# Patient Record
Sex: MALE | Race: BLACK OR AFRICAN AMERICAN | NOT HISPANIC OR LATINO | Employment: OTHER | URBAN - METROPOLITAN AREA
[De-identification: names, ages, dates, MRNs, and addresses within clinical notes are randomized per-mention and may not be internally consistent; named-entity substitution may affect disease eponyms.]

---

## 2017-01-23 ENCOUNTER — LAB VISIT (OUTPATIENT)
Dept: LAB | Facility: HOSPITAL | Age: 67
End: 2017-01-23
Attending: FAMILY MEDICINE
Payer: MEDICARE

## 2017-01-23 DIAGNOSIS — Z00.00 WELL ADULT EXAM: ICD-10-CM

## 2017-01-23 DIAGNOSIS — R73.03 BORDERLINE DIABETES: ICD-10-CM

## 2017-01-23 LAB
BASOPHILS # BLD AUTO: 0.02 K/UL
BASOPHILS NFR BLD: 0.3 %
COMPLEXED PSA SERPL-MCNC: 0.54 NG/ML
DIFFERENTIAL METHOD: ABNORMAL
EOSINOPHIL # BLD AUTO: 0.2 K/UL
EOSINOPHIL NFR BLD: 2.6 %
ERYTHROCYTE [DISTWIDTH] IN BLOOD BY AUTOMATED COUNT: 14.6 %
HCT VFR BLD AUTO: 40.1 %
HGB BLD-MCNC: 13.2 G/DL
LYMPHOCYTES # BLD AUTO: 1.7 K/UL
LYMPHOCYTES NFR BLD: 27.1 %
MCH RBC QN AUTO: 28.3 PG
MCHC RBC AUTO-ENTMCNC: 32.9 %
MCV RBC AUTO: 86 FL
MONOCYTES # BLD AUTO: 0.7 K/UL
MONOCYTES NFR BLD: 11.5 %
NEUTROPHILS # BLD AUTO: 3.5 K/UL
NEUTROPHILS NFR BLD: 58.2 %
PLATELET # BLD AUTO: 242 K/UL
PMV BLD AUTO: 10.4 FL
RBC # BLD AUTO: 4.67 M/UL
WBC # BLD AUTO: 6.08 K/UL

## 2017-01-23 PROCEDURE — 80053 COMPREHEN METABOLIC PANEL: CPT

## 2017-01-23 PROCEDURE — 83036 HEMOGLOBIN GLYCOSYLATED A1C: CPT

## 2017-01-23 PROCEDURE — 36415 COLL VENOUS BLD VENIPUNCTURE: CPT | Mod: PO

## 2017-01-23 PROCEDURE — 85025 COMPLETE CBC W/AUTO DIFF WBC: CPT

## 2017-01-23 PROCEDURE — 80061 LIPID PANEL: CPT

## 2017-01-23 PROCEDURE — 84153 ASSAY OF PSA TOTAL: CPT

## 2017-01-24 LAB
ALBUMIN SERPL BCP-MCNC: 3.8 G/DL
ALP SERPL-CCNC: 77 U/L
ALT SERPL W/O P-5'-P-CCNC: 35 U/L
ANION GAP SERPL CALC-SCNC: 9 MMOL/L
AST SERPL-CCNC: 31 U/L
BILIRUB SERPL-MCNC: 0.7 MG/DL
BUN SERPL-MCNC: 13 MG/DL
CALCIUM SERPL-MCNC: 9.3 MG/DL
CHLORIDE SERPL-SCNC: 101 MMOL/L
CHOLEST/HDLC SERPL: 2.2 {RATIO}
CO2 SERPL-SCNC: 26 MMOL/L
CREAT SERPL-MCNC: 1.4 MG/DL
EST. GFR  (AFRICAN AMERICAN): >60 ML/MIN/1.73 M^2
EST. GFR  (NON AFRICAN AMERICAN): 52.4 ML/MIN/1.73 M^2
ESTIMATED AVG GLUCOSE: 137 MG/DL
GLUCOSE SERPL-MCNC: 118 MG/DL
HBA1C MFR BLD HPLC: 6.4 %
HDL/CHOLESTEROL RATIO: 45.1 %
HDLC SERPL-MCNC: 113 MG/DL
HDLC SERPL-MCNC: 51 MG/DL
LDLC SERPL CALC-MCNC: 46.2 MG/DL
NONHDLC SERPL-MCNC: 62 MG/DL
POTASSIUM SERPL-SCNC: 4.7 MMOL/L
PROT SERPL-MCNC: 8.2 G/DL
SODIUM SERPL-SCNC: 136 MMOL/L
TRIGL SERPL-MCNC: 79 MG/DL

## 2017-01-27 ENCOUNTER — OFFICE VISIT (OUTPATIENT)
Dept: FAMILY MEDICINE | Facility: CLINIC | Age: 67
End: 2017-01-27
Payer: MEDICARE

## 2017-01-27 VITALS
DIASTOLIC BLOOD PRESSURE: 66 MMHG | TEMPERATURE: 98 F | HEART RATE: 86 BPM | WEIGHT: 315 LBS | RESPIRATION RATE: 18 BRPM | OXYGEN SATURATION: 97 % | HEIGHT: 70 IN | SYSTOLIC BLOOD PRESSURE: 110 MMHG | BODY MASS INDEX: 45.1 KG/M2

## 2017-01-27 DIAGNOSIS — G47.33 OBSTRUCTIVE SLEEP APNEA ON CPAP: ICD-10-CM

## 2017-01-27 DIAGNOSIS — E66.01 MORBID OBESITY WITH BMI OF 50.0-59.9, ADULT: ICD-10-CM

## 2017-01-27 DIAGNOSIS — R73.03 BORDERLINE DIABETES: ICD-10-CM

## 2017-01-27 DIAGNOSIS — Z00.00 WELL ADULT EXAM: Primary | ICD-10-CM

## 2017-01-27 DIAGNOSIS — I10 ESSENTIAL HYPERTENSION: ICD-10-CM

## 2017-01-27 PROCEDURE — 3078F DIAST BP <80 MM HG: CPT | Mod: S$GLB,,, | Performed by: FAMILY MEDICINE

## 2017-01-27 PROCEDURE — 99397 PER PM REEVAL EST PAT 65+ YR: CPT | Mod: S$GLB,,, | Performed by: FAMILY MEDICINE

## 2017-01-27 PROCEDURE — 99999 PR PBB SHADOW E&M-EST. PATIENT-LVL III: CPT | Mod: PBBFAC,,, | Performed by: FAMILY MEDICINE

## 2017-01-27 PROCEDURE — 3074F SYST BP LT 130 MM HG: CPT | Mod: S$GLB,,, | Performed by: FAMILY MEDICINE

## 2017-01-27 RX ORDER — DILTIAZEM HYDROCHLORIDE 300 MG/1
300 CAPSULE, COATED, EXTENDED RELEASE ORAL DAILY
Qty: 90 CAPSULE | Refills: 0 | Status: SHIPPED | OUTPATIENT
Start: 2017-01-27 | End: 2017-07-31 | Stop reason: SDUPTHER

## 2017-01-27 NOTE — PROGRESS NOTES
No chief complaint on file.      jB Pate Jr. is a 67 y.o. male who presents per the Chief Complaint.  Pt is known to me and was last seen by me on 12/9/2016.  All known chronic medical issues have been documented.       Hypertension   This is a chronic problem. The current episode started more than 1 year ago. The problem has been gradually worsening since onset. The problem is uncontrolled. Pertinent negatives include no anxiety, blurred vision, chest pain, headaches, malaise/fatigue, neck pain, orthopnea, palpitations, peripheral edema, PND, shortness of breath or sweats. Agents associated with hypertension include amphetamines. Risk factors for coronary artery disease include obesity, male gender, dyslipidemia and sedentary lifestyle. Past treatments include ACE inhibitors, calcium channel blockers and diuretics. The current treatment provides moderate improvement. Compliance problems include exercise.  There is no history of angina, kidney disease (improved), CAD/MI, CVA, heart failure, left ventricular hypertrophy, PVD, renovascular disease, retinopathy or a thyroid problem. Identifiable causes of hypertension include sleep apnea. There is no history of chronic renal disease (improved), coarctation of the aorta, hyperaldosteronism, hypercortisolism, hyperparathyroidism, a hypertension causing med or pheochromocytoma.   Diabetes   He presents for his follow-up diabetic visit. He has type 2 diabetes mellitus. His disease course has been stable. There are no hypoglycemic associated symptoms. Pertinent negatives for hypoglycemia include no confusion, dizziness (resolving), headaches, nervousness/anxiousness, pallor, seizures, sleepiness, speech difficulty (Improved), sweats or tremors. There are no diabetic associated symptoms. Pertinent negatives for diabetes include no blurred vision, no chest pain, no fatigue, no foot paresthesias and no weakness. There are no hypoglycemic complications. Symptoms are  stable. There are no diabetic complications. Pertinent negatives for diabetic complications include no CVA, PVD or retinopathy. Risk factors for coronary artery disease include diabetes mellitus, male sex, obesity, hypertension and dyslipidemia. When asked about current treatments, none were reported. His weight is stable. He is following a generally healthy diet. When asked about meal planning, he reported none. He has not had a previous visit with a dietitian. He rarely participates in exercise. There is no change in his home blood glucose trend. An ACE inhibitor/angiotensin II receptor blocker is being taken. He sees a podiatrist.Eye exam is current.        ROS  Review of Systems   Constitutional: Negative for activity change, appetite change, chills, fatigue, fever and malaise/fatigue.   HENT: Negative for congestion, ear pain, postnasal drip, rhinorrhea, sinus pressure, sore throat and trouble swallowing.    Eyes: Negative for blurred vision, pain and visual disturbance.   Respiratory: Negative for cough, chest tightness and shortness of breath.    Cardiovascular: Negative for chest pain, palpitations, orthopnea and PND.   Gastrointestinal: Negative for abdominal pain, constipation, diarrhea, nausea and vomiting.   Genitourinary: Negative for difficulty urinating, dysuria, flank pain, frequency, penile pain, penile swelling, scrotal swelling, testicular pain and urgency.   Musculoskeletal: Negative.  Negative for arthralgias, back pain, joint swelling, myalgias, neck pain and neck stiffness.   Skin: Negative.  Negative for pallor.   Allergic/Immunologic: Negative for environmental allergies, food allergies and immunocompromised state.   Neurological: Negative for dizziness (resolving), tremors, seizures, speech difficulty (Improved), weakness, light-headedness and headaches.   Psychiatric/Behavioral: Negative.  Negative for confusion. The patient is not nervous/anxious.        Physical Exam  Vitals:    01/27/17  "1055   BP: 110/66   Pulse: 86   Resp: 18   Temp: 98.1 °F (36.7 °C)    Body mass index is 49.26 kg/(m^2).  Weight: (!) 153.5 kg (338 lb 6.5 oz)   Height: 5' 9.5" (176.5 cm)     Physical Exam   Constitutional: He is oriented to person, place, and time. He appears well-developed and well-nourished. He is active and cooperative.  Non-toxic appearance. He does not have a sickly appearance. He does not appear ill. No distress.   HENT:   Head: Normocephalic and atraumatic.   Right Ear: Hearing, tympanic membrane, external ear and ear canal normal. No tenderness. No foreign bodies. No mastoid tenderness. Tympanic membrane is not injected, not scarred, not perforated, not erythematous, not retracted and not bulging. No hemotympanum. No decreased hearing is noted.   Left Ear: Hearing, tympanic membrane, external ear and ear canal normal. No tenderness. No foreign bodies. No mastoid tenderness. Tympanic membrane is not injected, not scarred, not perforated, not erythematous, not retracted and not bulging. No hemotympanum. No decreased hearing is noted.   Nose: Nose normal. No sinus tenderness, nasal deformity or septal deviation. No epistaxis.  No foreign bodies.   Mouth/Throat: Uvula is midline, oropharynx is clear and moist and mucous membranes are normal. He does not have dentures. Normal dentition. No uvula swelling or dental caries. No oropharyngeal exudate, posterior oropharyngeal edema, posterior oropharyngeal erythema or tonsillar abscesses.   Eyes: Conjunctivae, EOM and lids are normal. Pupils are equal, round, and reactive to light. Right eye exhibits no chemosis, no discharge, no exudate and no hordeolum. No foreign body present in the right eye. Left eye exhibits no chemosis, no discharge, no exudate and no hordeolum. No foreign body present in the left eye. No scleral icterus.   Neck: Normal range of motion and full passive range of motion without pain. Neck supple. No thyroid mass and no thyromegaly present. "   Cardiovascular: Normal rate, regular rhythm, S1 normal, S2 normal and normal heart sounds.  Exam reveals no gallop and no friction rub.    No murmur heard.  Pulmonary/Chest: Effort normal and breath sounds normal. He has no decreased breath sounds. He has no wheezes. He has no rhonchi. He has no rales.   Abdominal: Soft. Normal appearance and bowel sounds are normal. He exhibits no distension, no ascites and no mass. There is no hepatosplenomegaly. There is no tenderness. There is no rigidity, no rebound and no guarding. No hernia.   Musculoskeletal: Normal range of motion.   Lymphadenopathy:        Head (right side): No submental, no submandibular, no preauricular and no posterior auricular adenopathy present.        Head (left side): No submental, no submandibular, no preauricular and no posterior auricular adenopathy present.     He has no cervical adenopathy.   Neurological: He is alert and oriented to person, place, and time. He has normal strength. He displays no atrophy and no tremor. No cranial nerve deficit or sensory deficit. He exhibits normal muscle tone. He displays no seizure activity.   Skin: Skin is warm, dry and intact. No rash noted. He is not diaphoretic. No pallor.   Psychiatric: He has a normal mood and affect. His speech is normal and behavior is normal. Judgment and thought content normal. Cognition and memory are normal. He is attentive.   Vitals reviewed.      Assessment & Plan    1. Well adult exam  Discussed age appropriate screenings at this visit and encouraged a healthy diet with low saturated fats, and increased physical activity.  Screening test reviewed and discussed with patient.       2. Essential hypertension  Patient was counseled and encouraged to maintain a low sodium diet, as well as increasing physical activity.  Recommend random BP checks at home on a regular basis.  Will continue medication at this time, and follow up as recommended, or sooner if blood pressure is not  well controlled.  Discussed recent low BP and will consider medication decrease if persistent.  He is retired and has much less stress at this time and lower dose may be effective.  - diltiaZEM (CARTIA XT) 300 MG 24 hr capsule; Take 1 capsule (300 mg total) by mouth once daily.  Dispense: 90 capsule; Refill: 0    3. Borderline diabetes  Not a diabetic; discussed continued diet modifications and increasing physical activity.    4. Morbid obesity with BMI of 50.0-59.9, adult  We discussed weight and safe, effective ways of losing pounds, including low carbohydrate, low fat diet and increasing physical activity to improve cardiovascular performance and increase metabolism.  Patient was encouraged to set realistic attainable goals for weight loss, and we will follow up periodically.     5. Obstructive sleep apnea on CPAP: setting = 8  Stable; no therapeutic changes at this time.  Continue CPAP usage daily.      Follow up documented    ACTIVE MEDICAL ISSUES:  Documented in Problem List    PAST MEDICAL HISTORY  Documented    PAST SURGICAL HISTORY:  Documented    SOCIAL HISTORY:  Documented    FAMILY HISTORY:  Documented    ALLERGIES AND MEDICATIONS: updated and reviewed.  Documented    Health Maintenance       Date Due Completion Date    Zoster Vaccine 1/21/2012 ---    Colonoscopy 2/21/2015 2/21/2005 (Done)    Override on 2/21/2005: Done    Foot Exam 7/20/2016 7/20/2015 (Done)    Override on 7/20/2015: Done (today in OV)    Pneumococcal (65+) (2 of 2 - PPSV23) 2/19/2017 2/19/2016    Eye Exam 4/8/2017 4/8/2016 (Done)    Override on 4/8/2016: Done (future)    Override on 8/22/2014: Done    Hemoglobin A1c 7/23/2017 1/23/2017    Override on 4/8/2016: Done (future)    Override on 1/4/2016: Done (future)    Override on 8/22/2014: Done    Lipid Panel 1/23/2018 1/23/2017    Override on 8/22/2014: Done    TETANUS VACCINE 2/19/2026 2/19/2016

## 2017-03-07 ENCOUNTER — OFFICE VISIT (OUTPATIENT)
Dept: FAMILY MEDICINE | Facility: CLINIC | Age: 67
End: 2017-03-07
Payer: MEDICARE

## 2017-03-07 VITALS
TEMPERATURE: 99 F | HEIGHT: 69 IN | BODY MASS INDEX: 46.65 KG/M2 | WEIGHT: 315 LBS | SYSTOLIC BLOOD PRESSURE: 117 MMHG | OXYGEN SATURATION: 97 % | HEART RATE: 69 BPM | DIASTOLIC BLOOD PRESSURE: 70 MMHG | RESPIRATION RATE: 12 BRPM

## 2017-03-07 DIAGNOSIS — R06.09 DOE (DYSPNEA ON EXERTION): ICD-10-CM

## 2017-03-07 DIAGNOSIS — E66.01 MORBID OBESITY WITH BMI OF 45.0-49.9, ADULT: ICD-10-CM

## 2017-03-07 DIAGNOSIS — N18.2 TYPE 2 DIABETES MELLITUS WITH STAGE 2 CHRONIC KIDNEY DISEASE, WITHOUT LONG-TERM CURRENT USE OF INSULIN: ICD-10-CM

## 2017-03-07 DIAGNOSIS — R05.3 CHRONIC COUGH: ICD-10-CM

## 2017-03-07 DIAGNOSIS — E11.22 TYPE 2 DIABETES MELLITUS WITH STAGE 2 CHRONIC KIDNEY DISEASE, WITHOUT LONG-TERM CURRENT USE OF INSULIN: ICD-10-CM

## 2017-03-07 DIAGNOSIS — Z13.5 SCREENING FOR GLAUCOMA: ICD-10-CM

## 2017-03-07 DIAGNOSIS — Z23 NEED FOR VACCINATION AGAINST STREPTOCOCCUS PNEUMONIAE: ICD-10-CM

## 2017-03-07 DIAGNOSIS — Z23 NEED FOR PNEUMOCOCCAL VACCINATION: ICD-10-CM

## 2017-03-07 DIAGNOSIS — E11.9 DIET-CONTROLLED DIABETES MELLITUS: ICD-10-CM

## 2017-03-07 DIAGNOSIS — I10 ESSENTIAL HYPERTENSION: ICD-10-CM

## 2017-03-07 DIAGNOSIS — Z12.11 SCREENING FOR COLON CANCER: ICD-10-CM

## 2017-03-07 DIAGNOSIS — Z12.11 COLON CANCER SCREENING: ICD-10-CM

## 2017-03-07 DIAGNOSIS — Z00.00 ENCOUNTER FOR PREVENTIVE HEALTH EXAMINATION: Primary | ICD-10-CM

## 2017-03-07 PROCEDURE — 90732 PPSV23 VACC 2 YRS+ SUBQ/IM: CPT | Mod: S$GLB,,, | Performed by: PHYSICIAN ASSISTANT

## 2017-03-07 PROCEDURE — 3078F DIAST BP <80 MM HG: CPT | Mod: S$GLB,,, | Performed by: PHYSICIAN ASSISTANT

## 2017-03-07 PROCEDURE — 99499 UNLISTED E&M SERVICE: CPT | Mod: S$GLB,,, | Performed by: PHYSICIAN ASSISTANT

## 2017-03-07 PROCEDURE — 99999 PR PBB SHADOW E&M-EST. PATIENT-LVL V: CPT | Mod: PBBFAC,,, | Performed by: PHYSICIAN ASSISTANT

## 2017-03-07 PROCEDURE — 3074F SYST BP LT 130 MM HG: CPT | Mod: S$GLB,,, | Performed by: PHYSICIAN ASSISTANT

## 2017-03-07 PROCEDURE — G0439 PPPS, SUBSEQ VISIT: HCPCS | Mod: S$GLB,,, | Performed by: PHYSICIAN ASSISTANT

## 2017-03-07 PROCEDURE — G0009 ADMIN PNEUMOCOCCAL VACCINE: HCPCS | Mod: S$GLB,,, | Performed by: PHYSICIAN ASSISTANT

## 2017-03-07 RX ORDER — DICLOFENAC SODIUM 10 MG/G
2 GEL TOPICAL DAILY
COMMUNITY
End: 2017-05-29 | Stop reason: SDUPTHER

## 2017-03-07 NOTE — PATIENT INSTRUCTIONS
Counseling and Referral of Other Preventative  (Italic type indicates deductible and co-insurance are waived)    Patient Name: Bj Pate  Today's Date: 3/7/2017      SERVICE LIMITATIONS RECOMMENDATION    Vaccines    · Pneumococcal (once after 65)    · Influenza (annually)    · Hepatitis B (if medium/high risk)    · Prevnar 13      Hepatitis B medium/high risk factors:       - End-stage renal disease       - Hemophiliacs who received Factor VII or         IX concentrates       - Clients of institutions for the mentally             retarded       - Persons who live in the same house as          a HepB carrier       - Homosexual men       - Illicit injectable drug abusers     Pneumococcal: Scheduled - see appointments     Influenza: Done, repeat in one year     Hepatitis B: N/A NA     Prevnar 13: Done, repeat at next scheduled date    Prostate cancer screening (annually to age 75)     Prostate specific antigen (PSA) Shared decision making with Provider. Sometimes a co-pay may be required if the patient decides to have this test. The USPSTF no longer recommends prostate cancer screening routinely in medicine: every 1 year    Colorectal cancer screening (to age 75)    · Fecal occult blood test (annual)  · Flexible sigmoidoscopy (5y)  · Screening colonoscopy (10y)  · Barium enema   Recommended to patient, declined    Diabetes self-management training (no USPSTF recommendations)  Requires referral by treating physician for patient with diabetes or renal disease. 10 hours of initial DSMT sessions of no less than 30 minutes each in a continuous 12-month period. 2 hours of follow-up DSMT in subsequent years.  N/A NA    Glaucoma screening (no USPSTF recommendation)  Diabetes mellitus, family history   , age 50 or over    American, age 65 or over  Scheduled, see appointments    Medical nutrition therapy for diabetes or renal disease (no recommended schedule)  Requires referral by treating physician  for patient with diabetes or renal disease or kidney transplant within the past 3 years.  Can be provided in same year as diabetes self-management training (DSMT), and CMS recommends medical nutrition therapy take place after DSMT. Up to 3 hours for initial year and 2 hours in subsequent years.  N/A NA    Cardiovascular screening blood tests (every 5 years)  · Fasting lipid panel  Order as a panel if possible  Done this year, repeat every year    Diabetes screening tests (at least every 3 years, Medicare covers annually or at 6-month intervals for prediabetic patients)  · Fasting blood sugar (FBS) or glucose tolerance test (GTT)  Patient must be diagnosed with one of the following:       - Hypertension       - Dyslipidemia       - Obesity (BMI 30kg/m2)       - Previous elevated impaired FBS or GTT       ... or any two of the following:       - Overweight (BMI 25 but <30)       - Family history of diabetes       - Age 65 or older       - History of gestational diabetes or birth of baby weighing more than 9 pounds  Done this year, repeat every year    Abdominal aortic aneurysm screening (once)  · Sonogram   Limited to patients who meet one of the following criteria:       - Men who are 65-75 years old and have smoked more than 100 cigarette in their lifetime       - Anyone with a family history of abdominal aortic aneurysm       - Anyone recommended for screening by the USPSTF  N/A NA    HIV screening (annually for increased risk patients)  · HIV-1 and HIV-2 by EIA, or JOCELYN, rapid antibody test or oral mucosa transudate  Patients must be at increased risk for HIV infection per USPSTF guidelines or pregnant. Tests covered annually for patient at increased risk or as requested by the patient. Pregnant patients may receive up to 3 tests during pregnancy.  Risks discussed, screening is not recommended    Smoking cessation counseling (up to 8 sessions per year)  Patients must be asymptomatic of tobacco-related conditions  to receive as a preventative service.  not smoker    Subsequent annual wellness visit  At least 12 months since last AWV  Return in one year     The following information is provided to all patients.  This information is to help you find resources for any of the problems found today that may be affecting your health:                Living healthy guide: www.[facility].[place].[facility]      Understanding Diabetes: www.diabetes.org      Eating healthy: www.cdc.gov/healthyweight      CDC home safety checklist: www.cdc.gov/steadi/patient.html      Agency on Aging: www.goea.[email or web address]      Alcoholics anonymous (AA): www.aa.org      Physical Activity: www.tom.nih.gov/lv7uwap      Tobacco use: www.quitwithusla.org

## 2017-03-07 NOTE — MR AVS SNAPSHOT
[facility]  [address]  [place] [address]  Phone: [phone]  Fax: [phone]                  Bj Ptae Jr.   3/7/2017 1:00 PM   Office Visit    Description:  Male : 1950   Provider:  Jaida Juarez PA-C   Department:  [facility]           Reason for Visit     Health Risk Assessment           Diagnoses this Visit        Comments    Essential hypertension    -  Primary     Colon cancer screening         Need for pneumococcal vaccination         Encounter for preventive health examination         Screening for glaucoma         Need for vaccination against Streptococcus pneumoniae         Screening for colon cancer         Diet-controlled diabetes mellitus         LAUREANO (dyspnea on exertion)         Chronic cough                To Do List           Goals (5 Years of Data)              17    HEMOGLOBIN A1C < 7.0   6.4  6.4  6.5    Related Problems    Type 2 DM with CKD stage 3 and hypertension      [facility]     [facility] -  Assistance  Registered nurses in the [facility] Call Center provide clinical advisement, health education, appointment booking, and other advisory services.  Call for this free service at [phone].             Medications           Message regarding Medications     Verify the changes and/or additions to your medication regime listed below are the same as discussed with your clinician today.  If any of these changes or additions are incorrect, please notify your healthcare provider.             Verify that the below list of medications is an accurate representation of the medications you are currently taking.  If none reported, the list may be blank. If incorrect, please contact your healthcare provider. Carry this list with you in case of emergency.           Current Medications     albuterol 90 mcg/actuation inhaler Inhale 2 puffs into the lungs every 6 (six) hours as needed  "for Wheezing.    aspirin 325 MG tablet Take 1 tablet (325 mg total) by mouth once daily.    atorvastatin (LIPITOR) 40 MG tablet Take 1 tablet (40 mg total) by mouth once daily.    COLCRYS 0.6 mg tablet TAKE 2 TABLET BY MOUTH AT THE FIRST SIGN OF GOUT FLARE THEN ONE TABLET ONE HOUR LATER    diclofenac sodium 1 % Gel Apply 2 g topically once daily.    diltiaZEM (CARTIA XT) 300 MG 24 hr capsule Take 1 capsule (300 mg total) by mouth once daily.    levocetirizine (XYZAL) 5 MG tablet Take 1 tablet (5 mg total) by mouth every evening.    lisinopril-hydrochlorothiazide (PRINZIDE,ZESTORETIC) 20-25 mg Tab Take 1 tablet by mouth once daily.    clobetasol 0.05% (TEMOVATE) 0.05 % Oint Apply topically 2 (two) times daily.           Clinical Reference Information           Your Vitals Were     BP Pulse Temp Resp Height Weight    117/70 (BP Location: Left arm, Patient Position: Sitting, BP Method: Manual) 69 98.8 °F (37.1 °C) (Oral) 12 5' 9" (1.753 m) 151.7 kg (334 lb 7 oz)    SpO2 BMI             97% 49.39 kg/m2         Blood Pressure          Most Recent Value    BP  117/70      Allergies as of 3/7/2017     Tomato (Solanum Lycopersicum)    Naproxen    Shrimp      Immunizations Administered on Date of Encounter - 3/7/2017     Name Date Dose VIS Date Route    Pneumococcal Polysaccharide - 23 Valent  Incomplete 0.5 mL 4/24/2015 Intramuscular      Orders Placed During Today's Visit      Normal Orders This Visit    Ambulatory referral to Optometry     Pneumococcal polysaccharide vaccine 23-valent greater than or equal to 3yo subcutaneous/IM     POCT occult blood stool     Future Labs/Procedures Expected by Expires    Complete PFT w/o bronchodilator  As directed 3/7/2018      Instructions      Counseling and Referral of Other Preventative  (Italic type indicates deductible and co-insurance are waived)    Patient Name: Bj Pate  Today's Date: 3/7/2017      SERVICE LIMITATIONS RECOMMENDATION    Vaccines    · Pneumococcal (once " after 65)    · Influenza (annually)    · Hepatitis B (if medium/high risk)    · Prevnar 13      Hepatitis B medium/high risk factors:       - End-stage renal disease       - Hemophiliacs who received Factor VII or         IX concentrates       - Clients of institutions for the mentally             retarded       - Persons who live in the same house as          a HepB carrier       - Homosexual men       - Illicit injectable drug abusers     Pneumococcal: Scheduled - see appointments     Influenza: Done, repeat in one year     Hepatitis B: N/A NA     Prevnar 13: Done, repeat at next scheduled date    Prostate cancer screening (annually to age 75)     Prostate specific antigen (PSA) Shared decision making with Provider. Sometimes a co-pay may be required if the patient decides to have this test. The USPSTF no longer recommends prostate cancer screening routinely in medicine: every 1 year    Colorectal cancer screening (to age 75)    · Fecal occult blood test (annual)  · Flexible sigmoidoscopy (5y)  · Screening colonoscopy (10y)  · Barium enema   Recommended to patient, declined    Diabetes self-management training (no USPSTF recommendations)  Requires referral by treating physician for patient with diabetes or renal disease. 10 hours of initial DSMT sessions of no less than 30 minutes each in a continuous 12-month period. 2 hours of follow-up DSMT in subsequent years.  N/A NA    Glaucoma screening (no USPSTF recommendation)  Diabetes mellitus, family history   , age 50 or over    American, age 65 or over  Scheduled, see appointments    Medical nutrition therapy for diabetes or renal disease (no recommended schedule)  Requires referral by treating physician for patient with diabetes or renal disease or kidney transplant within the past 3 years.  Can be provided in same year as diabetes self-management training (DSMT), and CMS recommends medical nutrition therapy take place after DSMT. Up to 3  hours for initial year and 2 hours in subsequent years.  N/A NA    Cardiovascular screening blood tests (every 5 years)  · Fasting lipid panel  Order as a panel if possible  Done this year, repeat every year    Diabetes screening tests (at least every 3 years, Medicare covers annually or at 6-month intervals for prediabetic patients)  · Fasting blood sugar (FBS) or glucose tolerance test (GTT)  Patient must be diagnosed with one of the following:       - Hypertension       - Dyslipidemia       - Obesity (BMI 30kg/m2)       - Previous elevated impaired FBS or GTT       ... or any two of the following:       - Overweight (BMI 25 but <30)       - Family history of diabetes       - Age 65 or older       - History of gestational diabetes or birth of baby weighing more than 9 pounds  Done this year, repeat every year    Abdominal aortic aneurysm screening (once)  · Sonogram   Limited to patients who meet one of the following criteria:       - Men who are 65-75 years old and have smoked more than 100 cigarette in their lifetime       - Anyone with a family history of abdominal aortic aneurysm       - Anyone recommended for screening by the USPSTF  N/A NA    HIV screening (annually for increased risk patients)  · HIV-1 and HIV-2 by EIA, or JOCELYN, rapid antibody test or oral mucosa transudate  Patients must be at increased risk for HIV infection per USPSTF guidelines or pregnant. Tests covered annually for patient at increased risk or as requested by the patient. Pregnant patients may receive up to 3 tests during pregnancy.  Risks discussed, screening is not recommended    Smoking cessation counseling (up to 8 sessions per year)  Patients must be asymptomatic of tobacco-related conditions to receive as a preventative service.  not smoker    Subsequent annual wellness visit  At least 12 months since last AWV  Return in one year     The following information is provided to all patients.  This information is to help you find  resources for any of the problems found today that may be affecting your health:                Living healthy guide: www.[facility].[place].[facility]      Understanding Diabetes: www.diabetes.org      Eating healthy: www.cdc.gov/healthyweight      CDC home safety checklist: www.cdc.gov/steadi/patient.html      Agency on Aging: www.goea.[place].[facility]      Alcoholics anonymous (AA): www.aa.org      Physical Activity: www.tom.nih.gov/co9hzey      Tobacco use: www.quitwithusla.org          Language Assistance Services     ATTENTION: Language assistance services are available, free of charge. Please call [phone].      ATENCIÓN: Si habla español, tiene a fletcher disposición servicios gratuitos de asistencia lingüística. Llame al [phone].     CHÚ Ý: N?u b?n nói Ti?ng Vi?t, có các d?ch v? h? tr? ngôn ng? mi?n phí dành cho b?n. G?i s? [phone].         [facility] complies with applicable Federal civil rights laws and does not discriminate on the basis of race, color, national origin, age, disability, or sex.

## 2017-03-08 NOTE — PROGRESS NOTES
"Bj Pate presented for a  Medicare AWV and comprehensive Health Risk Assessment today. The following components were reviewed and updated:    · Medical history  · Family History  · Social history  · Allergies and Current Medications  · Health Risk Assessment  · Health Maintenance  · Care Team     ** See Completed Assessments for Annual Wellness Visit within the encounter summary.**       The following assessments were completed:  · Living Situation  · CAGE  · Depression Screening  · Timed Get Up and Go  · Whisper Test  · Cognitive Function Screening  · Nutrition Screening  · ADL Screening  · PAQ Screening    Vitals:    03/07/17 1258   BP: 117/70   BP Location: Left arm   Patient Position: Sitting   BP Method: Manual   Pulse: 69   Resp: 12   Temp: 98.8 °F (37.1 °C)   TempSrc: Oral   SpO2: 97%   Weight: (!) 151.7 kg (334 lb 7 oz)   Height: 5' 9" (1.753 m)     Body mass index is 49.39 kg/(m^2).  Physical Exam   Constitutional: He is oriented to person, place, and time. No distress.   obese   Cardiovascular: Normal rate and regular rhythm.    Pulses:       Dorsalis pedis pulses are 2+ on the right side, and 2+ on the left side.   Pulmonary/Chest: Effort normal and breath sounds normal. No respiratory distress. He has no wheezes.   Feet:   Right Foot:   Protective Sensation: 10 sites tested. 8 sites sensed.   Skin Integrity: Positive for dry skin.   Left Foot:   Protective Sensation: 10 sites tested. 8 sites sensed.   Skin Integrity: Positive for dry skin.   Neurological: He is alert and oriented to person, place, and time.   Skin: He is not diaphoretic.   Vitals reviewed.        Diagnoses and health risks identified today and associated recommendations/orders:    1. Encounter for preventive health examination  Provided Bj with a 5-10 year written screening schedule and personal prevention plan. Recommendations were developed using the USPSTF age appropriate recommendations. Education, counseling, and referrals " were provided as needed. After Visit Summary printed and given to patient which includes a list of additional screenings\tests needed.    2. Morbid obesity with BMI of 45.0-49.9, adult  Discussed diet and exercise. 4 lb decrease in 1 month    3. Diet-controlled diabetes mellitus  Continue low carb low sugar diet    4. Type 2 diabetes mellitus with stage 2 chronic kidney disease, without long-term current use of insulin  Stable, continue diabetic diet    5. Colon cancer screening  FOBT stool cards    7. Screening for glaucoma  - Ambulatory referral to Optometry    8. Need for vaccination against Streptococcus pneumoniae  - Pneumococcal polysaccharide vaccine 23-valent greater than or equal to 3yo subcutaneous/IM    9. Screening for colon cancer  - POCT occult blood stool    10. Essential hypertension  Stable on current meds    11. LAUREANO (dyspnea on exertion)  - Complete PFT w/o bronchodilator; Future    12. Chronic cough  - Complete PFT w/o bronchodilator; Future      No Follow-up on file.    Jaida Juarez PA-C

## 2017-03-09 ENCOUNTER — HOSPITAL ENCOUNTER (OUTPATIENT)
Dept: RESPIRATORY THERAPY | Facility: HOSPITAL | Age: 67
Discharge: HOME | End: 2017-03-09
Attending: FAMILY MEDICINE
Payer: MEDICARE

## 2017-03-09 DIAGNOSIS — R06.09 DOE (DYSPNEA ON EXERTION): ICD-10-CM

## 2017-03-09 DIAGNOSIS — R05.3 CHRONIC COUGH: ICD-10-CM

## 2017-03-09 PROCEDURE — 94010 BREATHING CAPACITY TEST: CPT

## 2017-03-09 PROCEDURE — 94727 GAS DIL/WSHOT DETER LNG VOL: CPT

## 2017-03-09 PROCEDURE — 94729 DIFFUSING CAPACITY: CPT

## 2017-03-10 ENCOUNTER — TELEPHONE (OUTPATIENT)
Dept: FAMILY MEDICINE | Facility: CLINIC | Age: 67
End: 2017-03-10

## 2017-03-14 ENCOUNTER — OFFICE VISIT (OUTPATIENT)
Dept: OPTOMETRY | Facility: CLINIC | Age: 67
End: 2017-03-14
Payer: MEDICARE

## 2017-03-14 DIAGNOSIS — H25.13 NUCLEAR SCLEROSIS, BILATERAL: ICD-10-CM

## 2017-03-14 DIAGNOSIS — E11.9 TYPE 2 DIABETES MELLITUS WITHOUT OPHTHALMIC MANIFESTATIONS: Primary | ICD-10-CM

## 2017-03-14 DIAGNOSIS — H52.7 REFRACTIVE ERROR: ICD-10-CM

## 2017-03-14 DIAGNOSIS — Z13.5 SCREENING FOR GLAUCOMA: ICD-10-CM

## 2017-03-14 DIAGNOSIS — H35.413 LATTICE DEGENERATION, BILATERAL: ICD-10-CM

## 2017-03-14 PROCEDURE — 99999 PR PBB SHADOW E&M-EST. PATIENT-LVL II: CPT | Mod: PBBFAC,,, | Performed by: OPTOMETRIST

## 2017-03-14 PROCEDURE — 92014 COMPRE OPH EXAM EST PT 1/>: CPT | Mod: S$GLB,,, | Performed by: OPTOMETRIST

## 2017-03-14 PROCEDURE — 99499 UNLISTED E&M SERVICE: CPT | Mod: S$GLB,,, | Performed by: OPTOMETRIST

## 2017-03-14 NOTE — LETTER
March 14, 2017      Daija Sheppard MD  [address]  [place] [address]           [facility]  [address] [facility] [address]  [place] [address]  Phone: [phone]  Fax: [phone]          Patient: Bj Pate Jr.   MR Number: [id number]   YOB: 1950   Date of Visit: 3/14/2017       Dear Dr. Daija Sheppard:    Thank you for referring Bj Pate to me for evaluation. Attached you will find relevant portions of my assessment and plan of care.    If you have questions, please do not hesitate to call me. I look forward to following Bj Pate along with you.    Sincerely,    Geno Hook, OD    Enclosure  CC:  No Recipients    If you would like to receive this communication electronically, please contact [email or web address] or [phone] to request more information on [facility] Link access.    For providers and/or their staff who would like to refer a patient to [facility], please contact us through our one-stop-shop provider referral line, [facility] , at [phone].    If you feel you have received this communication in error or would no longer like to receive these types of communications, please e-mail [email or web address]

## 2017-03-14 NOTE — PROGRESS NOTES
HPI     Diabetic Eye Exam    Additional comments: diet controlled            Comments   Dls: 6/7/13 Dr. Riojas     Pt here for diabetic eye exam.   Pt states no changes in vision. Pt wears bifocal glasses. Pt states no   tearing no itching no burning no pain no ha's no floaters.     Eye meds:   otc gtts ou prn     Hemoglobin A1C       Date                     Value               Ref Range             Status                01/23/2017               6.4 (H)             4.5 - 6.2 %           Final                  01/30/2016               6.4 (H)             4.5 - 6.2 %           Final                 01/04/2016               6.5 (H)             4.5 - 6.2 %           Final            ----------  LBS - does not check, prior diabetic, now diet controlled, no meds    Family OHx  (--) glaucoma  (--) AMD         Last edited by Geno Hook, OD on 3/14/2017 10:47 AM. (History)          Assessment /Plan     For exam results, see Encounter Report.    Type 2 diabetes mellitus without ophthalmic manifestations  - No diabetic retinopathy. Educated patient on importance of good blood sugar control, compliance with meds, and follow up care with PCP. Return in 1 year for dilated eye exam, sooner PRN.    Nuclear sclerosis, bilateral  - Educated pt on presence of cataracts and effects on vision. No surgery at this time. Recheck in one year.    Lattice degeneration, bilateral  - Stable. No holes/tears/detachments. Monitor.     Screening for glaucoma  - No changes related to glaucoma. Monitor yearly.    Refractive error  - Patient declined refraction today, continue with current spectacles.     RTC 1 year(s) or sooner PRN

## 2017-03-14 NOTE — MR AVS SNAPSHOT
[facility]  [address]  [place] [address]  Phone: [phone]  Fax: [phone]                  Bj Pate Jr.   3/14/2017 10:30 AM   Office Visit    Description:  Male : 1950   Provider:  Geno Hook OD   Department:  [facility]           Reason for Visit     Concerns About Ocular Health     Diabetic Eye Exam           Diagnoses this Visit        Comments    Type 2 diabetes mellitus without ophthalmic manifestations    -  Primary     Nuclear sclerosis, bilateral         Lattice degeneration, bilateral         Screening for glaucoma         Refractive error                To Do List           Goals (5 Years of Data)              17    HEMOGLOBIN A1C < 7.0   6.4  6.4  6.5    Related Problems    Type 2 DM with CKD stage 3 and hypertension      Follow-Up and Disposition     Return in about 1 year (around 3/14/2018) for Diabetic Eye Exam.      [facility]     [facility] On Call Nurse Care Line - 24/ Assistance  Registered nurses in the [facility] On Call Center provide clinical advisement, health education, appointment booking, and other advisory services.  Call for this free service at [phone].             Medications           Message regarding Medications     Verify the changes and/or additions to your medication regime listed below are the same as discussed with your clinician today.  If any of these changes or additions are incorrect, please notify your healthcare provider.             Verify that the below list of medications is an accurate representation of the medications you are currently taking.  If none reported, the list may be blank. If incorrect, please contact your healthcare provider. Carry this list with you in case of emergency.           Current Medications     atorvastatin (LIPITOR) 40 MG tablet Take 1 tablet (40 mg total) by mouth once daily.    COLCRYS 0.6 mg tablet TAKE 2 TABLET BY MOUTH AT THE FIRST SIGN OF GOUT FLARE  THEN ONE TABLET ONE HOUR LATER    diclofenac sodium 1 % Gel Apply 2 g topically once daily.    diltiaZEM (CARTIA XT) 300 MG 24 hr capsule Take 1 capsule (300 mg total) by mouth once daily.    levocetirizine (XYZAL) 5 MG tablet Take 1 tablet (5 mg total) by mouth every evening.    lisinopril-hydrochlorothiazide (PRINZIDE,ZESTORETIC) 20-25 mg Tab Take 1 tablet by mouth once daily.    albuterol 90 mcg/actuation inhaler Inhale 2 puffs into the lungs every 6 (six) hours as needed for Wheezing.    aspirin 325 MG tablet Take 1 tablet (325 mg total) by mouth once daily.    clobetasol 0.05% (TEMOVATE) 0.05 % Oint Apply topically 2 (two) times daily.           Clinical Reference Information           Allergies as of 3/14/2017     Tomato (Solanum Lycopersicum)    Naproxen    Shrimp      Immunizations Administered on Date of Encounter - 3/14/2017     None      Language Assistance Services     ATTENTION: Language assistance services are available, free of charge. Please call [phone].      ATENCIÓN: Si darwinla jeremy, tiene a fletcher disposición servicios gratuitos de asistencia lingüística. Llame al [phone].     CHÚ Ý: N?u b?n nói Ti?ng Vi?t, có các d?ch v? h? tr? ngôn ng? mi?n phí dành cho b?n. G?i s? [phone].         [facility] complies with applicable Federal civil rights laws and does not discriminate on the basis of race, color, national origin, age, disability, or sex.

## 2017-03-16 ENCOUNTER — LAB VISIT (OUTPATIENT)
Dept: LAB | Facility: HOSPITAL | Age: 67
End: 2017-03-16
Attending: FAMILY MEDICINE
Payer: MEDICARE

## 2017-03-16 DIAGNOSIS — R53.83 FATIGUE, UNSPECIFIED TYPE: ICD-10-CM

## 2017-03-16 DIAGNOSIS — Z12.11 COLON CANCER SCREENING: Primary | ICD-10-CM

## 2017-03-16 PROCEDURE — 82272 OCCULT BLD FECES 1-3 TESTS: CPT

## 2017-03-17 LAB
OB PNL STL: NEGATIVE

## 2017-03-20 ENCOUNTER — TELEPHONE (OUTPATIENT)
Dept: FAMILY MEDICINE | Facility: CLINIC | Age: 67
End: 2017-03-20

## 2017-03-20 NOTE — TELEPHONE ENCOUNTER
----- Message from Emely Diaz sent at 3/20/2017 11:03 AM CDT -----  Contact: self  Pt called to discuss lung test results. Please advise. [phone]

## 2017-03-29 ENCOUNTER — TELEPHONE (OUTPATIENT)
Dept: FAMILY MEDICINE | Facility: CLINIC | Age: 67
End: 2017-03-29

## 2017-03-29 DIAGNOSIS — J44.9 COPD, MODERATE: ICD-10-CM

## 2017-03-29 RX ORDER — FLUTICASONE PROPIONATE AND SALMETEROL 250; 50 UG/1; UG/1
1 POWDER RESPIRATORY (INHALATION) 2 TIMES DAILY
Qty: 60 EACH | Refills: 2 | Status: SHIPPED | OUTPATIENT
Start: 2017-03-29 | End: 2017-08-30 | Stop reason: SDUPTHER

## 2017-03-29 NOTE — TELEPHONE ENCOUNTER
----- Message from Clementina Vance sent at 3/29/2017  1:03 PM CDT -----  Contact: Self  Pt requesting lung test results.  Please call [phone]

## 2017-03-29 NOTE — TELEPHONE ENCOUNTER
Patient informed of PFT results.  Patient would like to know what is next step in treatment.   Patient informed, per Dr. Lombard, that oral medication to help with symptoms will be sent to pharmacy.  Patient verbalized understanding.

## 2017-03-30 ENCOUNTER — TELEPHONE (OUTPATIENT)
Dept: FAMILY MEDICINE | Facility: CLINIC | Age: 67
End: 2017-03-30

## 2017-03-30 NOTE — TELEPHONE ENCOUNTER
----- Message from Светлана Galaviz sent at 3/30/2017 11:58 AM CDT -----  Contact: [phone]  Pt is returning the phone call Please call pt at your earliest convenience.  Thanks !

## 2017-05-29 RX ORDER — DICLOFENAC SODIUM 10 MG/G
2 GEL TOPICAL DAILY
Qty: 100 G | Refills: 2 | Status: SHIPPED | OUTPATIENT
Start: 2017-05-29 | End: 2017-11-22 | Stop reason: SDUPTHER

## 2017-06-07 DIAGNOSIS — E78.5 DYSLIPIDEMIA: ICD-10-CM

## 2017-06-07 DIAGNOSIS — N18.30 BENIGN HYPERTENSION WITH CKD (CHRONIC KIDNEY DISEASE) STAGE III: ICD-10-CM

## 2017-06-07 DIAGNOSIS — I12.9 BENIGN HYPERTENSION WITH CKD (CHRONIC KIDNEY DISEASE) STAGE III: ICD-10-CM

## 2017-06-08 RX ORDER — LISINOPRIL AND HYDROCHLOROTHIAZIDE 20; 25 MG/1; MG/1
TABLET ORAL
Qty: 30 TABLET | Refills: 6 | Status: SHIPPED | OUTPATIENT
Start: 2017-06-08 | End: 2018-01-30 | Stop reason: SDUPTHER

## 2017-06-08 RX ORDER — ATORVASTATIN CALCIUM 40 MG/1
TABLET, FILM COATED ORAL
Qty: 30 TABLET | Refills: 6 | Status: SHIPPED | OUTPATIENT
Start: 2017-06-08 | End: 2018-02-07 | Stop reason: SDUPTHER

## 2017-07-16 RX ORDER — INDOMETHACIN 50 MG/1
CAPSULE ORAL
Qty: 30 CAPSULE | Refills: 0 | OUTPATIENT
Start: 2017-07-16

## 2017-07-17 RX ORDER — COLCHICINE 0.6 MG/1
TABLET, FILM COATED ORAL
Qty: 30 TABLET | Refills: 0 | Status: SHIPPED | OUTPATIENT
Start: 2017-07-17 | End: 2017-08-24 | Stop reason: SDUPTHER

## 2017-07-20 RX ORDER — INDOMETHACIN 50 MG/1
CAPSULE ORAL
Qty: 30 CAPSULE | Refills: 0 | Status: SHIPPED | OUTPATIENT
Start: 2017-07-20 | End: 2017-12-26 | Stop reason: SDUPTHER

## 2017-07-28 DIAGNOSIS — J32.0 CHRONIC MAXILLARY SINUSITIS: ICD-10-CM

## 2017-07-28 RX ORDER — LEVOCETIRIZINE DIHYDROCHLORIDE 5 MG/1
TABLET, FILM COATED ORAL
Qty: 30 TABLET | Refills: 0 | Status: SHIPPED | OUTPATIENT
Start: 2017-07-28 | End: 2017-09-14 | Stop reason: SDUPTHER

## 2017-07-31 ENCOUNTER — OFFICE VISIT (OUTPATIENT)
Dept: FAMILY MEDICINE | Facility: CLINIC | Age: 67
End: 2017-07-31
Payer: MEDICARE

## 2017-07-31 ENCOUNTER — LAB VISIT (OUTPATIENT)
Dept: LAB | Facility: HOSPITAL | Age: 67
End: 2017-07-31
Attending: FAMILY MEDICINE
Payer: MEDICARE

## 2017-07-31 VITALS
RESPIRATION RATE: 17 BRPM | BODY MASS INDEX: 46.65 KG/M2 | TEMPERATURE: 99 F | HEART RATE: 80 BPM | HEIGHT: 69 IN | DIASTOLIC BLOOD PRESSURE: 68 MMHG | OXYGEN SATURATION: 96 % | SYSTOLIC BLOOD PRESSURE: 118 MMHG | WEIGHT: 315 LBS

## 2017-07-31 DIAGNOSIS — Z86.79 HISTORY OF CEREBRAL PARENCHYMAL HEMORRHAGE: ICD-10-CM

## 2017-07-31 DIAGNOSIS — E11.9 DIET-CONTROLLED DIABETES MELLITUS: ICD-10-CM

## 2017-07-31 DIAGNOSIS — E11.9 DIET-CONTROLLED DIABETES MELLITUS: Primary | ICD-10-CM

## 2017-07-31 DIAGNOSIS — I10 ESSENTIAL HYPERTENSION: ICD-10-CM

## 2017-07-31 LAB
ANION GAP SERPL CALC-SCNC: 9 MMOL/L
BUN SERPL-MCNC: 20 MG/DL
CALCIUM SERPL-MCNC: 9.1 MG/DL
CHLORIDE SERPL-SCNC: 102 MMOL/L
CHOLEST/HDLC SERPL: 2.5 {RATIO}
CO2 SERPL-SCNC: 26 MMOL/L
CREAT SERPL-MCNC: 1.7 MG/DL
EST. GFR  (AFRICAN AMERICAN): 47.9 ML/MIN/1.73 M^2
EST. GFR  (NON AFRICAN AMERICAN): 41.4 ML/MIN/1.73 M^2
GLUCOSE SERPL-MCNC: 99 MG/DL
HDL/CHOLESTEROL RATIO: 39.5 %
HDLC SERPL-MCNC: 119 MG/DL
HDLC SERPL-MCNC: 47 MG/DL
LDLC SERPL CALC-MCNC: 53.4 MG/DL
NONHDLC SERPL-MCNC: 72 MG/DL
POTASSIUM SERPL-SCNC: 4.2 MMOL/L
SODIUM SERPL-SCNC: 137 MMOL/L
TRIGL SERPL-MCNC: 93 MG/DL

## 2017-07-31 PROCEDURE — 80061 LIPID PANEL: CPT

## 2017-07-31 PROCEDURE — 36415 COLL VENOUS BLD VENIPUNCTURE: CPT | Mod: PO

## 2017-07-31 PROCEDURE — 99499 UNLISTED E&M SERVICE: CPT | Mod: S$GLB,,, | Performed by: FAMILY MEDICINE

## 2017-07-31 PROCEDURE — 80048 BASIC METABOLIC PNL TOTAL CA: CPT

## 2017-07-31 PROCEDURE — 3044F HG A1C LEVEL LT 7.0%: CPT | Mod: S$GLB,,, | Performed by: FAMILY MEDICINE

## 2017-07-31 PROCEDURE — 99214 OFFICE O/P EST MOD 30 MIN: CPT | Mod: S$GLB,,, | Performed by: FAMILY MEDICINE

## 2017-07-31 PROCEDURE — 4010F ACE/ARB THERAPY RXD/TAKEN: CPT | Mod: S$GLB,,, | Performed by: FAMILY MEDICINE

## 2017-07-31 PROCEDURE — 83036 HEMOGLOBIN GLYCOSYLATED A1C: CPT

## 2017-07-31 PROCEDURE — 99999 PR PBB SHADOW E&M-EST. PATIENT-LVL III: CPT | Mod: PBBFAC,,, | Performed by: FAMILY MEDICINE

## 2017-07-31 RX ORDER — DILTIAZEM HYDROCHLORIDE 240 MG/1
240 CAPSULE, COATED, EXTENDED RELEASE ORAL DAILY
Qty: 30 CAPSULE | Refills: 2 | Status: SHIPPED | OUTPATIENT
Start: 2017-07-31 | End: 2017-11-07 | Stop reason: SDUPTHER

## 2017-07-31 NOTE — PROGRESS NOTES
Chief Complaint   Patient presents with    Hypertension       Bj Pate Jr. is a 67 y.o. male who presents per the Chief Complaint.  Pt is known to me and was last seen by me on 1/27/2017.  All known chronic medical issues have been documented.       Hypertension   This is a chronic problem. The current episode started more than 1 year ago. The problem has been gradually worsening since onset. The problem is uncontrolled. Pertinent negatives include no anxiety, blurred vision, chest pain, headaches, malaise/fatigue, neck pain, orthopnea, palpitations, peripheral edema, PND, shortness of breath or sweats. Agents associated with hypertension include amphetamines. Risk factors for coronary artery disease include obesity, male gender, dyslipidemia and sedentary lifestyle. Past treatments include ACE inhibitors, calcium channel blockers and diuretics. The current treatment provides moderate improvement. Compliance problems include exercise.  There is no history of angina, kidney disease (improved), CAD/MI, CVA, heart failure, left ventricular hypertrophy, PVD, renovascular disease, retinopathy or a thyroid problem. Identifiable causes of hypertension include sleep apnea. There is no history of chronic renal disease (improved), coarctation of the aorta, hyperaldosteronism, hypercortisolism, hyperparathyroidism, a hypertension causing med or pheochromocytoma.   Diabetes   He presents for his follow-up diabetic visit. He has type 2 diabetes mellitus. His disease course has been stable. There are no hypoglycemic associated symptoms. Pertinent negatives for hypoglycemia include no confusion, dizziness (resolving), headaches, nervousness/anxiousness, pallor, seizures, sleepiness, speech difficulty, sweats or tremors. There are no diabetic associated symptoms. Pertinent negatives for diabetes include no blurred vision, no chest pain, no fatigue, no foot paresthesias and no weakness. There are no hypoglycemic complications.  Symptoms are stable. There are no diabetic complications. Pertinent negatives for diabetic complications include no CVA, PVD or retinopathy. Risk factors for coronary artery disease include diabetes mellitus, male sex, obesity, hypertension and dyslipidemia. When asked about current treatments, none were reported. His weight is stable. He is following a generally healthy diet. When asked about meal planning, he reported none. He has not had a previous visit with a dietitian. He rarely participates in exercise. There is no change in his home blood glucose trend. An ACE inhibitor/angiotensin II receptor blocker is being taken. He sees a podiatrist.Eye exam is current.        ROS  Review of Systems   Constitutional: Negative.  Negative for activity change, appetite change, chills, diaphoresis, fatigue, fever, malaise/fatigue and unexpected weight change.   HENT: Negative.  Negative for congestion, ear pain, hearing loss, nosebleeds, postnasal drip, rhinorrhea, sinus pressure, sneezing, sore throat and trouble swallowing.    Eyes: Negative for blurred vision, pain and visual disturbance.   Respiratory: Negative for cough, choking and shortness of breath.    Cardiovascular: Negative for chest pain, palpitations, orthopnea, leg swelling and PND.   Gastrointestinal: Negative for abdominal pain, constipation, diarrhea, nausea and vomiting.   Genitourinary: Negative for difficulty urinating, dysuria, frequency and urgency.   Musculoskeletal: Negative.  Negative for arthralgias, back pain, gait problem, joint swelling, myalgias and neck pain.   Skin: Negative.  Negative for pallor.   Allergic/Immunologic: Negative for environmental allergies and food allergies.   Neurological: Positive for numbness (tingling legs and arms). Negative for dizziness (resolving), tremors, seizures, syncope, speech difficulty, weakness, light-headedness and headaches.   Psychiatric/Behavioral: Negative.  Negative for confusion, decreased  "concentration, dysphoric mood and sleep disturbance. The patient is not nervous/anxious.        Physical Exam  Vitals:    07/31/17 1054   BP: 118/68   Pulse: 80   Resp: 17   Temp: 98.5 °F (36.9 °C)    Body mass index is 48.09 kg/m².  Weight: (!) 147.7 kg (325 lb 9.9 oz)   Height: 5' 9" (175.3 cm)     Physical Exam   Constitutional: He is oriented to person, place, and time. He appears well-developed and well-nourished. He is active and cooperative.  Non-toxic appearance. He does not have a sickly appearance. He does not appear ill. No distress.   HENT:   Head: Normocephalic and atraumatic.   Right Ear: Hearing and external ear normal. No decreased hearing is noted.   Left Ear: Hearing and external ear normal. No decreased hearing is noted.   Nose: Nose normal. No rhinorrhea or nasal deformity.   Mouth/Throat: Uvula is midline and oropharynx is clear and moist. He does not have dentures. Normal dentition.   Eyes: Conjunctivae, EOM and lids are normal. Pupils are equal, round, and reactive to light. Right eye exhibits no chemosis, no discharge and no exudate. No foreign body present in the right eye. Left eye exhibits no chemosis, no discharge and no exudate. No foreign body present in the left eye. No scleral icterus.   Neck: Normal range of motion and full passive range of motion without pain. Neck supple.   Cardiovascular: Normal rate, regular rhythm, S1 normal, S2 normal and normal heart sounds.  Exam reveals no gallop and no friction rub.    No murmur heard.  Pulmonary/Chest: Effort normal and breath sounds normal. No accessory muscle usage. No respiratory distress. He has no decreased breath sounds. He has no wheezes. He has no rhonchi. He has no rales.   Abdominal: Soft. Normal appearance. He exhibits no distension. There is no hepatosplenomegaly. There is no tenderness. There is no rigidity, no rebound and no guarding.   Musculoskeletal: Normal range of motion.   Neurological: He is alert and oriented to " person, place, and time. He has normal strength. No cranial nerve deficit or sensory deficit. He exhibits normal muscle tone. He displays no seizure activity. Coordination and gait normal.   Skin: Skin is warm, dry and intact. No rash noted. He is not diaphoretic.   Psychiatric: He has a normal mood and affect. His speech is normal and behavior is normal. Judgment and thought content normal. Cognition and memory are normal. He is attentive.       Assessment & Plan    1. Diet-controlled diabetes mellitus  Patient is encouraged to continue a diet low in carbohydrates and simple sugars.  Advised to begin or continue a weight loss program which focuses on good food choices and increased physical activity and encouraged to adhere to diet and lifestyle regimen and check glucose as recommended daily and report any changes in usual trends.  Screening blood test is due in 6 weeks.  Foot exam was not done at this visit.   - Basic metabolic panel; Future  - Hemoglobin A1c; Future    2. Essential hypertension  Patient was counseled and encouraged to maintain a low sodium diet, as well as increasing physical activity.  Recommend random BP checks at home on a regular basis.  Repeat BP at end of visit was not necessary. Will continue medication at this time, and follow up in 6 weeks, or sooner if blood pressure is not well controlled.  Will attempt to lower medication dosage due to weight loss and improved BP; symptoms may be associated with medication use and hypotension.  - Lipid panel; Future  - diltiaZEM (CARDIZEM CD) 240 MG 24 hr capsule; Take 1 capsule (240 mg total) by mouth once daily.  Dispense: 30 capsule; Refill: 2    3. History of cerebral parenchymal hemorrhage  Stable; no therapeutic changes at this time.       Follow up documented    ACTIVE MEDICAL ISSUES:  Documented in Problem List    PAST MEDICAL HISTORY  Documented    PAST SURGICAL HISTORY:  Documented    SOCIAL HISTORY:  Documented    FAMILY  HISTORY:  Documented    ALLERGIES AND MEDICATIONS: updated and reviewed.  Documented    Health Maintenance       Date Due Completion Date    Zoster Vaccine 01/21/2012 ---    Hemoglobin A1c 07/23/2017 1/23/2017    Override on 4/8/2016: Done (future)    Override on 1/4/2016: Done (future)    Override on 8/22/2014: Done    Influenza Vaccine 08/01/2017 12/3/2016    Override on 9/23/2015: Done (today in OV)    Lipid Panel 01/23/2018 1/23/2017    Override on 8/22/2014: Done    Foot Exam 03/07/2018 3/7/2017    Override on 7/20/2015: Done (today in OV)    Eye Exam 03/14/2018 3/14/2017 (Done)    Override on 3/14/2017: Done    Override on 4/8/2016: Done (future)    Override on 8/22/2014: Done    Fecal Occult Blood Test (FOBT)/[employer] 03/16/2018 3/16/2017    TETANUS VACCINE 02/19/2026 2/19/2016

## 2017-08-01 LAB
ESTIMATED AVG GLUCOSE: 120 MG/DL
HBA1C MFR BLD HPLC: 5.8 %

## 2017-08-14 ENCOUNTER — TELEPHONE (OUTPATIENT)
Dept: FAMILY MEDICINE | Facility: CLINIC | Age: 67
End: 2017-08-14

## 2017-08-14 NOTE — TELEPHONE ENCOUNTER
----- Message from Dunia Carrasco sent at 8/14/2017 12:15 PM CDT -----  Contact: self  Patient called requesting lab results. Please call [phone].    Thanks!

## 2017-08-15 NOTE — TELEPHONE ENCOUNTER
Diabetes improved  Kidney function worsening, avoid NSAIDs, be sure keeping hydrated   Cholesterol controlled

## 2017-08-16 NOTE — TELEPHONE ENCOUNTER
Patient informed of lab results.  Patient verbalized understanding, but is concerned about his use of aspirin 325 mg everyday.  Advised patient that concern will be addressed by Dr. Lombard and he will receive a call with Dr. Lombard's response.  Patient verbalized understanding.     Patient requested copy of results.  Copy mailed.

## 2017-08-23 ENCOUNTER — TELEPHONE (OUTPATIENT)
Dept: FAMILY MEDICINE | Facility: CLINIC | Age: 67
End: 2017-08-23

## 2017-08-23 NOTE — TELEPHONE ENCOUNTER
----- Message from Anabell Dutton sent at 8/22/2017  1:17 PM CDT -----  Contact: self  Patient need a Rx for a blood pressure monitor. Patient can be reached at [phone] or [phone].    thanks

## 2017-08-23 NOTE — TELEPHONE ENCOUNTER
----- Message from Anabell Dutton sent at 8/22/2017  1:17 PM CDT -----  Contact: self  Patient need a Rx for a blood pressure monitor. Patient can be reached at [phone] or [phone].    thanks

## 2017-08-24 RX ORDER — COLCHICINE 0.6 MG/1
TABLET, FILM COATED ORAL
Qty: 30 TABLET | Refills: 0 | Status: SHIPPED | OUTPATIENT
Start: 2017-08-24 | End: 2017-12-09 | Stop reason: SDUPTHER

## 2017-08-24 NOTE — TELEPHONE ENCOUNTER
Advised patient that he ill be notified of approval or denial of request when Dr. Lombard addresses message.

## 2017-08-24 NOTE — TELEPHONE ENCOUNTER
----- Message from Светлана Galaviz sent at 8/24/2017  2:04 PM CDT -----  Contact: [phone]  Pt is following up on his blood pressure test kit-large Kit Thanks !

## 2017-08-28 RX ORDER — ACETAMINOPHEN 500 MG
1 TABLET ORAL 2 TIMES DAILY
Qty: 1 EACH | Refills: 0 | Status: ON HOLD | OUTPATIENT
Start: 2017-08-28 | End: 2023-12-28 | Stop reason: HOSPADM

## 2017-08-30 DIAGNOSIS — J44.9 COPD, MODERATE: ICD-10-CM

## 2017-08-30 RX ORDER — FLUTICASONE PROPIONATE AND SALMETEROL 50; 250 UG/1; UG/1
POWDER RESPIRATORY (INHALATION)
Qty: 60 EACH | Refills: 2 | Status: SHIPPED | OUTPATIENT
Start: 2017-08-30 | End: 2018-01-30 | Stop reason: SDUPTHER

## 2017-09-01 ENCOUNTER — CLINICAL SUPPORT (OUTPATIENT)
Dept: FAMILY MEDICINE | Facility: CLINIC | Age: 67
End: 2017-09-01
Payer: MEDICARE

## 2017-09-01 NOTE — PROGRESS NOTES
Patient came in for blood pressure check.  Blood pressure 118/78 left arm, sitting, manual.  Patient stated he is taking medications as prescribed, last dose this morning.  Denies any chest pains, headaches, blurred vision, dizziness, SOB.

## 2017-09-14 DIAGNOSIS — J32.0 CHRONIC MAXILLARY SINUSITIS: ICD-10-CM

## 2017-09-15 RX ORDER — LEVOCETIRIZINE DIHYDROCHLORIDE 5 MG/1
TABLET, FILM COATED ORAL
Qty: 30 TABLET | Refills: 0 | Status: SHIPPED | OUTPATIENT
Start: 2017-09-15 | End: 2017-09-28 | Stop reason: SDUPTHER

## 2017-09-28 DIAGNOSIS — J32.0 CHRONIC MAXILLARY SINUSITIS: ICD-10-CM

## 2017-09-28 RX ORDER — LEVOCETIRIZINE DIHYDROCHLORIDE 5 MG/1
TABLET, FILM COATED ORAL
Qty: 30 TABLET | Refills: 0 | Status: SHIPPED | OUTPATIENT
Start: 2017-09-28 | End: 2017-11-22 | Stop reason: SDUPTHER

## 2017-10-06 ENCOUNTER — TELEPHONE (OUTPATIENT)
Dept: FAMILY MEDICINE | Facility: CLINIC | Age: 67
End: 2017-10-06

## 2017-10-06 NOTE — TELEPHONE ENCOUNTER
----- Message from Juan Canseco sent at 10/6/2017 11:39 AM CDT -----  Contact: [phone]  Calling to ask protocol to find out blood type

## 2017-11-07 DIAGNOSIS — I10 ESSENTIAL HYPERTENSION: ICD-10-CM

## 2017-11-07 RX ORDER — DILTIAZEM HYDROCHLORIDE 240 MG/1
CAPSULE, EXTENDED RELEASE ORAL
Qty: 30 CAPSULE | Refills: 2 | Status: SHIPPED | OUTPATIENT
Start: 2017-11-07 | End: 2018-01-30 | Stop reason: SDUPTHER

## 2017-11-22 DIAGNOSIS — J32.0 CHRONIC MAXILLARY SINUSITIS: ICD-10-CM

## 2017-11-22 RX ORDER — LEVOCETIRIZINE DIHYDROCHLORIDE 5 MG/1
TABLET, FILM COATED ORAL
Qty: 30 TABLET | Refills: 11 | Status: SHIPPED | OUTPATIENT
Start: 2017-11-22 | End: 2020-02-18 | Stop reason: CLARIF

## 2017-11-22 RX ORDER — DICLOFENAC SODIUM 10 MG/G
GEL TOPICAL
Qty: 100 G | Refills: 2 | Status: SHIPPED | OUTPATIENT
Start: 2017-11-22 | End: 2018-01-30 | Stop reason: CLARIF

## 2017-12-11 RX ORDER — COLCHICINE 0.6 MG/1
TABLET, FILM COATED ORAL
Qty: 30 TABLET | Refills: 0 | Status: SHIPPED | OUTPATIENT
Start: 2017-12-11 | End: 2018-01-30 | Stop reason: SDUPTHER

## 2017-12-11 RX ORDER — INDOMETHACIN 50 MG/1
CAPSULE ORAL
Qty: 30 CAPSULE | Refills: 0 | OUTPATIENT
Start: 2017-12-11

## 2017-12-19 RX ORDER — INDOMETHACIN 50 MG/1
CAPSULE ORAL
Qty: 30 CAPSULE | Refills: 0 | OUTPATIENT
Start: 2017-12-19

## 2017-12-26 NOTE — TELEPHONE ENCOUNTER
----- Message from Anabell Dutton sent at 12/26/2017  2:52 PM CST -----  Contact: self  Patient is requesting a refill on indomethacin (INDOCIN. Patient can be reached at [phone] or  [phone].      Thanks,

## 2017-12-28 ENCOUNTER — TELEPHONE (OUTPATIENT)
Dept: FAMILY MEDICINE | Facility: CLINIC | Age: 67
End: 2017-12-28

## 2017-12-28 RX ORDER — INDOMETHACIN 50 MG/1
50 CAPSULE ORAL 3 TIMES DAILY PRN
Qty: 30 CAPSULE | Refills: 5 | Status: SHIPPED | OUTPATIENT
Start: 2017-12-28 | End: 2018-01-30 | Stop reason: SINTOL

## 2017-12-28 NOTE — TELEPHONE ENCOUNTER
----- Message from Juan Canseco sent at 12/28/2017  4:24 PM CST -----  Contact: PT /[phone]  Calling TO speak with nurse regarding confirmation that Respiratory therapist sent script over.

## 2018-01-30 ENCOUNTER — OFFICE VISIT (OUTPATIENT)
Dept: FAMILY MEDICINE | Facility: CLINIC | Age: 68
End: 2018-01-30
Payer: MEDICARE

## 2018-01-30 ENCOUNTER — LAB VISIT (OUTPATIENT)
Dept: LAB | Facility: HOSPITAL | Age: 68
End: 2018-01-30
Payer: MEDICARE

## 2018-01-30 VITALS
RESPIRATION RATE: 17 BRPM | OXYGEN SATURATION: 97 % | HEART RATE: 78 BPM | HEIGHT: 69 IN | SYSTOLIC BLOOD PRESSURE: 120 MMHG | WEIGHT: 315 LBS | BODY MASS INDEX: 46.65 KG/M2 | DIASTOLIC BLOOD PRESSURE: 76 MMHG | TEMPERATURE: 98 F

## 2018-01-30 DIAGNOSIS — M1A.09X1 IDIOPATHIC CHRONIC GOUT OF MULTIPLE SITES WITH TOPHUS: ICD-10-CM

## 2018-01-30 DIAGNOSIS — E66.01 MORBID OBESITY WITH BMI OF 45.0-49.9, ADULT: ICD-10-CM

## 2018-01-30 DIAGNOSIS — N18.30 TYPE 2 DIABETES MELLITUS WITH STAGE 3 CHRONIC KIDNEY DISEASE, WITHOUT LONG-TERM CURRENT USE OF INSULIN: ICD-10-CM

## 2018-01-30 DIAGNOSIS — M15.9 PRIMARY OSTEOARTHRITIS INVOLVING MULTIPLE JOINTS: ICD-10-CM

## 2018-01-30 DIAGNOSIS — E11.22 TYPE 2 DIABETES MELLITUS WITH STAGE 3 CHRONIC KIDNEY DISEASE, WITHOUT LONG-TERM CURRENT USE OF INSULIN: ICD-10-CM

## 2018-01-30 DIAGNOSIS — I10 ESSENTIAL HYPERTENSION: ICD-10-CM

## 2018-01-30 DIAGNOSIS — E11.42 DIABETIC POLYNEUROPATHY ASSOCIATED WITH TYPE 2 DIABETES MELLITUS: ICD-10-CM

## 2018-01-30 DIAGNOSIS — N18.30 TYPE 2 DIABETES MELLITUS WITH STAGE 3 CHRONIC KIDNEY DISEASE, WITHOUT LONG-TERM CURRENT USE OF INSULIN: Primary | ICD-10-CM

## 2018-01-30 DIAGNOSIS — J44.9 COPD, MODERATE: ICD-10-CM

## 2018-01-30 DIAGNOSIS — E11.22 TYPE 2 DIABETES MELLITUS WITH STAGE 3 CHRONIC KIDNEY DISEASE, WITHOUT LONG-TERM CURRENT USE OF INSULIN: Primary | ICD-10-CM

## 2018-01-30 DIAGNOSIS — E11.9 DIET-CONTROLLED DIABETES MELLITUS: ICD-10-CM

## 2018-01-30 DIAGNOSIS — G47.33 OBSTRUCTIVE SLEEP APNEA ON CPAP: ICD-10-CM

## 2018-01-30 LAB
CREAT UR-MCNC: 194 MG/DL
ESTIMATED AVG GLUCOSE: 117 MG/DL
HBA1C MFR BLD HPLC: 5.7 %
MICROALBUMIN UR DL<=1MG/L-MCNC: 13 UG/ML
MICROALBUMIN/CREATININE RATIO: 6.7 UG/MG

## 2018-01-30 PROCEDURE — 99999 PR PBB SHADOW E&M-EST. PATIENT-LVL III: CPT | Mod: PBBFAC,,, | Performed by: FAMILY MEDICINE

## 2018-01-30 PROCEDURE — 99214 OFFICE O/P EST MOD 30 MIN: CPT | Mod: S$GLB,,, | Performed by: FAMILY MEDICINE

## 2018-01-30 PROCEDURE — 82043 UR ALBUMIN QUANTITATIVE: CPT

## 2018-01-30 PROCEDURE — 99499 UNLISTED E&M SERVICE: CPT | Mod: S$GLB,,, | Performed by: FAMILY MEDICINE

## 2018-01-30 PROCEDURE — 83036 HEMOGLOBIN GLYCOSYLATED A1C: CPT

## 2018-01-30 PROCEDURE — 36415 COLL VENOUS BLD VENIPUNCTURE: CPT | Mod: PO

## 2018-01-30 RX ORDER — GABAPENTIN 600 MG/1
600 TABLET ORAL 3 TIMES DAILY
Qty: 90 TABLET | Refills: 2 | Status: SHIPPED | OUTPATIENT
Start: 2018-01-30 | End: 2018-06-11 | Stop reason: SDUPTHER

## 2018-01-30 RX ORDER — ACETAMINOPHEN AND CODEINE PHOSPHATE 300; 30 MG/1; MG/1
TABLET ORAL
COMMUNITY
Start: 2018-01-13 | End: 2019-11-04

## 2018-01-30 RX ORDER — FLUTICASONE PROPIONATE AND SALMETEROL 250; 50 UG/1; UG/1
POWDER RESPIRATORY (INHALATION)
Qty: 60 EACH | Refills: 2 | Status: SHIPPED | OUTPATIENT
Start: 2018-01-30 | End: 2019-03-22 | Stop reason: SDUPTHER

## 2018-01-30 RX ORDER — COLCHICINE 0.6 MG/1
TABLET, FILM COATED ORAL
Qty: 30 TABLET | Refills: 2 | Status: SHIPPED | OUTPATIENT
Start: 2018-01-30 | End: 2018-08-08 | Stop reason: SDUPTHER

## 2018-01-30 RX ORDER — LISINOPRIL AND HYDROCHLOROTHIAZIDE 20; 25 MG/1; MG/1
1 TABLET ORAL DAILY
Qty: 90 TABLET | Refills: 1 | Status: SHIPPED | OUTPATIENT
Start: 2018-01-30 | End: 2018-06-24 | Stop reason: SDUPTHER

## 2018-01-30 RX ORDER — DILTIAZEM HYDROCHLORIDE 240 MG/1
240 CAPSULE, COATED, EXTENDED RELEASE ORAL DAILY
Qty: 90 CAPSULE | Refills: 1 | Status: SHIPPED | OUTPATIENT
Start: 2018-01-30 | End: 2018-08-08 | Stop reason: SDUPTHER

## 2018-01-30 RX ORDER — DICLOFENAC SODIUM 10 MG/G
2 GEL TOPICAL DAILY
Qty: 100 G | Refills: 5 | Status: SHIPPED | OUTPATIENT
Start: 2018-01-30 | End: 2019-04-28 | Stop reason: SDUPTHER

## 2018-01-30 NOTE — PROGRESS NOTES
Chief Complaint   Patient presents with    Diabetes     follow up     Routine Foot Care       Bj Pate Jr. is a 68 y.o. male who presents per the Chief Complaint.  Pt is known to me and was last seen by me on 7/31/2017.  All known chronic medical issues have been documented.       Diabetes   He presents for his follow-up diabetic visit. He has type 2 diabetes mellitus. His disease course has been stable. There are no hypoglycemic associated symptoms. Pertinent negatives for hypoglycemia include no confusion, dizziness (resolving), headaches, mood changes, nervousness/anxiousness, pallor, seizures, sleepiness, speech difficulty, sweats or tremors. There are no diabetic associated symptoms. Pertinent negatives for diabetes include no blurred vision, no chest pain, no fatigue, no foot paresthesias, no foot ulcerations, no polydipsia, no polyphagia, no polyuria, no visual change, no weakness and no weight loss. There are no hypoglycemic complications. Symptoms are stable. There are no diabetic complications. Pertinent negatives for diabetic complications include no CVA, PVD or retinopathy. Risk factors for coronary artery disease include diabetes mellitus, male sex, obesity, hypertension and dyslipidemia. When asked about current treatments, none were reported. His weight is stable. He is following a generally healthy diet. When asked about meal planning, he reported none. He has not had a previous visit with a dietitian. He rarely participates in exercise. There is no change in his home blood glucose trend. An ACE inhibitor/angiotensin II receptor blocker is being taken. He sees a podiatrist.Eye exam is current.   Hypertension   This is a chronic problem. The current episode started more than 1 year ago. The problem has been gradually worsening since onset. The problem is uncontrolled. Pertinent negatives include no anxiety, blurred vision, chest pain, headaches, malaise/fatigue, neck pain, orthopnea,  palpitations, peripheral edema, PND, shortness of breath or sweats. Agents associated with hypertension include amphetamines. Risk factors for coronary artery disease include obesity, male gender, dyslipidemia and sedentary lifestyle. Past treatments include ACE inhibitors, calcium channel blockers and diuretics. The current treatment provides moderate improvement. Compliance problems include exercise.  There is no history of angina, kidney disease (improved), CAD/MI, CVA, heart failure, left ventricular hypertrophy, PVD, renovascular disease or retinopathy. Identifiable causes of hypertension include sleep apnea. There is no history of chronic renal disease (improved), coarctation of the aorta, hyperaldosteronism, hypercortisolism, hyperparathyroidism, a hypertension causing med, pheochromocytoma or a thyroid problem.        ROS  Review of Systems   Constitutional: Negative.  Negative for activity change, appetite change, chills, diaphoresis, fatigue, fever, malaise/fatigue, unexpected weight change and weight loss.   HENT: Negative.  Negative for congestion, ear pain, hearing loss, nosebleeds, postnasal drip, rhinorrhea, sinus pressure, sneezing, sore throat and trouble swallowing.    Eyes: Negative for blurred vision, pain and visual disturbance.   Respiratory: Negative for cough, choking and shortness of breath.    Cardiovascular: Negative for chest pain, palpitations, orthopnea, leg swelling and PND.   Gastrointestinal: Negative for abdominal pain, constipation, diarrhea, nausea and vomiting.   Endocrine: Negative for polydipsia, polyphagia and polyuria.   Genitourinary: Negative for difficulty urinating, dysuria, frequency and urgency.   Musculoskeletal: Positive for arthralgias. Negative for back pain, gait problem, joint swelling, myalgias and neck pain.   Skin: Negative.  Negative for pallor.   Allergic/Immunologic: Negative for environmental allergies and food allergies.   Neurological: Positive for  "numbness (tingling legs and arms). Negative for dizziness (resolving), tremors, seizures, syncope, speech difficulty, weakness, light-headedness and headaches.   Psychiatric/Behavioral: Negative.  Negative for confusion, decreased concentration, dysphoric mood and sleep disturbance. The patient is not nervous/anxious.        Physical Exam  Vitals:    01/30/18 1029   BP: 120/76   Pulse: 78   Resp: 17   Temp: 98.3 °F (36.8 °C)    Body mass index is 48.83 kg/m².  Weight: (!) 150 kg (330 lb 11 oz)   Height: 5' 9" (175.3 cm)     Physical Exam   Constitutional: He is oriented to person, place, and time. He appears well-developed and well-nourished. He is active and cooperative.  Non-toxic appearance. He does not have a sickly appearance. He does not appear ill. No distress.   HENT:   Head: Normocephalic and atraumatic.   Right Ear: Hearing and external ear normal. No decreased hearing is noted.   Left Ear: Hearing and external ear normal. No decreased hearing is noted.   Nose: Nose normal. No rhinorrhea or nasal deformity.   Mouth/Throat: Uvula is midline and oropharynx is clear and moist. He does not have dentures. Normal dentition.   Eyes: Conjunctivae, EOM and lids are normal. Pupils are equal, round, and reactive to light. Right eye exhibits no chemosis, no discharge and no exudate. No foreign body present in the right eye. Left eye exhibits no chemosis, no discharge and no exudate. No foreign body present in the left eye. No scleral icterus.   Neck: Normal range of motion and full passive range of motion without pain. Neck supple.   Cardiovascular: Normal rate, regular rhythm, S1 normal, S2 normal and normal heart sounds.  Exam reveals no gallop and no friction rub.    No murmur heard.  Pulmonary/Chest: Effort normal and breath sounds normal. No accessory muscle usage. No respiratory distress. He has no decreased breath sounds. He has no wheezes. He has no rhonchi. He has no rales.   Abdominal: Soft. Normal " appearance. He exhibits no distension. There is no hepatosplenomegaly. There is no tenderness. There is no rigidity, no rebound and no guarding.   Musculoskeletal:        Right hand: He exhibits decreased range of motion and swelling. He exhibits no tenderness, no bony tenderness, normal two-point discrimination, normal capillary refill, no deformity and no laceration. Normal sensation noted. Decreased sensation is not present in the ulnar distribution, is not present in the medial distribution and is not present in the radial distribution. Normal strength noted. He exhibits no finger abduction, no thumb/finger opposition and no wrist extension trouble.        Left hand: He exhibits decreased range of motion and swelling. He exhibits no tenderness, no bony tenderness, normal two-point discrimination, normal capillary refill, no deformity and no laceration. Normal sensation noted. Decreased sensation is not present in the ulnar distribution, is not present in the medial redistribution and is not present in the radial distribution. Normal strength noted. He exhibits no finger abduction, no thumb/finger opposition and no wrist extension trouble.        Hands:  Neurological: He is alert and oriented to person, place, and time. He has normal strength. No cranial nerve deficit or sensory deficit. He exhibits normal muscle tone. He displays no seizure activity. Coordination and gait normal.   Skin: Skin is warm, dry and intact. No rash noted. He is not diaphoretic.   Psychiatric: He has a normal mood and affect. His speech is normal and behavior is normal. Judgment and thought content normal. Cognition and memory are normal. He is attentive.       Assessment & Plan    1. Type 2 diabetes mellitus with stage 3 chronic kidney disease, without long-term current use of insulin  Patient is encouraged to follow a diet low in carbohydrates and simple sugars.  Advised to focus on good food choices and increased physical activity  and encouraged to adhere to medication regimen and check glucose as recommended daily and contact office if glucose levels are increasing over time.  Screening blood test is due at this time.  Foot exam was done at this visit.  Complete diabetic foot exam was performed at this visit.  Feet were exposed and checked for abnormality and 10 g monofilament testing was performed.  Visual inspection and pedal pulses normal and protective sensation intact.  Patient was advised to use caution with bare feet and to avoid traveling outside without feet being protected.   - Microalbumin/creatinine urine ratio  - Hemoglobin A1c; Future    2. Essential hypertension  Patient was counseled and encouraged to maintain a low sodium diet, as well as increasing physical activity.  Recommend random BP checks at home on a regular basis.  Repeat BP at end of visit was not necessary. Will continue medication at this time, and follow up in 3-6 months, or sooner if blood pressure begins to increase.     - diltiaZEM (CARTIA XT) 240 MG 24 hr capsule; Take 1 capsule (240 mg total) by mouth once daily.  Dispense: 90 capsule; Refill: 1  - lisinopril-hydrochlorothiazide (PRINZIDE,ZESTORETIC) 20-25 mg Tab; Take 1 tablet by mouth once daily.  Dispense: 90 tablet; Refill: 1    3. Primary osteoarthritis involving multiple joints  The current medical regimen is effective at this time and no changes to present plan or medications will be made at this visit.     - diclofenac sodium 1 % Gel; Apply 2 g topically once daily.  Dispense: 100 g; Refill: 5    4. Diabetic polyneuropathy associated with type 2 diabetes mellitus  Will start neuropathic therapy at this visit.  - gabapentin (NEURONTIN) 600 MG tablet; Take 1 tablet (600 mg total) by mouth 3 (three) times daily.  Dispense: 90 tablet; Refill: 2    5. COPD, moderate  Stable; no therapeutic changes at this time.  Patient reports no significant improvement with inhaler; will hold to monitor symptoms and  continue if condition worsens.  - fluticasone-salmeterol 250-50 mcg/dose (ADVAIR DISKUS) 250-50 mcg/dose diskus inhaler; INHALE ONE PUFF INTO LUNGS TWICE DAILY  Dispense: 60 each; Refill: 2    6. Obstructive sleep apnea on CPAP: setting = 8  The current medical regimen is effective at this time and no changes to present plan or medications will be made at this visit.   Supplies ordered.  - CPAP/BIPAP SUPPLIES    7. Idiopathic chronic gout of multiple sites with tophus  Will stop indomethacin due to CKD; continue colcrys as needed only.  - COLCRYS 0.6 mg tablet; TAKE 2 TABLETS BY MOUTH AT THE FIRST SIGN OF GOUT FLARE THEN 1 TABLET 1 HOUR LATER  Dispense: 30 tablet; Refill: 2    8. Diet-controlled diabetes mellitus  Screening test will be ordered and once results available patient will be notified of results and managed accordingly.      9. Morbid obesity with BMI of 45.0-49.9, adult  We discussed weight and safe, effective ways of losing pounds, including low carbohydrate, low fat diet and increasing physical activity to improve cardiovascular performance and increase metabolism.  Patient was encouraged to set realistic attainable goals for weight loss, and we will follow up periodically.       Follow up documented    ACTIVE MEDICAL ISSUES:  Documented in Problem List    PAST MEDICAL HISTORY  Documented    PAST SURGICAL HISTORY:  Documented    SOCIAL HISTORY:  Documented    FAMILY HISTORY:  Documented    ALLERGIES AND MEDICATIONS: updated and reviewed.  Documented    Health Maintenance       Date Due Completion Date    Zoster Vaccine 01/21/2012 ---    Urine Microalbumin 07/20/2016 7/20/2015    Override on 8/22/2014: Done    Hemoglobin A1c 01/31/2018 7/31/2017    Override on 4/8/2016: Done (future)    Override on 1/4/2016: Done (future)    Override on 8/22/2014: Done    Foot Exam 03/07/2018 3/7/2017    Override on 7/20/2015: Done (today in OV)    Eye Exam 03/14/2018 3/14/2017 (Done)    Override on 3/14/2017: Done     Override on 4/8/2016: Done (future)    Override on 8/22/2014: Done    Fecal Occult Blood Test (FOBT)/[facility] 03/16/2018 3/16/2017    Lipid Panel 07/31/2018 7/31/2017    Override on 8/22/2014: Done    TETANUS VACCINE 02/19/2026 2/19/2016

## 2018-02-07 DIAGNOSIS — E78.5 DYSLIPIDEMIA: ICD-10-CM

## 2018-02-07 RX ORDER — ATORVASTATIN CALCIUM 40 MG/1
TABLET, FILM COATED ORAL
Qty: 90 TABLET | Refills: 1 | Status: SHIPPED | OUTPATIENT
Start: 2018-02-07 | End: 2018-07-23 | Stop reason: SDUPTHER

## 2018-03-09 ENCOUNTER — TELEPHONE (OUTPATIENT)
Dept: FAMILY MEDICINE | Facility: CLINIC | Age: 68
End: 2018-03-09

## 2018-06-11 DIAGNOSIS — E11.42 DIABETIC POLYNEUROPATHY ASSOCIATED WITH TYPE 2 DIABETES MELLITUS: ICD-10-CM

## 2018-06-12 RX ORDER — GABAPENTIN 600 MG/1
TABLET ORAL
Qty: 90 TABLET | Refills: 2 | Status: SHIPPED | OUTPATIENT
Start: 2018-06-12 | End: 2018-10-01 | Stop reason: SDUPTHER

## 2018-06-24 DIAGNOSIS — I10 ESSENTIAL HYPERTENSION: ICD-10-CM

## 2018-06-25 RX ORDER — LISINOPRIL AND HYDROCHLOROTHIAZIDE 20; 25 MG/1; MG/1
TABLET ORAL
Qty: 90 TABLET | Refills: 1 | Status: SHIPPED | OUTPATIENT
Start: 2018-06-25 | End: 2019-01-23 | Stop reason: SDUPTHER

## 2018-07-23 DIAGNOSIS — N18.30 TYPE 2 DIABETES MELLITUS WITH STAGE 3 CHRONIC KIDNEY DISEASE, WITHOUT LONG-TERM CURRENT USE OF INSULIN: Primary | ICD-10-CM

## 2018-07-23 DIAGNOSIS — E78.5 DYSLIPIDEMIA: ICD-10-CM

## 2018-07-23 DIAGNOSIS — I10 ESSENTIAL HYPERTENSION: ICD-10-CM

## 2018-07-23 DIAGNOSIS — E11.22 TYPE 2 DIABETES MELLITUS WITH STAGE 3 CHRONIC KIDNEY DISEASE, WITHOUT LONG-TERM CURRENT USE OF INSULIN: Primary | ICD-10-CM

## 2018-07-23 DIAGNOSIS — Z12.5 SCREENING PSA (PROSTATE SPECIFIC ANTIGEN): ICD-10-CM

## 2018-07-23 RX ORDER — ATORVASTATIN CALCIUM 40 MG/1
TABLET, FILM COATED ORAL
Qty: 90 TABLET | Refills: 0 | Status: SHIPPED | OUTPATIENT
Start: 2018-07-23 | End: 2018-11-02 | Stop reason: SDUPTHER

## 2018-07-25 ENCOUNTER — LAB VISIT (OUTPATIENT)
Dept: LAB | Facility: HOSPITAL | Age: 68
End: 2018-07-25
Attending: FAMILY MEDICINE
Payer: MEDICARE

## 2018-07-25 DIAGNOSIS — N18.30 TYPE 2 DIABETES MELLITUS WITH STAGE 3 CHRONIC KIDNEY DISEASE, WITHOUT LONG-TERM CURRENT USE OF INSULIN: ICD-10-CM

## 2018-07-25 DIAGNOSIS — E78.5 DYSLIPIDEMIA: ICD-10-CM

## 2018-07-25 DIAGNOSIS — Z12.5 SCREENING PSA (PROSTATE SPECIFIC ANTIGEN): ICD-10-CM

## 2018-07-25 DIAGNOSIS — E11.22 TYPE 2 DIABETES MELLITUS WITH STAGE 3 CHRONIC KIDNEY DISEASE, WITHOUT LONG-TERM CURRENT USE OF INSULIN: ICD-10-CM

## 2018-07-25 DIAGNOSIS — I10 ESSENTIAL HYPERTENSION: ICD-10-CM

## 2018-07-25 LAB
ALBUMIN SERPL BCP-MCNC: 3.4 G/DL
ALP SERPL-CCNC: 99 U/L
ALT SERPL W/O P-5'-P-CCNC: 16 U/L
ANION GAP SERPL CALC-SCNC: 9 MMOL/L
AST SERPL-CCNC: 24 U/L
BILIRUB SERPL-MCNC: 0.7 MG/DL
BUN SERPL-MCNC: 22 MG/DL
CALCIUM SERPL-MCNC: 9.1 MG/DL
CHLORIDE SERPL-SCNC: 102 MMOL/L
CHOLEST SERPL-MCNC: 92 MG/DL
CHOLEST/HDLC SERPL: 2.4 {RATIO}
CO2 SERPL-SCNC: 24 MMOL/L
COMPLEXED PSA SERPL-MCNC: 0.53 NG/ML
CREAT SERPL-MCNC: 1.9 MG/DL
EST. GFR  (AFRICAN AMERICAN): 41.5 ML/MIN/1.73 M^2
EST. GFR  (NON AFRICAN AMERICAN): 35.9 ML/MIN/1.73 M^2
ESTIMATED AVG GLUCOSE: 114 MG/DL
GLUCOSE SERPL-MCNC: 87 MG/DL
HBA1C MFR BLD HPLC: 5.6 %
HDLC SERPL-MCNC: 38 MG/DL
HDLC SERPL: 41.3 %
LDLC SERPL CALC-MCNC: 44.6 MG/DL
NONHDLC SERPL-MCNC: 54 MG/DL
POTASSIUM SERPL-SCNC: 4.5 MMOL/L
PROT SERPL-MCNC: 7.4 G/DL
SODIUM SERPL-SCNC: 135 MMOL/L
TRIGL SERPL-MCNC: 47 MG/DL

## 2018-07-25 PROCEDURE — 84153 ASSAY OF PSA TOTAL: CPT

## 2018-07-25 PROCEDURE — 80061 LIPID PANEL: CPT

## 2018-07-25 PROCEDURE — 80053 COMPREHEN METABOLIC PANEL: CPT

## 2018-07-25 PROCEDURE — 36415 COLL VENOUS BLD VENIPUNCTURE: CPT | Mod: PO

## 2018-07-25 PROCEDURE — 83036 HEMOGLOBIN GLYCOSYLATED A1C: CPT

## 2018-08-08 ENCOUNTER — OFFICE VISIT (OUTPATIENT)
Dept: FAMILY MEDICINE | Facility: CLINIC | Age: 68
End: 2018-08-08
Payer: MEDICARE

## 2018-08-08 ENCOUNTER — TELEPHONE (OUTPATIENT)
Dept: FAMILY MEDICINE | Facility: CLINIC | Age: 68
End: 2018-08-08

## 2018-08-08 VITALS
WEIGHT: 315 LBS | RESPIRATION RATE: 17 BRPM | DIASTOLIC BLOOD PRESSURE: 60 MMHG | HEIGHT: 69 IN | OXYGEN SATURATION: 97 % | BODY MASS INDEX: 46.65 KG/M2 | SYSTOLIC BLOOD PRESSURE: 102 MMHG | TEMPERATURE: 98 F | HEART RATE: 72 BPM

## 2018-08-08 DIAGNOSIS — E11.22 TYPE 2 DIABETES MELLITUS WITH STAGE 3 CHRONIC KIDNEY DISEASE, WITHOUT LONG-TERM CURRENT USE OF INSULIN: ICD-10-CM

## 2018-08-08 DIAGNOSIS — N18.30 TYPE 2 DIABETES MELLITUS WITH STAGE 3 CHRONIC KIDNEY DISEASE, WITHOUT LONG-TERM CURRENT USE OF INSULIN: ICD-10-CM

## 2018-08-08 DIAGNOSIS — G47.33 OBSTRUCTIVE SLEEP APNEA ON CPAP: ICD-10-CM

## 2018-08-08 DIAGNOSIS — I10 ESSENTIAL HYPERTENSION: Primary | ICD-10-CM

## 2018-08-08 DIAGNOSIS — M1A.09X1 IDIOPATHIC CHRONIC GOUT OF MULTIPLE SITES WITH TOPHUS: ICD-10-CM

## 2018-08-08 DIAGNOSIS — Z12.11 SCREENING FOR COLON CANCER: ICD-10-CM

## 2018-08-08 PROCEDURE — 99214 OFFICE O/P EST MOD 30 MIN: CPT | Mod: S$GLB,,, | Performed by: FAMILY MEDICINE

## 2018-08-08 PROCEDURE — 99999 PR PBB SHADOW E&M-EST. PATIENT-LVL III: CPT | Mod: PBBFAC,,, | Performed by: FAMILY MEDICINE

## 2018-08-08 PROCEDURE — 3078F DIAST BP <80 MM HG: CPT | Mod: CPTII,S$GLB,, | Performed by: FAMILY MEDICINE

## 2018-08-08 PROCEDURE — 3074F SYST BP LT 130 MM HG: CPT | Mod: CPTII,S$GLB,, | Performed by: FAMILY MEDICINE

## 2018-08-08 PROCEDURE — 3044F HG A1C LEVEL LT 7.0%: CPT | Mod: CPTII,S$GLB,, | Performed by: FAMILY MEDICINE

## 2018-08-08 RX ORDER — COLCHICINE 0.6 MG/1
TABLET, FILM COATED ORAL
Qty: 30 TABLET | Refills: 2 | Status: SHIPPED | OUTPATIENT
Start: 2018-08-08 | End: 2019-11-15 | Stop reason: SDUPTHER

## 2018-08-08 RX ORDER — DILTIAZEM HYDROCHLORIDE 240 MG/1
240 CAPSULE, COATED, EXTENDED RELEASE ORAL DAILY
Qty: 90 CAPSULE | Refills: 1 | Status: SHIPPED | OUTPATIENT
Start: 2018-08-08 | End: 2019-01-23 | Stop reason: SDUPTHER

## 2018-08-08 NOTE — PROGRESS NOTES
Chief Complaint   Patient presents with    Diabetes     6 months follow up     Abdominal Pain     lower abdominal     Back Pain       Bj Pate Jr. is a 68 y.o. male who presents per the Chief Complaint.  Pt is known to me and was last seen by me on 1/30/2018.  All known chronic medical issues have been documented.       Diabetes   He presents for his follow-up diabetic visit. He has type 2 diabetes mellitus. His disease course has been stable. There are no hypoglycemic associated symptoms. Pertinent negatives for hypoglycemia include no confusion, dizziness (resolving), headaches, mood changes, nervousness/anxiousness, pallor, seizures, sleepiness, speech difficulty, sweats or tremors. There are no diabetic associated symptoms. Pertinent negatives for diabetes include no blurred vision, no chest pain, no fatigue, no foot paresthesias, no foot ulcerations, no polydipsia, no polyphagia, no polyuria, no visual change, no weakness and no weight loss. There are no hypoglycemic complications. Symptoms are stable. There are no diabetic complications. Pertinent negatives for diabetic complications include no CVA, PVD or retinopathy. Risk factors for coronary artery disease include diabetes mellitus, male sex, obesity, hypertension and dyslipidemia. When asked about current treatments, none were reported. His weight is stable. He is following a generally healthy diet. When asked about meal planning, he reported none. He has not had a previous visit with a dietitian. He rarely participates in exercise. There is no change in his home blood glucose trend. An ACE inhibitor/angiotensin II receptor blocker is being taken. He sees a podiatrist.Eye exam is current.   Abdominal Pain   This is a new problem. The current episode started more than 1 month ago. The problem occurs daily. The problem has been unchanged. The pain is located in the left flank, right flank, RLQ and RUQ. The pain is at a severity of 4/10. The quality  of the pain is aching and cramping. Associated symptoms include arthralgias. Pertinent negatives include no constipation, diarrhea, dysuria, fever, frequency, headaches, myalgias, nausea, vomiting or weight loss. The pain is aggravated by movement.   Back Pain   Associated symptoms include abdominal pain and numbness (tingling legs and arms). Pertinent negatives include no chest pain, dysuria, fever, headaches, weakness or weight loss.   Hypertension   This is a chronic problem. The current episode started more than 1 year ago. The problem has been gradually worsening since onset. The problem is uncontrolled. Pertinent negatives include no anxiety, blurred vision, chest pain, headaches, malaise/fatigue, neck pain, orthopnea, palpitations, peripheral edema, PND, shortness of breath or sweats. Agents associated with hypertension include amphetamines. Risk factors for coronary artery disease include obesity, male gender, dyslipidemia and sedentary lifestyle. Past treatments include ACE inhibitors, calcium channel blockers and diuretics. The current treatment provides moderate improvement. Compliance problems include exercise.  There is no history of angina, kidney disease (improved), CAD/MI, CVA, heart failure, left ventricular hypertrophy, PVD or retinopathy. Identifiable causes of hypertension include sleep apnea. There is no history of chronic renal disease (improved), coarctation of the aorta, hyperaldosteronism, hypercortisolism, hyperparathyroidism, a hypertension causing med, pheochromocytoma, renovascular disease or a thyroid problem.        ROS  Review of Systems   Constitutional: Negative.  Negative for activity change, appetite change, chills, diaphoresis, fatigue, fever, malaise/fatigue, unexpected weight change and weight loss.   HENT: Negative.  Negative for congestion, ear pain, hearing loss, nosebleeds, postnasal drip, rhinorrhea, sinus pressure, sneezing, sore throat and trouble swallowing.    Eyes:  "Negative for blurred vision, pain and visual disturbance.   Respiratory: Negative for cough, choking and shortness of breath.    Cardiovascular: Negative for chest pain, palpitations, orthopnea, leg swelling and PND.   Gastrointestinal: Positive for abdominal pain. Negative for constipation, diarrhea, nausea and vomiting.   Endocrine: Negative for polydipsia, polyphagia and polyuria.   Genitourinary: Negative for difficulty urinating, dysuria, frequency and urgency.   Musculoskeletal: Positive for arthralgias and back pain. Negative for gait problem, joint swelling, myalgias and neck pain.   Skin: Negative.  Negative for pallor.   Allergic/Immunologic: Negative for environmental allergies and food allergies.   Neurological: Positive for numbness (tingling legs and arms). Negative for dizziness (resolving), tremors, seizures, syncope, speech difficulty, weakness, light-headedness and headaches.   Psychiatric/Behavioral: Negative.  Negative for confusion, decreased concentration, dysphoric mood and sleep disturbance. The patient is not nervous/anxious.        Physical Exam  Vitals:    08/08/18 1053   BP: 102/60   Pulse: 72   Resp: 17   Temp: 98.2 °F (36.8 °C)    Body mass index is 46.95 kg/m².  Weight: (!) 144.2 kg (317 lb 14.5 oz)   Height: 5' 9" (175.3 cm)     Physical Exam   Constitutional: He is oriented to person, place, and time. He appears well-developed and well-nourished. He is active and cooperative.  Non-toxic appearance. He does not have a sickly appearance. He does not appear ill. No distress.   HENT:   Head: Normocephalic and atraumatic.   Right Ear: Hearing and external ear normal. No decreased hearing is noted.   Left Ear: Hearing and external ear normal. No decreased hearing is noted.   Nose: Nose normal. No rhinorrhea or nasal deformity.   Mouth/Throat: Uvula is midline and oropharynx is clear and moist. He does not have dentures. Normal dentition.   Eyes: Conjunctivae, EOM and lids are normal. " Pupils are equal, round, and reactive to light. Right eye exhibits no chemosis, no discharge and no exudate. No foreign body present in the right eye. Left eye exhibits no chemosis, no discharge and no exudate. No foreign body present in the left eye. No scleral icterus.   Neck: Normal range of motion and full passive range of motion without pain. Neck supple.   Cardiovascular: Normal rate, regular rhythm, S1 normal, S2 normal and normal heart sounds. Exam reveals no gallop and no friction rub.   No murmur heard.  Pulmonary/Chest: Effort normal and breath sounds normal. No accessory muscle usage. No respiratory distress. He has no decreased breath sounds. He has no wheezes. He has no rhonchi. He has no rales.   Abdominal: Soft. Normal appearance. He exhibits no distension. There is no hepatosplenomegaly. There is no tenderness. There is no rigidity, no rebound and no guarding.   Musculoskeletal:        Right hand: He exhibits decreased range of motion and swelling. He exhibits no tenderness, no bony tenderness, normal two-point discrimination, normal capillary refill, no deformity and no laceration. Normal sensation noted. Decreased sensation is not present in the ulnar distribution, is not present in the medial distribution and is not present in the radial distribution. Normal strength noted. He exhibits no finger abduction, no thumb/finger opposition and no wrist extension trouble.        Left hand: He exhibits decreased range of motion and swelling. He exhibits no tenderness, no bony tenderness, normal two-point discrimination, normal capillary refill, no deformity and no laceration. Normal sensation noted. Decreased sensation is not present in the ulnar distribution, is not present in the medial redistribution and is not present in the radial distribution. Normal strength noted. He exhibits no finger abduction, no thumb/finger opposition and no wrist extension trouble.        Hands:  Neurological: He is alert  and oriented to person, place, and time. He has normal strength. No cranial nerve deficit or sensory deficit. He exhibits normal muscle tone. He displays no seizure activity. Coordination and gait normal.   Skin: Skin is warm, dry and intact. No rash noted. He is not diaphoretic.   Psychiatric: He has a normal mood and affect. His speech is normal and behavior is normal. Judgment and thought content normal. Cognition and memory are normal. He is attentive.       Assessment & Plan    1. Essential hypertension  Patient was counseled and encouraged to maintain a low sodium diet, as well as increasing physical activity.  Recommend random BP checks at home on a regular basis.  Repeat BP at end of visit was not necessary. Will continue medication at this time, and follow up in 3-6 months, or sooner if blood pressure begins to increase.     - diltiaZEM (CARTIA XT) 240 MG 24 hr capsule; Take 1 capsule (240 mg total) by mouth once daily.  Dispense: 90 capsule; Refill: 1    2. Obstructive sleep apnea on CPAP: setting = 8  Stable; no therapeutic changes at this time.   - CPAP/BIPAP SUPPLIES    3. Type 2 diabetes mellitus with stage 3 chronic kidney disease, without long-term current use of insulin  Diet controlled; patient does not take medication.    4. Idiopathic chronic gout of multiple sites with tophus  The current medical regimen is effective at this time and no changes to present plan or medications will be made at this visit.     - COLCRYS 0.6 mg tablet; TAKE 2 TABLETS BY MOUTH AT THE FIRST SIGN OF GOUT FLARE THEN 1 TABLET 1 HOUR LATER  Dispense: 30 tablet; Refill: 2    5. Screening for colon cancer  [facility]Kit was given to patient on 8/8/2018 11:03 AM   - Fecal Immunochemical Test (iFOBT); Future      Follow up documented    ACTIVE MEDICAL ISSUES:  Documented in Problem List    PAST MEDICAL HISTORY  Documented    PAST SURGICAL HISTORY:  Documented    SOCIAL HISTORY:  Documented    FAMILY HISTORY:  Documented    ALLERGIES  AND MEDICATIONS: updated and reviewed.  Documented    Health Maintenance       Date Due Completion Date    Zoster Vaccine 01/21/2012 ---    Fecal Occult Blood Test (FOBT)/[facility]Kit 03/16/2018 3/16/2017    Eye Exam 07/31/2018 7/31/2017    Override on 3/14/2017: Done    Override on 4/8/2016: Done (future)    Override on 8/22/2014: Done    Influenza Vaccine 08/01/2018 10/23/2017    Override on 9/23/2015: Done (today in OV)    Hemoglobin A1c 01/25/2019 7/25/2018    Override on 4/8/2016: Done (future)    Override on 1/4/2016: Done (future)    Override on 8/22/2014: Done    Foot Exam 01/30/2019 1/30/2018    Override on 7/20/2015: Done (today in OV)    Lipid Panel 07/25/2019 7/25/2018    Override on 8/22/2014: Done    High Dose Statin 08/08/2019 8/8/2018    TETANUS VACCINE 02/19/2026 2/19/2016

## 2018-08-08 NOTE — TELEPHONE ENCOUNTER
----- Message from Kevan Ortega sent at 8/8/2018  3:37 PM CDT -----  Contact: Self/[phone]  Patient called in stating at his appointment today he forgot to request cpap equipment. He would like someone to contact him regarding this matter.    Thank you

## 2018-08-08 NOTE — TELEPHONE ENCOUNTER
Spoke with patient requesting new orders for CPAP supplies , was told patient request will sent to PCP , patient verbalized understand .

## 2018-08-23 ENCOUNTER — CLINICAL SUPPORT (OUTPATIENT)
Dept: FAMILY MEDICINE | Facility: CLINIC | Age: 68
End: 2018-08-23
Payer: MEDICARE

## 2018-08-23 VITALS — SYSTOLIC BLOOD PRESSURE: 130 MMHG | DIASTOLIC BLOOD PRESSURE: 62 MMHG

## 2018-08-23 DIAGNOSIS — I10 ESSENTIAL HYPERTENSION: Primary | ICD-10-CM

## 2018-08-23 NOTE — PROGRESS NOTES
Bj Pate Jr. 68 y.o. male is here today for Blood Pressure check.   History of HTN yes.    Review of patient's allergies indicates:   Allergen Reactions    Tomato (solanum lycopersicum) Hives    Naproxen Hives    Shrimp Other (See Comments)     Creatinine   Date Value Ref Range Status   07/25/2018 1.9 (H) 0.5 - 1.4 mg/dL Final     Sodium   Date Value Ref Range Status   07/25/2018 135 (L) 136 - 145 mmol/L Final     Potassium   Date Value Ref Range Status   07/25/2018 4.5 3.5 - 5.1 mmol/L Final   ]  Patient verifies taking blood pressure medications on a regular basis at the same time of the day.     Current Outpatient Medications:     acetaminophen-codeine 300-30mg (TYLENOL #3) 300-30 mg Tab, , Disp: , Rfl:     albuterol 90 mcg/actuation inhaler, Inhale 2 puffs into the lungs every 6 (six) hours as needed for Wheezing., Disp: 1 Inhaler, Rfl: 11    aspirin 325 MG tablet, Take 1 tablet (325 mg total) by mouth once daily. (Patient taking differently: Take 81 mg by mouth once daily. ), Disp: , Rfl: 0    atorvastatin (LIPITOR) 40 MG tablet, TAKE ONE TABLET BY MOUTH ONCE DAILY, Disp: 90 tablet, Rfl: 0    blood pressure test kit-large Kit, 1 Device by Misc.(Non-Drug; Combo Route) route 2 (two) times daily., Disp: 1 each, Rfl: 0    clobetasol 0.05% (TEMOVATE) 0.05 % Oint, Apply topically 2 (two) times daily., Disp: 45 g, Rfl: 5    COLCRYS 0.6 mg tablet, TAKE 2 TABLETS BY MOUTH AT THE FIRST SIGN OF GOUT FLARE THEN 1 TABLET 1 HOUR LATER, Disp: 30 tablet, Rfl: 2    diclofenac sodium 1 % Gel, Apply 2 g topically once daily., Disp: 100 g, Rfl: 5    diltiaZEM (CARTIA XT) 240 MG 24 hr capsule, Take 1 capsule (240 mg total) by mouth once daily., Disp: 90 capsule, Rfl: 1    fluticasone-salmeterol 250-50 mcg/dose (ADVAIR DISKUS) 250-50 mcg/dose diskus inhaler, INHALE ONE PUFF INTO LUNGS TWICE DAILY, Disp: 60 each, Rfl: 2    FLUZONE HIGH-DOSE 2017-18, PF, 180 mcg/0.5 mL vaccine, ADM 0.5ML IM UTD, Disp: , Rfl: 0     gabapentin (NEURONTIN) 600 MG tablet, TAKE ONE TABLET BY MOUTH THREE TIMES DAILY, Disp: 90 tablet, Rfl: 2    levocetirizine (XYZAL) 5 MG tablet, TAKE ONE TABLET BY MOUTH ONCE DAILY IN THE EVENING, Disp: 30 tablet, Rfl: 11    lisinopril-hydrochlorothiazide (PRINZIDE,ZESTORETIC) 20-25 mg Tab, TAKE ONE TABLET BY MOUTH ONCE DAILY, Disp: 90 tablet, Rfl: 1  Does patient have record of home blood pressure readings no. Readings have been averaging not done.   Last dose of blood pressure medication was taken at this morning.  Patient is asymptomatic.   Complains of no complaints.    Vitals:    08/23/18 1537   BP: 130/62   BP Location: Right arm   Patient Position: Sitting   BP Method: Large (Manual)         Dr. Lombard informed of nurse visit. Pt states has not taken lisinopril-hctz in past week due to advice from Dr Lombard per LOV

## 2018-08-27 ENCOUNTER — TELEPHONE (OUTPATIENT)
Dept: FAMILY MEDICINE | Facility: CLINIC | Age: 68
End: 2018-08-27

## 2018-08-27 ENCOUNTER — LAB VISIT (OUTPATIENT)
Dept: LAB | Facility: HOSPITAL | Age: 68
End: 2018-08-27
Attending: FAMILY MEDICINE
Payer: MEDICARE

## 2018-08-27 DIAGNOSIS — Z12.11 SCREENING FOR COLON CANCER: ICD-10-CM

## 2018-08-27 LAB — HEMOCCULT STL QL IA: NEGATIVE

## 2018-08-27 PROCEDURE — 82274 ASSAY TEST FOR BLOOD FECAL: CPT

## 2018-08-27 NOTE — TELEPHONE ENCOUNTER
Spoke to patient. States that he just completed the Fit Kit at the end of last week. He mailed it in on Thursday or Friday not sure of which day.

## 2018-08-27 NOTE — TELEPHONE ENCOUNTER
----- Message from Harleen Mc sent at 8/27/2018  1:53 PM CDT -----  Contact: self  Pt returned Donna's call regarding a letter he received about his fit kit. Please contact him at [phone].    Thanks

## 2018-08-29 ENCOUNTER — TELEPHONE (OUTPATIENT)
Dept: FAMILY MEDICINE | Facility: CLINIC | Age: 68
End: 2018-08-29

## 2018-08-29 NOTE — TELEPHONE ENCOUNTER
----- Message from Azikiwe K. Lombard, MD sent at 8/28/2018 10:35 PM CDT -----  Please notify patient of normal  results by phone, and schedule follow up  as needed..

## 2018-08-30 DIAGNOSIS — M1A.09X1 IDIOPATHIC CHRONIC GOUT OF MULTIPLE SITES WITH TOPHUS: ICD-10-CM

## 2018-08-30 RX ORDER — COLCHICINE 0.6 MG/1
TABLET, FILM COATED ORAL
Qty: 30 TABLET | Refills: 2 | Status: SHIPPED | OUTPATIENT
Start: 2018-08-30 | End: 2018-12-17 | Stop reason: SDUPTHER

## 2018-09-21 DIAGNOSIS — N18.9 CHRONIC KIDNEY DISEASE, UNSPECIFIED CKD STAGE: ICD-10-CM

## 2018-10-01 DIAGNOSIS — E11.42 DIABETIC POLYNEUROPATHY ASSOCIATED WITH TYPE 2 DIABETES MELLITUS: ICD-10-CM

## 2018-10-01 RX ORDER — GABAPENTIN 600 MG/1
TABLET ORAL
Qty: 90 TABLET | Refills: 2 | Status: SHIPPED | OUTPATIENT
Start: 2018-10-01 | End: 2019-01-08 | Stop reason: SDUPTHER

## 2018-10-15 ENCOUNTER — OFFICE VISIT (OUTPATIENT)
Dept: FAMILY MEDICINE | Facility: CLINIC | Age: 68
End: 2018-10-15
Payer: MEDICARE

## 2018-10-15 VITALS
HEIGHT: 69 IN | WEIGHT: 315 LBS | HEART RATE: 96 BPM | RESPIRATION RATE: 20 BRPM | TEMPERATURE: 98 F | DIASTOLIC BLOOD PRESSURE: 66 MMHG | SYSTOLIC BLOOD PRESSURE: 130 MMHG | BODY MASS INDEX: 46.65 KG/M2 | OXYGEN SATURATION: 97 %

## 2018-10-15 DIAGNOSIS — J02.8 ACUTE BACTERIAL PHARYNGITIS: ICD-10-CM

## 2018-10-15 DIAGNOSIS — B96.89 ACUTE BACTERIAL PHARYNGITIS: ICD-10-CM

## 2018-10-15 DIAGNOSIS — N18.30 TYPE 2 DIABETES MELLITUS WITH STAGE 3 CHRONIC KIDNEY DISEASE, WITHOUT LONG-TERM CURRENT USE OF INSULIN: Primary | ICD-10-CM

## 2018-10-15 DIAGNOSIS — E11.22 TYPE 2 DIABETES MELLITUS WITH STAGE 3 CHRONIC KIDNEY DISEASE, WITHOUT LONG-TERM CURRENT USE OF INSULIN: Primary | ICD-10-CM

## 2018-10-15 PROCEDURE — 3044F HG A1C LEVEL LT 7.0%: CPT | Mod: CPTII,,, | Performed by: FAMILY MEDICINE

## 2018-10-15 PROCEDURE — 1101F PT FALLS ASSESS-DOCD LE1/YR: CPT | Mod: CPTII,,, | Performed by: FAMILY MEDICINE

## 2018-10-15 PROCEDURE — 3075F SYST BP GE 130 - 139MM HG: CPT | Mod: CPTII,,, | Performed by: FAMILY MEDICINE

## 2018-10-15 PROCEDURE — 99214 OFFICE O/P EST MOD 30 MIN: CPT | Mod: S$PBB,,, | Performed by: FAMILY MEDICINE

## 2018-10-15 PROCEDURE — 99999 PR PBB SHADOW E&M-EST. PATIENT-LVL III: CPT | Mod: PBBFAC,,, | Performed by: FAMILY MEDICINE

## 2018-10-15 PROCEDURE — 96372 THER/PROPH/DIAG INJ SC/IM: CPT | Mod: PBBFAC,PO

## 2018-10-15 PROCEDURE — 3078F DIAST BP <80 MM HG: CPT | Mod: CPTII,,, | Performed by: FAMILY MEDICINE

## 2018-10-15 PROCEDURE — 99213 OFFICE O/P EST LOW 20 MIN: CPT | Mod: PBBFAC,PO | Performed by: FAMILY MEDICINE

## 2018-10-15 RX ORDER — PNEUMOCOCCAL 13-VALENT CONJUGATE VACCINE 2.2; 2.2; 2.2; 2.2; 2.2; 4.4; 2.2; 2.2; 2.2; 2.2; 2.2; 2.2; 2.2 UG/.5ML; UG/.5ML; UG/.5ML; UG/.5ML; UG/.5ML; UG/.5ML; UG/.5ML; UG/.5ML; UG/.5ML; UG/.5ML; UG/.5ML; UG/.5ML; UG/.5ML
INJECTION, SUSPENSION INTRAMUSCULAR
Refills: 0 | COMMUNITY
Start: 2018-10-10 | End: 2018-12-17

## 2018-10-15 RX ORDER — METHYLPREDNISOLONE SOD SUCC 125 MG
125 VIAL (EA) INJECTION
Status: COMPLETED | OUTPATIENT
Start: 2018-10-15 | End: 2018-10-15

## 2018-10-15 RX ORDER — AZITHROMYCIN 250 MG/1
TABLET, FILM COATED ORAL
Qty: 6 TABLET | Refills: 0 | Status: SHIPPED | OUTPATIENT
Start: 2018-10-15 | End: 2018-12-17

## 2018-10-15 RX ORDER — FLUTICASONE PROPIONATE 50 MCG
1 SPRAY, SUSPENSION (ML) NASAL DAILY
Qty: 16 G | Refills: 12 | Status: SHIPPED | OUTPATIENT
Start: 2018-10-15 | End: 2018-11-14

## 2018-10-15 RX ADMIN — METHYLPREDNISOLONE SODIUM SUCCINATE 125 MG: 125 INJECTION, POWDER, FOR SOLUTION INTRAMUSCULAR; INTRAVENOUS at 04:10

## 2018-10-15 NOTE — PROGRESS NOTES
Patient given solumedrol 125 mg injection left ventrogluteal, tolerated well, no complaints, no reaction noted

## 2018-10-15 NOTE — PROGRESS NOTES
Chief Complaint   Patient presents with    Cough     past 4 days        HPI  Bj Pate Jr. is a 68 y.o. male with multiple medical diagnoses as listed in the medical history and problem list that presents for URI.    URI - +PND, +cough prod, no sore throat, +subj fever, +facial pressure, +prog worsening. +fatigue.     Pt is known to me and was last seen by me on Visit date not found.    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        PAST SURGICAL HISTORY:  Past Surgical History:   Procedure Laterality Date    NO PAST SURGERIES         SOCIAL HISTORY:  Social History     Socioeconomic History    Marital status:      Spouse name: Not on file    Number of children: 3    Years of education: Not on file    Highest education level: Not on file   Social Needs    Financial resource strain: Not on file    Food insecurity - worry: Not on file    Food insecurity - inability: Not on file    Transportation needs - medical: Not on file    Transportation needs - non-medical: Not on file   Occupational History    Occupation:    Tobacco Use    Smoking status: Never Smoker    Smokeless tobacco: Never Used   Substance and Sexual Activity    Alcohol use: Yes     Alcohol/week: 0.0 oz     Comment: occacionally    Drug use: No    Sexual activity: Not on file   Other Topics Concern    Not on file   Social History  Narrative    Not on file       FAMILY HISTORY:  Family History   Problem Relation Age of Onset    No Known Problems Mother     No Known Problems Father     Hypertension Unknown     Diabetes Unknown     Diabetes Paternal Grandfather     Heart disease Paternal Grandfather     No Known Problems Sister     No Known Problems Brother     No Known Problems Maternal Aunt     No Known Problems Maternal Uncle     No Known Problems Paternal Aunt     No Known Problems Paternal Uncle     No Known Problems Maternal Grandmother     No Known Problems Maternal Grandfather     No Known Problems Paternal Grandmother     Amblyopia Neg Hx     Blindness Neg Hx     Cancer Neg Hx     Cataracts Neg Hx     Glaucoma Neg Hx     Macular degeneration Neg Hx     Retinal detachment Neg Hx     Strabismus Neg Hx     Stroke Neg Hx     Thyroid disease Neg Hx        ALLERGIES AND MEDICATIONS: updated and reviewed.  Review of patient's allergies indicates:   Allergen Reactions    Tomato (solanum lycopersicum) Hives    Naproxen Hives    Shrimp Other (See Comments)     Current Outpatient Medications   Medication Sig Dispense Refill    acetaminophen-codeine 300-30mg (TYLENOL #3) 300-30 mg Tab as needed.       aspirin 325 MG tablet Take 1 tablet (325 mg total) by mouth once daily. (Patient taking differently: Take 81 mg by mouth once daily. )  0    atorvastatin (LIPITOR) 40 MG tablet TAKE ONE TABLET BY MOUTH ONCE DAILY 90 tablet 0    COLCRYS 0.6 mg tablet TAKE 2 TABLETS BY MOUTH AT THE FIRST SIGN OF GOUT FLARE THEN 1 TABLET 1 HOUR LATER 30 tablet 2    COLCRYS 0.6 mg tablet TAKE 2 TABLETS BY MOUTH AT THE FIRST SIGN OF GOUT FLARE THEN 1 TABLET 1 HOUR LATER 30 tablet 2    diclofenac sodium 1 % Gel Apply 2 g topically once daily. 100 g 5    diltiaZEM (CARTIA XT) 240 MG 24 hr capsule Take 1 capsule (240 mg total) by mouth once daily. 90 capsule 1    FLUAD [id number], 65 YR UP,,PF, 45 mcg (15 mcg x 3)/0.5 mL Syrg ADM 0.5ML IM  UTD  0    fluticasone-salmeterol 250-50 mcg/dose (ADVAIR DISKUS) 250-50 mcg/dose diskus inhaler INHALE ONE PUFF INTO LUNGS TWICE DAILY 60 each 2    FLUZONE HIGH-DOSE 2017-18, PF, 180 mcg/0.5 mL vaccine ADM 0.5ML IM UTD  0    gabapentin (NEURONTIN) 600 MG tablet TAKE 1 TABLET BY MOUTH THREE TIMES DAILY 90 tablet 2    levocetirizine (XYZAL) 5 MG tablet TAKE ONE TABLET BY MOUTH ONCE DAILY IN THE EVENING 30 tablet 11    lisinopril-hydrochlorothiazide (PRINZIDE,ZESTORETIC) 20-25 mg Tab TAKE ONE TABLET BY MOUTH ONCE DAILY 90 tablet 1    PREVNAR 13, PF, 0.5 mL Syrg ADM 0.5ML IM UTD  0    albuterol 90 mcg/actuation inhaler Inhale 2 puffs into the lungs every 6 (six) hours as needed for Wheezing. 1 Inhaler 11    azithromycin (ZITHROMAX Z-MED) 250 MG tablet 2 tabs today, then 1 tab per day for 4 days. 6 tablet 0    blood pressure test kit-large Kit 1 Device by Misc.(Non-Drug; Combo Route) route 2 (two) times daily. 1 each 0    clobetasol 0.05% (TEMOVATE) 0.05 % Oint Apply topically 2 (two) times daily. 45 g 5    fluticasone (FLONASE) 50 mcg/actuation nasal spray 1 spray (50 mcg total) by Each Nare route once daily. 16 g 12     No current facility-administered medications for this visit.        ROS  Review of Systems   Constitutional: Negative for activity change, appetite change, fatigue and fever.   HENT: Positive for postnasal drip, sinus pressure and sneezing. Negative for congestion, rhinorrhea and sore throat.    Eyes: Positive for itching. Negative for visual disturbance.   Respiratory: Negative for cough, chest tightness, shortness of breath and wheezing.    Cardiovascular: Negative for chest pain.   Gastrointestinal: Negative for abdominal pain, diarrhea, nausea and vomiting.   Endocrine: Negative for polydipsia.   Genitourinary: Negative for dysuria, frequency and urgency.   Musculoskeletal: Negative for back pain, myalgias and neck stiffness.   Skin: Negative for rash.   Neurological: Negative for  "dizziness, syncope and numbness.   Psychiatric/Behavioral: Negative for agitation and dysphoric mood.       Physical Exam  Vitals:    10/15/18 1559   BP: 130/66   Pulse: 96   Resp: 20   Temp: 98.2 °F (36.8 °C)    Body mass index is 46.82 kg/m².  Weight: (!) 143.8 kg (317 lb 0.3 oz)   Height: 5' 9" (175.3 cm)     Physical Exam   Constitutional: He is oriented to person, place, and time. He appears well-developed and well-nourished.   HENT:   Head: Normocephalic.   Mouth/Throat: No oropharyngeal exudate.   Erythematous pharynx  Erythematous, edematous nares  Mild dull TMs bilaterally   Eyes: EOM are normal. Pupils are equal, round, and reactive to light. No scleral icterus.   Neck: Normal range of motion. Neck supple.   Cardiovascular: Normal rate, regular rhythm and normal heart sounds.   Pulmonary/Chest: Effort normal and breath sounds normal. No respiratory distress.   Abdominal: Soft. Bowel sounds are normal. There is no tenderness.   Lymphadenopathy:     He has cervical adenopathy.   Neurological: He is alert and oriented to person, place, and time.   Skin: Skin is warm and dry. No rash noted.   Psychiatric: He has a normal mood and affect. His behavior is normal. Judgment and thought content normal.   Nursing note and vitals reviewed.      Health Maintenance       Date Due Completion Date    Zoster Vaccine 01/21/2012 ---    Eye Exam 07/31/2018 7/31/2017    Override on 3/14/2017: Done    Override on 4/8/2016: Done (future)    Override on 8/22/2014: Done    Influenza Vaccine 08/01/2018 10/23/2017    Override on 9/23/2015: Done (today in OV)    Hemoglobin A1c 01/25/2019 7/25/2018    Override on 4/8/2016: Done (future)    Override on 1/4/2016: Done (future)    Override on 8/22/2014: Done    Foot Exam 01/30/2019 1/30/2018    Override on 7/20/2015: Done (today in OV)    Lipid Panel 07/25/2019 7/25/2018    Override on 8/22/2014: Done    Low Dose Statin 08/08/2019 8/8/2018    Fecal Occult Blood Test (FOBT)/FitKit " 08/27/2019 8/27/2018    TETANUS VACCINE 02/19/2026 2/19/2016          Assessment & Plan    Type 2 diabetes mellitus with stage 3 chronic kidney disease, without long-term current use of insulin  -     Ambulatory referral to Ophthalmology  -     Diabetic Eye Screening Photo; Future    Acute bacterial pharyngitis  -     methylPREDNISolone sodium succinate injection 125 mg  -     azithromycin (ZITHROMAX Z-MED) 250 MG tablet; 2 tabs today, then 1 tab per day for 4 days.  Dispense: 6 tablet; Refill: 0  -     fluticasone (FLONASE) 50 mcg/actuation nasal spray; 1 spray (50 mcg total) by Each Nare route once daily.  Dispense: 16 g; Refill: 12  - Will treat at this time        Follow-up in about 4 weeks (around 11/12/2018), or if symptoms worsen or fail to improve.

## 2018-11-02 DIAGNOSIS — E78.5 DYSLIPIDEMIA: ICD-10-CM

## 2018-11-02 RX ORDER — ATORVASTATIN CALCIUM 40 MG/1
TABLET, FILM COATED ORAL
Qty: 90 TABLET | Refills: 0 | Status: SHIPPED | OUTPATIENT
Start: 2018-11-02 | End: 2019-02-16 | Stop reason: SDUPTHER

## 2018-11-26 ENCOUNTER — TELEPHONE (OUTPATIENT)
Dept: FAMILY MEDICINE | Facility: CLINIC | Age: 68
End: 2018-11-26

## 2018-11-26 DIAGNOSIS — J44.9 COPD, MODERATE: ICD-10-CM

## 2018-11-26 RX ORDER — FLUTICASONE PROPIONATE AND SALMETEROL 50; 250 UG/1; UG/1
POWDER RESPIRATORY (INHALATION)
Qty: 60 EACH | Refills: 2 | Status: SHIPPED | OUTPATIENT
Start: 2018-11-26 | End: 2019-03-23 | Stop reason: SDUPTHER

## 2018-11-26 NOTE — TELEPHONE ENCOUNTER
Patient called because listed medication is too expensive with the insurance and would rather have the liquid generic form.

## 2018-11-26 NOTE — TELEPHONE ENCOUNTER
----- Message from Margarita Walker sent at 11/26/2018  4:39 PM CST -----  Contact: Self  Patient called because listed medication is too expensive with the insurance and would rather have the liquid generic form. Please call to advise at [phone] or [phone]          ADVAIR DISKUS 250-50 mcg/dose diskus inhaler          [facility] - [place], [place] - [address]

## 2018-11-30 ENCOUNTER — NURSE TRIAGE (OUTPATIENT)
Dept: ADMINISTRATIVE | Facility: CLINIC | Age: 68
End: 2018-11-30

## 2018-11-30 NOTE — TELEPHONE ENCOUNTER
"    Reason for Disposition   Numbness or tingling in one or both hands is a chronic symptom (recurrent or ongoing problem lasting > 4 weeks)    Protocols used:  NEUROLOGIC [facility]    Bj has had recurrent numbness in fingers "for a very long time", and it "comes and goes".  Today he called to request appt with Azikiwe K. Lombard, MD, but he declined appt today or Monday, Tuesday with anyone else, only wants to see Dr Lombard and works night shift so wants to see him on Wednesday.  Explained he should and could be seen sooner, but he does decline. Appt made Wednesday, 12/05.  Encouraged him to call back for any CP, shoulder or arm pain, SOB, weakness, or other symptoms.  He states this is not new, he has no weakness, does not affect the use or coordination of his upper body / extremities, he would just like to be seen again.  He states Dr Lombard is familiar with this concern.  Care advice given, and he will call back for any worsening.  Message to Dr Lombard.  Please contact caller directly with any additional care advice.    "

## 2018-12-05 ENCOUNTER — OFFICE VISIT (OUTPATIENT)
Dept: FAMILY MEDICINE | Facility: CLINIC | Age: 68
End: 2018-12-05
Payer: MEDICARE

## 2018-12-05 VITALS
RESPIRATION RATE: 17 BRPM | HEIGHT: 69 IN | OXYGEN SATURATION: 98 % | BODY MASS INDEX: 46.65 KG/M2 | TEMPERATURE: 98 F | HEART RATE: 70 BPM | WEIGHT: 315 LBS | SYSTOLIC BLOOD PRESSURE: 124 MMHG | DIASTOLIC BLOOD PRESSURE: 80 MMHG

## 2018-12-05 DIAGNOSIS — G56.91 UPPER EXTREMITY NEUROPATHY, RIGHT: ICD-10-CM

## 2018-12-05 DIAGNOSIS — G56.92 UPPER EXTREMITY NEUROPATHY, LEFT: Primary | ICD-10-CM

## 2018-12-05 PROCEDURE — 99213 OFFICE O/P EST LOW 20 MIN: CPT | Mod: S$GLB,,, | Performed by: FAMILY MEDICINE

## 2018-12-05 PROCEDURE — 3074F SYST BP LT 130 MM HG: CPT | Mod: CPTII,S$GLB,, | Performed by: FAMILY MEDICINE

## 2018-12-05 PROCEDURE — 99999 PR PBB SHADOW E&M-EST. PATIENT-LVL III: CPT | Mod: PBBFAC,,, | Performed by: FAMILY MEDICINE

## 2018-12-05 PROCEDURE — 3079F DIAST BP 80-89 MM HG: CPT | Mod: CPTII,S$GLB,, | Performed by: FAMILY MEDICINE

## 2018-12-05 PROCEDURE — 1101F PT FALLS ASSESS-DOCD LE1/YR: CPT | Mod: CPTII,S$GLB,, | Performed by: FAMILY MEDICINE

## 2018-12-05 RX ORDER — AMOXICILLIN 500 MG/1
TABLET, FILM COATED ORAL
Refills: 0 | COMMUNITY
Start: 2018-12-03 | End: 2018-12-17

## 2018-12-05 NOTE — PROGRESS NOTES
"Chief Complaint   Patient presents with    Numbness     both hands        Bj Pate Jr. is a 68 y.o. male who presents per the Chief Complaint.  Pt is known to me and was last seen by me on 8/8/2018.  All known chronic medical issues have been documented.       HPI     ROS  Review of Systems   Constitutional: Negative.  Negative for activity change, appetite change, chills, diaphoresis, fatigue, fever and unexpected weight change.   HENT: Negative.  Negative for congestion, ear pain, hearing loss, nosebleeds, postnasal drip, rhinorrhea, sinus pressure, sneezing, sore throat and trouble swallowing.    Eyes: Negative for pain and visual disturbance.   Respiratory: Negative for cough, choking and shortness of breath.    Cardiovascular: Negative for chest pain and leg swelling.   Gastrointestinal: Negative for abdominal pain, constipation, diarrhea, nausea and vomiting.   Genitourinary: Negative for difficulty urinating, dysuria, frequency and urgency.   Musculoskeletal: Positive for myalgias (left arm pain in certain positions), neck pain and neck stiffness. Negative for arthralgias, back pain, gait problem and joint swelling.   Skin: Negative.    Allergic/Immunologic: Negative for environmental allergies and food allergies.   Neurological: Positive for numbness (tips of fingers on both hands). Negative for dizziness, seizures, syncope, weakness, light-headedness and headaches.   Psychiatric/Behavioral: Negative.  Negative for confusion, decreased concentration, dysphoric mood and sleep disturbance. The patient is not nervous/anxious.        Physical Exam  Vitals:    12/05/18 1609   BP: 124/80   Pulse: 70   Resp: 17   Temp: 97.8 °F (36.6 °C)    Body mass index is 47.4 kg/m².  Weight: (!) 145.6 kg (320 lb 15.8 oz)   Height: 5' 9" (175.3 cm)     Physical Exam   Constitutional: He is oriented to person, place, and time. He appears well-developed and well-nourished. He is active and cooperative.  Non-toxic appearance. " He does not have a sickly appearance. He does not appear ill. No distress.   HENT:   Head: Normocephalic and atraumatic.   Right Ear: Hearing and external ear normal. No decreased hearing is noted.   Left Ear: Hearing and external ear normal. No decreased hearing is noted.   Nose: Nose normal. No rhinorrhea or nasal deformity.   Mouth/Throat: Uvula is midline and oropharynx is clear and moist. He does not have dentures. Normal dentition.   Eyes: Conjunctivae, EOM and lids are normal. Pupils are equal, round, and reactive to light. Right eye exhibits no chemosis, no discharge and no exudate. No foreign body present in the right eye. Left eye exhibits no chemosis, no discharge and no exudate. No foreign body present in the left eye. No scleral icterus.   Neck: Normal range of motion and full passive range of motion without pain. Neck supple.   Cardiovascular: Normal rate, regular rhythm, S1 normal, S2 normal and normal heart sounds. Exam reveals no gallop and no friction rub.   No murmur heard.  Pulmonary/Chest: Effort normal and breath sounds normal. No accessory muscle usage. No respiratory distress. He has no decreased breath sounds. He has no wheezes. He has no rhonchi. He has no rales.   Abdominal: Soft. Normal appearance. He exhibits no distension. There is no hepatosplenomegaly. There is no tenderness. There is no rigidity, no rebound and no guarding.   Musculoskeletal: Normal range of motion.        Cervical back: He exhibits tenderness and pain. He exhibits normal range of motion, no bony tenderness, no swelling, no edema, no deformity, no laceration, no spasm and normal pulse.        Right hand: He exhibits normal range of motion, no tenderness, no bony tenderness, normal two-point discrimination, normal capillary refill, no deformity, no laceration and no swelling. Decreased sensation noted. Normal strength noted.        Left hand: He exhibits normal range of motion, no tenderness, no bony tenderness,  normal two-point discrimination, normal capillary refill, no deformity, no laceration and no swelling. Decreased sensation noted. Normal strength noted.        Hands:  Fingertips with decreased sensation; capillary refill appears normal.   Neurological: He is alert and oriented to person, place, and time. He has normal strength. No cranial nerve deficit or sensory deficit. He exhibits normal muscle tone. He displays no seizure activity. Coordination and gait normal.   Skin: Skin is warm, dry and intact. No rash noted. He is not diaphoretic.   Psychiatric: He has a normal mood and affect. His speech is normal and behavior is normal. Judgment and thought content normal. Cognition and memory are normal. He is attentive.       Assessment & Plan    1. Upper extremity neuropathy, left  May be associated with previous neck injury several years ago and onset of arthritis.  Referral to appropriate specialist for evaluation and management placed in [facility].   - Ambulatory referral to Neurology    2. Upper extremity neuropathy, right  May be associated with previous neck injury several years ago and onset of arthritis.  Referral to appropriate specialist for evaluation and management placed in [facility].   - Ambulatory referral to Neurology      Follow up documented    ACTIVE MEDICAL ISSUES:  Documented in Problem List    PAST MEDICAL HISTORY  Documented    PAST SURGICAL HISTORY:  Documented    SOCIAL HISTORY:  Documented    FAMILY HISTORY:  Documented    ALLERGIES AND MEDICATIONS: updated and reviewed.  Documented    Health Maintenance       Date Due Completion Date    Zoster Vaccine 01/21/2012 ---    Eye Exam 07/31/2018 7/31/2017    Override on 3/14/2017: Done    Override on 4/8/2016: Done (future)    Override on 8/22/2014: Done    Influenza Vaccine 08/01/2018 10/23/2017    Override on 9/23/2015: Done (today in OV)    Foot Exam 01/30/2019 1/30/2018    Override on 7/20/2015: Done (today in OV)    Hemoglobin A1c 01/25/2019 7/25/2018     Override on 4/8/2016: Done (future)    Override on 1/4/2016: Done (future)    Override on 8/22/2014: Done    Lipid Panel 07/25/2019 7/25/2018    Override on 8/22/2014: Done    Fecal Occult Blood Test (FOBT)/FitKit 08/27/2019 8/27/2018    Low Dose Statin 11/02/2019 11/2/2018    TETANUS VACCINE 02/19/2026 2/19/2016

## 2018-12-17 ENCOUNTER — OFFICE VISIT (OUTPATIENT)
Dept: NEUROLOGY | Facility: CLINIC | Age: 68
End: 2018-12-17
Payer: MEDICARE

## 2018-12-17 VITALS
HEART RATE: 86 BPM | DIASTOLIC BLOOD PRESSURE: 88 MMHG | SYSTOLIC BLOOD PRESSURE: 156 MMHG | WEIGHT: 315 LBS | BODY MASS INDEX: 46.65 KG/M2 | HEIGHT: 69 IN

## 2018-12-17 DIAGNOSIS — G56.03 BILATERAL CARPAL TUNNEL SYNDROME: Primary | ICD-10-CM

## 2018-12-17 PROCEDURE — 3079F DIAST BP 80-89 MM HG: CPT | Mod: CPTII,S$GLB,, | Performed by: NEUROLOGICAL SURGERY

## 2018-12-17 PROCEDURE — 3077F SYST BP >= 140 MM HG: CPT | Mod: CPTII,S$GLB,, | Performed by: NEUROLOGICAL SURGERY

## 2018-12-17 PROCEDURE — 99215 OFFICE O/P EST HI 40 MIN: CPT | Mod: S$GLB,,, | Performed by: NEUROLOGICAL SURGERY

## 2018-12-17 PROCEDURE — 99999 PR PBB SHADOW E&M-EST. PATIENT-LVL III: CPT | Mod: PBBFAC,,, | Performed by: NEUROLOGICAL SURGERY

## 2018-12-17 PROCEDURE — 1101F PT FALLS ASSESS-DOCD LE1/YR: CPT | Mod: CPTII,S$GLB,, | Performed by: NEUROLOGICAL SURGERY

## 2018-12-17 NOTE — PROGRESS NOTES
Chief Complaint   Patient presents with    Peripheral Neuropathy         Bj Pate Jr. is a 68 y.o. right handed male referred by Dr. Lombard for evaluation of bilateral numbness, tingling, weakness. This is evaluated as a personal injury. The onset of symptoms was gradual, starting about several years ago The pain is described as numbing, shooting and tingling.   Symptoms occur continuously and last several minutes.  He has had night time symptoms.  Restricted activities include: repetitive use pattern, limited  activities. He has not had Physical Therapy for these symptoms.  The pain is relieved by rest. His work is retired and he has not missed work. He is a retired  and worked for years on the road. He had began to notice symptoms only at night, but has gradually began to notice that the symptoms are coming more frequently during the day and when he has been at rest as well. holding his arm in any one position has also triggered his symptoms.     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        PAST SURGICAL HISTORY:  Past Surgical History:   Procedure Laterality Date    NO PAST SURGERIES         SOCIAL HISTORY:  Social History     Socioeconomic History    Marital status:      Spouse name: Not on file    Number of children: 3    Years of education: Not on file    Highest education level: Not on  file   Social Needs    Financial resource strain: Not on file    Food insecurity - worry: Not on file    Food insecurity - inability: Not on file    Transportation needs - medical: Not on file    Transportation needs - non-medical: Not on file   Occupational History    Occupation:    Tobacco Use    Smoking status: Never Smoker    Smokeless tobacco: Never Used   Substance and Sexual Activity    Alcohol use: Yes     Alcohol/week: 0.0 oz     Comment: occacionally    Drug use: No    Sexual activity: Not on file   Other Topics Concern    Not on file   Social History Narrative    Not on file       FAMILY HISTORY:  Family History   Problem Relation Age of Onset    No Known Problems Mother     No Known Problems Father     Hypertension Unknown     Diabetes Unknown     Diabetes Paternal Grandfather     Heart disease Paternal Grandfather     No Known Problems Sister     No Known Problems Brother     No Known Problems Maternal Aunt     No Known Problems Maternal Uncle     No Known Problems Paternal Aunt     No Known Problems Paternal Uncle     No Known Problems Maternal Grandmother     No Known Problems Maternal Grandfather     No Known Problems Paternal Grandmother     Amblyopia Neg Hx     Blindness Neg Hx     Cancer Neg Hx     Cataracts Neg Hx     Glaucoma Neg Hx     Macular degeneration Neg Hx     Retinal detachment Neg Hx     Strabismus Neg Hx     Stroke Neg Hx     Thyroid disease Neg Hx        ALLERGIES AND MEDICATIONS: updated and reviewed.  Review of patient's allergies indicates:   Allergen Reactions    Tomato (solanum lycopersicum) Hives    Naproxen Hives    Shrimp Other (See Comments)     Current Outpatient Medications   Medication Sig Dispense Refill    acetaminophen-codeine 300-30mg (TYLENOL #3) 300-30 mg Tab as needed.       ADVAIR DISKUS 250-50 mcg/dose diskus inhaler INHALE ONE PUFF BY MOUTH TWICE DAILY 60 each 2    albuterol 90 mcg/actuation inhaler Inhale  2 puffs into the lungs every 6 (six) hours as needed for Wheezing. 1 Inhaler 11    aspirin 325 MG tablet Take 1 tablet (325 mg total) by mouth once daily. (Patient taking differently: Take 81 mg by mouth once daily. )  0    atorvastatin (LIPITOR) 40 MG tablet TAKE 1 TABLET BY MOUTH ONCE DAILY 90 tablet 0    blood pressure test kit-large Kit 1 Device by Misc.(Non-Drug; Combo Route) route 2 (two) times daily. 1 each 0    clobetasol 0.05% (TEMOVATE) 0.05 % Oint Apply topically 2 (two) times daily. 45 g 5    COLCRYS 0.6 mg tablet TAKE 2 TABLETS BY MOUTH AT THE FIRST SIGN OF GOUT FLARE THEN 1 TABLET 1 HOUR LATER 30 tablet 2    diclofenac sodium 1 % Gel Apply 2 g topically once daily. 100 g 5    diltiaZEM (CARTIA XT) 240 MG 24 hr capsule Take 1 capsule (240 mg total) by mouth once daily. 90 capsule 1    FLUAD 8826-0112, 65 YR UP,,PF, 45 mcg (15 mcg x 3)/0.5 mL Syrg ADM 0.5ML IM UTD  0    fluticasone-salmeterol 250-50 mcg/dose (ADVAIR DISKUS) 250-50 mcg/dose diskus inhaler INHALE ONE PUFF INTO LUNGS TWICE DAILY 60 each 2    FLUZONE HIGH-DOSE 2017-18, PF, 180 mcg/0.5 mL vaccine ADM 0.5ML IM UTD  0    gabapentin (NEURONTIN) 600 MG tablet TAKE 1 TABLET BY MOUTH THREE TIMES DAILY 90 tablet 2    levocetirizine (XYZAL) 5 MG tablet TAKE ONE TABLET BY MOUTH ONCE DAILY IN THE EVENING 30 tablet 11    lisinopril-hydrochlorothiazide (PRINZIDE,ZESTORETIC) 20-25 mg Tab TAKE ONE TABLET BY MOUTH ONCE DAILY 90 tablet 1     No current facility-administered medications for this visit.        Review of Systems   Constitutional: Negative for activity change, fatigue and unexpected weight change.   HENT: Negative for trouble swallowing and voice change.    Eyes: Negative for photophobia, pain and visual disturbance.   Respiratory: Negative for apnea and shortness of breath.    Cardiovascular: Negative for chest pain and palpitations.   Gastrointestinal: Negative for constipation, nausea and vomiting.   Genitourinary: Negative for  difficulty urinating.   Musculoskeletal: Negative for arthralgias, back pain, gait problem, myalgias and neck pain.   Skin: Negative for color change and rash.   Neurological: Positive for weakness and numbness. Negative for dizziness, seizures, syncope, speech difficulty, light-headedness and headaches.   Psychiatric/Behavioral: Negative for agitation, behavioral problems and confusion.       Neurologic Exam     Mental Status   Oriented to person, place, and time.   Registration: recalls 3 of 3 objects.   Attention: normal. Concentration: normal.   Speech: speech is normal   Level of consciousness: alert  Knowledge: good.     Cranial Nerves     CN II   Visual fields full to confrontation.   Right visual field deficit: none  Left visual field deficit: none     CN III, IV, VI   Pupils are equal, round, and reactive to light.  Extraocular motions are normal.   Right pupil: Size: 3 mm. Shape: regular. Accommodation: intact.   Left pupil: Size: 3 mm. Shape: regular. Accommodation: intact.   CN III: no CN III palsy  CN VI: no CN VI palsy  Nystagmus: none   Diplopia: none  Ophthalmoparesis: none  Upgaze: normal  Downgaze: normal  Conjugate gaze: present    CN V   Facial sensation intact.   Right facial sensation deficit: none  Left facial sensation deficit: none    CN VII   Facial expression full, symmetric.   Right facial weakness: none  Left facial weakness: none    CN VIII   CN VIII normal.     CN IX, X   CN IX normal.   CN X normal.   Palate: symmetric    CN XI   CN XI normal.   Right sternocleidomastoid strength: normal  Left sternocleidomastoid strength: normal  Right trapezius strength: normal  Left trapezius strength: normal    CN XII   CN XII normal.   Tongue deviation: none    Motor Exam   Muscle bulk: normal  Overall muscle tone: normal  Right arm tone: normal  Left arm tone: normal  Right leg tone: normal  Left leg tone: normal    Strength   Strength 5/5 except as noted.   Right interossei: 4/5  Left  interossei: 4/5    Sensory Exam   Right arm light touch: decreased from fingers  Left arm light touch: decreased from fingers  Right leg light touch: normal  Left leg light touch: normal  Right arm vibration: decreased from fingers  Left arm vibration: decreased from fingers  Right leg vibration: normal  Left leg vibration: normal  Right arm proprioception: normal  Left arm proprioception: normal  Right leg proprioception: normal  Left leg proprioception: normal  Right arm pinprick: decreased from fingers  Left arm pinprick: decreased from fingers  Right leg pinprick: normal  Left leg pinprick: normal  Sensory deficit distribution on right: median  Sensory deficit distribution on left: median    Gait, Coordination, and Reflexes     Gait  Gait: normal    Coordination   Romberg: negative  Finger to nose coordination: normal  Heel to shin coordination: normal  Tandem walking coordination: normal    Tremor   Resting tremor: absent    Reflexes   Right brachioradialis: 2+  Left brachioradialis: 2+  Right biceps: 2+  Left biceps: 2+  Right triceps: 2+  Left triceps: 2+  Right patellar: 2+  Left patellar: 2+  Right achilles: 2+  Left achilles: 2+  Right plantar: normal  Left plantar: normal      Physical Exam   Constitutional: He is oriented to person, place, and time. He appears well-developed and well-nourished.   HENT:   Head: Normocephalic and atraumatic.   Eyes: EOM are normal. Pupils are equal, round, and reactive to light.   Cardiovascular: Intact distal pulses.   Pulmonary/Chest: Effort normal. No respiratory distress.   Musculoskeletal: Normal range of motion.   Neurological: He is alert and oriented to person, place, and time. He has a normal Finger-Nose-Finger Test, a normal Heel to Shin Test, a normal Romberg Test and a normal Tandem Gait Test. Gait normal.   Reflex Scores:       Tricep reflexes are 2+ on the right side and 2+ on the left side.       Bicep reflexes are 2+ on the right side and 2+ on the left  "side.       Brachioradialis reflexes are 2+ on the right side and 2+ on the left side.       Patellar reflexes are 2+ on the right side and 2+ on the left side.       Achilles reflexes are 2+ on the right side and 2+ on the left side.  Skin: Skin is warm and dry.   Psychiatric: He has a normal mood and affect. His speech is normal and behavior is normal.       Vitals:    12/17/18 1354   BP: (!) 156/88   BP Location: Right arm   Patient Position: Sitting   BP Method: Large (Automatic)   Pulse: 86   Weight: (!) 146.3 kg (322 lb 8.5 oz)   Height: 5' 9" (1.753 m)       Assessment & Plan:    Problem List Items Addressed This Visit     None      Visit Diagnoses     Bilateral carpal tunnel syndrome    -  Primary    Relevant Orders    EMG W/ ULTRASOUND AND NERVE CONDUCTION TEST 2 Extremities          Follow-up: Follow-up for EMG/NCS.   More than 50% of this 45 minute encounter was spent in counseling and coordinating care of CTS    "

## 2018-12-17 NOTE — LETTER
December 17, 2018      Azikiwe K. Lombard, MD  [address] [place] [address]           [facility] [address] [facility] [address]  [facility] [address]  Phone: [phone]  Fax: [phone]          Patient: Bj Pate Jr.   MR Number: [id number]   YOB: 1950   Date of Visit: 12/17/2018       Dear Dr. Azikiwe K. Lombard:    Thank you for referring Bj Pate to me for evaluation. Attached you will find relevant portions of my assessment and plan of care.    If you have questions, please do not hesitate to call me. I look forward to following Bj Pate along with you.    Sincerely,    Navin Alejo MD    Enclosure  CC:  No Recipients    If you would like to receive this communication electronically, please contact [email or web address] or [phone] to request more information on [employer] Link access.    For providers and/or their staff who would like to refer a patient to [facility], please contact us through our one-stop-shop provider referral line, [facility] [place], at [phone].    If you feel you have received this communication in error or would no longer like to receive these types of communications, please e-mail [email or web address]

## 2019-01-08 DIAGNOSIS — E11.42 DIABETIC POLYNEUROPATHY ASSOCIATED WITH TYPE 2 DIABETES MELLITUS: ICD-10-CM

## 2019-01-09 RX ORDER — GABAPENTIN 600 MG/1
TABLET ORAL
Qty: 90 TABLET | Refills: 2 | Status: SHIPPED | OUTPATIENT
Start: 2019-01-09 | End: 2019-03-18 | Stop reason: SDUPTHER

## 2019-01-10 DIAGNOSIS — E11.9 DIABETES MELLITUS WITHOUT COMPLICATION: Primary | ICD-10-CM

## 2019-01-11 ENCOUNTER — PROCEDURE VISIT (OUTPATIENT)
Dept: NEUROLOGY | Facility: CLINIC | Age: 69
End: 2019-01-11
Payer: MEDICARE

## 2019-01-11 DIAGNOSIS — G56.03 BILATERAL CARPAL TUNNEL SYNDROME: ICD-10-CM

## 2019-01-11 DIAGNOSIS — M54.12 CERVICAL RADICULOPATHY: Primary | ICD-10-CM

## 2019-01-11 PROCEDURE — 99214 PR OFFICE/OUTPT VISIT, EST, LEVL IV, 30-39 MIN: ICD-10-PCS | Mod: 25,S$GLB,, | Performed by: NEUROLOGICAL SURGERY

## 2019-01-11 PROCEDURE — 99214 OFFICE O/P EST MOD 30 MIN: CPT | Mod: 25,S$GLB,, | Performed by: NEUROLOGICAL SURGERY

## 2019-01-11 PROCEDURE — 95886 MUSC TEST DONE W/N TEST COMP: CPT | Mod: S$GLB,,, | Performed by: NEUROLOGICAL SURGERY

## 2019-01-11 PROCEDURE — 95911 PR NERVE CONDUCTION STUDY; 9-10 STUDIES: ICD-10-PCS | Mod: S$GLB,,, | Performed by: NEUROLOGICAL SURGERY

## 2019-01-11 PROCEDURE — 95886 PR EMG COMPLETE, W/ NERVE CONDUCTION STUDIES, 5+ MUSCLES: ICD-10-PCS | Mod: S$GLB,,, | Performed by: NEUROLOGICAL SURGERY

## 2019-01-11 PROCEDURE — 95911 NRV CNDJ TEST 9-10 STUDIES: CPT | Mod: S$GLB,,, | Performed by: NEUROLOGICAL SURGERY

## 2019-01-14 ENCOUNTER — TELEPHONE (OUTPATIENT)
Dept: NEUROLOGY | Facility: CLINIC | Age: 69
End: 2019-01-14

## 2019-01-14 NOTE — TELEPHONE ENCOUNTER
Needs something to take before the MRI which will be on 1-17-19 I told him Dr. Alejo will send it to his pharm the day before.        ----- Message from Margarita Walker sent at 1/14/2019 11:12 AM CST -----  Contact: Self  Patient called with questions about the information he received on Friday. Please call to advise at [phone] or [phone]

## 2019-01-16 ENCOUNTER — TELEPHONE (OUTPATIENT)
Dept: NEUROLOGY | Facility: CLINIC | Age: 69
End: 2019-01-16

## 2019-01-16 NOTE — TELEPHONE ENCOUNTER
----- Message from Bita Pulido sent at 1/16/2019  3:11 PM CST -----  Contact: Self   Patient states he is still waiting on the doctor to call his valium in before his test tomorrow. Please call at [phone].          [facility] [address] [place] [address] [place] AT [facility] OF [facility]      Patient Notified, prescription called in

## 2019-01-17 ENCOUNTER — HOSPITAL ENCOUNTER (OUTPATIENT)
Dept: RADIOLOGY | Facility: HOSPITAL | Age: 69
Discharge: HOME | End: 2019-01-17
Attending: NEUROLOGICAL SURGERY
Payer: MEDICARE

## 2019-01-17 DIAGNOSIS — G56.03 BILATERAL CARPAL TUNNEL SYNDROME: ICD-10-CM

## 2019-01-17 DIAGNOSIS — M54.12 CERVICAL RADICULOPATHY: ICD-10-CM

## 2019-01-17 PROCEDURE — 72141 MRI NECK SPINE W/O DYE: CPT | Mod: TC

## 2019-01-17 PROCEDURE — 72141 MRI NECK SPINE W/O DYE: CPT | Mod: 26,,, | Performed by: RADIOLOGY

## 2019-01-17 PROCEDURE — 72141 MRI CERVICAL SPINE WITHOUT CONTRAST: ICD-10-PCS | Mod: 26,,, | Performed by: RADIOLOGY

## 2019-01-23 ENCOUNTER — OFFICE VISIT (OUTPATIENT)
Dept: FAMILY MEDICINE | Facility: CLINIC | Age: 69
End: 2019-01-23
Payer: MEDICARE

## 2019-01-23 VITALS
DIASTOLIC BLOOD PRESSURE: 80 MMHG | RESPIRATION RATE: 17 BRPM | BODY MASS INDEX: 46.65 KG/M2 | WEIGHT: 315 LBS | OXYGEN SATURATION: 97 % | HEART RATE: 84 BPM | TEMPERATURE: 98 F | HEIGHT: 69 IN | SYSTOLIC BLOOD PRESSURE: 120 MMHG

## 2019-01-23 DIAGNOSIS — I10 ESSENTIAL HYPERTENSION: Primary | ICD-10-CM

## 2019-01-23 DIAGNOSIS — E66.01 MORBID OBESITY WITH BMI OF 45.0-49.9, ADULT: ICD-10-CM

## 2019-01-23 DIAGNOSIS — E11.9 DIET-CONTROLLED DIABETES MELLITUS: ICD-10-CM

## 2019-01-23 DIAGNOSIS — J00 ACUTE NASOPHARYNGITIS (COMMON COLD): ICD-10-CM

## 2019-01-23 PROCEDURE — 99499 RISK ADDL DX/OHS AUDIT: ICD-10-PCS | Mod: S$GLB,,, | Performed by: FAMILY MEDICINE

## 2019-01-23 PROCEDURE — 3079F DIAST BP 80-89 MM HG: CPT | Mod: CPTII,S$GLB,, | Performed by: FAMILY MEDICINE

## 2019-01-23 PROCEDURE — 99214 OFFICE O/P EST MOD 30 MIN: CPT | Mod: S$GLB,,, | Performed by: FAMILY MEDICINE

## 2019-01-23 PROCEDURE — 3044F HG A1C LEVEL LT 7.0%: CPT | Mod: CPTII,S$GLB,, | Performed by: FAMILY MEDICINE

## 2019-01-23 PROCEDURE — 3074F PR MOST RECENT SYSTOLIC BLOOD PRESSURE < 130 MM HG: ICD-10-PCS | Mod: CPTII,S$GLB,, | Performed by: FAMILY MEDICINE

## 2019-01-23 PROCEDURE — 99999 PR PBB SHADOW E&M-EST. PATIENT-LVL III: ICD-10-PCS | Mod: PBBFAC,,, | Performed by: FAMILY MEDICINE

## 2019-01-23 PROCEDURE — 3044F PR MOST RECENT HEMOGLOBIN A1C LEVEL <7.0%: ICD-10-PCS | Mod: CPTII,S$GLB,, | Performed by: FAMILY MEDICINE

## 2019-01-23 PROCEDURE — 1101F PT FALLS ASSESS-DOCD LE1/YR: CPT | Mod: CPTII,S$GLB,, | Performed by: FAMILY MEDICINE

## 2019-01-23 PROCEDURE — 99999 PR PBB SHADOW E&M-EST. PATIENT-LVL III: CPT | Mod: PBBFAC,,, | Performed by: FAMILY MEDICINE

## 2019-01-23 PROCEDURE — 3079F PR MOST RECENT DIASTOLIC BLOOD PRESSURE 80-89 MM HG: ICD-10-PCS | Mod: CPTII,S$GLB,, | Performed by: FAMILY MEDICINE

## 2019-01-23 PROCEDURE — 99214 PR OFFICE/OUTPT VISIT, EST, LEVL IV, 30-39 MIN: ICD-10-PCS | Mod: S$GLB,,, | Performed by: FAMILY MEDICINE

## 2019-01-23 PROCEDURE — 1101F PR PT FALLS ASSESS DOC 0-1 FALLS W/OUT INJ PAST YR: ICD-10-PCS | Mod: CPTII,S$GLB,, | Performed by: FAMILY MEDICINE

## 2019-01-23 PROCEDURE — 99499 UNLISTED E&M SERVICE: CPT | Mod: S$GLB,,, | Performed by: FAMILY MEDICINE

## 2019-01-23 PROCEDURE — 3074F SYST BP LT 130 MM HG: CPT | Mod: CPTII,S$GLB,, | Performed by: FAMILY MEDICINE

## 2019-01-23 RX ORDER — FLUTICASONE PROPIONATE 50 MCG
1 SPRAY, SUSPENSION (ML) NASAL
COMMUNITY
Start: 2019-01-07 | End: 2020-02-18 | Stop reason: CLARIF

## 2019-01-23 RX ORDER — DIAZEPAM 10 MG/1
10 TABLET ORAL DAILY
Refills: 0 | COMMUNITY
Start: 2019-01-16 | End: 2019-11-04

## 2019-01-23 RX ORDER — DILTIAZEM HYDROCHLORIDE 240 MG/1
240 CAPSULE, COATED, EXTENDED RELEASE ORAL DAILY
Qty: 90 CAPSULE | Refills: 1 | Status: SHIPPED | OUTPATIENT
Start: 2019-01-23 | End: 2019-06-12 | Stop reason: SDUPTHER

## 2019-01-23 RX ORDER — METHYLPREDNISOLONE 4 MG/1
TABLET ORAL
Qty: 1 PACKAGE | Refills: 0 | Status: SHIPPED | OUTPATIENT
Start: 2019-01-23 | End: 2019-04-26

## 2019-01-23 RX ORDER — LISINOPRIL AND HYDROCHLOROTHIAZIDE 20; 25 MG/1; MG/1
1 TABLET ORAL DAILY
Qty: 90 TABLET | Refills: 1 | Status: SHIPPED | OUTPATIENT
Start: 2019-01-23 | End: 2019-08-19 | Stop reason: SDUPTHER

## 2019-01-23 NOTE — PROGRESS NOTES
Chief Complaint   Patient presents with    DEBBIE Millskacie Pate Jr. is a 69 y.o. male who presents per the Chief Complaint.  Pt is known to me and was last seen by me on 12/5/2018.  All known chronic medical issues have been documented.       Diabetes   He presents for his follow-up diabetic visit. He has type 2 diabetes mellitus. His disease course has been stable. There are no hypoglycemic associated symptoms. Pertinent negatives for hypoglycemia include no confusion, dizziness (resolving), headaches, mood changes, nervousness/anxiousness, pallor, seizures, sleepiness, speech difficulty, sweats or tremors. There are no diabetic associated symptoms. Pertinent negatives for diabetes include no blurred vision, no chest pain, no fatigue, no foot paresthesias, no foot ulcerations, no polydipsia, no polyphagia, no polyuria, no visual change, no weakness and no weight loss. There are no hypoglycemic complications. Symptoms are stable. There are no diabetic complications. Pertinent negatives for diabetic complications include no CVA, PVD or retinopathy. Risk factors for coronary artery disease include diabetes mellitus, male sex, obesity, hypertension and dyslipidemia. When asked about current treatments, none were reported. His weight is stable. He is following a generally healthy diet. When asked about meal planning, he reported none. He has not had a previous visit with a dietitian. He rarely participates in exercise. There is no change in his home blood glucose trend. An ACE inhibitor/angiotensin II receptor blocker is being taken. He sees a podiatrist.Eye exam is current.   Hypertension   This is a chronic problem. The current episode started more than 1 year ago. The problem has been gradually worsening since onset. The problem is uncontrolled. Pertinent negatives include no anxiety, blurred vision, chest pain, headaches, malaise/fatigue, neck pain, orthopnea, palpitations, peripheral edema, PND, shortness of  breath or sweats. Agents associated with hypertension include amphetamines. Risk factors for coronary artery disease include obesity, male gender, dyslipidemia and sedentary lifestyle. Past treatments include ACE inhibitors, calcium channel blockers and diuretics. The current treatment provides moderate improvement. Compliance problems include exercise.  There is no history of angina, kidney disease (improved), CAD/MI, CVA, heart failure, left ventricular hypertrophy, PVD or retinopathy. Identifiable causes of hypertension include sleep apnea. There is no history of chronic renal disease (improved), coarctation of the aorta, hyperaldosteronism, hypercortisolism, hyperparathyroidism, a hypertension causing med, pheochromocytoma, renovascular disease or a thyroid problem.        ROS  Review of Systems   Constitutional: Negative.  Negative for activity change, appetite change, chills, diaphoresis, fatigue, fever, malaise/fatigue, unexpected weight change and weight loss.   HENT: Negative.  Negative for congestion, ear pain, hearing loss, nosebleeds, postnasal drip, rhinorrhea, sinus pressure, sneezing, sore throat and trouble swallowing.    Eyes: Negative for blurred vision, pain and visual disturbance.   Respiratory: Negative for cough, choking and shortness of breath.    Cardiovascular: Negative for chest pain, palpitations, orthopnea, leg swelling and PND.   Gastrointestinal: Negative for abdominal pain, constipation, diarrhea, nausea and vomiting.   Endocrine: Negative for polydipsia, polyphagia and polyuria.   Genitourinary: Negative for difficulty urinating, dysuria, frequency and urgency.   Musculoskeletal: Positive for arthralgias and back pain. Negative for gait problem, joint swelling, myalgias and neck pain.   Skin: Negative.  Negative for pallor.   Allergic/Immunologic: Negative for environmental allergies and food allergies.   Neurological: Positive for numbness (tingling legs and arms). Negative for  "dizziness (resolving), tremors, seizures, syncope, speech difficulty, weakness, light-headedness and headaches.   Psychiatric/Behavioral: Negative.  Negative for confusion, decreased concentration, dysphoric mood and sleep disturbance. The patient is not nervous/anxious.        Physical Exam  Vitals:    01/23/19 1445   BP: 120/80   Pulse: 84   Resp: 17   Temp: 98.4 °F (36.9 °C)    Body mass index is 48.35 kg/m².  Weight: (!) 148.5 kg (327 lb 6.1 oz)   Height: 5' 9" (175.3 cm)     Physical Exam   Constitutional: He is oriented to person, place, and time. He appears well-developed and well-nourished. He is active and cooperative.  Non-toxic appearance. He does not have a sickly appearance. He does not appear ill. No distress.   HENT:   Head: Normocephalic and atraumatic.   Right Ear: Hearing and external ear normal. No decreased hearing is noted.   Left Ear: Hearing and external ear normal. No decreased hearing is noted.   Nose: Nose normal. No rhinorrhea or nasal deformity.   Mouth/Throat: Uvula is midline and oropharynx is clear and moist. He does not have dentures. Normal dentition.   Eyes: Conjunctivae, EOM and lids are normal. Pupils are equal, round, and reactive to light. Right eye exhibits no chemosis, no discharge and no exudate. No foreign body present in the right eye. Left eye exhibits no chemosis, no discharge and no exudate. No foreign body present in the left eye. No scleral icterus.   Neck: Normal range of motion and full passive range of motion without pain. Neck supple.   Cardiovascular: Normal rate, regular rhythm, S1 normal, S2 normal and normal heart sounds. Exam reveals no gallop and no friction rub.   No murmur heard.  Pulmonary/Chest: Effort normal and breath sounds normal. No accessory muscle usage. No respiratory distress. He has no decreased breath sounds. He has no wheezes. He has no rhonchi. He has no rales.   Abdominal: Soft. Normal appearance. He exhibits no distension. There is no " hepatosplenomegaly. There is no tenderness. There is no rigidity, no rebound and no guarding.   Musculoskeletal: Normal range of motion.        Cervical back: He exhibits tenderness and pain. He exhibits normal range of motion, no bony tenderness, no swelling, no edema, no deformity, no laceration, no spasm and normal pulse.        Right hand: He exhibits normal range of motion, no tenderness, no bony tenderness, normal two-point discrimination, normal capillary refill, no deformity, no laceration and no swelling. Decreased sensation noted. Normal strength noted.        Left hand: He exhibits normal range of motion, no tenderness, no bony tenderness, normal two-point discrimination, normal capillary refill, no deformity, no laceration and no swelling. Decreased sensation noted. Normal strength noted.        Hands:  Fingertips with decreased sensation; capillary refill appears normal.   Neurological: He is alert and oriented to person, place, and time. He has normal strength. No cranial nerve deficit or sensory deficit. He exhibits normal muscle tone. He displays no seizure activity. Coordination and gait normal.   Skin: Skin is warm, dry and intact. No rash noted. He is not diaphoretic.   Psychiatric: He has a normal mood and affect. His speech is normal and behavior is normal. Judgment and thought content normal. Cognition and memory are normal. He is attentive.       Assessment & Plan    1. Essential hypertension  Patient was counseled and encouraged to maintain a low sodium diet, as well as increasing physical activity.  Recommend random BP checks at home on a regular basis.  Repeat BP at end of visit was not necessary. Will continue medication at this time, and follow up in 3-6 months, or sooner if blood pressure begins to increase.     - diltiaZEM (CARTIA XT) 240 MG 24 hr capsule; Take 1 capsule (240 mg total) by mouth once daily.  Dispense: 90 capsule; Refill: 1  - lisinopril-hydrochlorothiazide  (PRINZIDE,ZESTORETIC) 20-25 mg Tab; Take 1 tablet by mouth once daily.  Dispense: 90 tablet; Refill: 1    2. Acute nasopharyngitis (common cold)  Patient was advised to remain hydrated throughout illness and to use an antihistamine like loratadine or cetirizine daily.  I advised that a multi-symptom medication like Nyquil or Tylenol Cold and Flu or a sinus decongestant like Sudafed may raise blood pressure and should be used for no more than three days to alleviate symptoms.  If symptoms worsen, patient should follow up for further evaluation.  Short course of oral steroid given for symptom relief.   - methylPREDNISolone (MEDROL DOSEPACK) 4 mg tablet; use as directed  Dispense: 1 Package; Refill: 0    3. Diet-controlled diabetes mellitus  Patient is encouraged to follow a diet low in carbohydrates and simple sugars.  Discussed simple vs. complex carbohydrates as well as eating times of certain meals. Advised to focus on good food choices and increased physical activity and encouraged to adhere to lifestyle adjustments, and to check glucose level as recommended.  Contact office if glucose levels are not improving over time.  .       4. Morbid obesity with BMI of 45.0-49.9, adult  We discussed weight and safe, effective ways of losing pounds, including low carbohydrate, low fat diet and increasing physical activity to improve cardiovascular performance and increase metabolism.  Patient was encouraged to set realistic attainable goals for weight loss, and we will follow up periodically.       Follow up documented    ACTIVE MEDICAL ISSUES:  Documented in Problem List    PAST MEDICAL HISTORY  Documented    PAST SURGICAL HISTORY:  Documented    SOCIAL HISTORY:  Documented    FAMILY HISTORY:  Documented    ALLERGIES AND MEDICATIONS: updated and reviewed.  Documented    Health Maintenance       Date Due Completion Date    Zoster Vaccine 01/21/2012 ---    Hemoglobin A1c 01/25/2019 7/25/2018    Override on 4/8/2016: Done  (future)    Override on 1/4/2016: Done (future)    Override on 8/22/2014: Done    Foot Exam 01/30/2019 1/30/2018    Override on 7/20/2015: Done (today in [facility])    Lipid Panel 07/25/2019 7/25/2018    Override on 8/22/2014: Done    Fecal Occult Blood Test (FOBT)/[employer] 08/27/2019 8/27/2018    Eye Exam 12/13/2019 12/13/2018 (Done)    Override on 12/13/2018: Done (Dr Jessy Matthew)    Override on 3/14/2017: Done    Override on 4/8/2016: Done (future)    Override on 8/22/2014: Done    Low Dose Statin 01/23/2020 1/23/2019    TETANUS VACCINE 02/19/2026 2/19/2016

## 2019-01-24 ENCOUNTER — OFFICE VISIT (OUTPATIENT)
Dept: NEUROLOGY | Facility: CLINIC | Age: 69
End: 2019-01-24
Payer: MEDICARE

## 2019-01-24 VITALS
DIASTOLIC BLOOD PRESSURE: 56 MMHG | WEIGHT: 315 LBS | BODY MASS INDEX: 46.65 KG/M2 | HEIGHT: 69 IN | SYSTOLIC BLOOD PRESSURE: 117 MMHG | HEART RATE: 47 BPM

## 2019-01-24 DIAGNOSIS — M54.12 CERVICAL RADICULOPATHY: Primary | ICD-10-CM

## 2019-01-24 PROCEDURE — 1101F PT FALLS ASSESS-DOCD LE1/YR: CPT | Mod: CPTII,S$GLB,, | Performed by: NEUROLOGICAL SURGERY

## 2019-01-24 PROCEDURE — 1101F PR PT FALLS ASSESS DOC 0-1 FALLS W/OUT INJ PAST YR: ICD-10-PCS | Mod: CPTII,S$GLB,, | Performed by: NEUROLOGICAL SURGERY

## 2019-01-24 PROCEDURE — 3078F PR MOST RECENT DIASTOLIC BLOOD PRESSURE < 80 MM HG: ICD-10-PCS | Mod: CPTII,S$GLB,, | Performed by: NEUROLOGICAL SURGERY

## 2019-01-24 PROCEDURE — 3074F SYST BP LT 130 MM HG: CPT | Mod: CPTII,S$GLB,, | Performed by: NEUROLOGICAL SURGERY

## 2019-01-24 PROCEDURE — 3078F DIAST BP <80 MM HG: CPT | Mod: CPTII,S$GLB,, | Performed by: NEUROLOGICAL SURGERY

## 2019-01-24 PROCEDURE — 99214 PR OFFICE/OUTPT VISIT, EST, LEVL IV, 30-39 MIN: ICD-10-PCS | Mod: S$GLB,,, | Performed by: NEUROLOGICAL SURGERY

## 2019-01-24 PROCEDURE — 99214 OFFICE O/P EST MOD 30 MIN: CPT | Mod: S$GLB,,, | Performed by: NEUROLOGICAL SURGERY

## 2019-01-24 PROCEDURE — 99999 PR PBB SHADOW E&M-EST. PATIENT-LVL II: ICD-10-PCS | Mod: PBBFAC,,, | Performed by: NEUROLOGICAL SURGERY

## 2019-01-24 PROCEDURE — 99999 PR PBB SHADOW E&M-EST. PATIENT-LVL II: CPT | Mod: PBBFAC,,, | Performed by: NEUROLOGICAL SURGERY

## 2019-01-24 PROCEDURE — 3074F PR MOST RECENT SYSTOLIC BLOOD PRESSURE < 130 MM HG: ICD-10-PCS | Mod: CPTII,S$GLB,, | Performed by: NEUROLOGICAL SURGERY

## 2019-01-25 ENCOUNTER — LAB VISIT (OUTPATIENT)
Dept: LAB | Facility: HOSPITAL | Age: 69
End: 2019-01-25
Attending: FAMILY MEDICINE
Payer: MEDICARE

## 2019-01-25 DIAGNOSIS — E11.9 DIABETES MELLITUS WITHOUT COMPLICATION: ICD-10-CM

## 2019-01-25 LAB
ESTIMATED AVG GLUCOSE: 114 MG/DL
HBA1C MFR BLD HPLC: 5.6 %

## 2019-01-25 PROCEDURE — 83036 HEMOGLOBIN GLYCOSYLATED A1C: CPT

## 2019-01-25 PROCEDURE — 36415 COLL VENOUS BLD VENIPUNCTURE: CPT | Mod: PO

## 2019-01-25 NOTE — PROGRESS NOTES
Chief Complaint   Patient presents with    Follow-up     MRI results          Bj Pate Jr. is a 69 y.o. right handed male referred by Dr. Lombard for evaluation of bilateral numbness, tingling, weakness. This is evaluated as a personal injury. The onset of symptoms was gradual, starting about several years ago The pain is described as numbing, shooting and tingling.   Symptoms occur continuously and last several minutes.  He has had night time symptoms.  Restricted activities include: repetitive use pattern, limited  activities. He has not had Physical Therapy for these symptoms.  The pain is relieved by rest. His work is retired and he has not missed work. He is a retired  and worked for years on the road. He had began to notice symptoms only at night, but has gradually began to notice that the symptoms are coming more frequently during the day and when he has been at rest as well. holding his arm in any one position has also triggered his symptoms.    Interval history  01/24/2019  He presents to clinic for follow-up after having MRI to evaluate his symptoms numbness in the arms and hands.  The patient has been having ongoing symptoms seem to be more bothersome every week.  The patient has not had any change in strength in his arms, but has had weakness that seems to be stagnant over the last several.  He has been taking medications as directed but has not noticed much if any improvement in his symptoms.  Patient says that he does not want to continue to get worse but admits that he would be reluctant to discuss any surgical options at this point..     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     Benign hypertension with CKD (chronic kidney disease) stage III      Cataract     Chronic idiopathic gout of multiple sites     Chronic kidney disease,  stage 3     Erectile dysfunction     Hemorrhoids without complication     Morbid obesity     Obstructive sleep apnea on CPAP     Stroke 2016, 2006    Thalamic infarct, acute (right) 1/2016    Type 2 DM with CKD stage 3 and hypertension     On pravastatin for cardiovascular protection.        PAST SURGICAL HISTORY:  Past Surgical History:   Procedure Laterality Date    NO PAST SURGERIES         SOCIAL HISTORY:  Social History     Socioeconomic History    Marital status:      Spouse name: Not on file    Number of children: 3    Years of education: Not on file    Highest education level: Not on file   Social Needs    Financial resource strain: Not on file    Food insecurity - worry: Not on file    Food insecurity - inability: Not on file    Transportation needs - medical: Not on file    Transportation needs - non-medical: Not on file   Occupational History    Occupation:    Tobacco Use    Smoking status: Never Smoker    Smokeless tobacco: Never Used   Substance and Sexual Activity    Alcohol use: Yes     Alcohol/week: 0.0 oz     Comment: occacionally    Drug use: No    Sexual activity: Not on file   Other Topics Concern    Not on file   Social History Narrative    Not on file       FAMILY HISTORY:  Family History   Problem Relation Age of Onset    No Known Problems Mother     No Known Problems Father     Hypertension Unknown     Diabetes Unknown     Diabetes Paternal Grandfather     Heart disease Paternal Grandfather     No Known Problems Sister     No Known Problems Brother     No Known Problems Maternal Aunt     No Known Problems Maternal Uncle     No Known Problems Paternal Aunt     No Known Problems Paternal Uncle     No Known Problems Maternal Grandmother     No Known Problems Maternal Grandfather     No Known Problems Paternal Grandmother     Amblyopia Neg Hx     Blindness Neg Hx     Cancer Neg Hx     Cataracts Neg Hx     Glaucoma Neg Hx      Macular degeneration Neg Hx     Retinal detachment Neg Hx     Strabismus Neg Hx     Stroke Neg Hx     Thyroid disease Neg Hx        ALLERGIES AND MEDICATIONS: updated and reviewed.  Review of patient's allergies indicates:   Allergen Reactions    Tomato (solanum lycopersicum) Hives    Naproxen Hives    Shrimp Other (See Comments)     Current Outpatient Medications   Medication Sig Dispense Refill    acetaminophen-codeine 300-30mg (TYLENOL #3) 300-30 mg Tab as needed.       ADVAIR DISKUS 250-50 mcg/dose diskus inhaler INHALE ONE PUFF BY MOUTH TWICE DAILY 60 each 2    albuterol 90 mcg/actuation inhaler Inhale 2 puffs into the lungs every 6 (six) hours as needed for Wheezing. 1 Inhaler 11    aspirin 325 MG tablet Take 1 tablet (325 mg total) by mouth once daily. (Patient taking differently: Take 81 mg by mouth once daily. )  0    atorvastatin (LIPITOR) 40 MG tablet TAKE 1 TABLET BY MOUTH ONCE DAILY 90 tablet 0    blood pressure test kit-large Kit 1 Device by Misc.(Non-Drug; Combo Route) route 2 (two) times daily. 1 each 0    clobetasol 0.05% (TEMOVATE) 0.05 % Oint Apply topically 2 (two) times daily. 45 g 5    COLCRYS 0.6 mg tablet TAKE 2 TABLETS BY MOUTH AT THE FIRST SIGN OF GOUT FLARE THEN 1 TABLET 1 HOUR LATER 30 tablet 2    diazePAM (VALIUM) 10 MG Tab Take 10 mg by mouth once daily.   0    diclofenac sodium 1 % Gel Apply 2 g topically once daily. 100 g 5    diltiaZEM (CARTIA XT) 240 MG 24 hr capsule Take 1 capsule (240 mg total) by mouth once daily. 90 capsule 1    FLUAD 9634-0366, 65 YR UP,,PF, 45 mcg (15 mcg x 3)/0.5 mL Syrg ADM 0.5ML IM UTD  0    fluticasone (FLONASE) 50 mcg/actuation nasal spray       fluticasone-salmeterol 250-50 mcg/dose (ADVAIR DISKUS) 250-50 mcg/dose diskus inhaler INHALE ONE PUFF INTO LUNGS TWICE DAILY 60 each 2    FLUZONE HIGH-DOSE 2017-18, PF, 180 mcg/0.5 mL vaccine ADM 0.5ML IM UTD  0    gabapentin (NEURONTIN) 600 MG tablet TAKE 1 TABLET BY MOUTH THREE TIMES DAILY  90 tablet 2    levocetirizine (XYZAL) 5 MG tablet TAKE ONE TABLET BY MOUTH ONCE DAILY IN THE EVENING 30 tablet 11    lisinopril-hydrochlorothiazide (PRINZIDE,ZESTORETIC) 20-25 mg Tab Take 1 tablet by mouth once daily. 90 tablet 1    methylPREDNISolone (MEDROL DOSEPACK) 4 mg tablet use as directed 1 Package 0     No current facility-administered medications for this visit.        Review of Systems   Constitutional: Negative for activity change, fatigue and unexpected weight change.   HENT: Negative for trouble swallowing and voice change.    Eyes: Negative for photophobia, pain and visual disturbance.   Respiratory: Negative for apnea and shortness of breath.    Cardiovascular: Negative for chest pain and palpitations.   Gastrointestinal: Negative for constipation, nausea and vomiting.   Genitourinary: Negative for difficulty urinating.   Musculoskeletal: Negative for arthralgias, back pain, gait problem, myalgias and neck pain.   Skin: Negative for color change and rash.   Neurological: Positive for weakness and numbness. Negative for dizziness, seizures, syncope, speech difficulty, light-headedness and headaches.   Psychiatric/Behavioral: Negative for agitation, behavioral problems and confusion.       Neurologic Exam     Mental Status   Oriented to person, place, and time.   Registration: recalls 3 of 3 objects.   Attention: normal. Concentration: normal.   Speech: speech is normal   Level of consciousness: alert  Knowledge: good.     Cranial Nerves     CN II   Visual fields full to confrontation.   Right visual field deficit: none  Left visual field deficit: none     CN III, IV, VI   Pupils are equal, round, and reactive to light.  Extraocular motions are normal.   Right pupil: Size: 3 mm. Shape: regular. Accommodation: intact.   Left pupil: Size: 3 mm. Shape: regular. Accommodation: intact.   CN III: no CN III palsy  CN VI: no CN VI palsy  Nystagmus: none   Diplopia: none  Ophthalmoparesis: none  Upgaze:  normal  Downgaze: normal  Conjugate gaze: present    CN V   Facial sensation intact.   Right facial sensation deficit: none  Left facial sensation deficit: none    CN VII   Facial expression full, symmetric.   Right facial weakness: none  Left facial weakness: none    CN VIII   CN VIII normal.     CN IX, X   CN IX normal.   CN X normal.   Palate: symmetric    CN XI   CN XI normal.   Right sternocleidomastoid strength: normal  Left sternocleidomastoid strength: normal  Right trapezius strength: normal  Left trapezius strength: normal    CN XII   CN XII normal.   Tongue deviation: none    Motor Exam   Muscle bulk: normal  Overall muscle tone: normal  Right arm tone: normal  Left arm tone: normal  Right leg tone: normal  Left leg tone: normal    Strength   Strength 5/5 except as noted.   Right interossei: 4/5  Left interossei: 4/5    Sensory Exam   Right arm light touch: decreased from fingers  Left arm light touch: decreased from fingers  Right leg light touch: normal  Left leg light touch: normal  Right arm vibration: decreased from fingers  Left arm vibration: decreased from fingers  Right leg vibration: normal  Left leg vibration: normal  Right arm proprioception: normal  Left arm proprioception: normal  Right leg proprioception: normal  Left leg proprioception: normal  Right arm pinprick: decreased from fingers  Left arm pinprick: decreased from fingers  Right leg pinprick: normal  Left leg pinprick: normal  Sensory deficit distribution on right: median  Sensory deficit distribution on left: median    Gait, Coordination, and Reflexes     Gait  Gait: normal    Coordination   Romberg: negative  Finger to nose coordination: normal  Heel to shin coordination: normal  Tandem walking coordination: normal    Tremor   Resting tremor: absent    Reflexes   Right brachioradialis: 2+  Left brachioradialis: 2+  Right biceps: 2+  Left biceps: 2+  Right triceps: 2+  Left triceps: 2+  Right patellar: 2+  Left patellar: 2+  Right  "achilles: 2+  Left achilles: 2+  Right plantar: normal  Left plantar: normal      Physical Exam   Constitutional: He is oriented to person, place, and time. He appears well-developed and well-nourished.   HENT:   Head: Normocephalic and atraumatic.   Eyes: EOM are normal. Pupils are equal, round, and reactive to light.   Cardiovascular: Intact distal pulses.   Pulmonary/Chest: Effort normal. No respiratory distress.   Musculoskeletal: Normal range of motion.   Neurological: He is alert and oriented to person, place, and time. He has a normal Finger-Nose-Finger Test, a normal Heel to Shin Test, a normal Romberg Test and a normal Tandem Gait Test. Gait normal.   Reflex Scores:       Tricep reflexes are 2+ on the right side and 2+ on the left side.       Bicep reflexes are 2+ on the right side and 2+ on the left side.       Brachioradialis reflexes are 2+ on the right side and 2+ on the left side.       Patellar reflexes are 2+ on the right side and 2+ on the left side.       Achilles reflexes are 2+ on the right side and 2+ on the left side.  Skin: Skin is warm and dry.   Psychiatric: He has a normal mood and affect. His speech is normal and behavior is normal.       Vitals:    01/24/19 1315   BP: (!) 117/56   BP Location: Right arm   Patient Position: Sitting   BP Method: Large (Automatic)   Pulse: (!) 47   Weight: (!) 148.5 kg (327 lb 6.1 oz)   Height: 5' 9" (1.753 m)       Assessment & Plan:    Problem List Items Addressed This Visit     None      Visit Diagnoses     Cervical radiculopathy    -  Primary    Relevant Orders    Ambulatory Referral to Physical/Occupational Therapy          Follow-up: Follow-up in about 3 months (around 4/24/2019).   More than 50% of this 25 minute encounter was spent in counseling and coordinating care of cervical radiculopathy    "

## 2019-01-28 ENCOUNTER — TELEPHONE (OUTPATIENT)
Dept: FAMILY MEDICINE | Facility: CLINIC | Age: 69
End: 2019-01-28

## 2019-01-28 NOTE — TELEPHONE ENCOUNTER
Notified patient of information below. Verbalized understanding   Patient request results to be mailed as well. Printed and mailed out

## 2019-01-28 NOTE — TELEPHONE ENCOUNTER
----- Message from Azikiwe K. Lombard, MD sent at 1/28/2019  2:34 PM CST -----  Please notify patient of normal HbA1c results by phone, and schedule follow up  within 6 months.

## 2019-02-01 DIAGNOSIS — G47.33 OBSTRUCTIVE SLEEP APNEA ON CPAP: Primary | ICD-10-CM

## 2019-02-04 ENCOUNTER — TELEPHONE (OUTPATIENT)
Dept: FAMILY MEDICINE | Facility: CLINIC | Age: 69
End: 2019-02-04

## 2019-02-04 NOTE — TELEPHONE ENCOUNTER
Spoke with patient was told that order was changed per Provider since 02/01/19 , patient verbalized understand .

## 2019-02-05 DIAGNOSIS — N18.30 TYPE 2 DIABETES MELLITUS WITH STAGE 3 CHRONIC KIDNEY DISEASE, WITHOUT LONG-TERM CURRENT USE OF INSULIN: Primary | ICD-10-CM

## 2019-02-05 DIAGNOSIS — E11.22 TYPE 2 DIABETES MELLITUS WITH STAGE 3 CHRONIC KIDNEY DISEASE, WITHOUT LONG-TERM CURRENT USE OF INSULIN: Primary | ICD-10-CM

## 2019-02-05 RX ORDER — INSULIN PUMP SYRINGE, 3 ML
EACH MISCELLANEOUS
Qty: 1 EACH | Refills: 0 | Status: ON HOLD | OUTPATIENT
Start: 2019-02-05 | End: 2023-12-28 | Stop reason: HOSPADM

## 2019-02-05 RX ORDER — LANCETS
EACH MISCELLANEOUS
Qty: 200 EACH | Refills: 3 | Status: SHIPPED | OUTPATIENT
Start: 2019-02-05 | End: 2020-10-02 | Stop reason: SDUPTHER

## 2019-02-06 VITALS — HEIGHT: 69 IN | TEMPERATURE: 94 F | BODY MASS INDEX: 46.65 KG/M2 | WEIGHT: 315 LBS

## 2019-02-07 NOTE — PROCEDURES
Chief Complaint   Patient presents with    Other Misc     EMG         Bj Pate Jr. is a 69 y.o. right handed male referred by Dr. Lombard for evaluation of bilateral numbness, tingling, weakness. This is evaluated as a personal injury. The onset of symptoms was gradual, starting about several years ago The pain is described as numbing, shooting and tingling.   Symptoms occur continuously and last several minutes.  He has had night time symptoms.  Restricted activities include: repetitive use pattern, limited  activities. He has not had Physical Therapy for these symptoms.  The pain is relieved by rest. His work is retired and he has not missed work. He is a retired  and worked for years on the road. He had began to notice symptoms only at night, but has gradually began to notice that the symptoms are coming more frequently during the day and when he has been at rest as well. holding his arm in any one position has also triggered his symptoms.    Interval History  01/11/2019  He presents to clinic today to have EMG/nerve conduction studies to evaluate the numbness, tingling, and weakness in his hands.  The patient continues to have numbing shooting, pins and needles like sensation that have been traveling from the neck down into the fingers on both sides.  He says that he continues to be limited in what he can do before the pain begins to increase to a point where it is unbearable.  He feels like his  strength has also been limited in his manual dexterity has decreased as well.     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     Benign hypertension with CKD (chronic kidney disease) stage III      Cataract     Chronic idiopathic gout of multiple sites     Chronic kidney disease, stage 3     Erectile dysfunction     Hemorrhoids without complication      Morbid obesity     Obstructive sleep apnea on CPAP     Stroke 2016, 2006    Thalamic infarct, acute (right) 1/2016    Type 2 DM with CKD stage 3 and hypertension     On pravastatin for cardiovascular protection.        PAST SURGICAL HISTORY:  Past Surgical History:   Procedure Laterality Date    NO PAST SURGERIES         SOCIAL HISTORY:  Social History     Socioeconomic History    Marital status:      Spouse name: Not on file    Number of children: 3    Years of education: Not on file    Highest education level: Not on file   Social Needs    Financial resource strain: Not on file    Food insecurity - worry: Not on file    Food insecurity - inability: Not on file    Transportation needs - medical: Not on file    Transportation needs - non-medical: Not on file   Occupational History    Occupation:    Tobacco Use    Smoking status: Never Smoker    Smokeless tobacco: Never Used   Substance and Sexual Activity    Alcohol use: Yes     Alcohol/week: 0.0 oz     Comment: occacionally    Drug use: No    Sexual activity: Not on file   Other Topics Concern    Not on file   Social History Narrative    Not on file       FAMILY HISTORY:  Family History   Problem Relation Age of Onset    No Known Problems Mother     No Known Problems Father     Hypertension Unknown     Diabetes Unknown     Diabetes Paternal Grandfather     Heart disease Paternal Grandfather     No Known Problems Sister     No Known Problems Brother     No Known Problems Maternal Aunt     No Known Problems Maternal Uncle     No Known Problems Paternal Aunt     No Known Problems Paternal Uncle     No Known Problems Maternal Grandmother     No Known Problems Maternal Grandfather     No Known Problems Paternal Grandmother     Amblyopia Neg Hx     Blindness Neg Hx     Cancer Neg Hx     Cataracts Neg Hx     Glaucoma Neg Hx     Macular degeneration Neg Hx     Retinal detachment Neg Hx     Strabismus Neg  Hx     Stroke Neg Hx     Thyroid disease Neg Hx        ALLERGIES AND MEDICATIONS: updated and reviewed.  Review of patient's allergies indicates:   Allergen Reactions    Tomato (solanum lycopersicum) Hives    Naproxen Hives    Shrimp Other (See Comments)     Current Outpatient Medications   Medication Sig Dispense Refill    acetaminophen-codeine 300-30mg (TYLENOL #3) 300-30 mg Tab as needed.       ADVAIR DISKUS 250-50 mcg/dose diskus inhaler INHALE ONE PUFF BY MOUTH TWICE DAILY 60 each 2    albuterol 90 mcg/actuation inhaler Inhale 2 puffs into the lungs every 6 (six) hours as needed for Wheezing. 1 Inhaler 11    aspirin 325 MG tablet Take 1 tablet (325 mg total) by mouth once daily. (Patient taking differently: Take 81 mg by mouth once daily. )  0    atorvastatin (LIPITOR) 40 MG tablet TAKE 1 TABLET BY MOUTH ONCE DAILY 90 tablet 0    blood pressure test kit-large Kit 1 Device by Misc.(Non-Drug; Combo Route) route 2 (two) times daily. 1 each 0    blood sugar diagnostic Strp To check BG 2 times daily, to use with insurance preferred meter 200 strip 3    blood-glucose meter kit To check BG 2 times daily, to use with insurance preferred meter 1 each 0    clobetasol 0.05% (TEMOVATE) 0.05 % Oint Apply topically 2 (two) times daily. 45 g 5    COLCRYS 0.6 mg tablet TAKE 2 TABLETS BY MOUTH AT THE FIRST SIGN OF GOUT FLARE THEN 1 TABLET 1 HOUR LATER 30 tablet 2    diazePAM (VALIUM) 10 MG Tab Take 10 mg by mouth once daily.   0    diclofenac sodium 1 % Gel Apply 2 g topically once daily. 100 g 5    diltiaZEM (CARTIA XT) 240 MG 24 hr capsule Take 1 capsule (240 mg total) by mouth once daily. 90 capsule 1    FLUAD [id number], 65 YR UP,,PF, 45 mcg (15 mcg x 3)/0.5 mL Syrg ADM 0.5ML IM UTD  0    fluticasone (FLONASE) 50 mcg/actuation nasal spray       fluticasone-salmeterol 250-50 mcg/dose (ADVAIR DISKUS) 250-50 mcg/dose diskus inhaler INHALE ONE PUFF INTO LUNGS TWICE DAILY 60 each 2    FLUZONE HIGH-DOSE  2017-18, PF, 180 mcg/0.5 mL vaccine ADM 0.5ML IM UTD  0    gabapentin (NEURONTIN) 600 MG tablet TAKE 1 TABLET BY MOUTH THREE TIMES DAILY 90 tablet 2    lancets Misc To check BG 2 times daily, to use with insurance preferred meter 200 each 3    levocetirizine (XYZAL) 5 MG tablet TAKE ONE TABLET BY MOUTH ONCE DAILY IN THE EVENING 30 tablet 11    lisinopril-hydrochlorothiazide (PRINZIDE,ZESTORETIC) 20-25 mg Tab Take 1 tablet by mouth once daily. 90 tablet 1    methylPREDNISolone (MEDROL DOSEPACK) 4 mg tablet use as directed 1 Package 0     No current facility-administered medications for this visit.        Review of Systems   Constitutional: Negative for activity change, fatigue and unexpected weight change.   HENT: Negative for trouble swallowing and voice change.    Eyes: Negative for photophobia, pain and visual disturbance.   Respiratory: Negative for apnea and shortness of breath.    Cardiovascular: Negative for chest pain and palpitations.   Gastrointestinal: Negative for constipation, nausea and vomiting.   Genitourinary: Negative for difficulty urinating.   Musculoskeletal: Negative for arthralgias, back pain, gait problem, myalgias and neck pain.   Skin: Negative for color change and rash.   Neurological: Positive for weakness and numbness. Negative for dizziness, seizures, syncope, speech difficulty, light-headedness and headaches.   Psychiatric/Behavioral: Negative for agitation, behavioral problems and confusion.       Neurologic Exam     Mental Status   Oriented to person, place, and time.   Registration: recalls 3 of 3 objects.   Attention: normal. Concentration: normal.   Speech: speech is normal   Level of consciousness: alert  Knowledge: good.     Cranial Nerves     CN II   Visual fields full to confrontation.   Right visual field deficit: none  Left visual field deficit: none     CN III, IV, VI   Pupils are equal, round, and reactive to light.  Extraocular motions are normal.   Right pupil: Size:  3 mm. Shape: regular. Accommodation: intact.   Left pupil: Size: 3 mm. Shape: regular. Accommodation: intact.   CN III: no CN III palsy  CN VI: no CN VI palsy  Nystagmus: none   Diplopia: none  Ophthalmoparesis: none  Upgaze: normal  Downgaze: normal  Conjugate gaze: present    CN V   Facial sensation intact.   Right facial sensation deficit: none  Left facial sensation deficit: none    CN VII   Facial expression full, symmetric.   Right facial weakness: none  Left facial weakness: none    CN VIII   CN VIII normal.     CN IX, X   CN IX normal.   CN X normal.   Palate: symmetric    CN XI   CN XI normal.   Right sternocleidomastoid strength: normal  Left sternocleidomastoid strength: normal  Right trapezius strength: normal  Left trapezius strength: normal    CN XII   CN XII normal.   Tongue deviation: none    Motor Exam   Muscle bulk: normal  Overall muscle tone: normal  Right arm tone: normal  Left arm tone: normal  Right leg tone: normal  Left leg tone: normal    Strength   Strength 5/5 except as noted.   Right interossei: 4/5  Left interossei: 4/5    Sensory Exam   Right arm light touch: decreased from fingers  Left arm light touch: decreased from fingers  Right leg light touch: normal  Left leg light touch: normal  Right arm vibration: decreased from fingers  Left arm vibration: decreased from fingers  Right leg vibration: normal  Left leg vibration: normal  Right arm proprioception: normal  Left arm proprioception: normal  Right leg proprioception: normal  Left leg proprioception: normal  Right arm pinprick: decreased from fingers  Left arm pinprick: decreased from fingers  Right leg pinprick: normal  Left leg pinprick: normal  Sensory deficit distribution on right: median  Sensory deficit distribution on left: median    Gait, Coordination, and Reflexes     Gait  Gait: normal    Coordination   Romberg: negative  Finger to nose coordination: normal  Heel to shin coordination: normal  Tandem walking coordination:  "normal    Tremor   Resting tremor: absent    Reflexes   Right brachioradialis: 2+  Left brachioradialis: 2+  Right biceps: 2+  Left biceps: 2+  Right triceps: 2+  Left triceps: 2+  Right patellar: 2+  Left patellar: 2+  Right achilles: 2+  Left achilles: 2+  Right plantar: normal  Left plantar: normal      Physical Exam   Constitutional: He is oriented to person, place, and time. He appears well-developed and well-nourished.   HENT:   Head: Normocephalic and atraumatic.   Eyes: EOM are normal. Pupils are equal, round, and reactive to light.   Cardiovascular: Intact distal pulses.   Pulmonary/Chest: Effort normal. No respiratory distress.   Musculoskeletal: Normal range of motion.   Neurological: He is alert and oriented to person, place, and time. He has a normal Finger-Nose-Finger Test, a normal Heel to Shin Test, a normal Romberg Test and a normal Tandem Gait Test. Gait normal.   Reflex Scores:       Tricep reflexes are 2+ on the right side and 2+ on the left side.       Bicep reflexes are 2+ on the right side and 2+ on the left side.       Brachioradialis reflexes are 2+ on the right side and 2+ on the left side.       Patellar reflexes are 2+ on the right side and 2+ on the left side.       Achilles reflexes are 2+ on the right side and 2+ on the left side.  Skin: Skin is warm and dry.   Psychiatric: He has a normal mood and affect. His speech is normal and behavior is normal.       Vitals:    01/11/19 1419   Temp: (!) 94.1 °F (34.5 °C)   Weight: (!) 146.3 kg (322 lb 8.5 oz)   Height: 5' 9" (1.753 m)       Assessment & Plan:    Problem List Items Addressed This Visit     None      Visit Diagnoses     Cervical radiculopathy    -  Primary    Relevant Orders    MRI Cervical Spine Without Contrast (Completed)    Bilateral carpal tunnel syndrome        Relevant Orders    MRI Cervical Spine Without Contrast (Completed)          Follow-up: Follow-up in about 2 months (around 3/11/2019).     "

## 2019-02-08 ENCOUNTER — CLINICAL SUPPORT (OUTPATIENT)
Dept: REHABILITATION | Facility: HOSPITAL | Age: 69
End: 2019-02-08
Attending: NEUROLOGICAL SURGERY
Payer: MEDICARE

## 2019-02-08 DIAGNOSIS — Z74.09 DECREASED FUNCTIONAL MOBILITY AND ENDURANCE: ICD-10-CM

## 2019-02-08 DIAGNOSIS — R26.89 DECREASED SPINAL MOBILITY: ICD-10-CM

## 2019-02-08 PROCEDURE — 97161 PT EVAL LOW COMPLEX 20 MIN: CPT | Mod: PO
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
## 2019-02-14 NOTE — PROGRESS NOTES
"  Physical Therapy Daily Treatment Note     Name: Bj Pate Jr.  Clinic Number: [id number]    Therapy Diagnosis:   Encounter Diagnoses   Name Primary?    Decreased spinal mobility     Decreased functional mobility and endurance      Physician: Navin Alejo MD    Visit Date: 2/15/2019    Physician Orders: PT Eval and Treat  Evaluation Date: 2/8/19  Authorization Period Expiration: 12/31/19  Plan of Care Certification Period: 5/8/19  Visit #/Visits authorized: 1/12    Time In: 10:00 am  Time Out: 10: 55 am  Total Billable Time: 55 minutes (TE-4)    Precautions: Standard    Subjective     Pt reports: he is not experiencing any neck pain currently, complaining of having tingling in all of his fingers  He was compliant with home exercise program.  Response to previous treatment: no adverse effects  Functional change: none    Pain: 0/10  Location: Cervical spine    Objective     Bj received therapeutic exercises to develop strength, endurance and ROM for 50 minutes including:    Seated upper trap stretch: 3x 20"  Seated levator scap stretch: 3x20"   Seated Scapular retractions : 2 x 10 5" hold  Supine Chin tucks: 2x10 5" hold  Supine Cervical rotations: x 10 B  Thoracic rotations (open books): 15 B  DNF: 2x10  Rows c/ OTB: 2x10  Shoulder extension c/ OTB: 2x10  Bruggers c/ YTB: 2x10  Corner chest stretch : 3 x 30"     Home Exercises Provided and Patient Education Provided     Education provided:   - Continue previous HEP and addition of HEP provided today    Written Home Exercises Provided: yes.  Exercises were reviewed and Bj was able to demonstrate them prior to the end of the session.  Bj demonstrated good  understanding of the education provided.     See EMR under Patient Instructions for exercises provided 2/15/2019.    Assessment     Pt able to progress with exercises today and perform without increased pain or radicular symptoms. Attempted to perform median and ulnar nerve glides, although pt " had difficulty understanding how to perform and was unable to perform correctly despite cueing.   Bj is progressing well towards his goals.   Pt prognosis is Good.     Pt will continue to benefit from skilled outpatient physical therapy to address the deficits listed in the problem list box on initial evaluation, provide pt/family education and to maximize pt's level of independence in the home and community environment.   Pt's spiritual, cultural and educational needs considered and pt agreeable to plan of care and goals.    Anticipated barriers to physical therapy: none    Goals: Short Term GOALS:  Pt agrees with goals set.  1. Patient demonstrates independence with HEP.   2. Patient demonstrates independence with Postural Awareness.   3. Patient demonstrates independence with body mechanics.    Plan     Continue POC. Progress as tolerated.    Radha Hurst, PTA

## 2019-02-14 NOTE — PROGRESS NOTES
Patient, Bj Pate Jr. (MRN #[id number]), presented with a recent Estimated Glumerular Filtration Rate (EGFR) between 30 and 45 consistent with the definition of chronic kidney disease stage 3 - moderate (ICD10 - N18.3).    eGFR if    Date Value Ref Range Status   07/25/2018 41.5 (A) >60 mL/min/1.73 m^2 Final       The patient's chronic kidney disease stage 3 was monitored, evaluated, addressed and/or treated. This addendum to the medical record is made on 02/14/2019.

## 2019-02-15 ENCOUNTER — CLINICAL SUPPORT (OUTPATIENT)
Dept: REHABILITATION | Facility: HOSPITAL | Age: 69
End: 2019-02-15
Attending: NEUROLOGICAL SURGERY
Payer: MEDICARE

## 2019-02-15 DIAGNOSIS — R26.89 DECREASED SPINAL MOBILITY: ICD-10-CM

## 2019-02-15 DIAGNOSIS — Z74.09 DECREASED FUNCTIONAL MOBILITY AND ENDURANCE: ICD-10-CM

## 2019-02-15 PROCEDURE — 97110 THERAPEUTIC EXERCISES: CPT | Mod: PO

## 2019-02-16 DIAGNOSIS — E78.5 DYSLIPIDEMIA: ICD-10-CM

## 2019-02-18 RX ORDER — ATORVASTATIN CALCIUM 40 MG/1
TABLET, FILM COATED ORAL
Qty: 90 TABLET | Refills: 0 | Status: SHIPPED | OUTPATIENT
Start: 2019-02-18 | End: 2019-03-18 | Stop reason: SDUPTHER
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
## 2019-02-20 ENCOUNTER — CLINICAL SUPPORT (OUTPATIENT)
Dept: REHABILITATION | Facility: HOSPITAL | Age: 69
End: 2019-02-20
Attending: NEUROLOGICAL SURGERY
Payer: MEDICARE

## 2019-02-20 DIAGNOSIS — R26.89 DECREASED SPINAL MOBILITY: ICD-10-CM

## 2019-02-20 DIAGNOSIS — Z74.09 DECREASED FUNCTIONAL MOBILITY AND ENDURANCE: ICD-10-CM

## 2019-02-20 PROCEDURE — 97110 THERAPEUTIC EXERCISES: CPT | Mod: PO

## 2019-02-20 NOTE — PROGRESS NOTES
"  Physical Therapy Daily Treatment Note     Name: Bj Pate Jr.  Clinic Number: [id number]    Therapy Diagnosis:   Encounter Diagnoses   Name Primary?    Decreased spinal mobility     Decreased functional mobility and endurance      Physician: Navin Alejo MD    Visit Date: 2/20/2019    Physician Orders: PT Eval and Treat  Evaluation Date: 2/8/19  Authorization Period Expiration: 12/31/19  Plan of Care Certification Period: 5/8/19  Visit #/Visits authorized: 2/12    Time In: 9:03 am  Time Out: 9:52 AM  Total Billable Time: 30 minutes (TE-2)    Precautions: Standard    Subjective     Pt reports: he is still experiencing neck pain and continues to report tingling into the fingers. Patient reports pain is worse L>R.   He was compliant with home exercise program.  Response to previous treatment: no adverse effects  Functional change: none    Pain: 5-6/10  Location: Cervical spine    Objective     Bj received therapeutic exercises to develop strength, endurance and ROM for 45 minutes including:    Seated upper trap stretch: 3x 20"  Seated levator scap stretch: 3x20"   Seated Scapular retractions : 2 x 10 5" hold  Supine Chin tucks: 2x10 5" hold  Supine Cervical rotations: x 10 B  Thoracic rotations (open books): 2 x 10 B  DNF: 2x10 - NP  Rows c/ OTB: 3x10  Shoulder extension c/ OTB: 3x10  Bruggers no resistance: 2x10  Corner chest stretch : 3 x 30"   Supine pec stretch across half foam x 2 minutes   Supine hor abd over half foam 2 x 10     Home Exercises Provided and Patient Education Provided     Education provided:   - Continue previous HEP     Written Home Exercises Provided: yes. - no change   Exercises were reviewed and Bj was able to demonstrate them prior to the end of the session.  Bj demonstrated good  understanding of the education provided.     See EMR under Patient Instructions for exercises provided 2/15/2019.    Assessment     Pt tolerated treatment well today. Patient tolerated " additional exercises noted above in bold well today with no onset of pain. Patient requires cueing for UT and levator Scap stretches for proper form and to decrease shoulder hike. Patient requires cueing throughout most exercises for proper form.    Bj is progressing well towards his goals.   Pt prognosis is Good.     Pt will continue to benefit from skilled outpatient physical therapy to address the deficits listed in the problem list box on initial evaluation, provide pt/family education and to maximize pt's level of independence in the home and community environment.   Pt's spiritual, cultural and educational needs considered and pt agreeable to plan of care and goals.    Anticipated barriers to physical therapy: none    Goals: Short Term GOALS:  Pt agrees with goals set.  1. Patient demonstrates independence with HEP.   2. Patient demonstrates independence with Postural Awareness.   3. Patient demonstrates independence with body mechanics.    Plan     Continue POC. Progress as tolerated.    Flory Schreiber, PTA

## 2019-02-27 ENCOUNTER — CLINICAL SUPPORT (OUTPATIENT)
Dept: REHABILITATION | Facility: HOSPITAL | Age: 69
End: 2019-02-27
Attending: NEUROLOGICAL SURGERY
Payer: MEDICARE

## 2019-02-27 DIAGNOSIS — R26.89 DECREASED SPINAL MOBILITY: ICD-10-CM

## 2019-02-27 DIAGNOSIS — Z74.09 DECREASED FUNCTIONAL MOBILITY AND ENDURANCE: ICD-10-CM

## 2019-02-27 PROCEDURE — 97110 THERAPEUTIC EXERCISES: CPT | Mod: PO

## 2019-02-27 NOTE — PROGRESS NOTES
"  Physical Therapy Daily Treatment Note     Name: Bj Pate Jr.  Clinic Number: [id number]    Therapy Diagnosis:   Encounter Diagnoses   Name Primary?    Decreased spinal mobility     Decreased functional mobility and endurance      Physician: Navin Alejo MD    Visit Date: 2/27/2019    Physician Orders: PT Eval and Treat  Evaluation Date: 2/8/19  Authorization Period Expiration: 12/31/19  Plan of Care Certification Period: 5/8/19  Visit #/Visits authorized: 3/12    Time In: 10:00 am  Time Out: 11:00 am  Total Billable Time: 30 Minutes (TE-2)    Precautions: Standard    Subjective     Pt reports: he feels about the same and continues to complain of tingling in his fingers. He reports that he has been sleeping on his side instead of his back and realized he has less tingling in the morning.   He was compliant with home exercise program.  Response to previous treatment: no adverse effects  Functional change: none    Pain: 4/10  Location: Cervical spine    Objective     Bj received therapeutic exercises to develop strength, endurance and ROM for 60 minutes including:    Supine pec stretch across half foam x 2 minutes   Supine hor abd over half foam 2 x 10 YTB  Supine dowel flexion on half foam: 3x10  Supine serratus punches over half foam c/ dowel: 3x10  SL ER (no weight): 2x10 (cues)  ER walkouts YTB: 2x10 B  Seated upper trap stretch: 3x 20"  Seated levator scap stretch: 3x20"   Seated Scapular retractions : 2 x 10 5" hold  Supine Chin tucks: 2x10 5" hold  Supine Cervical rotations: x 10 B  Thoracic rotations (open books): 2 x 10 B  DNF: 2x10   Rows c/ OTB: 3x10  Shoulder extension c/ OTB: 3x10  Bruggers no resistance: 2x10- NP  Corner chest stretch : 3 x 30"   Thoracic extensions: x20    Home Exercises Provided and Patient Education Provided     Education provided:   - Continue previous HEP     Written Home Exercises Provided: yes. - no change   Exercises were reviewed and Bj was able to demonstrate " them prior to the end of the session.  Bj demonstrated good  understanding of the education provided.     See EMR under Patient Instructions for exercises provided 2/15/2019.    Assessment      Pt able to complete session with no increased pain or symptom provocation. Pt struggles to perform ER correctly and with limited ROM and requires cueing for improved form. Pt was able to complete isometric ER walkouts well and will discontinue active ER due to being unable to perform effectively.   Pt has difficulty c/ performing most exercises with correct form although seems to be improving compared to his last session.   Bj is progressing well towards his goals.   Pt prognosis is Good.     Pt will continue to benefit from skilled outpatient physical therapy to address the deficits listed in the problem list box on initial evaluation, provide pt/family education and to maximize pt's level of independence in the home and community environment.   Pt's spiritual, cultural and educational needs considered and pt agreeable to plan of care and goals.    Anticipated barriers to physical therapy: none    Goals: Short Term GOALS:  Pt agrees with goals set.  1. Patient demonstrates independence with HEP.   2. Patient demonstrates independence with Postural Awareness.   3. Patient demonstrates independence with body mechanics.    Plan     Continue POC. Progress as tolerated.    Radha Hurst, PTA

## 2019-03-01 ENCOUNTER — CLINICAL SUPPORT (OUTPATIENT)
Dept: REHABILITATION | Facility: HOSPITAL | Age: 69
End: 2019-03-01
Attending: NEUROLOGICAL SURGERY
Payer: MEDICARE

## 2019-03-01 DIAGNOSIS — Z74.09 DECREASED FUNCTIONAL MOBILITY AND ENDURANCE: ICD-10-CM

## 2019-03-01 DIAGNOSIS — R26.89 DECREASED SPINAL MOBILITY: ICD-10-CM

## 2019-03-01 PROCEDURE — 97110 THERAPEUTIC EXERCISES: CPT | Mod: PO

## 2019-03-01 NOTE — PROGRESS NOTES
"  Physical Therapy Daily Treatment Note     Name: Bj Pate Jr.  Clinic Number: [id number]    Therapy Diagnosis:   Encounter Diagnoses   Name Primary?    Decreased spinal mobility     Decreased functional mobility and endurance      Physician: Navin Alejo MD    Visit Date: 3/1/2019    Physician Orders: PT Eval and Treat  Evaluation Date: 2/8/19  Authorization Period Expiration: 12/31/19  Plan of Care Certification Period: 5/8/19  Visit #/Visits authorized: 4/12    Time In: 10:00 am  Time Out: 11:00 am  Total Billable Time: 60 minutes (TE-4)    Precautions: Standard    Subjective     Pt reports: he is still experiencing tingling in his fingers. Pt stated he felt a lot better after his last session and feels like the stretches/exercises he does laying over the foam roll give him the most relief. Pt stated he notices he slouches and wants to continue working on exercises for his posture.  He was compliant with home exercise program.  Response to previous treatment: no adverse effects  Functional change: none    Pain: 6/10  Location: Pt stating "its my whole body that aches and my lower back specifically"    Objective     Bj received therapeutic exercises to develop strength, endurance and ROM for 60 minutes including:    Supine pec stretch across half foam x 3 minutes   Supine hor abd over half foam 2 x 10 YTB  Supine dowel flexion on half foam: 3x10 2#  Supine serratus punches over half foam c/ dowel: 3x10 2#  Supine snow angels over half foam roll: x30  Supine Chin tucks: 2x10 5" hold  Supine Cervical rotations: x 10 B  SL ER (no weight): 2x10 (cues)- NP  ER walkouts YTB: 2x10 B  Seated upper trap stretch: 3x 20"  Seated levator scap stretch: 3x20"   Seated Scapular retractions : 2 x 10 5" hold  Thoracic rotations (open books): 2 x 10 B  DNF: 2x10   Rows c/ GTB: 3x10  Shoulder extension c/ GTB: 3x10  Bruggers no resistance: 2x10- NP  Corner chest stretch : 3 x 30"   Seated thoracic extensions: x20  ABC: " "x2 rounds   Scap retractions c/ bolster behind back against wall: x20 5" hold  Alt arm flexion against bolster on wall: 2x10    Home Exercises Provided and Patient Education Provided     Education provided:   - Continue previous HEP     Written Home Exercises Provided: yes. - no change   Exercises were reviewed and Bj was able to demonstrate them prior to the end of the session.  Bj demonstrated good  understanding of the education provided.     See EMR under Patient Instructions for exercises provided 2/15/2019.    Assessment     Pt able to complete session with no increased pain.  Pt continues to require cueing for correct form although improved carryover noted. Pt able to tolerate additional strengthening exercises today with no adverse effects.   Bj is progressing well towards his goals.   Pt prognosis is Good.     Pt will continue to benefit from skilled outpatient physical therapy to address the deficits listed in the problem list box on initial evaluation, provide pt/family education and to maximize pt's level of independence in the home and community environment.   Pt's spiritual, cultural and educational needs considered and pt agreeable to plan of care and goals.    Anticipated barriers to physical therapy: none    Goals: Short Term GOALS:  Pt agrees with goals set.  1. Patient demonstrates independence with HEP.   2. Patient demonstrates independence with Postural Awareness.   3. Patient demonstrates independence with body mechanics.    Plan     Continue POC. Progress as tolerated.    Radha Hurst, PTA   "

## 2019-03-04 ENCOUNTER — CLINICAL SUPPORT (OUTPATIENT)
Dept: REHABILITATION | Facility: HOSPITAL | Age: 69
End: 2019-03-04
Attending: NEUROLOGICAL SURGERY
Payer: MEDICARE

## 2019-03-04 DIAGNOSIS — Z74.09 DECREASED FUNCTIONAL MOBILITY AND ENDURANCE: ICD-10-CM

## 2019-03-04 DIAGNOSIS — R26.89 DECREASED SPINAL MOBILITY: ICD-10-CM

## 2019-03-04 PROCEDURE — 97110 THERAPEUTIC EXERCISES: CPT | Mod: PO

## 2019-03-04 NOTE — PROGRESS NOTES
"  Physical Therapy Daily Treatment Note     Name: Bj Pate Jr.  Clinic Number: [id number]    Therapy Diagnosis:   Encounter Diagnoses   Name Primary?    Decreased spinal mobility     Decreased functional mobility and endurance      Physician: Navin Alejo MD    Visit Date: 3/4/2019    Physician Orders: PT Eval and Treat  Evaluation Date: 2/8/19  Authorization Period Expiration: 12/31/19  Plan of Care Certification Period: 5/8/19  Visit #/Visits authorized: 5/12    Time In: 10:55 am  Time Out: 11:50 am  Total Billable Time: 55 minutes (TE-4)    Precautions: Standard    Subjective     Pt reports: no new complaints today, stating he still experiences tingling and has lower back pain  He was compliant with home exercise program.  Response to previous treatment: no adverse effects  Functional change: none    Pain: 4 /10  Location: lower back    Objective     Bj received therapeutic exercises to develop strength, endurance and ROM for 55 minutes including:    UBE: 6' (3' each way)   Supine pec stretch across half foam x 3 minutes   Supine hor abd over half foam 2 x 10 YTB  Supine dowel flexion on half foam: 3x10 3#  Supine serratus punches over half foam c/ dowel: 3x10 3#  Supine snow angels over half foam roll: x30  Supine Chin tucks: 2x10 5" hold  Supine Cervical rotations: x 10 B  Seated Scapular retractions : 2 x 10 5" hold  Thoracic rotations (open books): 2 x 10 B  DNF: 2x10   Rows c/ GTB: 3x10  Shoulder extension c/ GTB: 3x10  Corner chest stretch : 3 x 30"   Seated thoracic extensions: x20  ABC: x2 rounds   Scap retractions c/ bolster behind back against wall: x20 5" hold  Alt arm flexion against bolster on wall: 2x10    Not performed Today:  Seated upper trap stretch: 3x 20"  Seated levator scap stretch: 3x20"   SL ER (no weight): 2x10 (cues)  ER walkouts YTB: 2x10 B  Bruggers no resistance: 2x10- NP    Home Exercises Provided and Patient Education Provided     Education provided:   - Continue " "previous HEP     Written Home Exercises Provided: yes. - no change   Exercises were reviewed and Bj was able to demonstrate them prior to the end of the session.  Bj demonstrated good  understanding of the education provided.     See EMR under Patient Instructions for exercises provided 2/15/2019.    Assessment     Pt able to complete session with no adverse effects or increased symptoms. Pt continues to require cueing for proper form and to perform exercises in a slow and controlled manner. Pt with subjective report of "feeling better" following session.   Bj is progressing well towards his goals.   Pt prognosis is Good.     Pt will continue to benefit from skilled outpatient physical therapy to address the deficits listed in the problem list box on initial evaluation, provide pt/family education and to maximize pt's level of independence in the home and community environment.   Pt's spiritual, cultural and educational needs considered and pt agreeable to plan of care and goals.    Anticipated barriers to physical therapy: none    Goals: Short Term GOALS:  Pt agrees with goals set.  1. Patient demonstrates independence with HEP.   2. Patient demonstrates independence with Postural Awareness.   3. Patient demonstrates independence with body mechanics.    Plan     Continue POC. Progress as tolerated.    Radha Hurst, PTA   "

## 2019-03-07 ENCOUNTER — CLINICAL SUPPORT (OUTPATIENT)
Dept: REHABILITATION | Facility: HOSPITAL | Age: 69
End: 2019-03-07
Attending: NEUROLOGICAL SURGERY
Payer: MEDICARE

## 2019-03-07 DIAGNOSIS — Z74.09 DECREASED FUNCTIONAL MOBILITY AND ENDURANCE: ICD-10-CM

## 2019-03-07 DIAGNOSIS — R26.89 DECREASED SPINAL MOBILITY: ICD-10-CM

## 2019-03-07 PROCEDURE — 97110 THERAPEUTIC EXERCISES: CPT | Mod: PO

## 2019-03-07 PROCEDURE — G8978 MOBILITY CURRENT STATUS: HCPCS | Mod: CJ,PO

## 2019-03-07 PROCEDURE — G8979 MOBILITY GOAL STATUS: HCPCS | Mod: CI,PO

## 2019-03-07 NOTE — PROGRESS NOTES
"  Physical Therapy Daily Treatment Note     Name: Bj Pate Jr.  Clinic Number: [id number]    Therapy Diagnosis:   Encounter Diagnoses   Name Primary?    Decreased spinal mobility     Decreased functional mobility and endurance      Physician: Navin Alejo MD    Visit Date: 3/7/2019    Physician Orders: PT Eval and Treat  Evaluation Date: 2/8/19  Authorization Period Expiration: 12/31/19  Plan of Care Certification Period: 5/8/19  Visit #/Visits authorized: 6/12    Time In: 11:00 am  Time Out: 12:00 pm  Total Billable Time: 30     Precautions: Standard    Subjective      Pt reports: that he does feel his strength and mobility are improved but his pain has not really changed. He understands that may not decreased much given his medical hx.   Response to previous treatment: no adverse effects  Functional change: none    Pain: 4 /10  Location: lower back    Objective   Posture Alignment: increased thoracic kyphosis, slouched posture, rounded shoulders and forward head     CERVICAL SPINE AROM:   Flexion: 65 was previously 70 with reports of a "chocking sensation"   Extension: 70 was previously 66 with crepitus    Left Sidebend: 45 was previously 35   Right Sidebend: 35 was previously 32    Left Rotation: 68 was 55   Right Rotation: 63 was 54      SEGMENTAL MOBILITY: unable to assess due to body habitus      UPPER EXTREMITY STRENGTH:    Left Right   Shoulder Flexion 4+/5 4+/5   Shoulder Abduction 4+/5 4+/5      Shoulder Internal Rotation 4+/5 4+/5   Shoulder External Rotation 4+/5 4+/5   --#1 26 kg (top) was 20 30 kg (top) was 24         DTR's:    Left Right   Biceps Tendon 2+ 2+   Brachioradialis 2+ 2+   Triceps Tendon 2+ 2+      Dermatomes: Sensation: Light Touch: Intact  Myotomes:  WNL  Palpation: tender to palpation of cervical paraspinals and upper traps B     Bj received therapeutic exercises to develop strength, endurance and ROM for 45 minutes including:    UBE: 6' (3' each way)   Supine pec " "stretch across half foam x 3 minutes   Supine hor abd over half foam 2 x 10 YTB  Supine dowel flexion on half foam: 3x10 3#  Supine serratus punches over half foam c/ dowel: 3x10 3#  Supine snow angels over half foam roll: x30  Supine Chin tucks: 2x10 5" hold  Supine Cervical rotations: x 10 B  Seated Scapular retractions : 2 x 10 5" hold  Thoracic rotations (open books): 2 x 10 B  DNF: 2x10     NOT PERFORMED TODAY  Rows c/ GTB: 3x10  Shoulder extension c/ GTB: 3x10  Corner chest stretch : 3 x 30"   Seated thoracic extensions: x20  ABC: x2 rounds   Scap retractions c/ bolster behind back against wall: x20 5" hold  Alt arm flexion against bolster on wall: 2x10    Home Exercises Provided and Patient Education Provided     Education provided:   - Continue previous HEP     Written Home Exercises Provided: yes. - no change   Exercises were reviewed and Bj was able to demonstrate them prior to the end of the session.  Bj demonstrated good  understanding of the education provided.     See EMR under Patient Instructions for exercises provided 2/15/2019.    Assessment     Pt demos significant improvements in mobility and ROM, as well as some improved mobility in strength. The pt also with slightly improved postural awareness. Will continue to advance core and scapular stabilization exercises to reduce stress on his cervical and upper thoracic spine.   Bj is progressing well towards his goals.   Pt prognosis is Good.     Pt will continue to benefit from skilled outpatient physical therapy to address the deficits listed in the problem list box on initial evaluation, provide pt/family education and to maximize pt's level of independence in the home and community environment.   Pt's spiritual, cultural and educational needs considered and pt agreeable to plan of care and goals.    Anticipated barriers to physical therapy: none    Goals: Short Term GOALS:  Pt agrees with goals set---ALL MET   1. Patient demonstrates " independence with HEP.   2. Patient demonstrates independence with Postural Awareness.   3. Patient demonstrates independence with body mechanics.    Long Term GOALS: 8-12 weeks. Pt agrees with goals set.  1. Patient demonstrates increased cervical and GH joint mobility and ROM to improve tolerance to functional activities pain free--PROGRESSING WELL.   2. Patient demonstrates increased strength BUE's to 4/5 or greater to improve tolerance to functional activities pain free--MET, NEW GOAL 5/5.   3. Patient demonstrates improved overall function per FOTO Neck Survey to 35% Limitation or less--PROGRESSING WELL.     Plan     Continue POC. Progress as tolerated 1 X WEEKLY X 4 WEEKS (3/7/19-4/7/19)    Eliz Burr, PT

## 2019-03-14 ENCOUNTER — CLINICAL SUPPORT (OUTPATIENT)
Dept: REHABILITATION | Facility: HOSPITAL | Age: 69
End: 2019-03-14
Attending: NEUROLOGICAL SURGERY
Payer: MEDICARE

## 2019-03-14 DIAGNOSIS — Z74.09 DECREASED FUNCTIONAL MOBILITY AND ENDURANCE: ICD-10-CM

## 2019-03-14 DIAGNOSIS — R26.89 DECREASED SPINAL MOBILITY: ICD-10-CM

## 2019-03-14 PROCEDURE — 97110 THERAPEUTIC EXERCISES: CPT | Mod: PO

## 2019-03-14 NOTE — PROGRESS NOTES
"  Physical Therapy Daily Treatment Note     Name: Bj Pate Jr.  Clinic Number: [id number]    Therapy Diagnosis:   Encounter Diagnoses   Name Primary?    Decreased spinal mobility     Decreased functional mobility and endurance      Physician: Navin Alejo MD    Visit Date: 3/14/2019    Physician Orders: PT Eval and Treat  Evaluation Date: 2/8/19  Authorization Period Expiration: 12/31/19  Plan of Care Certification Period: 5/8/19  Visit #/Visits authorized: 7/12    Time In: 1:00 pm  Time Out: 2:00 pm  Total Billable Time: 30    Precautions: Standard    Subjective     Pt reports: that he continues to feel ok. He denies any pain after last session.   Response to previous treatment: no adverse effects  Functional change: none    Pain: 4 /10  Location: lower back    Objective       Bj received therapeutic exercises to develop strength, endurance and ROM for 55 minutes including:    UBE: 6' (3' each way)    Supine dowel flexion: 3x10 3#  Supine serratus punches over half foam c/ dowel: 3x10 3#  Supine snow angels over half foam roll: x30  Supine Cervical rotations: x 10 B  Seated breuggers : 2 x 10 5" hold OTB  Thoracic rotations (open books): 3 x 10 B  DNF: 2x10   Standing tband row and ext OTB 2 X 10 each   NOT PERFORMED TODAY  Rows c/ GTB: 3x10  Shoulder extension c/ GTB: 3x10  Corner chest stretch : 3 x 30"   Seated thoracic extensions: x20  ABC: x2 rounds   Scap retractions c/ bolster behind back against wall: x20 5" hold  Alt arm flexion against bolster on wall: 2x10    Home Exercises Provided and Patient Education Provided     Education provided:   - Continue previous HEP     Written Home Exercises Provided: yes. - no change   Exercises were reviewed and Bj was able to demonstrate them prior to the end of the session.  Bj demonstrated good  understanding of the education provided.     See EMR under Patient Instructions for exercises provided 2/15/2019.    Assessment     Pt isaiah improved " postural awareness and endurance today as evidenced by the ability to maintain correct posture without verbal or tactile cuing. The pt also with decreased need for rest breaks between sets. The pt does still demo difficulty and decreased strength with IR/ER activities but is able to activate in the correct muscle groups. Ill continue to advance as tolerated.    Bj is progressing well towards his goals.  Pt prognosis is Good.     Pt will continue to benefit from skilled outpatient physical therapy to address the deficits listed in the problem list box on initial evaluation, provide pt/family education and to maximize pt's level of independence in the home and community environment.   Pt's spiritual, cultural and educational needs considered and pt agreeable to plan of care and goals.    Anticipated barriers to physical therapy: none    Goals: Short Term GOALS:  Pt agrees with goals set.  1. Patient demonstrates independence with HEP.   2. Patient demonstrates independence with Postural Awareness.   3. Patient demonstrates independence with body mechanics.    Plan     Continue POC. Progress as tolerated.    Eliz Burr, PT

## 2019-03-18 DIAGNOSIS — E78.5 DYSLIPIDEMIA: ICD-10-CM

## 2019-03-18 DIAGNOSIS — E11.42 DIABETIC POLYNEUROPATHY ASSOCIATED WITH TYPE 2 DIABETES MELLITUS: ICD-10-CM

## 2019-03-18 RX ORDER — ATORVASTATIN CALCIUM 40 MG/1
TABLET, FILM COATED ORAL
Qty: 90 TABLET | Refills: 0 | Status: SHIPPED | OUTPATIENT
Start: 2019-03-18 | End: 2019-08-19 | Stop reason: SDUPTHER

## 2019-03-18 RX ORDER — GABAPENTIN 600 MG/1
TABLET ORAL
Qty: 90 TABLET | Refills: 2 | Status: SHIPPED | OUTPATIENT
Start: 2019-03-18 | End: 2019-03-20 | Stop reason: SDUPTHER

## 2019-03-20 ENCOUNTER — TELEPHONE (OUTPATIENT)
Dept: FAMILY MEDICINE | Facility: CLINIC | Age: 69
End: 2019-03-20

## 2019-03-20 DIAGNOSIS — E11.42 DIABETIC POLYNEUROPATHY ASSOCIATED WITH TYPE 2 DIABETES MELLITUS: ICD-10-CM

## 2019-03-20 RX ORDER — GABAPENTIN 600 MG/1
600 TABLET ORAL 3 TIMES DAILY
Qty: 90 TABLET | Refills: 2 | Status: SHIPPED | OUTPATIENT
Start: 2019-03-20 | End: 2019-03-21 | Stop reason: SDUPTHER

## 2019-03-20 NOTE — TELEPHONE ENCOUNTER
----- Message from Margarita Mustafa sent at 3/20/2019 10:33 AM CDT -----  Contact: pT    Name of Who is Calling:JEAN TRINIDAD JR. [[id number]]    What is the request in detail:  Patient states he will need Prior Authorization  For gabapentin (NEURONTIN) 600 MG tablet patient states because the dosage was doubled he needs a  PA for Insurance company Please contact to further discuss and advise    Can the clinic reply by [facility]: No    What Number to Call Back if not in [facility]: [phone]

## 2019-03-20 NOTE — TELEPHONE ENCOUNTER
Spoke to patient. States that the neurologist, increased the dosage of his gabapentin and now the pharmacy states he will need a prior authorization. Advised patient to contact ' office and informed them of the PA. States he did and waiting for a call back.

## 2019-03-20 NOTE — TELEPHONE ENCOUNTER
Sent to Dr. Alejo.      ----- Message from Linda Campbell sent at 3/20/2019  9:32 AM CDT -----  Contact: Bj [phone]  Type: RX Refill Request    Who Called: Bj    Refill or New Rx:refill    RX Name and Strength:gabapentin (NEURONTIN) 600 MG tablet    Is this a 30 day or 90 day RX:30 day     Preferred Pharmacy with phone number: [id number] - [place], [place] - [address]    Best Call Back Number:[phone]

## 2019-03-21 ENCOUNTER — CLINICAL SUPPORT (OUTPATIENT)
Dept: REHABILITATION | Facility: HOSPITAL | Age: 69
End: 2019-03-21
Attending: NEUROLOGICAL SURGERY
Payer: MEDICARE

## 2019-03-21 DIAGNOSIS — Z74.09 DECREASED FUNCTIONAL MOBILITY AND ENDURANCE: ICD-10-CM

## 2019-03-21 DIAGNOSIS — R26.89 DECREASED SPINAL MOBILITY: ICD-10-CM

## 2019-03-21 DIAGNOSIS — E11.42 DIABETIC POLYNEUROPATHY ASSOCIATED WITH TYPE 2 DIABETES MELLITUS: ICD-10-CM

## 2019-03-21 PROCEDURE — 97110 THERAPEUTIC EXERCISES: CPT | Mod: PO

## 2019-03-21 NOTE — TELEPHONE ENCOUNTER
Patient states that you told him to take 2 tablets 3 times a day so he needs a new RX sent in.        ----- Message from Linda Campbell sent at 3/21/2019  9:49 AM CDT -----  Contact: Bj [phone]  Type: Patient Call Back    Who called:Bj     What is the request in detail: The patient is calling to notify the staff that his pharmacy will not refill his Rx for gabapentin because it is too early. He would like someone to call him back in regards to this matter.    Best call back number:[phone]

## 2019-03-21 NOTE — PROGRESS NOTES
"  Physical Therapy Daily Treatment Note     Name: jB Pate Jr.  Clinic Number: [id number]    Therapy Diagnosis:   Encounter Diagnoses   Name Primary?    Decreased spinal mobility     Decreased functional mobility and endurance      Physician: Navin Alejo MD    Visit Date: 3/21/2019    Physician Orders: PT Eval and Treat  Evaluation Date: 2/8/19  Authorization Period Expiration: 12/31/19  Plan of Care Certification Period: 5/8/19  Visit #/Visits authorized: 8/12    Time In: 2:00 pm  Time Out: 3:00 pm  Total Billable Time: 30    Precautions: Standard    Subjective     Pt reports: that he feels he is improving some, just slowly.  Response to previous treatment: no adverse effects  Functional change: none    Pain: 4 /10  Location: lower back    Objective       Bj received therapeutic exercises to develop strength, endurance and ROM for 55 minutes including:    UBE: 6' (3' each way)    Supine dowel flexion: 3x10 3#  Supine serratus punches over half foam c/ dowel: 3x10 3#  Supine snow angels over half foam roll: x30  Supine Cervical rotations: x 10 B  Seated breuggers : 2 x 10 5" hold OTB  Thoracic rotations (open books): 3 x 10 B OTB  DNF: 2x10   Standing tband row and ext OTB 2 X 10 each     Home Exercises Provided and Patient Education Provided     Education provided:   - Continue previous HEP     Written Home Exercises Provided: yes. - no change   Exercises were reviewed and Bj was able to demonstrate them prior to the end of the session.  Bj demonstrated good  understanding of the education provided.     See EMR under Patient Instructions for exercises provided 2/15/2019.    Assessment     Pt tolerated all treatment well and continues to demo improving postural endurance. The pt still requires intermittent tactile cuing for initiation of movement but is able to appropriately perform the exercise after that. The pt also with overall improvement in postural awareness in regards to seated " posture. Will continue to advance as tolerated.  Bj is progressing well towards his goals  Pt prognosis is Good.     Pt will continue to benefit from skilled outpatient physical therapy to address the deficits listed in the problem list box on initial evaluation, provide pt/family education and to maximize pt's level of independence in the home and community environment.   Pt's spiritual, cultural and educational needs considered and pt agreeable to plan of care and goals.    Anticipated barriers to physical therapy: none    Goals: Short Term GOALS:  Pt agrees with goals set.  1. Patient demonstrates independence with HEP.   2. Patient demonstrates independence with Postural Awareness.   3. Patient demonstrates independence with body mechanics.    Plan     Continue POC. Progress as tolerated.    Eliz Burr, PT

## 2019-03-22 DIAGNOSIS — J44.9 COPD, MODERATE: ICD-10-CM

## 2019-03-22 RX ORDER — GABAPENTIN 600 MG/1
600 TABLET ORAL 3 TIMES DAILY
Qty: 90 TABLET | Refills: 2 | Status: SHIPPED | OUTPATIENT
Start: 2019-03-22 | End: 2019-03-27 | Stop reason: SDUPTHER

## 2019-03-22 NOTE — TELEPHONE ENCOUNTER
----- Message from Lucy Danielle sent at 3/22/2019 12:05 PM CDT -----  Contact: pt  Can the clinic reply in [facility]:       Please refill the medication(s) listed below. The patient can be reached at this phone number [phone]) once it is called into the pharmacy.      Medication #1ADVAIR DISKUS 250-50 mcg/dose diskus inhaler       Medication #2      Preferred Pharmacy:[facility] on [address]

## 2019-03-23 DIAGNOSIS — J44.9 COPD, MODERATE: ICD-10-CM

## 2019-03-25 RX ORDER — FLUTICASONE PROPIONATE AND SALMETEROL 250; 50 UG/1; UG/1
POWDER RESPIRATORY (INHALATION)
Qty: 60 EACH | Refills: 0 | Status: SHIPPED | OUTPATIENT
Start: 2019-03-25 | End: 2019-07-31 | Stop reason: SDUPTHER

## 2019-03-26 RX ORDER — FLUTICASONE PROPIONATE AND SALMETEROL 250; 50 UG/1; UG/1
POWDER RESPIRATORY (INHALATION)
Qty: 60 EACH | Refills: 2 | Status: SHIPPED | OUTPATIENT
Start: 2019-03-26 | End: 2019-10-14

## 2019-03-27 ENCOUNTER — OFFICE VISIT (OUTPATIENT)
Dept: NEUROLOGY | Facility: CLINIC | Age: 69
End: 2019-03-27
Payer: MEDICARE

## 2019-03-27 VITALS
BODY MASS INDEX: 46.65 KG/M2 | DIASTOLIC BLOOD PRESSURE: 66 MMHG | HEIGHT: 69 IN | SYSTOLIC BLOOD PRESSURE: 144 MMHG | WEIGHT: 315 LBS | HEART RATE: 89 BPM

## 2019-03-27 DIAGNOSIS — Z86.73 HISTORY OF CVA (CEREBROVASCULAR ACCIDENT): Primary | ICD-10-CM

## 2019-03-27 DIAGNOSIS — Z86.79 HISTORY OF CEREBRAL PARENCHYMAL HEMORRHAGE: ICD-10-CM

## 2019-03-27 DIAGNOSIS — E11.42 DIABETIC POLYNEUROPATHY ASSOCIATED WITH TYPE 2 DIABETES MELLITUS: ICD-10-CM

## 2019-03-27 DIAGNOSIS — M54.12 CERVICAL RADICULOPATHY: ICD-10-CM

## 2019-03-27 DIAGNOSIS — E66.01 MORBID OBESITY WITH BMI OF 45.0-49.9, ADULT: ICD-10-CM

## 2019-03-27 PROCEDURE — 99214 PR OFFICE/OUTPT VISIT, EST, LEVL IV, 30-39 MIN: ICD-10-PCS | Mod: S$GLB,,, | Performed by: NEUROLOGICAL SURGERY

## 2019-03-27 PROCEDURE — 99214 OFFICE O/P EST MOD 30 MIN: CPT | Mod: S$GLB,,, | Performed by: NEUROLOGICAL SURGERY

## 2019-03-27 PROCEDURE — 99999 PR PBB SHADOW E&M-EST. PATIENT-LVL IV: ICD-10-PCS | Mod: PBBFAC,,, | Performed by: NEUROLOGICAL SURGERY

## 2019-03-27 PROCEDURE — 3077F PR MOST RECENT SYSTOLIC BLOOD PRESSURE >= 140 MM HG: ICD-10-PCS | Mod: CPTII,S$GLB,, | Performed by: NEUROLOGICAL SURGERY

## 2019-03-27 PROCEDURE — 99999 PR PBB SHADOW E&M-EST. PATIENT-LVL IV: CPT | Mod: PBBFAC,,, | Performed by: NEUROLOGICAL SURGERY

## 2019-03-27 PROCEDURE — 3078F PR MOST RECENT DIASTOLIC BLOOD PRESSURE < 80 MM HG: ICD-10-PCS | Mod: CPTII,S$GLB,, | Performed by: NEUROLOGICAL SURGERY

## 2019-03-27 PROCEDURE — 1101F PR PT FALLS ASSESS DOC 0-1 FALLS W/OUT INJ PAST YR: ICD-10-PCS | Mod: CPTII,S$GLB,, | Performed by: NEUROLOGICAL SURGERY

## 2019-03-27 PROCEDURE — 3077F SYST BP >= 140 MM HG: CPT | Mod: CPTII,S$GLB,, | Performed by: NEUROLOGICAL SURGERY

## 2019-03-27 PROCEDURE — 99499 RISK ADDL DX/OHS AUDIT: ICD-10-PCS | Mod: S$GLB,,, | Performed by: NEUROLOGICAL SURGERY

## 2019-03-27 PROCEDURE — 1101F PT FALLS ASSESS-DOCD LE1/YR: CPT | Mod: CPTII,S$GLB,, | Performed by: NEUROLOGICAL SURGERY

## 2019-03-27 PROCEDURE — 3044F PR MOST RECENT HEMOGLOBIN A1C LEVEL <7.0%: ICD-10-PCS | Mod: CPTII,S$GLB,, | Performed by: NEUROLOGICAL SURGERY

## 2019-03-27 PROCEDURE — 99499 UNLISTED E&M SERVICE: CPT | Mod: S$GLB,,, | Performed by: NEUROLOGICAL SURGERY

## 2019-03-27 PROCEDURE — 3078F DIAST BP <80 MM HG: CPT | Mod: CPTII,S$GLB,, | Performed by: NEUROLOGICAL SURGERY

## 2019-03-27 PROCEDURE — 3044F HG A1C LEVEL LT 7.0%: CPT | Mod: CPTII,S$GLB,, | Performed by: NEUROLOGICAL SURGERY

## 2019-03-27 RX ORDER — GABAPENTIN 600 MG/1
600 TABLET ORAL 3 TIMES DAILY
Qty: 90 TABLET | Refills: 11 | Status: SHIPPED | OUTPATIENT
Start: 2019-03-27 | End: 2020-05-26

## 2019-03-30 NOTE — PROGRESS NOTES
Chief Complaint   Patient presents with    Follow-up         Bj Pate Jr. is a 69 y.o. right handed male referred by Dr. Lombard for evaluation of bilateral numbness, tingling, weakness. This is evaluated as a personal injury. The onset of symptoms was gradual, starting about several years ago The pain is described as numbing, shooting and tingling.   Symptoms occur continuously and last several minutes.  He has had night time symptoms.  Restricted activities include: repetitive use pattern, limited  activities. He has not had Physical Therapy for these symptoms.  The pain is relieved by rest. His work is retired and he has not missed work. He is a retired  and worked for years on the road. He had began to notice symptoms only at night, but has gradually began to notice that the symptoms are coming more frequently during the day and when he has been at rest as well. holding his arm in any one position has also triggered his symptoms.    Interval history  01/24/2019  He presents to clinic for follow-up after having MRI to evaluate his symptoms numbness in the arms and hands.  The patient has been having ongoing symptoms seem to be more bothersome every week.  The patient has not had any change in strength in his arms, but has had weakness that seems to be stagnant over the last several.  He has been taking medications as directed but has not noticed much if any improvement in his symptoms.  Patient says that he does not want to continue to get worse but admits that he would be reluctant to discuss any surgical options at this point.    03/27/2019  He continues to feel like the pain has been getting worse in the neck and down arm.  This has caused increasing difficulty with the ability to use the hands and arms and he feels like his weakness has been progressing as well.  The patient says that his medications and seemed to provide some benefit, but has not been enough to make comfortable.  He  would like to discuss other options, but again reiterates that he does not feel like he would be a good surgical candidate and therefore would not like to have any neurosurgical opinion at this time.  He is of interested in discussed interventional procedures which could help to relieve or alleviate his pain.    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        PAST SURGICAL HISTORY:  Past Surgical History:   Procedure Laterality Date    NO PAST SURGERIES         SOCIAL HISTORY:  Social History     Socioeconomic History    Marital status:      Spouse name: Not on file    Number of children: 3    Years of education: Not on file    Highest education level: Not on file   Occupational History    Occupation:    Social Needs    Financial resource strain: Not on file    Food insecurity:     Worry: Not on file     Inability: Not on file    Transportation needs:     Medical: Not on file     Non-medical: Not on file   Tobacco Use    Smoking status: Never Smoker    Smokeless tobacco: Never Used   Substance and Sexual Activity    Alcohol use: Yes     Alcohol/week: 0.0 oz     Comment: occacionally    Drug use: No    Sexual activity: Not on file   Lifestyle    Physical activity:     Days per week: Not on file     Minutes per session: Not on file    Stress: Not on file   Relationships    Social connections:      Talks on phone: Not on file     Gets together: Not on file     Attends Cheondoism service: Not on file     Active member of club or organization: Not on file     Attends meetings of clubs or organizations: Not on file     Relationship status: Not on file    Intimate partner violence:     Fear of current or ex partner: Not on file     Emotionally abused: Not on file     Physically abused: Not on file     Forced sexual activity: Not on file   Other Topics Concern    Not on file   Social History Narrative    Not on file       FAMILY HISTORY:  Family History   Problem Relation Age of Onset    No Known Problems Mother     No Known Problems Father     Hypertension Unknown     Diabetes Unknown     Diabetes Paternal Grandfather     Heart disease Paternal Grandfather     No Known Problems Sister     No Known Problems Brother     No Known Problems Maternal Aunt     No Known Problems Maternal Uncle     No Known Problems Paternal Aunt     No Known Problems Paternal Uncle     No Known Problems Maternal Grandmother     No Known Problems Maternal Grandfather     No Known Problems Paternal Grandmother     Amblyopia Neg Hx     Blindness Neg Hx     Cancer Neg Hx     Cataracts Neg Hx     Glaucoma Neg Hx     Macular degeneration Neg Hx     Retinal detachment Neg Hx     Strabismus Neg Hx     Stroke Neg Hx     Thyroid disease Neg Hx        ALLERGIES AND MEDICATIONS: updated and reviewed.  Review of patient's allergies indicates:   Allergen Reactions    Tomato (solanum lycopersicum) Hives    Naproxen Hives    Shrimp Other (See Comments)     Current Outpatient Medications   Medication Sig Dispense Refill    acetaminophen-codeine 300-30mg (TYLENOL #3) 300-30 mg Tab as needed.       albuterol 90 mcg/actuation inhaler Inhale 2 puffs into the lungs every 6 (six) hours as needed for Wheezing. 1 Inhaler 11    aspirin 325 MG tablet Take 1 tablet (325 mg total) by mouth once daily. (Patient taking differently: Take  81 mg by mouth once daily. )  0    atorvastatin (LIPITOR) 40 MG tablet TAKE 1 TABLET BY MOUTH ONCE DAILY 90 tablet 0    blood pressure test kit-large Kit 1 Device by Misc.(Non-Drug; Combo Route) route 2 (two) times daily. 1 each 0    blood sugar diagnostic Strp To check BG 2 times daily, to use with insurance preferred meter 200 strip 3    blood-glucose meter kit To check BG 2 times daily, to use with insurance preferred meter 1 each 0    clobetasol 0.05% (TEMOVATE) 0.05 % Oint Apply topically 2 (two) times daily. 45 g 5    COLCRYS 0.6 mg tablet TAKE 2 TABLETS BY MOUTH AT THE FIRST SIGN OF GOUT FLARE THEN 1 TABLET 1 HOUR LATER 30 tablet 2    diazePAM (VALIUM) 10 MG Tab Take 10 mg by mouth once daily.   0    diclofenac sodium 1 % Gel Apply 2 g topically once daily. 100 g 5    diltiaZEM (CARTIA XT) 240 MG 24 hr capsule Take 1 capsule (240 mg total) by mouth once daily. 90 capsule 1    FLUAD 7972-8761, 65 YR UP,,PF, 45 mcg (15 mcg x 3)/0.5 mL Syrg ADM 0.5ML IM UTD  0    fluticasone (FLONASE) 50 mcg/actuation nasal spray       fluticasone-salmeterol diskus inhaler 250-50 mcg INHALE ONE PUFF INTO LUNGS TWICE DAILY 60 each 2    fluticasone-salmeterol diskus inhaler 250-50 mcg INHALE 1 PUFF INTO LUNGS TWICE DAILY 60 each 0    FLUZONE HIGH-DOSE 2017-18, PF, 180 mcg/0.5 mL vaccine ADM 0.5ML IM UTD  0    gabapentin (NEURONTIN) 600 MG tablet Take 1 tablet (600 mg total) by mouth 3 (three) times daily. 90 tablet 11    lancets Misc To check BG 2 times daily, to use with insurance preferred meter 200 each 3    levocetirizine (XYZAL) 5 MG tablet TAKE ONE TABLET BY MOUTH ONCE DAILY IN THE EVENING 30 tablet 11    lisinopril-hydrochlorothiazide (PRINZIDE,ZESTORETIC) 20-25 mg Tab Take 1 tablet by mouth once daily. 90 tablet 1    methylPREDNISolone (MEDROL DOSEPACK) 4 mg tablet use as directed 1 Package 0     No current facility-administered medications for this visit.        Review of Systems   Constitutional:  Negative for activity change, fatigue and unexpected weight change.   HENT: Negative for trouble swallowing and voice change.    Eyes: Negative for photophobia, pain and visual disturbance.   Respiratory: Negative for apnea and shortness of breath.    Cardiovascular: Negative for chest pain and palpitations.   Gastrointestinal: Negative for constipation, nausea and vomiting.   Genitourinary: Negative for difficulty urinating.   Musculoskeletal: Negative for arthralgias, back pain, gait problem, myalgias and neck pain.   Skin: Negative for color change and rash.   Neurological: Positive for weakness and numbness. Negative for dizziness, seizures, syncope, speech difficulty, light-headedness and headaches.   Psychiatric/Behavioral: Negative for agitation, behavioral problems and confusion.       Neurologic Exam     Mental Status   Oriented to person, place, and time.   Registration: recalls 3 of 3 objects.   Attention: normal. Concentration: normal.   Speech: speech is normal   Level of consciousness: alert  Knowledge: good.     Cranial Nerves     CN II   Visual fields full to confrontation.   Right visual field deficit: none  Left visual field deficit: none     CN III, IV, VI   Pupils are equal, round, and reactive to light.  Extraocular motions are normal.   Right pupil: Size: 3 mm. Shape: regular. Accommodation: intact.   Left pupil: Size: 3 mm. Shape: regular. Accommodation: intact.   CN III: no CN III palsy  CN VI: no CN VI palsy  Nystagmus: none   Diplopia: none  Ophthalmoparesis: none  Upgaze: normal  Downgaze: normal  Conjugate gaze: present    CN V   Facial sensation intact.   Right facial sensation deficit: none  Left facial sensation deficit: none    CN VII   Facial expression full, symmetric.   Right facial weakness: none  Left facial weakness: none    CN VIII   CN VIII normal.     CN IX, X   CN IX normal.   CN X normal.   Palate: symmetric    CN XI   CN XI normal.   Right sternocleidomastoid strength:  normal  Left sternocleidomastoid strength: normal  Right trapezius strength: normal  Left trapezius strength: normal    CN XII   CN XII normal.   Tongue deviation: none    Motor Exam   Muscle bulk: normal  Overall muscle tone: normal  Right arm tone: normal  Left arm tone: normal  Right leg tone: normal  Left leg tone: normal    Strength   Strength 5/5 except as noted.   Right interossei: 4/5  Left interossei: 4/5    Sensory Exam   Right arm light touch: decreased from fingers  Left arm light touch: decreased from fingers  Right leg light touch: normal  Left leg light touch: normal  Right arm vibration: decreased from fingers  Left arm vibration: decreased from fingers  Right leg vibration: normal  Left leg vibration: normal  Right arm proprioception: normal  Left arm proprioception: normal  Right leg proprioception: normal  Left leg proprioception: normal  Right arm pinprick: decreased from fingers  Left arm pinprick: decreased from fingers  Right leg pinprick: normal  Left leg pinprick: normal  Sensory deficit distribution on right: median  Sensory deficit distribution on left: median    Gait, Coordination, and Reflexes     Gait  Gait: normal    Coordination   Romberg: negative  Finger to nose coordination: normal  Heel to shin coordination: normal  Tandem walking coordination: normal    Tremor   Resting tremor: absent    Reflexes   Right brachioradialis: 2+  Left brachioradialis: 2+  Right biceps: 2+  Left biceps: 2+  Right triceps: 2+  Left triceps: 2+  Right patellar: 2+  Left patellar: 2+  Right achilles: 2+  Left achilles: 2+  Right plantar: normal  Left plantar: normal      Physical Exam   Constitutional: He is oriented to person, place, and time. He appears well-developed and well-nourished.   HENT:   Head: Normocephalic and atraumatic.   Eyes: Pupils are equal, round, and reactive to light. EOM are normal.   Cardiovascular: Intact distal pulses.   Pulmonary/Chest: Effort normal. No respiratory distress.  "  Musculoskeletal: Normal range of motion.   Neurological: He is alert and oriented to person, place, and time. He has a normal Finger-Nose-Finger Test, a normal Heel to Shin Test, a normal Romberg Test and a normal Tandem Gait Test. Gait normal.   Reflex Scores:       Tricep reflexes are 2+ on the right side and 2+ on the left side.       Bicep reflexes are 2+ on the right side and 2+ on the left side.       Brachioradialis reflexes are 2+ on the right side and 2+ on the left side.       Patellar reflexes are 2+ on the right side and 2+ on the left side.       Achilles reflexes are 2+ on the right side and 2+ on the left side.  Skin: Skin is warm and dry.   Psychiatric: He has a normal mood and affect. His speech is normal and behavior is normal.       Vitals:    03/27/19 1400   BP: (!) 144/66   BP Location: Right arm   Patient Position: Sitting   BP Method: Large (Automatic)   Pulse: 89   Weight: (!) 151.8 kg (334 lb 10.5 oz)   Height: 5' 9" (1.753 m)       Assessment & Plan:    Problem List Items Addressed This Visit     Morbid obesity with BMI of 45.0-49.9, adult    History of CVA (cerebrovascular accident) - Primary    History of cerebral parenchymal hemorrhage    Diabetic polyneuropathy associated with type 2 diabetes mellitus    Relevant Medications    gabapentin (NEURONTIN) 600 MG tablet      Other Visit Diagnoses     Cervical radiculopathy        Relevant Orders    Ambulatory referral to Pain Clinic          Follow-up: Follow up in about 3 months (around 6/27/2019).   More than 50% of this 25 minute encounter was spent in counseling and coordinating care of cervical radiculopathy    "

## 2019-04-03 ENCOUNTER — TELEPHONE (OUTPATIENT)
Dept: FAMILY MEDICINE | Facility: CLINIC | Age: 69
End: 2019-04-03

## 2019-04-03 NOTE — TELEPHONE ENCOUNTER
----- Message from Janett Kaiser sent at 4/3/2019 11:30 AM CDT -----  Contact: trip  Name of Who is Calling: trip      What is the request in detail:Patient is requesting a call back he states  his insurance will no longer cover his  fluticasone medication he wants to see if something else cam be called in       Can the clinic reply by [facility]: no      What Number to Call Back if not in [facility]: [phone]

## 2019-04-11 ENCOUNTER — OFFICE VISIT (OUTPATIENT)
Dept: PAIN MEDICINE | Facility: CLINIC | Age: 69
End: 2019-04-11
Payer: MEDICARE

## 2019-04-11 VITALS
HEART RATE: 73 BPM | SYSTOLIC BLOOD PRESSURE: 141 MMHG | OXYGEN SATURATION: 99 % | BODY MASS INDEX: 46.65 KG/M2 | WEIGHT: 315 LBS | RESPIRATION RATE: 18 BRPM | HEIGHT: 69 IN | DIASTOLIC BLOOD PRESSURE: 68 MMHG

## 2019-04-11 DIAGNOSIS — M50.30 DDD (DEGENERATIVE DISC DISEASE), CERVICAL: Primary | ICD-10-CM

## 2019-04-11 DIAGNOSIS — M47.22 OSTEOARTHRITIS OF SPINE WITH RADICULOPATHY, CERVICAL REGION: ICD-10-CM

## 2019-04-11 DIAGNOSIS — M51.36 DDD (DEGENERATIVE DISC DISEASE), LUMBAR: ICD-10-CM

## 2019-04-11 PROCEDURE — 1101F PR PT FALLS ASSESS DOC 0-1 FALLS W/OUT INJ PAST YR: ICD-10-PCS | Mod: CPTII,S$GLB,, | Performed by: PAIN MEDICINE

## 2019-04-11 PROCEDURE — 3077F PR MOST RECENT SYSTOLIC BLOOD PRESSURE >= 140 MM HG: ICD-10-PCS | Mod: CPTII,S$GLB,, | Performed by: PAIN MEDICINE

## 2019-04-11 PROCEDURE — 1101F PT FALLS ASSESS-DOCD LE1/YR: CPT | Mod: CPTII,S$GLB,, | Performed by: PAIN MEDICINE

## 2019-04-11 PROCEDURE — 99204 PR OFFICE/OUTPT VISIT, NEW, LEVL IV, 45-59 MIN: ICD-10-PCS | Mod: S$GLB,,, | Performed by: PAIN MEDICINE

## 2019-04-11 PROCEDURE — 3078F DIAST BP <80 MM HG: CPT | Mod: CPTII,S$GLB,, | Performed by: PAIN MEDICINE

## 2019-04-11 PROCEDURE — 99204 OFFICE O/P NEW MOD 45 MIN: CPT | Mod: S$GLB,,, | Performed by: PAIN MEDICINE

## 2019-04-11 PROCEDURE — 99999 PR PBB SHADOW E&M-EST. PATIENT-LVL III: CPT | Mod: PBBFAC,,, | Performed by: PAIN MEDICINE

## 2019-04-11 PROCEDURE — 3078F PR MOST RECENT DIASTOLIC BLOOD PRESSURE < 80 MM HG: ICD-10-PCS | Mod: CPTII,S$GLB,, | Performed by: PAIN MEDICINE

## 2019-04-11 PROCEDURE — 99999 PR PBB SHADOW E&M-EST. PATIENT-LVL III: ICD-10-PCS | Mod: PBBFAC,,, | Performed by: PAIN MEDICINE

## 2019-04-11 PROCEDURE — 3077F SYST BP >= 140 MM HG: CPT | Mod: CPTII,S$GLB,, | Performed by: PAIN MEDICINE
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
## 2019-04-11 NOTE — LETTER
April 11, 2019      Navin Alejo MD  [address] [facility] [address]  [address]  [facility] [address]           [facility]  [address] [facility] [address]  Phone: [phone]  Fax: [phone]          Patient: Bj Pate Jr.   MR Number: [id number]   YOB: 1950   Date of Visit: 4/11/2019       Dear Dr. Navin Alejo:    Thank you for referring Bj Pate to me for evaluation. Attached you will find relevant portions of my assessment and plan of care.    If you have questions, please do not hesitate to call me. I look forward to following Bj Pate along with you.    Sincerely,    Dariel Doan Jr., MD    Enclosure  CC:  No Recipients    If you would like to receive this communication electronically, please contact [email or web address] or [phone] to request more information on [facility] access.    For providers and/or their staff who would like to refer a patient to [facility], please contact us through our one-stop-shop provider referral line, [facility], at [phone].    If you feel you have received this communication in error or would no longer like to receive these types of communications, please e-mail [email or web address]
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
## 2019-04-12 NOTE — PROGRESS NOTES
Mr. Pate will be scheduled for C7-T1 HARVEY on 05/02/2019.  Reviewed the pre-procedure instructions listed below with him- copy also provided.  Instructed patient to notify clinic if he begins feeling ill, runs a fever, is prescribed antibiotics, and/or has any outpatient procedures within the two weeks leading up to this procedure.  Instructed patient to report to [facility], 2nd Floor Endoscopy Registration Desk.  All questions answered- patient verbalized understanding. Request to hold ASA x 7 days will be sent to Dr. Lombard.    Day of Procedure  - Ensure you have obtained an arrival time from the Pain Management Staff  o Procedure Area will call 1-3 days in advance with your arrival time.  Please check any voicemails received.  o If you arrive past your scheduled procedure time, you may be asked to reschedule your procedure.  - Ensure you have a  with you to remain present throughout your procedure for your safety.  o If you arrive without a responsible adult to stay with you and drive you home, you may be asked to reschedule your procedure.  - Take all of your prescribed medications (exceptions noted below) with a small amount of water  - This is NOT a fasting procedure, you may have a light meal before coming.  - Wear comfortable clothing (loose fitting pants).  - You may wear glasses, dentures, contact lenses, and/or hearing aids. Please remove all jewelry and metal hairpins.  - Notify the nurse during the intake process if you are allergic to any medications, if you are diabetic, or if you are not feeling well  - Contact the Pain Management Clinic with the following:  o A fever greater than 100° (degrees)   o Feel ill, have any type of infection, or are taking antibiotics now or within the two (2) weeks leading up to this procedure  o Have any outpatient procedures within the two (2) weeks leading up to this procedure (colonoscopy, major dental work, etc.)    IF you are taking blood  thinners: Only upon receiving clearance and notification from the Pain Management Department  7 Days Prior to Your Procedure:  - Stop taking Plavix/Clopridogrel, Effient/Prasugel, ASA  5 Days Prior to Your Procedure:  - Stop taking Coumadin/Warfarin.  An INR may be drawn prior to your procedure.  If labs are required, you will need to arrive earlier than your scheduled arrival time.  - Stop taking Pradaxa/Dabigatra,  Brilinta/ Ticagrelor  3 Days Prior to Your Procedure:  - Stop taking Xarelto/Rivaroxaban, Eliquis/Apixaban, Aggrenox/Dipyridamole, Reopro/Abciximab

## 2019-04-24 ENCOUNTER — TELEPHONE (OUTPATIENT)
Dept: PAIN MEDICINE | Facility: CLINIC | Age: 69
End: 2019-04-24

## 2019-04-24 DIAGNOSIS — M1A.09X1 IDIOPATHIC CHRONIC GOUT OF MULTIPLE SITES WITH TOPHUS: ICD-10-CM

## 2019-04-24 NOTE — TELEPHONE ENCOUNTER
----- Message from Jaylene Walker LPN sent at 4/12/2019  9:13 AM CDT -----  Regarding: FW: Clearance to Hold ASA      ----- Message -----  From: Azikiwe K. Lombard, MD  Sent: 4/12/2019   9:03 AM  To: Jaylene Walker LPN  Subject: RE: Clearance to Hold ASA                        Advise that patient is cleared to stop ASA for 7 days.  -Azikiwe K. Lombard, MD   ----- Message -----  From: Jaylene Walker LPN  Sent: 4/12/2019   8:42 AM  To: Azikiwe K. Lombard, MD  Subject: FW: Clearance to Hold ASA                           ----- Message -----  From: Angela Sheppard RN  Sent: 4/12/2019   8:39 AM  To: Lombard Azikiwe K Staff  Subject: Clearance to Hold ASA                            Dr. Lombard,    Mr. Pate is scheduled to have a C7-T1 HARVEY on 05/02/2019.  Dr. Doan is requesting that the patient stop taking ASA seven days prior to this procedure.  Please indicate whether or not he is able to stop taking the medication listed above.  Feel free to contact the clinic with any questions or concerns you may have.      Thank you in advance for your time and expertise,    JUANCHO Mascorro, RN

## 2019-04-25 ENCOUNTER — TELEPHONE (OUTPATIENT)
Dept: PAIN MEDICINE | Facility: CLINIC | Age: 69
End: 2019-04-25

## 2019-04-25 NOTE — TELEPHONE ENCOUNTER
Message left reminding patient that he is to begin holding his ASA starting today, and will resume following Pain Management procedure next week.  Asked that he call clinic to confirm message- phone number included.

## 2019-04-26 ENCOUNTER — TELEPHONE (OUTPATIENT)
Dept: FAMILY MEDICINE | Facility: CLINIC | Age: 69
End: 2019-04-26

## 2019-04-26 DIAGNOSIS — M10.9 GOUT, UNSPECIFIED CAUSE, UNSPECIFIED CHRONICITY, UNSPECIFIED SITE: Primary | ICD-10-CM

## 2019-04-26 RX ORDER — COLCHICINE 0.6 MG/1
TABLET, FILM COATED ORAL
Qty: 30 TABLET | Refills: 2 | Status: ON HOLD | OUTPATIENT
Start: 2019-04-26 | End: 2020-03-02 | Stop reason: SDUPTHER

## 2019-04-26 RX ORDER — PREDNISONE 20 MG/1
40 TABLET ORAL DAILY
Qty: 10 TABLET | Refills: 0 | Status: SHIPPED | OUTPATIENT
Start: 2019-04-26 | End: 2019-05-01

## 2019-04-26 NOTE — TELEPHONE ENCOUNTER
----- Message from Maria Luisa Anna sent at 4/26/2019  9:43 AM CDT -----  Contact: Self/  [phone]  Type: RX Refill Request    Who Called:   Patient    Refill or New Rx:  Refill    RX Name and Strength:  COLCRYS 0.6 mg tablet    How is the patient currently taking it? (ex. 1XDay): 2X day    Is this a 30 day or 90 day RX:  30 day    Preferred Pharmacy with phone number:  [id number] - [place], [place] - [address]    Local or Mail Order:  :   Local      Best Call Back Number:  [phone]

## 2019-04-26 NOTE — TELEPHONE ENCOUNTER
----- Message from Margarita Walker sent at 4/26/2019  1:27 PM CDT -----  Contact: [facility]  Type:  Pharmacy Calling to Clarify an RX    Name of Caller: [facility]    Pharmacy Name: [facility]    Prescription Name: COLCRYS 0.6 mg tablet 30 tablet   CARTIA  mg 24 hr capsule 30 capsule     What do they need to clarify? [facility] stated there is a drug interaction    Best Call Back Number: [phone]    Additional Information:

## 2019-04-26 NOTE — TELEPHONE ENCOUNTER
Cancel colcrys at pharmacy due to reaction  Call pt and verify if having active gout symptoms. Will need alternate therapy if so

## 2019-04-26 NOTE — TELEPHONE ENCOUNTER
Patient states he is having symptoms at this time. He doesn't understand why they are talking about a drug interaction when he has been taking both of them for a very long time. Would like to know what else can be sent or will be sent?Please advise.

## 2019-04-28 DIAGNOSIS — M15.9 PRIMARY OSTEOARTHRITIS INVOLVING MULTIPLE JOINTS: ICD-10-CM

## 2019-04-29 ENCOUNTER — TELEPHONE (OUTPATIENT)
Dept: FAMILY MEDICINE | Facility: CLINIC | Age: 69
End: 2019-04-29

## 2019-04-29 NOTE — TELEPHONE ENCOUNTER
Spoke with patient discuss about new medication was sent to pharmacy for him to replace Colcrys also was told as noted per EV and another encounter , patient verbalized understand .

## 2019-04-29 NOTE — TELEPHONE ENCOUNTER
----- Message from Larisa Garcia sent at 4/29/2019  2:30 PM CDT -----  Contact: self  ..Type: Patient Call Back    Who called:pt     What is the request in detail: pt requesting a call back from Carolyn regarding medications she called him about today. He would not disclose additional details.     Can the clinic reply by [facility]? No     Would the patient rather a call back or a response via [facility]? Call back     Best call back number: [phone]

## 2019-04-29 NOTE — TELEPHONE ENCOUNTER
----- Message from Larisa Garcia sent at 4/29/2019  2:30 PM CDT -----  Contact: self  ..Type: Patient Call Back    Who called:pt     What is the request in detail: pt requesting a call back from Carolyn regarding medications she called him about today. He would not disclose additional details.     Can the clinic reply by [facility]? No     Would the patient rather a call back or a response via [facility]? Call back     Best call back number: [phone]

## 2019-04-29 NOTE — TELEPHONE ENCOUNTER
Attempt to contact patient. No answer. Left message notifying of medication sent to the pharmacy and per CARIDAD Jimenez to monitor glucose closely due to steroids increasing sugar levels.

## 2019-05-02 ENCOUNTER — HOSPITAL ENCOUNTER (OUTPATIENT)
Facility: HOSPITAL | Age: 69
Discharge: HOME | End: 2019-05-02
Attending: PAIN MEDICINE | Admitting: PAIN MEDICINE
Payer: MEDICARE

## 2019-05-02 VITALS
TEMPERATURE: 98 F | SYSTOLIC BLOOD PRESSURE: 152 MMHG | OXYGEN SATURATION: 97 % | HEART RATE: 76 BPM | RESPIRATION RATE: 18 BRPM | DIASTOLIC BLOOD PRESSURE: 72 MMHG

## 2019-05-02 DIAGNOSIS — M54.12 CERVICAL RADICULOPATHY: Primary | ICD-10-CM

## 2019-05-02 PROCEDURE — 62320 NJX INTERLAMINAR CRV/THRC: CPT | Performed by: PAIN MEDICINE

## 2019-05-02 PROCEDURE — 25000003 PHARM REV CODE 250

## 2019-05-02 PROCEDURE — 25000003 PHARM REV CODE 250: Performed by: PAIN MEDICINE

## 2019-05-02 PROCEDURE — 62321 PR INJ CERV/THORAC, W/GUIDANCE: ICD-10-PCS | Mod: ,,, | Performed by: PAIN MEDICINE

## 2019-05-02 PROCEDURE — 25500020 PHARM REV CODE 255: Performed by: PAIN MEDICINE

## 2019-05-02 PROCEDURE — 62321 NJX INTERLAMINAR CRV/THRC: CPT | Performed by: PAIN MEDICINE

## 2019-05-02 PROCEDURE — 62321 NJX INTERLAMINAR CRV/THRC: CPT | Mod: ,,, | Performed by: PAIN MEDICINE

## 2019-05-02 PROCEDURE — 63600175 PHARM REV CODE 636 W HCPCS: Performed by: PAIN MEDICINE

## 2019-05-02 RX ORDER — LIDOCAINE HYDROCHLORIDE 20 MG/ML
INJECTION, SOLUTION INFILTRATION; PERINEURAL
Status: DISCONTINUED
Start: 2019-05-02 | End: 2019-05-02 | Stop reason: HOSPADM

## 2019-05-02 RX ORDER — LIDOCAINE HYDROCHLORIDE 10 MG/ML
1 INJECTION, SOLUTION EPIDURAL; INFILTRATION; INTRACAUDAL; PERINEURAL ONCE
Status: COMPLETED | OUTPATIENT
Start: 2019-05-02 | End: 2019-05-02

## 2019-05-02 RX ORDER — ALPRAZOLAM 0.5 MG/1
1 TABLET, ORALLY DISINTEGRATING ORAL ONCE AS NEEDED
Status: COMPLETED | OUTPATIENT
Start: 2019-05-02 | End: 2019-05-02

## 2019-05-02 RX ORDER — ALPRAZOLAM 0.5 MG/1
TABLET, ORALLY DISINTEGRATING ORAL
Status: COMPLETED
Start: 2019-05-02 | End: 2019-05-02

## 2019-05-02 RX ORDER — DEXAMETHASONE SODIUM PHOSPHATE 10 MG/ML
INJECTION INTRAMUSCULAR; INTRAVENOUS
Status: DISCONTINUED
Start: 2019-05-02 | End: 2019-05-02 | Stop reason: HOSPADM

## 2019-05-02 RX ORDER — LIDOCAINE HYDROCHLORIDE 20 MG/ML
10 INJECTION, SOLUTION INFILTRATION; PERINEURAL ONCE
Status: COMPLETED | OUTPATIENT
Start: 2019-05-02 | End: 2019-05-02

## 2019-05-02 RX ORDER — DICLOFENAC SODIUM 10 MG/G
GEL TOPICAL
Qty: 300 G | Refills: 0 | Status: SHIPPED | OUTPATIENT
Start: 2019-05-02 | End: 2019-08-05 | Stop reason: SDUPTHER

## 2019-05-02 RX ORDER — LIDOCAINE HYDROCHLORIDE 10 MG/ML
INJECTION, SOLUTION EPIDURAL; INFILTRATION; INTRACAUDAL; PERINEURAL
Status: DISCONTINUED
Start: 2019-05-02 | End: 2019-05-02 | Stop reason: HOSPADM

## 2019-05-02 RX ORDER — DEXAMETHASONE SODIUM PHOSPHATE 10 MG/ML
10 INJECTION INTRAMUSCULAR; INTRAVENOUS ONCE
Status: COMPLETED | OUTPATIENT
Start: 2019-05-02 | End: 2019-05-02

## 2019-05-02 RX ADMIN — ALPRAZOLAM 1 MG: 0.5 TABLET, ORALLY DISINTEGRATING ORAL at 12:05

## 2019-05-02 NOTE — OP NOTE
Cervical Interlaminar Epidural Steroid Injection under fluoroscopic guidance.  Time-out taken to identify patient and procedure side prior to starting the procedure.   I attest that I have reviewed the patient's home medications prior to the procedure and no contraindication have been identified. I re-evaluated the patient after the patient was positioned for the procedure in the procedure room immediately before the procedural time-out. The vital signs are current and represent the current state of the patient which has not significantly changed since the preprocedure assessment.                                                              Date of Service: 05/02/2019    PCP: Azikiwe K Lombard, MD    Referring Physician:    Procedure: C7-T1 cervical interlaminar epidural steroid injection under fluoroscopy.    Reasons for procedure: DDD (degenerative disc disease), cervical [M50.30]  Osteoarthritis of spine with radiculopathy, cervical region [M47.22]    Physician: Dariel Doan MD  ASSISTANTS: None    Medications injected:  Preservative-free dexamethasone 10mg and Xylocaine 1% MPF 1ml.  This was followed by a slow injection of 4 mL sterile, preservative-free normal saline.    Local anesthetic used: Xylocaine 2% 9ml with Sodium Bicarbonate 1ml.     Sedation Medications: None    Complications:  none.    Estimated blood loss: none.    Technique:  With the patient laying in a prone position with the neck in a flexed forward position, the area was prepped and draped in the usual sterile fashion using ChloraPrep and a fenestrated drape.  The area was determined under fluoroscopic guidance.  Local anesthetic was given using a 27-gauge needle by raising a wheal and going down to the osseous elements of the cervical spine.  A 3.5 inch 20-gauge Touhy needle was introduced under fluoroscopic guidance to meet the lamina of T1.  The needle was then hinged under the lamina then advanced using loss of resistance technique.   Once the tip of the needle was in the desired position, the contrast dye Omnipaque was injected to determine placement and no vascular runoff.  Digital subtraction was employed to confirm that there is no vascular runoff.  The steroid was then injected slowly followed by a slow injection of the 4 mL of the sterile preservative-free normal saline.  The patient tolerated the procedure well.    Pain before the procedure: 7/10    Pain after the procedure: 4/10    The patient was monitored after the procedure and was given post-procedure and discharge instructions to follow at home. The patient was discharged in a stable condition

## 2019-05-02 NOTE — OR NURSING
Tolerated procedure with difficulties. bandaid to upper back area noted dry and intact. Patient remains awake and alert. Discharge instruction given. Understanding verbalized. Ambulatory for discharge with S.O

## 2019-05-02 NOTE — DISCHARGE SUMMARY
Discharge Diagnosis:DDD (degenerative disc disease), cervical [M50.30]  Osteoarthritis of spine with radiculopathy, cervical region [M47.22]  Condition on Discharge: Stable.  Diet on Discharge: Same as before.  Activity: as per instruction sheet.  Discharge to: Home with a responsible adult.  Follow up: as per Discharge instructions

## 2019-05-03 DIAGNOSIS — E11.9 TYPE 2 DIABETES MELLITUS WITHOUT COMPLICATION: ICD-10-CM

## 2019-05-13 ENCOUNTER — TELEPHONE (OUTPATIENT)
Dept: PAIN MEDICINE | Facility: CLINIC | Age: 69
End: 2019-05-13

## 2019-05-13 NOTE — TELEPHONE ENCOUNTER
Reminded patient of Pain Management appointment scheduled for tomorrow at 8:45 am with Dr. Doan- verbal confirmation received.  Location information also provided.

## 2019-05-14 ENCOUNTER — OFFICE VISIT (OUTPATIENT)
Dept: PAIN MEDICINE | Facility: CLINIC | Age: 69
End: 2019-05-14
Payer: MEDICARE

## 2019-05-14 VITALS
DIASTOLIC BLOOD PRESSURE: 84 MMHG | BODY MASS INDEX: 46.65 KG/M2 | RESPIRATION RATE: 18 BRPM | WEIGHT: 315 LBS | HEART RATE: 86 BPM | SYSTOLIC BLOOD PRESSURE: 151 MMHG | OXYGEN SATURATION: 96 % | HEIGHT: 69 IN

## 2019-05-14 DIAGNOSIS — M50.30 DDD (DEGENERATIVE DISC DISEASE), CERVICAL: Primary | ICD-10-CM

## 2019-05-14 DIAGNOSIS — M51.36 DDD (DEGENERATIVE DISC DISEASE), LUMBAR: ICD-10-CM

## 2019-05-14 DIAGNOSIS — M47.812 OSTEOARTHRITIS OF CERVICAL SPINE, UNSPECIFIED SPINAL OSTEOARTHRITIS COMPLICATION STATUS: ICD-10-CM

## 2019-05-14 DIAGNOSIS — M47.816 LUMBAR SPONDYLOSIS: ICD-10-CM

## 2019-05-14 PROCEDURE — 99999 PR PBB SHADOW E&M-EST. PATIENT-LVL III: CPT | Mod: PBBFAC,,, | Performed by: PAIN MEDICINE

## 2019-05-14 PROCEDURE — 1101F PT FALLS ASSESS-DOCD LE1/YR: CPT | Mod: CPTII,S$GLB,, | Performed by: PAIN MEDICINE

## 2019-05-14 PROCEDURE — 3077F PR MOST RECENT SYSTOLIC BLOOD PRESSURE >= 140 MM HG: ICD-10-PCS | Mod: CPTII,S$GLB,, | Performed by: PAIN MEDICINE

## 2019-05-14 PROCEDURE — 99999 PR PBB SHADOW E&M-EST. PATIENT-LVL III: ICD-10-PCS | Mod: PBBFAC,,, | Performed by: PAIN MEDICINE

## 2019-05-14 PROCEDURE — 3079F PR MOST RECENT DIASTOLIC BLOOD PRESSURE 80-89 MM HG: ICD-10-PCS | Mod: CPTII,S$GLB,, | Performed by: PAIN MEDICINE

## 2019-05-14 PROCEDURE — 3079F DIAST BP 80-89 MM HG: CPT | Mod: CPTII,S$GLB,, | Performed by: PAIN MEDICINE

## 2019-05-14 PROCEDURE — 99214 PR OFFICE/OUTPT VISIT, EST, LEVL IV, 30-39 MIN: ICD-10-PCS | Mod: S$GLB,,, | Performed by: PAIN MEDICINE

## 2019-05-14 PROCEDURE — 1101F PR PT FALLS ASSESS DOC 0-1 FALLS W/OUT INJ PAST YR: ICD-10-PCS | Mod: CPTII,S$GLB,, | Performed by: PAIN MEDICINE

## 2019-05-14 PROCEDURE — 99214 OFFICE O/P EST MOD 30 MIN: CPT | Mod: S$GLB,,, | Performed by: PAIN MEDICINE

## 2019-05-14 PROCEDURE — 3077F SYST BP >= 140 MM HG: CPT | Mod: CPTII,S$GLB,, | Performed by: PAIN MEDICINE

## 2019-05-14 NOTE — H&P (VIEW-ONLY)
Subjective:     Patient ID: Bj Pate Jr. is a 69 y.o. male    Chief Complaint: Follow-up (S/P C7-T1 HARVEY 05.02.19)      Referred by: No ref. provider found      HPI:    Interval History (5/14/19):  He returns today for follow up.  He reports that C7-T1 interlaminar epidural steroid injection has been helpful for the neck and upper extremity pain. He reports about 50% relief of his symptoms.  He denies any changes in the quality location the pain. It today he wishes to discuss his low back pain further.  He has had chronic low back pain for many years.  Pain is located across the lower lumbar region and does not radiate.  He denies any associated numbness, tingling, weakness, bowel or bladder dysfunction.  The pain is constant and worsened with activity.      Initial Encounter (4/11/19):  Bj Pate Jr. is a 69 y.o. male who presents today with chronic neck and low back pain. Will address his neck pain during today's visit.  He states that he has had neck pain for years.  This pain radiates in the bilateral upper extremities and causes numbness in his hands.  This will sometimes wake him from sleep.  The pain is equal on both sides.  He denies any focal weakness.  He denies any bowel bladder dysfunction.  The pain is constant and worsened with activity.  EMG with evidence of chronic denervation in the right C5 through C7 dermatomes.  This pain is described in detail below.    Physical Therapy:  Yes.  Somewhat helpful    Non-pharmacologic Treatment:  Nothing helps         · TENS?  No    Pain Medications:         · Currently taking:  Gabapentin 600 mg t.i.d. p.r.n. (this causes diarrhea), Tylenol, Aleve    · Has tried in the past:      · Has not tried: Opioids, Muscle relaxants, TCAs, SNRIs, topical creams    Blood thinners:  Aspirin 81 mg a day    Interventional Therapies:    5/2/19 - C7-T1 interlaminar epidural steroid injection - 50% relief    Relevant Surgeries:  None    Affecting sleep?  Yes    Affecting  daily activities? yes    Depressive symptoms? no          · SI/HI? No    Work status: Retired    Pain Scores:    Best:       6/10  Worst:     8/10  Usually:   7/10  Today:    6/10    Review of Systems   Constitutional: Negative for activity change, appetite change, chills, fatigue, fever and unexpected weight change.   HENT: Negative for hearing loss.    Eyes: Negative for visual disturbance.   Respiratory: Negative for chest tightness and shortness of breath.    Cardiovascular: Negative for chest pain.   Gastrointestinal: Negative for abdominal pain, constipation, diarrhea, nausea and vomiting.   Genitourinary: Negative for difficulty urinating.   Musculoskeletal: Positive for back pain, gait problem, myalgias, neck pain and neck stiffness.   Skin: Negative for rash.   Neurological: Positive for numbness. Negative for dizziness, weakness, light-headedness and headaches.   Psychiatric/Behavioral: Positive for sleep disturbance. Negative for hallucinations and suicidal ideas. The patient is not nervous/anxious.        Past Medical History:   Diagnosis Date    Basal ganglia hemorrhage     Left basal ganglia hemorrhage with resultant right-sided hemiparesis which has resolved.    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        Past Surgical History:   Procedure Laterality Date    Injection, Steroid, Epidural Cervical N/A 5/2/2019    Performed by Dariel Doan Jr., [facility] at [facility]    NO PAST SURGERIES         Social History     Socioeconomic History    Marital status:      Spouse name: Not on file     Number of children: 3    Years of education: Not on file    Highest education level: Not on file   Occupational History    Occupation:    Social Needs    Financial resource strain: Not on file    Food insecurity:     Worry: Not on file     Inability: Not on file    Transportation needs:     Medical: Not on file     Non-medical: Not on file   Tobacco Use    Smoking status: Never Smoker    Smokeless tobacco: Never Used   Substance and Sexual Activity    Alcohol use: Yes     Alcohol/week: 0.0 oz     Comment: occacionally    Drug use: No    Sexual activity: Not on file   Lifestyle    Physical activity:     Days per week: Not on file     Minutes per session: Not on file    Stress: Not on file   Relationships    Social connections:     Talks on phone: Not on file     Gets together: Not on file     Attends Mu-ism service: Not on file     Active member of club or organization: Not on file     Attends meetings of clubs or organizations: Not on file     Relationship status: Not on file   Other Topics Concern    Not on file   Social History Narrative    Not on file       Review of patient's allergies indicates:   Allergen Reactions    Tomato (solanum lycopersicum) Hives    Naproxen Hives    Shrimp Other (See Comments)       Current Outpatient Medications on File Prior to Visit   Medication Sig Dispense Refill    acetaminophen-codeine 300-30mg (TYLENOL #3) 300-30 mg Tab as needed.       albuterol 90 mcg/actuation inhaler Inhale 2 puffs into the lungs every 6 (six) hours as needed for Wheezing. 1 Inhaler 11    aspirin 325 MG tablet Take 1 tablet (325 mg total) by mouth once daily. (Patient taking differently: Take 81 mg by mouth once daily. )  0    atorvastatin (LIPITOR) 40 MG tablet TAKE 1 TABLET BY MOUTH ONCE DAILY 90 tablet 0    blood pressure test kit-large Kit 1 Device by Misc.(Non-Drug; Combo Route) route 2 (two) times daily. 1 each 0    blood sugar diagnostic Strp To check BG 2  "times daily, to use with insurance preferred meter 200 strip 3    blood-glucose meter kit To check BG 2 times daily, to use with insurance preferred meter 1 each 0    clobetasol 0.05% (TEMOVATE) 0.05 % Oint Apply topically 2 (two) times daily. 45 g 5    COLCRYS 0.6 mg tablet TAKE 2 TABLETS BY MOUTH AT THE FIRST SIGN OF GOUT FLARE THEN 1 TABLET 1 HOUR LATER 30 tablet 2    COLCRYS 0.6 mg tablet TAKE 2 TABLETS BY MOUTH BY MOUTH AT  THE  FIRST  SIGN  OF  GOUT  THEN  1  TAB  1  HOUR  LATER 30 tablet 2    diazePAM (VALIUM) 10 MG Tab Take 10 mg by mouth once daily.   0    diclofenac sodium (VOLTAREN) 1 % Gel APPLY  2 GRAMS  TOPICALLY TO AFFECTED AREA ONCE DAILY 300 g 0    diltiaZEM (CARTIA XT) 240 MG 24 hr capsule Take 1 capsule (240 mg total) by mouth once daily. 90 capsule 1    FLUAD 4247-5203, 65 YR UP,,PF, 45 mcg (15 mcg x 3)/0.5 mL Syrg ADM 0.5ML IM UTD  0    fluticasone (FLONASE) 50 mcg/actuation nasal spray       fluticasone-salmeterol diskus inhaler 250-50 mcg INHALE ONE PUFF INTO LUNGS TWICE DAILY 60 each 2    fluticasone-salmeterol diskus inhaler 250-50 mcg INHALE 1 PUFF INTO LUNGS TWICE DAILY 60 each 0    FLUZONE HIGH-DOSE 2017-18, PF, 180 mcg/0.5 mL vaccine ADM 0.5ML IM UTD  0    gabapentin (NEURONTIN) 600 MG tablet Take 1 tablet (600 mg total) by mouth 3 (three) times daily. 90 tablet 11    lancets Misc To check BG 2 times daily, to use with insurance preferred meter 200 each 3    levocetirizine (XYZAL) 5 MG tablet TAKE ONE TABLET BY MOUTH ONCE DAILY IN THE EVENING 30 tablet 11    lisinopril-hydrochlorothiazide (PRINZIDE,ZESTORETIC) 20-25 mg Tab Take 1 tablet by mouth once daily. 90 tablet 1    [DISCONTINUED] nystatin (MYCOSTATIN) cream APPLY   TOPICALLY TO AFFECTED AREA TWICE DAILY 60 g 0     No current facility-administered medications on file prior to visit.        Objective:      BP (!) 151/84   Pulse 86   Resp 18   Ht 5' 9" (1.753 m)   Wt (!) 149.1 kg (328 lb 9.6 oz)   SpO2 96%   " BMI 48.53 kg/m²     Exam:  GEN:  Well developed, well nourished.  No acute distress.  Normal pain behavior.  HEENT:  No trauma.  Mucous membranes moist.  Nares patent bilaterally.  PSYCH: Normal affect. Thought content appropriate.  CHEST:  Breathing symmetric.  No audible wheezing.  ABD: Soft, non-distended.  SKIN:  Warm, pink, dry.  No rash on exposed areas.    EXT:  No cyanosis, clubbing, or edema.  No color change or changes in nail or hair growth.  NEURO/MUSCULOSKELETAL:  Fully alert, oriented, and appropriate. Speech normal pérez. No cranial nerve deficits.   Gait:  Normal.  No trendelenburg sign bilaterally.   Motor Strength: 5/5 motor strength throughout lower extremities.   Sensory:  No sensory deficit in the lower extremities.   Reflexes:  2 + and symmetric throughout.  Downgoing Babinski's bilaterally.  No clonus or spasticity.  L-Spine:  Limited ROM with pain on extension more than flexion.  Positive facet loading bilaterally.  Negative SLR bilaterally.    SI Joint/Hip:  Negative HOSEA bilaterally.  Negative FADIR bilaterally.  No TTP over lumbar paraspinals, bilateral SI joints, hips, piriformis muscles, or GTB.                Imaging:  Narrative     EXAMINATION:  MRI CERVICAL SPINE WITHOUT CONTRAST    CLINICAL HISTORY:  Cervical radiculopathy; Carpal tunnel syndrome, bilateral upper limbs    TECHNIQUE:  Multiplanar, multisequence MR images of the cervical spine were performed without the administration of contrast.  Exam significantly limited secondary to patient motion, body habitus, and technical inability to place coil properly.    COMPARISON:  None.    FINDINGS:  Alignment: Mild straightening of normal cervical lordosis.  There is minimal suspected retrolisthesis of C3 on C4 and C4 on C5.    Vertebrae: Normal marrow signal. No fracture.    Discs: Intervertebral disc height loss and degenerative signal change.    Cord: Normal.    Skull base and craniocervical junction: Normal.    Degenerative  findings:    C2-C3: Posterior disc osteophyte complex and moderate left facet arthropathy resulting in moderate left neural foraminal narrowing.  No spinal canal stenosis.    C3-C4: Posterior disc osteophyte complex and facet arthropathy resulting in mild spinal canal stenosis, moderate left neural foraminal narrowing, and mild right neural foraminal narrowing.    C4-C5: Posterior disc osteophyte complex and facet arthropathy resulting in moderate spinal canal stenosis, severe left neural foraminal narrowing, and mild right neural foraminal narrowing.    C5-C6: Posterior disc osteophyte complex and facet arthropathy resulting in moderate spinal canal stenosis and moderate bilateral neural foraminal narrowing.    C6-C7: Posterior disc osteophyte complex and facet arthropathy resulting in mild-to-moderate moderate spinal canal stenosis and severe bilateral neural foraminal narrowing.    C7-T1: Posterior disc osteophyte complex and facet arthropathy resulting in mild spinal canal stenosis, severe right neural foraminal narrowing, and moderate left neural foraminal narrowing.    Paraspinal muscles & soft tissues: Unremarkable.   Impression       Exam significantly limited secondary to patient motion, body habitus, and technical inability to place coil.    Multilevel degenerative change of the cervical spine with multiple areas of mild to moderate spinal canal stenosis and moderate-severe neural foraminal narrowing as detailed above.    Electronically signed by resident: sIidro Mackenzie  Date: 01/17/2019  Time: 09:45    Electronically signed by: Raymond Plasencia MD  Date: 01/17/2019  Time: 11:39         Assessment:       Encounter Diagnoses   Name Primary?    DDD (degenerative disc disease), cervical Yes    DDD (degenerative disc disease), lumbar     Osteoarthritis of cervical spine, unspecified spinal osteoarthritis complication status     Lumbar spondylosis          Plan:       Bj was seen today for  follow-up.    Diagnoses and all orders for this visit:    DDD (degenerative disc disease), cervical  -     X-Ray Lumbar Complete With Flex And Ext; Future    DDD (degenerative disc disease), lumbar  -     X-Ray Lumbar Complete With Flex And Ext; Future    Osteoarthritis of cervical spine, unspecified spinal osteoarthritis complication status  -     X-Ray Lumbar Complete With Flex And Ext; Future    Lumbar spondylosis  -     X-Ray Lumbar Complete With Flex And Ext; Future  -     Case request GI: Lumbar Medial Branch Blocks        Bj Pate Jr. is a 69 y.o. male with chronic bilateral neck and upper extremity pain/numbness.  Possibly due to bilateral radiculopathy.  Multilevel foraminal stenosis noted on cervical MRI.  50% relief following cervical epidural steroid injection. Also with chronic bilateral low back pain.  Low back pain appears to be axial and most likely related to facet joints.    1.  Lumbar x-rays today to evaluate for lumbar spondylosis.  2.  Schedule for bilateral L3, L4 and L5 medial branch blocks x2.  If diagnostic can proceed with RFA.  3.  Return to clinic 2 weeks after procedure discuss results.  May consider repeat cervical epidural steroid injection in the future if needed.

## 2019-05-14 NOTE — PROGRESS NOTES
Mr. Pate will be scheduled for Bilateral L3, L4, L5 MBB on 05/29/2019.  Reviewed the pre-procedure instructions listed below with him- copy also provided.  Instructed patient to notify clinic if he begins feeling ill, runs a fever, is prescribed antibiotics, and/or has any outpatient procedures within the two weeks leading up to this procedure.  Instructed patient to report to [facility], 2nd Floor Endoscopy Registration Desk.  All questions answered- patient verbalized understanding.  Patient will need to hold ASA x 7 days.    Day of Procedure  - Ensure you have obtained an arrival time from the Pain Management Staff  o Procedure Area will call 1-3 days in advance with your arrival time.  Please check any voicemails received.  o If you arrive past your scheduled procedure time, you may be asked to reschedule your procedure.  - Ensure you have a  with you to remain present throughout your procedure for your safety.  o If you arrive without a responsible adult to stay with you and drive you home, you may be asked to reschedule your procedure.  - Take all of your prescribed medications (exceptions noted below) with a small amount of water  - This is NOT a fasting procedure, you may have a light meal before coming.  - Wear comfortable clothing (loose fitting pants).  - You may wear glasses, dentures, contact lenses, and/or hearing aids. Please remove all jewelry and metal hairpins.  - Notify the nurse during the intake process if you are allergic to any medications, if you are diabetic, or if you are not feeling well  - Contact the Pain Management Clinic with the following:  o A fever greater than 100° (degrees)   o Feel ill, have any type of infection, or are taking antibiotics now or within the two (2) weeks leading up to this procedure  o Have any outpatient procedures within the two (2) weeks leading up to this procedure (colonoscopy, major dental work, etc.)    IF you are taking blood  thinners: Only upon receiving clearance and notification from the Pain Management Department  7 Days Prior to Your Procedure:  - Stop taking Plavix/Clopridogrel, Effient/Prasugel, ASA  5 Days Prior to Your Procedure:  - Stop taking Coumadin/Warfarin.  An INR may be drawn prior to your procedure.  If labs are required, you will need to arrive earlier than your scheduled arrival time.  - Stop taking Pradaxa/Dabigatra,  Brilinta/ Ticagrelor  3 Days Prior to Your Procedure:  - Stop taking Xarelto/Rivaroxaban, Eliquis/Apixaban, Aggrenox/Dipyridamole, Reopro/Abciximab

## 2019-05-14 NOTE — PROGRESS NOTES
Subjective:     Patient ID: Bj Pate Jr. is a 69 y.o. male    Chief Complaint: Follow-up (S/P C7-T1 HARVEY 05.02.19)      Referred by: No ref. provider found      HPI:    Interval History (5/14/19):  He returns today for follow up.  He reports that C7-T1 interlaminar epidural steroid injection has been helpful for the neck and upper extremity pain. He reports about 50% relief of his symptoms.  He denies any changes in the quality location the pain. It today he wishes to discuss his low back pain further.  He has had chronic low back pain for many years.  Pain is located across the lower lumbar region and does not radiate.  He denies any associated numbness, tingling, weakness, bowel or bladder dysfunction.  The pain is constant and worsened with activity.      Initial Encounter (4/11/19):  Bj Pate Jr. is a 69 y.o. male who presents today with chronic neck and low back pain. Will address his neck pain during today's visit.  He states that he has had neck pain for years.  This pain radiates in the bilateral upper extremities and causes numbness in his hands.  This will sometimes wake him from sleep.  The pain is equal on both sides.  He denies any focal weakness.  He denies any bowel bladder dysfunction.  The pain is constant and worsened with activity.  EMG with evidence of chronic denervation in the right C5 through C7 dermatomes.  This pain is described in detail below.    Physical Therapy:  Yes.  Somewhat helpful    Non-pharmacologic Treatment:  Nothing helps         · TENS?  No    Pain Medications:         · Currently taking:  Gabapentin 600 mg t.i.d. p.r.n. (this causes diarrhea), Tylenol, Aleve    · Has tried in the past:      · Has not tried: Opioids, Muscle relaxants, TCAs, SNRIs, topical creams    Blood thinners:  Aspirin 81 mg a day    Interventional Therapies:    5/2/19 - C7-T1 interlaminar epidural steroid injection - 50% relief    Relevant Surgeries:  None    Affecting sleep?  Yes    Affecting  daily activities? yes    Depressive symptoms? no          · SI/HI? No    Work status: Retired    Pain Scores:    Best:       6/10  Worst:     8/10  Usually:   7/10  Today:    6/10    Review of Systems   Constitutional: Negative for activity change, appetite change, chills, fatigue, fever and unexpected weight change.   HENT: Negative for hearing loss.    Eyes: Negative for visual disturbance.   Respiratory: Negative for chest tightness and shortness of breath.    Cardiovascular: Negative for chest pain.   Gastrointestinal: Negative for abdominal pain, constipation, diarrhea, nausea and vomiting.   Genitourinary: Negative for difficulty urinating.   Musculoskeletal: Positive for back pain, gait problem, myalgias, neck pain and neck stiffness.   Skin: Negative for rash.   Neurological: Positive for numbness. Negative for dizziness, weakness, light-headedness and headaches.   Psychiatric/Behavioral: Positive for sleep disturbance. Negative for hallucinations and suicidal ideas. The patient is not nervous/anxious.        Past Medical History:   Diagnosis Date    Basal ganglia hemorrhage     Left basal ganglia hemorrhage with resultant right-sided hemiparesis which has resolved.    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        Past Surgical History:   Procedure Laterality Date    Injection, Steroid, Epidural Cervical N/A 5/2/2019    Performed by Dariel Doan Jr[facility] at [facility] ENDO    NO PAST SURGERIES         Social History     Socioeconomic History    Marital status:      Spouse name: Not on file     Number of children: 3    Years of education: Not on file    Highest education level: Not on file   Occupational History    Occupation:    Social Needs    Financial resource strain: Not on file    Food insecurity:     Worry: Not on file     Inability: Not on file    Transportation needs:     Medical: Not on file     Non-medical: Not on file   Tobacco Use    Smoking status: Never Smoker    Smokeless tobacco: Never Used   Substance and Sexual Activity    Alcohol use: Yes     Alcohol/week: 0.0 oz     Comment: occacionally    Drug use: No    Sexual activity: Not on file   Lifestyle    Physical activity:     Days per week: Not on file     Minutes per session: Not on file    Stress: Not on file   Relationships    Social connections:     Talks on phone: Not on file     Gets together: Not on file     Attends Confucianist service: Not on file     Active member of club or organization: Not on file     Attends meetings of clubs or organizations: Not on file     Relationship status: Not on file   Other Topics Concern    Not on file   Social History Narrative    Not on file       Review of patient's allergies indicates:   Allergen Reactions    Tomato (solanum lycopersicum) Hives    Naproxen Hives    Shrimp Other (See Comments)       Current Outpatient Medications on File Prior to Visit   Medication Sig Dispense Refill    acetaminophen-codeine 300-30mg (TYLENOL #3) 300-30 mg Tab as needed.       albuterol 90 mcg/actuation inhaler Inhale 2 puffs into the lungs every 6 (six) hours as needed for Wheezing. 1 Inhaler 11    aspirin 325 MG tablet Take 1 tablet (325 mg total) by mouth once daily. (Patient taking differently: Take 81 mg by mouth once daily. )  0    atorvastatin (LIPITOR) 40 MG tablet TAKE 1 TABLET BY MOUTH ONCE DAILY 90 tablet 0    blood pressure test kit-large Kit 1 Device by Misc.(Non-Drug; Combo Route) route 2 (two) times daily. 1 each 0    blood sugar diagnostic Strp To check BG 2  "times daily, to use with insurance preferred meter 200 strip 3    blood-glucose meter kit To check BG 2 times daily, to use with insurance preferred meter 1 each 0    clobetasol 0.05% (TEMOVATE) 0.05 % Oint Apply topically 2 (two) times daily. 45 g 5    COLCRYS 0.6 mg tablet TAKE 2 TABLETS BY MOUTH AT THE FIRST SIGN OF GOUT FLARE THEN 1 TABLET 1 HOUR LATER 30 tablet 2    COLCRYS 0.6 mg tablet TAKE 2 TABLETS BY MOUTH BY MOUTH AT  THE  FIRST  SIGN  OF  GOUT  THEN  1  TAB  1  HOUR  LATER 30 tablet 2    diazePAM (VALIUM) 10 MG Tab Take 10 mg by mouth once daily.   0    diclofenac sodium (VOLTAREN) 1 % Gel APPLY  2 GRAMS  TOPICALLY TO AFFECTED AREA ONCE DAILY 300 g 0    diltiaZEM (CARTIA XT) 240 MG 24 hr capsule Take 1 capsule (240 mg total) by mouth once daily. 90 capsule 1    FLUAD 4997-0614, 65 YR UP,,PF, 45 mcg (15 mcg x 3)/0.5 mL Syrg ADM 0.5ML IM UTD  0    fluticasone (FLONASE) 50 mcg/actuation nasal spray       fluticasone-salmeterol diskus inhaler 250-50 mcg INHALE ONE PUFF INTO LUNGS TWICE DAILY 60 each 2    fluticasone-salmeterol diskus inhaler 250-50 mcg INHALE 1 PUFF INTO LUNGS TWICE DAILY 60 each 0    FLUZONE HIGH-DOSE 2017-18, PF, 180 mcg/0.5 mL vaccine ADM 0.5ML IM UTD  0    gabapentin (NEURONTIN) 600 MG tablet Take 1 tablet (600 mg total) by mouth 3 (three) times daily. 90 tablet 11    lancets Misc To check BG 2 times daily, to use with insurance preferred meter 200 each 3    levocetirizine (XYZAL) 5 MG tablet TAKE ONE TABLET BY MOUTH ONCE DAILY IN THE EVENING 30 tablet 11    lisinopril-hydrochlorothiazide (PRINZIDE,ZESTORETIC) 20-25 mg Tab Take 1 tablet by mouth once daily. 90 tablet 1    [DISCONTINUED] nystatin (MYCOSTATIN) cream APPLY   TOPICALLY TO AFFECTED AREA TWICE DAILY 60 g 0     No current facility-administered medications on file prior to visit.        Objective:      BP (!) 151/84   Pulse 86   Resp 18   Ht 5' 9" (1.753 m)   Wt (!) 149.1 kg (328 lb 9.6 oz)   SpO2 96%   " BMI 48.53 kg/m²     Exam:  GEN:  Well developed, well nourished.  No acute distress.  Normal pain behavior.  HEENT:  No trauma.  Mucous membranes moist.  Nares patent bilaterally.  PSYCH: Normal affect. Thought content appropriate.  CHEST:  Breathing symmetric.  No audible wheezing.  ABD: Soft, non-distended.  SKIN:  Warm, pink, dry.  No rash on exposed areas.    EXT:  No cyanosis, clubbing, or edema.  No color change or changes in nail or hair growth.  NEURO/MUSCULOSKELETAL:  Fully alert, oriented, and appropriate. Speech normal pérez. No cranial nerve deficits.   Gait:  Normal.  No trendelenburg sign bilaterally.   Motor Strength: 5/5 motor strength throughout lower extremities.   Sensory:  No sensory deficit in the lower extremities.   Reflexes:  2 + and symmetric throughout.  Downgoing Babinski's bilaterally.  No clonus or spasticity.  L-Spine:  Limited ROM with pain on extension more than flexion.  Positive facet loading bilaterally.  Negative SLR bilaterally.    SI Joint/Hip:  Negative HOSEA bilaterally.  Negative FADIR bilaterally.  No TTP over lumbar paraspinals, bilateral SI joints, hips, piriformis muscles, or GTB.                Imaging:  Narrative     EXAMINATION:  MRI CERVICAL SPINE WITHOUT CONTRAST    CLINICAL HISTORY:  Cervical radiculopathy; Carpal tunnel syndrome, bilateral upper limbs    TECHNIQUE:  Multiplanar, multisequence MR images of the cervical spine were performed without the administration of contrast.  Exam significantly limited secondary to patient motion, body habitus, and technical inability to place coil properly.    COMPARISON:  None.    FINDINGS:  Alignment: Mild straightening of normal cervical lordosis.  There is minimal suspected retrolisthesis of C3 on C4 and C4 on C5.    Vertebrae: Normal marrow signal. No fracture.    Discs: Intervertebral disc height loss and degenerative signal change.    Cord: Normal.    Skull base and craniocervical junction: Normal.    Degenerative  findings:    C2-C3: Posterior disc osteophyte complex and moderate left facet arthropathy resulting in moderate left neural foraminal narrowing.  No spinal canal stenosis.    C3-C4: Posterior disc osteophyte complex and facet arthropathy resulting in mild spinal canal stenosis, moderate left neural foraminal narrowing, and mild right neural foraminal narrowing.    C4-C5: Posterior disc osteophyte complex and facet arthropathy resulting in moderate spinal canal stenosis, severe left neural foraminal narrowing, and mild right neural foraminal narrowing.    C5-C6: Posterior disc osteophyte complex and facet arthropathy resulting in moderate spinal canal stenosis and moderate bilateral neural foraminal narrowing.    C6-C7: Posterior disc osteophyte complex and facet arthropathy resulting in mild-to-moderate moderate spinal canal stenosis and severe bilateral neural foraminal narrowing.    C7-T1: Posterior disc osteophyte complex and facet arthropathy resulting in mild spinal canal stenosis, severe right neural foraminal narrowing, and moderate left neural foraminal narrowing.    Paraspinal muscles & soft tissues: Unremarkable.   Impression       Exam significantly limited secondary to patient motion, body habitus, and technical inability to place coil.    Multilevel degenerative change of the cervical spine with multiple areas of mild to moderate spinal canal stenosis and moderate-severe neural foraminal narrowing as detailed above.    Electronically signed by resident: Isidro Mackenzie  Date: 01/17/2019  Time: 09:45    Electronically signed by: Raymond Plasencia MD  Date: 01/17/2019  Time: 11:39         Assessment:       Encounter Diagnoses   Name Primary?    DDD (degenerative disc disease), cervical Yes    DDD (degenerative disc disease), lumbar     Osteoarthritis of cervical spine, unspecified spinal osteoarthritis complication status     Lumbar spondylosis          Plan:       Bj was seen today for  follow-up.    Diagnoses and all orders for this visit:    DDD (degenerative disc disease), cervical  -     X-Ray Lumbar Complete With Flex And Ext; Future    DDD (degenerative disc disease), lumbar  -     X-Ray Lumbar Complete With Flex And Ext; Future    Osteoarthritis of cervical spine, unspecified spinal osteoarthritis complication status  -     X-Ray Lumbar Complete With Flex And Ext; Future    Lumbar spondylosis  -     X-Ray Lumbar Complete With Flex And Ext; Future  -     Case request GI: Lumbar Medial Branch Blocks        Bj Pate Jr. is a 69 y.o. male with chronic bilateral neck and upper extremity pain/numbness.  Possibly due to bilateral radiculopathy.  Multilevel foraminal stenosis noted on cervical MRI.  50% relief following cervical epidural steroid injection. Also with chronic bilateral low back pain.  Low back pain appears to be axial and most likely related to facet joints.    1.  Lumbar x-rays today to evaluate for lumbar spondylosis.  2.  Schedule for bilateral L3, L4 and L5 medial branch blocks x2.  If diagnostic can proceed with RFA.  3.  Return to clinic 2 weeks after procedure discuss results.  May consider repeat cervical epidural steroid injection in the future if needed.

## 2019-05-21 ENCOUNTER — TELEPHONE (OUTPATIENT)
Dept: PAIN MEDICINE | Facility: CLINIC | Age: 69
End: 2019-05-21

## 2019-05-21 NOTE — TELEPHONE ENCOUNTER
Patient confirmed message regarding last dose of ASA today, will resume following procedure next Wednesday.

## 2019-05-21 NOTE — TELEPHONE ENCOUNTER
Message left reminding Mr. Pate that he  is to hold ASA for 7 days prior to Pain Management procedure scheduled on 05/29/2019.  Confirmed that last dose will be today.  Asked that patient call clinic to confirm message.

## 2019-05-29 ENCOUNTER — HOSPITAL ENCOUNTER (OUTPATIENT)
Facility: HOSPITAL | Age: 69
Discharge: HOME | End: 2019-05-29
Attending: PAIN MEDICINE | Admitting: PAIN MEDICINE
Payer: MEDICARE

## 2019-05-29 ENCOUNTER — HOSPITAL ENCOUNTER (OUTPATIENT)
Dept: RADIOLOGY | Facility: HOSPITAL | Age: 69
Discharge: HOME | End: 2019-05-29
Attending: PAIN MEDICINE | Admitting: PAIN MEDICINE
Payer: MEDICARE

## 2019-05-29 VITALS
DIASTOLIC BLOOD PRESSURE: 75 MMHG | TEMPERATURE: 98 F | OXYGEN SATURATION: 98 % | BODY MASS INDEX: 46.65 KG/M2 | RESPIRATION RATE: 18 BRPM | WEIGHT: 315 LBS | HEIGHT: 69 IN | SYSTOLIC BLOOD PRESSURE: 154 MMHG | HEART RATE: 77 BPM

## 2019-05-29 DIAGNOSIS — M47.816 LUMBAR SPONDYLOSIS: ICD-10-CM

## 2019-05-29 DIAGNOSIS — M47.816 LUMBAR SPONDYLOSIS: Primary | ICD-10-CM

## 2019-05-29 PROCEDURE — 64495 INJ PARAVERT F JNT L/S 3 LEV: CPT | Mod: 50 | Performed by: PAIN MEDICINE

## 2019-05-29 PROCEDURE — 64493 INJ PARAVERT F JNT L/S 1 LEV: CPT | Mod: 50,,, | Performed by: PAIN MEDICINE

## 2019-05-29 PROCEDURE — 64494 PR INJ DX/THER AGNT PARAVERT FACET JOINT,IMG GUIDE,LUMBAR/SAC, 2ND LEVEL: ICD-10-PCS | Mod: 50,,, | Performed by: PAIN MEDICINE

## 2019-05-29 PROCEDURE — 64494 INJ PARAVERT F JNT L/S 2 LEV: CPT | Mod: 50 | Performed by: PAIN MEDICINE

## 2019-05-29 PROCEDURE — 64493 INJ PARAVERT F JNT L/S 1 LEV: CPT | Mod: 50 | Performed by: PAIN MEDICINE

## 2019-05-29 PROCEDURE — 64494 INJ PARAVERT F JNT L/S 2 LEV: CPT | Mod: 50,,, | Performed by: PAIN MEDICINE

## 2019-05-29 PROCEDURE — 64493 PR INJ DX/THER AGNT PARAVERT FACET JOINT,IMG GUIDE,LUMBAR/SAC,1ST LVL: ICD-10-PCS | Mod: 50,,, | Performed by: PAIN MEDICINE

## 2019-05-29 PROCEDURE — 25000003 PHARM REV CODE 250: Performed by: PAIN MEDICINE

## 2019-05-29 RX ORDER — BUPIVACAINE HYDROCHLORIDE 2.5 MG/ML
10 INJECTION, SOLUTION EPIDURAL; INFILTRATION; INTRACAUDAL ONCE
Status: COMPLETED | OUTPATIENT
Start: 2019-05-29 | End: 2019-05-29

## 2019-05-29 RX ORDER — LIDOCAINE HYDROCHLORIDE 20 MG/ML
INJECTION, SOLUTION INFILTRATION; PERINEURAL
Status: DISCONTINUED
Start: 2019-05-29 | End: 2019-05-29 | Stop reason: HOSPADM

## 2019-05-29 RX ORDER — LIDOCAINE HYDROCHLORIDE 20 MG/ML
10 INJECTION, SOLUTION INFILTRATION; PERINEURAL ONCE
Status: COMPLETED | OUTPATIENT
Start: 2019-05-29 | End: 2019-05-29

## 2019-05-29 RX ORDER — BUPIVACAINE HYDROCHLORIDE 2.5 MG/ML
INJECTION, SOLUTION EPIDURAL; INFILTRATION; INTRACAUDAL
Status: DISCONTINUED
Start: 2019-05-29 | End: 2019-05-29 | Stop reason: HOSPADM

## 2019-05-29 NOTE — DISCHARGE INSTRUCTIONS
Home Care Instructions Pain Management:    1. DIET:   You may resume your normal diet today.   2. BATHING:   You may shower with luke warm water.  3. DRESSING:   You may remove your bandage today.   4. ACTIVITY LEVEL:   You may resume your normal activities 24 hrs after your procedure.  5. MEDICATIONS:   You may resume your normal medications today.   6. SPECIAL INSTRUCTIONS:   No heat to the injection site for 24 hrs including, bath or shower, heating pad, moist heat, or hot tubs.    Use ice pack to injection site for any pain or discomfort.  Apply ice packs for 20 minute intervals as needed.         PLEASE CALL YOUR DOCTOR IF:  1. Redness or swelling around the injection site.  2. Fever of 101 degrees  3. Drainage (pus) from the injection site.  4. For any continuous bleeding (some dried blood over the incision is normal.)    FOR EMERGENCIES:   If any unusual problems or difficulties occur during clinic hours, call [phone] or [phone].

## 2019-05-29 NOTE — OP NOTE
lUMBAR Medial Branch Block Under Fluoroscopy  Time-out taken to identify patient and procedure side prior to starting the procedure.   I attest that I have reviewed the patient's home medications prior to the procedure and no contraindication have been identified. I  re-evaluated the patient after the patient was positioned for the procedure in the procedure room immediately before the procedural time-out. The vital signs are current and represent the current state of the patient which has not significantly changed since the preprocedure assessment.            Date of Service: 05/29/2019    PCP: Azikiwe K Lombard, MD    Referring Physician:                                                           PROCEDURE:  Bilateral  L3, L4 and L5 medial branch block    REASON FOR PROCEDURE: Lumbar spondylosis [M47.816]    PHYSICIAN: Dariel Doan MD  ASSISTANTS: None    MEDICATIONS INJECTED: Bupivicaine 0.25% 1.5ml at each level      LOCAL ANESTHETIC USED: Xylocaine 2% 9ml with Sodium Bicarbonate 1ml.  3ml each site.    SEDATION MEDICATIONS: None    ESTIMATED BLOOD LOSS:  None.    COMPLICATIONS:  None.    TECHNIQUE: Laying in a prone position, the patient was prepped and draped in the usual sterile fashion using ChloraPrep and fenestrated drape.  The level was determined under fluoroscopic guidance.  Local anesthetic was given by going down to the hub of the 27-gauge 1.25in needle and raising a wheel.  A 22-gauge 3.5inch needle was introduced to the anatomic local of the medial branch at the lateral mass utilizing live fluoroscopy. Medication was then injected slowly. The patient tolerated the procedure well.       The patient was monitored after the procedure.  Patient was given post procedure and discharge instructions to follow at home.  We will see the patient back in two weeks or the patient may call to inform of status. The patient was discharged in a stable condition

## 2019-05-30 ENCOUNTER — TELEPHONE (OUTPATIENT)
Dept: PAIN MEDICINE | Facility: CLINIC | Age: 69
End: 2019-05-30

## 2019-05-30 NOTE — TELEPHONE ENCOUNTER
Attempted to contact patient to discuss MBB performed yesterday.  VM left asking patient to contact clinic- phone number included.

## 2019-06-12 DIAGNOSIS — I10 ESSENTIAL HYPERTENSION: ICD-10-CM

## 2019-06-14 ENCOUNTER — TELEPHONE (OUTPATIENT)
Dept: PAIN MEDICINE | Facility: CLINIC | Age: 69
End: 2019-06-14

## 2019-06-14 RX ORDER — DILTIAZEM HYDROCHLORIDE 240 MG/1
CAPSULE, EXTENDED RELEASE ORAL
Qty: 90 CAPSULE | Refills: 0 | Status: SHIPPED | OUTPATIENT
Start: 2019-06-14 | End: 2019-08-19 | Stop reason: SDUPTHER

## 2019-06-17 ENCOUNTER — OFFICE VISIT (OUTPATIENT)
Dept: PAIN MEDICINE | Facility: CLINIC | Age: 69
End: 2019-06-17
Payer: MEDICARE

## 2019-06-17 DIAGNOSIS — M47.812 OSTEOARTHRITIS OF CERVICAL SPINE, UNSPECIFIED SPINAL OSTEOARTHRITIS COMPLICATION STATUS: ICD-10-CM

## 2019-06-17 DIAGNOSIS — M47.816 LUMBAR SPONDYLOSIS: Primary | ICD-10-CM

## 2019-06-17 DIAGNOSIS — M50.30 DDD (DEGENERATIVE DISC DISEASE), CERVICAL: ICD-10-CM

## 2019-06-17 DIAGNOSIS — M51.36 DDD (DEGENERATIVE DISC DISEASE), LUMBAR: ICD-10-CM

## 2019-06-17 PROCEDURE — 99999 PR PBB SHADOW E&M-EST. PATIENT-LVL II: ICD-10-PCS | Mod: PBBFAC,,, | Performed by: PAIN MEDICINE

## 2019-06-17 PROCEDURE — 99213 OFFICE O/P EST LOW 20 MIN: CPT | Mod: S$GLB,,, | Performed by: PAIN MEDICINE

## 2019-06-17 PROCEDURE — 99999 PR PBB SHADOW E&M-EST. PATIENT-LVL II: CPT | Mod: PBBFAC,,, | Performed by: PAIN MEDICINE

## 2019-06-17 PROCEDURE — 1101F PT FALLS ASSESS-DOCD LE1/YR: CPT | Mod: CPTII,S$GLB,, | Performed by: PAIN MEDICINE

## 2019-06-17 PROCEDURE — 1101F PR PT FALLS ASSESS DOC 0-1 FALLS W/OUT INJ PAST YR: ICD-10-PCS | Mod: CPTII,S$GLB,, | Performed by: PAIN MEDICINE

## 2019-06-17 PROCEDURE — 99213 PR OFFICE/OUTPT VISIT, EST, LEVL III, 20-29 MIN: ICD-10-PCS | Mod: S$GLB,,, | Performed by: PAIN MEDICINE

## 2019-06-17 NOTE — PROGRESS NOTES
Mr. Pate reports miscommunication when discussing MBB efficacy over the phone.  He states he received ~80% reduction in pain with increased mobility which was more evident once the block wore off. His pain returned 10/10, limiting the amount of exercise he can perform at home. He would like to repeat the MBB per insurance policy.     Mr. Pate will be scheduled for bilateral L3, L4, L5 MBB on 07/03/2019.  Reviewed the pre-procedure instructions listed below with him- copy also provided.  Instructed patient to notify clinic if hebegins feeling ill, runs a fever, is prescribed antibiotics, and/or has any outpatient procedures within the two weeks leading up to this procedure.  Instructed patient to report to [facility], 2nd Floor Endoscopy Registration Desk.  All questions answered- patient verbalized understanding.  Clearance to hold ASA previously obtained.     Day of Procedure  - Ensure you have obtained an arrival time from the Pain Management Staff  o Procedure Area will call 1-3 days in advance with your arrival time.  Please check any voicemails received.  o If you arrive past your scheduled procedure time, you may be asked to reschedule your procedure.  - Ensure you have a  with you to remain present throughout your procedure for your safety.  o If you arrive without a responsible adult to stay with you and drive you home, you may be asked to reschedule your procedure.  - Take all of your prescribed medications (exceptions noted below) with a small amount of water  - This is NOT a fasting procedure, you may have a light meal before coming.  - Wear comfortable clothing (loose fitting pants).  - You may wear glasses, dentures, contact lenses, and/or hearing aids. Please remove all jewelry and metal hairpins.  - Notify the nurse during the intake process if you are allergic to any medications, if you are diabetic, or if you are not feeling well  - Contact the Pain Management Clinic with  the following:  o A fever greater than 100° (degrees)   o Feel ill, have any type of infection, or are taking antibiotics now or within the two (2) weeks leading up to this procedure  o Have any outpatient procedures within the two (2) weeks leading up to this procedure (colonoscopy, major dental work, etc.)    IF you are taking blood thinners: Only upon receiving clearance and notification from the Pain Management Department  7 Days Prior to Your Procedure:  - Stop taking Plavix/Clopridogrel, Effient/Prasugel, ASA  5 Days Prior to Your Procedure:  - Stop taking Coumadin/Warfarin.  An INR may be drawn prior to your procedure.  If labs are required, you will need to arrive earlier than your scheduled arrival time.  - Stop taking Pradaxa/Dabigatra,  Brilinta/ Ticagrelor  3 Days Prior to Your Procedure:  - Stop taking Xarelto/Rivaroxaban, Eliquis/Apixaban, Aggrenox/Dipyridamole, Reopro/Abciximab

## 2019-06-17 NOTE — PROGRESS NOTES
Subjective:     Patient ID: Bj Pate Jr. is a 69 y.o. male    Chief Complaint: Low-back Pain (Bilateral)      Referred by: No ref. provider found      HPI:    Interval History (6/17/19):  He returns today for follow up.  He reports that bilateral L3, L4 and L5 medial branch blocks have been helpful for the low back pain. He states that when he was initially asked about the effects of the medial branch blocks he misunderstood the question.  He reports 80% relief of his low back pain on the day of the medial branch blocks.  He denies any changes in the quality location was pain.  He denies any new or worsening symptoms.      Interval History (5/14/19):  He returns today for follow up.  He reports that C7-T1 interlaminar epidural steroid injection has been helpful for the neck and upper extremity pain. He reports about 50% relief of his symptoms.  He denies any changes in the quality location the pain. It today he wishes to discuss his low back pain further.  He has had chronic low back pain for many years.  Pain is located across the lower lumbar region and does not radiate.  He denies any associated numbness, tingling, weakness, bowel or bladder dysfunction.  The pain is constant and worsened with activity.      Initial Encounter (4/11/19):  Bj Pate Jr. is a 69 y.o. male who presents today with chronic neck and low back pain. Will address his neck pain during today's visit.  He states that he has had neck pain for years.  This pain radiates in the bilateral upper extremities and causes numbness in his hands.  This will sometimes wake him from sleep.  The pain is equal on both sides.  He denies any focal weakness.  He denies any bowel bladder dysfunction.  The pain is constant and worsened with activity.  EMG with evidence of chronic denervation in the right C5 through C7 dermatomes.  This pain is described in detail below.    Physical Therapy:  Yes.  Somewhat helpful    Non-pharmacologic Treatment:   Nothing helps         · TENS?  No    Pain Medications:         · Currently taking:  Gabapentin 600 mg t.i.d. p.r.n. (this causes diarrhea), Tylenol, Aleve    · Has tried in the past:      · Has not tried: Opioids, Muscle relaxants, TCAs, SNRIs, topical creams    Blood thinners:  Aspirin 81 mg a day    Interventional Therapies:    5/2/19 - C7-T1 interlaminar epidural steroid injection - 50% relief  5/29/19 - bilateral L3, L4 and L5 medial branch blocks - 80% relief    Relevant Surgeries:  None    Affecting sleep?  Yes    Affecting daily activities? yes    Depressive symptoms? no          · SI/HI? No    Work status: Retired    Pain Scores:    Best:       6/10  Worst:     8/10  Usually:   7/10  Today:    6/10    Review of Systems   Constitutional: Negative for activity change, appetite change, chills, fatigue, fever and unexpected weight change.   HENT: Negative for hearing loss.    Eyes: Negative for visual disturbance.   Respiratory: Negative for chest tightness and shortness of breath.    Cardiovascular: Negative for chest pain.   Gastrointestinal: Negative for abdominal pain, constipation, diarrhea, nausea and vomiting.   Genitourinary: Negative for difficulty urinating.   Musculoskeletal: Positive for back pain, gait problem, myalgias, neck pain and neck stiffness.   Skin: Negative for rash.   Neurological: Positive for numbness. Negative for dizziness, weakness, light-headedness and headaches.   Psychiatric/Behavioral: Positive for sleep disturbance. Negative for hallucinations and suicidal ideas. The patient is not nervous/anxious.        Past Medical History:   Diagnosis Date    Basal ganglia hemorrhage     Left basal ganglia hemorrhage with resultant right-sided hemiparesis which has resolved.     Benign hypertension with CKD (chronic kidney disease) stage III      Cataract     Chronic idiopathic gout of multiple sites     Chronic kidney disease, stage 3     Erectile dysfunction     Hemorrhoids without  complication     Morbid obesity     Obstructive sleep apnea on CPAP     Stroke 2016, 2006    Thalamic infarct, acute (right) 1/2016    Type 2 DM with CKD stage 3 and hypertension     On pravastatin for cardiovascular protection.        Past Surgical History:   Procedure Laterality Date    Injection, Steroid, Epidural Cervical N/A 5/2/2019    Performed by [facility]iel [facility]an Jr[facility] at [facility]    Lumbar Medial Branch Blocks Bilateral 5/29/2019    Performed by [facility]an Jr[facility] at [facility]    NO PAST SURGERIES         Social History     Socioeconomic History    Marital status:      Spouse name: Not on file    Number of children: 3    Years of education: Not on file    Highest education level: Not on file   Occupational History    Occupation:    Social Needs    Financial resource strain: Not on file    Food insecurity:     Worry: Not on file     Inability: Not on file    Transportation needs:     Medical: Not on file     Non-medical: Not on file   Tobacco Use    Smoking status: Never Smoker    Smokeless tobacco: Never Used   Substance and Sexual Activity    Alcohol use: Yes     Alcohol/week: 0.0 oz     Comment: occacionally    Drug use: No    Sexual activity: Not on file   Lifestyle    Physical activity:     Days per week: Not on file     Minutes per session: Not on file    Stress: Not on file   Relationships    Social connections:     Talks on phone: Not on file     Gets together: Not on file     Attends Mandaeism service: Not on file     Active member of club or organization: Not on file     Attends meetings of clubs or organizations: Not on file     Relationship status: Not on file   Other Topics Concern    Not on file   Social History Narrative    Not on file       Review of patient's allergies indicates:   Allergen Reactions    Tomato (solanum lycopersicum) Hives    Naproxen Hives    Shrimp Other (See Comments)       Current Outpatient Medications on  File Prior to Visit   Medication Sig Dispense Refill    atorvastatin (LIPITOR) 40 MG tablet TAKE 1 TABLET BY MOUTH ONCE DAILY 90 tablet 0    blood pressure test kit-large Kit 1 Device by Misc.(Non-Drug; Combo Route) route 2 (two) times daily. 1 each 0    blood sugar diagnostic Strp To check BG 2 times daily, to use with insurance preferred meter 200 strip 3    blood-glucose meter kit To check BG 2 times daily, to use with insurance preferred meter 1 each 0    CARTIA  mg 24 hr capsule TAKE 1 CAPSULE BY MOUTH ONCE DAILY 90 capsule 0    diclofenac sodium (VOLTAREN) 1 % Gel APPLY  2 GRAMS  TOPICALLY TO AFFECTED AREA ONCE DAILY 300 g 0    fluticasone (FLONASE) 50 mcg/actuation nasal spray       fluticasone-salmeterol diskus inhaler 250-50 mcg INHALE ONE PUFF INTO LUNGS TWICE DAILY 60 each 2    gabapentin (NEURONTIN) 600 MG tablet Take 1 tablet (600 mg total) by mouth 3 (three) times daily. 90 tablet 11    lancets Misc To check BG 2 times daily, to use with insurance preferred meter 200 each 3    lisinopril-hydrochlorothiazide (PRINZIDE,ZESTORETIC) 20-25 mg Tab Take 1 tablet by mouth once daily. 90 tablet 1    acetaminophen-codeine 300-30mg (TYLENOL #3) 300-30 mg Tab as needed.       albuterol 90 mcg/actuation inhaler Inhale 2 puffs into the lungs every 6 (six) hours as needed for Wheezing. 1 Inhaler 11    aspirin 325 MG tablet Take 1 tablet (325 mg total) by mouth once daily. (Patient taking differently: Take 81 mg by mouth once daily. )  0    clobetasol 0.05% (TEMOVATE) 0.05 % Oint Apply topically 2 (two) times daily. 45 g 5    COLCRYS 0.6 mg tablet TAKE 2 TABLETS BY MOUTH AT THE FIRST SIGN OF GOUT FLARE THEN 1 TABLET 1 HOUR LATER 30 tablet 2    COLCRYS 0.6 mg tablet TAKE 2 TABLETS BY MOUTH BY MOUTH AT  THE  FIRST  SIGN  OF  GOUT  THEN  1  TAB  1  HOUR  LATER 30 tablet 2    diazePAM (VALIUM) 10 MG Tab Take 10 mg by mouth once daily.   0    FLUAD [id number], 65 YR UP,,PF, 45 mcg (15 mcg x 3)/0.5  mL Syrg ADM 0.5ML IM UTD  0    fluticasone-salmeterol diskus inhaler 250-50 mcg INHALE 1 PUFF INTO LUNGS TWICE DAILY 60 each 0    FLUZONE HIGH-DOSE 2017-18, PF, 180 mcg/0.5 mL vaccine ADM 0.5ML IM UTD  0    levocetirizine (XYZAL) 5 MG tablet TAKE ONE TABLET BY MOUTH ONCE DAILY IN THE EVENING 30 tablet 11    [DISCONTINUED] nystatin (MYCOSTATIN) cream APPLY   TOPICALLY TO AFFECTED AREA TWICE DAILY 60 g 0     No current facility-administered medications on file prior to visit.        Objective:      There were no vitals taken for this visit.    Exam:  GEN:  Well developed, well nourished.  No acute distress.   HEENT:  No trauma.  Mucous membranes moist.  Nares patent bilaterally.  PSYCH: Normal affect. Thought content appropriate.  CHEST:  Breathing symmetric.  No audible wheezing.  ABD: Soft, non-distended.  SKIN:  Warm, pink, dry.  No rash on exposed areas.    EXT:  No cyanosis, clubbing, or edema.  No color change or changes in nail or hair growth.  NEURO/MUSCULOSKELETAL:  Fully alert, oriented, and appropriate. Speech normal pérez. No cranial nerve deficits.   Gait:  Normal.  No focal motor deficits.               Imaging:  Narrative     EXAMINATION:  MRI CERVICAL SPINE WITHOUT CONTRAST    CLINICAL HISTORY:  Cervical radiculopathy; Carpal tunnel syndrome, bilateral upper limbs    TECHNIQUE:  Multiplanar, multisequence MR images of the cervical spine were performed without the administration of contrast.  Exam significantly limited secondary to patient motion, body habitus, and technical inability to place coil properly.    COMPARISON:  None.    FINDINGS:  Alignment: Mild straightening of normal cervical lordosis.  There is minimal suspected retrolisthesis of C3 on C4 and C4 on C5.    Vertebrae: Normal marrow signal. No fracture.    Discs: Intervertebral disc height loss and degenerative signal change.    Cord: Normal.    Skull base and craniocervical junction: Normal.    Degenerative findings:    C2-C3:  Posterior disc osteophyte complex and moderate left facet arthropathy resulting in moderate left neural foraminal narrowing.  No spinal canal stenosis.    C3-C4: Posterior disc osteophyte complex and facet arthropathy resulting in mild spinal canal stenosis, moderate left neural foraminal narrowing, and mild right neural foraminal narrowing.    C4-C5: Posterior disc osteophyte complex and facet arthropathy resulting in moderate spinal canal stenosis, severe left neural foraminal narrowing, and mild right neural foraminal narrowing.    C5-C6: Posterior disc osteophyte complex and facet arthropathy resulting in moderate spinal canal stenosis and moderate bilateral neural foraminal narrowing.    C6-C7: Posterior disc osteophyte complex and facet arthropathy resulting in mild-to-moderate moderate spinal canal stenosis and severe bilateral neural foraminal narrowing.    C7-T1: Posterior disc osteophyte complex and facet arthropathy resulting in mild spinal canal stenosis, severe right neural foraminal narrowing, and moderate left neural foraminal narrowing.    Paraspinal muscles & soft tissues: Unremarkable.   Impression       Exam significantly limited secondary to patient motion, body habitus, and technical inability to place coil.    Multilevel degenerative change of the cervical spine with multiple areas of mild to moderate spinal canal stenosis and moderate-severe neural foraminal narrowing as detailed above.    Electronically signed by resident: Isidro Mackenzie  Date: 01/17/2019  Time: 09:45    Electronically signed by: Raymond Plasencia MD  Date: 01/17/2019  Time: 11:39         Assessment:       Encounter Diagnoses   Name Primary?    Lumbar spondylosis Yes    DDD (degenerative disc disease), lumbar     DDD (degenerative disc disease), cervical     Osteoarthritis of cervical spine, unspecified spinal osteoarthritis complication status          Plan:       Bj was seen today for low-back pain.    Diagnoses and all  orders for this visit:    Lumbar spondylosis  -     Case request GI: Lumbar Medial Branch Blocks    DDD (degenerative disc disease), lumbar    DDD (degenerative disc disease), cervical    Osteoarthritis of cervical spine, unspecified spinal osteoarthritis complication status        Bj Pate Jr. is a 69 y.o. male with chronic bilateral low back pain.  Low back pain appears to be axial and most likely related to facet joints.  Greater than 80% relief noted bilateral L3, L4 and L5 medial branch blocks.    1.  Schedule for repeat bilateral L3, L4 and L5 medial branch blocks.  If diagnostic can proceed with radiofrequency ablation.  2.  Return to clinic 2 weeks after procedure to discuss results.

## 2019-06-17 NOTE — H&P (VIEW-ONLY)
Subjective:     Patient ID: Bj Pate Jr. is a 69 y.o. male    Chief Complaint: Low-back Pain (Bilateral)      Referred by: No ref. provider found      HPI:    Interval History (6/17/19):  He returns today for follow up.  He reports that bilateral L3, L4 and L5 medial branch blocks have been helpful for the low back pain. He states that when he was initially asked about the effects of the medial branch blocks he misunderstood the question.  He reports 80% relief of his low back pain on the day of the medial branch blocks.  He denies any changes in the quality location was pain.  He denies any new or worsening symptoms.      Interval History (5/14/19):  He returns today for follow up.  He reports that C7-T1 interlaminar epidural steroid injection has been helpful for the neck and upper extremity pain. He reports about 50% relief of his symptoms.  He denies any changes in the quality location the pain. It today he wishes to discuss his low back pain further.  He has had chronic low back pain for many years.  Pain is located across the lower lumbar region and does not radiate.  He denies any associated numbness, tingling, weakness, bowel or bladder dysfunction.  The pain is constant and worsened with activity.      Initial Encounter (4/11/19):  Bj Pate Jr. is a 69 y.o. male who presents today with chronic neck and low back pain. Will address his neck pain during today's visit.  He states that he has had neck pain for years.  This pain radiates in the bilateral upper extremities and causes numbness in his hands.  This will sometimes wake him from sleep.  The pain is equal on both sides.  He denies any focal weakness.  He denies any bowel bladder dysfunction.  The pain is constant and worsened with activity.  EMG with evidence of chronic denervation in the right C5 through C7 dermatomes.  This pain is described in detail below.    Physical Therapy:  Yes.  Somewhat helpful    Non-pharmacologic Treatment:   Nothing helps         · TENS?  No    Pain Medications:         · Currently taking:  Gabapentin 600 mg t.i.d. p.r.n. (this causes diarrhea), Tylenol, Aleve    · Has tried in the past:      · Has not tried: Opioids, Muscle relaxants, TCAs, SNRIs, topical creams    Blood thinners:  Aspirin 81 mg a day    Interventional Therapies:    5/2/19 - C7-T1 interlaminar epidural steroid injection - 50% relief  5/29/19 - bilateral L3, L4 and L5 medial branch blocks - 80% relief    Relevant Surgeries:  None    Affecting sleep?  Yes    Affecting daily activities? yes    Depressive symptoms? no          · SI/HI? No    Work status: Retired    Pain Scores:    Best:       6/10  Worst:     8/10  Usually:   7/10  Today:    6/10    Review of Systems   Constitutional: Negative for activity change, appetite change, chills, fatigue, fever and unexpected weight change.   HENT: Negative for hearing loss.    Eyes: Negative for visual disturbance.   Respiratory: Negative for chest tightness and shortness of breath.    Cardiovascular: Negative for chest pain.   Gastrointestinal: Negative for abdominal pain, constipation, diarrhea, nausea and vomiting.   Genitourinary: Negative for difficulty urinating.   Musculoskeletal: Positive for back pain, gait problem, myalgias, neck pain and neck stiffness.   Skin: Negative for rash.   Neurological: Positive for numbness. Negative for dizziness, weakness, light-headedness and headaches.   Psychiatric/Behavioral: Positive for sleep disturbance. Negative for hallucinations and suicidal ideas. The patient is not nervous/anxious.        Past Medical History:   Diagnosis Date    Basal ganglia hemorrhage     Left basal ganglia hemorrhage with resultant right-sided hemiparesis which has resolved.     Benign hypertension with CKD (chronic kidney disease) stage III      Cataract     Chronic idiopathic gout of multiple sites     Chronic kidney disease, stage 3     Erectile dysfunction     Hemorrhoids without  complication     Morbid obesity     Obstructive sleep apnea on CPAP     Stroke 2016, 2006    Thalamic infarct, acute (right) 1/2016    Type 2 DM with CKD stage 3 and hypertension     On pravastatin for cardiovascular protection.        Past Surgical History:   Procedure Laterality Date    Injection, Steroid, Epidural Cervical N/A 5/2/2019    Performed by [facility]iel Doan Jr[facility] at [facility]    Lumbar Medial Branch Blocks Bilateral 5/29/2019    Performed by [facility]iel [facility]an Jr., [facility] at [facility]    NO PAST SURGERIES         Social History     Socioeconomic History    Marital status:      Spouse name: Not on file    Number of children: 3    Years of education: Not on file    Highest education level: Not on file   Occupational History    Occupation:    Social Needs    Financial resource strain: Not on file    Food insecurity:     Worry: Not on file     Inability: Not on file    Transportation needs:     Medical: Not on file     Non-medical: Not on file   Tobacco Use    Smoking status: Never Smoker    Smokeless tobacco: Never Used   Substance and Sexual Activity    Alcohol use: Yes     Alcohol/week: 0.0 oz     Comment: occacionally    Drug use: No    Sexual activity: Not on file   Lifestyle    Physical activity:     Days per week: Not on file     Minutes per session: Not on file    Stress: Not on file   Relationships    Social connections:     Talks on phone: Not on file     Gets together: Not on file     Attends Caodaism service: Not on file     Active member of club or organization: Not on file     Attends meetings of clubs or organizations: Not on file     Relationship status: Not on file   Other Topics Concern    Not on file   Social History Narrative    Not on file       Review of patient's allergies indicates:   Allergen Reactions    Tomato (solanum lycopersicum) Hives    Naproxen Hives    Shrimp Other (See Comments)       Current Outpatient Medications on  File Prior to Visit   Medication Sig Dispense Refill    atorvastatin (LIPITOR) 40 MG tablet TAKE 1 TABLET BY MOUTH ONCE DAILY 90 tablet 0    blood pressure test kit-large Kit 1 Device by Misc.(Non-Drug; Combo Route) route 2 (two) times daily. 1 each 0    blood sugar diagnostic Strp To check BG 2 times daily, to use with insurance preferred meter 200 strip 3    blood-glucose meter kit To check BG 2 times daily, to use with insurance preferred meter 1 each 0    CARTIA  mg 24 hr capsule TAKE 1 CAPSULE BY MOUTH ONCE DAILY 90 capsule 0    diclofenac sodium (VOLTAREN) 1 % Gel APPLY  2 GRAMS  TOPICALLY TO AFFECTED AREA ONCE DAILY 300 g 0    fluticasone (FLONASE) 50 mcg/actuation nasal spray       fluticasone-salmeterol diskus inhaler 250-50 mcg INHALE ONE PUFF INTO LUNGS TWICE DAILY 60 each 2    gabapentin (NEURONTIN) 600 MG tablet Take 1 tablet (600 mg total) by mouth 3 (three) times daily. 90 tablet 11    lancets Misc To check BG 2 times daily, to use with insurance preferred meter 200 each 3    lisinopril-hydrochlorothiazide (PRINZIDE,ZESTORETIC) 20-25 mg Tab Take 1 tablet by mouth once daily. 90 tablet 1    acetaminophen-codeine 300-30mg (TYLENOL #3) 300-30 mg Tab as needed.       albuterol 90 mcg/actuation inhaler Inhale 2 puffs into the lungs every 6 (six) hours as needed for Wheezing. 1 Inhaler 11    aspirin 325 MG tablet Take 1 tablet (325 mg total) by mouth once daily. (Patient taking differently: Take 81 mg by mouth once daily. )  0    clobetasol 0.05% (TEMOVATE) 0.05 % Oint Apply topically 2 (two) times daily. 45 g 5    COLCRYS 0.6 mg tablet TAKE 2 TABLETS BY MOUTH AT THE FIRST SIGN OF GOUT FLARE THEN 1 TABLET 1 HOUR LATER 30 tablet 2    COLCRYS 0.6 mg tablet TAKE 2 TABLETS BY MOUTH BY MOUTH AT  THE  FIRST  SIGN  OF  GOUT  THEN  1  TAB  1  HOUR  LATER 30 tablet 2    diazePAM (VALIUM) 10 MG Tab Take 10 mg by mouth once daily.   0    FLUAD [id number], 65 YR UP,,PF, 45 mcg (15 mcg x 3)/0.5  mL Syrg ADM 0.5ML IM UTD  0    fluticasone-salmeterol diskus inhaler 250-50 mcg INHALE 1 PUFF INTO LUNGS TWICE DAILY 60 each 0    FLUZONE HIGH-DOSE 2017-18, PF, 180 mcg/0.5 mL vaccine ADM 0.5ML IM UTD  0    levocetirizine (XYZAL) 5 MG tablet TAKE ONE TABLET BY MOUTH ONCE DAILY IN THE EVENING 30 tablet 11    [DISCONTINUED] nystatin (MYCOSTATIN) cream APPLY   TOPICALLY TO AFFECTED AREA TWICE DAILY 60 g 0     No current facility-administered medications on file prior to visit.        Objective:      There were no vitals taken for this visit.    Exam:  GEN:  Well developed, well nourished.  No acute distress.   HEENT:  No trauma.  Mucous membranes moist.  Nares patent bilaterally.  PSYCH: Normal affect. Thought content appropriate.  CHEST:  Breathing symmetric.  No audible wheezing.  ABD: Soft, non-distended.  SKIN:  Warm, pink, dry.  No rash on exposed areas.    EXT:  No cyanosis, clubbing, or edema.  No color change or changes in nail or hair growth.  NEURO/MUSCULOSKELETAL:  Fully alert, oriented, and appropriate. Speech normal pérez. No cranial nerve deficits.   Gait:  Normal.  No focal motor deficits.               Imaging:  Narrative     EXAMINATION:  MRI CERVICAL SPINE WITHOUT CONTRAST    CLINICAL HISTORY:  Cervical radiculopathy; Carpal tunnel syndrome, bilateral upper limbs    TECHNIQUE:  Multiplanar, multisequence MR images of the cervical spine were performed without the administration of contrast.  Exam significantly limited secondary to patient motion, body habitus, and technical inability to place coil properly.    COMPARISON:  None.    FINDINGS:  Alignment: Mild straightening of normal cervical lordosis.  There is minimal suspected retrolisthesis of C3 on C4 and C4 on C5.    Vertebrae: Normal marrow signal. No fracture.    Discs: Intervertebral disc height loss and degenerative signal change.    Cord: Normal.    Skull base and craniocervical junction: Normal.    Degenerative findings:    C2-C3:  Posterior disc osteophyte complex and moderate left facet arthropathy resulting in moderate left neural foraminal narrowing.  No spinal canal stenosis.    C3-C4: Posterior disc osteophyte complex and facet arthropathy resulting in mild spinal canal stenosis, moderate left neural foraminal narrowing, and mild right neural foraminal narrowing.    C4-C5: Posterior disc osteophyte complex and facet arthropathy resulting in moderate spinal canal stenosis, severe left neural foraminal narrowing, and mild right neural foraminal narrowing.    C5-C6: Posterior disc osteophyte complex and facet arthropathy resulting in moderate spinal canal stenosis and moderate bilateral neural foraminal narrowing.    C6-C7: Posterior disc osteophyte complex and facet arthropathy resulting in mild-to-moderate moderate spinal canal stenosis and severe bilateral neural foraminal narrowing.    C7-T1: Posterior disc osteophyte complex and facet arthropathy resulting in mild spinal canal stenosis, severe right neural foraminal narrowing, and moderate left neural foraminal narrowing.    Paraspinal muscles & soft tissues: Unremarkable.   Impression       Exam significantly limited secondary to patient motion, body habitus, and technical inability to place coil.    Multilevel degenerative change of the cervical spine with multiple areas of mild to moderate spinal canal stenosis and moderate-severe neural foraminal narrowing as detailed above.    Electronically signed by resident: Isidro Mackenzie  Date: 01/17/2019  Time: 09:45    Electronically signed by: Raymond Plasencia MD  Date: 01/17/2019  Time: 11:39         Assessment:       Encounter Diagnoses   Name Primary?    Lumbar spondylosis Yes    DDD (degenerative disc disease), lumbar     DDD (degenerative disc disease), cervical     Osteoarthritis of cervical spine, unspecified spinal osteoarthritis complication status          Plan:       Bj was seen today for low-back pain.    Diagnoses and all  orders for this visit:    Lumbar spondylosis  -     Case request GI: Lumbar Medial Branch Blocks    DDD (degenerative disc disease), lumbar    DDD (degenerative disc disease), cervical    Osteoarthritis of cervical spine, unspecified spinal osteoarthritis complication status        Bj Pate Jr. is a 69 y.o. male with chronic bilateral low back pain.  Low back pain appears to be axial and most likely related to facet joints.  Greater than 80% relief noted bilateral L3, L4 and L5 medial branch blocks.    1.  Schedule for repeat bilateral L3, L4 and L5 medial branch blocks.  If diagnostic can proceed with radiofrequency ablation.  2.  Return to clinic 2 weeks after procedure to discuss results.

## 2019-06-18 ENCOUNTER — TELEPHONE (OUTPATIENT)
Dept: FAMILY MEDICINE | Facility: CLINIC | Age: 69
End: 2019-06-18

## 2019-06-18 NOTE — TELEPHONE ENCOUNTER
----- Message from Letirivera Andrade sent at 6/18/2019 12:15 PM CDT -----  Contact: [facility] -  Type: Patient Call Back    Who called: Joyce    What is the request in detail: Calling to check the status of a fax that was sent to the office.     Can the clinic reply by [facility]? Call   Would the patient rather a call back or a response via [facility]?  Call     Best call back number:[phone]

## 2019-06-18 NOTE — TELEPHONE ENCOUNTER
Returned phone call was told that order still pending is on provider desk , verbalized understand .

## 2019-06-25 ENCOUNTER — TELEPHONE (OUTPATIENT)
Dept: PAIN MEDICINE | Facility: CLINIC | Age: 69
End: 2019-06-25

## 2019-06-25 NOTE — TELEPHONE ENCOUNTER
Message left informing patient that today will be the last dose of ASA until after the pain management procedure scheduled next week.

## 2019-06-28 ENCOUNTER — TELEPHONE (OUTPATIENT)
Dept: PAIN MEDICINE | Facility: CLINIC | Age: 69
End: 2019-06-28

## 2019-06-28 NOTE — TELEPHONE ENCOUNTER
Reviewed pre-procedure instructions and provided arrival time of 0930.  All questions answered.  Patient verbalized understanding.

## 2019-07-03 ENCOUNTER — HOSPITAL ENCOUNTER (OUTPATIENT)
Facility: HOSPITAL | Age: 69
Discharge: HOME | End: 2019-07-03
Attending: PAIN MEDICINE | Admitting: PAIN MEDICINE
Payer: MEDICARE

## 2019-07-03 ENCOUNTER — HOSPITAL ENCOUNTER (OUTPATIENT)
Dept: RADIOLOGY | Facility: HOSPITAL | Age: 69
Discharge: HOME | End: 2019-07-03
Attending: PAIN MEDICINE | Admitting: PAIN MEDICINE
Payer: MEDICARE

## 2019-07-03 VITALS
DIASTOLIC BLOOD PRESSURE: 70 MMHG | HEART RATE: 74 BPM | TEMPERATURE: 98 F | RESPIRATION RATE: 18 BRPM | OXYGEN SATURATION: 98 % | SYSTOLIC BLOOD PRESSURE: 140 MMHG

## 2019-07-03 DIAGNOSIS — M47.816 LUMBAR SPONDYLOSIS: ICD-10-CM

## 2019-07-03 DIAGNOSIS — M47.816 LUMBAR SPONDYLOSIS: Primary | ICD-10-CM

## 2019-07-03 PROCEDURE — 64494 PR INJ DX/THER AGNT PARAVERT FACET JOINT,IMG GUIDE,LUMBAR/SAC, 2ND LEVEL: ICD-10-PCS | Mod: 50,,, | Performed by: PAIN MEDICINE

## 2019-07-03 PROCEDURE — 64493 INJ PARAVERT F JNT L/S 1 LEV: CPT

## 2019-07-03 PROCEDURE — 25000003 PHARM REV CODE 250: Performed by: PAIN MEDICINE

## 2019-07-03 PROCEDURE — 64493 INJ PARAVERT F JNT L/S 1 LEV: CPT | Mod: 50,,, | Performed by: PAIN MEDICINE

## 2019-07-03 PROCEDURE — 64493 PR INJ DX/THER AGNT PARAVERT FACET JOINT,IMG GUIDE,LUMBAR/SAC,1ST LVL: ICD-10-PCS | Mod: 50,,, | Performed by: PAIN MEDICINE

## 2019-07-03 PROCEDURE — 64494 INJ PARAVERT F JNT L/S 2 LEV: CPT

## 2019-07-03 PROCEDURE — 64494 INJ PARAVERT F JNT L/S 2 LEV: CPT | Mod: 50,,, | Performed by: PAIN MEDICINE

## 2019-07-03 RX ORDER — BUPIVACAINE HYDROCHLORIDE 2.5 MG/ML
INJECTION, SOLUTION EPIDURAL; INFILTRATION; INTRACAUDAL
Status: DISCONTINUED
Start: 2019-07-03 | End: 2019-07-03 | Stop reason: HOSPADM

## 2019-07-03 RX ORDER — LIDOCAINE HYDROCHLORIDE 20 MG/ML
INJECTION, SOLUTION INFILTRATION; PERINEURAL
Status: DISCONTINUED
Start: 2019-07-03 | End: 2019-07-03 | Stop reason: HOSPADM

## 2019-07-03 RX ORDER — LIDOCAINE HYDROCHLORIDE 20 MG/ML
10 INJECTION, SOLUTION INFILTRATION; PERINEURAL ONCE
Status: COMPLETED | OUTPATIENT
Start: 2019-07-03 | End: 2019-07-03

## 2019-07-03 RX ORDER — BUPIVACAINE HYDROCHLORIDE 2.5 MG/ML
10 INJECTION, SOLUTION EPIDURAL; INFILTRATION; INTRACAUDAL ONCE
Status: COMPLETED | OUTPATIENT
Start: 2019-07-03 | End: 2019-07-03

## 2019-07-03 NOTE — DISCHARGE INSTRUCTIONS

## 2019-07-03 NOTE — OP NOTE
LUMBAR Medial Branch Block Under Fluoroscopy  Time-out taken to identify patient and procedure side prior to starting the procedure.   I attest that I have reviewed the patient's home medications prior to the procedure and no contraindication have been identified. I  re-evaluated the patient after the patient was positioned for the procedure in the procedure room immediately before the procedural time-out. The vital signs are current and represent the current state of the patient which has not significantly changed since the preprocedure assessment.            Date of Service: 07/03/2019    PCP: Azikiwe K Lombard, MD    Referring Physician:                                                           PROCEDURE:  Bilateral  L3, L4 and L5 medial branch block    REASON FOR PROCEDURE: Lumbar spondylosis [M47.816]    PHYSICIAN: Dariel Doan MD  ASSISTANTS: None    MEDICATIONS INJECTED: Bupivicaine 0.25% 1.5ml at each level      LOCAL ANESTHETIC USED: Xylocaine 2% 9ml with Sodium Bicarbonate 1ml.  3ml each site.    SEDATION MEDICATIONS: None    ESTIMATED BLOOD LOSS:  None.    COMPLICATIONS:  None.    TECHNIQUE: Laying in a prone position, the patient was prepped and draped in the usual sterile fashion using ChloraPrep and fenestrated drape.  The level was determined under fluoroscopic guidance.  Local anesthetic was given by going down to the hub of the 27-gauge 1.25in needle and raising a wheel.  A 22-gauge 5 inch needle was introduced to the anatomic local of the medial branch at the lateral mass utilizing live fluoroscopy. Medication was then injected slowly at each level as stated above. The patient tolerated the procedure well.       The patient was monitored after the procedure.  Patient was given post procedure and discharge instructions to follow at home.  We will see the patient back in two weeks or the patient may call to inform of status. The patient was discharged in a stable condition

## 2019-07-05 ENCOUNTER — PATIENT OUTREACH (OUTPATIENT)
Dept: ADMINISTRATIVE | Facility: OTHER | Age: 69
End: 2019-07-05

## 2019-07-05 ENCOUNTER — TELEPHONE (OUTPATIENT)
Dept: PAIN MEDICINE | Facility: CLINIC | Age: 69
End: 2019-07-05

## 2019-07-05 NOTE — TELEPHONE ENCOUNTER
Mr. Pate reports 60-70% reduction in lower back pain following MBB performed 7/3/19.  The pain has since returned to 7/10.  However, he states the upper back pain is worse at this time in comparison.  Dr. Doan updated.  Patient scheduled for a clinic appt to discuss.

## 2019-07-08 ENCOUNTER — OFFICE VISIT (OUTPATIENT)
Dept: PAIN MEDICINE | Facility: CLINIC | Age: 69
End: 2019-07-08
Payer: MEDICARE

## 2019-07-08 VITALS
BODY MASS INDEX: 46.65 KG/M2 | DIASTOLIC BLOOD PRESSURE: 70 MMHG | HEIGHT: 69 IN | SYSTOLIC BLOOD PRESSURE: 151 MMHG | HEART RATE: 70 BPM | RESPIRATION RATE: 18 BRPM | WEIGHT: 315 LBS | OXYGEN SATURATION: 98 %

## 2019-07-08 DIAGNOSIS — M50.30 DDD (DEGENERATIVE DISC DISEASE), CERVICAL: Primary | ICD-10-CM

## 2019-07-08 DIAGNOSIS — M47.22 OSTEOARTHRITIS OF SPINE WITH RADICULOPATHY, CERVICAL REGION: ICD-10-CM

## 2019-07-08 DIAGNOSIS — M54.12 CERVICAL RADICULOPATHY: ICD-10-CM

## 2019-07-08 PROCEDURE — 1101F PT FALLS ASSESS-DOCD LE1/YR: CPT | Mod: CPTII,S$GLB,, | Performed by: PAIN MEDICINE

## 2019-07-08 PROCEDURE — 3078F DIAST BP <80 MM HG: CPT | Mod: CPTII,S$GLB,, | Performed by: PAIN MEDICINE

## 2019-07-08 PROCEDURE — 3078F PR MOST RECENT DIASTOLIC BLOOD PRESSURE < 80 MM HG: ICD-10-PCS | Mod: CPTII,S$GLB,, | Performed by: PAIN MEDICINE

## 2019-07-08 PROCEDURE — 3077F PR MOST RECENT SYSTOLIC BLOOD PRESSURE >= 140 MM HG: ICD-10-PCS | Mod: CPTII,S$GLB,, | Performed by: PAIN MEDICINE

## 2019-07-08 PROCEDURE — 99999 PR PBB SHADOW E&M-EST. PATIENT-LVL III: ICD-10-PCS | Mod: PBBFAC,,, | Performed by: PAIN MEDICINE

## 2019-07-08 PROCEDURE — 3077F SYST BP >= 140 MM HG: CPT | Mod: CPTII,S$GLB,, | Performed by: PAIN MEDICINE

## 2019-07-08 PROCEDURE — 99213 PR OFFICE/OUTPT VISIT, EST, LEVL III, 20-29 MIN: ICD-10-PCS | Mod: S$GLB,,, | Performed by: PAIN MEDICINE

## 2019-07-08 PROCEDURE — 99213 OFFICE O/P EST LOW 20 MIN: CPT | Mod: S$GLB,,, | Performed by: PAIN MEDICINE

## 2019-07-08 PROCEDURE — 1101F PR PT FALLS ASSESS DOC 0-1 FALLS W/OUT INJ PAST YR: ICD-10-PCS | Mod: CPTII,S$GLB,, | Performed by: PAIN MEDICINE

## 2019-07-08 PROCEDURE — 99999 PR PBB SHADOW E&M-EST. PATIENT-LVL III: CPT | Mod: PBBFAC,,, | Performed by: PAIN MEDICINE

## 2019-07-08 NOTE — PROGRESS NOTES
Subjective:     Patient ID: Bj Pate Jr. is a 69 y.o. male    Chief Complaint: Follow-up (Discuss upper back pain/discomfort)        Referred by: No ref. provider found      HPI:    Interval History (7/8/19):  He returns today for follow up.  He reports that his neck and upper extremity pain/numbness has returned.  He denies any changes in the quality location of this problem.  He denies any new or worsening symptoms.  He states that previous cervical epidural steroid injection provided roughly 50% relief but for no more than 2 weeks.  He states that he is not interested in surgery at this time.      Interval History (6/17/19):  He returns today for follow up.  He reports that bilateral L3, L4 and L5 medial branch blocks have been helpful for the low back pain. He states that when he was initially asked about the effects of the medial branch blocks he misunderstood the question.  He reports 80% relief of his low back pain on the day of the medial branch blocks.  He denies any changes in the quality location was pain.  He denies any new or worsening symptoms.      Interval History (5/14/19):  He returns today for follow up.  He reports that C7-T1 interlaminar epidural steroid injection has been helpful for the neck and upper extremity pain. He reports about 50% relief of his symptoms.  He denies any changes in the quality location the pain. It today he wishes to discuss his low back pain further.  He has had chronic low back pain for many years.  Pain is located across the lower lumbar region and does not radiate.  He denies any associated numbness, tingling, weakness, bowel or bladder dysfunction.  The pain is constant and worsened with activity.      Initial Encounter (4/11/19):  Bj Pate Jr. is a 69 y.o. male who presents today with chronic neck and low back pain. Will address his neck pain during today's visit.  He states that he has had neck pain for years.  This pain radiates in the bilateral upper  extremities and causes numbness in his hands.  This will sometimes wake him from sleep.  The pain is equal on both sides.  He denies any focal weakness.  He denies any bowel bladder dysfunction.  The pain is constant and worsened with activity.  EMG with evidence of chronic denervation in the right C5 through C7 dermatomes.  This pain is described in detail below.    Physical Therapy:  Yes.  Somewhat helpful    Non-pharmacologic Treatment:  Nothing helps         · TENS?  No    Pain Medications:         · Currently taking:  Gabapentin 600 mg t.i.d. p.r.n. (this causes diarrhea), Tylenol, Aleve    · Has tried in the past:      · Has not tried: Opioids, Muscle relaxants, TCAs, SNRIs, topical creams    Blood thinners:  Aspirin 81 mg a day    Interventional Therapies:    5/2/19 - C7-T1 interlaminar epidural steroid injection - 50% relief for 2 weeks  5/29/19 - bilateral L3, L4 and L5 medial branch blocks - 80% relief  7/3/19 - bilateral L3, L4 and L5 medial branch blocks - 80% relief    Relevant Surgeries:  None    Affecting sleep?  Yes    Affecting daily activities? yes    Depressive symptoms? no          · SI/HI? No    Work status: Retired    Pain Scores:    Best:       6/10  Worst:     8/10  Usually:   7/10  Today:    6/10    Review of Systems   Constitutional: Negative for activity change, appetite change, chills, fatigue, fever and unexpected weight change.   HENT: Negative for hearing loss.    Eyes: Negative for visual disturbance.   Respiratory: Negative for chest tightness and shortness of breath.    Cardiovascular: Negative for chest pain.   Gastrointestinal: Negative for abdominal pain, constipation, diarrhea, nausea and vomiting.   Genitourinary: Negative for difficulty urinating.   Musculoskeletal: Positive for back pain, gait problem, myalgias, neck pain and neck stiffness.   Skin: Negative for rash.   Neurological: Positive for numbness. Negative for dizziness, weakness, light-headedness and headaches.    Psychiatric/Behavioral: Positive for sleep disturbance. Negative for hallucinations and suicidal ideas. The patient is not nervous/anxious.        Past Medical History:   Diagnosis Date    Basal ganglia hemorrhage     Left basal ganglia hemorrhage with resultant right-sided hemiparesis which has resolved.    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        Past Surgical History:   Procedure Laterality Date    Injection, Steroid, Epidural Cervical N/A 5/2/2019    Performed by Dariel Doan Jr., [facility] at [facility]    Lumbar Medial Branch Blocks Bilateral 7/3/2019    Performed by [facility] at [facility]    Lumbar Medial Branch Blocks Bilateral 5/29/2019    Performed by [facility]an Jr[facility] at [facility]    NO PAST SURGERIES         Social History     Socioeconomic History    Marital status:      Spouse name: Not on file    Number of children: 3    Years of education: Not on file    Highest education level: Not on file   Occupational History    Occupation:    Social Needs    Financial resource strain: Not on file    Food insecurity:     Worry: Not on file     Inability: Not on file    Transportation needs:     Medical: Not on file     Non-medical: Not on file   Tobacco Use    Smoking status: Never Smoker    Smokeless tobacco: Never Used   Substance and Sexual Activity    Alcohol use: Yes     Alcohol/week: 0.0 oz     Comment: occacionally    Drug use: No    Sexual activity: Not on file   Lifestyle    Physical activity:     Days per week: Not on file      Minutes per session: Not on file    Stress: Not on file   Relationships    Social connections:     Talks on phone: Not on file     Gets together: Not on file     Attends Orthodox service: Not on file     Active member of club or organization: Not on file     Attends meetings of clubs or organizations: Not on file     Relationship status: Not on file   Other Topics Concern    Not on file   Social History Narrative    Not on file       Review of patient's allergies indicates:   Allergen Reactions    Tomato (solanum lycopersicum) Hives    Naproxen Hives    Shrimp Other (See Comments)       Current Outpatient Medications on File Prior to Visit   Medication Sig Dispense Refill    acetaminophen-codeine 300-30mg (TYLENOL #3) 300-30 mg Tab as needed.       albuterol 90 mcg/actuation inhaler Inhale 2 puffs into the lungs every 6 (six) hours as needed for Wheezing. 1 Inhaler 11    aspirin 325 MG tablet Take 1 tablet (325 mg total) by mouth once daily. (Patient taking differently: Take 81 mg by mouth once daily. )  0    atorvastatin (LIPITOR) 40 MG tablet TAKE 1 TABLET BY MOUTH ONCE DAILY 90 tablet 0    blood pressure test kit-large Kit 1 Device by Misc.(Non-Drug; Combo Route) route 2 (two) times daily. 1 each 0    blood sugar diagnostic Strp To check BG 2 times daily, to use with insurance preferred meter 200 strip 3    blood-glucose meter kit To check BG 2 times daily, to use with insurance preferred meter 1 each 0    CARTIA  mg 24 hr capsule TAKE 1 CAPSULE BY MOUTH ONCE DAILY 90 capsule 0    clobetasol 0.05% (TEMOVATE) 0.05 % Oint Apply topically 2 (two) times daily. 45 g 5    COLCRYS 0.6 mg tablet TAKE 2 TABLETS BY MOUTH AT THE FIRST SIGN OF GOUT FLARE THEN 1 TABLET 1 HOUR LATER 30 tablet 2    COLCRYS 0.6 mg tablet TAKE 2 TABLETS BY MOUTH BY MOUTH AT  THE  FIRST  SIGN  OF  GOUT  THEN  1  TAB  1  HOUR  LATER 30 tablet 2    diazePAM (VALIUM) 10 MG Tab Take 10 mg by mouth once daily.   0     "diclofenac sodium (VOLTAREN) 1 % Gel APPLY  2 GRAMS  TOPICALLY TO AFFECTED AREA ONCE DAILY 300 g 0    FLUAD [id number], 65 YR UP,,PF, 45 mcg (15 mcg x 3)/0.5 mL Syrg ADM 0.5ML IM UTD  0    fluticasone (FLONASE) 50 mcg/actuation nasal spray       fluticasone-salmeterol diskus inhaler 250-50 mcg INHALE ONE PUFF INTO LUNGS TWICE DAILY 60 each 2    fluticasone-salmeterol diskus inhaler 250-50 mcg INHALE 1 PUFF INTO LUNGS TWICE DAILY 60 each 0    FLUZONE HIGH-DOSE 2017-18, PF, 180 mcg/0.5 mL vaccine ADM 0.5ML IM UTD  0    gabapentin (NEURONTIN) 600 MG tablet Take 1 tablet (600 mg total) by mouth 3 (three) times daily. 90 tablet 11    lancets Misc To check BG 2 times daily, to use with insurance preferred meter 200 each 3    levocetirizine (XYZAL) 5 MG tablet TAKE ONE TABLET BY MOUTH ONCE DAILY IN THE EVENING 30 tablet 11    lisinopril-hydrochlorothiazide (PRINZIDE,ZESTORETIC) 20-25 mg Tab Take 1 tablet by mouth once daily. 90 tablet 1    [DISCONTINUED] nystatin (MYCOSTATIN) cream APPLY   TOPICALLY TO AFFECTED AREA TWICE DAILY 60 g 0     No current facility-administered medications on file prior to visit.        Objective:      BP (!) 151/70   Pulse 70   Resp 18   Ht 5' 9" (1.753 m)   Wt (!) 149.1 kg (328 lb 12.8 oz)   SpO2 98%   BMI 48.56 kg/m²     Exam:  GEN:  Well developed, well nourished.  No acute distress.   HEENT:  No trauma.  Mucous membranes moist.  Nares patent bilaterally.  PSYCH: Normal affect. Thought content appropriate.  CHEST:  Breathing symmetric.  No audible wheezing.  ABD: Soft, non-distended.  SKIN:  Warm, pink, dry.  No rash on exposed areas.    EXT:  No cyanosis, clubbing, or edema.  No color change or changes in nail or hair growth.  NEURO/MUSCULOSKELETAL:  Fully alert, oriented, and appropriate. Speech normal pérez. No cranial nerve deficits.   Gait:  Normal.  No focal motor deficits.               Imaging:  Narrative     EXAMINATION:  MRI CERVICAL SPINE WITHOUT " CONTRAST    CLINICAL HISTORY:  Cervical radiculopathy; Carpal tunnel syndrome, bilateral upper limbs    TECHNIQUE:  Multiplanar, multisequence MR images of the cervical spine were performed without the administration of contrast.  Exam significantly limited secondary to patient motion, body habitus, and technical inability to place coil properly.    COMPARISON:  None.    FINDINGS:  Alignment: Mild straightening of normal cervical lordosis.  There is minimal suspected retrolisthesis of C3 on C4 and C4 on C5.    Vertebrae: Normal marrow signal. No fracture.    Discs: Intervertebral disc height loss and degenerative signal change.    Cord: Normal.    Skull base and craniocervical junction: Normal.    Degenerative findings:    C2-C3: Posterior disc osteophyte complex and moderate left facet arthropathy resulting in moderate left neural foraminal narrowing.  No spinal canal stenosis.    C3-C4: Posterior disc osteophyte complex and facet arthropathy resulting in mild spinal canal stenosis, moderate left neural foraminal narrowing, and mild right neural foraminal narrowing.    C4-C5: Posterior disc osteophyte complex and facet arthropathy resulting in moderate spinal canal stenosis, severe left neural foraminal narrowing, and mild right neural foraminal narrowing.    C5-C6: Posterior disc osteophyte complex and facet arthropathy resulting in moderate spinal canal stenosis and moderate bilateral neural foraminal narrowing.    C6-C7: Posterior disc osteophyte complex and facet arthropathy resulting in mild-to-moderate moderate spinal canal stenosis and severe bilateral neural foraminal narrowing.    C7-T1: Posterior disc osteophyte complex and facet arthropathy resulting in mild spinal canal stenosis, severe right neural foraminal narrowing, and moderate left neural foraminal narrowing.    Paraspinal muscles & soft tissues: Unremarkable.   Impression       Exam significantly limited secondary to patient motion, body habitus,  and technical inability to place coil.    Multilevel degenerative change of the cervical spine with multiple areas of mild to moderate spinal canal stenosis and moderate-severe neural foraminal narrowing as detailed above.    Electronically signed by resident: Isidro Mackenzie  Date: 01/17/2019  Time: 09:45    Electronically signed by: Raymond Plasencia MD  Date: 01/17/2019  Time: 11:39         Assessment:       Encounter Diagnoses   Name Primary?    DDD (degenerative disc disease), cervical Yes    Osteoarthritis of spine with radiculopathy, cervical region     Cervical radiculopathy          Plan:       Bj was seen today for follow-up.    Diagnoses and all orders for this visit:    DDD (degenerative disc disease), cervical    Osteoarthritis of spine with radiculopathy, cervical region    Cervical radiculopathy        Bj Pate Jr. is a 69 y.o. male with chronic bilateral neck and upper extremity pain/numbness.  Likely related to multilevel cervical neuroforaminal stenosis.    1.  Schedule for repeat C7-T1 interlaminar epidural steroid injection.  2.  Return to clinic 2 weeks after procedure to discuss results.  If limited relief from cervical epidural steroid injection may consider neurosurgery referral.  We can also move forward with lumbar radiofrequency ablation as planned when patient is ready.

## 2019-07-08 NOTE — H&P (VIEW-ONLY)
Subjective:     Patient ID: Bj Pate Jr. is a 69 y.o. male    Chief Complaint: Follow-up (Discuss upper back pain/discomfort)        Referred by: No ref. provider found      HPI:    Interval History (7/8/19):  He returns today for follow up.  He reports that his neck and upper extremity pain/numbness has returned.  He denies any changes in the quality location of this problem.  He denies any new or worsening symptoms.  He states that previous cervical epidural steroid injection provided roughly 50% relief but for no more than 2 weeks.  He states that he is not interested in surgery at this time.      Interval History (6/17/19):  He returns today for follow up.  He reports that bilateral L3, L4 and L5 medial branch blocks have been helpful for the low back pain. He states that when he was initially asked about the effects of the medial branch blocks he misunderstood the question.  He reports 80% relief of his low back pain on the day of the medial branch blocks.  He denies any changes in the quality location was pain.  He denies any new or worsening symptoms.      Interval History (5/14/19):  He returns today for follow up.  He reports that C7-T1 interlaminar epidural steroid injection has been helpful for the neck and upper extremity pain. He reports about 50% relief of his symptoms.  He denies any changes in the quality location the pain. It today he wishes to discuss his low back pain further.  He has had chronic low back pain for many years.  Pain is located across the lower lumbar region and does not radiate.  He denies any associated numbness, tingling, weakness, bowel or bladder dysfunction.  The pain is constant and worsened with activity.      Initial Encounter (4/11/19):  Bj Pate Jr. is a 69 y.o. male who presents today with chronic neck and low back pain. Will address his neck pain during today's visit.  He states that he has had neck pain for years.  This pain radiates in the bilateral upper  extremities and causes numbness in his hands.  This will sometimes wake him from sleep.  The pain is equal on both sides.  He denies any focal weakness.  He denies any bowel bladder dysfunction.  The pain is constant and worsened with activity.  EMG with evidence of chronic denervation in the right C5 through C7 dermatomes.  This pain is described in detail below.    Physical Therapy:  Yes.  Somewhat helpful    Non-pharmacologic Treatment:  Nothing helps         · TENS?  No    Pain Medications:         · Currently taking:  Gabapentin 600 mg t.i.d. p.r.n. (this causes diarrhea), Tylenol, Aleve    · Has tried in the past:      · Has not tried: Opioids, Muscle relaxants, TCAs, SNRIs, topical creams    Blood thinners:  Aspirin 81 mg a day    Interventional Therapies:    5/2/19 - C7-T1 interlaminar epidural steroid injection - 50% relief for 2 weeks  5/29/19 - bilateral L3, L4 and L5 medial branch blocks - 80% relief  7/3/19 - bilateral L3, L4 and L5 medial branch blocks - 80% relief    Relevant Surgeries:  None    Affecting sleep?  Yes    Affecting daily activities? yes    Depressive symptoms? no          · SI/HI? No    Work status: Retired    Pain Scores:    Best:       6/10  Worst:     8/10  Usually:   7/10  Today:    6/10    Review of Systems   Constitutional: Negative for activity change, appetite change, chills, fatigue, fever and unexpected weight change.   HENT: Negative for hearing loss.    Eyes: Negative for visual disturbance.   Respiratory: Negative for chest tightness and shortness of breath.    Cardiovascular: Negative for chest pain.   Gastrointestinal: Negative for abdominal pain, constipation, diarrhea, nausea and vomiting.   Genitourinary: Negative for difficulty urinating.   Musculoskeletal: Positive for back pain, gait problem, myalgias, neck pain and neck stiffness.   Skin: Negative for rash.   Neurological: Positive for numbness. Negative for dizziness, weakness, light-headedness and headaches.    Psychiatric/Behavioral: Positive for sleep disturbance. Negative for hallucinations and suicidal ideas. The patient is not nervous/anxious.        Past Medical History:   Diagnosis Date    Basal ganglia hemorrhage     Left basal ganglia hemorrhage with resultant right-sided hemiparesis which has resolved.    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        Past Surgical History:   Procedure Laterality Date    Injection, Steroid, Epidural Cervical N/A 5/2/2019    Performed by [facility]iel [facility]an [facility] at [facility]    Lumbar Medial Branch Blocks Bilateral 7/3/2019    Performed by [facility] at [facility]    Lumbar Medial Branch Blocks Bilateral 5/29/2019    Performed by [facility] at [facility]    NO PAST SURGERIES         Social History     Socioeconomic History    Marital status:      Spouse name: Not on file    Number of children: 3    Years of education: Not on file    Highest education level: Not on file   Occupational History    Occupation:    Social Needs    Financial resource strain: Not on file    Food insecurity:     Worry: Not on file     Inability: Not on file    Transportation needs:     Medical: Not on file     Non-medical: Not on file   Tobacco Use    Smoking status: Never Smoker    Smokeless tobacco: Never Used   Substance and Sexual Activity    Alcohol use: Yes     Alcohol/week: 0.0 oz     Comment: occacionally    Drug use: No    Sexual activity: Not on file   Lifestyle    Physical activity:     Days per week: Not on file      Minutes per session: Not on file    Stress: Not on file   Relationships    Social connections:     Talks on phone: Not on file     Gets together: Not on file     Attends Taoist service: Not on file     Active member of club or organization: Not on file     Attends meetings of clubs or organizations: Not on file     Relationship status: Not on file   Other Topics Concern    Not on file   Social History Narrative    Not on file       Review of patient's allergies indicates:   Allergen Reactions    Tomato (solanum lycopersicum) Hives    Naproxen Hives    Shrimp Other (See Comments)       Current Outpatient Medications on File Prior to Visit   Medication Sig Dispense Refill    acetaminophen-codeine 300-30mg (TYLENOL #3) 300-30 mg Tab as needed.       albuterol 90 mcg/actuation inhaler Inhale 2 puffs into the lungs every 6 (six) hours as needed for Wheezing. 1 Inhaler 11    aspirin 325 MG tablet Take 1 tablet (325 mg total) by mouth once daily. (Patient taking differently: Take 81 mg by mouth once daily. )  0    atorvastatin (LIPITOR) 40 MG tablet TAKE 1 TABLET BY MOUTH ONCE DAILY 90 tablet 0    blood pressure test kit-large Kit 1 Device by Misc.(Non-Drug; Combo Route) route 2 (two) times daily. 1 each 0    blood sugar diagnostic Strp To check BG 2 times daily, to use with insurance preferred meter 200 strip 3    blood-glucose meter kit To check BG 2 times daily, to use with insurance preferred meter 1 each 0    CARTIA  mg 24 hr capsule TAKE 1 CAPSULE BY MOUTH ONCE DAILY 90 capsule 0    clobetasol 0.05% (TEMOVATE) 0.05 % Oint Apply topically 2 (two) times daily. 45 g 5    COLCRYS 0.6 mg tablet TAKE 2 TABLETS BY MOUTH AT THE FIRST SIGN OF GOUT FLARE THEN 1 TABLET 1 HOUR LATER 30 tablet 2    COLCRYS 0.6 mg tablet TAKE 2 TABLETS BY MOUTH BY MOUTH AT  THE  FIRST  SIGN  OF  GOUT  THEN  1  TAB  1  HOUR  LATER 30 tablet 2    diazePAM (VALIUM) 10 MG Tab Take 10 mg by mouth once daily.   0     "diclofenac sodium (VOLTAREN) 1 % Gel APPLY  2 GRAMS  TOPICALLY TO AFFECTED AREA ONCE DAILY 300 g 0    FLUAD [id number], 65 YR UP,,PF, 45 mcg (15 mcg x 3)/0.5 mL Syrg ADM 0.5ML IM UTD  0    fluticasone (FLONASE) 50 mcg/actuation nasal spray       fluticasone-salmeterol diskus inhaler 250-50 mcg INHALE ONE PUFF INTO LUNGS TWICE DAILY 60 each 2    fluticasone-salmeterol diskus inhaler 250-50 mcg INHALE 1 PUFF INTO LUNGS TWICE DAILY 60 each 0    FLUZONE HIGH-DOSE 2017-18, PF, 180 mcg/0.5 mL vaccine ADM 0.5ML IM UTD  0    gabapentin (NEURONTIN) 600 MG tablet Take 1 tablet (600 mg total) by mouth 3 (three) times daily. 90 tablet 11    lancets Misc To check BG 2 times daily, to use with insurance preferred meter 200 each 3    levocetirizine (XYZAL) 5 MG tablet TAKE ONE TABLET BY MOUTH ONCE DAILY IN THE EVENING 30 tablet 11    lisinopril-hydrochlorothiazide (PRINZIDE,ZESTORETIC) 20-25 mg Tab Take 1 tablet by mouth once daily. 90 tablet 1    [DISCONTINUED] nystatin (MYCOSTATIN) cream APPLY   TOPICALLY TO AFFECTED AREA TWICE DAILY 60 g 0     No current facility-administered medications on file prior to visit.        Objective:      BP (!) 151/70   Pulse 70   Resp 18   Ht 5' 9" (1.753 m)   Wt (!) 149.1 kg (328 lb 12.8 oz)   SpO2 98%   BMI 48.56 kg/m²     Exam:  GEN:  Well developed, well nourished.  No acute distress.   HEENT:  No trauma.  Mucous membranes moist.  Nares patent bilaterally.  PSYCH: Normal affect. Thought content appropriate.  CHEST:  Breathing symmetric.  No audible wheezing.  ABD: Soft, non-distended.  SKIN:  Warm, pink, dry.  No rash on exposed areas.    EXT:  No cyanosis, clubbing, or edema.  No color change or changes in nail or hair growth.  NEURO/MUSCULOSKELETAL:  Fully alert, oriented, and appropriate. Speech normal pérez. No cranial nerve deficits.   Gait:  Normal.  No focal motor deficits.               Imaging:  Narrative     EXAMINATION:  MRI CERVICAL SPINE WITHOUT " CONTRAST    CLINICAL HISTORY:  Cervical radiculopathy; Carpal tunnel syndrome, bilateral upper limbs    TECHNIQUE:  Multiplanar, multisequence MR images of the cervical spine were performed without the administration of contrast.  Exam significantly limited secondary to patient motion, body habitus, and technical inability to place coil properly.    COMPARISON:  None.    FINDINGS:  Alignment: Mild straightening of normal cervical lordosis.  There is minimal suspected retrolisthesis of C3 on C4 and C4 on C5.    Vertebrae: Normal marrow signal. No fracture.    Discs: Intervertebral disc height loss and degenerative signal change.    Cord: Normal.    Skull base and craniocervical junction: Normal.    Degenerative findings:    C2-C3: Posterior disc osteophyte complex and moderate left facet arthropathy resulting in moderate left neural foraminal narrowing.  No spinal canal stenosis.    C3-C4: Posterior disc osteophyte complex and facet arthropathy resulting in mild spinal canal stenosis, moderate left neural foraminal narrowing, and mild right neural foraminal narrowing.    C4-C5: Posterior disc osteophyte complex and facet arthropathy resulting in moderate spinal canal stenosis, severe left neural foraminal narrowing, and mild right neural foraminal narrowing.    C5-C6: Posterior disc osteophyte complex and facet arthropathy resulting in moderate spinal canal stenosis and moderate bilateral neural foraminal narrowing.    C6-C7: Posterior disc osteophyte complex and facet arthropathy resulting in mild-to-moderate moderate spinal canal stenosis and severe bilateral neural foraminal narrowing.    C7-T1: Posterior disc osteophyte complex and facet arthropathy resulting in mild spinal canal stenosis, severe right neural foraminal narrowing, and moderate left neural foraminal narrowing.    Paraspinal muscles & soft tissues: Unremarkable.   Impression       Exam significantly limited secondary to patient motion, body habitus,  and technical inability to place coil.    Multilevel degenerative change of the cervical spine with multiple areas of mild to moderate spinal canal stenosis and moderate-severe neural foraminal narrowing as detailed above.    Electronically signed by resident: Isidro Mackenzie  Date: 01/17/2019  Time: 09:45    Electronically signed by: Raymond Plasencia MD  Date: 01/17/2019  Time: 11:39         Assessment:       Encounter Diagnoses   Name Primary?    DDD (degenerative disc disease), cervical Yes    Osteoarthritis of spine with radiculopathy, cervical region     Cervical radiculopathy          Plan:       Bj was seen today for follow-up.    Diagnoses and all orders for this visit:    DDD (degenerative disc disease), cervical    Osteoarthritis of spine with radiculopathy, cervical region    Cervical radiculopathy        Bj Pate Jr. is a 69 y.o. male with chronic bilateral neck and upper extremity pain/numbness.  Likely related to multilevel cervical neuroforaminal stenosis.    1.  Schedule for repeat C7-T1 interlaminar epidural steroid injection.  2.  Return to clinic 2 weeks after procedure to discuss results.  If limited relief from cervical epidural steroid injection may consider neurosurgery referral.  We can also move forward with lumbar radiofrequency ablation as planned when patient is ready.

## 2019-07-15 ENCOUNTER — TELEPHONE (OUTPATIENT)
Dept: PAIN MEDICINE | Facility: CLINIC | Age: 69
End: 2019-07-15

## 2019-07-15 DIAGNOSIS — M54.12 CERVICAL RADICULOPATHY: ICD-10-CM

## 2019-07-15 DIAGNOSIS — M50.30 DDD (DEGENERATIVE DISC DISEASE), CERVICAL: Primary | ICD-10-CM

## 2019-07-15 NOTE — TELEPHONE ENCOUNTER
Message left asking patient to contact clinic to discuss scheduling HARVEY- phone number included.

## 2019-07-15 NOTE — TELEPHONE ENCOUNTER
Patient would like to schedule C7-T1 rosalba on 8/7/19.  Informed him that the last dose of ASA will be on 7/30/19.  Patient verbalized understanding.

## 2019-07-26 DIAGNOSIS — E11.9 TYPE 2 DIABETES MELLITUS WITHOUT COMPLICATION: ICD-10-CM

## 2019-07-30 ENCOUNTER — TELEPHONE (OUTPATIENT)
Dept: PAIN MEDICINE | Facility: CLINIC | Age: 69
End: 2019-07-30

## 2019-07-30 NOTE — TELEPHONE ENCOUNTER
Informed  Noeansley that today would be the last dose of ASA until after the procedure scheduled on 8/7/19- verbalized understanding.

## 2019-07-31 DIAGNOSIS — J44.9 COPD, MODERATE: ICD-10-CM

## 2019-07-31 RX ORDER — FLUTICASONE PROPIONATE AND SALMETEROL 250; 50 UG/1; UG/1
POWDER RESPIRATORY (INHALATION)
Qty: 60 EACH | Refills: 0 | Status: SHIPPED | OUTPATIENT
Start: 2019-07-31 | End: 2019-10-14 | Stop reason: SDUPTHER

## 2019-08-02 ENCOUNTER — TELEPHONE (OUTPATIENT)
Dept: PAIN MEDICINE | Facility: CLINIC | Age: 69
End: 2019-08-02

## 2019-08-02 NOTE — TELEPHONE ENCOUNTER
Message left with review of pre-procedure instructions, as well as arrival time of 1115.  Asked that patient call the clinic to confirm- phone number included.

## 2019-08-02 NOTE — TELEPHONE ENCOUNTER
Patient called to confirm arrival time of 1115 for 8/7/19.  Verbalized understanding of instructions.

## 2019-08-05 DIAGNOSIS — E11.9 DIABETES MELLITUS WITHOUT COMPLICATION: Primary | ICD-10-CM

## 2019-08-05 DIAGNOSIS — M15.9 PRIMARY OSTEOARTHRITIS INVOLVING MULTIPLE JOINTS: ICD-10-CM

## 2019-08-05 NOTE — TELEPHONE ENCOUNTER
----- Message from Larisa poloancelmobandar sent at 8/5/2019  3:52 PM CDT -----  Type: RX Refill Request    Who Called: pt     Refill or New Rx: refill     RX Name and Strength: diclofenac sodium (VOLTAREN) 1 % Gel    Preferred Pharmacy with phone number: ..  [id number] - [place], [place] - [address] [place] [address]  Phone: [phone] Fax: [phone]    Local or Mail Order: local     Ordering Provider: Dr. Lombard    Would the patient rather a call back or a response [facility]? Call back     Best Call Back Number: [phone]    Additional Information:

## 2019-08-07 ENCOUNTER — HOSPITAL ENCOUNTER (OUTPATIENT)
Facility: HOSPITAL | Age: 69
Discharge: HOME | End: 2019-08-07
Attending: PAIN MEDICINE | Admitting: PAIN MEDICINE
Payer: MEDICARE

## 2019-08-07 ENCOUNTER — HOSPITAL ENCOUNTER (OUTPATIENT)
Dept: RADIOLOGY | Facility: HOSPITAL | Age: 69
Discharge: HOME | End: 2019-08-07
Attending: PAIN MEDICINE | Admitting: PAIN MEDICINE
Payer: MEDICARE

## 2019-08-07 VITALS
DIASTOLIC BLOOD PRESSURE: 72 MMHG | RESPIRATION RATE: 18 BRPM | SYSTOLIC BLOOD PRESSURE: 131 MMHG | WEIGHT: 315 LBS | TEMPERATURE: 98 F | OXYGEN SATURATION: 97 % | BODY MASS INDEX: 46.65 KG/M2 | HEART RATE: 77 BPM | HEIGHT: 69 IN

## 2019-08-07 DIAGNOSIS — M54.12 CERVICAL RADICULOPATHY: Primary | ICD-10-CM

## 2019-08-07 DIAGNOSIS — M50.30 DDD (DEGENERATIVE DISC DISEASE), CERVICAL: ICD-10-CM

## 2019-08-07 LAB — POCT GLUCOSE: 109 MG/DL (ref 70–110)

## 2019-08-07 PROCEDURE — 62320 NJX INTERLAMINAR CRV/THRC: CPT | Performed by: PAIN MEDICINE

## 2019-08-07 PROCEDURE — 25500020 PHARM REV CODE 255: Performed by: PAIN MEDICINE

## 2019-08-07 PROCEDURE — 62321 NJX INTERLAMINAR CRV/THRC: CPT | Performed by: PAIN MEDICINE

## 2019-08-07 PROCEDURE — 25000003 PHARM REV CODE 250

## 2019-08-07 PROCEDURE — 62321 PR INJ CERV/THORAC, W/GUIDANCE: ICD-10-PCS | Mod: ,,, | Performed by: PAIN MEDICINE

## 2019-08-07 PROCEDURE — 63600175 PHARM REV CODE 636 W HCPCS: Performed by: PAIN MEDICINE

## 2019-08-07 PROCEDURE — 25000003 PHARM REV CODE 250: Performed by: PAIN MEDICINE

## 2019-08-07 PROCEDURE — 62321 NJX INTERLAMINAR CRV/THRC: CPT | Mod: ,,, | Performed by: PAIN MEDICINE

## 2019-08-07 RX ORDER — LIDOCAINE HYDROCHLORIDE 20 MG/ML
10 INJECTION, SOLUTION INFILTRATION; PERINEURAL ONCE
Status: COMPLETED | OUTPATIENT
Start: 2019-08-07 | End: 2019-08-07

## 2019-08-07 RX ORDER — DEXAMETHASONE SODIUM PHOSPHATE 4 MG/ML
INJECTION, SOLUTION INTRA-ARTICULAR; INTRALESIONAL; INTRAMUSCULAR; INTRAVENOUS; SOFT TISSUE
Status: DISCONTINUED
Start: 2019-08-07 | End: 2019-08-07 | Stop reason: HOSPADM

## 2019-08-07 RX ORDER — ALPRAZOLAM 0.5 MG/1
1 TABLET, ORALLY DISINTEGRATING ORAL ONCE AS NEEDED
Status: COMPLETED | OUTPATIENT
Start: 2019-08-07 | End: 2019-08-07

## 2019-08-07 RX ORDER — LIDOCAINE HYDROCHLORIDE 10 MG/ML
1 INJECTION, SOLUTION EPIDURAL; INFILTRATION; INTRACAUDAL; PERINEURAL ONCE
Status: COMPLETED | OUTPATIENT
Start: 2019-08-07 | End: 2019-08-07

## 2019-08-07 RX ORDER — LIDOCAINE HYDROCHLORIDE 20 MG/ML
INJECTION, SOLUTION INFILTRATION; PERINEURAL
Status: DISCONTINUED
Start: 2019-08-07 | End: 2019-08-07 | Stop reason: HOSPADM

## 2019-08-07 RX ORDER — DEXAMETHASONE SODIUM PHOSPHATE 10 MG/ML
INJECTION INTRAMUSCULAR; INTRAVENOUS
Status: DISCONTINUED
Start: 2019-08-07 | End: 2019-08-07 | Stop reason: HOSPADM

## 2019-08-07 RX ORDER — DEXAMETHASONE SODIUM PHOSPHATE 10 MG/ML
10 INJECTION INTRAMUSCULAR; INTRAVENOUS ONCE
Status: COMPLETED | OUTPATIENT
Start: 2019-08-07 | End: 2019-08-07

## 2019-08-07 RX ORDER — ALPRAZOLAM 0.5 MG/1
TABLET, ORALLY DISINTEGRATING ORAL
Status: COMPLETED
Start: 2019-08-07 | End: 2019-08-07

## 2019-08-07 RX ORDER — LIDOCAINE HYDROCHLORIDE 10 MG/ML
INJECTION, SOLUTION EPIDURAL; INFILTRATION; INTRACAUDAL; PERINEURAL
Status: DISCONTINUED
Start: 2019-08-07 | End: 2019-08-07 | Stop reason: HOSPADM

## 2019-08-07 RX ADMIN — ALPRAZOLAM 1 MG: 0.5 TABLET, ORALLY DISINTEGRATING ORAL at 11:08

## 2019-08-07 NOTE — OP NOTE
Cervical Interlaminar Epidural Steroid Injection under fluoroscopic guidance.  Time-out taken to identify patient and procedure side prior to starting the procedure.   I attest that I have reviewed the patient's home medications prior to the procedure and no contraindication have been identified. I re-evaluated the patient after the patient was positioned for the procedure in the procedure room immediately before the procedural time-out. The vital signs are current and represent the current state of the patient which has not significantly changed since the preprocedure assessment.                                                              Date of Service: 08/07/2019    PCP: Azikiwe K Lombard, MD    Referring Physician:    Procedure: C7-T1 cervical interlaminar epidural steroid injection under fluoroscopy.    Reasons for procedure: DDD (degenerative disc disease), cervical [M50.30]  Cervical radiculopathy [M54.12]    Physician: Dariel Doan MD  ASSISTANTS: None    Medications injected:  Preservative-free dexamethasone 10mg and Xylocaine 1% MPF 1ml.  This was followed by a slow injection of 4 mL sterile, preservative-free normal saline.    Local anesthetic used: Xylocaine 2% 2ml.     Sedation Medications: None    Complications:  none.    Estimated blood loss: none.    Technique:  With the patient laying in a prone position with the neck in a flexed forward position, the area was prepped and draped in the usual sterile fashion using ChloraPrep and a fenestrated drape.  The area was determined under fluoroscopic guidance.  Local anesthetic was given using a 27-gauge needle by raising a wheal and going down to the osseous elements of the cervical spine.  A 3.5 inch 20-gauge Touhy needle was introduced under fluoroscopic guidance to meet the lamina of T1.  The needle was then hinged under the lamina then advanced using loss of resistance technique.  Once the tip of the needle was in the desired position, the  contrast dye Omnipaque was injected to determine placement and no vascular runoff.  Digital subtraction was employed to confirm that there is no vascular runoff.  The steroid was then injected slowly followed by a slow injection of the 4 mL of the sterile preservative-free normal saline.  The patient tolerated the procedure well.    Pain before the procedure: 6/10    Pain after the procedure: 6/10    The patient was monitored after the procedure and was given post-procedure and discharge instructions to follow at home. The patient was discharged in a stable condition

## 2019-08-07 NOTE — DISCHARGE INSTRUCTIONS

## 2019-08-08 ENCOUNTER — TELEPHONE (OUTPATIENT)
Dept: PAIN MEDICINE | Facility: CLINIC | Age: 69
End: 2019-08-08

## 2019-08-08 NOTE — TELEPHONE ENCOUNTER
Message left asking patient to contact clinic to schedule a f/u after procedure performed yesterday.

## 2019-08-13 RX ORDER — DICLOFENAC SODIUM 10 MG/G
GEL TOPICAL
Qty: 300 G | Refills: 0 | Status: SHIPPED | OUTPATIENT
Start: 2019-08-13 | End: 2020-05-07 | Stop reason: SDUPTHER

## 2019-08-19 ENCOUNTER — LAB VISIT (OUTPATIENT)
Dept: LAB | Facility: HOSPITAL | Age: 69
End: 2019-08-19
Attending: FAMILY MEDICINE
Payer: MEDICARE

## 2019-08-19 ENCOUNTER — OFFICE VISIT (OUTPATIENT)
Dept: FAMILY MEDICINE | Facility: CLINIC | Age: 69
End: 2019-08-19
Payer: MEDICARE

## 2019-08-19 VITALS
OXYGEN SATURATION: 95 % | SYSTOLIC BLOOD PRESSURE: 130 MMHG | BODY MASS INDEX: 46.65 KG/M2 | HEIGHT: 69 IN | WEIGHT: 315 LBS | HEART RATE: 98 BPM | TEMPERATURE: 98 F | DIASTOLIC BLOOD PRESSURE: 72 MMHG | RESPIRATION RATE: 18 BRPM

## 2019-08-19 DIAGNOSIS — E66.01 MORBID OBESITY WITH BMI OF 45.0-49.9, ADULT: ICD-10-CM

## 2019-08-19 DIAGNOSIS — J44.9 COPD, MODERATE: ICD-10-CM

## 2019-08-19 DIAGNOSIS — M47.816 LUMBAR SPONDYLOSIS: ICD-10-CM

## 2019-08-19 DIAGNOSIS — E11.9 TYPE 2 DIABETES MELLITUS WITHOUT COMPLICATION: ICD-10-CM

## 2019-08-19 DIAGNOSIS — E11.9 DIET-CONTROLLED DIABETES MELLITUS: ICD-10-CM

## 2019-08-19 DIAGNOSIS — Z12.5 SPECIAL SCREENING FOR MALIGNANT NEOPLASM OF PROSTATE: ICD-10-CM

## 2019-08-19 DIAGNOSIS — I10 ESSENTIAL HYPERTENSION: ICD-10-CM

## 2019-08-19 DIAGNOSIS — I10 ESSENTIAL HYPERTENSION: Primary | ICD-10-CM

## 2019-08-19 DIAGNOSIS — Z12.11 COLON CANCER SCREENING: ICD-10-CM

## 2019-08-19 DIAGNOSIS — E78.5 DYSLIPIDEMIA: ICD-10-CM

## 2019-08-19 LAB
ALBUMIN SERPL BCP-MCNC: 3.6 G/DL (ref 3.5–5.2)
ALP SERPL-CCNC: 67 U/L (ref 55–135)
ALT SERPL W/O P-5'-P-CCNC: 21 U/L (ref 10–44)
ANION GAP SERPL CALC-SCNC: 11 MMOL/L (ref 8–16)
AST SERPL-CCNC: 28 U/L (ref 10–40)
BILIRUB SERPL-MCNC: 0.5 MG/DL (ref 0.1–1)
BUN SERPL-MCNC: 19 MG/DL (ref 8–23)
CALCIUM SERPL-MCNC: 9.7 MG/DL (ref 8.7–10.5)
CHLORIDE SERPL-SCNC: 104 MMOL/L (ref 95–110)
CHOLEST SERPL-MCNC: 124 MG/DL (ref 120–199)
CHOLEST/HDLC SERPL: 2.3 {RATIO} (ref 2–5)
CO2 SERPL-SCNC: 26 MMOL/L (ref 23–29)
CREAT SERPL-MCNC: 1.5 MG/DL (ref 0.5–1.4)
EST. GFR  (AFRICAN AMERICAN): 54.9 ML/MIN/1.73 M^2
EST. GFR  (NON AFRICAN AMERICAN): 47.5 ML/MIN/1.73 M^2
ESTIMATED AVG GLUCOSE: 114 MG/DL (ref 68–131)
GLUCOSE SERPL-MCNC: 119 MG/DL (ref 70–110)
HBA1C MFR BLD HPLC: 5.6 % (ref 4–5.6)
HDLC SERPL-MCNC: 55 MG/DL (ref 40–75)
HDLC SERPL: 44.4 % (ref 20–50)
LDLC SERPL CALC-MCNC: 42.2 MG/DL (ref 63–159)
NONHDLC SERPL-MCNC: 69 MG/DL
POTASSIUM SERPL-SCNC: 4.5 MMOL/L (ref 3.5–5.1)
PROT SERPL-MCNC: 7.1 G/DL (ref 6–8.4)
SODIUM SERPL-SCNC: 141 MMOL/L (ref 136–145)
TRIGL SERPL-MCNC: 134 MG/DL (ref 30–150)

## 2019-08-19 PROCEDURE — 99214 PR OFFICE/OUTPT VISIT, EST, LEVL IV, 30-39 MIN: ICD-10-PCS | Mod: S$GLB,,, | Performed by: FAMILY MEDICINE

## 2019-08-19 PROCEDURE — 3044F PR MOST RECENT HEMOGLOBIN A1C LEVEL <7.0%: ICD-10-PCS | Mod: CPTII,S$GLB,, | Performed by: FAMILY MEDICINE

## 2019-08-19 PROCEDURE — 84153 ASSAY OF PSA TOTAL: CPT

## 2019-08-19 PROCEDURE — 36415 COLL VENOUS BLD VENIPUNCTURE: CPT | Mod: PO

## 2019-08-19 PROCEDURE — 1101F PT FALLS ASSESS-DOCD LE1/YR: CPT | Mod: CPTII,S$GLB,, | Performed by: FAMILY MEDICINE

## 2019-08-19 PROCEDURE — 99999 PR PBB SHADOW E&M-EST. PATIENT-LVL III: ICD-10-PCS | Mod: PBBFAC,,, | Performed by: FAMILY MEDICINE

## 2019-08-19 PROCEDURE — 3078F PR MOST RECENT DIASTOLIC BLOOD PRESSURE < 80 MM HG: ICD-10-PCS | Mod: CPTII,S$GLB,, | Performed by: FAMILY MEDICINE

## 2019-08-19 PROCEDURE — 99499 UNLISTED E&M SERVICE: CPT | Mod: S$GLB,,, | Performed by: FAMILY MEDICINE

## 2019-08-19 PROCEDURE — 99999 PR PBB SHADOW E&M-EST. PATIENT-LVL III: CPT | Mod: PBBFAC,,, | Performed by: FAMILY MEDICINE

## 2019-08-19 PROCEDURE — 1101F PR PT FALLS ASSESS DOC 0-1 FALLS W/OUT INJ PAST YR: ICD-10-PCS | Mod: CPTII,S$GLB,, | Performed by: FAMILY MEDICINE

## 2019-08-19 PROCEDURE — 99499 RISK ADDL DX/OHS AUDIT: ICD-10-PCS | Mod: S$GLB,,, | Performed by: FAMILY MEDICINE

## 2019-08-19 PROCEDURE — 80061 LIPID PANEL: CPT

## 2019-08-19 PROCEDURE — 83036 HEMOGLOBIN GLYCOSYLATED A1C: CPT

## 2019-08-19 PROCEDURE — 99214 OFFICE O/P EST MOD 30 MIN: CPT | Mod: S$GLB,,, | Performed by: FAMILY MEDICINE

## 2019-08-19 PROCEDURE — 3075F SYST BP GE 130 - 139MM HG: CPT | Mod: CPTII,S$GLB,, | Performed by: FAMILY MEDICINE

## 2019-08-19 PROCEDURE — 3044F HG A1C LEVEL LT 7.0%: CPT | Mod: CPTII,S$GLB,, | Performed by: FAMILY MEDICINE

## 2019-08-19 PROCEDURE — 3078F DIAST BP <80 MM HG: CPT | Mod: CPTII,S$GLB,, | Performed by: FAMILY MEDICINE

## 2019-08-19 PROCEDURE — 80053 COMPREHEN METABOLIC PANEL: CPT

## 2019-08-19 PROCEDURE — 3075F PR MOST RECENT SYSTOLIC BLOOD PRESS GE 130-139MM HG: ICD-10-PCS | Mod: CPTII,S$GLB,, | Performed by: FAMILY MEDICINE

## 2019-08-19 RX ORDER — DILTIAZEM HYDROCHLORIDE 240 MG/1
240 CAPSULE, COATED, EXTENDED RELEASE ORAL DAILY
Qty: 90 CAPSULE | Refills: 1 | Status: SHIPPED | OUTPATIENT
Start: 2019-08-19 | End: 2019-11-15 | Stop reason: SDUPTHER

## 2019-08-19 RX ORDER — LISINOPRIL AND HYDROCHLOROTHIAZIDE 20; 25 MG/1; MG/1
1 TABLET ORAL DAILY
Qty: 90 TABLET | Refills: 1 | Status: ON HOLD | OUTPATIENT
Start: 2019-08-19 | End: 2020-03-02 | Stop reason: HOSPADM

## 2019-08-19 RX ORDER — ALBUTEROL SULFATE 90 UG/1
2 AEROSOL, METERED RESPIRATORY (INHALATION) EVERY 6 HOURS PRN
Qty: 1 INHALER | Refills: 5 | Status: SHIPPED | OUTPATIENT
Start: 2019-08-19 | End: 2019-11-15

## 2019-08-19 RX ORDER — ATORVASTATIN CALCIUM 40 MG/1
40 TABLET, FILM COATED ORAL DAILY
Qty: 90 TABLET | Refills: 1 | Status: ON HOLD | OUTPATIENT
Start: 2019-08-19 | End: 2020-03-02 | Stop reason: SDUPTHER

## 2019-08-19 NOTE — PROGRESS NOTES
"Chief Complaint   Patient presents with    Hypertension       Bj Pate Jr. is a 69 y.o. male who presents per the Chief Complaint.  Pt is known to me and was last seen by me on 1/23/2019.  All known chronic medical issues have been documented.       HPI     ROS  Review of Systems   Constitutional: Negative.  Negative for activity change, appetite change, chills, diaphoresis, fatigue, fever and unexpected weight change.   HENT: Negative.  Negative for congestion, ear pain, hearing loss, nosebleeds, postnasal drip, rhinorrhea, sinus pressure, sneezing, sore throat and trouble swallowing.    Eyes: Negative for pain and visual disturbance.   Respiratory: Negative for cough, choking and shortness of breath.    Cardiovascular: Negative for chest pain, palpitations and leg swelling.   Gastrointestinal: Negative for abdominal pain, constipation, diarrhea, nausea and vomiting.   Endocrine: Negative for polydipsia, polyphagia and polyuria.   Genitourinary: Negative for difficulty urinating, dysuria, frequency and urgency.   Musculoskeletal: Positive for arthralgias and back pain. Negative for gait problem, joint swelling, myalgias and neck pain.   Skin: Negative.  Negative for pallor.   Allergic/Immunologic: Negative for environmental allergies and food allergies.   Neurological: Positive for numbness (tingling legs and arms). Negative for dizziness (resolving), tremors, seizures, syncope, speech difficulty, weakness, light-headedness and headaches.   Psychiatric/Behavioral: Negative.  Negative for confusion, decreased concentration, dysphoric mood and sleep disturbance. The patient is not nervous/anxious.        Physical Exam  Vitals:    08/19/19 0852   BP: 130/72   Pulse: 98   Resp: 18   Temp: 98.3 °F (36.8 °C)    Body mass index is 49.49 kg/m².  Weight: (!) 152 kg (335 lb 1.6 oz)   Height: 5' 9" (175.3 cm)     Physical Exam   Constitutional: He is oriented to person, place, and time. He appears well-developed and " well-nourished. He is active and cooperative.  Non-toxic appearance. He does not have a sickly appearance. He does not appear ill. No distress.   HENT:   Head: Normocephalic and atraumatic.   Right Ear: Hearing and external ear normal. No decreased hearing is noted.   Left Ear: Hearing and external ear normal. No decreased hearing is noted.   Nose: Nose normal. No rhinorrhea or nasal deformity.   Mouth/Throat: Uvula is midline and oropharynx is clear and moist. He does not have dentures. Normal dentition.   Eyes: Pupils are equal, round, and reactive to light. Conjunctivae, EOM and lids are normal. Right eye exhibits no chemosis, no discharge and no exudate. No foreign body present in the right eye. Left eye exhibits no chemosis, no discharge and no exudate. No foreign body present in the left eye. No scleral icterus.   Neck: Normal range of motion and full passive range of motion without pain. Neck supple.   Cardiovascular: Normal rate, regular rhythm, S1 normal, S2 normal and normal heart sounds. Exam reveals no gallop and no friction rub.   No murmur heard.  Pulmonary/Chest: Effort normal and breath sounds normal. No accessory muscle usage. No respiratory distress. He has no decreased breath sounds. He has no wheezes. He has no rhonchi. He has no rales.   Abdominal: Soft. Normal appearance. He exhibits no distension. There is no hepatosplenomegaly. There is no tenderness. There is no rigidity, no rebound and no guarding.   Musculoskeletal: Normal range of motion.        Cervical back: He exhibits tenderness and pain. He exhibits normal range of motion, no bony tenderness, no swelling, no edema, no deformity, no laceration, no spasm and normal pulse.        Right hand: He exhibits normal range of motion, no tenderness, no bony tenderness, normal two-point discrimination, normal capillary refill, no deformity, no laceration and no swelling. Decreased sensation noted. Normal strength noted.        Left hand: He  exhibits normal range of motion, no tenderness, no bony tenderness, normal two-point discrimination, normal capillary refill, no deformity, no laceration and no swelling. Decreased sensation noted. Normal strength noted.        Hands:  Fingertips with decreased sensation; capillary refill appears normal.   Neurological: He is alert and oriented to person, place, and time. He has normal strength. No cranial nerve deficit or sensory deficit. He exhibits normal muscle tone. He displays no seizure activity. Coordination and gait normal.   Skin: Skin is warm, dry and intact. No rash noted. He is not diaphoretic.   Psychiatric: He has a normal mood and affect. His speech is normal and behavior is normal. Judgment and thought content normal. Cognition and memory are normal. He is attentive.       Assessment & Plan    Discussion of plan of care including treatment options regarding health and wellness were reviewed and discussed with patient.  Any changes to medication or treatment plan, as well as any screening blood test, imaging, or referrals to specialist, are documented.  Follow up as indicated.     1. Essential hypertension  Patient was counseled and encouraged to maintain a low sodium diet, as well as increasing physical activity.  Recommend random BP checks at home on a regular basis.  Repeat BP at end of visit was not necessary. Will continue medication at this time, and follow up in 3-6 months, or sooner if blood pressure begins to increase.     - lisinopril-hydrochlorothiazide (PRINZIDE,ZESTORETIC) 20-25 mg Tab; Take 1 tablet by mouth once daily.  Dispense: 90 tablet; Refill: 1  - diltiaZEM (CARTIA XT) 240 MG 24 hr capsule; Take 1 capsule (240 mg total) by mouth once daily.  Dispense: 90 capsule; Refill: 1  - Comprehensive metabolic panel; Future    2. Lumbar spondylosis  Managed by Pain Specialist.    3. COPD, moderate  The current medical regimen is effective at this time and no changes to present plan or  medications will be made at this visit.  Medication prescribed for use only as symptoms require.   - albuterol (PROVENTIL/VENTOLIN HFA) 90 mcg/actuation inhaler; Inhale 2 puffs into the lungs every 6 (six) hours as needed for Wheezing.  Dispense: 1 Inhaler; Refill: 5    4. Diet-controlled diabetes mellitus  Screening test will be ordered and once results available patient will be notified of results and managed accordingly.    - Comprehensive metabolic panel; Future    5. Dyslipidemia  The current medical regimen is effective at this time and no changes to present plan or medications will be made at this visit.     - atorvastatin (LIPITOR) 40 MG tablet; Take 1 tablet (40 mg total) by mouth once daily.  Dispense: 90 tablet; Refill: 1    6. Morbid obesity with BMI of 45.0-49.9, adult  We discussed weight and safe, effective ways of losing pounds, including low carbohydrate, low fat diet and increasing physical activity to improve cardiovascular performance and increase metabolism.  Patient was encouraged to set realistic attainable goals for weight loss, and we will follow up periodically.     7. Special screening for malignant neoplasm of prostate  Screening test will be ordered and once results available patient will be notified of results and managed accordingly.    - PSA, Screening; Future    8. Colon cancer screening  FitKit was given to patient on 8/19/2019 9:21 AM   - Fecal Immunochemical Test (iFOBT); Future      Follow up in about 6 months (around 2/19/2020), or if symptoms worsen or fail to improve.        ACTIVE MEDICAL ISSUES:  Documented in Problem List    PAST MEDICAL HISTORY  Documented    PAST SURGICAL HISTORY:  Documented    SOCIAL HISTORY:  Documented    FAMILY HISTORY:  Documented    ALLERGIES AND MEDICATIONS: updated and reviewed.  Documented    Health Maintenance       Date Due Completion Date    Shingles Vaccine (1 of 2) 01/21/2002 ---    Foot Exam 01/30/2019 1/30/2018    Override on 7/20/2015:  Done (today in OV)    Hemoglobin A1c 04/25/2019 1/25/2019    Override on 4/8/2016: Done (future)    Override on 1/4/2016: Done (future)    Override on 8/22/2014: Done    Lipid Panel 07/25/2019 7/25/2018    Override on 8/22/2014: Done    Influenza Vaccine (1) 08/01/2019 10/10/2018    Fecal Occult Blood Test (FOBT)/FitKit 08/27/2019 8/27/2018    Eye Exam 12/13/2019 12/13/2018    Override on 12/13/2018: Done (Dr Jessy Matthew)    Override on 3/14/2017: Done    Override on 4/8/2016: Done (future)    Override on 8/22/2014: Done    High Dose Statin 08/07/2020 8/7/2019    TETANUS VACCINE 02/19/2026 2/19/2016

## 2019-08-20 ENCOUNTER — LAB VISIT (OUTPATIENT)
Dept: LAB | Facility: HOSPITAL | Age: 69
End: 2019-08-20
Attending: FAMILY MEDICINE
Payer: MEDICARE

## 2019-08-20 DIAGNOSIS — Z12.11 COLON CANCER SCREENING: ICD-10-CM

## 2019-08-20 LAB — COMPLEXED PSA SERPL-MCNC: 0.63 NG/ML (ref 0–4)

## 2019-08-20 PROCEDURE — 82274 ASSAY TEST FOR BLOOD FECAL: CPT

## 2019-08-27 LAB — HEMOCCULT STL QL IA: NEGATIVE

## 2019-09-03 ENCOUNTER — TELEPHONE (OUTPATIENT)
Dept: FAMILY MEDICINE | Facility: CLINIC | Age: 69
End: 2019-09-03

## 2019-09-03 ENCOUNTER — OFFICE VISIT (OUTPATIENT)
Dept: PAIN MEDICINE | Facility: CLINIC | Age: 69
End: 2019-09-03
Payer: MEDICARE

## 2019-09-03 VITALS
HEART RATE: 90 BPM | HEIGHT: 69 IN | SYSTOLIC BLOOD PRESSURE: 138 MMHG | BODY MASS INDEX: 46.65 KG/M2 | OXYGEN SATURATION: 98 % | DIASTOLIC BLOOD PRESSURE: 62 MMHG | WEIGHT: 315 LBS

## 2019-09-03 DIAGNOSIS — M50.30 DDD (DEGENERATIVE DISC DISEASE), CERVICAL: ICD-10-CM

## 2019-09-03 DIAGNOSIS — M54.12 CERVICAL RADICULOPATHY: Primary | ICD-10-CM

## 2019-09-03 DIAGNOSIS — M47.22 OSTEOARTHRITIS OF SPINE WITH RADICULOPATHY, CERVICAL REGION: ICD-10-CM

## 2019-09-03 PROCEDURE — 99213 OFFICE O/P EST LOW 20 MIN: CPT | Mod: S$GLB,,, | Performed by: PAIN MEDICINE

## 2019-09-03 PROCEDURE — 99999 PR PBB SHADOW E&M-EST. PATIENT-LVL III: CPT | Mod: PBBFAC,,, | Performed by: PAIN MEDICINE

## 2019-09-03 PROCEDURE — 3078F DIAST BP <80 MM HG: CPT | Mod: CPTII,S$GLB,, | Performed by: PAIN MEDICINE

## 2019-09-03 PROCEDURE — 3075F SYST BP GE 130 - 139MM HG: CPT | Mod: CPTII,S$GLB,, | Performed by: PAIN MEDICINE

## 2019-09-03 PROCEDURE — 1101F PT FALLS ASSESS-DOCD LE1/YR: CPT | Mod: CPTII,S$GLB,, | Performed by: PAIN MEDICINE

## 2019-09-03 PROCEDURE — 99213 PR OFFICE/OUTPT VISIT, EST, LEVL III, 20-29 MIN: ICD-10-PCS | Mod: S$GLB,,, | Performed by: PAIN MEDICINE

## 2019-09-03 PROCEDURE — 3075F PR MOST RECENT SYSTOLIC BLOOD PRESS GE 130-139MM HG: ICD-10-PCS | Mod: CPTII,S$GLB,, | Performed by: PAIN MEDICINE

## 2019-09-03 PROCEDURE — 99999 PR PBB SHADOW E&M-EST. PATIENT-LVL III: ICD-10-PCS | Mod: PBBFAC,,, | Performed by: PAIN MEDICINE

## 2019-09-03 PROCEDURE — 1101F PR PT FALLS ASSESS DOC 0-1 FALLS W/OUT INJ PAST YR: ICD-10-PCS | Mod: CPTII,S$GLB,, | Performed by: PAIN MEDICINE

## 2019-09-03 PROCEDURE — 3078F PR MOST RECENT DIASTOLIC BLOOD PRESSURE < 80 MM HG: ICD-10-PCS | Mod: CPTII,S$GLB,, | Performed by: PAIN MEDICINE

## 2019-09-03 NOTE — PROGRESS NOTES
Subjective:     Patient ID: Bj Pate Jr. is a 69 y.o. male    Chief Complaint: Follow-up        Referred by: No ref. provider found      HPI:    Interval History (9/3/19):  He returns today for follow up.  He reports that C7-T1 interlaminar epidural steroid injection has not been helpful for the neck and upper extremity pain/numbness.  He reports roughly 40% relief for few days.  After this time his pain fully returned.  He denies any changes in the quality location was pain.  He denies any new or worsening symptoms.      Interval History (7/8/19):  He returns today for follow up.  He reports that his neck and upper extremity pain/numbness has returned.  He denies any changes in the quality location of this problem.  He denies any new or worsening symptoms.  He states that previous cervical epidural steroid injection provided roughly 50% relief but for no more than 2 weeks.  He states that he is not interested in surgery at this time.      Interval History (6/17/19):  He returns today for follow up.  He reports that bilateral L3, L4 and L5 medial branch blocks have been helpful for the low back pain. He states that when he was initially asked about the effects of the medial branch blocks he misunderstood the question.  He reports 80% relief of his low back pain on the day of the medial branch blocks.  He denies any changes in the quality location was pain.  He denies any new or worsening symptoms.      Interval History (5/14/19):  He returns today for follow up.  He reports that C7-T1 interlaminar epidural steroid injection has been helpful for the neck and upper extremity pain. He reports about 50% relief of his symptoms.  He denies any changes in the quality location the pain. It today he wishes to discuss his low back pain further.  He has had chronic low back pain for many years.  Pain is located across the lower lumbar region and does not radiate.  He denies any associated numbness, tingling, weakness,  bowel or bladder dysfunction.  The pain is constant and worsened with activity.      Initial Encounter (4/11/19):  Bj Pate Jr. is a 69 y.o. male who presents today with chronic neck and low back pain. Will address his neck pain during today's visit.  He states that he has had neck pain for years.  This pain radiates in the bilateral upper extremities and causes numbness in his hands.  This will sometimes wake him from sleep.  The pain is equal on both sides.  He denies any focal weakness.  He denies any bowel bladder dysfunction.  The pain is constant and worsened with activity.  EMG with evidence of chronic denervation in the right C5 through C7 dermatomes.  This pain is described in detail below.    Physical Therapy:  Yes.  Somewhat helpful    Non-pharmacologic Treatment:  Nothing helps         · TENS?  No    Pain Medications:         · Currently taking:  Gabapentin 600 mg t.i.d. p.r.n. (this causes diarrhea), Tylenol, Aleve    · Has tried in the past:      · Has not tried: Opioids, Muscle relaxants, TCAs, SNRIs, topical creams    Blood thinners:  Aspirin 81 mg a day    Interventional Therapies:    5/2/19 - C7-T1 interlaminar epidural steroid injection - 50% relief for 2 weeks  5/29/19 - bilateral L3, L4 and L5 medial branch blocks - 80% relief  7/3/19 - bilateral L3, L4 and L5 medial branch blocks - 80% relief  8/7/19 - C7-T1 interlaminar epidural steroid injection    Relevant Surgeries:  None    Affecting sleep?  Yes    Affecting daily activities? yes    Depressive symptoms? no          · SI/HI? No    Work status: Retired    Pain Scores:    Best:       6/10  Worst:     8/10  Usually:   7/10  Today:    6/10    Review of Systems   Constitutional: Negative for activity change, appetite change, chills, fatigue, fever and unexpected weight change.   HENT: Negative for hearing loss.    Eyes: Negative for visual disturbance.   Respiratory: Negative for chest tightness and shortness of breath.    Cardiovascular:  Negative for chest pain.   Gastrointestinal: Negative for abdominal pain, constipation, diarrhea, nausea and vomiting.   Genitourinary: Negative for difficulty urinating.   Musculoskeletal: Positive for back pain, gait problem, myalgias, neck pain and neck stiffness.   Skin: Negative for rash.   Neurological: Positive for numbness. Negative for dizziness, weakness, light-headedness and headaches.   Psychiatric/Behavioral: Positive for sleep disturbance. Negative for hallucinations and suicidal ideas. The patient is not nervous/anxious.        Past Medical History:   Diagnosis Date    Basal ganglia hemorrhage     Left basal ganglia hemorrhage with resultant right-sided hemiparesis which has resolved.    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        Past Surgical History:   Procedure Laterality Date    Injection, Steroid, Epidural Cervical N/A 8/7/2019    Performed by [facility] at [facility]    Injection, Steroid, Epidural Cervical N/A 5/2/2019    Per[facility] by [facility] at [facility]    Lumbar Medial Branch Blocks Bilateral 7/3/2019    Per[facility]    Lumbar Medial Branch Blocks Bilateral 5/29/2019    Per[facility]    NO PAST SURGERIES         Social History     Socioeconomic History    Marital status:      Spouse name: Not on file    Number of children: 3    Years of education: Not on file    Highest education level: Not on file   Occupational History    Occupation:     Social Needs    Financial resource strain: Not on file    Food insecurity:     Worry: Not on file     Inability: Not on file    Transportation needs:     Medical: Not on file     Non-medical: Not on file   Tobacco Use    Smoking status: Never Smoker    Smokeless tobacco: Never Used   Substance and Sexual Activity    Alcohol use: Yes     Alcohol/week: 0.0 oz     Comment: occacionally    Drug use: No    Sexual activity: Not on file   Lifestyle    Physical activity:     Days per week: Not on file     Minutes per session: Not on file    Stress: Not on file   Relationships    Social connections:     Talks on phone: Not on file     Gets together: Not on file     Attends Yazidism service: Not on file     Active member of club or organization: Not on file     Attends meetings of clubs or organizations: Not on file     Relationship status: Not on file   Other Topics Concern    Not on file   Social History Narrative    Not on file       Review of patient's allergies indicates:   Allergen Reactions    Tomato (solanum lycopersicum) Hives    Naproxen Hives    Shrimp Other (See Comments)       Current Outpatient Medications on File Prior to Visit   Medication Sig Dispense Refill    acetaminophen-codeine 300-30mg (TYLENOL #3) 300-30 mg Tab as needed.       albuterol (PROVENTIL/VENTOLIN HFA) 90 mcg/actuation inhaler Inhale 2 puffs into the lungs every 6 (six) hours as needed for Wheezing. 1 Inhaler 5    atorvastatin (LIPITOR) 40 MG tablet Take 1 tablet (40 mg total) by mouth once daily. 90 tablet 1    blood pressure test kit-large Kit 1 Device by Misc.(Non-Drug; Combo Route) route 2 (two) times daily. 1 each 0    blood sugar diagnostic Strp To check BG 2 times daily, to use with insurance preferred meter 200 strip 3    blood-glucose meter kit To check BG 2 times daily, to use with insurance preferred meter 1 each 0    COLCRYS 0.6 mg tablet TAKE 2 TABLETS BY MOUTH AT THE FIRST SIGN OF GOUT FLARE THEN 1  "TABLET 1 HOUR LATER 30 tablet 2    COLCRYS 0.6 mg tablet TAKE 2 TABLETS BY MOUTH BY MOUTH AT  THE  FIRST  SIGN  OF  GOUT  THEN  1  TAB  1  HOUR  LATER 30 tablet 2    diazePAM (VALIUM) 10 MG Tab Take 10 mg by mouth once daily.   0    diclofenac sodium (VOLTAREN) 1 % Gel APPLY  2 GRAMS  TOPICALLY TO AFFECTED AREA ONCE DAILY 300 g 0    diltiaZEM (CARTIA XT) 240 MG 24 hr capsule Take 1 capsule (240 mg total) by mouth once daily. 90 capsule 1    FLUAD 7125-2886, 65 YR UP,,PF, 45 mcg (15 mcg x 3)/0.5 mL Syrg ADM 0.5ML IM UTD  0    fluticasone (FLONASE) 50 mcg/actuation nasal spray       fluticasone-salmeterol diskus inhaler 250-50 mcg INHALE ONE PUFF INTO LUNGS TWICE DAILY 60 each 2    fluticasone-salmeterol diskus inhaler 250-50 mcg INHALE 1 DOSE BY MOUTH TWICE DAILY 60 each 0    FLUZONE HIGH-DOSE 2017-18, PF, 180 mcg/0.5 mL vaccine ADM 0.5ML IM UTD  0    gabapentin (NEURONTIN) 600 MG tablet Take 1 tablet (600 mg total) by mouth 3 (three) times daily. 90 tablet 11    lancets Misc To check BG 2 times daily, to use with insurance preferred meter 200 each 3    levocetirizine (XYZAL) 5 MG tablet TAKE ONE TABLET BY MOUTH ONCE DAILY IN THE EVENING 30 tablet 11    lisinopril-hydrochlorothiazide (PRINZIDE,ZESTORETIC) 20-25 mg Tab Take 1 tablet by mouth once daily. 90 tablet 1    aspirin 325 MG tablet Take 1 tablet (325 mg total) by mouth once daily. (Patient taking differently: Take 81 mg by mouth once daily. )  0    clobetasol 0.05% (TEMOVATE) 0.05 % Oint Apply topically 2 (two) times daily. 45 g 5    [DISCONTINUED] nystatin (MYCOSTATIN) cream APPLY   TOPICALLY TO AFFECTED AREA TWICE DAILY 60 g 0     No current facility-administered medications on file prior to visit.        Objective:      /62   Pulse 90   Ht 5' 9" (1.753 m)   Wt (!) 151.9 kg (334 lb 14.4 oz)   SpO2 98%   BMI 49.46 kg/m²     Exam:  GEN:  Well developed, well nourished.  No acute distress.   HEENT:  No trauma.  Mucous membranes moist. "  Nares patent bilaterally.  PSYCH: Normal affect. Thought content appropriate.  CHEST:  Breathing symmetric.  No audible wheezing.  ABD: Soft, non-distended.  SKIN:  Warm, pink, dry.  No rash on exposed areas.    EXT:  No cyanosis, clubbing, or edema.  No color change or changes in nail or hair growth.  NEURO/MUSCULOSKELETAL:  Fully alert, oriented, and appropriate. Speech normal pérez. No cranial nerve deficits.   Gait:  Normal.  No focal motor deficits.               Imaging:  Narrative     EXAMINATION:  MRI CERVICAL SPINE WITHOUT CONTRAST    CLINICAL HISTORY:  Cervical radiculopathy; Carpal tunnel syndrome, bilateral upper limbs    TECHNIQUE:  Multiplanar, multisequence MR images of the cervical spine were performed without the administration of contrast.  Exam significantly limited secondary to patient motion, body habitus, and technical inability to place coil properly.    COMPARISON:  None.    FINDINGS:  Alignment: Mild straightening of normal cervical lordosis.  There is minimal suspected retrolisthesis of C3 on C4 and C4 on C5.    Vertebrae: Normal marrow signal. No fracture.    Discs: Intervertebral disc height loss and degenerative signal change.    Cord: Normal.    Skull base and craniocervical junction: Normal.    Degenerative findings:    C2-C3: Posterior disc osteophyte complex and moderate left facet arthropathy resulting in moderate left neural foraminal narrowing.  No spinal canal stenosis.    C3-C4: Posterior disc osteophyte complex and facet arthropathy resulting in mild spinal canal stenosis, moderate left neural foraminal narrowing, and mild right neural foraminal narrowing.    C4-C5: Posterior disc osteophyte complex and facet arthropathy resulting in moderate spinal canal stenosis, severe left neural foraminal narrowing, and mild right neural foraminal narrowing.    C5-C6: Posterior disc osteophyte complex and facet arthropathy resulting in moderate spinal canal stenosis and moderate  bilateral neural foraminal narrowing.    C6-C7: Posterior disc osteophyte complex and facet arthropathy resulting in mild-to-moderate moderate spinal canal stenosis and severe bilateral neural foraminal narrowing.    C7-T1: Posterior disc osteophyte complex and facet arthropathy resulting in mild spinal canal stenosis, severe right neural foraminal narrowing, and moderate left neural foraminal narrowing.    Paraspinal muscles & soft tissues: Unremarkable.   Impression       Exam significantly limited secondary to patient motion, body habitus, and technical inability to place coil.    Multilevel degenerative change of the cervical spine with multiple areas of mild to moderate spinal canal stenosis and moderate-severe neural foraminal narrowing as detailed above.    Electronically signed by resident: Isidro Mackenzie  Date: 01/17/2019  Time: 09:45    Electronically signed by: Raymond Plasencia MD  Date: 01/17/2019  Time: 11:39         Assessment:       Encounter Diagnoses   Name Primary?    Cervical radiculopathy Yes    DDD (degenerative disc disease), cervical     Osteoarthritis of spine with radiculopathy, cervical region          Plan:       Bj was seen today for follow-up.    Diagnoses and all orders for this visit:    Cervical radiculopathy  -     Ambulatory Referral to Neurosurgery    DDD (degenerative disc disease), cervical  -     Ambulatory Referral to Neurosurgery    Osteoarthritis of spine with radiculopathy, cervical region  -     Ambulatory Referral to Neurosurgery        Bj Pate Jr. is a 69 y.o. male with chronic bilateral neck and upper extremity pain/numbness.  Likely related to multilevel cervical neuroforaminal stenosis.  Limited relief from cervical epidural steroid injections.    1.  No further interventional procedures at this time.  Patient getting limited relief from repeat cervical epidural steroid injections.  2.  Referred to Neurosurgery to discuss potential surgical options.  3.  May  consider lumbar medial branch radiofrequency ablation in the future if needed.  4.  Return to clinic as needed.

## 2019-09-23 ENCOUNTER — TELEPHONE (OUTPATIENT)
Dept: FAMILY MEDICINE | Facility: CLINIC | Age: 69
End: 2019-09-23

## 2019-09-23 NOTE — TELEPHONE ENCOUNTER
----- Message from Lucy Danielle sent at 9/23/2019 11:56 AM CDT -----  Contact: pt  Type: RX Refill Request    Who Called: pt    Refill or New Rx:lisinopril-hydrochlorothiazide (PRINZIDE,ZESTORETIC) 20-25 mg Tab     RX Name and Strength:    How is the patient currently taking it? (ex. 1XDay):    Is this a 30 day or 90 day RX:    Preferred Pharmacy with phone number:  [facility] [address] [place], [place] - [facility] [address] [place] [address]  Phone: [phone] Fax: [phone]        Local or Mail Order:  Ordering Provider:    Would the patient rather a call back or a response via [facility]? callback    Best Call Back Number:[phone]    Additional Information:

## 2019-09-23 NOTE — TELEPHONE ENCOUNTER
Returned call to patient, brionna noting the requested medication was approved and sent to the pharmacy 8/19/19 as a 90 day with 1 refill

## 2019-10-14 DIAGNOSIS — J44.9 COPD, MODERATE: ICD-10-CM

## 2019-10-14 RX ORDER — FLUTICASONE PROPIONATE AND SALMETEROL 50; 250 UG/1; UG/1
POWDER RESPIRATORY (INHALATION)
Qty: 60 EACH | Refills: 0 | Status: SHIPPED | OUTPATIENT
Start: 2019-10-14 | End: 2019-11-22 | Stop reason: SDUPTHER

## 2019-10-31 ENCOUNTER — PATIENT OUTREACH (OUTPATIENT)
Dept: ADMINISTRATIVE | Facility: OTHER | Age: 69
End: 2019-10-31

## 2019-11-04 ENCOUNTER — OFFICE VISIT (OUTPATIENT)
Dept: NEUROSURGERY | Facility: CLINIC | Age: 69
End: 2019-11-04
Payer: MEDICARE

## 2019-11-04 VITALS
BODY MASS INDEX: 49.26 KG/M2 | DIASTOLIC BLOOD PRESSURE: 81 MMHG | SYSTOLIC BLOOD PRESSURE: 145 MMHG | HEART RATE: 80 BPM | TEMPERATURE: 98 F | WEIGHT: 315 LBS

## 2019-11-04 DIAGNOSIS — M54.12 CERVICAL RADICULOPATHY: Primary | ICD-10-CM

## 2019-11-04 DIAGNOSIS — G56.23 ULNAR NEUROPATHY OF BOTH UPPER EXTREMITIES: ICD-10-CM

## 2019-11-04 DIAGNOSIS — G56.03 CARPAL TUNNEL SYNDROME ON BOTH SIDES: ICD-10-CM

## 2019-11-04 DIAGNOSIS — M50.30 DDD (DEGENERATIVE DISC DISEASE), CERVICAL: ICD-10-CM

## 2019-11-04 PROCEDURE — 1159F PR MEDICATION LIST DOCUMENTED IN MEDICAL RECORD: ICD-10-PCS | Mod: S$GLB,,, | Performed by: NEUROLOGICAL SURGERY

## 2019-11-04 PROCEDURE — 3079F DIAST BP 80-89 MM HG: CPT | Mod: CPTII,S$GLB,, | Performed by: NEUROLOGICAL SURGERY

## 2019-11-04 PROCEDURE — 1159F MED LIST DOCD IN RCRD: CPT | Mod: S$GLB,,, | Performed by: NEUROLOGICAL SURGERY

## 2019-11-04 PROCEDURE — 3077F SYST BP >= 140 MM HG: CPT | Mod: CPTII,S$GLB,, | Performed by: NEUROLOGICAL SURGERY

## 2019-11-04 PROCEDURE — 1125F AMNT PAIN NOTED PAIN PRSNT: CPT | Mod: S$GLB,,, | Performed by: NEUROLOGICAL SURGERY

## 2019-11-04 PROCEDURE — 99999 PR PBB SHADOW E&M-EST. PATIENT-LVL III: ICD-10-PCS | Mod: PBBFAC,,, | Performed by: NEUROLOGICAL SURGERY

## 2019-11-04 PROCEDURE — 99204 OFFICE O/P NEW MOD 45 MIN: CPT | Mod: S$GLB,,, | Performed by: NEUROLOGICAL SURGERY

## 2019-11-04 PROCEDURE — 1101F PR PT FALLS ASSESS DOC 0-1 FALLS W/OUT INJ PAST YR: ICD-10-PCS | Mod: CPTII,S$GLB,, | Performed by: NEUROLOGICAL SURGERY

## 2019-11-04 PROCEDURE — 1125F PR PAIN SEVERITY QUANTIFIED, PAIN PRESENT: ICD-10-PCS | Mod: S$GLB,,, | Performed by: NEUROLOGICAL SURGERY

## 2019-11-04 PROCEDURE — 3077F PR MOST RECENT SYSTOLIC BLOOD PRESSURE >= 140 MM HG: ICD-10-PCS | Mod: CPTII,S$GLB,, | Performed by: NEUROLOGICAL SURGERY

## 2019-11-04 PROCEDURE — 1101F PT FALLS ASSESS-DOCD LE1/YR: CPT | Mod: CPTII,S$GLB,, | Performed by: NEUROLOGICAL SURGERY

## 2019-11-04 PROCEDURE — 99999 PR PBB SHADOW E&M-EST. PATIENT-LVL III: CPT | Mod: PBBFAC,,, | Performed by: NEUROLOGICAL SURGERY

## 2019-11-04 PROCEDURE — 99204 PR OFFICE/OUTPT VISIT, NEW, LEVL IV, 45-59 MIN: ICD-10-PCS | Mod: S$GLB,,, | Performed by: NEUROLOGICAL SURGERY

## 2019-11-04 PROCEDURE — 3079F PR MOST RECENT DIASTOLIC BLOOD PRESSURE 80-89 MM HG: ICD-10-PCS | Mod: CPTII,S$GLB,, | Performed by: NEUROLOGICAL SURGERY

## 2019-11-04 RX ORDER — ASPIRIN 81 MG/1
81 TABLET ORAL DAILY
COMMUNITY

## 2019-11-04 NOTE — LETTER
November 29, 2019      Dariel Doan Jr., MD  [address]  [address]  [facility] [address]           [facility] - Neurosurgery 7th Fl  [address]  [place] [address]  Phone: [phone]          Patient: Bj Pate Jr.   MR Number: [id number]   YOB: 1950   Date of Visit: 11/4/2019       Dear Dr. Dariel Doan Jr.:    Thank you for referring Bj Pate to me for evaluation. Attached you will find relevant portions of my assessment and plan of care.    If you have questions, please do not hesitate to call me. I look forward to following Bj Pate along with you.    Sincerely,    Kajal Carpio MD    Enclosure  CC:  No Recipients    If you would like to receive this communication electronically, please contact [email or web address] or [phone] to request more information on [employer] access.    For providers and/or their staff who would like to refer a patient to [facility], please contact us through our one-stop-shop provider referral line, [facility], at [phone].    If you feel you have received this communication in error or would no longer like to receive these types of communications, please e-mail [email or web address]

## 2019-11-04 NOTE — PROGRESS NOTES
History & Physical    SUBJECTIVE:     Chief Complaint:  Bilateral arm numbness and tingling.    History of Present Illness:  Mr. Pate is a 69-year-old male who was referred to me by .  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  He complains of a longstanding history of neck pain.  However, his neck pain is not his main complaint.  He reports a long-standing history of bilateral arm numbness and tingling which extends down into his hand.  The numbness and tingling is throughout the majority of his hands and fingers but maybe a little bit worse in the ulnar aspect of his hands.  It starts in his upper arm and radiates down.  He denies any numbness that starts in his neck and radiates down.  He really does not have any pain that starts in his neck and radiates down into his arms.  Any type of repetitive activity makes the numbness worse.  He also notices that the numbness is worse at night.  The patient reports sleeping with his elbows bent at night.  If his arms are down by his sides.  The numbness and tingling abates.  He denies any weakness.  He denies any difficulty walking.  He denies any bowel or bladder incontinence.    Review of patient's allergies indicates:   Allergen Reactions    Tomato (solanum lycopersicum) Hives    Naproxen Hives    Shrimp Other (See Comments)       Current Outpatient Medications   Medication Sig Dispense Refill    ADVAIR DISKUS 250-50 mcg/dose diskus inhaler INHALE 1 DOSE BY MOUTH TWICE DAILY 60 each 0    albuterol (PROVENTIL/VENTOLIN HFA) 90 mcg/actuation inhaler Inhale 2 puffs into the lungs every 6 (six) hours as needed for Wheezing. 1 Inhaler 5    aspirin (ECOTRIN) 81 MG EC tablet Take 81 mg by mouth once daily.      atorvastatin (LIPITOR) 40 MG tablet Take 1 tablet (40  mg total) by mouth once daily. 90 tablet 1    blood pressure test kit-large Kit 1 Device by Misc.(Non-Drug; Combo Route) route 2 (two) times daily. 1 each 0    blood sugar diagnostic Strp To check BG 2 times daily, to use with insurance preferred meter 200 strip 3    blood-glucose meter kit To check BG 2 times daily, to use with insurance preferred meter 1 each 0    COLCRYS 0.6 mg tablet TAKE 2 TABLETS BY MOUTH AT THE FIRST SIGN OF GOUT FLARE THEN 1 TABLET 1 HOUR LATER 30 tablet 2    diclofenac sodium (VOLTAREN) 1 % Gel APPLY  2 GRAMS  TOPICALLY TO AFFECTED AREA ONCE DAILY 300 g 0    diltiaZEM (CARTIA XT) 240 MG 24 hr capsule Take 1 capsule (240 mg total) by mouth once daily. 90 capsule 1    fluticasone (FLONASE) 50 mcg/actuation nasal spray       gabapentin (NEURONTIN) 600 MG tablet Take 1 tablet (600 mg total) by mouth 3 (three) times daily. 90 tablet 11    lancets Misc To check BG 2 times daily, to use with insurance preferred meter 200 each 3    lisinopril-hydrochlorothiazide (PRINZIDE,ZESTORETIC) 20-25 mg Tab Take 1 tablet by mouth once daily. 90 tablet 1    clobetasol 0.05% (TEMOVATE) 0.05 % Oint Apply topically 2 (two) times daily. 45 g 5    COLCRYS 0.6 mg tablet TAKE 2 TABLETS BY MOUTH BY MOUTH AT  THE  FIRST  SIGN  OF  GOUT  THEN  1  TAB  1  HOUR  LATER 30 tablet 2    FLUAD 3127-3165, 65 YR UP,,PF, 45 mcg (15 mcg x 3)/0.5 mL Syrg ADM 0.5ML IM UTD  0    FLUZONE HIGH-DOSE 2017-18, PF, 180 mcg/0.5 mL vaccine ADM 0.5ML IM UTD  0    levocetirizine (XYZAL) 5 MG tablet TAKE ONE TABLET BY MOUTH ONCE DAILY IN THE EVENING 30 tablet 11     No current facility-administered medications for this visit.        Past Medical History:   Diagnosis Date    Basal ganglia hemorrhage     Left basal ganglia hemorrhage with resultant right-sided hemiparesis which has resolved.     Benign hypertension with CKD (chronic kidney disease) stage III      Cataract     Chronic idiopathic gout of multiple sites      Chronic kidney disease, stage 3     Erectile dysfunction     Gout     Hemorrhoids without complication     Morbid obesity     Obstructive sleep apnea on CPAP     Stroke 2016, 2006    Thalamic infarct, acute (right) 1/2016    Type 2 DM with CKD stage 3 and hypertension     On pravastatin for cardiovascular protection.      Past Surgical History:   Procedure Laterality Date    EPIDURAL STEROID INJECTION N/A 5/2/2019    Procedure: Injection, Steroid, Epidural Cervical;  Surgeon: Dariel Doan Jr., MD;  Location: [facility] ENDO;  Service: Pain Management;  Laterality: N/A;  Cervical Epidural Steroid Injection     30447    Arrive @ 1130; ASA; Check BG    EPIDURAL STEROID INJECTION Bilateral 5/29/2019    Procedure: Lumbar Medial Branch Blocks;  Surgeon: Dariel Doan Jr., MD;  Location: [facility] ENDO;  Service: Pain Management;  Laterality: Bilateral;  Bilateral Lumbar Medial Branch Blocks L3, L4, L5    31046  [id number]    Attive @ 1030 (11 arrival request); NO Sedation; ASA; Check BG    EPIDURAL STEROID INJECTION Bilateral 7/3/2019    Procedure: Lumbar Medial Branch Blocks;  Surgeon: Dariel Doan Jr., MD;  Location: [facility] ENDO;  Service: Pain Management;  Laterality: Bilateral;  Bilateral Lumbar Medial Branch Blocks L3, L4, L5    07273  [id number]    Arrive @ 0930; NO Sedation; ASA; Check BG    EPIDURAL STEROID INJECTION N/A 8/7/2019    Procedure: Injection, Steroid, Epidural Cervical;  Surgeon: Dariel Doan Jr., MD;  Location: [facility] ENDO;  Service: Pain Management;  Laterality: N/A;  Cervical Epidural Steroid Injection C7-T1    83322    Arrive @ 1115; Last ASA 7/30; Check BG    NO PAST SURGERIES       Family History     Problem Relation (Age of Onset)    Diabetes Paternal Grandfather    Heart disease Paternal Grandfather    Hypertension     No Known Problems Mother, Father, Sister, Brother, Maternal Aunt, Maternal Uncle, Paternal Aunt, Paternal Uncle, Maternal Grandmother, Maternal Grandfather,  Paternal Grandmother        Social History     Socioeconomic History    Marital status:      Spouse name: Not on file    Number of children: 3    Years of education: Not on file    Highest education level: Not on file   Occupational History    Occupation:    Social Needs    Financial resource strain: Not on file    Food insecurity:     Worry: Not on file     Inability: Not on file    Transportation needs:     Medical: Not on file     Non-medical: Not on file   Tobacco Use    Smoking status: Never Smoker    Smokeless tobacco: Never Used   Substance and Sexual Activity    Alcohol use: Yes     Alcohol/week: 0.0 standard drinks     Comment: occacionally    Drug use: No    Sexual activity: Not on file   Lifestyle    Physical activity:     Days per week: Not on file     Minutes per session: Not on file    Stress: Not on file   Relationships    Social connections:     Talks on phone: Not on file     Gets together: Not on file     Attends [employer] service: Not on file     Active member of club or organization: Not on file     Attends meetings of clubs or organizations: Not on file     Relationship status: Not on file   Other Topics Concern    Not on file   Social History Narrative    Not on file       Review of Systems:  Review of Systems   Constitutional: Negative for activity change, diaphoresis, fatigue, fever and unexpected weight change.   HENT: Negative for hearing loss, nosebleeds, tinnitus and trouble swallowing.    Eyes: Negative for visual disturbance.   Respiratory: Positive for shortness of breath. Negative for cough and wheezing.    Cardiovascular: Negative for chest pain and palpitations.   Gastrointestinal: Negative for abdominal distention, abdominal pain, blood in stool, constipation, diarrhea, nausea and vomiting.   Endocrine: Negative for cold intolerance and heat intolerance.   Genitourinary: Negative for difficulty urinating, enuresis, frequency and urgency.    Musculoskeletal: Positive for back pain and neck pain. Negative for gait problem, joint swelling, myalgias and neck stiffness.   Skin: Negative for color change, rash and wound.   Allergic/Immunologic: Negative for environmental allergies and food allergies.   Neurological: Positive for speech difficulty and numbness. Negative for dizziness, seizures, facial asymmetry, weakness, light-headedness and headaches.   Hematological: Does not bruise/bleed easily.   Psychiatric/Behavioral: Negative for agitation, behavioral problems, dysphoric mood and hallucinations. The patient is not nervous/anxious.        OBJECTIVE:     Vital Signs  Temp: 98.1 °F (36.7 °C)  Pulse: 80  BP: (!) 145/81  Pain Score:   7  Weight: (!) 151.3 kg (333 lb 9.6 oz)  Body mass index is 49.26 kg/m².      Physical Exam:  Physical Exam:  Vitals reviewed.    Constitutional: He appears well-developed and well-nourished. No distress.     Eyes: Pupils are equal, round, and reactive to light. Conjunctivae and EOM are normal.     Cardiovascular: Normal rate, regular rhythm, normal pulses and no edema.     Abdominal: Soft. Bowel sounds are normal.     Skin: Skin displays no rash on trunk and no rash on extremities. Skin displays no lesions on trunk and no lesions on extremities.     Psych/Behavior: He is alert. He is oriented to person, place, and time. He has a normal mood and affect.     Musculoskeletal: Gait is normal.        Neck: Range of motion is full. There is no tenderness. Muscle strength is 5/5. Tone is normal.        Back: Range of motion is full. There is no tenderness. Muscle strength is 5/5. Tone is normal.        Right Upper Extremities: Range of motion is full. There is no tenderness. Muscle strength is 5/5. Tone is normal.        Left Upper Extremities: Range of motion is full. There is no tenderness. Muscle strength is 5/5. Tone is normal.       Right Lower Extremities: Range of motion is full. There is no tenderness. Muscle strength is  5/5. Tone is normal.        Left Lower Extremities: Range of motion is full. There is no tenderness. Muscle strength is 5/5. Tone is normal.     Neurological:        Coordination: He has a normal Romberg Test, normal finger to nose coordination and normal tandem walking coordination.        Sensory: There is no sensory deficit in the trunk. There is no sensory deficit in the extremities.        DTRs: DTRs are DTRS NORMAL AND SYMMETRICnormal and symmetric. He displays no Babinski's sign on the right side. He displays no Babinski's sign on the left side.        Cranial nerves: Cranial nerve(s) II, III, IV, V, VI, VII, VIII, IX, X, XI and XII are intact.     No Phalen's.  Positive Tinel's at elbow.  Negative Tinel's at wrist.      Diagnostic Results:  He has an MRI of the cervical spine available for review which I personally reviewed.  There is multilevel cervical spondylosis.  There is no significant cervical stenosis.  There is bilateral neural foraminal narrowing which is mild to moderate at best at C3-4, C4-5, C5-6, and C6-7.    ASSESSMENT/PLAN:     Mr. Pate is a 69-year-old male with bilateral arm and hand numbness and tingling as described above.  He does have a positive Tinel sign at the elbows bilaterally.  Clinically, his complaints sound much more like bilateral ulnar neuropathy that a cervical radiculopathy.  He does have significant cervical spondylosis which could account for some of his neck pain.  However, his neck pain is not his major complaint today.  I would like to get an EMG of his upper extremities to further diagnose radiculopathy versus peripheral neuropathy.  The patient thinks that he had this done relatively recently.  If so, he will for to my office.  If not, we will get this scheduled for him.  I will see him back once I have the EMG results and we will make a further treatment plan at that time.  He knows he can call with any further questions or concerns in the meantime.        Note  dictated with voice recognition software, please excuse any grammatical errors.

## 2019-11-15 ENCOUNTER — OFFICE VISIT (OUTPATIENT)
Dept: FAMILY MEDICINE | Facility: CLINIC | Age: 69
End: 2019-11-15
Payer: MEDICARE

## 2019-11-15 VITALS
WEIGHT: 315 LBS | HEART RATE: 78 BPM | DIASTOLIC BLOOD PRESSURE: 80 MMHG | SYSTOLIC BLOOD PRESSURE: 122 MMHG | HEIGHT: 69 IN | OXYGEN SATURATION: 99 % | RESPIRATION RATE: 16 BRPM | TEMPERATURE: 98 F | BODY MASS INDEX: 46.65 KG/M2

## 2019-11-15 DIAGNOSIS — J00 ACUTE NASOPHARYNGITIS (COMMON COLD): ICD-10-CM

## 2019-11-15 DIAGNOSIS — E66.01 MORBID OBESITY WITH BMI OF 45.0-49.9, ADULT: ICD-10-CM

## 2019-11-15 DIAGNOSIS — I10 ESSENTIAL HYPERTENSION: Primary | ICD-10-CM

## 2019-11-15 PROCEDURE — 3079F PR MOST RECENT DIASTOLIC BLOOD PRESSURE 80-89 MM HG: ICD-10-PCS | Mod: CPTII,S$GLB,, | Performed by: FAMILY MEDICINE

## 2019-11-15 PROCEDURE — 3079F DIAST BP 80-89 MM HG: CPT | Mod: CPTII,S$GLB,, | Performed by: FAMILY MEDICINE

## 2019-11-15 PROCEDURE — 1101F PT FALLS ASSESS-DOCD LE1/YR: CPT | Mod: CPTII,S$GLB,, | Performed by: FAMILY MEDICINE

## 2019-11-15 PROCEDURE — 99214 PR OFFICE/OUTPT VISIT, EST, LEVL IV, 30-39 MIN: ICD-10-PCS | Mod: S$GLB,,, | Performed by: FAMILY MEDICINE

## 2019-11-15 PROCEDURE — 1101F PR PT FALLS ASSESS DOC 0-1 FALLS W/OUT INJ PAST YR: ICD-10-PCS | Mod: CPTII,S$GLB,, | Performed by: FAMILY MEDICINE

## 2019-11-15 PROCEDURE — 99499 RISK ADDL DX/OHS AUDIT: ICD-10-PCS | Mod: S$GLB,,, | Performed by: FAMILY MEDICINE

## 2019-11-15 PROCEDURE — 3074F SYST BP LT 130 MM HG: CPT | Mod: CPTII,S$GLB,, | Performed by: FAMILY MEDICINE

## 2019-11-15 PROCEDURE — 99999 PR PBB SHADOW E&M-EST. PATIENT-LVL III: ICD-10-PCS | Mod: PBBFAC,,, | Performed by: FAMILY MEDICINE

## 2019-11-15 PROCEDURE — 1125F AMNT PAIN NOTED PAIN PRSNT: CPT | Mod: S$GLB,,, | Performed by: FAMILY MEDICINE

## 2019-11-15 PROCEDURE — 1125F PR PAIN SEVERITY QUANTIFIED, PAIN PRESENT: ICD-10-PCS | Mod: S$GLB,,, | Performed by: FAMILY MEDICINE

## 2019-11-15 PROCEDURE — 3074F PR MOST RECENT SYSTOLIC BLOOD PRESSURE < 130 MM HG: ICD-10-PCS | Mod: CPTII,S$GLB,, | Performed by: FAMILY MEDICINE

## 2019-11-15 PROCEDURE — 99214 OFFICE O/P EST MOD 30 MIN: CPT | Mod: S$GLB,,, | Performed by: FAMILY MEDICINE

## 2019-11-15 PROCEDURE — 99999 PR PBB SHADOW E&M-EST. PATIENT-LVL III: CPT | Mod: PBBFAC,,, | Performed by: FAMILY MEDICINE

## 2019-11-15 PROCEDURE — 1159F PR MEDICATION LIST DOCUMENTED IN MEDICAL RECORD: ICD-10-PCS | Mod: S$GLB,,, | Performed by: FAMILY MEDICINE

## 2019-11-15 PROCEDURE — 99499 UNLISTED E&M SERVICE: CPT | Mod: S$GLB,,, | Performed by: FAMILY MEDICINE

## 2019-11-15 PROCEDURE — 1159F MED LIST DOCD IN RCRD: CPT | Mod: S$GLB,,, | Performed by: FAMILY MEDICINE

## 2019-11-15 RX ORDER — DILTIAZEM HYDROCHLORIDE 240 MG/1
240 CAPSULE, COATED, EXTENDED RELEASE ORAL DAILY
Qty: 90 CAPSULE | Refills: 1 | Status: SHIPPED | OUTPATIENT
Start: 2019-11-15 | End: 2020-05-26 | Stop reason: SDUPTHER

## 2019-11-15 RX ORDER — METHYLPREDNISOLONE 4 MG/1
TABLET ORAL
Qty: 1 PACKAGE | Refills: 0 | Status: SHIPPED | OUTPATIENT
Start: 2019-11-15 | End: 2020-01-13

## 2019-11-15 NOTE — PROGRESS NOTES
Health Maintenance Due   Topic     Foot Exam  Foot prep to be examine today if PCP have time       Hemoglobin A1c  Not due until 2/2020    Eye Exam  Pt will call Dr Hook by end of the year to schedule     Zoster: hx chickenpox ; inform pt can get vaccine at pharmacy.

## 2019-11-15 NOTE — PROGRESS NOTES
Chief Complaint   Patient presents with    Sinus Problem     sneezing, cough, congestion over 2 weeks     Diabetic Foot Exam       Bj Pate Jr. is a 69 y.o. male who presents per the Chief Complaint.  Pt is known to me and was last seen by me on 8/19/2019.  All known chronic medical issues have been documented.       Hypertension   This is a chronic problem. The current episode started more than 1 year ago. The problem has been gradually worsening since onset. The problem is uncontrolled. Pertinent negatives include no anxiety, blurred vision, chest pain, headaches, malaise/fatigue, neck pain, orthopnea, palpitations, peripheral edema, PND, shortness of breath or sweats. Agents associated with hypertension include amphetamines. Risk factors for coronary artery disease include obesity, male gender, dyslipidemia and sedentary lifestyle. Past treatments include ACE inhibitors, calcium channel blockers and diuretics. The current treatment provides moderate improvement. Compliance problems include exercise.  There is no history of angina, kidney disease (improved), CAD/MI, CVA, heart failure, left ventricular hypertrophy, PVD or retinopathy. Identifiable causes of hypertension include sleep apnea. There is no history of chronic renal disease (improved), coarctation of the aorta, hyperaldosteronism, hypercortisolism, hyperparathyroidism, a hypertension causing med, pheochromocytoma, renovascular disease or a thyroid problem.        ROS  Review of Systems   Constitutional: Negative for chills, fatigue, fever, malaise/fatigue and weight loss.   HENT: Positive for congestion, postnasal drip, rhinorrhea and sneezing. Negative for dental problem, drooling, ear discharge, ear pain, facial swelling, hearing loss, mouth sores, nosebleeds, sinus pressure, sinus pain, sore throat and trouble swallowing.    Eyes: Negative for blurred vision, pain, discharge and redness.   Respiratory: Positive for cough. Negative for choking,  "chest tightness, shortness of breath, wheezing and stridor.    Cardiovascular: Negative for chest pain, palpitations, orthopnea and PND.   Gastrointestinal: Negative for abdominal pain, constipation, diarrhea, nausea and vomiting.   Endocrine: Negative for polydipsia, polyphagia and polyuria.   Genitourinary: Negative for difficulty urinating, flank pain and urgency.   Musculoskeletal: Negative.  Negative for arthralgias, myalgias, neck pain and neck stiffness.   Skin: Negative.  Negative for pallor.   Allergic/Immunologic: Positive for environmental allergies. Negative for food allergies and immunocompromised state.   Neurological: Negative.  Negative for dizziness, tremors, seizures, speech difficulty, weakness, light-headedness and headaches.   Psychiatric/Behavioral: Positive for sleep disturbance. Negative for confusion. The patient is not nervous/anxious.        Physical Exam  Vitals:    11/15/19 1358   BP: 122/80   Pulse: 78   Resp: 16   Temp: 98.4 °F (36.9 °C)    Body mass index is 48.8 kg/m².  Weight: (!) 149.9 kg (330 lb 7.5 oz)   Height: 5' 9" (175.3 cm)     Physical Exam   Constitutional: He appears well-developed and well-nourished. He is cooperative.  Non-toxic appearance. He does not have a sickly appearance. He does not appear ill. No distress.   HENT:   Head: Normocephalic and atraumatic.   Right Ear: Hearing, external ear and ear canal normal. No tenderness. No foreign bodies. No mastoid tenderness. Tympanic membrane is bulging. Tympanic membrane is not injected, not scarred, not perforated, not erythematous and not retracted. No decreased hearing is noted.   Left Ear: Hearing, external ear and ear canal normal. No tenderness. No foreign bodies. No mastoid tenderness. Tympanic membrane is bulging. Tympanic membrane is not injected, not scarred, not perforated, not erythematous and not retracted. No decreased hearing is noted.   Nose: Mucosal edema and rhinorrhea present. No nose lacerations, sinus " tenderness, nasal deformity, septal deviation or nasal septal hematoma. No epistaxis. Right sinus exhibits no maxillary sinus tenderness and no frontal sinus tenderness. Left sinus exhibits no maxillary sinus tenderness and no frontal sinus tenderness.   Mouth/Throat: Uvula is midline and mucous membranes are normal. He does not have dentures. No oral lesions. No dental abscesses or dental caries. Posterior oropharyngeal erythema present. No oropharyngeal exudate, posterior oropharyngeal edema or tonsillar abscesses.   Eyes: Pupils are equal, round, and reactive to light. Conjunctivae and EOM are normal. Right eye exhibits no chemosis, no discharge and no exudate. No foreign body present in the right eye. Left eye exhibits no chemosis, no discharge and no exudate. No foreign body present in the left eye. No scleral icterus.   Neck: Normal range of motion and full passive range of motion without pain. No neck rigidity.   Cardiovascular: Normal rate, regular rhythm, S1 normal, S2 normal and normal heart sounds. Exam reveals no gallop and no friction rub.   No murmur heard.  Pulmonary/Chest: Effort normal and breath sounds normal. He has no decreased breath sounds. He has no wheezes. He has no rhonchi. He has no rales.   Lymphadenopathy:        Head (right side): No submental, no submandibular, no tonsillar, no preauricular and no posterior auricular adenopathy present.        Head (left side): No submental, no submandibular, no tonsillar, no preauricular and no posterior auricular adenopathy present.   Neurological: He is alert.       Assessment & Plan    Discussion of plan of care including treatment options regarding health and wellness were reviewed and discussed with patient.  Any changes to medication or treatment plan, as well as any screening blood test, imaging, or referrals to specialist, are documented.  Follow up as indicated.     1. Essential hypertension  Patient was counseled and encouraged to maintain a  low sodium diet, as well as increasing physical activity.  Recommend random BP checks at home on a regular basis.  Repeat BP at end of visit was not necessary. Will continue medication at this time, and follow up in 3-6 months, or sooner if blood pressure begins to increase.     - diltiaZEM (CARTIA XT) 240 MG 24 hr capsule; Take 1 capsule (240 mg total) by mouth once daily.  Dispense: 90 capsule; Refill: 1    2. Acute nasopharyngitis (common cold)  Patient was advised to remain hydrated throughout illness and to use an antihistamine like loratadine or cetirizine daily.  I advised that a multi-symptom medication like Nyquil or Tylenol Cold and Flu or a sinus decongestant like Sudafed may raise blood pressure and should be used for no more than three days to alleviate symptoms.  If symptoms worsen, patient should follow up for further evaluation.  Short course of oral steroid given for symptom relief.   - methylPREDNISolone (MEDROL DOSEPACK) 4 mg tablet; use as directed  Dispense: 1 Package; Refill: 0    3. Morbid obesity with BMI of 45.0-49.9, adult  We discussed weight and safe, effective ways of losing pounds, including low carbohydrate, low fat diet and increasing physical activity to improve cardiovascular performance and increase metabolism.  Patient was encouraged to set realistic attainable goals for weight loss, and we will follow up periodically.        Follow up in about 6 months (around 5/15/2020), or if symptoms worsen or fail to improve.        ACTIVE MEDICAL ISSUES:  Documented in Problem List    PAST MEDICAL HISTORY  Documented    PAST SURGICAL HISTORY:  Documented    SOCIAL HISTORY:  Documented    FAMILY HISTORY:  Documented    ALLERGIES AND MEDICATIONS: updated and reviewed.  Documented    Health Maintenance       Date Due Completion Date    Shingles Vaccine (1 of 2) 01/21/2002 ---    Foot Exam 01/30/2019 1/30/2018    Hemoglobin A1c 11/19/2019 8/19/2019    Override on 4/8/2016: Done (future)     Override on 1/4/2016: Done (future)    Override on 8/22/2014: Done    Eye Exam 12/13/2019 12/13/2018    Override on 8/22/2014: Done    Lipid Panel 08/19/2020 8/19/2019    Override on 8/22/2014: Done    Fecal Occult Blood Test (FOBT)/[employer] 08/20/2020 8/20/2019    High Dose Statin 11/15/2020 11/15/2019    TETANUS VACCINE 02/19/2026 2/19/2016

## 2019-11-22 DIAGNOSIS — J44.9 COPD, MODERATE: ICD-10-CM

## 2019-11-22 RX ORDER — FLUTICASONE PROPIONATE AND SALMETEROL 50; 250 UG/1; UG/1
POWDER RESPIRATORY (INHALATION)
Qty: 60 EACH | Refills: 0 | Status: SHIPPED | OUTPATIENT
Start: 2019-11-22 | End: 2019-11-26 | Stop reason: SDUPTHER

## 2019-11-26 DIAGNOSIS — J44.9 COPD, MODERATE: ICD-10-CM

## 2019-11-26 RX ORDER — FLUTICASONE PROPIONATE AND SALMETEROL 50; 250 UG/1; UG/1
POWDER RESPIRATORY (INHALATION)
Qty: 60 EACH | Refills: 0 | Status: SHIPPED | OUTPATIENT
Start: 2019-11-26 | End: 2020-01-27

## 2019-11-27 LAB
LEFT EYE DM RETINOPATHY: NEGATIVE
RIGHT EYE DM RETINOPATHY: NEGATIVE

## 2019-12-04 ENCOUNTER — PATIENT OUTREACH (OUTPATIENT)
Dept: ADMINISTRATIVE | Facility: HOSPITAL | Age: 69
End: 2019-12-04

## 2019-12-04 RX ORDER — ALBUTEROL SULFATE 90 UG/1
AEROSOL, METERED RESPIRATORY (INHALATION)
COMMUNITY
Start: 2019-11-25 | End: 2020-01-15

## 2019-12-05 DIAGNOSIS — E11.9 TYPE 2 DIABETES MELLITUS WITHOUT COMPLICATION: ICD-10-CM

## 2019-12-20 ENCOUNTER — PROCEDURE VISIT (OUTPATIENT)
Dept: NEUROLOGY | Facility: CLINIC | Age: 69
End: 2019-12-20
Payer: MEDICARE

## 2019-12-20 DIAGNOSIS — G56.23 ULNAR NEUROPATHY OF BOTH UPPER EXTREMITIES: ICD-10-CM

## 2019-12-20 DIAGNOSIS — G56.03 CARPAL TUNNEL SYNDROME ON BOTH SIDES: ICD-10-CM

## 2019-12-20 DIAGNOSIS — M50.30 DDD (DEGENERATIVE DISC DISEASE), CERVICAL: ICD-10-CM

## 2019-12-20 DIAGNOSIS — M54.12 CERVICAL RADICULOPATHY: ICD-10-CM

## 2019-12-20 PROCEDURE — 95886 PR EMG COMPLETE, W/ NERVE CONDUCTION STUDIES, 5+ MUSCLES: ICD-10-PCS | Mod: S$GLB,,, | Performed by: PSYCHIATRY & NEUROLOGY

## 2019-12-20 PROCEDURE — 95886 MUSC TEST DONE W/N TEST COMP: CPT | Mod: S$GLB,,, | Performed by: PSYCHIATRY & NEUROLOGY

## 2019-12-20 PROCEDURE — 95913 PR NERVE CONDUCTION STUDY; 13 OR MORE STUDIES: ICD-10-PCS | Mod: S$GLB,,, | Performed by: PSYCHIATRY & NEUROLOGY

## 2019-12-20 PROCEDURE — 95913 NRV CNDJ TEST 13/> STUDIES: CPT | Mod: S$GLB,,, | Performed by: PSYCHIATRY & NEUROLOGY

## 2020-01-02 ENCOUNTER — TELEPHONE (OUTPATIENT)
Dept: NEUROSURGERY | Facility: CLINIC | Age: 70
End: 2020-01-02

## 2020-01-02 NOTE — TELEPHONE ENCOUNTER
----- Message from Page Barros sent at 1/2/2020  2:58 PM CST -----  Contact: Bj Mosley called to f/u on a missed call. [phone]

## 2020-01-07 ENCOUNTER — TELEPHONE (OUTPATIENT)
Dept: NEUROSURGERY | Facility: CLINIC | Age: 70
End: 2020-01-07

## 2020-01-07 NOTE — TELEPHONE ENCOUNTER
----- Message from Magdalena Molina sent at 1/7/2020 10:15 AM CST -----  Contact: Bj Mosley is calling to speak with someone in the office about his appt. Pt contact number is [phone] or [phone]

## 2020-01-12 ENCOUNTER — PATIENT OUTREACH (OUTPATIENT)
Dept: ADMINISTRATIVE | Facility: OTHER | Age: 70
End: 2020-01-12

## 2020-01-13 ENCOUNTER — OFFICE VISIT (OUTPATIENT)
Dept: NEUROSURGERY | Facility: CLINIC | Age: 70
End: 2020-01-13
Payer: MEDICARE

## 2020-01-13 VITALS
DIASTOLIC BLOOD PRESSURE: 71 MMHG | WEIGHT: 315 LBS | TEMPERATURE: 98 F | BODY MASS INDEX: 49.15 KG/M2 | HEART RATE: 83 BPM | SYSTOLIC BLOOD PRESSURE: 147 MMHG

## 2020-01-13 DIAGNOSIS — G56.23 ULNAR NEUROPATHY OF BOTH UPPER EXTREMITIES: ICD-10-CM

## 2020-01-13 DIAGNOSIS — G56.03 CARPAL TUNNEL SYNDROME ON BOTH SIDES: Primary | ICD-10-CM

## 2020-01-13 PROCEDURE — 99999 PR PBB SHADOW E&M-EST. PATIENT-LVL III: ICD-10-PCS | Mod: PBBFAC,,, | Performed by: NEUROLOGICAL SURGERY

## 2020-01-13 PROCEDURE — 3077F PR MOST RECENT SYSTOLIC BLOOD PRESSURE >= 140 MM HG: ICD-10-PCS | Mod: CPTII,S$GLB,, | Performed by: NEUROLOGICAL SURGERY

## 2020-01-13 PROCEDURE — 3078F PR MOST RECENT DIASTOLIC BLOOD PRESSURE < 80 MM HG: ICD-10-PCS | Mod: CPTII,S$GLB,, | Performed by: NEUROLOGICAL SURGERY

## 2020-01-13 PROCEDURE — 3078F DIAST BP <80 MM HG: CPT | Mod: CPTII,S$GLB,, | Performed by: NEUROLOGICAL SURGERY

## 2020-01-13 PROCEDURE — 1159F MED LIST DOCD IN RCRD: CPT | Mod: S$GLB,,, | Performed by: NEUROLOGICAL SURGERY

## 2020-01-13 PROCEDURE — 99213 PR OFFICE/OUTPT VISIT, EST, LEVL III, 20-29 MIN: ICD-10-PCS | Mod: S$GLB,,, | Performed by: NEUROLOGICAL SURGERY

## 2020-01-13 PROCEDURE — 1125F PR PAIN SEVERITY QUANTIFIED, PAIN PRESENT: ICD-10-PCS | Mod: S$GLB,,, | Performed by: NEUROLOGICAL SURGERY

## 2020-01-13 PROCEDURE — 1101F PT FALLS ASSESS-DOCD LE1/YR: CPT | Mod: CPTII,S$GLB,, | Performed by: NEUROLOGICAL SURGERY

## 2020-01-13 PROCEDURE — 1125F AMNT PAIN NOTED PAIN PRSNT: CPT | Mod: S$GLB,,, | Performed by: NEUROLOGICAL SURGERY

## 2020-01-13 PROCEDURE — 3077F SYST BP >= 140 MM HG: CPT | Mod: CPTII,S$GLB,, | Performed by: NEUROLOGICAL SURGERY

## 2020-01-13 PROCEDURE — 1159F PR MEDICATION LIST DOCUMENTED IN MEDICAL RECORD: ICD-10-PCS | Mod: S$GLB,,, | Performed by: NEUROLOGICAL SURGERY

## 2020-01-13 PROCEDURE — 1101F PR PT FALLS ASSESS DOC 0-1 FALLS W/OUT INJ PAST YR: ICD-10-PCS | Mod: CPTII,S$GLB,, | Performed by: NEUROLOGICAL SURGERY

## 2020-01-13 PROCEDURE — 99213 OFFICE O/P EST LOW 20 MIN: CPT | Mod: S$GLB,,, | Performed by: NEUROLOGICAL SURGERY

## 2020-01-13 PROCEDURE — 99999 PR PBB SHADOW E&M-EST. PATIENT-LVL III: CPT | Mod: PBBFAC,,, | Performed by: NEUROLOGICAL SURGERY

## 2020-01-13 NOTE — PROGRESS NOTES
Established Pateint    SUBJECTIVE:     History of Present Illness:  Mr. Pate is a 69-year-old male who is seeing me today in follow-up.  His last neurosurgery clinic appointment was on November 4, 2019.  He d did complain of a longstanding history of neck pain at that time.  However, his neck pain was not his main complaint.  More poorly, he reported a longstanding history of bilateral arm numbness and tingling which extends down into his hands.  The numbness and tingling is throughout the majority of his hands and fingers but maybe a little bit worse in the ulnar aspects of his hands.  It starts in his upper arm and radiates past his elbow and into his hands.  He denies any radicular pain.  Any type of repetitive activity makes the pain and numbness worse in his hands.  His numbness is also worse at night and when his elbows are flexed.  He had an MRI of the cervical spine available for review at the time of his last clinic appointment which showed multilevel cervical spondylosis with multilevel neural foraminal narrowing.  I did not feel that his MRI corresponded with his complaints of paresthesias and pain.  Therefore, I had sent the patient for an EMG of the upper extremities.  He is here today to see me in follow-up.  He continues to complain of mainly pain and paresthesias in his upper arms radiating down into his hands bilaterally.  He denies any current neck pain.  He does complain of clumsiness and weakness of his hands due to the pain and paresthesias.  He has been wearing wrist splint at night for the past couple of months.  This does not seem to have helped with the paresthesias in his hands.    Review of patient's allergies indicates:   Allergen Reactions    Tomato (solanum lycopersicum) Hives    Naproxen Hives    Shrimp Other (See Comments)       Current Outpatient Medications   Medication Sig Dispense Refill    ADVAIR DISKUS 250-50 mcg/dose diskus inhaler INHALE 1 DOSE BY MOUTH TWICE DAILY 60  each 0    albuterol (PROVENTIL/VENTOLIN HFA) 90 mcg/actuation inhaler       aspirin (ECOTRIN) 81 MG EC tablet Take 81 mg by mouth once daily.      atorvastatin (LIPITOR) 40 MG tablet Take 1 tablet (40 mg total) by mouth once daily. 90 tablet 1    blood pressure test kit-large Kit 1 Device by Misc.(Non-Drug; Combo Route) route 2 (two) times daily. 1 each 0    blood sugar diagnostic Strp To check BG 2 times daily, to use with insurance preferred meter 200 strip 3    blood-glucose meter kit To check BG 2 times daily, to use with insurance preferred meter 1 each 0    COLCRYS 0.6 mg tablet TAKE 2 TABLETS BY MOUTH BY MOUTH AT  THE  FIRST  SIGN  OF  GOUT  THEN  1  TAB  1  HOUR  LATER 30 tablet 2    diclofenac sodium (VOLTAREN) 1 % Gel APPLY  2 GRAMS  TOPICALLY TO AFFECTED AREA ONCE DAILY 300 g 0    diltiaZEM (CARTIA XT) 240 MG 24 hr capsule Take 1 capsule (240 mg total) by mouth once daily. 90 capsule 1    gabapentin (NEURONTIN) 600 MG tablet Take 1 tablet (600 mg total) by mouth 3 (three) times daily. 90 tablet 11    lancets Misc To check BG 2 times daily, to use with insurance preferred meter 200 each 3    lisinopril-hydrochlorothiazide (PRINZIDE,ZESTORETIC) 20-25 mg Tab Take 1 tablet by mouth once daily. 90 tablet 1    clobetasol 0.05% (TEMOVATE) 0.05 % Oint Apply topically 2 (two) times daily. 45 g 5    FLUAD [id number], 65 YR UP,,PF, 45 mcg (15 mcg x 3)/0.5 mL Syrg ADM 0.5ML IM UTD  0    fluticasone (FLONASE) 50 mcg/actuation nasal spray 1 spray as needed.       FLUZONE HIGH-DOSE 2017-18, PF, 180 mcg/0.5 mL vaccine ADM 0.5ML IM UTD  0    levocetirizine (XYZAL) 5 MG tablet TAKE ONE TABLET BY MOUTH ONCE DAILY IN THE EVENING (Patient not taking: Reported on 1/13/2020) 30 tablet 11     No current facility-administered medications for this visit.        Past Medical History:   Diagnosis Date    Basal ganglia hemorrhage     Left basal ganglia hemorrhage with resultant right-sided hemiparesis which has  resolved.     Benign hypertension with CKD (chronic kidney disease) stage III      Cataract     Chronic idiopathic gout of multiple sites     Chronic kidney disease, stage 3     Erectile dysfunction     Gout     Hemorrhoids without complication     Hyperlipidemia     Morbid obesity     Obstructive sleep apnea on CPAP     Stroke 2016, 2006    Thalamic infarct, acute (right) 1/2016    Type 2 DM with CKD stage 3 and hypertension     On pravastatin for cardiovascular protection.      Past Surgical History:   Procedure Laterality Date    EPIDURAL STEROID INJECTION N/A 5/2/2019    Procedure: Injection, Steroid, Epidural Cervical;  Surgeon: Dariel Doan Jr., MD;  Location: [facility] ENDO;  Service: Pain Management;  Laterality: N/A;  Cervical Epidural Steroid Injection     [id number]    Arrive @ 1130; ASA; Check BG    EPIDURAL STEROID INJECTION Bilateral 5/29/2019    Procedure: Lumbar Medial Branch Blocks;  Surgeon: Dariel Doan Jr., MD;  Location: [facility] ENDO;  Service: Pain Management;  Laterality: Bilateral;  Bilateral Lumbar Medial Branch Blocks L3, L4, L5    27180  [id number]    Attive @ 1030 (11 arrival request); NO Sedation; ASA; Check BG    EPIDURAL STEROID INJECTION Bilateral 7/3/2019    Procedure: Lumbar Medial Branch Blocks;  Surgeon: Dariel Doan Jr., MD;  Location: [facility] ENDO;  Service: Pain Management;  Laterality: Bilateral;  Bilateral Lumbar Medial Branch Blocks L3, L4, L5    [id number]  [id number]    Arrive @ 0930; NO Sedation; ASA; Check BG    EPIDURAL STEROID INJECTION N/A 8/7/2019    Procedure: Injection, Steroid, Epidural Cervical;  Surgeon: Dariel Doan Jr., MD;  Location: [facility] ENDO;  Service: Pain Management;  Laterality: N/A;  Cervical Epidural Steroid Injection C7-T1    37542    Arrive @ 1115; Last ASA 7/30; Check BG    NO PAST SURGERIES       Family History     Problem Relation (Age of Onset)    Diabetes Paternal Grandfather    Heart disease Paternal Grandfather    Hypertension      No Known Problems Mother, Father, Sister, Brother, Maternal Aunt, Maternal Uncle, Paternal Aunt, Paternal Uncle, Maternal Grandmother, Maternal Grandfather, Paternal Grandmother        Social History     Socioeconomic History    Marital status:      Spouse name: Not on file    Number of children: 3    Years of education: Not on file    Highest education level: Not on file   Occupational History    Occupation:    Social Needs    Financial resource strain: Not on file    Food insecurity:     Worry: Not on file     Inability: Not on file    Transportation needs:     Medical: Not on file     Non-medical: Not on file   Tobacco Use    Smoking status: Never Smoker    Smokeless tobacco: Never Used   Substance and Sexual Activity    Alcohol use: Yes     Alcohol/week: 0.0 standard drinks     Comment: occacionally    Drug use: No    Sexual activity: Not on file   Lifestyle    Physical activity:     Days per week: Not on file     Minutes per session: Not on file    Stress: Not on file   Relationships    Social connections:     Talks on phone: Not on file     Gets together: Not on file     Attends [employer] service: Not on file     Active member of club or organization: Not on file     Attends meetings of clubs or organizations: Not on file     Relationship status: Not on file   Other Topics Concern    Not on file   Social History Narrative    Not on file       Review of Systems:  Review of Systems    OBJECTIVE:     Vital Signs  Temp: 98.4 °F (36.9 °C)  Pulse: 83  BP: (!) 147/71  Pain Score:   6  Weight: (!) 151 kg (332 lb 12.8 oz)  Body mass index is 49.15 kg/m².    Physical Exam:  Physical Exam:  Vitals reviewed.    Constitutional: He appears well-developed and well-nourished. No distress.     Eyes: Pupils are equal, round, and reactive to light. Conjunctivae and EOM are normal.     Cardiovascular: Normal rate, regular rhythm, normal pulses and no edema.     Abdominal: Soft. Bowel  sounds are normal.     Skin: Skin displays no rash on trunk and no rash on extremities. Skin displays no lesions on trunk and no lesions on extremities.     Psych/Behavior: He is alert. He is oriented to person, place, and time. He has a normal mood and affect.     Musculoskeletal: Gait is normal.        Neck: Range of motion is full. There is no tenderness. Muscle strength is 5/5. Tone is normal.        Back: Range of motion is full. There is no tenderness. Muscle strength is 5/5. Tone is normal.        Right Upper Extremities: Range of motion is full. There is no tenderness. Muscle strength is 5/5. Tone is normal.        Left Upper Extremities: Range of motion is full. There is no tenderness. Muscle strength is 5/5. Tone is normal.       Right Lower Extremities: Range of motion is full. There is no tenderness. Muscle strength is 5/5. Tone is normal.        Left Lower Extremities: Range of motion is full. There is no tenderness. Muscle strength is 5/5. Tone is normal.     Neurological:        Coordination: He has a normal Romberg Test, normal finger to nose coordination and normal tandem walking coordination.        Sensory: There is no sensory deficit in the trunk. There is no sensory deficit in the extremities.        DTRs: DTRs are DTRS NORMAL AND SYMMETRICnormal and symmetric. He displays no Babinski's sign on the right side. He displays no Babinski's sign on the left side.        Cranial nerves: Cranial nerve(s) II, III, IV, V, VI, VII, VIII, IX, X, XI and XII are intact.     He has some wasting of the thenar eminences bilaterally.  He has no Phalen's.  He has a positive Tinel's at the elbows and negative Tinel's at the wrists.    Diagnostic Results:  He has an EMG of the upper extremities which I personally reviewed.  This shows severe bilateral carpal tunnel syndrome with active and chronic denervation in bilateral APB muscles.  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    ASSESSMENT/PLAN:     Mr. Pate is a 69-year-old male with pain and paresthesias of the bilateral upper extremities as described above.  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  He has failed wrist splinting.  Perhaps he would benefit from physical therapy and or steroid injections at the carpal tunnel.  However, given his subjective weakness as well as muscle wasting in his hands, I would like him to see our hand surgeon, Dr. Mccurdy, for surgical evaluation.  There is no need for any further neurosurgical follow-up at this time.  I will see him on an as-needed basis.  He knows he can call with any further questions or concerns.        Note dictated with voice recognition software, please excuse any grammatical errors.

## 2020-01-14 ENCOUNTER — TELEPHONE (OUTPATIENT)
Dept: ORTHOPEDICS | Facility: CLINIC | Age: 70
End: 2020-01-14

## 2020-01-14 DIAGNOSIS — M25.521 BILATERAL ELBOW JOINT PAIN: ICD-10-CM

## 2020-01-14 DIAGNOSIS — M25.522 BILATERAL ELBOW JOINT PAIN: ICD-10-CM

## 2020-01-14 DIAGNOSIS — M25.531 BILATERAL WRIST PAIN: Primary | ICD-10-CM

## 2020-01-14 DIAGNOSIS — M25.532 BILATERAL WRIST PAIN: Primary | ICD-10-CM

## 2020-01-14 NOTE — TELEPHONE ENCOUNTER
----- Message from Leonor Cavanaugh MA sent at 1/14/2020  9:58 AM CST -----  Sorry this is the patient from Dr. Carpio.    Thanks

## 2020-01-15 RX ORDER — ALBUTEROL SULFATE 90 UG/1
2 AEROSOL, METERED RESPIRATORY (INHALATION) EVERY 6 HOURS PRN
Qty: 1 EACH | Refills: 11 | Status: SHIPPED | OUTPATIENT
Start: 2020-01-15 | End: 2020-04-21 | Stop reason: SDUPTHER

## 2020-01-15 NOTE — TELEPHONE ENCOUNTER
----- Message from Chari Plaza sent at 1/15/2020  1:18 PM CST -----  Contact: Patient ph [phone]  Type: Patient Call Back    Who called: Patient    What is the request in detail: Would like to speak to nurse in regards to medication, albuterol (PROVENTIL/VENTOLIN HFA) 90 mcg/actuation inhaler, that is no longer covered by his insurance. Please call.    Would the patient rather a call back or a response via [facility]? Call back    Best call back number: [phone]

## 2020-01-26 DIAGNOSIS — J44.9 COPD, MODERATE: ICD-10-CM

## 2020-01-27 ENCOUNTER — TELEPHONE (OUTPATIENT)
Dept: FAMILY MEDICINE | Facility: CLINIC | Age: 70
End: 2020-01-27

## 2020-01-27 DIAGNOSIS — J44.9 COPD, MODERATE: ICD-10-CM

## 2020-01-27 RX ORDER — FLUTICASONE PROPIONATE AND SALMETEROL 50; 250 UG/1; UG/1
POWDER RESPIRATORY (INHALATION)
Qty: 60 EACH | Refills: 3 | Status: SHIPPED | OUTPATIENT
Start: 2020-01-27 | End: 2020-05-04 | Stop reason: SDUPTHER

## 2020-01-27 RX ORDER — FLUTICASONE PROPIONATE AND SALMETEROL 50; 250 UG/1; UG/1
POWDER RESPIRATORY (INHALATION)
Qty: 60 EACH | Refills: 3 | Status: SHIPPED | OUTPATIENT
Start: 2020-01-27 | End: 2020-01-27 | Stop reason: SDUPTHER

## 2020-01-27 NOTE — TELEPHONE ENCOUNTER
Spoke topt, he states received letter from [facility] regarding advair disk; I called pharmacy and was told it is covered at $45 copay for 1 month; pharmacy is out of medication; [facility] [address] since he is almost out

## 2020-01-27 NOTE — TELEPHONE ENCOUNTER
----- Message from Elizabeth Knapp sent at 1/27/2020  3:40 PM CST -----  Contact: [facility]  Type:  Pharmacy Calling to Clarify an RX    Name of Caller: Anupama    Pharmacy Name: [facility]    Prescription Name:  ADVAIR DISKUS 250-50 mcg/dose diskus inhaler    What do they need to clarify?  Please add directions    Can you be contacted via [facility]? no    Best Call Back Number:  [phone]

## 2020-01-27 NOTE — TELEPHONE ENCOUNTER
----- Message from Bita Pulido sent at 1/27/2020  1:40 PM CST -----  Contact: Self   Type: Patient Call Back    Who called: Self     What is the request in detail:patient would like to know the status of his PA for his inhaler  (PROVENTIL/VENTOLIN HFA) 90 mcg    Can the clinic reply by [facility]? No     Would the patient rather a call back or a response via [facility]?  Call     Best call back number:[phone]

## 2020-02-03 ENCOUNTER — PATIENT OUTREACH (OUTPATIENT)
Dept: ADMINISTRATIVE | Facility: OTHER | Age: 70
End: 2020-02-03

## 2020-02-03 ENCOUNTER — TELEPHONE (OUTPATIENT)
Dept: ORTHOPEDICS | Facility: CLINIC | Age: 70
End: 2020-02-03

## 2020-02-04 ENCOUNTER — HOSPITAL ENCOUNTER (OUTPATIENT)
Dept: RADIOLOGY | Facility: OTHER | Age: 70
Discharge: HOME | End: 2020-02-04
Attending: ORTHOPAEDIC SURGERY
Payer: MEDICARE

## 2020-02-04 ENCOUNTER — OFFICE VISIT (OUTPATIENT)
Dept: ORTHOPEDICS | Facility: CLINIC | Age: 70
End: 2020-02-04
Payer: MEDICARE

## 2020-02-04 VITALS
SYSTOLIC BLOOD PRESSURE: 151 MMHG | DIASTOLIC BLOOD PRESSURE: 81 MMHG | HEART RATE: 96 BPM | WEIGHT: 315 LBS | BODY MASS INDEX: 46.65 KG/M2 | HEIGHT: 69 IN

## 2020-02-04 DIAGNOSIS — M25.521 BILATERAL ELBOW JOINT PAIN: ICD-10-CM

## 2020-02-04 DIAGNOSIS — M77.12 LEFT LATERAL EPICONDYLITIS: ICD-10-CM

## 2020-02-04 DIAGNOSIS — G56.21 ULNAR NEUROPATHY OF RIGHT UPPER EXTREMITY: ICD-10-CM

## 2020-02-04 DIAGNOSIS — M25.522 BILATERAL ELBOW JOINT PAIN: ICD-10-CM

## 2020-02-04 DIAGNOSIS — M25.531 BILATERAL WRIST PAIN: ICD-10-CM

## 2020-02-04 DIAGNOSIS — G56.03 CARPAL TUNNEL SYNDROME ON BOTH SIDES: Primary | ICD-10-CM

## 2020-02-04 DIAGNOSIS — M25.532 BILATERAL WRIST PAIN: ICD-10-CM

## 2020-02-04 PROCEDURE — 73110 X-RAY EXAM OF WRIST: CPT | Mod: 26,50,, | Performed by: RADIOLOGY

## 2020-02-04 PROCEDURE — 3079F PR MOST RECENT DIASTOLIC BLOOD PRESSURE 80-89 MM HG: ICD-10-PCS | Mod: CPTII,S$GLB,, | Performed by: ORTHOPAEDIC SURGERY

## 2020-02-04 PROCEDURE — 73110 X-RAY EXAM OF WRIST: CPT | Mod: 50,TC,FY

## 2020-02-04 PROCEDURE — 99999 PR PBB SHADOW E&M-EST. PATIENT-LVL IV: ICD-10-PCS | Mod: PBBFAC,,, | Performed by: ORTHOPAEDIC SURGERY

## 2020-02-04 PROCEDURE — 1101F PR PT FALLS ASSESS DOC 0-1 FALLS W/OUT INJ PAST YR: ICD-10-PCS | Mod: CPTII,S$GLB,, | Performed by: ORTHOPAEDIC SURGERY

## 2020-02-04 PROCEDURE — 1101F PT FALLS ASSESS-DOCD LE1/YR: CPT | Mod: CPTII,S$GLB,, | Performed by: ORTHOPAEDIC SURGERY

## 2020-02-04 PROCEDURE — 1159F MED LIST DOCD IN RCRD: CPT | Mod: S$GLB,,, | Performed by: ORTHOPAEDIC SURGERY

## 2020-02-04 PROCEDURE — 3077F PR MOST RECENT SYSTOLIC BLOOD PRESSURE >= 140 MM HG: ICD-10-PCS | Mod: CPTII,S$GLB,, | Performed by: ORTHOPAEDIC SURGERY

## 2020-02-04 PROCEDURE — 1125F PR PAIN SEVERITY QUANTIFIED, PAIN PRESENT: ICD-10-PCS | Mod: S$GLB,,, | Performed by: ORTHOPAEDIC SURGERY

## 2020-02-04 PROCEDURE — 73080 XR ELBOW COMPLETE 3 VIEW BILATERAL: ICD-10-PCS | Mod: 26,50,, | Performed by: RADIOLOGY

## 2020-02-04 PROCEDURE — 3077F SYST BP >= 140 MM HG: CPT | Mod: CPTII,S$GLB,, | Performed by: ORTHOPAEDIC SURGERY

## 2020-02-04 PROCEDURE — 1159F PR MEDICATION LIST DOCUMENTED IN MEDICAL RECORD: ICD-10-PCS | Mod: S$GLB,,, | Performed by: ORTHOPAEDIC SURGERY

## 2020-02-04 PROCEDURE — 3079F DIAST BP 80-89 MM HG: CPT | Mod: CPTII,S$GLB,, | Performed by: ORTHOPAEDIC SURGERY

## 2020-02-04 PROCEDURE — 73110 XR WRIST COMPLETE 3 VIEWS BILATERAL: ICD-10-PCS | Mod: 26,50,, | Performed by: RADIOLOGY

## 2020-02-04 PROCEDURE — 73080 X-RAY EXAM OF ELBOW: CPT | Mod: 26,50,, | Performed by: RADIOLOGY

## 2020-02-04 PROCEDURE — 99999 PR PBB SHADOW E&M-EST. PATIENT-LVL IV: CPT | Mod: PBBFAC,,, | Performed by: ORTHOPAEDIC SURGERY

## 2020-02-04 PROCEDURE — 99214 PR OFFICE/OUTPT VISIT, EST, LEVL IV, 30-39 MIN: ICD-10-PCS | Mod: S$GLB,,, | Performed by: ORTHOPAEDIC SURGERY

## 2020-02-04 PROCEDURE — 1125F AMNT PAIN NOTED PAIN PRSNT: CPT | Mod: S$GLB,,, | Performed by: ORTHOPAEDIC SURGERY

## 2020-02-04 PROCEDURE — 99214 OFFICE O/P EST MOD 30 MIN: CPT | Mod: S$GLB,,, | Performed by: ORTHOPAEDIC SURGERY

## 2020-02-04 PROCEDURE — 73080 X-RAY EXAM OF ELBOW: CPT | Mod: 50,TC,FY

## 2020-02-04 NOTE — LETTER
February 12, 2020      Kajal Carpio MD  [address]  [facility] [address]           [facility] [address], [address]  [facility] [address]  Phone: [phone]          Patient: Bj Pate Jr.   MR Number: [id number]   YOB: 1950   Date of Visit: 2/4/2020       Dear Dr. Kajal Carpio:    Thank you for referring Bj Pate to me for evaluation. Attached you will find relevant portions of my assessment and plan of care.    If you have questions, please do not hesitate to call me. I look forward to following Bj Pate along with you.    Sincerely,    Maria Luisa Mccurdy MD    Enclosure  CC:  No Recipients    If you would like to receive this communication electronically, please contact [email or web address] or [phone] to request more information on [employer] Link access.    For providers and/or their staff who would like to refer a patient to [facility], please contact us through our one-stop-shop provider referral line, [facility], at [phone].    If you feel you have received this communication in error or would no longer like to receive these types of communications, please e-mail [email or web address]

## 2020-02-04 NOTE — PROGRESS NOTES
I have personally taken the history and examined this patient. I agree with the resident's note as stated above. Plan for left CTR, strap for left elbow lateral epicondylitis.  I have explained the risks, benefits, and alternatives of the procedure to the patient in great detail. The patient voices understanding and all questions have been answered. The patient agrees with to proceed as planned. Consents were performed in clinic.  Plan for right CTR and ulnar nerve decompression in the future.

## 2020-02-05 DIAGNOSIS — G56.00 CARPAL TUNNEL SYNDROME, UNSPECIFIED LATERALITY: Primary | ICD-10-CM

## 2020-02-05 NOTE — H&P (VIEW-ONLY)
DATE: 2/4/2020  PATIENT: Bj Pate Jr.    CHIEF COMPLAINT: bilateral hand numbness, L>R    HISTORY:  Bj Pate Jr. is a 70 y.o. male here for initial evaluation of bilateral hand numbness, left > right. The pain has been present for many years. There is no history of trauma. The patient describes the pain as achy.  The pain is worse with activities and at time and improved by rest. There is associated numbness and tingling.  Prior treatments have included none.      PAST MEDICAL/SURGICAL HISTORY:  Past Medical History:   Diagnosis Date    Basal ganglia hemorrhage     Left basal ganglia hemorrhage with resultant right-sided hemiparesis which has resolved.     Benign hypertension with CKD (chronic kidney disease) stage III      Cataract     Chronic idiopathic gout of multiple sites     Chronic kidney disease, stage 3     Erectile dysfunction     Gout     Hemorrhoids without complication     Hyperlipidemia     Morbid obesity     Obstructive sleep apnea on CPAP     Stroke 2016, 2006    Thalamic infarct, acute (right) 1/2016    Type 2 DM with CKD stage 3 and hypertension     On pravastatin for cardiovascular protection.      Past Surgical History:   Procedure Laterality Date    EPIDURAL STEROID INJECTION N/A 5/2/2019    Procedure: Injection, Steroid, Epidural Cervical;  Surgeon: Dariel Doan Jr., MD;  Location: [facility] ENDO;  Service: Pain Management;  Laterality: N/A;  Cervical Epidural Steroid Injection     [id number]    Arrive @ 1130; ASA; Check BG    EPIDURAL STEROID INJECTION Bilateral 5/29/2019    Procedure: Lumbar Medial Branch Blocks;  Surgeon: Dariel Doan Jr., MD;  Location: [facility] ENDO;  Service: Pain Management;  Laterality: Bilateral;  Bilateral Lumbar Medial Branch Blocks L3, L4, L5    67777  04589    Attive @ 1030 (11 arrival request); NO Sedation; ASA; Check BG    EPIDURAL STEROID INJECTION Bilateral 7/3/2019    Procedure: Lumbar Medial Branch Blocks;  Surgeon: Dariel RODRÍGUEZ  Olimpia Gutierrez MD;  Location: [facility] ENDO;  Service: Pain Management;  Laterality: Bilateral;  Bilateral Lumbar Medial Branch Blocks L3, L4, L5    18829  79922    Arrive @ 0930; NO Sedation; ASA; Check BG    EPIDURAL STEROID INJECTION N/A 8/7/2019    Procedure: Injection, Steroid, Epidural Cervical;  Surgeon: Dariel Doan Jr., MD;  Location: [facility] ENDO;  Service: Pain Management;  Laterality: N/A;  Cervical Epidural Steroid Injection C7-T1    75706    Arrive @ 1115; Last ASA 7/30; Check BG    NO PAST SURGERIES         Current Medications:   Current Outpatient Medications:     ADVAIR DISKUS 250-50 mcg/dose diskus inhaler, Controller, Disp: 60 each, Rfl: 3    albuterol (PROAIR HFA) 90 mcg/actuation inhaler, Inhale 2 puffs into the lungs every 6 (six) hours as needed for Wheezing. Rescue, Disp: 1 each, Rfl: 11    aspirin (ECOTRIN) 81 MG EC tablet, Take 81 mg by mouth once daily., Disp: , Rfl:     atorvastatin (LIPITOR) 40 MG tablet, Take 1 tablet (40 mg total) by mouth once daily., Disp: 90 tablet, Rfl: 1    blood pressure test kit-large Kit, 1 Device by Misc.(Non-Drug; Combo Route) route 2 (two) times daily., Disp: 1 each, Rfl: 0    blood sugar diagnostic Strp, To check BG 2 times daily, to use with insurance preferred meter, Disp: 200 strip, Rfl: 3    blood-glucose meter kit, To check BG 2 times daily, to use with insurance preferred meter, Disp: 1 each, Rfl: 0    COLCRYS 0.6 mg tablet, TAKE 2 TABLETS BY MOUTH BY MOUTH AT  THE  FIRST  SIGN  OF  GOUT  THEN  1  TAB  1  HOUR  LATER, Disp: 30 tablet, Rfl: 2    diclofenac sodium (VOLTAREN) 1 % Gel, APPLY  2 GRAMS  TOPICALLY TO AFFECTED AREA ONCE DAILY, Disp: 300 g, Rfl: 0    diltiaZEM (CARTIA XT) 240 MG 24 hr capsule, Take 1 capsule (240 mg total) by mouth once daily., Disp: 90 capsule, Rfl: 1    FLUAD [id number], 65 YR UP,,PF, 45 mcg (15 mcg x 3)/0.5 mL Syrg, ADM 0.5ML IM UTD, Disp: , Rfl: 0    fluticasone (FLONASE) 50 mcg/actuation nasal spray, 1 spray  as needed. , Disp: , Rfl:     FLUZONE HIGH-DOSE 2017-18, PF, 180 mcg/0.5 mL vaccine, ADM 0.5ML IM UTD, Disp: , Rfl: 0    gabapentin (NEURONTIN) 600 MG tablet, Take 1 tablet (600 mg total) by mouth 3 (three) times daily., Disp: 90 tablet, Rfl: 11    lancets Misc, To check BG 2 times daily, to use with insurance preferred meter, Disp: 200 each, Rfl: 3    levocetirizine (XYZAL) 5 MG tablet, TAKE ONE TABLET BY MOUTH ONCE DAILY IN THE EVENING, Disp: 30 tablet, Rfl: 11    lisinopril-hydrochlorothiazide (PRINZIDE,ZESTORETIC) 20-25 mg Tab, Take 1 tablet by mouth once daily., Disp: 90 tablet, Rfl: 1    clobetasol 0.05% (TEMOVATE) 0.05 % Oint, Apply topically 2 (two) times daily., Disp: 45 g, Rfl: 5    Social History:   Social History     Socioeconomic History    Marital status:      Spouse name: Not on file    Number of children: 3    Years of education: Not on file    Highest education level: Not on file   Occupational History    Occupation:    Social Needs    Financial resource strain: Not on file    Food insecurity:     Worry: Not on file     Inability: Not on file    Transportation needs:     Medical: Not on file     Non-medical: Not on file   Tobacco Use    Smoking status: Never Smoker    Smokeless tobacco: Never Used   Substance and Sexual Activity    Alcohol use: Yes     Alcohol/week: 0.0 standard drinks     Comment: occacionally    Drug use: No    Sexual activity: Not on file   Lifestyle    Physical activity:     Days per week: Not on file     Minutes per session: Not on file    Stress: Not on file   Relationships    Social connections:     Talks on phone: Not on file     Gets together: Not on file     Attends [employer] service: Not on file     Active member of club or organization: Not on file     Attends meetings of clubs or organizations: Not on file     Relationship status: Not on file   Other Topics Concern    Not on file   Social History Narrative    Not on file  "      REVIEW OF SYSTEMS:  Constitution: Negative. Negative for chills, fever and night sweats.   Cardiovascular: Negative for chest pain and syncope.   Respiratory: Negative for cough and shortness of breath.   Gastrointestinal: See HPI. Negative for nausea/vomiting. Negative for abdominal pain.  Genitourinary: See HPI. Negative for discoloration or dysuria.  Skin: Negative for dry skin, itching and rash.   Hematologic/Lymphatic: Negative for bleeding problem. Does not bruise/bleed easily.   Musculoskeletal: Negative for falls and muscle weakness.   Neurological: See HPI. No seizures.   Endocrine: Negative for polydipsia, polyphagia and polyuria.   Allergic/Immunologic: Negative for hives and persistent infections.    PHYSICAL EXAMINATION:    BP (!) 151/81   Pulse 96   Ht 5' 9" (1.753 m)   Wt (!) 150.6 kg (332 lb)   BMI 49.03 kg/m²     General: The patient is a 70 y.o. male in no apparent distress, the patient is orientatied to person, place and time.   Psych: Normal mood and affect  HEENT:  NCAT  Lungs:  Respirations are equal and unlabored.  CV:  2+ bilateral upper and lower extremity pulses.  Skin:  Intact throughout.    MSK:  Positive Tinel and Phalen at bilateral wrists  Positive Tinel on right  Tenderness of left lateral epicondylitis, pain with resisted wrist dorsiflexion  AIN, PIN, M, R, U motor intact    IMAGING:     Radiographs of the bilateral wrists and elbows were ordered and personally reviewed with the patient today.  They show bilateral elbow arthritis.    EMG 12/20/2019  This is an abnormal study. There is electrophysiologic evidence of:   1. Severe bilateral carpal tunnel syndrome with active and chronic denervation noted in bilateral APB muscles;   2. A right ulnar neuropathy at the elbow (cubital tunnel syndrome) with secondary active and chronic denervation in ulnar-innervated muscles in the right forearm and hand;   3. A left primarily axonal ulnar sensory neuropathy proximal to the take " off of the left dorsal ulnar cutaneous nerve. There were no abnormalities in the left ulnar motor evaluation that would suggest a cubital tunnel syndrome.       ASSESSMENT/PLAN:    Bj was seen today for pain and pain.    Diagnoses and all orders for this visit:    Carpal tunnel syndrome on both sides  -     Ambulatory referral/consult to Orthopedics    Ulnar neuropathy of right upper extremity  -     Ambulatory referral/consult to Orthopedics    Left lateral epicondylitis      - Discussed treatment options, currently left hand numbness is most symptomatic. Discussed treatment options including nonoperative and operative management.   - Patient would like to proceed with left carpal tunnel release. I have explained the risks, benefits, and alternatives of the procedure in detail.  The patient voices understanding and all questions have been answered.  The patient agrees to proceed as planned. Surgical consents signed in clinic.   - Counterforce brace for left lateral epicondylitis

## 2020-02-05 NOTE — PROGRESS NOTES
DATE: 2/4/2020  PATIENT: Bj Pate Jr.    CHIEF COMPLAINT: bilateral hand numbness, L>R    HISTORY:  Bj Pate Jr. is a 70 y.o. male here for initial evaluation of bilateral hand numbness, left > right. The pain has been present for many years. There is no history of trauma. The patient describes the pain as achy.  The pain is worse with activities and at time and improved by rest. There is associated numbness and tingling.  Prior treatments have included none.      PAST MEDICAL/SURGICAL HISTORY:  Past Medical History:   Diagnosis Date    Basal ganglia hemorrhage     Left basal ganglia hemorrhage with resultant right-sided hemiparesis which has resolved.     Benign hypertension with CKD (chronic kidney disease) stage III      Cataract     Chronic idiopathic gout of multiple sites     Chronic kidney disease, stage 3     Erectile dysfunction     Gout     Hemorrhoids without complication     Hyperlipidemia     Morbid obesity     Obstructive sleep apnea on CPAP     Stroke 2016, 2006    Thalamic infarct, acute (right) 1/2016    Type 2 DM with CKD stage 3 and hypertension     On pravastatin for cardiovascular protection.      Past Surgical History:   Procedure Laterality Date    EPIDURAL STEROID INJECTION N/A 5/2/2019    Procedure: Injection, Steroid, Epidural Cervical;  Surgeon: Dariel Doan Jr., MD;  Location: [facility] ENDO;  Service: Pain Management;  Laterality: N/A;  Cervical Epidural Steroid Injection     09986    Arrive @ 1130; ASA; Check BG    EPIDURAL STEROID INJECTION Bilateral 5/29/2019    Procedure: Lumbar Medial Branch Blocks;  Surgeon: Dariel Doan Jr., MD;  Location: [facility] ENDO;  Service: Pain Management;  Laterality: Bilateral;  Bilateral Lumbar Medial Branch Blocks L3, L4, L5    54571  [id number]    Attive @ 1030 (11 arrival request); NO Sedation; ASA; Check BG    EPIDURAL STEROID INJECTION Bilateral 7/3/2019    Procedure: Lumbar Medial Branch Blocks;  Surgeon: Dariel RODRÍGUEZ  Olimpia Gutierrez MD;  Location: [facility] ENDO;  Service: Pain Management;  Laterality: Bilateral;  Bilateral Lumbar Medial Branch Blocks L3, L4, L5    58988  [id number]    Arrive @ 0930; NO Sedation; ASA; Check BG    EPIDURAL STEROID INJECTION N/A 8/7/2019    Procedure: Injection, Steroid, Epidural Cervical;  Surgeon: Dariel Doan Jr., MD;  Location: [facility] ENDO;  Service: Pain Management;  Laterality: N/A;  Cervical Epidural Steroid Injection C7-T1    44989    Arrive @ 1115; Last ASA 7/30; Check BG    NO PAST SURGERIES         Current Medications:   Current Outpatient Medications:     ADVAIR DISKUS 250-50 mcg/dose diskus inhaler, Controller, Disp: 60 each, Rfl: 3    albuterol (PROAIR HFA) 90 mcg/actuation inhaler, Inhale 2 puffs into the lungs every 6 (six) hours as needed for Wheezing. Rescue, Disp: 1 each, Rfl: 11    aspirin (ECOTRIN) 81 MG EC tablet, Take 81 mg by mouth once daily., Disp: , Rfl:     atorvastatin (LIPITOR) 40 MG tablet, Take 1 tablet (40 mg total) by mouth once daily., Disp: 90 tablet, Rfl: 1    blood pressure test kit-large Kit, 1 Device by Misc.(Non-Drug; Combo Route) route 2 (two) times daily., Disp: 1 each, Rfl: 0    blood sugar diagnostic Strp, To check BG 2 times daily, to use with insurance preferred meter, Disp: 200 strip, Rfl: 3    blood-glucose meter kit, To check BG 2 times daily, to use with insurance preferred meter, Disp: 1 each, Rfl: 0    COLCRYS 0.6 mg tablet, TAKE 2 TABLETS BY MOUTH BY MOUTH AT  THE  FIRST  SIGN  OF  GOUT  THEN  1  TAB  1  HOUR  LATER, Disp: 30 tablet, Rfl: 2    diclofenac sodium (VOLTAREN) 1 % Gel, APPLY  2 GRAMS  TOPICALLY TO AFFECTED AREA ONCE DAILY, Disp: 300 g, Rfl: 0    diltiaZEM (CARTIA XT) 240 MG 24 hr capsule, Take 1 capsule (240 mg total) by mouth once daily., Disp: 90 capsule, Rfl: 1    FLUAD [id number], 65 YR UP,,PF, 45 mcg (15 mcg x 3)/0.5 mL Syrg, ADM 0.5ML IM UTD, Disp: , Rfl: 0    fluticasone (FLONASE) 50 mcg/actuation nasal spray, 1 spray  as needed. , Disp: , Rfl:     FLUZONE HIGH-DOSE 2017-18, PF, 180 mcg/0.5 mL vaccine, ADM 0.5ML IM UTD, Disp: , Rfl: 0    gabapentin (NEURONTIN) 600 MG tablet, Take 1 tablet (600 mg total) by mouth 3 (three) times daily., Disp: 90 tablet, Rfl: 11    lancets Misc, To check BG 2 times daily, to use with insurance preferred meter, Disp: 200 each, Rfl: 3    levocetirizine (XYZAL) 5 MG tablet, TAKE ONE TABLET BY MOUTH ONCE DAILY IN THE EVENING, Disp: 30 tablet, Rfl: 11    lisinopril-hydrochlorothiazide (PRINZIDE,ZESTORETIC) 20-25 mg Tab, Take 1 tablet by mouth once daily., Disp: 90 tablet, Rfl: 1    clobetasol 0.05% (TEMOVATE) 0.05 % Oint, Apply topically 2 (two) times daily., Disp: 45 g, Rfl: 5    Social History:   Social History     Socioeconomic History    Marital status:      Spouse name: Not on file    Number of children: 3    Years of education: Not on file    Highest education level: Not on file   Occupational History    Occupation:    Social Needs    Financial resource strain: Not on file    Food insecurity:     Worry: Not on file     Inability: Not on file    Transportation needs:     Medical: Not on file     Non-medical: Not on file   Tobacco Use    Smoking status: Never Smoker    Smokeless tobacco: Never Used   Substance and Sexual Activity    Alcohol use: Yes     Alcohol/week: 0.0 standard drinks     Comment: occacionally    Drug use: No    Sexual activity: Not on file   Lifestyle    Physical activity:     Days per week: Not on file     Minutes per session: Not on file    Stress: Not on file   Relationships    Social connections:     Talks on phone: Not on file     Gets together: Not on file     Attends Muslim service: Not on file     Active member of club or organization: Not on file     Attends meetings of clubs or organizations: Not on file     Relationship status: Not on file   Other Topics Concern    Not on file   Social History Narrative    Not on file  "      REVIEW OF SYSTEMS:  Constitution: Negative. Negative for chills, fever and night sweats.   Cardiovascular: Negative for chest pain and syncope.   Respiratory: Negative for cough and shortness of breath.   Gastrointestinal: See HPI. Negative for nausea/vomiting. Negative for abdominal pain.  Genitourinary: See HPI. Negative for discoloration or dysuria.  Skin: Negative for dry skin, itching and rash.   Hematologic/Lymphatic: Negative for bleeding problem. Does not bruise/bleed easily.   Musculoskeletal: Negative for falls and muscle weakness.   Neurological: See HPI. No seizures.   Endocrine: Negative for polydipsia, polyphagia and polyuria.   Allergic/Immunologic: Negative for hives and persistent infections.    PHYSICAL EXAMINATION:    BP (!) 151/81   Pulse 96   Ht 5' 9" (1.753 m)   Wt (!) 150.6 kg (332 lb)   BMI 49.03 kg/m²     General: The patient is a 70 y.o. male in no apparent distress, the patient is orientatied to person, place and time.   Psych: Normal mood and affect  HEENT:  NCAT  Lungs:  Respirations are equal and unlabored.  CV:  2+ bilateral upper and lower extremity pulses.  Skin:  Intact throughout.    MSK:  Positive Tinel and Phalen at bilateral wrists  Positive Tinel on right  Tenderness of left lateral epicondylitis, pain with resisted wrist dorsiflexion  AIN, PIN, M, R, U motor intact    IMAGING:     Radiographs of the bilateral wrists and elbows were ordered and personally reviewed with the patient today.  They show bilateral elbow arthritis.    EMG 12/20/2019  This is an abnormal study. There is electrophysiologic evidence of:   1. Severe bilateral carpal tunnel syndrome with active and chronic denervation noted in bilateral APB muscles;   2. A right ulnar neuropathy at the elbow (cubital tunnel syndrome) with secondary active and chronic denervation in ulnar-innervated muscles in the right forearm and hand;   3. A left primarily axonal ulnar sensory neuropathy proximal to the take " off of the left dorsal ulnar cutaneous nerve. There were no abnormalities in the left ulnar motor evaluation that would suggest a cubital tunnel syndrome.       ASSESSMENT/PLAN:    Bj was seen today for pain and pain.    Diagnoses and all orders for this visit:    Carpal tunnel syndrome on both sides  -     Ambulatory referral/consult to Orthopedics    Ulnar neuropathy of right upper extremity  -     Ambulatory referral/consult to Orthopedics    Left lateral epicondylitis      - Discussed treatment options, currently left hand numbness is most symptomatic. Discussed treatment options including nonoperative and operative management.   - Patient would like to proceed with left carpal tunnel release. I have explained the risks, benefits, and alternatives of the procedure in detail.  The patient voices understanding and all questions have been answered.  The patient agrees to proceed as planned. Surgical consents signed in clinic.   - Counterforce brace for left lateral epicondylitis

## 2020-02-05 NOTE — H&P
DATE: 2/4/2020  PATIENT: Bj Pate Jr.    CHIEF COMPLAINT: bilateral hand numbness, L>R    HISTORY:  Bj Pate Jr. is a 70 y.o. male here for initial evaluation of bilateral hand numbness, left > right. The pain has been present for many years. There is no history of trauma. The patient describes the pain as achy.  The pain is worse with activities and at time and improved by rest. There is associated numbness and tingling.  Prior treatments have included none.      PAST MEDICAL/SURGICAL HISTORY:  Past Medical History:   Diagnosis Date    Basal ganglia hemorrhage     Left basal ganglia hemorrhage with resultant right-sided hemiparesis which has resolved.     Benign hypertension with CKD (chronic kidney disease) stage III      Cataract     Chronic idiopathic gout of multiple sites     Chronic kidney disease, stage 3     Erectile dysfunction     Gout     Hemorrhoids without complication     Hyperlipidemia     Morbid obesity     Obstructive sleep apnea on CPAP     Stroke 2016, 2006    Thalamic infarct, acute (right) 1/2016    Type 2 DM with CKD stage 3 and hypertension     On pravastatin for cardiovascular protection.      Past Surgical History:   Procedure Laterality Date    EPIDURAL STEROID INJECTION N/A 5/2/2019    Procedure: Injection, Steroid, Epidural Cervical;  Surgeon: Dariel Doan Jr., MD;  Location: [facility] ENDO;  Service: Pain Management;  Laterality: N/A;  Cervical Epidural Steroid Injection     17006    Arrive @ 1130; ASA; Check BG    EPIDURAL STEROID INJECTION Bilateral 5/29/2019    Procedure: Lumbar Medial Branch Blocks;  Surgeon: Dariel Doan Jr., MD;  Location: [facility] ENDO;  Service: Pain Management;  Laterality: Bilateral;  Bilateral Lumbar Medial Branch Blocks L3, L4, L5    55842  44969    Attive @ 1030 (11 arrival request); NO Sedation; ASA; Check BG    EPIDURAL STEROID INJECTION Bilateral 7/3/2019    Procedure: Lumbar Medial Branch Blocks;  Surgeon: Dariel RODRÍGUEZ  Olimpia Gutierrez MD;  Location: [facility] ENDO;  Service: Pain Management;  Laterality: Bilateral;  Bilateral Lumbar Medial Branch Blocks L3, L4, L5    76285  25667    Arrive @ 0930; NO Sedation; ASA; Check BG    EPIDURAL STEROID INJECTION N/A 8/7/2019    Procedure: Injection, Steroid, Epidural Cervical;  Surgeon: Dariel Doan Jr., MD;  Location: [facility] ENDO;  Service: Pain Management;  Laterality: N/A;  Cervical Epidural Steroid Injection C7-T1    [id number]    Arrive @ 1115; Last ASA 7/30; Check BG    NO PAST SURGERIES         Current Medications:   Current Outpatient Medications:     ADVAIR DISKUS 250-50 mcg/dose diskus inhaler, Controller, Disp: 60 each, Rfl: 3    albuterol (PROAIR HFA) 90 mcg/actuation inhaler, Inhale 2 puffs into the lungs every 6 (six) hours as needed for Wheezing. Rescue, Disp: 1 each, Rfl: 11    aspirin (ECOTRIN) 81 MG EC tablet, Take 81 mg by mouth once daily., Disp: , Rfl:     atorvastatin (LIPITOR) 40 MG tablet, Take 1 tablet (40 mg total) by mouth once daily., Disp: 90 tablet, Rfl: 1    blood pressure test kit-large Kit, 1 Device by Misc.(Non-Drug; Combo Route) route 2 (two) times daily., Disp: 1 each, Rfl: 0    blood sugar diagnostic Strp, To check BG 2 times daily, to use with insurance preferred meter, Disp: 200 strip, Rfl: 3    blood-glucose meter kit, To check BG 2 times daily, to use with insurance preferred meter, Disp: 1 each, Rfl: 0    COLCRYS 0.6 mg tablet, TAKE 2 TABLETS BY MOUTH BY MOUTH AT  THE  FIRST  SIGN  OF  GOUT  THEN  1  TAB  1  HOUR  LATER, Disp: 30 tablet, Rfl: 2    diclofenac sodium (VOLTAREN) 1 % Gel, APPLY  2 GRAMS  TOPICALLY TO AFFECTED AREA ONCE DAILY, Disp: 300 g, Rfl: 0    diltiaZEM (CARTIA XT) 240 MG 24 hr capsule, Take 1 capsule (240 mg total) by mouth once daily., Disp: 90 capsule, Rfl: 1    FLUAD [id number], 65 YR UP,,PF, 45 mcg (15 mcg x 3)/0.5 mL Syrg, ADM 0.5ML IM UTD, Disp: , Rfl: 0    fluticasone (FLONASE) 50 mcg/actuation nasal spray, 1 spray  as needed. , Disp: , Rfl:     FLUZONE HIGH-DOSE 2017-18, PF, 180 mcg/0.5 mL vaccine, ADM 0.5ML IM UTD, Disp: , Rfl: 0    gabapentin (NEURONTIN) 600 MG tablet, Take 1 tablet (600 mg total) by mouth 3 (three) times daily., Disp: 90 tablet, Rfl: 11    lancets Misc, To check BG 2 times daily, to use with insurance preferred meter, Disp: 200 each, Rfl: 3    levocetirizine (XYZAL) 5 MG tablet, TAKE ONE TABLET BY MOUTH ONCE DAILY IN THE EVENING, Disp: 30 tablet, Rfl: 11    lisinopril-hydrochlorothiazide (PRINZIDE,ZESTORETIC) 20-25 mg Tab, Take 1 tablet by mouth once daily., Disp: 90 tablet, Rfl: 1    clobetasol 0.05% (TEMOVATE) 0.05 % Oint, Apply topically 2 (two) times daily., Disp: 45 g, Rfl: 5    Social History:   Social History     Socioeconomic History    Marital status:      Spouse name: Not on file    Number of children: 3    Years of education: Not on file    Highest education level: Not on file   Occupational History    Occupation:    Social Needs    Financial resource strain: Not on file    Food insecurity:     Worry: Not on file     Inability: Not on file    Transportation needs:     Medical: Not on file     Non-medical: Not on file   Tobacco Use    Smoking status: Never Smoker    Smokeless tobacco: Never Used   Substance and Sexual Activity    Alcohol use: Yes     Alcohol/week: 0.0 standard drinks     Comment: occacionally    Drug use: No    Sexual activity: Not on file   Lifestyle    Physical activity:     Days per week: Not on file     Minutes per session: Not on file    Stress: Not on file   Relationships    Social connections:     Talks on phone: Not on file     Gets together: Not on file     Attends Voodoo service: Not on file     Active member of club or organization: Not on file     Attends meetings of clubs or organizations: Not on file     Relationship status: Not on file   Other Topics Concern    Not on file   Social History Narrative    Not on file  "      REVIEW OF SYSTEMS:  Constitution: Negative. Negative for chills, fever and night sweats.   Cardiovascular: Negative for chest pain and syncope.   Respiratory: Negative for cough and shortness of breath.   Gastrointestinal: See HPI. Negative for nausea/vomiting. Negative for abdominal pain.  Genitourinary: See HPI. Negative for discoloration or dysuria.  Skin: Negative for dry skin, itching and rash.   Hematologic/Lymphatic: Negative for bleeding problem. Does not bruise/bleed easily.   Musculoskeletal: Negative for falls and muscle weakness.   Neurological: See HPI. No seizures.   Endocrine: Negative for polydipsia, polyphagia and polyuria.   Allergic/Immunologic: Negative for hives and persistent infections.    PHYSICAL EXAMINATION:    BP (!) 151/81   Pulse 96   Ht 5' 9" (1.753 m)   Wt (!) 150.6 kg (332 lb)   BMI 49.03 kg/m²     General: The patient is a 70 y.o. male in no apparent distress, the patient is orientatied to person, place and time.   Psych: Normal mood and affect  HEENT:  NCAT  Lungs:  Respirations are equal and unlabored.  CV:  2+ bilateral upper and lower extremity pulses.  Skin:  Intact throughout.    MSK:  Positive Tinel and Phalen at bilateral wrists  Positive Tinel on right  Tenderness of left lateral epicondylitis, pain with resisted wrist dorsiflexion  AIN, PIN, M, R, U motor intact    IMAGING:     Radiographs of the bilateral wrists and elbows were ordered and personally reviewed with the patient today.  They show bilateral elbow arthritis.    EMG 12/20/2019  This is an abnormal study. There is electrophysiologic evidence of:   1. Severe bilateral carpal tunnel syndrome with active and chronic denervation noted in bilateral APB muscles;   2. A right ulnar neuropathy at the elbow (cubital tunnel syndrome) with secondary active and chronic denervation in ulnar-innervated muscles in the right forearm and hand;   3. A left primarily axonal ulnar sensory neuropathy proximal to the take " off of the left dorsal ulnar cutaneous nerve. There were no abnormalities in the left ulnar motor evaluation that would suggest a cubital tunnel syndrome.       ASSESSMENT/PLAN:    Bj was seen today for pain and pain.    Diagnoses and all orders for this visit:    Carpal tunnel syndrome on both sides  -     Ambulatory referral/consult to Orthopedics    Ulnar neuropathy of right upper extremity  -     Ambulatory referral/consult to Orthopedics    Left lateral epicondylitis      - Discussed treatment options, currently left hand numbness is most symptomatic. Discussed treatment options including nonoperative and operative management.   - Patient would like to proceed with left carpal tunnel release. I have explained the risks, benefits, and alternatives of the procedure in detail.  The patient voices understanding and all questions have been answered.  The patient agrees to proceed as planned. Surgical consents signed in clinic.   - Counterforce brace for left lateral epicondylitis

## 2020-02-06 ENCOUNTER — TELEPHONE (OUTPATIENT)
Dept: FAMILY MEDICINE | Facility: CLINIC | Age: 70
End: 2020-02-06

## 2020-02-06 ENCOUNTER — PATIENT OUTREACH (OUTPATIENT)
Dept: ADMINISTRATIVE | Facility: HOSPITAL | Age: 70
End: 2020-02-06

## 2020-02-06 NOTE — TELEPHONE ENCOUNTER
----- Message from Chari Plaza sent at 2/6/2020 11:47 AM CST -----  Contact: Patient [phone]  Type:  Patient Returning Call    Who Called:  Patient    Who Left Message for Patient: Not sure    Does the patient know what this is regarding?: no    Would the patient rather a call back or a response via [facility]? Call back    Best Call Back Number: [phone]

## 2020-02-14 DIAGNOSIS — G56.02 LEFT CARPAL TUNNEL SYNDROME: ICD-10-CM

## 2020-02-14 DIAGNOSIS — G56.02 LEFT CARPAL TUNNEL SYNDROME: Primary | ICD-10-CM

## 2020-02-14 RX ORDER — ACETAMINOPHEN AND CODEINE PHOSPHATE 300; 30 MG/1; MG/1
1 TABLET ORAL EVERY 6 HOURS PRN
Qty: 25 TABLET | Refills: 0 | Status: ON HOLD | OUTPATIENT
Start: 2020-02-14 | End: 2020-03-02 | Stop reason: HOSPADM

## 2020-02-14 RX ORDER — ACETAMINOPHEN AND CODEINE PHOSPHATE 300; 30 MG/1; MG/1
1 TABLET ORAL EVERY 6 HOURS PRN
Qty: 25 TABLET | Refills: 0 | Status: SHIPPED | OUTPATIENT
Start: 2020-02-14 | End: 2020-02-14

## 2020-02-18 ENCOUNTER — HOSPITAL ENCOUNTER (OUTPATIENT)
Dept: PREADMISSION TESTING | Facility: OTHER | Age: 70
Discharge: HOME | End: 2020-02-18
Attending: ORTHOPAEDIC SURGERY
Payer: MEDICARE

## 2020-02-18 ENCOUNTER — ANESTHESIA EVENT (OUTPATIENT)
Dept: SURGERY | Facility: OTHER | Age: 70
End: 2020-02-18
Payer: MEDICARE

## 2020-02-18 ENCOUNTER — TELEPHONE (OUTPATIENT)
Dept: ORTHOPEDICS | Facility: CLINIC | Age: 70
End: 2020-02-18

## 2020-02-18 VITALS
SYSTOLIC BLOOD PRESSURE: 137 MMHG | DIASTOLIC BLOOD PRESSURE: 71 MMHG | HEIGHT: 69 IN | TEMPERATURE: 98 F | WEIGHT: 315 LBS | BODY MASS INDEX: 46.65 KG/M2 | OXYGEN SATURATION: 96 % | HEART RATE: 81 BPM

## 2020-02-18 RX ORDER — ALBUTEROL SULFATE 0.83 MG/ML
2.5 SOLUTION RESPIRATORY (INHALATION)
Status: CANCELLED | OUTPATIENT
Start: 2020-02-18 | End: 2020-02-18

## 2020-02-18 RX ORDER — SODIUM CHLORIDE, SODIUM LACTATE, POTASSIUM CHLORIDE, CALCIUM CHLORIDE 600; 310; 30; 20 MG/100ML; MG/100ML; MG/100ML; MG/100ML
INJECTION, SOLUTION INTRAVENOUS CONTINUOUS
Status: CANCELLED | OUTPATIENT
Start: 2020-02-18

## 2020-02-18 RX ORDER — DEXTROMETHORPHAN HYDROBROMIDE, GUAIFENESIN 5; 100 MG/5ML; MG/5ML
650 LIQUID ORAL EVERY 8 HOURS
Status: ON HOLD | COMMUNITY
End: 2020-02-19 | Stop reason: HOSPADM

## 2020-02-18 RX ORDER — ASCORBIC ACID 500 MG
500 TABLET ORAL DAILY
Status: ON HOLD | COMMUNITY
End: 2023-12-18 | Stop reason: CLARIF

## 2020-02-18 RX ORDER — FAMOTIDINE 20 MG/1
20 TABLET, FILM COATED ORAL
Status: CANCELLED | OUTPATIENT
Start: 2020-02-18 | End: 2020-02-18

## 2020-02-18 RX ORDER — ACETAMINOPHEN 500 MG
1000 TABLET ORAL
Status: CANCELLED | OUTPATIENT
Start: 2020-02-18 | End: 2020-02-18

## 2020-02-18 RX ORDER — CHOLECALCIFEROL (VITAMIN D3) 25 MCG
1000 TABLET ORAL DAILY
COMMUNITY
End: 2023-12-11

## 2020-02-18 NOTE — DISCHARGE INSTRUCTIONS
Information to Prepare you for your Surgery    PRE-ADMIT TESTING -  [phone]    [address]  [place] [facility]          Your surgery has been scheduled at [facility]. We are pleased to have the opportunity to serve you. For Further Information please call [phone].    On the day of surgery please report to the Information Desk on the 1st floor.    · CONTACT YOUR PHYSICIAN'S OFFICE THE DAY PRIOR TO YOUR SURGERY TO OBTAIN YOUR ARRIVAL TIME.     · The evening before surgery do not eat anything after 9 p.m. ( this includes hard candy, chewing gum and mints).  You may only have GATORADE, POWERADE AND WATER  from 9 p.m. until you leave your home.   DO NOT DRINK ANY LIQUIDS ON THE WAY TO THE HOSPITAL.      SPECIAL MEDICATION INSTRUCTIONS: TAKE medications checked off by the Anesthesiologist on your Medication List.    Angiogram Patients: Take medications as instructed by your physician, including aspirin.     Surgery Patients:    If you take ASPIRIN - Your PHYSICIAN/SURGEON will need to inform you IF/OR when you need to stop taking aspirin prior to your surgery.     Do Not take any medications containing IBUPROFEN.  Do Not Wear any make-up or dark nail polish   (especially eye make-up) to surgery. If you come to surgery with makeup on you will be required to remove the makeup or nail polish.    Do not shave your surgical area at least 5 days prior to your surgery. The surgical prep will be performed at the hospital according to Infection Control regulations.    Leave all valuables at home.   Do Not wear any jewelry or watches, including any metal in body piercings. Jewelry must be removed prior to coming to the hospital.  There is a possibility that rings that are unable to be removed may be cut off if they are on the surgical extremity.    Contact Lens must be removed before surgery. Either do not wear the contact lens or bring a case and solution for  storage.  Please bring a container for eyeglasses or dentures as required.  Bring any paperwork your physician has provided, such as consent forms,  history and physicals, doctor's orders, etc.   Bring comfortable clothes that are loose fitting to wear upon discharge. Take into consideration the type of surgery being performed.  Maintain your diet as advised per your physician the day prior to surgery.      Adequate rest the night before surgery is advised.   Park in the Parking lot behind the hospital or in the [facility] across the street from the parking lot. Parking is complimentary.  If you will be discharged the same day as your procedure, please arrange for a responsible adult to drive you home or to accompany you if traveling by taxi.   YOU WILL NOT BE PERMITTED TO DRIVE OR TO LEAVE THE HOSPITAL ALONE AFTER SURGERY.   It is strongly recommended that you arrange for someone to remain with you for the first 24 hrs following your surgery.    The Surgeon will speak to your family/visitor after your surgery regarding the outcome of your surgery and post op care.  The Surgeon may speak to you after your surgery, but there is a possibility you may not remember the details.  Please check with your family members regarding the conversation with the Surgeon.    We strongly recommend whoever is bringing you home be present for discharge instructions.  This will ensure a thorough understanding for your post op home care.    EACH PATIENT IS ALLOWED TWO FAMILY MEMBERS OR VISITORS IN THE ROOM AND IN THE WAITING ROOMS WHILE YOU ARE IN SURGERY. ALL CHILDREN MUST ALWAYS BE ACCOMPANIED BY AN ADULT.    Thank you for your cooperation.  The Staff of [facility].                Bathing Instructions with Hibiclens     Shower the evening before and morning of your procedure with Hibiclens:   Wash your face with water and your regular face wash/soap   Apply Hibiclens directly on your skin or on a  wet washcloth and wash gently. When showering: Move away from the shower stream when applying Hibiclens to avoid rinsing off too soon.   Rinse thoroughly with warm water   Do not dilute Hibiclens         Dry off as usual, do not use any deodorant, powder, body lotions, perfume, after shave or cologne.
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
## 2020-02-18 NOTE — TELEPHONE ENCOUNTER
Informed patient to be at the surgery center 2/19/20 for 6:30 a.m. Also reminded patient of post op appointment on 3/5/20.Patient verbalized understanding.

## 2020-02-19 ENCOUNTER — HOSPITAL ENCOUNTER (OUTPATIENT)
Facility: OTHER | Age: 70
Discharge: HOME | End: 2020-02-19
Attending: ORTHOPAEDIC SURGERY | Admitting: ORTHOPAEDIC SURGERY
Payer: MEDICARE

## 2020-02-19 ENCOUNTER — ANESTHESIA (OUTPATIENT)
Dept: SURGERY | Facility: OTHER | Age: 70
End: 2020-02-19
Payer: MEDICARE

## 2020-02-19 VITALS
WEIGHT: 315 LBS | SYSTOLIC BLOOD PRESSURE: 150 MMHG | OXYGEN SATURATION: 99 % | HEIGHT: 69 IN | HEART RATE: 72 BPM | BODY MASS INDEX: 46.65 KG/M2 | RESPIRATION RATE: 18 BRPM | DIASTOLIC BLOOD PRESSURE: 80 MMHG | TEMPERATURE: 98 F

## 2020-02-19 DIAGNOSIS — G56.02 LEFT CARPAL TUNNEL SYNDROME: Primary | ICD-10-CM

## 2020-02-19 LAB — POCT GLUCOSE: 100 MG/DL (ref 70–110)

## 2020-02-19 PROCEDURE — 25000003 PHARM REV CODE 250: Performed by: ANESTHESIOLOGY

## 2020-02-19 PROCEDURE — 36000707: Performed by: ORTHOPAEDIC SURGERY

## 2020-02-19 PROCEDURE — 64721 PR REVISE MEDIAN N/CARPAL TUNNEL SURG: ICD-10-PCS | Mod: LT,,, | Performed by: ORTHOPAEDIC SURGERY

## 2020-02-19 PROCEDURE — 25000242 PHARM REV CODE 250 ALT 637 W/ HCPCS: Performed by: ANESTHESIOLOGY

## 2020-02-19 PROCEDURE — 37000008 HC ANESTHESIA 1ST 15 MINUTES: Performed by: ORTHOPAEDIC SURGERY

## 2020-02-19 PROCEDURE — 25000003 PHARM REV CODE 250: Performed by: ORTHOPAEDIC SURGERY

## 2020-02-19 PROCEDURE — 63600175 PHARM REV CODE 636 W HCPCS: Performed by: ANESTHESIOLOGY

## 2020-02-19 PROCEDURE — 64721 CARPAL TUNNEL SURGERY: CPT | Mod: LT,,, | Performed by: ORTHOPAEDIC SURGERY

## 2020-02-19 PROCEDURE — 71000015 HC POSTOP RECOV 1ST HR: Performed by: ORTHOPAEDIC SURGERY

## 2020-02-19 PROCEDURE — 94640 AIRWAY INHALATION TREATMENT: CPT

## 2020-02-19 PROCEDURE — 63600175 PHARM REV CODE 636 W HCPCS: Performed by: STUDENT IN AN ORGANIZED HEALTH CARE EDUCATION/TRAINING PROGRAM

## 2020-02-19 PROCEDURE — 37000009 HC ANESTHESIA EA ADD 15 MINS: Performed by: ORTHOPAEDIC SURGERY

## 2020-02-19 PROCEDURE — 36000706: Performed by: ORTHOPAEDIC SURGERY

## 2020-02-19 PROCEDURE — 63600175 PHARM REV CODE 636 W HCPCS: Performed by: NURSE ANESTHETIST, CERTIFIED REGISTERED

## 2020-02-19 PROCEDURE — 82962 GLUCOSE BLOOD TEST: CPT | Performed by: ORTHOPAEDIC SURGERY

## 2020-02-19 RX ORDER — HYDROMORPHONE HYDROCHLORIDE 2 MG/ML
0.2 INJECTION, SOLUTION INTRAMUSCULAR; INTRAVENOUS; SUBCUTANEOUS EVERY 5 MIN PRN
Status: DISCONTINUED | OUTPATIENT
Start: 2020-02-19 | End: 2020-02-19 | Stop reason: HOSPADM

## 2020-02-19 RX ORDER — LIDOCAINE HYDROCHLORIDE 10 MG/ML
INJECTION INFILTRATION; PERINEURAL
Status: DISCONTINUED | OUTPATIENT
Start: 2020-02-19 | End: 2020-02-19 | Stop reason: HOSPADM

## 2020-02-19 RX ORDER — MIDAZOLAM HYDROCHLORIDE 1 MG/ML
INJECTION INTRAMUSCULAR; INTRAVENOUS
Status: DISCONTINUED | OUTPATIENT
Start: 2020-02-19 | End: 2020-02-19

## 2020-02-19 RX ORDER — LIDOCAINE HYDROCHLORIDE 20 MG/ML
INJECTION INTRAVENOUS
Status: DISCONTINUED | OUTPATIENT
Start: 2020-02-19 | End: 2020-02-19

## 2020-02-19 RX ORDER — SODIUM CHLORIDE 0.9 % (FLUSH) 0.9 %
3 SYRINGE (ML) INJECTION
Status: DISCONTINUED | OUTPATIENT
Start: 2020-02-19 | End: 2020-02-19 | Stop reason: HOSPADM

## 2020-02-19 RX ORDER — BACITRACIN ZINC 500 UNIT/G
OINTMENT (GRAM) TOPICAL
Status: DISCONTINUED | OUTPATIENT
Start: 2020-02-19 | End: 2020-02-19 | Stop reason: HOSPADM

## 2020-02-19 RX ORDER — ONDANSETRON 2 MG/ML
INJECTION INTRAMUSCULAR; INTRAVENOUS
Status: DISCONTINUED | OUTPATIENT
Start: 2020-02-19 | End: 2020-02-19

## 2020-02-19 RX ORDER — ACETAMINOPHEN 500 MG
1000 TABLET ORAL
Status: COMPLETED | OUTPATIENT
Start: 2020-02-19 | End: 2020-02-19

## 2020-02-19 RX ORDER — ALBUTEROL SULFATE 0.83 MG/ML
2.5 SOLUTION RESPIRATORY (INHALATION)
Status: COMPLETED | OUTPATIENT
Start: 2020-02-19 | End: 2020-02-19

## 2020-02-19 RX ORDER — BUPIVACAINE HYDROCHLORIDE 2.5 MG/ML
INJECTION, SOLUTION EPIDURAL; INFILTRATION; INTRACAUDAL
Status: DISCONTINUED | OUTPATIENT
Start: 2020-02-19 | End: 2020-02-19 | Stop reason: HOSPADM

## 2020-02-19 RX ORDER — CEFAZOLIN SODIUM 1 G/3ML
2 INJECTION, POWDER, FOR SOLUTION INTRAMUSCULAR; INTRAVENOUS
Status: COMPLETED | OUTPATIENT
Start: 2020-02-19 | End: 2020-02-19

## 2020-02-19 RX ORDER — PROPOFOL 10 MG/ML
VIAL (ML) INTRAVENOUS
Status: DISCONTINUED | OUTPATIENT
Start: 2020-02-19 | End: 2020-02-19

## 2020-02-19 RX ORDER — SODIUM CHLORIDE, SODIUM LACTATE, POTASSIUM CHLORIDE, CALCIUM CHLORIDE 600; 310; 30; 20 MG/100ML; MG/100ML; MG/100ML; MG/100ML
INJECTION, SOLUTION INTRAVENOUS CONTINUOUS
Status: DISCONTINUED | OUTPATIENT
Start: 2020-02-19 | End: 2020-02-19 | Stop reason: HOSPADM

## 2020-02-19 RX ORDER — SODIUM CHLORIDE 9 MG/ML
INJECTION, SOLUTION INTRAVENOUS CONTINUOUS
Status: DISCONTINUED | OUTPATIENT
Start: 2020-02-19 | End: 2021-11-12

## 2020-02-19 RX ORDER — OXYCODONE HYDROCHLORIDE 5 MG/1
5 TABLET ORAL
Status: DISCONTINUED | OUTPATIENT
Start: 2020-02-19 | End: 2020-02-19 | Stop reason: HOSPADM

## 2020-02-19 RX ORDER — MUPIROCIN 20 MG/G
OINTMENT TOPICAL
Status: DISCONTINUED | OUTPATIENT
Start: 2020-02-19 | End: 2020-02-19

## 2020-02-19 RX ORDER — FAMOTIDINE 20 MG/1
20 TABLET, FILM COATED ORAL
Status: COMPLETED | OUTPATIENT
Start: 2020-02-19 | End: 2020-02-19

## 2020-02-19 RX ADMIN — ONDANSETRON 4 MG: 2 INJECTION INTRAMUSCULAR; INTRAVENOUS at 09:02

## 2020-02-19 RX ADMIN — FAMOTIDINE 20 MG: 20 TABLET ORAL at 06:02

## 2020-02-19 RX ADMIN — ACETAMINOPHEN 1000 MG: 500 TABLET, FILM COATED ORAL at 06:02

## 2020-02-19 RX ADMIN — LIDOCAINE HYDROCHLORIDE 75 MG: 20 INJECTION, SOLUTION INTRAVENOUS at 09:02

## 2020-02-19 RX ADMIN — MIDAZOLAM HYDROCHLORIDE 2 MG: 1 INJECTION, SOLUTION INTRAMUSCULAR; INTRAVENOUS at 09:02

## 2020-02-19 RX ADMIN — ALBUTEROL SULFATE 2.5 MG: 2.5 SOLUTION RESPIRATORY (INHALATION) at 07:02

## 2020-02-19 RX ADMIN — PROPOFOL 20 MG: 10 INJECTION, EMULSION INTRAVENOUS at 09:02

## 2020-02-19 RX ADMIN — SODIUM CHLORIDE, SODIUM LACTATE, POTASSIUM CHLORIDE, AND CALCIUM CHLORIDE: 600; 310; 30; 20 INJECTION, SOLUTION INTRAVENOUS at 08:02

## 2020-02-19 RX ADMIN — CEFAZOLIN 2 G: 330 INJECTION, POWDER, FOR SOLUTION INTRAMUSCULAR; INTRAVENOUS at 09:02

## 2020-02-19 NOTE — PLAN OF CARE
Bj Pate Jr. has met all discharge criteria from Phase II. Vital Signs are stable, ambulating  without difficulty. Discharge instructions given, patient verbalized understanding. Discharged from facility via wheelchair in stable condition.

## 2020-02-19 NOTE — INTERVAL H&P NOTE
The patient has been examined and the H&P has been reviewed:    I concur with the findings and no changes have occurred since H&P was written.    Anesthesia/Surgery risks, benefits and alternative options discussed and understood by patient/family.          Active Hospital Problems    Diagnosis  POA    *Left carpal tunnel syndrome [G56.02]  Yes      Resolved Hospital Problems   No resolved problems to display.

## 2020-02-19 NOTE — OP NOTE
[facility]  Brief Operative Note    Surgery Date: 2/19/2020     Surgeon(s) and Role:     * Maria Luisa Mccurdy MD - Primary    Assisting Surgeon: Mariano Boyd    Pre-op Diagnosis:  Carpal tunnel syndrome, left    Post-op Diagnosis:  Post-Op Diagnosis Codes:     * Carpal tunnel syndrome, unspecified laterality [G56.00]    Procedure(s) (LRB):  RELEASE, CARPAL TUNNEL LEFT (Left)    Anesthesia: Local MAC    Indications for Procedure: Bj Pate Jr.  is a 70 y.o. male with a longstanding history of symptoms consistant with carpal tunnel syndrome and elected to have this released. Risks and complications were discussed including, but not limited to risks of anesthetic complications, infections, wound healing complications and in particular, the most considerable risk of continued numbness. Informed consent was obtained.    Procedure in Detail:  The patient was taken to the Operating Room where IV sedation was administered by the Anesthesia Department. Time out was performed confirming patient, site, and surgery. Alcohol was used to clean skin. The skin and subcutaneous tissues were infiltrated with 1% lidocaine without epinephrine over the proposed incision site. Tourniquet was applied to forearm. The left upper extremity was then sterilely prepped in the normal fashion.     Under tourniquet control, a 4 cm longitudinal incision was made in the palm of the operative hand extending from the distal wrist crease into the palm of the hand along the ulnar border of the fourth ray.  Subcutaneous tissue was sharply dissected down to the palmar aponeurosis, which was incised. The transverse carpal ligament was identified and sharply incised. The transverse carpal ligament was found to be thick. A small flap of the transverse carpal ligament was excised to prevent scar formation and stenosis of the ligament. The antebrachial fascia was also released.  Contents of carpal canal were unremarkable and no  further stenotic areas on the nerve were noted after digital palpation.    The wound was irrigated. Skin was then closed with interrupted horizontal mattress sutures 4-0 nylon. Incision site was infiltrated with 0.25% bupivacaine. Sterile dressing was applied with bacitracin, adaptic, 4x4, kerlex, and ACE bandage. Tourniquet was deflated. Patient was returned to the Postanesthesia Care Unit in stable condition.      Estimated Blood Loss: * No values recorded between 2/19/2020  9:13 AM and 2/19/2020  9:30 AM *         Specimens:   Specimen (12h ago, onward)    None            Discharge Note    OUTCOME: Patient tolerated treatment/procedure well without complication and is now ready for discharge.    DISPOSITION: Home or Self Care    FINAL DIAGNOSIS:  Left carpal tunnel syndrome    FOLLOWUP: In clinic    DISCHARGE INSTRUCTIONS:    Discharge Procedure Orders   Call MD for:  temperature >100.4     Call MD for:  persistent nausea and vomiting or diarrhea     Call MD for:  redness, tenderness, or signs of infection (pain, swelling, redness, odor or green/yellow discharge around incision site)     Call MD for:  difficulty breathing or increased cough     Call MD for:  severe persistent headache     Call MD for:  worsening rash     Call MD for:  persistent dizziness, light-headedness, or visual disturbances     Call MD for:  increased confusion or weakness     Leave dressing on - Keep it clean, dry, and intact until clinic visit     Weight bearing restrictions (specify):   Order Comments: No lifting greater than 5 pounds.        Clinical Reference Documents Added to Patient Instructions       Document    CARPAL TUNNEL RELEASE, DISCHARGE INSTRUCTIONS FOR (ENGLISH)

## 2020-02-19 NOTE — BRIEF OP NOTE
[facility]  Brief Operative Note    Surgery Date: 2/19/2020     Surgeon(s) and Role:     * Maria Luisa Mccurdy MD - Primary    Assisting Surgeon: Mariano Boyd MD    Pre-op Diagnosis:  Carpal tunnel syndrome, unspecified laterality [G56.00]    Post-op Diagnosis:  Post-Op Diagnosis Codes:     * Carpal tunnel syndrome, unspecified laterality [G56.00]    Procedure(s) (LRB):  RELEASE, CARPAL TUNNEL LEFT (Left)    Anesthesia: Local MAC    Description of the findings of the procedure(s): left carpal tunnel release    Estimated Blood Loss: * No values recorded between 2/19/2020 12:00 AM and 2/19/2020  8:54 AM *         Specimens:   Specimen (12h ago, onward)    None            Discharge Note    OUTCOME: Patient tolerated treatment/procedure well without complication and is now ready for discharge.    DISPOSITION: Home or Self Care    FINAL DIAGNOSIS:  Left carpal tunnel syndrome    FOLLOWUP: In clinic    DISCHARGE INSTRUCTIONS:  No discharge procedures on file.

## 2020-02-19 NOTE — DISCHARGE INSTRUCTIONS
Discharge Instructions for Carpal Tunnel Release  You had a carpal tunnel release procedure to help relieve the symptoms of carpal tunnel syndrome. In carpal tunnel syndrome, a nerve in the wrist is compressed and irritated. This causes numbness and pain in the fingers and hand. Carpal tunnel release relieves the compression of the nerve. Here are instructions that will help you care for your arm and wrist when you are at home.  Home care  · Avoid gripping objects tightly or lifting with your affected arm.  · Wear your bandage, splint, or cast as directed by your doctor.  · Always keep the dressing, splint, or cast dry and clean.  · When showering, cover your hand and  wrist with plastic and use tape or rubberbands to keep the dressing, splint, or cast dry. Shower as necessary.    · Use an ice pack or bag of frozen peas -- or something similar -- wrapped in a thin towel on your wrist to reduce swelling for the first 48 hours. Leave the ice pack on for 20 minutes; then take it off for 20 minutes. Repeat as needed.  · Keep your arm elevated above your heart for 24 to 48 hours after surgery.  · Do the exercises you learned in the hospital, or as instructed by your doctor.  · Take pain medicine as directed.  · Dont drive until your doctor says its OK. Never drive while you are taking opioid pain medicine.  · Ask your doctor when you can return to work. If your job requires heavy lifting, you may not be able to begin working again for several weeks.  Follow-up care  Make a follow-up appointment as directed by your doctor.     When to seek medical care  Call 911 right away if you have any of the following:  · Chest pain  · Shortness of breath  Otherwise, call your doctor immediately if you have any of the following:  · A splint, cast, or dressing that has gotten wet  · Increased bleeding or drainage from the incision (cut)  · Opening of the incision  · Fever above 100.4°F (38.9°C) taken by mouth, or shaking  chills  · Any new numbness in the fingers or thumb  · Blue hand or fingers  · Increased pain with or without activity  · Increased redness, tenderness, or swelling of the incision   Date Last Reviewed: 11/15/2015  © 3558-3256 [employer] [address] [place] [address]. All rights reserved. This information is not intended as a substitute for professional medical care. Always follow your healthcare professional's instructions.      PLEASE FOLLOW ANY ADDITIONAL INSTRUCTIONS GIVEN TO YOU BY DR HAQUE!

## 2020-02-19 NOTE — ANESTHESIA POSTPROCEDURE EVALUATION
Anesthesia Post Evaluation    Patient: Bj Pate Jr.    Procedure(s) Performed: Procedure(s) (LRB):  RELEASE, CARPAL TUNNEL LEFT (Left)    Final Anesthesia Type: MAC    Patient location during evaluation: [facility]  Patient participation: Yes- Able to Participate  Level of consciousness: awake and alert  Post-procedure vital signs: reviewed and stable  Pain management: adequate  Airway patency: patent    PONV status at discharge: No PONV  Anesthetic complications: no      Cardiovascular status: blood pressure returned to baseline  Respiratory status: unassisted  Hydration status: euvolemic  Follow-up not needed.          Vitals Value Taken Time   /72 2/19/2020  7:08 AM   Temp 36.9 °C (98.4 °F) 2/19/2020  7:08 AM   Pulse 70 2/19/2020  7:08 AM   Resp 18 2/19/2020  7:08 AM   SpO2 99 % 2/19/2020  7:08 AM         No case tracking events are documented in the log.      Pain/Matthew Score: Pain Rating Prior to Med Admin: 5 (2/19/2020  6:57 AM)

## 2020-02-22 ENCOUNTER — NURSE TRIAGE (OUTPATIENT)
Dept: ADMINISTRATIVE | Facility: CLINIC | Age: 70
End: 2020-02-22

## 2020-02-22 ENCOUNTER — HOSPITAL ENCOUNTER (EMERGENCY)
Facility: HOSPITAL | Age: 70
Discharge: HOME | End: 2020-02-22
Attending: EMERGENCY MEDICINE
Payer: MEDICARE

## 2020-02-22 VITALS
HEIGHT: 69 IN | SYSTOLIC BLOOD PRESSURE: 152 MMHG | OXYGEN SATURATION: 97 % | RESPIRATION RATE: 16 BRPM | DIASTOLIC BLOOD PRESSURE: 84 MMHG | BODY MASS INDEX: 33.77 KG/M2 | HEART RATE: 86 BPM | TEMPERATURE: 98 F | WEIGHT: 228 LBS

## 2020-02-22 DIAGNOSIS — M25.532 LEFT WRIST PAIN: ICD-10-CM

## 2020-02-22 DIAGNOSIS — G89.18 POST-OP PAIN: Primary | ICD-10-CM

## 2020-02-22 LAB
ALBUMIN SERPL-MCNC: 3.8 G/DL (ref 3.3–5.5)
ALP SERPL-CCNC: 71 U/L (ref 42–141)
BILIRUB SERPL-MCNC: 1 MG/DL (ref 0.2–1.6)
BUN SERPL-MCNC: 21 MG/DL (ref 7–22)
CALCIUM SERPL-MCNC: 9.9 MG/DL (ref 8–10.3)
CHLORIDE SERPL-SCNC: 104 MMOL/L (ref 98–108)
CREAT SERPL-MCNC: 1.5 MG/DL (ref 0.6–1.2)
GLUCOSE SERPL-MCNC: 113 MG/DL (ref 73–118)
POC ALT (SGPT): 25 U/L (ref 10–47)
POC AST (SGOT): 37 U/L (ref 11–38)
POC TCO2: 28 MMOL/L (ref 18–33)
POTASSIUM BLD-SCNC: 4.5 MMOL/L (ref 3.6–5.1)
PROTEIN, POC: 8 G/DL (ref 6.4–8.1)
SODIUM BLD-SCNC: 144 MMOL/L (ref 128–145)

## 2020-02-22 PROCEDURE — 85025 COMPLETE CBC W/AUTO DIFF WBC: CPT | Mod: ER

## 2020-02-22 PROCEDURE — 25000003 PHARM REV CODE 250: Mod: ER | Performed by: PHYSICIAN ASSISTANT

## 2020-02-22 PROCEDURE — 99283 EMERGENCY DEPT VISIT LOW MDM: CPT | Mod: 25,ER

## 2020-02-22 PROCEDURE — 80053 COMPREHEN METABOLIC PANEL: CPT | Mod: ER

## 2020-02-22 RX ORDER — HYDROCODONE BITARTRATE AND ACETAMINOPHEN 5; 325 MG/1; MG/1
1 TABLET ORAL
Status: COMPLETED | OUTPATIENT
Start: 2020-02-22 | End: 2020-02-22

## 2020-02-22 RX ORDER — HYDROCODONE BITARTRATE AND ACETAMINOPHEN 5; 325 MG/1; MG/1
1 TABLET ORAL EVERY 6 HOURS PRN
Qty: 12 TABLET | Refills: 0 | OUTPATIENT
Start: 2020-02-22 | End: 2020-03-02 | Stop reason: HOSPADM

## 2020-02-22 RX ADMIN — HYDROCODONE BITARTRATE AND ACETAMINOPHEN 1 TABLET: 5; 325 TABLET ORAL at 03:02

## 2020-02-22 NOTE — TELEPHONE ENCOUNTER
Pt calling, states carpal tunnel sx recently and now has 10/10 pain at site unrelieved by Tylenol #3. Per triage protocol and nursing judgment, advised that he be seen within 4 hours at UC/ED. He verbalized understanding.    Reason for Disposition   [1] SEVERE post-op pain (e.g., excruciating, pain scale 8-10) AND [2] not controlled with pain medications    Additional Information   Negative: Sounds like a life-threatening emergency to the triager   Negative: [1] Widespread rash AND [2] bright red, sunburn-like   Negative: [1] Severe headache AND [2] after spinal (epidural) anesthesia   Negative: [1] Vomiting AND [2] persists > 4 hours   Negative: [1] Vomiting AND [2] abdomen looks much more swollen than usual   Negative: [1] Drinking very little AND [2] dehydration suspected (e.g., no urine > 12 hours, very dry mouth, very lightheaded)   Negative: Patient sounds very sick or weak to the triager   Negative: Sounds like a serious complication to the triager   Negative: Fever > 100.5 F (38.1 C)    Protocols used: POST-OP SYMPTOMS AND [id number]

## 2020-02-22 NOTE — ED PROVIDER NOTES
Encounter Date: 2/22/2020    SCRIBE #1 NOTE: I, Katiana Manning, am scribing for, and in the presence of,  CARIDAD Lopez. I have scribed the following portions of the note - Other sections scribed: HPI, ROS, PE.       History     Chief Complaint   Patient presents with    Post-op Problem     LEFT WRIST CARPAL TUNNEL SURGERY 2/19/20; NOW W INCREASED SWELLING AND PAIN X 1 DAY; DENIES NUMBNESS OR TINGLING IN AFFECTED HAND; ACE BANDAGE IN PLACE     Bj Pate Jr. is a 70 y.o. male who presents to the ED complaining of increased pain and swelling to the left wrist since yesterday. Patient had carpal tunnel surgery 3 days ago. Patient has been taking tylenol #3.  Denies fever, numbness, or tingling. Patient has an ace bandage on.    The history is provided by the patient. No  was used.     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     Benign hypertension with CKD (chronic kidney disease) stage III      Cataract     Chronic idiopathic gout of multiple sites     Chronic kidney disease, stage 3     COPD (chronic obstructive pulmonary disease)     Erectile dysfunction     Gout     Hemorrhoids without complication     Hyperlipidemia     Morbid obesity     Obstructive sleep apnea on CPAP     Stroke 2016, 2006    Thalamic infarct, acute (right) 1/2016    Type 2 DM with CKD stage 3 and hypertension     On pravastatin for cardiovascular protection.      Past Surgical History:   Procedure Laterality Date    CARPAL TUNNEL RELEASE Left 2/19/2020    Procedure: RELEASE, CARPAL TUNNEL LEFT;  Surgeon: Maria Luisa Mccurdy MD;  Location: [facility];  Service: Orthopedics;  Laterality: Left;     EPIDURAL STEROID INJECTION N/A 5/2/2019    Procedure: Injection, Steroid, Epidural Cervical;  Surgeon: Dariel Doan Jr., MD;  Location: [facility] ENDO;  Service: Pain Management;  Laterality: N/A;  Cervical Epidural Steroid Injection     [id number]    Arrive @ 1130; ASA; Check BG    EPIDURAL STEROID INJECTION Bilateral 5/29/2019    Procedure: Lumbar Medial Branch Blocks;  Surgeon: Dariel Doan Jr., MD;  Location: [facility] ENDO;  Service: Pain Management;  Laterality: Bilateral;  Bilateral Lumbar Medial Branch Blocks L3, L4, L5    [id number]  [id number]    Attive @ 1030 (11 arrival request); NO Sedation; ASA; Check BG    EPIDURAL STEROID INJECTION Bilateral 7/3/2019    Procedure: Lumbar Medial Branch Blocks;  Surgeon: Dariel Doan Jr., MD;  Location: [facility] ENDO;  Service: Pain Management;  Laterality: Bilateral;  Bilateral Lumbar Medial Branch Blocks L3, L4, L5    [id number]  [id number]    Arrive @ 0930; NO Sedation; ASA; Check BG    EPIDURAL STEROID INJECTION N/A 8/7/2019    Procedure: Injection, Steroid, Epidural Cervical;  Surgeon: Draiel Doan Jr., MD;  Location: [facility] ENDO;  Service: Pain Management;  Laterality: N/A;  Cervical Epidural Steroid Injection C7-T1    03137    Arrive @ 1115; Last ASA 7/30; Check BG    NO PAST SURGERIES       Family History   Problem Relation Age of Onset    No Known Problems Mother     No Known Problems Father     Hypertension Unknown     Diabetes Unknown     Diabetes Paternal Grandfather     Heart disease Paternal Grandfather     No Known Problems Sister     No Known Problems Brother     No Known Problems Maternal Aunt     No Known Problems Maternal Uncle     No Known Problems Paternal Aunt     No Known Problems Paternal Uncle     No Known Problems Maternal Grandmother     No Known Problems Maternal Grandfather     No Known Problems Paternal Grandmother     Amblyopia Neg Hx     Blindness Neg Hx     Cancer Neg Hx     Cataracts Neg Hx     Glaucoma Neg Hx     Macular  degeneration Neg Hx     Retinal detachment Neg Hx     Strabismus Neg Hx     Stroke Neg Hx     Thyroid disease Neg Hx      Social History     Tobacco Use    Smoking status: Never Smoker    Smokeless tobacco: Never Used   Substance Use Topics    Alcohol use: Yes     Alcohol/week: 0.0 standard drinks     Comment: occacionally    Drug use: No     Review of Systems   Constitutional: Negative for fever.   Musculoskeletal: Positive for arthralgias (left wrist with swelling).   Neurological: Negative for numbness.       Physical Exam     Initial Vitals [02/22/20 1511]   BP Pulse Resp Temp SpO2   (!) 168/87 85 18 98.3 °F (36.8 °C) 98 %      MAP       --         Physical Exam    Nursing note and vitals reviewed.  Constitutional: He appears well-developed and well-nourished. He is not diaphoretic. No distress.   HENT:   Head: Normocephalic and atraumatic.   Nose: Nose normal.   Eyes: Conjunctivae and EOM are normal. Pupils are equal, round, and reactive to light. Right eye exhibits no discharge. Left eye exhibits no discharge.   Neck: Normal range of motion. No tracheal deviation present. No JVD present.   Cardiovascular: Normal rate, regular rhythm and normal heart sounds. Exam reveals no friction rub.    No murmur heard.  Pulmonary/Chest: Breath sounds normal. No stridor. No respiratory distress. He has no wheezes. He has no rhonchi. He has no rales. He exhibits no tenderness.   Musculoskeletal:   Well held surgical incision with sutures in place to the dorsal surface of the left wrist.  There is very mild swelling.  No erythema, tenderness, or purulent drainage. No wound dehiscence.  The full ROM of wrist and digits radial pulses 2+ and equal.  Capillary refill less than 2 sec.  Sensation intact and equal.   Neurological: He is alert and oriented to person, place, and time.   Skin: Skin is warm and dry. No rash and no abscess noted. No erythema. No pallor.             ED Course   Procedures  Labs Reviewed   POCT CMP  - Abnormal; Notable for the following components:       Result Value    POC Creatinine 1.5 (*)     All other components within normal limits   POCT CBC          Imaging Results          X-Ray Wrist Complete Left (Final result)  Result time 02/22/20 15:57:43    Final result by Amish Moss MD (02/22/20 15:57:43)                 Impression:      1. No convincing acute displaced fracture or dislocation of the wrist noting nonspecific edema about the dorsal aspect of the hand and wrist, the hand edema is partially imaged.      Electronically signed by: Amish Moss MD  Date:    02/22/2020  Time:    15:57             Narrative:    EXAMINATION:  XR WRIST COMPLETE 3 VIEWS LEFT    CLINICAL HISTORY:  Pain in left wrist    TECHNIQUE:  PA, lateral, and oblique views of the left wrist were performed.    COMPARISON:  02/04/2020    FINDINGS:  Three views left wrist.    There is osteopenia.  Degenerative changes are noted of the wrist.  There is calcification in the region of the TFCC.  There is edema about the dorsal aspect of the hand and wrist.                                 Medical Decision Making:   History:   Old Medical Records: I decided to obtain old medical records.  Independently Interpreted Test(s):   I have ordered and independently interpreted X-rays - see prior notes.  Clinical Tests:   Lab Tests: Ordered and Reviewed  Radiological Study: Ordered and Reviewed  ED Management:  Despite more than 3 hrs of waiting in the ED for his surgeon to return page, I am unable to get in touch with his surgeon or colleague.  Instead, I did review case with orthopedist, Dr. Worthington, who advises patient is suitable for outpatient follow-up with his surgeon and no emergent indication for further management today.  Specifically no evidence of infectious process today.  No vascular compromise.  Strict return precautions discussed with patient who is agreeable to the plan.            Scribe Attestation:   Scribe #1: I performed  the above scribed service and the documentation accurately describes the services I performed. I attest to the accuracy of the note.    Scribe attestation: I, Camilo Roach, personally performed the services described in this documentation. All medical record entries made by the scribe were at my direction and in my presence.  I have reviewed the chart and agree that the record reflects my personal performance and is accurate and complete                       Clinical Impression:     1. Post-op pain    2. Left wrist pain                                Camilo Roach PA-C  02/22/20 [id number]

## 2020-02-23 NOTE — TELEPHONE ENCOUNTER
Patient calling to say he went to the ER    Reason for Disposition   [1] Follow-up call to recent contact AND [2] information only call, no triage required    Additional Information   Negative: [1] Caller is not with the adult (patient) AND [2] reporting urgent symptoms   Negative: Lab result questions   Negative: Medication questions   Negative: Caller can't be reached by phone   Negative: Caller has already spoken to PCP or another triager   Negative: RN needs further essential information from caller in order to complete triage   Negative: Requesting regular office appointment   Negative: [1] Caller requesting NON-URGENT health information AND [2] PCP's office is the best resource   Negative: [1] Caller is not with the adult (patient) AND [2] probable NON-URGENT symptoms   Negative: Question about upcoming scheduled test, no triage required and triager able to answer question   Negative: General information question, no triage required and triager able to answer question   Negative: Health Information question, no triage required and triager able to answer question    Protocols used: INFORMATION ONLY CALL-A-

## 2020-02-24 ENCOUNTER — TELEPHONE (OUTPATIENT)
Dept: ORTHOPEDICS | Facility: CLINIC | Age: 70
End: 2020-02-24

## 2020-02-24 NOTE — TELEPHONE ENCOUNTER
Left message for pt to return my call. CARIDAD Garcia - was notified of ER visits and wrist pain, and would like to see pt this afternoon @ 3:15 PM. Left message on voicemail regarding today's appt, and also that Shelley would like for him for follow up with his PCP

## 2020-02-24 NOTE — TELEPHONE ENCOUNTER
Attempted to contact pt. Left voicemail. Asked pt to return call to clinic at [phone] to confirm whether or not he is able to be evaluated today @ 9767 to f/u for his ED visits s/p [facility] 02/19/20.

## 2020-02-26 ENCOUNTER — TELEPHONE (OUTPATIENT)
Dept: FAMILY MEDICINE | Facility: CLINIC | Age: 70
End: 2020-02-26

## 2020-02-26 ENCOUNTER — HOSPITAL ENCOUNTER (INPATIENT)
Facility: HOSPITAL | Age: 70
LOS: 5 days | Discharge: NURSING, REHAB OR LONG-TERM CARE | DRG: 554 | End: 2020-03-02
Attending: EMERGENCY MEDICINE | Admitting: EMERGENCY MEDICINE
Payer: MEDICARE

## 2020-02-26 DIAGNOSIS — M13.0 POLYARTHROPATHY OR POLYARTHRITIS OF MULTIPLE SITES: Primary | ICD-10-CM

## 2020-02-26 DIAGNOSIS — R33.9 URINARY RETENTION: ICD-10-CM

## 2020-02-26 DIAGNOSIS — M1A.09X1 IDIOPATHIC CHRONIC GOUT OF MULTIPLE SITES WITH TOPHUS: ICD-10-CM

## 2020-02-26 DIAGNOSIS — Z74.09 DECREASED FUNCTIONAL MOBILITY AND ENDURANCE: ICD-10-CM

## 2020-02-26 DIAGNOSIS — R60.9 EDEMA: ICD-10-CM

## 2020-02-26 DIAGNOSIS — M10.09 ACUTE IDIOPATHIC GOUT OF MULTIPLE SITES: ICD-10-CM

## 2020-02-26 DIAGNOSIS — R74.01 TRANSAMINITIS: ICD-10-CM

## 2020-02-26 DIAGNOSIS — N18.9 ACUTE KIDNEY INJURY SUPERIMPOSED ON CHRONIC KIDNEY DISEASE: ICD-10-CM

## 2020-02-26 DIAGNOSIS — M10.9 POLYARTICULAR GOUT: ICD-10-CM

## 2020-02-26 DIAGNOSIS — E78.5 DYSLIPIDEMIA: ICD-10-CM

## 2020-02-26 DIAGNOSIS — N17.9 ACUTE KIDNEY INJURY SUPERIMPOSED ON CHRONIC KIDNEY DISEASE: ICD-10-CM

## 2020-02-26 DIAGNOSIS — G56.02 LEFT CARPAL TUNNEL SYNDROME: ICD-10-CM

## 2020-02-26 DIAGNOSIS — R53.1 GENERALIZED WEAKNESS: ICD-10-CM

## 2020-02-26 DIAGNOSIS — G56.00 CARPAL TUNNEL SYNDROME, UNSPECIFIED LATERALITY: Primary | ICD-10-CM

## 2020-02-26 DIAGNOSIS — R78.81 BACTEREMIA: ICD-10-CM

## 2020-02-26 LAB
ALBUMIN SERPL BCP-MCNC: 2.6 G/DL (ref 3.5–5.2)
ALP SERPL-CCNC: 120 U/L (ref 55–135)
ALT SERPL W/O P-5'-P-CCNC: 165 U/L (ref 10–44)
ANION GAP SERPL CALC-SCNC: 13 MMOL/L (ref 8–16)
AST SERPL-CCNC: 234 U/L (ref 10–40)
BACTERIA #/AREA URNS AUTO: ABNORMAL /HPF
BASOPHILS # BLD AUTO: 0.02 K/UL (ref 0–0.2)
BASOPHILS NFR BLD: 0.1 % (ref 0–1.9)
BILIRUB SERPL-MCNC: 2.3 MG/DL (ref 0.1–1)
BILIRUB UR QL STRIP: NEGATIVE
BUN SERPL-MCNC: 66 MG/DL (ref 8–23)
CALCIUM SERPL-MCNC: 9.6 MG/DL (ref 8.7–10.5)
CHLORIDE SERPL-SCNC: 101 MMOL/L (ref 95–110)
CLARITY UR REFRACT.AUTO: CLEAR
CO2 SERPL-SCNC: 22 MMOL/L (ref 23–29)
COLOR UR AUTO: ABNORMAL
CREAT SERPL-MCNC: 2.2 MG/DL (ref 0.5–1.4)
DIFFERENTIAL METHOD: ABNORMAL
EOSINOPHIL # BLD AUTO: 0 K/UL (ref 0–0.5)
EOSINOPHIL NFR BLD: 0 % (ref 0–8)
ERYTHROCYTE [DISTWIDTH] IN BLOOD BY AUTOMATED COUNT: 14.6 % (ref 11.5–14.5)
EST. GFR  (AFRICAN AMERICAN): 34.3 ML/MIN/1.73 M^2
EST. GFR  (NON AFRICAN AMERICAN): 29.7 ML/MIN/1.73 M^2
GLUCOSE SERPL-MCNC: 140 MG/DL (ref 70–110)
GLUCOSE UR QL STRIP: NEGATIVE
HCT VFR BLD AUTO: 37 % (ref 40–54)
HGB BLD-MCNC: 11.5 G/DL (ref 14–18)
HGB UR QL STRIP: ABNORMAL
HYALINE CASTS UR QL AUTO: 2 /LPF
IMM GRANULOCYTES # BLD AUTO: 0.22 K/UL (ref 0–0.04)
IMM GRANULOCYTES NFR BLD AUTO: 1.2 % (ref 0–0.5)
KETONES UR QL STRIP: NEGATIVE
LEUKOCYTE ESTERASE UR QL STRIP: NEGATIVE
LYMPHOCYTES # BLD AUTO: 0.6 K/UL (ref 1–4.8)
LYMPHOCYTES NFR BLD: 3.2 % (ref 18–48)
MAGNESIUM SERPL-MCNC: 2.3 MG/DL (ref 1.6–2.6)
MCH RBC QN AUTO: 28 PG (ref 27–31)
MCHC RBC AUTO-ENTMCNC: 31.1 G/DL (ref 32–36)
MCV RBC AUTO: 90 FL (ref 82–98)
MICROSCOPIC COMMENT: ABNORMAL
MONOCYTES # BLD AUTO: 2.7 K/UL (ref 0.3–1)
MONOCYTES NFR BLD: 15.4 % (ref 4–15)
NEUTROPHILS # BLD AUTO: 14.2 K/UL (ref 1.8–7.7)
NEUTROPHILS NFR BLD: 80.1 % (ref 38–73)
NITRITE UR QL STRIP: NEGATIVE
NRBC BLD-RTO: 0 /100 WBC
PH UR STRIP: 5 [PH] (ref 5–8)
PLATELET # BLD AUTO: 256 K/UL (ref 150–350)
PLATELET BLD QL SMEAR: ABNORMAL
PMV BLD AUTO: 10.5 FL (ref 9.2–12.9)
POTASSIUM SERPL-SCNC: 4.4 MMOL/L (ref 3.5–5.1)
PROT SERPL-MCNC: 8.1 G/DL (ref 6–8.4)
PROT UR QL STRIP: NEGATIVE
RBC # BLD AUTO: 4.11 M/UL (ref 4.6–6.2)
RBC #/AREA URNS AUTO: 1 /HPF (ref 0–4)
SODIUM SERPL-SCNC: 136 MMOL/L (ref 136–145)
SP GR UR STRIP: 1.01 (ref 1–1.03)
SQUAMOUS #/AREA URNS AUTO: 0 /HPF
URN SPEC COLLECT METH UR: ABNORMAL
WBC # BLD AUTO: 17.69 K/UL (ref 3.9–12.7)
WBC #/AREA URNS AUTO: 1 /HPF (ref 0–5)
WBC TOXIC VACUOLES BLD QL SMEAR: PRESENT

## 2020-02-26 PROCEDURE — 96360 HYDRATION IV INFUSION INIT: CPT

## 2020-02-26 PROCEDURE — G0378 HOSPITAL OBSERVATION PER HR: HCPCS

## 2020-02-26 PROCEDURE — 85652 RBC SED RATE AUTOMATED: CPT

## 2020-02-26 PROCEDURE — 63600175 PHARM REV CODE 636 W HCPCS: Performed by: EMERGENCY MEDICINE

## 2020-02-26 PROCEDURE — 83735 ASSAY OF MAGNESIUM: CPT

## 2020-02-26 PROCEDURE — 82550 ASSAY OF CK (CPK): CPT

## 2020-02-26 PROCEDURE — 83615 LACTATE (LD) (LDH) ENZYME: CPT

## 2020-02-26 PROCEDURE — 86140 C-REACTIVE PROTEIN: CPT

## 2020-02-26 PROCEDURE — 80053 COMPREHEN METABOLIC PANEL: CPT

## 2020-02-26 PROCEDURE — 12000002 HC ACUTE/MED SURGE SEMI-PRIVATE ROOM

## 2020-02-26 PROCEDURE — 80329 ANALGESICS NON-OPIOID 1 OR 2: CPT

## 2020-02-26 PROCEDURE — 81001 URINALYSIS AUTO W/SCOPE: CPT

## 2020-02-26 PROCEDURE — 51702 INSERT TEMP BLADDER CATH: CPT

## 2020-02-26 PROCEDURE — 87040 BLOOD CULTURE FOR BACTERIA: CPT

## 2020-02-26 PROCEDURE — 96361 HYDRATE IV INFUSION ADD-ON: CPT

## 2020-02-26 PROCEDURE — 99285 EMERGENCY DEPT VISIT HI MDM: CPT | Mod: 25

## 2020-02-26 PROCEDURE — 99285 EMERGENCY DEPT VISIT HI MDM: CPT | Mod: ,,, | Performed by: EMERGENCY MEDICINE

## 2020-02-26 PROCEDURE — 85025 COMPLETE CBC W/AUTO DIFF WBC: CPT

## 2020-02-26 PROCEDURE — 99285 PR EMERGENCY DEPT VISIT,LEVEL V: ICD-10-PCS | Mod: ,,, | Performed by: EMERGENCY MEDICINE

## 2020-02-26 RX ADMIN — SODIUM CHLORIDE 500 ML: 0.9 INJECTION, SOLUTION INTRAVENOUS at 09:02

## 2020-02-26 RX ADMIN — SODIUM CHLORIDE 1000 ML: 0.9 INJECTION, SOLUTION INTRAVENOUS at 11:02

## 2020-02-26 NOTE — TELEPHONE ENCOUNTER
----- Message from Larisa Garcia sent at 2/26/2020  9:32 AM CST -----  Type: Patient Call Back    Who called: daughter / Ashlie     What is the request in detail: Rep states pt has surgery on Wednesday for Carpal Tunnel. She is asking for therapy to help pt with the movement, pain, and also to have someone to monitor him b/c he lives alone. .     Can the clinic reply by [facility]? No     Would the patient rather a call back or a response via [facility]? Call back     Best call back number: [phone]    Additional Information:

## 2020-02-26 NOTE — TELEPHONE ENCOUNTER
----- Message from Leti Andrade sent at 2/26/2020  1:53 PM CST -----  Contact: Daughter-   Type: Patient Call Back    Who called: Stefanie Ha    What is the request in detail: Asking for HH order or rehab for dad. He isnt able to take care of himself.     Can the clinic reply by [facility]?  Call   Would the patient rather a call back or a response via [facility]? Call   Best call back number: [phone]      Additional Information:

## 2020-02-27 ENCOUNTER — CLINICAL SUPPORT (OUTPATIENT)
Dept: CARDIOLOGY | Facility: CLINIC | Age: 70
DRG: 554 | End: 2020-02-27
Attending: INTERNAL MEDICINE
Payer: MEDICARE

## 2020-02-27 PROBLEM — R33.9 URINARY RETENTION: Status: ACTIVE | Noted: 2020-02-27

## 2020-02-27 PROBLEM — N18.9 ACUTE KIDNEY INJURY SUPERIMPOSED ON CHRONIC KIDNEY DISEASE: Status: ACTIVE | Noted: 2020-02-27

## 2020-02-27 PROBLEM — N17.9 ACUTE KIDNEY INJURY SUPERIMPOSED ON CHRONIC KIDNEY DISEASE: Status: ACTIVE | Noted: 2020-02-27

## 2020-02-27 PROBLEM — M62.82 NON-TRAUMATIC RHABDOMYOLYSIS: Status: ACTIVE | Noted: 2020-02-27

## 2020-02-27 PROBLEM — M13.0 POLYARTHROPATHY OR POLYARTHRITIS OF MULTIPLE SITES: Status: ACTIVE | Noted: 2020-02-27

## 2020-02-27 PROBLEM — R74.01 TRANSAMINITIS: Status: ACTIVE | Noted: 2020-02-27

## 2020-02-27 LAB
ALBUMIN SERPL BCP-MCNC: 2.2 G/DL (ref 3.5–5.2)
ALP SERPL-CCNC: 114 U/L (ref 55–135)
ALT SERPL W/O P-5'-P-CCNC: 133 U/L (ref 10–44)
ANION GAP SERPL CALC-SCNC: 10 MMOL/L (ref 8–16)
APAP SERPL-MCNC: <3 UG/ML (ref 10–20)
APPEARANCE FLD: NORMAL
AST SERPL-CCNC: 178 U/L (ref 10–40)
BASOPHILS # BLD AUTO: 0.02 K/UL (ref 0–0.2)
BASOPHILS NFR BLD: 0.1 % (ref 0–1.9)
BILIRUB SERPL-MCNC: 2 MG/DL (ref 0.1–1)
BODY FLD TYPE: ABNORMAL
BODY FLD TYPE: NORMAL
BUN SERPL-MCNC: 68 MG/DL (ref 8–23)
CALCIUM SERPL-MCNC: 8.9 MG/DL (ref 8.7–10.5)
CHLORIDE SERPL-SCNC: 103 MMOL/L (ref 95–110)
CK SERPL-CCNC: 2343 U/L (ref 20–200)
CK SERPL-CCNC: 2681 U/L (ref 20–200)
CO2 SERPL-SCNC: 24 MMOL/L (ref 23–29)
COLOR FLD: YELLOW
CREAT SERPL-MCNC: 2 MG/DL (ref 0.5–1.4)
CRP SERPL-MCNC: 416.83 MG/L (ref 0–3.19)
CRP SERPL-MCNC: 434.9 MG/L (ref 0–8.2)
CRYSTALS FLD MICRO: POSITIVE
DIFFERENTIAL METHOD: ABNORMAL
EOSINOPHIL # BLD AUTO: 0 K/UL (ref 0–0.5)
EOSINOPHIL NFR BLD: 0 % (ref 0–8)
ERYTHROCYTE [DISTWIDTH] IN BLOOD BY AUTOMATED COUNT: 14.8 % (ref 11.5–14.5)
ERYTHROCYTE [SEDIMENTATION RATE] IN BLOOD BY WESTERGREN METHOD: >120 MM/HR (ref 0–23)
EST. GFR  (AFRICAN AMERICAN): 38.5 ML/MIN/1.73 M^2
EST. GFR  (NON AFRICAN AMERICAN): 33.3 ML/MIN/1.73 M^2
GLUCOSE SERPL-MCNC: 110 MG/DL (ref 70–110)
HAV IGM SERPL QL IA: NEGATIVE
HBV CORE IGM SERPL QL IA: NEGATIVE
HBV SURFACE AG SERPL QL IA: NEGATIVE
HCT VFR BLD AUTO: 32.7 % (ref 40–54)
HCV AB SERPL QL IA: NEGATIVE
HGB BLD-MCNC: 10.1 G/DL (ref 14–18)
IMM GRANULOCYTES # BLD AUTO: 0.12 K/UL (ref 0–0.04)
IMM GRANULOCYTES NFR BLD AUTO: 0.7 % (ref 0–0.5)
LDH SERPL L TO P-CCNC: 525 U/L (ref 110–260)
LYMPHOCYTES # BLD AUTO: 0.9 K/UL (ref 1–4.8)
LYMPHOCYTES NFR BLD: 4.9 % (ref 18–48)
LYMPHOCYTES NFR FLD MANUAL: 1 %
MAGNESIUM SERPL-MCNC: 2.2 MG/DL (ref 1.6–2.6)
MCH RBC QN AUTO: 28.1 PG (ref 27–31)
MCHC RBC AUTO-ENTMCNC: 30.9 G/DL (ref 32–36)
MCV RBC AUTO: 91 FL (ref 82–98)
MONOCYTES # BLD AUTO: 2.7 K/UL (ref 0.3–1)
MONOCYTES NFR BLD: 14.6 % (ref 4–15)
NEUTROPHILS # BLD AUTO: 14.5 K/UL (ref 1.8–7.7)
NEUTROPHILS NFR BLD: 79.7 % (ref 38–73)
NEUTROPHILS NFR FLD MANUAL: 99 %
NRBC BLD-RTO: 0 /100 WBC
OSMOLALITY UR: 478 MOSM/KG (ref 50–1200)
PHOSPHATE SERPL-MCNC: 3.5 MG/DL (ref 2.7–4.5)
PLATELET # BLD AUTO: 255 K/UL (ref 150–350)
PMV BLD AUTO: 10.7 FL (ref 9.2–12.9)
POTASSIUM SERPL-SCNC: 4.2 MMOL/L (ref 3.5–5.1)
PROT SERPL-MCNC: 7.1 G/DL (ref 6–8.4)
RBC # BLD AUTO: 3.59 M/UL (ref 4.6–6.2)
SODIUM SERPL-SCNC: 137 MMOL/L (ref 136–145)
SODIUM UR-SCNC: 25 MMOL/L (ref 20–250)
T4 FREE SERPL-MCNC: 1.01 NG/DL (ref 0.71–1.51)
TSH SERPL DL<=0.005 MIU/L-ACNC: 0.45 UIU/ML (ref 0.4–4)
URATE SERPL-MCNC: 11.4 MG/DL (ref 3.4–7)
WBC # BLD AUTO: 18.22 K/UL (ref 3.9–12.7)
WBC # FLD: NORMAL /CU MM

## 2020-02-27 PROCEDURE — 93971 EXTREMITY STUDY: CPT | Mod: 26,LT,, | Performed by: INTERNAL MEDICINE

## 2020-02-27 PROCEDURE — 82550 ASSAY OF CK (CPK): CPT

## 2020-02-27 PROCEDURE — 93971 CV US DOPPLER VENOUS ARM LEFT (CUPID ONLY): ICD-10-PCS | Mod: 26,LT,, | Performed by: INTERNAL MEDICINE

## 2020-02-27 PROCEDURE — 27000190 HC CPAP FULL FACE MASK W/VALVE

## 2020-02-27 PROCEDURE — 99900035 HC TECH TIME PER 15 MIN (STAT)

## 2020-02-27 PROCEDURE — 84300 ASSAY OF URINE SODIUM: CPT

## 2020-02-27 PROCEDURE — 84443 ASSAY THYROID STIM HORMONE: CPT

## 2020-02-27 PROCEDURE — 87070 CULTURE OTHR SPECIMN AEROBIC: CPT

## 2020-02-27 PROCEDURE — 96361 HYDRATE IV INFUSION ADD-ON: CPT

## 2020-02-27 PROCEDURE — 83735 ASSAY OF MAGNESIUM: CPT

## 2020-02-27 PROCEDURE — 84100 ASSAY OF PHOSPHORUS: CPT

## 2020-02-27 PROCEDURE — 63600175 PHARM REV CODE 636 W HCPCS: Performed by: FAMILY MEDICINE

## 2020-02-27 PROCEDURE — 99223 PR INITIAL HOSPITAL CARE,LEVL III: ICD-10-PCS | Mod: ,,, | Performed by: INTERNAL MEDICINE

## 2020-02-27 PROCEDURE — 83935 ASSAY OF URINE OSMOLALITY: CPT

## 2020-02-27 PROCEDURE — 87798 DETECT AGENT NOS DNA AMP: CPT

## 2020-02-27 PROCEDURE — 94660 CPAP INITIATION&MGMT: CPT

## 2020-02-27 PROCEDURE — 99223 1ST HOSP IP/OBS HIGH 75: CPT | Mod: ,,, | Performed by: FAMILY MEDICINE

## 2020-02-27 PROCEDURE — 93971 EXTREMITY STUDY: CPT | Mod: LT

## 2020-02-27 PROCEDURE — 89060 EXAM SYNOVIAL FLUID CRYSTALS: CPT

## 2020-02-27 PROCEDURE — 25000003 PHARM REV CODE 250: Performed by: FAMILY MEDICINE

## 2020-02-27 PROCEDURE — 86141 C-REACTIVE PROTEIN HS: CPT

## 2020-02-27 PROCEDURE — 20610 ARTHROCENTESIS: ICD-10-PCS | Mod: RT,,, | Performed by: STUDENT IN AN ORGANIZED HEALTH CARE EDUCATION/TRAINING PROGRAM

## 2020-02-27 PROCEDURE — 80053 COMPREHEN METABOLIC PANEL: CPT

## 2020-02-27 PROCEDURE — 20610 DRAIN/INJ JOINT/BURSA W/O US: CPT | Mod: RT,,, | Performed by: STUDENT IN AN ORGANIZED HEALTH CARE EDUCATION/TRAINING PROGRAM

## 2020-02-27 PROCEDURE — 99223 PR INITIAL HOSPITAL CARE,LEVL III: ICD-10-PCS | Mod: ,,, | Performed by: FAMILY MEDICINE

## 2020-02-27 PROCEDURE — 94761 N-INVAS EAR/PLS OXIMETRY MLT: CPT

## 2020-02-27 PROCEDURE — 25000242 PHARM REV CODE 250 ALT 637 W/ HCPCS: Performed by: FAMILY MEDICINE

## 2020-02-27 PROCEDURE — 89051 BODY FLUID CELL COUNT: CPT

## 2020-02-27 PROCEDURE — 11000001 HC ACUTE MED/SURG PRIVATE ROOM

## 2020-02-27 PROCEDURE — 87075 CULTR BACTERIA EXCEPT BLOOD: CPT

## 2020-02-27 PROCEDURE — 63600175 PHARM REV CODE 636 W HCPCS: Performed by: INTERNAL MEDICINE

## 2020-02-27 PROCEDURE — 84439 ASSAY OF FREE THYROXINE: CPT

## 2020-02-27 PROCEDURE — 85025 COMPLETE CBC W/AUTO DIFF WBC: CPT

## 2020-02-27 PROCEDURE — 99223 1ST HOSP IP/OBS HIGH 75: CPT | Mod: ,,, | Performed by: INTERNAL MEDICINE

## 2020-02-27 PROCEDURE — 80074 ACUTE HEPATITIS PANEL: CPT

## 2020-02-27 PROCEDURE — 84550 ASSAY OF BLOOD/URIC ACID: CPT

## 2020-02-27 RX ORDER — ACETAMINOPHEN 325 MG/1
650 TABLET ORAL EVERY 4 HOURS PRN
Status: DISCONTINUED | OUTPATIENT
Start: 2020-02-27 | End: 2020-03-02 | Stop reason: HOSPADM

## 2020-02-27 RX ORDER — GABAPENTIN 300 MG/1
600 CAPSULE ORAL 3 TIMES DAILY
Status: DISCONTINUED | OUTPATIENT
Start: 2020-02-27 | End: 2020-03-02 | Stop reason: HOSPADM

## 2020-02-27 RX ORDER — IBUPROFEN 200 MG
24 TABLET ORAL
Status: DISCONTINUED | OUTPATIENT
Start: 2020-02-27 | End: 2020-03-02 | Stop reason: HOSPADM

## 2020-02-27 RX ORDER — AMOXICILLIN 250 MG
1 CAPSULE ORAL 2 TIMES DAILY
Status: DISCONTINUED | OUTPATIENT
Start: 2020-02-27 | End: 2020-03-02

## 2020-02-27 RX ORDER — ASPIRIN 81 MG/1
81 TABLET ORAL DAILY
Status: CANCELLED | OUTPATIENT
Start: 2020-02-27

## 2020-02-27 RX ORDER — GLUCAGON 1 MG
1 KIT INJECTION
Status: DISCONTINUED | OUTPATIENT
Start: 2020-02-27 | End: 2020-03-02 | Stop reason: HOSPADM

## 2020-02-27 RX ORDER — IPRATROPIUM BROMIDE AND ALBUTEROL SULFATE 2.5; .5 MG/3ML; MG/3ML
3 SOLUTION RESPIRATORY (INHALATION) EVERY 4 HOURS PRN
Status: DISCONTINUED | OUTPATIENT
Start: 2020-02-27 | End: 2020-03-02 | Stop reason: HOSPADM

## 2020-02-27 RX ORDER — INSULIN ASPART 100 [IU]/ML
0-5 INJECTION, SOLUTION INTRAVENOUS; SUBCUTANEOUS
Status: DISCONTINUED | OUTPATIENT
Start: 2020-02-27 | End: 2020-03-02 | Stop reason: HOSPADM

## 2020-02-27 RX ORDER — ONDANSETRON 2 MG/ML
4 INJECTION INTRAMUSCULAR; INTRAVENOUS EVERY 8 HOURS PRN
Status: DISCONTINUED | OUTPATIENT
Start: 2020-02-27 | End: 2020-03-02 | Stop reason: HOSPADM

## 2020-02-27 RX ORDER — HEPARIN SODIUM 5000 [USP'U]/ML
5000 INJECTION, SOLUTION INTRAVENOUS; SUBCUTANEOUS EVERY 8 HOURS
Status: DISCONTINUED | OUTPATIENT
Start: 2020-02-27 | End: 2020-03-02 | Stop reason: HOSPADM

## 2020-02-27 RX ORDER — IBUPROFEN 200 MG
16 TABLET ORAL
Status: DISCONTINUED | OUTPATIENT
Start: 2020-02-27 | End: 2020-03-02 | Stop reason: HOSPADM

## 2020-02-27 RX ORDER — ATORVASTATIN CALCIUM 20 MG/1
40 TABLET, FILM COATED ORAL DAILY
Status: DISCONTINUED | OUTPATIENT
Start: 2020-02-27 | End: 2020-02-27

## 2020-02-27 RX ORDER — OXYCODONE HYDROCHLORIDE 5 MG/1
5 TABLET ORAL EVERY 6 HOURS PRN
Status: DISCONTINUED | OUTPATIENT
Start: 2020-02-27 | End: 2020-03-02 | Stop reason: HOSPADM

## 2020-02-27 RX ORDER — DILTIAZEM HYDROCHLORIDE 240 MG/1
240 CAPSULE, COATED, EXTENDED RELEASE ORAL DAILY
Status: DISCONTINUED | OUTPATIENT
Start: 2020-02-27 | End: 2020-03-02 | Stop reason: HOSPADM

## 2020-02-27 RX ORDER — OXYCODONE HYDROCHLORIDE 10 MG/1
10 TABLET ORAL EVERY 6 HOURS PRN
Status: DISCONTINUED | OUTPATIENT
Start: 2020-02-27 | End: 2020-03-02 | Stop reason: HOSPADM

## 2020-02-27 RX ORDER — SODIUM CHLORIDE 0.9 % (FLUSH) 0.9 %
10 SYRINGE (ML) INJECTION
Status: DISCONTINUED | OUTPATIENT
Start: 2020-02-27 | End: 2020-03-02 | Stop reason: HOSPADM

## 2020-02-27 RX ORDER — PREDNISONE 20 MG/1
40 TABLET ORAL ONCE
Status: DISCONTINUED | OUTPATIENT
Start: 2020-02-27 | End: 2020-02-27

## 2020-02-27 RX ORDER — SODIUM CHLORIDE 9 MG/ML
INJECTION, SOLUTION INTRAVENOUS CONTINUOUS
Status: DISCONTINUED | OUTPATIENT
Start: 2020-02-27 | End: 2020-02-29

## 2020-02-27 RX ORDER — FLUTICASONE FUROATE AND VILANTEROL 100; 25 UG/1; UG/1
1 POWDER RESPIRATORY (INHALATION) DAILY
Status: DISCONTINUED | OUTPATIENT
Start: 2020-02-27 | End: 2020-03-02 | Stop reason: HOSPADM

## 2020-02-27 RX ADMIN — FLUTICASONE FUROATE AND VILANTEROL TRIFENATATE 1 PUFF: 100; 25 POWDER RESPIRATORY (INHALATION) at 09:02

## 2020-02-27 RX ADMIN — SODIUM CHLORIDE: 0.9 INJECTION, SOLUTION INTRAVENOUS at 02:02

## 2020-02-27 RX ADMIN — GABAPENTIN 600 MG: 300 CAPSULE ORAL at 08:02

## 2020-02-27 RX ADMIN — GABAPENTIN 600 MG: 300 CAPSULE ORAL at 03:02

## 2020-02-27 RX ADMIN — HEPARIN SODIUM 5000 UNITS: 5000 INJECTION, SOLUTION INTRAVENOUS; SUBCUTANEOUS at 02:02

## 2020-02-27 RX ADMIN — ATORVASTATIN CALCIUM 40 MG: 20 TABLET, FILM COATED ORAL at 09:02

## 2020-02-27 RX ADMIN — OXYCODONE HYDROCHLORIDE 10 MG: 10 TABLET ORAL at 08:02

## 2020-02-27 RX ADMIN — HEPARIN SODIUM 5000 UNITS: 5000 INJECTION, SOLUTION INTRAVENOUS; SUBCUTANEOUS at 10:02

## 2020-02-27 RX ADMIN — GABAPENTIN 600 MG: 300 CAPSULE ORAL at 09:02

## 2020-02-27 RX ADMIN — SENNOSIDES AND DOCUSATE SODIUM 1 TABLET: 8.6; 5 TABLET ORAL at 09:02

## 2020-02-27 RX ADMIN — ACETAMINOPHEN 650 MG: 325 TABLET ORAL at 01:02

## 2020-02-27 RX ADMIN — OXYCODONE HYDROCHLORIDE 10 MG: 10 TABLET ORAL at 12:02

## 2020-02-27 RX ADMIN — DILTIAZEM HYDROCHLORIDE 240 MG: 240 CAPSULE, COATED, EXTENDED RELEASE ORAL at 09:02

## 2020-02-27 RX ADMIN — SENNOSIDES AND DOCUSATE SODIUM 1 TABLET: 8.6; 5 TABLET ORAL at 08:02

## 2020-02-27 RX ADMIN — METHYLPREDNISOLONE SODIUM SUCCINATE 80 MG: 40 INJECTION, POWDER, FOR SOLUTION INTRAMUSCULAR; INTRAVENOUS at 08:02

## 2020-02-27 NOTE — ASSESSMENT & PLAN NOTE
Patient presenting with progressively worsening upper and lower extremity weakness and polyarthopathy of unclear etiology. History of chronic gout affecting multiple sites as well as reported history of cervical radiculopathy and DDD lumbar. No neck pain or sensory deficits.  - CRP grossly elevated 434  -   - CPK 2343  - Patients history and lab findings concerning for underlying autoimmune/rheumatologic process.  - Rheumatology consulted  ;  Appreciate recs  - Uric acid pending  - G and C urine PCR pending  - RPR pending  - CT head and cervical pending

## 2020-02-27 NOTE — HPI
This is a 69yo male with a past medical history of CVA without residual deficit, COPD, HILARIO on CPAP, gout, DDD of Lumbar, Cervical Radiculopathy, and HTN who presents to the ED with chief complaint of worsening weakness and joint pain effecting upper and lower extremities bilaterally. One week ago on 02/19/20 patient underwent left hand carpal release procedure. He reports that since that time he has had gradually progressive worsening of symptoms of upper and lower extremity weakness and joint pain, fatigue, and decreased appetite. Three days ago the patient developed new symptoms of urinary incontinence and so he presented to the ED. Workup was largely unremarkable but he was prescribed Bactrim for slightly abnormal UA in setting of urinary symptoms for presumed UTI. No culture performed. He has not taken this antibiotic. His symptoms continued to worsen and he was seen in the ED last night 12/25/20 and again had unremarkable workup. Today patient reports that his symptoms have significantly worsened and he states he can no longer lift his legs off the bed or his arms off his chest. Sensation intact. Significant pain affecting joints of the hand, elbows, knees, and ankles. Straight leg test negative bilateral. Wiggles toes and plantar/dorsiflexes feet with 5/5 strength. No saddle anesthesia. Denies neck pain and full range of motion of neck. He is afebrile and hemodynamically stable. WBC 17. Creatinine 2.2 (baseline 1.5). CRP grossly elevated 434. CPK 2343. . CT abdomen unremarkable. He has been admitted to the care of medicine for further evaluation and management. He denies fevers, chills, chest pain, abdominal pain, shortness of breath, cough, nausea, vomiting, or confusion.

## 2020-02-27 NOTE — PROVIDER PROGRESS NOTES - EMERGENCY DEPT.
Encounter Date: 2/26/2020    ED Physician Progress Notes         I received sign-out on this patient.   70-year-old male with recent carpal tunnel surgery presenting to ER with complaint of global fatigue.  Labs remarkable for elevated AST and ALT.  At time of sign-out, CT of the abdomen is pending for admission.      CT without acute abnormality.  Patient was admitted to Medicine for further evaluation.

## 2020-02-27 NOTE — ASSESSMENT & PLAN NOTE
POD7 sp left carpal tunnel release  - incisions clean dry and intact without evidence of acute infection

## 2020-02-27 NOTE — PROCEDURES
"Bj Pate Jr. is a 70 y.o. male patient.    Temp: (!) 100.8 °F (38.2 °C) (02/27/20 1638)  Pulse: 110 (02/27/20 1638)  Resp: 18 (02/27/20 1638)  BP: (!) 130/57 (02/27/20 1638)  SpO2: (!) 92 % (02/27/20 1638)  Weight: (!) 148 kg (326 lb 4.5 oz) (02/27/20 0159)  Height: 5' 9" (175.3 cm) (02/27/20 0159)       Arthrocentesis  Date/Time: 2/27/2020 4:56 PM  Performed by: Jasiel Long MD  Authorized by: Jasiel Long MD   Site marked: the operative site was marked  Time out: Immediately prior to procedure a "time out" was called to verify the correct patient, procedure, equipment, support staff and site/side marked as required.  Indications: joint swelling, pain and diagnostic evaluation   Body area: knee  Joint: right knee  Local anesthesia used: yes    Anesthesia:  Local anesthesia used: yes  Local Anesthetic: lidocaine spray  Needle size: 22 G  Approach: lateral  Aspirate amount: 10 mL  Aspirate: cloudy and yellow  Comments: Patient tolerated procedure well. On our visualization intracellular urate crystals seen. Fluid sent off for formal analysis of cell count, crystal and cultures                   Jasiel Long  2/27/2020  "

## 2020-02-27 NOTE — SUBJECTIVE & OBJECTIVE
Past Medical History:   Diagnosis Date    Anticoagulant long-term use     Basal ganglia hemorrhage     Left basal ganglia hemorrhage with resultant right-sided hemiparesis which has resolved.     Benign hypertension with CKD (chronic kidney disease) stage III      Cataract     Chronic idiopathic gout of multiple sites     Chronic kidney disease, stage 3     COPD (chronic obstructive pulmonary disease)     Erectile dysfunction     Gout     Hemorrhoids without complication     Hyperlipidemia     Morbid obesity     Obstructive sleep apnea on CPAP     Stroke 2016, 2006    Thalamic infarct, acute (right) 1/2016    Type 2 DM with CKD stage 3 and hypertension     On pravastatin for cardiovascular protection.        Past Surgical History:   Procedure Laterality Date    CARPAL TUNNEL RELEASE Left 2/19/2020    Procedure: RELEASE, CARPAL TUNNEL LEFT;  Surgeon: Maria Luisa Mccurdy MD;  Location: [facility];  Service: Orthopedics;  Laterality: Left;    EPIDURAL STEROID INJECTION N/A 5/2/2019    Procedure: Injection, Steroid, Epidural Cervical;  Surgeon: Dariel Doan Jr., MD;  Location: [facility] ENDO;  Service: Pain Management;  Laterality: N/A;  Cervical Epidural Steroid Injection     [id number]    Arrive @ 1130; ASA; Check BG    EPIDURAL STEROID INJECTION Bilateral 5/29/2019    Procedure: Lumbar Medial Branch Blocks;  Surgeon: Dariel Doan Jr., MD;  Location: [facility] ENDO;  Service: Pain Management;  Laterality: Bilateral;  Bilateral Lumbar Medial Branch Blocks L3, L4, L5    [id number]  [id number]    Attive @ 1030 (11 arrival request); NO Sedation; ASA; Check BG    EPIDURAL STEROID INJECTION Bilateral 7/3/2019    Procedure: Lumbar Medial Branch Blocks;  Surgeon: Dariel Doan Jr., MD;  Location: [facility] ENDO;  Service: Pain Management;  Laterality: Bilateral;  Bilateral Lumbar Medial Branch Blocks L3, L4, L5    [id number]  [id number]    Arrive @ 0930; NO Sedation; ASA; Check BG    EPIDURAL STEROID INJECTION N/A 8/7/2019     Procedure: Injection, Steroid, Epidural Cervical;  Surgeon: Dariel Doan Jr., MD;  Location: [facility];  Service: Pain Management;  Laterality: N/A;  Cervical Epidural Steroid Injection C7-T1    10555    Arrive @ 1115; Last ASA 7/30; Check BG    NO PAST SURGERIES         Review of patient's allergies indicates:   Allergen Reactions    Tomato (solanum lycopersicum) Hives    Naproxen Hives    Shrimp Other (See Comments)       Current Facility-Administered Medications on File Prior to Encounter   Medication    0.9%  NaCl infusion     Current Outpatient Medications on File Prior to Encounter   Medication Sig    acetaminophen-codeine 300-30mg (TYLENOL #3) 300-30 mg Tab Take 1 tablet by mouth every 6 (six) hours as needed.    ascorbic acid, vitamin C, (VITAMIN C) 500 MG tablet Take 500 mg by mouth once daily.    aspirin (ECOTRIN) 81 MG EC tablet Take 81 mg by mouth once daily.    atorvastatin (LIPITOR) 40 MG tablet Take 1 tablet (40 mg total) by mouth once daily.    diltiaZEM (CARTIA XT) 240 MG 24 hr capsule Take 1 capsule (240 mg total) by mouth once daily.    gabapentin (NEURONTIN) 600 MG tablet Take 1 tablet (600 mg total) by mouth 3 (three) times daily.    lisinopril-hydrochlorothiazide (PRINZIDE,ZESTORETIC) 20-25 mg Tab Take 1 tablet by mouth once daily.    nitrofurantoin, macrocrystal-monohydrate, (MACROBID) 100 MG capsule Take 1 capsule (100 mg total) by mouth 2 (two) times daily. for 7 days    vitamin D (VITAMIN D3) 1000 units Tab Take 1,000 Units by mouth once daily.    ADVAIR DISKUS 250-50 mcg/dose diskus inhaler Controller    albuterol (PROAIR HFA) 90 mcg/actuation inhaler Inhale 2 puffs into the lungs every 6 (six) hours as needed for Wheezing. Rescue    blood pressure test kit-large Kit 1 Device by Misc.(Non-Drug; Combo Route) route 2 (two) times daily.    blood sugar diagnostic Strp To check BG 2 times daily, to use with insurance preferred meter    blood-glucose meter kit To check  BG 2 times daily, to use with insurance preferred meter    COLCRYS 0.6 mg tablet TAKE 2 TABLETS BY MOUTH BY MOUTH AT  THE  FIRST  SIGN  OF  GOUT  THEN  1  TAB  1  HOUR  LATER    diclofenac sodium (VOLTAREN) 1 % Gel APPLY  2 GRAMS  TOPICALLY TO AFFECTED AREA ONCE DAILY    FLUAD 0417-9584, 65 YR UP,,PF, 45 mcg (15 mcg x 3)/0.5 mL Syrg ADM 0.5ML IM UTD    FLUZONE HIGH-DOSE 2017-18, PF, 180 mcg/0.5 mL vaccine ADM 0.5ML IM UTD    lancets Misc To check BG 2 times daily, to use with insurance preferred meter    sulfamethoxazole-trimethoprim 800-160mg (BACTRIM DS) 800-160 mg Tab Take 1 tablet by mouth 2 (two) times daily. for 3 days    [DISCONTINUED] nystatin (MYCOSTATIN) cream APPLY   TOPICALLY TO AFFECTED AREA TWICE DAILY     Family History     Problem Relation (Age of Onset)    Diabetes Paternal Grandfather    Heart disease Paternal Grandfather    Hypertension     No Known Problems Mother, Father, Sister, Brother, Maternal Aunt, Maternal Uncle, Paternal Aunt, Paternal Uncle, Maternal Grandmother, Maternal Grandfather, Paternal Grandmother        Tobacco Use    Smoking status: Never Smoker    Smokeless tobacco: Never Used   Substance and Sexual Activity    Alcohol use: Yes     Alcohol/week: 0.0 standard drinks     Comment: occacionally    Drug use: No    Sexual activity: Not Currently     Review of Systems   Constitutional: Positive for activity change, appetite change and fatigue. Negative for chills, diaphoresis and fever.   HENT: Negative for congestion, rhinorrhea, sore throat and trouble swallowing.    Eyes: Negative for photophobia and visual disturbance.   Respiratory: Negative for cough and shortness of breath.    Cardiovascular: Negative for chest pain, palpitations and leg swelling.   Gastrointestinal: Negative for abdominal distention, abdominal pain, constipation, diarrhea, nausea and vomiting.   Genitourinary: Negative for discharge, dysuria and hematuria.        Urinary incontinence and  difficulty urinating   Musculoskeletal: Positive for arthralgias, back pain and gait problem. Negative for myalgias, neck pain and neck stiffness.   Skin: Positive for wound (surgical incision Lt hand). Negative for rash.   Neurological: Positive for weakness (upper lower extremities). Negative for syncope, speech difficulty, numbness and headaches.   Psychiatric/Behavioral: Negative for confusion.     Objective:     Vital Signs (Most Recent):  Temp: 98.7 °F (37.1 °C) (02/26/20 2338)  Pulse: 101 (02/26/20 2338)  BP: (!) 105/55 (02/26/20 2338)  SpO2: (!) 92 % (02/26/20 2338) Vital Signs (24h Range):  Temp:  [98.7 °F (37.1 °C)-98.8 °F (37.1 °C)] 98.7 °F (37.1 °C)  Pulse:  [100-107] 101  SpO2:  [92 %-99 %] 92 %  BP: (105-129)/(55-61) 105/55        There is no height or weight on file to calculate BMI.    Physical Exam   Constitutional: He is oriented to person, place, and time. No distress.   HENT:   Head: Normocephalic and atraumatic.   Eyes: Pupils are equal, round, and reactive to light. EOM are normal. No scleral icterus.   Neck: Normal range of motion. Neck supple. No JVD present.   Cardiovascular: Normal rate and regular rhythm.   No murmur heard.  Pulmonary/Chest: Effort normal. No respiratory distress. He has no wheezes. He has no rales.   Abdominal: Soft. He exhibits no distension. There is no tenderness. There is no guarding.   Musculoskeletal: He exhibits edema (trace).   Proximal muscle weakness of lower extremities bilateral. Preserved muscle strength at level of foot. Proximal muscle weakness of upper extremities bilaterally with decreased right  strength. Pain at multiple joints of upper and lower extremities.   Neurological: He is alert and oriented to person, place, and time. No sensory deficit. He exhibits normal muscle tone.   Straight leg test negative bilaterally   Skin: Skin is warm and dry. Capillary refill takes less than 2 seconds. He is not diaphoretic.   Psychiatric: He has a normal mood  and affect.         CRANIAL NERVES     CN III, IV, VI   Pupils are equal, round, and reactive to light.  Extraocular motions are normal.        Significant Labs:   CBC:   Recent Labs   Lab 02/26/20  1857   WBC 17.69*   HGB 11.5*   HCT 37.0*        CMP:   Recent Labs   Lab 02/26/20 1857      K 4.4      CO2 22*   *   BUN 66*   CREATININE 2.2*   CALCIUM 9.6   PROT 8.1   ALBUMIN 2.6*   BILITOT 2.3*   ALKPHOS 120   *   *   ANIONGAP 13   EGFRNONAA 29.7*     Urine Studies:   Recent Labs   Lab 02/26/20  1858   COLORU [place]   APPEARANCEUA Clear   PHUR 5.0   SPECGRAV 1.015   PROTEINUA Negative   GLUCUA Negative   KETONESU Negative   BILIRUBINUA Negative   OCCULTUA 2+*   NITRITE Negative   LEUKOCYTESUR Negative   RBCUA 1   WBCUA 1   BACTERIA Few*   SQUAMEPITHEL 0   HYALINECASTS 2*     All pertinent labs within the past 24 hours have been reviewed.    Significant Imaging: I have reviewed all pertinent imaging results/findings within the past 24 hours.

## 2020-02-27 NOTE — ED TRIAGE NOTES
Pt with Hx of gout and carpal tunnel seen at [facility] last night and was discharged for UTI with Macrobid for treatment returned to ED via EMS with CC of generalized weakness and body pain 10/10. Pt reports taken tylenol at  3 pm without improvement.  Daughter at bedside.     Patient identifiers verified and correct for Genekacie Liuansley Gutierrez.    LOC: The patient is awake, alert and oriented x 4. Pt is speaking appropriately, no slurred speech.  APPEARANCE: Patient resting comfortably and in no acute distress. Pt is clean and well groomed. No JVD visible. Pt reports pain level of 10/10.  SKIN: Skin is warm dry and intact, and color is consistent with ethnicity. No tenting observed and capillary refill <3 seconds. No clubbing noted to nail beds. No breakdown or brusing visible and mucus membranes moist and acyanotic.  MUSCULOSKELETAL: Generalized weakness   RESPIRATORY: Airway is open and patent. Respirations-unlabored, regular rate, equal bilaterally on inspiration and expiration. No accessory muscle use noted. Lungs clear to auscultation in all fields bilaterally anterior and posterior.   CARDIAC: Patient has regular heart rate and rhythm.  No peripheral edema noted, and patient has no c/o chest pain.  ABDOMEN: Soft and non-tender to palpation with no distention noted. Normoactive bowel sounds X4 quadrants. Pt has no complaints of abnormal bowel movements. Pt reports normal appetite.   NEUROLOGIC: Eyes open spontaneously and facial expression symmetrical. Pt behavior appropriate to situation, and pt follows commands.  Pt reports sensation present in all extremities when touched with a finger.  PERRLA  : Has complaints of frequency, burning, urgency. Denies blood in the urine.

## 2020-02-27 NOTE — ED NOTES
"Pt unable to void in urinal provided by RN. Offered a straight cath, Pt refused and stated, "They never do it right, I aint doing it"   "

## 2020-02-27 NOTE — PT/OT/SLP PROGRESS
Physical Therapy      Patient Name:  Bj Pate Jr.   MRN:  [id number]    Patient not seen today secondary to Unavailable (Comment)(nurse reports pt out for procedure). 2nd attempt. Will follow-up 2/28/2020    Kaleb Levin, PT

## 2020-02-27 NOTE — CONSULTS
[facility]  Rheumatology  Consult Note    Patient Name: Bj Pate Jr.  MRN: [id number]  Admission Date: 2/26/2020  Hospital Length of Stay: 0 days  Code Status: Full Code   Attending Provider: Guilherme Anderson MD  Primary Care Physician: Azikiwe K Lombard, MD  Principal Problem:Polyarthropathy or polyarthritis of multiple sites    Consults  Subjective:     HPI: Mr. Pate is a 71 yo male with past medical history of morbid obesity, COPD, Gout, HTN, CVA with no residual deficits, CKD(baseline Scr 1.5), and HILARIO on CPAP who presents to [facility] on 2/27 with [facility] complaint of generalized weakness which has been progressively worsening over the past week. The patient says he had carpal tunnel release surgery on 2/19 when it seems all of his symptoms started. His weakness began with difficulty getting out of a chair and moving around and now he cannot move at all. He also had pain and swelling in multiple joints--particularly the wrist, small joints of bilateral hands, elbows, knees, and ankles. He has significant pain, redness, and swelling in his left wrist where he had surgery. He has a history of gout--he says he gets flares about once every month usually monoarticular in his big toe. He has never had arthrocentesis performed and says his gout was a clinical diagnosis. Other symptoms has been having are decreased urine output characterized by feeling he has to urinate but cant with some occasional incontinence, fatigue, and decreased PO intake.  He denies any sick contacts. He denies any diarrhea, nausea, vomiting, fevers, chills, shortness of breath, loss of sensation anywhere, dysuria, headaches, confusion, myalgias.     Of note he presented to the ED on 2/24 for urinary incontinence and although UA not consistent with UTI was sent home with course of bactrim. He says he did not take this medication. He represented on 2/25 again but this time for diffuse arthralgias and generalized weakness but was  sent home again after initial workup was negative.    On arrival here, he was febrile with Tm 100.7, tachycardic in low 100s, SBPs on softer side as low as 90s, satting normally on room air. His labs were significant for elevated WBC 17K, H/H 10.1/32.7, CMP with low albumin 2.2, AST/ and 133, Tbili 2.3, elevated inflammatory markers wit ESR >120 and high-sensitivity, , CK 2681, uric acid 11.4, normal TSH, UA with 2+ blood but 1 RBC. They obtained imaging of his head and sine which showed no acute abnormality. They also obtained abdominal ultrasound which showed hepatosteatosis.     Past Medical History:   Diagnosis Date    Anticoagulant long-term use     Basal ganglia hemorrhage     Left basal ganglia hemorrhage with resultant right-sided hemiparesis which has resolved.     Benign hypertension with CKD (chronic kidney disease) stage III      Cataract     Chronic idiopathic gout of multiple sites     Chronic kidney disease, stage 3     COPD (chronic obstructive pulmonary disease)     Erectile dysfunction     Gout     Hemorrhoids without complication     Hyperlipidemia     Morbid obesity     Obstructive sleep apnea on CPAP     Stroke 2016, 2006    Thalamic infarct, acute (right) 1/2016    Type 2 DM with CKD stage 3 and hypertension     On pravastatin for cardiovascular protection.        Past Surgical History:   Procedure Laterality Date    CARPAL TUNNEL RELEASE Left 2/19/2020    Procedure: RELEASE, CARPAL TUNNEL LEFT;  Surgeon: Maria Luisa Mccurdy MD;  Location[facility];  Service: Orthopedics;  Laterality: Left;    EPIDURAL STEROID INJECTION N/A 5/2/2019    Procedure: Injection, Steroid, Epidural Cervical;  Surgeon: Dariel Doan Jr., MD;  Location: [facility];  Service: Pain Management;  Laterality: N/A;  Cervical Epidural Steroid Injection     62015    Arrive @ 1130; ASA; Check BG    EPIDURAL STEROID INJECTION Bilateral 5/29/2019    Procedure: Lumbar Medial Branch Blocks;   Surgeon: Dariel Doan Jr., MD;  Location: [facility];  Service: Pain Management;  Laterality: Bilateral;  Bilateral Lumbar Medial Branch Blocks L3, L4, L5    72145  [id number]    Attive @ 1030 (11 arrival request); NO Sedation; ASA; Check BG    EPIDURAL STEROID INJECTION Bilateral 7/3/2019    Procedure: Lumbar Medial Branch Blocks;  Surgeon: Dariel Doan Jr., MD;  Location: [facility] END[facility];  Service: Pain Management;  Laterality: Bilateral;  Bilateral Lumbar Medial Branch Blocks L3, L4, L5    72010  [id number]    Arrive @ 0930; NO Sedation; ASA; Check BG    EPIDURAL STEROID INJECTION N/A 8/7/2019    Procedure: Injection, Steroid, Epidural Cervical;  Surgeon: Dariel Doan Jr., MD;  Location: [facility];  Service: Pain Management;  Laterality: N/A;  Cervical Epidural Steroid Injection C7-T1    30930    Arrive @ 1115; Last ASA 7/30; Check BG    NO PAST SURGERIES         Immunization History   Administered Date(s) Administered    Influenza 10/22/2012, 02/28/2014    Influenza - High Dose - PF (65 years and older) 10/23/2017    Influenza - Quadrivalent 09/23/2015    Influenza - Quadrivalent - PF (6 months and older) 12/03/2016    Influenza - Trivalent - Adjuvanted - PF 10/10/2018, 09/12/2019    Influenza Split 10/22/2012, 02/28/2014    Pneumococcal Conjugate - 13 Valent 02/19/2016, 10/10/2018    Pneumococcal Polysaccharide - 23 Valent 03/07/2017, 09/12/2019    Tdap 02/19/2016       Review of patient's allergies indicates:   Allergen Reactions    Tomato (solanum lycopersicum) Hives    Naproxen Hives    Shrimp Other (See Comments)     Current Facility-Administered Medications   Medication Frequency    0.9%  NaCl infusion Continuous    acetaminophen tablet 650 mg Q4H PRN    albuterol-ipratropium 2.5 mg-0.5 mg/3 mL nebulizer solution 3 mL Q4H PRN    atorvastatin tablet 40 mg Daily    dextrose 10% (D10W) Bolus PRN    dextrose 10% (D10W) Bolus PRN    diltiaZEM 24 hr capsule 240 mg Daily    fluticasone  furoate-vilanterol 100-25 mcg/dose diskus inhaler 1 puff Daily    gabapentin capsule 600 mg TID    glucagon (human recombinant) injection 1 mg PRN    glucose chewable tablet 16 g PRN    glucose chewable tablet 24 g PRN    heparin (porcine) injection 5,000 Units Q8H    insulin aspart U-100 pen 0-5 Units QID (AC + HS) PRN    ondansetron injection 4 mg Q8H PRN    oxyCODONE immediate release tablet 10 mg Q6H PRN    oxyCODONE immediate release tablet 5 mg Q6H PRN    senna-docusate 8.6-50 mg per tablet 1 tablet BID    sodium chloride 0.9% flush 10 mL PRN     Facility-Administered Medications Ordered in Other Encounters   Medication Frequency    0.9%  NaCl infusion Continuous     Family History     Problem Relation (Age of Onset)    Diabetes Paternal Grandfather    Heart disease Paternal Grandfather    Hypertension     No Known Problems Mother, Father, Sister, Brother, Maternal Aunt, Maternal Uncle, Paternal Aunt, Paternal Uncle, Maternal Grandmother, Maternal Grandfather, Paternal Grandmother        Tobacco Use    Smoking status: Never Smoker    Smokeless tobacco: Never Used   Substance and Sexual Activity    Alcohol use: Yes     Alcohol/week: 0.0 standard drinks     Comment: occacionally    Drug use: No    Sexual activity: Not Currently     Review of Systems   Constitutional: Positive for activity change, appetite change and fatigue. Negative for chills and fever.   HENT: Negative for rhinorrhea, sinus pain, sore throat and trouble swallowing.    Eyes: Negative for photophobia and visual disturbance.   Respiratory: Negative for cough, shortness of breath and wheezing.    Cardiovascular: Negative for chest pain, palpitations and leg swelling.   Gastrointestinal: Positive for constipation. Negative for abdominal pain, diarrhea, nausea and vomiting.   Genitourinary: Positive for decreased urine volume and difficulty urinating. Negative for dysuria, frequency, hematuria and urgency.   Musculoskeletal:  Positive for arthralgias, gait problem and joint swelling.   Skin: Negative for pallor and rash.   Neurological: Positive for weakness. Negative for light-headedness, numbness and headaches.   Psychiatric/Behavioral: Negative for agitation and confusion.     Objective:     Vital Signs (Most Recent):  Temp: 98.4 °F (36.9 °C) (02/27/20 0748)  Pulse: 99 (02/27/20 0919)  Resp: 18 (02/27/20 0919)  BP: 104/60 (02/27/20 0748)  SpO2: 96 % (02/27/20 0748)  O2 Device (Oxygen Therapy): room air (02/27/20 0434) Vital Signs (24h Range):  Temp:  [98.4 °F (36.9 °C)-100.7 °F (38.2 °C)] 98.4 °F (36.9 °C)  Pulse:  [] 99  Resp:  [18-28] 18  SpO2:  [92 %-99 %] 96 %  BP: ()/(55-68) 104/60     Weight: (!) 148 kg (326 lb 4.5 oz) (02/27/20 0159)  Body mass index is 48.18 kg/m².  Body surface area is 2.68 meters squared.      Intake/Output Summary (Last 24 hours) at 2/27/2020 1300  Last data filed at 2/27/2020 0400  Gross per 24 hour   Intake 1500 ml   Output 1500 ml   Net 0 ml       Physical Exam   Constitutional: He is oriented to person, place, and time and well-developed, well-nourished, and in no distress.   HENT:   Head: Normocephalic and atraumatic.   Eyes: Conjunctivae and EOM are normal. Pupils are equal, round, and reactive to light. No scleral icterus.   Neck: Normal range of motion. Neck supple.   Cardiovascular: Normal rate, regular rhythm, normal heart sounds and intact distal pulses.  Exam reveals no gallop and no friction rub.    No murmur heard.  Pulmonary/Chest: Effort normal and breath sounds normal. No respiratory distress. He has no wheezes. He has no rales.   Abdominal: Soft. Bowel sounds are normal. He exhibits no distension. There is no tenderness.   Lymphadenopathy:     He has no cervical adenopathy.   Neurological: He is alert and oriented to person, place, and time. He has normal reflexes.   He has bilateral 1/5 strength in bilateral upper and lower --exam possibly limited by pain  Can wiggle toes    Skin: Skin is warm and dry.     Musculoskeletal: Normal range of motion. He exhibits no edema.   Polyarticular synovitis characterized by warmth, swelling, and tenderness involving the joints bilateral hands, knees, ankles, and feet           Significant Labs:  CBC:   Recent Labs   Lab 02/26/20 1857 02/27/20 0441   WBC 17.69* 18.22*   HGB 11.5* 10.1*   HCT 37.0* 32.7*    255     CMP:   Recent Labs   Lab 02/27/20 0441      CALCIUM 8.9   ALBUMIN 2.2*   PROT 7.1      K 4.2   CO2 24      BUN 68*   CREATININE 2.0*   ALKPHOS 114   *   *   BILITOT 2.0*     CPK:   Recent Labs   Lab 02/27/20 0441   CPK 2681*     CRP:   Recent Labs   Lab 02/26/20 1857   .9*     ESR:   Recent Labs   Lab 02/26/20 1857   SEDRATE >120*     Uric Acid:   Recent Labs   Lab 02/27/20 0441   URICACID 11.4*     Urinalysis:   Recent Labs   Lab 02/26/20 1858   COLORU [place]   SPECGRAV 1.015   PHUR 5.0   PROTEINUA Negative   BACTERIA Few*   NITRITE Negative   LEUKOCYTESUR Negative   HYALINECASTS 2*       Significant Imaging:  Imaging results within the past 24 hours have been reviewed.     Right knee synovial fluid:              Assessment/Plan:     * Polyarthropathy or polyarthritis of multiple sites  Mr. rodriguez is a 71 yo male with history of gout(not on allopurinol) which usually flares 1x/month in one joint, often his great toe, who presents to [facility] 2/26 with chief complaint of multiple arthralgias, weakness involving upper and lower extremities, fatigue, and decreased PO intake. His ESR and high-sensitivity CRP are significantly elevated at >120 and 416 respectively. He also has elevated uric acid. He has multiple joints--primarily his hands, feet, knees, and ankles which are warm, swollen, and exquisitely tender. He is s/p arthrocentesis of his right knee which revealed monosodium urate crystals(pictured above) consistent with polyarticular gout. He has been developing fevers since admission  which can occur in the setting of gout. It is possible his symptoms of weakness could be related to pain. It is unclear why his CK levels are elevated but low suspicion for inflammatory myopathy given the acuity of his presentation.     Recommendations:  -Recommend IV solumedrol 80 mg daily x 3 days. Pending response will determine prednisone dose thereafter.   -Once sCr improves can start colchicine daily   -Follow up synovial fluid studies including cell count and cultures   -Once this acute attack resolves, patient would ideally be started on allopurinol   -Avoid NSAIDs in the setting of CKD  -Patient on thiazide diuretic at home. Would consider stopping this to decrease frequency of gout flares in the future.   -Agree with IV fluids for KISHA and elevated CK            Thank you for your consult. I will follow-up with patient. Please contact us if you have any additional questions.    Kamran Rodriguez MD  Rheumatology  [facility]

## 2020-02-27 NOTE — PT/OT/SLP PROGRESS
Occupational Therapy      Patient Name:  Bj Pate Jr.   MRN:  [id number]    OT orders received and acknowledged. Patient not seen today secondary to pt MALIK for stress test in AM. OT unable to return in PM for 2nd attempt.  Will follow-up 2/28.    Leticia Gatica, OT  2/27/2020

## 2020-02-27 NOTE — HPI
Mr. Pate is a 71 yo male with past medical history of morbid obesity, COPD, Gout, HTN, CVA with no residual deficits, CKD(baseline Scr 1.5), and HILARIO on CPAP who presents to [facility] on 2/27 with [facility] complaint of generalized weakness which has been progressively worsening over the past week. The patient says he had carpal tunnel release surgery on 2/19 when it seems all of his symptoms started. His weakness began with difficulty getting out of a chair and moving around and now he cannot move at all. He also had pain and swelling in multiple joints--particularly the wrist, small joints of bilateral hands, elbows, knees, and ankles. He has significant pain, redness, and swelling in his left wrist where he had surgery. He has a history of gout--he says he gets flares about once every month usually monoarticular in his big toe. He has never had arthrocentesis performed and says his gout was a clinical diagnosis. Other symptoms has been having are decreased urine output characterized by feeling he has to urinate but cant with some occasional incontinence, fatigue, and decreased PO intake.  He denies any sick contacts. He denies any diarrhea, nausea, vomiting, fevers, chills, shortness of breath, loss of sensation anywhere, dysuria, headaches, confusion, myalgias.     Of note he presented to the ED on 2/24 for urinary incontinence and although UA not consistent with UTI was sent home with course of bactrim. He says he did not take this medication. He represented on 2/25 again but this time for diffuse arthralgias and generalized weakness but was sent home again after initial workup was negative.    On arrival here, he was febrile with Tm 100.7, tachycardic in low 100s, SBPs on softer side as low as 90s, satting normally on room air. His labs were significant for elevated WBC 17K, H/H 10.1/32.7, CMP with low albumin 2.2, AST/ and 133, Tbili 2.3, elevated inflammatory markers wit ESR >120 and high-sensitivity, ,  CK 2681, uric acid 11.4, normal TSH, UA with 2+ blood but 1 RBC. They obtained imaging of his head and sine which showed no acute abnormality. They also obtained abdominal ultrasound which showed hepatosteatosis.

## 2020-02-27 NOTE — ASSESSMENT & PLAN NOTE
- Creatinine 2.2  (baseline 1.5)  - Likely multifactorial partially due to dehydration in setting of recent procedure and poor po intake as well as systemic inflammatory process and now patient with urinary retention and incontinence.  - CT abdomen unremarkable  - bolused 1.5L in ED  - start NS 150ml/h continuous  - monitor strict I/Os  - Insert Rivera  - repeat labs in am

## 2020-02-27 NOTE — PT/OT/SLP PROGRESS
Physical Therapy      Patient Name:  Bj Pate Jr.   MRN:  [id number]    Patient not seen today secondary to Out of room for US per nurse. Will follow-up 02/27/2020 as able.    Kaleb Levin PT

## 2020-02-27 NOTE — SUBJECTIVE & OBJECTIVE
Past Medical History:   Diagnosis Date    Anticoagulant long-term use     Basal ganglia hemorrhage     Left basal ganglia hemorrhage with resultant right-sided hemiparesis which has resolved.     Benign hypertension with CKD (chronic kidney disease) stage III      Cataract     Chronic idiopathic gout of multiple sites     Chronic kidney disease, stage 3     COPD (chronic obstructive pulmonary disease)     Erectile dysfunction     Gout     Hemorrhoids without complication     Hyperlipidemia     Morbid obesity     Obstructive sleep apnea on CPAP     Stroke 2016, 2006    Thalamic infarct, acute (right) 1/2016    Type 2 DM with CKD stage 3 and hypertension     On pravastatin for cardiovascular protection.        Past Surgical History:   Procedure Laterality Date    CARPAL TUNNEL RELEASE Left 2/19/2020    Procedure: RELEASE, CARPAL TUNNEL LEFT;  Surgeon: Maria Luisa Mccurdy MD;  Location: [facility] OR;  Service: Orthopedics;  Laterality: Left;    EPIDURAL STEROID INJECTION N/A 5/2/2019    Procedure: Injection, Steroid, Epidural Cervical;  Surgeon: Dariel Doan Jr., MD;  Location: [facility] ENDO;  Service: Pain Management;  Laterality: N/A;  Cervical Epidural Steroid Injection     [id number]    Arrive @ 1130; ASA; Check BG    EPIDURAL STEROID INJECTION Bilateral 5/29/2019    Procedure: Lumbar Medial Branch Blocks;  Surgeon: Dariel Doan Jr., MD;  Location: [facility] ENDO;  Service: Pain Management;  Laterality: Bilateral;  Bilateral Lumbar Medial Branch Blocks L3, L4, L5    93095  34700    Attive @ 1030 (11 arrival request); NO Sedation; ASA; Check BG    EPIDURAL STEROID INJECTION Bilateral 7/3/2019    Procedure: Lumbar Medial Branch Blocks;  Surgeon: Dariel Doan Jr., MD;  Location: [facility] ENDO;  Service: Pain Management;  Laterality: Bilateral;  Bilateral Lumbar Medial Branch Blocks L3, L4, L5    58601  32426    Arrive @ 0930; NO Sedation; ASA; Check BG    EPIDURAL STEROID INJECTION N/A 8/7/2019     Procedure: Injection, Steroid, Epidural Cervical;  Surgeon: Dariel Doan Jr., MD;  Location: [facility];  Service: Pain Management;  Laterality: N/A;  Cervical Epidural Steroid Injection C7-T1    19579    Arrive @ 1115; Last ASA 7/30; Check BG    NO PAST SURGERIES         Immunization History   Administered Date(s) Administered    Influenza 10/22/2012, 02/28/2014    Influenza - High Dose - PF (65 years and older) 10/23/2017    Influenza - Quadrivalent 09/23/2015    Influenza - Quadrivalent - PF (6 months and older) 12/03/2016    Influenza - Trivalent - Adjuvanted - PF 10/10/2018, 09/12/2019    Influenza Split 10/22/2012, 02/28/2014    Pneumococcal Conjugate - 13 Valent 02/19/2016, 10/10/2018    Pneumococcal Polysaccharide - 23 Valent 03/07/2017, 09/12/2019    Tdap 02/19/2016       Review of patient's allergies indicates:   Allergen Reactions    Tomato (solanum lycopersicum) Hives    Naproxen Hives    Shrimp Other (See Comments)     Current Facility-Administered Medications   Medication Frequency    0.9%  NaCl infusion Continuous    acetaminophen tablet 650 mg Q4H PRN    albuterol-ipratropium 2.5 mg-0.5 mg/3 mL nebulizer solution 3 mL Q4H PRN    atorvastatin tablet 40 mg Daily    dextrose 10% (D10W) Bolus PRN    dextrose 10% (D10W) Bolus PRN    diltiaZEM 24 hr capsule 240 mg Daily    fluticasone furoate-vilanterol 100-25 mcg/dose diskus inhaler 1 puff Daily    gabapentin capsule 600 mg TID    glucagon (human recombinant) injection 1 mg PRN    glucose chewable tablet 16 g PRN    glucose chewable tablet 24 g PRN    heparin (porcine) injection 5,000 Units Q8H    insulin aspart U-100 pen 0-5 Units QID (AC + HS) PRN    ondansetron injection 4 mg Q8H PRN    oxyCODONE immediate release tablet 10 mg Q6H PRN    oxyCODONE immediate release tablet 5 mg Q6H PRN    senna-docusate 8.6-50 mg per tablet 1 tablet BID    sodium chloride 0.9% flush 10 mL PRN     Facility-Administered Medications  Ordered in Other Encounters   Medication Frequency    0.9%  NaCl infusion Continuous     Family History     Problem Relation (Age of Onset)    Diabetes Paternal Grandfather    Heart disease Paternal Grandfather    Hypertension     No Known Problems Mother, Father, Sister, Brother, Maternal Aunt, Maternal Uncle, Paternal Aunt, Paternal Uncle, Maternal Grandmother, Maternal Grandfather, Paternal Grandmother        Tobacco Use    Smoking status: Never Smoker    Smokeless tobacco: Never Used   Substance and Sexual Activity    Alcohol use: Yes     Alcohol/week: 0.0 standard drinks     Comment: occacionally    Drug use: No    Sexual activity: Not Currently     Review of Systems   Constitutional: Positive for activity change, appetite change and fatigue. Negative for chills and fever.   HENT: Negative for rhinorrhea, sinus pain, sore throat and trouble swallowing.    Eyes: Negative for photophobia and visual disturbance.   Respiratory: Negative for cough, shortness of breath and wheezing.    Cardiovascular: Negative for chest pain, palpitations and leg swelling.   Gastrointestinal: Positive for constipation. Negative for abdominal pain, diarrhea, nausea and vomiting.   Genitourinary: Positive for decreased urine volume and difficulty urinating. Negative for dysuria, frequency, hematuria and urgency.   Musculoskeletal: Positive for arthralgias, gait problem and joint swelling.   Skin: Negative for pallor and rash.   Neurological: Positive for weakness. Negative for light-headedness, numbness and headaches.   Psychiatric/Behavioral: Negative for agitation and confusion.     Objective:     Vital Signs (Most Recent):  Temp: 98.4 °F (36.9 °C) (02/27/20 0748)  Pulse: 99 (02/27/20 0919)  Resp: 18 (02/27/20 0919)  BP: 104/60 (02/27/20 0748)  SpO2: 96 % (02/27/20 0748)  O2 Device (Oxygen Therapy): room air (02/27/20 0434) Vital Signs (24h Range):  Temp:  [98.4 °F (36.9 °C)-100.7 °F (38.2 °C)] 98.4 °F (36.9 °C)  Pulse:   [] 99  Resp:  [18-28] 18  SpO2:  [92 %-99 %] 96 %  BP: ()/(55-68) 104/60     Weight: (!) 148 kg (326 lb 4.5 oz) (02/27/20 0159)  Body mass index is 48.18 kg/m².  Body surface area is 2.68 meters squared.      Intake/Output Summary (Last 24 hours) at 2/27/2020 1300  Last data filed at 2/27/2020 0400  Gross per 24 hour   Intake 1500 ml   Output 1500 ml   Net 0 ml       Physical Exam   Constitutional: He is oriented to person, place, and time and well-developed, well-nourished, and in no distress.   HENT:   Head: Normocephalic and atraumatic.   Eyes: Conjunctivae and EOM are normal. Pupils are equal, round, and reactive to light. No scleral icterus.   Neck: Normal range of motion. Neck supple.   Cardiovascular: Normal rate, regular rhythm, normal heart sounds and intact distal pulses.  Exam reveals no gallop and no friction rub.    No murmur heard.  Pulmonary/Chest: Effort normal and breath sounds normal. No respiratory distress. He has no wheezes. He has no rales.   Abdominal: Soft. Bowel sounds are normal. He exhibits no distension. There is no tenderness.   Lymphadenopathy:     He has no cervical adenopathy.   Neurological: He is alert and oriented to person, place, and time. He has normal reflexes.   He has bilateral 1/5 strength in bilateral upper and lower --exam possibly limited by pain  Can wiggle toes   Skin: Skin is warm and dry.     Musculoskeletal: Normal range of motion. He exhibits no edema.   Polyarticular synovitis characterized by warmth, swelling, and tenderness involving the joints bilateral hands, knees, ankles, and feet           Significant Labs:  CBC:   Recent Labs   Lab 02/26/20  1857 02/27/20  0441   WBC 17.69* 18.22*   HGB 11.5* 10.1*   HCT 37.0* 32.7*    255     CMP:   Recent Labs   Lab 02/27/20  0441      CALCIUM 8.9   ALBUMIN 2.2*   PROT 7.1      K 4.2   CO2 24      BUN 68*   CREATININE 2.0*   ALKPHOS 114   *   *   BILITOT 2.0*     CPK:    Recent Labs   Lab 02/27/20  0441   CPK 2681*     CRP:   Recent Labs   Lab 02/26/20  1857   .9*     ESR:   Recent Labs   Lab 02/26/20  1857   SEDRATE >120*     Uric Acid:   Recent Labs   Lab 02/27/20  0441   URICACID 11.4*     Urinalysis:   Recent Labs   Lab 02/26/20  1858   COLORU [place]   SPECGRAV 1.015   PHUR 5.0   PROTEINUA Negative   BACTERIA Few*   NITRITE Negative   LEUKOCYTESUR Negative   HYALINECASTS 2*       Significant Imaging:  Imaging results within the past 24 hours have been reviewed.     Right knee synovial fluid:

## 2020-02-27 NOTE — PLAN OF CARE
02/27/20 1454   Discharge Assessment   Assessment Type Discharge Planning Assessment   Confirmed/corrected address and phone number on facesheet? Yes   Assessment information obtained from? Patient   Expected Length of Stay (days) 3   Communicated expected length of stay with patient/caregiver yes   Prior to hospitilization cognitive status: Alert/Oriented   Prior to hospitalization functional status: Independent   Current cognitive status: Alert/Oriented   Lives With alone   Is patient able to care for self after discharge? Unable to determine at this time (comments)   Patient's perception of discharge disposition rehab facility   Patient currently being followed by outpatient case management? No   Patient currently receives any other outside agency services? No   Equipment Currently Used at Home none   Do you have any problems affording any of your prescribed medications? No   Is the patient taking medications as prescribed? yes   Does the patient have transportation home? Yes   Transportation Anticipated family or friend will provide   Does the patient receive services at the [facility]? No   Discharge Plan A Rehab   Discharge Plan B Home Health   Patient/Family in Agreement with Plan yes   Does the patient have transportation to healthcare appointments? Yes     SW met with pt at bedside to conduct discharge planning assessment. Pt verified information on FS. Pt lives alone in a one story home. Pt denied use of illicit drugs. Pt denied use of illicit drugs or alcohol. Family reports they believe pt would benefit from inpatient rehab upon dc from hospital.    Azikiwe K Lombard, MD    [facility] [address]  [address] [place] [address]  Phone: [phone] Fax: [phone]    [id number] [address] AT [address]  [place] [facility] [place] [address]  Phone: [phone] Fax: [phone]    [facility]  [address] - [place], [place] - [address] [facility] [address] [facility] [address]  [address]  [place] [address]  Phone: [phone] Fax: [phone]    [facility]  [address] [facility] [address]  Phone: [phone] Fax: [phone]    [facility]  [address] [facility] [address]  [facility] [address]  Phone: [phone] Fax: [phone]    Claribel Mehta LMSW [facility]  [address]

## 2020-02-27 NOTE — PLAN OF CARE
POC reviewed with pt, pt verbalized understanding. Pt A/O x 4. GCS of 15. Pt reports no n/v/d, HA, or SOB. Pt states pain only when extremities are moved. Med team made aware of asymptomatic lowered BP, nursing staff advised to make no changes to care, and to monitor the pts BP being that his is tolerating well. Pt has fluids going continually at 150mL/hr. No fall or acute event occurred on this shift. Pt belongings are within reach, bed rails x 2, bed in lowest position, HOB elevated. Being that the pt has severe pain when limbs/digits are moved, I placed the call light in their hand and pt feels/made aware of how to alert staff if need be. NADN.

## 2020-02-27 NOTE — H&P
[facility] Medicine  History & Physical    Patient Name: Bj Pate Jr.  MRN: [id number]  Admission Date: 2/26/2020  Attending Physician: Boston Lovett DO   Primary Care Provider: Azikiwe K Lombard, MD    [facility] Medicine Team: [facility] MED  Justin Jaime MD     Patient information was obtained from patient, past medical records and ER records.     Subjective:     Principal Problem:Polyarthropathy or polyarthritis of multiple sites    Chief Complaint:   Chief Complaint   Patient presents with    Fatigue     seen at several hospitals, x4 days, can not move extremities, left knee pain,         HPI: This is a 71yo male with a past medical history of CVA without residual deficit, COPD, HILARIO on CPAP, gout, DDD of Lumbar, Cervical Radiculopathy, and HTN who presents to the ED with chief complaint of worsening weakness and joint pain effecting upper and lower extremities bilaterally. One week ago on 02/19/20 patient underwent left hand carpal release procedure. He reports that since that time he has had gradually progressive worsening of symptoms of upper and lower extremity weakness and joint pain, fatigue, and decreased appetite. Three days ago the patient developed new symptoms of urinary incontinence and so he presented to the ED. Workup was largely unremarkable but he was prescribed Bactrim for slightly abnormal UA in setting of urinary symptoms for presumed UTI. No culture performed. He has not taken this antibiotic. His symptoms continued to worsen and he was seen in the ED last night 12/25/20 and again had unremarkable workup. Today patient reports that his symptoms have significantly worsened and he states he can no longer lift his legs off the bed or his arms off his chest. Sensation intact. Significant pain affecting joints of the hand, elbows, knees, and ankles. Straight leg test negative bilateral. Wiggles toes and plantar/dorsiflexes feet with 5/5 strength. No saddle  anesthesia. Denies neck pain and full range of motion of neck. He is afebrile and hemodynamically stable. WBC 17. Creatinine 2.2 (baseline 1.5). CRP grossly elevated 434. CPK 2343. . CT abdomen unremarkable. He has been admitted to the care of medicine for further evaluation and management. He denies fevers, chills, chest pain, abdominal pain, shortness of breath, cough, nausea, vomiting, or confusion.    Past Medical History:   Diagnosis Date    Anticoagulant long-term use     Basal ganglia hemorrhage     Left basal ganglia hemorrhage with resultant right-sided hemiparesis which has resolved.     Benign hypertension with CKD (chronic kidney disease) stage III      Cataract     Chronic idiopathic gout of multiple sites     Chronic kidney disease, stage 3     COPD (chronic obstructive pulmonary disease)     Erectile dysfunction     Gout     Hemorrhoids without complication     Hyperlipidemia     Morbid obesity     Obstructive sleep apnea on CPAP     Stroke 2016, 2006    Thalamic infarct, acute (right) 1/2016    Type 2 DM with CKD stage 3 and hypertension     On pravastatin for cardiovascular protection.        Past Surgical History:   Procedure Laterality Date    CARPAL TUNNEL RELEASE Left 2/19/2020    Procedure: RELEASE, CARPAL TUNNEL LEFT;  Surgeon: Maria Luisa Mccurdy MD;  Location: [facility] OR;  Service: Orthopedics;  Laterality: Left;    EPIDURAL STEROID INJECTION N/A 5/2/2019    Procedure: Injection, Steroid, Epidural Cervical;  Surgeon: Dariel Doan Jr., MD;  Location: [facility] ENDO;  Service: Pain Management;  Laterality: N/A;  Cervical Epidural Steroid Injection     [id number]    Arrive @ 1130; ASA; Check BG    EPIDURAL STEROID INJECTION Bilateral 5/29/2019    Procedure: Lumbar Medial Branch Blocks;  Surgeon: Dariel Doan Jr., MD;  Location: [facility] ENDO;  Service: Pain Management;  Laterality: Bilateral;  Bilateral Lumbar Medial Branch Blocks L3, L4, L5    [id number]  [id number]    Attive @  1030 (11 arrival request); NO Sedation; ASA; Check BG    EPIDURAL STEROID INJECTION Bilateral 7/3/2019    Procedure: Lumbar Medial Branch Blocks;  Surgeon: Dariel Doan Jr., MD;  Location: [facility] ENDO;  Service: Pain Management;  Laterality: Bilateral;  Bilateral Lumbar Medial Branch Blocks L3, L4, L5    [id number]  [id number]    Arrive @ 0930; NO Sedation; ASA; Check BG    EPIDURAL STEROID INJECTION N/A 8/7/2019    Procedure: Injection, Steroid, Epidural Cervical;  Surgeon: Dariel Doan Jr., MD;  Location: [facility] ENDO;  Service: Pain Management;  Laterality: N/A;  Cervical Epidural Steroid Injection C7-T1    [id number]    Arrive @ 1115; Last ASA 7/30; Check BG    NO PAST SURGERIES         Review of patient's allergies indicates:   Allergen Reactions    Tomato (solanum lycopersicum) Hives    Naproxen Hives    Shrimp Other (See Comments)       Current Facility-Administered Medications on File Prior to Encounter   Medication    0.9%  NaCl infusion     Current Outpatient Medications on File Prior to Encounter   Medication Sig    acetaminophen-codeine 300-30mg (TYLENOL #3) 300-30 mg Tab Take 1 tablet by mouth every 6 (six) hours as needed.    ascorbic acid, vitamin C, (VITAMIN C) 500 MG tablet Take 500 mg by mouth once daily.    aspirin (ECOTRIN) 81 MG EC tablet Take 81 mg by mouth once daily.    atorvastatin (LIPITOR) 40 MG tablet Take 1 tablet (40 mg total) by mouth once daily.    diltiaZEM (CARTIA XT) 240 MG 24 hr capsule Take 1 capsule (240 mg total) by mouth once daily.    gabapentin (NEURONTIN) 600 MG tablet Take 1 tablet (600 mg total) by mouth 3 (three) times daily.    lisinopril-hydrochlorothiazide (PRINZIDE,ZESTORETIC) 20-25 mg Tab Take 1 tablet by mouth once daily.    nitrofurantoin, macrocrystal-monohydrate, (MACROBID) 100 MG capsule Take 1 capsule (100 mg total) by mouth 2 (two) times daily. for 7 days    vitamin D (VITAMIN D3) 1000 units Tab Take 1,000 Units by mouth once daily.    ADVAIR  DISKUS 250-50 mcg/dose diskus inhaler Controller    albuterol (PROAIR HFA) 90 mcg/actuation inhaler Inhale 2 puffs into the lungs every 6 (six) hours as needed for Wheezing. Rescue    blood pressure test kit-large Kit 1 Device by Misc.(Non-Drug; Combo Route) route 2 (two) times daily.    blood sugar diagnostic Strp To check BG 2 times daily, to use with insurance preferred meter    blood-glucose meter kit To check BG 2 times daily, to use with insurance preferred meter    COLCRYS 0.6 mg tablet TAKE 2 TABLETS BY MOUTH BY MOUTH AT  THE  FIRST  SIGN  OF  GOUT  THEN  1  TAB  1  HOUR  LATER    diclofenac sodium (VOLTAREN) 1 % Gel APPLY  2 GRAMS  TOPICALLY TO AFFECTED AREA ONCE DAILY    FLUAD 1555-7732, 65 YR UP,,PF, 45 mcg (15 mcg x 3)/0.5 mL Syrg ADM 0.5ML IM UTD    FLUZONE HIGH-DOSE 2017-18, PF, 180 mcg/0.5 mL vaccine ADM 0.5ML IM UTD    lancets Misc To check BG 2 times daily, to use with insurance preferred meter    sulfamethoxazole-trimethoprim 800-160mg (BACTRIM DS) 800-160 mg Tab Take 1 tablet by mouth 2 (two) times daily. for 3 days    [DISCONTINUED] nystatin (MYCOSTATIN) cream APPLY   TOPICALLY TO AFFECTED AREA TWICE DAILY     Family History     Problem Relation (Age of Onset)    Diabetes Paternal Grandfather    Heart disease Paternal Grandfather    Hypertension     No Known Problems Mother, Father, Sister, Brother, Maternal Aunt, Maternal Uncle, Paternal Aunt, Paternal Uncle, Maternal Grandmother, Maternal Grandfather, Paternal Grandmother        Tobacco Use    Smoking status: Never Smoker    Smokeless tobacco: Never Used   Substance and Sexual Activity    Alcohol use: Yes     Alcohol/week: 0.0 standard drinks     Comment: occacionally    Drug use: No    Sexual activity: Not Currently     Review of Systems   Constitutional: Positive for activity change, appetite change and fatigue. Negative for chills, diaphoresis and fever.   HENT: Negative for congestion, rhinorrhea, sore throat and trouble  swallowing.    Eyes: Negative for photophobia and visual disturbance.   Respiratory: Negative for cough and shortness of breath.    Cardiovascular: Negative for chest pain, palpitations and leg swelling.   Gastrointestinal: Negative for abdominal distention, abdominal pain, constipation, diarrhea, nausea and vomiting.   Genitourinary: Negative for discharge, dysuria and hematuria.        Urinary incontinence and difficulty urinating   Musculoskeletal: Positive for arthralgias, back pain and gait problem. Negative for myalgias, neck pain and neck stiffness.   Skin: Positive for wound (surgical incision Lt hand). Negative for rash.   Neurological: Positive for weakness (upper lower extremities). Negative for syncope, speech difficulty, numbness and headaches.   Psychiatric/Behavioral: Negative for confusion.     Objective:     Vital Signs (Most Recent):  Temp: 98.7 °F (37.1 °C) (02/26/20 2338)  Pulse: 101 (02/26/20 2338)  BP: (!) 105/55 (02/26/20 2338)  SpO2: (!) 92 % (02/26/20 2338) Vital Signs (24h Range):  Temp:  [98.7 °F (37.1 °C)-98.8 °F (37.1 °C)] 98.7 °F (37.1 °C)  Pulse:  [100-107] 101  SpO2:  [92 %-99 %] 92 %  BP: (105-129)/(55-61) 105/55        There is no height or weight on file to calculate BMI.    Physical Exam   Constitutional: He is oriented to person, place, and time. No distress.   HENT:   Head: Normocephalic and atraumatic.   Eyes: Pupils are equal, round, and reactive to light. EOM are normal. No scleral icterus.   Neck: Normal range of motion. Neck supple. No JVD present.   Cardiovascular: Normal rate and regular rhythm.   No murmur heard.  Pulmonary/Chest: Effort normal. No respiratory distress. He has no wheezes. He has no rales.   Abdominal: Soft. He exhibits no distension. There is no tenderness. There is no guarding.   Musculoskeletal: He exhibits edema (trace).   Proximal muscle weakness of lower extremities bilateral. Preserved muscle strength at level of foot. Proximal muscle weakness of  upper extremities bilaterally with decreased right  strength. Pain at multiple joints of upper and lower extremities.   Neurological: He is alert and oriented to person, place, and time. No sensory deficit. He exhibits normal muscle tone.   Straight leg test negative bilaterally   Skin: Skin is warm and dry. Capillary refill takes less than 2 seconds. He is not diaphoretic.   Psychiatric: He has a normal mood and affect.         CRANIAL NERVES     CN III, IV, VI   Pupils are equal, round, and reactive to light.  Extraocular motions are normal.        Significant Labs:   CBC:   Recent Labs   Lab 02/26/20 1857   WBC 17.69*   HGB 11.5*   HCT 37.0*        CMP:   Recent Labs   Lab 02/26/20 1857      K 4.4      CO2 22*   *   BUN 66*   CREATININE 2.2*   CALCIUM 9.6   PROT 8.1   ALBUMIN 2.6*   BILITOT 2.3*   ALKPHOS 120   *   *   ANIONGAP 13   EGFRNONAA 29.7*     Urine Studies:   Recent Labs   Lab 02/26/20 1858   COLORU [place]   APPEARANCEUA Clear   PHUR 5.0   SPECGRAV 1.015   PROTEINUA Negative   GLUCUA Negative   KETONESU Negative   BILIRUBINUA Negative   OCCULTUA 2+*   NITRITE Negative   LEUKOCYTESUR Negative   RBCUA 1   WBCUA 1   BACTERIA Few*   SQUAMEPITHEL 0   HYALINECASTS 2*     All pertinent labs within the past 24 hours have been reviewed.    Significant Imaging: I have reviewed all pertinent imaging results/findings within the past 24 hours.    Assessment/Plan:     * Polyarthropathy or polyarthritis of multiple sites  Patient presenting with progressively worsening upper and lower extremity weakness and polyarthopathy of unclear etiology. History of chronic gout affecting multiple sites as well as reported history of cervical radiculopathy and DDD lumbar. No neck pain or sensory deficits.  - CRP grossly elevated 434  -   - CPK 2343  - Patients history and lab findings concerning for underlying autoimmune/rheumatologic process.  - Rheumatology consulted  ;   Appreciate recs  - Uric acid pending  - G and C urine PCR pending  - RPR pending  - CT head and cervical pending    Acute kidney injury superimposed on chronic kidney disease  - Creatinine 2.2  (baseline 1.5)  - Likely multifactorial partially due to dehydration in setting of recent procedure and poor po intake as well as systemic inflammatory process and now patient with urinary retention and incontinence.  - CT abdomen unremarkable  - bolused 1.5L in ED  - start NS 150ml/h continuous  - monitor strict I/Os  - Insert Bryant  - repeat labs in am      Non-traumatic rhabdomyolysis   - CPK 2343  - aggressive IVFs  - repeat lab in am  - further management as above      Transaminitis  - , ,   ;  TB 2.3  - CT abdomen unremarkable  - acute hepatitis panel pending  - further management as above      Urinary retention  - New urinary incontinence. Patient was unable to void in the ED and on in and out cath had >500cc output.  - Insert bryant cath  - preserved sensation of lower extremities and straight leg negative bilateral without saddle anesthesia. Unlikely cauda equina  - further management as above      Generalized weakness  - see above      Left carpal tunnel syndrome  POD7 sp left carpal tunnel release  - incisions clean dry and intact without evidence of acute infection      COPD, moderate  - Duonebs prn      Obstructive sleep apnea on CPAP: setting = 8  - CPAP HS        VTE Risk Mitigation (From admission, onward)         Ordered     heparin (porcine) injection 5,000 Units  Every 8 hours      02/27/20 0100     IP VTE HIGH RISK PATIENT  Once      02/27/20 0100                   Justin Jaime MD  Department of Hospital Medicine   [facility]

## 2020-02-27 NOTE — ASSESSMENT & PLAN NOTE
Mr. rodriguez is a 71 yo male with history of gout(not on allopurinol) which usually flares 1x/month in one joint, often his great toe, who presents to [facility] 2/26 with chief complaint of multiple arthralgias, weakness involving upper and lower extremities, fatigue, and decreased PO intake. His ESR and high-sensitivity CRP are significantly elevated at >120 and 416 respectively. He also has elevated uric acid. He has multiple joints--primarily his hands, feet, knees, and ankles which are warm, swollen, and exquisitely tender. He is s/p arthrocentesis of his right knee which revealed monosodium urate crystals(pictured above) consistent with polyarticular gout. He has been developing fevers since admission which can occur in the setting of gout. It is possible his symptoms of weakness could be related to pain. It is unclear why his CK levels are elevated but low suspicion for inflammatory myopathy given the acuity of his presentation.     Recommendations:  -Recommend IV solumedrol 80 mg daily x 3 days. Pending response will determine prednisone dose thereafter.   -Once sCr improves can start colchicine daily   -Follow up synovial fluid studies including cell count and cultures   -Once this acute attack resolves, patient would ideally be started on allopurinol   -Avoid NSAIDs in the setting of CKD  -Patient on thiazide diuretic at home. Would consider stopping this to decrease frequency of gout flares in the future.   -Agree with IV fluids for KISHA and elevated CK

## 2020-02-27 NOTE — ED PROVIDER NOTES
Encounter Date: 2/26/2020       History     Chief Complaint   Patient presents with    Fatigue     seen at several hospitals, x4 days, can not move extremities, left knee pain,      70-year-old male with a history of morbid obesity, CVA, COPD, gout, and hypertension who recently had carpal tunnel release on the left presents with global fatigue E, difficulty walking, trouble moving his extremities. Diagnosed with the UTI started on antibiotics that he did not start to yesterday.  Seen at [facility] yesterday but discharged.  Today he complains of decreased urine output.  Patient denies nausea, vomiting, diarrhea, fever, cough, shortness of breath, chest pain, abdominal pain, or dysuria.  Patient's daughter says his functional capacity is much worse in the last 24 hr.  He cannot walker feed himself.  A ten point review of systems was completed and is negative except as documented above.  Patient denies any other acute medical complaint.  The patients available PMH, PSH, Social History, medications, allergies, and triage vital signs were reviewed just prior to their medical evaluation.        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     Benign hypertension with CKD (chronic kidney disease) stage III      Cataract     Chronic idiopathic gout of multiple sites     Chronic kidney disease, stage 3     COPD (chronic obstructive pulmonary disease)     Erectile dysfunction     Gout     Hemorrhoids without complication     Hyperlipidemia     Morbid obesity     Obstructive sleep apnea on CPAP     Stroke 2016, 2006    Thalamic infarct, acute (right) 1/2016    Type 2 DM with CKD stage 3 and hypertension     On  pravastatin for cardiovascular protection.      Past Surgical History:   Procedure Laterality Date    CARPAL TUNNEL RELEASE Left 2/19/2020    Procedure: RELEASE, CARPAL TUNNEL LEFT;  Surgeon: Maria Luisa Mccurdy MD;  Location: [facility] OR;  Service: Orthopedics;  Laterality: Left;    EPIDURAL STEROID INJECTION N/A 5/2/2019    Procedure: Injection, Steroid, Epidural Cervical;  Surgeon: Dariel Doan Jr., MD;  Location: [facility] ENDO;  Service: Pain Management;  Laterality: N/A;  Cervical Epidural Steroid Injection     27670    Arrive @ 1130; ASA; Check BG    EPIDURAL STEROID INJECTION Bilateral 5/29/2019    Procedure: Lumbar Medial Branch Blocks;  Surgeon: Dariel Doan Jr., MD;  Location: [facility] ENDO;  Service: Pain Management;  Laterality: Bilateral;  Bilateral Lumbar Medial Branch Blocks L3, L4, L5    40536  21020    Attive @ 1030 (11 arrival request); NO Sedation; ASA; Check BG    EPIDURAL STEROID INJECTION Bilateral 7/3/2019    Procedure: Lumbar Medial Branch Blocks;  Surgeon: Dariel Doan Jr., MD;  Location: [facility] ENDO;  Service: Pain Management;  Laterality: Bilateral;  Bilateral Lumbar Medial Branch Blocks L3, L4, L5    67085  00303    Arrive @ 0930; NO Sedation; ASA; Check BG    EPIDURAL STEROID INJECTION N/A 8/7/2019    Procedure: Injection, Steroid, Epidural Cervical;  Surgeon: Dariel Doan Jr., MD;  Location: [facility];  Service: Pain Management;  Laterality: N/A;  Cervical Epidural Steroid Injection C7-T1    94755    Arrive @ 1115; Last ASA 7/30; Check BG    NO PAST SURGERIES       Family History   Problem Relation Age of Onset    No Known Problems Mother     No Known Problems Father     Hypertension Unknown     Diabetes Unknown     Diabetes Paternal Grandfather     Heart disease Paternal Grandfather     No Known Problems Sister     No Known Problems Brother     No Known Problems Maternal Aunt     No Known Problems Maternal Uncle     No Known Problems Paternal Aunt      No Known Problems Paternal Uncle     No Known Problems Maternal Grandmother     No Known Problems Maternal Grandfather     No Known Problems Paternal Grandmother     Amblyopia Neg Hx     Blindness Neg Hx     Cancer Neg Hx     Cataracts Neg Hx     Glaucoma Neg Hx     Macular degeneration Neg Hx     Retinal detachment Neg Hx     Strabismus Neg Hx     Stroke Neg Hx     Thyroid disease Neg Hx      Social History     Tobacco Use    Smoking status: Never Smoker    Smokeless tobacco: Never Used   Substance Use Topics    Alcohol use: Yes     Alcohol/week: 0.0 standard drinks     Comment: occacionally    Drug use: No     Review of Systems   Constitutional: Positive for fatigue. Negative for fever.   HENT: Negative for sore throat.    Eyes: Negative for visual disturbance.   Respiratory: Negative for cough and shortness of breath.    Cardiovascular: Negative for chest pain.   Gastrointestinal: Negative for abdominal pain, diarrhea, nausea and vomiting.   Genitourinary: Positive for decreased urine volume. Negative for dysuria.   Musculoskeletal: Positive for arthralgias. Negative for neck pain.   Skin: Positive for wound. Negative for rash.   Allergic/Immunologic: Negative for immunocompromised state.   Neurological: Negative for syncope.   Psychiatric/Behavioral: Negative for confusion.       Physical Exam     Initial Vitals   BP Pulse Resp Temp SpO2   02/26/20 1726 02/26/20 1726 02/27/20 0150 02/26/20 1726 02/26/20 1726   110/61 107 (!) 28 98.8 °F (37.1 °C) 99 %      MAP       --                Physical Exam    Nursing note and vitals reviewed.  Constitutional: He appears well-developed and well-nourished. He is not diaphoretic. No distress.   HENT:   Head: Normocephalic and atraumatic.   Nose: Nose normal.   Eyes: Conjunctivae are normal. Right eye exhibits no discharge. Left eye exhibits no discharge.   Neck: Normal range of motion. Neck supple.   Cardiovascular: Normal rate, regular rhythm and normal  heart sounds. Exam reveals no gallop and no friction rub.    No murmur heard.  Pulmonary/Chest: Breath sounds normal. No respiratory distress. He has no wheezes. He has no rhonchi. He has no rales.   Abdominal: Soft. He exhibits no distension. There is no tenderness. There is no rebound and no guarding.   Musculoskeletal: Normal range of motion. He exhibits tenderness. He exhibits no edema.   Left knee pain with rom, BLE normal inspection, left wrist in dressing   Neurological: He is alert and oriented to person, place, and time. GCS score is 15. GCS eye subscore is 4. GCS verbal subscore is 5. GCS motor subscore is 6.   Global 4/5 strength   Skin: Skin is warm and dry. No rash noted. No erythema.   Psychiatric: He has a normal mood and affect. His behavior is normal. Judgment and thought content normal.         ED Course   Procedures  Labs Reviewed   CBC W/ AUTO DIFFERENTIAL - Abnormal; Notable for the following components:       Result Value    WBC 17.69 (*)     RBC 4.11 (*)     Hemoglobin 11.5 (*)     Hematocrit 37.0 (*)     Mean Corpuscular Hemoglobin Conc 31.1 (*)     RDW 14.6 (*)     Immature Granulocytes 1.2 (*)     Gran # (ANC) 14.2 (*)     Immature Grans (Abs) 0.22 (*)     Lymph # 0.6 (*)     Mono # 2.7 (*)     Gran% 80.1 (*)     Lymph% 3.2 (*)     Mono% 15.4 (*)     All other components within normal limits   COMPREHENSIVE METABOLIC PANEL - Abnormal; Notable for the following components:    CO2 22 (*)     Glucose 140 (*)     BUN, Bld 66 (*)     Creatinine 2.2 (*)     Albumin 2.6 (*)     Total Bilirubin 2.3 (*)      (*)      (*)     eGFR if  34.3 (*)     eGFR if non  29.7 (*)     All other components within normal limits   URINALYSIS, REFLEX TO URINE CULTURE - Abnormal; Notable for the following components:    Occult Blood UA 2+ (*)     All other components within normal limits    Narrative:     Preferred Collection Type->Urine, Clean Catch   URINALYSIS  MICROSCOPIC - Abnormal; Notable for the following components:    Bacteria Few (*)     Hyaline Casts, UA 2 (*)     All other components within normal limits    Narrative:     Preferred Collection Type->Urine, Clean Catch   ACETAMINOPHEN LEVEL - Abnormal; Notable for the following components:    Acetaminophen (Tylenol), Serum <3.0 (*)     All other components within normal limits   LACTATE DEHYDROGENASE - Abnormal; Notable for the following components:     (*)     All other components within normal limits    Narrative:     add on tests ldh and sedimentation rate per dr johana fields order#   [id number], [id number]  02/26/2020  23:52   add on tests crp and cpk per dr johana fields order# [id number],   [id number]  02/26/2020  23:55    CK - Abnormal; Notable for the following components:    CPK 2343 (*)     All other components within normal limits    Narrative:     add on tests ldh and sedimentation rate per dr johana fields order#   [id number], [id number]  02/26/2020  23:52   add on tests crp and cpk per dr johana fields order# [id number],   [id number]  02/26/2020  23:55    C-REACTIVE PROTEIN - Abnormal; Notable for the following components:    .9 (*)     All other components within normal limits    Narrative:     add on tests ldh and sedimentation rate per dr johana fields order#   [id number], [id number]  02/26/2020  23:52   add on tests crp and cpk per dr johana fields order# [id number],   [id number]  02/26/2020  23:55    MAGNESIUM   CK   SEDIMENTATION RATE   C-REACTIVE PROTEIN   LACTATE DEHYDROGENASE          Imaging Results          CT Cervical Spine Without Contrast (Final result)  Result time 02/27/20 04:50:19    Final result by Tc Grimes MD (02/27/20 04:50:19)                 Impression:      No evidence of acute intracranial pathology.    Generalized cerebral volume loss and mild microvascular ischemic disease.    No fracture or traumatic malalignment in the cervical spine.    Multilevel  cervical spondylosis.    Electronically signed by resident: Tu Cooper MD  Date:    02/27/2020  Time:    04:36    Electronically signed by: Tc Grimes MD  Date:    02/27/2020  Time:    04:50             Narrative:    EXAMINATION:  CT CERVICAL SPINE WITHOUT CONTRAST; CT HEAD WITHOUT CONTRAST    CLINICAL HISTORY:  extremity weakness;    TECHNIQUE:  Low dose axial CT images obtained throughout the head and cervical spine without intravenous contrast.  Axial, sagittal and coronal reconstructions were performed.    COMPARISON:  CTA head neck 02/01/2016.    FINDINGS:  Head CT:    Mild generalized cerebral volume loss with compensatory enlargement of the ventricles and sulci.  The ventricular system is midline.  No evidence of hydrocephalus.    Patchy and confluent regions of supratentorial periventricular hypoattenuation, nonspecific though favored to reflect sequelae of chronic microvascular ischemic disease.  No recent or remote major vascular territory infarct, intra parenchymal hemorrhage, edema, or mass effect.  No extra-axial blood or fluid collections.    No acute calvarial fracture. The extra-calvarial soft tissues are grossly unremarkable.  The visualized paranasal sinuses and mastoid air cells are essentially clear.    Cervical spine CT:    Mild reversal of the normal cervical lordosis.  Nonunion posterior arch of C1 similar to prior CTA exam.  No acute displaced fracture.  Small calcifications are noted about the dens. Vertebral body heights are maintained.  There is multi level intervertebral disc height loss.  There is multilevel posterior disc osteophyte complex, uncovertebral spurring, and osseous hypertrophy of the facets.  No severe bony spinal canal stenosis or high-grade neural foraminal narrowing.  Limited evaluation of the intraspinal contents demonstrates no evidence of hematoma.  Paraspinal soft tissue structures and lung apices reveal no significant abnormality.                                CT Head Without Contrast (Final result)  Result time 02/27/20 04:50:19    Final result by Tc Grimes MD (02/27/20 04:50:19)                 Impression:      No evidence of acute intracranial pathology.    Generalized cerebral volume loss and mild microvascular ischemic disease.    No fracture or traumatic malalignment in the cervical spine.    Multilevel cervical spondylosis.    Electronically signed by resident: Tu Cooper MD  Date:    02/27/2020  Time:    04:36    Electronically signed by: Tc Grimes MD  Date:    02/27/2020  Time:    04:50             Narrative:    EXAMINATION:  CT CERVICAL SPINE WITHOUT CONTRAST; CT HEAD WITHOUT CONTRAST    CLINICAL HISTORY:  extremity weakness;    TECHNIQUE:  Low dose axial CT images obtained throughout the head and cervical spine without intravenous contrast.  Axial, sagittal and coronal reconstructions were performed.    COMPARISON:  CTA head neck 02/01/2016.    FINDINGS:  Head CT:    Mild generalized cerebral volume loss with compensatory enlargement of the ventricles and sulci.  The ventricular system is midline.  No evidence of hydrocephalus.    Patchy and confluent regions of supratentorial periventricular hypoattenuation, nonspecific though favored to reflect sequelae of chronic microvascular ischemic disease.  No recent or remote major vascular territory infarct, intra parenchymal hemorrhage, edema, or mass effect.  No extra-axial blood or fluid collections.    No acute calvarial fracture. The extra-calvarial soft tissues are grossly unremarkable.  The visualized paranasal sinuses and mastoid air cells are essentially clear.    Cervical spine CT:    Mild reversal of the normal cervical lordosis.  Nonunion posterior arch of C1 similar to prior CTA exam.  No acute displaced fracture.  Small calcifications are noted about the dens. Vertebral body heights are maintained.  There is multi level intervertebral disc height loss.  There is multilevel  posterior disc osteophyte complex, uncovertebral spurring, and osseous hypertrophy of the facets.  No severe bony spinal canal stenosis or high-grade neural foraminal narrowing.  Limited evaluation of the intraspinal contents demonstrates no evidence of hematoma.  Paraspinal soft tissue structures and lung apices reveal no significant abnormality.                               CT Abdomen Pelvis  Without Contrast (Final result)  Result time 02/26/20 23:02:23    Final result by Hima Agustin MD (02/26/20 23:02:23)                 Impression:      1. No acute abnormality.  2. Severe chronic degenerative changes of the lumbar spine.  3. Mild bibasilar atelectasis.      Electronically signed by: Hima Agustin  Date:    02/26/2020  Time:    23:02             Narrative:    EXAMINATION:  CT ABDOMEN PELVIS WITHOUT CONTRAST    CLINICAL HISTORY:  Abd pain, fever, abscess suspected;    TECHNIQUE:  Low dose axial images, sagittal and coronal reformations were obtained from the lung bases to the pubic symphysis.  Oral contrast was not administered.  The lack of IV and bowel contrast may diminish the sensitivity.    COMPARISON:  None    FINDINGS:  Large body habitus.    There is slight artifact throughout the field of view which may be related to positioning and or body habitus.    Heart: Normal in size. No pericardial effusion.    Lung Bases: Mild bibasilar atelectasis.    Liver: Normal in size and attenuation, with no focal hepatic lesions.    Gallbladder: No calcified gallstones.    Bile Ducts: No evidence of dilated ducts.    Pancreas: No mass or peripancreatic fat stranding.    Spleen: Unremarkable.    Adrenals: Unremarkable.    Kidneys/ Ureters: Unremarkable.    Bladder: No evidence of wall thickening.    Reproductive organs: Unremarkable.    GI Tract/Mesentery: No evidence of bowel obstruction or inflammation.    Peritoneal Space: No ascites. No free air.    Retroperitoneum: No significant adenopathy.    Abdominal wall:  Unremarkable.    Vasculature: No significant atherosclerosis or aneurysm.    Bones: No acute fracture.  Severe disc space narrowing throughout the lumbar spine.  Sclerotic endplate changes at L2-3 and L3-4.  Severe central canal narrowing at L2-3, L3-4 and L4-5.    The appendix is within normal limits.    Suboptimal visualization.                                 Medical Decision Making:   History:   I obtained history from: someone other than patient.       <> Summary of History: Daughter assists HPI  Old Medical Records: I decided to obtain old medical records.  Old Records Summarized: records from another hospital.       <> Summary of Records:  ED note  Clinical Tests:   Lab Tests: Ordered and Reviewed  ED Management:  70-year-old male presents with global fatigue and decreased functional status.  Vitals with slight tachycardia.  Physical exam as above.  Labs with elevated WBC which is nonspecific.  UA unremarkable.  Elevated bili and LFT's.  Ordered CT abd/pelvis.  Etiology of weakness unclear.  Turned over to Dr. Bonilla pending CT.  Expect admission to  following CT.  Did bedside teaching.  All questions answered.  Family acknowledges understanding.  Other:   I have discussed this case with another health care provider.       <> Summary of the Discussion: above                                 Clinical Impression:   Final diagnoses:  [R53.1] Generalized weakness      Disposition:   Disposition: Other  Condition: Stable     ED Disposition Condition    Observation            Level of Complexity:  High, level 5.               Heriberto Peraza MD  02/27/20 1096

## 2020-02-27 NOTE — ASSESSMENT & PLAN NOTE
- New urinary incontinence. Patient was unable to void in the ED and on in and out cath had >500cc output.  - Insert bryant cath  - preserved sensation of lower extremities and straight leg negative bilateral without saddle anesthesia. Unlikely cauda equina  - further management as above

## 2020-02-28 DIAGNOSIS — E11.9 TYPE 2 DIABETES MELLITUS WITHOUT COMPLICATION: ICD-10-CM

## 2020-02-28 PROBLEM — M10.9 POLYARTICULAR GOUT: Status: ACTIVE | Noted: 2020-02-28

## 2020-02-28 PROBLEM — M1A.0320: Status: ACTIVE | Noted: 2020-02-28

## 2020-02-28 LAB
ADENOVIRUS: NOT DETECTED
ALBUMIN SERPL BCP-MCNC: 1.8 G/DL (ref 3.5–5.2)
ALP SERPL-CCNC: 119 U/L (ref 55–135)
ALT SERPL W/O P-5'-P-CCNC: 141 U/L (ref 10–44)
ANION GAP SERPL CALC-SCNC: 11 MMOL/L (ref 8–16)
APPEARANCE FLD: NORMAL
AST SERPL-CCNC: 216 U/L (ref 10–40)
BASOPHILS # BLD AUTO: 0.01 K/UL (ref 0–0.2)
BASOPHILS NFR BLD: 0.1 % (ref 0–1.9)
BILIRUB SERPL-MCNC: 2.1 MG/DL (ref 0.1–1)
BODY FLD TYPE: ABNORMAL
BODY FLD TYPE: NORMAL
BODY FLUID COMMENTS: NORMAL
BORDETELLA PARAPERTUSSIS (IS1001): NOT DETECTED
BORDETELLA PERTUSSIS (PTXP): NOT DETECTED
BUN SERPL-MCNC: 58 MG/DL (ref 8–23)
CALCIUM SERPL-MCNC: 9 MG/DL (ref 8.7–10.5)
CHLAMYDIA PNEUMONIAE: NOT DETECTED
CHLORIDE SERPL-SCNC: 104 MMOL/L (ref 95–110)
CK SERPL-CCNC: 1500 U/L (ref 20–200)
CO2 SERPL-SCNC: 21 MMOL/L (ref 23–29)
COLOR FLD: NORMAL
CORONAVIRUS 229E, COMMON COLD VIRUS: NOT DETECTED
CORONAVIRUS HKU1, COMMON COLD VIRUS: NOT DETECTED
CORONAVIRUS NL63, COMMON COLD VIRUS: NOT DETECTED
CORONAVIRUS OC43, COMMON COLD VIRUS: NOT DETECTED
CREAT SERPL-MCNC: 1.8 MG/DL (ref 0.5–1.4)
CRYSTALS FLD MICRO: POSITIVE
DIFFERENTIAL METHOD: ABNORMAL
EOSINOPHIL # BLD AUTO: 0 K/UL (ref 0–0.5)
EOSINOPHIL NFR BLD: 0 % (ref 0–8)
ERYTHROCYTE [DISTWIDTH] IN BLOOD BY AUTOMATED COUNT: 14.8 % (ref 11.5–14.5)
EST. GFR  (AFRICAN AMERICAN): 43.7 ML/MIN/1.73 M^2
EST. GFR  (NON AFRICAN AMERICAN): 37.8 ML/MIN/1.73 M^2
FLUBV RNA NPH QL NAA+NON-PROBE: NOT DETECTED
GLUCOSE SERPL-MCNC: 215 MG/DL (ref 70–110)
GRAM STN SPEC: NORMAL
GRAM STN SPEC: NORMAL
HCT VFR BLD AUTO: 32.9 % (ref 40–54)
HGB BLD-MCNC: 10 G/DL (ref 14–18)
HPIV1 RNA NPH QL NAA+NON-PROBE: NOT DETECTED
HPIV2 RNA NPH QL NAA+NON-PROBE: NOT DETECTED
HPIV3 RNA NPH QL NAA+NON-PROBE: NOT DETECTED
HPIV4 RNA NPH QL NAA+NON-PROBE: NOT DETECTED
HUMAN METAPNEUMOVIRUS: NOT DETECTED
IMM GRANULOCYTES # BLD AUTO: 0.18 K/UL (ref 0–0.04)
IMM GRANULOCYTES NFR BLD AUTO: 1.1 % (ref 0–0.5)
INFLUENZA A (SUBTYPES H1,H1-2009,H3): NOT DETECTED
LYMPHOCYTES # BLD AUTO: 0.4 K/UL (ref 1–4.8)
LYMPHOCYTES NFR BLD: 2.6 % (ref 18–48)
LYMPHOCYTES NFR FLD MANUAL: 8 %
MAGNESIUM SERPL-MCNC: 2.2 MG/DL (ref 1.6–2.6)
MCH RBC QN AUTO: 27.7 PG (ref 27–31)
MCHC RBC AUTO-ENTMCNC: 30.4 G/DL (ref 32–36)
MCV RBC AUTO: 91 FL (ref 82–98)
MONOCYTES # BLD AUTO: 0.6 K/UL (ref 0.3–1)
MONOCYTES NFR BLD: 3.6 % (ref 4–15)
MONOS+MACROS NFR FLD MANUAL: 5 %
MYCOPLASMA PNEUMONIAE: NOT DETECTED
NEUTROPHILS # BLD AUTO: 15.3 K/UL (ref 1.8–7.7)
NEUTROPHILS NFR BLD: 92.6 % (ref 38–73)
NEUTROPHILS NFR FLD MANUAL: 87 %
NRBC BLD-RTO: 0 /100 WBC
PATH INTERP FLD-IMP: NORMAL
PHOSPHATE SERPL-MCNC: 4 MG/DL (ref 2.7–4.5)
PLATELET # BLD AUTO: 274 K/UL (ref 150–350)
PMV BLD AUTO: 10.6 FL (ref 9.2–12.9)
POCT GLUCOSE: 203 MG/DL (ref 70–110)
POCT GLUCOSE: 219 MG/DL (ref 70–110)
POCT GLUCOSE: 223 MG/DL (ref 70–110)
POCT GLUCOSE: 238 MG/DL (ref 70–110)
POTASSIUM SERPL-SCNC: 4.5 MMOL/L (ref 3.5–5.1)
PROT SERPL-MCNC: 6.9 G/DL (ref 6–8.4)
RBC # BLD AUTO: 3.61 M/UL (ref 4.6–6.2)
RESPIRATORY INFECTION PANEL SOURCE: NORMAL
RSV RNA NPH QL NAA+NON-PROBE: NOT DETECTED
RV+EV RNA NPH QL NAA+NON-PROBE: NOT DETECTED
SODIUM SERPL-SCNC: 136 MMOL/L (ref 136–145)
WBC # BLD AUTO: 16.46 K/UL (ref 3.9–12.7)
WBC # FLD: NORMAL /CU MM

## 2020-02-28 PROCEDURE — 63600175 PHARM REV CODE 636 W HCPCS: Performed by: INTERNAL MEDICINE

## 2020-02-28 PROCEDURE — 25000003 PHARM REV CODE 250: Performed by: FAMILY MEDICINE

## 2020-02-28 PROCEDURE — 80053 COMPREHEN METABOLIC PANEL: CPT

## 2020-02-28 PROCEDURE — 94660 CPAP INITIATION&MGMT: CPT

## 2020-02-28 PROCEDURE — 87040 BLOOD CULTURE FOR BACTERIA: CPT | Mod: 59

## 2020-02-28 PROCEDURE — 99231 PR SUBSEQUENT HOSPITAL CARE,LEVL I: ICD-10-PCS | Mod: ,,, | Performed by: INTERNAL MEDICINE

## 2020-02-28 PROCEDURE — 11000001 HC ACUTE MED/SURG PRIVATE ROOM

## 2020-02-28 PROCEDURE — 99900035 HC TECH TIME PER 15 MIN (STAT)

## 2020-02-28 PROCEDURE — 89060 EXAM SYNOVIAL FLUID CRYSTALS: CPT

## 2020-02-28 PROCEDURE — 97530 THERAPEUTIC ACTIVITIES: CPT

## 2020-02-28 PROCEDURE — 89051 BODY FLUID CELL COUNT: CPT

## 2020-02-28 PROCEDURE — 87206 SMEAR FLUORESCENT/ACID STAI: CPT

## 2020-02-28 PROCEDURE — 87205 SMEAR GRAM STAIN: CPT

## 2020-02-28 PROCEDURE — 87070 CULTURE OTHR SPECIMN AEROBIC: CPT

## 2020-02-28 PROCEDURE — 84100 ASSAY OF PHOSPHORUS: CPT

## 2020-02-28 PROCEDURE — 87116 MYCOBACTERIA CULTURE: CPT

## 2020-02-28 PROCEDURE — 94761 N-INVAS EAR/PLS OXIMETRY MLT: CPT

## 2020-02-28 PROCEDURE — 99231 SBSQ HOSP IP/OBS SF/LOW 25: CPT | Mod: ,,, | Performed by: INTERNAL MEDICINE

## 2020-02-28 PROCEDURE — 82550 ASSAY OF CK (CPK): CPT

## 2020-02-28 PROCEDURE — 36415 COLL VENOUS BLD VENIPUNCTURE: CPT

## 2020-02-28 PROCEDURE — 99233 PR SUBSEQUENT HOSPITAL CARE,LEVL III: ICD-10-PCS | Mod: ,,, | Performed by: INTERNAL MEDICINE

## 2020-02-28 PROCEDURE — 87075 CULTR BACTERIA EXCEPT BLOOD: CPT

## 2020-02-28 PROCEDURE — 97167 OT EVAL HIGH COMPLEX 60 MIN: CPT

## 2020-02-28 PROCEDURE — 83735 ASSAY OF MAGNESIUM: CPT

## 2020-02-28 PROCEDURE — 63600175 PHARM REV CODE 636 W HCPCS: Performed by: FAMILY MEDICINE

## 2020-02-28 PROCEDURE — 99233 SBSQ HOSP IP/OBS HIGH 50: CPT | Mod: ,,, | Performed by: INTERNAL MEDICINE

## 2020-02-28 PROCEDURE — 97161 PT EVAL LOW COMPLEX 20 MIN: CPT

## 2020-02-28 PROCEDURE — 85025 COMPLETE CBC W/AUTO DIFF WBC: CPT

## 2020-02-28 PROCEDURE — 87102 FUNGUS ISOLATION CULTURE: CPT

## 2020-02-28 RX ORDER — VANCOMYCIN 2 GRAM/500 ML IN 0.9 % SODIUM CHLORIDE INTRAVENOUS
2000
Status: DISCONTINUED | OUTPATIENT
Start: 2020-02-29 | End: 2020-02-28

## 2020-02-28 RX ORDER — HYDRALAZINE HYDROCHLORIDE 25 MG/1
25 TABLET, FILM COATED ORAL EVERY 8 HOURS PRN
Status: DISCONTINUED | OUTPATIENT
Start: 2020-02-28 | End: 2020-03-02 | Stop reason: HOSPADM

## 2020-02-28 RX ORDER — PREDNISONE 10 MG/1
40 TABLET ORAL DAILY
Status: DISCONTINUED | OUTPATIENT
Start: 2020-02-29 | End: 2020-03-02 | Stop reason: HOSPADM

## 2020-02-28 RX ADMIN — ACETAMINOPHEN 650 MG: 325 TABLET ORAL at 05:02

## 2020-02-28 RX ADMIN — INSULIN ASPART 2 UNITS: 100 INJECTION, SOLUTION INTRAVENOUS; SUBCUTANEOUS at 09:02

## 2020-02-28 RX ADMIN — VANCOMYCIN HYDROCHLORIDE 2250 MG: 1.25 INJECTION, POWDER, LYOPHILIZED, FOR SOLUTION INTRAVENOUS at 04:02

## 2020-02-28 RX ADMIN — SENNOSIDES AND DOCUSATE SODIUM 1 TABLET: 8.6; 5 TABLET ORAL at 08:02

## 2020-02-28 RX ADMIN — METHYLPREDNISOLONE SODIUM SUCCINATE 80 MG: 40 INJECTION, POWDER, FOR SOLUTION INTRAMUSCULAR; INTRAVENOUS at 09:02

## 2020-02-28 RX ADMIN — HEPARIN SODIUM 5000 UNITS: 5000 INJECTION, SOLUTION INTRAVENOUS; SUBCUTANEOUS at 02:02

## 2020-02-28 RX ADMIN — HEPARIN SODIUM 5000 UNITS: 5000 INJECTION, SOLUTION INTRAVENOUS; SUBCUTANEOUS at 09:02

## 2020-02-28 RX ADMIN — SODIUM CHLORIDE: 0.9 INJECTION, SOLUTION INTRAVENOUS at 11:02

## 2020-02-28 RX ADMIN — GABAPENTIN 600 MG: 300 CAPSULE ORAL at 02:02

## 2020-02-28 RX ADMIN — DILTIAZEM HYDROCHLORIDE 240 MG: 240 CAPSULE, COATED, EXTENDED RELEASE ORAL at 09:02

## 2020-02-28 RX ADMIN — OXYCODONE HYDROCHLORIDE 10 MG: 10 TABLET ORAL at 08:02

## 2020-02-28 RX ADMIN — SENNOSIDES AND DOCUSATE SODIUM 1 TABLET: 8.6; 5 TABLET ORAL at 09:02

## 2020-02-28 RX ADMIN — OXYCODONE HYDROCHLORIDE 10 MG: 10 TABLET ORAL at 02:02

## 2020-02-28 RX ADMIN — INSULIN ASPART 1 UNITS: 100 INJECTION, SOLUTION INTRAVENOUS; SUBCUTANEOUS at 10:02

## 2020-02-28 RX ADMIN — INSULIN ASPART 2 UNITS: 100 INJECTION, SOLUTION INTRAVENOUS; SUBCUTANEOUS at 12:02

## 2020-02-28 RX ADMIN — GABAPENTIN 600 MG: 300 CAPSULE ORAL at 09:02

## 2020-02-28 RX ADMIN — OXYCODONE HYDROCHLORIDE 10 MG: 10 TABLET ORAL at 06:02

## 2020-02-28 RX ADMIN — HEPARIN SODIUM 5000 UNITS: 5000 INJECTION, SOLUTION INTRAVENOUS; SUBCUTANEOUS at 10:02

## 2020-02-28 RX ADMIN — GABAPENTIN 600 MG: 300 CAPSULE ORAL at 08:02

## 2020-02-28 RX ADMIN — SODIUM CHLORIDE: 0.9 INJECTION, SOLUTION INTRAVENOUS at 06:02

## 2020-02-28 RX ADMIN — INSULIN ASPART 2 UNITS: 100 INJECTION, SOLUTION INTRAVENOUS; SUBCUTANEOUS at 07:02

## 2020-02-28 NOTE — PT/OT/SLP EVAL
Physical Therapy Evaluation    Patient Name:  Bj Pate Jr.   MRN:  [id number]    Recommendations:     Discharge Recommendations:  rehabilitation facility   Discharge Equipment Recommendations: walker, rolling, bath bench(continue to re-assess)   Barriers to discharge: Decreased caregiver support and increased need for skilled assistance at current level of function    Assessment:     Bj Pate Jr. is a 70 y.o. male admitted with a medical diagnosis of Polyarthropathy or polyarthritis of multiple sites.  He presents with the following impairments/functional limitations:  weakness, impaired functional mobilty, pain, gait instability, impaired endurance, decreased lower extremity function, impaired self care skills, decreased upper extremity function, decreased safety awareness, orthopedic precautions, impaired cardiopulmonary response to activity, impaired balance . Pt pleasant and willing to participate in therapy evaluation. Pt required total A of 2 persons for bed mobility, STS transfers, and scooting to the L at the EOB. Pt required min A/SBA for sitting balance. Pt was max A for scooting closer to EOB. Pt recently had carpal tunnel release on L hand 2/19/20. Orthopedic precautions unclear, but pt reports he can't push more than 4lb.    Rehab Prognosis: Good; patient would benefit from acute skilled PT services to address these deficits and reach maximum level of function.    Recent Surgery: * No surgery found *      Plan:     During this hospitalization, patient to be seen 3 x/week to address the identified rehab impairments via gait training, therapeutic activities, therapeutic exercises, neuromuscular re-education and progress toward the following goals:    · Plan of Care Expires:  03/29/20    Subjective     Chief Complaint: Pain in extremities with movement  Patient/Family Comments/goals: return to PLOF  Pain/Comfort:  · Pain Rating 1: other (see comments)(did not rate)  · Location - Side 1:  Bilateral  · Location - Orientation 1: generalized  · Location 1: leg(with movement)  · Pain Addressed 1: Reposition, Distraction, Cessation of Activity  · Pain Rating Post-Intervention 1: 0/10(at rest)    Patients cultural, spiritual, Anglican conflicts given the current situation:      Living Environment:  Pt lives alone in a H with TH PABLO. Pt has a tub/shower combo with grab bars.  Prior to admission, patients level of function was independent with ambulation and ADL's.  Equipment used at home: none.  DME owned (not currently used): none.  Upon discharge, patient will have assistance from children who live nearby.    Objective:     Communicated with nurse prior to session.  Patient found HOB elevated with bed alarm, peripheral IV, telemetry  upon PT entry to room.    Pt's stitches from carpal tunnel release intact.    General Precautions: Standard, fall   Orthopedic Precautions:N/A   Braces: N/A     Exams:  · Cognitive Exam:  Patient is oriented to Person, Place, Time and Situation  · Gross Motor Coordination:  delayed initiation  · Sensation:    · -       Intact  light/touch BLE  · RLE ROM: WFL  · RLE Strength: WFL except limited by pain  · LLE ROM: WFL  · LLE Strength: WFL except limited by pain    Functional Mobility:  · Bed Mobility:     · Scooting: maximal assistance to scoot forward toward EOB, total assistance of 2 persons to scoot to the L at EOB  · Supine to Sit: total assistance and of 2 persons  · Sit to Supine: total assistance and of 2 persons  · Transfers:     · Sit to Stand:  total assistance and of 2 persons with no AD; 3 trials, 2 trials only partial as part of scooting to the L at EOB.  · Gait: Unable 2* pain  · Balance: Pt initially required min A for static sitting, transitioned to SBA for static and dynamic; static stand: total A of 2 persons      Therapeutic Activities and Exercises:  Pt stood for 1 minute with total A of 2 persons with maximum verbal and tactile cues to stand erect  Pt  sat at EOB with SBA for 10' with variations of UE support. Minimal cues to maintain erect posture.    Pt educated on PT role/POC, benefits of OOB activity. Pt verbalized understanding      AM-PAC 6 CLICK MOBILITY  Total Score:10     Patient left HOB elevated with all lines intact, call button in reach, nurse notified and 2 daughters present.    GOALS:   Multidisciplinary Problems     Physical Therapy Goals        Problem: Physical Therapy Goal    Goal Priority Disciplines Outcome Goal Variances Interventions   Physical Therapy Goal     PT, PT/OT Ongoing, Progressing     Description:  Goals to be met by: 3/13/2020     Patient will increase functional independence with mobility by performin. Supine to sit with Moderate Assistance  2. Sit to supine with Moderate Assistance  3. Sit to stand transfer with Moderate Assistance  4. Bed to chair transfer with Moderate Assistance using LRAD  5. Gait  x 25 feet with Moderate Assistance using LRAD.   6. Lower extremity exercise program x15 reps per handout, with assistance as needed                      History:     Past Medical History:   Diagnosis Date    Anticoagulant long-term use     Basal ganglia hemorrhage     Left basal ganglia hemorrhage with resultant right-sided hemiparesis which has resolved.     Benign hypertension with CKD (chronic kidney disease) stage III      Cataract     Chronic idiopathic gout of multiple sites     Chronic kidney disease, stage 3     COPD (chronic obstructive pulmonary disease)     Erectile dysfunction     Gout     Hemorrhoids without complication     Hyperlipidemia     Morbid obesity     Obstructive sleep apnea on CPAP     Stroke 2006    Thalamic infarct, acute (right) 2016    Type 2 DM with CKD stage 3 and hypertension     On pravastatin for cardiovascular protection.        Past Surgical History:   Procedure Laterality Date    CARPAL TUNNEL RELEASE Left 2020    Procedure: RELEASE, CARPAL TUNNEL LEFT;   Surgeon: Maria Luisa Mccurdy MD;  Location: [facility] OR;  Service: Orthopedics;  Laterality: Left;    EPIDURAL STEROID INJECTION N/A 5/2/2019    Procedure: Injection, Steroid, Epidural Cervical;  Surgeon: Dariel Doan Jr., MD;  Location: [facility] ENDO;  Service: Pain Management;  Laterality: N/A;  Cervical Epidural Steroid Injection     09963    Arrive @ 1130; ASA; Check BG    EPIDURAL STEROID INJECTION Bilateral 5/29/2019    Procedure: Lumbar Medial Branch Blocks;  Surgeon: Dariel Doan Jr., MD;  Location: [facility] ENDO;  Service: Pain Management;  Laterality: Bilateral;  Bilateral Lumbar Medial Branch Blocks L3, L4, L5    [id number]  [id number]    Attive @ 1030 (11 arrival request); NO Sedation; ASA; Check BG    EPIDURAL STEROID INJECTION Bilateral 7/3/2019    Procedure: Lumbar Medial Branch Blocks;  Surgeon: Dariel Doan Jr., MD;  Location: [facility] ENDO;  Service: Pain Management;  Laterality: Bilateral;  Bilateral Lumbar Medial Branch Blocks L3, L4, L5    [id number]  [id number]    Arrive @ 0930; NO Sedation; ASA; Check BG    EPIDURAL STEROID INJECTION N/A 8/7/2019    Procedure: Injection, Steroid, Epidural Cervical;  Surgeon: Dariel Doan Jr., MD;  Location: [facility] ENDO;  Service: Pain Management;  Laterality: N/A;  Cervical Epidural Steroid Injection C7-T1    12256    Arrive @ 1115; Last ASA 7/30; Check BG    NO PAST SURGERIES         Time Tracking:     PT Received On: 02/28/20  PT Start Time: 0855     PT Stop Time: 0925  PT Total Time (min): 30 min     Billable Minutes: Evaluation 10 and Therapeutic Activity 20      LEONCIO Hagan  02/28/2020

## 2020-02-28 NOTE — PLAN OF CARE
OT eval and treat. Plan of care established.    Problem: Occupational Therapy Goal  Goal: Occupational Therapy Goal  Description  Goals to be met by: 3/6/2020     Patient will increase functional independence with ADLs by performing:    UE Dressing with Contact Guard Assistance.  LE Dressing with Contact Guard Assistance.  Grooming while EOB with Supervision.  Toileting from bedside commode with Minimal Assistance for hygiene and clothing management.   Pt will perform B bed mobility tasks with MIN A.   Pt will perform functional mobility task of household and community distance with MIN A using AD as needed.  Pt will increase BUE ROM to functional limits to increase functional independence     Outcome: Ongoing, Progressing   .

## 2020-02-28 NOTE — PLAN OF CARE
I agree with my colleague's HPI and A/P. I have personally seen and examined patient, and reviewed their charts including vitals, labs, imaging and notes.    This is a 69yo male with a past medical history of CVA without residual deficit, COPD, HILARIO on CPAP, Gout, DDD of Lumbar, Cervical Radiculopathy, HTN and recent Left CTS surgery presented with rapid onset polyarthritis of his UE and LE with associated fever, fatigue and decreased po intake. Presented to multiple ED's in the week with negative work up, thus sent home. He attributes significant weakness secondary to pain. Pain rendered him immobile for almost 2 day unsupervised at home (he was unable to get out of his chair). Physical exam with asymmetric swelling on his UE and LE. Labs noting leukocytosis 17, Cr 2.2 (baseline 1.5), , CPK 2343, . CT A/P unremarkable. Admitted to hospital medicine. Rheumatology consulted, suspecting polyarticular gout. S/p bedside arthrocentesis by Rhem positive for urate crystals confirmed diagnosis. Fever likely from acute gout flare. Elevated CPK suspected from prolonged immobility. AUS and acute hep negative. Suspecting LFT elevation from rhabdo. Rheum rec IV steroids, and adding colchicine when renal function improved. Left UE with no DVT. Continue fluids and IV steroids for now. Supportive care.        Guilherme Anderson MD

## 2020-02-28 NOTE — ASSESSMENT & PLAN NOTE
Mr. rodriguez is a 69 yo male with history of gout(not on allopurinol) which usually flares 1x/month in one joint, often his great toe, who [place] [facility] [place] with chief complaint of multiple arthralgias, weakness involving upper and lower extremities, fatigue, and decreased PO intake. His ESR and high-sensitivity CRP are significantly elevated at >120 and 416 respectively. He also has elevated uric acid. He has multiple joints--primarily his hands, feet, knees, and ankles which are warm, swollen, and exquisitely tender. He is s/p arthrocentesis of his right knee which revealed monosodium urate crystals(pictured above) consistent with polyarticular gout. He has been developing fevers since admission which can occur in the setting of gout. It is possible his symptoms of weakness could be related to pain. It is unclear why his CK levels are elevated but low suspicion for inflammatory myopathy given the acuity of his presentation.   -Synovial fluid of right knee with 73830 WBCs, 99% segs. Cultures without growth thus far  -s/p 2 doses of IV solumedrol 80 mg patient has significant improvement in his pain and ability to move his extremities  -He appears to now have 2/4 blood culture bottles with gram positive cocci in clusters resembling staph  Recommendations:  -Recommend stopping IV solumedrol given bacteremia and switching to prednisone 40 mg daily starting 2/29  -Once kidney function returns to baseline, can start colchicine 0.6 mg daily  -Follow up synovial fluid cultures  -Concern for left wrist as source of bacteremia given recent surgery. Would recommend ortho consult to evaluate this.   -Once this acute attack resolves, patient would ideally be started on allopurinol   -Avoid NSAIDs in the setting of CKD  -Patient on thiazide diuretic at home. Would consider stopping this to decrease frequency of gout flares in the future.   -Agree with IV fluids for KISHA and elevated CK

## 2020-02-28 NOTE — PROGRESS NOTES
[facility]  Rheumatology  Progress Note    Patient Name: Bj Pate Jr.  MRN: [id number]  Admission Date: 2/26/2020  Hospital Length of Stay: 1 days  Code Status: Full Code   Attending Provider: Guilherme Anderson MD  Primary Care Physician: Azikiwe K Lombard, MD  Principal Problem: Polyarthropathy or polyarthritis of multiple sites    Subjective:     HPI: Mr. Pate is a 69 yo male with past medical history of morbid obesity, COPD, Gout, HTN, CVA with no residual deficits, CKD(baseline Scr 1.5), and HILARIO on CPAP who presents to [facility] on 2/27 with [facility] complaint of generalized weakness which has been progressively worsening over the past week. The patient says he had carpal tunnel release surgery on 2/19 when it seems all of his symptoms started. His weakness began with difficulty getting out of a chair and moving around and now he cannot move at all. He also had pain and swelling in multiple joints--particularly the wrist, small joints of bilateral hands, elbows, knees, and ankles. He has significant pain, redness, and swelling in his left wrist where he had surgery. He has a history of gout--he says he gets flares about once every month usually monoarticular in his big toe. He has never had arthrocentesis performed and says his gout was a clinical diagnosis. Other symptoms has been having are decreased urine output characterized by feeling he has to urinate but cant with some occasional incontinence, fatigue, and decreased PO intake.  He denies any sick contacts. He denies any diarrhea, nausea, vomiting, fevers, chills, shortness of breath, loss of sensation anywhere, dysuria, headaches, confusion, myalgias.     Of note he presented to the ED on 2/24 for urinary incontinence and although UA not consistent with UTI was sent home with course of bactrim. He says he did not take this medication. He represented on 2/25 again but this time for diffuse arthralgias and generalized weakness but was sent home  again after initial workup was negative.    On arrival here, he was febrile with Tm 100.7, tachycardic in low 100s, SBPs on softer side as low as 90s, satting normally on room air. His labs were significant for elevated WBC 17K, H/H 10.1/32.7, CMP with low albumin 2.2, AST/ and 133, Tbili 2.3, elevated inflammatory markers wit ESR >120 and high-sensitivity, , CK 2681, uric acid 11.4, normal TSH, UA with 2+ blood but 1 RBC. They obtained imaging of his head and sine which showed no acute abnormality. They also obtained abdominal ultrasound which showed hepatosteatosis.     Interval History:   No acute events overnight. Patient significantly improved today and is able to move his extremities some without as much pain in his joints. He was able to sit on the side of the best with some assistance by PT/OT.  He has been afebrile since 4pm yesterday. 2/4 bottles of blood cultures staining for gram positive cocci in clusters resembling staph.     Current Facility-Administered Medications   Medication Frequency    0.9%  NaCl infusion Continuous    acetaminophen tablet 650 mg Q4H PRN    albuterol-ipratropium 2.5 mg-0.5 mg/3 mL nebulizer solution 3 mL Q4H PRN    dextrose 10% (D10W) Bolus PRN    dextrose 10% (D10W) Bolus PRN    diltiaZEM 24 hr capsule 240 mg Daily    fluticasone furoate-vilanterol 100-25 mcg/dose diskus inhaler 1 puff Daily    gabapentin capsule 600 mg TID    glucagon (human recombinant) injection 1 mg PRN    glucose chewable tablet 16 g PRN    glucose chewable tablet 24 g PRN    heparin (porcine) injection 5,000 Units Q8H    insulin aspart U-100 pen 0-5 Units QID (AC + HS) PRN    ondansetron injection 4 mg Q8H PRN    oxyCODONE immediate release tablet 10 mg Q6H PRN    oxyCODONE immediate release tablet 5 mg Q6H PRN    senna-docusate 8.6-50 mg per tablet 1 tablet BID    sodium chloride 0.9% flush 10 mL PRN    vancomycin - pharmacy to dose pharmacy to manage frequency      Facility-Administered Medications Ordered in Other Encounters   Medication Frequency    0.9%  NaCl infusion Continuous     Objective:     Vital Signs (Most Recent):  Temp: 98.9 °F (37.2 °C) (02/28/20 0751)  Pulse: 91 (02/28/20 0751)  Resp: 18 (02/28/20 0751)  BP: 132/69 (02/28/20 0751)  SpO2: 97 % (02/28/20 0751)  O2 Device (Oxygen Therapy): room air (02/28/20 0252) Vital Signs (24h Range):  Temp:  [98.9 °F (37.2 °C)-101.4 °F (38.6 °C)] 98.9 °F (37.2 °C)  Pulse:  [] 91  Resp:  [17-19] 18  SpO2:  [92 %-100 %] 97 %  BP: (129-138)/(57-80) 132/69     Weight: (!) 148 kg (326 lb 4.5 oz) (02/27/20 0159)  Body mass index is 48.18 kg/m².  Body surface area is 2.68 meters squared.      Intake/Output Summary (Last 24 hours) at 2/28/2020 1112  Last data filed at 2/28/2020 0500  Gross per 24 hour   Intake 1382 ml   Output 1400 ml   Net -18 ml       Physical Exam   Constitutional: He is oriented to person, place, and time and well-developed, well-nourished, and in no distress.   He appears more awake, comfortable and less acutely ill today compared to yesterday   HENT:   Head: Normocephalic and atraumatic.   Eyes: Conjunctivae and EOM are normal. Pupils are equal, round, and reactive to light. No scleral icterus.   Neck: Normal range of motion. Neck supple.   Cardiovascular: Normal rate, regular rhythm, normal heart sounds and intact distal pulses.  Exam reveals no gallop and no friction rub.    No murmur heard.  Pulmonary/Chest: Effort normal and breath sounds normal. No respiratory distress. He has no wheezes. He has no rales.   Abdominal: Soft. Bowel sounds are normal. He exhibits no distension. There is no tenderness.   Lymphadenopathy:     He has no cervical adenopathy.   Neurological: He is alert and oriented to person, place, and time.   Improvement in strength in his extremities 2/2 improvement in pain   Synovitis present in joints of elbows, hands, knees, and feet   Skin: Skin is warm and dry.      Musculoskeletal: Normal range of motion. He exhibits no edema.         Significant Labs:  Blood Culture: No results for input(s): LABBLOO in the last 24 hours.  CBC:   Recent Labs   Lab 02/28/20 0627   WBC 16.46*   HGB 10.0*   HCT 32.9*        CMP:   Recent Labs   Lab 02/28/20 0627   *   CALCIUM 9.0   ALBUMIN 1.8*   PROT 6.9      K 4.5   CO2 21*      BUN 58*   CREATININE 1.8*   ALKPHOS 119   *   *   BILITOT 2.1*     CPK:   Recent Labs   Lab 02/28/20 0627   CPK 1500*     ESR: No results for input(s): SEDRATE in the last 24 hours.    Significant Imaging:  US left upper extremity with no evidence of DVT    Assessment/Plan:     * Polyarthropathy or polyarthritis of multiple sites  Mr. rodriguez is a 71 yo male with history of gout(not on allopurinol) which usually flares 1x/month in one joint, often his great toe, who [place] [facility] [place] with chief complaint of multiple arthralgias, weakness involving upper and lower extremities, fatigue, and decreased PO intake. His ESR and high-sensitivity CRP are significantly elevated at >120 and 416 respectively. He also has elevated uric acid. He has multiple joints--primarily his hands, feet, knees, and ankles which are warm, swollen, and exquisitely tender. He is s/p arthrocentesis of his right knee which revealed monosodium urate crystals(pictured above) consistent with polyarticular gout. He has been developing fevers since admission which can occur in the setting of gout. It is possible his symptoms of weakness could be related to pain. It is unclear why his CK levels are elevated but low suspicion for inflammatory myopathy given the acuity of his presentation.   -Synovial fluid of right knee with 32111 WBCs, 99% segs. Cultures without growth thus far  -s/p 2 doses of IV solumedrol 80 mg patient has significant improvement in his pain and ability to move his extremities  -He appears to now have 2/4 blood culture bottles with gram  positive cocci in clusters resembling staph  Recommendations:  -Recommend stopping IV solumedrol given bacteremia and switching to prednisone 40 mg daily starting 2/29  -Once kidney function returns to baseline, can start colchicine 0.6 mg daily  -Follow up synovial fluid cultures  -Concern for left wrist as source of bacteremia given recent surgery. Would recommend ortho consult to evaluate this.   -Once this acute attack resolves, patient would ideally be started on allopurinol   -Avoid NSAIDs in the setting of CKD  -Patient on thiazide diuretic at home. Would consider stopping this to decrease frequency of gout flares in the future.   -Agree with IV fluids for KISHA and elevated CK           Thank you for involving us in the care of Mr. Pate. Please call with questions or concerns.     Kamran Rodriguez MD  Rheumatology  [facility]

## 2020-02-28 NOTE — PLAN OF CARE
Plan of Care dicussed with the patient and family at the beginning of shift.Pt informed to utilize the call light. Q2hr turns implemented as well as accurate intact and output documented. Pt and family accepting of treatment plan. Pt VSS. Pt fluids infusing at 150ml without difficulty. Pt bryant draining dark yellow madison urine; no signs of obstruction. Dependent loops prevented by utilization of of bryant clip. Pt heel free floating; bilateral upper extremities elevated. Pt tolerated well. Will continue to monitor.

## 2020-02-28 NOTE — HPI
CVA, Gout, DDD of Lumbar, Cervical Radiculopathy, and L Carpal tunnel surgery (2/2019 by Dr. Mccurdy) recent Left CTS surgery, presented to [facility] [place] on 2/27 with  generalized weakness which has been progressively worsening over the past week. Labs noting leukocytosis 17, Cr 2.2 (baseline 1.5), , CPK 2343, . Patient admitted for possible sepsis, BCxs NGTD currently. Rheum was consulted and performed bedside arthrocentesis of patient's knee had that resulted with positive urate crystals.

## 2020-02-28 NOTE — PROGRESS NOTES
Pharmacokinetic Initial Assessment: IV Vancomycin    Assessment/Plan:    Initiate intravenous vancomycin with loading dose of 2250 mg once followed by a maintenance dose of vancomycin 2000 mg IV every 24 hours.  Desired empiric serum trough concentration is 15 to 20 mcg/mL.  Draw vancomycin trough level 30 min prior to third dose on 3/1/20 at approximately 0400.  Pharmacy will continue to follow and monitor vancomycin.      Please contact pharmacy at extension [phone] with any questions regarding this assessment.     Thank you for the consult,   Frnany Hi     Patient brief summary:  Bj Pate Jr. is a 70 y.o. male initiated on antimicrobial therapy with IV Vancomycin for treatment of suspected bacteremia.    Drug Allergies:   Review of patient's allergies indicates:   Allergen Reactions    Tomato (solanum lycopersicum) Hives    Naproxen Hives    Shrimp Other (See Comments)       Actual Body Weight:   148 kg    Renal Function:   Estimated Creatinine Clearance: 50.8 mL/min (A) (based on SCr of 2 mg/dL (H)).,     CBC (last 72 hours):  Recent Labs   Lab Result Units 02/26/20 1857 02/27/20  0441   WBC K/uL 17.69* 18.22*   Hemoglobin g/dL 11.5* 10.1*   Hematocrit % 37.0* 32.7*   Platelets K/uL 256 255   Gran% % 80.1* 79.7*   Lymph% % 3.2* 4.9*   Mono% % 15.4* 14.6   Eosinophil% % 0.0 0.0   Basophil% % 0.1 0.1   Differential Method  Automated Automated       Metabolic Panel (last 72 hours):  Recent Labs   Lab Result Units 02/26/20 1857 02/26/20 1858 02/27/20 0223 02/27/20  0441   Sodium mmol/L 136  --   --  137   Sodium Urine Random mmol/L  --   --  25  --    Potassium mmol/L 4.4  --   --  4.2   Chloride mmol/L 101  --   --  103   CO2 mmol/L 22*  --   --  24   Glucose mg/dL 140*  --   --  110   Glucose, UA   --  Negative  --   --    BUN, Bld mg/dL 66*  --   --  68*   Creatinine mg/dL 2.2*  --   --  2.0*   Albumin g/dL 2.6*  --   --  2.2*   Total Bilirubin mg/dL 2.3*  --   --  2.0*   Alkaline Phosphatase U/L  120  --   --  114   AST U/L 234*  --   --  178*   ALT U/L 165*  --   --  133*   Magnesium mg/dL 2.3  --   --  2.2   Phosphorus mg/dL  --   --   --  3.5       Drug levels (last 3 results):  No results for input(s): VANCOMYCINRA, VANCOMYCINPE, VANCOMYCINTR in the last 72 hours.    Microbiologic Results:  Microbiology Results (last 7 days)     Procedure Component Value Units Date/Time    Blood culture [[id number]]     Order Status:  Sent Specimen:  Blood     Blood culture [[id number]]     Order Status:  Sent Specimen:  Blood     Blood culture #1 **CANNOT BE ORDERED STAT** [[id number]] Collected:  02/26/20 2346    Order Status:  Completed Specimen:  Blood from Peripheral, Hand, Right Updated:  02/28/20 0321     Blood Culture, Routine Gram stain aer bottle: Gram positive cocci in clusters resembling Staph       Gram stain luis eduardo bottle: Gram positive cocci in clusters resembling Staph       Results called to and read back by: Millie Reyes RN 02/28/2020        03:20    Respiratory Infection Panel, Nasopharyngeal [[id number]] Collected:  02/27/20 1904    Order Status:  Completed Specimen:  Nasopharyngeal Swab Updated:  02/28/20 0001     Respiratory Infection Panel Source NP Swab     Adenovirus Not Detected     Coronavirus 229E, Common Cold Virus Not Detected     Coronavirus HKU1, Common Cold Virus Not Detected     Coronavirus NL63, Common Cold Virus Not Detected     Coronavirus OC43, Common Cold Virus Not Detected     Comment: The Coronavirus strains detected in this test cause the common cold.  These strains are not the COVID-19 (novel Coronavirus)strain   associated with the respiratory disease outbreak.          Human Metapneumovirus Not Detected     Human Rhinovirus/Enterovirus Not Detected     Influenza A (subtypes H1, H1-2009,H3) Not Detected     Influenza B Not Detected     Parainfluenza Virus 1 Not Detected     Parainfluenza Virus 2 Not Detected     Parainfluenza Virus 3 Not Detected     Parainfluenza Virus 4 Not  Detected     Respiratory Syncytial Virus Not Detected     Bordetella Parapertussis (MD9087) Not Detected     Bordetella pertussis (ptxP) Not Detected     Chlamydia pneumoniae Not Detected     Mycoplasma pneumoniae Not Detected     Comment: Respiratory Infection Panel testing performed by Multiplex PCR.       Narrative:       For all other respiratory sources order [id number] Respiratory  Viral Panel by PCR (RVPCR)    Aerobic culture [[id number]] Collected:  02/27/20 1629    Order Status:  Sent Specimen:  Body Fluid from Knee, Right Updated:  02/27/20 1748    Culture, Anaerobe [[id number]] Collected:  02/27/20 1629    Order Status:  Sent Specimen:  Body Fluid from Knee, Right Updated:  02/27/20 1748    Respiratory Infection Panel, Nasopharyngeal [[id number]] Collected:  02/27/20 1713    Order Status:  Canceled Specimen:  Nasopharyngeal Swab     Influenza A & B by Molecular [[id number]] Collected:  02/27/20 1631    Order Status:  Sent Specimen:  Nasopharyngeal Swab Updated:  02/27/20 1631    Blood culture #2 **CANNOT BE ORDERED STAT** [[id number]] Collected:  02/26/20 2357    Order Status:  Completed Specimen:  Blood from Peripheral, Forearm, Left Updated:  02/27/20 0715     Blood Culture, Routine No Growth to date    C. trachomatis/N. gonorrhoeae by AMP DNA [facility]; Urine [[id number]]     Order Status:  No result Specimen:  Genital

## 2020-02-28 NOTE — PT/OT/SLP EVAL
"Occupational Therapy   Evaluation    Name: Bj Pate Jr.  MRN: [id number]  Admitting Diagnosis:  Polyarthropathy or polyarthritis of multiple sites      Recommendations:     Discharge Recommendations: rehabilitation facility  Discharge Equipment Recommendations:  (TBD (progress pending))  Barriers to discharge:  Decreased caregiver support(Increased assistance needed)    Assessment:     Bj Pate Jr. is a 70 y.o. male with a medical diagnosis of Polyarthropathy or polyarthritis of multiple sites.  He presents with performance deficits including weakness, impaired endurance, impaired self care skills, impaired functional mobilty, gait instability, impaired balance, decreased coordination, decreased upper extremity function, decreased lower extremity function, decreased safety awareness, pain, decreased ROM, impaired coordination, impaired fine motor, edema, impaired cardiopulmonary response to activity. Pt presenting to hospital after week long period of going to urgent care and other [facility] within the city to seek answers and relief of pain. Pt presenting with decreased functional mobility due to increased pain with movements and edema at this time, requiring MAX to Total A to perform functional mobility. Pt would continue to benefit from acute skilled OT services to increase functional independence. OT to recommend Rehab upon D/C to improve PLOF.     Rehab Prognosis: Good; patient would benefit from acute skilled OT services to address these deficits and reach maximum level of function.       Plan:     Patient to be seen 3 x/week to address the above listed problems via self-care/home management, therapeutic activities, therapeutic exercises  · Plan of Care Expires: 03/28/20  · Plan of Care Reviewed with: patient, family    Subjective   "I haven't been able to move like myself since Tuesday."    Chief Complaint: Pain  Patient/Family Comments/goals: Return to PLOF    Occupational Profile:  Living " Environment: Lives alone in [address] [facility] with threshold step to enter. Reports tub/shower combo and grab bars, no other DME/AD present  Previous level of function: Independent in ADLs/IADLs; was driving  Equipment Used at Home:  none  Assistance upon Discharge: Some assist from family as needed    Pain/Comfort:  · Pain Rating 1: 0/10  · Location - Side 1: Bilateral  · Location - Orientation 1: generalized  · Location 1: leg  · Pain Addressed 1: Reposition, Distraction, Cessation of Activity  · Pain Rating Post-Intervention 1: (Numerical value not provided)    Patients cultural, spiritual, Hoahaoism conflicts given the current situation: no    Objective:     Communicated with: RN prior to session.  Patient found HOB elevated with bed alarm, peripheral IV, telemetry upon OT entry to room. Pt agreeable to participate in OT evaluation at this time.    General Precautions: Standard, fall   Orthopedic Precautions:N/A   Braces: N/A     Occupational Performance:    Bed Mobility:    · Patient completed Rolling/Turning to Left with  maximal assistance  · Patient completed Scooting to EOB with maximal assistance; required verbal cueing for weight-shift into opposite hips  · Patient completed Supine to Sit with total assistance and 2 persons  · Patient completed Sit to Supine with total assistance and 2 persons    Functional Mobility/Transfers:  · Patient completed Sit <> Stand Transfer from EOB with total assistance and of 2 persons with no assistive device and hand-held assist   · Functional Mobility: Pt performed functional mobility of lateral scoot to HOB with Total A x 2 persons, required tactile cueing of hip shifts to progress to HOB  · Pt unable to perform weight-shift into BLE at this time to perform steps, and unable to maintain full stance with clearing hips off bed for lateral scoots    Activities of Daily Living:  · Feeding:  minimum assistance not assessed this date, however, pt requires set-up and assistance to bring  food to mouth  · Grooming: minimum assistance not assessed this date, however, to perform oral care seated with set-up and assistance to bring BUE to face  · Upper Body Dressing: moderate assistance to don second gown as robe, assistance requried to thread arms through correct rooms.  · Lower Body Dressing: total assistance to don socks  · Toileting: total assistance bryant cather  in place    Cognitive/Visual Perceptual:  Cognitive/Psychosocial Skills:     -       Oriented to: Person, Place, Time and Situation   -       Follows Commands/attention:Follows one-step commands  Visual/Perceptual:      -Intact reports wearing prescribed glasses    Physical Exam:  Balance:    -       EOB sitting fair+; poor standing balance  Edema:  Severe BUE and BLE  Dominant hand:    -       R hand dominant  Sensation:  -pt sensitive to light touch in hands, reports pain  Upper Extremity Range of Motion:     -       Right Upper Extremity: Pt limited due to pain: observed ~30 shoulder flex and ~40 shoulder ABD; PROM WFL  -       Left Upper Extremity: Pt limited due to pain: observed ~30 shoulder flex and ~40 shoulder ABD; PROM WFL  Upper Extremity Strength:    -       Right Upper Extremity: 4-/5  -       Left Upper Extremity: 4-/5   Strength:    -       Right Upper Extremity: Unable to formally assess due to pt with increased pain and edema in RUE; unable to perform full fist/release  -       Left Upper Extremity: Unable to formally assess due to pt with increased pain and edema in LUE; unable to perform full fist/release  Fine Motor Coordination:    -       Intact  Left hand thumb/finger opposition skills and Right hand thumb/finger opposition skills    AMPAC 6 Click ADL:  AMPAC Total Score: 12    Treatment & Education:  OT eval and treat, role reviewed and plan of care established. Pt tolerated session fair, however with increased movement reports pain worsens. Pt performed EOB sitting for ~15 mins with MIN A due to posterior lean,  progressed to SBA after verbal and tactile cueing of midline seated posture. Pt performed stance with full posture for ~1 minute, requiring MAX verbal and tactile cues to maintain full erect posture; pt with increased forward flex, B knee flex, and increased fatigue with continued stance activity.     Pt with carpal tunnel release surgery to LUE on 2/19, stitches still present. Educated on NWB into hand at this time until communicated from original surgeron.  Education:    Patient left HOB elevated with all lines intact, call button in reach and family present    GOALS:   Multidisciplinary Problems     Occupational Therapy Goals        Problem: Occupational Therapy Goal    Goal Priority Disciplines Outcome Interventions   Occupational Therapy Goal     OT, PT/OT Ongoing, Progressing    Description:  Goals to be met by: 3/6/2020     Patient will increase functional independence with ADLs by performing:    UE Dressing with Contact Guard Assistance.  LE Dressing with Contact Guard Assistance.  Grooming while EOB with Supervision.  Toileting from bedside commode with Minimal Assistance for hygiene and clothing management.   Pt will perform B bed mobility tasks with MIN A.   Pt will perform functional mobility task of household and community distance with MIN A using AD as needed.  Pt will increase BUE ROM to functional limits to increase functional independence                      History:     Past Medical History:   Diagnosis Date    Anticoagulant long-term use     Basal ganglia hemorrhage     Left basal ganglia hemorrhage with resultant right-sided hemiparesis which has resolved.     Benign hypertension with CKD (chronic kidney disease) stage III      Cataract     Chronic idiopathic gout of multiple sites     Chronic kidney disease, stage 3     COPD (chronic obstructive pulmonary disease)     Erectile dysfunction     Gout     Hemorrhoids without complication     Hyperlipidemia     Morbid obesity      Obstructive sleep apnea on CPAP     Stroke 2016, 2006    Thalamic infarct, acute (right) 1/2016    Type 2 DM with CKD stage 3 and hypertension     On pravastatin for cardiovascular protection.        Past Surgical History:   Procedure Laterality Date    CARPAL TUNNEL RELEASE Left 2/19/2020    Procedure: RELEASE, CARPAL TUNNEL LEFT;  Surgeon: Maria Luisa Mccurdy MD;  Location: [facility] OR;  Service: Orthopedics;  Laterality: Left;    EPIDURAL STEROID INJECTION N/A 5/2/2019    Procedure: Injection, Steroid, Epidural Cervical;  Surgeon: Dariel Doan Jr., MD;  Location: [facility] ENDO;  Service: Pain Management;  Laterality: N/A;  Cervical Epidural Steroid Injection     [id number]    Arrive @ 1130; ASA; Check BG    EPIDURAL STEROID INJECTION Bilateral 5/29/2019    Procedure: Lumbar Medial Branch Blocks;  Surgeon: Dariel Doan Jr., MD;  Location: [facility] ENDO;  Service: Pain Management;  Laterality: Bilateral;  Bilateral Lumbar Medial Branch Blocks L3, L4, L5    [id number]  [id number]    Attive @ 1030 (11 arrival request); NO Sedation; ASA; Check BG    EPIDURAL STEROID INJECTION Bilateral 7/3/2019    Procedure: Lumbar Medial Branch Blocks;  Surgeon: Dariel Doan Jr., MD;  Location: [facility] ENDO;  Service: Pain Management;  Laterality: Bilateral;  Bilateral Lumbar Medial Branch Blocks L3, L4, L5    [id number]  [id number]    Arrive @ 0930; NO Sedation; ASA; Check BG    EPIDURAL STEROID INJECTION N/A 8/7/2019    Procedure: Injection, Steroid, Epidural Cervical;  Surgeon: Dariel Doan Jr., MD;  Location: [facility] ENDO;  Service: Pain Management;  Laterality: N/A;  Cervical Epidural Steroid Injection C7-T1    [id number]    Arrive @ 1115; Last ASA 7/30; Check BG    NO PAST SURGERIES         Time Tracking:     OT Date of Treatment: 02/28/20  OT Start Time: 0846  OT Stop Time: 0925  OT Total Time (min): 39 min    Billable Minutes:Evaluation 15 minutes  Therapeutic Activity 24 minutes    BLANCA Crisostomo  2/28/2020

## 2020-02-28 NOTE — PLAN OF CARE
Pt evaluated and appropriate goals established. Franny Dalton, LEONCIO   Problem: Physical Therapy Goal  Goal: Physical Therapy Goal  Description  Goals to be met by: 3/13/2020     Patient will increase functional independence with mobility by performin. Supine to sit with Moderate Assistance  2. Sit to supine with Moderate Assistance  3. Sit to stand transfer with Moderate Assistance  4. Bed to chair transfer with Moderate Assistance using LRAD  5. Gait  x 25 feet with Moderate Assistance using LRAD.   6. Lower extremity exercise program x15 reps per handout, with assistance as needed     Outcome: Ongoing, Progressing

## 2020-02-28 NOTE — SUBJECTIVE & OBJECTIVE
Interval History:   No acute events overnight. Patient significantly improved today and is able to move his extremities some without as much pain in his joints. He was able to sit on the side of the best with some assistance by PT/OT.  He has been afebrile since 4pm yesterday. 2/4 bottles of blood cultures staining for gram positive cocci in clusters resembling staph.     Current Facility-Administered Medications   Medication Frequency    0.9%  NaCl infusion Continuous    acetaminophen tablet 650 mg Q4H PRN    albuterol-ipratropium 2.5 mg-0.5 mg/3 mL nebulizer solution 3 mL Q4H PRN    dextrose 10% (D10W) Bolus PRN    dextrose 10% (D10W) Bolus PRN    diltiaZEM 24 hr capsule 240 mg Daily    fluticasone furoate-vilanterol 100-25 mcg/dose diskus inhaler 1 puff Daily    gabapentin capsule 600 mg TID    glucagon (human recombinant) injection 1 mg PRN    glucose chewable tablet 16 g PRN    glucose chewable tablet 24 g PRN    heparin (porcine) injection 5,000 Units Q8H    insulin aspart U-100 pen 0-5 Units QID (AC + HS) PRN    ondansetron injection 4 mg Q8H PRN    oxyCODONE immediate release tablet 10 mg Q6H PRN    oxyCODONE immediate release tablet 5 mg Q6H PRN    senna-docusate 8.6-50 mg per tablet 1 tablet BID    sodium chloride 0.9% flush 10 mL PRN    vancomycin - pharmacy to dose pharmacy to manage frequency     Facility-Administered Medications Ordered in Other Encounters   Medication Frequency    0.9%  NaCl infusion Continuous     Objective:     Vital Signs (Most Recent):  Temp: 98.9 °F (37.2 °C) (02/28/20 0751)  Pulse: 91 (02/28/20 0751)  Resp: 18 (02/28/20 0751)  BP: 132/69 (02/28/20 0751)  SpO2: 97 % (02/28/20 0751)  O2 Device (Oxygen Therapy): room air (02/28/20 0252) Vital Signs (24h Range):  Temp:  [98.9 °F (37.2 °C)-101.4 °F (38.6 °C)] 98.9 °F (37.2 °C)  Pulse:  [] 91  Resp:  [17-19] 18  SpO2:  [92 %-100 %] 97 %  BP: (129-138)/(57-80) 132/69     Weight: (!) 148 kg (326 lb 4.5 oz)  (02/27/20 0159)  Body mass index is 48.18 kg/m².  Body surface area is 2.68 meters squared.      Intake/Output Summary (Last 24 hours) at 2/28/2020 1112  Last data filed at 2/28/2020 0500  Gross per 24 hour   Intake 1382 ml   Output 1400 ml   Net -18 ml       Physical Exam   Constitutional: He is oriented to person, place, and time and well-developed, well-nourished, and in no distress.   He appears more awake, comfortable and less acutely ill today compared to yesterday   HENT:   Head: Normocephalic and atraumatic.   Eyes: Conjunctivae and EOM are normal. Pupils are equal, round, and reactive to light. No scleral icterus.   Neck: Normal range of motion. Neck supple.   Cardiovascular: Normal rate, regular rhythm, normal heart sounds and intact distal pulses.  Exam reveals no gallop and no friction rub.    No murmur heard.  Pulmonary/Chest: Effort normal and breath sounds normal. No respiratory distress. He has no wheezes. He has no rales.   Abdominal: Soft. Bowel sounds are normal. He exhibits no distension. There is no tenderness.   Lymphadenopathy:     He has no cervical adenopathy.   Neurological: He is alert and oriented to person, place, and time.   Improvement in strength in his extremities 2/2 improvement in pain   Synovitis present in joints of elbows, hands, knees, and feet   Skin: Skin is warm and dry.     Musculoskeletal: Normal range of motion. He exhibits no edema.         Significant Labs:  Blood Culture: No results for input(s): LABBLOO in the last 24 hours.  CBC:   Recent Labs   Lab 02/28/20 0627   WBC 16.46*   HGB 10.0*   HCT 32.9*        CMP:   Recent Labs   Lab 02/28/20 0627   *   CALCIUM 9.0   ALBUMIN 1.8*   PROT 6.9      K 4.5   CO2 21*      BUN 58*   CREATININE 1.8*   ALKPHOS 119   *   *   BILITOT 2.1*     CPK:   Recent Labs   Lab 02/28/20 0627   CPK 1500*     ESR: No results for input(s): SEDRATE in the last 24 hours.    Significant Imaging:  US left  upper extremity with no evidence of DVT

## 2020-02-28 NOTE — PROGRESS NOTES
Pharmacokinetic Initial Assessment: IV Vancomycin    Assessment/Plan:  · Vancomycin initiated with loading dose of 2250mg x1  · Changing from maintenance regimen to pulse dosing due to KISHA  · Scr 2.2 > 2.0 >1.8 (baseline ~1.5)  · Obtain random level with AM labs 2/29  · May consider re-dosing if level < 20 mcg/mL    Pharmacy will continue to follow and monitor vancomycin.      Please contact pharmacy at extension [phone] with any questions regarding this assessment.     Thank you for the consult,   Mindy Laurent       Patient brief summary:  Bj Pate Jr. is a 70 y.o. male initiated on antimicrobial therapy with IV Vancomycin for treatment of suspected bacteremia    Drug Allergies:   Review of patient's allergies indicates:   Allergen Reactions    Tomato (solanum lycopersicum) Hives    Naproxen Hives    Shrimp Other (See Comments)       Actual Body Weight:   148 kg    Renal Function:   Estimated Creatinine Clearance: 50.8 mL/min (A) (based on SCr of 2 mg/dL (H)).,       CBC (last 72 hours):  Recent Labs   Lab Result Units 02/26/20 1857 02/27/20 0441   WBC K/uL 17.69* 18.22*   Hemoglobin g/dL 11.5* 10.1*   Hematocrit % 37.0* 32.7*   Platelets K/uL 256 255   Gran% % 80.1* 79.7*   Lymph% % 3.2* 4.9*   Mono% % 15.4* 14.6   Eosinophil% % 0.0 0.0   Basophil% % 0.1 0.1   Differential Method  Automated Automated       Metabolic Panel (last 72 hours):  Recent Labs   Lab Result Units 02/26/20 1857 02/26/20 1858 02/27/20 0223 02/27/20  0441   Sodium mmol/L 136  --   --  137   Sodium Urine Random mmol/L  --   --  25  --    Potassium mmol/L 4.4  --   --  4.2   Chloride mmol/L 101  --   --  103   CO2 mmol/L 22*  --   --  24   Glucose mg/dL 140*  --   --  110   Glucose, UA   --  Negative  --   --    BUN, Bld mg/dL 66*  --   --  68*   Creatinine mg/dL 2.2*  --   --  2.0*   Albumin g/dL 2.6*  --   --  2.2*   Total Bilirubin mg/dL 2.3*  --   --  2.0*   Alkaline Phosphatase U/L 120  --   --  114   AST U/L 234*  --   --  178*    ALT U/L 165*  --   --  133*   Magnesium mg/dL 2.3  --   --  2.2   Phosphorus mg/dL  --   --   --  3.5       Drug levels (last 3 results):  No results for input(s): VANCOMYCINRA, VANCOMYCINPE, VANCOMYCINTR in the last 72 hours.    Microbiologic Results:  Microbiology Results (last 7 days)     Procedure Component Value Units Date/Time    Culture, Anaerobe [[id number]] Collected:  02/27/20 1629    Order Status:  Completed Specimen:  Body Fluid from Knee, Right Updated:  02/28/20 0720     Anaerobic Culture Culture in progress    Aerobic culture [[id number]] Collected:  02/27/20 1629    Order Status:  Completed Specimen:  Body Fluid from Knee, Right Updated:  02/28/20 0719     Aerobic Bacterial Culture No growth    Blood culture [[id number]] Collected:  02/28/20 0627    Order Status:  Sent Specimen:  Blood Updated:  02/28/20 0654    Blood culture [[id number]] Collected:  02/28/20 0627    Order Status:  Sent Specimen:  Blood Updated:  02/28/20 0654    Blood culture #2 **CANNOT BE ORDERED STAT** [[id number]] Collected:  02/26/20 2357    Order Status:  Completed Specimen:  Blood from Peripheral, Forearm, Left Updated:  02/28/20 0613     Blood Culture, Routine No Growth to date      No Growth to date    Blood culture #1 **CANNOT BE ORDERED STAT** [[id number]] Collected:  02/26/20 2346    Order Status:  Completed Specimen:  Blood from Peripheral, Hand, Right Updated:  02/28/20 0321     Blood Culture, Routine Gram stain aer bottle: Gram positive cocci in clusters resembling Staph       Gram stain luis eduardo bottle: Gram positive cocci in clusters resembling Staph       Results called to and read back by: Millie Reyes RN 02/28/2020        03:20    Respiratory Infection Panel, Nasopharyngeal [[id number]] Collected:  02/27/20 1904    Order Status:  Completed Specimen:  Nasopharyngeal Swab Updated:  02/28/20 0001     Respiratory Infection Panel Source NP Swab     Adenovirus Not Detected     Coronavirus 229E, Common Cold Virus Not  Detected     Coronavirus HKU1, Common Cold Virus Not Detected     Coronavirus NL63, Common Cold Virus Not Detected     Coronavirus OC43, Common Cold Virus Not Detected     Comment: The Coronavirus strains detected in this test cause the common cold.  These strains are not the COVID-19 (novel Coronavirus)strain   associated with the respiratory disease outbreak.          Human Metapneumovirus Not Detected     Human Rhinovirus/Enterovirus Not Detected     Influenza A (subtypes H1, H1-2009,H3) Not Detected     Influenza B Not Detected     Parainfluenza Virus 1 Not Detected     Parainfluenza Virus 2 Not Detected     Parainfluenza Virus 3 Not Detected     Parainfluenza Virus 4 Not Detected     Respiratory Syncytial Virus Not Detected     Bordetella Parapertussis (CK0051) Not Detected     Bordetella pertussis (ptxP) Not Detected     Chlamydia pneumoniae Not Detected     Mycoplasma pneumoniae Not Detected     Comment: Respiratory Infection Panel testing performed by Multiplex PCR.       Narrative:       For all other respiratory sources order [id number] Respiratory  Viral Panel by PCR (RVPCR)    Respiratory Infection Panel, Nasopharyngeal [[id number]] Collected:  02/27/20 1713    Order Status:  Canceled Specimen:  Nasopharyngeal Swab     Influenza A & B by Molecular [[id number]] Collected:  02/27/20 1631    Order Status:  Sent Specimen:  Nasopharyngeal Swab Updated:  02/27/20 1631    C. trachomatis/N. gonorrhoeae by AMP DNA [facility]; Urine [[id number]]     Order Status:  No result Specimen:  Genital

## 2020-02-29 LAB
ALBUMIN SERPL BCP-MCNC: 1.8 G/DL (ref 3.5–5.2)
ALP SERPL-CCNC: 167 U/L (ref 55–135)
ALT SERPL W/O P-5'-P-CCNC: 329 U/L (ref 10–44)
ANION GAP SERPL CALC-SCNC: 9 MMOL/L (ref 8–16)
AST SERPL-CCNC: 623 U/L (ref 10–40)
BASOPHILS # BLD AUTO: 0.03 K/UL (ref 0–0.2)
BASOPHILS NFR BLD: 0.1 % (ref 0–1.9)
BILIRUB SERPL-MCNC: 1.1 MG/DL (ref 0.1–1)
BNP SERPL-MCNC: 15 PG/ML (ref 0–99)
BUN SERPL-MCNC: 59 MG/DL (ref 8–23)
CALCIUM SERPL-MCNC: 9.1 MG/DL (ref 8.7–10.5)
CHLORIDE SERPL-SCNC: 105 MMOL/L (ref 95–110)
CK SERPL-CCNC: 607 U/L (ref 20–200)
CO2 SERPL-SCNC: 23 MMOL/L (ref 23–29)
CREAT SERPL-MCNC: 1.6 MG/DL (ref 0.5–1.4)
DIFFERENTIAL METHOD: ABNORMAL
EOSINOPHIL # BLD AUTO: 0 K/UL (ref 0–0.5)
EOSINOPHIL NFR BLD: 0 % (ref 0–8)
ERYTHROCYTE [DISTWIDTH] IN BLOOD BY AUTOMATED COUNT: 14.8 % (ref 11.5–14.5)
EST. GFR  (AFRICAN AMERICAN): 50.4 ML/MIN/1.73 M^2
EST. GFR  (NON AFRICAN AMERICAN): 43.6 ML/MIN/1.73 M^2
GLUCOSE SERPL-MCNC: 215 MG/DL (ref 70–110)
HCT VFR BLD AUTO: 33.4 % (ref 40–54)
HGB BLD-MCNC: 10.3 G/DL (ref 14–18)
IMM GRANULOCYTES # BLD AUTO: 0.96 K/UL (ref 0–0.04)
IMM GRANULOCYTES NFR BLD AUTO: 4.3 % (ref 0–0.5)
LYMPHOCYTES # BLD AUTO: 0.7 K/UL (ref 1–4.8)
LYMPHOCYTES NFR BLD: 3.2 % (ref 18–48)
MAGNESIUM SERPL-MCNC: 2.2 MG/DL (ref 1.6–2.6)
MCH RBC QN AUTO: 27.7 PG (ref 27–31)
MCHC RBC AUTO-ENTMCNC: 30.8 G/DL (ref 32–36)
MCV RBC AUTO: 90 FL (ref 82–98)
MONOCYTES # BLD AUTO: 1.6 K/UL (ref 0.3–1)
MONOCYTES NFR BLD: 7.4 % (ref 4–15)
NEUTROPHILS # BLD AUTO: 19 K/UL (ref 1.8–7.7)
NEUTROPHILS NFR BLD: 85 % (ref 38–73)
NRBC BLD-RTO: 0 /100 WBC
PATH INTERP FLD-IMP: NORMAL
PHOSPHATE SERPL-MCNC: 3.1 MG/DL (ref 2.7–4.5)
PLATELET # BLD AUTO: 323 K/UL (ref 150–350)
PMV BLD AUTO: 10.6 FL (ref 9.2–12.9)
POCT GLUCOSE: 192 MG/DL (ref 70–110)
POCT GLUCOSE: 205 MG/DL (ref 70–110)
POCT GLUCOSE: 232 MG/DL (ref 70–110)
POCT GLUCOSE: 237 MG/DL (ref 70–110)
POTASSIUM SERPL-SCNC: 4.7 MMOL/L (ref 3.5–5.1)
PROT SERPL-MCNC: 7 G/DL (ref 6–8.4)
RBC # BLD AUTO: 3.72 M/UL (ref 4.6–6.2)
SODIUM SERPL-SCNC: 137 MMOL/L (ref 136–145)
VANCOMYCIN SERPL-MCNC: 9.6 UG/ML
WBC # BLD AUTO: 22.29 K/UL (ref 3.9–12.7)

## 2020-02-29 PROCEDURE — 80202 ASSAY OF VANCOMYCIN: CPT

## 2020-02-29 PROCEDURE — 82550 ASSAY OF CK (CPK): CPT

## 2020-02-29 PROCEDURE — 94761 N-INVAS EAR/PLS OXIMETRY MLT: CPT

## 2020-02-29 PROCEDURE — 99231 SBSQ HOSP IP/OBS SF/LOW 25: CPT | Mod: ,,, | Performed by: INTERNAL MEDICINE

## 2020-02-29 PROCEDURE — 11000001 HC ACUTE MED/SURG PRIVATE ROOM

## 2020-02-29 PROCEDURE — 99231 PR SUBSEQUENT HOSPITAL CARE,LEVL I: ICD-10-PCS | Mod: ,,, | Performed by: INTERNAL MEDICINE

## 2020-02-29 PROCEDURE — 85025 COMPLETE CBC W/AUTO DIFF WBC: CPT

## 2020-02-29 PROCEDURE — 80053 COMPREHEN METABOLIC PANEL: CPT

## 2020-02-29 PROCEDURE — C9399 UNCLASSIFIED DRUGS OR BIOLOG: HCPCS | Performed by: INTERNAL MEDICINE

## 2020-02-29 PROCEDURE — 83880 ASSAY OF NATRIURETIC PEPTIDE: CPT

## 2020-02-29 PROCEDURE — 63600175 PHARM REV CODE 636 W HCPCS: Performed by: INTERNAL MEDICINE

## 2020-02-29 PROCEDURE — 84100 ASSAY OF PHOSPHORUS: CPT

## 2020-02-29 PROCEDURE — 36415 COLL VENOUS BLD VENIPUNCTURE: CPT

## 2020-02-29 PROCEDURE — 25000003 PHARM REV CODE 250: Performed by: FAMILY MEDICINE

## 2020-02-29 PROCEDURE — 83735 ASSAY OF MAGNESIUM: CPT

## 2020-02-29 PROCEDURE — 63600175 PHARM REV CODE 636 W HCPCS: Performed by: FAMILY MEDICINE

## 2020-02-29 PROCEDURE — 94660 CPAP INITIATION&MGMT: CPT

## 2020-02-29 PROCEDURE — 25000003 PHARM REV CODE 250: Performed by: INTERNAL MEDICINE

## 2020-02-29 PROCEDURE — 99900035 HC TECH TIME PER 15 MIN (STAT)

## 2020-02-29 RX ORDER — COLCHICINE 0.6 MG/1
0.6 TABLET, FILM COATED ORAL DAILY
Status: DISCONTINUED | OUTPATIENT
Start: 2020-02-29 | End: 2020-03-02 | Stop reason: HOSPADM

## 2020-02-29 RX ORDER — SODIUM CHLORIDE 9 MG/ML
INJECTION, SOLUTION INTRAVENOUS CONTINUOUS
Status: DISCONTINUED | OUTPATIENT
Start: 2020-02-29 | End: 2020-02-29

## 2020-02-29 RX ORDER — VANCOMYCIN 2 GRAM/500 ML IN 0.9 % SODIUM CHLORIDE INTRAVENOUS
2000
Status: DISCONTINUED | OUTPATIENT
Start: 2020-02-29 | End: 2020-02-29

## 2020-02-29 RX ADMIN — GABAPENTIN 600 MG: 300 CAPSULE ORAL at 08:02

## 2020-02-29 RX ADMIN — SODIUM CHLORIDE: 0.9 INJECTION, SOLUTION INTRAVENOUS at 08:02

## 2020-02-29 RX ADMIN — INSULIN DETEMIR 5 UNITS: 100 INJECTION, SOLUTION SUBCUTANEOUS at 09:02

## 2020-02-29 RX ADMIN — GABAPENTIN 600 MG: 300 CAPSULE ORAL at 03:02

## 2020-02-29 RX ADMIN — DILTIAZEM HYDROCHLORIDE 240 MG: 240 CAPSULE, COATED, EXTENDED RELEASE ORAL at 08:02

## 2020-02-29 RX ADMIN — FLUTICASONE FUROATE AND VILANTEROL TRIFENATATE 1 PUFF: 100; 25 POWDER RESPIRATORY (INHALATION) at 08:02

## 2020-02-29 RX ADMIN — HEPARIN SODIUM 5000 UNITS: 5000 INJECTION, SOLUTION INTRAVENOUS; SUBCUTANEOUS at 05:02

## 2020-02-29 RX ADMIN — VANCOMYCIN HYDROCHLORIDE 2000 MG: 100 INJECTION, POWDER, LYOPHILIZED, FOR SOLUTION INTRAVENOUS at 09:02

## 2020-02-29 RX ADMIN — INSULIN ASPART 2 UNITS: 100 INJECTION, SOLUTION INTRAVENOUS; SUBCUTANEOUS at 05:02

## 2020-02-29 RX ADMIN — INSULIN ASPART 2 UNITS: 100 INJECTION, SOLUTION INTRAVENOUS; SUBCUTANEOUS at 08:02

## 2020-02-29 RX ADMIN — HEPARIN SODIUM 5000 UNITS: 5000 INJECTION, SOLUTION INTRAVENOUS; SUBCUTANEOUS at 09:02

## 2020-02-29 RX ADMIN — INSULIN ASPART 2 UNITS: 100 INJECTION, SOLUTION INTRAVENOUS; SUBCUTANEOUS at 12:02

## 2020-02-29 RX ADMIN — SENNOSIDES AND DOCUSATE SODIUM 1 TABLET: 8.6; 5 TABLET ORAL at 09:02

## 2020-02-29 RX ADMIN — COLCHICINE 0.6 MG: 0.6 TABLET, FILM COATED ORAL at 03:02

## 2020-02-29 RX ADMIN — HEPARIN SODIUM 5000 UNITS: 5000 INJECTION, SOLUTION INTRAVENOUS; SUBCUTANEOUS at 03:02

## 2020-02-29 RX ADMIN — INSULIN DETEMIR 5 UNITS: 100 INJECTION, SOLUTION SUBCUTANEOUS at 08:02

## 2020-02-29 RX ADMIN — PREDNISONE 40 MG: 10 TABLET ORAL at 08:02

## 2020-02-29 RX ADMIN — SENNOSIDES AND DOCUSATE SODIUM 1 TABLET: 8.6; 5 TABLET ORAL at 08:02

## 2020-02-29 RX ADMIN — GABAPENTIN 600 MG: 300 CAPSULE ORAL at 09:02

## 2020-02-29 NOTE — ASSESSMENT & PLAN NOTE
Bj Pate Jr. is a 70 y.o. male status post left wrist carpal tunnel release by Dr. shine alex on February 19th.  Now with new onset left wrist pain since surgery. History of chronic gout in multiple joints however he a he has not had this in the left wrist as of yet.    - left wrist aspirated with .5 cc of is gouty tophaceous fluid on aspiration.  Labs positive for intracellular and extracellular gouty crystals consistent with gout.  Intra-articular corticosteroid was then injected into the wrist. To follow-up with rheumatology for chronic gout treatment as he is not adequately treated with allopurinol.  Will follow cultures.    Procedure Note:  After consent was obtained, using sterile technique the left wrist joint was entered and .5 cc of gouty colored fluid was withdrawn and sent for cell count, /cbc, culture and crystals. Sterile dressing was applied. The procedure was well tolerated.

## 2020-02-29 NOTE — NURSING
Pt transferred to inpatient unit room 646. Report called to BAILEE Thompson. Care assumed per Nurse. Pt Left in hospital bed per hospital transport and CNA; fluids infusing without difficulty. Pt belongings place in patient belongings bag; cellphone, , sprite, and cake. Pt tolerated well.   Repiratory at the bedside informed of Pt new inpatient assignment. RT notified to apply CPAP on 6 floor unit. Pt stable and AAOx 4. Pt NAD; respirations 18, even, and unlabored. Rivera inplace no sign of obstruction  100 ml of yellow urine in drainage bag.

## 2020-02-29 NOTE — PLAN OF CARE
POC reviewed with pt. AAO x4.  Pt remained free from falls. Rivera catheter D/C'ed. Questions and concerns addressed. Pt progressing towards goals. Will continue to monitor. See flow sheets for full assessment and VS

## 2020-02-29 NOTE — PROGRESS NOTES
Pharmacokinetic Assessment Follow Up: IV Vancomycin    Vancomycin Assessment Plan:  - Vanc random this AM 9.6 mcg/mL (~24hr level)   - Scr trending back toward baseline of 1.5 (it's 1.6 today)  - Given pt w/ bacteremia, and level <10 mcg/mL will schedule Vanc 2000 mg Q24H starting today  - Vanc level prior to 3rd dose of regimen on 3/2 @ 0930     Drug levels (last 3 results):  Recent Labs   Lab Result Units 02/29/20  0507   Vancomycin, Random ug/mL 9.6       Pharmacy will continue to follow and monitor vancomycin.    Please contact pharmacy at extension [phone] for questions regarding this assessment.    Thank you for the consult,   Jumana Whitlock       Patient brief summary:  Bj Pate Jr. is a 70 y.o. male initiated on antimicrobial therapy with IV Vancomycin for treatment of bacteremia    Drug Allergies:   Review of patient's allergies indicates:   Allergen Reactions    Tomato (solanum lycopersicum) Hives    Naproxen Hives    Shrimp Other (See Comments)       Actual Body Weight:   153.3 kg    Renal Function:   Estimated Creatinine Clearance: 64.8 mL/min (A) (based on SCr of 1.6 mg/dL (H)).,     CBC (last 72 hours):  Recent Labs   Lab Result Units 02/26/20 1857 02/27/20 0441 02/28/20 0627 02/29/20  0507   WBC K/uL 17.69* 18.22* 16.46* 22.29*   Hemoglobin g/dL 11.5* 10.1* 10.0* 10.3*   Hematocrit % 37.0* 32.7* 32.9* 33.4*   Platelets K/uL 256 255 274 323   Gran% % 80.1* 79.7* 92.6* 85.0*   Lymph% % 3.2* 4.9* 2.6* 3.2*   Mono% % 15.4* 14.6 3.6* 7.4   Eosinophil% % 0.0 0.0 0.0 0.0   Basophil% % 0.1 0.1 0.1 0.1   Differential Method  Automated Automated Automated Automated       Metabolic Panel (last 72 hours):  Recent Labs   Lab Result Units 02/26/20 1857 02/26/20  1858 02/27/20  0223 02/27/20 0441 02/28/20 0627 02/29/20  0507   Sodium mmol/L 136  --   --  137 136 137   Sodium Urine Random mmol/L  --   --  25  --   --   --    Potassium mmol/L 4.4  --   --  4.2 4.5 4.7   Chloride mmol/L 101  --   --  103  104 105   CO2 mmol/L 22*  --   --  24 21* 23   Glucose mg/dL 140*  --   --  110 215* 215*   Glucose, UA   --  Negative  --   --   --   --    BUN, Bld mg/dL 66*  --   --  68* 58* 59*   Creatinine mg/dL 2.2*  --   --  2.0* 1.8* 1.6*   Albumin g/dL 2.6*  --   --  2.2* 1.8* 1.8*   Total Bilirubin mg/dL 2.3*  --   --  2.0* 2.1* 1.1*   Alkaline Phosphatase U/L 120  --   --  114 119 167*   AST U/L 234*  --   --  178* 216* 623*   ALT U/L 165*  --   --  133* 141* 329*   Magnesium mg/dL 2.3  --   --  2.2 2.2 2.2   Phosphorus mg/dL  --   --   --  3.5 4.0 3.1       Vancomycin Administrations:  vancomycin given in the last 96 hours                   vancomycin (VANCOCIN) 2,250 mg in dextrose 5 % 500 mL IVPB (mg) 2,250 mg New Bag 02/28/20 0431                Microbiologic Results:  Microbiology Results (last 7 days)     Procedure Component Value Units Date/Time    Blood culture [[id number]] Collected:  02/28/20 0627    Order Status:  Completed Specimen:  Blood Updated:  02/29/20 0812     Blood Culture, Routine No Growth to date      No Growth to date    Blood culture [[id number]] Collected:  02/28/20 0627    Order Status:  Completed Specimen:  Blood Updated:  02/29/20 0812     Blood Culture, Routine No Growth to date      No Growth to date    Aerobic culture [[id number]] Collected:  02/28/20 1639    Order Status:  Completed Specimen:  Joint Fluid from Wrist, Left Updated:  02/29/20 0748     Aerobic Bacterial Culture No growth    Blood culture #2 **CANNOT BE ORDERED STAT** [[id number]] Collected:  02/26/20 1487    Order Status:  Completed Specimen:  Blood from Peripheral, Forearm, Left Updated:  02/29/20 0613     Blood Culture, Routine No Growth to date      No Growth to date      No Growth to date    Gram stain [[id number]] Collected:  02/28/20 1639    Order Status:  Completed Specimen:  Joint Fluid from Wrist, Left Updated:  02/28/20 8936     Gram Stain Result No WBC's      No organisms seen    AFB Culture & Smear [[id number]]  Collected:  02/28/20 1639    Order Status:  Sent Specimen:  Joint Fluid from Wrist, Left Updated:  02/28/20 1701    Fungus culture [[id number]] Collected:  02/28/20 1639    Order Status:  Sent Specimen:  Joint Fluid from Wrist, Left Updated:  02/28/20 1701    Culture, Anaerobic [[id number]] Collected:  02/28/20 1639    Order Status:  Sent Specimen:  Joint Fluid from Wrist, Left Updated:  02/28/20 1701    Culture, Anaerobe [[id number]] Collected:  02/27/20 1629    Order Status:  Completed Specimen:  Body Fluid from Knee, Right Updated:  02/28/20 0720     Anaerobic Culture Culture in progress    Aerobic culture [[id number]] Collected:  02/27/20 1629    Order Status:  Completed Specimen:  Body Fluid from Knee, Right Updated:  02/28/20 0719     Aerobic Bacterial Culture No growth    Blood culture #1 **CANNOT BE ORDERED STAT** [[id number]] Collected:  02/26/20 2346    Order Status:  Completed Specimen:  Blood from Peripheral, Hand, Right Updated:  02/28/20 0321     Blood Culture, Routine Gram stain aer bottle: Gram positive cocci in clusters resembling Staph       Gram stain luis eduardo bottle: Gram positive cocci in clusters resembling Staph       Results called to and read back by: Millie Reyes RN 02/28/2020        03:20    Respiratory Infection Panel, Nasopharyngeal [[id number]] Collected:  02/27/20 1904    Order Status:  Completed Specimen:  Nasopharyngeal Swab Updated:  02/28/20 0001     Respiratory Infection Panel Source NP Swab     Adenovirus Not Detected     Coronavirus 229E, Common Cold Virus Not Detected     Coronavirus HKU1, Common Cold Virus Not Detected     Coronavirus NL63, Common Cold Virus Not Detected     Coronavirus OC43, Common Cold Virus Not Detected     Comment: The Coronavirus strains detected in this test cause the common cold.  These strains are not the COVID-19 (novel Coronavirus)strain   associated with the respiratory disease outbreak.          Human Metapneumovirus Not Detected     Human  Rhinovirus/Enterovirus Not Detected     Influenza A (subtypes H1, H1-2009,H3) Not Detected     Influenza B Not Detected     Parainfluenza Virus 1 Not Detected     Parainfluenza Virus 2 Not Detected     Parainfluenza Virus 3 Not Detected     Parainfluenza Virus 4 Not Detected     Respiratory Syncytial Virus Not Detected     Bordetella Parapertussis (DO2973) Not Detected     Bordetella pertussis (ptxP) Not Detected     Chlamydia pneumoniae Not Detected     Mycoplasma pneumoniae Not Detected     Comment: Respiratory Infection Panel testing performed by Multiplex PCR.       Narrative:       For all other respiratory sources order [id number] Respiratory  Viral Panel by PCR (RVPCR)    Respiratory Infection Panel, Nasopharyngeal [[id number]] Collected:  02/27/20 1713    Order Status:  Canceled Specimen:  Nasopharyngeal Swab     Influenza A & B by Molecular [[id number]] Collected:  02/27/20 1631    Order Status:  Sent Specimen:  Nasopharyngeal Swab Updated:  02/27/20 1631    C. trachomatis/N. gonorrhoeae by AMP DNA [facility]; Urine [[id number]]     Order Status:  No result Specimen:  Genital

## 2020-02-29 NOTE — PLAN OF CARE
Welcomed pt to unit and room. Oriented to both. No complaints voiced , no falls or injuries. Painful to touch pt. Pt not able to bend arms to bring drink to mouth - will need to be feed. Urine output isnt greater than input - iv fluids at 150 . Will continue to monitor. Pt doesn't want to be turned because it hurts

## 2020-02-29 NOTE — PROGRESS NOTES
Therapy with vancomycin complete and/or consult discontinued by provider.  Pharmacy will sign off, please re-consult as needed.    Shivani Jha, PharmD  [phone]

## 2020-02-29 NOTE — CARE UPDATE
Care update:  Ortho saw patient yesterday. Aspirated white chalky material from left wrist. S/p steroid injection into left wrist as well.   Patient is AF, HD stable. Cr improving   On physical exam he is improving in his joint mobility  PLAN: continue prednisone 40mg daily at this time, since renal function has improved would start colchicine 0.6mg daily.   Pt is AA and will need HLA  checked prior to starting allopurinol due to high risk of allopurinol hypersensitivity. This can be checked as an outpatient.

## 2020-02-29 NOTE — PLAN OF CARE
Pt with no falls or injuries this shift. Pt standing at side of bed with pt. Pt with no skin breakdown. Pt repositioned frequently. Pt reports 6/10 pain post pain med. Pt afebrile, cultures from L wrist sent for cultures by orthopedics.

## 2020-02-29 NOTE — CONSULTS
Inpatient consult to Physical Medicine Rehab  Consult performed by: BRANNON Gould  Consult ordered by: Guilherme Anderson MD  Reason for consult: rehab evaluation      Consult received.  Reviewed patient history and current admission.  Rehab team following.      SUREKHA Gould, BRANNON-C  Physical Medicine & Rehabilitation   02/28/2020

## 2020-02-29 NOTE — NURSING
Called inpatient unit 6th floor to givr report; spoken to BAILEE Thompson. Will call back for report once available.

## 2020-02-29 NOTE — PROGRESS NOTES
[facility] Hospital Medicine                                                                     Progress Note     Team: [facility]P MED G Guilherme Anderson MD   Admit Date: 2/26/2020   Hospital Day: 2  GLORIA: 3/2/2020   Code status: Full Code   Principal Problem: Polyarticular gout     SUMMARY:     71yo male with a past medical history of CVA without residual deficit, COPD, HILARIO on CPAP, Gout, DDD of Lumbar, Cervical Radiculopathy, HTN and recent Left CTS surgery presented with rapid onset polyarthritis of his UE and LE with associated fever, fatigue and decreased po intake. Presented to multiple ED's in the week with negative work up, thus sent home. He attributes significant weakness secondary to pain. Pain rendered him immobile for almost 2 day unsupervised at home (he was unable to get out of his chair). Physical exam with asymmetric swelling on his UE and LE. Labs noting leukocytosis 17, Cr 2.2 (baseline 1.5), , CPK 2343, . CT A/P unremarkable. Admitted to hospital medicine. Rheumatology consulted, suspecting polyarticular gout. S/p bedside arthrocentesis by Rhem positive for urate crystals confirmed diagnosis. Fever likely from acute gout flare. Elevated CPK suspected from prolonged immobility. AUS and acute hep negative. Suspecting LFT elevation from rhabdo. Rheum rec IV steroids (IV solumedrol 80 mg x 3 days), and adding colchicine when renal function improved. Left UE with no DVT. Blood cultures 2/4 from admission day returned positive for staph. Patient was subsequently started on vancomycin and repeat bcx ordered, NGTD. Rheumatology rec switched IV solumedrol to po 40 steroids considering his possible bacteremia. Ortho consulted for bacteremia with recent wrist surgery. Continue fluids and IV vancomycin for now. Supportive care. Ortho aspirated his  left wrist s/p. Labs positive for intracellular and extracellular gouty crystals consistent with gout. Unable to before cell count as specimen was solid. Intra-articular corticosteroid was then injected into the wrist. Ortho rec follow up Dr. Arora OP. BCx returned as contaminant. D/c vancomycin. Started colchicine as per rheumatology as his renal function is now back to baseline.      SUBJECTIVE:     Pt was seen and examined at bedside. No acute events overnight. Feels better overall. No cp, dyspnea. Joint pain improving. ROM much improved. BCx contaminant. D/c vancomycin. Continues to have fatigue. PT OT rec rehab.       ROS (Positive in Bold, otherwise negative)  Pain Scale: 0 /10   Constitutional: fever (resolved), generalized weakness, chills, night sweats  CV: chest pain, edema, palpitations  Resp: SOB, cough, sputum production  GI: changes in appetite, NVDC, pain, melena, hematochezia, GERD, hematemesis  : Dysuria, hematuria, urinary urgency, frequency  MSK: arthralgia/myalgia, joint swelling  SKIN: rashes, pruritis, petechiae   Neuro/Psych: FND, anxiety, depression      OBJECTIVE:     Vitals:  Temp:  [97.5 °F (36.4 °C)-98.9 °F (37.2 °C)]   Pulse:  [77-90]   Resp:  [16-20]   BP: (129-170)/(76-81)   SpO2:  [93 %-98 %]      I & O (Last 24H):     Intake/Output Summary (Last 24 hours) at 2/29/2020 1400  Last data filed at 2/29/2020 1200  Gross per 24 hour   Intake 150 ml   Output 3725 ml   Net -3575 ml         GEN: AA male  in no acute distress. Nontoxic. Resting in bed. Cooperative. Appears comfortable.  HEENT: NCAT. PERRL. EOMI. Conjunctivae/corneas clear, sclera Anicteric.  CVS: RRR. Normal s1 s2 no murmur, click, rub or gallop  LUNG: CTAB. Normal respiratory effort. Anterior exam with no wheezes, rhonchi, or crackles.  ABD: Normoactive BS, soft, NT, ND, no masses or organomegaly.  EXT: Joint swelling noted on multiple UE joints. Left arm swollen compared to right. Moves extremities better today  SKIN:  color, texture, turgor normal. No rashes or lesions  NEURO: Alert, oriented x 4, grossly normal      All recent labs and imaging has been reviewed.     Recent Results (from the past 24 hour(s))   WBC & Diff,Body Fluid Joint Fluid, Left Wrist    Collection Time: 02/28/20  4:39 PM   Result Value Ref Range    Body Fluid Type Joint Fluid, Left Wrist     Fluid Appearance Turbid     Fluid Color Other     WBC, Body Fluid SEE COMMENT /cu mm    Segs, Fluid 87 %    Lymphs, Fluid 8 %    Monocytes/Macrophages, Fluid 5 %    Body Fluid Comments SEE COMMENT    AFB Culture & Smear    Collection Time: 02/28/20  4:39 PM   Result Value Ref Range    AFB CULTURE STAIN No acid fast bacilli seen.    Gram stain    Collection Time: 02/28/20  4:39 PM   Result Value Ref Range    Gram Stain Result No WBC's     Gram Stain Result No organisms seen    Aerobic culture    Collection Time: 02/28/20  4:39 PM   Result Value Ref Range    Aerobic Bacterial Culture No growth    Body fluid crystal Joint Fluid, Left Wrist    Collection Time: 02/28/20  4:39 PM   Result Value Ref Range    Body Fluid Source, Crystal Exam Joint Fluid, Left Wrist     Body Fluid Crystal Positive (A) Negative   Pathologist Review of Laboratory Test    Collection Time: 02/28/20  4:39 PM   Result Value Ref Range    Pathologist Review, Body Fluid REVIEWED    POCT glucose    Collection Time: 02/28/20  7:06 PM   Result Value Ref Range    POCT Glucose 238 (H) 70 - 110 mg/dL   POCT glucose    Collection Time: 02/28/20 10:03 PM   Result Value Ref Range    POCT Glucose 223 (H) 70 - 110 mg/dL   Comprehensive Metabolic Panel (CMP)    Collection Time: 02/29/20  5:07 AM   Result Value Ref Range    Sodium 137 136 - 145 mmol/L    Potassium 4.7 3.5 - 5.1 mmol/L    Chloride 105 95 - 110 mmol/L    CO2 23 23 - 29 mmol/L    Glucose 215 (H) 70 - 110 mg/dL    BUN, Bld 59 (H) 8 - 23 mg/dL    Creatinine 1.6 (H) 0.5 - 1.4 mg/dL    Calcium 9.1 8.7 - 10.5 mg/dL    Total Protein 7.0 6.0 - 8.4 g/dL     Albumin 1.8 (L) 3.5 - 5.2 g/dL    Total Bilirubin 1.1 (H) 0.1 - 1.0 mg/dL    Alkaline Phosphatase 167 (H) 55 - 135 U/L     (H) 10 - 40 U/L     (H) 10 - 44 U/L    Anion Gap 9 8 - 16 mmol/L    eGFR if African American 50.4 (A) >60 mL/min/1.73 m^2    eGFR if non  43.6 (A) >60 mL/min/1.73 m^2   Magnesium    Collection Time: 02/29/20  5:07 AM   Result Value Ref Range    Magnesium 2.2 1.6 - 2.6 mg/dL   Phosphorus    Collection Time: 02/29/20  5:07 AM   Result Value Ref Range    Phosphorus 3.1 2.7 - 4.5 mg/dL   CBC with Automated Differential    Collection Time: 02/29/20  5:07 AM   Result Value Ref Range    WBC 22.29 (H) 3.90 - 12.70 K/uL    RBC 3.72 (L) 4.60 - 6.20 M/uL    Hemoglobin 10.3 (L) 14.0 - 18.0 g/dL    Hematocrit 33.4 (L) 40.0 - 54.0 %    Mean Corpuscular Volume 90 82 - 98 fL    Mean Corpuscular Hemoglobin 27.7 27.0 - 31.0 pg    Mean Corpuscular Hemoglobin Conc 30.8 (L) 32.0 - 36.0 g/dL    RDW 14.8 (H) 11.5 - 14.5 %    Platelets 323 150 - 350 K/uL    MPV 10.6 9.2 - 12.9 fL    Immature Granulocytes 4.3 (H) 0.0 - 0.5 %    Gran # (ANC) 19.0 (H) 1.8 - 7.7 K/uL    Immature Grans (Abs) 0.96 (H) 0.00 - 0.04 K/uL    Lymph # 0.7 (L) 1.0 - 4.8 K/uL    Mono # 1.6 (H) 0.3 - 1.0 K/uL    Eos # 0.0 0.0 - 0.5 K/uL    Baso # 0.03 0.00 - 0.20 K/uL    nRBC 0 0 /100 WBC    Gran% 85.0 (H) 38.0 - 73.0 %    Lymph% 3.2 (L) 18.0 - 48.0 %    Mono% 7.4 4.0 - 15.0 %    Eosinophil% 0.0 0.0 - 8.0 %    Basophil% 0.1 0.0 - 1.9 %    Differential Method Automated    CK    Collection Time: 02/29/20  5:07 AM   Result Value Ref Range     (H) 20 - 200 U/L   Vancomycin, random    Collection Time: 02/29/20  5:07 AM   Result Value Ref Range    Vancomycin, Random 9.6 Not established ug/mL   POCT glucose    Collection Time: 02/29/20  7:23 AM   Result Value Ref Range    POCT Glucose 237 (H) 70 - 110 mg/dL   POCT glucose    Collection Time: 02/29/20 12:46 PM   Result Value Ref Range    POCT Glucose 232 (H) 70 - 110  mg/dL       Recent Labs   Lab 02/28/20  0820 02/28/20  1228 02/28/20  1906 02/28/20  2203 02/29/20  0723 02/29/20  1246   POCTGLUCOSE 219* 203* 238* 223* 237* 232*       Hemoglobin A1C   Date Value Ref Range Status   08/19/2019 5.6 4.0 - 5.6 % Final     Comment:     ADA Screening Guidelines:  5.7-6.4%  Consistent with prediabetes  >or=6.5%  Consistent with diabetes  High levels of fetal hemoglobin interfere with the HbA1C  assay. Heterozygous hemoglobin variants (HbS, HgC, etc)do  not significantly interfere with this assay.   However, presence of multiple variants may affect accuracy.     01/25/2019 5.6 4.0 - 5.6 % Final     Comment:     ADA Screening Guidelines:  5.7-6.4%  Consistent with prediabetes  >or=6.5%  Consistent with diabetes  High levels of fetal hemoglobin interfere with the HbA1C  assay. Heterozygous hemoglobin variants (HbS, HgC, etc)do  not significantly interfere with this assay.   However, presence of multiple variants may affect accuracy.     07/25/2018 5.6 4.0 - 5.6 % Final     Comment:     ADA Screening Guidelines:  5.7-6.4%  Consistent with prediabetes  >or=6.5%  Consistent with diabetes  High levels of fetal hemoglobin interfere with the HbA1C  assay. Heterozygous hemoglobin variants (HbS, HgC, etc)do  not significantly interfere with this assay.   However, presence of multiple variants may affect accuracy.          Active Hospital Problems    Diagnosis  POA    *Polyarticular gout [M10.00]  Yes    Acute idiopathic gout of multiple sites [M10.09]  Yes    Chronic gout of left wrist [M1A.0320]  Yes    Polyarthropathy or polyarthritis of multiple sites [M13.0]  Yes    Acute kidney injury superimposed on chronic kidney disease [N17.9, N18.9]  Yes    Non-traumatic rhabdomyolysis [M62.82]  Yes    Transaminitis [R74.0]  Yes    Urinary retention [R33.9]  Yes    Generalized weakness [R53.1]  Yes    Left carpal tunnel syndrome [G56.02]  Yes    COPD, moderate [J44.9]  Yes    Obstructive sleep  apnea on CPAP: setting = 8 [G47.33, Z99.89]  Not Applicable      Resolved Hospital Problems   No resolved problems to display.          ASSESSMENT AND PLAN:     Polyarticular Gout  - Presented with rapid onset polyarthritis of his UE and LE with associated fever, fatigue and decreased po intake.   - Physical exam with asymmetric swelling on his UE and LE.   - Labs noting leukocytosis 17, Cr 2.2 (baseline 1.5), , CPK 2343, .   - CT A/P unremarkable.   - Rheumatology consulted, s/p bedside arthrocentesis by Rhe positive for urate crystals confirmed diagnosis.   - Fever likely from acute gout flare at this time  - Left UE with no DVT.  - Rheum rec IV steroids (IV solumedrol 80 mg x 3 days) started now changed to PO steroids  - Significant improvement since starting steroids  - Start colchicine now since renal function near baseline   - Avoiding NSAIDS use to KISHA  - Continue fluids (rate decreased) and PO steroids for now  - Agree with d/c-ing thiazide as this can increase uric acid in serum and dehydrate patient  - Follow up synovial fluid cultures  - Once on colchicine and gout flare resolves, will need to be on allopurinol or other newer agents for serum uric acid reduction, this can be done outpatient   - Supportive care    GPC bacteremia - contaminant   - Blood cultures 2/4 from admission day returned positive for staph, pending final results  - Patient was subsequently started on vancomycin and repeat bcx ordered, NGTD  - Ortho consulted for bacteremia with recent wrist surgery s/p wrist aspiration with gout  - Bcx returned as contaminant, dc abx     Acute kidney injury superimposed on chronic kidney disease - resolved  - Creatinine 2.2  (baseline 1.5)   - Suspecting secondary to dehydration and mild rhabdo   - CT abdomen unremarkable  - Improved with fluids, now decreased fluid rate  - monitor strict I/Os  - Rivera placed due to retention, suspecting from pain, plan to remove today with voiding  trials  - daily labs     Non-traumatic rhabdomyolysis  - CPK 2343  - from prolonged immobility, he was stuck in his chair for over 15 hrs due to pain  - CPK improving with fluids  - Continue IVFs, rate decreased   - Daily CPK     Transaminitis  - , ,  ;  TB 2.3  - CT abdomen unremarkable  - acute hepatitis panel neg  - AUS with hepatosteatosis   - Initially suspected from rhabdo  - Now LTFs worsening, no abdominal pain, 2/2 to ?solumedrol which is now discontinued  - cont supportive care  - repeat labs in the AM     Urinary retention  - New urinary incontinence. Patient was unable to void in the ED and on in and out cath had >500cc output. S/p bryant cath  - suspecting secondary to pain, remove bryant today     Generalized weakness  - see above      Left carpal tunnel syndrome  - POD7 sp left carpal tunnel release  - incisions clean dry and intact without evidence of acute infection  - ortho consulted, [place] with initially though bacteremia and recent procedure  - s/p wrist aspiration noting gout\  - treat gout as above     COPD, moderate  - Duonebs prn     Obstructive sleep apnea on CPAP  - setting = 8  - CPAP HS      Prophylaxis- Hep TID  Code Status- Full Code   Discharge plan and follow up - d/c to rehab once medically stable as per PT OT    Guilherme Anderson MD  Hospital Medicine Staff  Pager [phone]

## 2020-02-29 NOTE — CONSULTS
[facility]  Orthopedics  Consult Note    Patient Name: Bj Pate Jr.  MRN: [id number]  Admission Date: 2/26/2020  Hospital Length of Stay: 1 days  Attending Provider: Guilherme Anderson MD  Primary Care Provider: Azikiwe K Lombard, MD    Patient information was obtained from patient and ER records.     Inpatient consult to Orthopedics  Consult performed by: Dariel Scott MD  Consult ordered by: Guilherme Anderson MD        Subjective:     Principal Problem:Polyarthropathy or polyarthritis of multiple sites    Chief Complaint:   Chief Complaint   Patient presents with    Fatigue     seen at several hospitals, x4 days, can not move extremities, left knee pain,         HPI:     CVA, Gout, DDD of Lumbar, Cervical Radiculopathy, and L Carpal tunnel surgery (2/2019 by Dr. Mccurdy) recent Left CTS surgery, [place] [facility] [place] with  generalized weakness which has been progressively worsening over the past week. Labs noting leukocytosis 17, Cr 2.2 (baseline 1.5), , CPK 2343, . Patient admitted for possible sepsis, BCxs NGTD currently. Rheum was consulted and performed bedside arthrocentesis of patient's knee had that resulted with positive urate crystals.    No new subjective & objective note has been filed under this hospital service since the last note was generated.        Physical Exam:  Gen:  No acute distress  CV:  Peripherally well-perfused.  Pulses 2+ bilaterally.  Lungs:  Normal respiratory effort.  Abdomen:  Soft, non-tender, non-distended  Head/Neck:  Normocephalic.  Atraumatic. No TTP, AROM and PROM intact without pain  Neuro:  CN intact without deficit, SILT throughout B/L Upper & Lower Extremities  Pelvis: No TTP, Stable to direct anterior pressure over ASIS.    Left UPPER EXTREMITY:     INSPECTION  - Left wrist with CT incision well healed. Diffuse swelling dorsally  PALPATION  - TTP about wrist  RANGE OF MOTION  - AROM and PROM intact at the wrist joint with minimal  pain  NEUROVASCULAR  - AIN/PIN/Radial/Median/Ulnar Nerves assessed in isolation without deficit  - SILT throughout  - Radial & Ulnar arteries palpated 2+  - Capillary Refill <3s      Assessment/Plan:     Chronic gout of left wrist  Bj Pate Jr. is a 70 y.o. male status post left wrist carpal tunnel release by Dr. shine alex on February 19th.  Now with new onset left wrist pain since surgery. History of chronic gout in multiple joints however he a he has not had this in the left wrist as of yet.    - left wrist aspirated with .5 cc of is gouty tophaceous fluid on aspiration.  Labs positive for intracellular and extracellular gouty crystals consistent with gout. Unable to before cell count as specimen was solid. Intra-articular corticosteroid was then injected into the wrist. To follow-up with rheumatology for chronic gout treatment as he is not adequately treated with allopurinol.  Will follow cultures.  To follow up with Dr. Arora at regularly scheduled visit for carpal tunnel release.    Procedure Note:  After consent was obtained, using sterile technique the left wrist joint was entered and .5 cc of gouty colored fluid was withdrawn and sent for cell count, /cbc, culture and crystals. Sterile dressing was applied. The procedure was well tolerated.               Dariel Scott MD  Orthopedics  [facility]

## 2020-02-29 NOTE — PROGRESS NOTES
[facility] Hospital Medicine                                                                     Progress Note     Team: [facility] Guilherme Anderson MD   Admit Date: 2/26/2020   Hospital Day: 1  GLORIA: 3/1/2020   Code status: Full Code   Principal Problem: Polyarticular gout     SUMMARY:     71yo male with a past medical history of CVA without residual deficit, COPD, HILARIO on CPAP, Gout, DDD of Lumbar, Cervical Radiculopathy, HTN and recent Left CTS surgery presented with rapid onset polyarthritis of his UE and LE with associated fever, fatigue and decreased po intake. Presented to multiple ED's in the week with negative work up, thus sent home. He attributes significant weakness secondary to pain. Pain rendered him immobile for almost 2 day unsupervised at home (he was unable to get out of his chair). Physical exam with asymmetric swelling on his UE and LE. Labs noting leukocytosis 17, Cr 2.2 (baseline 1.5), , CPK 2343, . CT A/P unremarkable. Admitted to hospital medicine. Rheumatology consulted, suspecting polyarticular gout. S/p bedside arthrocentesis by Rhem positive for urate crystals confirmed diagnosis. Fever likely from acute gout flare. Elevated CPK suspected from prolonged immobility. AUS and acute hep negative. Suspecting LFT elevation from rhabdo. Rheum rec IV steroids (IV solumedrol 80 mg x 3 days), and adding colchicine when renal function improved. Left UE with no DVT. Blood cultures 2/4 from admission day returned positive for staph. Patient was subsequently started on vancomycin and repeat bcx ordered. Rheumatology rec switched IV solumedrol to po 40 steroids considering his bacteremia. Ortho consulted for bacteremia with recent wrist surgery. Continue fluids and IV vancomycin for now. Supportive care.      SUBJECTIVE:     Pt was seen and  examined at bedside. Patient spiked a fever yesterday afternoon, he was not on IV abx. Blood cultures overnight 2/4 positive for staph, considering it's one site possible contaminant? Pending repeat blood cultures, but have started on IV vancomycin [place] with his recent surgery. Ortho consulted. This Am he feels much better, significant improvement in pain and symptoms. Worked well with PT OT. Continues to have fatigue. PT OT rec rehab.     ROS (Positive in Bold, otherwise negative)  Pain Scale: 0 /10   Constitutional: fever, generalized weakness, chills, night sweats  CV: chest pain, edema, palpitations  Resp: SOB, cough, sputum production  GI: changes in appetite, NVDC, pain, melena, hematochezia, GERD, hematemesis  : Dysuria, hematuria, urinary urgency, frequency  MSK: arthralgia/myalgia, joint swelling  SKIN: rashes, pruritis, petechiae   Neuro/Psych: FND, anxiety, depression      OBJECTIVE:     Vitals:  Temp:  [97.9 °F (36.6 °C)-99.3 °F (37.4 °C)]   Pulse:  []   Resp:  [17-19]   BP: (129-138)/(59-81)   SpO2:  [92 %-100 %]      I & O (Last 24H):     Intake/Output Summary (Last 24 hours) at 2/28/2020 1924  Last data filed at 2/28/2020 0500  Gross per 24 hour   Intake 1382 ml   Output 1400 ml   Net -18 ml         GEN: AA male  in no acute distress. Nontoxic. Resting in bed. Cooperative. Appears more comfortable today.  HEENT: NCAT. PERRL. EOMI. Conjunctivae/corneas clear, sclera Anicteric.  CVS: RRR. Normal s1 s2 no murmur, click, rub or gallop  LUNG: CTAB. Normal respiratory effort. No wheezes, rhonchi, or crackles.  ABD: Normoactive BS, soft, NT, ND, no masses or organomegaly.  EXT: Joint swelling noted on multiple UE joints. Left arm swollen compared to right. Moves extremities better today  SKIN: color, texture, turgor normal. No rashes or lesions  NEURO: Alert, oriented x 4, grossly normal      All recent labs and imaging has been reviewed.     Recent Results (from the past 24 hour(s))   Comprehensive  Metabolic Panel (CMP)    Collection Time: 02/28/20  6:27 AM   Result Value Ref Range    Sodium 136 136 - 145 mmol/L    Potassium 4.5 3.5 - 5.1 mmol/L    Chloride 104 95 - 110 mmol/L    CO2 21 (L) 23 - 29 mmol/L    Glucose 215 (H) 70 - 110 mg/dL    BUN, Bld 58 (H) 8 - 23 mg/dL    Creatinine 1.8 (H) 0.5 - 1.4 mg/dL    Calcium 9.0 8.7 - 10.5 mg/dL    Total Protein 6.9 6.0 - 8.4 g/dL    Albumin 1.8 (L) 3.5 - 5.2 g/dL    Total Bilirubin 2.1 (H) 0.1 - 1.0 mg/dL    Alkaline Phosphatase 119 55 - 135 U/L     (H) 10 - 40 U/L     (H) 10 - 44 U/L    Anion Gap 11 8 - 16 mmol/L    eGFR if African American 43.7 (A) >60 mL/min/1.73 m^2    eGFR if non  37.8 (A) >60 mL/min/1.73 m^2   Magnesium    Collection Time: 02/28/20  6:27 AM   Result Value Ref Range    Magnesium 2.2 1.6 - 2.6 mg/dL   Phosphorus    Collection Time: 02/28/20  6:27 AM   Result Value Ref Range    Phosphorus 4.0 2.7 - 4.5 mg/dL   CBC with Automated Differential    Collection Time: 02/28/20  6:27 AM   Result Value Ref Range    WBC 16.46 (H) 3.90 - 12.70 K/uL    RBC 3.61 (L) 4.60 - 6.20 M/uL    Hemoglobin 10.0 (L) 14.0 - 18.0 g/dL    Hematocrit 32.9 (L) 40.0 - 54.0 %    Mean Corpuscular Volume 91 82 - 98 fL    Mean Corpuscular Hemoglobin 27.7 27.0 - 31.0 pg    Mean Corpuscular Hemoglobin Conc 30.4 (L) 32.0 - 36.0 g/dL    RDW 14.8 (H) 11.5 - 14.5 %    Platelets 274 150 - 350 K/uL    MPV 10.6 9.2 - 12.9 fL    Immature Granulocytes 1.1 (H) 0.0 - 0.5 %    Gran # (ANC) 15.3 (H) 1.8 - 7.7 K/uL    Immature Grans (Abs) 0.18 (H) 0.00 - 0.04 K/uL    Lymph # 0.4 (L) 1.0 - 4.8 K/uL    Mono # 0.6 0.3 - 1.0 K/uL    Eos # 0.0 0.0 - 0.5 K/uL    Baso # 0.01 0.00 - 0.20 K/uL    nRBC 0 0 /100 WBC    Gran% 92.6 (H) 38.0 - 73.0 %    Lymph% 2.6 (L) 18.0 - 48.0 %    Mono% 3.6 (L) 4.0 - 15.0 %    Eosinophil% 0.0 0.0 - 8.0 %    Basophil% 0.1 0.0 - 1.9 %    Differential Method Automated    CK    Collection Time: 02/28/20  6:27 AM   Result Value Ref Range    CPK  1500 (H) 20 - 200 U/L   Blood culture    Collection Time: 02/28/20  6:27 AM   Result Value Ref Range    Blood Culture, Routine No Growth to date    Blood culture    Collection Time: 02/28/20  6:27 AM   Result Value Ref Range    Blood Culture, Routine No Growth to date    POCT glucose    Collection Time: 02/28/20  8:20 AM   Result Value Ref Range    POCT Glucose 219 (H) 70 - 110 mg/dL   POCT glucose    Collection Time: 02/28/20 12:28 PM   Result Value Ref Range    POCT Glucose 203 (H) 70 - 110 mg/dL   Gram stain    Collection Time: 02/28/20  4:39 PM   Result Value Ref Range    Gram Stain Result No WBC's     Gram Stain Result No organisms seen    Body fluid crystal Joint Fluid, Left Wrist    Collection Time: 02/28/20  4:39 PM   Result Value Ref Range    Body Fluid Source, Crystal Exam Joint Fluid, Left Wrist     Body Fluid Crystal Positive (A) Negative   POCT glucose    Collection Time: 02/28/20  7:06 PM   Result Value Ref Range    POCT Glucose 238 (H) 70 - 110 mg/dL       Recent Labs   Lab 02/28/20  0820 02/28/20  1228 02/28/20  1906   POCTGLUCOSE 219* 203* 238*       Hemoglobin A1C   Date Value Ref Range Status   08/19/2019 5.6 4.0 - 5.6 % Final     Comment:     ADA Screening Guidelines:  5.7-6.4%  Consistent with prediabetes  >or=6.5%  Consistent with diabetes  High levels of fetal hemoglobin interfere with the HbA1C  assay. Heterozygous hemoglobin variants (HbS, HgC, etc)do  not significantly interfere with this assay.   However, presence of multiple variants may affect accuracy.     01/25/2019 5.6 4.0 - 5.6 % Final     Comment:     ADA Screening Guidelines:  5.7-6.4%  Consistent with prediabetes  >or=6.5%  Consistent with diabetes  High levels of fetal hemoglobin interfere with the HbA1C  assay. Heterozygous hemoglobin variants (HbS, HgC, etc)do  not significantly interfere with this assay.   However, presence of multiple variants may affect accuracy.     07/25/2018 5.6 4.0 - 5.6 % Final     Comment:     ADA  Screening Guidelines:  5.7-6.4%  Consistent with prediabetes  >or=6.5%  Consistent with diabetes  High levels of fetal hemoglobin interfere with the HbA1C  assay. Heterozygous hemoglobin variants (HbS, HgC, etc)do  not significantly interfere with this assay.   However, presence of multiple variants may affect accuracy.          Active Hospital Problems    Diagnosis  POA    *Polyarticular gout [M10.00]  Yes    Chronic gout of left wrist [M1A.0320]  Yes    Polyarthropathy or polyarthritis of multiple sites [M13.0]  Yes    Acute kidney injury superimposed on chronic kidney disease [N17.9, N18.9]  Yes    Non-traumatic rhabdomyolysis [M62.82]  Yes    Transaminitis [R74.0]  Yes    Urinary retention [R33.9]  Yes    Generalized weakness [R53.1]  Yes    Left carpal tunnel syndrome [G56.02]  Yes    COPD, moderate [J44.9]  Yes    Obstructive sleep apnea on CPAP: setting = 8 [G47.33, Z99.89]  Not Applicable      Resolved Hospital Problems   No resolved problems to display.          ASSESSMENT AND PLAN:     Polyarticular Gout  - Presented with rapid onset polyarthritis of his UE and LE with associated fever, fatigue and decreased po intake.   - Physical exam with asymmetric swelling on his UE and LE.   - Labs noting leukocytosis 17, Cr 2.2 (baseline 1.5), , CPK 2343, .   - CT A/P unremarkable.   - Rheumatology consulted, s/p bedside arthrocentesis by Rhem positive for urate crystals confirmed diagnosis.   - Fever likely from acute gout flare at this time (bacteremia noted, awaiting final)  - Left UE with no DVT.  - Rheum rec IV steroids (IV solumedrol 80 mg x 3 days) started now changed to PO steroids due to bacteremia noted  - Significant improvement since starting IV steroids  - Plan to add colchicine when renal function improve  - Avoiding NSAIDS use to KISHA  - Continue fluids and PO steroids for now  - Agree with d/c-ing thiazide as this can increase uric acid in serum and dehydrate patient  - Follow  up synovial fluid cultures and blood cultures  - Once on colchicine and gout flare resolves, will need to be on allopurinol or other newer agents for serum uric acid reduction  - Supportive care    GPC bacteremia  - Blood cultures 2/4 from admission day returned positive for staph, pending final results  - Patient was subsequently started on vancomycin and repeat bcx ordered. R  - heumatology rec switched IV solumedrol to po 40 steroids considering his bacteremia.   - Ortho consulted for bacteremia with recent wrist surgery.   - Continue fluids and IV vancomycin for now.   - Follow up synovial fluid cultures and blood cultures     Acute kidney injury superimposed on chronic kidney disease  - Creatinine 2.2  (baseline 1.5)  - Suspecting secondary to dehydration and mild rhabdo   - CT abdomen unremarkable  - bolused 1.5L in ED  - started on NS 150ml/h continuous, continue  - monitor strict I/Os  - Bryant placed due to retention, suspecting from pain, plan to remove tomorrow with voiding trials   - improving with fluids  - daily labs     Non-traumatic rhabdomyolysis  - CPK 2343  - from prolonged immobility, he was stuck in his chair for over 15 hrs due to pain  - CPK improving with fluids  - aggressive IVFs  - Daily CPK     Transaminitis  - , ,  ;  TB 2.3  - CT abdomen unremarkable  - acute hepatitis panel neg  - AUS with hepatosteatosis   - suspected from rhabdo  - cont supportive care     Urinary retention  - New urinary incontinence. Patient was unable to void in the ED and on in and out cath had >500cc output. S/p bryant cath  - suspecting secondary to pain, remove bryant tomorrow      Generalized weakness  - see above      Left carpal tunnel syndrome  - POD7 sp left carpal tunnel release  - incisions clean dry and intact without evidence of acute infection  - ortho consulted, [place] with bacteremia and recent procedure     COPD, moderate  - Duonebs prn     Obstructive sleep apnea on CPAP  - setting  = 8  - CPAP HS      Prophylaxis- Hep TID  Code Status- Full Code   Discharge plan and follow up - d/c to rehab once medically stable as per PT OT    Guilherme Anderson MD  Hospital Medicine Staff  Pager [phone]

## 2020-03-01 LAB
ALBUMIN SERPL BCP-MCNC: 1.7 G/DL (ref 3.5–5.2)
ALP SERPL-CCNC: 119 U/L (ref 55–135)
ALT SERPL W/O P-5'-P-CCNC: 568 U/L (ref 10–44)
ANION GAP SERPL CALC-SCNC: 9 MMOL/L (ref 8–16)
ANISOCYTOSIS BLD QL SMEAR: SLIGHT
AST SERPL-CCNC: 728 U/L (ref 10–40)
BACTERIA BLD CULT: ABNORMAL
BASOPHILS NFR BLD: 0 % (ref 0–1.9)
BILIRUB SERPL-MCNC: 0.7 MG/DL (ref 0.1–1)
BUN SERPL-MCNC: 62 MG/DL (ref 8–23)
CALCIUM SERPL-MCNC: 9.2 MG/DL (ref 8.7–10.5)
CHLORIDE SERPL-SCNC: 106 MMOL/L (ref 95–110)
CK SERPL-CCNC: 312 U/L (ref 20–200)
CO2 SERPL-SCNC: 22 MMOL/L (ref 23–29)
CREAT SERPL-MCNC: 1.4 MG/DL (ref 0.5–1.4)
DIFFERENTIAL METHOD: ABNORMAL
EOSINOPHIL NFR BLD: 0 % (ref 0–8)
ERYTHROCYTE [DISTWIDTH] IN BLOOD BY AUTOMATED COUNT: 14.9 % (ref 11.5–14.5)
EST. GFR  (AFRICAN AMERICAN): 59.3 ML/MIN/1.73 M^2
EST. GFR  (NON AFRICAN AMERICAN): 51.3 ML/MIN/1.73 M^2
GLUCOSE SERPL-MCNC: 177 MG/DL (ref 70–110)
HCT VFR BLD AUTO: 32.9 % (ref 40–54)
HGB BLD-MCNC: 10.3 G/DL (ref 14–18)
IMM GRANULOCYTES # BLD AUTO: ABNORMAL K/UL (ref 0–0.04)
IMM GRANULOCYTES NFR BLD AUTO: ABNORMAL % (ref 0–0.5)
INR PPP: 1.1 (ref 0.8–1.2)
LYMPHOCYTES NFR BLD: 5 % (ref 18–48)
MAGNESIUM SERPL-MCNC: 2.1 MG/DL (ref 1.6–2.6)
MCH RBC QN AUTO: 28.1 PG (ref 27–31)
MCHC RBC AUTO-ENTMCNC: 31.3 G/DL (ref 32–36)
MCV RBC AUTO: 90 FL (ref 82–98)
MONOCYTES NFR BLD: 11 % (ref 4–15)
MYELOCYTES NFR BLD MANUAL: 1 %
NEUTROPHILS NFR BLD: 81 % (ref 38–73)
NEUTS BAND NFR BLD MANUAL: 2 %
NRBC BLD-RTO: 0 /100 WBC
OVALOCYTES BLD QL SMEAR: ABNORMAL
PHOSPHATE SERPL-MCNC: 3 MG/DL (ref 2.7–4.5)
PLATELET # BLD AUTO: 342 K/UL (ref 150–350)
PLATELET BLD QL SMEAR: ABNORMAL
PMV BLD AUTO: 10.7 FL (ref 9.2–12.9)
POCT GLUCOSE: 144 MG/DL (ref 70–110)
POCT GLUCOSE: 180 MG/DL (ref 70–110)
POCT GLUCOSE: 197 MG/DL (ref 70–110)
POCT GLUCOSE: 216 MG/DL (ref 70–110)
POIKILOCYTOSIS BLD QL SMEAR: SLIGHT
POTASSIUM SERPL-SCNC: 4.8 MMOL/L (ref 3.5–5.1)
PROT SERPL-MCNC: 6.5 G/DL (ref 6–8.4)
PROTHROMBIN TIME: 11 SEC (ref 9–12.5)
RBC # BLD AUTO: 3.66 M/UL (ref 4.6–6.2)
SODIUM SERPL-SCNC: 137 MMOL/L (ref 136–145)
WBC # BLD AUTO: 17.77 K/UL (ref 3.9–12.7)

## 2020-03-01 PROCEDURE — 86256 FLUORESCENT ANTIBODY TITER: CPT

## 2020-03-01 PROCEDURE — 63600175 PHARM REV CODE 636 W HCPCS: Performed by: FAMILY MEDICINE

## 2020-03-01 PROCEDURE — 85027 COMPLETE CBC AUTOMATED: CPT

## 2020-03-01 PROCEDURE — 36415 COLL VENOUS BLD VENIPUNCTURE: CPT

## 2020-03-01 PROCEDURE — 85610 PROTHROMBIN TIME: CPT

## 2020-03-01 PROCEDURE — 85007 BL SMEAR W/DIFF WBC COUNT: CPT

## 2020-03-01 PROCEDURE — 82550 ASSAY OF CK (CPK): CPT

## 2020-03-01 PROCEDURE — 99232 SBSQ HOSP IP/OBS MODERATE 35: CPT | Mod: ,,, | Performed by: INTERNAL MEDICINE

## 2020-03-01 PROCEDURE — 99900035 HC TECH TIME PER 15 MIN (STAT)

## 2020-03-01 PROCEDURE — 94761 N-INVAS EAR/PLS OXIMETRY MLT: CPT

## 2020-03-01 PROCEDURE — C9399 UNCLASSIFIED DRUGS OR BIOLOG: HCPCS | Performed by: INTERNAL MEDICINE

## 2020-03-01 PROCEDURE — 25000003 PHARM REV CODE 250: Performed by: FAMILY MEDICINE

## 2020-03-01 PROCEDURE — 80053 COMPREHEN METABOLIC PANEL: CPT

## 2020-03-01 PROCEDURE — 84100 ASSAY OF PHOSPHORUS: CPT

## 2020-03-01 PROCEDURE — 63600175 PHARM REV CODE 636 W HCPCS: Performed by: INTERNAL MEDICINE

## 2020-03-01 PROCEDURE — 94660 CPAP INITIATION&MGMT: CPT

## 2020-03-01 PROCEDURE — 25000003 PHARM REV CODE 250: Performed by: INTERNAL MEDICINE

## 2020-03-01 PROCEDURE — 83735 ASSAY OF MAGNESIUM: CPT

## 2020-03-01 PROCEDURE — 99232 PR SUBSEQUENT HOSPITAL CARE,LEVL II: ICD-10-PCS | Mod: ,,, | Performed by: INTERNAL MEDICINE

## 2020-03-01 PROCEDURE — 86038 ANTINUCLEAR ANTIBODIES: CPT

## 2020-03-01 PROCEDURE — 11000001 HC ACUTE MED/SURG PRIVATE ROOM

## 2020-03-01 RX ORDER — LISINOPRIL 20 MG/1
20 TABLET ORAL DAILY
Status: DISCONTINUED | OUTPATIENT
Start: 2020-03-01 | End: 2020-03-01

## 2020-03-01 RX ORDER — LISINOPRIL 20 MG/1
40 TABLET ORAL DAILY
Status: DISCONTINUED | OUTPATIENT
Start: 2020-03-02 | End: 2020-03-02 | Stop reason: HOSPADM

## 2020-03-01 RX ORDER — TAMSULOSIN HYDROCHLORIDE 0.4 MG/1
0.4 CAPSULE ORAL DAILY
Status: DISCONTINUED | OUTPATIENT
Start: 2020-03-01 | End: 2020-03-02 | Stop reason: HOSPADM

## 2020-03-01 RX ORDER — CHOLECALCIFEROL (VITAMIN D3) 25 MCG
1000 TABLET ORAL DAILY
Status: DISCONTINUED | OUTPATIENT
Start: 2020-03-02 | End: 2020-03-02 | Stop reason: HOSPADM

## 2020-03-01 RX ADMIN — TAMSULOSIN HYDROCHLORIDE 0.4 MG: 0.4 CAPSULE ORAL at 02:03

## 2020-03-01 RX ADMIN — INSULIN DETEMIR 8 UNITS: 100 INJECTION, SOLUTION SUBCUTANEOUS at 08:03

## 2020-03-01 RX ADMIN — GABAPENTIN 600 MG: 300 CAPSULE ORAL at 09:03

## 2020-03-01 RX ADMIN — HEPARIN SODIUM 5000 UNITS: 5000 INJECTION, SOLUTION INTRAVENOUS; SUBCUTANEOUS at 02:03

## 2020-03-01 RX ADMIN — PREDNISONE 40 MG: 10 TABLET ORAL at 09:03

## 2020-03-01 RX ADMIN — HEPARIN SODIUM 5000 UNITS: 5000 INJECTION, SOLUTION INTRAVENOUS; SUBCUTANEOUS at 09:03

## 2020-03-01 RX ADMIN — FLUTICASONE FUROATE AND VILANTEROL TRIFENATATE 1 PUFF: 100; 25 POWDER RESPIRATORY (INHALATION) at 09:03

## 2020-03-01 RX ADMIN — GABAPENTIN 600 MG: 300 CAPSULE ORAL at 04:03

## 2020-03-01 RX ADMIN — INSULIN DETEMIR 8 UNITS: 100 INJECTION, SOLUTION SUBCUTANEOUS at 09:03

## 2020-03-01 RX ADMIN — GABAPENTIN 600 MG: 300 CAPSULE ORAL at 08:03

## 2020-03-01 RX ADMIN — INSULIN ASPART 2 UNITS: 100 INJECTION, SOLUTION INTRAVENOUS; SUBCUTANEOUS at 12:03

## 2020-03-01 RX ADMIN — HEPARIN SODIUM 5000 UNITS: 5000 INJECTION, SOLUTION INTRAVENOUS; SUBCUTANEOUS at 05:03

## 2020-03-01 RX ADMIN — COLCHICINE 0.6 MG: 0.6 TABLET, FILM COATED ORAL at 09:03

## 2020-03-01 RX ADMIN — LISINOPRIL 20 MG: 20 TABLET ORAL at 02:03

## 2020-03-01 RX ADMIN — DILTIAZEM HYDROCHLORIDE 240 MG: 240 CAPSULE, COATED, EXTENDED RELEASE ORAL at 09:03

## 2020-03-01 NOTE — PROGRESS NOTES
[facility] Hospital Medicine                                                                     Progress Note     Team: [facility] HOSP MED G Guilherme Anderson MD   Admit Date: 2/26/2020   Hospital Day: 3  GLORIA: 3/2/2020   Code status: Full Code   Principal Problem: Polyarticular gout     SUMMARY:     71yo male with a past medical history of CVA without residual deficit, COPD, HILARIO on CPAP, Gout, DDD of Lumbar, Cervical Radiculopathy, HTN and recent Left CTS surgery presented with rapid onset polyarthritis of his UE and LE with associated fever, fatigue and decreased po intake. Presented to multiple ED's in the week with negative work up, thus sent home. He attributes significant weakness secondary to pain. Pain rendered him immobile for almost 2 day unsupervised at home (he was unable to get out of his chair). Physical exam with asymmetric swelling on his UE and LE. Labs noting leukocytosis 17, Cr 2.2 (baseline 1.5), , CPK 2343, . CT A/P unremarkable. Admitted to hospital medicine. Rheumatology consulted, suspecting polyarticular gout. S/p bedside arthrocentesis by Rhem positive for urate crystals confirmed diagnosis. Fever likely from acute gout flare. Elevated CPK suspected from prolonged immobility. AUS and acute hep negative. Suspecting LFT elevation from rhabdo. Rheum rec IV steroids (IV solumedrol 80 mg x 3 days), and adding colchicine when renal function improved. Left UE with no DVT. Blood cultures 2/4 from admission day returned positive for staph. Patient was subsequently started on vancomycin and repeat bcx ordered, NGTD. Rheumatology rec switched IV solumedrol to po 40 steroids considering his possible bacteremia. Ortho consulted for bacteremia with recent wrist surgery. Continue fluids and IV vancomycin for now. Supportive care. Ortho aspirated his  left wrist s/p. Labs positive for intracellular and extracellular gouty crystals consistent with gout. Unable to before cell count as specimen was solid. Intra-articular corticosteroid was then injected into the wrist. Ortho rec follow up Dr. Arora OP. BCx returned as contaminant. D/c vancomycin. Started colchicine as per rheumatology as his renal function is now back to baseline. Rivera removed, but failed voiding trail. Discussed with urology and they rec keeping for now as he failed voiding trials, outpatient fu at urology clinic for voiding trials. LFTs worsening discussed with hepatology, and they rec repeat US with doppler, JAGDISH, Anti-smooth Ab, INR. Hepatology consulted placed.        SUBJECTIVE:     Pt was seen and examined at bedside. Rivera removed yesterday. Unfortunately replaced overnight. No notes in chart regarding this... Discussed with patient and stated he was retaining but unknown amount. RN unaware of amount as this was not mentioned in sign out. Discussed with urology and they rec keeping for now as he failed voiding trials, outpatient fu at urology clinic for voiding trials. This AM patient comfortable and non toxic. ROM continues to improve. No PT OT seen patient yesterday or today yet. Cr is now back to baseline, fluids discontinued. LTFs worsening, dicussed with hepatology, and they rec repeat US with doppler, JAGDISH, Anti-smooth Ab, INR. Hepatology consulted placed.       ROS (Positive in Bold, otherwise negative)  Pain Scale: 0 /10   Constitutional: fever (resolved), generalized weakness, chills, night sweats  CV: chest pain, edema, palpitations  Resp: SOB, cough, sputum production  GI: changes in appetite, NVDC, pain, melena, hematochezia, GERD, hematemesis  : Dysuria, hematuria, urinary urgency, frequency  MSK: arthralgia/myalgia, joint swelling  SKIN: rashes, pruritis, petechiae   Neuro/Psych: FND, anxiety, depression      OBJECTIVE:     Vitals:  Temp:  [96.5 °F (35.8 °C)-98.4 °F (36.9 °C)]    Pulse:  [62-68]   Resp:  [18-21]   BP: (142-166)/(70-77)   SpO2:  [92 %-97 %]      I & O (Last 24H):     Intake/Output Summary (Last 24 hours) at 3/1/2020 1511  Last data filed at 3/1/2020 1141  Gross per 24 hour   Intake 480 ml   Output 3151 ml   Net -2671 ml         GEN: AA male  in no acute distress. Nontoxic. Resting in bed. Cooperative. Appears comfortable.  HEENT: NCAT. PERRL. EOMI. Conjunctivae/corneas clear, sclera Anicteric.  CVS: RRR. Normal s1 s2 no murmur, click, rub or gallop  LUNG: CTAB. Normal respiratory effort. Anterior exam with no wheezes, rhonchi, or crackles.  ABD: Normoactive BS, soft, NT, ND, no masses or organomegaly.  EXT: Joint swelling noted on multiple UE joints. Left arm swollen compared to right. Moves extremities better today  SKIN: color, texture, turgor normal. No rashes or lesions  NEURO: Alert, oriented x 4, grossly normal      All recent labs and imaging has been reviewed.     Recent Results (from the past 24 hour(s))   POCT glucose    Collection Time: 02/29/20  5:14 PM   Result Value Ref Range    POCT Glucose 205 (H) 70 - 110 mg/dL   POCT glucose    Collection Time: 02/29/20  9:49 PM   Result Value Ref Range    POCT Glucose 192 (H) 70 - 110 mg/dL   Comprehensive Metabolic Panel (CMP)    Collection Time: 03/01/20  4:16 AM   Result Value Ref Range    Sodium 137 136 - 145 mmol/L    Potassium 4.8 3.5 - 5.1 mmol/L    Chloride 106 95 - 110 mmol/L    CO2 22 (L) 23 - 29 mmol/L    Glucose 177 (H) 70 - 110 mg/dL    BUN, Bld 62 (H) 8 - 23 mg/dL    Creatinine 1.4 0.5 - 1.4 mg/dL    Calcium 9.2 8.7 - 10.5 mg/dL    Total Protein 6.5 6.0 - 8.4 g/dL    Albumin 1.7 (L) 3.5 - 5.2 g/dL    Total Bilirubin 0.7 0.1 - 1.0 mg/dL    Alkaline Phosphatase 119 55 - 135 U/L     (H) 10 - 40 U/L     (H) 10 - 44 U/L    Anion Gap 9 8 - 16 mmol/L    eGFR if African American 59.3 (A) >60 mL/min/1.73 m^2    eGFR if non  51.3 (A) >60 mL/min/1.73 m^2   Magnesium    Collection Time:  03/01/20  4:16 AM   Result Value Ref Range    Magnesium 2.1 1.6 - 2.6 mg/dL   Phosphorus    Collection Time: 03/01/20  4:16 AM   Result Value Ref Range    Phosphorus 3.0 2.7 - 4.5 mg/dL   CBC with Automated Differential    Collection Time: 03/01/20  4:16 AM   Result Value Ref Range    WBC 17.77 (H) 3.90 - 12.70 K/uL    RBC 3.66 (L) 4.60 - 6.20 M/uL    Hemoglobin 10.3 (L) 14.0 - 18.0 g/dL    Hematocrit 32.9 (L) 40.0 - 54.0 %    Mean Corpuscular Volume 90 82 - 98 fL    Mean Corpuscular Hemoglobin 28.1 27.0 - 31.0 pg    Mean Corpuscular Hemoglobin Conc 31.3 (L) 32.0 - 36.0 g/dL    RDW 14.9 (H) 11.5 - 14.5 %    Platelets 342 150 - 350 K/uL    MPV 10.7 9.2 - 12.9 fL    Immature Granulocytes CANCELED 0.0 - 0.5 %    Immature Grans (Abs) CANCELED 0.00 - 0.04 K/uL    nRBC 0 0 /100 WBC    Gran% 81.0 (H) 38.0 - 73.0 %    Lymph% 5.0 (L) 18.0 - 48.0 %    Mono% 11.0 4.0 - 15.0 %    Eosinophil% 0.0 0.0 - 8.0 %    Basophil% 0.0 0.0 - 1.9 %    Bands 2.0 %    Myelocytes 1.0 %    Platelet Estimate Appears normal     Aniso Slight     Poik Slight     Ovalocytes Occasional     Differential Method Manual    POCT glucose    Collection Time: 03/01/20  8:24 AM   Result Value Ref Range    POCT Glucose 180 (H) 70 - 110 mg/dL   POCT glucose    Collection Time: 03/01/20 12:37 PM   Result Value Ref Range    POCT Glucose 216 (H) 70 - 110 mg/dL       Recent Labs   Lab 02/29/20  0723 02/29/20  1246 02/29/20  1714 02/29/20  2149 03/01/20  0824 03/01/20  1237   POCTGLUCOSE 237* 232* 205* 192* 180* 216*       Hemoglobin A1C   Date Value Ref Range Status   08/19/2019 5.6 4.0 - 5.6 % Final     Comment:     ADA Screening Guidelines:  5.7-6.4%  Consistent with prediabetes  >or=6.5%  Consistent with diabetes  High levels of fetal hemoglobin interfere with the HbA1C  assay. Heterozygous hemoglobin variants (HbS, HgC, etc)do  not significantly interfere with this assay.   However, presence of multiple variants may affect accuracy.     01/25/2019 5.6 4.0 - 5.6  % Final     Comment:     ADA Screening Guidelines:  5.7-6.4%  Consistent with prediabetes  >or=6.5%  Consistent with diabetes  High levels of fetal hemoglobin interfere with the HbA1C  assay. Heterozygous hemoglobin variants (HbS, HgC, etc)do  not significantly interfere with this assay.   However, presence of multiple variants may affect accuracy.     07/25/2018 5.6 4.0 - 5.6 % Final     Comment:     ADA Screening Guidelines:  5.7-6.4%  Consistent with prediabetes  >or=6.5%  Consistent with diabetes  High levels of fetal hemoglobin interfere with the HbA1C  assay. Heterozygous hemoglobin variants (HbS, HgC, etc)do  not significantly interfere with this assay.   However, presence of multiple variants may affect accuracy.          Active Hospital Problems    Diagnosis  POA    *Polyarticular gout [M10.00]  Yes    Acute idiopathic gout of multiple sites [M10.09]  Yes    Chronic gout of left wrist [M1A.0320]  Yes    Polyarthropathy or polyarthritis of multiple sites [M13.0]  Yes    Acute kidney injury superimposed on chronic kidney disease [N17.9, N18.9]  Yes    Non-traumatic rhabdomyolysis [M62.82]  Yes    Transaminitis [R74.0]  Yes    Urinary retention [R33.9]  Yes    Generalized weakness [R53.1]  Yes    Left carpal tunnel syndrome [G56.02]  Yes    COPD, moderate [J44.9]  Yes    Obstructive sleep apnea on CPAP: setting = 8 [G47.33, Z99.89]  Not Applicable      Resolved Hospital Problems   No resolved problems to display.          ASSESSMENT AND PLAN:     Polyarticular Gout  Acute febrile episode   - Presented with rapid onset polyarthritis of his UE and LE with associated fever, fatigue and decreased po intake.   - Physical exam with asymmetric swelling on his UE and LE.   - Labs noting leukocytosis 17, Cr 2.2 (baseline 1.5), , CPK 2343, .   - CT A/P unremarkable.   - Rheumatology consulted, s/p bedside arthrocentesis positive for urate crystals confirmed diagnosis.   - Fever likely from acute  gout flare  - Left UE with no DVT.  - Rheum rec IV steroids (IV solumedrol 80 mg x 3 days) started now changed to PO steroids  - Significant improvement since starting steroids  - Start colchicine now since renal function near baseline   - Avoiding NSAIDS use to KISHA  - Initially on fluids now discontinued, encourage increase po intake   - Agree with d/c-ing thiazide as this can increase uric acid in serum and dehydrate patient  - Follow up synovial fluid cultures  - Once on colchicine and gout flare resolves, will need to be on allopurinol or other newer agents for serum uric acid reduction, this can be done outpatient   - Supportive care    GPC bacteremia - contaminant   - Blood cultures 2/4 from admission day returned positive for staph, pending final results  - Patient was subsequently started on vancomycin and repeat bcx ordered, NGTD  - Ortho consulted for bacteremia with recent wrist surgery s/p wrist aspiration with gout  - Bcx returned as contaminant, dc abx     Acute kidney injury superimposed on chronic kidney disease - resolved  - Creatinine 2.2  (baseline 1.5)   - Suspecting secondary to dehydration and mild rhabdo   - CT abdomen unremarkable  - Resolved with fluids, now fluids discontinued, encourage oral fluid intake  - monitor strict I/Os  - Rivera placed due to retention as below  - daily labs    Transaminitis - worsening   - , ,  ;  TB 2.3  - CT abdomen unremarkable  - acute hepatitis panel neg  - AUS with hepatosteatosis   - Initially suspected from rhabdo  - Initially improved now LTFs worsening, no RUQ abdominal pain, 2/2 to ?solumedrol which is now discontinued  - Injury pattern hepatocellular injury more than cholestatic  - viral panel negative  - Discussed with hepatology, and they rec repeat US with doppler, JAGDISH, Anti-smooth Ab, INR. Hepatology consulted placed.   - cont supportive care  - repeat labs in the AM    Urinary Retention  - Patient retaining urine on admit s/p  bryant placement  - start tamsulosin   - Bryant removed 2/29, but failed voiding trail and was replaced by overnight RN. No notes in chart or amount retained charted  - Discussed with urology and they rec keeping for now as he failed voiding trials, outpatient fu at urology clinic for voiding trials.   - Dc patient on bryant, and follow up with urology outpatient     Non-traumatic rhabdomyolysis - improving   - CPK 2343  - from prolonged immobility, he apparently was stuck in his chair for over 15 hrs due to pain  - CPK improving with fluids  - D/c IVFs     Generalized weakness  - see above      Left carpal tunnel syndrome  - recent left carpal tunnel release  - incisions clean dry and intact without evidence of acute infection  - ortho consulted, [place] with initially though bacteremia and recent procedure  - s/p wrist aspiration noting gout  - treat gout as above     COPD, moderate  - Duonebs prn     Obstructive sleep apnea on CPAP  - setting = 8  - CPAP HS        Prophylaxis- Hep TID  Code Status- Full Code   Discharge plan and follow up - d/c to rehab once medically stable as per PT OT    Guilherme Anderson MD  Hospital Medicine Staff  Pager [phone]

## 2020-03-02 VITALS
HEART RATE: 63 BPM | OXYGEN SATURATION: 96 % | DIASTOLIC BLOOD PRESSURE: 80 MMHG | WEIGHT: 315 LBS | TEMPERATURE: 97 F | SYSTOLIC BLOOD PRESSURE: 149 MMHG | RESPIRATION RATE: 18 BRPM | BODY MASS INDEX: 46.65 KG/M2 | HEIGHT: 69 IN

## 2020-03-02 LAB
ALBUMIN SERPL BCP-MCNC: 1.8 G/DL (ref 3.5–5.2)
ALP SERPL-CCNC: 103 U/L (ref 55–135)
ALT SERPL W/O P-5'-P-CCNC: 443 U/L (ref 10–44)
ANION GAP SERPL CALC-SCNC: 9 MMOL/L (ref 8–16)
ANISOCYTOSIS BLD QL SMEAR: SLIGHT
AST SERPL-CCNC: 258 U/L (ref 10–40)
BACTERIA SPEC AEROBE CULT: NO GROWTH
BACTERIA SPEC AEROBE CULT: NO GROWTH
BASOPHILS # BLD AUTO: ABNORMAL K/UL (ref 0–0.2)
BASOPHILS NFR BLD: 0 % (ref 0–1.9)
BILIRUB SERPL-MCNC: 0.6 MG/DL (ref 0.1–1)
BUN SERPL-MCNC: 56 MG/DL (ref 8–23)
CALCIUM SERPL-MCNC: 9 MG/DL (ref 8.7–10.5)
CHLORIDE SERPL-SCNC: 104 MMOL/L (ref 95–110)
CO2 SERPL-SCNC: 22 MMOL/L (ref 23–29)
CREAT SERPL-MCNC: 1.2 MG/DL (ref 0.5–1.4)
DIFFERENTIAL METHOD: ABNORMAL
EOSINOPHIL # BLD AUTO: ABNORMAL K/UL (ref 0–0.5)
EOSINOPHIL NFR BLD: 0 % (ref 0–8)
ERYTHROCYTE [DISTWIDTH] IN BLOOD BY AUTOMATED COUNT: 14.6 % (ref 11.5–14.5)
EST. GFR  (AFRICAN AMERICAN): >60 ML/MIN/1.73 M^2
EST. GFR  (NON AFRICAN AMERICAN): >60 ML/MIN/1.73 M^2
GLUCOSE SERPL-MCNC: 178 MG/DL (ref 70–110)
HCT VFR BLD AUTO: 31.4 % (ref 40–54)
HGB BLD-MCNC: 9.9 G/DL (ref 14–18)
HYPOCHROMIA BLD QL SMEAR: ABNORMAL
IMM GRANULOCYTES # BLD AUTO: ABNORMAL K/UL (ref 0–0.04)
IMM GRANULOCYTES NFR BLD AUTO: ABNORMAL % (ref 0–0.5)
LYMPHOCYTES # BLD AUTO: ABNORMAL K/UL (ref 1–4.8)
LYMPHOCYTES NFR BLD: 11 % (ref 18–48)
MAGNESIUM SERPL-MCNC: 1.9 MG/DL (ref 1.6–2.6)
MCH RBC QN AUTO: 27.9 PG (ref 27–31)
MCHC RBC AUTO-ENTMCNC: 31.5 G/DL (ref 32–36)
MCV RBC AUTO: 89 FL (ref 82–98)
MONOCYTES # BLD AUTO: ABNORMAL K/UL (ref 0.3–1)
MONOCYTES NFR BLD: 7 % (ref 4–15)
MYELOCYTES NFR BLD MANUAL: 1 %
NEUTROPHILS NFR BLD: 81 % (ref 38–73)
NRBC BLD-RTO: 0 /100 WBC
OVALOCYTES BLD QL SMEAR: ABNORMAL
PHOSPHATE SERPL-MCNC: 2.9 MG/DL (ref 2.7–4.5)
PLATELET # BLD AUTO: 341 K/UL (ref 150–350)
PLATELET BLD QL SMEAR: ABNORMAL
PMV BLD AUTO: 11.2 FL (ref 9.2–12.9)
POCT GLUCOSE: 162 MG/DL (ref 70–110)
POIKILOCYTOSIS BLD QL SMEAR: SLIGHT
POLYCHROMASIA BLD QL SMEAR: ABNORMAL
POTASSIUM SERPL-SCNC: 4.8 MMOL/L (ref 3.5–5.1)
PROT SERPL-MCNC: 6.4 G/DL (ref 6–8.4)
RBC # BLD AUTO: 3.55 M/UL (ref 4.6–6.2)
SODIUM SERPL-SCNC: 135 MMOL/L (ref 136–145)
TARGETS BLD QL SMEAR: ABNORMAL
WBC # BLD AUTO: 14.01 K/UL (ref 3.9–12.7)

## 2020-03-02 PROCEDURE — 99239 HOSP IP/OBS DSCHRG MGMT >30: CPT | Mod: ,,, | Performed by: INTERNAL MEDICINE

## 2020-03-02 PROCEDURE — 80053 COMPREHEN METABOLIC PANEL: CPT

## 2020-03-02 PROCEDURE — 99233 SBSQ HOSP IP/OBS HIGH 50: CPT | Mod: GC,,, | Performed by: INTERNAL MEDICINE

## 2020-03-02 PROCEDURE — 97110 THERAPEUTIC EXERCISES: CPT

## 2020-03-02 PROCEDURE — 36415 COLL VENOUS BLD VENIPUNCTURE: CPT

## 2020-03-02 PROCEDURE — 25000003 PHARM REV CODE 250: Performed by: FAMILY MEDICINE

## 2020-03-02 PROCEDURE — 99233 PR SUBSEQUENT HOSPITAL CARE,LEVL III: ICD-10-PCS | Mod: GC,,, | Performed by: INTERNAL MEDICINE

## 2020-03-02 PROCEDURE — 97112 NEUROMUSCULAR REEDUCATION: CPT

## 2020-03-02 PROCEDURE — 99222 1ST HOSP IP/OBS MODERATE 55: CPT | Mod: ,,, | Performed by: NURSE PRACTITIONER

## 2020-03-02 PROCEDURE — 94761 N-INVAS EAR/PLS OXIMETRY MLT: CPT

## 2020-03-02 PROCEDURE — 85007 BL SMEAR W/DIFF WBC COUNT: CPT

## 2020-03-02 PROCEDURE — 99222 PR INITIAL HOSPITAL CARE,LEVL II: ICD-10-PCS | Mod: ,,, | Performed by: NURSE PRACTITIONER

## 2020-03-02 PROCEDURE — 97530 THERAPEUTIC ACTIVITIES: CPT

## 2020-03-02 PROCEDURE — 25000003 PHARM REV CODE 250: Performed by: INTERNAL MEDICINE

## 2020-03-02 PROCEDURE — 83735 ASSAY OF MAGNESIUM: CPT

## 2020-03-02 PROCEDURE — 63600175 PHARM REV CODE 636 W HCPCS: Performed by: FAMILY MEDICINE

## 2020-03-02 PROCEDURE — 85027 COMPLETE CBC AUTOMATED: CPT

## 2020-03-02 PROCEDURE — 99900035 HC TECH TIME PER 15 MIN (STAT)

## 2020-03-02 PROCEDURE — 99239 PR HOSPITAL DISCHARGE DAY,>30 MIN: ICD-10-PCS | Mod: ,,, | Performed by: INTERNAL MEDICINE

## 2020-03-02 PROCEDURE — 84100 ASSAY OF PHOSPHORUS: CPT

## 2020-03-02 PROCEDURE — 63600175 PHARM REV CODE 636 W HCPCS: Performed by: INTERNAL MEDICINE

## 2020-03-02 RX ORDER — COLCHICINE 0.6 MG/1
0.6 TABLET, FILM COATED ORAL DAILY
Qty: 30 TABLET | Refills: 11 | Status: SHIPPED | OUTPATIENT
Start: 2020-03-02 | End: 2020-03-02 | Stop reason: SDUPTHER

## 2020-03-02 RX ORDER — TAMSULOSIN HYDROCHLORIDE 0.4 MG/1
0.4 CAPSULE ORAL DAILY
Qty: 30 CAPSULE | Refills: 11 | Status: SHIPPED | OUTPATIENT
Start: 2020-03-03 | End: 2020-04-21 | Stop reason: SDUPTHER

## 2020-03-02 RX ORDER — OXYCODONE HYDROCHLORIDE 10 MG/1
10 TABLET ORAL EVERY 6 HOURS PRN
Refills: 0
Start: 2020-03-02 | End: 2020-07-30

## 2020-03-02 RX ORDER — LISINOPRIL 40 MG/1
40 TABLET ORAL DAILY
Qty: 90 TABLET | Refills: 3 | Status: ON HOLD | OUTPATIENT
Start: 2020-03-03 | End: 2020-03-10 | Stop reason: SDUPTHER

## 2020-03-02 RX ORDER — PREDNISONE 10 MG/1
TABLET ORAL
Qty: 100 TABLET | Refills: 0 | Status: ON HOLD | OUTPATIENT
Start: 2020-03-02 | End: 2020-03-10 | Stop reason: SDUPTHER

## 2020-03-02 RX ORDER — COLCHICINE 0.6 MG/1
0.6 TABLET, FILM COATED ORAL DAILY
Qty: 30 TABLET | Refills: 11
Start: 2020-03-02 | End: 2021-05-19 | Stop reason: SDUPTHER

## 2020-03-02 RX ORDER — ATORVASTATIN CALCIUM 40 MG/1
40 TABLET, FILM COATED ORAL DAILY
Qty: 90 TABLET | Refills: 1 | Status: SHIPPED | OUTPATIENT
Start: 2020-03-07 | End: 2020-04-21 | Stop reason: SDUPTHER

## 2020-03-02 RX ADMIN — HEPARIN SODIUM 5000 UNITS: 5000 INJECTION, SOLUTION INTRAVENOUS; SUBCUTANEOUS at 05:03

## 2020-03-02 RX ADMIN — FLUTICASONE FUROATE AND VILANTEROL TRIFENATATE 1 PUFF: 100; 25 POWDER RESPIRATORY (INHALATION) at 10:03

## 2020-03-02 RX ADMIN — VITAMIN D, TAB 1000IU (100/BT) 1000 UNITS: 25 TAB at 10:03

## 2020-03-02 RX ADMIN — GABAPENTIN 600 MG: 300 CAPSULE ORAL at 10:03

## 2020-03-02 RX ADMIN — HEPARIN SODIUM 5000 UNITS: 5000 INJECTION, SOLUTION INTRAVENOUS; SUBCUTANEOUS at 02:03

## 2020-03-02 RX ADMIN — COLCHICINE 0.6 MG: 0.6 TABLET, FILM COATED ORAL at 10:03

## 2020-03-02 RX ADMIN — GABAPENTIN 600 MG: 300 CAPSULE ORAL at 02:03

## 2020-03-02 RX ADMIN — TAMSULOSIN HYDROCHLORIDE 0.4 MG: 0.4 CAPSULE ORAL at 10:03

## 2020-03-02 RX ADMIN — PREDNISONE 40 MG: 10 TABLET ORAL at 10:03

## 2020-03-02 RX ADMIN — LISINOPRIL 40 MG: 20 TABLET ORAL at 10:03

## 2020-03-02 RX ADMIN — DILTIAZEM HYDROCHLORIDE 240 MG: 240 CAPSULE, COATED, EXTENDED RELEASE ORAL at 10:03

## 2020-03-02 NOTE — CONSULTS
[facility]  Physical Medicine & Rehab  Consult Note    Patient Name: Bj Pate Jr.  MRN: [id number]  Admission Date: 2/26/2020  Hospital Length of Stay: 4 days  Attending Physician: Guilherme Anderson MD     Inpatient consult to Physical Medicine & Rehabilitation  Consult performed by: Chrissy Constantino NP  Consult requested by:  Guilherme Anderson MD    Reason for Consult:  assess rehabilitation needs  Consults  Subjective:     Principal Problem: Polyarticular gout    HPI: Bj Pate is a 71-year-old male with PMHx of CVA without residual deficit, COPD, HILARIO on CPAP, gout, DDD of Lumbar, cervical Radiculopathy, left hand carpal release. Patient [place] [facility] [place] with worsening weakness and joint pain to all extremities.  Rheum was consulted for polyarticular gout.  S/p bedside arthrocentesis by Rhem positive for urate crystals confirmed diagnosis. IV steroids changed to PO steroids.  Hospital course further complicated by retention, tranaminitis, non-traumatic rhabdomyolysis, & GPC bacteremia contaminant .     Functional History: Patient lives alone in a single story home with no steps to enter.  Prior to admission, (I) with ADLs and mobility. DME: none.     Hospital Course: 02/28/2020: Bed mobility MaxA -TA x 2 ppl.  Sit to stand TA x 2 ppl. No gait 2/2 pain.  UBD ModA and LBD TA. Feeding/Grooming Barbara.    Past Medical History:   Diagnosis Date    Anticoagulant long-term use     Basal ganglia hemorrhage     Left basal ganglia hemorrhage with resultant right-sided hemiparesis which has resolved.    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      Past Surgical History:   Procedure Laterality Date    CARPAL TUNNEL RELEASE Left 2/19/2020    Procedure: RELEASE, CARPAL TUNNEL LEFT;  Surgeon: Maria Luisa Mccurdy MD;  Location: [facility] [place], operated by [facility] OR;  Service: Orthopedics;  Laterality: Left;    EPIDURAL STEROID INJECTION N/A 5/2/2019    Procedure: Injection, Steroid, Epidural Cervical;  Surgeon: Dariel Doan Jr., MD;  Location: [facility] ENDO;  Service: Pain Management;  Laterality: N/A;  Cervical Epidural Steroid Injection     44225    Arrive @ 1130; ASA; Check BG    EPIDURAL STEROID INJECTION Bilateral 5/29/2019    Procedure: Lumbar Medial Branch Blocks;  Surgeon: Dariel Doan Jr., MD;  Location: [facility] ENDO;  Service: Pain Management;  Laterality: Bilateral;  Bilateral Lumbar Medial Branch Blocks L3, L4, L5    [id number]  [id number]    Attive @ 1030 (11 arrival request); NO Sedation; ASA; Check BG    EPIDURAL STEROID INJECTION Bilateral 7/3/2019    Procedure: Lumbar Medial Branch Blocks;  Surgeon: Dariel Doan Jr., MD;  Location: [facility] ENDO;  Service: Pain Management;  Laterality: Bilateral;  Bilateral Lumbar Medial Branch Blocks L3, L4, L5    [id number]  45076    Arrive @ 0930; NO Sedation; ASA; Check BG    EPIDURAL STEROID INJECTION N/A 8/7/2019    Procedure: Injection, Steroid, Epidural Cervical;  Surgeon: Dariel Doan Jr., MD;  Location: [facility] ENDO;  Service: Pain Management;  Laterality: N/A;  Cervical Epidural Steroid Injection C7-T1    44883    Arrive @ 1115; Last ASA 7/30; Check BG    NO PAST SURGERIES       Review of patient's allergies indicates:   Allergen Reactions    Tomato (solanum lycopersicum) Hives    Naproxen Hives    Shrimp Other (See Comments)       Scheduled Medications:    colchicine  0.6 mg Oral Daily    diltiaZEM  240 mg Oral Daily    fluticasone furoate-vilanterol  1 puff Inhalation Daily     gabapentin  600 mg Oral TID    heparin (porcine)  5,000 Units Subcutaneous Q8H    insulin detemir U-100  10 Units Subcutaneous BID    lisinopril  40 mg Oral Daily    predniSONE  40 mg Oral Daily    senna-docusate 8.6-50 mg  1 tablet Oral BID    tamsulosin  0.4 mg Oral Daily    vitamin D  1,000 Units Oral Daily       PRN Medications: acetaminophen, albuterol-ipratropium, Dextrose 10% Bolus, Dextrose 10% Bolus, glucagon (human recombinant), glucose, glucose, hydrALAZINE, insulin aspart U-100, ondansetron, oxyCODONE, oxyCODONE, sodium chloride 0.9%    Family History     Problem Relation (Age of Onset)    Diabetes Paternal Grandfather    Heart disease Paternal Grandfather    Hypertension     No Known Problems Mother, Father, Sister, Brother, Maternal Aunt, Maternal Uncle, Paternal Aunt, Paternal Uncle, Maternal Grandmother, Maternal Grandfather, Paternal Grandmother        Tobacco Use    Smoking status: Never Smoker    Smokeless tobacco: Never Used   Substance and Sexual Activity    Alcohol use: Yes     Alcohol/week: 0.0 standard drinks     Comment: occacionally    Drug use: No    Sexual activity: Not Currently     Review of Systems   Constitutional: Positive for activity change. Negative for fatigue and fever.   HENT: Negative for trouble swallowing and voice change.    Eyes: Negative for photophobia and visual disturbance.   Respiratory: Negative for cough and shortness of breath.    Cardiovascular: Negative for chest pain and palpitations.   Gastrointestinal: Negative for nausea and vomiting.   Genitourinary: Negative for difficulty urinating and flank pain.   Musculoskeletal: Positive for arthralgias, gait problem, joint swelling and myalgias.   Skin: Negative for color change and rash.   Neurological: Positive for weakness. Negative for facial asymmetry, speech difficulty and numbness.   Psychiatric/Behavioral: Negative for agitation and confusion.     Objective:     Vital Signs (Most Recent):  Temp:  96.8 °F (36 °C) (03/02/20 0857)  Pulse: 62 (03/02/20 0857)  Resp: 16 (03/02/20 0857)  BP: 133/76 (03/02/20 0857)  SpO2: 97 % (03/02/20 0857)    Vital Signs (24h Range):  Temp:  [96.7 °F (35.9 °C)-97.9 °F (36.6 °C)] 96.8 °F (36 °C)  Pulse:  [53-64] 62  Resp:  [16-20] 16  SpO2:  [91 %-98 %] 97 %  BP: (133-162)/(66-77) 133/76     Body mass index is 49.81 kg/m².    Physical Exam   Constitutional: He is oriented to person, place, and time. He appears well-developed.  Obese.  HENT:   Head: Normocephalic and atraumatic.   Eyes: Right eye exhibits no discharge. Left eye exhibits no discharge.   Neck: Neck supple.   Cardiovascular: Intact distal pulses.   Pulmonary/Chest: Effort normal. No respiratory distress.   Abdominal: Soft. He exhibits no distension.   Musculoskeletal: He exhibits no edema or deformity.   Decreased ROM to BUE/BLE  Generalized weakness   Decreased ROM R wrist >L wrist 2/2 surgery   Neurological: He is alert and oriented to person, place, and time.   Follows commands    Skin: Skin is warm and dry.   Psychiatric: He has a normal mood and affect. His behavior is normal.     Diagnostic Results:   Labs: Reviewed  ECG: Reviewed  X-Ray: Reviewed  US: Reviewed  CT: Reviewed    Assessment/Plan:     * Polyarticular gout  -Rheumatology consulted  - s/p bedside arthrocentesis positive for urate crystals  -on PO steroids and has improved   -started on colchicine    Urinary retention  -bryant d/c'd and f/u w/ uro outpatient    Transaminitis  - repeat US with doppler pending results   - Hepatology consulted placed    Left carpal tunnel syndrome  -recent surgery 02/2020    Decreased functional mobility and endurance  See hospital course for functional status.    Recommendations  -  Encourage mobility, OOB in chair, and early ambulation as appropriate  -  PT/OT evaluate and treat  -  Pain management  -  DVT prophylaxis if appropriate   -  Monitor for and prevent skin breakdown and pressure ulcers  · Early mobility,  repositioning/weight shifting every 20-30 minutes when sitting, turn patient every 2 hours, proper mattress/overlay and chair cushioning, pressure relief/heel protector boots    Participating with therapy. Will follow progress and discuss with PM&R staff for post acute care recommendation.        Thank you for your consult.     Chrissy Constantino NP  Department of Physical Medicine & Rehab  [facility]

## 2020-03-02 NOTE — HOSPITAL COURSE
02/28/2020: Bed mobility MaxA -TA x 2 ppl.  Sit to stand TA x 2 ppl. No gait 2/2 pain.  UBD ModA and LBD TA. Feeding/Grooming Barbara.

## 2020-03-02 NOTE — PT/OT/SLP PROGRESS
Occupational Therapy   Treatment    Name: Bj Pate Jr.  MRN: [id number]  Admitting Diagnosis:  Polyarticular gout       Recommendations:     Discharge Recommendations: rehabilitation facility  Discharge Equipment Recommendations:  (TBD)  Barriers to discharge:  Decreased caregiver support(increased assistance needed)    Assessment:     Bj Pate Jr. is a 70 y.o. male with a medical diagnosis of Polyarticular gout.  Performance deficits affecting function are weakness, impaired endurance, impaired self care skills, impaired sensation, impaired functional mobilty, gait instability, impaired balance, decreased coordination, decreased upper extremity function, decreased lower extremity function, pain, decreased ROM, edema, impaired fine motor, impaired coordination. Patient is extremely limited with ADL tasks due to impaired wrist and finger flexion in B hands. Patient would benefit from continued skilled acute OT 3x/wk to improve functional mobility, increase independence with ADLs, and address established goals. Recommending inpatient rehabilitation once medically appropriate for discharge to increase maximal independence, reduce burden of care, and ensure safety.     Rehab Prognosis:  Fair; patient would benefit from acute skilled OT services to address these deficits and reach maximum level of function.       Plan:     Patient to be seen 3 x/week to address the above listed problems via self-care/home management, therapeutic activities, therapeutic exercises  · Plan of Care Expires: 03/28/20  · Plan of Care Reviewed with: patient    Subjective     Pain/Comfort:  · Pain Rating 1: (Patient did not rate)  · Location - Side 1: Bilateral  · Location - Orientation 1: generalized  · Location 1: (joints at wrists and digits; shoulder; knee)  · Pain Addressed 1: Reposition, Distraction, Pre-medicate for activity  · Pain Rating Post-Intervention 1: (patient did not rate)    Objective:     Communicated with: FERNANDO prior to  session.  Patient found HOB elevated with bed alarm, peripheral IV, telemetry upon OT entry to room.    General Precautions: Standard, fall   Orthopedic Precautions:(unable to lift no more than 5 pounds due to recent carpal tunnel release)   Braces: N/A     Occupational Performance:     Bed Mobility:    · Patient completed Rolling/Turning to Left with  maximal assistance  · Patient completed Scooting/Bridging with maximal assistance to EOB sitting EOB; max A to HOB sitting EOB  · Patient completed Supine to Sit with maximal assistance and 2 persons  · Patient completed Sit to Supine with maximal assistance and 2 persons    Activities of Daily Living:  · Grooming: total assistance    · Lower Body Dressing: total assistance    · Toileting: total assistance        AMPAC 6 Click ADL: 7    Treatment & Education:  Role of OT and POC  Safety  Functional mobility training  Importance of EOB activity    R and L UE AAROM (chest press, bicep curls, and front rows) focusing to improve strength and endurance to increase independence with ADLs. 2 x 10     Patient left HOB elevated with call button in reach, nurse notified and all needs met. Education:      GOALS:   Multidisciplinary Problems     Occupational Therapy Goals        Problem: Occupational Therapy Goal    Goal Priority Disciplines Outcome Interventions   Occupational Therapy Goal     OT, PT/OT Ongoing, Progressing    Description:  Goals to be met by: 3/6/2020     Patient will increase functional independence with ADLs by performing:    UE Dressing with Contact Guard Assistance.  LE Dressing with Contact Guard Assistance.  Grooming while EOB with Supervision.  Toileting from bedside commode with Minimal Assistance for hygiene and clothing management.   Pt will perform B bed mobility tasks with MIN A.   Pt will perform functional mobility task of household and community distance with MIN A using AD as needed.  Pt will increase BUE ROM to functional limits to increase  functional independence                      Time Tracking:     OT Date of Treatment: 03/02/20  OT Start Time: 0916  OT Stop Time: 0946  OT Total Time (min): 30 min    Billable Minutes:Therapeutic Activity 15  Therapeutic Exercise 15    DENICE Hunt  3/2/2020

## 2020-03-02 NOTE — PLAN OF CARE
Problem: Occupational Therapy Goal  Goal: Occupational Therapy Goal  Description  Goals to be met by: 3/6/2020     Patient will increase functional independence with ADLs by performing:    UE Dressing with Contact Guard Assistance.  LE Dressing with Contact Guard Assistance.  Grooming while EOB with Supervision.  Toileting from bedside commode with Minimal Assistance for hygiene and clothing management.   Pt will perform B bed mobility tasks with MIN A.   Pt will perform functional mobility task of household and community distance with MIN A using AD as needed.  Pt will increase BUE ROM to functional limits to increase functional independence     Outcome: Ongoing, Progressing   Patient's goals are appropriate.  DENICE Hunt  3/2/2020

## 2020-03-02 NOTE — PLAN OF CARE
03/02/20 0912   Post-Acute Status   Post-Acute Authorization Placement   Post-Acute Placement Status Referrals Sent     Melissa liaison Soheila Sunshine informed Sw that rehab has submitted to Pt's insurance for approval, Sw to follow.

## 2020-03-02 NOTE — ASSESSMENT & PLAN NOTE
Mr. rodriguez is a 69 yo male with history of gout(not on allopurinol) which usually flares 1x/month in one joint, often his great toe, who presents to [facility] 2/26 with chief complaint of multiple arthralgias, weakness involving upper and lower extremities, fatigue, and decreased PO intake. His ESR and high-sensitivity CRP are significantly elevated at >120 and 416 respectively. He also has elevated uric acid. He has multiple joints--primarily his hands, feet, knees, and ankles which are warm, swollen, and exquisitely tender. He is s/p arthrocentesis of his right knee which revealed monosodium urate crystals(pictured above) consistent with polyarticular gout. He has been developing fevers since admission which can occur in the setting of gout. It is possible his symptoms of weakness could be related to pain. It is unclear why his CK levels are elevated but low suspicion for inflammatory myopathy given the acuity of his presentation. His CPKs have downtrend with fluids     -Synovial fluid of right knee with 98879 WBCs, 99% segs. Cultures without growth thus far  -s/p left wrist aspiration by ortho: chalk white material + urate crystals s/p steroid injection into joint   -s/p 2 doses of IV solumedrol 80 mg patient has significant improvement in his pain and ability to move his extremities    Recommendations:  -Continue prednisone 40mg daily (started 2/29), would continue for another week, then decrease by 10mg per week   -Continue colchicine 0.6 mg daily till follow up   -Follow up synovial fluid cultures  -Once this acute attack resolves, patient would ideally be started on allopurinol as outpatient, HLA  pending   -Avoid NSAIDs in the setting of CKD  -Patient on thiazide diuretic at home. Would consider stopping this to decrease frequency of gout flares in the future.

## 2020-03-02 NOTE — HPI
Bj Pate is a 71-year-old male with PMHx of CVA without residual deficit, COPD, HILARIO on CPAP, gout, DDD of Lumbar, cervical Radiculopathy, left hand carpal release. Patient presented to [facility] on 2/26 with worsening weakness and joint pain to all extremities.  Rheum was consulted for polyarticular gout. S/p bedside arthrocentesis by Rhem positive for urate crystals confirmed diagnosis. IV steroids changed to PO steroids.  Hospital course further complicated by retention, tranaminitis, non-traumatic rhabdomyolysis, & GPC bacteremia contaminant.     Functional History: Patient lives alone in a single story home with no steps to enter.  Prior to admission, (I) with ADLs and mobility. DME: none.

## 2020-03-02 NOTE — SUBJECTIVE & OBJECTIVE
Past Medical History:   Diagnosis Date    Anticoagulant long-term use     Basal ganglia hemorrhage     Left basal ganglia hemorrhage with resultant right-sided hemiparesis which has resolved.     Benign hypertension with CKD (chronic kidney disease) stage III      Cataract     Chronic idiopathic gout of multiple sites     Chronic kidney disease, stage 3     COPD (chronic obstructive pulmonary disease)     Erectile dysfunction     Gout     Hemorrhoids without complication     Hyperlipidemia     Morbid obesity     Obstructive sleep apnea on CPAP     Stroke 2016, 2006    Thalamic infarct, acute (right) 1/2016    Type 2 DM with CKD stage 3 and hypertension     On pravastatin for cardiovascular protection.      Past Surgical History:   Procedure Laterality Date    CARPAL TUNNEL RELEASE Left 2/19/2020    Procedure: RELEASE, CARPAL TUNNEL LEFT;  Surgeon: Maria Luisa Mccurdy MD;  Location: [facility] OR;  Service: Orthopedics;  Laterality: Left;    EPIDURAL STEROID INJECTION N/A 5/2/2019    Procedure: Injection, Steroid, Epidural Cervical;  Surgeon: Dariel Doan Jr., MD;  Location: [facility] ENDO;  Service: Pain Management;  Laterality: N/A;  Cervical Epidural Steroid Injection     [id number]    Arrive @ 1130; ASA; Check BG    EPIDURAL STEROID INJECTION Bilateral 5/29/2019    Procedure: Lumbar Medial Branch Blocks;  Surgeon: Dariel Doan Jr., MD;  Location: [facility] ENDO;  Service: Pain Management;  Laterality: Bilateral;  Bilateral Lumbar Medial Branch Blocks L3, L4, L5    [id number]  [id number]    Attive @ 1030 (11 arrival request); NO Sedation; ASA; Check BG    EPIDURAL STEROID INJECTION Bilateral 7/3/2019    Procedure: Lumbar Medial Branch Blocks;  Surgeon: Dariel Doan Jr., MD;  Location: [facility] ENDO;  Service: Pain Management;  Laterality: Bilateral;  Bilateral Lumbar Medial Branch Blocks L3, L4, L5    [id number]  [id number]    Arrive @ 0930; NO Sedation; ASA; Check BG    EPIDURAL STEROID INJECTION N/A 8/7/2019     Procedure: Injection, Steroid, Epidural Cervical;  Surgeon: Dariel Doan Jr., MD;  Location: [facility];  Service: Pain Management;  Laterality: N/A;  Cervical Epidural Steroid Injection C7-T1    [id number]    Arrive @ 1115; Last ASA 7/30; Check BG    NO PAST SURGERIES       Review of patient's allergies indicates:   Allergen Reactions    Tomato (solanum lycopersicum) Hives    Naproxen Hives    Shrimp Other (See Comments)       Scheduled Medications:    colchicine  0.6 mg Oral Daily    diltiaZEM  240 mg Oral Daily    fluticasone furoate-vilanterol  1 puff Inhalation Daily    gabapentin  600 mg Oral TID    heparin (porcine)  5,000 Units Subcutaneous Q8H    insulin detemir U-100  10 Units Subcutaneous BID    lisinopril  40 mg Oral Daily    predniSONE  40 mg Oral Daily    senna-docusate 8.6-50 mg  1 tablet Oral BID    tamsulosin  0.4 mg Oral Daily    vitamin D  1,000 Units Oral Daily       PRN Medications: acetaminophen, albuterol-ipratropium, Dextrose 10% Bolus, Dextrose 10% Bolus, glucagon (human recombinant), glucose, glucose, hydrALAZINE, insulin aspart U-100, ondansetron, oxyCODONE, oxyCODONE, sodium chloride 0.9%    Family History     Problem Relation (Age of Onset)    Diabetes Paternal Grandfather    Heart disease Paternal Grandfather    Hypertension     No Known Problems Mother, Father, Sister, Brother, Maternal Aunt, Maternal Uncle, Paternal Aunt, Paternal Uncle, Maternal Grandmother, Maternal Grandfather, Paternal Grandmother        Tobacco Use    Smoking status: Never Smoker    Smokeless tobacco: Never Used   Substance and Sexual Activity    Alcohol use: Yes     Alcohol/week: 0.0 standard drinks     Comment: occacionally    Drug use: No    Sexual activity: Not Currently     Review of Systems   Constitutional: Positive for activity change. Negative for fatigue and fever.   HENT: Negative for trouble swallowing and voice change.    Eyes: Negative for photophobia and visual disturbance.    Respiratory: Negative for cough and shortness of breath.    Cardiovascular: Negative for chest pain and palpitations.   Gastrointestinal: Negative for nausea and vomiting.   Genitourinary: Negative for difficulty urinating and flank pain.   Musculoskeletal: Positive for arthralgias, gait problem, joint swelling and myalgias.   Skin: Negative for color change and rash.   Neurological: Positive for weakness. Negative for facial asymmetry, speech difficulty and numbness.   Psychiatric/Behavioral: Negative for agitation and confusion.     Objective:     Vital Signs (Most Recent):  Temp: 96.8 °F (36 °C) (03/02/20 0857)  Pulse: 62 (03/02/20 0857)  Resp: 16 (03/02/20 0857)  BP: 133/76 (03/02/20 0857)  SpO2: 97 % (03/02/20 0857)    Vital Signs (24h Range):  Temp:  [96.7 °F (35.9 °C)-97.9 °F (36.6 °C)] 96.8 °F (36 °C)  Pulse:  [53-64] 62  Resp:  [16-20] 16  SpO2:  [91 %-98 %] 97 %  BP: (133-162)/(66-77) 133/76     Body mass index is 49.81 kg/m².    Physical Exam   Constitutional: He is oriented to person, place, and time. He appears well-developed and well-nourished.   HENT:   Head: Normocephalic and atraumatic.   Eyes: Right eye exhibits no discharge. Left eye exhibits no discharge.   Neck: Neck supple.   Cardiovascular: Intact distal pulses.   Pulmonary/Chest: Effort normal. No respiratory distress.   Abdominal: Soft. He exhibits no distension.   Musculoskeletal: He exhibits no edema or deformity.   Decreased ROM to BUE/BLE  Generalized weakness   Decreased ROM R wrist >L wrist 2/2 surgery   Neurological: He is alert and oriented to person, place, and time.   Follows commands    Skin: Skin is warm and dry.   Psychiatric: He has a normal mood and affect. His behavior is normal.     NEUROLOGICAL EXAMINATION:     MENTAL STATUS   Oriented to person, place, and time.       Diagnostic Results:   Labs: Reviewed  ECG: Reviewed  X-Ray: Reviewed  US: Reviewed  CT: Reviewed

## 2020-03-02 NOTE — PROGRESS NOTES
Wound consult received on patient's skin breakdown to sacrum per nursing. Moisture associated dermatitis/intertrigo noted to gluteal cleft. Barrier cream utilized by nursing, recommend continuing barrier cream BID/prn per nursing. Nursing to continue care. Re-consult wound care if needed.     03/02/20 1043        Wound 03/02/20 1043 Moisture associated dermatitis Gluteal cleft   Date First Assessed/Time First Assessed: 03/02/20 1043   Primary Wound Type: Moisture associated dermatitis  Location: Gluteal cleft   Wound WDL ex   Drainage Amount None   Drainage Characteristics/Odor No odor   Appearance Moist;Pink   Tissue loss description Partial thickness   Periwound Area Intact   Wound Edges Open   Wound Length (cm) 5 cm   Wound Width (cm) 0.1 cm   Wound Depth (cm) 0.1 cm   Wound Volume (cm^3) 0.05 cm^3   Wound Surface Area (cm^2) 0.5 cm^2   Care Skin Barrier

## 2020-03-02 NOTE — PT/OT/SLP PROGRESS
Physical Therapy Treatment    Patient Name:  Bj Pate Jr.   MRN:  [id number]    Recommendations:     Discharge Recommendations:  rehabilitation facility   Discharge Equipment Recommendations: other (see comments)(TBD)   Barriers to discharge: Inaccessible home and Decreased caregiver support    Assessment:     Bj Pate Jr. is a 70 y.o. male admitted with a medical diagnosis of Polyarticular gout.  He presents with the following impairments/functional limitations:  weakness, impaired functional mobilty, impaired balance, decreased upper extremity function, impaired endurance, impaired self care skills, gait instability, decreased lower extremity function, decreased coordination, pain, impaired fine motor, decreased ROM. Patient tolerated session well. Session limited this date 2* patient having large BM while EOB during session and needing to be helped back to bed for cleaning. Patient would continue to benefit from skilled PT services while in the hospital. At this time, upon d/c he is an appropriate candidate for inpatient rehabilitation in order to progress towards an improved level of functional mobility independence.     Rehab Prognosis: Good; patient would benefit from acute skilled PT services to address these deficits and reach maximum level of function.    Recent Surgery: * No surgery found *      Plan:     During this hospitalization, patient to be seen 3 x/week to address the identified rehab impairments via gait training, therapeutic activities, therapeutic exercises, neuromuscular re-education and progress toward the following goals:    · Plan of Care Expires:  03/29/20    Subjective     Chief Complaint: joint pain   Patient/Family Comments/goals: to get stronger   Pain/Comfort:  · Pain Rating 1: (did not rate)  · Location - Side 1: Bilateral  · Location - Orientation 1: generalized  · Location 1: (joints angeles. wrist, shoulder, knee )  · Pain Addressed 1: Reposition, Pre-medicate for activity,  Distraction  · Pain Rating Post-Intervention 1: 0/10      Objective:     Communicated with RN prior to session.  Patient found supine with bed alarm, peripheral IV, telemetry upon PT entry to room.     General Precautions: Standard, fall   Orthopedic Precautions:N/A   Braces: N/A     Functional Mobility:  · Bed Mobility:     · Rolling Left:  maximal assistance  · Scooting: maximal assistance  · ~4 scoots like EOB closer to HOB with proper technique   · Supine to Sit: maximal assistance and of 2 persons  · Sit to Supine: maximal assistance and of 2 persons  · Balance: sitting EOB - Sup; slouched posture, without BUE support 2* pain     · Seated EOB x12 BLE therex performed: ankle pumps, LAQ, hip flexion, hip add/abd    AM-PAC 6 CLICK MOBILITY  Turning over in bed (including adjusting bedclothes, sheets and blankets)?: 2  Sitting down on and standing up from a chair with arms (e.g., wheelchair, bedside commode, etc.): 2  Moving from lying on back to sitting on the side of the bed?: 2  Moving to and from a bed to a chair (including a wheelchair)?: 1  Need to walk in hospital room?: 1  Climbing 3-5 steps with a railing?: 1  Basic Mobility Total Score: 9       Therapeutic Activities and Exercises:  -Patient educated on the continued role and goal of PT  -Questions and concerns answered within the the PT scope of practice.   -White board updated in patient room to reflect level of assistance needed with nursing.     Patient left supine with all lines intact, call button in reach and RN and PCT notified..    GOALS:   Multidisciplinary Problems     Physical Therapy Goals        Problem: Physical Therapy Goal    Goal Priority Disciplines Outcome Goal Variances Interventions   Physical Therapy Goal     PT, PT/OT Ongoing, Progressing     Description:  Goals to be met by: 3/13/2020     Patient will increase functional independence with mobility by performin. Supine to sit with Moderate Assistance  2. Sit to supine with  Moderate Assistance  3. Sit to stand transfer with Moderate Assistance  4. Bed to chair transfer with Moderate Assistance using LRAD  5. Gait  x 25 feet with Moderate Assistance using LRAD.   6. Lower extremity exercise program x15 reps per handout, with assistance as needed                      Time Tracking:     PT Received On: 03/02/20  PT Start Time: 0916     PT Stop Time: 0947  PT Total Time (min): 31 min     Billable Minutes: Therapeutic Exercise 10 and Neuromuscular Re-education 21    Treatment Type: Treatment  PT/PTA: PT     PTA Visit Number: 0     Ligia Gardner, PT  03/02/2020

## 2020-03-02 NOTE — ASSESSMENT & PLAN NOTE
-Rheumatology consulted  - s/p bedside arthrocentesis positive for urate crystals  -on PO steroids and has improved   -started on colchicine

## 2020-03-02 NOTE — PROGRESS NOTES
[facility]  Rheumatology  Progress Note    Patient Name: Bj Pate Jr.  MRN: [id number]  Admission Date: 2/26/2020  Hospital Length of Stay: 4 days  Code Status: Full Code   Attending Provider: Guilherme Anderson MD  Primary Care Physician: Azikiwe K Lombard, MD  Principal Problem: Polyarticular gout    Subjective:     HPI: Mr. Pate is a 69 yo male with past medical history of morbid obesity, COPD, Gout, HTN, CVA with no residual deficits, CKD(baseline Scr 1.5), and HILARIO on CPAP who presents to [facility] on 2/27 with formerly [facility] complaint of generalized weakness which has been progressively worsening over the past week. The patient says he had carpal tunnel release surgery on 2/19 when it seems all of his symptoms started. His weakness began with difficulty getting out of a chair and moving around and now he cannot move at all. He also had pain and swelling in multiple joints--particularly the wrist, small joints of bilateral hands, elbows, knees, and ankles. He has significant pain, redness, and swelling in his left wrist where he had surgery. He has a history of gout--he says he gets flares about once every month usually monoarticular in his big toe. He has never had arthrocentesis performed and says his gout was a clinical diagnosis. Other symptoms has been having are decreased urine output characterized by feeling he has to urinate but cant with some occasional incontinence, fatigue, and decreased PO intake.  He denies any sick contacts. He denies any diarrhea, nausea, vomiting, fevers, chills, shortness of breath, loss of sensation anywhere, dysuria, headaches, confusion, myalgias.     Of note he presented to the ED on 2/24 for urinary incontinence and although UA not consistent with UTI was sent home with course of bactrim. He says he did not take this medication. He represented on 2/25 again but this time for diffuse arthralgias and generalized weakness but was sent home again after initial workup was  negative.    On arrival here, he was febrile with Tm 100.7, tachycardic in low 100s, SBPs on softer side as low as 90s, satting normally on room air. His labs were significant for elevated WBC 17K, H/H 10.1/32.7, CMP with low albumin 2.2, AST/ and 133, Tbili 2.3, elevated inflammatory markers wit ESR >120 and high-sensitivity, , CK 2681, uric acid 11.4, normal TSH, UA with 2+ blood but 1 RBC. They obtained imaging of his head and sine which showed no acute abnormality. They also obtained abdominal ultrasound which showed hepatosteatosis.     Interval History: no acute overnight events. Patient seems to be improving     Current Facility-Administered Medications   Medication Frequency    acetaminophen tablet 650 mg Q4H PRN    albuterol-ipratropium 2.5 mg-0.5 mg/3 mL nebulizer solution 3 mL Q4H PRN    colchicine capsule/tablet 0.6 mg Daily    dextrose 10% (D10W) Bolus PRN    dextrose 10% (D10W) Bolus PRN    diltiaZEM 24 hr capsule 240 mg Daily    fluticasone furoate-vilanterol 100-25 mcg/dose diskus inhaler 1 puff Daily    gabapentin capsule 600 mg TID    glucagon (human recombinant) injection 1 mg PRN    glucose chewable tablet 16 g PRN    glucose chewable tablet 24 g PRN    heparin (porcine) injection 5,000 Units Q8H    hydrALAZINE tablet 25 mg Q8H PRN    insulin aspart U-100 pen 0-5 Units QID (AC + HS) PRN    insulin detemir U-100 pen 10 Units BID    lisinopril tablet 40 mg Daily    ondansetron injection 4 mg Q8H PRN    oxyCODONE immediate release tablet 10 mg Q6H PRN    oxyCODONE immediate release tablet 5 mg Q6H PRN    predniSONE tablet 40 mg Daily    senna-docusate 8.6-50 mg per tablet 1 tablet BID    sodium chloride 0.9% flush 10 mL PRN    tamsulosin 24 hr capsule 0.4 mg Daily    vitamin D 1000 units tablet 1,000 Units Daily     Facility-Administered Medications Ordered in Other Encounters   Medication Frequency    0.9%  NaCl infusion Continuous     Objective:     Vital  Signs (Most Recent):  Temp: 96.8 °F (36 °C) (03/02/20 0857)  Pulse: 68 (03/02/20 1042)  Resp: 18 (03/02/20 1042)  BP: 133/76 (03/02/20 0857)  SpO2: 97 % (03/02/20 0857)  O2 Device (Oxygen Therapy): CPAP (03/02/20 0227) Vital Signs (24h Range):  Temp:  [96.7 °F (35.9 °C)-97.9 °F (36.6 °C)] 96.8 °F (36 °C)  Pulse:  [53-68] 68  Resp:  [16-20] 18  SpO2:  [91 %-98 %] 97 %  BP: (133-150)/(66-76) 133/76     Weight: (!) 153 kg (337 lb 4.9 oz) (03/02/20 0400)  Body mass index is 49.81 kg/m².  Body surface area is 2.73 meters squared.      Intake/Output Summary (Last 24 hours) at 3/2/2020 1133  Last data filed at 3/2/2020 0545  Gross per 24 hour   Intake 520 ml   Output 3075 ml   Net -2555 ml       Physical Exam   Constitutional: He is oriented to person, place, and time and well-developed, well-nourished, and in no distress.   He appears more awake, comfortable and less acutely ill today compared to yesterday   HENT:   Head: Normocephalic and atraumatic.   Eyes: Conjunctivae and EOM are normal. Pupils are equal, round, and reactive to light. No scleral icterus.   Neck: Normal range of motion. Neck supple.   Cardiovascular: Normal rate, regular rhythm, normal heart sounds and intact distal pulses.  Exam reveals no gallop and no friction rub.    No murmur heard.  Pulmonary/Chest: Effort normal and breath sounds normal. No respiratory distress. He has no wheezes. He has no rales.   Abdominal: Soft. Bowel sounds are normal. He exhibits no distension. There is no tenderness.   Lymphadenopathy:     He has no cervical adenopathy.   Neurological: He is alert and oriented to person, place, and time.   Skin: Skin is warm and dry.     Musculoskeletal: Normal range of motion. He exhibits no edema.   Still has synovitis in his b/l wrists, hands (MCPs/PIP), knees, ankles.- but much improved.   He has greater range of motion          Significant Labs:  BMP:   Recent Labs   Lab 03/02/20  0416   *   *   K 4.8      CO2 22*    BUN 56*   CREATININE 1.2   CALCIUM 9.0   MG 1.9     CBC:   Recent Labs   Lab 03/02/20  0416   WBC 14.01*   HGB 9.9*   HCT 31.4*          Significant Imaging:  Imaging results within the past 24 hours have been reviewed.    Assessment/Plan:     Polyarthropathy or polyarthritis of multiple sites  Mr. rodriguez is a 71 yo male with history of gout(not on allopurinol) which usually flares 1x/month in one joint, often his great toe, who presents to [facility] 2/26 with chief complaint of multiple arthralgias, weakness involving upper and lower extremities, fatigue, and decreased PO intake. His ESR and high-sensitivity CRP are significantly elevated at >120 and 416 respectively. He also has elevated uric acid. He has multiple joints--primarily his hands, feet, knees, and ankles which are warm, swollen, and exquisitely tender. He is s/p arthrocentesis of his right knee which revealed monosodium urate crystals(pictured above) consistent with polyarticular gout. He has been developing fevers since admission which can occur in the setting of gout. It is possible his symptoms of weakness could be related to pain. It is unclear why his CK levels are elevated but low suspicion for inflammatory myopathy given the acuity of his presentation. His CPKs have downtrend with fluids     -Synovial fluid of right knee with 11467 WBCs, 99% segs. Cultures without growth thus far  -s/p left wrist aspiration by ortho: chalk white material + urate crystals s/p steroid injection into joint   -s/p 2 doses of IV solumedrol 80 mg patient has significant improvement in his pain and ability to move his extremities    Recommendations:  -Continue prednisone 40mg daily (started 2/29), would continue for another week, then decrease by 10mg per week   -Continue colchicine 0.6 mg daily till follow up   -Follow up synovial fluid cultures  -Once this acute attack resolves, patient would ideally be started on allopurinol as outpatient, HLA  pending    -Avoid NSAIDs in the setting of CKD  -Patient on thiazide diuretic at home. Would consider stopping this to decrease frequency of gout flares in the future.           Thank you for this interesting consult. We will sign off at this time. Please page with further questions.     Jasiel Long MD  Rheumatology  [facility]

## 2020-03-02 NOTE — PLAN OF CARE
[facility]    FACILITY TRANSFER ORDERS      Patient Name: Bj Pate Jr.  YOB: 1950    PCP: Azikiwe K Lombard, MD   PCP Address: [address] / [place] [facility]  PCP Phone Number: [phone]  PCP Fax: [phone]    Encounter Date: 03/02/2020    Admit to: Rehab    Vital Signs:  Routine    Diagnoses:   Active Hospital Problems    Diagnosis  POA    *Polyarticular gout [M10.00]  Yes    Acute idiopathic gout of multiple sites [M10.09]  Yes    Chronic gout of left wrist [M1A.0320]  Yes    Polyarthropathy or polyarthritis of multiple sites [M13.0]  Yes    Acute kidney injury superimposed on chronic kidney disease [N17.9, N18.9]  Yes    Non-traumatic rhabdomyolysis [M62.82]  Yes    Transaminitis [R74.0]  Yes    Urinary retention [R33.9]  Yes    Generalized weakness [R53.1]  Yes    Left carpal tunnel syndrome [G56.02]  Yes    Decreased functional mobility and endurance [Z74.09]  Yes    COPD, moderate [J44.9]  Yes    Obstructive sleep apnea on CPAP: setting = 8 [G47.33, Z99.89]  Not Applicable      Resolved Hospital Problems   No resolved problems to display.       Allergies:  Review of patient's allergies indicates:   Allergen Reactions    Tomato (solanum lycopersicum) Hives    Naproxen Hives    Shrimp Other (See Comments)       Diet: diabetic diet: 2000 calorie    Activities: Activity as tolerated    Nursing: Assist feeding patient     Labs: CMP Once in one week    CONSULTS:    Physical Therapy to evaluate and treat.  and Occupational Therapy to evaluate and treat.    MISCELLANEOUS CARE:  Rivera Care: Empty Rivera bag every shift. Change Rivera every month.   Patient has newly diagnosed urinary retention, plan to follow up with urology for voiding trials in 1 week    WOUND CARE ORDERS  sacrum skin breakdown - Moisture associated dermatitis/intertrigo noted to gluteal cleft. Barrier cream utilized by nursing, recommend continuing barrier cream BID/prn per nursing.  Nursing to continue care.     Medications: Review discharge medications with patient and family and provide education.      Current Discharge Medication List      START taking these medications    Details   insulin detemir U-100 (LEVEMIR FLEXTOUCH) 100 unit/mL (3 mL) SubQ InPn pen Inject 10 Units into the skin 2 (two) times daily.  Refills: 0      lisinopril (PRINIVIL,ZESTRIL) 40 MG tablet Take 1 tablet (40 mg total) by mouth once daily.  Qty: 90 tablet, Refills: 3      oxyCODONE (ROXICODONE) 10 mg Tab immediate release tablet Take 1 tablet (10 mg total) by mouth every 6 (six) hours as needed.  Refills: 0    Comments: Quantity prescribed more than 7 day supply?      predniSONE (DELTASONE) 10 MG tablet Take 40 mg daily for 1 week  Qty: 100 tablet, Refills: 0    Comments: Take 40 mg for 7 days then 30 mg for 7 days then 20 mg for 7 days then 10 mg for 7 days then stop      tamsulosin (FLOMAX) 0.4 mg Cap Take 1 capsule (0.4 mg total) by mouth once daily.  Qty: 30 capsule, Refills: 11         CONTINUE these medications which have CHANGED    Details   atorvastatin (LIPITOR) 40 MG tablet Take 1 tablet (40 mg total) by mouth once daily. Hold for 5 days then resume  Qty: 90 tablet, Refills: 1    Associated Diagnoses: Dyslipidemia      COLCRYS 0.6 mg tablet Take 1 tablet (0.6 mg total) by mouth once daily. Daily until you see rheumatology  Qty: 30 tablet, Refills: 11    Associated Diagnoses: Idiopathic chronic gout of multiple sites with tophus         CONTINUE these medications which have NOT CHANGED    Details   ascorbic acid, vitamin C, (VITAMIN C) 500 MG tablet Take 500 mg by mouth once daily.      aspirin (ECOTRIN) 81 MG EC tablet Take 81 mg by mouth once daily.      diltiaZEM (CARTIA XT) 240 MG 24 hr capsule Take 1 capsule (240 mg total) by mouth once daily.  Qty: 90 capsule, Refills: 1    Associated Diagnoses: Essential hypertension      gabapentin (NEURONTIN) 600 MG tablet Take 1 tablet (600 mg total) by mouth 3  (three) times daily.  Qty: 90 tablet, Refills: 11    Comments: Please consider 90 day supplies to promote better adherence  Associated Diagnoses: Diabetic polyneuropathy associated with type 2 diabetes mellitus      vitamin D (VITAMIN D3) 1000 units Tab Take 1,000 Units by mouth once daily.      ADVAIR DISKUS 250-50 mcg/dose diskus inhaler Controller  Qty: 60 each, Refills: 3    Associated Diagnoses: COPD, moderate      albuterol (PROAIR HFA) 90 mcg/actuation inhaler Inhale 2 puffs into the lungs every 6 (six) hours as needed for Wheezing. Rescue  Qty: 1 each, Refills: 11      blood pressure test kit-large Kit 1 Device by Misc.(Non-Drug; Combo Route) route 2 (two) times daily.  Qty: 1 each, Refills: 0      blood sugar diagnostic Strp To check BG 2 times daily, to use with insurance preferred meter  Qty: 200 strip, Refills: 3    Associated Diagnoses: Type 2 diabetes mellitus with stage 3 chronic kidney disease, without long-term current use of insulin      blood-glucose meter kit To check BG 2 times daily, to use with insurance preferred meter  Qty: 1 each, Refills: 0    Associated Diagnoses: Type 2 diabetes mellitus with stage 3 chronic kidney disease, without long-term current use of insulin      diclofenac sodium (VOLTAREN) 1 % Gel APPLY  2 GRAMS  TOPICALLY TO AFFECTED AREA ONCE DAILY  Qty: 300 g, Refills: 0    Comments: Please consider 90 day supplies to promote better adherence  Associated Diagnoses: Primary osteoarthritis involving multiple joints      lancets Misc To check BG 2 times daily, to use with insurance preferred meter  Qty: 200 each, Refills: 3    Associated Diagnoses: Type 2 diabetes mellitus with stage 3 chronic kidney disease, without long-term current use of insulin         STOP taking these medications       acetaminophen-codeine 300-30mg (TYLENOL #3) 300-30 mg Tab Comments:   Reason for Stopping:         lisinopril-hydrochlorothiazide (PRINZIDE,ZESTORETIC) 20-25 mg Tab Comments:   Reason for  Stopping:         nitrofurantoin, macrocrystal-monohydrate, (MACROBID) 100 MG capsule Comments:   Reason for Stopping:         FLUAD 4617-2210, 65 YR UP,,PF, 45 mcg (15 mcg x 3)/0.5 mL Syrg Comments:   Reason for Stopping:         FLUZONE HIGH-DOSE 2017-18, PF, 180 mcg/0.5 mL vaccine Comments:   Reason for Stopping:         HYDROcodone-acetaminophen (NORCO) 5-325 mg per tablet Comments:   Reason for Stopping:         sulfamethoxazole-trimethoprim 800-160mg (BACTRIM DS) 800-160 mg Tab Comments:   Reason for Stopping:                    _________________________________  Guilherme Anderson MD  03/02/2020

## 2020-03-02 NOTE — ASSESSMENT & PLAN NOTE
See hospital course for functional status.    Recommendations  -  Encourage mobility, OOB in chair, and early ambulation as appropriate  -  PT/OT evaluate and treat  -  Pain management  -  DVT prophylaxis if appropriate   -  Monitor for and prevent skin breakdown and pressure ulcers  · Early mobility, repositioning/weight shifting every 20-30 minutes when sitting, turn patient every 2 hours, proper mattress/overlay and chair cushioning, pressure relief/heel protector boots

## 2020-03-02 NOTE — PROGRESS NOTES
[facility] Hospital Medicine                                                                     Progress Note     Team: [facility] G Guilherme Anderson MD   Admit Date: 2/26/2020   Hospital Day: 4  GLORIA: 3/3/2020   Code status: Full Code   Principal Problem: Polyarticular gout     SUMMARY:     69yo male with a past medical history of CVA without residual deficit, COPD, HILARIO on CPAP, Gout, DDD of Lumbar, Cervical Radiculopathy, HTN and recent Left CTS surgery presented with rapid onset polyarthritis of his UE and LE with associated fever, fatigue and decreased po intake. Presented to multiple ED's in the week with negative work up, thus sent home. He attributes significant weakness secondary to pain. Pain rendered him immobile for almost 2 day unsupervised at home (he was unable to get out of his chair). Physical exam with asymmetric swelling on his UE and LE. Labs noting leukocytosis 17, Cr 2.2 (baseline 1.5), , CPK 2343, . CT A/P unremarkable. Admitted to hospital medicine. Rheumatology consulted, suspecting polyarticular gout. S/p bedside arthrocentesis by Rhem positive for urate crystals confirmed diagnosis. Fever likely from acute gout flare. Elevated CPK suspected from prolonged immobility. AUS and acute hep negative. Suspecting LFT elevation from rhabdo. Rheum rec IV steroids (IV solumedrol 80 mg x 3 days), and adding colchicine when renal function improved. Left UE with no DVT. Blood cultures 2/4 from admission day returned positive for staph. Patient was subsequently started on vancomycin and repeat bcx ordered, NGTD. Rheumatology rec switched IV solumedrol to po 40 steroids considering his possible bacteremia. Ortho consulted for bacteremia with recent wrist surgery. Continue fluids and IV vancomycin for now. Supportive care. Ortho aspirated his  left wrist s/p. Labs positive for intracellular and extracellular gouty crystals consistent with gout. Unable to before cell count as specimen was solid. Intra-articular corticosteroid was then injected into the wrist. Ortho rec follow up Dr. Arora OP. BCx returned as contaminant. D/c vancomycin. Started colchicine as per rheumatology as his renal function is now back to baseline. Rivera removed, but failed voiding trail. Discussed with urology and they rec keeping for now as he failed voiding trials, outpatient fu at urology clinic for voiding trials. LFTs worsening discussed with hepatology, and they rec repeat US with doppler, JAGDISH, Anti-smooth Ab, INR. His LFTs are now improving. Repeat Liver ultrasound with doppler neg for acute pathology. At this time he is medically stable for discharge to Rehab.       SUBJECTIVE:     Pt was seen and examined at bedside. No acute events overnight. HDS and afebrile. Comfortable. Lose stools endorsed today, stool stimulants discontinued, also colchicine can cause this. Re-assured patient. No other new complaints. Rivera remains in place draining clear urine. ROM improving. LTFs now improving. Pending rehab.     ROS (Positive in Bold, otherwise negative)  Pain Scale: 0 /10   Constitutional: fever (resolved), generalized weakness, chills, night sweats  CV: chest pain, edema, palpitations  Resp: SOB, cough, sputum production  GI: changes in appetite, NVDC, pain, melena, hematochezia, GERD, hematemesis  : Dysuria, hematuria, urinary urgency, frequency  MSK: arthralgia/myalgia, joint swelling  SKIN: rashes, pruritis, petechiae   Neuro/Psych: FND, anxiety, depression      OBJECTIVE:     Vitals:  Temp:  [96.5 °F (35.8 °C)-97.9 °F (36.6 °C)]   Pulse:  [53-68]   Resp:  [16-20]   BP: (133-150)/(66-80)   SpO2:  [91 %-98 %]      I & O (Last 24H):     Intake/Output Summary (Last 24 hours) at 3/2/2020 132  Last data filed at 3/2/2020 0501  Gross per 24 hour   Intake 280 ml   Output 2175 ml    Net -1895 ml       GEN: AA male  in no acute distress. Nontoxic. Resting in bed. Cooperative. Appears comfortable.  HEENT: NCAT. PERRL. EOMI. Conjunctivae/corneas clear, sclera Anicteric.  CVS: RRR. Normal s1 s2 no murmur, click, rub or gallop  LUNG: CTAB. Normal respiratory effort. Anterior exam with no wheezes, rhonchi, or crackles.  ABD: Normoactive BS, soft, NT, ND, no masses or organomegaly.  EXT: Joint swelling noted on multiple UE joints improving. Left arm swollen compared to right improving. ROM continues to improve gradually.  SKIN: color, texture, turgor normal. No rashes or lesions  NEURO: Alert, oriented x 4, grossly normal      All recent labs and imaging has been reviewed.     Recent Results (from the past 24 hour(s))   Protime-INR    Collection Time: 03/01/20  3:57 PM   Result Value Ref Range    Prothrombin Time 11.0 9.0 - 12.5 sec    INR 1.1 0.8 - 1.2   POCT glucose    Collection Time: 03/01/20  5:27 PM   Result Value Ref Range    POCT Glucose 144 (H) 70 - 110 mg/dL   POCT glucose    Collection Time: 03/01/20  8:58 PM   Result Value Ref Range    POCT Glucose 197 (H) 70 - 110 mg/dL   Comprehensive Metabolic Panel (CMP)    Collection Time: 03/02/20  4:16 AM   Result Value Ref Range    Sodium 135 (L) 136 - 145 mmol/L    Potassium 4.8 3.5 - 5.1 mmol/L    Chloride 104 95 - 110 mmol/L    CO2 22 (L) 23 - 29 mmol/L    Glucose 178 (H) 70 - 110 mg/dL    BUN, Bld 56 (H) 8 - 23 mg/dL    Creatinine 1.2 0.5 - 1.4 mg/dL    Calcium 9.0 8.7 - 10.5 mg/dL    Total Protein 6.4 6.0 - 8.4 g/dL    Albumin 1.8 (L) 3.5 - 5.2 g/dL    Total Bilirubin 0.6 0.1 - 1.0 mg/dL    Alkaline Phosphatase 103 55 - 135 U/L     (H) 10 - 40 U/L     (H) 10 - 44 U/L    Anion Gap 9 8 - 16 mmol/L    eGFR if African American >60.0 >60 mL/min/1.73 m^2    eGFR if non African American >60.0 >60 mL/min/1.73 m^2   Magnesium    Collection Time: 03/02/20  4:16 AM   Result Value Ref Range    Magnesium 1.9 1.6 - 2.6 mg/dL   Phosphorus     Collection Time: 03/02/20  4:16 AM   Result Value Ref Range    Phosphorus 2.9 2.7 - 4.5 mg/dL   CBC with Automated Differential    Collection Time: 03/02/20  4:16 AM   Result Value Ref Range    WBC 14.01 (H) 3.90 - 12.70 K/uL    RBC 3.55 (L) 4.60 - 6.20 M/uL    Hemoglobin 9.9 (L) 14.0 - 18.0 g/dL    Hematocrit 31.4 (L) 40.0 - 54.0 %    Mean Corpuscular Volume 89 82 - 98 fL    Mean Corpuscular Hemoglobin 27.9 27.0 - 31.0 pg    Mean Corpuscular Hemoglobin Conc 31.5 (L) 32.0 - 36.0 g/dL    RDW 14.6 (H) 11.5 - 14.5 %    Platelets 341 150 - 350 K/uL    MPV 11.2 9.2 - 12.9 fL    Immature Granulocytes CANCELED 0.0 - 0.5 %    Immature Grans (Abs) CANCELED 0.00 - 0.04 K/uL    Lymph # CANCELED 1.0 - 4.8 K/uL    Mono # CANCELED 0.3 - 1.0 K/uL    Eos # CANCELED 0.0 - 0.5 K/uL    Baso # CANCELED 0.00 - 0.20 K/uL    nRBC 0 0 /100 WBC    Gran% 81.0 (H) 38.0 - 73.0 %    Lymph% 11.0 (L) 18.0 - 48.0 %    Mono% 7.0 4.0 - 15.0 %    Eosinophil% 0.0 0.0 - 8.0 %    Basophil% 0.0 0.0 - 1.9 %    Myelocytes 1.0 %    Platelet Estimate Appears normal     Aniso Slight     Poik Slight     Poly Occasional     Hypo Occasional     Ovalocytes Occasional     Target Cells Occasional     Differential Method Manual    POCT glucose    Collection Time: 03/02/20 12:07 PM   Result Value Ref Range    POCT Glucose 162 (H) 70 - 110 mg/dL       Recent Labs   Lab 02/29/20  2149 03/01/20  0824 03/01/20  1237 03/01/20  1727 03/01/20  2058 03/02/20  1207   POCTGLUCOSE 192* 180* 216* 144* 197* 162*       Hemoglobin A1C   Date Value Ref Range Status   08/19/2019 5.6 4.0 - 5.6 % Final     Comment:     ADA Screening Guidelines:  5.7-6.4%  Consistent with prediabetes  >or=6.5%  Consistent with diabetes  High levels of fetal hemoglobin interfere with the HbA1C  assay. Heterozygous hemoglobin variants (HbS, HgC, etc)do  not significantly interfere with this assay.   However, presence of multiple variants may affect accuracy.     01/25/2019 5.6 4.0 - 5.6 % Final      Comment:     ADA Screening Guidelines:  5.7-6.4%  Consistent with prediabetes  >or=6.5%  Consistent with diabetes  High levels of fetal hemoglobin interfere with the HbA1C  assay. Heterozygous hemoglobin variants (HbS, HgC, etc)do  not significantly interfere with this assay.   However, presence of multiple variants may affect accuracy.     07/25/2018 5.6 4.0 - 5.6 % Final     Comment:     ADA Screening Guidelines:  5.7-6.4%  Consistent with prediabetes  >or=6.5%  Consistent with diabetes  High levels of fetal hemoglobin interfere with the HbA1C  assay. Heterozygous hemoglobin variants (HbS, HgC, etc)do  not significantly interfere with this assay.   However, presence of multiple variants may affect accuracy.          Active Hospital Problems    Diagnosis  POA    *Polyarticular gout [M10.00]  Yes    Acute idiopathic gout of multiple sites [M10.09]  Yes    Chronic gout of left wrist [M1A.0320]  Yes    Polyarthropathy or polyarthritis of multiple sites [M13.0]  Yes    Acute kidney injury superimposed on chronic kidney disease [N17.9, N18.9]  Yes    Non-traumatic rhabdomyolysis [M62.82]  Yes    Transaminitis [R74.0]  Yes    Urinary retention [R33.9]  Yes    Generalized weakness [R53.1]  Yes    Left carpal tunnel syndrome [G56.02]  Yes    Decreased functional mobility and endurance [Z74.09]  Yes    COPD, moderate [J44.9]  Yes    Obstructive sleep apnea on CPAP: setting = 8 [G47.33, Z99.89]  Not Applicable      Resolved Hospital Problems   No resolved problems to display.          ASSESSMENT AND PLAN:     Polyarticular Gout  Acute febrile episode   - Presented with rapid onset polyarthritis of his UE and LE with associated fever, fatigue and decreased po intake.   - Physical exam with asymmetric swelling on his UE and LE.   - Labs noting leukocytosis 17, Cr 2.2 (baseline 1.5), , CPK 2343, .   - CT A/P unremarkable.   - Rheumatology consulted, s/p bedside arthrocentesis positive for urate  crystals confirmed diagnosis.   - Fever likely from acute gout flare  - Left UE with no DVT.  - Rheum rec IV steroids (IV solumedrol 80 mg x 3 days) started now changed to PO steroids  - Significant improvement since starting steroids  - Start colchicine now since renal function near baseline   - Avoiding NSAIDS use to KISHA  - Initially on fluids now discontinued, encourage increase po intake   - Agree with d/c-ing thiazide as this can increase uric acid in serum and dehydrate patient  - Follow up synovial fluid cultures  - Once on colchicine and gout flare resolves, will need to be on allopurinol or other newer agents for serum uric acid reduction, this can be done outpatient   - Supportive care    GPC bacteremia - contaminant   - Blood cultures 2/4 from admission day returned positive for staph, pending final results  - Patient was subsequently started on vancomycin and repeat bcx ordered, NGTD  - Ortho consulted for bacteremia with recent wrist surgery s/p wrist aspiration with gout  - Bcx returned as contaminant, dc abx     Acute kidney injury superimposed on chronic kidney disease - resolved  - Creatinine 2.2  (baseline 1.5)   - Suspecting secondary to dehydration and mild rhabdo   - CT abdomen unremarkable  - Resolved with fluids, now fluids discontinued, encourage oral fluid intake  - monitor strict I/Os  - Rivera placed due to retention as below  - daily labs    Transaminitis - improving   - , ,  ;  TB 2.3  - CT abdomen unremarkable  - acute hepatitis panel neg  - AUS with hepatosteatosis   - Initially suspected from rhabdo  - Initially improved now LTFs worsening, no RUQ abdominal pain, 2/2 to ?solumedrol which is now discontinued  - Injury pattern hepatocellular injury more than cholestatic  - viral panel negative  - Discussed with hepatology, and they rec repeat US with doppler, JAGDISH, Anti-smooth Ab, INR.  - Now his LFTs are improving and repeat liver US with doppler nonacute  - cont  supportive care  - repeat labs in the AM    Urinary Retention  - Patient retaining urine on admit s/p bryant placement  - start tamsulosin   - Bryant removed 2/29, but failed voiding trail and was replaced by overnight RN. No notes in chart or amount retained charted  - Discussed with urology and they rec keeping for now as he failed voiding trials, outpatient fu at urology clinic for voiding trials.   - Dc patient on bryant, and follow up with urology outpatient     Non-traumatic rhabdomyolysis - improving   - CPK 2343  - from prolonged immobility, he apparently was stuck in his chair for over 15 hrs due to pain  - CPK improving with fluids  - D/c IVFs     Generalized weakness  - see above      Left carpal tunnel syndrome  - recent left carpal tunnel release  - incisions clean dry and intact without evidence of acute infection  - ortho consulted, [place] with initially though bacteremia and recent procedure  - s/p wrist aspiration noting gout  - treat gout as above     COPD, moderate  - Duonebs prn     Obstructive sleep apnea on CPAP  - setting = 8  - CPAP HS        Prophylaxis- Hep TID  Code Status- Full Code   Discharge plan and follow up - d/c to rehab once medically stable as per PT OT    Guilherme Anderson MD  Hospital Medicine Staff  Pager [phone]

## 2020-03-02 NOTE — SUBJECTIVE & OBJECTIVE
Interval History: no acute overnight events. Patient seems to be improving     Current Facility-Administered Medications   Medication Frequency    acetaminophen tablet 650 mg Q4H PRN    albuterol-ipratropium 2.5 mg-0.5 mg/3 mL nebulizer solution 3 mL Q4H PRN    colchicine capsule/tablet 0.6 mg Daily    dextrose 10% (D10W) Bolus PRN    dextrose 10% (D10W) Bolus PRN    diltiaZEM 24 hr capsule 240 mg Daily    fluticasone furoate-vilanterol 100-25 mcg/dose diskus inhaler 1 puff Daily    gabapentin capsule 600 mg TID    glucagon (human recombinant) injection 1 mg PRN    glucose chewable tablet 16 g PRN    glucose chewable tablet 24 g PRN    heparin (porcine) injection 5,000 Units Q8H    hydrALAZINE tablet 25 mg Q8H PRN    insulin aspart U-100 pen 0-5 Units QID (AC + HS) PRN    insulin detemir U-100 pen 10 Units BID    lisinopril tablet 40 mg Daily    ondansetron injection 4 mg Q8H PRN    oxyCODONE immediate release tablet 10 mg Q6H PRN    oxyCODONE immediate release tablet 5 mg Q6H PRN    predniSONE tablet 40 mg Daily    senna-docusate 8.6-50 mg per tablet 1 tablet BID    sodium chloride 0.9% flush 10 mL PRN    tamsulosin 24 hr capsule 0.4 mg Daily    vitamin D 1000 units tablet 1,000 Units Daily     Facility-Administered Medications Ordered in Other Encounters   Medication Frequency    0.9%  NaCl infusion Continuous     Objective:     Vital Signs (Most Recent):  Temp: 96.8 °F (36 °C) (03/02/20 0857)  Pulse: 68 (03/02/20 1042)  Resp: 18 (03/02/20 1042)  BP: 133/76 (03/02/20 0857)  SpO2: 97 % (03/02/20 0857)  O2 Device (Oxygen Therapy): CPAP (03/02/20 0227) Vital Signs (24h Range):  Temp:  [96.7 °F (35.9 °C)-97.9 °F (36.6 °C)] 96.8 °F (36 °C)  Pulse:  [53-68] 68  Resp:  [16-20] 18  SpO2:  [91 %-98 %] 97 %  BP: (133-150)/(66-76) 133/76     Weight: (!) 153 kg (337 lb 4.9 oz) (03/02/20 0400)  Body mass index is 49.81 kg/m².  Body surface area is 2.73 meters squared.      Intake/Output Summary (Last  24 hours) at 3/2/2020 1133  Last data filed at 3/2/2020 0545  Gross per 24 hour   Intake 520 ml   Output 3075 ml   Net -2555 ml       Physical Exam   Constitutional: He is oriented to person, place, and time and well-developed, well-nourished, and in no distress.   He appears more awake, comfortable and less acutely ill today compared to yesterday   HENT:   Head: Normocephalic and atraumatic.   Eyes: Conjunctivae and EOM are normal. Pupils are equal, round, and reactive to light. No scleral icterus.   Neck: Normal range of motion. Neck supple.   Cardiovascular: Normal rate, regular rhythm, normal heart sounds and intact distal pulses.  Exam reveals no gallop and no friction rub.    No murmur heard.  Pulmonary/Chest: Effort normal and breath sounds normal. No respiratory distress. He has no wheezes. He has no rales.   Abdominal: Soft. Bowel sounds are normal. He exhibits no distension. There is no tenderness.   Lymphadenopathy:     He has no cervical adenopathy.   Neurological: He is alert and oriented to person, place, and time.   Skin: Skin is warm and dry.     Musculoskeletal: Normal range of motion. He exhibits no edema.   Still has synovitis in his b/l wrists, hands (MCPs/PIP), knees, ankles.- but much improved.   He has greater range of motion          Significant Labs:  BMP:   Recent Labs   Lab 03/02/20 0416   *   *   K 4.8      CO2 22*   BUN 56*   CREATININE 1.2   CALCIUM 9.0   MG 1.9     CBC:   Recent Labs   Lab 03/02/20 0416   WBC 14.01*   HGB 9.9*   HCT 31.4*          Significant Imaging:  Imaging results within the past 24 hours have been reviewed.

## 2020-03-02 NOTE — PLAN OF CARE
Went over plan of care - all questions answered. No complaints , falls or injuries. Pt has been npo since midnight. Will continue to monitor

## 2020-03-02 NOTE — PLAN OF CARE
Problem: Physical Therapy Goal  Goal: Physical Therapy Goal  Description  Goals to be met by: 3/13/2020     Patient will increase functional independence with mobility by performin. Supine to sit with Moderate Assistance  2. Sit to supine with Moderate Assistance  3. Sit to stand transfer with Moderate Assistance  4. Bed to chair transfer with Moderate Assistance using LRAD  5. Gait  x 25 feet with Moderate Assistance using LRAD.   6. Lower extremity exercise program x15 reps per handout, with assistance as needed     Outcome: Ongoing, Progressing   Patient tolerated treatment well. Established POC and goals reviewed and remain appropriate. Plan is to continue to improve patient's functional mobility capabilities.      Ligia Gardner, PT, DPT  3/2/2020

## 2020-03-02 NOTE — PLAN OF CARE
03/02/20 1604   Final Note   Assessment Type Final Discharge Note   Anticipated Discharge Disposition Rehab   What phone number can be called within the next 1-3 days to see how you are doing after discharge?   ([facility] [phone])   Hospital Follow Up  Appt(s) scheduled? No   Discharge plans and expectations educations in teach back method with documentation complete? Yes   Right Care Referral Info   Post Acute Recommendation IRF     Patient transportation arranged for 4:30pm nurse provide with number to call report [phone]  Teri Reynolds RN, BSN     Ext [phone]

## 2020-03-03 ENCOUNTER — TELEPHONE (OUTPATIENT)
Dept: ORTHOPEDICS | Facility: CLINIC | Age: 70
End: 2020-03-03

## 2020-03-03 ENCOUNTER — DOCUMENTATION ONLY (OUTPATIENT)
Dept: ORTHOPEDICS | Facility: CLINIC | Age: 70
End: 2020-03-03

## 2020-03-03 LAB
ANA SER QL IF: NORMAL
BACTERIA BLD CULT: NORMAL
SMOOTH MUSCLE AB TITR SER IF: ABNORMAL {TITER}

## 2020-03-03 NOTE — TELEPHONE ENCOUNTER
----- Message from Felecia Engel sent at 3/3/2020  4:00 PM CST -----  Contact: Alisa with [facility]  Name of Who is Calling: Alisa with [facility]      What is the request in detail: Alisa is calling to speak to staff in regards to pt is in inpatient rehab. He had carpule tunnel surgery on 3/19, and she would like to know if they can remove sutures ad pt cannot travel to appointment  .... Please call to further assist . ALECIA      Can the clinic reply by [facility]: N      [facility]: [phone]

## 2020-03-03 NOTE — PROGRESS NOTES
Lm on vm informing Alisa w/[facility] that Mr Herlinda need to come in for his appt per Radha (PA).  Alisa was advised to call office back with any questions or concerns regarding this matter.

## 2020-03-04 ENCOUNTER — DOCUMENTATION ONLY (OUTPATIENT)
Dept: REHABILITATION | Facility: HOSPITAL | Age: 70
End: 2020-03-04

## 2020-03-04 LAB
BACTERIA BLD CULT: NORMAL
BACTERIA BLD CULT: NORMAL

## 2020-03-04 NOTE — PROGRESS NOTES
Bj cancelled his Occupational Therapy evaluation today, stating he goes someplace else for therapy.

## 2020-03-04 NOTE — DISCHARGE SUMMARY
[facility]  Discharge Summary  Hospital Medicine    Patient Name: Bj Pate Jr.  YOB: 1950    Admit Date: 2/26/2020    Discharge Date and Time: 3/2/2020  5:17 PM    Discharge Attending Physician: Guilherme Anderson MD     Team: [facility]    Reason for Admission:   Chief Complaint   Patient presents with    Fatigue     seen at several hospitals, x4 days, can not move extremities, left knee pain,        Active Hospital Problems    Diagnosis  POA    *Polyarticular gout [M10.00]  Yes    Acute idiopathic gout of multiple sites [M10.09]  Yes    Chronic gout of left wrist [M1A.0320]  Yes    Polyarthropathy or polyarthritis of multiple sites [M13.0]  Yes    Acute kidney injury superimposed on chronic kidney disease [N17.9, N18.9]  Yes    Non-traumatic rhabdomyolysis [M62.82]  Yes    Transaminitis [R74.0]  Yes    Urinary retention [R33.9]  Yes    Generalized weakness [R53.1]  Yes    Left carpal tunnel syndrome [G56.02]  Yes    Decreased functional mobility and endurance [Z74.09]  Yes    COPD, moderate [J44.9]  Yes    Obstructive sleep apnea on CPAP: setting = 8 [G47.33, Z99.89]  Not Applicable      Resolved Hospital Problems   No resolved problems to display.       HPI:   69yo male with a past medical history of CVA without residual deficit, COPD, HILARIO on CPAP, Gout, DDD of Lumbar, Cervical Radiculopathy, HTN and recent Left CTS surgery presented with rapid onset polyarthritis of his UE and LE with associated fever, fatigue and decreased po intake. Presented to multiple ED's in the week with negative work up, thus sent home. He attributes significant weakness secondary to pain. Pain rendered him immobile for almost 2 day unsupervised at home (he was unable to get out of his chair). Physical exam with asymmetric swelling on his UE and LE. Labs noting leukocytosis 17, Cr 2.2 (baseline 1.5), , CPK 2343, . CT A/P unremarkable.      Hospital Course:   Admitted to hospital  medicine. Rheumatology consulted, suspecting polyarticular gout. S/p bedside arthrocentesis by Rhem positive for urate crystals confirmed diagnosis. Fever likely from acute gout flare. Elevated CPK suspected from prolonged immobility. AUS and acute hep negative. Suspecting LFT elevation from rhabdo. Rheum rec IV steroids (IV solumedrol 80 mg x 3 days), and adding colchicine when renal function improved. Left UE with no DVT. Blood cultures 2/4 from admission day returned positive for staph. Patient was subsequently started on vancomycin and repeat bcx ordered, NGTD. Rheumatology rec switched IV solumedrol to po 40 steroids considering his possible bacteremia. Ortho consulted for bacteremia with recent wrist surgery. Continue fluids and IV vancomycin for now. Supportive care. Ortho aspirated his left wrist s/p. Labs positive for intracellular and extracellular gouty crystals consistent with gout. Unable to before cell count as specimen was solid. Intra-articular corticosteroid was then injected into the wrist. Ortho rec follow up Dr. Arora OP. BCx returned as contaminant. D/c vancomycin. Started colchicine as per rheumatology as his renal function is now back to baseline. Rivera removed, but failed voiding trail. Discussed with urology and they rec keeping for now as he failed voiding trials, outpatient fu at urology clinic for voiding trials. LFTs worsening discussed with hepatology, and they rec repeat US with doppler, JAGDISH, Anti-smooth Ab, INR. His LFTs are now improving. Repeat Liver ultrasound with doppler neg for acute pathology. At this time he is medically stable for discharge to Rehab. Dc to rehab on prednisone taper and daily colchicine as rheum rec. Note his anti smooth muscle ab came back positive but was negligible. Confirmed with hepatology.      Principal Problem: Polyarticular gout    Other Problems Addressed:  Polyarticular Gout  Acute febrile episode   GPC bacteremia - contaminant   Acute kidney injury  "superimposed on chronic kidney disease - resolved  Transaminitis - improving   Urinary Retention  Non-traumatic rhabdomyolysis - improving   Generalized weakness  Left carpal tunnel syndrome  COPD, moderate  Obstructive sleep apnea on CPAP    Procedures Performed: * No surgery found *    Special Care, Treatment, and Services Provided: none    Consults: Rheum, orthopedics    Significant Diagnostic Studies:  No results found for: EF  Hemoglobin A1C   Date Value Ref Range Status   08/19/2019 5.6 4.0 - 5.6 % Final     Comment:     ADA Screening Guidelines:  5.7-6.4%  Consistent with prediabetes  >or=6.5%  Consistent with diabetes  High levels of fetal hemoglobin interfere with the HbA1C  assay. Heterozygous hemoglobin variants (HbS, HgC, etc)do  not significantly interfere with this assay.   However, presence of multiple variants may affect accuracy.     01/25/2019 5.6 4.0 - 5.6 % Final     Comment:     ADA Screening Guidelines:  5.7-6.4%  Consistent with prediabetes  >or=6.5%  Consistent with diabetes  High levels of fetal hemoglobin interfere with the HbA1C  assay. Heterozygous hemoglobin variants (HbS, HgC, etc)do  not significantly interfere with this assay.   However, presence of multiple variants may affect accuracy.         All diagnostics work up reviewd    Final Diagnoses: Same as principal problem.    Discharged Condition: stable  Face to face services were provided on 3/3/2020   Time Spent:  I spent > 30 minutes on the discharge, which included reviewing hospital course with patient/family, reviewing discharge medications, and arranging follow-up care.  Physical Exam on 3/3/2020:  BP (!) 149/80 (BP Location: Right arm, Patient Position: Lying)   Pulse 63   Temp 96.5 °F (35.8 °C) (Oral)   Resp 18   Ht 5' 9" (1.753 m)   Wt (!) 153 kg (337 lb 4.9 oz)   SpO2 96%   BMI 49.81 kg/m²     GEN: AA male  in no acute distress. Nontoxic. Resting in bed. Cooperative. Appears comfortable.  HEENT: NCAT. PERRL. EOMI. " Conjunctivae/corneas clear, sclera Anicteric.  CVS: RRR. Normal s1 s2 no murmur, click, rub or gallop  LUNG: CTAB. Normal respiratory effort. Anterior exam with no wheezes, rhonchi, or crackles.  ABD: Normoactive BS, soft, NT, ND, no masses or organomegaly.  EXT: Joint swelling noted on multiple UE joints improving. Left arm swollen compared to right improving. ROM continues to improve gradually.  SKIN: color, texture, turgor normal. No rashes or lesions  NEURO: Alert, oriented x 4, grossly normal       Disposition: Home or Self Care    Follow Up Instructions:   Follow-up Information     Azikiwe K Lombard, MD. Schedule an appointment as soon as possible for a visit in 2 weeks.    Specialty:  Family Medicine  Why:  Post hospital follow up  Contact information:  [address]  [place] [address]  [phone]             Go to Rheumatology.    Why:  Rheumatology will call and set up an apt with you, until then continue our colchicine                Future Appointments   Date Time Provider Department Center   3/5/2020 10:45 AM CARIDAD Che [facility] HAND [facility]   3/23/2020 10:15 AM LAB, [facility] [place]   3/30/2020  1:20 PM Azikiwe K. Lombard, MD [facility] MED [place]   4/17/2020  8:00 AM Jasiel Long MD [facility] RHEUM [facility]       Medications:    Discharge Medication List as of 3/2/2020  5:18 PM      START taking these medications    Details   insulin detemir U-100 (LEVEMIR FLEXTOUCH) 100 unit/mL (3 mL) SubQ InPn pen Inject 10 Units into the skin 2 (two) times daily., Starting Mon 3/2/2020, Until Tue 3/2/2021, No Print      lisinopril (PRINIVIL,ZESTRIL) 40 MG tablet Take 1 tablet (40 mg total) by mouth once daily., Starting Tue 3/3/2020, Until Wed 3/3/2021, Normal      oxyCODONE (ROXICODONE) 10 mg Tab immediate release tablet Take 1 tablet (10 mg total) by mouth every 6 (six) hours as needed., Starting Mon 3/2/2020, No Print      predniSONE (DELTASONE) 10 MG tablet Take 4 tablets (40 mg) by mouth daily  for 1 week, Normal      tamsulosin (FLOMAX) 0.4 mg Cap Take 1 capsule (0.4 mg total) by mouth once daily., Starting Tue 3/3/2020, Until Wed 3/3/2021, Normal         CONTINUE these medications which have CHANGED    Details   atorvastatin (LIPITOR) 40 MG tablet Take 1 tablet (40 mg total) by mouth once daily. Hold for 5 days then resume, Starting Sat 3/7/2020, Normal      COLCRYS 0.6 mg tablet Take 1 tablet (0.6 mg total) by mouth once daily. Daily until you see rheumatology, Starting Mon 3/2/2020, No Print         CONTINUE these medications which have NOT CHANGED    Details   ascorbic acid, vitamin C, (VITAMIN C) 500 MG tablet Take 500 mg by mouth once daily., Historical Med      aspirin (ECOTRIN) 81 MG EC tablet Take 81 mg by mouth once daily., Historical Med      diltiaZEM (CARTIA XT) 240 MG 24 hr capsule Take 1 capsule (240 mg total) by mouth once daily., Starting Fri 11/15/2019, Normal      gabapentin (NEURONTIN) 600 MG tablet Take 1 tablet (600 mg total) by mouth 3 (three) times daily., Starting Wed 3/27/2019, Normal      vitamin D (VITAMIN D3) 1000 units Tab Take 1,000 Units by mouth once daily., Historical Med      ADVAIR DISKUS 250-50 mcg/dose diskus inhaler Controller, Normal      albuterol (PROAIR HFA) 90 mcg/actuation inhaler Inhale 2 puffs into the lungs every 6 (six) hours as needed for Wheezing. Rescue, Starting Wed 1/15/2020, Normal      blood pressure test kit-large Kit 1 Device by Misc.(Non-Drug; Combo Route) route 2 (two) times daily., Starting Mon 8/28/2017, Normal      blood sugar diagnostic Strp To check BG 2 times daily, to use with insurance preferred meter, Print      blood-glucose meter kit To check BG 2 times daily, to use with insurance preferred meter, Print      diclofenac sodium (VOLTAREN) 1 % Gel APPLY  2 GRAMS  TOPICALLY TO AFFECTED AREA ONCE DAILY, Normal      lancets Misc To check BG 2 times daily, to use with insurance preferred meter, Print         STOP taking these medications        acetaminophen-codeine 300-30mg (TYLENOL #3) 300-30 mg Tab Comments:   Reason for Stopping:         lisinopril-hydrochlorothiazide (PRINZIDE,ZESTORETIC) 20-25 mg Tab Comments:   Reason for Stopping:         nitrofurantoin, macrocrystal-monohydrate, (MACROBID) 100 MG capsule Comments:   Reason for Stopping:         FLUAD 7721-3670, 65 YR UP,,PF, 45 mcg (15 mcg x 3)/0.5 mL Syrg Comments:   Reason for Stopping:         FLUZONE HIGH-DOSE 2017-18, PF, 180 mcg/0.5 mL vaccine Comments:   Reason for Stopping:         HYDROcodone-acetaminophen (NORCO) 5-325 mg per tablet Comments:   Reason for Stopping:         sulfamethoxazole-trimethoprim 800-160mg (BACTRIM DS) 800-160 mg Tab Comments:   Reason for Stopping:               Discharge Instructions:   Discussed in length with patient. If his presenting symptoms were to worsen, or if febrile, he should return to the ED. Patient voiced understanding. He needs to fu with PCP and Rheum with appropriate labs.        Discharge Procedure Orders   Ambulatory referral/consult to Urology   Standing Status: Future   Referral Priority: Urgent Referral Type: Consultation   Referral Reason: Specialty Services Required   Requested Specialty: Urology   Number of Visits Requested: 1         Guilherme Anderson MD  Department of Hospital Medicine

## 2020-03-05 ENCOUNTER — TELEPHONE (OUTPATIENT)
Dept: FAMILY MEDICINE | Facility: CLINIC | Age: 70
End: 2020-03-05

## 2020-03-05 ENCOUNTER — HOSPITAL ENCOUNTER (INPATIENT)
Facility: HOSPITAL | Age: 70
LOS: 5 days | Discharge: NURSING, REHAB OR LONG-TERM CARE | DRG: 699 | End: 2020-03-10
Attending: EMERGENCY MEDICINE | Admitting: INTERNAL MEDICINE
Payer: MEDICARE

## 2020-03-05 DIAGNOSIS — E66.01 MORBID OBESITY WITH BMI OF 45.0-49.9, ADULT: ICD-10-CM

## 2020-03-05 DIAGNOSIS — E87.5 HYPERKALEMIA: ICD-10-CM

## 2020-03-05 DIAGNOSIS — J44.9 COPD, MODERATE: ICD-10-CM

## 2020-03-05 DIAGNOSIS — Z74.09 DECREASED FUNCTIONAL MOBILITY AND ENDURANCE: ICD-10-CM

## 2020-03-05 DIAGNOSIS — R31.9 URINARY TRACT INFECTION WITH HEMATURIA, SITE UNSPECIFIED: Primary | ICD-10-CM

## 2020-03-05 DIAGNOSIS — R33.9 URINARY RETENTION: ICD-10-CM

## 2020-03-05 DIAGNOSIS — Z86.73 HISTORY OF CVA (CEREBROVASCULAR ACCIDENT): ICD-10-CM

## 2020-03-05 DIAGNOSIS — D72.829 LEUKOCYTOSIS, UNSPECIFIED TYPE: ICD-10-CM

## 2020-03-05 DIAGNOSIS — M10.09 ACUTE IDIOPATHIC GOUT OF MULTIPLE SITES: ICD-10-CM

## 2020-03-05 DIAGNOSIS — D72.829 ELEVATED WBC COUNT: ICD-10-CM

## 2020-03-05 DIAGNOSIS — E11.65 TYPE 2 DIABETES MELLITUS WITH HYPERGLYCEMIA, WITH LONG-TERM CURRENT USE OF INSULIN: ICD-10-CM

## 2020-03-05 DIAGNOSIS — N39.0 URINARY TRACT INFECTION WITH HEMATURIA, SITE UNSPECIFIED: Primary | ICD-10-CM

## 2020-03-05 DIAGNOSIS — A49.8 PSEUDOMONAS INFECTION: ICD-10-CM

## 2020-03-05 DIAGNOSIS — G47.33 OBSTRUCTIVE SLEEP APNEA ON CPAP: ICD-10-CM

## 2020-03-05 DIAGNOSIS — I10 ESSENTIAL HYPERTENSION: ICD-10-CM

## 2020-03-05 DIAGNOSIS — E87.1 HYPONATREMIA: ICD-10-CM

## 2020-03-05 DIAGNOSIS — Z79.4 TYPE 2 DIABETES MELLITUS WITH HYPERGLYCEMIA, WITH LONG-TERM CURRENT USE OF INSULIN: ICD-10-CM

## 2020-03-05 DIAGNOSIS — L89.300 PRESSURE INJURY OF BUTTOCK, UNSTAGEABLE, UNSPECIFIED LATERALITY: ICD-10-CM

## 2020-03-05 DIAGNOSIS — R60.9 EDEMA: ICD-10-CM

## 2020-03-05 LAB
ERYTHROCYTE [SEDIMENTATION RATE] IN BLOOD BY WESTERGREN METHOD: >120 MM/HR (ref 0–23)
LACTATE SERPL-SCNC: 1.5 MMOL/L (ref 0.5–2.2)

## 2020-03-05 PROCEDURE — 93005 ELECTROCARDIOGRAM TRACING: CPT

## 2020-03-05 PROCEDURE — 85060 PATHOLOGIST REVIEW: ICD-10-PCS | Mod: ,,, | Performed by: PATHOLOGY

## 2020-03-05 PROCEDURE — 93010 EKG 12-LEAD: ICD-10-PCS | Mod: ,,, | Performed by: INTERNAL MEDICINE

## 2020-03-05 PROCEDURE — 99232 PR SUBSEQUENT HOSPITAL CARE,LEVL II: ICD-10-PCS | Mod: ,,, | Performed by: PHYSICAL MEDICINE & REHABILITATION

## 2020-03-05 PROCEDURE — 99285 EMERGENCY DEPT VISIT HI MDM: CPT | Mod: 25

## 2020-03-05 PROCEDURE — 99284 EMERGENCY DEPT VISIT MOD MDM: CPT | Mod: ,,, | Performed by: EMERGENCY MEDICINE

## 2020-03-05 PROCEDURE — 83605 ASSAY OF LACTIC ACID: CPT

## 2020-03-05 PROCEDURE — 12000002 HC ACUTE/MED SURGE SEMI-PRIVATE ROOM

## 2020-03-05 PROCEDURE — 99232 SBSQ HOSP IP/OBS MODERATE 35: CPT | Mod: ,,, | Performed by: PHYSICAL MEDICINE & REHABILITATION

## 2020-03-05 PROCEDURE — 99284 PR EMERGENCY DEPT VISIT,LEVEL IV: ICD-10-PCS | Mod: ,,, | Performed by: EMERGENCY MEDICINE

## 2020-03-05 PROCEDURE — 80053 COMPREHEN METABOLIC PANEL: CPT

## 2020-03-05 PROCEDURE — 85652 RBC SED RATE AUTOMATED: CPT

## 2020-03-05 PROCEDURE — 85060 BLOOD SMEAR INTERPRETATION: CPT | Mod: ,,, | Performed by: PATHOLOGY

## 2020-03-05 PROCEDURE — 85007 BL SMEAR W/DIFF WBC COUNT: CPT

## 2020-03-05 PROCEDURE — 85027 COMPLETE CBC AUTOMATED: CPT

## 2020-03-05 PROCEDURE — 93010 ELECTROCARDIOGRAM REPORT: CPT | Mod: ,,, | Performed by: INTERNAL MEDICINE

## 2020-03-05 PROCEDURE — 82550 ASSAY OF CK (CPK): CPT

## 2020-03-05 PROCEDURE — 86140 C-REACTIVE PROTEIN: CPT

## 2020-03-05 PROCEDURE — 87040 BLOOD CULTURE FOR BACTERIA: CPT

## 2020-03-06 PROBLEM — L89.300 PRESSURE INJURY OF BUTTOCK, UNSTAGEABLE: Status: ACTIVE | Noted: 2020-03-06

## 2020-03-06 PROBLEM — D72.829 LEUKOCYTOSIS: Status: ACTIVE | Noted: 2020-03-06

## 2020-03-06 PROBLEM — Z79.4 TYPE 2 DIABETES MELLITUS WITH HYPERGLYCEMIA, WITH LONG-TERM CURRENT USE OF INSULIN: Status: ACTIVE | Noted: 2020-03-06

## 2020-03-06 PROBLEM — N39.0 URINARY TRACT INFECTION WITH HEMATURIA: Status: ACTIVE | Noted: 2020-03-06

## 2020-03-06 PROBLEM — E87.5 HYPERKALEMIA: Status: ACTIVE | Noted: 2020-03-06

## 2020-03-06 PROBLEM — E11.65 TYPE 2 DIABETES MELLITUS WITH HYPERGLYCEMIA, WITH LONG-TERM CURRENT USE OF INSULIN: Status: ACTIVE | Noted: 2020-03-06

## 2020-03-06 PROBLEM — R31.9 URINARY TRACT INFECTION WITH HEMATURIA: Status: ACTIVE | Noted: 2020-03-06

## 2020-03-06 PROBLEM — E87.1 HYPONATREMIA: Status: ACTIVE | Noted: 2020-03-06

## 2020-03-06 PROBLEM — M62.82 NON-TRAUMATIC RHABDOMYOLYSIS: Status: RESOLVED | Noted: 2020-02-27 | Resolved: 2020-03-06

## 2020-03-06 LAB
ALBUMIN SERPL BCP-MCNC: 2 G/DL (ref 3.5–5.2)
ALP SERPL-CCNC: 124 U/L (ref 55–135)
ALT SERPL W/O P-5'-P-CCNC: 167 U/L (ref 10–44)
AMORPH CRY UR QL COMP ASSIST: ABNORMAL
ANION GAP SERPL CALC-SCNC: 8 MMOL/L (ref 8–16)
ANION GAP SERPL CALC-SCNC: 9 MMOL/L (ref 8–16)
ANISOCYTOSIS BLD QL SMEAR: SLIGHT
ANISOCYTOSIS BLD QL SMEAR: SLIGHT
AST SERPL-CCNC: 41 U/L (ref 10–40)
BACTERIA #/AREA URNS AUTO: ABNORMAL /HPF
BACTERIA SPEC ANAEROBE CULT: NORMAL
BACTERIA SPEC ANAEROBE CULT: NORMAL
BASOPHILS # BLD AUTO: ABNORMAL K/UL (ref 0–0.2)
BASOPHILS NFR BLD: 0 % (ref 0–1.9)
BILIRUB SERPL-MCNC: 0.8 MG/DL (ref 0.1–1)
BILIRUB UR QL STRIP: NEGATIVE
BUN SERPL-MCNC: 48 MG/DL (ref 8–23)
BUN SERPL-MCNC: 62 MG/DL (ref 8–23)
CALCIUM SERPL-MCNC: 9.2 MG/DL (ref 8.7–10.5)
CALCIUM SERPL-MCNC: 9.2 MG/DL (ref 8.7–10.5)
CHLORIDE SERPL-SCNC: 100 MMOL/L (ref 95–110)
CHLORIDE SERPL-SCNC: 101 MMOL/L (ref 95–110)
CK SERPL-CCNC: 64 U/L (ref 20–200)
CLARITY UR REFRACT.AUTO: ABNORMAL
CO2 SERPL-SCNC: 21 MMOL/L (ref 23–29)
CO2 SERPL-SCNC: 22 MMOL/L (ref 23–29)
COLOR UR AUTO: YELLOW
CREAT SERPL-MCNC: 1.2 MG/DL (ref 0.5–1.4)
CREAT SERPL-MCNC: 1.6 MG/DL (ref 0.5–1.4)
CRP SERPL-MCNC: 293 MG/L (ref 0–8.2)
DIFFERENTIAL METHOD: ABNORMAL
DOHLE BOD BLD QL SMEAR: PRESENT
DOHLE BOD BLD QL SMEAR: PRESENT
EOSINOPHIL # BLD AUTO: ABNORMAL K/UL (ref 0–0.5)
EOSINOPHIL NFR BLD: 0 % (ref 0–8)
ERYTHROCYTE [DISTWIDTH] IN BLOOD BY AUTOMATED COUNT: 14.8 % (ref 11.5–14.5)
ERYTHROCYTE [DISTWIDTH] IN BLOOD BY AUTOMATED COUNT: 15 % (ref 11.5–14.5)
ERYTHROCYTE [DISTWIDTH] IN BLOOD BY AUTOMATED COUNT: 15.1 % (ref 11.5–14.5)
EST. GFR  (AFRICAN AMERICAN): 50.4 ML/MIN/1.73 M^2
EST. GFR  (AFRICAN AMERICAN): >60 ML/MIN/1.73 M^2
EST. GFR  (NON AFRICAN AMERICAN): 43.6 ML/MIN/1.73 M^2
EST. GFR  (NON AFRICAN AMERICAN): >60 ML/MIN/1.73 M^2
GLUCOSE SERPL-MCNC: 166 MG/DL (ref 70–110)
GLUCOSE SERPL-MCNC: 199 MG/DL (ref 70–110)
GLUCOSE UR QL STRIP: NEGATIVE
HCT VFR BLD AUTO: 32.4 % (ref 40–54)
HCT VFR BLD AUTO: 32.4 % (ref 40–54)
HCT VFR BLD AUTO: 33.5 % (ref 40–54)
HGB BLD-MCNC: 10.3 G/DL (ref 14–18)
HGB BLD-MCNC: 10.5 G/DL (ref 14–18)
HGB BLD-MCNC: 10.6 G/DL (ref 14–18)
HGB UR QL STRIP: ABNORMAL
HYALINE CASTS UR QL AUTO: 0 /LPF
HYPOCHROMIA BLD QL SMEAR: ABNORMAL
HYPOCHROMIA BLD QL SMEAR: ABNORMAL
IMM GRANULOCYTES # BLD AUTO: ABNORMAL K/UL (ref 0–0.04)
IMM GRANULOCYTES NFR BLD AUTO: ABNORMAL % (ref 0–0.5)
INFLUENZA A, MOLECULAR: NEGATIVE
INFLUENZA B, MOLECULAR: NEGATIVE
KETONES UR QL STRIP: NEGATIVE
LEUKOCYTE ESTERASE UR QL STRIP: ABNORMAL
LYMPHOCYTES # BLD AUTO: ABNORMAL K/UL (ref 1–4.8)
LYMPHOCYTES NFR BLD: 1 % (ref 18–48)
LYMPHOCYTES NFR BLD: 2 % (ref 18–48)
LYMPHOCYTES NFR BLD: 5 % (ref 18–48)
MAGNESIUM SERPL-MCNC: 1.9 MG/DL (ref 1.6–2.6)
MCH RBC QN AUTO: 27.8 PG (ref 27–31)
MCH RBC QN AUTO: 27.9 PG (ref 27–31)
MCH RBC QN AUTO: 28.5 PG (ref 27–31)
MCHC RBC AUTO-ENTMCNC: 31.6 G/DL (ref 32–36)
MCHC RBC AUTO-ENTMCNC: 31.8 G/DL (ref 32–36)
MCHC RBC AUTO-ENTMCNC: 32.4 G/DL (ref 32–36)
MCV RBC AUTO: 88 FL (ref 82–98)
METAMYELOCYTES NFR BLD MANUAL: 1 %
MICROSCOPIC COMMENT: ABNORMAL
MONOCYTES # BLD AUTO: ABNORMAL K/UL (ref 0.3–1)
MONOCYTES NFR BLD: 2 % (ref 4–15)
MONOCYTES NFR BLD: 3 % (ref 4–15)
MONOCYTES NFR BLD: 4.5 % (ref 4–15)
MYELOCYTES NFR BLD MANUAL: 1 %
MYELOCYTES NFR BLD MANUAL: 2 %
NEUTROPHILS NFR BLD: 90 % (ref 38–73)
NEUTROPHILS NFR BLD: 90 % (ref 38–73)
NEUTROPHILS NFR BLD: 90.5 % (ref 38–73)
NEUTS BAND NFR BLD MANUAL: 1 %
NEUTS BAND NFR BLD MANUAL: 3 %
NEUTS BAND NFR BLD MANUAL: 4 %
NITRITE UR QL STRIP: NEGATIVE
NRBC BLD-RTO: 0 /100 WBC
OSMOLALITY SERPL: 307 MOSM/KG (ref 280–300)
PATH REV BLD -IMP: NORMAL
PATH REV BLD -IMP: NORMAL
PH UR STRIP: 5 [PH] (ref 5–8)
PHOSPHATE SERPL-MCNC: 3.2 MG/DL (ref 2.7–4.5)
PLATELET # BLD AUTO: 357 K/UL (ref 150–350)
PLATELET # BLD AUTO: 364 K/UL (ref 150–350)
PLATELET # BLD AUTO: 408 K/UL (ref 150–350)
PLATELET BLD QL SMEAR: ABNORMAL
PMV BLD AUTO: 10.7 FL (ref 9.2–12.9)
PMV BLD AUTO: 10.9 FL (ref 9.2–12.9)
PMV BLD AUTO: 11.3 FL (ref 9.2–12.9)
POCT GLUCOSE: 110 MG/DL (ref 70–110)
POCT GLUCOSE: 179 MG/DL (ref 70–110)
POCT GLUCOSE: 221 MG/DL (ref 70–110)
POIKILOCYTOSIS BLD QL SMEAR: ABNORMAL
POLYCHROMASIA BLD QL SMEAR: ABNORMAL
POTASSIUM SERPL-SCNC: 5.2 MMOL/L (ref 3.5–5.1)
POTASSIUM SERPL-SCNC: 5.5 MMOL/L (ref 3.5–5.1)
PROT SERPL-MCNC: 7 G/DL (ref 6–8.4)
PROT UR QL STRIP: ABNORMAL
RBC # BLD AUTO: 3.68 M/UL (ref 4.6–6.2)
RBC # BLD AUTO: 3.7 M/UL (ref 4.6–6.2)
RBC # BLD AUTO: 3.8 M/UL (ref 4.6–6.2)
RBC #/AREA URNS AUTO: 4 /HPF (ref 0–4)
SODIUM SERPL-SCNC: 130 MMOL/L (ref 136–145)
SODIUM SERPL-SCNC: 131 MMOL/L (ref 136–145)
SP GR UR STRIP: 1.01 (ref 1–1.03)
SPECIMEN SOURCE: NORMAL
SQUAMOUS #/AREA URNS AUTO: 2 /HPF
TOXIC GRANULES BLD QL SMEAR: PRESENT
TOXIC GRANULES BLD QL SMEAR: PRESENT
URN SPEC COLLECT METH UR: ABNORMAL
WBC # BLD AUTO: 49.64 K/UL (ref 3.9–12.7)
WBC # BLD AUTO: 51.71 K/UL (ref 3.9–12.7)
WBC # BLD AUTO: 65.75 K/UL (ref 3.9–12.7)
WBC #/AREA URNS AUTO: 11 /HPF (ref 0–5)
WBC TOXIC VACUOLES BLD QL SMEAR: PRESENT

## 2020-03-06 PROCEDURE — 87086 URINE CULTURE/COLONY COUNT: CPT

## 2020-03-06 PROCEDURE — 63600175 PHARM REV CODE 636 W HCPCS: Performed by: PHYSICIAN ASSISTANT

## 2020-03-06 PROCEDURE — 81001 URINALYSIS AUTO W/SCOPE: CPT

## 2020-03-06 PROCEDURE — 99223 1ST HOSP IP/OBS HIGH 75: CPT | Mod: AI,GC,, | Performed by: HOSPITALIST

## 2020-03-06 PROCEDURE — 36415 COLL VENOUS BLD VENIPUNCTURE: CPT

## 2020-03-06 PROCEDURE — 87502 INFLUENZA DNA AMP PROBE: CPT

## 2020-03-06 PROCEDURE — 87186 SC STD MICRODIL/AGAR DIL: CPT

## 2020-03-06 PROCEDURE — 84100 ASSAY OF PHOSPHORUS: CPT

## 2020-03-06 PROCEDURE — 85027 COMPLETE CBC AUTOMATED: CPT | Mod: 91

## 2020-03-06 PROCEDURE — 83930 ASSAY OF BLOOD OSMOLALITY: CPT

## 2020-03-06 PROCEDURE — 63600175 PHARM REV CODE 636 W HCPCS: Performed by: INTERNAL MEDICINE

## 2020-03-06 PROCEDURE — 97161 PT EVAL LOW COMPLEX 20 MIN: CPT

## 2020-03-06 PROCEDURE — 85060 PATHOLOGIST REVIEW: ICD-10-PCS | Mod: ,,, | Performed by: PATHOLOGY

## 2020-03-06 PROCEDURE — 99900035 HC TECH TIME PER 15 MIN (STAT)

## 2020-03-06 PROCEDURE — 83735 ASSAY OF MAGNESIUM: CPT

## 2020-03-06 PROCEDURE — 99223 1ST HOSP IP/OBS HIGH 75: CPT | Mod: ,,, | Performed by: PHYSICIAN ASSISTANT

## 2020-03-06 PROCEDURE — 87088 URINE BACTERIA CULTURE: CPT

## 2020-03-06 PROCEDURE — 85007 BL SMEAR W/DIFF WBC COUNT: CPT | Mod: 91

## 2020-03-06 PROCEDURE — 97165 OT EVAL LOW COMPLEX 30 MIN: CPT

## 2020-03-06 PROCEDURE — 99223 PR INITIAL HOSPITAL CARE,LEVL III: ICD-10-PCS | Mod: ,,, | Performed by: PHYSICIAN ASSISTANT

## 2020-03-06 PROCEDURE — 85060 BLOOD SMEAR INTERPRETATION: CPT | Mod: ,,, | Performed by: PATHOLOGY

## 2020-03-06 PROCEDURE — 25000003 PHARM REV CODE 250: Performed by: PHYSICIAN ASSISTANT

## 2020-03-06 PROCEDURE — C9399 UNCLASSIFIED DRUGS OR BIOLOG: HCPCS | Performed by: PHYSICIAN ASSISTANT

## 2020-03-06 PROCEDURE — 94761 N-INVAS EAR/PLS OXIMETRY MLT: CPT

## 2020-03-06 PROCEDURE — 80048 BASIC METABOLIC PNL TOTAL CA: CPT

## 2020-03-06 PROCEDURE — 97116 GAIT TRAINING THERAPY: CPT

## 2020-03-06 PROCEDURE — 63600175 PHARM REV CODE 636 W HCPCS: Performed by: EMERGENCY MEDICINE

## 2020-03-06 PROCEDURE — 97535 SELF CARE MNGMENT TRAINING: CPT

## 2020-03-06 PROCEDURE — 94660 CPAP INITIATION&MGMT: CPT

## 2020-03-06 PROCEDURE — 11000001 HC ACUTE MED/SURG PRIVATE ROOM

## 2020-03-06 PROCEDURE — 87077 CULTURE AEROBIC IDENTIFY: CPT

## 2020-03-06 PROCEDURE — 99223 PR INITIAL HOSPITAL CARE,LEVL III: ICD-10-PCS | Mod: AI,GC,, | Performed by: HOSPITALIST

## 2020-03-06 PROCEDURE — 27000190 HC CPAP FULL FACE MASK W/VALVE

## 2020-03-06 RX ORDER — COLCHICINE 0.6 MG/1
0.6 TABLET ORAL DAILY
Status: DISCONTINUED | OUTPATIENT
Start: 2020-03-06 | End: 2020-03-06

## 2020-03-06 RX ORDER — POLYETHYLENE GLYCOL 3350 17 G/17G
17 POWDER, FOR SOLUTION ORAL DAILY
Status: DISCONTINUED | OUTPATIENT
Start: 2020-03-06 | End: 2020-03-09

## 2020-03-06 RX ORDER — SODIUM CHLORIDE 0.9 % (FLUSH) 0.9 %
5 SYRINGE (ML) INJECTION
Status: DISCONTINUED | OUTPATIENT
Start: 2020-03-06 | End: 2020-03-10 | Stop reason: HOSPADM

## 2020-03-06 RX ORDER — GABAPENTIN 300 MG/1
600 CAPSULE ORAL 3 TIMES DAILY PRN
Status: DISCONTINUED | OUTPATIENT
Start: 2020-03-06 | End: 2020-03-10 | Stop reason: HOSPADM

## 2020-03-06 RX ORDER — ONDANSETRON 8 MG/1
8 TABLET, ORALLY DISINTEGRATING ORAL EVERY 8 HOURS PRN
Status: DISCONTINUED | OUTPATIENT
Start: 2020-03-06 | End: 2020-03-10 | Stop reason: HOSPADM

## 2020-03-06 RX ORDER — IBUPROFEN 200 MG
24 TABLET ORAL
Status: DISCONTINUED | OUTPATIENT
Start: 2020-03-06 | End: 2020-03-10 | Stop reason: HOSPADM

## 2020-03-06 RX ORDER — PREDNISONE 10 MG/1
20 TABLET ORAL DAILY
Status: DISCONTINUED | OUTPATIENT
Start: 2020-03-07 | End: 2020-03-09

## 2020-03-06 RX ORDER — ASPIRIN 81 MG/1
81 TABLET ORAL DAILY
Status: DISCONTINUED | OUTPATIENT
Start: 2020-03-06 | End: 2020-03-10 | Stop reason: HOSPADM

## 2020-03-06 RX ORDER — CEFEPIME HYDROCHLORIDE 1 G/1
1 INJECTION, POWDER, FOR SOLUTION INTRAMUSCULAR; INTRAVENOUS
Status: DISCONTINUED | OUTPATIENT
Start: 2020-03-06 | End: 2020-03-08

## 2020-03-06 RX ORDER — CEFEPIME HYDROCHLORIDE 1 G/1
1 INJECTION, POWDER, FOR SOLUTION INTRAMUSCULAR; INTRAVENOUS
Status: COMPLETED | OUTPATIENT
Start: 2020-03-06 | End: 2020-03-06

## 2020-03-06 RX ORDER — TALC
6 POWDER (GRAM) TOPICAL NIGHTLY PRN
Status: DISCONTINUED | OUTPATIENT
Start: 2020-03-06 | End: 2020-03-10 | Stop reason: HOSPADM

## 2020-03-06 RX ORDER — GLUCAGON 1 MG
1 KIT INJECTION
Status: DISCONTINUED | OUTPATIENT
Start: 2020-03-06 | End: 2020-03-10 | Stop reason: HOSPADM

## 2020-03-06 RX ORDER — ATORVASTATIN CALCIUM 20 MG/1
40 TABLET, FILM COATED ORAL DAILY
Status: DISCONTINUED | OUTPATIENT
Start: 2020-03-06 | End: 2020-03-10 | Stop reason: HOSPADM

## 2020-03-06 RX ORDER — IBUPROFEN 200 MG
16 TABLET ORAL
Status: DISCONTINUED | OUTPATIENT
Start: 2020-03-06 | End: 2020-03-10 | Stop reason: HOSPADM

## 2020-03-06 RX ORDER — OXYCODONE HYDROCHLORIDE 5 MG/1
5 TABLET ORAL EVERY 6 HOURS PRN
Status: DISCONTINUED | OUTPATIENT
Start: 2020-03-06 | End: 2020-03-10 | Stop reason: HOSPADM

## 2020-03-06 RX ORDER — BISACODYL 10 MG
10 SUPPOSITORY, RECTAL RECTAL DAILY PRN
Status: DISCONTINUED | OUTPATIENT
Start: 2020-03-06 | End: 2020-03-10 | Stop reason: HOSPADM

## 2020-03-06 RX ORDER — CHOLECALCIFEROL (VITAMIN D3) 25 MCG
1000 TABLET ORAL DAILY
Status: DISCONTINUED | OUTPATIENT
Start: 2020-03-06 | End: 2020-03-10 | Stop reason: HOSPADM

## 2020-03-06 RX ORDER — ENOXAPARIN SODIUM 100 MG/ML
40 INJECTION SUBCUTANEOUS EVERY 12 HOURS
Status: DISCONTINUED | OUTPATIENT
Start: 2020-03-06 | End: 2020-03-10 | Stop reason: HOSPADM

## 2020-03-06 RX ORDER — ASCORBIC ACID 500 MG
500 TABLET ORAL DAILY
Status: DISCONTINUED | OUTPATIENT
Start: 2020-03-06 | End: 2020-03-10 | Stop reason: HOSPADM

## 2020-03-06 RX ORDER — IPRATROPIUM BROMIDE AND ALBUTEROL SULFATE 2.5; .5 MG/3ML; MG/3ML
3 SOLUTION RESPIRATORY (INHALATION) EVERY 4 HOURS PRN
Status: DISCONTINUED | OUTPATIENT
Start: 2020-03-06 | End: 2020-03-10 | Stop reason: HOSPADM

## 2020-03-06 RX ORDER — OXYCODONE HYDROCHLORIDE 10 MG/1
10 TABLET ORAL EVERY 6 HOURS PRN
Status: DISCONTINUED | OUTPATIENT
Start: 2020-03-06 | End: 2020-03-10 | Stop reason: HOSPADM

## 2020-03-06 RX ORDER — DILTIAZEM HYDROCHLORIDE 120 MG/1
240 CAPSULE, COATED, EXTENDED RELEASE ORAL DAILY
Status: DISCONTINUED | OUTPATIENT
Start: 2020-03-06 | End: 2020-03-10 | Stop reason: HOSPADM

## 2020-03-06 RX ORDER — PREDNISONE 20 MG/1
40 TABLET ORAL DAILY
Status: COMPLETED | OUTPATIENT
Start: 2020-03-06 | End: 2020-03-06

## 2020-03-06 RX ORDER — TAMSULOSIN HYDROCHLORIDE 0.4 MG/1
0.4 CAPSULE ORAL DAILY
Status: DISCONTINUED | OUTPATIENT
Start: 2020-03-06 | End: 2020-03-10 | Stop reason: HOSPADM

## 2020-03-06 RX ORDER — INSULIN ASPART 100 [IU]/ML
0-5 INJECTION, SOLUTION INTRAVENOUS; SUBCUTANEOUS
Status: DISCONTINUED | OUTPATIENT
Start: 2020-03-06 | End: 2020-03-10 | Stop reason: HOSPADM

## 2020-03-06 RX ORDER — ACETAMINOPHEN 325 MG/1
650 TABLET ORAL EVERY 4 HOURS PRN
Status: DISCONTINUED | OUTPATIENT
Start: 2020-03-06 | End: 2020-03-10 | Stop reason: HOSPADM

## 2020-03-06 RX ORDER — HYDRALAZINE HYDROCHLORIDE 25 MG/1
25 TABLET, FILM COATED ORAL EVERY 8 HOURS PRN
Status: DISCONTINUED | OUTPATIENT
Start: 2020-03-06 | End: 2020-03-10 | Stop reason: HOSPADM

## 2020-03-06 RX ORDER — SODIUM CHLORIDE 0.9 % (FLUSH) 0.9 %
10 SYRINGE (ML) INJECTION
Status: DISCONTINUED | OUTPATIENT
Start: 2020-03-06 | End: 2020-03-10 | Stop reason: HOSPADM

## 2020-03-06 RX ORDER — HEPARIN SODIUM 5000 [USP'U]/ML
5000 INJECTION, SOLUTION INTRAVENOUS; SUBCUTANEOUS EVERY 8 HOURS
Status: DISCONTINUED | OUTPATIENT
Start: 2020-03-06 | End: 2020-03-06

## 2020-03-06 RX ORDER — PREDNISONE 20 MG/1
20 TABLET ORAL DAILY
Status: DISCONTINUED | OUTPATIENT
Start: 2020-03-14 | End: 2020-03-06

## 2020-03-06 RX ADMIN — ATORVASTATIN CALCIUM 40 MG: 20 TABLET, FILM COATED ORAL at 08:03

## 2020-03-06 RX ADMIN — CEFEPIME 1 G: 1 INJECTION, POWDER, FOR SOLUTION INTRAMUSCULAR; INTRAVENOUS at 08:03

## 2020-03-06 RX ADMIN — SODIUM CHLORIDE 1500 ML: 0.9 INJECTION, SOLUTION INTRAVENOUS at 03:03

## 2020-03-06 RX ADMIN — OXYCODONE HYDROCHLORIDE 10 MG: 10 TABLET ORAL at 12:03

## 2020-03-06 RX ADMIN — CEFEPIME 1 G: 1 INJECTION, POWDER, FOR SOLUTION INTRAMUSCULAR; INTRAVENOUS at 05:03

## 2020-03-06 RX ADMIN — PREDNISONE 40 MG: 20 TABLET ORAL at 08:03

## 2020-03-06 RX ADMIN — CEFEPIME 1 G: 1 INJECTION, POWDER, FOR SOLUTION INTRAMUSCULAR; INTRAVENOUS at 01:03

## 2020-03-06 RX ADMIN — ENOXAPARIN SODIUM 40 MG: 100 INJECTION SUBCUTANEOUS at 09:03

## 2020-03-06 RX ADMIN — Medication 500 MG: at 08:03

## 2020-03-06 RX ADMIN — VITAMIN D, TAB 1000IU (100/BT) 1000 UNITS: 25 TAB at 08:03

## 2020-03-06 RX ADMIN — SODIUM CHLORIDE 1000 ML: 0.9 INJECTION, SOLUTION INTRAVENOUS at 12:03

## 2020-03-06 RX ADMIN — TAMSULOSIN HYDROCHLORIDE 0.4 MG: 0.4 CAPSULE ORAL at 08:03

## 2020-03-06 RX ADMIN — ASPIRIN 81 MG: 81 TABLET, COATED ORAL at 08:03

## 2020-03-06 RX ADMIN — INSULIN ASPART 2 UNITS: 100 INJECTION, SOLUTION INTRAVENOUS; SUBCUTANEOUS at 12:03

## 2020-03-06 RX ADMIN — INSULIN DETEMIR 10 UNITS: 100 INJECTION, SOLUTION SUBCUTANEOUS at 09:03

## 2020-03-06 RX ADMIN — DILTIAZEM HYDROCHLORIDE 240 MG: 120 CAPSULE, COATED, EXTENDED RELEASE ORAL at 08:03

## 2020-03-06 RX ADMIN — INSULIN DETEMIR 10 UNITS: 100 INJECTION, SOLUTION SUBCUTANEOUS at 08:03

## 2020-03-06 RX ADMIN — VANCOMYCIN HYDROCHLORIDE 1500 MG: 1.5 INJECTION, POWDER, LYOPHILIZED, FOR SOLUTION INTRAVENOUS at 08:03

## 2020-03-06 NOTE — ASSESSMENT & PLAN NOTE
- baseline SCr 1.2, SCr on admit 1.6  - IVFs ON  - hold lisinopril and colchicine   - I/Os  - treat UTI  - check renal US given UTI

## 2020-03-06 NOTE — PLAN OF CARE
CM met with patient in room for Dishcarge Planning Assessment.  Patient was able to answer questions.  Per patient he  lives alone in a house with two step(s) to enter.   Per patient he was independent with ADLS and ambulation.  Patient will have assistance from children upon discharge.   All questions addressed.  CM will follow for needs.       03/06/20 1103   Discharge Assessment   Assessment Type Discharge Planning Assessment   Confirmed/corrected address and phone number on facesheet? Yes   Assessment information obtained from? Patient   Expected Length of Stay (days) 4   Communicated expected length of stay with patient/caregiver yes   Prior to hospitilization cognitive status: Alert/Oriented   Prior to hospitalization functional status: Independent   Current cognitive status: Alert/Oriented   Current Functional Status: Needs Assistance   Lives With alone   Able to Return to Prior Arrangements yes   Is patient able to care for self after discharge? Yes   Patient's perception of discharge disposition home or selfcare   Readmission Within the Last 30 Days current reason for admission unrelated to previous admission   If yes, most recent facility name: [facility]   Patient currently being followed by outpatient case management? No   Patient currently receives any other outside agency services? No   Equipment Currently Used at Home none   Do you have any problems affording any of your prescribed medications? No   Is the patient taking medications as prescribed? yes   Does the patient have transportation home? Yes   Transportation Anticipated family or friend will provide   Does the patient receive services at the Coumadin Clinic? No   Discharge Plan A Home   Discharge Plan B Home Health   DME Needed Upon Discharge    (TBD)   Patient/Family in Agreement with Plan yes     Azikiwe K Lombard, MD       [facility] [address] [place] [address] [address]  [address] [place] [address]  Phone: [phone] Fax:  [phone]    [id number] - [place] - [address] [facility] [address] [facility]  [address]  [place] [address]  Phone: [phone] Fax: [phone]    [facility] - [place] - [address]  [address]  [place] [address]  Phone: [phone] Fax: [phone]    [facility]  [address]  [place] [address]  Phone: [phone] Fax: [phone]    [facility]  [address]  [facility] [address]  Phone: [phone] Fax: [phone]    Payor: [email or web address] MANAGED MEDICARE / Plan: [employer] CHOICES 65 / Product Type: Medicare Advantage /

## 2020-03-06 NOTE — ED NOTES
Pt arrives from LTAC for evaluation of elevated WBC. Pt denies acute pain, fever, chills, body aches, chest pain. Pt reports new bryant placement on Sunday, pt initially had bryant catheter placed 2/2 to urinary retention. Pt reports recent carpal tunnel release surgery to left wrist, sutures in place, no discharge, redness, or swelling noticed. Pt reports chronic swelling to bilateral hands and reports increased swelling since surgery. Pt chronically bed bound.

## 2020-03-06 NOTE — SUBJECTIVE & OBJECTIVE
Past Medical History:   Diagnosis Date    Anticoagulant long-term use     Basal ganglia hemorrhage     Left basal ganglia hemorrhage with resultant right-sided hemiparesis which has resolved.     Benign hypertension with CKD (chronic kidney disease) stage III      Cataract     Chronic idiopathic gout of multiple sites     Chronic kidney disease, stage 3     COPD (chronic obstructive pulmonary disease)     Erectile dysfunction     Gout     Hemorrhoids without complication     Hyperlipidemia     Morbid obesity     Obstructive sleep apnea on CPAP     Stroke 2016, 2006    Thalamic infarct, acute (right) 1/2016    Type 2 DM with CKD stage 3 and hypertension     On pravastatin for cardiovascular protection.        Past Surgical History:   Procedure Laterality Date    CARPAL TUNNEL RELEASE Left 2/19/2020    Procedure: RELEASE, CARPAL TUNNEL LEFT;  Surgeon: Maria Luisa Mccurdy MD;  Location: [facility] OR;  Service: Orthopedics;  Laterality: Left;    EPIDURAL STEROID INJECTION N/A 5/2/2019    Procedure: Injection, Steroid, Epidural Cervical;  Surgeon: Dariel Doan Jr., MD;  Location: [facility] ENDO;  Service: Pain Management;  Laterality: N/A;  Cervical Epidural Steroid Injection     [id number]    Arrive @ 1130; ASA; Check BG    EPIDURAL STEROID INJECTION Bilateral 5/29/2019    Procedure: Lumbar Medial Branch Blocks;  Surgeon: Dariel Doan Jr., MD;  Location: [facility] ENDO;  Service: Pain Management;  Laterality: Bilateral;  Bilateral Lumbar Medial Branch Blocks L3, L4, L5    99499  07197    Attive @ 1030 (11 arrival request); NO Sedation; ASA; Check BG    EPIDURAL STEROID INJECTION Bilateral 7/3/2019    Procedure: Lumbar Medial Branch Blocks;  Surgeon: Dariel Doan Jr., MD;  Location: [facility] ENDO;  Service: Pain Management;  Laterality: Bilateral;  Bilateral Lumbar Medial Branch Blocks L3, L4, L5    10983  [id number]    Arrive @ 0930; NO Sedation; ASA; Check BG    EPIDURAL STEROID INJECTION N/A 8/7/2019     Procedure: Injection, Steroid, Epidural Cervical;  Surgeon: Dariel Doan Jr., MD;  Location: [facility];  Service: Pain Management;  Laterality: N/A;  Cervical Epidural Steroid Injection C7-T1    98574    Arrive @ 1115; Last ASA 7/30; Check BG    NO PAST SURGERIES         Review of patient's allergies indicates:   Allergen Reactions    Tomato (solanum lycopersicum) Hives    Naproxen Hives    Shrimp Other (See Comments)       Current Facility-Administered Medications on File Prior to Encounter   Medication    0.9%  NaCl infusion     Current Outpatient Medications on File Prior to Encounter   Medication Sig    [START ON 3/7/2020] atorvastatin (LIPITOR) 40 MG tablet Take 1 tablet (40 mg total) by mouth once daily. Hold for 5 days then resume    diltiaZEM (CARTIA XT) 240 MG 24 hr capsule Take 1 capsule (240 mg total) by mouth once daily.    gabapentin (NEURONTIN) 600 MG tablet Take 1 tablet (600 mg total) by mouth 3 (three) times daily.    insulin detemir U-100 (LEVEMIR FLEXTOUCH) 100 unit/mL (3 mL) SubQ InPn pen Inject 10 Units into the skin 2 (two) times daily.    lisinopril (PRINIVIL,ZESTRIL) 40 MG tablet Take 1 tablet (40 mg total) by mouth once daily.    oxyCODONE (ROXICODONE) 10 mg Tab immediate release tablet Take 1 tablet (10 mg total) by mouth every 6 (six) hours as needed.    predniSONE (DELTASONE) 10 MG tablet Take 4 tablets (40 mg) by mouth daily for 1 week    tamsulosin (FLOMAX) 0.4 mg Cap Take 1 capsule (0.4 mg total) by mouth once daily.    ADVAIR DISKUS 250-50 mcg/dose diskus inhaler Controller    albuterol (PROAIR HFA) 90 mcg/actuation inhaler Inhale 2 puffs into the lungs every 6 (six) hours as needed for Wheezing. Rescue    ascorbic acid, vitamin C, (VITAMIN C) 500 MG tablet Take 500 mg by mouth once daily.    aspirin (ECOTRIN) 81 MG EC tablet Take 81 mg by mouth once daily.    blood pressure test kit-large Kit 1 Device by Misc.(Non-Drug; Combo Route) route 2 (two) times daily.     blood sugar diagnostic Strp To check BG 2 times daily, to use with insurance preferred meter    blood-glucose meter kit To check BG 2 times daily, to use with insurance preferred meter    COLCRYS 0.6 mg tablet Take 1 tablet (0.6 mg total) by mouth once daily. Daily until you see rheumatology    diclofenac sodium (VOLTAREN) 1 % Gel APPLY  2 GRAMS  TOPICALLY TO AFFECTED AREA ONCE DAILY    lancets Misc To check BG 2 times daily, to use with insurance preferred meter    vitamin D (VITAMIN D3) 1000 units Tab Take 1,000 Units by mouth once daily.    [DISCONTINUED] nystatin (MYCOSTATIN) cream APPLY   TOPICALLY TO AFFECTED AREA TWICE DAILY     Family History     Problem Relation (Age of Onset)    Diabetes Paternal Grandfather    Heart disease Paternal Grandfather    Hypertension     No Known Problems Mother, Father, Sister, Brother, Maternal Aunt, Maternal Uncle, Paternal Aunt, Paternal Uncle, Maternal Grandmother, Maternal Grandfather, Paternal Grandmother        Tobacco Use    Smoking status: Never Smoker    Smokeless tobacco: Never Used   Substance and Sexual Activity    Alcohol use: Yes     Alcohol/week: 0.0 standard drinks     Comment: occacionally    Drug use: No    Sexual activity: Not Currently     Review of Systems   Constitutional: Negative for appetite change, chills, fatigue and fever.   HENT: Positive for voice change. Negative for congestion, postnasal drip, rhinorrhea, sinus pain, sore throat and trouble swallowing.    Respiratory: Negative for cough and wheezing.    Cardiovascular: Positive for leg swelling (improving). Negative for chest pain and palpitations.   Gastrointestinal: Negative for abdominal distention, constipation, diarrhea, nausea and vomiting.   Genitourinary: Positive for difficulty urinating. Negative for dysuria, flank pain and hematuria.   Musculoskeletal: Positive for arthralgias, back pain, gait problem and myalgias.   Skin: Positive for wound. Negative for rash.    Allergic/Immunologic: Negative for immunocompromised state.   Neurological: Positive for weakness. Negative for dizziness and headaches.   Hematological: Does not bruise/bleed easily.   Psychiatric/Behavioral: Negative for confusion and hallucinations.     Objective:     Vital Signs (Most Recent):  Temp: 98.5 °F (36.9 °C) (03/05/20 2201)  Pulse: 81 (03/06/20 0030)  Resp: 20 (03/05/20 2201)  BP: (!) 139/59 (03/06/20 0030)  SpO2: 97 % (03/06/20 0030) Vital Signs (24h Range):  Temp:  [98.5 °F (36.9 °C)] 98.5 °F (36.9 °C)  Pulse:  [81-88] 81  Resp:  [20] 20  SpO2:  [97 %-99 %] 97 %  BP: (125-139)/(59-86) 139/59        There is no height or weight on file to calculate BMI.    Physical Exam   Constitutional: He is oriented to person, place, and time. He appears well-developed and well-nourished. No distress.   obese   HENT:   Head: Normocephalic and atraumatic.   Eyes: Pupils are equal, round, and reactive to light. EOM are normal. No scleral icterus.   Neck: Normal range of motion. Neck supple. No JVD present.   Cardiovascular: Normal rate and regular rhythm.   No murmur heard.  Pulmonary/Chest: Effort normal. No respiratory distress. He has no wheezes. He has no rales.   Abdominal: Soft. He exhibits no distension. There is no tenderness. There is no guarding.   Musculoskeletal: He exhibits edema (trace).   Generalized weakness   Neurological: He is alert and oriented to person, place, and time. No sensory deficit. He exhibits normal muscle tone.   Skin: Skin is warm and dry. Capillary refill takes less than 2 seconds. He is not diaphoretic.   Wound to gluteal cleft, difficult to visualized 2/2 habitus  Well healed incision to L wrist   Psychiatric: He has a normal mood and affect. His behavior is normal. Thought content normal.   Nursing note and vitals reviewed.        CRANIAL NERVES     CN III, IV, VI   Pupils are equal, round, and reactive to light.  Extraocular motions are normal.        Significant Labs:   CBC:    Recent Labs   Lab 03/05/20  2245   WBC 65.75*   HGB 10.5*   HCT 32.4*   *     CMP:   Recent Labs   Lab 03/05/20  2245   *   K 5.2*      CO2 22*   *   BUN 62*   CREATININE 1.6*   CALCIUM 9.2   PROT 7.0   ALBUMIN 2.0*   BILITOT 0.8   ALKPHOS 124   AST 41*   *   ANIONGAP 9   EGFRNONAA 43.6*     Lactic Acid:   Recent Labs   Lab 03/05/20  2245   LACTATE 1.5     Lipase: No results for input(s): LIPASE in the last 48 hours.  TSH:   Recent Labs   Lab 02/27/20  0441   TSH 0.445     Urine Culture: No results for input(s): LABURIN in the last 48 hours.  Urine Studies:   Recent Labs   Lab 03/06/20  0007   COLORU Yellow   APPEARANCEUA Cloudy*   PHUR 5.0   SPECGRAV 1.015   PROTEINUA 1+*   GLUCUA Negative   KETONESU Negative   BILIRUBINUA Negative   OCCULTUA 2+*   NITRITE Negative   LEUKOCYTESUR 2+*   RBCUA 4   WBCUA 11*   BACTERIA Few*   SQUAMEPITHEL 2   HYALINECASTS 0       Significant Imaging: I have reviewed all pertinent imaging results/findings within the past 24 hours.

## 2020-03-06 NOTE — H&P
[facility] Medicine  History & Physical    Patient Name: Bj Pate Jr.  MRN: [id number]  Admission Date: 3/5/2020  Attending Physician: Guilherme Anderson MD   Primary Care Provider: Azikiwe K Lombard, MD    [facility] Medicine Team: [facility] Humberto Morejon PA-C     Patient information was obtained from patient, past medical records and ER records.     Subjective:     Principal Problem:Leukocytosis    Chief Complaint:   Chief Complaint   Patient presents with    Abnormal Lab     Tx from [facility] for elevated WBC of 61 today. Recent sx of left carpal tunnel sx. Bed bound pmhx of COPD 91% on RA, stroke no neuro deficits.         HPI: Bj Pate Jr. is a 70M with CVA w/o residual deficit, COPD, HILARIO on CPAP, Gout, DDD of Lumbar, Cervical Radiculopathy, HTN, urinary retention with chronic bryant, and recent admission for polyarticular gout. He was discharged on steroids and colchicine to rehab. He had positive blood cultures from that admission that were contaminate, he was not discharged on antibiotics. LFTs transiently worsened during admission, suspected from rhabdo. Note his anti smooth muscle ab came back positive but was negligible.     He presents today from rehab for worsening leukocytosis. On discharge 03/02, patient had WBC 14, today 65. He was discharged on prednisone 40 mg daily, but received 80 mg daily while admitted. His highest WBC count during that admission was 22. He denies fever at rehab and only complains of laryngitis symptoms and wound on his buttock. He denies URI symptoms, cough, dysuria, flank pain, abdominal pain, NVD, cancer history, night sweats, weight loss. He continues to progress slowly at rehab.    ED: AFVSS, WBC 65, LA 1.5,  from 434, LFTs mildly elevated but improving, SCr 1.6 from 1.2, UA +, bryant exchanged, CXR clear, CPK 64    Past Medical History:   Diagnosis Date    Anticoagulant long-term use     Basal ganglia hemorrhage     Left  basal ganglia hemorrhage with resultant right-sided hemiparesis which has resolved.     Benign hypertension with CKD (chronic kidney disease) stage III      Cataract     Chronic idiopathic gout of multiple sites     Chronic kidney disease, stage 3     COPD (chronic obstructive pulmonary disease)     Erectile dysfunction     Gout     Hemorrhoids without complication     Hyperlipidemia     Morbid obesity     Obstructive sleep apnea on CPAP     Stroke 2016, 2006    Thalamic infarct, acute (right) 1/2016    Type 2 DM with CKD stage 3 and hypertension     On pravastatin for cardiovascular protection.        Past Surgical History:   Procedure Laterality Date    CARPAL TUNNEL RELEASE Left 2/19/2020    Procedure: RELEASE, CARPAL TUNNEL LEFT;  Surgeon: Maria Luisa Mccurdy MD;  Location: [facility] OR;  Service: Orthopedics;  Laterality: Left;    EPIDURAL STEROID INJECTION N/A 5/2/2019    Procedure: Injection, Steroid, Epidural Cervical;  Surgeon: Dariel Doan Jr., MD;  Location: [facility] ENDO;  Service: Pain Management;  Laterality: N/A;  Cervical Epidural Steroid Injection     03152    Arrive @ 1130; ASA; Check BG    EPIDURAL STEROID INJECTION Bilateral 5/29/2019    Procedure: Lumbar Medial Branch Blocks;  Surgeon: Dariel Doan Jr., MD;  Location: [facility] ENDO;  Service: Pain Management;  Laterality: Bilateral;  Bilateral Lumbar Medial Branch Blocks L3, L4, L5    [id number]  [id number]    Attive @ 1030 (11 arrival request); NO Sedation; ASA; Check BG    EPIDURAL STEROID INJECTION Bilateral 7/3/2019    Procedure: Lumbar Medial Branch Blocks;  Surgeon: Dariel Doan Jr., MD;  Location: [facility] ENDO;  Service: Pain Management;  Laterality: Bilateral;  Bilateral Lumbar Medial Branch Blocks L3, L4, L5    52811  [id number]    Arrive @ 0930; NO Sedation; ASA; Check BG    EPIDURAL STEROID INJECTION N/A 8/7/2019    Procedure: Injection, Steroid, Epidural Cervical;  Surgeon: Dariel Doan Jr., MD;  Location: [facility]  ENDO;  Service: Pain Management;  Laterality: N/A;  Cervical Epidural Steroid Injection C7-T1    29168    Arrive @ 1115; Last ASA 7/30; Check BG    NO PAST SURGERIES         Review of patient's allergies indicates:   Allergen Reactions    Tomato (solanum lycopersicum) Hives    Naproxen Hives    Shrimp Other (See Comments)       Current Facility-Administered Medications on File Prior to Encounter   Medication    0.9%  NaCl infusion     Current Outpatient Medications on File Prior to Encounter   Medication Sig    [START ON 3/7/2020] atorvastatin (LIPITOR) 40 MG tablet Take 1 tablet (40 mg total) by mouth once daily. Hold for 5 days then resume    diltiaZEM (CARTIA XT) 240 MG 24 hr capsule Take 1 capsule (240 mg total) by mouth once daily.    gabapentin (NEURONTIN) 600 MG tablet Take 1 tablet (600 mg total) by mouth 3 (three) times daily.    insulin detemir U-100 (LEVEMIR FLEXTOUCH) 100 unit/mL (3 mL) SubQ InPn pen Inject 10 Units into the skin 2 (two) times daily.    lisinopril (PRINIVIL,ZESTRIL) 40 MG tablet Take 1 tablet (40 mg total) by mouth once daily.    oxyCODONE (ROXICODONE) 10 mg Tab immediate release tablet Take 1 tablet (10 mg total) by mouth every 6 (six) hours as needed.    predniSONE (DELTASONE) 10 MG tablet Take 4 tablets (40 mg) by mouth daily for 1 week    tamsulosin (FLOMAX) 0.4 mg Cap Take 1 capsule (0.4 mg total) by mouth once daily.    ADVAIR DISKUS 250-50 mcg/dose diskus inhaler Controller    albuterol (PROAIR HFA) 90 mcg/actuation inhaler Inhale 2 puffs into the lungs every 6 (six) hours as needed for Wheezing. Rescue    ascorbic acid, vitamin C, (VITAMIN C) 500 MG tablet Take 500 mg by mouth once daily.    aspirin (ECOTRIN) 81 MG EC tablet Take 81 mg by mouth once daily.    blood pressure test kit-large Kit 1 Device by Misc.(Non-Drug; Combo Route) route 2 (two) times daily.    blood sugar diagnostic Strp To check BG 2 times daily, to use with insurance preferred meter     blood-glucose meter kit To check BG 2 times daily, to use with insurance preferred meter    COLCRYS 0.6 mg tablet Take 1 tablet (0.6 mg total) by mouth once daily. Daily until you see rheumatology    diclofenac sodium (VOLTAREN) 1 % Gel APPLY  2 GRAMS  TOPICALLY TO AFFECTED AREA ONCE DAILY    lancets Misc To check BG 2 times daily, to use with insurance preferred meter    vitamin D (VITAMIN D3) 1000 units Tab Take 1,000 Units by mouth once daily.    [DISCONTINUED] nystatin (MYCOSTATIN) cream APPLY   TOPICALLY TO AFFECTED AREA TWICE DAILY     Family History     Problem Relation (Age of Onset)    Diabetes Paternal Grandfather    Heart disease Paternal Grandfather    Hypertension     No Known Problems Mother, Father, Sister, Brother, Maternal Aunt, Maternal Uncle, Paternal Aunt, Paternal Uncle, Maternal Grandmother, Maternal Grandfather, Paternal Grandmother        Tobacco Use    Smoking status: Never Smoker    Smokeless tobacco: Never Used   Substance and Sexual Activity    Alcohol use: Yes     Alcohol/week: 0.0 standard drinks     Comment: occacionally    Drug use: No    Sexual activity: Not Currently     Review of Systems   Constitutional: Negative for appetite change, chills, fatigue and fever.   HENT: Positive for voice change. Negative for congestion, postnasal drip, rhinorrhea, sinus pain, sore throat and trouble swallowing.    Respiratory: Negative for cough and wheezing.    Cardiovascular: Positive for leg swelling (improving). Negative for chest pain and palpitations.   Gastrointestinal: Negative for abdominal distention, constipation, diarrhea, nausea and vomiting.   Genitourinary: Positive for difficulty urinating. Negative for dysuria, flank pain and hematuria.   Musculoskeletal: Positive for arthralgias, back pain, gait problem and myalgias.   Skin: Positive for wound. Negative for rash.   Allergic/Immunologic: Negative for immunocompromised state.   Neurological: Positive for weakness. Negative  for dizziness and headaches.   Hematological: Does not bruise/bleed easily.   Psychiatric/Behavioral: Negative for confusion and hallucinations.     Objective:     Vital Signs (Most Recent):  Temp: 98.5 °F (36.9 °C) (03/05/20 2201)  Pulse: 81 (03/06/20 0030)  Resp: 20 (03/05/20 2201)  BP: (!) 139/59 (03/06/20 0030)  SpO2: 97 % (03/06/20 0030) Vital Signs (24h Range):  Temp:  [98.5 °F (36.9 °C)] 98.5 °F (36.9 °C)  Pulse:  [81-88] 81  Resp:  [20] 20  SpO2:  [97 %-99 %] 97 %  BP: (125-139)/(59-86) 139/59        There is no height or weight on file to calculate BMI.    Physical Exam   Constitutional: He is oriented to person, place, and time. He appears well-developed and well-nourished. No distress.   obese   HENT:   Head: Normocephalic and atraumatic.   Eyes: Pupils are equal, round, and reactive to light. EOM are normal. No scleral icterus.   Neck: Normal range of motion. Neck supple. No JVD present.   Cardiovascular: Normal rate and regular rhythm.   No murmur heard.  Pulmonary/Chest: Effort normal. No respiratory distress. He has no wheezes. He has no rales.   Abdominal: Soft. He exhibits no distension. There is no tenderness. There is no guarding.   Musculoskeletal: He exhibits edema (trace).   Generalized weakness   Neurological: He is alert and oriented to person, place, and time. No sensory deficit. He exhibits normal muscle tone.   Skin: Skin is warm and dry. Capillary refill takes less than 2 seconds. He is not diaphoretic.   Wound to gluteal cleft, difficult to visualized 2/2 habitus  Well healed incision to L wrist   Psychiatric: He has a normal mood and affect. His behavior is normal. Thought content normal.   Nursing note and vitals reviewed.        CRANIAL NERVES     CN III, IV, VI   Pupils are equal, round, and reactive to light.  Extraocular motions are normal.        Significant Labs:   CBC:   Recent Labs   Lab 03/05/20  2245   WBC 65.75*   HGB 10.5*   HCT 32.4*   *     CMP:   Recent Labs   Lab  03/05/20  2245   *   K 5.2*      CO2 22*   *   BUN 62*   CREATININE 1.6*   CALCIUM 9.2   PROT 7.0   ALBUMIN 2.0*   BILITOT 0.8   ALKPHOS 124   AST 41*   *   ANIONGAP 9   EGFRNONAA 43.6*     Lactic Acid:   Recent Labs   Lab 03/05/20  2245   LACTATE 1.5     Lipase: No results for input(s): LIPASE in the last 48 hours.  TSH:   Recent Labs   Lab 02/27/20  0441   TSH 0.445     Urine Culture: No results for input(s): LABURIN in the last 48 hours.  Urine Studies:   Recent Labs   Lab 03/06/20  0007   COLORU Yellow   APPEARANCEUA Cloudy*   PHUR 5.0   SPECGRAV 1.015   PROTEINUA 1+*   GLUCUA Negative   KETONESU Negative   BILIRUBINUA Negative   OCCULTUA 2+*   NITRITE Negative   LEUKOCYTESUR 2+*   RBCUA 4   WBCUA 11*   BACTERIA Few*   SQUAMEPITHEL 2   HYALINECASTS 0       Significant Imaging: I have reviewed all pertinent imaging results/findings within the past 24 hours.    Assessment/Plan:     * Leukocytosis  - higher than anticipated elevation in WBC despite tapering steroids for gout. During prior admission, patient's highest WBC (while on higher doses of steroids) was 22.  - Patient now with UTI and developing/new gluteal pressure ulcer  - treat UTI as below  - add on vancomycin for gluteal cleft wound  - continue steroids  - +/- heme/onc consult w/o improvement  - check flu given laryngitis  - follow BCx  - CRP improving 434>293    Urinary tract infection with hematuria  - start cefepime given prolonged recent hospitalization and extreme elevation in WBC (start date 03/06)  - follow UCX  - 1/4 SIRS for leukocytosis, LA 1.5    Pressure injury of buttock, unstageable  - wound care and turn q2H  - starting vancomycin but difficult to assess if wound infected on admission 2/2 habitus    Acute kidney injury superimposed on chronic kidney disease  - baseline SCr 1.2, SCr on admit 1.6  - IVFs ON  - hold lisinopril and colchicine   - I/Os  - treat UTI  - check renal US given UTI    Hyponatremia  -  difficult to assess volume status  - IVFs and trend  - check serum osmolarity    Hyperkalemia  - IVFs    Type 2 diabetes mellitus with hyperglycemia, with long-term current use of insulin  - continue detemir 10 U BID as per last hospitalizations  - diabetic diet  - low dose ssi  Lab Results   Component Value Date    HGBA1C 5.6 08/19/2019         Acute idiopathic gout of multiple sites  - continue steroid taper and colchicine    Urinary retention  - continue bryant  - bryant exchanged in ED  - continue flomax    Transaminitis  - improving    Decreased functional mobility and endurance  - continue PT/OT    COPD, moderate  - continue breo and nebs PRN    Essential hypertension  - hold lisinopril for mild KISHA  - continue diltiazem  - start PRN hydralazine    History of CVA (cerebrovascular accident)  - continue statin, ASA      Morbid obesity with BMI of 45.0-49.9, adult  - obesity complicates all aspects of disease management from diagnostic modalities to treatment. Weight loss encouraged and health benefits explained to patient.      Obstructive sleep apnea on CPAP: setting = 8  - CPAP QHS    VTE Risk Mitigation (From admission, onward)         Ordered     enoxaparin injection 40 mg  Every 12 hours      03/06/20 0256     IP VTE HIGH RISK PATIENT  Once      03/06/20 0224                   Humberto Morejon PA-C  Department of Hospital Medicine   [facility]

## 2020-03-06 NOTE — PT/OT/SLP EVAL
Physical Therapy Evaluation    Patient Name:  Bj Pate Jr.   MRN:  [id number]    Recommendations:     Discharge Recommendations:  rehabilitation facility   Discharge Equipment Recommendations: walker, rolling(bariatric RW; continue to assess DME needs)   Barriers to discharge: Inaccessible home and Decreased caregiver support    Assessment:     Bj Pate Jr. is a 70 y.o. male admitted with a medical diagnosis of Leukocytosis.  He presents with the following impairments/functional limitations:  weakness, gait instability, decreased lower extremity function, impaired balance, decreased upper extremity function, impaired endurance, impaired self care skills, impaired functional mobilty, pain, edema . Patient participated well. Stands from EOB in elevated position w/ RW and CGA. Bed mobility w/ min assistance. Takes a few steps along EOB w/ RW and min assist. Patient will benefit from IP Rehab to maximize safety and functional mobility and progress to safe discharge home..    Rehab Prognosis: Good; patient would benefit from acute skilled PT services to address these deficits and reach maximum level of function.    Recent Surgery: * No surgery found *      Plan:     During this hospitalization, patient to be seen 3 x/week to address the identified rehab impairments via gait training, therapeutic activities, therapeutic exercises and progress toward the following goals:    · Plan of Care Expires:  04/05/20    Subjective     Chief Complaint: pain buttocks/sacrum  Patient/Family Comments/goals: return home to [facility]  Pain/Comfort:  · Pain Rating 1: 8/10  · Location 1: sacral spine  · Pain Addressed 1: Reposition, Distraction, Nurse notified  · Pain Rating Post-Intervention 1: 6/10    Patients cultural, spiritual, Mu-ism conflicts given the current situation:      Living Environment:  Patient lives alone in [facility] w/ 1 PABLO.   Prior to admission, patients level of function was independent.  Equipment used at home:  none.  DME owned (not currently used): none.  Upon discharge, patient will have assistance from uncertain.    Objective:     Communicated with nurse prior to session.  Patient found HOB elevated with bryant catheter, peripheral IV  upon PT entry to room.    General Precautions: Standard, fall   Orthopedic Precautions:N/A   Braces: Hip abduction brace     Exams:  · Cognitive Exam:  Patient is oriented to Person, Place, Time and Situation  · Gross Motor Coordination:  grossly WFL   · Postural Exam:  Patient presented with the following abnormalities:    · -       No postural abnormalities identified  · -       obese  · Skin Integrity/Edema:      · -       edema BLE, BUE  · RLE ROM: WFL  · RLE Strength: WFL  · LLE ROM: WFL  · LLE Strength: WFL   · Note: pt s/p carpal tunnel sx left UE approx 2 weeks prior and sutures in place    Functional Mobility:  · Bed Mobility:     · Supine to Sit: contact guard assistance  · Sit to Supine: stand by assistance  · Transfers:     · Sit to Stand:  contact guard assistance and bed in elevated postion with rolling walker  · Gait: 4 side steps to the left along EOB w/ RW and min assist to manage RW  · Balance: sits EOB w/ mod(I). stands w/ RW and CGA      Therapeutic Activities and Exercises:   Patient educated on PT POC, gait and trf technique, call for assistance for mobility  Patient assisted w/ andres cleanse and sheet/pads changed    AM-PAC 6 CLICK MOBILITY  Total Score:14     Patient left HOB elevated with all lines intact, call button in reach and nurse notified.    GOALS:   Multidisciplinary Problems     Physical Therapy Goals        Problem: Physical Therapy Goal    Goal Priority Disciplines Outcome Goal Variances Interventions   Physical Therapy Goal     PT, PT/OT Ongoing, Progressing     Description:  Goals to be met by: 20     Patient will increase functional independence with mobility by performin. Supine to sit with Stand-by Assistance  2. Sit to supine with  Stand-by Assistance  3. Sit to stand transfer with Stand-by Assistance with RW or appropriate AD  4. Bed to chair transfer with Contact Guard Assistance using Rolling Walker or appropriate AD  5. Gait  x 50 feet with Contact Guard Assistance using Rolling Walker or appropriate AD  6. Lower extremity exercise program x15 reps per handout, with assistance as needed                      History:     Past Medical History:   Diagnosis Date    Anticoagulant long-term use     Basal ganglia hemorrhage     Left basal ganglia hemorrhage with resultant right-sided hemiparesis which has resolved.     Benign hypertension with CKD (chronic kidney disease) stage III      Cataract     Chronic idiopathic gout of multiple sites     Chronic kidney disease, stage 3     COPD (chronic obstructive pulmonary disease)     Erectile dysfunction     Gout     Hemorrhoids without complication     Hyperlipidemia     Morbid obesity     Obstructive sleep apnea on CPAP     Stroke 2016, 2006    Thalamic infarct, acute (right) 1/2016    Type 2 DM with CKD stage 3 and hypertension     On pravastatin for cardiovascular protection.        Past Surgical History:   Procedure Laterality Date    CARPAL TUNNEL RELEASE Left 2/19/2020    Procedure: RELEASE, CARPAL TUNNEL LEFT;  Surgeon: Maria Luisa Mccurdy MD;  Location: [facility] [place] OR;  Service: Orthopedics;  Laterality: Left;    EPIDURAL STEROID INJECTION N/A 5/2/2019    Procedure: Injection, Steroid, Epidural Cervical;  Surgeon: Dariel Doan Jr., MD;  Location: [facility] ENDO;  Service: Pain Management;  Laterality: N/A;  Cervical Epidural Steroid Injection     [id number]    Arrive @ 1130; ASA; Check BG    EPIDURAL STEROID INJECTION Bilateral 5/29/2019    Procedure: Lumbar Medial Branch Blocks;  Surgeon: Dariel Doan Jr., MD;  Location: [facility] ENDO;  Service: Pain Management;  Laterality: Bilateral;  Bilateral Lumbar Medial Branch Blocks L3, L4, L5    82810  60462    Attive @ 1030 (11 arrival  request); NO Sedation; ASA; Check BG    EPIDURAL STEROID INJECTION Bilateral 7/3/2019    Procedure: Lumbar Medial Branch Blocks;  Surgeon: Dariel Doan Jr., MD;  Location: [facility] ENDO;  Service: Pain Management;  Laterality: Bilateral;  Bilateral Lumbar Medial Branch Blocks L3, L4, L5    89614  34275    Arrive @ 0930; NO Sedation; ASA; Check BG    EPIDURAL STEROID INJECTION N/A 8/7/2019    Procedure: Injection, Steroid, Epidural Cervical;  Surgeon: Dariel Doan Jr., MD;  Location: [facility] ENDO;  Service: Pain Management;  Laterality: N/A;  Cervical Epidural Steroid Injection C7-T1    38617    Arrive @ 1115; Last ASA 7/30; Check BG    NO PAST SURGERIES         Time Tracking:     PT Received On: 03/06/20  PT Start Time: 1027     PT Stop Time: 1056  PT Total Time (min): 29 min     Billable Minutes: Evaluation 10 and Gait Training 19      Grace Campbell, PT  03/06/2020

## 2020-03-06 NOTE — ASSESSMENT & PLAN NOTE
- IVFs   8/30/2019    Chief Complaint   Patient presents with   • Follow-up       HPI:    Sly is a 15 month old male who was last seen in nephrology clinic on 1/11/19 secondary to congenital hydronephrosis.  Today he is accompanied by his parents.  Today had a CHAYO.  No issues since last visit--> no urinary infections, no hematuria, no dysuria.  Still with harder bowel movements: since whole milk.      Review of Systems   Constitutional: Negative.  Negative for fever.   HENT: Negative.    Eyes: Negative.    Respiratory: Negative for cough.    Cardiovascular: Negative.    Gastrointestinal: Negative.  Negative for blood in stool, constipation, diarrhea and vomiting.   Genitourinary: Negative for decreased urine volume and hematuria.   Musculoskeletal: Negative.    Skin: Negative for rash.       Current Medications:   No current outpatient medications on file.     No current facility-administered medications for this visit.          ALLERGIES:  No Known Allergies      Relevant Past Medical History:    Past Medical History:   Diagnosis Date   • Hydrocele in infant        Past Surgical History:   Procedure Laterality Date   • Circumcision baby          Family History   Problem Relation Age of Onset   • Seizure Disorder Mother         Neurocysticercosis   • Patient is unaware of any medical problems Father    • Cancer Maternal Grandfather    • Diabetes Maternal Grandfather    • Hypertension Paternal Grandmother        Social History     Patient does not qualify to have social determinant information on file (likely too young).   Social History Narrative    Lives with parents and brother    Father:  ; [employer]    Mother: student/--> Awaiting bar results    [place], [place]       Examination:   Blood pressure 110/67, height 28.98\" (73.6 cm), weight 10.8 kg (23 lb 13 oz).  Weight    08/30/19 0927   Weight: 10.8 kg (23 lb 13 oz)       Physical Exam   HENT:   Nose: Nose normal. No nasal discharge.    Mouth/Throat: Mucous membranes are moist. Oropharynx is clear.   Eyes: Pupils are equal, round, and reactive to light. Conjunctivae are normal.   Neck: Neck supple.   Cardiovascular: Normal rate, regular rhythm, S1 normal and S2 normal.   Pulmonary/Chest: Effort normal and breath sounds normal. No nasal flaring. No respiratory distress. He exhibits no retraction.   Abdominal: Soft. Bowel sounds are normal. He exhibits no distension. There is no tenderness. Musculoskeletal: Normal range of motion.         General: No edema.      Right shoulder: He exhibits no swelling.     Neurological: He is alert.   Skin: Skin is warm. Capillary refill takes less than 3 seconds. No rash noted.   Nursing note and vitals reviewed.      1/11/18: CHAYO: Right 6.32cm, Left 6.58cm, left mild pelviectasis  8/30/2019: Renal ultrasound: Right 6.74 cm, left 6.87 cm; mild left superior pole pelviectasis.  Right kidney with small extra renal pelvis.  Pelviectasis seen on right thought to be within normal limits.  Personally reviewed the images of the renal ultrasound and discussed with family    Assessment/Plan:  Nahum is a 15 month old male with history of prenatal hydronephrosis, and who now has mild bilateral pelviectasis and no history of UTI.  Have discussed evaluations for VUR, UPJ and UVJ obstructions.  Previously discussed the general role, function and anatomy of the renal system with the family.  Holding off on VCUG for now.    Problem List Items Addressed This Visit        Urinary    Congenital hydronephrosis - Primary     -Holding off on VCUG for now  -Please notify nephrology if develops UTI  - No serum testing today  - Borderline blood pressures in clinic today, however was upset just prior to obtaining.  If has persistently elevated blood pressures as an outpatient with PCP, please refer to nephrology sooner  - Follow-up in nephrology clinic in approximately 6-9 months time with a repeat renal ultrasound         Relevant Orders     US KIDNEY BILATERAL        Return in about 6 months (around 2/29/2020).        Renate Castro MD

## 2020-03-06 NOTE — PROGRESS NOTES
Pharmacokinetic Initial Assessment: IV Vancomycin    Assessment/Plan:    -Initiate pulse dosing of vancomycin in the setting of KISHA. Giving x 1 1500 mg (10 mg/kg - see below OSH weight from 3/5) for SSTI.  -Trough goal 10-15 mcg/mL.   -ke=0.057 hr-1  -T 1/2 = 12.2 hrs   -Obtain daily random vancomycin levels and re-dose for levels < 15 mcg/mL.     Please contact pharmacy at extension [phone] with any questions regarding this assessment.     Thank you for the consult,   Tc Li       Patient brief summary:  Bj Pate Jr. is a 70 y.o. male initiated on antimicrobial therapy with IV Vancomycin for treatment of suspected skin & soft tissue infection    Drug Allergies:   Review of patient's allergies indicates:   Allergen Reactions    Tomato (solanum lycopersicum) Hives    Naproxen Hives    Shrimp Other (See Comments)       Actual Body Weight:   148.9 kg (from OSH) - AdjBW = 102 kg     Renal Function:   C-G CrCl using Adj BW - 63.8 mL/min    Dialysis Method (if applicable):  N/A    CBC (last 72 hours):  Recent Labs   Lab Result Units 03/05/20  2245   WBC K/uL 65.75*   Hemoglobin g/dL 10.5*   Hematocrit % 32.4*   Platelets K/uL 364*   Gran% % 90.5*   Lymph% % 1.0*   Mono% % 4.5   Eosinophil% % 0.0   Basophil% % 0.0   Differential Method  Manual       Metabolic Panel (last 72 hours):  Recent Labs   Lab Result Units 03/05/20  2245 03/06/20  0007   Sodium mmol/L 131*  --    Potassium mmol/L 5.2*  --    Chloride mmol/L 100  --    CO2 mmol/L 22*  --    Glucose mg/dL 166*  --    Glucose, UA   --  Negative   BUN, Bld mg/dL 62*  --    Creatinine mg/dL 1.6*  --    Albumin g/dL 2.0*  --    Total Bilirubin mg/dL 0.8  --    Alkaline Phosphatase U/L 124  --    AST U/L 41*  --    ALT U/L 167*  --        Drug levels (last 3 results):  No results for input(s): VANCOMYCINRA, VANCOMYCINPE, VANCOMYCINTR in the last 72 hours.    Microbiologic Results:  Microbiology Results (last 7 days)     Procedure Component Value Units  Date/Time    Influenza A & B by Molecular [[id number]] Collected:  03/06/20 0445    Order Status:  Completed Specimen:  Nasopharyngeal Swab Updated:  03/06/20 0518     Influenza A, Molecular Negative     Influenza B, Molecular Negative     Flu A & B Source Nasal swab    Urine culture [[id number]] Collected:  03/06/20 0007    Order Status:  No result Specimen:  Urine Updated:  03/06/20 0046    Blood culture x two cultures. Draw prior to antibiotics. [[id number]] Collected:  03/05/20 2315    Order Status:  Sent Specimen:  Blood from Peripheral, Forearm, Left Updated:  03/05/20 2321    Blood culture x two cultures. Draw prior to antibiotics. [[id number]] Collected:  03/05/20 2245    Order Status:  Sent Specimen:  Blood from Peripheral, Forearm, Left Updated:  03/05/20 2321

## 2020-03-06 NOTE — HPI
Bj Pate Jr. is a 70M with CVA w/o residual deficit, COPD, HILARIO on CPAP, Gout, DDD of Lumbar, Cervical Radiculopathy, HTN, urinary retention with chronic bryant, and recent admission for polyarticular gout. He was discharged on steroids and colchicine to rehab. He had positive blood cultures from that admission that were contaminate, he was not discharged on antibiotics. LFTs transiently worsened during admission, suspected from rhabdo. Note his anti smooth muscle ab came back positive but was negligible.     He presents today from rehab for worsening leukocytosis. On discharge 03/02, patient had WBC 14, today 65. He was discharged on prednisone 40 mg daily, but received 80 mg daily while admitted. His highest WBC count during that admission was 22. He denies fever at rehab and only complains of laryngitis symptoms and wound on his buttock. He denies URI symptoms, cough, dysuria, flank pain, abdominal pain, NVD, cancer history, night sweats, weight loss. He continues to progress slowly at rehab.    ED: AFVSS, WBC 65, LA 1.5,  from 434, LFTs mildly elevated but improving, SCr 1.6 from 1.2, UA +, bryant exchanged, CXR clear, CPK 64

## 2020-03-06 NOTE — ASSESSMENT & PLAN NOTE
- start cefepime given prolonged recent hospitalization and extreme elevation in WBC (start date 03/06)  - follow UCX  - 1/4 SIRS for leukocytosis, LA 1.5

## 2020-03-06 NOTE — PLAN OF CARE
Plan of care reviewed with pt, who verbalized understanding. Rivera in place w/ AUO. Pt rolling independently in the bed. Pt sat on the edge of the bed w/ RN. Waffle mattress applied to bed. Sacral wound dressed w/ triad cream, mepilex placed. BG monitored, insulin per MAR, pt needs assistance w/ eating meals due to limitations w/ hand movement. Pain meds given for back pain. Call bell in reach, bed locked in lowest position. Frequent rounds made for pt safety. AAOx4, VSS, will continue to monitor.

## 2020-03-06 NOTE — ASSESSMENT & PLAN NOTE
- higher than anticipated elevation in WBC despite tapering steroids for gout. During prior admission, patient's highest WBC (while on higher doses of steroids) was 22.   - Patient now with UTI and developing/new gluteal pressure ulcer  - treat UTI as below  - add on vancomycin for gluteal cleft wound  - continue steroids  - +/- heme/onc consult w/o improvement  - check flu given laryngitis  - follow BCx  - CRP improving 434>293

## 2020-03-06 NOTE — PLAN OF CARE
Problem: Physical Therapy Goal  Goal: Physical Therapy Goal  Description  Goals to be met by: 20     Patient will increase functional independence with mobility by performin. Supine to sit with Stand-by Assistance  2. Sit to supine with Stand-by Assistance  3. Sit to stand transfer with Stand-by Assistance with RW or appropriate AD  4. Bed to chair transfer with Contact Guard Assistance using Rolling Walker or appropriate AD  5. Gait  x 50 feet with Contact Guard Assistance using Rolling Walker or appropriate AD  6. Lower extremity exercise program x15 reps per handout, with assistance as needed     PT evaluation completed. Grace Campbell, PT 3/6/2020

## 2020-03-06 NOTE — ASSESSMENT & PLAN NOTE
- wound care and turn q2H  - starting vancomycin but difficult to assess if wound infected on admission 2/2 habitus

## 2020-03-06 NOTE — ASSESSMENT & PLAN NOTE
- continue detemir 10 U BID as per last hospitalizations  - diabetic diet  - low dose ssi  Lab Results   Component Value Date    HGBA1C 5.6 08/19/2019

## 2020-03-06 NOTE — PLAN OF CARE
Plan of care reviewed with pt. Verbalized understanding.Pt transferred from ER to unit. PT transferred to ultrasound.  Pt AAOx4. VS stable on CPAP. Pt does not complain of pain. Pt voided 800ml per bryant. Administered 1.5L bolus. Pt tolerating cardiac and diabetic diet.Flu test completed.  No complaints of nausea. Frequent made for pt safety. Fall risk and allergy bands on pt.  Bed low and locked, call light in reach. WCTM

## 2020-03-06 NOTE — PT/OT/SLP EVAL
"Occupational Therapy   Evaluation (Co-Eval with PT)    Name: Bj Pate Jr.  MRN: [id number]  Admitting Diagnosis:  Leukocytosis      Recommendations:     Discharge Recommendations: rehabilitation facility  Discharge Equipment Recommendations:  (TBD)  Barriers to discharge:  Other (Comment)(Requires increased skilled assistance)    Assessment:     Bj Pate Jr. is a 70 y.o. male with a medical diagnosis of Leukocytosis.  He presents with a decline in occupational participation and functional mobility. Patient is pleasant throughout and tolerates evaluation well. Performance deficits affecting function: weakness, impaired endurance, impaired self care skills, impaired functional mobilty, gait instability, impaired balance, decreased upper extremity function, pain, decreased ROM, impaired fine motor, edema, impaired cardiopulmonary response to activity.      Rehab Prognosis: Good; patient would benefit from acute skilled OT services to address these deficits and reach maximum level of function.       Plan:     Patient to be seen 3 x/week to address the above listed problems via self-care/home management, therapeutic activities, therapeutic exercises  · Plan of Care Expires: 04/06/20  · Plan of Care Reviewed with: patient    Subjective     Chief Complaint: "It hurts on my tailbone."  Patient/Family Comments/goals: To feel better and return to PLOF    Occupational Profile:  Living Environment: Patient lives alone in a [facility] with 2 PABLO. His bathroom has a tub/shower combination.  Previous level of function: Independent PTA  Roles and Routines: Father. Patient drives and is a retired . He enjoys watching TV and relaxing.  Equipment Used at Home:  none  Assistance upon Discharge: Patient's son lives next door and is available to assist when not at work    Pain/Comfort:  · Pain Rating 1: 8/10  · Location 1: sacral spine  · Pain Addressed 1: Reposition, Distraction  · Pain Rating Post-Intervention 1: " 6/10    Patients cultural, spiritual, Mandaeism conflicts given the current situation: no    Objective:     Communicated with: RN prior to session.  Patient found HOB elevated with peripheral IV, bryant catheter upon OT entry to room.    General Precautions: Standard, fall   Orthopedic Precautions:N/A   Braces: N/A     Occupational Performance:    Bed Mobility:    · Patient completed Scooting anteriorly to EOB for foot placement on floor with contact guard assistance  · Patient completed Supine to Sit to L side EOB via log roll with contact guard assistance utilizing the bed rail  · Patient completed Sit to Supine via log roll with contact guard assistance utilizing the bed rail     Functional Mobility/Transfers:  · Patient completed Sit <> Stand Transfer with minimum assistance  with  rolling walker   · Functional Mobility: Patient took ~3 steps laterally to his right towards the HOB with min assist and the RW.    Activities of Daily Living:  · Upper Body Dressing: minimum assistance Patient donned hospital gown while supine in bed with min assist for line management and tying.  · Toileting: total assistance Patient completed toileting while supine in bed requiring total assist for andres-care while standing.    Cognitive/Visual Perceptual:  Cognitive/Psychosocial Skills:     -       Oriented to: Person, Place, Time and Situation   -       Follows Commands/attention:Follows multistep  commands    Physical Exam:  Upper Extremity Range of Motion:     -       Right Upper Extremity: WFL  -       Left Upper Extremity: WFL  Upper Extremity Strength:    -       Right Upper Extremity: WFL  -       Left Upper Extremity: DNT 2' carpal tunnel surgery ~2 weeks ago  Fine Motor Coordination:    -       Impaired  Left hand thumb/finger opposition skills 2' edema and Right hand thumb/finger opposition skills 2' edema    AMPAC 6 Click ADL:  AMPAC Total Score: 12    Treatment & Education:  Role of OT/evaluation  Call button for  assistance  Education:    Patient left HOB elevated with all lines intact, call button in reach and RN notified    GOALS:   Multidisciplinary Problems     Occupational Therapy Goals        Problem: Occupational Therapy Goal    Goal Priority Disciplines Outcome Interventions   Occupational Therapy Goal     OT, PT/OT Ongoing, Progressing    Description:  Goals to be met by: 3/20/20     Patient will increase functional independence with ADLs by performing:    LE Dressing with Moderate Assistance and AE PRN.  Grooming while standing with Minimum Assistance.  Toileting from toilet with Maximum Assistance for hygiene and clothing management.   Toilet transfer to toilet with Contact Guard Assistance.                      History:     Past Medical History:   Diagnosis Date    Anticoagulant long-term use     Basal ganglia hemorrhage     Left basal ganglia hemorrhage with resultant right-sided hemiparesis which has resolved.     Benign hypertension with CKD (chronic kidney disease) stage III      Cataract     Chronic idiopathic gout of multiple sites     Chronic kidney disease, stage 3     COPD (chronic obstructive pulmonary disease)     Erectile dysfunction     Gout     Hemorrhoids without complication     Hyperlipidemia     Morbid obesity     Obstructive sleep apnea on CPAP     Stroke 2016, 2006    Thalamic infarct, acute (right) 1/2016    Type 2 DM with CKD stage 3 and hypertension     On pravastatin for cardiovascular protection.        Past Surgical History:   Procedure Laterality Date    CARPAL TUNNEL RELEASE Left 2/19/2020    Procedure: RELEASE, CARPAL TUNNEL LEFT;  Surgeon: Maria Luisa Mccurdy MD;  Location: [facility] OR;  Service: Orthopedics;  Laterality: Left;    EPIDURAL STEROID INJECTION N/A 5/2/2019    Procedure: Injection, Steroid, Epidural Cervical;  Surgeon: Dariel Doan Jr., MD;  Location: [facility] ENDO;  Service: Pain Management;  Laterality: N/A;  Cervical Epidural Steroid Injection      20563    Arrive @ 1130; ASA; Check BG    EPIDURAL STEROID INJECTION Bilateral 5/29/2019    Procedure: Lumbar Medial Branch Blocks;  Surgeon: Dariel Doan Jr., MD;  Location: [facility] ENDO;  Service: Pain Management;  Laterality: Bilateral;  Bilateral Lumbar Medial Branch Blocks L3, L4, L5    [id number]  [id number]    Attive @ 1030 (11 arrival request); NO Sedation; ASA; Check BG    EPIDURAL STEROID INJECTION Bilateral 7/3/2019    Procedure: Lumbar Medial Branch Blocks;  Surgeon: Dariel Doan Jr., MD;  Location: [facility] ENDO;  Service: Pain Management;  Laterality: Bilateral;  Bilateral Lumbar Medial Branch Blocks L3, L4, L5    37488  [id number]    Arrive @ 0930; NO Sedation; ASA; Check BG    EPIDURAL STEROID INJECTION N/A 8/7/2019    Procedure: Injection, Steroid, Epidural Cervical;  Surgeon: Dariel Doan Jr., MD;  Location: [facility] ENDO;  Service: Pain Management;  Laterality: N/A;  Cervical Epidural Steroid Injection C7-T1    13535    Arrive @ 1115; Last ASA 7/30; Check BG    NO PAST SURGERIES         Time Tracking:     OT Date of Treatment: 03/06/20  OT Start Time: 1029  OT Stop Time: 1057  OT Total Time (min): 28 min    Billable Minutes:Evaluation 15 minutes  Self Care/Home Management 8 minutes    Aaliyah Currie OT  3/6/2020

## 2020-03-06 NOTE — ED PROVIDER NOTES
Encounter Date: 3/5/2020       History     Chief Complaint   Patient presents with    Abnormal Lab     Tx from [facility] for elevated WBC of 61 today. Recent sx of left carpal tunnel sx. Bed bound pmhx of COPD 91% on RA, stroke no neuro deficits.      HPI     71yo male with a past medical history of CVA without residual deficit, COPD, HILARIO on CPAP, Gout, DDD of Lumbar, Cervical Radiculopathy, HTN and recent Left CTS surgery presented on last admission with rapid onset polyarthritis of his UE and LE with associated fever, fatigue and decreased po intake, patient was admitted found to have gouty arthritis.  Patient also had a blood cultures positive for staph and was treated with vancomycin.  He was discharged to rehab center.  Sent from rehab center for increasing white blood cell count is now 60.  Patient has no focal complaints at this time, he denies new pain, , chest pain, shortness of breath, headache, abdominal pain, new extremity pain. Patient has not been ambulatory since his discharge secondary to deconditioning?    Patient does have a chronic indwelling Rivera catheter for urinary retention which he states has been somewhat bothering him.      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     Benign hypertension with CKD (chronic kidney disease) stage III      Cataract     Chronic idiopathic gout of multiple sites     Chronic kidney disease, stage 3     COPD (chronic obstructive pulmonary disease)     Erectile dysfunction     Gout     Hemorrhoids without complication     Hyperlipidemia     Morbid obesity     Obstructive sleep apnea on CPAP     Stroke 2016, 2006    Thalamic infarct, acute  (right) 1/2016    Type 2 DM with CKD stage 3 and hypertension     On pravastatin for cardiovascular protection.      Past Surgical History:   Procedure Laterality Date    CARPAL TUNNEL RELEASE Left 2/19/2020    Procedure: RELEASE, CARPAL TUNNEL LEFT;  Surgeon: Maria Luisa Mccurdy MD;  Location: [facility];  Service: Orthopedics;  Laterality: Left;    EPIDURAL STEROID INJECTION N/A 5/2/2019    Procedure: Injection, Steroid, Epidural Cervical;  Surgeon: Dariel Doan Jr., MD;  Location: [facility] END[facility];  Service: Pain Management;  Laterality: N/A;  Cervical Epidural Steroid Injection     61900    Arrive @ 1130; ASA; Check BG    EPIDURAL STEROID INJECTION Bilateral 5/29/2019    Procedure: Lumbar Medial Branch Blocks;  Surgeon: Dariel Doan Jr., MD;  Location: [facility] END[facility];  Service: Pain Management;  Laterality: Bilateral;  Bilateral Lumbar Medial Branch Blocks L3, L4, L5    44542  [id number]    Attive @ 1030 (11 arrival request); NO Sedation; ASA; Check BG    EPIDURAL STEROID INJECTION Bilateral 7/3/2019    Procedure: Lumbar Medial Branch Blocks;  Surgeon: Dariel Doan Jr., MD;  Location: [facility] END[facility];  Service: Pain Management;  Laterality: Bilateral;  Bilateral Lumbar Medial Branch Blocks L3, L4, L5    07425  31136    Arrive @ 0930; NO Sedation; ASA; Check BG    EPIDURAL STEROID INJECTION N/A 8/7/2019    Procedure: Injection, Steroid, Epidural Cervical;  Surgeon: Dariel Doan Jr., MD;  Location: [facility] END[facility];  Service: Pain Management;  Laterality: N/A;  Cervical Epidural Steroid Injection C7-T1    30779    Arrive @ 1115; Last ASA 7/30; Check BG    NO PAST SURGERIES       Family History   Problem Relation Age of Onset    No Known Problems Mother     No Known Problems Father     Hypertension Unknown     Diabetes Unknown     Diabetes Paternal Grandfather     Heart disease Paternal Grandfather     No Known Problems Sister     No Known Problems Brother     No Known Problems Maternal Aunt     No  Known Problems Maternal Uncle     No Known Problems Paternal Aunt     No Known Problems Paternal Uncle     No Known Problems Maternal Grandmother     No Known Problems Maternal Grandfather     No Known Problems Paternal Grandmother     Amblyopia Neg Hx     Blindness Neg Hx     Cancer Neg Hx     Cataracts Neg Hx     Glaucoma Neg Hx     Macular degeneration Neg Hx     Retinal detachment Neg Hx     Strabismus Neg Hx     Stroke Neg Hx     Thyroid disease Neg Hx      Social History     Tobacco Use    Smoking status: Never Smoker    Smokeless tobacco: Never Used   Substance Use Topics    Alcohol use: Yes     Alcohol/week: 0.0 standard drinks     Comment: occacionally    Drug use: No     Review of Systems   Constitutional:  No Fever, No Chills,   Eyes: No Vision Changes  ENT/Mouth: No sore throat, No rhinorrhea  Cardiovascular:  No Chest Pain, No Palpitations  Respiratory:  No Cough, No SOB  Gastrointestinal:  No Nausea, No Vomiting, No Diarrhea, No abdo pain.  Genitourinary:  Indwelling Rivera catheter   Musculoskeletal:  No Arthralgias, No Back Pain, No Neck Pain, No recent trauma.  Skin:  No skin Lesions  Neuro:  No Weakness, , No Dizziness, No Headache        Physical Exam     Initial Vitals [03/05/20 2201]   BP Pulse Resp Temp SpO2   128/86 88 20 98.5 °F (36.9 °C) 99 %      MAP       --         Physical Exam   Physical Exam:  CONSTITUTIONAL: Well developed, well nourished, in no acute distress, morbidly obese  HENT: Normocephalic, atraumatic    EYES: Sclerae anicteric   NECK: Supple, no thyroid enlargement  CARDIOVASCULAR: Regular rate and rhythm without any murmurs, gallops, rubs.  RESPIRATORY: Speaking in full sentences. Breathing comfortably. Auscultation of the lungs revealed normal breath sounds b/l, no wheezing, no rales, no rhonchi.  ABDOMEN: Soft and nontender, no masses, no rebound or guarding   NEUROLOGIC: Alert, interacting normally. No facial droop.   MSK:  Bilateral swelling hands with  no erythema or focal tenderness.  There is a healing CT as incision on his left palm are with no surrounding erythema or tenderness. Incision and sutures are clean dry intact. Moving all four extremities.  Skin: Warm and dry. No visible rash on exposed areas of skin.    Psych: Mood and affect normal.       ED Course   Procedures  Labs Reviewed - No data to display  EKG Readings: (Independently Interpreted)   Normal sinus rhythm at rate of 85 with all ischemic changes slight tremulous artifact       Imaging Results    None          Medical Decision Making:   History:   Old Medical Records: I decided to obtain old medical records.    70-year-old male with past medical history as noted coming in for elevated WBC count.  His exam is nonfocal at this time.  One take septic workup with full set of labs, lactate, blood cultures, urine studies, chest x-ray to look for any new signs of infection.  Given his level of WBC question possible leukemia?  Dispo per ED workup.     Update:   WBC now 65. Other labs are at baseline (crea 1.6 from baseline of 1.2-1.4)  UA very questionable infection, will give abx, although infection is unlikely to cause such high WBCs.  Also got steroids, but again should not cause WBC in the 60s.   Pt HD stable.  Will admit to medicine for Hematology consults, smear, follow up cultures.     MDM Complexity Points:   Problem Points:  1.New problem, with additional ED work-up planned - elevated WBC to the 60s, possible UTI.     Data Points:  Review or order clinical lab tests, Review or order radiology test, Review or order medicine test (PFTs, EKG, cardiac echo or catheterization), Independent review of image, tracing, or specimen, Decision to obtain old records (in the EHR), Review and summarization of old records and Discuss test with performing physician/consulting physician    Risk:  High Risk                                     Clinical Impression:       ICD-10-CM ICD-9-CM   1. Urinary tract  infection with hematuria, site unspecified N39.0 599.0    R31.9 599.70   2. Elevated WBC count D72.829 288.60                                Duong Josue MD  03/06/20 0903

## 2020-03-07 LAB
ANION GAP SERPL CALC-SCNC: 7 MMOL/L (ref 8–16)
ANISOCYTOSIS BLD QL SMEAR: SLIGHT
BACTERIA UR CULT: ABNORMAL
BASOPHILS # BLD AUTO: ABNORMAL K/UL (ref 0–0.2)
BASOPHILS NFR BLD: 0 % (ref 0–1.9)
BUN SERPL-MCNC: 41 MG/DL (ref 8–23)
CALCIUM SERPL-MCNC: 8.9 MG/DL (ref 8.7–10.5)
CHLORIDE SERPL-SCNC: 102 MMOL/L (ref 95–110)
CO2 SERPL-SCNC: 23 MMOL/L (ref 23–29)
CREAT SERPL-MCNC: 1.1 MG/DL (ref 0.5–1.4)
DIFFERENTIAL METHOD: ABNORMAL
EOSINOPHIL # BLD AUTO: ABNORMAL K/UL (ref 0–0.5)
EOSINOPHIL NFR BLD: 0 % (ref 0–8)
ERYTHROCYTE [DISTWIDTH] IN BLOOD BY AUTOMATED COUNT: 14.9 % (ref 11.5–14.5)
EST. GFR  (AFRICAN AMERICAN): >60 ML/MIN/1.73 M^2
EST. GFR  (NON AFRICAN AMERICAN): >60 ML/MIN/1.73 M^2
ESTIMATED AVG GLUCOSE: 131 MG/DL (ref 68–131)
GLUCOSE SERPL-MCNC: 114 MG/DL (ref 70–110)
HBA1C MFR BLD HPLC: 6.2 % (ref 4–5.6)
HCT VFR BLD AUTO: 30.8 % (ref 40–54)
HGB BLD-MCNC: 10 G/DL (ref 14–18)
HYPOCHROMIA BLD QL SMEAR: ABNORMAL
IMM GRANULOCYTES # BLD AUTO: ABNORMAL K/UL (ref 0–0.04)
IMM GRANULOCYTES NFR BLD AUTO: ABNORMAL % (ref 0–0.5)
LYMPHOCYTES # BLD AUTO: ABNORMAL K/UL (ref 1–4.8)
LYMPHOCYTES NFR BLD: 4 % (ref 18–48)
MAGNESIUM SERPL-MCNC: 1.7 MG/DL (ref 1.6–2.6)
MCH RBC QN AUTO: 28.1 PG (ref 27–31)
MCHC RBC AUTO-ENTMCNC: 32.5 G/DL (ref 32–36)
MCV RBC AUTO: 87 FL (ref 82–98)
MONOCYTES # BLD AUTO: ABNORMAL K/UL (ref 0.3–1)
MONOCYTES NFR BLD: 4 % (ref 4–15)
MYELOCYTES NFR BLD MANUAL: 1 %
NEUTROPHILS NFR BLD: 88 % (ref 38–73)
NEUTS BAND NFR BLD MANUAL: 3 %
NRBC BLD-RTO: 0 /100 WBC
PHOSPHATE SERPL-MCNC: 2.8 MG/DL (ref 2.7–4.5)
PLATELET # BLD AUTO: 390 K/UL (ref 150–350)
PLATELET BLD QL SMEAR: ABNORMAL
PMV BLD AUTO: 10.9 FL (ref 9.2–12.9)
POCT GLUCOSE: 101 MG/DL (ref 70–110)
POCT GLUCOSE: 164 MG/DL (ref 70–110)
POCT GLUCOSE: 176 MG/DL (ref 70–110)
POCT GLUCOSE: 182 MG/DL (ref 70–110)
POCT GLUCOSE: 196 MG/DL (ref 70–110)
POTASSIUM SERPL-SCNC: 5.2 MMOL/L (ref 3.5–5.1)
RBC # BLD AUTO: 3.56 M/UL (ref 4.6–6.2)
SODIUM SERPL-SCNC: 132 MMOL/L (ref 136–145)
VANCOMYCIN SERPL-MCNC: 6.9 UG/ML
WBC # BLD AUTO: 43.33 K/UL (ref 3.9–12.7)

## 2020-03-07 PROCEDURE — C9399 UNCLASSIFIED DRUGS OR BIOLOG: HCPCS | Performed by: PHYSICIAN ASSISTANT

## 2020-03-07 PROCEDURE — 83735 ASSAY OF MAGNESIUM: CPT

## 2020-03-07 PROCEDURE — 83036 HEMOGLOBIN GLYCOSYLATED A1C: CPT

## 2020-03-07 PROCEDURE — 25000003 PHARM REV CODE 250: Performed by: INTERNAL MEDICINE

## 2020-03-07 PROCEDURE — 99231 SBSQ HOSP IP/OBS SF/LOW 25: CPT | Mod: ,,, | Performed by: INTERNAL MEDICINE

## 2020-03-07 PROCEDURE — 63600175 PHARM REV CODE 636 W HCPCS: Performed by: PHYSICIAN ASSISTANT

## 2020-03-07 PROCEDURE — 99231 PR SUBSEQUENT HOSPITAL CARE,LEVL I: ICD-10-PCS | Mod: ,,, | Performed by: INTERNAL MEDICINE

## 2020-03-07 PROCEDURE — 25000003 PHARM REV CODE 250: Performed by: PHYSICIAN ASSISTANT

## 2020-03-07 PROCEDURE — 99900035 HC TECH TIME PER 15 MIN (STAT)

## 2020-03-07 PROCEDURE — 84100 ASSAY OF PHOSPHORUS: CPT

## 2020-03-07 PROCEDURE — 94660 CPAP INITIATION&MGMT: CPT

## 2020-03-07 PROCEDURE — 80202 ASSAY OF VANCOMYCIN: CPT

## 2020-03-07 PROCEDURE — 85027 COMPLETE CBC AUTOMATED: CPT

## 2020-03-07 PROCEDURE — 80048 BASIC METABOLIC PNL TOTAL CA: CPT

## 2020-03-07 PROCEDURE — 85007 BL SMEAR W/DIFF WBC COUNT: CPT

## 2020-03-07 PROCEDURE — 11000001 HC ACUTE MED/SURG PRIVATE ROOM

## 2020-03-07 PROCEDURE — 27000221 HC OXYGEN, UP TO 24 HOURS

## 2020-03-07 PROCEDURE — 94761 N-INVAS EAR/PLS OXIMETRY MLT: CPT

## 2020-03-07 RX ORDER — LISINOPRIL 20 MG/1
40 TABLET ORAL DAILY
Status: DISCONTINUED | OUTPATIENT
Start: 2020-03-07 | End: 2020-03-07

## 2020-03-07 RX ORDER — OXYBUTYNIN CHLORIDE 5 MG/1
5 TABLET ORAL 3 TIMES DAILY
Status: DISCONTINUED | OUTPATIENT
Start: 2020-03-07 | End: 2020-03-10 | Stop reason: HOSPADM

## 2020-03-07 RX ADMIN — VITAMIN D, TAB 1000IU (100/BT) 1000 UNITS: 25 TAB at 09:03

## 2020-03-07 RX ADMIN — OXYBUTYNIN CHLORIDE 5 MG: 5 TABLET ORAL at 11:03

## 2020-03-07 RX ADMIN — PREDNISONE 20 MG: 10 TABLET ORAL at 09:03

## 2020-03-07 RX ADMIN — TAMSULOSIN HYDROCHLORIDE 0.4 MG: 0.4 CAPSULE ORAL at 09:03

## 2020-03-07 RX ADMIN — CEFEPIME 1 G: 1 INJECTION, POWDER, FOR SOLUTION INTRAMUSCULAR; INTRAVENOUS at 05:03

## 2020-03-07 RX ADMIN — INSULIN DETEMIR 10 UNITS: 100 INJECTION, SOLUTION SUBCUTANEOUS at 09:03

## 2020-03-07 RX ADMIN — ATORVASTATIN CALCIUM 40 MG: 20 TABLET, FILM COATED ORAL at 09:03

## 2020-03-07 RX ADMIN — DILTIAZEM HYDROCHLORIDE 240 MG: 120 CAPSULE, COATED, EXTENDED RELEASE ORAL at 09:03

## 2020-03-07 RX ADMIN — OXYBUTYNIN CHLORIDE 5 MG: 5 TABLET ORAL at 09:03

## 2020-03-07 RX ADMIN — OXYCODONE HYDROCHLORIDE 10 MG: 10 TABLET ORAL at 09:03

## 2020-03-07 RX ADMIN — CEFEPIME 1 G: 1 INJECTION, POWDER, FOR SOLUTION INTRAMUSCULAR; INTRAVENOUS at 11:03

## 2020-03-07 RX ADMIN — OXYCODONE HYDROCHLORIDE 10 MG: 10 TABLET ORAL at 11:03

## 2020-03-07 RX ADMIN — ACETAMINOPHEN 650 MG: 325 TABLET ORAL at 05:03

## 2020-03-07 RX ADMIN — Medication 6 MG: at 09:03

## 2020-03-07 RX ADMIN — Medication 500 MG: at 09:03

## 2020-03-07 RX ADMIN — ENOXAPARIN SODIUM 40 MG: 100 INJECTION SUBCUTANEOUS at 09:03

## 2020-03-07 RX ADMIN — OXYBUTYNIN CHLORIDE 5 MG: 5 TABLET ORAL at 05:03

## 2020-03-07 RX ADMIN — ASPIRIN 81 MG: 81 TABLET, COATED ORAL at 09:03

## 2020-03-07 RX ADMIN — CEFEPIME 1 G: 1 INJECTION, POWDER, FOR SOLUTION INTRAMUSCULAR; INTRAVENOUS at 02:03

## 2020-03-07 NOTE — PLAN OF CARE
Problem: Wound  Goal: Optimal Wound Healing  Outcome: Ongoing, Progressing     Problem: Infection  Goal: Infection Symptom Resolution  Outcome: Ongoing, Progressing     Problem: Adult Inpatient Plan of Care  Goal: Plan of Care Review  Outcome: Ongoing, Progressing  Goal: Patient-Specific Goal (Individualization)  Outcome: Ongoing, Progressing  Goal: Absence of Hospital-Acquired Illness or Injury  Outcome: Ongoing, Progressing  Goal: Optimal Comfort and Wellbeing  Outcome: Ongoing, Progressing  Goal: Readiness for Transition of Care  Outcome: Ongoing, Progressing  Goal: Rounds/Family Conference  Outcome: Ongoing, Progressing   Patient is AAOx4. Requires one person assist with ADLl's. Consumed 75% of meals. Rivera catheter discontinued per MD orders. Patient is scheduled for US of right upper extremity d/t edema. Pain is managed with PRN Oxycodone per MD orders. Safety measures maintained. Call light within reach.

## 2020-03-07 NOTE — PROGRESS NOTES
[facility] Hospital Medicine                                                                     Progress Note     Team: [facility] [place] [place] HOSP MED G Guilherme Anderson MD   Admit Date: 3/5/2020   Hospital Day: 1  GLORIA: 3/9/2020   Code status: Full Code   Principal Problem: Leukocytosis     SUMMARY:     Bj Pate Jr. is a 70M with CVA w/o residual deficit, COPD, HILARIO on CPAP, Gout, DDD of Lumbar, Cervical Radiculopathy, HTN, and recent admission for polyarticular gout (discharged on prednisone and colchicine) and urinary retention s/p recent bryant admitted for worsening leukocytosis of ~65. Admitted to hospital medicine for suspected UTI vs wound infection. Bryant exchanged in the ED.  Started on bs-abx. Urine with GNR >100K. Wound care seen patient, and wound does not look infected. Remains HDS and afebrile. Leucocytosis resolving. Remains on BS-abx. Plan to remove bryant and attempt voiding trials.       SUBJECTIVE:     Pt was seen and examined at bedside. Pt had no acute events overnight, and no new complaints this morning. Pt remained hemodynamically stable and afebrile. No new complaints other than arthralgias, myalgia, and some bryant site pain. Pt has been tolerating PO intake well without any nausea, vomiting, diarrhea, constipation, blood in stools. Pt denies any fevers, chills, malaise, headaches, chest pain, SOB, cough. Non ambulatory. Some purulence noted around bryant site this AM. Plan to remove bryant today with voiding trials.     ROS (Positive in Bold, otherwise negative)  Pain Scale: 3 /10   Constitutional: fever, chills, night sweats, weakness  CV: chest pain, edema, palpitations  Resp: SOB, cough, sputum production  GI: changes in appetite, NVDC, pain, melena, hematochezia, GERD, hematemesis  : Dysuria, hematuria, urinary urgency, frequency  MSK:  arthralgia/myalgia, joint swelling  SKIN: rashes, pruritis, petechiae   Neuro/Psych, FNDm anxiety, depression      OBJECTIVE:     Vitals:  Temp:  [97.5 °F (36.4 °C)-98.9 °F (37.2 °C)]   Pulse:  [73-86]   Resp:  [13-20]   BP: (120-166)/(70-85)   SpO2:  [93 %-98 %]      I & O (Last 24H):     Intake/Output Summary (Last 24 hours) at 3/7/2020 1403  Last data filed at 3/7/2020 1100  Gross per 24 hour   Intake 240 ml   Output 2000 ml   Net -1760 ml       GEN: AA male in no acute distress. Nontoxic. Resting in bed. Cooperative. Appears comfortable.  HEENT: NCAT. PERRL. EOMI. Conjunctivae/corneas clear, sclera Anicteric.  CVS: RRR. Normal s1 s2 no murmur, click, rub or gallop  LUNG: CTAB. Normal respiratory effort. Anterior exam with no wheezes, rhonchi, or crackles.  ABD: Normoactive BS, soft, NT, ND, no masses or organomegaly.  EXT: Joint swelling noted on multiple UE joints all improving. R arm swollen compared to L, improving. ROM gradually improving   : Rivera present with some purulence at insertion site noted (discussed with RN)  SKIN: color, texture, turgor normal. No rashes or lesions  NEURO: Alert, oriented x 4, grossly normal      All recent labs and imaging has been reviewed.     Recent Results (from the past 24 hour(s))   POCT glucose    Collection Time: 03/06/20  5:14 PM   Result Value Ref Range    POCT Glucose 179 (H) 70 - 110 mg/dL   Basic metabolic panel    Collection Time: 03/06/20  5:48 PM   Result Value Ref Range    Sodium 130 (L) 136 - 145 mmol/L    Potassium 5.5 (H) 3.5 - 5.1 mmol/L    Chloride 101 95 - 110 mmol/L    CO2 21 (L) 23 - 29 mmol/L    Glucose 199 (H) 70 - 110 mg/dL    BUN, Bld 48 (H) 8 - 23 mg/dL    Creatinine 1.2 0.5 - 1.4 mg/dL    Calcium 9.2 8.7 - 10.5 mg/dL    Anion Gap 8 8 - 16 mmol/L    eGFR if African American >60.0 >60 mL/min/1.73 m^2    eGFR if non African American >60.0 >60 mL/min/1.73 m^2   Magnesium    Collection Time: 03/06/20  5:48 PM   Result Value Ref Range    Magnesium 1.9  1.6 - 2.6 mg/dL   Phosphorus    Collection Time: 03/06/20  5:48 PM   Result Value Ref Range    Phosphorus 3.2 2.7 - 4.5 mg/dL   CBC auto differential    Collection Time: 03/06/20  5:48 PM   Result Value Ref Range    WBC 51.71 (HH) 3.90 - 12.70 K/uL    RBC 3.80 (L) 4.60 - 6.20 M/uL    Hemoglobin 10.6 (L) 14.0 - 18.0 g/dL    Hematocrit 33.5 (L) 40.0 - 54.0 %    Mean Corpuscular Volume 88 82 - 98 fL    Mean Corpuscular Hemoglobin 27.9 27.0 - 31.0 pg    Mean Corpuscular Hemoglobin Conc 31.6 (L) 32.0 - 36.0 g/dL    RDW 15.1 (H) 11.5 - 14.5 %    Platelets 357 (H) 150 - 350 K/uL    MPV 10.9 9.2 - 12.9 fL    Immature Granulocytes CANCELED 0.0 - 0.5 %    Immature Grans (Abs) CANCELED 0.00 - 0.04 K/uL    nRBC 0 0 /100 WBC    Gran% 90.0 (H) 38.0 - 73.0 %    Lymph% 5.0 (L) 18.0 - 48.0 %    Mono% 3.0 (L) 4.0 - 15.0 %    Eosinophil% 0.0 0.0 - 8.0 %    Basophil% 0.0 0.0 - 1.9 %    Bands 1.0 %    Myelocytes 1.0 %    Platelet Estimate Increased (A)     Aniso Slight     Hypo Occasional     Dohle Bodies Present     Toxic Granulation Present     Differential Method Manual    Pathologist Interpretation Differential    Collection Time: 03/06/20  6:38 PM   Result Value Ref Range    Pathologist Review Review required    CBC auto differential    Collection Time: 03/06/20  6:38 PM   Result Value Ref Range    WBC 49.64 (H) 3.90 - 12.70 K/uL    RBC 3.70 (L) 4.60 - 6.20 M/uL    Hemoglobin 10.3 (L) 14.0 - 18.0 g/dL    Hematocrit 32.4 (L) 40.0 - 54.0 %    Mean Corpuscular Volume 88 82 - 98 fL    Mean Corpuscular Hemoglobin 27.8 27.0 - 31.0 pg    Mean Corpuscular Hemoglobin Conc 31.8 (L) 32.0 - 36.0 g/dL    RDW 15.0 (H) 11.5 - 14.5 %    Platelets 408 (H) 150 - 350 K/uL    MPV 11.3 9.2 - 12.9 fL    Immature Granulocytes CANCELED 0.0 - 0.5 %    Immature Grans (Abs) CANCELED 0.00 - 0.04 K/uL    nRBC 0 0 /100 WBC    Gran% 90.0 (H) 38.0 - 73.0 %    Lymph% 2.0 (L) 18.0 - 48.0 %    Mono% 2.0 (L) 4.0 - 15.0 %    Eosinophil% 0.0 0.0 - 8.0 %    Basophil%  0.0 0.0 - 1.9 %    Bands 3.0 %    Metamyelocytes 1.0 %    Myelocytes 2.0 %    Platelet Estimate Increased (A)     Aniso Slight     Poik CANCELED     Poly Occasional     Hypo Moderate     Dohle Bodies Present     Toxic Granulation Present     Vacuolated Granulocytes Present     Differential Method Manual    POCT glucose    Collection Time: 03/06/20  9:40 PM   Result Value Ref Range    POCT Glucose 182 (H) 70 - 110 mg/dL   Basic Metabolic Panel (BMP)    Collection Time: 03/07/20  4:31 AM   Result Value Ref Range    Sodium 132 (L) 136 - 145 mmol/L    Potassium 5.2 (H) 3.5 - 5.1 mmol/L    Chloride 102 95 - 110 mmol/L    CO2 23 23 - 29 mmol/L    Glucose 114 (H) 70 - 110 mg/dL    BUN, Bld 41 (H) 8 - 23 mg/dL    Creatinine 1.1 0.5 - 1.4 mg/dL    Calcium 8.9 8.7 - 10.5 mg/dL    Anion Gap 7 (L) 8 - 16 mmol/L    eGFR if African American >60.0 >60 mL/min/1.73 m^2    eGFR if non African American >60.0 >60 mL/min/1.73 m^2   Magnesium    Collection Time: 03/07/20  4:31 AM   Result Value Ref Range    Magnesium 1.7 1.6 - 2.6 mg/dL   Phosphorus    Collection Time: 03/07/20  4:31 AM   Result Value Ref Range    Phosphorus 2.8 2.7 - 4.5 mg/dL   CBC with Automated Differential    Collection Time: 03/07/20  4:31 AM   Result Value Ref Range    WBC 43.33 (H) 3.90 - 12.70 K/uL    RBC 3.56 (L) 4.60 - 6.20 M/uL    Hemoglobin 10.0 (L) 14.0 - 18.0 g/dL    Hematocrit 30.8 (L) 40.0 - 54.0 %    Mean Corpuscular Volume 87 82 - 98 fL    Mean Corpuscular Hemoglobin 28.1 27.0 - 31.0 pg    Mean Corpuscular Hemoglobin Conc 32.5 32.0 - 36.0 g/dL    RDW 14.9 (H) 11.5 - 14.5 %    Platelets 390 (H) 150 - 350 K/uL    MPV 10.9 9.2 - 12.9 fL    Immature Granulocytes CANCELED 0.0 - 0.5 %    Immature Grans (Abs) CANCELED 0.00 - 0.04 K/uL    Lymph # CANCELED 1.0 - 4.8 K/uL    Mono # CANCELED 0.3 - 1.0 K/uL    Eos # CANCELED 0.0 - 0.5 K/uL    Baso # CANCELED 0.00 - 0.20 K/uL    nRBC 0 0 /100 WBC    Gran% 88.0 (H) 38.0 - 73.0 %    Lymph% 4.0 (L) 18.0 - 48.0 %     Mono% 4.0 4.0 - 15.0 %    Eosinophil% 0.0 0.0 - 8.0 %    Basophil% 0.0 0.0 - 1.9 %    Bands 3.0 %    Myelocytes 1.0 %    Platelet Estimate Increased (A)     Aniso Slight     Hypo Occasional     Differential Method Manual    Hemoglobin A1c    Collection Time: 03/07/20  4:31 AM   Result Value Ref Range    Hemoglobin A1C 6.2 (H) 4.0 - 5.6 %    Estimated Avg Glucose 131 68 - 131 mg/dL   Vancomycin, random    Collection Time: 03/07/20  4:31 AM   Result Value Ref Range    Vancomycin, Random 6.9 Not established ug/mL   POCT glucose    Collection Time: 03/07/20  8:53 AM   Result Value Ref Range    POCT Glucose 101 70 - 110 mg/dL   POCT glucose    Collection Time: 03/07/20 11:33 AM   Result Value Ref Range    POCT Glucose 164 (H) 70 - 110 mg/dL       Recent Labs   Lab 03/06/20  0800 03/06/20  1213 03/06/20  1714 03/06/20  2140 03/07/20  0853 03/07/20  1133   POCTGLUCOSE 110 221* 179* 182* 101 164*       Hemoglobin A1C   Date Value Ref Range Status   03/07/2020 6.2 (H) 4.0 - 5.6 % Final     Comment:     ADA Screening Guidelines:  5.7-6.4%  Consistent with prediabetes  >or=6.5%  Consistent with diabetes  High levels of fetal hemoglobin interfere with the HbA1C  assay. Heterozygous hemoglobin variants (HbS, HgC, etc)do  not significantly interfere with this assay.   However, presence of multiple variants may affect accuracy.     08/19/2019 5.6 4.0 - 5.6 % Final     Comment:     ADA Screening Guidelines:  5.7-6.4%  Consistent with prediabetes  >or=6.5%  Consistent with diabetes  High levels of fetal hemoglobin interfere with the HbA1C  assay. Heterozygous hemoglobin variants (HbS, HgC, etc)do  not significantly interfere with this assay.   However, presence of multiple variants may affect accuracy.     01/25/2019 5.6 4.0 - 5.6 % Final     Comment:     ADA Screening Guidelines:  5.7-6.4%  Consistent with prediabetes  >or=6.5%  Consistent with diabetes  High levels of fetal hemoglobin interfere with the HbA1C  assay. Heterozygous  hemoglobin variants (HbS, HgC, etc)do  not significantly interfere with this assay.   However, presence of multiple variants may affect accuracy.          Active Hospital Problems    Diagnosis  POA    *Leukocytosis [D72.829]  Yes    Urinary tract infection with hematuria [N39.0, R31.9]  Yes    Type 2 diabetes mellitus with hyperglycemia, with long-term current use of insulin [E11.65, Z79.4]  Not Applicable    Hyperkalemia [E87.5]  Yes    Hyponatremia [E87.1]  Yes    Pressure injury of buttock, unstageable [L89.300]  Yes    Acute idiopathic gout of multiple sites [M10.09]  Yes    Acute kidney injury superimposed on chronic kidney disease [N17.9, N18.9]  Yes    Transaminitis [R74.0]  Yes    Urinary retention [R33.9]  Yes    Decreased functional mobility and endurance [Z74.09]  Yes    COPD, moderate [J44.9]  Yes    Essential hypertension [I10]  Yes    History of CVA (cerebrovascular accident) [Z86.73]  Not Applicable    Morbid obesity with BMI of 45.0-49.9, adult [E66.01, Z68.42]  Not Applicable    Obstructive sleep apnea on CPAP: setting = 8 [G47.33, Z99.89]  Not Applicable      Resolved Hospital Problems   No resolved problems to display.          ASSESSMENT AND PLAN:     UTI  Significant leukocytosis  - Patient with recent admission for polyarticular gout (discharged on prednisone and colchicine) and urinary retention s/p recent bryant admitted for worsening leukocytosis of ~65.   - Admitted to hospital medicine for suspected UTI vs wound infection.   - Bryant exchanged in the ED.    - Started on bs-abx - vanc and zosyn  - Urine with GNR >100K, pending final, d/c vancomycin  - Wound care seen patient, and wound does not look infected  - Remains HDS and afebrile.   - Leucocytosis resolving.   - Remains on BS-abx.   - Plan to remove bryant and attempt voiding trials.       Polyarticular Gout  - Continue steroid taper and colchicine as per rheum per last admit  - Supportive care     Urinary Retention  -  Patient retaining urine on last admit s/p bryant placement  - start tamsulosin   - Bryant removed 2/29, but failed voiding trail and was replaced by overnight RN. No notes in chart or amount retained charted  - Discussed with urology and they rec keeping for now as he failed voiding trials, outpatient fu at urology clinic for voiding trials.   - D/c-ed patient to rehab with bryant, however likely due to poor hygiene, now UTI  - Bryant was exchanged this admission in the ED  - Plan to remove bryant today with voiding trials    Moisture associated dermatitis in the gluteal region  - present on admission  - wound care seen patient   - wound care recs -Nursing to cleanse sacrum with cleansing wipes and apply Triad ointment BID and prn cleaning to sacrum. Triad ointment provides a hydrophilic environment to promote healing of exposed tissue and prevents breakdown of intact skin.  -wound care recs- Nursing to continue pressure prevention interventions as directed. Please assist pt with turning and repositioning every 2 hours with the use of a wedge/pillows, float bilateral heels with pillows or heel lift boots to alleviate pressure off bed surface, and apply border foams as needed to protect bony prominences.    Generalized weakness  - see above   - PT OT, rehab on discharge     Left carpal tunnel syndrome  - recent left carpal tunnel release  - incisions clean dry and intact without evidence of acute infection  - ortho seen patient on last admission s/p wrist aspiration noting gout  - treat gout as above     COPD, moderate  - Duonebs prn     Obstructive sleep apnea on CPAP  - setting = 8  - CPAP HS       Prophylaxis- Lovenox   Code Status- Full Code   Discharge plan and follow up - back to rehab once stable    Guilherme Anderson MD  Hospital Medicine Staff  Pager [phone]

## 2020-03-07 NOTE — PROGRESS NOTES
Pharmacokinetic Assessment Follow Up: IV Vancomycin    Vancomycin serum concentration assessment(s):    · The random level was drawn correctly and can be used to guide therapy at this time. The measurement is below the desired definitive target range of 10 to 15 mcg/mL.  · Patient's renal function has significantly improved    Vancomycin Regimen Plan:    · Discontinue pulse dose and initiate scheduled dosing due to improved creatinine clearance   · Change regimen to Vancomycin 1500 mg IV every 12 hours with next serum trough concentration measured prior to the fourth dose on 3/8    Drug levels (last 3 results):  Recent Labs   Lab Result Units 03/07/20  0431   Vancomycin, Random ug/mL 6.9       Pharmacy will continue to follow and monitor vancomycin.    Please contact pharmacy at extension [phone] for questions regarding this assessment.    Thank you for the consult,   Jarod Dumont       Patient brief summary:  Bj Pate Jr. is a 70 y.o. male initiated on antimicrobial therapy with IV Vancomycin for treatment of skin & soft tissue infection      Drug Allergies:   Review of patient's allergies indicates:   Allergen Reactions    Tomato (solanum lycopersicum) Hives    Naproxen Hives    Shrimp Other (See Comments)       Actual Body Weight:   144.9 kg    Renal Function:   Estimated Creatinine Clearance: 91.3 mL/min (based on SCr of 1.1 mg/dL).,     Dialysis Method (if applicable):  N/A    CBC (last 72 hours):  Recent Labs   Lab Result Units 03/05/20  2245 03/06/20  1748 03/06/20  1838 03/07/20  0431   WBC K/uL 65.75* 51.71* 49.64* 43.33*   Hemoglobin g/dL 10.5* 10.6* 10.3* 10.0*   Hemoglobin A1C %  --   --   --  6.2*   Hematocrit % 32.4* 33.5* 32.4* 30.8*   Platelets K/uL 364* 357* 408* 390*   Gran% % 90.5* 90.0* 90.0* 88.0*   Lymph% % 1.0* 5.0* 2.0* 4.0*   Mono% % 4.5 3.0* 2.0* 4.0   Eosinophil% % 0.0 0.0 0.0 0.0   Basophil% % 0.0 0.0 0.0 0.0   Differential Method  Manual Manual Manual Manual       Metabolic Panel  (last 72 hours):  Recent Labs   Lab Result Units 03/05/20  2245 03/06/20  0007 03/06/20  1748 03/07/20  0431   Sodium mmol/L 131*  --  130* 132*   Potassium mmol/L 5.2*  --  5.5* 5.2*   Chloride mmol/L 100  --  101 102   CO2 mmol/L 22*  --  21* 23   Glucose mg/dL 166*  --  199* 114*   Glucose, UA   --  Negative  --   --    BUN, Bld mg/dL 62*  --  48* 41*   Creatinine mg/dL 1.6*  --  1.2 1.1   Albumin g/dL 2.0*  --   --   --    Total Bilirubin mg/dL 0.8  --   --   --    Alkaline Phosphatase U/L 124  --   --   --    AST U/L 41*  --   --   --    ALT U/L 167*  --   --   --    Magnesium mg/dL  --   --  1.9 1.7   Phosphorus mg/dL  --   --  3.2 2.8       Vancomycin Administrations:  vancomycin given in the last 96 hours                   vancomycin 1.5 g in dextrose 5 % 250 mL IVPB (ready to mix) (mg) 1,500 mg New Bag 03/06/20 0826                Microbiologic Results:  Microbiology Results (last 7 days)     Procedure Component Value Units Date/Time    Blood culture x two cultures. Draw prior to antibiotics. [[id number]] Collected:  03/05/20 2245    Order Status:  Completed Specimen:  Blood from Peripheral, Forearm, Left Updated:  03/07/20 0613     Blood Culture, Routine No Growth to date      No Growth to date    Narrative:       Aerobic and anaerobic    Blood culture x two cultures. Draw prior to antibiotics. [[id number]] Collected:  03/05/20 2315    Order Status:  Completed Specimen:  Blood from Peripheral, Forearm, Left Updated:  03/07/20 0613     Blood Culture, Routine No Growth to date      No Growth to date    Narrative:       Aerobic and anaerobic    Urine culture [[id number]]  (Abnormal) Collected:  03/06/20 0007    Order Status:  Completed Specimen:  Urine Updated:  03/06/20 2048     Urine Culture, Routine GRAM NEGATIVE RICARDO  >100,000 cfu/ml  Identification and susceptibility pending      Narrative:       Preferred Collection Type->Urine, Clean Catch    Influenza A & B by Molecular [[id number]] Collected:  03/06/20  0445    Order Status:  Completed Specimen:  Nasopharyngeal Swab Updated:  03/06/20 0518     Influenza A, Molecular Negative     Influenza B, Molecular Negative     Flu A & B Source Nasal swab

## 2020-03-08 LAB
ANION GAP SERPL CALC-SCNC: 8 MMOL/L (ref 8–16)
ANISOCYTOSIS BLD QL SMEAR: SLIGHT
BASOPHILS # BLD AUTO: ABNORMAL K/UL (ref 0–0.2)
BASOPHILS NFR BLD: 0 % (ref 0–1.9)
BUN SERPL-MCNC: 32 MG/DL (ref 8–23)
CALCIUM SERPL-MCNC: 9 MG/DL (ref 8.7–10.5)
CHLORIDE SERPL-SCNC: 99 MMOL/L (ref 95–110)
CO2 SERPL-SCNC: 25 MMOL/L (ref 23–29)
CREAT SERPL-MCNC: 1.1 MG/DL (ref 0.5–1.4)
DIFFERENTIAL METHOD: ABNORMAL
EOSINOPHIL # BLD AUTO: ABNORMAL K/UL (ref 0–0.5)
EOSINOPHIL NFR BLD: 0 % (ref 0–8)
ERYTHROCYTE [DISTWIDTH] IN BLOOD BY AUTOMATED COUNT: 14.9 % (ref 11.5–14.5)
EST. GFR  (AFRICAN AMERICAN): >60 ML/MIN/1.73 M^2
EST. GFR  (NON AFRICAN AMERICAN): >60 ML/MIN/1.73 M^2
GLUCOSE SERPL-MCNC: 114 MG/DL (ref 70–110)
HCT VFR BLD AUTO: 33.1 % (ref 40–54)
HGB BLD-MCNC: 10.5 G/DL (ref 14–18)
IMM GRANULOCYTES # BLD AUTO: ABNORMAL K/UL (ref 0–0.04)
IMM GRANULOCYTES NFR BLD AUTO: ABNORMAL % (ref 0–0.5)
LYMPHOCYTES # BLD AUTO: ABNORMAL K/UL (ref 1–4.8)
LYMPHOCYTES NFR BLD: 3 % (ref 18–48)
MAGNESIUM SERPL-MCNC: 1.7 MG/DL (ref 1.6–2.6)
MCH RBC QN AUTO: 27.5 PG (ref 27–31)
MCHC RBC AUTO-ENTMCNC: 31.7 G/DL (ref 32–36)
MCV RBC AUTO: 87 FL (ref 82–98)
MONOCYTES # BLD AUTO: ABNORMAL K/UL (ref 0.3–1)
MONOCYTES NFR BLD: 4 % (ref 4–15)
MYELOCYTES NFR BLD MANUAL: 3 %
NEUTROPHILS NFR BLD: 90 % (ref 38–73)
NRBC BLD-RTO: 0 /100 WBC
OVALOCYTES BLD QL SMEAR: ABNORMAL
PHOSPHATE SERPL-MCNC: 3.2 MG/DL (ref 2.7–4.5)
PLATELET # BLD AUTO: 438 K/UL (ref 150–350)
PLATELET BLD QL SMEAR: ABNORMAL
PMV BLD AUTO: 10.7 FL (ref 9.2–12.9)
POCT GLUCOSE: 169 MG/DL (ref 70–110)
POCT GLUCOSE: 188 MG/DL (ref 70–110)
POCT GLUCOSE: 203 MG/DL (ref 70–110)
POCT GLUCOSE: 84 MG/DL (ref 70–110)
POIKILOCYTOSIS BLD QL SMEAR: SLIGHT
POTASSIUM SERPL-SCNC: 5.1 MMOL/L (ref 3.5–5.1)
RBC # BLD AUTO: 3.82 M/UL (ref 4.6–6.2)
SODIUM SERPL-SCNC: 132 MMOL/L (ref 136–145)
WBC # BLD AUTO: 32.29 K/UL (ref 3.9–12.7)

## 2020-03-08 PROCEDURE — C9399 UNCLASSIFIED DRUGS OR BIOLOG: HCPCS | Performed by: PHYSICIAN ASSISTANT

## 2020-03-08 PROCEDURE — 11000001 HC ACUTE MED/SURG PRIVATE ROOM

## 2020-03-08 PROCEDURE — 25000003 PHARM REV CODE 250: Performed by: INTERNAL MEDICINE

## 2020-03-08 PROCEDURE — 80048 BASIC METABOLIC PNL TOTAL CA: CPT

## 2020-03-08 PROCEDURE — 85027 COMPLETE CBC AUTOMATED: CPT

## 2020-03-08 PROCEDURE — 99900035 HC TECH TIME PER 15 MIN (STAT)

## 2020-03-08 PROCEDURE — 83735 ASSAY OF MAGNESIUM: CPT

## 2020-03-08 PROCEDURE — 63600175 PHARM REV CODE 636 W HCPCS: Performed by: PHYSICIAN ASSISTANT

## 2020-03-08 PROCEDURE — 85007 BL SMEAR W/DIFF WBC COUNT: CPT

## 2020-03-08 PROCEDURE — 94761 N-INVAS EAR/PLS OXIMETRY MLT: CPT

## 2020-03-08 PROCEDURE — 99231 PR SUBSEQUENT HOSPITAL CARE,LEVL I: ICD-10-PCS | Mod: ,,, | Performed by: INTERNAL MEDICINE

## 2020-03-08 PROCEDURE — 27000221 HC OXYGEN, UP TO 24 HOURS

## 2020-03-08 PROCEDURE — 99231 SBSQ HOSP IP/OBS SF/LOW 25: CPT | Mod: ,,, | Performed by: INTERNAL MEDICINE

## 2020-03-08 PROCEDURE — 25500020 PHARM REV CODE 255: Performed by: INTERNAL MEDICINE

## 2020-03-08 PROCEDURE — 25000003 PHARM REV CODE 250: Performed by: PHYSICIAN ASSISTANT

## 2020-03-08 PROCEDURE — 94660 CPAP INITIATION&MGMT: CPT

## 2020-03-08 PROCEDURE — 36415 COLL VENOUS BLD VENIPUNCTURE: CPT

## 2020-03-08 PROCEDURE — 84100 ASSAY OF PHOSPHORUS: CPT

## 2020-03-08 RX ADMIN — ACETAMINOPHEN 650 MG: 325 TABLET ORAL at 03:03

## 2020-03-08 RX ADMIN — ENOXAPARIN SODIUM 40 MG: 100 INJECTION SUBCUTANEOUS at 10:03

## 2020-03-08 RX ADMIN — DILTIAZEM HYDROCHLORIDE 240 MG: 120 CAPSULE, COATED, EXTENDED RELEASE ORAL at 10:03

## 2020-03-08 RX ADMIN — VITAMIN D, TAB 1000IU (100/BT) 1000 UNITS: 25 TAB at 10:03

## 2020-03-08 RX ADMIN — OXYBUTYNIN CHLORIDE 5 MG: 5 TABLET ORAL at 03:03

## 2020-03-08 RX ADMIN — POLYETHYLENE GLYCOL 3350 17 G: 17 POWDER, FOR SOLUTION ORAL at 10:03

## 2020-03-08 RX ADMIN — INSULIN DETEMIR 10 UNITS: 100 INJECTION, SOLUTION SUBCUTANEOUS at 10:03

## 2020-03-08 RX ADMIN — PREDNISONE 20 MG: 10 TABLET ORAL at 10:03

## 2020-03-08 RX ADMIN — ASPIRIN 81 MG: 81 TABLET, COATED ORAL at 10:03

## 2020-03-08 RX ADMIN — Medication 500 MG: at 10:03

## 2020-03-08 RX ADMIN — OXYBUTYNIN CHLORIDE 5 MG: 5 TABLET ORAL at 10:03

## 2020-03-08 RX ADMIN — CEFEPIME 1 G: 1 INJECTION, POWDER, FOR SOLUTION INTRAMUSCULAR; INTRAVENOUS at 03:03

## 2020-03-08 RX ADMIN — OXYCODONE HYDROCHLORIDE 10 MG: 10 TABLET ORAL at 09:03

## 2020-03-08 RX ADMIN — TAMSULOSIN HYDROCHLORIDE 0.4 MG: 0.4 CAPSULE ORAL at 10:03

## 2020-03-08 RX ADMIN — OXYBUTYNIN CHLORIDE 5 MG: 5 TABLET ORAL at 09:03

## 2020-03-08 RX ADMIN — CIPROFLOXACIN HYDROCHLORIDE 750 MG: 250 TABLET, FILM COATED ORAL at 10:03

## 2020-03-08 RX ADMIN — CIPROFLOXACIN HYDROCHLORIDE 750 MG: 250 TABLET, FILM COATED ORAL at 09:03

## 2020-03-08 RX ADMIN — INSULIN DETEMIR 10 UNITS: 100 INJECTION, SOLUTION SUBCUTANEOUS at 09:03

## 2020-03-08 RX ADMIN — IOHEXOL 100 ML: 350 INJECTION, SOLUTION INTRAVENOUS at 05:03

## 2020-03-08 RX ADMIN — ATORVASTATIN CALCIUM 40 MG: 20 TABLET, FILM COATED ORAL at 10:03

## 2020-03-08 NOTE — PROGRESS NOTES
[facility] Hospital Medicine                                                                     Progress Note     Team: [facility] G Guilherme Anderson MD   Admit Date: 3/5/2020   Hospital Day: 2  GLORIA: 3/9/2020   Code status: Full Code   Principal Problem: Leukocytosis     SUMMARY:     Bj Pate Jr. is a 70M with CVA w/o residual deficit, COPD, HILARIO on CPAP, Gout, DDD of Lumbar, Cervical Radiculopathy, HTN, and recent admission for polyarticular gout (discharged on prednisone and colchicine) and urinary retention s/p recent bryant admitted for worsening leukocytosis of ~65. Admitted to hospital medicine for suspected UTI vs wound infection. Bryant exchanged in the ED.  Started on bs-abx. Urine with GNR >100K. Wound care seen patient, and wound does not look infected. Remains HDS and afebrile. Leucocytosis resolving. Remains on BS-abx. Bryant removed, and now voiding. Urine grew pseudomonas, changed cefepime to cipro BID for total 10 days. Discussed with ID and they rec CT A/P to ro pyelonephritis concerning his presenting wbc count.       SUBJECTIVE:     Pt was seen and examined at bedside. Pt had no acute events overnight, and no new complaints this morning. Pt remained hemodynamically stable and afebrile. No new complaints other than arthralgias, myalgia. Bryant removed yesterday wo difficulty. Tolerated well. Now voiding in diaper. Eating well.     ROS (Positive in Bold, otherwise negative)  Pain Scale: 3 /10   Constitutional: fever, chills, night sweats, weakness  CV: chest pain, edema, palpitations  Resp: SOB, cough, sputum production  GI: changes in appetite, NVDC, pain, melena, hematochezia, GERD, hematemesis  : Dysuria, hematuria, urinary urgency, frequency  MSK: arthralgia/myalgia, joint swelling  SKIN: rashes, pruritis, petechiae   Neuro/Psych, FNDm  anxiety, depression      OBJECTIVE:     Vitals:  Temp:  [96.6 °F (35.9 °C)-97.9 °F (36.6 °C)]   Pulse:  [58-79]   Resp:  [11-19]   BP: (109-134)/(59-73)   SpO2:  [95 %-100 %]      I & O (Last 24H):     Intake/Output Summary (Last 24 hours) at 3/8/2020 1153  Last data filed at 3/8/2020 1000  Gross per 24 hour   Intake 440 ml   Output --   Net 440 ml       GEN: AA male in no acute distress. Nontoxic. Resting in bed. Cooperative. Appears comfortable.  HEENT: NCAT. PERRL. EOMI. Conjunctivae/corneas clear, sclera Anicteric.  CVS: RRR. Normal s1 s2 no murmur, click, rub or gallop  LUNG: CTAB. Normal respiratory effort. Anterior exam with no wheezes, rhonchi, or crackles.  ABD: Normoactive BS, soft, NT, ND, no masses or organomegaly.  EXT: Joint swelling noted on multiple UE joints all improving. R arm swollen compared to L, improving. ROM gradually improving   SKIN: color, texture, turgor normal. No rashes or lesions  NEURO: Alert, oriented x 4, grossly normal      All recent labs and imaging has been reviewed.     Recent Results (from the past 24 hour(s))   POCT glucose    Collection Time: 03/07/20  5:13 PM   Result Value Ref Range    POCT Glucose 196 (H) 70 - 110 mg/dL   POCT glucose    Collection Time: 03/07/20  9:57 PM   Result Value Ref Range    POCT Glucose 176 (H) 70 - 110 mg/dL   Basic Metabolic Panel (BMP)    Collection Time: 03/08/20  4:53 AM   Result Value Ref Range    Sodium 132 (L) 136 - 145 mmol/L    Potassium 5.1 3.5 - 5.1 mmol/L    Chloride 99 95 - 110 mmol/L    CO2 25 23 - 29 mmol/L    Glucose 114 (H) 70 - 110 mg/dL    BUN, Bld 32 (H) 8 - 23 mg/dL    Creatinine 1.1 0.5 - 1.4 mg/dL    Calcium 9.0 8.7 - 10.5 mg/dL    Anion Gap 8 8 - 16 mmol/L    eGFR if African American >60.0 >60 mL/min/1.73 m^2    eGFR if non African American >60.0 >60 mL/min/1.73 m^2   Magnesium    Collection Time: 03/08/20  4:53 AM   Result Value Ref Range    Magnesium 1.7 1.6 - 2.6 mg/dL   Phosphorus    Collection Time: 03/08/20  4:53  AM   Result Value Ref Range    Phosphorus 3.2 2.7 - 4.5 mg/dL   CBC with Automated Differential    Collection Time: 03/08/20  4:53 AM   Result Value Ref Range    WBC 32.29 (H) 3.90 - 12.70 K/uL    RBC 3.82 (L) 4.60 - 6.20 M/uL    Hemoglobin 10.5 (L) 14.0 - 18.0 g/dL    Hematocrit 33.1 (L) 40.0 - 54.0 %    Mean Corpuscular Volume 87 82 - 98 fL    Mean Corpuscular Hemoglobin 27.5 27.0 - 31.0 pg    Mean Corpuscular Hemoglobin Conc 31.7 (L) 32.0 - 36.0 g/dL    RDW 14.9 (H) 11.5 - 14.5 %    Platelets 438 (H) 150 - 350 K/uL    MPV 10.7 9.2 - 12.9 fL    Immature Granulocytes CANCELED 0.0 - 0.5 %    Immature Grans (Abs) CANCELED 0.00 - 0.04 K/uL    Lymph # CANCELED 1.0 - 4.8 K/uL    Mono # CANCELED 0.3 - 1.0 K/uL    Eos # CANCELED 0.0 - 0.5 K/uL    Baso # CANCELED 0.00 - 0.20 K/uL    nRBC 0 0 /100 WBC    Gran% 90.0 (H) 38.0 - 73.0 %    Lymph% 3.0 (L) 18.0 - 48.0 %    Mono% 4.0 4.0 - 15.0 %    Eosinophil% 0.0 0.0 - 8.0 %    Basophil% 0.0 0.0 - 1.9 %    Myelocytes 3.0 %    Platelet Estimate Increased (A)     Aniso Slight     Poik Slight     Ovalocytes Occasional     Differential Method Manual    POCT glucose    Collection Time: 03/08/20  7:15 AM   Result Value Ref Range    POCT Glucose 84 70 - 110 mg/dL       Recent Labs   Lab 03/06/20  2140 03/07/20  0853 03/07/20  1133 03/07/20  1713 03/07/20  2157 03/08/20  0715   POCTGLUCOSE 182* 101 164* 196* 176* 84       Hemoglobin A1C   Date Value Ref Range Status   03/07/2020 6.2 (H) 4.0 - 5.6 % Final     Comment:     ADA Screening Guidelines:  5.7-6.4%  Consistent with prediabetes  >or=6.5%  Consistent with diabetes  High levels of fetal hemoglobin interfere with the HbA1C  assay. Heterozygous hemoglobin variants (HbS, HgC, etc)do  not significantly interfere with this assay.   However, presence of multiple variants may affect accuracy.     08/19/2019 5.6 4.0 - 5.6 % Final     Comment:     ADA Screening Guidelines:  5.7-6.4%  Consistent with prediabetes  >or=6.5%  Consistent with  diabetes  High levels of fetal hemoglobin interfere with the HbA1C  assay. Heterozygous hemoglobin variants (HbS, HgC, etc)do  not significantly interfere with this assay.   However, presence of multiple variants may affect accuracy.     01/25/2019 5.6 4.0 - 5.6 % Final     Comment:     ADA Screening Guidelines:  5.7-6.4%  Consistent with prediabetes  >or=6.5%  Consistent with diabetes  High levels of fetal hemoglobin interfere with the HbA1C  assay. Heterozygous hemoglobin variants (HbS, HgC, etc)do  not significantly interfere with this assay.   However, presence of multiple variants may affect accuracy.          Active Hospital Problems    Diagnosis  POA    *Leukocytosis [D72.829]  Yes    Urinary tract infection with hematuria [N39.0, R31.9]  Yes    Type 2 diabetes mellitus with hyperglycemia, with long-term current use of insulin [E11.65, Z79.4]  Not Applicable    Hyperkalemia [E87.5]  Yes    Hyponatremia [E87.1]  Yes    Pressure injury of buttock, unstageable [L89.300]  Yes    Acute idiopathic gout of multiple sites [M10.09]  Yes    Acute kidney injury superimposed on chronic kidney disease [N17.9, N18.9]  Yes    Transaminitis [R74.0]  Yes    Urinary retention [R33.9]  Yes    Decreased functional mobility and endurance [Z74.09]  Yes    COPD, moderate [J44.9]  Yes    Essential hypertension [I10]  Yes    History of CVA (cerebrovascular accident) [Z86.73]  Not Applicable    Morbid obesity with BMI of 45.0-49.9, adult [E66.01, Z68.42]  Not Applicable    Obstructive sleep apnea on CPAP: setting = 8 [G47.33, Z99.89]  Not Applicable      Resolved Hospital Problems   No resolved problems to display.          ASSESSMENT AND PLAN:     Presumed pyelonephritis  Pseudomonas UTI  Significant leukocytosis  - Patient with recent admission for polyarticular gout (discharged on prednisone and colchicine) and urinary retention s/p recent bryant admitted for worsening leukocytosis of ~65.   - Admitted to hospital  medicine for suspected UTI vs wound infection.   - Bryant exchanged in the ED. And urine clean catch as per ED notes.  - Started on bs-abx - vanc and cefepime  - Wound care seen patient and personally seen wound, and wound does not look infected  - Urine with GNR >100K, pending final, d/c vancomycin, now growing pseudomonas  - Abx switched to oral cipro for total 10 days  - ID rec CT AP to ro pyelonephritis   - Bryant now removed  - Remains HDS and afebrile.   - Leucocytosis resolving.      Polyarticular Gout  - Continue steroid taper and colchicine as per rheum per last admit  - Supportive care     Urinary Retention  - Patient retaining urine on last admit s/p bryant placement  - start tamsulosin   - Bryant removed 2/29, but failed voiding trail and was replaced by overnight RN. No notes in chart or amount retained charted  - Discussed with urology and they rec keeping for now as he failed voiding trials, outpatient fu at urology clinic for voiding trials.   - D/c-ed patient to rehab with bryant, however likely due to poor hygiene, now UTI  - Bryant was exchanged this admission in the ED  - Bryant removed, and patient voiding fine    Moisture associated dermatitis in the gluteal region  - present on admission  - wound care seen patient   - wound care recs -Nursing to cleanse sacrum with cleansing wipes and apply Triad ointment BID and prn cleaning to sacrum. Triad ointment provides a hydrophilic environment to promote healing of exposed tissue and prevents breakdown of intact skin.  -wound care recs- Nursing to continue pressure prevention interventions as directed. Please assist pt with turning and repositioning every 2 hours with the use of a wedge/pillows, float bilateral heels with pillows or heel lift boots to alleviate pressure off bed surface, and apply border foams as needed to protect bony prominences.    Generalized weakness  - see above   - PT OT, rehab on discharge     Left carpal tunnel syndrome  - recent  left carpal tunnel release  - incisions clean dry and intact without evidence of acute infection  - ortho seen patient on last admission s/p wrist aspiration noting gout  - treat gout as above     COPD, moderate  - Duonebs prn     Obstructive sleep apnea on CPAP  - setting = 8  - CPAP HS       Prophylaxis- Lovenox   Code Status- Full Code   Discharge plan and follow up - back to rehab once stable    Guilherme Anderson MD  Hospital Medicine Staff  Pager [phone]

## 2020-03-08 NOTE — PLAN OF CARE
Problem: Wound  Goal: Optimal Wound Healing  Outcome: Ongoing, Progressing     Problem: Fall Injury Risk  Goal: Absence of Fall and Fall-Related Injury  Outcome: Ongoing, Progressing     Problem: Infection  Goal: Infection Symptom Resolution  Outcome: Ongoing, Progressing     Problem: Adult Inpatient Plan of Care  Goal: Plan of Care Review  Outcome: Ongoing, Progressing  Goal: Patient-Specific Goal (Individualization)  Outcome: Ongoing, Progressing  Goal: Absence of Hospital-Acquired Illness or Injury  Outcome: Ongoing, Progressing  Goal: Optimal Comfort and Wellbeing  Outcome: Ongoing, Progressing  Goal: Readiness for Transition of Care  Outcome: Ongoing, Progressing  Goal: Rounds/Family Conference  Outcome: Ongoing, Progressing     Problem: Diabetes Comorbidity  Goal: Blood Glucose Level Within Desired Range  Outcome: Ongoing, Progressing     Problem: Skin Injury Risk Increased  Goal: Skin Health and Integrity  Outcome: Ongoing, Progressing     AAO x4. VS stable. PRN tylenol administered, relief noted. No falls or injuries. Bed low, wheels locked. Call light within reach. Will continue to monitor pt.

## 2020-03-09 ENCOUNTER — CLINICAL SUPPORT (OUTPATIENT)
Dept: CARDIOLOGY | Facility: CLINIC | Age: 70
DRG: 699 | End: 2020-03-09
Attending: INTERNAL MEDICINE
Payer: MEDICARE

## 2020-03-09 LAB
ANION GAP SERPL CALC-SCNC: 10 MMOL/L (ref 8–16)
ANION GAP SERPL CALC-SCNC: 8 MMOL/L (ref 8–16)
ANISOCYTOSIS BLD QL SMEAR: SLIGHT
BASO STIPL BLD QL SMEAR: ABNORMAL
BASOPHILS NFR BLD: 0 % (ref 0–1.9)
BUN SERPL-MCNC: 27 MG/DL (ref 8–23)
BUN SERPL-MCNC: 27 MG/DL (ref 8–23)
CALCIUM SERPL-MCNC: 8.9 MG/DL (ref 8.7–10.5)
CALCIUM SERPL-MCNC: 9.1 MG/DL (ref 8.7–10.5)
CHLORIDE SERPL-SCNC: 97 MMOL/L (ref 95–110)
CHLORIDE SERPL-SCNC: 99 MMOL/L (ref 95–110)
CO2 SERPL-SCNC: 20 MMOL/L (ref 23–29)
CO2 SERPL-SCNC: 25 MMOL/L (ref 23–29)
CREAT SERPL-MCNC: 1 MG/DL (ref 0.5–1.4)
CREAT SERPL-MCNC: 1.1 MG/DL (ref 0.5–1.4)
DIFFERENTIAL METHOD: ABNORMAL
EOSINOPHIL NFR BLD: 1 % (ref 0–8)
ERYTHROCYTE [DISTWIDTH] IN BLOOD BY AUTOMATED COUNT: 14.8 % (ref 11.5–14.5)
EST. GFR  (AFRICAN AMERICAN): >60 ML/MIN/1.73 M^2
EST. GFR  (AFRICAN AMERICAN): >60 ML/MIN/1.73 M^2
EST. GFR  (NON AFRICAN AMERICAN): >60 ML/MIN/1.73 M^2
EST. GFR  (NON AFRICAN AMERICAN): >60 ML/MIN/1.73 M^2
GIANT PLATELETS BLD QL SMEAR: PRESENT
GLUCOSE SERPL-MCNC: 108 MG/DL (ref 70–110)
GLUCOSE SERPL-MCNC: 113 MG/DL (ref 70–110)
HCT VFR BLD AUTO: 34.7 % (ref 40–54)
HGB BLD-MCNC: 11.1 G/DL (ref 14–18)
IMM GRANULOCYTES # BLD AUTO: ABNORMAL K/UL (ref 0–0.04)
IMM GRANULOCYTES NFR BLD AUTO: ABNORMAL % (ref 0–0.5)
LYMPHOCYTES NFR BLD: 10 % (ref 18–48)
MAGNESIUM SERPL-MCNC: 1.5 MG/DL (ref 1.6–2.6)
MCH RBC QN AUTO: 28 PG (ref 27–31)
MCHC RBC AUTO-ENTMCNC: 32 G/DL (ref 32–36)
MCV RBC AUTO: 88 FL (ref 82–98)
METAMYELOCYTES NFR BLD MANUAL: 1 %
MONOCYTES NFR BLD: 8 % (ref 4–15)
MYELOCYTES NFR BLD MANUAL: 1 %
NEUTROPHILS NFR BLD: 76 % (ref 38–73)
NEUTS BAND NFR BLD MANUAL: 3 %
NRBC BLD-RTO: 0 /100 WBC
PATH REV BLD -IMP: NORMAL
PHOSPHATE SERPL-MCNC: 3.4 MG/DL (ref 2.7–4.5)
PLATELET # BLD AUTO: 447 K/UL (ref 150–350)
PLATELET BLD QL SMEAR: ABNORMAL
PMV BLD AUTO: 10.4 FL (ref 9.2–12.9)
POCT GLUCOSE: 108 MG/DL (ref 70–110)
POCT GLUCOSE: 161 MG/DL (ref 70–110)
POCT GLUCOSE: 207 MG/DL (ref 70–110)
POLYCHROMASIA BLD QL SMEAR: ABNORMAL
POTASSIUM SERPL-SCNC: 5 MMOL/L (ref 3.5–5.1)
POTASSIUM SERPL-SCNC: 5.5 MMOL/L (ref 3.5–5.1)
RBC # BLD AUTO: 3.96 M/UL (ref 4.6–6.2)
SODIUM SERPL-SCNC: 129 MMOL/L (ref 136–145)
SODIUM SERPL-SCNC: 130 MMOL/L (ref 136–145)
WBC # BLD AUTO: 26.4 K/UL (ref 3.9–12.7)

## 2020-03-09 PROCEDURE — 99222 1ST HOSP IP/OBS MODERATE 55: CPT | Mod: ,,, | Performed by: NURSE PRACTITIONER

## 2020-03-09 PROCEDURE — 97110 THERAPEUTIC EXERCISES: CPT | Mod: CQ

## 2020-03-09 PROCEDURE — 85027 COMPLETE CBC AUTOMATED: CPT

## 2020-03-09 PROCEDURE — 80048 BASIC METABOLIC PNL TOTAL CA: CPT

## 2020-03-09 PROCEDURE — 25000003 PHARM REV CODE 250: Performed by: PHYSICIAN ASSISTANT

## 2020-03-09 PROCEDURE — 36415 COLL VENOUS BLD VENIPUNCTURE: CPT

## 2020-03-09 PROCEDURE — 99231 SBSQ HOSP IP/OBS SF/LOW 25: CPT | Mod: ,,, | Performed by: INTERNAL MEDICINE

## 2020-03-09 PROCEDURE — 99900035 HC TECH TIME PER 15 MIN (STAT)

## 2020-03-09 PROCEDURE — 11000001 HC ACUTE MED/SURG PRIVATE ROOM

## 2020-03-09 PROCEDURE — 83735 ASSAY OF MAGNESIUM: CPT

## 2020-03-09 PROCEDURE — 93971 EXTREMITY STUDY: CPT | Mod: RT

## 2020-03-09 PROCEDURE — 63600175 PHARM REV CODE 636 W HCPCS: Performed by: INTERNAL MEDICINE

## 2020-03-09 PROCEDURE — 97530 THERAPEUTIC ACTIVITIES: CPT | Mod: CQ

## 2020-03-09 PROCEDURE — 97535 SELF CARE MNGMENT TRAINING: CPT

## 2020-03-09 PROCEDURE — 93971 CV US DOPPLER VENOUS ARM RIGHT (CUPID ONLY): ICD-10-PCS | Mod: 26,RT,, | Performed by: INTERNAL MEDICINE

## 2020-03-09 PROCEDURE — 99231 PR SUBSEQUENT HOSPITAL CARE,LEVL I: ICD-10-PCS | Mod: ,,, | Performed by: INTERNAL MEDICINE

## 2020-03-09 PROCEDURE — 94660 CPAP INITIATION&MGMT: CPT

## 2020-03-09 PROCEDURE — 80048 BASIC METABOLIC PNL TOTAL CA: CPT | Mod: 91

## 2020-03-09 PROCEDURE — 84100 ASSAY OF PHOSPHORUS: CPT

## 2020-03-09 PROCEDURE — 85007 BL SMEAR W/DIFF WBC COUNT: CPT

## 2020-03-09 PROCEDURE — 25000003 PHARM REV CODE 250: Performed by: INTERNAL MEDICINE

## 2020-03-09 PROCEDURE — 63600175 PHARM REV CODE 636 W HCPCS: Performed by: PHYSICIAN ASSISTANT

## 2020-03-09 PROCEDURE — 99222 PR INITIAL HOSPITAL CARE,LEVL II: ICD-10-PCS | Mod: ,,, | Performed by: NURSE PRACTITIONER

## 2020-03-09 PROCEDURE — 94761 N-INVAS EAR/PLS OXIMETRY MLT: CPT

## 2020-03-09 PROCEDURE — 93971 EXTREMITY STUDY: CPT | Mod: 26,RT,, | Performed by: INTERNAL MEDICINE

## 2020-03-09 RX ORDER — PREDNISONE 10 MG/1
10 TABLET ORAL DAILY
Status: DISCONTINUED | OUTPATIENT
Start: 2020-03-10 | End: 2020-03-10 | Stop reason: HOSPADM

## 2020-03-09 RX ORDER — POLYETHYLENE GLYCOL 3350 17 G/17G
17 POWDER, FOR SOLUTION ORAL DAILY PRN
Status: DISCONTINUED | OUTPATIENT
Start: 2020-03-09 | End: 2020-03-10 | Stop reason: HOSPADM

## 2020-03-09 RX ORDER — LISINOPRIL 10 MG/1
10 TABLET ORAL DAILY
Status: DISCONTINUED | OUTPATIENT
Start: 2020-03-10 | End: 2020-03-10

## 2020-03-09 RX ADMIN — INSULIN DETEMIR 10 UNITS: 100 INJECTION, SOLUTION SUBCUTANEOUS at 10:03

## 2020-03-09 RX ADMIN — ENOXAPARIN SODIUM 40 MG: 100 INJECTION SUBCUTANEOUS at 10:03

## 2020-03-09 RX ADMIN — CIPROFLOXACIN HYDROCHLORIDE 750 MG: 250 TABLET, FILM COATED ORAL at 09:03

## 2020-03-09 RX ADMIN — MAGNESIUM SULFATE HEPTAHYDRATE 3 G: 500 INJECTION, SOLUTION INTRAMUSCULAR; INTRAVENOUS at 10:03

## 2020-03-09 RX ADMIN — INSULIN ASPART 1 UNITS: 100 INJECTION, SOLUTION INTRAVENOUS; SUBCUTANEOUS at 09:03

## 2020-03-09 RX ADMIN — ATORVASTATIN CALCIUM 40 MG: 20 TABLET, FILM COATED ORAL at 10:03

## 2020-03-09 RX ADMIN — OXYBUTYNIN CHLORIDE 5 MG: 5 TABLET ORAL at 10:03

## 2020-03-09 RX ADMIN — PREDNISONE 20 MG: 10 TABLET ORAL at 10:03

## 2020-03-09 RX ADMIN — OXYBUTYNIN CHLORIDE 5 MG: 5 TABLET ORAL at 04:03

## 2020-03-09 RX ADMIN — ENOXAPARIN SODIUM 40 MG: 100 INJECTION SUBCUTANEOUS at 09:03

## 2020-03-09 RX ADMIN — TAMSULOSIN HYDROCHLORIDE 0.4 MG: 0.4 CAPSULE ORAL at 10:03

## 2020-03-09 RX ADMIN — ASPIRIN 81 MG: 81 TABLET, COATED ORAL at 10:03

## 2020-03-09 RX ADMIN — CIPROFLOXACIN HYDROCHLORIDE 750 MG: 250 TABLET, FILM COATED ORAL at 10:03

## 2020-03-09 RX ADMIN — OXYBUTYNIN CHLORIDE 5 MG: 5 TABLET ORAL at 09:03

## 2020-03-09 RX ADMIN — VITAMIN D, TAB 1000IU (100/BT) 1000 UNITS: 25 TAB at 10:03

## 2020-03-09 RX ADMIN — Medication 500 MG: at 10:03

## 2020-03-09 RX ADMIN — DILTIAZEM HYDROCHLORIDE 240 MG: 120 CAPSULE, COATED, EXTENDED RELEASE ORAL at 10:03

## 2020-03-09 RX ADMIN — INSULIN DETEMIR 10 UNITS: 100 INJECTION, SOLUTION SUBCUTANEOUS at 09:03

## 2020-03-09 NOTE — PT/OT/SLP PROGRESS
Occupational Therapy   Treatment    Name: Bj Pate Jr.  MRN: [id number]  Admitting Diagnosis:  Leukocytosis       Recommendations:     Discharge Recommendations: rehabilitation facility  Discharge Equipment Recommendations:  walker, rolling  Barriers to discharge:  Other (Comment)(increased need for assit)    Assessment:     Bj Pate Jr. is a 70 y.o. male with a medical diagnosis of Leukocytosis.  He presents with decline in ADLs and functional mobility. Pt is pleasant and eager to participate today. He reported 7/10 pain with current position in bed which was completely alleviated by getting up to chair.  Pt continues to make progress and would benefit from continued OT services in the acute care setting.  Performance deficits affecting function are weakness, decreased upper extremity function, decreased lower extremity function, impaired coordination, impaired self care skills, impaired functional mobilty, impaired cardiopulmonary response to activity.     Rehab Prognosis:  Good; patient would benefit from acute skilled OT services to address these deficits and reach maximum level of function.       Plan:     Patient to be seen 4 x/week to address the above listed problems via self-care/home management, therapeutic activities, therapeutic exercises, neuromuscular re-education  · Plan of Care Expires: 03/09/20  · Plan of Care Reviewed with: patient    Subjective     Pain/Comfort:  Pain Rating 1: 7/10  Location - Side 1: Right  Location - Orientation 1: lower  Location 1: rib(s)  Pain Addressed 1: Reposition  Pain Rating Post-Intervention 1: 0/10    Objective:     Communicated with: RN prior to session.  Patient found supine with peripheral IV, bryant catheter upon OT entry to room.    General Precautions: Standard, fall   Orthopedic Precautions:N/A   Braces: N/A     Occupational Performance:     Bed Mobility:    · Patient completed Supine to Sit with minimum assistance    Functional  Mobility/Transfers:  · Patient completed Sit <> Stand Transfer with minimum assistance  with  no assistive device   · Patient completed Bed <> Chair Transfer using Stand Pivot technique with moderate assistance with hand-held assist  · Functional Mobility: Pt transferred to chair with stand pivot transfer and mod A. No AD.     Activities of Daily Living:  · Feeding:  moderate assistance (R hand gout, L hand carpal tunnel surgery and gout, pt also has limited ROM in L shoulder during) Pt able to drink from cup with straw using both hands and very awkwardly. Requires therapist to provide CGA to cup to minimize spills.   · Grooming: minimum assistance for cleaning dentures while sitting in chair  · Bathing: min A for wiping with bath wipes, except back and andres area  · Upper Body Dressing: maximal assistance (shoulder impairments, impaired ROM would benefit from dressing stick/training)  · Lower Body Dressing: dependence for socks due to pain and ROM limitations, pt attempted but was unable.   · Toileting: pt could use a BSC, would be mod A for stand pivot transfers. He has been bed level toileting. Pt has been having trouble holding it due to stool softeners.     [facility] 6 Click ADL: 13    Treatment & Education:  · Pt educated on role of OT in acute care setting.   · Assisted with ADLs and functional mobility with assist levels noted above  · Pt AROM very limited for B UE. He is unable to use hands to assist for scooting, sit to stand or even for stand pivot transfers. Pt educated on compensatory dressing techniques and availability of AE to assist given existing limitations.     Patient left up in chair with all lines intact and call button in reachEducation:      GOALS:   Multidisciplinary Problems     Occupational Therapy Goals        Problem: Occupational Therapy Goal    Goal Priority Disciplines Outcome Interventions   Occupational Therapy Goal     OT, PT/OT Ongoing, Progressing    Description:  Goals to be met by:  3/20/20     Patient will increase functional independence with ADLs by performing:    LE Dressing with Moderate Assistance and AE PRN.  Grooming while standing with Minimum Assistance.  Toileting from toilet with Maximum Assistance for hygiene and clothing management.   Toilet transfer to toilet with Contact Guard Assistance.                      Time Tracking:     OT Date of Treatment: 03/09/20  OT Start Time: 1405  OT Stop Time: 1428  OT Total Time (min): 23 min    Billable Minutes:Self Care/Home Management 23    DENICE Martin  3/9/2020

## 2020-03-09 NOTE — HOSPITAL COURSE
3/6/20: Evaluated by PT & OT. Bed mobility CGA- SBA. Ambulated 4 side steps Tamica w/ RW. UBD Tamica. Toileting totalA.   3/9/20: Participated w/ PT. Sit to stand maxA- HHA. Ambulated 2 side steps HHA- Tamica.

## 2020-03-09 NOTE — PLAN OF CARE
03/09/20 1233   Post-Acute Status   Post-Acute Authorization Placement   Post-Acute Placement Status Pending Payor Review  (rehab)     MARIANA informed by Miguelito with [facility] that [facility] is requesting updated therapy notes in order to authorize rehab.  MARIANA relayed this to MARIZOL Cervantes and ROSY Hernandez.    UPDATE 2:39 PM  MARIANA informed by Miguelito with [employer] that request to auth has been submitted to [facility].  MARIANA confirmed with Nisha with [facility] that auth request was received.    Claribel Monsalve LMSW  [facility]  [phone]

## 2020-03-09 NOTE — PT/OT/SLP PROGRESS
Physical Therapy Treatment    Patient Name:  Bj Pate Jr.   MRN:  [id number]    Recommendations:     Discharge Recommendations:  rehabilitation facility   Discharge Equipment Recommendations: walker, rolling   Barriers to discharge: Inaccessible home and Decreased caregiver support    Assessment:     Bj Pate Jr. is a 70 y.o. male admitted with a medical diagnosis of Leukocytosis.  He presents with the following impairments/functional limitations:  weakness, impaired endurance, impaired self care skills, impaired functional mobilty, gait instability, impaired balance, decreased upper extremity function, decreased safety awareness, decreased coordination, edema, impaired cardiopulmonary response to activity, decreased ROM . Patient showed good participation. Patient needed cues to use proper mechanics to transfer from sit to stand and showed limited ability to take steps safely due to surgery on the L hand and decreased strength on the R hand. Patient showed anterior lean in standing, which limited his ability to walk safely to far away from chair.    Rehab Prognosis: Fair; patient would benefit from acute skilled PT services to address these deficits and reach maximum level of function.    Recent Surgery: * No surgery found *      Plan:     During this hospitalization, patient to be seen 3 x/week to address the identified rehab impairments via gait training, therapeutic activities, therapeutic exercises, neuromuscular re-education and progress toward the following goals:    · Plan of Care Expires:  04/05/20    Subjective     Chief Complaint: weakness  Patient/Family Comments/goals: to get stronger and to heal well.  Pain/Comfort:  · Pain Rating 1: 5/10  · Location - Side 1: Left  · Location - Orientation 1: generalized  · Location 1: hand  · Pain Addressed 1: Reposition, Distraction  · Pain Rating Post-Intervention 1: 5/10      Objective:     Communicated with nsg prior to session.  Patient found up in chair  with   upon PT entry to room.     General Precautions: Standard, fall   Orthopedic Precautions:N/A   Braces: N/A     Functional Mobility:  · Transfers:     · Sit to Stand:  maximal assistance with hand-held assist  · Gait: 2 side steps from R<>L x 2 trials with HHA with min assistance.  · Balance: poor in standing      AM-PAC 6 CLICK MOBILITY  Turning over in bed (including adjusting bedclothes, sheets and blankets)?: 3  Sitting down on and standing up from a chair with arms (e.g., wheelchair, bedside commode, etc.): 2  Moving from lying on back to sitting on the side of the bed?: 3  Moving to and from a bed to a chair (including a wheelchair)?: 2  Need to walk in hospital room?: 2  Climbing 3-5 steps with a railing?: 1  Basic Mobility Total Score: 13       Therapeutic Activities and Exercises:   Assisted cleaning soiled gown and blue pads. Static and weight shifting balance activity in standing with HHA 3x30 secs with min to mod assistance due to anterior lean.  There ex in sitting: LAQ, HIP FLEX AND HEEL/TOE RAISES 2X10 REPS B LE.    Patient left up in chair with all lines intact and call button in reach..    GOALS:   Multidisciplinary Problems     Physical Therapy Goals        Problem: Physical Therapy Goal    Goal Priority Disciplines Outcome Goal Variances Interventions   Physical Therapy Goal     PT, PT/OT Ongoing, Progressing     Description:  Goals to be met by: 20     Patient will increase functional independence with mobility by performin. Supine to sit with Stand-by Assistance  2. Sit to supine with Stand-by Assistance  3. Sit to stand transfer with Stand-by Assistance with RW or appropriate AD  4. Bed to chair transfer with Contact Guard Assistance using Rolling Walker or appropriate AD  5. Gait  x 50 feet with Contact Guard Assistance using Rolling Walker or appropriate AD  6. Lower extremity exercise program x15 reps per handout, with assistance as needed                      Time  Tracking:     PT Received On: 03/09/20  PT Start Time: 1649     PT Stop Time: 1713  PT Total Time (min): 24 min     Billable Minutes: Therapeutic Activity 16 and Therapeutic Exercise 8    Treatment Type: Treatment  PT/PTA: PTA     PTA Visit Number: 1     Billy Boland PTA  03/09/2020

## 2020-03-09 NOTE — PLAN OF CARE
Pt AAOX4. Vitals wnl and charted. Cpap worn during the evening, pain control with medication. Pt is incontinent/2person assist. Pt needs assistance with meals and taking meds. Labs were completed in the AM. Safety measures in place. Call bell with in reach. No falls.

## 2020-03-09 NOTE — HPI
Bj Pate is a 71-year-old male with PMHx of CVA without residual deficit, COPD, HILARIO on CPAP, gout, DDD of Lumbar, cervical Radiculopathy, left hand carpal release, and recent admission on 2/26 for polyarticular gout. Discharged and readmitted 3/5/20 for leukocytosis and possible UTI. Started on Cefepime. Urine cx positive for pseudomonas changed to Cipro x 10 days. Continues with leukocytosis. ID recommended CT A/P and negative for pyelonephritis.      Functional History: Patient lives alone in a single story home with no steps to enter.  Prior to admission, (I) with ADLs and mobility. DME: none.

## 2020-03-09 NOTE — PROGRESS NOTES
[facility] Hospital Medicine                                                                     Progress Note     Team: [facility] Guilherme Anderson MD   Admit Date: 3/5/2020   Hospital Day: 3  GLORIA: 3/9/2020   Code status: Full Code   Principal Problem: Leukocytosis     SUMMARY:     Bj Pate Jr. is a 70M with CVA w/o residual deficit, COPD, HILARIO on CPAP, Gout, DDD of Lumbar, Cervical Radiculopathy, HTN, and recent admission for polyarticular gout (discharged on prednisone and colchicine) and urinary retention s/p recent bryant admitted for worsening leukocytosis of ~65. Admitted to hospital medicine for suspected UTI vs wound infection. Bryant exchanged in the ED.  Started on bs-abx. Urine with GNR >100K. Wound care seen patient, and wound does not look infected. Remains HDS and afebrile. Leucocytosis resolving. Remains on BS-abx. Bryant removed, and now voiding. Urine grew pseudomonas, changed cefepime to cipro BID for total 10 days. Discussed with ID and they rec CT A/P to ro pyelonephritis concerning his presenting wbc count.  CT A/P without overt pyelonephritis. At this time he is on oral abx, and medically stable for d/c back to rehab.     SUBJECTIVE:     Pt was seen and examined at bedside. Pt had no acute events overnight, and no new complaints this morning. Pt remained hemodynamically stable and afebrile. No new complaints other than arthralgias, myalgia. Eating well. Highly advised progressive mobility.      ROS (Positive in Bold, otherwise negative)  Pain Scale: 0 /10   Constitutional: fever, chills, night sweats, weakness  CV: chest pain, edema, palpitations  Resp: SOB, cough, sputum production  GI: changes in appetite, NVDC, pain, melena, hematochezia, GERD, hematemesis  : Dysuria, hematuria, urinary urgency, frequency  MSK: arthralgia/myalgia,  joint swelling  SKIN: rashes, pruritis, petechiae   Neuro/Psych, FNDm anxiety, depression      OBJECTIVE:     Vitals:  Temp:  [97.6 °F (36.4 °C)-98.8 °F (37.1 °C)]   Pulse:  [73-89]   Resp:  [14-20]   BP: (109-141)/(54-77)   SpO2:  [95 %-98 %]      I & O (Last 24H):   No intake or output data in the 24 hours ending 03/09/20 1251    GEN: AA male in no acute distress. Nontoxic. Resting in bed. Cooperative. Appears comfortable.  HEENT: NCAT. PERRL. EOMI. Conjunctivae/corneas clear, sclera Anicteric.  CVS: RRR. Normal s1 s2 no murmur, click, rub or gallop  LUNG: CTAB. Normal respiratory effort. Anterior exam with no wheezes, rhonchi, or crackles.  ABD: Normoactive BS, soft, NT, ND, no masses or organomegaly.  EXT: Joint swelling noted on multiple UE joints all improving. R arm swollen compared to L, improving. ROM gradually improving   SKIN: color, texture, turgor normal. No rashes or lesions  NEURO: Alert, oriented x 4, grossly normal      All recent labs and imaging has been reviewed.     Recent Results (from the past 24 hour(s))   POCT glucose    Collection Time: 03/08/20  3:49 PM   Result Value Ref Range    POCT Glucose 188 (H) 70 - 110 mg/dL   POCT glucose    Collection Time: 03/08/20  8:34 PM   Result Value Ref Range    POCT Glucose 203 (H) 70 - 110 mg/dL   Basic Metabolic Panel (BMP)    Collection Time: 03/09/20  5:13 AM   Result Value Ref Range    Sodium 129 (L) 136 - 145 mmol/L    Potassium 5.5 (H) 3.5 - 5.1 mmol/L    Chloride 99 95 - 110 mmol/L    CO2 20 (L) 23 - 29 mmol/L    Glucose 108 70 - 110 mg/dL    BUN, Bld 27 (H) 8 - 23 mg/dL    Creatinine 1.0 0.5 - 1.4 mg/dL    Calcium 9.1 8.7 - 10.5 mg/dL    Anion Gap 10 8 - 16 mmol/L    eGFR if African American >60.0 >60 mL/min/1.73 m^2    eGFR if non African American >60.0 >60 mL/min/1.73 m^2   Magnesium    Collection Time: 03/09/20  5:13 AM   Result Value Ref Range    Magnesium 1.5 (L) 1.6 - 2.6 mg/dL   Phosphorus    Collection Time: 03/09/20  5:13 AM   Result  Value Ref Range    Phosphorus 3.4 2.7 - 4.5 mg/dL   CBC with Automated Differential    Collection Time: 03/09/20  5:13 AM   Result Value Ref Range    WBC 26.40 (H) 3.90 - 12.70 K/uL    RBC 3.96 (L) 4.60 - 6.20 M/uL    Hemoglobin 11.1 (L) 14.0 - 18.0 g/dL    Hematocrit 34.7 (L) 40.0 - 54.0 %    Mean Corpuscular Volume 88 82 - 98 fL    Mean Corpuscular Hemoglobin 28.0 27.0 - 31.0 pg    Mean Corpuscular Hemoglobin Conc 32.0 32.0 - 36.0 g/dL    RDW 14.8 (H) 11.5 - 14.5 %    Platelets 447 (H) 150 - 350 K/uL    MPV 10.4 9.2 - 12.9 fL    Immature Granulocytes CANCELED 0.0 - 0.5 %    Immature Grans (Abs) CANCELED 0.00 - 0.04 K/uL    nRBC 0 0 /100 WBC    Gran% 76.0 (H) 38.0 - 73.0 %    Lymph% 10.0 (L) 18.0 - 48.0 %    Mono% 8.0 4.0 - 15.0 %    Eosinophil% 1.0 0.0 - 8.0 %    Basophil% 0.0 0.0 - 1.9 %    Bands 3.0 %    Metamyelocytes 1.0 %    Myelocytes 1.0 %    Platelet Estimate Increased (A)     Aniso Slight     Poly Occasional     Basophilic Stippling Occasional     Large/Giant Platelets Present     Differential Method Manual    POCT glucose    Collection Time: 03/09/20  8:41 AM   Result Value Ref Range    POCT Glucose 108 70 - 110 mg/dL   Basic metabolic panel    Collection Time: 03/09/20 10:10 AM   Result Value Ref Range    Sodium 130 (L) 136 - 145 mmol/L    Potassium 5.0 3.5 - 5.1 mmol/L    Chloride 97 95 - 110 mmol/L    CO2 25 23 - 29 mmol/L    Glucose 113 (H) 70 - 110 mg/dL    BUN, Bld 27 (H) 8 - 23 mg/dL    Creatinine 1.1 0.5 - 1.4 mg/dL    Calcium 8.9 8.7 - 10.5 mg/dL    Anion Gap 8 8 - 16 mmol/L    eGFR if African American >60.0 >60 mL/min/1.73 m^2    eGFR if non African American >60.0 >60 mL/min/1.73 m^2   POCT glucose    Collection Time: 03/09/20 12:48 PM   Result Value Ref Range    POCT Glucose 161 (H) 70 - 110 mg/dL       Recent Labs   Lab 03/08/20  0715 03/08/20  1235 03/08/20  1549 03/08/20  2034 03/09/20  0841 03/09/20  1248   POCTGLUCOSE 84 169* 188* 203* 108 161*       Hemoglobin A1C   Date Value Ref Range  Status   03/07/2020 6.2 (H) 4.0 - 5.6 % Final     Comment:     ADA Screening Guidelines:  5.7-6.4%  Consistent with prediabetes  >or=6.5%  Consistent with diabetes  High levels of fetal hemoglobin interfere with the HbA1C  assay. Heterozygous hemoglobin variants (HbS, HgC, etc)do  not significantly interfere with this assay.   However, presence of multiple variants may affect accuracy.     08/19/2019 5.6 4.0 - 5.6 % Final     Comment:     ADA Screening Guidelines:  5.7-6.4%  Consistent with prediabetes  >or=6.5%  Consistent with diabetes  High levels of fetal hemoglobin interfere with the HbA1C  assay. Heterozygous hemoglobin variants (HbS, HgC, etc)do  not significantly interfere with this assay.   However, presence of multiple variants may affect accuracy.     01/25/2019 5.6 4.0 - 5.6 % Final     Comment:     ADA Screening Guidelines:  5.7-6.4%  Consistent with prediabetes  >or=6.5%  Consistent with diabetes  High levels of fetal hemoglobin interfere with the HbA1C  assay. Heterozygous hemoglobin variants (HbS, HgC, etc)do  not significantly interfere with this assay.   However, presence of multiple variants may affect accuracy.          Active Hospital Problems    Diagnosis  POA    *Leukocytosis [D72.829]  Yes    Urinary tract infection with hematuria [N39.0, R31.9]  Yes    Type 2 diabetes mellitus with hyperglycemia, with long-term current use of insulin [E11.65, Z79.4]  Not Applicable    Hyperkalemia [E87.5]  Yes    Hyponatremia [E87.1]  Yes    Pressure injury of buttock, unstageable [L89.300]  Yes    Acute idiopathic gout of multiple sites [M10.09]  Yes    Acute kidney injury superimposed on chronic kidney disease [N17.9, N18.9]  Yes    Transaminitis [R74.0]  Yes    Urinary retention [R33.9]  Yes    Decreased functional mobility and endurance [Z74.09]  Yes    COPD, moderate [J44.9]  Yes    Essential hypertension [I10]  Yes    History of CVA (cerebrovascular accident) [Z86.73]  Not Applicable     Morbid obesity with BMI of 45.0-49.9, adult [E66.01, Z68.42]  Not Applicable    Obstructive sleep apnea on CPAP: setting = 8 [G47.33, Z99.89]  Not Applicable      Resolved Hospital Problems   No resolved problems to display.          ASSESSMENT AND PLAN:     Pseudomonas UTI  Significant leukocytosis  - Patient with recent admission for polyarticular gout (discharged on prednisone and colchicine) and urinary retention s/p recent byrant admitted for worsening leukocytosis of ~65.   - Admitted to hospital medicine for suspected UTI vs wound infection.   - Bryant exchanged in the ED. And urine clean catch as per ED notes.  - Started on bs-abx - vanc and cefepime  - Wound care seen patient and personally seen wound, and wound does not look infected  - Urine with GNR >100K, pending final, d/c vancomycin, now growing pseudomonas  - Abx switched to oral cipro for total 10 days  - ID rec CT AP to ro pyelonephritis, no overt pyelo on scan  - Bryant now removed and urinating well   - Remains HDS and afebrile.   - Leucocytosis resolving  - Stable for dc to rehab at this time     Polyarticular Gout  - Continue steroid taper and colchicine as per rheum per last admit  - Supportive care     Urinary Retention  - Patient retaining urine on last admit s/p bryant placement and started tamsulosin   - Bryant removed 2/29, but failed voiding trail and was replaced by overnight RN. No notes in chart or amount retained charted  - Discussed with urology and they rec keeping as he failed multiple voiding trials, and rec outpatient fu at urology clinic for voiding trials.   - D/c-ed patient to rehab with bryant, now with UTI  - Bryant was exchanged this admission in the ED  - Bryant removed, and patient voiding fine  - Continue tamsulosin  - Bladder scan PRN and straight in/outs, avoid bryant placement!    Moisture associated dermatitis in the gluteal region  - present on admission  - wound care seen patient   - wound care recs -Nursing to  cleanse sacrum with cleansing wipes and apply Triad ointment BID and prn cleaning to sacrum. Triad ointment provides a hydrophilic environment to promote healing of exposed tissue and prevents breakdown of intact skin.  -wound care recs- Nursing to continue pressure prevention interventions as directed. Please assist pt with turning and repositioning every 2 hours with the use of a wedge/pillows, float bilateral heels with pillows or heel lift boots to alleviate pressure off bed surface, and apply border foams as needed to protect bony prominences.    Hypomagnesemia  - Replete and recheck     Generalized weakness  - see above   - PT OT, rehab on discharge     Left carpal tunnel syndrome  - recent left carpal tunnel release  - incisions clean dry and intact without evidence of acute infection  - ortho seen patient on last admission s/p wrist aspiration noting gout  - treat gout as above     COPD, moderate  - Duonebs prn     Obstructive sleep apnea on CPAP  - setting = 8  - CPAP HS       Prophylaxis- Lovenox   Code Status- Full Code   Discharge plan and follow up - back to rehab, he is medically ready for discharge    Guilherme Anderson MD  Hospital Medicine Staff  Pager [phone]

## 2020-03-09 NOTE — PLAN OF CARE
03/09/20 1528   Discharge Reassessment   Assessment Type Discharge Planning Reassessment   Provided patient/caregiver education on the expected discharge date and the discharge plan Yes   Do you have any problems affording any of your prescribed medications? No   Discharge Plan A Rehab   Discharge Plan B Home Health;Home with family

## 2020-03-09 NOTE — PLAN OF CARE
03/09/20 1059   Post-Acute Status   Post-Acute Authorization Placement   Post-Acute Placement Status Referrals Sent     SW met with pt to discuss discharge planning.  Pt voiced agreement with plan for rehab and preference for [facility].  SW sent referral to Melissa.    Claribel Monsalve LMSW  [facility]  [id number]

## 2020-03-09 NOTE — PLAN OF CARE
03/09/20 1136   Post-Acute Status   Post-Acute Authorization Placement   Post-Acute Placement Status Pending Post-Acute Clinical Review  (rehab)     Under review with Rian Monsalve LMSW  [facility]  [id number]

## 2020-03-09 NOTE — PLAN OF CARE
Problem: Physical Therapy Goal  Goal: Physical Therapy Goal  Description  Goals to be met by: 20     Patient will increase functional independence with mobility by performin. Supine to sit with Stand-by Assistance  2. Sit to supine with Stand-by Assistance  3. Sit to stand transfer with Stand-by Assistance with RW or appropriate AD  4. Bed to chair transfer with Contact Guard Assistance using Rolling Walker or appropriate AD  5. Gait  x 50 feet with Contact Guard Assistance using Rolling Walker or appropriate AD  6. Lower extremity exercise program x15 reps per handout, with assistance as needed     Outcome: Ongoing, Progressing.  Patient's ability to ambulate continues to be very limited due to limited use of B hands and decreased balance.

## 2020-03-09 NOTE — CONSULTS
[facility]  Physical Medicine & Rehab  Consult Note    Patient Name: Bj Pate Jr.  MRN: [id number]  Admission Date: 3/5/2020  Hospital Length of Stay: 3 days  Attending Physician:     Inpatient consult to Physical Medicine & Rehabilitation  Consult performed by: Alisa Boone NP  Consult requested by:  Guilherme Anderson MD    Collaborating Physician: Oswaldo Stokes MD  Reason for Consult:  Assess rehabilitation needs     Consults  Subjective:     Principal Problem: Leukocytosis    HPI: Bj Pate is a 71-year-old male with PMHx of CVA without residual deficit, COPD, HILARIO on CPAP, gout, DDD of Lumbar, cervical Radiculopathy, left hand carpal release, and recent admission on 2/26 for polyarticular gout. Discharged and readmitted 3/5/20 for leukocytosis and possible UTI. Started on Cefepime. Urine cx positive for pseudomonas changed to Cipro x 10 days. Continues with leukocytosis. ID recommended CT A/P and negative for pyelonephritis.      Functional History: Patient lives alone in a single story home with no steps to enter.  Prior to admission, (I) with ADLs and mobility. DME: none.     Hospital Course: 3/6/20: Evaluated by PT & OT. Bed mobility CGA- SBA. Ambulated 4 side steps [place] w/ RW. UBD [place]. Toileting totalA.     Past Medical History:   Diagnosis Date    Anticoagulant long-term use     Basal ganglia hemorrhage     Left basal ganglia hemorrhage with resultant right-sided hemiparesis which has resolved.     Benign hypertension with CKD (chronic kidney disease) stage III      Cataract     Chronic idiopathic gout of multiple sites     Chronic kidney disease, stage 3     COPD (chronic obstructive pulmonary disease)     Erectile dysfunction     Gout     Hemorrhoids without complication     Hyperlipidemia     Morbid obesity     Obstructive sleep apnea on CPAP     Stroke 2016, 2006    Thalamic infarct, acute (right) 1/2016    Type 2 DM with CKD stage 3 and hypertension      On pravastatin for cardiovascular protection.      Past Surgical History:   Procedure Laterality Date    CARPAL TUNNEL RELEASE Left 2/19/2020    Procedure: RELEASE, CARPAL TUNNEL LEFT;  Surgeon: Maria Luisa Mccurdy MD;  Location: [facility] OR;  Service: Orthopedics;  Laterality: Left;    EPIDURAL STEROID INJECTION N/A 5/2/2019    Procedure: Injection, Steroid, Epidural Cervical;  Surgeon: Dariel Doan Jr., MD;  Location: [facility] ENDO;  Service: Pain Management;  Laterality: N/A;  Cervical Epidural Steroid Injection     [id number]    Arrive @ 1130; ASA; Check BG    EPIDURAL STEROID INJECTION Bilateral 5/29/2019    Procedure: Lumbar Medial Branch Blocks;  Surgeon: Dariel Doan Jr., MD;  Location: [facility];  Service: Pain Management;  Laterality: Bilateral;  Bilateral Lumbar Medial Branch Blocks L3, L4, L5    [id number]  [id number]    Attive @ 1030 (11 arrival request); NO Sedation; ASA; Check BG    EPIDURAL STEROID INJECTION Bilateral 7/3/2019    Procedure: Lumbar Medial Branch Blocks;  Surgeon: Dariel Doan Jr., MD;  Location: [facility] ENDO;  Service: Pain Management;  Laterality: Bilateral;  Bilateral Lumbar Medial Branch Blocks L3, L4, L5    [id number]  [id number]    Arrive @ 0930; NO Sedation; ASA; Check BG    EPIDURAL STEROID INJECTION N/A 8/7/2019    Procedure: Injection, Steroid, Epidural Cervical;  Surgeon: Dariel Doan Jr., MD;  Location: [facility];  Service: Pain Management;  Laterality: N/A;  Cervical Epidural Steroid Injection C7-T1    [id number]    Arrive @ 1115; Last ASA 7/30; Check BG    NO PAST SURGERIES       Review of patient's allergies indicates:   Allergen Reactions    Tomato (solanum lycopersicum) Hives    Naproxen Hives    Shrimp Other (See Comments)       Scheduled Medications:    ascorbic acid (vitamin C)  500 mg Oral Daily    aspirin  81 mg Oral Daily    atorvastatin  40 mg Oral Daily    ciprofloxacin HCl  750 mg Oral Q12H    diltiaZEM  240 mg Oral Daily    enoxaparin  40 mg Subcutaneous  Q12H    insulin detemir U-100  10 Units Subcutaneous BID    magnesium sulfate IVPB  3 g Intravenous Once    oxybutynin  5 mg Oral TID    predniSONE  20 mg Oral Daily    tamsulosin  0.4 mg Oral Daily    vitamin D  1,000 Units Oral Daily       PRN Medications: acetaminophen, albuterol-ipratropium, bisacodyL, Dextrose 10% Bolus, Dextrose 10% Bolus, gabapentin, glucagon (human recombinant), glucose, glucose, hydrALAZINE, insulin aspart U-100, melatonin, ondansetron, oxyCODONE, oxyCODONE, polyethylene glycol, promethazine (PHENERGAN) IVPB, sodium chloride 0.9%, sodium chloride 0.9%    Family History     Problem Relation (Age of Onset)    Diabetes Paternal Grandfather    Heart disease Paternal Grandfather    Hypertension     No Known Problems Mother, Father, Sister, Brother, Maternal Aunt, Maternal Uncle, Paternal Aunt, Paternal Uncle, Maternal Grandmother, Maternal Grandfather, Paternal Grandmother        Tobacco Use    Smoking status: Never Smoker    Smokeless tobacco: Never Used   Substance and Sexual Activity    Alcohol use: Yes     Alcohol/week: 0.0 standard drinks     Comment: occacionally    Drug use: No    Sexual activity: Not Currently     Review of Systems   Constitutional: Positive for activity change. Negative for fatigue and fever.   HENT: Negative for trouble swallowing and voice change.    Respiratory: Negative for cough and shortness of breath.    Cardiovascular: Negative for chest pain and leg swelling.   Gastrointestinal: Negative for abdominal distention and abdominal pain.   Genitourinary: Negative for difficulty urinating and flank pain.   Musculoskeletal: Positive for gait problem. Negative for back pain.   Skin: Negative for color change and rash.   Neurological: Positive for weakness. Negative for speech difficulty and numbness.   Psychiatric/Behavioral: Negative for agitation, behavioral problems and confusion.     Objective:     Vital Signs (Most Recent):  Temp: 98.2 °F (36.8 °C)  (03/09/20 0833)  Pulse: 73 (03/09/20 0833)  Resp: 14 (03/09/20 0833)  BP: (!) 141/77 (03/09/20 0833)  SpO2: 98 % (03/09/20 0833)    Vital Signs (24h Range):  Temp:  [97.6 °F (36.4 °C)-98.8 °F (37.1 °C)] 98.2 °F (36.8 °C)  Pulse:  [73-84] 73  Resp:  [14-20] 14  SpO2:  [95 %-98 %] 98 %  BP: (109-141)/(54-77) 141/77     Body mass index is 47.17 kg/m².    Physical Exam   Constitutional: He is oriented to person, place, and time. He appears well-developed and well-nourished.   HENT:   Head: Normocephalic and atraumatic.   Eyes: Pupils are equal, round, and reactive to light. EOM are normal. Right eye exhibits no discharge. Left eye exhibits no discharge.   Neck: Neck supple.   Pulmonary/Chest: Effort normal. No respiratory distress.   Musculoskeletal: He exhibits no edema or deformity.   - generalized weakness   Neurological: He is alert and oriented to person, place, and time. No sensory deficit. He exhibits normal muscle tone.   Skin: Skin is warm and dry.   Psychiatric: He has a normal mood and affect. His behavior is normal.   Nursing note and vitals reviewed.    Diagnostic Results:   Labs: Reviewed   EKG: Reviewed   CT: Reviewed    Assessment/Plan:     * Leukocytosis  - ID recommended CT A/P and negative for pyelonephritis.        Urinary tract infection with hematuria  - Urine cx positive for pseudomonas changed to Cipro x 10 days.     Decreased functional mobility and endurance  - Related to prolonged/acute hospital course.     Recommendations  -  Encourage mobility, OOB in chair at least 3 hours per day, and early ambulation as appropriate  -  PT/OT evaluate and treat  -  Pain management  -  Monitor for and prevent skin breakdown and pressure ulcers  · Early mobility, repositioning/weight shifting every 20-30 minutes when sitting, turn patient every 2 hours, proper mattress/overlay and chair cushioning, pressure relief/heel protector boots  -  DVT prophylaxis    -  Reviewed discharge options (IP rehab, SNF,   therapy, and OP therapy)    Recommend Inpatient Rehab pending medical stability.       Thank you for your consult.     Alisa Boone NP  Department of Physical Medicine & Rehab  [facility]

## 2020-03-09 NOTE — CONSULTS
Inpatient consult to Physical Medicine Rehab  Consult performed by: Alisa Boone NP  Consult ordered by: Guilherme Anderson MD  Reason for consult: Assess rehab needs      Reviewed patient history and current admission.  Rehab team following.  Full consult to follow.    SUREKHA Flores, FNP-C  Physical Medicine & Rehabilitation   03/09/2020  Spectralink: [id number]

## 2020-03-09 NOTE — SUBJECTIVE & OBJECTIVE
Past Medical History:   Diagnosis Date    Anticoagulant long-term use     Basal ganglia hemorrhage     Left basal ganglia hemorrhage with resultant right-sided hemiparesis which has resolved.     Benign hypertension with CKD (chronic kidney disease) stage III      Cataract     Chronic idiopathic gout of multiple sites     Chronic kidney disease, stage 3     COPD (chronic obstructive pulmonary disease)     Erectile dysfunction     Gout     Hemorrhoids without complication     Hyperlipidemia     Morbid obesity     Obstructive sleep apnea on CPAP     Stroke 2016, 2006    Thalamic infarct, acute (right) 1/2016    Type 2 DM with CKD stage 3 and hypertension     On pravastatin for cardiovascular protection.      Past Surgical History:   Procedure Laterality Date    CARPAL TUNNEL RELEASE Left 2/19/2020    Procedure: RELEASE, CARPAL TUNNEL LEFT;  Surgeon: Maria Luisa Mccurdy MD;  Location: [facility] [place] OR;  Service: Orthopedics;  Laterality: Left;    EPIDURAL STEROID INJECTION N/A 5/2/2019    Procedure: Injection, Steroid, Epidural Cervical;  Surgeon: Dariel Doan Jr., MD;  Location: [facility] ENDO;  Service: Pain Management;  Laterality: N/A;  Cervical Epidural Steroid Injection     [id number]    Arrive @ 1130; ASA; Check BG    EPIDURAL STEROID INJECTION Bilateral 5/29/2019    Procedure: Lumbar Medial Branch Blocks;  Surgeon: Dariel Doan Jr., MD;  Location: [facility] ENDO;  Service: Pain Management;  Laterality: Bilateral;  Bilateral Lumbar Medial Branch Blocks L3, L4, L5    [id number]  36920    Attive @ 1030 (11 arrival request); NO Sedation; ASA; Check BG    EPIDURAL STEROID INJECTION Bilateral 7/3/2019    Procedure: Lumbar Medial Branch Blocks;  Surgeon: Dariel Doan Jr., MD;  Location: [facility] ENDO;  Service: Pain Management;  Laterality: Bilateral;  Bilateral Lumbar Medial Branch Blocks L3, L4, L5    [id number]  35170    Arrive @ 0930; NO Sedation; ASA; Check BG    EPIDURAL STEROID INJECTION N/A 8/7/2019     Procedure: Injection, Steroid, Epidural Cervical;  Surgeon: Dariel Doan Jr., MD;  Location: [facility];  Service: Pain Management;  Laterality: N/A;  Cervical Epidural Steroid Injection C7-T1    76030    Arrive @ 1115; Last ASA 7/30; Check BG    NO PAST SURGERIES       Review of patient's allergies indicates:   Allergen Reactions    Tomato (solanum lycopersicum) Hives    Naproxen Hives    Shrimp Other (See Comments)       Scheduled Medications:    ascorbic acid (vitamin C)  500 mg Oral Daily    aspirin  81 mg Oral Daily    atorvastatin  40 mg Oral Daily    ciprofloxacin HCl  750 mg Oral Q12H    diltiaZEM  240 mg Oral Daily    enoxaparin  40 mg Subcutaneous Q12H    insulin detemir U-100  10 Units Subcutaneous BID    magnesium sulfate IVPB  3 g Intravenous Once    oxybutynin  5 mg Oral TID    predniSONE  20 mg Oral Daily    tamsulosin  0.4 mg Oral Daily    vitamin D  1,000 Units Oral Daily       PRN Medications: acetaminophen, albuterol-ipratropium, bisacodyL, Dextrose 10% Bolus, Dextrose 10% Bolus, gabapentin, glucagon (human recombinant), glucose, glucose, hydrALAZINE, insulin aspart U-100, melatonin, ondansetron, oxyCODONE, oxyCODONE, polyethylene glycol, promethazine (PHENERGAN) IVPB, sodium chloride 0.9%, sodium chloride 0.9%    Family History     Problem Relation (Age of Onset)    Diabetes Paternal Grandfather    Heart disease Paternal Grandfather    Hypertension     No Known Problems Mother, Father, Sister, Brother, Maternal Aunt, Maternal Uncle, Paternal Aunt, Paternal Uncle, Maternal Grandmother, Maternal Grandfather, Paternal Grandmother        Tobacco Use    Smoking status: Never Smoker    Smokeless tobacco: Never Used   Substance and Sexual Activity    Alcohol use: Yes     Alcohol/week: 0.0 standard drinks     Comment: occacionally    Drug use: No    Sexual activity: Not Currently     Review of Systems   Constitutional: Positive for activity change. Negative for fatigue and  fever.   HENT: Negative for trouble swallowing and voice change.    Respiratory: Negative for cough and shortness of breath.    Cardiovascular: Negative for chest pain and leg swelling.   Gastrointestinal: Negative for abdominal distention and abdominal pain.   Genitourinary: Negative for difficulty urinating and flank pain.   Musculoskeletal: Positive for gait problem. Negative for back pain.   Skin: Negative for color change and rash.   Neurological: Positive for weakness. Negative for speech difficulty and numbness.   Psychiatric/Behavioral: Negative for agitation, behavioral problems and confusion.     Objective:     Vital Signs (Most Recent):  Temp: 98.2 °F (36.8 °C) (03/09/20 0833)  Pulse: 73 (03/09/20 0833)  Resp: 14 (03/09/20 0833)  BP: (!) 141/77 (03/09/20 0833)  SpO2: 98 % (03/09/20 0833)    Vital Signs (24h Range):  Temp:  [97.6 °F (36.4 °C)-98.8 °F (37.1 °C)] 98.2 °F (36.8 °C)  Pulse:  [73-84] 73  Resp:  [14-20] 14  SpO2:  [95 %-98 %] 98 %  BP: (109-141)/(54-77) 141/77     Body mass index is 47.17 kg/m².    Physical Exam   Constitutional: He is oriented to person, place, and time. He appears well-developed and well-nourished.   HENT:   Head: Normocephalic and atraumatic.   Eyes: Pupils are equal, round, and reactive to light. EOM are normal. Right eye exhibits no discharge. Left eye exhibits no discharge.   Neck: Neck supple.   Pulmonary/Chest: Effort normal. No respiratory distress.   Musculoskeletal: He exhibits no edema or deformity.   - generalized weakness   Neurological: He is alert and oriented to person, place, and time. No sensory deficit. He exhibits normal muscle tone.   Skin: Skin is warm and dry.   Psychiatric: He has a normal mood and affect. His behavior is normal.   Nursing note and vitals reviewed.    NEUROLOGICAL EXAMINATION:     MENTAL STATUS   Oriented to person, place, and time.     CRANIAL NERVES     CN III, IV, VI   Pupils are equal, round, and reactive to light.  Extraocular  motions are normal.       Diagnostic Results:   Labs: Reviewed   EKG: Reviewed   CT: Reviewed

## 2020-03-10 VITALS
HEIGHT: 69 IN | HEART RATE: 89 BPM | TEMPERATURE: 98 F | BODY MASS INDEX: 46.65 KG/M2 | RESPIRATION RATE: 18 BRPM | SYSTOLIC BLOOD PRESSURE: 113 MMHG | OXYGEN SATURATION: 96 % | WEIGHT: 315 LBS | DIASTOLIC BLOOD PRESSURE: 63 MMHG

## 2020-03-10 LAB
ANION GAP SERPL CALC-SCNC: 8 MMOL/L (ref 8–16)
ANISOCYTOSIS BLD QL SMEAR: SLIGHT
BASOPHILS # BLD AUTO: ABNORMAL K/UL (ref 0–0.2)
BASOPHILS NFR BLD: 0 % (ref 0–1.9)
BUN SERPL-MCNC: 34 MG/DL (ref 8–23)
CALCIUM SERPL-MCNC: 9.2 MG/DL (ref 8.7–10.5)
CHLORIDE SERPL-SCNC: 97 MMOL/L (ref 95–110)
CO2 SERPL-SCNC: 24 MMOL/L (ref 23–29)
CREAT SERPL-MCNC: 1.3 MG/DL (ref 0.5–1.4)
DIFFERENTIAL METHOD: ABNORMAL
EOSINOPHIL # BLD AUTO: ABNORMAL K/UL (ref 0–0.5)
EOSINOPHIL NFR BLD: 0 % (ref 0–8)
ERYTHROCYTE [DISTWIDTH] IN BLOOD BY AUTOMATED COUNT: 14.8 % (ref 11.5–14.5)
EST. GFR  (AFRICAN AMERICAN): >60 ML/MIN/1.73 M^2
EST. GFR  (NON AFRICAN AMERICAN): 56.1 ML/MIN/1.73 M^2
GLUCOSE SERPL-MCNC: 101 MG/DL (ref 70–110)
HCT VFR BLD AUTO: 37.7 % (ref 40–54)
HGB BLD-MCNC: 11.9 G/DL (ref 14–18)
HYPOCHROMIA BLD QL SMEAR: ABNORMAL
IMM GRANULOCYTES # BLD AUTO: ABNORMAL K/UL (ref 0–0.04)
IMM GRANULOCYTES NFR BLD AUTO: ABNORMAL % (ref 0–0.5)
LYMPHOCYTES # BLD AUTO: ABNORMAL K/UL (ref 1–4.8)
LYMPHOCYTES NFR BLD: 4 % (ref 18–48)
MAGNESIUM SERPL-MCNC: 2 MG/DL (ref 1.6–2.6)
MCH RBC QN AUTO: 27.7 PG (ref 27–31)
MCHC RBC AUTO-ENTMCNC: 31.6 G/DL (ref 32–36)
MCV RBC AUTO: 88 FL (ref 82–98)
METAMYELOCYTES NFR BLD MANUAL: 8 %
MONOCYTES # BLD AUTO: ABNORMAL K/UL (ref 0.3–1)
MONOCYTES NFR BLD: 12 % (ref 4–15)
MYELOCYTES NFR BLD MANUAL: 2 %
NEUTROPHILS NFR BLD: 71 % (ref 38–73)
NEUTS BAND NFR BLD MANUAL: 3 %
NRBC BLD-RTO: 0 /100 WBC
PHOSPHATE SERPL-MCNC: 3.2 MG/DL (ref 2.7–4.5)
PLATELET # BLD AUTO: 410 K/UL (ref 150–350)
PLATELET BLD QL SMEAR: ABNORMAL
PMV BLD AUTO: 10.7 FL (ref 9.2–12.9)
POCT GLUCOSE: 120 MG/DL (ref 70–110)
POCT GLUCOSE: 169 MG/DL (ref 70–110)
POIKILOCYTOSIS BLD QL SMEAR: SLIGHT
POTASSIUM SERPL-SCNC: 5.2 MMOL/L (ref 3.5–5.1)
RBC # BLD AUTO: 4.3 M/UL (ref 4.6–6.2)
SODIUM SERPL-SCNC: 129 MMOL/L (ref 136–145)
TARGETS BLD QL SMEAR: ABNORMAL
WBC # BLD AUTO: 27.5 K/UL (ref 3.9–12.7)

## 2020-03-10 PROCEDURE — 85007 BL SMEAR W/DIFF WBC COUNT: CPT

## 2020-03-10 PROCEDURE — 99232 PR SUBSEQUENT HOSPITAL CARE,LEVL II: ICD-10-PCS | Mod: ,,, | Performed by: NURSE PRACTITIONER

## 2020-03-10 PROCEDURE — 99232 SBSQ HOSP IP/OBS MODERATE 35: CPT | Mod: ,,, | Performed by: NURSE PRACTITIONER

## 2020-03-10 PROCEDURE — 85027 COMPLETE CBC AUTOMATED: CPT

## 2020-03-10 PROCEDURE — 94660 CPAP INITIATION&MGMT: CPT

## 2020-03-10 PROCEDURE — 25000003 PHARM REV CODE 250: Performed by: PHYSICIAN ASSISTANT

## 2020-03-10 PROCEDURE — 97535 SELF CARE MNGMENT TRAINING: CPT

## 2020-03-10 PROCEDURE — 80048 BASIC METABOLIC PNL TOTAL CA: CPT

## 2020-03-10 PROCEDURE — 63600175 PHARM REV CODE 636 W HCPCS: Performed by: PHYSICIAN ASSISTANT

## 2020-03-10 PROCEDURE — 94761 N-INVAS EAR/PLS OXIMETRY MLT: CPT

## 2020-03-10 PROCEDURE — 63600175 PHARM REV CODE 636 W HCPCS: Performed by: INTERNAL MEDICINE

## 2020-03-10 PROCEDURE — 99221 PR INITIAL HOSPITAL CARE,LEVL I: ICD-10-PCS | Mod: ,,, | Performed by: PHYSICAL MEDICINE & REHABILITATION

## 2020-03-10 PROCEDURE — 36415 COLL VENOUS BLD VENIPUNCTURE: CPT

## 2020-03-10 PROCEDURE — 25000003 PHARM REV CODE 250: Performed by: INTERNAL MEDICINE

## 2020-03-10 PROCEDURE — 84100 ASSAY OF PHOSPHORUS: CPT

## 2020-03-10 PROCEDURE — 99239 HOSP IP/OBS DSCHRG MGMT >30: CPT | Mod: ,,, | Performed by: INTERNAL MEDICINE

## 2020-03-10 PROCEDURE — 83735 ASSAY OF MAGNESIUM: CPT

## 2020-03-10 PROCEDURE — 99221 1ST HOSP IP/OBS SF/LOW 40: CPT | Mod: ,,, | Performed by: PHYSICAL MEDICINE & REHABILITATION

## 2020-03-10 PROCEDURE — 99239 PR HOSPITAL DISCHARGE DAY,>30 MIN: ICD-10-PCS | Mod: ,,, | Performed by: INTERNAL MEDICINE

## 2020-03-10 PROCEDURE — 99900035 HC TECH TIME PER 15 MIN (STAT)

## 2020-03-10 RX ORDER — POLYETHYLENE GLYCOL 3350 17 G/17G
17 POWDER, FOR SOLUTION ORAL DAILY PRN
Refills: 0
Start: 2020-03-10 | End: 2020-07-30

## 2020-03-10 RX ORDER — CIPROFLOXACIN 750 MG/1
750 TABLET, FILM COATED ORAL EVERY 12 HOURS
Qty: 12 TABLET | Refills: 0
Start: 2020-03-10 | End: 2020-03-16

## 2020-03-10 RX ORDER — PREDNISONE 10 MG/1
TABLET ORAL
Refills: 0
Start: 2020-03-10 | End: 2020-03-16

## 2020-03-10 RX ORDER — LISINOPRIL 10 MG/1
40 TABLET ORAL DAILY
Qty: 360 TABLET | Refills: 3
Start: 2020-03-10 | End: 2021-06-11

## 2020-03-10 RX ADMIN — INSULIN DETEMIR 10 UNITS: 100 INJECTION, SOLUTION SUBCUTANEOUS at 09:03

## 2020-03-10 RX ADMIN — ASPIRIN 81 MG: 81 TABLET, COATED ORAL at 09:03

## 2020-03-10 RX ADMIN — OXYBUTYNIN CHLORIDE 5 MG: 5 TABLET ORAL at 09:03

## 2020-03-10 RX ADMIN — Medication 500 MG: at 09:03

## 2020-03-10 RX ADMIN — DILTIAZEM HYDROCHLORIDE 240 MG: 120 CAPSULE, COATED, EXTENDED RELEASE ORAL at 09:03

## 2020-03-10 RX ADMIN — PREDNISONE 10 MG: 10 TABLET ORAL at 09:03

## 2020-03-10 RX ADMIN — VITAMIN D, TAB 1000IU (100/BT) 1000 UNITS: 25 TAB at 09:03

## 2020-03-10 RX ADMIN — TAMSULOSIN HYDROCHLORIDE 0.4 MG: 0.4 CAPSULE ORAL at 09:03

## 2020-03-10 RX ADMIN — CIPROFLOXACIN HYDROCHLORIDE 750 MG: 250 TABLET, FILM COATED ORAL at 09:03

## 2020-03-10 RX ADMIN — ATORVASTATIN CALCIUM 40 MG: 20 TABLET, FILM COATED ORAL at 09:03

## 2020-03-10 RX ADMIN — ENOXAPARIN SODIUM 40 MG: 100 INJECTION SUBCUTANEOUS at 09:03

## 2020-03-10 NOTE — PLAN OF CARE
[facility]    FACILITY TRANSFER ORDERS      Patient Name: Bj Pate Jr.  YOB: 1950    PCP: Azikiwe K Lombard, MD   PCP Address: [address] / [place] [facility]  PCP Phone Number: [phone]  PCP Fax: [phone]    Encounter Date: 03/10/2020    Admit to Rehab    Vital Signs:  Routine    Diagnoses:   Active Hospital Problems    Diagnosis  POA    *Leukocytosis [D72.829]  Yes    Urinary tract infection with hematuria [N39.0, R31.9]  Yes    Type 2 diabetes mellitus with hyperglycemia, with long-term current use of insulin [E11.65, Z79.4]  Not Applicable    Hyperkalemia [E87.5]  Yes    Hyponatremia [E87.1]  Yes    Pressure injury of buttock, unstageable [L89.300]  Yes    Acute idiopathic gout of multiple sites [M10.09]  Yes    Acute kidney injury superimposed on chronic kidney disease [N17.9, N18.9]  Yes    Transaminitis [R74.0]  Yes    Urinary retention [R33.9]  Yes    Decreased functional mobility and endurance [Z74.09]  Yes    COPD, moderate [J44.9]  Yes    Essential hypertension [I10]  Yes    History of CVA (cerebrovascular accident) [Z86.73]  Not Applicable    Morbid obesity with BMI of 45.0-49.9, adult [E66.01, Z68.42]  Not Applicable    Obstructive sleep apnea on CPAP: setting = 8 [G47.33, Z99.89]  Not Applicable      Resolved Hospital Problems   No resolved problems to display.       Allergies:  Review of patient's allergies indicates:   Allergen Reactions    Tomato (solanum lycopersicum) Hives    Naproxen Hives    Shrimp Other (See Comments)       Diet: Diabetic Diet 2000 calories    Activities: Activity as tolerated    Nursing:   Assist feeding patient, encourage diet and ambulation    Labs: CBC, CMP, Mg and Phos in 4 days    CONSULTS:    Physical Therapy to evaluate and treat.  and Occupational Therapy to evaluate and treat.    MISCELLANEOUS CARE:  Routine Skin for Bedridden Patients: Apply moisture barrier cream to all skin folds and wet areas in  perineal area daily and after baths and all bowel movements. and Diabetes Care:   SN to perform and educate Diabetic management with blood glucose monitoring:, Fingerstick blood sugar AC and HS and Report CBG < 60 or > 350 to physician.    WOUND CARE ORDERS  Moisture associated dermatitis in the gluteal region  - wound care recs -Nursing to cleanse sacrum with cleansing wipes and apply Triad ointment BID and prn cleaning to sacrum. Triad ointment provides a hydrophilic environment to promote healing of exposed tissue and prevents breakdown of intact skin.  - wound care recs- Nursing to continue pressure prevention interventions as directed. Please assist pt with turning and repositioning every 2 hours with the use of a wedge/pillows, float bilateral heels with pillows or heel lift boots to alleviate pressure off bed surface, and apply border foams as needed to protect bony prominences.      Medications: Review discharge medications with patient and family and provide education.      Current Discharge Medication List      START taking these medications    Details   ciprofloxacin HCl (CIPRO) 750 MG tablet Take 1 tablet (750 mg total) by mouth every 12 (twelve) hours. for 6 days  Qty: 12 tablet, Refills: 0      polyethylene glycol (GLYCOLAX) 17 gram PwPk Take 17 g by mouth daily as needed.  Refills: 0         CONTINUE these medications which have CHANGED    Details   lisinopriL 10 MG tablet Take 4 tablets (40 mg total) by mouth once daily.  Qty: 360 tablet, Refills: 3      predniSONE (DELTASONE) 10 MG tablet 10 mg for 7 days then stop  Refills: 0    Comments: 1         CONTINUE these medications which have NOT CHANGED    Details   atorvastatin (LIPITOR) 40 MG tablet Take 1 tablet (40 mg total) by mouth once daily. Hold for 5 days then resume  Qty: 90 tablet, Refills: 1    Associated Diagnoses: Dyslipidemia      diltiaZEM (CARTIA XT) 240 MG 24 hr capsule Take 1 capsule (240 mg total) by mouth once daily.  Qty: 90  capsule, Refills: 1    Associated Diagnoses: Essential hypertension      gabapentin (NEURONTIN) 600 MG tablet Take 1 tablet (600 mg total) by mouth 3 (three) times daily.  Qty: 90 tablet, Refills: 11    Comments: Please consider 90 day supplies to promote better adherence  Associated Diagnoses: Diabetic polyneuropathy associated with type 2 diabetes mellitus      insulin detemir U-100 (LEVEMIR FLEXTOUCH) 100 unit/mL (3 mL) SubQ InPn pen Inject 10 Units into the skin 2 (two) times daily.  Refills: 0      oxyCODONE (ROXICODONE) 10 mg Tab immediate release tablet Take 1 tablet (10 mg total) by mouth every 6 (six) hours as needed.  Refills: 0    Comments: Quantity prescribed more than 7 day supply?      tamsulosin (FLOMAX) 0.4 mg Cap Take 1 capsule (0.4 mg total) by mouth once daily.  Qty: 30 capsule, Refills: 11      ADVAIR DISKUS 250-50 mcg/dose diskus inhaler Controller  Qty: 60 each, Refills: 3    Associated Diagnoses: COPD, moderate      albuterol (PROAIR HFA) 90 mcg/actuation inhaler Inhale 2 puffs into the lungs every 6 (six) hours as needed for Wheezing. Rescue  Qty: 1 each, Refills: 11      ascorbic acid, vitamin C, (VITAMIN C) 500 MG tablet Take 500 mg by mouth once daily.      aspirin (ECOTRIN) 81 MG EC tablet Take 81 mg by mouth once daily.      blood pressure test kit-large Kit 1 Device by Misc.(Non-Drug; Combo Route) route 2 (two) times daily.  Qty: 1 each, Refills: 0      blood sugar diagnostic Strp To check BG 2 times daily, to use with insurance preferred meter  Qty: 200 strip, Refills: 3    Associated Diagnoses: Type 2 diabetes mellitus with stage 3 chronic kidney disease, without long-term current use of insulin      blood-glucose meter kit To check BG 2 times daily, to use with insurance preferred meter  Qty: 1 each, Refills: 0    Associated Diagnoses: Type 2 diabetes mellitus with stage 3 chronic kidney disease, without long-term current use of insulin      COLCRYS 0.6 mg tablet Take 1 tablet (0.6  mg total) by mouth once daily. Daily until you see rheumatology  Qty: 30 tablet, Refills: 11    Associated Diagnoses: Idiopathic chronic gout of multiple sites with tophus      diclofenac sodium (VOLTAREN) 1 % Gel APPLY  2 GRAMS  TOPICALLY TO AFFECTED AREA ONCE DAILY  Qty: 300 g, Refills: 0    Comments: Please consider 90 day supplies to promote better adherence  Associated Diagnoses: Primary osteoarthritis involving multiple joints      lancets Misc To check BG 2 times daily, to use with insurance preferred meter  Qty: 200 each, Refills: 3    Associated Diagnoses: Type 2 diabetes mellitus with stage 3 chronic kidney disease, without long-term current use of insulin      vitamin D (VITAMIN D3) 1000 units Tab Take 1,000 Units by mouth once daily.                      _________________________________  Guilherme Anderson MD  03/10/2020

## 2020-03-10 NOTE — PLAN OF CARE
03/10/20 1158   Post-Acute Status   Post-Acute Authorization Placement   Post-Acute Placement Status Awaiting Orders for Rehab     SW informed by Miguelito with [facility] that auth was received.  SW relayed this to Dr Anderson and requested Facility Transfer Orders.    Claribel Monsalve, FREDDIE  [facility]  [phone]

## 2020-03-10 NOTE — PT/OT/SLP PROGRESS
Occupational Therapy   Treatment    Name: Bj Pate Jr.  MRN: [id number]  Admitting Diagnosis:  Leukocytosis       Recommendations:     Discharge Recommendations: rehabilitation facility  Discharge Equipment Recommendations:  walker, rolling  Barriers to discharge:  None, Inaccessible home environment, Decreased caregiver support    Assessment:     Bj Pate Jr. is a 70 y.o. male with a medical diagnosis of Leukocytosis.  He presents with decline in ADLs and functional mobility.  Pt is very motivated to improve functional status.  He requires max A for bathing, mod A for grooming while seated and max A for stand pivot transfers.  He has significantly impaired UE function which is a large limitation to ADLs and ability to use AD. Performance deficits affecting function are weakness, gait instability, decreased upper extremity function, decreased ROM, impaired cardiopulmonary response to activity, impaired endurance, impaired balance, decreased lower extremity function, decreased safety awareness, impaired self care skills, impaired functional mobilty, impaired skin, orthopedic precautions.     Rehab Prognosis:  Good; patient would benefit from acute skilled OT services to address these deficits and reach maximum level of function.       Plan:     Patient to be seen 4 x/week to address the above listed problems via self-care/home management, therapeutic activities, therapeutic exercises, neuromuscular re-education, sensory integration  · Plan of Care Expires: 03/09/20  · Plan of Care Reviewed with: patient    Subjective     Pain/Comfort:  · Pain Rating Post-Intervention 1: 6/10(pressure sore on bottom)    Objective:     Communicated with: RN prior to session.  Patient found sitting EOB with peripheral IV upon OT entry to room.    General Precautions: Standard, fall   Orthopedic Precautions:N/A   Braces: N/A     Occupational Performance:     Bed Mobility:    · Not tested, already EOB     Functional  Mobility/Transfers:  · Patient completed Sit <> Stand Transfer with maximal assistance  with  no AD. Therapist assisting with use of gait belt.   · Patient completed Bed <> Chair Transfer using Stand Pivot technique with maximal assistance with no assistive device. Therapist assisting with use of gait belt.  · Functional Mobility: transfers are only possible with gait belt.    Activities of Daily Living:  · Feeding:  moderate assistance    · Grooming: moderate assistance for oral care seated in bedside chair. Pt needs assist for task such as drinking water from cup, holding basin to spit in, opening toothpaste.  · Bathing: maximal assistance for washing UB and LB. Pt has significantly impaired UE ROM and funciton. He would benefit from use of long handled sponge in next level of care. This is the only way I could see an improvement in function given ROM limtations  · Upper Body Dressing: maximal assistance for doffing old gown and donning new gown  · Lower Body Dressing: total assistance for socks, pt is able to reach ankle but not able to reach further on either side  · Toileting: total assistance  OT stood patient and PCT was able to clean his bottom and apply wound cream. Otherwise pt would have had to lay down and roll to be assisted by one person only.       [facility] 6 Click ADL: 12    Treatment & Education:  · Pt educated on role of OT in acute care setting.   · Assisted with ADLs and functional mobility with assist levels noted above  · Completed full ADL today. Multiple sit to stands for bathing and transfers.   · Ordered waffle cushion for chair to minimize pressure on his pressure sore. 6/10 pain with sitting.    Patient left up in chair with call button in reachEducation:      GOALS:   Multidisciplinary Problems     Occupational Therapy Goals        Problem: Occupational Therapy Goal    Goal Priority Disciplines Outcome Interventions   Occupational Therapy Goal     OT, PT/OT Ongoing, Progressing     Description:  Goals to be met by: 3/20/20     Patient will increase functional independence with ADLs by performing:    LE Dressing with Moderate Assistance and AE PRN.  Grooming while standing with Minimum Assistance. Downgrade  Grooming while seated in BSC with set up assist only.   Toileting from toilet with Maximum Assistance for hygiene and clothing management.   Toilet transfer to toilet with Contact Guard Assistance.  UB Bathing with min A                        Time Tracking:     OT Date of Treatment: 03/10/20  OT Start Time: 1117  OT Stop Time: 1201  OT Total Time (min): 44 min    Billable Minutes:Self Care/Home Management 44    DENICE Martin  3/10/2020

## 2020-03-10 NOTE — PHYSICIAN QUERY
PT Name: Bj Pate Jr.  MR #: [id number]    Physician Query Form - Cause and Effect Relationship Clarification      CDS/: Chrissy Cadet RN                 Contact information:[email or web address]    This form is a permanent document in the medical record.     Query Date: March 10, 2020    By submitting this query, we are merely seeking further clarification of documentation. Please utilize your independent clinical judgment when addressing the question(s) below.    The Medical record contains the following:  Supporting Clinical Findings   Location in record   Pseudomonas UTI  Significant leukocytosis  - Patient with recent admission for polyarticular gout (discharged on prednisone and colchicine) and urinary retention s/p recent bryant admitted for worsening leukocytosis of ~65.   - Admitted to hospital medicine for suspected UTI vs wound infection.   - Bryant exchanged in the ED. And urine clean catch as per ED notes.  - Started on bs-abx - vanc and cefepime  - Wound care seen patient and personally seen wound, and wound does not look infected  - Urine with GNR >100K, pending final, d/c vancomycin, now growing pseudomonas  - Abx switched to oral cipro for total 10 days  - ID rec CT AP to ro pyelonephritis, no overt pyelo on scan  - Bryant now removed and urinating well   - Remains HDS and afebrile.   - Leucocytosis resolving  - Stable for dc to rehab at this time                      Urinary Retention  - Patient retaining urine on last admit s/p bryant placement and started tamsulosin   - Bryant removed 2/29, but failed voiding trail and was replaced by overnight RN. No notes in chart or amount retained charted  - Discussed with urology and they rec keeping as he failed multiple voiding trials, and rec outpatient fu at urology clinic for voiding trials.   - D/c-ed patient to rehab with bryant, now with UTI  - Bryant was exchanged this admission in the ED  - Bryant removed, and patient voiding fine  - Continue  tamsulosin  - Bladder scan PRN and straight in/outs, avoid bryant placement!                                                                                          Hosp Med PN 3/9         Provider, please clarify if there is any correlation between __pseudomonas UTI__ and __foley__.           Are the conditions:      [ x ] Due to or associated with each other   [  ] Unrelated to each other   [  ] Other (Please Specify): _________________________   [  ] Clinically Undetermined

## 2020-03-10 NOTE — PROGRESS NOTES
[facility]  Physical Medicine & Rehab  Progress Note    Patient Name: Bj Pate Jr.  MRN: [id number]  Admission Date: 3/5/2020  Length of Stay: 4 days  Attending Physician: Guilhreme Anderson MD    Subjective:     Principal Problem:Leukocytosis    Hospital Course:   3/6/20: Evaluated by PT & OT. Bed mobility CGA- SBA. Ambulated 4 side steps Tamica w/ RW. UBD Tamica. Toileting totalA.   3/9/20: Participated w/ PT. Sit to stand maxA- HHA. Ambulated 2 side steps HHA- Tamica.       Interval History 3/10/2020:  Patient is seen for follow-up rehab evaluation and recommendations: Participating with therapy. Urinary retention continued.     HPI, Past Medical, Family, and Social History remains the same as documented in the initial encounter.    Scheduled Medications:    ascorbic acid (vitamin C)  500 mg Oral Daily    aspirin  81 mg Oral Daily    atorvastatin  40 mg Oral Daily    ciprofloxacin HCl  750 mg Oral Q12H    diltiaZEM  240 mg Oral Daily    enoxaparin  40 mg Subcutaneous Q12H    insulin detemir U-100  10 Units Subcutaneous BID    oxybutynin  5 mg Oral TID    predniSONE  10 mg Oral Daily    tamsulosin  0.4 mg Oral Daily    vitamin D  1,000 Units Oral Daily       Diagnostic Results:   Labs: Reviewed    PRN Medications: acetaminophen, albuterol-ipratropium, bisacodyL, Dextrose 10% Bolus, Dextrose 10% Bolus, gabapentin, glucagon (human recombinant), glucose, glucose, hydrALAZINE, insulin aspart U-100, melatonin, ondansetron, oxyCODONE, oxyCODONE, polyethylene glycol, promethazine (PHENERGAN) IVPB, sodium chloride 0.9%, sodium chloride 0.9%    Review of Systems   Constitutional: Positive for activity change. Negative for fatigue and fever.   HENT: Negative for trouble swallowing and voice change.    Respiratory: Negative for cough and shortness of breath.    Cardiovascular: Negative for chest pain and leg swelling.   Gastrointestinal: Negative for abdominal distention and abdominal pain.    Genitourinary: Negative for difficulty urinating and flank pain.   Musculoskeletal: Positive for gait problem. Negative for back pain.   Skin: Negative for color change and rash.   Neurological: Positive for weakness. Negative for speech difficulty and numbness.   Psychiatric/Behavioral: Negative for agitation, behavioral problems and confusion.     Objective:     Vital Signs (Most Recent):  Temp: 98.1 °F (36.7 °C) (03/10/20 0935)  Pulse: 105 (03/10/20 0935)  Resp: 16 (03/10/20 0935)  BP: 119/74 (03/10/20 0935)  SpO2: (!) 93 % (03/10/20 0935)    Vital Signs (24h Range):  Temp:  [96.8 °F (36 °C)-98.4 °F (36.9 °C)] 98.1 °F (36.7 °C)  Pulse:  [] 105  Resp:  [14-18] 16  SpO2:  [92 %-97 %] 93 %  BP: (103-129)/(54-77) 119/74     Physical Exam   Constitutional: He is oriented to person, place, and time. He appears well-developed and well-nourished.   HENT:   Head: Normocephalic and atraumatic.   Eyes: Pupils are equal, round, and reactive to light. EOM are normal. Right eye exhibits no discharge. Left eye exhibits no discharge.   Neck: Neck supple.   Pulmonary/Chest: Effort normal. No respiratory distress.   Musculoskeletal: He exhibits no edema or deformity.   - generalized weakness   Neurological: He is alert and oriented to person, place, and time. No sensory deficit. He exhibits normal muscle tone.   Skin: Skin is warm and dry.   Psychiatric: He has a normal mood and affect. His behavior is normal. Thought content normal.   Nursing note and vitals reviewed.    Assessment/Plan:      * Leukocytosis  - ID recommended CT A/P and negative for pyelonephritis.        Urinary tract infection with hematuria  - Urine cx positive for pseudomonas changed to Cipro x 10 days.     Urinary retention  - continue tamsulosin  - avoid catheter.   - Bladder scan PRN and straight in/outs      Decreased functional mobility and endurance  - Related to prolonged/acute hospital course.     Recommendations  -  Encourage mobility, OOB in  chair at least 3 hours per day, and early ambulation as appropriate  -  PT/OT evaluate and treat  -  Pain management  -  Monitor for and prevent skin breakdown and pressure ulcers  · Early mobility, repositioning/weight shifting every 20-30 minutes when sitting, turn patient every 2 hours, proper mattress/overlay and chair cushioning, pressure relief/heel protector boots  -  DVT prophylaxis    -  Reviewed discharge options (IP rehab, SNF, HH therapy, and OP therapy)    Recommend Inpatient Rehab.      Alisa Boone NP  Department of Physical Medicine & Rehab   [facility]

## 2020-03-10 NOTE — PLAN OF CARE
03/10/20 1438   Post-Acute Status   Post-Acute Authorization Placement   Post-Acute Placement Status Set-up Complete  ([facility])     Transportation scheduled via wheelchair van for 4:00pm to transfer to [facility].  BAILEE Burgess to call report to [phone].  BAILEE Burgess was notified of the above information.    Claribel Monsalve LMSW  [facility]  [phone]

## 2020-03-10 NOTE — PLAN OF CARE
Goals remain appropriate, continue with OT POC.  Pt very motivated and cooperative with therapy.    Problem: Occupational Therapy Goal  Goal: Occupational Therapy Goal  Description  Goals to be met by: 3/20/20     Patient will increase functional independence with ADLs by performing:    LE Dressing with Moderate Assistance and AE PRN.  Grooming while standing with Minimum Assistance. Downgrade  Grooming while seated in BSC with set up assist only.   Toileting from toilet with Maximum Assistance for hygiene and clothing management.   Toilet transfer to toilet with Contact Guard Assistance.  UB Bathing with min A       Outcome: Ongoing, Progressing     DENICE Berry  3/10/2020  Rehab Services

## 2020-03-10 NOTE — SUBJECTIVE & OBJECTIVE
Interval History 3/10/2020:  Patient is seen for follow-up rehab evaluation and recommendations: Participating with therapy. Urinary retention continued.     HPI, Past Medical, Family, and Social History remains the same as documented in the initial encounter.    Scheduled Medications:    ascorbic acid (vitamin C)  500 mg Oral Daily    aspirin  81 mg Oral Daily    atorvastatin  40 mg Oral Daily    ciprofloxacin HCl  750 mg Oral Q12H    diltiaZEM  240 mg Oral Daily    enoxaparin  40 mg Subcutaneous Q12H    insulin detemir U-100  10 Units Subcutaneous BID    oxybutynin  5 mg Oral TID    predniSONE  10 mg Oral Daily    tamsulosin  0.4 mg Oral Daily    vitamin D  1,000 Units Oral Daily       Diagnostic Results: Labs: Reviewed    PRN Medications: acetaminophen, albuterol-ipratropium, bisacodyL, Dextrose 10% Bolus, Dextrose 10% Bolus, gabapentin, glucagon (human recombinant), glucose, glucose, hydrALAZINE, insulin aspart U-100, melatonin, ondansetron, oxyCODONE, oxyCODONE, polyethylene glycol, promethazine (PHENERGAN) IVPB, sodium chloride 0.9%, sodium chloride 0.9%    Review of Systems   Constitutional: Positive for activity change. Negative for fatigue and fever.   HENT: Negative for trouble swallowing and voice change.    Respiratory: Negative for cough and shortness of breath.    Cardiovascular: Negative for chest pain and leg swelling.   Gastrointestinal: Negative for abdominal distention and abdominal pain.   Genitourinary: Negative for difficulty urinating and flank pain.   Musculoskeletal: Positive for gait problem. Negative for back pain.   Skin: Negative for color change and rash.   Neurological: Positive for weakness. Negative for speech difficulty and numbness.   Psychiatric/Behavioral: Negative for agitation, behavioral problems and confusion.     Objective:     Vital Signs (Most Recent):  Temp: 98.1 °F (36.7 °C) (03/10/20 0935)  Pulse: 105 (03/10/20 0935)  Resp: 16 (03/10/20 0935)  BP: 119/74  (03/10/20 0935)  SpO2: (!) 93 % (03/10/20 0935)    Vital Signs (24h Range):  Temp:  [96.8 °F (36 °C)-98.4 °F (36.9 °C)] 98.1 °F (36.7 °C)  Pulse:  [] 105  Resp:  [14-18] 16  SpO2:  [92 %-97 %] 93 %  BP: (103-129)/(54-77) 119/74     Physical Exam   Constitutional: He is oriented to person, place, and time. He appears well-developed and well-nourished.   HENT:   Head: Normocephalic and atraumatic.   Eyes: Pupils are equal, round, and reactive to light. EOM are normal. Right eye exhibits no discharge. Left eye exhibits no discharge.   Neck: Neck supple.   Pulmonary/Chest: Effort normal. No respiratory distress.   Musculoskeletal: He exhibits no edema or deformity.   - generalized weakness   Neurological: He is alert and oriented to person, place, and time. No sensory deficit. He exhibits normal muscle tone.   Skin: Skin is warm and dry.   Psychiatric: He has a normal mood and affect. His behavior is normal. Thought content normal.   Nursing note and vitals reviewed.    NEUROLOGICAL EXAMINATION:     MENTAL STATUS   Oriented to person, place, and time.     CRANIAL NERVES     CN III, IV, VI   Pupils are equal, round, and reactive to light.  Extraocular motions are normal.

## 2020-03-10 NOTE — PROGRESS NOTES
[facility] Hospital Medicine                                                                     Progress Note     Team: [facility] HOSP MED G Guilherme Anderson MD   Admit Date: 3/5/2020   Hospital Day: 4  GLORIA: 3/13/2020   Code status: Full Code   Principal Problem: Leukocytosis     SUMMARY:     Bj Pate Jr. is a 70M with CVA w/o residual deficit, COPD, HILARIO on CPAP, Gout, DDD of Lumbar, Cervical Radiculopathy, HTN, and recent admission for polyarticular gout (discharged on prednisone and colchicine) and urinary retention s/p recent bryant admitted for worsening leukocytosis of ~65. Admitted to hospital medicine for suspected UTI vs wound infection. Bryant exchanged in the ED.  Started on bs-abx. Urine with GNR >100K. Wound care seen patient, and wound does not look infected. Remains HDS and afebrile. Leucocytosis resolving. Remains on BS-abx. Bryant removed, and now voiding. Urine grew pseudomonas, changed cefepime to cipro BID for total 10 days. Discussed with ID and they rec CT A/P to ro pyelonephritis concerning his presenting wbc count.  CT A/P without overt pyelonephritis. At this time he is on oral abx, and medically stable for d/c back to rehab.     SUBJECTIVE:     Pt was seen and examined at bedside. Pt had no acute events overnight, and no new complaints this morning. Pt remained hemodynamically stable and afebrile. Up and sitting up this AM. NO issues with urinary retention. Arthralgia improving. Diet good. Highly advised progressive mobility as able.      ROS (Positive in Bold, otherwise negative)  Pain Scale: 0 /10   Constitutional: fever, chills, night sweats, weakness  CV: chest pain, edema, palpitations  Resp: SOB, cough, sputum production  GI: changes in appetite, NVDC, pain, melena, hematochezia, GERD, hematemesis  : Dysuria, hematuria, urinary urgency,  frequency  MSK: arthralgia/myalgia, joint swelling  SKIN: rashes, pruritis, petechiae   Neuro/Psych, FNDm anxiety, depression      OBJECTIVE:     Vitals:  Temp:  [96.8 °F (36 °C)-98.4 °F (36.9 °C)]   Pulse:  []   Resp:  [14-18]   BP: (103-129)/(54-77)   SpO2:  [92 %-97 %]      I & O (Last 24H):     Intake/Output Summary (Last 24 hours) at 3/10/2020 1110  Last data filed at 3/10/2020 0600  Gross per 24 hour   Intake 360 ml   Output --   Net 360 ml       GEN: AA male in no acute distress. Nontoxic. Resting in bed. Cooperative. Appears comfortable.  HEENT: NCAT. PERRL. EOMI. Conjunctivae/corneas clear, sclera Anicteric.  CVS: RRR. Normal s1 s2 no murmur, click, rub or gallop  LUNG: CTAB. Normal respiratory effort. Anterior exam with no wheezes, rhonchi, or crackles.  ABD: Normoactive BS, soft, NT, ND, no masses or organomegaly.  EXT: Joint swelling noted on multiple UE joints all improving. ROM gradually improving   SKIN: color, texture, turgor normal. No rashes or lesions  NEURO: Alert, oriented x 4, grossly normal      All recent labs and imaging has been reviewed.     Recent Results (from the past 24 hour(s))   POCT glucose    Collection Time: 03/09/20 12:48 PM   Result Value Ref Range    POCT Glucose 161 (H) 70 - 110 mg/dL   POCT glucose    Collection Time: 03/09/20  5:05 PM   Result Value Ref Range    POCT Glucose 207 (H) 70 - 110 mg/dL   Basic Metabolic Panel (BMP)    Collection Time: 03/10/20  4:00 AM   Result Value Ref Range    Sodium 129 (L) 136 - 145 mmol/L    Potassium 5.2 (H) 3.5 - 5.1 mmol/L    Chloride 97 95 - 110 mmol/L    CO2 24 23 - 29 mmol/L    Glucose 101 70 - 110 mg/dL    BUN, Bld 34 (H) 8 - 23 mg/dL    Creatinine 1.3 0.5 - 1.4 mg/dL    Calcium 9.2 8.7 - 10.5 mg/dL    Anion Gap 8 8 - 16 mmol/L    eGFR if African American >60.0 >60 mL/min/1.73 m^2    eGFR if non  56.1 (A) >60 mL/min/1.73 m^2   Magnesium    Collection Time: 03/10/20  4:00 AM   Result Value Ref Range    Magnesium  2.0 1.6 - 2.6 mg/dL   Phosphorus    Collection Time: 03/10/20  4:00 AM   Result Value Ref Range    Phosphorus 3.2 2.7 - 4.5 mg/dL   CBC with Automated Differential    Collection Time: 03/10/20  4:00 AM   Result Value Ref Range    WBC 27.50 (H) 3.90 - 12.70 K/uL    RBC 4.30 (L) 4.60 - 6.20 M/uL    Hemoglobin 11.9 (L) 14.0 - 18.0 g/dL    Hematocrit 37.7 (L) 40.0 - 54.0 %    Mean Corpuscular Volume 88 82 - 98 fL    Mean Corpuscular Hemoglobin 27.7 27.0 - 31.0 pg    Mean Corpuscular Hemoglobin Conc 31.6 (L) 32.0 - 36.0 g/dL    RDW 14.8 (H) 11.5 - 14.5 %    Platelets 410 (H) 150 - 350 K/uL    MPV 10.7 9.2 - 12.9 fL    Immature Granulocytes CANCELED 0.0 - 0.5 %    Immature Grans (Abs) CANCELED 0.00 - 0.04 K/uL    Lymph # CANCELED 1.0 - 4.8 K/uL    Mono # CANCELED 0.3 - 1.0 K/uL    Eos # CANCELED 0.0 - 0.5 K/uL    Baso # CANCELED 0.00 - 0.20 K/uL    nRBC 0 0 /100 WBC    Gran% 71.0 38.0 - 73.0 %    Lymph% 4.0 (L) 18.0 - 48.0 %    Mono% 12.0 4.0 - 15.0 %    Eosinophil% 0.0 0.0 - 8.0 %    Basophil% 0.0 0.0 - 1.9 %    Bands 3.0 %    Metamyelocytes 8.0 %    Myelocytes 2.0 %    Platelet Estimate Appears normal     Aniso Slight     Poik Slight     Hypo Occasional     Target Cells Occasional     Differential Method Manual    POCT glucose    Collection Time: 03/10/20  9:31 AM   Result Value Ref Range    POCT Glucose 120 (H) 70 - 110 mg/dL       Recent Labs   Lab 03/08/20  1549 03/08/20  2034 03/09/20  0841 03/09/20  1248 03/09/20  1705 03/10/20  0931   POCTGLUCOSE 188* 203* 108 161* 207* 120*       Hemoglobin A1C   Date Value Ref Range Status   03/07/2020 6.2 (H) 4.0 - 5.6 % Final     Comment:     ADA Screening Guidelines:  5.7-6.4%  Consistent with prediabetes  >or=6.5%  Consistent with diabetes  High levels of fetal hemoglobin interfere with the HbA1C  assay. Heterozygous hemoglobin variants (HbS, HgC, etc)do  not significantly interfere with this assay.   However, presence of multiple variants may affect accuracy.     08/19/2019  5.6 4.0 - 5.6 % Final     Comment:     ADA Screening Guidelines:  5.7-6.4%  Consistent with prediabetes  >or=6.5%  Consistent with diabetes  High levels of fetal hemoglobin interfere with the HbA1C  assay. Heterozygous hemoglobin variants (HbS, HgC, etc)do  not significantly interfere with this assay.   However, presence of multiple variants may affect accuracy.     01/25/2019 5.6 4.0 - 5.6 % Final     Comment:     ADA Screening Guidelines:  5.7-6.4%  Consistent with prediabetes  >or=6.5%  Consistent with diabetes  High levels of fetal hemoglobin interfere with the HbA1C  assay. Heterozygous hemoglobin variants (HbS, HgC, etc)do  not significantly interfere with this assay.   However, presence of multiple variants may affect accuracy.          Active Hospital Problems    Diagnosis  POA    *Leukocytosis [D72.829]  Yes    Urinary tract infection with hematuria [N39.0, R31.9]  Yes    Type 2 diabetes mellitus with hyperglycemia, with long-term current use of insulin [E11.65, Z79.4]  Not Applicable    Hyperkalemia [E87.5]  Yes    Hyponatremia [E87.1]  Yes    Pressure injury of buttock, unstageable [L89.300]  Yes    Acute idiopathic gout of multiple sites [M10.09]  Yes    Acute kidney injury superimposed on chronic kidney disease [N17.9, N18.9]  Yes    Transaminitis [R74.0]  Yes    Urinary retention [R33.9]  Yes    Decreased functional mobility and endurance [Z74.09]  Yes    COPD, moderate [J44.9]  Yes    Essential hypertension [I10]  Yes    History of CVA (cerebrovascular accident) [Z86.73]  Not Applicable    Morbid obesity with BMI of 45.0-49.9, adult [E66.01, Z68.42]  Not Applicable    Obstructive sleep apnea on CPAP: setting = 8 [G47.33, Z99.89]  Not Applicable      Resolved Hospital Problems   No resolved problems to display.          ASSESSMENT AND PLAN:     Pseudomonas UTI  Significant leukocytosis  - Patient with recent admission for polyarticular gout (discharged on prednisone and colchicine) and  urinary retention s/p recent bryant admitted for worsening leukocytosis of ~65.   - Admitted to hospital medicine for suspected UTI vs wound infection.   - Bryant exchanged in the ED. And urine clean catch as per ED notes.  - Started on bs-abx - vanc and cefepime  - Wound care seen patient and personally seen wound, and wound does not look infected  - Urine with GNR >100K, pending final, d/c vancomycin, now growing pseudomonas  - Abx switched to oral cipro for total 10 days (3/15 end date), discussed with ID and pharmacy   - ID rec CT AP to ro pyelonephritis, no overt pyelo on scan  - Bryant now removed and urinating well   - Remains HDS and afebrile.   - Leucocytosis significantly improved  - Stable for dc to rehab at this time     Polyarticular Gout  - Continue steroid taper and colchicine as per rheum per last admit  - Supportive care     Urinary Retention - resolved   - Patient retaining urine on last admit s/p bryant placement and started tamsulosin   - Bryant removed 2/29, but failed voiding trail and was replaced by overnight RN. No notes in chart or amount retained charted  - Discussed with urology and they rec keeping as he failed multiple voiding trials, and rec outpatient fu at urology clinic for voiding trials.   - D/c-ed patient to rehab with bryant, now with UTI  - Bryant was exchanged this admission in the ED  - Bryant removed, and patient voiding fine  - Continue tamsulosin  - Bladder scan PRN and straight in/outs, avoid bryant placement!    Moisture associated dermatitis in the gluteal region  - present on admission  - wound care seen patient   - wound care recs -Nursing to cleanse sacrum with cleansing wipes and apply Triad ointment BID and prn cleaning to sacrum. Triad ointment provides a hydrophilic environment to promote healing of exposed tissue and prevents breakdown of intact skin.  -wound care recs- Nursing to continue pressure prevention interventions as directed. Please assist pt with turning and  repositioning every 2 hours with the use of a wedge/pillows, float bilateral heels with pillows or heel lift boots to alleviate pressure off bed surface, and apply border foams as needed to protect bony prominences.    Hypomagnesemia - resolved  - Replete and recheck     Generalized weakness  - see above   - PT OT, rehab on discharge     Left carpal tunnel syndrome  - recent left carpal tunnel release  - incisions clean dry and intact without evidence of acute infection  - ortho seen patient on last admission s/p wrist aspiration noting gout  - treat gout as above     COPD, moderate  - Duonebs prn     Obstructive sleep apnea on CPAP  - setting = 8  - CPAP HS       Prophylaxis- Lovenox   Code Status- Full Code   Discharge plan and follow up - back to rehab, he is medically ready for discharge    Guilherme Anderson MD  Hospital Medicine Staff  Pager [phone]

## 2020-03-11 ENCOUNTER — TELEPHONE (OUTPATIENT)
Dept: ORTHOPEDICS | Facility: CLINIC | Age: 70
End: 2020-03-11

## 2020-03-11 LAB
BACTERIA BLD CULT: NORMAL
BACTERIA BLD CULT: NORMAL

## 2020-03-11 NOTE — TELEPHONE ENCOUNTER
Spoke to Clayton from [facility] there is a on call MD and wound care MD in the facility and the patient is still not stabilized enough to be mobile.Per Radha if it is a MD.and they think the sutures are good to remove he can go ahead and take them out.----- Message from Marjorie Cameron sent at 3/11/2020 12:46 PM CDT -----  Contact: clayton /[email or web address]  [facility] /[phone]   Name of Who is Calling:     What is the request in detail:   suture Removal post carpal tunnel Please contact to further discuss and advise      Can the clinic reply by [facility]: no     What Number to Call Back if not in [facility]:  clayton /[facility]  [facility] /[phone]

## 2020-03-11 NOTE — PLAN OF CARE
Future Appointments   Date Time Provider Department Center   3/23/2020 10:15 AM LAB, [facility] [place]   3/30/2020  1:20 PM Azikiwe K. Lombard, MD [facility] MED [place]   4/17/2020  8:00 AM Jasiel Long MD [facility]        03/11/20 1506   Final Note   Assessment Type Final Discharge Note   Anticipated Discharge Disposition Rehab  ([facility])   What phone number can be called within the next 1-3 days to see how you are doing after discharge? [phone]   Hospital Follow Up  Appt(s) scheduled? Yes   Discharge plans and expectations educations in teach back method with documentation complete? Yes   Right Care Referral Info   Post Acute Recommendation SNF / Sub-Acute Rehab  ([facility])

## 2020-03-11 NOTE — DISCHARGE SUMMARY
[facility]  Discharge Summary  Hospital Medicine    Patient Name: Bj Pate Jr.  YOB: 1950    Admit Date: 3/5/2020    Discharge Date and Time: 3/10/2020  3:35 PM    Discharge Attending Physician: Guilherme Anderson MD     Team: [facility] MED G    Reason for Admission:   Chief Complaint   Patient presents with    Abnormal Lab     Tx from [facility] for elevated WBC of 61 today. Recent sx of left carpal tunnel sx. Bed bound pmhx of COPD 91% on RA, stroke no neuro deficits.        Active Hospital Problems    Diagnosis  POA    *Leukocytosis [D72.829]  Yes    Urinary tract infection with hematuria [N39.0, R31.9]  Yes    Type 2 diabetes mellitus with hyperglycemia, with long-term current use of insulin [E11.65, Z79.4]  Not Applicable    Hyperkalemia [E87.5]  Yes    Hyponatremia [E87.1]  Yes    Pressure injury of buttock, unstageable [L89.300]  Yes    Acute idiopathic gout of multiple sites [M10.09]  Yes    Acute kidney injury superimposed on chronic kidney disease [N17.9, N18.9]  Yes    Transaminitis [R74.0]  Yes    Urinary retention [R33.9]  Yes    Decreased functional mobility and endurance [Z74.09]  Yes    COPD, moderate [J44.9]  Yes    Essential hypertension [I10]  Yes    History of CVA (cerebrovascular accident) [Z86.73]  Not Applicable    Morbid obesity with BMI of 45.0-49.9, adult [E66.01, Z68.42]  Not Applicable    Obstructive sleep apnea on CPAP: setting = 8 [G47.33, Z99.89]  Not Applicable      Resolved Hospital Problems   No resolved problems to display.       HPI:   Bj Pate Jr. is a 70M with CVA w/o residual deficit, COPD, HILARIO on CPAP, Gout, DDD of Lumbar, Cervical Radiculopathy, HTN, urinary retention with chronic bryant, and recent admission for polyarticular gout. He was discharged on steroids and colchicine to rehab. He had positive blood cultures from that admission that were contaminate, he was not discharged on antibiotics. LFTs transiently worsened  during admission, suspected from rhabdo. Note his anti smooth muscle ab came back positive but was negligible.      He presents today from rehab for worsening leukocytosis. On discharge 03/02, patient had WBC 14, today 65. He was discharged on prednisone 40 mg daily, but received 80 mg daily while admitted. His highest WBC count during that admission was 22. He denies fever at rehab and only complains of laryngitis symptoms and wound on his buttock. He denies URI symptoms, cough, dysuria, flank pain, abdominal pain, NVD, cancer history, night sweats, weight loss. He continues to progress slowly at rehab.     ED: AFVSS, WBC 65, LA 1.5,  from 434, LFTs mildly elevated but improving, SCr 1.6 from 1.2, UA +, bryant exchanged, CXR clear, CPK 64    Hospital Course:   Bj Pate Jr. is a 70M with CVA w/o residual deficit, COPD, HILARIO on CPAP, Gout, DDD of Lumbar, Cervical Radiculopathy, HTN, and recent admission for polyarticular gout (discharged on prednisone and colchicine) and urinary retention s/p recent bryant admitted for worsening leukocytosis of ~65. Admitted to hospital medicine for suspected UTI vs wound infection. Bryant exchanged in the ED.  Started on bs-abx. Urine with GNR >100K. Wound care seen patient, and wound does not look infected. Remains HDS and afebrile. Leucocytosis resolving. Remains on BS-abx. Bryant removed, and now voiding. Urine grew pseudomonas, changed cefepime to cipro BID for total 10 days. Discussed with ID and they rec CT A/P to ro pyelonephritis concerning his presenting wbc count.  CT A/P without overt pyelonephritis. At this time he is on oral abx, and medically stable for d/c back to rehab. Leukocytosis significantly improved. He will need total 10 days of abx for UTI. He is at high risk for C diff considering his recent infections and abx usage. Also poor mobility, rehab to progressively help movement and reposition to avoid pressure ulcers and immobility. Discharge to rehab.  "Check CMP and CBC in few days.      Principal Problem: Leukocytosis    Other Problems Addressed:  Pseudomonas UTI  Significant leukocytosis  Polyarticular Gout  Urinary Retention - resolved   Moisture associated dermatitis in the gluteal region  Hypomagnesemia - resolved  Generalized weakness  Left carpal tunnel syndrome  COPD, moderate  Obstructive sleep apnea on CPAP    Procedures Performed: * No surgery found *    Special Care, Treatment, and Services Provided: none    Consults: none    Significant Diagnostic Studies:  No results found for: EF  Hemoglobin A1C   Date Value Ref Range Status   03/07/2020 6.2 (H) 4.0 - 5.6 % Final     Comment:     ADA Screening Guidelines:  5.7-6.4%  Consistent with prediabetes  >or=6.5%  Consistent with diabetes  High levels of fetal hemoglobin interfere with the HbA1C  assay. Heterozygous hemoglobin variants (HbS, HgC, etc)do  not significantly interfere with this assay.   However, presence of multiple variants may affect accuracy.     08/19/2019 5.6 4.0 - 5.6 % Final     Comment:     ADA Screening Guidelines:  5.7-6.4%  Consistent with prediabetes  >or=6.5%  Consistent with diabetes  High levels of fetal hemoglobin interfere with the HbA1C  assay. Heterozygous hemoglobin variants (HbS, HgC, etc)do  not significantly interfere with this assay.   However, presence of multiple variants may affect accuracy.       All diagnostic work up reviewed      Final Diagnoses: Same as principal problem.    Discharged Condition: stable  Face to face services were provided on 3/11/2020   Time Spent:  I spent > 30 minutes on the discharge, which included reviewing hospital course with patient/family, reviewing discharge medications, and arranging follow-up care.  Physical Exam on 3/11/2020:  /63 (BP Location: Right arm, Patient Position: Sitting)   Pulse 89   Temp 98.3 °F (36.8 °C) (Oral)   Resp 18   Ht 5' 9" (1.753 m)   Wt (!) 144.9 kg (319 lb 7.1 oz)   SpO2 96%   BMI 47.17 kg/m² "     GEN: AA male in no acute distress. Nontoxic. Resting in bed. Cooperative. Appears comfortable.  HEENT: NCAT. PERRL. EOMI. Conjunctivae/corneas clear, sclera Anicteric.  CVS: RRR. Normal s1 s2 no murmur, click, rub or gallop  LUNG: CTAB. Normal respiratory effort. Anterior exam with no wheezes, rhonchi, or crackles.  ABD: Normoactive BS, soft, NT, ND, no masses or organomegaly.  EXT: Joint swelling noted on multiple UE joints all improving. ROM gradually improving   SKIN: color, texture, turgor normal. No rashes or lesions  NEURO: Alert, oriented x 4, grossly normal    Disposition: Rehab Facility    Follow Up Instructions:   Follow-up Information     Azikiwe K Lombard, MD. Schedule an appointment as soon as possible for a visit in 1 week.    Specialty:  Family Medicine  Why:  Post hospital fu  Contact information:  [address]  [place] [address]  [phone]                 Future Appointments   Date Time Provider Department Center   3/23/2020 10:15 AM LAB, [facility] LAB [place]   3/30/2020  1:20 PM Azikiwe K. Lombard, MD [facility] MED [place]   4/17/2020  8:00 AM Jasiel Long MD [facility] R[facility] [place]       Medications:    Discharge Medication List as of 3/10/2020  2:49 PM      START taking these medications    Details   ciprofloxacin HCl (CIPRO) 750 MG tablet Take 1 tablet (750 mg total) by mouth every 12 (twelve) hours. for 6 days, Starting Tue 3/10/2020, Until Mon 3/16/2020, No Print      polyethylene glycol (GLYCOLAX) 17 gram PwPk Take 17 g by mouth daily as needed., Starting Tue 3/10/2020, No Print         CONTINUE these medications which have CHANGED    Details   lisinopriL 10 MG tablet Take 4 tablets (40 mg total) by mouth once daily., Starting Tue 3/10/2020, Until Wed 3/10/2021, No Print      predniSONE (DELTASONE) 10 MG tablet 10 mg for 7 days then stop, No Print         CONTINUE these medications which have NOT CHANGED    Details   atorvastatin (LIPITOR) 40 MG tablet Take 1 tablet (40 mg total) by  mouth once daily. Hold for 5 days then resume, Starting Sat 3/7/2020, Normal      diltiaZEM (CARTIA XT) 240 MG 24 hr capsule Take 1 capsule (240 mg total) by mouth once daily., Starting Fri 11/15/2019, Normal      gabapentin (NEURONTIN) 600 MG tablet Take 1 tablet (600 mg total) by mouth 3 (three) times daily., Starting Wed 3/27/2019, Normal      insulin detemir U-100 (LEVEMIR FLEXTOUCH) 100 unit/mL (3 mL) SubQ InPn pen Inject 10 Units into the skin 2 (two) times daily., Starting Mon 3/2/2020, Until Tue 3/2/2021, No Print      oxyCODONE (ROXICODONE) 10 mg Tab immediate release tablet Take 1 tablet (10 mg total) by mouth every 6 (six) hours as needed., Starting Mon 3/2/2020, No Print      tamsulosin (FLOMAX) 0.4 mg Cap Take 1 capsule (0.4 mg total) by mouth once daily., Starting Tue 3/3/2020, Until Wed 3/3/2021, Normal      ADVAIR DISKUS 250-50 mcg/dose diskus inhaler Controller, Normal      albuterol (PROAIR HFA) 90 mcg/actuation inhaler Inhale 2 puffs into the lungs every 6 (six) hours as needed for Wheezing. Rescue, Starting Wed 1/15/2020, Normal      ascorbic acid, vitamin C, (VITAMIN C) 500 MG tablet Take 500 mg by mouth once daily., Historical Med      aspirin (ECOTRIN) 81 MG EC tablet Take 81 mg by mouth once daily., Historical Med      blood pressure test kit-large Kit 1 Device by Misc.(Non-Drug; Combo Route) route 2 (two) times daily., Starting Mon 8/28/2017, Normal      blood sugar diagnostic Strp To check BG 2 times daily, to use with insurance preferred meter, Print      blood-glucose meter kit To check BG 2 times daily, to use with insurance preferred meter, Print      COLCRYS 0.6 mg tablet Take 1 tablet (0.6 mg total) by mouth once daily. Daily until you see rheumatology, Starting Mon 3/2/2020, No Print      diclofenac sodium (VOLTAREN) 1 % Gel APPLY  2 GRAMS  TOPICALLY TO AFFECTED AREA ONCE DAILY, Normal      lancets Misc To check BG 2 times daily, to use with insurance preferred meter, Print       vitamin D (VITAMIN D3) 1000 units Tab Take 1,000 Units by mouth once daily., Historical Med             Discharge Instructions:  Discussed in length with patient. If his presenting symptoms were to worsen, or if febrile, he should return to the ED. Patient voiced understanding. He needs to fu with PCP, rheum and ortho with appropriate labs.      Guilherme Anderson MD  Department of Hospital Medicine

## 2020-03-12 LAB — POCT GLUCOSE: 203 MG/DL (ref 70–110)

## 2020-03-26 LAB — FUNGUS SPEC CULT: NORMAL

## 2020-03-27 ENCOUNTER — TELEPHONE (OUTPATIENT)
Dept: FAMILY MEDICINE | Facility: CLINIC | Age: 70
End: 2020-03-27

## 2020-03-27 NOTE — TELEPHONE ENCOUNTER
Patient recently was seen at the hospital. Need follow up visit. Reschedule pt appt from was schedule for 3/20/2020 to next available. Requesting earlier appointment. Offered virtual visit - noted PCP is not taking any ED follow up in office. Patient will setup [facility] and will call back to set up an appt. Notified pt PCP is unable to refill same medication at hospital until seen to be advise. Verbalized understanding. Pt will call back to schedule.

## 2020-03-27 NOTE — TELEPHONE ENCOUNTER
----- Message from Kadie Nichole sent at 3/27/2020  3:03 PM CDT -----  Contact: Self  [phone]  Type:  Patient Returning Call    Who Called: Self     Who Left Message for Patient: Maris PRICE    Does the patient know what this is regarding?: yes     Would the patient rather a call back or a response via [facility]? Call back     Best Call Back Number: [phone]

## 2020-03-27 NOTE — TELEPHONE ENCOUNTER
----- Message from Kadie Nichole sent at 3/27/2020  4:48 PM CDT -----  Contact: Self [phone]  Type:  Patient Returning Call    Who Called: Self     Who Left Message for Patient: Maris ARNOLD     Does the patient know what this is regarding?: yes     Would the patient rather a call back or a response via [facility]? Call back     Best Call Back Number: [phone]    Additional Information: pt. Would like to speak with Ms. Solomon

## 2020-03-30 ENCOUNTER — TELEPHONE (OUTPATIENT)
Dept: ADMINISTRATIVE | Facility: HOSPITAL | Age: 70
End: 2020-03-30

## 2020-03-30 PROCEDURE — G0180 PR HOME HEALTH MD CERTIFICATION: ICD-10-PCS | Mod: ,,, | Performed by: FAMILY MEDICINE

## 2020-03-30 PROCEDURE — G0180 MD CERTIFICATION HHA PATIENT: HCPCS | Mod: ,,, | Performed by: FAMILY MEDICINE

## 2020-03-30 NOTE — TELEPHONE ENCOUNTER
----- Message from Bita Pulido sent at 3/30/2020 11:07 AM CDT -----  Contact: Self   Type: Patient Call Back    Who called: Self     What is the request in detail:patient is trying to do a virtual visit but having trouble. Please call     Can the clinic reply by [facility]? No     Would the patient rather a call back or a response via [facility]? Call     Best call back number:[phone]

## 2020-03-30 NOTE — TELEPHONE ENCOUNTER
Patient states he was unable to operate virtual visit this morning but needs to speak to Dr Lombard when he has time. Nurse asked to patient about scheduling another virtual visit, patient declined.

## 2020-03-31 ENCOUNTER — TELEPHONE (OUTPATIENT)
Dept: FAMILY MEDICINE | Facility: CLINIC | Age: 70
End: 2020-03-31

## 2020-03-31 DIAGNOSIS — Z86.73 HISTORY OF CVA (CEREBROVASCULAR ACCIDENT): ICD-10-CM

## 2020-03-31 DIAGNOSIS — Z86.79 HISTORY OF CEREBRAL PARENCHYMAL HEMORRHAGE: Primary | ICD-10-CM

## 2020-03-31 NOTE — TELEPHONE ENCOUNTER
Spoke with Enriqueta,  with [facility]. States that pt is requesting home health. Pt confirmed, states that he needs someone to come in and help him with ADL's.

## 2020-03-31 NOTE — TELEPHONE ENCOUNTER
Spoke with pt in regards to message. Pt states that he was given pain meds but nothing for his gout on discharge from hospital. Pt states that he is now having a flare up and is wondering if it is ok to take Colcrys more than once a day when he is experiencing a flare up.

## 2020-03-31 NOTE — TELEPHONE ENCOUNTER
----- Message from Sindi Arndt sent at 3/31/2020  1:01 PM CDT -----  Contact: Patient   Type: Patient Call Back    Who called: Patient     What is the request in detail: Pt is requesting a call back in regards to his prescription he received from the ER.     Can the clinic reply by [facility]?    Would the patient rather a call back or a response via [facility]? Call back     Best call back number: [phone]

## 2020-03-31 NOTE — TELEPHONE ENCOUNTER
Advise patient he can take 2 tablets at onset of pain, then 1 tablet in an hour if pain persist.  Do not take more than 3 pills in a day.

## 2020-04-01 NOTE — PT/OT/SLP DISCHARGE
Occupational Therapy Discharge Summary    Bj Pate Jr.  MRN: [id number]   Principal Problem: Leukocytosis      Patient Discharged from acute Occupational Therapy on 4/1/2020.  Please refer to prior OT note for functional status.    Assessment:      Patient appropriate for care in another setting.    Objective:     GOALS:   Multidisciplinary Problems     Occupational Therapy Goals        Problem: Occupational Therapy Goal    Goal Priority Disciplines Outcome Interventions   Occupational Therapy Goal     OT, PT/OT Ongoing, Progressing    Description:  Goals to be met by: 3/20/20     Patient will increase functional independence with ADLs by performing:    LE Dressing with Moderate Assistance and AE PRN.  Grooming while standing with Minimum Assistance. Downgrade  Grooming while seated in BSC with set up assist only.   Toileting from toilet with Maximum Assistance for hygiene and clothing management.   Toilet transfer to toilet with Contact Guard Assistance.  UB Bathing with min A                        Reasons for Discontinuation of Therapy Services  Transfer to alternate level of care.      Plan:     Patient Discharged to: Inpatient Rehab    DENICE Martin  4/1/2020

## 2020-04-02 ENCOUNTER — TELEPHONE (OUTPATIENT)
Dept: FAMILY MEDICINE | Facility: CLINIC | Age: 70
End: 2020-04-02

## 2020-04-07 ENCOUNTER — TELEPHONE (OUTPATIENT)
Dept: FAMILY MEDICINE | Facility: CLINIC | Age: 70
End: 2020-04-07

## 2020-04-07 NOTE — TELEPHONE ENCOUNTER
----- Message from Janett Franco sent at 4/6/2020  2:02 PM CDT -----  Contact: Nichole  Name of Who is Calling:Nichole [facility]        What is the request in detail:  nichole from [facility] she states the pharmacy has some question concerning the prednisone 20 given 3 times per day she needs to know if this was written correctly       Can the clinic reply by [facility]: no      What Number to Call Back if not in [facility]:[phone]

## 2020-04-08 NOTE — TELEPHONE ENCOUNTER
There is no record of this in Epic, may have prescribed by another physician.  I have no further information.

## 2020-04-14 ENCOUNTER — TELEPHONE (OUTPATIENT)
Dept: FAMILY MEDICINE | Facility: CLINIC | Age: 70
End: 2020-04-14

## 2020-04-14 DIAGNOSIS — G47.33 OBSTRUCTIVE SLEEP APNEA ON CPAP: Primary | ICD-10-CM

## 2020-04-14 NOTE — TELEPHONE ENCOUNTER
----- Message from Shanna Zaragoza sent at 4/13/2020  1:00 PM CDT -----  Contact: Ac / [employer]   Name of Who is Calling: [facility]     What is the request in detail:  Ac with [employer] [facility] called asking to please fax over orders for CPAP supplies and clinical notes for the above patient        Call Back :  [facility] / [employer] / # [phone] / fax# [phone]

## 2020-04-15 ENCOUNTER — EXTERNAL HOME HEALTH (OUTPATIENT)
Dept: HOME HEALTH SERVICES | Facility: HOSPITAL | Age: 70
End: 2020-04-15
Payer: MEDICARE

## 2020-04-15 ENCOUNTER — PATIENT MESSAGE (OUTPATIENT)
Dept: RHEUMATOLOGY | Facility: CLINIC | Age: 70
End: 2020-04-15

## 2020-04-16 ENCOUNTER — PATIENT MESSAGE (OUTPATIENT)
Dept: RHEUMATOLOGY | Facility: CLINIC | Age: 70
End: 2020-04-16

## 2020-04-16 ENCOUNTER — TELEPHONE (OUTPATIENT)
Dept: RHEUMATOLOGY | Facility: CLINIC | Age: 70
End: 2020-04-16

## 2020-04-16 NOTE — PROGRESS NOTES
Subjective:       Patient ID: Bj Pate Jr. is a 70 y.o. male.    Chief Complaint: No chief complaint on file.    HPI   Initial history; from hospital HPI on 3/2020  Mr. Pate is a 71 yo male with past medical history of morbid obesity, COPD, Gout, HTN, CVA with no residual deficits, CKD(baseline Scr 1.5), and HILARIO on CPAP who presents to [facility] on 2/27 with cief complaint of generalized weakness which has been progressively worsening over the past week. The patient says he had carpal tunnel release surgery on 2/19 when it seems all of his symptoms started. His weakness began with difficulty getting out of a chair and moving around and now he cannot move at all. He also had pain and swelling in multiple joints--particularly the wrist, small joints of bilateral hands, elbows, knees, and ankles. He has significant pain, redness, and swelling in his left wrist where he had surgery. He has a history of gout--he says he gets flares about once every month usually monoarticular in his big toe. He has never had arthrocentesis performed and says his gout was a clinical diagnosis. Other symptoms has been having are decreased urine output characterized by feeling he has to urinate but cant with some occasional incontinence, fatigue, and decreased PO intake.  He denies any sick contacts. He denies any diarrhea, nausea, vomiting, fevers, chills, shortness of breath, loss of sensation anywhere, dysuria, headaches, confusion, myalgias.      Of note he presented to the ED on 2/24 for urinary incontinence and although UA not consistent with UTI was sent home with course of bactrim. He says he did not take this medication. He represented on 2/25 again but this time for diffuse arthralgias and generalized weakness but was sent home again after initial workup was negative.     On arrival here, he was febrile with Tm 100.7, tachycardic in low 100s, SBPs on softer side as low as 90s, satting normally on room air. His labs were  significant for elevated WBC 17K, H/H 10.1/32.7, CMP with low albumin 2.2, AST/ and 133, Tbili 2.3, elevated inflammatory markers wit ESR >120 and high-sensitivity, , CK 2681, uric acid 11.4, normal TSH, UA with 2+ blood but 1 RBC. They obtained imaging of his head and sine which showed no acute abnormality. They also obtained abdominal ultrasound which showed hepatosteatosis.     His ESR and high-sensitivity CRP are significantly elevated at >120 and 416 respectively. He also has elevated uric acid. He has multiple joints--primarily his hands, feet, knees, and ankles which are warm, swollen, and exquisitely tender. He is s/p arthrocentesis of his right knee which revealed monosodium urate crystals(pictured above) consistent with polyarticular gout. He has been developing fevers since admission which can occur in the setting of gout. It is possible his symptoms of weakness could be related to pain. It is unclear why his CK levels are elevated but low suspicion for inflammatory myopathy given the acuity of his presentation. His CPKs have downtrend with fluids   Synovial fluid of right knee with 26963 WBCs, 99% segs. Cultures without growth. Left wrist aspiration by ortho: chalk white material + urate crystals s/p steroid injection into joint. He was given 2 doses of IV solumedrol 80 mg patient has significant improvement in his pain and ability to move his extremities. He was transitioned to prednisone 40mg daily with plan to continue for one week then decrease by 10mg per week. He was started on colchicine 0.6mg daily     Interval history:  Was discharged to Rehab facility. Re-hospitalized 3/5 to 3/10 for UTI.  He was discharged from rehab facility about 3 weeks ago. He did not receive prednisone in the rehab facility. In the rehab he is not sure if he got the colchicine. After discharge he has been taking 0.6mg daily colchicine   He still has left foot and ankle swelling. No pain, no erythema. He  walks with walker, he has been getting PT at home twice a week.   He denies any fever, chills, cough, shortness of breath, abd pain, no diarrhea.   He has been staying at home, washing hands well.     Labs   3/10: wct 27, hgb 11.9, plt 410, low Na 129, K 5.2, Cr 1.3 GFR of >60  Last uric acid Feb 27 2020 of 11.4  Repeat CPK 3/5 64    [facility]       Review of Systems   Constitutional: Negative for fever and unexpected weight change.   HENT: Negative for trouble swallowing.    Eyes: Negative for redness.   Respiratory: Negative for cough and shortness of breath.    Cardiovascular: Negative for chest pain.   Gastrointestinal: Negative for constipation and diarrhea.   Genitourinary: Negative for genital sores.   Skin: Negative for rash.   Neurological: Negative for headaches.   Hematological: Does not bruise/bleed easily.         Objective:   There were no vitals taken for this visit.     Physical Exam   Unable to due since telephone visit   No data to display     Assessment:       1. Gout, unspecified cause, unspecified chronicity, unspecified site    2. Obesity, unspecified classification, unspecified obesity type, unspecified whether serious comorbidity present    3. Hypertension, unspecified type    4. CKD (chronic kidney disease) stage 2, GFR 60-89 ml/min        Mr. Pate is a 71 yo male with past medical history of morbid obesity, COPD, HTN, CVA with no residual deficits, CKD(baseline Scr 1.5), and HILARIO on CPAP, DJD of lumbar and cervical spine, urinary retention with bryant here for follow up of gout.   Feb 27 admitted for sever polyarticular gout flare. Synovial fluid of right knee with 11751 WBCs, 99% segs. Cultures without growth, positive urate crystal. Left wrist aspiration by ortho: chalk white material + urate crystals. Uric acid of in feb 2020 of 11.4    Plan:       #Polyarticular gout  -goal uric acid would be <6 for him  -will need xrays in the near future once COVID pandemic settles down  in June  -labs ASAP and monthly to trend  -continue colchicine 0.6mg daily for now   -will get HLA  and once we have this will start allopurinol 100mg daily       Patient seen and discussed with Dr. Mayfield    RTC in June    Jasiel Long MD  Rheumatology PGY 5        Problem List Items Addressed This Visit     None      Visit Diagnoses     Gout, unspecified cause, unspecified chronicity, unspecified site    -  Primary    Obesity, unspecified classification, unspecified obesity type, unspecified whether serious comorbidity present        Hypertension, unspecified type        CKD (chronic kidney disease) stage 2, GFR 60-89 ml/min                    The patient location is: home  The chief complaint leading to consultation is: gout  Visit type: audio only  Total time spent with patient: 25min   Each patient to whom he or she provides medical services by telemedicine is:  (1) informed of the relationship between the physician and patient and the respective role of any other health care provider with respect to management of the patient; and (2) notified that he or she may decline to receive medical services by telemedicine and may withdraw from such care at any time.    Notes: see above

## 2020-04-17 ENCOUNTER — OFFICE VISIT (OUTPATIENT)
Dept: RHEUMATOLOGY | Facility: CLINIC | Age: 70
End: 2020-04-17
Payer: MEDICARE

## 2020-04-17 DIAGNOSIS — N18.2 CKD (CHRONIC KIDNEY DISEASE) STAGE 2, GFR 60-89 ML/MIN: ICD-10-CM

## 2020-04-17 DIAGNOSIS — M10.9 GOUT, UNSPECIFIED CAUSE, UNSPECIFIED CHRONICITY, UNSPECIFIED SITE: Primary | ICD-10-CM

## 2020-04-17 DIAGNOSIS — I10 HYPERTENSION, UNSPECIFIED TYPE: ICD-10-CM

## 2020-04-17 DIAGNOSIS — E66.9 OBESITY, UNSPECIFIED CLASSIFICATION, UNSPECIFIED OBESITY TYPE, UNSPECIFIED WHETHER SERIOUS COMORBIDITY PRESENT: ICD-10-CM

## 2020-04-17 PROCEDURE — 99443 PR PHYSICIAN TELEPHONE EVALUATION 21-30 MIN: CPT | Mod: 95,,, | Performed by: STUDENT IN AN ORGANIZED HEALTH CARE EDUCATION/TRAINING PROGRAM

## 2020-04-17 PROCEDURE — 99443 PR PHYSICIAN TELEPHONE EVALUATION 21-30 MIN: ICD-10-PCS | Mod: 95,,, | Performed by: STUDENT IN AN ORGANIZED HEALTH CARE EDUCATION/TRAINING PROGRAM

## 2020-04-17 RX ORDER — COLCHICINE 0.6 MG/1
0.6 TABLET ORAL DAILY
Qty: 90 TABLET | Refills: 11 | Status: SHIPPED | OUTPATIENT
Start: 2020-04-17 | End: 2021-04-17

## 2020-04-17 NOTE — PROGRESS NOTES
70 year old male with many comorbidities with gout    Knee/wrist involvement while in hospital    Solumedrol given for 2 days  Prednisone 40 mg x 7 days  Taper  Colchicine 0.6 mg daily     Labs today    CBC,CMP,Uric acid every 4 weeks     [id number]    Xrays later     rtc in June         Answers for HPI/ROS submitted by the patient on 4/16/2020   fever: No  eye redness: No  headaches: No  shortness of breath: No  chest pain: No  trouble swallowing: No  diarrhea: No  constipation: No  unexpected weight change: No  genital sore: No  During the last 3 days, have you had a skin rash?: No  Bruises or bleeds easily: No  cough: No

## 2020-04-21 ENCOUNTER — TELEPHONE (OUTPATIENT)
Dept: RHEUMATOLOGY | Facility: CLINIC | Age: 70
End: 2020-04-21

## 2020-04-21 DIAGNOSIS — E78.5 DYSLIPIDEMIA: ICD-10-CM

## 2020-04-21 RX ORDER — ALBUTEROL SULFATE 90 UG/1
2 AEROSOL, METERED RESPIRATORY (INHALATION) EVERY 6 HOURS PRN
Qty: 18 G | Refills: 5 | Status: SHIPPED | OUTPATIENT
Start: 2020-04-21 | End: 2021-09-02

## 2020-04-21 RX ORDER — TAMSULOSIN HYDROCHLORIDE 0.4 MG/1
0.4 CAPSULE ORAL DAILY
Qty: 30 CAPSULE | Refills: 11 | Status: SHIPPED | OUTPATIENT
Start: 2020-04-21 | End: 2021-04-06

## 2020-04-21 RX ORDER — TAMSULOSIN HYDROCHLORIDE 0.4 MG/1
0.4 CAPSULE ORAL DAILY
Qty: 30 CAPSULE | Refills: 11 | OUTPATIENT
Start: 2020-04-21 | End: 2021-04-21

## 2020-04-21 RX ORDER — ATORVASTATIN CALCIUM 40 MG/1
40 TABLET, FILM COATED ORAL DAILY
Qty: 90 TABLET | Refills: 1 | Status: SHIPPED | OUTPATIENT
Start: 2020-04-21 | End: 2020-07-30 | Stop reason: SDUPTHER

## 2020-04-21 RX ORDER — ATORVASTATIN CALCIUM 40 MG/1
40 TABLET, FILM COATED ORAL DAILY
Qty: 90 TABLET | Refills: 1 | OUTPATIENT
Start: 2020-04-21

## 2020-04-21 NOTE — TELEPHONE ENCOUNTER
----- Message from Kadie Nichole sent at 4/21/2020  3:45 PM CDT -----  Contact: Self [phone]  Type: RX Refill Request    Who Called: Self     Have you contacted your pharmacy: yes     Refill or New Rx: refill     RX Name and Strength: albuterol (PROAIR HFA) 90 mcg/actuation inhaler & ADVAIR DISKUS 250-50 mcg/dose diskus inhaler    Is this a 30 day or 90 day RX: 90 day     Preferred Pharmacy with phone number: [facility] #[id number] [address] AT [address] [phone] (Phone)  [phone] (Fax)    Local or Mail Order: local     Would the patient rather a call back or a response via [facility]? Call back     Best Call Back Number: [phone]

## 2020-04-21 NOTE — TELEPHONE ENCOUNTER
----- Message from Kadie Nichole sent at 4/21/2020  3:51 PM CDT -----  Contact: Self [phone]  Type: RX Refill Request    Who Called: Self     Have you contacted your pharmacy: yes     Refill or New Rx: refill     RX Name and Strength: atorvastatin (LIPITOR) 40 MG tablet & tamsulosin (FLOMAX) 0.4 mg Cap 30 capsule     Is this a 30 day or 90 day RX: 90 day     Preferred Pharmacy with phone number: [facility] #[id number] [place] [address] AT [address] [phone] (Phone)  [phone] (Fax)    Local or Mail Order: local     Would the patient rather a call back or a response via [facility]? Call back     Best Call Back Number: [phone]

## 2020-04-29 ENCOUNTER — TELEPHONE (OUTPATIENT)
Dept: FAMILY MEDICINE | Facility: CLINIC | Age: 70
End: 2020-04-29

## 2020-04-29 NOTE — TELEPHONE ENCOUNTER
----- Message from Zoila Chow MA sent at 4/28/2020  4:50 PM CDT -----  Contact: Patient   Pt is requesting referral for occuptational physical therapy  ----- Message -----  From: Sindi Arndt  Sent: 4/28/2020   3:34 PM CDT  To: Lombard Azikiwe K Staff    Type: Patient Call Back    Who called: Patient     What is the request in detail: Pt is requesting orders for occupational orders physical therapy     Can the clinic reply by [facility]?    Would the patient rather a call back or a response via [facility]? Call back     Best call back number: [phone]

## 2020-04-30 ENCOUNTER — TELEPHONE (OUTPATIENT)
Dept: FAMILY MEDICINE | Facility: CLINIC | Age: 70
End: 2020-04-30

## 2020-04-30 NOTE — TELEPHONE ENCOUNTER
Spoke with patient asking if he can go back on Lisinopril-HCTZ 20-25 mg he was taking the pill went he was on Reha. He's only taking Diltiazem 240 , medication is not on patient profile , please advise.

## 2020-04-30 NOTE — TELEPHONE ENCOUNTER
----- Message from Teri Marquez sent at 4/30/2020 12:31 PM CDT -----  Contact: JEAN TRINIDAD JR. [[id number]]  Name of Who is Calling : JEAN TRINIDAD JR. [[id number]]    Patient is requesting a call from staff in regards to needing some clarity on medication   .....Please contact to further discuss and advise.    Can the clinic reply by [facility] : No    What Number to Call Back :  [phone]

## 2020-04-30 NOTE — TELEPHONE ENCOUNTER
Per [facility] pt with [facility], all OT and PT visits that were approved have been used; pt will need new orders as to why he needs more therapy

## 2020-05-01 LAB
ACID FAST MOD KINY STN SPEC: NORMAL
MYCOBACTERIUM SPEC QL CULT: NORMAL

## 2020-05-04 ENCOUNTER — TELEPHONE (OUTPATIENT)
Dept: FAMILY MEDICINE | Facility: CLINIC | Age: 70
End: 2020-05-04

## 2020-05-04 DIAGNOSIS — M10.9 GOUTY ARTHRITIS: Primary | ICD-10-CM

## 2020-05-04 DIAGNOSIS — J44.9 COPD, MODERATE: ICD-10-CM

## 2020-05-04 NOTE — TELEPHONE ENCOUNTER
----- Message from Margarita Walker sent at 5/4/2020  4:46 PM CDT -----  Contact: Self  Type: Patient Call Back    Who called:Bj    What is the request in detail:Patient called to request orders for ot/pt. Please call to advise    Can the clinic reply by [facility]?    Would the patient rather a call back or a response via [facility]? Call    Best call back number:[phone]

## 2020-05-04 NOTE — TELEPHONE ENCOUNTER
Spoke with patient requesting now Out Patient PT AND OT , he finish last week in patient PT and OT , was told patient request will sent to provider . Patient verbalized understand .

## 2020-05-04 NOTE — TELEPHONE ENCOUNTER
----- Message from Larisa Garcia sent at 5/4/2020  3:35 PM CDT -----  Type: RX Refill Request    Who Called: pt    Have you contacted your pharmacy: no    Refill or New Rx: refill     RX Name and Strength: ADVAIR DISKUS 250-50 mcg/dose diskus inhaler    How is the patient currently taking it? (ex. 1XDay):    Is this a 30 day or 90 day RX:    Preferred Pharm[facility] with phone number: ..  [id number] [place] [place] [facility] AT [address] [place] [address] [address]  Phone: [phone] Fax: [phone]    Local or Mail Order: local    Ordering Provider:    Would the patient rather a call back or a response via [facility]? Call back     Best Call Back Number: [phone]    Additional Information:

## 2020-05-05 RX ORDER — FLUTICASONE PROPIONATE AND SALMETEROL 50; 250 UG/1; UG/1
1 POWDER RESPIRATORY (INHALATION) 2 TIMES DAILY
Qty: 60 EACH | Refills: 3 | Status: SHIPPED | OUTPATIENT
Start: 2020-05-05 | End: 2020-07-30 | Stop reason: SDUPTHER

## 2020-05-06 ENCOUNTER — TELEPHONE (OUTPATIENT)
Dept: FAMILY MEDICINE | Facility: CLINIC | Age: 70
End: 2020-05-06

## 2020-05-06 NOTE — TELEPHONE ENCOUNTER
----- Message from Linda Campbell sent at 5/6/2020  1:04 PM CDT -----  Contact: Genekacie [phone]  Type: Patient Call Back    Who called:Bj    What is the request in detail: The patient called to notify the staff that his medication: fluticasone propion/salmeterol 250-50 mcg/dose. He stated that the pharmacy told him they did not receive it. He would like it resent if possible.    Can the clinic reply by [facility]?no    Would the patient rather a call back or a response via [facility]? Call back     Best call back number: [phone]

## 2020-05-06 NOTE — TELEPHONE ENCOUNTER
"----- Message from Trinity Womack sent at 5/6/2020  9:58 AM CDT -----  Contact: Nichole "home health nurse"  [phone]  Type: Patient Call Back    Who called: Nichole "home health nurse"    What is the request in detail: calling in regards to patient requesting orders for outpatient physical and occupational therapy    Can the clinic reply by [facility]? Call back    Would the patient rather a call back or a response via [facility]? Call back    Best call back number: [phone]    "

## 2020-05-22 ENCOUNTER — PATIENT MESSAGE (OUTPATIENT)
Dept: RHEUMATOLOGY | Facility: CLINIC | Age: 70
End: 2020-05-22

## 2020-05-26 ENCOUNTER — OFFICE VISIT (OUTPATIENT)
Dept: FAMILY MEDICINE | Facility: CLINIC | Age: 70
End: 2020-05-26
Payer: MEDICARE

## 2020-05-26 VITALS
HEART RATE: 95 BPM | DIASTOLIC BLOOD PRESSURE: 70 MMHG | BODY MASS INDEX: 43.76 KG/M2 | SYSTOLIC BLOOD PRESSURE: 126 MMHG | WEIGHT: 295.44 LBS | OXYGEN SATURATION: 98 % | RESPIRATION RATE: 20 BRPM | TEMPERATURE: 99 F | HEIGHT: 69 IN

## 2020-05-26 DIAGNOSIS — R53.81 PHYSICAL DEBILITY: ICD-10-CM

## 2020-05-26 DIAGNOSIS — M1A.09X0 IDIOPATHIC CHRONIC GOUT OF MULTIPLE SITES WITHOUT TOPHUS: ICD-10-CM

## 2020-05-26 DIAGNOSIS — I10 ESSENTIAL HYPERTENSION: Primary | ICD-10-CM

## 2020-05-26 PROCEDURE — 3074F SYST BP LT 130 MM HG: CPT | Mod: CPTII,S$GLB,, | Performed by: FAMILY MEDICINE

## 2020-05-26 PROCEDURE — 1101F PT FALLS ASSESS-DOCD LE1/YR: CPT | Mod: CPTII,S$GLB,, | Performed by: FAMILY MEDICINE

## 2020-05-26 PROCEDURE — 99999 PR PBB SHADOW E&M-EST. PATIENT-LVL IV: ICD-10-PCS | Mod: PBBFAC,,, | Performed by: FAMILY MEDICINE

## 2020-05-26 PROCEDURE — 99499 RISK ADDL DX/OHS AUDIT: ICD-10-PCS | Mod: S$GLB,,, | Performed by: FAMILY MEDICINE

## 2020-05-26 PROCEDURE — 1159F PR MEDICATION LIST DOCUMENTED IN MEDICAL RECORD: ICD-10-PCS | Mod: S$GLB,,, | Performed by: FAMILY MEDICINE

## 2020-05-26 PROCEDURE — 99999 PR PBB SHADOW E&M-EST. PATIENT-LVL IV: CPT | Mod: PBBFAC,,, | Performed by: FAMILY MEDICINE

## 2020-05-26 PROCEDURE — 3078F PR MOST RECENT DIASTOLIC BLOOD PRESSURE < 80 MM HG: ICD-10-PCS | Mod: CPTII,S$GLB,, | Performed by: FAMILY MEDICINE

## 2020-05-26 PROCEDURE — 1125F AMNT PAIN NOTED PAIN PRSNT: CPT | Mod: S$GLB,,, | Performed by: FAMILY MEDICINE

## 2020-05-26 PROCEDURE — 99214 PR OFFICE/OUTPT VISIT, EST, LEVL IV, 30-39 MIN: ICD-10-PCS | Mod: S$GLB,,, | Performed by: FAMILY MEDICINE

## 2020-05-26 PROCEDURE — 1159F MED LIST DOCD IN RCRD: CPT | Mod: S$GLB,,, | Performed by: FAMILY MEDICINE

## 2020-05-26 PROCEDURE — 1125F PR PAIN SEVERITY QUANTIFIED, PAIN PRESENT: ICD-10-PCS | Mod: S$GLB,,, | Performed by: FAMILY MEDICINE

## 2020-05-26 PROCEDURE — 1101F PR PT FALLS ASSESS DOC 0-1 FALLS W/OUT INJ PAST YR: ICD-10-PCS | Mod: CPTII,S$GLB,, | Performed by: FAMILY MEDICINE

## 2020-05-26 PROCEDURE — 3074F PR MOST RECENT SYSTOLIC BLOOD PRESSURE < 130 MM HG: ICD-10-PCS | Mod: CPTII,S$GLB,, | Performed by: FAMILY MEDICINE

## 2020-05-26 PROCEDURE — 3078F DIAST BP <80 MM HG: CPT | Mod: CPTII,S$GLB,, | Performed by: FAMILY MEDICINE

## 2020-05-26 PROCEDURE — 99214 OFFICE O/P EST MOD 30 MIN: CPT | Mod: S$GLB,,, | Performed by: FAMILY MEDICINE

## 2020-05-26 PROCEDURE — 99499 UNLISTED E&M SERVICE: CPT | Mod: S$GLB,,, | Performed by: FAMILY MEDICINE

## 2020-05-26 RX ORDER — DILTIAZEM HYDROCHLORIDE 240 MG/1
240 CAPSULE, COATED, EXTENDED RELEASE ORAL DAILY
Qty: 90 CAPSULE | Refills: 1 | Status: SHIPPED | OUTPATIENT
Start: 2020-05-26 | End: 2020-11-19 | Stop reason: SDUPTHER

## 2020-05-26 RX ORDER — HYDROCHLOROTHIAZIDE 25 MG/1
25 TABLET ORAL DAILY
Qty: 90 TABLET | Refills: 1 | Status: SHIPPED | OUTPATIENT
Start: 2020-05-26 | End: 2020-11-19 | Stop reason: SDUPTHER

## 2020-05-26 NOTE — PROGRESS NOTES
Chief Complaint   Patient presents with    Hypertension     follow up     Consult     getting rehab    Leg Swelling     both leg     Medication Management       Bj Pate Jr. is a 70 y.o. male who presents per the Chief Complaint.  Pt is known to me and was last seen by me on 11/15/2019.  All known chronic medical issues have been documented.    Hypertension   This is a chronic problem. The current episode started more than 1 year ago. The problem has been gradually worsening since onset. The problem is uncontrolled. Pertinent negatives include no anxiety, blurred vision, chest pain, headaches, malaise/fatigue, neck pain, orthopnea, palpitations, peripheral edema, PND, shortness of breath or sweats. Agents associated with hypertension include amphetamines. Risk factors for coronary artery disease include obesity, male gender, dyslipidemia and sedentary lifestyle. Past treatments include ACE inhibitors, calcium channel blockers and diuretics. The current treatment provides moderate improvement. Compliance problems include exercise.  There is no history of angina, kidney disease (improved), CAD/MI, CVA, heart failure, left ventricular hypertrophy, PVD or retinopathy. Identifiable causes of hypertension include sleep apnea. There is no history of chronic renal disease (improved), coarctation of the aorta, hyperaldosteronism, hypercortisolism, hyperparathyroidism, a hypertension causing med, pheochromocytoma, renovascular disease or a thyroid problem.       ROS  Review of Systems   Constitutional: Positive for activity change and unexpected weight change. Negative for appetite change, chills, diaphoresis, fatigue, fever and malaise/fatigue.   HENT: Negative.  Negative for congestion, ear pain, hearing loss, nosebleeds, postnasal drip, rhinorrhea, sinus pressure, sneezing, sore throat and trouble swallowing.    Eyes: Negative for blurred vision, pain, discharge and visual disturbance.   Respiratory: Positive for  "wheezing. Negative for cough, choking, chest tightness and shortness of breath.    Cardiovascular: Negative for chest pain, palpitations, orthopnea, leg swelling and PND.   Gastrointestinal: Negative for abdominal pain, blood in stool, constipation, diarrhea, nausea and vomiting.   Endocrine: Negative for polydipsia and polyuria.   Genitourinary: Negative for difficulty urinating, dysuria, frequency, hematuria and urgency.   Musculoskeletal: Positive for arthralgias (multiple joint swelling and pain; improved), gait problem and joint swelling (resolved). Negative for back pain, myalgias and neck pain.   Skin: Negative.    Allergic/Immunologic: Negative for environmental allergies and food allergies.   Neurological: Positive for weakness. Negative for dizziness, seizures, syncope, light-headedness and headaches.   Psychiatric/Behavioral: Negative.  Negative for confusion, decreased concentration, dysphoric mood and sleep disturbance. The patient is not nervous/anxious.        Physical Exam  Vitals:    05/26/20 1502   BP: 126/70   Pulse: 95   Resp: 20   Temp: 98.9 °F (37.2 °C)    Body mass index is 43.63 kg/m².  Weight: 134 kg (295 lb 6.7 oz)   Height: 5' 9" (175.3 cm)     Physical Exam   Constitutional: He is oriented to person, place, and time. He appears well-developed and well-nourished. He is active and cooperative.  Non-toxic appearance. He does not have a sickly appearance. He does not appear ill. No distress.   HENT:   Head: Normocephalic and atraumatic.   Right Ear: Hearing and external ear normal. No decreased hearing is noted.   Left Ear: Hearing and external ear normal. No decreased hearing is noted.   Nose: Nose normal. No rhinorrhea or nasal deformity.   Mouth/Throat: Uvula is midline and oropharynx is clear and moist. He does not have dentures. Normal dentition.   Eyes: Pupils are equal, round, and reactive to light. Conjunctivae, EOM and lids are normal. Right eye exhibits no chemosis, no discharge and " no exudate. No foreign body present in the right eye. Left eye exhibits no chemosis, no discharge and no exudate. No foreign body present in the left eye. No scleral icterus.   Neck: Normal range of motion and full passive range of motion without pain. Neck supple.   Cardiovascular: Normal rate, regular rhythm, S1 normal, S2 normal and normal heart sounds. Exam reveals no gallop and no friction rub.   No murmur heard.  Pulmonary/Chest: Effort normal and breath sounds normal. No accessory muscle usage. No respiratory distress. He has no decreased breath sounds. He has no wheezes. He has no rhonchi. He has no rales.   Abdominal: Soft. Normal appearance. He exhibits no distension. There is no hepatosplenomegaly. There is no tenderness. There is no rigidity, no rebound and no guarding.   Musculoskeletal: Normal range of motion.        Right ankle: He exhibits swelling. He exhibits normal range of motion, no ecchymosis, no deformity, no laceration and normal pulse. No tenderness. No lateral malleolus, no medial malleolus, no AITFL, no CF ligament, no posterior TFL, no head of 5th metatarsal and no proximal fibula tenderness found.        Left ankle: He exhibits swelling. He exhibits normal range of motion, no ecchymosis, no deformity, no laceration and normal pulse. No tenderness. No lateral malleolus, no medial malleolus, no AITFL, no CF ligament, no posterior TFL, no head of 5th metatarsal and no proximal fibula tenderness found.        Cervical back: He exhibits tenderness and pain. He exhibits normal range of motion, no bony tenderness, no swelling, no edema, no deformity, no laceration, no spasm and normal pulse.        Right hand: He exhibits normal range of motion, no tenderness, no bony tenderness, normal two-point discrimination, normal capillary refill, no deformity, no laceration and no swelling. Decreased sensation noted. Normal strength noted.        Left hand: He exhibits normal range of motion, no  tenderness, no bony tenderness, normal two-point discrimination, normal capillary refill, no deformity, no laceration and no swelling. Decreased sensation noted. Normal strength noted.        Hands:  Fingertips with decreased sensation; capillary refill appears normal.   Neurological: He is alert and oriented to person, place, and time. He has normal strength. No cranial nerve deficit or sensory deficit. He exhibits normal muscle tone. He displays no seizure activity. Coordination and gait normal.   Skin: Skin is warm, dry and intact. No rash noted. He is not diaphoretic.   Psychiatric: He has a normal mood and affect. His speech is normal and behavior is normal. Judgment and thought content normal. Cognition and memory are normal. He is attentive.       Assessment & Plan    Discussion of plan of care including treatment options regarding health and wellness were reviewed and discussed with patient.  Any changes to medication or treatment plan, as well as any screening blood test, imaging, or referrals to specialist, are documented.  Follow up as indicated.     1. Essential hypertension  Patient was counseled and encouraged to maintain a low sodium diet, as well as increasing physical activity.  Recommend random BP checks at home on a regular basis.  Repeat BP at end of visit was not necessary. Will continue medication at this time, and follow up in 3-6 months, or sooner if blood pressure begins to increase.  Will restart HCTZ; discussed risk associated with gout.  - diltiaZEM (CARTIA XT) 240 MG 24 hr capsule; Take 1 capsule (240 mg total) by mouth once daily.  Dispense: 90 capsule; Refill: 1  - hydroCHLOROthiazide (HYDRODIURIL) 25 MG tablet; Take 1 tablet (25 mg total) by mouth once daily.  Dispense: 90 tablet; Refill: 1    2. Idiopathic chronic gout of multiple sites without tophus  Referral to appropriate specialist for evaluation and management placed in Epic.  Will treat with oral steroids if severe symptoms  return.  Patient with long usage of HCTZ with no complications.  - Ambulatory referral/consult to Physical/Occupational Therapy; Future    3. Physical debility  Referral to appropriate specialist for evaluation and management placed in Epic.   - Ambulatory referral/consult to Physical/Occupational Therapy; Future       Follow up in about 2 months (around 7/26/2020).      ACTIVE MEDICAL ISSUES:  Documented in Problem List    PAST MEDICAL HISTORY  Documented  .  PAST SURGICAL HISTORY:  Documented    SOCIAL HISTORY:  Documented    FAMILY HISTORY:  Documented    ALLERGIES AND MEDICATIONS: updated and reviewed.  Documented    Health Maintenance       Date Due Completion Date    Shingles Vaccine (1 of 2) 01/21/2002 ---    Foot Exam 01/30/2019 1/30/2018    Hemoglobin A1c 06/07/2020 3/7/2020    Override on 4/8/2016: Done (future)    Override on 1/4/2016: Done (future)    Override on 8/22/2014: Done    Lipid Panel 08/19/2020 8/19/2019    Override on 8/22/2014: Done    Fecal Occult Blood Test (FOBT)/[employer] 08/20/2020 8/20/2019    Eye Exam 11/27/2020 11/27/2019    Override on 8/22/2014: Done    TETANUS VACCINE 02/19/2026 2/19/2016

## 2020-05-29 PROCEDURE — G0179 MD RECERTIFICATION HHA PT: HCPCS | Mod: ,,, | Performed by: FAMILY MEDICINE

## 2020-05-29 PROCEDURE — G0179 PR HOME HEALTH MD RECERTIFICATION: ICD-10-PCS | Mod: ,,, | Performed by: FAMILY MEDICINE

## 2020-06-09 ENCOUNTER — CLINICAL SUPPORT (OUTPATIENT)
Dept: REHABILITATION | Facility: HOSPITAL | Age: 70
End: 2020-06-09
Attending: FAMILY MEDICINE
Payer: MEDICARE

## 2020-06-09 DIAGNOSIS — R53.1 DECREASED STRENGTH, ENDURANCE, AND MOBILITY: ICD-10-CM

## 2020-06-09 DIAGNOSIS — M1A.09X0 IDIOPATHIC CHRONIC GOUT OF MULTIPLE SITES WITHOUT TOPHUS: ICD-10-CM

## 2020-06-09 DIAGNOSIS — R26.9 GAIT ABNORMALITY: ICD-10-CM

## 2020-06-09 DIAGNOSIS — R53.81 PHYSICAL DEBILITY: ICD-10-CM

## 2020-06-09 DIAGNOSIS — R26.89 BALANCE PROBLEM: ICD-10-CM

## 2020-06-09 DIAGNOSIS — R68.89 DECREASED STRENGTH, ENDURANCE, AND MOBILITY: ICD-10-CM

## 2020-06-09 DIAGNOSIS — Z74.09 DECREASED STRENGTH, ENDURANCE, AND MOBILITY: ICD-10-CM

## 2020-06-09 PROCEDURE — 97161 PT EVAL LOW COMPLEX 20 MIN: CPT | Mod: PN

## 2020-06-11 PROBLEM — R68.89 DECREASED STRENGTH, ENDURANCE, AND MOBILITY: Status: ACTIVE | Noted: 2020-06-11

## 2020-06-11 PROBLEM — R26.9 GAIT ABNORMALITY: Status: ACTIVE | Noted: 2020-06-11

## 2020-06-11 PROBLEM — R53.1 DECREASED STRENGTH, ENDURANCE, AND MOBILITY: Status: ACTIVE | Noted: 2020-06-11

## 2020-06-11 PROBLEM — Z74.09 DECREASED STRENGTH, ENDURANCE, AND MOBILITY: Status: ACTIVE | Noted: 2020-06-11

## 2020-06-11 PROBLEM — R26.89 BALANCE PROBLEM: Status: ACTIVE | Noted: 2020-06-11

## 2020-06-11 NOTE — PLAN OF CARE
[facility]  Physical Therapy Neurological Rehabilitation Initial Evaluation    Name: Bj Pate Jr.  Clinic Number: [id number]    Therapy Diagnosis:   Encounter Diagnoses   Name Primary?    Idiopathic chronic gout of multiple sites without tophus     Physical debility     Decreased strength, endurance, and mobility     Gait abnormality     Balance problem      Physician: Lombard, Azikiwe K., MD    Physician Orders: PT Eval and Treat     Medical Diagnosis from Referral:   M1A.09X0 (ICD-10-CM) - Idiopathic chronic gout of multiple sites without tophus   R53.81 (ICD-10-CM) - Physical debility     Evaluation Date: 6/9/2020  Authorization Period Expiration: 6/10/2020  Plan of Care Expiration: 9/9/2020  Visit # / Visits authorized: 1/ 1    Time In: 2:45 pm   Time Out: 3:30 pm  Total Billable Time: 45 minutes    Precautions: Standard and Diabetes    Subjective   Date of onset: ~4 months ago  History of current condition - Bj reports: states that he was in rehab at [facility]. States that he had episode of gout in all joints where he stayed in hospital 2 days. States that he was on steroids, made his blood pressure go down. States he was in rehab for about 2 months total. States that he was discharged. Pt states that he was sitting in w/c most of the day, states that he lost a lot of muscle mass. States that he initially couldn't walk at all. States he had HH PT for month, FWW when he left rehab.Pt states he has difficulty walking in community. Pt reports he has had 2 strokes, 2000 and 2015.      Medical History:   Past Medical History:   Diagnosis Date    Anticoagulant long-term use     Basal ganglia hemorrhage     Left basal ganglia hemorrhage with resultant right-sided hemiparesis which has resolved.    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        Surgical History:   Bj Pate Jr.  has a past surgical history that includes No past surgeries; Epidural steroid injection (N/A, 5/2/2019); Epidural steroid injection (Bilateral, 5/29/2019); Epidural steroid injection (Bilateral, 7/3/2019); Epidural steroid injection (N/A, 8/7/2019); and Carpal tunnel release (Left, 2/19/2020).    Medications:   Bj has a current medication list which includes the following prescription(s): advair diskus, albuterol, ascorbic acid (vitamin c), aspirin, atorvastatin, blood pressure test kit-large, blood sugar diagnostic, blood-glucose meter, colchicine, colcrys, diclofenac sodium, diltiazem, hydrochlorothiazide, insulin detemir u-100, lancets, lisinopril, oxycodone, polyethylene glycol, tamsulosin, vitamin d, and nystatin, and the following Facility-Administered Medications: sodium chloride 0.9%.    Allergies:   Review of patient's allergies indicates:   Allergen Reactions    Tomato (solanum lycopersicum) Hives    Naproxen Hives    Shrimp Other (See Comments)        Imaging, none relevant in chart    Prior Therapy: rehab,   Social History: 1 step and threshold, lives alone  Falls: 1, reaching for telephone, too far   DME: Shower chair and grab bars   Family Present at time of Eval: none   Occupation: retired  Prior Level of Function: walking without assistive device   Current Level of Function: difficulty reaching, standing, walking, limited endurance    Pain:  Current 0/10, worst 0/10, best 0/10   Reports of chronic back pain but states, "had it a while and no longer notice it daily    Pts goals: "I would like to regain my " "strength, use of my limbs"     Objective     - Follows commands: yes   - Speech: no deficits     Mental status: alert, oriented to person, place, and time  Appearance: Well groomed  Behavior:  calm, cooperative and adequate rapport can be established  Attention Span and Concentration:  Normal    Posture Alignment : forward head posture, increased thoracic kyphosis, forward lean onto RW    Visual/Auditory: denies changes     ROM:   UPPER EXTREMITY--AROM/PROM  (R) UE: Flexion - 90 degrees    Abduction - 90 degrees    IR - L5   ER - C1    (L) UE: Flexion - 90 degrees    Abduction - 90 degrees    IR - L5   ER - C1         RANGE OF MOTION--LOWER EXTREMITIES  (R) LE Hip: normal   Knee: normal   Ankle: normal    (L) LE: Hip: normal   Knee: normal   Ankle: normal    Strength: manual muscle test grades below   Upper Extremity Strength   RUE LUE   Shoulder Flexion: 3/5 3/5   Shoulder Abduction: 3/5 3/5   Shoulder External  Rotation:   3/5 3/5   Shoulder Internal  Rotation:   4/5 4/5   Elbow Flexion:      4/5 4/5     Lower Extremity Strength   RLE LLE   Hip Flexion: 4+/5 4+/5   Hip Extension:  NT NT   Hip Abduction: 4/5 4/5   Hip Adduction: 4+/5 4+/5   Knee Extension: 5/5 5/5   Knee Flexion: 4/5 4/5   Ankle Dorsiflexion: 5/5 5/5        Evaluation   Single Limb Stance R LE < 1 sec  (<10 sec = HIGH FALL RISK)   Single Limb Stance L LE < 1 sec  (<10 sec = HIGH FALL RISK)   5 times sit-stand 14 seconds (17 in)  >12 sec= fall risk for general elderly  >16 sec= fall risk for Parkinson's disease  >10 sec= balance/vestibular dysfunction (<61 y/o)  >14.2 sec= balance/vestibular dysfunction (>61 y/o)  >12 sec= fall risk for CVA     Postural control:  MCTSIB:  1. Eyes Open/feet together/Firm: 30 seconds  2. Eyes Closed/feet together/Firm: 30 seconds  3. Eyes Open/feet together/Foam: 30 seconds  4. Eyes Closed/feet together/Foam: 15 seconds    Gait Assessment:   - AD used: RW, 2 wheel  - Assistance: mod I  - Distance: 300 ft    GAIT " DEVIATIONS:   Decreased step length bilaterally  Forward trunk lean throughout gait  Reliance on UE support  Decreased gait speed  Decreased knee flexion throughout swing phase bilaterally    * Above impairments indicate decreased gait safety and increased fall risk.    Impairments contributing to deviations: impaired motor control, decreased endurance, decreased ROM, impaired strength of LEs    Endurance Deficit: yes      Evaluation   Timed Up and Go  19.8 sec (21 + 17 + 20)  < 20 sec safe for independent transfers,     < 30 sec assist required for transfers   Self-Selected Gait Speed 0.6 m/sec (6m/10s)   Fast Gait Speed 0.6 m/sec (6/10s)         TREATMENT   Treatment Time In: 00  Treatment Time Out: 00  Total Treatment time separate from Evaluation: 00 minutes    Home Exercises and Patient Education Provided    Education provided:   - role of PT, plan of care, goals, scheduling limitations, cancellation policies    Written Home Exercises Provided: HEP to be provided next session.    Assessment   Bj is a 70 y.o. male referred to outpatient Physical Therapy with a medical diagnosis of idiopathic chronic gout and physical debility. Pt presents with signs and symptoms consistent with decreased strength and tolerance to activities. Pt presenting with decreased strength and ROM of B shoulders. Pt also with decreased strength of B LE. Pt with significant impairments in functional mobility as demonstrated by decreased self-selected and fast gait speeds. Pt also with increased TUG score indicating increased risk for falls. Pt with limitations in balance, particularly when reliant on vestibular system. Pt would benefit from skilled PT services in order to address listed deficits, normalize gait, improve activity tolerance, maximize functional mobility, decrease fall risk, and improve safety. Pt is motivated to participate in therapy and is in agreement with POC.       Pt prognosis is Good.   Pt will benefit from skilled  outpatient Physical Therapy to address the deficits stated above and in the chart below, provide pt/family education, and to maximize pt's level of independence.     Plan of care discussed with patient: Yes  Pt's spiritual, cultural and educational needs considered and patient is agreeable to the plan of care and goals as stated below:     Anticipated Barriers for therapy: none    Medical Necessity is demonstrated by the following  History  Co-morbidities and personal factors that may impact the plan of care Co-morbidities:   COPD/asthma, diabetes, gout, HTN and HILARIO    Personal Factors:   no deficits     low   Examination  Body Structures and Functions, activity limitations and participation restrictions that may impact the plan of care Body Regions:   back  lower extremities  upper extremities    Body Systems:    ROM  strength  balance  gait  transfers  motor control    Participation Restrictions:   Ability to walk safely in house for prolonged distances    Activity limitations:   Learning and applying knowledge  no deficits    General Tasks and Commands  no deficits    Communication  no deficits    Mobility  lifting and carrying objects  walking    Self care  no deficits    Domestic Life  shopping  cooking  doing house work (cleaning house, washing dishes, laundry)    Interactions/Relationships  no deficits    Life Areas  no deficits    Community and Social Life  no deficits         low   Clinical Presentation stable and uncomplicated low   Decision Making/ Complexity Score: low     Goals:  Short Term Goals: 4 weeks   1. Pt to improve UE strength by > / = 1/2 MMT score in order to improve tolerance to daily activities.  2. Pt to improve B LE strength by > 1/2 MMT score in order to improve functional indepdence.   3. Pt to demonstrate improvement in gait speed to > / = m/s in order to demonstrate improved community ambulation.  4. Pt to improve condition 4 of MSTCIB to > / = 20 seconds in order to improve  safety.  5. Pt to ambulate x 100 ft without assistive device and with min A in order to demonstrate improved functional independence.    Long Term Goals: 8 weeks   1. Pt to improve UE strength by > / = 1 MMT score in order to improve tolerance to daily activities.  2. Pt to improve B LE strength by > 1 MMT score in order to improve functional indepdence.   3. Pt to demonstrate improvement in gait speed to > / = m/s in order to demonstrate improved community ambulation.  4. Pt to improve condition 4 of MSTCIB to 30 seconds in order to improve safety.  5. Pt to ambulate x 100 ft without assistive device independently in order to demonstrate improved functional independence.    Plan   Plan of care Certification: 6/9/2020 to 9/9/2020.    Outpatient Physical Therapy 2 times weekly for 8 weeks to include the following interventions: Gait Training, Manual Therapy, Moist Heat/ Ice, Neuromuscular Re-ed, Patient Education, Therapeutic Activites and Therapeutic Exercise.     Ligia Ibarra, PT, DPT  6/9/2020

## 2020-06-16 ENCOUNTER — CLINICAL SUPPORT (OUTPATIENT)
Dept: REHABILITATION | Facility: HOSPITAL | Age: 70
End: 2020-06-16
Attending: FAMILY MEDICINE
Payer: MEDICARE

## 2020-06-16 DIAGNOSIS — R68.89 DECREASED STRENGTH, ENDURANCE, AND MOBILITY: ICD-10-CM

## 2020-06-16 DIAGNOSIS — R26.89 BALANCE PROBLEM: ICD-10-CM

## 2020-06-16 DIAGNOSIS — Z74.09 DECREASED STRENGTH, ENDURANCE, AND MOBILITY: ICD-10-CM

## 2020-06-16 DIAGNOSIS — R53.1 DECREASED STRENGTH, ENDURANCE, AND MOBILITY: ICD-10-CM

## 2020-06-16 DIAGNOSIS — R26.9 GAIT ABNORMALITY: ICD-10-CM

## 2020-06-16 PROCEDURE — 97110 THERAPEUTIC EXERCISES: CPT | Mod: PN,CQ

## 2020-06-16 PROCEDURE — 97112 NEUROMUSCULAR REEDUCATION: CPT | Mod: PN,CQ

## 2020-06-23 ENCOUNTER — CLINICAL SUPPORT (OUTPATIENT)
Dept: REHABILITATION | Facility: HOSPITAL | Age: 70
End: 2020-06-23
Attending: FAMILY MEDICINE
Payer: MEDICARE

## 2020-06-23 DIAGNOSIS — R53.1 DECREASED STRENGTH, ENDURANCE, AND MOBILITY: ICD-10-CM

## 2020-06-23 DIAGNOSIS — Z74.09 DECREASED STRENGTH, ENDURANCE, AND MOBILITY: ICD-10-CM

## 2020-06-23 DIAGNOSIS — R26.89 BALANCE PROBLEM: ICD-10-CM

## 2020-06-23 DIAGNOSIS — R26.9 GAIT ABNORMALITY: ICD-10-CM

## 2020-06-23 DIAGNOSIS — R68.89 DECREASED STRENGTH, ENDURANCE, AND MOBILITY: ICD-10-CM

## 2020-06-23 PROCEDURE — 97110 THERAPEUTIC EXERCISES: CPT | Mod: PN,CQ

## 2020-06-23 PROCEDURE — 97112 NEUROMUSCULAR REEDUCATION: CPT | Mod: PN,CQ

## 2020-06-23 NOTE — PROGRESS NOTES
"  Physical Therapy Daily Treatment Note     Name: Bj Pate Jr.  Clinic Number: [id number]    Therapy Diagnosis:   Encounter Diagnoses   Name Primary?    Decreased strength, endurance, and mobility     Gait abnormality     Balance problem      Physician: Lombard, Azikiwe K., MD    Visit Date: 6/23/2020    Physician Orders: PT Eval and Treat      Medical Diagnosis from Referral:   M1A.09X0 (ICD-10-CM) - Idiopathic chronic gout of multiple sites without tophus   R53.81 (ICD-10-CM) - Physical debility      Evaluation Date: 6/9/2020  Authorization Period Expiration: 6/10/2020  Plan of Care Expiration: 9/9/2020  Visit # / Visits authorized: 2/ 12     Time In: 2:34 pm   Time Out: 3:14 pm  Total Billable Time: 40 minutes     Precautions: Standard and Diabetes      Subjective     Pt reports: just wanting to continue to get better.  Notes global weakness that needs significant improvement and wants to focus on BUE today.  He was compliant with home exercise program.  Response to previous treatment: ongoing  Functional change: ongoing    Pain: 4/10  Location: bilateral generalized      Pts goals: "I would like to regain my strength, use of my limbs"   Objective     Bj received therapeutic exercises to develop strength for 25 minutes including:    NuStep x 5' Level 2  Pulley Flex x 3'  Pulley PoS x 3'  Standing Rows GTB x 30  Standing Extension GTB x 30  No-Money (Seated) RTB x 30  Standing Palloff Press x 30  Sit<>Stand x 30 36"  Standing Marches  W/ Pull Downs x 30  Heel Raises x 30    Bj participated in neuromuscular re-education activities to improve: Balance for 13 minutes. The following activities were included:  Static Standing-Feet Together 2x1'  Semi-Tandem Standing 2x1'  Semi-Tandem Weight Shifting 2 x 30    Home Exercises Provided and Patient Education Provided     Education provided:   Cont to perform HEP as provided.     Written Home Exercises Provided: Patient instructed to cont prior HEP.  Exercises " were reviewed and Bj was able to demonstrate them prior to the end of the session.  Bj demonstrated fair  understanding of the education provided.     See EMR under Patient Instructions for exercises provided 06/09/2020.    Assessment     Pt presents to therapy with FWW with noted anterior lean and dependence on walker.  Incorporated balance work for improved stability in tall standing with carryover to gait and ambulation.  Challenged strength in BLE for improved endurance with community activities.  Developed standing tolerance while promoting BUE posterior GHJ strength for daily activities.  Pt tolerated well without increase in symptoms.      Bj is progressing well towards his goals.   Pt prognosis is Fair.     Pt will continue to benefit from skilled outpatient physical therapy to address the deficits listed in the problem list box on initial evaluation, provide pt/family education and to maximize pt's level of independence in the home and community environment.     Pt's spiritual, cultural and educational needs considered and pt agreeable to plan of care and goals.    Anticipated barriers to physical therapy: none    Goals:   Short Term Goals: 4 weeks   1. Pt to improve UE strength by > / = 1/2 MMT score in order to improve tolerance to daily activities.  2. Pt to improve B LE strength by > 1/2 MMT score in order to improve functional indepdence.   3. Pt to demonstrate improvement in gait speed to > / = m/s in order to demonstrate improved community ambulation.  4. Pt to improve condition 4 of MSTCIB to > / = 20 seconds in order to improve safety.  5. Pt to ambulate x 100 ft without assistive device and with min A in order to demonstrate improved functional independence.     Long Term Goals: 8 weeks   1. Pt to improve UE strength by > / = 1 MMT score in order to improve tolerance to daily activities.  2. Pt to improve B LE strength by > 1 MMT score in order to improve functional indepdence.   3. Pt  to demonstrate improvement in gait speed to > / = m/s in order to demonstrate improved community ambulation.  4. Pt to improve condition 4 of MSTCIB to 30 seconds in order to improve safety.  5. Pt to ambulate x 100 ft without assistive device independently in order to demonstrate improved functional independence.    Plan     Plan of care Certification: 6/9/2020 to 9/9/2020.     Outpatient Physical Therapy 2 times weekly for 8 weeks to include the following interventions: Gait Training, Manual Therapy, Moist Heat/ Ice, Neuromuscular Re-ed, Patient Education, Therapeutic Activites and Therapeutic Exercise.     Cont skilled PT session towards PT and patient's goals.    Mak Mohamud, PTA   06/23/2020

## 2020-06-25 ENCOUNTER — CLINICAL SUPPORT (OUTPATIENT)
Dept: REHABILITATION | Facility: HOSPITAL | Age: 70
End: 2020-06-25
Attending: FAMILY MEDICINE
Payer: MEDICARE

## 2020-06-25 DIAGNOSIS — R53.1 DECREASED STRENGTH, ENDURANCE, AND MOBILITY: ICD-10-CM

## 2020-06-25 DIAGNOSIS — R68.89 DECREASED STRENGTH, ENDURANCE, AND MOBILITY: ICD-10-CM

## 2020-06-25 DIAGNOSIS — Z74.09 DECREASED STRENGTH, ENDURANCE, AND MOBILITY: ICD-10-CM

## 2020-06-25 DIAGNOSIS — R26.9 GAIT ABNORMALITY: ICD-10-CM

## 2020-06-25 DIAGNOSIS — R26.89 BALANCE PROBLEM: ICD-10-CM

## 2020-06-25 PROCEDURE — 97110 THERAPEUTIC EXERCISES: CPT | Mod: PN

## 2020-06-25 NOTE — PROGRESS NOTES
"    Physical Therapy Daily Treatment Note     Name: Bj Pate Jr.  Clinic Number: [id number]    Therapy Diagnosis:   Encounter Diagnoses   Name Primary?    Decreased strength, endurance, and mobility     Gait abnormality     Balance problem      Physician: Lombard, Azikiwe K., MD    Visit Date: 6/25/2020    Physician Orders: PT Eval and Treat      Medical Diagnosis from Referral:   M1A.09X0 (ICD-10-CM) - Idiopathic chronic gout of multiple sites without tophus   R53.81 (ICD-10-CM) - Physical debility      Evaluation Date: 6/9/2020  Authorization Period Expiration: 6/10/2020  Plan of Care Expiration: 9/9/2020  Visit # / Visits authorized: 3/ 12    PN due: 7/9/2020     Time In: 4:15 pm   Time Out: 5:00 pm  Total Billable Time: 45 minutes     Precautions: Standard and Diabetes      Subjective     Pt reports: just wanting to continue to get better. He cont with pain 4/10.   He was compliant with home exercise program.  Response to previous treatment: ongoing  Functional change: ongoing    Pain: 4/10  Location: bilateral generalized      Pts goals: "I would like to regain my strength, use of my limbs"   Objective     Bj received therapeutic exercises to develop strength for 25 minutes including:    NuStep x 5' Level 2  Pulley Flex x 3'  Pulley PoS x 3'  No-Money (Seated) RTB x 30  Standing Palloff Press x 30  Sit<>Stand x 30 36"  Standing Marches  W/ Pull Downs x 30  Matrix rows 20# 3x10  Matrix triceps 30#   Matrix leg extension 10#  Matrix hamstring curls 30#   Matrix hip abd  Heel Raises x 30    NP:  Standing Rows GTB x 30  Standing Extension GTB x 30    Bj participated in neuromuscular re-education activities to improve: Balance for 13 minutes. The following activities were included:  Static Standing-Feet Together 2x1'  Semi-Tandem Standing 2x1'  Semi-Tandem Weight Shifting 2 x 30    Home Exercises Provided and Patient Education Provided     Education provided:   Cont to perform HEP as provided. "     Written Home Exercises Provided: Patient instructed to cont prior HEP.  Exercises were reviewed and Bj was able to demonstrate them prior to the end of the session.  Bj demonstrated fair  understanding of the education provided.     See EMR under Patient Instructions for exercises provided 06/09/2020.    Assessment     Pt presents to therapy with FWW with noted anterior lean and dependence on walker.  Incorporated balance work for improved stability in tall standing with carryover to gait and ambulation.  Progression of strengthening exercises on peripheral matrix machines today. He was able to tolerated well with increase of muscle challenge. He demonstrated increase of muscle fasciculation during exercises. He required several rest breaks during therapy.  Developed standing tolerance while promoting BUE posterior GHJ strength for daily activities.  Pt tolerated well without increase in symptoms.      Bj is progressing well towards his goals.   Pt prognosis is Fair.     Pt will continue to benefit from skilled outpatient physical therapy to address the deficits listed in the problem list box on initial evaluation, provide pt/family education and to maximize pt's level of independence in the home and community environment.     Pt's spiritual, cultural and educational needs considered and pt agreeable to plan of care and goals.    Anticipated barriers to physical therapy: none    Goals:   Short Term Goals: 4 weeks   1. Pt to improve UE strength by > / = 1/2 MMT score in order to improve tolerance to daily activities.  2. Pt to improve B LE strength by > 1/2 MMT score in order to improve functional indepdence.   3. Pt to demonstrate improvement in gait speed to > / = m/s in order to demonstrate improved community ambulation.  4. Pt to improve condition 4 of MSTCIB to > / = 20 seconds in order to improve safety.  5. Pt to ambulate x 100 ft without assistive device and with min A in order to demonstrate  improved functional independence.     Long Term Goals: 8 weeks   1. Pt to improve UE strength by > / = 1 MMT score in order to improve tolerance to daily activities.  2. Pt to improve B LE strength by > 1 MMT score in order to improve functional indepdence.   3. Pt to demonstrate improvement in gait speed to > / = m/s in order to demonstrate improved community ambulation.  4. Pt to improve condition 4 of MSTCIB to 30 seconds in order to improve safety.  5. Pt to ambulate x 100 ft without assistive device independently in order to demonstrate improved functional independence.    Plan     Plan of care Certification: 6/9/2020 to 9/9/2020.     Outpatient Physical Therapy 2 times weekly for 8 weeks to include the following interventions: Gait Training, Manual Therapy, Moist Heat/ Ice, Neuromuscular Re-ed, Patient Education, Therapeutic Activites and Therapeutic Exercise.     Cont skilled PT session towards PT and patient's goals.    Josue Rodriguez, PT   06/25/2020

## 2020-06-30 ENCOUNTER — CLINICAL SUPPORT (OUTPATIENT)
Dept: REHABILITATION | Facility: HOSPITAL | Age: 70
End: 2020-06-30
Attending: FAMILY MEDICINE
Payer: MEDICARE

## 2020-06-30 DIAGNOSIS — R26.89 BALANCE PROBLEM: ICD-10-CM

## 2020-06-30 DIAGNOSIS — Z74.09 DECREASED STRENGTH, ENDURANCE, AND MOBILITY: ICD-10-CM

## 2020-06-30 DIAGNOSIS — R53.1 DECREASED STRENGTH, ENDURANCE, AND MOBILITY: ICD-10-CM

## 2020-06-30 DIAGNOSIS — R68.89 DECREASED STRENGTH, ENDURANCE, AND MOBILITY: ICD-10-CM

## 2020-06-30 DIAGNOSIS — R26.9 GAIT ABNORMALITY: ICD-10-CM

## 2020-06-30 PROCEDURE — 97110 THERAPEUTIC EXERCISES: CPT | Mod: PN,CQ

## 2020-06-30 NOTE — PROGRESS NOTES
"    Physical Therapy Daily Treatment Note     Name: Bj Pate Jr.  Clinic Number: [id number]    Therapy Diagnosis:   Encounter Diagnoses   Name Primary?    Decreased strength, endurance, and mobility     Gait abnormality     Balance problem      Physician: Lombard, Azikiwe K., MD    Visit Date: 6/30/2020    Physician Orders: PT Eval and Treat      Medical Diagnosis from Referral:   M1A.09X0 (ICD-10-CM) - Idiopathic chronic gout of multiple sites without tophus   R53.81 (ICD-10-CM) - Physical debility      Evaluation Date: 6/9/2020  Authorization Period Expiration: 6/10/2020  Plan of Care Expiration: 9/9/2020  Visit # / Visits authorized: 4/ 12    PN due: 7/9/2020     Time In: 1433 pm   Time Out: 1514 pm  Total Billable Time: 40 minutes     Precautions: Standard and Diabetes      Subjective     Pt reports: just wanting to continue to get better after having a good session last visit. He cont with pain 4/10.   He was compliant with home exercise program.  Response to previous treatment: ongoing  Functional change: ongoing    Pain: 4/10  Location: bilateral generalized      Pts goals: "I would like to regain my strength, use of my limbs"   Objective     Bj received therapeutic exercises to develop strength for 40 minutes including:    NuStep x 5' Level 2  Pulley Flex x 3'  Pulley PoS x 3'  Standing Rows GTB x 30  Standing Extension GTB x 30  No-Money (Seated) RTB x 30  Standing Palloff Press x 30  Sit<>Stand x 30 36"  Standing Marches  W/ Pull Downs x 30  Matrix rows 20# 3x10  Matrix triceps 40# x30  Matrix biceps 40# x30  Matrix leg extension 40# x 30  Matrix hamstring curls 40# x 30  Matrix hip abd 40# x 30  Matrix hip add 40# x 30  Matrix chest press 40#x30    Heel Raises x 30      NP:      Bj participated in neuromuscular re-education activities to improve: Balance for 00 minutes. The following activities were included:  Static Standing-Feet Together 2x1'  Semi-Tandem Standing 2x1'  Semi-Tandem Weight " Shifting 2 x 30    Home Exercises Provided and Patient Education Provided     Education provided:   Cont to perform HEP as provided.     Written Home Exercises Provided: Patient instructed to cont prior HEP.  Exercises were reviewed and Bj was able to demonstrate them prior to the end of the session.  Bj demonstrated fair  understanding of the education provided.     See EMR under Patient Instructions for exercises provided 06/09/2020.    Assessment     Pt presents to therapy with FWW with noted anterior lean and dependence on walker.  Progression of strengthening exercises on peripheral matrix machines today. He was able to tolerated well with increase of muscle challenge. He demonstrated increase of muscle fasciculation during exercises. He required several rest breaks during therapy.  Developed standing tolerance while promoting BUE posterior GHJ strength for daily activities.  Pt tolerated well without increase in symptoms.      Bj is progressing well towards his goals.   Pt prognosis is Fair.     Pt will continue to benefit from skilled outpatient physical therapy to address the deficits listed in the problem list box on initial evaluation, provide pt/family education and to maximize pt's level of independence in the home and community environment.     Pt's spiritual, cultural and educational needs considered and pt agreeable to plan of care and goals.    Anticipated barriers to physical therapy: none    Goals:   Short Term Goals: 4 weeks   1. Pt to improve UE strength by > / = 1/2 MMT score in order to improve tolerance to daily activities.  2. Pt to improve B LE strength by > 1/2 MMT score in order to improve functional indepdence.   3. Pt to demonstrate improvement in gait speed to > / = m/s in order to demonstrate improved community ambulation.  4. Pt to improve condition 4 of MSTCIB to > / = 20 seconds in order to improve safety.  5. Pt to ambulate x 100 ft without assistive device and with min  A in order to demonstrate improved functional independence.     Long Term Goals: 8 weeks   1. Pt to improve UE strength by > / = 1 MMT score in order to improve tolerance to daily activities.  2. Pt to improve B LE strength by > 1 MMT score in order to improve functional indepdence.   3. Pt to demonstrate improvement in gait speed to > / = m/s in order to demonstrate improved community ambulation.  4. Pt to improve condition 4 of MSTCIB to 30 seconds in order to improve safety.  5. Pt to ambulate x 100 ft without assistive device independently in order to demonstrate improved functional independence.    Plan     Plan of care Certification: 6/9/2020 to 9/9/2020.     Outpatient Physical Therapy 2 times weekly for 8 weeks to include the following interventions: Gait Training, Manual Therapy, Moist Heat/ Ice, Neuromuscular Re-ed, Patient Education, Therapeutic Activites and Therapeutic Exercise.     Cont skilled PT session towards PT and patient's goals.    Mak Mohamud, PTA   06/30/2020

## 2020-07-02 ENCOUNTER — CLINICAL SUPPORT (OUTPATIENT)
Dept: REHABILITATION | Facility: HOSPITAL | Age: 70
End: 2020-07-02
Attending: FAMILY MEDICINE
Payer: MEDICARE

## 2020-07-02 DIAGNOSIS — R68.89 DECREASED STRENGTH, ENDURANCE, AND MOBILITY: ICD-10-CM

## 2020-07-02 DIAGNOSIS — R26.9 GAIT ABNORMALITY: ICD-10-CM

## 2020-07-02 DIAGNOSIS — R53.1 DECREASED STRENGTH, ENDURANCE, AND MOBILITY: ICD-10-CM

## 2020-07-02 DIAGNOSIS — R26.89 BALANCE PROBLEM: ICD-10-CM

## 2020-07-02 DIAGNOSIS — Z74.09 DECREASED STRENGTH, ENDURANCE, AND MOBILITY: ICD-10-CM

## 2020-07-02 PROCEDURE — 97110 THERAPEUTIC EXERCISES: CPT | Mod: PN,CQ

## 2020-07-02 PROCEDURE — 97112 NEUROMUSCULAR REEDUCATION: CPT | Mod: PN,CQ

## 2020-07-02 NOTE — PROGRESS NOTES
"    Physical Therapy Daily Treatment Note     Name: Bj Pate Jr.  Clinic Number: [id number]    Therapy Diagnosis:   No diagnosis found.  Physician: Lombard, Azikiwe K., MD    Visit Date: 7/2/2020    Physician Orders: PT Eval and Treat      Medical Diagnosis from Referral:   M1A.09X0 (ICD-10-CM) - Idiopathic chronic gout of multiple sites without tophus   R53.81 (ICD-10-CM) - Physical debility      Evaluation Date: 6/9/2020  Authorization Period Expiration: 6/10/2020  Plan of Care Expiration: 9/9/2020  Visit # / Visits authorized: 4/ 12    PN due: 7/9/2020     Time In: 1433 pm   Time Out: 1514 pm  Total Billable Time: 45 minutes     Precautions: Standard and Diabetes      Subjective     Pt reports: just wanting to continue to get better after having a good session last visit. He cont with pain 4/10.   He was compliant with home exercise program.  Response to previous treatment: ongoing  Functional change: ongoing    Pain: 4/10  Location: bilateral generalized      Pts goals: "I would like to regain my strength, use of my limbs"   Objective     Bj received therapeutic exercises to develop strength for 30 minutes including:    NuStep x 5' Level 2  +UBE 3'F/3'B  Pulley Flex x 3'  Pulley PoS x 3'  Standing Rows GTB x 30  Standing Extension GTB x 30  No-Money (Seated) RTB x 30  Standing Palloff Press x 30  Sit<>Stand x 30 36"  Standing Marches  W/ Pull Downs x 30  Matrix rows 20# 3x10  Matrix triceps 40# x30  Matrix biceps 40# x30  Matrix leg extension 40# x 30  Matrix hamstring curls 40# x 30  Matrix hip abd 40# x 30  Matrix hip add 40# x 30  Matrix chest press 40#x30    Heel Raises x 30  +Free Motion Rows 10# x 30  +Free Motion GHJ Extensions 10# x 30  +Free Motion Trunk Twists 10# x 30  +Free Motion Chest Press 3# x30  +Free Motion Chest Press with Marches 3#x30      NP:      Bj participated in neuromuscular re-education activities to improve: Balance for 15 minutes. The following activities were " included:  Static Standing-Feet Together 2x1'  Semi-Tandem Standing 2x1'  Semi-Tandem Weight Shifting 2 x 30    +Static Standing on AirEx Feet Together x3'  +Static Standing on AirEx Feet Apart x 3'  +Static Standing on BOSU Feet Together x 3'  +Static Standing on BOSU Feet Apart x 3'      Home Exercises Provided and Patient Education Provided     Education provided:   Cont to perform HEP as provided.     Written Home Exercises Provided: Patient instructed to cont prior HEP.  Exercises were reviewed and Bj was able to demonstrate them prior to the end of the session.  Bj demonstrated fair  understanding of the education provided.     See EMR under Patient Instructions for exercises provided 06/09/2020.    Assessment     Pt presents to therapy with FWW with noted anterior lean and dependence on walker.  Progression of strengthening exercises on peripheral Free Motion machines today to focus on standing endurance. He was able to tolerated well with increase of muscle challenge. He demonstrated increase of muscle fasciculation during exercises. He required several rest breaks during therapy.  Developed standing tolerance while promoting BUE posterior GHJ strength for daily activities.  Challenged proprioception with static standing on BOSU today with feet together and feet apart.  Pt tolerated well without increase in symptoms.      Bj is progressing well towards his goals.   Pt prognosis is Fair.     Pt will continue to benefit from skilled outpatient physical therapy to address the deficits listed in the problem list box on initial evaluation, provide pt/family education and to maximize pt's level of independence in the home and community environment.     Pt's spiritual, cultural and educational needs considered and pt agreeable to plan of care and goals.    Anticipated barriers to physical therapy: none    Goals:   Short Term Goals: 4 weeks   1. Pt to improve UE strength by > / = 1/2 MMT score in order  to improve tolerance to daily activities.  2. Pt to improve B LE strength by > 1/2 MMT score in order to improve functional indepdence.   3. Pt to demonstrate improvement in gait speed to > / = m/s in order to demonstrate improved community ambulation.  4. Pt to improve condition 4 of MSTCIB to > / = 20 seconds in order to improve safety.  5. Pt to ambulate x 100 ft without assistive device and with min A in order to demonstrate improved functional independence.     Long Term Goals: 8 weeks   1. Pt to improve UE strength by > / = 1 MMT score in order to improve tolerance to daily activities.  2. Pt to improve B LE strength by > 1 MMT score in order to improve functional indepdence.   3. Pt to demonstrate improvement in gait speed to > / = m/s in order to demonstrate improved community ambulation.  4. Pt to improve condition 4 of MSTCIB to 30 seconds in order to improve safety.  5. Pt to ambulate x 100 ft without assistive device independently in order to demonstrate improved functional independence.    Plan     Plan of care Certification: 6/9/2020 to 9/9/2020.     Outpatient Physical Therapy 2 times weekly for 8 weeks to include the following interventions: Gait Training, Manual Therapy, Moist Heat/ Ice, Neuromuscular Re-ed, Patient Education, Therapeutic Activites and Therapeutic Exercise.     Cont skilled PT session towards PT and patient's goals.    Mak Mohamud, MARIZOL   07/02/2020

## 2020-07-06 ENCOUNTER — TELEPHONE (OUTPATIENT)
Dept: FAMILY MEDICINE | Facility: CLINIC | Age: 70
End: 2020-07-06

## 2020-07-06 ENCOUNTER — EXTERNAL HOME HEALTH (OUTPATIENT)
Dept: HOME HEALTH SERVICES | Facility: HOSPITAL | Age: 70
End: 2020-07-06
Payer: MEDICARE

## 2020-07-06 NOTE — TELEPHONE ENCOUNTER
----- Message from Linda Campbell sent at 7/6/2020  8:21 AM CDT -----  Regarding: request for occupational therapy  Type: Patient Call Back    Who called:Bj     What is the request in detail: The patient is requesting a new order for occupational therapy     Can the clinic reply by [facility]?no    Would the patient rather a call back or a response via [facility]? Call back     Best call back number:[phone]

## 2020-07-07 ENCOUNTER — TELEPHONE (OUTPATIENT)
Dept: FAMILY MEDICINE | Facility: CLINIC | Age: 70
End: 2020-07-07

## 2020-07-07 DIAGNOSIS — R53.81 PHYSICAL DEBILITY: Primary | ICD-10-CM

## 2020-07-07 DIAGNOSIS — Z86.73 HISTORY OF CVA (CEREBROVASCULAR ACCIDENT): ICD-10-CM

## 2020-07-07 NOTE — TELEPHONE ENCOUNTER
Pt states that referral that he received in may for occupational therapy has . Pt requesting another referral.

## 2020-07-17 ENCOUNTER — HOSPITAL ENCOUNTER (OUTPATIENT)
Dept: RADIOLOGY | Facility: HOSPITAL | Age: 70
Discharge: HOME | End: 2020-07-17
Attending: STUDENT IN AN ORGANIZED HEALTH CARE EDUCATION/TRAINING PROGRAM
Payer: MEDICARE

## 2020-07-17 DIAGNOSIS — N18.2 CKD (CHRONIC KIDNEY DISEASE) STAGE 2, GFR 60-89 ML/MIN: ICD-10-CM

## 2020-07-17 DIAGNOSIS — E66.9 OBESITY, UNSPECIFIED CLASSIFICATION, UNSPECIFIED OBESITY TYPE, UNSPECIFIED WHETHER SERIOUS COMORBIDITY PRESENT: ICD-10-CM

## 2020-07-17 DIAGNOSIS — I10 HYPERTENSION, UNSPECIFIED TYPE: ICD-10-CM

## 2020-07-17 DIAGNOSIS — M10.9 GOUT, UNSPECIFIED CAUSE, UNSPECIFIED CHRONICITY, UNSPECIFIED SITE: ICD-10-CM

## 2020-07-20 ENCOUNTER — HOSPITAL ENCOUNTER (OUTPATIENT)
Dept: RADIOLOGY | Facility: HOSPITAL | Age: 70
Discharge: HOME | End: 2020-07-20
Attending: STUDENT IN AN ORGANIZED HEALTH CARE EDUCATION/TRAINING PROGRAM
Payer: MEDICARE

## 2020-07-20 PROCEDURE — 77077 XR ARTHRITIS SURVEY: ICD-10-PCS | Mod: 26,,, | Performed by: RADIOLOGY

## 2020-07-20 PROCEDURE — 77077 JOINT SURVEY SINGLE VIEW: CPT | Mod: TC,PO

## 2020-07-20 PROCEDURE — 77077 JOINT SURVEY SINGLE VIEW: CPT | Mod: 26,,, | Performed by: RADIOLOGY

## 2020-07-22 ENCOUNTER — TELEPHONE (OUTPATIENT)
Dept: FAMILY MEDICINE | Facility: CLINIC | Age: 70
End: 2020-07-22

## 2020-07-22 ENCOUNTER — CLINICAL SUPPORT (OUTPATIENT)
Dept: REHABILITATION | Facility: HOSPITAL | Age: 70
End: 2020-07-22
Attending: FAMILY MEDICINE
Payer: MEDICARE

## 2020-07-22 DIAGNOSIS — Z86.79 HISTORY OF CEREBRAL PARENCHYMAL HEMORRHAGE: Primary | ICD-10-CM

## 2020-07-22 DIAGNOSIS — R53.1 DECREASED STRENGTH, ENDURANCE, AND MOBILITY: ICD-10-CM

## 2020-07-22 DIAGNOSIS — R26.9 GAIT ABNORMALITY: ICD-10-CM

## 2020-07-22 DIAGNOSIS — Z74.09 DECREASED STRENGTH, ENDURANCE, AND MOBILITY: ICD-10-CM

## 2020-07-22 DIAGNOSIS — R26.89 BALANCE PROBLEM: ICD-10-CM

## 2020-07-22 DIAGNOSIS — R68.89 DECREASED STRENGTH, ENDURANCE, AND MOBILITY: ICD-10-CM

## 2020-07-22 PROCEDURE — 97110 THERAPEUTIC EXERCISES: CPT | Mod: PN

## 2020-07-22 NOTE — TELEPHONE ENCOUNTER
----- Message from Felecia Engel sent at 7/21/2020  2:11 PM CDT -----  Name of Who is Calling: JEAN TRINIDAD JR. [[id number]]      What is the request in detail: Pt is calling to speak to staff in regards to the status of a 4 wheel walker being ordered .... Please call to further assist .       Can the clinic reply by [facility]: N       What Number to [facility] Back [facility]: [phone]

## 2020-07-22 NOTE — PROGRESS NOTES
"    Physical Therapy Daily Treatment Note     Name: Bj Pate Jr.  Clinic Number: [id number]    Therapy Diagnosis:   Encounter Diagnoses   Name Primary?    Decreased strength, endurance, and mobility     Gait abnormality     Balance problem      Physician: Lombard, Azikiwe K., MD    Visit Date: 7/22/2020    Physician Orders: PT Eval and Treat      Medical Diagnosis from Referral:   M1A.09X0 (ICD-10-CM) - Idiopathic chronic gout of multiple sites without tophus   R53.81 (ICD-10-CM) - Physical debility      Evaluation Date: 6/9/2020  Authorization Period Expiration: 6/10/2020  Plan of Care Expiration: 9/9/2020  Visit # / Visits authorized: 6/ 12    PN due: 8/22/2020     Time In: 6:00 pm   Time Out: 6:45 pm  Total Billable Time: 45 minutes     Precautions: Standard and Diabetes      Subjective     Pt reports: that he has not been in therapy for about one week and half due to scheduling. Pt states he has overall pain about 5/10. Pt states he wants to get his arms and shoulders strong.  Pt c/o that he cannot raise his arm high.   He was compliant with home exercise program.  Response to previous treatment: ongoing  Functional change: ongoing    Pain: 5/10  Location: bilateral generalized      Pts goals: "I would like to regain my strength, use of my limbs"   Objective   CMS Impairment/Limitation/Restriction for FOTO NOC-musculo-skeletal disorder Survey  Status Limitation G-Code CMS Severity Modifier  Intake 41% 59%  Predicted 55% 45% Goal Status+ CK - At least 40 percent but less than 60 percent  7/22/2020 32% 68% Current Status CL - At least 60 percent but less than 80 percent  D/C Status CL **only report if this is discharge survey    Upper Extremity Strength    RUE LUE   Shoulder Flexion: 3/5 3/5   Shoulder Abduction: 3/5 3/5   Shoulder External  Rotation:    4/5 3/5   Shoulder Internal  Rotation:    4/5 4/5   Elbow Flexion:       4/5 4/5      Lower Extremity Strength    RLE LLE   Hip Flexion: 4+/5 4+/5   Hip " "Extension:  NT NT   Hip Abduction: 4/5 4/5   Hip Adduction: 4+/5 4+/5   Knee Extension: 5/5 5/5   Knee Flexion: 4+/5 4+/5   Ankle Dorsiflexion: 5/5 5/5      Observation: unsteadily gait.     Bj received therapeutic exercises to develop strength for 30 minutes including:    NuStep x 5' Level 2  +UBE 3'F/3'B  Pulleys flexion 3 min   Freemotion shoulder extension 7# 3x10  SBAx1  Fremotion scapular retraction 7# 3x10  SBAx1  Standing wall walk 2x10  SBAx1   Matrix rows 40# 3x10  Matrix leg extension 40# 2x 10 NP due to time  Matrix hamstring curls 40# 2x 10  NP due to time  Matrix hip abd 40# 2x 10  NP due to time  Matrix hip add 40# 2x 10  NP due to time  Matrix chest press 40# 2x10  NP due to time      NP:  Pulley Flex x 3'  Pulley PoS x 3'  Standing Rows GTB x 30  Standing Extension GTB x 30  No-Money (Seated) RTB x 30  Standing Palloff Press x 30  Sit<>Stand x 30 36"  Standing Marches  W/ Pull Downs x 30    Bj participated in neuromuscular re-education activities to improve: Balance for 15 minutes. The following activities were included:  Static Standing-Feet Together 2x1'  Semi-Tandem Standing 2x1'  Semi-Tandem Weight Shifting 2 x 30    +Static Standing on AirEx Feet Together x3'  +Static Standing on AirEx Feet Apart x 3'  +Static Standing on BOSU Feet Together x 3'  +Static Standing on BOSU Feet Apart x 3'      Home Exercises Provided and Patient Education Provided     Education provided:   Cont to perform HEP as provided.     Written Home Exercises Provided: Patient instructed to cont prior HEP.  Exercises were reviewed and Bj was able to demonstrate them prior to the end of the session.  Bj demonstrated fair  understanding of the education provided.     See EMR under Patient Instructions for exercises provided 06/09/2020.    Assessment     Pt presents to therapy with FWW with noted anterior lean and dependence on walker. Pt has not been in therapy for one week and half since there were no appts " available in therapy. Pt returned to therapy stating he has shoulders weakness and LE weakness. Pt is motivated to improve shoulder range of motion and strength, and B LE muscle strength.  Pt was re-assessed today. Pt demonstrated decrease B shoulder muscle strength in flexion, Abd, IR, and ER. Pt is unable to elevate arm above 90 deg due to rotator cuff weakness. He demonstrated an overall 4/5 during MMT to B LE. FOTO survey demonstrated decrease of score since initial eval. Pt cont ambulating with FWW. Pt cont demonstrating poor standing balance. Pt has not met any STGs.Overall, pt has been demonstrating improvement. Plan to work on B shoulder AROM and muscles strength. Also, working on B LE muscles strength and balance to improve quality og life and decrease fall risk.      Bj is progressing well towards his goals.   Pt prognosis is Fair.     Pt will continue to benefit from skilled outpatient physical therapy to address the deficits listed in the problem list box on initial evaluation, provide pt/family education and to maximize pt's level of independence in the home and community environment.     Pt's spiritual, cultural and educational needs considered and pt agreeable to plan of care and goals.    Anticipated barriers to physical therapy: none    Goals:   Short Term Goals: 4 weeks   1. Pt to improve UE strength by > / = 1/2 MMT score in order to improve tolerance to daily activities. Goal In progress  7-  2. Pt to improve B LE strength by > 1/2 MMT score in order to improve functional indepdence. Goal in progress  7-  3. Pt to demonstrate improvement in gait speed to > / = m/s in order to demonstrate improved community ambulation Goal in progress 7-.  4. Pt to improve condition 4 of MSTCIB to > / = 20 seconds in order to improve safety.goal in progress 7-  5. Pt to ambulate x 100 ft without assistive device and with min A in order to demonstrate improved functional  independence. Goal in progress  7-     Long Term Goals: 8 weeks   1. Pt to improve UE strength by > / = 1 MMT score in order to improve tolerance to daily activities.  2. Pt to improve B LE strength by > 1 MMT score in order to improve functional indepdence.   3. Pt to demonstrate improvement in gait speed to > / = m/s in order to demonstrate improved community ambulation.  4. Pt to improve condition 4 of MSTCIB to 30 seconds in order to improve safety.  5. Pt to ambulate x 100 ft without assistive device independently in order to demonstrate improved functional independence.    Plan     Plan of care Certification: 6/9/2020 to 9/9/2020.     Outpatient Physical Therapy 2 times weekly for 8 weeks to include the following interventions: Gait Training, Manual Therapy, Moist Heat/ Ice, Neuromuscular Re-ed, Patient Education, Therapeutic Activites and Therapeutic Exercise.     Cont skilled PT session towards PT and patient's goals.    Josue Rodriguez, PT   07/22/2020

## 2020-07-24 ENCOUNTER — CLINICAL SUPPORT (OUTPATIENT)
Dept: REHABILITATION | Facility: HOSPITAL | Age: 70
End: 2020-07-24
Attending: FAMILY MEDICINE
Payer: MEDICARE

## 2020-07-24 DIAGNOSIS — R29.898 DECREASED GRIP STRENGTH: ICD-10-CM

## 2020-07-24 DIAGNOSIS — R26.9 GAIT ABNORMALITY: ICD-10-CM

## 2020-07-24 DIAGNOSIS — Z78.9 ALTERATION IN INSTRUMENTAL ACTIVITIES OF DAILY LIVING (IADL): ICD-10-CM

## 2020-07-24 DIAGNOSIS — M25.612 DECREASED ROM OF LEFT SHOULDER: ICD-10-CM

## 2020-07-24 DIAGNOSIS — R26.89 BALANCE PROBLEM: ICD-10-CM

## 2020-07-24 DIAGNOSIS — R53.1 DECREASED STRENGTH, ENDURANCE, AND MOBILITY: ICD-10-CM

## 2020-07-24 DIAGNOSIS — M25.611 DECREASED ROM OF RIGHT SHOULDER: ICD-10-CM

## 2020-07-24 DIAGNOSIS — R68.89 DECREASED STRENGTH, ENDURANCE, AND MOBILITY: ICD-10-CM

## 2020-07-24 DIAGNOSIS — Z86.73 HISTORY OF CVA (CEREBROVASCULAR ACCIDENT): ICD-10-CM

## 2020-07-24 DIAGNOSIS — Z74.09 DECREASED STRENGTH, ENDURANCE, AND MOBILITY: ICD-10-CM

## 2020-07-24 PROCEDURE — 97165 OT EVAL LOW COMPLEX 30 MIN: CPT | Mod: PN

## 2020-07-24 PROCEDURE — 97110 THERAPEUTIC EXERCISES: CPT | Mod: PN,CQ

## 2020-07-24 NOTE — PROGRESS NOTES
"    Physical Therapy Daily Treatment Note     Name: Bj Pate Jr.  Clinic Number: [id number]    Therapy Diagnosis:   Encounter Diagnoses   Name Primary?    Decreased strength, endurance, and mobility     Gait abnormality     Balance problem      Physician: Lombard, Azikiwe K., MD    Visit Date: 7/24/2020    Physician Orders: PT Eval and Treat      Medical Diagnosis from Referral:   M1A.09X0 (ICD-10-CM) - Idiopathic chronic gout of multiple sites without tophus   R53.81 (ICD-10-CM) - Physical debility      Evaluation Date: 6/9/2020  Authorization Period Expiration: 6/10/2020  Plan of Care Expiration: 9/9/2020  Visit # / Visits authorized: 8/ 12    PN due: 8/22/2020     Time In: 1215 pm   Time Out: 0110 pm  Total Billable Time: 40 minutes     Precautions: Standard and Diabetes      Subjective     Pt reports: that he has not been in therapy for about one week and half due to scheduling. Pt states he has overall pain about 5/10. Pt states he wants to get his arms and shoulders strong.  Pt c/o that he cannot raise his arm high.   He was compliant with home exercise program.  Response to previous treatment: ongoing  Functional change: ongoing    Pain: 5/10  Location: bilateral generalized      Pts goals: "I would like to regain my strength, use of my limbs"   Objective     Bj received therapeutic exercises to develop strength for 30 minutes including:    NuStep x 5' Level 2  +UBE 3'F/3'B  Pulleys flexion 3 min   +Freemotion AAROM 3# PoS x 3'  Freemotion shoulder extension 7# 3x10  SBAx1  Fremotion scapular retraction 7# 3x10  SBAx1  Sit<>Stand x 30 18" + Ball Lifts  Standing Marches  W/ Pull Downs x 30  Standing wall walk 2x10  SBAx1   Matrix rows 40# 3x10  Matrix leg extension 40# 2x 10 NP due to time  Matrix hamstring curls 40# 2x 10  NP due to time  Matrix hip abd 40# 2x 10  NP due to time  Matrix hip add 40# 2x 10  NP due to time  Matrix chest press 40# 2x10  NP due to time      NP:  Pulley Flex x 3'  Pulley " PoS x 3'  Standing Rows GTB x 30  Standing Extension GTB x 30  No-Money (Seated) RTB x 30  Standing Palloff Press x 30      Bj participated in neuromuscular re-education activities to improve: Balance for 00 minutes. The following activities were included:  Static Standing-Feet Together 2x1'  Semi-Tandem Standing 2x1'  Semi-Tandem Weight Shifting 2 x 30    +Static Standing on AirEx Feet Together x3'  +Static Standing on AirEx Feet Apart x 3'  +Static Standing on BOSU Feet Together x 3'  +Static Standing on BOSU Feet Apart x 3'      Home Exercises Provided and Patient Education Provided     Education provided:   Cont to perform HEP as provided.     Written Home Exercises Provided: Patient instructed to cont prior HEP.  Exercises were reviewed and Bj was able to demonstrate them prior to the end of the session.  Bj demonstrated fair  understanding of the education provided.     See EMR under Patient Instructions for exercises provided 06/09/2020.    Assessment     Pt presents to therapy with FWW with noted anterior lean and dependence on walker. Incorporated standing tolerance while utilizing BUE mobility.  Pt demonstrated significant fatigue however longer endurance to standing.  Challenged posterior GHJ for improved postural endurance and elevation with daily tasks.  Pt tolerated well without increase in symptoms.       Bj is progressing well towards his goals.   Pt prognosis is Fair.     Pt will continue to benefit from skilled outpatient physical therapy to address the deficits listed in the problem list box on initial evaluation, provide pt/family education and to maximize pt's level of independence in the home and community environment.     Pt's spiritual, cultural and educational needs considered and pt agreeable to plan of care and goals.    Anticipated barriers to physical therapy: none    Goals:   Short Term Goals: 4 weeks   1. Pt to improve UE strength by > / = 1/2 MMT score in order to  improve tolerance to daily activities. Goal In progress  7-  2. Pt to improve B LE strength by > 1/2 MMT score in order to improve functional indepdence. Goal in progress  7-  3. Pt to demonstrate improvement in gait speed to > / = m/s in order to demonstrate improved community ambulation Goal in progress 7-.  4. Pt to improve condition 4 of MSTCIB to > / = 20 seconds in order to improve safety.goal in progress 7-  5. Pt to ambulate x 100 ft without assistive device and with min A in order to demonstrate improved functional independence. Goal in progress  7-     Long Term Goals: 8 weeks   1. Pt to improve UE strength by > / = 1 MMT score in order to improve tolerance to daily activities.  2. Pt to improve B LE strength by > 1 MMT score in order to improve functional indepdence.   3. Pt to demonstrate improvement in gait speed to > / = m/s in order to demonstrate improved community ambulation.  4. Pt to improve condition 4 of MSTCIB to 30 seconds in order to improve safety.  5. Pt to ambulate x 100 ft without assistive device independently in order to demonstrate improved functional independence.    Plan     Plan of care Certification: 6/9/2020 to 9/9/2020.     Outpatient Physical Therapy 2 times weekly for 8 weeks to include the following interventions: Gait Training, Manual Therapy, Moist Heat/ Ice, Neuromuscular Re-ed, Patient Education, Therapeutic Activites and Therapeutic Exercise.     Cont skilled PT session towards PT and patient's goals.    Mak Mohamud, PTA   07/24/2020

## 2020-07-27 PROBLEM — Z78.9 ALTERATION IN INSTRUMENTAL ACTIVITIES OF DAILY LIVING (IADL): Status: ACTIVE | Noted: 2020-07-27

## 2020-07-27 PROBLEM — R29.898 DECREASED GRIP STRENGTH: Status: ACTIVE | Noted: 2020-07-27

## 2020-07-27 PROBLEM — M25.611 DECREASED ROM OF RIGHT SHOULDER: Status: ACTIVE | Noted: 2020-07-27

## 2020-07-27 PROBLEM — M25.612 DECREASED ROM OF LEFT SHOULDER: Status: ACTIVE | Noted: 2020-07-27

## 2020-07-27 NOTE — PLAN OF CARE
[facility]  Initial Neurological Evaluation     Date: 7/24/2020  Patient: Bj Pate Jr.  Chart Number: [id number]    Therapy Diagnosis:   Encounter Diagnoses   Name Primary?    History of CVA (cerebrovascular accident)     Decreased ROM of left shoulder     Decreased ROM of right shoulder     Alteration in instrumental activities of daily living (IADL)     Decreased  strength      Physician: Lombard, Azikiwe K., MD    Physician Orders: OT Eval and treat  Medical Diagnosis: CVA  Evaluation Date: 7/24/2020  Plan of Care Expiration Period: 10/2/20  Insurance Authorization period Expiration: TBD  Date of Return to MD: TBRHONA  Visit # / Visits Authorized: 1 / 1  FOTO: 5th visit    Time In:1:45pm  Time Out: 2:30pm  Total Billable (one on one) Time: 45 minutes    Precautions: Standard and Fall    Subjective     History of Current Condition:  Stoke hx. Most recent stroke was ~5 years ago. Was hospitalized with gouty arthritis and was transferred to inpatient facility to address. Feels his hands are doing ok just weak. Current c/o of R hand weakness secondary to gouty arthritis.     Involved Side: Feels weakness is equal bilaterally  Dominant Side: Right  Date of Onset: Feb 25th  Surgical Procedure: Carpal tunnel sx prior to hospitalization   Imaging: See image report  Previous Therapy: acute and inpatient    Patient's Goals for Therapy: improve strength and hand function    Pain:  Pain Related Behaviors Observed: no   Functional Pain Scale Rating 0-10:   5/10 on average  4/10 at best  7/10 at worst  Location: all over, back, and joints  Description: Aching  Aggravating Factors: cold weather  Easing Factors: heating pad    Occupation:    Working presently: retired  Duties: long driving    Functional Limitations/Social History:    Prior Level of Function: I  Current Level of Function:see below    Home/Living environment : lives alone  Home Access: [facility], 2  steps to enter   DME: standard walker, bedside commode and shower chair     Leisure: relaxing    Driving: yes    Past Medical History/Physical Systems Review:     Past Medical History:  Bj Pate Jr.  has a past medical history of Anticoagulant long-term use, Basal ganglia hemorrhage, Benign hypertension with CKD (chronic kidney disease) stage III, Cataract, Chronic idiopathic gout of multiple sites, Chronic kidney disease, stage 3, COPD (chronic obstructive pulmonary disease), Erectile dysfunction, Gout, Hemorrhoids without complication, Hyperlipidemia, Morbid obesity, Obstructive sleep apnea on CPAP, Stroke, Thalamic infarct, acute (right), and 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    Past Surgical History:  Bj Pate Jr.  has a past surgical history that includes No past surgeries; Epidural steroid injection (N/A, 5/2/2019); Epidural steroid injection (Bilateral, 5/29/2019); Epidural steroid injection (Bilateral, 7/3/2019); Epidural steroid injection (N/A, 8/7/2019); and Carpal tunnel release (Left, 2/19/2020).    Current Medications:  Bj has a current medication list which includes the following prescription(s): advair diskus, albuterol, ascorbic acid (vitamin c), aspirin, atorvastatin, blood pressure test kit-large, blood sugar diagnostic, blood-glucose meter, colchicine, colcrys, diclofenac sodium, diltiazem, hydrochlorothiazide, insulin detemir u-100, lancets, lisinopril, oxycodone, polyethylene glycol, tamsulosin, vitamin d, and nystatin, and the following Facility-Administered Medications: sodium chloride 0.9%.    Allergies:  Review of patient's allergies indicates:   Allergen Reactions    Tomato (solanum lycopersicum) Hives    Naproxen Hives    Shrimp Other (See Comments)        Other: see chart    Objective     Cognitive Exam:  Oriented: Person, Place, Time and Situation  Behaviors: normal, cooperative  Follows Commands/attention: Follows multistep  commands  Communication:  clear/fluent  Memory: No Deficits noted as determined by 3 word recall after 1 minute and 3 minutes  Safety awareness/insight to disability: aware of diagnosis, treatment, and prognosis  Coping skills/emotional control: Appropriate to situation    Visual/Perceptual:    Acuity: corrected by glasses  Convergence: NT  Nystagmus: NT   R/L discrimination: intact  Visual field: intact  Motor Planning Praxis: intact  Comments: n/a    Physical Exam:  Postural examination/scapula alignment: Rounded shoulder and Head forward  Joint integrity: Firm end feeling  Skin integrity: warm/dry  Edema: none noted   Palpation: NT      Joint Evaluation  AROM  7/24/2020 PROM   7/24/2020 AROM  7/24/2020 PROM   7/24/2020    Left Left Right Right   Shoulder flex 0-180 85 175 110 175   Shoulder Abd 0-180 70 160 130 WNL   Shoulder ER 0-90 20 75 50 WNL   Shoulder IR 0-90 Small of back WNL Small of back WNL   Shoulder Extension 0-80 60 70 50 80   Shoulder Horizontal adduction 0-90 WFL WNL WFL WNL   Elbow flex/ext 0-150 5/WNL 5/WNL 10/WNL 5/WNL   Wrist flex 0-80 45 60 40 50   Wrist ext 0-70 50 65 60 75   Supination 0-80 WFL WNL 50 WNL   Pronation 0-80 WNL WNL WNL wNL     Fist: normal      Strength 7/24/2020 7/24/2020   **within available ROM** Left Right   Shoulder flex 3/5 3/5   Shoulder abd 3/5 3/5   Shoulder ER 4/5 4/5   Shoulder IR 5/5 5/5   Shoulder Extension 5/5 5/5   Shoulder Horizontal adduction 4/5 4/5   Elbow flex 5/5 5/5   Elbow ext 5/5 5/5   Wrist flex 5/5 5/5   Wrist ext 5/5 5/5   Supination 5/5 5/5   Pronation 5/5 5/5      Strength: (YVETTE Dynamometer in lbs.) Average 3 trials, Position II:     7/24/2020 7/24/2020    Left Right   Rung II 40.7# 30.5#       Gross motor coordination:   - GURPREET (Rapid Alternating Movements): WFL  - Finger to Nose (5 times): WFL  - Finger Flicks (coordination moving from digit flexion to digit extension): WFL    Tone:  Modified Anselmo Scale:   0 - No increase in muscle tone    Comments: Noted  shoulder weakness    Sensation:  Bj  reports normal sensation      Functional Status      Functional Mobility:  Bed mobility: I  Roll to left: I  Roll to right: I  Supine to sit: I  Sit to supine: I  Transfers to bed: I  Transfers to toilet: I  Car transfers: I  Wheelchair mobility: n/a    ADL's:  Feeding: Mod I  Grooming: Mod I  Hygiene: Mod I  UB Dressing: Mod I  LB Dressing: Mod I  Toileting: Mod I  Bathing: Mod I    IADL's:  Homecare: Mod I  Cooking: Mod I  Laundry: I  Yard work: n/a  Use of telephone: I  Money management: I  Medication management: I  Handwriting:I  Technology Use:I    Comments:      CMS Impairment/Limitation/Restriction for FOTO Neuro Survey    Therapist reviewed FOTO scores for Bj Pate Jr. on 7/24/2020.   FOTO documents entered into [employer] - see Media section.    Limitation Score: 60%  Category: Mobility           Treatment     Home Exercises and Patient Education Provided    Education provided:   -role of OT, goals for OT, scheduling/cancellations, insurance limitations with patient.  -Additional Education provided: n/a      Assessment     Bj Pate Jr. is a 70 y.o. male referred to outpatient occupational therapy and presents with a medical diagnosis of hx of cva and gouty arthritis, resulting in pain, decreased flexibility, decreased range of motion, decreased muscle strength and impaired function and demonstrates limitations as described in the chart below. Following medical record review it is determined that patient will benefit from occupational therapy services in order to maximize pain free and/or functional use of bilateral UEs.    Patient prognosis is Good due to  motivation  Patient will benefit from skilled outpatient Occupational Therapy to address the deficits stated above and in the chart below, provide patient/family education, and to maximize patient's level of independence.     Plan of care discussed with patient: Yes  Patient's spiritual, cultural and  educational needs considered and patient is agreeable to the plan of care and goals as stated below:     Anticipated Barriers for therapy: none    Medical Necessity is demonstrated by the following  Profile and History Assessment of Occupational Performance Level of Clinical Decision Making Complexity Score   Occupational Profile:   Bj Pate Jr. is a 70 y.o. male who lives alone and is currently retired. Bj Pate Jr. has difficulty with  bathing and dressing  housework/household chores  affecting his/her daily functional abilities. His/her main goal for therapy is improve strength and ROM.     Comorbidities:   see chart    Medical and Therapy History Review:   Brief               Performance Deficits    Physical:  Joint Mobility  Muscle Power/Strength   Strength  Pain    Cognitive:  No Deficits    Psychosocial:    No Deficits     Clinical Decision Making:  low    Assessment Process:  Problem-Focused Assessments    Modification/Need for Assistance:  Not Necessary    Intervention Selection:  Several Treatment Options       low  Based on PMHX, co morbidities , data from assessments and functional level of assistance required with task and clinical presentation directly impacting function.       The following goals were discussed with the patient and patient is in agreement with them as to be addressed in the treatment plan.     Goals:  Short Term Goals: 5 weeks   - Pt. Will be educated in HEP  - Pt. Will have improved b/l shoulder AROM in FF, Abd, and ext if at least 10 degrees for easier dressing and bathing  - Pt. Will report no greater than 4/10 pain over 4 consecutive tx sessions  - Pt. Will have improved b/l  strength of at least 5# for increased functional hand use  - Pt. Will complete fine motor and pinch testing and goals will be established if appropriate    Long Term Goals: 10 weeks   - Pt. Will be independent with HEP  - Pt. Will have improve L AROM ER of at least 10 degrees  - Pt. Will  have improved b/l shoulder AROM in FF, Abd, and ext if at least 15 degrees for easier dressing and bathing  - Pt. Will report improved hand function for home care tasks      Plan   Certification Period/Plan of care expiration: 7/24/2020 to 10/2/20.    Outpatient Occupational Therapy 2 times weekly for 10 weeks to include the following interventions: Manual Therapy, Moist Heat/ Ice, Paraffin, Patient Education, Self Care, Therapeutic Activites and Therapeutic Exercise.    DENICE Theodore      I certify the need for these services furnished under this plan of treatment and while under my care.  ____________________________________ Physician/Referring Practitioner   Date of Signature

## 2020-07-28 ENCOUNTER — CLINICAL SUPPORT (OUTPATIENT)
Dept: REHABILITATION | Facility: HOSPITAL | Age: 70
End: 2020-07-28
Attending: FAMILY MEDICINE
Payer: MEDICARE

## 2020-07-28 DIAGNOSIS — R26.89 BALANCE PROBLEM: ICD-10-CM

## 2020-07-28 DIAGNOSIS — Z74.09 DECREASED STRENGTH, ENDURANCE, AND MOBILITY: ICD-10-CM

## 2020-07-28 DIAGNOSIS — R68.89 DECREASED STRENGTH, ENDURANCE, AND MOBILITY: ICD-10-CM

## 2020-07-28 DIAGNOSIS — R53.1 DECREASED STRENGTH, ENDURANCE, AND MOBILITY: ICD-10-CM

## 2020-07-28 DIAGNOSIS — R26.9 GAIT ABNORMALITY: ICD-10-CM

## 2020-07-28 PROCEDURE — 97110 THERAPEUTIC EXERCISES: CPT | Mod: PN

## 2020-07-28 NOTE — PROGRESS NOTES
"    Physical Therapy Daily Treatment Note     Name: Bj Pate Jr.  Clinic Number: [id number]    Therapy Diagnosis:   Encounter Diagnoses   Name Primary?    Decreased strength, endurance, and mobility     Gait abnormality     Balance problem      Physician: Lombard, Azikiwe K., MD    Visit Date: 7/28/2020    Physician Orders: PT Eval and Treat      Medical Diagnosis from Referral:   M1A.09X0 (ICD-10-CM) - Idiopathic chronic gout of multiple sites without tophus   R53.81 (ICD-10-CM) - Physical debility      Evaluation Date: 6/9/2020  Authorization Period Expiration: 6/10/2020  Plan of Care Expiration: 9/9/2020  Visit # / Visits authorized: 8/ 12    PN due: 8/22/2020     Time In: 1215 pm   Time Out: 0110 pm  Total Billable Time: 40 minutes     Precautions: Standard and Diabetes      Subjective     Pt reports: that he felt muscle soreness after last PT session. Pt reports he is feeling a little stronger in the lower extremity. He states he will start OT to work on his UE.   He was compliant with home exercise program.  Response to previous treatment: ongoing  Functional change: ongoing    Pain: 5/10  Location: bilateral generalized      Pts goals: "I would like to regain my strength, use of my limbs"   Objective     Bj received therapeutic exercises to develop strength for 45 minutes including:    NuStep x 5' Level 2 hills mode   +UBE 3'F/3'B  Pulleys flexion 3 min   Standing wall walk 2x10  SBAx1   +Freemotion AAROM 3# PoS x 3'  Freemotion shoulder extension 7# 3x10  SBAx1  Fremotion scapular retraction 7# 3x10  SBAx1  Sit<>Stand x 30 18" + Ball Lifts  Standing Marches  W/ Pull Downs x 30  Matrix rows 40# 3x10  Matrix leg extension 40# 2x15  Matrix hamstring curls 40# 2x15  Matrix hip abd 40# 2x 15  Matrix hip add 40# 2x 15  Matrix leg press 40# 2x15      NP:  Pulley Flex x 3'  Pulley PoS x 3'  Standing Rows GTB x 30  Standing Extension GTB x 30  No-Money (Seated) RTB x 30  Standing Palloff Press x " 30      Bj participated in neuromuscular re-education activities to improve: Balance for 00 minutes. The following activities were included:  Static Standing-Feet Together 2x1'  Semi-Tandem Standing 2x1'  Semi-Tandem Weight Shifting 2 x 30    +Static Standing on AirEx Feet Together x3'  +Static Standing on AirEx Feet Apart x 3'  +Static Standing on BOSU Feet Together x 3'  +Static Standing on BOSU Feet Apart x 3'      Home Exercises Provided and Patient Education Provided     Education provided:   Cont to perform HEP as provided.     Written Home Exercises Provided: Patient instructed to cont prior HEP.  Exercises were reviewed and Bj was able to demonstrate them prior to the end of the session.  Bj demonstrated fair  understanding of the education provided.     See EMR under Patient Instructions for exercises provided 06/09/2020.    Assessment     Pt presents to therapy with FWW with noted anterior lean and dependence on walker. Pt tolerated UE exercises well. L shoulder AROM and muscle strength was performed today with mod v/c's to avoid trunk lean. He demonstrated anterior shoulder pain at the end of range of motion. Lower extremity exercises was performed with matrix machine. He was independent to get in and out of the machines. He tolerated well 2x15 reps with increase muscle fatigue. Several rest breaks was needed in therapy. Pt demonstrated visible B LE muscle weakness and decrease endurance. He started OT to work on UE. Plan to work on LE during physical therapy.     Bj is progressing well towards his goals.   Pt prognosis is Fair.     Pt will continue to benefit from skilled outpatient physical therapy to address the deficits listed in the problem list box on initial evaluation, provide pt/family education and to maximize pt's level of independence in the home and community environment.     Pt's spiritual, cultural and educational needs considered and pt agreeable to plan of care and  goals.    Anticipated barriers to physical therapy: none    Goals:   Short Term Goals: 4 weeks   1. Pt to improve UE strength by > / = 1/2 MMT score in order to improve tolerance to daily activities. Goal In progress  7-  2. Pt to improve B LE strength by > 1/2 MMT score in order to improve functional indepdence. Goal in progress  7-  3. Pt to demonstrate improvement in gait speed to > / = m/s in order to demonstrate improved community ambulation Goal in progress 7-.  4. Pt to improve condition 4 of MSTCIB to > / = 20 seconds in order to improve safety.goal in progress 7-  5. Pt to ambulate x 100 ft without assistive device and with min A in order to demonstrate improved functional independence. Goal in progress  7-     Long Term Goals: 8 weeks   1. Pt to improve UE strength by > / = 1 MMT score in order to improve tolerance to daily activities.  2. Pt to improve B LE strength by > 1 MMT score in order to improve functional indepdence.   3. Pt to demonstrate improvement in gait speed to > / = m/s in order to demonstrate improved community ambulation.  4. Pt to improve condition 4 of MSTCIB to 30 seconds in order to improve safety.  5. Pt to ambulate x 100 ft without assistive device independently in order to demonstrate improved functional independence.    Plan     Plan of care Certification: 6/9/2020 to 9/9/2020.     Outpatient Physical Therapy 2 times weekly for 8 weeks to include the following interventions: Gait Training, Manual Therapy, Moist Heat/ Ice, Neuromuscular Re-ed, Patient Education, Therapeutic Activites and Therapeutic Exercise.     Cont skilled PT session towards PT and patient's goals.    Josue Rodriguez, PT   07/28/2020

## 2020-07-30 ENCOUNTER — OFFICE VISIT (OUTPATIENT)
Dept: FAMILY MEDICINE | Facility: CLINIC | Age: 70
End: 2020-07-30
Payer: MEDICARE

## 2020-07-30 ENCOUNTER — CLINICAL SUPPORT (OUTPATIENT)
Dept: REHABILITATION | Facility: HOSPITAL | Age: 70
End: 2020-07-30
Attending: FAMILY MEDICINE
Payer: MEDICARE

## 2020-07-30 VITALS
WEIGHT: 299.38 LBS | RESPIRATION RATE: 16 BRPM | OXYGEN SATURATION: 97 % | HEIGHT: 69 IN | DIASTOLIC BLOOD PRESSURE: 70 MMHG | BODY MASS INDEX: 44.34 KG/M2 | SYSTOLIC BLOOD PRESSURE: 108 MMHG | HEART RATE: 91 BPM | TEMPERATURE: 99 F

## 2020-07-30 DIAGNOSIS — G56.23 ULNAR NEUROPATHY OF BOTH UPPER EXTREMITIES: ICD-10-CM

## 2020-07-30 DIAGNOSIS — R53.1 DECREASED STRENGTH, ENDURANCE, AND MOBILITY: ICD-10-CM

## 2020-07-30 DIAGNOSIS — E78.5 DYSLIPIDEMIA: ICD-10-CM

## 2020-07-30 DIAGNOSIS — M47.22 OSTEOARTHRITIS OF SPINE WITH RADICULOPATHY, CERVICAL REGION: ICD-10-CM

## 2020-07-30 DIAGNOSIS — R26.89 BALANCE PROBLEM: ICD-10-CM

## 2020-07-30 DIAGNOSIS — R60.0 PERIPHERAL EDEMA: ICD-10-CM

## 2020-07-30 DIAGNOSIS — R26.9 GAIT ABNORMALITY: ICD-10-CM

## 2020-07-30 DIAGNOSIS — J44.9 COPD, MODERATE: ICD-10-CM

## 2020-07-30 DIAGNOSIS — M1A.09X0 IDIOPATHIC CHRONIC GOUT OF MULTIPLE SITES WITHOUT TOPHUS: ICD-10-CM

## 2020-07-30 DIAGNOSIS — R68.89 DECREASED STRENGTH, ENDURANCE, AND MOBILITY: ICD-10-CM

## 2020-07-30 DIAGNOSIS — Z74.09 DECREASED STRENGTH, ENDURANCE, AND MOBILITY: ICD-10-CM

## 2020-07-30 DIAGNOSIS — I10 ESSENTIAL HYPERTENSION: Primary | ICD-10-CM

## 2020-07-30 PROBLEM — Z79.4 TYPE 2 DIABETES MELLITUS WITH HYPERGLYCEMIA, WITH LONG-TERM CURRENT USE OF INSULIN: Status: RESOLVED | Noted: 2020-03-06 | Resolved: 2020-07-30

## 2020-07-30 PROBLEM — E11.42 DIABETIC POLYNEUROPATHY ASSOCIATED WITH TYPE 2 DIABETES MELLITUS: Status: RESOLVED | Noted: 2018-01-30 | Resolved: 2020-07-30

## 2020-07-30 PROBLEM — E11.9 DIET-CONTROLLED DIABETES MELLITUS: Status: RESOLVED | Noted: 2017-03-07 | Resolved: 2020-07-30

## 2020-07-30 PROBLEM — E11.65 TYPE 2 DIABETES MELLITUS WITH HYPERGLYCEMIA, WITH LONG-TERM CURRENT USE OF INSULIN: Status: RESOLVED | Noted: 2020-03-06 | Resolved: 2020-07-30

## 2020-07-30 PROCEDURE — 3288F PR FALLS RISK ASSESSMENT DOCUMENTED: ICD-10-PCS | Mod: CPTII,S$GLB,, | Performed by: FAMILY MEDICINE

## 2020-07-30 PROCEDURE — 99214 OFFICE O/P EST MOD 30 MIN: CPT | Mod: S$GLB,,, | Performed by: FAMILY MEDICINE

## 2020-07-30 PROCEDURE — 99999 PR PBB SHADOW E&M-EST. PATIENT-LVL IV: CPT | Mod: PBBFAC,,, | Performed by: FAMILY MEDICINE

## 2020-07-30 PROCEDURE — 3008F PR BODY MASS INDEX (BMI) DOCUMENTED: ICD-10-PCS | Mod: CPTII,S$GLB,, | Performed by: FAMILY MEDICINE

## 2020-07-30 PROCEDURE — 1159F PR MEDICATION LIST DOCUMENTED IN MEDICAL RECORD: ICD-10-PCS | Mod: S$GLB,,, | Performed by: FAMILY MEDICINE

## 2020-07-30 PROCEDURE — 1159F MED LIST DOCD IN RCRD: CPT | Mod: S$GLB,,, | Performed by: FAMILY MEDICINE

## 2020-07-30 PROCEDURE — 3074F SYST BP LT 130 MM HG: CPT | Mod: CPTII,S$GLB,, | Performed by: FAMILY MEDICINE

## 2020-07-30 PROCEDURE — 3078F DIAST BP <80 MM HG: CPT | Mod: CPTII,S$GLB,, | Performed by: FAMILY MEDICINE

## 2020-07-30 PROCEDURE — 1125F AMNT PAIN NOTED PAIN PRSNT: CPT | Mod: S$GLB,,, | Performed by: FAMILY MEDICINE

## 2020-07-30 PROCEDURE — 3078F PR MOST RECENT DIASTOLIC BLOOD PRESSURE < 80 MM HG: ICD-10-PCS | Mod: CPTII,S$GLB,, | Performed by: FAMILY MEDICINE

## 2020-07-30 PROCEDURE — 1100F PTFALLS ASSESS-DOCD GE2>/YR: CPT | Mod: CPTII,S$GLB,, | Performed by: FAMILY MEDICINE

## 2020-07-30 PROCEDURE — 99214 PR OFFICE/OUTPT VISIT, EST, LEVL IV, 30-39 MIN: ICD-10-PCS | Mod: S$GLB,,, | Performed by: FAMILY MEDICINE

## 2020-07-30 PROCEDURE — 99999 PR PBB SHADOW E&M-EST. PATIENT-LVL IV: ICD-10-PCS | Mod: PBBFAC,,, | Performed by: FAMILY MEDICINE

## 2020-07-30 PROCEDURE — 99499 UNLISTED E&M SERVICE: CPT | Mod: S$GLB,,, | Performed by: FAMILY MEDICINE

## 2020-07-30 PROCEDURE — 1100F PR PT FALLS ASSESS DOC 2+ FALLS/FALL W/INJURY/YR: ICD-10-PCS | Mod: CPTII,S$GLB,, | Performed by: FAMILY MEDICINE

## 2020-07-30 PROCEDURE — 97110 THERAPEUTIC EXERCISES: CPT | Mod: PN,CQ

## 2020-07-30 PROCEDURE — 3074F PR MOST RECENT SYSTOLIC BLOOD PRESSURE < 130 MM HG: ICD-10-PCS | Mod: CPTII,S$GLB,, | Performed by: FAMILY MEDICINE

## 2020-07-30 PROCEDURE — 3288F FALL RISK ASSESSMENT DOCD: CPT | Mod: CPTII,S$GLB,, | Performed by: FAMILY MEDICINE

## 2020-07-30 PROCEDURE — 99499 RISK ADDL DX/OHS AUDIT: ICD-10-PCS | Mod: S$GLB,,, | Performed by: FAMILY MEDICINE

## 2020-07-30 PROCEDURE — 1125F PR PAIN SEVERITY QUANTIFIED, PAIN PRESENT: ICD-10-PCS | Mod: S$GLB,,, | Performed by: FAMILY MEDICINE

## 2020-07-30 PROCEDURE — 3008F BODY MASS INDEX DOCD: CPT | Mod: CPTII,S$GLB,, | Performed by: FAMILY MEDICINE

## 2020-07-30 RX ORDER — FUROSEMIDE 40 MG/1
40 TABLET ORAL 2 TIMES DAILY
Qty: 60 TABLET | Refills: 0 | Status: SHIPPED | OUTPATIENT
Start: 2020-07-30 | End: 2020-08-04

## 2020-07-30 RX ORDER — GABAPENTIN 600 MG/1
600 TABLET ORAL 3 TIMES DAILY
Qty: 90 TABLET | Refills: 2 | Status: SHIPPED | OUTPATIENT
Start: 2020-07-30 | End: 2020-10-26

## 2020-07-30 RX ORDER — FLUTICASONE PROPIONATE AND SALMETEROL 50; 250 UG/1; UG/1
1 POWDER RESPIRATORY (INHALATION) 2 TIMES DAILY
Qty: 60 EACH | Refills: 3 | Status: SHIPPED | OUTPATIENT
Start: 2020-07-30 | End: 2020-12-22 | Stop reason: SDUPTHER

## 2020-07-30 RX ORDER — ATORVASTATIN CALCIUM 40 MG/1
40 TABLET, FILM COATED ORAL DAILY
Qty: 90 TABLET | Refills: 1 | Status: SHIPPED | OUTPATIENT
Start: 2020-07-30 | End: 2020-11-19 | Stop reason: SDUPTHER

## 2020-07-30 NOTE — PROGRESS NOTES
Chief Complaint   Patient presents with    Hypertension     2 mo follow up     Leg Swelling       Bj Pate Jr. is a 70 y.o. male who presents per the Chief Complaint.  Pt is known to me and was last seen by me on 5/26/2020.  All known chronic medical issues have been documented.    Hypertension  This is a chronic problem. The current episode started more than 1 year ago. The problem has been gradually worsening since onset. The problem is uncontrolled. Pertinent negatives include no anxiety, blurred vision, chest pain, headaches, malaise/fatigue, neck pain, orthopnea, palpitations, peripheral edema, PND, shortness of breath or sweats. Agents associated with hypertension include amphetamines. Risk factors for coronary artery disease include obesity, male gender, dyslipidemia and sedentary lifestyle. Past treatments include ACE inhibitors, calcium channel blockers and diuretics. The current treatment provides moderate improvement. Compliance problems include exercise.  There is no history of angina, kidney disease (improved), CAD/MI, CVA, heart failure, left ventricular hypertrophy, PVD or retinopathy. Identifiable causes of hypertension include sleep apnea. There is no history of chronic renal disease (improved), coarctation of the aorta, hyperaldosteronism, hypercortisolism, hyperparathyroidism, a hypertension causing med, pheochromocytoma, renovascular disease or a thyroid problem.       ROS  Review of Systems   Constitutional: Positive for activity change and unexpected weight change. Negative for appetite change, chills, diaphoresis, fatigue, fever and malaise/fatigue.   HENT: Negative.  Negative for congestion, ear pain, hearing loss, nosebleeds, postnasal drip, rhinorrhea, sinus pressure, sneezing, sore throat and trouble swallowing.    Eyes: Negative for blurred vision, pain, discharge and visual disturbance.   Respiratory: Positive for wheezing. Negative for cough, choking, chest tightness and  "shortness of breath.    Cardiovascular: Positive for leg swelling. Negative for chest pain, palpitations, orthopnea and PND.   Gastrointestinal: Negative for abdominal pain, blood in stool, constipation, diarrhea, nausea and vomiting.   Endocrine: Negative for polydipsia and polyuria.   Genitourinary: Negative for difficulty urinating, dysuria, frequency, hematuria and urgency.   Musculoskeletal: Positive for arthralgias (multiple joint swelling and pain; improved), gait problem and joint swelling (resolved). Negative for back pain, myalgias and neck pain.   Skin: Negative.    Allergic/Immunologic: Negative for environmental allergies and food allergies.   Neurological: Positive for weakness. Negative for dizziness, seizures, syncope, light-headedness and headaches.   Psychiatric/Behavioral: Negative.  Negative for confusion, decreased concentration, dysphoric mood and sleep disturbance. The patient is not nervous/anxious.        Physical Exam  Vitals:    07/30/20 1403   BP: 108/70   Pulse: 91   Resp: 16   Temp: 98.8 °F (37.1 °C)    Body mass index is 44.21 kg/m².  Weight: 135.8 kg (299 lb 6.2 oz)   Height: 5' 9" (175.3 cm)     Physical Exam  Constitutional:       General: He is not in acute distress.     Appearance: Normal appearance. He is well-developed. He is not ill-appearing, toxic-appearing or diaphoretic.   HENT:      Head: Normocephalic and atraumatic.      Right Ear: Hearing and external ear normal. No decreased hearing noted.      Left Ear: Hearing and external ear normal. No decreased hearing noted.      Nose: Nose normal. No nasal deformity or rhinorrhea.      Mouth/Throat:      Dentition: Normal dentition. Does not have dentures.      Pharynx: Uvula midline.   Eyes:      General: Lids are normal. No scleral icterus.        Right eye: No foreign body or discharge.         Left eye: No foreign body or discharge.      Conjunctiva/sclera: Conjunctivae normal.      Right eye: No chemosis or exudate.     " Left eye: No chemosis or exudate.     Pupils: Pupils are equal, round, and reactive to light.   Neck:      Musculoskeletal: Full passive range of motion without pain, normal range of motion and neck supple.   Cardiovascular:      Rate and Rhythm: Normal rate and regular rhythm.      Heart sounds: Normal heart sounds, S1 normal and S2 normal. No murmur. No friction rub. No gallop.    Pulmonary:      Effort: Pulmonary effort is normal. No accessory muscle usage or respiratory distress.      Breath sounds: Normal breath sounds. No decreased breath sounds, wheezing, rhonchi or rales.   Abdominal:      General: There is no distension.      Palpations: Abdomen is soft. Abdomen is not rigid.      Tenderness: There is no abdominal tenderness. There is no guarding or rebound.   Musculoskeletal: Normal range of motion.      Right ankle: He exhibits swelling. He exhibits normal range of motion, no ecchymosis, no deformity, no laceration and normal pulse. No tenderness. No lateral malleolus, no medial malleolus, no AITFL, no CF ligament, no posterior TFL, no head of 5th metatarsal and no proximal fibula tenderness found.      Left ankle: He exhibits swelling. He exhibits normal range of motion, no ecchymosis, no deformity, no laceration and normal pulse. No tenderness. No lateral malleolus, no medial malleolus, no AITFL, no CF ligament, no posterior TFL, no head of 5th metatarsal and no proximal fibula tenderness found.      Cervical back: He exhibits tenderness and pain. He exhibits normal range of motion, no bony tenderness, no swelling, no edema, no deformity, no laceration, no spasm and normal pulse.      Right hand: He exhibits normal range of motion, no tenderness, no bony tenderness, normal two-point discrimination, normal capillary refill, no deformity, no laceration and no swelling. Decreased sensation noted. Normal strength noted.      Left hand: He exhibits normal range of motion, no tenderness, no bony tenderness,  normal two-point discrimination, normal capillary refill, no deformity, no laceration and no swelling. Decreased sensation noted. Normal strength noted.        Hands:       Comments: Fingertips with decreased sensation; capillary refill appears normal.   Skin:     General: Skin is warm and dry.      Findings: No rash.   Neurological:      Mental Status: He is alert and oriented to person, place, and time.      Cranial Nerves: No cranial nerve deficit.      Sensory: No sensory deficit.      Motor: No abnormal muscle tone or seizure activity.      Coordination: Coordination normal.      Gait: Gait normal.   Psychiatric:         Attention and Perception: He is attentive.         Speech: Speech normal.         Behavior: Behavior normal. Behavior is cooperative.         Thought Content: Thought content normal.         Judgment: Judgment normal.         Assessment & Plan    Discussion of plan of care including treatment options regarding health and wellness were reviewed and discussed with patient.  Any changes to medication or treatment plan, as well as any screening blood test, imaging, or referrals to specialist, are documented.  Follow up as indicated.     1. Essential hypertension  Patient was counseled and encouraged to maintain a low sodium diet, as well as increasing physical activity.  Recommend random BP checks at home on a regular basis.  Repeat BP at end of visit was not necessary. Will continue medication at this time, and follow up in 3-6 months, or sooner if blood pressure begins to increase.       2. Ulnar neuropathy of both upper extremities  Will restart gabapentin; likely related to cervical arthritis.  - gabapentin (NEURONTIN) 600 MG tablet; Take 1 tablet (600 mg total) by mouth 3 (three) times daily.  Dispense: 90 tablet; Refill: 2    3. Osteoarthritis of spine with radiculopathy, cervical region  Stable; no therapeutic changes at this time.  Restart gabapentin.  - gabapentin (NEURONTIN) 600 MG  tablet; Take 1 tablet (600 mg total) by mouth 3 (three) times daily.  Dispense: 90 tablet; Refill: 2    4. Idiopathic chronic gout of multiple sites without tophus  Stable; no therapeutic changes at this time. The current medical regimen will be continued at this time as discussed.     5. COPD, moderate  The current medical regimen will be continued at this time as discussed.   - ADVAIR DISKUS 250-50 mcg/dose diskus inhaler; Inhale 1 puff into the lungs 2 (two) times daily. Controller  Dispense: 60 each; Refill: 3    6. Dyslipidemia  We discussed ways to manage cholesterol levels, including increasing whole grain foods and decreasing fried and fatty foods.  I also recommended OTC Omega 3 and Omega 6 supplements to improve overall cholesterol levels.  Will continue current therapy at this visit and will monitor lipids appropriately.   - atorvastatin (LIPITOR) 40 MG tablet; Take 1 tablet (40 mg total) by mouth once daily.  Dispense: 90 tablet; Refill: 1    7. Peripheral edema  The current medical regimen will be continued at this time as discussed.   - furosemide (LASIX) 40 MG tablet; Take 1 tablet (40 mg total) by mouth 2 (two) times daily.  Dispense: 60 tablet; Refill: 0       Follow up in about 6 months (around 1/30/2021) for Chronic Disease Management.      ACTIVE MEDICAL ISSUES:  Documented in Problem List    PAST MEDICAL HISTORY  Documented  .  PAST SURGICAL HISTORY:  Documented    SOCIAL HISTORY:  Documented    FAMILY HISTORY:  Documented    ALLERGIES AND MEDICATIONS: updated and reviewed.  Documented    Health Maintenance       Date Due Completion Date    Shingles Vaccine (1 of 2) 01/21/2002 ---    Foot Exam 01/30/2019 1/30/2018    Hemoglobin A1c 06/07/2020 3/7/2020    Override on 4/8/2016: Done (future)    Override on 1/4/2016: Done (future)    Override on 8/22/2014: Done    Lipid Panel 08/19/2020 8/19/2019    Override on 8/22/2014: Done    Colorectal Cancer Screening 08/20/2020 8/20/2019    Override on  2/21/2005: Done    Influenza Vaccine (1) 09/01/2020 9/12/2019    Eye Exam 11/27/2020 11/27/2019    Override on 8/22/2014: Done    TETANUS VACCINE 02/19/2026 2/19/2016

## 2020-07-30 NOTE — PROGRESS NOTES
"    Physical Therapy Daily Treatment Note     Name: Bj Pate Jr.  Clinic Number: [id number]    Therapy Diagnosis:   Encounter Diagnoses   Name Primary?    Decreased strength, endurance, and mobility     Gait abnormality     Balance problem      Physician: Lombard, Azikiwe K., MD    Visit Date: 7/30/2020    Physician Orders: PT Eval and Treat      Medical Diagnosis from Referral:   M1A.09X0 (ICD-10-CM) - Idiopathic chronic gout of multiple sites without tophus   R53.81 (ICD-10-CM) - Physical debility      Evaluation Date: 6/9/2020  Authorization Period Expiration: 6/10/2020  Plan of Care Expiration: 9/9/2020  Visit # / Visits authorized: 9/ 12    PN due: 8/22/2020     Time In: 0346 pm   Time Out: 0430 pm  Total Billable Time: 40 minutes     Precautions: Standard and Diabetes      Subjective     Pt reports: that he felt muscle soreness after last PT session. Pt reports he is feeling a little stronger in the lower extremity as well as ability to elevate BUE more since last session.    He was compliant with home exercise program.  Response to previous treatment: ongoing  Functional change: ongoing    Pain: 5/10  Location: bilateral generalized      Pts goals: "I would like to regain my strength, use of my limbs"   Objective     Bj received therapeutic exercises to develop strength for 45 minutes including:    NuStep x 5' Level 2 hills mode   +UBE 3'F/3'B  Pulleys flexion 3 min   Standing wall walk 2x10  SBAx1   +Freemotion AAROM 3# PoS x 3'  Freemotion shoulder extension 7# 3x10  SBAx1  Fremotion scapular retraction 7# 3x10  SBAx1  Sit<>Stand x 30 18" + Ball Lifts  Standing Marches  W/ Pull Downs x 30  Matrix rows 40# 3x10  Matrix leg extension 40# 2x15  Matrix hamstring curls 40# 2x15  Matrix hip abd 40# 2x 15  Matrix hip add 40# 2x 15  Matrix leg press 40# 2x15      NP:  Pulley Flex x 3'  Pulley PoS x 3'  Standing Rows GTB x 30  Standing Extension GTB x 30  No-Money (Seated) RTB x 30  Standing Palloff Press " x 30      Bj participated in neuromuscular re-education activities to improve: Balance for 00 minutes. The following activities were included:  Static Standing-Feet Together 2x1'  Semi-Tandem Standing 2x1'  Semi-Tandem Weight Shifting 2 x 30    +Static Standing on AirEx Feet Together x3'  +Static Standing on AirEx Feet Apart x 3'  +Static Standing on BOSU Feet Together x 3'  +Static Standing on BOSU Feet Apart x 3'      Home Exercises Provided and Patient Education Provided     Education provided:   Cont to perform HEP as provided.     Written Home Exercises Provided: Patient instructed to cont prior HEP.  Exercises were reviewed and Bj was able to demonstrate them prior to the end of the session.  Bj demonstrated fair  understanding of the education provided.     See EMR under Patient Instructions for exercises provided 06/09/2020.    Assessment     Pt presents to therapy with FWW with noted anterior lean and dependence on walker. Pt tolerated UE exercises well. Lower extremity exercises was performed with matrix machine. He was independent to get in and out of the machines. He tolerated well 2x20 reps with increase muscle fatigue. Several rest breaks was needed in therapy. Pt demonstrated visible B LE muscle weakness and decrease endurance. He has started OT to work on UE. Plan to work on LE during physical therapy.     Bj is progressing well towards his goals.   Pt prognosis is Fair.     Pt will continue to benefit from skilled outpatient physical therapy to address the deficits listed in the problem list box on initial evaluation, provide pt/family education and to maximize pt's level of independence in the home and community environment.     Pt's spiritual, cultural and educational needs considered and pt agreeable to plan of care and goals.    Anticipated barriers to physical therapy: none    Goals:   Short Term Goals: 4 weeks   1. Pt to improve UE strength by > / = 1/2 MMT score in order to  improve tolerance to daily activities. Goal In progress  7-  2. Pt to improve B LE strength by > 1/2 MMT score in order to improve functional indepdence. Goal in progress  7-  3. Pt to demonstrate improvement in gait speed to > / = m/s in order to demonstrate improved community ambulation Goal in progress 7-.  4. Pt to improve condition 4 of MSTCIB to > / = 20 seconds in order to improve safety.goal in progress 7-  5. Pt to ambulate x 100 ft without assistive device and with min A in order to demonstrate improved functional independence. Goal in progress  7-     Long Term Goals: 8 weeks   1. Pt to improve UE strength by > / = 1 MMT score in order to improve tolerance to daily activities.  2. Pt to improve B LE strength by > 1 MMT score in order to improve functional indepdence.   3. Pt to demonstrate improvement in gait speed to > / = m/s in order to demonstrate improved community ambulation.  4. Pt to improve condition 4 of MSTCIB to 30 seconds in order to improve safety.  5. Pt to ambulate x 100 ft without assistive device independently in order to demonstrate improved functional independence.    Plan     Plan of care Certification: 6/9/2020 to 9/9/2020.     Outpatient Physical Therapy 2 times weekly for 8 weeks to include the following interventions: Gait Training, Manual Therapy, Moist Heat/ Ice, Neuromuscular Re-ed, Patient Education, Therapeutic Activites and Therapeutic Exercise.     Cont skilled PT session towards PT and patient's goals.    Mak Mohamud, PTA   07/30/2020

## 2020-08-02 NOTE — PROGRESS NOTES
"  Occupational Therapy Treatment Note     Date: 8/5/2020  Name: Bj Pate Jr.  Clinic Number: [id number]    Therapy Diagnosis:   Encounter Diagnoses   Name Primary?    Decreased ROM of left shoulder Yes    Decreased ROM of right shoulder     Alteration in instrumental activities of daily living (IADL)     Decreased  strength      Physician: Lombard, Azikiwe K., MD    Physician Orders: OT Eval and treat  Medical Diagnosis: CVA  Evaluation Date: 7/24/2020  Plan of Care Expiration Period: 10/2/20  Insurance Authorization period Expiration: 8/14/20  Date of Return to MD: YOUNG  Visit # / Visits Authorized: 1 / 6  FOTO: 5th visit     Time In:1:45pm  Time Out: 2:30pm  Total Billable (one on one) Time: 45 minutes     Precautions: Standard and Fall    Subjective     Pt reports: "I can feel my hands getting tired"    Response to previous treatment:first tx  Functional change: n/a    Pain: 0/10  Location: n/a    Objective       Bj received therapeutic exercises for 45 minutes including:      Sensation   8/5/20 8/5/20    Right Left   [place]     Normal 1.65-2.83 X X   Diminished Light Touch 3.22-3.61 X Median, Ulnar, Radial   Diminished Protective 3.84-4.31 X    Loss of Protective 4.56-6.65 Median,Radial, Ulnar nerve    Untestable >6.65     2 Point Discrimination Positive <5mm     Static     Dynamic           Pinch Strength (Measured in psi)     7/31/20 7/31/20    Left Right   Key Pinch 15 psi 14 psi   3pt Pinch 12 psi 8 psi   2pt Pinch 11 psi 7 psi     Fine Motor Coordination: 9 Hole Peg Test  Left 7/31/20 Right 7/31/20   1:01.84  Several drops 1:02.56  2 drops     Initial  HEP 1x20 B hands green putty   - 2pt. Pinch   - 3pt. Pinch   - Key pinch   - Gross grasp   - MP flex  - digit ext. Yellow band 1x20 B hands       Home Exercises and Education Provided     Education provided:   -  HEP  - Progress towards goals     Written Home Exercises Provided: yes.  Exercises were reviewed and Bj was able " to demonstrate them prior to the end of the session.  Bj demonstrated good  understanding of the HEP provided.   .   See EMR under Patient Instructions for exercises provided 8/5/20.        Assessment     Pt would continue to benefit from skilled OT. Bj had good tolerance to tx this date.  HEP reviewed with good understating. Despite previous reports of normal sensation today's testing identified loss of protective touch in L hand and diminished light touch in L hand. Fine motor tested with LTG established. He continues with b/l weakness affecting his ability to complete ADL/IADLs.      Bj is progressing well towards his goals and there are no updates to goals at this time. Pt prognosis is Good.     Pt will continue to benefit from skilled outpatient occupational therapy to address the deficits listed in the problem list on initial evaluation provide pt/family education and to maximize pt's level of independence in the home and community environment.     Anticipated barriers to occupational therapy: None    Pt's spiritual, cultural and educational needs considered and pt agreeable to plan of care and goals.    Goals:  Short Term Goals: 5 weeks   - Pt. Will be educated in HEP   - Pt. Will have improved b/l shoulder AROM in FF, Abd, and ext if at least 10 degrees for easier dressing and bathing  - Pt. Will report no greater than 4/10 pain over 4 consecutive tx sessions  - Pt. Will have improved b/l  strength of at least 5# for increased functional hand use  - Pt. Will complete fine motor and pinch testing and goals will be established if appropriate     Long Term Goals: 10 weeks   - Pt. Will be independent with HEP  - Pt. Will have improve L AROM ER of at least 10 degrees  - Pt. Will have improved b/l shoulder AROM in FF, Abd, and ext if at least 15 degrees for easier dressing and bathing  - Pt. Will report improved hand function for home care tasks  - Pt. Will have improved 9 HPT of no greater  than 50 sec for improved ability to complete buttoning         Plan   Certification Period/Plan of care expiration: 7/24/2020 to 10/2/20.     Outpatient Occupational Therapy 2 times weekly for 10 weeks to include the following interventions: Manual Therapy, Moist Heat/ Ice, Paraffin, Patient Education, Self Care, Therapeutic Activites and Therapeutic Exercise.       Updates/Grading for next session: as tolerated       DENICE Theodore

## 2020-08-03 ENCOUNTER — CLINICAL SUPPORT (OUTPATIENT)
Dept: REHABILITATION | Facility: HOSPITAL | Age: 70
End: 2020-08-03
Attending: FAMILY MEDICINE
Payer: MEDICARE

## 2020-08-03 DIAGNOSIS — R26.89 BALANCE PROBLEM: ICD-10-CM

## 2020-08-03 DIAGNOSIS — R26.9 GAIT ABNORMALITY: ICD-10-CM

## 2020-08-03 DIAGNOSIS — R53.1 DECREASED STRENGTH, ENDURANCE, AND MOBILITY: ICD-10-CM

## 2020-08-03 DIAGNOSIS — R68.89 DECREASED STRENGTH, ENDURANCE, AND MOBILITY: ICD-10-CM

## 2020-08-03 DIAGNOSIS — Z74.09 DECREASED STRENGTH, ENDURANCE, AND MOBILITY: ICD-10-CM

## 2020-08-03 PROCEDURE — 97110 THERAPEUTIC EXERCISES: CPT | Mod: PN

## 2020-08-03 NOTE — PROGRESS NOTES
"    Physical Therapy Daily Treatment Note     Name: Bj Pate Jr.  Clinic Number: [id number]    Therapy Diagnosis:   Encounter Diagnoses   Name Primary?    Decreased strength, endurance, and mobility     Gait abnormality     Balance problem      Physician: Lombard, Azikiwe K., MD    Visit Date: 8/3/2020    Physician Orders: PT Eval and Treat      Medical Diagnosis from Referral:   M1A.09X0 (ICD-10-CM) - Idiopathic chronic gout of multiple sites without tophus   R53.81 (ICD-10-CM) - Physical debility      Evaluation Date: 6/9/2020  Authorization Period Expiration: 6/10/2020  Plan of Care Expiration: 9/9/2020  Visit # / Visits authorized: 10/ 12    PN due: 8/22/2020     Time In: 3:45 pm   Time Out: 4:45pm  Total Billable Time: 60 minutes     Precautions: Standard and Diabetes      Subjective     Pt reports: some quad mm soreness this afternoon after performing mini squats at home this weekend. Pt reports being ready to work this afternoon.     He was compliant with home exercise program.  Response to previous treatment: ongoing  Functional change: ongoing    Pain: 5/10  Location: bilateral shoulder     Pts goals: "I would like to regain my strength, use of my limbs"   Objective     Bj received therapeutic exercises to develop strength for 45 minutes including:    NuStep x 5' Level 2 hills mode   +Standing hip 4 ways No T-band 2x10   + Mini squat 2x10    Sit<>Stand x 30 18" + Ball Lifts NP  Standing Marches  2x10  +side steps inside parallel bar 3 laps  STep up and down 4 in box with B UE support   Matrix rows 40# 3x10  Matrix leg extension 40# 2x15  Matrix hamstring curls 45# 2x10  Matrix hip abd 45# 2x 10  Matrix hip add 45# 2x 10  Matrix leg press 45# 2x10  + Standing heel raises 1x10       NP:  Pulley Flex x 3'  Pulley PoS x 3'  Standing Rows GTB x 30  Standing Extension GTB x 30  No-Money (Seated) RTB x 30  Standing Palloff Press x 30      Bj participated in neuromuscular re-education activities to " improve: Balance for 00 minutes. The following activities were included:  Static Standing-Feet Together 2x1' NP  Semi-Tandem Standing 2x1' NP  Semi-Tandem Weight Shifting 2 x 30 NP  +Static Standing on AirEx Feet Together x3' NP  +Static Standing on AirEx Feet Apart x 3' NP  +Static Standing on BOSU Feet Together x 3' NP  +Static Standing on BOSU Feet Apart x 3' NP      Home Exercises Provided and Patient Education Provided     Education provided:   Cont to perform HEP as provided.     Written Home Exercises Provided: Patient instructed to cont prior HEP.  Exercises were reviewed and Bj was able to demonstrate them prior to the end of the session.  Bj demonstrated fair  understanding of the education provided.     See EMR under Patient Instructions for exercises provided 06/09/2020.    Assessment       Pt presents to therapy with FWW with noted anterior lean and dependence on walker, and when ambulating without AD notable L lateral trunk lean is present. Pt tolerated progression of CKC LE machine exercises well, but reported muscles feeling tired after PT today.  Pt required heavy v/c to perform standing hip extension, and heavy verbal, visual, and tactile cues to perform mini squats with proper form. Pt required many rest breaks during treatment and quality of motion declined as pt became tired. Pt with very limited bilateral PF strength in WB, pt would benefit from NWB PF strengthening. Pt's muscle endurance still decreased and pt struggles with isolated hip extension and abduction with proper form.  Pt is motivated to participate in therapy, but limited muscular endurance continues to be a barrier to improvement. Pt would benefit from continued skilled PT services.       Bj is progressing well towards his goals.   Pt prognosis is Fair.     Pt will continue to benefit from skilled outpatient physical therapy to address the deficits listed in the problem list box on initial evaluation, provide pt/family  education and to maximize pt's level of independence in the home and community environment.     Pt's spiritual, cultural and educational needs considered and pt agreeable to plan of care and goals.    Anticipated barriers to physical therapy: none    Goals:   Short Term Goals: 4 weeks   1. Pt to improve UE strength by > / = 1/2 MMT score in order to improve tolerance to daily activities. Goal In progress  7-  2. Pt to improve B LE strength by > 1/2 MMT score in order to improve functional indepdence. Goal in progress  7-  3. Pt to demonstrate improvement in gait speed to > / = m/s in order to demonstrate improved community ambulation Goal in progress 7-.  4. Pt to improve condition 4 of MSTCIB to > / = 20 seconds in order to improve safety.goal in progress 7-  5. Pt to ambulate x 100 ft without assistive device and with min A in order to demonstrate improved functional independence. Goal in progress  7-     Long Term Goals: 8 weeks   1. Pt to improve UE strength by > / = 1 MMT score in order to improve tolerance to daily activities.  2. Pt to improve B LE strength by > 1 MMT score in order to improve functional indepdence.   3. Pt to demonstrate improvement in gait speed to > / = m/s in order to demonstrate improved community ambulation.  4. Pt to improve condition 4 of MSTCIB to 30 seconds in order to improve safety.  5. Pt to ambulate x 100 ft without assistive device independently in order to demonstrate improved functional independence.    Plan      Plan of care Certification: 6/9/2020 to 9/9/2020.     Outpatient Physical Therapy 2 times weekly for 8 weeks to include the following interventions: Gait Training, Manual Therapy, Moist Heat/ Ice, Neuromuscular Re-ed, Patient Education, Therapeutic Activites and Therapeutic Exercise.     Cont skilled PT session towards PT and patient's goals.    Josue Rodriguez, PT & Cass Kaur, SPT   08/03/2020    I certify that I was present  in the room directing the student in service delivery and guiding them using my skilled judgment. As the co-signing therapist I have reviewed the students documentation and am responsible for the treatment, assessment, and plan.

## 2020-08-05 ENCOUNTER — CLINICAL SUPPORT (OUTPATIENT)
Dept: REHABILITATION | Facility: HOSPITAL | Age: 70
End: 2020-08-05
Attending: FAMILY MEDICINE
Payer: MEDICARE

## 2020-08-05 DIAGNOSIS — M25.612 DECREASED ROM OF LEFT SHOULDER: Primary | ICD-10-CM

## 2020-08-05 DIAGNOSIS — Z74.09 DECREASED STRENGTH, ENDURANCE, AND MOBILITY: Primary | ICD-10-CM

## 2020-08-05 DIAGNOSIS — R53.1 DECREASED STRENGTH, ENDURANCE, AND MOBILITY: Primary | ICD-10-CM

## 2020-08-05 DIAGNOSIS — R68.89 DECREASED STRENGTH, ENDURANCE, AND MOBILITY: Primary | ICD-10-CM

## 2020-08-05 DIAGNOSIS — R26.9 GAIT ABNORMALITY: ICD-10-CM

## 2020-08-05 DIAGNOSIS — Z78.9 ALTERATION IN INSTRUMENTAL ACTIVITIES OF DAILY LIVING (IADL): ICD-10-CM

## 2020-08-05 DIAGNOSIS — M25.611 DECREASED ROM OF RIGHT SHOULDER: ICD-10-CM

## 2020-08-05 DIAGNOSIS — R29.898 DECREASED GRIP STRENGTH: ICD-10-CM

## 2020-08-05 DIAGNOSIS — R26.89 BALANCE PROBLEM: ICD-10-CM

## 2020-08-05 PROCEDURE — 97110 THERAPEUTIC EXERCISES: CPT | Mod: PN

## 2020-08-05 NOTE — PROGRESS NOTES
"    Physical Therapy Daily Treatment Note     Name: Bj Pate Jr.  Clinic Number: [id number]    Therapy Diagnosis:   Encounter Diagnoses   Name Primary?    Decreased strength, endurance, and mobility Yes    Gait abnormality     Balance problem      Physician: Lombard, Azikiwe K., MD    Visit Date: 8/5/2020    Physician Orders: PT Eval and Treat      Medical Diagnosis from Referral:   M1A.09X0 (ICD-10-CM) - Idiopathic chronic gout of multiple sites without tophus   R53.81 (ICD-10-CM) - Physical debility      Evaluation Date: 6/9/2020  Authorization Period Expiration: 6/10/2020  Plan of Care Expiration: 9/9/2020  Visit # / Visits authorized: 11/ 12                            PN due: 8/22/2020     Time In: 3:00 pm   Time Out: 3:45 pm  Total Billable Time: 45 minutes     Precautions: Standard and Diabetes      Subjective     Pt reports: he is doing well today. Challenged last session with matrix machines    He was compliant with home exercise program.  Response to previous treatment: ongoing  Functional change: ongoing    Pain: 5/10  Location: bilateral shoulder     Pts goals: "I would like to regain my strength, use of my limbs"   Objective     Bj received therapeutic exercises to develop strength for 45 minutes including:    NuStep x 5' Level 2 hills mode   Standing hip 4 ways No T-band 2x10    Mini squat 2x10    Sit<>Stand x 30 18" + Ball Lifts NP  Standing Marches  2x10  side steps inside parallel bar 3 laps  STep up and down 4 in box with B UE support   Matrix rows 40# 3x10  Matrix leg extension 40# 2x15  Matrix hamstring curls 45# 2x10  Matrix hip abd 45# 2x 10  Matrix hip add 45# 2x 10  Matrix leg press 45# 2x10  Standing heel raises 1x10   +Seated PF against GTB 2x10      NP:  Pulley Flex x 3'  Pulley PoS x 3'  Standing Rows GTB x 30  Standing Extension GTB x 30  No-Money (Seated) RTB x 30  Standing Palloff Press x 30      Bj participated in neuromuscular re-education activities to improve: Balance " for 00 minutes. The following activities were included:  Static Standing-Feet Together 2x1' NP  Semi-Tandem Standing 2x1' NP  Semi-Tandem Weight Shifting 2 x 30 NP  +Static Standing on AirEx Feet Together x3' NP  +Static Standing on AirEx Feet Apart x 3' NP  +Static Standing on BOSU Feet Together x 3' NP  +Static Standing on BOSU Feet Apart x 3' NP      Home Exercises Provided and Patient Education Provided     Education provided:   Cont to perform HEP as provided.     Written Home Exercises Provided: Patient instructed to cont prior HEP.  Exercises were reviewed and Bj was able to demonstrate them prior to the end of the session.  Bj demonstrated fair  understanding of the education provided.     See EMR under Patient Instructions for exercises provided 06/09/2020.    Assessment     Pt presents to therapy with FWW with noted anterior lean and dependence on walker. Pt with more difficulty this session completing hip abduction and extension with correct form on R LE. Pt completed all matrix exercises with appropriate level of muscular fatigue. Pt with decreased endurance, difficulty isolating hip extension and abduction with proper form.  Pt is motivated to participate in therapy, but limited muscular endurance continues to be a barrier to improvement. Pt would benefit from continued skilled PT services.       Bj is progressing well towards his goals.   Pt prognosis is Fair.     Pt will continue to benefit from skilled outpatient physical therapy to address the deficits listed in the problem list box on initial evaluation, provide pt/family education and to maximize pt's level of independence in the home and community environment.     Pt's spiritual, cultural and educational needs considered and pt agreeable to plan of care and goals.    Anticipated barriers to physical therapy: none    Goals:   Short Term Goals: 4 weeks   1. Pt to improve UE strength by > / = 1/2 MMT score in order to improve tolerance  to daily activities. Goal In progress  7-  2. Pt to improve B LE strength by > 1/2 MMT score in order to improve functional indepdence. Goal in progress  7-  3. Pt to demonstrate improvement in gait speed to > / = m/s in order to demonstrate improved community ambulation Goal in progress 7-.  4. Pt to improve condition 4 of MSTCIB to > / = 20 seconds in order to improve safety.goal in progress 7-  5. Pt to ambulate x 100 ft without assistive device and with min A in order to demonstrate improved functional independence. Goal in progress  7-     Long Term Goals: 8 weeks   1. Pt to improve UE strength by > / = 1 MMT score in order to improve tolerance to daily activities.  2. Pt to improve B LE strength by > 1 MMT score in order to improve functional indepdence.   3. Pt to demonstrate improvement in gait speed to > / = m/s in order to demonstrate improved community ambulation.  4. Pt to improve condition 4 of MSTCIB to 30 seconds in order to improve safety.  5. Pt to ambulate x 100 ft without assistive device independently in order to demonstrate improved functional independence.    Plan      Plan of care Certification: 6/9/2020 to 9/9/2020.     Outpatient Physical Therapy 2 times weekly for 8 weeks to include the following interventions: Gait Training, Manual Therapy, Moist Heat/ Ice, Neuromuscular Re-ed, Patient Education, Therapeutic Activites and Therapeutic Exercise.     Cont skilled PT session towards PT and patient's goals.    Ligia Ibarra, PT    08/05/2020

## 2020-08-07 ENCOUNTER — CLINICAL SUPPORT (OUTPATIENT)
Dept: REHABILITATION | Facility: HOSPITAL | Age: 70
End: 2020-08-07
Attending: FAMILY MEDICINE
Payer: MEDICARE

## 2020-08-07 DIAGNOSIS — M25.611 DECREASED ROM OF RIGHT SHOULDER: ICD-10-CM

## 2020-08-07 DIAGNOSIS — R29.898 DECREASED GRIP STRENGTH: ICD-10-CM

## 2020-08-07 DIAGNOSIS — Z78.9 ALTERATION IN INSTRUMENTAL ACTIVITIES OF DAILY LIVING (IADL): ICD-10-CM

## 2020-08-07 DIAGNOSIS — M25.612 DECREASED ROM OF LEFT SHOULDER: Primary | ICD-10-CM

## 2020-08-07 PROCEDURE — 97110 THERAPEUTIC EXERCISES: CPT | Mod: PN

## 2020-08-07 NOTE — PROGRESS NOTES
"  Occupational Therapy Treatment Note     Date: 8/7/2020  Name: Bj Pate Jr.  Clinic Number: [id number]    Therapy Diagnosis:   Encounter Diagnoses   Name Primary?    Decreased ROM of left shoulder Yes    Decreased ROM of right shoulder     Alteration in instrumental activities of daily living (IADL)     Decreased  strength      Physician: Lombard, Azikiwe K., MD    Physician Orders: OT Eval and treat  Medical Diagnosis: CVA  Evaluation Date: 7/24/2020  Plan of Care Expiration Period: 10/2/20  Insurance Authorization period Expiration: 8/14/20  Date of Return to MD: TBD  Visit # / Visits Authorized: 2 / 6  FOTO: 5th visit     Time In:2:30pm  Time Out: 3:15pm  Total Billable (one on one) Time: 45 minutes     Precautions: Standard and Fall    Subjective     Pt reports: "I can feel my shoulders waking up"  He was compliant with HEP  Response to previous treatment: positive   Functional change: n/a    Pain: 0/10  Location: n/a    Objective       Bj received therapeutic exercises for 45 minutes including:  - UBE x5 min forward x 5min backward res 2.5 for increased UE endurance and strength  - Supine FF 5# 2x25  - Supine chest press with serratus punch up 2x25 5#  - Matrix Seated Rows 2x25 40#  - Sidelying ER 2x25 B UEs no weight      Home Exercises and Education Provided     Education provided:   -  HEP  - Progress towards goals     Written Home Exercises Provided: yes.  Exercises were reviewed and Bj was able to demonstrate them prior to the end of the session.  Bj demonstrated good  understanding of the HEP provided.   .   See EMR under Patient Instructions for exercises provided 8/5/20.        Assessment     Bj had good tolerance to tx this date. B/L pec are tight causing rounded shoulder and limiting ROM. ERs very weak as well. Strengthening exercises were a good challenge for him. He continues with b/l weakness affecting his ability to complete ADL/IADLs.      Bj is progressing well " towards his goals and there are no updates to goals at this time. Pt prognosis is Good.     Pt will continue to benefit from skilled outpatient occupational therapy to address the deficits listed in the problem list on initial evaluation provide pt/family education and to maximize pt's level of independence in the home and community environment.     Anticipated barriers to occupational therapy: None    Pt's spiritual, cultural and educational needs considered and pt agreeable to plan of care and goals.    Goals:  Short Term Goals: 5 weeks   - Pt. Will be educated in HEP   - Pt. Will have improved b/l shoulder AROM in FF, Abd, and ext if at least 10 degrees for easier dressing and bathing  - Pt. Will report no greater than 4/10 pain over 4 consecutive tx sessions  - Pt. Will have improved b/l  strength of at least 5# for increased functional hand use  - Pt. Will complete fine motor and pinch testing and goals will be established if appropriate     Long Term Goals: 10 weeks   - Pt. Will be independent with HEP  - Pt. Will have improve L AROM ER of at least 10 degrees  - Pt. Will have improved b/l shoulder AROM in FF, Abd, and ext if at least 15 degrees for easier dressing and bathing  - Pt. Will report improved hand function for home care tasks  - Pt. Will have improved 9 HPT of no greater than 50 sec for improved ability to complete buttoning         Plan   Certification Period/Plan of care expiration: 7/24/2020 to 10/2/20.     Outpatient Occupational Therapy 2 times weekly for 10 weeks to include the following interventions: Manual Therapy, Moist Heat/ Ice, Paraffin, Patient Education, Self Care, Therapeutic Activites and Therapeutic Exercise.       Updates/Grading for next session: as tolerated       DENICE Theodore

## 2020-08-12 ENCOUNTER — CLINICAL SUPPORT (OUTPATIENT)
Dept: REHABILITATION | Facility: HOSPITAL | Age: 70
End: 2020-08-12
Attending: FAMILY MEDICINE
Payer: MEDICARE

## 2020-08-12 DIAGNOSIS — R26.9 GAIT ABNORMALITY: ICD-10-CM

## 2020-08-12 DIAGNOSIS — R53.1 DECREASED STRENGTH, ENDURANCE, AND MOBILITY: Primary | ICD-10-CM

## 2020-08-12 DIAGNOSIS — M25.611 DECREASED ROM OF RIGHT SHOULDER: ICD-10-CM

## 2020-08-12 DIAGNOSIS — R68.89 DECREASED STRENGTH, ENDURANCE, AND MOBILITY: Primary | ICD-10-CM

## 2020-08-12 DIAGNOSIS — Z74.09 DECREASED STRENGTH, ENDURANCE, AND MOBILITY: Primary | ICD-10-CM

## 2020-08-12 DIAGNOSIS — M25.612 DECREASED ROM OF LEFT SHOULDER: Primary | ICD-10-CM

## 2020-08-12 DIAGNOSIS — R26.89 BALANCE PROBLEM: ICD-10-CM

## 2020-08-12 DIAGNOSIS — Z78.9 ALTERATION IN INSTRUMENTAL ACTIVITIES OF DAILY LIVING (IADL): ICD-10-CM

## 2020-08-12 DIAGNOSIS — R29.898 DECREASED GRIP STRENGTH: ICD-10-CM

## 2020-08-12 PROCEDURE — 97110 THERAPEUTIC EXERCISES: CPT | Mod: PN

## 2020-08-12 PROCEDURE — 97112 NEUROMUSCULAR REEDUCATION: CPT | Mod: PN

## 2020-08-12 NOTE — PROGRESS NOTES
"  Occupational Therapy Treatment Note     Date: 8/12/2020  Name: Bj Pate Jr.  Clinic Number: [id number]    Therapy Diagnosis:   Encounter Diagnoses   Name Primary?    Decreased ROM of left shoulder Yes    Decreased ROM of right shoulder     Alteration in instrumental activities of daily living (IADL)     Decreased  strength      Physician: Lombard, Azikiwe K., MD    Physician Orders: OT Eval and treat  Medical Diagnosis: CVA  Evaluation Date: 7/24/2020  Plan of Care Expiration Period: 10/2/20  Insurance Authorization period Expiration: 8/14/20  Date of Return to MD: TBD  Visit # / Visits Authorized: 3 / 6  FOTO: 5th visit     Time In: 5:30pm  Time Out: 6:15pm  Total Billable (one on one) Time: 45 minutes     Precautions: Standard and Fall    Subjective     Pt reports: "I can feel my shoulders work"  He was compliant with HEP  Response to previous treatment: positive   Functional change: n/a    Pain: 0/10  Location: n/a    Objective       LEs have significant pitting edema 3+ R 4+ L     Bj received therapeutic exercises for 45 minutes including:  - UBE x5 min forward x 5min backward res 4.0 for increased UE endurance and strength  - Supine FF 5# 2x25  - Supine chest press with serratus punch up 2x25 5#  - side lying abd 2x25 2#  b/l  - Matrix Seated Rows 2x25 45#  - Sidelying ER 2x25 B UEs no weight      Home Exercises and Education Provided     Education provided:   -  HEP  - Progress towards goals     Written Home Exercises Provided: yes.  Exercises were reviewed and Bj was able to demonstrate them prior to the end of the session.  Bj demonstrated good  understanding of the HEP provided.   .   See EMR under Patient Instructions for exercises provided 8/5/20.        Assessment       Bj had good tolerance to tx this date. Isolated movements in gravity eliminated plane allowed for improved muscle control and reduced compensatory movement. Of note, LE edema is pretty significant but is " being managed by MD at this time with medication. Should it continue with no improvement, I will send a message to the MD. Shoulder fatigue noted which required rest breaks.  He continues with b/l weakness affecting his ability to complete ADL/IADLs.      Bj is progressing well towards his goals and there are no updates to goals at this time. Pt prognosis is Good.     Pt will continue to benefit from skilled outpatient occupational therapy to address the deficits listed in the problem list on initial evaluation provide pt/family education and to maximize pt's level of independence in the home and community environment.     Anticipated barriers to occupational therapy: None    Pt's spiritual, cultural and educational needs considered and pt agreeable to plan of care and goals.    Goals:  Short Term Goals: 5 weeks   - Pt. Will be educated in HEP   - Pt. Will have improved b/l shoulder AROM in FF, Abd, and ext if at least 10 degrees for easier dressing and bathing  - Pt. Will report no greater than 4/10 pain over 4 consecutive tx sessions  - Pt. Will have improved b/l  strength of at least 5# for increased functional hand use  - Pt. Will complete fine motor and pinch testing and goals will be established if appropriate     Long Term Goals: 10 weeks   - Pt. Will be independent with HEP  - Pt. Will have improve L AROM ER of at least 10 degrees  - Pt. Will have improved b/l shoulder AROM in FF, Abd, and ext if at least 15 degrees for easier dressing and bathing  - Pt. Will report improved hand function for home care tasks  - Pt. Will have improved 9 HPT of no greater than 50 sec for improved ability to complete buttoning         Plan   Certification Period/Plan of care expiration: 7/24/2020 to 10/2/20.     Outpatient Occupational Therapy 2 times weekly for 10 weeks to include the following interventions: Manual Therapy, Moist Heat/ Ice, Paraffin, Patient Education, Self Care, Therapeutic Activites and Therapeutic  Exercise.       Updates/Grading for next session: as tolerated       DENICE Theodore

## 2020-08-12 NOTE — PROGRESS NOTES
"    Physical Therapy Daily Treatment Note     Name: Bj Pate Jr.  Clinic Number: [id number]    Therapy Diagnosis:   Encounter Diagnoses   Name Primary?    Decreased strength, endurance, and mobility Yes    Gait abnormality     Balance problem      Physician: Lombard, Azikiwe K., MD    Visit Date: 8/12/2020    Physician Orders: PT Eval and Treat      Medical Diagnosis from Referral:   M1A.09X0 (ICD-10-CM) - Idiopathic chronic gout of multiple sites without tophus   R53.81 (ICD-10-CM) - Physical debility      Evaluation Date: 6/9/2020  Authorization Period Expiration: 6/10/2020  Plan of Care Expiration: 9/9/2020  Visit # / Visits authorized: 1/3 (11 previous referral)PN due: 8/22/2020     Time In: 3:00 pm   Time Out: 3:45 pm  Total Billable Time: 45 minutes     Precautions: Standard and Diabetes      Subjective     Pt reports: Feels he still needs to work on the strength in his legs. States he would like to eventually walk without RW, relies on it for confidence.     He was compliant with home exercise program.  Response to previous treatment: ongoing  Functional change: ongoing    Pain: 5/10  Location: bilateral shoulder     Pts goals: "I would like to regain my strength, use of my limbs"   Objective     Bj received therapeutic exercises to develop strength for 30 minutes including:    NuStep x 5' Level 5 hills mode   Standing hip 4 ways No T-band 2x10    Mini squat 2x10    Sit<>Stand x 30 18" + Ball Lifts NP  Standing Marches  2x10  side steps inside parallel bar 3 laps  Step ups 4 in step 2x10  +Lateral step ups 4 in step 2x10      NP:  Pulley Flex x 3'  Pulley PoS x 3'  Standing Rows GTB x 30  Standing Extension GTB x 30  No-Money (Seated) RTB x 30  Standing Palloff Press x 30  Matrix leg extension 40# 2x15  Matrix hamstring curls 45# 2x10  Matrix hip abd 45# 2x 10  Matrix hip add 45# 2x 10  Matrix leg press 45# 2x10  Standing heel raises 1x10   Matrix rows 40# 3x10  Seated PF against GTB " 2x10      Bj participated in neuromuscular re-education activities to improve: Balance for 15 minutes. The following activities were included:  Static Standing-Feet Together 2x1' NP  Semi-Tandem Standing 2x1' NP  Semi-Tandem Weight Shifting 2 x 30 NP  Static Standing on AirEx Feet Together 2x30  Static Standing on AirEx Feet Apart  2x30  +NBOS trunk rotation passing ball to PT x 10  +Stepping over purple noodle x 10 ea LE  Static Standing on BOSU Feet Together x 3' NP  Static Standing on BOSU Feet Apart x 3' NP      Home Exercises Provided and Patient Education Provided     Education provided:   Cont to perform HEP as provided.     Written Home Exercises Provided: Patient instructed to cont prior HEP.  Exercises were reviewed and Bj was able to demonstrate them prior to the end of the session.  Bj demonstrated fair  understanding of the education provided.     See EMR under Patient Instructions for exercises provided 06/09/2020.    Assessment     Pt presents to therapy with FWW with noted anterior lean and dependence on walker. Addition of standing step ups/ balance activities in bars this session in order to challenge pt LE strength without dependence on UE. Pt with more difficulty completing step ups ant/lateral on R LE. Pt would continue to benefit from skilled PT services with the goal of improving gait, mobility, and endurance to walking with LRAD.     Bj is progressing well towards his goals.   Pt prognosis is Fair.     Pt will continue to benefit from skilled outpatient physical therapy to address the deficits listed in the problem list box on initial evaluation, provide pt/family education and to maximize pt's level of independence in the home and community environment.     Pt's spiritual, cultural and educational needs considered and pt agreeable to plan of care and goals.    Anticipated barriers to physical therapy: none    Goals:   Short Term Goals: 4 weeks   1. Pt to improve UE strength  by > / = 1/2 MMT score in order to improve tolerance to daily activities. Goal In progress  7-  2. Pt to improve B LE strength by > 1/2 MMT score in order to improve functional indepdence. Goal in progress  7-  3. Pt to demonstrate improvement in gait speed to > / = m/s in order to demonstrate improved community ambulation Goal in progress 7-.  4. Pt to improve condition 4 of MSTCIB to > / = 20 seconds in order to improve safety.goal in progress 7-  5. Pt to ambulate x 100 ft without assistive device and with min A in order to demonstrate improved functional independence. Goal in progress  7-     Long Term Goals: 8 weeks   1. Pt to improve UE strength by > / = 1 MMT score in order to improve tolerance to daily activities.  2. Pt to improve B LE strength by > 1 MMT score in order to improve functional indepdence.   3. Pt to demonstrate improvement in gait speed to > / = m/s in order to demonstrate improved community ambulation.  4. Pt to improve condition 4 of MSTCIB to 30 seconds in order to improve safety.  5. Pt to ambulate x 100 ft without assistive device independently in order to demonstrate improved functional independence.    Plan      Plan of care Certification: 6/9/2020 to 9/9/2020.     Outpatient Physical Therapy 2 times weekly for 8 weeks to include the following interventions: Gait Training, Manual Therapy, Moist Heat/ Ice, Neuromuscular Re-ed, Patient Education, Therapeutic Activites and Therapeutic Exercise.     Cont skilled PT session towards PT and patient's goals.    Ligia Ibarra, PT    08/12/2020

## 2020-08-14 ENCOUNTER — CLINICAL SUPPORT (OUTPATIENT)
Dept: REHABILITATION | Facility: HOSPITAL | Age: 70
End: 2020-08-14
Attending: FAMILY MEDICINE
Payer: MEDICARE

## 2020-08-14 DIAGNOSIS — R26.89 BALANCE PROBLEM: ICD-10-CM

## 2020-08-14 DIAGNOSIS — R68.89 DECREASED STRENGTH, ENDURANCE, AND MOBILITY: Primary | ICD-10-CM

## 2020-08-14 DIAGNOSIS — R53.1 DECREASED STRENGTH, ENDURANCE, AND MOBILITY: Primary | ICD-10-CM

## 2020-08-14 DIAGNOSIS — Z74.09 DECREASED STRENGTH, ENDURANCE, AND MOBILITY: Primary | ICD-10-CM

## 2020-08-14 DIAGNOSIS — M25.611 DECREASED ROM OF RIGHT SHOULDER: ICD-10-CM

## 2020-08-14 DIAGNOSIS — R29.898 DECREASED GRIP STRENGTH: ICD-10-CM

## 2020-08-14 DIAGNOSIS — R26.9 GAIT ABNORMALITY: ICD-10-CM

## 2020-08-14 DIAGNOSIS — Z78.9 ALTERATION IN INSTRUMENTAL ACTIVITIES OF DAILY LIVING (IADL): ICD-10-CM

## 2020-08-14 DIAGNOSIS — M25.612 DECREASED ROM OF LEFT SHOULDER: Primary | ICD-10-CM

## 2020-08-14 PROCEDURE — 97112 NEUROMUSCULAR REEDUCATION: CPT | Mod: PN

## 2020-08-14 PROCEDURE — 97110 THERAPEUTIC EXERCISES: CPT | Mod: PN

## 2020-08-14 PROCEDURE — 97530 THERAPEUTIC ACTIVITIES: CPT | Mod: PN

## 2020-08-14 NOTE — PROGRESS NOTES
"  Physical Therapy Daily Treatment Note     Name: Bj Pate Jr.  Clinic Number: [id number]    Therapy Diagnosis:   Encounter Diagnoses   Name Primary?    Decreased strength, endurance, and mobility Yes    Gait abnormality     Balance problem      Physician: Lombard, Azikiwe K., MD    Visit Date: 8/14/2020    Physician Orders: PT Eval and Treat      Medical Diagnosis from Referral:   M1A.09X0 (ICD-10-CM) - Idiopathic chronic gout of multiple sites without tophus   R53.81 (ICD-10-CM) - Physical debility      Evaluation Date: 6/9/2020  Authorization Period Expiration: 6/10/2020  Plan of Care Expiration: 9/9/2020  Visit # / Visits authorized: 1/3 (11 previous referral)PN due: 8/22/2020     Time In: 3:00 pm   Time Out: 3:45 pm  Total Billable Time: 45 minutes     Precautions: Standard and Diabetes      Subjective     Pt reports: Feeling well today, no changes since last session.     He was compliant with home exercise program.  Response to previous treatment: ongoing  Functional change: ongoing    Pain: 5/10  Location: bilateral shoulder     Pts goals: "I would like to regain my strength, use of my limbs"   Objective     Bj received therapeutic exercises to develop strength for 30 minutes including:    NuStep x 5' Level 5 hills mode   Standing hip 4 ways No T-band 2x10    Mini squat 2x10    Sit<>Stand x 30 18" + Ball Lifts NP  Standing Marches  2x10  side steps inside parallel bar YTB 3 laps  Step ups 4 in step 2x10  Lateral step ups 4 in step 2x10      NP:  Pulley Flex x 3'  Pulley PoS x 3'  Standing Rows GTB x 30  Standing Extension GTB x 30  No-Money (Seated) RTB x 30  Standing Palloff Press x 30  Matrix leg extension 40# 2x15  Matrix hamstring curls 45# 2x10  Matrix hip abd 45# 2x 10  Matrix hip add 45# 2x 10  Matrix leg press 45# 2x10  Standing heel raises 1x10   Matrix rows 40# 3x10  Seated PF against GTB 2x10      Bj participated in neuromuscular re-education activities to improve: Balance for 15 " minutes. The following activities were included:  Static Standing-Feet Together 2x1' NP  Semi-Tandem Standing 2x1' NP  Semi-Tandem Weight Shifting 2 x 30 NP  Static Standing on AirEx Feet Together 2x30  Static Standing on AirEx Feet Apart  2x30  NBOS trunk rotation passing ball to PT x 10  NP - Stepping over purple noodle x 10 ea LE  Static Standing on BOSU Feet Together x 3' NP  Static Standing on BOSU Feet Apart x 3' NP      Home Exercises Provided and Patient Education Provided     Education provided:   Cont to perform HEP as provided.     Written Home Exercises Provided: Patient instructed to cont prior HEP.  Exercises were reviewed and Bj was able to demonstrate them prior to the end of the session.  Bj demonstrated fair  understanding of the education provided.     See EMR under Patient Instructions for exercises provided 06/09/2020.    Assessment     Pt presents to therapy with FWW with noted anterior lean and dependence on walker. Pt continues with limited endurance during session, requiring multiple rest breaks throughout. VC required this session to reduce compensation during lateral walking. Pt remains appropriate for therapy at this time.     Bj is progressing well towards his goals.   Pt prognosis is Fair.     Pt will continue to benefit from skilled outpatient physical therapy to address the deficits listed in the problem list box on initial evaluation, provide pt/family education and to maximize pt's level of independence in the home and community environment.     Pt's spiritual, cultural and educational needs considered and pt agreeable to plan of care and goals.    Anticipated barriers to physical therapy: none    Goals:   Short Term Goals: 4 weeks   1. Pt to improve UE strength by > / = 1/2 MMT score in order to improve tolerance to daily activities. Goal In progress  7-  2. Pt to improve B LE strength by > 1/2 MMT score in order to improve functional indepdence. Goal in progress   7-  3. Pt to demonstrate improvement in gait speed to > / = m/s in order to demonstrate improved community ambulation Goal in progress 7-.  4. Pt to improve condition 4 of MSTCIB to > / = 20 seconds in order to improve safety.goal in progress 7-  5. Pt to ambulate x 100 ft without assistive device and with min A in order to demonstrate improved functional independence. Goal in progress  7-     Long Term Goals: 8 weeks   1. Pt to improve UE strength by > / = 1 MMT score in order to improve tolerance to daily activities.  2. Pt to improve B LE strength by > 1 MMT score in order to improve functional indepdence.   3. Pt to demonstrate improvement in gait speed to > / = m/s in order to demonstrate improved community ambulation.  4. Pt to improve condition 4 of MSTCIB to 30 seconds in order to improve safety.  5. Pt to ambulate x 100 ft without assistive device independently in order to demonstrate improved functional independence.    Plan      Plan of care Certification: 6/9/2020 to 9/9/2020.     Outpatient Physical Therapy 2 times weekly for 8 weeks to include the following interventions: Gait Training, Manual Therapy, Moist Heat/ Ice, Neuromuscular Re-ed, Patient Education, Therapeutic Activites and Therapeutic Exercise.     Cont skilled PT session towards PT and patient's goals.    Ligia Ibarra, PT    08/17/2020

## 2020-08-14 NOTE — PROGRESS NOTES
"  Occupational Therapy Treatment Note     Date: 8/14/2020  Name: Bj Pate Jr.  Clinic Number: [id number]    Therapy Diagnosis:   Encounter Diagnoses   Name Primary?    Decreased ROM of left shoulder Yes    Decreased ROM of right shoulder     Alteration in instrumental activities of daily living (IADL)     Decreased  strength      Physician: Lombard, Azikiwe K., MD    Physician Orders: OT Eval and treat  Medical Diagnosis: CVA  Evaluation Date: 7/24/2020  Plan of Care Expiration Period: 10/2/20  Insurance Authorization period Expiration: 8/14/20  Date of Return to MD: TBD  Visit # / Visits Authorized: 4 / 6  FOTO: 5th visit     Time In: 1:00pm  Time Out: 1:45pm  Total Billable (one on one) Time: 45 minutes     Precautions: Standard and Fall    Subjective     Pt reports: "I can feel myself working"  He was compliant with HEP  Response to previous treatment: positive   Functional change: n/a    Pain: 0/10  Location: n/a    Objective       Bj received therapeutic exercises for 30 minutes including: increased time needed due to fatigue   - UBE x5 min forward x 5min backward res 4.5 for increased UE endurance and strength  - ergo gripper spring on 3rd notch 2x30 B/l  - digit ext red band 2x30 b/l    Bj participated in therapeutic activities for 15 minutes:  - Fine motor manipulation with wooden peg board and coins, focus on tip pinch and palm to finger translation    - peg board x2 trials each hand   - coin  and place x1 trial each hand      Home Exercises and Education Provided     Education provided:   -  HEP  - Progress towards goals     Written Home Exercises Provided: yes.  Exercises were reviewed and Bj was able to demonstrate them prior to the end of the session.  Bj demonstrated good  understanding of the HEP provided.   .   See EMR under Patient Instructions for exercises provided 8/5/20.        Assessment     Bj had good tolerance to fine motor and hand strengthening. " Significant increase in time needed due to hand fatigue and multiple drops. Edema in LEs still noted.  He continues with b/l weakness affecting his ability to complete ADL/IADLs.      Bj is progressing well towards his goals and there are no updates to goals at this time. Pt prognosis is Good.     Pt will continue to benefit from skilled outpatient occupational therapy to address the deficits listed in the problem list on initial evaluation provide pt/family education and to maximize pt's level of independence in the home and community environment.     Anticipated barriers to occupational therapy: None    Pt's spiritual, cultural and educational needs considered and pt agreeable to plan of care and goals.    Goals:  Short Term Goals: 5 weeks   - Pt. Will be educated in HEP   - Pt. Will have improved b/l shoulder AROM in FF, Abd, and ext if at least 10 degrees for easier dressing and bathing  - Pt. Will report no greater than 4/10 pain over 4 consecutive tx sessions  - Pt. Will have improved b/l  strength of at least 5# for increased functional hand use  - Pt. Will complete fine motor and pinch testing and goals will be established if appropriate MET     Long Term Goals: 10 weeks   - Pt. Will be independent with HEP  - Pt. Will have improve L AROM ER of at least 10 degrees  - Pt. Will have improved b/l shoulder AROM in FF, Abd, and ext if at least 15 degrees for easier dressing and bathing  - Pt. Will report improved hand function for home care tasks  - Pt. Will have improved 9 HPT of no greater than 50 sec for improved ability to complete buttoning         Plan   Certification Period/Plan of care expiration: 7/24/2020 to 10/2/20.     Outpatient Occupational Therapy 2 times weekly for 10 weeks to include the following interventions: Manual Therapy, Moist Heat/ Ice, Paraffin, Patient Education, Self Care, Therapeutic Activites and Therapeutic Exercise.       Updates/Grading for next session: as tolerated        DENICE Theodore

## 2020-08-18 ENCOUNTER — CLINICAL SUPPORT (OUTPATIENT)
Dept: REHABILITATION | Facility: HOSPITAL | Age: 70
End: 2020-08-18
Attending: FAMILY MEDICINE
Payer: MEDICARE

## 2020-08-18 DIAGNOSIS — R53.1 DECREASED STRENGTH, ENDURANCE, AND MOBILITY: Primary | ICD-10-CM

## 2020-08-18 DIAGNOSIS — R26.89 BALANCE PROBLEM: ICD-10-CM

## 2020-08-18 DIAGNOSIS — R68.89 DECREASED STRENGTH, ENDURANCE, AND MOBILITY: Primary | ICD-10-CM

## 2020-08-18 DIAGNOSIS — Z74.09 DECREASED STRENGTH, ENDURANCE, AND MOBILITY: Primary | ICD-10-CM

## 2020-08-18 DIAGNOSIS — R26.9 GAIT ABNORMALITY: ICD-10-CM

## 2020-08-18 PROCEDURE — 97110 THERAPEUTIC EXERCISES: CPT | Mod: PN

## 2020-08-18 NOTE — PROGRESS NOTES
"  Physical Therapy Daily Treatment Note     Name: Bj Pate Jr.  Clinic Number: [id number]    Therapy Diagnosis:   Encounter Diagnoses   Name Primary?    Decreased strength, endurance, and mobility Yes    Gait abnormality     Balance problem      Physician: Lombard, Azikiwe K., MD    Visit Date: 8/18/2020    Physician Orders: PT Eval and Treat      Medical Diagnosis from Referral:   M1A.09X0 (ICD-10-CM) - Idiopathic chronic gout of multiple sites without tophus   R53.81 (ICD-10-CM) - Physical debility      Evaluation Date: 6/9/2020  Authorization Period Expiration: 6/10/2020  Plan of Care Expiration: 9/9/2020  Visit # / Visits authorized: 2/3 (11 previous referral)      PN due: 8/22/2020     Time In: 2:20 pm   Time Out: 3:05 pm  Total Billable Time: 45 minutes     Precautions: Standard and Diabetes    Subjective     Pt reports: Was sore this weekend following PT/OT on Friday.     He was compliant with home exercise program.  Response to previous treatment: ongoing  Functional change: ongoing    Pain: 5/10  Location: bilateral shoulder     Pts goals: "I would like to regain my strength, use of my limbs"   Objective     Bj received therapeutic exercises to develop strength for 45 minutes including:    NuStep x 5' Level 6.5 hills mode   Standing hip 4 ways No T-band 2x10    Mini squat 2x10    Sit<>Stand x 30 18" + Ball Lifts NP  Standing Marches  2x10  side steps inside parallel bar YTB 3 laps  Step ups 4 in step 2x10  Lateral step ups 4 in step 2x10  +Toe taps to 8 in c/ one UE support 2x10    Matrix hip abd 45# 2x 10  Matrix hip add 45# 2x 10  Matrix leg press 50# 2x10    NP:  Pulley Flex x 3'  Pulley PoS x 3'  Standing Rows GTB x 30  Standing Extension GTB x 30  No-Money (Seated) RTB x 30  Standing Palloff Press x 30  Matrix leg extension 40# 2x15  Matrix hamstring curls 45# 2x10    Standing heel raises 1x10   Matrix rows 40# 3x10  Seated PF against GTB 2x10      Bj participated in neuromuscular " re-education activities to improve: Balance for 00 minutes. The following activities were included:    Static Standing-Feet Together 2x1' NP  Semi-Tandem Standing 2x1' NP  Semi-Tandem Weight Shifting 2 x 30 NP  Static Standing on AirEx Feet Together 2x30  Static Standing on AirEx Feet Apart  2x30  NBOS trunk rotation passing ball to PT x 10  NP - Stepping over purple noodle x 10 ea LE  Static Standing on BOSU Feet Together x 3' NP  Static Standing on BOSU Feet Apart x 3' NP      Home Exercises Provided and Patient Education Provided     Education provided:   Cont to perform HEP as provided.     Written Home Exercises Provided: Patient instructed to cont prior HEP.  Exercises were reviewed and Bj was able to demonstrate them prior to the end of the session.  Bj demonstrated fair  understanding of the education provided.     See EMR under Patient Instructions for exercises provided 06/09/2020.    Assessment     Pt tolerated session well overall with no adverse response. Addition of toe taps this session to challenge SLS and B hip girdle musculature. Pt performed with one UE support, limited control of R hip flexor eccentric descent. Pt increased resistance on leg press this session. Pt remains appropriate for therapy at this time.     Bj is progressing well towards his goals.   Pt prognosis is Fair.     Pt will continue to benefit from skilled outpatient physical therapy to address the deficits listed in the problem list box on initial evaluation, provide pt/family education and to maximize pt's level of independence in the home and community environment.     Pt's spiritual, cultural and educational needs considered and pt agreeable to plan of care and goals.    Anticipated barriers to physical therapy: none    Goals:   Short Term Goals: 4 weeks   1. Pt to improve UE strength by > / = 1/2 MMT score in order to improve tolerance to daily activities. Goal In progress  7-  2. Pt to improve B LE  strength by > 1/2 MMT score in order to improve functional indepdence. Goal in progress  7-  3. Pt to demonstrate improvement in gait speed to > / = m/s in order to demonstrate improved community ambulation Goal in progress 7-.  4. Pt to improve condition 4 of MSTCIB to > / = 20 seconds in order to improve safety.goal in progress 7-  5. Pt to ambulate x 100 ft without assistive device and with min A in order to demonstrate improved functional independence. Goal in progress  7-     Long Term Goals: 8 weeks   1. Pt to improve UE strength by > / = 1 MMT score in order to improve tolerance to daily activities.  2. Pt to improve B LE strength by > 1 MMT score in order to improve functional indepdence.   3. Pt to demonstrate improvement in gait speed to > / = m/s in order to demonstrate improved community ambulation.  4. Pt to improve condition 4 of MSTCIB to 30 seconds in order to improve safety.  5. Pt to ambulate x 100 ft without assistive device independently in order to demonstrate improved functional independence.    Plan      Plan of care Certification: 6/9/2020 to 9/9/2020.     Outpatient Physical Therapy 2 times weekly for 8 weeks to include the following interventions: Gait Training, Manual Therapy, Moist Heat/ Ice, Neuromuscular Re-ed, Patient Education, Therapeutic Activites and Therapeutic Exercise.     Cont skilled PT session towards PT and patient's goals.    Ligia Ibarra, PT    08/18/2020

## 2020-08-21 ENCOUNTER — CLINICAL SUPPORT (OUTPATIENT)
Dept: REHABILITATION | Facility: HOSPITAL | Age: 70
End: 2020-08-21
Attending: FAMILY MEDICINE
Payer: MEDICARE

## 2020-08-21 DIAGNOSIS — R68.89 DECREASED STRENGTH, ENDURANCE, AND MOBILITY: Primary | ICD-10-CM

## 2020-08-21 DIAGNOSIS — M25.612 DECREASED ROM OF LEFT SHOULDER: Primary | ICD-10-CM

## 2020-08-21 DIAGNOSIS — Z78.9 ALTERATION IN INSTRUMENTAL ACTIVITIES OF DAILY LIVING (IADL): ICD-10-CM

## 2020-08-21 DIAGNOSIS — M25.611 DECREASED ROM OF RIGHT SHOULDER: ICD-10-CM

## 2020-08-21 DIAGNOSIS — Z74.09 DECREASED STRENGTH, ENDURANCE, AND MOBILITY: Primary | ICD-10-CM

## 2020-08-21 DIAGNOSIS — R26.9 GAIT ABNORMALITY: ICD-10-CM

## 2020-08-21 DIAGNOSIS — R53.1 DECREASED STRENGTH, ENDURANCE, AND MOBILITY: Primary | ICD-10-CM

## 2020-08-21 DIAGNOSIS — R26.89 BALANCE PROBLEM: ICD-10-CM

## 2020-08-21 DIAGNOSIS — R29.898 DECREASED GRIP STRENGTH: ICD-10-CM

## 2020-08-21 DIAGNOSIS — E11.9 TYPE 2 DIABETES MELLITUS WITHOUT COMPLICATION: ICD-10-CM

## 2020-08-21 PROCEDURE — 97112 NEUROMUSCULAR REEDUCATION: CPT | Mod: PN

## 2020-08-21 PROCEDURE — 97116 GAIT TRAINING THERAPY: CPT | Mod: PN

## 2020-08-21 PROCEDURE — 97110 THERAPEUTIC EXERCISES: CPT | Mod: PN

## 2020-08-21 NOTE — PROGRESS NOTES
"  Physical Therapy Daily Treatment Note     Name: Bj Pate Jr.  Clinic Number: [id number]    Therapy Diagnosis:   Encounter Diagnoses   Name Primary?    Decreased strength, endurance, and mobility Yes    Gait abnormality     Balance problem      Physician: Lombard, Azikiwe K., MD    Visit Date: 8/21/2020    Physician Orders: PT Eval and Treat      Medical Diagnosis from Referral:   M1A.09X0 (ICD-10-CM) - Idiopathic chronic gout of multiple sites without tophus   R53.81 (ICD-10-CM) - Physical debility      Evaluation Date: 6/9/2020  Authorization Period Expiration: 6/10/2020  Plan of Care Expiration: 9/9/2020  Visit # / Visits authorized: 3/3 (11 previous referral)               PN due: 8/22/2020     Time In: 3:50 pm   Time Out: 4:40 pm  Total Billable Time: 50 minutes     Precautions: Standard and Diabetes    Subjective     Pt reports: Feeling good today. States he does not use walker at home, no falls.     He was compliant with home exercise program.  Response to previous treatment: ongoing  Functional change: ongoing    Pain: 5/10  Location: bilateral shoulder     Pts goals: "I would like to regain my strength, use of my limbs"   Objective     Bj received therapeutic exercises to develop strength for 25 minutes including:    NuStep x 5' Level 5 hills mode   Standing hip 4 ways No T-band 2x10   Mini squat 2x10    Standing Marches  2x10  Matrix leg press 50# 2x10    NP:  Pulley Flex x 3'  Pulley PoS x 3'  Standing Rows GTB x 30  Standing Extension GTB x 30  No-Money (Seated) RTB x 30  Standing Palloff Press x 30  Matrix leg extension 40# 2x15  Matrix hamstring curls 45# 2x10  Standing heel raises 1x10   Matrix rows 40# 3x10  Seated PF against GTB 2x10  Toe taps to 8 in c/ one UE support 2x10  Matrix hip abd 45# 2x 10  Matrix hip add 45# 2x 10   Step ups 4 in step 2x10  Lateral step ups 4 in step 2x10  Sit<>Stand x 30 18" + Ball Lifts   side steps inside parallel bar YTB 3 laps    Bj participated in " neuromuscular re-education activities to improve: Balance for 10 minutes. The following activities were included:    Static Standing-Feet Together 2x1'   Semi-Tandem Standing 2x1' NP  Semi-Tandem Weight Shifting 2 x 30 NP  Static Standing on AirEx Feet Together 2x30  Static Standing on AirEx Feet Apart  2x30  +Step ups to foam 2x10 B LE  NBOS trunk rotation passing ball to PT x 10  NP - Stepping over purple noodle x 10 ea LE  Static Standing on BOSU Feet Together x 3' NP  Static Standing on BOSU Feet Apart x 3' NP    Bj participated in gait training to improve functional mobility and safety for 15  minutes, including:  +Gait training in // bars with single point cane x 4 laps  +Gait training in // bars with small base quad cane x 4 laps  +50 ft with SBQC and S- vc for sequencing and placement of cane   +90 ft with SBQC and S    Home Exercises Provided and Patient Education Provided     Education provided:   Cont to perform HEP as provided.     Written Home Exercises Provided: Patient instructed to cont prior HEP.  Exercises were reviewed and Bj was able to demonstrate them prior to the end of the session.  Bj demonstrated fair  understanding of the education provided.     See EMR under Patient Instructions for exercises provided 06/09/2020.    Assessment     Pt tolerated session well overall with no adverse response. Trialed single point and small base quad cane this session. Pt appeared to ambulate more confidently and safely with small base quad cane. Pt able to safely return demonstration with S from therapist and vc for correct sequencing, placement of cane. Plan to continue training for endurance using quad cane and trial outdoor use as appropriate. Pt continues with decreased endurance, decreased LE strength, gait abnormalities, and poor tolerance to activities. Pt remains appropriate for therapy at this time.      Bj is progressing well towards his goals.   Pt prognosis is Good.     Pt will  continue to benefit from skilled outpatient physical therapy to address the deficits listed in the problem list box on initial evaluation, provide pt/family education and to maximize pt's level of independence in the home and community environment.     Pt's spiritual, cultural and educational needs considered and pt agreeable to plan of care and goals.    Anticipated barriers to physical therapy: none    Goals:   Short Term Goals: 4 weeks   1. Pt to improve UE strength by > / = 1/2 MMT score in order to improve tolerance to daily activities. Goal In progress  7-  2. Pt to improve B LE strength by > 1/2 MMT score in order to improve functional indepdence. Goal in progress  7-  3. Pt to demonstrate improvement in gait speed to > / = m/s in order to demonstrate improved community ambulation Goal in progress 7-.  4. Pt to improve condition 4 of MSTCIB to > / = 20 seconds in order to improve safety.goal in progress 7-  5. Pt to ambulate x 100 ft without assistive device and with min A in order to demonstrate improved functional independence. Goal in progress  7-     Long Term Goals: 8 weeks   1. Pt to improve UE strength by > / = 1 MMT score in order to improve tolerance to daily activities.  2. Pt to improve B LE strength by > 1 MMT score in order to improve functional indepdence.   3. Pt to demonstrate improvement in gait speed to > / = m/s in order to demonstrate improved community ambulation.  4. Pt to improve condition 4 of MSTCIB to 30 seconds in order to improve safety.  5. Pt to ambulate x 100 ft without assistive device independently in order to demonstrate improved functional independence.    Plan      Plan of care Certification: 6/9/2020 to 9/9/2020.     Outpatient Physical Therapy 2 times weekly for 8 weeks to include the following interventions: Gait Training, Manual Therapy, Moist Heat/ Ice, Neuromuscular Re-ed, Patient Education, Therapeutic Activites and Therapeutic  Exercise.     Cont skilled PT session towards PT and patient's goals.    Ligia Ibarra, PT    08/21/2020

## 2020-08-21 NOTE — PROGRESS NOTES
"  Occupational Therapy Treatment Note     Date: 8/21/2020  Name: Bj Pate Jr.  Clinic Number: [id number]    Therapy Diagnosis:   Encounter Diagnoses   Name Primary?    Decreased ROM of left shoulder Yes    Decreased ROM of right shoulder     Alteration in instrumental activities of daily living (IADL)     Decreased  strength      Physician: Lombard, Azikiwe K., MD    Physician Orders: OT Eval and treat  Medical Diagnosis: CVA  Evaluation Date: 7/24/2020  Plan of Care Expiration Period: 10/2/20  Insurance Authorization period Expiration: 8/14/20  Date of Return to MD: TBD  Visit # / Visits Authorized: 5 / 6  FOTO: 5th visit     Time In: 1:45pm  Time Out: 2:30pm  Total Billable (one on one) Time: 45 minutes     Precautions: Standard and Fall    Subjective     Pt reports: "I have been working at home"   He was compliant with HEP  Response to previous treatment: positive   Functional change: n/a    Pain: 0/10  Location: n/a    Objective       Bj received therapeutic exercises for 45 minutes including: increased time needed due to fatigue   Cognitive Exam:  Oriented: Person, Place, Time and Situation  Behaviors: normal, cooperative  Follows Commands/attention: Follows multistep  commands  Communication: clear/fluent  Memory: No Deficits noted as determined by 3 word recall after 1 minute and 3 minutes  Safety awareness/insight to disability: aware of diagnosis, treatment, and prognosis  Coping skills/emotional control: Appropriate to situation     Visual/Perceptual:     Acuity: corrected by glasses  Convergence: NT  Nystagmus: NT   R/L discrimination: intact  Visual field: intact  Motor Planning Praxis: intact    Comments: n/a     Physical Exam:  Postural examination/scapula alignment: Rounded shoulder and Head forward  Joint integrity: Firm end feeling  Skin integrity: warm/dry  Edema: none noted   Palpation: NT        Joint Evaluation  AROM  7/24/2020 PROM   7/24/2020 AROM  7/24/2020 PROM   7/24/2020 " AROM  8/21/20 PROM  8/21 AROM  8/21 PROM  8/21     Left Left Right Right Left Left Right Right   Shoulder flex 0-180 85 175 110 175 115 175 135 180   Shoulder Abd 0-180 70 160 130 WNL 80  WNL   Shoulder ER 0-90 20 75 50 WNL 50 75 50 WNL   Shoulder IR 0-90 Small of back WNL Small of back WNL Small of back WNL Small of back WNL   Shoulder Extension 0-80 60 70 50 80 60 WNL 55 WNL   Shoulder Horizontal adduction 0-90 WFL WNL WFL WNL WFL WNL WFL WNL   Elbow flex/ext 0-150 5/WNL 5/WNL 10/WNL 5/WNL 5/WNL 5/WNL 10/WNL 5/WNL   Wrist flex 0-80 45 60 40 50 45 WNL 40 WNL   Wrist ext 0-70 50 65 60 75 45 WNL 60 WNL   Supination 0-80 WFL WNL 50 WNL WFL WNL WFL WNL   Pronation 0-80 WNL WNL WNL wNL WNL WNL WNL WNL      Fist: normal        Strength 7/24/2020 7/24/2020 8/21/20 8/21/20   **within available ROM** Left Right Left Right   Shoulder flex 3/5 3/5 3+ 3+/5   Shoulder abd 3/5 3/5 3+ 3+/5   Shoulder ER 4/5 4/5 4/5 4/5   Shoulder IR 5/5 5/5 5/5 5/5   Shoulder Extension 5/5 5/5 5/5 5/5   Shoulder Horizontal adduction 4/5 4/5 5/5 5/5   Elbow flex 5/5 5/5 5/5 5/5   Elbow ext 5/5 5/5 5/5 5/5   Wrist flex 5/5 5/5 5/5 5/5   Wrist ext 5/5 5/5 5/5 5/5   Supination 5/5 5/5 5/5 5/5   Pronation 5/5 5/5 5/5 5/5       Strength: (YVETTE Dynamometer in lbs.) Average 3 trials, Position II:       7/24/2020 7/24/2020 8/21/20 8/21/20     Left Right Left Right   Rung II 40.7# 30.5# 41.6 44.1       Pinch Strength (Measured in psi)       7/31/20 7/31/20       Left Right Left Right   Key Pinch 15 psi 14 psi 16 14   3pt Pinch 12 psi 8 psi 11 12   2pt Pinch 11 psi 7 psi 11 9      Fine Motor Coordination: 9 Hole Peg Test  Left 7/31/20 Right 7/31/20 Left  8/21/20 Right  8/21/20   1:01.84  Several drops 1:02.56  2 drops 47.56 43.46        Gross motor coordination:   · GURPREET (Rapid Alternating Movements): WFL  · Finger to Nose (5 times): WFL  · Finger Flicks (coordination moving from digit flexion to digit extension): WFL     Tone:  Modified Anselmo  Scale:   0 - No increase in muscle tone     Comments: Noted shoulder weakness     Sensation:  Bj  reports normal sensation        Functional Status      Functional Mobility:  Bed mobility: I  Roll to left: I  Roll to right: I  Supine to sit: I  Sit to supine: I  Transfers to bed: I  Transfers to toilet: I  Car transfers: I  Wheelchair mobility: n/a     ADL's:  Feeding: Mod I  Grooming: Mod I  Hygiene: Mod I  UB Dressing: Mod I  LB Dressing: Mod I  Toileting: Mod I  Bathing: Mod I     IADL's:  Homecare: Mod I  Cooking: Mod I  Laundry: I  Yard work: n/a  Use of telephone: I  Money management: I  Medication management: I  Handwriting:I  Technology Use:I      - UBE x5 min forward x 5min backward res 4.5 for increased UE endurance and strength      Home Exercises and Education Provided     Education provided:   -  HEP  - Progress towards goals     Written Home Exercises Provided: yes.  Exercises were reviewed and Bj was able to demonstrate them prior to the end of the session.  Bj demonstrated good  understanding of the HEP provided.   .   See EMR under Patient Instructions for exercises provided 8/5/20.        Assessment     Bj had good gains in AROM of B UEs. His strength has also improved as well. His size/weight may be a limiting factor to his UE movement. Significant pitting edema still noted in B LEs. L  remains unchanged but R  with significant improvements. He continues with b/l weakness affecting his ability to complete ADL/IADLs.      Bj is progressing well towards his goals and there are no updates to goals at this time. Pt prognosis is Good.     Pt will continue to benefit from skilled outpatient occupational therapy to address the deficits listed in the problem list on initial evaluation provide pt/family education and to maximize pt's level of independence in the home and community environment.     Anticipated barriers to occupational therapy: None    Pt's spiritual, cultural  and educational needs considered and pt agreeable to plan of care and goals.    Goals:  Short Term Goals: 5 weeks   - Pt. Will be educated in HEP  MET  - Pt. Will have improved b/l shoulder AROM in FF, Abd, and ext if at least 10 degrees for easier dressing and bathing MET  - Pt. Will report no greater than 4/10 pain over 4 consecutive tx sessions  - Pt. Will have improved b/l  strength of at least 5# for increased functional hand use  - Pt. Will complete fine motor and pinch testing and goals will be established if appropriate MET     Long Term Goals: 10 weeks   - Pt. Will be independent with HEP  - Pt. Will have improve L AROM ER of at least 10 degrees MET  - Pt. Will have improved b/l shoulder AROM in FF, Abd, and ext if at least 15 degrees for easier dressing and bathing  - Pt. Will report improved hand function for home care tasks  - Pt. Will have improved 9 HPT of no greater than 50 sec for improved ability to complete buttoning MET        Plan   Certification Period/Plan of care expiration: 7/24/2020 to 10/2/20.     Outpatient Occupational Therapy 2 times weekly for 10 weeks to include the following interventions: Manual Therapy, Moist Heat/ Ice, Paraffin, Patient Education, Self Care, Therapeutic Activites and Therapeutic Exercise.       Updates/Grading for next session: as tolerated       DENICE Theodore

## 2020-08-24 NOTE — PROGRESS NOTES
"  Occupational Therapy Treatment Note     Date: 8/25/2020  Name: Bj Pate Jr.  Clinic Number: [id number]    Therapy Diagnosis:   Encounter Diagnoses   Name Primary?    Decreased ROM of left shoulder Yes    Decreased ROM of right shoulder     Alteration in instrumental activities of daily living (IADL)     Decreased  strength      Physician: Lombard, Azikiwe K., MD    Physician Orders: OT Eval and treat  Medical Diagnosis: CVA  Evaluation Date: 7/24/2020  Plan of Care Expiration Period: 10/2/20  Insurance Authorization period Expiration: 8/14/20  Date of Return to MD: TBD  Visit # / Visits Authorized: 5 / 6  FOTO: 5th visit     Time In: 4:20pm  Time Out: 5:05 pm  Total Billable (one on one) Time: 45 minutes     Precautions: Standard and Fall    Subjective     Pt reports: "I am doing a little better."   He was compliant with HEP  Response to previous treatment: positive   Functional change: I can raise my arm a little higher    Pain: 5/10  Location: n/a    Objective       Bj received therapeutic exercises for 45 minutes including: increased time needed due to fatigue     - UBE x 6 min with verbal cues for directional changes every minute  -Non resistive pulley stretch for scaption x 3 min                                                     Flexion x 3 min  -green thera tubing rows for scapular retraction x 2 sets 10 reps verbal cues to   -red theraband ER x 2 sets of 10 reps  -2# dowel seated flexion x 1 set 9 reps  1 set 8 reps  -2# chest press 2 sets 5 reps  -cup stacks right and left x 12 reps                                            With 1# wrist weight x 12 reps    Home Exercises and Education Provided     Education provided:   - encouraged him to perform HEP  - Progress towards goals   -importance of scapula for shoulder motion    Written Home Exercises Provided: continue with previously instructed HEP  Exercises were reviewed and Bj was able to demonstrate them prior to the end of the " session.  Bj demonstrated good  understanding of the HEP provided.   .   See EMR under Patient Instructions for exercises provided 8/5/20.        Assessment     Bj is fully engaged in performing during his therapy session.  Difficulty with light resisted shoulder exercises noted.     Bj is progressing well towards his goals and there are no updates to goals at this time. Pt prognosis is Good.     Pt will continue to benefit from skilled outpatient occupational therapy to address the deficits listed in the problem list on initial evaluation provide pt/family education and to maximize pt's level of independence in the home and community environment.     Anticipated barriers to occupational therapy: None    Pt's spiritual, cultural and educational needs considered and pt agreeable to plan of care and goals.    Goals:  Short Term Goals: 5 weeks   - Pt. Will be educated in HEP  MET  - Pt. Will have improved b/l shoulder AROM in FF, Abd, and ext if at least 10 degrees for easier dressing and bathing MET  - Pt. Will report no greater than 4/10 pain over 4 consecutive tx sessions  - Pt. Will have improved b/l  strength of at least 5# for increased functional hand use  - Pt. Will complete fine motor and pinch testing and goals will be established if appropriate MET     Long Term Goals: 10 weeks   - Pt. Will be independent with HEP  - Pt. Will have improve L AROM ER of at least 10 degrees MET  - Pt. Will have improved b/l shoulder AROM in FF, Abd, and ext if at least 15 degrees for easier dressing and bathing  - Pt. Will report improved hand function for home care tasks  - Pt. Will have improved 9 HPT of no greater than 50 sec for improved ability to complete buttoning MET        Plan   Certification Period/Plan of care expiration: 7/24/2020 to 10/2/20.     Outpatient Occupational Therapy 2 times weekly for 10 weeks to include the following interventions: Manual Therapy, Moist Heat/ Ice, Paraffin, Patient  Education, Self Care, Therapeutic Activites and Therapeutic Exercise.       Updates/Grading for next session: as tolerated       DENICE Kinney

## 2020-08-25 ENCOUNTER — CLINICAL SUPPORT (OUTPATIENT)
Dept: REHABILITATION | Facility: HOSPITAL | Age: 70
End: 2020-08-25
Attending: FAMILY MEDICINE
Payer: MEDICARE

## 2020-08-25 DIAGNOSIS — M25.611 DECREASED ROM OF RIGHT SHOULDER: ICD-10-CM

## 2020-08-25 DIAGNOSIS — R26.9 GAIT ABNORMALITY: ICD-10-CM

## 2020-08-25 DIAGNOSIS — R26.89 BALANCE PROBLEM: ICD-10-CM

## 2020-08-25 DIAGNOSIS — M25.612 DECREASED ROM OF LEFT SHOULDER: Primary | ICD-10-CM

## 2020-08-25 DIAGNOSIS — R68.89 DECREASED STRENGTH, ENDURANCE, AND MOBILITY: Primary | ICD-10-CM

## 2020-08-25 DIAGNOSIS — Z78.9 ALTERATION IN INSTRUMENTAL ACTIVITIES OF DAILY LIVING (IADL): ICD-10-CM

## 2020-08-25 DIAGNOSIS — R29.898 DECREASED GRIP STRENGTH: ICD-10-CM

## 2020-08-25 DIAGNOSIS — R53.1 DECREASED STRENGTH, ENDURANCE, AND MOBILITY: Primary | ICD-10-CM

## 2020-08-25 DIAGNOSIS — Z74.09 DECREASED STRENGTH, ENDURANCE, AND MOBILITY: Primary | ICD-10-CM

## 2020-08-25 PROCEDURE — 97110 THERAPEUTIC EXERCISES: CPT | Mod: PN

## 2020-08-25 PROCEDURE — 97112 NEUROMUSCULAR REEDUCATION: CPT | Mod: PN

## 2020-08-25 NOTE — PROGRESS NOTES
"  Physical Therapy Daily Treatment Note     Name: Bj Pate Jr.  Clinic Number: [id number]    Therapy Diagnosis:   Encounter Diagnoses   Name Primary?    Decreased strength, endurance, and mobility Yes    Gait abnormality     Balance problem      Physician: Lombard, Azikiwe K., MD    Visit Date: 8/25/2020    Physician Orders: PT Eval and Treat      Medical Diagnosis from Referral:   M1A.09X0 (ICD-10-CM) - Idiopathic chronic gout of multiple sites without tophus   R53.81 (ICD-10-CM) - Physical debility      Evaluation Date: 6/9/2020  Authorization Period Expiration: 6/10/2020  Plan of Care Expiration: 9/9/2020  Visit # / Visits authorized: 6/11                 PN due: 8/22/2020     Time In: 2:30 pm   Time Out: 3:15 pm  Total Billable Time: 45 minutes     Precautions: Standard and Diabetes    Subjective     Pt reports: States he notices improvement in his functional mobility since beginning therapy. States that he has not had any recent falls. Reports feeling good after using cane last session in clinic.     He was compliant with home exercise program.  Response to previous treatment: ongoing  Functional change: ongoing    Pain: 5/10  Location: bilateral shoulder     Pts goals: "I would like to regain my strength, use of my limbs"   Objective     93      130     Lower Extremity Strength    RLE LLE   Hip Flexion: 5/5 5/5   Hip Extension:  NT NT   Hip Abduction: 4/5 4/5   Hip Adduction: 4+/5 4+/5   Knee Extension: 5/5 5/5   Knee Flexion: 4+/5 4+/5   Ankle Dorsiflexion: 5/5 5/5      Observation: unsteadily gait.          Evaluation   Single Limb Stance R LE < 1 sec  (<10 sec = HIGH FALL RISK)   Single Limb Stance L LE < 1 sec  (<10 sec = HIGH FALL RISK)   5 times sit-stand 14 seconds (17 in)  >12 sec= fall risk for general elderly  >16 sec= fall risk for Parkinson's disease  >10 sec= balance/vestibular dysfunction (<61 y/o)  >14.2 sec= balance/vestibular dysfunction (>61 y/o)  >12 sec= fall risk for CVA " "     Postural control:  MCTSIB:  1. Eyes Open/feet together/Firm: 30 seconds  2. Eyes Closed/feet together/Firm: 30 seconds  3. Eyes Open/feet together/Foam: 30 seconds  4. Eyes Closed/feet together/Foam: 30 seconds     Gait Assessment:   - AD used: RW, 2 wheel  - Assistance: mod I  - Distance: 300 ft     GAIT DEVIATIONS:   Decreased step length bilaterally  Forward trunk lean throughout gait  Reliance on UE support  Decreased gait speed  Decreased knee flexion throughout swing phase bilaterally     * Above impairments indicate decreased gait safety and increased fall risk.     Impairments contributing to deviations: impaired motor control, decreased endurance, decreased ROM, impaired strength of LEs     Endurance Deficit: yes        Evaluation   Timed Up and Go  23.6 sec (24 + 23 + 24)  < 20 sec safe for independent transfers,     < 30 sec assist required for transfers   Self-Selected Gait Speed 0.42 m/sec (6m/14s)   Fast Gait Speed 0.42 m/sec (6/14s)       Bj received therapeutic exercises to develop strength for 25 minutes including:    NuStep x 5' Level 5 hills mode   Standing hip 4 ways No T-band 2x10   Mini squat 2x10    Standing Marches  2x10  Matrix leg press 50# 2x10    NP:  Pulley Flex x 3'  Pulley PoS x 3'  Standing Rows GTB x 30  Standing Extension GTB x 30  No-Money (Seated) RTB x 30  Standing Palloff Press x 30  Matrix leg extension 40# 2x15  Matrix hamstring curls 45# 2x10  Standing heel raises 1x10   Matrix rows 40# 3x10  Seated PF against GTB 2x10  Toe taps to 8 in c/ one UE support 2x10  Matrix hip abd 45# 2x 10  Matrix hip add 45# 2x 10   Step ups 4 in step 2x10  Lateral step ups 4 in step 2x10  Sit<>Stand x 30 18" + Ball Lifts   side steps inside parallel bar YTB 3 laps    Bj participated in neuromuscular re-education activities to improve: Balance for 00 minutes. The following activities were included:    Static Standing-Feet Together 2x1'   Semi-Tandem Standing 2x1' NP  Semi-Tandem " Weight Shifting 2 x 30 NP  Static Standing on AirEx Feet Together 2x30  Static Standing on AirEx Feet Apart  2x30  Step ups to foam 2x10 B LE  NBOS trunk rotation passing ball to PT x 10  NP - Stepping over purple noodle x 10 ea LE  Static Standing on BOSU Feet Together x 3' NP  Static Standing on BOSU Feet Apart x 3' NP    Bj participated in gait training to improve functional mobility and safety for 20  minutes, including:  Gait training in // bars with single point cane x 4 laps  Gait training in // bars with small base quad cane x 4 laps  50 ft with SBQC and S- vc for sequencing and placement of cane   90 ft with SBQC and S  +Lap around gym x 2 with SBQC and S   +Ambulating outside on concrete x 100 feet c/ SBQC and S  +Ascending/descending ramp with SBQC and S  +Curb c/ SBQC and S      Home Exercises Provided and Patient Education Provided     Education provided:   Cont to perform HEP as provided.     Written Home Exercises Provided: Patient instructed to cont prior HEP.  Exercises were reviewed and Bj was able to demonstrate them prior to the end of the session.  Bj demonstrated fair  understanding of the education provided.     See EMR under Patient Instructions for exercises provided 06/09/2020.    Assessment     Pt tolerated session well overall with no adverse response. Progress note performed this session. Pt with some decline noted in self selected and fast gait speed and TUG score this session. This is secondary to testing done today with pt utilizing SBQC rather than FWW. Pt with improvement noted in balance as noted by MCTSIB score, passing condition 4 today. Pt overall demonstrating significant improvement in endurance and quality of gait by ability to safely ambulate 2 lasp around gym with SBQC. Pt also able to demo safe use of SBQC with outdoor navigation. Pt remains appropriate at this time to continue LE strengthening and endurance training, focusing on safe use of SBQC at home and in  community. Pt met 1 LTG and 1 STG at this time, remaining goals continue to be appropriate.     Bj is progressing well towards his goals.   Pt prognosis is Good.     Pt will continue to benefit from skilled outpatient physical therapy to address the deficits listed in the problem list box on initial evaluation, provide pt/family education and to maximize pt's level of independence in the home and community environment.     Pt's spiritual, cultural and educational needs considered and pt agreeable to plan of care and goals.    Anticipated barriers to physical therapy: none    Goals:   Short Term Goals: 4 weeks   1. Pt to improve UE strength by > / = 1/2 MMT score in order to improve tolerance to daily activities. Goal In progress  7-  2. Pt to improve B LE strength by > 1/2 MMT score in order to improve functional indepdence. Goal in progress  7-  3. Pt to demonstrate improvement in gait speed to > / = m/s in order to demonstrate improved community ambulation Goal in progress 7-.  4. Pt to improve condition 4 of MSTCIB to > / = 20 seconds in order to improve safety.goal in progress MET (8/25/2020)  5. Pt to ambulate x 100 ft without assistive device and with min A in order to demonstrate improved functional independence. Goal in progress  7-     Long Term Goals: 8 weeks   1. Pt to improve UE strength by > / = 1 MMT score in order to improve tolerance to daily activities.  2. Pt to improve B LE strength by > 1 MMT score in order to improve functional indepdence.   3. Pt to demonstrate improvement in gait speed to > / = m/s in order to demonstrate improved community ambulation.  4. Pt to improve condition 4 of MSTCIB to 30 seconds in order to improve safety. - MET (8/51/2020)  5. Pt to ambulate x 100 ft without assistive device independently in order to demonstrate improved functional independence.    Plan      Plan of care Certification: 6/9/2020 to 9/9/2020.     Outpatient Physical  Therapy 2 times weekly for 8 weeks to include the following interventions: Gait Training, Manual Therapy, Moist Heat/ Ice, Neuromuscular Re-ed, Patient Education, Therapeutic Activites and Therapeutic Exercise.     Cont skilled PT session towards PT and patient's goals.    Ligia Ibarra, PT

## 2020-08-28 ENCOUNTER — CLINICAL SUPPORT (OUTPATIENT)
Dept: REHABILITATION | Facility: HOSPITAL | Age: 70
End: 2020-08-28
Attending: FAMILY MEDICINE
Payer: MEDICARE

## 2020-08-28 ENCOUNTER — PATIENT OUTREACH (OUTPATIENT)
Dept: ADMINISTRATIVE | Facility: HOSPITAL | Age: 70
End: 2020-08-28

## 2020-08-28 DIAGNOSIS — R26.9 GAIT ABNORMALITY: ICD-10-CM

## 2020-08-28 DIAGNOSIS — Z74.09 DECREASED STRENGTH, ENDURANCE, AND MOBILITY: Primary | ICD-10-CM

## 2020-08-28 DIAGNOSIS — M25.612 DECREASED ROM OF LEFT SHOULDER: Primary | ICD-10-CM

## 2020-08-28 DIAGNOSIS — R68.89 DECREASED STRENGTH, ENDURANCE, AND MOBILITY: Primary | ICD-10-CM

## 2020-08-28 DIAGNOSIS — R29.898 DECREASED GRIP STRENGTH: ICD-10-CM

## 2020-08-28 DIAGNOSIS — R53.1 DECREASED STRENGTH, ENDURANCE, AND MOBILITY: Primary | ICD-10-CM

## 2020-08-28 DIAGNOSIS — Z78.9 ALTERATION IN INSTRUMENTAL ACTIVITIES OF DAILY LIVING (IADL): ICD-10-CM

## 2020-08-28 DIAGNOSIS — M25.611 DECREASED ROM OF RIGHT SHOULDER: ICD-10-CM

## 2020-08-28 DIAGNOSIS — R26.89 BALANCE PROBLEM: ICD-10-CM

## 2020-08-28 PROCEDURE — 97110 THERAPEUTIC EXERCISES: CPT | Mod: PN

## 2020-08-28 PROCEDURE — 97112 NEUROMUSCULAR REEDUCATION: CPT | Mod: PN

## 2020-08-28 NOTE — PROGRESS NOTES
"  Physical Therapy Daily Treatment Note     Name: Bj Pate Jr.  Clinic Number: [id number]    Therapy Diagnosis:   Encounter Diagnoses   Name Primary?    Decreased strength, endurance, and mobility Yes    Gait abnormality     Balance problem      Physician: Lombard, Azikiwe K., MD    Visit Date: 8/28/2020    Physician Orders: PT Eval and Treat      Medical Diagnosis from Referral:   M1A.09X0 (ICD-10-CM) - Idiopathic chronic gout of multiple sites without tophus   R53.81 (ICD-10-CM) - Physical debility      Evaluation Date: 6/9/2020  Authorization Period Expiration: 6/10/2020  Plan of Care Expiration: 9/9/2020  Visit # / Visits authorized: 7/11          PN due:9/25/2020     Time In: 3:15 pm   Time Out: 4:00 pm  Total Billable Time: 45 minutes     Precautions: Standard and Diabetes    Subjective     Pt reports: Feeling good today. States he does not use walker at home, no falls.     He was compliant with home exercise program.  Response to previous treatment: ongoing  Functional change: ongoing    Pain: 5/10  Location: bilateral shoulder     Pts goals: "I would like to regain my strength, use of my limbs"   Objective       Bj received therapeutic exercises to develop strength for 30 minutes including:    NuStep x 5' Level 5 hills mode     Standing Marches  2x10  Matrix leg press 50# 2x10  Matrix hip abd 45# 2x 10  Matrix hip add 45# 2x 10   Matrix leg extension 40# 2x15  Matrix hamstring curls 45# 2x10    NP:  Pulley Flex x 3'  Pulley PoS x 3'  Standing Rows GTB x 30  Standing Extension GTB x 30  No-Money (Seated) RTB x 30  Standing Palloff Press x 30  Standing hip 4 ways No T-band 2x10   Mini squat 2x10    Standing heel raises 1x10   Matrix rows 40# 3x10  Seated PF against GTB 2x10  Toe taps to 8 in c/ one UE support 2x10    Step ups 4 in step 2x10  Lateral step ups 4 in step 2x10  Sit<>Stand x 30 18" + Ball Lifts   side steps inside parallel bar YTB 3 laps    Bj participated in neuromuscular " re-education activities to improve: Balance for 10 minutes. The following activities were included:    Static Standing-Feet Together 2x1'   Semi-Tandem Standing 2x1' NP  Semi-Tandem Weight Shifting 2 x 30 NP  Static Standing on AirEx Feet Together 2x30  Static Standing on AirEx Feet Apart  2x30  Step ups to foam 2x10 B LE  NBOS trunk rotation passing ball to PT x 10  NP - Stepping over purple noodle x 10 ea LE  Static Standing on BOSU Feet Together x 3' NP  Static Standing on BOSU Feet Apart x 3' NP    Bj participated in gait training to improve functional mobility and safety for 20  minutes, including:  +Gait training in // bars with single point cane x 4 laps  +Gait training in // bars with small base quad cane x 4 laps  +50 ft with SBQC and S- vc for sequencing and placement of cane   +90 ft with SBQC and S  Laps around gym x 2 with SBQC and S  - two rest breaks  Ambulating outside on concrete x 100 feet c/ SBQC and S  Ascending/descending ramp with SBQC and S  Curb c/ SBQC and S        Home Exercises Provided and Patient Education Provided     Education provided:   Cont to perform HEP as provided.     Written Home Exercises Provided: Patient instructed to cont prior HEP.  Exercises were reviewed and Bj was able to demonstrate them prior to the end of the session.  Bj demonstrated fair  understanding of the education provided.     See EMR under Patient Instructions for exercises provided 06/09/2020.    Assessment     Pt tolerated session well overall with no adverse response. Pt demonstrated good endurance this session with laps around gym using SBQC. Pt requiring 2 rest breaks between laps. Pt progressing well with therapy at this time, continue there-ex for LE strength and endurance as appropriate.     Bj is progressing well towards his goals.   Pt prognosis is Good.     Pt will continue to benefit from skilled outpatient physical therapy to address the deficits listed in the problem list box on  initial evaluation, provide pt/family education and to maximize pt's level of independence in the home and community environment.     Pt's spiritual, cultural and educational needs considered and pt agreeable to plan of care and goals.    Anticipated barriers to physical therapy: none    Goals:   Short Term Goals: 4 weeks   1. Pt to improve UE strength by > / = 1/2 MMT score in order to improve tolerance to daily activities. Goal In progress  7-  2. Pt to improve B LE strength by > 1/2 MMT score in order to improve functional indepdence. Goal in progress  7-  3. Pt to demonstrate improvement in gait speed to > / = m/s in order to demonstrate improved community ambulation Goal in progress 7-.  4. Pt to improve condition 4 of MSTCIB to > / = 20 seconds in order to improve safety.goal in progress 7-  5. Pt to ambulate x 100 ft without assistive device and with min A in order to demonstrate improved functional independence. Goal in progress  7-     Long Term Goals: 8 weeks   1. Pt to improve UE strength by > / = 1 MMT score in order to improve tolerance to daily activities.  2. Pt to improve B LE strength by > 1 MMT score in order to improve functional indepdence.   3. Pt to demonstrate improvement in gait speed to > / = m/s in order to demonstrate improved community ambulation.  4. Pt to improve condition 4 of MSTCIB to 30 seconds in order to improve safety.  5. Pt to ambulate x 100 ft without assistive device independently in order to demonstrate improved functional independence.    Plan      Plan of care Certification: 6/9/2020 to 9/9/2020.     Outpatient Physical Therapy 2 times weekly for 8 weeks to include the following interventions: Gait Training, Manual Therapy, Moist Heat/ Ice, Neuromuscular Re-ed, Patient Education, Therapeutic Activites and Therapeutic Exercise.     Cont skilled PT session towards PT and patient's goals.    Ligia Ibarra, PT    08/31/2020 Dictation  #1  MRN:[id number]  CSN:[id number]

## 2020-08-28 NOTE — PROGRESS NOTES
"  Occupational Therapy Treatment Note     Date: 8/28/2020  Name: Bj Pate Jr.  Clinic Number: [id number]    Therapy Diagnosis:   Encounter Diagnoses   Name Primary?    Decreased ROM of left shoulder Yes    Decreased ROM of right shoulder     Alteration in instrumental activities of daily living (IADL)     Decreased  strength      Physician: Lombard, Azikiwe K., MD    Physician Orders: OT Eval and treat  Medical Diagnosis: CVA  Evaluation Date: 7/24/2020  Plan of Care Expiration Period: 10/2/20  Insurance Authorization period Expiration: 8/14/20  Date of Return to MD: TBD  Visit # / Visits Authorized: 6 / 6  FOTO: 5th visit     Time In: 2:30pm  Time Out: 3:15pm  Total Billable (one on one) Time: 45 minutes     Precautions: Standard and Fall    Subjective     Pt reports: "Doing ok. Ready to work"   He was compliant with HEP  Response to previous treatment: positive   Functional change: I can raise my arm a little higher    Pain: 5/10   Location: n/a    Objective       Bj received therapeutic exercises for 45 minutes including: increased time needed due to fatigue     - UBE x 8 min with verbal cues for directional changes every minute  - Non resistive pulley stretch for scaption x 3 min                                                     Flexion x 3 min  -Matrix machine 55# rows 2x25 cues for good technique  -red theraband ER x 2 sets of 25 reps  - 2# dowel seated flexion x 1 set 9 reps  1 set 8 reps  -25# 4x10 chest press matrix machine   - (NP) cup stacks right and left x 12 reps                                            With 1# wrist weight x 12 reps    Home Exercises and Education Provided     Education provided:   - encouraged him to perform HEP  - Progress towards goals   -importance of scapula for shoulder motion    Written Home Exercises Provided: continue with previously instructed HEP  Exercises were reviewed and Bj was able to demonstrate them prior to the end of the session.  Bj " demonstrated good  understanding of the HEP provided.   .   See EMR under Patient Instructions for exercises provided 8/5/20.        Assessment     Bj had good tolerance to tx this date ER and chest press most difficult secondary to muscle weakness. He is active with his HEP and completes his exercises regularly.     Bj is fully engaged in performing during his therapy session.  Difficulty with light resisted shoulder exercises noted.     Bj is progressing well towards his goals and there are no updates to goals at this time. Pt prognosis is Good.     Pt will continue to benefit from skilled outpatient occupational therapy to address the deficits listed in the problem list on initial evaluation provide pt/family education and to maximize pt's level of independence in the home and community environment.     Anticipated barriers to occupational therapy: None    Pt's spiritual, cultural and educational needs considered and pt agreeable to plan of care and goals.    Goals:  Short Term Goals: 5 weeks   - Pt. Will be educated in HEP  MET  - Pt. Will have improved b/l shoulder AROM in FF, Abd, and ext if at least 10 degrees for easier dressing and bathing MET  - Pt. Will report no greater than 4/10 pain over 4 consecutive tx sessions  - Pt. Will have improved b/l  strength of at least 5# for increased functional hand use  - Pt. Will complete fine motor and pinch testing and goals will be established if appropriate MET     Long Term Goals: 10 weeks   - Pt. Will be independent with HEP  - Pt. Will have improve L AROM ER of at least 10 degrees MET  - Pt. Will have improved b/l shoulder AROM in FF, Abd, and ext if at least 15 degrees for easier dressing and bathing  - Pt. Will report improved hand function for home care tasks  - Pt. Will have improved 9 HPT of no greater than 50 sec for improved ability to complete buttoning MET        Plan   Certification Period/Plan of care expiration: 7/24/2020 to  10/2/20.     Outpatient Occupational Therapy 2 times weekly for 10 weeks to include the following interventions: Manual Therapy, Moist Heat/ Ice, Paraffin, Patient Education, Self Care, Therapeutic Activites and Therapeutic Exercise.       Updates/Grading for next session: as tolerated       DENICE Theodore LOTR

## 2020-08-31 ENCOUNTER — CLINICAL SUPPORT (OUTPATIENT)
Dept: REHABILITATION | Facility: HOSPITAL | Age: 70
End: 2020-08-31
Attending: FAMILY MEDICINE
Payer: MEDICARE

## 2020-08-31 DIAGNOSIS — R29.898 DECREASED GRIP STRENGTH: ICD-10-CM

## 2020-08-31 DIAGNOSIS — M25.611 DECREASED ROM OF RIGHT SHOULDER: ICD-10-CM

## 2020-08-31 DIAGNOSIS — Z78.9 ALTERATION IN INSTRUMENTAL ACTIVITIES OF DAILY LIVING (IADL): ICD-10-CM

## 2020-08-31 DIAGNOSIS — M25.612 DECREASED ROM OF LEFT SHOULDER: Primary | ICD-10-CM

## 2020-08-31 PROCEDURE — 97110 THERAPEUTIC EXERCISES: CPT | Mod: PN

## 2020-08-31 NOTE — PROGRESS NOTES
"  Occupational Therapy Treatment Note     Date: 8/31/2020  Name: Bj Pate Jr.  Clinic Number: [id number]    Therapy Diagnosis:   Encounter Diagnoses   Name Primary?    Decreased ROM of left shoulder Yes    Decreased ROM of right shoulder     Alteration in instrumental activities of daily living (IADL)     Decreased  strength      Physician: Lombard, Azikiwe K., MD    Physician Orders: OT Eval and treat  Medical Diagnosis: CVA  Evaluation Date: 7/24/2020  Plan of Care Expiration Period: 10/2/20  Insurance Authorization period Expiration: 9/15/20  Date of Return to MD: TBD  Visit # / Visits Authorized: 7/18  FOTO: 5th visit     Time In: 5:30pm  Time Out: 6:15pm  Total Billable (one on one) Time: 45 minutes     Precautions: Standard and Fall    Subjective     Pt reports: "I can feel it in my shoulders"  He was compliant with HEP  Response to previous treatment: positive   Functional change: Improved shoulder movement     Pain: 5/10   Location: generalized     Objective       Bj received therapeutic exercises for 45 minutes including: increased time needed due to fatigue     - UBE Res 1.5 x 10 min with verbal cues for directional changes every minute  - Non resistive pulley stretch for scaption x 3 min                                                     Flexion x 3 min  -Matrix machine 55# rows 2x25 cues for good technique  -red theraband ER x 2 sets of 25 reps  - 2# dowel seated flexion 2x10  -10# 4x10 chest press matrix machine   - cup stacks right and left x 12 reps                                            With 1# wrist weight x 12 reps    Home Exercises and Education Provided     Education provided:   - encouraged him to perform HEP  - Progress towards goals   -importance of scapula for shoulder motion    Written Home Exercises Provided: continue with previously instructed HEP  Exercises were reviewed and Bj was able to demonstrate them prior to the end of the session.  Bj demonstrated good  " understanding of the HEP provided.   .   See EMR under Patient Instructions for exercises provided 8/5/20.        Assessment       Bj had good tolerance to tx this date. Shoulder HEP to be added next visit with increased focus on ER. HE continues with bilateral shoulder and hand weakness affecting his ADL/IADLs speed.     Bj is progressing well towards his goals and there are no updates to goals at this time. Pt prognosis is Good.     Pt will continue to benefit from skilled outpatient occupational therapy to address the deficits listed in the problem list on initial evaluation provide pt/family education and to maximize pt's level of independence in the home and community environment.     Anticipated barriers to occupational therapy: None    Pt's spiritual, cultural and educational needs considered and pt agreeable to plan of care and goals.    Goals:  Short Term Goals: 5 weeks   - Pt. Will be educated in HEP  MET  - Pt. Will have improved b/l shoulder AROM in FF, Abd, and ext if at least 10 degrees for easier dressing and bathing MET  - Pt. Will report no greater than 4/10 pain over 4 consecutive tx sessions  - Pt. Will have improved b/l  strength of at least 5# for increased functional hand use  - Pt. Will complete fine motor and pinch testing and goals will be established if appropriate MET     Long Term Goals: 10 weeks   - Pt. Will be independent with HEP  - Pt. Will have improve L AROM ER of at least 10 degrees MET  - Pt. Will have improved b/l shoulder AROM in FF, Abd, and ext if at least 15 degrees for easier dressing and bathing  - Pt. Will report improved hand function for home care tasks  - Pt. Will have improved 9 HPT of no greater than 50 sec for improved ability to complete buttoning MET        Plan   Certification Period/Plan of care expiration: 7/24/2020 to 10/2/20.     Outpatient Occupational Therapy 2 times weekly for 10 weeks to include the following interventions: Manual Therapy,  Moist Heat/ Ice, Paraffin, Patient Education, Self Care, Therapeutic Activites and Therapeutic Exercise.       Updates/Grading for next session: as tolerated       DENICE Theodore LOTR

## 2020-09-02 ENCOUNTER — CLINICAL SUPPORT (OUTPATIENT)
Dept: REHABILITATION | Facility: HOSPITAL | Age: 70
End: 2020-09-02
Attending: FAMILY MEDICINE
Payer: MEDICARE

## 2020-09-02 DIAGNOSIS — R26.9 GAIT ABNORMALITY: ICD-10-CM

## 2020-09-02 DIAGNOSIS — R26.89 BALANCE PROBLEM: ICD-10-CM

## 2020-09-02 DIAGNOSIS — Z74.09 DECREASED STRENGTH, ENDURANCE, AND MOBILITY: ICD-10-CM

## 2020-09-02 DIAGNOSIS — R53.1 DECREASED STRENGTH, ENDURANCE, AND MOBILITY: ICD-10-CM

## 2020-09-02 DIAGNOSIS — R68.89 DECREASED STRENGTH, ENDURANCE, AND MOBILITY: ICD-10-CM

## 2020-09-02 PROCEDURE — 97116 GAIT TRAINING THERAPY: CPT | Mod: PN,CQ

## 2020-09-02 PROCEDURE — 97110 THERAPEUTIC EXERCISES: CPT | Mod: PN,CQ

## 2020-09-02 NOTE — PROGRESS NOTES
"  Physical Therapy Daily Treatment Note     Name: Bj Pate Jr.  Clinic Number: [id number]    Therapy Diagnosis:   Encounter Diagnoses   Name Primary?    Decreased strength, endurance, and mobility     Gait abnormality     Balance problem      Physician: Lombard, Azikiwe K., MD    Visit Date: 9/2/2020    Physician Orders: PT Eval and Treat      Medical Diagnosis from Referral:   M1A.09X0 (ICD-10-CM) - Idiopathic chronic gout of multiple sites without tophus   R53.81 (ICD-10-CM) - Physical debility      Evaluation Date: 6/9/2020  Authorization Period Expiration: 6/10/2020  Plan of Care Expiration: 9/9/2020  Visit # / Visits authorized: 1/5 ( 8 total)        PN due:9/25/2020     Time In: 14:16   Time Out: 1505  Total Time: 49  Total Billable Time: 49 minutes     Precautions: Standard and Diabetes    Subjective     Pt reports: Feeling good today, still has not experienced any falls at home.    He was compliant with home exercise program.  Response to previous treatment: ongoing  Functional change: ongoing    Pain: 5/10  Location: bilateral shoulder     Pts goals: "I would like to regain my strength, use of my limbs"   Objective       Bj received therapeutic exercises to develop strength for 30 minutes including:    NuStep x 5' Level 5 hills mode     Standing Marches  2x10  Matrix leg press 55# 2x10  Matrix hip abd 45# 2x 10  Matrix hip add 45# 2x 10   Matrix leg extension 40# 2x15  Matrix hamstring curls 45# 2x10    NP:  Pulley Flex x 3'  Pulley PoS x 3'  Standing Rows GTB x 30  Standing Extension GTB x 30  No-Money (Seated) RTB x 30  Standing Palloff Press x 30  Standing hip 4 ways No T-band 2x10   Mini squat 2x10    Standing heel raises 1x10   Matrix rows 40# 3x10  Seated PF against GTB 2x10  Toe taps to 8 in c/ one UE support 2x10    Step ups 4 in step 2x10  Lateral step ups 4 in step 2x10  Sit<>Stand x 30 18" + Ball Lifts   side steps inside parallel bar YTB 3 laps    Bj participated in neuromuscular " re-education activities to improve: Balance for 9 minutes. The following activities were included:    Static Standing on AirEx Feet Together EC 2x30  Static Standing on AirEx Feet Apart EC  2x30  Step ups to foam 2x10 B LE    NBOS trunk rotation passing ball to PT x 10 (NP)    NP:  Static Standing-Feet Together 2x1' - NP  NP - Stepping over purple noodle x 10 ea LE  Static Standing on BOSU Feet Together x 3' NP  Static Standing on BOSU Feet Apart x 3' NP  Semi-Tandem Standing 2x1' NP  Semi-Tandem Weight Shifting 2 x 30 NP    Bj participated in gait training to improve functional mobility and safety for 10  minutes, including:    +90 ft x2  with SPC, SBA (seated rest break in between)      NP (no time):   +Gait training in // bars with single point cane x 4 laps NP  +Gait training in // bars with small base quad cane x 4 lapsNP  +50 ft with SBQC and S- vc for sequencing and placement of cane NP  Laps around gym x 2 with SBQC and S  - two rest breaks NP  Ambulating outside on concrete x 100 feet c/ SBQC and S NP  Ascending/descending ramp with SBQC and S NP  Curb c/ SBQC and S  NP      Home Exercises Provided and Patient Education Provided     Education provided:   Cont to perform HEP as provided.     Written Home Exercises Provided: Patient instructed to cont prior HEP.  Exercises were reviewed and Bj was able to demonstrate them prior to the end of the session.  Bj demonstrated fair  understanding of the education provided.     See EMR under Patient Instructions for exercises provided 06/09/2020.    Assessment     Pt tolerated session well overall with no adverse response. Pt demonstrated good endurance this session with ambulation using SPC.  Require close SBA, cues for upright posture and sequencing with gait training.  Pt requiring 1 seated rest break between laps.  Able to perform WBOS and NBOS on blue foam with eyes closed without LOB. Pt progressing well with therapy at this time, continue there-ex  for LE strength and endurance as appropriate.     Bj is progressing well towards his goals.   Pt prognosis is Good.     Pt will continue to benefit from skilled outpatient physical therapy to address the deficits listed in the problem list box on initial evaluation, provide pt/family education and to maximize pt's level of independence in the home and community environment.     Pt's spiritual, cultural and educational needs considered and pt agreeable to plan of care and goals.    Anticipated barriers to physical therapy: none    Goals:   Short Term Goals: 4 weeks   1. Pt to improve UE strength by > / = 1/2 MMT score in order to improve tolerance to daily activities. Goal In progress  7-  2. Pt to improve B LE strength by > 1/2 MMT score in order to improve functional indepdence. Goal in progress  7-  3. Pt to demonstrate improvement in gait speed to > / = m/s in order to demonstrate improved community ambulation Goal in progress 7-.  4. Pt to improve condition 4 of MSTCIB to > / = 20 seconds in order to improve safety.goal in progress 7-  5. Pt to ambulate x 100 ft without assistive device and with min A in order to demonstrate improved functional independence. Goal in progress  7-     Long Term Goals: 8 weeks   1. Pt to improve UE strength by > / = 1 MMT score in order to improve tolerance to daily activities.  2. Pt to improve B LE strength by > 1 MMT score in order to improve functional indepdence.   3. Pt to demonstrate improvement in gait speed to > / = m/s in order to demonstrate improved community ambulation.  4. Pt to improve condition 4 of MSTCIB to 30 seconds in order to improve safety.  5. Pt to ambulate x 100 ft without assistive device independently in order to demonstrate improved functional independence.    Plan      Plan of care Certification: 6/9/2020 to 9/9/2020.     Outpatient Physical Therapy 2 times weekly for 8 weeks to include the following  interventions: Gait Training, Manual Therapy, Moist Heat/ Ice, Neuromuscular Re-ed, Patient Education, Therapeutic Activites and Therapeutic Exercise.     Cont skilled PT session towards PT and patient's goals.    Mimi Staples PTA    09/02/2020 Dictation #1  MRN:[id number]  CSN:[id number]

## 2020-09-08 ENCOUNTER — CLINICAL SUPPORT (OUTPATIENT)
Dept: REHABILITATION | Facility: HOSPITAL | Age: 70
End: 2020-09-08
Attending: FAMILY MEDICINE
Payer: MEDICARE

## 2020-09-08 DIAGNOSIS — Z74.09 DECREASED STRENGTH, ENDURANCE, AND MOBILITY: ICD-10-CM

## 2020-09-08 DIAGNOSIS — R26.9 GAIT ABNORMALITY: ICD-10-CM

## 2020-09-08 DIAGNOSIS — R68.89 DECREASED STRENGTH, ENDURANCE, AND MOBILITY: ICD-10-CM

## 2020-09-08 DIAGNOSIS — R53.1 DECREASED STRENGTH, ENDURANCE, AND MOBILITY: ICD-10-CM

## 2020-09-08 DIAGNOSIS — R26.89 BALANCE PROBLEM: ICD-10-CM

## 2020-09-08 PROCEDURE — 97110 THERAPEUTIC EXERCISES: CPT | Mod: PN

## 2020-09-08 PROCEDURE — 97112 NEUROMUSCULAR REEDUCATION: CPT | Mod: PN

## 2020-09-08 NOTE — PROGRESS NOTES
"  Physical Therapy Daily Treatment Note     Name: Bj Pate Jr.  Clinic Number: [id number]    Therapy Diagnosis:   Encounter Diagnoses   Name Primary?    Decreased strength, endurance, and mobility     Gait abnormality     Balance problem      Physician: Lombard, Azikiwe K., MD    Visit Date: 9/8/2020    Physician Orders: PT Eval and Treat      Medical Diagnosis from Referral:   M1A.09X0 (ICD-10-CM) - Idiopathic chronic gout of multiple sites without tophus   R53.81 (ICD-10-CM) - Physical debility      Evaluation Date: 6/9/2020  Authorization Period Expiration: 6/10/2020  Plan of Care Expiration: 9/9/2020  Visit # / Visits authorized: 2/5 ( 8 total)        PN due:9/25/2020     Time In: 10:51   Time Out: 11:40  Total Time: 49  Total Billable Time: 49 minutes     Precautions: Standard and Diabetes    Subjective     Pt reports: Feeling like his strength is improving, but still feels limited.    He was compliant with home exercise program.  Response to previous treatment: ongoing  Functional change: ongoing    Pain: 5/10  Location: bilateral shoulder     Pts goals: "I would like to regain my strength, use of my limbs"   Objective       Bj received therapeutic exercises to develop strength for 30 minutes including:    NuStep x 5' Level 5 hills mode     Standing Marches  2x10  Matrix leg press 60# 2x10  Matrix hip abd 50# 2x 10  Matrix hip add 45# 2x 10   Matrix leg extension 50# 2x15  Matrix hamstring curls 55# 2x10    NP:  Pulley Flex x 3'  Pulley PoS x 3'  Standing Rows GTB x 30  Standing Extension GTB x 30  No-Money (Seated) RTB x 30  Standing Palloff Press x 30  Standing hip 4 ways No T-band 2x10   Mini squat 2x10    Standing heel raises 1x10   Matrix rows 40# 3x10  Seated PF against GTB 2x10  Toe taps to 8 in c/ one UE support 2x10    Step ups 4 in step 2x10  Lateral step ups 4 in step 2x10  Sit<>Stand x 30 18" + Ball Lifts   side steps inside parallel bar YTB 3 laps    Bj participated in neuromuscular " re-education activities to improve: Balance for 9 minutes. The following activities were included:    Static Standing on AirEx Feet Together EC 2x30  Static Standing on AirEx Feet Apart EC  2x30  Step ups to foam 2x10 B LE  Standing marching on AirEx 2x1'    NBOS trunk rotation passing ball to PT x 10 (NP)    NP:  Static Standing-Feet Together 2x1' - NP  NP - Stepping over purple noodle x 10 ea LE  Static Standing on BOSU Feet Together x 3' NP  Static Standing on BOSU Feet Apart x 3' NP  Semi-Tandem Standing 2x1' NP  Semi-Tandem Weight Shifting 2 x 30 NP    Bj participated in gait training to improve functional mobility and safety for 10 minutes, including:    +90 ft x2  with SPC, SBA (seated rest break in between)      NP (no time):   +Gait training in // bars with single point cane x 4 laps NP  +Gait training in // bars with small base quad cane x 4 lapsNP  +50 ft with SBQC and S- vc for sequencing and placement of cane NP  Laps around gym x 2 with SBQC and S  - two rest breaks NP  Ambulating outside on concrete x 100 feet c/ SBQC and S NP  Ascending/descending ramp with SBQC and S NP  Curb c/ SBQC and S  NP      Home Exercises Provided and Patient Education Provided     Education provided:   Cont to perform HEP as provided.     Written Home Exercises Provided: Patient instructed to cont prior HEP.  Exercises were reviewed and Bj was able to demonstrate them prior to the end of the session.  Bj demonstrated fair  understanding of the education provided.     See EMR under Patient Instructions for exercises provided 06/09/2020.    Assessment     Pt tolerated session well overall with no complains. Progressed all strengthening with increased weights and progressed balance exercises to included standing marching on compliant surface without adverse effects. Pt demonstrated improved capacity to ambulate with SPC, SBA maintained throughout with no loss of balance. Continue to progress as tolerated.    Bj  is progressing well towards his goals.   Pt prognosis is Good.     Pt will continue to benefit from skilled outpatient physical therapy to address the deficits listed in the problem list box on initial evaluation, provide pt/family education and to maximize pt's level of independence in the home and community environment.     Pt's spiritual, cultural and educational needs considered and pt agreeable to plan of care and goals.    Anticipated barriers to physical therapy: none    Goals:   Short Term Goals: 4 weeks   1. Pt to improve UE strength by > / = 1/2 MMT score in order to improve tolerance to daily activities. Goal In progress  7-  2. Pt to improve B LE strength by > 1/2 MMT score in order to improve functional indepdence. Goal in progress  7-  3. Pt to demonstrate improvement in gait speed to > / = m/s in order to demonstrate improved community ambulation Goal in progress 7-.  4. Pt to improve condition 4 of MSTCIB to > / = 20 seconds in order to improve safety.goal in progress 7-  5. Pt to ambulate x 100 ft without assistive device and with min A in order to demonstrate improved functional independence. Goal in progress  7-     Long Term Goals: 8 weeks   1. Pt to improve UE strength by > / = 1 MMT score in order to improve tolerance to daily activities.  2. Pt to improve B LE strength by > 1 MMT score in order to improve functional indepdence.   3. Pt to demonstrate improvement in gait speed to > / = m/s in order to demonstrate improved community ambulation.  4. Pt to improve condition 4 of MSTCIB to 30 seconds in order to improve safety.  5. Pt to ambulate x 100 ft without assistive device independently in order to demonstrate improved functional independence.    Plan      Plan of care Certification: 6/9/2020 to 9/9/2020.     Outpatient Physical Therapy 2 times weekly for 8 weeks to include the following interventions: Gait Training, Manual Therapy, Moist Heat/ Ice,  Neuromuscular Re-ed, Patient Education, Therapeutic Activites and Therapeutic Exercise.     Cont skilled PT session towards PT and patient's goals.    Donny Sandoval, PT    09/08/2020 Dictation #1  MRN:[id number]  CSN:[id number]

## 2020-09-10 ENCOUNTER — CLINICAL SUPPORT (OUTPATIENT)
Dept: REHABILITATION | Facility: HOSPITAL | Age: 70
End: 2020-09-10
Attending: FAMILY MEDICINE
Payer: MEDICARE

## 2020-09-10 DIAGNOSIS — Z78.9 ALTERATION IN INSTRUMENTAL ACTIVITIES OF DAILY LIVING (IADL): ICD-10-CM

## 2020-09-10 DIAGNOSIS — M25.612 DECREASED ROM OF LEFT SHOULDER: Primary | ICD-10-CM

## 2020-09-10 DIAGNOSIS — M25.611 DECREASED ROM OF RIGHT SHOULDER: ICD-10-CM

## 2020-09-10 DIAGNOSIS — R29.898 DECREASED GRIP STRENGTH: ICD-10-CM

## 2020-09-10 PROCEDURE — 97110 THERAPEUTIC EXERCISES: CPT | Mod: PN

## 2020-09-10 NOTE — PROGRESS NOTES
"  Occupational Therapy Treatment Note     Date: 9/10/2020  Name: Bj Pate Jr.  Clinic Number: [id number]    Therapy Diagnosis:   Encounter Diagnoses   Name Primary?    Decreased ROM of left shoulder Yes    Decreased ROM of right shoulder     Alteration in instrumental activities of daily living (IADL)     Decreased  strength      Physician: Lombard, Azikiwe K., MD    Physician Orders: OT Eval and treat  Medical Diagnosis: CVA  Evaluation Date: 7/24/2020  Plan of Care Expiration Period: 10/2/20  Insurance Authorization period Expiration: 9/15/20  Date of Return to MD: TBD  Visit # / Visits Authorized: 8/18  FOTO: 5th visit     Time In: 12:45pm  Time Out: 1:30pm  Total Billable (one on one) Time: 45 minutes     Precautions: Standard and Fall    Subjective     Pt reports: "I can feel my shoulders working today"  He was compliant with HEP  Response to previous treatment: positive   Functional change: Improved shoulder movement     Pain: 5/10   Location: generalized     Objective       Bj received therapeutic exercises for 45 minutes including: increased time needed due to fatigue     - UBE Res 2.5 x 10 min with verbal cues for directional changes every minute  - Non resistive pulley stretch for scaption x 3 min                                                     Flexion x 3 min  -Matrix machine 55# rows 2x30 cues for good technique  - yellow theraband ER x 2 sets of 30 reps  - 2# dowel seated flexion 2x20  -10# 4x15 chest press matrix machine   - (NP) cup stacks right and left x 12 reps With 1# wrist weight x 12 reps    Home Exercises and Education Provided     Education provided:   - encouraged him to perform HEP  - Progress towards goals   -importance of scapula for shoulder motion    Written Home Exercises Provided: continue with previously instructed HEP  Exercises were reviewed and Bj was able to demonstrate them prior to the end of the session.  Bj demonstrated good  understanding of the HEP " provided.   .   See EMR under Patient Instructions for exercises provided 8/5/20.        Assessment     Bj had good tolerance to tx this date. Shoulder HEP withheld secondary to shoulder discomfort. Increased shoulder pain and fatigue noted this date. He continues with bilateral shoulder and hand weakness affecting his ADL/IADLs speed.     Bj is progressing well towards his goals and there are no updates to goals at this time. Pt prognosis is Good.     Pt will continue to benefit from skilled outpatient occupational therapy to address the deficits listed in the problem list on initial evaluation provide pt/family education and to maximize pt's level of independence in the home and community environment.     Anticipated barriers to occupational therapy: None    Pt's spiritual, cultural and educational needs considered and pt agreeable to plan of care and goals.    Goals:  Short Term Goals: 5 weeks   - Pt. Will be educated in HEP  MET  - Pt. Will have improved b/l shoulder AROM in FF, Abd, and ext if at least 10 degrees for easier dressing and bathing MET  - Pt. Will report no greater than 4/10 pain over 4 consecutive tx sessions  - Pt. Will have improved b/l  strength of at least 5# for increased functional hand use  - Pt. Will complete fine motor and pinch testing and goals will be established if appropriate MET     Long Term Goals: 10 weeks   - Pt. Will be independent with HEP  - Pt. Will have improve L AROM ER of at least 10 degrees MET  - Pt. Will have improved b/l shoulder AROM in FF, Abd, and ext if at least 15 degrees for easier dressing and bathing  - Pt. Will report improved hand function for home care tasks  - Pt. Will have improved 9 HPT of no greater than 50 sec for improved ability to complete buttoning MET        Plan   Certification Period/Plan of care expiration: 7/24/2020 to 10/2/20.     Outpatient Occupational Therapy 2 times weekly for 10 weeks to include the following interventions:  Manual Therapy, Moist Heat/ Ice, Paraffin, Patient Education, Self Care, Therapeutic Activites and Therapeutic Exercise.       Updates/Grading for next session: as tolerated       DENICE Theodore LOTR

## 2020-09-14 ENCOUNTER — CLINICAL SUPPORT (OUTPATIENT)
Dept: REHABILITATION | Facility: HOSPITAL | Age: 70
End: 2020-09-14
Attending: FAMILY MEDICINE
Payer: MEDICARE

## 2020-09-14 DIAGNOSIS — Z78.9 ALTERATION IN INSTRUMENTAL ACTIVITIES OF DAILY LIVING (IADL): ICD-10-CM

## 2020-09-14 DIAGNOSIS — R26.9 GAIT ABNORMALITY: ICD-10-CM

## 2020-09-14 DIAGNOSIS — Z74.09 DECREASED STRENGTH, ENDURANCE, AND MOBILITY: Primary | ICD-10-CM

## 2020-09-14 DIAGNOSIS — R53.1 DECREASED STRENGTH, ENDURANCE, AND MOBILITY: Primary | ICD-10-CM

## 2020-09-14 DIAGNOSIS — M25.612 DECREASED ROM OF LEFT SHOULDER: Primary | ICD-10-CM

## 2020-09-14 DIAGNOSIS — M25.611 DECREASED ROM OF RIGHT SHOULDER: ICD-10-CM

## 2020-09-14 DIAGNOSIS — R68.89 DECREASED STRENGTH, ENDURANCE, AND MOBILITY: Primary | ICD-10-CM

## 2020-09-14 DIAGNOSIS — R26.89 BALANCE PROBLEM: ICD-10-CM

## 2020-09-14 DIAGNOSIS — R29.898 DECREASED GRIP STRENGTH: ICD-10-CM

## 2020-09-14 PROCEDURE — 97110 THERAPEUTIC EXERCISES: CPT | Mod: PN,CQ

## 2020-09-14 PROCEDURE — 97110 THERAPEUTIC EXERCISES: CPT | Mod: PN

## 2020-09-14 NOTE — PROGRESS NOTES
"  Occupational Therapy Treatment Note     Date: 9/14/2020  Name: Bj Pate Jr.  Clinic Number: [id number]    Therapy Diagnosis:   Encounter Diagnoses   Name Primary?    Decreased ROM of left shoulder Yes    Decreased ROM of right shoulder     Alteration in instrumental activities of daily living (IADL)     Decreased  strength      Physician: Lombard, Azikiwe K., MD    Physician Orders: OT Eval and treat  Medical Diagnosis: CVA  Evaluation Date: 7/24/2020  Plan of Care Expiration Period: 10/2/20  Insurance Authorization period Expiration: 9/15/20  Date of Return to MD: TBD  Visit # / Visits Authorized: 9/18  FOTO: 5th visit     Time In: 3:15pm  Time Out: 4:00pm  Total Billable (one on one) Time: 45 minutes     Precautions: Standard and Fall    Subjective     Pt reports: "I had a good weekend"  He was compliant with HEP  Response to previous treatment: positive   Functional change: Improved shoulder movement     Pain: 5/10   Location: generalized     Objective       Bj received therapeutic exercises for 45 minutes including: increased time needed due to fatigue     - UBE Res 3.0 x 10 min with verbal cues for directional changes at 5 min  - Shoulder HEP review 2x20 B UEs   - Green band rows   - Red band Long arm extension   - Yellow band IR/ER   - Side lying Abd   - Red band Add    Home Exercises and Education Provided     Education provided:   - Shoulder HEP  - encouraged him to perform HEP  - Progress towards goals   -importance of scapula for shoulder motion    Written Home Exercises Provided: continue with previously instructed HEP  Exercises were reviewed and Bj was able to demonstrate them prior to the end of the session.  Bj demonstrated good  understanding of the HEP provided.   .   See EMR under Patient Instructions for exercises provided 8/5/20.        Assessment     Bj had good understanding of shoulder HEP. Modifications made to accommodate for bilateral shoulder weakness in " Abduction. Weakness noted still in ER. He does not remember any injuries to the RTC complex. He continues with bilateral shoulder and hand weakness affecting his ADL/IADLs speed.     Bj is progressing well towards his goals and there are no updates to goals at this time. Pt prognosis is Good.     Pt will continue to benefit from skilled outpatient occupational therapy to address the deficits listed in the problem list on initial evaluation provide pt/family education and to maximize pt's level of independence in the home and community environment.     Anticipated barriers to occupational therapy: None    Pt's spiritual, cultural and educational needs considered and pt agreeable to plan of care and goals.    Goals:  Short Term Goals: 5 weeks   - Pt. Will be educated in HEP  MET  - Pt. Will have improved b/l shoulder AROM in FF, Abd, and ext if at least 10 degrees for easier dressing and bathing MET  - Pt. Will report no greater than 4/10 pain over 4 consecutive tx sessions  - Pt. Will have improved b/l  strength of at least 5# for increased functional hand use  - Pt. Will complete fine motor and pinch testing and goals will be established if appropriate MET     Long Term Goals: 10 weeks   - Pt. Will be independent with HEP  - Pt. Will have improve L AROM ER of at least 10 degrees MET  - Pt. Will have improved b/l shoulder AROM in FF, Abd, and ext if at least 15 degrees for easier dressing and bathing  - Pt. Will report improved hand function for home care tasks  - Pt. Will have improved 9 HPT of no greater than 50 sec for improved ability to complete buttoning MET        Plan   Certification Period/Plan of care expiration: 7/24/2020 to 10/2/20.     Outpatient Occupational Therapy 2 times weekly for 10 weeks to include the following interventions: Manual Therapy, Moist Heat/ Ice, Paraffin, Patient Education, Self Care, Therapeutic Activites and Therapeutic Exercise.       Updates/Grading for next session: as  tolerated       DENICE Theodore LOTR

## 2020-09-14 NOTE — PROGRESS NOTES
"  Physical Therapy Daily Treatment Note     Name: Bj Pate Jr.  Clinic Number: [id number]    Therapy Diagnosis:   Encounter Diagnoses   Name Primary?    Decreased strength, endurance, and mobility Yes    Gait abnormality     Balance problem      Physician: Lombard, Azikiwe K., MD    Visit Date: 9/14/2020    Physician Orders: PT Eval and Treat      Medical Diagnosis from Referral:   M1A.09X0 (ICD-10-CM) - Idiopathic chronic gout of multiple sites without tophus   R53.81 (ICD-10-CM) - Physical debility      Evaluation Date: 6/9/2020  Authorization Period Expiration: 6/10/2020  Plan of Care Expiration: 9/9/2020  Visit # / Visits authorized: 3/5 ( 9 total)        PN due:9/25/2020     Time In: 1600  Time Out: 1645  Total Time: 49  Total Billable Time: 49 minutes     Precautions: Standard and Diabetes    Subjective     Pt reports: Feeling like his strength is improving, but still feels limited.    He was compliant with home exercise program.  Response to previous treatment: ongoing  Functional change: ongoing    Pain: 5/10  Location: bilateral shoulder     Pts goals: "I would like to regain my strength, use of my limbs"   Objective       Bj received therapeutic exercises to develop strength for 30 minutes including:    NuStep x 10' Level 3 hills mode     Standing Marches  2x10 c/ 2#  Standing marches 2x10 c/ 2#  Matrix leg press 60# 2x10  Matrix hip abd 50# 2x 10  Matrix hip add 45# 2x 10   Matrix leg extension 50# 2x15  Standing heel raises 1x10   Matrix hamstring curls 55# 2x10      NP:  Pulley Flex x 3'  Pulley PoS x 3'  Standing Rows GTB x 30  Standing Extension GTB x 30  No-Money (Seated) RTB x 30  Standing Palloff Press x 30  Standing hip 4 ways No T-band 2x10   Mini squat 2x10      Matrix rows 40# 3x10  Seated PF against GTB 2x10  Toe taps to 8 in c/ one UE support 2x10  Step ups 4 in step 2x10  Lateral step ups 4 in step 2x10  Sit<>Stand x 30 18" + Ball Lifts   side steps inside parallel bar YTB 3 " laps    Bj participated in neuromuscular re-education activities to improve: Balance for 9 minutes. The following activities were included:    Static Standing on AirEx Feet Together EC 2x30  Static Standing on AirEx Feet Apart EC  2x30  Static standing on airex feet apart c/ head turns 2x30 secs each.  Step ups to foam 2x10 B LE  Standing marching on AirEx 2x1'    NBOS trunk rotation passing ball to PT x 10 (NP)    NP:  Static Standing-Feet Together 2x1' - NP  NP - Stepping over purple noodle x 10 ea LE  Static Standing on BOSU Feet Together x 3' NP  Static Standing on BOSU Feet Apart x 3' NP  Semi-Tandem Standing 2x1' NP  Semi-Tandem Weight Shifting 2 x 30 NP    Bj participated in gait training to improve functional mobility and safety for 10 minutes, including:    +90 ft x2  with SPC, SBA (seated rest break in between)      NP (no time):   +Gait training in // bars with single point cane x 4 laps NP  +Gait training in // bars with small base quad cane x 4 lapsNP  +50 ft with SBQC and S- vc for sequencing and placement of cane NP  Laps around gym x 2 with SBQC and S  - two rest breaks NP  Ambulating outside on concrete x 100 feet c/ SBQC and S NP  Ascending/descending ramp with SBQC and S NP  Curb c/ SBQC and S  NP      Home Exercises Provided and Patient Education Provided     Education provided:   Cont to perform HEP as provided.     Written Home Exercises Provided: Patient instructed to cont prior HEP.  Exercises were reviewed and Bj was able to demonstrate them prior to the end of the session.  Bj demonstrated fair  understanding of the education provided.     See EMR under Patient Instructions for exercises provided 06/09/2020.    Assessment     Pt tolerated session well overall with no complains. Pt was able to progress with standing exercises today with addition of weights and new balance activities. Pt shows improvement with muscular and CV endurance evident with decreased rest breaks,  improved time on Nustep.    Bj is progressing well towards his goals.   Pt prognosis is Good.     Pt will continue to benefit from skilled outpatient physical therapy to address the deficits listed in the problem list box on initial evaluation, provide pt/family education and to maximize pt's level of independence in the home and community environment.     Pt's spiritual, cultural and educational needs considered and pt agreeable to plan of care and goals.    Anticipated barriers to physical therapy: none    Goals:   Short Term Goals: 4 weeks   1. Pt to improve UE strength by > / = 1/2 MMT score in order to improve tolerance to daily activities. Goal In progress  7-  2. Pt to improve B LE strength by > 1/2 MMT score in order to improve functional indepdence. Goal in progress  7-  3. Pt to demonstrate improvement in gait speed to > / = m/s in order to demonstrate improved community ambulation Goal in progress 7-.  4. Pt to improve condition 4 of MSTCIB to > / = 20 seconds in order to improve safety.goal in progress 7-  5. Pt to ambulate x 100 ft without assistive device and with min A in order to demonstrate improved functional independence. Goal in progress  7-     Long Term Goals: 8 weeks   1. Pt to improve UE strength by > / = 1 MMT score in order to improve tolerance to daily activities.  2. Pt to improve B LE strength by > 1 MMT score in order to improve functional indepdence.   3. Pt to demonstrate improvement in gait speed to > / = m/s in order to demonstrate improved community ambulation.  4. Pt to improve condition 4 of MSTCIB to 30 seconds in order to improve safety.  5. Pt to ambulate x 100 ft without assistive device independently in order to demonstrate improved functional independence.    Plan      Plan of care Certification: 6/9/2020 to 9/9/2020.     Outpatient Physical Therapy 2 times weekly for 8 weeks to include the following interventions: Gait Training,  Manual Therapy, Moist Heat/ Ice, Neuromuscular Re-ed, Patient Education, Therapeutic Activites and Therapeutic Exercise.     Cont skilled PT session towards PT and patient's goals.    Lorenzo Capps, PTA    09/14/2020

## 2020-09-16 ENCOUNTER — CLINICAL SUPPORT (OUTPATIENT)
Dept: REHABILITATION | Facility: HOSPITAL | Age: 70
End: 2020-09-16
Attending: FAMILY MEDICINE
Payer: MEDICARE

## 2020-09-16 DIAGNOSIS — M25.611 DECREASED ROM OF RIGHT SHOULDER: ICD-10-CM

## 2020-09-16 DIAGNOSIS — M25.612 DECREASED ROM OF LEFT SHOULDER: Primary | ICD-10-CM

## 2020-09-16 DIAGNOSIS — R29.898 DECREASED GRIP STRENGTH: ICD-10-CM

## 2020-09-16 DIAGNOSIS — R26.89 BALANCE PROBLEM: ICD-10-CM

## 2020-09-16 DIAGNOSIS — R26.9 GAIT ABNORMALITY: ICD-10-CM

## 2020-09-16 DIAGNOSIS — Z78.9 ALTERATION IN INSTRUMENTAL ACTIVITIES OF DAILY LIVING (IADL): ICD-10-CM

## 2020-09-16 DIAGNOSIS — R68.89 DECREASED STRENGTH, ENDURANCE, AND MOBILITY: Primary | ICD-10-CM

## 2020-09-16 DIAGNOSIS — Z74.09 DECREASED STRENGTH, ENDURANCE, AND MOBILITY: Primary | ICD-10-CM

## 2020-09-16 DIAGNOSIS — R53.1 DECREASED STRENGTH, ENDURANCE, AND MOBILITY: Primary | ICD-10-CM

## 2020-09-16 PROCEDURE — 97110 THERAPEUTIC EXERCISES: CPT | Mod: PN

## 2020-09-16 PROCEDURE — 97112 NEUROMUSCULAR REEDUCATION: CPT | Mod: PN

## 2020-09-16 NOTE — PROGRESS NOTES
"  Physical Therapy Daily Treatment Note     Name: Bj Pate Jr.  Clinic Number: [id number]    Therapy Diagnosis:   Encounter Diagnoses   Name Primary?    Decreased strength, endurance, and mobility Yes    Gait abnormality     Balance problem      Physician: Lombard, Azikiwe K., MD    Visit Date: 9/16/2020    Physician Orders: PT Eval and Treat      Medical Diagnosis from Referral:   M1A.09X0 (ICD-10-CM) - Idiopathic chronic gout of multiple sites without tophus   R53.81 (ICD-10-CM) - Physical debility      Evaluation Date: 6/9/2020  Authorization Period Expiration: 6/10/2020  Plan of Care Expiration: 9/9/2020  Visit # / Visits authorized: 4/5 ( 9 total)        PN due:9/25/2020     Time In: 215 pm  Time Out: 300 pm  Total Time: 45  Total Billable Time: 45 minutes     Precautions: Standard and Diabetes    Subjective     Pt reports:Doing well today, no new complaints or reports.     He was compliant with home exercise program.  Response to previous treatment: ongoing  Functional change: ongoing    Pain: 5/10  Location: bilateral shoulder     Pts goals: "I would like to regain my strength, use of my limbs"   Objective     Bj received therapeutic exercises to develop strength for 30 minutes including:    NuStep x 6' Level 3 hills mode     Standing Marches  2x10 c/ 2#  Standing marches 2x10 c/ 2#  Matrix leg press 60# 2x15  Matrix hip abd 50# 2x 10  Matrix hip add 45# 2x 10   Matrix leg extension 50# 2x15  Standing heel raises 1x10   Matrix hamstring curls 55# 2x10  Mini squat 2x10        NP:  Pulley Flex x 3'  Pulley PoS x 3'  Standing Rows GTB x 30  Standing Extension GTB x 30  No-Money (Seated) RTB x 30  Standing Palloff Press x 30  Standing hip 4 ways No T-band 2x10   Matrix rows 40# 3x10  Seated PF against GTB 2x10  Toe taps to 8 in c/ one UE support 2x10  Step ups 4 in step 2x10  Lateral step ups 4 in step 2x10  Sit<>Stand x 30 18" + Ball Lifts   side steps inside parallel bar YTB 3 laps    Bj " participated in neuromuscular re-education activities to improve: Balance for 15 minutes. The following activities were included:    Static Standing on AirEx Feet Together EC 2x30  Static Standing on AirEx Feet Apart EC  2x30  Static standing on airex feet apart c/ head turns 2x30 secs each.  Step ups to foam 2x10 B LE  Standing marching on AirEx 2x1'    NBOS trunk rotation passing ball to PT x 10 (NP)    NP:  Static Standing-Feet Together 2x1' - NP  NP - Stepping over purple noodle x 10 ea LE  Static Standing on BOSU Feet Together x 3' NP  Static Standing on BOSU Feet Apart x 3' NP  Semi-Tandem Standing 2x1' NP  Semi-Tandem Weight Shifting 2 x 30 NP    Bj participated in gait training to improve functional mobility and safety for 00 minutes, includin ft x2  with SPC, SBA (seated rest break in between)      NP (no time):   +Gait training in // bars with single point cane x 4 laps NP  +Gait training in // bars with small base quad cane x 4 lapsNP  +50 ft with SBQC and S- vc for sequencing and placement of cane NP  Laps around gym x 2 with SBQC and S  - two rest breaks NP  Ambulating outside on concrete x 100 feet c/ SBQC and S NP  Ascending/descending ramp with SBQC and S NP  Curb c/ SBQC and S  NP      Home Exercises Provided and Patient Education Provided     Education provided:   Cont to perform HEP as provided.     Written Home Exercises Provided: Patient instructed to cont prior HEP.  Exercises were reviewed and Bj was able to demonstrate them prior to the end of the session.  Bj demonstrated fair  understanding of the education provided.     See EMR under Patient Instructions for exercises provided 2020.    Assessment     Pt tolerated session well overall with no complaints. Pt continues to be adequately challenged with there-ex, requiring 2 seated breaks throughout session. Pt with most difficulty completing standing squats this session, significant anterior tibial shift and forward  lean noted. Improved performance with chair behind pt to cue for increased glut activation. Continue progression of there-ex and endurance training as tolerated.     Bj is progressing well towards his goals.   Pt prognosis is Good.     Pt will continue to benefit from skilled outpatient physical therapy to address the deficits listed in the problem list box on initial evaluation, provide pt/family education and to maximize pt's level of independence in the home and community environment.     Pt's spiritual, cultural and educational needs considered and pt agreeable to plan of care and goals.    Anticipated barriers to physical therapy: none    Goals:   Short Term Goals: 4 weeks   1. Pt to improve UE strength by > / = 1/2 MMT score in order to improve tolerance to daily activities. Goal In progress  7-  2. Pt to improve B LE strength by > 1/2 MMT score in order to improve functional indepdence. Goal in progress  7-  3. Pt to demonstrate improvement in gait speed to > / = m/s in order to demonstrate improved community ambulation Goal in progress 7-.  4. Pt to improve condition 4 of MSTCIB to > / = 20 seconds in order to improve safety.goal in progress 7-  5. Pt to ambulate x 100 ft without assistive device and with min A in order to demonstrate improved functional independence. Goal in progress  7-     Long Term Goals: 8 weeks   1. Pt to improve UE strength by > / = 1 MMT score in order to improve tolerance to daily activities.  2. Pt to improve B LE strength by > 1 MMT score in order to improve functional indepdence.   3. Pt to demonstrate improvement in gait speed to > / = m/s in order to demonstrate improved community ambulation.  4. Pt to improve condition 4 of MSTCIB to 30 seconds in order to improve safety.  5. Pt to ambulate x 100 ft without assistive device independently in order to demonstrate improved functional independence.    Plan      Plan of care Certification:  6/9/2020 to 9/9/2020.     Outpatient Physical Therapy 2 times weekly for 8 weeks to include the following interventions: Gait Training, Manual Therapy, Moist Heat/ Ice, Neuromuscular Re-ed, Patient Education, Therapeutic Activites and Therapeutic Exercise.     Cont skilled PT session towards PT and patient's goals.    Ligia Ibarra, PT    09/16/2020

## 2020-09-16 NOTE — PROGRESS NOTES
"  Occupational Therapy Treatment Note     Date: 9/16/2020  Name: Bj Pate Jr.  Clinic Number: [id number]    Therapy Diagnosis:   Encounter Diagnoses   Name Primary?    Decreased ROM of left shoulder Yes    Decreased ROM of right shoulder     Alteration in instrumental activities of daily living (IADL)     Decreased  strength      Physician: Lombard, Azikiwe K., MD    Physician Orders: OT Eval and treat  Medical Diagnosis: CVA  Evaluation Date: 7/24/2020  Plan of Care Expiration Period: 10/2/20  Insurance Authorization period Expiration: 9/15/20  Date of Return to MD: TBD  Visit # / Visits Authorized: 10/18  FOTO: 5th visit     Time In: 3:15pm  Time Out: 4:00pm  Total Billable (one on one) Time: 45 minutes     Precautions: Standard and Fall    Subjective     Pt reports: "Can we work on my  today?"  He was compliant with HEP  Response to previous treatment: positive   Functional change: Improved shoulder movement     Pain: 5/10   Location: generalized     Objective       Bj received therapeutic exercises for 45 minutes including: increased time needed due to fatigue   - UBE Res 3.0 x 10 min with verbal cues for directional changes at 5 min  - ergo gripper spring on 2nd notch 2x30 B hands  - digit ext red band 2x30 B hands  - 3pt pinch green clothes pin pom pom  to cup at elevated surface, 2# cuff weights on wrists   - Non resistive pulley stretch for scaption x 3 min and Flexion x 3 min      Home Exercises and Education Provided     Education provided:   - Shoulder HEP  - encouraged him to perform HEP  - Progress towards goals   -importance of scapula for shoulder motion    Written Home Exercises Provided: continue with previously instructed HEP  Exercises were reviewed and Bj was able to demonstrate them prior to the end of the session.  Bj demonstrated good  understanding of the HEP provided.   .   See EMR under Patient Instructions for exercises provided 8/5/20.        Assessment "     Bj had fair tolerance to tx this date. Faster shoulder fatigue noted with exercises. L>R. Pain level without change at this time. Fatigue could be secondary to earlier PT appointment prior to OT. He continues with bilateral shoulder and hand weakness affecting his ADL/IADLs speed.     Bj is progressing well towards his goals and there are no updates to goals at this time. Pt prognosis is Good.     Pt will continue to benefit from skilled outpatient occupational therapy to address the deficits listed in the problem list on initial evaluation provide pt/family education and to maximize pt's level of independence in the home and community environment.     Anticipated barriers to occupational therapy: None    Pt's spiritual, cultural and educational needs considered and pt agreeable to plan of care and goals.    Goals:  Short Term Goals: 5 weeks   - Pt. Will be educated in HEP  MET  - Pt. Will have improved b/l shoulder AROM in FF, Abd, and ext if at least 10 degrees for easier dressing and bathing MET  - Pt. Will report no greater than 4/10 pain over 4 consecutive tx sessions  - Pt. Will have improved b/l  strength of at least 5# for increased functional hand use  - Pt. Will complete fine motor and pinch testing and goals will be established if appropriate MET     Long Term Goals: 10 weeks   - Pt. Will be independent with HEP  - Pt. Will have improve L AROM ER of at least 10 degrees MET  - Pt. Will have improved b/l shoulder AROM in FF, Abd, and ext if at least 15 degrees for easier dressing and bathing  - Pt. Will report improved hand function for home care tasks  - Pt. Will have improved 9 HPT of no greater than 50 sec for improved ability to complete buttoning MET        Plan   Certification Period/Plan of care expiration: 7/24/2020 to 10/2/20.     Outpatient Occupational Therapy 2 times weekly for 10 weeks to include the following interventions: Manual Therapy, Moist Heat/ Ice, Paraffin, Patient  Education, Self Care, Therapeutic Activites and Therapeutic Exercise.       Updates/Grading for next session: as tolerated       DENICE Theodore LOTR

## 2020-09-17 DIAGNOSIS — N18.30 TYPE 2 DIABETES MELLITUS WITH STAGE 3 CHRONIC KIDNEY DISEASE, WITHOUT LONG-TERM CURRENT USE OF INSULIN: ICD-10-CM

## 2020-09-17 DIAGNOSIS — E11.22 TYPE 2 DIABETES MELLITUS WITH STAGE 3 CHRONIC KIDNEY DISEASE, WITHOUT LONG-TERM CURRENT USE OF INSULIN: ICD-10-CM

## 2020-09-17 NOTE — TELEPHONE ENCOUNTER
----- Message from Margarita Walker sent at 9/17/2020  3:00 PM CDT -----  Regarding: Refill  Contact: Bj  Type: RX Refill Request    Who Called: Edkacie    Refill or New Rx:Refill    RX Name and Strength:blood sugar diagnostic Strp 200 strip     Preferred Pharmacy with phone number:[facility] #[id number] [place] [address] AT [address] [phone] (Phone)  [phone] (Fax)    Local or Mail Order:Local    Ordering Provider:Lombard    Would the patient rather a call back or a response via [facility]? Call    Best Call Back Number:[phone]

## 2020-09-21 ENCOUNTER — CLINICAL SUPPORT (OUTPATIENT)
Dept: REHABILITATION | Facility: HOSPITAL | Age: 70
End: 2020-09-21
Attending: FAMILY MEDICINE
Payer: MEDICARE

## 2020-09-21 DIAGNOSIS — M25.611 DECREASED ROM OF RIGHT SHOULDER: ICD-10-CM

## 2020-09-21 DIAGNOSIS — R53.1 DECREASED STRENGTH, ENDURANCE, AND MOBILITY: Primary | ICD-10-CM

## 2020-09-21 DIAGNOSIS — R26.9 GAIT ABNORMALITY: ICD-10-CM

## 2020-09-21 DIAGNOSIS — R29.898 DECREASED GRIP STRENGTH: ICD-10-CM

## 2020-09-21 DIAGNOSIS — R68.89 DECREASED STRENGTH, ENDURANCE, AND MOBILITY: Primary | ICD-10-CM

## 2020-09-21 DIAGNOSIS — Z78.9 ALTERATION IN INSTRUMENTAL ACTIVITIES OF DAILY LIVING (IADL): ICD-10-CM

## 2020-09-21 DIAGNOSIS — R26.89 BALANCE PROBLEM: ICD-10-CM

## 2020-09-21 DIAGNOSIS — M25.612 DECREASED ROM OF LEFT SHOULDER: Primary | ICD-10-CM

## 2020-09-21 DIAGNOSIS — Z74.09 DECREASED STRENGTH, ENDURANCE, AND MOBILITY: Primary | ICD-10-CM

## 2020-09-21 PROCEDURE — 97110 THERAPEUTIC EXERCISES: CPT | Mod: PN

## 2020-09-21 PROCEDURE — 97112 NEUROMUSCULAR REEDUCATION: CPT | Mod: PN

## 2020-09-21 PROCEDURE — 97116 GAIT TRAINING THERAPY: CPT | Mod: PN

## 2020-09-21 NOTE — PROGRESS NOTES
"  Physical Therapy Daily Treatment Note     Name: Bj Pate Jr.  Clinic Number: [id number]    Therapy Diagnosis:   Encounter Diagnoses   Name Primary?    Decreased strength, endurance, and mobility Yes    Gait abnormality     Balance problem      Physician: Lombard, Azikiwe K., MD    Visit Date: 9/21/2020    Physician Orders: PT Eval and Treat      Medical Diagnosis from Referral:   M1A.09X0 (ICD-10-CM) - Idiopathic chronic gout of multiple sites without tophus   R53.81 (ICD-10-CM) - Physical debility      Evaluation Date: 6/9/2020  Authorization Period Expiration: 6/10/2020  Plan of Care Expiration: 9/9/2020  Visit # / Visits authorized: 5/5 ( 9 total)        PN due:9/25/2020     Time In: 130 pm  Time Out: 215 pm  Total Time: 45  Total Billable Time: 45 minutes     Precautions: Standard and Diabetes    Subjective     Pt reports: He is feeling well today.     He was compliant with home exercise program.  Response to previous treatment: ongoing  Functional change: ongoing    Pain: 5/10  Location: bilateral shoulder     Pts goals: "I would like to regain my strength, use of my limbs"   Objective     Bj received therapeutic exercises to develop strength for 20 minutes including:    NuStep x 7' Level 6 hills mode     Standing Marches  2x10 c/ 2#  Standing hip abduction 2x10 c/ 2#   Standing hip extension 2x10 c/ 2#  Matrix leg press 60# 2x15  Matrix hip abd 50# 2x 10  Matrix hip add 45# 2x 10   Matrix leg extension 50# 2x15  Standing heel raises 1x10   Matrix hamstring curls 55# 2x10  Mini squat 2x10      NP:  Matrix rows 40# 3x10  Seated PF against GTB 2x10  Toe taps to 8 in c/ one UE support 2x10  Step ups 4 in step 2x10  Lateral step ups 4 in step 2x10  Sit<>Stand x 30 18" + Ball Lifts   side steps inside parallel bar YTB 3 laps    Bj participated in neuromuscular re-education activities to improve: Balance for 15 minutes. The following activities were included:    Static Standing on AirEx Feet Together " EC 2x30  Static Standing on AirEx Feet Apart EC  2x30  Static standing on airex feet apart c/ head turns 2x30 secs each.  Step ups to foam 2x10 B LE  Standing marching on AirEx 2x1'    NBOS trunk rotation passing ball to PT x 10 (NP)    NP:  Static Standing-Feet Together 2x1' - NP  NP - Stepping over purple noodle x 10 ea LE  Static Standing on BOSU Feet Together x 3' NP  Static Standing on BOSU Feet Apart x 3' NP  Semi-Tandem Standing 2x1' NP  Semi-Tandem Weight Shifting 2 x 30 NP    Bj participated in gait training to improve functional mobility and safety for 10 minutes, including:    Lap around gym x 1 with SBQC and S   90 ft x2  with SPC, SBA (seated rest break in between)      NP (no time):   +Gait training in // bars with single point cane x 4 laps NP  +Gait training in // bars with small base quad cane x 4 lapsNP  +50 ft with SBQC and S- vc for sequencing and placement of cane NP    Ambulating outside on concrete x 100 feet c/ SBQC and S NP  Ascending/descending ramp with SBQC and S NP  Curb c/ SBQC and S  NP      Home Exercises Provided and Patient Education Provided     Education provided:   Cont to perform HEP as provided.     Written Home Exercises Provided: Patient instructed to cont prior HEP.  Exercises were reviewed and Bj was able to demonstrate them prior to the end of the session.  Bj demonstrated fair  understanding of the education provided.     See EMR under Patient Instructions for exercises provided 06/09/2020.    Assessment     Pt tolerated session well overall with no complaints. Pt only requiring one seated rest break throughout session today, noted improvement in endurance to activities. Pt with improved performance noted to mini squats, continues to require mod vc for gut activation. Pt remains appropriate for therapy at this time, plan to complete progress note next session.     Bj is progressing well towards his goals.   Pt prognosis is Good.     Pt will continue to  benefit from skilled outpatient physical therapy to address the deficits listed in the problem list box on initial evaluation, provide pt/family education and to maximize pt's level of independence in the home and community environment.     Pt's spiritual, cultural and educational needs considered and pt agreeable to plan of care and goals.    Anticipated barriers to physical therapy: none    Goals:   Short Term Goals: 4 weeks   1. Pt to improve UE strength by > / = 1/2 MMT score in order to improve tolerance to daily activities. Goal In progress  7-  2. Pt to improve B LE strength by > 1/2 MMT score in order to improve functional indepdence. Goal in progress  7-  3. Pt to demonstrate improvement in gait speed to > / = m/s in order to demonstrate improved community ambulation Goal in progress 7-.  4. Pt to improve condition 4 of MSTCIB to > / = 20 seconds in order to improve safety.goal in progress 7-  5. Pt to ambulate x 100 ft without assistive device and with min A in order to demonstrate improved functional independence. Goal in progress  7-     Long Term Goals: 8 weeks   1. Pt to improve UE strength by > / = 1 MMT score in order to improve tolerance to daily activities.  2. Pt to improve B LE strength by > 1 MMT score in order to improve functional indepdence.   3. Pt to demonstrate improvement in gait speed to > / = m/s in order to demonstrate improved community ambulation.  4. Pt to improve condition 4 of MSTCIB to 30 seconds in order to improve safety.  5. Pt to ambulate x 100 ft without assistive device independently in order to demonstrate improved functional independence.    Plan      Plan of care Certification: 6/9/2020 to 9/9/2020.     Outpatient Physical Therapy 2 times weekly for 8 weeks to include the following interventions: Gait Training, Manual Therapy, Moist Heat/ Ice, Neuromuscular Re-ed, Patient Education, Therapeutic Activites and Therapeutic Exercise.      Cont skilled PT session towards PT and patient's goals.    Ligia Ibarra, PT    09/21/2020

## 2020-09-21 NOTE — PROGRESS NOTES
"  Occupational Therapy Treatment/Progress Note     Date: 9/21/2020  Name: Bj Pate Jr.  Clinic Number: [id number]    Therapy Diagnosis:   No diagnosis found.  Physician: Lombard, Azikiwe K., MD    Physician Orders: OT Eval and treat  Medical Diagnosis: CVA  Evaluation Date: 7/24/2020  Plan of Care Expiration Period: 10/2/20  Insurance Authorization period Expiration: 9/15/20  Date of Return to MD: TBD  Visit # / Visits Authorized: 11/18  FOTO: 5th visit     Time In: 2:30pm  Time Out: 3:15pm  Total Billable (one on one) Time: 45 minutes     Precautions: Standard and Fall    Subjective     Pt reports: "I can feel the difference. Especially in my shoulders"  He was compliant with HEP  Response to previous treatment: positive   Functional change: Improved shoulder movement     Pain: 5/10   Location: generalized     Objective       Bj received therapeutic exercises for 45 minutes including: increased time needed due to fatigue   Cognitive Exam:  Oriented: Person, Place, Time and Situation  Behaviors: normal, cooperative  Follows Commands/attention: Follows multistep  commands  Communication: clear/fluent  Memory: No Deficits noted as determined by 3 word recall after 1 minute and 3 minutes  Safety awareness/insight to disability: aware of diagnosis, treatment, and prognosis  Coping skills/emotional control: Appropriate to situation     Visual/Perceptual:     Acuity: corrected by glasses  Convergence: NT  Nystagmus: NT   R/L discrimination: intact  Visual field: intact  Motor Planning Praxis: intact     Comments: n/a     Physical Exam:  Postural examination/scapula alignment: Rounded shoulder and Head forward  Joint integrity: Firm end feeling  Skin integrity: warm/dry  Edema: none noted   Palpation: NT        Joint Evaluation  AROM  7/24/2020 PROM   7/24/2020 AROM  7/24/2020 PROM   7/24/2020 AROM  8/21/20 PROM  8/21 AROM  8/21 PROM  8/21 AROM  9/21 PROM  9/21 AROM  9/21 PROM  9/21     Left Left Right Right Left " Left Right Right Left Left Right Right   Shoulder flex 0-180 85 175 110 175 115 175 135 180 125  WNL   Shoulder Abd 0-180 70 160 130 WNL 80    WNL   Shoulder ER 0-90 20 75 50 WNL 50 75 50 WNL 50 75 50 wnl   Shoulder IR 0-90 Small of back WNL Small of back WNL Small of back WNL Small of back WNL WFL WNL WFL WNL   Shoulder Extension 0-80 60 70 50 80 60 WNL 55 WNL 60 WNL 60 WNL   Shoulder Horizontal adduction 0-90 WFL WNL WFL WNL WFL WNL WFL WNL WFL WNL WFL WNL   Elbow flex/ext 0-150 5/WNL 5/WNL 10/WNL 5/WNL 5/WNL 5/WNL 10/WNL 5/WNL 5/WNL  5/WNL    Wrist flex 0-80 45 60 40 50 45 WNL 40 WNL 50 WNL 40 WNL   Wrist ext 0-70 50 65 60 75 45 WNL 60 WNL 65 WNL 70 WNL   Supination 0-80 WFL WNL 50 WNL WFL WNL WFL WNL WFL WNL WFL WNL   Pronation 0-80 WNL WNL WNL wNL WNL WNL WNL WNL WFL WNL WFL WNL      Fist: normal        Strength 7/24/2020 7/24/2020 8/21/20 8/21/20 9/21 9/21   **within available ROM** Left Right Left Right Left Right   Shoulder flex 3/5 3/5 3+ 3+/5 3+ 3+   Shoulder abd 3/5 3/5 3+ 3+/5 3+ 3+   Shoulder ER 4/5 4/5 4/5 4/5 4 4   Shoulder IR 5/5 5/5 5/5 5/5 5 5   Shoulder Extension 5/5 5/5 5/5 5/5 5 5   Shoulder Horizontal adduction 4/5 4/5 5/5 5/5 5 5   Elbow flex 5/5 5/5 5/5 5/5 5 5   Elbow ext 5/5 5/5 5/5 5/5 5 5   Wrist flex 5/5 5/5 5/5 5/5 5 5   Wrist ext 5/5 5/5 5/5 5/5 5 5   Supination 5/5 5/5 5/5 5/5 5 5   Pronation 5/5 5/5 5/5 5/5 5 5       Strength: (YVETTE Dynamometer in lbs.) Average 3 trials, Position II:       7/24/2020 7/24/2020 8/21/20 8/21/20 9/21 9/21     Left Right Left Right Left Right   Rung II 40.7# 30.5# 41.6 44.1 39.8 37.8       Pinch Strength (Measured in psi)       7/31/20 7/31/20 8/21 8/21 9/21 9/21     Left Right Left Right Left Right   Key Pinch 15 psi 14 psi 16 14 13 13   3pt Pinch 12 psi 8 psi 11 12 10 10   2pt Pinch 11 psi 7 psi 11 9 10 10      Fine Motor Coordination: 9 Hole Peg Test  Left 7/31/20 Right 7/31/20 Left  8/21/20 Right  8/21/20 Left  9/21  Right  9/21   1:01.84  Several drops 1:02.56  2 drops 47.56 43.46 53.80 54.06         Gross motor coordination:   · GURPREET (Rapid Alternating Movements): WFL  · Finger to Nose (5 times): WFL  · Finger Flicks (coordination moving from digit flexion to digit extension): WFL     Tone:  Modified Anselmo Scale:   0 - No increase in muscle tone     Comments: Noted shoulder weakness     Sensation:  Bj  reports normal sensation        Functional Status      Functional Mobility:  Bed mobility: I  Roll to left: I  Roll to right: I  Supine to sit: I  Sit to supine: I  Transfers to bed: I  Transfers to toilet: I  Car transfers: I  Wheelchair mobility: n/a     ADL's:  Feeding: Mod I  Grooming: Mod I  Hygiene: Mod I  UB Dressing: Mod I  LB Dressing: Mod I  Toileting: Mod I  Bathing: Mod I     IADL's:  Homecare: Mod I  Cooking: Mod I  Laundry: I  Yard work: n/a  Use of telephone: I  Money management: I  Medication management: I  Handwriting:I  Technology Use:I           - (NP) UBE Res 3.0 x 10 min with verbal cues for directional changes at 5 min  - ergo gripper spring on 2nd notch 2x30 B hands  - digit ext red band 2x30 B hands  - (NP) 3pt pinch green clothes pin pom pom  to cup at elevated surface, 2# cuff weights on wrists   - Non resistive pulley stretch for scaption x 3 min and Flexion x 3 min      Home Exercises and Education Provided     Education provided:   - Shoulder HEP  - encouraged him to perform HEP  - Progress towards goals   -importance of scapula for shoulder motion    Written Home Exercises Provided: continue with previously instructed HEP  Exercises were reviewed and Bj was able to demonstrate them prior to the end of the session.  Bj demonstrated good  understanding of the HEP provided.   .   See EMR under Patient Instructions for exercises provided 8/5/20.        Assessment     Ed had improved shoulder AROM and PROM. Shoulder strength remains unchanged from last reassessment. , pinch, and  fine motor a bit below last objective measures but could be due to fatigue and arthritis discomfort this date. Overall her is progressing well and remains appropriate for OT.     Bj is progressing well towards his goals and there are no updates to goals at this time. Pt prognosis is Good.     Pt will continue to benefit from skilled outpatient occupational therapy to address the deficits listed in the problem list on initial evaluation provide pt/family education and to maximize pt's level of independence in the home and community environment.     Anticipated barriers to occupational therapy: None    Pt's spiritual, cultural and educational needs considered and pt agreeable to plan of care and goals.    Goals:  Short Term Goals: 5 weeks   - Pt. Will be educated in HEP  MET  - Pt. Will have improved b/l shoulder AROM in FF, Abd, and ext if at least 10 degrees for easier dressing and bathing MET  - Pt. Will report no greater than 4/10 pain over 4 consecutive tx sessions  - Pt. Will have improved b/l  strength of at least 5# for increased functional hand use  - Pt. Will complete fine motor and pinch testing and goals will be established if appropriate MET     Long Term Goals: 10 weeks   - Pt. Will be independent with HEP  - Pt. Will have improve L AROM ER of at least 10 degrees MET  - Pt. Will have improved b/l shoulder AROM in FF, Abd, and ext if at least 15 degrees for easier dressing and bathing  - Pt. Will report improved hand function for home care tasks  - Pt. Will have improved 9 HPT of no greater than 50 sec for improved ability to complete buttoning MET        Plan   Certification Period/Plan of care expiration: 7/24/2020 to 10/2/20.     Outpatient Occupational Therapy 2 times weekly for 10 weeks to include the following interventions: Manual Therapy, Moist Heat/ Ice, Paraffin, Patient Education, Self Care, Therapeutic Activites and Therapeutic Exercise.       Updates/Grading for next session: as  tolerated       DENICE Theodore LOTR

## 2020-09-23 ENCOUNTER — CLINICAL SUPPORT (OUTPATIENT)
Dept: REHABILITATION | Facility: HOSPITAL | Age: 70
End: 2020-09-23
Attending: FAMILY MEDICINE
Payer: MEDICARE

## 2020-09-23 DIAGNOSIS — Z74.09 DECREASED STRENGTH, ENDURANCE, AND MOBILITY: Primary | ICD-10-CM

## 2020-09-23 DIAGNOSIS — Z78.9 ALTERATION IN INSTRUMENTAL ACTIVITIES OF DAILY LIVING (IADL): ICD-10-CM

## 2020-09-23 DIAGNOSIS — R68.89 DECREASED STRENGTH, ENDURANCE, AND MOBILITY: Primary | ICD-10-CM

## 2020-09-23 DIAGNOSIS — R26.89 BALANCE PROBLEM: ICD-10-CM

## 2020-09-23 DIAGNOSIS — Z86.73 HISTORY OF CVA (CEREBROVASCULAR ACCIDENT): ICD-10-CM

## 2020-09-23 DIAGNOSIS — R53.1 DECREASED STRENGTH, ENDURANCE, AND MOBILITY: Primary | ICD-10-CM

## 2020-09-23 DIAGNOSIS — M25.612 DECREASED ROM OF LEFT SHOULDER: Primary | ICD-10-CM

## 2020-09-23 DIAGNOSIS — M25.611 DECREASED ROM OF RIGHT SHOULDER: ICD-10-CM

## 2020-09-23 DIAGNOSIS — R29.898 DECREASED GRIP STRENGTH: ICD-10-CM

## 2020-09-23 DIAGNOSIS — R26.9 GAIT ABNORMALITY: ICD-10-CM

## 2020-09-23 PROCEDURE — 97110 THERAPEUTIC EXERCISES: CPT | Mod: PN

## 2020-09-23 PROCEDURE — 97116 GAIT TRAINING THERAPY: CPT | Mod: PN

## 2020-09-23 NOTE — PROGRESS NOTES
"  Occupational Therapy Treatment Note     Date: 9/23/2020  Name: Bj Pate Jr.  Clinic Number: [id number]    Therapy Diagnosis:   Encounter Diagnoses   Name Primary?    Decreased ROM of left shoulder Yes    Decreased ROM of right shoulder     Alteration in instrumental activities of daily living (IADL)     Decreased  strength      Physician: Lombard, Azikiwe K., MD    Physician Orders: OT Eval and treat  Medical Diagnosis: CVA  Evaluation Date: 7/24/2020  Plan of Care Expiration Period: 10/2/20  Insurance Authorization period Expiration: 9/15/20  Date of Return to MD: TBD  Visit # / Visits Authorized: 12/18  FOTO: 5th visit     Time In: 2:30pm  Time Out: 3:15pm  Total Billable (one on one) Time: 45 minutes     Precautions: Standard and Fall    Subjective     Pt reports: "Doing well. I can feel myself moving better"  He was compliant with HEP  Response to previous treatment: positive   Functional change: Improved shoulder movement     Pain: 5/10   Location: generalized     Objective       Bj received therapeutic exercises for 45 minutes including: increased time needed due to fatigue   -  UBE Res 3.0 x 10 min with verbal cues for directional changes at 5 min  - ergo gripper spring on 2nd notch 2x30 B hands  - digit ext red band 2x30 B hands  - 3pt pinch green clothes pin pom pom  to cup at elevated surface, 2# cuff weights on wrists   - Non resistive pulley stretch for scaption x 3 min and Flexion x 3 min  - seated chest press 25# 5x10  - seated rows 40# 5x10  - (NP) seated FF 3# dowel 5x10  - (NP) sidelying ER  4x10 2# DB B UEs  - (NP) Prone UE ext 1# DB 4x10 B       Home Exercises and Education Provided     Education provided:   - Shoulder HEP  - encouraged him to perform HEP  - Progress towards goals   -importance of scapula for shoulder motion    Written Home Exercises Provided: continue with previously instructed HEP  Exercises were reviewed and Bj was able to demonstrate them prior to " the end of the session.  Bj demonstrated good  understanding of the HEP provided.   .   See EMR under Patient Instructions for exercises provided 8/5/20.        Assessment     Ed continues with good tolerance to tx. UE endurance, strength, and ROM continue to improve. L UE fatigued quicker this date but he was able to complete all exercises with small rest breaks.      Bj is progressing well towards his goals and there are no updates to goals at this time. Pt prognosis is Good.     Pt will continue to benefit from skilled outpatient occupational therapy to address the deficits listed in the problem list on initial evaluation provide pt/family education and to maximize pt's level of independence in the home and community environment.     Anticipated barriers to occupational therapy: None    Pt's spiritual, cultural and educational needs considered and pt agreeable to plan of care and goals.    Goals:  Short Term Goals: 5 weeks   - Pt. Will be educated in HEP  MET  - Pt. Will have improved b/l shoulder AROM in FF, Abd, and ext if at least 10 degrees for easier dressing and bathing MET  - Pt. Will report no greater than 4/10 pain over 4 consecutive tx sessions  - Pt. Will have improved b/l  strength of at least 5# for increased functional hand use  - Pt. Will complete fine motor and pinch testing and goals will be established if appropriate MET     Long Term Goals: 10 weeks   - Pt. Will be independent with HEP  - Pt. Will have improve L AROM ER of at least 10 degrees MET  - Pt. Will have improved b/l shoulder AROM in FF, Abd, and ext if at least 15 degrees for easier dressing and bathing  - Pt. Will report improved hand function for home care tasks  - Pt. Will have improved 9 HPT of no greater than 50 sec for improved ability to complete buttoning MET        Plan   Certification Period/Plan of care expiration: 7/24/2020 to 10/2/20.     Outpatient Occupational Therapy 2 times weekly for 10 weeks to include  the following interventions: Manual Therapy, Moist Heat/ Ice, Paraffin, Patient Education, Self Care, Therapeutic Activites and Therapeutic Exercise.       Updates/Grading for next session: as tolerated       DENICE Theodore LOTR

## 2020-09-23 NOTE — PROGRESS NOTES
"  Physical Therapy Daily Treatment Note     Name: Bj Pate Jr.  Clinic Number: [id number]    Therapy Diagnosis:   Encounter Diagnoses   Name Primary?   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     He was compliant with home exercise program.  Response to previous treatment: ongoing  Functional change: ongoing    Pain: 5/10  Location: bilateral shoulder     Pts goals: "I would like to regain my strength, use of my limbs"   Objective     Lower Extremity Strength    RLE LLE   Hip Flexion: 5/5 5/5   Hip Extension:  NT NT   Hip Abduction: 4/5 4/5   Hip Adduction: 4+/5 4+/5   Knee Extension: 5/5 5/5   Knee Flexion: 4+/5 4+/5   Ankle Dorsiflexion: 5/5 5/5      Observation: unsteadily gait.           Evaluation   Single Limb Stance R LE < 1 sec  (<10 sec = HIGH FALL RISK)   Single Limb Stance L LE < 1 sec  (<10 sec = HIGH FALL RISK)   5 times sit-stand 11 seconds (17 in)  >12 sec= fall risk for general elderly  >16 sec= fall risk for Parkinson's disease  >10 sec= balance/vestibular dysfunction (<61 y/o)  >14.2 sec= balance/vestibular dysfunction (>61 y/o)  >12 sec= fall risk for CVA     Gait Assessment:   - AD used: RW, 2 wheel  - Assistance: mod I  - Distance: 300 ft     GAIT DEVIATIONS:   Decreased step length bilaterally  Forward trunk lean " "throughout gait  Reliance on UE support  Decreased gait speed  Decreased knee flexion throughout swing phase bilaterally     * Above impairments indicate decreased gait safety and increased fall risk.     Impairments contributing to deviations: impaired motor control, decreased endurance, decreased ROM, impaired strength of LEs     Endurance Deficit: yes        Evaluation   Timed Up and Go  20.3 sec (21 + 20 + 20)  < 20 sec safe for independent transfers,     < 30 sec assist required for transfers   Self-Selected Gait Speed 0.54 m/sec (6m/11s)   Fast Gait Speed 0.6 m/sec (6/10s)         Bj received therapeutic exercises to develop strength for 35 minutes including:    NuStep x 7' Level 6 hills mode     Standing Marches  2x10 c/ 2#  Standing hip abduction 2x10 c/ 2#   Standing hip extension 2x10 c/ 2#  Matrix leg press 60# 2x15  Matrix hip abd 50# 2x 10  Matrix hip add 45# 2x 10   Matrix leg extension 50# 2x15  Standing heel raises 1x10   Matrix hamstring curls 55# 2x10  Mini squat 2x10      NP:  Matrix rows 40# 3x10  Seated PF against GTB 2x10  Toe taps to 8 in c/ one UE support 2x10  Step ups 4 in step 2x10  Lateral step ups 4 in step 2x10  Sit<>Stand x 30 18" + Ball Lifts   side steps inside parallel bar YTB 3 laps    Bj participated in neuromuscular re-education activities to improve: Balance for 00 minutes. The following activities were included:    Static Standing on AirEx Feet Together EC 2x30  Static Standing on AirEx Feet Apart EC  2x30  Static standing on airex feet apart c/ head turns 2x30 secs each.  Step ups to foam 2x10 B LE  Standing marching on AirEx 2x1'    NBOS trunk rotation passing ball to PT x 10 (NP)    NP:  Static Standing-Feet Together 2x1' - NP  NP - Stepping over purple noodle x 10 ea LE  Static Standing on BOSU Feet Together x 3' NP  Static Standing on BOSU Feet Apart x 3' NP  Semi-Tandem Standing 2x1' NP  Semi-Tandem Weight Shifting 2 x 30 NP    Bj participated in gait training " to improve functional mobility and safety for 10 minutes, including:    Lap around gym x 1 with SBQC and S   90 ft x2  with SPC, SBA (seated rest break in between)      NP (no time):   +Gait training in // bars with single point cane x 4 laps NP  +Gait training in // bars with small base quad cane x 4 lapsNP  +50 ft with SBQC and S- vc for sequencing and placement of cane NP    Ambulating outside on concrete x 100 feet c/ SBQC and S NP  Ascending/descending ramp with SBQC and S NP  Curb c/ SBQC and S  NP      Home Exercises Provided and Patient Education Provided     Education provided:   Cont to perform HEP as provided.     Written Home Exercises Provided: Patient instructed to cont prior HEP.  Exercises were reviewed and Bj was able to demonstrate them prior to the end of the session.  Bj demonstrated fair  understanding of the education provided.     See EMR under Patient Instructions for exercises provided 06/09/2020.    Assessment     Pt tolerated session well overall with no complaints. Progress note performed this session. Pt with good improvements noted in functional strength as demonstrated by decreased time to complete 5 time sit to stand. Pt also with significant improvements noted in self selected and fast gait speeds with SBQC. Pt also demonstrating decreased time to complete TUG with SBQC. Pt continues with decreased endurance, functional mobility, and fall risk as demonstrated by gait speed and TUG scores. Pt would continue to benefit from skilled PT services in order to address listed deficits.     Bj is progressing well towards his goals.   Pt prognosis is Good.     Pt will continue to benefit from skilled outpatient physical therapy to address the deficits listed in the problem list box on initial evaluation, provide pt/family education and to maximize pt's level of independence in the home and community environment.     Pt's spiritual, cultural and educational needs considered and pt  agreeable to plan of care and goals.    Anticipated barriers to physical therapy: none    Goals:   Short Term Goals: 4 weeks   1. Pt to improve UE strength by > / = 1/2 MMT score in order to improve tolerance to daily activities. Goal In progress  7-  2. Pt to improve B LE strength by > 1/2 MMT score in order to improve functional indepdence. Goal in progress  7-  3. Pt to demonstrate improvement in gait speed to > / = m/s in order to demonstrate improved community ambulation Goal in progress 7-.  4. Pt to improve condition 4 of MSTCIB to > / = 20 seconds in order to improve safety. MET 8/25/2020  5. Pt to ambulate x 100 ft with LRAD* and with min A in order to demonstrate improved functional independence. MET 8/25/2020 - goal modified     Long Term Goals: 8 weeks   1. Pt to improve UE strength by > / = 1 MMT score in order to improve tolerance to daily activities.  2. Pt to improve B LE strength by > 1 MMT score in order to improve functional indepdence.   3. Pt to demonstrate improvement in gait speed to > /  = m/s in order to demonstrate improved community ambulation.  4. Pt to improve condition 4 of MSTCIB to 30 seconds in order to improve safety. MET 8/25/2020  5. Pt to ambulate x 100 ft with LRAD* independently in order to demonstrate improved functional independence. MET 9/23/2020 - goal modified    Plan      Plan of care Certification: 6/9/2020 to 9/9/2020.     Outpatient Physical Therapy 2 times weekly for 8 weeks to include the following interventions: Gait Training, Manual Therapy, Moist Heat/ Ice, Neuromuscular Re-ed, Patient Education, Therapeutic Activites and Therapeutic Exercise.     Cont skilled PT session towards PT and patient's goals.    Ligia Ibarra, PT    09/23/2020

## 2020-09-30 ENCOUNTER — CLINICAL SUPPORT (OUTPATIENT)
Dept: REHABILITATION | Facility: HOSPITAL | Age: 70
End: 2020-09-30
Attending: FAMILY MEDICINE
Payer: MEDICARE

## 2020-09-30 DIAGNOSIS — R53.1 DECREASED STRENGTH, ENDURANCE, AND MOBILITY: Primary | ICD-10-CM

## 2020-09-30 DIAGNOSIS — R68.89 DECREASED STRENGTH, ENDURANCE, AND MOBILITY: Primary | ICD-10-CM

## 2020-09-30 DIAGNOSIS — R26.9 GAIT ABNORMALITY: ICD-10-CM

## 2020-09-30 DIAGNOSIS — Z74.09 DECREASED STRENGTH, ENDURANCE, AND MOBILITY: Primary | ICD-10-CM

## 2020-09-30 DIAGNOSIS — Z78.9 ALTERATION IN INSTRUMENTAL ACTIVITIES OF DAILY LIVING (IADL): ICD-10-CM

## 2020-09-30 DIAGNOSIS — R29.898 DECREASED GRIP STRENGTH: ICD-10-CM

## 2020-09-30 DIAGNOSIS — R26.89 BALANCE PROBLEM: ICD-10-CM

## 2020-09-30 DIAGNOSIS — M25.611 DECREASED ROM OF RIGHT SHOULDER: ICD-10-CM

## 2020-09-30 DIAGNOSIS — M25.612 DECREASED ROM OF LEFT SHOULDER: Primary | ICD-10-CM

## 2020-09-30 PROCEDURE — 97110 THERAPEUTIC EXERCISES: CPT | Mod: PN

## 2020-09-30 PROCEDURE — 97530 THERAPEUTIC ACTIVITIES: CPT | Mod: PN

## 2020-09-30 NOTE — PROGRESS NOTES
"  Physical Therapy Daily Treatment Note     Name: Bj Pate Jr.  Clinic Number: [id number]    Therapy Diagnosis:   Encounter Diagnoses   Name Primary?    Decreased strength, endurance, and mobility Yes    Gait abnormality     Balance problem      Physician: Lombard, Azikiwe K., MD    Visit Date: 9/30/2020    Physician Orders: PT Eval and Treat      Medical Diagnosis from Referral:   M1A.09X0 (ICD-10-CM) - Idiopathic chronic gout of multiple sites without tophus   R53.81 (ICD-10-CM) - Physical debility      Evaluation Date: 6/9/2020  Authorization Period Expiration: 6/10/2020  Plan of Care Expiration: 9/9/2020  Visit # / Visits authorized: 7/20        PN due: 10/23/2020     Time In: 130 pm  Time Out: 215 pm  Total Time: 45  Total Billable Time: 45 minutes     Precautions: Standard and Diabetes    Subjective     Pt reports: Missed appointment Monday secondary to flare up of gout. Feeling better today.    He was compliant with home exercise program.  Response to previous treatment: ongoing  Functional change: ongoing    Pain: 5/10  Location: bilateral shoulder     Pts goals: "I would like to regain my strength, use of my limbs"   Objective      Bj received therapeutic exercises to develop strength for 40 minutes including:    NuStep x 7' Level 6 hills mode     Parallel Bars:  Standing Marches  2x10 c/ 3#  Standing hip abduction 2x10 c/ 3#   Standing hip extension 2x10 c/ 3#  Mini squat 2x10    Step ups 6 in step 2# on B LE 2x10  Lateral step ups 6 in step 2# on B LE 2x10    Matrix Machines:  Matrix leg press 65# 2x15  Matrix hip abd 50# 2x 10  Matrix hip add 45# 2x 10       NP:  Matrix rows 40# 3x10  Seated PF against GTB 2x10  Toe taps to 8 in c/ one UE support 2x10  Sit<>Stand x 30 18" + Ball Lifts   side steps inside parallel bar YTB 3 laps  Matrix leg extension 50# 2x15  Standing heel raises 1x10   Matrix hamstring curls 55# 2x10      Bj participated in neuromuscular re-education activities to improve: " Balance for 00 minutes. The following activities were included:    Static Standing on AirEx Feet Together EC 2x30  Static Standing on AirEx Feet Apart EC  2x30  Static standing on airex feet apart c/ head turns 2x30 secs each.  Step ups to foam 2x10 B LE  Standing marching on AirEx 2x1'    NBOS trunk rotation passing ball to PT x 10 (NP)    Static Standing-Feet Together 2x1' - NP  NP - Stepping over purple noodle x 10 ea LE  Static Standing on BOSU Feet Together x 3' NP  Static Standing on BOSU Feet Apart x 3' NP  Semi-Tandem Standing 2x1' NP  Semi-Tandem Weight Shifting 2 x 30 NP    Bj participated in gait training to improve functional mobility and safety for 5 minutes, including:    Lap around gym x 1 with SBQC and S   90 ft x2  with SPC, SBA (seated rest break in between)      NP (no time):   +Gait training in // bars with single point cane x 4 laps NP  +Gait training in // bars with small base quad cane x 4 lapsNP  +50 ft with SBQC and S- vc for sequencing and placement of cane NP    Ambulating outside on concrete x 100 feet c/ SBQC and S NP  Ascending/descending ramp with SBQC and S NP  Curb c/ SBQC and S  NP      Home Exercises Provided and Patient Education Provided     Education provided:   Cont to perform HEP as provided.     Written Home Exercises Provided: Patient instructed to cont prior HEP.  Exercises were reviewed and Bj was able to demonstrate them prior to the end of the session.  Bj demonstrated fair  understanding of the education provided.     See EMR under Patient Instructions for exercises provided 06/09/2020.    Assessment     Pt tolerated session well overall with no complaints. Increased weight tolerated on standing hip series and step ups this session with appropriate level of muscular fatigue noted. Pt with improved gait quality this session noted with SBQC, less trunk lean and reliance on R UE demonstrated. Pt would continue to benefit from skilled PT services in order to  address decreased endurance, limited functional mobility, and increased fall risk.     Bj is progressing well towards his goals.   Pt prognosis is Good.     Pt will continue to benefit from skilled outpatient physical therapy to address the deficits listed in the problem list box on initial evaluation, provide pt/family education and to maximize pt's level of independence in the home and community environment.     Pt's spiritual, cultural and educational needs considered and pt agreeable to plan of care and goals.    Anticipated barriers to physical therapy: none    Goals:   Short Term Goals: 4 weeks   1. Pt to improve UE strength by > / = 1/2 MMT score in order to improve tolerance to daily activities. Goal In progress  7-  2. Pt to improve B LE strength by > 1/2 MMT score in order to improve functional indepdence. Goal in progress  7-  3. Pt to demonstrate improvement in gait speed to > / = m/s in order to demonstrate improved community ambulation Goal in progress 7-.  4. Pt to improve condition 4 of MSTCIB to > / = 20 seconds in order to improve safety. MET 8/25/2020  5. Pt to ambulate x 100 ft with LRAD* and with min A in order to demonstrate improved functional independence. MET 8/25/2020 - goal modified     Long Term Goals: 8 weeks   1. Pt to improve UE strength by > / = 1 MMT score in order to improve tolerance to daily activities.  2. Pt to improve B LE strength by > 1 MMT score in order to improve functional indepdence.   3. Pt to demonstrate improvement in gait speed to > /  = m/s in order to demonstrate improved community ambulation.  4. Pt to improve condition 4 of MSTCIB to 30 seconds in order to improve safety. MET 8/25/2020  5. Pt to ambulate x 100 ft with LRAD* independently in order to demonstrate improved functional independence. MET 9/23/2020 - goal modified    Plan      Plan of care Certification: 6/9/2020 to 9/9/2020.     Outpatient Physical Therapy 2 times weekly for 8  weeks to include the following interventions: Gait Training, Manual Therapy, Moist Heat/ Ice, Neuromuscular Re-ed, Patient Education, Therapeutic Activites and Therapeutic Exercise.     Cont skilled PT session towards PT and patient's goals.    Ligia Ibarra, PT    09/30/2020

## 2020-09-30 NOTE — PROGRESS NOTES
"  Occupational Therapy Treatment Note     Date: 9/30/2020  Name: Bj Pate Jr.  Clinic Number: [id number]    Therapy Diagnosis:   Encounter Diagnoses   Name Primary?    Decreased ROM of left shoulder Yes    Decreased ROM of right shoulder     Alteration in instrumental activities of daily living (IADL)     Decreased  strength      Physician: Lombard, Azikiwe K., MD    Physician Orders: OT Eval and treat  Medical Diagnosis: CVA  Evaluation Date: 7/24/2020  Plan of Care Expiration Period: 10/2/20  Insurance Authorization period Expiration: 9/15/20  Date of Return to MD: TBD  Visit # / Visits Authorized: 13/18  FOTO: 5th visit     Time In: 2:30pm  Time Out: 3:15pm  Total Billable (one on one) Time: 45 minutes     Precautions: Standard and Fall    Subjective     Pt reports: "My hands are less swollen after the medicine"   He was compliant with HEP  Response to previous treatment: positive   Functional change: Improved shoulder movement     Pain: 5/10   Location: generalized     Objective       Bj received therapeutic exercises for 35 minutes including: increased time needed due to fatigue   -  UBE Res 4.0 x8 min with verbal cues for directional changes at 4 min  - shoulders rolls x10 each direction  - (NP) ergo gripper spring on 2nd notch 2x30 B hands  - (NP) digit ext red band 2x30 B hands  - (NP) 3pt pinch green clothes pin pom pom  to cup at elevated surface, 2# cuff weights on wrists   - Non resistive pulley stretch for scaption x 3 min and Flexion x 3 min  - (NP) seated chest press 25# 5x10  - seated rows 40# 4x10  - seated FF 3# dowel 4x10  - sidelying ER  4x10 2# DB B UEs  - Seated long arm ext 3# free motion 4x10      Bj participated in Therapeutic activities for 10 minutes  - fine motor manipulation B hands   - small wooden pegs and place into peg board, 3 pegs at a time x3 trials B hands   - jingle ball juggle x2 min each hand      Home Exercises and Education Provided     Education " provided:   - Shoulder HEP  - encouraged him to perform HEP  - Progress towards goals   -importance of scapula for shoulder motion    Written Home Exercises Provided: continue with previously instructed HEP  Exercises were reviewed and Bj was able to demonstrate them prior to the end of the session.  Bj demonstrated good  understanding of the HEP provided.   .   See EMR under Patient Instructions for exercises provided 8/5/20.        Assessment     Ed continues with good tolerance to tx. First visit back since gout flair up. Improved AROM grossly in FF B shoulders noted this date.  Verbal cues need to take rest breaks secondary to decreased form during exercises. Mobility continues to improve slowly.      Bj is progressing well towards his goals and there are no updates to goals at this time. Pt prognosis is Good.     Pt will continue to benefit from skilled outpatient occupational therapy to address the deficits listed in the problem list on initial evaluation provide pt/family education and to maximize pt's level of independence in the home and community environment.     Anticipated barriers to occupational therapy: None    Pt's spiritual, cultural and educational needs considered and pt agreeable to plan of care and goals.    Goals:  Short Term Goals: 5 weeks   - Pt. Will be educated in HEP  MET  - Pt. Will have improved b/l shoulder AROM in FF, Abd, and ext if at least 10 degrees for easier dressing and bathing MET  - Pt. Will report no greater than 4/10 pain over 4 consecutive tx sessions  - Pt. Will have improved b/l  strength of at least 5# for increased functional hand use  - Pt. Will complete fine motor and pinch testing and goals will be established if appropriate MET     Long Term Goals: 10 weeks   - Pt. Will be independent with HEP MET  - Pt. Will have improve L AROM ER of at least 10 degrees MET  - Pt. Will have improved b/l shoulder AROM in FF, Abd, and ext if at least 15 degrees for  easier dressing and bathing  - Pt. Will report improved hand function for home care tasks  - Pt. Will have improved 9 HPT of no greater than 50 sec for improved ability to complete buttoning MET        Plan   Certification Period/Plan of care expiration: 7/24/2020 to 10/2/20.     Outpatient Occupational Therapy 2 times weekly for 10 weeks to include the following interventions: Manual Therapy, Moist Heat/ Ice, Paraffin, Patient Education, Self Care, Therapeutic Activites and Therapeutic Exercise.       Updates/Grading for next session: as tolerated       DENICE Theodore

## 2020-10-02 DIAGNOSIS — E11.22 TYPE 2 DIABETES MELLITUS WITH STAGE 3 CHRONIC KIDNEY DISEASE, WITHOUT LONG-TERM CURRENT USE OF INSULIN: ICD-10-CM

## 2020-10-02 DIAGNOSIS — N18.30 TYPE 2 DIABETES MELLITUS WITH STAGE 3 CHRONIC KIDNEY DISEASE, WITHOUT LONG-TERM CURRENT USE OF INSULIN: ICD-10-CM

## 2020-10-02 RX ORDER — LANCETS
EACH MISCELLANEOUS
Qty: 200 EACH | Refills: 3 | Status: SHIPPED | OUTPATIENT
Start: 2020-10-02

## 2020-10-05 ENCOUNTER — PATIENT MESSAGE (OUTPATIENT)
Dept: ADMINISTRATIVE | Facility: HOSPITAL | Age: 70
End: 2020-10-05

## 2020-10-07 ENCOUNTER — CLINICAL SUPPORT (OUTPATIENT)
Dept: REHABILITATION | Facility: HOSPITAL | Age: 70
End: 2020-10-07
Attending: FAMILY MEDICINE
Payer: MEDICARE

## 2020-10-07 DIAGNOSIS — M25.612 DECREASED ROM OF LEFT SHOULDER: Primary | ICD-10-CM

## 2020-10-07 DIAGNOSIS — Z78.9 ALTERATION IN INSTRUMENTAL ACTIVITIES OF DAILY LIVING (IADL): ICD-10-CM

## 2020-10-07 DIAGNOSIS — M25.611 DECREASED ROM OF RIGHT SHOULDER: ICD-10-CM

## 2020-10-07 DIAGNOSIS — R29.898 DECREASED GRIP STRENGTH: ICD-10-CM

## 2020-10-07 PROCEDURE — 97530 THERAPEUTIC ACTIVITIES: CPT | Mod: PN

## 2020-10-07 PROCEDURE — 97110 THERAPEUTIC EXERCISES: CPT | Mod: PN

## 2020-10-07 NOTE — PROGRESS NOTES
"  Occupational Therapy Treatment Note and updated POC     Date: 10/7/2020  Name: Bj Pate Jr.  Clinic Number: [id number]    Therapy Diagnosis:   Encounter Diagnoses   Name Primary?    Decreased ROM of left shoulder Yes    Decreased ROM of right shoulder     Alteration in instrumental activities of daily living (IADL)     Decreased  strength      Physician: Lombard, Azikiwe K., MD    Physician Orders: OT Eval and treat  Medical Diagnosis: CVA  Evaluation Date: 7/24/2020  Plan of Care Expiration Period: 10/2/20 to 12/22/20  Insurance Authorization period Expiration: 9/15/20  Date of Return to MD: TBD  Visit # / Visits Authorized: 14/18  FOTO: 5th visit     Time In: 3:15 pm  Time Out: 4:00 pm  Total Billable (one on one) Time: 45 minutes     Precautions: Standard and Fall    Subjective     Pt reports: "I have a hard time button up my pants, that's why I've been wearing pull up pants"  He was compliant with HEP  Response to previous treatment: positive   Functional change: Improved shoulder movement     Pain: 5/10   Location: generalized     Objective       Bj received therapeutic exercises for 35 minutes including: increased time needed due to fatigue   -  Standing UBE Res 4.0 x8 min with verbal cues for directional changes at 4 min  - shoulders rolls x10 each direction  - ergo gripper spring on 2nd notch 2x30 B hands  - digit ext red band 2x30 B hands  - 3pt pinch blue clothes pin pom pom  to cup at elevated surface, 2# cuff weights on wrists   - Non resistive pulley stretch for scaption x 3 min and Flexion x 3 min  - (NP) seated chest press 25# 5x10  - seated rows 40# 4x10  - (NP) seated FF 3# dowel 4x10  - (NP) sidelying ER  4x10 2# DB B UEs  - (NP) Seated long arm ext 3# free motion 4x10      Bj participated in Therapeutic activities for 10 minutes  - button board x 2 trials       Home Exercises and Education Provided     Education provided:   - Shoulder HEP  - encouraged him to perform " HEP  - Progress towards goals   -importance of scapula for shoulder motion    Written Home Exercises Provided: continue with previously instructed HEP  Exercises were reviewed and Bj was able to demonstrate them prior to the end of the session.  Bj demonstrated good  understanding of the HEP provided.   .   See EMR under Patient Instructions for exercises provided 8/5/20.        Assessment     Ed continues with good tolerance to tx. Graded UBE activity tolerated well but he did require one rest break secondary to fatigue. Pt needed increase of time to button the buttons, but better speed with unbuttoning the buttons. Mobility continues to improve slowly. Plan of care extended for 10 more weeks      Bj is progressing well towards his goals and there are no updates to goals at this time. Pt prognosis is Good.     Pt will continue to benefit from skilled outpatient occupational therapy to address the deficits listed in the problem list on initial evaluation provide pt/family education and to maximize pt's level of independence in the home and community environment.     Anticipated barriers to occupational therapy: None    Pt's spiritual, cultural and educational needs considered and pt agreeable to plan of care and goals.    Goals:  Short Term Goals: 5 weeks   - Pt. Will be educated in HEP  MET  - Pt. Will have improved b/l shoulder AROM in FF, Abd, and ext if at least 10 degrees for easier dressing and bathing MET  - Pt. Will report no greater than 4/10 pain over 4 consecutive tx sessions  - Pt. Will have improved b/l  strength of at least 5# for increased functional hand use  - Pt. Will complete fine motor and pinch testing and goals will be established if appropriate MET     Long Term Goals: 10 weeks   - Pt. Will be independent with HEP MET  - Pt. Will have improve L AROM ER of at least 10 degrees MET  - Pt. Will have improved b/l shoulder AROM in FF, Abd, and ext if at least 15 degrees for easier  dressing and bathing  - Pt. Will report improved hand function for home care tasks  - Pt. Will have improved 9 HPT of no greater than 50 sec for improved ability to complete buttoning MET        Plan   Certification Period/Plan of care expiration: 7/24/2020 to 10/2/20 to 12/22/20     Outpatient Occupational Therapy 2 times weekly for 10 weeks to include the following interventions: Manual Therapy, Moist Heat/ Ice, Paraffin, Patient Education, Self Care, Therapeutic Activites and Therapeutic Exercise.       Updates/Grading for next session: as tolerated       DENICE Theodore

## 2020-10-08 NOTE — PROGRESS NOTES
"  Occupational Therapy Treatment Note and updated POC     Date: 10/9/2020  Name: Bj Pate Jr.  Clinic Number: [id number]    Therapy Diagnosis:   Encounter Diagnoses   Name Primary?    Decreased ROM of left shoulder Yes    Decreased ROM of right shoulder     Alteration in instrumental activities of daily living (IADL)     Decreased  strength      Physician: Lombard, Azikiwe K., MD    Physician Orders: OT Eval and treat  Medical Diagnosis: CVA  Evaluation Date: 7/24/2020  Plan of Care Expiration Period: 10/2/20 to 12/22/20  Insurance Authorization period Expiration: 9/15/20  Date of Return to MD: TBD  Visit # / Visits Authorized: 15/18  FOTO: 5th visit     Time In: 3:15pm  Time Out: 4:00pm  Total Billable (one on one) Time: 45 minutes     Precautions: Standard and Fall    Subjective     Pt reports: "I feel myself getting stronger. I am grateful"   He was compliant with HEP  Response to previous treatment: positive   Functional change: Improved shoulder movement     Pain: 6/10   Location: generalized     Objective     Bj received therapeutic exercises for 30 minutes including: increased time needed due to fatigue   -  Standing UBE Res 4.0 x8 min with verbal cues for directional changes at 4 min  -(NP) shoulders rolls x10 each direction  - ergo gripper spring on 2nd notch 2x30 B hands  - digit ext red band 2x30 B hands  - (NP) 3pt pinch blue clothes pin pom pom  to cup at elevated surface, 2# cuff weights on wrists   - Non resistive pulley stretch for scaption x 3 min and Flexion x 3 min  - (NP) seated chest press 25# 5x10  - seated rows 55# 4x10  - (NP) seated FF 3# dowel 4x10  - (NP) sidelying ER  4x10 2# DB B UEs  - (NP) Seated long arm ext 3# free motion 4x10      Bj participated in Therapeutic activities for 15 minutes  - (NP) Button board x 2 trials   - Putty and Pegs at elevated surface 1x20 B hands each    Home Exercises and Education Provided     Education provided:   - Shoulder HEP  - " encouraged him to perform HEP  - Progress towards goals   -importance of scapula for shoulder motion    Written Home Exercises Provided: continue with previously instructed HEP  Exercises were reviewed and Bj was able to demonstrate them prior to the end of the session.  Bj demonstrated good  understanding of the HEP provided.   .   See EMR under Patient Instructions for exercises provided 8/5/20.        Assessment     Ed had good tolerance with tx this date. UBE tolerated with one rest break secondary to fatigue. Upgraded seated row was completed without significant difficulty. Pt needed verbal cues for maximal shoulder flexion and increased time for peg and putty activity. Arthritis and gout still limiting in several joints but he continues to progress with therapy. Goals remain appropriate at this time.        Bj is progressing well towards his goals and there are no updates to goals at this time. Pt prognosis is Good.     Pt will continue to benefit from skilled outpatient occupational therapy to address the deficits listed in the problem list on initial evaluation provide pt/family education and to maximize pt's level of independence in the home and community environment.     Anticipated barriers to occupational therapy: None    Pt's spiritual, cultural and educational needs considered and pt agreeable to plan of care and goals.    Goals:  Short Term Goals: 5 weeks   - Pt. Will be educated in HEP  MET  - Pt. Will have improved b/l shoulder AROM in FF, Abd, and ext if at least 10 degrees for easier dressing and bathing MET  - Pt. Will report no greater than 4/10 pain over 4 consecutive tx sessions  - Pt. Will have improved b/l  strength of at least 5# for increased functional hand use  - Pt. Will complete fine motor and pinch testing and goals will be established if appropriate MET     Long Term Goals: 10 weeks   - Pt. Will be independent with HEP MET  - Pt. Will have improve L AROM ER of at  least 10 degrees MET  - Pt. Will have improved b/l shoulder AROM in FF, Abd, and ext if at least 15 degrees for easier dressing and bathing  - Pt. Will report improved hand function for home care tasks  - Pt. Will have improved 9 HPT of no greater than 50 sec for improved ability to complete buttoning MET        Plan   Certification Period/Plan of care expiration: 7/24/2020 to 10/2/20 to 12/22/20     Outpatient Occupational Therapy 2 times weekly for 10 weeks to include the following interventions: Manual Therapy, Moist Heat/ Ice, Paraffin, Patient Education, Self Care, Therapeutic Activites and Therapeutic Exercise.       Updates/Grading for next session: as tolerated       DENICE Theodore

## 2020-10-09 ENCOUNTER — CLINICAL SUPPORT (OUTPATIENT)
Dept: REHABILITATION | Facility: HOSPITAL | Age: 70
End: 2020-10-09
Attending: FAMILY MEDICINE
Payer: MEDICARE

## 2020-10-09 DIAGNOSIS — Z78.9 ALTERATION IN INSTRUMENTAL ACTIVITIES OF DAILY LIVING (IADL): ICD-10-CM

## 2020-10-09 DIAGNOSIS — M25.612 DECREASED ROM OF LEFT SHOULDER: Primary | ICD-10-CM

## 2020-10-09 DIAGNOSIS — R29.898 DECREASED GRIP STRENGTH: ICD-10-CM

## 2020-10-09 DIAGNOSIS — M25.611 DECREASED ROM OF RIGHT SHOULDER: ICD-10-CM

## 2020-10-09 PROCEDURE — 97110 THERAPEUTIC EXERCISES: CPT | Mod: PN

## 2020-10-09 PROCEDURE — 97530 THERAPEUTIC ACTIVITIES: CPT | Mod: PN

## 2020-10-14 ENCOUNTER — CLINICAL SUPPORT (OUTPATIENT)
Dept: REHABILITATION | Facility: HOSPITAL | Age: 70
End: 2020-10-14
Attending: FAMILY MEDICINE
Payer: MEDICARE

## 2020-10-14 DIAGNOSIS — R29.898 DECREASED GRIP STRENGTH: ICD-10-CM

## 2020-10-14 DIAGNOSIS — M25.612 DECREASED ROM OF LEFT SHOULDER: Primary | ICD-10-CM

## 2020-10-14 DIAGNOSIS — M25.611 DECREASED ROM OF RIGHT SHOULDER: ICD-10-CM

## 2020-10-14 DIAGNOSIS — Z78.9 ALTERATION IN INSTRUMENTAL ACTIVITIES OF DAILY LIVING (IADL): ICD-10-CM

## 2020-10-14 PROCEDURE — 97110 THERAPEUTIC EXERCISES: CPT | Mod: PN

## 2020-10-14 PROCEDURE — 97530 THERAPEUTIC ACTIVITIES: CPT | Mod: PN

## 2020-10-14 NOTE — PROGRESS NOTES
"  Occupational Therapy Treatment Note     Date: 10/14/2020  Name: Bj Pate Jr.  Clinic Number: [id number]    Therapy Diagnosis:   Encounter Diagnoses   Name Primary?    Decreased ROM of left shoulder Yes    Decreased ROM of right shoulder     Alteration in instrumental activities of daily living (IADL)     Decreased  strength      Physician: Lombard, Azikiwe K., MD    Physician Orders: OT Eval and treat  Medical Diagnosis: CVA  Evaluation Date: 7/24/2020  Plan of Care Expiration Period: 10/2/20 to 12/22/20  Insurance Authorization period Expiration: 9/15/20  Date of Return to MD: TBD  Visit # / Visits Authorized: 16/18  FOTO: 5th visit     Time In: 3:15pm  Time Out: 4:00pm  Total Billable (one on one) Time: 45 minutes     Precautions: Standard and Fall    Subjective     Pt reports: "You're working me today I feel it in my shoulders and arms"  He was compliant with HEP  Response to previous treatment: positive   Functional change: Improved shoulder movement     Pain: 6/10   Location: generalized     Objective     Bj received therapeutic exercises for 25 minutes including: increased time needed due to fatigue   - Standing UBE Res 4.0 x8 min with verbal cues for directional changes at 4 min  - Volleyball Balloon for b/l shoulder dynamic AROM 4x20 reps   - Ergo gripper spring on 2nd notch 2x30 B hands  - Digit ext red band 2x30 B hands  -(NP) shoulders rolls x10 each direction  - (NP) 3pt pinch blue clothes pin pom pom  to cup at elevated surface, 2# cuff weights on wrists   - (NP) Non resistive pulley stretch for scaption x 3 min and Flexion x 3 min  - (NP) seated chest press 25# 5x10  - (NP) seated rows 55# 4x10  - (NP) seated FF 3# dowel 4x10  - (NP) sidelying ER  4x10 2# DB B UEs  - (NP) Seated long arm ext 3# free motion 4x10      Bj participated in Therapeutic activities for 20 minutes  - Vertical [facility] Board- B hands holding 5 bolts at a time  - Button board x 2 trials   - (NP)Putty and " Pegs at elevated surface 1x20 B hands each      Home Exercises and Education Provided     Education provided:   - Shoulder HEP  - encouraged him to perform HEP  - Progress towards goals   -importance of scapula for shoulder motion    Written Home Exercises Provided: continue with previously instructed HEP  Exercises were reviewed and Bj was able to demonstrate them prior to the end of the session.  Bj demonstrated good  understanding of the HEP provided.   .   See EMR under Patient Instructions for exercises provided 8/5/20.        Assessment     Ed had good tolerance with tx this date. UBE tolerated with one rest break at 5 minutes secondary to fatigue. Pt had good bilateral coordination, hand-eye coordination and AROM with balloon volleyball.  Balloon volleyball required a few rest breaks and verbal cues needed to extend elbows. Dynamic ROM of bilateral shoulders still limited but grossly improving.  Noted faster time fastening the buttons. Tactile and verbal cues to stop lateral learn during bolts activity.       Bj is progressing well towards his goals and there are no updates to goals at this time. Pt prognosis is Good.     Pt will continue to benefit from skilled outpatient occupational therapy to address the deficits listed in the problem list on initial evaluation provide pt/family education and to maximize pt's level of independence in the home and community environment.     Anticipated barriers to occupational therapy: None    Pt's spiritual, cultural and educational needs considered and pt agreeable to plan of care and goals.    Goals:  Short Term Goals: 5 weeks   - Pt. Will be educated in HEP  MET  - Pt. Will have improved b/l shoulder AROM in FF, Abd, and ext if at least 10 degrees for easier dressing and bathing MET  - Pt. Will report no greater than 4/10 pain over 4 consecutive tx sessions  - Pt. Will have improved b/l  strength of at least 5# for increased functional hand use  - Pt.  Will complete fine motor and pinch testing and goals will be established if appropriate MET     Long Term Goals: 10 weeks   - Pt. Will be independent with HEP MET  - Pt. Will have improve L AROM ER of at least 10 degrees MET  - Pt. Will have improved b/l shoulder AROM in FF, Abd, and ext if at least 15 degrees for easier dressing and bathing  - Pt. Will report improved hand function for home care tasks  - Pt. Will have improved 9 HPT of no greater than 50 sec for improved ability to complete buttoning MET        Plan   Certification Period/Plan of care expiration: 7/24/2020 to 10/2/20 to 12/22/20     Outpatient Occupational Therapy 2 times weekly for 10 weeks to include the following interventions: Manual Therapy, Moist Heat/ Ice, Paraffin, Patient Education, Self Care, Therapeutic Activites and Therapeutic Exercise.       Updates/Grading for next session: as tolerated       DENICE Theodore

## 2020-10-15 NOTE — PROGRESS NOTES
"  Occupational Therapy Treatment Note     Date: 10/16/2020  Name: Bj Pate Jr.  Clinic Number: [id number]    Therapy Diagnosis:   Encounter Diagnoses   Name Primary?    Decreased ROM of left shoulder Yes    Decreased ROM of right shoulder     Alteration in instrumental activities of daily living (IADL)     Decreased  strength      Physician: Lombard, Azikiwe K., MD    Physician Orders: OT Eval and treat  Medical Diagnosis: CVA  Evaluation Date: 7/24/2020  Plan of Care Expiration Period: 10/2/20 to 12/22/20  Insurance Authorization period Expiration: 9/15/20  Date of Return to MD: TBD  Visit # / Visits Authorized: 17 /18  FOTO: 5th visit     Time In: 4:00 pm  Time Out: 4:45 pm  Total Billable (one on one) Time: 45 minutes     Precautions: Standard and Fall    Subjective     Pt reports: " Nothing has been going on" "last time I couldn't do 5#"   He was compliant with HEP  Response to previous treatment: positive   Functional change: Improved shoulder movement     Pain: 6/10   Location: generalized     Objective     Bj received therapeutic exercises for 45 minutes including: increased time needed due to fatigue   - Standing UBE Res 4.0 x8 min with verbal cues for directional changes at 4 min  - shoulders rolls x10 each direction  - Non resistive pulley stretch for scaption x 3 min and Flexion x 3 min  - seated chest press 25# 5x10  - seated rows 55# 4x10  - supine FF 5# dowel 4x10  - sidelying ER  4x10 1# DB B UEs  - Seated long arm ext 10# free motion 4x10      Bj participated in Therapeutic activities for 0 minutes  - (NP)Vertical [place] Board- B hands holding 5 bolts at a time  - (NP) Button board x 2 trials   - (NP)Putty and Pegs at elevated surface 1x20 B hands each      Home Exercises and Education Provided     Education provided:   - Shoulder HEP  - encouraged him to perform HEP  - Progress towards goals   -importance of scapula for shoulder motion    Written Home Exercises Provided: continue " with previously instructed HEP  Exercises were reviewed and Bj was able to demonstrate them prior to the end of the session.  Bj demonstrated good  understanding of the HEP provided.   .   See EMR under Patient Instructions for exercises provided 8/5/20.        Assessment     Pt had good tolerance with tx this date. Upgraded exercise was tolerated.  Bilateral shoulders grossly improving but still have limitations.Tactile cues for triceps and external rotation exercises to prevent compensatory strategies at shoulder. LUE had less external rotation than RUE. Less rest breaks needed with rows and chest press over previous visits.     Bj is progressing well towards his goals and there are no updates to goals at this time. Pt prognosis is Good.     Pt will continue to benefit from skilled outpatient occupational therapy to address the deficits listed in the problem list on initial evaluation provide pt/family education and to maximize pt's level of independence in the home and community environment.     Anticipated barriers to occupational therapy: None    Pt's spiritual, cultural and educational needs considered and pt agreeable to plan of care and goals.    Goals:  Short Term Goals: 5 weeks   - Pt. Will be educated in HEP  MET  - Pt. Will have improved b/l shoulder AROM in FF, Abd, and ext if at least 10 degrees for easier dressing and bathing MET  - Pt. Will report no greater than 4/10 pain over 4 consecutive tx sessions  - Pt. Will have improved b/l  strength of at least 5# for increased functional hand use  - Pt. Will complete fine motor and pinch testing and goals will be established if appropriate MET     Long Term Goals: 10 weeks   - Pt. Will be independent with HEP MET  - Pt. Will have improve L AROM ER of at least 10 degrees MET  - Pt. Will have improved b/l shoulder AROM in FF, Abd, and ext if at least 15 degrees for easier dressing and bathing  - Pt. Will report improved hand function for home  care tasks  - Pt. Will have improved 9 HPT of no greater than 50 sec for improved ability to complete buttoning MET        Plan   Certification Period/Plan of care expiration: 7/24/2020 to 10/2/20 to 12/22/20     Outpatient Occupational Therapy 2 times weekly for 10 weeks to include the following interventions: Manual Therapy, Moist Heat/ Ice, Paraffin, Patient Education, Self Care, Therapeutic Activites and Therapeutic Exercise.       Updates/Grading for next session: as tolerated       DENICE Theodore

## 2020-10-16 ENCOUNTER — CLINICAL SUPPORT (OUTPATIENT)
Dept: REHABILITATION | Facility: HOSPITAL | Age: 70
End: 2020-10-16
Attending: FAMILY MEDICINE
Payer: MEDICARE

## 2020-10-16 DIAGNOSIS — Z78.9 ALTERATION IN INSTRUMENTAL ACTIVITIES OF DAILY LIVING (IADL): ICD-10-CM

## 2020-10-16 DIAGNOSIS — R29.898 DECREASED GRIP STRENGTH: ICD-10-CM

## 2020-10-16 DIAGNOSIS — R53.1 DECREASED STRENGTH, ENDURANCE, AND MOBILITY: Primary | ICD-10-CM

## 2020-10-16 DIAGNOSIS — M25.612 DECREASED ROM OF LEFT SHOULDER: Primary | ICD-10-CM

## 2020-10-16 DIAGNOSIS — R68.89 DECREASED STRENGTH, ENDURANCE, AND MOBILITY: Primary | ICD-10-CM

## 2020-10-16 DIAGNOSIS — R26.89 BALANCE PROBLEM: ICD-10-CM

## 2020-10-16 DIAGNOSIS — R26.9 GAIT ABNORMALITY: ICD-10-CM

## 2020-10-16 DIAGNOSIS — M25.611 DECREASED ROM OF RIGHT SHOULDER: ICD-10-CM

## 2020-10-16 DIAGNOSIS — Z74.09 DECREASED STRENGTH, ENDURANCE, AND MOBILITY: Primary | ICD-10-CM

## 2020-10-16 PROCEDURE — 97110 THERAPEUTIC EXERCISES: CPT | Mod: PN,CQ

## 2020-10-16 PROCEDURE — 97112 NEUROMUSCULAR REEDUCATION: CPT | Mod: PN,CQ

## 2020-10-16 PROCEDURE — 97110 THERAPEUTIC EXERCISES: CPT | Mod: PN

## 2020-10-16 NOTE — PROGRESS NOTES
"  Physical Therapy Daily Treatment Note     Name: Bj Pate Jr.  Clinic Number: [id number]    Therapy Diagnosis:   Encounter Diagnoses   Name Primary?   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    He was compliant with home exercise program.  Response to previous treatment: ongoing  Functional change: ongoing    Pain: 6/10  Location: Low back and shoulders     Objective      Bj received therapeutic exercises to develop strength for 15 minutes including:    NuStep Level 6 hills mode    7 min    Parallel Bars:  Standing Marches  2#   10x  Standing hip abduction   2#   10x  Standing hip extension   2#   10x   Mini squat      2x10    Step ups 4 in step 0# on B LE   10x  Lateral step ups 6 in step 0# on B LE  2x10    Matrix Machines:  Matrix leg press 65#     2x15  Matrix hip abd 50#     2x10  Matrix hip add 45#     2x10       NP:  Matrix rows 40# 3x10  Seated PF against GTB 2x10  Toe taps to 8 in c/ one UE support 2x10  Sit<>Stand x 30 18" + Ball Lifts   side steps inside parallel bar YTB 3 laps  Matrix leg extension 50# 2x15  Standing heel raises 1x10   Matrix hamstring curls 55# 2x10      Bj participated in neuromuscular re-education activities to improve: Balance for 30 minutes. The following activities were included:    Static Standing on " AirEx Feet Together EO  2x30  Static Standing on AirEx Feet Apart EC    2x30  Static standing on airex feet apart c/ head turns 2x30 secs each.  Step ups to foam  B LE    2x10  Standing marching w/ bar in // bars  1 min    NBOS trunk rotation passing ball to PT x 10 (NP)    Static Standing-Feet Together 2x1' - NP  NP - Stepping over purple noodle x 10 ea LE  Static Standing on BOSU Feet Together x 3' NP  Static Standing on BOSU Feet Apart x 3' NP  Semi-Tandem Standing 2x1' NP  Semi-Tandem Weight Shifting 2 x 30 NP    Bj participated in gait training to improve functional mobility and safety for 5 minutes, including:    Lap around gym x 1 with SBQC    90 ft x2  with SPC, SBA (seated rest break in between)      NP (no time):   +Gait training in // bars with single point cane x 4 laps NP  +Gait training in // bars with small base quad cane x 4 lapsNP  +50 ft with SBQC and S- vc for sequencing and placement of cane NP    Ambulating outside on concrete x 100 feet c/ SBQC and S NP  Ascending/descending ramp with SBQC and S NP  Curb c/ SBQC and S  NP      Home Exercises Provided and Patient Education Provided     Education provided:   Cont to perform HEP as provided.     Written Home Exercises Provided: Patient instructed to cont prior HEP.  Exercises were reviewed and Bj was able to demonstrate them prior to the end of the session.  Bj demonstrated fair  understanding of the education provided.     See EMR under Patient Instructions for exercises provided 06/09/2020.    Assessment     Pt tolerated today's session well today without any complaints of pain, however pt was notably fatigued throughout. Frequent rest breaks were required between task. Standing marching with bar was added to aid in facilitation of reciprocal arm swing while walking, especially with LLE. While amb with SBQC, patient continues to display trunk flexion, decreased arm swing, and decreased pérez. Pt would continue to benefit from  skilled PT services in order to address decreased endurance, limited functional mobility, and increased fall risk.     Bj is progressing well towards his goals.   Pt prognosis is Good.     Pt will continue to benefit from skilled outpatient physical therapy to address the deficits listed in the problem list box on initial evaluation, provide pt/family education and to maximize pt's level of independence in the home and community environment.     Pt's spiritual, cultural and educational needs considered and pt agreeable to plan of care and goals.    Anticipated barriers to physical therapy: none    Goals:   Short Term Goals: 4 weeks   1. Pt to improve UE strength by > / = 1/2 MMT score in order to improve tolerance to daily activities. Goal In progress  7-  2. Pt to improve B LE strength by > 1/2 MMT score in order to improve functional indepdence. Goal in progress  7-  3. Pt to demonstrate improvement in gait speed to > / = m/s in order to demonstrate improved community ambulation Goal in progress 7-.  4. Pt to improve condition 4 of MSTCIB to > / = 20 seconds in order to improve safety. MET 8/25/2020  5. Pt to ambulate x 100 ft with LRAD* and with min A in order to demonstrate improved functional independence. MET 8/25/2020 - goal modified     Long Term Goals: 8 weeks   1. Pt to improve UE strength by > / = 1 MMT score in order to improve tolerance to daily activities.  2. Pt to improve B LE strength by > 1 MMT score in order to improve functional indepdence.   3. Pt to demonstrate improvement in gait speed to > /  = m/s in order to demonstrate improved community ambulation.  4. Pt to improve condition 4 of MSTCIB to 30 seconds in order to improve safety. MET 8/25/2020  5. Pt to ambulate x 100 ft with LRAD* independently in order to demonstrate improved functional independence. MET 9/23/2020 - goal modified    Plan      Plan of care Certification: 6/9/2020 to 9/9/2020.     Outpatient  Physical Therapy 2 times weekly for 8 weeks to include the following interventions: Gait Training, Manual Therapy, Moist Heat/ Ice, Neuromuscular Re-ed, Patient Education, Therapeutic Activites and Therapeutic Exercise.     Cont skilled PT session towards PT and patient's goals.    Carolyn Engel PTA    Co-signed: Franny Leonard DPT  10/16/2020

## 2020-10-27 ENCOUNTER — CLINICAL SUPPORT (OUTPATIENT)
Dept: REHABILITATION | Facility: HOSPITAL | Age: 70
End: 2020-10-27
Attending: FAMILY MEDICINE
Payer: MEDICARE

## 2020-10-27 DIAGNOSIS — R26.89 BALANCE PROBLEM: ICD-10-CM

## 2020-10-27 DIAGNOSIS — R53.1 DECREASED STRENGTH, ENDURANCE, AND MOBILITY: ICD-10-CM

## 2020-10-27 DIAGNOSIS — R68.89 DECREASED STRENGTH, ENDURANCE, AND MOBILITY: ICD-10-CM

## 2020-10-27 DIAGNOSIS — R26.9 GAIT ABNORMALITY: ICD-10-CM

## 2020-10-27 DIAGNOSIS — Z74.09 DECREASED STRENGTH, ENDURANCE, AND MOBILITY: ICD-10-CM

## 2020-10-27 PROCEDURE — 97110 THERAPEUTIC EXERCISES: CPT | Mod: PN,CQ

## 2020-10-27 NOTE — PROGRESS NOTES
"  Physical Therapy Daily Treatment Note     Name: Bj Pate Jr.  Clinic Number: [id number]    Therapy Diagnosis:   Encounter Diagnoses   Name Primary?   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 He is doing better now. He has pain everyday but therapy is helping him tremendously.     He was compliant with home exercise program.  Response to previous treatment: ongoing  Functional change: ongoing    Pain: 7/10  Location: Low back and shoulders     Objective      Bj received therapeutic exercises to develop strength for 45 minutes including:    NuStep Level 6 hills mode    7 min    Parallel Bars:  Standing Marches  2#   10x  Standing hip abduction   2#   10x  Standing hip extension   2#   10x   Mini squat      2x10    Step ups 4 in step 0# on B LE   10x  Lateral step ups 6 in step 0# on B LE  2x10    Matrix Machines:  Matrix leg press 55#    2x10  Matrix hip abd 50#     2x10  Matrix hip add 45#     2x10   Matrix hamstring curls 55#                          2x10  Matrix leg extension 50#    2x10  Matrix Rows 55#    2x10    NP:  Seated PF against GTB 2x10  Toe taps to 8 in c/ one UE support 2x10  Sit<>Stand x 30 18" + Ball Lifts   side steps inside parallel bar YTB 3 laps    Standing heel raises 1x10         Bj participated in neuromuscular " re-education activities to improve: Balance for 00 minutes. The following activities were included:    Static Standing on AirEx Feet Together EO  2x30  Static Standing on AirEx Feet Apart EC    2x30  Static standing on airex feet apart c/ head turns 2x30 secs each.  Step ups to foam  B LE    2x10  Standing marching w/ bar in // bars  1 min    NBOS trunk rotation passing ball to PT x 10 (NP)    Static Standing-Feet Together 2x1' - NP  NP - Stepping over purple noodle x 10 ea LE  Static Standing on BOSU Feet Together x 3' NP  Static Standing on BOSU Feet Apart x 3' NP  Semi-Tandem Standing 2x1' NP  Semi-Tandem Weight Shifting 2 x 30 NP    Bj participated in gait training to improve functional mobility and safety for 5 minutes, including:    Lap around gym x 1 with SBQC    90 ft x2  with SPC, SBA (seated rest break in between)      NP (no time):   +Gait training in // bars with single point cane x 4 laps NP  +Gait training in // bars with small base quad cane x 4 lapsNP  +50 ft with SBQC and S- vc for sequencing and placement of cane NP    Ambulating outside on concrete x 100 feet c/ SBQC and S NP  Ascending/descending ramp with SBQC and S NP  Curb c/ SBQC and S  NP      Home Exercises Provided and Patient Education Provided     Education provided:   Cont to perform HEP as provided.     Written Home Exercises Provided: Patient instructed to cont prior HEP.  Exercises were reviewed and Bj was able to demonstrate them prior to the end of the session.  Bj demonstrated fair  understanding of the education provided.     See EMR under Patient Instructions for exercises provided 06/09/2020.    Assessment     Pt tolerated today's session well. Had to miss therapy last week 2' food poisoning. Frequent rest breaks were required between task. Today's session placed main emphasis on global strengthening and muscle endurance (LE, UE and core strengthening).  While amb with SBQC, patient continues to display trunk  flexion, decreased arm swing, and decreased pérez. Pt would continue to benefit from skilled PT services in order to address decreased endurance, limited functional mobility, and increased fall risk.     Bj is progressing well towards his goals.   Pt prognosis is Good.     Pt will continue to benefit from skilled outpatient physical therapy to address the deficits listed in the problem list box on initial evaluation, provide pt/family education and to maximize pt's level of independence in the home and community environment.     Pt's spiritual, cultural and educational needs considered and pt agreeable to plan of care and goals.    Anticipated barriers to physical therapy: none    Goals:   Short Term Goals: 4 weeks   1. Pt to improve UE strength by > / = 1/2 MMT score in order to improve tolerance to daily activities. Goal In progress  7-  2. Pt to improve B LE strength by > 1/2 MMT score in order to improve functional indepdence. Goal in progress  7-  3. Pt to demonstrate improvement in gait speed to > / = m/s in order to demonstrate improved community ambulation Goal in progress 7-.  4. Pt to improve condition 4 of MSTCIB to > / = 20 seconds in order to improve safety. MET 8/25/2020  5. Pt to ambulate x 100 ft with LRAD* and with min A in order to demonstrate improved functional independence. MET 8/25/2020 - goal modified     Long Term Goals: 8 weeks   1. Pt to improve UE strength by > / = 1 MMT score in order to improve tolerance to daily activities.  2. Pt to improve B LE strength by > 1 MMT score in order to improve functional indepdence.   3. Pt to demonstrate improvement in gait speed to > /  = m/s in order to demonstrate improved community ambulation.  4. Pt to improve condition 4 of MSTCIB to 30 seconds in order to improve safety. MET 8/25/2020  5. Pt to ambulate x 100 ft with LRAD* independently in order to demonstrate improved functional independence. MET 9/23/2020 - goal  modified    Plan      Plan of care Certification: 6/9/2020 to 9/9/2020.     Outpatient Physical Therapy 2 times weekly for 8 weeks to include the following interventions: Gait Training, Manual Therapy, Moist Heat/ Ice, Neuromuscular Re-ed, Patient Education, Therapeutic Activites and Therapeutic Exercise.     Cont skilled PT session towards PT and patient's goals.    Lexie Woods, PTA      10/27/2020

## 2020-10-28 ENCOUNTER — TELEPHONE (OUTPATIENT)
Dept: FAMILY MEDICINE | Facility: CLINIC | Age: 70
End: 2020-10-28

## 2020-10-28 NOTE — TELEPHONE ENCOUNTER
----- Message from Margarita Hargrove sent at 10/27/2020 12:33 PM CDT -----  Regarding: self [phone]  .Type: Patient Call Back    Who called: self    What is the request in detail: In regards to medication that was prescribed furosemide (LASIX) 40 MG tablet. Pt was told to stop taking medication but legs are starting to swell and would like to know if he should continue taking medication again    Can the clinic reply by [facility]? call back    Would the patient rather a call back or a response via [facility]? Call back    Best call back number: [phone]

## 2020-10-28 NOTE — TELEPHONE ENCOUNTER
----- Message from Chanel Pulido sent at 10/28/2020 11:05 AM CDT -----  Regarding: Return Call  Contact: Patient  Type: Patient Call Back    Who called: Patient    What is the request in detail: He is returning a call.    Can the clinic reply by [facility]? No    Would the patient rather a call back or a response via [facility]? Call    Best call back number: [phone] (home)     Additional Information: n/a    Thanks

## 2020-11-05 DIAGNOSIS — E11.22 TYPE 2 DIABETES MELLITUS WITH STAGE 3 CHRONIC KIDNEY DISEASE, WITHOUT LONG-TERM CURRENT USE OF INSULIN: ICD-10-CM

## 2020-11-05 DIAGNOSIS — N18.30 TYPE 2 DIABETES MELLITUS WITH STAGE 3 CHRONIC KIDNEY DISEASE, WITHOUT LONG-TERM CURRENT USE OF INSULIN: ICD-10-CM

## 2020-11-05 NOTE — TELEPHONE ENCOUNTER
----- Message from Kadie Nichole sent at 11/5/2020 11:46 AM CST -----  Type: RX Refill Request    Who Called: Self     Have you contacted your pharmacy: yes    Refill or New Rx: refill     RX Name and Strength: blood sugar diagnostic Strp    Is this a 30 day or 90 day RX: 90 day     Preferred Pharmacy with phone number: [id number] [address] [phone] (Phone)  [phone] (Fax)    Local or Mail Order: local     Would the patient rather a call back or a response via [facility]? Call back     Best Call Back Number: [phone]

## 2020-11-06 ENCOUNTER — CLINICAL SUPPORT (OUTPATIENT)
Dept: REHABILITATION | Facility: HOSPITAL | Age: 70
End: 2020-11-06
Attending: FAMILY MEDICINE
Payer: MEDICARE

## 2020-11-06 ENCOUNTER — TELEPHONE (OUTPATIENT)
Dept: FAMILY MEDICINE | Facility: CLINIC | Age: 70
End: 2020-11-06

## 2020-11-06 DIAGNOSIS — R53.1 DECREASED STRENGTH, ENDURANCE, AND MOBILITY: Primary | ICD-10-CM

## 2020-11-06 DIAGNOSIS — M25.611 DECREASED ROM OF RIGHT SHOULDER: ICD-10-CM

## 2020-11-06 DIAGNOSIS — Z74.09 DECREASED STRENGTH, ENDURANCE, AND MOBILITY: Primary | ICD-10-CM

## 2020-11-06 DIAGNOSIS — G47.33 OBSTRUCTIVE SLEEP APNEA ON CPAP: Primary | ICD-10-CM

## 2020-11-06 DIAGNOSIS — R68.89 DECREASED STRENGTH, ENDURANCE, AND MOBILITY: Primary | ICD-10-CM

## 2020-11-06 DIAGNOSIS — R26.89 BALANCE PROBLEM: ICD-10-CM

## 2020-11-06 DIAGNOSIS — R29.898 DECREASED GRIP STRENGTH: ICD-10-CM

## 2020-11-06 DIAGNOSIS — R26.9 GAIT ABNORMALITY: ICD-10-CM

## 2020-11-06 DIAGNOSIS — Z78.9 ALTERATION IN INSTRUMENTAL ACTIVITIES OF DAILY LIVING (IADL): ICD-10-CM

## 2020-11-06 DIAGNOSIS — M25.612 DECREASED ROM OF LEFT SHOULDER: Primary | ICD-10-CM

## 2020-11-06 PROCEDURE — 97110 THERAPEUTIC EXERCISES: CPT | Mod: PN

## 2020-11-06 NOTE — PROGRESS NOTES
Physical Therapy Daily Treatment Note     Name: Bj Pate Jr.  Clinic Number: [id number]    Therapy Diagnosis:   Encounter Diagnoses   Name Primary?    Decreased strength, endurance, and mobility Yes    Gait abnormality     Balance problem      Physician: Lombard, Azikiwe K., MD    Visit Date: 11/6/2020    Physician Orders: PT Eval and Treat      Medical Diagnosis from Referral:   M1A.09X0 (ICD-10-CM) - Idiopathic chronic gout of multiple sites without tophus   R53.81 (ICD-10-CM) - Physical debility      Evaluation Date: 6/9/2020  Authorization Period Expiration: 6/10/2020  Plan of Care Expiration: 9/9/2020 (EXTEND TO 12/6/2020)  Visit # / Visits authorized: 10/20        PN due: 12/6/2020    Time In: 1300   Time Out: 1345  Total Time: 45 minutes  Total Billable Time: 45 minutes     Precautions: Standard and Diabetes    Subjective     Pt reports: he is doing well today, no new complaints or reports. Feels like he has improved a lot since beginning therapy.     He was compliant with home exercise program.  Response to previous treatment: ongoing  Functional change: ongoing    Pain: 7/10  Location: Low back and shoulders     Objective      Lower Extremity Strength    RLE LLE   Hip Flexion: 5/5 5/5   Hip Extension:  NT NT   Hip Abduction: 4/5 4/5   Hip Adduction: 4+/5 4+/5   Knee Extension: 5/5 5/5   Knee Flexion: 4+/5 4+/5   Ankle Dorsiflexion: 5/5 5/5      Observation: unsteadily gait.           Evaluation   Single Limb Stance R LE < 1 sec  (<10 sec = HIGH FALL RISK)   Single Limb Stance L LE < 1 sec  (<10 sec = HIGH FALL RISK)   5 times sit-stand 11 seconds (17 in)  >12 sec= fall risk for general elderly  >16 sec= fall risk for Parkinson's disease  >10 sec= balance/vestibular dysfunction (<61 y/o)  >14.2 sec= balance/vestibular dysfunction (>61 y/o)  >12 sec= fall risk for CVA      Gait Assessment:   - AD used: RW, 2 wheel  - Assistance: mod I  - Distance: 300 ft     GAIT DEVIATIONS:   Decreased step length  "bilaterally  Forward trunk lean throughout gait  Reliance on UE support  Decreased gait speed  Decreased knee flexion throughout swing phase bilaterally     * Above impairments indicate decreased gait safety and increased fall risk.     Impairments contributing to deviations: impaired motor control, decreased endurance, decreased ROM, impaired strength of LEs     Endurance Deficit: yes        9/23/20 11/6/20   Timed Up and Go  20.3 sec (21 + 20 + 20)  < 20 sec safe for independent transfers,     < 30 sec assist required for transfers 22.3 (23+22+22)   Self-Selected Gait Speed 0.54 m/sec (6m/11s) 0.46 m/s (6/13 sec)   Fast Gait Speed 0.6 m/sec (6/10s) 0.46 m/s (6/13 sec)        Bj received therapeutic exercises to develop strength for 40 minutes including:    NuStep Level 6 hills mode    7 min    Parallel Bars:  Standing Marches  2#   10x  Standing hip abduction   2#   10x  Standing hip extension   2#   10x   Mini squat      2x10    Step ups 4 in step 0# on B LE   10x  Lateral step ups 6 in step 0# on B LE  2x10    Matrix Machines:  Matrix leg press 55#    2x10  Matrix hip abd +55#     2x10  Matrix hip add +45#     2x10   Matrix hamstring curls 55#                          2x10  Matrix leg extension 50#    2x10  Matrix Rows 55#    2x10    NP:  Seated PF against GTB 2x10  Toe taps to 8 in c/ one UE support 2x10  Sit<>Stand x 30 18" + Ball Lifts   side steps inside parallel bar YTB 3 laps    Standing heel raises 1x10     Bj participated in neuromuscular re-education activities to improve: Balance for 00 minutes. The following activities were included:    Static Standing on AirEx Feet Together EO  2x30  Static Standing on AirEx Feet Apart EC    2x30  Static standing on airex feet apart c/ head turns 2x30 secs each.  Step ups to foam  B LE    2x10  Standing marching w/ bar in // bars  1 min    NBOS trunk rotation passing ball to PT x 10 (NP)    Static Standing-Feet Together 2x1' - NP  NP - Stepping over purple " noodle x 10 ea LE  Static Standing on BOSU Feet Together x 3' NP  Static Standing on BOSU Feet Apart x 3' NP  Semi-Tandem Standing 2x1' NP  Semi-Tandem Weight Shifting 2 x 30 NP    Bj participated in gait training to improve functional mobility and safety for 5 minutes, including:    Lap around gym x 1 with SBQC    90 ft x2  with SPC, SBA (seated rest break in between)      NP (no time):   +Gait training in // bars with single point cane x 4 laps NP  +Gait training in // bars with small base quad cane x 4 lapsNP  +50 ft with SBQC and S- vc for sequencing and placement of cane NP    Ambulating outside on concrete x 100 feet c/ SBQC and S NP  Ascending/descending ramp with SBQC and S NP  Curb c/ SBQC and S  NP      Home Exercises Provided and Patient Education Provided     Education provided:   Cont to perform HEP as provided.     Written Home Exercises Provided: Patient instructed to cont prior HEP.  Exercises were reviewed and Bj was able to demonstrate them prior to the end of the session.  Bj demonstrated fair  understanding of the education provided.     See EMR under Patient Instructions for exercises provided 06/09/2020.    Assessment     Pt tolerated today's session well. Pt progress note performed this session. Pt with slight decline noted today in TUG score and SS, Fast Gait speeds. Pt with no significant changes noted in five time sit to stand score. However, pt with some improvements noted in B LE strength this session. Decline in some scores possibly due to more apparent fatigue this session. Pt with noted improvement in quality of gait this session. Pt with larger step length B and decreased forward trunk lean. Pt ambulating throughout clinic and now in community with SBQC. Pt has made significant improvements in gait, strength, and functional mobility with therapy thus far with slight plateau over last few weeks. Plan to continue therapy for 1 month to assess further progress at this time  and plan for discharge if continuing to plateau. Pt remains appropriate for therapy at this time. Extend POC to 12/6/20 in order to address listed deficits and progress toward independence.  Goals addressed/updated and remain appropriate at this time.    Bj is progressing well towards his goals.   Pt prognosis is Good.     Pt will continue to benefit from skilled outpatient physical therapy to address the deficits listed in the problem list box on initial evaluation, provide pt/family education and to maximize pt's level of independence in the home and community environment.     Pt's spiritual, cultural and educational needs considered and pt agreeable to plan of care and goals.    Anticipated barriers to physical therapy: none    Goals:   Short Term Goals: 4 weeks   1. Pt to improve UE strength by > / = 1/2 MMT score in order to improve tolerance to daily activities. Goal In progress  7-  2. Pt to improve B LE strength by > 1/2 MMT score in order to improve functional indepdence. Goal in progress  7-  3. Pt to demonstrate improvement in gait speed to > / = m/s in order to demonstrate improved community ambulation Goal in progress 7-.  4. Pt to improve condition 4 of MSTCIB to > / = 20 seconds in order to improve safety. MET 8/25/2020  5. Pt to ambulate x 100 ft with LRAD* and with min A in order to demonstrate improved functional independence. MET 8/25/2020 - goal modified     Long Term Goals: 8 weeks   1. Pt to improve UE strength by > / = 1 MMT score in order to improve tolerance to daily activities.  2. Pt to improve B LE strength by > 1 MMT score in order to improve functional indepdence.   3. Pt to demonstrate improvement in self selected gait speed to > /  = 0.6 m/s with LRAD in order to demonstrate improved community ambulation. - updated 11/10/20  4. Pt to improve condition 4 of MSTCIB to 30 seconds in order to improve safety. MET 8/25/2020  5. Pt to ambulate x 100 ft with LRAD*  independently in order to demonstrate improved functional independence. MET 9/23/2020 - goal modified    Plan      Plan of care Certification: 6/9/2020 to 9/9/2020. EXTEND POC to 12/6/20     Outpatient Physical Therapy 2 times weekly for 8 weeks to include the following interventions: Gait Training, Manual Therapy, Moist Heat/ Ice, Neuromuscular Re-ed, Patient Education, Therapeutic Activites and Therapeutic Exercise.     Cont skilled PT session towards PT and patient's goals. Assess for plateau in 1 month.     Ligia Ibarra, PT      11/10/2020

## 2020-11-06 NOTE — TELEPHONE ENCOUNTER
----- Message from Clementina Vance sent at 11/5/2020  8:51 AM CST -----  Type: Patient Call Back       What is the request in detail:  pt calling requesting a new order for a new cpap.      Can the clinic reply by [facility]? No       Would the patient rather a call back or a response via [facility]? Call back       Best call back number: [phone]

## 2020-11-06 NOTE — TELEPHONE ENCOUNTER
Pt states he had his machine for many years and it isn't working anymore; he is requesting orders be sent to [facility]
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
## 2020-11-11 ENCOUNTER — TELEPHONE (OUTPATIENT)
Dept: FAMILY MEDICINE | Facility: CLINIC | Age: 70
End: 2020-11-11

## 2020-11-11 NOTE — TELEPHONE ENCOUNTER
----- Message from Maycol Hicks sent at 11/6/2020  4:24 PM CST -----  Regarding: Cpap  Contact: [phone]  I received an order for a Cpap machine replacement but will need current office notes to document the need for a replacement and that patient is benefiting from therapy. Thanks! Alex

## 2020-11-12 ENCOUNTER — TELEPHONE (OUTPATIENT)
Dept: FAMILY MEDICINE | Facility: CLINIC | Age: 70
End: 2020-11-12

## 2020-11-12 ENCOUNTER — EXTERNAL HOME HEALTH (OUTPATIENT)
Dept: HOME HEALTH SERVICES | Facility: HOSPITAL | Age: 70
End: 2020-11-12
Payer: MEDICARE

## 2020-11-12 NOTE — TELEPHONE ENCOUNTER
----- Message from Chari Plaza sent at 11/11/2020 11:56 AM CST -----  Contact: [facility] [phone]  Type:  Call Back    Who called: Dano Liu [facility]    What is the request in detail: Patient is stating that Dr Lombard ordered a CPap Machine. Calling to get orders for this. Please fax orders to [phone], or call in regards to this.     Would the patient rather a call back or a response via [facility]? Call back    Best call back number:[phone]

## 2020-11-13 DIAGNOSIS — R60.0 PERIPHERAL EDEMA: ICD-10-CM

## 2020-11-13 RX ORDER — FUROSEMIDE 40 MG/1
40 TABLET ORAL 2 TIMES DAILY
Qty: 180 TABLET | Refills: 0 | Status: SHIPPED | OUTPATIENT
Start: 2020-11-13 | End: 2021-08-25

## 2020-11-13 NOTE — TELEPHONE ENCOUNTER
Last Office Visit Info:   The patient's last visit with Azikiwe K Lombard, MD was on 7/30/2020.    The patient's last visit in current department was on 7/30/2020.        Last CBC Results:   Lab Results   Component Value Date    WBC 27.50 (H) 03/10/2020    HGB 11.9 (L) 03/10/2020    HCT 37.7 (L) 03/10/2020     (H) 03/10/2020       Last CMP Results  Lab Results   Component Value Date     (L) 03/10/2020    K 5.2 (H) 03/10/2020    CL 97 03/10/2020    CO2 24 03/10/2020    BUN 34 (H) 03/10/2020    CREATININE 1.3 03/10/2020    CALCIUM 9.2 03/10/2020    ALBUMIN 2.0 (L) 03/05/2020    AST 41 (H) 03/05/2020     (H) 03/05/2020       Last Lipids  Lab Results   Component Value Date    CHOL 124 08/19/2019    TRIG 134 08/19/2019    HDL 55 08/19/2019    LDLCALC 42.2 (L) 08/19/2019       Last A1C  Lab Results   Component Value Date    HGBA1C 6.2 (H) 03/07/2020       Last TSH  Lab Results   Component Value Date    TSH 0.445 02/27/2020         Current Med Refills  Medication List with Changes/Refills   Current Medications    ADVAIR DISKUS 250-50 MCG/DOSE DISKUS INHALER    Inhale 1 puff into the lungs 2 (two) times daily. Controller       Start Date: 7/30/2020 End Date: --    ALBUTEROL (PROAIR HFA) 90 MCG/ACTUATION INHALER    Inhale 2 puffs into the lungs every 6 (six) hours as needed for Wheezing. Rescue       Start Date: 4/21/2020 End Date: --    ASCORBIC ACID, VITAMIN C, (VITAMIN C) 500 MG TABLET    Take 500 mg by mouth once daily.       Start Date: --        End Date: --    ASPIRIN (ECOTRIN) 81 MG EC TABLET    Take 81 mg by mouth once daily.       Start Date: --        End Date: --    ATORVASTATIN (LIPITOR) 40 MG TABLET    Take 1 tablet (40 mg total) by mouth once daily.       Start Date: 7/30/2020 End Date: --    BLOOD PRESSURE TEST KIT-LARGE KIT    1 Device by Misc.(Non-Drug; Combo Route) route 2 (two) times daily.       Start Date: 8/28/2017 End Date: --    BLOOD SUGAR DIAGNOSTIC STRP    To check BG 2 times  daily, to use with insurance preferred meter       Start Date: 11/5/2020 End Date: --    BLOOD-GLUCOSE METER KIT    To check BG 2 times daily, to use with insurance preferred meter       Start Date: 2/5/2019  End Date: 7/30/2020    COLCHICINE (COLCRYS) 0.6 MG TABLET    Take 1 tablet (0.6 mg total) by mouth once daily.       Start Date: 4/17/2020 End Date: 4/17/2021    COLCRYS 0.6 MG TABLET    Take 1 tablet (0.6 mg total) by mouth once daily. Daily until you see rheumatology       Start Date: 3/2/2020  End Date: --    DICLOFENAC SODIUM (VOLTAREN) 1 % GEL    APPLY 2 GRAMS EXTERNALLY TO THE AFFECTED AREA EVERY DAY       Start Date: 6/10/2020 End Date: --    DILTIAZEM (CARTIA XT) 240 MG 24 HR CAPSULE    Take 1 capsule (240 mg total) by mouth once daily.       Start Date: 5/26/2020 End Date: --    FUROSEMIDE (LASIX) 40 MG TABLET    TAKE 1 TABLET(40 MG) BY MOUTH TWICE DAILY       Start Date: 8/4/2020  End Date: --    GABAPENTIN (NEURONTIN) 600 MG TABLET    TAKE 1 TABLET(600 MG) BY MOUTH THREE TIMES DAILY       Start Date: 10/26/2020End Date: --    HYDROCHLOROTHIAZIDE (HYDRODIURIL) 25 MG TABLET    Take 1 tablet (25 mg total) by mouth once daily.       Start Date: 5/26/2020 End Date: --    INSULIN DETEMIR U-100 (LEVEMIR FLEXTOUCH) 100 UNIT/ML (3 ML) SUBQ INPN PEN    Inject 10 Units into the skin 2 (two) times daily.       Start Date: 3/2/2020  End Date: 3/2/2021    LANCETS MISC    To check BG 2 times daily, to use with insurance preferred meter       Start Date: 10/2/2020 End Date: --    LISINOPRIL 10 MG TABLET    Take 4 tablets (40 mg total) by mouth once daily.       Start Date: 3/10/2020 End Date: 3/10/2021    TAMSULOSIN (FLOMAX) 0.4 MG CAP    Take 1 capsule (0.4 mg total) by mouth once daily.       Start Date: 4/21/2020 End Date: 4/21/2021    VITAMIN D (VITAMIN D3) 1000 UNITS TAB    Take 1,000 Units by mouth once daily.       Start Date: --        End Date: --       Order(s) placed per written order guidelines:      Please advise.

## 2020-11-18 ENCOUNTER — CLINICAL SUPPORT (OUTPATIENT)
Dept: REHABILITATION | Facility: HOSPITAL | Age: 70
End: 2020-11-18
Attending: FAMILY MEDICINE
Payer: MEDICARE

## 2020-11-18 DIAGNOSIS — M25.612 DECREASED ROM OF LEFT SHOULDER: Primary | ICD-10-CM

## 2020-11-18 DIAGNOSIS — R26.89 BALANCE PROBLEM: ICD-10-CM

## 2020-11-18 DIAGNOSIS — R68.89 DECREASED STRENGTH, ENDURANCE, AND MOBILITY: ICD-10-CM

## 2020-11-18 DIAGNOSIS — R29.898 DECREASED GRIP STRENGTH: ICD-10-CM

## 2020-11-18 DIAGNOSIS — M25.611 DECREASED ROM OF RIGHT SHOULDER: ICD-10-CM

## 2020-11-18 DIAGNOSIS — R26.9 GAIT ABNORMALITY: ICD-10-CM

## 2020-11-18 DIAGNOSIS — Z74.09 DECREASED STRENGTH, ENDURANCE, AND MOBILITY: ICD-10-CM

## 2020-11-18 DIAGNOSIS — R53.1 DECREASED STRENGTH, ENDURANCE, AND MOBILITY: ICD-10-CM

## 2020-11-18 DIAGNOSIS — Z78.9 ALTERATION IN INSTRUMENTAL ACTIVITIES OF DAILY LIVING (IADL): ICD-10-CM

## 2020-11-18 PROCEDURE — 97110 THERAPEUTIC EXERCISES: CPT | Mod: PN,CQ

## 2020-11-18 PROCEDURE — 97110 THERAPEUTIC EXERCISES: CPT | Mod: PN

## 2020-11-18 PROCEDURE — 97112 NEUROMUSCULAR REEDUCATION: CPT | Mod: PN,CQ

## 2020-11-18 NOTE — PROGRESS NOTES
"  Physical Therapy Daily Treatment Note     Name: Bj Pate Jr.  Clinic Number: [id number]    Therapy Diagnosis:   Encounter Diagnoses   Name Primary?    Decreased strength, endurance, and mobility     Gait abnormality     Balance problem      Physician: Lombard, Azikiwe K., MD    Visit Date: 11/18/2020    Physician Orders: PT Eval and Treat      Medical Diagnosis from Referral:   M1A.09X0 (ICD-10-CM) - Idiopathic chronic gout of multiple sites without tophus   R53.81 (ICD-10-CM) - Physical debility      Evaluation Date: 6/9/2020  Authorization Period Expiration: 6/10/2020  Plan of Care Expiration: 9/9/2020 (EXTEND TO 12/6/2020)  Visit # / Visits authorized: 11/20        PN due: 12/6/2020    Time In: 1300   Time Out: 1340  Total Time: 40 minutes  Total Billable Time: 40 minutes     Precautions: Standard and Diabetes    Subjective     Pt reports: overall doing well with no new concerns/complaints.     He was compliant with home exercise program.  Response to previous treatment: ongoing  Functional change: ongoing    Pain: 6/10  Location: Low back and shoulders     Objective             Bj received therapeutic exercises to develop strength for 32 minutes including:    NuStep Level 6 hills mode    7 min    Parallel Bars:  Standing Marches  2#   2x10  Standing hip abduction   2#   10x  Standing hip extension   2#   10x   Mini squat      2x10    Step ups 4 in step 0# on B LE   10x  Lateral step ups 6 in step 0# on B LE  2x10    Matrix Machines:  Matrix leg press 55#    2x10  Matrix hip abd +55#     2x10  Matrix hip add +50#     2x10   Matrix hamstring curls 55#                          2x10  Matrix leg extension 50#    2x10  Matrix Rows 55#    2x10    NP:  Seated PF against GTB 2x10  Toe taps to 8 in c/ one UE support 2x10  Sit<>Stand x 30 18" + Ball Lifts   side steps inside parallel bar YTB 3 laps    Standing heel raises 1x10     Bj participated in neuromuscular re-education activities to improve: Balance " "for 08 minutes. The following activities were included:    Static Standing on AirEx Feet Together EO  2x30"  Static Standing on AirEx Feet Apart EC    2x30"  Static standing on airex feet apart c/ head turns 2x30 secs each.  Step ups to foam  B LE    2x30"  Standing marching w/ bar in // bars  1 min    NBOS trunk rotation passing ball to PT x 10 (NP)    Static Standing-Feet Together 2x1' - NP  NP - Stepping over purple noodle x 10 ea LE  Static Standing on BOSU Feet Together x 3' NP  Static Standing on BOSU Feet Apart x 3' NP  Semi-Tandem Standing 2x1' NP  Semi-Tandem Weight Shifting 2 x 30 NP    Bj participated in gait training to improve functional mobility and safety for 0 minutes, including:    Lap around gym x 1 with SBQC    90 ft x2  with SPC, SBA (seated rest break in between)      NP (no time):   +Gait training in // bars with single point cane x 4 laps NP  +Gait training in // bars with small base quad cane x 4 lapsNP  +50 ft with SBQC and S- vc for sequencing and placement of cane NP    Ambulating outside on concrete x 100 feet c/ SBQC and S NP  Ascending/descending ramp with SBQC and S NP  Curb c/ SBQC and S  NP      Home Exercises Provided and Patient Education Provided     Education provided:   Cont to perform HEP as provided.     Written Home Exercises Provided: Patient instructed to cont prior HEP.  Exercises were reviewed and Bj was able to demonstrate them prior to the end of the session.  Bj demonstrated fair  understanding of the education provided.     See EMR under Patient Instructions for exercises provided 06/09/2020.    Assessment   Bj performed well in today's session. Reintroduced neuro re-ed activity to promote balance, stability and proprioception. He had difficulty with stabilization and foot clearance for step ups to airex and required one HHA for stability. He is progressing well with LE strengthening and was able to progress in weight using Matrix machine resistance " training. Brief rest breaks required throughout session 2' decreased endurance and for energy conservation.  He will continue to benefit from progressive LE strengthening, endurance training and balance/stabilization activity to improve his functional mobility and reduce fall risk.     Bj is progressing well towards his goals.   Pt prognosis is Good.     Pt will continue to benefit from skilled outpatient physical therapy to address the deficits listed in the problem list box on initial evaluation, provide pt/family education and to maximize pt's level of independence in the home and community environment.     Pt's spiritual, cultural and educational needs considered and pt agreeable to plan of care and goals.    Anticipated barriers to physical therapy: none    Goals:   Short Term Goals: 4 weeks   1. Pt to improve UE strength by > / = 1/2 MMT score in order to improve tolerance to daily activities. Goal In progress  7-  2. Pt to improve B LE strength by > 1/2 MMT score in order to improve functional indepdence. Goal in progress  7-  3. Pt to demonstrate improvement in gait speed to > / = m/s in order to demonstrate improved community ambulation Goal in progress 7-.  4. Pt to improve condition 4 of MSTCIB to > / = 20 seconds in order to improve safety. MET 8/25/2020  5. Pt to ambulate x 100 ft with LRAD* and with min A in order to demonstrate improved functional independence. MET 8/25/2020 - goal modified     Long Term Goals: 8 weeks   1. Pt to improve UE strength by > / = 1 MMT score in order to improve tolerance to daily activities.  2. Pt to improve B LE strength by > 1 MMT score in order to improve functional indepdence.   3. Pt to demonstrate improvement in self selected gait speed to > /  = 0.6 m/s with LRAD in order to demonstrate improved community ambulation. - updated 11/10/20  4. Pt to improve condition 4 of MSTCIB to 30 seconds in order to improve safety. MET 8/25/2020  5. Pt to  ambulate x 100 ft with LRAD* independently in order to demonstrate improved functional independence. MET 9/23/2020 - goal modified    Plan      Plan of care Certification: 6/9/2020 to 9/9/2020. EXTEND POC to 12/6/20     Outpatient Physical Therapy 2 times weekly for 8 weeks to include the following interventions: Gait Training, Manual Therapy, Moist Heat/ Ice, Neuromuscular Re-ed, Patient Education, Therapeutic Activites and Therapeutic Exercise.     Cont skilled PT session towards PT and patient's goals. Assess for plateau in 1 month.     Lexie Woods, PTA      11/18/2020

## 2020-11-18 NOTE — PROGRESS NOTES
"  Occupational Therapy Treatment Note      Date: 11/18/2020  Name: Bj Pate Jr.  Clinic Number: [id number]    Therapy Diagnosis:   Encounter Diagnoses   Name Primary?    Decreased ROM of left shoulder Yes    Decreased ROM of right shoulder     Alteration in instrumental activities of daily living (IADL)     Decreased  strength      Physician: Lombard, Azikiwe K., MD    Physician Orders: OT Eval and treat  Medical Diagnosis: CVA  Evaluation Date: 7/24/2020  Plan of Care Expiration Period: 10/2/20 to 12/22/20  Insurance Authorization period Expiration: 9/15/20  Date of Return to MD: TBD  Visit # / Visits Authorized: 19 /30  FOTO: 5th visit     Time In: 1:42 pm  Time Out: 2:30 pm  Total Billable (one on one) Time: 48 minutes     Precautions: Standard and Fall    Subjective     Pt reports: " Nothing has changed. Everyday of my life my arthritis is acting up" "I did the putty last night"  He was compliant with HEP  Response to previous treatment: positive   Functional change: Improved shoulder movement     Pain: 6/10   Location: generalized     Objective     Bj received therapeutic exercises for 48 minutes including: increased time needed due to fatigue    - Non resistive pulley stretch for scaption x 3 min and Flexion x 3 min  - seated chest press 30# 4x10  - seated rows 60# 4x10  - Standing long arm ext 10# free motion 3x10   - Bimanual Ball toss 4# weighted ball 4x10 reps  - Initiated b/l Balloon Volleyball with 1# cuff weights in scaption plane (ceased activity due to limited AROM)  - UBE AAROM Shoulder Abduction x 10 reps  - Ergo gripper 2nd notch 2x20 b hands  - Digits Extension red rubberband 2x20  - shoulders rolls x10 each direction  Home Exercises and Education Provided     Education provided:   - Shoulder HEP  - encouraged him to perform HEP  - Progress towards goals   -importance of scapula for shoulder motion    Written Home Exercises Provided: continue with previously instructed HEP  Exercises " were reviewed and Bj was able to demonstrate them prior to the end of the session.  Bj demonstrated good  understanding of the HEP provided.   .   See EMR under Patient Instructions for exercises provided 8/5/20.        Assessment     Upgraded UBE was tolerated. Pt c/o shoulder soreness during chest press exercise. Despite soreness exercise was completed with increased time. Good form and control with shoulder extension. Bimanual ball toss shoulder FF ~110 degrees. No significant improvements in shoulder abduction AROM but pt still shows potential to improve over time. Due to cold weather he feels recent arthritis flare up may be the reason of his limited improvement. Pt remains appropriate for skilled OT to address strengthening and shoulder AROM.    Bj is progressing slowly towards his goals and there are no updates to goals at this time. Pt prognosis is Good.     Pt will continue to benefit from skilled outpatient occupational therapy to address the deficits listed in the problem list on initial evaluation provide pt/family education and to maximize pt's level of independence in the home and community environment.     Anticipated barriers to occupational therapy: None    Pt's spiritual, cultural and educational needs considered and pt agreeable to plan of care and goals.    Goals:  Short Term Goals: 5 weeks   - Pt. Will be educated in HEP  MET  - Pt. Will have improved b/l shoulder AROM in FF, Abd, and ext if at least 10 degrees for easier dressing and bathing MET  - Pt. Will report no greater than 4/10 pain over 4 consecutive tx sessions  - Pt. Will have improved b/l  strength of at least 5# for increased functional hand use  - Pt. Will complete fine motor and pinch testing and goals will be established if appropriate MET     Long Term Goals: 10 weeks   - Pt. Will be independent with HEP MET  - Pt. Will have improve L AROM ER of at least 10 degrees MET  - Pt. Will have improved b/l shoulder AROM  in FF, Abd, and ext if at least 15 degrees for easier dressing and bathing  - Pt. Will report improved hand function for home care tasks  - Pt. Will have improved 9 HPT of no greater than 50 sec for improved ability to complete buttoning MET        Plan   Certification Period/Plan of care expiration: 7/24/2020 to 10/2/20 to 12/22/20     Outpatient Occupational Therapy 2 times weekly for 10 weeks to include the following interventions: Manual Therapy, Moist Heat/ Ice, Paraffin, Patient Education, Self Care, Therapeutic Activites and Therapeutic Exercise.       Updates/Grading for next session: as tolerated       TEO Talley     I certify that I was present in the room directing the student in service delivery and guiding them using my skilled judgment. As the co-signing therapist I have reviewed the students documentation and am responsible for the treatment, assessment, and plan.     Robert GALDAMEZ

## 2020-11-19 ENCOUNTER — OFFICE VISIT (OUTPATIENT)
Dept: FAMILY MEDICINE | Facility: CLINIC | Age: 70
End: 2020-11-19
Payer: MEDICARE

## 2020-11-19 VITALS
SYSTOLIC BLOOD PRESSURE: 116 MMHG | BODY MASS INDEX: 45.52 KG/M2 | HEIGHT: 69 IN | WEIGHT: 307.31 LBS | RESPIRATION RATE: 20 BRPM | DIASTOLIC BLOOD PRESSURE: 80 MMHG | TEMPERATURE: 98 F | HEART RATE: 75 BPM | OXYGEN SATURATION: 98 %

## 2020-11-19 DIAGNOSIS — M47.22 OSTEOARTHRITIS OF SPINE WITH RADICULOPATHY, CERVICAL REGION: ICD-10-CM

## 2020-11-19 DIAGNOSIS — E78.5 DYSLIPIDEMIA: ICD-10-CM

## 2020-11-19 DIAGNOSIS — G56.23 ULNAR NEUROPATHY OF BOTH UPPER EXTREMITIES: ICD-10-CM

## 2020-11-19 DIAGNOSIS — I10 ESSENTIAL HYPERTENSION: ICD-10-CM

## 2020-11-19 DIAGNOSIS — E11.22 TYPE 2 DIABETES MELLITUS WITH STAGE 3A CHRONIC KIDNEY DISEASE, WITHOUT LONG-TERM CURRENT USE OF INSULIN: ICD-10-CM

## 2020-11-19 DIAGNOSIS — G47.33 OBSTRUCTIVE SLEEP APNEA ON CPAP: Primary | ICD-10-CM

## 2020-11-19 DIAGNOSIS — N18.31 TYPE 2 DIABETES MELLITUS WITH STAGE 3A CHRONIC KIDNEY DISEASE, WITHOUT LONG-TERM CURRENT USE OF INSULIN: ICD-10-CM

## 2020-11-19 PROCEDURE — 1125F PR PAIN SEVERITY QUANTIFIED, PAIN PRESENT: ICD-10-PCS | Mod: S$GLB,,, | Performed by: FAMILY MEDICINE

## 2020-11-19 PROCEDURE — 99214 OFFICE O/P EST MOD 30 MIN: CPT | Mod: S$GLB,,, | Performed by: FAMILY MEDICINE

## 2020-11-19 PROCEDURE — 3008F PR BODY MASS INDEX (BMI) DOCUMENTED: ICD-10-PCS | Mod: CPTII,S$GLB,, | Performed by: FAMILY MEDICINE

## 2020-11-19 PROCEDURE — 99499 RISK ADDL DX/OHS AUDIT: ICD-10-PCS | Mod: S$GLB,,, | Performed by: FAMILY MEDICINE

## 2020-11-19 PROCEDURE — 3288F PR FALLS RISK ASSESSMENT DOCUMENTED: ICD-10-PCS | Mod: CPTII,S$GLB,, | Performed by: FAMILY MEDICINE

## 2020-11-19 PROCEDURE — 3008F BODY MASS INDEX DOCD: CPT | Mod: CPTII,S$GLB,, | Performed by: FAMILY MEDICINE

## 2020-11-19 PROCEDURE — 99499 UNLISTED E&M SERVICE: CPT | Mod: S$GLB,,, | Performed by: FAMILY MEDICINE

## 2020-11-19 PROCEDURE — 1125F AMNT PAIN NOTED PAIN PRSNT: CPT | Mod: S$GLB,,, | Performed by: FAMILY MEDICINE

## 2020-11-19 PROCEDURE — 3044F PR MOST RECENT HEMOGLOBIN A1C LEVEL <7.0%: ICD-10-PCS | Mod: CPTII,S$GLB,, | Performed by: FAMILY MEDICINE

## 2020-11-19 PROCEDURE — 3074F SYST BP LT 130 MM HG: CPT | Mod: CPTII,S$GLB,, | Performed by: FAMILY MEDICINE

## 2020-11-19 PROCEDURE — 3079F PR MOST RECENT DIASTOLIC BLOOD PRESSURE 80-89 MM HG: ICD-10-PCS | Mod: CPTII,S$GLB,, | Performed by: FAMILY MEDICINE

## 2020-11-19 PROCEDURE — 99999 PR PBB SHADOW E&M-EST. PATIENT-LVL IV: CPT | Mod: PBBFAC,,, | Performed by: FAMILY MEDICINE

## 2020-11-19 PROCEDURE — 99214 PR OFFICE/OUTPT VISIT, EST, LEVL IV, 30-39 MIN: ICD-10-PCS | Mod: S$GLB,,, | Performed by: FAMILY MEDICINE

## 2020-11-19 PROCEDURE — 99999 PR PBB SHADOW E&M-EST. PATIENT-LVL IV: ICD-10-PCS | Mod: PBBFAC,,, | Performed by: FAMILY MEDICINE

## 2020-11-19 PROCEDURE — 3074F PR MOST RECENT SYSTOLIC BLOOD PRESSURE < 130 MM HG: ICD-10-PCS | Mod: CPTII,S$GLB,, | Performed by: FAMILY MEDICINE

## 2020-11-19 PROCEDURE — 1101F PT FALLS ASSESS-DOCD LE1/YR: CPT | Mod: CPTII,S$GLB,, | Performed by: FAMILY MEDICINE

## 2020-11-19 PROCEDURE — 3044F HG A1C LEVEL LT 7.0%: CPT | Mod: CPTII,S$GLB,, | Performed by: FAMILY MEDICINE

## 2020-11-19 PROCEDURE — 1159F MED LIST DOCD IN RCRD: CPT | Mod: S$GLB,,, | Performed by: FAMILY MEDICINE

## 2020-11-19 PROCEDURE — 1159F PR MEDICATION LIST DOCUMENTED IN MEDICAL RECORD: ICD-10-PCS | Mod: S$GLB,,, | Performed by: FAMILY MEDICINE

## 2020-11-19 PROCEDURE — 1101F PR PT FALLS ASSESS DOC 0-1 FALLS W/OUT INJ PAST YR: ICD-10-PCS | Mod: CPTII,S$GLB,, | Performed by: FAMILY MEDICINE

## 2020-11-19 PROCEDURE — 3288F FALL RISK ASSESSMENT DOCD: CPT | Mod: CPTII,S$GLB,, | Performed by: FAMILY MEDICINE

## 2020-11-19 PROCEDURE — 3079F DIAST BP 80-89 MM HG: CPT | Mod: CPTII,S$GLB,, | Performed by: FAMILY MEDICINE

## 2020-11-19 RX ORDER — DILTIAZEM HYDROCHLORIDE 240 MG/1
240 CAPSULE, COATED, EXTENDED RELEASE ORAL DAILY
Qty: 90 CAPSULE | Refills: 1 | Status: SHIPPED | OUTPATIENT
Start: 2020-11-19 | End: 2021-04-28 | Stop reason: SDUPTHER

## 2020-11-19 RX ORDER — HYDROCHLOROTHIAZIDE 25 MG/1
25 TABLET ORAL DAILY
Qty: 90 TABLET | Refills: 1 | Status: SHIPPED | OUTPATIENT
Start: 2020-11-19 | End: 2021-05-28 | Stop reason: SDUPTHER

## 2020-11-19 RX ORDER — GABAPENTIN 600 MG/1
TABLET ORAL
Qty: 90 TABLET | Refills: 5 | Status: SHIPPED | OUTPATIENT
Start: 2020-11-19 | End: 2021-06-14

## 2020-11-19 RX ORDER — ATORVASTATIN CALCIUM 40 MG/1
40 TABLET, FILM COATED ORAL DAILY
Qty: 90 TABLET | Refills: 1 | Status: SHIPPED | OUTPATIENT
Start: 2020-11-19 | End: 2021-07-06

## 2020-11-19 NOTE — PROGRESS NOTES
Chief Complaint   Patient presents with    CPAP       Bj Pate Jr. is a 70 y.o. male who presents per the Chief Complaint.  Pt is known to me and was last seen by me on 7/30/2020.  All known chronic medical issues have been documented.    Hypertension  This is a chronic problem. The current episode started more than 1 year ago. The problem has been gradually worsening since onset. The problem is controlled. Pertinent negatives include no anxiety, blurred vision, chest pain, headaches, malaise/fatigue, neck pain, orthopnea, palpitations, peripheral edema, PND, shortness of breath or sweats. Agents associated with hypertension include amphetamines. Risk factors for coronary artery disease include obesity, male gender, dyslipidemia and sedentary lifestyle. Past treatments include ACE inhibitors, calcium channel blockers and diuretics. The current treatment provides moderate improvement. Compliance problems include exercise.  There is no history of angina, kidney disease (improved), CAD/MI, CVA, heart failure, left ventricular hypertrophy, PVD or retinopathy. Identifiable causes of hypertension include sleep apnea. There is no history of chronic renal disease (improved), coarctation of the aorta, hyperaldosteronism, hypercortisolism, hyperparathyroidism, a hypertension causing med, pheochromocytoma, renovascular disease or a thyroid problem.       ROS  Review of Systems   Constitutional: Positive for activity change and unexpected weight change. Negative for appetite change, chills, diaphoresis, fatigue, fever and malaise/fatigue.   HENT: Negative.  Negative for congestion, ear pain, hearing loss, nosebleeds, postnasal drip, rhinorrhea, sinus pressure, sneezing, sore throat and trouble swallowing.    Eyes: Negative for blurred vision, pain, discharge and visual disturbance.   Respiratory: Positive for apnea and wheezing. Negative for cough, choking, chest tightness and shortness of breath.    Cardiovascular:  "Positive for leg swelling. Negative for chest pain, palpitations, orthopnea and PND.   Gastrointestinal: Negative for abdominal pain, blood in stool, constipation, diarrhea, nausea and vomiting.   Endocrine: Negative for polydipsia and polyuria.   Genitourinary: Negative for difficulty urinating, dysuria, frequency, hematuria and urgency.   Musculoskeletal: Positive for arthralgias (multiple joint swelling and pain; improved), gait problem and joint swelling (resolved). Negative for back pain, myalgias and neck pain.   Skin: Negative.    Allergic/Immunologic: Negative for environmental allergies and food allergies.   Neurological: Positive for weakness. Negative for dizziness, seizures, syncope, light-headedness and headaches.   Psychiatric/Behavioral: Positive for sleep disturbance (interupted sleep since machine stopped working). Negative for confusion, decreased concentration and dysphoric mood. The patient is not nervous/anxious.        Physical Exam  Vitals:    11/19/20 1524   BP: 116/80   Pulse: 75   Resp: 20   Temp: 98 °F (36.7 °C)    Body mass index is 45.38 kg/m².  Weight: (!) 139.4 kg (307 lb 5.1 oz)   Height: 5' 9" (175.3 cm)     Physical Exam  Constitutional:       General: He is not in acute distress.     Appearance: Normal appearance. He is well-developed. He is not ill-appearing, toxic-appearing or diaphoretic.   HENT:      Head: Normocephalic and atraumatic.      Right Ear: Hearing and external ear normal. No decreased hearing noted.      Left Ear: Hearing and external ear normal. No decreased hearing noted.      Nose: Nose normal. No nasal deformity or rhinorrhea.      Mouth/Throat:      Dentition: Normal dentition. Does not have dentures.      Pharynx: Uvula midline.   Eyes:      General: Lids are normal. No scleral icterus.        Right eye: No foreign body or discharge.         Left eye: No foreign body or discharge.      Conjunctiva/sclera: Conjunctivae normal.      Right eye: No chemosis or " exudate.     Left eye: No chemosis or exudate.     Pupils: Pupils are equal, round, and reactive to light.   Neck:      Musculoskeletal: Full passive range of motion without pain, normal range of motion and neck supple.   Cardiovascular:      Rate and Rhythm: Normal rate and regular rhythm.      Heart sounds: Normal heart sounds, S1 normal and S2 normal. No murmur. No friction rub. No gallop.    Pulmonary:      Effort: Pulmonary effort is normal. No accessory muscle usage or respiratory distress.      Breath sounds: Normal breath sounds. No decreased breath sounds, wheezing, rhonchi or rales.   Abdominal:      General: There is no distension.      Palpations: Abdomen is soft. Abdomen is not rigid.      Tenderness: There is no abdominal tenderness. There is no guarding or rebound.   Musculoskeletal: Normal range of motion.      Right ankle: He exhibits swelling. He exhibits normal range of motion, no ecchymosis, no deformity, no laceration and normal pulse. No tenderness. No lateral malleolus, no medial malleolus, no AITFL, no CF ligament, no posterior TFL, no head of 5th metatarsal and no proximal fibula tenderness found.      Left ankle: He exhibits swelling. He exhibits normal range of motion, no ecchymosis, no deformity, no laceration and normal pulse. No tenderness. No lateral malleolus, no medial malleolus, no AITFL, no CF ligament, no posterior TFL, no head of 5th metatarsal and no proximal fibula tenderness found.      Cervical back: He exhibits tenderness and pain. He exhibits normal range of motion, no bony tenderness, no swelling, no edema, no deformity, no laceration, no spasm and normal pulse.      Right hand: He exhibits normal range of motion, no tenderness, no bony tenderness, normal two-point discrimination, normal capillary refill, no deformity, no laceration and no swelling. Decreased sensation noted. Normal strength noted.      Left hand: He exhibits normal range of motion, no tenderness, no bony  tenderness, normal two-point discrimination, normal capillary refill, no deformity, no laceration and no swelling. Decreased sensation noted. Normal strength noted.        Hands:       Right foot: Normal range of motion. Bunion present. No deformity, Charcot foot, foot drop or prominent metatarsal heads.      Left foot: Normal range of motion. No deformity, bunion, Charcot foot, foot drop or prominent metatarsal heads.        Feet:       Comments: Fingertips with decreased sensation; capillary refill appears normal.   Feet:      Right foot:      Protective Sensation: 7 sites tested. 6 sites sensed.      Skin integrity: Skin integrity normal.      Toenail Condition: Right toenails are abnormally thick. Fungal disease present.     Left foot:      Protective Sensation: 7 sites tested. 6 sites sensed.      Skin integrity: Skin integrity normal.      Toenail Condition: Left toenails are abnormally thick. Fungal disease present.  Skin:     General: Skin is warm and dry.      Findings: No rash.   Neurological:      Mental Status: He is alert and oriented to person, place, and time.      Cranial Nerves: No cranial nerve deficit.      Sensory: No sensory deficit.      Motor: No abnormal muscle tone or seizure activity.      Coordination: Coordination normal.      Gait: Gait normal.   Psychiatric:         Attention and Perception: He is attentive.         Speech: Speech normal.         Behavior: Behavior normal. Behavior is cooperative.         Thought Content: Thought content normal.         Judgment: Judgment normal.         Assessment & Plan    Discussion of plan of care including treatment options regarding health and wellness were reviewed and discussed with patient.  Any changes to medication or treatment plan, as well as any screening blood test, imaging, or referrals to specialist, are documented.  Follow up as indicated.     1. Obstructive sleep apnea on CPAP: setting = 8  Current machine stopped working 11/4 during  middle of night; patient reported waking at that moment.  Machine has not started working since.  He has not been able to sleep well through the night due to his sleep apnea.  He has been using a CPAP machine for over 12 years and has benefited significantly from it.  His sleep and breathing have been affected since machine malfunction and is great need of a replacement to prevent significant complication or possible death.     2. Essential hypertension  Patient was counseled and encouraged to maintain a low sodium diet, as well as increasing physical activity.  Recommend random BP checks at home on a regular basis.  Repeat BP at end of visit was not necessary. Will continue medication at this time, and follow up in 3-6 months, or sooner if blood pressure begins to increase.     - hydroCHLOROthiazide (HYDRODIURIL) 25 MG tablet; Take 1 tablet (25 mg total) by mouth once daily.  Dispense: 90 tablet; Refill: 1  - diltiaZEM (CARTIA XT) 240 MG 24 hr capsule; Take 1 capsule (240 mg total) by mouth once daily.  Dispense: 90 capsule; Refill: 1    3. Type 2 diabetes mellitus with stage 3a chronic kidney disease, without long-term current use of insulin  Patient is encouraged to follow a diet low in carbohydrates and simple sugars.  Discussed simple vs. complex carbohydrates as well as eating times of certain meals. Advised to focus on good food choices and increased physical activity and encouraged to adhere to medication regimen and/or lifestyle adjustments, and to check glucose level as recommended.  Contact office if glucose levels are not improving over time.  Will monitor HbA1c appropriately.  Sensory exam of the foot is normal, tested with the monofilament. Good pulses, no lesions or ulcers, good peripheral pulses.     4. Dyslipidemia  We discussed ways to manage cholesterol levels, including increasing whole grain foods and decreasing fried and fatty foods.  I also recommended OTC Omega 3 and Omega 6 supplements to  improve overall cholesterol levels.  Will continue current therapy at this visit and will monitor lipids appropriately.   - atorvastatin (LIPITOR) 40 MG tablet; Take 1 tablet (40 mg total) by mouth once daily.  Dispense: 90 tablet; Refill: 1    5. Osteoarthritis of spine with radiculopathy, cervical region  Medication prescribed for use only as symptoms require.   - gabapentin (NEURONTIN) 600 MG tablet; TAKE 1 TABLET(600 MG) BY MOUTH THREE TIMES DAILY  Dispense: 90 tablet; Refill: 5    6. Ulnar neuropathy of both upper extremities  Stable; no therapeutic changes at this time.  Medication prescribed for use only as symptoms require.   - gabapentin (NEURONTIN) 600 MG tablet; TAKE 1 TABLET(600 MG) BY MOUTH THREE TIMES DAILY  Dispense: 90 tablet; Refill: 5      Follow up in about 3 months (around 2/19/2021) for Chronic Disease Management.      ACTIVE MEDICAL ISSUES:  Documented in Problem List    PAST MEDICAL HISTORY  Documented  .  PAST SURGICAL HISTORY:  Documented    SOCIAL HISTORY:  Documented    FAMILY HISTORY:  Documented    ALLERGIES AND MEDICATIONS: updated and reviewed.  Documented    Health Maintenance       Date Due Completion Date    Shingles Vaccine (1 of 2) 01/21/2002 ---    Foot Exam 01/30/2019 1/30/2018    Hemoglobin A1c 06/07/2020 3/7/2020    Override on 4/8/2016: Done (future)    Override on 1/4/2016: Done (future)    Override on 8/22/2014: Done    Lipid Panel 08/19/2020 8/19/2019    Override on 8/22/2014: Done    Colorectal Cancer Screening 08/20/2020 8/20/2019    Override on 2/21/2005: Done    Eye Exam 11/27/2020 11/27/2019    Override on 8/22/2014: Done    TETANUS VACCINE 02/19/2026 2/19/2016

## 2020-11-19 NOTE — PROGRESS NOTES
Health Maintenance Due   Topic     Shingles Vaccine (1 of 2) hx chickenpox ; inform pt can get vaccine at pharmacy.    Foot Exam  Foot prep to be examine today if PCP have time       Hemoglobin A1c  Consult with pcp     Lipid Panel  Consult with PCP    Colorectal Cancer Screening  Pt have fitkit at home when mail out when ready     Eye Exam  Consult with PCP

## 2020-11-20 ENCOUNTER — CLINICAL SUPPORT (OUTPATIENT)
Dept: REHABILITATION | Facility: HOSPITAL | Age: 70
End: 2020-11-20
Attending: FAMILY MEDICINE
Payer: MEDICARE

## 2020-11-20 DIAGNOSIS — Z78.9 ALTERATION IN INSTRUMENTAL ACTIVITIES OF DAILY LIVING (IADL): ICD-10-CM

## 2020-11-20 DIAGNOSIS — R26.9 GAIT ABNORMALITY: ICD-10-CM

## 2020-11-20 DIAGNOSIS — Z74.09 DECREASED STRENGTH, ENDURANCE, AND MOBILITY: Primary | ICD-10-CM

## 2020-11-20 DIAGNOSIS — M25.611 DECREASED ROM OF RIGHT SHOULDER: ICD-10-CM

## 2020-11-20 DIAGNOSIS — R53.1 DECREASED STRENGTH, ENDURANCE, AND MOBILITY: Primary | ICD-10-CM

## 2020-11-20 DIAGNOSIS — R26.89 BALANCE PROBLEM: ICD-10-CM

## 2020-11-20 DIAGNOSIS — M25.612 DECREASED ROM OF LEFT SHOULDER: Primary | ICD-10-CM

## 2020-11-20 DIAGNOSIS — R68.89 DECREASED STRENGTH, ENDURANCE, AND MOBILITY: Primary | ICD-10-CM

## 2020-11-20 DIAGNOSIS — R29.898 DECREASED GRIP STRENGTH: ICD-10-CM

## 2020-11-20 PROCEDURE — 97110 THERAPEUTIC EXERCISES: CPT | Mod: PN

## 2020-11-20 NOTE — PROGRESS NOTES
"  Occupational Therapy Treatment Note      Date: 11/20/2020  Name: Bj Pate Jr.  Clinic Number: [id number]    Therapy Diagnosis:   Encounter Diagnoses   Name Primary?    Decreased ROM of left shoulder Yes    Decreased ROM of right shoulder     Alteration in instrumental activities of daily living (IADL)     Decreased  strength      Physician: Lombard, Azikiwe K., MD    Physician Orders: OT Eval and treat  Medical Diagnosis: CVA  Evaluation Date: 7/24/2020  Plan of Care Expiration Period: 10/2/20 to 12/22/20  Insurance Authorization period Expiration: 9/15/20  Date of Return to MD: TBD  Visit # / Visits Authorized: 20 /30  FOTO: 5th visit     Time In: 12:55 pm  Time Out: 1:40 pm  Total Billable (one on one) Time: 45 minutes     Precautions: Standard and Fall    Subjective     Pt reports: " I've been so busy. Something always come up. Either I'm at my doctor's appointment or run errands for someone else"  He was compliant with HEP  Response to previous treatment: positive   Functional change: Improved shoulder movement     Pain: 5/10   Location: generalized     Objective     Bj received therapeutic exercises for 45 minutes including: increased time needed due to fatigue  - Seated UBE Scit[employer] Pro II Res 5.0 x8 min with verbal cues for directional changes every min  - Non resistive pulley stretch for scaption x 3 min and Flexion x 3 min  - Initiated Wall Towel Slides.  - seated chest press 30# 4x10  - seated rows 60# 4x10  - Supine b/l shoulder ER stretch with 5# DB x 2 mins each side  - sidelying ER  3x10 2# DB B UEs    Home Exercises and Education Provided     Education provided:   - Shoulder HEP  - encouraged him to perform HEP  - Progress towards goals   -importance of scapula for shoulder motion    Written Home Exercises Provided: continue with previously instructed HEP  Exercises were reviewed and Bj was able to demonstrate them prior to the end of the session.  Bj demonstrated good  " understanding of the HEP provided.   .   See EMR under Patient Instructions for exercises provided 8/5/20.        Assessment     Pt had good tolerance to tx this date. Pain decreased since previous visit. Upgraded UBE was tolerated with increased rest breaks. Cessation of wall slides due to limited ER. [place] assist needed to prevent elbow flexion during shoulder ER stretch. No significant improvements in shoulder abduction AROM but pt report doing well functionally at home. Pt's motivation continues and potential to improve remains. Pt remains appropriate for skilled OT to address strengthening and shoulder AROM limitations.    Bj is progressing slowly towards his goals and there are no updates to goals at this time. Pt prognosis is Good.     Pt will continue to benefit from skilled outpatient occupational therapy to address the deficits listed in the problem list on initial evaluation provide pt/family education and to maximize pt's level of independence in the home and community environment.     Anticipated barriers to occupational therapy: None    Pt's spiritual, cultural and educational needs considered and pt agreeable to plan of care and goals.    Goals:  Short Term Goals: 5 weeks   - Pt. Will be educated in HEP  MET  - Pt. Will have improved b/l shoulder AROM in FF, Abd, and ext if at least 10 degrees for easier dressing and bathing MET  - Pt. Will report no greater than 4/10 pain over 4 consecutive tx sessions  - Pt. Will have improved b/l  strength of at least 5# for increased functional hand use  - Pt. Will complete fine motor and pinch testing and goals will be established if appropriate MET     Long Term Goals: 10 weeks   - Pt. Will be independent with HEP MET  - Pt. Will have improve L AROM ER of at least 10 degrees MET  - Pt. Will have improved b/l shoulder AROM in FF, Abd, and ext if at least 15 degrees for easier dressing and bathing  - Pt. Will report improved hand function for home care  tasks  - Pt. Will have improved 9 HPT of no greater than 50 sec for improved ability to complete buttoning MET        Plan   Certification Period/Plan of care expiration: 7/24/2020 to 10/2/20 to 12/22/20     Outpatient Occupational Therapy 2 times weekly for 10 weeks to include the following interventions: Manual Therapy, Moist Heat/ Ice, Paraffin, Patient Education, Self Care, Therapeutic Activites and Therapeutic Exercise.       Updates/Grading for next session: as tolerated       TEO Talley     I certify that I was present in the room directing the student in service delivery and guiding them using my skilled judgment. As the co-signing therapist I have reviewed the students documentation and am responsible for the treatment, assessment, and plan.     Robert GALDAMEZ

## 2020-11-20 NOTE — PROGRESS NOTES
"  Physical Therapy Daily Treatment Note     Name: Bj Pate Jr.  Clinic Number: [id number]    Therapy Diagnosis:   Encounter Diagnoses   Name Primary?    Decreased strength, endurance, and mobility Yes    Gait abnormality     Balance problem      Physician: Lombard, Azikiwe K., MD    Visit Date: 11/20/2020    Physician Orders: PT Eval and Treat      Medical Diagnosis from Referral:   M1A.09X0 (ICD-10-CM) - Idiopathic chronic gout of multiple sites without tophus   R53.81 (ICD-10-CM) - Physical debility      Evaluation Date: 6/9/2020  Authorization Period Expiration: 6/10/2020  Plan of Care Expiration: 9/9/2020 (EXTEND TO 12/6/2020)  Visit # / Visits authorized: 12/20        PN due: 12/6/2020    Time In: 1350  Time Out: 1430  Total Time: 40 minutes  Total Billable Time: 40 minutes     Precautions: Standard and Diabetes    Subjective     Pt reports: overall doing well with no new concerns/complaints.     He was compliant with home exercise program.  Response to previous treatment: ongoing  Functional change: ongoing    Pain: 6/10  Location: Low back and shoulders     Objective      Bj received therapeutic exercises to develop strength for 35 minutes including:    NuStep Level 6 hills mode    7 min    Parallel Bars:  Standing Marches  2#   2x10  Standing hip abduction   2#   10x  Standing hip extension   2#   10x   Mini squat      2x10    Step ups 4 in step 0# on B LE   10x  Lateral step ups 6 in step 0# on B LE  2x10    Matrix Machines:  Matrix leg press 55#    2x10  Matrix hip abd +55#     2x10  Matrix hip add +50#     2x10   Matrix hamstring curls 55#                          2x10  Matrix leg extension 50#    2x10  Matrix Rows 55#    2x10    NP:  Seated PF against GTB 2x10  Toe taps to 8 in c/ one UE support 2x10  Sit<>Stand x 30 18" + Ball Lifts   side steps inside parallel bar YTB 3 laps    Standing heel raises 1x10     Bj participated in neuromuscular re-education activities to improve: Balance for 00 " "minutes. The following activities were included:    Static Standing on AirEx Feet Together EO  2x30"  Static Standing on AirEx Feet Apart EC    2x30"  Static standing on airex feet apart c/ head turns 2x30 secs each.  Step ups to foam  B LE    2x30"  Standing marching w/ bar in // bars  1 min    NBOS trunk rotation passing ball to PT x 10 (NP)    Static Standing-Feet Together 2x1' - NP  NP - Stepping over purple noodle x 10 ea LE  Static Standing on BOSU Feet Together x 3' NP  Static Standing on BOSU Feet Apart x 3' NP  Semi-Tandem Standing 2x1' NP  Semi-Tandem Weight Shifting 2 x 30 NP    Bj participated in gait training to improve functional mobility and safety for 5 minutes, including:    Lap around gym x 1 with SBQC    90 ft x2  with SPC, SBA (seated rest break in between)      NP (no time):   +Gait training in // bars with single point cane x 4 laps NP  +Gait training in // bars with small base quad cane x 4 lapsNP  +50 ft with SBQC and S- vc for sequencing and placement of cane NP    Ambulating outside on concrete x 100 feet c/ SBQC and S NP  Ascending/descending ramp with SBQC and S NP  Curb c/ SBQC and S  NP      Home Exercises Provided and Patient Education Provided     Education provided:   Cont to perform HEP as provided.     Written Home Exercises Provided: Patient instructed to cont prior HEP.  Exercises were reviewed and Bj was able to demonstrate them prior to the end of the session.  Bj demonstrated fair  understanding of the education provided.     See EMR under Patient Instructions for exercises provided 06/09/2020.    Assessment   Bj performed well in today's session.Pt with appropriate challenge throughout LE there-ex this session. Pt continues with gait abnormalities including decreased step length bilaterally, forward trunk lean, and L hip drop during stance. He will continue to benefit from progressive LE strengthening, endurance training and balance/stabilization activities to " improve his functional mobility and reduce fall risk.     Bj is progressing well towards his goals.   Pt prognosis is Good.     Pt will continue to benefit from skilled outpatient physical therapy to address the deficits listed in the problem list box on initial evaluation, provide pt/family education and to maximize pt's level of independence in the home and community environment.     Pt's spiritual, cultural and educational needs considered and pt agreeable to plan of care and goals.    Anticipated barriers to physical therapy: none    Goals:   Short Term Goals: 4 weeks   1. Pt to improve UE strength by > / = 1/2 MMT score in order to improve tolerance to daily activities. Goal In progress  7-  2. Pt to improve B LE strength by > 1/2 MMT score in order to improve functional indepdence. Goal in progress  7-  3. Pt to demonstrate improvement in gait speed to > / = m/s in order to demonstrate improved community ambulation Goal in progress 7-.  4. Pt to improve condition 4 of MSTCIB to > / = 20 seconds in order to improve safety. MET 8/25/2020  5. Pt to ambulate x 100 ft with LRAD* and with min A in order to demonstrate improved functional independence. MET 8/25/2020 - goal modified     Long Term Goals: 8 weeks   1. Pt to improve UE strength by > / = 1 MMT score in order to improve tolerance to daily activities.  2. Pt to improve B LE strength by > 1 MMT score in order to improve functional indepdence.   3. Pt to demonstrate improvement in self selected gait speed to > /  = 0.6 m/s with LRAD in order to demonstrate improved community ambulation. - updated 11/10/20  4. Pt to improve condition 4 of MSTCIB to 30 seconds in order to improve safety. MET 8/25/2020  5. Pt to ambulate x 100 ft with LRAD* independently in order to demonstrate improved functional independence. MET 9/23/2020 - goal modified    Plan      Plan of care Certification: 6/9/2020 to 9/9/2020. EXTEND POC to  12/6/20     Outpatient Physical Therapy 2 times weekly for 8 weeks to include the following interventions: Gait Training, Manual Therapy, Moist Heat/ Ice, Neuromuscular Re-ed, Patient Education, Therapeutic Activites and Therapeutic Exercise.     Cont skilled PT session towards PT and patient's goals. Assess for plateau in 1 month.     Ligia Ibarra, PT      11/23/2020

## 2020-12-07 ENCOUNTER — CLINICAL SUPPORT (OUTPATIENT)
Dept: REHABILITATION | Facility: HOSPITAL | Age: 70
End: 2020-12-07
Attending: FAMILY MEDICINE
Payer: MEDICARE

## 2020-12-07 DIAGNOSIS — R26.89 BALANCE PROBLEM: ICD-10-CM

## 2020-12-07 DIAGNOSIS — Z74.09 DECREASED STRENGTH, ENDURANCE, AND MOBILITY: Primary | ICD-10-CM

## 2020-12-07 DIAGNOSIS — R68.89 DECREASED STRENGTH, ENDURANCE, AND MOBILITY: Primary | ICD-10-CM

## 2020-12-07 DIAGNOSIS — R26.9 GAIT ABNORMALITY: ICD-10-CM

## 2020-12-07 DIAGNOSIS — R53.1 DECREASED STRENGTH, ENDURANCE, AND MOBILITY: Primary | ICD-10-CM

## 2020-12-07 PROCEDURE — 97112 NEUROMUSCULAR REEDUCATION: CPT | Mod: PN

## 2020-12-07 PROCEDURE — 97110 THERAPEUTIC EXERCISES: CPT | Mod: PN

## 2020-12-07 NOTE — PROGRESS NOTES
"  Physical Therapy Daily Treatment Note     Name: Bj Pate Jr.  Clinic Number: [id number]    Therapy Diagnosis:   Encounter Diagnoses   Name Primary?    Decreased strength, endurance, and mobility Yes    Gait abnormality     Balance problem      Physician: Lombard, Azikiwe K., MD    Visit Date: 12/7/2020    Physician Orders: PT Eval and Treat      Medical Diagnosis from Referral:   M1A.09X0 (ICD-10-CM) - Idiopathic chronic gout of multiple sites without tophus   R53.81 (ICD-10-CM) - Physical debility      Evaluation Date: 6/9/2020  Authorization Period Expiration: 6/10/2020  Plan of Care Expiration: 9/9/2020 (EXTEND TO 12/6/2020)  Visit # / Visits authorized: 13/20        PN due: 12/6/2020    Time In: 1420 pm   Time Out: 1500 pm  Total Time: 40 minutes  Total Billable Time: 40 minutes     Precautions: Standard and Diabetes    Subjective     Pt reports: overall doing well with no new concerns/complaints.     He was compliant with home exercise program.  Response to previous treatment: ongoing  Functional change: ongoing    Pain: 6/10  Location: Low back and shoulders     Objective      Bj received therapeutic exercises to develop strength for 25 minutes including:    NuStep Level 6 hills mode    7 min    Parallel Bars:  Standing Marches  2#   2x10  Standing hip abduction   2#   10x  Standing hip extension   2#   10x   Mini squat      2x10    Step ups 4 in step 0# on B LE   10x  Lateral step ups 6 in step 0# on B LE  2x10    Matrix Machines:  Matrix leg press 55#                2x10  Matrix hip abd +55#     2x10  Matrix hip add +50#     2x10   Matrix hamstring curls 55#                          2x10  Matrix leg extension 50#    2x10  Matrix Rows 55#    2x10    NP:  Seated PF against GTB 2x10  Toe taps to 8 in c/ one UE support 2x10  Sit<>Stand x 30 18" + Ball Lifts   side steps inside parallel bar YTB 3 laps    Standing heel raises 1x10     Bj participated in neuromuscular re-education activities to " "improve: Balance for 10 minutes. The following activities were included:    Static Standing on AirEx Feet Together EO  2x30"  Static Standing on AirEx Feet Apart EC    2x30"  Static standing on airex feet apart c/ head turns 2x30 secs each.  Step ups from foam to 6 in  B LE   2x30"  +Sit to stand from 18 in plyo throwing and catching PB 2x10  Standing marching w/ bar in // bars  1 min    NBOS trunk rotation passing ball to PT x 10 (NP)    Static Standing-Feet Together 2x1' - NP  NP - Stepping over purple noodle x 10 ea LE  Static Standing on BOSU Feet Together x 3' NP  Static Standing on BOSU Feet Apart x 3' NP  Semi-Tandem Standing 2x1' NP  Semi-Tandem Weight Shifting 2 x 30 NP    Bj participated in gait training to improve functional mobility and safety for 5 minutes, including:    Lap around gym x 1 with SBQC    90 ft x2  with SPC, SBA (seated rest break in between)      NP (no time):   +Gait training in // bars with single point cane x 4 laps NP  +Gait training in // bars with small base quad cane x 4 lapsNP  +50 ft with SBQC and S- vc for sequencing and placement of cane NP    Ambulating outside on concrete x 100 feet c/ SBQC and S NP  Ascending/descending ramp with SBQC and S NP  Curb c/ SBQC and S  NP      Home Exercises Provided and Patient Education Provided     Education provided:   Cont to perform HEP as provided.     Written Home Exercises Provided: Patient instructed to cont prior HEP.  Exercises were reviewed and Bj was able to demonstrate them prior to the end of the session.  Bj demonstrated fair  understanding of the education provided.     See EMR under Patient Instructions for exercises provided 06/09/2020.    Assessment   Bj performed well in today's session. Pt with appropriate challenge throughout LE there-ex this session. Addition of step ups from foam and sit to stands with throw/catch to challenge pt stability in dynamic standing. Pt with improve endurance to gait since " initial evaluation, able to ambulate around in within clinic using SBQC. Plan to formally re-assess progress next session.     Bj is progressing well towards his goals.   Pt prognosis is Good.     Pt will continue to benefit from skilled outpatient physical therapy to address the deficits listed in the problem list box on initial evaluation, provide pt/family education and to maximize pt's level of independence in the home and community environment.     Pt's spiritual, cultural and educational needs considered and pt agreeable to plan of care and goals.    Anticipated barriers to physical therapy: none    Goals:   Short Term Goals: 4 weeks   1. Pt to improve UE strength by > / = 1/2 MMT score in order to improve tolerance to daily activities. Goal In progress  7-  2. Pt to improve B LE strength by > 1/2 MMT score in order to improve functional indepdence. Goal in progress  7-  3. Pt to demonstrate improvement in gait speed to > / = m/s in order to demonstrate improved community ambulation Goal in progress 7-.  4. Pt to improve condition 4 of MSTCIB to > / = 20 seconds in order to improve safety. MET 8/25/2020  5. Pt to ambulate x 100 ft with LRAD* and with min A in order to demonstrate improved functional independence. MET 8/25/2020 - goal modified     Long Term Goals: 8 weeks   1. Pt to improve UE strength by > / = 1 MMT score in order to improve tolerance to daily activities.  2. Pt to improve B LE strength by > 1 MMT score in order to improve functional indepdence.   3. Pt to demonstrate improvement in self selected gait speed to > /  = 0.6 m/s with LRAD in order to demonstrate improved community ambulation. - updated 11/10/20  4. Pt to improve condition 4 of MSTCIB to 30 seconds in order to improve safety. MET 8/25/2020  5. Pt to ambulate x 100 ft with LRAD* independently in order to demonstrate improved functional independence. MET 9/23/2020 - goal modified    Plan      Plan of care  Certification: 6/9/2020 to 9/9/2020. EXTEND POC to 12/6/20     Outpatient Physical Therapy 2 times weekly for 8 weeks to include the following interventions: Gait Training, Manual Therapy, Moist Heat/ Ice, Neuromuscular Re-ed, Patient Education, Therapeutic Activites and Therapeutic Exercise.     Cont skilled PT session towards PT and patient's goals. Assess for plateau in 1 month.     Ligia Ibarra, PT      12/09/2020

## 2020-12-09 ENCOUNTER — CLINICAL SUPPORT (OUTPATIENT)
Dept: REHABILITATION | Facility: HOSPITAL | Age: 70
End: 2020-12-09
Attending: FAMILY MEDICINE
Payer: MEDICARE

## 2020-12-09 DIAGNOSIS — R53.1 DECREASED STRENGTH, ENDURANCE, AND MOBILITY: ICD-10-CM

## 2020-12-09 DIAGNOSIS — R26.89 BALANCE PROBLEM: ICD-10-CM

## 2020-12-09 DIAGNOSIS — R26.9 GAIT ABNORMALITY: ICD-10-CM

## 2020-12-09 DIAGNOSIS — R68.89 DECREASED STRENGTH, ENDURANCE, AND MOBILITY: ICD-10-CM

## 2020-12-09 DIAGNOSIS — Z74.09 DECREASED STRENGTH, ENDURANCE, AND MOBILITY: ICD-10-CM

## 2020-12-09 PROCEDURE — 97116 GAIT TRAINING THERAPY: CPT | Mod: PN

## 2020-12-11 NOTE — PROGRESS NOTES
"  Physical Therapy Daily Treatment Note     Name: Bj Pate Jr.  Clinic Number: [id number]    Therapy Diagnosis:   Encounter Diagnoses   Name Primary?    Decreased strength, endurance, and mobility     Gait abnormality     Balance problem      Physician: Lombard, Azikiwe K., MD    Visit Date: 12/9/2020    Physician Orders: PT Eval and Treat      Medical Diagnosis from Referral:   M1A.09X0 (ICD-10-CM) - Idiopathic chronic gout of multiple sites without tophus   R53.81 (ICD-10-CM) - Physical debility      Evaluation Date: 6/9/2020  Authorization Period Expiration: 6/10/2020  Plan of Care Expiration: 9/9/2020 (EXTEND TO 12/6/2020)  Visit # / Visits authorized: 2/8        PN due: 12/6/2020    Time In: 1330 pm   Time Out: 1415 pm  Total Time: 45  minutes  Total Billable Time: 15 minutes     Precautions: Standard and Diabetes    Subjective     Pt reports: overall doing well with no new concerns/complaints.     He was compliant with home exercise program.  Response to previous treatment: ongoing  Functional change: ongoing    Pain: 6/10  Location: Low back and shoulders     Objective      Bj received therapeutic exercises to develop strength for 30 minutes including:    NuStep Level 6 hills mode    7 min    Parallel Bars:  Standing Marches  2#   2x10  Standing hip abduction   2#   10x  Standing hip extension   2#   10x   Mini squat      2x10    Step ups 4 in step 0# on B LE   10x  Lateral step ups 6 in step 0# on B LE  2x10  Sit<>Stand x 30 18" + Ball Lifts     Matrix Machines:  Matrix leg press 55#                2x10  Matrix hip abd +55#     2x10  Matrix hip add +50#     2x10   Matrix hamstring curls 55#                              2x10  Matrix leg extension 50#    2x10  Matrix Rows 55#    2x10    NP:  Seated PF against GTB 2x10  Toe taps to 8 in c/ one UE support 2x10  side steps inside parallel bar YTB 3 laps  Standing heel raises 1x10     Bj participated in neuromuscular re-education activities to " "improve: Balance for 00 minutes. The following activities were included:    Static Standing on AirEx Feet Together EO  2x30"  Static Standing on AirEx Feet Apart EC    2x30"  Static standing on airex feet apart c/ head turns 2x30 secs each.  Step ups from foam to 6 in  B LE   2x30"  Sit to stand from 18 in plyo throwing and catching PB 2x10  Standing marching w/ bar in // bars  1 min    NBOS trunk rotation passing ball to PT x 10 (NP)    Static Standing-Feet Together 2x1' - NP  NP - Stepping over purple noodle x 10 ea LE  Static Standing on BOSU Feet Together x 3' NP  Static Standing on BOSU Feet Apart x 3' NP  Semi-Tandem Standing 2x1' NP  Semi-Tandem Weight Shifting 2 x 30 NP    Bj participated in gait training to improve functional mobility and safety for 15 minutes, including:    Lap around gym x 2 with SBQC    90 ft x2  with SPC, SBA (seated rest break in between)      NP (no time):   +Gait training in // bars with single point cane x 4 laps NP  +Gait training in // bars with small base quad cane x 4 lapsNP  +50 ft with SBQC and S- vc for sequencing and placement of cane NP    Ambulating outside on concrete x 100 feet c/ SBQC and S NP  Ascending/descending ramp with SBQC and S NP  Curb c/ SBQC and S  NP      Home Exercises Provided and Patient Education Provided     Education provided:   Cont to perform HEP as provided.     Written Home Exercises Provided: Patient instructed to cont prior HEP.  Exercises were reviewed and Bj was able to demonstrate them prior to the end of the session.  Bj demonstrated fair  understanding of the education provided.     See EMR under Patient Instructions for exercises provided 06/09/2020.    Assessment   Bj performed well in today's session. Pt complete x 2 laps around the gym this session with some increased fatigue noted. Pt with good challenge noted during LE weight machines. Pt progressing well with therapy at this time, continues to have limited endurance " and abnormalities with gait. However, pt continues to ambulate well with SBQC in home and community. Pt remains appropriate for therapy at this time.      Bj is progressing well towards his goals.   Pt prognosis is Good.     Pt will continue to benefit from skilled outpatient physical therapy to address the deficits listed in the problem list box on initial evaluation, provide pt/family education and to maximize pt's level of independence in the home and community environment.     Pt's spiritual, cultural and educational needs considered and pt agreeable to plan of care and goals.    Anticipated barriers to physical therapy: none    Goals:   Short Term Goals: 4 weeks   1. Pt to improve UE strength by > / = 1/2 MMT score in order to improve tolerance to daily activities. Goal In progress  7-  2. Pt to improve B LE strength by > 1/2 MMT score in order to improve functional indepdence. Goal in progress  7-  3. Pt to demonstrate improvement in gait speed to > / = m/s in order to demonstrate improved community ambulation Goal in progress 7-.  4. Pt to improve condition 4 of MSTCIB to > / = 20 seconds in order to improve safety. MET 8/25/2020  5. Pt to ambulate x 100 ft with LRAD* and with min A in order to demonstrate improved functional independence. MET 8/25/2020 - goal modified     Long Term Goals: 8 weeks   1. Pt to improve UE strength by > / = 1 MMT score in order to improve tolerance to daily activities.  2. Pt to improve B LE strength by > 1 MMT score in order to improve functional indepdence.   3. Pt to demonstrate improvement in self selected gait speed to > /  = 0.6 m/s with LRAD in order to demonstrate improved community ambulation. - updated 11/10/20  4. Pt to improve condition 4 of MSTCIB to 30 seconds in order to improve safety. MET 8/25/2020  5. Pt to ambulate x 100 ft with LRAD* independently in order to demonstrate improved functional independence. MET 9/23/2020 - goal  modified    Plan      Plan of care Certification: 6/9/2020 to 9/9/2020. EXTEND POC to 12/6/20     Outpatient Physical Therapy 2 times weekly for 8 weeks to include the following interventions: Gait Training, Manual Therapy, Moist Heat/ Ice, Neuromuscular Re-ed, Patient Education, Therapeutic Activites and Therapeutic Exercise.     Cont skilled PT session towards PT and patient's goals. Assess for plateau in 1 month.     Ligia Ibarra, PT    12/11/2020

## 2020-12-14 ENCOUNTER — CLINICAL SUPPORT (OUTPATIENT)
Dept: REHABILITATION | Facility: HOSPITAL | Age: 70
End: 2020-12-14
Attending: FAMILY MEDICINE
Payer: MEDICARE

## 2020-12-14 DIAGNOSIS — R26.89 BALANCE PROBLEM: ICD-10-CM

## 2020-12-14 DIAGNOSIS — R68.89 DECREASED STRENGTH, ENDURANCE, AND MOBILITY: ICD-10-CM

## 2020-12-14 DIAGNOSIS — R53.1 DECREASED STRENGTH, ENDURANCE, AND MOBILITY: ICD-10-CM

## 2020-12-14 DIAGNOSIS — Z74.09 DECREASED STRENGTH, ENDURANCE, AND MOBILITY: ICD-10-CM

## 2020-12-14 DIAGNOSIS — R26.9 GAIT ABNORMALITY: ICD-10-CM

## 2020-12-14 PROCEDURE — 97116 GAIT TRAINING THERAPY: CPT | Mod: PN

## 2020-12-14 PROCEDURE — 97110 THERAPEUTIC EXERCISES: CPT | Mod: PN

## 2020-12-14 NOTE — PROGRESS NOTES
Physical Therapy Daily Treatment Note     Name: Bj Pate Jr.  Clinic Number: [id number]    Therapy Diagnosis:   Encounter Diagnoses   Name Primary?    Decreased strength, endurance, and mobility     Gait abnormality     Balance problem      Physician: Lombard, Azikiwe K., MD    Visit Date: 12/14/2020    Physician Orders: PT Eval and Treat      Medical Diagnosis from Referral:   M1A.09X0 (ICD-10-CM) - Idiopathic chronic gout of multiple sites without tophus   R53.81 (ICD-10-CM) - Physical debility      Evaluation Date: 6/9/2020  Authorization Period Expiration: 6/10/2020  Plan of Care Expiration: 12/6/20 (EXTEND TO 2/6/2021)  Visit # / Visits authorized: 3/8        PN due: 1/14/21    Time In: 1415    Time Out: 1500   Total Time: 45  minutes  Total Billable Time: 45 minutes     Precautions: Standard and Diabetes    Subjective     Pt reports: overall doing well with no new concerns/complaints.     He was compliant with home exercise program.  Response to previous treatment: ongoing  Functional change: ongoing    Pain: 6/10  Location: Low back and shoulders     Objective         Lower Extremity Strength    RLE LLE   Hip Flexion: 5/5 5/5   Hip Extension:  NT NT   Hip Abduction: 5/5 5/5   Hip Adduction: 5/5 5/5   Knee Extension: 5/5 5/5   Knee Flexion: 4+/5 5/5   Ankle Dorsiflexion: 5/5 5/5          Evaluation   Single Limb Stance R LE < 1 sec  (<10 sec = HIGH FALL RISK)   Single Limb Stance L LE < 1 sec  (<10 sec = HIGH FALL RISK)   5 times sit-stand 11 seconds (17 in)  >12 sec= fall risk for general elderly  >16 sec= fall risk for Parkinson's disease  >10 sec= balance/vestibular dysfunction (<59 y/o)  >14.2 sec= balance/vestibular dysfunction (>59 y/o)  >12 sec= fall risk for CVA      Gait Assessment:   - AD used: RW, 2 wheel  - Assistance: mod I  - Distance: 300 ft     GAIT DEVIATIONS:   Decreased step length bilaterally  Forward trunk lean throughout gait  Reliance on UE support  Decreased gait speed  Decreased  "knee flexion throughout swing phase bilaterally     * Above impairments indicate decreased gait safety and increased fall risk.     Impairments contributing to deviations: impaired motor control, decreased endurance, decreased ROM, impaired strength of LEs     Endurance Deficit: yes        9/23/20 11/6/20 12/14/20   Timed Up and Go  20.3 sec (21 + 20 + 20)  < 20 sec safe for independent transfers,     < 30 sec assist required for transfers 22.3 (23+22+22) 20.6 (21+20+21)   Self-Selected Gait Speed 0.54 m/sec (6m/11s) 0.46 m/s (6/13 sec) 0.5 m/s (6/12s)   Fast Gait Speed 0.6 m/sec (6/10s) 0.46 m/s (6/13 sec) 0.54 m/s (6/11s)        Bj received therapeutic exercises to develop strength for 37 minutes including:    NuStep Level 6 hills mode    7 min    Parallel Bars:  Standing Marches  2#   2x10  Standing hip abduction   2#   10x  Standing hip extension   2#   10x   Mini squat      2x10    Step ups 4 in step 0# on B LE   10x  Lateral step ups 6 in step 0# on B LE  2x10  Sit<>Stand x 30 18" + Ball Lifts     Matrix Machines:  Matrix leg press 55#                2x10  Matrix hip abd +55#     2x10  Matrix hip add +50#     2x10   Matrix hamstring curls 55#                              2x10  Matrix leg extension 50#    2x10  Matrix Rows 55#    2x10    NP:  Seated PF against GTB 2x10  Toe taps to 8 in c/ one UE support 2x10  side steps inside parallel bar YTB 3 laps  Standing heel raises 1x10     Bj participated in neuromuscular re-education activities to improve: Balance for 00 minutes. The following activities were included:    Static Standing on AirEx Feet Together EO  2x30"  Static Standing on AirEx Feet Apart EC    2x30"  Static standing on airex feet apart c/ head turns 2x30 secs each.  Step ups from foam to 6 in  B LE   2x30"  Sit to stand from 18 in plyo throwing and catching PB 2x10  Standing marching w/ bar in // bars  1 min    NBOS trunk rotation passing ball to PT x 10 (NP)    Static Standing-Feet Together " 2x1' - NP  NP - Stepping over purple noodle x 10 ea LE  Static Standing on BOSU Feet Together x 3' NP  Static Standing on BOSU Feet Apart x 3' NP  Semi-Tandem Standing 2x1' NP  Semi-Tandem Weight Shifting 2 x 30 NP    Bj participated in gait training to improve functional mobility and safety for 8 minutes, including:    Lap around gym x 1 with SBQC    90 ft x2  with SPC, SBA (seated rest break in between)      NP (no time):   +Gait training in // bars with single point cane x 4 laps NP  +Gait training in // bars with small base quad cane x 4 lapsNP  +50 ft with SBQC and S- vc for sequencing and placement of cane NP    Ambulating outside on concrete x 100 feet c/ SBQC and S NP  Ascending/descending ramp with SBQC and S NP  Curb c/ SBQC and S  NP      Home Exercises Provided and Patient Education Provided     Education provided:   Cont to perform HEP as provided.     Written Home Exercises Provided: Patient instructed to cont prior HEP.  Exercises were reviewed and Bj was able to demonstrate them prior to the end of the session.  Bj demonstrated fair  understanding of the education provided.     See EMR under Patient Instructions for exercises provided 06/09/2020.    Assessment   Bj performed well in today's session. Pt progress note performed this session. Pt with improvements noted in self-selected and fast gait speed, TUG score since last re-assessment demonstrating improvements in functional strength and mobility. Pt with significant improvements in LE strength, functional mobility, endurance, and gait since beginning therapy. However, pt beginning to plateau over last 2 re-assessments despite increase in measurements today. Pt continues to benefit from ongoing strengthening, endurance training, CV training to prevent decline and to continue improvements in LE strength and functional mobility. Pt would benefit from continued PT 1x per week for 1-2 months to assist in transition to independent  management of exercise program in a fitness setting moving forward. Pt may also benefit from a medical wellness program to assist with monitoring of strengthening, endurance training. Plan to extend POC x 2 months to address continued deficits and progress toward independent management of fitness program.     Bj is progressing well towards his goals.   Pt prognosis is Good.     Pt will continue to benefit from skilled outpatient physical therapy to address the deficits listed in the problem list box on initial evaluation, provide pt/family education and to maximize pt's level of independence in the home and community environment.     Pt's spiritual, cultural and educational needs considered and pt agreeable to plan of care and goals.    Anticipated barriers to physical therapy: none    Goals:   Short Term Goals: 4 weeks   1. Pt to improve UE strength by > / = 1/2 MMT score in order to improve tolerance to daily activities. Goal In progress  7-  2. Pt to improve B LE strength by > 1/2 MMT score in order to improve functional indepdence. Goal in progress  7-  3. Pt to demonstrate improvement in gait speed to > / = m/s in order to demonstrate improved community ambulation Goal in progress 7-.  4. Pt to improve condition 4 of MSTCIB to > / = 20 seconds in order to improve safety. MET 8/25/2020  5. Pt to ambulate x 100 ft with LRAD* and with min A in order to demonstrate improved functional independence. MET 8/25/2020 - goal modified     Long Term Goals: 8 weeks   1. Pt to improve UE strength by > / = 1 MMT score in order to improve tolerance to daily activities.  2. Pt to improve B LE strength by > 1 MMT score in order to improve functional indepdence.   3. Pt to demonstrate improvement in self selected gait speed to > /  = 0.6 m/s with LRAD in order to demonstrate improved community ambulation. - updated 11/10/20  4. Pt to improve condition 4 of MSTCIB to 30 seconds in order to improve safety.  MET 8/25/2020  5. Pt to ambulate x 100 ft with LRAD* independently in order to demonstrate improved functional independence. MET 9/23/2020 - goal modified    Plan      Plan of care Certification: 6/9/2020 to 9/9/2020. EXTEND POC to 2/6/2021     Outpatient Physical Therapy 2 times weekly for 8 weeks to include the following interventions: Gait Training, Manual Therapy, Moist Heat/ Ice, Neuromuscular Re-ed, Patient Education, Therapeutic Activites and Therapeutic Exercise.     Cont skilled PT session towards PT and patient's goals. Assess for plateau in 1 month.     Ligia Ibarra, PT    12/16/2020

## 2020-12-15 NOTE — PROGRESS NOTES
"  Physical Therapy Daily Treatment Note     Name: Bj Pate Jr.  Clinic Number: [id number]    Therapy Diagnosis:   Encounter Diagnoses   Name Primary?    Decreased strength, endurance, and mobility Yes    Gait abnormality     Balance problem      Physician: Lombard, Azikiwe K., MD    Visit Date: 12/16/2020    Physician Orders: PT Eval and Treat      Medical Diagnosis from Referral:   M1A.09X0 (ICD-10-CM) - Idiopathic chronic gout of multiple sites without tophus   R53.81 (ICD-10-CM) - Physical debility      Evaluation Date: 6/9/2020  Authorization Period Expiration: 6/10/2020  Plan of Care Expiration: 9/9/2020 (EXTEND TO 12/6/2020)  Visit # / Visits authorized: 3/8        PN due: 12/6/2020    Time In: 1415  Time Out: 1500  Total Time: 45  minutes  Total Billable Time: 15 minutes     Precautions: Standard and Diabetes    Subjective     Pt reports: overall doing well with no new concerns/complaints.     He was compliant with home exercise program.  Response to previous treatment: ongoing  Functional change: ongoing    Pain: 6/10  Location: Low back and shoulders     Objective        Bj received therapeutic exercises to develop strength for 30 minutes including:    NuStep Level 6 hills mode    7 min    Parallel Bars:  Standing Marches  2#   2x10  Standing hip abduction   2#   10x  Standing hip extension   2#   10x   Mini squat      2x10    Step ups 4 in step 0# on B LE   10x  Lateral step ups 6 in step 0# on B LE  2x10  Sit<>Stand x 30 18" + Ball Lifts     Matrix Machines:  Matrix leg press 55#                2x10  Matrix hip abd +55#     2x10  Matrix hip add +55#     2x10   Matrix hamstring curls 65#                              2x10  Matrix leg extension 55#    2x10  Matrix Rows 65#    2x10    NP:  Seated PF against GTB 2x10  Toe taps to 8 in c/ one UE support 2x10  side steps inside parallel bar YTB 3 laps  Standing heel raises 1x10     Bj participated in neuromuscular re-education activities to improve: " "Balance for 00 minutes. The following activities were included:    Static Standing on AirEx Feet Together EO  2x30"  Static Standing on AirEx Feet Apart EC    2x30"  Static standing on airex feet apart c/ head turns 2x30 secs each.  Step ups from foam to 6 in  B LE   2x30"  Sit to stand from 18 in plyo throwing and catching PB 2x10  Standing marching w/ bar in // bars  1 min    NBOS trunk rotation passing ball to PT x 10 (NP)    Static Standing-Feet Together 2x1' - NP  NP - Stepping over purple noodle x 10 ea LE  Static Standing on BOSU Feet Together x 3' NP  Static Standing on BOSU Feet Apart x 3' NP  Semi-Tandem Standing 2x1' NP  Semi-Tandem Weight Shifting 2 x 30 NP    Bj participated in gait training to improve functional mobility and safety for 15 minutes, including:    Lap around gym x 2 with SBQC    90 ft x2  with SPC, SBA (seated rest break in between)      NP (no time):   +Gait training in // bars with single point cane x 4 laps NP  +Gait training in // bars with small base quad cane x 4 lapsNP  +50 ft with SBQC and S- vc for sequencing and placement of cane NP    Ambulating outside on concrete x 100 feet c/ SBQC and S NP  Ascending/descending ramp with SBQC and S NP  Curb c/ SBQC and S  NP      Home Exercises Provided and Patient Education Provided     Education provided:   Cont to perform HEP as provided.     Written Home Exercises Provided: Patient instructed to cont prior HEP.  Exercises were reviewed and Bj was able to demonstrate them prior to the end of the session.  Bj demonstrated fair  understanding of the education provided.     See EMR under Patient Instructions for exercises provided 06/09/2020.    Assessment   Bj performed well in today's session. Pt complete x 2 laps around the gym this session with some increased fatigue noted. Pt was able to progress with all matrix exercises with increased repetitions. Pt required standing rest breaks between standing exercises with two " seated breaks throughout entire session.    Bj is progressing well towards his goals.   Pt prognosis is Good.     Pt will continue to benefit from skilled outpatient physical therapy to address the deficits listed in the problem list box on initial evaluation, provide pt/family education and to maximize pt's level of independence in the home and community environment.     Pt's spiritual, cultural and educational needs considered and pt agreeable to plan of care and goals.    Anticipated barriers to physical therapy: none    Goals:   Short Term Goals: 4 weeks   1. Pt to improve UE strength by > / = 1/2 MMT score in order to improve tolerance to daily activities. Goal In progress  7-  2. Pt to improve B LE strength by > 1/2 MMT score in order to improve functional indepdence. Goal in progress  7-  3. Pt to demonstrate improvement in gait speed to > / = m/s in order to demonstrate improved community ambulation Goal in progress 7-.  4. Pt to improve condition 4 of MSTCIB to > / = 20 seconds in order to improve safety. MET 8/25/2020  5. Pt to ambulate x 100 ft with LRAD* and with min A in order to demonstrate improved functional independence. MET 8/25/2020 - goal modified     Long Term Goals: 8 weeks   1. Pt to improve UE strength by > / = 1 MMT score in order to improve tolerance to daily activities.  2. Pt to improve B LE strength by > 1 MMT score in order to improve functional indepdence.   3. Pt to demonstrate improvement in self selected gait speed to > /  = 0.6 m/s with LRAD in order to demonstrate improved community ambulation. - updated 11/10/20  4. Pt to improve condition 4 of MSTCIB to 30 seconds in order to improve safety. MET 8/25/2020  5. Pt to ambulate x 100 ft with LRAD* independently in order to demonstrate improved functional independence. MET 9/23/2020 - goal modified    Plan      Plan of care Certification: 6/9/2020 to 9/9/2020. EXTEND POC to 12/6/20     Outpatient Physical  Therapy 2 times weekly for 8 weeks to include the following interventions: Gait Training, Manual Therapy, Moist Heat/ Ice, Neuromuscular Re-ed, Patient Education, Therapeutic Activites and Therapeutic Exercise.     Cont skilled PT session towards PT and patient's goals. Assess for plateau in 1 month.     Lorenzo Capps, PTA    12/15/2020

## 2020-12-16 ENCOUNTER — CLINICAL SUPPORT (OUTPATIENT)
Dept: REHABILITATION | Facility: HOSPITAL | Age: 70
End: 2020-12-16
Attending: FAMILY MEDICINE
Payer: MEDICARE

## 2020-12-16 DIAGNOSIS — R26.9 GAIT ABNORMALITY: ICD-10-CM

## 2020-12-16 DIAGNOSIS — R53.1 DECREASED STRENGTH, ENDURANCE, AND MOBILITY: Primary | ICD-10-CM

## 2020-12-16 DIAGNOSIS — Z74.09 DECREASED STRENGTH, ENDURANCE, AND MOBILITY: Primary | ICD-10-CM

## 2020-12-16 DIAGNOSIS — R26.89 BALANCE PROBLEM: ICD-10-CM

## 2020-12-16 DIAGNOSIS — R68.89 DECREASED STRENGTH, ENDURANCE, AND MOBILITY: Primary | ICD-10-CM

## 2020-12-16 PROCEDURE — 97110 THERAPEUTIC EXERCISES: CPT | Mod: PN,CQ

## 2020-12-22 DIAGNOSIS — J44.9 COPD, MODERATE: ICD-10-CM

## 2020-12-22 RX ORDER — FLUTICASONE PROPIONATE AND SALMETEROL 50; 250 UG/1; UG/1
1 POWDER RESPIRATORY (INHALATION) 2 TIMES DAILY
Qty: 60 EACH | Refills: 3 | Status: SHIPPED | OUTPATIENT
Start: 2020-12-22 | End: 2021-05-07

## 2020-12-22 NOTE — TELEPHONE ENCOUNTER
----- Message from Maria Luisa Anna sent at 12/22/2020 11:55 AM CST -----  Regarding: Self/  [phone]  Type: RX Refill Request    Who Called:  Patient    Refill or New Rx:  Refill    RX Name and Strength:  ADVAIR DISKUS 250-50 mcg/dose diskus inhaler    Preferred Pharmacy with phone number:  [id number] [address]    Local or Mail Order:  Local    Ordering Provider:  Lombard. A    Would the patient rather a call back or a response via [facility]?   Call back    Best Call Back Number:  [phone]    Additional Information:

## 2020-12-28 ENCOUNTER — CLINICAL SUPPORT (OUTPATIENT)
Dept: REHABILITATION | Facility: HOSPITAL | Age: 70
End: 2020-12-28
Attending: FAMILY MEDICINE
Payer: MEDICARE

## 2020-12-28 DIAGNOSIS — Z78.9 ALTERATION IN INSTRUMENTAL ACTIVITIES OF DAILY LIVING (IADL): ICD-10-CM

## 2020-12-28 DIAGNOSIS — M54.12 CERVICAL RADICULOPATHY: ICD-10-CM

## 2020-12-28 DIAGNOSIS — M25.611 DECREASED ROM OF RIGHT SHOULDER: ICD-10-CM

## 2020-12-28 DIAGNOSIS — R68.89 DECREASED STRENGTH, ENDURANCE, AND MOBILITY: Primary | ICD-10-CM

## 2020-12-28 DIAGNOSIS — R26.89 BALANCE PROBLEM: ICD-10-CM

## 2020-12-28 DIAGNOSIS — Z74.09 DECREASED STRENGTH, ENDURANCE, AND MOBILITY: Primary | ICD-10-CM

## 2020-12-28 DIAGNOSIS — G56.03 CARPAL TUNNEL SYNDROME ON BOTH SIDES: ICD-10-CM

## 2020-12-28 DIAGNOSIS — R29.898 DECREASED GRIP STRENGTH: ICD-10-CM

## 2020-12-28 DIAGNOSIS — M25.612 DECREASED ROM OF LEFT SHOULDER: ICD-10-CM

## 2020-12-28 DIAGNOSIS — R26.9 GAIT ABNORMALITY: ICD-10-CM

## 2020-12-28 DIAGNOSIS — R53.1 DECREASED STRENGTH, ENDURANCE, AND MOBILITY: Primary | ICD-10-CM

## 2020-12-28 PROCEDURE — 97110 THERAPEUTIC EXERCISES: CPT | Mod: PN

## 2020-12-28 PROCEDURE — 97110 THERAPEUTIC EXERCISES: CPT | Mod: PN,CQ

## 2020-12-28 NOTE — PROGRESS NOTES
"  Occupational Therapy Treatment Note      Date: 12/28/2020  Name: Bj Pate Jr.  Clinic Number: [id number]    Therapy Diagnosis:   Encounter Diagnoses   Name Primary?    Decreased ROM of left shoulder     Decreased ROM of right shoulder     Alteration in instrumental activities of daily living (IADL)     Decreased  strength      Physician: Lombard, Azikiwe K., MD    Physician Orders: OT Eval and treat  Medical Diagnosis: CVA  Evaluation Date: 7/24/2020  Plan of Care Expiration Period: 10/2/20 to 12/22/20  Insurance Authorization period Expiration: 9/15/20  Date of Return to MD: TBD  Visit # / Visits Authorized: 20 /30  FOTO: 5th visit     Time In: 1353 pm  Time Out: 1438 pm  Total Billable (one on one) Time: 45 minutes     Precautions: Standard and Fall    Subjective     Pt reports: "I've been so busy. Something always come up. Either I'm at my doctor's appointment or run errands for someone else"  He was compliant with HEP  Response to previous treatment: positive   Functional change: Improved shoulder movement     Pain: 5/10   Location: generalized     Objective     Bj received therapeutic exercises for 45 minutes including:  - Pt provided with green theraputty and instructed to perform the following exercises for B hands:   Molding x 3 min   Grasps 10 x 3 second holds   EDC extension x 5 reps              logs - 2pt and 3pt pinch     Home Exercises and Education Provided     Education provided:   - encouraged him to perform putty HEP  - Progress towards goals     Written Home Exercises Provided: continue with previously instructed HEP  Exercises were reviewed and Bj was able to demonstrate them prior to the end of the session.  Bj demonstrated good  understanding of the HEP provided.   .   See EMR under Patient Instructions for exercises provided 8/5/20.        Assessment     Pt had a fair session mostly occupied with hand exercises and learning patient's priorities for treatment.  Pt's " motivation continues and potential to improve remains. Pt remains appropriate for skilled OT to address strengthening and shoulder AROM limitations.    Bj is progressing slowly towards his goals and there are no updates to goals at this time. Pt prognosis is Good.     Pt will continue to benefit from skilled outpatient occupational therapy to address the deficits listed in the problem list on initial evaluation provide pt/family education and to maximize pt's level of independence in the home and community environment.     Anticipated barriers to occupational therapy: None    Pt's spiritual, cultural and educational needs considered and pt agreeable to plan of care and goals.    Goals:  Short Term Goals: 5 weeks   - Pt. Will be educated in HEP  MET  - Pt. Will have improved b/l shoulder AROM in FF, Abd, and ext if at least 10 degrees for easier dressing and bathing MET  - Pt. Will report no greater than 4/10 pain over 4 consecutive tx sessions  - Pt. Will have improved b/l  strength of at least 5# for increased functional hand use  - Pt. Will complete fine motor and pinch testing and goals will be established if appropriate MET     Long Term Goals: 10 weeks   - Pt. Will be independent with HEP MET  - Pt. Will have improve L AROM ER of at least 10 degrees MET  - Pt. Will have improved b/l shoulder AROM in FF, Abd, and ext if at least 15 degrees for easier dressing and bathing  - Pt. Will report improved hand function for home care tasks  - Pt. Will have improved 9 HPT of no greater than 50 sec for improved ability to complete buttoning MET        Plan   Certification Period/Plan of care expiration: 7/24/2020 to 10/2/20 to 12/22/20     Outpatient Occupational Therapy 2 times weekly for 10 weeks to include the following interventions: Manual Therapy, Moist Heat/ Ice, Paraffin, Patient Education, Self Care, Therapeutic Activites and Therapeutic Exercise.       Updates/Grading for next session: as tolerated        REGINA LEONG, OT

## 2020-12-28 NOTE — PROGRESS NOTES
"  Physical Therapy Daily Treatment Note     Name: Bj Pate Jr.  Clinic Number: [id number]    Therapy Diagnosis:   Encounter Diagnoses   Name Primary?    Decreased strength, endurance, and mobility Yes    Gait abnormality     Balance problem      Physician: Lombard, Azikiwe K., MD    Visit Date: 12/28/2020    Physician Orders: PT Eval and Treat      Medical Diagnosis from Referral:   M1A.09X0 (ICD-10-CM) - Idiopathic chronic gout of multiple sites without tophus   R53.81 (ICD-10-CM) - Physical debility      Evaluation Date: 6/9/2020  Authorization Period Expiration: 6/10/2020  Plan of Care Expiration: 9/9/2020 (EXTEND TO 12/6/2020)  Visit # / Visits authorized: 4/8        PN due: 12/6/2020    Time In: 1445  Time Out: 1530  Total Time: 45  minutes  Total Billable Time: 15 minutes     Precautions: Standard and Diabetes    Subjective     Pt reports: he is doing great     He was compliant with home exercise program.  Response to previous treatment: ongoing  Functional change: ongoing    Pain: 6/10  Location: Low back and shoulders     Objective        Bj received therapeutic exercises to develop strength for 30 minutes including:    NuStep Level 6 hills mode    7 min    Parallel Bars:  Standing Marches  2#   2x10  Standing hip abduction   2#   2x10  Standing hip extension   2#   2x10  Mini squat      2x10    Step ups 4 in step 0# on B LE   10x  Lateral step ups 6 in step 0# on B LE  2x10  Sit<>Stand x 30 18" + Ball Lifts     Matrix Machines:  Matrix leg press 55#                2x10  Matrix hip abd +60#     2x10  Matrix hip add +60#     2x10   Matrix hamstring curls 65#                              2x10  Matrix leg extension 55#    2x10  Matrix Rows 65#    2x10    NP:  Seated PF against GTB 2x10  Toe taps to 8 in c/ one UE support 2x10  side steps inside parallel bar YTB 3 laps  Standing heel raises 1x10     Bj participated in neuromuscular re-education activities to improve: Balance for 00 minutes. The " "following activities were included:    Static Standing on AirEx Feet Together EO  2x30"  Static Standing on AirEx Feet Apart EC    2x30"  Static standing on airex feet apart c/ head turns 2x30 secs each.  Step ups from foam to 6 in  B LE   2x30"  Sit to stand from 18 in plyo throwing and catching PB 2x10  Standing marching w/ bar in // bars  1 min    NBOS trunk rotation passing ball to PT x 10 (NP)    Static Standing-Feet Together 2x1' - NP  NP - Stepping over purple noodle x 10 ea LE  Static Standing on BOSU Feet Together x 3' NP  Static Standing on BOSU Feet Apart x 3' NP  Semi-Tandem Standing 2x1' NP  Semi-Tandem Weight Shifting 2 x 30 NP    Bj participated in gait training to improve functional mobility and safety for 15 minutes, including:    Lap around gym x 2 with SBQC    90 ft x2  with SPC, SBA (seated rest break in between)      NP (no time):   +Gait training in // bars with single point cane x 4 laps NP  +Gait training in // bars with small base quad cane x 4 lapsNP  +50 ft with SBQC and S- vc for sequencing and placement of cane NP    Ambulating outside on concrete x 100 feet c/ SBQC and S NP  Ascending/descending ramp with SBQC and S NP  Curb c/ SBQC and S  NP      Home Exercises Provided and Patient Education Provided     Education provided:   Cont to perform HEP as provided.     Written Home Exercises Provided: Patient instructed to cont prior HEP.  Exercises were reviewed and Bj was able to demonstrate them prior to the end of the session.  Bj demonstrated fair  understanding of the education provided.     See EMR under Patient Instructions for exercises provided 06/09/2020.    Assessment   Bj performed well in today's session. Pt was able to progress with repetitions for standing exercises in // bars. Pt only required one seated rest break with standing exercises. Improved form with sit to stand with ball today, able to perform 30 reps with small rest break after 10.  Pt progressed " with all matrix machine exercises today.     Bj is progressing well towards his goals.   Pt prognosis is Good.     Pt will continue to benefit from skilled outpatient physical therapy to address the deficits listed in the problem list box on initial evaluation, provide pt/family education and to maximize pt's level of independence in the home and community environment.     Pt's spiritual, cultural and educational needs considered and pt agreeable to plan of care and goals.    Anticipated barriers to physical therapy: none    Goals:   Short Term Goals: 4 weeks   1. Pt to improve UE strength by > / = 1/2 MMT score in order to improve tolerance to daily activities. Goal In progress  7-  2. Pt to improve B LE strength by > 1/2 MMT score in order to improve functional indepdence. Goal in progress  7-  3. Pt to demonstrate improvement in gait speed to > / = m/s in order to demonstrate improved community ambulation Goal in progress 7-.  4. Pt to improve condition 4 of MSTCIB to > / = 20 seconds in order to improve safety. MET 8/25/2020  5. Pt to ambulate x 100 ft with LRAD* and with min A in order to demonstrate improved functional independence. MET 8/25/2020 - goal modified     Long Term Goals: 8 weeks   1. Pt to improve UE strength by > / = 1 MMT score in order to improve tolerance to daily activities.  2. Pt to improve B LE strength by > 1 MMT score in order to improve functional indepdence.   3. Pt to demonstrate improvement in self selected gait speed to > /  = 0.6 m/s with LRAD in order to demonstrate improved community ambulation. - updated 11/10/20  4. Pt to improve condition 4 of MSTCIB to 30 seconds in order to improve safety. MET 8/25/2020  5. Pt to ambulate x 100 ft with LRAD* independently in order to demonstrate improved functional independence. MET 9/23/2020 - goal modified    Plan      Plan of care Certification: 6/9/2020 to 9/9/2020. EXTEND POC to 12/6/20     Outpatient Physical  Therapy 2 times weekly for 8 weeks to include the following interventions: Gait Training, Manual Therapy, Moist Heat/ Ice, Neuromuscular Re-ed, Patient Education, Therapeutic Activites and Therapeutic Exercise.     Cont skilled PT session towards PT and patient's goals. Assess for plateau in 1 month.     Lorenzo Capps, PTA    12/28/2020

## 2020-12-30 ENCOUNTER — CLINICAL SUPPORT (OUTPATIENT)
Dept: REHABILITATION | Facility: HOSPITAL | Age: 70
End: 2020-12-30
Attending: FAMILY MEDICINE
Payer: MEDICARE

## 2020-12-30 DIAGNOSIS — R26.9 GAIT ABNORMALITY: ICD-10-CM

## 2020-12-30 DIAGNOSIS — Z74.09 DECREASED STRENGTH, ENDURANCE, AND MOBILITY: ICD-10-CM

## 2020-12-30 DIAGNOSIS — R26.89 BALANCE PROBLEM: ICD-10-CM

## 2020-12-30 DIAGNOSIS — R68.89 DECREASED STRENGTH, ENDURANCE, AND MOBILITY: ICD-10-CM

## 2020-12-30 DIAGNOSIS — R53.1 DECREASED STRENGTH, ENDURANCE, AND MOBILITY: ICD-10-CM

## 2020-12-30 PROCEDURE — 97116 GAIT TRAINING THERAPY: CPT | Mod: PN

## 2020-12-30 PROCEDURE — 97110 THERAPEUTIC EXERCISES: CPT | Mod: PN

## 2020-12-30 NOTE — PROGRESS NOTES
"  Physical Therapy Daily Treatment Note     Name: Bj Pate Jr.  Clinic Number: [id number]    Therapy Diagnosis:   Encounter Diagnoses   Name Primary?   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 States he still feels like he leans forward when walking/standing.     He was compliant with home exercise program.  Response to previous treatment: ongoing  Functional change: ongoing    Pain: 6/10  Location: Low back and shoulders     Objective        Bj received therapeutic exercises to develop strength for 35 minutes including:    NuStep Level 6 hills mode    7 min    Parallel Bars:  Standing Marches  2#               2x10  Standing hip abduction   2#               2x10  Standing hip extension   2#   2x10  Mini squat      2x10    Step ups 4 in step 0# on B LE   10x  Lateral step ups 6 in step 0# on B LE  2x10    Sit<>Stand x 20 18" + Ball Lifts with throw and catch   Standing trunk extension against mat 2x10    Matrix Machines:  Matrix leg press +60#    2x10  +Core Extension (0-45) 40#                         2x15  +Core Rotation 30#                                      15 ea  Matrix hip abd +60#     2x10  Matrix hip add +60#     2x10   Matrix hamstring curls 65#                    " "         2x10  Matrix leg extension 55#    2x10  Matrix Rows 65#    2x10    NP:  Seated PF against GTB 2x10  Toe taps to 8 in c/ one UE support 2x10  side steps inside parallel bar YTB 3 laps  Standing heel raises 1x10     Bj participated in neuromuscular re-education activities to improve: Balance for 00 minutes. The following activities were included:    Static Standing on AirEx Feet Together EO  2x30"  Static Standing on AirEx Feet Apart EC    2x30"  Static standing on airex feet apart c/ head turns 2x30 secs each.  Step ups from foam to 6 in  B LE   2x30"  Sit to stand from 18 in plyo throwing and catching PB 2x10  Standing marching w/ bar in // bars  1 min    NBOS trunk rotation passing ball to PT x 10 (NP)    Static Standing-Feet Together 2x1' - NP  NP - Stepping over purple noodle x 10 ea LE  Static Standing on BOSU Feet Together x 3' NP  Static Standing on BOSU Feet Apart x 3' NP  Semi-Tandem Standing 2x1' NP  Semi-Tandem Weight Shifting 2 x 30 NP    Bj participated in gait training to improve functional mobility and safety for 10 minutes, including:    Lap around gym x 1 with SBQC    90 ft x2  with SPC, SBA (seated rest break in between)      NP (no time):   +Gait training in // bars with single point cane x 4 laps NP  +Gait training in // bars with small base quad cane x 4 lapsNP  +50 ft with SBQC and S- vc for sequencing and placement of cane NP    Ambulating outside on concrete x 100 feet c/ SBQC and S NP  Ascending/descending ramp with SBQC and S NP  Curb c/ SBQC and S  NP      Home Exercises Provided and Patient Education Provided     Education provided:   Cont to perform HEP as provided.     Written Home Exercises Provided: Patient instructed to cont prior HEP.  Exercises were reviewed and Bj was able to demonstrate them prior to the end of the session.  Bj demonstrated fair  understanding of the education provided.     See EMR under Patient Instructions for exercises provided " 06/09/2020.    Assessment   Bj performed well in today's session. Addition of exercises for low back today secondary to pt forward trunk lean increasing with fatigue during gait. Pt tolerated all exercises well without increase in pain. Pt requiring some vc during extension in standing to isolate trunk extension vs posterior weight shift. Pt with appropriate muscular fatigue on trunk extension and rotation core machines. Pt with good progress with therapy overall. Plan to continue to progress standing LE strengthening and dynamic balance activities with addition of core/trunk strengthening.     Bj is progressing well towards his goals.   Pt prognosis is Good.     Pt will continue to benefit from skilled outpatient physical therapy to address the deficits listed in the problem list box on initial evaluation, provide pt/family education and to maximize pt's level of independence in the home and community environment.     Pt's spiritual, cultural and educational needs considered and pt agreeable to plan of care and goals.    Anticipated barriers to physical therapy: none    Goals:   Short Term Goals: 4 weeks   1. Pt to improve UE strength by > / = 1/2 MMT score in order to improve tolerance to daily activities. Goal In progress  7-  2. Pt to improve B LE strength by > 1/2 MMT score in order to improve functional indepdence. Goal in progress  7-  3. Pt to demonstrate improvement in gait speed to > / = m/s in order to demonstrate improved community ambulation Goal in progress 7-.  4. Pt to improve condition 4 of MSTCIB to > / = 20 seconds in order to improve safety. MET 8/25/2020  5. Pt to ambulate x 100 ft with LRAD* and with min A in order to demonstrate improved functional independence. MET 8/25/2020 - goal modified     Long Term Goals: 8 weeks   1. Pt to improve UE strength by > / = 1 MMT score in order to improve tolerance to daily activities.  2. Pt to improve B LE strength by > 1 MMT  score in order to improve functional indepdence.   3. Pt to demonstrate improvement in self selected gait speed to > /  = 0.6 m/s with LRAD in order to demonstrate improved community ambulation. - updated 11/10/20  4. Pt to improve condition 4 of MSTCIB to 30 seconds in order to improve safety. MET 8/25/2020  5. Pt to ambulate x 100 ft with LRAD* independently in order to demonstrate improved functional independence. MET 9/23/2020 - goal modified    Plan      Plan of care Certification: 6/9/2020 to 9/9/2020. EXTEND POC to 2/6/2021     Outpatient Physical Therapy 2 times weekly for 8 weeks to include the following interventions: Gait Training, Manual Therapy, Moist Heat/ Ice, Neuromuscular Re-ed, Patient Education, Therapeutic Activites and Therapeutic Exercise.     Cont skilled PT session towards PT and patient's goals. Assess for plateau in 1 month.     Ligia Ibarra, PT    12/30/2020

## 2021-01-04 ENCOUNTER — PATIENT MESSAGE (OUTPATIENT)
Dept: ADMINISTRATIVE | Facility: HOSPITAL | Age: 71
End: 2021-01-04

## 2021-01-07 ENCOUNTER — CLINICAL SUPPORT (OUTPATIENT)
Dept: REHABILITATION | Facility: HOSPITAL | Age: 71
End: 2021-01-07
Attending: FAMILY MEDICINE
Payer: MEDICARE

## 2021-01-07 DIAGNOSIS — M25.612 DECREASED ROM OF LEFT SHOULDER: Primary | ICD-10-CM

## 2021-01-07 DIAGNOSIS — R29.898 DECREASED GRIP STRENGTH: ICD-10-CM

## 2021-01-07 DIAGNOSIS — M25.611 DECREASED ROM OF RIGHT SHOULDER: ICD-10-CM

## 2021-01-07 DIAGNOSIS — Z78.9 ALTERATION IN INSTRUMENTAL ACTIVITIES OF DAILY LIVING (IADL): ICD-10-CM

## 2021-01-07 PROCEDURE — 97110 THERAPEUTIC EXERCISES: CPT | Mod: PN

## 2021-01-13 ENCOUNTER — CLINICAL SUPPORT (OUTPATIENT)
Dept: REHABILITATION | Facility: HOSPITAL | Age: 71
End: 2021-01-13
Attending: FAMILY MEDICINE
Payer: MEDICARE

## 2021-01-13 DIAGNOSIS — M25.612 DECREASED ROM OF LEFT SHOULDER: Primary | ICD-10-CM

## 2021-01-13 DIAGNOSIS — M25.611 DECREASED ROM OF RIGHT SHOULDER: ICD-10-CM

## 2021-01-13 DIAGNOSIS — Z78.9 ALTERATION IN INSTRUMENTAL ACTIVITIES OF DAILY LIVING (IADL): ICD-10-CM

## 2021-01-13 DIAGNOSIS — R26.9 GAIT ABNORMALITY: ICD-10-CM

## 2021-01-13 DIAGNOSIS — R68.89 DECREASED STRENGTH, ENDURANCE, AND MOBILITY: ICD-10-CM

## 2021-01-13 DIAGNOSIS — R53.1 DECREASED STRENGTH, ENDURANCE, AND MOBILITY: ICD-10-CM

## 2021-01-13 DIAGNOSIS — Z74.09 DECREASED STRENGTH, ENDURANCE, AND MOBILITY: ICD-10-CM

## 2021-01-13 DIAGNOSIS — R26.89 BALANCE PROBLEM: ICD-10-CM

## 2021-01-13 DIAGNOSIS — R29.898 DECREASED GRIP STRENGTH: ICD-10-CM

## 2021-01-13 PROCEDURE — 97110 THERAPEUTIC EXERCISES: CPT | Mod: PN

## 2021-01-20 ENCOUNTER — CLINICAL SUPPORT (OUTPATIENT)
Dept: REHABILITATION | Facility: HOSPITAL | Age: 71
End: 2021-01-20
Attending: FAMILY MEDICINE
Payer: MEDICARE

## 2021-01-20 DIAGNOSIS — Z78.9 ALTERATION IN INSTRUMENTAL ACTIVITIES OF DAILY LIVING (IADL): ICD-10-CM

## 2021-01-20 DIAGNOSIS — R29.898 DECREASED GRIP STRENGTH: ICD-10-CM

## 2021-01-20 DIAGNOSIS — R26.89 BALANCE PROBLEM: ICD-10-CM

## 2021-01-20 DIAGNOSIS — Z74.09 DECREASED STRENGTH, ENDURANCE, AND MOBILITY: ICD-10-CM

## 2021-01-20 DIAGNOSIS — R68.89 DECREASED STRENGTH, ENDURANCE, AND MOBILITY: ICD-10-CM

## 2021-01-20 DIAGNOSIS — M25.611 DECREASED ROM OF RIGHT SHOULDER: ICD-10-CM

## 2021-01-20 DIAGNOSIS — R26.9 GAIT ABNORMALITY: ICD-10-CM

## 2021-01-20 DIAGNOSIS — R53.1 DECREASED STRENGTH, ENDURANCE, AND MOBILITY: ICD-10-CM

## 2021-01-20 DIAGNOSIS — M25.612 DECREASED ROM OF LEFT SHOULDER: Primary | ICD-10-CM

## 2021-01-20 PROCEDURE — 97110 THERAPEUTIC EXERCISES: CPT | Mod: PN

## 2021-01-20 PROCEDURE — 97022 WHIRLPOOL THERAPY: CPT | Mod: PN

## 2021-01-20 PROCEDURE — 97116 GAIT TRAINING THERAPY: CPT | Mod: PN,CQ

## 2021-01-20 PROCEDURE — 97110 THERAPEUTIC EXERCISES: CPT | Mod: PN,CQ

## 2021-01-26 ENCOUNTER — OFFICE VISIT (OUTPATIENT)
Dept: FAMILY MEDICINE | Facility: CLINIC | Age: 71
End: 2021-01-26
Payer: MEDICARE

## 2021-01-26 ENCOUNTER — LAB VISIT (OUTPATIENT)
Dept: LAB | Facility: HOSPITAL | Age: 71
End: 2021-01-26
Attending: FAMILY MEDICINE
Payer: MEDICARE

## 2021-01-26 VITALS
HEART RATE: 86 BPM | BODY MASS INDEX: 45.91 KG/M2 | RESPIRATION RATE: 18 BRPM | TEMPERATURE: 98 F | OXYGEN SATURATION: 97 % | HEIGHT: 69 IN | WEIGHT: 309.94 LBS | DIASTOLIC BLOOD PRESSURE: 84 MMHG | SYSTOLIC BLOOD PRESSURE: 137 MMHG

## 2021-01-26 DIAGNOSIS — I10 ESSENTIAL HYPERTENSION: ICD-10-CM

## 2021-01-26 DIAGNOSIS — E11.9 TYPE 2 DIABETES MELLITUS WITHOUT COMPLICATION: ICD-10-CM

## 2021-01-26 DIAGNOSIS — R73.03 PRE-DIABETES: ICD-10-CM

## 2021-01-26 DIAGNOSIS — G47.33 OSA (OBSTRUCTIVE SLEEP APNEA): Primary | ICD-10-CM

## 2021-01-26 DIAGNOSIS — M51.36 DDD (DEGENERATIVE DISC DISEASE), LUMBAR: ICD-10-CM

## 2021-01-26 DIAGNOSIS — J44.9 COPD, MODERATE: ICD-10-CM

## 2021-01-26 PROBLEM — L89.300 PRESSURE INJURY OF BUTTOCK, UNSTAGEABLE: Status: RESOLVED | Noted: 2020-03-06 | Resolved: 2021-01-26

## 2021-01-26 LAB
ALBUMIN SERPL BCP-MCNC: 3.7 G/DL (ref 3.5–5.2)
ALP SERPL-CCNC: 100 U/L (ref 55–135)
ALT SERPL W/O P-5'-P-CCNC: 34 U/L (ref 10–44)
ANION GAP SERPL CALC-SCNC: 11 MMOL/L (ref 8–16)
AST SERPL-CCNC: 44 U/L (ref 10–40)
BILIRUB SERPL-MCNC: 0.4 MG/DL (ref 0.1–1)
BUN SERPL-MCNC: 14 MG/DL (ref 8–23)
CALCIUM SERPL-MCNC: 9.3 MG/DL (ref 8.7–10.5)
CHLORIDE SERPL-SCNC: 100 MMOL/L (ref 95–110)
CHOLEST SERPL-MCNC: 112 MG/DL (ref 120–199)
CHOLEST/HDLC SERPL: 2.1 {RATIO} (ref 2–5)
CO2 SERPL-SCNC: 27 MMOL/L (ref 23–29)
CREAT SERPL-MCNC: 1.4 MG/DL (ref 0.5–1.4)
EST. GFR  (AFRICAN AMERICAN): 58.8 ML/MIN/1.73 M^2
EST. GFR  (NON AFRICAN AMERICAN): 50.9 ML/MIN/1.73 M^2
GLUCOSE SERPL-MCNC: 88 MG/DL (ref 70–110)
HDLC SERPL-MCNC: 54 MG/DL (ref 40–75)
HDLC SERPL: 48.2 % (ref 20–50)
LDLC SERPL CALC-MCNC: 39.4 MG/DL (ref 63–159)
NONHDLC SERPL-MCNC: 58 MG/DL
POTASSIUM SERPL-SCNC: 3.6 MMOL/L (ref 3.5–5.1)
PROT SERPL-MCNC: 7.9 G/DL (ref 6–8.4)
SODIUM SERPL-SCNC: 138 MMOL/L (ref 136–145)
TRIGL SERPL-MCNC: 93 MG/DL (ref 30–150)

## 2021-01-26 PROCEDURE — 1101F PR PT FALLS ASSESS DOC 0-1 FALLS W/OUT INJ PAST YR: ICD-10-PCS | Mod: CPTII,S$GLB,, | Performed by: PHYSICIAN ASSISTANT

## 2021-01-26 PROCEDURE — 3008F BODY MASS INDEX DOCD: CPT | Mod: CPTII,S$GLB,, | Performed by: PHYSICIAN ASSISTANT

## 2021-01-26 PROCEDURE — 3075F SYST BP GE 130 - 139MM HG: CPT | Mod: CPTII,S$GLB,, | Performed by: PHYSICIAN ASSISTANT

## 2021-01-26 PROCEDURE — 3079F DIAST BP 80-89 MM HG: CPT | Mod: CPTII,S$GLB,, | Performed by: PHYSICIAN ASSISTANT

## 2021-01-26 PROCEDURE — 3288F PR FALLS RISK ASSESSMENT DOCUMENTED: ICD-10-PCS | Mod: CPTII,S$GLB,, | Performed by: PHYSICIAN ASSISTANT

## 2021-01-26 PROCEDURE — 1125F PR PAIN SEVERITY QUANTIFIED, PAIN PRESENT: ICD-10-PCS | Mod: S$GLB,,, | Performed by: PHYSICIAN ASSISTANT

## 2021-01-26 PROCEDURE — 99499 RISK ADDL DX/OHS AUDIT: ICD-10-PCS | Mod: S$GLB,,, | Performed by: PHYSICIAN ASSISTANT

## 2021-01-26 PROCEDURE — 99214 OFFICE O/P EST MOD 30 MIN: CPT | Mod: S$GLB,,, | Performed by: PHYSICIAN ASSISTANT

## 2021-01-26 PROCEDURE — 83036 HEMOGLOBIN GLYCOSYLATED A1C: CPT

## 2021-01-26 PROCEDURE — 80061 LIPID PANEL: CPT

## 2021-01-26 PROCEDURE — 1125F AMNT PAIN NOTED PAIN PRSNT: CPT | Mod: S$GLB,,, | Performed by: PHYSICIAN ASSISTANT

## 2021-01-26 PROCEDURE — 3288F FALL RISK ASSESSMENT DOCD: CPT | Mod: CPTII,S$GLB,, | Performed by: PHYSICIAN ASSISTANT

## 2021-01-26 PROCEDURE — 1101F PT FALLS ASSESS-DOCD LE1/YR: CPT | Mod: CPTII,S$GLB,, | Performed by: PHYSICIAN ASSISTANT

## 2021-01-26 PROCEDURE — 99999 PR PBB SHADOW E&M-EST. PATIENT-LVL V: ICD-10-PCS | Mod: PBBFAC,,, | Performed by: PHYSICIAN ASSISTANT

## 2021-01-26 PROCEDURE — 1159F MED LIST DOCD IN RCRD: CPT | Mod: S$GLB,,, | Performed by: PHYSICIAN ASSISTANT

## 2021-01-26 PROCEDURE — 99999 PR PBB SHADOW E&M-EST. PATIENT-LVL V: CPT | Mod: PBBFAC,,, | Performed by: PHYSICIAN ASSISTANT

## 2021-01-26 PROCEDURE — 99499 UNLISTED E&M SERVICE: CPT | Mod: S$GLB,,, | Performed by: PHYSICIAN ASSISTANT

## 2021-01-26 PROCEDURE — 3008F PR BODY MASS INDEX (BMI) DOCUMENTED: ICD-10-PCS | Mod: CPTII,S$GLB,, | Performed by: PHYSICIAN ASSISTANT

## 2021-01-26 PROCEDURE — 99214 PR OFFICE/OUTPT VISIT, EST, LEVL IV, 30-39 MIN: ICD-10-PCS | Mod: S$GLB,,, | Performed by: PHYSICIAN ASSISTANT

## 2021-01-26 PROCEDURE — 80053 COMPREHEN METABOLIC PANEL: CPT

## 2021-01-26 PROCEDURE — 1159F PR MEDICATION LIST DOCUMENTED IN MEDICAL RECORD: ICD-10-PCS | Mod: S$GLB,,, | Performed by: PHYSICIAN ASSISTANT

## 2021-01-26 PROCEDURE — 3079F PR MOST RECENT DIASTOLIC BLOOD PRESSURE 80-89 MM HG: ICD-10-PCS | Mod: CPTII,S$GLB,, | Performed by: PHYSICIAN ASSISTANT

## 2021-01-26 PROCEDURE — 36415 COLL VENOUS BLD VENIPUNCTURE: CPT | Mod: PO

## 2021-01-26 PROCEDURE — 3075F PR MOST RECENT SYSTOLIC BLOOD PRESS GE 130-139MM HG: ICD-10-PCS | Mod: CPTII,S$GLB,, | Performed by: PHYSICIAN ASSISTANT

## 2021-01-26 RX ORDER — ACETAMINOPHEN AND CODEINE PHOSPHATE 300; 30 MG/1; MG/1
TABLET ORAL
COMMUNITY
Start: 2020-10-24 | End: 2023-01-20 | Stop reason: ALTCHOICE

## 2021-01-26 RX ORDER — CYCLOBENZAPRINE HCL 5 MG
5 TABLET ORAL 3 TIMES DAILY PRN
Qty: 60 TABLET | Refills: 0 | Status: SHIPPED | OUTPATIENT
Start: 2021-01-26 | End: 2021-03-17

## 2021-01-27 ENCOUNTER — CLINICAL SUPPORT (OUTPATIENT)
Dept: REHABILITATION | Facility: HOSPITAL | Age: 71
End: 2021-01-27
Attending: FAMILY MEDICINE
Payer: MEDICARE

## 2021-01-27 DIAGNOSIS — R29.898 DECREASED GRIP STRENGTH: ICD-10-CM

## 2021-01-27 DIAGNOSIS — R26.89 BALANCE PROBLEM: ICD-10-CM

## 2021-01-27 DIAGNOSIS — Z74.09 DECREASED STRENGTH, ENDURANCE, AND MOBILITY: ICD-10-CM

## 2021-01-27 DIAGNOSIS — R53.1 DECREASED STRENGTH, ENDURANCE, AND MOBILITY: ICD-10-CM

## 2021-01-27 DIAGNOSIS — M25.612 DECREASED ROM OF LEFT SHOULDER: Primary | ICD-10-CM

## 2021-01-27 DIAGNOSIS — Z78.9 ALTERATION IN INSTRUMENTAL ACTIVITIES OF DAILY LIVING (IADL): ICD-10-CM

## 2021-01-27 DIAGNOSIS — R68.89 DECREASED STRENGTH, ENDURANCE, AND MOBILITY: ICD-10-CM

## 2021-01-27 DIAGNOSIS — R26.9 GAIT ABNORMALITY: ICD-10-CM

## 2021-01-27 DIAGNOSIS — M25.611 DECREASED ROM OF RIGHT SHOULDER: ICD-10-CM

## 2021-01-27 LAB
ESTIMATED AVG GLUCOSE: 120 MG/DL (ref 68–131)
HBA1C MFR BLD HPLC: 5.8 % (ref 4–5.6)

## 2021-01-27 PROCEDURE — 97116 GAIT TRAINING THERAPY: CPT | Mod: PN,CQ

## 2021-01-27 PROCEDURE — 97110 THERAPEUTIC EXERCISES: CPT | Mod: PN

## 2021-01-27 PROCEDURE — 97110 THERAPEUTIC EXERCISES: CPT | Mod: PN,CQ

## 2021-01-27 PROCEDURE — 97022 WHIRLPOOL THERAPY: CPT | Mod: PN

## 2021-01-29 ENCOUNTER — CLINICAL SUPPORT (OUTPATIENT)
Dept: REHABILITATION | Facility: HOSPITAL | Age: 71
End: 2021-01-29
Attending: FAMILY MEDICINE
Payer: MEDICARE

## 2021-01-29 DIAGNOSIS — Z74.09 DECREASED STRENGTH, ENDURANCE, AND MOBILITY: ICD-10-CM

## 2021-01-29 DIAGNOSIS — R26.89 BALANCE PROBLEM: ICD-10-CM

## 2021-01-29 DIAGNOSIS — R68.89 DECREASED STRENGTH, ENDURANCE, AND MOBILITY: ICD-10-CM

## 2021-01-29 DIAGNOSIS — M25.612 DECREASED ROM OF LEFT SHOULDER: Primary | ICD-10-CM

## 2021-01-29 DIAGNOSIS — R53.1 DECREASED STRENGTH, ENDURANCE, AND MOBILITY: ICD-10-CM

## 2021-01-29 DIAGNOSIS — R26.9 GAIT ABNORMALITY: ICD-10-CM

## 2021-01-29 DIAGNOSIS — M25.611 DECREASED ROM OF RIGHT SHOULDER: ICD-10-CM

## 2021-01-29 DIAGNOSIS — R29.898 DECREASED GRIP STRENGTH: ICD-10-CM

## 2021-01-29 DIAGNOSIS — Z78.9 ALTERATION IN INSTRUMENTAL ACTIVITIES OF DAILY LIVING (IADL): ICD-10-CM

## 2021-01-29 PROCEDURE — 97116 GAIT TRAINING THERAPY: CPT | Mod: PN

## 2021-01-29 PROCEDURE — 97110 THERAPEUTIC EXERCISES: CPT | Mod: PN

## 2021-01-29 PROCEDURE — 97022 WHIRLPOOL THERAPY: CPT | Mod: PN

## 2021-02-05 ENCOUNTER — CLINICAL SUPPORT (OUTPATIENT)
Dept: REHABILITATION | Facility: HOSPITAL | Age: 71
End: 2021-02-05
Attending: FAMILY MEDICINE
Payer: MEDICARE

## 2021-02-05 DIAGNOSIS — Z74.09 DECREASED STRENGTH, ENDURANCE, AND MOBILITY: ICD-10-CM

## 2021-02-05 DIAGNOSIS — R53.1 DECREASED STRENGTH, ENDURANCE, AND MOBILITY: ICD-10-CM

## 2021-02-05 DIAGNOSIS — M25.612 DECREASED ROM OF LEFT SHOULDER: Primary | ICD-10-CM

## 2021-02-05 DIAGNOSIS — R29.898 DECREASED GRIP STRENGTH: ICD-10-CM

## 2021-02-05 DIAGNOSIS — R26.89 BALANCE PROBLEM: ICD-10-CM

## 2021-02-05 DIAGNOSIS — Z78.9 ALTERATION IN INSTRUMENTAL ACTIVITIES OF DAILY LIVING (IADL): ICD-10-CM

## 2021-02-05 DIAGNOSIS — M25.611 DECREASED ROM OF RIGHT SHOULDER: ICD-10-CM

## 2021-02-05 DIAGNOSIS — R68.89 DECREASED STRENGTH, ENDURANCE, AND MOBILITY: ICD-10-CM

## 2021-02-05 DIAGNOSIS — R26.9 GAIT ABNORMALITY: ICD-10-CM

## 2021-02-05 PROCEDURE — 97110 THERAPEUTIC EXERCISES: CPT | Mod: PN

## 2021-02-05 PROCEDURE — 97116 GAIT TRAINING THERAPY: CPT | Mod: PN

## 2021-02-05 PROCEDURE — 97022 WHIRLPOOL THERAPY: CPT | Mod: PN

## 2021-02-09 ENCOUNTER — CLINICAL SUPPORT (OUTPATIENT)
Dept: REHABILITATION | Facility: HOSPITAL | Age: 71
End: 2021-02-09
Attending: FAMILY MEDICINE
Payer: MEDICARE

## 2021-02-09 DIAGNOSIS — Z74.09 DECREASED STRENGTH, ENDURANCE, AND MOBILITY: ICD-10-CM

## 2021-02-09 DIAGNOSIS — R68.89 DECREASED STRENGTH, ENDURANCE, AND MOBILITY: ICD-10-CM

## 2021-02-09 DIAGNOSIS — R26.89 BALANCE PROBLEM: ICD-10-CM

## 2021-02-09 DIAGNOSIS — R26.9 GAIT ABNORMALITY: ICD-10-CM

## 2021-02-09 DIAGNOSIS — R53.1 DECREASED STRENGTH, ENDURANCE, AND MOBILITY: ICD-10-CM

## 2021-02-09 PROCEDURE — 97110 THERAPEUTIC EXERCISES: CPT | Mod: PN

## 2021-02-11 ENCOUNTER — CLINICAL SUPPORT (OUTPATIENT)
Dept: REHABILITATION | Facility: HOSPITAL | Age: 71
End: 2021-02-11
Attending: FAMILY MEDICINE
Payer: MEDICARE

## 2021-02-11 DIAGNOSIS — M25.611 DECREASED ROM OF RIGHT SHOULDER: ICD-10-CM

## 2021-02-11 DIAGNOSIS — R26.89 BALANCE PROBLEM: ICD-10-CM

## 2021-02-11 DIAGNOSIS — Z78.9 ALTERATION IN INSTRUMENTAL ACTIVITIES OF DAILY LIVING (IADL): ICD-10-CM

## 2021-02-11 DIAGNOSIS — R26.9 GAIT ABNORMALITY: ICD-10-CM

## 2021-02-11 DIAGNOSIS — Z74.09 DECREASED STRENGTH, ENDURANCE, AND MOBILITY: ICD-10-CM

## 2021-02-11 DIAGNOSIS — R29.898 DECREASED GRIP STRENGTH: ICD-10-CM

## 2021-02-11 DIAGNOSIS — M25.612 DECREASED ROM OF LEFT SHOULDER: Primary | ICD-10-CM

## 2021-02-11 DIAGNOSIS — R68.89 DECREASED STRENGTH, ENDURANCE, AND MOBILITY: ICD-10-CM

## 2021-02-11 DIAGNOSIS — R53.1 DECREASED STRENGTH, ENDURANCE, AND MOBILITY: ICD-10-CM

## 2021-02-11 PROCEDURE — 97110 THERAPEUTIC EXERCISES: CPT | Mod: PN

## 2021-02-11 PROCEDURE — 97110 THERAPEUTIC EXERCISES: CPT | Mod: PN,CQ

## 2021-02-18 ENCOUNTER — CLINICAL SUPPORT (OUTPATIENT)
Dept: REHABILITATION | Facility: HOSPITAL | Age: 71
End: 2021-02-18
Attending: FAMILY MEDICINE
Payer: MEDICARE

## 2021-02-18 DIAGNOSIS — Z78.9 ALTERATION IN INSTRUMENTAL ACTIVITIES OF DAILY LIVING (IADL): ICD-10-CM

## 2021-02-18 DIAGNOSIS — R29.898 DECREASED GRIP STRENGTH: ICD-10-CM

## 2021-02-18 DIAGNOSIS — R26.89 BALANCE PROBLEM: ICD-10-CM

## 2021-02-18 DIAGNOSIS — R26.9 GAIT ABNORMALITY: ICD-10-CM

## 2021-02-18 DIAGNOSIS — M25.611 DECREASED ROM OF RIGHT SHOULDER: ICD-10-CM

## 2021-02-18 DIAGNOSIS — R68.89 DECREASED STRENGTH, ENDURANCE, AND MOBILITY: ICD-10-CM

## 2021-02-18 DIAGNOSIS — R53.1 DECREASED STRENGTH, ENDURANCE, AND MOBILITY: ICD-10-CM

## 2021-02-18 DIAGNOSIS — M25.612 DECREASED ROM OF LEFT SHOULDER: Primary | ICD-10-CM

## 2021-02-18 DIAGNOSIS — Z74.09 DECREASED STRENGTH, ENDURANCE, AND MOBILITY: ICD-10-CM

## 2021-02-18 PROCEDURE — 97110 THERAPEUTIC EXERCISES: CPT | Mod: PN

## 2021-02-22 ENCOUNTER — CLINICAL SUPPORT (OUTPATIENT)
Dept: REHABILITATION | Facility: HOSPITAL | Age: 71
End: 2021-02-22
Attending: FAMILY MEDICINE
Payer: MEDICARE

## 2021-02-22 DIAGNOSIS — R26.9 GAIT ABNORMALITY: ICD-10-CM

## 2021-02-22 DIAGNOSIS — R68.89 DECREASED STRENGTH, ENDURANCE, AND MOBILITY: ICD-10-CM

## 2021-02-22 DIAGNOSIS — Z78.9 ALTERATION IN INSTRUMENTAL ACTIVITIES OF DAILY LIVING (IADL): ICD-10-CM

## 2021-02-22 DIAGNOSIS — R53.1 DECREASED STRENGTH, ENDURANCE, AND MOBILITY: ICD-10-CM

## 2021-02-22 DIAGNOSIS — R26.89 BALANCE PROBLEM: ICD-10-CM

## 2021-02-22 DIAGNOSIS — M25.612 DECREASED ROM OF LEFT SHOULDER: Primary | ICD-10-CM

## 2021-02-22 DIAGNOSIS — M25.611 DECREASED ROM OF RIGHT SHOULDER: ICD-10-CM

## 2021-02-22 DIAGNOSIS — R29.898 DECREASED GRIP STRENGTH: ICD-10-CM

## 2021-02-22 DIAGNOSIS — Z74.09 DECREASED STRENGTH, ENDURANCE, AND MOBILITY: ICD-10-CM

## 2021-02-22 PROCEDURE — 97110 THERAPEUTIC EXERCISES: CPT | Mod: PN

## 2021-02-23 ENCOUNTER — PATIENT MESSAGE (OUTPATIENT)
Dept: RHEUMATOLOGY | Facility: CLINIC | Age: 71
End: 2021-02-23

## 2021-03-03 ENCOUNTER — CLINICAL SUPPORT (OUTPATIENT)
Dept: REHABILITATION | Facility: HOSPITAL | Age: 71
End: 2021-03-03
Attending: FAMILY MEDICINE
Payer: MEDICARE

## 2021-03-03 DIAGNOSIS — Z74.09 DECREASED STRENGTH, ENDURANCE, AND MOBILITY: ICD-10-CM

## 2021-03-03 DIAGNOSIS — R26.89 BALANCE PROBLEM: ICD-10-CM

## 2021-03-03 DIAGNOSIS — R53.1 DECREASED STRENGTH, ENDURANCE, AND MOBILITY: ICD-10-CM

## 2021-03-03 DIAGNOSIS — R26.9 GAIT ABNORMALITY: ICD-10-CM

## 2021-03-03 DIAGNOSIS — M25.612 DECREASED ROM OF LEFT SHOULDER: Primary | ICD-10-CM

## 2021-03-03 DIAGNOSIS — R29.898 DECREASED GRIP STRENGTH: ICD-10-CM

## 2021-03-03 DIAGNOSIS — R68.89 DECREASED STRENGTH, ENDURANCE, AND MOBILITY: ICD-10-CM

## 2021-03-03 DIAGNOSIS — M25.611 DECREASED ROM OF RIGHT SHOULDER: ICD-10-CM

## 2021-03-03 DIAGNOSIS — Z78.9 ALTERATION IN INSTRUMENTAL ACTIVITIES OF DAILY LIVING (IADL): ICD-10-CM

## 2021-03-03 PROCEDURE — 97110 THERAPEUTIC EXERCISES: CPT | Mod: PN

## 2021-03-17 ENCOUNTER — CLINICAL SUPPORT (OUTPATIENT)
Dept: REHABILITATION | Facility: HOSPITAL | Age: 71
End: 2021-03-17
Attending: FAMILY MEDICINE
Payer: MEDICARE

## 2021-03-17 DIAGNOSIS — M25.612 DECREASED ROM OF LEFT SHOULDER: Primary | ICD-10-CM

## 2021-03-17 DIAGNOSIS — R26.89 BALANCE PROBLEM: ICD-10-CM

## 2021-03-17 DIAGNOSIS — R68.89 DECREASED STRENGTH, ENDURANCE, AND MOBILITY: ICD-10-CM

## 2021-03-17 DIAGNOSIS — Z74.09 DECREASED STRENGTH, ENDURANCE, AND MOBILITY: ICD-10-CM

## 2021-03-17 DIAGNOSIS — R26.9 GAIT ABNORMALITY: ICD-10-CM

## 2021-03-17 DIAGNOSIS — R29.898 DECREASED GRIP STRENGTH: ICD-10-CM

## 2021-03-17 DIAGNOSIS — M25.611 DECREASED ROM OF RIGHT SHOULDER: ICD-10-CM

## 2021-03-17 DIAGNOSIS — Z78.9 ALTERATION IN INSTRUMENTAL ACTIVITIES OF DAILY LIVING (IADL): ICD-10-CM

## 2021-03-17 DIAGNOSIS — R53.1 DECREASED STRENGTH, ENDURANCE, AND MOBILITY: ICD-10-CM

## 2021-03-17 PROCEDURE — 97110 THERAPEUTIC EXERCISES: CPT | Mod: PN

## 2021-04-05 ENCOUNTER — PATIENT MESSAGE (OUTPATIENT)
Dept: ADMINISTRATIVE | Facility: HOSPITAL | Age: 71
End: 2021-04-05

## 2021-04-28 DIAGNOSIS — I10 ESSENTIAL HYPERTENSION: ICD-10-CM

## 2021-04-28 DIAGNOSIS — M51.36 DDD (DEGENERATIVE DISC DISEASE), LUMBAR: ICD-10-CM

## 2021-05-02 RX ORDER — DILTIAZEM HYDROCHLORIDE 240 MG/1
240 CAPSULE, COATED, EXTENDED RELEASE ORAL DAILY
Qty: 90 CAPSULE | Refills: 1 | Status: SHIPPED | OUTPATIENT
Start: 2021-05-02 | End: 2021-07-22

## 2021-05-02 RX ORDER — CYCLOBENZAPRINE HCL 5 MG
TABLET ORAL
Qty: 60 TABLET | Refills: 0 | Status: SHIPPED | OUTPATIENT
Start: 2021-05-02 | End: 2021-06-14 | Stop reason: SDUPTHER

## 2021-05-05 DIAGNOSIS — E11.9 TYPE 2 DIABETES MELLITUS WITHOUT COMPLICATION: ICD-10-CM

## 2021-05-06 DIAGNOSIS — N40.0 BENIGN PROSTATIC HYPERPLASIA WITHOUT LOWER URINARY TRACT SYMPTOMS: Primary | ICD-10-CM

## 2021-05-07 RX ORDER — TAMSULOSIN HYDROCHLORIDE 0.4 MG/1
CAPSULE ORAL
Qty: 90 CAPSULE | Refills: 3 | Status: SHIPPED | OUTPATIENT
Start: 2021-05-07 | End: 2022-02-21

## 2021-05-19 DIAGNOSIS — M1A.09X1 IDIOPATHIC CHRONIC GOUT OF MULTIPLE SITES WITH TOPHUS: ICD-10-CM

## 2021-05-19 RX ORDER — COLCHICINE 0.6 MG/1
0.6 TABLET, FILM COATED ORAL DAILY
Qty: 30 TABLET | Refills: 11 | Status: CANCELLED
Start: 2021-05-19

## 2021-05-19 RX ORDER — COLCHICINE 0.6 MG/1
0.6 CAPSULE ORAL DAILY
Qty: 90 CAPSULE | Refills: 3 | Status: SHIPPED | OUTPATIENT
Start: 2021-05-19 | End: 2022-04-28

## 2021-05-28 DIAGNOSIS — I10 ESSENTIAL HYPERTENSION: ICD-10-CM

## 2021-05-31 RX ORDER — HYDROCHLOROTHIAZIDE 25 MG/1
25 TABLET ORAL DAILY
Qty: 90 TABLET | Refills: 1 | Status: SHIPPED | OUTPATIENT
Start: 2021-05-31 | End: 2021-09-02

## 2021-06-02 ENCOUNTER — PATIENT OUTREACH (OUTPATIENT)
Dept: ADMINISTRATIVE | Facility: OTHER | Age: 71
End: 2021-06-02

## 2021-06-02 DIAGNOSIS — Z12.11 COLON CANCER SCREENING: Primary | ICD-10-CM

## 2021-06-04 ENCOUNTER — OFFICE VISIT (OUTPATIENT)
Dept: PULMONOLOGY | Facility: CLINIC | Age: 71
End: 2021-06-04
Payer: MEDICARE

## 2021-06-04 ENCOUNTER — HOSPITAL ENCOUNTER (OUTPATIENT)
Dept: RADIOLOGY | Facility: HOSPITAL | Age: 71
Discharge: HOME | End: 2021-06-04
Attending: NURSE PRACTITIONER
Payer: MEDICARE

## 2021-06-04 VITALS
OXYGEN SATURATION: 96 % | TEMPERATURE: 96 F | HEART RATE: 108 BPM | DIASTOLIC BLOOD PRESSURE: 92 MMHG | WEIGHT: 314.81 LBS | SYSTOLIC BLOOD PRESSURE: 179 MMHG | BODY MASS INDEX: 46.63 KG/M2 | HEIGHT: 69 IN

## 2021-06-04 DIAGNOSIS — R26.9 GAIT ABNORMALITY: ICD-10-CM

## 2021-06-04 DIAGNOSIS — D64.9 ANEMIA, UNSPECIFIED TYPE: ICD-10-CM

## 2021-06-04 DIAGNOSIS — G47.33 OBSTRUCTIVE SLEEP APNEA ON CPAP: ICD-10-CM

## 2021-06-04 DIAGNOSIS — R06.02 SOBOE (SHORTNESS OF BREATH ON EXERTION): Primary | ICD-10-CM

## 2021-06-04 DIAGNOSIS — R06.02 SOBOE (SHORTNESS OF BREATH ON EXERTION): ICD-10-CM

## 2021-06-04 PROCEDURE — 3008F BODY MASS INDEX DOCD: CPT | Mod: CPTII,S$GLB,, | Performed by: NURSE PRACTITIONER

## 2021-06-04 PROCEDURE — 1101F PT FALLS ASSESS-DOCD LE1/YR: CPT | Mod: CPTII,S$GLB,, | Performed by: NURSE PRACTITIONER

## 2021-06-04 PROCEDURE — 99999 PR PBB SHADOW E&M-EST. PATIENT-LVL V: CPT | Mod: PBBFAC,,, | Performed by: NURSE PRACTITIONER

## 2021-06-04 PROCEDURE — 1101F PR PT FALLS ASSESS DOC 0-1 FALLS W/OUT INJ PAST YR: ICD-10-PCS | Mod: CPTII,S$GLB,, | Performed by: NURSE PRACTITIONER

## 2021-06-04 PROCEDURE — 1125F PR PAIN SEVERITY QUANTIFIED, PAIN PRESENT: ICD-10-PCS | Mod: S$GLB,,, | Performed by: NURSE PRACTITIONER

## 2021-06-04 PROCEDURE — 3288F FALL RISK ASSESSMENT DOCD: CPT | Mod: CPTII,S$GLB,, | Performed by: NURSE PRACTITIONER

## 2021-06-04 PROCEDURE — 99999 PR PBB SHADOW E&M-EST. PATIENT-LVL V: ICD-10-PCS | Mod: PBBFAC,,, | Performed by: NURSE PRACTITIONER

## 2021-06-04 PROCEDURE — 3288F PR FALLS RISK ASSESSMENT DOCUMENTED: ICD-10-PCS | Mod: CPTII,S$GLB,, | Performed by: NURSE PRACTITIONER

## 2021-06-04 PROCEDURE — 1159F PR MEDICATION LIST DOCUMENTED IN MEDICAL RECORD: ICD-10-PCS | Mod: S$GLB,,, | Performed by: NURSE PRACTITIONER

## 2021-06-04 PROCEDURE — 99499 RISK ADDL DX/OHS AUDIT: ICD-10-PCS | Mod: S$GLB,,, | Performed by: NURSE PRACTITIONER

## 2021-06-04 PROCEDURE — 99203 OFFICE O/P NEW LOW 30 MIN: CPT | Mod: S$GLB,,, | Performed by: NURSE PRACTITIONER

## 2021-06-04 PROCEDURE — 3008F PR BODY MASS INDEX (BMI) DOCUMENTED: ICD-10-PCS | Mod: CPTII,S$GLB,, | Performed by: NURSE PRACTITIONER

## 2021-06-04 PROCEDURE — 1159F MED LIST DOCD IN RCRD: CPT | Mod: S$GLB,,, | Performed by: NURSE PRACTITIONER

## 2021-06-04 PROCEDURE — 71046 X-RAY EXAM CHEST 2 VIEWS: CPT | Mod: 26,,, | Performed by: RADIOLOGY

## 2021-06-04 PROCEDURE — 71046 XR CHEST PA AND LATERAL: ICD-10-PCS | Mod: 26,,, | Performed by: RADIOLOGY

## 2021-06-04 PROCEDURE — 99203 PR OFFICE/OUTPT VISIT, NEW, LEVL III, 30-44 MIN: ICD-10-PCS | Mod: S$GLB,,, | Performed by: NURSE PRACTITIONER

## 2021-06-04 PROCEDURE — 1125F AMNT PAIN NOTED PAIN PRSNT: CPT | Mod: S$GLB,,, | Performed by: NURSE PRACTITIONER

## 2021-06-04 PROCEDURE — 99499 UNLISTED E&M SERVICE: CPT | Mod: S$GLB,,, | Performed by: NURSE PRACTITIONER

## 2021-06-04 PROCEDURE — 71046 X-RAY EXAM CHEST 2 VIEWS: CPT | Mod: TC,FY

## 2021-06-08 ENCOUNTER — TELEPHONE (OUTPATIENT)
Dept: FAMILY MEDICINE | Facility: CLINIC | Age: 71
End: 2021-06-08

## 2021-06-10 ENCOUNTER — PATIENT MESSAGE (OUTPATIENT)
Dept: PULMONOLOGY | Facility: CLINIC | Age: 71
End: 2021-06-10

## 2021-06-10 DIAGNOSIS — I27.20 PULMONARY HYPERTENSION: ICD-10-CM

## 2021-06-11 ENCOUNTER — OFFICE VISIT (OUTPATIENT)
Dept: CARDIOLOGY | Facility: CLINIC | Age: 71
End: 2021-06-11
Payer: MEDICARE

## 2021-06-11 VITALS
HEIGHT: 69 IN | RESPIRATION RATE: 15 BRPM | OXYGEN SATURATION: 97 % | SYSTOLIC BLOOD PRESSURE: 136 MMHG | HEART RATE: 96 BPM | BODY MASS INDEX: 46.65 KG/M2 | DIASTOLIC BLOOD PRESSURE: 88 MMHG | WEIGHT: 315 LBS

## 2021-06-11 DIAGNOSIS — D64.9 ANEMIA, UNSPECIFIED TYPE: ICD-10-CM

## 2021-06-11 DIAGNOSIS — I27.20 PULMONARY HYPERTENSION: Primary | ICD-10-CM

## 2021-06-11 DIAGNOSIS — Z78.9 PATIENT UNABLE TO EXERCISE: ICD-10-CM

## 2021-06-11 DIAGNOSIS — N18.31 STAGE 3A CHRONIC KIDNEY DISEASE: ICD-10-CM

## 2021-06-11 DIAGNOSIS — M51.36 DDD (DEGENERATIVE DISC DISEASE), LUMBAR: ICD-10-CM

## 2021-06-11 DIAGNOSIS — E66.01 MORBID OBESITY WITH BMI OF 45.0-49.9, ADULT: ICD-10-CM

## 2021-06-11 DIAGNOSIS — R26.9 GAIT ABNORMALITY: ICD-10-CM

## 2021-06-11 DIAGNOSIS — R06.02 SOBOE (SHORTNESS OF BREATH ON EXERTION): ICD-10-CM

## 2021-06-11 DIAGNOSIS — E78.2 MIXED HYPERLIPIDEMIA: ICD-10-CM

## 2021-06-11 DIAGNOSIS — G56.23 ULNAR NEUROPATHY OF BOTH UPPER EXTREMITIES: ICD-10-CM

## 2021-06-11 DIAGNOSIS — M47.22 OSTEOARTHRITIS OF SPINE WITH RADICULOPATHY, CERVICAL REGION: ICD-10-CM

## 2021-06-11 DIAGNOSIS — R06.02 SOBOE (SHORTNESS OF BREATH ON EXERTION): Primary | ICD-10-CM

## 2021-06-11 DIAGNOSIS — I10 ESSENTIAL HYPERTENSION: ICD-10-CM

## 2021-06-11 PROCEDURE — 1101F PT FALLS ASSESS-DOCD LE1/YR: CPT | Mod: CPTII,S$GLB,, | Performed by: INTERNAL MEDICINE

## 2021-06-11 PROCEDURE — 99499 UNLISTED E&M SERVICE: CPT | Mod: S$GLB,,, | Performed by: INTERNAL MEDICINE

## 2021-06-11 PROCEDURE — 3288F FALL RISK ASSESSMENT DOCD: CPT | Mod: CPTII,S$GLB,, | Performed by: INTERNAL MEDICINE

## 2021-06-11 PROCEDURE — 1159F PR MEDICATION LIST DOCUMENTED IN MEDICAL RECORD: ICD-10-PCS | Mod: S$GLB,,, | Performed by: INTERNAL MEDICINE

## 2021-06-11 PROCEDURE — 3288F PR FALLS RISK ASSESSMENT DOCUMENTED: ICD-10-PCS | Mod: CPTII,S$GLB,, | Performed by: INTERNAL MEDICINE

## 2021-06-11 PROCEDURE — 99499 RISK ADDL DX/OHS AUDIT: ICD-10-PCS | Mod: S$GLB,,, | Performed by: INTERNAL MEDICINE

## 2021-06-11 PROCEDURE — 99999 PR PBB SHADOW E&M-EST. PATIENT-LVL V: CPT | Mod: PBBFAC,,, | Performed by: INTERNAL MEDICINE

## 2021-06-11 PROCEDURE — 99204 OFFICE O/P NEW MOD 45 MIN: CPT | Mod: S$GLB,,, | Performed by: INTERNAL MEDICINE

## 2021-06-11 PROCEDURE — 1126F PR PAIN SEVERITY QUANTIFIED, NO PAIN PRESENT: ICD-10-PCS | Mod: S$GLB,,, | Performed by: INTERNAL MEDICINE

## 2021-06-11 PROCEDURE — 3008F PR BODY MASS INDEX (BMI) DOCUMENTED: ICD-10-PCS | Mod: CPTII,S$GLB,, | Performed by: INTERNAL MEDICINE

## 2021-06-11 PROCEDURE — 93000 ELECTROCARDIOGRAM COMPLETE: CPT | Mod: S$GLB,,, | Performed by: INTERNAL MEDICINE

## 2021-06-11 PROCEDURE — 99204 PR OFFICE/OUTPT VISIT, NEW, LEVL IV, 45-59 MIN: ICD-10-PCS | Mod: S$GLB,,, | Performed by: INTERNAL MEDICINE

## 2021-06-11 PROCEDURE — 1101F PR PT FALLS ASSESS DOC 0-1 FALLS W/OUT INJ PAST YR: ICD-10-PCS | Mod: CPTII,S$GLB,, | Performed by: INTERNAL MEDICINE

## 2021-06-11 PROCEDURE — 99999 PR PBB SHADOW E&M-EST. PATIENT-LVL V: ICD-10-PCS | Mod: PBBFAC,,, | Performed by: INTERNAL MEDICINE

## 2021-06-11 PROCEDURE — 1159F MED LIST DOCD IN RCRD: CPT | Mod: S$GLB,,, | Performed by: INTERNAL MEDICINE

## 2021-06-11 PROCEDURE — 3008F BODY MASS INDEX DOCD: CPT | Mod: CPTII,S$GLB,, | Performed by: INTERNAL MEDICINE

## 2021-06-11 PROCEDURE — 1126F AMNT PAIN NOTED NONE PRSNT: CPT | Mod: S$GLB,,, | Performed by: INTERNAL MEDICINE

## 2021-06-11 PROCEDURE — 93000 EKG 12-LEAD: ICD-10-PCS | Mod: S$GLB,,, | Performed by: INTERNAL MEDICINE

## 2021-06-14 RX ORDER — CYCLOBENZAPRINE HCL 5 MG
TABLET ORAL
Qty: 60 TABLET | Refills: 0 | Status: SHIPPED | OUTPATIENT
Start: 2021-06-14 | End: 2021-07-07

## 2021-06-14 RX ORDER — GABAPENTIN 600 MG/1
TABLET ORAL
Qty: 90 TABLET | Refills: 5 | Status: SHIPPED | OUTPATIENT
Start: 2021-06-14 | End: 2021-12-07 | Stop reason: SDUPTHER

## 2021-06-16 ENCOUNTER — HOSPITAL ENCOUNTER (OUTPATIENT)
Dept: RESPIRATORY THERAPY | Facility: HOSPITAL | Age: 71
Discharge: HOME | End: 2021-06-16
Attending: NURSE PRACTITIONER
Payer: MEDICARE

## 2021-06-16 VITALS — OXYGEN SATURATION: 100 % | RESPIRATION RATE: 18 BRPM | HEART RATE: 76 BPM

## 2021-06-16 DIAGNOSIS — R06.02 SOBOE (SHORTNESS OF BREATH ON EXERTION): ICD-10-CM

## 2021-06-16 LAB
BRPFT: NORMAL
DLCO ADJ PRE: 21.79 ML/(MIN*MMHG)
DLCO SINGLE BREATH LLN: 19.52
DLCO SINGLE BREATH PRE REF: 79.2 %
DLCO SINGLE BREATH REF: 26.45
DLCOC SBVA LLN: 2.64
DLCOC SBVA PRE REF: 133.6 %
DLCOC SBVA REF: 3.83
DLCOC SINGLE BREATH LLN: 19.52
DLCOC SINGLE BREATH PRE REF: 82.4 %
DLCOC SINGLE BREATH REF: 26.45
DLCOVA LLN: 2.64
DLCOVA PRE REF: 128.5 %
DLCOVA PRE: 4.92 ML/(MIN*MMHG*L)
DLCOVA REF: 3.83
DLVAADJ PRE: 5.12 ML/(MIN*MMHG*L)
ERVN2 LLN: -16448.95
ERVN2 PRE REF: 1.9 %
ERVN2 PRE: 0.02 L
ERVN2 REF: 1.05
FEF 25 75 CHG: -7.5 %
FEF 25 75 LLN: 0.76
FEF 25 75 POST REF: 57.6 %
FEF 25 75 PRE REF: 62.3 %
FEF 25 75 REF: 2.1
FET100 CHG: 9.1 %
FEV1 CHG: 11.7 %
FEV1 FVC CHG: -2.6 %
FEV1 FVC LLN: 64
FEV1 FVC POST REF: 94.9 %
FEV1 FVC PRE REF: 97.4 %
FEV1 FVC REF: 77
FEV1 LLN: 1.84
FEV1 POST REF: 68.9 %
FEV1 PRE REF: 61.7 %
FEV1 REF: 2.67
FRCN2 LLN: 2.64
FRCN2 PRE REF: 50.2 %
FRCN2 REF: 3.63
FVC CHG: 14.7 %
FVC LLN: 2.51
FVC POST REF: 72.5 %
FVC PRE REF: 63.2 %
FVC REF: 3.48
IVC PRE: 2.36 L
IVC SINGLE BREATH LLN: 2.51
IVC SINGLE BREATH PRE REF: 68 %
IVC SINGLE BREATH REF: 3.48
PEF CHG: 34.9 %
PEF LLN: 5.21
PEF POST REF: 82.3 %
PEF PRE REF: 61 %
PEF REF: 7.78
POST FEF 25 75: 1.21 L/S
POST FET 100: 10.62 SEC
POST FEV1 FVC: 72.91 %
POST FEV1: 1.84 L
POST FVC: 2.52 L
POST PEF: 6.4 L/S
PRE DLCO: 20.95 ML/(MIN*MMHG)
PRE FEF 25 75: 1.31 L/S
PRE FET 100: 9.74 SEC
PRE FEV1 FVC: 74.89 %
PRE FEV1: 1.65 L
PRE FRC N2: 1.82 L
PRE FVC: 2.2 L
PRE PEF: 4.74 L/S
RVN2 LLN: 1.91
RVN2 PRE REF: 69.8 %
RVN2 PRE: 1.8 L
RVN2 REF: 2.58
RVN2TLCN2 LLN: 31.89
RVN2TLCN2 PRE REF: 110.9 %
RVN2TLCN2 PRE: 45.34 %
RVN2TLCN2 REF: 40.87
TLCN2 LLN: 5.75
TLCN2 PRE REF: 57.5 %
TLCN2 PRE: 3.97 L
TLCN2 REF: 6.9
VA PRE: 4.26 L
VA SINGLE BREATH LLN: 6.75
VA SINGLE BREATH PRE REF: 63.1 %
VA SINGLE BREATH REF: 6.75
VCMAXN2 LLN: 2.51
VCMAXN2 PRE REF: 62.4 %
VCMAXN2 PRE: 2.17 L
VCMAXN2 REF: 3.48

## 2021-06-16 PROCEDURE — 94727 PR PULM FUNCTION TEST BY GAS: ICD-10-PCS | Mod: 26,,, | Performed by: INTERNAL MEDICINE

## 2021-06-16 PROCEDURE — 94729 DIFFUSING CAPACITY: CPT

## 2021-06-16 PROCEDURE — 94727 GAS DIL/WSHOT DETER LNG VOL: CPT

## 2021-06-16 PROCEDURE — 94060 EVALUATION OF WHEEZING: CPT | Mod: 26,,, | Performed by: INTERNAL MEDICINE

## 2021-06-16 PROCEDURE — 94010 BREATHING CAPACITY TEST: CPT

## 2021-06-16 PROCEDURE — 25000242 PHARM REV CODE 250 ALT 637 W/ HCPCS: Performed by: NURSE PRACTITIONER

## 2021-06-16 PROCEDURE — 94060 PR EVAL OF BRONCHOSPASM: ICD-10-PCS | Mod: 26,,, | Performed by: INTERNAL MEDICINE

## 2021-06-16 PROCEDURE — 94729 PR C02/MEMBANE DIFFUSE CAPACITY: ICD-10-PCS | Mod: 26,,, | Performed by: INTERNAL MEDICINE

## 2021-06-16 PROCEDURE — 94729 DIFFUSING CAPACITY: CPT | Mod: 26,,, | Performed by: INTERNAL MEDICINE

## 2021-06-16 PROCEDURE — 94727 GAS DIL/WSHOT DETER LNG VOL: CPT | Mod: 26,,, | Performed by: INTERNAL MEDICINE

## 2021-06-16 RX ORDER — ALBUTEROL SULFATE 2.5 MG/.5ML
2.5 SOLUTION RESPIRATORY (INHALATION) ONCE
Status: COMPLETED | OUTPATIENT
Start: 2021-06-16 | End: 2021-06-16

## 2021-06-16 RX ADMIN — ALBUTEROL SULFATE 2.5 MG: 2.5 SOLUTION RESPIRATORY (INHALATION) at 09:06

## 2021-06-18 ENCOUNTER — HOSPITAL ENCOUNTER (OUTPATIENT)
Dept: CARDIOLOGY | Facility: HOSPITAL | Age: 71
Discharge: HOME | End: 2021-06-18
Attending: NURSE PRACTITIONER
Payer: MEDICARE

## 2021-06-18 ENCOUNTER — HOSPITAL ENCOUNTER (OUTPATIENT)
Dept: RADIOLOGY | Facility: HOSPITAL | Age: 71
Discharge: HOME | End: 2021-06-18
Attending: INTERNAL MEDICINE
Payer: MEDICARE

## 2021-06-18 ENCOUNTER — PATIENT MESSAGE (OUTPATIENT)
Dept: ADMINISTRATIVE | Facility: HOSPITAL | Age: 71
End: 2021-06-18

## 2021-06-18 ENCOUNTER — HOSPITAL ENCOUNTER (OUTPATIENT)
Dept: CARDIOLOGY | Facility: HOSPITAL | Age: 71
Discharge: HOME | End: 2021-06-18
Attending: INTERNAL MEDICINE
Payer: MEDICARE

## 2021-06-18 ENCOUNTER — PATIENT OUTREACH (OUTPATIENT)
Dept: ADMINISTRATIVE | Facility: HOSPITAL | Age: 71
End: 2021-06-18

## 2021-06-18 VITALS — BODY MASS INDEX: 46.65 KG/M2 | WEIGHT: 315 LBS | HEIGHT: 69 IN

## 2021-06-18 DIAGNOSIS — R06.02 SOBOE (SHORTNESS OF BREATH ON EXERTION): ICD-10-CM

## 2021-06-18 DIAGNOSIS — Z78.9 PATIENT UNABLE TO EXERCISE: ICD-10-CM

## 2021-06-18 LAB
AORTIC ROOT ANNULUS: 3.72 CM
AORTIC VALVE CUSP SEPERATION: 2.51 CM
ASCENDING AORTA: 3.34 CM
AV INDEX (PROSTH): 1
AV MEAN GRADIENT: 3 MMHG
AV PEAK GRADIENT: 6 MMHG
AV VALVE AREA: 5.92 CM2
AV VELOCITY RATIO: 0.83
BSA FOR ECHO PROCEDURE: 2.64 M2
CV ECHO LV RWT: 0.5 CM
CV STRESS BASE HR: 77 BPM
DIASTOLIC BLOOD PRESSURE: 85 MMHG
DOP CALC AO PEAK VEL: 1.22 M/S
DOP CALC AO VTI: 23.02 CM
DOP CALC LVOT AREA: 5.9 CM2
DOP CALC LVOT DIAMETER: 2.74 CM
DOP CALC LVOT PEAK VEL: 1.01 M/S
DOP CALC LVOT STROKE VOLUME: 136.32 CM3
DOP CALCLVOT PEAK VEL VTI: 23.13 CM
E WAVE DECELERATION TIME: 226.68 MSEC
E/A RATIO: 0.73
E/E' RATIO: 6.71 M/S
ECHO LV POSTERIOR WALL: 1.09 CM (ref 0.6–1.1)
EJECTION FRACTION: 65 %
FRACTIONAL SHORTENING: 28 % (ref 28–44)
INTERVENTRICULAR SEPTUM: 1.03 CM (ref 0.6–1.1)
IVRT: 114.19 MSEC
LA MAJOR: 5.64 CM
LA MINOR: 5.61 CM
LA WIDTH: 3.93 CM
LEFT ATRIUM SIZE: 4.34 CM
LEFT ATRIUM VOLUME INDEX: 32.5 ML/M2
LEFT ATRIUM VOLUME: 81.55 CM3
LEFT INTERNAL DIMENSION IN SYSTOLE: 3.12 CM (ref 2.1–4)
LEFT VENTRICLE DIASTOLIC VOLUME INDEX: 34.08 ML/M2
LEFT VENTRICLE DIASTOLIC VOLUME: 85.55 ML
LEFT VENTRICLE MASS INDEX: 63 G/M2
LEFT VENTRICLE SYSTOLIC VOLUME INDEX: 15.3 ML/M2
LEFT VENTRICLE SYSTOLIC VOLUME: 38.4 ML
LEFT VENTRICULAR INTERNAL DIMENSION IN DIASTOLE: 4.35 CM (ref 3.5–6)
LEFT VENTRICULAR MASS: 157.45 G
LV LATERAL E/E' RATIO: 4.38 M/S
LV SEPTAL E/E' RATIO: 14.25 M/S
MV PEAK A VEL: 0.78 M/S
MV PEAK E VEL: 0.57 M/S
NUC STRESS DIASTOLIC VOLUME INDEX: 90
NUC STRESS EJECTION FRACTION: 44 %
NUC STRESS SYSTOLIC VOLUME INDEX: 50
OHS CV CPX 85 PERCENT MAX PREDICTED HEART RATE MALE: 128
OHS CV CPX MAX PREDICTED HEART RATE: 151
OHS CV CPX PATIENT IS FEMALE: 0
OHS CV CPX PATIENT IS MALE: 1
OHS CV CPX PEAK DIASTOLIC BLOOD PRESSURE: 63 MMHG
OHS CV CPX PEAK HEAR RATE: 95 BPM
OHS CV CPX PEAK RATE PRESSURE PRODUCT: NORMAL
OHS CV CPX PEAK SYSTOLIC BLOOD PRESSURE: 121 MMHG
OHS CV CPX PERCENT MAX PREDICTED HEART RATE ACHIEVED: 63
OHS CV CPX RATE PRESSURE PRODUCT PRESENTING: NORMAL
PISA TR MAX VEL: 2.57 M/S
PULM VEIN S/D RATIO: 1.75
PV PEAK D VEL: 0.32 M/S
PV PEAK S VEL: 0.56 M/S
PV PEAK VELOCITY: 1.21 CM/S
RA MAJOR: 4.69 CM
RA PRESSURE: 15 MMHG
RA WIDTH: 3.89 CM
RIGHT VENTRICULAR END-DIASTOLIC DIMENSION: 3.65 CM
RV TISSUE DOPPLER FREE WALL SYSTOLIC VELOCITY 1 (APICAL 4 CHAMBER VIEW): 11.14 CM/S
SINUS: 3.77 CM
STJ: 3.07 CM
SYSTOLIC BLOOD PRESSURE: 137 MMHG
TDI LATERAL: 0.13 M/S
TDI SEPTAL: 0.04 M/S
TDI: 0.09 M/S
TR MAX PG: 26 MMHG
TRICUSPID ANNULAR PLANE SYSTOLIC EXCURSION: 1.92 CM
TV REST PULMONARY ARTERY PRESSURE: 41 MMHG

## 2021-06-18 PROCEDURE — 93306 TTE W/DOPPLER COMPLETE: CPT | Mod: 26,,, | Performed by: INTERNAL MEDICINE

## 2021-06-18 PROCEDURE — 93306 TTE W/DOPPLER COMPLETE: CPT

## 2021-06-18 PROCEDURE — 78452 HT MUSCLE IMAGE SPECT MULT: CPT | Mod: 26,,, | Performed by: INTERNAL MEDICINE

## 2021-06-18 PROCEDURE — 93017 CV STRESS TEST TRACING ONLY: CPT

## 2021-06-18 PROCEDURE — 63600175 PHARM REV CODE 636 W HCPCS: Performed by: INTERNAL MEDICINE

## 2021-06-18 PROCEDURE — 78452 STRESS TEST WITH MYOCARDIAL PERFUSION (CUPID ONLY): ICD-10-PCS | Mod: 26,,, | Performed by: INTERNAL MEDICINE

## 2021-06-18 PROCEDURE — 93018 CV STRESS TEST I&R ONLY: CPT | Mod: ,,, | Performed by: INTERNAL MEDICINE

## 2021-06-18 PROCEDURE — 93306 ECHO (CUPID ONLY): ICD-10-PCS | Mod: 26,,, | Performed by: INTERNAL MEDICINE

## 2021-06-18 PROCEDURE — 93016 CV STRESS TEST SUPVJ ONLY: CPT | Mod: ,,, | Performed by: INTERNAL MEDICINE

## 2021-06-18 PROCEDURE — 93016 STRESS TEST WITH MYOCARDIAL PERFUSION (CUPID ONLY): ICD-10-PCS | Mod: ,,, | Performed by: INTERNAL MEDICINE

## 2021-06-18 PROCEDURE — A9502 TC99M TETROFOSMIN: HCPCS

## 2021-06-18 PROCEDURE — 78452 HT MUSCLE IMAGE SPECT MULT: CPT

## 2021-06-18 PROCEDURE — 93018 STRESS TEST WITH MYOCARDIAL PERFUSION (CUPID ONLY): ICD-10-PCS | Mod: ,,, | Performed by: INTERNAL MEDICINE

## 2021-06-18 RX ORDER — REGADENOSON 0.08 MG/ML
0.4 INJECTION, SOLUTION INTRAVENOUS ONCE
Status: COMPLETED | OUTPATIENT
Start: 2021-06-18 | End: 2021-06-18

## 2021-06-18 RX ADMIN — REGADENOSON 0.4 MG: 0.08 INJECTION, SOLUTION INTRAVENOUS at 08:06

## 2021-07-06 ENCOUNTER — PATIENT MESSAGE (OUTPATIENT)
Dept: PULMONOLOGY | Facility: CLINIC | Age: 71
End: 2021-07-06

## 2021-07-06 ENCOUNTER — HOSPITAL ENCOUNTER (OUTPATIENT)
Dept: RADIOLOGY | Facility: HOSPITAL | Age: 71
Discharge: HOME | End: 2021-07-06
Attending: NURSE PRACTITIONER
Payer: MEDICARE

## 2021-07-06 DIAGNOSIS — I27.20 PULMONARY HYPERTENSION: ICD-10-CM

## 2021-07-06 DIAGNOSIS — R06.02 SOBOE (SHORTNESS OF BREATH ON EXERTION): ICD-10-CM

## 2021-07-06 DIAGNOSIS — D64.9 ANEMIA, UNSPECIFIED TYPE: ICD-10-CM

## 2021-07-06 DIAGNOSIS — R26.9 GAIT ABNORMALITY: ICD-10-CM

## 2021-07-06 PROCEDURE — 71046 X-RAY EXAM CHEST 2 VIEWS: CPT | Mod: 26,,, | Performed by: RADIOLOGY

## 2021-07-06 PROCEDURE — 78582 NM LUNG VENTILATION AND PERFUSION IMAGING: ICD-10-PCS | Mod: 26,,, | Performed by: RADIOLOGY

## 2021-07-06 PROCEDURE — 93970 US LOWER EXTREMITY VEINS BILATERAL: ICD-10-PCS | Mod: 26,,, | Performed by: RADIOLOGY

## 2021-07-06 PROCEDURE — 78582 LUNG VENTILAT&PERFUS IMAGING: CPT | Mod: TC

## 2021-07-06 PROCEDURE — 93970 EXTREMITY STUDY: CPT | Mod: TC

## 2021-07-06 PROCEDURE — 71046 X-RAY EXAM CHEST 2 VIEWS: CPT | Mod: TC,FY

## 2021-07-06 PROCEDURE — 71046 XR CHEST PA AND LATERAL: ICD-10-PCS | Mod: 26,,, | Performed by: RADIOLOGY

## 2021-07-06 PROCEDURE — 78582 LUNG VENTILAT&PERFUS IMAGING: CPT | Mod: 26,,, | Performed by: RADIOLOGY

## 2021-07-06 PROCEDURE — 93970 EXTREMITY STUDY: CPT | Mod: 26,,, | Performed by: RADIOLOGY

## 2021-07-07 ENCOUNTER — OFFICE VISIT (OUTPATIENT)
Dept: FAMILY MEDICINE | Facility: CLINIC | Age: 71
End: 2021-07-07
Payer: MEDICARE

## 2021-07-07 VITALS
DIASTOLIC BLOOD PRESSURE: 82 MMHG | WEIGHT: 311.75 LBS | HEART RATE: 88 BPM | TEMPERATURE: 99 F | SYSTOLIC BLOOD PRESSURE: 138 MMHG | OXYGEN SATURATION: 97 % | BODY MASS INDEX: 46.17 KG/M2 | RESPIRATION RATE: 16 BRPM | HEIGHT: 69 IN

## 2021-07-07 DIAGNOSIS — M51.36 DDD (DEGENERATIVE DISC DISEASE), LUMBAR: ICD-10-CM

## 2021-07-07 DIAGNOSIS — I10 ESSENTIAL HYPERTENSION: Primary | ICD-10-CM

## 2021-07-07 PROCEDURE — 99999 PR PBB SHADOW E&M-EST. PATIENT-LVL IV: ICD-10-PCS | Mod: PBBFAC,,, | Performed by: FAMILY MEDICINE

## 2021-07-07 PROCEDURE — 1160F RVW MEDS BY RX/DR IN RCRD: CPT | Mod: CPTII,S$GLB,, | Performed by: FAMILY MEDICINE

## 2021-07-07 PROCEDURE — 1125F AMNT PAIN NOTED PAIN PRSNT: CPT | Mod: CPTII,S$GLB,, | Performed by: FAMILY MEDICINE

## 2021-07-07 PROCEDURE — 3288F FALL RISK ASSESSMENT DOCD: CPT | Mod: CPTII,S$GLB,, | Performed by: FAMILY MEDICINE

## 2021-07-07 PROCEDURE — 1160F PR REVIEW ALL MEDS BY PRESCRIBER/CLIN PHARMACIST DOCUMENTED: ICD-10-PCS | Mod: CPTII,S$GLB,, | Performed by: FAMILY MEDICINE

## 2021-07-07 PROCEDURE — 1159F PR MEDICATION LIST DOCUMENTED IN MEDICAL RECORD: ICD-10-PCS | Mod: CPTII,S$GLB,, | Performed by: FAMILY MEDICINE

## 2021-07-07 PROCEDURE — 99214 OFFICE O/P EST MOD 30 MIN: CPT | Mod: S$GLB,,, | Performed by: FAMILY MEDICINE

## 2021-07-07 PROCEDURE — 99499 RISK ADDL DX/OHS AUDIT: ICD-10-PCS | Mod: S$GLB,,, | Performed by: FAMILY MEDICINE

## 2021-07-07 PROCEDURE — 99999 PR PBB SHADOW E&M-EST. PATIENT-LVL IV: CPT | Mod: PBBFAC,,, | Performed by: FAMILY MEDICINE

## 2021-07-07 PROCEDURE — 3044F HG A1C LEVEL LT 7.0%: CPT | Mod: CPTII,S$GLB,, | Performed by: FAMILY MEDICINE

## 2021-07-07 PROCEDURE — 3044F PR MOST RECENT HEMOGLOBIN A1C LEVEL <7.0%: ICD-10-PCS | Mod: CPTII,S$GLB,, | Performed by: FAMILY MEDICINE

## 2021-07-07 PROCEDURE — 3079F DIAST BP 80-89 MM HG: CPT | Mod: CPTII,S$GLB,, | Performed by: FAMILY MEDICINE

## 2021-07-07 PROCEDURE — 1125F PR PAIN SEVERITY QUANTIFIED, PAIN PRESENT: ICD-10-PCS | Mod: CPTII,S$GLB,, | Performed by: FAMILY MEDICINE

## 2021-07-07 PROCEDURE — 3008F PR BODY MASS INDEX (BMI) DOCUMENTED: ICD-10-PCS | Mod: CPTII,S$GLB,, | Performed by: FAMILY MEDICINE

## 2021-07-07 PROCEDURE — 99214 PR OFFICE/OUTPT VISIT, EST, LEVL IV, 30-39 MIN: ICD-10-PCS | Mod: S$GLB,,, | Performed by: FAMILY MEDICINE

## 2021-07-07 PROCEDURE — 1159F MED LIST DOCD IN RCRD: CPT | Mod: CPTII,S$GLB,, | Performed by: FAMILY MEDICINE

## 2021-07-07 PROCEDURE — 3079F PR MOST RECENT DIASTOLIC BLOOD PRESSURE 80-89 MM HG: ICD-10-PCS | Mod: CPTII,S$GLB,, | Performed by: FAMILY MEDICINE

## 2021-07-07 PROCEDURE — 1101F PT FALLS ASSESS-DOCD LE1/YR: CPT | Mod: CPTII,S$GLB,, | Performed by: FAMILY MEDICINE

## 2021-07-07 PROCEDURE — 3075F PR MOST RECENT SYSTOLIC BLOOD PRESS GE 130-139MM HG: ICD-10-PCS | Mod: CPTII,S$GLB,, | Performed by: FAMILY MEDICINE

## 2021-07-07 PROCEDURE — 99499 UNLISTED E&M SERVICE: CPT | Mod: S$GLB,,, | Performed by: FAMILY MEDICINE

## 2021-07-07 PROCEDURE — 3288F PR FALLS RISK ASSESSMENT DOCUMENTED: ICD-10-PCS | Mod: CPTII,S$GLB,, | Performed by: FAMILY MEDICINE

## 2021-07-07 PROCEDURE — 3075F SYST BP GE 130 - 139MM HG: CPT | Mod: CPTII,S$GLB,, | Performed by: FAMILY MEDICINE

## 2021-07-07 PROCEDURE — 1101F PR PT FALLS ASSESS DOC 0-1 FALLS W/OUT INJ PAST YR: ICD-10-PCS | Mod: CPTII,S$GLB,, | Performed by: FAMILY MEDICINE

## 2021-07-07 PROCEDURE — 3008F BODY MASS INDEX DOCD: CPT | Mod: CPTII,S$GLB,, | Performed by: FAMILY MEDICINE

## 2021-07-07 RX ORDER — CYCLOBENZAPRINE HCL 5 MG
TABLET ORAL
Qty: 60 TABLET | Refills: 11 | Status: CANCELLED | OUTPATIENT
Start: 2021-07-07

## 2021-07-07 RX ORDER — TIZANIDINE 4 MG/1
TABLET ORAL
Qty: 60 TABLET | Refills: 2 | Status: SHIPPED | OUTPATIENT
Start: 2021-07-07 | End: 2022-07-08

## 2021-07-07 RX ORDER — TRAMADOL HYDROCHLORIDE 50 MG/1
50 TABLET ORAL EVERY 12 HOURS PRN
Qty: 60 TABLET | Refills: 1 | Status: SHIPPED | OUTPATIENT
Start: 2021-07-07 | End: 2022-07-08

## 2021-07-08 ENCOUNTER — TELEPHONE (OUTPATIENT)
Dept: PULMONOLOGY | Facility: CLINIC | Age: 71
End: 2021-07-08

## 2021-07-08 ENCOUNTER — PATIENT OUTREACH (OUTPATIENT)
Dept: ADMINISTRATIVE | Facility: OTHER | Age: 71
End: 2021-07-08

## 2021-07-13 ENCOUNTER — OFFICE VISIT (OUTPATIENT)
Dept: CARDIOLOGY | Facility: CLINIC | Age: 71
End: 2021-07-13
Payer: MEDICARE

## 2021-07-13 VITALS
HEART RATE: 92 BPM | RESPIRATION RATE: 15 BRPM | DIASTOLIC BLOOD PRESSURE: 74 MMHG | BODY MASS INDEX: 46.4 KG/M2 | WEIGHT: 313.25 LBS | HEIGHT: 69 IN | SYSTOLIC BLOOD PRESSURE: 130 MMHG | OXYGEN SATURATION: 98 %

## 2021-07-13 DIAGNOSIS — R06.02 SOBOE (SHORTNESS OF BREATH ON EXERTION): Primary | ICD-10-CM

## 2021-07-13 DIAGNOSIS — E66.01 MORBID OBESITY WITH BMI OF 45.0-49.9, ADULT: ICD-10-CM

## 2021-07-13 DIAGNOSIS — R94.39 ABNORMAL NUCLEAR STRESS TEST: ICD-10-CM

## 2021-07-13 DIAGNOSIS — N18.31 STAGE 3A CHRONIC KIDNEY DISEASE: ICD-10-CM

## 2021-07-13 DIAGNOSIS — I27.20 PULMONARY HYPERTENSION: ICD-10-CM

## 2021-07-13 DIAGNOSIS — E78.2 MIXED HYPERLIPIDEMIA: ICD-10-CM

## 2021-07-13 DIAGNOSIS — I77.810 AORTIC ROOT DILATATION: ICD-10-CM

## 2021-07-13 DIAGNOSIS — I10 ESSENTIAL HYPERTENSION: ICD-10-CM

## 2021-07-13 PROCEDURE — 3008F PR BODY MASS INDEX (BMI) DOCUMENTED: ICD-10-PCS | Mod: CPTII,S$GLB,, | Performed by: INTERNAL MEDICINE

## 2021-07-13 PROCEDURE — 3075F PR MOST RECENT SYSTOLIC BLOOD PRESS GE 130-139MM HG: ICD-10-PCS | Mod: CPTII,S$GLB,, | Performed by: INTERNAL MEDICINE

## 2021-07-13 PROCEDURE — 1126F AMNT PAIN NOTED NONE PRSNT: CPT | Mod: S$GLB,,, | Performed by: INTERNAL MEDICINE

## 2021-07-13 PROCEDURE — 3075F SYST BP GE 130 - 139MM HG: CPT | Mod: CPTII,S$GLB,, | Performed by: INTERNAL MEDICINE

## 2021-07-13 PROCEDURE — 3008F BODY MASS INDEX DOCD: CPT | Mod: CPTII,S$GLB,, | Performed by: INTERNAL MEDICINE

## 2021-07-13 PROCEDURE — 99999 PR PBB SHADOW E&M-EST. PATIENT-LVL V: CPT | Mod: PBBFAC,,, | Performed by: INTERNAL MEDICINE

## 2021-07-13 PROCEDURE — 1101F PR PT FALLS ASSESS DOC 0-1 FALLS W/OUT INJ PAST YR: ICD-10-PCS | Mod: CPTII,S$GLB,, | Performed by: INTERNAL MEDICINE

## 2021-07-13 PROCEDURE — 3078F DIAST BP <80 MM HG: CPT | Mod: CPTII,S$GLB,, | Performed by: INTERNAL MEDICINE

## 2021-07-13 PROCEDURE — 99499 RISK ADDL DX/OHS AUDIT: ICD-10-PCS | Mod: S$GLB,,, | Performed by: INTERNAL MEDICINE

## 2021-07-13 PROCEDURE — 1159F PR MEDICATION LIST DOCUMENTED IN MEDICAL RECORD: ICD-10-PCS | Mod: S$GLB,,, | Performed by: INTERNAL MEDICINE

## 2021-07-13 PROCEDURE — 3288F FALL RISK ASSESSMENT DOCD: CPT | Mod: CPTII,S$GLB,, | Performed by: INTERNAL MEDICINE

## 2021-07-13 PROCEDURE — 3078F PR MOST RECENT DIASTOLIC BLOOD PRESSURE < 80 MM HG: ICD-10-PCS | Mod: CPTII,S$GLB,, | Performed by: INTERNAL MEDICINE

## 2021-07-13 PROCEDURE — 99999 PR PBB SHADOW E&M-EST. PATIENT-LVL V: ICD-10-PCS | Mod: PBBFAC,,, | Performed by: INTERNAL MEDICINE

## 2021-07-13 PROCEDURE — 1159F MED LIST DOCD IN RCRD: CPT | Mod: S$GLB,,, | Performed by: INTERNAL MEDICINE

## 2021-07-13 PROCEDURE — 99499 UNLISTED E&M SERVICE: CPT | Mod: S$GLB,,, | Performed by: INTERNAL MEDICINE

## 2021-07-13 PROCEDURE — 1101F PT FALLS ASSESS-DOCD LE1/YR: CPT | Mod: CPTII,S$GLB,, | Performed by: INTERNAL MEDICINE

## 2021-07-13 PROCEDURE — 99215 PR OFFICE/OUTPT VISIT, EST, LEVL V, 40-54 MIN: ICD-10-PCS | Mod: S$GLB,,, | Performed by: INTERNAL MEDICINE

## 2021-07-13 PROCEDURE — 99215 OFFICE O/P EST HI 40 MIN: CPT | Mod: S$GLB,,, | Performed by: INTERNAL MEDICINE

## 2021-07-13 PROCEDURE — 1126F PR PAIN SEVERITY QUANTIFIED, NO PAIN PRESENT: ICD-10-PCS | Mod: S$GLB,,, | Performed by: INTERNAL MEDICINE

## 2021-07-13 PROCEDURE — 3288F PR FALLS RISK ASSESSMENT DOCUMENTED: ICD-10-PCS | Mod: CPTII,S$GLB,, | Performed by: INTERNAL MEDICINE

## 2021-07-13 RX ORDER — SODIUM CHLORIDE 9 MG/ML
INJECTION, SOLUTION INTRAVENOUS CONTINUOUS
Status: CANCELLED | OUTPATIENT
Start: 2021-08-10 | End: 2021-08-10

## 2021-07-13 RX ORDER — CLOPIDOGREL BISULFATE 75 MG/1
75 TABLET ORAL DAILY
Qty: 30 TABLET | Refills: 11 | Status: ON HOLD | OUTPATIENT
Start: 2021-07-13 | End: 2021-09-21 | Stop reason: HOSPADM

## 2021-07-13 RX ORDER — DIPHENHYDRAMINE HCL 25 MG
50 CAPSULE ORAL ONCE
Status: CANCELLED | OUTPATIENT
Start: 2021-08-10

## 2021-07-15 ENCOUNTER — LAB VISIT (OUTPATIENT)
Dept: LAB | Facility: HOSPITAL | Age: 71
End: 2021-07-15
Attending: FAMILY MEDICINE
Payer: MEDICARE

## 2021-07-15 DIAGNOSIS — Z12.11 COLON CANCER SCREENING: ICD-10-CM

## 2021-07-15 PROCEDURE — 82274 ASSAY TEST FOR BLOOD FECAL: CPT | Performed by: FAMILY MEDICINE

## 2021-07-16 ENCOUNTER — TELEPHONE (OUTPATIENT)
Dept: FAMILY MEDICINE | Facility: CLINIC | Age: 71
End: 2021-07-16

## 2021-07-22 LAB — HEMOCCULT STL QL IA: NEGATIVE

## 2021-08-04 ENCOUNTER — PATIENT MESSAGE (OUTPATIENT)
Dept: ADMINISTRATIVE | Facility: HOSPITAL | Age: 71
End: 2021-08-04

## 2021-08-25 ENCOUNTER — OFFICE VISIT (OUTPATIENT)
Dept: CARDIOLOGY | Facility: CLINIC | Age: 71
End: 2021-08-25
Payer: MEDICARE

## 2021-08-25 VITALS
HEART RATE: 90 BPM | DIASTOLIC BLOOD PRESSURE: 72 MMHG | SYSTOLIC BLOOD PRESSURE: 124 MMHG | HEIGHT: 69 IN | RESPIRATION RATE: 15 BRPM | OXYGEN SATURATION: 95 % | BODY MASS INDEX: 46.01 KG/M2 | WEIGHT: 310.63 LBS

## 2021-08-25 DIAGNOSIS — E66.01 MORBID OBESITY WITH BMI OF 45.0-49.9, ADULT: ICD-10-CM

## 2021-08-25 DIAGNOSIS — R06.02 SOBOE (SHORTNESS OF BREATH ON EXERTION): ICD-10-CM

## 2021-08-25 DIAGNOSIS — I77.810 AORTIC ROOT DILATATION: ICD-10-CM

## 2021-08-25 DIAGNOSIS — I10 ESSENTIAL HYPERTENSION: ICD-10-CM

## 2021-08-25 DIAGNOSIS — R94.39 ABNORMAL NUCLEAR STRESS TEST: Primary | ICD-10-CM

## 2021-08-25 DIAGNOSIS — N18.31 STAGE 3A CHRONIC KIDNEY DISEASE: ICD-10-CM

## 2021-08-25 DIAGNOSIS — I27.20 PULMONARY HYPERTENSION: ICD-10-CM

## 2021-08-25 DIAGNOSIS — E78.2 MIXED HYPERLIPIDEMIA: ICD-10-CM

## 2021-08-25 PROCEDURE — 99999 PR PBB SHADOW E&M-EST. PATIENT-LVL V: CPT | Mod: PBBFAC,,, | Performed by: INTERNAL MEDICINE

## 2021-08-25 PROCEDURE — 1160F RVW MEDS BY RX/DR IN RCRD: CPT | Mod: CPTII,S$GLB,, | Performed by: INTERNAL MEDICINE

## 2021-08-25 PROCEDURE — 1101F PR PT FALLS ASSESS DOC 0-1 FALLS W/OUT INJ PAST YR: ICD-10-PCS | Mod: CPTII,S$GLB,, | Performed by: INTERNAL MEDICINE

## 2021-08-25 PROCEDURE — 3074F SYST BP LT 130 MM HG: CPT | Mod: CPTII,S$GLB,, | Performed by: INTERNAL MEDICINE

## 2021-08-25 PROCEDURE — 1101F PT FALLS ASSESS-DOCD LE1/YR: CPT | Mod: CPTII,S$GLB,, | Performed by: INTERNAL MEDICINE

## 2021-08-25 PROCEDURE — 3078F DIAST BP <80 MM HG: CPT | Mod: CPTII,S$GLB,, | Performed by: INTERNAL MEDICINE

## 2021-08-25 PROCEDURE — 3288F PR FALLS RISK ASSESSMENT DOCUMENTED: ICD-10-PCS | Mod: CPTII,S$GLB,, | Performed by: INTERNAL MEDICINE

## 2021-08-25 PROCEDURE — 3078F PR MOST RECENT DIASTOLIC BLOOD PRESSURE < 80 MM HG: ICD-10-PCS | Mod: CPTII,S$GLB,, | Performed by: INTERNAL MEDICINE

## 2021-08-25 PROCEDURE — 99999 PR PBB SHADOW E&M-EST. PATIENT-LVL V: ICD-10-PCS | Mod: PBBFAC,,, | Performed by: INTERNAL MEDICINE

## 2021-08-25 PROCEDURE — 3074F PR MOST RECENT SYSTOLIC BLOOD PRESSURE < 130 MM HG: ICD-10-PCS | Mod: CPTII,S$GLB,, | Performed by: INTERNAL MEDICINE

## 2021-08-25 PROCEDURE — 1160F PR REVIEW ALL MEDS BY PRESCRIBER/CLIN PHARMACIST DOCUMENTED: ICD-10-PCS | Mod: CPTII,S$GLB,, | Performed by: INTERNAL MEDICINE

## 2021-08-25 PROCEDURE — 3044F PR MOST RECENT HEMOGLOBIN A1C LEVEL <7.0%: ICD-10-PCS | Mod: CPTII,S$GLB,, | Performed by: INTERNAL MEDICINE

## 2021-08-25 PROCEDURE — 99215 OFFICE O/P EST HI 40 MIN: CPT | Mod: S$GLB,,, | Performed by: INTERNAL MEDICINE

## 2021-08-25 PROCEDURE — 3008F BODY MASS INDEX DOCD: CPT | Mod: CPTII,S$GLB,, | Performed by: INTERNAL MEDICINE

## 2021-08-25 PROCEDURE — 99215 PR OFFICE/OUTPT VISIT, EST, LEVL V, 40-54 MIN: ICD-10-PCS | Mod: S$GLB,,, | Performed by: INTERNAL MEDICINE

## 2021-08-25 PROCEDURE — 1126F PR PAIN SEVERITY QUANTIFIED, NO PAIN PRESENT: ICD-10-PCS | Mod: CPTII,S$GLB,, | Performed by: INTERNAL MEDICINE

## 2021-08-25 PROCEDURE — 1159F MED LIST DOCD IN RCRD: CPT | Mod: CPTII,S$GLB,, | Performed by: INTERNAL MEDICINE

## 2021-08-25 PROCEDURE — 3044F HG A1C LEVEL LT 7.0%: CPT | Mod: CPTII,S$GLB,, | Performed by: INTERNAL MEDICINE

## 2021-08-25 PROCEDURE — 3288F FALL RISK ASSESSMENT DOCD: CPT | Mod: CPTII,S$GLB,, | Performed by: INTERNAL MEDICINE

## 2021-08-25 PROCEDURE — 1159F PR MEDICATION LIST DOCUMENTED IN MEDICAL RECORD: ICD-10-PCS | Mod: CPTII,S$GLB,, | Performed by: INTERNAL MEDICINE

## 2021-08-25 PROCEDURE — 3008F PR BODY MASS INDEX (BMI) DOCUMENTED: ICD-10-PCS | Mod: CPTII,S$GLB,, | Performed by: INTERNAL MEDICINE

## 2021-08-25 PROCEDURE — 1126F AMNT PAIN NOTED NONE PRSNT: CPT | Mod: CPTII,S$GLB,, | Performed by: INTERNAL MEDICINE

## 2021-08-25 RX ORDER — SODIUM CHLORIDE 9 MG/ML
INJECTION, SOLUTION INTRAVENOUS CONTINUOUS
Status: CANCELLED | OUTPATIENT
Start: 2021-09-02 | End: 2021-09-02

## 2021-08-25 RX ORDER — DIPHENHYDRAMINE HCL 25 MG
50 CAPSULE ORAL ONCE
Status: CANCELLED | OUTPATIENT
Start: 2021-09-02

## 2021-08-30 ENCOUNTER — TELEPHONE (OUTPATIENT)
Dept: PREADMISSION TESTING | Facility: HOSPITAL | Age: 71
End: 2021-08-30

## 2021-09-01 ENCOUNTER — PATIENT MESSAGE (OUTPATIENT)
Dept: CARDIOLOGY | Facility: HOSPITAL | Age: 71
End: 2021-09-01

## 2021-09-15 RX ORDER — DIPHENHYDRAMINE HCL 25 MG
50 CAPSULE ORAL ONCE
Status: CANCELLED | OUTPATIENT
Start: 2021-09-21

## 2021-09-15 RX ORDER — SODIUM CHLORIDE 9 MG/ML
INJECTION, SOLUTION INTRAVENOUS CONTINUOUS
Status: CANCELLED | OUTPATIENT
Start: 2021-09-21 | End: 2021-09-21

## 2021-09-20 ENCOUNTER — HOSPITAL ENCOUNTER (OUTPATIENT)
Dept: PREADMISSION TESTING | Facility: HOSPITAL | Age: 71
Discharge: HOME | End: 2021-09-20
Attending: INTERNAL MEDICINE
Payer: MEDICARE

## 2021-09-20 VITALS
HEART RATE: 74 BPM | HEIGHT: 69 IN | TEMPERATURE: 98 F | SYSTOLIC BLOOD PRESSURE: 143 MMHG | DIASTOLIC BLOOD PRESSURE: 85 MMHG | OXYGEN SATURATION: 98 % | RESPIRATION RATE: 18 BRPM | WEIGHT: 315 LBS | BODY MASS INDEX: 46.65 KG/M2

## 2021-09-20 DIAGNOSIS — Z11.52 ENCOUNTER FOR PREOPERATIVE SCREENING LABORATORY TESTING FOR COVID-19 VIRUS: ICD-10-CM

## 2021-09-20 DIAGNOSIS — Z01.812 ENCOUNTER FOR PREOPERATIVE SCREENING LABORATORY TESTING FOR COVID-19 VIRUS: ICD-10-CM

## 2021-09-20 LAB
ANION GAP SERPL CALC-SCNC: 9 MMOL/L (ref 8–16)
BASOPHILS # BLD AUTO: 0.03 K/UL (ref 0–0.2)
BASOPHILS NFR BLD: 0.5 % (ref 0–1.9)
BUN SERPL-MCNC: 15 MG/DL (ref 8–23)
CALCIUM SERPL-MCNC: 9.4 MG/DL (ref 8.7–10.5)
CHLORIDE SERPL-SCNC: 103 MMOL/L (ref 95–110)
CO2 SERPL-SCNC: 28 MMOL/L (ref 23–29)
CREAT SERPL-MCNC: 1.6 MG/DL (ref 0.5–1.4)
DIFFERENTIAL METHOD: ABNORMAL
EOSINOPHIL # BLD AUTO: 0.2 K/UL (ref 0–0.5)
EOSINOPHIL NFR BLD: 3.5 % (ref 0–8)
ERYTHROCYTE [DISTWIDTH] IN BLOOD BY AUTOMATED COUNT: 15.4 % (ref 11.5–14.5)
EST. GFR  (AFRICAN AMERICAN): 50 ML/MIN/1.73 M^2
EST. GFR  (NON AFRICAN AMERICAN): 43 ML/MIN/1.73 M^2
GLUCOSE SERPL-MCNC: 107 MG/DL (ref 70–110)
HCT VFR BLD AUTO: 38.5 % (ref 40–54)
HGB BLD-MCNC: 12.5 G/DL (ref 14–18)
IMM GRANULOCYTES # BLD AUTO: 0.01 K/UL (ref 0–0.04)
IMM GRANULOCYTES NFR BLD AUTO: 0.2 % (ref 0–0.5)
INR PPP: 1 (ref 0.8–1.2)
LYMPHOCYTES # BLD AUTO: 2.2 K/UL (ref 1–4.8)
LYMPHOCYTES NFR BLD: 34.1 % (ref 18–48)
MCH RBC QN AUTO: 28.5 PG (ref 27–31)
MCHC RBC AUTO-ENTMCNC: 32.5 G/DL (ref 32–36)
MCV RBC AUTO: 88 FL (ref 82–98)
MONOCYTES # BLD AUTO: 1 K/UL (ref 0.3–1)
MONOCYTES NFR BLD: 15.1 % (ref 4–15)
NEUTROPHILS # BLD AUTO: 3.1 K/UL (ref 1.8–7.7)
NEUTROPHILS NFR BLD: 46.6 % (ref 38–73)
NRBC BLD-RTO: 0 /100 WBC
PLATELET # BLD AUTO: 189 K/UL (ref 150–450)
PMV BLD AUTO: 10.4 FL (ref 9.2–12.9)
POTASSIUM SERPL-SCNC: 3.9 MMOL/L (ref 3.5–5.1)
PROTHROMBIN TIME: 10.9 SEC (ref 9–12.5)
RBC # BLD AUTO: 4.38 M/UL (ref 4.6–6.2)
SARS-COV-2 RDRP RESP QL NAA+PROBE: NEGATIVE
SODIUM SERPL-SCNC: 140 MMOL/L (ref 136–145)
WBC # BLD AUTO: 6.57 K/UL (ref 3.9–12.7)

## 2021-09-20 PROCEDURE — U0002 COVID-19 LAB TEST NON-CDC: HCPCS | Performed by: INTERNAL MEDICINE

## 2021-09-20 PROCEDURE — 85025 COMPLETE CBC W/AUTO DIFF WBC: CPT | Performed by: INTERNAL MEDICINE

## 2021-09-20 PROCEDURE — 80048 BASIC METABOLIC PNL TOTAL CA: CPT | Performed by: INTERNAL MEDICINE

## 2021-09-20 PROCEDURE — 85610 PROTHROMBIN TIME: CPT | Performed by: INTERNAL MEDICINE

## 2021-09-21 ENCOUNTER — HOSPITAL ENCOUNTER (OUTPATIENT)
Facility: HOSPITAL | Age: 71
Discharge: HOME | End: 2021-09-21
Attending: INTERNAL MEDICINE | Admitting: INTERNAL MEDICINE
Payer: MEDICARE

## 2021-09-21 VITALS
HEART RATE: 74 BPM | SYSTOLIC BLOOD PRESSURE: 137 MMHG | DIASTOLIC BLOOD PRESSURE: 76 MMHG | OXYGEN SATURATION: 96 % | RESPIRATION RATE: 20 BRPM | TEMPERATURE: 98 F | BODY MASS INDEX: 47.96 KG/M2 | WEIGHT: 315 LBS

## 2021-09-21 DIAGNOSIS — R07.9 CHEST PAIN: ICD-10-CM

## 2021-09-21 DIAGNOSIS — R94.39 ABNORMAL NUCLEAR STRESS TEST: ICD-10-CM

## 2021-09-21 DIAGNOSIS — Z01.812 ENCOUNTER FOR PREOPERATIVE SCREENING LABORATORY TESTING FOR COVID-19 VIRUS: ICD-10-CM

## 2021-09-21 DIAGNOSIS — R06.02 SOBOE (SHORTNESS OF BREATH ON EXERTION): Primary | ICD-10-CM

## 2021-09-21 DIAGNOSIS — Z11.52 ENCOUNTER FOR PREOPERATIVE SCREENING LABORATORY TESTING FOR COVID-19 VIRUS: ICD-10-CM

## 2021-09-21 PROCEDURE — 99152 PR MOD CONSCIOUS SEDATION, SAME PHYS, 5+ YRS, FIRST 15 MIN: ICD-10-PCS | Mod: ,,, | Performed by: INTERNAL MEDICINE

## 2021-09-21 PROCEDURE — C1894 INTRO/SHEATH, NON-LASER: HCPCS | Performed by: INTERNAL MEDICINE

## 2021-09-21 PROCEDURE — C1769 GUIDE WIRE: HCPCS | Performed by: INTERNAL MEDICINE

## 2021-09-21 PROCEDURE — A4216 STERILE WATER/SALINE, 10 ML: HCPCS | Performed by: INTERNAL MEDICINE

## 2021-09-21 PROCEDURE — 93458 L HRT ARTERY/VENTRICLE ANGIO: CPT | Performed by: INTERNAL MEDICINE

## 2021-09-21 PROCEDURE — 93458 PR CATH PLACE/CORON ANGIO, IMG SUPER/INTERP,W LEFT HEART VENTRICULOGRAPHY: ICD-10-PCS | Mod: 26,,, | Performed by: INTERNAL MEDICINE

## 2021-09-21 PROCEDURE — 93458 L HRT ARTERY/VENTRICLE ANGIO: CPT | Mod: 26,,, | Performed by: INTERNAL MEDICINE

## 2021-09-21 PROCEDURE — 99152 MOD SED SAME PHYS/QHP 5/>YRS: CPT | Mod: ,,, | Performed by: INTERNAL MEDICINE

## 2021-09-21 PROCEDURE — 63600175 PHARM REV CODE 636 W HCPCS: Performed by: INTERNAL MEDICINE

## 2021-09-21 PROCEDURE — 25500020 PHARM REV CODE 255: Performed by: INTERNAL MEDICINE

## 2021-09-21 PROCEDURE — 99152 MOD SED SAME PHYS/QHP 5/>YRS: CPT | Performed by: INTERNAL MEDICINE

## 2021-09-21 PROCEDURE — 25000003 PHARM REV CODE 250: Performed by: INTERNAL MEDICINE

## 2021-09-21 PROCEDURE — C1887 CATHETER, GUIDING: HCPCS | Performed by: INTERNAL MEDICINE

## 2021-09-21 RX ORDER — ATROPINE SULFATE 0.1 MG/ML
0.5 INJECTION INTRAVENOUS
Status: DISCONTINUED | OUTPATIENT
Start: 2021-09-21 | End: 2021-09-21 | Stop reason: HOSPADM

## 2021-09-21 RX ORDER — IODIXANOL 320 MG/ML
INJECTION, SOLUTION INTRAVASCULAR
Status: DISCONTINUED | OUTPATIENT
Start: 2021-09-21 | End: 2021-09-21 | Stop reason: HOSPADM

## 2021-09-21 RX ORDER — NITROGLYCERIN 20 MG/100ML
INJECTION INTRAVENOUS
Status: DISCONTINUED | OUTPATIENT
Start: 2021-09-21 | End: 2021-09-21 | Stop reason: HOSPADM

## 2021-09-21 RX ORDER — HYDROCODONE BITARTRATE AND ACETAMINOPHEN 5; 325 MG/1; MG/1
1 TABLET ORAL EVERY 4 HOURS PRN
Status: DISCONTINUED | OUTPATIENT
Start: 2021-09-21 | End: 2021-09-21 | Stop reason: HOSPADM

## 2021-09-21 RX ORDER — MIDAZOLAM HYDROCHLORIDE 1 MG/ML
INJECTION, SOLUTION INTRAMUSCULAR; INTRAVENOUS
Status: DISCONTINUED | OUTPATIENT
Start: 2021-09-21 | End: 2021-09-21 | Stop reason: HOSPADM

## 2021-09-21 RX ORDER — ACETAMINOPHEN 325 MG/1
650 TABLET ORAL EVERY 4 HOURS PRN
Status: DISCONTINUED | OUTPATIENT
Start: 2021-09-21 | End: 2021-09-21 | Stop reason: HOSPADM

## 2021-09-21 RX ORDER — HEPARIN SODIUM 1000 [USP'U]/ML
INJECTION, SOLUTION INTRAVENOUS; SUBCUTANEOUS
Status: DISCONTINUED | OUTPATIENT
Start: 2021-09-21 | End: 2021-09-21 | Stop reason: HOSPADM

## 2021-09-21 RX ORDER — VERAPAMIL HYDROCHLORIDE 2.5 MG/ML
INJECTION, SOLUTION INTRAVENOUS
Status: DISCONTINUED | OUTPATIENT
Start: 2021-09-21 | End: 2021-09-21 | Stop reason: HOSPADM

## 2021-09-21 RX ORDER — LIDOCAINE HYDROCHLORIDE 10 MG/ML
INJECTION, SOLUTION EPIDURAL; INFILTRATION; INTRACAUDAL; PERINEURAL
Status: DISCONTINUED | OUTPATIENT
Start: 2021-09-21 | End: 2021-09-21 | Stop reason: HOSPADM

## 2021-09-21 RX ORDER — FENTANYL CITRATE 50 UG/ML
INJECTION, SOLUTION INTRAMUSCULAR; INTRAVENOUS
Status: DISCONTINUED | OUTPATIENT
Start: 2021-09-21 | End: 2021-09-21 | Stop reason: HOSPADM

## 2021-09-21 RX ORDER — ONDANSETRON 8 MG/1
8 TABLET, ORALLY DISINTEGRATING ORAL EVERY 8 HOURS PRN
Status: DISCONTINUED | OUTPATIENT
Start: 2021-09-21 | End: 2021-09-21 | Stop reason: HOSPADM

## 2021-09-21 RX ORDER — DIPHENHYDRAMINE HCL 25 MG
50 CAPSULE ORAL ONCE
Status: COMPLETED | OUTPATIENT
Start: 2021-09-21 | End: 2021-09-21

## 2021-09-21 RX ORDER — SODIUM CHLORIDE 9 MG/ML
INJECTION, SOLUTION INTRAVENOUS CONTINUOUS
Status: ACTIVE | OUTPATIENT
Start: 2021-09-21 | End: 2021-09-21

## 2021-09-21 RX ORDER — SODIUM CHLORIDE 9 MG/ML
INJECTION, SOLUTION INTRAMUSCULAR; INTRAVENOUS; SUBCUTANEOUS
Status: DISCONTINUED | OUTPATIENT
Start: 2021-09-21 | End: 2021-09-21 | Stop reason: HOSPADM

## 2021-09-21 RX ORDER — MORPHINE SULFATE 10 MG/ML
2 INJECTION INTRAMUSCULAR; INTRAVENOUS; SUBCUTANEOUS EVERY 10 MIN PRN
Status: DISCONTINUED | OUTPATIENT
Start: 2021-09-21 | End: 2021-09-21 | Stop reason: HOSPADM

## 2021-09-21 RX ADMIN — DIPHENHYDRAMINE HYDROCHLORIDE 50 MG: 25 CAPSULE ORAL at 07:09

## 2021-09-21 RX ADMIN — SODIUM CHLORIDE 2000 ML: 0.9 INJECTION, SOLUTION INTRAVENOUS at 11:09

## 2021-09-21 RX ADMIN — SODIUM CHLORIDE: 0.9 INJECTION, SOLUTION INTRAVENOUS at 06:09

## 2021-10-04 DIAGNOSIS — J44.9 COPD, MODERATE: ICD-10-CM

## 2021-10-05 ENCOUNTER — OFFICE VISIT (OUTPATIENT)
Dept: CARDIOLOGY | Facility: CLINIC | Age: 71
End: 2021-10-05
Payer: MEDICARE

## 2021-10-05 VITALS
HEIGHT: 69 IN | DIASTOLIC BLOOD PRESSURE: 64 MMHG | RESPIRATION RATE: 15 BRPM | WEIGHT: 315 LBS | HEART RATE: 87 BPM | SYSTOLIC BLOOD PRESSURE: 130 MMHG | OXYGEN SATURATION: 98 % | BODY MASS INDEX: 46.65 KG/M2

## 2021-10-05 DIAGNOSIS — I77.810 AORTIC ROOT DILATATION: ICD-10-CM

## 2021-10-05 DIAGNOSIS — E78.2 MIXED HYPERLIPIDEMIA: ICD-10-CM

## 2021-10-05 DIAGNOSIS — N18.31 STAGE 3A CHRONIC KIDNEY DISEASE: ICD-10-CM

## 2021-10-05 DIAGNOSIS — R94.39 ABNORMAL NUCLEAR STRESS TEST: ICD-10-CM

## 2021-10-05 DIAGNOSIS — I10 ESSENTIAL HYPERTENSION: ICD-10-CM

## 2021-10-05 DIAGNOSIS — E66.01 MORBID OBESITY WITH BMI OF 45.0-49.9, ADULT: ICD-10-CM

## 2021-10-05 DIAGNOSIS — R06.02 SOBOE (SHORTNESS OF BREATH ON EXERTION): Primary | ICD-10-CM

## 2021-10-05 DIAGNOSIS — I27.20 PULMONARY HYPERTENSION: ICD-10-CM

## 2021-10-05 PROCEDURE — 3044F HG A1C LEVEL LT 7.0%: CPT | Mod: CPTII,S$GLB,, | Performed by: INTERNAL MEDICINE

## 2021-10-05 PROCEDURE — 3008F BODY MASS INDEX DOCD: CPT | Mod: CPTII,S$GLB,, | Performed by: INTERNAL MEDICINE

## 2021-10-05 PROCEDURE — 3075F PR MOST RECENT SYSTOLIC BLOOD PRESS GE 130-139MM HG: ICD-10-PCS | Mod: CPTII,S$GLB,, | Performed by: INTERNAL MEDICINE

## 2021-10-05 PROCEDURE — 1101F PR PT FALLS ASSESS DOC 0-1 FALLS W/OUT INJ PAST YR: ICD-10-PCS | Mod: CPTII,S$GLB,, | Performed by: INTERNAL MEDICINE

## 2021-10-05 PROCEDURE — 3044F PR MOST RECENT HEMOGLOBIN A1C LEVEL <7.0%: ICD-10-PCS | Mod: CPTII,S$GLB,, | Performed by: INTERNAL MEDICINE

## 2021-10-05 PROCEDURE — 1160F PR REVIEW ALL MEDS BY PRESCRIBER/CLIN PHARMACIST DOCUMENTED: ICD-10-PCS | Mod: CPTII,S$GLB,, | Performed by: INTERNAL MEDICINE

## 2021-10-05 PROCEDURE — 1159F PR MEDICATION LIST DOCUMENTED IN MEDICAL RECORD: ICD-10-PCS | Mod: CPTII,S$GLB,, | Performed by: INTERNAL MEDICINE

## 2021-10-05 PROCEDURE — 1126F PR PAIN SEVERITY QUANTIFIED, NO PAIN PRESENT: ICD-10-PCS | Mod: CPTII,S$GLB,, | Performed by: INTERNAL MEDICINE

## 2021-10-05 PROCEDURE — 99999 PR PBB SHADOW E&M-EST. PATIENT-LVL V: ICD-10-PCS | Mod: PBBFAC,,, | Performed by: INTERNAL MEDICINE

## 2021-10-05 PROCEDURE — 99999 PR PBB SHADOW E&M-EST. PATIENT-LVL V: CPT | Mod: PBBFAC,,, | Performed by: INTERNAL MEDICINE

## 2021-10-05 PROCEDURE — 99214 PR OFFICE/OUTPT VISIT, EST, LEVL IV, 30-39 MIN: ICD-10-PCS | Mod: S$GLB,,, | Performed by: INTERNAL MEDICINE

## 2021-10-05 PROCEDURE — 3288F PR FALLS RISK ASSESSMENT DOCUMENTED: ICD-10-PCS | Mod: CPTII,S$GLB,, | Performed by: INTERNAL MEDICINE

## 2021-10-05 PROCEDURE — 3288F FALL RISK ASSESSMENT DOCD: CPT | Mod: CPTII,S$GLB,, | Performed by: INTERNAL MEDICINE

## 2021-10-05 PROCEDURE — 1101F PT FALLS ASSESS-DOCD LE1/YR: CPT | Mod: CPTII,S$GLB,, | Performed by: INTERNAL MEDICINE

## 2021-10-05 PROCEDURE — 1159F MED LIST DOCD IN RCRD: CPT | Mod: CPTII,S$GLB,, | Performed by: INTERNAL MEDICINE

## 2021-10-05 PROCEDURE — 99214 OFFICE O/P EST MOD 30 MIN: CPT | Mod: S$GLB,,, | Performed by: INTERNAL MEDICINE

## 2021-10-05 PROCEDURE — 3008F PR BODY MASS INDEX (BMI) DOCUMENTED: ICD-10-PCS | Mod: CPTII,S$GLB,, | Performed by: INTERNAL MEDICINE

## 2021-10-05 PROCEDURE — 3078F PR MOST RECENT DIASTOLIC BLOOD PRESSURE < 80 MM HG: ICD-10-PCS | Mod: CPTII,S$GLB,, | Performed by: INTERNAL MEDICINE

## 2021-10-05 PROCEDURE — 3075F SYST BP GE 130 - 139MM HG: CPT | Mod: CPTII,S$GLB,, | Performed by: INTERNAL MEDICINE

## 2021-10-05 PROCEDURE — 1126F AMNT PAIN NOTED NONE PRSNT: CPT | Mod: CPTII,S$GLB,, | Performed by: INTERNAL MEDICINE

## 2021-10-05 PROCEDURE — 3078F DIAST BP <80 MM HG: CPT | Mod: CPTII,S$GLB,, | Performed by: INTERNAL MEDICINE

## 2021-10-05 PROCEDURE — 1160F RVW MEDS BY RX/DR IN RCRD: CPT | Mod: CPTII,S$GLB,, | Performed by: INTERNAL MEDICINE

## 2021-10-06 ENCOUNTER — TELEPHONE (OUTPATIENT)
Dept: FAMILY MEDICINE | Facility: CLINIC | Age: 71
End: 2021-10-06

## 2021-10-06 ENCOUNTER — TELEPHONE (OUTPATIENT)
Dept: PULMONOLOGY | Facility: CLINIC | Age: 71
End: 2021-10-06

## 2021-10-06 RX ORDER — FLUTICASONE PROPIONATE AND SALMETEROL 50; 250 UG/1; UG/1
1 POWDER RESPIRATORY (INHALATION) 2 TIMES DAILY
Qty: 60 EACH | Refills: 3 | Status: SHIPPED | OUTPATIENT
Start: 2021-10-06 | End: 2021-11-12 | Stop reason: ALTCHOICE

## 2021-10-18 ENCOUNTER — PATIENT MESSAGE (OUTPATIENT)
Dept: ADMINISTRATIVE | Facility: HOSPITAL | Age: 71
End: 2021-10-18
Payer: MEDICARE

## 2021-11-12 ENCOUNTER — OFFICE VISIT (OUTPATIENT)
Dept: PULMONOLOGY | Facility: CLINIC | Age: 71
End: 2021-11-12
Payer: MEDICARE

## 2021-11-12 VITALS
HEART RATE: 86 BPM | BODY MASS INDEX: 46.65 KG/M2 | HEIGHT: 69 IN | DIASTOLIC BLOOD PRESSURE: 82 MMHG | WEIGHT: 315 LBS | TEMPERATURE: 98 F | OXYGEN SATURATION: 97 % | SYSTOLIC BLOOD PRESSURE: 149 MMHG

## 2021-11-12 DIAGNOSIS — I27.20 PULMONARY HYPERTENSION: ICD-10-CM

## 2021-11-12 DIAGNOSIS — R06.02 SOBOE (SHORTNESS OF BREATH ON EXERTION): Primary | ICD-10-CM

## 2021-11-12 DIAGNOSIS — E66.01 MORBID OBESITY WITH BMI OF 45.0-49.9, ADULT: ICD-10-CM

## 2021-11-12 DIAGNOSIS — G47.33 OBSTRUCTIVE SLEEP APNEA ON CPAP: ICD-10-CM

## 2021-11-12 DIAGNOSIS — R26.81 GAIT INSTABILITY: ICD-10-CM

## 2021-11-12 DIAGNOSIS — J45.40 MODERATE PERSISTENT REACTIVE AIRWAY DISEASE WITHOUT COMPLICATION: ICD-10-CM

## 2021-11-12 DIAGNOSIS — N18.31 CHRONIC KIDNEY DISEASE, STAGE 3A: ICD-10-CM

## 2021-11-12 PROBLEM — J45.909 REACTIVE AIRWAY DISEASE WITHOUT COMPLICATION: Status: ACTIVE | Noted: 2021-11-12

## 2021-11-12 PROCEDURE — 99214 PR OFFICE/OUTPT VISIT, EST, LEVL IV, 30-39 MIN: ICD-10-PCS | Mod: S$GLB,,, | Performed by: NURSE PRACTITIONER

## 2021-11-12 PROCEDURE — 99999 PR PBB SHADOW E&M-EST. PATIENT-LVL V: CPT | Mod: PBBFAC,,, | Performed by: NURSE PRACTITIONER

## 2021-11-12 PROCEDURE — 99214 OFFICE O/P EST MOD 30 MIN: CPT | Mod: S$GLB,,, | Performed by: NURSE PRACTITIONER

## 2021-11-12 PROCEDURE — 99499 RISK ADDL DX/OHS AUDIT: ICD-10-PCS | Mod: S$GLB,,, | Performed by: NURSE PRACTITIONER

## 2021-11-12 PROCEDURE — 99499 UNLISTED E&M SERVICE: CPT | Mod: S$GLB,,, | Performed by: NURSE PRACTITIONER

## 2021-11-12 PROCEDURE — 99999 PR PBB SHADOW E&M-EST. PATIENT-LVL V: ICD-10-PCS | Mod: PBBFAC,,, | Performed by: NURSE PRACTITIONER

## 2021-11-12 RX ORDER — CHLORHEXIDINE GLUCONATE ORAL RINSE 1.2 MG/ML
15 SOLUTION DENTAL 2 TIMES DAILY
COMMUNITY
Start: 2021-10-26 | End: 2023-12-11

## 2021-11-12 RX ORDER — SODIUM FLUORIDE/POTASSIUM NIT 1.1 %-5 %
PASTE (ML) DENTAL
Status: ON HOLD | COMMUNITY
Start: 2021-09-14 | End: 2023-12-18 | Stop reason: CLARIF

## 2021-12-03 ENCOUNTER — TELEPHONE (OUTPATIENT)
Dept: FAMILY MEDICINE | Facility: CLINIC | Age: 71
End: 2021-12-03
Payer: MEDICARE

## 2021-12-07 ENCOUNTER — OFFICE VISIT (OUTPATIENT)
Dept: FAMILY MEDICINE | Facility: CLINIC | Age: 71
End: 2021-12-07
Payer: MEDICARE

## 2021-12-07 VITALS
DIASTOLIC BLOOD PRESSURE: 82 MMHG | TEMPERATURE: 98 F | HEIGHT: 69 IN | OXYGEN SATURATION: 98 % | WEIGHT: 315 LBS | HEART RATE: 77 BPM | BODY MASS INDEX: 46.65 KG/M2 | RESPIRATION RATE: 16 BRPM | SYSTOLIC BLOOD PRESSURE: 130 MMHG

## 2021-12-07 DIAGNOSIS — G47.33 OBSTRUCTIVE SLEEP APNEA ON CPAP: ICD-10-CM

## 2021-12-07 DIAGNOSIS — I10 ESSENTIAL HYPERTENSION: Primary | ICD-10-CM

## 2021-12-07 DIAGNOSIS — M47.22 OSTEOARTHRITIS OF SPINE WITH RADICULOPATHY, CERVICAL REGION: ICD-10-CM

## 2021-12-07 DIAGNOSIS — E78.5 HYPERLIPIDEMIA, ACQUIRED: ICD-10-CM

## 2021-12-07 DIAGNOSIS — R06.02 SOBOE (SHORTNESS OF BREATH ON EXERTION): ICD-10-CM

## 2021-12-07 DIAGNOSIS — G56.23 ULNAR NEUROPATHY OF BOTH UPPER EXTREMITIES: ICD-10-CM

## 2021-12-07 PROBLEM — M13.0 POLYARTHROPATHY OR POLYARTHRITIS OF MULTIPLE SITES: Status: RESOLVED | Noted: 2020-02-27 | Resolved: 2021-12-07

## 2021-12-07 PROCEDURE — 99214 OFFICE O/P EST MOD 30 MIN: CPT | Mod: S$GLB,,, | Performed by: FAMILY MEDICINE

## 2021-12-07 PROCEDURE — 99999 PR PBB SHADOW E&M-EST. PATIENT-LVL IV: ICD-10-PCS | Mod: PBBFAC,,, | Performed by: FAMILY MEDICINE

## 2021-12-07 PROCEDURE — 99214 PR OFFICE/OUTPT VISIT, EST, LEVL IV, 30-39 MIN: ICD-10-PCS | Mod: S$GLB,,, | Performed by: FAMILY MEDICINE

## 2021-12-07 PROCEDURE — 99999 PR PBB SHADOW E&M-EST. PATIENT-LVL IV: CPT | Mod: PBBFAC,,, | Performed by: FAMILY MEDICINE

## 2021-12-07 RX ORDER — GABAPENTIN 600 MG/1
600 TABLET ORAL 3 TIMES DAILY
Qty: 90 TABLET | Refills: 5 | Status: SHIPPED | OUTPATIENT
Start: 2021-12-07 | End: 2021-12-14

## 2021-12-07 RX ORDER — ATORVASTATIN CALCIUM 40 MG/1
40 TABLET, FILM COATED ORAL DAILY
Qty: 90 TABLET | Refills: 1 | Status: SHIPPED | OUTPATIENT
Start: 2021-12-07 | End: 2022-01-18

## 2021-12-07 RX ORDER — HYDROCHLOROTHIAZIDE 25 MG/1
25 TABLET ORAL DAILY
Qty: 90 TABLET | Refills: 1 | Status: SHIPPED | OUTPATIENT
Start: 2021-12-07 | End: 2022-02-21

## 2021-12-13 PROBLEM — R26.81 GAIT INSTABILITY: Status: ACTIVE | Noted: 2020-06-11

## 2021-12-21 ENCOUNTER — TELEPHONE (OUTPATIENT)
Dept: PULMONOLOGY | Facility: CLINIC | Age: 71
End: 2021-12-21
Payer: MEDICARE

## 2022-01-10 ENCOUNTER — PES CALL (OUTPATIENT)
Dept: ADMINISTRATIVE | Facility: CLINIC | Age: 72
End: 2022-01-10
Payer: MEDICARE

## 2022-01-17 DIAGNOSIS — G56.23 ULNAR NEUROPATHY OF BOTH UPPER EXTREMITIES: ICD-10-CM

## 2022-01-17 DIAGNOSIS — M47.22 OSTEOARTHRITIS OF SPINE WITH RADICULOPATHY, CERVICAL REGION: ICD-10-CM

## 2022-01-18 NOTE — TELEPHONE ENCOUNTER
No new care gaps identified.  Powered by [employer]. Reference number: [id number].   1/17/2022 6:15:09 PM CST

## 2022-01-19 RX ORDER — GABAPENTIN 600 MG/1
TABLET ORAL
Qty: 90 TABLET | Refills: 5 | OUTPATIENT
Start: 2022-01-19

## 2022-01-19 NOTE — TELEPHONE ENCOUNTER
----- Message from Марина Ward sent at 1/18/2022  1:41 PM CST -----  Regarding: Refill Request  Who Called: JEAN TRINIDAD JR. [[id number]]        Refill or New Rx: Refill        RX Name and Strength:gabapentin (NEURONTIN) 600 MG tablet        How is the patient currently taking it? (ex. 1XDay): 3xday         Is this a 30 day or 90 day RX: 90        Preferred Pharmacy with phone number:[id number] [place], [place] - [address]        Local or Mail Order: Local        Ordering Provider:Lombard A        Would the patient rather a call back or a response via [facility]? Call back         Best Call Back Number:[phone]        Additional Information

## 2022-01-25 ENCOUNTER — OFFICE VISIT (OUTPATIENT)
Dept: PULMONOLOGY | Facility: CLINIC | Age: 72
End: 2022-01-25
Payer: MEDICARE

## 2022-01-25 VITALS
TEMPERATURE: 98 F | OXYGEN SATURATION: 97 % | DIASTOLIC BLOOD PRESSURE: 83 MMHG | HEIGHT: 69 IN | HEART RATE: 93 BPM | SYSTOLIC BLOOD PRESSURE: 136 MMHG | WEIGHT: 315 LBS | BODY MASS INDEX: 46.65 KG/M2

## 2022-01-25 DIAGNOSIS — D64.9 ANEMIA, UNSPECIFIED TYPE: ICD-10-CM

## 2022-01-25 DIAGNOSIS — J45.40 MODERATE PERSISTENT REACTIVE AIRWAY DISEASE WITHOUT COMPLICATION: ICD-10-CM

## 2022-01-25 DIAGNOSIS — R06.02 SOBOE (SHORTNESS OF BREATH ON EXERTION): Primary | ICD-10-CM

## 2022-01-25 DIAGNOSIS — G47.33 OBSTRUCTIVE SLEEP APNEA ON CPAP: ICD-10-CM

## 2022-01-25 DIAGNOSIS — E66.01 MORBID OBESITY WITH BMI OF 45.0-49.9, ADULT: ICD-10-CM

## 2022-01-25 DIAGNOSIS — N18.31 CHRONIC KIDNEY DISEASE, STAGE 3A: ICD-10-CM

## 2022-01-25 DIAGNOSIS — I27.20 PULMONARY HYPERTENSION: ICD-10-CM

## 2022-01-25 PROCEDURE — 3079F PR MOST RECENT DIASTOLIC BLOOD PRESSURE 80-89 MM HG: ICD-10-PCS | Mod: CPTII,S$GLB,, | Performed by: NURSE PRACTITIONER

## 2022-01-25 PROCEDURE — 3008F PR BODY MASS INDEX (BMI) DOCUMENTED: ICD-10-PCS | Mod: CPTII,S$GLB,, | Performed by: NURSE PRACTITIONER

## 2022-01-25 PROCEDURE — 3079F DIAST BP 80-89 MM HG: CPT | Mod: CPTII,S$GLB,, | Performed by: NURSE PRACTITIONER

## 2022-01-25 PROCEDURE — 3288F FALL RISK ASSESSMENT DOCD: CPT | Mod: CPTII,S$GLB,, | Performed by: NURSE PRACTITIONER

## 2022-01-25 PROCEDURE — 3008F BODY MASS INDEX DOCD: CPT | Mod: CPTII,S$GLB,, | Performed by: NURSE PRACTITIONER

## 2022-01-25 PROCEDURE — 1101F PR PT FALLS ASSESS DOC 0-1 FALLS W/OUT INJ PAST YR: ICD-10-PCS | Mod: CPTII,S$GLB,, | Performed by: NURSE PRACTITIONER

## 2022-01-25 PROCEDURE — 1125F AMNT PAIN NOTED PAIN PRSNT: CPT | Mod: CPTII,S$GLB,, | Performed by: NURSE PRACTITIONER

## 2022-01-25 PROCEDURE — 99999 PR PBB SHADOW E&M-EST. PATIENT-LVL V: ICD-10-PCS | Mod: PBBFAC,,, | Performed by: NURSE PRACTITIONER

## 2022-01-25 PROCEDURE — 1101F PT FALLS ASSESS-DOCD LE1/YR: CPT | Mod: CPTII,S$GLB,, | Performed by: NURSE PRACTITIONER

## 2022-01-25 PROCEDURE — 99214 OFFICE O/P EST MOD 30 MIN: CPT | Mod: S$GLB,,, | Performed by: NURSE PRACTITIONER

## 2022-01-25 PROCEDURE — 3075F PR MOST RECENT SYSTOLIC BLOOD PRESS GE 130-139MM HG: ICD-10-PCS | Mod: CPTII,S$GLB,, | Performed by: NURSE PRACTITIONER

## 2022-01-25 PROCEDURE — 99214 PR OFFICE/OUTPT VISIT, EST, LEVL IV, 30-39 MIN: ICD-10-PCS | Mod: S$GLB,,, | Performed by: NURSE PRACTITIONER

## 2022-01-25 PROCEDURE — 99999 PR PBB SHADOW E&M-EST. PATIENT-LVL V: CPT | Mod: PBBFAC,,, | Performed by: NURSE PRACTITIONER

## 2022-01-25 PROCEDURE — 1125F PR PAIN SEVERITY QUANTIFIED, PAIN PRESENT: ICD-10-PCS | Mod: CPTII,S$GLB,, | Performed by: NURSE PRACTITIONER

## 2022-01-25 PROCEDURE — 1159F PR MEDICATION LIST DOCUMENTED IN MEDICAL RECORD: ICD-10-PCS | Mod: CPTII,S$GLB,, | Performed by: NURSE PRACTITIONER

## 2022-01-25 PROCEDURE — 3075F SYST BP GE 130 - 139MM HG: CPT | Mod: CPTII,S$GLB,, | Performed by: NURSE PRACTITIONER

## 2022-01-25 PROCEDURE — 1159F MED LIST DOCD IN RCRD: CPT | Mod: CPTII,S$GLB,, | Performed by: NURSE PRACTITIONER

## 2022-01-25 PROCEDURE — 3288F PR FALLS RISK ASSESSMENT DOCUMENTED: ICD-10-PCS | Mod: CPTII,S$GLB,, | Performed by: NURSE PRACTITIONER

## 2022-01-25 RX ORDER — BUDESONIDE AND FORMOTEROL FUMARATE DIHYDRATE 160; 4.5 UG/1; UG/1
2 AEROSOL RESPIRATORY (INHALATION) EVERY 12 HOURS
Qty: 10.2 G | Refills: 11 | Status: SHIPPED | OUTPATIENT
Start: 2022-01-25 | End: 2022-07-08

## 2022-01-25 NOTE — PROGRESS NOTES
"  CHIEF COMPLAINT:    Chief Complaint   Patient presents with    COPD       HISTORY OF PRESENT ILLNESS: Bj Pate Jr. is a 72 y.o. male new patient  nonsmoker with  Obesity, HTN, DM, HLD, hx CVA, no hx asthma but reports dx copd 10 years ago following pft, HILARIO on cpap since 2004     is here for follow up.    LOV 6/4/2021: Patient with symptoms of SOBOE for a "long period of time". Worsen following hand sx last Feb and contracted sepsis and became wheelchair bound x couple months. Completed PT/OT who has him resting when SOB concern stressing his heart.  Pt was walked in clinic with heavy dyspnea HR increased to 121 but oxygen maintained at 93%. Pt unables to walk for longer than 1.5 min    Interval hx: Undergoing Rehab for cts  Cant tell if inhalers help, breathing worst   No improvement noted any inhalers- has tried advair and trelegy    PFT 6/16/2021: Spirometry shows a reduced vital capacity suggesting restriction. Spirometry shows borderline improvement following bronchodilator. Lung volumes show moderate restriction is present. DLCO is normal.   CXR lungs clear  Perfusion scan normal  Echo 6/18/2021: The left ventricle is normal in size with concentric remodeling and normal systolic function.  · The estimated ejection fraction is 65%.  · Normal right ventricular size with normal right ventricular systolic function.  · The sinuses of Valsalva is mildly dilated, 3.8cm.  · The estimated PA systolic pressure is 41 mmHg.  · There is pulmonary hypertension.  PSG 2004 RDI 98.6  Presents withDreamstation cpap 8 cflex 2 av usage 7 hours, AHI 11.9 MF 68%, PB 0%    PAST MEDICAL HISTORY:    Active Ambulatory Problems     Diagnosis Date Noted    Obstructive sleep apnea on CPAP: setting = 8     Morbid obesity with BMI of 45.0-49.9, adult     Chronic idiopathic gout of multiple sites     Chronic kidney disease, stage 3a     History of CVA (cerebrovascular accident) 03/14/2016    History of cerebral parenchymal " hemorrhage 05/26/2016    Essential hypertension 01/27/2017    Decreased spinal mobility 02/08/2019    Decreased functional mobility and endurance 02/08/2019    Cervical radiculopathy 05/02/2019    Lumbar spondylosis 05/14/2019    Osteoarthritis of cervical spine 05/14/2019    DDD (degenerative disc disease), lumbar 05/14/2019    DDD (degenerative disc disease), cervical 05/14/2019    Osteoarthritis of spine with radiculopathy, cervical region 07/08/2019    Carpal tunnel syndrome on both sides 11/04/2019    Ulnar neuropathy of both upper extremities 11/04/2019    Left lateral epicondylitis 02/04/2020    Left carpal tunnel syndrome 02/19/2020    Generalized weakness 02/26/2020    Acute kidney injury superimposed on chronic kidney disease 02/27/2020    Transaminitis 02/27/2020    Urinary retention 02/27/2020    Chronic gout of left wrist 02/28/2020    Polyarticular gout 02/28/2020    Acute idiopathic gout of multiple sites     Urinary tract infection with hematuria 03/06/2020    Hyperkalemia 03/06/2020    Hyponatremia 03/06/2020    Leukocytosis 03/06/2020    Pseudomonas infection     Decreased strength, endurance, and mobility 06/11/2020    Gait instability 06/11/2020    Balance problem 06/11/2020    Decreased ROM of left shoulder 07/27/2020    Decreased ROM of right shoulder 07/27/2020    Alteration in instrumental activities of daily living (IADL) 07/27/2020    Decreased  strength 07/27/2020    SOBOE (shortness of breath on exertion) 06/04/2021    Anemia 06/04/2021    Pulmonary hypertension 07/13/2021    Mixed hyperlipidemia 07/13/2021    Abnormal nuclear stress test 09/21/2021    Reactive airway disease without complication 11/12/2021    Hyperlipidemia, acquired 12/07/2021     Resolved Ambulatory Problems     Diagnosis Date Noted    Type 2 DM with CKD stage 3 and hypertension     Stroke 01/30/2016    Acute ischemic stroke 01/30/2016    Benign hypertension with CKD  (chronic kidney disease) stage III 02/02/2016    Borderline diabetes 02/02/2016    Dysarthria due to recent stroke 02/19/2016    Thalamic infarct, acute (right) 02/19/2016    Abnormality of gait following cerebrovascular accident (CVA) 02/19/2016    Loss of coordination 02/23/2016    Shoulder pain 02/23/2016    Impaired mobility and ADLs 02/23/2016    Dysarthria due to recent stroke 02/29/2016    Abnormality of gait following cerebrovascular accident 03/09/2016    Abnormality of gait following cerebrovascular accident (CVA) 03/14/2016    Fine motor impairment 03/16/2016    Impaired mobility and ADLs 03/30/2016    Diet-controlled diabetes mellitus 03/07/2017    Diabetic polyneuropathy associated with type 2 diabetes mellitus 01/30/2018    Polyarthropathy or polyarthritis of multiple sites 02/27/2020    Non-traumatic rhabdomyolysis 02/27/2020    Bacteremia     Type 2 diabetes mellitus with hyperglycemia, with long-term current use of insulin 03/06/2020    Pressure injury of buttock, unstageable 03/06/2020     Past Medical History:   Diagnosis Date    Anticoagulant long-term use     Basal ganglia hemorrhage     Benign hypertension with CKD (chronic kidney disease) stage III      Cataract     Chronic idiopathic gout of multiple sites     Chronic kidney disease, stage 3     COPD (chronic obstructive pulmonary disease)     Erectile dysfunction     Gout     Hemorrhoids without complication     Hyperlipidemia     Morbid obesity     Type 2 DM with CKD stage 3 and hypertension                 PAST SURGICAL HISTORY:    Past Surgical History:   Procedure Laterality Date    CARPAL TUNNEL RELEASE Left 2/19/2020    Procedure: RELEASE, CARPAL TUNNEL LEFT;  Surgeon: Maria Luisa Mccurdy MD;  Location: [facility];  Service: Orthopedics;  Laterality: Left;    EPIDURAL STEROID INJECTION N/A 5/2/2019    Procedure: Injection, Steroid, Epidural Cervical;  Surgeon: Dariel Doan Jr., MD;  Location:  [facility] ENDO;  Service: Pain Management;  Laterality: N/A;  Cervical Epidural Steroid Injection     05066    Arrive @ 1130; ASA; Check BG    EPIDURAL STEROID INJECTION Bilateral 5/29/2019    Procedure: Lumbar Medial Branch Blocks;  Surgeon: Dariel Doan Jr., MD;  Location: [facility] ENDO;  Service: Pain Management;  Laterality: Bilateral;  Bilateral Lumbar Medial Branch Blocks L3, L4, L5    [id number]  [id number]    Attive @ 1030 (11 arrival request); NO Sedation; ASA; Check BG    EPIDURAL STEROID INJECTION Bilateral 7/3/2019    Procedure: Lumbar Medial Branch Blocks;  Surgeon: Dariel Doan Jr., MD;  Location: [facility] ENDO;  Service: Pain Management;  Laterality: Bilateral;  Bilateral Lumbar Medial Branch Blocks L3, L4, L5    [id number]  [id number]    Arrive @ 0930; NO Sedation; ASA; Check BG    EPIDURAL STEROID INJECTION N/A 8/7/2019    Procedure: Injection, Steroid, Epidural Cervical;  Surgeon: Dariel Doan Jr., MD;  Location: [facility] ENDO;  Service: Pain Management;  Laterality: N/A;  Cervical Epidural Steroid Injection C7-T1    48710    Arrive @ 1115; Last ASA 7/30; Check BG    LEFT HEART CATHETERIZATION Left 9/21/2021    Procedure: Left heart cath 9am start, R rad access;  Surgeon: Dariel Conner MD;  Location: [facility] CATH LAB;  Service: Cardiology;  Laterality: Left;  RN PRE OP Covid NEGATIVE ON  9-20-21.  C A    NO PAST SURGERIES           FAMILY HISTORY:                Family History   Problem Relation Age of Onset    No Known Problems Mother     No Known Problems Father     Hypertension Unknown     Diabetes Unknown     Diabetes Paternal Grandfather     Heart disease Paternal Grandfather     No Known Problems Sister     No Known Problems Brother     No Known Problems Maternal Aunt     No Known Problems Maternal Uncle     No Known Problems Paternal Aunt     No Known Problems Paternal Uncle     No Known Problems Maternal Grandmother     No Known Problems Maternal Grandfather     No Known Problems  Paternal Grandmother     Amblyopia Neg Hx     Blindness Neg Hx     Cancer Neg Hx     Cataracts Neg Hx     Glaucoma Neg Hx     Macular degeneration Neg Hx     Retinal detachment Neg Hx     Strabismus Neg Hx     Stroke Neg Hx     Thyroid disease Neg Hx        SOCIAL HISTORY:          Tobacco:   Social History     Tobacco Use   Smoking Status Never Smoker   Smokeless Tobacco Never Used       alcohol use:    Social History     Substance and Sexual Activity   Alcohol Use Yes    Alcohol/week: 0.0 standard drinks    Comment: occacionally               Occupation: Disabled     ALLERGIES:    Review of patient's allergies indicates:   Allergen Reactions    Tomato (solanum lycopersicum) Hives    Naproxen Hives    Shrimp Other (See Comments)       CURRENT MEDICATIONS:    Current Outpatient Medications   Medication Sig Dispense Refill    acetaminophen-codeine 300-30mg (TYLENOL #3) 300-30 mg Tab TK 1 T PO Q 4 TO 6 H PRN P      albuterol (PROVENTIL/VENTOLIN HFA) 90 mcg/actuation inhaler INHALE 2 PUFFS INTO THE LUNGS EVERY 6 HOURS AS NEEDED FOR WHEEZING. RESCUE 8.5 g 5    ascorbic acid, vitamin C, (VITAMIN C) 500 MG tablet Take 500 mg by mouth once daily.      aspirin (ECOTRIN) 81 MG EC tablet Take 81 mg by mouth once daily.      atorvastatin (LIPITOR) 40 MG tablet TAKE 1 TABLET(40 MG) BY MOUTH EVERY DAY 90 tablet 1    blood pressure test kit-large Kit 1 Device by Misc.(Non-Drug; Combo Route) route 2 (two) times daily. 1 each 0    blood sugar diagnostic Strp To check BG 2 times daily, to use with insurance preferred meter 200 strip 3    chlorhexidine (PERIDEX) 0.12 % solution Use as directed 15 mLs in the mouth or throat 2 (two) times daily.      colchicine (MITIGARE) 0.6 mg Cap Take 1 capsule (0.6 mg total) by mouth once daily. 90 capsule 3    diltiaZEM (CARDIZEM CD) 240 MG 24 hr capsule TAKE 1 CAPSULE(240 MG) BY MOUTH EVERY DAY 90 capsule 1    gabapentin (NEURONTIN) 600 MG tablet TAKE 1  TABLET(600 MG) BY MOUTH THREE TIMES DAILY 90 tablet 5    hydroCHLOROthiazide (HYDRODIURIL) 25 MG tablet Take 1 tablet (25 mg total) by mouth once daily. 90 tablet 1    lancets Misc To check BG 2 times daily, to use with insurance preferred meter 200 each 3    PREVIDENT 5000 SENSITIVE 1.1-5 % Pste use as directed      tamsulosin (FLOMAX) 0.4 mg Cap TAKE 1 CAPSULE(0.4 MG) BY MOUTH EVERY DAY 90 capsule 3    tiZANidine (ZANAFLEX) 4 MG tablet Take 1-2 tabs PO nightly as needed for Back Pain 60 tablet 2    traMADoL (ULTRAM) 50 mg tablet Take 1 tablet (50 mg total) by mouth every 12 (twelve) hours as needed for Pain. 60 tablet 1    vitamin D (VITAMIN D3) 1000 units Tab Take 1,000 Units by mouth once daily.      blood-glucose meter kit To check BG 2 times daily, to use with insurance preferred meter 1 each 0    budesonide-formoterol 160-4.5 mcg (SYMBICORT) 160-4.5 mcg/actuation HFAA Inhale 2 puffs into the lungs every 12 (twelve) hours. Controller 10.2 g 11    inhalation spacing device Use as directed for inhalation. 1 each 0     No current facility-administered medications for this visit.                  REVIEW OF SYSTEMS:   Review of Systems   Constitutional: Negative for fever, chills, weight loss, weight gain, activity change, appetite change, fatigue and night sweats.   HENT: Negative for postnasal drip, trouble swallowing, voice change and congestion.    Eyes: Negative.    Respiratory: Positive for cough, wheezing, dyspnea on extertion and use of rescue inhaler (albuterol use about 1-2x a day, cant say if helps). Negative for sputum production, chest tightness, orthopnea and previous hospitialization due to pulmonary problems. Somnolence: front door few feet.         Advair x years       Cardiovascular: Positive for palpitations (with movement). Negative for chest pain and leg swelling.   Genitourinary: Negative.    Endocrine: endocrine negative   Musculoskeletal: Positive for gait problem.        Walks  "with cane, generalized weakness and sob     Skin: Negative.    Gastrointestinal: Negative for acid reflux.   Psychiatric/Behavioral: Positive for sleep disturbance (most of time sleep through night).          PHYSICAL EXAM:  Vitals:    01/25/22 1354   BP: 136/83   Pulse: 93   Temp: 98.1 °F (36.7 °C)   SpO2: 97%   Weight: (!) 148.7 kg (327 lb 13.2 oz)   Height: 5' 9" (1.753 m)   PainSc:   4     Body mass index is 48.41 kg/m².   Physical Exam   Constitutional: He is oriented to person, place, and time. He appears well-developed. He is obese.   HENT:   Head: Normocephalic.   Cardiovascular: Normal rate, regular rhythm and normal heart sounds.   Pulmonary/Chest: Normal expansion, effort normal and breath sounds normal.   Musculoskeletal:         General: Edema (2-3+) present.      Cervical back: Neck supple.   Neurological: He is alert and oriented to person, place, and time.   Walks with cane   Skin: Skin is warm.   Psychiatric: He has a normal mood and affect. His behavior is normal. Judgment and thought content normal.                                          DATA :    PFT:Spirometry shows a reduced vital capacity suggesting restriction. Spirometry shows borderline improvement following bronchodilator. Lung volumes show moderate restriction is present. DLCO is normal    CXR:bibasilar atelectasis    CT chest:NA    Sleep study:2004 AHI 98    ECHO 2016 normal      Lab Results   Component Value Date    TSH 0.445 02/27/2020      Lab Results   Component Value Date    WBC 6.57 09/20/2021    HGB 12.5 (L) 09/20/2021    HCT 38.5 (L) 09/20/2021    MCV 88 09/20/2021     09/20/2021     BMP  Lab Results   Component Value Date     09/20/2021    K 3.9 09/20/2021     09/20/2021    CO2 28 09/20/2021    BUN 15 09/20/2021    CREATININE 1.6 (H) 09/20/2021    CALCIUM 9.4 09/20/2021    ANIONGAP 9 09/20/2021    ESTGFRAFRICA 50 (A) 09/20/2021    EGFRNONAA 43 (A) 09/20/2021     Lab Results   Component Value Date    HGBA1C " 5.8 (H) 01/26/2021      Cant sleep without cpap  ASSESSMENT    ICD-10-CM ICD-9-CM    1. SOBOE (shortness of breath on exertion)  R06.02 786.05    2. Pulmonary hypertension  I27.20 416.8    3. Morbid obesity with BMI of 45.0-49.9, adult  E66.01 278.01 Ambulatory referral/consult to Bariatric Medicine    Z68.42 V85.42 Ambulatory referral/consult to Nutrition Services   4. Obstructive sleep apnea on CPAP: setting = 8  G47.33 327.23     Z99.89 V46.8    5. Moderate persistent reactive airway disease without complication  J45.40 493.90 budesonide-formoterol 160-4.5 mcg (SYMBICORT) 160-4.5 mcg/actuation HFAA      CANCELED: AEROCHAMBER PLUS FLOW-VU FOR HOME USE   6. Anemia, unspecified type  D64.9 285.9 Ferritin      IRON AND TIBC   7. Chronic kidney disease, stage 3a  N18.31 585.3 Ambulatory referral/consult to Nutrition Services       PLAN:    Problem List Items Addressed This Visit        Unprioritized    Anemia    Overview     Likely due to ckd, chronic disease, recommend checking iron deficiency         Relevant Orders    Ferritin    IRON AND TIBC    Chronic kidney disease, stage 3a    Current Assessment & Plan     May benefit from nutrition guidance         Relevant Orders    Ambulatory referral/consult to Nutrition Services    Morbid obesity with BMI of 45.0-49.9, adult    Overview     Patient is aware of need for weight loss  and has been asked to make an effort to improve diet         Relevant Orders    Ambulatory referral/consult to Bariatric Medicine    Ambulatory referral/consult to Nutrition Services    Obstructive sleep apnea on CPAP: setting = 8    Overview     Using and benefiting from cpap  Notified of recall. Instructed register device(s) on the recall website www.[employer]/src-updates or call [id number]  Severe sleep apnea RDI 98.6 , pulmonary HTN, history stroke         Pulmonary hypertension    Overview     Cardiopulmonary, also follow by  cardiology         Reactive airway disease without  complication    Overview     No response  Ics/laba/lama ellipta device, will trial MDI with aerochamber due to restrictive pulmonary pattern         Relevant Medications    budesonide-formoterol 160-4.5 mcg (SYMBICORT) 160-4.5 mcg/actuation HFAA    SOBOE (shortness of breath on exertion) - Primary    Overview     Multifactorial-obesity, deconditioning,OHS, pain, cardiopulmonary dz                 Patient will Follow up in about 3 months (around 4/25/2022), or if symptoms worsen or fail to improve.

## 2022-02-08 ENCOUNTER — TELEPHONE (OUTPATIENT)
Dept: BARIATRICS | Facility: CLINIC | Age: 72
End: 2022-02-08
Payer: MEDICARE

## 2022-02-21 DIAGNOSIS — N40.0 BENIGN PROSTATIC HYPERPLASIA WITHOUT LOWER URINARY TRACT SYMPTOMS: ICD-10-CM

## 2022-02-21 RX ORDER — TAMSULOSIN HYDROCHLORIDE 0.4 MG/1
CAPSULE ORAL
Qty: 90 CAPSULE | Refills: 3 | Status: SHIPPED | OUTPATIENT
Start: 2022-02-21 | End: 2023-03-16 | Stop reason: SDUPTHER

## 2022-02-21 NOTE — TELEPHONE ENCOUNTER
Care Due:                  Date            Visit Type   Department     Provider  --------------------------------------------------------------------------------                                EP -                              PRIMARY      [facility]  Last Visit: 12-      CARE ([facility])   MEDICINE       Azikiwe K Lombard  Next Visit: None Scheduled  None         None Found                                                            Last  Test          Frequency    Reason                     Performed    Due Date  --------------------------------------------------------------------------------    Uric Acid...  12 months..  colchicine...............  02- 02-    Powered by [employer]. Reference number: [id number].   2/21/2022 8:09:44 AM CST

## 2022-02-21 NOTE — TELEPHONE ENCOUNTER
Provider Staff:     Action is required for this patient.   Please see care gap opportunities below in Care Due Message.     Thanks!  [facility]     Appointments      Date Provider   Last Visit   12/7/2021 Azikiwe K. Lombard, MD   Next Visit   Visit date not found Azikiwe K. Lombard, MD     Note composed:8:43 AM 02/21/2022

## 2022-02-21 NOTE — TELEPHONE ENCOUNTER
Refill Authorization Note   Bj Pate  is requesting a refill authorization.  Brief Assessment and Rationale for Refill:  Approve    -Medication-Related Problems Identified: Requires labs  Medication Therapy Plan:       Medication Reconciliation Completed: No   Comments:   --->Care Gap information included below if applicable.       Requested Prescriptions   Pending Prescriptions Disp Refills    tamsulosin (FLOMAX) 0.4 mg Cap [Pharmacy Med Name: TAMSULOSIN 0.4MG CAPSULES] 90 capsule 3     Sig: TAKE 1 CAPSULE(0.4 MG) BY MOUTH EVERY DAY       Urology: Alpha-Adrenergic Blocker Passed - 2/21/2022  8:09 AM        Passed - Patient is at least 18 years old        Passed - Prostate Cancer is not on problem list        Passed - BP > 90/50 mmH     BP Readings from Last 1 Encounters:   01/25/22 136/83               Passed - Valid encounter within last 15 months     Recent Visits  Date Type Provider Dept   12/07/21 Office Visit Azikiwe K. Lombard, MD [facility]   07/07/21 Office Visit Azikiwe K. Lombard, MD [facility]   11/19/20 Office Visit Azikiwe K. Lombard, MD [facility]   07/30/20 Office Visit Azikiwe K. Lombard, MD [facility]   05/26/20 Office Visit Azikiwe K. Lombard, MD [facility]   Showing recent visits within past 720 days and meeting all other requirements  Future Appointments  No visits were found meeting these conditions.  Showing future appointments within next 150 days and meeting all other requirements                    Appointments  past 12m or future 3m with PCP    Date Provider   Last Visit   12/7/2021 Azikiwe K. Lombard, MD   Next Visit   Visit date not found Azikiwe K. Lombard, MD   ED visits in past 90 days: 0     Note composed:8:40 AM 02/21/2022

## 2022-02-25 ENCOUNTER — PES CALL (OUTPATIENT)
Dept: ADMINISTRATIVE | Facility: CLINIC | Age: 72
End: 2022-02-25
Payer: MEDICARE

## 2022-03-02 ENCOUNTER — TELEPHONE (OUTPATIENT)
Dept: BARIATRICS | Facility: CLINIC | Age: 72
End: 2022-03-02
Payer: MEDICARE

## 2022-03-02 NOTE — TELEPHONE ENCOUNTER
"----- Message from Tari Colmenares sent at 10/1/2020 11:29 AM CDT -----  Regarding: echo "[facility] [phone]  .Type: Patient Call Back    Who called: echo "[facility]    What is the request in detail: Requesting order for diabetic lancets. Please fax order to [phone]    Can the clinic reply by [facility]? Call back     Would the patient rather a call back or a response via [facility]?  Call back     Best call back number: [phone]            " show

## 2022-03-10 ENCOUNTER — TELEPHONE (OUTPATIENT)
Dept: ADMINISTRATIVE | Facility: CLINIC | Age: 72
End: 2022-03-10
Payer: MEDICARE

## 2022-03-11 ENCOUNTER — OFFICE VISIT (OUTPATIENT)
Dept: FAMILY MEDICINE | Facility: CLINIC | Age: 72
End: 2022-03-11
Payer: MEDICARE

## 2022-03-11 ENCOUNTER — LAB VISIT (OUTPATIENT)
Dept: LAB | Facility: HOSPITAL | Age: 72
End: 2022-03-11
Attending: PHYSICIAN ASSISTANT
Payer: MEDICARE

## 2022-03-11 VITALS
DIASTOLIC BLOOD PRESSURE: 72 MMHG | HEART RATE: 88 BPM | OXYGEN SATURATION: 98 % | RESPIRATION RATE: 18 BRPM | BODY MASS INDEX: 46.65 KG/M2 | WEIGHT: 315 LBS | SYSTOLIC BLOOD PRESSURE: 100 MMHG | HEIGHT: 69 IN | TEMPERATURE: 98 F

## 2022-03-11 DIAGNOSIS — I10 ESSENTIAL HYPERTENSION: ICD-10-CM

## 2022-03-11 DIAGNOSIS — Z86.73 HISTORY OF CVA (CEREBROVASCULAR ACCIDENT): ICD-10-CM

## 2022-03-11 DIAGNOSIS — R73.03 PRE-DIABETES: ICD-10-CM

## 2022-03-11 DIAGNOSIS — I77.810 AORTIC ROOT DILATATION: ICD-10-CM

## 2022-03-11 DIAGNOSIS — N18.31 CHRONIC KIDNEY DISEASE, STAGE 3A: ICD-10-CM

## 2022-03-11 DIAGNOSIS — Z99.89 DEPENDENCE ON OTHER ENABLING MACHINES AND DEVICES: ICD-10-CM

## 2022-03-11 DIAGNOSIS — Z74.09 OTHER REDUCED MOBILITY: ICD-10-CM

## 2022-03-11 DIAGNOSIS — Z00.00 ENCOUNTER FOR PREVENTIVE HEALTH EXAMINATION: Primary | ICD-10-CM

## 2022-03-11 DIAGNOSIS — E66.01 MORBID OBESITY WITH BMI OF 45.0-49.9, ADULT: ICD-10-CM

## 2022-03-11 LAB
ANION GAP SERPL CALC-SCNC: 11 MMOL/L (ref 8–16)
BUN SERPL-MCNC: 19 MG/DL (ref 8–23)
CALCIUM SERPL-MCNC: 9.8 MG/DL (ref 8.7–10.5)
CHLORIDE SERPL-SCNC: 96 MMOL/L (ref 95–110)
CHOLEST SERPL-MCNC: 116 MG/DL (ref 120–199)
CHOLEST/HDLC SERPL: 2.3 {RATIO} (ref 2–5)
CO2 SERPL-SCNC: 31 MMOL/L (ref 23–29)
CREAT SERPL-MCNC: 1.7 MG/DL (ref 0.5–1.4)
EST. GFR  (AFRICAN AMERICAN): 46.2 ML/MIN/1.73 M^2
EST. GFR  (NON AFRICAN AMERICAN): 40 ML/MIN/1.73 M^2
ESTIMATED AVG GLUCOSE: 134 MG/DL (ref 68–131)
GLUCOSE SERPL-MCNC: 124 MG/DL (ref 70–110)
HBA1C MFR BLD: 6.3 % (ref 4–5.6)
HDLC SERPL-MCNC: 51 MG/DL (ref 40–75)
HDLC SERPL: 44 % (ref 20–50)
LDLC SERPL CALC-MCNC: 46.8 MG/DL (ref 63–159)
NONHDLC SERPL-MCNC: 65 MG/DL
POTASSIUM SERPL-SCNC: 4 MMOL/L (ref 3.5–5.1)
SODIUM SERPL-SCNC: 138 MMOL/L (ref 136–145)
TRIGL SERPL-MCNC: 91 MG/DL (ref 30–150)

## 2022-03-11 PROCEDURE — G0439 PR MEDICARE ANNUAL WELLNESS SUBSEQUENT VISIT: ICD-10-PCS | Mod: S$GLB,,, | Performed by: PHYSICIAN ASSISTANT

## 2022-03-11 PROCEDURE — 3078F PR MOST RECENT DIASTOLIC BLOOD PRESSURE < 80 MM HG: ICD-10-PCS | Mod: CPTII,S$GLB,, | Performed by: PHYSICIAN ASSISTANT

## 2022-03-11 PROCEDURE — 3044F PR MOST RECENT HEMOGLOBIN A1C LEVEL <7.0%: ICD-10-PCS | Mod: CPTII,S$GLB,, | Performed by: PHYSICIAN ASSISTANT

## 2022-03-11 PROCEDURE — 1101F PT FALLS ASSESS-DOCD LE1/YR: CPT | Mod: CPTII,S$GLB,, | Performed by: PHYSICIAN ASSISTANT

## 2022-03-11 PROCEDURE — 3288F PR FALLS RISK ASSESSMENT DOCUMENTED: ICD-10-PCS | Mod: CPTII,S$GLB,, | Performed by: PHYSICIAN ASSISTANT

## 2022-03-11 PROCEDURE — 1101F PR PT FALLS ASSESS DOC 0-1 FALLS W/OUT INJ PAST YR: ICD-10-PCS | Mod: CPTII,S$GLB,, | Performed by: PHYSICIAN ASSISTANT

## 2022-03-11 PROCEDURE — 3074F SYST BP LT 130 MM HG: CPT | Mod: CPTII,S$GLB,, | Performed by: PHYSICIAN ASSISTANT

## 2022-03-11 PROCEDURE — 3288F FALL RISK ASSESSMENT DOCD: CPT | Mod: CPTII,S$GLB,, | Performed by: PHYSICIAN ASSISTANT

## 2022-03-11 PROCEDURE — 3008F PR BODY MASS INDEX (BMI) DOCUMENTED: ICD-10-PCS | Mod: CPTII,S$GLB,, | Performed by: PHYSICIAN ASSISTANT

## 2022-03-11 PROCEDURE — 99999 PR PBB SHADOW E&M-EST. PATIENT-LVL V: CPT | Mod: PBBFAC,,, | Performed by: PHYSICIAN ASSISTANT

## 2022-03-11 PROCEDURE — 1125F PR PAIN SEVERITY QUANTIFIED, PAIN PRESENT: ICD-10-PCS | Mod: CPTII,S$GLB,, | Performed by: PHYSICIAN ASSISTANT

## 2022-03-11 PROCEDURE — 99499 RISK ADDL DX/OHS AUDIT: ICD-10-PCS | Mod: S$GLB,,, | Performed by: PHYSICIAN ASSISTANT

## 2022-03-11 PROCEDURE — 99999 PR PBB SHADOW E&M-EST. PATIENT-LVL V: ICD-10-PCS | Mod: PBBFAC,,, | Performed by: PHYSICIAN ASSISTANT

## 2022-03-11 PROCEDURE — 1159F PR MEDICATION LIST DOCUMENTED IN MEDICAL RECORD: ICD-10-PCS | Mod: CPTII,S$GLB,, | Performed by: PHYSICIAN ASSISTANT

## 2022-03-11 PROCEDURE — 1160F RVW MEDS BY RX/DR IN RCRD: CPT | Mod: CPTII,S$GLB,, | Performed by: PHYSICIAN ASSISTANT

## 2022-03-11 PROCEDURE — 1125F AMNT PAIN NOTED PAIN PRSNT: CPT | Mod: CPTII,S$GLB,, | Performed by: PHYSICIAN ASSISTANT

## 2022-03-11 PROCEDURE — 80061 LIPID PANEL: CPT | Performed by: PHYSICIAN ASSISTANT

## 2022-03-11 PROCEDURE — 3074F PR MOST RECENT SYSTOLIC BLOOD PRESSURE < 130 MM HG: ICD-10-PCS | Mod: CPTII,S$GLB,, | Performed by: PHYSICIAN ASSISTANT

## 2022-03-11 PROCEDURE — G0439 PPPS, SUBSEQ VISIT: HCPCS | Mod: S$GLB,,, | Performed by: PHYSICIAN ASSISTANT

## 2022-03-11 PROCEDURE — 3008F BODY MASS INDEX DOCD: CPT | Mod: CPTII,S$GLB,, | Performed by: PHYSICIAN ASSISTANT

## 2022-03-11 PROCEDURE — 36415 COLL VENOUS BLD VENIPUNCTURE: CPT | Mod: PO | Performed by: PHYSICIAN ASSISTANT

## 2022-03-11 PROCEDURE — 3078F DIAST BP <80 MM HG: CPT | Mod: CPTII,S$GLB,, | Performed by: PHYSICIAN ASSISTANT

## 2022-03-11 PROCEDURE — 1159F MED LIST DOCD IN RCRD: CPT | Mod: CPTII,S$GLB,, | Performed by: PHYSICIAN ASSISTANT

## 2022-03-11 PROCEDURE — 83036 HEMOGLOBIN GLYCOSYLATED A1C: CPT | Performed by: PHYSICIAN ASSISTANT

## 2022-03-11 PROCEDURE — 1160F PR REVIEW ALL MEDS BY PRESCRIBER/CLIN PHARMACIST DOCUMENTED: ICD-10-PCS | Mod: CPTII,S$GLB,, | Performed by: PHYSICIAN ASSISTANT

## 2022-03-11 PROCEDURE — 99499 UNLISTED E&M SERVICE: CPT | Mod: S$GLB,,, | Performed by: PHYSICIAN ASSISTANT

## 2022-03-11 PROCEDURE — 80048 BASIC METABOLIC PNL TOTAL CA: CPT | Performed by: PHYSICIAN ASSISTANT

## 2022-03-11 PROCEDURE — 3044F HG A1C LEVEL LT 7.0%: CPT | Mod: CPTII,S$GLB,, | Performed by: PHYSICIAN ASSISTANT

## 2022-03-11 RX ORDER — FLUTICASONE FUROATE, UMECLIDINIUM BROMIDE AND VILANTEROL TRIFENATATE 200; 62.5; 25 UG/1; UG/1; UG/1
POWDER RESPIRATORY (INHALATION)
COMMUNITY
Start: 2022-02-23 | End: 2022-07-27

## 2022-03-11 NOTE — PATIENT INSTRUCTIONS
Counseling and Referral of Other Preventative  (Italic type indicates deductible and co-insurance are waived)    Patient Name: Bj Pate  Today's Date: 3/11/2022    Health Maintenance       Date Due Completion Date    Shingles Vaccine (1 of 2) Never done ---    COVID-19 Vaccine (2 - Booster for Apurva series) 05/03/2021 3/8/2021    Hemoglobin A1c 07/26/2021 1/26/2021    Override on 4/8/2016: Done (future)    Override on 1/4/2016: Done (future)    Override on 8/22/2014: Done    Colorectal Cancer Screening 07/22/2022 7/22/2021    Override on 2/21/2005: Done    High Dose Statin 01/25/2023 1/25/2022    Lipid Panel 01/26/2026 1/26/2021    Override on 8/22/2014: Done    TETANUS VACCINE 02/19/2026 2/19/2016        Orders Placed This Encounter   Procedures    Hemoglobin A1c     The following information is provided to all patients.  This information is to help you find resources for any of the problems found today that may be affecting your health:                Living healthy guide: www.[facility].[place].gov      Understanding Diabetes: www.diabetes.org      Eating healthy: www.cdc.gov/healthyweight      CDC home safety checklist: www.cdc.gov/steadi/patient.html      Agency on Aging: www.goea.[place].[facility]      Alcoholics anonymous (AA): www.aa.org      Physical Activity: www.tom.nih.gov/kj5dnkf      Tobacco use: www.quitwithusla.org

## 2022-03-11 NOTE — PROGRESS NOTES
"  Bj Pate presented for a  Medicare AWV and comprehensive Health Risk Assessment today. The following components were reviewed and updated:    · Medical history  · Family History  · Social history  · Allergies and Current Medications  · Health Risk Assessment  · Health Maintenance  · Care Team         ** See Completed Assessments for Annual Wellness Visit within the encounter summary.**         The following assessments were completed:  · Living Situation  · CAGE  · Depression Screening  · Timed Get Up and Go  · Whisper Test  · Cognitive Function Screening  · Nutrition Screening  · ADL Screening  · PAQ Screening        Vitals:    03/11/22 1005   BP: 100/72   BP Location: Right arm   Patient Position: Sitting   BP Method: Large (Manual)   Pulse: 88   Resp: 18   Temp: 97.9 °F (36.6 °C)   TempSrc: Oral   SpO2: 98%   Weight: (!) 148.8 kg (328 lb 0.7 oz)   Height: 5' 9" (1.753 m)     Body mass index is 48.44 kg/m².  Physical Exam          Diagnoses and health risks identified today and associated recommendations/orders:    1. Encounter for preventive health examination  Provided Bj with a 5-10 year written screening schedule and personal prevention plan. Recommendations were developed using the USPSTF age appropriate recommendations. Education, counseling, and referrals were provided as needed. After Visit Summary printed and given to patient which includes a list of additional screenings\tests needed.    2. Pre-diabetes  - Hemoglobin A1c; Future    3. Dependence on other enabling machines and devices    4. Other reduced mobility    5. Essential hypertension  - Basic Metabolic Panel; Future  - Lipid Panel; Future    6. Morbid obesity with BMI of 45.0-49.9, adult  Diet and exercise    7. History of CVA (cerebrovascular accident)  monitor    8. Aortic root dilatation  Followed by cardiology    9. Chronic kidney disease, stage 3a  No nsaids, increase fluid, recheck in 3 months      No follow-ups on file.    Jaida ABEL " MARCI Juarez  I offered to discuss advanced care planning, including how to pick a person who would make decisions for you if you were unable to make them for yourself, called a health care power of , and what kind of decisions you might make such as use of life sustaining treatments such as ventilators and tube feeding when faced with a life limiting illness recorded on a living will that they will need to know. (How you want to be cared for as you near the end of your natural life)     X Patient is interested in learning more about how to make advanced directives.  I provided them paperwork and offered to discuss this with them.

## 2022-03-11 NOTE — PROGRESS NOTES
Health Maintenance Due   Topic Date Due    Shingles Vaccine (1 of 2) The patient has a history of chicken pox    COVID-19 Vaccine (2 - Booster for Apurva series) 05/03/2021    Hemoglobin A1c  Pending order

## 2022-03-14 PROBLEM — I77.810 AORTIC ROOT DILATATION: Status: ACTIVE | Noted: 2022-03-14

## 2022-03-22 ENCOUNTER — TELEPHONE (OUTPATIENT)
Dept: BARIATRICS | Facility: CLINIC | Age: 72
End: 2022-03-22
Payer: MEDICARE

## 2022-03-23 ENCOUNTER — TELEPHONE (OUTPATIENT)
Dept: BARIATRICS | Facility: CLINIC | Age: 72
End: 2022-03-23
Payer: MEDICARE

## 2022-03-24 ENCOUNTER — PATIENT MESSAGE (OUTPATIENT)
Dept: PULMONOLOGY | Facility: CLINIC | Age: 72
End: 2022-03-24
Payer: MEDICARE

## 2022-03-24 ENCOUNTER — TELEPHONE (OUTPATIENT)
Dept: PULMONOLOGY | Facility: CLINIC | Age: 72
End: 2022-03-24
Payer: MEDICARE

## 2022-03-25 NOTE — TELEPHONE ENCOUNTER
----- Message from Crispin Lerma sent at 3/23/2022 12:15 PM CDT -----  Regarding: Referral  Good afternoon,    I made several attempts to contact the patient to schedule with Bariatrics. Mr. Pate did not return any of my calls.    Thanks,    Crispin Lerma  Access Navigator [facility]ariat[facility]

## 2022-04-08 ENCOUNTER — PATIENT OUTREACH (OUTPATIENT)
Dept: ADMINISTRATIVE | Facility: HOSPITAL | Age: 72
End: 2022-04-08
Payer: MEDICARE

## 2022-04-22 DIAGNOSIS — I10 ESSENTIAL HYPERTENSION: ICD-10-CM

## 2022-04-22 RX ORDER — DILTIAZEM HYDROCHLORIDE 240 MG/1
CAPSULE, COATED, EXTENDED RELEASE ORAL
Qty: 90 CAPSULE | Refills: 2 | Status: SHIPPED | OUTPATIENT
Start: 2022-04-22 | End: 2023-03-16 | Stop reason: SDUPTHER

## 2022-04-22 NOTE — TELEPHONE ENCOUNTER
No new care gaps identified.  Powered by [employer]. Reference number: [id number].   4/22/2022 8:09:36 AM CDT

## 2022-04-22 NOTE — TELEPHONE ENCOUNTER
Refill Authorization Note   Bj Pate  is requesting a refill authorization.  Brief Assessment and Rationale for Refill:  Approve     Medication Therapy Plan:       Medication Reconciliation Completed: No   Comments:     No Care Gaps recommended.     Note composed:11:58 AM 04/22/2022

## 2022-04-28 DIAGNOSIS — M1A.09X1 IDIOPATHIC CHRONIC GOUT OF MULTIPLE SITES WITH TOPHUS: ICD-10-CM

## 2022-04-28 RX ORDER — COLCHICINE 0.6 MG/1
CAPSULE ORAL
Qty: 90 CAPSULE | Refills: 3 | Status: SHIPPED | OUTPATIENT
Start: 2022-04-28 | End: 2022-09-28 | Stop reason: SDUPTHER

## 2022-04-28 NOTE — TELEPHONE ENCOUNTER
No new care gaps identified.  Powered by [employer]. Reference number: [id number].   4/28/2022 8:09:31 AM CDT

## 2022-04-28 NOTE — TELEPHONE ENCOUNTER
Refill Routing Note   Medication(s) are not appropriate for processing by [facility] for the following reason(s):      - Required laboratory values are outdated  - Required laboratory values are abnormal    [facility] action(s):  Defer          Medication reconciliation completed: No     Appointments  past 12m or future 3m with PCP    Date Provider   Last Visit   12/7/2021 Azikiwe K. Lombard, MD   Next Visit   7/8/2022 Azikiwe K. Lombard, MD   ED visits in past 90 days: 0        Note composed:12:12 PM 04/28/2022

## 2022-05-25 DIAGNOSIS — G56.23 ULNAR NEUROPATHY OF BOTH UPPER EXTREMITIES: ICD-10-CM

## 2022-05-25 DIAGNOSIS — M47.22 OSTEOARTHRITIS OF SPINE WITH RADICULOPATHY, CERVICAL REGION: ICD-10-CM

## 2022-05-25 NOTE — TELEPHONE ENCOUNTER
No new care gaps identified.  [facility] Embedded Care Gaps. Reference number: [id number]. 5/25/2022   8:09:56 AM KEOT

## 2022-06-08 RX ORDER — GABAPENTIN 600 MG/1
TABLET ORAL
Qty: 90 TABLET | Refills: 9 | Status: SHIPPED | OUTPATIENT
Start: 2022-06-08 | End: 2023-03-16 | Stop reason: SDUPTHER

## 2022-07-08 ENCOUNTER — OFFICE VISIT (OUTPATIENT)
Dept: FAMILY MEDICINE | Facility: CLINIC | Age: 72
End: 2022-07-08
Payer: MEDICARE

## 2022-07-08 VITALS
OXYGEN SATURATION: 97 % | BODY MASS INDEX: 46.65 KG/M2 | SYSTOLIC BLOOD PRESSURE: 130 MMHG | HEART RATE: 94 BPM | RESPIRATION RATE: 17 BRPM | TEMPERATURE: 98 F | DIASTOLIC BLOOD PRESSURE: 74 MMHG | WEIGHT: 315 LBS | HEIGHT: 69 IN

## 2022-07-08 DIAGNOSIS — M10.09 ACUTE IDIOPATHIC GOUT OF MULTIPLE SITES: ICD-10-CM

## 2022-07-08 DIAGNOSIS — M47.22 OSTEOARTHRITIS OF SPINE WITH RADICULOPATHY, CERVICAL REGION: ICD-10-CM

## 2022-07-08 DIAGNOSIS — I10 ESSENTIAL HYPERTENSION: Primary | ICD-10-CM

## 2022-07-08 DIAGNOSIS — E78.5 HYPERLIPIDEMIA, ACQUIRED: ICD-10-CM

## 2022-07-08 DIAGNOSIS — E78.2 MIXED HYPERLIPIDEMIA: ICD-10-CM

## 2022-07-08 PROBLEM — M47.812 OSTEOARTHRITIS OF CERVICAL SPINE: Status: RESOLVED | Noted: 2019-05-14 | Resolved: 2022-07-08

## 2022-07-08 PROBLEM — M25.612 DECREASED ROM OF LEFT SHOULDER: Status: RESOLVED | Noted: 2020-07-27 | Resolved: 2022-07-08

## 2022-07-08 PROBLEM — M25.611 DECREASED ROM OF RIGHT SHOULDER: Status: RESOLVED | Noted: 2020-07-27 | Resolved: 2022-07-08

## 2022-07-08 PROCEDURE — 3044F PR MOST RECENT HEMOGLOBIN A1C LEVEL <7.0%: ICD-10-PCS | Mod: CPTII,S$GLB,, | Performed by: FAMILY MEDICINE

## 2022-07-08 PROCEDURE — 3044F HG A1C LEVEL LT 7.0%: CPT | Mod: CPTII,S$GLB,, | Performed by: FAMILY MEDICINE

## 2022-07-08 PROCEDURE — 3078F PR MOST RECENT DIASTOLIC BLOOD PRESSURE < 80 MM HG: ICD-10-PCS | Mod: CPTII,S$GLB,, | Performed by: FAMILY MEDICINE

## 2022-07-08 PROCEDURE — 3288F FALL RISK ASSESSMENT DOCD: CPT | Mod: CPTII,S$GLB,, | Performed by: FAMILY MEDICINE

## 2022-07-08 PROCEDURE — 1160F RVW MEDS BY RX/DR IN RCRD: CPT | Mod: CPTII,S$GLB,, | Performed by: FAMILY MEDICINE

## 2022-07-08 PROCEDURE — 1125F PR PAIN SEVERITY QUANTIFIED, PAIN PRESENT: ICD-10-PCS | Mod: CPTII,S$GLB,, | Performed by: FAMILY MEDICINE

## 2022-07-08 PROCEDURE — 3075F PR MOST RECENT SYSTOLIC BLOOD PRESS GE 130-139MM HG: ICD-10-PCS | Mod: CPTII,S$GLB,, | Performed by: FAMILY MEDICINE

## 2022-07-08 PROCEDURE — 99999 PR PBB SHADOW E&M-EST. PATIENT-LVL IV: ICD-10-PCS | Mod: PBBFAC,,, | Performed by: FAMILY MEDICINE

## 2022-07-08 PROCEDURE — 3008F BODY MASS INDEX DOCD: CPT | Mod: CPTII,S$GLB,, | Performed by: FAMILY MEDICINE

## 2022-07-08 PROCEDURE — 99214 PR OFFICE/OUTPT VISIT, EST, LEVL IV, 30-39 MIN: ICD-10-PCS | Mod: S$GLB,,, | Performed by: FAMILY MEDICINE

## 2022-07-08 PROCEDURE — 1101F PR PT FALLS ASSESS DOC 0-1 FALLS W/OUT INJ PAST YR: ICD-10-PCS | Mod: CPTII,S$GLB,, | Performed by: FAMILY MEDICINE

## 2022-07-08 PROCEDURE — 99999 PR PBB SHADOW E&M-EST. PATIENT-LVL IV: CPT | Mod: PBBFAC,,, | Performed by: FAMILY MEDICINE

## 2022-07-08 PROCEDURE — 3075F SYST BP GE 130 - 139MM HG: CPT | Mod: CPTII,S$GLB,, | Performed by: FAMILY MEDICINE

## 2022-07-08 PROCEDURE — 3078F DIAST BP <80 MM HG: CPT | Mod: CPTII,S$GLB,, | Performed by: FAMILY MEDICINE

## 2022-07-08 PROCEDURE — 1160F PR REVIEW ALL MEDS BY PRESCRIBER/CLIN PHARMACIST DOCUMENTED: ICD-10-PCS | Mod: CPTII,S$GLB,, | Performed by: FAMILY MEDICINE

## 2022-07-08 PROCEDURE — 1125F AMNT PAIN NOTED PAIN PRSNT: CPT | Mod: CPTII,S$GLB,, | Performed by: FAMILY MEDICINE

## 2022-07-08 PROCEDURE — 1159F MED LIST DOCD IN RCRD: CPT | Mod: CPTII,S$GLB,, | Performed by: FAMILY MEDICINE

## 2022-07-08 PROCEDURE — 3008F PR BODY MASS INDEX (BMI) DOCUMENTED: ICD-10-PCS | Mod: CPTII,S$GLB,, | Performed by: FAMILY MEDICINE

## 2022-07-08 PROCEDURE — 1159F PR MEDICATION LIST DOCUMENTED IN MEDICAL RECORD: ICD-10-PCS | Mod: CPTII,S$GLB,, | Performed by: FAMILY MEDICINE

## 2022-07-08 PROCEDURE — 99214 OFFICE O/P EST MOD 30 MIN: CPT | Mod: S$GLB,,, | Performed by: FAMILY MEDICINE

## 2022-07-08 PROCEDURE — 1101F PT FALLS ASSESS-DOCD LE1/YR: CPT | Mod: CPTII,S$GLB,, | Performed by: FAMILY MEDICINE

## 2022-07-08 PROCEDURE — 3288F PR FALLS RISK ASSESSMENT DOCUMENTED: ICD-10-PCS | Mod: CPTII,S$GLB,, | Performed by: FAMILY MEDICINE

## 2022-07-08 RX ORDER — HYDROCHLOROTHIAZIDE 25 MG/1
25 TABLET ORAL DAILY
Qty: 90 TABLET | Refills: 1 | Status: SHIPPED | OUTPATIENT
Start: 2022-07-08 | End: 2022-12-29

## 2022-07-08 RX ORDER — FLUTICASONE PROPIONATE AND SALMETEROL 50; 250 UG/1; UG/1
1 POWDER RESPIRATORY (INHALATION) 2 TIMES DAILY
COMMUNITY
Start: 2022-07-07 | End: 2022-07-27

## 2022-07-08 RX ORDER — ATORVASTATIN CALCIUM 40 MG/1
40 TABLET, FILM COATED ORAL DAILY
Qty: 90 TABLET | Refills: 1 | Status: SHIPPED | OUTPATIENT
Start: 2022-07-08 | End: 2023-03-16 | Stop reason: SDUPTHER

## 2022-07-08 NOTE — PROGRESS NOTES
No chief complaint on file.      Bj Pate Jr. is a 72 y.o. male who presents per chief complain.  Chronic medical issues, if present, have been documented.  Acute medical issues, if present have been documented in the Chief Complaint.     Hypertension  This is a chronic problem. The current episode started more than 1 year ago. The problem has been gradually worsening since onset. The problem is controlled. Associated symptoms include shortness of breath (with minimal exertion). Pertinent negatives include no anxiety, blurred vision, chest pain, headaches, malaise/fatigue, neck pain, orthopnea, palpitations, peripheral edema, PND or sweats. Agents associated with hypertension include amphetamines. Risk factors for coronary artery disease include obesity, male gender, dyslipidemia and sedentary lifestyle. Past treatments include ACE inhibitors, calcium channel blockers and diuretics. The current treatment provides moderate improvement. Compliance problems include exercise.  There is no history of angina, kidney disease (improved), CAD/MI, CVA, heart failure, left ventricular hypertrophy, PVD or retinopathy. Identifiable causes of hypertension include sleep apnea. There is no history of chronic renal disease (improved), coarctation of the aorta, hyperaldosteronism, hypercortisolism, hyperparathyroidism, a hypertension causing med, pheochromocytoma, renovascular disease or a thyroid problem.       ROS  Review of Systems   Constitutional: Negative for malaise/fatigue.   Eyes: Negative for blurred vision.   Respiratory: Positive for shortness of breath (with minimal exertion).    Cardiovascular: Negative for chest pain, palpitations, orthopnea and PND.   Musculoskeletal: Negative for neck pain.   Neurological: Negative for headaches.     Physical Exam  Vitals:    07/08/22 1421   BP: 130/74   Pulse: 94   Resp: 17   Temp: 98.1 °F (36.7 °C)    Body mass index is 49.36 kg/m².  Weight: (!) 151.6 kg (334 lb 3.5 oz)  "  Height: 5' 9" (175.3 cm)     Physical Exam  Constitutional:       General: He is not in acute distress.     Appearance: Normal appearance. He is well-developed. He is not ill-appearing, toxic-appearing or diaphoretic.   HENT:      Head: Normocephalic and atraumatic.      Right Ear: Hearing and external ear normal. No decreased hearing noted.      Left Ear: Hearing and external ear normal. No decreased hearing noted.      Nose: Nose normal. No nasal deformity or rhinorrhea.      Mouth/Throat:      Dentition: Normal dentition. Does not have dentures.      Pharynx: Uvula midline.   Eyes:      General: Lids are normal. No scleral icterus.        Right eye: No foreign body or discharge.         Left eye: No foreign body or discharge.      Conjunctiva/sclera: Conjunctivae normal.      Right eye: No chemosis or exudate.     Left eye: No chemosis or exudate.     Pupils: Pupils are equal, round, and reactive to light.   Cardiovascular:      Rate and Rhythm: Normal rate and regular rhythm.      Heart sounds: Normal heart sounds, S1 normal and S2 normal. No murmur heard.    No friction rub. No gallop.   Pulmonary:      Effort: Pulmonary effort is normal. No accessory muscle usage or respiratory distress.      Breath sounds: Normal breath sounds. No decreased breath sounds, wheezing, rhonchi or rales.   Abdominal:      General: There is no distension.      Palpations: Abdomen is soft. Abdomen is not rigid.      Tenderness: There is no abdominal tenderness. There is no guarding or rebound.   Musculoskeletal:      Right hand: No swelling, deformity, lacerations, tenderness or bony tenderness. Normal range of motion. Normal strength. Decreased sensation. There is no disruption of two-point discrimination. Normal capillary refill.      Left hand: No swelling, deformity, lacerations, tenderness or bony tenderness. Normal range of motion. Normal strength. Decreased sensation. There is no disruption of two-point discrimination. " Normal capillary refill.        Hands:       Cervical back: Full passive range of motion without pain, normal range of motion and neck supple. Tenderness present. No swelling, deformity, lacerations, spasms or bony tenderness.      Lumbar back: Tenderness and bony tenderness present. No swelling, edema, deformity, signs of trauma, lacerations or spasms. Decreased range of motion. Negative right straight leg raise test and negative left straight leg raise test. No scoliosis.      Right ankle: Swelling present. No deformity, ecchymosis or lacerations. No tenderness. No lateral malleolus, medial malleolus, AITF ligament, CF ligament, posterior TF ligament or proximal fibula tenderness. Normal range of motion. Normal pulse.      Left ankle: Swelling present. No deformity, ecchymosis or lacerations. No tenderness. No lateral malleolus, medial malleolus, AITF ligament, CF ligament, posterior TF ligament or proximal fibula tenderness. Normal range of motion. Normal pulse.      Comments: Fingertips with decreased sensation; capillary refill appears normal.   Skin:     General: Skin is warm and dry.      Findings: No rash.   Neurological:      Mental Status: He is alert and oriented to person, place, and time.      Cranial Nerves: No cranial nerve deficit.      Sensory: No sensory deficit.      Motor: No abnormal muscle tone or seizure activity.      Coordination: Coordination normal.      Gait: Gait normal.   Psychiatric:         Attention and Perception: He is attentive.         Speech: Speech normal.         Behavior: Behavior normal. Behavior is cooperative.         Thought Content: Thought content normal.         Judgment: Judgment normal.       Assessment & Plan    Discussion of plan of care including treatment options regarding health and wellness were reviewed and discussed with patient.  Any changes to medication or treatment plan, as well as any screening blood test, imaging, or referrals to specialist, are  documented.  Follow up as indicated.     1. Essential hypertension  Patient was counseled and encouraged to maintain a low sodium diet, as well as increasing physical activity.  Recommend random BP checks at home on a regular basis.  Repeat BP at end of visit was not necessary. Will continue medication at this time, and follow up in 3-6 months, or sooner if blood pressure begins to increase.     - hydroCHLOROthiazide (HYDRODIURIL) 25 MG tablet; Take 1 tablet (25 mg total) by mouth once daily.  Dispense: 90 tablet; Refill: 1    2. Hyperlipidemia, acquired  We discussed ways to manage cholesterol levels, including increasing whole grain foods and decreasing fried and fatty foods.  I also recommended OTC Omega 3 and Omega 6 supplements to improve overall cholesterol levels.  Will continue current therapy at this visit and will monitor lipids appropriately.   - atorvastatin (LIPITOR) 40 MG tablet; Take 1 tablet (40 mg total) by mouth once daily.  Dispense: 90 tablet; Refill: 1    3. Osteoarthritis of spine with radiculopathy, cervical region  Patient is advised that arthritis is a result of regular daily use, and is caused by inflammation in the joint.  Patient was encouraged to exercise as tolerated, and attempt weight loss with sensible diet and lifestyle changes.  Patient was recommended to continue NSAID therapy to reduce inflammation, and to incorporate stretching into a daily routine.  Pain medication will be prescribed as necessary.  Patient should follow up if pain is not well controlled.     4. Mixed hyperlipidemia  Stable; no therapeutic changes at this time.  We discussed ways to manage cholesterol levels, including increasing whole grain foods and decreasing fried and fatty foods.  I also recommended OTC Omega 3 and Omega 6 supplements to improve overall cholesterol levels.  Will continue current therapy at this visit and will monitor lipids appropriately.     5. Acute idiopathic gout of multiple sites  The  current medical regimen will be continued at this time as discussed.  Medication prescribed for use only as symptoms require.        Follow up in about 6 months (around 1/8/2023) for Chronic Disease Management.      ACTIVE MEDICAL ISSUES:  Documented in Problem List    PAST MEDICAL HISTORY  Documented    PAST SURGICAL HISTORY:  Documented    SOCIAL HISTORY:  Documented    FAMILY HISTORY:  Documented    ALLERGIES AND MEDICATIONS: updated and reviewed.  Documented    Health Maintenance       Date Due Completion Date    Shingles Vaccine (1 of 2) Never done ---    Colorectal Cancer Screening 07/22/2022 7/22/2021    Override on 2/21/2005: Done    Influenza Vaccine (1) 09/01/2022 9/29/2021    Hemoglobin A1c 09/11/2022 3/11/2022    Override on 4/8/2016: Done (future)    Override on 1/4/2016: Done (future)    Override on 8/22/2014: Done    High Dose Statin 03/11/2023 3/11/2022    TETANUS VACCINE 02/19/2026 2/19/2016    Lipid Panel 03/11/2027 3/11/2022    Override on 8/22/2014: Done

## 2022-07-20 ENCOUNTER — TELEPHONE (OUTPATIENT)
Dept: ADMINISTRATIVE | Facility: HOSPITAL | Age: 72
End: 2022-07-20
Payer: MEDICARE

## 2022-07-20 ENCOUNTER — PATIENT OUTREACH (OUTPATIENT)
Dept: ADMINISTRATIVE | Facility: HOSPITAL | Age: 72
End: 2022-07-20
Payer: MEDICARE

## 2022-07-21 DIAGNOSIS — J44.9 COPD, MODERATE: Primary | ICD-10-CM

## 2022-07-21 NOTE — TELEPHONE ENCOUNTER
----- Message from Milvia Garces sent at 7/21/2022 11:06 AM CDT -----  Type: Patient Call Back    Who called:Self    What is the request in detail: Pt states that Dr. Lombard gave him an inhaler to try. The pt states that the medication works and would like a prescription sent to the pharmacy.    Wixela Inhaler 500/50    [id number] [place] [address] AT [address]   Phone:  [phone]  Fax:  [phone]          Can the clinic reply by [facility]? no    Would the patient rather a call back or a response via [facility]? Call back    Best call back number: [phone]    Additional Information:

## 2022-07-21 NOTE — TELEPHONE ENCOUNTER
No new care gaps identified.  [facility] Embedded Care Gaps. Reference number: [id number]. 7/21/2022   11:37:53 AM KEOT

## 2022-07-27 RX ORDER — FLUTICASONE PROPIONATE AND SALMETEROL 250; 50 UG/1; UG/1
1 POWDER RESPIRATORY (INHALATION) 2 TIMES DAILY
Qty: 60 EACH | Refills: 0 | Status: SHIPPED | OUTPATIENT
Start: 2022-07-27 | End: 2022-07-27

## 2022-07-27 RX ORDER — FLUTICASONE PROPIONATE AND SALMETEROL 500; 50 UG/1; UG/1
1 POWDER RESPIRATORY (INHALATION) 2 TIMES DAILY
Qty: 60 EACH | Refills: 11 | Status: SHIPPED | OUTPATIENT
Start: 2022-07-27 | End: 2023-03-16 | Stop reason: SDUPTHER

## 2022-07-27 RX ORDER — FLUTICASONE PROPIONATE AND SALMETEROL 500; 50 UG/1; UG/1
1 POWDER RESPIRATORY (INHALATION) 2 TIMES DAILY
Qty: 60 EACH | Refills: 0 | Status: SHIPPED | OUTPATIENT
Start: 2022-07-27 | End: 2022-07-27 | Stop reason: SDUPTHER

## 2022-07-27 NOTE — TELEPHONE ENCOUNTER
Patient states that during his last office visit with Dr. Lombard he was given a sample of Wixela to try and was told if it works for him to call back and he will send a script to his pharmacy. Patient is asking if you would send it for him since Dr. Lombard has left.

## 2022-07-27 NOTE — TELEPHONE ENCOUNTER
----- Message from Heydi Morejon MA sent at 7/27/2022  8:31 AM CDT -----  Type: Patient Call Back    Who called: Self    What is the request in detail: pt. Was given a sample inhaler at his last visit and he is asking for a prescription of that inhaler to be sent to his pharmacy.     Can the clinic reply by [facility]?no    Would the patient rather a call back or a response via [facility]? yes    Best call back number: [phone]

## 2022-09-09 ENCOUNTER — PATIENT OUTREACH (OUTPATIENT)
Dept: ADMINISTRATIVE | Facility: HOSPITAL | Age: 72
End: 2022-09-09
Payer: MEDICARE

## 2022-09-12 ENCOUNTER — PATIENT MESSAGE (OUTPATIENT)
Dept: ADMINISTRATIVE | Facility: HOSPITAL | Age: 72
End: 2022-09-12
Payer: MEDICARE

## 2022-09-28 DIAGNOSIS — M1A.09X1 IDIOPATHIC CHRONIC GOUT OF MULTIPLE SITES WITH TOPHUS: ICD-10-CM

## 2022-09-28 NOTE — TELEPHONE ENCOUNTER
----- Message from Miesha Brown sent at 9/28/2022  2:49 PM CDT -----  .Type: RX Refill Request    Who Called: self    Have you contacted your pharmacy:no    Refill or New Rx:refill    RX Name and Strength:colchicine (MITIGARE) 0.6 mg Cap      Preferred Pharmacy with phone number:.  [id number] - [place] [address]  [address] [place] [address] [address]  [facility] [address]  Phone: [phone] Fax: [phone]        Local or Mail Order:local    Would the patient rather a call back or a response via [facility]? call    Best Call Back Number:.[phone] (home)

## 2022-09-29 RX ORDER — COLCHICINE 0.6 MG/1
CAPSULE ORAL
Qty: 30 CAPSULE | Refills: 2 | Status: SHIPPED | OUTPATIENT
Start: 2022-09-29 | End: 2022-11-02

## 2022-10-31 ENCOUNTER — TELEPHONE (OUTPATIENT)
Dept: FAMILY MEDICINE | Facility: CLINIC | Age: 72
End: 2022-10-31
Payer: MEDICARE

## 2022-10-31 DIAGNOSIS — M1A.09X1 IDIOPATHIC CHRONIC GOUT OF MULTIPLE SITES WITH TOPHUS: ICD-10-CM

## 2022-10-31 NOTE — TELEPHONE ENCOUNTER
----- Message from Keke Carrasco sent at 10/31/2022 11:10 AM CDT -----  Type: Patient Call Back    Who called: self     What is the request in detail: patient would like to know if he can get a different medication with the same effect of colchicine (MITIGARE) 0.6 mg Cap due to medication to high in price. Please call    Can the clinic reply by [facility]? no    Would the patient rather a call back or a response via [facility]? call    Best call back number: .[phone]

## 2022-11-02 RX ORDER — COLCHICINE 0.6 MG/1
0.6 TABLET ORAL DAILY
Qty: 30 TABLET | Refills: 2 | Status: SHIPPED | OUTPATIENT
Start: 2022-11-02 | End: 2023-03-16 | Stop reason: SDUPTHER

## 2022-11-16 ENCOUNTER — TELEPHONE (OUTPATIENT)
Dept: FAMILY MEDICINE | Facility: CLINIC | Age: 72
End: 2022-11-16
Payer: MEDICARE

## 2022-11-16 NOTE — TELEPHONE ENCOUNTER
Left voice message and informed patient that we have to reschedule/cancel appointment. Informed patient to call back or reschedule in the portal. [facility] message sent.

## 2022-11-29 NOTE — PROGRESS NOTES
Consult received per MARCI for skin breakdown to buttock. Pt is known to wound care team during recent admission for moisture associated dermatitis to his gluteal cleft.    Pt was admitted on 3/5/20 as at transfer from [facility] for elevated WBC of 61 today. Pt has a PMHx of CVA without residual deficit, COPD, HILARIO on CPAP, Gout, DDD of Lumbar, Cervical Radiculopathy, HTN and recent Left CTS surgery presented on last admission with rapid onset polyarthritis of his UE and LE with associated fever, fatigue and decreased po intake, patient was admitted found to have gouty arthritis.      Received pt lying in bed awake and alert watching tv with waffle overlay in use with bryant catheter patent and draining at bedside. Pt reports that the area of to his backside had been getting better, but he thinks it might be worse.     Upon assessment of gluteal cleft (present upon assessment):moist pink denuded skin with an open area of partial skin thickness measuring 7 cm x 2 cm. Cleansed wound bed with sterile normal saline with TRIAD ointment applied to wound bed.     (see assessment and photo below)    Appropriate moisture management and pressure prevention intervention is place per nursing. TRIAD ointment left at bedside. Encouraged pt to take TRIAD with him post discharge to aid in wound healing.    Recommendations:  -Nursing to cleanse sacrum with cleansing wipes and apply Triad ointment BID and prn cleaning to sacrum. Triad ointment provides a hydrophilic environment to promote healing of exposed tissue and prevents breakdown of intact skin.  -Nursing to continue pressure prevention interventions as directed. Please assist pt with turning and repositioning every 2 hours with the use of a wedge/pillows, float bilateral heels with pillows or heel lift boots to alleviate pressure off bed surface, and apply border foams as needed to protect bony prominences.    Plan of care discussed with pt, pt's nurse, and with Dr. Anderson  via secure chat. Wound care will continue to follow pt PRN. [id number]         03/06/20 1600        Wound 03/02/20 1043 Moisture associated dermatitis Gluteal cleft   Date First Assessed/Time First Assessed: 03/02/20 1043   Pre-existing: Yes  Primary Wound Type: Moisture associated dermatitis  Location: Gluteal cleft   Wound Image    Wound WDL ex   Dressing Appearance Open to air;No dressing   Drainage Amount None   Drainage Characteristics/Odor No odor   Appearance Pink;Red;Moist   Tissue loss description Partial thickness   Periwound Area Denuded;Moist;Pink;Redness   Wound Edges Open   Wound Length (cm) 7 cm   Wound Width (cm) 2 cm   Wound Depth (cm) 0.1 cm   Wound Volume (cm^3) 1.4 cm^3   Wound Surface Area (cm^2) 14 cm^2   Care Cleansed with:;Sterile normal saline;Applied:;Skin Barrier   Dressing Applied;Other (see comments)  (TRIAD and left open to air)   Periwound Care Dry periwound area maintained;Topical treatment applied   Off Loading Other (see comments)  (waffle overlay in use)   Dressing Change Due 03/06/20      .

## 2023-01-18 ENCOUNTER — PES CALL (OUTPATIENT)
Dept: ADMINISTRATIVE | Facility: CLINIC | Age: 73
End: 2023-01-18
Payer: MEDICARE

## 2023-01-19 ENCOUNTER — HOSPITAL ENCOUNTER (EMERGENCY)
Facility: OTHER | Age: 73
Discharge: HOME | End: 2023-01-20
Attending: EMERGENCY MEDICINE
Payer: MEDICARE

## 2023-01-19 DIAGNOSIS — R10.33 PERIUMBILICAL PAIN: ICD-10-CM

## 2023-01-19 DIAGNOSIS — K42.9 UMBILICAL HERNIA WITHOUT OBSTRUCTION AND WITHOUT GANGRENE: Primary | ICD-10-CM

## 2023-01-19 LAB
BILIRUB UR QL STRIP: NEGATIVE
CLARITY UR: CLEAR
COLOR UR: YELLOW
GLUCOSE UR QL STRIP: NEGATIVE
HGB UR QL STRIP: NEGATIVE
KETONES UR QL STRIP: NEGATIVE
LEUKOCYTE ESTERASE UR QL STRIP: NEGATIVE
NITRITE UR QL STRIP: NEGATIVE
PH UR STRIP: 7 [PH] (ref 5–8)
POCT GLUCOSE: 104 MG/DL (ref 70–110)
PROT UR QL STRIP: NEGATIVE
SP GR UR STRIP: 1.01 (ref 1–1.03)
URN SPEC COLLECT METH UR: NORMAL
UROBILINOGEN UR STRIP-ACNC: NEGATIVE EU/DL

## 2023-01-19 PROCEDURE — 80053 COMPREHEN METABOLIC PANEL: CPT | Performed by: EMERGENCY MEDICINE

## 2023-01-19 PROCEDURE — 82962 GLUCOSE BLOOD TEST: CPT

## 2023-01-19 PROCEDURE — 86803 HEPATITIS C AB TEST: CPT | Performed by: EMERGENCY MEDICINE

## 2023-01-19 PROCEDURE — 87389 HIV-1 AG W/HIV-1&-2 AB AG IA: CPT | Performed by: EMERGENCY MEDICINE

## 2023-01-19 PROCEDURE — 96375 TX/PRO/DX INJ NEW DRUG ADDON: CPT

## 2023-01-19 PROCEDURE — 83690 ASSAY OF LIPASE: CPT | Performed by: EMERGENCY MEDICINE

## 2023-01-19 PROCEDURE — 96374 THER/PROPH/DIAG INJ IV PUSH: CPT

## 2023-01-19 PROCEDURE — 99285 EMERGENCY DEPT VISIT HI MDM: CPT | Mod: 25

## 2023-01-19 PROCEDURE — 85025 COMPLETE CBC W/AUTO DIFF WBC: CPT | Performed by: EMERGENCY MEDICINE

## 2023-01-19 PROCEDURE — 81003 URINALYSIS AUTO W/O SCOPE: CPT | Performed by: PHYSICIAN ASSISTANT

## 2023-01-19 RX ORDER — ONDANSETRON 2 MG/ML
8 INJECTION INTRAMUSCULAR; INTRAVENOUS ONCE AS NEEDED
Status: COMPLETED | OUTPATIENT
Start: 2023-01-19 | End: 2023-01-20

## 2023-01-19 RX ORDER — METOCLOPRAMIDE HYDROCHLORIDE 5 MG/ML
10 INJECTION INTRAMUSCULAR; INTRAVENOUS ONCE AS NEEDED
Status: COMPLETED | OUTPATIENT
Start: 2023-01-19 | End: 2023-01-20

## 2023-01-19 RX ORDER — HYDROMORPHONE HYDROCHLORIDE 1 MG/ML
0.5 INJECTION, SOLUTION INTRAMUSCULAR; INTRAVENOUS; SUBCUTANEOUS EVERY 30 MIN PRN
Status: DISCONTINUED | OUTPATIENT
Start: 2023-01-19 | End: 2023-01-20 | Stop reason: HOSPADM

## 2023-01-20 ENCOUNTER — TELEPHONE (OUTPATIENT)
Dept: FAMILY MEDICINE | Facility: CLINIC | Age: 73
End: 2023-01-20
Payer: MEDICARE

## 2023-01-20 VITALS
WEIGHT: 300 LBS | SYSTOLIC BLOOD PRESSURE: 134 MMHG | DIASTOLIC BLOOD PRESSURE: 71 MMHG | TEMPERATURE: 98 F | HEIGHT: 68 IN | BODY MASS INDEX: 45.47 KG/M2 | OXYGEN SATURATION: 97 % | HEART RATE: 86 BPM | RESPIRATION RATE: 12 BRPM

## 2023-01-20 LAB
ALBUMIN SERPL BCP-MCNC: 3.4 G/DL (ref 3.5–5.2)
ALP SERPL-CCNC: 80 U/L (ref 55–135)
ALT SERPL W/O P-5'-P-CCNC: 18 U/L (ref 10–44)
ANION GAP SERPL CALC-SCNC: 8 MMOL/L (ref 8–16)
AST SERPL-CCNC: 24 U/L (ref 10–40)
BASOPHILS # BLD AUTO: 0.02 K/UL (ref 0–0.2)
BASOPHILS NFR BLD: 0.3 % (ref 0–1.9)
BILIRUB SERPL-MCNC: 0.9 MG/DL (ref 0.1–1)
BILIRUB UR QL STRIP: NEGATIVE
BUN SERPL-MCNC: 13 MG/DL (ref 8–23)
CALCIUM SERPL-MCNC: 9.7 MG/DL (ref 8.7–10.5)
CHLORIDE SERPL-SCNC: 99 MMOL/L (ref 95–110)
CLARITY UR: CLEAR
CO2 SERPL-SCNC: 30 MMOL/L (ref 23–29)
COLOR UR: YELLOW
CREAT SERPL-MCNC: 1.3 MG/DL (ref 0.5–1.4)
DIFFERENTIAL METHOD: ABNORMAL
EOSINOPHIL # BLD AUTO: 0.1 K/UL (ref 0–0.5)
EOSINOPHIL NFR BLD: 1.7 % (ref 0–8)
ERYTHROCYTE [DISTWIDTH] IN BLOOD BY AUTOMATED COUNT: 14.6 % (ref 11.5–14.5)
EST. GFR  (NO RACE VARIABLE): 59 ML/MIN/1.73 M^2
GLUCOSE SERPL-MCNC: 104 MG/DL (ref 70–110)
GLUCOSE UR QL STRIP: NEGATIVE
HCT VFR BLD AUTO: 41.6 % (ref 40–54)
HCV AB SERPL QL IA: NEGATIVE
HGB BLD-MCNC: 13.5 G/DL (ref 14–18)
HGB UR QL STRIP: NEGATIVE
HIV 1+2 AB+HIV1 P24 AG SERPL QL IA: NEGATIVE
IMM GRANULOCYTES # BLD AUTO: 0.03 K/UL (ref 0–0.04)
IMM GRANULOCYTES NFR BLD AUTO: 0.4 % (ref 0–0.5)
KETONES UR QL STRIP: NEGATIVE
LDH SERPL L TO P-CCNC: 0.87 MMOL/L (ref 0.5–2.2)
LEUKOCYTE ESTERASE UR QL STRIP: NEGATIVE
LIPASE SERPL-CCNC: 10 U/L (ref 4–60)
LYMPHOCYTES # BLD AUTO: 1.8 K/UL (ref 1–4.8)
LYMPHOCYTES NFR BLD: 25.5 % (ref 18–48)
MCH RBC QN AUTO: 28.8 PG (ref 27–31)
MCHC RBC AUTO-ENTMCNC: 32.5 G/DL (ref 32–36)
MCV RBC AUTO: 89 FL (ref 82–98)
MONOCYTES # BLD AUTO: 0.9 K/UL (ref 0.3–1)
MONOCYTES NFR BLD: 13.4 % (ref 4–15)
NEUTROPHILS # BLD AUTO: 4 K/UL (ref 1.8–7.7)
NEUTROPHILS NFR BLD: 58.7 % (ref 38–73)
NITRITE UR QL STRIP: NEGATIVE
NRBC BLD-RTO: 0 /100 WBC
PH UR STRIP: 7 [PH] (ref 5–8)
PLATELET # BLD AUTO: 181 K/UL (ref 150–450)
PMV BLD AUTO: 10.5 FL (ref 9.2–12.9)
POTASSIUM SERPL-SCNC: 4.6 MMOL/L (ref 3.5–5.1)
PROT SERPL-MCNC: 7.6 G/DL (ref 6–8.4)
PROT UR QL STRIP: ABNORMAL
RBC # BLD AUTO: 4.68 M/UL (ref 4.6–6.2)
SAMPLE: NORMAL
SODIUM SERPL-SCNC: 137 MMOL/L (ref 136–145)
SP GR UR STRIP: 1.02 (ref 1–1.03)
URN SPEC COLLECT METH UR: ABNORMAL
UROBILINOGEN UR STRIP-ACNC: NEGATIVE EU/DL
WBC # BLD AUTO: 6.86 K/UL (ref 3.9–12.7)

## 2023-01-20 PROCEDURE — 63600175 PHARM REV CODE 636 W HCPCS: Performed by: EMERGENCY MEDICINE

## 2023-01-20 PROCEDURE — 93010 ELECTROCARDIOGRAM REPORT: CPT | Mod: ,,, | Performed by: INTERNAL MEDICINE

## 2023-01-20 PROCEDURE — 25500020 PHARM REV CODE 255: Performed by: EMERGENCY MEDICINE

## 2023-01-20 PROCEDURE — 93005 ELECTROCARDIOGRAM TRACING: CPT

## 2023-01-20 PROCEDURE — 81003 URINALYSIS AUTO W/O SCOPE: CPT | Performed by: EMERGENCY MEDICINE

## 2023-01-20 PROCEDURE — 93010 EKG 12-LEAD: ICD-10-PCS | Mod: ,,, | Performed by: INTERNAL MEDICINE

## 2023-01-20 RX ORDER — ACETAMINOPHEN 325 MG/1
650 TABLET ORAL EVERY 6 HOURS PRN
Qty: 30 TABLET | Refills: 0 | Status: SHIPPED | OUTPATIENT
Start: 2023-01-20 | End: 2023-12-11

## 2023-01-20 RX ORDER — HYDROCODONE BITARTRATE AND ACETAMINOPHEN 5; 325 MG/1; MG/1
1 TABLET ORAL EVERY 4 HOURS PRN
Qty: 10 TABLET | Refills: 0 | Status: SHIPPED | OUTPATIENT
Start: 2023-01-20 | End: 2023-01-23

## 2023-01-20 RX ADMIN — METOCLOPRAMIDE 10 MG: 5 INJECTION, SOLUTION INTRAMUSCULAR; INTRAVENOUS at 12:01

## 2023-01-20 RX ADMIN — ONDANSETRON 8 MG: 2 INJECTION INTRAMUSCULAR; INTRAVENOUS at 12:01

## 2023-01-20 RX ADMIN — IOHEXOL 100 ML: 350 INJECTION, SOLUTION INTRAVENOUS at 12:01

## 2023-01-20 RX ADMIN — HYDROMORPHONE HYDROCHLORIDE 0.5 MG: 0.5 INJECTION, SOLUTION INTRAMUSCULAR; INTRAVENOUS; SUBCUTANEOUS at 12:01

## 2023-01-20 NOTE — TELEPHONE ENCOUNTER
----- Message from Lucy Danielle sent at 1/20/2023  2:02 PM CST -----  Name of Caller pt   Reason for Visit/Symptoms pt requesting to be seen asap for hosp f/u. Previous pt of lombard. Call pt   Best Contact Number or Confirm if Mychart Preferred [phone] (home)     Preferred Date/Time of Appointment asap   Interested in Virtual Visit (yes/no)  Additional Information

## 2023-01-20 NOTE — ED PROVIDER NOTES
"  Source of History:  Medical record, patient    Chief complaint:  Per triage note: "Back Pain (Pt with bilateral lower back pain that started last night. Pt reports hx of kidney infections.)  "    HPI:    Patient presents with lower back pain radiating to his anterior abdomen since yesterday. He reports no exacerbating factors.  Pain not improved with acetaminophen.  Denies any recent trauma, heavy lifting, or unusual activity.  He notes decreased urinary output.  He denies any associated fever, nausea, or vomiting. He notes that he had an isolated episode of diarrhea two nights ago but denies any current diarrhea.     This is the extent of the patient's complaints at this time.     ROS:     ROS: As per HPI and below:   General: No fever.   Cardiovascular: No chest pain.   Respiratory: No dyspnea.   GI: No abdominal pain. No n/v. No stool incontinence/retention.   : No urine incontinence/retention.  Neuro: No focal strength or sensation deficits.  Musculoskeletal: Notes back pain. No swelling.    All other systems negative.         Review of patient's allergies indicates:   Allergen Reactions    Tomato (solanum lycopersicum) Hives    Naproxen Hives    Shrimp Other (See Comments)       PMH:  As per HPI and below:  Past Medical History:   Diagnosis Date    Anemia 06/04/2021    Anticoagulant long-term use     Basal ganglia hemorrhage     Left basal ganglia hemorrhage with resultant right-sided hemiparesis which has resolved.     Benign hypertension with CKD (chronic kidney disease) stage III      Cataract     Chronic idiopathic gout of multiple sites     Chronic kidney disease, stage 3     COPD (chronic obstructive pulmonary disease)     Erectile dysfunction     Gout     Hemorrhoids without complication     Hyperlipidemia     Morbid obesity     Obstructive sleep apnea on CPAP     Reactive airway disease without complication 11/12/2021    Stroke 2016, 2006    Thalamic infarct, acute (right) 01/2016    Type 2 DM with " CKD stage 3 and hypertension     On pravastatin for cardiovascular protection.        Past Surgical History:   Procedure Laterality Date    CARPAL TUNNEL RELEASE Left 2/19/2020    Procedure: RELEASE, CARPAL TUNNEL LEFT;  Surgeon: Maria Luisa Mccurdy MD;  Location: [facility] OR;  Service: Orthopedics;  Laterality: Left;    EPIDURAL STEROID INJECTION N/A 5/2/2019    Procedure: Injection, Steroid, Epidural Cervical;  Surgeon: Dariel Doan Jr., MD;  Location: [facility] ENDO;  Service: Pain Management;  Laterality: N/A;  Cervical Epidural Steroid Injection     52989    Arrive @ 1130; ASA; Check BG    EPIDURAL STEROID INJECTION Bilateral 5/29/2019    Procedure: Lumbar Medial Branch Blocks;  Surgeon: Dariel Doan Jr., MD;  Location: [facility];  Service: Pain Management;  Laterality: Bilateral;  Bilateral Lumbar Medial Branch Blocks L3, L4, L5    [id number]  [id number]    Attive @ 1030 (11 arrival request); NO Sedation; ASA; Check BG    EPIDURAL STEROID INJECTION Bilateral 7/3/2019    Procedure: Lumbar Medial Branch Blocks;  Surgeon: Dariel Doan Jr., MD;  Location: [facility];  Service: Pain Management;  Laterality: Bilateral;  Bilateral Lumbar Medial Branch Blocks L3, L4, L5    08803  48871    Arrive @ 0930; NO Sedation; ASA; Check BG    EPIDURAL STEROID INJECTION N/A 8/7/2019    Procedure: Injection, Steroid, Epidural Cervical;  Surgeon: Dariel Doan Jr., MD;  Location: [facility] ENDO;  Service: Pain Management;  Laterality: N/A;  Cervical Epidural Steroid Injection C7-T1    16387    Arrive @ 1115; Last ASA 7/30; Check BG    LEFT HEART CATHETERIZATION Left 9/21/2021    Procedure: Left heart cath 9am start, R rad access;  Surgeon: Dariel Conner MD;  Location: [facility] CATH LAB;  Service: Cardiology;  Laterality: Left;  RN PRE OP Covid NEGATIVE ON  9-20-21.  C A    NO PAST SURGERIES         Social History     Tobacco Use    Smoking status: Never    Smokeless tobacco: Never   Substance Use Topics    Alcohol use: Yes  "    Alcohol/week: 0.0 standard drinks     Comment: occacionally    Drug use: No       Physical Exam:      Nursing note and vitals reviewed.  /71   Pulse 86   Temp 98.1 °F (36.7 °C)   Resp 12   Ht 5' 8" (1.727 m)   Wt 136.1 kg (300 lb)   SpO2 97%   BMI 45.61 kg/m²     Constitutional: AAOx3. No distress. Son at bedside.  Eyes: EOMI. No discharge. Anicteric.  HENT:   Neck: Normal range of motion. Neck supple.  Cardiovascular: Normal rate. No murmur, no gallop and no friction rub heard.   Pulmonary/Chest: No respiratory distress. Effort normal. No wheezes, no rales, no rhonchi.   Abdominal:  Obese abdomen.  Bowel sounds normal. Soft. No distension and no mass. Periumbilical abdominal tenderness. There is no rebound, no guarding, no tenderness at McBurney's point.  Musculoskeletal: Normal range of motion. No C/T/L midline spinal tenderness, step offs, or deformities. No paraspinal tenderness.  Neurological: GCS 15. Alert and oriented to person, place, and time. No gross cranial nerve, light touch or strength deficit. Coordination normal.   Skin: Skin is warm and dry.   EXT: 2+ radial pulses.   Psychiatric: Behavior is normal. Judgment normal.        MDM:      ED Course as of 01/20/23 0208   Thu Jan 19, 2023   2312 Patient is a 72-year-old male with diabetes, CKD, obesity, COPD, history of stroke, on chronic anticoagulation who presents with bilateral back pain, left-sided abdominal pain.  Denies any trauma, heavy lifting, unusual activity, change in urination, hematuria, dysuria.  Denies history of similar symptoms.  He does not have any focal deficits.    On exam, patient has periumbilical tenderness, no midline spinal tenderness, no CVA tenderness.   Initial differential included acute pancreatitis, GERD, perforated viscus, bowel obstruction, acute appendicitis, nephrolithiasis, pyelonephritis.       [RC]   Fri Jan 20, 2023   0008 --  EKG Interpretation: I independently reviewed and interpreted EKG with " 0019 hrs timestamp.  Time stamp is incorrect due to apparent software issue.  It shows normal sinus rhythm at 74 beats per minute, no STEMI, no significant acute ST/T abnormalities, normal intervals.  No acute change compared to prior tracing.  --   [RC]   0042 I independently reviewed and interpreted labs (CBC, CMP, lipase) which are unremarkable and unrevealing.   [RC]   0201 I independently reviewed and interpreted CT abdomen/pelvis, notable for no free air, fluid collection, or evidence of obstructive process.  There is a small fat containing umbilical hernia.    On reassessment, patient appears clinically improved, reports his pain is much improved.    Plan is maximal supportive care, general surgery follow-up.  I clarified garbled text that made it into the original read with radiologist Dr. Noe who confirms the garbled sectioned did not contain any important clinical findings.  Patient given strict return precautions.    --  I discussed with patient and/or guardian/caretaker that this evaluation in the ED does not suggest any emergent or life threatening medical condition requiring admission or further immediate intervention or diagnostics. Regardless, an unremarkable evaluation in the ED does not preclude the development or presence of a serious or life threatening condition. As such, patient was instructed to return for any worsening, new, changed, or concerning symptoms.     I had a detailed discussion with patient and/or guardian/caretaker regarding findings, plan, return precautions, importance of medication adherence, need to follow-up with a PCP and specialist. All questions answered.     Management decisions for this encounter made during COVID-19 public health emergency. Available resources, standards for appropriate emergency department evaluation, and admission vs. discharge standards have necessarily shifted and remain dynamic.     Note was created using voice recognition software. It may  have occasional typographical errors not identified and edited despite initial review prior to signing.   [RC]      ED Course User Index  [RC] Odell Lozada MD       Medications   HYDROmorphone injection 0.5 mg (0.5 mg Intravenous Given 1/20/23 0001)   metoclopramide HCl injection 10 mg (10 mg Intravenous Given 1/20/23 0000)   ondansetron injection 8 mg (8 mg Intravenous Given 1/20/23 0000)   iohexoL (OMNIPAQUE 350) injection 100 mL (100 mLs Intravenous Given 1/20/23 0047)            ---  IRodger, scribed for, and in the presence of, Dr. Lozada. I performed the scribed service and the documentation accurately describes the services I performed. I attest to the accuracy of the note.     Physician Attestation for Scribe:   I, Odell Lozada MD, reviewed documentation as scribed in my presence, which is both accurate and complete.    Diagnostic Impression:    1. Umbilical hernia without obstruction and without gangrene    2. Periumbilical pain         ED Disposition Condition    Discharge Good          Future Appointments   Date Time Provider Department Center   3/16/2023  3:00 PM Jose Antonio Foster MD [facility]      ED Prescriptions       Medication Sig Dispense Start Date End Date Auth. Provider    HYDROcodone-acetaminophen (NORCO) 5-325 mg per tablet Take 1 tablet by mouth every 4 (four) hours as needed (severe pain not improved by other measures.). 10 tablet 1/20/2023 1/23/2023 Odell Lozada MD    acetaminophen (TYLENOL) 325 MG tablet Take 2 tablets (650 mg total) by mouth every 6 (six) hours as needed for Pain or Temperature greater than (100.3). 30 tablet 1/20/2023 -- Odell Lozada MD          Follow-up Information       Follow up With Specialties Details Why Contact Info Additional Information    Azikiwe K. Lombard, MD Family Medicine Schedule an appointment as soon as possible for a visit  For recheck with your primary care doctor [facility] [place] [facility] [address]  [phone]        [facility] - General Surgery Surgery Schedule an appointment as soon as possible for a visit  For recheck with specialist [address] [address] [phone] [place] [address]               Odell Lozada MD  01/20/23 0205

## 2023-01-20 NOTE — ED TRIAGE NOTES
Patient reports bilateral lower back pain wrapping around hips and pelvis region, unable to recall any activity/event to cause pain. Onset yesterday, denies any numbness and tingling. +2 edema noted to bilateral lower legs and feet.

## 2023-01-20 NOTE — DISCHARGE INSTRUCTIONS
Thank you for letting us take care of you today! It was nice meeting you, and I hope you feel better soon.     Call your primary care doctor to make the first available appointment.     Keep all your medical appointments.     Take your regular medication as prescribed. Contact your primary care provider before running out of medication, or for any problems obtaining them.    Do not drive or operate heavy machinery while taking opioid or muscle relaxing medications. Examples include norco, percocet, xanax, valium, flexeril.     Overuse or long term use of pain and sedating medication may lead to addiction, dependence, organ failure, family and work problems, legal problems, accidental overdose and death.    If you do not have health insurance, you probably can afford it:  Call [phone] ([facility] hotline) or go to www.[employer].[place].gov    Your evaluation in the ER does not suggest any emergent or life threatening medical condition requiring admission or immediate intervention beyond that provided in the ER.   However, the signs of a serious problem sometimes take more time to appear.     Do not hesitate to return to the ER if any of the following occur:    Weakness, dizziness, fainting, or loss of consciousness   Fever of 100.4ºF (38ºC) or higher  Any worse symptoms  Any new or concerning symptoms    To protect yourself and others from COVID19:  Get vaccinated.   Anyone over 6 months old is eligible for vaccination.   If your last dose was over 6 months ago, you are probably due for another shot.   Vaccination is shown to prevent getting sick, ending up in the hospital, or dying because of COVID19.     If not vaccinated or over a long time since your last dose:  Your shot is waiting for you. To get it:   Text your ZIP code to GETVAX ([id number]) or VACUNA ([id number]) in Urdu  call 311, or [phone], or [phone], or [phone],   go to www.vaccines.gov, or  Call your health provider    If exposed to someone with  cold, flu, or COVID19 symptoms, consider quarantining or at least wearing a mask around others for at least 5 days.   Even if you have no symptoms   Otherwise you could give the virus to someone who dies from it    Some symptoms of COVID19 include congestion, fever, cough, muscle aches, sore throat, breathing troubles, headaches, stomach upset, diarrhea.   Every [place] household is eligible to order 4 free at-home COVID-19 tests from www.covidtests.gov    Overview  A hernia is when intestines or other tissues bulge through an opening or a weak spot in the stomach muscles. The hernia has a sac that may hold some intestine, fat, or fluid. Many hernias are caused by pressure near the belly button. Pressure may come from increased weight, repeated straining, or pregnancy.    A very small hernia may not cause problems. But your doctor may recommend repairing the muscle. This helps you avoid the risk that the hernia might trap some of the tissues or intestine. This could be an emergency.    Follow-up care is a key part of your treatment and safety. Be sure to make and go to all appointments, and call your doctor if you are having problems. It's also a good idea to know your test results and keep a list of the medicines you take.    How can you care for yourself at home?  Watch for any signs that the hernia may be causing problems. Your belly may get bigger, and the skin over the hernia may look red. You may have pain or feel bulging or pressure from the hernia. Call your doctor right away if you see these signs.

## 2023-01-23 ENCOUNTER — OFFICE VISIT (OUTPATIENT)
Dept: SURGERY | Facility: CLINIC | Age: 73
End: 2023-01-23
Attending: SPECIALIST
Payer: MEDICARE

## 2023-01-23 VITALS
HEART RATE: 90 BPM | SYSTOLIC BLOOD PRESSURE: 164 MMHG | HEIGHT: 66 IN | BODY MASS INDEX: 50.62 KG/M2 | WEIGHT: 315 LBS | OXYGEN SATURATION: 95 % | DIASTOLIC BLOOD PRESSURE: 76 MMHG

## 2023-01-23 DIAGNOSIS — R10.11 RUQ PAIN: Primary | ICD-10-CM

## 2023-01-23 PROCEDURE — 1159F PR MEDICATION LIST DOCUMENTED IN MEDICAL RECORD: ICD-10-PCS | Mod: CPTII,S$GLB,, | Performed by: SPECIALIST

## 2023-01-23 PROCEDURE — 3288F PR FALLS RISK ASSESSMENT DOCUMENTED: ICD-10-PCS | Mod: CPTII,S$GLB,, | Performed by: SPECIALIST

## 2023-01-23 PROCEDURE — 3008F PR BODY MASS INDEX (BMI) DOCUMENTED: ICD-10-PCS | Mod: CPTII,S$GLB,, | Performed by: SPECIALIST

## 2023-01-23 PROCEDURE — 1125F PR PAIN SEVERITY QUANTIFIED, PAIN PRESENT: ICD-10-PCS | Mod: CPTII,S$GLB,, | Performed by: SPECIALIST

## 2023-01-23 PROCEDURE — 3078F PR MOST RECENT DIASTOLIC BLOOD PRESSURE < 80 MM HG: ICD-10-PCS | Mod: CPTII,S$GLB,, | Performed by: SPECIALIST

## 2023-01-23 PROCEDURE — 99202 PR OFFICE/OUTPT VISIT, NEW, LEVL II, 15-29 MIN: ICD-10-PCS | Mod: S$GLB,,, | Performed by: SPECIALIST

## 2023-01-23 PROCEDURE — 1101F PR PT FALLS ASSESS DOC 0-1 FALLS W/OUT INJ PAST YR: ICD-10-PCS | Mod: CPTII,S$GLB,, | Performed by: SPECIALIST

## 2023-01-23 PROCEDURE — 1160F PR REVIEW ALL MEDS BY PRESCRIBER/CLIN PHARMACIST DOCUMENTED: ICD-10-PCS | Mod: CPTII,S$GLB,, | Performed by: SPECIALIST

## 2023-01-23 PROCEDURE — 3077F SYST BP >= 140 MM HG: CPT | Mod: CPTII,S$GLB,, | Performed by: SPECIALIST

## 2023-01-23 PROCEDURE — 3288F FALL RISK ASSESSMENT DOCD: CPT | Mod: CPTII,S$GLB,, | Performed by: SPECIALIST

## 2023-01-23 PROCEDURE — 3078F DIAST BP <80 MM HG: CPT | Mod: CPTII,S$GLB,, | Performed by: SPECIALIST

## 2023-01-23 PROCEDURE — 99202 OFFICE O/P NEW SF 15 MIN: CPT | Mod: S$GLB,,, | Performed by: SPECIALIST

## 2023-01-23 PROCEDURE — 1125F AMNT PAIN NOTED PAIN PRSNT: CPT | Mod: CPTII,S$GLB,, | Performed by: SPECIALIST

## 2023-01-23 PROCEDURE — 3008F BODY MASS INDEX DOCD: CPT | Mod: CPTII,S$GLB,, | Performed by: SPECIALIST

## 2023-01-23 PROCEDURE — 1160F RVW MEDS BY RX/DR IN RCRD: CPT | Mod: CPTII,S$GLB,, | Performed by: SPECIALIST

## 2023-01-23 PROCEDURE — 1101F PT FALLS ASSESS-DOCD LE1/YR: CPT | Mod: CPTII,S$GLB,, | Performed by: SPECIALIST

## 2023-01-23 PROCEDURE — 99999 PR PBB SHADOW E&M-EST. PATIENT-LVL IV: ICD-10-PCS | Mod: PBBFAC,,, | Performed by: SPECIALIST

## 2023-01-23 PROCEDURE — 1159F MED LIST DOCD IN RCRD: CPT | Mod: CPTII,S$GLB,, | Performed by: SPECIALIST

## 2023-01-23 PROCEDURE — 3077F PR MOST RECENT SYSTOLIC BLOOD PRESSURE >= 140 MM HG: ICD-10-PCS | Mod: CPTII,S$GLB,, | Performed by: SPECIALIST

## 2023-01-23 PROCEDURE — 99999 PR PBB SHADOW E&M-EST. PATIENT-LVL IV: CPT | Mod: PBBFAC,,, | Performed by: SPECIALIST

## 2023-01-23 RX ORDER — HYDROCODONE BITARTRATE AND ACETAMINOPHEN 5; 325 MG/1; MG/1
1 TABLET ORAL EVERY 6 HOURS PRN
Qty: 20 TABLET | Refills: 0 | Status: SHIPPED | OUTPATIENT
Start: 2023-01-23 | End: 2023-07-06
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
## 2023-01-28 ENCOUNTER — HOSPITAL ENCOUNTER (OUTPATIENT)
Dept: RADIOLOGY | Facility: OTHER | Age: 73
Discharge: HOME | End: 2023-01-28
Attending: SPECIALIST
Payer: MEDICARE

## 2023-01-28 DIAGNOSIS — R10.11 RUQ PAIN: ICD-10-CM

## 2023-01-28 PROCEDURE — 76705 US ABDOMEN LIMITED: ICD-10-PCS | Mod: 26,,, | Performed by: RADIOLOGY

## 2023-01-28 PROCEDURE — 76705 ECHO EXAM OF ABDOMEN: CPT | Mod: 26,,, | Performed by: RADIOLOGY

## 2023-01-28 PROCEDURE — 76705 ECHO EXAM OF ABDOMEN: CPT | Mod: TC

## 2023-02-01 LAB
LEFT EYE DM RETINOPATHY: NEGATIVE
RIGHT EYE DM RETINOPATHY: NEGATIVE

## 2023-02-06 ENCOUNTER — PES CALL (OUTPATIENT)
Dept: ADMINISTRATIVE | Facility: CLINIC | Age: 73
End: 2023-02-06
Payer: MEDICARE

## 2023-02-10 ENCOUNTER — TELEPHONE (OUTPATIENT)
Dept: SURGERY | Facility: CLINIC | Age: 73
End: 2023-02-10
Payer: MEDICARE

## 2023-02-17 ENCOUNTER — PATIENT OUTREACH (OUTPATIENT)
Dept: ADMINISTRATIVE | Facility: HOSPITAL | Age: 73
End: 2023-02-17
Payer: MEDICARE

## 2023-02-17 NOTE — PROGRESS NOTES
HM and immunization's reviewed and updated. Upload recent eye exam from Dr Zafar office 02/01/2023

## 2023-03-06 ENCOUNTER — TELEPHONE (OUTPATIENT)
Dept: ADMINISTRATIVE | Facility: CLINIC | Age: 73
End: 2023-03-06
Payer: MEDICARE

## 2023-03-06 NOTE — TELEPHONE ENCOUNTER
Called pt, informed pt I was calling to remind pt of his in office EAWV on 3/7/23; clinic location provided to patient; pt confirmed appointment

## 2023-03-07 ENCOUNTER — OFFICE VISIT (OUTPATIENT)
Dept: FAMILY MEDICINE | Facility: CLINIC | Age: 73
End: 2023-03-07
Payer: MEDICARE

## 2023-03-07 ENCOUNTER — LAB VISIT (OUTPATIENT)
Dept: LAB | Facility: HOSPITAL | Age: 73
End: 2023-03-07
Attending: EMERGENCY MEDICINE
Payer: MEDICARE

## 2023-03-07 VITALS
OXYGEN SATURATION: 98 % | SYSTOLIC BLOOD PRESSURE: 104 MMHG | TEMPERATURE: 98 F | HEART RATE: 91 BPM | HEIGHT: 66 IN | WEIGHT: 290.13 LBS | BODY MASS INDEX: 46.63 KG/M2 | RESPIRATION RATE: 17 BRPM | DIASTOLIC BLOOD PRESSURE: 64 MMHG

## 2023-03-07 DIAGNOSIS — R73.03 PREDIABETES: ICD-10-CM

## 2023-03-07 DIAGNOSIS — I77.810 AORTIC ROOT DILATATION: ICD-10-CM

## 2023-03-07 DIAGNOSIS — I27.20 PULMONARY HYPERTENSION: ICD-10-CM

## 2023-03-07 DIAGNOSIS — E66.01 MORBID OBESITY WITH BMI OF 45.0-49.9, ADULT: ICD-10-CM

## 2023-03-07 DIAGNOSIS — Z00.00 ENCOUNTER FOR PREVENTIVE HEALTH EXAMINATION: Primary | ICD-10-CM

## 2023-03-07 DIAGNOSIS — Z12.11 COLON CANCER SCREENING: ICD-10-CM

## 2023-03-07 DIAGNOSIS — Z86.73 HISTORY OF CVA (CEREBROVASCULAR ACCIDENT): ICD-10-CM

## 2023-03-07 DIAGNOSIS — N18.31 CHRONIC KIDNEY DISEASE, STAGE 3A: ICD-10-CM

## 2023-03-07 DIAGNOSIS — R26.9 ABNORMALITY OF GAIT AND MOBILITY: ICD-10-CM

## 2023-03-07 DIAGNOSIS — Z74.09 OTHER REDUCED MOBILITY: ICD-10-CM

## 2023-03-07 LAB
ESTIMATED AVG GLUCOSE: 128 MG/DL (ref 68–131)
HBA1C MFR BLD: 6.1 % (ref 4–5.6)

## 2023-03-07 PROCEDURE — 1159F MED LIST DOCD IN RCRD: CPT | Mod: CPTII,S$GLB,, | Performed by: PHYSICIAN ASSISTANT

## 2023-03-07 PROCEDURE — 99999 PR PBB SHADOW E&M-EST. PATIENT-LVL V: ICD-10-PCS | Mod: PBBFAC,,, | Performed by: PHYSICIAN ASSISTANT

## 2023-03-07 PROCEDURE — 3078F DIAST BP <80 MM HG: CPT | Mod: CPTII,S$GLB,, | Performed by: PHYSICIAN ASSISTANT

## 2023-03-07 PROCEDURE — 83036 HEMOGLOBIN GLYCOSYLATED A1C: CPT | Performed by: PHYSICIAN ASSISTANT

## 2023-03-07 PROCEDURE — G0439 PPPS, SUBSEQ VISIT: HCPCS | Mod: S$GLB,,, | Performed by: PHYSICIAN ASSISTANT

## 2023-03-07 PROCEDURE — 1170F FXNL STATUS ASSESSED: CPT | Mod: CPTII,S$GLB,, | Performed by: PHYSICIAN ASSISTANT

## 2023-03-07 PROCEDURE — 3074F PR MOST RECENT SYSTOLIC BLOOD PRESSURE < 130 MM HG: ICD-10-PCS | Mod: CPTII,S$GLB,, | Performed by: PHYSICIAN ASSISTANT

## 2023-03-07 PROCEDURE — 3008F PR BODY MASS INDEX (BMI) DOCUMENTED: ICD-10-PCS | Mod: CPTII,S$GLB,, | Performed by: PHYSICIAN ASSISTANT

## 2023-03-07 PROCEDURE — 3074F SYST BP LT 130 MM HG: CPT | Mod: CPTII,S$GLB,, | Performed by: PHYSICIAN ASSISTANT

## 2023-03-07 PROCEDURE — 3008F BODY MASS INDEX DOCD: CPT | Mod: CPTII,S$GLB,, | Performed by: PHYSICIAN ASSISTANT

## 2023-03-07 PROCEDURE — 1160F PR REVIEW ALL MEDS BY PRESCRIBER/CLIN PHARMACIST DOCUMENTED: ICD-10-PCS | Mod: CPTII,S$GLB,, | Performed by: PHYSICIAN ASSISTANT

## 2023-03-07 PROCEDURE — 99999 PR PBB SHADOW E&M-EST. PATIENT-LVL V: CPT | Mod: PBBFAC,,, | Performed by: PHYSICIAN ASSISTANT

## 2023-03-07 PROCEDURE — G0439 PR MEDICARE ANNUAL WELLNESS SUBSEQUENT VISIT: ICD-10-PCS | Mod: S$GLB,,, | Performed by: PHYSICIAN ASSISTANT

## 2023-03-07 PROCEDURE — 1159F PR MEDICATION LIST DOCUMENTED IN MEDICAL RECORD: ICD-10-PCS | Mod: CPTII,S$GLB,, | Performed by: PHYSICIAN ASSISTANT

## 2023-03-07 PROCEDURE — 1160F RVW MEDS BY RX/DR IN RCRD: CPT | Mod: CPTII,S$GLB,, | Performed by: PHYSICIAN ASSISTANT

## 2023-03-07 PROCEDURE — 36415 COLL VENOUS BLD VENIPUNCTURE: CPT | Mod: PO | Performed by: PHYSICIAN ASSISTANT

## 2023-03-07 PROCEDURE — 1170F PR FUNCTIONAL STATUS ASSESSED: ICD-10-PCS | Mod: CPTII,S$GLB,, | Performed by: PHYSICIAN ASSISTANT

## 2023-03-07 PROCEDURE — 3078F PR MOST RECENT DIASTOLIC BLOOD PRESSURE < 80 MM HG: ICD-10-PCS | Mod: CPTII,S$GLB,, | Performed by: PHYSICIAN ASSISTANT

## 2023-03-07 NOTE — PROGRESS NOTES
"  Bj Pate presented for a  Medicare AWV and comprehensive Health Risk Assessment today. The following components were reviewed and updated:    Medical history  Family History  Social history  Allergies and Current Medications  Health Risk Assessment  Health Maintenance  Care Team         ** See Completed Assessments for Annual Wellness Visit within the encounter summary.**         The following assessments were completed:  Living Situation  CAGE  Depression Screening  Timed Get Up and Go  Whisper Test  Cognitive Function Screening  Nutrition Screening  ADL Screening  PAQ Screening        Vitals:    03/07/23 0959 03/07/23 1038   BP: 139/78 104/64   BP Location: Right arm    Patient Position: Sitting    BP Method: X-Large (Manual)    Pulse: 91    Resp: 17    Temp: 98.3 °F (36.8 °C)    TempSrc: Oral    SpO2: 98%    Weight: 131.6 kg (290 lb 2 oz)    Height: 5' 6" (1.676 m)      Body mass index is 46.83 kg/m².  Physical Exam          Diagnoses and health risks identified today and associated recommendations/orders:    1. Encounter for preventive health examination  Provided Bj with a 5-10 year written screening schedule and personal prevention plan. Recommendations were developed using the USPSTF age appropriate recommendations. Education, counseling, and referrals were provided as needed. After Visit Summary printed and given to patient which includes a list of additional screenings\tests needed.    2. Abnormality of gait and mobility      3. Other reduced mobility      4. Chronic kidney disease, stage 3a  Stable improved, no nsaids    5. History of CVA (cerebrovascular accident)  stable    6. Morbid obesity with BMI of 45.0-49.9, adult  Needs exercise  diet    7. Prediabetes  - Hemoglobin A1C; Future    8. Colon cancer screening  - Fecal Immunochemical Test (iFOBT); Future    9. Aortic root dilatation  Needs f/u with cardiology    10. Pulmonary hypertension  Stable monitor by sleep med      Review for Opioid " Screening: Patient has rx for opioid for recent er visit    Review for Substance Use Disorders: uses prn for acute issue      No follow-ups on file.    Jaida Juarez PA-C  I offered to discuss advanced care planning, including how to pick a person who would make decisions for you if you were unable to make them for yourself, called a health care power of , and what kind of decisions you might make such as use of life sustaining treatments such as ventilators and tube feeding when faced with a life limiting illness recorded on a living will that they will need to know. (How you want to be cared for as you near the end of your natural life)     X Patient is interested in learning more about how to make advanced directives.  I provided them paperwork and offered to discuss this with them.

## 2023-03-07 NOTE — PATIENT INSTRUCTIONS
Counseling and Referral of Other Preventative  (Italic type indicates deductible and co-insurance are waived)    Patient Name: Bj Pate  Today's Date: 3/7/2023    Health Maintenance       Date Due Completion Date    Shingles Vaccine (1 of 2) Never done ---    Colorectal Cancer Screening 07/22/2022 7/22/2021    Hemoglobin A1c (Prediabetes) 03/11/2023 3/11/2022    High Dose Statin 01/23/2024 1/23/2023    TETANUS VACCINE 02/19/2026 2/19/2016    Lipid Panel 03/11/2027 3/11/2022    Override on 8/22/2014: Done        No orders of the defined types were placed in this encounter.      The following information is provided to all patients.  This information is to help you find resources for any of the problems found today that may be affecting your health:                Living healthy guide: www.[facility].[place].gov      Understanding Diabetes: www.diabetes.org      Eating healthy: www.cdc.gov/healthyweight      [facility] home safety checklist: www.cdc.gov/steadi/patient.html      Agency on Aging: www.goea.[place].[facility]      Alcoholics anonymous (AA): www.aa.org      Physical Activity: www.tom.nih.gov/ac4msli      Tobacco use: www.quitwithusla.org

## 2023-03-16 ENCOUNTER — OFFICE VISIT (OUTPATIENT)
Dept: FAMILY MEDICINE | Facility: CLINIC | Age: 73
End: 2023-03-16
Payer: MEDICARE

## 2023-03-16 VITALS
BODY MASS INDEX: 49.96 KG/M2 | OXYGEN SATURATION: 99 % | RESPIRATION RATE: 20 BRPM | HEIGHT: 66 IN | TEMPERATURE: 99 F | DIASTOLIC BLOOD PRESSURE: 74 MMHG | SYSTOLIC BLOOD PRESSURE: 123 MMHG | HEART RATE: 92 BPM | WEIGHT: 310.88 LBS

## 2023-03-16 DIAGNOSIS — N40.0 BENIGN PROSTATIC HYPERPLASIA WITHOUT LOWER URINARY TRACT SYMPTOMS: ICD-10-CM

## 2023-03-16 DIAGNOSIS — G56.23 ULNAR NEUROPATHY OF BOTH UPPER EXTREMITIES: ICD-10-CM

## 2023-03-16 DIAGNOSIS — R73.03 PREDIABETES: ICD-10-CM

## 2023-03-16 DIAGNOSIS — M51.36 DDD (DEGENERATIVE DISC DISEASE), LUMBAR: ICD-10-CM

## 2023-03-16 DIAGNOSIS — Z12.12 ENCOUNTER FOR COLORECTAL CANCER SCREENING: ICD-10-CM

## 2023-03-16 DIAGNOSIS — I10 ESSENTIAL HYPERTENSION: ICD-10-CM

## 2023-03-16 DIAGNOSIS — M50.30 DDD (DEGENERATIVE DISC DISEASE), CERVICAL: ICD-10-CM

## 2023-03-16 DIAGNOSIS — M47.22 OSTEOARTHRITIS OF SPINE WITH RADICULOPATHY, CERVICAL REGION: ICD-10-CM

## 2023-03-16 DIAGNOSIS — Z12.11 ENCOUNTER FOR COLORECTAL CANCER SCREENING: ICD-10-CM

## 2023-03-16 DIAGNOSIS — Z00.00 ANNUAL PHYSICAL EXAM: Primary | ICD-10-CM

## 2023-03-16 DIAGNOSIS — Z86.73 HISTORY OF CVA (CEREBROVASCULAR ACCIDENT): ICD-10-CM

## 2023-03-16 DIAGNOSIS — N18.31 CHRONIC KIDNEY DISEASE, STAGE 3A: ICD-10-CM

## 2023-03-16 DIAGNOSIS — M54.12 CERVICAL RADICULOPATHY: ICD-10-CM

## 2023-03-16 DIAGNOSIS — J44.9 COPD, MODERATE: ICD-10-CM

## 2023-03-16 DIAGNOSIS — M1A.0320 CHRONIC GOUT OF LEFT WRIST, UNSPECIFIED CAUSE: ICD-10-CM

## 2023-03-16 DIAGNOSIS — E78.5 HYPERLIPIDEMIA, ACQUIRED: ICD-10-CM

## 2023-03-16 DIAGNOSIS — M1A.09X1 IDIOPATHIC CHRONIC GOUT OF MULTIPLE SITES WITH TOPHUS: ICD-10-CM

## 2023-03-16 PROCEDURE — 3074F PR MOST RECENT SYSTOLIC BLOOD PRESSURE < 130 MM HG: ICD-10-PCS | Mod: CPTII,S$GLB,, | Performed by: FAMILY MEDICINE

## 2023-03-16 PROCEDURE — 3078F PR MOST RECENT DIASTOLIC BLOOD PRESSURE < 80 MM HG: ICD-10-PCS | Mod: CPTII,S$GLB,, | Performed by: FAMILY MEDICINE

## 2023-03-16 PROCEDURE — 1101F PT FALLS ASSESS-DOCD LE1/YR: CPT | Mod: CPTII,S$GLB,, | Performed by: FAMILY MEDICINE

## 2023-03-16 PROCEDURE — 1101F PR PT FALLS ASSESS DOC 0-1 FALLS W/OUT INJ PAST YR: ICD-10-PCS | Mod: CPTII,S$GLB,, | Performed by: FAMILY MEDICINE

## 2023-03-16 PROCEDURE — 3044F HG A1C LEVEL LT 7.0%: CPT | Mod: CPTII,S$GLB,, | Performed by: FAMILY MEDICINE

## 2023-03-16 PROCEDURE — 3288F FALL RISK ASSESSMENT DOCD: CPT | Mod: CPTII,S$GLB,, | Performed by: FAMILY MEDICINE

## 2023-03-16 PROCEDURE — 3008F PR BODY MASS INDEX (BMI) DOCUMENTED: ICD-10-PCS | Mod: CPTII,S$GLB,, | Performed by: FAMILY MEDICINE

## 2023-03-16 PROCEDURE — 99999 PR PBB SHADOW E&M-EST. PATIENT-LVL III: CPT | Mod: PBBFAC,,, | Performed by: FAMILY MEDICINE

## 2023-03-16 PROCEDURE — 3008F BODY MASS INDEX DOCD: CPT | Mod: CPTII,S$GLB,, | Performed by: FAMILY MEDICINE

## 2023-03-16 PROCEDURE — 3044F PR MOST RECENT HEMOGLOBIN A1C LEVEL <7.0%: ICD-10-PCS | Mod: CPTII,S$GLB,, | Performed by: FAMILY MEDICINE

## 2023-03-16 PROCEDURE — 99397 PER PM REEVAL EST PAT 65+ YR: CPT | Mod: GZ,S$GLB,, | Performed by: FAMILY MEDICINE

## 2023-03-16 PROCEDURE — 1125F PR PAIN SEVERITY QUANTIFIED, PAIN PRESENT: ICD-10-PCS | Mod: CPTII,S$GLB,, | Performed by: FAMILY MEDICINE

## 2023-03-16 PROCEDURE — 3288F PR FALLS RISK ASSESSMENT DOCUMENTED: ICD-10-PCS | Mod: CPTII,S$GLB,, | Performed by: FAMILY MEDICINE

## 2023-03-16 PROCEDURE — 3074F SYST BP LT 130 MM HG: CPT | Mod: CPTII,S$GLB,, | Performed by: FAMILY MEDICINE

## 2023-03-16 PROCEDURE — 1125F AMNT PAIN NOTED PAIN PRSNT: CPT | Mod: CPTII,S$GLB,, | Performed by: FAMILY MEDICINE

## 2023-03-16 PROCEDURE — 99999 PR PBB SHADOW E&M-EST. PATIENT-LVL III: ICD-10-PCS | Mod: PBBFAC,,, | Performed by: FAMILY MEDICINE

## 2023-03-16 PROCEDURE — 99397 PR PREVENTIVE VISIT,EST,65 & OVER: ICD-10-PCS | Mod: GZ,S$GLB,, | Performed by: FAMILY MEDICINE

## 2023-03-16 PROCEDURE — 3078F DIAST BP <80 MM HG: CPT | Mod: CPTII,S$GLB,, | Performed by: FAMILY MEDICINE

## 2023-03-16 RX ORDER — ATORVASTATIN CALCIUM 40 MG/1
40 TABLET, FILM COATED ORAL DAILY
Qty: 90 TABLET | Refills: 1 | Status: SHIPPED | OUTPATIENT
Start: 2023-03-16 | End: 2023-09-06 | Stop reason: SDUPTHER

## 2023-03-16 RX ORDER — HYDROCHLOROTHIAZIDE 25 MG/1
25 TABLET ORAL DAILY
Qty: 90 TABLET | Refills: 3 | Status: SHIPPED | OUTPATIENT
Start: 2023-03-16 | End: 2023-11-03

## 2023-03-16 RX ORDER — COLCHICINE 0.6 MG/1
0.6 TABLET ORAL DAILY PRN
Qty: 90 TABLET | Refills: 2 | Status: SHIPPED | OUTPATIENT
Start: 2023-03-16 | End: 2023-12-11

## 2023-03-16 RX ORDER — GABAPENTIN 600 MG/1
600 TABLET ORAL 3 TIMES DAILY
Qty: 90 TABLET | Refills: 9 | Status: SHIPPED | OUTPATIENT
Start: 2023-03-16 | End: 2023-09-06 | Stop reason: SDUPTHER

## 2023-03-16 RX ORDER — DILTIAZEM HYDROCHLORIDE 240 MG/1
240 CAPSULE, COATED, EXTENDED RELEASE ORAL DAILY
Qty: 90 CAPSULE | Refills: 2 | Status: SHIPPED | OUTPATIENT
Start: 2023-03-16 | End: 2023-06-23 | Stop reason: SDUPTHER

## 2023-03-16 RX ORDER — FLUTICASONE PROPIONATE AND SALMETEROL 500; 50 UG/1; UG/1
1 POWDER RESPIRATORY (INHALATION) 2 TIMES DAILY
Qty: 60 EACH | Refills: 11 | Status: SHIPPED | OUTPATIENT
Start: 2023-03-16 | End: 2024-03-15

## 2023-03-16 RX ORDER — ALBUTEROL SULFATE 90 UG/1
2 AEROSOL, METERED RESPIRATORY (INHALATION) EVERY 6 HOURS PRN
Qty: 18 G | Refills: 5 | Status: SHIPPED | OUTPATIENT
Start: 2023-03-16 | End: 2023-12-11

## 2023-03-16 RX ORDER — TIZANIDINE 4 MG/1
4 TABLET ORAL NIGHTLY PRN
Qty: 90 TABLET | Refills: 1 | Status: SHIPPED | OUTPATIENT
Start: 2023-03-16 | End: 2023-05-15 | Stop reason: SDUPTHER

## 2023-03-16 RX ORDER — TAMSULOSIN HYDROCHLORIDE 0.4 MG/1
0.4 CAPSULE ORAL DAILY
Qty: 90 CAPSULE | Refills: 3 | Status: SHIPPED | OUTPATIENT
Start: 2023-03-16 | End: 2023-05-16 | Stop reason: SDUPTHER

## 2023-03-16 NOTE — PROGRESS NOTES
Health Maintenance Due   Topic     Shingles Vaccine (1 of 2) Hx of chickenpox. Notified pt can get vaccine at pharmacy.    Colorectal Cancer Screening  Information given

## 2023-04-03 ENCOUNTER — TELEPHONE (OUTPATIENT)
Dept: FAMILY MEDICINE | Facility: CLINIC | Age: 73
End: 2023-04-03
Payer: MEDICARE

## 2023-04-03 NOTE — TELEPHONE ENCOUNTER
----- Message from Marjorie Cameron sent at 3/31/2023  4:16 PM CDT -----   Name of Who is Calling:     What is the request in detail:  patient request call back in reference to pain in right elbow to shoulder Please contact to further discuss and advise      Can the clinic reply by [facility]:     What Number to Call Back if not in [facility]:  [phone]

## 2023-04-03 NOTE — TELEPHONE ENCOUNTER
Left voicemail for patient to return call to clinic to schedule with any available provider for his arm pain.

## 2023-04-03 NOTE — TELEPHONE ENCOUNTER
----- Message from Марина Brooks sent at 4/3/2023  1:19 PM CDT -----  Regarding: Returning Call  .Type:  Patient Returning Call    Who Called: self    Who Left Message for Patient: Lisa Woods,     Does the patient know what this is regarding?: arm pain, patient called to schedule with any provider but theres not coming up in [facility] until May    Would the patient rather a call back or a response via [facility]? call    Best Call Back Number: .[phone]     Additional Information:

## 2023-04-03 NOTE — PROGRESS NOTES
Subjective:       Patient ID: Bj Pate Jr. is a 73 y.o. male.    Chief Complaint: No chief complaint on file.      HPI  73-year-old male presents for annual exam.  States he is doing well overall.  States he has takes his medications.  States he may need refills.        Review of Systems   Constitutional: Negative.    HENT: Negative.     Respiratory: Negative.     Cardiovascular: Negative.    Gastrointestinal: Negative.    Endocrine: Negative.    Genitourinary: Negative.    Musculoskeletal: Negative.    Neurological: Negative.    Psychiatric/Behavioral: Negative.          Past Medical History:   Diagnosis Date    Anemia 06/04/2021    Anticoagulant long-term use     Basal ganglia hemorrhage     Left basal ganglia hemorrhage with resultant right-sided hemiparesis which has resolved.     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      Past Surgical History:   Procedure Laterality Date    CARPAL TUNNEL RELEASE Left 2/19/2020    Procedure: RELEASE, CARPAL TUNNEL LEFT;  Surgeon: Maria Luisa Mccurdy MD;  Location: [facility] OR;  Service: Orthopedics;  Laterality: Left;    EPIDURAL STEROID INJECTION N/A 5/2/2019    Procedure: Injection, Steroid, Epidural Cervical;  Surgeon: Dariel Doan Jr., MD;  Location: [facility];  Service: Pain Management;  Laterality: N/A;  Cervical Epidural Steroid Injection     [id number]    Arrive @ 1130; ASA; Check BG    EPIDURAL STEROID INJECTION Bilateral 5/29/2019     Procedure: Lumbar Medial Branch Blocks;  Surgeon: Dariel Doan Jr., MD;  Location: [facility] ENDO;  Service: Pain Management;  Laterality: Bilateral;  Bilateral Lumbar Medial Branch Blocks L3, L4, L5    [id number]  [id number]    Attive @ 1030 (11 arrival request); NO Sedation; ASA; Check BG    EPIDURAL STEROID INJECTION Bilateral 7/3/2019    Procedure: Lumbar Medial Branch Blocks;  Surgeon: Dariel Doan Jr., MD;  Location: [facility] ENDO;  Service: Pain Management;  Laterality: Bilateral;  Bilateral Lumbar Medial Branch Blocks L3, L4, L5    [id number]  [id number]    Arrive @ 0930; NO Sedation; ASA; Check BG    EPIDURAL STEROID INJECTION N/A 8/7/2019    Procedure: Injection, Steroid, Epidural Cervical;  Surgeon: Dariel Doan Jr., MD;  Location: [facility] ENDO;  Service: Pain Management;  Laterality: N/A;  Cervical Epidural Steroid Injection C7-T1    [id number]    Arrive @ 1115; Last ASA 7/30; Check BG    LEFT HEART CATHETERIZATION Left 9/21/2021    Procedure: Left heart cath 9am start, R rad access;  Surgeon: Dariel Conner MD;  Location: [facility] CATH LAB;  Service: Cardiology;  Laterality: Left;  RN PRE OP Covid NEGATIVE ON  9-20-21.  C A    NO PAST SURGERIES       Family History   Problem Relation Age of Onset    No Known Problems Mother     No Known Problems Father     Hypertension Unknown     Diabetes Unknown     Diabetes Paternal Grandfather     Heart disease Paternal Grandfather     No Known Problems Sister     No Known Problems Brother     No Known Problems Maternal Aunt     No Known Problems Maternal Uncle     No Known Problems Paternal Aunt     No Known Problems Paternal Uncle     No Known Problems Maternal Grandmother     No Known Problems Maternal Grandfather     No Known Problems Paternal Grandmother     Amblyopia Neg Hx     Blindness Neg Hx     Cancer Neg Hx     Cataracts Neg Hx     Glaucoma Neg Hx     Macular degeneration Neg Hx     Retinal detachment Neg Hx     Strabismus Neg Hx     Stroke Neg Hx     Thyroid disease  Neg Hx      Social History     Socioeconomic History    Marital status:     Number of children: 8    Highest education level: 11th grade   Occupational History    Occupation:    Tobacco Use    Smoking status: Never    Smokeless tobacco: Never   Substance and Sexual Activity    Alcohol use: Yes     Alcohol/week: 0.0 standard drinks     Comment: occacionally    Drug use: No    Sexual activity: Not Currently     Social Determinants of Health     Financial Resource Strain: Low Risk     Difficulty of Paying Living Expenses: Not very hard   Food Insecurity: No Food Insecurity    Worried About Running Out of Food in the Last Year: Never true    Ran Out of Food in the Last Year: Never true   Transportation Needs: No Transportation Needs    Lack of Transportation (Medical): No    Lack of Transportation (Non-Medical): No   Physical Activity: Inactive    Days of Exercise per Week: 0 days    Minutes of Exercise per Session: 0 min   Stress: No Stress Concern Present    Feeling of Stress : Not at all   Social Connections: Socially Isolated    Frequency of Communication with Friends and Family: More than three times a week    Frequency of Social Gatherings with Friends and Family: More than three times a week    Attends Yazidi Services: Never    Active Member of Clubs or Organizations: No    Attends Club or Organization Meetings: Never    Marital Status:    Housing Stability: Unknown    Unable to Pay for Housing in the Last Year: No    Unstable Housing in the Last Year: No       Current Outpatient Medications:     acetaminophen (TYLENOL) 325 MG tablet, Take 2 tablets (650 mg total) by mouth every 6 (six) hours as needed for Pain or Temperature greater than (100.3)., Disp: 30 tablet, Rfl: 0    albuterol (PROVENTIL/VENTOLIN HFA) 90 mcg/actuation inhaler, Inhale 2 puffs into the lungs every 6 (six) hours as needed for Wheezing. Rescue, Disp: 18 g, Rfl: 5    ascorbic acid, vitamin C, (VITAMIN C) 500 MG  tablet, Take 500 mg by mouth once daily., Disp: , Rfl:     aspirin (ECOTRIN) 81 MG EC tablet, Take 81 mg by mouth once daily., Disp: , Rfl:     atorvastatin (LIPITOR) 40 MG tablet, Take 1 tablet (40 mg total) by mouth once daily., Disp: 90 tablet, Rfl: 1    blood pressure test kit-large Kit, 1 Device by Misc.(Non-Drug; Combo Route) route 2 (two) times daily., Disp: 1 each, Rfl: 0    blood sugar diagnostic Strp, To check BG 2 times daily, to use with insurance preferred meter, Disp: 200 strip, Rfl: 3    blood-glucose meter kit, To check BG 2 times daily, to use with insurance preferred meter, Disp: 1 each, Rfl: 0    chlorhexidine (PERIDEX) 0.12 % solution, Use as directed 15 mLs in the mouth or throat 2 (two) times daily., Disp: , Rfl:     colchicine (COLCRYS) 0.6 mg tablet, Take 1 tablet (0.6 mg total) by mouth daily as needed., Disp: 90 tablet, Rfl: 2    diltiaZEM (CARDIZEM CD) 240 MG 24 hr capsule, Take 1 capsule (240 mg total) by mouth once daily., Disp: 90 capsule, Rfl: 2    fluticasone-salmeterol diskus inhaler 500-50 mcg, Inhale 1 puff into the lungs 2 (two) times daily. Controller, Disp: 60 each, Rfl: 11    gabapentin (NEURONTIN) 600 MG tablet, Take 1 tablet (600 mg total) by mouth 3 (three) times daily., Disp: 90 tablet, Rfl: 9    hydroCHLOROthiazide (HYDRODIURIL) 25 MG tablet, Take 1 tablet (25 mg total) by mouth once daily., Disp: 90 tablet, Rfl: 3    HYDROcodone-acetaminophen (NORCO) 5-325 mg per tablet, Take 1 tablet by mouth every 6 (six) hours as needed for Pain., Disp: 20 tablet, Rfl: 0    lancets Misc, To check BG 2 times daily, to use with insurance preferred meter, Disp: 200 each, Rfl: 3    PREVIDENT 5000 SENSITIVE 1.1-5 % Pste, use as directed, Disp: , Rfl:     tamsulosin (FLOMAX) 0.4 mg Cap, Take 1 capsule (0.4 mg total) by mouth once daily., Disp: 90 capsule, Rfl: 3    tiZANidine (ZANAFLEX) 4 MG tablet, Take 1 tablet (4 mg total) by mouth nightly as needed., Disp: 90 tablet, Rfl: 1    vitamin D  "(VITAMIN D3) 1000 units Tab, Take 1,000 Units by mouth once daily., Disp: , Rfl:    Objective:      Vitals:    03/16/23 1505 03/16/23 1510   BP: (!) 148/82 123/74   BP Location: Left arm Left arm   Patient Position: Sitting Sitting   BP Method: Large (Automatic) Large (Automatic)   Pulse: 92    Resp: 20    Temp: 98.6 °F (37 °C)    TempSrc: Oral    SpO2: 99%    Weight: (!) 141 kg (310 lb 13.6 oz)    Height: 5' 6" (1.676 m)        Physical Exam  Constitutional:       General: He is not in acute distress.  HENT:      Head: Normocephalic and atraumatic.   Eyes:      Conjunctiva/sclera: Conjunctivae normal.   Cardiovascular:      Rate and Rhythm: Normal rate and regular rhythm.      Heart sounds: Normal heart sounds. No murmur heard.    No friction rub. No gallop.   Pulmonary:      Effort: Pulmonary effort is normal.      Breath sounds: Normal breath sounds. No wheezing or rales.   Musculoskeletal:      Cervical back: Neck supple.   Skin:     General: Skin is warm and dry.   Neurological:      Mental Status: He is alert and oriented to person, place, and time.   Psychiatric:         Behavior: Behavior normal.         Thought Content: Thought content normal.         Judgment: Judgment normal.          Assessment:       1. Annual physical exam    2. Encounter for colorectal cancer screening    3. Hyperlipidemia, acquired    4. Essential hypertension    5. Prediabetes    6. Cervical radiculopathy    7. DDD (degenerative disc disease), cervical    8. DDD (degenerative disc disease), lumbar    9. Benign prostatic hyperplasia without lower urinary tract symptoms    10. Osteoarthritis of spine with radiculopathy, cervical region    11. Ulnar neuropathy of both upper extremities    12. COPD, moderate    13. Idiopathic chronic gout of multiple sites with tophus    14. Chronic gout of left wrist, unspecified cause    15. History of CVA (cerebrovascular accident)    16. Chronic kidney disease, stage 3a        Plan:       Annual " physical exam    Encounter for colorectal cancer screening  -     Ambulatory referral/consult to Endo Procedure ; Future; Expected date: 03/17/2023    Hyperlipidemia, acquired  -     Lipid Panel; Future; Expected date: 03/16/2023  -     atorvastatin (LIPITOR) 40 MG tablet; Take 1 tablet (40 mg total) by mouth once daily.  Dispense: 90 tablet; Refill: 1    Essential hypertension  -     Comprehensive Metabolic Panel; Future; Expected date: 03/16/2023  -     hydroCHLOROthiazide (HYDRODIURIL) 25 MG tablet; Take 1 tablet (25 mg total) by mouth once daily.  Dispense: 90 tablet; Refill: 3  -     diltiaZEM (CARDIZEM CD) 240 MG 24 hr capsule; Take 1 capsule (240 mg total) by mouth once daily.  Dispense: 90 capsule; Refill: 2    Prediabetes  -     Hemoglobin A1C; Future; Expected date: 03/16/2023    Cervical radiculopathy  -     tiZANidine (ZANAFLEX) 4 MG tablet; Take 1 tablet (4 mg total) by mouth nightly as needed.  Dispense: 90 tablet; Refill: 1    DDD (degenerative disc disease), cervical  -     tiZANidine (ZANAFLEX) 4 MG tablet; Take 1 tablet (4 mg total) by mouth nightly as needed.  Dispense: 90 tablet; Refill: 1    DDD (degenerative disc disease), lumbar  -     tiZANidine (ZANAFLEX) 4 MG tablet; Take 1 tablet (4 mg total) by mouth nightly as needed.  Dispense: 90 tablet; Refill: 1    Benign prostatic hyperplasia without lower urinary tract symptoms  -     tamsulosin (FLOMAX) 0.4 mg Cap; Take 1 capsule (0.4 mg total) by mouth once daily.  Dispense: 90 capsule; Refill: 3    Osteoarthritis of spine with radiculopathy, cervical region  -     gabapentin (NEURONTIN) 600 MG tablet; Take 1 tablet (600 mg total) by mouth 3 (three) times daily.  Dispense: 90 tablet; Refill: 9    Ulnar neuropathy of both upper extremities  -     gabapentin (NEURONTIN) 600 MG tablet; Take 1 tablet (600 mg total) by mouth 3 (three) times daily.  Dispense: 90 tablet; Refill: 9    COPD, moderate  -     fluticasone-salmeterol diskus inhaler  500-50 mcg; Inhale 1 puff into the lungs 2 (two) times daily. Controller  Dispense: 60 each; Refill: 11  -     albuterol (PROVENTIL/VENTOLIN HFA) 90 mcg/actuation inhaler; Inhale 2 puffs into the lungs every 6 (six) hours as needed for Wheezing. Rescue  Dispense: 18 g; Refill: 5    Idiopathic chronic gout of multiple sites with tophus  -     colchicine (COLCRYS) 0.6 mg tablet; Take 1 tablet (0.6 mg total) by mouth daily as needed.  Dispense: 90 tablet; Refill: 2    Chronic gout of left wrist, unspecified cause    History of CVA (cerebrovascular accident)    Chronic kidney disease, stage 3a    Continue meds.  Pre diabetes improved.          Future Appointments   Date Time Provider Department Center   9/18/2023  8:00 AM LAB, [facility] [place]   9/21/2023  3:00 PM Jose Antonio Foster MD [facility]       Patient note was created using Sara.  Any errors in syntax or even information may not have been identified and edited on initial review prior to signing this note.

## 2023-04-06 ENCOUNTER — TELEPHONE (OUTPATIENT)
Dept: FAMILY MEDICINE | Facility: CLINIC | Age: 73
End: 2023-04-06
Payer: MEDICARE

## 2023-04-06 ENCOUNTER — HOSPITAL ENCOUNTER (EMERGENCY)
Facility: HOSPITAL | Age: 73
Discharge: HOME | End: 2023-04-06
Attending: INTERNAL MEDICINE
Payer: MEDICARE

## 2023-04-06 VITALS
OXYGEN SATURATION: 99 % | SYSTOLIC BLOOD PRESSURE: 146 MMHG | HEART RATE: 76 BPM | TEMPERATURE: 98 F | DIASTOLIC BLOOD PRESSURE: 74 MMHG | RESPIRATION RATE: 22 BRPM | HEIGHT: 69 IN | BODY MASS INDEX: 46.65 KG/M2 | WEIGHT: 315 LBS

## 2023-04-06 DIAGNOSIS — K52.9 COLITIS: Primary | ICD-10-CM

## 2023-04-06 LAB
ALBUMIN SERPL BCP-MCNC: 3.1 G/DL (ref 3.5–5.2)
ALP SERPL-CCNC: 85 U/L (ref 55–135)
ALT SERPL W/O P-5'-P-CCNC: 16 U/L (ref 10–44)
ANION GAP SERPL CALC-SCNC: 15 MMOL/L (ref 8–16)
AST SERPL-CCNC: 23 U/L (ref 10–40)
BACTERIA #/AREA URNS HPF: ABNORMAL /HPF
BASOPHILS # BLD AUTO: 0.03 K/UL (ref 0–0.2)
BASOPHILS NFR BLD: 0.4 % (ref 0–1.9)
BILIRUB SERPL-MCNC: 0.4 MG/DL (ref 0.1–1)
BILIRUB UR QL STRIP: NEGATIVE
BUN SERPL-MCNC: 12 MG/DL (ref 8–23)
CALCIUM SERPL-MCNC: 9 MG/DL (ref 8.7–10.5)
CHLORIDE SERPL-SCNC: 96 MMOL/L (ref 95–110)
CLARITY UR: CLEAR
CO2 SERPL-SCNC: 25 MMOL/L (ref 23–29)
COLOR UR: YELLOW
CREAT SERPL-MCNC: 1.4 MG/DL (ref 0.5–1.4)
DIFFERENTIAL METHOD: ABNORMAL
EOSINOPHIL # BLD AUTO: 0.2 K/UL (ref 0–0.5)
EOSINOPHIL NFR BLD: 2.2 % (ref 0–8)
ERYTHROCYTE [DISTWIDTH] IN BLOOD BY AUTOMATED COUNT: 14.6 % (ref 11.5–14.5)
EST. GFR  (NO RACE VARIABLE): 54 ML/MIN/1.73 M^2
GLUCOSE SERPL-MCNC: 100 MG/DL (ref 70–110)
GLUCOSE UR QL STRIP: NEGATIVE
HCT VFR BLD AUTO: 39.2 % (ref 40–54)
HGB BLD-MCNC: 13 G/DL (ref 14–18)
HGB UR QL STRIP: NEGATIVE
HYALINE CASTS #/AREA URNS LPF: 16 /LPF
IMM GRANULOCYTES # BLD AUTO: 0.07 K/UL (ref 0–0.04)
IMM GRANULOCYTES NFR BLD AUTO: 1 % (ref 0–0.5)
KETONES UR QL STRIP: NEGATIVE
LEUKOCYTE ESTERASE UR QL STRIP: ABNORMAL
LYMPHOCYTES # BLD AUTO: 1.5 K/UL (ref 1–4.8)
LYMPHOCYTES NFR BLD: 21.9 % (ref 18–48)
MCH RBC QN AUTO: 28.8 PG (ref 27–31)
MCHC RBC AUTO-ENTMCNC: 33.2 G/DL (ref 32–36)
MCV RBC AUTO: 87 FL (ref 82–98)
MICROSCOPIC COMMENT: ABNORMAL
MONOCYTES # BLD AUTO: 0.9 K/UL (ref 0.3–1)
MONOCYTES NFR BLD: 13 % (ref 4–15)
NEUTROPHILS # BLD AUTO: 4.2 K/UL (ref 1.8–7.7)
NEUTROPHILS NFR BLD: 61.5 % (ref 38–73)
NITRITE UR QL STRIP: NEGATIVE
NRBC BLD-RTO: 0 /100 WBC
PH UR STRIP: 6 [PH] (ref 5–8)
PLATELET # BLD AUTO: 316 K/UL (ref 150–450)
PMV BLD AUTO: 9.7 FL (ref 9.2–12.9)
POTASSIUM SERPL-SCNC: 3.8 MMOL/L (ref 3.5–5.1)
PROT SERPL-MCNC: 8 G/DL (ref 6–8.4)
PROT UR QL STRIP: ABNORMAL
RBC # BLD AUTO: 4.51 M/UL (ref 4.6–6.2)
RBC #/AREA URNS HPF: 1 /HPF (ref 0–4)
SODIUM SERPL-SCNC: 136 MMOL/L (ref 136–145)
SP GR UR STRIP: 1.02 (ref 1–1.03)
SQUAMOUS #/AREA URNS HPF: 0 /HPF
URN SPEC COLLECT METH UR: ABNORMAL
UROBILINOGEN UR STRIP-ACNC: ABNORMAL EU/DL
WBC # BLD AUTO: 6.85 K/UL (ref 3.9–12.7)
WBC #/AREA URNS HPF: 1 /HPF (ref 0–5)

## 2023-04-06 PROCEDURE — 85025 COMPLETE CBC W/AUTO DIFF WBC: CPT | Performed by: INTERNAL MEDICINE

## 2023-04-06 PROCEDURE — 25500020 PHARM REV CODE 255: Performed by: INTERNAL MEDICINE

## 2023-04-06 PROCEDURE — 81000 URINALYSIS NONAUTO W/SCOPE: CPT | Performed by: INTERNAL MEDICINE

## 2023-04-06 PROCEDURE — 99285 EMERGENCY DEPT VISIT HI MDM: CPT | Mod: 25

## 2023-04-06 PROCEDURE — 80053 COMPREHEN METABOLIC PANEL: CPT | Performed by: INTERNAL MEDICINE

## 2023-04-06 PROCEDURE — 25000003 PHARM REV CODE 250: Performed by: INTERNAL MEDICINE

## 2023-04-06 RX ORDER — LIDOCAINE HYDROCHLORIDE 20 MG/ML
15 SOLUTION OROPHARYNGEAL ONCE
Status: COMPLETED | OUTPATIENT
Start: 2023-04-06 | End: 2023-04-06

## 2023-04-06 RX ORDER — ACETAMINOPHEN 500 MG
500 TABLET ORAL
Status: DISCONTINUED | OUTPATIENT
Start: 2023-04-06 | End: 2023-04-06 | Stop reason: HOSPADM

## 2023-04-06 RX ORDER — ACETAMINOPHEN 500 MG
1000 TABLET ORAL
Status: COMPLETED | OUTPATIENT
Start: 2023-04-06 | End: 2023-04-06

## 2023-04-06 RX ORDER — MAG HYDROX/ALUMINUM HYD/SIMETH 200-200-20
30 SUSPENSION, ORAL (FINAL DOSE FORM) ORAL ONCE
Status: COMPLETED | OUTPATIENT
Start: 2023-04-06 | End: 2023-04-06

## 2023-04-06 RX ADMIN — IOHEXOL 100 ML: 350 INJECTION, SOLUTION INTRAVENOUS at 03:04

## 2023-04-06 RX ADMIN — ACETAMINOPHEN 1000 MG: 500 TABLET ORAL at 05:04

## 2023-04-06 RX ADMIN — LIDOCAINE HYDROCHLORIDE 15 ML: 20 SOLUTION ORAL at 05:04

## 2023-04-06 RX ADMIN — ALUMINUM HYDROXIDE, MAGNESIUM HYDROXIDE, AND SIMETHICONE 30 ML: 200; 200; 20 SUSPENSION ORAL at 05:04

## 2023-04-06 NOTE — TELEPHONE ENCOUNTER
LVM advising pt of May being the available with his PCP. Also advise that he may see another provider sooner.

## 2023-04-06 NOTE — TELEPHONE ENCOUNTER
----- Message from Jeet Garcia sent at 4/6/2023  7:51 AM CDT -----  Type:  Sooner Appointment Request    Patient is requesting a sooner appointment.  Patient declined first available appointment listed as well as another facility and provider .  Patient will not accept being placed on the waitlist and is requesting a message be sent to doctor.    Name of Caller: Self     When is the first available appointment? 05/2023    Symptoms: ER F/U     Would the patient rather a call back or a response via [facility]? CALL     Best Call Back Number: [phone]

## 2023-04-06 NOTE — ED PROVIDER NOTES
"Encounter Date: 4/6/2023    SCRIBE #1 NOTE: I, Federico Wilder, am scribing for, and in the presence of,  Jeanmarie Brooks MD. I have scribed the following portions of the note - Other sections scribed: HPI, ROS, PE.     History     Chief Complaint   Patient presents with    Abdominal Pain     Patient reports 2 weeks of abdominal pain just below naval. Denies n/v/d. Reports last BM yesterday.      jB Pate Jr. Is a 73 y.o male with a PMHx of anemia, CKD stage 3, COPD, HLD, stroke, DM type 2, who presents to the ED with complaints of periumbilical abdominal pain beginning 2 weeks ago. Patient reports initial complaints of abdominal pain 2 weeks ago, endorsing his complaints exacerbated tonight and are worse at his periumbilical abdomen. Patient states he "couldn't stand up" due to the pain. Patient notes similar complaints 2 weeks ago where he saw GI. Endorses last BM took place yesterday at 0500, endorsing it was "tight and not normal." No medications taken PTA. No alleviating or exacerbating factors noted. Denies CP, SOB, nausea, vomiting, or other associated symptoms. Allergic to naproxen.    The history is provided by the patient. No  was used.   Review of patient's allergies indicates:   Allergen Reactions    Tomato (solanum lycopersicum) Hives    Naproxen Hives    Shrimp Other (See Comments)     Past Medical History:   Diagnosis Date    Anemia 06/04/2021    Anticoagulant long-term use     Basal ganglia hemorrhage     Left basal ganglia hemorrhage with resultant right-sided hemiparesis which has resolved.     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      Past Surgical History:   Procedure Laterality Date    CARPAL TUNNEL RELEASE Left 2/19/2020    Procedure: RELEASE, CARPAL TUNNEL LEFT;  Surgeon: Maria Luisa Mccurdy MD;  Location: [facility] OR;  Service: Orthopedics;  Laterality: Left;    EPIDURAL STEROID INJECTION N/A 5/2/2019    Procedure: Injection, Steroid, Epidural Cervical;  Surgeon: Dariel Doan Jr., MD;  Location: [facility] ENDO;  Service: Pain Management;  Laterality: N/A;  Cervical Epidural Steroid Injection     72971    Arrive @ 1130; ASA; Check BG    EPIDURAL STEROID INJECTION Bilateral 5/29/2019    Procedure: Lumbar Medial Branch Blocks;  Surgeon: Dariel Doan Jr., MD;  Location: [facility];  Service: Pain Management;  Laterality: Bilateral;  Bilateral Lumbar Medial Branch Blocks L3, L4, L5    55861  69690    Attive @ 1030 (11 arrival request); NO Sedation; ASA; Check BG    EPIDURAL STEROID INJECTION Bilateral 7/3/2019    Procedure: Lumbar Medial Branch Blocks;  Surgeon: Dariel Doan Jr., MD;  Location: [facility] ENDO;  Service: Pain Management;  Laterality: Bilateral;  Bilateral Lumbar Medial Branch Blocks L3, L4, L5    51308  16640    Arrive @ 0930; NO Sedation; ASA; Check BG    EPIDURAL STEROID INJECTION N/A 8/7/2019    Procedure: Injection, Steroid, Epidural Cervical;  Surgeon: Dariel Doan Jr., MD;  Location: [facility] ENDO;  Service: Pain Management;  Laterality: N/A;  Cervical Epidural Steroid Injection C7-T1    49262    Arrive @ 1115; Last ASA 7/30; Check BG    LEFT HEART CATHETERIZATION Left 9/21/2021    Procedure: Left heart cath 9am start, R rad access;  Surgeon: Dariel Conner MD;  Location: [facility] CATH LAB;  Service: Cardiology;  Laterality: Left;  RN PRE OP Covid NEGATIVE ON  9-20-21.  C A    NO PAST SURGERIES       Family History   Problem  Relation Age of Onset    No Known Problems Mother     No Known Problems Father     Hypertension Unknown     Diabetes Unknown     Diabetes Paternal Grandfather     Heart disease Paternal Grandfather     No Known Problems Sister     No Known Problems Brother     No Known Problems Maternal Aunt     No Known Problems Maternal Uncle     No Known Problems Paternal Aunt     No Known Problems Paternal Uncle     No Known Problems Maternal Grandmother     No Known Problems Maternal Grandfather     No Known Problems Paternal Grandmother     Amblyopia Neg Hx     Blindness Neg Hx     Cancer Neg Hx     Cataracts Neg Hx     Glaucoma Neg Hx     Macular degeneration Neg Hx     Retinal detachment Neg Hx     Strabismus Neg Hx     Stroke Neg Hx     Thyroid disease Neg Hx      Social History     Tobacco Use    Smoking status: Never    Smokeless tobacco: Never   Substance Use Topics    Alcohol use: Yes     Alcohol/week: 0.0 standard drinks     Comment: occacionally    Drug use: No     Review of Systems   Constitutional:  Negative for fever.   HENT:  Negative for sore throat.    Respiratory:  Negative for shortness of breath.    Cardiovascular:  Negative for chest pain.   Gastrointestinal:  Positive for abdominal pain (periumbilical). Negative for diarrhea, nausea and vomiting.   Genitourinary:  Negative for dysuria.   Musculoskeletal:  Negative for back pain.   Skin:  Negative for rash.   Neurological:  Negative for weakness and headaches.   Psychiatric/Behavioral:  Negative for behavioral problems.    All other systems reviewed and are negative.    Physical Exam     Initial Vitals [04/06/23 0133]   BP Pulse Resp Temp SpO2   (!) 153/81 87 16 98.3 °F (36.8 °C) 99 %      MAP       --         Physical Exam    Nursing note and vitals reviewed.  Constitutional: He appears well-developed and well-nourished. He is Obese .   HENT:   Head: Normocephalic and atraumatic.   Eyes: Conjunctivae are normal.   Neck: Neck supple.   Normal range of  motion.  Cardiovascular:  Normal rate, regular rhythm and normal heart sounds.     Exam reveals no gallop and no friction rub.       No murmur heard.  Pulmonary/Chest: Breath sounds normal. No respiratory distress. He has no wheezes. He has no rhonchi. He has no rales.   Abdominal: Abdomen is soft. Bowel sounds are decreased. There is no abdominal tenderness.   Musculoskeletal:         General: No edema. Normal range of motion.      Cervical back: Normal range of motion and neck supple.     Neurological: He is alert and oriented to person, place, and time. GCS score is 15. GCS eye subscore is 4. GCS verbal subscore is 5. GCS motor subscore is 6.   Skin: Skin is warm and dry.   Psychiatric: He has a normal mood and affect.       ED Course   Procedures  Labs Reviewed   CBC W/ AUTO DIFFERENTIAL - Abnormal; Notable for the following components:       Result Value    RBC 4.51 (*)     Hemoglobin 13.0 (*)     Hematocrit 39.2 (*)     RDW 14.6 (*)     Immature Granulocytes 1.0 (*)     Immature Grans (Abs) 0.07 (*)     All other components within normal limits   COMPREHENSIVE METABOLIC PANEL - Abnormal; Notable for the following components:    Albumin 3.1 (*)     eGFR 54 (*)     All other components within normal limits   URINALYSIS, REFLEX TO URINE CULTURE - Abnormal; Notable for the following components:    Protein, UA 1+ (*)     Urobilinogen, UA 2.0-3.0 (*)     Leukocytes, UA Trace (*)     All other components within normal limits    Narrative:     Specimen Source->Urine   URINALYSIS MICROSCOPIC - Abnormal; Notable for the following components:    Hyaline Casts, UA 16 (*)     All other components within normal limits    Narrative:     Specimen Source->Urine          Imaging Results              CT Abdomen Pelvis With Contrast (Final result)  Result time 04/06/23 04:53:41      Final result by Agueda Brandt MD (04/06/23 04:53:41)                   Impression:      1. Wall thickening of the descending colon, likely on the  basis of nondistention although correlation for colitis advised.  2. Fat containing 2.0 cm right adrenal gland lesion, unchanged from prior CT.  3. Nonspecific perinephric fat stranding which can be seen with underlying renal disease or infectious process.  Correlation with urinalysis advised.  4. Prostatomegaly.  5. Additional findings as above.      Electronically signed by: Agueda Brandt MD  Date:    04/06/2023  Time:    04:53               Narrative:    EXAMINATION:  CT ABDOMEN PELVIS WITH CONTRAST    CLINICAL HISTORY:  RLQ abdominal pain (Age >= 14y);    TECHNIQUE:  Low dose axial images, sagittal and coronal reformations were obtained from the lung bases to the pubic symphysis following the IV administration of 100 mL of Omnipaque 350 .  No oral contrast.    COMPARISON:  None.    FINDINGS:  The visualized lung bases demonstrate which bibasilar atelectatic change and/or scarring.  There are right lower lobe pulmonary blebs.  No pleural fluid or focal consolidation.  The visualized portions of the heart and pericardium demonstrate calcific atherosclerosis of the coronary vessels.    The liver demonstrates no focal abnormality.  The liver is slightly hypoattenuating which can be seen with hepatic steatosis noting evaluation of solid organ parenchyma is slightly limited due to streak artifact from patient body habitus.  Correlation with LFTs advised.  There are no calcified stones in the gallbladder lumen.  The portal vein and splenic vein are patent.  The spleen, pancreas and left adrenal gland are unremarkable.  There is a 2.0 cm fat density right adrenal gland nodule.    The kidneys are normal in size and location without evidence of hydronephrosis.  There is a 1.2 cm exophytic right renal cortical hypodensity in the inferior pole of the right kidney suggestive of a cyst.  There is nonspecific bilateral perinephric fat stranding.  The urinary bladder is suboptimally distended limiting evaluation.  The prostate  "is prominent in size.    The abdominal aorta is nonaneurysmal.  There are shotty periaortic lymph nodes present.  There are prominent iliac chain nodes measuring up to 0.8 cm on the left.    There is wall thickening of the descending colon, which could relate to nondistention although correlation for symptoms of colitis advised.  The appendix is nondilated.  No significant adjacent inflammatory change.  There is no ascites, free intraperitoneal air or portal venous gas.    The visualized osseous structures demonstrate significant degenerative change most notably at the L2-L3 level and at L3-L4 level which appears similar to prior studies.  There is a small fat containing umbilical hernia.                                       Medications   aluminum-magnesium hydroxide-simethicone 200-200-20 mg/5 mL suspension 30 mL (has no administration in time range)     And   LIDOcaine HCl 2% oral solution 15 mL (has no administration in time range)   acetaminophen tablet 1,000 mg (has no administration in time range)   iohexoL (OMNIPAQUE 350) injection 100 mL (100 mLs Intravenous Given 4/6/23 0327)     Medical Decision Making:   History:   Old Medical Records: I decided to obtain old medical records.  Initial Assessment:   Bj Pate Jr. Is a 73 y.o male with a PMHx of anemia, CKD stage 3, COPD, HLD, stroke, DM type 2, who presents to the ED with complaints of periumbilical abdominal pain beginning 2 weeks ago. Patient reports initial complaints of abdominal pain 2 weeks ago, endorsing his complaints exacerbated tonight and are worse at his periumbilical abdomen. Patient states he "couldn't stand up" due to the pain. Patient notes similar complaints 2 weeks ago where he saw GI. Endorses last BM took place yesterday at 0500, endorsing it was "tight and not normal." No medications taken PTA. No alleviating or exacerbating factors noted. Denies CP, SOB, nausea, vomiting, or other associated symptoms. Allergic to " naproxen.  Clinical Tests:   Lab Tests: Ordered and Reviewed  ED Management:  Course of ED stay:  1. Cardiovascular-patient was hemodynamically stable throughout his ED stay and chest pain-free.  2. Pulmonary-patient no signs or symptoms of respiratory distress and had normal O2 sats on air throughout ED stay.    3. Hematology/infectious disease-CBC is reassuring and patient has been afebrile throughout ED stay.  4. GI/nutrition/renal/endocrine-CMP is also reassuring and CT of the abdomen and pelvis with contrast was ordered.        Scribe Attestation:   Scribe #1: I performed the above scribed service and the documentation accurately describes the services I performed. I attest to the accuracy of the note.                   Clinical Impression:   Final diagnoses:  [K52.9] Colitis (Primary)        ED Disposition Condition    Discharge Stable          ED Prescriptions    None       Follow-up Information       Follow up With Specialties Details Why Contact Info    Jose Antonio Foster MD Family Medicine Schedule an appointment as soon as possible for a visit in 1 week For reevaluation [address] [place] [address]  [phone]              This document was produced by a scribe under my direction and in my presence. I agree with the content of the note and have made any necessary edits.     Dr. Brooks    04/06/2023 4:40 AM         eJanmarie Brooks MD  04/06/23 0511

## 2023-05-08 ENCOUNTER — TELEPHONE (OUTPATIENT)
Dept: FAMILY MEDICINE | Facility: CLINIC | Age: 73
End: 2023-05-08
Payer: MEDICARE

## 2023-05-08 NOTE — TELEPHONE ENCOUNTER
----- Message from Lucy Danielle sent at 5/8/2023  2:37 PM CDT -----  Type: Patient Call Back    Who called:pt     What is the request in detail:pt requesting to speak to nurse in regards to a hospital visit he had. The hospital gave him a medication that coated his stomach for pain. Call pt to discuss    Can the clinic reply by [facility]?    Would the patient rather a call back or a response via [facility]? call    Best call back number:[phone] (home)       Additional Information:

## 2023-05-08 NOTE — TELEPHONE ENCOUNTER
Per last ER visit he was diagnosed with colitis. A GI cocktail will not help colitis. I am not able to prescribe anything without seeing him, however if pain is as severe as he is stating he should go back to the ER ASAP

## 2023-05-08 NOTE — TELEPHONE ENCOUNTER
Patient stated he have been waiting to be seen since his ED visit and stated he was told to be seen in two weeks and its almost a month. I rescheduled patient from 5/26 to 5/11 with CARIDAD Sena patient stated he need something for his stomach he is back in pain like before. Patient stated in the ER he was given GI cocktail and is requesting that be sent to the pharmacy due to the amount pain he is in. Please advise.

## 2023-05-11 ENCOUNTER — LAB VISIT (OUTPATIENT)
Dept: LAB | Facility: HOSPITAL | Age: 73
End: 2023-05-11
Payer: MEDICARE

## 2023-05-11 ENCOUNTER — OFFICE VISIT (OUTPATIENT)
Dept: FAMILY MEDICINE | Facility: CLINIC | Age: 73
End: 2023-05-11
Payer: MEDICARE

## 2023-05-11 VITALS
TEMPERATURE: 98 F | OXYGEN SATURATION: 97 % | SYSTOLIC BLOOD PRESSURE: 142 MMHG | HEIGHT: 69 IN | DIASTOLIC BLOOD PRESSURE: 80 MMHG | RESPIRATION RATE: 17 BRPM | WEIGHT: 297.19 LBS | BODY MASS INDEX: 44.02 KG/M2 | HEART RATE: 98 BPM

## 2023-05-11 DIAGNOSIS — Z12.5 SCREENING PSA (PROSTATE SPECIFIC ANTIGEN): ICD-10-CM

## 2023-05-11 DIAGNOSIS — R10.30 LOWER ABDOMINAL PAIN: Primary | ICD-10-CM

## 2023-05-11 LAB — COMPLEXED PSA SERPL-MCNC: 0.57 NG/ML (ref 0–4)

## 2023-05-11 PROCEDURE — 1125F AMNT PAIN NOTED PAIN PRSNT: CPT | Mod: CPTII,S$GLB,, | Performed by: PHYSICIAN ASSISTANT

## 2023-05-11 PROCEDURE — 99999 PR PBB SHADOW E&M-EST. PATIENT-LVL V: CPT | Mod: PBBFAC,,, | Performed by: PHYSICIAN ASSISTANT

## 2023-05-11 PROCEDURE — 3079F DIAST BP 80-89 MM HG: CPT | Mod: CPTII,S$GLB,, | Performed by: PHYSICIAN ASSISTANT

## 2023-05-11 PROCEDURE — 1101F PT FALLS ASSESS-DOCD LE1/YR: CPT | Mod: CPTII,S$GLB,, | Performed by: PHYSICIAN ASSISTANT

## 2023-05-11 PROCEDURE — 3008F PR BODY MASS INDEX (BMI) DOCUMENTED: ICD-10-PCS | Mod: CPTII,S$GLB,, | Performed by: PHYSICIAN ASSISTANT

## 2023-05-11 PROCEDURE — 99214 OFFICE O/P EST MOD 30 MIN: CPT | Mod: S$GLB,,, | Performed by: PHYSICIAN ASSISTANT

## 2023-05-11 PROCEDURE — 84153 ASSAY OF PSA TOTAL: CPT | Performed by: PHYSICIAN ASSISTANT

## 2023-05-11 PROCEDURE — 3288F PR FALLS RISK ASSESSMENT DOCUMENTED: ICD-10-PCS | Mod: CPTII,S$GLB,, | Performed by: PHYSICIAN ASSISTANT

## 2023-05-11 PROCEDURE — 3044F PR MOST RECENT HEMOGLOBIN A1C LEVEL <7.0%: ICD-10-PCS | Mod: CPTII,S$GLB,, | Performed by: PHYSICIAN ASSISTANT

## 2023-05-11 PROCEDURE — 3288F FALL RISK ASSESSMENT DOCD: CPT | Mod: CPTII,S$GLB,, | Performed by: PHYSICIAN ASSISTANT

## 2023-05-11 PROCEDURE — 1159F MED LIST DOCD IN RCRD: CPT | Mod: CPTII,S$GLB,, | Performed by: PHYSICIAN ASSISTANT

## 2023-05-11 PROCEDURE — 3008F BODY MASS INDEX DOCD: CPT | Mod: CPTII,S$GLB,, | Performed by: PHYSICIAN ASSISTANT

## 2023-05-11 PROCEDURE — 1160F PR REVIEW ALL MEDS BY PRESCRIBER/CLIN PHARMACIST DOCUMENTED: ICD-10-PCS | Mod: CPTII,S$GLB,, | Performed by: PHYSICIAN ASSISTANT

## 2023-05-11 PROCEDURE — 1160F RVW MEDS BY RX/DR IN RCRD: CPT | Mod: CPTII,S$GLB,, | Performed by: PHYSICIAN ASSISTANT

## 2023-05-11 PROCEDURE — 3044F HG A1C LEVEL LT 7.0%: CPT | Mod: CPTII,S$GLB,, | Performed by: PHYSICIAN ASSISTANT

## 2023-05-11 PROCEDURE — 1125F PR PAIN SEVERITY QUANTIFIED, PAIN PRESENT: ICD-10-PCS | Mod: CPTII,S$GLB,, | Performed by: PHYSICIAN ASSISTANT

## 2023-05-11 PROCEDURE — 99999 PR PBB SHADOW E&M-EST. PATIENT-LVL V: ICD-10-PCS | Mod: PBBFAC,,, | Performed by: PHYSICIAN ASSISTANT

## 2023-05-11 PROCEDURE — 1101F PR PT FALLS ASSESS DOC 0-1 FALLS W/OUT INJ PAST YR: ICD-10-PCS | Mod: CPTII,S$GLB,, | Performed by: PHYSICIAN ASSISTANT

## 2023-05-11 PROCEDURE — 36415 COLL VENOUS BLD VENIPUNCTURE: CPT | Mod: PO | Performed by: PHYSICIAN ASSISTANT

## 2023-05-11 PROCEDURE — 99214 PR OFFICE/OUTPT VISIT, EST, LEVL IV, 30-39 MIN: ICD-10-PCS | Mod: S$GLB,,, | Performed by: PHYSICIAN ASSISTANT

## 2023-05-11 PROCEDURE — 1159F PR MEDICATION LIST DOCUMENTED IN MEDICAL RECORD: ICD-10-PCS | Mod: CPTII,S$GLB,, | Performed by: PHYSICIAN ASSISTANT

## 2023-05-11 PROCEDURE — 3077F SYST BP >= 140 MM HG: CPT | Mod: CPTII,S$GLB,, | Performed by: PHYSICIAN ASSISTANT

## 2023-05-11 PROCEDURE — 3079F PR MOST RECENT DIASTOLIC BLOOD PRESSURE 80-89 MM HG: ICD-10-PCS | Mod: CPTII,S$GLB,, | Performed by: PHYSICIAN ASSISTANT

## 2023-05-11 PROCEDURE — 3077F PR MOST RECENT SYSTOLIC BLOOD PRESSURE >= 140 MM HG: ICD-10-PCS | Mod: CPTII,S$GLB,, | Performed by: PHYSICIAN ASSISTANT

## 2023-05-11 RX ORDER — DICYCLOMINE HYDROCHLORIDE 20 MG/1
20 TABLET ORAL 3 TIMES DAILY PRN
Qty: 60 TABLET | Refills: 0 | Status: SHIPPED | OUTPATIENT
Start: 2023-05-11 | End: 2023-06-02 | Stop reason: SDUPTHER

## 2023-05-11 RX ORDER — LIDOCAINE HYDROCHLORIDE 20 MG/ML
15 SOLUTION OROPHARYNGEAL
Status: COMPLETED | OUTPATIENT
Start: 2023-05-11 | End: 2023-05-11

## 2023-05-11 RX ORDER — MAG HYDROX/ALUMINUM HYD/SIMETH 200-200-20
30 SUSPENSION, ORAL (FINAL DOSE FORM) ORAL
Status: COMPLETED | OUTPATIENT
Start: 2023-05-11 | End: 2023-05-11

## 2023-05-11 RX ADMIN — LIDOCAINE HYDROCHLORIDE 15 ML: 20 SOLUTION OROPHARYNGEAL at 02:05

## 2023-05-11 RX ADMIN — Medication 30 ML: at 02:05

## 2023-05-11 NOTE — PROGRESS NOTES
Subjective     Patient ID: Bj Pate Jr. is a 73 y.o. male.    Chief Complaint: No chief complaint on file.    HPI: 72 yo male presents for abdominal pain. Presented to ER in January for lower abdominal pain. Then again in April. CT showed possible colitis. Denied diarrhea at that time. Was given an GI cocktail in ER which pt states helped immensely. Pt has been pain free until this past weekend.   Pain is described as aching. Had diarrhea on Tuesday and yesterday. Normal BM today, no blood. + gas. Takes tylenol and gabapentin with little relief.   CT abdomen showed enlarged prostate, correlate with psa.  He has a colonscopy scheduled in august    Review of Systems   Constitutional:  Negative for fever.   Gastrointestinal:  Positive for abdominal pain. Negative for blood in stool, change in bowel habit, constipation, diarrhea, nausea and change in bowel habit.        Objective     Physical Exam  Constitutional:       Appearance: Normal appearance. He is obese.   HENT:      Head: Normocephalic and atraumatic.   Cardiovascular:      Rate and Rhythm: Normal rate and regular rhythm.   Pulmonary:      Effort: Pulmonary effort is normal.      Breath sounds: Normal breath sounds.   Abdominal:      General: Abdomen is flat. Bowel sounds are normal.      Tenderness: There is abdominal tenderness.       Neurological:      General: No focal deficit present.      Mental Status: He is alert and oriented to person, place, and time.   Psychiatric:         Mood and Affect: Mood normal.         Behavior: Behavior normal.          Assessment and Plan     Problem List Items Addressed This Visit    None  Visit Diagnoses       Lower abdominal pain    -  Primary    Relevant Medications    aluminum-magnesium hydroxide-simethicone 200-200-20 mg/5 mL suspension 30 mL (Completed)    LIDOcaine HCl 2% oral solution 15 mL (Completed)    dicyclomine (BENTYL) 20 mg tablet    Other Relevant Orders    Ambulatory referral/consult to  Gastroenterology    Screening PSA (prostate specific antigen)        Relevant Orders    PSA, SCREENING (Completed)             Bj was seen today for abdominal pain.    Diagnoses and all orders for this visit:    Lower abdominal pain  -     aluminum-magnesium hydroxide-simethicone 200-200-20 mg/5 mL suspension 30 mL  -     LIDOcaine HCl 2% oral solution 15 mL  -     dicyclomine (BENTYL) 20 mg tablet; Take 1 tablet (20 mg total) by mouth 3 (three) times daily as needed (abdominal pain).  -     Ambulatory referral/consult to Gastroenterology; Future    Screening PSA (prostate specific antigen)  -     PSA, SCREENING; Future

## 2023-05-15 DIAGNOSIS — M50.30 DDD (DEGENERATIVE DISC DISEASE), CERVICAL: ICD-10-CM

## 2023-05-15 DIAGNOSIS — M54.12 CERVICAL RADICULOPATHY: ICD-10-CM

## 2023-05-15 DIAGNOSIS — N40.0 BENIGN PROSTATIC HYPERPLASIA WITHOUT LOWER URINARY TRACT SYMPTOMS: ICD-10-CM

## 2023-05-15 DIAGNOSIS — M51.36 DDD (DEGENERATIVE DISC DISEASE), LUMBAR: ICD-10-CM

## 2023-05-15 NOTE — TELEPHONE ENCOUNTER
No care due was identified.  [facility] Embedded Care Due Messages. Reference number: [id number].   5/15/2023 4:23:11 PM CDT

## 2023-05-15 NOTE — TELEPHONE ENCOUNTER
----- Message from Josie Leigh sent at 5/15/2023  4:18 PM CDT -----  .Type: RX Refill Request    Who Called: pt     Have you contacted your pharmacy:ys     Refill or New Rx:    RX Name and Strength:tiZANidine (ZANAFLEX) 4 MG tablet    How is the patient currently taking it? (ex. 1XDay):    Is this a 30 day or 90 day RX:    Preferred Pharmacy with phone number:   [facility] #[id number] - [place], [place] [facility] [place] [address]  [place] [address]  Phone: [phone] Fax: [phone]    Local or Mail Order:    Ordering Provider:    Would the patient rather a call back or a response via [facility]?     Best Call Back Number [phone]      Additional Information:

## 2023-05-16 RX ORDER — TIZANIDINE 4 MG/1
4 TABLET ORAL NIGHTLY PRN
Qty: 90 TABLET | Refills: 1 | Status: SHIPPED | OUTPATIENT
Start: 2023-05-16 | End: 2023-11-12

## 2023-05-16 RX ORDER — TAMSULOSIN HYDROCHLORIDE 0.4 MG/1
0.4 CAPSULE ORAL DAILY
Qty: 90 CAPSULE | Refills: 3 | Status: SHIPPED | OUTPATIENT
Start: 2023-05-16 | End: 2023-07-06

## 2023-05-16 NOTE — TELEPHONE ENCOUNTER
----- Message from Chata Barros sent at 5/16/2023  1:01 PM CDT -----  Regarding: refill  Type:  RX Refill Request    Who Called: Edward  Refill or New Rx:refill  RX Name and Strength:tamsulosin (FLOMAX) 0.4 mg Cap, tiZANidine (ZANAFLEX) 4 MG tablet  How is the patient currently taking it? (ex. 1XDay):  Is this a 30 day or 90 day RX:90  Preferred Pharmacy with phone number:[facility] #[id number] [address]  Local or Mail Order:local  Ordering Provider:  Would the patient rather a call back or a response via [facility]? call  Best Call Back Number:[phone]  Additional Information:

## 2023-05-27 NOTE — PATIENT INSTRUCTIONS
Notified of recall. Instructed register device(s) on the recall website www.[employer].com/src-updates or call [phone]  Severe sleep apnea RDI 98.6 , pulmonary HTN, history stroke    Dr. Alisa Whitlock  [facility] [address] [place] [address]  [phone] referral services  [phone]    
No

## 2023-06-02 DIAGNOSIS — R10.30 LOWER ABDOMINAL PAIN: ICD-10-CM

## 2023-06-02 RX ORDER — DICYCLOMINE HYDROCHLORIDE 20 MG/1
20 TABLET ORAL 3 TIMES DAILY PRN
Qty: 60 TABLET | Refills: 0 | Status: SHIPPED | OUTPATIENT
Start: 2023-06-02 | End: 2023-06-28 | Stop reason: SDUPTHER

## 2023-06-02 NOTE — TELEPHONE ENCOUNTER
----- Message from Jeet Garcia sent at 6/2/2023  1:13 PM CDT -----  Type: RX Refill Request    Who Called: Self     Have you contacted your pharmacy:Yes     Refill or New Rx:REFILL     RX Name and Strength:dicyclomine (BENTYL) 20 mg tablet 60 tablet     Preferred Pharmacy with phone number:[id number] [place] [address]   Phone:  [phone]  Fax:  [phone]    Local or Mail Order:Local     Would the patient rather a call back or a response via [facility]? CALL     Best Call Back Number: [phone]

## 2023-06-05 ENCOUNTER — TELEPHONE (OUTPATIENT)
Dept: FAMILY MEDICINE | Facility: CLINIC | Age: 73
End: 2023-06-05

## 2023-06-05 NOTE — TELEPHONE ENCOUNTER
Per patient, he spoke with the pharmacist and was advised that the script will be filled.  Please be advised.

## 2023-06-05 NOTE — TELEPHONE ENCOUNTER
----- Message from Марина Brooks sent at 6/5/2023  2:38 PM CDT -----  Regarding: Refill Request  .Type: RX Refill Request    Who Called: self  Have you contacted your pharmacy:yes    Refill or New Rx: refill    RX Name and Strength:dicyclomine (BENTYL) 20 mg tablet    Preferred Pharmacy with phone number: .  [id number] [place] [id number] [facility] [address] [place] [address] [address]  [facility] [address]  Phone: [phone] Fax: [phone]       Local or Mail Order:local    Ordering Provider:Jaida     Would the patient rather a call back or a response via [facility]? call    Best Call Back Number: .[phone]     Additional Information: pharm is stating they need a prior auth for medication

## 2023-06-05 NOTE — TELEPHONE ENCOUNTER
----- Message from Spring Johnston sent at 6/5/2023  1:57 PM CDT -----  Regarding: Patient call back  Who called: JEAN TRINIDAD JR. [[id number]]    What is the request in detail: Patient is requesting a call back.He states he is having issues getting his dicyclomine (BENTYL) 20 mg tablet he would like to further discuss.   Please advise.    Can the clinic reply by [facility]? No    Best call back number: [phone]    Additional Information: N/A

## 2023-06-09 ENCOUNTER — TELEPHONE (OUTPATIENT)
Dept: FAMILY MEDICINE | Facility: CLINIC | Age: 73
End: 2023-06-09
Payer: MEDICARE

## 2023-06-12 ENCOUNTER — TELEPHONE (OUTPATIENT)
Dept: ADMINISTRATIVE | Facility: HOSPITAL | Age: 73
End: 2023-06-12
Payer: MEDICARE

## 2023-06-12 ENCOUNTER — PATIENT MESSAGE (OUTPATIENT)
Dept: ADMINISTRATIVE | Facility: HOSPITAL | Age: 73
End: 2023-06-12
Payer: MEDICARE

## 2023-06-12 ENCOUNTER — PATIENT OUTREACH (OUTPATIENT)
Dept: ADMINISTRATIVE | Facility: HOSPITAL | Age: 73
End: 2023-06-12
Payer: MEDICARE

## 2023-06-12 VITALS — DIASTOLIC BLOOD PRESSURE: 78 MMHG | SYSTOLIC BLOOD PRESSURE: 138 MMHG

## 2023-06-12 NOTE — PROGRESS NOTES
HM and immunization's reviewed and updated. Remote bp reading portal push response reading of 138/78.

## 2023-06-13 ENCOUNTER — TELEPHONE (OUTPATIENT)
Dept: GASTROENTEROLOGY | Facility: CLINIC | Age: 73
End: 2023-06-13
Payer: MEDICARE

## 2023-06-13 NOTE — TELEPHONE ENCOUNTER
Patient contacted our office to reschedule his appointment for a sooner one.  Patient was reschedule for our next available on 06/20/23 @ 1:00 pm.  Office call back number was left on patient's answering machine.

## 2023-06-13 NOTE — TELEPHONE ENCOUNTER
----- Message from Americo Cabrera sent at 6/13/2023  8:36 AM CDT -----  Who Called: [facility]         What is the reqeust in detail: Requesting call back to discuss seeing if an earlier appt is available, pt had to go to the er a couple of times.          Can the clinic reply by [facility]? No         Best Call Back Number: [phone]         Additional Information:

## 2023-06-14 ENCOUNTER — OFFICE VISIT (OUTPATIENT)
Dept: GASTROENTEROLOGY | Facility: CLINIC | Age: 73
End: 2023-06-14
Payer: MEDICARE

## 2023-06-14 VITALS
DIASTOLIC BLOOD PRESSURE: 75 MMHG | WEIGHT: 289.69 LBS | HEIGHT: 66 IN | BODY MASS INDEX: 46.56 KG/M2 | HEART RATE: 102 BPM | SYSTOLIC BLOOD PRESSURE: 105 MMHG

## 2023-06-14 DIAGNOSIS — R10.30 LOWER ABDOMINAL PAIN: ICD-10-CM

## 2023-06-14 DIAGNOSIS — R19.7 DIARRHEA, UNSPECIFIED TYPE: Primary | ICD-10-CM

## 2023-06-14 PROCEDURE — 3078F PR MOST RECENT DIASTOLIC BLOOD PRESSURE < 80 MM HG: ICD-10-PCS | Mod: CPTII,S$GLB,, | Performed by: INTERNAL MEDICINE

## 2023-06-14 PROCEDURE — 3074F PR MOST RECENT SYSTOLIC BLOOD PRESSURE < 130 MM HG: ICD-10-PCS | Mod: CPTII,S$GLB,, | Performed by: INTERNAL MEDICINE

## 2023-06-14 PROCEDURE — 3008F PR BODY MASS INDEX (BMI) DOCUMENTED: ICD-10-PCS | Mod: CPTII,S$GLB,, | Performed by: INTERNAL MEDICINE

## 2023-06-14 PROCEDURE — 99999 PR PBB SHADOW E&M-EST. PATIENT-LVL V: CPT | Mod: PBBFAC,,, | Performed by: INTERNAL MEDICINE

## 2023-06-14 PROCEDURE — 1160F RVW MEDS BY RX/DR IN RCRD: CPT | Mod: CPTII,S$GLB,, | Performed by: INTERNAL MEDICINE

## 2023-06-14 PROCEDURE — 1125F AMNT PAIN NOTED PAIN PRSNT: CPT | Mod: CPTII,S$GLB,, | Performed by: INTERNAL MEDICINE

## 2023-06-14 PROCEDURE — 1160F PR REVIEW ALL MEDS BY PRESCRIBER/CLIN PHARMACIST DOCUMENTED: ICD-10-PCS | Mod: CPTII,S$GLB,, | Performed by: INTERNAL MEDICINE

## 2023-06-14 PROCEDURE — 3078F DIAST BP <80 MM HG: CPT | Mod: CPTII,S$GLB,, | Performed by: INTERNAL MEDICINE

## 2023-06-14 PROCEDURE — 1159F PR MEDICATION LIST DOCUMENTED IN MEDICAL RECORD: ICD-10-PCS | Mod: CPTII,S$GLB,, | Performed by: INTERNAL MEDICINE

## 2023-06-14 PROCEDURE — 99204 PR OFFICE/OUTPT VISIT, NEW, LEVL IV, 45-59 MIN: ICD-10-PCS | Mod: S$GLB,,, | Performed by: INTERNAL MEDICINE

## 2023-06-14 PROCEDURE — 3074F SYST BP LT 130 MM HG: CPT | Mod: CPTII,S$GLB,, | Performed by: INTERNAL MEDICINE

## 2023-06-14 PROCEDURE — 3044F HG A1C LEVEL LT 7.0%: CPT | Mod: CPTII,S$GLB,, | Performed by: INTERNAL MEDICINE

## 2023-06-14 PROCEDURE — 1125F PR PAIN SEVERITY QUANTIFIED, PAIN PRESENT: ICD-10-PCS | Mod: CPTII,S$GLB,, | Performed by: INTERNAL MEDICINE

## 2023-06-14 PROCEDURE — 3044F PR MOST RECENT HEMOGLOBIN A1C LEVEL <7.0%: ICD-10-PCS | Mod: CPTII,S$GLB,, | Performed by: INTERNAL MEDICINE

## 2023-06-14 PROCEDURE — 99204 OFFICE O/P NEW MOD 45 MIN: CPT | Mod: S$GLB,,, | Performed by: INTERNAL MEDICINE

## 2023-06-14 PROCEDURE — 1159F MED LIST DOCD IN RCRD: CPT | Mod: CPTII,S$GLB,, | Performed by: INTERNAL MEDICINE

## 2023-06-14 PROCEDURE — 3008F BODY MASS INDEX DOCD: CPT | Mod: CPTII,S$GLB,, | Performed by: INTERNAL MEDICINE

## 2023-06-14 PROCEDURE — 99999 PR PBB SHADOW E&M-EST. PATIENT-LVL V: ICD-10-PCS | Mod: PBBFAC,,, | Performed by: INTERNAL MEDICINE

## 2023-06-14 RX ORDER — DIPHENOXYLATE HYDROCHLORIDE AND ATROPINE SULFATE 2.5; .025 MG/1; MG/1
1 TABLET ORAL 4 TIMES DAILY PRN
Qty: 60 TABLET | Refills: 3 | Status: SHIPPED | OUTPATIENT
Start: 2023-06-14 | End: 2023-12-11

## 2023-06-14 NOTE — PROGRESS NOTES
"[facility]    CC: diarrhea     HPI 73 y.o. male with past medical history of CVA and HTN who presents with 2 months of new onset, severe, painful diarrhea having liquid stools throughout the day with associated lower abdominal pain.     He denies sick contacts and travel.    He has overall had a decreased appetite.    He denies nausea, vomiting, melena, hematochezia, GERD, dysphagia, fevers and chills.      His last colonoscopy was about 15 years ago.    Past Medical History  Past Medical History:   Diagnosis Date    Anemia 06/04/2021    Anticoagulant long-term use     Basal ganglia hemorrhage     Left basal ganglia hemorrhage with resultant right-sided hemiparesis which has resolved.     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          Physical Examination  /75   Pulse 102   Ht 5' 6" (1.676 m)   Wt 131.4 kg (289 lb 11 oz)   BMI 46.76 kg/m²   General appearance: alert, cooperative, no distress  HENT: Normocephalic, atraumatic, neck symmetrical, no nasal discharge   Abdomen: soft, non-tender; no rebound or guarding, no organomegaly  Neurologic: Alert and oriented X 3, moving all four extremities, intact sensation to light touch    Labs:  Lab Results   Component Value Date    WBC 6.85 04/06/2023    HGB 13.0 (L) 04/06/2023    HCT 39.2 (L) 04/06/2023    MCV 87 04/06/2023     04/06/2023         CMP  Sodium   Date Value Ref Range Status   04/06/2023 136 136 - " 145 mmol/L Final     Potassium   Date Value Ref Range Status   04/06/2023 3.8 3.5 - 5.1 mmol/L Final     Comment:     Specimen moderately hemolyzed     Chloride   Date Value Ref Range Status   04/06/2023 96 95 - 110 mmol/L Final     CO2   Date Value Ref Range Status   04/06/2023 25 23 - 29 mmol/L Final     Glucose   Date Value Ref Range Status   04/06/2023 100 70 - 110 mg/dL Final     BUN   Date Value Ref Range Status   04/06/2023 12 8 - 23 mg/dL Final     Creatinine   Date Value Ref Range Status   04/06/2023 1.4 0.5 - 1.4 mg/dL Final     Calcium   Date Value Ref Range Status   04/06/2023 9.0 8.7 - 10.5 mg/dL Final     Total Protein   Date Value Ref Range Status   04/06/2023 8.0 6.0 - 8.4 g/dL Final     Albumin   Date Value Ref Range Status   04/06/2023 3.1 (L) 3.5 - 5.2 g/dL Final     Total Bilirubin   Date Value Ref Range Status   04/06/2023 0.4 0.1 - 1.0 mg/dL Final     Comment:     For infants and newborns, interpretation of results should be based  on gestational age, weight and in agreement with clinical  observations.    Premature Infant recommended reference ranges:  Up to 24 hours.............<8.0 mg/dL  Up to 48 hours............<12.0 mg/dL  3-5 days..................<15.0 mg/dL  6-29 days.................<15.0 mg/dL       Alkaline Phosphatase   Date Value Ref Range Status   04/06/2023 85 55 - 135 U/L Final     AST   Date Value Ref Range Status   04/06/2023 23 10 - 40 U/L Final     ALT   Date Value Ref Range Status   04/06/2023 16 10 - 44 U/L Final     Anion Gap   Date Value Ref Range Status   04/06/2023 15 8 - 16 mmol/L Final     eGFR   Date Value Ref Range Status   04/06/2023 54 (A) >60 mL/min/1.73 m^2 Final         Assessment:   1. Diarrhea, unspecified type    2. Lower abdominal pain    3.      Thickening of the descending colon on CT 4/6/23    Plan:  -Check stool studies.  -Start Lomotil as needed for diarrhea.  -Schedule EGD and colonoscopy for further evaluation.    Jaylene Alvarado MD

## 2023-06-15 ENCOUNTER — TELEPHONE (OUTPATIENT)
Dept: ENDOSCOPY | Facility: HOSPITAL | Age: 73
End: 2023-06-15
Payer: MEDICARE

## 2023-06-15 ENCOUNTER — PATIENT MESSAGE (OUTPATIENT)
Dept: ENDOSCOPY | Facility: HOSPITAL | Age: 73
End: 2023-06-15
Payer: MEDICARE

## 2023-06-15 ENCOUNTER — LAB VISIT (OUTPATIENT)
Dept: LAB | Facility: HOSPITAL | Age: 73
End: 2023-06-15
Attending: INTERNAL MEDICINE
Payer: MEDICARE

## 2023-06-15 DIAGNOSIS — R93.3 ABNORMAL FINDING ON GI TRACT IMAGING: ICD-10-CM

## 2023-06-15 DIAGNOSIS — R19.7 DIARRHEA, UNSPECIFIED TYPE: Primary | ICD-10-CM

## 2023-06-15 DIAGNOSIS — R19.7 DIARRHEA, UNSPECIFIED TYPE: ICD-10-CM

## 2023-06-15 LAB
C DIFF GDH STL QL: POSITIVE
C DIFF TOX A+B STL QL IA: NEGATIVE

## 2023-06-15 PROCEDURE — 83993 ASSAY FOR CALPROTECTIN FECAL: CPT | Performed by: INTERNAL MEDICINE

## 2023-06-15 PROCEDURE — 82653 EL-1 FECAL QUANTITATIVE: CPT | Performed by: INTERNAL MEDICINE

## 2023-06-15 PROCEDURE — 87329 GIARDIA AG IA: CPT | Performed by: INTERNAL MEDICINE

## 2023-06-15 PROCEDURE — 87493 C DIFF AMPLIFIED PROBE: CPT | Performed by: INTERNAL MEDICINE

## 2023-06-15 PROCEDURE — 87449 NOS EACH ORGANISM AG IA: CPT | Performed by: INTERNAL MEDICINE

## 2023-06-15 RX ORDER — POLYETHYLENE GLYCOL 3350, SODIUM SULFATE ANHYDROUS, SODIUM BICARBONATE, SODIUM CHLORIDE, POTASSIUM CHLORIDE 236; 22.74; 6.74; 5.86; 2.97 G/4L; G/4L; G/4L; G/4L; G/4L
4 POWDER, FOR SOLUTION ORAL ONCE
Qty: 4000 ML | Refills: 0 | Status: SHIPPED | OUTPATIENT
Start: 2023-06-15 | End: 2023-06-15

## 2023-06-15 NOTE — TELEPHONE ENCOUNTER
"Jaylene Alvarado MD  [facility] Patients  Procedure: EGD/Colonoscopy     Diagnosis: Diarrhea and Abnormal finding on GI tract imaging     Procedure Timin-4 weeks     *If within 4 weeks selected, please jinny as high priority*     *If greater than 12 weeks, please select "5-12 weeks" and delay sending until 2 months prior to requested date*     Provider: Myself     Location: [facility]     Additional Scheduling Information: No scheduling concerns     Prep Specifications:Standard prep     Have you attached a patient to this message: yes    "

## 2023-06-15 NOTE — TELEPHONE ENCOUNTER
Spoke to pt to schedule procedure(s) Colonoscopy/EGD       Physician to perform procedure(s) Dr. ELVIS Alvarado   Date of Procedure(s) 7/28/23  Tentative Time of Procedure(s) 11:00 AM   Location of Procedure(s) [address]  Type of Rx Prep sent to patient: PEG  Instructions provided to patient via [facility]    Patient was informed on the following information and verbalized understanding. Screening questionnaire reviewed with patient and complete. If procedure requires anesthesia, a responsible adult needs to be present to accompany the patient home, patient cannot drive after receiving anesthesia. Appointment details are tentative, especially check-in time. Patient will receive a prep-op call 4 days prior to confirm check-in time for procedure. If applicable the patient should contact their pharmacy to verify Rx for procedure prep is ready for pick-up. Patient was advised to call the scheduling department at [phone] if pharmacy states no Rx is available. Patient was advised to call the endoscopy scheduling department if any questions or concerns arise.      [facility] Scheduling Department

## 2023-06-16 ENCOUNTER — TELEPHONE (OUTPATIENT)
Dept: GASTROENTEROLOGY | Facility: CLINIC | Age: 73
End: 2023-06-16
Payer: MEDICARE

## 2023-06-16 DIAGNOSIS — A04.72 C. DIFFICILE DIARRHEA: Primary | ICD-10-CM

## 2023-06-16 LAB — C DIFF TOX GENS STL QL NAA+PROBE: POSITIVE

## 2023-06-16 RX ORDER — VANCOMYCIN HYDROCHLORIDE 125 MG/1
125 CAPSULE ORAL 4 TIMES DAILY
Qty: 40 CAPSULE | Refills: 0 | Status: SHIPPED | OUTPATIENT
Start: 2023-06-16 | End: 2023-06-26

## 2023-06-16 NOTE — TELEPHONE ENCOUNTER
[facility]    C diff positive.  Vancomycin 125mg PO four times daily x 10 days sent to his pharmacy.  Stop Lomotil.  I spoke with the patient about his results.    Jaylene Alvarado MD

## 2023-06-18 LAB
CRYPTOSP AG STL QL IA: NEGATIVE
G LAMBLIA AG STL QL IA: NEGATIVE

## 2023-06-20 LAB — ELASTASE 1, FECAL: >500 MCG/G

## 2023-06-21 LAB — CALPROTECTIN STL-MCNT: <27.1 MCG/G

## 2023-06-23 DIAGNOSIS — I10 ESSENTIAL HYPERTENSION: ICD-10-CM

## 2023-06-23 DIAGNOSIS — R10.30 LOWER ABDOMINAL PAIN: ICD-10-CM

## 2023-06-23 RX ORDER — DILTIAZEM HYDROCHLORIDE 240 MG/1
240 CAPSULE, COATED, EXTENDED RELEASE ORAL DAILY
Qty: 90 CAPSULE | Refills: 2 | Status: ON HOLD | OUTPATIENT
Start: 2023-06-23 | End: 2023-12-28 | Stop reason: HOSPADM

## 2023-06-23 NOTE — TELEPHONE ENCOUNTER
Last Office Visit Info:   The patient's last visit with Jose Antonio Foster MD was on 3/16/2023.    The patient's last visit in current department was on 5/11/2023.        Last CBC Results:   Lab Results   Component Value Date    WBC 6.85 04/06/2023    HGB 13.0 (L) 04/06/2023    HCT 39.2 (L) 04/06/2023     04/06/2023       Last CMP Results  Lab Results   Component Value Date     04/06/2023    K 3.8 04/06/2023    CL 96 04/06/2023    CO2 25 04/06/2023    BUN 12 04/06/2023    CREATININE 1.4 04/06/2023    CALCIUM 9.0 04/06/2023    ALBUMIN 3.1 (L) 04/06/2023    AST 23 04/06/2023    ALT 16 04/06/2023       Last Lipids  Lab Results   Component Value Date    CHOL 116 (L) 03/11/2022    TRIG 91 03/11/2022    HDL 51 03/11/2022    LDLCALC 46.8 (L) 03/11/2022       Last A1C  Lab Results   Component Value Date    HGBA1C 6.1 (H) 03/07/2023       Last TSH  Lab Results   Component Value Date    TSH 0.445 02/27/2020             Current Med Refills  Medication List with Changes/Refills   Current Medications    ACETAMINOPHEN (TYLENOL) 325 MG TABLET    Take 2 tablets (650 mg total) by mouth every 6 (six) hours as needed for Pain or Temperature greater than (100.3).       Start Date: 1/20/2023 End Date: --    ALBUTEROL (PROVENTIL/VENTOLIN HFA) 90 MCG/ACTUATION INHALER    Inhale 2 puffs into the lungs every 6 (six) hours as needed for Wheezing. Rescue       Start Date: 3/16/2023 End Date: --    ASCORBIC ACID, VITAMIN C, (VITAMIN C) 500 MG TABLET    Take 500 mg by mouth once daily.       Start Date: --        End Date: --    ASPIRIN (ECOTRIN) 81 MG EC TABLET    Take 81 mg by mouth once daily.       Start Date: --        End Date: --    ATORVASTATIN (LIPITOR) 40 MG TABLET    Take 1 tablet (40 mg total) by mouth once daily.       Start Date: 3/16/2023 End Date: --    BLOOD PRESSURE TEST KIT-LARGE KIT    1 Device by Misc.(Non-Drug; Combo Route) route 2 (two) times daily.       Start Date: 8/28/2017 End Date: --    BLOOD SUGAR  DIAGNOSTIC STRP    To check BG 2 times daily, to use with insurance preferred meter       Start Date: 11/5/2020 End Date: --    BLOOD-GLUCOSE METER KIT    To check BG 2 times daily, to use with insurance preferred meter       Start Date: 2/5/2019  End Date: 5/11/2023    CHLORHEXIDINE (PERIDEX) 0.12 % SOLUTION    Use as directed 15 mLs in the mouth or throat 2 (two) times daily.       Start Date: 10/26/2021End Date: --    COLCHICINE (COLCRYS) 0.6 MG TABLET    Take 1 tablet (0.6 mg total) by mouth daily as needed.       Start Date: 3/16/2023 End Date: 3/15/2024    DICYCLOMINE (BENTYL) 20 MG TABLET    Take 1 tablet (20 mg total) by mouth 3 (three) times daily as needed (abdominal pain).       Start Date: 6/2/2023  End Date: 7/2/2023    DILTIAZEM (CARDIZEM CD) 240 MG 24 HR CAPSULE    Take 1 capsule (240 mg total) by mouth once daily.       Start Date: 3/16/2023 End Date: --    DIPHENOXYLATE-ATROPINE 2.5-0.025 MG (LOMOTIL) 2.5-0.025 MG PER TABLET    Take 1 tablet by mouth 4 (four) times daily as needed for Diarrhea.       Start Date: 6/14/2023 End Date: --    FLUTICASONE-SALMETEROL DISKUS INHALER 500-50 MCG    Inhale 1 puff into the lungs 2 (two) times daily. Controller       Start Date: 3/16/2023 End Date: 3/15/2024    GABAPENTIN (NEURONTIN) 600 MG TABLET    Take 1 tablet (600 mg total) by mouth 3 (three) times daily.       Start Date: 3/16/2023 End Date: --    HYDROCHLOROTHIAZIDE (HYDRODIURIL) 25 MG TABLET    Take 1 tablet (25 mg total) by mouth once daily.       Start Date: 3/16/2023 End Date: --    HYDROCODONE-ACETAMINOPHEN (NORCO) 5-325 MG PER TABLET    Take 1 tablet by mouth every 6 (six) hours as needed for Pain.       Start Date: 1/23/2023 End Date: --    LANCETS MISC    To check BG 2 times daily, to use with insurance preferred meter       Start Date: 10/2/2020 End Date: --    PREVIDENT 5000 SENSITIVE 1.1-5 % PSTE    use as directed       Start Date: 9/14/2021 End Date: --    TAMSULOSIN (FLOMAX) 0.4 MG CAP     Take 1 capsule (0.4 mg total) by mouth once daily.       Start Date: 5/16/2023 End Date: --    TIZANIDINE (ZANAFLEX) 4 MG TABLET    Take 1 tablet (4 mg total) by mouth nightly as needed.       Start Date: 5/16/2023 End Date: 11/12/2023    VANCOMYCIN (VANCOCIN) 125 MG CAPSULE    Take 1 capsule (125 mg total) by mouth 4 (four) times daily. for 10 days       Start Date: 6/16/2023 End Date: 6/26/2023    VITAMIN D (VITAMIN D3) 1000 UNITS TAB    Take 1,000 Units by mouth once daily.       Start Date: --        End Date: --       Order(s) placed per written order guidelines: none    Please advise.

## 2023-06-23 NOTE — TELEPHONE ENCOUNTER
No care due was identified.  [facility] Embedded Care Due Messages. Reference number: [id number].   6/23/2023 1:03:25 PM CDT

## 2023-06-28 RX ORDER — DICYCLOMINE HYDROCHLORIDE 20 MG/1
TABLET ORAL
Qty: 60 TABLET | Refills: 0 | Status: SHIPPED | OUTPATIENT
Start: 2023-06-28 | End: 2023-12-11

## 2023-07-06 ENCOUNTER — OFFICE VISIT (OUTPATIENT)
Dept: FAMILY MEDICINE | Facility: CLINIC | Age: 73
End: 2023-07-06
Payer: MEDICARE

## 2023-07-06 VITALS
RESPIRATION RATE: 18 BRPM | TEMPERATURE: 98 F | DIASTOLIC BLOOD PRESSURE: 72 MMHG | HEART RATE: 88 BPM | BODY MASS INDEX: 45.14 KG/M2 | WEIGHT: 280.88 LBS | SYSTOLIC BLOOD PRESSURE: 118 MMHG | OXYGEN SATURATION: 97 % | HEIGHT: 66 IN

## 2023-07-06 DIAGNOSIS — A04.72 C. DIFFICILE DIARRHEA: Primary | ICD-10-CM

## 2023-07-06 DIAGNOSIS — I10 ESSENTIAL HYPERTENSION: ICD-10-CM

## 2023-07-06 DIAGNOSIS — N18.31 CHRONIC KIDNEY DISEASE, STAGE 3A: ICD-10-CM

## 2023-07-06 DIAGNOSIS — E78.2 MIXED HYPERLIPIDEMIA: ICD-10-CM

## 2023-07-06 PROCEDURE — 3078F DIAST BP <80 MM HG: CPT | Mod: CPTII,S$GLB,, | Performed by: FAMILY MEDICINE

## 2023-07-06 PROCEDURE — 99214 OFFICE O/P EST MOD 30 MIN: CPT | Mod: S$GLB,,, | Performed by: FAMILY MEDICINE

## 2023-07-06 PROCEDURE — 3074F SYST BP LT 130 MM HG: CPT | Mod: CPTII,S$GLB,, | Performed by: FAMILY MEDICINE

## 2023-07-06 PROCEDURE — 99214 PR OFFICE/OUTPT VISIT, EST, LEVL IV, 30-39 MIN: ICD-10-PCS | Mod: S$GLB,,, | Performed by: FAMILY MEDICINE

## 2023-07-06 PROCEDURE — 1125F PR PAIN SEVERITY QUANTIFIED, PAIN PRESENT: ICD-10-PCS | Mod: CPTII,S$GLB,, | Performed by: FAMILY MEDICINE

## 2023-07-06 PROCEDURE — 1101F PR PT FALLS ASSESS DOC 0-1 FALLS W/OUT INJ PAST YR: ICD-10-PCS | Mod: CPTII,S$GLB,, | Performed by: FAMILY MEDICINE

## 2023-07-06 PROCEDURE — 99999 PR PBB SHADOW E&M-EST. PATIENT-LVL V: CPT | Mod: PBBFAC,,, | Performed by: FAMILY MEDICINE

## 2023-07-06 PROCEDURE — 1159F PR MEDICATION LIST DOCUMENTED IN MEDICAL RECORD: ICD-10-PCS | Mod: CPTII,S$GLB,, | Performed by: FAMILY MEDICINE

## 2023-07-06 PROCEDURE — 3074F PR MOST RECENT SYSTOLIC BLOOD PRESSURE < 130 MM HG: ICD-10-PCS | Mod: CPTII,S$GLB,, | Performed by: FAMILY MEDICINE

## 2023-07-06 PROCEDURE — 3008F BODY MASS INDEX DOCD: CPT | Mod: CPTII,S$GLB,, | Performed by: FAMILY MEDICINE

## 2023-07-06 PROCEDURE — 3288F FALL RISK ASSESSMENT DOCD: CPT | Mod: CPTII,S$GLB,, | Performed by: FAMILY MEDICINE

## 2023-07-06 PROCEDURE — 3078F PR MOST RECENT DIASTOLIC BLOOD PRESSURE < 80 MM HG: ICD-10-PCS | Mod: CPTII,S$GLB,, | Performed by: FAMILY MEDICINE

## 2023-07-06 PROCEDURE — 3008F PR BODY MASS INDEX (BMI) DOCUMENTED: ICD-10-PCS | Mod: CPTII,S$GLB,, | Performed by: FAMILY MEDICINE

## 2023-07-06 PROCEDURE — 3044F PR MOST RECENT HEMOGLOBIN A1C LEVEL <7.0%: ICD-10-PCS | Mod: CPTII,S$GLB,, | Performed by: FAMILY MEDICINE

## 2023-07-06 PROCEDURE — 3044F HG A1C LEVEL LT 7.0%: CPT | Mod: CPTII,S$GLB,, | Performed by: FAMILY MEDICINE

## 2023-07-06 PROCEDURE — 1101F PT FALLS ASSESS-DOCD LE1/YR: CPT | Mod: CPTII,S$GLB,, | Performed by: FAMILY MEDICINE

## 2023-07-06 PROCEDURE — 1159F MED LIST DOCD IN RCRD: CPT | Mod: CPTII,S$GLB,, | Performed by: FAMILY MEDICINE

## 2023-07-06 PROCEDURE — 3288F PR FALLS RISK ASSESSMENT DOCUMENTED: ICD-10-PCS | Mod: CPTII,S$GLB,, | Performed by: FAMILY MEDICINE

## 2023-07-06 PROCEDURE — 99999 PR PBB SHADOW E&M-EST. PATIENT-LVL V: ICD-10-PCS | Mod: PBBFAC,,, | Performed by: FAMILY MEDICINE

## 2023-07-06 PROCEDURE — 1125F AMNT PAIN NOTED PAIN PRSNT: CPT | Mod: CPTII,S$GLB,, | Performed by: FAMILY MEDICINE

## 2023-07-06 RX ORDER — COLCHICINE 0.6 MG/1
CAPSULE ORAL
COMMUNITY
Start: 2023-02-23 | End: 2023-12-11

## 2023-07-06 RX ORDER — POLYETHYLENE GLYCOL 3350, SODIUM SULFATE ANHYDROUS, SODIUM BICARBONATE, SODIUM CHLORIDE, POTASSIUM CHLORIDE 236; 22.74; 6.74; 5.86; 2.97 G/4L; G/4L; G/4L; G/4L; G/4L
4000 POWDER, FOR SOLUTION ORAL ONCE
COMMUNITY
Start: 2023-06-15 | End: 2023-12-11

## 2023-07-06 NOTE — PROGRESS NOTES
Subjective:       Patient ID: Bj Pate Jr. is a 73 y.o. male.    Chief Complaint: Follow-up      Follow-up    73-year-old male presents for diarrhea follow-up.  Patient has been treated for C diff.  States his diarrhea has improved after completing medication.  Will undergo colonoscopy next month.  States he is taking his other medications.  Has stopped his Imodium about a week ago.  States he feels he has less energy due to diarrhea.        Review of Systems   Constitutional: Negative.    HENT: Negative.     Respiratory: Negative.     Cardiovascular: Negative.    Gastrointestinal: Negative.    Endocrine: Negative.    Genitourinary: Negative.    Musculoskeletal: Negative.    Neurological: Negative.    Psychiatric/Behavioral: Negative.          Past Medical History:   Diagnosis Date    Anemia 06/04/2021    Anticoagulant long-term use     Basal ganglia hemorrhage     Left basal ganglia hemorrhage with resultant right-sided hemiparesis which has resolved.     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      Past Surgical History:   Procedure Laterality Date    CARPAL TUNNEL RELEASE Left 2/19/2020    Procedure: RELEASE, CARPAL TUNNEL LEFT;  Surgeon: Maria Luisa Mccurdy MD;  Location: [facility];  Service: Orthopedics;  Laterality: Left;    EPIDURAL STEROID INJECTION N/A 5/2/2019    Procedure: Injection, Steroid, Epidural Cervical;  Surgeon: Dariel Doan Jr., MD;  Location: [facility];   Service: Pain Management;  Laterality: N/A;  Cervical Epidural Steroid Injection     40239    Arrive @ 1130; ASA; Check BG    EPIDURAL STEROID INJECTION Bilateral 5/29/2019    Procedure: Lumbar Medial Branch Blocks;  Surgeon: Dariel Doan Jr., MD;  Location: [facility] ENDO;  Service: Pain Management;  Laterality: Bilateral;  Bilateral Lumbar Medial Branch Blocks L3, L4, L5    [id number]  [id number]    Attive @ 1030 (11 arrival request); NO Sedation; ASA; Check BG    EPIDURAL STEROID INJECTION Bilateral 7/3/2019    Procedure: Lumbar Medial Branch Blocks;  Surgeon: Dariel Doan Jr., MD;  Location: [facility] ENDO;  Service: Pain Management;  Laterality: Bilateral;  Bilateral Lumbar Medial Branch Blocks L3, L4, L5    [id number]  [id number]    Arrive @ 0930; NO Sedation; ASA; Check BG    EPIDURAL STEROID INJECTION N/A 8/7/2019    Procedure: Injection, Steroid, Epidural Cervical;  Surgeon: Dariel Doan Jr., MD;  Location: [facility] ENDO;  Service: Pain Management;  Laterality: N/A;  Cervical Epidural Steroid Injection C7-T1    17028    Arrive @ 1115; Last ASA 7/30; Check BG    LEFT HEART CATHETERIZATION Left 9/21/2021    Procedure: Left heart cath 9am start, R rad access;  Surgeon: Dariel Conner MD;  Location: [facility] CATH LAB;  Service: Cardiology;  Laterality: Left;  RN PRE OP Covid NEGATIVE ON  9-20-21.  C A    NO PAST SURGERIES       Family History   Problem Relation Age of Onset    No Known Problems Mother     No Known Problems Father     Hypertension Unknown     Diabetes Unknown     Diabetes Paternal Grandfather     Heart disease Paternal Grandfather     No Known Problems Sister     No Known Problems Brother     No Known Problems Maternal Aunt     No Known Problems Maternal Uncle     No Known Problems Paternal Aunt     No Known Problems Paternal Uncle     No Known Problems Maternal Grandmother     No Known Problems Maternal Grandfather     No Known Problems Paternal Grandmother     Amblyopia Neg Hx     Blindness Neg Hx      Cancer Neg Hx     Cataracts Neg Hx     Glaucoma Neg Hx     Macular degeneration Neg Hx     Retinal detachment Neg Hx     Strabismus Neg Hx     Stroke Neg Hx     Thyroid disease Neg Hx      Social History     Socioeconomic History    Marital status:     Number of children: 8    Highest education level: 11th grade   Occupational History    Occupation:    Tobacco Use    Smoking status: Never    Smokeless tobacco: Never   Substance and Sexual Activity    Alcohol use: Yes     Alcohol/week: 0.0 standard drinks     Comment: occacionally    Drug use: No    Sexual activity: Not Currently     Social Determinants of Health     Financial Resource Strain: Low Risk     Difficulty of Paying Living Expenses: Not very hard   Food Insecurity: No Food Insecurity    Worried About Running Out of Food in the Last Year: Never true    Ran Out of Food in the Last Year: Never true   Transportation Needs: No Transportation Needs    Lack of Transportation (Medical): No    Lack of Transportation (Non-Medical): No   Physical Activity: Inactive    Days of Exercise per Week: 0 days    Minutes of Exercise per Session: 0 min   Stress: No Stress Concern Present    Feeling of Stress : Not at all   Social Connections: Socially Isolated    Frequency of Communication with Friends and Family: More than three times a week    Frequency of Social Gatherings with Friends and Family: More than three times a week    Attends Mandaeism Services: Never    Active Member of Clubs or Organizations: No    Attends Club or Organization Meetings: Never    Marital Status:    Housing Stability: Unknown    Unable to Pay for Housing in the Last Year: No    Unstable Housing in the Last Year: No       Current Outpatient Medications:     acetaminophen (TYLENOL) 325 MG tablet, Take 2 tablets (650 mg total) by mouth every 6 (six) hours as needed for Pain or Temperature greater than (100.3)., Disp: 30 tablet, Rfl: 0    albuterol (PROVENTIL/VENTOLIN HFA)  90 mcg/actuation inhaler, Inhale 2 puffs into the lungs every 6 (six) hours as needed for Wheezing. Rescue, Disp: 18 g, Rfl: 5    ascorbic acid, vitamin C, (VITAMIN C) 500 MG tablet, Take 500 mg by mouth once daily., Disp: , Rfl:     aspirin (ECOTRIN) 81 MG EC tablet, Take 81 mg by mouth once daily., Disp: , Rfl:     atorvastatin (LIPITOR) 40 MG tablet, Take 1 tablet (40 mg total) by mouth once daily., Disp: 90 tablet, Rfl: 1    blood pressure test kit-large Kit, 1 Device by Misc.(Non-Drug; Combo Route) route 2 (two) times daily., Disp: 1 each, Rfl: 0    blood sugar diagnostic Strp, To check BG 2 times daily, to use with insurance preferred meter, Disp: 200 strip, Rfl: 3    blood-glucose meter kit, To check BG 2 times daily, to use with insurance preferred meter, Disp: 1 each, Rfl: 0    chlorhexidine (PERIDEX) 0.12 % solution, Use as directed 15 mLs in the mouth or throat 2 (two) times daily., Disp: , Rfl:     colchicine (COLCRYS) 0.6 mg tablet, Take 1 tablet (0.6 mg total) by mouth daily as needed., Disp: 90 tablet, Rfl: 2    colchicine (MITIGARE) 0.6 mg Cap, Take by mouth., Disp: , Rfl:     dicyclomine (BENTYL) 20 mg tablet, TAKE 1 TABLET(20 MG) BY MOUTH THREE TIMES DAILY AS NEEDED FOR ABDOMINAL PAIN, Disp: 60 tablet, Rfl: 0    diltiaZEM (CARDIZEM CD) 240 MG 24 hr capsule, Take 1 capsule (240 mg total) by mouth once daily., Disp: 90 capsule, Rfl: 2    diphenoxylate-atropine 2.5-0.025 mg (LOMOTIL) 2.5-0.025 mg per tablet, Take 1 tablet by mouth 4 (four) times daily as needed for Diarrhea., Disp: 60 tablet, Rfl: 3    fluticasone-salmeterol diskus inhaler 500-50 mcg, Inhale 1 puff into the lungs 2 (two) times daily. Controller, Disp: 60 each, Rfl: 11    gabapentin (NEURONTIN) 600 MG tablet, Take 1 tablet (600 mg total) by mouth 3 (three) times daily., Disp: 90 tablet, Rfl: 9    hydroCHLOROthiazide (HYDRODIURIL) 25 MG tablet, Take 1 tablet (25 mg total) by mouth once daily., Disp: 90 tablet, Rfl: 3    lancets Misc, To  "check BG 2 times daily, to use with insurance preferred meter, Disp: 200 each, Rfl: 3    polyethylene glycol (GOLYTELY) 236-22.74-6.74 -5.86 gram suspension, Take 4,000 mLs by mouth once., Disp: , Rfl:     PREVIDENT 5000 SENSITIVE 1.1-5 % Pste, use as directed, Disp: , Rfl:     tiZANidine (ZANAFLEX) 4 MG tablet, Take 1 tablet (4 mg total) by mouth nightly as needed., Disp: 90 tablet, Rfl: 1    vitamin D (VITAMIN D3) 1000 units Tab, Take 1,000 Units by mouth once daily., Disp: , Rfl:    Objective:      Vitals:    07/06/23 0935   BP: 118/72   BP Location: Left arm   Patient Position: Sitting   BP Method: Small (Manual)   Pulse: 88   Resp: 18   Temp: 97.7 °F (36.5 °C)   TempSrc: Oral   SpO2: 97%   Weight: 127.4 kg (280 lb 13.9 oz)   Height: 5' 6" (1.676 m)       Physical Exam  Constitutional:       General: He is not in acute distress.     Appearance: He is not diaphoretic.   HENT:      Head: Normocephalic and atraumatic.   Eyes:      Conjunctiva/sclera: Conjunctivae normal.   Pulmonary:      Effort: Pulmonary effort is normal.   Musculoskeletal:      Cervical back: Neck supple.   Skin:     Findings: No rash.   Neurological:      Mental Status: He is alert and oriented to person, place, and time.   Psychiatric:         Behavior: Behavior normal.         Thought Content: Thought content normal.         Judgment: Judgment normal.          Assessment:       1. C. difficile diarrhea    2. Essential hypertension    3. Mixed hyperlipidemia    4. Chronic kidney disease, stage 3a        Plan:       C. difficile diarrhea    Essential hypertension    Mixed hyperlipidemia    Chronic kidney disease, stage 3a    C diff seems to be resolved.  Follow-up with colonoscopy.  Continue other meds.          Future Appointments   Date Time Provider Department Center   8/8/2023  3:00 PM PRE-ADMIT, ENDO -[facility] ENDOPP4 [facility]   9/18/2023  8:00 AM LAB, [facility] [place]   9/21/2023  3:00 PM [facility]       Patient note was created using [employer].  Any errors in syntax or even information may not have been identified and edited on initial review prior to signing this note.

## 2023-07-06 NOTE — PROGRESS NOTES
Health Maintenance Due   Topic     Shingles Vaccine (1 of 2)  hx chicken pox. Notified pt can get vaccine at pharmacy    Colorectal Cancer Screening  Pending order

## 2023-07-25 ENCOUNTER — TELEPHONE (OUTPATIENT)
Dept: ENDOSCOPY | Facility: HOSPITAL | Age: 73
End: 2023-07-25
Payer: MEDICARE

## 2023-07-25 NOTE — TELEPHONE ENCOUNTER
Spoke with patient Review prep instruction confirm date and time of procedure   Patient verbalized understanding

## 2023-07-27 ENCOUNTER — ANESTHESIA EVENT (OUTPATIENT)
Dept: ENDOSCOPY | Facility: HOSPITAL | Age: 73
End: 2023-07-27
Payer: MEDICARE

## 2023-07-27 ENCOUNTER — TELEPHONE (OUTPATIENT)
Dept: ENDOSCOPY | Facility: HOSPITAL | Age: 73
End: 2023-07-27
Payer: MEDICARE

## 2023-07-27 NOTE — TELEPHONE ENCOUNTER
Medical assistant returned patient call that was left on voicemail no answer left voicemail asking patient to call us back @ [phone]

## 2023-07-28 ENCOUNTER — ANESTHESIA (OUTPATIENT)
Dept: ENDOSCOPY | Facility: HOSPITAL | Age: 73
End: 2023-07-28
Payer: MEDICARE

## 2023-07-28 ENCOUNTER — HOSPITAL ENCOUNTER (OUTPATIENT)
Facility: HOSPITAL | Age: 73
Discharge: HOME | End: 2023-07-28
Attending: INTERNAL MEDICINE | Admitting: INTERNAL MEDICINE
Payer: MEDICARE

## 2023-07-28 VITALS
SYSTOLIC BLOOD PRESSURE: 124 MMHG | HEART RATE: 82 BPM | TEMPERATURE: 98 F | DIASTOLIC BLOOD PRESSURE: 70 MMHG | OXYGEN SATURATION: 97 % | RESPIRATION RATE: 17 BRPM

## 2023-07-28 PROCEDURE — 37000008 HC ANESTHESIA 1ST 15 MINUTES: Performed by: INTERNAL MEDICINE

## 2023-07-28 PROCEDURE — 88305 TISSUE EXAM BY PATHOLOGIST: ICD-10-PCS | Mod: 26,,, | Performed by: PATHOLOGY

## 2023-07-28 PROCEDURE — 45385 COLONOSCOPY W/LESION REMOVAL: CPT | Performed by: INTERNAL MEDICINE

## 2023-07-28 PROCEDURE — D9220A PRA ANESTHESIA: Mod: ANES,,, | Performed by: ANESTHESIOLOGY

## 2023-07-28 PROCEDURE — 88305 TISSUE EXAM BY PATHOLOGIST: CPT | Performed by: PATHOLOGY

## 2023-07-28 PROCEDURE — 88305 TISSUE EXAM BY PATHOLOGIST: CPT | Mod: 26,,, | Performed by: PATHOLOGY

## 2023-07-28 PROCEDURE — 45385 COLONOSCOPY W/LESION REMOVAL: CPT | Mod: ,,, | Performed by: INTERNAL MEDICINE

## 2023-07-28 PROCEDURE — 43235 EGD DIAGNOSTIC BRUSH WASH: CPT | Performed by: INTERNAL MEDICINE

## 2023-07-28 PROCEDURE — 45385 PR COLONOSCOPY,REMV LESN,SNARE: ICD-10-PCS | Mod: ,,, | Performed by: INTERNAL MEDICINE

## 2023-07-28 PROCEDURE — 25000003 PHARM REV CODE 250: Performed by: ANESTHESIOLOGY

## 2023-07-28 PROCEDURE — 37000009 HC ANESTHESIA EA ADD 15 MINS: Performed by: INTERNAL MEDICINE

## 2023-07-28 PROCEDURE — 43235 EGD DIAGNOSTIC BRUSH WASH: CPT | Mod: 51,,, | Performed by: INTERNAL MEDICINE

## 2023-07-28 PROCEDURE — D9220A PRA ANESTHESIA: ICD-10-PCS | Mod: CRNA,,, | Performed by: NURSE ANESTHETIST, CERTIFIED REGISTERED

## 2023-07-28 PROCEDURE — 63600175 PHARM REV CODE 636 W HCPCS: Performed by: NURSE ANESTHETIST, CERTIFIED REGISTERED

## 2023-07-28 PROCEDURE — D9220A PRA ANESTHESIA: ICD-10-PCS | Mod: ANES,,, | Performed by: ANESTHESIOLOGY

## 2023-07-28 PROCEDURE — 43235 PR EGD, FLEX, DIAGNOSTIC: ICD-10-PCS | Mod: 51,,, | Performed by: INTERNAL MEDICINE

## 2023-07-28 PROCEDURE — 25000003 PHARM REV CODE 250: Performed by: NURSE ANESTHETIST, CERTIFIED REGISTERED

## 2023-07-28 PROCEDURE — 27201089 HC SNARE, DISP (ANY): Performed by: INTERNAL MEDICINE

## 2023-07-28 PROCEDURE — D9220A PRA ANESTHESIA: Mod: CRNA,,, | Performed by: NURSE ANESTHETIST, CERTIFIED REGISTERED

## 2023-07-28 RX ORDER — SODIUM CHLORIDE 9 MG/ML
INJECTION, SOLUTION INTRAVENOUS CONTINUOUS
Status: DISCONTINUED | OUTPATIENT
Start: 2023-07-28 | End: 2023-07-28 | Stop reason: HOSPADM

## 2023-07-28 RX ORDER — PROPOFOL 10 MG/ML
INJECTION, EMULSION INTRAVENOUS
Status: DISCONTINUED
Start: 2023-07-28 | End: 2023-07-28 | Stop reason: HOSPADM

## 2023-07-28 RX ORDER — PROPOFOL 10 MG/ML
VIAL (ML) INTRAVENOUS
Status: DISCONTINUED | OUTPATIENT
Start: 2023-07-28 | End: 2023-07-28

## 2023-07-28 RX ORDER — LIDOCAINE HYDROCHLORIDE 20 MG/ML
INJECTION INTRAVENOUS
Status: DISCONTINUED | OUTPATIENT
Start: 2023-07-28 | End: 2023-07-28

## 2023-07-28 RX ORDER — LIDOCAINE HYDROCHLORIDE 10 MG/ML
1 INJECTION, SOLUTION EPIDURAL; INFILTRATION; INTRACAUDAL; PERINEURAL ONCE
Status: DISCONTINUED | OUTPATIENT
Start: 2023-07-28 | End: 2023-07-28 | Stop reason: HOSPADM

## 2023-07-28 RX ORDER — LIDOCAINE HYDROCHLORIDE 20 MG/ML
INJECTION, SOLUTION EPIDURAL; INFILTRATION; INTRACAUDAL; PERINEURAL
Status: DISCONTINUED
Start: 2023-07-28 | End: 2023-07-28 | Stop reason: HOSPADM

## 2023-07-28 RX ADMIN — PROPOFOL 40 MG: 10 INJECTION, EMULSION INTRAVENOUS at 11:07

## 2023-07-28 RX ADMIN — PROPOFOL 20 MG: 10 INJECTION, EMULSION INTRAVENOUS at 11:07

## 2023-07-28 RX ADMIN — PROPOFOL 80 MG: 10 INJECTION, EMULSION INTRAVENOUS at 11:07

## 2023-07-28 RX ADMIN — SODIUM CHLORIDE: 0.9 INJECTION, SOLUTION INTRAVENOUS at 10:07

## 2023-07-28 RX ADMIN — LIDOCAINE HYDROCHLORIDE 100 MG: 20 INJECTION, SOLUTION INTRAVENOUS at 11:07

## 2023-07-28 NOTE — ANESTHESIA POSTPROCEDURE EVALUATION
Anesthesia Post Evaluation    Patient: Bj Pate Jr.    Procedure(s) Performed: Procedure(s) (LRB):  EGD (ESOPHAGOGASTRODUODENOSCOPY) (N/A)  COLONOSCOPY (N/A)    Final Anesthesia Type: general      Patient location during evaluation: GI PACU  Patient participation: Yes- Able to Participate  Level of consciousness: awake and alert, oriented and awake  Post-procedure vital signs: reviewed and stable  Airway patency: patent    PONV status at discharge: No PONV  Anesthetic complications: no      Cardiovascular status: blood pressure returned to baseline  Respiratory status: unassisted, spontaneous ventilation and room air  Hydration status: euvolemic  Follow-up not needed.          Vitals Value Taken Time   /64 07/28/23 1153   Temp 36.5 °C (97.7 °F) 07/28/23 1138   Pulse 83 07/28/23 1153   Resp 19 07/28/23 1153   SpO2 98 % 07/28/23 1153         No case tracking events are documented in the log.      Pain/Matthew Score: Matthew Score: 10 (7/28/2023 11:53 AM)

## 2023-07-28 NOTE — PROVATION PATIENT INSTRUCTIONS
Discharge Summary/Instructions after an Endoscopic Procedure  Patient Name: Bj Pate  Patient MRN: [id number]  Patient YOB: 1950 Friday, July 28, 2023  Jaylene Alvarado MD  Dear patient,  As a result of recent federal legislation (The Federal Cures Act), you may   receive lab or pathology results from your procedure in your [facility]   account before your physician is able to contact you. Your physician or   their representative will relay the results to you with their   recommendations at their soonest availability.  Thank you,  RESTRICTIONS:  During your procedure today, you received medications for sedation.  These   medications may affect your judgment, balance and coordination.  Therefore,   for 24 hours, you have the following restrictions:   - DO NOT drive a car, operate machinery, make legal/financial decisions,   sign important papers or drink alcohol.    ACTIVITY:  Today: no heavy lifting, straining or running due to procedural   sedation/anesthesia.  The following day: return to full activity including work.  DIET:  Eat and drink normally unless instructed otherwise.     TREATMENT FOR COMMON SIDE EFFECTS:  - Mild abdominal pain, nausea, belching, bloating or excessive gas:  rest,   eat lightly and use a heating pad.  - Sore Throat: treat with throat lozenges and/or gargle with warm salt   water.  - Because air was used during the procedure, expelling large amounts of air   from your rectum or belching is normal.  - If a bowel prep was taken, you may not have a bowel movement for 1-3 days.    This is normal.  SYMPTOMS TO WATCH FOR AND REPORT TO YOUR PHYSICIAN:  1. Abdominal pain or bloating, other than gas cramps.  2. Chest pain.  3. Back pain.  4. Signs of infection such as: chills or fever occurring within 24 hours   after the procedure.  5. Rectal bleeding, which would show as bright red, maroon, or black stools.   (A tablespoon of blood from the rectum is not serious, especially if    hemorrhoids are present.)  6. Vomiting.  7. Weakness or dizziness.  GO DIRECTLY TO THE NEAREST EMERGENCY ROOM IF YOU HAVE ANY OF THE FOLLOWING:      Difficulty breathing              Chills and/or fever over 101 F   Persistent vomiting and/or vomiting blood   Severe abdominal pain   Severe chest pain   Black, tarry stools   Bleeding- more than one tablespoon   Any other symptom or condition that you feel may need urgent attention  Your doctor recommends these additional instructions:  If any biopsies were taken, your doctors clinic will contact you in 1 to 2   weeks with any results.  - Proceed to colonoscopy.   - Resume previous diet.   - Continue present medications.  For questions, problems or results please call your physician - Jaylene Alvarado MD at Work:  [phone].  [facility] can be reached at [phone]     IF A COMPLICATION OR EMERGENCY SITUATION ARISES AND YOU ARE UNABLE TO REACH   YOUR PHYSICIAN - GO DIRECTLY TO THE EMERGENCY ROOM.  Jaylene Alvarado MD  7/28/2023 11:11:46 AM  This report has been verified and signed electronically.  Dear patient,  As a result of recent federal legislation (The Federal Cures Act), you may   receive lab or pathology results from your procedure in your [facility]   account before your physician is able to contact you. Your physician or   their representative will relay the results to you with their   recommendations at their soonest availability.  Thank you,  PROVATION

## 2023-07-28 NOTE — H&P
Short Stay Endoscopy History and Physical    PCP - Jose Antonio Foster MD    Procedure - EGD/Colonoscopy  ASA - per anesthesia  Mallampati - per anesthesia  History of Anesthesia problems - no  Family history Anesthesia problems -  no   Plan of anesthesia - MAC    HPI:  This is a 73 y.o. male here for evaluation of :     EGD - diarrhea  Colon - diarrhea    ROS:  Constitutional: No fevers, chills, No weight loss  CV: No chest pain  Pulm: No cough, No shortness of breath  Ophtho: No vision changes  GI: see HPI  Derm: No rash    Medical History:  has a past medical history of Anemia (06/04/2021), Anticoagulant long-term use, Basal ganglia hemorrhage, Benign hypertension with CKD (chronic kidney disease) stage III ( ), Cataract, Chronic idiopathic gout of multiple sites, Chronic kidney disease, stage 3, COPD (chronic obstructive pulmonary disease), Erectile dysfunction, Gout, Hemorrhoids without complication, Hyperlipidemia, Morbid obesity, Obstructive sleep apnea on CPAP, Reactive airway disease without complication (11/12/2021), Stroke (2016, 2006), Thalamic infarct, acute (right) (01/2016), and 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    Surgical History:  has a past surgical history that includes No past surgeries; Epidural steroid injection (N/A, 5/2/2019); Epidural steroid injection (Bilateral, 5/29/2019); Epidural steroid injection (Bilateral, 7/3/2019); Epidural steroid injection (N/A, 8/7/2019); Carpal tunnel release (Left, 2/19/2020); and Left heart catheterization (Left, 9/21/2021).    Family History: family history includes Diabetes in his paternal grandfather and unknown relative; Heart disease in his paternal grandfather; Hypertension in his unknown relative; No Known Problems in his brother, father, maternal aunt, maternal grandfather, maternal grandmother, maternal uncle, mother, paternal aunt, paternal grandmother, paternal uncle, and sister.. Otherwise no colon cancer, inflammatory bowel disease, or  GI malignancies.    Social History:  reports that he has never smoked. He has never used smokeless tobacco. He reports current alcohol use. He reports that he does not use drugs.    Review of patient's allergies indicates:   Allergen Reactions    Tomato (solanum lycopersicum) Hives    Naproxen Hives    Shrimp Other (See Comments)       Medications:   Medications Prior to Admission   Medication Sig Dispense Refill Last Dose    acetaminophen (TYLENOL) 325 MG tablet Take 2 tablets (650 mg total) by mouth every 6 (six) hours as needed for Pain or Temperature greater than (100.3). 30 tablet 0     albuterol (PROVENTIL/VENTOLIN HFA) 90 mcg/actuation inhaler Inhale 2 puffs into the lungs every 6 (six) hours as needed for Wheezing. Rescue 18 g 5     ascorbic acid, vitamin C, (VITAMIN C) 500 MG tablet Take 500 mg by mouth once daily.       aspirin (ECOTRIN) 81 MG EC tablet Take 81 mg by mouth once daily.       atorvastatin (LIPITOR) 40 MG tablet Take 1 tablet (40 mg total) by mouth once daily. 90 tablet 1     blood pressure test kit-large Kit 1 Device by Misc.(Non-Drug; Combo Route) route 2 (two) times daily. 1 each 0     blood sugar diagnostic Strp To check BG 2 times daily, to use with insurance preferred meter 200 strip 3     blood-glucose meter kit To check BG 2 times daily, to use with insurance preferred meter 1 each 0     chlorhexidine (PERIDEX) 0.12 % solution Use as directed 15 mLs in the mouth or throat 2 (two) times daily.       colchicine (COLCRYS) 0.6 mg tablet Take 1 tablet (0.6 mg total) by mouth daily as needed. 90 tablet 2     colchicine (MITIGARE) 0.6 mg Cap Take by mouth.       dicyclomine (BENTYL) 20 mg tablet TAKE 1 TABLET(20 MG) BY MOUTH THREE TIMES DAILY AS NEEDED FOR ABDOMINAL PAIN 60 tablet 0     diltiaZEM (CARDIZEM CD) 240 MG 24 hr capsule Take 1 capsule (240 mg total) by mouth once daily. 90 capsule 2     diphenoxylate-atropine 2.5-0.025 mg (LOMOTIL) 2.5-0.025 mg per tablet Take 1 tablet by mouth 4  (four) times daily as needed for Diarrhea. 60 tablet 3     fluticasone-salmeterol diskus inhaler 500-50 mcg Inhale 1 puff into the lungs 2 (two) times daily. Controller 60 each 11     gabapentin (NEURONTIN) 600 MG tablet Take 1 tablet (600 mg total) by mouth 3 (three) times daily. 90 tablet 9     hydroCHLOROthiazide (HYDRODIURIL) 25 MG tablet Take 1 tablet (25 mg total) by mouth once daily. 90 tablet 3     lancets Misc To check BG 2 times daily, to use with insurance preferred meter 200 each 3     polyethylene glycol (GOLYTELY) 236-22.74-6.74 -5.86 gram suspension Take 4,000 mLs by mouth once.       PREVIDENT 5000 SENSITIVE 1.1-5 % Pste use as directed       tiZANidine (ZANAFLEX) 4 MG tablet Take 1 tablet (4 mg total) by mouth nightly as needed. 90 tablet 1     vitamin D (VITAMIN D3) 1000 units Tab Take 1,000 Units by mouth once daily.          Physical Exam:    Vital Signs: There were no vitals filed for this visit.    Gen: NAD, lying comfortably  HENT: NCAT, oropharynx clear  Eyes: anicteric sclerae, EOMI grossly  Neck: supple, no visible masses/goiter  Cardiac: RRR  Lungs: non-labored breathing  Abd: soft, NT/ND, normoactive BS  Ext: no LE edema, warm, well perfused  Skin: skin intact on exposed body parts, no visible rashes, lesions  Neuro: A&Ox4, neuro exam grossly intact, moves all extremities  Psych: appropriate mood, affect      Labs:  Lab Results   Component Value Date    WBC 6.85 04/06/2023    HGB 13.0 (L) 04/06/2023    HCT 39.2 (L) 04/06/2023     04/06/2023    CHOL 116 (L) 03/11/2022    TRIG 91 03/11/2022    HDL 51 03/11/2022    ALT 16 04/06/2023    AST 23 04/06/2023     04/06/2023    K 3.8 04/06/2023    CL 96 04/06/2023    CREATININE 1.4 04/06/2023    BUN 12 04/06/2023    CO2 25 04/06/2023    TSH 0.445 02/27/2020    PSA 0.57 05/11/2023    INR 1.0 09/20/2021    HGBA1C 6.1 (H) 03/07/2023       Plan:  EGD for diarrhea  Colonoscopy for diarrhea    I have explained the risks and benefits of  endoscopy procedures to the patient including but not limited to bleeding, perforation, infection, and death.  The patient was asked if they understand and allowed to ask any further questions to their satisfaction.    Jaylene Alvarado MD

## 2023-07-28 NOTE — TRANSFER OF CARE
Anesthesia Transfer of Care Note    Patient: Bj Pate Jr.    Procedure(s) Performed: Procedure(s) (LRB):  EGD (ESOPHAGOGASTRODUODENOSCOPY) (N/A)  COLONOSCOPY (N/A)    Patient location: GI    Anesthesia Type: general    Transport from OR: Transported from OR on room air with adequate spontaneous ventilation    Post pain: adequate analgesia    Post assessment: no apparent anesthetic complications and tolerated procedure well    Post vital signs: stable    Level of consciousness: awake    Nausea/Vomiting: no nausea/vomiting    Complications: none    Transfer of care protocol was followed      Last vitals:   Visit Vitals  /63 (BP Location: Left arm, Patient Position: Lying)   Pulse 75   Temp 36.5 °C (97.7 °F) (Oral)   Resp 20   SpO2 100%

## 2023-07-28 NOTE — PROVATION PATIENT INSTRUCTIONS
Discharge Summary/Instructions after an Endoscopic Procedure  Patient Name: Bj Pate  Patient MRN: [id number]  Patient YOB: 1950 Friday, July 28, 2023  Jaylene Alvarado MD  Dear patient,  As a result of recent federal legislation (The Federal Cures Act), you may   receive lab or pathology results from your procedure in your [facility]   account before your physician is able to contact you. Your physician or   their representative will relay the results to you with their   recommendations at their soonest availability.  Thank you,  RESTRICTIONS:  During your procedure today, you received medications for sedation.  These   medications may affect your judgment, balance and coordination.  Therefore,   for 24 hours, you have the following restrictions:   - DO NOT drive a car, operate machinery, make legal/financial decisions,   sign important papers or drink alcohol.    ACTIVITY:  Today: no heavy lifting, straining or running due to procedural   sedation/anesthesia.  The following day: return to full activity including work.  DIET:  Eat and drink normally unless instructed otherwise.     TREATMENT FOR COMMON SIDE EFFECTS:  - Mild abdominal pain, nausea, belching, bloating or excessive gas:  rest,   eat lightly and use a heating pad.  - Sore Throat: treat with throat lozenges and/or gargle with warm salt   water.  - Because air was used during the procedure, expelling large amounts of air   from your rectum or belching is normal.  - If a bowel prep was taken, you may not have a bowel movement for 1-3 days.    This is normal.  SYMPTOMS TO WATCH FOR AND REPORT TO YOUR PHYSICIAN:  1. Abdominal pain or bloating, other than gas cramps.  2. Chest pain.  3. Back pain.  4. Signs of infection such as: chills or fever occurring within 24 hours   after the procedure.  5. Rectal bleeding, which would show as bright red, maroon, or black stools.   (A tablespoon of blood from the rectum is not serious, especially if    hemorrhoids are present.)  6. Vomiting.  7. Weakness or dizziness.  GO DIRECTLY TO THE NEAREST EMERGENCY ROOM IF YOU HAVE ANY OF THE FOLLOWING:      Difficulty breathing              Chills and/or fever over 101 F   Persistent vomiting and/or vomiting blood   Severe abdominal pain   Severe chest pain   Black, tarry stools   Bleeding- more than one tablespoon   Any other symptom or condition that you feel may need urgent attention  Your doctor recommends these additional instructions:  If any biopsies were taken, your doctors clinic will contact you in 1 to 2   weeks with any results.  - Discharge patient to home.   - Resume previous diet.   - Continue present medications.   - Await pathology results.   - Repeat colonoscopy in 7 years for surveillance.  For questions, problems or results please call your physician - Jaylene Alvarado MD at Work:  [phone].  [facility] can be reached at [phone]     IF A COMPLICATION OR EMERGENCY SITUATION ARISES AND YOU ARE UNABLE TO REACH   YOUR PHYSICIAN - GO DIRECTLY TO THE EMERGENCY ROOM.  Jaylene Alvarado MD  7/28/2023 11:37:22 AM  This report has been verified and signed electronically.  Dear patient,  As a result of recent federal legislation (The Federal Cures Act), you may   receive lab or pathology results from your procedure in your [facility]   account before your physician is able to contact you. Your physician or   their representative will relay the results to you with their   recommendations at their soonest availability.  Thank you,  PROVATION

## 2023-07-28 NOTE — PLAN OF CARE
Procedure and recovery complete. Awake and alert. No c/o pain or discomfort. Resp. Even and unlabored. Discharge instructions given. Verbalized understanding. No acute distress noted.

## 2023-07-28 NOTE — ANESTHESIA PREPROCEDURE EVALUATION
07/28/2023  Bj Pate Jr. is a 73 y.o., male.  Past Medical History:   Diagnosis Date    Anemia 06/04/2021    Anticoagulant long-term use     Basal ganglia hemorrhage     Left basal ganglia hemorrhage with resultant right-sided hemiparesis which has resolved.     Benign hypertension with CKD (chronic kidney disease) stage III      Cataract     Chronic idiopathic gout of multiple sites     Chronic kidney disease, stage 3     COPD (chronic obstructive pulmonary disease)     Erectile dysfunction     Gout     Hemorrhoids without complication     Hyperlipidemia     Morbid obesity     Obstructive sleep apnea on CPAP     Reactive airway disease without complication 11/12/2021    Stroke 2016, 2006    Thalamic infarct, acute (right) 01/2016    Type 2 DM with CKD stage 3 and hypertension     On pravastatin for cardiovascular protection.          Pre-op Assessment    I have reviewed the Patient Summary Reports.     I have reviewed the Nursing Notes.    I have reviewed the Medications.     Review of Systems  Anesthesia Hx:  No problems with previous Anesthesia  Neg history of prior surgery. Denies Family Hx of Anesthesia complications.   Denies Personal Hx of Anesthesia complications.   Social:  Non-Smoker, No Alcohol Use    Hematology/Oncology:  Hematology Normal   Oncology Normal     EENT/Dental:EENT/Dental Normal   Cardiovascular:   Exercise tolerance: good Hypertension    Pulmonary:   COPD Sleep Apnea, CPAP    Renal/:   Chronic Renal Disease, CKD    Hepatic/GI:  Hepatic/GI Normal    Musculoskeletal:   Arthritis     Endocrine:   Diabetes, type 2  Obesity / BMI > 30  Dermatological:  Skin Normal    Psych:  Psychiatric Normal           Physical Exam  General: Well nourished    Airway:  Mallampati: II   Mouth Opening: Normal  Tongue: Normal  Neck ROM: Normal  ROM    Dental:  Edentulous    Chest/Lungs:  Clear to auscultation    Heart:  Rate: Normal  Rhythm: Regular Rhythm  Sounds: Normal        Anesthesia Plan  Type of Anesthesia, risks & benefits discussed:    Anesthesia Type: Gen Natural Airway  Intra-op Monitoring Plan: Standard ASA Monitors  Induction:  IV  Informed Consent: Informed consent signed with the Patient and all parties understand the risks and agree with anesthesia plan.  All questions answered.   ASA Score: 3    Ready For Surgery From Anesthesia Perspective.     .

## 2023-07-31 LAB
FINAL PATHOLOGIC DIAGNOSIS: NORMAL
GROSS: NORMAL
Lab: NORMAL

## 2023-08-08 ENCOUNTER — CLINICAL SUPPORT (OUTPATIENT)
Dept: ENDOSCOPY | Facility: HOSPITAL | Age: 73
End: 2023-08-08
Attending: FAMILY MEDICINE
Payer: MEDICARE

## 2023-08-08 DIAGNOSIS — Z12.11 ENCOUNTER FOR COLORECTAL CANCER SCREENING: ICD-10-CM

## 2023-08-08 DIAGNOSIS — Z12.12 ENCOUNTER FOR COLORECTAL CANCER SCREENING: ICD-10-CM

## 2023-08-08 NOTE — PLAN OF CARE
Contacted patient to schedule colonoscopy. Pt states he already had procedure done on 7/28/23 at [facility] with Dr ELVIS Alvarado. Same verified by writer and to repeat in seven years.

## 2023-08-26 ENCOUNTER — HOSPITAL ENCOUNTER (EMERGENCY)
Facility: HOSPITAL | Age: 73
Discharge: HOME | End: 2023-08-26
Attending: STUDENT IN AN ORGANIZED HEALTH CARE EDUCATION/TRAINING PROGRAM
Payer: MEDICARE

## 2023-08-26 VITALS
WEIGHT: 286 LBS | OXYGEN SATURATION: 98 % | HEART RATE: 86 BPM | TEMPERATURE: 98 F | HEIGHT: 69 IN | SYSTOLIC BLOOD PRESSURE: 105 MMHG | DIASTOLIC BLOOD PRESSURE: 69 MMHG | RESPIRATION RATE: 16 BRPM | BODY MASS INDEX: 42.36 KG/M2

## 2023-08-26 DIAGNOSIS — R10.9 ABDOMINAL PAIN: ICD-10-CM

## 2023-08-26 LAB
ALBUMIN SERPL BCP-MCNC: 2.8 G/DL (ref 3.5–5.2)
ALP SERPL-CCNC: 105 U/L (ref 55–135)
ALT SERPL W/O P-5'-P-CCNC: 11 U/L (ref 10–44)
ANION GAP SERPL CALC-SCNC: 10 MMOL/L (ref 8–16)
ANION GAP SERPL CALC-SCNC: 16 MMOL/L (ref 8–16)
AST SERPL-CCNC: 19 U/L (ref 10–40)
BACTERIA #/AREA URNS HPF: ABNORMAL /HPF
BASOPHILS # BLD AUTO: 0.03 K/UL (ref 0–0.2)
BASOPHILS NFR BLD: 0.4 % (ref 0–1.9)
BILIRUB SERPL-MCNC: 1.3 MG/DL (ref 0.1–1)
BILIRUB UR QL STRIP: NEGATIVE
BUN SERPL-MCNC: 15 MG/DL (ref 6–30)
BUN SERPL-MCNC: 15 MG/DL (ref 8–23)
CALCIUM SERPL-MCNC: 9 MG/DL (ref 8.7–10.5)
CHLORIDE SERPL-SCNC: 91 MMOL/L (ref 95–110)
CHLORIDE SERPL-SCNC: 94 MMOL/L (ref 95–110)
CLARITY UR: CLEAR
CO2 SERPL-SCNC: 30 MMOL/L (ref 23–29)
COLOR UR: YELLOW
CREAT SERPL-MCNC: 1.4 MG/DL (ref 0.5–1.4)
CREAT SERPL-MCNC: 1.4 MG/DL (ref 0.5–1.4)
DIFFERENTIAL METHOD: ABNORMAL
EOSINOPHIL # BLD AUTO: 0.1 K/UL (ref 0–0.5)
EOSINOPHIL NFR BLD: 1.6 % (ref 0–8)
ERYTHROCYTE [DISTWIDTH] IN BLOOD BY AUTOMATED COUNT: 17.1 % (ref 11.5–14.5)
EST. GFR  (NO RACE VARIABLE): 54 ML/MIN/1.73 M^2
GLUCOSE SERPL-MCNC: 96 MG/DL (ref 70–110)
GLUCOSE SERPL-MCNC: 96 MG/DL (ref 70–110)
GLUCOSE UR QL STRIP: NEGATIVE
HCT VFR BLD AUTO: 37.9 % (ref 40–54)
HCT VFR BLD CALC: 42 %PCV (ref 36–54)
HGB BLD-MCNC: 12.6 G/DL (ref 14–18)
HGB UR QL STRIP: NEGATIVE
HYALINE CASTS #/AREA URNS LPF: 16 /LPF
IMM GRANULOCYTES # BLD AUTO: 0.05 K/UL (ref 0–0.04)
IMM GRANULOCYTES NFR BLD AUTO: 0.6 % (ref 0–0.5)
KETONES UR QL STRIP: NEGATIVE
LEUKOCYTE ESTERASE UR QL STRIP: NEGATIVE
LIPASE SERPL-CCNC: 8 U/L (ref 4–60)
LYMPHOCYTES # BLD AUTO: 1.7 K/UL (ref 1–4.8)
LYMPHOCYTES NFR BLD: 21.1 % (ref 18–48)
MCH RBC QN AUTO: 29.8 PG (ref 27–31)
MCHC RBC AUTO-ENTMCNC: 33.2 G/DL (ref 32–36)
MCV RBC AUTO: 90 FL (ref 82–98)
MICROSCOPIC COMMENT: ABNORMAL
MONOCYTES # BLD AUTO: 1.2 K/UL (ref 0.3–1)
MONOCYTES NFR BLD: 14.6 % (ref 4–15)
NEUTROPHILS # BLD AUTO: 4.9 K/UL (ref 1.8–7.7)
NEUTROPHILS NFR BLD: 61.7 % (ref 38–73)
NITRITE UR QL STRIP: NEGATIVE
NRBC BLD-RTO: 0 /100 WBC
PH UR STRIP: 6 [PH] (ref 5–8)
PLATELET # BLD AUTO: 216 K/UL (ref 150–450)
PMV BLD AUTO: 9.4 FL (ref 9.2–12.9)
POC IONIZED CALCIUM: 1.18 MMOL/L (ref 1.06–1.42)
POC TCO2 (MEASURED): 31 MMOL/L (ref 23–29)
POTASSIUM BLD-SCNC: 3.5 MMOL/L (ref 3.5–5.1)
POTASSIUM SERPL-SCNC: 3.6 MMOL/L (ref 3.5–5.1)
PROT SERPL-MCNC: 7.5 G/DL (ref 6–8.4)
PROT UR QL STRIP: ABNORMAL
RBC # BLD AUTO: 4.23 M/UL (ref 4.6–6.2)
RBC #/AREA URNS HPF: 1 /HPF (ref 0–4)
SAMPLE: ABNORMAL
SODIUM BLD-SCNC: 133 MMOL/L (ref 136–145)
SODIUM SERPL-SCNC: 134 MMOL/L (ref 136–145)
SP GR UR STRIP: 1.03 (ref 1–1.03)
URN SPEC COLLECT METH UR: ABNORMAL
UROBILINOGEN UR STRIP-ACNC: ABNORMAL EU/DL
WBC # BLD AUTO: 7.88 K/UL (ref 3.9–12.7)
WBC #/AREA URNS HPF: 1 /HPF (ref 0–5)

## 2023-08-26 PROCEDURE — 84132 ASSAY OF SERUM POTASSIUM: CPT

## 2023-08-26 PROCEDURE — 81000 URINALYSIS NONAUTO W/SCOPE: CPT | Performed by: STUDENT IN AN ORGANIZED HEALTH CARE EDUCATION/TRAINING PROGRAM

## 2023-08-26 PROCEDURE — 25000003 PHARM REV CODE 250: Performed by: STUDENT IN AN ORGANIZED HEALTH CARE EDUCATION/TRAINING PROGRAM

## 2023-08-26 PROCEDURE — 80048 BASIC METABOLIC PNL TOTAL CA: CPT | Mod: XB

## 2023-08-26 PROCEDURE — 85014 HEMATOCRIT: CPT | Mod: 91

## 2023-08-26 PROCEDURE — 93010 EKG 12-LEAD: ICD-10-PCS | Mod: ,,, | Performed by: INTERNAL MEDICINE

## 2023-08-26 PROCEDURE — 99900035 HC TECH TIME PER 15 MIN (STAT)

## 2023-08-26 PROCEDURE — 83690 ASSAY OF LIPASE: CPT | Performed by: STUDENT IN AN ORGANIZED HEALTH CARE EDUCATION/TRAINING PROGRAM

## 2023-08-26 PROCEDURE — 85025 COMPLETE CBC W/AUTO DIFF WBC: CPT | Performed by: STUDENT IN AN ORGANIZED HEALTH CARE EDUCATION/TRAINING PROGRAM

## 2023-08-26 PROCEDURE — 82330 ASSAY OF CALCIUM: CPT

## 2023-08-26 PROCEDURE — 84295 ASSAY OF SERUM SODIUM: CPT | Mod: 91

## 2023-08-26 PROCEDURE — 25500020 PHARM REV CODE 255: Performed by: STUDENT IN AN ORGANIZED HEALTH CARE EDUCATION/TRAINING PROGRAM

## 2023-08-26 PROCEDURE — 93010 ELECTROCARDIOGRAM REPORT: CPT | Mod: ,,, | Performed by: INTERNAL MEDICINE

## 2023-08-26 PROCEDURE — 80053 COMPREHEN METABOLIC PANEL: CPT | Performed by: STUDENT IN AN ORGANIZED HEALTH CARE EDUCATION/TRAINING PROGRAM

## 2023-08-26 PROCEDURE — 82565 ASSAY OF CREATININE: CPT | Mod: 91

## 2023-08-26 PROCEDURE — 99285 EMERGENCY DEPT VISIT HI MDM: CPT | Mod: 25

## 2023-08-26 PROCEDURE — 93005 ELECTROCARDIOGRAM TRACING: CPT

## 2023-08-26 RX ORDER — HYDROCODONE BITARTRATE AND ACETAMINOPHEN 5; 325 MG/1; MG/1
1 TABLET ORAL
Status: COMPLETED | OUTPATIENT
Start: 2023-08-26 | End: 2023-08-26

## 2023-08-26 RX ORDER — HYDROCODONE BITARTRATE AND ACETAMINOPHEN 5; 325 MG/1; MG/1
1 TABLET ORAL EVERY 6 HOURS PRN
Qty: 12 TABLET | Refills: 0 | Status: SHIPPED | OUTPATIENT
Start: 2023-08-26 | End: 2023-08-29

## 2023-08-26 RX ADMIN — HYDROCODONE BITARTRATE AND ACETAMINOPHEN 1 TABLET: 5; 325 TABLET ORAL at 07:08

## 2023-08-26 RX ADMIN — IOHEXOL 100 ML: 350 INJECTION, SOLUTION INTRAVENOUS at 06:08
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
## 2023-08-27 NOTE — DISCHARGE INSTRUCTIONS
Thank you for coming to our Emergency Department today. It is important to remember that some problems or medical conditions are difficult to diagnose and may not be found during your Emergency Department visit.     Be sure to follow up with your primary care doctor and review all labs/imaging/tests that were performed during your ER visit with them. Some labs/tests may be outside of the normal range and require non-emergent follow-up and further investigation to help diagnose/exclude/prevent complications or other potentially serious medical conditions that were not addressed during your ER visit.    If you do not have a primary care doctor, you may contact the one listed on your discharge paperwork or you may also call the [facility] Appointment Desk at [phone] to schedule an appointment and establish care with one. It is important to your health that you have a primary care doctor.    Please take all medications as directed. All medications may potentially have side-effects and it is impossible to predict which medications may give you side-effects or what side-effects (if any) they will give you.. If you feel that you are having a negative effect or side-effect of any medication you should immediately stop taking them and seek medical attention. If you feel that you are having a life-threatening reaction call 911.    Return to the ER with any questions/concerns, new/concerning symptoms, worsening or failure to improve.     Do not drive, swim, climb to height, take a bath, operate heavy machinery, drink alcohol or take potentially sedating medications, sign any legal documents or make any important decisions for 24 hours if you have received any pain medications, sedatives or mood altering drugs during your ER visit or within 24 hours of taking them if they have been prescribed to you.     You can find additional resources for Dentists, hearing aids, durable medical equipment, low cost pharmacies and  other resources at [email or web address].org    BELOW THIS LINE ONLY APPLIES IF YOU HAVE A COVID TEST PENDING OR IF YOU HAVE BEEN DIAGNOSED WITH COVID:  Please access [facility] to review the results of your test. Until the results of your COVID test return, you should isolate yourself so as not to potentially spread illness to others.   If your COVID test returns positive, you should isolate yourself so as not to spread illness to others. After five full days, if you are feeling better and you have not had fever for 24 hours, you can return to your typical daily activities, but you must wear a mask around others for an additional 5 days.   If your COVID test returns negative and you are either unvaccinated or more than six months out from your two-dose vaccine and are not yet boosted, you should still quarantine for 5 full days followed by strict mask use for an additional 5 full days.   If your COVID test returns negative and you have received your 2-dose initial vaccine as well as a booster, you should continue strict mask use for 10 full days after the exposure.  For all those exposed, best practice includes a test at day 5 after the exposure. This can be a home test or a test through one of the many testing centers throughout our community.   Masking is always advised to limit the spread of COVID. Cdc.gov is an excellent site to obtain the latest up to date recommendations regarding COVID and COVID testing.     CDC Testing and Quarantine Guidelines for patients with exposure to a known-positive COVID-19 person:  A close exposure is defined as anyone who has had an exposure (masked or unmasked) to a known COVID -19 positive person within 6 feet of someone for a cumulative total of 15 minutes or more over a 24-hour period.   Vaccinated and/or if you recently had a positive covid test within 90 days do NOT need to quarantine after contact with someone who had COVID-19 unless you develop symptoms.   Fully vaccinated  people who have not had a positive test within 90 days, should get tested 3-5 days after their exposure, even if they don't have symptoms and wear a mask indoors in public for 14 days following exposure or until their test result is negative.      Unvaccinated and/or NOT had a positive test within 90 days and meet close exposure  You are required by CDC guidelines to quarantine for at least 5 days from time of exposure followed by 5 days of strict masking. It is recommended, but not required to test after 5 days, unless you develop symptoms, in which case you should test at that time.  If you get tested after 5 days and your test is positive, your 5 day period of isolation starts the day of the positive test.    If your exposure does not meet the above definition, you can return to your normal daily activities to include social distancing, wearing a mask and frequent handwashing.      Here is a link to guidance from the CDC:  https://www.cdc.gov/media/releases/2021/s1227-isolation-quarantine-guidance.html      [place] Dept Of Health Testing Sites:  https://ldh.[place].gov/page/3934      [facility] website with testing locations and guidance:  [email or web address]

## 2023-08-28 ENCOUNTER — TELEPHONE (OUTPATIENT)
Dept: FAMILY MEDICINE | Facility: CLINIC | Age: 73
End: 2023-08-28
Payer: MEDICARE

## 2023-08-28 ENCOUNTER — PATIENT OUTREACH (OUTPATIENT)
Dept: EMERGENCY MEDICINE | Facility: HOSPITAL | Age: 73
End: 2023-08-28
Payer: MEDICARE

## 2023-08-28 NOTE — TELEPHONE ENCOUNTER
----- Message from Berta Mandel sent at 8/28/2023 12:00 PM CDT -----  Regarding: Post ED visit follow up appt within 7 days of D/C date  Good afternoon: Pt was seen in the ED on 8/26/23 for abdominal pain. Pt requires a Post ED visit follow up appt within 7 days. Please contact pt to schedule a follow up appt by 9/2/23 if possible. Pt also needs a referral to general surgeon as he was referred to Dr. Robert Agustin and requires a physician closer to his home address.     Thank you  Berta Mandel

## 2023-08-30 NOTE — PROGRESS NOTES
I spoke with pt regarding ED visit on 8/26/23. Pt states that he is doing alright. Pt was referred to general surgeon, but would like one closer to his home. Pt requires a Post ED visit follow up with pcp within 7 days of d/c. I sent pcp a request for an appt within 7 days and a referral to a surgeon closer to his home. Pt was scheduled a follow up appt on 9/6/23 @ 9:00 a.m.  Pt has no additional needs at this time. Pt was scheduled an appt reminder.     Berta Mandel

## 2023-09-01 ENCOUNTER — PATIENT OUTREACH (OUTPATIENT)
Dept: ADMINISTRATIVE | Facility: HOSPITAL | Age: 73
End: 2023-09-01
Payer: MEDICARE

## 2023-09-01 DIAGNOSIS — R73.03 PREDIABETES: Primary | ICD-10-CM

## 2023-09-01 NOTE — PROGRESS NOTES

## 2023-09-06 ENCOUNTER — OFFICE VISIT (OUTPATIENT)
Dept: FAMILY MEDICINE | Facility: CLINIC | Age: 73
End: 2023-09-06
Payer: MEDICARE

## 2023-09-06 ENCOUNTER — LAB VISIT (OUTPATIENT)
Dept: LAB | Facility: HOSPITAL | Age: 73
End: 2023-09-06
Attending: FAMILY MEDICINE
Payer: MEDICARE

## 2023-09-06 VITALS
TEMPERATURE: 98 F | SYSTOLIC BLOOD PRESSURE: 114 MMHG | DIASTOLIC BLOOD PRESSURE: 70 MMHG | WEIGHT: 250.25 LBS | OXYGEN SATURATION: 98 % | BODY MASS INDEX: 37.06 KG/M2 | HEIGHT: 69 IN | RESPIRATION RATE: 18 BRPM | HEART RATE: 89 BPM

## 2023-09-06 DIAGNOSIS — R73.03 PREDIABETES: ICD-10-CM

## 2023-09-06 DIAGNOSIS — R10.30 LOWER ABDOMINAL PAIN: Primary | ICD-10-CM

## 2023-09-06 DIAGNOSIS — G56.23 ULNAR NEUROPATHY OF BOTH UPPER EXTREMITIES: ICD-10-CM

## 2023-09-06 DIAGNOSIS — Z23 NEEDS FLU SHOT: ICD-10-CM

## 2023-09-06 DIAGNOSIS — E78.5 HYPERLIPIDEMIA, ACQUIRED: ICD-10-CM

## 2023-09-06 DIAGNOSIS — M47.22 OSTEOARTHRITIS OF SPINE WITH RADICULOPATHY, CERVICAL REGION: ICD-10-CM

## 2023-09-06 DIAGNOSIS — I10 ESSENTIAL HYPERTENSION: ICD-10-CM

## 2023-09-06 DIAGNOSIS — M51.36 DDD (DEGENERATIVE DISC DISEASE), LUMBAR: ICD-10-CM

## 2023-09-06 LAB
ALBUMIN SERPL BCP-MCNC: 2.7 G/DL (ref 3.5–5.2)
ALP SERPL-CCNC: 80 U/L (ref 55–135)
ALT SERPL W/O P-5'-P-CCNC: 12 U/L (ref 10–44)
ANION GAP SERPL CALC-SCNC: 12 MMOL/L (ref 8–16)
AST SERPL-CCNC: 19 U/L (ref 10–40)
BILIRUB SERPL-MCNC: 0.5 MG/DL (ref 0.1–1)
BUN SERPL-MCNC: 16 MG/DL (ref 8–23)
CALCIUM SERPL-MCNC: 8.8 MG/DL (ref 8.7–10.5)
CHLORIDE SERPL-SCNC: 95 MMOL/L (ref 95–110)
CHOLEST SERPL-MCNC: 101 MG/DL (ref 120–199)
CHOLEST/HDLC SERPL: 1.7 {RATIO} (ref 2–5)
CO2 SERPL-SCNC: 28 MMOL/L (ref 23–29)
CREAT SERPL-MCNC: 1.7 MG/DL (ref 0.5–1.4)
EST. GFR  (NO RACE VARIABLE): 42.6 ML/MIN/1.73 M^2
ESTIMATED AVG GLUCOSE: 108 MG/DL (ref 68–131)
GLUCOSE SERPL-MCNC: 114 MG/DL (ref 70–110)
HBA1C MFR BLD: 5.4 % (ref 4–5.6)
HDLC SERPL-MCNC: 59 MG/DL (ref 40–75)
HDLC SERPL: 58.4 % (ref 20–50)
LDLC SERPL CALC-MCNC: 31.2 MG/DL (ref 63–159)
NONHDLC SERPL-MCNC: 42 MG/DL
POTASSIUM SERPL-SCNC: 3.4 MMOL/L (ref 3.5–5.1)
PROT SERPL-MCNC: 6.8 G/DL (ref 6–8.4)
SODIUM SERPL-SCNC: 135 MMOL/L (ref 136–145)
TRIGL SERPL-MCNC: 54 MG/DL (ref 30–150)

## 2023-09-06 PROCEDURE — 1159F MED LIST DOCD IN RCRD: CPT | Mod: CPTII,S$GLB,, | Performed by: FAMILY MEDICINE

## 2023-09-06 PROCEDURE — 90694 VACC AIIV4 NO PRSRV 0.5ML IM: CPT | Mod: S$GLB,,, | Performed by: FAMILY MEDICINE

## 2023-09-06 PROCEDURE — 3008F PR BODY MASS INDEX (BMI) DOCUMENTED: ICD-10-PCS | Mod: CPTII,S$GLB,, | Performed by: FAMILY MEDICINE

## 2023-09-06 PROCEDURE — 83036 HEMOGLOBIN GLYCOSYLATED A1C: CPT | Performed by: FAMILY MEDICINE

## 2023-09-06 PROCEDURE — 99214 PR OFFICE/OUTPT VISIT, EST, LEVL IV, 30-39 MIN: ICD-10-PCS | Mod: 25,S$GLB,, | Performed by: FAMILY MEDICINE

## 2023-09-06 PROCEDURE — G0008 ADMIN INFLUENZA VIRUS VAC: HCPCS | Mod: S$GLB,,, | Performed by: FAMILY MEDICINE

## 2023-09-06 PROCEDURE — 1101F PT FALLS ASSESS-DOCD LE1/YR: CPT | Mod: CPTII,S$GLB,, | Performed by: FAMILY MEDICINE

## 2023-09-06 PROCEDURE — 99999 PR PBB SHADOW E&M-EST. PATIENT-LVL V: CPT | Mod: PBBFAC,,, | Performed by: FAMILY MEDICINE

## 2023-09-06 PROCEDURE — 3288F FALL RISK ASSESSMENT DOCD: CPT | Mod: CPTII,S$GLB,, | Performed by: FAMILY MEDICINE

## 2023-09-06 PROCEDURE — 3078F DIAST BP <80 MM HG: CPT | Mod: CPTII,S$GLB,, | Performed by: FAMILY MEDICINE

## 2023-09-06 PROCEDURE — 3044F PR MOST RECENT HEMOGLOBIN A1C LEVEL <7.0%: ICD-10-PCS | Mod: CPTII,S$GLB,, | Performed by: FAMILY MEDICINE

## 2023-09-06 PROCEDURE — G0008 FLU VACCINE - QUADRIVALENT - ADJUVANTED: ICD-10-PCS | Mod: S$GLB,,, | Performed by: FAMILY MEDICINE

## 2023-09-06 PROCEDURE — 3074F SYST BP LT 130 MM HG: CPT | Mod: CPTII,S$GLB,, | Performed by: FAMILY MEDICINE

## 2023-09-06 PROCEDURE — 1125F AMNT PAIN NOTED PAIN PRSNT: CPT | Mod: CPTII,S$GLB,, | Performed by: FAMILY MEDICINE

## 2023-09-06 PROCEDURE — 99999 PR PBB SHADOW E&M-EST. PATIENT-LVL V: ICD-10-PCS | Mod: PBBFAC,,, | Performed by: FAMILY MEDICINE

## 2023-09-06 PROCEDURE — 80053 COMPREHEN METABOLIC PANEL: CPT | Performed by: FAMILY MEDICINE

## 2023-09-06 PROCEDURE — 1101F PR PT FALLS ASSESS DOC 0-1 FALLS W/OUT INJ PAST YR: ICD-10-PCS | Mod: CPTII,S$GLB,, | Performed by: FAMILY MEDICINE

## 2023-09-06 PROCEDURE — 3074F PR MOST RECENT SYSTOLIC BLOOD PRESSURE < 130 MM HG: ICD-10-PCS | Mod: CPTII,S$GLB,, | Performed by: FAMILY MEDICINE

## 2023-09-06 PROCEDURE — 1125F PR PAIN SEVERITY QUANTIFIED, PAIN PRESENT: ICD-10-PCS | Mod: CPTII,S$GLB,, | Performed by: FAMILY MEDICINE

## 2023-09-06 PROCEDURE — 80061 LIPID PANEL: CPT | Performed by: FAMILY MEDICINE

## 2023-09-06 PROCEDURE — 3288F PR FALLS RISK ASSESSMENT DOCUMENTED: ICD-10-PCS | Mod: CPTII,S$GLB,, | Performed by: FAMILY MEDICINE

## 2023-09-06 PROCEDURE — 90694 FLU VACCINE - QUADRIVALENT - ADJUVANTED: ICD-10-PCS | Mod: S$GLB,,, | Performed by: FAMILY MEDICINE

## 2023-09-06 PROCEDURE — 99214 OFFICE O/P EST MOD 30 MIN: CPT | Mod: 25,S$GLB,, | Performed by: FAMILY MEDICINE

## 2023-09-06 PROCEDURE — 1159F PR MEDICATION LIST DOCUMENTED IN MEDICAL RECORD: ICD-10-PCS | Mod: CPTII,S$GLB,, | Performed by: FAMILY MEDICINE

## 2023-09-06 PROCEDURE — 36415 COLL VENOUS BLD VENIPUNCTURE: CPT | Mod: PO | Performed by: FAMILY MEDICINE

## 2023-09-06 PROCEDURE — 3008F BODY MASS INDEX DOCD: CPT | Mod: CPTII,S$GLB,, | Performed by: FAMILY MEDICINE

## 2023-09-06 PROCEDURE — 3044F HG A1C LEVEL LT 7.0%: CPT | Mod: CPTII,S$GLB,, | Performed by: FAMILY MEDICINE

## 2023-09-06 PROCEDURE — 3078F PR MOST RECENT DIASTOLIC BLOOD PRESSURE < 80 MM HG: ICD-10-PCS | Mod: CPTII,S$GLB,, | Performed by: FAMILY MEDICINE

## 2023-09-06 RX ORDER — BNT162B2 ORIGINAL AND OMICRON BA.4/BA.5 .1125; .1125 MG/2.25ML; MG/2.25ML
INJECTION, SUSPENSION INTRAMUSCULAR
Status: ON HOLD | COMMUNITY
Start: 2023-06-14 | End: 2023-12-18 | Stop reason: CLARIF

## 2023-09-06 RX ORDER — ATORVASTATIN CALCIUM 40 MG/1
40 TABLET, FILM COATED ORAL DAILY
Qty: 90 TABLET | Refills: 1 | Status: SHIPPED | OUTPATIENT
Start: 2023-09-06

## 2023-09-06 RX ORDER — GABAPENTIN 600 MG/1
600 TABLET ORAL 3 TIMES DAILY PRN
Qty: 90 TABLET | Refills: 1 | Status: SHIPPED | OUTPATIENT
Start: 2023-09-06 | End: 2023-10-25 | Stop reason: SDUPTHER

## 2023-09-06 NOTE — PROGRESS NOTES
Subjective:       Patient ID: Bj Pate Jr. is a 73 y.o. male.    Chief Complaint: Follow-up      Follow-up      73-year-old male presents for ED follow-up.  Patient feels his abdominal pain has improved.  CT did not show active bowel issue.  Denies any fever chills.  States he has occasional constipation and diarrhea.      Review of Systems   Constitutional: Negative.    HENT: Negative.     Respiratory: Negative.     Cardiovascular: Negative.    Gastrointestinal: Negative.    Endocrine: Negative.    Genitourinary: Negative.    Musculoskeletal: Negative.    Neurological: Negative.    Psychiatric/Behavioral: Negative.            Past Medical History:   Diagnosis Date    Anemia 06/04/2021    Anticoagulant long-term use     Basal ganglia hemorrhage     Left basal ganglia hemorrhage with resultant right-sided hemiparesis which has resolved.     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      Past Surgical History:   Procedure Laterality Date    CARPAL TUNNEL RELEASE Left 2/19/2020    Procedure: RELEASE, CARPAL TUNNEL LEFT;  Surgeon: Maria Luisa Mccurdy MD;  Location: [facility] OR;  Service: Orthopedics;  Laterality: Left;    COLONOSCOPY N/A 7/28/2023    Procedure: COLONOSCOPY;  Surgeon: Jaylene Alvarado MD;  Location: [facility];  Service: Endoscopy;  Laterality: N/A;    EPIDURAL STEROID INJECTION N/A 5/2/2019    Procedure: Injection, Steroid, Epidural Cervical;  Surgeon: Dariel Doan Jr.,  MD;  Location: [facility] ENDO;  Service: Pain Management;  Laterality: N/A;  Cervical Epidural Steroid Injection     70698    Arrive @ 1130; ASA; Check BG    EPIDURAL STEROID INJECTION Bilateral 5/29/2019    Procedure: Lumbar Medial Branch Blocks;  Surgeon: Dariel Doan Jr., MD;  Location: [facility] ENDO;  Service: Pain Management;  Laterality: Bilateral;  Bilateral Lumbar Medial Branch Blocks L3, L4, L5    [id number]  [id number]    Attive @ 1030 (11 arrival request); NO Sedation; ASA; Check BG    EPIDURAL STEROID INJECTION Bilateral 7/3/2019    Procedure: Lumbar Medial Branch Blocks;  Surgeon: Dariel Doan Jr., MD;  Location: [facility] ENDO;  Service: Pain Management;  Laterality: Bilateral;  Bilateral Lumbar Medial Branch Blocks L3, L4, L5    [id number]  [id number]    Arrive @ 0930; NO Sedation; ASA; Check BG    EPIDURAL STEROID INJECTION N/A 8/7/2019    Procedure: Injection, Steroid, Epidural Cervical;  Surgeon: Dariel Doan Jr., MD;  Location: [facility] ENDO;  Service: Pain Management;  Laterality: N/A;  Cervical Epidural Steroid Injection C7-T1    [id number]    Arrive @ 1115; Last ASA 7/30; Check BG    ESOPHAGOGASTRODUODENOSCOPY N/A 7/28/2023    Procedure: EGD (ESOPHAGOGASTRODUODENOSCOPY);  Surgeon: Jaylene Alvarado MD;  Location: [facility] ENDO;  Service: Endoscopy;  Laterality: N/A;  inst via portal    LEFT HEART CATHETERIZATION Left 9/21/2021    Procedure: Left heart cath 9am start, R rad access;  Surgeon: Dariel Conner MD;  Location: [facility] CATH LAB;  Service: Cardiology;  Laterality: Left;  RN PRE OP Covid NEGATIVE ON  9-20-21.  C A    NO PAST SURGERIES       Family History   Problem Relation Age of Onset    No Known Problems Mother     No Known Problems Father     Hypertension Unknown     Diabetes Unknown     Diabetes Paternal Grandfather     Heart disease Paternal Grandfather     No Known Problems Sister     No Known Problems Brother     No Known Problems Maternal Aunt     No Known Problems Maternal Uncle     No Known Problems  Paternal Aunt     No Known Problems Paternal Uncle     No Known Problems Maternal Grandmother     No Known Problems Maternal Grandfather     No Known Problems Paternal Grandmother     Amblyopia Neg Hx     Blindness Neg Hx     Cancer Neg Hx     Cataracts Neg Hx     Glaucoma Neg Hx     Macular degeneration Neg Hx     Retinal detachment Neg Hx     Strabismus Neg Hx     Stroke Neg Hx     Thyroid disease Neg Hx      Social History     Socioeconomic History    Marital status:     Number of children: 8    Highest education level: 11th grade   Occupational History    Occupation:    Tobacco Use    Smoking status: Never    Smokeless tobacco: Never   Substance and Sexual Activity    Alcohol use: Yes     Alcohol/week: 0.0 standard drinks of alcohol     Comment: occacionally    Drug use: No    Sexual activity: Not Currently     Social Determinants of Health     Financial Resource Strain: Low Risk  (3/7/2023)    Overall Financial Resource Strain (CARDIA)     Difficulty of Paying Living Expenses: Not very hard   Food Insecurity: No Food Insecurity (3/7/2023)    Hunger Vital Sign     Worried About Running Out of Food in the Last Year: Never true     Ran Out of Food in the Last Year: Never true   Transportation Needs: No Transportation Needs (3/7/2023)    PRAPARE - Transportation     Lack of Transportation (Medical): No     Lack of Transportation (Non-Medical): No   Physical Activity: Inactive (3/7/2023)    Exercise Vital Sign     Days of Exercise per Week: 0 days     Minutes of Exercise per Session: 0 min   Stress: No Stress Concern Present (3/7/2023)    [employer] - Occupational Stress Questionnaire     Feeling of Stress : Not at all   Social Connections: Socially Isolated (3/7/2023)    Social Connection and Isolation Panel [NHANES]     Frequency of Communication with Friends and Family: More than three times a week     Frequency of Social Gatherings with Friends and Family: More  than three times a week     Attends [employer] Services: Never     Active Member of Clubs or Organizations: No     Attends Club or Organization Meetings: Never     Marital Status:    Housing Stability: Unknown (3/7/2023)    Housing Stability Vital Sign     Unable to Pay for Housing in the Last Year: No     Unstable Housing in the Last Year: No       Current Outpatient Medications:     acetaminophen (TYLENOL) 325 MG tablet, Take 2 tablets (650 mg total) by mouth every 6 (six) hours as needed for Pain or Temperature greater than (100.3)., Disp: 30 tablet, Rfl: 0    albuterol (PROVENTIL/VENTOLIN HFA) 90 mcg/actuation inhaler, Inhale 2 puffs into the lungs every 6 (six) hours as needed for Wheezing. Rescue, Disp: 18 g, Rfl: 5    ascorbic acid, vitamin C, (VITAMIN C) 500 MG tablet, Take 500 mg by mouth once daily., Disp: , Rfl:     aspirin (ECOTRIN) 81 MG EC tablet, Take 81 mg by mouth once daily., Disp: , Rfl:     blood pressure test kit-large Kit, 1 Device by Misc.(Non-Drug; Combo Route) route 2 (two) times daily., Disp: 1 each, Rfl: 0    blood sugar diagnostic Strp, To check BG 2 times daily, to use with insurance preferred meter, Disp: 200 strip, Rfl: 3    chlorhexidine (PERIDEX) 0.12 % solution, Use as directed 15 mLs in the mouth or throat 2 (two) times daily., Disp: , Rfl:     colchicine (COLCRYS) 0.6 mg tablet, Take 1 tablet (0.6 mg total) by mouth daily as needed., Disp: 90 tablet, Rfl: 2    colchicine (MITIGARE) 0.6 mg Cap, Take by mouth., Disp: , Rfl:     dicyclomine (BENTYL) 20 mg tablet, TAKE 1 TABLET(20 MG) BY MOUTH THREE TIMES DAILY AS NEEDED FOR ABDOMINAL PAIN, Disp: 60 tablet, Rfl: 0    diltiaZEM (CARDIZEM CD) 240 MG 24 hr capsule, Take 1 capsule (240 mg total) by mouth once daily., Disp: 90 capsule, Rfl: 2    diphenoxylate-atropine 2.5-0.025 mg (LOMOTIL) 2.5-0.025 mg per tablet, Take 1 tablet by mouth 4 (four) times daily as needed for Diarrhea., Disp: 60 tablet, Rfl: 3    fluticasone-salmeterol  "diskus inhaler 500-50 mcg, Inhale 1 puff into the lungs 2 (two) times daily. Controller, Disp: 60 each, Rfl: 11    hydroCHLOROthiazide (HYDRODIURIL) 25 MG tablet, Take 1 tablet (25 mg total) by mouth once daily., Disp: 90 tablet, Rfl: 3    lancets Misc, To check BG 2 times daily, to use with insurance preferred meter, Disp: 200 each, Rfl: 3    polyethylene glycol (GOLYTELY) 236-22.74-6.74 -5.86 gram suspension, Take 4,000 mLs by mouth once., Disp: , Rfl:     PREVIDENT 5000 SENSITIVE 1.1-5 % Pste, use as directed, Disp: , Rfl:     tiZANidine (ZANAFLEX) 4 MG tablet, Take 1 tablet (4 mg total) by mouth nightly as needed., Disp: 90 tablet, Rfl: 1    vitamin D (VITAMIN D3) 1000 units Tab, Take 1,000 Units by mouth once daily., Disp: , Rfl:     atorvastatin (LIPITOR) 40 MG tablet, Take 1 tablet (40 mg total) by mouth once daily., Disp: 90 tablet, Rfl: 1    blood-glucose meter kit, To check BG 2 times daily, to use with insurance preferred meter, Disp: 1 each, Rfl: 0    gabapentin (NEURONTIN) 600 MG tablet, Take 1 tablet (600 mg total) by mouth 3 (three) times daily as needed., Disp: 90 tablet, Rfl: 1    PFIZER COVID BIVAL,12Y UP,,PF, 30 mcg/0.3 mL injection, , Disp: , Rfl:    Objective:      Vitals:    09/06/23 0906   BP: 114/70   BP Location: Left arm   Patient Position: Sitting   BP Method: Small (Manual)   Pulse: 89   Resp: 18   Temp: 97.9 °F (36.6 °C)   TempSrc: Oral   SpO2: 98%   Weight: 113.5 kg (250 lb 3.6 oz)   Height: 5' 9" (1.753 m)       Physical Exam  Constitutional:       General: He is not in acute distress.     Appearance: He is not diaphoretic.   HENT:      Head: Normocephalic and atraumatic.   Eyes:      Conjunctiva/sclera: Conjunctivae normal.   Pulmonary:      Effort: Pulmonary effort is normal.   Abdominal:      General: There is no distension.      Tenderness: There is no abdominal tenderness.   Musculoskeletal:         General: Normal range of motion.      Cervical back: Neck supple.   Skin:     " Findings: No rash.   Neurological:      Mental Status: He is alert and oriented to person, place, and time.   Psychiatric:         Behavior: Behavior normal.         Thought Content: Thought content normal.         Judgment: Judgment normal.            Assessment:       1. Lower abdominal pain    2. Needs flu shot    3. DDD (degenerative disc disease), lumbar    4. Hyperlipidemia, acquired    5. Osteoarthritis of spine with radiculopathy, cervical region    6. Ulnar neuropathy of both upper extremities        Plan:       Lower abdominal pain    Needs flu shot  -     Influenza - Quadrivalent (Adjuvanted)    DDD (degenerative disc disease), lumbar    Hyperlipidemia, acquired  -     atorvastatin (LIPITOR) 40 MG tablet; Take 1 tablet (40 mg total) by mouth once daily.  Dispense: 90 tablet; Refill: 1    Osteoarthritis of spine with radiculopathy, cervical region  -     gabapentin (NEURONTIN) 600 MG tablet; Take 1 tablet (600 mg total) by mouth 3 (three) times daily as needed.  Dispense: 90 tablet; Refill: 1    Ulnar neuropathy of both upper extremities  -     gabapentin (NEURONTIN) 600 MG tablet; Take 1 tablet (600 mg total) by mouth 3 (three) times daily as needed.  Dispense: 90 tablet; Refill: 1    CT showed nonobstructive gas pattern.  Advised patient has a fiber supplements.  Colonoscopy did show diverticulosis.  Patient needs refills on her medications.  Place refills.          Future Appointments   Date Time Provider Department Center   9/21/2023  3:00 PM Jose Antonio [facility] [place]       Patient note was created using [employer].  Any errors in syntax or even information may not have been identified and edited on initial review prior to signing this note.

## 2023-09-06 NOTE — PROGRESS NOTES
Health Maintenance Due   Topic     Shingles Vaccine (1 of 2)  hx chicken pox. Notified pt can get vaccine at pharmacy    Influenza Vaccine (1) Pt agree to get today       Flu vaccine given. Left deltoid. Patient tolerated well.

## 2023-09-08 ENCOUNTER — LAB VISIT (OUTPATIENT)
Dept: LAB | Facility: HOSPITAL | Age: 73
End: 2023-09-08
Attending: FAMILY MEDICINE
Payer: MEDICARE

## 2023-09-08 DIAGNOSIS — R73.03 PREDIABETES: ICD-10-CM

## 2023-09-08 LAB
ALBUMIN/CREAT UR: 2.4 UG/MG (ref 0–30)
CREAT UR-MCNC: 297 MG/DL (ref 23–375)
MICROALBUMIN UR DL<=1MG/L-MCNC: 7 UG/ML

## 2023-09-08 PROCEDURE — 82043 UR ALBUMIN QUANTITATIVE: CPT | Performed by: FAMILY MEDICINE

## 2023-10-05 ENCOUNTER — PATIENT OUTREACH (OUTPATIENT)
Dept: EMERGENCY MEDICINE | Facility: HOSPITAL | Age: 73
End: 2023-10-05
Payer: MEDICARE

## 2023-10-23 DIAGNOSIS — M47.22 OSTEOARTHRITIS OF SPINE WITH RADICULOPATHY, CERVICAL REGION: ICD-10-CM

## 2023-10-23 DIAGNOSIS — G56.23 ULNAR NEUROPATHY OF BOTH UPPER EXTREMITIES: ICD-10-CM

## 2023-10-23 NOTE — TELEPHONE ENCOUNTER
Care Due:                  Date            Visit Type   Department     Provider  --------------------------------------------------------------------------------                                EP -                              PRIMARY      [facility]  Last Visit: 09-      CARE ([facility])   MEDICINE       Jose Antonio Foster  Next Visit: None Scheduled  None         None Found                                                            Last  Test          Frequency    Reason                     Performed    Due Date  --------------------------------------------------------------------------------    Uric Acid...  12 months..  colchicine...............  Not Found    Overdue    [employer] Embedded Care Due Messages. Reference number: [id number].   10/23/2023 6:22:19 PM CDT

## 2023-10-25 RX ORDER — GABAPENTIN 600 MG/1
TABLET ORAL
Qty: 90 TABLET | Refills: 1 | Status: ON HOLD | OUTPATIENT
Start: 2023-10-25 | End: 2023-12-28 | Stop reason: HOSPADM

## 2023-11-02 ENCOUNTER — OFFICE VISIT (OUTPATIENT)
Dept: FAMILY MEDICINE | Facility: CLINIC | Age: 73
End: 2023-11-02
Payer: MEDICARE

## 2023-11-02 ENCOUNTER — LAB VISIT (OUTPATIENT)
Dept: LAB | Facility: HOSPITAL | Age: 73
End: 2023-11-02
Attending: PHYSICIAN ASSISTANT
Payer: MEDICARE

## 2023-11-02 VITALS
SYSTOLIC BLOOD PRESSURE: 122 MMHG | HEART RATE: 84 BPM | HEIGHT: 69 IN | RESPIRATION RATE: 18 BRPM | DIASTOLIC BLOOD PRESSURE: 58 MMHG | WEIGHT: 272.25 LBS | OXYGEN SATURATION: 99 % | TEMPERATURE: 98 F | BODY MASS INDEX: 40.32 KG/M2

## 2023-11-02 DIAGNOSIS — R60.0 BILATERAL LOWER EXTREMITY EDEMA: ICD-10-CM

## 2023-11-02 DIAGNOSIS — S30.810A ABRASION OF BUTTOCK, INITIAL ENCOUNTER: ICD-10-CM

## 2023-11-02 DIAGNOSIS — R60.0 BILATERAL LOWER EXTREMITY EDEMA: Primary | ICD-10-CM

## 2023-11-02 LAB
ALBUMIN SERPL BCP-MCNC: 2.8 G/DL (ref 3.5–5.2)
ALP SERPL-CCNC: 74 U/L (ref 55–135)
ALT SERPL W/O P-5'-P-CCNC: 8 U/L (ref 10–44)
ANION GAP SERPL CALC-SCNC: 16 MMOL/L (ref 8–16)
AST SERPL-CCNC: 18 U/L (ref 10–40)
BILIRUB SERPL-MCNC: 0.8 MG/DL (ref 0.1–1)
BNP SERPL-MCNC: 23 PG/ML (ref 0–99)
BUN SERPL-MCNC: 17 MG/DL (ref 8–23)
CALCIUM SERPL-MCNC: 9.1 MG/DL (ref 8.7–10.5)
CHLORIDE SERPL-SCNC: 96 MMOL/L (ref 95–110)
CO2 SERPL-SCNC: 28 MMOL/L (ref 23–29)
CREAT SERPL-MCNC: 1.6 MG/DL (ref 0.5–1.4)
EST. GFR  (NO RACE VARIABLE): 45.8 ML/MIN/1.73 M^2
GLUCOSE SERPL-MCNC: 92 MG/DL (ref 70–110)
POTASSIUM SERPL-SCNC: 3.6 MMOL/L (ref 3.5–5.1)
PROT SERPL-MCNC: 6.8 G/DL (ref 6–8.4)
SODIUM SERPL-SCNC: 140 MMOL/L (ref 136–145)

## 2023-11-02 PROCEDURE — 3074F PR MOST RECENT SYSTOLIC BLOOD PRESSURE < 130 MM HG: ICD-10-PCS | Mod: CPTII,S$GLB,, | Performed by: PHYSICIAN ASSISTANT

## 2023-11-02 PROCEDURE — 1101F PT FALLS ASSESS-DOCD LE1/YR: CPT | Mod: CPTII,S$GLB,, | Performed by: PHYSICIAN ASSISTANT

## 2023-11-02 PROCEDURE — 3288F PR FALLS RISK ASSESSMENT DOCUMENTED: ICD-10-PCS | Mod: CPTII,S$GLB,, | Performed by: PHYSICIAN ASSISTANT

## 2023-11-02 PROCEDURE — 1159F PR MEDICATION LIST DOCUMENTED IN MEDICAL RECORD: ICD-10-PCS | Mod: CPTII,S$GLB,, | Performed by: PHYSICIAN ASSISTANT

## 2023-11-02 PROCEDURE — 3008F PR BODY MASS INDEX (BMI) DOCUMENTED: ICD-10-PCS | Mod: CPTII,S$GLB,, | Performed by: PHYSICIAN ASSISTANT

## 2023-11-02 PROCEDURE — 1101F PR PT FALLS ASSESS DOC 0-1 FALLS W/OUT INJ PAST YR: ICD-10-PCS | Mod: CPTII,S$GLB,, | Performed by: PHYSICIAN ASSISTANT

## 2023-11-02 PROCEDURE — 1160F RVW MEDS BY RX/DR IN RCRD: CPT | Mod: CPTII,S$GLB,, | Performed by: PHYSICIAN ASSISTANT

## 2023-11-02 PROCEDURE — 3066F NEPHROPATHY DOC TX: CPT | Mod: CPTII,S$GLB,, | Performed by: PHYSICIAN ASSISTANT

## 2023-11-02 PROCEDURE — 99999 PR PBB SHADOW E&M-EST. PATIENT-LVL V: CPT | Mod: PBBFAC,,, | Performed by: PHYSICIAN ASSISTANT

## 2023-11-02 PROCEDURE — 3066F PR DOCUMENTATION OF TREATMENT FOR NEPHROPATHY: ICD-10-PCS | Mod: CPTII,S$GLB,, | Performed by: PHYSICIAN ASSISTANT

## 2023-11-02 PROCEDURE — 1125F PR PAIN SEVERITY QUANTIFIED, PAIN PRESENT: ICD-10-PCS | Mod: CPTII,S$GLB,, | Performed by: PHYSICIAN ASSISTANT

## 2023-11-02 PROCEDURE — 1125F AMNT PAIN NOTED PAIN PRSNT: CPT | Mod: CPTII,S$GLB,, | Performed by: PHYSICIAN ASSISTANT

## 2023-11-02 PROCEDURE — 99214 PR OFFICE/OUTPT VISIT, EST, LEVL IV, 30-39 MIN: ICD-10-PCS | Mod: S$GLB,,, | Performed by: PHYSICIAN ASSISTANT

## 2023-11-02 PROCEDURE — 3044F HG A1C LEVEL LT 7.0%: CPT | Mod: CPTII,S$GLB,, | Performed by: PHYSICIAN ASSISTANT

## 2023-11-02 PROCEDURE — 3078F PR MOST RECENT DIASTOLIC BLOOD PRESSURE < 80 MM HG: ICD-10-PCS | Mod: CPTII,S$GLB,, | Performed by: PHYSICIAN ASSISTANT

## 2023-11-02 PROCEDURE — 3078F DIAST BP <80 MM HG: CPT | Mod: CPTII,S$GLB,, | Performed by: PHYSICIAN ASSISTANT

## 2023-11-02 PROCEDURE — 3061F NEG MICROALBUMINURIA REV: CPT | Mod: CPTII,S$GLB,, | Performed by: PHYSICIAN ASSISTANT

## 2023-11-02 PROCEDURE — 99214 OFFICE O/P EST MOD 30 MIN: CPT | Mod: S$GLB,,, | Performed by: PHYSICIAN ASSISTANT

## 2023-11-02 PROCEDURE — 3288F FALL RISK ASSESSMENT DOCD: CPT | Mod: CPTII,S$GLB,, | Performed by: PHYSICIAN ASSISTANT

## 2023-11-02 PROCEDURE — 99999 PR PBB SHADOW E&M-EST. PATIENT-LVL V: ICD-10-PCS | Mod: PBBFAC,,, | Performed by: PHYSICIAN ASSISTANT

## 2023-11-02 PROCEDURE — 1160F PR REVIEW ALL MEDS BY PRESCRIBER/CLIN PHARMACIST DOCUMENTED: ICD-10-PCS | Mod: CPTII,S$GLB,, | Performed by: PHYSICIAN ASSISTANT

## 2023-11-02 PROCEDURE — 3008F BODY MASS INDEX DOCD: CPT | Mod: CPTII,S$GLB,, | Performed by: PHYSICIAN ASSISTANT

## 2023-11-02 PROCEDURE — 80053 COMPREHEN METABOLIC PANEL: CPT | Performed by: PHYSICIAN ASSISTANT

## 2023-11-02 PROCEDURE — 3044F PR MOST RECENT HEMOGLOBIN A1C LEVEL <7.0%: ICD-10-PCS | Mod: CPTII,S$GLB,, | Performed by: PHYSICIAN ASSISTANT

## 2023-11-02 PROCEDURE — 83880 ASSAY OF NATRIURETIC PEPTIDE: CPT | Performed by: PHYSICIAN ASSISTANT

## 2023-11-02 PROCEDURE — 36415 COLL VENOUS BLD VENIPUNCTURE: CPT | Mod: PO | Performed by: PHYSICIAN ASSISTANT

## 2023-11-02 PROCEDURE — 1159F MED LIST DOCD IN RCRD: CPT | Mod: CPTII,S$GLB,, | Performed by: PHYSICIAN ASSISTANT

## 2023-11-02 PROCEDURE — 3074F SYST BP LT 130 MM HG: CPT | Mod: CPTII,S$GLB,, | Performed by: PHYSICIAN ASSISTANT

## 2023-11-02 PROCEDURE — 3061F PR NEG MICROALBUMINURIA RESULT DOCUMENTED/REVIEW: ICD-10-PCS | Mod: CPTII,S$GLB,, | Performed by: PHYSICIAN ASSISTANT

## 2023-11-02 RX ORDER — TAMSULOSIN HYDROCHLORIDE 0.4 MG/1
0.4 CAPSULE ORAL DAILY
Status: ON HOLD | COMMUNITY
Start: 2023-09-23 | End: 2024-03-25 | Stop reason: HOSPADM

## 2023-11-02 NOTE — PROGRESS NOTES
Health Maintenance Due   Topic     Shingles Vaccine (1 of 2) Hx of chickenpox. Notified pt can get vaccine at pharmacy.    RSV Vaccine (Age 60+) (1 - 1-dose 60+ series) Not offered at this Facility    COVID-19 Vaccine (7 - 2023-24 season) Not offered at this Facility

## 2023-11-03 RX ORDER — FUROSEMIDE 20 MG/1
20 TABLET ORAL 2 TIMES DAILY
Qty: 60 TABLET | Refills: 0 | Status: SHIPPED | OUTPATIENT
Start: 2023-11-03 | End: 2023-11-29 | Stop reason: SDUPTHER

## 2023-11-03 NOTE — PROGRESS NOTES
Subjective     Patient ID: Bj Pate Jr. is a 73 y.o. male.    Chief Complaint: Leg Swelling, Foot Swelling, and Lesion (back)    HPI: 72 yo male presents for bilateral foot/leg swelling. Chronic issue worsening over the past couple months. He states he was given lasix in the past, has not currently taken it. Does not have compression socks. He has chronic SOB, no worsening. He denies adding salt to food but states he eats fast food. Denies pain.  He is also concerned about the skin of his buttock. States it feels raw and slightly irritated. States when he was in SNF years ago, they were applying cream to the area.     Review of Systems   Respiratory:  Positive for shortness of breath (chronic).    Cardiovascular:  Positive for leg swelling.   Integumentary:  Positive for rash.          Objective     Physical Exam  Constitutional:       Appearance: Normal appearance.   HENT:      Head: Normocephalic and atraumatic.   Cardiovascular:      Rate and Rhythm: Normal rate and regular rhythm.   Pulmonary:      Effort: Pulmonary effort is normal.      Breath sounds: Normal breath sounds.   Musculoskeletal:      Right lower le+ Edema present.      Left lower le+ Edema present.   Skin:         Neurological:      Mental Status: He is alert.   Psychiatric:         Mood and Affect: Mood normal.         Thought Content: Thought content normal.            Assessment and Plan     1. Bilateral lower extremity edema  -     B-TYPE NATRIURETIC PEPTIDE; Future; Expected date: 2023  -     Comprehensive Metabolic Panel; Future; Expected date: 2023  -     furosemide (LASIX) 20 MG tablet; Take 1 tablet (20 mg total) by mouth 2 (two) times daily.  Dispense: 60 tablet; Refill: 0  -     hold hctz, start lasix. F/u in 1 month. Advised elevation and low sodium diet    2. Abrasion of buttock, initial encounter  Advised a&d or rosalia butt paste as barrier ointment          No follow-ups on file.

## 2023-11-15 ENCOUNTER — HOSPITAL ENCOUNTER (EMERGENCY)
Facility: HOSPITAL | Age: 73
Discharge: HOME | End: 2023-11-15
Attending: INTERNAL MEDICINE
Payer: MEDICARE

## 2023-11-15 VITALS
HEIGHT: 69 IN | RESPIRATION RATE: 18 BRPM | DIASTOLIC BLOOD PRESSURE: 70 MMHG | HEART RATE: 100 BPM | SYSTOLIC BLOOD PRESSURE: 128 MMHG | BODY MASS INDEX: 46.65 KG/M2 | OXYGEN SATURATION: 97 % | TEMPERATURE: 99 F | WEIGHT: 315 LBS

## 2023-11-15 DIAGNOSIS — M43.6 TORTICOLLIS: Primary | ICD-10-CM

## 2023-11-15 DIAGNOSIS — M25.552 LEFT HIP PAIN: ICD-10-CM

## 2023-11-15 PROCEDURE — 96375 TX/PRO/DX INJ NEW DRUG ADDON: CPT

## 2023-11-15 PROCEDURE — 99284 EMERGENCY DEPT VISIT MOD MDM: CPT | Mod: 25

## 2023-11-15 PROCEDURE — 25000003 PHARM REV CODE 250: Performed by: INTERNAL MEDICINE

## 2023-11-15 PROCEDURE — 96374 THER/PROPH/DIAG INJ IV PUSH: CPT

## 2023-11-15 PROCEDURE — 63600175 PHARM REV CODE 636 W HCPCS: Performed by: INTERNAL MEDICINE

## 2023-11-15 RX ORDER — ONDANSETRON 4 MG/1
4 TABLET, ORALLY DISINTEGRATING ORAL EVERY 8 HOURS PRN
Qty: 10 TABLET | Refills: 0 | Status: SHIPPED | OUTPATIENT
Start: 2023-11-15 | End: 2023-12-11

## 2023-11-15 RX ORDER — METHOCARBAMOL 500 MG/1
1500 TABLET, FILM COATED ORAL
Status: COMPLETED | OUTPATIENT
Start: 2023-11-15 | End: 2023-11-15

## 2023-11-15 RX ORDER — ONDANSETRON 2 MG/ML
4 INJECTION INTRAMUSCULAR; INTRAVENOUS
Status: COMPLETED | OUTPATIENT
Start: 2023-11-15 | End: 2023-11-15

## 2023-11-15 RX ORDER — METHOCARBAMOL 750 MG/1
1500 TABLET, FILM COATED ORAL 3 TIMES DAILY
Qty: 30 TABLET | Refills: 0 | Status: SHIPPED | OUTPATIENT
Start: 2023-11-15 | End: 2023-11-20

## 2023-11-15 RX ORDER — HYDROCODONE BITARTRATE AND ACETAMINOPHEN 5; 325 MG/1; MG/1
1 TABLET ORAL EVERY 6 HOURS PRN
Qty: 10 TABLET | Refills: 0 | Status: ON HOLD | OUTPATIENT
Start: 2023-11-15 | End: 2023-12-28 | Stop reason: HOSPADM

## 2023-11-15 RX ORDER — MORPHINE SULFATE 4 MG/ML
3 INJECTION, SOLUTION INTRAMUSCULAR; INTRAVENOUS
Status: COMPLETED | OUTPATIENT
Start: 2023-11-15 | End: 2023-11-15

## 2023-11-15 RX ADMIN — METHOCARBAMOL 1500 MG: 500 TABLET ORAL at 07:11

## 2023-11-15 RX ADMIN — MORPHINE SULFATE 3 MG: 4 INJECTION, SOLUTION INTRAMUSCULAR; INTRAVENOUS at 07:11

## 2023-11-15 RX ADMIN — ONDANSETRON 4 MG: 2 INJECTION INTRAMUSCULAR; INTRAVENOUS at 07:11

## 2023-11-16 ENCOUNTER — PATIENT OUTREACH (OUTPATIENT)
Dept: EMERGENCY MEDICINE | Facility: HOSPITAL | Age: 73
End: 2023-11-16

## 2023-11-16 NOTE — ED TRIAGE NOTES
Pt presents to ED via EMS with complaint or left hip pain that radiates down pelvis and kinked neck since Monday. Pt states he denies trauma to hip. Pt denies chest pain or sob. Pt denies n/v/d. Pt alert and oriented x4.

## 2023-11-16 NOTE — ED PROVIDER NOTES
"Encounter Date: 11/15/2023    SCRIBE #1 NOTE: I, Margarita Donald, am scribing for, and in the presence of,  Jeanmarie Brooks MD. I have scribed the following portions of the note - Other sections scribed: HPI,ROS,PE.       History     Chief Complaint   Patient presents with    Hip Pain     Pt BIB EMS from home for nontraumatic left hip pain x2 days. Pt denied falls.     Bj Pate Jr. is a 73 y.o. male, with a PMHx of CKD, COPD, HLD, stroke and DM type 2, who presents to the ED with left hip pain and neck pain onset 2 days ago. Patient reports that he feels left sided hip pain that worsens when he sits up. Patient states that he feels like "I have a crick in my neck" that is located on both sided below his skull. Patient endorses using a heating pad with no alleviation. No other exacerbating or alleviating factors.     The history is provided by the patient. No  was used.     Review of patient's allergies indicates:   Allergen Reactions    Tomato (solanum lycopersicum) Hives    Naproxen Hives    Shrimp Other (See Comments)     Past Medical History:   Diagnosis Date    Anemia 06/04/2021    Anticoagulant long-term use     Basal ganglia hemorrhage     Left basal ganglia hemorrhage with resultant right-sided hemiparesis which has resolved.     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      Past Surgical History:   Procedure Laterality Date    CARPAL TUNNEL RELEASE Left " 2/19/2020    Procedure: RELEASE, CARPAL TUNNEL LEFT;  Surgeon: Maria Luisa Mccurdy MD;  Location: [facility] OR;  Service: Orthopedics;  Laterality: Left;    COLONOSCOPY N/A 7/28/2023    Procedure: COLONOSCOPY;  Surgeon: Jaylene Alvarado MD;  Location: [facility] ENDO;  Service: Endoscopy;  Laterality: N/A;    EPIDURAL STEROID INJECTION N/A 5/2/2019    Procedure: Injection, Steroid, Epidural Cervical;  Surgeon: Dariel Doan Jr., MD;  Location: [facility] ENDO;  Service: Pain Management;  Laterality: N/A;  Cervical Epidural Steroid Injection     94327    Arrive @ 1130; ASA; Check BG    EPIDURAL STEROID INJECTION Bilateral 5/29/2019    Procedure: Lumbar Medial Branch Blocks;  Surgeon: Dariel Doan Jr., MD;  Location: [facility];  Service: Pain Management;  Laterality: Bilateral;  Bilateral Lumbar Medial Branch Blocks L3, L4, L5    00622  38138    Attive @ 1030 (11 arrival request); NO Sedation; ASA; Check BG    EPIDURAL STEROID INJECTION Bilateral 7/3/2019    Procedure: Lumbar Medial Branch Blocks;  Surgeon: Dariel Doan Jr., MD;  Location: [facility];  Service: Pain Management;  Laterality: Bilateral;  Bilateral Lumbar Medial Branch Blocks L3, L4, L5    14189  40193    Arrive @ 0930; NO Sedation; ASA; Check BG    EPIDURAL STEROID INJECTION N/A 8/7/2019    Procedure: Injection, Steroid, Epidural Cervical;  Surgeon: Dariel Doan Jr., MD;  Location: [facility];  Service: Pain Management;  Laterality: N/A;  Cervical Epidural Steroid Injection C7-T1    15953    Arrive @ 1115; Last ASA 7/30; Check BG    ESOPHAGOGASTRODUODENOSCOPY N/A 7/28/2023    Procedure: EGD (ESOPHAGOGASTRODUODENOSCOPY);  Surgeon: Jaylene Alvarado MD;  Location: [facility];  Service: Endoscopy;  Laterality: N/A;  inst via portal    LEFT HEART CATHETERIZATION Left 9/21/2021    Procedure: Left heart cath 9am start, R rad access;  Surgeon: Dariel Conner MD;  Location: [facility] CATH LAB;  Service: Cardiology;  Laterality: Left;  RN PRE OP Covid  NEGATIVE ON  9-20-21.  C A    NO PAST SURGERIES       Family History   Problem Relation Age of Onset    No Known Problems Mother     No Known Problems Father     Hypertension Unknown     Diabetes Unknown     Diabetes Paternal Grandfather     Heart disease Paternal Grandfather     No Known Problems Sister     No Known Problems Brother     No Known Problems Maternal Aunt     No Known Problems Maternal Uncle     No Known Problems Paternal Aunt     No Known Problems Paternal Uncle     No Known Problems Maternal Grandmother     No Known Problems Maternal Grandfather     No Known Problems Paternal Grandmother     Amblyopia Neg Hx     Blindness Neg Hx     Cancer Neg Hx     Cataracts Neg Hx     Glaucoma Neg Hx     Macular degeneration Neg Hx     Retinal detachment Neg Hx     Strabismus Neg Hx     Stroke Neg Hx     Thyroid disease Neg Hx      Social History     Tobacco Use    Smoking status: Never    Smokeless tobacco: Never   Substance Use Topics    Alcohol use: Yes     Alcohol/week: 0.0 standard drinks of alcohol     Comment: occacionally    Drug use: No     Review of Systems   Constitutional:  Negative for fever.   HENT:  Negative for sore throat.    Respiratory:  Negative for shortness of breath.    Cardiovascular:  Negative for chest pain.   Gastrointestinal:  Negative for diarrhea, nausea and vomiting.   Genitourinary:  Negative for dysuria.   Musculoskeletal:  Positive for myalgias (left hip) and neck pain. Negative for back pain.   Skin:  Negative for rash.   Neurological:  Negative for weakness and headaches.   Psychiatric/Behavioral:  Negative for behavioral problems.    All other systems reviewed and are negative.      Physical Exam     Initial Vitals [11/15/23 1820]   BP Pulse Resp Temp SpO2   (!) 109/41 104 (!) 22 98.5 °F (36.9 °C) 96 %      MAP       --         Physical Exam    Nursing note and vitals reviewed.  Constitutional: He appears well-developed and well-nourished. He is Obese .   HENT:   Head:  Normocephalic and atraumatic.   Eyes: Conjunctivae are normal.   Neck: Neck supple.   Normal range of motion.  Cardiovascular:  Normal rate, regular rhythm and normal heart sounds.     Exam reveals no gallop and no friction rub.       No murmur heard.  Pulmonary/Chest: Breath sounds normal. No respiratory distress. He has no wheezes. He has no rhonchi. He has no rales.   Abdominal: Abdomen is soft. There is no abdominal tenderness.   Musculoskeletal:         General: Normal range of motion.      Cervical back: Normal range of motion and neck supple. Tenderness (upon palpation) present.      Left hip: Tenderness (upon flexion) present.      Right lower leg: Pitting Edema present.      Left lower leg: Pitting Edema present.      Comments: Bilateral cervical paraspinal muscular pain upon rotation without midline tenderness. No hip pain upon palpation.      Neurological: He is alert and oriented to person, place, and time. GCS score is 15. GCS eye subscore is 4. GCS verbal subscore is 5. GCS motor subscore is 6.   Skin: Skin is warm and dry.   Psychiatric: He has a normal mood and affect.         ED Course   Procedures  Labs Reviewed - No data to display       Imaging Results              X-Ray Hips Bilateral 2 View Incl AP Pelvis (Final result)  Result time 11/15/23 21:38:43      Final result by Cleve Mustafa DO (11/15/23 21:38:43)                   Impression:      No acute fracture or dislocation.      Electronically signed by: Cleve Mustafa  Date:    11/15/2023  Time:    21:38               Narrative:    EXAMINATION:  XR HIPS BILATERAL 2 VIEW INCL AP PELVIS    CLINICAL HISTORY:  Pain in left hip    TECHNIQUE:  AP view of the pelvis and frogleg lateral views of both hips were performed.    COMPARISON:  None.    FINDINGS:  There is osteopenia.  There is no acute fracture or dislocation.  Alignment is normal.  There are degenerative changes.                                       Medications   methocarbamoL tablet  "1,500 mg (1,500 mg Oral Given 11/15/23 1936)   ondansetron injection 4 mg (4 mg Intravenous Given 11/15/23 1934)   morphine injection 3 mg (3 mg Intravenous Given 11/15/23 1933)     Medical Decision Making  Bj Pate Jr. is a 73 y.o. male, with a PMHx of CKD, COPD, HLD, stroke and DM type 2, who presents to the ED with left hip pain and neck pain onset 2 days ago. Patient reports that he feels left sided hip pain that worsens when he sits up. Patient states that he feels like "I have a crick in my neck" that is located on both sided below his skull. Patient endorses using a heating pad with no alleviation. No other exacerbating or alleviating factors.  Course of ED stay:  X-ray of pelvis reveals no acute abnormalities.  Patient was given instructions for torticollis and hip pain.  He received morphine/Zofran/Robaxin in the emergency department as well as prescriptions for Norco/Robaxin/Zofran.  He was advised to follow-up with his primary care physician within the next week for re-evaluation/return to the emergency department if condition worsens.    Amount and/or Complexity of Data Reviewed  Radiology: ordered.    Risk  Prescription drug management.            Scribe Attestation:   Scribe #1: I performed the above scribed service and the documentation accurately describes the services I performed. I attest to the accuracy of the note.              This document was produced by a scribe under my direction and in my presence. I agree with the content of the note and have made any necessary edits.     Dr. Brooks    11/16/2023 2:53 AM             Clinical Impression:   Final diagnoses:  [M25.552] Left hip pain  [M43.6] Torticollis (Primary)        ED Disposition Condition    Discharge Stable          ED Prescriptions       Medication Sig Dispense Start Date End Date Auth. Provider    methocarbamoL (ROBAXIN) 750 MG Tab Take 2 tablets (1,500 mg total) by mouth 3 (three) times daily. for 5 days 30 tablet 11/15/2023 " 11/20/2023 Jeanmarie Brooks MD    HYDROcodone-acetaminophen (NORCO) 5-325 mg per tablet Take 1 tablet by mouth every 6 (six) hours as needed for Pain. 10 tablet 11/15/2023 -- Jeanmarie Brooks MD    ondansetron (ZOFRAN-ODT) 4 MG TbDL Take 1 tablet (4 mg total) by mouth every 8 (eight) hours as needed (Nausea). 10 tablet 11/15/2023 -- Jeanmarie Brooks MD          Follow-up Information       Follow up With Specialties Details Why Contact Info    Jose Antonio Foster MD Family Medicine Schedule an appointment as soon as possible for a visit in 2 days For reevaluation [facility] [place] [address]  [phone]               Jeanmarie Brooks MD  11/16/23 0254

## 2023-11-27 ENCOUNTER — TELEPHONE (OUTPATIENT)
Dept: FAMILY MEDICINE | Facility: CLINIC | Age: 73
End: 2023-11-27
Payer: MEDICARE

## 2023-11-27 DIAGNOSIS — R26.81 GAIT INSTABILITY: ICD-10-CM

## 2023-11-27 DIAGNOSIS — R06.02 SOBOE (SHORTNESS OF BREATH ON EXERTION): ICD-10-CM

## 2023-11-27 DIAGNOSIS — M51.36 DDD (DEGENERATIVE DISC DISEASE), LUMBAR: ICD-10-CM

## 2023-11-27 DIAGNOSIS — Z86.73 HISTORY OF CVA (CEREBROVASCULAR ACCIDENT): Primary | ICD-10-CM

## 2023-11-27 NOTE — TELEPHONE ENCOUNTER
----- Message from Lucy Danielle sent at 11/27/2023 10:33 AM CST -----  Type:  Needs Medical Advice/Symptom-based Call    Who Called:pt     Symptoms (please be specific): asking for flexeril refill not in chart. For lower back pain. Call pt     How long has patient had these symptoms:  chronic    Would the patient rather a call back or a response via [facility]? call    Best Call Back Number: [phone] (home)       Additional Information:

## 2023-11-27 NOTE — TELEPHONE ENCOUNTER
Pt daughter requests home health. Pt has had multiple falls in the past couple weeks and is unable to take care of himself.

## 2023-11-27 NOTE — TELEPHONE ENCOUNTER
----- Message from Princess Pinon sent at 11/27/2023 12:54 PM CST -----  Regarding: pt called  Type: Patient Call Back         Who called: Ashlie (daughter)          What is the request in detail: Calling in regards to Home Health.          Can the clinic reply by [facility]? No         Would the patient rather a call back or a response via [facility]? Call back         Best call back number          [phone]                      Additional Information:         Thank you.

## 2023-11-29 DIAGNOSIS — R60.0 BILATERAL LOWER EXTREMITY EDEMA: ICD-10-CM

## 2023-11-29 RX ORDER — FUROSEMIDE 20 MG/1
20 TABLET ORAL 2 TIMES DAILY
Qty: 60 TABLET | Refills: 0 | Status: ON HOLD | OUTPATIENT
Start: 2023-11-29 | End: 2023-12-28

## 2023-11-29 NOTE — TELEPHONE ENCOUNTER
No care due was identified.  Health [facility] Embedded Care Due Messages. Reference number: [id number].   11/29/2023 10:37:42 AM CST

## 2023-12-01 NOTE — TELEPHONE ENCOUNTER
----- Message from Princess Pinon sent at 12/1/2023 12:13 PM CST -----  Regarding: pt called  Regarding: pt called  Type: Patient Call Back           Who called: Kevan  (daughter)            What is the request in detail: Calling in regards to Home Health.            Can the clinic reply by [facility]? No           Would the patient rather a call back or a response via [facility]? Call back           Best call back number          [phone]

## 2023-12-01 NOTE — TELEPHONE ENCOUNTER
Advised patient's daughter that previous message was sent to provider and we are awaiting a response and will be contacted with their recommendations.

## 2023-12-04 ENCOUNTER — TELEPHONE (OUTPATIENT)
Dept: FAMILY MEDICINE | Facility: CLINIC | Age: 73
End: 2023-12-04
Payer: MEDICARE

## 2023-12-05 ENCOUNTER — OUTPATIENT CASE MANAGEMENT (OUTPATIENT)
Dept: ADMINISTRATIVE | Facility: OTHER | Age: 73
End: 2023-12-05
Payer: MEDICARE

## 2023-12-06 ENCOUNTER — OUTPATIENT CASE MANAGEMENT (OUTPATIENT)
Dept: ADMINISTRATIVE | Facility: OTHER | Age: 73
End: 2023-12-06
Payer: MEDICARE

## 2023-12-07 ENCOUNTER — OUTPATIENT CASE MANAGEMENT (OUTPATIENT)
Dept: ADMINISTRATIVE | Facility: OTHER | Age: 73
End: 2023-12-07
Payer: MEDICARE

## 2023-12-07 NOTE — PROGRESS NOTES
Outpatient Care Management  Initial Patient Assessment    Patient: Bj Pate Jr.  MRN: [id number]  Date of Service: 12/07/2023  Completed by: Sharla Sheriff RN  Referral Date:   Date of Eligibility: 12/5/2023  Program: High Risk  Status: Ongoing  Effective Dates: 12/7/2023 - present  Responsible Staff: Sharla Sheriff RN        Reason for Visit   Patient presents with    OPCM RN Second Assessment Attempt    Initial Assessment    Plan Of Care    OPCM Enrollment Call    OPCM Chart Review       Brief Summary:  Bj Pate Jr. was referred by self for Generalized weakness and pain. Patient qualifies for program based on risk score of 90.2%.   Active problem list, medical, surgical and social history reviewed. Active comorbidities include Hx CVA, DDD, HTN, CKD stage 3, HILARIO, and Pulmonary HTN. Areas of need identified by patient include CKD stage 3. Pt voiced that he has some swelling to his ankles. On med rec pt voiced that he takes the furosemide once a day. Pt read the bottle and read that he is to take the medication twice a day as per med list. Pt voiced that he has not seen his PCP since he had the onset of generalized weakness. Pt voiced that he is a new pt to the PCP. Pt voiced that he had a recent change in the frequency of his BM. Pt voiced that he would have a BM daily and now his bowel moves once a week. Pt voiced that with the weakness he requires assistance with his ADLs. Pt also voiced that he has to hold his excretions at times to make sure that he as available help with his care. Informed pt that a LCSW with [facility] will be referred to his case. Pt voiced that he needs some care in the home. Pt states that he has not heard from home health services.      Disability Status  Is the patient alert and oriented (person, place, time, and situation)?: Alert and oriented x 4  Hearing Difficulty or Deaf: no  Visual Difficulty or Blind: no  Visual and Hearing Needs Conclusion: Pt reports that he wears  eyeglasses. Pt voiced no changes with his vision or hearing.  Difficulty Concentrating, Remembering or Making Decisions: no  Communication Difficulty: no  Eating/Swallowing Difficulty: no  Walking or Climbing Stairs Difficulty: yes  Walking or Climbing Stairs: ambulation difficulty, requires equipment; ambulation difficulty, assistance 1 person  Dressing/Bathing Difficulty: yes  Dressing/Bathing: bathing difficulty, assistance 1 person  Toileting : Assistance Required  Continence : Incontinence - Bowel  Difficulty Managing Errands Independently: yes  Equipment Currently Used at Home: blood pressure machine; CPAP; glucometer; walker, standard; cane, straight  ADL Conclusion Statement: Pt voiced that he requires assistance with all ALDs at this time.  Change in Functional Status Since Onset of Current Illness/Injury: yes        Spiritual Beliefs  Spiritual, Cultural Beliefs, Voodoo Practices, Values that Affect Care: no      Social History     Socioeconomic History    Marital status:     Number of children: 8    Highest education level: 11th grade   Occupational History    Occupation:    Tobacco Use    Smoking status: Never    Smokeless tobacco: Never   Substance and Sexual Activity    Alcohol use: Yes     Alcohol/week: 0.0 standard drinks of alcohol     Comment: occacionally    Drug use: No    Sexual activity: Not Currently     Social Determinants of Health     Financial Resource Strain: Low Risk  (3/7/2023)    Overall Financial Resource Strain (CARDIA)     Difficulty of Paying Living Expenses: Not very hard   Food Insecurity: No Food Insecurity (3/7/2023)    Hunger Vital Sign     Worried About Running Out of Food in the Last Year: Never true     Ran Out of Food in the Last Year: Never true   Transportation Needs: No Transportation Needs (3/7/2023)    PRAPARE - Transportation     Lack of Transportation (Medical): No     Lack of Transportation (Non-Medical): No   Physical Activity: Inactive  (3/7/2023)    Exercise Vital Sign     Days of Exercise per Week: 0 days     Minutes of Exercise per Session: 0 min   Stress: No Stress Concern Present (3/7/2023)    [employer] [place] [employer] - Occupational Stress Questionnaire     Feeling of Stress : Not at all   Social Connections: Socially Isolated (3/7/2023)    Social Connection and Isolation Panel [NHANES]     Frequency of Communication with Friends and Family: More than three times a week     Frequency of Social Gatherings with Friends and Family: More than three times a week     Attends Protestant Services: Never     Active Member of Clubs or Organizations: No     Attends Club or Organization Meetings: Never     Marital Status:    Housing Stability: Unknown (3/7/2023)    Housing Stability Vital Sign     Unable to Pay for Housing in the Last Year: No     Unstable Housing in the Last Year: No       Roles and Relationships  Primary Source of Support/Comfort: child(patricia)  Name of Support/Comfort Primary Source: Reports that his son helps with his ADLs.      Advance Directives (For Healthcare)  Advance Directive  (If Adv Dir status is received, view document under Adv Dir in header or Chart Review Media tab): Patient does not have Advance Directive, requests information.  Patient Requests Assistance: Handouts provided        Patient Reported Insurance  Verified current insurance plan:: Other (see comment) (Pt reports that he has [employer].)            12/7/2023    10:44 AM 3/7/2023    10:07 AM 1/23/2023    10:39 AM 3/11/2022    10:28 AM 1/25/2022     1:56 PM 11/12/2021     2:25 PM 6/4/2021     9:17 AM   Depression Patient Health Questionnaire   Over the last two weeks how often have you been bothered by little interest or pleasure in doing things Not at all Not at all Not at all Not at all Not at all Not at all Not at all   Over the last two weeks how often have you been bothered by feeling down, depressed or hopeless Several  days Not at all Not at all Not at all Not at all Not at all Not at all   PHQ-2 Total Score 1 0 0 0 0 0 0       Learning Assessment       12/07/2023 1151 [facility] (12/7/2023 - Present)   Created by Sharla Sheriff RN - RN (Nurse) Status: Complete                 PRIMARY LEARNER     Primary Learner Name:  Bj Jr. Herlinda  - 12/07/2023 1151    Relationship:  Patient  - 12/07/2023 1151    Does the primary learner have any barriers to learning?:  No Barriers  - 12/07/2023 1151    What is the preferred language of the primary learner?:  English  - 12/07/2023 1151    Is an  required?:  No  - 12/07/2023 1151    How does the primary learner prefer to learn new concepts?:  Listening, Reading  - 12/07/2023 1151    How often do you need to have someone help you read instructions, pamphlets, or written material from your doctor or pharmacy?:  Never  - 12/07/2023 1151        CO-LEARNER #1     No question answered        CO-LEARNER #2     No question answered        SPECIAL TOPICS     No question answered        ANSWERED BY:     No question answered        Edit History       Sharla Sheriff, RN - RN (Nurse)   12/07/2023 1151

## 2023-12-07 NOTE — LETTER
December 7, 2023             Dear Bj,    Welcome to [facility] Complex [facility].  It was a pleasure talking with you today.  My name is Sharla Sheriff RN and I look forward to being your Care Manager.  My goal is to help you function at the healthiest and highest level possible.  You can contact me directly at [phone].    As an [facility] patient, some of the services we may be able to provide include:     Development of an individualized care plan with a Registered Nurse   Connection with a   Connection with available resources and services    Coordinate communication among your care team members   Provide coaching and education   Help you understand your doctors treatment plan  Help you obtain information about your insurance coverage.     All services provided by [facility] Complex Care Managers and other care team members are coordinated with and communicated to your primary care team.      As part of your enrollment, you will be receiving education materials and more information about these services in your [facility] account, by phone or through the mail.  If you do not wish to participate or receive information, please contact our office at [phone].      Sincerely,        Sharla Sheriff, RN  [facility]   Out-patient RN Complex Care Manager

## 2023-12-08 ENCOUNTER — TELEPHONE (OUTPATIENT)
Dept: FAMILY MEDICINE | Facility: CLINIC | Age: 73
End: 2023-12-08
Payer: MEDICARE

## 2023-12-08 ENCOUNTER — OUTPATIENT CASE MANAGEMENT (OUTPATIENT)
Dept: ADMINISTRATIVE | Facility: OTHER | Age: 73
End: 2023-12-08
Payer: MEDICARE

## 2023-12-08 NOTE — PROGRESS NOTES
Outpatient Care Management  Plan of Care Follow Up Visit    Patient: Bj Pate Jr.  MRN: [id number]  Date of Service: 12/08/2023  Completed by: Sharla Sheriff RN  Referral Date:     No chief complaint on file.      Brief Summary: Message sent to pt's PCP staff to notify of his reported decline since his last MD visit. Also notified of pt's interest in care at home for a NP visit. Call placed to [facility] for a status update. Informed that the pt will be contacted, but they are experiencing a delay with enrollment at this time.

## 2023-12-08 NOTE — TELEPHONE ENCOUNTER
----- Message from Sharla Sheriff RN sent at 12/8/2023 10:41 AM CST -----  Pipo this is Sharla with [facility]. Pt reports that he has a decline in his health since his last appt. Pt voiced that he has generalized weakness and requires assistance with his ADLs. Pt voiced that he would be interested in the care at home for a NP to visit him in the home. Pt also reports that he has not heard from [facility]. I will contact for a status update with [facility].    Please advise  Thank you

## 2023-12-10 ENCOUNTER — HOSPITAL ENCOUNTER (INPATIENT)
Facility: HOSPITAL | Age: 73
LOS: 18 days | Discharge: NURSING, REHAB OR LONG-TERM CARE | DRG: 853 | End: 2023-12-29
Attending: STUDENT IN AN ORGANIZED HEALTH CARE EDUCATION/TRAINING PROGRAM | Admitting: STUDENT IN AN ORGANIZED HEALTH CARE EDUCATION/TRAINING PROGRAM
Payer: MEDICARE

## 2023-12-10 DIAGNOSIS — R74.01 TRANSAMINITIS: ICD-10-CM

## 2023-12-10 DIAGNOSIS — N18.30 STAGE 3 CHRONIC KIDNEY DISEASE, UNSPECIFIED WHETHER STAGE 3A OR 3B CKD: ICD-10-CM

## 2023-12-10 DIAGNOSIS — L30.4 INTERTRIGINOUS DERMATITIS ASSOCIATED WITH MOISTURE: ICD-10-CM

## 2023-12-10 DIAGNOSIS — I38 ENDOCARDITIS: ICD-10-CM

## 2023-12-10 DIAGNOSIS — R33.9 URINARY RETENTION: ICD-10-CM

## 2023-12-10 DIAGNOSIS — R79.89 TROPONIN I ABOVE REFERENCE RANGE: ICD-10-CM

## 2023-12-10 DIAGNOSIS — G93.41 ACUTE METABOLIC ENCEPHALOPATHY: ICD-10-CM

## 2023-12-10 DIAGNOSIS — R53.81 DEBILITY: ICD-10-CM

## 2023-12-10 DIAGNOSIS — A41.9 SEPSIS, DUE TO UNSPECIFIED ORGANISM, UNSPECIFIED WHETHER ACUTE ORGAN DYSFUNCTION PRESENT: ICD-10-CM

## 2023-12-10 DIAGNOSIS — G47.33 OSA ON CPAP: ICD-10-CM

## 2023-12-10 DIAGNOSIS — E83.42 HYPOMAGNESEMIA: Primary | ICD-10-CM

## 2023-12-10 DIAGNOSIS — G81.90 HEMIPARESIS, UNSPECIFIED HEMIPARESIS ETIOLOGY, UNSPECIFIED LATERALITY: ICD-10-CM

## 2023-12-10 DIAGNOSIS — R00.0 TACHYCARDIA: ICD-10-CM

## 2023-12-10 DIAGNOSIS — R60.0 BILATERAL LOWER EXTREMITY EDEMA: ICD-10-CM

## 2023-12-10 DIAGNOSIS — M46.40 DISCITIS, UNSPECIFIED SPINAL REGION: ICD-10-CM

## 2023-12-10 DIAGNOSIS — G93.41 SEPTIC ENCEPHALOPATHY: ICD-10-CM

## 2023-12-10 DIAGNOSIS — M46.46 DISCITIS OF LUMBAR REGION: ICD-10-CM

## 2023-12-10 DIAGNOSIS — R07.9 CHEST PAIN: ICD-10-CM

## 2023-12-10 DIAGNOSIS — M46.26 ACUTE OSTEOMYELITIS OF LUMBAR SPINE: ICD-10-CM

## 2023-12-10 DIAGNOSIS — E66.9 CLASS 2 OBESITY WITH BODY MASS INDEX (BMI) OF 39.0 TO 39.9 IN ADULT, UNSPECIFIED OBESITY TYPE, UNSPECIFIED WHETHER SERIOUS COMORBIDITY PRESENT: ICD-10-CM

## 2023-12-10 LAB
ALBUMIN SERPL BCP-MCNC: 2.4 G/DL (ref 3.5–5.2)
ALLENS TEST: ABNORMAL
ALP SERPL-CCNC: 73 U/L (ref 55–135)
ALT SERPL W/O P-5'-P-CCNC: 20 U/L (ref 10–44)
ANION GAP SERPL CALC-SCNC: 15 MMOL/L (ref 8–16)
AST SERPL-CCNC: 30 U/L (ref 10–40)
BASOPHILS # BLD AUTO: 0.02 K/UL (ref 0–0.2)
BASOPHILS NFR BLD: 0.2 % (ref 0–1.9)
BILIRUB SERPL-MCNC: 0.9 MG/DL (ref 0.1–1)
BILIRUB UR QL STRIP: NEGATIVE
BNP SERPL-MCNC: 47 PG/ML (ref 0–99)
BUN SERPL-MCNC: 27 MG/DL (ref 8–23)
CALCIUM SERPL-MCNC: 10.4 MG/DL (ref 8.7–10.5)
CHLORIDE SERPL-SCNC: 100 MMOL/L (ref 95–110)
CLARITY UR REFRACT.AUTO: CLEAR
CO2 SERPL-SCNC: 26 MMOL/L (ref 23–29)
COLOR UR AUTO: YELLOW
CREAT SERPL-MCNC: 1.3 MG/DL (ref 0.5–1.4)
DIFFERENTIAL METHOD BLD: ABNORMAL
EOSINOPHIL # BLD AUTO: 0 K/UL (ref 0–0.5)
EOSINOPHIL NFR BLD: 0.1 % (ref 0–8)
ERYTHROCYTE [DISTWIDTH] IN BLOOD BY AUTOMATED COUNT: 14.3 % (ref 11.5–14.5)
EST. GFR  (NO RACE VARIABLE): 58.7 ML/MIN/1.73 M^2
GLUCOSE SERPL-MCNC: 123 MG/DL (ref 70–110)
GLUCOSE UR QL STRIP: NEGATIVE
HCT VFR BLD AUTO: 33.5 % (ref 40–54)
HGB BLD-MCNC: 11 G/DL (ref 14–18)
HGB UR QL STRIP: NEGATIVE
IMM GRANULOCYTES # BLD AUTO: 0.07 K/UL (ref 0–0.04)
IMM GRANULOCYTES NFR BLD AUTO: 0.5 % (ref 0–0.5)
KETONES UR QL STRIP: ABNORMAL
LDH SERPL L TO P-CCNC: 2.68 MMOL/L (ref 0.5–2.2)
LEUKOCYTE ESTERASE UR QL STRIP: NEGATIVE
LYMPHOCYTES # BLD AUTO: 0.8 K/UL (ref 1–4.8)
LYMPHOCYTES NFR BLD: 6 % (ref 18–48)
MAGNESIUM SERPL-MCNC: 1.5 MG/DL (ref 1.6–2.6)
MCH RBC QN AUTO: 30.3 PG (ref 27–31)
MCHC RBC AUTO-ENTMCNC: 32.8 G/DL (ref 32–36)
MCV RBC AUTO: 92 FL (ref 82–98)
MONOCYTES # BLD AUTO: 1.9 K/UL (ref 0.3–1)
MONOCYTES NFR BLD: 14.8 % (ref 4–15)
NEUTROPHILS # BLD AUTO: 10 K/UL (ref 1.8–7.7)
NEUTROPHILS NFR BLD: 78.4 % (ref 38–73)
NITRITE UR QL STRIP: NEGATIVE
NRBC BLD-RTO: 0 /100 WBC
PH UR STRIP: 6 [PH] (ref 5–8)
PLATELET # BLD AUTO: 257 K/UL (ref 150–450)
PMV BLD AUTO: 10.4 FL (ref 9.2–12.9)
POTASSIUM SERPL-SCNC: 4.3 MMOL/L (ref 3.5–5.1)
PROT SERPL-MCNC: 7.9 G/DL (ref 6–8.4)
PROT UR QL STRIP: ABNORMAL
RBC # BLD AUTO: 3.63 M/UL (ref 4.6–6.2)
SAMPLE: ABNORMAL
SITE: ABNORMAL
SODIUM SERPL-SCNC: 141 MMOL/L (ref 136–145)
SP GR UR STRIP: >1.03 (ref 1–1.03)
TROPONIN I SERPL DL<=0.01 NG/ML-MCNC: 0.13 NG/ML (ref 0–0.03)
URN SPEC COLLECT METH UR: ABNORMAL
WBC # BLD AUTO: 12.74 K/UL (ref 3.9–12.7)

## 2023-12-10 PROCEDURE — 83605 ASSAY OF LACTIC ACID: CPT

## 2023-12-10 PROCEDURE — 93005 ELECTROCARDIOGRAM TRACING: CPT

## 2023-12-10 PROCEDURE — 96361 HYDRATE IV INFUSION ADD-ON: CPT

## 2023-12-10 PROCEDURE — 25500020 PHARM REV CODE 255: Performed by: STUDENT IN AN ORGANIZED HEALTH CARE EDUCATION/TRAINING PROGRAM

## 2023-12-10 PROCEDURE — 87040 BLOOD CULTURE FOR BACTERIA: CPT | Mod: 59 | Performed by: STUDENT IN AN ORGANIZED HEALTH CARE EDUCATION/TRAINING PROGRAM

## 2023-12-10 PROCEDURE — 82962 GLUCOSE BLOOD TEST: CPT

## 2023-12-10 PROCEDURE — 85652 RBC SED RATE AUTOMATED: CPT | Performed by: STUDENT IN AN ORGANIZED HEALTH CARE EDUCATION/TRAINING PROGRAM

## 2023-12-10 PROCEDURE — 99900035 HC TECH TIME PER 15 MIN (STAT)

## 2023-12-10 PROCEDURE — 85025 COMPLETE CBC W/AUTO DIFF WBC: CPT | Performed by: STUDENT IN AN ORGANIZED HEALTH CARE EDUCATION/TRAINING PROGRAM

## 2023-12-10 PROCEDURE — 93010 ELECTROCARDIOGRAM REPORT: CPT | Mod: ,,, | Performed by: INTERNAL MEDICINE

## 2023-12-10 PROCEDURE — 96365 THER/PROPH/DIAG IV INF INIT: CPT

## 2023-12-10 PROCEDURE — 96375 TX/PRO/DX INJ NEW DRUG ADDON: CPT

## 2023-12-10 PROCEDURE — 86140 C-REACTIVE PROTEIN: CPT | Performed by: STUDENT IN AN ORGANIZED HEALTH CARE EDUCATION/TRAINING PROGRAM

## 2023-12-10 PROCEDURE — 84484 ASSAY OF TROPONIN QUANT: CPT | Performed by: STUDENT IN AN ORGANIZED HEALTH CARE EDUCATION/TRAINING PROGRAM

## 2023-12-10 PROCEDURE — 83735 ASSAY OF MAGNESIUM: CPT | Performed by: STUDENT IN AN ORGANIZED HEALTH CARE EDUCATION/TRAINING PROGRAM

## 2023-12-10 PROCEDURE — 63600175 PHARM REV CODE 636 W HCPCS: Performed by: STUDENT IN AN ORGANIZED HEALTH CARE EDUCATION/TRAINING PROGRAM

## 2023-12-10 PROCEDURE — 81003 URINALYSIS AUTO W/O SCOPE: CPT | Performed by: STUDENT IN AN ORGANIZED HEALTH CARE EDUCATION/TRAINING PROGRAM

## 2023-12-10 PROCEDURE — 83880 ASSAY OF NATRIURETIC PEPTIDE: CPT | Performed by: STUDENT IN AN ORGANIZED HEALTH CARE EDUCATION/TRAINING PROGRAM

## 2023-12-10 PROCEDURE — 25000003 PHARM REV CODE 250: Performed by: STUDENT IN AN ORGANIZED HEALTH CARE EDUCATION/TRAINING PROGRAM

## 2023-12-10 PROCEDURE — 80053 COMPREHEN METABOLIC PANEL: CPT | Performed by: STUDENT IN AN ORGANIZED HEALTH CARE EDUCATION/TRAINING PROGRAM

## 2023-12-10 PROCEDURE — 99285 EMERGENCY DEPT VISIT HI MDM: CPT | Mod: 25

## 2023-12-10 RX ORDER — MAGNESIUM SULFATE 1 G/100ML
1 INJECTION INTRAVENOUS ONCE
Status: COMPLETED | OUTPATIENT
Start: 2023-12-10 | End: 2023-12-10

## 2023-12-10 RX ORDER — ACETAMINOPHEN 325 MG/1
650 TABLET ORAL
Status: COMPLETED | OUTPATIENT
Start: 2023-12-10 | End: 2023-12-10

## 2023-12-10 RX ORDER — MORPHINE SULFATE 4 MG/ML
4 INJECTION, SOLUTION INTRAMUSCULAR; INTRAVENOUS
Status: COMPLETED | OUTPATIENT
Start: 2023-12-10 | End: 2023-12-11

## 2023-12-10 RX ADMIN — IOHEXOL 100 ML: 350 INJECTION, SOLUTION INTRAVENOUS at 09:12

## 2023-12-10 RX ADMIN — ACETAMINOPHEN 650 MG: 325 TABLET ORAL at 09:12

## 2023-12-10 RX ADMIN — MAGNESIUM SULFATE HEPTAHYDRATE 1 G: 500 INJECTION, SOLUTION INTRAMUSCULAR; INTRAVENOUS at 09:12

## 2023-12-10 RX ADMIN — SODIUM CHLORIDE, POTASSIUM CHLORIDE, SODIUM LACTATE AND CALCIUM CHLORIDE 1000 ML: 600; 310; 30; 20 INJECTION, SOLUTION INTRAVENOUS at 08:12

## 2023-12-10 NOTE — Clinical Note
Diagnosis: Hypomagnesemia [395563]   Future Attending Provider: KRISTINE PORTER [[id number]]   Admitting Provider:: KRISTINE PORTER [[id number]]   Reason for IP Medical Treatment  (Clinical interventions that can only be accomplished in the IP setting? ) :: lumbar discitits   Reason for IP Medical Treatment  (Clinical interventions that can only be accomplished in the IP setting? ) :: lumbar osteomyelitis   I certify that Inpatient services for greater than or equal to 2 midnights are medically necessary:: Yes   Plans for Post-Acute care--if anticipated (pick the single best option):: A. No post acute care anticipated at this time

## 2023-12-11 PROBLEM — R65.20 SEVERE SEPSIS: Status: ACTIVE | Noted: 2023-12-11

## 2023-12-11 PROBLEM — A41.9 SEVERE SEPSIS: Status: ACTIVE | Noted: 2023-12-11

## 2023-12-11 PROBLEM — M46.26 ACUTE OSTEOMYELITIS OF LUMBAR SPINE: Status: ACTIVE | Noted: 2023-12-11

## 2023-12-11 PROBLEM — R41.82 AMS (ALTERED MENTAL STATUS): Status: ACTIVE | Noted: 2023-12-11

## 2023-12-11 LAB
ALBUMIN SERPL BCP-MCNC: 2.1 G/DL (ref 3.5–5.2)
ALLENS TEST: NORMAL
ALP SERPL-CCNC: 78 U/L (ref 55–135)
ALT SERPL W/O P-5'-P-CCNC: 30 U/L (ref 10–44)
ANION GAP SERPL CALC-SCNC: 10 MMOL/L (ref 8–16)
ANION GAP SERPL CALC-SCNC: 9 MMOL/L (ref 8–16)
AST SERPL-CCNC: 55 U/L (ref 10–40)
BASOPHILS # BLD AUTO: 0.02 K/UL (ref 0–0.2)
BASOPHILS # BLD AUTO: 0.02 K/UL (ref 0–0.2)
BASOPHILS NFR BLD: 0.1 % (ref 0–1.9)
BASOPHILS NFR BLD: 0.2 % (ref 0–1.9)
BILIRUB SERPL-MCNC: 1.3 MG/DL (ref 0.1–1)
BUN SERPL-MCNC: 21 MG/DL (ref 8–23)
BUN SERPL-MCNC: 23 MG/DL (ref 8–23)
CALCIUM SERPL-MCNC: 10 MG/DL (ref 8.7–10.5)
CALCIUM SERPL-MCNC: 9.8 MG/DL (ref 8.7–10.5)
CHLORIDE SERPL-SCNC: 102 MMOL/L (ref 95–110)
CHLORIDE SERPL-SCNC: 102 MMOL/L (ref 95–110)
CO2 SERPL-SCNC: 25 MMOL/L (ref 23–29)
CO2 SERPL-SCNC: 26 MMOL/L (ref 23–29)
CREAT SERPL-MCNC: 1.1 MG/DL (ref 0.5–1.4)
CREAT SERPL-MCNC: 1.2 MG/DL (ref 0.5–1.4)
CRP SERPL-MCNC: 375.1 MG/L (ref 0–8.2)
DIFFERENTIAL METHOD BLD: ABNORMAL
DIFFERENTIAL METHOD BLD: ABNORMAL
EOSINOPHIL # BLD AUTO: 0 K/UL (ref 0–0.5)
EOSINOPHIL # BLD AUTO: 0 K/UL (ref 0–0.5)
EOSINOPHIL NFR BLD: 0 % (ref 0–8)
EOSINOPHIL NFR BLD: 0.2 % (ref 0–8)
ERYTHROCYTE [DISTWIDTH] IN BLOOD BY AUTOMATED COUNT: 13.9 % (ref 11.5–14.5)
ERYTHROCYTE [DISTWIDTH] IN BLOOD BY AUTOMATED COUNT: 14.2 % (ref 11.5–14.5)
ERYTHROCYTE [SEDIMENTATION RATE] IN BLOOD BY PHOTOMETRIC METHOD: >120 MM/HR (ref 0–23)
EST. GFR  (NO RACE VARIABLE): >60 ML/MIN/1.73 M^2
EST. GFR  (NO RACE VARIABLE): >60 ML/MIN/1.73 M^2
ESTIMATED AVG GLUCOSE: 94 MG/DL (ref 68–131)
GLUCOSE SERPL-MCNC: 99 MG/DL (ref 70–110)
GLUCOSE SERPL-MCNC: 99 MG/DL (ref 70–110)
HBA1C MFR BLD: 4.9 % (ref 4–5.6)
HCT VFR BLD AUTO: 33.5 % (ref 40–54)
HCT VFR BLD AUTO: 33.5 % (ref 40–54)
HGB BLD-MCNC: 11 G/DL (ref 14–18)
HGB BLD-MCNC: 11.1 G/DL (ref 14–18)
IMM GRANULOCYTES # BLD AUTO: 0.05 K/UL (ref 0–0.04)
IMM GRANULOCYTES # BLD AUTO: 0.11 K/UL (ref 0–0.04)
IMM GRANULOCYTES NFR BLD AUTO: 0.4 % (ref 0–0.5)
IMM GRANULOCYTES NFR BLD AUTO: 0.7 % (ref 0–0.5)
INFLUENZA A, MOLECULAR: NOT DETECTED
INFLUENZA B, MOLECULAR: NOT DETECTED
LACTATE SERPL-SCNC: 1.4 MMOL/L (ref 0.5–2.2)
LDH SERPL L TO P-CCNC: 1.31 MMOL/L (ref 0.5–2.2)
LYMPHOCYTES # BLD AUTO: 0.6 K/UL (ref 1–4.8)
LYMPHOCYTES # BLD AUTO: 1.2 K/UL (ref 1–4.8)
LYMPHOCYTES NFR BLD: 10 % (ref 18–48)
LYMPHOCYTES NFR BLD: 3.8 % (ref 18–48)
MAGNESIUM SERPL-MCNC: 1.6 MG/DL (ref 1.6–2.6)
MAGNESIUM SERPL-MCNC: 1.6 MG/DL (ref 1.6–2.6)
MCH RBC QN AUTO: 30.4 PG (ref 27–31)
MCH RBC QN AUTO: 30.7 PG (ref 27–31)
MCHC RBC AUTO-ENTMCNC: 32.8 G/DL (ref 32–36)
MCHC RBC AUTO-ENTMCNC: 33.1 G/DL (ref 32–36)
MCV RBC AUTO: 93 FL (ref 82–98)
MCV RBC AUTO: 93 FL (ref 82–98)
MONOCYTES # BLD AUTO: 1.8 K/UL (ref 0.3–1)
MONOCYTES # BLD AUTO: 2 K/UL (ref 0.3–1)
MONOCYTES NFR BLD: 12 % (ref 4–15)
MONOCYTES NFR BLD: 14.8 % (ref 4–15)
NEUTROPHILS # BLD AUTO: 13.6 K/UL (ref 1.8–7.7)
NEUTROPHILS # BLD AUTO: 8.8 K/UL (ref 1.8–7.7)
NEUTROPHILS NFR BLD: 74.4 % (ref 38–73)
NEUTROPHILS NFR BLD: 83.4 % (ref 38–73)
NRBC BLD-RTO: 0 /100 WBC
NRBC BLD-RTO: 0 /100 WBC
PHOSPHATE SERPL-MCNC: 3.9 MG/DL (ref 2.7–4.5)
PLATELET # BLD AUTO: 264 K/UL (ref 150–450)
PLATELET # BLD AUTO: 281 K/UL (ref 150–450)
PMV BLD AUTO: 10.2 FL (ref 9.2–12.9)
PMV BLD AUTO: 10.4 FL (ref 9.2–12.9)
POCT GLUCOSE: 120 MG/DL (ref 70–110)
POCT GLUCOSE: 125 MG/DL (ref 70–110)
POTASSIUM SERPL-SCNC: 4.2 MMOL/L (ref 3.5–5.1)
POTASSIUM SERPL-SCNC: 4.4 MMOL/L (ref 3.5–5.1)
PROCALCITONIN SERPL IA-MCNC: 1.37 NG/ML
PROT SERPL-MCNC: 7.4 G/DL (ref 6–8.4)
RBC # BLD AUTO: 3.61 M/UL (ref 4.6–6.2)
RBC # BLD AUTO: 3.62 M/UL (ref 4.6–6.2)
RSV AG BY MOLECULAR METHOD: NOT DETECTED
SAMPLE: NORMAL
SARS-COV-2 RNA RESP QL NAA+PROBE: NOT DETECTED
SITE: NORMAL
SODIUM SERPL-SCNC: 136 MMOL/L (ref 136–145)
SODIUM SERPL-SCNC: 138 MMOL/L (ref 136–145)
WBC # BLD AUTO: 11.86 K/UL (ref 3.9–12.7)
WBC # BLD AUTO: 16.33 K/UL (ref 3.9–12.7)

## 2023-12-11 PROCEDURE — 25000003 PHARM REV CODE 250

## 2023-12-11 PROCEDURE — 83605 ASSAY OF LACTIC ACID: CPT

## 2023-12-11 PROCEDURE — 84145 PROCALCITONIN (PCT): CPT | Performed by: STUDENT IN AN ORGANIZED HEALTH CARE EDUCATION/TRAINING PROGRAM

## 2023-12-11 PROCEDURE — 20600001 HC STEP DOWN PRIVATE ROOM

## 2023-12-11 PROCEDURE — 63600175 PHARM REV CODE 636 W HCPCS

## 2023-12-11 PROCEDURE — 99284 EMERGENCY DEPT VISIT MOD MDM: CPT | Mod: ,,, | Performed by: PHYSICIAN ASSISTANT

## 2023-12-11 PROCEDURE — 25000003 PHARM REV CODE 250: Performed by: EMERGENCY MEDICINE

## 2023-12-11 PROCEDURE — 0T9B70Z DRAINAGE OF BLADDER WITH DRAINAGE DEVICE, VIA NATURAL OR ARTIFICIAL OPENING: ICD-10-PCS | Performed by: STUDENT IN AN ORGANIZED HEALTH CARE EDUCATION/TRAINING PROGRAM

## 2023-12-11 PROCEDURE — 84100 ASSAY OF PHOSPHORUS: CPT

## 2023-12-11 PROCEDURE — 83036 HEMOGLOBIN GLYCOSYLATED A1C: CPT

## 2023-12-11 PROCEDURE — 25000003 PHARM REV CODE 250: Performed by: STUDENT IN AN ORGANIZED HEALTH CARE EDUCATION/TRAINING PROGRAM

## 2023-12-11 PROCEDURE — 85025 COMPLETE CBC W/AUTO DIFF WBC: CPT | Mod: 91 | Performed by: STUDENT IN AN ORGANIZED HEALTH CARE EDUCATION/TRAINING PROGRAM

## 2023-12-11 PROCEDURE — A9585 GADOBUTROL INJECTION: HCPCS | Performed by: STUDENT IN AN ORGANIZED HEALTH CARE EDUCATION/TRAINING PROGRAM

## 2023-12-11 PROCEDURE — 63600175 PHARM REV CODE 636 W HCPCS: Performed by: STUDENT IN AN ORGANIZED HEALTH CARE EDUCATION/TRAINING PROGRAM

## 2023-12-11 PROCEDURE — 99223 1ST HOSP IP/OBS HIGH 75: CPT | Mod: ,,, | Performed by: NEUROLOGICAL SURGERY

## 2023-12-11 PROCEDURE — 25500020 PHARM REV CODE 255: Performed by: STUDENT IN AN ORGANIZED HEALTH CARE EDUCATION/TRAINING PROGRAM

## 2023-12-11 PROCEDURE — 93010 ELECTROCARDIOGRAM REPORT: CPT | Mod: ,,, | Performed by: INTERNAL MEDICINE

## 2023-12-11 PROCEDURE — 85025 COMPLETE CBC W/AUTO DIFF WBC: CPT

## 2023-12-11 PROCEDURE — 11000001 HC ACUTE MED/SURG PRIVATE ROOM

## 2023-12-11 PROCEDURE — 99900035 HC TECH TIME PER 15 MIN (STAT)

## 2023-12-11 PROCEDURE — 83735 ASSAY OF MAGNESIUM: CPT

## 2023-12-11 PROCEDURE — 93005 ELECTROCARDIOGRAM TRACING: CPT

## 2023-12-11 PROCEDURE — 36415 COLL VENOUS BLD VENIPUNCTURE: CPT | Performed by: STUDENT IN AN ORGANIZED HEALTH CARE EDUCATION/TRAINING PROGRAM

## 2023-12-11 PROCEDURE — 80048 BASIC METABOLIC PNL TOTAL CA: CPT | Mod: XB | Performed by: STUDENT IN AN ORGANIZED HEALTH CARE EDUCATION/TRAINING PROGRAM

## 2023-12-11 PROCEDURE — 63600175 PHARM REV CODE 636 W HCPCS: Performed by: EMERGENCY MEDICINE

## 2023-12-11 PROCEDURE — 84484 ASSAY OF TROPONIN QUANT: CPT | Performed by: STUDENT IN AN ORGANIZED HEALTH CARE EDUCATION/TRAINING PROGRAM

## 2023-12-11 PROCEDURE — 0241U SARS-COV2 (COVID) WITH FLU/RSV BY PCR: CPT | Performed by: STUDENT IN AN ORGANIZED HEALTH CARE EDUCATION/TRAINING PROGRAM

## 2023-12-11 PROCEDURE — 83735 ASSAY OF MAGNESIUM: CPT | Mod: 91 | Performed by: STUDENT IN AN ORGANIZED HEALTH CARE EDUCATION/TRAINING PROGRAM

## 2023-12-11 PROCEDURE — 80053 COMPREHEN METABOLIC PANEL: CPT

## 2023-12-11 RX ORDER — INSULIN ASPART 100 [IU]/ML
0-5 INJECTION, SOLUTION INTRAVENOUS; SUBCUTANEOUS
Status: DISCONTINUED | OUTPATIENT
Start: 2023-12-11 | End: 2023-12-14

## 2023-12-11 RX ORDER — TALC
6 POWDER (GRAM) TOPICAL NIGHTLY PRN
Status: DISCONTINUED | OUTPATIENT
Start: 2023-12-11 | End: 2023-12-17

## 2023-12-11 RX ORDER — DEXTROMETHORPHAN HYDROBROMIDE, GUAIFENESIN 5; 100 MG/5ML; MG/5ML
1300 LIQUID ORAL EVERY 6 HOURS PRN
Status: ON HOLD | COMMUNITY
End: 2023-12-18 | Stop reason: CLARIF

## 2023-12-11 RX ORDER — SODIUM CHLORIDE, SODIUM LACTATE, POTASSIUM CHLORIDE, CALCIUM CHLORIDE 600; 310; 30; 20 MG/100ML; MG/100ML; MG/100ML; MG/100ML
INJECTION, SOLUTION INTRAVENOUS CONTINUOUS
Status: ACTIVE | OUTPATIENT
Start: 2023-12-11 | End: 2023-12-11

## 2023-12-11 RX ORDER — MAGNESIUM SULFATE HEPTAHYDRATE 40 MG/ML
2 INJECTION, SOLUTION INTRAVENOUS ONCE
Status: COMPLETED | OUTPATIENT
Start: 2023-12-11 | End: 2023-12-12

## 2023-12-11 RX ORDER — ACETAMINOPHEN 650 MG/1
650 SUPPOSITORY RECTAL EVERY 6 HOURS PRN
Status: DISCONTINUED | OUTPATIENT
Start: 2023-12-11 | End: 2023-12-15

## 2023-12-11 RX ORDER — GABAPENTIN 300 MG/1
600 CAPSULE ORAL 3 TIMES DAILY
Status: DISCONTINUED | OUTPATIENT
Start: 2023-12-11 | End: 2023-12-13

## 2023-12-11 RX ORDER — ALBUTEROL SULFATE 90 UG/1
2 AEROSOL, METERED RESPIRATORY (INHALATION) EVERY 6 HOURS PRN
Status: DISCONTINUED | OUTPATIENT
Start: 2023-12-11 | End: 2023-12-12

## 2023-12-11 RX ORDER — HYDROCODONE BITARTRATE AND ACETAMINOPHEN 5; 325 MG/1; MG/1
1 TABLET ORAL EVERY 6 HOURS PRN
Status: DISCONTINUED | OUTPATIENT
Start: 2023-12-11 | End: 2023-12-12

## 2023-12-11 RX ORDER — CYCLOBENZAPRINE HCL 10 MG
10 TABLET ORAL 3 TIMES DAILY PRN
Status: ON HOLD | COMMUNITY
Start: 2023-11-19 | End: 2023-12-18 | Stop reason: CLARIF

## 2023-12-11 RX ORDER — IBUPROFEN 200 MG
16 TABLET ORAL
Status: DISCONTINUED | OUTPATIENT
Start: 2023-12-11 | End: 2023-12-20

## 2023-12-11 RX ORDER — LEVALBUTEROL INHALATION SOLUTION 0.63 MG/3ML
0.63 SOLUTION RESPIRATORY (INHALATION) ONCE
Status: COMPLETED | OUTPATIENT
Start: 2023-12-11 | End: 2023-12-12

## 2023-12-11 RX ORDER — ONDANSETRON 4 MG/1
4 TABLET, ORALLY DISINTEGRATING ORAL EVERY 8 HOURS PRN
Status: DISCONTINUED | OUTPATIENT
Start: 2023-12-11 | End: 2023-12-29 | Stop reason: HOSPADM

## 2023-12-11 RX ORDER — ATORVASTATIN CALCIUM 40 MG/1
40 TABLET, FILM COATED ORAL DAILY
Status: DISCONTINUED | OUTPATIENT
Start: 2023-12-11 | End: 2023-12-15

## 2023-12-11 RX ORDER — DOCUSATE SODIUM 100 MG/1
100 CAPSULE, LIQUID FILLED ORAL DAILY PRN
Status: ON HOLD | COMMUNITY
End: 2023-12-18 | Stop reason: CLARIF

## 2023-12-11 RX ORDER — FLUTICASONE FUROATE AND VILANTEROL 200; 25 UG/1; UG/1
2 POWDER RESPIRATORY (INHALATION) DAILY
Status: DISCONTINUED | OUTPATIENT
Start: 2023-12-11 | End: 2023-12-12

## 2023-12-11 RX ORDER — IBUPROFEN 200 MG
24 TABLET ORAL
Status: DISCONTINUED | OUTPATIENT
Start: 2023-12-11 | End: 2023-12-20

## 2023-12-11 RX ORDER — ACETAMINOPHEN 325 MG/1
650 TABLET ORAL EVERY 4 HOURS PRN
Status: DISCONTINUED | OUTPATIENT
Start: 2023-12-11 | End: 2023-12-11

## 2023-12-11 RX ORDER — GADOBUTROL 604.72 MG/ML
10 INJECTION INTRAVENOUS
Status: COMPLETED | OUTPATIENT
Start: 2023-12-11 | End: 2023-12-11

## 2023-12-11 RX ORDER — NALOXONE HCL 0.4 MG/ML
0.02 VIAL (ML) INJECTION
Status: DISCONTINUED | OUTPATIENT
Start: 2023-12-11 | End: 2023-12-18

## 2023-12-11 RX ORDER — METRONIDAZOLE 500 MG/100ML
500 INJECTION, SOLUTION INTRAVENOUS
Status: DISCONTINUED | OUTPATIENT
Start: 2023-12-11 | End: 2023-12-12

## 2023-12-11 RX ORDER — SODIUM CHLORIDE 0.9 % (FLUSH) 0.9 %
10 SYRINGE (ML) INJECTION EVERY 12 HOURS PRN
Status: DISCONTINUED | OUTPATIENT
Start: 2023-12-11 | End: 2023-12-18

## 2023-12-11 RX ORDER — GLUCAGON 1 MG
1 KIT INJECTION
Status: DISCONTINUED | OUTPATIENT
Start: 2023-12-11 | End: 2023-12-20

## 2023-12-11 RX ORDER — MAG HYDROX/ALUMINUM HYD/SIMETH 200-200-20
30 SUSPENSION, ORAL (FINAL DOSE FORM) ORAL 4 TIMES DAILY PRN
Status: DISCONTINUED | OUTPATIENT
Start: 2023-12-11 | End: 2023-12-18

## 2023-12-11 RX ADMIN — VANCOMYCIN HYDROCHLORIDE 2000 MG: 500 INJECTION, POWDER, LYOPHILIZED, FOR SOLUTION INTRAVENOUS at 11:12

## 2023-12-11 RX ADMIN — ACETAMINOPHEN 650 MG: 325 TABLET ORAL at 11:12

## 2023-12-11 RX ADMIN — METRONIDAZOLE 500 MG: 500 INJECTION, SOLUTION INTRAVENOUS at 09:12

## 2023-12-11 RX ADMIN — GABAPENTIN 600 MG: 300 CAPSULE ORAL at 10:12

## 2023-12-11 RX ADMIN — CEFEPIME 2 G: 2 INJECTION, POWDER, FOR SOLUTION INTRAVENOUS at 12:12

## 2023-12-11 RX ADMIN — SODIUM CHLORIDE, POTASSIUM CHLORIDE, SODIUM LACTATE AND CALCIUM CHLORIDE: 600; 310; 30; 20 INJECTION, SOLUTION INTRAVENOUS at 11:12

## 2023-12-11 RX ADMIN — CEFEPIME 2 G: 2 INJECTION, POWDER, FOR SOLUTION INTRAVENOUS at 11:12

## 2023-12-11 RX ADMIN — VANCOMYCIN HYDROCHLORIDE 2500 MG: 1 INJECTION, POWDER, LYOPHILIZED, FOR SOLUTION INTRAVENOUS at 01:12

## 2023-12-11 RX ADMIN — ATORVASTATIN CALCIUM 40 MG: 40 TABLET, FILM COATED ORAL at 10:12

## 2023-12-11 RX ADMIN — SODIUM CHLORIDE, POTASSIUM CHLORIDE, SODIUM LACTATE AND CALCIUM CHLORIDE: 600; 310; 30; 20 INJECTION, SOLUTION INTRAVENOUS at 05:12

## 2023-12-11 RX ADMIN — GADOBUTROL 10 ML: 604.72 INJECTION INTRAVENOUS at 02:12

## 2023-12-11 RX ADMIN — SODIUM CHLORIDE, POTASSIUM CHLORIDE, SODIUM LACTATE AND CALCIUM CHLORIDE 500 ML: 600; 310; 30; 20 INJECTION, SOLUTION INTRAVENOUS at 09:12

## 2023-12-11 RX ADMIN — MORPHINE SULFATE 4 MG: 4 INJECTION INTRAVENOUS at 01:12

## 2023-12-11 RX ADMIN — ACETAMINOPHEN 650 MG: 650 SUPPOSITORY RECTAL at 10:12

## 2023-12-11 RX ADMIN — CEFEPIME 2 G: 2 INJECTION, POWDER, FOR SOLUTION INTRAVENOUS at 05:12

## 2023-12-11 NOTE — ASSESSMENT & PLAN NOTE
"This patient does have evidence of infective focus  My overall impression is sepsis.  Source:  Psoas Abscess, Osteomyelitis, Discitis, Possible Epidural abcess  Antibiotics given-   Antibiotics (72h ago, onward)      Start     Stop Route Frequency Ordered    12/11/23 2300  vancomycin 2 g in dextrose 5 % 500 mL IVPB         -- IV Every 24 hours (non-standard times) 12/11/23 0720    12/11/23 0800  ceFEPIme (MAXIPIME) 2 g in dextrose 5 % in water (D5W) 100 mL IVPB (MB+)         -- IV Every 8 hours (non-standard times) 12/11/23 0711    12/11/23 0752  vancomycin - pharmacy to dose  (vancomycin IVPB (PEDS and ADULTS))        See Hyperspace for full Linked Orders Report.    -- IV pharmacy to manage frequency 12/11/23 0654          Latest lactate reviewed-  No results for input(s): "LACTATE" in the last 72 hours.  Organ dysfunction indicate    Will Not start Pressors as bp is greater than 65.   Source control achieved by: abx ands possible interventions, pending nsgy and IR recs  "

## 2023-12-11 NOTE — PROGRESS NOTES
Pharmacist Renal Dose Adjustment Note    Bj Pate Jr. is a 73 y.o. male being treated with the medication cefepime    Patient Data:    Vital Signs (Most Recent):  Temp: 98.9 °F (37.2 °C) (12/11/23 0600)  Pulse: (!) 117 (12/11/23 0600)  Resp: (!) 26 (12/11/23 0600)  BP: 127/79 (12/11/23 0600)  SpO2: 98 % (12/11/23 0600) Vital Signs (72h Range):  Temp:  [98.6 °F (37 °C)-102 °F (38.9 °C)]   Pulse:  []   Resp:  [18-26]   BP: (120-149)/(66-86)   SpO2:  [95 %-100 %]      Recent Labs   Lab 12/10/23  2006   CREATININE 1.3     Serum creatinine: 1.3 mg/dL 12/10/23 2006  Estimated creatinine clearance: 75.1 mL/min    Medication: cefepime 2 grams every 12 hours will be changed to cefepime 2 grams every 8 hours    Alisa Calvert, AlvarezD

## 2023-12-11 NOTE — H&P
STAFF PHYSICIAN NOTE                                   Attending Attestation for Rounds with Resident  I have reviewed and concur with the resident Dr. Paredes's history, physical, assessment, and plan.  I have personally interviewed and examined the patient at bedside.  See below for my evaluation and additional findings.    Patient is a 73 year old female with PMH of HTN, DM2, CKD III, COPD, BPH,  lumbar DJD with chronic low back pain, old stroke with residual left leg weakness who presented to ED for worsening lower abdominal pain and back pain x 2 weeks and confusion x 1 day. Patient became lethargic and confused earlier today which prompted family to bring him to ED where he is found to have severe sepsis (fever, tachycardia, leukocytosis, lactic acidosis) with lumbar L2-3 discitis/osteomyelitis and left psoas abscess 2.2 cm per MRI. Pain improved with morphine and received broad spectrum vancomycin + cefepime in ED. He appears chronically ill, but not in distress.     This patient does have evidence of infective focus  My overall impression is sepsis.  Source:  lumbar discitis and psoas abscess   Antibiotics given-   Antibiotics (72h ago, onward)      Start     Stop Route Frequency Ordered    12/11/23 0800  ceFEPIme (MAXIPIME) 2 g in dextrose 5 % in water (D5W) 100 mL IVPB (MB+)         -- IV Every 12 hours (non-standard times) 12/11/23 0654    12/11/23 0752  vancomycin - pharmacy to dose  (vancomycin IVPB (PEDS and ADULTS))        See Hyperspace for full Linked Orders Report.    -- IV pharmacy to manage frequency 12/11/23 0654          Latest lactate reviewed-  Recent Labs   Lab 12/11/23  0421   POCLAC 1.31     Organ dysfunction indicated by Encephalopathy    Fluid challenge Ideal Body Weight- The patient's ideal body weight is Ideal body weight: 70.7 kg (155 lb 13.8 oz) which will be used to calculate fluid bolus of 30 ml/kg for treatment of septic shock.      Post- resuscitation assessment Yes  Perfusion exam was performed within 6 hours of septic shock presentation after bolus shows Adequate tissue perfusion assessed by non-invasive monitoring         Source control achieved by: neurosurgery consulted pending evaluation and will likely need IR drainage and biopsy/cultures.     Full H&P is to follow by night resident.     Boston Lovett DO  Staff Physician, Hospital Medicine.

## 2023-12-11 NOTE — ED NOTES
Pt identifiers Bj Liuansley Gutierrez Were checked and are correct  LOC: The patient is awake, alert, aware of environment with an appropriate affect. Oriented to person , will answer questions sporadically Rivera catheter to  bag patent draining madison colored urine   APPEARANCE: Pt resting comfortably, in no acute distress, pt is clean and well groomed, clothing properly fastened  SKIN: Skin warm, dry and intact, normal skin turgor, moist mucus membranes  RESPIRATORY: Airway is open and patent, respirations are spontaneous, even and unlabored, normal effort and rate  CARDIAC: Radial pulses strong and irregular  Tachycardia noted with heart rate 124 bpm, no peripheral edema noted, capillary refill < 3 seconds, bilateral radial pulses 2+  Pt is on a cardiac monitor and pulse oximetry   ABDOMEN: Soft, nontender, nondistended. Bowel sounds present to all four quad of abd on auscultaiton  NEUROLOGIC: PERRL, facial expression is symmetrical, patient moving all extremities spontaneously, normal sensation in all extremities when touched with a finger.  Follows all commands appropriately  MUSCULOSKELETAL:Limited motion right hand

## 2023-12-11 NOTE — CARE UPDATE
Brief Care Update:    73 y.o. male with a PMHx of CVA hx, residual R sided deficits, presented for worsening mental status over the weekend found to have severe sepsis (fever, tachycardia, leukocytosis, lactic acidosis) with lumbar L2-3 discitis/osteomyelitis and left psoas abscess.     Patient in EDOU, pending placement to stepdown unit due to increasing concern for his clinical status. Patient febrile to 101.1 this afternoon, treated with tylenol.   NSGY consulted and recommending addition cervical and thoracic spine MRI. MRI head also ordered for new confusion in patient with history of CVA.   Patient will need biopsy and drainage of psoas collection. IR consulted and patient will need MAC for procedure due to location. Tentatively planning for 12/12 or 12/13. Ongoing discussion with NSGY and Interventional Neurology for possible disc biopsy.   Patient started on maintenance IVFs due to poor appetite and minimal oral intake.   ID consulted. He has been placed on vancomycin and cefepime while awaiting further work-up.

## 2023-12-11 NOTE — CONSULTS
"Percutaneous Drain Placement/Aspiration Consult Note  Interventional Radiology    Consult Requested By: Jonathan Paredes DO  Reason for Consult: Psoas Abcess aspiration vs drain    SUBJECTIVE:     Chief Complaint:  back pain, sepsis    History of Present Illness:  Bj Pate Jr. is a 73 y.o. male with a PMHx of CVA hx, residual R sided deficits,HILARIO on CPAP, COPD, CKD, DM who presented to the ED with worsening lower abdominal pain and back pain x 2 weeks and confusion x 1 day on 12/11/23, was found to have severe sepsis (fever, tachycardia, leukocytosis, lactic acidosis) with lumbar L2-3 discitis/osteomyelitis and left psoas abscess. Interventional Radiology has been consulted for image guided percutaneous drain placement/aspiration for management of L sided psoas abscess. Pt has had recent imaging including a  MRI L spine  on 12/11/23 which revealed "ill-defined and poorly evaluated collection in the left psoas muscle at the level of L2-L3 measuring roughly 2.2 x 1.8 x 1.8 cm (series 13, image 18 and series 10, image 20). This finding is most consistent with an associated abscess or phlegmon". NSGY also consulted for eval of lumbar osteomyelitis. The pt's WBC is 11.86 and is trending down. Pt is tachycardic (HR 110s-120s) but BP WNL. Tmax 102 F. Blood cultures pending. He takes ASA at home, no other blood thinners. He has been NPO since 6 am.     Scheduled Meds:   atorvastatin  40 mg Oral Daily    ceFEPime (MAXIPIME) IVPB  2 g Intravenous Q8H    fluticasone furoate-vilanteroL  2 puff Inhalation Daily    gabapentin  600 mg Oral TID    vancomycin (VANCOCIN) IV (PEDS and ADULTS)  2,000 mg Intravenous Q24H     Continuous Infusions:  PRN Meds:acetaminophen, albuterol, aluminum-magnesium hydroxide-simethicone, dextrose 10%, dextrose 10%, glucagon (human recombinant), glucose, glucose, HYDROcodone-acetaminophen, insulin aspart U-100, melatonin, naloxone, ondansetron, sodium chloride 0.9%, Pharmacy to dose Vancomycin " consult **AND** vancomycin - pharmacy to dose    Review of patient's allergies indicates:   Allergen Reactions    Tomato (solanum lycopersicum) Hives    Naproxen Hives    Shrimp Other (See Comments)       Past Medical History:   Diagnosis Date    Anemia 06/04/2021    Anticoagulant long-term use     Basal ganglia hemorrhage     Left basal ganglia hemorrhage with resultant right-sided hemiparesis which has resolved.     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      Past Surgical History:   Procedure Laterality Date    CARPAL TUNNEL RELEASE Left 2/19/2020    Procedure: RELEASE, CARPAL TUNNEL LEFT;  Surgeon: Maria Luisa Mccurdy MD;  Location: [facility] OR;  Service: Orthopedics;  Laterality: Left;    COLONOSCOPY N/A 7/28/2023    Procedure: COLONOSCOPY;  Surgeon: Jaylene Alvarado MD;  Location: [facility];  Service: Endoscopy;  Laterality: N/A;    EPIDURAL STEROID INJECTION N/A 5/2/2019    Procedure: Injection, Steroid, Epidural Cervical;  Surgeon: Dariel Doan Jr., MD;  Location: [facility] ENDO;  Service: Pain Management;  Laterality: N/A;  Cervical Epidural Steroid Injection     [id number]    Arrive @ 1130; ASA; Check BG    EPIDURAL STEROID INJECTION Bilateral 5/29/2019    Procedure: Lumbar Medial Branch Blocks;  Surgeon: Dariel Doan Jr., MD;  Location: [facility] ENDO;  Service: Pain Management;  Laterality: Bilateral;  Bilateral Lumbar Medial Branch Blocks L3, L4, L5    [id number]  [id number]    Attive @  1030 (11 arrival request); NO Sedation; ASA; Check BG    EPIDURAL STEROID INJECTION Bilateral 7/3/2019    Procedure: Lumbar Medial Branch Blocks;  Surgeon: Dariel Doan Jr., MD;  Location: [facility] ENDO;  Service: Pain Management;  Laterality: Bilateral;  Bilateral Lumbar Medial Branch Blocks L3, L4, L5    [id number]  [id number]    Arrive @ 0930; NO Sedation; ASA; Check BG    EPIDURAL STEROID INJECTION N/A 8/7/2019    Procedure: Injection, Steroid, Epidural Cervical;  Surgeon: Dariel Doan Jr., MD;  Location: [facility] ENDO;  Service: Pain Management;  Laterality: N/A;  Cervical Epidural Steroid Injection C7-T1    [id number]    Arrive @ 1115; Last ASA 7/30; Check BG    ESOPHAGOGASTRODUODENOSCOPY N/A 7/28/2023    Procedure: EGD (ESOPHAGOGASTRODUODENOSCOPY);  Surgeon: Jaylene Alvarado MD;  Location: [facility] ENDO;  Service: Endoscopy;  Laterality: N/A;  inst via portal    LEFT HEART CATHETERIZATION Left 9/21/2021    Procedure: Left heart cath 9am start, R rad access;  Surgeon: Dariel Conner MD;  Location: [facility] CATH LAB;  Service: Cardiology;  Laterality: Left;  RN PRE OP Covid NEGATIVE ON  9-20-21.  C A    NO PAST SURGERIES       Family History   Problem Relation Age of Onset    No Known Problems Mother     No Known Problems Father     Hypertension Unknown     Diabetes Unknown     Diabetes Paternal Grandfather     Heart disease Paternal Grandfather     No Known Problems Sister     No Known Problems Brother     No Known Problems Maternal Aunt     No Known Problems Maternal Uncle     No Known Problems Paternal Aunt     No Known Problems Paternal Uncle     No Known Problems Maternal Grandmother     No Known Problems Maternal Grandfather     No Known Problems Paternal Grandmother     Amblyopia Neg Hx     Blindness Neg Hx     Cancer Neg Hx     Cataracts Neg Hx     Glaucoma Neg Hx     Macular degeneration Neg Hx     Retinal detachment Neg Hx     Strabismus Neg Hx     Stroke Neg Hx     Thyroid disease Neg Hx      Social History  "    Tobacco Use    Smoking status: Never    Smokeless tobacco: Never   Substance Use Topics    Alcohol use: Yes     Alcohol/week: 0.0 standard drinks of alcohol     Comment: occacionally    Drug use: No       OBJECTIVE:     Vital Signs (Most Recent)  Temp: 99.2 °F (37.3 °C) (12/11/23 0729)  Pulse: (!) 124 (12/11/23 0729)  Resp: 18 (12/11/23 0729)  BP: 133/79 (12/11/23 0729)  SpO2: 98 % (12/11/23 0729)    Physical Exam:  Physical Exam  Vitals and nursing note reviewed.   Constitutional:       General: He is not in acute distress.     Appearance: He is obese. He is ill-appearing.   HENT:      Head: Normocephalic and atraumatic.      Right Ear: External ear normal.      Left Ear: External ear normal.   Pulmonary:      Effort: Pulmonary effort is normal. No respiratory distress.   Abdominal:      General: There is distension.   Skin:     General: Skin is warm and dry.      Coloration: Skin is not jaundiced.   Neurological:      Mental Status: He is disoriented.         Laboratory  I have reviewed all pertinent lab results within the past 24 hours.  CBC:   Recent Labs   Lab 12/11/23 0722   WBC 11.86   RBC 3.62*   HGB 11.0*   HCT 33.5*      MCV 93   MCH 30.4   MCHC 32.8     BMP:   Recent Labs   Lab 12/11/23 0722   GLU 99      K 4.4      CO2 26   BUN 23   CREATININE 1.1   CALCIUM 10.0   MG 1.6     CMP:   Recent Labs   Lab 12/11/23 0722   GLU 99   CALCIUM 10.0   ALBUMIN 2.1*   PROT 7.4      K 4.4   CO2 26      BUN 23   CREATININE 1.1   ALKPHOS 78   ALT 30   AST 55*   BILITOT 1.3*     LFTs:   Recent Labs   Lab 12/11/23 0722   ALT 30   AST 55*   ALKPHOS 78   BILITOT 1.3*   PROT 7.4   ALBUMIN 2.1*     Coagulation: No results for input(s): "LABPROT", "INR", "APTT" in the last 168 hours.  Microbiology Results (last 7 days)       Procedure Component Value Units Date/Time    Blood culture x two cultures. Draw prior to antibiotics. [[id number]] Collected: 12/10/23 2007    Order Status: Completed " Specimen: Blood from Peripheral, Antecubital, Left Updated: 12/11/23 0315     Blood Culture, Routine No Growth to date    Narrative:      Aerobic and anaerobic    Blood culture x two cultures. Draw prior to antibiotics. [[id number]] Collected: 12/10/23 2007    Order Status: Completed Specimen: Blood from Peripheral, Hand, Left Updated: 12/11/23 0315     Blood Culture, Routine No Growth to date    Narrative:      Aerobic and anaerobic            ASA/Mallampati  ASA: 2  Mallampati: 2    Imaging:  Recent imaging studies including  MRI L spine  on 12/11/23 which was independently reviewed by Odell Pride MD.     EXAMINATION:  MRI LUMBAR SPINE W WO CONTRAST     CLINICAL HISTORY:  Low back pain, progressive neurologic deficit;     TECHNIQUE:  Multiplanar, multisequence MR imaging of the lumbar spine was obtained with and without 10 mL Gadavist IV contrast.     COMPARISON:  CT, 12/10/2023.     FINDINGS:  Exam quality is significantly limited by motion.     Reversal of the normal lumbar lordosis.  Minimal retrolisthesis of L2 with respect to L3.  Minimal anterolisthesis of L4 with respect to L5.  Otherwise, grossly normal sagittal alignment.     Extensive inflammatory changes and edema in the paraspinal muscles and psoas muscles throughout the majority of the lumbar spine.  Multifocal bilateral facet edema and facet degenerative changes.     Widening of the disc space at L2-L3 with increased STIR signal and enhancement.  Associated edema signal and enhancement involving the inferior endplate of L2 and superior endplate of L3.  There is also subtle increased edema and enhancement involving the L3-L4 disc space.  Findings are suggestive of discitis osteomyelitis.     There is an ill-defined and poorly evaluated collection in the left psoas muscle at the level of L2-L3 measuring roughly 2.2 x 1.8 x 1.8 cm (series 13, image 18 and series 10, image 20).  This finding is most consistent with an associated abscess or  phlegmon.     Evaluation of the central canal and cauda quinine a significantly limited by motion and image noise.  There is mild enhancement of the thecal sac raising the question of small epidural abscess, though this finding is not definitive as evaluation is significantly limited by motion.     L1-2: Severe disc space height loss.  Mild-to-moderate bilateral neural foraminal narrowing.  No severe central canal stenosis.     L2-3: Widening of the intervertebral disc space with increased disc space signal and enhancement consistent with discitis/osteomyelitis.  At least moderate bilateral neural foraminal narrowing and mild central canal stenosis.     L3-4: Significant disc space height loss.  Abnormal signal and enhancement involving the L3-L4 disc space.  Broad-based posterior disc bulge extending into the central canal and bilateral lateral recesses.  At least moderate bilateral neural foraminal narrowing and moderate central canal stenosis.     L4-5: Mild disc space height loss and disc desiccation.  Broad-based posterior disc bulge and ligamentum flavum hypertrophy contributes to severe central canal stenosis and moderate bilateral neural foraminal narrowing.     L5-S1:  Mild disc space height loss and disc desiccation.  Small posterior disc bulge.  Mild right greater than left neural foraminal narrowing.  No significant central canal stenosis.     Impression:     Findings suggestive of discitis/osteomyelitis at L2-L3 with left psoas abscess measuring up to 2.2 cm in size.     Probable early discitis/osteomyelitis at L3-L4.     Motion limited study.  Thin epidural abscess is difficult to exclude.     Advanced multilevel degenerative changes throughout the lumbar spine with severe central canal stenosis and significant bilateral neural foraminal narrowing at L4-L5.     Multilevel facet arthropathy and facet edema, which could be exaggerated by aforementioned inflammatory changes in the paraspinal soft  tissues.     Additional findings discussed in the body of the report.     This report was flagged in Epic as abnormal.        Electronically signed by: Dayne Marquez MD  Date:                                            12/11/2023  Time:                                           03:18    ASSESSMENT/PLAN:     Assessment:  73 y.o. male with a PMHx of CVA hx, residual R sided deficits,HILARIO on CPAP, COPD, CKD, DM who has been referred to IR for image guided percutaneous drain placement/aspiration for management of  L psoas abscess . Case discussed with staff. Pt has a very small hypodense lesion in the L psoas visualized on MRI which may be abscess vs phlegmon. It is difficult to see on CT. Can attempt aspiration of this L psoas abscess, but there is a high chance aspiration will be low yield and we will not receive determinate culture results. The procedure was discussed in great detail with the patient including thorough explanations of the potential risks and benefits of percutaneous drain placement/aspiration. Risks include sepsis, severe infection, hemorrhage, damage to surrounding structures, catheter malfunction, inability to remove catheter, and catheter dislodgment. The patient is a candidate for CT guided percutaneous drain placement/aspiration under MAC sedation. Plan discussed with ordering physician.The pt verbalized understanding of the plan and would like to proceed.    Plan:  Will proceed with CT guided percutaneous drain placement/aspiration under MAC sedation on 12/12/23 or 12/13/23.   Please keep pt NPO starting at midnight on 12/12/23.   Primary team to order any labs/cultures to be drawn on fluid sample collected during the procedure.  Anticoagulation history reviewed.   Coagulation labs reviewed.  Thank you for the consult. Please contact with questions via [employer] secure chat or [employer] Link    Bette Abbott PA-C  Interventional Radiology  Spectra: [phone]

## 2023-12-11 NOTE — PHARMACY MED REC
"Admission Medication History     The home medication history was taken by Charley Mitchell.    You may go to "Admission" then "Reconcile Home Medications" tabs to review and/or act upon these items.     The home medication list has been updated by the Pharmacy department.   Please read ALL comments highlighted in yellow.   Please address this information as you see fit.    Feel free to contact us if you have any questions or require assistance.      The medications listed below were removed from the home medication list. Please reorder if appropriate:  Patient reports no longer taking the following medication(s):  ALBUTEROL 90 MCG/ACT INHALER  CHLORHEXIDINE 0.12 % SOL  COLCHICINE 0.6 MG  DICYCLOMINE 20 MG  DIPHENOXYLATE-ATROPINE TAB  ONDANSETRON ODT 4 MG  POLYETHYLENE GLYCOL (GOLYTELY) SUSP  VITAMIN D3 1000 UNIT    Medications listed below were obtained from: Christophii - daughter  Current Outpatient Medications on File Prior to Encounter   Medication Sig    acetaminophen (TYLENOL) 650 MG TbSR Take 1,300 mg by mouth every 6 (six) hours as needed (Pain).    ascorbic acid, vitamin C, (VITAMIN C) 500 MG tablet Take 500 mg by mouth once daily.    atorvastatin (LIPITOR) 40 MG tablet Take 1 tablet (40 mg total) by mouth once daily.    cyclobenzaprine (FLEXERIL) 10 MG tablet Take 10 mg by mouth 3 (three) times daily as needed for Muscle spasms.    docusate sodium (COLACE) 100 MG capsule Take 100 mg by mouth daily as needed for Constipation.    furosemide (LASIX) 20 MG tablet Take 1 tablet (20 mg total) by mouth 2 (two) times daily.    gabapentin (NEURONTIN) 600 MG tablet TAKE 1 TABLET(600 MG) BY MOUTH THREE TIMES DAILY AS NEEDED    HYDROcodone-acetaminophen (NORCO) 5-325 mg per tablet Take 1 tablet by mouth every 6 (six) hours as needed for Pain.    PREVIDENT 5000 SENSITIVE 1.1-5 % Pste Brush teeth every other day as directed.    tamsulosin (FLOMAX) 0.4 mg Cap Take 0.4 mg by mouth once daily.    blood pressure test kit-large Kit 1 " Device by Misc.(Non-Drug; Combo Route) route 2 (two) times daily.    blood sugar diagnostic Strp To check BG 2 times daily, to use with insurance preferred meter    blood-glucose meter kit To check BG 2 times daily, to use with insurance preferred meter    fluticasone-salmeterol diskus inhaler 500-50 mcg Inhale 1 puff into the lungs 2 (two) times daily. Controller. Unknown    lancets Misc To check BG 2 times daily, to use with insurance preferred meter    PFIZER COVID BIVAL,12Y UP,,PF, 30 mcg/0.3 mL injection      Potential issues to be addressed PRIOR TO DISCHARGE  Patient reported not taking the following medications: (ASPIRIN, CARDIZEM CD). These medications remain on the home medication list. Please address accordingly.             Charley Mitchell  EXT [phone]                  .

## 2023-12-11 NOTE — ASSESSMENT & PLAN NOTE
Mr. Pate is a 73M w/ hx CVA, CKD3, HTN, DM2, COPD who presents with increasing lethargy and confusion. Per ED team, patient had 3 weeks of low back pain, with a fall yesterday leading to his presentation. He had subjective lower extremity weakness and urinary retention after the fall. Septic on presentation to ED. Too altered to meaningfully participate in spine motor exam at time of NSGY evaluation. MRI findings in conjunction with septic presentation concerning for osteo.     Imaging:  [facility] 12/11: lacuanr infarcts   MRI w/wo L sp 12/11: severe central stenosis L4-5, discitis osteomyelitis at L2-3, L psoas abscess up to 2 cm     12/10: , CRP >120     - agree with admission to medicine   - MRI w/wo C and T spine to complete osteo workup  - document PVR  - Infectious workup per    - f/u BCx  - recommend IR consult for potential psoas abscess drainage and culture  - continue broad spec abx per   - no indication for emergent neurosurgical intervention, nsgy will continue to follow    Discussed with Dr. Michele

## 2023-12-11 NOTE — ED NOTES
Bj Liuansley Gutierrez, a 73 y.o. male presents to the ED w/ complaint of Fever   T 102, HR in 130s, pain in back and pain with urination, odorous urine, increased confusion, Hx of stroke right sided deficits    Triage note:  Chief Complaint   Patient presents with    Fever     Temp 102, HR in 130s, +pain with urination, odorous urine. +increased confusion, GCS of 14 at baseline. Hx of stroke.      Review of patient's allergies indicates:   Allergen Reactions    Tomato (solanum lycopersicum) Hives    Naproxen Hives    Shrimp Other (See Comments)     Past Medical History:   Diagnosis Date    Anemia 06/04/2021    Anticoagulant long-term use     Basal ganglia hemorrhage     Left basal ganglia hemorrhage with resultant right-sided hemiparesis which has resolved.     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

## 2023-12-11 NOTE — SUBJECTIVE & OBJECTIVE
(Not in a hospital admission)      Review of patient's allergies indicates:   Allergen Reactions    Tomato (solanum lycopersicum) Hives    Naproxen Hives    Shrimp Other (See Comments)       Past Medical History:   Diagnosis Date    Anemia 06/04/2021    Anticoagulant long-term use     Basal ganglia hemorrhage     Left basal ganglia hemorrhage with resultant right-sided hemiparesis which has resolved.     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      Past Surgical History:   Procedure Laterality Date    CARPAL TUNNEL RELEASE Left 2/19/2020    Procedure: RELEASE, CARPAL TUNNEL LEFT;  Surgeon: Maria Luisa Mccurdy MD;  Location: [facility] OR;  Service: Orthopedics;  Laterality: Left;    COLONOSCOPY N/A 7/28/2023    Procedure: COLONOSCOPY;  Surgeon: Jaylene Alvarado MD;  Location: [facility];  Service: Endoscopy;  Laterality: N/A;    EPIDURAL STEROID INJECTION N/A 5/2/2019    Procedure: Injection, Steroid, Epidural Cervical;  Surgeon: Dariel Doan Jr., MD;  Location: [facility] ENDO;  Service: Pain Management;  Laterality: N/A;  Cervical Epidural Steroid Injection     [id number]    Arrive @ 1130; ASA; Check BG    EPIDURAL STEROID INJECTION Bilateral 5/29/2019    Procedure: Lumbar Medial Branch Blocks;  Surgeon: Dariel Doan Jr., MD;  Location: [facility] ENDO;  Service: Pain Management;  Laterality: Bilateral;  Bilateral Lumbar Medial Branch Blocks L3, L4, L5    [id number]  [id number]    Attive @ 1030 (11 arrival  request); NO Sedation; ASA; Check BG    EPIDURAL STEROID INJECTION Bilateral 7/3/2019    Procedure: Lumbar Medial Branch Blocks;  Surgeon: Dariel Doan Jr., MD;  Location: [facility] ENDO;  Service: Pain Management;  Laterality: Bilateral;  Bilateral Lumbar Medial Branch Blocks L3, L4, L5    [id number]  [id number]    Arrive @ 0930; NO Sedation; ASA; Check BG    EPIDURAL STEROID INJECTION N/A 8/7/2019    Procedure: Injection, Steroid, Epidural Cervical;  Surgeon: Dariel Doan Jr., MD;  Location: [facility] ENDO;  Service: Pain Management;  Laterality: N/A;  Cervical Epidural Steroid Injection C7-T1    [id number]    Arrive @ 1115; Last ASA 7/30; Check BG    ESOPHAGOGASTRODUODENOSCOPY N/A 7/28/2023    Procedure: EGD (ESOPHAGOGASTRODUODENOSCOPY);  Surgeon: Jaylene Alvarado MD;  Location: [facility] ENDO;  Service: Endoscopy;  Laterality: N/A;  inst via portal    LEFT HEART CATHETERIZATION Left 9/21/2021    Procedure: Left heart cath 9am start, R rad access;  Surgeon: Dariel Conner MD;  Location: [facility] CATH LAB;  Service: Cardiology;  Laterality: Left;  RN PRE OP Covid NEGATIVE ON  9-20-21.  C A    NO PAST SURGERIES       Family History       Problem Relation (Age of Onset)    Diabetes Paternal Grandfather    Heart disease Paternal Grandfather    Hypertension     No Known Problems Mother, Father, Sister, Brother, Maternal Aunt, Maternal Uncle, Paternal Aunt, Paternal Uncle, Maternal Grandmother, Maternal Grandfather, Paternal Grandmother          Tobacco Use    Smoking status: Never    Smokeless tobacco: Never   Substance and Sexual Activity    Alcohol use: Yes     Alcohol/week: 0.0 standard drinks of alcohol     Comment: occacionally    Drug use: No    Sexual activity: Not Currently     Review of Systems   Unable to perform ROS: Mental status change     Objective:     Weight: (!) 148.8 kg (328 lb)  Body mass index is 48.44 kg/m².  Vital Signs (Most Recent):  Temp: 99.2 °F (37.3 °C) (12/11/23 0729)  Pulse: (!) 124 (12/11/23  "0729)  Resp: 18 (12/11/23 0729)  BP: 133/79 (12/11/23 0729)  SpO2: 98 % (12/11/23 0729) Vital Signs (24h Range):  Temp:  [98.6 °F (37 °C)-102 °F (38.9 °C)] 99.2 °F (37.3 °C)  Pulse:  [] 124  Resp:  [18-26] 18  SpO2:  [95 %-100 %] 98 %  BP: (120-149)/(66-86) 133/79     Date 12/11/23 0700 - 12/12/23 0659   Shift 3912-5218 5336-3140 0943-0595 24 Hour Total   INTAKE   IV Piggyback 500   500   Shift Total(mL/kg) 500(3.4)   500(3.4)   OUTPUT   Shift Total(mL/kg)       Weight (kg) 148.8 148.8 148.8 148.8                            Urethral Catheter 12/11/23 0100 Silicone 16 Fr. (Active)   Output (mL) 800 mL 12/11/23 0357          Physical Exam         Neurosurgery Physical Exam    E3V3M6  Aox1  BUE int FC full strength  BLE FC distally, not compliant with exam proximally    Significant Labs:  Recent Labs   Lab 12/10/23  2006 12/11/23  0722   * 99    138   K 4.3 4.4    102   CO2 26 26   BUN 27* 23   CREATININE 1.3 1.1   CALCIUM 10.4 10.0   MG 1.5* 1.6     Recent Labs   Lab 12/10/23  2006 12/11/23  0722   WBC 12.74* 11.86   HGB 11.0* 11.0*   HCT 33.5* 33.5*    264     No results for input(s): "LABPT", "INR", "APTT" in the last 48 hours.  Microbiology Results (last 7 days)       Procedure Component Value Units Date/Time    Blood culture x two cultures. Draw prior to antibiotics. [[id number]] Collected: 12/10/23 2007    Order Status: Completed Specimen: Blood from Peripheral, Antecubital, Left Updated: 12/11/23 0315     Blood Culture, Routine No Growth to date    Narrative:      Aerobic and anaerobic    Blood culture x two cultures. Draw prior to antibiotics. [[id number]] Collected: 12/10/23 2007    Order Status: Completed Specimen: Blood from Peripheral, Hand, Left Updated: 12/11/23 0315     Blood Culture, Routine No Growth to date    Narrative:      Aerobic and anaerobic          All pertinent labs from the last 24 hours have been reviewed.    Significant Diagnostics:  MRI w/wo L Sp: "   Impression:     Findings suggestive of discitis/osteomyelitis at L2-L3 with left psoas abscess measuring up to 2.2 cm in size.     Probable early discitis/osteomyelitis at L3-L4.     Motion limited study.  Thin epidural abscess is difficult to exclude.     Advanced multilevel degenerative changes throughout the lumbar spine with severe central canal stenosis and significant bilateral neural foraminal narrowing at L4-L5.     Multilevel facet arthropathy and facet edema, which could be exaggerated by aforementioned inflammatory changes in the paraspinal soft tissues.    CT: CT Head Without Contrast    Result Date: 12/10/2023  Chronic change noted, there is no evidence for superimposed acute intracranial process. Electronically signed by: Marcial De La Rosa Date:    12/10/2023 Time:    21:42   MRI: No results found in the last 24 hours.

## 2023-12-11 NOTE — HPI
This is a 73M with a h/o CVA  about 8 to 10 years ago with R sided deficits, DM, HTN who was BIB from home with family with 3 weeks of abdominal pain. Seen at multiple EDS, and generally unremarkable work up as per family. Within last few days started to have progressive worsening back pain. Became acute altered 2 days ago w/, LE weakness now requiring assistance to walk, as well as urinary incontinence. Patient is A/O 0x at admit. Accompanied by Daughter, most of history from her.     Admission to ED patient was febrile at 102 F, tachycardic 130, tachypneic at 23, hypertensive at 140/72 initially satting well on room air later requiring 2 L nasal cannula.  WBCs elevated 12.74 anemia 11.0 platelets normal, sed rate 120+, BUN 27 magnesium 1.5, albumin 2.4, .1, troponin 0.126 urine trace proteinuria trace ketones lactate initially elevated 2.6 later 1.3 CT head unremarkable for acute processes CT abdomen and pelvis and MRI lumbar spine shows left psoas abscess L2-L3 osteomyleitis, and diskitis.  Neurosurgery was consulted recommendations are pending.  Received Cefepime and Vanc   transfer training

## 2023-12-11 NOTE — PROVIDER PROGRESS NOTES - EMERGENCY DEPT.
Encounter Date: 12/10/2023    ED Physician Progress Notes          ED staff SIGN OUT NOTE    Patient s/o to me by Dr. Kay pending MRI, admission     MRI with evidence of lumbar osteomyelitis, discitis, psoas abscess    Discussed with NSGY who will eval patient in the ED and provide recommendations

## 2023-12-11 NOTE — HPI
Mr. Pate is a 73M w/ hx CVA, CKD3, HTN, DM2, COPD who presents with increasing lethargy and confusion. Per ED team, patient had 3 weeks of low back pain, with a fall yesterday leading to his presentation. He had subjective lower extremity weakness and urinary retention after the fall. Septic on presentation to ED. Too altered to meaningfully participate in spine motor exam at time of NSGY evaluation.

## 2023-12-11 NOTE — CONSULTS
[facility] - Emergency Dept  Neurosurgery  Consult Note    Inpatient consult to Neurosurgery  Consult performed by: Светлана Koroma MD  Consult ordered by: Rossi Mohan MD        Subjective:     Chief Complaint/Reason for Admission: osteomyelitis    History of Present Illness: Mr. Pate is a 73M w/ hx CVA, CKD3, HTN, DM2, COPD who presents with increasing lethargy and confusion. Per ED team, patient had 3 weeks of low back pain, with a fall yesterday leading to his presentation. He had subjective lower extremity weakness and urinary retention after the fall. Septic on presentation to ED. Too altered to meaningfully participate in spine motor exam at time of NSGY evaluation.     (Not in a hospital admission)      Review of patient's allergies indicates:   Allergen Reactions    Tomato (solanum lycopersicum) Hives    Naproxen Hives    Shrimp Other (See Comments)       Past Medical History:   Diagnosis Date    Anemia 06/04/2021    Anticoagulant long-term use     Basal ganglia hemorrhage     Left basal ganglia hemorrhage with resultant right-sided hemiparesis which has resolved.     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      Past Surgical History:   Procedure Laterality Date    CARPAL TUNNEL RELEASE Left 2/19/2020    Procedure: RELEASE, CARPAL TUNNEL LEFT;  Surgeon: Maria Luisa Mccurdy MD;  [facility];  Service: Orthopedics;  Laterality: Left;    COLONOSCOPY N/A 7/28/2023    Procedure:  COLONOSCOPY;  Surgeon: Jaylene Alvarado MD;  Location: [facility] ENDO;  Service: Endoscopy;  Laterality: N/A;    EPIDURAL STEROID INJECTION N/A 5/2/2019    Procedure: Injection, Steroid, Epidural Cervical;  Surgeon: Dariel Doan Jr., MD;  Location: [facility] ENDO;  Service: Pain Management;  Laterality: N/A;  Cervical Epidural Steroid Injection     [id number]    Arrive @ 1130; ASA; Check BG    EPIDURAL STEROID INJECTION Bilateral 5/29/2019    Procedure: Lumbar Medial Branch Blocks;  Surgeon: Dariel Doan Jr., MD;  Location: [facility] ENDO;  Service: Pain Management;  Laterality: Bilateral;  Bilateral Lumbar Medial Branch Blocks L3, L4, L5    85904  [id number]    Attive @ 1030 (11 arrival request); NO Sedation; ASA; Check BG    EPIDURAL STEROID INJECTION Bilateral 7/3/2019    Procedure: Lumbar Medial Branch Blocks;  Surgeon: Dariel Doan Jr., MD;  Location: [facility] ENDO;  Service: Pain Management;  Laterality: Bilateral;  Bilateral Lumbar Medial Branch Blocks L3, L4, L5    00446  38596    Arrive @ 0930; NO Sedation; ASA; Check BG    EPIDURAL STEROID INJECTION N/A 8/7/2019    Procedure: Injection, Steroid, Epidural Cervical;  Surgeon: Dariel Doan Jr., MD;  Location: [facility] ENDO;  Service: Pain Management;  Laterality: N/A;  Cervical Epidural Steroid Injection C7-T1    [id number]    Arrive @ 1115; Last ASA 7/30; Check BG    ESOPHAGOGASTRODUODENOSCOPY N/A 7/28/2023    Procedure: EGD (ESOPHAGOGASTRODUODENOSCOPY);  Surgeon: Jaylene Alvarado MD;  Location: [facility] ENDO;  Service: Endoscopy;  Laterality: N/A;  inst via portal    LEFT HEART CATHETERIZATION Left 9/21/2021    Procedure: Left heart cath 9am start, R rad access;  Surgeon: Dariel Conner MD;  Location: [facility] CATH LAB;  Service: Cardiology;  Laterality: Left;  RN PRE OP Covid NEGATIVE ON  9-20-21.  C A    NO PAST SURGERIES       Family History       Problem Relation (Age of Onset)    Diabetes Paternal Grandfather    Heart disease Paternal Grandfather    Hypertension   "   No Known Problems Mother, Father, Sister, Brother, Maternal Aunt, Maternal Uncle, Paternal Aunt, Paternal Uncle, Maternal Grandmother, Maternal Grandfather, Paternal Grandmother          Tobacco Use    Smoking status: Never    Smokeless tobacco: Never   Substance and Sexual Activity    Alcohol use: Yes     Alcohol/week: 0.0 standard drinks of alcohol     Comment: occacionally    Drug use: No    Sexual activity: Not Currently     Review of Systems   Unable to perform ROS: Mental status change     Objective:     Weight: (!) 148.8 kg (328 lb)  Body mass index is 48.44 kg/m².  Vital Signs (Most Recent):  Temp: 99.2 °F (37.3 °C) (12/11/23 0729)  Pulse: (!) 124 (12/11/23 0729)  Resp: 18 (12/11/23 0729)  BP: 133/79 (12/11/23 0729)  SpO2: 98 % (12/11/23 0729) Vital Signs (24h Range):  Temp:  [98.6 °F (37 °C)-102 °F (38.9 °C)] 99.2 °F (37.3 °C)  Pulse:  [] 124  Resp:  [18-26] 18  SpO2:  [95 %-100 %] 98 %  BP: (120-149)/(66-86) 133/79     Date 12/11/23 0700 - 12/12/23 0659   Shift 8390-4601 9416-4280 9513-1588 24 Hour Total   INTAKE   IV Piggyback 500   500   Shift Total(mL/kg) 500(3.4)   500(3.4)   OUTPUT   Shift Total(mL/kg)       Weight (kg) 148.8 148.8 148.8 148.8                            Urethral Catheter 12/11/23 0100 Silicone 16 Fr. (Active)   Output (mL) 800 mL 12/11/23 0357          Physical Exam         Neurosurgery Physical Exam    E3V3M6  Aox1  BUE int FC full strength  BLE FC distally, not compliant with exam proximally    Significant Labs:  Recent Labs   Lab 12/10/23  2006 12/11/23  0722   * 99    138   K 4.3 4.4    102   CO2 26 26   BUN 27* 23   CREATININE 1.3 1.1   CALCIUM 10.4 10.0   MG 1.5* 1.6     Recent Labs   Lab 12/10/23  2006 12/11/23  0722   WBC 12.74* 11.86   HGB 11.0* 11.0*   HCT 33.5* 33.5*    264     No results for input(s): "LABPT", "INR", "APTT" in the last 48 hours.  Microbiology Results (last 7 days)       Procedure Component Value Units Date/Time    Blood " culture x two cultures. Draw prior to antibiotics. [[id number]] Collected: 12/10/23 2007    Order Status: Completed Specimen: Blood from Peripheral, Antecubital, Left Updated: 12/11/23 0315     Blood Culture, Routine No Growth to date    Narrative:      Aerobic and anaerobic    Blood culture x two cultures. Draw prior to antibiotics. [[id number]] Collected: 12/10/23 2007    Order Status: Completed Specimen: Blood from Peripheral, Hand, Left Updated: 12/11/23 0315     Blood Culture, Routine No Growth to date    Narrative:      Aerobic and anaerobic          All pertinent labs from the last 24 hours have been reviewed.    Significant Diagnostics:  MRI w/wo L Sp:   Impression:     Findings suggestive of discitis/osteomyelitis at L2-L3 with left psoas abscess measuring up to 2.2 cm in size.     Probable early discitis/osteomyelitis at L3-L4.     Motion limited study.  Thin epidural abscess is difficult to exclude.     Advanced multilevel degenerative changes throughout the lumbar spine with severe central canal stenosis and significant bilateral neural foraminal narrowing at L4-L5.     Multilevel facet arthropathy and facet edema, which could be exaggerated by aforementioned inflammatory changes in the paraspinal soft tissues.    CT: CT Head Without Contrast    Result Date: 12/10/2023  Chronic change noted, there is no evidence for superimposed acute intracranial process. Electronically signed by: Marcial De La Rosa Date:    12/10/2023 Time:    21:42   MRI: No results found in the last 24 hours.  Assessment/Plan:     Acute osteomyelitis of lumbar spine  Mr. Pate is a 73M w/ hx CVA, CKD3, HTN, DM2, COPD who presents with increasing lethargy and confusion. Per ED team, patient had 3 weeks of low back pain, with a fall yesterday leading to his presentation. He had subjective lower extremity weakness and urinary retention after the fall. Septic on presentation to ED. Too altered to meaningfully participate in spine motor exam  at time of NSGY evaluation. MRI findings in conjunction with septic presentation concerning for osteo.     Imaging:  CTH 12/11: lacuanr infarcts   MRI w/wo L sp 12/11: severe central stenosis L4-5, discitis osteomyelitis at L2-3, L psoas abscess up to 2 cm     12/10: , CRP >120     - agree with admission to medicine   - MRI w/wo C and T spine to complete osteo workup  - document PVR  - Infectious workup per    - f/u BCx  - recommend IR consult for potential psoas abscess drainage and culture  - continue broad spec abx per   - no indication for emergent neurosurgical intervention, nsgy will continue to follow    Discussed with Dr. Michele        Thank you for your consult. I will follow-up with patient. Please contact us if you have any additional questions.    Светлана Koroma MD  Neurosurgery  [facility] - Emergency Dept

## 2023-12-11 NOTE — CLINICAL REVIEW
IP Sepsis Screen (most recent)       Sepsis Screen (IP) - 12/11/23 0841       Is the patient's history or complaint suggestive of a possible infection? Yes  -    Are there at least two of the following signs and symptoms present? Yes   TMax 102-recieved Tylenol -    Sepsis signs/symptoms - Hyper or Hypothermia Hyperthermia >100.4 or Hypothermia < 96.8  -    Sepsis signs/symptoms - Tachycardia Tachycardia     >90  -    Sepsis signs/symptoms - Altered Mental Status Altered Mental Status  -    Are any of the following organ dysfunction criteria present and not considered to be due to a chronic condition? Yes  -    Organ Dysfunction Criteria - O2 O2 Saturation < 95% on room air  -    Initiate Sepsis Protocol No  -    Reason sepsis not considered Pt. receiving appropriate management  -              User Key  (r) = Recorded By, (t) = Taken By, (c) = Cosigned By      Initials Name    Chrissy Malohtra RN

## 2023-12-11 NOTE — ED NOTES
Assumed care of patient from BAILEE Gomez Jr., a 73 y.o. male     Triage note:  Chief Complaint   Patient presents with    Fever     Temp 102, HR in 130s, +pain with urination, odorous urine. +increased confusion, GCS of 14 at baseline. Hx of stroke.      Review of patient's allergies indicates:   Allergen Reactions    Tomato (solanum lycopersicum) Hives    Naproxen Hives    Shrimp Other (See Comments)     Past Medical History:   Diagnosis Date    Anemia 06/04/2021    Anticoagulant long-term use     Basal ganglia hemorrhage     Left basal ganglia hemorrhage with resultant right-sided hemiparesis which has resolved.     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      LOC: Patient name and date of birth verified. The patient is awake, alert and aware of environment with an appropriate affect, the patient is oriented x 3 and speaking appropriately.   APPEARANCE: Patient resting comfortably, patient is clean and well groomed, patient's clothing is properly fastened.  SKIN: The skin is warm and dry, color consistent with ethnicity, patient has normal skin turgor and moist mucus membranes, skin intact, no breakdown or bruising noted.  MUSCULOSKELETAL: Patient has hemiparesis of the right side from past CVA  RESPIRATORY: Respirations are spontaneous, patient has a normal effort and rate, no accessory muscle use noted.  CARDIAC: Patient has an elevate heart rate  ABDOMEN: Soft and non  tender to palpation,   NEUROLOGIC: Eyes open spontaneously, behavior appropriate to situation, follows commands

## 2023-12-11 NOTE — SUBJECTIVE & OBJECTIVE
Past Medical History:   Diagnosis Date    Anemia 06/04/2021    Anticoagulant long-term use     Basal ganglia hemorrhage     Left basal ganglia hemorrhage with resultant right-sided hemiparesis which has resolved.     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        Past Surgical History:   Procedure Laterality Date    CARPAL TUNNEL RELEASE Left 2/19/2020    Procedure: RELEASE, CARPAL TUNNEL LEFT;  Surgeon: Maria Luisa Mccurdy MD;  Location: [facility] OR;  Service: Orthopedics;  Laterality: Left;    COLONOSCOPY N/A 7/28/2023    Procedure: COLONOSCOPY;  Surgeon: Jaylene Alvarado MD;  Location: [facility] ENDO;  Service: Endoscopy;  Laterality: N/A;    EPIDURAL STEROID INJECTION N/A 5/2/2019    Procedure: Injection, Steroid, Epidural Cervical;  Surgeon: Dariel Doan Jr., MD;  Location: [facility] ENDO;  Service: Pain Management;  Laterality: N/A;  Cervical Epidural Steroid Injection     71484    Arrive @ 1130; ASA; Check BG    EPIDURAL STEROID INJECTION Bilateral 5/29/2019    Procedure: Lumbar Medial Branch Blocks;  Surgeon: Dariel Doan Jr., MD;  Location: [facility] ENDO;  Service: Pain Management;  Laterality: Bilateral;  Bilateral Lumbar Medial Branch Blocks L3, L4, L5    [id number]  [id number]    Attive @ 1030 (11 arrival request); NO Sedation; ASA; Check BG    EPIDURAL STEROID INJECTION Bilateral 7/3/2019    Procedure: Lumbar Medial Branch Blocks;  Surgeon: Dariel Doan Jr., MD;  Location: [facility]  ENDO;  Service: Pain Management;  Laterality: Bilateral;  Bilateral Lumbar Medial Branch Blocks L3, L4, L5    [id number]  [id number]    Arrive @ 0930; NO Sedation; ASA; Check BG    EPIDURAL STEROID INJECTION N/A 8/7/2019    Procedure: Injection, Steroid, Epidural Cervical;  Surgeon: Dariel Doan Jr., MD;  Location: [facility] ENDO;  Service: Pain Management;  Laterality: N/A;  Cervical Epidural Steroid Injection C7-T1    [id number]    Arrive @ 1115; Last ASA 7/30; Check BG    ESOPHAGOGASTRODUODENOSCOPY N/A 7/28/2023    Procedure: EGD (ESOPHAGOGASTRODUODENOSCOPY);  Surgeon: Jaylene Alvarado MD;  Location: [facility] ENDO;  Service: Endoscopy;  Laterality: N/A;  inst via portal    LEFT HEART CATHETERIZATION Left 9/21/2021    Procedure: Left heart cath 9am start, R rad access;  Surgeon: Dariel Conner MD;  Location: [facility] CATH LAB;  Service: Cardiology;  Laterality: Left;  RN PRE OP Covid NEGATIVE ON  9-20-21.  C A    NO PAST SURGERIES         Review of patient's allergies indicates:   Allergen Reactions    Tomato (solanum lycopersicum) Hives    Naproxen Hives    Shrimp Other (See Comments)       No current facility-administered medications on file prior to encounter.     Current Outpatient Medications on File Prior to Encounter   Medication Sig    acetaminophen (TYLENOL) 325 MG tablet Take 2 tablets (650 mg total) by mouth every 6 (six) hours as needed for Pain or Temperature greater than (100.3).    albuterol (PROVENTIL/VENTOLIN HFA) 90 mcg/actuation inhaler Inhale 2 puffs into the lungs every 6 (six) hours as needed for Wheezing. Rescue    ascorbic acid, vitamin C, (VITAMIN C) 500 MG tablet Take 500 mg by mouth once daily.    aspirin (ECOTRIN) 81 MG EC tablet Take 81 mg by mouth once daily.    atorvastatin (LIPITOR) 40 MG tablet Take 1 tablet (40 mg total) by mouth once daily.    blood pressure test kit-large Kit 1 Device by Misc.(Non-Drug; Combo Route) route 2 (two) times daily.    blood sugar diagnostic Strp To check BG 2 times  daily, to use with insurance preferred meter    blood-glucose meter kit To check BG 2 times daily, to use with insurance preferred meter    chlorhexidine (PERIDEX) 0.12 % solution Use as directed 15 mLs in the mouth or throat 2 (two) times daily.    colchicine (COLCRYS) 0.6 mg tablet Take 1 tablet (0.6 mg total) by mouth daily as needed.    colchicine (MITIGARE) 0.6 mg Cap Take by mouth.    dicyclomine (BENTYL) 20 mg tablet TAKE 1 TABLET(20 MG) BY MOUTH THREE TIMES DAILY AS NEEDED FOR ABDOMINAL PAIN    diltiaZEM (CARDIZEM CD) 240 MG 24 hr capsule Take 1 capsule (240 mg total) by mouth once daily.    diphenoxylate-atropine 2.5-0.025 mg (LOMOTIL) 2.5-0.025 mg per tablet Take 1 tablet by mouth 4 (four) times daily as needed for Diarrhea. (Patient not taking: Reported on 11/2/2023)    fluticasone-salmeterol diskus inhaler 500-50 mcg Inhale 1 puff into the lungs 2 (two) times daily. Controller (Patient not taking: Reported on 12/7/2023)    furosemide (LASIX) 20 MG tablet Take 1 tablet (20 mg total) by mouth 2 (two) times daily.    gabapentin (NEURONTIN) 600 MG tablet TAKE 1 TABLET(600 MG) BY MOUTH THREE TIMES DAILY AS NEEDED    HYDROcodone-acetaminophen (NORCO) 5-325 mg per tablet Take 1 tablet by mouth every 6 (six) hours as needed for Pain. (Patient not taking: Reported on 12/7/2023)    lancets Misc To check BG 2 times daily, to use with insurance preferred meter    ondansetron (ZOFRAN-ODT) 4 MG TbDL Take 1 tablet (4 mg total) by mouth every 8 (eight) hours as needed (Nausea).    PFIZER COVID BIVAL,12Y UP,,PF, 30 mcg/0.3 mL injection     polyethylene glycol (GOLYTELY) 236-22.74-6.74 -5.86 gram suspension Take 4,000 mLs by mouth once.    PREVIDENT 5000 SENSITIVE 1.1-5 % Pste use as directed    tamsulosin (FLOMAX) 0.4 mg Cap     vitamin D (VITAMIN D3) 1000 units Tab Take 1,000 Units by mouth once daily.    [DISCONTINUED] nystatin (MYCOSTATIN) cream APPLY   TOPICALLY TO AFFECTED AREA TWICE DAILY     Family History        Problem Relation (Age of Onset)    Diabetes Paternal Grandfather    Heart disease Paternal Grandfather    Hypertension     No Known Problems Mother, Father, Sister, Brother, Maternal Aunt, Maternal Uncle, Paternal Aunt, Paternal Uncle, Maternal Grandmother, Maternal Grandfather, Paternal Grandmother          Tobacco Use    Smoking status: Never    Smokeless tobacco: Never   Substance and Sexual Activity    Alcohol use: Yes     Alcohol/week: 0.0 standard drinks of alcohol     Comment: occacionally    Drug use: No    Sexual activity: Not Currently     Review of Systems   Reason unable to perform ROS: AMS.   Gastrointestinal:  Positive for abdominal pain.   Neurological:  Positive for headaches.     Objective:     Vital Signs (Most Recent):  Temp: 99.2 °F (37.3 °C) (12/11/23 0729)  Pulse: (!) 124 (12/11/23 0729)  Resp: 18 (12/11/23 0729)  BP: 133/79 (12/11/23 0729)  SpO2: 98 % (12/11/23 0729) Vital Signs (24h Range):  Temp:  [98.6 °F (37 °C)-102 °F (38.9 °C)] 99.2 °F (37.3 °C)  Pulse:  [] 124  Resp:  [18-26] 18  SpO2:  [95 %-100 %] 98 %  BP: (120-149)/(66-86) 133/79     Weight: (!) 148.8 kg (328 lb)  Body mass index is 48.44 kg/m².     Physical Exam  Vitals and nursing note reviewed. Exam conducted with a chaperone present.   Constitutional:       General: He is not in acute distress.     Appearance: He is obese. He is ill-appearing.   HENT:      Head: Normocephalic and atraumatic.   Eyes:      Extraocular Movements: Extraocular movements intact.      Pupils: Pupils are equal, round, and reactive to light.   Cardiovascular:      Rate and Rhythm: Normal rate and regular rhythm.      Pulses: Normal pulses.      Heart sounds: Normal heart sounds.   Pulmonary:      Effort: Pulmonary effort is normal.      Breath sounds: Normal breath sounds.   Abdominal:      Palpations: Abdomen is soft.      Tenderness: There is abdominal tenderness (RUQ, Suprapubic).   Musculoskeletal:      Cervical back: Normal range of motion.       Right lower leg: Edema present.      Left lower leg: Edema present.   Skin:     General: Skin is warm and dry.   Neurological:      Mental Status: He is disoriented.      Motor: Weakness present.      Comments: Did not comply but moves legs with babinski, moves arms spontaneously              CRANIAL NERVES     CN III, IV, VI   Pupils are equal, round, and reactive to light.       Significant Labs: All pertinent labs within the past 24 hours have been reviewed.    Significant Imaging: I have reviewed all pertinent imaging results/findings within the past 24 hours.

## 2023-12-11 NOTE — PROGRESS NOTES
"Pharmacokinetic Initial Assessment: IV Vancomycin    Assessment/Plan:    Initiate intravenous vancomycin with loading dose of 2500 mg once followed by a maintenance dose of vancomycin 2000 mg IV every 24 hours  Desired empiric serum trough concentration is 15 to 20 mcg/mL  Draw vancomycin trough level 60 min prior to fourth dose on 12/13 at approximately 2200  Pharmacy will continue to follow and monitor vancomycin.      Please contact pharmacy at extension [phone] with any questions regarding this assessment.     Thank you for the consult,   Alisa Calvert       Patient brief summary:  Bj Pate Jr. is a 73 y.o. male initiated on antimicrobial therapy with IV Vancomycin for treatment of suspected bone/joint infection    Drug Allergies:   Review of patient's allergies indicates:   Allergen Reactions    Tomato (solanum lycopersicum) Hives    Naproxen Hives    Shrimp Other (See Comments)       Actual Body Weight:   148.8 kg    Renal Function:   Estimated Creatinine Clearance: 75.1 mL/min (based on SCr of 1.3 mg/dL).    Dialysis Method (if applicable):  N/A    CBC (last 72 hours):  Recent Labs   Lab Result Units 12/10/23  2006   WBC K/uL 12.74*   Hemoglobin g/dL 11.0*   Hematocrit % 33.5*   Platelets K/uL 257   Gran % % 78.4*   Lymph % % 6.0*   Mono % % 14.8   Eosinophil % % 0.1   Basophil % % 0.2   Differential Method  Automated       Metabolic Panel (last 72 hours):  Recent Labs   Lab Result Units 12/10/23  2006 12/10/23  2202   Sodium mmol/L 141  --    Potassium mmol/L 4.3  --    Chloride mmol/L 100  --    CO2 mmol/L 26  --    Glucose mg/dL 123*  --    Glucose, UA   --  Negative   BUN mg/dL 27*  --    Creatinine mg/dL 1.3  --    Albumin g/dL 2.4*  --    Total Bilirubin mg/dL 0.9  --    Alkaline Phosphatase U/L 73  --    AST U/L 30  --    ALT U/L 20  --    Magnesium mg/dL 1.5*  --        Drug levels (last 3 results):  No results for input(s): "VANCOMYCINRA", "VANCORANDOM", "VANCOMYCINPE", "VANCOPEAK", " ""VANCOMYCINTR", "VANCOTROUGH" in the last 72 hours.    Microbiologic Results:  Microbiology Results (last 7 days)       Procedure Component Value Units Date/Time    Blood culture x two cultures. Draw prior to antibiotics. [[id number]] Collected: 12/10/23 2007    Order Status: Completed Specimen: Blood from Peripheral, Antecubital, Left Updated: 12/11/23 0315     Blood Culture, Routine No Growth to date    Narrative:      Aerobic and anaerobic    Blood culture x two cultures. Draw prior to antibiotics. [[id number]] Collected: 12/10/23 2007    Order Status: Completed Specimen: Blood from Peripheral, Hand, Left Updated: 12/11/23 0315     Blood Culture, Routine No Growth to date    Narrative:      Aerobic and anaerobic            "
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
## 2023-12-11 NOTE — H&P
Connor Mina - Emergency Dept  [facility] Medicine  History & Physical    Patient Name: Bj Pate Jr.  MRN: [id number]  Patient Class: IP- Inpatient  Admission Date: 12/10/2023  Attending Physician: Krystian Reed MD   Primary Care Provider: Jose Antonio Foster MD         Patient information was obtained from relative(s) and ER records.     Subjective:     Principal Problem:Severe sepsis    Chief Complaint:   Chief Complaint   Patient presents with    Fever     Temp 102, HR in 130s, +pain with urination, odorous urine. +increased confusion, GCS of 14 at baseline. Hx of stroke.         HPI: This is a 73M with a h/o CVA  about 8 to 10 years ago with R sided deficits, DM, HTN who was BIB from home with family with 3 weeks of abdominal pain. Seen at multiple EDS, and generally unremarkable work up as per family. Within last few days started to have progressive worsening back pain. Became acute altered 2 days ago w/, LE weakness now requiring assistance to walk, as well as urinary incontinence. Patient is A/O 0x at admit. Accompanied by Daughter, most of history from her.     Admission to ED patient was febrile at 102 F, tachycardic 130, tachypneic at 23, hypertensive at 140/72 initially satting well on room air later requiring 2 L nasal cannula.  WBCs elevated 12.74 anemia 11.0 platelets normal, sed rate 120+, BUN 27 magnesium 1.5, albumin 2.4, .1, troponin 0.126 urine trace proteinuria trace ketones lactate initially elevated 2.6 later 1.3 CT head unremarkable for acute processes CT abdomen and pelvis and MRI lumbar spine shows left psoas abscess L2-L3 osteomyleitis, and diskitis.  Neurosurgery was consulted recommendations are pending.  Received Cefepime and Vanc    Past Medical History:   Diagnosis Date    Anemia 06/04/2021    Anticoagulant long-term use     Basal ganglia hemorrhage     Left basal ganglia hemorrhage with resultant right-sided hemiparesis which has resolved.     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        Past Surgical History:   Procedure Laterality Date    CARPAL TUNNEL RELEASE Left 2/19/2020    Procedure: RELEASE, CARPAL TUNNEL LEFT;  Surgeon: Maria Luisa Mccurdy MD;  Location: [facility] OR;  Service: Orthopedics;  Laterality: Left;    COLONOSCOPY N/A 7/28/2023    Procedure: COLONOSCOPY;  Surgeon: Jaylene Alvarado MD;  Location: [facility] ENDO;  Service: Endoscopy;  Laterality: N/A;    EPIDURAL STEROID INJECTION N/A 5/2/2019    Procedure: Injection, Steroid, Epidural Cervical;  Surgeon: Dariel Doan Jr., MD;  Location: [facility] ENDO;  Service: Pain Management;  Laterality: N/A;  Cervical Epidural Steroid Injection     [id number]    Arrive @ 1130; ASA; Check BG    EPIDURAL STEROID INJECTION Bilateral 5/29/2019    Procedure: Lumbar Medial Branch Blocks;  Surgeon: Dariel Doan Jr., MD;  Location: [facility] ENDO;  Service: Pain Management;  Laterality: Bilateral;  Bilateral Lumbar Medial Branch Blocks L3, L4, L5    [id number]  [id number]    Attive @ 1030 (11 arrival request); NO Sedation; ASA; Check BG    EPIDURAL STEROID INJECTION Bilateral 7/3/2019    Procedure: Lumbar Medial Branch Blocks;  Surgeon: Dariel Dona Jr., MD;  Location: [facility] ENDO;  Service: Pain Management;  Laterality: Bilateral;  Bilateral Lumbar Medial Branch Blocks L3, L4, L5    [id number]  [id number]    Arrive @ 0930; NO Sedation; ASA; Check BG    EPIDURAL STEROID INJECTION N/A 8/7/2019    Procedure: Injection, Steroid,  Epidural Cervical;  Surgeon: Dariel Doan Jr., MD;  Location: [facility] ENDO;  Service: Pain Management;  Laterality: N/A;  Cervical Epidural Steroid Injection C7-T1    [id number]    Arrive @ 1115; Last ASA 7/30; Check BG    ESOPHAGOGASTRODUODENOSCOPY N/A 7/28/2023    Procedure: EGD (ESOPHAGOGASTRODUODENOSCOPY);  Surgeon: Jaylene Alvarado MD;  Location: [facility] ENDO;  Service: Endoscopy;  Laterality: N/A;  inst via portal    LEFT HEART CATHETERIZATION Left 9/21/2021    Procedure: Left heart cath 9am start, R rad access;  Surgeon: Dariel Conner MD;  Location: [facility] CATH LAB;  Service: Cardiology;  Laterality: Left;  RN PRE OP Covid NEGATIVE ON  9-20-21.  C A    NO PAST SURGERIES         Review of patient's allergies indicates:   Allergen Reactions    Tomato (solanum lycopersicum) Hives    Naproxen Hives    Shrimp Other (See Comments)       No current facility-administered medications on file prior to encounter.     Current Outpatient Medications on File Prior to Encounter   Medication Sig    acetaminophen (TYLENOL) 325 MG tablet Take 2 tablets (650 mg total) by mouth every 6 (six) hours as needed for Pain or Temperature greater than (100.3).    albuterol (PROVENTIL/VENTOLIN HFA) 90 mcg/actuation inhaler Inhale 2 puffs into the lungs every 6 (six) hours as needed for Wheezing. Rescue    ascorbic acid, vitamin C, (VITAMIN C) 500 MG tablet Take 500 mg by mouth once daily.    aspirin (ECOTRIN) 81 MG EC tablet Take 81 mg by mouth once daily.    atorvastatin (LIPITOR) 40 MG tablet Take 1 tablet (40 mg total) by mouth once daily.    blood pressure test kit-large Kit 1 Device by Misc.(Non-Drug; Combo Route) route 2 (two) times daily.    blood sugar diagnostic Strp To check BG 2 times daily, to use with insurance preferred meter    blood-glucose meter kit To check BG 2 times daily, to use with insurance preferred meter    chlorhexidine (PERIDEX) 0.12 % solution Use as directed 15 mLs in the mouth or throat 2 (two) times  daily.    colchicine (COLCRYS) 0.6 mg tablet Take 1 tablet (0.6 mg total) by mouth daily as needed.    colchicine (MITIGARE) 0.6 mg Cap Take by mouth.    dicyclomine (BENTYL) 20 mg tablet TAKE 1 TABLET(20 MG) BY MOUTH THREE TIMES DAILY AS NEEDED FOR ABDOMINAL PAIN    diltiaZEM (CARDIZEM CD) 240 MG 24 hr capsule Take 1 capsule (240 mg total) by mouth once daily.    diphenoxylate-atropine 2.5-0.025 mg (LOMOTIL) 2.5-0.025 mg per tablet Take 1 tablet by mouth 4 (four) times daily as needed for Diarrhea. (Patient not taking: Reported on 11/2/2023)    fluticasone-salmeterol diskus inhaler 500-50 mcg Inhale 1 puff into the lungs 2 (two) times daily. Controller (Patient not taking: Reported on 12/7/2023)    furosemide (LASIX) 20 MG tablet Take 1 tablet (20 mg total) by mouth 2 (two) times daily.    gabapentin (NEURONTIN) 600 MG tablet TAKE 1 TABLET(600 MG) BY MOUTH THREE TIMES DAILY AS NEEDED    HYDROcodone-acetaminophen (NORCO) 5-325 mg per tablet Take 1 tablet by mouth every 6 (six) hours as needed for Pain. (Patient not taking: Reported on 12/7/2023)    lancets Misc To check BG 2 times daily, to use with insurance preferred meter    ondansetron (ZOFRAN-ODT) 4 MG TbDL Take 1 tablet (4 mg total) by mouth every 8 (eight) hours as needed (Nausea).    PFIZER COVID BIVAL,12Y UP,,PF, 30 mcg/0.3 mL injection     polyethylene glycol (GOLYTELY) 236-22.74-6.74 -5.86 gram suspension Take 4,000 mLs by mouth once.    PREVIDENT 5000 SENSITIVE 1.1-5 % Pste use as directed    tamsulosin (FLOMAX) 0.4 mg Cap     vitamin D (VITAMIN D3) 1000 units Tab Take 1,000 Units by mouth once daily.    [DISCONTINUED] nystatin (MYCOSTATIN) cream APPLY   TOPICALLY TO AFFECTED AREA TWICE DAILY     Family History       Problem Relation (Age of Onset)    Diabetes Paternal Grandfather    Heart disease Paternal Grandfather    Hypertension     No Known Problems Mother, Father, Sister, Brother, Maternal Aunt, Maternal Uncle, Paternal Aunt, Paternal Uncle,  Maternal Grandmother, Maternal Grandfather, Paternal Grandmother          Tobacco Use    Smoking status: Never    Smokeless tobacco: Never   Substance and Sexual Activity    Alcohol use: Yes     Alcohol/week: 0.0 standard drinks of alcohol     Comment: occacionally    Drug use: No    Sexual activity: Not Currently     Review of Systems   Reason unable to perform ROS: AMS.   Gastrointestinal:  Positive for abdominal pain.   Neurological:  Positive for headaches.     Objective:     Vital Signs (Most Recent):  Temp: 99.2 °F (37.3 °C) (12/11/23 0729)  Pulse: (!) 124 (12/11/23 0729)  Resp: 18 (12/11/23 0729)  BP: 133/79 (12/11/23 0729)  SpO2: 98 % (12/11/23 0729) Vital Signs (24h Range):  Temp:  [98.6 °F (37 °C)-102 °F (38.9 °C)] 99.2 °F (37.3 °C)  Pulse:  [] 124  Resp:  [18-26] 18  SpO2:  [95 %-100 %] 98 %  BP: (120-149)/(66-86) 133/79     Weight: (!) 148.8 kg (328 lb)  Body mass index is 48.44 kg/m².     Physical Exam  Vitals and nursing note reviewed. Exam conducted with a chaperone present.   Constitutional:       General: He is not in acute distress.     Appearance: He is obese. He is ill-appearing.   HENT:      Head: Normocephalic and atraumatic.   Eyes:      Extraocular Movements: Extraocular movements intact.      Pupils: Pupils are equal, round, and reactive to light.   Cardiovascular:      Rate and Rhythm: Normal rate and regular rhythm.      Pulses: Normal pulses.      Heart sounds: Normal heart sounds.   Pulmonary:      Effort: Pulmonary effort is normal.      Breath sounds: Normal breath sounds.   Abdominal:      Palpations: Abdomen is soft.      Tenderness: There is abdominal tenderness (RUQ, Suprapubic).   Musculoskeletal:      Cervical back: Normal range of motion.      Right lower leg: Edema present.      Left lower leg: Edema present.   Skin:     General: Skin is warm and dry.   Neurological:      Mental Status: He is disoriented.      Motor: Weakness present.      Comments: Did not comply but  "moves legs with babinski, moves arms spontaneously              CRANIAL NERVES     CN III, IV, VI   Pupils are equal, round, and reactive to light.       Significant Labs: All pertinent labs within the past 24 hours have been reviewed.    Significant Imaging: I have reviewed all pertinent imaging results/findings within the past 24 hours.  Assessment/Plan:     * Severe sepsis  This patient does have evidence of infective focus  My overall impression is sepsis.  Source:  Psoas Abscess, Osteomyelitis, Discitis, Possible Epidural abcess  Antibiotics given-   Antibiotics (72h ago, onward)      Start     Stop Route Frequency Ordered    12/11/23 2300  vancomycin 2 g in dextrose 5 % 500 mL IVPB         -- IV Every 24 hours (non-standard times) 12/11/23 0720    12/11/23 0800  ceFEPIme (MAXIPIME) 2 g in dextrose 5 % in water (D5W) 100 mL IVPB (MB+)         -- IV Every 8 hours (non-standard times) 12/11/23 0711    12/11/23 0752  vancomycin - pharmacy to dose  (vancomycin IVPB (PEDS and ADULTS))        See Hyperspace for full Linked Orders Report.    -- IV pharmacy to manage frequency 12/11/23 0654          Latest lactate reviewed-  No results for input(s): "LACTATE" in the last 72 hours.  Organ dysfunction indicate    Will Not start Pressors as bp is greater than 65.   Source control achieved by: abx ands possible interventions, pending nsgy and IR recs    AMS (altered mental status)  /ikely 2/2 sepsis      Acute osteomyelitis of lumbar spine    See sespsis    Urinary retention  Rivera placed by ED      History of CVA (cerebrovascular accident)  No acute processes on head CT        VTE Risk Mitigation (From admission, onward)           Ordered     IP VTE HIGH RISK PATIENT  Once         12/11/23 0652     Place sequential compression device  Until discontinued         12/11/23 0652                               Pharmacokinetic Initial Assessment: IV Vancomycin    Assessment/Plan:    Initiate intravenous vancomycin with loading " "dose of 2500 mg once followed by a maintenance dose of vancomycin 2000 mg IV every 24 hours  Desired empiric serum trough concentration is 15 to 20 mcg/mL  Draw vancomycin trough level 60 min prior to fourth dose on 12/13 at approximately 2200  Pharmacy will continue to follow and monitor vancomycin.      Please contact pharmacy at extension [phone] with any questions regarding this assessment.     Thank you for the consult,   Alisa Nehal       Patient brief summary:  Bj Pate Jr. is a 73 y.o. male initiated on antimicrobial therapy with IV Vancomycin for treatment of suspected bone/joint infection    Drug Allergies:   Review of patient's allergies indicates:   Allergen Reactions    Tomato (solanum lycopersicum) Hives    Naproxen Hives    Shrimp Other (See Comments)       Actual Body Weight:   148.8 kg    Renal Function:   Estimated Creatinine Clearance: 75.1 mL/min (based on SCr of 1.3 mg/dL).    Dialysis Method (if applicable):  N/A    CBC (last 72 hours):  Recent Labs   Lab Result Units 12/10/23  2006   WBC K/uL 12.74*   Hemoglobin g/dL 11.0*   Hematocrit % 33.5*   Platelets K/uL 257   Gran % % 78.4*   Lymph % % 6.0*   Mono % % 14.8   Eosinophil % % 0.1   Basophil % % 0.2   Differential Method  Automated       Metabolic Panel (last 72 hours):  Recent Labs   Lab Result Units 12/10/23  2006 12/10/23  2202   Sodium mmol/L 141  --    Potassium mmol/L 4.3  --    Chloride mmol/L 100  --    CO2 mmol/L 26  --    Glucose mg/dL 123*  --    Glucose, UA   --  Negative   BUN mg/dL 27*  --    Creatinine mg/dL 1.3  --    Albumin g/dL 2.4*  --    Total Bilirubin mg/dL 0.9  --    Alkaline Phosphatase U/L 73  --    AST U/L 30  --    ALT U/L 20  --    Magnesium mg/dL 1.5*  --        Drug levels (last 3 results):  No results for input(s): "VANCOMYCINRA", "VANCORANDOM", "VANCOMYCINPE", "VANCOPEAK", "VANCOMYCINTR", "VANCOTROUGH" in the last 72 hours.    Microbiologic Results:  Microbiology Results (last 7 days)       Procedure " Component Value Units Date/Time    Blood culture x two cultures. Draw prior to antibiotics. [[id number]] Collected: 12/10/23 2007    Order Status: Completed Specimen: Blood from Peripheral, Antecubital, Left Updated: 12/11/23 0315     Blood Culture, Routine No Growth to date    Narrative:      Aerobic and anaerobic    Blood culture x two cultures. Draw prior to antibiotics. [[id number]] Collected: 12/10/23 2007    Order Status: Completed Specimen: Blood from Peripheral, Hand, Left Updated: 12/11/23 0315     Blood Culture, Routine No Growth to date    Narrative:      Aerobic and anaerobic                 Jonathan Paredes DO  Department of Hospital Medicine  [facility] - Emergency Dept

## 2023-12-11 NOTE — CARE UPDATE
Discussed plan for possible psoas abscess aspiration by IR with HM. If IR unable to aspirate psoas abscess, NSGY ok with IR biopsy of L2-3 disc space to obtain cultures.       Heydi Perkins PA-C  Neurosurgery

## 2023-12-12 LAB
ALBUMIN SERPL BCP-MCNC: 1.6 G/DL (ref 3.5–5.2)
ALBUMIN SERPL BCP-MCNC: 1.7 G/DL (ref 3.5–5.2)
ALP SERPL-CCNC: 77 U/L (ref 55–135)
ALP SERPL-CCNC: 79 U/L (ref 55–135)
ALT SERPL W/O P-5'-P-CCNC: 32 U/L (ref 10–44)
ALT SERPL W/O P-5'-P-CCNC: 35 U/L (ref 10–44)
ANION GAP SERPL CALC-SCNC: 13 MMOL/L (ref 8–16)
ANION GAP SERPL CALC-SCNC: 7 MMOL/L (ref 8–16)
ASCENDING AORTA: 3.36 CM
AST SERPL-CCNC: 41 U/L (ref 10–40)
AST SERPL-CCNC: 51 U/L (ref 10–40)
AV INDEX (PROSTH): 1.36
AV MEAN GRADIENT: 4 MMHG
AV PEAK GRADIENT: 6 MMHG
AV VALVE AREA BY VELOCITY RATIO: 2.87 CM²
AV VALVE AREA: 4.94 CM²
AV VELOCITY RATIO: 0.79
BASOPHILS # BLD AUTO: 0.02 K/UL (ref 0–0.2)
BASOPHILS NFR BLD: 0.1 % (ref 0–1.9)
BILIRUB SERPL-MCNC: 1.5 MG/DL (ref 0.1–1)
BILIRUB SERPL-MCNC: 1.8 MG/DL (ref 0.1–1)
BNP SERPL-MCNC: 159 PG/ML (ref 0–99)
BSA FOR ECHO PROCEDURE: 2.42 M2
BUN SERPL-MCNC: 22 MG/DL (ref 8–23)
BUN SERPL-MCNC: 24 MG/DL (ref 8–23)
CALCIUM SERPL-MCNC: 9.6 MG/DL (ref 8.7–10.5)
CALCIUM SERPL-MCNC: 9.7 MG/DL (ref 8.7–10.5)
CHLORIDE SERPL-SCNC: 102 MMOL/L (ref 95–110)
CHLORIDE SERPL-SCNC: 103 MMOL/L (ref 95–110)
CO2 SERPL-SCNC: 20 MMOL/L (ref 23–29)
CO2 SERPL-SCNC: 27 MMOL/L (ref 23–29)
CREAT SERPL-MCNC: 1.1 MG/DL (ref 0.5–1.4)
CREAT SERPL-MCNC: 1.1 MG/DL (ref 0.5–1.4)
CRP SERPL-MCNC: 434.4 MG/L (ref 0–8.2)
CV ECHO LV RWT: 0.43 CM
DIFFERENTIAL METHOD BLD: ABNORMAL
DOP CALC AO PEAK VEL: 1.25 M/S
DOP CALC AO VTI: 15.38 CM
DOP CALC LVOT AREA: 3.6 CM2
DOP CALC LVOT DIAMETER: 2.15 CM
DOP CALC LVOT PEAK VEL: 0.99 M/S
DOP CALC LVOT STROKE VOLUME: 75.95 CM3
DOP CALCLVOT PEAK VEL VTI: 20.93 CM
ECHO LV POSTERIOR WALL: 0.97 CM (ref 0.6–1.1)
EOSINOPHIL # BLD AUTO: 0 K/UL (ref 0–0.5)
EOSINOPHIL NFR BLD: 0 % (ref 0–8)
ERYTHROCYTE [DISTWIDTH] IN BLOOD BY AUTOMATED COUNT: 14.5 % (ref 11.5–14.5)
ERYTHROCYTE [SEDIMENTATION RATE] IN BLOOD BY PHOTOMETRIC METHOD: >120 MM/HR (ref 0–23)
EST. GFR  (NO RACE VARIABLE): >60 ML/MIN/1.73 M^2
EST. GFR  (NO RACE VARIABLE): >60 ML/MIN/1.73 M^2
FRACTIONAL SHORTENING: 27 % (ref 28–44)
GLUCOSE SERPL-MCNC: 91 MG/DL (ref 70–110)
GLUCOSE SERPL-MCNC: 96 MG/DL (ref 70–110)
HCT VFR BLD AUTO: 34.3 % (ref 40–54)
HGB BLD-MCNC: 11 G/DL (ref 14–18)
IMM GRANULOCYTES # BLD AUTO: 0.18 K/UL (ref 0–0.04)
IMM GRANULOCYTES NFR BLD AUTO: 1.1 % (ref 0–0.5)
INTERVENTRICULAR SEPTUM: 0.75 CM (ref 0.6–1.1)
LA MAJOR: 6.3 CM
LA MINOR: 4.67 CM
LA WIDTH: 3.31 CM
LACTATE SERPL-SCNC: 1.6 MMOL/L (ref 0.5–2.2)
LACTATE SERPL-SCNC: 1.8 MMOL/L (ref 0.5–2.2)
LEFT ATRIUM SIZE: 2.66 CM
LEFT ATRIUM VOLUME INDEX: 17.2 ML/M2
LEFT ATRIUM VOLUME: 40.14 CM3
LEFT INTERNAL DIMENSION IN SYSTOLE: 3.29 CM (ref 2.1–4)
LEFT VENTRICLE DIASTOLIC VOLUME INDEX: 39.27 ML/M2
LEFT VENTRICLE DIASTOLIC VOLUME: 91.5 ML
LEFT VENTRICLE MASS INDEX: 53 G/M2
LEFT VENTRICLE SYSTOLIC VOLUME INDEX: 18.8 ML/M2
LEFT VENTRICLE SYSTOLIC VOLUME: 43.81 ML
LEFT VENTRICULAR INTERNAL DIMENSION IN DIASTOLE: 4.48 CM (ref 3.5–6)
LEFT VENTRICULAR MASS: 124.08 G
LYMPHOCYTES # BLD AUTO: 0.8 K/UL (ref 1–4.8)
LYMPHOCYTES NFR BLD: 5.1 % (ref 18–48)
MAGNESIUM SERPL-MCNC: 1.9 MG/DL (ref 1.6–2.6)
MCH RBC QN AUTO: 30.1 PG (ref 27–31)
MCHC RBC AUTO-ENTMCNC: 32.1 G/DL (ref 32–36)
MCV RBC AUTO: 94 FL (ref 82–98)
MONOCYTES # BLD AUTO: 2.3 K/UL (ref 0.3–1)
MONOCYTES NFR BLD: 14.4 % (ref 4–15)
NEUTROPHILS # BLD AUTO: 12.8 K/UL (ref 1.8–7.7)
NEUTROPHILS NFR BLD: 79.3 % (ref 38–73)
NRBC BLD-RTO: 0 /100 WBC
PHOSPHATE SERPL-MCNC: 3.5 MG/DL (ref 2.7–4.5)
PISA TR MAX VEL: 2.46 M/S
PLATELET # BLD AUTO: 221 K/UL (ref 150–450)
PLATELET BLD QL SMEAR: ABNORMAL
PMV BLD AUTO: 10.7 FL (ref 9.2–12.9)
POCT GLUCOSE: 117 MG/DL (ref 70–110)
POCT GLUCOSE: 119 MG/DL (ref 70–110)
POCT GLUCOSE: 137 MG/DL (ref 70–110)
POTASSIUM SERPL-SCNC: 4 MMOL/L (ref 3.5–5.1)
POTASSIUM SERPL-SCNC: 4.6 MMOL/L (ref 3.5–5.1)
PROT SERPL-MCNC: 6.8 G/DL (ref 6–8.4)
PROT SERPL-MCNC: 6.8 G/DL (ref 6–8.4)
RA MAJOR: 4.55 CM
RA PRESSURE ESTIMATED: 0 MMHG
RA WIDTH: 3.86 CM
RBC # BLD AUTO: 3.65 M/UL (ref 4.6–6.2)
RIGHT VENTRICULAR END-DIASTOLIC DIMENSION: 3.88 CM
RV TB RVSP: 2 MMHG
SINUS: 3.09 CM
SODIUM SERPL-SCNC: 136 MMOL/L (ref 136–145)
SODIUM SERPL-SCNC: 136 MMOL/L (ref 136–145)
STJ: 2.86 CM
TDI LATERAL: 0.13 M/S
TDI SEPTAL: 0.11 M/S
TDI: 0.12 M/S
TR MAX PG: 24 MMHG
TRICUSPID ANNULAR PLANE SYSTOLIC EXCURSION: 1.34 CM
TROPONIN I SERPL DL<=0.01 NG/ML-MCNC: 0.14 NG/ML (ref 0–0.03)
TROPONIN I SERPL DL<=0.01 NG/ML-MCNC: 0.17 NG/ML (ref 0–0.03)
TROPONIN I SERPL DL<=0.01 NG/ML-MCNC: 0.18 NG/ML (ref 0–0.03)
TV REST PULMONARY ARTERY PRESSURE: 24 MMHG
WBC # BLD AUTO: 16.18 K/UL (ref 3.9–12.7)
Z-SCORE OF LEFT VENTRICULAR DIMENSION IN END DIASTOLE: -7.45
Z-SCORE OF LEFT VENTRICULAR DIMENSION IN END SYSTOLE: -4.32

## 2023-12-12 PROCEDURE — 99223 1ST HOSP IP/OBS HIGH 75: CPT | Mod: ,,, | Performed by: REGISTERED NURSE

## 2023-12-12 PROCEDURE — 63600175 PHARM REV CODE 636 W HCPCS

## 2023-12-12 PROCEDURE — 85025 COMPLETE CBC W/AUTO DIFF WBC: CPT

## 2023-12-12 PROCEDURE — 83735 ASSAY OF MAGNESIUM: CPT

## 2023-12-12 PROCEDURE — 63600175 PHARM REV CODE 636 W HCPCS: Performed by: STUDENT IN AN ORGANIZED HEALTH CARE EDUCATION/TRAINING PROGRAM

## 2023-12-12 PROCEDURE — 25000003 PHARM REV CODE 250

## 2023-12-12 PROCEDURE — 25000242 PHARM REV CODE 250 ALT 637 W/ HCPCS: Performed by: STUDENT IN AN ORGANIZED HEALTH CARE EDUCATION/TRAINING PROGRAM

## 2023-12-12 PROCEDURE — 85652 RBC SED RATE AUTOMATED: CPT

## 2023-12-12 PROCEDURE — 80053 COMPREHEN METABOLIC PANEL: CPT | Mod: 91 | Performed by: STUDENT IN AN ORGANIZED HEALTH CARE EDUCATION/TRAINING PROGRAM

## 2023-12-12 PROCEDURE — 36415 COLL VENOUS BLD VENIPUNCTURE: CPT

## 2023-12-12 PROCEDURE — 99900035 HC TECH TIME PER 15 MIN (STAT)

## 2023-12-12 PROCEDURE — 93005 ELECTROCARDIOGRAM TRACING: CPT

## 2023-12-12 PROCEDURE — 94761 N-INVAS EAR/PLS OXIMETRY MLT: CPT

## 2023-12-12 PROCEDURE — 11000001 HC ACUTE MED/SURG PRIVATE ROOM

## 2023-12-12 PROCEDURE — 25500020 PHARM REV CODE 255: Performed by: STUDENT IN AN ORGANIZED HEALTH CARE EDUCATION/TRAINING PROGRAM

## 2023-12-12 PROCEDURE — 20600001 HC STEP DOWN PRIVATE ROOM

## 2023-12-12 PROCEDURE — C1751 CATH, INF, PER/CENT/MIDLINE: HCPCS

## 2023-12-12 PROCEDURE — 84100 ASSAY OF PHOSPHORUS: CPT

## 2023-12-12 PROCEDURE — 83880 ASSAY OF NATRIURETIC PEPTIDE: CPT

## 2023-12-12 PROCEDURE — 36410 VNPNXR 3YR/> PHY/QHP DX/THER: CPT

## 2023-12-12 PROCEDURE — 36415 COLL VENOUS BLD VENIPUNCTURE: CPT | Performed by: STUDENT IN AN ORGANIZED HEALTH CARE EDUCATION/TRAINING PROGRAM

## 2023-12-12 PROCEDURE — 87040 BLOOD CULTURE FOR BACTERIA: CPT

## 2023-12-12 PROCEDURE — 80053 COMPREHEN METABOLIC PANEL: CPT

## 2023-12-12 PROCEDURE — 84484 ASSAY OF TROPONIN QUANT: CPT | Mod: 91

## 2023-12-12 PROCEDURE — A9585 GADOBUTROL INJECTION: HCPCS | Performed by: STUDENT IN AN ORGANIZED HEALTH CARE EDUCATION/TRAINING PROGRAM

## 2023-12-12 PROCEDURE — 25000003 PHARM REV CODE 250: Performed by: STUDENT IN AN ORGANIZED HEALTH CARE EDUCATION/TRAINING PROGRAM

## 2023-12-12 PROCEDURE — 93010 ELECTROCARDIOGRAM REPORT: CPT | Mod: ,,, | Performed by: INTERNAL MEDICINE

## 2023-12-12 PROCEDURE — 86140 C-REACTIVE PROTEIN: CPT

## 2023-12-12 PROCEDURE — 76937 US GUIDE VASCULAR ACCESS: CPT

## 2023-12-12 PROCEDURE — 83605 ASSAY OF LACTIC ACID: CPT | Mod: 91

## 2023-12-12 PROCEDURE — 94640 AIRWAY INHALATION TREATMENT: CPT

## 2023-12-12 PROCEDURE — 83605 ASSAY OF LACTIC ACID: CPT | Performed by: STUDENT IN AN ORGANIZED HEALTH CARE EDUCATION/TRAINING PROGRAM

## 2023-12-12 RX ORDER — SODIUM CHLORIDE, SODIUM LACTATE, POTASSIUM CHLORIDE, CALCIUM CHLORIDE 600; 310; 30; 20 MG/100ML; MG/100ML; MG/100ML; MG/100ML
INJECTION, SOLUTION INTRAVENOUS CONTINUOUS
Status: DISCONTINUED | OUTPATIENT
Start: 2023-12-12 | End: 2023-12-12

## 2023-12-12 RX ORDER — GADOBUTROL 604.72 MG/ML
10 INJECTION INTRAVENOUS
Status: COMPLETED | OUTPATIENT
Start: 2023-12-12 | End: 2023-12-12

## 2023-12-12 RX ORDER — LORAZEPAM 2 MG/ML
0.5 INJECTION INTRAMUSCULAR ONCE AS NEEDED
Status: DISCONTINUED | OUTPATIENT
Start: 2023-12-12 | End: 2023-12-12

## 2023-12-12 RX ORDER — FLUTICASONE FUROATE AND VILANTEROL 200; 25 UG/1; UG/1
1 POWDER RESPIRATORY (INHALATION) DAILY
Status: DISCONTINUED | OUTPATIENT
Start: 2023-12-13 | End: 2023-12-29 | Stop reason: HOSPADM

## 2023-12-12 RX ORDER — MAGNESIUM SULFATE HEPTAHYDRATE 40 MG/ML
2 INJECTION, SOLUTION INTRAVENOUS ONCE
Status: COMPLETED | OUTPATIENT
Start: 2023-12-12 | End: 2023-12-12

## 2023-12-12 RX ORDER — LEVALBUTEROL INHALATION SOLUTION 0.63 MG/3ML
0.63 SOLUTION RESPIRATORY (INHALATION) EVERY 6 HOURS PRN
Status: DISCONTINUED | OUTPATIENT
Start: 2023-12-12 | End: 2023-12-18

## 2023-12-12 RX ORDER — MORPHINE SULFATE 2 MG/ML
1 INJECTION, SOLUTION INTRAMUSCULAR; INTRAVENOUS ONCE
Status: COMPLETED | OUTPATIENT
Start: 2023-12-12 | End: 2023-12-12

## 2023-12-12 RX ADMIN — MORPHINE SULFATE 1 MG: 2 INJECTION, SOLUTION INTRAMUSCULAR; INTRAVENOUS at 05:12

## 2023-12-12 RX ADMIN — LEVALBUTEROL HYDROCHLORIDE 0.63 MG: 0.63 SOLUTION RESPIRATORY (INHALATION) at 12:12

## 2023-12-12 RX ADMIN — LORAZEPAM 0.5 MG: 2 INJECTION INTRAMUSCULAR; INTRAVENOUS at 09:12

## 2023-12-12 RX ADMIN — CEFEPIME 2 G: 2 INJECTION, POWDER, FOR SOLUTION INTRAVENOUS at 03:12

## 2023-12-12 RX ADMIN — ACETAMINOPHEN 650 MG: 650 SUPPOSITORY RECTAL at 10:12

## 2023-12-12 RX ADMIN — CEFEPIME 2 G: 2 INJECTION, POWDER, FOR SOLUTION INTRAVENOUS at 12:12

## 2023-12-12 RX ADMIN — MAGNESIUM SULFATE HEPTAHYDRATE 2 G: 40 INJECTION, SOLUTION INTRAVENOUS at 01:12

## 2023-12-12 RX ADMIN — VANCOMYCIN HYDROCHLORIDE 2000 MG: 500 INJECTION, POWDER, LYOPHILIZED, FOR SOLUTION INTRAVENOUS at 10:12

## 2023-12-12 RX ADMIN — SODIUM CHLORIDE, POTASSIUM CHLORIDE, SODIUM LACTATE AND CALCIUM CHLORIDE 500 ML: 600; 310; 30; 20 INJECTION, SOLUTION INTRAVENOUS at 04:12

## 2023-12-12 RX ADMIN — SODIUM CHLORIDE, POTASSIUM CHLORIDE, SODIUM LACTATE AND CALCIUM CHLORIDE: 600; 310; 30; 20 INJECTION, SOLUTION INTRAVENOUS at 11:12

## 2023-12-12 RX ADMIN — GADOBUTROL 10 ML: 604.72 INJECTION INTRAVENOUS at 10:12

## 2023-12-12 RX ADMIN — MEROPENEM 2 G: 1 INJECTION INTRAVENOUS at 05:12

## 2023-12-12 RX ADMIN — MAGNESIUM SULFATE HEPTAHYDRATE 2 G: 40 INJECTION, SOLUTION INTRAVENOUS at 06:12

## 2023-12-12 RX ADMIN — METRONIDAZOLE 500 MG: 500 INJECTION, SOLUTION INTRAVENOUS at 01:12

## 2023-12-12 RX ADMIN — METRONIDAZOLE 500 MG: 500 INJECTION, SOLUTION INTRAVENOUS at 06:12

## 2023-12-12 NOTE — PROGRESS NOTES
Connor Mina - Neurosurgery ([facility])  [facility] Medicine  Progress Note    Patient Name: Bj Pate Jr.  MRN: [id number]  Patient Class: IP- Inpatient   Admission Date: 12/10/2023  Length of Stay: 1 days  Attending Physician: Krystian Reed MD  Primary Care Provider: Jose Antonio Foster MD        Subjective:     Principal Problem:Severe sepsis        HPI:  This is a 73M with a h/o CVA  about 8 to 10 years ago with R sided deficits, DM, HTN who was BIB from home with family with 3 weeks of abdominal pain. Seen at multiple EDS, and generally unremarkable work up as per family. Within last few days started to have progressive worsening back pain. Became acute altered 2 days ago w/, LE weakness now requiring assistance to walk, as well as urinary incontinence. Patient is A/O 0x at admit. Accompanied by Daughter, most of history from her.     Admission to ED patient was febrile at 102 F, tachycardic 130, tachypneic at 23, hypertensive at 140/72 initially satting well on room air later requiring 2 L nasal cannula.  WBCs elevated 12.74 anemia 11.0 platelets normal, sed rate 120+, BUN 27 magnesium 1.5, albumin 2.4, .1, troponin 0.126 urine trace proteinuria trace ketones lactate initially elevated 2.6 later 1.3 CT head unremarkable for acute processes CT abdomen and pelvis and MRI lumbar spine shows left psoas abscess L2-L3 osteomyleitis, and diskitis.  Neurosurgery was consulted recommendations are pending.  Received Cefepime and Vanc    Overview/Hospital Course:  Underwent imaging negative for further infectious sources. ID consulted. Escalated to meropenem    Interval History: fevered overnight to 101.3, received rectal tylenol, 500cc bolus, CPAP. Delay in starting vanc yesterday. Escalated to meropenem. CRP uptrending.    Less interactive on exam, no longer vocalizing. persistently tachycardic to 120s. Likely d/t fever, pain. MRIs limited by motion, no new abnormalities identified. MRI Brain with possible infarct,  likely artifact. Discussed with [facility] Neuro, low suspicion for new infarct, if so, too small to attribute to current symptoms.     DVT US negative. Echo with reduced EF to 45%, gentle IVF    Plan for ID abscess drain tomorrow, disc biopsy via NSGY Friday     Review of Systems   Unable to perform ROS: Patient nonverbal     Objective:     Vital Signs (Most Recent):  Temp: 99.3 °F (37.4 °C) (12/12/23 1237)  Pulse: (!) 119 (12/12/23 1547)  Resp: (!) 28 (12/12/23 1237)  BP: (!) 152/80 (12/12/23 1237)  SpO2: 95 % (12/12/23 1237) Vital Signs (24h Range):  Temp:  [96.7 °F (35.9 °C)-101.3 °F (38.5 °C)] 99.3 °F (37.4 °C)  Pulse:  [101-155] 119  Resp:  [14-30] 28  SpO2:  [94 %-97 %] 95 %  BP: (136-164)/(79-92) 152/80     Weight: 120.2 kg (265 lb)  Body mass index is 39.13 kg/m².    Intake/Output Summary (Last 24 hours) at 12/12/2023 1730  Last data filed at 12/12/2023 1625  Gross per 24 hour   Intake 2210 ml   Output 1050 ml   Net 1160 ml         Physical Exam  Vitals and nursing note reviewed. Exam conducted with a chaperone present.   Constitutional:       General: He is not in acute distress.     Appearance: He is obese. He is ill-appearing.   HENT:      Head: Normocephalic and atraumatic.   Eyes:      Extraocular Movements: Extraocular movements intact.      Pupils: Pupils are equal, round, and reactive to light.   Cardiovascular:      Rate and Rhythm: Normal rate and regular rhythm.      Pulses: Normal pulses.      Heart sounds: Normal heart sounds.   Pulmonary:      Effort: Pulmonary effort is normal.      Breath sounds: Normal breath sounds.   Abdominal:      Palpations: Abdomen is soft.      Tenderness: There is abdominal tenderness (RUQ, Suprapubic).   Musculoskeletal:      Cervical back: Normal range of motion.      Right lower leg: Edema present.      Left lower leg: Edema present.   Skin:     General: Skin is warm and dry.   Neurological:      Mental Status: He is disoriented.      Motor: Weakness present.       Comments: Did not comply but moves legs with babinski, moves arms spontaneously             Significant Labs: All pertinent labs within the past 24 hours have been reviewed.    Significant Imaging: I have reviewed all pertinent imaging results/findings within the past 24 hours.    Assessment/Plan:      * Severe sepsis  This patient does have evidence of infective focus  My overall impression is sepsis.  Source:  Psoas Abscess, Osteomyelitis, Discitis, Possible Epidural abcess  Antibiotics given-   Antibiotics (72h ago, onward)      Start     Stop Route Frequency Ordered    12/12/23 1700  meropenem (MERREM) 2 g in sodium chloride 0.9% 100 mL IVPB         -- IV Every 8 hours (non-standard times) 12/12/23 1537    12/11/23 2300  vancomycin 2 g in dextrose 5 % 500 mL IVPB         -- IV Every 24 hours (non-standard times) 12/11/23 0720    12/11/23 0752  vancomycin - pharmacy to dose  (vancomycin IVPB (PEDS and ADULTS))        See Hyperspace for full Linked Orders Report.    -- IV pharmacy to manage frequency 12/11/23 0654          Latest lactate reviewed-  Recent Labs   Lab 12/11/23  1649   LACTATE 1.4     Organ dysfunction indicate    Will Not start Pressors as bp is greater than 65.   Source control achieved by: abx ands possible interventions, pending nsgy and IR recs    On Vanc, Meropenem  - inflammatory markers climbing, has not defervesced, plan for IR abscess drain tomorrow for source control.   - ID consulted  - gentle IVF given HFrEF    AMS (altered mental status)  likely 2/2 sepsis  Worsening, no new insults on MRI brain per Stroke    Acute osteomyelitis of lumbar spine  See sepsis    Urinary retention  Rivera placed by ED, PVR when able    History of CVA (cerebrovascular accident)  No acute processes on head CT  Repeat MRI reviewed by Vascular Neuro, likely artifact    Morbid obesity with BMI of 45.0-49.9, adult  Body mass index is 39.13 kg/m². Morbid obesity complicates all aspects of disease management from  diagnostic modalities to treatment. Weight loss encouraged and health benefits explained to patient.           VTE Risk Mitigation (From admission, onward)           Ordered     IP VTE HIGH RISK PATIENT  Once         12/11/23 0652     Place sequential compression device  Until discontinued         12/11/23 0652                    Discharge Planning   GLORIA: 12/15/2023     Code Status: Full Code   Is the patient medically ready for discharge?: No    Reason for patient still in hospital (select all that apply): Treatment  Discharge Plan A: Skilled Nursing Facility                  Edilia Lau MD  Department of Hospital Medicine   [facility] - Neurosurgery ([facility])

## 2023-12-12 NOTE — PLAN OF CARE
Problem: Infection  Goal: Absence of Infection Signs and Symptoms  Outcome: Ongoing, Progressing  Intervention: Prevent or Manage Infection  Flowsheets (Taken 12/12/2023 0333)  Fever Reduction/Comfort Measures:   cool cloth applied   ice pack(s) applied  Infection Management: cultures obtained and sent to lab     Problem: Adult Inpatient Plan of Care  Goal: Plan of Care Review  Outcome: Ongoing, Progressing  Goal: Patient-Specific Goal (Individualized)  Outcome: Ongoing, Progressing  Goal: Absence of Hospital-Acquired Illness or Injury  Outcome: Ongoing, Progressing  Goal: Optimal Comfort and Wellbeing  Outcome: Ongoing, Progressing     Problem: Bariatric Environmental Safety  Goal: Safety Maintained with Care  Outcome: Ongoing, Progressing     Problem: Adjustment to Illness (Sepsis/Septic Shock)  Goal: Optimal Coping  Outcome: Ongoing, Progressing  Intervention: Optimize Psychosocial Adjustment to Illness  Flowsheets (Taken 12/12/2023 0333)  Family/Support System Care: caregiver stress acknowledged     Problem: Glycemic Control Impaired (Sepsis/Septic Shock)  Goal: Blood Glucose Level Within Desired Range  Outcome: Ongoing, Progressing     Problem: Infection Progression (Sepsis/Septic Shock)  Goal: Absence of Infection Signs and Symptoms  Outcome: Ongoing, Progressing     Problem: Fluid and Electrolyte Imbalance (Acute Kidney Injury/Impairment)  Goal: Fluid and Electrolyte Balance  Outcome: Ongoing, Progressing  Intervention: Monitor and Manage Fluid and Electrolyte Balance  Flowsheets (Taken 12/12/2023 0330)  Fluid/Electrolyte Management: electrolyte supplement initiated     Problem: Renal Function Impairment (Acute Kidney Injury/Impairment)  Goal: Effective Renal Function  Outcome: Ongoing, Progressing  Intervention: Monitor and Support Renal Function  Flowsheets (Taken 12/12/2023 0333)  Medication Review/Management: medications reviewed     Problem: Impaired Wound Healing  Goal: Optimal Wound  Healing  Outcome: Ongoing, Progressing  Intervention: Promote Wound Healing  Flowsheets (Taken 12/12/2023 0330)  Activity Management: Rolling - L1     Problem: Skin Injury Risk Increased  Goal: Skin Health and Integrity  Outcome: Ongoing, Progressing  Intervention: Optimize Skin Protection  Flowsheets (Taken 12/12/2023 0330)  Pressure Reduction Techniques:   frequent weight shift encouraged   weight shift assistance provided  Pressure Reduction Devices:   foam padding utilized   positioning supports utilized  Head of Bed (HOB) Positioning: HOB at 30-45 degrees

## 2023-12-12 NOTE — CARE UPDATE
"RAPID RESPONSE NURSE CHART REVIEW        Chart Reviewed: 12/12/2023, 10:49 AM    MRN: [id number]  Bed: 161/959 A    Dx: Severe sepsis    Bj Pate Jr. 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    Last VS: BP (!) 159/89   Pulse (!) 120   Temp 98.9 °F (37.2 °C) (Axillary)   Resp 14   Ht 5' 9" (1.753 m)   Wt 120.2 kg (265 lb)   SpO2 95%   BMI 39.13 kg/m²     24H Vital Sign Range:  Temp:  [96.7 °F (35.9 °C)-101.3 °F (38.5 °C)]   Pulse:  [101-155]   Resp:  [14-30]   BP: (125-164)/(74-92)   SpO2:  [94 %-100 %]     Level of Consciousness (AVPU): responds to voice    Recent Labs     12/11/23  0722 12/11/23  2108 12/12/23  0533   WBC 11.86 16.33* 16.18*   HGB 11.0* 11.1* 11.0*   HCT 33.5* 33.5* 34.3*    281 221       Recent Labs     12/11/23  0722 12/11/23  2108 12/12/23  0533    136 136   K 4.4 4.2 4.6    102 103   CO2 26 25 20*   BUN 23 21 22   CREATININE 1.1 1.2 1.1   GLU 99 99 91   PHOS 3.9  --  3.5   MG 1.6 1.6 1.9        No results for input(s): "PH", "PCO2", "PO2", "HCO3", "POCSATURATED", "BE" in the last 72 hours.     OXYGEN:  Flow (L/min): 4     MEWS score: 3    Rounding completed with charge BAILEE Mathur. Pt in MRI. Discussed tachycardia and continued confusion. MD aware. No additional concerns verbalized at this time. Instructed to call [phone] for further concerns or assistance.    Shira Miller RN       "

## 2023-12-12 NOTE — PROGRESS NOTES
Pharmacist Renal Dose Adjustment Note    Bj Pate Jr. is a 73 y.o. male being treated with the medication Meropenem 1 G    Patient Data:    Vital Signs (Most Recent):  Temp: 99.3 °F (37.4 °C) (12/12/23 1237)  Pulse: (!) 119 (12/12/23 1547)  Resp: (!) 28 (12/12/23 1237)  BP: (!) 152/80 (12/12/23 1237)  SpO2: 95 % (12/12/23 1237) Vital Signs (72h Range):  Temp:  [96.7 °F (35.9 °C)-102 °F (38.9 °C)]   Pulse:  []   Resp:  [14-30]   BP: (120-164)/(66-92)   SpO2:  [94 %-100 %]      Recent Labs   Lab 12/11/23  0722 12/11/23  2108 12/12/23  0533   CREATININE 1.1 1.2 1.1     Serum creatinine: 1.1 mg/dL 12/12/23 0533  Estimated creatinine clearance: 78.8 mL/min    Medication:Meropenem dose: 1 G frequency Q 12 H will be changed to medication:Meropenem dose:2 G frequency:Q 8 H    Pharmacist's Name: Jacob Franco  Pharmacist's Extension: [phone]

## 2023-12-12 NOTE — PROGRESS NOTES
Wound care consult received for gluteal cleft.  Reviewed chart for this encounter.    On arrival, pt MALIK to MRI. Will attempt assessment at later time.

## 2023-12-12 NOTE — NURSING
Pt's heart rate sustaining in the 140's-150's. MD on called paged and awaiting response. This writer also received call from rapid and informed them of waiting for call back. Per rapid instruction, this writer is to recheck temperature and BP; please refer to VS flow sheet.     2100: Rapid at bedside to evaluate the patient. Temperature rechecked with a different thermometer. At recheck, pt temperature is 101.3. Order were placed and MD re-paged by rapid team.     Dr. Lovelace also came to bedside to assess the patient.     Please refer to rapid nurse care update for further details.

## 2023-12-12 NOTE — ASSESSMENT & PLAN NOTE
This patient does have evidence of infective focus  My overall impression is sepsis.  Source:  Psoas Abscess, Osteomyelitis, Discitis, Possible Epidural abcess  Antibiotics given-   Antibiotics (72h ago, onward)      Start     Stop Route Frequency Ordered    12/12/23 1700  meropenem (MERREM) 2 g in sodium chloride 0.9% 100 mL IVPB         -- IV Every 8 hours (non-standard times) 12/12/23 1537    12/11/23 2300  vancomycin 2 g in dextrose 5 % 500 mL IVPB         -- IV Every 24 hours (non-standard times) 12/11/23 0720    12/11/23 0752  vancomycin - pharmacy to dose  (vancomycin IVPB (PEDS and ADULTS))        See Hyperspace for full Linked Orders Report.    -- IV pharmacy to manage frequency 12/11/23 0654          Latest lactate reviewed-  Recent Labs   Lab 12/11/23  1649   LACTATE 1.4     Organ dysfunction indicate    Will Not start Pressors as bp is greater than 65.   Source control achieved by: abx ands possible interventions, pending nsgy and IR recs    On Vanc, Meropenem  - inflammatory markers climbing, has not defervesced, plan for IR abscess drain tomorrow for source control.   - ID consulted  - gentle IVF given HFrEF

## 2023-12-12 NOTE — CONSULTS
[facility])  Infectious Disease  Consult Note    Patient Name: Bj Pate Jr.  MRN: [id number]  Admission Date: 12/10/2023  Hospital Length of Stay: 1 days  Attending Physician: Krystian Reed MD  Primary Care Provider: Jose Antonio Foster MD     Isolation Status: No active isolations    Patient information was obtained from patient, past medical records, and ER records.      Inpatient consult to Infectious Diseases  Consult performed by: Christiano Ashley NP  Consult ordered by: Edilia Lau MD        Assessment/Plan:     ID  Acute osteomyelitis of lumbar spine  74 yo male with PMH of CVA, CKD3, HTN, DM2, COPD who presents with increasing lethargy and confusion, who sustained a fall, and then reported urinary retention, lower extremity weakness.     Pt remains febrile, tachycardic. With leukocytosis. Elevated CRP/sed rate. Procal 1.37. continues to be altered, confused. MRI lumbar spine noting L2-3 discitis and possible L3-4 osteo, as well as left psoas abscess (2cm). Unable to exclude epidural abscess. MRI of brain, cervical spine, thoracic spine without acute findings, but limited study due to motion. ECHO negative. Blood cultures from admit are negative thus far. IR planning psoas abscess aspiration, NSGY planning lumbar decompression and fusion. Pt currently on cefepime, vanc. ID consulted for antibiotic recs.     Recommendations:  - continue vanc. Inpatient pharm to dose. Trough goal 15-20  - discontinue cefepime. Start meropenem 2g IV q8hr.   - will follow culture data and tailor antibiotics as needed   - will follow surgical plans   - plan reviewed with ID staff. ID will follow.         Thank you for your consult. I will follow-up with patient. Please contact us if you have any additional questions.    Christiano Ashley NP  Infectious Disease  [facility] Neuro[facility] \[facility] [place] [facility]\"")    Subjective:     Principal Problem: Severe sepsis    HPI: Mr. Pate is a 74 yo male with PMH of CVA,  CKD3, HTN, DM2, COPD who presents with increasing lethargy and confusion.     Pt remains altered during assessment, history gathered per family at bedside and chart review. Per chart review, pt reported ~3 weeks of low back pain, as well as a fall leading up to presentation. Following the fall, pt reported lower extremity weakness and urinary retention. Daughter at bedside states that pt has not had any surgical procedures or injections recently, but was treated with pain management in 2019 and had lumbar and cervical injections. Pt is retired, but was a  previously. Family denied him having pets, denied adventurous hobbies, denied raw food intake. Daughter reported recent ER admission for abdominal pain. States pt required medication for a parasite that improved his symptoms. Upon chart review, appears pt presented in August with abdominal pain, CT AP was negative for acute findings. Pt was discharged and followed up with PCP shortly after and stated his symptoms had improved.     On admission, pt was febrile to 102, and tachycardic, with increasing leukocytosis, 13->16k, stable anemia, elevated sed/CRP: >120, 375, procal 1.37. blood cultures negative on admission. MRI lumbar spine noting discitis/osteomyelitis at L2-3 with left psoas abscess (2.2cm), as well as early osteo L3-4, noting difficult study and epidural abscess was difficult to exclude. CT head without evidence of superimposed acute intracranial process. MRI brain, cervical spine, and thoracic spine pending.     NSGY following and recommended IR consult for potential psoas abscess drainage and culture. Planning L2-pelvis decompression and fusion on 12/15/23.  IR planning CT guided aspiration/drainage of psoas abscess on either 12/12 or 12/13.     Pt is currently on vanc and cefepime. ID was consulted for antibiotic recs.         Past Medical History:   Diagnosis Date    Anemia 06/04/2021    Anticoagulant long-term use     Basal ganglia  hemorrhage     Left basal ganglia hemorrhage with resultant right-sided hemiparesis which has resolved.     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        Past Surgical History:   Procedure Laterality Date    CARPAL TUNNEL RELEASE Left 2/19/2020    Procedure: RELEASE, CARPAL TUNNEL LEFT;  Surgeon: Maria Luisa Mccurdy MD;  Location: [facility] OR;  Service: Orthopedics;  Laterality: Left;    COLONOSCOPY N/A 7/28/2023    Procedure: COLONOSCOPY;  Surgeon: Jaylene Alvarado MD;  Location: [facility] ENDO;  Service: Endoscopy;  Laterality: N/A;    EPIDURAL STEROID INJECTION N/A 5/2/2019    Procedure: Injection, Steroid, Epidural Cervical;  Surgeon: Dariel Doan Jr., MD;  Location: [facility] ENDO;  Service: Pain Management;  Laterality: N/A;  Cervical Epidural Steroid Injection     18061    Arrive @ 1130; ASA; Check BG    EPIDURAL STEROID INJECTION Bilateral 5/29/2019    Procedure: Lumbar Medial Branch Blocks;  Surgeon: Dariel Doan Jr., MD;  Location: [facility];  Service: Pain Management;  Laterality: Bilateral;  Bilateral Lumbar Medial Branch Blocks L3, L4, L5    [id number]  [id number]    Attive @ 1030 (11 arrival request); NO Sedation; ASA; Check BG    EPIDURAL STEROID INJECTION Bilateral 7/3/2019    Procedure: Lumbar Medial Branch Blocks;  Surgeon: Dariel Doan Jr., MD;  Location: [facility];  Service: Pain Management;  Laterality: Bilateral;  Bilateral Lumbar Medial Branch Blocks L3, L4,  L5    [id number]  [id number]    Arrive @ 0930; NO Sedation; ASA; Check BG    EPIDURAL STEROID INJECTION N/A 8/7/2019    Procedure: Injection, Steroid, Epidural Cervical;  Surgeon: Dariel Doan Jr., MD;  Location: [facility] ENDO;  Service: Pain Management;  Laterality: N/A;  Cervical Epidural Steroid Injection C7-T1    24331    Arrive @ 1115; Last ASA 7/30; Check BG    ESOPHAGOGASTRODUODENOSCOPY N/A 7/28/2023    Procedure: EGD (ESOPHAGOGASTRODUODENOSCOPY);  Surgeon: Jaylene Alvarado MD;  Location: [facility] ENDO;  Service: Endoscopy;  Laterality: N/A;  inst via portal    LEFT HEART CATHETERIZATION Left 9/21/2021    Procedure: Left heart cath 9am start, R rad access;  Surgeon: Dariel Conner MD;  Location: [facility] CATH LAB;  Service: Cardiology;  Laterality: Left;  RN PRE OP Covid NEGATIVE ON  9-20-21.  C A    MIDLINE CATHETER PLACEMENT  12/12/2023    NO PAST SURGERIES         Review of patient's allergies indicates:   Allergen Reactions    Tomato (solanum lycopersicum) Hives    Naproxen Hives    Shrimp Other (See Comments)       Medications:  Medications Prior to Admission   Medication Sig    acetaminophen (TYLENOL) 650 MG TbSR Take 1,300 mg by mouth every 6 (six) hours as needed (Pain).    ascorbic acid, vitamin C, (VITAMIN C) 500 MG tablet Take 500 mg by mouth once daily.    atorvastatin (LIPITOR) 40 MG tablet Take 1 tablet (40 mg total) by mouth once daily.    cyclobenzaprine (FLEXERIL) 10 MG tablet Take 10 mg by mouth 3 (three) times daily as needed for Muscle spasms.    docusate sodium (COLACE) 100 MG capsule Take 100 mg by mouth daily as needed for Constipation.    furosemide (LASIX) 20 MG tablet Take 1 tablet (20 mg total) by mouth 2 (two) times daily.    gabapentin (NEURONTIN) 600 MG tablet TAKE 1 TABLET(600 MG) BY MOUTH THREE TIMES DAILY AS NEEDED    HYDROcodone-acetaminophen (NORCO) 5-325 mg per tablet Take 1 tablet by mouth every 6 (six) hours as needed for Pain.    PREVIDENT 5000 SENSITIVE 1.1-5 % Pste Brush teeth  every other day as directed.    tamsulosin (FLOMAX) 0.4 mg Cap Take 0.4 mg by mouth once daily.    aspirin (ECOTRIN) 81 MG EC tablet Take 81 mg by mouth once daily.    blood pressure test kit-large Kit 1 Device by Misc.(Non-Drug; Combo Route) route 2 (two) times daily.    blood sugar diagnostic Strp To check BG 2 times daily, to use with insurance preferred meter    blood-glucose meter kit To check BG 2 times daily, to use with insurance preferred meter    diltiaZEM (CARDIZEM CD) 240 MG 24 hr capsule Take 1 capsule (240 mg total) by mouth once daily. (Patient not taking: Reported on 12/11/2023)    fluticasone-salmeterol diskus inhaler 500-50 mcg Inhale 1 puff into the lungs 2 (two) times daily. Controller    lancets Misc To check BG 2 times daily, to use with insurance preferred meter    PFIZER COVID BIVAL,12Y UP,,PF, 30 mcg/0.3 mL injection      Antibiotics (From admission, onward)      Start     Stop Route Frequency Ordered    12/11/23 2300  vancomycin 2 g in dextrose 5 % 500 mL IVPB         -- IV Every 24 hours (non-standard times) 12/11/23 0720    12/11/23 1610  metronidazole IVPB 500 mg         -- IV Every 8 hours (non-standard times) 12/11/23 1610    12/11/23 0800  ceFEPIme (MAXIPIME) 2 g in dextrose 5 % in water (D5W) 100 mL IVPB (MB+)         -- IV Every 8 hours (non-standard times) 12/11/23 0711    12/11/23 0752  vancomycin - pharmacy to dose  (vancomycin IVPB (PEDS and ADULTS))        See Hyperspace for full Linked Orders Report.    -- IV pharmacy to manage frequency 12/11/23 0654          Antifungals (From admission, onward)      None          Antivirals (From admission, onward)      None             Immunization History   Administered Date(s) Administered    COVID-19, MRNA, LN-S, PF (MODERNA FULL 0.5 ML DOSE) 05/17/2022    COVID-19, MRNA, LN-S, PF (Pfizer) (Purple Cap) 09/22/2022    COVID-19, mRNA, LNP-S, PF, kim-sucrose, 30 mcg/0.3 mL (Pfizer 2023 Ages 12+) 10/12/2023    COVID-19, mRNA, LNP-S, bivalent  booster, PF (Moderna Omicron)12 + YEARS 09/22/2022    COVID-19, mRNA, LNP-S, bivalent booster, PF (PFIZER OMICRON) 06/14/2023    COVID-19, vector-nr, rS-Ad26, PF (Apurva) 03/08/2021, 11/05/2021    Influenza 10/22/2012, 02/28/2014    Influenza (FLUAD) - Quadrivalent - Adjuvanted - PF *Preferred* (65+) 09/06/2023    Influenza (FLUAD) - Trivalent - Adjuvanted - PF (65+) 10/10/2018, 10/10/2018, 09/12/2019, 09/12/2019    Influenza - High Dose - PF (65 years and older) 10/23/2017, 10/23/2017    Influenza - Quadrivalent 09/23/2015    Influenza - Quadrivalent - High Dose - PF (65 years and older) 09/10/2020, 09/10/2020, 09/29/2021, 09/22/2022    Influenza - Quadrivalent - PF *Preferred* (6 months and older) 12/03/2016    Influenza - Trivalent (ADULT) 10/22/2012, 02/28/2014    Influenza Split 10/22/2012, 02/28/2014    Pneumococcal Conjugate - 13 Valent 02/19/2016, 10/10/2018, 10/10/2018    Pneumococcal Polysaccharide - 23 Valent 03/07/2017, 09/12/2019, 09/12/2019, 09/10/2020, 09/10/2020    RSVpreF (Arexvy) 10/12/2023    Tdap 02/19/2016       Family History       Problem Relation (Age of Onset)    Diabetes Paternal Grandfather    Heart disease Paternal Grandfather    Hypertension     No Known Problems Mother, Father, Sister, Brother, Maternal Aunt, Maternal Uncle, Paternal Aunt, Paternal Uncle, Maternal Grandmother, Maternal Grandfather, Paternal Grandmother          Social History     Socioeconomic History    Marital status:     Number of children: 8    Highest education level: 11th grade   Occupational History    Occupation:    Tobacco Use    Smoking status: Never    Smokeless tobacco: Never   Substance and Sexual Activity    Alcohol use: Yes     Alcohol/week: 0.0 standard drinks of alcohol     Comment: occacionally    Drug use: No    Sexual activity: Not Currently     Social Determinants of Health     Financial Resource Strain: Low Risk  (3/7/2023)    Overall Financial Resource Strain (CARDIA)      Difficulty of Paying Living Expenses: Not very hard   Food Insecurity: No Food Insecurity (3/7/2023)    Hunger Vital Sign     Worried About Running Out of Food in the Last Year: Never true     Ran Out of Food in the Last Year: Never true   Transportation Needs: No Transportation Needs (3/7/2023)    PRAPARE - Transportation     Lack of Transportation (Medical): No     Lack of Transportation (Non-Medical): No   Physical Activity: Inactive (3/7/2023)    Exercise Vital Sign     Days of Exercise per Week: 0 days     Minutes of Exercise per Session: 0 min   Stress: No Stress Concern Present (3/7/2023)    [employer] - Occupational Stress Questionnaire     Feeling of Stress : Not at all   Social Connections: Socially Isolated (3/7/2023)    Social Connection and Isolation Panel [NHANES]     Frequency of Communication with Friends and Family: More than three times a week     Frequency of Social Gatherings with Friends and Family: More than three times a week     Attends Bahai Services: Never     Active Member of Clubs or Organizations: No     Attends Club or Organization Meetings: Never     Marital Status:    Housing Stability: Unknown (3/7/2023)    Housing Stability Vital Sign     Unable to Pay for Housing in the Last Year: No     Unstable Housing in the Last Year: No     Review of Systems   Unable to perform ROS: Patient nonverbal     Objective:     Vital Signs (Most Recent):  Temp: 99.3 °F (37.4 °C) (12/12/23 1237)  Pulse: (!) 126 (12/12/23 1237)  Resp: (!) 28 (12/12/23 1237)  BP: (!) 152/80 (12/12/23 1237)  SpO2: 95 % (12/12/23 1237) Vital Signs (24h Range):  Temp:  [96.7 °F (35.9 °C)-101.3 °F (38.5 °C)] 99.3 °F (37.4 °C)  Pulse:  [101-155] 126  Resp:  [14-30] 28  SpO2:  [94 %-100 %] 95 %  BP: (125-164)/(79-92) 152/80     Weight: 120.2 kg (265 lb)  Body mass index is 39.13 kg/m².    Estimated Creatinine Clearance: 78.8 mL/min (based on SCr of 1.1 mg/dL).     Physical Exam  Vitals  reviewed.   Constitutional:       General: He is not in acute distress.     Appearance: He is ill-appearing. He is not toxic-appearing or diaphoretic.   HENT:      Nose: Nose normal.      Mouth/Throat:      Mouth: Mucous membranes are moist.      Pharynx: Oropharynx is clear.   Cardiovascular:      Rate and Rhythm: Normal rate and regular rhythm.      Pulses: Normal pulses.      Heart sounds: Normal heart sounds. No murmur heard.  Pulmonary:      Effort: Pulmonary effort is normal. No respiratory distress.      Breath sounds: Normal breath sounds. No stridor.   Abdominal:      General: Abdomen is flat. There is no distension.      Palpations: Abdomen is soft.      Tenderness: There is no abdominal tenderness. There is no guarding or rebound.   Musculoskeletal:         General: Swelling present. No deformity. Normal range of motion.      Right lower leg: No edema.      Left lower leg: No edema.   Skin:     General: Skin is warm.      Findings: No lesion or rash.   Neurological:      Mental Status: He is disoriented.      Motor: Weakness present.      Gait: Gait abnormal.          Significant Labs: All pertinent labs within the past 24 hours have been reviewed.    Significant Imaging: I have reviewed all pertinent imaging results/findings within the past 24 hours.

## 2023-12-12 NOTE — PLAN OF CARE
[facility] - Neurosurgery (Hospital)  Initial Discharge Assessment       Primary Care Provider: Jose Antonio Foster MD    Admission Diagnosis: Hypomagnesemia [E83.42]  Endocarditis [I38]  Tachycardia [R00.0]  Chest pain [R07.9]  Sepsis, due to unspecified organism, unspecified whether acute organ dysfunction present [A41.9]    Admission Date: 12/10/2023  Expected Discharge Date: 12/15/2023    Transition of Care Barriers: None    Payor: [employer] MEDICARE / Plan: [employer] 65 / Product Type: Medicare Advantage /     Extended Emergency Contact Information  Primary Emergency Contact: Herlinda Bj RAMIREZ  Mobile Phone: [phone]  Relation: Son  Preferred language: English   needed? No  Secondary Emergency Contact: Joseph Pate   [place]  Mobile Phone: [phone]  Relation: Daughter    Discharge Plan A: Skilled Nursing Facility  [facility] B[id number] [place] [facility] [address]  [facility] [place] [address]  Phone: [phone] Fax: [phone]      Initial Assessment (most recent)       Adult Discharge Assessment - 12/12/23 1544          Discharge Assessment    Assessment Type Discharge Planning Assessment     Confirmed/corrected address, phone number and insurance Yes     Confirmed Demographics Correct on Facesheet     Source of Information family     Reason For Admission Sepsis     People in Home alone     Facility Arrived From: home     Do you expect to return to your current living situation? Yes     Do you have help at home or someone to help you manage your care at home? Yes     Who are your caregiver(s) and their phone number(s)? mae Pate [phone]     Prior to hospitilization cognitive status: Unable to Assess     Current cognitive status: Inappropriate Behavior     Walking or Climbing Stairs Difficulty yes     Walking or Climbing  Stairs ambulation difficulty, requires equipment     Dressing/Bathing Difficulty yes     Dressing/Bathing bathing difficulty, requires equipment     Equipment Currently Used at Home shower chair;walker, rolling     Do you currently have service(s) that help you manage your care at home? No     Do you have any problems affording any of your prescribed medications? No     Who is going to help you get home at discharge? family     How do you get to doctors appointments? car, drives self;family or friend will provide     Are you on dialysis? No     Do you take coumadin? No     Discharge Plan A Skilled Nursing Facility     Discharge Plan B Home Health     DME Needed Upon Discharge  other (see comments)   TBD    Transition of Care Barriers None        Physical Activity    On average, how many days per week do you engage in moderate to strenuous exercise (like a brisk walk)? 0 days     On average, how many minutes do you engage in exercise at this level? 0 min        Financial Resource Strain    How hard is it for you to pay for the very basics like food, housing, medical care, and heating? Not very hard        Housing Stability    In the last 12 months, was there a time when you were not able to pay the mortgage or rent on time? No     In the last 12 months, was there a time when you did not have a steady place to sleep or slept in a shelter (including now)? No        Transportation Needs    In the past 12 months, has lack of transportation kept you from medical appointments or from getting medications? No     In the past 12 months, has lack of transportation kept you from meetings, work, or from getting things needed for daily living? No        Food Insecurity    Within the past 12 months, you worried that your food would run out before you got the money to buy more. Never true     Within the past 12 months, the food you bought just didn't last and you didn't have money to get more. Never true        Stress    Do you  feel stress - tense, restless, nervous, or anxious, or unable to sleep at night because your mind is troubled all the time - these days? Patient refused        Social Connections    In a typical week, how many times do you talk on the phone with family, friends, or neighbors? More than three times a week     How often do you get together with friends or relatives? More than three times a week     How often do you attend Sabianism or Orthodox services? Patient refused     Do you belong to any clubs or organizations such as Sabianism groups, unions, [employer] or athletic groups, or school groups? Patient refused     How often do you attend meetings of the clubs or organizations you belong to? Patient refused        Alcohol Use    Q1: How often do you have a drink containing alcohol? Patient refused     Q2: How many drinks containing alcohol do you have on a typical day when you are drinking? Patient refused     Q3: How often do you have six or more drinks on one occasion? Patient refused                   Pt lives in a 1 story duplex with brother next door, 1 step to enter.  Pt uses a RW, shower chair, and CPAP for HME, denies BT/HD/home O2.  Pt's family can provide transportation home at time of d/c.  Will continue to follow for d/c needs.    Discharge Plan A and Plan B have been determined by review of patient's clinical status, future medical and therapeutic needs, and coverage/benefits for post-acute care in coordination with multidisciplinary team members.     Nicki Davis RN CM  Case Management  [id number]

## 2023-12-12 NOTE — PROCEDURES
RAPID RESPONSE VASCULAR ACCESS NOTE       Single lumen 20G, 10CM midline placed in the left basilic vein. Needle advanced into the vessel under real time ultrasound guidance.    Max dwell date: 1/10/2024   Lot number: [id number]

## 2023-12-12 NOTE — SUBJECTIVE & OBJECTIVE
Interval History: fevered overnight to 101.3, received rectal tylenol, 500cc bolus, CPAP. Delay in starting vanc yesterday. Escalated to meropenem. CRP uptrending.    Less interactive on exam, no longer vocalizing. persistently tachycardic to 120s. Likely d/t fever, pain. MRIs limited by motion, no new abnormalities identified. MRI Brain with possible infarct, likely artifact. Discussed with [facility], low suspicion for new infarct, if so, too small to attribute to current symptoms.     DVT US negative. Echo with reduced EF to 45%, gentle IVF    Plan for ID abscess drain tomorrow, disc biopsy via NSGY Friday     Review of Systems   Unable to perform ROS: Patient nonverbal     Objective:     Vital Signs (Most Recent):  Temp: 99.3 °F (37.4 °C) (12/12/23 1237)  Pulse: (!) 119 (12/12/23 1547)  Resp: (!) 28 (12/12/23 1237)  BP: (!) 152/80 (12/12/23 1237)  SpO2: 95 % (12/12/23 1237) Vital Signs (24h Range):  Temp:  [96.7 °F (35.9 °C)-101.3 °F (38.5 °C)] 99.3 °F (37.4 °C)  Pulse:  [101-155] 119  Resp:  [14-30] 28  SpO2:  [94 %-97 %] 95 %  BP: (136-164)/(79-92) 152/80     Weight: 120.2 kg (265 lb)  Body mass index is 39.13 kg/m².    Intake/Output Summary (Last 24 hours) at 12/12/2023 1730  Last data filed at 12/12/2023 1625  Gross per 24 hour   Intake 2210 ml   Output 1050 ml   Net 1160 ml         Physical Exam  Vitals and nursing note reviewed. Exam conducted with a chaperone present.   Constitutional:       General: He is not in acute distress.     Appearance: He is obese. He is ill-appearing.   HENT:      Head: Normocephalic and atraumatic.   Eyes:      Extraocular Movements: Extraocular movements intact.      Pupils: Pupils are equal, round, and reactive to light.   Cardiovascular:      Rate and Rhythm: Normal rate and regular rhythm.      Pulses: Normal pulses.      Heart sounds: Normal heart sounds.   Pulmonary:      Effort: Pulmonary effort is normal.      Breath sounds: Normal breath sounds.   Abdominal:       Palpations: Abdomen is soft.      Tenderness: There is abdominal tenderness (RUQ, Suprapubic).   Musculoskeletal:      Cervical back: Normal range of motion.      Right lower leg: Edema present.      Left lower leg: Edema present.   Skin:     General: Skin is warm and dry.   Neurological:      Mental Status: He is disoriented.      Motor: Weakness present.      Comments: Did not comply but moves legs with babinski, moves arms spontaneously             Significant Labs: All pertinent labs within the past 24 hours have been reviewed.    Significant Imaging: I have reviewed all pertinent imaging results/findings within the past 24 hours.

## 2023-12-12 NOTE — ASSESSMENT & PLAN NOTE
A 73M w/ hx CVA, CKD3, HTN, DM2, and COPD who presents with AMS likely 2/2 underling sepsis as well as progressive back pain and inability to ambulate for the past 2 weeks due to progressive L2-L3 osteodiscitis and presumed mechanical instability.        CT shows lumbar spondylosis and progressive errosive changes at L2-L3   MRI with lumbar spondylosis 2/2 multilevel of DDD and L2-L3 osteodiscitis wrose at L2-L4 with moderate to severe stenosis as well as L psoas abscess.      Infection markers elevated: , CRP >120. Bcx negative thus far 12/10 and 12/12. On broad spectrum abx     Now s/p IR bx yday and attempted to aspirated psoas abscess 12/13        Planning for for L1-L4 decompression and fusion  Follow up disc bx  MRI w/wo C and T spine obtained but suboptimal without evidence of infection other where else; repeat MRI C/T w/ contrast without overt canal stenosis to explain his weakness. Will proceed to the OR.  TTE completed, EF 45-50 %  ID consultation and continue broad spec abx   Please trend infection markers   DVT US BLE: negative   Cleared by HM  TF held, DVT ppx held  Further care per primary team  Update provided to patient's daughter Joseph over the phone. Agreed to plan.      Dispo: OR today     D/w Dr. Rincon

## 2023-12-12 NOTE — ASSESSMENT & PLAN NOTE
74 yo male with PMH of CVA, CKD3, HTN, DM2, COPD who presents with increasing lethargy and confusion, who sustained a fall, and then reported urinary retention, lower extremity weakness.     Pt remains febrile, tachycardic. With leukocytosis. Elevated CRP/sed rate. Procal 1.37. continues to be altered, confused. MRI lumbar spine noting L2-3 discitis and possible L3-4 osteo, as well as left psoas abscess (2cm). Unable to exclude epidural abscess. MRI of brain, cervical spine, thoracic spine without acute findings, but limited study due to motion. Blood cultures from admit are negative thus far. IR planning psoas abscess aspiration, NSGY planning lumbar decompression and fusion. Pt currently on cefepime, vanc. ID consulted for antibiotic recs.     Recommendations:  - continue vanc. Inpatient pharm to dose. Trough goal 15-20  - discontinue cefepime. Start meropenem 2g IV q8hr.   - will follow culture data and tailor antibiotics as needed   - will follow surgical plans   - pt seen and plan reviewed with ID staff. ID will follow.

## 2023-12-12 NOTE — ASSESSMENT & PLAN NOTE
Body mass index is 39.13 kg/m². Morbid obesity complicates all aspects of disease management from diagnostic modalities to treatment. Weight loss encouraged and health benefits explained to patient.

## 2023-12-12 NOTE — PROGRESS NOTES
Progress Note  Neurosurgery    12/12/2023  8:20 AM    Admit Date: 12/10/2023  Pt is hospital day  LOS: 1 day     Hospital Day: 1  Post-operative Day:    POD: * No surgery date entered *  Hospital Day: 3    Active problems:  Patient Active Problem List   Diagnosis    Obstructive sleep apnea on CPAP: setting = 8    Morbid obesity with BMI of 45.0-49.9, adult    Chronic idiopathic gout of multiple sites    Chronic kidney disease, stage 3a    CKD (chronic kidney disease)    History of CVA (cerebrovascular accident)    History of cerebral parenchymal hemorrhage    Essential hypertension    Decreased spinal mobility    Decreased functional mobility and endurance    Cervical radiculopathy    Lumbar spondylosis    DDD (degenerative disc disease), lumbar    DDD (degenerative disc disease), cervical    Osteoarthritis of spine with radiculopathy, cervical region    Carpal tunnel syndrome on both sides    Ulnar neuropathy of both upper extremities    Left lateral epicondylitis    Left carpal tunnel syndrome    Generalized weakness    Acute kidney injury superimposed on chronic kidney disease    Transaminitis    Urinary retention    Chronic gout of left wrist    Polyarticular gout    Acute idiopathic gout of multiple sites    Urinary tract infection with hematuria    Hyperkalemia    Hyponatremia    Leukocytosis    Pseudomonas infection    Decreased strength, endurance, and mobility    Gait instability    Balance problem    Alteration in instrumental activities of daily living (IADL)    Decreased  strength    SOBOE (shortness of breath on exertion)    Anemia    Pulmonary hypertension    Mixed hyperlipidemia    Abnormal nuclear stress test    Reactive airway disease without complication    Hyperlipidemia, acquired    Aortic root dilatation    Colitis    Left hip pain    Torticollis    Acute osteomyelitis of lumbar spine    Severe sepsis    AMS (altered mental status)     Active Hospital Problems    Diagnosis  POA    *Severe  sepsis [A41.9, R65.20]  Yes    Acute osteomyelitis of lumbar spine [M46.26]  Unknown    AMS (altered mental status) [R41.82]  Yes    Urinary retention [R33.9]  Yes    History of CVA (cerebrovascular accident) [Z86.73]  Not Applicable    Morbid obesity with BMI of 45.0-49.9, adult [E66.01, Z68.42]  Not Applicable     Patient is aware of need for weight loss  and has been asked to make an effort to improve diet        Resolved Hospital Problems   No resolved problems to display.         SUBJECTIVE:   HPI:   Mr. Pate is a 73M w/ hx CVA, CKD3, HTN, DM2, COPD who presents with increasing lethargy and confusion. Per ED team, patient had 3 weeks of low back pain, with a fall yesterday leading to his presentation. He had subjective lower extremity weakness and urinary retention after the fall. Septic on presentation to ED. Too altered to meaningfully participate in spine motor exam at time of NSGY evaluation.      Follow-up For:  Procedure(s) (LRB):  FUSION, SPINE, LUMBAR, TLIF, POSTERIOR APPROACH, USING PEDICLE SCREW (N/A)      Interval HPI:   Imaging completed. He remained delirious and altered. Bx NGTD. On broad spectrum abx         OBJECTIVE:     Vitals:  Vital Signs Range (Last 24H):  Temp:  [96.7 °F (35.9 °C)-101.3 °F (38.5 °C)]   Pulse:  [101-155]   Resp:  [14-30]   BP: (125-164)/(74-92)   SpO2:  [94 %-100 %]     I & O (Last 24H):    Intake/Output Summary (Last 24 hours) at 12/12/2023 0820  Last data filed at 12/12/2023 0037  Gross per 24 hour   Intake 1614.72 ml   Output 800 ml   Net 814.72 ml         Physical Exam:  General: appears well-developed and well-nourished, no distress  HEENT: PERRLA, R FD   Cardiovascular: RRR  Pulm: NWOB  Abdominal: soft  Neuro:   Altered. Not following commands but move spontaneously       Move/withdrew BUE and BLE (minimally to pain L>R)   Baseline R side weakness and stiffness from remote stroke   Lumbar midline tenderness     Labs:  Recent Results (from the past 24 hour(s))    Lactic acid, plasma    Collection Time: 12/11/23  4:49 PM   Result Value Ref Range    Lactate (Lactic Acid) 1.4 0.5 - 2.2 mmol/L   Basic metabolic panel    Collection Time: 12/11/23  9:08 PM   Result Value Ref Range    Sodium 136 136 - 145 mmol/L    Potassium 4.2 3.5 - 5.1 mmol/L    Chloride 102 95 - 110 mmol/L    CO2 25 23 - 29 mmol/L    Glucose 99 70 - 110 mg/dL    BUN 21 8 - 23 mg/dL    Creatinine 1.2 0.5 - 1.4 mg/dL    Calcium 9.8 8.7 - 10.5 mg/dL    Anion Gap 9 8 - 16 mmol/L    eGFR >60.0 >60 mL/min/1.73 m^2   Magnesium    Collection Time: 12/11/23  9:08 PM   Result Value Ref Range    Magnesium 1.6 1.6 - 2.6 mg/dL   CBC auto differential    Collection Time: 12/11/23  9:08 PM   Result Value Ref Range    WBC 16.33 (H) 3.90 - 12.70 K/uL    RBC 3.61 (L) 4.60 - 6.20 M/uL    Hemoglobin 11.1 (L) 14.0 - 18.0 g/dL    Hematocrit 33.5 (L) 40.0 - 54.0 %    MCV 93 82 - 98 fL    MCH 30.7 27.0 - 31.0 pg    MCHC 33.1 32.0 - 36.0 g/dL    RDW 14.2 11.5 - 14.5 %    Platelets 281 150 - 450 K/uL    MPV 10.2 9.2 - 12.9 fL    Immature Granulocytes 0.7 (H) 0.0 - 0.5 %    Gran # (ANC) 13.6 (H) 1.8 - 7.7 K/uL    Immature Grans (Abs) 0.11 (H) 0.00 - 0.04 K/uL    Lymph # 0.6 (L) 1.0 - 4.8 K/uL    Mono # 2.0 (H) 0.3 - 1.0 K/uL    Eos # 0.0 0.0 - 0.5 K/uL    Baso # 0.02 0.00 - 0.20 K/uL    nRBC 0 0 /100 WBC    Gran % 83.4 (H) 38.0 - 73.0 %    Lymph % 3.8 (L) 18.0 - 48.0 %    Mono % 12.0 4.0 - 15.0 %    Eosinophil % 0.0 0.0 - 8.0 %    Basophil % 0.1 0.0 - 1.9 %    Differential Method Automated    Troponin I    Collection Time: 12/11/23  9:08 PM   Result Value Ref Range    Troponin I 0.175 (H) 0.000 - 0.026 ng/mL   POCT glucose    Collection Time: 12/11/23  9:09 PM   Result Value Ref Range    POCT Glucose 120 (H) 70 - 110 mg/dL   Procalcitonin    Collection Time: 12/11/23  9:23 PM   Result Value Ref Range    Procalcitonin 1.37 (H) <0.25 ng/mL   SARS-Cov2 (COVID) with FLU/RSV by PCR    Collection Time: 12/11/23  9:32 PM   Result Value Ref  Range    SARS-CoV2 (COVID-19) Qualitative PCR Not Detected Not Detected    Influenza A, Molecular Not Detected Not Detected    Influenza B, Molecular Not Detected Not Detected    RSV Ag by Molecular Method Not Detected Not Detected   Blood culture    Collection Time: 12/12/23 12:33 AM    Specimen: Blood   Result Value Ref Range    Blood Culture, Routine No Growth to date    Blood culture    Collection Time: 12/12/23 12:33 AM    Specimen: Blood   Result Value Ref Range    Blood Culture, Routine No Growth to date    Comprehensive Metabolic Panel (CMP)    Collection Time: 12/12/23  5:33 AM   Result Value Ref Range    Sodium 136 136 - 145 mmol/L    Potassium 4.6 3.5 - 5.1 mmol/L    Chloride 103 95 - 110 mmol/L    CO2 20 (L) 23 - 29 mmol/L    Glucose 91 70 - 110 mg/dL    BUN 22 8 - 23 mg/dL    Creatinine 1.1 0.5 - 1.4 mg/dL    Calcium 9.7 8.7 - 10.5 mg/dL    Total Protein 6.8 6.0 - 8.4 g/dL    Albumin 1.7 (L) 3.5 - 5.2 g/dL    Total Bilirubin 1.8 (H) 0.1 - 1.0 mg/dL    Alkaline Phosphatase 77 55 - 135 U/L    AST 51 (H) 10 - 40 U/L    ALT 35 10 - 44 U/L    eGFR >60.0 >60 mL/min/1.73 m^2    Anion Gap 13 8 - 16 mmol/L   Magnesium    Collection Time: 12/12/23  5:33 AM   Result Value Ref Range    Magnesium 1.9 1.6 - 2.6 mg/dL   Phosphorus    Collection Time: 12/12/23  5:33 AM   Result Value Ref Range    Phosphorus 3.5 2.7 - 4.5 mg/dL   CBC with Automated Differential    Collection Time: 12/12/23  5:33 AM   Result Value Ref Range    WBC 16.18 (H) 3.90 - 12.70 K/uL    RBC 3.65 (L) 4.60 - 6.20 M/uL    Hemoglobin 11.0 (L) 14.0 - 18.0 g/dL    Hematocrit 34.3 (L) 40.0 - 54.0 %    MCV 94 82 - 98 fL    MCH 30.1 27.0 - 31.0 pg    MCHC 32.1 32.0 - 36.0 g/dL    RDW 14.5 11.5 - 14.5 %    Platelets 221 150 - 450 K/uL    MPV 10.7 9.2 - 12.9 fL    Immature Granulocytes 1.1 (H) 0.0 - 0.5 %    Gran # (ANC) 12.8 (H) 1.8 - 7.7 K/uL    Immature Grans (Abs) 0.18 (H) 0.00 - 0.04 K/uL    Lymph # 0.8 (L) 1.0 - 4.8 K/uL    Mono # 2.3 (H) 0.3 - 1.0  K/uL    Eos # 0.0 0.0 - 0.5 K/uL    Baso # 0.02 0.00 - 0.20 K/uL    nRBC 0 0 /100 WBC    Gran % 79.3 (H) 38.0 - 73.0 %    Lymph % 5.1 (L) 18.0 - 48.0 %    Mono % 14.4 4.0 - 15.0 %    Eosinophil % 0.0 0.0 - 8.0 %    Basophil % 0.1 0.0 - 1.9 %    Platelet Estimate Appears normal     Differential Method Automated          Recent Labs   Lab 12/11/23 0722 12/11/23 2108 12/12/23 0533   WBC 11.86 16.33* 16.18*   HGB 11.0* 11.1* 11.0*   HCT 33.5* 33.5* 34.3*    281 221       Recent Labs   Lab 12/11/23 0722 12/11/23 2108 12/12/23 0533    136 136   K 4.4 4.2 4.6    102 103   CO2 26 25 20*   BUN 23 21 22   CREATININE 1.1 1.2 1.1   CALCIUM 10.0 9.8 9.7       Microbiology Results (last 7 days)       Procedure Component Value Units Date/Time    Blood culture [[id number]] Collected: 12/12/23 0033    Order Status: Completed Specimen: Blood Updated: 12/12/23 0715     Blood Culture, Routine No Growth to date    Blood culture [[id number]] Collected: 12/12/23 0033    Order Status: Completed Specimen: Blood Updated: 12/12/23 0715     Blood Culture, Routine No Growth to date    Blood culture x two cultures. Draw prior to antibiotics. [[id number]] Collected: 12/10/23 2007    Order Status: Completed Specimen: Blood from Peripheral, Antecubital, Left Updated: 12/11/23 2213     Blood Culture, Routine No Growth to date      No Growth to date    Narrative:      Aerobic and anaerobic    Blood culture x two cultures. Draw prior to antibiotics. [[id number]] Collected: 12/10/23 2007    Order Status: Completed Specimen: Blood from Peripheral, Hand, Left Updated: 12/11/23 2213     Blood Culture, Routine No Growth to date      No Growth to date    Narrative:      Aerobic and anaerobic            Imaging:  Imaging Results              MRI Brain W WO Contrast (No Result on File)                       MRI Lumbar Spine W WO Cont (Final result)  Result time 12/11/23 03:18:20      Final result by Dayne Marquez MD (12/11/23  03:18:20)                   Impression:      Findings suggestive of discitis/osteomyelitis at L2-L3 with left psoas abscess measuring up to 2.2 cm in size.    Probable early discitis/osteomyelitis at L3-L4.    Motion limited study.  Thin epidural abscess is difficult to exclude.    Advanced multilevel degenerative changes throughout the lumbar spine with severe central canal stenosis and significant bilateral neural foraminal narrowing at L4-L5.    Multilevel facet arthropathy and facet edema, which could be exaggerated by aforementioned inflammatory changes in the paraspinal soft tissues.    Additional findings discussed in the body of the report.    This report was flagged in Epic as abnormal.      Electronically signed by: Dayne Marquez MD  Date:    12/11/2023  Time:    03:18               Narrative:    EXAMINATION:  MRI LUMBAR SPINE W WO CONTRAST    CLINICAL HISTORY:  Low back pain, progressive neurologic deficit;    TECHNIQUE:  Multiplanar, multisequence MR imaging of the lumbar spine was obtained with and without 10 mL Gadavist IV contrast.    COMPARISON:  CT, 12/10/2023.    FINDINGS:  Exam quality is significantly limited by motion.    Reversal of the normal lumbar lordosis.  Minimal retrolisthesis of L2 with respect to L3.  Minimal anterolisthesis of L4 with respect to L5.  Otherwise, grossly normal sagittal alignment.    Extensive inflammatory changes and edema in the paraspinal muscles and psoas muscles throughout the majority of the lumbar spine.  Multifocal bilateral facet edema and facet degenerative changes.    Widening of the disc space at L2-L3 with increased STIR signal and enhancement.  Associated edema signal and enhancement involving the inferior endplate of L2 and superior endplate of L3.  There is also subtle increased edema and enhancement involving the L3-L4 disc space.  Findings are suggestive of discitis osteomyelitis.    There is an ill-defined and poorly evaluated collection in the left  psoas muscle at the level of L2-L3 measuring roughly 2.2 x 1.8 x 1.8 cm (series 13, image 18 and series 10, image 20).  This finding is most consistent with an associated abscess or phlegmon.    Evaluation of the central canal and cauda quinine a significantly limited by motion and image noise.  There is mild enhancement of the thecal sac raising the question of small epidural abscess, though this finding is not definitive as evaluation is significantly limited by motion.    L1-2: Severe disc space height loss.  Mild-to-moderate bilateral neural foraminal narrowing.  No severe central canal stenosis.    L2-3: Widening of the intervertebral disc space with increased disc space signal and enhancement consistent with discitis/osteomyelitis.  At least moderate bilateral neural foraminal narrowing and mild central canal stenosis.    L3-4: Significant disc space height loss.  Abnormal signal and enhancement involving the L3-L4 disc space.  Broad-based posterior disc bulge extending into the central canal and bilateral lateral recesses.  At least moderate bilateral neural foraminal narrowing and moderate central canal stenosis.    L4-5: Mild disc space height loss and disc desiccation.  Broad-based posterior disc bulge and ligamentum flavum hypertrophy contributes to severe central canal stenosis and moderate bilateral neural foraminal narrowing.    L5-S1:  Mild disc space height loss and disc desiccation.  Small posterior disc bulge.  Mild right greater than left neural foraminal narrowing.  No significant central canal stenosis.                                        CT Chest Abdomen Pelvis With IV Contrast (XPD) NO Oral Contrast (Final result)  Result time 12/10/23 22:09:41      Final result by Dayne Marquez MD (12/10/23 22:09:41)                   Impression:      Motion and artifact limited study.    New subtle inflammatory changes in the paraspinal soft tissues and retroperitoneum/psoas muscles at the level  L2-L3 with slightly worse widening of the L2-L3 disc space and possible early endplate erosive changes at this level.  Underlying discitis/osteomyelitis at this level is a consideration.  Suggest correlation with symptoms, risk factors, and inflammatory markers.  Lumbar spine MRI with and without contrast may provide improved sensitivity and further evaluation of the central canal if clinically warranted.    Mildly worse bibasilar subsegmental atelectasis and possible trace bilateral pleural fluid.  Superimposed infectious/inflammatory process difficult to exclude in the lung bases secondary to extensive artifact in this region.    Probable hepatic steatosis.    Mild stool burden in the colon with moderate distension of the rectum with solid stool and trace presacral edema.  Suggest correlation for constipation or stercoral colitis.    Advanced degenerative changes in the spine with multilevel central canal stenosis and neural foraminal narrowing most pronounced in the lower lumbar spine.    Prostatomegaly.    Stable benign fat containing right adrenal nodule.    Other incidental findings discussed in the body of the report.    This report was flagged in Epic as abnormal.      Electronically signed by: Dayne Marquez MD  Date:    12/10/2023  Time:    22:09               Narrative:    EXAMINATION:  CT CHEST ABDOMEN PELVIS WITH IV CONTRAST (XPD)    CLINICAL HISTORY:  Sepsis;    TECHNIQUE:  Low dose axial images, sagittal and coronal reformations were obtained from the thoracic inlet to the pubic symphysis following the IV administration of 100 mL of Omnipaque 350 .  Oral contrast was not given.    COMPARISON:  CT abdomen pelvis, 08/26/2023.    FINDINGS:  Evaluation is limited by extensive streak artifact due to the patient's arms overlying the field of view.  Exam quality also limited by motion and poor bolus timing.    Chest:    Base of the neck is negative for acute finding.  There is a 1.2 cm hypodensity in the  right lobe of the thyroid gland, too small for dedicated follow-up.    Thoracic aorta is normal in caliber with mild calcific atherosclerosis.  No dissection.    Heart size is normal.  Mild coronary artery calcifications.  No pericardial effusion.    Evaluation of the pulmonary parenchyma limited by motion.  Trachea and proximal airways are patent.  There is prominent bibasilar subsegmental atelectasis, right side worse than left, mildly worse when compared with the prior study.  Trace pleural effusions cannot be excluded, noting that evaluation of this region is limited by motion.  Superimposed small areas of consolidation in the right lung base also difficult to exclude secondary to motion and streak artifact in this region.    Otherwise, no sizable pleural effusion.  No pneumothorax.  No bulky mediastinal lymphadenopathy.    Abdomen:    Liver is stable and negative for acute finding.  There is probable hepatic steatosis.  Detailed evaluation of the liver parenchyma significantly limited by motion and streak artifact.  Gallbladder is unremarkable allowing for motion.  No intrahepatic biliary ductal dilatation.    Spleen, adrenals, and pancreas are stable.  There is a fat density lesion in the right adrenal gland, likely a benign myelolipoma, stable.  Detailed evaluation of the pancreatic parenchyma limited by motion and streak artifact.    Kidneys are stable.  No hydronephrosis.  Evaluation for renal stones limited by poor bolus timing and presence of contrast in the collecting system.  Probable small cyst in the lower pole of the right kidney.    No small bowel obstruction.  Normal appendix.  Mild stool burden in the colon.  Rectum is distended up to 7 cm in transverse width containing solid stool.  Minimal presacral edema.    No pneumoperitoneum or organized fluid collection.    No bulky retroperitoneal lymphadenopathy.  There is subtle retroperitoneal fat stranding with asymmetric enlargement of the left psoas  muscle and heterogeneous attenuation in the bilateral psoas muscles, left side worse than right.    Abdominal aorta is normal in caliber with mild calcific atherosclerosis.    Portal vein is patent.  No portal venous gas.    Pelvis:    Urinary bladder is mildly distended and otherwise unremarkable.  Rectum is distended with solid stool.  There is mild presacral edema.  Prostate is enlarged and similar to the prior study.  No significant pelvic free fluid.    Bones and soft tissues:    No aggressive osseous lesions.  Moderate degenerative changes in the thoracic spine.  Detailed evaluation of the lumbar spine significantly limited by motion and extensive streak artifact.  There is slightly worse widening of the L2-L3 disc space with resolution of previously seen vacuum phenomena at this location.  Sclerotic endplate changes at L2-L3 with minimal endplate lucencies.  Significant central canal stenosis and neural foraminal narrowing at multiple levels in the lumbar spine.  Detailed evaluation of the central canal limited by aforementioned artifact.  Central canal stenosis appears most pronounced at L4-L5.  There are paravertebral and retroperitoneal inflammatory changes centered at the level of L2-L3.  Retroperitoneal microabscess or phlegmon not excluded.  No large drainable fluid collection identified, allowing for limitations.                                       CT Head Without Contrast (Final result)  Result time 12/10/23 21:42:25      Final result by Marcial De La Rosa MD (12/10/23 21:42:25)                   Impression:      Chronic change noted, there is no evidence for superimposed acute intracranial process.      Electronically signed by: Marcial De La Rosa  Date:    12/10/2023  Time:    21:42               Narrative:    EXAMINATION:  CT HEAD WITHOUT CONTRAST    CLINICAL HISTORY:  Mental status change, unknown cause;    TECHNIQUE:  Low dose axial images were obtained through the head.  Coronal and sagittal  reformations were also performed. Contrast was not administered.    COMPARISON:  CT examination of the brain without contrast February 27, 2020    FINDINGS:  Artifact is present diminishing sensitivity of the exam.  When accounting for artifact the intracranial appearance is consistent with chronic change.  Involutional change and chronic appearing white matter change noted, focal areas of low density along the periventricular white matter, basal ganglia, and right thalamus consistent with areas of remote lacunar-type ischemic change are noted.    There is no evidence for intracranial mass, mass effect or midline shift and there is no evidence for acute intracranial hemorrhage.  Near empty sella configuration is noted.  Otherwise appropriate CSF spaces are seen at the skull base.    The mastoid air cells appear well aerated, mild paranasal sinus mucosal thickening is noted.  The osseous structures appear intact.  The orbits appear intact.                                       X-Ray Chest AP Portable (Final result)  Result time 12/10/23 20:40:16      Final result by Caren Noe MD (12/10/23 20:40:16)                   Impression:      No acute intrathoracic abnormality detected.  Slightly low lung volumes.      Electronically signed by: Caren Noe  Date:    12/10/2023  Time:    20:40               Narrative:    EXAMINATION:  AP PORTABLE CHEST    CLINICAL HISTORY:  Sepsis;    TECHNIQUE:  AP portable chest radiograph was submitted.    COMPARISON:  07/06/2021    FINDINGS:  AP portable chest radiograph demonstrates a cardiac silhouette within normal limits.  There is tortuosity and ectasia of the thoracic aorta.  There is no focal consolidation, pneumothorax, or pleural effusion.  The lung volumes are slightly low.  There is mild asymmetric elevation right hemidiaphragm.  The bones are diffusely osteopenic.                                      Medication:  Scheduled Meds:   atorvastatin  40 mg Oral Daily     ceFEPime (MAXIPIME) IVPB  2 g Intravenous Q8H    fluticasone furoate-vilanteroL  2 puff Inhalation Daily    gabapentin  600 mg Oral TID    metronidazole  500 mg Intravenous Q8H    vancomycin (VANCOCIN) IV (PEDS and ADULTS)  2,000 mg Intravenous Q24H       Infusions:      PRN Meds:  acetaminophen, albuterol, aluminum-magnesium hydroxide-simethicone, dextrose 10%, dextrose 10%, gadobutroL, glucagon (human recombinant), glucose, glucose, HYDROcodone-acetaminophen, insulin aspart U-100, melatonin, naloxone, ondansetron, sodium chloride 0.9%, Pharmacy to dose Vancomycin consult **AND** vancomycin - pharmacy to dose      Lines/Drains:       Peripheral IV - Single Lumen 12/11/23 0006 22 G Anterior;Right Shoulder (Active)   Site Assessment Clean;Dry;Intact 12/11/23 2000   Extremity Assessment Distal to IV No redness;No swelling;No warmth;No abnormal discoloration 12/11/23 2000   Line Status Infusing 12/11/23 2000   Dressing Status Dry;Clean;Intact 12/11/23 2000   Dressing Intervention Integrity maintained 12/11/23 2000   Dressing Change Due 12/14/23 12/11/23 1730   Site Change Due 12/14/23 12/11/23 1730   Reason Not Rotated Not due 12/11/23 2000   Number of days: 1            Peripheral IV - Single Lumen 12/11/23 0821 20 G;1 in Posterior;Right Hand (Active)   Site Assessment Clean;Dry;Intact 12/11/23 2000   Extremity Assessment Distal to IV No abnormal discoloration;No redness;No swelling;No warmth 12/11/23 2000   Line Status Infusing 12/11/23 2000   Dressing Status Clean;Intact;Dry 12/11/23 2000   Dressing Intervention Integrity maintained 12/11/23 2000   Dressing Change Due 12/14/23 12/11/23 1730   Site Change Due 12/14/23 12/11/23 1730   Reason Not Rotated Not due 12/11/23 2000   Number of days: 0            Midline Catheter Insertion/Assessment  - Single Lumen 12/12/23 0215 Left basilic vein (medial side of arm) 20g x 10cm (Active)   $ Midline Charges (Upon insertion) Bedside Insertion Performed Pt > 3 Years Old;Midline  "Catheter (Supply);Ultrasound Guide for Vascular Access 12/12/23 0230   Site Assessment Clean;Dry;Intact;No redness;No swelling 12/12/23 0230   IV Device Securement catheter securement device 12/12/23 0230   Line Status Blood return noted;Flushed;Saline locked 12/12/23 0230   Dressing Type CHG impregnated dressing/sponge;Central line dressing 12/12/23 0230   Dressing Status Clean;Intact;Dry 12/12/23 0230   Dressing Intervention First dressing 12/12/23 0230   Dressing Change Due 12/19/23 12/12/23 0230   Site Change Due 01/10/24 12/12/23 0230   Reason Not Rotated Not due 12/12/23 0230   Number of days: 0            Urethral Catheter 12/11/23 0100 Silicone 16 Fr. (Active)   Site Assessment Clean;Intact 12/12/23 0037   Collection Container Urimeter 12/12/23 0037   Securement Method secured to top of thigh w/ adhesive device 12/11/23 1827   Catheter Care Performed yes 12/11/23 1827   Reason for Continuing Urinary Catheterization Urinary retention 12/11/23 1827   CAUTI Prevention Bundle Securement Device in place with 1" slack;Intact seal between catheter & drainage tubing;Drainage bag/urimeter off the floor;Sheeting clip in use;No dependent loops or kinks;Drainage bag/urimeter not overfilled (<2/3 full);Drainage bag/urimeter below bladder 12/11/23 1827   Output (mL) 200 mL 12/12/23 0037   Number of days: 1       ASSESSMENT/PLAN:     A 73M w/ hx CVA, CKD3, HTN, DM2, and COPD who presents with AMS likely 2/2 underling sepsis as well as progressive back pain and inability to ambulate for the past 2 weeks due to progressive L2-L3 osteodiscitis and presumed mechanical instability.      CT shows lumbar spondylosis and progressive errosive changes at L2-L3   MRI with lumbar spondylosis 2/2 multilevel of DDD and L2-L3 osteodiscitis wrose at L2-L4 with moderate to severe stenosis as well as L psoas abscess.     Infection markers elevated: , CRP >120. Bcx negative thus far. He already on broad spectrum abx       Tentative  " plan for L2-pelvis decompression and fusion on 12/15/23  MRI w/wo C and T spine   Please document PVR  Continue sepsis work up per primary team  ID consultation and continue broad spec abx   Please trend infection markers   DVT US BLE   Please document preoperative risk assessment  and stratification\  OK for DVT ppx with SQH till Thursday evening   NSGY will follow  Further care per primary team     D/w Dr.Kalyvas Suzan Traylor MD  NSGY

## 2023-12-12 NOTE — HPI
Mr. Pate is a 74 yo male with PMH of CVA, CKD3, HTN, DM2, COPD who presents with increasing lethargy and confusion.     Pt remains altered during assessment, history gathered per family at bedside and chart review. Per chart review, pt reported ~3 weeks of low back pain, as well as a fall leading up to presentation. Following the fall, pt reported lower extremity weakness and urinary retention. Daughter at bedside states that pt has not had any surgical procedures or injections recently, but was treated with pain management in 2019 and had lumbar and cervical injections. Pt is retired, but was a  previously. Family denied him having pets, denied adventurous hobbies, denied raw food intake. Daughter reported recent ER admission for abdominal pain. States pt required medication for a parasite that improved his symptoms. Upon chart review, appears pt presented in August with abdominal pain, CT AP was negative for acute findings. Pt was discharged and followed up with PCP shortly after and stated his symptoms had improved.     On admission, pt was febrile to 102, and tachycardic, with increasing leukocytosis, 13->16k, stable anemia, elevated sed/CRP: >120, 375, procal 1.37. blood cultures negative on admission. MRI lumbar spine noting discitis/osteomyelitis at L2-3 with left psoas abscess (2.2cm), as well as early osteo L3-4, noting difficult study and epidural abscess was difficult to exclude. CT head without evidence of superimposed acute intracranial process. MRI brain, cervical spine, and thoracic spine pending.     NSGY following and recommended IR consult for potential psoas abscess drainage and culture. Planning L2-pelvis decompression and fusion on 12/15/23.  IR planning CT guided aspiration/drainage of psoas abscess on either 12/12 or 12/13.     Pt is currently on vanc and cefepime. ID was consulted for antibiotic recs.

## 2023-12-12 NOTE — CARE UPDATE
RAPID RESPONSE NURSE PROACTIVE ROUNDING NOTE       Time of Visit:     Admit Date: 12/10/2023  LOS: 1  Code Status: Full Code   Date of Visit: 2023  : 1950  Age: 73 y.o.  Sex: male  Race: Black or   Bed: 959/959 A:   MRN: [id number]  Was the patient discharged from an ICU this admission? No   Was the patient discharged from a PACU within last 24 hours? No   Did the patient receive conscious sedation/general anesthesia in last 24 hours? No  Was the patient in the ED within the past 24 hours? Yes  Was the patient on NIPPV within the past 24 hours? No   Attending Physician: Krystian Reed MD  Primary Service: [facility] 3   Time spent at the bedside: > 60 min    SITUATION    Notified by [employer] patient alert.  Reason for alert: tachycardia  Called to evaluate the patient for Dysrythmia    BACKGROUND     Why is the patient in the hospital?: Severe sepsis    Patient has a past medical history of Anemia, Anticoagulant long-term use, Basal ganglia hemorrhage, Benign hypertension with CKD (chronic kidney disease) stage III, Cataract, Chronic idiopathic gout of multiple sites, Chronic kidney disease, stage 3, COPD (chronic obstructive pulmonary disease), Erectile dysfunction, Gout, Hemorrhoids without complication, Hyperlipidemia, Morbid obesity, Obstructive sleep apnea on CPAP, Reactive airway disease without complication, Stroke, Thalamic infarct, acute (right), and 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    Last Vitals:  Temp: 101.3 °F (38.5 °C) ( 2345)  Pulse: 111 (4)  Resp: 20 (4)  BP: 154/83 ( 2151)  SpO2: 97 % (4)    24 Hours Vitals Range:  Temp:  [96.7 °F (35.9 °C)-101.3 °F (38.5 °C)]   Pulse:  []   Resp:  [16-30]   BP: (120-164)/(66-92)   SpO2:  [94 %-100 %]     Labs:  Recent Labs     12/10/23  2006 12/11/23  0723  2108   WBC 12.74* 11.86 16.33*   HGB 11.0* 11.0* 11.1*   HCT 33.5* 33.5* 33.5*    264 281       Recent Labs     " 12/10/23  2006 12/11/23  0722 12/11/23  2108    138 136   K 4.3 4.4 4.2    102 102   CO2 26 26 25   BUN 27* 23 21   CREATININE 1.3 1.1 1.2   * 99 99   PHOS  --  3.9  --    MG 1.5* 1.6 1.6        No results for input(s): "PH", "PCO2", "PO2", "HCO3", "POCSATURATED", "BE" in the last 72 hours.     ASSESSMENT    Physical Exam  Constitutional:       Appearance: He is ill-appearing.   Cardiovascular:      Rate and Rhythm: Regular rhythm. Tachycardia present.      Pulses: Normal pulses.   Pulmonary:      Effort: Tachypnea present.      Breath sounds: Decreased breath sounds and wheezing present.   Skin:     General: Skin is warm and moist.      Capillary Refill: Capillary refill takes 2 to 3 seconds.   Neurological:      Mental Status: He is alert. He is disoriented.      GCS: GCS eye subscore is 4. GCS verbal subscore is 2. GCS motor subscore is 5.   Psychiatric:         Attention and Perception: He is inattentive.         Behavior: Behavior is agitated.         Judgment: Judgment is impulsive.       /84 (108)  RR 26, SpO2 96% on room air  Temp 101.3 axillary      Patient lying in bed, HOB up at 45 degrees. Patient is awake and alert and will look at you when you speak to him/call his name, but does not follow commands or respond to questions. Occasionally garbled speech/groans of pain with repositioning, otherwise not speaking. Restless and agitated in bed, appears uncomfortable.    Currently unable to swallow anything safely- cannot follow directions well enough to attempt to swallow water/juice, even with pt's daughter at bedside attempting to help.    INTERVENTIONS    The patient was seen for Cardiac problem. Staff concerns included tachycardia. The following interventions were performed: BMP, CBC, Magnesium, portable chest x-ray, EKG, continuous cardiac monitoring continued, continuous pulse ox monitoring , and procal, flu/RSV/covid swab, one-time levalbuterol breathing treatment " and 500cc LR bolus.    EKG shows sinus tachycardia    Dr Lovelace with [facility] medicine to bedside    PRN tylenol order changed by MD to per rectum d/t current inability to swallow, given by bedside RN  500cc bolus initiated by RRN    Labs drawn and sent, provider to order repeat blood cultures. Patient due to get 1st dose IV vancomycin at 2300    Per patient's daughter at bedside reports patient has worn CPAP at night for the last 10+ years, MD to order Q CPAP    RECOMMENDATIONS    Strict NPO  Monitor and record I&O  Continue to monitor VS, neuro status closely  Follow up with labs    PROVIDER ESCALATION    Yes/No  Yes    Orders received and case discussed with Dr. Lovelace with [facility] medicine .    Disposition: Remain in room 959.    FOLLOW-UP    bedside RNLeti, charge RN Rossi  updated on plan of care. Instructed to call the Rapid Response NurseELVIS, RN at [phone] for additional questions or concerns.

## 2023-12-12 NOTE — HOSPITAL COURSE
Mr. Pate was admitted for abdominal pain and worsening mental status, and was found to have a collection in his left psoas muscle, diskitis, and osteomyelitis in the L2-L3 region.  He was placed on vancomycin and meropenem for continuing decline in mental status.  Interventional Radiology took a core biopsy of infected disc, but were unable to aspirate anything from the left psoas collection. Due to his poor mental status, an NG tube and tube feedings were ordered to assist with his nutrition status. On 12/15 he returned to his mental baseline and was alert and having conversations prior to an L1-L4 fusion and decompression with neurosurgery. Was admitted to [facility] for post operative monitoring following a L1-L4 segmental posterolateral fusion, L2-L4 laminectomy and L2-L3 TLIF for L2-L4 discitis/osteomyelitis on 12/15: Went for CTA because of lethargy since surgery. Did not tolerate procedure, returned to [facility]. Sedated and intubated for MRI pan spine, as patient had post surgical changes in exam. Continuing Vanc and Deo. MRI with stable findings except for typical post-operative findings. No further surgical plans per NSGY. Provigil given to improve wakefulness, will start daily. Extubated 11/17/2023. 12/19 PICC consulted placed for long-term IV antibiotic administration. Lasix 40mg IV given for BUE swelling. US extremities ordered to rule out DVT. Showed superficial thrombophlebitis of the right cephalic vein.  Developed persistent acute transaminitis noted on 12/21 in hepatocellular pattern.  CT abdomen and ultrasound Doppler negative for any inexplicable hepatobiliary pathology w/ neg viral hep panel. Hb dropped to 6s on 12/25, got 1 prbc.  Hemoglobin dropped again, improved with another when PRBC.  Started on IV diuresis for some upper extremity global edema with improvement

## 2023-12-13 ENCOUNTER — ANESTHESIA (OUTPATIENT)
Dept: INTERVENTIONAL RADIOLOGY/VASCULAR | Facility: HOSPITAL | Age: 73
DRG: 853 | End: 2023-12-13
Payer: MEDICARE

## 2023-12-13 PROBLEM — M86.9 OSTEOMYELITIS: Status: ACTIVE | Noted: 2023-12-13

## 2023-12-13 PROBLEM — K68.12 PSOAS MUSCLE ABSCESS: Status: ACTIVE | Noted: 2023-12-13

## 2023-12-13 PROBLEM — M54.9 BACK PAIN: Status: ACTIVE | Noted: 2023-12-13

## 2023-12-13 PROBLEM — G93.40 ENCEPHALOPATHY ACUTE: Status: ACTIVE | Noted: 2023-12-13

## 2023-12-13 PROBLEM — M46.40 DISCITIS: Status: ACTIVE | Noted: 2023-12-13

## 2023-12-13 PROBLEM — I50.23 ACUTE ON CHRONIC SYSTOLIC HEART FAILURE: Status: ACTIVE | Noted: 2023-12-13

## 2023-12-13 PROBLEM — K52.89 STERCORAL COLITIS: Status: ACTIVE | Noted: 2023-12-13

## 2023-12-13 PROBLEM — E46 MALNUTRITION: Status: ACTIVE | Noted: 2023-12-13

## 2023-12-13 LAB
ALBUMIN SERPL BCP-MCNC: 1.5 G/DL (ref 3.5–5.2)
ALLENS TEST: ABNORMAL
ALP SERPL-CCNC: 70 U/L (ref 55–135)
ALT SERPL W/O P-5'-P-CCNC: 26 U/L (ref 10–44)
AMMONIA PLAS-SCNC: 31 UMOL/L (ref 10–50)
ANION GAP SERPL CALC-SCNC: 9 MMOL/L (ref 8–16)
AST SERPL-CCNC: 32 U/L (ref 10–40)
BASOPHILS # BLD AUTO: 0.02 K/UL (ref 0–0.2)
BASOPHILS NFR BLD: 0.1 % (ref 0–1.9)
BILIRUB SERPL-MCNC: 1.4 MG/DL (ref 0.1–1)
BUN SERPL-MCNC: 24 MG/DL (ref 8–23)
CALCIUM SERPL-MCNC: 9.2 MG/DL (ref 8.7–10.5)
CHLORIDE SERPL-SCNC: 103 MMOL/L (ref 95–110)
CO2 SERPL-SCNC: 25 MMOL/L (ref 23–29)
CREAT SERPL-MCNC: 1.2 MG/DL (ref 0.5–1.4)
DELSYS: ABNORMAL
DIFFERENTIAL METHOD BLD: ABNORMAL
EOSINOPHIL # BLD AUTO: 0 K/UL (ref 0–0.5)
EOSINOPHIL NFR BLD: 0.2 % (ref 0–8)
ERYTHROCYTE [DISTWIDTH] IN BLOOD BY AUTOMATED COUNT: 14.3 % (ref 11.5–14.5)
EST. GFR  (NO RACE VARIABLE): >60 ML/MIN/1.73 M^2
GLUCOSE SERPL-MCNC: 97 MG/DL (ref 70–110)
GRAM STN SPEC: NORMAL
GRAM STN SPEC: NORMAL
HCO3 UR-SCNC: 25.3 MMOL/L (ref 24–28)
HCT VFR BLD AUTO: 28.3 % (ref 40–54)
HCT VFR BLD CALC: 29 %PCV (ref 36–54)
HGB BLD-MCNC: 9.4 G/DL (ref 14–18)
IMM GRANULOCYTES # BLD AUTO: 0.11 K/UL (ref 0–0.04)
IMM GRANULOCYTES NFR BLD AUTO: 0.7 % (ref 0–0.5)
INR PPP: 1.1 (ref 0.8–1.2)
LACTATE SERPL-SCNC: 1.3 MMOL/L (ref 0.5–2.2)
LYMPHOCYTES # BLD AUTO: 0.8 K/UL (ref 1–4.8)
LYMPHOCYTES NFR BLD: 5.3 % (ref 18–48)
MAGNESIUM SERPL-MCNC: 2.3 MG/DL (ref 1.6–2.6)
MCH RBC QN AUTO: 30 PG (ref 27–31)
MCHC RBC AUTO-ENTMCNC: 33.2 G/DL (ref 32–36)
MCV RBC AUTO: 90 FL (ref 82–98)
MONOCYTES # BLD AUTO: 1.6 K/UL (ref 0.3–1)
MONOCYTES NFR BLD: 10.9 % (ref 4–15)
NEUTROPHILS # BLD AUTO: 12.4 K/UL (ref 1.8–7.7)
NEUTROPHILS NFR BLD: 82.8 % (ref 38–73)
NRBC BLD-RTO: 0 /100 WBC
PCO2 BLDA: 33.4 MMHG (ref 35–45)
PH SMN: 7.49 [PH] (ref 7.35–7.45)
PHOSPHATE SERPL-MCNC: 3.2 MG/DL (ref 2.7–4.5)
PLATELET # BLD AUTO: 284 K/UL (ref 150–450)
PMV BLD AUTO: 10.7 FL (ref 9.2–12.9)
PO2 BLDA: 76 MMHG (ref 40–60)
POC BE: 2 MMOL/L
POC IONIZED CALCIUM: 1.3 MMOL/L (ref 1.06–1.42)
POC SATURATED O2: 96 % (ref 95–100)
POC TCO2: 26 MMOL/L (ref 24–29)
POCT GLUCOSE: 106 MG/DL (ref 70–110)
POCT GLUCOSE: 117 MG/DL (ref 70–110)
POCT GLUCOSE: 128 MG/DL (ref 70–110)
POTASSIUM BLD-SCNC: 4 MMOL/L (ref 3.5–5.1)
POTASSIUM SERPL-SCNC: 3.9 MMOL/L (ref 3.5–5.1)
PROT SERPL-MCNC: 6.2 G/DL (ref 6–8.4)
PROTHROMBIN TIME: 11.3 SEC (ref 9–12.5)
RBC # BLD AUTO: 3.13 M/UL (ref 4.6–6.2)
SAMPLE: ABNORMAL
SITE: ABNORMAL
SODIUM BLD-SCNC: 137 MMOL/L (ref 136–145)
SODIUM SERPL-SCNC: 137 MMOL/L (ref 136–145)
TROPONIN I SERPL DL<=0.01 NG/ML-MCNC: 0.06 NG/ML (ref 0–0.03)
TSH SERPL DL<=0.005 MIU/L-ACNC: 1.79 UIU/ML (ref 0.4–4)
URATE SERPL-MCNC: 8.5 MG/DL (ref 3.4–7)
VANCOMYCIN TROUGH SERPL-MCNC: 22 UG/ML (ref 10–22)
WBC # BLD AUTO: 14.93 K/UL (ref 3.9–12.7)

## 2023-12-13 PROCEDURE — 87206 SMEAR FLUORESCENT/ACID STAI: CPT

## 2023-12-13 PROCEDURE — 94761 N-INVAS EAR/PLS OXIMETRY MLT: CPT | Mod: XB

## 2023-12-13 PROCEDURE — D9220A PRA ANESTHESIA: ICD-10-PCS | Mod: ANES,,, | Performed by: STUDENT IN AN ORGANIZED HEALTH CARE EDUCATION/TRAINING PROGRAM

## 2023-12-13 PROCEDURE — D9220A PRA ANESTHESIA: Mod: CRNA,,, | Performed by: STUDENT IN AN ORGANIZED HEALTH CARE EDUCATION/TRAINING PROGRAM

## 2023-12-13 PROCEDURE — D9220A PRA ANESTHESIA: Mod: ANES,,, | Performed by: STUDENT IN AN ORGANIZED HEALTH CARE EDUCATION/TRAINING PROGRAM

## 2023-12-13 PROCEDURE — 85025 COMPLETE CBC W/AUTO DIFF WBC: CPT

## 2023-12-13 PROCEDURE — 27000221 HC OXYGEN, UP TO 24 HOURS

## 2023-12-13 PROCEDURE — 83735 ASSAY OF MAGNESIUM: CPT

## 2023-12-13 PROCEDURE — 82140 ASSAY OF AMMONIA: CPT

## 2023-12-13 PROCEDURE — 36415 COLL VENOUS BLD VENIPUNCTURE: CPT | Performed by: PHYSICIAN ASSISTANT

## 2023-12-13 PROCEDURE — 99900035 HC TECH TIME PER 15 MIN (STAT)

## 2023-12-13 PROCEDURE — 85610 PROTHROMBIN TIME: CPT | Performed by: PHYSICIAN ASSISTANT

## 2023-12-13 PROCEDURE — D9220A PRA ANESTHESIA: ICD-10-PCS | Mod: CRNA,,, | Performed by: STUDENT IN AN ORGANIZED HEALTH CARE EDUCATION/TRAINING PROGRAM

## 2023-12-13 PROCEDURE — 63600175 PHARM REV CODE 636 W HCPCS

## 2023-12-13 PROCEDURE — 25000003 PHARM REV CODE 250: Performed by: STUDENT IN AN ORGANIZED HEALTH CARE EDUCATION/TRAINING PROGRAM

## 2023-12-13 PROCEDURE — 89051 BODY FLUID CELL COUNT: CPT

## 2023-12-13 PROCEDURE — 0SB23ZX EXCISION OF LUMBAR VERTEBRAL DISC, PERCUTANEOUS APPROACH, DIAGNOSTIC: ICD-10-PCS | Performed by: RADIOLOGY

## 2023-12-13 PROCEDURE — 87205 SMEAR GRAM STAIN: CPT

## 2023-12-13 PROCEDURE — 83605 ASSAY OF LACTIC ACID: CPT

## 2023-12-13 PROCEDURE — 36415 COLL VENOUS BLD VENIPUNCTURE: CPT

## 2023-12-13 PROCEDURE — 87102 FUNGUS ISOLATION CULTURE: CPT

## 2023-12-13 PROCEDURE — 87070 CULTURE OTHR SPECIMN AEROBIC: CPT

## 2023-12-13 PROCEDURE — 99233 SBSQ HOSP IP/OBS HIGH 50: CPT | Mod: ,,, | Performed by: INTERNAL MEDICINE

## 2023-12-13 PROCEDURE — 82962 GLUCOSE BLOOD TEST: CPT

## 2023-12-13 PROCEDURE — 11000001 HC ACUTE MED/SURG PRIVATE ROOM

## 2023-12-13 PROCEDURE — 36415 COLL VENOUS BLD VENIPUNCTURE: CPT | Performed by: STUDENT IN AN ORGANIZED HEALTH CARE EDUCATION/TRAINING PROGRAM

## 2023-12-13 PROCEDURE — 63600175 PHARM REV CODE 636 W HCPCS: Performed by: STUDENT IN AN ORGANIZED HEALTH CARE EDUCATION/TRAINING PROGRAM

## 2023-12-13 PROCEDURE — 87116 MYCOBACTERIA CULTURE: CPT

## 2023-12-13 PROCEDURE — 25000003 PHARM REV CODE 250

## 2023-12-13 PROCEDURE — 82803 BLOOD GASES ANY COMBINATION: CPT

## 2023-12-13 PROCEDURE — 80202 ASSAY OF VANCOMYCIN: CPT | Performed by: STUDENT IN AN ORGANIZED HEALTH CARE EDUCATION/TRAINING PROGRAM

## 2023-12-13 PROCEDURE — 84550 ASSAY OF BLOOD/URIC ACID: CPT

## 2023-12-13 PROCEDURE — 87075 CULTR BACTERIA EXCEPT BLOOD: CPT

## 2023-12-13 PROCEDURE — 80053 COMPREHEN METABOLIC PANEL: CPT

## 2023-12-13 PROCEDURE — 84443 ASSAY THYROID STIM HORMONE: CPT

## 2023-12-13 PROCEDURE — 84100 ASSAY OF PHOSPHORUS: CPT

## 2023-12-13 RX ORDER — LIDOCAINE HYDROCHLORIDE 20 MG/ML
INJECTION, SOLUTION EPIDURAL; INFILTRATION; INTRACAUDAL; PERINEURAL
Status: DISCONTINUED | OUTPATIENT
Start: 2023-12-13 | End: 2023-12-13

## 2023-12-13 RX ORDER — FENTANYL CITRATE 50 UG/ML
25 INJECTION, SOLUTION INTRAMUSCULAR; INTRAVENOUS EVERY 5 MIN PRN
Status: DISCONTINUED | OUTPATIENT
Start: 2023-12-13 | End: 2023-12-17

## 2023-12-13 RX ORDER — PSEUDOEPHEDRINE/ACETAMINOPHEN 30MG-500MG
100 TABLET ORAL ONCE
Status: COMPLETED | OUTPATIENT
Start: 2023-12-13 | End: 2023-12-14

## 2023-12-13 RX ORDER — DEXAMETHASONE SODIUM PHOSPHATE 4 MG/ML
INJECTION, SOLUTION INTRA-ARTICULAR; INTRALESIONAL; INTRAMUSCULAR; INTRAVENOUS; SOFT TISSUE
Status: DISCONTINUED | OUTPATIENT
Start: 2023-12-13 | End: 2023-12-13

## 2023-12-13 RX ORDER — MORPHINE SULFATE 2 MG/ML
2 INJECTION, SOLUTION INTRAMUSCULAR; INTRAVENOUS EVERY 4 HOURS PRN
Status: DISCONTINUED | OUTPATIENT
Start: 2023-12-13 | End: 2023-12-24

## 2023-12-13 RX ORDER — ONDANSETRON 2 MG/ML
INJECTION INTRAMUSCULAR; INTRAVENOUS
Status: DISCONTINUED | OUTPATIENT
Start: 2023-12-13 | End: 2023-12-13

## 2023-12-13 RX ORDER — ROCURONIUM BROMIDE 10 MG/ML
INJECTION, SOLUTION INTRAVENOUS
Status: DISCONTINUED | OUTPATIENT
Start: 2023-12-13 | End: 2023-12-13

## 2023-12-13 RX ORDER — SODIUM CHLORIDE 0.9 % (FLUSH) 0.9 %
3 SYRINGE (ML) INJECTION
Status: DISCONTINUED | OUTPATIENT
Start: 2023-12-13 | End: 2023-12-18

## 2023-12-13 RX ORDER — SODIUM CHLORIDE, SODIUM LACTATE, POTASSIUM CHLORIDE, CALCIUM CHLORIDE 600; 310; 30; 20 MG/100ML; MG/100ML; MG/100ML; MG/100ML
INJECTION, SOLUTION INTRAVENOUS CONTINUOUS
Status: ACTIVE | OUTPATIENT
Start: 2023-12-13 | End: 2023-12-13

## 2023-12-13 RX ORDER — HEPARIN SODIUM 5000 [USP'U]/ML
5000 INJECTION, SOLUTION INTRAVENOUS; SUBCUTANEOUS EVERY 8 HOURS
Status: COMPLETED | OUTPATIENT
Start: 2023-12-13 | End: 2023-12-13

## 2023-12-13 RX ORDER — HEPARIN SODIUM 5000 [USP'U]/ML
5000 INJECTION, SOLUTION INTRAVENOUS; SUBCUTANEOUS EVERY 8 HOURS
Status: DISCONTINUED | OUTPATIENT
Start: 2023-12-13 | End: 2023-12-13

## 2023-12-13 RX ORDER — PROPOFOL 10 MG/ML
VIAL (ML) INTRAVENOUS
Status: DISCONTINUED | OUTPATIENT
Start: 2023-12-13 | End: 2023-12-13

## 2023-12-13 RX ORDER — GABAPENTIN 300 MG/1
600 CAPSULE ORAL 3 TIMES DAILY
Status: DISCONTINUED | OUTPATIENT
Start: 2023-12-13 | End: 2023-12-17

## 2023-12-13 RX ORDER — FENTANYL CITRATE 50 UG/ML
INJECTION, SOLUTION INTRAMUSCULAR; INTRAVENOUS
Status: DISCONTINUED | OUTPATIENT
Start: 2023-12-13 | End: 2023-12-13

## 2023-12-13 RX ORDER — SYRING-NEEDL,DISP,INSUL,0.3 ML 29 G X1/2"
296 SYRINGE, EMPTY DISPOSABLE MISCELLANEOUS ONCE
Status: COMPLETED | OUTPATIENT
Start: 2023-12-13 | End: 2023-12-14

## 2023-12-13 RX ORDER — ALLOPURINOL 100 MG/1
100 TABLET ORAL DAILY
Status: CANCELLED | OUTPATIENT
Start: 2023-12-13

## 2023-12-13 RX ORDER — DIPHENHYDRAMINE HYDROCHLORIDE 50 MG/ML
25 INJECTION, SOLUTION INTRAMUSCULAR; INTRAVENOUS EVERY 6 HOURS PRN
Status: DISCONTINUED | OUTPATIENT
Start: 2023-12-13 | End: 2023-12-18

## 2023-12-13 RX ORDER — HYDROMORPHONE HYDROCHLORIDE 1 MG/ML
0.2 INJECTION, SOLUTION INTRAMUSCULAR; INTRAVENOUS; SUBCUTANEOUS EVERY 5 MIN PRN
Status: DISCONTINUED | OUTPATIENT
Start: 2023-12-13 | End: 2023-12-17

## 2023-12-13 RX ADMIN — LIDOCAINE HYDROCHLORIDE 100 MG: 20 INJECTION, SOLUTION EPIDURAL; INFILTRATION; INTRACAUDAL; PERINEURAL at 04:12

## 2023-12-13 RX ADMIN — FENTANYL CITRATE 100 MCG: 50 INJECTION, SOLUTION INTRAMUSCULAR; INTRAVENOUS at 04:12

## 2023-12-13 RX ADMIN — SUGAMMADEX 300 MG: 100 INJECTION, SOLUTION INTRAVENOUS at 05:12

## 2023-12-13 RX ADMIN — SODIUM CHLORIDE: 0.9 INJECTION, SOLUTION INTRAVENOUS at 04:12

## 2023-12-13 RX ADMIN — HEPARIN SODIUM 5000 UNITS: 5000 INJECTION INTRAVENOUS; SUBCUTANEOUS at 10:12

## 2023-12-13 RX ADMIN — ROCURONIUM BROMIDE 50 MG: 10 INJECTION INTRAVENOUS at 04:12

## 2023-12-13 RX ADMIN — SODIUM CHLORIDE, SODIUM LACTATE, POTASSIUM CHLORIDE, AND CALCIUM CHLORIDE: 600; 310; 30; 20 INJECTION, SOLUTION INTRAVENOUS at 10:12

## 2023-12-13 RX ADMIN — DEXAMETHASONE SODIUM PHOSPHATE 4 MG: 4 INJECTION, SOLUTION INTRAMUSCULAR; INTRAVENOUS at 04:12

## 2023-12-13 RX ADMIN — PROPOFOL 70 MG: 10 INJECTION, EMULSION INTRAVENOUS at 04:12

## 2023-12-13 RX ADMIN — MEROPENEM 2 G: 1 INJECTION INTRAVENOUS at 08:12

## 2023-12-13 RX ADMIN — MEROPENEM 2 G: 1 INJECTION INTRAVENOUS at 01:12

## 2023-12-13 RX ADMIN — ONDANSETRON 4 MG: 2 INJECTION INTRAMUSCULAR; INTRAVENOUS at 04:12

## 2023-12-13 RX ADMIN — ACETAMINOPHEN 650 MG: 650 SUPPOSITORY RECTAL at 12:12

## 2023-12-13 RX ADMIN — SODIUM CHLORIDE, SODIUM LACTATE, POTASSIUM CHLORIDE, AND CALCIUM CHLORIDE: 600; 310; 30; 20 INJECTION, SOLUTION INTRAVENOUS at 08:12

## 2023-12-13 NOTE — ASSESSMENT & PLAN NOTE
This patient does have evidence of infective focus  My overall impression is sepsis.  Source:  Psoas Abscess, Osteomyelitis, Discitis, Possible Epidural abcess  Antibiotics given-   Antibiotics (72h ago, onward)      Start     Stop Route Frequency Ordered    12/12/23 1700  meropenem (MERREM) 2 g in sodium chloride 0.9% 100 mL IVPB         -- IV Every 8 hours (non-standard times) 12/12/23 1537    12/11/23 2300  vancomycin 2 g in dextrose 5 % 500 mL IVPB         -- IV Every 24 hours (non-standard times) 12/11/23 0720    12/11/23 0752  vancomycin - pharmacy to dose  (vancomycin IVPB (PEDS and ADULTS))        See Hyperspace for full Linked Orders Report.    -- IV pharmacy to manage frequency 12/11/23 0654          Latest lactate reviewed-  Recent Labs   Lab 12/12/23  1653 12/12/23  2206 12/13/23  0313   LACTATE 1.8 1.6 1.3       Organ dysfunction indicate    Will Not start Pressors as bp is greater than 65.   Source control achieved by: abx ands possible interventions, pending nsgy and IR recs    CTAP/MRI Lumbar spine showing L psoas collection (2.2 cm), and L2-L3 diskitis/osteomyelitis    - On Vanc, Meropenem  - Continues to have fever and tachycardia despite tylenol administration. IR planned to drain psoas collection today to help with source control.  - NSGY consulted planning on L2 to pelvis decompression on 12/15.   - ID consulted and following  - gentle IVF given HFrEF

## 2023-12-13 NOTE — ASSESSMENT & PLAN NOTE
No acute processes on head CT or brain MRI  Repeat MRI reviewed by Vascular Neuro, likely artifact.

## 2023-12-13 NOTE — TREATMENT PLAN
Procedural Risk Assessment    Revised Cardiac Risk Index   High risk surgery: No  History of ischemic heart disease: No  History of congestive heart failure: No  History of stroke: Yes  Insulin dependent diabetes: No  Cr > 2: No  1 point, class II risk, 6.0% risk of cardiac event 2014 ACC/AHA Perioperative Guidelines  Known or risk factors for CAD: Yes  High risk of MACE: No  Functional capacity < 4 METs: Yes     Pt has undergone cardiac risk stratification workup; echo - reduced EF, no history of decompensated heart failure with hypoxia, pulmonary edema, etc. EKG - sinus tachycardia. Holding medications appropriately: ASA held since admit, will be restarted as soon as possible post procedure to minimize risk. Beta blockers have been held since admission on 12/10. Would not restart given active sepsis and risk of restarting in perioperative period. The remaining cardiac meds can be held as needed but should also be restarted after the procedure. Pt has 6.0% risk of MACE, risk/benefits discussed with family. Given need for source control with risk of decompensation d/t sepsis with relatively low procedural cardiac risk, patient cleared for procedure from Medicine standpoint. These recommendations follow the most current Guideline on Perioperative Cardiovascular Evaluation and Management of Patients Undergoing Noncardiac Surgery released by the ACC/AHA (JACC 2014.07.944).    Edilia Lau MD  [facility] Medicine

## 2023-12-13 NOTE — ASSESSMENT & PLAN NOTE
likely 2/2 sepsis  Worsening, no new insults on MRI brain per Stroke    Neurology consulted, appreciate recommendations

## 2023-12-13 NOTE — PROGRESS NOTES
[facility] [place])  Infectious Disease  Progress Note    Patient Name: Bj Pate Jr.  MRN: [id number]  Admission Date: 12/10/2023  Length of Stay: 2 days  Attending Physician: Krystian Reed MD  Primary Care Provider: Jose Antonio Foster MD    Isolation Status: No active isolations  Assessment/Plan:      ID  Discitis  See AP below    Acute osteomyelitis of lumbar spine  74 yo male with PMH of CVA, CKD3, HTN, DM2, COPD who presents with increasing lethargy and confusion, who sustained a fall, and then reported urinary retention, lower extremity weakness.     MRI lumbar spine noting L2-3 discitis and possible L3-4 osteo, as well as left psoas abscess (2cm). Unable to exclude epidural abscess. MRI of brain, cervical spine, thoracic spine limited study due to motion, vascular neurology though noting low suspicion for infarct based on findings. Blood cultures from admit are negative thus far.     Pt remains febrile, tachycardic. White count slightly decreased to 15k today. Continues to be altered. IR planning psoas abscess aspiration later today, NSGY planning lumbar decompression and fusion tentatively tomorrow. Pt escalated to meropenem yesterday and continues on vanc.     Recommendations:  - continue vanc. Inpatient pharm to dose. Trough goal 15-20  - continue meropenem 2g IV q8hr.   - recommend IR aspiration and surgical intervention as soon as able to achieve source control   - will follow culture data and tailor antibiotics as needed   - please send specimens for path, gram stain, aerobic, anaerobic, AFB and fungal cultures   - will follow surgical plans   - pt seen and plan reviewed with ID staff. ID will follow.     GI  Psoas muscle abscess  See AP above            Thank you for your consult. I will follow-up with patient. Please contact us if you have any additional questions.    Christiano Ashley, TORI  Infectious Disease  [facility] - Neurosurgery ([facility])    Subjective:     Principal  Problem:Severe sepsis    HPI: Mr. Pate is a 72 yo male with PMH of CVA, CKD3, HTN, DM2, COPD who presents with increasing lethargy and confusion.     Pt remains altered during assessment, history gathered per family at bedside and chart review. Per chart review, pt reported ~3 weeks of low back pain, as well as a fall leading up to presentation. Following the fall, pt reported lower extremity weakness and urinary retention. Daughter at bedside states that pt has not had any surgical procedures or injections recently, but was treated with pain management in 2019 and had lumbar and cervical injections. Pt is retired, but was a  previously. Family denied him having pets, denied adventurous hobbies, denied raw food intake. Daughter reported recent ER admission for abdominal pain. States pt required medication for a parasite that improved his symptoms. Upon chart review, appears pt presented in August with abdominal pain, CT AP was negative for acute findings. Pt was discharged and followed up with PCP shortly after and stated his symptoms had improved.     On admission, pt was febrile to 102, and tachycardic, with increasing leukocytosis, 13->16k, stable anemia, elevated sed/CRP: >120, 375, procal 1.37. blood cultures negative on admission. MRI lumbar spine noting discitis/osteomyelitis at L2-3 with left psoas abscess (2.2cm), as well as early osteo L3-4, noting difficult study and epidural abscess was difficult to exclude. CT head without evidence of superimposed acute intracranial process. MRI brain, cervical spine, and thoracic spine pending.     NSGY following and recommended IR consult for potential psoas abscess drainage and culture. Planning L2-pelvis decompression and fusion on 12/15/23.  IR planning CT guided aspiration/drainage of psoas abscess on either 12/12 or 12/13.     Pt is currently on vanc and cefepime. ID was consulted for antibiotic recs.       Interval hx:     Seen at bedside with  staff MD. Continues to fever. Continues with altered mentation. Bcx negative. Vascular neurology noting that findings on MRI brain is likely artifact, and have low suspicion for infarct. Planning to go to IR today. Tentatively planning L2-pelvis decompression and fusion on 12/15.      Past Medical History:   Diagnosis Date    Acute on chronic systolic heart failure 12/13/2023    Anemia 06/04/2021    Anticoagulant long-term use     Basal ganglia hemorrhage     Left basal ganglia hemorrhage with resultant right-sided hemiparesis which has resolved.     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        Past Surgical History:   Procedure Laterality Date    CARPAL TUNNEL RELEASE Left 2/19/2020    Procedure: RELEASE, CARPAL TUNNEL LEFT;  Surgeon: Maria Luisa Mccurdy MD;  Location: [facility] OR;  Service: Orthopedics;  Laterality: Left;    COLONOSCOPY N/A 7/28/2023    Procedure: COLONOSCOPY;  Surgeon: Jaylene Alvarado MD;  Location: [facility] ENDO;  Service: Endoscopy;  Laterality: N/A;    EPIDURAL STEROID INJECTION N/A 5/2/2019    Procedure: Injection, Steroid, Epidural Cervical;  Surgeon: Dariel Doan Jr., MD;  Location: [facility] ENDO;  Service: Pain Management;  Laterality: N/A;  Cervical Epidural Steroid Injection     19998    Arrive @ 1130; ASA; Check BG    EPIDURAL STEROID INJECTION Bilateral 5/29/2019    Procedure: Lumbar Medial Branch Blocks;  Surgeon: Dariel Doan Jr., MD;  Location: [facility]  ENDO;  Service: Pain Management;  Laterality: Bilateral;  Bilateral Lumbar Medial Branch Blocks L3, L4, L5    [id number]  [id number]    Attive @ 1030 (11 arrival request); NO Sedation; ASA; Check BG    EPIDURAL STEROID INJECTION Bilateral 7/3/2019    Procedure: Lumbar Medial Branch Blocks;  Surgeon: Dariel Doan Jr., MD;  Location: [facility] ENDO;  Service: Pain Management;  Laterality: Bilateral;  Bilateral Lumbar Medial Branch Blocks L3, L4, L5    [id number]  [id number]    Arrive @ 0930; NO Sedation; ASA; Check BG    EPIDURAL STEROID INJECTION N/A 8/7/2019    Procedure: Injection, Steroid, Epidural Cervical;  Surgeon: Dariel Doan Jr., MD;  Location: [facility] ENDO;  Service: Pain Management;  Laterality: N/A;  Cervical Epidural Steroid Injection C7-T1    19674    Arrive @ 1115; Last ASA 7/30; Check BG    ESOPHAGOGASTRODUODENOSCOPY N/A 7/28/2023    Procedure: EGD (ESOPHAGOGASTRODUODENOSCOPY);  Surgeon: Jaylene Alvarado MD;  Location: [facility] ENDO;  Service: Endoscopy;  Laterality: N/A;  inst via portal    LEFT HEART CATHETERIZATION Left 9/21/2021    Procedure: Left heart cath 9am start, R rad access;  Surgeon: Dariel Conner MD;  Location: [facility] CATH LAB;  Service: Cardiology;  Laterality: Left;  RN PRE OP Covid NEGATIVE ON  9-20-21.  C A    MIDLINE CATHETER PLACEMENT  12/12/2023    NO PAST SURGERIES         Review of patient's allergies indicates:   Allergen Reactions    Tomato (solanum lycopersicum) Hives    Naproxen Hives    Shrimp Other (See Comments)       Medications:  Medications Prior to Admission   Medication Sig    acetaminophen (TYLENOL) 650 MG TbSR Take 1,300 mg by mouth every 6 (six) hours as needed (Pain).    ascorbic acid, vitamin C, (VITAMIN C) 500 MG tablet Take 500 mg by mouth once daily.    atorvastatin (LIPITOR) 40 MG tablet Take 1 tablet (40 mg total) by mouth once daily.    cyclobenzaprine (FLEXERIL) 10 MG tablet Take 10 mg by mouth 3 (three) times daily as needed for Muscle spasms.    docusate sodium  (COLACE) 100 MG capsule Take 100 mg by mouth daily as needed for Constipation.    furosemide (LASIX) 20 MG tablet Take 1 tablet (20 mg total) by mouth 2 (two) times daily.    gabapentin (NEURONTIN) 600 MG tablet TAKE 1 TABLET(600 MG) BY MOUTH THREE TIMES DAILY AS NEEDED    HYDROcodone-acetaminophen (NORCO) 5-325 mg per tablet Take 1 tablet by mouth every 6 (six) hours as needed for Pain.    PREVIDENT 5000 SENSITIVE 1.1-5 % Pste Brush teeth every other day as directed.    tamsulosin (FLOMAX) 0.4 mg Cap Take 0.4 mg by mouth once daily.    aspirin (ECOTRIN) 81 MG EC tablet Take 81 mg by mouth once daily.    blood pressure test kit-large Kit 1 Device by Misc.(Non-Drug; Combo Route) route 2 (two) times daily.    blood sugar diagnostic Strp To check BG 2 times daily, to use with insurance preferred meter    blood-glucose meter kit To check BG 2 times daily, to use with insurance preferred meter    diltiaZEM (CARDIZEM CD) 240 MG 24 hr capsule Take 1 capsule (240 mg total) by mouth once daily. (Patient not taking: Reported on 12/11/2023)    fluticasone-salmeterol diskus inhaler 500-50 mcg Inhale 1 puff into the lungs 2 (two) times daily. Controller    lancets Misc To check BG 2 times daily, to use with insurance preferred meter    PFIZER COVID BIVAL,12Y UP,,PF, 30 mcg/0.3 mL injection      Antibiotics (From admission, onward)      Start     Stop Route Frequency Ordered    12/12/23 1700  meropenem (MERREM) 2 g in sodium chloride 0.9% 100 mL IVPB         -- IV Every 8 hours (non-standard times) 12/12/23 1537    12/11/23 2300  vancomycin 2 g in dextrose 5 % 500 mL IVPB         -- IV Every 24 hours (non-standard times) 12/11/23 0720    12/11/23 0752  vancomycin - pharmacy to dose  (vancomycin IVPB (PEDS and ADULTS))        See Hyperspace for full Linked Orders Report.    -- IV pharmacy to manage frequency 12/11/23 0654          Antifungals (From admission, onward)      None          Antivirals (From admission, onward)      None              Immunization History   Administered Date(s) Administered    COVID-19, MRNA, LN-S, PF (MODERNA FULL 0.5 ML DOSE) 05/17/2022    COVID-19, MRNA, LN-S, PF (Pfizer) (Purple Cap) 09/22/2022    COVID-19, mRNA, LNP-S, PF, kim-sucrose, 30 mcg/0.3 mL (Pfizer 2023 Ages 12+) 10/12/2023    COVID-19, mRNA, LNP-S, bivalent booster, PF (Moderna Omicron)12 + YEARS 09/22/2022    COVID-19, mRNA, LNP-S, bivalent booster, PF (PFIZER OMICRON) 06/14/2023    COVID-19, vector-nr, rS-Ad26, PF (Apurva) 03/08/2021, 11/05/2021    Influenza 10/22/2012, 02/28/2014    Influenza (FLUAD) - Quadrivalent - Adjuvanted - PF *Preferred* (65+) 09/06/2023    Influenza (FLUAD) - Trivalent - Adjuvanted - PF (65+) 10/10/2018, 10/10/2018, 09/12/2019, 09/12/2019    Influenza - High Dose - PF (65 years and older) 10/23/2017, 10/23/2017    Influenza - Quadrivalent 09/23/2015    Influenza - Quadrivalent - High Dose - PF (65 years and older) 09/10/2020, 09/10/2020, 09/29/2021, 09/22/2022    Influenza - Quadrivalent - PF *Preferred* (6 months and older) 12/03/2016    Influenza - Trivalent (ADULT) 10/22/2012, 02/28/2014    Influenza Split 10/22/2012, 02/28/2014    Pneumococcal Conjugate - 13 Valent 02/19/2016, 10/10/2018, 10/10/2018    Pneumococcal Polysaccharide - 23 Valent 03/07/2017, 09/12/2019, 09/12/2019, 09/10/2020, 09/10/2020    RSVpreF (Arexvy) 10/12/2023    Tdap 02/19/2016       Family History       Problem Relation (Age of Onset)    Diabetes Paternal Grandfather    Heart disease Paternal Grandfather    Hypertension     No Known Problems Mother, Father, Sister, Brother, Maternal Aunt, Maternal Uncle, Paternal Aunt, Paternal Uncle, Maternal Grandmother, Maternal Grandfather, Paternal Grandmother          Social History     Socioeconomic History    Marital status:     Number of children: 8    Highest education level: 11th grade   Occupational History    Occupation:    Tobacco Use    Smoking status: Never    Smokeless tobacco:  Never   Substance and Sexual Activity    Alcohol use: Yes     Alcohol/week: 0.0 standard drinks of alcohol     Comment: occacionally    Drug use: No    Sexual activity: Not Currently     Social Determinants of Health     Financial Resource Strain: Low Risk  (12/12/2023)    Overall Financial Resource Strain (CARDIA)     Difficulty of Paying Living Expenses: Not very hard   Food Insecurity: No Food Insecurity (12/12/2023)    Hunger Vital Sign     Worried About Running Out of Food in the Last Year: Never true     Ran Out of Food in the Last Year: Never true   Transportation Needs: No Transportation Needs (12/12/2023)    PRAPARE - Transportation     Lack of Transportation (Medical): No     Lack of Transportation (Non-Medical): No   Physical Activity: Inactive (12/12/2023)    Exercise Vital Sign     Days of Exercise per Week: 0 days     Minutes of Exercise per Session: 0 min   Stress: Unknown (12/12/2023)    [employer] - Occupational Stress Questionnaire     Feeling of Stress : Patient refused   Social Connections: Unknown (12/12/2023)    Social Connection and Isolation Panel [NHANES]     Frequency of Communication with Friends and Family: More than three times a week     Frequency of Social Gatherings with Friends and Family: More than three times a week     Attends [employer] Services: Patient refused     Active Member of Clubs or Organizations: Patient refused     Attends Club or Organization Meetings: Patient refused     Marital Status:    Housing Stability: Unknown (12/12/2023)    Housing Stability Vital Sign     Unable to Pay for Housing in the Last Year: No     Unstable Housing in the Last Year: No     Review of Systems   Unable to perform ROS: Patient nonverbal     Objective:     Vital Signs (Most Recent):  Temp: (!) 102.3 °F (39.1 °C) (12/13/23 1205)  Pulse: (!) 118 (12/13/23 1212)  Resp: (!) 23 (12/13/23 1212)  BP: 124/68 (12/13/23 1148)  SpO2: 97 % (12/13/23 1212) Vital  Signs (24h Range):  Temp:  [97.7 °F (36.5 °C)-102.3 °F (39.1 °C)] 102.3 °F (39.1 °C)  Pulse:  [118-146] 118  Resp:  [20-32] 23  SpO2:  [92 %-100 %] 97 %  BP: (113-141)/(65-88) 124/68     Weight: 120.2 kg (265 lb)  Body mass index is 39.13 kg/m².    Estimated Creatinine Clearance: 72.3 mL/min (based on SCr of 1.2 mg/dL).     Physical Exam  Vitals reviewed.   Constitutional:       General: He is not in acute distress.     Appearance: He is ill-appearing. He is not toxic-appearing or diaphoretic.   HENT:      Nose: Nose normal.      Mouth/Throat:      Mouth: Mucous membranes are moist.      Pharynx: Oropharynx is clear.   Cardiovascular:      Rate and Rhythm: Regular rhythm. Tachycardia present.      Pulses: Normal pulses.      Heart sounds: Normal heart sounds. No murmur heard.  Pulmonary:      Effort: Pulmonary effort is normal. No respiratory distress.      Breath sounds: Normal breath sounds. No stridor.   Abdominal:      General: Abdomen is flat. There is no distension.      Palpations: Abdomen is soft.      Tenderness: There is no abdominal tenderness. There is no guarding or rebound.   Musculoskeletal:         General: Swelling present. No deformity. Normal range of motion.      Right lower leg: No edema.      Left lower leg: No edema.   Skin:     General: Skin is warm.      Findings: No lesion or rash.   Neurological:      Mental Status: He is disoriented.      Motor: Weakness present.      Gait: Gait abnormal.          Significant Labs: All pertinent labs within the past 24 hours have been reviewed.    Significant Imaging: I have reviewed all pertinent imaging results/findings within the past 24 hours.

## 2023-12-13 NOTE — PROCEDURES
Pre Op Diagnosis:  Questionable left psoas fluid  + Discitis     Post Op Diagnosis: Unkown     Procedure:  Attempted psoas aspiration + Imaged guided biopsy of L2-L3 disk space     Procedure performed by: Demarcus Back MD / Melia Morris MD      Written Informed Consent Obtained: Yes     Specimen Removed: Yes.  Core sample from disk space     Estimated Blood Loss: Minimal     Findings:      Attempted CT guided needle placement in psoas muscle revealed no fluid to aspirate.     CT guided coaxial core biopsy of L2-L3 disk space.   1 core biopsy samples were sent for culture.       Patient tolerated procedure well.        Melia Morris MD  V[facility] Fellow

## 2023-12-13 NOTE — PROGRESS NOTES
Progress Note  Neurosurgery    12/13/2023  8:20 AM    Admit Date: 12/10/2023  Pt is hospital day  LOS: 2 days     Hospital Day: 2  Post-operative Day:    POD: * No surgery date entered *  Hospital Day: 4    Active problems:  Patient Active Problem List   Diagnosis    Obstructive sleep apnea on CPAP: setting = 8    Morbid obesity with BMI of 45.0-49.9, adult    Chronic idiopathic gout of multiple sites    Chronic kidney disease, stage 3a    CKD (chronic kidney disease)    History of CVA (cerebrovascular accident)    History of cerebral parenchymal hemorrhage    Essential hypertension    Decreased spinal mobility    Decreased functional mobility and endurance    Cervical radiculopathy    Lumbar spondylosis    DDD (degenerative disc disease), lumbar    DDD (degenerative disc disease), cervical    Osteoarthritis of spine with radiculopathy, cervical region    Carpal tunnel syndrome on both sides    Ulnar neuropathy of both upper extremities    Left lateral epicondylitis    Left carpal tunnel syndrome    Generalized weakness    Acute kidney injury superimposed on chronic kidney disease    Transaminitis    Urinary retention    Chronic gout of left wrist    Polyarticular gout    Acute idiopathic gout of multiple sites    Urinary tract infection with hematuria    Hyperkalemia    Hyponatremia    Leukocytosis    Pseudomonas infection    Decreased strength, endurance, and mobility    Gait instability    Balance problem    Alteration in instrumental activities of daily living (IADL)    Decreased  strength    SOBOE (shortness of breath on exertion)    Anemia    Pulmonary hypertension    Mixed hyperlipidemia    Abnormal nuclear stress test    Reactive airway disease without complication    Hyperlipidemia, acquired    Aortic root dilatation    Colitis    Left hip pain    Torticollis    Acute osteomyelitis of lumbar spine    Severe sepsis    AMS (altered mental status)     Active Hospital Problems    Diagnosis  POA    *Severe  sepsis [A41.9, R65.20]  Yes    Acute osteomyelitis of lumbar spine [M46.26]  Yes    AMS (altered mental status) [R41.82]  Yes    Urinary retention [R33.9]  Yes    History of CVA (cerebrovascular accident) [Z86.73]  Not Applicable    Morbid obesity with BMI of 45.0-49.9, adult [E66.01, Z68.42]  Not Applicable     Patient is aware of need for weight loss  and has been asked to make an effort to improve diet        Resolved Hospital Problems   No resolved problems to display.         SUBJECTIVE:   HPI:   Mr. Pate is a 73M w/ hx CVA, CKD3, HTN, DM2, COPD who presents with increasing lethargy and confusion. Per ED team, patient had 3 weeks of low back pain, with a fall yesterday leading to his presentation. He had subjective lower extremity weakness and urinary retention after the fall. Septic on presentation to ED. Too altered to meaningfully participate in spine motor exam at time of NSGY evaluation.      Follow-up For:  Procedure(s) (LRB):  **LOOP X**FUSION, SPINE, LUMBAR, TLIF, POSTERIOR APPROACH, USING PEDICLE SCREW (N/A)      Interval HPI:   Sustained HR in 120s. HS stable and BP normal. Low grade fever to 100.5. MRI completed with poor quality. No clear evidence of other OM in rest of spine but MRI sub-optimal. He remained delirious and altered. Bx NGTD. On broad spectrum abx and switched to merrem.         OBJECTIVE:     Vitals:  Vital Signs Range (Last 24H):  Temp:  [97.7 °F (36.5 °C)-100.5 °F (38.1 °C)]   Pulse:  [119-131]   Resp:  [20-28]   BP: (122-152)/(66-88)   SpO2:  [94 %-97 %]     I & O (Last 24H):    Intake/Output Summary (Last 24 hours) at 12/13/2023 0793  Last data filed at 12/13/2023 0455  Gross per 24 hour   Intake 2120.15 ml   Output 455 ml   Net 1665.15 ml           Physical Exam:  General: appears well-developed and well-nourished, no distress  HEENT: MICHAEL PIMENTEL FD   Cardiovascular: elevated HR  Pulm: NWOB  Abdominal: soft  Neuro:   Altered. Not following commands but move spontaneously        Move/withdrew BUE and BLE (minimally to pain L>R)   Baseline R side weakness and stiffness from remote stroke   Lumbar midline tenderness     Labs:  Recent Results (from the past 24 hour(s))   Echo    Collection Time: 12/12/23  8:30 AM   Result Value Ref Range    RA Width 3.86 cm    Left Atrium Major Axis 6.30 cm    Left Atrium Minor Axis 4.67 cm    RA Major Axis 4.55 cm    LV Diastolic Volume 91.50 mL    LV Systolic Volume 43.81 mL    TR Max Roland 2.46 m/s    Ao VTI 15.38 cm    Ao peak roland 1.25 m/s    LVOT peak VTI 20.93 cm    LVOT peak roland 0.99 m/s    LVOT diameter 2.15 cm    AV mean gradient 4 mmHg    TAPSE 1.34 cm    RVDD 3.88 cm    LA size 2.66 cm    Ascending aorta 3.36 cm    STJ 2.86 cm    Sinus 3.09 cm    LVIDs 3.29 2.1 - 4.0 cm    Posterior Wall 0.97 0.6 - 1.1 cm    IVS 0.75 0.6 - 1.1 cm    LVIDd 4.48 3.5 - 6.0 cm    TDI LATERAL 0.13 m/s    LA WIDTH 3.31 cm    TDI SEPTAL 0.11 m/s    FS 27 (A) 28 - 44 %    LA volume 40.14 cm3    LV mass 124.08 g    ZLVIDD -7.45     ZLVIDS -4.32     Left Ventricle Relative Wall Thickness 0.43 cm    AV valve area 4.94 cm²    AV Velocity Ratio 0.79     AV index (prosthetic) 1.36     Mean e' 0.12 m/s    LVOT area 3.6 cm2    LVOT stroke volume 75.95 cm3    AV peak gradient 6 mmHg    LV Systolic Volume Index 18.8 mL/m2    LV Diastolic Volume Index 39.27 mL/m2    LA Volume Index 17.2 mL/m2    LV Mass Index 53 g/m2    Triscuspid Valve Regurgitation Peak Gradient 24 mmHg    HAILEE by Velocity Ratio 2.87 cm²    BSA 2.42 m2    TV resting pulmonary artery pressure 24 mmHg    RV TB RVSP 2 mmHg    Est. RA pres 0 mmHg   Troponin I    Collection Time: 12/12/23 12:05 PM   Result Value Ref Range    Troponin I 0.181 (H) 0.000 - 0.026 ng/mL   Sedimentation rate    Collection Time: 12/12/23 12:05 PM   Result Value Ref Range    Sed Rate >120 (H) 0 - 23 mm/Hr   C-reactive protein    Collection Time: 12/12/23 12:05 PM   Result Value Ref Range    .4 (H) 0.0 - 8.2 mg/L   POCT glucose     Collection Time: 12/12/23 12:32 PM   Result Value Ref Range    POCT Glucose 137 (H) 70 - 110 mg/dL   Troponin I    Collection Time: 12/12/23  4:53 PM   Result Value Ref Range    Troponin I 0.142 (H) 0.000 - 0.026 ng/mL   Brain natriuretic peptide    Collection Time: 12/12/23  4:53 PM   Result Value Ref Range     (H) 0 - 99 pg/mL   Lactic acid, plasma    Collection Time: 12/12/23  4:53 PM   Result Value Ref Range    Lactate (Lactic Acid) 1.8 0.5 - 2.2 mmol/L   POCT glucose    Collection Time: 12/12/23  6:00 PM   Result Value Ref Range    POCT Glucose 117 (H) 70 - 110 mg/dL   POCT glucose    Collection Time: 12/12/23  9:16 PM   Result Value Ref Range    POCT Glucose 119 (H) 70 - 110 mg/dL   Lactic acid, plasma    Collection Time: 12/12/23 10:06 PM   Result Value Ref Range    Lactate (Lactic Acid) 1.6 0.5 - 2.2 mmol/L   Comprehensive metabolic panel    Collection Time: 12/12/23 10:06 PM   Result Value Ref Range    Sodium 136 136 - 145 mmol/L    Potassium 4.0 3.5 - 5.1 mmol/L    Chloride 102 95 - 110 mmol/L    CO2 27 23 - 29 mmol/L    Glucose 96 70 - 110 mg/dL    BUN 24 (H) 8 - 23 mg/dL    Creatinine 1.1 0.5 - 1.4 mg/dL    Calcium 9.6 8.7 - 10.5 mg/dL    Total Protein 6.8 6.0 - 8.4 g/dL    Albumin 1.6 (L) 3.5 - 5.2 g/dL    Total Bilirubin 1.5 (H) 0.1 - 1.0 mg/dL    Alkaline Phosphatase 79 55 - 135 U/L    AST 41 (H) 10 - 40 U/L    ALT 32 10 - 44 U/L    eGFR >60.0 >60 mL/min/1.73 m^2    Anion Gap 7 (L) 8 - 16 mmol/L   Troponin I    Collection Time: 12/12/23 11:33 PM   Result Value Ref Range    Troponin I 0.063 (H) 0.000 - 0.026 ng/mL   POCT glucose    Collection Time: 12/13/23  2:41 AM   Result Value Ref Range    POCT Glucose 128 (H) 70 - 110 mg/dL   Comprehensive Metabolic Panel (CMP)    Collection Time: 12/13/23  3:13 AM   Result Value Ref Range    Sodium 137 136 - 145 mmol/L    Potassium 3.9 3.5 - 5.1 mmol/L    Chloride 103 95 - 110 mmol/L    CO2 25 23 - 29 mmol/L    Glucose 97 70 - 110 mg/dL    BUN 24 (H) 8 -  23 mg/dL    Creatinine 1.2 0.5 - 1.4 mg/dL    Calcium 9.2 8.7 - 10.5 mg/dL    Total Protein 6.2 6.0 - 8.4 g/dL    Albumin 1.5 (L) 3.5 - 5.2 g/dL    Total Bilirubin 1.4 (H) 0.1 - 1.0 mg/dL    Alkaline Phosphatase 70 55 - 135 U/L    AST 32 10 - 40 U/L    ALT 26 10 - 44 U/L    eGFR >60.0 >60 mL/min/1.73 m^2    Anion Gap 9 8 - 16 mmol/L   Magnesium    Collection Time: 12/13/23  3:13 AM   Result Value Ref Range    Magnesium 2.3 1.6 - 2.6 mg/dL   Phosphorus    Collection Time: 12/13/23  3:13 AM   Result Value Ref Range    Phosphorus 3.2 2.7 - 4.5 mg/dL   CBC with Automated Differential    Collection Time: 12/13/23  3:13 AM   Result Value Ref Range    WBC 14.93 (H) 3.90 - 12.70 K/uL    RBC 3.13 (L) 4.60 - 6.20 M/uL    Hemoglobin 9.4 (L) 14.0 - 18.0 g/dL    Hematocrit 28.3 (L) 40.0 - 54.0 %    MCV 90 82 - 98 fL    MCH 30.0 27.0 - 31.0 pg    MCHC 33.2 32.0 - 36.0 g/dL    RDW 14.3 11.5 - 14.5 %    Platelets 284 150 - 450 K/uL    MPV 10.7 9.2 - 12.9 fL    Immature Granulocytes 0.7 (H) 0.0 - 0.5 %    Gran # (ANC) 12.4 (H) 1.8 - 7.7 K/uL    Immature Grans (Abs) 0.11 (H) 0.00 - 0.04 K/uL    Lymph # 0.8 (L) 1.0 - 4.8 K/uL    Mono # 1.6 (H) 0.3 - 1.0 K/uL    Eos # 0.0 0.0 - 0.5 K/uL    Baso # 0.02 0.00 - 0.20 K/uL    nRBC 0 0 /100 WBC    Gran % 82.8 (H) 38.0 - 73.0 %    Lymph % 5.3 (L) 18.0 - 48.0 %    Mono % 10.9 4.0 - 15.0 %    Eosinophil % 0.2 0.0 - 8.0 %    Basophil % 0.1 0.0 - 1.9 %    Differential Method Automated    Lactic acid, plasma    Collection Time: 12/13/23  3:13 AM   Result Value Ref Range    Lactate (Lactic Acid) 1.3 0.5 - 2.2 mmol/L         Recent Labs   Lab 12/11/23 2108 12/12/23 0533 12/13/23 0313   WBC 16.33* 16.18* 14.93*   HGB 11.1* 11.0* 9.4*   HCT 33.5* 34.3* 28.3*    221 284         Recent Labs   Lab 12/12/23 0533 12/12/23 2206 12/13/23 0313    136 137   K 4.6 4.0 3.9    102 103   CO2 20* 27 25   BUN 22 24* 24*   CREATININE 1.1 1.1 1.2   CALCIUM 9.7 9.6 9.2         Microbiology  Results (last 7 days)       Procedure Component Value Units Date/Time    Blood culture [[id number]] Collected: 12/12/23 0033    Order Status: Completed Specimen: Blood Updated: 12/13/23 0613     Blood Culture, Routine No Growth to date      No Growth to date    Blood culture [[id number]] Collected: 12/12/23 0033    Order Status: Completed Specimen: Blood Updated: 12/13/23 0613     Blood Culture, Routine No Growth to date      No Growth to date    Blood culture x two cultures. Draw prior to antibiotics. [[id number]] Collected: 12/10/23 2007    Order Status: Completed Specimen: Blood from Peripheral, Antecubital, Left Updated: 12/12/23 2212     Blood Culture, Routine No Growth to date      No Growth to date      No Growth to date    Narrative:      Aerobic and anaerobic    Blood culture x two cultures. Draw prior to antibiotics. [[id number]] Collected: 12/10/23 2007    Order Status: Completed Specimen: Blood from Peripheral, Hand, Left Updated: 12/12/23 2212     Blood Culture, Routine No Growth to date      No Growth to date      No Growth to date    Narrative:      Aerobic and anaerobic    Gram stain [[id number]]     Order Status: No result Specimen: Abscess     AFB Culture & Smear [[id number]]     Order Status: No result Specimen: Abscess     Culture, Anaerobic [[id number]]     Order Status: No result Specimen: Abscess     Aerobic culture [[id number]]     Order Status: No result Specimen: Abscess             Imaging:  Imaging Results              MRI Brain W WO Contrast (Final result)  Result time 12/12/23 11:10:53      Final result by Kevin Guerrero MD (12/12/23 11:10:53)                   Impression:      Markedly limited by motion.  The postcontrast images particulate are inadequate for evaluation.    No hydrocephalus mass effect or acute intracranial hemorrhage.  Punctate focus of increased signal on diffusion-weighted imaging within the dorsal shaila is thought more likely artifactual however a tiny recent  infarct is difficult to fully exclude but again thought unlikely.  No other evidence of acute infarct.      Electronically signed by: Kevin Cesar  Date:    12/12/2023  Time:    11:10               Narrative:    EXAMINATION:  MRI BRAIN W WO CONTRAST    CLINICAL HISTORY:  Mental status change, unknown cause;    TECHNIQUE:  Multiplanar multisequence MR imaging of the brain was performed before and after the administration of 10 mL Gadavist intravenous contrast.    COMPARISON:  Head CT 12/10/2023, MRI 01/30/2016    FINDINGS:  There is marked motion artifact.  There is no evidence of hydrocephalus mass effect or acute intracranial hemorrhage.    On diffusion-weighted imaging a punctate focus of increased signal within the dorsal shaila is thought more likely artifactual however a tiny recent infarct is not excluded.    Chronic hemosiderin staining from prior insult involving the left thalamus and deep white matter similar to the prior study.  Chronic right thalamic and right deep white matter infarct identified.    Postcontrast imaging is markedly limited due to motion and inadequate to evaluate for abnormal enhancement.    Normal arterial flow voids are preserved at the skull base.    The visualized sinuses and mastoid air cells are clear                                       MRI Cervical Spine W WO Cont (Final result)  Result time 12/12/23 11:09:28      Final result by Humberto Arciniega MD (12/12/23 11:09:28)                   Impression:      1. Severely motion compromised, nearly nondiagnostic examination.  2. Provided sequences without convincing evidence of acute infectious or noninfectious inflammatory process in the cervical or thoracic spine.  3. Additional details, as provided in the body of the report.      Electronically signed by: Humberto Arciniega  Date:    12/12/2023  Time:    11:09               Narrative:    EXAMINATION:  MRI THORACIC SPINE W WO CONTRAST; MRI CERVICAL SPINE W WO CONTRAST    CLINICAL  HISTORY:  Osteomyelitis, thoracic;; Osteomyelitis, cervical;    TECHNIQUE:  MRI of the cervical and thoracic spine performed without with contrast.  Post-contrast series performed while intravenous administration 10 mL Gadavist contrast.    COMPARISON:  Correlation made with CT of the chest, abdomen, and pelvis performed 12/10/2023 and MRI of the lumbar spine performed 12/11/2023.    FINDINGS:  Comment: Examination is substantially motion compromised, and nearly nondiagnostic.    CERVICAL SPINE:    Alignment: There is no significant vertebral subluxation.    Developmental spinal canal narrowing: None.    Discs: Multilevel degenerative disc osteophyte complex formation.    Vertebrae: No definite acute abnormality.    Spinal cord: Essentially nondiagnostic examination.  No definite acute abnormality identified.    Posterior fossa: No posterior fossa abnormalities are identified.    Degenerative: Findings nondiagnostic for assessment of degenerative spinal canal and foraminal narrowing.  No definite critical spinal canal foraminal narrowing.  Central protrusion at the C3-4 level contacts the ventral cord.  Least mild central spinal canal stenosis suggested at C4-5, C5-6, and C6-7.    No definite pathologic enhancement.    THORACIC SPINE:    Alignment: There is no significant vertebral subluxation.    Developmental spinal canal narrowing: None.    Discs: Multilevel degenerative disc disease.    Disc bulging at the T5-6, T6-7, and T7-8 appears contribute to at least mild-to-moderate central spinal canal stenosis.    Vertebrae: No definite acute abnormality.    Spinal cord: Nearly nondiagnostic assessment without definite acute abnormality.    Additional comments: No definite pathologic enhancement.                                       MRI Thoracic Spine W WO Cont (Final result)  Result time 12/12/23 11:09:28      Final result by Humberto Arciniega MD (12/12/23 11:09:28)                   Impression:      1. Severely  motion compromised, nearly nondiagnostic examination.  2. Provided sequences without convincing evidence of acute infectious or noninfectious inflammatory process in the cervical or thoracic spine.  3. Additional details, as provided in the body of the report.      Electronically signed by: Humberto Arciniega  Date:    12/12/2023  Time:    11:09               Narrative:    EXAMINATION:  MRI THORACIC SPINE W WO CONTRAST; MRI CERVICAL SPINE W WO CONTRAST    CLINICAL HISTORY:  Osteomyelitis, thoracic;; Osteomyelitis, cervical;    TECHNIQUE:  MRI of the cervical and thoracic spine performed without with contrast.  Post-contrast series performed while intravenous administration 10 mL Gadavist contrast.    COMPARISON:  Correlation made with CT of the chest, abdomen, and pelvis performed 12/10/2023 and MRI of the lumbar spine performed 12/11/2023.    FINDINGS:  Comment: Examination is substantially motion compromised, and nearly nondiagnostic.    CERVICAL SPINE:    Alignment: There is no significant vertebral subluxation.    Developmental spinal canal narrowing: None.    Discs: Multilevel degenerative disc osteophyte complex formation.    Vertebrae: No definite acute abnormality.    Spinal cord: Essentially nondiagnostic examination.  No definite acute abnormality identified.    Posterior fossa: No posterior fossa abnormalities are identified.    Degenerative: Findings nondiagnostic for assessment of degenerative spinal canal and foraminal narrowing.  No definite critical spinal canal foraminal narrowing.  Central protrusion at the C3-4 level contacts the ventral cord.  Least mild central spinal canal stenosis suggested at C4-5, C5-6, and C6-7.    No definite pathologic enhancement.    THORACIC SPINE:    Alignment: There is no significant vertebral subluxation.    Developmental spinal canal narrowing: None.    Discs: Multilevel degenerative disc disease.    Disc bulging at the T5-6, T6-7, and T7-8 appears contribute to at  least mild-to-moderate central spinal canal stenosis.    Vertebrae: No definite acute abnormality.    Spinal cord: Nearly nondiagnostic assessment without definite acute abnormality.    Additional comments: No definite pathologic enhancement.                                        MRI Lumbar Spine W WO Cont (Final result)  Result time 12/11/23 03:18:20      Final result by Dayne Marquez MD (12/11/23 03:18:20)                   Impression:      Findings suggestive of discitis/osteomyelitis at L2-L3 with left psoas abscess measuring up to 2.2 cm in size.    Probable early discitis/osteomyelitis at L3-L4.    Motion limited study.  Thin epidural abscess is difficult to exclude.    Advanced multilevel degenerative changes throughout the lumbar spine with severe central canal stenosis and significant bilateral neural foraminal narrowing at L4-L5.    Multilevel facet arthropathy and facet edema, which could be exaggerated by aforementioned inflammatory changes in the paraspinal soft tissues.    Additional findings discussed in the body of the report.    This report was flagged in [employer] as abnormal.      Electronically signed by: Dayne Marquez MD  Date:    12/11/2023  Time:    03:18               Narrative:    EXAMINATION:  MRI LUMBAR SPINE W WO CONTRAST    CLINICAL HISTORY:  Low back pain, progressive neurologic deficit;    TECHNIQUE:  Multiplanar, multisequence MR imaging of the lumbar spine was obtained with and without 10 mL Gadavist IV contrast.    COMPARISON:  CT, 12/10/2023.    FINDINGS:  Exam quality is significantly limited by motion.    Reversal of the normal lumbar lordosis.  Minimal retrolisthesis of L2 with respect to L3.  Minimal anterolisthesis of L4 with respect to L5.  Otherwise, grossly normal sagittal alignment.    Extensive inflammatory changes and edema in the paraspinal muscles and psoas muscles throughout the majority of the lumbar spine.  Multifocal bilateral facet edema and facet degenerative  changes.    Widening of the disc space at L2-L3 with increased STIR signal and enhancement.  Associated edema signal and enhancement involving the inferior endplate of L2 and superior endplate of L3.  There is also subtle increased edema and enhancement involving the L3-L4 disc space.  Findings are suggestive of discitis osteomyelitis.    There is an ill-defined and poorly evaluated collection in the left psoas muscle at the level of L2-L3 measuring roughly 2.2 x 1.8 x 1.8 cm (series 13, image 18 and series 10, image 20).  This finding is most consistent with an associated abscess or phlegmon.    Evaluation of the central canal and cauda quinine a significantly limited by motion and image noise.  There is mild enhancement of the thecal sac raising the question of small epidural abscess, though this finding is not definitive as evaluation is significantly limited by motion.    L1-2: Severe disc space height loss.  Mild-to-moderate bilateral neural foraminal narrowing.  No severe central canal stenosis.    L2-3: Widening of the intervertebral disc space with increased disc space signal and enhancement consistent with discitis/osteomyelitis.  At least moderate bilateral neural foraminal narrowing and mild central canal stenosis.    L3-4: Significant disc space height loss.  Abnormal signal and enhancement involving the L3-L4 disc space.  Broad-based posterior disc bulge extending into the central canal and bilateral lateral recesses.  At least moderate bilateral neural foraminal narrowing and moderate central canal stenosis.    L4-5: Mild disc space height loss and disc desiccation.  Broad-based posterior disc bulge and ligamentum flavum hypertrophy contributes to severe central canal stenosis and moderate bilateral neural foraminal narrowing.    L5-S1:  Mild disc space height loss and disc desiccation.  Small posterior disc bulge.  Mild right greater than left neural foraminal narrowing.  No significant central canal  stenosis.                                        CT Chest Abdomen Pelvis With IV Contrast (XPD) NO Oral Contrast (Final result)  Result time 12/10/23 22:09:41      Final result by Dayne Marquez MD (12/10/23 22:09:41)                   Impression:      Motion and artifact limited study.    New subtle inflammatory changes in the paraspinal soft tissues and retroperitoneum/psoas muscles at the level L2-L3 with slightly worse widening of the L2-L3 disc space and possible early endplate erosive changes at this level.  Underlying discitis/osteomyelitis at this level is a consideration.  Suggest correlation with symptoms, risk factors, and inflammatory markers.  Lumbar spine MRI with and without contrast may provide improved sensitivity and further evaluation of the central canal if clinically warranted.    Mildly worse bibasilar subsegmental atelectasis and possible trace bilateral pleural fluid.  Superimposed infectious/inflammatory process difficult to exclude in the lung bases secondary to extensive artifact in this region.    Probable hepatic steatosis.    Mild stool burden in the colon with moderate distension of the rectum with solid stool and trace presacral edema.  Suggest correlation for constipation or stercoral colitis.    Advanced degenerative changes in the spine with multilevel central canal stenosis and neural foraminal narrowing most pronounced in the lower lumbar spine.    Prostatomegaly.    Stable benign fat containing right adrenal nodule.    Other incidental findings discussed in the body of the report.    This report was flagged in [employer] as abnormal.      Electronically signed by: Dayne Marquez MD  Date:    12/10/2023  Time:    22:09               Narrative:    EXAMINATION:  CT CHEST ABDOMEN PELVIS WITH IV CONTRAST (XPD)    CLINICAL HISTORY:  Sepsis;    TECHNIQUE:  Low dose axial images, sagittal and coronal reformations were obtained from the thoracic inlet to the pubic symphysis following the  IV administration of 100 mL of Omnipaque 350 .  Oral contrast was not given.    COMPARISON:  CT abdomen pelvis, 08/26/2023.    FINDINGS:  Evaluation is limited by extensive streak artifact due to the patient's arms overlying the field of view.  Exam quality also limited by motion and poor bolus timing.    Chest:    Base of the neck is negative for acute finding.  There is a 1.2 cm hypodensity in the right lobe of the thyroid gland, too small for dedicated follow-up.    Thoracic aorta is normal in caliber with mild calcific atherosclerosis.  No dissection.    Heart size is normal.  Mild coronary artery calcifications.  No pericardial effusion.    Evaluation of the pulmonary parenchyma limited by motion.  Trachea and proximal airways are patent.  There is prominent bibasilar subsegmental atelectasis, right side worse than left, mildly worse when compared with the prior study.  Trace pleural effusions cannot be excluded, noting that evaluation of this region is limited by motion.  Superimposed small areas of consolidation in the right lung base also difficult to exclude secondary to motion and streak artifact in this region.    Otherwise, no sizable pleural effusion.  No pneumothorax.  No bulky mediastinal lymphadenopathy.    Abdomen:    Liver is stable and negative for acute finding.  There is probable hepatic steatosis.  Detailed evaluation of the liver parenchyma significantly limited by motion and streak artifact.  Gallbladder is unremarkable allowing for motion.  No intrahepatic biliary ductal dilatation.    Spleen, adrenals, and pancreas are stable.  There is a fat density lesion in the right adrenal gland, likely a benign myelolipoma, stable.  Detailed evaluation of the pancreatic parenchyma limited by motion and streak artifact.    Kidneys are stable.  No hydronephrosis.  Evaluation for renal stones limited by poor bolus timing and presence of contrast in the collecting system.  Probable small cyst in the lower  pole of the right kidney.    No small bowel obstruction.  Normal appendix.  Mild stool burden in the colon.  Rectum is distended up to 7 cm in transverse width containing solid stool.  Minimal presacral edema.    No pneumoperitoneum or organized fluid collection.    No bulky retroperitoneal lymphadenopathy.  There is subtle retroperitoneal fat stranding with asymmetric enlargement of the left psoas muscle and heterogeneous attenuation in the bilateral psoas muscles, left side worse than right.    Abdominal aorta is normal in caliber with mild calcific atherosclerosis.    Portal vein is patent.  No portal venous gas.    Pelvis:    Urinary bladder is mildly distended and otherwise unremarkable.  Rectum is distended with solid stool.  There is mild presacral edema.  Prostate is enlarged and similar to the prior study.  No significant pelvic free fluid.    Bones and soft tissues:    No aggressive osseous lesions.  Moderate degenerative changes in the thoracic spine.  Detailed evaluation of the lumbar spine significantly limited by motion and extensive streak artifact.  There is slightly worse widening of the L2-L3 disc space with resolution of previously seen vacuum phenomena at this location.  Sclerotic endplate changes at L2-L3 with minimal endplate lucencies.  Significant central canal stenosis and neural foraminal narrowing at multiple levels in the lumbar spine.  Detailed evaluation of the central canal limited by aforementioned artifact.  Central canal stenosis appears most pronounced at L4-L5.  There are paravertebral and retroperitoneal inflammatory changes centered at the level of L2-L3.  Retroperitoneal microabscess or phlegmon not excluded.  No large drainable fluid collection identified, allowing for limitations.                                       CT Head Without Contrast (Final result)  Result time 12/10/23 21:42:25      Final result by Marcial De La Rosa MD (12/10/23 21:42:25)                    Impression:      Chronic change noted, there is no evidence for superimposed acute intracranial process.      Electronically signed by: Marcial De La Rosa  Date:    12/10/2023  Time:    21:42               Narrative:    EXAMINATION:  CT HEAD WITHOUT CONTRAST    CLINICAL HISTORY:  Mental status change, unknown cause;    TECHNIQUE:  Low dose axial images were obtained through the head.  Coronal and sagittal reformations were also performed. Contrast was not administered.    COMPARISON:  CT examination of the brain without contrast February 27, 2020    FINDINGS:  Artifact is present diminishing sensitivity of the exam.  When accounting for artifact the intracranial appearance is consistent with chronic change.  Involutional change and chronic appearing white matter change noted, focal areas of low density along the periventricular white matter, basal ganglia, and right thalamus consistent with areas of remote lacunar-type ischemic change are noted.    There is no evidence for intracranial mass, mass effect or midline shift and there is no evidence for acute intracranial hemorrhage.  Near empty sella configuration is noted.  Otherwise appropriate CSF spaces are seen at the skull base.    The mastoid air cells appear well aerated, mild paranasal sinus mucosal thickening is noted.  The osseous structures appear intact.  The orbits appear intact.                                       X-Ray Chest AP Portable (Final result)  Result time 12/10/23 20:40:16      Final result by Caren Noe MD (12/10/23 20:40:16)                   Impression:      No acute intrathoracic abnormality detected.  Slightly low lung volumes.      Electronically signed by: Caren Noe  Date:    12/10/2023  Time:    20:40               Narrative:    EXAMINATION:  AP PORTABLE CHEST    CLINICAL HISTORY:  Sepsis;    TECHNIQUE:  AP portable chest radiograph was submitted.    COMPARISON:  07/06/2021    FINDINGS:  AP portable chest radiograph  demonstrates a cardiac silhouette within normal limits.  There is tortuosity and ectasia of the thoracic aorta.  There is no focal consolidation, pneumothorax, or pleural effusion.  The lung volumes are slightly low.  There is mild asymmetric elevation right hemidiaphragm.  The bones are diffusely osteopenic.                                      Medication:  Scheduled Meds:   atorvastatin  40 mg Oral Daily    fluticasone furoate-vilanteroL  1 puff Inhalation Daily    gabapentin  600 mg Oral TID    meropenem (MERREM) IVPB  2 g Intravenous Q8H    vancomycin (VANCOCIN) IV (PEDS and ADULTS)  2,000 mg Intravenous Q24H       Infusions:      PRN Meds:  acetaminophen, aluminum-magnesium hydroxide-simethicone, dextrose 10%, dextrose 10%, glucagon (human recombinant), glucose, glucose, insulin aspart U-100, levalbuterol, melatonin, naloxone, ondansetron, sodium chloride 0.9%, Pharmacy to dose Vancomycin consult **AND** vancomycin - pharmacy to dose      Lines/Drains:       Peripheral IV - Single Lumen 12/11/23 0006 22 G Anterior;Right Shoulder (Active)   Site Assessment Clean;Dry;Intact 12/11/23 2000   Extremity Assessment Distal to IV No redness;No swelling;No warmth;No abnormal discoloration 12/11/23 2000   Line Status Infusing 12/11/23 2000   Dressing Status Dry;Clean;Intact 12/11/23 2000   Dressing Intervention Integrity maintained 12/11/23 2000   Dressing Change Due 12/14/23 12/11/23 1730   Site Change Due 12/14/23 12/11/23 1730   Reason Not Rotated Not due 12/11/23 2000   Number of days: 1            Peripheral IV - Single Lumen 12/11/23 0821 20 G;1 in Posterior;Right Hand (Active)   Site Assessment Clean;Dry;Intact 12/11/23 2000   Extremity Assessment Distal to IV No abnormal discoloration;No redness;No swelling;No warmth 12/11/23 2000   Line Status Infusing 12/11/23 2000   Dressing Status Clean;Intact;Dry 12/11/23 2000   Dressing Intervention Integrity maintained 12/11/23 2000   Dressing Change Due 12/14/23 12/11/23  "1730   Site Change Due 12/14/23 12/11/23 1730   Reason Not Rotated Not due 12/11/23 2000   Number of days: 0            Midline Catheter Insertion/Assessment  - Single Lumen 12/12/23 0215 Left basilic vein (medial side of arm) 20g x 10cm (Active)   $ Midline Charges (Upon insertion) Bedside Insertion Performed Pt > 3 Years Old;Midline Catheter (Supply);Ultrasound Guide for Vascular Access 12/12/23 0230   Site Assessment Clean;Dry;Intact;No redness;No swelling 12/12/23 0230   IV Device Securement catheter securement device 12/12/23 0230   Line Status Blood return noted;Flushed;Saline locked 12/12/23 0230   Dressing Type CHG impregnated dressing/sponge;Central line dressing 12/12/23 0230   Dressing Status Clean;Intact;Dry 12/12/23 0230   Dressing Intervention First dressing 12/12/23 0230   Dressing Change Due 12/19/23 12/12/23 0230   Site Change Due 01/10/24 12/12/23 0230   Reason Not Rotated Not due 12/12/23 0230   Number of days: 0            Urethral Catheter 12/11/23 0100 Silicone 16 Fr. (Active)   Site Assessment Clean;Intact 12/12/23 0037   Collection Container Urimeter 12/12/23 0037   Securement Method secured to top of thigh w/ adhesive device 12/11/23 1827   Catheter Care Performed yes 12/11/23 1827   Reason for Continuing Urinary Catheterization Urinary retention 12/11/23 1827   CAUTI Prevention Bundle Securement Device in place with 1" slack;Intact seal between catheter & drainage tubing;Drainage bag/urimeter off the floor;Sheeting clip in use;No dependent loops or kinks;Drainage bag/urimeter not overfilled (<2/3 full);Drainage bag/urimeter below bladder 12/11/23 1827   Output (mL) 200 mL 12/12/23 0037   Number of days: 1       ASSESSMENT/PLAN:   A 73M w/ hx CVA, CKD3, HTN, DM2, and COPD who presents with AMS likely 2/2 underling sepsis as well as progressive back pain and inability to ambulate for the past 2 weeks due to progressive L2-L3 osteodiscitis and presumed mechanical instability.      CT shows " lumbar spondylosis and progressive errosive changes at L2-L3   MRI with lumbar spondylosis 2/2 multilevel of DDD and L2-L3 osteodiscitis wrose at L2-L4 with moderate to severe stenosis as well as L psoas abscess.     Infection markers elevated: , CRP >120. Bcx negative thus far 12/10 and 12/12. On broad spectrum abx       Tentative  plan for L2-pelvis decompression and fusion on 12/15/23 pending medical optimization and clearance   May consider IR disc bx   MRI w/wo C and T spine obtained but suboptimal without evidence of infection other where else   Continue AMS and sepsis work up per primary team  TTE completed, EF 45-50 %  ID consultation and continue broad spec abx   Please trend infection markers   DVT US BLE: negative   Please document preoperative risk assessment and stratification  OK for DVT ppx with SQH till Thursday evening   NSGY will follow  Further care per primary team     Dispo: OR Friday pending surgical readiness and clearance     D/w Dr.Kalyvas Suzan Traylor MD  NSGY

## 2023-12-13 NOTE — TRANSFER OF CARE
"Anesthesia Transfer of Care Note    Patient: Bj Pate Jr.    Procedure(s) Performed: * No procedures listed *    Patient location: PACU    Anesthesia Type: general    Transport from OR: Transported from OR on 6-10 L/min O2 by face mask with adequate spontaneous ventilation    Post pain: adequate analgesia    Post assessment: no apparent anesthetic complications    Post vital signs: stable    Level of consciousness: responds to stimulation    Nausea/Vomiting: no nausea/vomiting    Complications: none    Transfer of care protocol was followed      Last vitals: Visit Vitals  BP (!) 149/77 (BP Location: Right arm, Patient Position: Lying)   Pulse 110   Temp 36.5 °C (97.7 °F) (Temporal)   Resp 18   Ht 5' 9" (1.753 m)   Wt 120.2 kg (264 lb 15.9 oz)   SpO2 100%   BMI 39.13 kg/m²     "

## 2023-12-13 NOTE — PT/OT/SLP PROGRESS
Speech Language Pathology      Bj Pate Jr.  MRN: [id number]    Patient not seen today secondary to npo for possible IR procedure today.  Per nursing, pt has been lethargic with no PO intake over the last two days.  SLP to continue to follow but pt may benefit from alternative means of nutrition if lethargy persists.

## 2023-12-13 NOTE — PLAN OF CARE
Inpatient consult to Vascular (Stroke) Neurology  Consult performed by: Chemo Wilks DO  Consult ordered by: Krystian Reed MD         Reviewed imaging w/staff (Dr. Franco). Agree with radiology that finding in the dorsal shaila is most likely artifact, with infarct being unlikely. No further neuroimaging is required from a neurovascular perspective at this time. If further concern for encephalopathy out of proportion to sepsis, primary team may discuss with general neurology. Please contact us with further questions/concerns.

## 2023-12-13 NOTE — PLAN OF CARE
I have reviewed the chart of Bj Pate Jr. and participated in the care of the patient who is hospitalized for the following:    Active Hospital Problems    Diagnosis    *Severe sepsis    Discitis    Osteomyelitis    Acute on chronic systolic heart failure    Encephalopathy acute    Malnutrition     Noted that pt has had a -19% wt change since admit.       Psoas muscle abscess    Back pain    Acute osteomyelitis of lumbar spine    AMS (altered mental status)    Urinary retention    History of CVA (cerebrovascular accident)    Morbid obesity with BMI of 45.0-49.9, adult     Patient is aware of need for weight loss  and has been asked to make an effort to improve diet            I have reviewed the Bj Pate Jr. with the multidisciplinary team during rounds.      Sarah Cruz NP  [facility]

## 2023-12-13 NOTE — CONSULTS
[facility] - Neurosurgery ([facility])  Wound Care    Patient Name:  Bj Pate Jr.   MRN:  [id number]  Date: 12/13/2023  Diagnosis: Severe sepsis    History:     Past Medical History:   Diagnosis Date    Acute on chronic systolic heart failure 12/13/2023    Anemia 06/04/2021    Anticoagulant long-term use     Basal ganglia hemorrhage     Left basal ganglia hemorrhage with resultant right-sided hemiparesis which has resolved.     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        Social History     Socioeconomic History    Marital status:     Number of children: 8    Highest education level: 11th grade   Occupational History    Occupation:    Tobacco Use    Smoking status: Never    Smokeless tobacco: Never   Substance and Sexual Activity    Alcohol use: Yes     Alcohol/week: 0.0 standard drinks of alcohol     Comment: occacionally    Drug use: No    Sexual activity: Not Currently     Social Determinants of Health     Financial Resource Strain: Low Risk  (12/12/2023)    Overall Financial Resource Strain (CARDIA)     Difficulty of Paying Living Expenses: Not very hard   Food Insecurity: No Food Insecurity (12/12/2023)    Hunger Vital Sign     Worried About Running Out of Food in the Last Year: Never true     Ran Out of Food in the Last Year: Never true   Transportation Needs: No Transportation Needs (12/12/2023)    PRAPARE - Transportation     Lack of Transportation (Medical): No      Lack of Transportation (Non-Medical): No   Physical Activity: Inactive (12/12/2023)    Exercise Vital Sign     Days of Exercise per Week: 0 days     Minutes of Exercise per Session: 0 min   Stress: Unknown (12/12/2023)    [employer] - Occupational Stress Questionnaire     Feeling of Stress : Patient refused   Social Connections: Unknown (12/12/2023)    Social Connection and Isolation Panel [NHANES]     Frequency of Communication with Friends and Family: More than three times a week     Frequency of Social Gatherings with Friends and Family: More than three times a week     Attends Orthodoxy Services: Patient refused     Active Member of Clubs or Organizations: Patient refused     Attends Club or Organization Meetings: Patient refused     Marital Status:    Housing Stability: Unknown (12/12/2023)    Housing Stability Vital Sign     Unable to Pay for Housing in the Last Year: No     Unstable Housing in the Last Year: No       Precautions:     Allergies as of 12/10/2023 - Reviewed 12/10/2023   Allergen Reaction Noted    Tomato (solanum lycopersicum) Hives 09/26/2013    Naproxen Hives 10/22/2012    Shrimp Other (See Comments) 03/07/2017       [facility] Assessment Details/Treatment     Patient seen for wound care consultation for gluteal cleft.   Reviewed chart for this encounter.   See Flow Sheet for findings.    Pt found lying in bed w/ family at bedside, agreeable to care at this time. Pt turned into lateral position and sacral foam dressing removed for assessment. Pt w/ defined area of partial thickness skin loss noted to the center of his gluteal cleft. Cleansed gently w/ soap and water and applied Triad for moisture barrier protection. Bariatric immerse ordered for prevention and easier turning while in bed.    RECOMMENDATIONS:  BID/PRN - gluteal cleft - cleanse gently w/ soap and water or no rinse baby wipes, do not scrub - residual Triad is OK. Pat dry and apply Triad to  affected area and leave АЛЕКСАНДР. NO FOAM DRESSINGS w/ Triad use.    Bedside nursing to place pt on immerse bed upon arrival to the floor.    Discussed POC with patient and primary nurse.   See EMR for orders & patient education.      Bedside nursing to continue care & monitoring.  Bedside nursing to maintain pressure injury prevention interventions.       12/13/23 1048   WOCN Assessment   WOCN Total Time (mins) 15   Visit Date 12/13/23   Visit Time 1048   Consult Type New   WOCN Speciality Wound   Intervention assessed;changed;applied;chart review;coordination of care;orders   Teaching on-going        Altered Skin Integrity 12/11/23 1342 Gluteal cleft Moisture associated dermatitis   Date First Assessed/Time First Assessed: 12/11/23 1342   Altered Skin Integrity Present on Admission - Did Patient arrive to the hospital with altered skin?: yes  Location: Gluteal cleft  Primary Wound Type: Moisture associated dermatitis   Wound Image    Description of Altered Skin Integrity Partial thickness tissue loss. Shallow open ulcer with a red or pink wound bed, without slough. Intact or Open/Ruptured Serum-filled blister.   Dressing Appearance Intact   Drainage Amount None   Drainage Characteristics/Odor No odor   Appearance Pink;Moist   Tissue loss description Partial thickness   Periwound Area Intact;Moist   Wound Edges Defined   Care Cleansed with:;Soap and water;Applied:;Skin Barrier

## 2023-12-13 NOTE — PLAN OF CARE
Abscess aspiration completed per MD under the care of anesthesia.  Patient tolerated procedure well. Specimens sent off to lab.  Pt to recover in PACU.  Report called.

## 2023-12-13 NOTE — SUBJECTIVE & OBJECTIVE
Interval hx:     Seen at bedside with staff MD. Continues to fever. Continues with altered mentation. Bcx negative. Vascular neurology noting that findings on MRI brain is likely artifact, and have low suspicion for infarct. Planning to go to IR today. Tentatively planning L2-pelvis decompression and fusion on 12/15.      Past Medical History:   Diagnosis Date    Acute on chronic systolic heart failure 12/13/2023    Anemia 06/04/2021    Anticoagulant long-term use     Basal ganglia hemorrhage     Left basal ganglia hemorrhage with resultant right-sided hemiparesis which has resolved.     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        Past Surgical History:   Procedure Laterality Date    CARPAL TUNNEL RELEASE Left 2/19/2020    Procedure: RELEASE, CARPAL TUNNEL LEFT;  Surgeon: Maria Luisa Mccurdy MD;  Location: [facility] OR;  Service: Orthopedics;  Laterality: Left;    COLONOSCOPY N/A 7/28/2023    Procedure: COLONOSCOPY;  Surgeon: Jaylene Alvarado MD;  Location: [facility] ENDO;  Service: Endoscopy;  Laterality: N/A;    EPIDURAL STEROID INJECTION N/A 5/2/2019    Procedure: Injection, Steroid, Epidural Cervical;  Surgeon: Dariel Doan Jr., MD;  Location: [facility] ENDO;  Service: Pain Management;  Laterality: N/A;  Cervical Epidural Steroid Injection     [id number]    Arrive @ 1130; ASA; Check BG    EPIDURAL STEROID INJECTION Bilateral 5/29/2019    Procedure: Lumbar Medial Branch Blocks;  Surgeon: Dariel  MARCOS Doan Jr., MD;  Location: [facility] ENDO;  Service: Pain Management;  Laterality: Bilateral;  Bilateral Lumbar Medial Branch Blocks L3, L4, L5    [id number]  [id number]    Attive @ 1030 (11 arrival request); NO Sedation; ASA; Check BG    EPIDURAL STEROID INJECTION Bilateral 7/3/2019    Procedure: Lumbar Medial Branch Blocks;  Surgeon: Dariel Doan Jr., MD;  Location: [facility] ENDO;  Service: Pain Management;  Laterality: Bilateral;  Bilateral Lumbar Medial Branch Blocks L3, L4, L5    [id number]  [id number]    Arrive @ 0930; NO Sedation; ASA; Check BG    EPIDURAL STEROID INJECTION N/A 8/7/2019    Procedure: Injection, Steroid, Epidural Cervical;  Surgeon: Dariel Doan Jr., MD;  Location: [facility] ENDO;  Service: Pain Management;  Laterality: N/A;  Cervical Epidural Steroid Injection C7-T1    [id number]    Arrive @ 1115; Last ASA 7/30; Check BG    ESOPHAGOGASTRODUODENOSCOPY N/A 7/28/2023    Procedure: EGD (ESOPHAGOGASTRODUODENOSCOPY);  Surgeon: Jaylene Alvarado MD;  Location: [facility] ENDO;  Service: Endoscopy;  Laterality: N/A;  inst via portal    LEFT HEART CATHETERIZATION Left 9/21/2021    Procedure: Left heart cath 9am start, R rad access;  Surgeon: Dariel Conner MD;  Location: [facility] CATH LAB;  Service: Cardiology;  Laterality: Left;  RN PRE OP Covid NEGATIVE ON  9-20-21.  C A    MIDLINE CATHETER PLACEMENT  12/12/2023    NO PAST SURGERIES         Review of patient's allergies indicates:   Allergen Reactions    Tomato (solanum lycopersicum) Hives    Naproxen Hives    Shrimp Other (See Comments)       Medications:  Medications Prior to Admission   Medication Sig    acetaminophen (TYLENOL) 650 MG TbSR Take 1,300 mg by mouth every 6 (six) hours as needed (Pain).    ascorbic acid, vitamin C, (VITAMIN C) 500 MG tablet Take 500 mg by mouth once daily.    atorvastatin (LIPITOR) 40 MG tablet Take 1 tablet (40 mg total) by mouth once daily.    cyclobenzaprine (FLEXERIL) 10 MG tablet Take 10 mg by mouth 3 (three) times daily as needed for  Muscle spasms.    docusate sodium (COLACE) 100 MG capsule Take 100 mg by mouth daily as needed for Constipation.    furosemide (LASIX) 20 MG tablet Take 1 tablet (20 mg total) by mouth 2 (two) times daily.    gabapentin (NEURONTIN) 600 MG tablet TAKE 1 TABLET(600 MG) BY MOUTH THREE TIMES DAILY AS NEEDED    HYDROcodone-acetaminophen (NORCO) 5-325 mg per tablet Take 1 tablet by mouth every 6 (six) hours as needed for Pain.    PREVIDENT 5000 SENSITIVE 1.1-5 % Pste Brush teeth every other day as directed.    tamsulosin (FLOMAX) 0.4 mg Cap Take 0.4 mg by mouth once daily.    aspirin (ECOTRIN) 81 MG EC tablet Take 81 mg by mouth once daily.    blood pressure test kit-large Kit 1 Device by Misc.(Non-Drug; Combo Route) route 2 (two) times daily.    blood sugar diagnostic Strp To check BG 2 times daily, to use with insurance preferred meter    blood-glucose meter kit To check BG 2 times daily, to use with insurance preferred meter    diltiaZEM (CARDIZEM CD) 240 MG 24 hr capsule Take 1 capsule (240 mg total) by mouth once daily. (Patient not taking: Reported on 12/11/2023)    fluticasone-salmeterol diskus inhaler 500-50 mcg Inhale 1 puff into the lungs 2 (two) times daily. Controller    lancets Misc To check BG 2 times daily, to use with insurance preferred meter    PFIZER COVID BIVAL,12Y UP,,PF, 30 mcg/0.3 mL injection      Antibiotics (From admission, onward)      Start     Stop Route Frequency Ordered    12/12/23 1700  meropenem (MERREM) 2 g in sodium chloride 0.9% 100 mL IVPB         -- IV Every 8 hours (non-standard times) 12/12/23 1537    12/11/23 2300  vancomycin 2 g in dextrose 5 % 500 mL IVPB         -- IV Every 24 hours (non-standard times) 12/11/23 0720    12/11/23 0752  vancomycin - pharmacy to dose  (vancomycin IVPB (PEDS and ADULTS))        See Matthew for full Linked Orders Report.    -- IV pharmacy to manage frequency 12/11/23 0654          Antifungals (From admission, onward)      None          Antivirals  (From admission, onward)      None             Immunization History   Administered Date(s) Administered    COVID-19, MRNA, LN-S, PF (MODERNA FULL 0.5 ML DOSE) 05/17/2022    COVID-19, MRNA, LN-S, PF (Pfizer) (Purple Cap) 09/22/2022    COVID-19, mRNA, LNP-S, PF, kim-sucrose, 30 mcg/0.3 mL (Pfizer 2023 Ages 12+) 10/12/2023    COVID-19, mRNA, LNP-S, bivalent booster, PF (Moderna Omicron)12 + YEARS 09/22/2022    COVID-19, mRNA, LNP-S, bivalent booster, PF (PFIZER OMICRON) 06/14/2023    COVID-19, vector-nr, rS-Ad26, PF (Apurva) 03/08/2021, 11/05/2021    Influenza 10/22/2012, 02/28/2014    Influenza (FLUAD) - Quadrivalent - Adjuvanted - PF *Preferred* (65+) 09/06/2023    Influenza (FLUAD) - Trivalent - Adjuvanted - PF (65+) 10/10/2018, 10/10/2018, 09/12/2019, 09/12/2019    Influenza - High Dose - PF (65 years and older) 10/23/2017, 10/23/2017    Influenza - Quadrivalent 09/23/2015    Influenza - Quadrivalent - High Dose - PF (65 years and older) 09/10/2020, 09/10/2020, 09/29/2021, 09/22/2022    Influenza - Quadrivalent - PF *Preferred* (6 months and older) 12/03/2016    Influenza - Trivalent (ADULT) 10/22/2012, 02/28/2014    Influenza Split 10/22/2012, 02/28/2014    Pneumococcal Conjugate - 13 Valent 02/19/2016, 10/10/2018, 10/10/2018    Pneumococcal Polysaccharide - 23 Valent 03/07/2017, 09/12/2019, 09/12/2019, 09/10/2020, 09/10/2020    RSVpreF (Arexvy) 10/12/2023    Tdap 02/19/2016       Family History       Problem Relation (Age of Onset)    Diabetes Paternal Grandfather    Heart disease Paternal Grandfather    Hypertension     No Known Problems Mother, Father, Sister, Brother, Maternal Aunt, Maternal Uncle, Paternal Aunt, Paternal Uncle, Maternal Grandmother, Maternal Grandfather, Paternal Grandmother          Social History     Socioeconomic History    Marital status:     Number of children: 8    Highest education level: 11th grade   Occupational History    Occupation:    Tobacco Use    Smoking  status: Never    Smokeless tobacco: Never   Substance and Sexual Activity    Alcohol use: Yes     Alcohol/week: 0.0 standard drinks of alcohol     Comment: occacionally    Drug use: No    Sexual activity: Not Currently     Social Determinants of Health     Financial Resource Strain: Low Risk  (12/12/2023)    Overall Financial Resource Strain (CARDIA)     Difficulty of Paying Living Expenses: Not very hard   Food Insecurity: No Food Insecurity (12/12/2023)    Hunger Vital Sign     Worried About Running Out of Food in the Last Year: Never true     Ran Out of Food in the Last Year: Never true   Transportation Needs: No Transportation Needs (12/12/2023)    PRAPARE - Transportation     Lack of Transportation (Medical): No     Lack of Transportation (Non-Medical): No   Physical Activity: Inactive (12/12/2023)    Exercise Vital Sign     Days of Exercise per Week: 0 days     Minutes of Exercise per Session: 0 min   Stress: Unknown (12/12/2023)    [employer] - Occupational Stress Questionnaire     Feeling of Stress : Patient refused   Social Connections: Unknown (12/12/2023)    Social Connection and Isolation Panel [NHANES]     Frequency of Communication with Friends and Family: More than three times a week     Frequency of Social Gatherings with Friends and Family: More than three times a week     Attends Anabaptist Services: Patient refused     Active Member of Clubs or Organizations: Patient refused     Attends Club or Organization Meetings: Patient refused     Marital Status:    Housing Stability: Unknown (12/12/2023)    Housing Stability Vital Sign     Unable to Pay for Housing in the Last Year: No     Unstable Housing in the Last Year: No     Review of Systems   Unable to perform ROS: Patient nonverbal     Objective:     Vital Signs (Most Recent):  Temp: (!) 102.3 °F (39.1 °C) (12/13/23 1205)  Pulse: (!) 118 (12/13/23 1212)  Resp: (!) 23 (12/13/23 1212)  BP: 124/68 (12/13/23  1148)  SpO2: 97 % (12/13/23 1212) Vital Signs (24h Range):  Temp:  [97.7 °F (36.5 °C)-102.3 °F (39.1 °C)] 102.3 °F (39.1 °C)  Pulse:  [118-146] 118  Resp:  [20-32] 23  SpO2:  [92 %-100 %] 97 %  BP: (113-141)/(65-88) 124/68     Weight: 120.2 kg (265 lb)  Body mass index is 39.13 kg/m².    Estimated Creatinine Clearance: 72.3 mL/min (based on SCr of 1.2 mg/dL).     Physical Exam  Vitals reviewed.   Constitutional:       General: He is not in acute distress.     Appearance: He is ill-appearing. He is not toxic-appearing or diaphoretic.   HENT:      Nose: Nose normal.      Mouth/Throat:      Mouth: Mucous membranes are moist.      Pharynx: Oropharynx is clear.   Cardiovascular:      Rate and Rhythm: Regular rhythm. Tachycardia present.      Pulses: Normal pulses.      Heart sounds: Normal heart sounds. No murmur heard.  Pulmonary:      Effort: Pulmonary effort is normal. No respiratory distress.      Breath sounds: Normal breath sounds. No stridor.   Abdominal:      General: Abdomen is flat. There is no distension.      Palpations: Abdomen is soft.      Tenderness: There is no abdominal tenderness. There is no guarding or rebound.   Musculoskeletal:         General: Swelling present. No deformity. Normal range of motion.      Right lower leg: No edema.      Left lower leg: No edema.   Skin:     General: Skin is warm.      Findings: No lesion or rash.   Neurological:      Mental Status: He is disoriented.      Motor: Weakness present.      Gait: Gait abnormal.          Significant Labs: All pertinent labs within the past 24 hours have been reviewed.    Significant Imaging: I have reviewed all pertinent imaging results/findings within the past 24 hours.

## 2023-12-13 NOTE — HOSPITAL COURSE
12/12: Imaging completed. He remained delirious and altered. Bx NGTD. On broad spectrum abx    12/13: Sustained HR in 120s. HS stable and BP normal. Low grade fever to 100.5. MRI completed with poor quality. No clear evidence of other OM in rest of spine but MRI suboptimal. He remained delirious and altered. Bx NGTD. On broad spectrum abx and switched to merrem.   12/14: More attentive later this afternoon. Exam remains stable. HR 120s. Temp to 102. WBC up to 16K   12/16: POD 1 s/p L1-L4 TLIF. Neuro exam decline. Intubate and MRI pan spine  12/17/2023: NAEON. AF; BP labile. Remains intubated, sedated. Exam stable. On cvEEG. MRI C/T/L w/wo completed.  12/20: NAEON. AF, 103-114 HR. FC BUE L>R and BLE wiggling toes. Wound vac and HV x2 removed today. Okay for stepdown to   12/22/23: Continued tachycardia, hypotension and increasing WBC. Primary ordering a CT c/a/p w/ IV contrast due to spike in LFTs. Patient is awake and follows simple commands. Ongoing intermittent confusion 2/2 encephalopathy. Extremities are very tender, primary considering gout flare up. Post op XR pending.   12/23: NAEO. Intermittent tachycardia, vitals otherwise stable. Exam grossly stable. On merepenem and vancomycin per primary.   12/24: NAEON. Exam stable. Still pending post op XR  12/25: NAEON, exam stable, wound well appearing

## 2023-12-13 NOTE — NURSING
Nurses Note -- 4 Eyes      12/12/2023   7:14 PM      Skin assessed during: Daily Assessment      [] No Altered Skin Integrity Present    []Prevention Measures Documented      [x] Yes- Altered Skin Integrity Present or Discovered   [] LDA Added if Not in Epic (Describe Wound)   [] New Altered Skin Integrity was Present on Admit and Documented in LDA   [] Wound Image Taken    Wound Care Consulted? Yes    Attending Nurse:  Deysi Lam RN/Staff Member:  BAILEE Landeros

## 2023-12-13 NOTE — ASSESSMENT & PLAN NOTE
Likely secondary to L2-L3 diskitis/ostemyelitis.    MRI spine completed  NSGY consulted  Pain meds PRN

## 2023-12-13 NOTE — CONSULTS
Inpatient consult to Interventional Radiology  Consult performed by: Melia Morris MD  Consult ordered by: Edilia Lau MD  Reason for consult: Diskitis  Assessment/Recommendations: Disk biopsy for culture was simultaneously performed along the psoas aspiration procedure.  See consult & procedure note from IR today for more details.

## 2023-12-13 NOTE — CONSULTS
"    Connor Mina - Neurosurgery ([facility])  Adult Nutrition  Consult Note    SUMMARY     Recommendations    1.) Recommend if and when medically able to ADAT, with goal of Diabetic diet, texture per SLP.     2.) If medically warranted and EN preferred, recommend initiating TF of Impact Peptide 1.5 and slowly advance to goal rate of 50ml/hr to provide 1800 kcal, 113g PRO, and 924ml FF- FWF per MD.      - monitor for s/s of intolerance such as residuals >500ml, n/v/d, or abdominal distension.      3.) RD to monitor wt, nutritional status, skin, labs.      Goals: to meet % of EEN/EPN by next RD f/u  Nutrition Goal Status: new  Communication of RD Recs:  (POC)    Assessment and Plan    Nutrition Problem  Inadequate oral intake    Related to (etiology):   Inability to consume adequate nutrition    Signs and Symptoms (as evidenced by):   NPO     Interventions/Recommendations (treatment strategy):  Collaboration of nutritional care with other providers.  ADAT vs EN    Nutrition Diagnosis Status:   New     Reason for Assessment    Reason For Assessment: consult  Diagnosis: infection/sepsis  Relevant Medical History: hx CVA, CKD3, HTN, DM2, COPD  Interdisciplinary Rounds: did not attend  General Information Comments: RD consulted for tentative TF recs via EN, awaiting SLP eval. Pt was not seen d/t AMS. NFPE not appropriate at this time. Nutritional hx could not be obtained. Noted that pt has had a -19% wt change since admit. Edema present upon admission. No new edema noted today. Wt changes may be r/t fluid shifts. Noted sig wt shifts throughout the past year, as per chart review. Pt is awaiting tentative spinal fusion sx. RD to continue to monitor.  Nutrition Discharge Planning: as per medical course    Nutrition Risk Screen    Nutrition Risk Screen: no indicators present    Anthropometrics    Temp: 99.6 °F (37.6 °C)  Height: 5' 9" (175.3 cm)  Height (inches): 69 in  Weight Method: Bed Scale  Weight: 120.2 kg (265 " lb)  Weight (lb): 265 lb  Ideal Body Weight (IBW), Male: 160 lb  % Ideal Body Weight, Male (lb): 165.63 %  BMI (Calculated): 39.1  BMI Grade: 35 - 39.9 - obesity - grade II    Lab/Procedures/Meds    Pertinent Labs Reviewed: reviewed  Pertinent Labs Comments: BUN: 24, alb: 1.5, tbili: 1.4, CRP: 434.4 (12/12)  Pertinent Medications Reviewed: reviewed  Pertinent Medications Comments: Statin, abx    Estimated/Assessed Needs    Weight Used For Calorie Calculations: 120.2 kg (264 lb 15.9 oz)  Energy Calorie Requirements (kcal): 1322- 1803 kcal  Energy Need Method: Kcal/kg (11-15 kcal/kg)  Protein Requirements: 110- 146g (1.5- 2.0g/kg of IBW)  Weight Used For Protein Calculations: 73 kg (160 lb 15 oz) (IBW d/t BMI >30.0)  Fluid Requirements (mL): 1ml/1kcal or per MD  Estimated Fluid Requirement Method: RDA Method  RDA Method (mL): 1322  CHO Requirement: 165- 225 g    Nutrition Prescription Ordered    Current Diet Order: NPO    Evaluation of Received Nutrient/Fluid Intake    I/O: +4.7L since admit  Energy Calories Required: not meeting needs  Protein Required: not meeting needs  Fluid Required:  (as per MD)  Comments: LBM 12/13  Tolerance:  (NPO)  % Intake of Estimated Energy Needs: 0 - 25 %  % Meal Intake: NPO    Nutrition Risk    Level of Risk/Frequency of Follow-up:  (RD to f/u x 1-2/week)     Monitor and Evaluation    Food and Nutrient Intake: energy intake  Food and Nutrient Adminstration: diet order  Physical Activity and Function: nutrition-related ADLs and IADLs  Anthropometric Measurements: weight, weight change, body mass index  Biochemical Data, Medical Tests and Procedures: electrolyte and renal panel, gastrointestinal profile, glucose/endocrine profile, inflammatory profile, lipid profile  Nutrition-Focused Physical Findings: overall appearance, skin     Nutrition Follow-Up    RD Follow-up?: Yes

## 2023-12-13 NOTE — PLAN OF CARE
Problem: Glycemic Control Impaired (Sepsis/Septic Shock)  Goal: Blood Glucose Level Within Desired Range  Outcome: Ongoing, Progressing  Will continue to check his blood glucose and medicate appropriately.      Problem: Infection Progression (Sepsis/Septic Shock)  Goal: Absence of Infection Signs and Symptoms  Outcome: Ongoing, Progressing   Continuing IV antibiotics, Vanc and Merrem.     Problem: Fluid and Electrolyte Imbalance (Acute Kidney Injury/Impairment)  Goal: Fluid and Electrolyte Balance  Outcome: Ongoing, Progressing  Magnesium replaced this am. Will continue to monitor with next lab check.     Problem: Skin Injury Risk Increased  Goal: Skin Health and Integrity  Outcome: Ongoing, Progressing  Skin tear noted in gluteal fold, cream applied and Mepilex foam in place. Q2H turns done this shift and patient's daughter at bedside, informed on plan of care to prevent further breakdown and promote healing.

## 2023-12-13 NOTE — PLAN OF CARE
Recommendations     1.) Recommend if and when medically able to ADAT, with goal of Diabetic diet, texture per SLP.      2.) If medically warranted and EN preferred, recommend initiating TF of Impact Peptide 1.5 and slowly advance to goal rate of 50ml/hr to provide 1800 kcal, 113g PRO, and 924ml FF- FWF per MD.                  - monitor for s/s of intolerance such as residuals >500ml, n/v/d, or abdominal distension.       3.) RD to monitor wt, nutritional status, skin, labs.        Goals: to meet % of EEN/EPN by next RD f/u  Nutrition Goal Status: new  Communication of RD Recs:  (POC)

## 2023-12-13 NOTE — ANESTHESIA PREPROCEDURE EVALUATION
12/13/2023      Bj Pate Jr. is a 73 y.o. male with multiple co morbidities and altered mental status here for psoas abscess drainage in IR    Patient Active Problem List   Diagnosis    Obstructive sleep apnea on CPAP: setting = 8    Morbid obesity with BMI of 45.0-49.9, adult    Chronic idiopathic gout of multiple sites    Chronic kidney disease, stage 3a    CKD (chronic kidney disease)    History of CVA (cerebrovascular accident)    History of cerebral parenchymal hemorrhage    Essential hypertension    Decreased spinal mobility    Decreased functional mobility and endurance    Cervical radiculopathy    Lumbar spondylosis    DDD (degenerative disc disease), lumbar    DDD (degenerative disc disease), cervical    Osteoarthritis of spine with radiculopathy, cervical region    Carpal tunnel syndrome on both sides    Ulnar neuropathy of both upper extremities    Left lateral epicondylitis    Left carpal tunnel syndrome    Generalized weakness    Acute kidney injury superimposed on chronic kidney disease    Transaminitis    Urinary retention    Chronic gout of left wrist    Polyarticular gout    Acute idiopathic gout of multiple sites    Urinary tract infection with hematuria    Hyperkalemia    Hyponatremia    Leukocytosis    Pseudomonas infection    Decreased strength, endurance, and mobility    Gait instability    Balance problem    Alteration in instrumental activities of daily living (IADL)    Decreased  strength    SOBOE (shortness of breath on exertion)    Anemia    Pulmonary hypertension    Mixed hyperlipidemia    Abnormal nuclear stress test    Reactive airway disease without complication    Hyperlipidemia, acquired    Aortic root dilatation    Colitis    Left hip pain    Torticollis    Acute osteomyelitis of lumbar spine    Severe sepsis    AMS (altered mental status)    Discitis     Osteomyelitis    Acute on chronic systolic heart failure    Encephalopathy acute    Malnutrition    Psoas muscle abscess    Back pain       Review of patient's allergies indicates:   Allergen Reactions    Tomato (solanum lycopersicum) Hives    Naproxen Hives    Shrimp Other (See Comments)       No current facility-administered medications on file prior to encounter.     Current Outpatient Medications on File Prior to Encounter   Medication Sig Dispense Refill    acetaminophen (TYLENOL) 650 MG TbSR Take 1,300 mg by mouth every 6 (six) hours as needed (Pain).      ascorbic acid, vitamin C, (VITAMIN C) 500 MG tablet Take 500 mg by mouth once daily.      atorvastatin (LIPITOR) 40 MG tablet Take 1 tablet (40 mg total) by mouth once daily. 90 tablet 1    cyclobenzaprine (FLEXERIL) 10 MG tablet Take 10 mg by mouth 3 (three) times daily as needed for Muscle spasms.      docusate sodium (COLACE) 100 MG capsule Take 100 mg by mouth daily as needed for Constipation.      furosemide (LASIX) 20 MG tablet Take 1 tablet (20 mg total) by mouth 2 (two) times daily. 60 tablet 0    gabapentin (NEURONTIN) 600 MG tablet TAKE 1 TABLET(600 MG) BY MOUTH THREE TIMES DAILY AS NEEDED 90 tablet 1    HYDROcodone-acetaminophen (NORCO) 5-325 mg per tablet Take 1 tablet by mouth every 6 (six) hours as needed for Pain. 10 tablet 0    PREVIDENT 5000 SENSITIVE 1.1-5 % Pste Brush teeth every other day as directed.      tamsulosin (FLOMAX) 0.4 mg Cap Take 0.4 mg by mouth once daily.      aspirin (ECOTRIN) 81 MG EC tablet Take 81 mg by mouth once daily.      blood pressure test kit-large Kit 1 Device by Misc.(Non-Drug; Combo Route) route 2 (two) times daily. 1 each 0    blood sugar diagnostic Strp To check BG 2 times daily, to use with insurance preferred meter 200 strip 3    blood-glucose meter kit To check BG 2 times daily, to use with insurance preferred meter 1 each 0    diltiaZEM (CARDIZEM CD) 240 MG 24 hr capsule Take 1 capsule (240 mg total) by  mouth once daily. (Patient not taking: Reported on 12/11/2023) 90 capsule 2    fluticasone-salmeterol diskus inhaler 500-50 mcg Inhale 1 puff into the lungs 2 (two) times daily. Controller 60 each 11    lancets Misc To check BG 2 times daily, to use with insurance preferred meter 200 each 3    PFIZER COVID BIVAL,12Y UP,,PF, 30 mcg/0.3 mL injection       [DISCONTINUED] nystatin (MYCOSTATIN) cream APPLY   TOPICALLY TO AFFECTED AREA TWICE DAILY 60 g 0       Past Surgical History:   Procedure Laterality Date    CARPAL TUNNEL RELEASE Left 2/19/2020    Procedure: RELEASE, CARPAL TUNNEL LEFT;  Surgeon: Maria Luisa Mccurdy MD;  Location: [facility] OR;  Service: Orthopedics;  Laterality: Left;    COLONOSCOPY N/A 7/28/2023    Procedure: COLONOSCOPY;  Surgeon: Jaylene Alvarado MD;  Location: [facility] ENDO;  Service: Endoscopy;  Laterality: N/A;    EPIDURAL STEROID INJECTION N/A 5/2/2019    Procedure: Injection, Steroid, Epidural Cervical;  Surgeon: Dariel Doan Jr., MD;  Location: [facility] ENDO;  Service: Pain Management;  Laterality: N/A;  Cervical Epidural Steroid Injection     51799    Arrive @ 1130; ASA; Check BG    EPIDURAL STEROID INJECTION Bilateral 5/29/2019    Procedure: Lumbar Medial Branch Blocks;  Surgeon: Dariel Doan Jr., MD;  Location: [facility];  Service: Pain Management;  Laterality: Bilateral;  Bilateral Lumbar Medial Branch Blocks L3, L4, L5    [id number]  [id number]    Attive @ 1030 (11 arrival request); NO Sedation; ASA; Check BG    EPIDURAL STEROID INJECTION Bilateral 7/3/2019    Procedure: Lumbar Medial Branch Blocks;  Surgeon: Dariel Doan Jr., MD;  Location: [facility] ENDO;  Service: Pain Management;  Laterality: Bilateral;  Bilateral Lumbar Medial Branch Blocks L3, L4, L5    [id number]  [id number]    Arrive @ 0930; NO Sedation; ASA; Check BG    EPIDURAL STEROID INJECTION N/A 8/7/2019    Procedure: Injection, Steroid, Epidural Cervical;  Surgeon: Dariel Doan Jr., MD;  Location: [facility] ENDO;  Service: Pain  Management;  Laterality: N/A;  Cervical Epidural Steroid Injection C7-T1    14826    Arrive @ 1115; Last ASA 7/30; Check BG    ESOPHAGOGASTRODUODENOSCOPY N/A 7/28/2023    Procedure: EGD (ESOPHAGOGASTRODUODENOSCOPY);  Surgeon: Jaylene Alvarado MD;  Location: [facility] ENDO;  Service: Endoscopy;  Laterality: N/A;  inst via portal    LEFT HEART CATHETERIZATION Left 9/21/2021    Procedure: Left heart cath 9am start, R rad access;  Surgeon: Dariel Conner MD;  Location: [facility] CATH LAB;  Service: Cardiology;  Laterality: Left;  RN PRE OP Covid NEGATIVE ON  9-20-21.  C A    MIDLINE CATHETER PLACEMENT  12/12/2023    NO PAST SURGERIES         Social History     Socioeconomic History    Marital status:     Number of children: 8    Highest education level: 11th grade   Occupational History    Occupation:    Tobacco Use    Smoking status: Never    Smokeless tobacco: Never   Substance and Sexual Activity    Alcohol use: Yes     Alcohol/week: 0.0 standard drinks of alcohol     Comment: occacionally    Drug use: No    Sexual activity: Not Currently     Social Determinants of Health     Financial Resource Strain: Low Risk  (12/12/2023)    Overall Financial Resource Strain (CARDIA)     Difficulty of Paying Living Expenses: Not very hard   Food Insecurity: No Food Insecurity (12/12/2023)    Hunger Vital Sign     Worried About Running Out of Food in the Last Year: Never true     Ran Out of Food in the Last Year: Never true   Transportation Needs: No Transportation Needs (12/12/2023)    PRAPARE - Transportation     Lack of Transportation (Medical): No     Lack of Transportation (Non-Medical): No   Physical Activity: Inactive (12/12/2023)    Exercise Vital Sign     Days of Exercise per Week: 0 days     Minutes of Exercise per Session: 0 min   Stress: Unknown (12/12/2023)    [employer] - Occupational Stress Questionnaire     Feeling of Stress : Patient refused   Social Connections:  Unknown (12/12/2023)    Social Connection and Isolation Panel [NHANES]     Frequency of Communication with Friends and Family: More than three times a week     Frequency of Social Gatherings with Friends and Family: More than three times a week     Attends Druze Services: Patient refused     Active Member of Clubs or Organizations: Patient refused     Attends Club or Organization Meetings: Patient refused     Marital Status:    Housing Stability: Unknown (12/12/2023)    Housing Stability Vital Sign     Unable to Pay for Housing in the Last Year: No     Unstable Housing in the Last Year: No         CBC:   Recent Labs     12/12/23  0533 12/13/23  0313 12/13/23  1212   WBC 16.18* 14.93*  --    RBC 3.65* 3.13*  --    HGB 11.0* 9.4*  --    HCT 34.3* 28.3* 29*    284  --    MCV 94 90  --    MCH 30.1 30.0  --    MCHC 32.1 33.2  --        CMP:   Recent Labs     12/12/23  0533 12/12/23 2206 12/13/23 0313    136 137   K 4.6 4.0 3.9    102 103   CO2 20* 27 25   BUN 22 24* 24*   CREATININE 1.1 1.1 1.2   GLU 91 96 97   MG 1.9  --  2.3   PHOS 3.5  --  3.2   CALCIUM 9.7 9.6 9.2   ALBUMIN 1.7* 1.6* 1.5*   PROT 6.8 6.8 6.2   ALKPHOS 77 79 70   ALT 35 32 26   AST 51* 41* 32   BILITOT 1.8* 1.5* 1.4*       INR  Recent Labs     12/13/23  0648   INR 1.1           2D Echo:  Results for orders placed or performed during the hospital encounter of 01/29/16   Echo doppler color flow   Result Value Ref Range    EF + QEF 55 55 - 65    Diastolic Dysfunction No     Est. PA Systolic Pressure 28     Mitral Valve Mobility NORMAL            Pre-op Assessment    I have reviewed the Patient Summary Reports.     I have reviewed the Nursing Notes. I have reviewed the NPO Status.      Review of Systems  Anesthesia Hx:  No problems with previous Anesthesia                Cardiovascular:     Hypertension       CHF                                 Pulmonary:   COPD     Sleep Apnea                Renal/:  Chronic Renal  Disease, CKD                Musculoskeletal:  Arthritis               Neurological:   CVA Neuromuscular Disease,                                   Endocrine:  Diabetes               Physical Exam    Airway:  Mallampati: II   Mouth Opening: Normal  Tongue: Normal    Chest/Lungs:  Normal Respiratory Rate    Heart:  Rhythm: Regular Rhythm        Anesthesia Plan  Type of Anesthesia, risks & benefits discussed:    Anesthesia Type: Gen ETT  Intra-op Monitoring Plan: Standard ASA Monitors  Induction:  IV  Informed Consent: Informed consent signed with the Patient representative and all parties understand the risks and agree with anesthesia plan.  All questions answered.   ASA Score: 4    Ready For Surgery From Anesthesia Perspective.     .

## 2023-12-13 NOTE — H&P
See IR consult dated 12/11/23. 73 y.o. male with a PMHx of CVA hx, residual R sided deficits,HILARIO on CPAP, COPD, CKD, DM who has been referred to IR for image guided percutaneous drain placement/aspiration for management of  L psoas abscess . Case discussed with staff. Pt has a very small hypodense lesion in the L psoas visualized on MRI which may be abscess vs phlegmon. It is difficult to see on CT. Can attempt aspiration of this L psoas abscess, but there is a high chance aspiration will be low yield and we will not receive determinate culture results. The procedure was discussed in great detail with the patient including thorough explanations of the potential risks and benefits of percutaneous drain placement/aspiration. Risks include sepsis, severe infection, hemorrhage, damage to surrounding structures, catheter malfunction, inability to remove catheter, and catheter dislodgment. The patient is a candidate for CT guided percutaneous drain placement/aspiration under MAC sedation. Plan discussed with ordering physician.The pt verbalized understanding of the plan and would like to proceed.    ASA: 2  Mallampati: 2     Plan:  Will proceed with CT guided percutaneous drain placement/aspiration under MAC sedation on 12/13/23  Please keep pt NPO   Primary team to order any labs/cultures to be drawn on fluid sample collected during the procedure.  Anticoagulation history reviewed.   Coagulation labs reviewed.  Thank you for the consult. Please contact with questions via [employer] secure chat or [employer]     Bette Abbott PA-C  Interventional Radiology  Spectra: [phone]

## 2023-12-13 NOTE — PLAN OF CARE
Problem: Bariatric Environmental Safety  Goal: Safety Maintained with Care  Outcome: Ongoing, Progressing     Problem: Glycemic Control Impaired (Sepsis/Septic Shock)  Goal: Blood Glucose Level Within Desired Range  Outcome: Ongoing, Progressing     Problem: Infection Progression (Sepsis/Septic Shock)  Goal: Absence of Infection Signs and Symptoms  Outcome: Ongoing, Progressing     Problem: Nutrition Impaired (Sepsis/Septic Shock)  Goal: Optimal Nutrition Intake  Outcome: Ongoing, Progressing     Problem: Fluid and Electrolyte Imbalance (Acute Kidney Injury/Impairment)  Goal: Fluid and Electrolyte Balance  Outcome: Ongoing, Progressing

## 2023-12-13 NOTE — ASSESSMENT & PLAN NOTE
Results for orders placed during the hospital encounter of 12/10/23    Echo    Interpretation Summary    Technically difficult portable study    Left Ventricle: The left ventricle is normal in size. There is concentric remodeling. Mild global hypokinesis present. There is mildly reduced systolic function with a visually estimated ejection fraction of 45 - 50%.    Right Ventricle: Normal right ventricular cavity size. Wall thickness is normal. Right ventricle wall motion  is normal. Systolic function is normal.    Pulmonary Artery: The estimated pulmonary artery systolic pressure is at least 24 mmHg.    The IVC was not well visualized.

## 2023-12-13 NOTE — PLAN OF CARE
Pt arrived to  for psoas aspiration. Pt oriented to unit and staff, Pt safely transferred from stretcher to procedural table. Fall risk reviewed and comfort measures utilized with interventions. Safety strap applied, position pillows to minimize pressure points. Blankets applied. Pt prepped and draped utilizing standard sterile technique. Patient placed on continuous monitoring, as required by sedation policy. Timeouts implemented utilizing standard universal time-out per department and facility policy. CRNA to remain at bedside with continuous monitoring. Pt resting comfortably. Denies pain/discomfort. Will continue to monitor. See flow sheets for monitoring, medication administration, and updates. patient verbalizes understanding.

## 2023-12-13 NOTE — ASSESSMENT & PLAN NOTE
Likely secondary to sepsis. Patient with history of prior CVA.     Continue antiobiotics and attempting to achieve source control with IR/NSGY   Cefepime discontinued   Neurology and vascular neurology consulted  MRI brain reviewed by vascular neurology

## 2023-12-13 NOTE — PROGRESS NOTES
Connor Mina - Neurosurgery ([facility])  [facility] Medicine  Progress Note    Patient Name: Bj Pate Jr.  MRN: [id number]  Patient Class: IP- Inpatient   Admission Date: 12/10/2023  Length of Stay: 2 days  Attending Physician: Krystian Reed MD  Primary Care Provider: Jose Antonio Foster MD        Subjective:     Principal Problem:Severe sepsis        HPI:  This is a 73M with a h/o CVA  about 8 to 10 years ago with R sided deficits, DM, HTN who was BIB from home with family with 3 weeks of abdominal pain. Seen at multiple EDS, and generally unremarkable work up as per family. Within last few days started to have progressive worsening back pain. Became acute altered 2 days ago w/, LE weakness now requiring assistance to walk, as well as urinary incontinence. Patient is A/O 0x at admit. Accompanied by Daughter, most of history from her.     Admission to ED patient was febrile at 102 F, tachycardic 130, tachypneic at 23, hypertensive at 140/72 initially satting well on room air later requiring 2 L nasal cannula.  WBCs elevated 12.74 anemia 11.0 platelets normal, sed rate 120+, BUN 27 magnesium 1.5, albumin 2.4, .1, troponin 0.126 urine trace proteinuria trace ketones lactate initially elevated 2.6 later 1.3 CT head unremarkable for acute processes CT abdomen and pelvis and MRI lumbar spine shows left psoas abscess L2-L3 osteomyleitis, and diskitis.  Neurosurgery was consulted recommendations are pending.  Received Cefepime and Vanc    Overview/Hospital Course:  Mr. Pate was admitted for abdominal pain and worsening mental status, and was found to have a collection in his left psoas muscle, diskitis, and osteomyelitis in the L2-L3 region.  He was placed on vancomycin and meropenem for continuing decline in mental status.  Patient to receive abscess drainage with Interventional Radiology today, and neurosurgery is following along with plans for decompressive surgery on 12/15/2023.  Due to his poor mental status, an  NG tube and tube feedings were ordered to assist with his nutrition status.    Interval History:  Overnight fever of 100.5, treated with Tylenol.  We he appeared uncomfortable, and in pain yesterday, and this was treated with IV morphine which seemed to alleviate most of his discomfort.  There is a slight elevation in his bilirubin at 1.4, but there was no abdominal tenderness in the right upper quadrant - no facial grimace or involuntary guarding.  He has remained persistently tachycardic with rates greater than 120.  Due to MRI findings, there was concern for a possible pontine infarct, but vascular neurology was consulted and feel that the findings are more likely to be artifact and did not explain the drastic decline in his mental status.  His RCRI was completed for his procedures upcoming this week.  Due to poor oral intake he was started on maintenance IV fluids at 50 mL/hr.  NG tube with tube feeds planned after his IR procedure today.  His CT abdomen pelvis shows some concerning signs of possible colitis versus constipation so a brown bone was ordered to alleviate any possible source of pain or discomfort as he is unable to vocalize this.     Review of Systems   Unable to perform ROS: Patient nonverbal     Objective:     Vital Signs (Most Recent):  Temp: 99 °F (37.2 °C) (12/13/23 1515)  Pulse: (!) 112 (12/13/23 1515)  Resp: (!) 24 (12/13/23 1515)  BP: 133/76 (12/13/23 1515)  SpO2: 100 % (12/13/23 1515) Vital Signs (24h Range):  Temp:  [97.7 °F (36.5 °C)-102.3 °F (39.1 °C)] 99 °F (37.2 °C)  Pulse:  [112-146] 112  Resp:  [20-32] 24  SpO2:  [92 %-100 %] 100 %  BP: (113-141)/(65-88) 133/76     Weight: 120.2 kg (264 lb 15.9 oz)  Body mass index is 39.13 kg/m².    Intake/Output Summary (Last 24 hours) at 12/13/2023 1637  Last data filed at 12/13/2023 0455  Gross per 24 hour   Intake 1624.06 ml   Output 205 ml   Net 1419.06 ml         Physical Exam  Vitals and nursing note reviewed. Exam conducted with a chaperone  present.   Constitutional:       General: He is not in acute distress.     Appearance: He is obese. He is ill-appearing.   HENT:      Head: Normocephalic and atraumatic.   Eyes:      Extraocular Movements: Extraocular movements intact.      Pupils: Pupils are equal, round, and reactive to light.   Cardiovascular:      Rate and Rhythm: Normal rate and regular rhythm.      Pulses: Normal pulses.      Heart sounds: Normal heart sounds.   Pulmonary:      Effort: Pulmonary effort is normal.      Breath sounds: Normal breath sounds.   Abdominal:      General: There is no distension.      Palpations: Abdomen is soft.      Tenderness: There is no abdominal tenderness.   Musculoskeletal:      Cervical back: Normal range of motion.      Right lower leg: Edema present.      Left lower leg: Edema present.   Skin:     General: Skin is warm and dry.   Neurological:      Motor: Weakness present.      Comments: Patient nonverbal and not following commands.   Withdraws all four extremities to noxious stimuli.             Significant Labs: All pertinent labs within the past 24 hours have been reviewed.  ABGs:   Recent Labs   Lab 12/13/23  1212   PH 7.488*   PCO2 33.4*   HCO3 25.3   POCSATURATED 96   BE 2   PO2 76*     Blood Culture:   Recent Labs   Lab 12/12/23  0033   LABBLOO No Growth to date  No Growth to date  No Growth to date  No Growth to date     CBC:   Recent Labs   Lab 12/11/23  2108 12/12/23  0533 12/13/23  0313 12/13/23  1212   WBC 16.33* 16.18* 14.93*  --    HGB 11.1* 11.0* 9.4*  --    HCT 33.5* 34.3* 28.3* 29*    221 284  --      CMP:   Recent Labs   Lab 12/12/23  0533 12/12/23  2206 12/13/23  0313    136 137   K 4.6 4.0 3.9    102 103   CO2 20* 27 25   GLU 91 96 97   BUN 22 24* 24*   CREATININE 1.1 1.1 1.2   CALCIUM 9.7 9.6 9.2   PROT 6.8 6.8 6.2   ALBUMIN 1.7* 1.6* 1.5*   BILITOT 1.8* 1.5* 1.4*   ALKPHOS 77 79 70   AST 51* 41* 32   ALT 35 32 26   ANIONGAP 13 7* 9     Magnesium:   Recent Labs    Lab 12/11/23  2108 12/12/23  0533 12/13/23  0313   MG 1.6 1.9 2.3       Significant Imaging: I have reviewed all pertinent imaging results/findings within the past 24 hours.    Assessment/Plan:      * Severe sepsis  This patient does have evidence of infective focus  My overall impression is sepsis.  Source:  Psoas Abscess, Osteomyelitis, Discitis, Possible Epidural abcess  Antibiotics given-   Antibiotics (72h ago, onward)      Start     Stop Route Frequency Ordered    12/12/23 1700  meropenem (MERREM) 2 g in sodium chloride 0.9% 100 mL IVPB         -- IV Every 8 hours (non-standard times) 12/12/23 1537    12/11/23 2300  vancomycin 2 g in dextrose 5 % 500 mL IVPB         -- IV Every 24 hours (non-standard times) 12/11/23 0720    12/11/23 0752  vancomycin - pharmacy to dose  (vancomycin IVPB (PEDS and ADULTS))        See Hyperspace for full Linked Orders Report.    -- IV pharmacy to manage frequency 12/11/23 0654          Latest lactate reviewed-  Recent Labs   Lab 12/12/23  1653 12/12/23  2206 12/13/23  0313   LACTATE 1.8 1.6 1.3       Organ dysfunction indicate    Will Not start Pressors as bp is greater than 65.   Source control achieved by: abx ands possible interventions, pending nsgy and IR recs    CTAP/MRI Lumbar spine showing L psoas collection (2.2 cm), and L2-L3 diskitis/osteomyelitis    - On Vanc, Meropenem  - Continues to have fever and tachycardia despite tylenol administration. IR planned to drain psoas collection today to help with source control.  - NSGY consulted planning on L2 to pelvis decompression on 12/15.   - ID consulted and following  - gentle IVF given HFrEF    Stercoral colitis  CT AP w rectal colitis vs constipation    - brown bomb ordered      Back pain  Likely secondary to L2-L3 diskitis/ostemyelitis.    MRI spine completed  NSGY consulted  Pain meds PRN      Psoas muscle abscess  See severe sepsis      Malnutrition  Maintenance IVFs  NG tube placement ordered  RDN consulted,  appreciate recs  Tube feeds to start after IR drainage of psoas collection      Encephalopathy acute  Likely secondary to sepsis. Patient with history of prior CVA.     Continue antiobiotics and attempting to achieve source control with IR/NSGY   Cefepime discontinued   Neurology and vascular neurology consulted  MRI brain reviewed by vascular neurology      Acute on chronic systolic heart failure  Results for orders placed during the hospital encounter of 12/10/23    Echo    Interpretation Summary    Technically difficult portable study    Left Ventricle: The left ventricle is normal in size. There is concentric remodeling. Mild global hypokinesis present. There is mildly reduced systolic function with a visually estimated ejection fraction of 45 - 50%.    Right Ventricle: Normal right ventricular cavity size. Wall thickness is normal. Right ventricle wall motion  is normal. Systolic function is normal.    Pulmonary Artery: The estimated pulmonary artery systolic pressure is at least 24 mmHg.    The IVC was not well visualized.      Osteomyelitis  See severe sepsis        Discitis  See severe sepsis      AMS (altered mental status)  likely 2/2 sepsis  Worsening, no new insults on MRI brain per Stroke    Neurology consulted, appreciate recommendations    Acute osteomyelitis of lumbar spine  See severe sepsis    Urinary retention  Rivera placed by ED, PVR now that he has external condom catheter. Due to diskitis/osteomyelitis, concern for possible spinal cord compression.    Bladder scan as needed        History of CVA (cerebrovascular accident)  No acute processes on head CT or brain MRI  Repeat MRI reviewed by Vascular Neuro, likely artifact.     Morbid obesity with BMI of 45.0-49.9, adult  Body mass index is 39.13 kg/m². Morbid obesity complicates all aspects of disease management from diagnostic modalities to treatment. Weight loss encouraged and health benefits explained to patient.           VTE Risk Mitigation  (From admission, onward)           Ordered     IP VTE HIGH RISK PATIENT  Once         12/11/23 0652     Place sequential compression device  Until discontinued         12/11/23 0652                    Discharge Planning   GLORIA: 12/15/2023     Code Status: Full Code   Is the patient medically ready for discharge?: No    Reason for patient still in hospital (select all that apply): Patient unstable  Discharge Plan A: Skilled Nursing Facility                  Dick Meza MD  Department of Hospital Medicine   [facility] - Neurosurgery ([facility])

## 2023-12-13 NOTE — ANESTHESIA PROCEDURE NOTES
Intubation    Date/Time: 12/13/2023 4:17 PM    Performed by: Gloria Uribe CRNA  Authorized by: Chester Gottlieb MD    Intubation:     Induction:  Intravenous    Intubated:  Postinduction    Mask Ventilation:  Moderately difficult with oral airway    Attempts:  1    Attempted By:  CRNA    Method of Intubation:  Video laryngoscopy    Blade:  Mcgarry 3    Laryngeal View Grade: Grade I - full view of cords      Difficult Airway Encountered?: No      Complications:  None    Airway Device:  Oral endotracheal tube    Airway Device Size:  7.5    Style/Cuff Inflation:  Cuffed    Inflation Amount (mL):  7    Tube secured:  22    Secured at:  The lips    Placement Verified By:  Capnometry    Complicating Factors:  None    Findings Post-Intubation:  BS equal bilateral and atraumatic/condition of teeth unchanged

## 2023-12-13 NOTE — SUBJECTIVE & OBJECTIVE
Interval History:  Overnight fever of 100.5, treated with Tylenol.  We he appeared uncomfortable, and in pain yesterday, and this was treated with IV morphine which seemed to alleviate most of his discomfort.  There is a slight elevation in his bilirubin at 1.4, but there was no abdominal tenderness in the right upper quadrant - no facial grimace or involuntary guarding.  He has remained persistently tachycardic with rates greater than 120.  Due to MRI findings, there was concern for a possible pontine infarct, but vascular neurology was consulted and feel that the findings are more likely to be artifact and did not explain the drastic decline in his mental status.  His RCRI was completed for his procedures upcoming this week.  Due to poor oral intake he was started on maintenance IV fluids at 50 mL/hr.  NG tube with tube feeds planned after his IR procedure today.  His CT abdomen pelvis shows some concerning signs of possible colitis versus constipation so a brown bone was ordered to alleviate any possible source of pain or discomfort as he is unable to vocalize this.     Review of Systems   Unable to perform ROS: Patient nonverbal     Objective:     Vital Signs (Most Recent):  Temp: 99 °F (37.2 °C) (12/13/23 1515)  Pulse: (!) 112 (12/13/23 1515)  Resp: (!) 24 (12/13/23 1515)  BP: 133/76 (12/13/23 1515)  SpO2: 100 % (12/13/23 1515) Vital Signs (24h Range):  Temp:  [97.7 °F (36.5 °C)-102.3 °F (39.1 °C)] 99 °F (37.2 °C)  Pulse:  [112-146] 112  Resp:  [20-32] 24  SpO2:  [92 %-100 %] 100 %  BP: (113-141)/(65-88) 133/76     Weight: 120.2 kg (264 lb 15.9 oz)  Body mass index is 39.13 kg/m².    Intake/Output Summary (Last 24 hours) at 12/13/2023 1637  Last data filed at 12/13/2023 0455  Gross per 24 hour   Intake 1624.06 ml   Output 205 ml   Net 1419.06 ml         Physical Exam  Vitals and nursing note reviewed. Exam conducted with a chaperone present.   Constitutional:       General: He is not in acute distress.      Appearance: He is obese. He is ill-appearing.   HENT:      Head: Normocephalic and atraumatic.   Eyes:      Extraocular Movements: Extraocular movements intact.      Pupils: Pupils are equal, round, and reactive to light.   Cardiovascular:      Rate and Rhythm: Normal rate and regular rhythm.      Pulses: Normal pulses.      Heart sounds: Normal heart sounds.   Pulmonary:      Effort: Pulmonary effort is normal.      Breath sounds: Normal breath sounds.   Abdominal:      General: There is no distension.      Palpations: Abdomen is soft.      Tenderness: There is no abdominal tenderness.   Musculoskeletal:      Cervical back: Normal range of motion.      Right lower leg: Edema present.      Left lower leg: Edema present.   Skin:     General: Skin is warm and dry.   Neurological:      Motor: Weakness present.      Comments: Patient nonverbal and not following commands.   Withdraws all four extremities to noxious stimuli.             Significant Labs: All pertinent labs within the past 24 hours have been reviewed.  ABGs:   Recent Labs   Lab 12/13/23  1212   PH 7.488*   PCO2 33.4*   HCO3 25.3   POCSATURATED 96   BE 2   PO2 76*     Blood Culture:   Recent Labs   Lab 12/12/23  0033   LABBLOO No Growth to date  No Growth to date  No Growth to date  No Growth to date     CBC:   Recent Labs   Lab 12/11/23 2108 12/12/23 0533 12/13/23  0313 12/13/23  1212   WBC 16.33* 16.18* 14.93*  --    HGB 11.1* 11.0* 9.4*  --    HCT 33.5* 34.3* 28.3* 29*    221 284  --      CMP:   Recent Labs   Lab 12/12/23 0533 12/12/23  2206 12/13/23  0313    136 137   K 4.6 4.0 3.9    102 103   CO2 20* 27 25   GLU 91 96 97   BUN 22 24* 24*   CREATININE 1.1 1.1 1.2   CALCIUM 9.7 9.6 9.2   PROT 6.8 6.8 6.2   ALBUMIN 1.7* 1.6* 1.5*   BILITOT 1.8* 1.5* 1.4*   ALKPHOS 77 79 70   AST 51* 41* 32   ALT 35 32 26   ANIONGAP 13 7* 9     Magnesium:   Recent Labs   Lab 12/11/23 2108 12/12/23 0533 12/13/23  0313   MG 1.6 1.9 2.3        Significant Imaging: I have reviewed all pertinent imaging results/findings within the past 24 hours.

## 2023-12-13 NOTE — ASSESSMENT & PLAN NOTE
72 yo male with PMH of CVA, CKD3, HTN, DM2, COPD who presents with increasing lethargy and confusion, who sustained a fall, and then reported urinary retention, lower extremity weakness.     MRI lumbar spine noting L2-3 discitis and possible L3-4 osteo, as well as left psoas abscess (2cm). Unable to exclude epidural abscess. MRI of brain, cervical spine, thoracic spine limited study due to motion, vascular neurology though noting low suspicion for infarct based on findings. Blood cultures from admit are negative thus far.     Pt remains febrile, tachycardic. White count slightly decreased to 15k today. Continues to be altered. IR planning psoas abscess aspiration later today, NSGY planning lumbar decompression and fusion tentatively tomorrow. Pt escalated to meropenem yesterday and continues on vanc.     Recommendations:  - continue vanc. Inpatient pharm to dose. Trough goal 15-20  - continue meropenem 2g IV q8hr.   - will follow culture data and tailor antibiotics as needed   - please send specimens for path, gram stain, aerobic, anaerobic, AFB and fungal cultures   - will follow surgical plans   - pt seen and plan reviewed with ID staff. ID will follow.

## 2023-12-13 NOTE — ASSESSMENT & PLAN NOTE
Maintenance IVFs  NG tube placement ordered  RDN consulted, appreciate recs  Tube feeds to start after IR drainage of psoas collection

## 2023-12-13 NOTE — CARE UPDATE
RAPID RESPONSE NURSE ROUND       Rounding completed with charge RNDarling for elevated WBC and Tachycardia reports WBC while elevated are trending . No additional concerns verbalized at this time. Instructed to call [phone] for further concerns or assistance.

## 2023-12-13 NOTE — ASSESSMENT & PLAN NOTE
Rivera placed by ED, PVR now that he has external condom catheter. Due to diskitis/osteomyelitis, concern for possible spinal cord compression.    Bladder scan as needed

## 2023-12-14 ENCOUNTER — ANESTHESIA EVENT (OUTPATIENT)
Dept: SURGERY | Facility: HOSPITAL | Age: 73
DRG: 853 | End: 2023-12-14
Payer: MEDICARE

## 2023-12-14 ENCOUNTER — OUTPATIENT CASE MANAGEMENT (OUTPATIENT)
Dept: ADMINISTRATIVE | Facility: OTHER | Age: 73
End: 2023-12-14
Payer: MEDICARE

## 2023-12-14 PROBLEM — G81.90 HEMIPARESIS: Status: ACTIVE | Noted: 2023-12-14

## 2023-12-14 PROBLEM — K62.89 PROCTITIS: Status: ACTIVE | Noted: 2023-12-14

## 2023-12-14 LAB
ABO + RH BLD: NORMAL
ALBUMIN SERPL BCP-MCNC: 1.5 G/DL (ref 3.5–5.2)
ALP SERPL-CCNC: 81 U/L (ref 55–135)
ALT SERPL W/O P-5'-P-CCNC: 31 U/L (ref 10–44)
ANION GAP SERPL CALC-SCNC: 9 MMOL/L (ref 8–16)
APPEARANCE FLD: NORMAL
APTT PPP: 25.9 SEC (ref 21–32)
AST SERPL-CCNC: 61 U/L (ref 10–40)
BASOPHILS # BLD AUTO: 0.01 K/UL (ref 0–0.2)
BASOPHILS NFR BLD: 0.1 % (ref 0–1.9)
BILIRUB SERPL-MCNC: 1.2 MG/DL (ref 0.1–1)
BLD GP AB SCN CELLS X3 SERPL QL: NORMAL
BODY FLD TYPE: NORMAL
BUN SERPL-MCNC: 34 MG/DL (ref 8–23)
CALCIUM SERPL-MCNC: 9.8 MG/DL (ref 8.7–10.5)
CHLORIDE SERPL-SCNC: 106 MMOL/L (ref 95–110)
CO2 SERPL-SCNC: 24 MMOL/L (ref 23–29)
COLOR FLD: NORMAL
CREAT SERPL-MCNC: 1.2 MG/DL (ref 0.5–1.4)
DIFFERENTIAL METHOD BLD: ABNORMAL
EOSINOPHIL # BLD AUTO: 0 K/UL (ref 0–0.5)
EOSINOPHIL NFR BLD: 0 % (ref 0–8)
ERYTHROCYTE [DISTWIDTH] IN BLOOD BY AUTOMATED COUNT: 14.2 % (ref 11.5–14.5)
EST. GFR  (NO RACE VARIABLE): >60 ML/MIN/1.73 M^2
GLUCOSE SERPL-MCNC: 125 MG/DL (ref 70–110)
HCT VFR BLD AUTO: 31.2 % (ref 40–54)
HGB BLD-MCNC: 10.2 G/DL (ref 14–18)
IMM GRANULOCYTES # BLD AUTO: 0.17 K/UL (ref 0–0.04)
IMM GRANULOCYTES NFR BLD AUTO: 1 % (ref 0–0.5)
INR PPP: 1 (ref 0.8–1.2)
LYMPHOCYTES # BLD AUTO: 0.6 K/UL (ref 1–4.8)
LYMPHOCYTES NFR BLD: 3.2 % (ref 18–48)
LYMPHOCYTES NFR FLD MANUAL: 5 %
MAGNESIUM SERPL-MCNC: 2.3 MG/DL (ref 1.6–2.6)
MCH RBC QN AUTO: 30.8 PG (ref 27–31)
MCHC RBC AUTO-ENTMCNC: 32.7 G/DL (ref 32–36)
MCV RBC AUTO: 94 FL (ref 82–98)
MONOCYTES # BLD AUTO: 1.4 K/UL (ref 0.3–1)
MONOCYTES NFR BLD: 8 % (ref 4–15)
MONOS+MACROS NFR FLD MANUAL: 7 %
NEUTROPHILS # BLD AUTO: 15.6 K/UL (ref 1.8–7.7)
NEUTROPHILS NFR BLD: 87.7 % (ref 38–73)
NEUTROPHILS NFR FLD MANUAL: 88 %
NRBC BLD-RTO: 0 /100 WBC
PHOSPHATE SERPL-MCNC: 3.7 MG/DL (ref 2.7–4.5)
PLATELET # BLD AUTO: 341 K/UL (ref 150–450)
PMV BLD AUTO: 10.7 FL (ref 9.2–12.9)
POCT GLUCOSE: 128 MG/DL (ref 70–110)
POCT GLUCOSE: 154 MG/DL (ref 70–110)
POCT GLUCOSE: 164 MG/DL (ref 70–110)
POCT GLUCOSE: 165 MG/DL (ref 70–110)
POCT GLUCOSE: 95 MG/DL (ref 70–110)
POTASSIUM SERPL-SCNC: 4.4 MMOL/L (ref 3.5–5.1)
PROT SERPL-MCNC: 6.8 G/DL (ref 6–8.4)
PROTHROMBIN TIME: 10.7 SEC (ref 9–12.5)
RBC # BLD AUTO: 3.31 M/UL (ref 4.6–6.2)
SODIUM SERPL-SCNC: 139 MMOL/L (ref 136–145)
SPECIMEN OUTDATE: NORMAL
WBC # BLD AUTO: 17.74 K/UL (ref 3.9–12.7)
WBC # FLD: 521 /CU MM

## 2023-12-14 PROCEDURE — 99233 SBSQ HOSP IP/OBS HIGH 50: CPT | Mod: ,,, | Performed by: REGISTERED NURSE

## 2023-12-14 PROCEDURE — 63600175 PHARM REV CODE 636 W HCPCS

## 2023-12-14 PROCEDURE — 63600175 PHARM REV CODE 636 W HCPCS: Performed by: STUDENT IN AN ORGANIZED HEALTH CARE EDUCATION/TRAINING PROGRAM

## 2023-12-14 PROCEDURE — 94660 CPAP INITIATION&MGMT: CPT

## 2023-12-14 PROCEDURE — 85025 COMPLETE CBC W/AUTO DIFF WBC: CPT

## 2023-12-14 PROCEDURE — 80053 COMPREHEN METABOLIC PANEL: CPT

## 2023-12-14 PROCEDURE — 85610 PROTHROMBIN TIME: CPT | Performed by: STUDENT IN AN ORGANIZED HEALTH CARE EDUCATION/TRAINING PROGRAM

## 2023-12-14 PROCEDURE — 25000003 PHARM REV CODE 250

## 2023-12-14 PROCEDURE — 84100 ASSAY OF PHOSPHORUS: CPT

## 2023-12-14 PROCEDURE — 92610 EVALUATE SWALLOWING FUNCTION: CPT

## 2023-12-14 PROCEDURE — 85730 THROMBOPLASTIN TIME PARTIAL: CPT | Performed by: STUDENT IN AN ORGANIZED HEALTH CARE EDUCATION/TRAINING PROGRAM

## 2023-12-14 PROCEDURE — 94640 AIRWAY INHALATION TREATMENT: CPT

## 2023-12-14 PROCEDURE — 25000242 PHARM REV CODE 250 ALT 637 W/ HCPCS: Performed by: STUDENT IN AN ORGANIZED HEALTH CARE EDUCATION/TRAINING PROGRAM

## 2023-12-14 PROCEDURE — 99900035 HC TECH TIME PER 15 MIN (STAT)

## 2023-12-14 PROCEDURE — 86850 RBC ANTIBODY SCREEN: CPT | Performed by: STUDENT IN AN ORGANIZED HEALTH CARE EDUCATION/TRAINING PROGRAM

## 2023-12-14 PROCEDURE — 25000003 PHARM REV CODE 250: Performed by: STUDENT IN AN ORGANIZED HEALTH CARE EDUCATION/TRAINING PROGRAM

## 2023-12-14 PROCEDURE — 11000001 HC ACUTE MED/SURG PRIVATE ROOM

## 2023-12-14 PROCEDURE — 36415 COLL VENOUS BLD VENIPUNCTURE: CPT

## 2023-12-14 PROCEDURE — 83735 ASSAY OF MAGNESIUM: CPT

## 2023-12-14 PROCEDURE — 94761 N-INVAS EAR/PLS OXIMETRY MLT: CPT

## 2023-12-14 RX ORDER — AMOXICILLIN 250 MG
1 CAPSULE ORAL DAILY
Status: DISCONTINUED | OUTPATIENT
Start: 2023-12-14 | End: 2023-12-20

## 2023-12-14 RX ORDER — POLYETHYLENE GLYCOL 3350 17 G/17G
17 POWDER, FOR SOLUTION ORAL 2 TIMES DAILY
Status: DISCONTINUED | OUTPATIENT
Start: 2023-12-14 | End: 2023-12-18

## 2023-12-14 RX ORDER — INSULIN ASPART 100 [IU]/ML
0-5 INJECTION, SOLUTION INTRAVENOUS; SUBCUTANEOUS EVERY 6 HOURS PRN
Status: DISCONTINUED | OUTPATIENT
Start: 2023-12-14 | End: 2023-12-20

## 2023-12-14 RX ADMIN — VANCOMYCIN HYDROCHLORIDE 1750 MG: 500 INJECTION, POWDER, LYOPHILIZED, FOR SOLUTION INTRAVENOUS at 10:12

## 2023-12-14 RX ADMIN — SODIUM CHLORIDE 500 ML: 9 INJECTION, SOLUTION INTRAVENOUS at 04:12

## 2023-12-14 RX ADMIN — ATORVASTATIN CALCIUM 40 MG: 40 TABLET, FILM COATED ORAL at 10:12

## 2023-12-14 RX ADMIN — BE HEALTH MAGNESIUM CITRATE ORAL SOLUTION - LEMON 296 ML: 1.75 LIQUID ORAL at 04:12

## 2023-12-14 RX ADMIN — Medication 100 ML: at 04:12

## 2023-12-14 RX ADMIN — MEROPENEM 2 G: 1 INJECTION INTRAVENOUS at 02:12

## 2023-12-14 RX ADMIN — GABAPENTIN 600 MG: 300 CAPSULE ORAL at 10:12

## 2023-12-14 RX ADMIN — GABAPENTIN 600 MG: 300 CAPSULE ORAL at 02:12

## 2023-12-14 RX ADMIN — POLYETHYLENE GLYCOL 3350 17 G: 17 POWDER, FOR SOLUTION ORAL at 10:12

## 2023-12-14 RX ADMIN — MEROPENEM 2 G: 1 INJECTION INTRAVENOUS at 04:12

## 2023-12-14 RX ADMIN — SENNOSIDES AND DOCUSATE SODIUM 1 TABLET: 8.6; 5 TABLET ORAL at 02:12

## 2023-12-14 RX ADMIN — MEROPENEM 2 G: 1 INJECTION INTRAVENOUS at 10:12

## 2023-12-14 RX ADMIN — FLUTICASONE FUROATE AND VILANTEROL TRIFENATATE 1 PUFF: 200; 25 POWDER RESPIRATORY (INHALATION) at 09:12

## 2023-12-14 NOTE — ASSESSMENT & PLAN NOTE
This patient does have evidence of infective focus  My overall impression is sepsis.  Source:  Psoas Abscess, Osteomyelitis, Discitis, Possible Epidural abcess  Antibiotics given-   Antibiotics (72h ago, onward)      Start     Stop Route Frequency Ordered    12/14/23 0900  vancomycin 1.75 g in 5 % dextrose 500 mL IVPB         -- IV Every 24 hours (non-standard times) 12/13/23 2212    12/12/23 1700  meropenem (MERREM) 2 g in sodium chloride 0.9% 100 mL IVPB         -- IV Every 8 hours (non-standard times) 12/12/23 1537    12/11/23 0752  vancomycin - pharmacy to dose  (vancomycin IVPB (PEDS and ADULTS))        See Hyperspace for full Linked Orders Report.    -- IV pharmacy to manage frequency 12/11/23 0654          Latest lactate reviewed-  Recent Labs   Lab 12/12/23  1653 12/12/23  2206 12/13/23  0313   LACTATE 1.8 1.6 1.3       Organ dysfunction indicate    Will Not start Pressors as bp is greater than 65.   Source control achieved by: abx     CTAP/MRI Lumbar spine showing L psoas collection (2.2 cm), and L2-L3 diskitis/osteomyelitis    - On Vancomycin, Meropenem  - Following biopsy of disc by IR  - Continues to have fever and tachycardia despite tylenol administration  - NSGY consulted planning on L2 to pelvis decompression on 12/15.   - ID consulted and following

## 2023-12-14 NOTE — ASSESSMENT & PLAN NOTE
Rivera placed by ED, PVR now that he has external condom catheter. Due to diskitis/osteomyelitis, concern for possible spinal cord compression.    Bladder scan as needed  Document PVRs

## 2023-12-14 NOTE — SUBJECTIVE & OBJECTIVE
Interval hx:     S/p IR aspiration attempt, unable to get fluid, sp L2-L3 disc space biopsy, pending. Pending NSGY plans. Tmax 102.3 yesterday, afebrile thus far today. Continues with leukocytosis. On meropenem/vanc. Daughter at bedside and feels pt seems slightly more alert and is responding to her voice, although still remains altered.     Past Medical History:   Diagnosis Date    Acute on chronic systolic heart failure 12/13/2023    Anemia 06/04/2021    Anticoagulant long-term use     Basal ganglia hemorrhage     Left basal ganglia hemorrhage with resultant right-sided hemiparesis which has resolved.     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        Past Surgical History:   Procedure Laterality Date    CARPAL TUNNEL RELEASE Left 2/19/2020    Procedure: RELEASE, CARPAL TUNNEL LEFT;  Surgeon: Maria Luisa Mccurdy MD;  Location: [facility] OR;  Service: Orthopedics;  Laterality: Left;    COLONOSCOPY N/A 7/28/2023    Procedure: COLONOSCOPY;  Surgeon: Jaylene Alvarado MD;  Location: [facility] ENDO;  Service: Endoscopy;  Laterality: N/A;    EPIDURAL STEROID INJECTION N/A 5/2/2019    Procedure: Injection, Steroid, Epidural Cervical;  Surgeon: Dariel Doan Jr., MD;  Location: [facility] ENDO;  Service: Pain Management;  Laterality: N/A;  Cervical Epidural Steroid Injection     40049    Arrive @ 1130; ASA; Check BG    EPIDURAL STEROID INJECTION Bilateral 5/29/2019    Procedure: Lumbar Medial Branch  Blocks;  Surgeon: Dariel Doan Jr., MD;  Location: [facility] ENDO;  Service: Pain Management;  Laterality: Bilateral;  Bilateral Lumbar Medial Branch Blocks L3, L4, L5    [id number]  [id number]    Attive @ 1030 (11 arrival request); NO Sedation; ASA; Check BG    EPIDURAL STEROID INJECTION Bilateral 7/3/2019    Procedure: Lumbar Medial Branch Blocks;  Surgeon: Dariel Doan Jr., MD;  Location: [facility] ENDO;  Service: Pain Management;  Laterality: Bilateral;  Bilateral Lumbar Medial Branch Blocks L3, L4, L5    [id number]  [id number]    Arrive @ 0930; NO Sedation; ASA; Check BG    EPIDURAL STEROID INJECTION N/A 8/7/2019    Procedure: Injection, Steroid, Epidural Cervical;  Surgeon: Dariel Doan Jr., MD;  Location: [facility] ENDO;  Service: Pain Management;  Laterality: N/A;  Cervical Epidural Steroid Injection C7-T1    24629    Arrive @ 1115; Last ASA 7/30; Check BG    ESOPHAGOGASTRODUODENOSCOPY N/A 7/28/2023    Procedure: EGD (ESOPHAGOGASTRODUODENOSCOPY);  Surgeon: Jaylene Alvarado MD;  Location: [facility] ENDO;  Service: Endoscopy;  Laterality: N/A;  inst via portal    LEFT HEART CATHETERIZATION Left 9/21/2021    Procedure: Left heart cath 9am start, R rad access;  Surgeon: Dariel Conner MD;  Location: [facility] CATH LAB;  Service: Cardiology;  Laterality: Left;  RN PRE OP Covid NEGATIVE ON  9-20-21.  C A    MIDLINE CATHETER PLACEMENT  12/12/2023    NO PAST SURGERIES         Review of patient's allergies indicates:   Allergen Reactions    Tomato (solanum lycopersicum) Hives    Naproxen Hives    Shrimp Other (See Comments)       Medications:  Medications Prior to Admission   Medication Sig    acetaminophen (TYLENOL) 650 MG TbSR Take 1,300 mg by mouth every 6 (six) hours as needed (Pain).    ascorbic acid, vitamin C, (VITAMIN C) 500 MG tablet Take 500 mg by mouth once daily.    atorvastatin (LIPITOR) 40 MG tablet Take 1 tablet (40 mg total) by mouth once daily.    cyclobenzaprine (FLEXERIL) 10 MG tablet Take 10 mg by mouth 3 (three)  times daily as needed for Muscle spasms.    docusate sodium (COLACE) 100 MG capsule Take 100 mg by mouth daily as needed for Constipation.    furosemide (LASIX) 20 MG tablet Take 1 tablet (20 mg total) by mouth 2 (two) times daily.    gabapentin (NEURONTIN) 600 MG tablet TAKE 1 TABLET(600 MG) BY MOUTH THREE TIMES DAILY AS NEEDED    HYDROcodone-acetaminophen (NORCO) 5-325 mg per tablet Take 1 tablet by mouth every 6 (six) hours as needed for Pain.    PREVIDENT 5000 SENSITIVE 1.1-5 % Pste Brush teeth every other day as directed.    tamsulosin (FLOMAX) 0.4 mg Cap Take 0.4 mg by mouth once daily.    aspirin (ECOTRIN) 81 MG EC tablet Take 81 mg by mouth once daily.    blood pressure test kit-large Kit 1 Device by Misc.(Non-Drug; Combo Route) route 2 (two) times daily.    blood sugar diagnostic Strp To check BG 2 times daily, to use with insurance preferred meter    blood-glucose meter kit To check BG 2 times daily, to use with insurance preferred meter    diltiaZEM (CARDIZEM CD) 240 MG 24 hr capsule Take 1 capsule (240 mg total) by mouth once daily. (Patient not taking: Reported on 12/11/2023)    fluticasone-salmeterol diskus inhaler 500-50 mcg Inhale 1 puff into the lungs 2 (two) times daily. Controller    lancets Misc To check BG 2 times daily, to use with insurance preferred meter    PFIZER COVID BIVAL,12Y UP,,PF, 30 mcg/0.3 mL injection      Antibiotics (From admission, onward)      Start     Stop Route Frequency Ordered    12/14/23 0900  vancomycin 1.75 g in 5 % dextrose 500 mL IVPB         -- IV Every 24 hours (non-standard times) 12/13/23 2212    12/12/23 1700  meropenem (MERREM) 2 g in sodium chloride 0.9% 100 mL IVPB         -- IV Every 8 hours (non-standard times) 12/12/23 1537    12/11/23 0752  vancomycin - pharmacy to dose  (vancomycin IVPB (PEDS and ADULTS))        See Matthew for full Linked Orders Report.    -- IV pharmacy to manage frequency 12/11/23 0654          Antifungals (From admission, onward)       None          Antivirals (From admission, onward)      None             Immunization History   Administered Date(s) Administered    COVID-19, MRNA, LN-S, PF (MODERNA FULL 0.5 ML DOSE) 05/17/2022    COVID-19, MRNA, LN-S, PF (Pfizer) (Purple Cap) 09/22/2022    COVID-19, mRNA, LNP-S, PF, kim-sucrose, 30 mcg/0.3 mL (Pfizer 2023 Ages 12+) 10/12/2023    COVID-19, mRNA, LNP-S, bivalent booster, PF (Moderna Omicron)12 + YEARS 09/22/2022    COVID-19, mRNA, LNP-S, bivalent booster, PF (PFIZER OMICRON) 06/14/2023    COVID-19, vector-nr, rS-Ad26, PF (Apurva) 03/08/2021, 11/05/2021    Influenza 10/22/2012, 02/28/2014    Influenza (FLUAD) - Quadrivalent - Adjuvanted - PF *Preferred* (65+) 09/06/2023    Influenza (FLUAD) - Trivalent - Adjuvanted - PF (65+) 10/10/2018, 10/10/2018, 09/12/2019, 09/12/2019    Influenza - High Dose - PF (65 years and older) 10/23/2017, 10/23/2017    Influenza - Quadrivalent 09/23/2015    Influenza - Quadrivalent - High Dose - PF (65 years and older) 09/10/2020, 09/10/2020, 09/29/2021, 09/22/2022    Influenza - Quadrivalent - PF *Preferred* (6 months and older) 12/03/2016    Influenza - Trivalent (ADULT) 10/22/2012, 02/28/2014    Influenza Split 10/22/2012, 02/28/2014    Pneumococcal Conjugate - 13 Valent 02/19/2016, 10/10/2018, 10/10/2018    Pneumococcal Polysaccharide - 23 Valent 03/07/2017, 09/12/2019, 09/12/2019, 09/10/2020, 09/10/2020    RSVpreF (Arexvy) 10/12/2023    Tdap 02/19/2016       Family History       Problem Relation (Age of Onset)    Diabetes Paternal Grandfather    Heart disease Paternal Grandfather    Hypertension     No Known Problems Mother, Father, Sister, Brother, Maternal Aunt, Maternal Uncle, Paternal Aunt, Paternal Uncle, Maternal Grandmother, Maternal Grandfather, Paternal Grandmother          Social History     Socioeconomic History    Marital status:     Number of children: 8    Highest education level: 11th grade   Occupational History    Occupation: [employer]     Tobacco Use    Smoking status: Never    Smokeless tobacco: Never   Substance and Sexual Activity    Alcohol use: Yes     Alcohol/week: 0.0 standard drinks of alcohol     Comment: occacionally    Drug use: No    Sexual activity: Not Currently     Social Determinants of Health     Financial Resource Strain: Low Risk  (12/12/2023)    Overall Financial Resource Strain (CARDIA)     Difficulty of Paying Living Expenses: Not very hard   Food Insecurity: No Food Insecurity (12/12/2023)    Hunger Vital Sign     Worried About Running Out of Food in the Last Year: Never true     Ran Out of Food in the Last Year: Never true   Transportation Needs: No Transportation Needs (12/12/2023)    PRAPARE - Transportation     Lack of Transportation (Medical): No     Lack of Transportation (Non-Medical): No   Physical Activity: Inactive (12/12/2023)    Exercise Vital Sign     Days of Exercise per Week: 0 days     Minutes of Exercise per Session: 0 min   Stress: Unknown (12/12/2023)    [employer] [place] [employer] - Occupational Stress Questionnaire     Feeling of Stress : Patient refused   Social Connections: Unknown (12/12/2023)    Social Connection and Isolation Panel [NHANES]     Frequency of Communication with Friends and Family: More than three times a week     Frequency of Social Gatherings with Friends and Family: More than three times a week     Attends Taoist Services: Patient refused     Active Member of Clubs or Organizations: Patient refused     Attends Club or Organization Meetings: Patient refused     Marital Status:    Housing Stability: Unknown (12/12/2023)    Housing Stability Vital Sign     Unable to Pay for Housing in the Last Year: No     Unstable Housing in the Last Year: No     Review of Systems   Unable to perform ROS: Patient nonverbal     Objective:     Vital Signs (Most Recent):  Temp: 98.7 °F (37.1 °C) (12/14/23 0734)  Pulse: 76 (12/14/23 0915)  Resp: 20 (12/14/23 0915)  BP: (!)  157/89 (12/14/23 2999)  SpO2: (!) 94 % (12/14/23 6186) Vital Signs (24h Range):  Temp:  [97.7 °F (36.5 °C)-102.3 °F (39.1 °C)] 98.7 °F (37.1 °C)  Pulse:  [] 76  Resp:  [16-32] 20  SpO2:  [94 %-100 %] 94 %  BP: (118-157)/(65-94) 157/89     Weight: 120.2 kg (264 lb 15.9 oz)  Body mass index is 39.13 kg/m².    Estimated Creatinine Clearance: 72.3 mL/min (based on SCr of 1.2 mg/dL).     Physical Exam  Vitals reviewed.   Constitutional:       General: He is not in acute distress.     Appearance: He is ill-appearing. He is not toxic-appearing or diaphoretic.   HENT:      Nose: Nose normal.      Mouth/Throat:      Mouth: Mucous membranes are moist.      Pharynx: Oropharynx is clear.   Cardiovascular:      Rate and Rhythm: Regular rhythm. Tachycardia present.      Pulses: Normal pulses.      Heart sounds: Normal heart sounds. No murmur heard.  Pulmonary:      Effort: Pulmonary effort is normal. No respiratory distress.      Breath sounds: Normal breath sounds. No stridor.   Abdominal:      General: Abdomen is flat. There is no distension.      Palpations: Abdomen is soft.      Tenderness: There is no abdominal tenderness. There is no guarding or rebound.   Musculoskeletal:         General: Swelling present. No deformity. Normal range of motion.      Right lower leg: No edema.      Left lower leg: No edema.   Skin:     General: Skin is warm.      Findings: No lesion or rash.   Neurological:      Mental Status: He is disoriented.      Motor: Weakness present.      Gait: Gait abnormal.          Significant Labs: All pertinent labs within the past 24 hours have been reviewed.    Significant Imaging: I have reviewed all pertinent imaging results/findings within the past 24 hours.

## 2023-12-14 NOTE — NURSING
Nurses Note -- 4 Eyes      12/14/2023   3:29 PM      Skin assessed during: Q Shift Change      [x] No Altered Skin Integrity Present    []Prevention Measures Documented      [] Yes- Altered Skin Integrity Present or Discovered   [] LDA Added if Not in Epic (Describe Wound)   [] New Altered Skin Integrity was Present on Admit and Documented in LDA   [] Wound Image Taken    Wound Care Consulted? No    Attending Nurse:  Magdalena Lam RN/Staff Member: basil

## 2023-12-14 NOTE — PROGRESS NOTES
[facility] - Neurosurgery ([facility])  [facility] Medicine  Progress Note    Patient Name: Bj Pate Jr.  MRN: [id number]  Patient Class: IP- Inpatient   Admission Date: 12/10/2023  Length of Stay: 3 days  Attending Physician: Krystian Reed MD  Primary Care Provider: Jose Antonio Foster MD        Subjective:     Principal Problem:Severe sepsis        HPI:  This is a 73M with a h/o CVA  about 8 to 10 years ago with R sided deficits, DM, HTN who was BIB from home with family with 3 weeks of abdominal pain. Seen at multiple EDS, and generally unremarkable work up as per family. Within last few days started to have progressive worsening back pain. Became acute altered 2 days ago w/, LE weakness now requiring assistance to walk, as well as urinary incontinence. Patient is A/O 0x at admit. Accompanied by Daughter, most of history from her.     Admission to ED patient was febrile at 102 F, tachycardic 130, tachypneic at 23, hypertensive at 140/72 initially satting well on room air later requiring 2 L nasal cannula.  WBCs elevated 12.74 anemia 11.0 platelets normal, sed rate 120+, BUN 27 magnesium 1.5, albumin 2.4, .1, troponin 0.126 urine trace proteinuria trace ketones lactate initially elevated 2.6 later 1.3 CT head unremarkable for acute processes CT abdomen and pelvis and MRI lumbar spine shows left psoas abscess L2-L3 osteomyleitis, and diskitis.  Neurosurgery was consulted recommendations are pending.  Received Cefepime and Vanc    Overview/Hospital Course:  Mr. Pate was admitted for abdominal pain and worsening mental status, and was found to have a collection in his left psoas muscle, diskitis, and osteomyelitis in the L2-L3 region.  He was placed on vancomycin and meropenem for continuing decline in mental status.  Interventional Radiology took a core biopsy of infected disc, but were unable to aspirate anything from the left psoas collection. Neurosurgery is following along with plans for decompressive  "surgery on 12/15/2023.  Due to his poor mental status, an NG tube and tube feedings were ordered to assist with his nutrition status.    Interval History:  Overnight temperature up to 99.4, improved compared to previous 24 hours. More verbal and alert today with groan and yelling when he was repositioned. Tracking well with eyes but still unresponsive and not following commands. Bilirubin down to 1.2 from 1.4, but there was no abdominal tenderness in the right upper quadrant - no facial grimace or involuntary guarding.  He has remained persistently tachycardic but rates are lower in the 110s compared to yesterday. Due to poor oral intake he was started on tube feeds. He received the NG tube overnight, but pulled it out requiring replacement. Soft restraints ordered. IR performed biopsy of disc, but were unable to aspirate any fluid from psoas collection. Patient received "brown bomb" enema this morning at 6am with no stool reported before rounds.     Review of Systems   Unable to perform ROS: Patient nonverbal     Objective:     Vital Signs (Most Recent):  Temp: 98.9 °F (37.2 °C) (12/14/23 1105)  Pulse: 108 (12/14/23 1128)  Resp: 18 (12/14/23 1105)  BP: 135/87 (12/14/23 1105)  SpO2: 95 % (12/14/23 1105) Vital Signs (24h Range):  Temp:  [97.7 °F (36.5 °C)-100.7 °F (38.2 °C)] 98.9 °F (37.2 °C)  Pulse:  [] 108  Resp:  [16-24] 18  SpO2:  [94 %-100 %] 95 %  BP: (122-157)/(74-94) 135/87     Weight: 120.2 kg (264 lb 15.9 oz)  Body mass index is 39.13 kg/m².    Intake/Output Summary (Last 24 hours) at 12/14/2023 1321  Last data filed at 12/14/2023 0600  Gross per 24 hour   Intake 380 ml   Output 300 ml   Net 80 ml         Physical Exam  Vitals and nursing note reviewed. Exam conducted with a chaperone present.   Constitutional:       General: He is not in acute distress.     Appearance: He is obese. He is ill-appearing.   HENT:      Head: Normocephalic and atraumatic.   Eyes:      Extraocular Movements: Extraocular " movements intact.      Pupils: Pupils are equal, round, and reactive to light.   Cardiovascular:      Rate and Rhythm: Normal rate and regular rhythm.      Pulses: Normal pulses.      Heart sounds: Normal heart sounds.   Pulmonary:      Effort: Pulmonary effort is normal.      Breath sounds: Normal breath sounds.   Abdominal:      General: There is no distension.      Palpations: Abdomen is soft.      Tenderness: There is no abdominal tenderness.   Musculoskeletal:      Cervical back: Normal range of motion.      Right lower leg: Edema present.      Left lower leg: Edema present.   Skin:     General: Skin is warm and dry.   Neurological:      Motor: Weakness present.      Comments: Patient nonverbal and not following commands. Tracks movement with eyes.   Withdraws all four extremities to noxious stimuli.             Significant Labs: All pertinent labs within the past 24 hours have been reviewed.  CBC:   Recent Labs   Lab 12/13/23 0313 12/13/23  1212 12/14/23  0622   WBC 14.93*  --  17.74*   HGB 9.4*  --  10.2*   HCT 28.3* 29* 31.2*     --  341     CMP:   Recent Labs   Lab 12/12/23 2206 12/13/23 0313 12/14/23  0622    137 139   K 4.0 3.9 4.4    103 106   CO2 27 25 24   GLU 96 97 125*   BUN 24* 24* 34*   CREATININE 1.1 1.2 1.2   CALCIUM 9.6 9.2 9.8   PROT 6.8 6.2 6.8   ALBUMIN 1.6* 1.5* 1.5*   BILITOT 1.5* 1.4* 1.2*   ALKPHOS 79 70 81   AST 41* 32 61*   ALT 32 26 31   ANIONGAP 7* 9 9     Magnesium:   Recent Labs   Lab 12/13/23 0313 12/14/23  0622   MG 2.3 2.3     POCT Glucose:   Recent Labs   Lab 12/13/23  2127 12/14/23  0745 12/14/23  1302   POCTGLUCOSE 117* 165* 164*       Significant Imaging: I have reviewed all pertinent imaging results/findings within the past 24 hours.    Assessment/Plan:      * Severe sepsis  This patient does have evidence of infective focus  My overall impression is sepsis.  Source:  Psoas Abscess, Osteomyelitis, Discitis, Possible Epidural abcess  Antibiotics given-    Antibiotics (72h ago, onward)      Start     Stop Route Frequency Ordered    12/14/23 0900  vancomycin 1.75 g in 5 % dextrose 500 mL IVPB         -- IV Every 24 hours (non-standard times) 12/13/23 2212    12/12/23 1700  meropenem (MERREM) 2 g in sodium chloride 0.9% 100 mL IVPB         -- IV Every 8 hours (non-standard times) 12/12/23 1537    12/11/23 0752  vancomycin - pharmacy to dose  (vancomycin IVPB (PEDS and ADULTS))        See Hyperspace for full Linked Orders Report.    -- IV pharmacy to manage frequency 12/11/23 0654          Latest lactate reviewed-  Recent Labs   Lab 12/12/23  1653 12/12/23  2206 12/13/23  0313   LACTATE 1.8 1.6 1.3       Organ dysfunction indicate    Will Not start Pressors as bp is greater than 65.   Source control achieved by: abx     CTAP/MRI Lumbar spine showing L psoas collection (2.2 cm), and L2-L3 diskitis/osteomyelitis    - On Vancomycin, Meropenem  - Following biopsy of disc by IR  - Continues to have fever and tachycardia despite tylenol administration  - NSGY consulted planning on L2 to pelvis decompression on 12/15.   - ID consulted and following      Stercoral colitis  CT AP w rectal colitis vs constipation    - brown bomb ordered with minimal output  - Bowel regimen through NG tube      Back pain  Likely secondary to L2-L3 diskitis/ostemyelitis.    MRI spine completed  NSGY consulted  Pain meds PRN      Psoas muscle abscess  See severe sepsis      Malnutrition  Maintenance IVFs  NG tube placement ordered  RDN consulted, appreciate recs  Tube feeds to start after IR procedure, NPO at midnight for surgical decompression      Encephalopathy acute  Likely secondary to sepsis. Patient with history of prior CVA.     Continue antiobiotics and attempting to achieve source control with IR/NSGY   Cefepime discontinued   Neurology and vascular neurology consulted  MRI brain reviewed by vascular neurology  Trend CMP      Acute on chronic systolic heart failure  Results for orders  placed during the hospital encounter of 12/10/23    Echo    Interpretation Summary    Technically difficult portable study    Left Ventricle: The left ventricle is normal in size. There is concentric remodeling. Mild global hypokinesis present. There is mildly reduced systolic function with a visually estimated ejection fraction of 45 - 50%.    Right Ventricle: Normal right ventricular cavity size. Wall thickness is normal. Right ventricle wall motion  is normal. Systolic function is normal.    Pulmonary Artery: The estimated pulmonary artery systolic pressure is at least 24 mmHg.    The IVC was not well visualized.      Osteomyelitis  See severe sepsis        Discitis  See severe sepsis      AMS (altered mental status)  likely 2/2 sepsis  Worsening, no new insults on MRI brain per Stroke    Neurology consulted, low suspicion for seizures, possibly gout flare per their assessment but this seems unlikely given his presentation and MRI/CT findings.     Acute osteomyelitis of lumbar spine  See severe sepsis    Urinary retention  Rivera placed by ED, PVR now that he has external condom catheter. Due to diskitis/osteomyelitis, concern for possible spinal cord compression.    Bladder scan as needed  Document PVRs        History of CVA (cerebrovascular accident)  No acute processes on head CT or brain MRI  Repeat MRI reviewed by Vascular Neuro, likely artifact.     Morbid obesity with BMI of 45.0-49.9, adult  Body mass index is 39.13 kg/m². Morbid obesity complicates all aspects of disease management from diagnostic modalities to treatment. Weight loss encouraged and health benefits explained to patient.           VTE Risk Mitigation (From admission, onward)           Ordered     IP VTE HIGH RISK PATIENT  Once         12/11/23 0652     Place sequential compression device  Until discontinued         12/11/23 0652                    Discharge Planning   GLORIA: 12/18/2023     Code Status: Full Code   Is the patient medically  ready for discharge?: No    Reason for patient still in hospital (select all that apply): Patient unstable and Treatment  Discharge Plan A: Skilled Nursing Facility                  Dick Meza MD  Department of Hospital Medicine   [facility] - Neurosurgery ([facility])

## 2023-12-14 NOTE — ASSESSMENT & PLAN NOTE
likely 2/2 sepsis  Worsening, no new insults on MRI brain per Stroke    Neurology consulted, low suspicion for seizures, possibly gout flare per their assessment but this seems unlikely given his presentation and MRI/CT findings.

## 2023-12-14 NOTE — NURSING
1034: informed via secure chat med team 3 of patients status, vital signs and that I have tried to insert an NG tube multiple times unsuccessfully. Tube kept going to lungs reflected by drop of O2 sat to 86%. Called and spoke to I.R. nurse to see if they could try to insert when patient is down there. Nurse stated [facility] but would attempt. Dr Lau informed

## 2023-12-14 NOTE — PROGRESS NOTES
Progress Note  Neurosurgery    12/14/2023  8:20 AM    Admit Date: 12/10/2023  Pt is hospital day  LOS: 3 days     Hospital Day: 3  Post-operative Day:    POD: * No surgery date entered *  Hospital Day: 5    Active problems:  Patient Active Problem List   Diagnosis    Obstructive sleep apnea on CPAP: setting = 8    Morbid obesity with BMI of 45.0-49.9, adult    Chronic idiopathic gout of multiple sites    Chronic kidney disease, stage 3a    CKD (chronic kidney disease)    History of CVA (cerebrovascular accident)    History of cerebral parenchymal hemorrhage    Essential hypertension    Decreased spinal mobility    Decreased functional mobility and endurance    Cervical radiculopathy    Lumbar spondylosis    DDD (degenerative disc disease), lumbar    DDD (degenerative disc disease), cervical    Osteoarthritis of spine with radiculopathy, cervical region    Carpal tunnel syndrome on both sides    Ulnar neuropathy of both upper extremities    Left lateral epicondylitis    Left carpal tunnel syndrome    Generalized weakness    Acute kidney injury superimposed on chronic kidney disease    Transaminitis    Urinary retention    Chronic gout of left wrist    Polyarticular gout    Acute idiopathic gout of multiple sites    Urinary tract infection with hematuria    Hyperkalemia    Hyponatremia    Leukocytosis    Pseudomonas infection    Decreased strength, endurance, and mobility    Gait instability    Balance problem    Alteration in instrumental activities of daily living (IADL)    Decreased  strength    SOBOE (shortness of breath on exertion)    Anemia    Pulmonary hypertension    Mixed hyperlipidemia    Abnormal nuclear stress test    Reactive airway disease without complication    Hyperlipidemia, acquired    Aortic root dilatation    Colitis    Left hip pain    Torticollis    Acute osteomyelitis of lumbar spine    Severe sepsis    AMS (altered mental status)    Discitis    Osteomyelitis    Acute on chronic systolic  heart failure    Encephalopathy acute    Malnutrition    Psoas muscle abscess    Back pain    Stercoral colitis    Proctitis    Hemiparesis     Active Hospital Problems    Diagnosis  POA    *Severe sepsis [A41.9, R65.20]  Yes    Proctitis [K62.89]  Yes    Hemiparesis [G81.90]  Yes    Discitis [M46.40]  Yes    Osteomyelitis [M86.9]  Yes    Acute on chronic systolic heart failure [I50.23]  Yes    Encephalopathy acute [G93.40]  Yes    Malnutrition [E46]  Yes     Noted that pt has had a -19% wt change since admit.       Psoas muscle abscess [K68.12]  Yes    Back pain [M54.9]  Yes    Stercoral colitis [K52.89]  Unknown    Acute osteomyelitis of lumbar spine [M46.26]  Yes    AMS (altered mental status) [R41.82]  Yes    Urinary retention [R33.9]  Yes    History of CVA (cerebrovascular accident) [Z86.73]  Not Applicable    Morbid obesity with BMI of 45.0-49.9, adult [E66.01, Z68.42]  Not Applicable     Patient is aware of need for weight loss  and has been asked to make an effort to improve diet        Resolved Hospital Problems   No resolved problems to display.         SUBJECTIVE:   HPI:   Mr. Pate is a 73M w/ hx CVA, CKD3, HTN, DM2, COPD who presents with increasing lethargy and confusion. Per ED team, patient had 3 weeks of low back pain, with a fall yesterday leading to his presentation. He had subjective lower extremity weakness and urinary retention after the fall. Septic on presentation to ED. Too altered to meaningfully participate in spine motor exam at time of NSGY evaluation.      Follow-up For:  Procedure(s) (LRB):  **LOOP X**FUSION, SPINE, LUMBAR, TLIF, POSTERIOR APPROACH, USING PEDICLE SCREW (N/A)      Interval HPI:   More attentive later this afternoon. Exam remains stable. HR 120s. Temp to 102. WBC up to 16K        OBJECTIVE:     Vitals:  Vital Signs Range (Last 24H):  Temp:  [97.7 °F (36.5 °C)-99.4 °F (37.4 °C)]   Pulse:  []   Resp:  [16-22]   BP: (119-157)/(68-94)   SpO2:  [94 %-100 %]     I & O  (Last 24H):    Intake/Output Summary (Last 24 hours) at 12/14/2023 1723  Last data filed at 12/14/2023 0600  Gross per 24 hour   Intake 380 ml   Output 300 ml   Net 80 ml           Physical Exam:  General: appears well-developed and well-nourished, no distress  HEENT: MICHAEL PIMENTEL FD   Cardiovascular: elevated HR  Pulm: NWOB  Abdominal: soft  Neuro:   Altered. Not following commands but move spontaneously       Move/withdrew BUE and BLE (minimally to pain L>R)   Baseline R side weakness and stiffness from remote stroke   Lumbar midline tenderness     Labs:  Recent Results (from the past 24 hour(s))   POCT glucose    Collection Time: 12/13/23  5:45 PM   Result Value Ref Range    POCT Glucose 95 70 - 110 mg/dL   VANCOMYCIN, TROUGH    Collection Time: 12/13/23  9:10 PM   Result Value Ref Range    Vancomycin-Trough 22.0 10.0 - 22.0 ug/mL   POCT glucose    Collection Time: 12/13/23  9:27 PM   Result Value Ref Range    POCT Glucose 117 (H) 70 - 110 mg/dL   Comprehensive Metabolic Panel (CMP)    Collection Time: 12/14/23  6:22 AM   Result Value Ref Range    Sodium 139 136 - 145 mmol/L    Potassium 4.4 3.5 - 5.1 mmol/L    Chloride 106 95 - 110 mmol/L    CO2 24 23 - 29 mmol/L    Glucose 125 (H) 70 - 110 mg/dL    BUN 34 (H) 8 - 23 mg/dL    Creatinine 1.2 0.5 - 1.4 mg/dL    Calcium 9.8 8.7 - 10.5 mg/dL    Total Protein 6.8 6.0 - 8.4 g/dL    Albumin 1.5 (L) 3.5 - 5.2 g/dL    Total Bilirubin 1.2 (H) 0.1 - 1.0 mg/dL    Alkaline Phosphatase 81 55 - 135 U/L    AST 61 (H) 10 - 40 U/L    ALT 31 10 - 44 U/L    eGFR >60.0 >60 mL/min/1.73 m^2    Anion Gap 9 8 - 16 mmol/L   Magnesium    Collection Time: 12/14/23  6:22 AM   Result Value Ref Range    Magnesium 2.3 1.6 - 2.6 mg/dL   Phosphorus    Collection Time: 12/14/23  6:22 AM   Result Value Ref Range    Phosphorus 3.7 2.7 - 4.5 mg/dL   CBC with Automated Differential    Collection Time: 12/14/23  6:22 AM   Result Value Ref Range    WBC 17.74 (H) 3.90 - 12.70 K/uL    RBC 3.31 (L) 4.60 -  6.20 M/uL    Hemoglobin 10.2 (L) 14.0 - 18.0 g/dL    Hematocrit 31.2 (L) 40.0 - 54.0 %    MCV 94 82 - 98 fL    MCH 30.8 27.0 - 31.0 pg    MCHC 32.7 32.0 - 36.0 g/dL    RDW 14.2 11.5 - 14.5 %    Platelets 341 150 - 450 K/uL    MPV 10.7 9.2 - 12.9 fL    Immature Granulocytes 1.0 (H) 0.0 - 0.5 %    Gran # (ANC) 15.6 (H) 1.8 - 7.7 K/uL    Immature Grans (Abs) 0.17 (H) 0.00 - 0.04 K/uL    Lymph # 0.6 (L) 1.0 - 4.8 K/uL    Mono # 1.4 (H) 0.3 - 1.0 K/uL    Eos # 0.0 0.0 - 0.5 K/uL    Baso # 0.01 0.00 - 0.20 K/uL    nRBC 0 0 /100 WBC    Gran % 87.7 (H) 38.0 - 73.0 %    Lymph % 3.2 (L) 18.0 - 48.0 %    Mono % 8.0 4.0 - 15.0 %    Eosinophil % 0.0 0.0 - 8.0 %    Basophil % 0.1 0.0 - 1.9 %    Differential Method Automated    POCT glucose    Collection Time: 12/14/23  7:45 AM   Result Value Ref Range    POCT Glucose 165 (H) 70 - 110 mg/dL   POCT glucose    Collection Time: 12/14/23  1:02 PM   Result Value Ref Range    POCT Glucose 164 (H) 70 - 110 mg/dL   POCT glucose    Collection Time: 12/14/23  3:31 PM   Result Value Ref Range    POCT Glucose 154 (H) 70 - 110 mg/dL   POCT glucose    Collection Time: 12/14/23  5:19 PM   Result Value Ref Range    POCT Glucose 128 (H) 70 - 110 mg/dL         Recent Labs   Lab 12/12/23  0533 12/13/23  0313 12/13/23  1212 12/14/23  0622   WBC 16.18* 14.93*  --  17.74*   HGB 11.0* 9.4*  --  10.2*   HCT 34.3* 28.3* 29* 31.2*    284  --  341         Recent Labs   Lab 12/12/23  2206 12/13/23  0313 12/14/23  0622    137 139   K 4.0 3.9 4.4    103 106   CO2 27 25 24   BUN 24* 24* 34*   CREATININE 1.1 1.2 1.2   CALCIUM 9.6 9.2 9.8         Microbiology Results (last 7 days)       Procedure Component Value Units Date/Time    AFB Culture & Smear [[id number]] Collected: 12/13/23 1703    Order Status: Completed Specimen: Abscess from Buttocks, Left Updated: 12/14/23 1318     AFB CULTURE STAIN No acid fast bacilli seen.    Aerobic culture [[id number]] Collected: 12/13/23 1703    Order Status:  Completed Specimen: Abscess from Buttocks, Left Updated: 12/14/23 0727     Aerobic Bacterial Culture No growth    Blood culture [[id number]] Collected: 12/12/23 0033    Order Status: Completed Specimen: Blood Updated: 12/14/23 0612     Blood Culture, Routine No Growth to date      No Growth to date      No Growth to date    Blood culture [[id number]] Collected: 12/12/23 0033    Order Status: Completed Specimen: Blood Updated: 12/14/23 0612     Blood Culture, Routine No Growth to date      No Growth to date      No Growth to date    Blood culture x two cultures. Draw prior to antibiotics. [[id number]] Collected: 12/10/23 2007    Order Status: Completed Specimen: Blood from Peripheral, Antecubital, Left Updated: 12/13/23 2212     Blood Culture, Routine No Growth to date      No Growth to date      No Growth to date      No Growth to date    Narrative:      Aerobic and anaerobic    Blood culture x two cultures. Draw prior to antibiotics. [[id number]] Collected: 12/10/23 2007    Order Status: Completed Specimen: Blood from Peripheral, Hand, Left Updated: 12/13/23 2212     Blood Culture, Routine No Growth to date      No Growth to date      No Growth to date      No Growth to date    Narrative:      Aerobic and anaerobic    Gram stain [[id number]] Collected: 12/13/23 1703    Order Status: Completed Specimen: Abscess from Buttocks, Left Updated: 12/13/23 2104     Gram Stain Result No WBC's      No organisms seen    Culture, Anaerobic [[id number]] Collected: 12/13/23 1703    Order Status: Sent Specimen: Abscess from Buttocks, Left Updated: 12/13/23 1929    Fungus culture [[id number]] Collected: 12/13/23 1703    Order Status: Sent Specimen: Abscess from Buttocks, Left Updated: 12/13/23 1928            Imaging:  Imaging Results              MRI Brain W WO Contrast (Final result)  Result time 12/12/23 11:10:53      Final result by Kevin Guerrero MD (12/12/23 11:10:53)                   Impression:      Markedly limited by  motion.  The postcontrast images particulate are inadequate for evaluation.    No hydrocephalus mass effect or acute intracranial hemorrhage.  Punctate focus of increased signal on diffusion-weighted imaging within the dorsal shaila is thought more likely artifactual however a tiny recent infarct is difficult to fully exclude but again thought unlikely.  No other evidence of acute infarct.      Electronically signed by: Kevin Guerrero  Date:    12/12/2023  Time:    11:10               Narrative:    EXAMINATION:  MRI BRAIN W WO CONTRAST    CLINICAL HISTORY:  Mental status change, unknown cause;    TECHNIQUE:  Multiplanar multisequence MR imaging of the brain was performed before and after the administration of 10 mL Gadavist intravenous contrast.    COMPARISON:  Head CT 12/10/2023, MRI 01/30/2016    FINDINGS:  There is marked motion artifact.  There is no evidence of hydrocephalus mass effect or acute intracranial hemorrhage.    On diffusion-weighted imaging a punctate focus of increased signal within the dorsal shaila is thought more likely artifactual however a tiny recent infarct is not excluded.    Chronic hemosiderin staining from prior insult involving the left thalamus and deep white matter similar to the prior study.  Chronic right thalamic and right deep white matter infarct identified.    Postcontrast imaging is markedly limited due to motion and inadequate to evaluate for abnormal enhancement.    Normal arterial flow voids are preserved at the skull base.    The visualized sinuses and mastoid air cells are clear                                       MRI Cervical Spine W WO Cont (Final result)  Result time 12/12/23 11:09:28      Final result by Humberto Arciniega MD (12/12/23 11:09:28)                   Impression:      1. Severely motion compromised, nearly nondiagnostic examination.  2. Provided sequences without convincing evidence of acute infectious or noninfectious inflammatory process in the cervical or  thoracic spine.  3. Additional details, as provided in the body of the report.      Electronically signed by: Humberto Arciniega  Date:    12/12/2023  Time:    11:09               Narrative:    EXAMINATION:  MRI THORACIC SPINE W WO CONTRAST; MRI CERVICAL SPINE W WO CONTRAST    CLINICAL HISTORY:  Osteomyelitis, thoracic;; Osteomyelitis, cervical;    TECHNIQUE:  MRI of the cervical and thoracic spine performed without with contrast.  Post-contrast series performed while intravenous administration 10 mL Gadavist contrast.    COMPARISON:  Correlation made with CT of the chest, abdomen, and pelvis performed 12/10/2023 and MRI of the lumbar spine performed 12/11/2023.    FINDINGS:  Comment: Examination is substantially motion compromised, and nearly nondiagnostic.    CERVICAL SPINE:    Alignment: There is no significant vertebral subluxation.    Developmental spinal canal narrowing: None.    Discs: Multilevel degenerative disc osteophyte complex formation.    Vertebrae: No definite acute abnormality.    Spinal cord: Essentially nondiagnostic examination.  No definite acute abnormality identified.    Posterior fossa: No posterior fossa abnormalities are identified.    Degenerative: Findings nondiagnostic for assessment of degenerative spinal canal and foraminal narrowing.  No definite critical spinal canal foraminal narrowing.  Central protrusion at the C3-4 level contacts the ventral cord.  Least mild central spinal canal stenosis suggested at C4-5, C5-6, and C6-7.    No definite pathologic enhancement.    THORACIC SPINE:    Alignment: There is no significant vertebral subluxation.    Developmental spinal canal narrowing: None.    Discs: Multilevel degenerative disc disease.    Disc bulging at the T5-6, T6-7, and T7-8 appears contribute to at least mild-to-moderate central spinal canal stenosis.    Vertebrae: No definite acute abnormality.    Spinal cord: Nearly nondiagnostic assessment without definite acute  abnormality.    Additional comments: No definite pathologic enhancement.                                       MRI Thoracic Spine W WO Cont (Final result)  Result time 12/12/23 11:09:28      Final result by Humberto Arciniega MD (12/12/23 11:09:28)                   Impression:      1. Severely motion compromised, nearly nondiagnostic examination.  2. Provided sequences without convincing evidence of acute infectious or noninfectious inflammatory process in the cervical or thoracic spine.  3. Additional details, as provided in the body of the report.      Electronically signed by: Humberto Arciniega  Date:    12/12/2023  Time:    11:09               Narrative:    EXAMINATION:  MRI THORACIC SPINE W WO CONTRAST; MRI CERVICAL SPINE W WO CONTRAST    CLINICAL HISTORY:  Osteomyelitis, thoracic;; Osteomyelitis, cervical;    TECHNIQUE:  MRI of the cervical and thoracic spine performed without with contrast.  Post-contrast series performed while intravenous administration 10 mL Gadavist contrast.    COMPARISON:  Correlation made with CT of the chest, abdomen, and pelvis performed 12/10/2023 and MRI of the lumbar spine performed 12/11/2023.    FINDINGS:  Comment: Examination is substantially motion compromised, and nearly nondiagnostic.    CERVICAL SPINE:    Alignment: There is no significant vertebral subluxation.    Developmental spinal canal narrowing: None.    Discs: Multilevel degenerative disc osteophyte complex formation.    Vertebrae: No definite acute abnormality.    Spinal cord: Essentially nondiagnostic examination.  No definite acute abnormality identified.    Posterior fossa: No posterior fossa abnormalities are identified.    Degenerative: Findings nondiagnostic for assessment of degenerative spinal canal and foraminal narrowing.  No definite critical spinal canal foraminal narrowing.  Central protrusion at the C3-4 level contacts the ventral cord.  Least mild central spinal canal stenosis suggested at C4-5, C5-6,  and C6-7.    No definite pathologic enhancement.    THORACIC SPINE:    Alignment: There is no significant vertebral subluxation.    Developmental spinal canal narrowing: None.    Discs: Multilevel degenerative disc disease.    Disc bulging at the T5-6, T6-7, and T7-8 appears contribute to at least mild-to-moderate central spinal canal stenosis.    Vertebrae: No definite acute abnormality.    Spinal cord: Nearly nondiagnostic assessment without definite acute abnormality.    Additional comments: No definite pathologic enhancement.                                        MRI Lumbar Spine W WO Cont (Final result)  Result time 12/11/23 03:18:20      Final result by Dayne Marquez MD (12/11/23 03:18:20)                   Impression:      Findings suggestive of discitis/osteomyelitis at L2-L3 with left psoas abscess measuring up to 2.2 cm in size.    Probable early discitis/osteomyelitis at L3-L4.    Motion limited study.  Thin epidural abscess is difficult to exclude.    Advanced multilevel degenerative changes throughout the lumbar spine with severe central canal stenosis and significant bilateral neural foraminal narrowing at L4-L5.    Multilevel facet arthropathy and facet edema, which could be exaggerated by aforementioned inflammatory changes in the paraspinal soft tissues.    Additional findings discussed in the body of the report.    This report was flagged in [employer] as abnormal.      Electronically signed by: Dayne Marquez MD  Date:    12/11/2023  Time:    03:18               Narrative:    EXAMINATION:  MRI LUMBAR SPINE W WO CONTRAST    CLINICAL HISTORY:  Low back pain, progressive neurologic deficit;    TECHNIQUE:  Multiplanar, multisequence MR imaging of the lumbar spine was obtained with and without 10 mL Gadavist IV contrast.    COMPARISON:  CT, 12/10/2023.    FINDINGS:  Exam quality is significantly limited by motion.    Reversal of the normal lumbar lordosis.  Minimal retrolisthesis of L2 with respect  to L3.  Minimal anterolisthesis of L4 with respect to L5.  Otherwise, grossly normal sagittal alignment.    Extensive inflammatory changes and edema in the paraspinal muscles and psoas muscles throughout the majority of the lumbar spine.  Multifocal bilateral facet edema and facet degenerative changes.    Widening of the disc space at L2-L3 with increased STIR signal and enhancement.  Associated edema signal and enhancement involving the inferior endplate of L2 and superior endplate of L3.  There is also subtle increased edema and enhancement involving the L3-L4 disc space.  Findings are suggestive of discitis osteomyelitis.    There is an ill-defined and poorly evaluated collection in the left psoas muscle at the level of L2-L3 measuring roughly 2.2 x 1.8 x 1.8 cm (series 13, image 18 and series 10, image 20).  This finding is most consistent with an associated abscess or phlegmon.    Evaluation of the central canal and cauda quinine a significantly limited by motion and image noise.  There is mild enhancement of the thecal sac raising the question of small epidural abscess, though this finding is not definitive as evaluation is significantly limited by motion.    L1-2: Severe disc space height loss.  Mild-to-moderate bilateral neural foraminal narrowing.  No severe central canal stenosis.    L2-3: Widening of the intervertebral disc space with increased disc space signal and enhancement consistent with discitis/osteomyelitis.  At least moderate bilateral neural foraminal narrowing and mild central canal stenosis.    L3-4: Significant disc space height loss.  Abnormal signal and enhancement involving the L3-L4 disc space.  Broad-based posterior disc bulge extending into the central canal and bilateral lateral recesses.  At least moderate bilateral neural foraminal narrowing and moderate central canal stenosis.    L4-5: Mild disc space height loss and disc desiccation.  Broad-based posterior disc bulge and  ligamentum flavum hypertrophy contributes to severe central canal stenosis and moderate bilateral neural foraminal narrowing.    L5-S1:  Mild disc space height loss and disc desiccation.  Small posterior disc bulge.  Mild right greater than left neural foraminal narrowing.  No significant central canal stenosis.                                        CT Chest Abdomen Pelvis With IV Contrast (XPD) NO Oral Contrast (Final result)  Result time 12/10/23 22:09:41      Final result by Dayne Marquez MD (12/10/23 22:09:41)                   Impression:      Motion and artifact limited study.    New subtle inflammatory changes in the paraspinal soft tissues and retroperitoneum/psoas muscles at the level L2-L3 with slightly worse widening of the L2-L3 disc space and possible early endplate erosive changes at this level.  Underlying discitis/osteomyelitis at this level is a consideration.  Suggest correlation with symptoms, risk factors, and inflammatory markers.  Lumbar spine MRI with and without contrast may provide improved sensitivity and further evaluation of the central canal if clinically warranted.    Mildly worse bibasilar subsegmental atelectasis and possible trace bilateral pleural fluid.  Superimposed infectious/inflammatory process difficult to exclude in the lung bases secondary to extensive artifact in this region.    Probable hepatic steatosis.    Mild stool burden in the colon with moderate distension of the rectum with solid stool and trace presacral edema.  Suggest correlation for constipation or stercoral colitis.    Advanced degenerative changes in the spine with multilevel central canal stenosis and neural foraminal narrowing most pronounced in the lower lumbar spine.    Prostatomegaly.    Stable benign fat containing right adrenal nodule.    Other incidental findings discussed in the body of the report.    This report was flagged in [employer] as abnormal.      Electronically signed by: Dayne Marquez  MD  Date:    12/10/2023  Time:    22:09               Narrative:    EXAMINATION:  CT CHEST ABDOMEN PELVIS WITH IV CONTRAST (XPD)    CLINICAL HISTORY:  Sepsis;    TECHNIQUE:  Low dose axial images, sagittal and coronal reformations were obtained from the thoracic inlet to the pubic symphysis following the IV administration of 100 mL of Omnipaque 350 .  Oral contrast was not given.    COMPARISON:  CT abdomen pelvis, 08/26/2023.    FINDINGS:  Evaluation is limited by extensive streak artifact due to the patient's arms overlying the field of view.  Exam quality also limited by motion and poor bolus timing.    Chest:    Base of the neck is negative for acute finding.  There is a 1.2 cm hypodensity in the right lobe of the thyroid gland, too small for dedicated follow-up.    Thoracic aorta is normal in caliber with mild calcific atherosclerosis.  No dissection.    Heart size is normal.  Mild coronary artery calcifications.  No pericardial effusion.    Evaluation of the pulmonary parenchyma limited by motion.  Trachea and proximal airways are patent.  There is prominent bibasilar subsegmental atelectasis, right side worse than left, mildly worse when compared with the prior study.  Trace pleural effusions cannot be excluded, noting that evaluation of this region is limited by motion.  Superimposed small areas of consolidation in the right lung base also difficult to exclude secondary to motion and streak artifact in this region.    Otherwise, no sizable pleural effusion.  No pneumothorax.  No bulky mediastinal lymphadenopathy.    Abdomen:    Liver is stable and negative for acute finding.  There is probable hepatic steatosis.  Detailed evaluation of the liver parenchyma significantly limited by motion and streak artifact.  Gallbladder is unremarkable allowing for motion.  No intrahepatic biliary ductal dilatation.    Spleen, adrenals, and pancreas are stable.  There is a fat density lesion in the right adrenal gland,  likely a benign myelolipoma, stable.  Detailed evaluation of the pancreatic parenchyma limited by motion and streak artifact.    Kidneys are stable.  No hydronephrosis.  Evaluation for renal stones limited by poor bolus timing and presence of contrast in the collecting system.  Probable small cyst in the lower pole of the right kidney.    No small bowel obstruction.  Normal appendix.  Mild stool burden in the colon.  Rectum is distended up to 7 cm in transverse width containing solid stool.  Minimal presacral edema.    No pneumoperitoneum or organized fluid collection.    No bulky retroperitoneal lymphadenopathy.  There is subtle retroperitoneal fat stranding with asymmetric enlargement of the left psoas muscle and heterogeneous attenuation in the bilateral psoas muscles, left side worse than right.    Abdominal aorta is normal in caliber with mild calcific atherosclerosis.    Portal vein is patent.  No portal venous gas.    Pelvis:    Urinary bladder is mildly distended and otherwise unremarkable.  Rectum is distended with solid stool.  There is mild presacral edema.  Prostate is enlarged and similar to the prior study.  No significant pelvic free fluid.    Bones and soft tissues:    No aggressive osseous lesions.  Moderate degenerative changes in the thoracic spine.  Detailed evaluation of the lumbar spine significantly limited by motion and extensive streak artifact.  There is slightly worse widening of the L2-L3 disc space with resolution of previously seen vacuum phenomena at this location.  Sclerotic endplate changes at L2-L3 with minimal endplate lucencies.  Significant central canal stenosis and neural foraminal narrowing at multiple levels in the lumbar spine.  Detailed evaluation of the central canal limited by aforementioned artifact.  Central canal stenosis appears most pronounced at L4-L5.  There are paravertebral and retroperitoneal inflammatory changes centered at the level of L2-L3.  Retroperitoneal  microabscess or phlegmon not excluded.  No large drainable fluid collection identified, allowing for limitations.                                       CT Head Without Contrast (Final result)  Result time 12/10/23 21:42:25      Final result by Marcial De La Rosa MD (12/10/23 21:42:25)                   Impression:      Chronic change noted, there is no evidence for superimposed acute intracranial process.      Electronically signed by: Marcial De La Rosa  Date:    12/10/2023  Time:    21:42               Narrative:    EXAMINATION:  CT HEAD WITHOUT CONTRAST    CLINICAL HISTORY:  Mental status change, unknown cause;    TECHNIQUE:  Low dose axial images were obtained through the head.  Coronal and sagittal reformations were also performed. Contrast was not administered.    COMPARISON:  CT examination of the brain without contrast February 27, 2020    FINDINGS:  Artifact is present diminishing sensitivity of the exam.  When accounting for artifact the intracranial appearance is consistent with chronic change.  Involutional change and chronic appearing white matter change noted, focal areas of low density along the periventricular white matter, basal ganglia, and right thalamus consistent with areas of remote lacunar-type ischemic change are noted.    There is no evidence for intracranial mass, mass effect or midline shift and there is no evidence for acute intracranial hemorrhage.  Near empty sella configuration is noted.  Otherwise appropriate CSF spaces are seen at the skull base.    The mastoid air cells appear well aerated, mild paranasal sinus mucosal thickening is noted.  The osseous structures appear intact.  The orbits appear intact.                                       X-Ray Chest AP Portable (Final result)  Result time 12/10/23 20:40:16      Final result by Caren Noe MD (12/10/23 20:40:16)                   Impression:      No acute intrathoracic abnormality detected.  Slightly low lung  volumes.      Electronically signed by: Caren Noe  Date:    12/10/2023  Time:    20:40               Narrative:    EXAMINATION:  AP PORTABLE CHEST    CLINICAL HISTORY:  Sepsis;    TECHNIQUE:  AP portable chest radiograph was submitted.    COMPARISON:  07/06/2021    FINDINGS:  AP portable chest radiograph demonstrates a cardiac silhouette within normal limits.  There is tortuosity and ectasia of the thoracic aorta.  There is no focal consolidation, pneumothorax, or pleural effusion.  The lung volumes are slightly low.  There is mild asymmetric elevation right hemidiaphragm.  The bones are diffusely osteopenic.                                      Medication:  Scheduled Meds:   atorvastatin  40 mg Oral Daily    fluticasone furoate-vilanteroL  1 puff Inhalation Daily    gabapentin  600 mg Per NG tube TID    meropenem (MERREM) IVPB  2 g Intravenous Q8H    polyethylene glycol  17 g Per NG tube BID    senna-docusate 8.6-50 mg  1 tablet Per NG tube Daily    vancomycin (VANCOCIN) IV (PEDS and ADULTS)  1,750 mg Intravenous Q24H       Infusions:      PRN Meds:  acetaminophen, aluminum-magnesium hydroxide-simethicone, dextrose 10%, dextrose 10%, diphenhydrAMINE, fentaNYL, glucagon (human recombinant), glucose, glucose, HYDROmorphone, insulin aspart U-100, levalbuterol, melatonin, morphine, naloxone, ondansetron, sodium chloride 0.9%, sodium chloride 0.9%, Pharmacy to dose Vancomycin consult **AND** vancomycin - pharmacy to dose      Lines/Drains:       Peripheral IV - Single Lumen 12/11/23 0006 22 G Anterior;Right Shoulder (Active)   Site Assessment Clean;Dry;Intact 12/11/23 2000   Extremity Assessment Distal to IV No redness;No swelling;No warmth;No abnormal discoloration 12/11/23 2000   Line Status Infusing 12/11/23 2000   Dressing Status Dry;Clean;Intact 12/11/23 2000   Dressing Intervention Integrity maintained 12/11/23 2000   Dressing Change Due 12/14/23 12/11/23 1730   Site Change Due 12/14/23 12/11/23 1730  "  Reason Not Rotated Not due 12/11/23 2000   Number of days: 1            Peripheral IV - Single Lumen 12/11/23 0821 20 G;1 in Posterior;Right Hand (Active)   Site Assessment Clean;Dry;Intact 12/11/23 2000   Extremity Assessment Distal to IV No abnormal discoloration;No redness;No swelling;No warmth 12/11/23 2000   Line Status Infusing 12/11/23 2000   Dressing Status Clean;Intact;Dry 12/11/23 2000   Dressing Intervention Integrity maintained 12/11/23 2000   Dressing Change Due 12/14/23 12/11/23 1730   Site Change Due 12/14/23 12/11/23 1730   Reason Not Rotated Not due 12/11/23 2000   Number of days: 0            Midline Catheter Insertion/Assessment  - Single Lumen 12/12/23 0215 Left basilic vein (medial side of arm) 20g x 10cm (Active)   $ Midline Charges (Upon insertion) Bedside Insertion Performed Pt > 3 Years Old;Midline Catheter (Supply);Ultrasound Guide for Vascular Access 12/12/23 0230   Site Assessment Clean;Dry;Intact;No redness;No swelling 12/12/23 0230   IV Device Securement catheter securement device 12/12/23 0230   Line Status Blood return noted;Flushed;Saline locked 12/12/23 0230   Dressing Type CHG impregnated dressing/sponge;Central line dressing 12/12/23 0230   Dressing Status Clean;Intact;Dry 12/12/23 0230   Dressing Intervention First dressing 12/12/23 0230   Dressing Change Due 12/19/23 12/12/23 0230   Site Change Due 01/10/24 12/12/23 0230   Reason Not Rotated Not due 12/12/23 0230   Number of days: 0            Urethral Catheter 12/11/23 0100 Silicone 16 Fr. (Active)   Site Assessment Clean;Intact 12/12/23 0037   Collection Container Urimeter 12/12/23 0037   Securement Method secured to top of thigh w/ adhesive device 12/11/23 1827   Catheter Care Performed yes 12/11/23 1827   Reason for Continuing Urinary Catheterization Urinary retention 12/11/23 1827   CAUTI Prevention Bundle Securement Device in place with 1" slack;Intact seal between catheter & drainage tubing;Drainage bag/urimeter off the " floor;Sheeting clip in use;No dependent loops or kinks;Drainage bag/urimeter not overfilled (<2/3 full);Drainage bag/urimeter below bladder 12/11/23 1827   Output (mL) 200 mL 12/12/23 0037   Number of days: 1       ASSESSMENT/PLAN:   A 73M w/ hx CVA, CKD3, HTN, DM2, and COPD who presents with AMS likely 2/2 underling sepsis as well as progressive back pain and inability to ambulate for the past 2 weeks due to progressive L2-L3 osteodiscitis and presumed mechanical instability.      CT shows lumbar spondylosis and progressive errosive changes at L2-L3   MRI with lumbar spondylosis 2/2 multilevel of DDD and L2-L3 osteodiscitis wrose at L2-L4 with moderate to severe stenosis as well as L psoas abscess.     Infection markers elevated: , CRP >120. Bcx negative thus far 12/10 and 12/12. On broad spectrum abx     Now s/p IR bx yday and attempted to aspirated psoas abscess 12/13      Planning for for L1-L4 decompression and fusion tomrw   Follow up disc bx  MRI w/wo C and T spine obtained but suboptimal without evidence of infection other where else   Continue AMS and sepsis work up per primary team  TTE completed, EF 45-50 %  ID consultation and continue broad spec abx   Please trend infection markers   DVT US BLE: negative   Cleared by HM  Keep NPO after MN and hold tube feed  Hold SQH  [facility] will follow  Further care per primary team     Dispo: OR tomrw    D/w Dr.Kalyvas Suzan Traylor MD  [facility]

## 2023-12-14 NOTE — PROGRESS NOTES
[facility])  Infectious Disease  Progress Note    Patient Name: Bj Pate Jr.  MRN: [id number]  Admission Date: 12/10/2023  Length of Stay: 3 days  Attending Physician: Krystian Reed MD  Primary Care Provider: Jose Antonio Foster MD    Isolation Status: No active isolations  Assessment/Plan:      Neuro  AMS (altered mental status)  See AP above    ID  Discitis  See AP above    Acute osteomyelitis of lumbar spine  74 yo male with PMH of CVA, CKD3, HTN, DM2, COPD who presents with increasing lethargy and confusion, who sustained a fall, and then reported urinary retention, lower extremity weakness.     MRI lumbar spine noting L2-3 discitis and possible L3-4 osteo, as well as left psoas abscess (2cm). Unable to exclude epidural abscess. MRI of brain, cervical spine, thoracic spine limited study due to motion, vascular neurology though noting low suspicion for infarct based on findings. Blood cultures from admit are negative thus far.     S/p IR aspiration attempt, unable to get fluid, sp L2-L3 disc space biopsy, pending. Pending NSGY plans. Tmax 102.3 yesterday, afebrile thus far today. Continues with leukocytosis. On meropenem/vanc. Daughter at bedside and feels pt seems slightly more alert and is responding to her voice, although still remains altered. Pt with small skin tear noted to right flank. Without s/s of infection.       Recommendations:  - continue vanc. Inpatient pharm to dose. Trough goal 15-20  - continue meropenem 2g IV q8hr.   - will follow culture data and tailor antibiotics as needed   - plan reviewed with ID staff. ID will follow.     GI  Psoas muscle abscess  See AP above      Thank you for your consult. I will follow-up with patient. Please contact us if you have any additional questions.    Christiano Ashley, TORI  Infectious Disease  [facility]    Subjective:     Principal Problem:Severe sepsis    HPI: Mr. Pate is a 74 yo male with PMH of CVA, CKD3,  HTN, DM2, COPD who presents with increasing lethargy and confusion.     Pt remains altered during assessment, history gathered per family at bedside and chart review. Per chart review, pt reported ~3 weeks of low back pain, as well as a fall leading up to presentation. Following the fall, pt reported lower extremity weakness and urinary retention. Daughter at bedside states that pt has not had any surgical procedures or injections recently, but was treated with pain management in 2019 and had lumbar and cervical injections. Pt is retired, but was a  previously. Family denied him having pets, denied adventurous hobbies, denied raw food intake. Daughter reported recent ER admission for abdominal pain. States pt required medication for a parasite that improved his symptoms. Upon chart review, appears pt presented in August with abdominal pain, CT AP was negative for acute findings. Pt was discharged and followed up with PCP shortly after and stated his symptoms had improved.     On admission, pt was febrile to 102, and tachycardic, with increasing leukocytosis, 13->16k, stable anemia, elevated sed/CRP: >120, 375, procal 1.37. blood cultures negative on admission. MRI lumbar spine noting discitis/osteomyelitis at L2-3 with left psoas abscess (2.2cm), as well as early osteo L3-4, noting difficult study and epidural abscess was difficult to exclude. CT head without evidence of superimposed acute intracranial process. MRI brain, cervical spine, and thoracic spine pending.     NSGY following and recommended IR consult for potential psoas abscess drainage and culture. Planning L2-pelvis decompression and fusion on 12/15/23.  IR planning CT guided aspiration/drainage of psoas abscess on either 12/12 or 12/13.     Pt is currently on vanc and cefepime. ID was consulted for antibiotic recs.       Interval hx:     S/p IR aspiration attempt, unable to get fluid, sp L2-L3 disc space biopsy, pending. Pending NSGY plans.  Tmax 102.3 yesterday, afebrile thus far today. Continues with leukocytosis. On meropenem/vanc. Daughter at bedside and feels pt seems slightly more alert and is responding to her voice, although still remains altered.     Past Medical History:   Diagnosis Date    Acute on chronic systolic heart failure 12/13/2023    Anemia 06/04/2021    Anticoagulant long-term use     Basal ganglia hemorrhage     Left basal ganglia hemorrhage with resultant right-sided hemiparesis which has resolved.     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        Past Surgical History:   Procedure Laterality Date    CARPAL TUNNEL RELEASE Left 2/19/2020    Procedure: RELEASE, CARPAL TUNNEL LEFT;  Surgeon: Maria Luisa Mccurdy MD;  Location: [facility];  Service: Orthopedics;  Laterality: Left;    COLONOSCOPY N/A 7/28/2023    Procedure: COLONOSCOPY;  Surgeon: Jaylene Alvarado MD;  Location: [facility] ENDO;  Service: Endoscopy;  Laterality: N/A;    EPIDURAL STEROID INJECTION N/A 5/2/2019    Procedure: Injection, Steroid, Epidural Cervical;  Surgeon: Dariel Doan Jr., MD;  Location: [facility] ENDO;  Service: Pain Management;  Laterality: N/A;  Cervical Epidural Steroid Injection     [id number]    Arrive @ 1130; ASA; Check BG    EPIDURAL STEROID INJECTION Bilateral 5/29/2019    Procedure: Lumbar Medial Branch Blocks;  Surgeon: Dariel Doan Jr., MD;  Location: [facility] ENDO;  Service: Pain Management;  Laterality: Bilateral;   Bilateral Lumbar Medial Branch Blocks L3, L4, L5    [id number]  [id number]    Attive @ 1030 (11 arrival request); NO Sedation; ASA; Check BG    EPIDURAL STEROID INJECTION Bilateral 7/3/2019    Procedure: Lumbar Medial Branch Blocks;  Surgeon: Dariel Doan Jr., MD;  Location: [facility] ENDO;  Service: Pain Management;  Laterality: Bilateral;  Bilateral Lumbar Medial Branch Blocks L3, L4, L5    [id number]  [id number]    Arrive @ 0930; NO Sedation; ASA; Check BG    EPIDURAL STEROID INJECTION N/A 8/7/2019    Procedure: Injection, Steroid, Epidural Cervical;  Surgeon: Dariel Doan Jr., MD;  Location: [facility] ENDO;  Service: Pain Management;  Laterality: N/A;  Cervical Epidural Steroid Injection C7-T1    [id number]    Arrive @ 1115; Last ASA 7/30; Check BG    ESOPHAGOGASTRODUODENOSCOPY N/A 7/28/2023    Procedure: EGD (ESOPHAGOGASTRODUODENOSCOPY);  Surgeon: Jaylene Alvarado MD;  Location: [facility] ENDO;  Service: Endoscopy;  Laterality: N/A;  inst via portal    LEFT HEART CATHETERIZATION Left 9/21/2021    Procedure: Left heart cath 9am start, R rad access;  Surgeon: Dariel Conner MD;  Location: [facility] CATH LAB;  Service: Cardiology;  Laterality: Left;  RN PRE OP Covid NEGATIVE ON  9-20-21.  C A    MIDLINE CATHETER PLACEMENT  12/12/2023    NO PAST SURGERIES         Review of patient's allergies indicates:   Allergen Reactions    Tomato (solanum lycopersicum) Hives    Naproxen Hives    Shrimp Other (See Comments)       Medications:  Medications Prior to Admission   Medication Sig    acetaminophen (TYLENOL) 650 MG TbSR Take 1,300 mg by mouth every 6 (six) hours as needed (Pain).    ascorbic acid, vitamin C, (VITAMIN C) 500 MG tablet Take 500 mg by mouth once daily.    atorvastatin (LIPITOR) 40 MG tablet Take 1 tablet (40 mg total) by mouth once daily.    cyclobenzaprine (FLEXERIL) 10 MG tablet Take 10 mg by mouth 3 (three) times daily as needed for Muscle spasms.    docusate sodium (COLACE) 100 MG capsule Take 100 mg by mouth daily as needed  for Constipation.    furosemide (LASIX) 20 MG tablet Take 1 tablet (20 mg total) by mouth 2 (two) times daily.    gabapentin (NEURONTIN) 600 MG tablet TAKE 1 TABLET(600 MG) BY MOUTH THREE TIMES DAILY AS NEEDED    HYDROcodone-acetaminophen (NORCO) 5-325 mg per tablet Take 1 tablet by mouth every 6 (six) hours as needed for Pain.    PREVIDENT 5000 SENSITIVE 1.1-5 % Pste Brush teeth every other day as directed.    tamsulosin (FLOMAX) 0.4 mg Cap Take 0.4 mg by mouth once daily.    aspirin (ECOTRIN) 81 MG EC tablet Take 81 mg by mouth once daily.    blood pressure test kit-large Kit 1 Device by Misc.(Non-Drug; Combo Route) route 2 (two) times daily.    blood sugar diagnostic Strp To check BG 2 times daily, to use with insurance preferred meter    blood-glucose meter kit To check BG 2 times daily, to use with insurance preferred meter    diltiaZEM (CARDIZEM CD) 240 MG 24 hr capsule Take 1 capsule (240 mg total) by mouth once daily. (Patient not taking: Reported on 12/11/2023)    fluticasone-salmeterol diskus inhaler 500-50 mcg Inhale 1 puff into the lungs 2 (two) times daily. Controller    lancets Misc To check BG 2 times daily, to use with insurance preferred meter    PFIZER COVID BIVAL,12Y UP,,PF, 30 mcg/0.3 mL injection      Antibiotics (From admission, onward)      Start     Stop Route Frequency Ordered    12/14/23 0900  vancomycin 1.75 g in 5 % dextrose 500 mL IVPB         -- IV Every 24 hours (non-standard times) 12/13/23 2212    12/12/23 1700  meropenem (MERREM) 2 g in sodium chloride 0.9% 100 mL IVPB         -- IV Every 8 hours (non-standard times) 12/12/23 1537    12/11/23 0752  vancomycin - pharmacy to dose  (vancomycin IVPB (PEDS and ADULTS))        See Gerardo[facility] for full Linked Orders Report.    -- IV pharmacy to manage frequency 12/11/23 0654          Antifungals (From admission, onward)      None          Antivirals (From admission, onward)      None             Immunization History   Administered Date(s)  Administered    COVID-19, MRNA, LN-S, PF (MODERNA FULL 0.5 ML DOSE) 05/17/2022    COVID-19, MRNA, LN-S, PF (Pfizer) (Purple Cap) 09/22/2022    COVID-19, mRNA, LNP-S, PF, kim-sucrose, 30 mcg/0.3 mL (Pfizer 2023 Ages 12+) 10/12/2023    COVID-19, mRNA, LNP-S, bivalent booster, PF (Moderna Omicron)12 + YEARS 09/22/2022    COVID-19, mRNA, LNP-S, bivalent booster, PF (PFIZER OMICRON) 06/14/2023    COVID-19, vector-nr, rS-Ad26, PF (Apurva) 03/08/2021, 11/05/2021    Influenza 10/22/2012, 02/28/2014    Influenza (FLUAD) - Quadrivalent - Adjuvanted - PF *Preferred* (65+) 09/06/2023    Influenza (FLUAD) - Trivalent - Adjuvanted - PF (65+) 10/10/2018, 10/10/2018, 09/12/2019, 09/12/2019    Influenza - High Dose - PF (65 years and older) 10/23/2017, 10/23/2017    Influenza - Quadrivalent 09/23/2015    Influenza - Quadrivalent - High Dose - PF (65 years and older) 09/10/2020, 09/10/2020, 09/29/2021, 09/22/2022    Influenza - Quadrivalent - PF *Preferred* (6 months and older) 12/03/2016    Influenza - Trivalent (ADULT) 10/22/2012, 02/28/2014    Influenza Split 10/22/2012, 02/28/2014    Pneumococcal Conjugate - 13 Valent 02/19/2016, 10/10/2018, 10/10/2018    Pneumococcal Polysaccharide - 23 Valent 03/07/2017, 09/12/2019, 09/12/2019, 09/10/2020, 09/10/2020    RSVpreF (Arexvy) 10/12/2023    Tdap 02/19/2016       Family History       Problem Relation (Age of Onset)    Diabetes Paternal Grandfather    Heart disease Paternal Grandfather    Hypertension     No Known Problems Mother, Father, Sister, Brother, Maternal Aunt, Maternal Uncle, Paternal Aunt, Paternal Uncle, Maternal Grandmother, Maternal Grandfather, Paternal Grandmother          Social History     Socioeconomic History    Marital status:     Number of children: 8    Highest education level: 11th grade   Occupational History    Occupation:    Tobacco Use    Smoking status: Never    Smokeless tobacco: Never   Substance and Sexual Activity    Alcohol use:  Yes     Alcohol/week: 0.0 standard drinks of alcohol     Comment: occacionally    Drug use: No    Sexual activity: Not Currently     Social Determinants of Health     Financial Resource Strain: Low Risk  (12/12/2023)    Overall Financial Resource Strain (CARDIA)     Difficulty of Paying Living Expenses: Not very hard   Food Insecurity: No Food Insecurity (12/12/2023)    Hunger Vital Sign     Worried About Running Out of Food in the Last Year: Never true     Ran Out of Food in the Last Year: Never true   Transportation Needs: No Transportation Needs (12/12/2023)    PRAPARE - Transportation     Lack of Transportation (Medical): No     Lack of Transportation (Non-Medical): No   Physical Activity: Inactive (12/12/2023)    Exercise Vital Sign     Days of Exercise per Week: 0 days     Minutes of Exercise per Session: 0 min   Stress: Unknown (12/12/2023)    [employer] - Occupational Stress Questionnaire     Feeling of Stress : Patient refused   Social Connections: Unknown (12/12/2023)    Social Connection and Isolation Panel [NHANES]     Frequency of Communication with Friends and Family: More than three times a week     Frequency of Social Gatherings with Friends and Family: More than three times a week     Attends [employer] Services: Patient refused     Active Member of Clubs or Organizations: Patient refused     Attends Club or Organization Meetings: Patient refused     Marital Status:    Housing Stability: Unknown (12/12/2023)    Housing Stability Vital Sign     Unable to Pay for Housing in the Last Year: No     Unstable Housing in the Last Year: No     Review of Systems   Unable to perform ROS: Patient nonverbal     Objective:     Vital Signs (Most Recent):  Temp: 98.7 °F (37.1 °C) (12/14/23 0734)  Pulse: 76 (12/14/23 0915)  Resp: 20 (12/14/23 0915)  BP: (!) 157/89 (12/14/23 0734)  SpO2: (!) 94 % (12/14/23 0915) Vital Signs (24h Range):  Temp:  [97.7 °F (36.5 °C)-102.3 °F (39.1  °C)] 98.7 °F (37.1 °C)  Pulse:  [] 76  Resp:  [16-32] 20  SpO2:  [94 %-100 %] 94 %  BP: (118-157)/(65-94) 157/89     Weight: 120.2 kg (264 lb 15.9 oz)  Body mass index is 39.13 kg/m².    Estimated Creatinine Clearance: 72.3 mL/min (based on SCr of 1.2 mg/dL).     Physical Exam  Vitals reviewed.   Constitutional:       General: He is not in acute distress.     Appearance: He is ill-appearing. He is not toxic-appearing or diaphoretic.   HENT:      Nose: Nose normal.      Mouth/Throat:      Mouth: Mucous membranes are moist.      Pharynx: Oropharynx is clear.   Cardiovascular:      Rate and Rhythm: Regular rhythm. Tachycardia present.      Pulses: Normal pulses.      Heart sounds: Normal heart sounds. No murmur heard.  Pulmonary:      Effort: Pulmonary effort is normal. No respiratory distress.      Breath sounds: Normal breath sounds. No stridor.   Abdominal:      General: Abdomen is flat. There is no distension.      Palpations: Abdomen is soft.      Tenderness: There is no abdominal tenderness. There is no guarding or rebound.   Musculoskeletal:         General: Swelling present. No deformity. Normal range of motion.      Right lower leg: No edema.      Left lower leg: No edema.   Skin:     General: Skin is warm.      Findings: No lesion or rash.   Neurological:      Mental Status: He is disoriented.      Motor: Weakness present.      Gait: Gait abnormal.          Significant Labs: All pertinent labs within the past 24 hours have been reviewed.    Significant Imaging: I have reviewed all pertinent imaging results/findings within the past 24 hours.

## 2023-12-14 NOTE — ANESTHESIA PREPROCEDURE EVALUATION
[facility]  Anesthesia Pre-Operative Evaluation         Patient Name: Bj Pate Jr.  YOB: 1950  MRN: [id number]    SUBJECTIVE:     Pre-operative evaluation for Procedure(s) (LRB):  **LOOP X**FUSION, SPINE, LUMBAR, TLIF, POSTERIOR APPROACH, USING PEDICLE SCREW (N/A)     12/14/2023    Bj Pate Jr. is a 73 y.o. male w/ a significant PMHx of CVA with right-sided deficits,  diabetes, hypertension brought in by family after 3 weeks of abdominal pain with multiple visits to ED is without clear etiology.  CT abdomen and pelvis as well as MRI now concerning for  L3-L4 as well as L2-L3 diskitis/osteomyelitis and foraminal narrowing at L4-L5. NSGY planning for decompression.    Patient now presents for the above procedure(s).      LDA:        Peripheral IV - Single Lumen 12/11/23 0006 22 G Anterior;Right Shoulder (Active)   Site Assessment Clean;Dry;Intact 12/14/23 0400   Extremity Assessment Distal to IV No abnormal discoloration 12/13/23 2000   Line Status Saline locked 12/13/23 2000   Dressing Status Intact 12/13/23 2000   Dressing Intervention Integrity maintained 12/13/23 2000   Dressing Change Due 12/14/23 12/13/23 2000   Site Change Due 12/14/23 12/13/23 2000   Reason Not Rotated Not due 12/13/23 2000   Number of days: 3            Peripheral IV - Single Lumen 12/11/23 0821 20 G;1 in Posterior;Right Hand (Active)   Site Assessment Clean;Dry;Intact 12/14/23 0400   Extremity Assessment Distal to IV No abnormal discoloration 12/13/23 2000   Line Status Saline locked 12/13/23 2000   Dressing Status Intact 12/13/23 2000   Dressing Intervention Integrity maintained 12/13/23 2000   Dressing Change Due 12/14/23 12/13/23 2000   Site Change Due 12/14/23 12/13/23 2000   Reason Not Rotated Not due 12/13/23 2000   Number of days: 3            Midline Catheter Insertion/Assessment  - Single Lumen 12/12/23 0215 Left basilic vein (medial side of arm) 20g x 10cm (Active)   $ Midline Charges (Upon  insertion) Bedside Insertion Performed Pt > 3 Years Old;Midline Catheter (Supply);Ultrasound Guide for Vascular Access 12/12/23 0230   Site Assessment Clean;Dry;Intact 12/14/23 0400   IV Device Securement catheter securement device 12/13/23 2000   Line Status Infusing 12/14/23 0400   Dressing Type CHG impregnated dressing/sponge 12/13/23 2000   Dressing Status Clean;Dry;Intact 12/14/23 0400   Dressing Intervention Integrity maintained 12/13/23 2000   Dressing Change Due 12/15/23 12/13/23 2000   Site Change Due 12/15/23 12/13/23 2000   Reason Not Rotated Not due 12/13/23 2000   Number of days: 2            NG/OG Tube 12/14/23 0212 14 Fr. Right nostril (Active)   Intake (mL) 20 mL 12/14/23 0600   Water Bolus (mL) 150 mL 12/14/23 0400   Number of days: 0       Male External Urinary Catheter 12/12/23 1755 Small (Active)   Collection Container Standard drainage bag 12/13/23 2000   Skin no breakdown;no redness 12/13/23 2000   Tolerance no signs/symptoms of discomfort 12/13/23 2000   Output (mL) 175 mL 12/13/23 0455   Catheter Change Date 12/13/23 12/13/23 0800   Catheter Change Time 2300 12/12/23 2300   Number of days: 1       Prev airway: Mask Ventilation:  Moderately difficult with oral airway    Attempts:  1    Attempted By:  CRNA    Method of Intubation:  Video laryngoscopy    Blade:  Mcgarry 3    Laryngeal View Grade: Grade I - full view of cords      Difficult Airway Encountered?: No      Complications:  None    Airway Device:  Oral endotracheal tube    Airway Device Size:  7.5    Style/Cuff Inflation:  Cuffed    Inflation Amount (mL):  7    Tube secured:  22    Secured at:  The lips    Placement Verified By:  Capnometry    Complicating Factors:  None    Findings Post-Intubation:  BS equal bilateral and atraumatic/condition of teeth unchanged    Drips: None documented.      Patient Active Problem List   Diagnosis    Obstructive sleep apnea on CPAP: setting = 8    Morbid obesity with BMI of 45.0-49.9, adult     Chronic idiopathic gout of multiple sites    Chronic kidney disease, stage 3a    CKD (chronic kidney disease)    History of CVA (cerebrovascular accident)    History of cerebral parenchymal hemorrhage    Essential hypertension    Decreased spinal mobility    Decreased functional mobility and endurance    Cervical radiculopathy    Lumbar spondylosis    DDD (degenerative disc disease), lumbar    DDD (degenerative disc disease), cervical    Osteoarthritis of spine with radiculopathy, cervical region    Carpal tunnel syndrome on both sides    Ulnar neuropathy of both upper extremities    Left lateral epicondylitis    Left carpal tunnel syndrome    Generalized weakness    Acute kidney injury superimposed on chronic kidney disease    Transaminitis    Urinary retention    Chronic gout of left wrist    Polyarticular gout    Acute idiopathic gout of multiple sites    Urinary tract infection with hematuria    Hyperkalemia    Hyponatremia    Leukocytosis    Pseudomonas infection    Decreased strength, endurance, and mobility    Gait instability    Balance problem    Alteration in instrumental activities of daily living (IADL)    Decreased  strength    SOBOE (shortness of breath on exertion)    Anemia    Pulmonary hypertension    Mixed hyperlipidemia    Abnormal nuclear stress test    Reactive airway disease without complication    Hyperlipidemia, acquired    Aortic root dilatation    Colitis    Left hip pain    Torticollis    Acute osteomyelitis of lumbar spine    Severe sepsis    AMS (altered mental status)    Discitis    Osteomyelitis    Acute on chronic systolic heart failure    Encephalopathy acute    Malnutrition    Psoas muscle abscess    Back pain    Stercoral colitis       Review of patient's allergies indicates:   Allergen Reactions    Tomato (solanum lycopersicum) Hives    Naproxen Hives    Shrimp Other (See Comments)       Current Inpatient Medications:   atorvastatin  40 mg Oral Daily    fluticasone  furoate-vilanteroL  1 puff Inhalation Daily    gabapentin  600 mg Per NG tube TID    meropenem (MERREM) IVPB  2 g Intravenous Q8H    vancomycin (VANCOCIN) IV (PEDS and ADULTS)  1,750 mg Intravenous Q24H       No current facility-administered medications on file prior to encounter.     Current Outpatient Medications on File Prior to Encounter   Medication Sig Dispense Refill    acetaminophen (TYLENOL) 650 MG TbSR Take 1,300 mg by mouth every 6 (six) hours as needed (Pain).      ascorbic acid, vitamin C, (VITAMIN C) 500 MG tablet Take 500 mg by mouth once daily.      atorvastatin (LIPITOR) 40 MG tablet Take 1 tablet (40 mg total) by mouth once daily. 90 tablet 1    cyclobenzaprine (FLEXERIL) 10 MG tablet Take 10 mg by mouth 3 (three) times daily as needed for Muscle spasms.      docusate sodium (COLACE) 100 MG capsule Take 100 mg by mouth daily as needed for Constipation.      furosemide (LASIX) 20 MG tablet Take 1 tablet (20 mg total) by mouth 2 (two) times daily. 60 tablet 0    gabapentin (NEURONTIN) 600 MG tablet TAKE 1 TABLET(600 MG) BY MOUTH THREE TIMES DAILY AS NEEDED 90 tablet 1    HYDROcodone-acetaminophen (NORCO) 5-325 mg per tablet Take 1 tablet by mouth every 6 (six) hours as needed for Pain. 10 tablet 0    PREVIDENT 5000 SENSITIVE 1.1-5 % Pste Brush teeth every other day as directed.      tamsulosin (FLOMAX) 0.4 mg Cap Take 0.4 mg by mouth once daily.      aspirin (ECOTRIN) 81 MG EC tablet Take 81 mg by mouth once daily.      blood pressure test kit-large Kit 1 Device by Misc.(Non-Drug; Combo Route) route 2 (two) times daily. 1 each 0    blood sugar diagnostic Strp To check BG 2 times daily, to use with insurance preferred meter 200 strip 3    blood-glucose meter kit To check BG 2 times daily, to use with insurance preferred meter 1 each 0    diltiaZEM (CARDIZEM CD) 240 MG 24 hr capsule Take 1 capsule (240 mg total) by mouth once daily. (Patient not taking: Reported on 12/11/2023) 90 capsule 2     fluticasone-salmeterol diskus inhaler 500-50 mcg Inhale 1 puff into the lungs 2 (two) times daily. Controller 60 each 11    lancets Misc To check BG 2 times daily, to use with insurance preferred meter 200 each 3    PFIZER COVID BIVAL,12Y UP,,PF, 30 mcg/0.3 mL injection       [DISCONTINUED] nystatin (MYCOSTATIN) cream APPLY   TOPICALLY TO AFFECTED AREA TWICE DAILY 60 g 0       Past Surgical History:   Procedure Laterality Date    CARPAL TUNNEL RELEASE Left 2/19/2020    Procedure: RELEASE, CARPAL TUNNEL LEFT;  Surgeon: Maria Luisa Mccurdy MD;  Location: [facility] OR;  Service: Orthopedics;  Laterality: Left;    COLONOSCOPY N/A 7/28/2023    Procedure: COLONOSCOPY;  Surgeon: Jaylene Alvarado MD;  Location: [facility];  Service: Endoscopy;  Laterality: N/A;    EPIDURAL STEROID INJECTION N/A 5/2/2019    Procedure: Injection, Steroid, Epidural Cervical;  Surgeon: Dariel Doan Jr., MD;  Location: [facility];  Service: Pain Management;  Laterality: N/A;  Cervical Epidural Steroid Injection     [id number]    Arrive @ 1130; ASA; Check BG    EPIDURAL STEROID INJECTION Bilateral 5/29/2019    Procedure: Lumbar Medial Branch Blocks;  Surgeon: Dariel Doan Jr., MD;  Location: [facility];  Service: Pain Management;  Laterality: Bilateral;  Bilateral Lumbar Medial Branch Blocks L3, L4, L5    [id number]  [id number]    Attive @ 1030 (11 arrival request); NO Sedation; ASA; Check BG    EPIDURAL STEROID INJECTION Bilateral 7/3/2019    Procedure: Lumbar Medial Branch Blocks;  Surgeon: Dariel Doan Jr., MD;  Location: [facility];  Service: Pain Management;  Laterality: Bilateral;  Bilateral Lumbar Medial Branch Blocks L3, L4, L5    [id number]  [id number]    Arrive @ 0930; NO Sedation; ASA; Check BG    EPIDURAL STEROID INJECTION N/A 8/7/2019    Procedure: Injection, Steroid, Epidural Cervical;  Surgeon: Dariel Doan Jr., MD;  Location: [facility];  Service: Pain Management;  Laterality: N/A;  Cervical Epidural Steroid Injection  C7-T1    21711    Arrive @ 1115; Last ASA 7/30; Check BG    ESOPHAGOGASTRODUODENOSCOPY N/A 7/28/2023    Procedure: EGD (ESOPHAGOGASTRODUODENOSCOPY);  Surgeon: Jaylene Alvarado MD;  Location: [facility] ENDO;  Service: Endoscopy;  Laterality: N/A;  inst via portal    LEFT HEART CATHETERIZATION Left 9/21/2021    Procedure: Left heart cath 9am start, R rad access;  Surgeon: Dariel Conner MD;  Location: [facility] CATH LAB;  Service: Cardiology;  Laterality: Left;  RN PRE OP Covid NEGATIVE ON  9-20-21.  C A    MIDLINE CATHETER PLACEMENT  12/12/2023    NO PAST SURGERIES         Social History     Socioeconomic History    Marital status:     Number of children: 8    Highest education level: 11th grade   Occupational History    Occupation:    Tobacco Use    Smoking status: Never    Smokeless tobacco: Never   Substance and Sexual Activity    Alcohol use: Yes     Alcohol/week: 0.0 standard drinks of alcohol     Comment: occacionally    Drug use: No    Sexual activity: Not Currently     Social Determinants of Health     Financial Resource Strain: Low Risk  (12/12/2023)    Overall Financial Resource Strain (CARDIA)     Difficulty of Paying Living Expenses: Not very hard   Food Insecurity: No Food Insecurity (12/12/2023)    Hunger Vital Sign     Worried About Running Out of Food in the Last Year: Never true     Ran Out of Food in the Last Year: Never true   Transportation Needs: No Transportation Needs (12/12/2023)    PRAPARE - Transportation     Lack of Transportation (Medical): No     Lack of Transportation (Non-Medical): No   Physical Activity: Inactive (12/12/2023)    Exercise Vital Sign     Days of Exercise per Week: 0 days     Minutes of Exercise per Session: 0 min   Stress: Unknown (12/12/2023)    [employer] - Occupational Stress Questionnaire     Feeling of Stress : Patient refused   Social Connections: Unknown (12/12/2023)    Social Connection and Isolation Panel [NHANES]      Frequency of Communication with Friends and Family: More than three times a week     Frequency of Social Gatherings with Friends and Family: More than three times a week     Attends Sikhism Services: Patient refused     Active Member of Clubs or Organizations: Patient refused     Attends Club or Organization Meetings: Patient refused     Marital Status:    Housing Stability: Unknown (12/12/2023)    Housing Stability Vital Sign     Unable to Pay for Housing in the Last Year: No     Unstable Housing in the Last Year: No       OBJECTIVE:     Vital Signs Range (Last 24H):  Temp:  [36.5 °C (97.7 °F)-39.1 °C (102.3 °F)]   Pulse:  [105-146]   Resp:  [16-32]   BP: (113-157)/(65-94)   SpO2:  [92 %-100 %]       Significant Labs:  Lab Results   Component Value Date    WBC 17.74 (H) 12/14/2023    HGB 10.2 (L) 12/14/2023    HCT 31.2 (L) 12/14/2023     12/14/2023    CHOL 101 (L) 09/06/2023    TRIG 54 09/06/2023    HDL 59 09/06/2023    ALT 31 12/14/2023    AST 61 (H) 12/14/2023     12/14/2023    K 4.4 12/14/2023     12/14/2023    CREATININE 1.2 12/14/2023    BUN 34 (H) 12/14/2023    CO2 24 12/14/2023    TSH 1.794 12/13/2023    PSA 0.57 05/11/2023    INR 1.1 12/13/2023    HGBA1C 4.9 12/11/2023       Diagnostic Studies: No relevant studies.    EKG:   Results for orders placed or performed during the hospital encounter of 12/10/23   EKG 12-lead    Collection Time: 12/12/23  4:34 PM    Narrative    Test Reason : R79.89,    Vent. Rate : 129 BPM     Atrial Rate : 129 BPM     P-R Int : 122 ms          QRS Dur : 068 ms      QT Int : 298 ms       P-R-T Axes : 043 -17 080 degrees     QTc Int : 436 ms    Probably  Sinus tachycardia with occasional but cannot totally exclude A  Flutter  Otherwise normal ECG  When compared with ECG of 11-DEC-2023 20:20,  Artifact is less  Confirmed by Toan GREENBERG MD ([id number]) on 12/13/2023 9:22:07 AM    Referred By: [email or web address]   SELF           Confirmed By:Toan GREENBERG MD       2D  ECHO:  TTE:  Results for orders placed or performed during the hospital encounter of 12/10/23   Echo   Result Value Ref Range    RA Width 3.86 cm    Left Atrium Major Axis 6.30 cm    Left Atrium Minor Axis 4.67 cm    RA Major Axis 4.55 cm    LV Diastolic Volume 91.50 mL    LV Systolic Volume 43.81 mL    TR Max Roland 2.46 m/s    Ao VTI 15.38 cm    Ao peak roland 1.25 m/s    LVOT peak VTI 20.93 cm    LVOT peak roland 0.99 m/s    LVOT diameter 2.15 cm    AV mean gradient 4 mmHg    TAPSE 1.34 cm    RVDD 3.88 cm    LA size 2.66 cm    Ascending aorta 3.36 cm    STJ 2.86 cm    Sinus 3.09 cm    LVIDs 3.29 2.1 - 4.0 cm    Posterior Wall 0.97 0.6 - 1.1 cm    IVS 0.75 0.6 - 1.1 cm    LVIDd 4.48 3.5 - 6.0 cm    TDI LATERAL 0.13 m/s    LA WIDTH 3.31 cm    TDI SEPTAL 0.11 m/s    FS 27 (A) 28 - 44 %    LA volume 40.14 cm3    LV mass 124.08 g    ZLVIDD -7.45     ZLVIDS -4.32     Left Ventricle Relative Wall Thickness 0.43 cm    AV valve area 4.94 cm²    AV Velocity Ratio 0.79     AV index (prosthetic) 1.36     Mean e' 0.12 m/s    LVOT area 3.6 cm2    LVOT stroke volume 75.95 cm3    AV peak gradient 6 mmHg    LV Systolic Volume Index 18.8 mL/m2    LV Diastolic Volume Index 39.27 mL/m2    LA Volume Index 17.2 mL/m2    LV Mass Index 53 g/m2    Triscuspid Valve Regurgitation Peak Gradient 24 mmHg    HAILEE by Velocity Ratio 2.87 cm²    BSA 2.42 m2    TV resting pulmonary artery pressure 24 mmHg    RV TB RVSP 2 mmHg    Est. RA pres 0 mmHg    Narrative      Technically difficult portable study    Left Ventricle: The left ventricle is normal in size. There is   concentric remodeling. Mild global hypokinesis present. There is mildly   reduced systolic function with a visually estimated ejection fraction of   45 - 50%.    Right Ventricle: Normal right ventricular cavity size. Wall thickness   is normal. Right ventricle wall motion  is normal. Systolic function is   normal.    Pulmonary Artery: The estimated pulmonary artery systolic pressure is   at  least 24 mmHg.    The IVC was not well visualized.         CHUY:  No results found for this or any previous visit.    ASSESSMENT/PLAN:         Pre-op Assessment    I have reviewed the Patient Summary Reports.     I have reviewed the Nursing Notes. I have reviewed the NPO Status.   I have reviewed the Medications.     Review of Systems  Anesthesia Hx:  No problems with previous Anesthesia   History of prior surgery of interest to airway management or planning:  Previous anesthesia: General       Airway issues documented on chart review include mask, difficult, videolaryngoscope used     Denies Family Hx of Anesthesia complications.    Denies Personal Hx of Anesthesia complications.                    Hematology/Oncology:    Oncology Normal    -- Anemia:                                  EENT/Dental:  EENT/Dental Normal           Cardiovascular:     Hypertension                     Shortness of Breath                    Hypertension         Pulmonary:   COPD Asthma  Shortness of breath  Sleep Apnea   Asthma:   Chronic Obstructive Pulmonary Disease (COPD):           Obstructive Sleep Apnea (HILARIO).           Renal/:  Chronic Renal Disease        Kidney Function/Disease             Musculoskeletal:  Arthritis        Arthritis     Spine Disorders: lumbar Disc disease           Neurological:   CVA Neuromuscular Disease,           Arthritis              CVA - Cerebrovasular Accident               Neuromuscular Disease   Endocrine:  Diabetes    Diabetes                          Physical Exam  General: Well nourished, Cooperative, Alert and Oriented    Airway:  Mallampati: II   Mouth Opening: Normal  TM Distance: Normal  Tongue: Normal  Neck ROM: Normal ROM    Dental:  Partial Dentures, Periodontal disease        Anesthesia Plan  Type of Anesthesia, risks & benefits discussed:    Anesthesia Type: Gen ETT  Intra-op Monitoring Plan: Standard ASA Monitors  Post Op Pain Control Plan: multimodal analgesia and IV/PO Opioids  PRN  Induction:  IV  Airway Plan: Video, Post-Induction  Informed Consent: Informed consent signed with the Patient and all parties understand the risks and agree with anesthesia plan.  All questions answered.   ASA Score: 3  Day of Surgery Review of History & Physical: H&P Update referred to the surgeon/provider.    Ready For Surgery From Anesthesia Perspective.     .

## 2023-12-14 NOTE — ASSESSMENT & PLAN NOTE
CT AP w rectal colitis vs constipation    - brown bomb ordered with minimal output  - Bowel regimen through NG tube

## 2023-12-14 NOTE — ANESTHESIA POSTPROCEDURE EVALUATION
Anesthesia Post Evaluation    Patient: Bj Pate Jr.    Procedure(s) Performed: * No procedures listed *    Final Anesthesia Type: general      Patient location during evaluation: PACU  Patient participation: Yes- Able to Participate  Level of consciousness: awake  Post-procedure vital signs: reviewed and stable  Pain management: adequate  Airway patency: patent    PONV status at discharge: No PONV  Anesthetic complications: no      Cardiovascular status: blood pressure returned to baseline  Respiratory status: unassisted, spontaneous ventilation and room air                Vitals Value Taken Time   /87 12/14/23 1105   Temp 37.2 °C (98.9 °F) 12/14/23 1105   Pulse 108 12/14/23 1128   Resp 18 12/14/23 1105   SpO2 95 % 12/14/23 1105         Event Time   Out of Recovery 12/13/2023 18:34:17         Pain/Matthew Score: Pain Rating Prior to Med Admin: 0 (12/13/2023 12:05 PM)  Matthew Score: 10 (12/13/2023  6:15 PM)

## 2023-12-14 NOTE — ASSESSMENT & PLAN NOTE
Likely secondary to sepsis. Patient with history of prior CVA.     Continue antiobiotics and attempting to achieve source control with IR/NSGY   Cefepime discontinued   Neurology and vascular neurology consulted  MRI brain reviewed by vascular neurology  Trend CMP

## 2023-12-14 NOTE — PT/OT/SLP EVAL
Speech Language Pathology Evaluation  Bedside Swallow    Patient Name:  Bj Pate Jr.   MRN:  [id number]  959/959 A    Admitting Diagnosis: Severe sepsis    Recommendations:                 General Recommendations:  Dysphagia therapy, Speech language evaluation, and Cognitive-linguistic evaluation  Diet recommendations:  NPO, NPO   Aspiration Precautions: Frequent oral care and Strict aspiration precautions   General Precautions: Standard, NPO, aspiration, fall  Communication strategies:  none    Assessment:     Bj Pate Jr. is a 73 y.o. male with poor po acceptance. ST will continue to follow.     History:     Past Medical History:   Diagnosis Date    Acute on chronic systolic heart failure 12/13/2023    Anemia 06/04/2021    Anticoagulant long-term use     Basal ganglia hemorrhage     Left basal ganglia hemorrhage with resultant right-sided hemiparesis which has resolved.     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        Past Surgical History:   Procedure Laterality Date    CARPAL TUNNEL RELEASE Left 2/19/2020    Procedure: RELEASE, CARPAL TUNNEL LEFT;  Surgeon: Maria Luisa Mccurdy MD;  Location: [facility] OR;  Service: Orthopedics;  Laterality: Left;    COLONOSCOPY N/A 7/28/2023    Procedure: COLONOSCOPY;  Surgeon: Jaylene Alvarado MD;  Location: [facility];  Service: Endoscopy;  Laterality: N/A;    EPIDURAL STEROID INJECTION N/A 5/2/2019    Procedure: Injection, Steroid, Epidural  Cervical;  Surgeon: Dariel Doan Jr., MD;  Location: [facility] ENDO;  Service: Pain Management;  Laterality: N/A;  Cervical Epidural Steroid Injection     [id number]    Arrive @ 1130; ASA; Check BG    EPIDURAL STEROID INJECTION Bilateral 5/29/2019    Procedure: Lumbar Medial Branch Blocks;  Surgeon: Dariel Doan Jr., MD;  Location: [facility] ENDO;  Service: Pain Management;  Laterality: Bilateral;  Bilateral Lumbar Medial Branch Blocks L3, L4, L5    [id number]  [id number]    Attive @ 1030 (11 arrival request); NO Sedation; ASA; Check BG    EPIDURAL STEROID INJECTION Bilateral 7/3/2019    Procedure: Lumbar Medial Branch Blocks;  Surgeon: Dariel Doan Jr., MD;  Location: [facility] ENDO;  Service: Pain Management;  Laterality: Bilateral;  Bilateral Lumbar Medial Branch Blocks L3, L4, L5    29946  [id number]    Arrive @ 0930; NO Sedation; ASA; Check BG    EPIDURAL STEROID INJECTION N/A 8/7/2019    Procedure: Injection, Steroid, Epidural Cervical;  Surgeon: Dairel Doan Jr., MD;  Location: [facility] ENDO;  Service: Pain Management;  Laterality: N/A;  Cervical Epidural Steroid Injection C7-T1    94733    Arrive @ 1115; Last ASA 7/30; Check BG    ESOPHAGOGASTRODUODENOSCOPY N/A 7/28/2023    Procedure: EGD (ESOPHAGOGASTRODUODENOSCOPY);  Surgeon: Jaylene Alvarado MD;  Location: [facility] ENDO;  Service: Endoscopy;  Laterality: N/A;  inst via portal    LEFT HEART CATHETERIZATION Left 9/21/2021    Procedure: Left heart cath 9am start, R rad access;  Surgeon: Dariel Conner MD;  Location: [facility] CATH LAB;  Service: Cardiology;  Laterality: Left;  RN PRE OP Covid NEGATIVE ON  9-20-21.  C A    MIDLINE CATHETER PLACEMENT  12/12/2023    NO PAST SURGERIES       MD note 12/13: HPI: This is a 73M with a h/o CVA  about 8 to 10 years ago with R sided deficits, DM, HTN who was BIB from home with family with 3 weeks of abdominal pain. Seen at multiple EDS, and generally unremarkable work up as per family. Within last few days started to have  progressive worsening back pain. Became acute altered 2 days ago w/, LE weakness now requiring assistance to walk, as well as urinary incontinence. Patient is A/O 0x at admit. Accompanied by Daughter, most of history from her.   Admission to ED patient was febrile at 102 F, tachycardic 130, tachypneic at 23, hypertensive at 140/72 initially satting well on room air later requiring 2 L nasal cannula.  WBCs elevated 12.74 anemia 11.0 platelets normal, sed rate 120+, BUN 27 magnesium 1.5, albumin 2.4, .1, troponin 0.126 urine trace proteinuria trace ketones lactate initially elevated 2.6 later 1.3 CT head unremarkable for acute processes CT abdomen and pelvis and MRI lumbar spine shows left psoas abscess L2-L3 osteomyleitis, and diskitis.  Neurosurgery was consulted recommendations are pending.  Received Cefepime and Vanc  Overview/Hospital Course:  Mr. Pate was admitted for abdominal pain and worsening mental status, and was found to have a collection in his left psoas muscle, diskitis, and osteomyelitis in the L2-L3 region.  He was placed on vancomycin and meropenem for continuing decline in mental status.  Patient to receive abscess drainage with Interventional Radiology today, and neurosurgery is following along with plans for decompressive surgery on 12/15/2023.  Due to his poor mental status, an NG tube and tube feedings were ordered to assist with his nutrition status.  Interval History:  Overnight fever of 100.5, treated with Tylenol.  We he appeared uncomfortable, and in pain yesterday, and this was treated with IV morphine which seemed to alleviate most of his discomfort.  There is a slight elevation in his bilirubin at 1.4, but there was no abdominal tenderness in the right upper quadrant - no facial grimace or involuntary guarding.  He has remained persistently tachycardic with rates greater than 120.  Due to MRI findings, there was concern for a possible pontine infarct, but vascular neurology was  consulted and feel that the findings are more likely to be artifact and did not explain the drastic decline in his mental status.  His RCRI was completed for his procedures upcoming this week.  Due to poor oral intake he was started on maintenance IV fluids at 50 mL/hr.  NG tube with tube feeds planned after his IR procedure today.  His CT abdomen pelvis shows some concerning signs of possible colitis versus constipation so a brown bone was ordered to alleviate any possible source of pain or discomfort as he is unable to vocalize this.     Chest X-Rays: Single chest view is submitted.  There is diminished depth of inspiration, there is mild elevation of the right hemidiaphragm.  There is mild rotation.  When accounting for these factors the cardiomediastinal silhouette appears stable.  Asymmetric density associated with the left hemithorax is likely due to positioning and overlying soft tissue attenuation.  Accentuation of pulmonary bronchovascular markings consistent with diminished depth of inspiration noted.  There is appearance that may relate to a component of superimposed mild pulmonary vascular prominence, and mild pattern of edema.  There is no focal consolidation, there is no significant pleural effusion and no evidence for pneumothorax.  The osseous structures demonstrate chronic change.    Subjective     SLP communicated with RN prior to entry. RN cleared SLP to administer po trials.   Patient awake.   Patient goals: no vocalizations     Pain/Comfort:   No pain indicated or reported    Objective:     Oral Musculature Evaluation  Oral Musculature: unable to assess due to poor participation/comprehension    Patient did not vocalize, follow simple commands, or answer simple questions. He did not track SLP around room.     Bedside Swallow Eval:   Consistencies Assessed:  Thin liquids ice chip, attempted cup/spoon/straw sips  Puree attempted     Oral Phase:   Decreased closure around utensil  Poor oral  acceptance  Ice chip fell out of oral cavity and patient refusing all further trials despite encouragement. Patient clamping mouth closed and not allowing oral care, suctioning, or further po trials.     Pharyngeal Phase:   No pharyngeal swallow 2/2 patient with poor po acceptance    Compensatory Strategies  None    Treatment: SLP provided patient education on SLP role, goals, and POC. Patient did not demonstrate understanding.     Goals:   Multidisciplinary Problems       SLP Goals          Problem: SLP    Goal Priority Disciplines Outcome   SLP Goal     SLP Ongoing, Progressing   Description: Short Term Goals:   1. Pt will participate in an ongoing assessment to determine the least restrictive and safest diet with possible updated goals to follow pending results.  2. When appropriate, pt will participate in a speech, language, and cognitive evaluation with possible updated goals to follow pending results.                         Plan:     Patient to be seen:  4 x/week   Plan of Care expires:  01/12/24  Plan of Care reviewed with:  patient   SLP Follow-Up:  Yes       Discharge recommendations:   (tbd)   Barriers to Discharge:  Level of Skilled Assistance Needed      Time Tracking:     SLP Treatment Date:   12/14/23  Speech Start Time:  0750  Speech Stop Time:  0758     Speech Total Time (min):  8 min    Billable Minutes: Eval Swallow and Oral Function 8    12/14/2023

## 2023-12-14 NOTE — SUBJECTIVE & OBJECTIVE
"Interval History:  Overnight temperature up to 99.4, improved compared to previous 24 hours. More verbal and alert today with groan and yelling when he was repositioned. Tracking well with eyes but still unresponsive and not following commands. Bilirubin down to 1.2 from 1.4, but there was no abdominal tenderness in the right upper quadrant - no facial grimace or involuntary guarding.  He has remained persistently tachycardic but rates are lower in the 110s compared to yesterday. Due to poor oral intake he was started on tube feeds. He received the NG tube overnight, but pulled it out requiring replacement. Soft restraints ordered. IR performed biopsy of disc, but were unable to aspirate any fluid from psoas collection. Patient received "brown bomb" enema this morning at 6am with no stool reported before rounds.     Review of Systems   Unable to perform ROS: Patient nonverbal     Objective:     Vital Signs (Most Recent):  Temp: 98.9 °F (37.2 °C) (12/14/23 1105)  Pulse: 108 (12/14/23 1128)  Resp: 18 (12/14/23 1105)  BP: 135/87 (12/14/23 1105)  SpO2: 95 % (12/14/23 1105) Vital Signs (24h Range):  Temp:  [97.7 °F (36.5 °C)-100.7 °F (38.2 °C)] 98.9 °F (37.2 °C)  Pulse:  [] 108  Resp:  [16-24] 18  SpO2:  [94 %-100 %] 95 %  BP: (122-157)/(74-94) 135/87     Weight: 120.2 kg (264 lb 15.9 oz)  Body mass index is 39.13 kg/m².    Intake/Output Summary (Last 24 hours) at 12/14/2023 1321  Last data filed at 12/14/2023 0600  Gross per 24 hour   Intake 380 ml   Output 300 ml   Net 80 ml         Physical Exam  Vitals and nursing note reviewed. Exam conducted with a chaperone present.   Constitutional:       General: He is not in acute distress.     Appearance: He is obese. He is ill-appearing.   HENT:      Head: Normocephalic and atraumatic.   Eyes:      Extraocular Movements: Extraocular movements intact.      Pupils: Pupils are equal, round, and reactive to light.   Cardiovascular:      Rate and Rhythm: Normal rate and " regular rhythm.      Pulses: Normal pulses.      Heart sounds: Normal heart sounds.   Pulmonary:      Effort: Pulmonary effort is normal.      Breath sounds: Normal breath sounds.   Abdominal:      General: There is no distension.      Palpations: Abdomen is soft.      Tenderness: There is no abdominal tenderness.   Musculoskeletal:      Cervical back: Normal range of motion.      Right lower leg: Edema present.      Left lower leg: Edema present.   Skin:     General: Skin is warm and dry.   Neurological:      Motor: Weakness present.      Comments: Patient nonverbal and not following commands. Tracks movement with eyes.   Withdraws all four extremities to noxious stimuli.             Significant Labs: All pertinent labs within the past 24 hours have been reviewed.  CBC:   Recent Labs   Lab 12/13/23 0313 12/13/23  1212 12/14/23  0622   WBC 14.93*  --  17.74*   HGB 9.4*  --  10.2*   HCT 28.3* 29* 31.2*     --  341     CMP:   Recent Labs   Lab 12/12/23 2206 12/13/23 0313 12/14/23  0622    137 139   K 4.0 3.9 4.4    103 106   CO2 27 25 24   GLU 96 97 125*   BUN 24* 24* 34*   CREATININE 1.1 1.2 1.2   CALCIUM 9.6 9.2 9.8   PROT 6.8 6.2 6.8   ALBUMIN 1.6* 1.5* 1.5*   BILITOT 1.5* 1.4* 1.2*   ALKPHOS 79 70 81   AST 41* 32 61*   ALT 32 26 31   ANIONGAP 7* 9 9     Magnesium:   Recent Labs   Lab 12/13/23 0313 12/14/23  0622   MG 2.3 2.3     POCT Glucose:   Recent Labs   Lab 12/13/23 2127 12/14/23  0745 12/14/23  1302   POCTGLUCOSE 117* 165* 164*       Significant Imaging: I have reviewed all pertinent imaging results/findings within the past 24 hours.

## 2023-12-14 NOTE — ASSESSMENT & PLAN NOTE
72 yo male with PMH of CVA, CKD3, HTN, DM2, COPD who presents with increasing lethargy and confusion, who sustained a fall, and then reported urinary retention, lower extremity weakness.     MRI lumbar spine noting L2-3 discitis and possible L3-4 osteo, as well as left psoas abscess (2cm). Unable to exclude epidural abscess. MRI of brain, cervical spine, thoracic spine limited study due to motion, vascular neurology though noting low suspicion for infarct based on findings. Blood cultures from admit are negative thus far.     S/p IR aspiration attempt, unable to get fluid, sp L2-L3 disc space biopsy, pending. Pending NSGY plans. Tmax 102.3 yesterday, afebrile thus far today. Continues with leukocytosis. On meropenem/vanc. Daughter at bedside and feels pt seems slightly more alert and is responding to her voice, although still remains altered. Pt with small skin tear noted to right flank. Without s/s of infection.       Recommendations:  - continue vanc. Inpatient pharm to dose. Trough goal 15-20  - continue meropenem 2g IV q8hr.   - will follow culture data and tailor antibiotics as needed   - plan reviewed with ID staff. ID will follow.    Inpatient Admission Authorization Request   NOTIFICATION OF INPATIENT ADMISSION/INPATIENT AUTHORIZATION REQUEST   SERVICING FACILITY:   [place] [address]  Tax ID: [id number]  NPI: [id number]  [facility] [address] [facility]  [address]  Place of Service Code: 24     ATTENDING PROVIDER:  Attending Name and NPI#: Missy Bonilla Md [[id number]]  Address: [address] [place] [address]  Phone: [phone]     UTILIZATION REVIEW CONTACT:  Victorino Marie Utilization   Network Utilization Review Department  Phone: [phone]  Fax [phone]  Email: [email or web address]     PHYSICIAN ADVISORY SERVICES:  FOR [id number] REVIEW - MEDICAL NECESSITY DENIAL  Phone: [phone]  Fax: [phone]  Email: [email or web address]     TYPE OF REQUEST:  Inpatient Status     ADMISSION INFORMATION:  ADMISSION DATE/TIME: 8/14/22  1:18 AM  PATIENT DIAGNOSIS CODE/DESCRIPTION:  CHF (congestive heart failure) ([facility]) [I50 9]  Peripheral edema [R60 9]  JUANITA (acute kidney injury) ([facility] ) [N17 9]  Atrial fibrillation with RVR ([facility]) [I48 91]  Pain and swelling of lower leg [M79 669, M79 89]  DISCHARGE DATE/TIME: No discharge date for patient encounter  IMPORTANT INFORMATION:  Please contact Mayi Martinez directly with any questions or concerns regarding this request  Department voicemails are confidential     Send requests for admission clinical reviews, concurrent reviews, approvals, and administrative denials due to lack of clinical to fax [phone]

## 2023-12-14 NOTE — PROGRESS NOTES
Outpatient Care Management  Plan of Care Follow Up Visit    Patient: Bj Pate Jr.  MRN: [id number]  Date of Service: 12/14/2023  Completed by: Sharla Sheriff RN  Referral Date:     Reason for Visit   Patient presents with    OPCM Chart Review     D/T inpt status       Brief Summary: OPCM RN Case Management chart review d/t inpt status.      
Yes

## 2023-12-14 NOTE — ASSESSMENT & PLAN NOTE
Maintenance IVFs  NG tube placement ordered  RDN consulted, appreciate recs  Tube feeds to start after IR procedure, NPO at midnight for surgical decompression

## 2023-12-14 NOTE — PLAN OF CARE
Problem: SLP  Goal: SLP Goal  Description: Short Term Goals:   1. Pt will participate in an ongoing assessment to determine the least restrictive and safest diet with possible updated goals to follow pending results.  2. When appropriate, pt will participate in a speech, language, and cognitive evaluation with possible updated goals to follow pending results.    Outcome: Ongoing    Bedside swallow study initiated. Patient with poor po acceptance. Recommend: NPO, continue alternative means of nutrition/hydration/medication, frequent oral care, and aspiration precautions. ST will continue to follow.

## 2023-12-14 NOTE — NURSING
Received pt from PACU transporter @ 1845. When assessing pt from coming back  from PACU noticed the NG tube that I.R. stated was placed while pt was there was coiled in pt's mouth. This nurse repositioned pt into correct position for NG reinsertion and attempted again but failed. Called Rapid Team line and spoke to Pedro that stated would try NG insertion when he comes to round on pt. Dr Lau, charge nurse, and oncoming nurse informed. Informed Dr Lovelace of issue as well.

## 2023-12-14 NOTE — PLAN OF CARE
Problem: Adjustment to Illness (Sepsis/Septic Shock)  Goal: Optimal Coping  Outcome: Ongoing, Progressing     Problem: Glycemic Control Impaired (Sepsis/Septic Shock)  Goal: Blood Glucose Level Within Desired Range  Outcome: Ongoing, Progressing     Problem: Infection Progression (Sepsis/Septic Shock)  Goal: Absence of Infection Signs and Symptoms  Outcome: Ongoing, Progressing     Problem: Impaired Wound Healing  Goal: Optimal Wound Healing  Outcome: Ongoing, Progressing     Problem: Skin Injury Risk Increased  Goal: Skin Health and Integrity  Outcome: Ongoing, Progressing    POC discussed with patient daughter at bedside. Pt oriented to voice, responds to pain, does not follow commands, does not nod--non verbal. NGT tube placed by rapid response nurse; awaiting ok to use by provider.

## 2023-12-14 NOTE — PROGRESS NOTES
Pharmacokinetic Assessment Follow Up: IV Vancomycin    Vancomycin serum concentration assessment(s):    The trough level was drawn correctly and can be used to guide therapy at this time. The measurement is above the desired definitive target range of 15 to 20 mcg/mL.    Vancomycin Regimen Plan:    Change regimen to Vancomycin 1750 mg IV every 24 hours with next serum trough concentration measured at 60 mins prior to 3rd dose on 12/16/23 0800.    Drug levels (last 3 results):  Recent Labs   Lab Result Units 12/13/23  2110   Vancomycin-Trough ug/mL 22.0       Pharmacy will continue to follow and monitor vancomycin.    Please contact pharmacy at extension [phone] for questions regarding this assessment.    Thank you for the consult,   Ligia Li       Patient brief summary:  Bj Pate Jr. is a 73 y.o. male initiated on antimicrobial therapy with IV Vancomycin for treatment of bone/joint infection        Drug Allergies:   Review of patient's allergies indicates:   Allergen Reactions    Tomato (solanum lycopersicum) Hives    Naproxen Hives    Shrimp Other (See Comments)       Actual Body Weight:   120.2 kg    Renal Function:   Estimated Creatinine Clearance: 72.3 mL/min (based on SCr of 1.2 mg/dL).,     Dialysis Method (if applicable):  N/A    CBC (last 72 hours):  Recent Labs   Lab Result Units 12/11/23 0722 12/11/23 2108 12/12/23 0533 12/13/23  0313   WBC K/uL 11.86 16.33* 16.18* 14.93*   Hemoglobin g/dL 11.0* 11.1* 11.0* 9.4*   Hemoglobin A1C % 4.9  --   --   --    Hematocrit % 33.5* 33.5* 34.3* 28.3*   Platelets K/uL 264 281 221 284   Gran % % 74.4* 83.4* 79.3* 82.8*   Lymph % % 10.0* 3.8* 5.1* 5.3*   Mono % % 14.8 12.0 14.4 10.9   Eosinophil % % 0.2 0.0 0.0 0.2   Basophil % % 0.2 0.1 0.1 0.1   Differential Method  Automated Automated Automated Automated       Metabolic Panel (last 72 hours):  Recent Labs   Lab Result Units 12/11/23  0722 12/11/23 2108 12/12/23 0533 12/12/23  2206 12/13/23  0318    Sodium mmol/L 138 136 136 136 137   Potassium mmol/L 4.4 4.2 4.6 4.0 3.9   Chloride mmol/L 102 102 103 102 103   CO2 mmol/L 26 25 20* 27 25   Glucose mg/dL 99 99 91 96 97   BUN mg/dL 23 21 22 24* 24*   Creatinine mg/dL 1.1 1.2 1.1 1.1 1.2   Albumin g/dL 2.1*  --  1.7* 1.6* 1.5*   Total Bilirubin mg/dL 1.3*  --  1.8* 1.5* 1.4*   Alkaline Phosphatase U/L 78  --  77 79 70   AST U/L 55*  --  51* 41* 32   ALT U/L 30  --  35 32 26   Magnesium mg/dL 1.6 1.6 1.9  --  2.3   Phosphorus mg/dL 3.9  --  3.5  --  3.2       Vancomycin Administrations:  vancomycin given in the last 96 hours                     vancomycin 2 g in dextrose 5 % 500 mL IVPB (mg) 2,000 mg New Bag 12/12/23 2248     2,000 mg New Bag 12/11/23 2310    vancomycin (VANCOCIN) 2,500 mg in dextrose 5 % (D5W) 500 mL IVPB (mg) 2,500 mg New Bag 12/11/23 0123                    Microbiologic Results:  Microbiology Results (last 7 days)       Procedure Component Value Units Date/Time    Blood culture x two cultures. Draw prior to antibiotics. [[id number]] Collected: 12/10/23 2007    Order Status: Completed Specimen: Blood from Peripheral, Antecubital, Left Updated: 12/13/23 2212     Blood Culture, Routine No Growth to date      No Growth to date      No Growth to date      No Growth to date    Narrative:      Aerobic and anaerobic    Blood culture x two cultures. Draw prior to antibiotics. [[id number]] Collected: 12/10/23 2007    Order Status: Completed Specimen: Blood from Peripheral, Hand, Left Updated: 12/13/23 2212     Blood Culture, Routine No Growth to date      No Growth to date      No Growth to date      No Growth to date    Narrative:      Aerobic and anaerobic    Gram stain [[id number]] Collected: 12/13/23 1703    Order Status: Completed Specimen: Abscess from Buttocks, Left Updated: 12/13/23 2104     Gram Stain Result No WBC's      No organisms seen    AFB Culture & Smear [[id number]] Collected: 12/13/23 1705    Order Status: Sent Specimen: Abscess from  Buttocks, Left Updated: 12/13/23 1930    Culture, Anaerobic [[id number]] Collected: 12/13/23 1703    Order Status: Sent Specimen: Abscess from Buttocks, Left Updated: 12/13/23 1929    Aerobic culture [[id number]] Collected: 12/13/23 1703    Order Status: Sent Specimen: Abscess from Buttocks, Left Updated: 12/13/23 1928    Fungus culture [[id number]] Collected: 12/13/23 1703    Order Status: Sent Specimen: Abscess from Buttocks, Left Updated: 12/13/23 1928    Blood culture [[id number]] Collected: 12/12/23 0033    Order Status: Completed Specimen: Blood Updated: 12/13/23 0613     Blood Culture, Routine No Growth to date      No Growth to date    Blood culture [[id number]] Collected: 12/12/23 0033    Order Status: Completed Specimen: Blood Updated: 12/13/23 0613     Blood Culture, Routine No Growth to date      No Growth to date

## 2023-12-14 NOTE — ASSESSMENT & PLAN NOTE
Results for orders placed during the hospital encounter of 12/10/23    Echo    Interpretation Summary    Technically difficult portable study    Left Ventricle: The left ventricle is normal in size. There is concentric remodeling. Mild global hypokinesis present. There is mildly reduced systolic function with a visually estimated ejection fraction of 45 - 50%.    Right Ventricle: Normal right ventricular cavity size. Wall thickness is normal. Right ventricle wall motion  is normal. Systolic function is normal.    Pulmonary Artery: The estimated pulmonary artery systolic pressure is at least 24 mmHg.    The IVC was not well visualized.

## 2023-12-14 NOTE — NURSING
Nurses Note -- 4 Eyes      12/13/2023    1900      Skin assessed during: Q Shift Change      [] No Altered Skin Integrity Present    []Prevention Measures Documented      [x] Yes- Altered Skin Integrity Present or Discovered   [x] LDA Added if Not in Epic (Describe Wound)   [] New Altered Skin Integrity was Present on Admit and Documented in LDA   [] Wound Image Taken    Wound Care Consulted? yes    Attending Nurse:  Amarilys MOROCHO    Second RN/Staff Member:  Sonia MOROCHO    Sacral ulcer ; triad cream

## 2023-12-15 ENCOUNTER — ANESTHESIA (OUTPATIENT)
Dept: SURGERY | Facility: HOSPITAL | Age: 73
DRG: 853 | End: 2023-12-15
Payer: MEDICARE

## 2023-12-15 LAB
ABO + RH BLD: NORMAL
ALBUMIN SERPL BCP-MCNC: 1.5 G/DL (ref 3.5–5.2)
ALP SERPL-CCNC: 92 U/L (ref 55–135)
ALT SERPL W/O P-5'-P-CCNC: 68 U/L (ref 10–44)
ANION GAP SERPL CALC-SCNC: 6 MMOL/L (ref 8–16)
AST SERPL-CCNC: 197 U/L (ref 10–40)
BACTERIA BLD CULT: NORMAL
BACTERIA BLD CULT: NORMAL
BASOPHILS # BLD AUTO: 0.03 K/UL (ref 0–0.2)
BASOPHILS NFR BLD: 0.2 % (ref 0–1.9)
BILIRUB SERPL-MCNC: 0.8 MG/DL (ref 0.1–1)
BLD GP AB SCN CELLS X3 SERPL QL: NORMAL
BUN SERPL-MCNC: 42 MG/DL (ref 8–23)
CALCIUM SERPL-MCNC: 9.4 MG/DL (ref 8.7–10.5)
CHLORIDE SERPL-SCNC: 106 MMOL/L (ref 95–110)
CO2 SERPL-SCNC: 27 MMOL/L (ref 23–29)
CREAT SERPL-MCNC: 1 MG/DL (ref 0.5–1.4)
DIFFERENTIAL METHOD BLD: ABNORMAL
EOSINOPHIL # BLD AUTO: 0 K/UL (ref 0–0.5)
EOSINOPHIL NFR BLD: 0.2 % (ref 0–8)
ERYTHROCYTE [DISTWIDTH] IN BLOOD BY AUTOMATED COUNT: 14.4 % (ref 11.5–14.5)
EST. GFR  (NO RACE VARIABLE): >60 ML/MIN/1.73 M^2
GLUCOSE SERPL-MCNC: 106 MG/DL (ref 70–110)
GRAM STN SPEC: NORMAL
HCT VFR BLD AUTO: 29.1 % (ref 40–54)
HGB BLD-MCNC: 9.9 G/DL (ref 14–18)
IMM GRANULOCYTES # BLD AUTO: 0.28 K/UL (ref 0–0.04)
IMM GRANULOCYTES NFR BLD AUTO: 1.8 % (ref 0–0.5)
LYMPHOCYTES # BLD AUTO: 1.1 K/UL (ref 1–4.8)
LYMPHOCYTES NFR BLD: 7 % (ref 18–48)
MAGNESIUM SERPL-MCNC: 2.2 MG/DL (ref 1.6–2.6)
MCH RBC QN AUTO: 30 PG (ref 27–31)
MCHC RBC AUTO-ENTMCNC: 34 G/DL (ref 32–36)
MCV RBC AUTO: 88 FL (ref 82–98)
MONOCYTES # BLD AUTO: 1.5 K/UL (ref 0.3–1)
MONOCYTES NFR BLD: 9.7 % (ref 4–15)
NEUTROPHILS # BLD AUTO: 12.6 K/UL (ref 1.8–7.7)
NEUTROPHILS NFR BLD: 81.1 % (ref 38–73)
NRBC BLD-RTO: 0 /100 WBC
PHOSPHATE SERPL-MCNC: 2.6 MG/DL (ref 2.7–4.5)
PLATELET # BLD AUTO: 364 K/UL (ref 150–450)
PMV BLD AUTO: 10.3 FL (ref 9.2–12.9)
POCT GLUCOSE: 105 MG/DL (ref 70–110)
POCT GLUCOSE: 119 MG/DL (ref 70–110)
POCT GLUCOSE: 137 MG/DL (ref 70–110)
POTASSIUM SERPL-SCNC: 4.3 MMOL/L (ref 3.5–5.1)
PROT SERPL-MCNC: 6.7 G/DL (ref 6–8.4)
RBC # BLD AUTO: 3.3 M/UL (ref 4.6–6.2)
SODIUM SERPL-SCNC: 139 MMOL/L (ref 136–145)
SPECIMEN OUTDATE: NORMAL
WBC # BLD AUTO: 15.58 K/UL (ref 3.9–12.7)

## 2023-12-15 PROCEDURE — 63048 LAM FACETEC &FORAMOT EA ADDL: CPT | Mod: 59,,, | Performed by: NEUROLOGICAL SURGERY

## 2023-12-15 PROCEDURE — 25500020 PHARM REV CODE 255: Performed by: STUDENT IN AN ORGANIZED HEALTH CARE EDUCATION/TRAINING PROGRAM

## 2023-12-15 PROCEDURE — 99900035 HC TECH TIME PER 15 MIN (STAT)

## 2023-12-15 PROCEDURE — 25000003 PHARM REV CODE 250

## 2023-12-15 PROCEDURE — 22842 INSERT SPINE FIXATION DEVICE: CPT | Mod: ,,, | Performed by: NEUROLOGICAL SURGERY

## 2023-12-15 PROCEDURE — D9220A PRA ANESTHESIA: ICD-10-PCS | Mod: ANES,,, | Performed by: ANESTHESIOLOGY

## 2023-12-15 PROCEDURE — 63052 LAM FACETC/FRMT ARTHRD LUM 1: CPT | Mod: ,,, | Performed by: NEUROLOGICAL SURGERY

## 2023-12-15 PROCEDURE — 85025 COMPLETE CBC W/AUTO DIFF WBC: CPT

## 2023-12-15 PROCEDURE — 27201423 OPTIME MED/SURG SUP & DEVICES STERILE SUPPLY: Performed by: NEUROLOGICAL SURGERY

## 2023-12-15 PROCEDURE — 01NB0ZZ RELEASE LUMBAR NERVE, OPEN APPROACH: ICD-10-PCS | Performed by: NEUROLOGICAL SURGERY

## 2023-12-15 PROCEDURE — 88305 TISSUE EXAM BY PATHOLOGIST: CPT | Mod: 26,,, | Performed by: STUDENT IN AN ORGANIZED HEALTH CARE EDUCATION/TRAINING PROGRAM

## 2023-12-15 PROCEDURE — 63600175 PHARM REV CODE 636 W HCPCS

## 2023-12-15 PROCEDURE — 20930 SP BONE ALGRFT MORSEL ADD-ON: CPT | Mod: ,,, | Performed by: NEUROLOGICAL SURGERY

## 2023-12-15 PROCEDURE — 84100 ASSAY OF PHOSPHORUS: CPT

## 2023-12-15 PROCEDURE — 87070 CULTURE OTHR SPECIMN AEROBIC: CPT | Mod: 59 | Performed by: NEUROLOGICAL SURGERY

## 2023-12-15 PROCEDURE — 36415 COLL VENOUS BLD VENIPUNCTURE: CPT

## 2023-12-15 PROCEDURE — 36415 COLL VENOUS BLD VENIPUNCTURE: CPT | Performed by: NEUROLOGICAL SURGERY

## 2023-12-15 PROCEDURE — 88304 TISSUE EXAM BY PATHOLOGIST: CPT | Mod: 26,,, | Performed by: STUDENT IN AN ORGANIZED HEALTH CARE EDUCATION/TRAINING PROGRAM

## 2023-12-15 PROCEDURE — C1729 CATH, DRAINAGE: HCPCS | Performed by: NEUROLOGICAL SURGERY

## 2023-12-15 PROCEDURE — 22633 ARTHRD CMBN 1NTRSPC LUMBAR: CPT | Mod: 22,,, | Performed by: NEUROLOGICAL SURGERY

## 2023-12-15 PROCEDURE — 22853 INSJ BIOMECHANICAL DEVICE: CPT | Mod: ,,, | Performed by: NEUROLOGICAL SURGERY

## 2023-12-15 PROCEDURE — 0SB20ZZ EXCISION OF LUMBAR VERTEBRAL DISC, OPEN APPROACH: ICD-10-PCS | Performed by: NEUROLOGICAL SURGERY

## 2023-12-15 PROCEDURE — C1734 ORTH/DEVIC/DRUG BN/BN,TIS/BN: HCPCS | Performed by: NEUROLOGICAL SURGERY

## 2023-12-15 PROCEDURE — 88311 DECALCIFY TISSUE: CPT | Mod: 26,,, | Performed by: STUDENT IN AN ORGANIZED HEALTH CARE EDUCATION/TRAINING PROGRAM

## 2023-12-15 PROCEDURE — 37000008 HC ANESTHESIA 1ST 15 MINUTES: Performed by: NEUROLOGICAL SURGERY

## 2023-12-15 PROCEDURE — 20000000 HC ICU ROOM

## 2023-12-15 PROCEDURE — 4A10X2Z MONITORING OF CENTRAL NERVOUS CONDUCTIVITY, EXTERNAL APPROACH: ICD-10-PCS | Performed by: NEUROLOGICAL SURGERY

## 2023-12-15 PROCEDURE — 87075 CULTR BACTERIA EXCEPT BLOOD: CPT | Performed by: NEUROLOGICAL SURGERY

## 2023-12-15 PROCEDURE — 87116 MYCOBACTERIA CULTURE: CPT | Performed by: NEUROLOGICAL SURGERY

## 2023-12-15 PROCEDURE — 25000003 PHARM REV CODE 250: Performed by: NURSE ANESTHETIST, CERTIFIED REGISTERED

## 2023-12-15 PROCEDURE — 87206 SMEAR FLUORESCENT/ACID STAI: CPT | Mod: 91 | Performed by: NEUROLOGICAL SURGERY

## 2023-12-15 PROCEDURE — 82962 GLUCOSE BLOOD TEST: CPT | Performed by: NEUROLOGICAL SURGERY

## 2023-12-15 PROCEDURE — 63600175 PHARM REV CODE 636 W HCPCS: Performed by: NURSE ANESTHETIST, CERTIFIED REGISTERED

## 2023-12-15 PROCEDURE — 27800903 OPTIME MED/SURG SUP & DEVICES OTHER IMPLANTS: Performed by: NEUROLOGICAL SURGERY

## 2023-12-15 PROCEDURE — 00NY0ZZ RELEASE LUMBAR SPINAL CORD, OPEN APPROACH: ICD-10-PCS | Performed by: NEUROLOGICAL SURGERY

## 2023-12-15 PROCEDURE — 36000711: Performed by: NEUROLOGICAL SURGERY

## 2023-12-15 PROCEDURE — 37000009 HC ANESTHESIA EA ADD 15 MINS: Performed by: NEUROLOGICAL SURGERY

## 2023-12-15 PROCEDURE — 25000003 PHARM REV CODE 250: Performed by: NEUROLOGICAL SURGERY

## 2023-12-15 PROCEDURE — D9220A PRA ANESTHESIA: ICD-10-PCS | Mod: CRNA,,, | Performed by: NURSE ANESTHETIST, CERTIFIED REGISTERED

## 2023-12-15 PROCEDURE — 36000710: Performed by: NEUROLOGICAL SURGERY

## 2023-12-15 PROCEDURE — A9585 GADOBUTROL INJECTION: HCPCS | Performed by: STUDENT IN AN ORGANIZED HEALTH CARE EDUCATION/TRAINING PROGRAM

## 2023-12-15 PROCEDURE — 80053 COMPREHEN METABOLIC PANEL: CPT

## 2023-12-15 PROCEDURE — 83735 ASSAY OF MAGNESIUM: CPT

## 2023-12-15 PROCEDURE — 63600175 PHARM REV CODE 636 W HCPCS: Performed by: NEUROLOGICAL SURGERY

## 2023-12-15 PROCEDURE — 87102 FUNGUS ISOLATION CULTURE: CPT | Mod: 59 | Performed by: NEUROLOGICAL SURGERY

## 2023-12-15 PROCEDURE — 86901 BLOOD TYPING SEROLOGIC RH(D): CPT | Performed by: NEUROLOGICAL SURGERY

## 2023-12-15 PROCEDURE — D9220A PRA ANESTHESIA: Mod: ANES,,, | Performed by: ANESTHESIOLOGY

## 2023-12-15 PROCEDURE — 88304 TISSUE EXAM BY PATHOLOGIST: CPT | Performed by: STUDENT IN AN ORGANIZED HEALTH CARE EDUCATION/TRAINING PROGRAM

## 2023-12-15 PROCEDURE — 0SG10J1 FUSION OF 2 OR MORE LUMBAR VERTEBRAL JOINTS WITH SYNTHETIC SUBSTITUTE, POSTERIOR APPROACH, POSTERIOR COLUMN, OPEN APPROACH: ICD-10-PCS | Performed by: NEUROLOGICAL SURGERY

## 2023-12-15 PROCEDURE — 11000001 HC ACUTE MED/SURG PRIVATE ROOM

## 2023-12-15 PROCEDURE — 87205 SMEAR GRAM STAIN: CPT | Mod: 59 | Performed by: NEUROLOGICAL SURGERY

## 2023-12-15 PROCEDURE — 88305 TISSUE EXAM BY PATHOLOGIST: CPT | Performed by: STUDENT IN AN ORGANIZED HEALTH CARE EDUCATION/TRAINING PROGRAM

## 2023-12-15 PROCEDURE — 25000003 PHARM REV CODE 250: Performed by: STUDENT IN AN ORGANIZED HEALTH CARE EDUCATION/TRAINING PROGRAM

## 2023-12-15 PROCEDURE — 94761 N-INVAS EAR/PLS OXIMETRY MLT: CPT

## 2023-12-15 PROCEDURE — D9220A PRA ANESTHESIA: Mod: CRNA,,, | Performed by: NURSE ANESTHETIST, CERTIFIED REGISTERED

## 2023-12-15 PROCEDURE — 99024 POSTOP FOLLOW-UP VISIT: CPT | Mod: ,,, | Performed by: PHYSICIAN ASSISTANT

## 2023-12-15 PROCEDURE — C1713 ANCHOR/SCREW BN/BN,TIS/BN: HCPCS | Performed by: NEUROLOGICAL SURGERY

## 2023-12-15 PROCEDURE — 22614 ARTHRD PST TQ 1NTRSPC EA ADD: CPT | Mod: ,,, | Performed by: NEUROLOGICAL SURGERY

## 2023-12-15 PROCEDURE — 63047 LAM FACETEC & FORAMOT LUMBAR: CPT | Mod: 59,,, | Performed by: NEUROLOGICAL SURGERY

## 2023-12-15 PROCEDURE — 0SG00AJ FUSION OF LUMBAR VERTEBRAL JOINT WITH INTERBODY FUSION DEVICE, POSTERIOR APPROACH, ANTERIOR COLUMN, OPEN APPROACH: ICD-10-PCS | Performed by: NEUROLOGICAL SURGERY

## 2023-12-15 DEVICE — SET SCREW EXPEDIUM VERSE UNITZ: Type: IMPLANTABLE DEVICE | Site: BACK | Status: FUNCTIONAL

## 2023-12-15 DEVICE — IMPLANTABLE DEVICE: Type: IMPLANTABLE DEVICE | Site: BACK | Status: FUNCTIONAL

## 2023-12-15 DEVICE — BONE 30CC CANCELLOUS CRUSHED: Type: IMPLANTABLE DEVICE | Site: BACK | Status: FUNCTIONAL

## 2023-12-15 DEVICE — CONNECTOR SPINAL SFX A5 5.5MM: Type: IMPLANTABLE DEVICE | Site: BACK | Status: FUNCTIONAL

## 2023-12-15 DEVICE — KIT MED BONE INFUSE: Type: IMPLANTABLE DEVICE | Site: BACK | Status: FUNCTIONAL

## 2023-12-15 DEVICE — SCREW BONE SPINAL 5.5 6 X 45MM: Type: IMPLANTABLE DEVICE | Site: BACK | Status: FUNCTIONAL

## 2023-12-15 DEVICE — SCREW INNER SINGLE SET TITANIU: Type: IMPLANTABLE DEVICE | Site: BACK | Status: FUNCTIONAL

## 2023-12-15 DEVICE — SCREW EXPEDIUM VERSE 5.5 6X45: Type: IMPLANTABLE DEVICE | Site: BACK | Status: FUNCTIONAL

## 2023-12-15 RX ORDER — VANCOMYCIN HYDROCHLORIDE 1 G/20ML
INJECTION, POWDER, LYOPHILIZED, FOR SOLUTION INTRAVENOUS
Status: DISCONTINUED | OUTPATIENT
Start: 2023-12-15 | End: 2023-12-15 | Stop reason: HOSPADM

## 2023-12-15 RX ORDER — DEXAMETHASONE SODIUM PHOSPHATE 4 MG/ML
INJECTION, SOLUTION INTRA-ARTICULAR; INTRALESIONAL; INTRAMUSCULAR; INTRAVENOUS; SOFT TISSUE
Status: DISCONTINUED | OUTPATIENT
Start: 2023-12-15 | End: 2023-12-15

## 2023-12-15 RX ORDER — PROPOFOL 10 MG/ML
VIAL (ML) INTRAVENOUS
Status: DISCONTINUED | OUTPATIENT
Start: 2023-12-15 | End: 2023-12-15

## 2023-12-15 RX ORDER — KETAMINE HCL IN 0.9 % NACL 50 MG/5 ML
SYRINGE (ML) INTRAVENOUS
Status: DISCONTINUED | OUTPATIENT
Start: 2023-12-15 | End: 2023-12-15

## 2023-12-15 RX ORDER — MIDAZOLAM HYDROCHLORIDE 1 MG/ML
INJECTION, SOLUTION INTRAMUSCULAR; INTRAVENOUS
Status: DISCONTINUED | OUTPATIENT
Start: 2023-12-15 | End: 2023-12-15

## 2023-12-15 RX ORDER — VASOPRESSIN 20 [USP'U]/ML
INJECTION, SOLUTION INTRAMUSCULAR; SUBCUTANEOUS
Status: DISCONTINUED | OUTPATIENT
Start: 2023-12-15 | End: 2023-12-15

## 2023-12-15 RX ORDER — LIDOCAINE HYDROCHLORIDE 20 MG/ML
INJECTION INTRAVENOUS
Status: DISCONTINUED | OUTPATIENT
Start: 2023-12-15 | End: 2023-12-15

## 2023-12-15 RX ORDER — SUCCINYLCHOLINE CHLORIDE 20 MG/ML
INJECTION INTRAMUSCULAR; INTRAVENOUS
Status: DISCONTINUED | OUTPATIENT
Start: 2023-12-15 | End: 2023-12-15

## 2023-12-15 RX ORDER — GADOBUTROL 604.72 MG/ML
10 INJECTION INTRAVENOUS
Status: COMPLETED | OUTPATIENT
Start: 2023-12-15 | End: 2023-12-15

## 2023-12-15 RX ORDER — ACETAMINOPHEN 10 MG/ML
INJECTION, SOLUTION INTRAVENOUS
Status: DISCONTINUED | OUTPATIENT
Start: 2023-12-15 | End: 2023-12-15

## 2023-12-15 RX ORDER — HYDROMORPHONE HYDROCHLORIDE 1 MG/ML
0.2 INJECTION, SOLUTION INTRAMUSCULAR; INTRAVENOUS; SUBCUTANEOUS EVERY 5 MIN PRN
Status: CANCELLED | OUTPATIENT
Start: 2023-12-15

## 2023-12-15 RX ORDER — ONDANSETRON 2 MG/ML
INJECTION INTRAMUSCULAR; INTRAVENOUS
Status: DISCONTINUED | OUTPATIENT
Start: 2023-12-15 | End: 2023-12-15

## 2023-12-15 RX ORDER — ONDANSETRON 2 MG/ML
4 INJECTION INTRAMUSCULAR; INTRAVENOUS ONCE AS NEEDED
Status: CANCELLED | OUTPATIENT
Start: 2023-12-15 | End: 2035-05-13

## 2023-12-15 RX ORDER — ROCURONIUM BROMIDE 10 MG/ML
INJECTION, SOLUTION INTRAVENOUS
Status: DISCONTINUED | OUTPATIENT
Start: 2023-12-15 | End: 2023-12-15

## 2023-12-15 RX ORDER — LIDOCAINE HYDROCHLORIDE AND EPINEPHRINE 10; 10 MG/ML; UG/ML
INJECTION, SOLUTION INFILTRATION; PERINEURAL
Status: DISCONTINUED | OUTPATIENT
Start: 2023-12-15 | End: 2023-12-15 | Stop reason: HOSPADM

## 2023-12-15 RX ORDER — FENTANYL CITRATE 50 UG/ML
INJECTION, SOLUTION INTRAMUSCULAR; INTRAVENOUS
Status: DISCONTINUED | OUTPATIENT
Start: 2023-12-15 | End: 2023-12-15

## 2023-12-15 RX ORDER — DIAZEPAM 10 MG/2ML
5 INJECTION INTRAMUSCULAR ONCE
Status: DISCONTINUED | OUTPATIENT
Start: 2023-12-15 | End: 2023-12-15

## 2023-12-15 RX ORDER — DROPERIDOL 2.5 MG/ML
0.62 INJECTION, SOLUTION INTRAMUSCULAR; INTRAVENOUS ONCE AS NEEDED
Status: CANCELLED | OUTPATIENT
Start: 2023-12-15 | End: 2035-05-13

## 2023-12-15 RX ADMIN — SODIUM CHLORIDE 0.2 MCG/KG/MIN: 9 INJECTION, SOLUTION INTRAVENOUS at 02:12

## 2023-12-15 RX ADMIN — MIDAZOLAM 1 MG: 1 INJECTION INTRAMUSCULAR; INTRAVENOUS at 02:12

## 2023-12-15 RX ADMIN — METHOCARBAMOL 500 MG: 100 INJECTION, SOLUTION INTRAMUSCULAR; INTRAVENOUS at 11:12

## 2023-12-15 RX ADMIN — PROPOFOL 50 MG: 10 INJECTION, EMULSION INTRAVENOUS at 02:12

## 2023-12-15 RX ADMIN — ROCURONIUM BROMIDE 45 MG: 10 INJECTION, SOLUTION INTRAVENOUS at 03:12

## 2023-12-15 RX ADMIN — ACETAMINOPHEN 1000 MG: 10 INJECTION, SOLUTION INTRAVENOUS at 05:12

## 2023-12-15 RX ADMIN — Medication 50 MG: at 02:12

## 2023-12-15 RX ADMIN — DEXAMETHASONE SODIUM PHOSPHATE 8 MG: 4 INJECTION, SOLUTION INTRAMUSCULAR; INTRAVENOUS at 02:12

## 2023-12-15 RX ADMIN — ONDANSETRON 4 MG: 2 INJECTION INTRAMUSCULAR; INTRAVENOUS at 05:12

## 2023-12-15 RX ADMIN — SODIUM CHLORIDE, SODIUM ACETATE ANHYDROUS, SODIUM GLUCONATE, POTASSIUM CHLORIDE, AND MAGNESIUM CHLORIDE: 526; 222; 502; 37; 30 INJECTION, SOLUTION INTRAVENOUS at 02:12

## 2023-12-15 RX ADMIN — SUGAMMADEX 200 MG: 100 INJECTION, SOLUTION INTRAVENOUS at 06:12

## 2023-12-15 RX ADMIN — ROCURONIUM BROMIDE 5 MG: 10 INJECTION, SOLUTION INTRAVENOUS at 02:12

## 2023-12-15 RX ADMIN — GADOBUTROL 10 ML: 604.72 INJECTION INTRAVENOUS at 02:12

## 2023-12-15 RX ADMIN — SUCCINYLCHOLINE CHLORIDE 160 MG: 20 INJECTION, SOLUTION INTRAMUSCULAR; INTRAVENOUS at 02:12

## 2023-12-15 RX ADMIN — VASOPRESSIN 2 UNITS: 20 INJECTION INTRAVENOUS at 03:12

## 2023-12-15 RX ADMIN — SODIUM CHLORIDE: 0.9 INJECTION, SOLUTION INTRAVENOUS at 05:12

## 2023-12-15 RX ADMIN — LIDOCAINE HYDROCHLORIDE 75 MG: 20 INJECTION INTRAVENOUS at 02:12

## 2023-12-15 RX ADMIN — FENTANYL CITRATE 100 MCG: 50 INJECTION INTRAMUSCULAR; INTRAVENOUS at 02:12

## 2023-12-15 RX ADMIN — GABAPENTIN 600 MG: 300 CAPSULE ORAL at 09:12

## 2023-12-15 RX ADMIN — POLYETHYLENE GLYCOL 3350 17 G: 17 POWDER, FOR SOLUTION ORAL at 09:12

## 2023-12-15 RX ADMIN — POTASSIUM PHOSPHATE, MONOBASIC POTASSIUM PHOSPHATE, DIBASIC 15 MMOL: 224; 236 INJECTION, SOLUTION, CONCENTRATE INTRAVENOUS at 09:12

## 2023-12-15 RX ADMIN — MEROPENEM 2 G: 1 INJECTION INTRAVENOUS at 10:12

## 2023-12-15 RX ADMIN — SODIUM CHLORIDE 0.5 MCG/KG/MIN: 9 INJECTION, SOLUTION INTRAVENOUS at 02:12

## 2023-12-15 RX ADMIN — MEROPENEM 2 G: 1 INJECTION INTRAVENOUS at 05:12

## 2023-12-15 RX ADMIN — SODIUM CHLORIDE: 0.9 INJECTION, SOLUTION INTRAVENOUS at 02:12

## 2023-12-15 NOTE — PROGRESS NOTES
[facility] - Neurosurgery ([facility])  [facility] Medicine  Progress Note    Patient Name: Bj Pate Jr.  MRN: [id number]  Patient Class: IP- Inpatient   Admission Date: 12/10/2023  Length of Stay: 4 days  Attending Physician: Krystian Reed MD  Primary Care Provider: Jose Antonio Foster MD        Subjective:     Principal Problem:Severe sepsis        HPI:  This is a 73M with a h/o CVA  about 8 to 10 years ago with R sided deficits, DM, HTN who was BIB from home with family with 3 weeks of abdominal pain. Seen at multiple EDS, and generally unremarkable work up as per family. Within last few days started to have progressive worsening back pain. Became acute altered 2 days ago w/, LE weakness now requiring assistance to walk, as well as urinary incontinence. Patient is A/O 0x at admit. Accompanied by Daughter, most of history from her.     Admission to ED patient was febrile at 102 F, tachycardic 130, tachypneic at 23, hypertensive at 140/72 initially satting well on room air later requiring 2 L nasal cannula.  WBCs elevated 12.74 anemia 11.0 platelets normal, sed rate 120+, BUN 27 magnesium 1.5, albumin 2.4, .1, troponin 0.126 urine trace proteinuria trace ketones lactate initially elevated 2.6 later 1.3 CT head unremarkable for acute processes CT abdomen and pelvis and MRI lumbar spine shows left psoas abscess L2-L3 osteomyleitis, and diskitis.  Neurosurgery was consulted recommendations are pending.  Received Cefepime and Vanc    Overview/Hospital Course:  Mr. Pate was admitted for abdominal pain and worsening mental status, and was found to have a collection in his left psoas muscle, diskitis, and osteomyelitis in the L2-L3 region.  He was placed on vancomycin and meropenem for continuing decline in mental status.  Interventional Radiology took a core biopsy of infected disc, but were unable to aspirate anything from the left psoas collection. Due to his poor mental status, an NG tube and tube feedings were  ordered to assist with his nutrition status. On 12/15 he returned to his mental baseline and was alert and having conversations prior to an L1-L4 fusion and decompression with neurosurgery.     Interval History:  Patient remained afebrile overnight.  Tachycardia is greatly improved with heart rate in the mid 90s to low 100s.  His blood pressure has recovered, and he is now experiencing his baseline hypertension prior to surgery.  His lab still show a leukocytosis, and a mildly low phosphorus.  New finding on his labs show an elevation in his AST and ALT.  He denies tenderness in the right upper quadrant.  His statin was held.  His mental baseline has greatly improved and today he is responding to all questioning and commands.  Holding conversations, and is oriented and alert x3.  His current microbiology workup still shows no growth on any of the cultures her biopsies.  Intraoperative cultures were ordered.    Review of Systems   Gastrointestinal:  Negative for abdominal pain.   Musculoskeletal:  Positive for back pain.     Objective:     Vital Signs (Most Recent):  Temp: 98.1 °F (36.7 °C) (12/15/23 1048)  Pulse: 106 (12/15/23 1048)  Resp: 20 (12/15/23 1048)  BP: (!) 165/83 (12/15/23 1055)  SpO2: 98 % (12/15/23 1048) Vital Signs (24h Range):  Temp:  [97.5 °F (36.4 °C)-98.4 °F (36.9 °C)] 98.1 °F (36.7 °C)  Pulse:  [] 106  Resp:  [16-20] 20  SpO2:  [95 %-98 %] 98 %  BP: (109-172)/(61-97) 165/83     Weight: 119.7 kg (264 lb)  Body mass index is 38.99 kg/m².    Intake/Output Summary (Last 24 hours) at 12/15/2023 1445  Last data filed at 12/15/2023 0624  Gross per 24 hour   Intake 1943.15 ml   Output 2160 ml   Net -216.85 ml         Physical Exam  Vitals and nursing note reviewed. Exam conducted with a chaperone present.   Constitutional:       General: He is not in acute distress.     Appearance: He is obese. He is not ill-appearing.   HENT:      Head: Normocephalic and atraumatic.   Eyes:      Extraocular  "Movements: Extraocular movements intact.      Pupils: Pupils are equal, round, and reactive to light.   Cardiovascular:      Rate and Rhythm: Normal rate and regular rhythm.      Pulses: Normal pulses.      Heart sounds: Normal heart sounds.   Pulmonary:      Effort: Pulmonary effort is normal.      Breath sounds: Normal breath sounds.   Abdominal:      General: There is no distension.      Palpations: Abdomen is soft.      Tenderness: There is no abdominal tenderness.   Musculoskeletal:      Cervical back: Normal range of motion.      Right lower leg: Edema present.      Left lower leg: Edema present.   Skin:     General: Skin is warm and dry.   Neurological:      Motor: Weakness present.      Comments: Patient alert and oriented x3. He is responding to all commands and carrying on conversations with family members.   Able to move all four extremities to command but has severe back pain with movement of his lower limbs.             Significant Labs: All pertinent labs within the past 24 hours have been reviewed.  Blood Culture: No results for input(s): "LABBLOO" in the last 48 hours.  CBC:   Recent Labs   Lab 12/14/23  0622 12/15/23  0638   WBC 17.74* 15.58*   HGB 10.2* 9.9*   HCT 31.2* 29.1*    364     CMP:   Recent Labs   Lab 12/14/23  0622 12/15/23  0638    139   K 4.4 4.3    106   CO2 24 27   * 106   BUN 34* 42*   CREATININE 1.2 1.0   CALCIUM 9.8 9.4   PROT 6.8 6.7   ALBUMIN 1.5* 1.5*   BILITOT 1.2* 0.8   ALKPHOS 81 92   AST 61* 197*   ALT 31 68*   ANIONGAP 9 6*     Magnesium:   Recent Labs   Lab 12/14/23  0622 12/15/23  0638   MG 2.3 2.2       Significant Imaging: I have reviewed all pertinent imaging results/findings within the past 24 hours.    Assessment/Plan:      * Severe sepsis  This patient does have evidence of infective focus  My overall impression is sepsis.  Source:  Psoas Abscess, Osteomyelitis, Discitis, Possible Epidural abcess  Antibiotics given-   Antibiotics (72h ago, " onward)      Start     Stop Route Frequency Ordered    12/14/23 0900  vancomycin 1.75 g in 5 % dextrose 500 mL IVPB         -- IV Every 24 hours (non-standard times) 12/13/23 2212    12/12/23 1700  meropenem (MERREM) 2 g in sodium chloride 0.9% 100 mL IVPB         -- IV Every 8 hours (non-standard times) 12/12/23 1537    12/11/23 0752  vancomycin - pharmacy to dose  (vancomycin IVPB (PEDS and ADULTS))        See Hyperspace for full Linked Orders Report.    -- IV pharmacy to manage frequency 12/11/23 0654          Latest lactate reviewed-  Recent Labs   Lab 12/12/23  1653 12/12/23  2206 12/13/23  0313   LACTATE 1.8 1.6 1.3       Organ dysfunction indicate    Will Not start Pressors as bp is greater than 65.   Source control achieved by: abx     CTAP/MRI Lumbar spine showing L psoas collection (2.2 cm), and L2-L3 diskitis/osteomyelitis    - On Vancomycin, Meropenem  - Following biopsy of disc by IR  - Intra-operative cultures ordered   - Vital signs improving with continued antibiotic treatment  - In the OR today for L1-L4 fusion and decompression  - ID consulted and following      Hemiparesis  History of R sided hemiparesis from prior CVA. Mostly resolved per family.    PT/OT once stable after surgery      Proctitis  See stercoral colitis      Stercoral colitis  CT AP w rectal colitis vs constipation    - brown bomb on 12/14 with large bowel movement  - Aggressive Bowel regimen after surgery      Back pain  Likely secondary to L2-L3 diskitis/ostemyelitis.    MRI spine completed  NSGY consulted. Surgery today for L1-L4 fusion and decompression  Pain meds PRN      Psoas muscle abscess  See severe sepsis      Malnutrition  Maintenance IVFs  NG tube placement ordered  RDN consulted, appreciate recs  Tube feeds held for surgery today      Encephalopathy acute  Likely secondary to sepsis. Patient with history of prior CVA.     Continue antiobiotics and attempting to achieve source control with IR/NSGY   Cefepime  discontinued   Neurology and vascular neurology consulted  MRI brain reviewed by vascular neurology  Trend CMP      Acute on chronic systolic heart failure  Results for orders placed during the hospital encounter of 12/10/23    Echo    Interpretation Summary    Technically difficult portable study    Left Ventricle: The left ventricle is normal in size. There is concentric remodeling. Mild global hypokinesis present. There is mildly reduced systolic function with a visually estimated ejection fraction of 45 - 50%.    Right Ventricle: Normal right ventricular cavity size. Wall thickness is normal. Right ventricle wall motion  is normal. Systolic function is normal.    Pulmonary Artery: The estimated pulmonary artery systolic pressure is at least 24 mmHg.    The IVC was not well visualized.      Osteomyelitis  See severe sepsis        Discitis  See severe sepsis      AMS (altered mental status)  likely 2/2 sepsis  Worsening, no new insults on MRI brain per Stroke  Improved 12/15 prior to surgery with patient back to baseline    Neurology consulted, low suspicion for seizures, possibly gout flare per their assessment but this seems unlikely given his presentation and MRI/CT findings.     Acute osteomyelitis of lumbar spine  See severe sepsis    Urinary retention  Rivera placed by ED, PVR now that he has external condom catheter. Due to diskitis/osteomyelitis, concern for possible spinal cord compression.    Bladder scan as needed  Document PVRs        History of CVA (cerebrovascular accident)  No acute processes on head CT or brain MRI  Repeat MRI reviewed by Vascular Neuro, likely artifact.     Morbid obesity with BMI of 45.0-49.9, adult  Body mass index is 39.13 kg/m². Morbid obesity complicates all aspects of disease management from diagnostic modalities to treatment. Weight loss encouraged and health benefits explained to patient.           VTE Risk Mitigation (From admission, onward)           Ordered     IP VTE  HIGH RISK PATIENT  Once         12/11/23 0652     Place sequential compression device  Until discontinued         12/11/23 0652                    Discharge Planning   GLORIA: 12/18/2023     Code Status: Full Code   Is the patient medically ready for discharge?: No    Reason for patient still in hospital (select all that apply): Treatment  Discharge Plan A: Skilled Nursing Facility                  Dick Meza MD  Department of Hospital Medicine   [facility] - Neurosurgery ([facility])

## 2023-12-15 NOTE — PLAN OF CARE
Pt unable to be seen, in OR. See plan of care below:    74 yo male who presents with AMS change, reported fall, and history of back pain who was found to have cf L2-3 discitis, possible L3-4 osteo, as well as L psoas abscess. Has fevered to 102.3, but remains afebrile since 12/13 afternoon. White count stable, 15k today. VSS.     Blood cultures negative. IR disc space aspiration no grown. Gram stain negative, though was on broad spectrum antibiotics prior. In OR with NSGY for lumbar decompression.      Continues on meropenem, vancomycin.       Recommendations:  - continue vanc. Inpatient pharm to dose. Trough goal 15-20  - continue meropenem 2g IV q8hr.   - please send culture data while in the OR today.   - will follow culture data and tailor antibiotics as needed   - plan reviewed with ID staff. ID will follow.

## 2023-12-15 NOTE — PT/OT/SLP PROGRESS
Speech Language Pathology      Genekacie Herlinda Wilkinson.  MRN: [id number]  959/959 A      Patient not seen today secondary to patient NPO for planned L1-L4 decompression and fusion. ST will continue to follow as appropriate.

## 2023-12-15 NOTE — ASSESSMENT & PLAN NOTE
likely 2/2 sepsis  Worsening, no new insults on MRI brain per Stroke  Improved 12/15 prior to surgery with patient back to baseline    Neurology consulted, low suspicion for seizures, possibly gout flare per their assessment but this seems unlikely given his presentation and MRI/CT findings.

## 2023-12-15 NOTE — SUBJECTIVE & OBJECTIVE
Interval History: AAOx4. AFVSS. Weakness noted throughout. Repeat MRI obtained, no overt canal stenosis that would account for his profound weakness. Will proceed to the OR as planned for L1-4 fusion.     Medications:  Continuous Infusions:  Scheduled Meds:   diazePAM  5 mg Intravenous Once    fluticasone furoate-vilanteroL  1 puff Inhalation Daily    gabapentin  600 mg Per NG tube TID    meropenem (MERREM) IVPB  2 g Intravenous Q8H    polyethylene glycol  17 g Per NG tube BID    senna-docusate 8.6-50 mg  1 tablet Per NG tube Daily    vancomycin (VANCOCIN) IV (PEDS and ADULTS)  1,750 mg Intravenous Q24H     PRN Meds:acetaminophen, aluminum-magnesium hydroxide-simethicone, dextrose 10%, dextrose 10%, diphenhydrAMINE, fentaNYL, glucagon (human recombinant), glucose, glucose, HYDROmorphone, insulin aspart U-100, levalbuterol, melatonin, morphine, naloxone, ondansetron, sodium chloride 0.9%, sodium chloride 0.9%, Pharmacy to dose Vancomycin consult **AND** vancomycin - pharmacy to dose     Review of Systems  Objective:     Weight: 119.7 kg (264 lb)  Body mass index is 38.99 kg/m².  Vital Signs (Most Recent):  Temp: 98.1 °F (36.7 °C) (12/15/23 1048)  Pulse: 106 (12/15/23 1048)  Resp: 20 (12/15/23 1048)  BP: (!) 165/83 (12/15/23 1055)  SpO2: 98 % (12/15/23 1048) Vital Signs (24h Range):  Temp:  [97.5 °F (36.4 °C)-98.4 °F (36.9 °C)] 98.1 °F (36.7 °C)  Pulse:  [] 106  Resp:  [16-20] 20  SpO2:  [95 %-98 %] 98 %  BP: (109-172)/(61-97) 165/83                              NG/OG Tube 12/14/23 0800 nasogastric Left nostril (Active)   Placement Check placement verified by aspirate characteristics 12/14/23 2026   Tolerance no signs/symptoms of discomfort 12/15/23 1055   Securement secured to nostril center 12/15/23 1055   Clamp Status/Tolerance clamped;no abdominal discomfort;no abdominal distention;no emesis;no nausea;no restlessness 12/15/23 1055   Insertion Site Appearance no redness, warmth, tenderness, skin breakdown,  drainage 12/15/23 1055   Feeding Type continuous 12/14/23 1226   Feeding Action feeding continued 12/14/23 1226   Current Rate (mL/hr) 10 mL/hr 12/14/23 1226   Intake (mL) 50 mL 12/15/23 0006   Water Bolus (mL) 250 mL 12/14/23 2026   Tube Output(mL)(Include Discarded Residual) 0 mL 12/15/23 0006   Intake (mL) - Formula Tube Feeding 15 12/14/23 2026       Male External Urinary Catheter 12/12/23 1755 Small (Active)   Collection Container Standard drainage bag 12/15/23 1055   Skin no redness;no breakdown 12/15/23 1055   Tolerance no signs/symptoms of discomfort 12/15/23 1055   Output (mL) 175 mL 12/13/23 0455   Catheter Change Date 12/13/23 12/13/23 0800   Catheter Change Time 2300 12/12/23 2300          Physical Exam         Neurosurgery Physical Exam    General: well developed, well nourished, no distress.   Head: normocephalic, atraumatic  Neurologic: Alert and oriented. Thought content appropriate.  GCS: Motor: 6/Verbal: 5/Eyes: 4 GCS Total: 15  Mental Status: Awake, Alert, Oriented x 4  Language: No aphasia  Speech: dysarthric   Cranial nerves: face symmetric, tongue midline, CN II-XII grossly intact, EOMI  Pulmonary: normal respirations, no signs of respiratory distress  Sensory: intact to light touch throughout  Motor Strength: Moves all extremities spontaneously with good tone. BUE 2/5, BLE 2/5 except DF/PF/EHL 3/5.    Diffuse edema.   Skin: Skin is warm, dry and intact.        Significant Labs:  Recent Labs   Lab 12/14/23  0622 12/15/23  0638   * 106    139   K 4.4 4.3    106   CO2 24 27   BUN 34* 42*   CREATININE 1.2 1.0   CALCIUM 9.8 9.4   MG 2.3 2.2     Recent Labs   Lab 12/14/23  0622 12/15/23  0638   WBC 17.74* 15.58*   HGB 10.2* 9.9*   HCT 31.2* 29.1*    364     Recent Labs   Lab 12/14/23  1809   INR 1.0   APTT 25.9     Microbiology Results (last 7 days)       Procedure Component Value Units Date/Time    Culture, Anaerobic [[id number]] Collected: 12/13/23 1703    Order Status:  Completed Specimen: Abscess from Buttocks, Left Updated: 12/15/23 0707     Anaerobic Culture Culture in progress    Blood culture [[id number]] Collected: 12/12/23 0033    Order Status: Completed Specimen: Blood Updated: 12/15/23 0612     Blood Culture, Routine No Growth to date      No Growth to date      No Growth to date      No Growth to date    Blood culture [[id number]] Collected: 12/12/23 0033    Order Status: Completed Specimen: Blood Updated: 12/15/23 0612     Blood Culture, Routine No Growth to date      No Growth to date      No Growth to date      No Growth to date    Blood culture x two cultures. Draw prior to antibiotics. [[id number]] Collected: 12/10/23 2007    Order Status: Completed Specimen: Blood from Peripheral, Antecubital, Left Updated: 12/14/23 2212     Blood Culture, Routine No Growth to date      No Growth to date      No Growth to date      No Growth to date      No Growth to date    Narrative:      Aerobic and anaerobic    Blood culture x two cultures. Draw prior to antibiotics. [[id number]] Collected: 12/10/23 2007    Order Status: Completed Specimen: Blood from Peripheral, Hand, Left Updated: 12/14/23 2212     Blood Culture, Routine No Growth to date      No Growth to date      No Growth to date      No Growth to date      No Growth to date    Narrative:      Aerobic and anaerobic    AFB Culture & Smear [[id number]] Collected: 12/13/23 1703    Order Status: Completed Specimen: Abscess from Buttocks, Left Updated: 12/14/23 2128     AFB Culture & Smear Culture in progress     AFB CULTURE STAIN No acid fast bacilli seen.    Aerobic culture [[id number]] Collected: 12/13/23 1703    Order Status: Completed Specimen: Abscess from Buttocks, Left Updated: 12/14/23 0727     Aerobic Bacterial Culture No growth    Gram stain [[id number]] Collected: 12/13/23 1703    Order Status: Completed Specimen: Abscess from Buttocks, Left Updated: 12/13/23 2104     Gram Stain Result No WBC's      No organisms  seen    Fungus culture [[id number]] Collected: 12/13/23 1704    Order Status: Sent Specimen: Abscess from Buttocks, Left Updated: 12/13/23 1928          Recent Lab Results  (Last 5 results in the past 24 hours)        12/15/23  1046   12/15/23  0922   12/15/23  0638   12/15/23  0552   12/15/23  0017        Albumin     1.5           ALP     92           ALT     68           Anion Gap     6           AST     197           Baso #     0.03           Basophil %     0.2           BILIRUBIN TOTAL     0.8  Comment: For infants and newborns, interpretation of results should be based  on gestational age, weight and in agreement with clinical  observations.    Premature Infant recommended reference ranges:  Up to 24 hours.............<8.0 mg/dL  Up to 48 hours............<12.0 mg/dL  3-5 days..................<15.0 mg/dL  6-29 days.................<15.0 mg/dL             BUN     42           Calcium     9.4           Chloride     106           CO2     27           Creatinine     1.0           Differential Method     Automated           eGFR     >60.0           Eos #     0.0           Eosinophil %     0.2           Glucose     106           Gran # (ANC)     12.6           Gran %     81.1           Group & Rh   B POS             Hematocrit     29.1           Hemoglobin     9.9           Immature Grans (Abs)     0.28  Comment: Mild elevation in immature granulocytes is non specific and   can be seen in a variety of conditions including stress response,   acute inflammation, trauma and pregnancy. Correlation with other   laboratory and clinical findings is essential.             Immature Granulocytes     1.8           INDIRECT FOSTER   NEG             Lymph #     1.1           Lymph %     7.0           Magnesium      2.2           MCH     30.0           MCHC     34.0           MCV     88           Mono #     1.5           Mono %     9.7           MPV     10.3           nRBC     0           Phosphorus Level     2.6            Platelet Count     364           POCT Glucose 105       119   137       Potassium     4.3           PROTEIN TOTAL     6.7           RBC     3.30           RDW     14.4           Sodium     139           Specimen Outdate   12/18/2023 23:59             WBC     15.58                                All pertinent labs from the last 24 hours have been reviewed.    Significant Diagnostics:  I have reviewed all pertinent imaging results/findings within the past 24 hours.

## 2023-12-15 NOTE — ASSESSMENT & PLAN NOTE
This patient does have evidence of infective focus  My overall impression is sepsis.  Source:  Psoas Abscess, Osteomyelitis, Discitis, Possible Epidural abcess  Antibiotics given-   Antibiotics (72h ago, onward)      Start     Stop Route Frequency Ordered    12/14/23 0900  vancomycin 1.75 g in 5 % dextrose 500 mL IVPB         -- IV Every 24 hours (non-standard times) 12/13/23 2212    12/12/23 1700  meropenem (MERREM) 2 g in sodium chloride 0.9% 100 mL IVPB         -- IV Every 8 hours (non-standard times) 12/12/23 1537    12/11/23 0752  vancomycin - pharmacy to dose  (vancomycin IVPB (PEDS and ADULTS))        See Hyperspace for full Linked Orders Report.    -- IV pharmacy to manage frequency 12/11/23 0654          Latest lactate reviewed-  Recent Labs   Lab 12/12/23  1653 12/12/23  2206 12/13/23  0313   LACTATE 1.8 1.6 1.3       Organ dysfunction indicate    Will Not start Pressors as bp is greater than 65.   Source control achieved by: abx     CTAP/MRI Lumbar spine showing L psoas collection (2.2 cm), and L2-L3 diskitis/osteomyelitis    - On Vancomycin, Meropenem  - Following biopsy of disc by IR  - Intra-operative cultures ordered   - Vital signs improving with continued antibiotic treatment  - In the OR today for L1-L4 fusion and decompression  - ID consulted and following

## 2023-12-15 NOTE — ASSESSMENT & PLAN NOTE
Likely secondary to L2-L3 diskitis/ostemyelitis.    MRI spine completed  NSGY consulted. Surgery today for L1-L4 fusion and decompression  Pain meds PRN

## 2023-12-15 NOTE — ANESTHESIA PROCEDURE NOTES
Intubation    Date/Time: 12/15/2023 2:51 PM    Performed by: Kiesha Tidwell CRNA  Authorized by: Eric Reis MD    Intubation:     Induction:  Intravenous    Intubated:  Postinduction    Mask Ventilation:  Not attempted    Attempts:  1    Attempted By:  Staff anesthesiologist    Method of Intubation:  Video laryngoscopy    Blade:  Mcgarry 3    Laryngeal View Grade: Grade I - full view of cords      Difficult Airway Encountered?: No      Complications:  None    Airway Device:  Oral endotracheal tube    Airway Device Size:  7.5    Style/Cuff Inflation:  Cuffed    Inflation Amount (mL):  8    Tube secured:  24    Secured at:  The lips    Placement Verified By:  Capnometry    Complicating Factors:  Short neck, obesity and large/floppy epiglottis    Findings Post-Intubation:  BS equal bilateral

## 2023-12-15 NOTE — ASSESSMENT & PLAN NOTE
Maintenance IVFs  NG tube placement ordered  RDN consulted, appreciate recs  Tube feeds held for surgery today

## 2023-12-15 NOTE — ASSESSMENT & PLAN NOTE
History of R sided hemiparesis from prior CVA. Mostly resolved per family.    PT/OT once stable after surgery

## 2023-12-15 NOTE — PLAN OF CARE
Problem: Infection  Goal: Absence of Infection Signs and Symptoms  Outcome: Ongoing, Progressing     Problem: Adult Inpatient Plan of Care  Goal: Plan of Care Review  Outcome: Ongoing, Progressing  Goal: Patient-Specific Goal (Individualized)  Outcome: Ongoing, Progressing  Goal: Absence of Hospital-Acquired Illness or Injury  Outcome: Ongoing, Progressing  Goal: Optimal Comfort and Wellbeing  Outcome: Ongoing, Progressing  Goal: Readiness for Transition of Care  Outcome: Ongoing, Progressing     Problem: Bariatric Environmental Safety  Goal: Safety Maintained with Care  Outcome: Ongoing, Progressing     Problem: Adjustment to Illness (Sepsis/Septic Shock)  Goal: Optimal Coping  Outcome: Ongoing, Progressing     Problem: Bleeding (Sepsis/Septic Shock)  Goal: Absence of Bleeding  Outcome: Ongoing, Progressing     Problem: Glycemic Control Impaired (Sepsis/Septic Shock)  Goal: Blood Glucose Level Within Desired Range  Outcome: Ongoing, Progressing     Problem: Infection Progression (Sepsis/Septic Shock)  Goal: Absence of Infection Signs and Symptoms  Outcome: Ongoing, Progressing     Problem: Nutrition Impaired (Sepsis/Septic Shock)  Goal: Optimal Nutrition Intake  Outcome: Ongoing, Progressing     Problem: Fluid and Electrolyte Imbalance (Acute Kidney Injury/Impairment)  Goal: Fluid and Electrolyte Balance  Outcome: Ongoing, Progressing     Problem: Oral Intake Inadequate (Acute Kidney Injury/Impairment)  Goal: Optimal Nutrition Intake  Outcome: Ongoing, Progressing     Problem: Renal Function Impairment (Acute Kidney Injury/Impairment)  Goal: Effective Renal Function  Outcome: Ongoing, Progressing     Problem: Impaired Wound Healing  Goal: Optimal Wound Healing  Outcome: Ongoing, Progressing     Problem: Skin Injury Risk Increased  Goal: Skin Health and Integrity  Outcome: Ongoing, Progressing     Problem: Fall Injury Risk  Goal: Absence of Fall and Fall-Related Injury  Outcome: Ongoing, Progressing     Problem:  Restraint, Nonbehavioral (Nonviolent)  Goal: Absence of Harm or Injury  Outcome: Ongoing, Progressing

## 2023-12-15 NOTE — PROGRESS NOTES
Connor Mina - Neurosurgery ([facility])  Neurosurgery  Progress Note    Subjective:     History of Present Illness: Mr. Pate is a 73M w/ hx CVA, CKD3, HTN, DM2, COPD who presents with increasing lethargy and confusion. Per ED team, patient had 3 weeks of low back pain, with a fall yesterday leading to his presentation. He had subjective lower extremity weakness and urinary retention after the fall. Septic on presentation to ED. Too altered to meaningfully participate in spine motor exam at time of NSGY evaluation.     Post-Op Info:  Procedure(s) (LRB):  **LOOP X**FUSION, SPINE, LUMBAR, TLIF, POSTERIOR APPROACH, USING PEDICLE SCREW (N/A)   Day of Surgery   Interval History: AAOx4. AFVSS. Weakness noted throughout. Repeat MRI obtained, no overt canal stenosis that would account for his profound weakness. Will proceed to the OR as planned for L1-4 fusion.     Medications:  Continuous Infusions:  Scheduled Meds:   diazePAM  5 mg Intravenous Once    fluticasone furoate-vilanteroL  1 puff Inhalation Daily    gabapentin  600 mg Per NG tube TID    meropenem (MERREM) IVPB  2 g Intravenous Q8H    polyethylene glycol  17 g Per NG tube BID    senna-docusate 8.6-50 mg  1 tablet Per NG tube Daily    vancomycin (VANCOCIN) IV (PEDS and ADULTS)  1,750 mg Intravenous Q24H     PRN Meds:acetaminophen, aluminum-magnesium hydroxide-simethicone, dextrose 10%, dextrose 10%, diphenhydrAMINE, fentaNYL, glucagon (human recombinant), glucose, glucose, HYDROmorphone, insulin aspart U-100, levalbuterol, melatonin, morphine, naloxone, ondansetron, sodium chloride 0.9%, sodium chloride 0.9%, Pharmacy to dose Vancomycin consult **AND** vancomycin - pharmacy to dose     Review of Systems  Objective:     Weight: 119.7 kg (264 lb)  Body mass index is 38.99 kg/m².  Vital Signs (Most Recent):  Temp: 98.1 °F (36.7 °C) (12/15/23 1048)  Pulse: 106 (12/15/23 1048)  Resp: 20 (12/15/23 1048)  BP: (!) 165/83 (12/15/23 1055)  SpO2: 98 % (12/15/23 1048) Vital Signs  (24h Range):  Temp:  [97.5 °F (36.4 °C)-98.4 °F (36.9 °C)] 98.1 °F (36.7 °C)  Pulse:  [] 106  Resp:  [16-20] 20  SpO2:  [95 %-98 %] 98 %  BP: (109-172)/(61-97) 165/83                              NG/OG Tube 12/14/23 0800 nasogastric Left nostril (Active)   Placement Check placement verified by aspirate characteristics 12/14/23 2026   Tolerance no signs/symptoms of discomfort 12/15/23 1055   Securement secured to nostril center 12/15/23 1055   Clamp Status/Tolerance clamped;no abdominal discomfort;no abdominal distention;no emesis;no nausea;no restlessness 12/15/23 1055   Insertion Site Appearance no redness, warmth, tenderness, skin breakdown, drainage 12/15/23 1055   Feeding Type continuous 12/14/23 1226   Feeding Action feeding continued 12/14/23 1226   Current Rate (mL/hr) 10 mL/hr 12/14/23 1226   Intake (mL) 50 mL 12/15/23 0006   Water Bolus (mL) 250 mL 12/14/23 2026   Tube Output(mL)(Include Discarded Residual) 0 mL 12/15/23 0006   Intake (mL) - Formula Tube Feeding 15 12/14/23 2026       Male External Urinary Catheter 12/12/23 1755 Small (Active)   Collection Container Standard drainage bag 12/15/23 1055   Skin no redness;no breakdown 12/15/23 1055   Tolerance no signs/symptoms of discomfort 12/15/23 1055   Output (mL) 175 mL 12/13/23 0455   Catheter Change Date 12/13/23 12/13/23 0800   Catheter Change Time 2300 12/12/23 2300          Physical Exam         Neurosurgery Physical Exam    General: well developed, well nourished, no distress.   Head: normocephalic, atraumatic  Neurologic: Alert and oriented. Thought content appropriate.  GCS: Motor: 6/Verbal: 5/Eyes: 4 GCS Total: 15  Mental Status: Awake, Alert, Oriented x 4  Language: No aphasia  Speech: dysarthric   Cranial nerves: face symmetric, tongue midline, CN II-XII grossly intact, EOMI  Pulmonary: normal respirations, no signs of respiratory distress  Sensory: intact to light touch throughout  Motor Strength: Moves all extremities spontaneously  with good tone. BUE 2/5, BLE 2/5 except DF/PF/EHL 3/5.    Diffuse edema.   Skin: Skin is warm, dry and intact.        Significant Labs:  Recent Labs   Lab 12/14/23  0622 12/15/23  0638   * 106    139   K 4.4 4.3    106   CO2 24 27   BUN 34* 42*   CREATININE 1.2 1.0   CALCIUM 9.8 9.4   MG 2.3 2.2     Recent Labs   Lab 12/14/23  0622 12/15/23  0638   WBC 17.74* 15.58*   HGB 10.2* 9.9*   HCT 31.2* 29.1*    364     Recent Labs   Lab 12/14/23  1809   INR 1.0   APTT 25.9     Microbiology Results (last 7 days)       Procedure Component Value Units Date/Time    Culture, Anaerobic [[id number]] Collected: 12/13/23 1703    Order Status: Completed Specimen: Abscess from Buttocks, Left Updated: 12/15/23 0707     Anaerobic Culture Culture in progress    Blood culture [[id number]] Collected: 12/12/23 0033    Order Status: Completed Specimen: Blood Updated: 12/15/23 0612     Blood Culture, Routine No Growth to date      No Growth to date      No Growth to date      No Growth to date    Blood culture [[id number]] Collected: 12/12/23 0033    Order Status: Completed Specimen: Blood Updated: 12/15/23 0612     Blood Culture, Routine No Growth to date      No Growth to date      No Growth to date      No Growth to date    Blood culture x two cultures. Draw prior to antibiotics. [[id number]] Collected: 12/10/23 2007    Order Status: Completed Specimen: Blood from Peripheral, Antecubital, Left Updated: 12/14/23 2212     Blood Culture, Routine No Growth to date      No Growth to date      No Growth to date      No Growth to date      No Growth to date    Narrative:      Aerobic and anaerobic    Blood culture x two cultures. Draw prior to antibiotics. [[id number]] Collected: 12/10/23 2007    Order Status: Completed Specimen: Blood from Peripheral, Hand, Left Updated: 12/14/23 2212     Blood Culture, Routine No Growth to date      No Growth to date      No Growth to date      No Growth to date      No Growth to date     Narrative:      Aerobic and anaerobic    AFB Culture & Smear [[id number]] Collected: 12/13/23 1703    Order Status: Completed Specimen: Abscess from Buttocks, Left Updated: 12/14/23 2128     AFB Culture & Smear Culture in progress     AFB CULTURE STAIN No acid fast bacilli seen.    Aerobic culture [[id number]] Collected: 12/13/23 1703    Order Status: Completed Specimen: Abscess from Buttocks, Left Updated: 12/14/23 0727     Aerobic Bacterial Culture No growth    Gram stain [[id number]] Collected: 12/13/23 1703    Order Status: Completed Specimen: Abscess from Buttocks, Left Updated: 12/13/23 2104     Gram Stain Result No WBC's      No organisms seen    Fungus culture [[id number]] Collected: 12/13/23 1703    Order Status: Sent Specimen: Abscess from Buttocks, Left Updated: 12/13/23 1928          Recent Lab Results  (Last 5 results in the past 24 hours)        12/15/23  1046   12/15/23  0922   12/15/23  0638   12/15/23  0552   12/15/23  0017        Albumin     1.5           ALP     92           ALT     68           Anion Gap     6           AST     197           Baso #     0.03           Basophil %     0.2           BILIRUBIN TOTAL     0.8  Comment: For infants and newborns, interpretation of results should be based  on gestational age, weight and in agreement with clinical  observations.    Premature Infant recommended reference ranges:  Up to 24 hours.............<8.0 mg/dL  Up to 48 hours............<12.0 mg/dL  3-5 days..................<15.0 mg/dL  6-29 days.................<15.0 mg/dL             BUN     42           Calcium     9.4           Chloride     106           CO2     27           Creatinine     1.0           Differential Method     Automated           eGFR     >60.0           Eos #     0.0           Eosinophil %     0.2           Glucose     106           Gran # (ANC)     12.6           Gran %     81.1           Group & Rh   B POS             Hematocrit     29.1           Hemoglobin     9.9            Immature Grans (Abs)     0.28  Comment: Mild elevation in immature granulocytes is non specific and   can be seen in a variety of conditions including stress response,   acute inflammation, trauma and pregnancy. Correlation with other   laboratory and clinical findings is essential.             Immature Granulocytes     1.8           INDIRECT FOSTER   NEG             Lymph #     1.1           Lymph %     7.0           Magnesium      2.2           MCH     30.0           MCHC     34.0           MCV     88           Mono #     1.5           Mono %     9.7           MPV     10.3           nRBC     0           Phosphorus Level     2.6           Platelet Count     364           POCT Glucose 105       119   137       Potassium     4.3           PROTEIN TOTAL     6.7           RBC     3.30           RDW     14.4           Sodium     139           Specimen Outdate   12/18/2023 23:59             WBC     15.58                                All pertinent labs from the last 24 hours have been reviewed.    Significant Diagnostics:  I have reviewed all pertinent imaging results/findings within the past 24 hours.  Assessment/Plan:     Acute osteomyelitis of lumbar spine  A 73M w/ hx CVA, CKD3, HTN, DM2, and COPD who presents with AMS likely 2/2 underling sepsis as well as progressive back pain and inability to ambulate for the past 2 weeks due to progressive L2-L3 osteodiscitis and presumed mechanical instability.        CT shows lumbar spondylosis and progressive errosive changes at L2-L3   MRI with lumbar spondylosis 2/2 multilevel of DDD and L2-L3 osteodiscitis wrose at L2-L4 with moderate to severe stenosis as well as L psoas abscess.      Infection markers elevated: , CRP >120. Bcx negative thus far 12/10 and 12/12. On broad spectrum abx     Now s/p IR bx yday and attempted to aspirated psoas abscess 12/13        Planning for for L1-L4 decompression and fusion  Follow up disc bx  MRI w/wo C and T spine obtained but  suboptimal without evidence of infection other where else; repeat MRI C/T w/ contrast without overt canal stenosis to explain his weakness. Will proceed to the OR.  TTE completed, EF 45-50 %  ID consultation and continue broad spec abx   Please trend infection markers   DVT US BLE: negative   Cleared by   TF held, DVT ppx held  Further care per primary team  Update provided to patient's daughter Joseph over the phone. Agreed to plan.      Dispo: OR today     D/w Dr. Sergey Gomez PA-C  Neurosurgery  [facility] - Neurosurgery ([facility])

## 2023-12-15 NOTE — SUBJECTIVE & OBJECTIVE
Interval History:  Patient remained afebrile overnight.  Tachycardia is greatly improved with heart rate in the mid 90s to low 100s.  His blood pressure has recovered, and he is now experiencing his baseline hypertension prior to surgery.  His lab still show a leukocytosis, and a mildly low phosphorus.  New finding on his labs show an elevation in his AST and ALT.  He denies tenderness in the right upper quadrant.  His statin was held.  His mental baseline has greatly improved and today he is responding to all questioning and commands.  Holding conversations, and is oriented and alert x3.  His current microbiology workup still shows no growth on any of the cultures her biopsies.  Intraoperative cultures were ordered.    Review of Systems   Gastrointestinal:  Negative for abdominal pain.   Musculoskeletal:  Positive for back pain.     Objective:     Vital Signs (Most Recent):  Temp: 98.1 °F (36.7 °C) (12/15/23 1048)  Pulse: 106 (12/15/23 1048)  Resp: 20 (12/15/23 1048)  BP: (!) 165/83 (12/15/23 1055)  SpO2: 98 % (12/15/23 1048) Vital Signs (24h Range):  Temp:  [97.5 °F (36.4 °C)-98.4 °F (36.9 °C)] 98.1 °F (36.7 °C)  Pulse:  [] 106  Resp:  [16-20] 20  SpO2:  [95 %-98 %] 98 %  BP: (109-172)/(61-97) 165/83     Weight: 119.7 kg (264 lb)  Body mass index is 38.99 kg/m².    Intake/Output Summary (Last 24 hours) at 12/15/2023 1445  Last data filed at 12/15/2023 0624  Gross per 24 hour   Intake 1943.15 ml   Output 2160 ml   Net -216.85 ml         Physical Exam  Vitals and nursing note reviewed. Exam conducted with a chaperone present.   Constitutional:       General: He is not in acute distress.     Appearance: He is obese. He is not ill-appearing.   HENT:      Head: Normocephalic and atraumatic.   Eyes:      Extraocular Movements: Extraocular movements intact.      Pupils: Pupils are equal, round, and reactive to light.   Cardiovascular:      Rate and Rhythm: Normal rate and regular rhythm.      Pulses: Normal pulses.  "     Heart sounds: Normal heart sounds.   Pulmonary:      Effort: Pulmonary effort is normal.      Breath sounds: Normal breath sounds.   Abdominal:      General: There is no distension.      Palpations: Abdomen is soft.      Tenderness: There is no abdominal tenderness.   Musculoskeletal:      Cervical back: Normal range of motion.      Right lower leg: Edema present.      Left lower leg: Edema present.   Skin:     General: Skin is warm and dry.   Neurological:      Motor: Weakness present.      Comments: Patient alert and oriented x3. He is responding to all commands and carrying on conversations with family members.   Able to move all four extremities to command but has severe back pain with movement of his lower limbs.             Significant Labs: All pertinent labs within the past 24 hours have been reviewed.  Blood Culture: No results for input(s): "LABBLOO" in the last 48 hours.  CBC:   Recent Labs   Lab 12/14/23  0622 12/15/23  0638   WBC 17.74* 15.58*   HGB 10.2* 9.9*   HCT 31.2* 29.1*    364     CMP:   Recent Labs   Lab 12/14/23  0622 12/15/23  0638    139   K 4.4 4.3    106   CO2 24 27   * 106   BUN 34* 42*   CREATININE 1.2 1.0   CALCIUM 9.8 9.4   PROT 6.8 6.7   ALBUMIN 1.5* 1.5*   BILITOT 1.2* 0.8   ALKPHOS 81 92   AST 61* 197*   ALT 31 68*   ANIONGAP 9 6*     Magnesium:   Recent Labs   Lab 12/14/23  0622 12/15/23  0638   MG 2.3 2.2       Significant Imaging: I have reviewed all pertinent imaging results/findings within the past 24 hours.  "

## 2023-12-15 NOTE — ASSESSMENT & PLAN NOTE
CT AP w rectal colitis vs constipation    - brown bomb on 12/14 with large bowel movement  - Aggressive Bowel regimen after surgery

## 2023-12-15 NOTE — CARE UPDATE
RAPID RESPONSE NURSE ROUND       Rounding completed with charge RN, Gloria for sepsis watch reports improvements. No additional concerns verbalized at this time. Instructed to call [phone] for further concerns or assistance.

## 2023-12-15 NOTE — PLAN OF CARE
Problem: Infection  Goal: Absence of Infection Signs and Symptoms  Outcome: Ongoing, Progressing     Problem: Adult Inpatient Plan of Care  Goal: Readiness for Transition of Care  Outcome: Ongoing, Progressing     Problem: Adjustment to Illness (Sepsis/Septic Shock)  Goal: Optimal Coping  Outcome: Ongoing, Progressing     Problem: Infection Progression (Sepsis/Septic Shock)  Goal: Absence of Infection Signs and Symptoms  Outcome: Ongoing, Progressing

## 2023-12-15 NOTE — SUBJECTIVE & OBJECTIVE
Interval History:     S/p intubation yesterday for scan, remains on pressors.     Review of Systems   Unable to perform ROS: Intubated     Objective:     Vital Signs (Most Recent):  Temp: 98 °F (36.7 °C) (12/15/23 0749)  Pulse: 101 (12/15/23 0749)  Resp: 16 (12/15/23 0749)  BP: 109/61 (12/15/23 0749)  SpO2: 98 % (12/15/23 0749) Vital Signs (24h Range):  Temp:  [97.5 °F (36.4 °C)-98.9 °F (37.2 °C)] 98 °F (36.7 °C)  Pulse:  [] 101  Resp:  [16-20] 16  SpO2:  [94 %-98 %] 98 %  BP: (109-172)/(61-97) 109/61     Weight: 120.2 kg (264 lb 15.9 oz)  Body mass index is 39.13 kg/m².    Estimated Creatinine Clearance: 86.7 mL/min (based on SCr of 1 mg/dL).     Physical Exam  Constitutional:       General: He is not in acute distress.  HENT:      Head:      Comments: EEG cap     Mouth/Throat:      Comments: ETT  Pulmonary:      Effort: Pulmonary effort is normal. No respiratory distress.   Abdominal:      General: There is no distension.      Palpations: Abdomen is soft.      Tenderness: There is no abdominal tenderness.   Genitourinary:     Comments: bryant  Musculoskeletal:      Right lower leg: No edema.      Left lower leg: No edema.   Skin:     General: Skin is warm and dry.   Neurological:      Mental Status: He is disoriented.          Significant Labs:   Microbiology Results (last 7 days)       Procedure Component Value Units Date/Time    Blood culture [[id number]] Collected: 12/12/23 0033    Order Status: Completed Specimen: Blood Updated: 12/17/23 0612     Blood Culture, Routine No growth after 5 days.    Blood culture [[id number]] Collected: 12/12/23 0033    Order Status: Completed Specimen: Blood Updated: 12/17/23 0612     Blood Culture, Routine No growth after 5 days.    AFB Culture & Smear [[id number]] Collected: 12/15/23 1603    Order Status: Completed Specimen: Wound from Back Updated: 12/16/23 2127     AFB Culture & Smear Culture in progress    Narrative:      Paraspinal    AFB Culture & Smear [[id number]]  Collected: 12/15/23 1655    Order Status: Completed Specimen: Wound from Back Updated: 12/16/23 2127     AFB Culture & Smear Culture in progress    Narrative:      2.) 2,3 Disc space    AFB Culture & Smear [[id number]] Collected: 12/15/23 1657    Order Status: Completed Specimen: Wound from Back Updated: 12/16/23 2127     AFB Culture & Smear Culture in progress    Narrative:      3.) 2,3 Disc space    Aerobic culture [[id number]] Collected: 12/13/23 1703    Order Status: Completed Specimen: Abscess from Buttocks, Left Updated: 12/16/23 1109     Aerobic Bacterial Culture No growth    Aerobic culture [[id number]] Collected: 12/15/23 1603    Order Status: Completed Specimen: Wound from Back Updated: 12/16/23 0718     Aerobic Bacterial Culture No growth    Narrative:      Paraspinal    Aerobic culture [[id number]] Collected: 12/15/23 1655    Order Status: Completed Specimen: Wound from Back Updated: 12/16/23 0718     Aerobic Bacterial Culture No growth    Narrative:      2.) 2,3 Disc space    Culture, Anaerobe [[id number]] Collected: 12/15/23 1655    Order Status: Completed Specimen: Wound from Back Updated: 12/16/23 0710     Anaerobic Culture Culture in progress    Narrative:      2.) 2,3 Disc space    Culture, Anaerobe [[id number]] Collected: 12/15/23 1657    Order Status: Completed Specimen: Wound from Back Updated: 12/16/23 0710     Anaerobic Culture Culture in progress    Narrative:      3.) 2,3 Disc space    Culture, Anaerobe [[id number]] Collected: 12/15/23 1603    Order Status: Completed Specimen: Wound from Back Updated: 12/16/23 0710     Anaerobic Culture Culture in progress    Narrative:      Paraspinal    Aerobic culture [[id number]] Collected: 12/15/23 1657    Order Status: Completed Specimen: Wound from Back Updated: 12/16/23 0702     Aerobic Bacterial Culture No growth    Narrative:      3.) 2,3 Disc space    Blood culture x two cultures. Draw prior to antibiotics. [[id number]] Collected: 12/10/23 2007     Order Status: Completed Specimen: Blood from Peripheral, Antecubital, Left Updated: 12/15/23 2212     Blood Culture, Routine No growth after 5 days.    Narrative:      Aerobic and anaerobic    Blood culture x two cultures. Draw prior to antibiotics. [[id number]] Collected: 12/10/23 2007    Order Status: Completed Specimen: Blood from Peripheral, Hand, Left Updated: 12/15/23 2212     Blood Culture, Routine No growth after 5 days.    Narrative:      Aerobic and anaerobic    Gram stain [[id number]] Collected: 12/15/23 1657    Order Status: Completed Specimen: Wound from Back Updated: 12/15/23 1900     Gram Stain Result No WBC's      No organisms seen    Narrative:      3.) 2,3 Disc space    Gram stain [[id number]] Collected: 12/15/23 1655    Order Status: Completed Specimen: Wound from Back Updated: 12/15/23 1857     Gram Stain Result No WBC's      No organisms seen    Narrative:      2.) 2,3 Disc space    Gram stain [[id number]] Collected: 12/15/23 1603    Order Status: Completed Specimen: Wound from Back Updated: 12/15/23 1855     Gram Stain Result No WBC's      No organisms seen    Narrative:      Paraspinal    Fungus culture [[id number]] Collected: 12/15/23 1655    Order Status: Sent Specimen: Wound from Back Updated: 12/15/23 1730    Fungus culture [[id number]] Collected: 12/15/23 1657    Order Status: Sent Specimen: Wound from Back Updated: 12/15/23 1727    Fungus culture [[id number]] Collected: 12/15/23 1603    Order Status: Sent Specimen: Wound from Back Updated: 12/15/23 1621    Culture, Anaerobe [[id number]]     Order Status: No result Specimen: Bone from Back     Aerobic culture [[id number]]     Order Status: No result Specimen: Bone from Back     AFB Culture & Smear [[id number]]     Order Status: No result Specimen: Bone from Back     Gram stain [[id number]]     Order Status: No result Specimen: Bone from Back     Fungus culture [[id number]]     Order Status: No result Specimen: Bone from Back     Culture,  Anaerobic [[id number]] Collected: 12/13/23 1703    Order Status: Completed Specimen: Abscess from Buttocks, Left Updated: 12/15/23 0707     Anaerobic Culture Culture in progress    AFB Culture & Smear [[id number]] Collected: 12/13/23 1703    Order Status: Completed Specimen: Abscess from Buttocks, Left Updated: 12/14/23 2128     AFB Culture & Smear Culture in progress     AFB CULTURE STAIN No acid fast bacilli seen.    Gram stain [[id number]] Collected: 12/13/23 1703    Order Status: Completed Specimen: Abscess from Buttocks, Left Updated: 12/13/23 2104     Gram Stain Result No WBC's      No organisms seen    Fungus culture [[id number]] Collected: 12/13/23 1703    Order Status: Sent Specimen: Abscess from Buttocks, Left Updated: 12/13/23 1928          Pathology Results  (Last 10 years)                 07/28/23 1133  Specimen to Pathology, Surgery Gastrointestinal tract Final result    Narrative:  Pre-op Diagnosis: Diarrhea, unspecified type [R19.7]   Abnormal finding on GI tract imaging [R93.3]   Procedure(s):   EGD (ESOPHAGOGASTRODUODENOSCOPY)   COLONOSCOPY   Jar #1: Transverse colon polyp x1   Which provider would you like to cc?->HELEN AWAD   Release to patient->Immediate   Specimen total (fresh, frozen, permanent):->1               Significant Imaging: I have reviewed all pertinent imaging results/findings within the past 24 hours.

## 2023-12-16 LAB
ALBUMIN SERPL BCP-MCNC: 1.4 G/DL (ref 3.5–5.2)
ALP SERPL-CCNC: 90 U/L (ref 55–135)
ALT SERPL W/O P-5'-P-CCNC: 51 U/L (ref 10–44)
ANION GAP SERPL CALC-SCNC: 6 MMOL/L (ref 8–16)
AST SERPL-CCNC: 94 U/L (ref 10–40)
BACTERIA SPEC AEROBE CULT: NO GROWTH
BASOPHILS # BLD AUTO: 0.03 K/UL (ref 0–0.2)
BASOPHILS NFR BLD: 0.1 % (ref 0–1.9)
BILIRUB SERPL-MCNC: 0.7 MG/DL (ref 0.1–1)
BUN SERPL-MCNC: 44 MG/DL (ref 8–23)
CALCIUM SERPL-MCNC: 9.2 MG/DL (ref 8.7–10.5)
CHLORIDE SERPL-SCNC: 107 MMOL/L (ref 95–110)
CO2 SERPL-SCNC: 24 MMOL/L (ref 23–29)
CREAT SERPL-MCNC: 1.1 MG/DL (ref 0.5–1.4)
DIFFERENTIAL METHOD BLD: ABNORMAL
EOSINOPHIL # BLD AUTO: 0 K/UL (ref 0–0.5)
EOSINOPHIL NFR BLD: 0.1 % (ref 0–8)
ERYTHROCYTE [DISTWIDTH] IN BLOOD BY AUTOMATED COUNT: 14.5 % (ref 11.5–14.5)
EST. GFR  (NO RACE VARIABLE): >60 ML/MIN/1.73 M^2
GLUCOSE SERPL-MCNC: 188 MG/DL (ref 70–110)
HCT VFR BLD AUTO: 26.6 % (ref 40–54)
HGB BLD-MCNC: 8.8 G/DL (ref 14–18)
IMM GRANULOCYTES # BLD AUTO: 0.68 K/UL (ref 0–0.04)
IMM GRANULOCYTES NFR BLD AUTO: 3.4 % (ref 0–0.5)
LYMPHOCYTES # BLD AUTO: 0.5 K/UL (ref 1–4.8)
LYMPHOCYTES NFR BLD: 2.6 % (ref 18–48)
MAGNESIUM SERPL-MCNC: 2.1 MG/DL (ref 1.6–2.6)
MCH RBC QN AUTO: 29.9 PG (ref 27–31)
MCHC RBC AUTO-ENTMCNC: 33.1 G/DL (ref 32–36)
MCV RBC AUTO: 91 FL (ref 82–98)
MONOCYTES # BLD AUTO: 1.2 K/UL (ref 0.3–1)
MONOCYTES NFR BLD: 6 % (ref 4–15)
NEUTROPHILS # BLD AUTO: 17.6 K/UL (ref 1.8–7.7)
NEUTROPHILS NFR BLD: 87.8 % (ref 38–73)
NRBC BLD-RTO: 0 /100 WBC
PHOSPHATE SERPL-MCNC: 3.3 MG/DL (ref 2.7–4.5)
PLATELET # BLD AUTO: 332 K/UL (ref 150–450)
PMV BLD AUTO: 9.8 FL (ref 9.2–12.9)
POCT GLUCOSE: 137 MG/DL (ref 70–110)
POCT GLUCOSE: 140 MG/DL (ref 70–110)
POCT GLUCOSE: 146 MG/DL (ref 70–110)
POCT GLUCOSE: 153 MG/DL (ref 70–110)
POCT GLUCOSE: 165 MG/DL (ref 70–110)
POCT GLUCOSE: 175 MG/DL (ref 70–110)
POTASSIUM SERPL-SCNC: 4.6 MMOL/L (ref 3.5–5.1)
PROT SERPL-MCNC: 6.3 G/DL (ref 6–8.4)
RBC # BLD AUTO: 2.94 M/UL (ref 4.6–6.2)
SODIUM SERPL-SCNC: 137 MMOL/L (ref 136–145)
WBC # BLD AUTO: 20.06 K/UL (ref 3.9–12.7)

## 2023-12-16 PROCEDURE — 27000221 HC OXYGEN, UP TO 24 HOURS

## 2023-12-16 PROCEDURE — 25000003 PHARM REV CODE 250

## 2023-12-16 PROCEDURE — 25000003 PHARM REV CODE 250: Performed by: STUDENT IN AN ORGANIZED HEALTH CARE EDUCATION/TRAINING PROGRAM

## 2023-12-16 PROCEDURE — 95720 EEG PHY/QHP EA INCR W/VEEG: CPT | Mod: ,,, | Performed by: STUDENT IN AN ORGANIZED HEALTH CARE EDUCATION/TRAINING PROGRAM

## 2023-12-16 PROCEDURE — 0BH17EZ INSERTION OF ENDOTRACHEAL AIRWAY INTO TRACHEA, VIA NATURAL OR ARTIFICIAL OPENING: ICD-10-PCS | Performed by: PSYCHIATRY & NEUROLOGY

## 2023-12-16 PROCEDURE — 94761 N-INVAS EAR/PLS OXIMETRY MLT: CPT

## 2023-12-16 PROCEDURE — 63600175 PHARM REV CODE 636 W HCPCS: Performed by: ANESTHESIOLOGY

## 2023-12-16 PROCEDURE — 25000003 PHARM REV CODE 250: Performed by: NEUROLOGICAL SURGERY

## 2023-12-16 PROCEDURE — 94002 VENT MGMT INPAT INIT DAY: CPT

## 2023-12-16 PROCEDURE — 5A1945Z RESPIRATORY VENTILATION, 24-96 CONSECUTIVE HOURS: ICD-10-PCS | Performed by: PSYCHIATRY & NEUROLOGY

## 2023-12-16 PROCEDURE — 84100 ASSAY OF PHOSPHORUS: CPT

## 2023-12-16 PROCEDURE — 80053 COMPREHEN METABOLIC PANEL: CPT

## 2023-12-16 PROCEDURE — 63600175 PHARM REV CODE 636 W HCPCS

## 2023-12-16 PROCEDURE — 27100171 HC OXYGEN HIGH FLOW UP TO 24 HOURS

## 2023-12-16 PROCEDURE — 63600175 PHARM REV CODE 636 W HCPCS: Performed by: STUDENT IN AN ORGANIZED HEALTH CARE EDUCATION/TRAINING PROGRAM

## 2023-12-16 PROCEDURE — 99291 CRITICAL CARE FIRST HOUR: CPT | Mod: GC,,, | Performed by: PSYCHIATRY & NEUROLOGY

## 2023-12-16 PROCEDURE — 99900035 HC TECH TIME PER 15 MIN (STAT)

## 2023-12-16 PROCEDURE — 83735 ASSAY OF MAGNESIUM: CPT

## 2023-12-16 PROCEDURE — 99900026 HC AIRWAY MAINTENANCE (STAT)

## 2023-12-16 PROCEDURE — 25000003 PHARM REV CODE 250: Performed by: REGISTERED NURSE

## 2023-12-16 PROCEDURE — 85025 COMPLETE CBC W/AUTO DIFF WBC: CPT

## 2023-12-16 PROCEDURE — 11000001 HC ACUTE MED/SURG PRIVATE ROOM

## 2023-12-16 PROCEDURE — 27200966 HC CLOSED SUCTION SYSTEM

## 2023-12-16 PROCEDURE — 20000000 HC ICU ROOM

## 2023-12-16 RX ORDER — ETOMIDATE 2 MG/ML
INJECTION INTRAVENOUS
Status: COMPLETED
Start: 2023-12-16 | End: 2023-12-16

## 2023-12-16 RX ORDER — NOREPINEPHRINE BITARTRATE/D5W 4MG/250ML
0-.2 PLASTIC BAG, INJECTION (ML) INTRAVENOUS CONTINUOUS
Status: DISPENSED | OUTPATIENT
Start: 2023-12-16 | End: 2023-12-17

## 2023-12-16 RX ORDER — PROPOFOL 10 MG/ML
VIAL (ML) INTRAVENOUS
Status: COMPLETED
Start: 2023-12-16 | End: 2023-12-16

## 2023-12-16 RX ORDER — FENTANYL CITRATE 50 UG/ML
50 INJECTION, SOLUTION INTRAMUSCULAR; INTRAVENOUS ONCE
Status: DISCONTINUED | OUTPATIENT
Start: 2023-12-16 | End: 2023-12-18

## 2023-12-16 RX ORDER — ETOMIDATE 2 MG/ML
40 INJECTION INTRAVENOUS ONCE
Status: COMPLETED | OUTPATIENT
Start: 2023-12-16 | End: 2023-12-16

## 2023-12-16 RX ORDER — PHENYLEPHRINE HCL IN 0.9% NACL 1 MG/10 ML
SYRINGE (ML) INTRAVENOUS
Status: COMPLETED
Start: 2023-12-16 | End: 2023-12-16

## 2023-12-16 RX ORDER — MUPIROCIN 20 MG/G
OINTMENT TOPICAL 2 TIMES DAILY
Status: COMPLETED | OUTPATIENT
Start: 2023-12-16 | End: 2023-12-21

## 2023-12-16 RX ORDER — PROPOFOL 10 MG/ML
INJECTION, EMULSION INTRAVENOUS
Status: DISPENSED
Start: 2023-12-16 | End: 2023-12-16

## 2023-12-16 RX ORDER — ROCURONIUM BROMIDE 10 MG/ML
100 INJECTION, SOLUTION INTRAVENOUS ONCE
Status: COMPLETED | OUTPATIENT
Start: 2023-12-16 | End: 2023-12-16

## 2023-12-16 RX ORDER — PHENYLEPHRINE HYDROCHLORIDE 10 MG/ML
100 INJECTION INTRAVENOUS ONCE AS NEEDED
Status: DISCONTINUED | OUTPATIENT
Start: 2023-12-16 | End: 2023-12-17

## 2023-12-16 RX ORDER — SUCCINYLCHOLINE CHLORIDE 20 MG/ML
INJECTION INTRAMUSCULAR; INTRAVENOUS
Status: COMPLETED
Start: 2023-12-16 | End: 2023-12-16

## 2023-12-16 RX ORDER — GADOBUTROL 604.72 MG/ML
10 INJECTION INTRAVENOUS
Status: COMPLETED | OUTPATIENT
Start: 2023-12-17 | End: 2023-12-17

## 2023-12-16 RX ORDER — ROCURONIUM BROMIDE 10 MG/ML
INJECTION, SOLUTION INTRAVENOUS
Status: COMPLETED
Start: 2023-12-16 | End: 2023-12-16

## 2023-12-16 RX ORDER — MIDAZOLAM HYDROCHLORIDE 1 MG/ML
1 INJECTION INTRAMUSCULAR; INTRAVENOUS EVERY 5 MIN PRN
Status: DISCONTINUED | OUTPATIENT
Start: 2023-12-16 | End: 2023-12-17

## 2023-12-16 RX ADMIN — ETOMIDATE 40 MG: 2 INJECTION INTRAVENOUS at 12:12

## 2023-12-16 RX ADMIN — METHOCARBAMOL 500 MG: 100 INJECTION, SOLUTION INTRAMUSCULAR; INTRAVENOUS at 01:12

## 2023-12-16 RX ADMIN — PROPOFOL 20 MCG/KG/MIN: 10 INJECTION, EMULSION INTRAVENOUS at 04:12

## 2023-12-16 RX ADMIN — FENTANYL CITRATE 25 MCG: 50 INJECTION INTRAMUSCULAR; INTRAVENOUS at 04:12

## 2023-12-16 RX ADMIN — ACETAMINOPHEN 975 MG: 325 SUPPOSITORY RECTAL at 05:12

## 2023-12-16 RX ADMIN — MEROPENEM 2 G: 1 INJECTION INTRAVENOUS at 08:12

## 2023-12-16 RX ADMIN — POLYETHYLENE GLYCOL 3350 17 G: 17 POWDER, FOR SOLUTION ORAL at 08:12

## 2023-12-16 RX ADMIN — METHOCARBAMOL 500 MG: 100 INJECTION, SOLUTION INTRAMUSCULAR; INTRAVENOUS at 05:12

## 2023-12-16 RX ADMIN — GABAPENTIN 600 MG: 300 CAPSULE ORAL at 08:12

## 2023-12-16 RX ADMIN — SENNOSIDES AND DOCUSATE SODIUM 1 TABLET: 8.6; 5 TABLET ORAL at 08:12

## 2023-12-16 RX ADMIN — SODIUM CHLORIDE, POTASSIUM CHLORIDE, SODIUM LACTATE AND CALCIUM CHLORIDE 1000 ML: 600; 310; 30; 20 INJECTION, SOLUTION INTRAVENOUS at 03:12

## 2023-12-16 RX ADMIN — ROCURONIUM BROMIDE 100 MG: 10 INJECTION INTRAVENOUS at 12:12

## 2023-12-16 RX ADMIN — ROCURONIUM BROMIDE 100 MG: 10 INJECTION, SOLUTION INTRAVENOUS at 12:12

## 2023-12-16 RX ADMIN — ACETAMINOPHEN 975 MG: 325 SUPPOSITORY RECTAL at 03:12

## 2023-12-16 RX ADMIN — MEROPENEM 2 G: 1 INJECTION INTRAVENOUS at 04:12

## 2023-12-16 RX ADMIN — MEROPENEM 2 G: 1 INJECTION INTRAVENOUS at 02:12

## 2023-12-16 RX ADMIN — MUPIROCIN: 20 OINTMENT TOPICAL at 08:12

## 2023-12-16 RX ADMIN — NOREPINEPHRINE BITARTRATE 0.02 MCG/KG/MIN: 4 INJECTION, SOLUTION INTRAVENOUS at 05:12

## 2023-12-16 RX ADMIN — ACETAMINOPHEN 975 MG: 325 SUPPOSITORY RECTAL at 09:12

## 2023-12-16 RX ADMIN — VANCOMYCIN HYDROCHLORIDE 1750 MG: 500 INJECTION, POWDER, LYOPHILIZED, FOR SOLUTION INTRAVENOUS at 10:12

## 2023-12-16 RX ADMIN — GABAPENTIN 600 MG: 300 CAPSULE ORAL at 03:12

## 2023-12-16 NOTE — SUBJECTIVE & OBJECTIVE
Interval History: 12/16: POD 1 s/p L1-L4 TLIF. Neuro exam decline. Intubate and MRI pan spine    Medications:  Continuous Infusions:  Scheduled Meds:   acetaminophen  975 mg Rectal Q8H    fluticasone furoate-vilanteroL  1 puff Inhalation Daily    gabapentin  600 mg Per NG tube TID    meropenem (MERREM) IVPB  2 g Intravenous Q8H    methocarbamol (ROBAXIN) IVPB  500 mg Intravenous Q6H    polyethylene glycol  17 g Per NG tube BID    senna-docusate 8.6-50 mg  1 tablet Per NG tube Daily    vancomycin (VANCOCIN) IV (PEDS and ADULTS)  1,750 mg Intravenous Q24H     PRN Meds:aluminum-magnesium hydroxide-simethicone, dextrose 10%, dextrose 10%, diphenhydrAMINE, fentaNYL, glucagon (human recombinant), glucose, glucose, HYDROmorphone, insulin aspart U-100, levalbuterol, melatonin, morphine, naloxone, ondansetron, sodium chloride 0.9%, sodium chloride 0.9%, Pharmacy to dose Vancomycin consult **AND** vancomycin - pharmacy to dose     Review of Systems  Objective:     Weight: 119.7 kg (264 lb)  Body mass index is 38.99 kg/m².  Vital Signs (Most Recent):  Temp: 98.2 °F (36.8 °C) (12/16/23 1101)  Pulse: 104 (12/16/23 1101)  Resp: 13 (12/16/23 1101)  BP: 124/78 (12/16/23 1101)  SpO2: 100 % (12/16/23 1101) Vital Signs (24h Range):  Temp:  [94.5 °F (34.7 °C)-98.2 °F (36.8 °C)] 98.2 °F (36.8 °C)  Pulse:  [] 104  Resp:  [13-20] 13  SpO2:  [91 %-100 %] 100 %  BP: (120-154)/(65-87) 124/78  Arterial Line BP: (125-160)/(59-79) 125/59     Date 12/16/23 0700 - 12/17/23 0659   Shift 4834-1353 6067-9469 2804-6395 24 Hour Total   INTAKE   IV Piggyback 369.8   369.8   Shift Total(mL/kg) 369.8(3.1)   369.8(3.1)   OUTPUT   Urine(mL/kg/hr) 280   280   Shift Total(mL/kg) 280(2.3)   280(2.3)   Weight (kg) 119.7 119.7 119.7 119.7              Oxygen Concentration (%):  [28] 28             Closed/Suction Drain 12/15/23 1721 Tube - 1 Right Back Accordion 10 Fr. (Active)   Dressing Status Clean;Dry;Intact 12/16/23 1101   Dressing Intervention  "Integrity maintained 12/16/23 1101   Drainage Sanguineous;Bright red;Bloody 12/16/23 1101   Status Other (Comment) 12/16/23 1101   Output (mL) 160 mL 12/16/23 0528            Closed/Suction Drain 12/15/23 1722 Tube - 2 Left Back Accordion 10 Fr. (Active)   Dressing Status Clean;Dry;Intact 12/16/23 1101   Dressing Intervention Integrity maintained 12/16/23 1101   Drainage Sanguineous;Bright red;Bloody 12/16/23 1101   Status Other (Comment) 12/16/23 1101   Output (mL) 70 mL 12/16/23 0528            NG/OG Tube 12/14/23 0800 nasogastric Left nostril (Active)   Placement Check placement verified by x-ray 12/16/23 1101   Tolerance no signs/symptoms of discomfort 12/16/23 1101   Securement secured to nostril center w/ adhesive device 12/16/23 1101   Clamp Status/Tolerance clamped 12/16/23 1101   Suction Setting/Drainage Method suction at the bedside 12/16/23 1101   Insertion Site Appearance no redness, warmth, tenderness, skin breakdown, drainage 12/16/23 1101   Drainage None 12/16/23 1101   Flush/Irrigation flushed w/;water;no resistance met 12/16/23 1101   Feeding Type continuous 12/14/23 1226   Feeding Action feeding held 12/16/23 1101   Current Rate (mL/hr) 10 mL/hr 12/14/23 1226   Intake (mL) 50 mL 12/15/23 0006   Water Bolus (mL) 250 mL 12/14/23 2026   Tube Output(mL)(Include Discarded Residual) 0 mL 12/15/23 0006   Intake (mL) - Formula Tube Feeding 15 12/14/23 2026            Urethral Catheter 12/15/23 1636 Straight-tip;Silicone 16 Fr. (Active)   Site Assessment Clean;Intact 12/16/23 1101   Collection Container Standard drainage bag 12/16/23 1101   Securement Method secured to top of thigh w/ adhesive device 12/16/23 1101   Catheter Care Performed yes 12/16/23 1101   Reason for Continuing Urinary Catheterization Post operative 12/16/23 1101   CAUTI Prevention Bundle Securement Device in place with 1" slack;Intact seal between catheter & drainage tubing;Drainage bag/urimeter off the floor;Sheeting clip in use;No " dependent loops or kinks;Drainage bag/urimeter not overfilled (<2/3 full);Drainage bag/urimeter below bladder 12/16/23 1101   Output (mL) 125 mL 12/16/23 1101          Physical Exam    [id number], Loc BUE, WD BLE       Neurosurgery Physical Exam    Significant Labs:  Recent Labs   Lab 12/15/23  0638 12/16/23  0141    188*    137   K 4.3 4.6    107   CO2 27 24   BUN 42* 44*   CREATININE 1.0 1.1   CALCIUM 9.4 9.2   MG 2.2 2.1     Recent Labs   Lab 12/15/23  0638 12/16/23  0141   WBC 15.58* 20.06*   HGB 9.9* 8.8*   HCT 29.1* 26.6*    332     Recent Labs   Lab 12/14/23  1809   INR 1.0   APTT 25.9     Microbiology Results (last 7 days)       Procedure Component Value Units Date/Time    Aerobic culture [[id number]] Collected: 12/13/23 1703    Order Status: Completed Specimen: Abscess from Buttocks, Left Updated: 12/16/23 1109     Aerobic Bacterial Culture No growth    Aerobic culture [[id number]] Collected: 12/15/23 1603    Order Status: Completed Specimen: Wound from Back Updated: 12/16/23 0718     Aerobic Bacterial Culture No growth    Narrative:      Paraspinal    Aerobic culture [[id number]] Collected: 12/15/23 1655    Order Status: Completed Specimen: Wound from Back Updated: 12/16/23 0718     Aerobic Bacterial Culture No growth    Narrative:      2.) 2,3 Disc space    Culture, Anaerobe [[id number]] Collected: 12/15/23 1655    Order Status: Completed Specimen: Wound from Back Updated: 12/16/23 0710     Anaerobic Culture Culture in progress    Narrative:      2.) 2,3 Disc space    Culture, Anaerobe [[id number]] Collected: 12/15/23 1657    Order Status: Completed Specimen: Wound from Back Updated: 12/16/23 0710     Anaerobic Culture Culture in progress    Narrative:      3.) 2,3 Disc space    Culture, Anaerobe [[id number]] Collected: 12/15/23 1603    Order Status: Completed Specimen: Wound from Back Updated: 12/16/23 0710     Anaerobic Culture Culture in progress    Narrative:      Paraspinal     Aerobic culture [[id number]] Collected: 12/15/23 1657    Order Status: Completed Specimen: Wound from Back Updated: 12/16/23 0702     Aerobic Bacterial Culture No growth    Narrative:      3.) 2,3 Disc space    Blood culture [[id number]] Collected: 12/12/23 0033    Order Status: Completed Specimen: Blood Updated: 12/16/23 0612     Blood Culture, Routine No Growth to date      No Growth to date      No Growth to date      No Growth to date      No Growth to date    Blood culture [[id number]] Collected: 12/12/23 0033    Order Status: Completed Specimen: Blood Updated: 12/16/23 0612     Blood Culture, Routine No Growth to date      No Growth to date      No Growth to date      No Growth to date      No Growth to date    Blood culture x two cultures. Draw prior to antibiotics. [[id number]] Collected: 12/10/23 2007    Order Status: Completed Specimen: Blood from Peripheral, Antecubital, Left Updated: 12/15/23 2212     Blood Culture, Routine No growth after 5 days.    Narrative:      Aerobic and anaerobic    Blood culture x two cultures. Draw prior to antibiotics. [[id number]] Collected: 12/10/23 2007    Order Status: Completed Specimen: Blood from Peripheral, Hand, Left Updated: 12/15/23 2212     Blood Culture, Routine No growth after 5 days.    Narrative:      Aerobic and anaerobic    Gram stain [[id number]] Collected: 12/15/23 1657    Order Status: Completed Specimen: Wound from Back Updated: 12/15/23 1900     Gram Stain Result No WBC's      No organisms seen    Narrative:      3.) 2,3 Disc space    Gram stain [[id number]] Collected: 12/15/23 1655    Order Status: Completed Specimen: Wound from Back Updated: 12/15/23 1857     Gram Stain Result No WBC's      No organisms seen    Narrative:      2.) 2,3 Disc space    Gram stain [[id number]] Collected: 12/15/23 1603    Order Status: Completed Specimen: Wound from Back Updated: 12/15/23 1855     Gram Stain Result No WBC's      No organisms seen    Narrative:       Paraspinal    Fungus culture [[id number]] Collected: 12/15/23 1655    Order Status: Sent Specimen: Wound from Back Updated: 12/15/23 1730    AFB Culture & Smear [[id number]] Collected: 12/15/23 1655    Order Status: Sent Specimen: Wound from Back Updated: 12/15/23 1730    AFB Culture & Smear [[id number]] Collected: 12/15/23 1657    Order Status: Sent Specimen: Wound from Back Updated: 12/15/23 1728    Fungus culture [[id number]] Collected: 12/15/23 1657    Order Status: Sent Specimen: Wound from Back Updated: 12/15/23 1727    Fungus culture [[id number]] Collected: 12/15/23 1603    Order Status: Sent Specimen: Wound from Back Updated: 12/15/23 1621    AFB Culture & Smear [[id number]] Collected: 12/15/23 1603    Order Status: Sent Specimen: Wound from Back Updated: 12/15/23 1620    Culture, Anaerobe [[id number]]     Order Status: No result Specimen: Bone from Back     Aerobic culture [[id number]]     Order Status: No result Specimen: Bone from Back     AFB Culture & Smear [[id number]]     Order Status: No result Specimen: Bone from Back     Gram stain [[id number]]     Order Status: No result Specimen: Bone from Back     Fungus culture [[id number]]     Order Status: No result Specimen: Bone from Back     Culture, Anaerobic [[id number]] Collected: 12/13/23 1703    Order Status: Completed Specimen: Abscess from Buttocks, Left Updated: 12/15/23 0707     Anaerobic Culture Culture in progress    AFB Culture & Smear [[id number]] Collected: 12/13/23 1703    Order Status: Completed Specimen: Abscess from Buttocks, Left Updated: 12/14/23 2128     AFB Culture & Smear Culture in progress     AFB CULTURE STAIN No acid fast bacilli seen.    Gram stain [[id number]] Collected: 12/13/23 1703    Order Status: Completed Specimen: Abscess from Buttocks, Left Updated: 12/13/23 2104     Gram Stain Result No WBC's      No organisms seen    Fungus culture [[id number]] Collected: 12/13/23 1703    Order Status: Sent Specimen: Abscess from  Buttocks, Left Updated: 12/13/23 1928          All pertinent labs from the last 24 hours have been reviewed.    Significant Diagnostics:  I have reviewed and interpreted all pertinent imaging results/findings within the past 24 hours.

## 2023-12-16 NOTE — H&P
"[facility] - Neuro Critical Care  Neurocritical Care  History & Physical    Admit Date: 12/10/2023  Service Date: 12/15/2023  Length of Stay: 4    Subjective:     Chief Complaint: Acute osteomyelitis of lumbar spine    History of Present Illness: Bj Pate Jr. is a 73 year old male with a medical history significant for CKD3, HTN, DM2, COPD and a prior right thalamic infarct (2016) who is admitted to [facility] for post operative monitoring following a L1-L4 segmental posterolateral fusion, L2-L4 laminectomy and L2-L3 TLIF. Patient initially admitted to [facility] on 12/10 for evaluation of increasing lethargy, progressive encephalopathy and lower back pain. Work up revealed L2-L4 discitis/osteomyelitis and left psoas abscess. Started on Meropenem and Vancomycin per ID recommendations. S/p IR biopsy of psoas abscess and L2-L3 disc space. Cultures no growth to date. Underwent L1-L4 segmental posterolateral fusion, L2-L4 laminectomy and L2-L3 TLIF with noted purulent material noted at surgical site sent for culture.     On admission to [facility], patient with residual anesthesia effects. Oral airway in place. Opens eyes to voice and follows some basic commands in BLE (wiggles toes). No movement noted in BUE. Per documentation, prior to surgery, patient alert and oriented with GCS 15. Dysarthric and "moves all extremities spontaneously with good tone. BUE 2/5, BLE 2/5 except DF/PF/EHL 3/5."      Past Medical History:   Diagnosis Date    Acute on chronic systolic heart failure 12/13/2023    Anemia 06/04/2021    Anticoagulant long-term use     Basal ganglia hemorrhage     Left basal ganglia hemorrhage with resultant right-sided hemiparesis which has resolved.     Benign hypertension with CKD (chronic kidney disease) stage III      Cataract     Chronic idiopathic gout of multiple sites     Chronic kidney disease, stage 3     COPD (chronic obstructive pulmonary disease)     Erectile dysfunction     Gout     Hemorrhoids without complication  "    Hyperlipidemia     Morbid obesity     Obstructive sleep apnea on CPAP     Reactive airway disease without complication 11/12/2021    Stroke 2016, 2006    Thalamic infarct, acute (right) 01/2016    Type 2 DM with CKD stage 3 and hypertension     On pravastatin for cardiovascular protection.      Past Surgical History:   Procedure Laterality Date    CARPAL TUNNEL RELEASE Left 2/19/2020    Procedure: RELEASE, CARPAL TUNNEL LEFT;  Surgeon: Maria Luisa Mccurdy MD;  Location: [facility] OR;  Service: Orthopedics;  Laterality: Left;    COLONOSCOPY N/A 7/28/2023    Procedure: COLONOSCOPY;  Surgeon: Jaylene Alvarado MD;  Location: [facility] ENDO;  Service: Endoscopy;  Laterality: N/A;    EPIDURAL STEROID INJECTION N/A 5/2/2019    Procedure: Injection, Steroid, Epidural Cervical;  Surgeon: Dariel Doan Jr., MD;  Location: [facility] ENDO;  Service: Pain Management;  Laterality: N/A;  Cervical Epidural Steroid Injection     [id number]    Arrive @ 1130; ASA; Check BG    EPIDURAL STEROID INJECTION Bilateral 5/29/2019    Procedure: Lumbar Medial Branch Blocks;  Surgeon: Dariel Doan Jr., MD;  Location: [facility];  Service: Pain Management;  Laterality: Bilateral;  Bilateral Lumbar Medial Branch Blocks L3, L4, L5    [id number]  [id number]    Attive @ 1030 (11 arrival request); NO Sedation; ASA; Check BG    EPIDURAL STEROID INJECTION Bilateral 7/3/2019    Procedure: Lumbar Medial Branch Blocks;  Surgeon: Dariel Doan Jr., MD;  Location: [facility] ENDO;  Service: Pain Management;  Laterality: Bilateral;  Bilateral Lumbar Medial Branch Blocks L3, L4, L5    [id number]  [id number]    Arrive @ 0930; NO Sedation; ASA; Check BG    EPIDURAL STEROID INJECTION N/A 8/7/2019    Procedure: Injection, Steroid, Epidural Cervical;  Surgeon: Dariel Doan Jr., MD;  Location: [facility] ENDO;  Service: Pain Management;  Laterality: N/A;  Cervical Epidural Steroid Injection C7-T1    [id number]    Arrive @ 1115; Last ASA 7/30; Check BG    ESOPHAGOGASTRODUODENOSCOPY N/A  7/28/2023    Procedure: EGD (ESOPHAGOGASTRODUODENOSCOPY);  Surgeon: Jaylene Alvarado MD;  Location: [facility] ENDO;  Service: Endoscopy;  Laterality: N/A;  inst via portal    LEFT HEART CATHETERIZATION Left 9/21/2021    Procedure: Left heart cath 9am start, R rad access;  Surgeon: Dariel Conner MD;  Location: [facility] CATH LAB;  Service: Cardiology;  Laterality: Left;  RN PRE OP Covid NEGATIVE ON  9-20-21.  C A    MIDLINE CATHETER PLACEMENT  12/12/2023    NO PAST SURGERIES        No current facility-administered medications on file prior to encounter.     Current Outpatient Medications on File Prior to Encounter   Medication Sig Dispense Refill    acetaminophen (TYLENOL) 650 MG TbSR Take 1,300 mg by mouth every 6 (six) hours as needed (Pain).      ascorbic acid, vitamin C, (VITAMIN C) 500 MG tablet Take 500 mg by mouth once daily.      atorvastatin (LIPITOR) 40 MG tablet Take 1 tablet (40 mg total) by mouth once daily. 90 tablet 1    cyclobenzaprine (FLEXERIL) 10 MG tablet Take 10 mg by mouth 3 (three) times daily as needed for Muscle spasms.      docusate sodium (COLACE) 100 MG capsule Take 100 mg by mouth daily as needed for Constipation.      furosemide (LASIX) 20 MG tablet Take 1 tablet (20 mg total) by mouth 2 (two) times daily. 60 tablet 0    gabapentin (NEURONTIN) 600 MG tablet TAKE 1 TABLET(600 MG) BY MOUTH THREE TIMES DAILY AS NEEDED 90 tablet 1    HYDROcodone-acetaminophen (NORCO) 5-325 mg per tablet Take 1 tablet by mouth every 6 (six) hours as needed for Pain. 10 tablet 0    PREVIDENT 5000 SENSITIVE 1.1-5 % Pste Brush teeth every other day as directed.      tamsulosin (FLOMAX) 0.4 mg Cap Take 0.4 mg by mouth once daily.      aspirin (ECOTRIN) 81 MG EC tablet Take 81 mg by mouth once daily.      blood pressure test kit-large Kit 1 Device by Misc.(Non-Drug; Combo Route) route 2 (two) times daily. 1 each 0    blood sugar diagnostic Strp To check BG 2 times daily, to use with insurance preferred meter 200  strip 3    blood-glucose meter kit To check BG 2 times daily, to use with insurance preferred meter 1 each 0    diltiaZEM (CARDIZEM CD) 240 MG 24 hr capsule Take 1 capsule (240 mg total) by mouth once daily. (Patient not taking: Reported on 12/11/2023) 90 capsule 2    fluticasone-salmeterol diskus inhaler 500-50 mcg Inhale 1 puff into the lungs 2 (two) times daily. Controller 60 each 11    lancets Misc To check BG 2 times daily, to use with insurance preferred meter 200 each 3    PFIZER COVID BIVAL,12Y UP,,PF, 30 mcg/0.3 mL injection       [DISCONTINUED] nystatin (MYCOSTATIN) cream APPLY   TOPICALLY TO AFFECTED AREA TWICE DAILY 60 g 0      Allergies: Tomato (solanum lycopersicum), Naproxen, and Shrimp    N/A  Family history non-contributory to current problem     Social History     Tobacco Use    Smoking status: Never    Smokeless tobacco: Never   Substance Use Topics    Alcohol use: Yes     Alcohol/week: 0.0 standard drinks of alcohol     Comment: occacionally    Drug use: No     Review of Systems   Unable to perform ROS: Mental status change     Objective:     Vitals:    Temp: (!) 94.5 °F (34.7 °C)  Pulse: 100  Rhythm: sinus tachycardia  BP: (!) 154/74  MAP (mmHg): 99  Resp: 17  SpO2: 99 %    Temp  Min: 94.5 °F (34.7 °C)  Max: 98.3 °F (36.8 °C)  Pulse  Min: 97  Max: 106  BP  Min: 109/61  Max: 165/83  MAP (mmHg)  Min: 81  Max: 120  Resp  Min: 16  Max: 20  SpO2  Min: 96 %  Max: 99 %    12/14 0701 - 12/15 0700  In: 1943.2 [I.V.:48]  Out: 2160 [Urine:2150; Drains:10]   Unmeasured Output  Urine Occurrence: 1  Stool Occurrence: 1        Physical Exam  Vitals and nursing note reviewed.   Constitutional:       General: He is not in acute distress.     Appearance: He is ill-appearing.   HENT:      Head: Normocephalic and atraumatic.      Mouth/Throat:      Comments: OPA in place  Eyes:      Pupils: Pupils are equal, round, and reactive to light.      Comments: Crosses midline towards voice   Pulmonary:      Effort:  Pulmonary effort is normal.   Abdominal:      General: Abdomen is flat. There is no distension.   Neurological:      Mental Status: He is alert.      Comments:   [id number]  Opens eyes to verbal stimuli  Follows basic commands in BLE  No movement in BUE     Unable to test orientation, language, memory, judgment, insight, fund of knowledge, hearing, shoulder shrug, tongue protrusion, coordination, gait due to level of consciousness.     Today I personally reviewed pertinent medications, lines/drains/airways, imaging, cardiology results, laboratory results, microbiology results, notably:    Estimated Creatinine Clearance: 86.5 mL/min (based on SCr of 1 mg/dL).      Assessment/Plan:     Neuro  Encephalopathy acute  See Acute osteomyelitis of lumbar spine     AMS (altered mental status)  Reportedly alert and oriented prior to surgery   Follow exam   See Acute osteomyelitis of lumbar spine     History of CVA (cerebrovascular accident)  R thalamic, 2016  See Acute osteomyelitis of lumbar spine     Cardiac/Vascular  Acute on chronic systolic heart failure  EF 45-50%  See Acute osteomyelitis of lumbar spine     Renal/  Urinary retention  Maintain bryant per neurosurgery request  See Acute osteomyelitis of lumbar spine     ID  * Acute osteomyelitis of lumbar spine  73M CKD3, HTN, DM2, COPD and a prior right thalamic infarct (2016) who is admitted to [facility] for post operative monitoring following a L1-L4 segmental posterolateral fusion, L2-L4 laminectomy and L2-L3 TLIF for L2-L4 discitis/osteomyelitis.     S/p IR biopsy of psoas abscess and L2-L3 disc space. Cultures no growth to date.    - Admit to NCC  - Q1h neurochecks while in ICU (follow neurologic exam as anesthesia wears off)   - Q1h vitals while in ICU  - MAP>85, maintaining without intervention  - Echo reviewed with ejection fraction of 45 - 50%   - On supplemental oxygen, weaning as able  - Admit CXR unremarkable  - Continue Meropenem and Vancomycin per ID  recommendations  - Follow OR cultures  - Daily CMP, Mag, Phos - replete electrolytes PRN  - NPO pending improvement in mental status  - Strict I/Os  - Daily CBC, transfuse PRN  - Start SubQ Heparin in when clinically indicated, held during acute perioperative period   - PT/OT/SLP as appropriate  - CM/SW consult for dispo planning    Osteomyelitis  ID following  See Acute osteomyelitis of lumbar spine     Discitis  ID following  See Acute osteomyelitis of lumbar spine     Severe sepsis  ID following  See Acute osteomyelitis of lumbar spine     Endocrine  Malnutrition  See Acute osteomyelitis of lumbar spine     Morbid obesity with BMI of 45.0-49.9, adult  See Acute osteomyelitis of lumbar spine     GI  Psoas muscle abscess  S/p IR biopsy  See Acute osteomyelitis of lumbar spine     Orthopedic  Back pain  Multimodal pain regimen   See Acute osteomyelitis of lumbar spine           The patient is being Prophylaxed for:  Venous Thromboembolism with: Mechanical  Stress Ulcer with: Not Applicable   Ventilator Pneumonia with: not applicable    N/A  Family history non-contributory to current problem     Activity Orders            Diet Adult Regular (IDDSI Level 7): Regular starting at 12/15 1934    Elevate HOB Elevate (30-45 degrees) Elevate HOB to 30 - 45 degrees during feeding unless otherwise stated starting at 12/13 1828    Turn patient every 2 hours starting at 12/12 0400    Progressive Mobility Protocol (mobilize patient to their highest level of functioning at least twice daily) starting at 12/11 0800          Full Code    Dariel Torres MD  Neurocritical Care  [facility] - Neuro Critical Care

## 2023-12-16 NOTE — SUBJECTIVE & OBJECTIVE
Interval History:  Went for CTA because of lethargy since surgery. Did not tolerate procedure, returned to [facility]. Sedated and intubated for MRI pan spine, as patient had post surgical changes in exam. Continuing Vanc and Deo.     Review of Systems   Unable to perform ROS: Mental status change       Objective:     Vitals:  Temp: 98.2 °F (36.8 °C)  Pulse: 104  Rhythm: sinus tachycardia  BP: 124/78  MAP (mmHg): 95  Resp: 13  SpO2: 100 %  Oxygen Concentration (%): 28    Temp  Min: 94.5 °F (34.7 °C)  Max: 98.2 °F (36.8 °C)  Pulse  Min: 92  Max: 104  BP  Min: 120/73  Max: 154/74  MAP (mmHg)  Min: 82  Max: 110  Resp  Min: 13  Max: 20  SpO2  Min: 91 %  Max: 100 %  Oxygen Concentration (%)  Min: 28  Max: 28    12/15 0701 - 12/16 0700  In: 2888.9 [I.V.:1250]  Out: 1235 [Urine:705; Drains:230]   Unmeasured Output  Urine Occurrence: 1  Stool Occurrence: 1        Physical Exam  Vitals and nursing note reviewed.   Constitutional:       General: He is not in acute distress.  HENT:      Head: Normocephalic and atraumatic.   Eyes:      Pupils: Pupils are equal, round, and reactive to light.   Cardiovascular:      Rate and Rhythm: Normal rate and regular rhythm.      Heart sounds: No murmur heard.  Pulmonary:      Effort: Pulmonary effort is normal. No respiratory distress.   Abdominal:      General: Abdomen is flat. There is no distension.      Tenderness: There is no abdominal tenderness.   Musculoskeletal:      Right lower leg: No edema.      Left lower leg: No edema.   Neurological:      Mental Status: He is alert.      Comments: E2 V1 M5  Not following commands  Localizes to noxious stimuli in BL upper extremities  Withdraws in LE  PEERL              Medications:  Continuouspropofol    Scheduledacetaminophen, 975 mg, Q8H  fentaNYL, 50 mcg, Once  fluticasone furoate-vilanteroL, 1 puff, Daily  gabapentin, 600 mg, TID  meropenem (MERREM) IVPB, 2 g, Q8H  methocarbamol (ROBAXIN) IVPB, 500 mg, Q6H  polyethylene glycol, 17 g,  BID  senna-docusate 8.6-50 mg, 1 tablet, Daily  vancomycin (VANCOCIN) IV (PEDS and ADULTS), 1,750 mg, Q24H    PRNaluminum-magnesium hydroxide-simethicone, 30 mL, QID PRN  dextrose 10%, 12.5 g, PRN  dextrose 10%, 25 g, PRN  diphenhydrAMINE, 25 mg, Q6H PRN  fentaNYL, 25 mcg, Q5 Min PRN  glucagon (human recombinant), 1 mg, PRN  glucose, 16 g, PRN  glucose, 24 g, PRN  HYDROmorphone, 0.2 mg, Q5 Min PRN  insulin aspart U-100, 0-5 Units, Q6H PRN  levalbuterol, 0.63 mg, Q6H PRN  melatonin, 6 mg, Nightly PRN  morphine, 2 mg, Q4H PRN  naloxone, 0.02 mg, PRN  ondansetron, 4 mg, Q8H PRN  PHENYLephrine, 100 mcg, Once PRN  sodium chloride 0.9%, 10 mL, Q12H PRN  sodium chloride 0.9%, 3 mL, PRN  vancomycin - pharmacy to dose, , pharmacy to manage frequency      Today I personally reviewed pertinent medications, lines/drains/airways, imaging, cardiology results, laboratory results, microbiology results, notably:    Diet  No diet orders on file  No diet orders on file

## 2023-12-16 NOTE — RESPIRATORY THERAPY
Pt non-emergently intubated for MRI scan at 1210 with a 8.0 ET tube measuring 26 cm at the lips. Procedure tolerated well, will continue to monitor.

## 2023-12-16 NOTE — ANESTHESIA POSTPROCEDURE EVALUATION
Anesthesia Post Evaluation    Patient: Bj Pate Jr.    Procedure(s) Performed: Procedure(s) (LRB):  L-1 TO L-4 FUSION, SPINE, LUMBAR, TLIF, POSTERIOR APPROACH, USING PEDICLE SCREW (N/A)    Final Anesthesia Type: general      Patient location during evaluation: PACU  Patient participation: Yes- Able to Participate  Level of consciousness: awake  Post-procedure vital signs: reviewed and stable  Pain management: adequate  Airway patency: patent    PONV status at discharge: No PONV  Anesthetic complications: no      Cardiovascular status: blood pressure returned to baseline  Respiratory status: unassisted  Hydration status: euvolemic  Follow-up not needed.              Vitals Value Taken Time   /80 12/16/23 0031   Temp 36.5 °C (97.7 °F) 12/15/23 2302   Pulse 97 12/16/23 0037   Resp 17 12/16/23 0037   SpO2 94 % 12/16/23 0037   Vitals shown include unvalidated device data.      No case tracking events are documented in the log.      Pain/Matthew Score: Pain Rating Prior to Med Admin: 0 (12/15/2023  9:50 PM)  Pain Rating Post Med Admin: 0 (12/15/2023 10:31 PM)

## 2023-12-16 NOTE — PROCEDURES
"Bj Pate Jr. is a 73 y.o. male patient.    Temp: 98.2 °F (36.8 °C) (23)  Pulse: 104 (23)  Resp: 13 (23)  BP: 124/78 (23)  SpO2: 100 % (23)  Weight: 119.7 kg (264 lb) (12/15/23 2003)  Height: 5' 9" (175.3 cm) (12/15/23 2003)       Intubation    Date/Time: 2023 12:25 PM  Location procedure was performed: [facility] NEURO CRITICAL CARE    Performed by: Charlie Vargas MD  Authorized by: Charlie Vargas MD  Assisting provider: Hever Tate MD  Consent Done: Yes  Consent: Verbal consent obtained.  Consent given by: Daughter.  Patient identity confirmed:  and name  Indications: airway protection  Intubation method: direct  Patient status: paralyzed (RSI)  Preoxygenation: mask  Sedatives: etomidate  Paralytic: rocuronium  Laryngoscope size: Mac 4  Tube size: 8.0 mm  Tube type: cuffed  Number of attempts: 1  Cords visualized: yes  Post-procedure assessment: chest rise and CO2 detector  Cuff inflated: yes  ETT to lip: 25 cm  Tube secured with: ETT christy  Chest x-ray interpreted by radiologist.  Complications: No  Specimens: No  Implants: No          2023    "

## 2023-12-16 NOTE — ASSESSMENT & PLAN NOTE
A 73M w/ hx CVA, CKD3, HTN, DM2, and COPD who presents with AMS likely 2/2 underling sepsis as well as progressive back pain and inability to ambulate for the past 2 weeks due to progressive L2-L3 osteodiscitis and presumed mechanical instability.          CTH 12/11: lacuanr infarcts   MRI w/wo L sp 12/11: severe central stenosis L4-5, discitis osteomyelitis at L2-3, L psoas abscess up to 2 cm  MRI C/T w/wo: very poor with alot of motion artifact, but no OM else where   US BLE 12/12: negative  12/10: , CRP >120  BCx NGTD  MRI Head, Csp, CTH 12/16: nondiagnostic     Recs  -admit to neuroICU, neurochecks per protocol  -all pertinent diagnostics reviewed  -f/u intraoperative cultures, continue abx  -HV to full suction  -MAP > 85  -intubate and MRI pan spine  -rest of care per primary  -please notify neurosurgery on call for any acute neurologic change    Dispo: ongoing  D/w Dr. Rincon

## 2023-12-16 NOTE — PROGRESS NOTES
[facility] - Neuro Critical Care  Neurosurgery  Progress Note    Subjective:     History of Present Illness: Mr. Pate is a 73M w/ hx CVA, CKD3, HTN, DM2, COPD who presents with increasing lethargy and confusion. Per ED team, patient had 3 weeks of low back pain, with a fall yesterday leading to his presentation. He had subjective lower extremity weakness and urinary retention after the fall. Septic on presentation to ED. Too altered to meaningfully participate in spine motor exam at time of NSGY evaluation.     Post-Op Info:  Procedure(s) (LRB):  L-1 TO L-4 FUSION, SPINE, LUMBAR, TLIF, POSTERIOR APPROACH, USING PEDICLE SCREW (N/A)   1 Day Post-Op   Interval History: 12/16: POD 1 s/p L1-L4 TLIF. Neuro exam decline. Intubate and MRI pan spine    Medications:  Continuous Infusions:  Scheduled Meds:   acetaminophen  975 mg Rectal Q8H    fluticasone furoate-vilanteroL  1 puff Inhalation Daily    gabapentin  600 mg Per NG tube TID    meropenem (MERREM) IVPB  2 g Intravenous Q8H    methocarbamol (ROBAXIN) IVPB  500 mg Intravenous Q6H    polyethylene glycol  17 g Per NG tube BID    senna-docusate 8.6-50 mg  1 tablet Per NG tube Daily    vancomycin (VANCOCIN) IV (PEDS and ADULTS)  1,750 mg Intravenous Q24H     PRN Meds:aluminum-magnesium hydroxide-simethicone, dextrose 10%, dextrose 10%, diphenhydrAMINE, fentaNYL, glucagon (human recombinant), glucose, glucose, HYDROmorphone, insulin aspart U-100, levalbuterol, melatonin, morphine, naloxone, ondansetron, sodium chloride 0.9%, sodium chloride 0.9%, Pharmacy to dose Vancomycin consult **AND** vancomycin - pharmacy to dose     Review of Systems  Objective:     Weight: 119.7 kg (264 lb)  Body mass index is 38.99 kg/m².  Vital Signs (Most Recent):  Temp: 98.2 °F (36.8 °C) (12/16/23 1101)  Pulse: 104 (12/16/23 1101)  Resp: 13 (12/16/23 1101)  BP: 124/78 (12/16/23 1101)  SpO2: 100 % (12/16/23 1101) Vital Signs (24h Range):  Temp:  [94.5 °F (34.7 °C)-98.2 °F (36.8 °C)] 98.2 °F (36.8  °C)  Pulse:  [] 104  Resp:  [13-20] 13  SpO2:  [91 %-100 %] 100 %  BP: (120-154)/(65-87) 124/78  Arterial Line BP: (125-160)/(59-79) 125/59     Date 12/16/23 0700 - 12/17/23 0659   Shift 9319-3136 8823-4415 9806-8343 24 Hour Total   INTAKE   IV Piggyback 369.8   369.8   Shift Total(mL/kg) 369.8(3.1)   369.8(3.1)   OUTPUT   Urine(mL/kg/hr) 280   280   Shift Total(mL/kg) 280(2.3)   280(2.3)   Weight (kg) 119.7 119.7 119.7 119.7              Oxygen Concentration (%):  [28] 28             Closed/Suction Drain 12/15/23 1721 Tube - 1 Right Back Accordion 10 Fr. (Active)   Dressing Status Clean;Dry;Intact 12/16/23 1101   Dressing Intervention Integrity maintained 12/16/23 1101   Drainage Sanguineous;Bright red;Bloody 12/16/23 1101   Status Other (Comment) 12/16/23 1101   Output (mL) 160 mL 12/16/23 0528            Closed/Suction Drain 12/15/23 1722 Tube - 2 Left Back Accordion 10 Fr. (Active)   Dressing Status Clean;Dry;Intact 12/16/23 1101   Dressing Intervention Integrity maintained 12/16/23 1101   Drainage Sanguineous;Bright red;Bloody 12/16/23 1101   Status Other (Comment) 12/16/23 1101   Output (mL) 70 mL 12/16/23 0528            NG/OG Tube 12/14/23 0800 nasogastric Left nostril (Active)   Placement Check placement verified by x-ray 12/16/23 1101   Tolerance no signs/symptoms of discomfort 12/16/23 1101   Securement secured to nostril center w/ adhesive device 12/16/23 1101   Clamp Status/Tolerance clamped 12/16/23 1101   Suction Setting/Drainage Method suction at the bedside 12/16/23 1101   Insertion Site Appearance no redness, warmth, tenderness, skin breakdown, drainage 12/16/23 1101   Drainage None 12/16/23 1101   Flush/Irrigation flushed w/;water;no resistance met 12/16/23 1101   Feeding Type continuous 12/14/23 1226   Feeding Action feeding held 12/16/23 1101   Current Rate (mL/hr) 10 mL/hr 12/14/23 1226   Intake (mL) 50 mL 12/15/23 0006   Water Bolus (mL) 250 mL 12/14/23 2026   Tube Output(mL)(Include  "Discarded Residual) 0 mL 12/15/23 0006   Intake (mL) - Formula Tube Feeding 15 12/14/23 2026            Urethral Catheter 12/15/23 1636 Straight-tip;Silicone 16 Fr. (Active)   Site Assessment Clean;Intact 12/16/23 1101   Collection Container Standard drainage bag 12/16/23 1101   Securement Method secured to top of thigh w/ adhesive device 12/16/23 1101   Catheter Care Performed yes 12/16/23 1101   Reason for Continuing Urinary Catheterization Post operative 12/16/23 1101   CAUTI Prevention Bundle Securement Device in place with 1" slack;Intact seal between catheter & drainage tubing;Drainage bag/urimeter off the floor;Sheeting clip in use;No dependent loops or kinks;Drainage bag/urimeter not overfilled (<2/3 full);Drainage bag/urimeter below bladder 12/16/23 1101   Output (mL) 125 mL 12/16/23 1101          Physical Exam    [id number], Loc BUE, WD BLE       Neurosurgery Physical Exam    Significant Labs:  Recent Labs   Lab 12/15/23  0638 12/16/23  0141    188*    137   K 4.3 4.6    107   CO2 27 24   BUN 42* 44*   CREATININE 1.0 1.1   CALCIUM 9.4 9.2   MG 2.2 2.1     Recent Labs   Lab 12/15/23  0638 12/16/23  0141   WBC 15.58* 20.06*   HGB 9.9* 8.8*   HCT 29.1* 26.6*    332     Recent Labs   Lab 12/14/23  1809   INR 1.0   APTT 25.9     Microbiology Results (last 7 days)       Procedure Component Value Units Date/Time    Aerobic culture [[id number]] Collected: 12/13/23 1703    Order Status: Completed Specimen: Abscess from Buttocks, Left Updated: 12/16/23 1109     Aerobic Bacterial Culture No growth    Aerobic culture [[id number]] Collected: 12/15/23 1603    Order Status: Completed Specimen: Wound from Back Updated: 12/16/23 0718     Aerobic Bacterial Culture No growth    Narrative:      Paraspinal    Aerobic culture [[id number]] Collected: 12/15/23 1655    Order Status: Completed Specimen: Wound from Back Updated: 12/16/23 0718     Aerobic Bacterial Culture No growth    Narrative:      2.) 2,3 " Disc space    Culture, Anaerobe [[id number]] Collected: 12/15/23 1655    Order Status: Completed Specimen: Wound from Back Updated: 12/16/23 0710     Anaerobic Culture Culture in progress    Narrative:      2.) 2,3 Disc space    Culture, Anaerobe [[id number]] Collected: 12/15/23 1657    Order Status: Completed Specimen: Wound from Back Updated: 12/16/23 0710     Anaerobic Culture Culture in progress    Narrative:      3.) 2,3 Disc space    Culture, Anaerobe [[id number]] Collected: 12/15/23 1603    Order Status: Completed Specimen: Wound from Back Updated: 12/16/23 0710     Anaerobic Culture Culture in progress    Narrative:      Paraspinal    Aerobic culture [[id number]] Collected: 12/15/23 1657    Order Status: Completed Specimen: Wound from Back Updated: 12/16/23 0702     Aerobic Bacterial Culture No growth    Narrative:      3.) 2,3 Disc space    Blood culture [[id number]] Collected: 12/12/23 0033    Order Status: Completed Specimen: Blood Updated: 12/16/23 0612     Blood Culture, Routine No Growth to date      No Growth to date      No Growth to date      No Growth to date      No Growth to date    Blood culture [[id number]] Collected: 12/12/23 0033    Order Status: Completed Specimen: Blood Updated: 12/16/23 0612     Blood Culture, Routine No Growth to date      No Growth to date      No Growth to date      No Growth to date      No Growth to date    Blood culture x two cultures. Draw prior to antibiotics. [[id number]] Collected: 12/10/23 2007    Order Status: Completed Specimen: Blood from Peripheral, Antecubital, Left Updated: 12/15/23 2212     Blood Culture, Routine No growth after 5 days.    Narrative:      Aerobic and anaerobic    Blood culture x two cultures. Draw prior to antibiotics. [[id number]] Collected: 12/10/23 2007    Order Status: Completed Specimen: Blood from Peripheral, Hand, Left Updated: 12/15/23 2212     Blood Culture, Routine No growth after 5 days.    Narrative:      Aerobic and anaerobic     Gram stain [[id number]] Collected: 12/15/23 1657    Order Status: Completed Specimen: Wound from Back Updated: 12/15/23 1900     Gram Stain Result No WBC's      No organisms seen    Narrative:      3.) 2,3 Disc space    Gram stain [[id number]] Collected: 12/15/23 1655    Order Status: Completed Specimen: Wound from Back Updated: 12/15/23 1857     Gram Stain Result No WBC's      No organisms seen    Narrative:      2.) 2,3 Disc space    Gram stain [[id number]] Collected: 12/15/23 1603    Order Status: Completed Specimen: Wound from Back Updated: 12/15/23 1855     Gram Stain Result No WBC's      No organisms seen    Narrative:      Paraspinal    Fungus culture [[id number]] Collected: 12/15/23 1655    Order Status: Sent Specimen: Wound from Back Updated: 12/15/23 1730    AFB Culture & Smear [[id number]] Collected: 12/15/23 1655    Order Status: Sent Specimen: Wound from Back Updated: 12/15/23 1730    AFB Culture & Smear [[id number]] Collected: 12/15/23 1657    Order Status: Sent Specimen: Wound from Back Updated: 12/15/23 1728    Fungus culture [[id number]] Collected: 12/15/23 1657    Order Status: Sent Specimen: Wound from Back Updated: 12/15/23 1727    Fungus culture [[id number]] Collected: 12/15/23 1603    Order Status: Sent Specimen: Wound from Back Updated: 12/15/23 1621    AFB Culture & Smear [[id number]] Collected: 12/15/23 1603    Order Status: Sent Specimen: Wound from Back Updated: 12/15/23 1620    Culture, Anaerobe [[id number]]     Order Status: No result Specimen: Bone from Back     Aerobic culture [[id number]]     Order Status: No result Specimen: Bone from Back     AFB Culture & Smear [[id number]]     Order Status: No result Specimen: Bone from Back     Gram stain [[id number]]     Order Status: No result Specimen: Bone from Back     Fungus culture [[id number]]     Order Status: No result Specimen: Bone from Back     Culture, Anaerobic [[id number]] Collected: 12/13/23 1703    Order Status: Completed  Specimen: Abscess from Buttocks, Left Updated: 12/15/23 0707     Anaerobic Culture Culture in progress    AFB Culture & Smear [[id number]] Collected: 12/13/23 1703    Order Status: Completed Specimen: Abscess from Buttocks, Left Updated: 12/14/23 2128     AFB Culture & Smear Culture in progress     AFB CULTURE STAIN No acid fast bacilli seen.    Gram stain [[id number]] Collected: 12/13/23 1703    Order Status: Completed Specimen: Abscess from Buttocks, Left Updated: 12/13/23 2104     Gram Stain Result No WBC's      No organisms seen    Fungus culture [[id number]] Collected: 12/13/23 1703    Order Status: Sent Specimen: Abscess from Buttocks, Left Updated: 12/13/23 1928          All pertinent labs from the last 24 hours have been reviewed.    Significant Diagnostics:  I have reviewed and interpreted all pertinent imaging results/findings within the past 24 hours.  Assessment/Plan:     * Acute osteomyelitis of lumbar spine  A 73M w/ hx CVA, CKD3, HTN, DM2, and COPD who presents with AMS likely 2/2 underling sepsis as well as progressive back pain and inability to ambulate for the past 2 weeks due to progressive L2-L3 osteodiscitis and presumed mechanical instability.          CTH 12/11: lacuanr infarcts   MRI w/wo L sp 12/11: severe central stenosis L4-5, discitis osteomyelitis at L2-3, L psoas abscess up to 2 cm  MRI C/T w/wo: very poor with alot of motion artifact, but no OM else where   US BLE 12/12: negative  12/10: , CRP >120  BCx NGTD  MRI Head, Csp, CTH 12/16: nondiagnostic     Recs  -admit to neuroICU, neurochecks per protocol  -all pertinent diagnostics reviewed  -f/u intraoperative cultures, continue abx  -HV to full suction  -MAP > 85  -intubate and MRI pan spine  -rest of care per primary  -please notify neurosurgery on call for any acute neurologic change    Dispo: ongoing  D/w Dr. Sergey Lorenzana MD  Neurosurgery  [facility] - Neuro Critical Care

## 2023-12-16 NOTE — HPI
"Bj Pate Jr. is a 73 year old male with a medical history significant for CKD3, HTN, DM2, COPD and a prior right thalamic infarct (2016) who is admitted to [facility] for post operative monitoring following a L1-L4 segmental posterolateral fusion, L2-L4 laminectomy and L2-L3 TLIF. [place] [facility] for evaluation of increasing lethargy, progressive encephalopathy and lower back pain. Work up revealed L2-L4 discitis/osteomyelitis and left psoas abscess. Started on Meropenem and Vancomycin per ID recommendations. S/p IR biopsy of psoas abscess and L2-L3 disc space. Cultures no growth to date. Underwent L1-L4 segmental posterolateral fusion, L2-L4 laminectomy and L2-L3 TLIF with noted purulent material noted at surgical site sent for culture.     On admission to [facility], patient with residual anesthesia effects. Oral airway in place. Opens eyes to voice and follows some basic commands in BLE (wiggles toes). No movement noted in BUE. Per documentation, prior to surgery, patient alert and oriented with GCS 15. Dysarthric and "moves all extremities spontaneously with good tone. BUE 2/5, BLE 2/5 except DF/PF/EHL 3/5."  "

## 2023-12-16 NOTE — TRANSFER OF CARE
"Anesthesia Transfer of Care Note    Patient: Bj Pate Jr.    Procedure(s) Performed: Procedure(s) (LRB):  L-1 TO L-4 FUSION, SPINE, LUMBAR, TLIF, POSTERIOR APPROACH, USING PEDICLE SCREW (N/A)    Patient location: ICU ( 4791)    Anesthesia Type: general    Transport from OR: Transported from OR on 6-10 L/min O2 by face mask with adequate spontaneous ventilation. Continuous ECG monitoring in transport. Continuous SpO2 monitoring in transport. Continuos invasive BP monitoring in transport    Post pain: adequate analgesia    Post assessment: no apparent anesthetic complications and tolerated procedure well    Post vital signs: stable    Level of consciousness: sedated    Nausea/Vomiting: no nausea/vomiting    Complications: none    Transfer of care protocol was followed      Last vitals: Visit Vitals  BP (!) 165/83   Pulse 106   Temp 36.7 °C (98.1 °F) (Temporal)   Resp 20   Ht 5' 9" (1.753 m)   Wt 119.7 kg (264 lb)   SpO2 98%   BMI 38.99 kg/m²     "

## 2023-12-16 NOTE — ASSESSMENT & PLAN NOTE
73M CKD3, HTN, DM2, COPD and a prior right thalamic infarct (2016) who is admitted to [facility] for post operative monitoring following a L1-L4 segmental posterolateral fusion, L2-L4 laminectomy and L2-L3 TLIF for L2-L4 discitis/osteomyelitis.     S/p IR biopsy of psoas abscess and L2-L3 disc space. Cultures no growth to date.    - Admit to [facility]  - Q1h neurochecks while in ICU (follow neurologic exam as anesthesia wears off)   - Q1h vitals while in ICU  - MAP>85, maintaining without intervention  - Echo reviewed with ejection fraction of 45 - 50%   - On supplemental oxygen, weaning as able  - Admit CXR unremarkable  - Continue Meropenem and Vancomycin per ID recommendations  - Follow OR cultures  - Daily CMP, Mag, Phos - replete electrolytes PRN  - NPO pending improvement in mental status  - Strict I/Os  - Daily CBC, transfuse PRN  - Start SubQ Heparin in when clinically indicated, held during acute perioperative period   - PT/OT/SLP as appropriate  - CM/SW consult for dispo planning

## 2023-12-16 NOTE — PLAN OF CARE
NeuroICU Attending    Encephalopathy  No no commands  Intubate for pan spine MRI w/wo per NSGY  Patient thrashes in scanner  EEG upon return  May need head imaging    Hever Tate MD MPH  NeuroCritical Care & Vascular Neurology

## 2023-12-16 NOTE — PLAN OF CARE
POC reviewed with Bj Pate Jr. and family at 1400. Pt unable to verbalize, family at bedside verbalized understanding. Questions and concerns addressed. No acute events today. Pt progressing toward goals. Will continue to monitor. See below and flowsheets for full assessment and VS info.     - CTA attempted this morning, pt moving around too much and was undirectable.   - EEG cap on due to plans for MRI.   - humidified oxygen placed on patient to keep mouth from drying out.   - Patient intubated and sedated for clear MRI to be completed.   - MRI pending.   - 1L LR bolus given due to MAPs dropping.   - Set rate of propofol decreased per MD orders due to MAPs dropping.   - Wrist restraints applied to keep patient from pulling ETT.       Neuro:  Jaja Coma Scale  Best Eye Response: 2-->(E2) to pain  Best Motor Response: 4-->(M4) withdraws from pain  Best Verbal Response: 1-->(V1) none  Jaja Coma Scale Score: 7  Assessment Qualifiers: patient chemically sedated or paralyzed, patient intubated  Pupil PERRLA: yes     24 hr Temp:  [94.5 °F (34.7 °C)-98.2 °F (36.8 °C)]     CV:   Rhythm: normal sinus rhythm  BP goals:   SBP < 160  MAP > 85    Resp:      Vent Mode: A/C  Set Rate: 18 BPM  Oxygen Concentration (%): 50  Vt Set: 480 mL  PEEP/CPAP: 5 cmH20    Plan: wean to extubate    GI/:     Diet/Nutrition Received: NPO  Last Bowel Movement: 12/14/23  Voiding Characteristics: urethral catheter (bladder)    Intake/Output Summary (Last 24 hours) at 12/16/2023 1647  Last data filed at 12/16/2023 1601  Gross per 24 hour   Intake 3115.15 ml   Output 1365 ml   Net 1750.15 ml     Unmeasured Output  Urine Occurrence: 1  Stool Occurrence: 1    Labs/Accuchecks:  Recent Labs   Lab 12/16/23 0141   WBC 20.06*   RBC 2.94*   HGB 8.8*   HCT 26.6*         Recent Labs   Lab 12/16/23 0141      K 4.6   CO2 24      BUN 44*   CREATININE 1.1   ALKPHOS 90   ALT 51*   AST 94*   BILITOT 0.7      Recent Labs   Lab  12/14/23  1809   INR 1.0   APTT 25.9      Recent Labs   Lab 12/12/23  2333   TROPONINI 0.063*       Electrolytes: N/A - electrolytes WDL  Accuchecks: ACHS    Gtts:   propofol 20 mcg/kg/min (12/16/23 1640)       LDA/Wounds:  Lines/Drains/Airways       Drain  Duration                  NG/OG Tube 12/14/23 0800 nasogastric Left nostril 2 days         Urethral Catheter 12/15/23 1636 Straight-tip;Silicone 16 Fr. 1 day         Closed/Suction Drain 12/15/23 1721 Tube - 1 Right Back Accordion 10 Fr. <1 day         Closed/Suction Drain 12/15/23 1722 Tube - 2 Left Back Accordion 10 Fr. <1 day              Airway  Duration                  Airway - Non-Surgical 12/16/23 1210 Endotracheal Tube <1 day              Arterial Line  Duration             Arterial Line 12/15/23 1507 Left Radial 1 day              Peripheral Intravenous Line  Duration                  Midline Catheter Insertion/Assessment  - Single Lumen 12/12/23 0215 Left basilic vein (medial side of arm) 20g x 10cm 4 days         Peripheral IV - Single Lumen 12/15/23 1540 18 G Right Other 1 day                  Wounds: Yes  Wound care consulted: Yes    Is this a stroke patient? No.      Problem: Infection  Goal: Absence of Infection Signs and Symptoms  Outcome: Ongoing, Progressing     Problem: Adult Inpatient Plan of Care  Goal: Optimal Comfort and Wellbeing  Outcome: Ongoing, Progressing     Problem: Bariatric Environmental Safety  Goal: Safety Maintained with Care  Outcome: Ongoing, Progressing     Problem: Restraint, Nonbehavioral (Nonviolent)  Goal: Absence of Harm or Injury  Outcome: Ongoing, Progressing

## 2023-12-16 NOTE — SIGNIFICANT EVENT
1200 - NCC team at bedside at this time preparing for intubation.   1205 - Etomidate 40mg given followed by Brown 100 mg, RSI. VSS.   1207 - intubated with # 8.0 ETT, 25cm at lips. + Color change noted. Bilateral breath sounds auscultated. ETT secured. Hemodynamically stable. VSS  1209 - CXR ordered.

## 2023-12-16 NOTE — HOSPITAL COURSE
12/16/2023: Went for CTA because of lethargy since surgery. Did not tolerate procedure, returned to [facility]. Sedated and intubated for MRI pan spine, as patient had post surgical changes in exam. Continuing Vanc and Deo.   12/17/2023 MRI completed last night. No further surgical plans per NSGY. Provigil given to improve wakefulness, will start daily. Extubated this afternoon, no issues.  12/18/2023: no longer requires MAP goals  12/19/2023: NAEON. PICC consulted placed for long-term IV antibiotic administration. Remains stable for transfer to floor with .  12/20/2023: NAEON. Slightly more alert and interactive today. ABG and ammonia ordered given persistent encephalopathy Lasix 40mg IV given for BUE swelling. US extremities ordered to rule out DVT. Remains stable for TTF with HM.

## 2023-12-16 NOTE — NURSING
Patient arrived to [facility] from [facility] >> [place] >> [facility]U    Report received from: Kiesha RICHARD    Type of stroke/diagnosis: Lumbar Fusion    Current symptoms: nonverbal, no movement uppers, withdraws lower extremities, pupils 2 and brisk    Skin Assessment done: Yes  Wounds noted: Dermatitis to sacrum, incision on back  *If wounds noted, was Wound Care consulted? Yes  *If wounds noted, LDA placed? Yes  Skin Assessment Verified by: BAILEE Brizuela and BAILEE Rae Completed? Failed, NGT in place    Patient Belongings on Admit: wound vac supplies, book bag, telephone    NCC notified: MD Melissa

## 2023-12-16 NOTE — SUBJECTIVE & OBJECTIVE
Past Medical History:   Diagnosis Date    Acute on chronic systolic heart failure 12/13/2023    Anemia 06/04/2021    Anticoagulant long-term use     Basal ganglia hemorrhage     Left basal ganglia hemorrhage with resultant right-sided hemiparesis which has resolved.     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      Past Surgical History:   Procedure Laterality Date    CARPAL TUNNEL RELEASE Left 2/19/2020    Procedure: RELEASE, CARPAL TUNNEL LEFT;  Surgeon: Maria Luisa Mccurdy MD;  Location: [facility] OR;  Service: Orthopedics;  Laterality: Left;    COLONOSCOPY N/A 7/28/2023    Procedure: COLONOSCOPY;  Surgeon: Jaylene Alvarado MD;  Location: [facility] ENDO;  Service: Endoscopy;  Laterality: N/A;    EPIDURAL STEROID INJECTION N/A 5/2/2019    Procedure: Injection, Steroid, Epidural Cervical;  Surgeon: Dariel Doan Jr., MD;  Location: [facility] ENDO;  Service: Pain Management;  Laterality: N/A;  Cervical Epidural Steroid Injection     [id number]    Arrive @ 1130; ASA; Check BG    EPIDURAL STEROID INJECTION Bilateral 5/29/2019    Procedure: Lumbar Medial Branch Blocks;  Surgeon: Dariel Doan Jr., MD;  Location: [facility] ENDO;  Service: Pain Management;  Laterality: Bilateral;  Bilateral Lumbar Medial Branch Blocks L3, L4, L5    [id number]  [id number]    Attive @ 1030 (11 arrival request); NO Sedation; ASA; Check BG    EPIDURAL STEROID INJECTION Bilateral 7/3/2019    Procedure: Lumbar Medial Branch Blocks;   Surgeon: Dariel Doan Jr., MD;  Location: [facility] ENDO;  Service: Pain Management;  Laterality: Bilateral;  Bilateral Lumbar Medial Branch Blocks L3, L4, L5    [id number]  [id number]    Arrive @ 0930; NO Sedation; ASA; Check BG    EPIDURAL STEROID INJECTION N/A 8/7/2019    Procedure: Injection, Steroid, Epidural Cervical;  Surgeon: Dariel Doan Jr., MD;  Location: [facility] ENDO;  Service: Pain Management;  Laterality: N/A;  Cervical Epidural Steroid Injection C7-T1    [id number]    Arrive @ 1115; Last ASA 7/30; Check BG    ESOPHAGOGASTRODUODENOSCOPY N/A 7/28/2023    Procedure: EGD (ESOPHAGOGASTRODUODENOSCOPY);  Surgeon: Jaylene Alvarado MD;  Location: [facility] ENDO;  Service: Endoscopy;  Laterality: N/A;  inst via portal    LEFT HEART CATHETERIZATION Left 9/21/2021    Procedure: Left heart cath 9am start, R rad access;  Surgeon: Dariel Conner MD;  Location: [facility] CATH LAB;  Service: Cardiology;  Laterality: Left;  RN PRE OP Covid NEGATIVE ON  9-20-21.  C A    MIDLINE CATHETER PLACEMENT  12/12/2023    NO PAST SURGERIES        No current facility-administered medications on file prior to encounter.     Current Outpatient Medications on File Prior to Encounter   Medication Sig Dispense Refill    acetaminophen (TYLENOL) 650 MG TbSR Take 1,300 mg by mouth every 6 (six) hours as needed (Pain).      ascorbic acid, vitamin C, (VITAMIN C) 500 MG tablet Take 500 mg by mouth once daily.      atorvastatin (LIPITOR) 40 MG tablet Take 1 tablet (40 mg total) by mouth once daily. 90 tablet 1    cyclobenzaprine (FLEXERIL) 10 MG tablet Take 10 mg by mouth 3 (three) times daily as needed for Muscle spasms.      docusate sodium (COLACE) 100 MG capsule Take 100 mg by mouth daily as needed for Constipation.      furosemide (LASIX) 20 MG tablet Take 1 tablet (20 mg total) by mouth 2 (two) times daily. 60 tablet 0    gabapentin (NEURONTIN) 600 MG tablet TAKE 1 TABLET(600 MG) BY MOUTH THREE TIMES DAILY AS NEEDED 90 tablet 1     HYDROcodone-acetaminophen (NORCO) 5-325 mg per tablet Take 1 tablet by mouth every 6 (six) hours as needed for Pain. 10 tablet 0    PREVIDENT 5000 SENSITIVE 1.1-5 % Pste Brush teeth every other day as directed.      tamsulosin (FLOMAX) 0.4 mg Cap Take 0.4 mg by mouth once daily.      aspirin (ECOTRIN) 81 MG EC tablet Take 81 mg by mouth once daily.      blood pressure test kit-large Kit 1 Device by Misc.(Non-Drug; Combo Route) route 2 (two) times daily. 1 each 0    blood sugar diagnostic Strp To check BG 2 times daily, to use with insurance preferred meter 200 strip 3    blood-glucose meter kit To check BG 2 times daily, to use with insurance preferred meter 1 each 0    diltiaZEM (CARDIZEM CD) 240 MG 24 hr capsule Take 1 capsule (240 mg total) by mouth once daily. (Patient not taking: Reported on 12/11/2023) 90 capsule 2    fluticasone-salmeterol diskus inhaler 500-50 mcg Inhale 1 puff into the lungs 2 (two) times daily. Controller 60 each 11    lancets [facility] To check BG 2 times daily, to use with insurance preferred meter 200 each 3    PFIZER COVID BIVAL,12Y UP,,PF, 30 mcg/0.3 mL injection       [DISCONTINUED] nystatin (MYCOSTATIN) cream APPLY   TOPICALLY TO AFFECTED AREA TWICE DAILY 60 g 0      Allergies: Tomato (solanum lycopersicum), Naproxen, and Shrimp    N/A  Family history non-contributory to current problem     Social History     Tobacco Use    Smoking status: Never    Smokeless tobacco: Never   Substance Use Topics    Alcohol use: Yes     Alcohol/week: 0.0 standard drinks of alcohol     Comment: occacionally    Drug use: No     Review of Systems   Unable to perform ROS: Mental status change     Objective:     Vitals:    Temp: (!) 94.5 °F (34.7 °C)  Pulse: 100  Rhythm: sinus tachycardia  BP: (!) 154/74  MAP (mmHg): 99  Resp: 17  SpO2: 99 %    Temp  Min: 94.5 °F (34.7 °C)  Max: 98.3 °F (36.8 °C)  Pulse  Min: 97  Max: 106  BP  Min: 109/61  Max: 165/83  MAP (mmHg)  Min: 81  Max: 120  Resp  Min: 16  Max:  20  SpO2  Min: 96 %  Max: 99 %    12/14 0701 - 12/15 0700  In: 1943.2 [I.V.:48]  Out: 2160 [Urine:2150; Drains:10]   Unmeasured Output  Urine Occurrence: 1  Stool Occurrence: 1        Physical Exam  Vitals and nursing note reviewed.   Constitutional:       General: He is not in acute distress.     Appearance: He is ill-appearing.   HENT:      Head: Normocephalic and atraumatic.      Mouth/Throat:      Comments: OPA in place  Eyes:      Pupils: Pupils are equal, round, and reactive to light.      Comments: Crosses midline towards voice   Pulmonary:      Effort: Pulmonary effort is normal.   Abdominal:      General: Abdomen is flat. There is no distension.   Neurological:      Mental Status: He is alert.      Comments:   E3V1M6  Opens eyes to verbal stimuli  Follows basic commands in BLE  No movement in BUE     Unable to test orientation, language, memory, judgment, insight, fund of knowledge, hearing, shoulder shrug, tongue protrusion, coordination, gait due to level of consciousness.     Today I personally reviewed pertinent medications, lines/drains/airways, imaging, cardiology results, laboratory results, microbiology results, notably:    Estimated Creatinine Clearance: 86.5 mL/min (based on SCr of 1 mg/dL).

## 2023-12-16 NOTE — NURSING
RN called NCC team and notified TORI Rdz of LR bolus finishing around 20 minutes ago. MAPs are sustaining around 73-76 on arterial line. NP to order levo to help maintain MAP goal of > 85. WCTM.

## 2023-12-16 NOTE — NURSING
0134 Patient transported to MRI via bed with continuous cardiac monitoring, pulse oximetry, ambu bag, O2 tank, and nurse assist x2.     0200 MRI tech communicated to RN that unable to obtain accurate scans because patient is moving. Melissa WILLS notified and verbal ordered 25mcg of fentanyl IV to help patient tolerate  scan.    0245 MRI tech communicated to RN that she is still not able to obtain accurate scan photos because patient is moving. Melissa WILLS called again and said to end scan.     0300 Patient arrived back in room. No new orders at this time. ALBARO

## 2023-12-16 NOTE — ASSESSMENT & PLAN NOTE
73M CKD3, HTN, DM2, COPD and a prior right thalamic infarct (2016) who is admitted to [facility] for post operative monitoring following a L1-L4 segmental posterolateral fusion, L2-L4 laminectomy and L2-L3 TLIF for L2-L4 discitis/osteomyelitis.     S/p IR biopsy of psoas abscess and L2-L3 disc space. Cultures no growth to date.    - Admit to [facility]  - Q1h neurochecks while in ICU (follow neurologic exam as anesthesia wears off)   - Q1h vitals while in ICU  - MAP>85, maintaining without intervention  - Echo reviewed with ejection fraction of 45 - 50%   - On supplemental oxygen, weaning as able  - Admit CXR unremarkable  - Continue Meropenem and Vancomycin per ID recommendations  - Follow OR cultures  - Daily CMP, Mag, Phos - replete electrolytes PRN  - NPO pending improvement in mental status  - Strict I/Os  - Daily CBC, transfuse PRN  - Start SubQ Heparin in when clinically indicated, held during acute perioperative period   - PT/OT/SLP as appropriate  - CM/SW consult for dispo planning    12/16: Concerns for exam change per NSGY. Follow up MRI pan spine (intubated and sedated for procedure, as previous MRI was non diagnostic due to movement). Continuing Vanc Deo. Continuing for MAPs>85.

## 2023-12-16 NOTE — BRIEF OP NOTE
[facility] - Neuro Critical Care  Brief Operative Note    SUMMARY     Surgery Date: 12/15/2023     Surgeon(s) and Role:     * Rolf Rincon MD - Primary     * Suzan Traylor MD - Resident - Assisting        Pre-op Diagnosis:  Discitis, unspecified spinal region [M46.40]    Post-op Diagnosis:  Post-Op Diagnosis Codes:     * Discitis, unspecified spinal region [M46.40]    Procedure(s) (LRB):  L-1 TO L-4 FUSION, SPINE, LUMBAR, TLIF, POSTERIOR APPROACH, USING PEDICLE SCREW (N/A)    L1-L4 segmental posterolateral fixation   L2-L4 laminectomy   L2-L3 TLIF  L1-L4 segmental posterolateral fusion        Anesthesia: General    Implants:  Implant Name Type Inv. Item Serial No.  Lot No. LRB No. Used Action   KIT MED BONE INFUSE - [id number]  KIT MED BONE INFUSE  [employer] [facility] [id number] N/A 1 Implanted   EIT T/PLIF, H 15MM, 4 DEGREE,26/9    [employer] [id number] N/A 1 Implanted   BONE 30CC CANCELLOUS CRUSHED - [id number]  BONE 30CC CANCELLOUS CRUSHED [id number] MUSCULOSKELETAL TRANSPLANT FND  N/A 1 Implanted   SCREW EXPEDIUM VERSE 5.5 6X45 - [id number]  SCREW EXPEDIUM VERSE 5.5 6X45  [employer]  N/A 4 Implanted   SET SCREW EXPEDIUM VERSE UNITZ - [id number]  SET SCREW EXPEDIUM VERSE UNITZ  [employer]  N/A 4 Implanted   SCREW BONE SPINAL 5.5 6 X 45MM - [id number]  SCREW BONE SPINAL 5.5 6 X 45MM  [employer]  N/A 4 Implanted   SCREW INNER SINGLE SET TITANIU - [id number]  SCREW INNER SINGLE SET TITANIU  [employer]  N/A 4 Implanted   CONNECTOR SPINAL SFX A5 5.5MM - [id number]  CONNECTOR SPINAL SFX A5 5.5MM  [employer]  N/A 1 Implanted   RODS    SYNTHES  N/A 2 Implanted       Operative Findings:   Purulent material sent for micro and path   Good decompression   Please see full op note for more details     Estimated Blood Loss: 500 mL           Specimens:   Specimen (24h ago, onward)       Start     Ordered    12/15/23 1724  Specimen to Pathology, Surgery Neurosurgery  Once        Comments:  Pre-op Diagnosis: Discitis, unspecified spinal region [M46.40]Procedure(s):**LOOP X**FUSION, SPINE, LUMBAR, TLIF, POSTERIOR APPROACH, USING PEDICLE SCREW Number of specimens: 2Name of specimens: 1. Facet Joint- permanent                                   2. 2, 3 Disc Space- permanent     References:    Click here for ordering Quick Tip   Question Answer Comment   Procedure Type: Neurosurgery    Specimen Class: Known or suspected malignancy    Which provider would you like to cc? NALINI BENTLEY    Release to patient Immediate        12/15/23 3758                    [id number]

## 2023-12-16 NOTE — PT/OT/SLP PROGRESS
Physical Therapy      Patient Name:  Bj Pate Jr.   MRN:  [id number]    Patient not seen today secondary to  (Pt MALIK on first attempt. No LSO in room on 2nd attempt and reaching out to [facility] for necessity og brace for eval. [facility] ok for eval with no brace. Pt intubated for MRI on 3rd attempt.). Will follow-up tomorrow as appropriate.

## 2023-12-16 NOTE — PLAN OF CARE
Chart reviewed - transferred to [facility] post-op overnight, noted to have purulent material found intra-op - cx in process, so far no growth to date. No fevers documented overnight.       Recommendations:  -continue vancomycin pharm to dose (goal trough 15-20) and meropenem pending cx data  -follow up cx    ID will continue to follow.

## 2023-12-16 NOTE — NURSING
RN called ED CT to see when we could get Mr. Brink STAT CTA ordered by NSMYCHAL. Jo Ann (CT tech) stated they were waiting on the protocol. RN left tech with spectralink and RN name and is awaiting call back. ALBARO.

## 2023-12-16 NOTE — NURSING
RN called NCC team and spoke with TORI Rdz to notify that patient has started having trouble maintaining MAP goal > 85 on his own. MAPs are anywhere from 75-80 on arterial line. NP aware and RN awaiting orders. WCTM.

## 2023-12-16 NOTE — PLAN OF CARE
[facility] Care Plan    POC reviewed with Bj Pate Jr. at 0500. Questions and concerns addressed. No acute events overnight. Will continue to monitor. See below and flowsheets for full assessment and VS info.     - See previous noted for events that occurred through shift.  - Rivera catheter in place  - MRI completed   - L hemovac drain ouput 70cc of bloody drainage   - R hemovac drain output 160cc of blood drainage   - Bath and linen change complete    Is this a stroke patient? no    Neuro:  [place] Coma Scale  Best Eye Response: 3-->(E3) to speech  Best Motor Response: 6-->(M6) obeys commands  Best Verbal Response: 4-->(V4) confused  [place] Coma Scale Score: 13  Assessment Qualifiers: patient not sedated/intubated  Pupil PERRLA: yes     24hr Temp:  [94.5 °F (34.7 °C)-98.1 °F (36.7 °C)]     CV:   Rhythm: normal sinus rhythm  BP goals:   SBP < 160  MAP > 85    Resp:           Plan: N/A    GI/:     Diet/Nutrition Received: NPO  Last Bowel Movement: 12/14/23  Voiding Characteristics: external catheter    Intake/Output Summary (Last 24 hours) at 12/16/2023 0516  Last data filed at 12/16/2023 0001  Gross per 24 hour   Intake 2888.89 ml   Output 1480 ml   Net 1408.89 ml     Unmeasured Output  Urine Occurrence: 1  Stool Occurrence: 1    Labs/Accuchecks:  Recent Labs   Lab 12/16/23  0141   WBC 20.06*   RBC 2.94*   HGB 8.8*   HCT 26.6*         Recent Labs   Lab 12/16/23  0141      K 4.6   CO2 24      BUN 44*   CREATININE 1.1   ALKPHOS 90   ALT 51*   AST 94*   BILITOT 0.7      Recent Labs   Lab 12/14/23  1809   INR 1.0   APTT 25.9      Recent Labs   Lab 12/12/23  2333   TROPONINI 0.063*       Electrolytes: N/A - electrolytes WDL  Accuchecks: ACHS    Gtts:      LDA/Wounds:  Lines/Drains/Airways       Drain  Duration                  NG/OG Tube 12/14/23 0800 nasogastric Left nostril 1 day         Closed/Suction Drain 12/15/23 1721 Tube - 1 Right Back Accordion 10 Fr. <1 day         Closed/Suction Drain  12/15/23 1722 Tube - 2 Left Back Accordion 10 Fr. <1 day         Urethral Catheter 12/15/23 1636 Straight-tip;Silicone 16 Fr. <1 day              Arterial Line  Duration             Arterial Line 12/15/23 1507 Left Radial <1 day              Peripheral Intravenous Line  Duration                  Midline Catheter Insertion/Assessment  - Single Lumen 12/12/23 0215 Left basilic vein (medial side of arm) 20g x 10cm 4 days         Peripheral IV - Single Lumen 12/15/23 1540 18 G Right Other <1 day                  Wounds: Yes  Wound care consulted: Yes

## 2023-12-16 NOTE — ASSESSMENT & PLAN NOTE
Reportedly alert and oriented prior to surgery   Follow exam   See Acute osteomyelitis of lumbar spine

## 2023-12-16 NOTE — PT/OT/SLP PROGRESS
Occupational Therapy      Patient Name:  Bj Pate Jr.   MRN:  [id number]    OT orders received. However, pt not seen today despite multiple attempts. Pt off the floor on first, no LSO brace present (messaged Fern for clarification- cleared to eval without however don for future session when arrived, OT ordered on phone at 10:45am). LSO still not in room on 3rd attempt in PM, which pt was also found intubated for MRI later this date. Will return tomorrow for official evaluation.     12/16/2023

## 2023-12-16 NOTE — PROGRESS NOTES
"[facility] - Neuro Critical Care  Neurocritical Care  Progress Note    Admit Date: 12/10/2023  Service Date: 12/16/2023  Length of Stay: 5    Subjective:     Chief Complaint: Acute osteomyelitis of lumbar spine    History of Present Illness: Bj Pate Jr. is a 73 year old male with a medical history significant for CKD3, HTN, DM2, COPD and a prior right thalamic infarct (2016) who is admitted to [facility] for post operative monitoring following a L1-L4 segmental posterolateral fusion, L2-L4 laminectomy and L2-L3 TLIF. Patient initially admitted to [facility] on 12/10 for evaluation of increasing lethargy, progressive encephalopathy and lower back pain. Work up revealed L2-L4 discitis/osteomyelitis and left psoas abscess. Started on Meropenem and Vancomycin per ID recommendations. S/p IR biopsy of psoas abscess and L2-L3 disc space. Cultures no growth to date. Underwent L1-L4 segmental posterolateral fusion, L2-L4 laminectomy and L2-L3 TLIF with noted purulent material noted at surgical site sent for culture.     On admission to [facility], patient with residual anesthesia effects. Oral airway in place. Opens eyes to voice and follows some basic commands in BLE (wiggles toes). No movement noted in BUE. Per documentation, prior to surgery, patient alert and oriented with GCS 15. Dysarthric and "moves all extremities spontaneously with good tone. BUE 2/5, BLE 2/5 except DF/PF/EHL 3/5."    Hospital Course: 12/16/2023: Went for CTA because of lethargy since surgery. Did not tolerate procedure, returned to [facility]. Sedated and intubated for MRI pan spine, as patient had post surgical changes in exam. Continuing Vanc and Deo.     Interval History:  Went for CTA because of lethargy since surgery. Did not tolerate procedure, returned to [facility]. Sedated and intubated for MRI pan spine, as patient had post surgical changes in exam. Continuing Vanc and Deo.     Review of Systems   Unable to perform ROS: Mental status change       Objective: "     Vitals:  Temp: 98.2 °F (36.8 °C)  Pulse: 104  Rhythm: sinus tachycardia  BP: 124/78  MAP (mmHg): 95  Resp: 13  SpO2: 100 %  Oxygen Concentration (%): 28    Temp  Min: 94.5 °F (34.7 °C)  Max: 98.2 °F (36.8 °C)  Pulse  Min: 92  Max: 104  BP  Min: 120/73  Max: 154/74  MAP (mmHg)  Min: 82  Max: 110  Resp  Min: 13  Max: 20  SpO2  Min: 91 %  Max: 100 %  Oxygen Concentration (%)  Min: 28  Max: 28    12/15 0701 - 12/16 0700  In: 2888.9 [I.V.:1250]  Out: 1235 [Urine:705; Drains:230]   Unmeasured Output  Urine Occurrence: 1  Stool Occurrence: 1        Physical Exam  Vitals and nursing note reviewed.   Constitutional:       General: He is not in acute distress.  HENT:      Head: Normocephalic and atraumatic.   Eyes:      Pupils: Pupils are equal, round, and reactive to light.   Cardiovascular:      Rate and Rhythm: Normal rate and regular rhythm.      Heart sounds: No murmur heard.  Pulmonary:      Effort: Pulmonary effort is normal. No respiratory distress.   Abdominal:      General: Abdomen is flat. There is no distension.      Tenderness: There is no abdominal tenderness.   Musculoskeletal:      Right lower leg: No edema.      Left lower leg: No edema.   Neurological:      Mental Status: He is alert.      Comments: E2 V1 M5  Not following commands  Localizes to noxious stimuli in BL upper extremities  Withdraws in LE  PEERL              Medications:  Continuouspropofol    Scheduledacetaminophen, 975 mg, Q8H  fentaNYL, 50 mcg, Once  fluticasone furoate-vilanteroL, 1 puff, Daily  gabapentin, 600 mg, TID  meropenem (MERREM) IVPB, 2 g, Q8H  methocarbamol (ROBAXIN) IVPB, 500 mg, Q6H  polyethylene glycol, 17 g, BID  senna-docusate 8.6-50 mg, 1 tablet, Daily  vancomycin (VANCOCIN) IV (PEDS and ADULTS), 1,750 mg, Q24H    PRNaluminum-magnesium hydroxide-simethicone, 30 mL, QID PRN  dextrose 10%, 12.5 g, PRN  dextrose 10%, 25 g, PRN  diphenhydrAMINE, 25 mg, Q6H PRN  fentaNYL, 25 mcg, Q5 Min PRN  glucagon (human recombinant), 1  mg, PRN  glucose, 16 g, PRN  glucose, 24 g, PRN  HYDROmorphone, 0.2 mg, Q5 Min PRN  insulin aspart U-100, 0-5 Units, Q6H PRN  levalbuterol, 0.63 mg, Q6H PRN  melatonin, 6 mg, Nightly PRN  morphine, 2 mg, Q4H PRN  naloxone, 0.02 mg, PRN  ondansetron, 4 mg, Q8H PRN  PHENYLephrine, 100 mcg, Once PRN  sodium chloride 0.9%, 10 mL, Q12H PRN  sodium chloride 0.9%, 3 mL, PRN  vancomycin - pharmacy to dose, , pharmacy to manage frequency      Today I personally reviewed pertinent medications, lines/drains/airways, imaging, cardiology results, laboratory results, microbiology results, notably:    Diet  No diet orders on file  No diet orders on file        Assessment/Plan:     Neuro  Encephalopathy acute  See Acute osteomyelitis of lumbar spine     AMS (altered mental status)  Reportedly alert and oriented prior to surgery   Follow exam   See Acute osteomyelitis of lumbar spine     History of CVA (cerebrovascular accident)  R thalamic, 2016  See Acute osteomyelitis of lumbar spine     Cardiac/Vascular  Acute on chronic systolic heart failure  EF 45-50%  See Acute osteomyelitis of lumbar spine     Renal/  Urinary retention  Maintain bryant per neurosurgery request  See Acute osteomyelitis of lumbar spine     ID  * Acute osteomyelitis of lumbar spine  73M CKD3, HTN, DM2, COPD and a prior right thalamic infarct (2016) who is admitted to [facility] for post operative monitoring following a L1-L4 segmental posterolateral fusion, L2-L4 laminectomy and L2-L3 TLIF for L2-L4 discitis/osteomyelitis.     S/p IR biopsy of psoas abscess and L2-L3 disc space. Cultures no growth to date.    - Admit to NCC  - Q1h neurochecks while in ICU (follow neurologic exam as anesthesia wears off)   - Q1h vitals while in ICU  - MAP>85, maintaining without intervention  - Echo reviewed with ejection fraction of 45 - 50%   - On supplemental oxygen, weaning as able  - Admit CXR unremarkable  - Continue Meropenem and Vancomycin per ID recommendations  - Follow OR  cultures  - Daily CMP, Mag, Phos - replete electrolytes PRN  - NPO pending improvement in mental status  - Strict I/Os  - Daily CBC, transfuse PRN  - Start SubQ Heparin in when clinically indicated, held during acute perioperative period   - PT/OT/SLP as appropriate  - CM/SW consult for dispo planning    12/16: Concerns for exam change per NSGY. Follow up MRI pan spine (intubated and sedated for procedure, as previous MRI was non diagnostic due to movement). Continuing Vanc Deo. Continuing for MAPs>85.    Osteomyelitis  ID following  See Acute osteomyelitis of lumbar spine     Discitis  ID following  See Acute osteomyelitis of lumbar spine     Severe sepsis  ID following  See Acute osteomyelitis of lumbar spine     Endocrine  Malnutrition  See Acute osteomyelitis of lumbar spine     Morbid obesity with BMI of 45.0-49.9, adult  See Acute osteomyelitis of lumbar spine     GI  Psoas muscle abscess  S/p IR biopsy  See Acute osteomyelitis of lumbar spine     Orthopedic  Back pain  Multimodal pain regimen   See Acute osteomyelitis of lumbar spine           The patient is being Prophylaxed for:  Venous Thromboembolism with: Mechanical  Stress Ulcer with: None  Ventilator Pneumonia with: chlorhexidine oral care    Activity Orders            Elevate HOB Elevate (30-45 degrees) Elevate HOB to 30 - 45 degrees during feeding unless otherwise stated starting at 12/13 1828    Turn patient every 2 hours starting at 12/12 0400    Progressive Mobility Protocol (mobilize patient to their highest level of functioning at least twice daily) starting at 12/11 0800          Full Code    Charlie Silva MD  Neurocritical Care  [facility] - Neuro Critical Care

## 2023-12-16 NOTE — NURSING
2100: MD Melissa notified of patient still being lethargic and mute. Patient still wiggling toes to command on BLE and minimally withdrawing on BUE.Opens eyes to voice but will not stay awake.  No new orders [facility]    2200 Melissa WILLS notified of unchanged neuro exam, concerning for inconsistency of baseline exam. MD notified NSGY about patient's exam.       2300 Neurosurgery called to bedside to assess patient due to conflicting neuro exams and unclear neuro baseline preop. Kenyatta WILLS and Patricia WILLS at bedside to assess patient; NSGY exam consistent with post op assessment. MRI STAT order placed. MRI tech Mary Grace communicated to RN that there were multiple stat scans ahead of this patient. MRI tech to call back RN when scan is able to be completed.

## 2023-12-16 NOTE — NURSING
RN called NCC team and notified them of patient sudden BP drop to 70/36 with a MAP of (46). Cuff and arterial line correlating.     16:30 MD Charlie at bedside. Verbal orders to decrease propofol rate to 20 mcg/kg/min and start 1 L LR bolus.

## 2023-12-17 LAB
ALBUMIN SERPL BCP-MCNC: 1.5 G/DL (ref 3.5–5.2)
ALLENS TEST: ABNORMAL
ALP SERPL-CCNC: 79 U/L (ref 55–135)
ALT SERPL W/O P-5'-P-CCNC: 42 U/L (ref 10–44)
ANION GAP SERPL CALC-SCNC: 7 MMOL/L (ref 8–16)
ANISOCYTOSIS BLD QL SMEAR: SLIGHT
AST SERPL-CCNC: 63 U/L (ref 10–40)
BACTERIA BLD CULT: NORMAL
BACTERIA BLD CULT: NORMAL
BASOPHILS # BLD AUTO: ABNORMAL K/UL (ref 0–0.2)
BASOPHILS NFR BLD: 0 % (ref 0–1.9)
BILIRUB SERPL-MCNC: 0.6 MG/DL (ref 0.1–1)
BUN SERPL-MCNC: 43 MG/DL (ref 8–23)
BURR CELLS BLD QL SMEAR: ABNORMAL
CALCIUM SERPL-MCNC: 9.1 MG/DL (ref 8.7–10.5)
CHLORIDE SERPL-SCNC: 104 MMOL/L (ref 95–110)
CO2 SERPL-SCNC: 24 MMOL/L (ref 23–29)
CREAT SERPL-MCNC: 1.1 MG/DL (ref 0.5–1.4)
DELSYS: ABNORMAL
DIFFERENTIAL METHOD BLD: ABNORMAL
EOSINOPHIL # BLD AUTO: ABNORMAL K/UL (ref 0–0.5)
EOSINOPHIL NFR BLD: 0 % (ref 0–8)
ERYTHROCYTE [DISTWIDTH] IN BLOOD BY AUTOMATED COUNT: 14.5 % (ref 11.5–14.5)
ERYTHROCYTE [SEDIMENTATION RATE] IN BLOOD BY WESTERGREN METHOD: 18 MM/H
EST. GFR  (NO RACE VARIABLE): >60 ML/MIN/1.73 M^2
FIO2: 50
GIANT PLATELETS BLD QL SMEAR: PRESENT
GLUCOSE SERPL-MCNC: 149 MG/DL (ref 70–110)
HCO3 UR-SCNC: 27.3 MMOL/L (ref 24–28)
HCT VFR BLD AUTO: 24.2 % (ref 40–54)
HCT VFR BLD CALC: 25 %PCV (ref 36–54)
HGB BLD-MCNC: 8.2 G/DL (ref 14–18)
HYPOCHROMIA BLD QL SMEAR: ABNORMAL
IMM GRANULOCYTES # BLD AUTO: ABNORMAL K/UL (ref 0–0.04)
IMM GRANULOCYTES NFR BLD AUTO: ABNORMAL % (ref 0–0.5)
LYMPHOCYTES # BLD AUTO: ABNORMAL K/UL (ref 1–4.8)
LYMPHOCYTES NFR BLD: 8 % (ref 18–48)
MAGNESIUM SERPL-MCNC: 2 MG/DL (ref 1.6–2.6)
MCH RBC QN AUTO: 30.3 PG (ref 27–31)
MCHC RBC AUTO-ENTMCNC: 33.9 G/DL (ref 32–36)
MCV RBC AUTO: 89 FL (ref 82–98)
METAMYELOCYTES NFR BLD MANUAL: 3 %
MODE: ABNORMAL
MONOCYTES # BLD AUTO: ABNORMAL K/UL (ref 0.3–1)
MONOCYTES NFR BLD: 11 % (ref 4–15)
MYELOCYTES NFR BLD MANUAL: 3 %
NEUTROPHILS NFR BLD: 71 % (ref 38–73)
NEUTS BAND NFR BLD MANUAL: 3 %
NRBC BLD-RTO: 0 /100 WBC
OVALOCYTES BLD QL SMEAR: ABNORMAL
PCO2 BLDA: 32.3 MMHG (ref 35–45)
PEEP: 5
PH SMN: 7.54 [PH] (ref 7.35–7.45)
PHOSPHATE SERPL-MCNC: 2.4 MG/DL (ref 2.7–4.5)
PHOSPHATE SERPL-MCNC: 3.1 MG/DL (ref 2.7–4.5)
PLATELET # BLD AUTO: 394 K/UL (ref 150–450)
PLATELET BLD QL SMEAR: ABNORMAL
PMV BLD AUTO: 10.7 FL (ref 9.2–12.9)
PO2 BLDA: 193 MMHG (ref 80–100)
POC BE: 5 MMOL/L
POC SATURATED O2: 100 % (ref 95–100)
POC TCO2: 28 MMOL/L (ref 23–27)
POCT GLUCOSE: 142 MG/DL (ref 70–110)
POCT GLUCOSE: 151 MG/DL (ref 70–110)
POCT GLUCOSE: 154 MG/DL (ref 70–110)
POCT GLUCOSE: 160 MG/DL (ref 70–110)
POIKILOCYTOSIS BLD QL SMEAR: SLIGHT
POLYCHROMASIA BLD QL SMEAR: ABNORMAL
POTASSIUM SERPL-SCNC: 4.2 MMOL/L (ref 3.5–5.1)
PROMYELOCYTES NFR BLD MANUAL: 1 %
PROT SERPL-MCNC: 6.2 G/DL (ref 6–8.4)
RBC # BLD AUTO: 2.71 M/UL (ref 4.6–6.2)
SAMPLE: ABNORMAL
SCHISTOCYTES BLD QL SMEAR: ABNORMAL
SITE: ABNORMAL
SMUDGE CELLS BLD QL SMEAR: PRESENT
SODIUM SERPL-SCNC: 135 MMOL/L (ref 136–145)
VANCOMYCIN TROUGH SERPL-MCNC: 19.9 UG/ML (ref 10–22)
VT: 480
WBC # BLD AUTO: 20.15 K/UL (ref 3.9–12.7)

## 2023-12-17 PROCEDURE — 11000001 HC ACUTE MED/SURG PRIVATE ROOM

## 2023-12-17 PROCEDURE — 25000003 PHARM REV CODE 250: Performed by: REGISTERED NURSE

## 2023-12-17 PROCEDURE — 85007 BL SMEAR W/DIFF WBC COUNT: CPT

## 2023-12-17 PROCEDURE — 99900026 HC AIRWAY MAINTENANCE (STAT)

## 2023-12-17 PROCEDURE — 63600175 PHARM REV CODE 636 W HCPCS: Performed by: STUDENT IN AN ORGANIZED HEALTH CARE EDUCATION/TRAINING PROGRAM

## 2023-12-17 PROCEDURE — 99233 SBSQ HOSP IP/OBS HIGH 50: CPT | Mod: FS,,, | Performed by: PSYCHIATRY & NEUROLOGY

## 2023-12-17 PROCEDURE — 99291 CRITICAL CARE FIRST HOUR: CPT | Mod: ,,, | Performed by: STUDENT IN AN ORGANIZED HEALTH CARE EDUCATION/TRAINING PROGRAM

## 2023-12-17 PROCEDURE — 99900035 HC TECH TIME PER 15 MIN (STAT)

## 2023-12-17 PROCEDURE — 20000000 HC ICU ROOM

## 2023-12-17 PROCEDURE — 25000003 PHARM REV CODE 250: Performed by: STUDENT IN AN ORGANIZED HEALTH CARE EDUCATION/TRAINING PROGRAM

## 2023-12-17 PROCEDURE — 51798 US URINE CAPACITY MEASURE: CPT

## 2023-12-17 PROCEDURE — 80202 ASSAY OF VANCOMYCIN: CPT | Performed by: NEUROLOGICAL SURGERY

## 2023-12-17 PROCEDURE — 27100171 HC OXYGEN HIGH FLOW UP TO 24 HOURS

## 2023-12-17 PROCEDURE — 84100 ASSAY OF PHOSPHORUS: CPT | Mod: 91 | Performed by: NEUROLOGICAL SURGERY

## 2023-12-17 PROCEDURE — A9585 GADOBUTROL INJECTION: HCPCS | Performed by: NEUROLOGICAL SURGERY

## 2023-12-17 PROCEDURE — 82803 BLOOD GASES ANY COMBINATION: CPT

## 2023-12-17 PROCEDURE — 63600175 PHARM REV CODE 636 W HCPCS

## 2023-12-17 PROCEDURE — 25000003 PHARM REV CODE 250

## 2023-12-17 PROCEDURE — 94010 BREATHING CAPACITY TEST: CPT

## 2023-12-17 PROCEDURE — 80053 COMPREHEN METABOLIC PANEL: CPT

## 2023-12-17 PROCEDURE — 94761 N-INVAS EAR/PLS OXIMETRY MLT: CPT

## 2023-12-17 PROCEDURE — 25000003 PHARM REV CODE 250: Performed by: PHYSICIAN ASSISTANT

## 2023-12-17 PROCEDURE — 83735 ASSAY OF MAGNESIUM: CPT

## 2023-12-17 PROCEDURE — 94150 VITAL CAPACITY TEST: CPT

## 2023-12-17 PROCEDURE — 63600175 PHARM REV CODE 636 W HCPCS: Performed by: PHYSICIAN ASSISTANT

## 2023-12-17 PROCEDURE — 37799 UNLISTED PX VASCULAR SURGERY: CPT

## 2023-12-17 PROCEDURE — 84100 ASSAY OF PHOSPHORUS: CPT

## 2023-12-17 PROCEDURE — 25500020 PHARM REV CODE 255: Performed by: NEUROLOGICAL SURGERY

## 2023-12-17 PROCEDURE — 94003 VENT MGMT INPAT SUBQ DAY: CPT

## 2023-12-17 PROCEDURE — 85027 COMPLETE CBC AUTOMATED: CPT

## 2023-12-17 RX ORDER — MODAFINIL 100 MG/1
200 TABLET ORAL
Status: DISCONTINUED | OUTPATIENT
Start: 2023-12-18 | End: 2023-12-29 | Stop reason: HOSPADM

## 2023-12-17 RX ORDER — MODAFINIL 100 MG/1
100 TABLET ORAL ONCE
Status: COMPLETED | OUTPATIENT
Start: 2023-12-17 | End: 2023-12-17

## 2023-12-17 RX ORDER — NOREPINEPHRINE BITARTRATE/D5W 4MG/250ML
0-.5 PLASTIC BAG, INJECTION (ML) INTRAVENOUS CONTINUOUS
Status: DISCONTINUED | OUTPATIENT
Start: 2023-12-17 | End: 2023-12-17

## 2023-12-17 RX ORDER — LANOLIN ALCOHOL/MO/W.PET/CERES
800 CREAM (GRAM) TOPICAL
Status: DISCONTINUED | OUTPATIENT
Start: 2023-12-17 | End: 2023-12-21

## 2023-12-17 RX ORDER — SODIUM,POTASSIUM PHOSPHATES 280-250MG
2 POWDER IN PACKET (EA) ORAL
Status: DISCONTINUED | OUTPATIENT
Start: 2023-12-17 | End: 2023-12-21

## 2023-12-17 RX ORDER — NOREPINEPHRINE BITARTRATE/D5W 4MG/250ML
0-.2 PLASTIC BAG, INJECTION (ML) INTRAVENOUS CONTINUOUS
Status: DISPENSED | OUTPATIENT
Start: 2023-12-17 | End: 2023-12-18

## 2023-12-17 RX ORDER — GABAPENTIN 100 MG/1
200 CAPSULE ORAL 3 TIMES DAILY
Status: DISCONTINUED | OUTPATIENT
Start: 2023-12-17 | End: 2023-12-22

## 2023-12-17 RX ORDER — ACETAMINOPHEN 500 MG
1000 TABLET ORAL EVERY 8 HOURS
Status: DISCONTINUED | OUTPATIENT
Start: 2023-12-17 | End: 2023-12-18

## 2023-12-17 RX ADMIN — POTASSIUM & SODIUM PHOSPHATES POWDER PACK 280-160-250 MG 2 PACKET: 280-160-250 PACK at 10:12

## 2023-12-17 RX ADMIN — MODAFINIL 100 MG: 100 TABLET ORAL at 12:12

## 2023-12-17 RX ADMIN — VANCOMYCIN HYDROCHLORIDE 1750 MG: 500 INJECTION, POWDER, LYOPHILIZED, FOR SOLUTION INTRAVENOUS at 10:12

## 2023-12-17 RX ADMIN — POLYETHYLENE GLYCOL 3350 17 G: 17 POWDER, FOR SOLUTION ORAL at 08:12

## 2023-12-17 RX ADMIN — NOREPINEPHRINE BITARTRATE 0.08 MCG/KG/MIN: 4 INJECTION, SOLUTION INTRAVENOUS at 04:12

## 2023-12-17 RX ADMIN — GADOBUTROL 10 ML: 604.72 INJECTION INTRAVENOUS at 12:12

## 2023-12-17 RX ADMIN — MEROPENEM 2 G: 1 INJECTION INTRAVENOUS at 01:12

## 2023-12-17 RX ADMIN — ACETAMINOPHEN 1000 MG: 500 TABLET ORAL at 09:12

## 2023-12-17 RX ADMIN — GABAPENTIN 200 MG: 100 CAPSULE ORAL at 02:12

## 2023-12-17 RX ADMIN — MEROPENEM 2 G: 1 INJECTION INTRAVENOUS at 04:12

## 2023-12-17 RX ADMIN — POTASSIUM & SODIUM PHOSPHATES POWDER PACK 280-160-250 MG 2 PACKET: 280-160-250 PACK at 05:12

## 2023-12-17 RX ADMIN — GABAPENTIN 200 MG: 100 CAPSULE ORAL at 09:12

## 2023-12-17 RX ADMIN — METHOCARBAMOL 500 MG: 100 INJECTION, SOLUTION INTRAMUSCULAR; INTRAVENOUS at 02:12

## 2023-12-17 RX ADMIN — MIDAZOLAM 1 MG: 1 INJECTION INTRAMUSCULAR; INTRAVENOUS at 01:12

## 2023-12-17 RX ADMIN — SENNOSIDES AND DOCUSATE SODIUM 1 TABLET: 8.6; 5 TABLET ORAL at 08:12

## 2023-12-17 RX ADMIN — METHOCARBAMOL 500 MG: 100 INJECTION, SOLUTION INTRAMUSCULAR; INTRAVENOUS at 12:12

## 2023-12-17 RX ADMIN — NOREPINEPHRINE BITARTRATE 0.02 MCG/KG/MIN: 4 INJECTION, SOLUTION INTRAVENOUS at 09:12

## 2023-12-17 RX ADMIN — NOREPINEPHRINE BITARTRATE 0.05 MCG/KG/MIN: 4 INJECTION, SOLUTION INTRAVENOUS at 12:12

## 2023-12-17 RX ADMIN — MUPIROCIN: 20 OINTMENT TOPICAL at 09:12

## 2023-12-17 RX ADMIN — ACETAMINOPHEN 975 MG: 325 SUPPOSITORY RECTAL at 02:12

## 2023-12-17 RX ADMIN — METHOCARBAMOL 500 MG: 100 INJECTION, SOLUTION INTRAMUSCULAR; INTRAVENOUS at 05:12

## 2023-12-17 RX ADMIN — GABAPENTIN 600 MG: 300 CAPSULE ORAL at 08:12

## 2023-12-17 RX ADMIN — MEROPENEM 2 G: 1 INJECTION INTRAVENOUS at 09:12

## 2023-12-17 RX ADMIN — ACETAMINOPHEN 975 MG: 325 SUPPOSITORY RECTAL at 05:12

## 2023-12-17 RX ADMIN — PROPOFOL 20 MCG/KG/MIN: 10 INJECTION, EMULSION INTRAVENOUS at 05:12

## 2023-12-17 RX ADMIN — MUPIROCIN: 20 OINTMENT TOPICAL at 08:12

## 2023-12-17 NOTE — PROGRESS NOTES
[facility] - Neuro Critical Care  Infectious Disease  Progress Note    Patient Name: Bj Pate Jr.  MRN: [id number]  Admission Date: 12/10/2023  Length of Stay: 6 days  Attending Physician: Rolf Rincon MD  Primary Care Provider: Jose Antonio Foster MD    Isolation Status: No active isolations  Assessment/Plan:      ID  * Acute osteomyelitis of lumbar spine  74 yo male with PMH of CVA, CKD3, HTN, DM2, COPD admitted with increasing lethargy s/p fall found to have L psoas abscess/lumbar OM. Hospital course notable for IR aspiration of L2/3 disc space (cx so far ngtd) and washout with lumbar fusion/TLIF/laminectomy with NSGY on 12/15 - post-op notable for lethargy with transfer to ICU, intubated and on pressors. Op note with purulent material, cx and path in process, so far no growth to date. Post-op MRI in process. Blood cx have remained ngtd.      Recommendations:  - continue empiric vancomycin pharm to dose and meropenem pending cx data  - follow up cx/path  - follow up repeat MRI  -follow up full op note      Thank you for your consult. I will follow-up with patient. Please contact us if you have any additional questions. Above d/w primary team.       Critical care time: 35 minutes   I personally spent critical care time on the following: evaluating this patient's organ dysfunction, development of treatment plan, discussing treatment plan with patient or surrogate and bedside caregivers, discussions with critical care service and/or consultants, evaluation of patient's response to treatment, physical examination of patient, ordering and review of treatments interventions, laboratory studies, and radiographic studies, re-evaluation of patient's condition. This critical care time did not overlap with that of any other provider of the same specialty or involve time for procedures.       Alisa Hays MD  Infectious Disease  [facility] - [facility] Critical Care    Subjective:     Principal Problem:Acute osteomyelitis  of lumbar spine    HPI: Mr. Pate is a 72 yo male with PMH of CVA, CKD3, HTN, DM2, COPD who presents with increasing lethargy and confusion.     Pt remains altered during assessment, history gathered per family at bedside and chart review. Per chart review, pt reported ~3 weeks of low back pain, as well as a fall leading up to presentation. Following the fall, pt reported lower extremity weakness and urinary retention. Daughter at bedside states that pt has not had any surgical procedures or injections recently, but was treated with pain management in 2019 and had lumbar and cervical injections. Pt is retired, but was a  previously. Family denied him having pets, denied adventurous hobbies, denied raw food intake. Daughter reported recent ER admission for abdominal pain. States pt required medication for a parasite that improved his symptoms. Upon chart review, appears pt presented in August with abdominal pain, CT AP was negative for acute findings. Pt was discharged and followed up with PCP shortly after and stated his symptoms had improved.     On admission, pt was febrile to 102, and tachycardic, with increasing leukocytosis, 13->16k, stable anemia, elevated sed/CRP: >120, 375, procal 1.37. blood cultures negative on admission. MRI lumbar spine noting discitis/osteomyelitis at L2-3 with left psoas abscess (2.2cm), as well as early osteo L3-4, noting difficult study and epidural abscess was difficult to exclude. CT head without evidence of superimposed acute intracranial process. MRI brain, cervical spine, and thoracic spine pending.     NSGY following and recommended IR consult for potential psoas abscess drainage and culture. Planning L2-pelvis decompression and fusion on 12/15/23.  IR planning CT guided aspiration/drainage of psoas abscess on either 12/12 or 12/13.     Pt is currently on vanc and cefepime. ID was consulted for antibiotic recs.       Interval History:     S/p intubation yesterday  for scan, remains on pressors.     Review of Systems   Unable to perform ROS: Intubated     Objective:     Vital Signs (Most Recent):  Temp: 98 °F (36.7 °C) (12/15/23 0749)  Pulse: 101 (12/15/23 0749)  Resp: 16 (12/15/23 0749)  BP: 109/61 (12/15/23 0749)  SpO2: 98 % (12/15/23 0749) Vital Signs (24h Range):  Temp:  [97.5 °F (36.4 °C)-98.9 °F (37.2 °C)] 98 °F (36.7 °C)  Pulse:  [] 101  Resp:  [16-20] 16  SpO2:  [94 %-98 %] 98 %  BP: (109-172)/(61-97) 109/61     Weight: 120.2 kg (264 lb 15.9 oz)  Body mass index is 39.13 kg/m².    Estimated Creatinine Clearance: 86.7 mL/min (based on SCr of 1 mg/dL).     Physical Exam  Constitutional:       General: He is not in acute distress.  HENT:      Head:      Comments: EEG cap     Mouth/Throat:      Comments: ETT  Pulmonary:      Effort: Pulmonary effort is normal. No respiratory distress.   Abdominal:      General: There is no distension.      Palpations: Abdomen is soft.      Tenderness: There is no abdominal tenderness.   Genitourinary:     Comments: bryant  Musculoskeletal:      Right lower leg: No edema.      Left lower leg: No edema.   Skin:     General: Skin is warm and dry.   Neurological:      Mental Status: He is disoriented.          Significant Labs:   Microbiology Results (last 7 days)       Procedure Component Value Units Date/Time    Blood culture [[id number]] Collected: 12/12/23 0033    Order Status: Completed Specimen: Blood Updated: 12/17/23 0612     Blood Culture, Routine No growth after 5 days.    Blood culture [[id number]] Collected: 12/12/23 0033    Order Status: Completed Specimen: Blood Updated: 12/17/23 0612     Blood Culture, Routine No growth after 5 days.    AFB Culture & Smear [[id number]] Collected: 12/15/23 1603    Order Status: Completed Specimen: Wound from Back Updated: 12/16/23 2127     AFB Culture & Smear Culture in progress    Narrative:      Paraspinal    AFB Culture & Smear [[id number]] Collected: 12/15/23 4166    Order Status:  Completed Specimen: Wound from Back Updated: 12/16/23 2127     AFB Culture & Smear Culture in progress    Narrative:      2.) 2,3 Disc space    AFB Culture & Smear [[id number]] Collected: 12/15/23 1657    Order Status: Completed Specimen: Wound from Back Updated: 12/16/23 2127     AFB Culture & Smear Culture in progress    Narrative:      3.) 2,3 Disc space    Aerobic culture [[id number]] Collected: 12/13/23 1703    Order Status: Completed Specimen: Abscess from Buttocks, Left Updated: 12/16/23 1109     Aerobic Bacterial Culture No growth    Aerobic culture [[id number]] Collected: 12/15/23 1603    Order Status: Completed Specimen: Wound from Back Updated: 12/16/23 0718     Aerobic Bacterial Culture No growth    Narrative:      Paraspinal    Aerobic culture [[id number]] Collected: 12/15/23 1655    Order Status: Completed Specimen: Wound from Back Updated: 12/16/23 0718     Aerobic Bacterial Culture No growth    Narrative:      2.) 2,3 Disc space    Culture, Anaerobe [[id number]] Collected: 12/15/23 1655    Order Status: Completed Specimen: Wound from Back Updated: 12/16/23 0710     Anaerobic Culture Culture in progress    Narrative:      2.) 2,3 Disc space    Culture, Anaerobe [[id number]] Collected: 12/15/23 1657    Order Status: Completed Specimen: Wound from Back Updated: 12/16/23 0710     Anaerobic Culture Culture in progress    Narrative:      3.) 2,3 Disc space    Culture, Anaerobe [[id number]] Collected: 12/15/23 1603    Order Status: Completed Specimen: Wound from Back Updated: 12/16/23 0710     Anaerobic Culture Culture in progress    Narrative:      Paraspinal    Aerobic culture [[id number]] Collected: 12/15/23 1657    Order Status: Completed Specimen: Wound from Back Updated: 12/16/23 0702     Aerobic Bacterial Culture No growth    Narrative:      3.) 2,3 Disc space    Blood culture x two cultures. Draw prior to antibiotics. [[id number]] Collected: 12/10/23 2007    Order Status: Completed Specimen: Blood  from Peripheral, Antecubital, Left Updated: 12/15/23 2212     Blood Culture, Routine No growth after 5 days.    Narrative:      Aerobic and anaerobic    Blood culture x two cultures. Draw prior to antibiotics. [[id number]] Collected: 12/10/23 2007    Order Status: Completed Specimen: Blood from Peripheral, Hand, Left Updated: 12/15/23 2212     Blood Culture, Routine No growth after 5 days.    Narrative:      Aerobic and anaerobic    Gram stain [[id number]] Collected: 12/15/23 1657    Order Status: Completed Specimen: Wound from Back Updated: 12/15/23 1900     Gram Stain Result No WBC's      No organisms seen    Narrative:      3.) 2,3 Disc space    Gram stain [[id number]] Collected: 12/15/23 1655    Order Status: Completed Specimen: Wound from Back Updated: 12/15/23 1857     Gram Stain Result No WBC's      No organisms seen    Narrative:      2.) 2,3 Disc space    Gram stain [[id number]] Collected: 12/15/23 1603    Order Status: Completed Specimen: Wound from Back Updated: 12/15/23 1855     Gram Stain Result No WBC's      No organisms seen    Narrative:      Paraspinal    Fungus culture [[id number]] Collected: 12/15/23 1655    Order Status: Sent Specimen: Wound from Back Updated: 12/15/23 1730    Fungus culture [[id number]] Collected: 12/15/23 1657    Order Status: Sent Specimen: Wound from Back Updated: 12/15/23 1727    Fungus culture [[id number]] Collected: 12/15/23 1603    Order Status: Sent Specimen: Wound from Back Updated: 12/15/23 1621    Culture, Anaerobe [[id number]]     Order Status: No result Specimen: Bone from Back     Aerobic culture [[id number]]     Order Status: No result Specimen: Bone from Back     AFB Culture & Smear [[id number]]     Order Status: No result Specimen: Bone from Back     Gram stain [[id number]]     Order Status: No result Specimen: Bone from Back     Fungus culture [[id number]]     Order Status: No result Specimen: Bone from Back     Culture, Anaerobic [[id number]] Collected:  12/13/23 1703    Order Status: Completed Specimen: Abscess from Buttocks, Left Updated: 12/15/23 0707     Anaerobic Culture Culture in progress    AFB Culture & Smear [[id number]] Collected: 12/13/23 1703    Order Status: Completed Specimen: Abscess from Buttocks, Left Updated: 12/14/23 2128     AFB Culture & Smear Culture in progress     AFB CULTURE STAIN No acid fast bacilli seen.    Gram stain [[id number]] Collected: 12/13/23 1703    Order Status: Completed Specimen: Abscess from Buttocks, Left Updated: 12/13/23 2104     Gram Stain Result No WBC's      No organisms seen    Fungus culture [[id number]] Collected: 12/13/23 1703    Order Status: Sent Specimen: Abscess from Buttocks, Left Updated: 12/13/23 1928          Pathology Results  (Last 10 years)                 07/28/23 1133  Specimen to Pathology, Surgery Gastrointestinal tract Final result    Narrative:  Pre-op Diagnosis: Diarrhea, unspecified type [R19.7]   Abnormal finding on GI tract imaging [R93.3]   Procedure(s):   EGD (ESOPHAGOGASTRODUODENOSCOPY)   COLONOSCOPY   Jar #1: Transverse colon polyp x1   Which provider would you like to cc?->HELEN AWAD   Release to patient->Immediate   Specimen total (fresh, frozen, permanent):->1               Significant Imaging: I have reviewed all pertinent imaging results/findings within the past 24 hours.

## 2023-12-17 NOTE — PROCEDURES
[facility] EEG/VIDEO MONITORING REPORT  Bj Pate Jr.  [id number]  1950    DATE OF SERVICE: 12/17/23  DATE OF ADMISSION: 12/10/2023  7:36 PM    ADMITTING/REQUESTING PROVIDER: Rolf Rincon MD    REASON FOR CONSULT: 74 yo male admitted for osteomyelitis of the lumbar spine with encephalopathy.    METHODOLOGY   Electroencephalographic (EEG) recording is with electrodes placed according to the International 10-20 placement system.  Thirty two (32) channels of digital signal (sampling rate of 512/sec) including T1 and T2 was simultaneously recorded from the scalp and may include  EKG, EMG, and/or eye monitors.  Recording band pass was 0.1 to 512 hz.  Digital video recording of the patient is simultaneously recorded with the EEG.  The patient is instructed report clinical symptoms which may occur during the recording session.  EEG and video recording is stored and archived in digital format.  Activation procedures which include photic stimulation, hyperventilation and instructing patients to perform simple task are done in selected patients.   The EEG is displayed on a monitor screen and can be reviewed using different montages.  Computer assisted analysis is employed to detect spike and electrographic seizure activity.   The entire record is submitted for computer analysis.  The entire recording is visually reviewed and the times identified by computer analysis as being spikes or seizures are reviewed again.  Compresses spectral analysis (CSA) is also performed on the activity recorded from each individual channel.  This is displayed as a power display of frequencies from 0 to 30 Hz over time.   The CSA is reviewed looking for asymmetries in power between homologous areas of the scalp and then compared with the original EEG recording.     [employer] software is also utilized in the review of this study.  This software suite analyzes the EEG recording in multiple domains.  Coherence and rhythmicity is computed to identify  EEG sections which may contain organized seizures.  Each channel undergoes analysis to detect presence of spike and sharp waves which have special and morphological characteristic of epileptic activity.  The routine EEG recording is converted from spacial into frequency domain.  This is then displayed comparing homologous areas to identify areas of significant asymmetry.  Algorithm to identify non-cortically generated artifact is used to separate eye movement, EMG and other artifact from the EEG.      RECORDING TIMES  Start on 12/16/23 at 11:13  Stop on 12/17/23 at 07:00  Start on 12/17/23 at 07:00 - 1 hour 48 mins  Stop 12/17/23 at 08:48  A total of 21 hours and 30 minutes of EEG recording is obtained.    EEG FINDINGS  Background activity:   This cap is of relatively poor quality due to electrode and movement artifact.  However, during the brief periods where the background is visible it appears to consist of generalized delta/theta slowing.  There are no epileptiform discharges or seizures appreciated.      Sleep:  No sleep is seen    Activation procedures:   Hyperventilation is not performed  Photic stimulation is not performed    Cardiac Monitor:   Electrode caps do not have EKG capabilities    Impression:   Electrode cap is of poor quality limiting interpretation.  However, brief periods where the background is visible and is characterized by generalized background slowing indicative of an encephalopathic state.  There are no pushbutton activations.  There are no apparent epileptiform discharges or electrographic seizures.  Depending on the clinical scenario, consider additional monitoring with standard scalp electrodes.    Suzanne Kumar MD  [facility]   Department of Neurology

## 2023-12-17 NOTE — PROGRESS NOTES
[facility] - Neuro Critical Care  Neurosurgery  Progress Note    Subjective:     History of Present Illness: Mr. Pate is a 73M w/ hx CVA, CKD3, HTN, DM2, COPD who presents with increasing lethargy and confusion. Per ED team, patient had 3 weeks of low back pain, with a fall yesterday leading to his presentation. He had subjective lower extremity weakness and urinary retention after the fall. Septic on presentation to ED. Too altered to meaningfully participate in spine motor exam at time of NSGY evaluation.     Post-Op Info:  Procedure(s) (LRB):  L-1 TO L-4 FUSION, SPINE, LUMBAR, TLIF, POSTERIOR APPROACH, USING PEDICLE SCREW (N/A)   2 Days Post-Op   Interval History: 12/17/2023: NAEON. AF; BP labile. Remains intubated, sedated. Exam stable. On cvEEG. MRI C/T/L w/wo completed.    Medications:  Continuous Infusions:   NORepinephrine bitartrate-D5W 0.06 mcg/kg/min (12/17/23 0801)    propofol 20 mcg/kg/min (12/17/23 0801)     Scheduled Meds:   acetaminophen  975 mg Rectal Q8H    fentaNYL  50 mcg Intravenous Once    fluticasone furoate-vilanteroL  1 puff Inhalation Daily    gabapentin  600 mg Per NG tube TID    meropenem (MERREM) IVPB  2 g Intravenous Q8H    methocarbamol (ROBAXIN) IVPB  500 mg Intravenous Q6H    mupirocin   Nasal BID    polyethylene glycol  17 g Per NG tube BID    senna-docusate 8.6-50 mg  1 tablet Per NG tube Daily    vancomycin (VANCOCIN) IV (PEDS and ADULTS)  1,750 mg Intravenous Q24H     PRN Meds:aluminum-magnesium hydroxide-simethicone, dextrose 10%, dextrose 10%, diphenhydrAMINE, fentaNYL, glucagon (human recombinant), glucose, glucose, HYDROmorphone, insulin aspart U-100, levalbuterol, magnesium oxide, magnesium oxide, melatonin, midazolam, morphine, naloxone, ondansetron, PHENYLephrine, potassium bicarbonate, potassium bicarbonate, potassium bicarbonate, potassium, sodium phosphates, potassium, sodium phosphates, potassium, sodium phosphates, sodium chloride 0.9%, sodium chloride 0.9%, Pharmacy  to dose Vancomycin consult **AND** vancomycin - pharmacy to dose     Review of Systems  Objective:     Weight: 119.7 kg (264 lb)  Body mass index is 38.99 kg/m².  Vital Signs (Most Recent):  Temp: 98.2 °F (36.8 °C) (12/17/23 0701)  Pulse: 85 (12/17/23 0801)  Resp: 18 (12/17/23 0801)  BP: 138/86 (12/17/23 0801)  SpO2: 100 % (12/17/23 0801) Vital Signs (24h Range):  Temp:  [97.5 °F (36.4 °C)-98.8 °F (37.1 °C)] 98.2 °F (36.8 °C)  Pulse:  [] 85  Resp:  [11-21] 18  SpO2:  [77 %-100 %] 100 %  BP: ()/(36-88) 138/86  Arterial Line BP: ()/(35-84) 165/77     Date 12/17/23 0700 - 12/18/23 0659   Shift 4721-8155 7475-6127 1401-3990 24 Hour Total   INTAKE   I.V.(mL/kg) 84.3(0.7)   84.3(0.7)   IV Piggyback 80   80   Shift Total(mL/kg) 164.3(1.4)   164.3(1.4)   OUTPUT   Urine(mL/kg/hr) 40   40   Shift Total(mL/kg) 40(0.3)   40(0.3)   Weight (kg) 119.7 119.7 119.7 119.7                Vent Mode: A/C  Oxygen Concentration (%):  [] 40  Resp Rate Total:  [0 br/min-27 br/min] 18 br/min  Vt Set:  [450 mL-480 mL] 480 mL  PEEP/CPAP:  [5 cmH20] 5 cmH20  Mean Airway Pressure:  [7.9 cmH20-10 cmH20] 9.3 cmH20             Closed/Suction Drain 12/15/23 1721 Tube - 1 Right Back Accordion 10 Fr. (Active)   Dressing Status Clean;Dry;Intact 12/16/23 1101   Dressing Intervention Integrity maintained 12/16/23 1101   Drainage Sanguineous;Bright red;Bloody 12/16/23 1101   Status Other (Comment) 12/16/23 1101   Output (mL) 160 mL 12/16/23 0528            Closed/Suction Drain 12/15/23 1722 Tube - 2 Left Back Accordion 10 Fr. (Active)   Dressing Status Clean;Dry;Intact 12/16/23 1101   Dressing Intervention Integrity maintained 12/16/23 1101   Drainage Sanguineous;Bright red;Bloody 12/16/23 1101   Status Other (Comment) 12/16/23 1101   Output (mL) 70 mL 12/16/23 0528            NG/OG Tube 12/14/23 0800 nasogastric Left nostril (Active)   Placement Check placement verified by x-ray 12/16/23 1101   Tolerance no signs/symptoms of  "discomfort 12/16/23 1101   Securement secured to nostril center w/ adhesive device 12/16/23 1101   Clamp Status/Tolerance clamped 12/16/23 1101   Suction Setting/Drainage Method suction at the bedside 12/16/23 1101   Insertion Site Appearance no redness, warmth, tenderness, skin breakdown, drainage 12/16/23 1101   Drainage None 12/16/23 1101   Flush/Irrigation flushed w/;water;no resistance met 12/16/23 1101   Feeding Type continuous 12/14/23 1226   Feeding Action feeding held 12/16/23 1101   Current Rate (mL/hr) 10 mL/hr 12/14/23 1226   Intake (mL) 50 mL 12/15/23 0006   Water Bolus (mL) 250 mL 12/14/23 2026   Tube Output(mL)(Include Discarded Residual) 0 mL 12/15/23 0006   Intake (mL) - Formula Tube Feeding 15 12/14/23 2026            Urethral Catheter 12/15/23 1636 Straight-tip;Silicone 16 Fr. (Active)   Site Assessment Clean;Intact 12/16/23 1101   Collection Container Standard drainage bag 12/16/23 1101   Securement Method secured to top of thigh w/ adhesive device 12/16/23 1101   Catheter Care Performed yes 12/16/23 1101   Reason for Continuing Urinary Catheterization Post operative 12/16/23 1101   CAUTI Prevention Bundle Securement Device in place with 1" slack;Intact seal between catheter & drainage tubing;Drainage bag/urimeter off the floor;Sheeting clip in use;No dependent loops or kinks;Drainage bag/urimeter not overfilled (<2/3 full);Drainage bag/urimeter below bladder 12/16/23 1101   Output (mL) 125 mL 12/16/23 1101         Physical Exam    E2VtM5,   Bsi  Loc BUE  WD BLE    WV in place  HV x 2 in place with SS output       Neurosurgery Physical Exam    Significant Labs:  Recent Labs   Lab 12/16/23  0141 12/17/23  0335   * 149*    135*   K 4.6 4.2    104   CO2 24 24   BUN 44* 43*   CREATININE 1.1 1.1   CALCIUM 9.2 9.1   MG 2.1 2.0       Recent Labs   Lab 12/16/23  0141 12/17/23  0335 12/17/23  0415   WBC 20.06* 20.15*  --    HGB 8.8* 8.2*  --    HCT 26.6* 24.2* 25*    394  --  " "      No results for input(s): "LABPT", "INR", "APTT" in the last 48 hours.    Microbiology Results (last 7 days)       Procedure Component Value Units Date/Time    Blood culture [[id number]] Collected: 12/12/23 0033    Order Status: Completed Specimen: Blood Updated: 12/17/23 0612     Blood Culture, Routine No growth after 5 days.    Blood culture [[id number]] Collected: 12/12/23 0033    Order Status: Completed Specimen: Blood Updated: 12/17/23 0612     Blood Culture, Routine No growth after 5 days.    AFB Culture & Smear [[id number]] Collected: 12/15/23 1603    Order Status: Completed Specimen: Wound from Back Updated: 12/16/23 2127     AFB Culture & Smear Culture in progress    Narrative:      Paraspinal    AFB Culture & Smear [[id number]] Collected: 12/15/23 1655    Order Status: Completed Specimen: Wound from Back Updated: 12/16/23 2127     AFB Culture & Smear Culture in progress    Narrative:      2.) 2,3 Disc space    AFB Culture & Smear [[id number]] Collected: 12/15/23 1657    Order Status: Completed Specimen: Wound from Back Updated: 12/16/23 2127     AFB Culture & Smear Culture in progress    Narrative:      3.) 2,3 Disc space    Aerobic culture [[id number]] Collected: 12/13/23 1703    Order Status: Completed Specimen: Abscess from Buttocks, Left Updated: 12/16/23 1109     Aerobic Bacterial Culture No growth    Aerobic culture [[id number]] Collected: 12/15/23 1603    Order Status: Completed Specimen: Wound from Back Updated: 12/16/23 0718     Aerobic Bacterial Culture No growth    Narrative:      Paraspinal    Aerobic culture [[id number]] Collected: 12/15/23 1655    Order Status: Completed Specimen: Wound from Back Updated: 12/16/23 0718     Aerobic Bacterial Culture No growth    Narrative:      2.) 2,3 Disc space    Culture, Anaerobe [[id number]] Collected: 12/15/23 1655    Order Status: Completed Specimen: Wound from Back Updated: 12/16/23 0710     Anaerobic Culture Culture in progress    Narrative:      " 2.) 2,3 Disc space    Culture, Anaerobe [[id number]] Collected: 12/15/23 1657    Order Status: Completed Specimen: Wound from Back Updated: 12/16/23 0710     Anaerobic Culture Culture in progress    Narrative:      3.) 2,3 Disc space    Culture, Anaerobe [[id number]] Collected: 12/15/23 1603    Order Status: Completed Specimen: Wound from Back Updated: 12/16/23 0710     Anaerobic Culture Culture in progress    Narrative:      Paraspinal    Aerobic culture [[id number]] Collected: 12/15/23 1657    Order Status: Completed Specimen: Wound from Back Updated: 12/16/23 0702     Aerobic Bacterial Culture No growth    Narrative:      3.) 2,3 Disc space    Blood culture x two cultures. Draw prior to antibiotics. [[id number]] Collected: 12/10/23 2007    Order Status: Completed Specimen: Blood from Peripheral, Antecubital, Left Updated: 12/15/23 2212     Blood Culture, Routine No growth after 5 days.    Narrative:      Aerobic and anaerobic    Blood culture x two cultures. Draw prior to antibiotics. [[id number]] Collected: 12/10/23 2007    Order Status: Completed Specimen: Blood from Peripheral, Hand, Left Updated: 12/15/23 2212     Blood Culture, Routine No growth after 5 days.    Narrative:      Aerobic and anaerobic    Gram stain [[id number]] Collected: 12/15/23 1657    Order Status: Completed Specimen: Wound from Back Updated: 12/15/23 1900     Gram Stain Result No WBC's      No organisms seen    Narrative:      3.) 2,3 Disc space    Gram stain [[id number]] Collected: 12/15/23 1655    Order Status: Completed Specimen: Wound from Back Updated: 12/15/23 1857     Gram Stain Result No WBC's      No organisms seen    Narrative:      2.) 2,3 Disc space    Gram stain [[id number]] Collected: 12/15/23 1603    Order Status: Completed Specimen: Wound from Back Updated: 12/15/23 1855     Gram Stain Result No WBC's      No organisms seen    Narrative:      Paraspinal    Fungus culture [[id number]] Collected: 12/15/23 1655    Order  Status: Sent Specimen: Wound from Back Updated: 12/15/23 1730    Fungus culture [[id number]] Collected: 12/15/23 1657    Order Status: Sent Specimen: Wound from Back Updated: 12/15/23 1727    Fungus culture [[id number]] Collected: 12/15/23 1603    Order Status: Sent Specimen: Wound from Back Updated: 12/15/23 1621    Culture, Anaerobe [[id number]]     Order Status: No result Specimen: Bone from Back     Aerobic culture [[id number]]     Order Status: No result Specimen: Bone from Back     AFB Culture & Smear [[id number]]     Order Status: No result Specimen: Bone from Back     Gram stain [[id number]]     Order Status: No result Specimen: Bone from Back     Fungus culture [[id number]]     Order Status: No result Specimen: Bone from Back     Culture, Anaerobic [[id number]] Collected: 12/13/23 1703    Order Status: Completed Specimen: Abscess from Buttocks, Left Updated: 12/15/23 0707     Anaerobic Culture Culture in progress    AFB Culture & Smear [[id number]] Collected: 12/13/23 1703    Order Status: Completed Specimen: Abscess from Buttocks, Left Updated: 12/14/23 2128     AFB Culture & Smear Culture in progress     AFB CULTURE STAIN No acid fast bacilli seen.    Gram stain [[id number]] Collected: 12/13/23 1703    Order Status: Completed Specimen: Abscess from Buttocks, Left Updated: 12/13/23 2104     Gram Stain Result No WBC's      No organisms seen    Fungus culture [[id number]] Collected: 12/13/23 1703    Order Status: Sent Specimen: Abscess from Buttocks, Left Updated: 12/13/23 1928          All pertinent labs from the last 24 hours have been reviewed.    Significant Diagnostics:  I have reviewed and interpreted all pertinent imaging results/findings within the past 24 hours.  X-Ray Chest AP Portable    Result Date: 12/16/2023  As above Electronically signed by: Laure Reynoso MD Date:    12/16/2023 Time:    12:43    CT Head Without Contrast    Result Date: 12/16/2023  Severe motion distorted examination as  detailed above essentially nondiagnostic portions of the vertex and skull base. No evidence for acute intracranial hemorrhage or sulcal effacement to suggest large territory recent infarction in the non motion distorted portion of the examination.  Clinical correlation and further evaluation with repeat attempts for imaging when patient better able to tolerate scanning advised. Electronically signed by: Maldonado Rodriguez DO Date:    12/16/2023 Time:    09:56 ]  Assessment/Plan:     * Acute osteomyelitis of lumbar spine  A 73M w/ hx CVA, CKD3, HTN, DM2, and COPD who presents with AMS likely 2/2 underling sepsis as well as progressive back pain and inability to ambulate for the past 2 weeks due to progressive L2-L3 osteodiscitis and presumed mechanical instability.          CTH 12/11: lacuanr infarcts   MRI w/wo L sp 12/11: severe central stenosis L4-5, discitis osteomyelitis at L2-3, L psoas abscess up to 2 cm  MRI C/T w/wo: very poor with alot of motion artifact, but no OM else where   US BLE 12/12: negative  12/10: , CRP >120  BCx NGTD  MRI Head, Csp, CTH 12/16: nondiagnostic     Recs  -admit to neuroICU, neurochecks per protocol  -all pertinent diagnostics reviewed  -MRI C/T/L w/wo 12/16 without contribution to picture of fluctuating sensorium and motor exam; severe spondylosis in Csp with some STIR and contrast enhancement prevertebral and possibly early osteodiscitis. MRI Tsp without concerning findings and MRI Lsp with expected postop changes.  -f/u intraoperative cultures, continue abx  -HV to full suction; fu WV  -MAP > 85  -WTE per NCC  -cvEEG and AMS workup per NCC  -rest of care per primary  -please notify neurosurgery on call for any acute neurologic change    Dispo: ongoing    D/w Dr. Sergey Aceves MD  Neurosurgery  [facility] - Neuro Critical Care

## 2023-12-17 NOTE — PLAN OF CARE
Neurocritical Care Attending    Wean vent  Weaning parameters  MRI spine non-contributory  Stop all sedating meds  Family updated at bedside    Hever Tate MD MPH  NeuroCritical Care & Vascular Neurology

## 2023-12-17 NOTE — PLAN OF CARE
POC reviewed with Bj Pate Jr. and family at 1400. Pt verbalized understanding. Questions and concerns addressed. No acute events today. Pt progressing toward goals. Will continue to monitor. See below and flowsheets for full assessment and VS info.       Neuro:  [place] Coma Scale  Best Eye Response: 4-->(E4) spontaneous  Best Motor Response: 6-->(M6) obeys commands  Best Verbal Response: 4-->(V4) confused  [place] Coma Scale Score: 14  Assessment Qualifiers: patient not sedated/intubated, no eye obstruction present  Pupil PERRLA: yes     24 hr Temp:  [97.8 °F (36.6 °C)-98.8 °F (37.1 °C)]     CV:   Rhythm: normal sinus rhythm  BP goals:   SBP < {SBP <:29269}  MAP > {MAPS:30276}    Resp:      Vent Mode: Spont  Set Rate: 18 BPM  Oxygen Concentration (%): 40  Vt Set: 480 mL  PEEP/CPAP: 5 cmH20  Pressure Support: 5 cmH20    Plan: {resp plan:41901}    GI/:     Diet/Nutrition Received: NPO, tube feeding  Last Bowel Movement: 12/14/23  Voiding Characteristics: urethral catheter (bladder)    Intake/Output Summary (Last 24 hours) at 12/17/2023 1628  Last data filed at 12/17/2023 1601  Gross per 24 hour   Intake 2809.86 ml   Output 1054 ml   Net 1755.86 ml     Unmeasured Output  Urine Occurrence: 1  Stool Occurrence: 1    Labs/Accuchecks:  Recent Labs   Lab 12/17/23  0335 12/17/23  0415   WBC 20.15*  --    RBC 2.71*  --    HGB 8.2*  --    HCT 24.2* 25*     --       Recent Labs   Lab 12/17/23  0335   *   K 4.2   CO2 24      BUN 43*   CREATININE 1.1   ALKPHOS 79   ALT 42   AST 63*   BILITOT 0.6      Recent Labs   Lab 12/14/23  1809   INR 1.0   APTT 25.9      Recent Labs   Lab 12/12/23  2333   TROPONINI 0.063*       Electrolytes: {electrolyte replacement:27915}  Accuchecks: {accuchecks:36230}    Gtts:   NORepinephrine bitartrate-D5W 0.03 mcg/kg/min (12/17/23 1601)       LDA/Wounds:  Lines/Drains/Airways       Drain  Duration                  NG/OG Tube 12/14/23 0800 nasogastric Left nostril 3 days          Closed/Suction Drain 12/15/23 1721 Tube - 1 Right Back Accordion 10 Fr. 1 day         Closed/Suction Drain 12/15/23 1722 Tube - 2 Left Back Accordion 10 Fr. 1 day    Male External Urinary Catheter 12/17/23 1128 Small <1 day              Arterial Line  Duration             Arterial Line 12/15/23 1507 Left Radial 2 days              Peripheral Intravenous Line  Duration                  Midline Catheter Insertion/Assessment  - Single Lumen 12/12/23 0215 Left basilic vein (medial side of arm) 20g x 10cm 5 days         Peripheral IV - Single Lumen 12/16/23 1731 20 G Anterior;Right Foot <1 day         Peripheral IV - Single Lumen 12/16/23 1733 18 G Anterior;Distal;Right Antecubital <1 day                  Wounds: {YES NO:23863}  Wound care consulted: {YES NO:94773}   Problem: Adult Inpatient Plan of Care  Goal: Plan of Care Review  Outcome: Ongoing, Progressing     Problem: Adult Inpatient Plan of Care  Goal: Optimal Comfort and Wellbeing  Outcome: Ongoing, Progressing     Problem: Bariatric Environmental Safety  Goal: Safety Maintained with Care  Outcome: Ongoing, Progressing     Problem: Restraint, Nonbehavioral (Nonviolent)  Goal: Absence of Harm or Injury  Outcome: Ongoing, Progressing

## 2023-12-17 NOTE — NURSING
Patient traveled to MRI with continuous cardiac monitoring, portable vent, IV pump, RT, and nurse assist x2. Patient tolerated scan well.

## 2023-12-17 NOTE — SUBJECTIVE & OBJECTIVE
Interval History: 12/17/2023: NAEON. AF; BP labile. Remains intubated, sedated. Exam stable. On cvEEG. MRI C/T/L w/wo completed.    Medications:  Continuous Infusions:   NORepinephrine bitartrate-D5W 0.06 mcg/kg/min (12/17/23 0801)    propofol 20 mcg/kg/min (12/17/23 0801)     Scheduled Meds:   acetaminophen  975 mg Rectal Q8H    fentaNYL  50 mcg Intravenous Once    fluticasone furoate-vilanteroL  1 puff Inhalation Daily    gabapentin  600 mg Per NG tube TID    meropenem (MERREM) IVPB  2 g Intravenous Q8H    methocarbamol (ROBAXIN) IVPB  500 mg Intravenous Q6H    mupirocin   Nasal BID    polyethylene glycol  17 g Per NG tube BID    senna-docusate 8.6-50 mg  1 tablet Per NG tube Daily    vancomycin (VANCOCIN) IV (PEDS and ADULTS)  1,750 mg Intravenous Q24H     PRN Meds:aluminum-magnesium hydroxide-simethicone, dextrose 10%, dextrose 10%, diphenhydrAMINE, fentaNYL, glucagon (human recombinant), glucose, glucose, HYDROmorphone, insulin aspart U-100, levalbuterol, magnesium oxide, magnesium oxide, melatonin, midazolam, morphine, naloxone, ondansetron, PHENYLephrine, potassium bicarbonate, potassium bicarbonate, potassium bicarbonate, potassium, sodium phosphates, potassium, sodium phosphates, potassium, sodium phosphates, sodium chloride 0.9%, sodium chloride 0.9%, Pharmacy to dose Vancomycin consult **AND** vancomycin - pharmacy to dose     Review of Systems  Objective:     Weight: 119.7 kg (264 lb)  Body mass index is 38.99 kg/m².  Vital Signs (Most Recent):  Temp: 98.2 °F (36.8 °C) (12/17/23 0701)  Pulse: 85 (12/17/23 0801)  Resp: 18 (12/17/23 0801)  BP: 138/86 (12/17/23 0801)  SpO2: 100 % (12/17/23 0801) Vital Signs (24h Range):  Temp:  [97.5 °F (36.4 °C)-98.8 °F (37.1 °C)] 98.2 °F (36.8 °C)  Pulse:  [] 85  Resp:  [11-21] 18  SpO2:  [77 %-100 %] 100 %  BP: ()/(36-88) 138/86  Arterial Line BP: ()/(35-84) 165/77     Date 12/17/23 0700 - 12/18/23 0659   Shift 4536-2396 4943-7989 9690-2489 24 Hour  Total   INTAKE   I.V.(mL/kg) 84.3(0.7)   84.3(0.7)   IV Piggyback 80   80   Shift Total(mL/kg) 164.3(1.4)   164.3(1.4)   OUTPUT   Urine(mL/kg/hr) 40   40   Shift Total(mL/kg) 40(0.3)   40(0.3)   Weight (kg) 119.7 119.7 119.7 119.7                Vent Mode: A/C  Oxygen Concentration (%):  [] 40  Resp Rate Total:  [0 br/min-27 br/min] 18 br/min  Vt Set:  [450 mL-480 mL] 480 mL  PEEP/CPAP:  [5 cmH20] 5 cmH20  Mean Airway Pressure:  [7.9 cmH20-10 cmH20] 9.3 cmH20             Closed/Suction Drain 12/15/23 1721 Tube - 1 Right Back Accordion 10 Fr. (Active)   Dressing Status Clean;Dry;Intact 12/16/23 1101   Dressing Intervention Integrity maintained 12/16/23 1101   Drainage Sanguineous;Bright red;Bloody 12/16/23 1101   Status Other (Comment) 12/16/23 1101   Output (mL) 160 mL 12/16/23 0528            Closed/Suction Drain 12/15/23 1722 Tube - 2 Left Back Accordion 10 Fr. (Active)   Dressing Status Clean;Dry;Intact 12/16/23 1101   Dressing Intervention Integrity maintained 12/16/23 1101   Drainage Sanguineous;Bright red;Bloody 12/16/23 1101   Status Other (Comment) 12/16/23 1101   Output (mL) 70 mL 12/16/23 0528            NG/OG Tube 12/14/23 0800 nasogastric Left nostril (Active)   Placement Check placement verified by x-ray 12/16/23 1101   Tolerance no signs/symptoms of discomfort 12/16/23 1101   Securement secured to nostril center w/ adhesive device 12/16/23 1101   Clamp Status/Tolerance clamped 12/16/23 1101   Suction Setting/Drainage Method suction at the bedside 12/16/23 1101   Insertion Site Appearance no redness, warmth, tenderness, skin breakdown, drainage 12/16/23 1101   Drainage None 12/16/23 1101   Flush/Irrigation flushed w/;water;no resistance met 12/16/23 1101   Feeding Type continuous 12/14/23 1226   Feeding Action feeding held 12/16/23 1101   Current Rate (mL/hr) 10 mL/hr 12/14/23 1226   Intake (mL) 50 mL 12/15/23 0006   Water Bolus (mL) 250 mL 12/14/23 2026   Tube Output(mL)(Include Discarded  "Residual) 0 mL 12/15/23 0006   Intake (mL) - Formula Tube Feeding 15 12/14/23 2026            Urethral Catheter 12/15/23 1636 Straight-tip;Silicone 16 Fr. (Active)   Site Assessment Clean;Intact 12/16/23 1101   Collection Container Standard drainage bag 12/16/23 1101   Securement Method secured to top of thigh w/ adhesive device 12/16/23 1101   Catheter Care Performed yes 12/16/23 1101   Reason for Continuing Urinary Catheterization Post operative 12/16/23 1101   CAUTI Prevention Bundle Securement Device in place with 1" slack;Intact seal between catheter & drainage tubing;Drainage bag/urimeter off the floor;Sheeting clip in use;No dependent loops or kinks;Drainage bag/urimeter not overfilled (<2/3 full);Drainage bag/urimeter below bladder 12/16/23 1101   Output (mL) 125 mL 12/16/23 1101          Physical Exam    E2VtM5,   Bsi  Loc BUE  WD BLE    WV in place  HV x 2 in place with SS output       Neurosurgery Physical Exam    Significant Labs:  Recent Labs   Lab 12/16/23  0141 12/17/23  0335   * 149*    135*   K 4.6 4.2    104   CO2 24 24   BUN 44* 43*   CREATININE 1.1 1.1   CALCIUM 9.2 9.1   MG 2.1 2.0       Recent Labs   Lab 12/16/23  0141 12/17/23  0335 12/17/23  0415   WBC 20.06* 20.15*  --    HGB 8.8* 8.2*  --    HCT 26.6* 24.2* 25*    394  --        No results for input(s): "LABPT", "INR", "APTT" in the last 48 hours.    Microbiology Results (last 7 days)       Procedure Component Value Units Date/Time    Blood culture [[id number]] Collected: 12/12/23 0033    Order Status: Completed Specimen: Blood Updated: 12/17/23 0612     Blood Culture, Routine No growth after 5 days.    Blood culture [[id number]] Collected: 12/12/23 0033    Order Status: Completed Specimen: Blood Updated: 12/17/23 0612     Blood Culture, Routine No growth after 5 days.    AFB Culture & Smear [[id number]] Collected: 12/15/23 1603    Order Status: Completed Specimen: Wound from Back Updated: 12/16/23 2127     AFB " Culture & Smear Culture in progress    Narrative:      Paraspinal    AFB Culture & Smear [[id number]] Collected: 12/15/23 1655    Order Status: Completed Specimen: Wound from Back Updated: 12/16/23 2127     AFB Culture & Smear Culture in progress    Narrative:      2.) 2,3 Disc space    AFB Culture & Smear [[id number]] Collected: 12/15/23 1657    Order Status: Completed Specimen: Wound from Back Updated: 12/16/23 2127     AFB Culture & Smear Culture in progress    Narrative:      3.) 2,3 Disc space    Aerobic culture [[id number]] Collected: 12/13/23 1703    Order Status: Completed Specimen: Abscess from Buttocks, Left Updated: 12/16/23 1109     Aerobic Bacterial Culture No growth    Aerobic culture [[id number]] Collected: 12/15/23 1603    Order Status: Completed Specimen: Wound from Back Updated: 12/16/23 0718     Aerobic Bacterial Culture No growth    Narrative:      Paraspinal    Aerobic culture [[id number]] Collected: 12/15/23 1655    Order Status: Completed Specimen: Wound from Back Updated: 12/16/23 0718     Aerobic Bacterial Culture No growth    Narrative:      2.) 2,3 Disc space    Culture, Anaerobe [[id number]] Collected: 12/15/23 1655    Order Status: Completed Specimen: Wound from Back Updated: 12/16/23 0710     Anaerobic Culture Culture in progress    Narrative:      2.) 2,3 Disc space    Culture, Anaerobe [[id number]] Collected: 12/15/23 1657    Order Status: Completed Specimen: Wound from Back Updated: 12/16/23 0710     Anaerobic Culture Culture in progress    Narrative:      3.) 2,3 Disc space    Culture, Anaerobe [[id number]] Collected: 12/15/23 1603    Order Status: Completed Specimen: Wound from Back Updated: 12/16/23 0710     Anaerobic Culture Culture in progress    Narrative:      Paraspinal    Aerobic culture [[id number]] Collected: 12/15/23 1657    Order Status: Completed Specimen: Wound from Back Updated: 12/16/23 0702     Aerobic Bacterial Culture No growth    Narrative:      3.) 2,3 Disc  space    Blood culture x two cultures. Draw prior to antibiotics. [[id number]] Collected: 12/10/23 2007    Order Status: Completed Specimen: Blood from Peripheral, Antecubital, Left Updated: 12/15/23 2212     Blood Culture, Routine No growth after 5 days.    Narrative:      Aerobic and anaerobic    Blood culture x two cultures. Draw prior to antibiotics. [[id number]] Collected: 12/10/23 2007    Order Status: Completed Specimen: Blood from Peripheral, Hand, Left Updated: 12/15/23 2212     Blood Culture, Routine No growth after 5 days.    Narrative:      Aerobic and anaerobic    Gram stain [[id number]] Collected: 12/15/23 1657    Order Status: Completed Specimen: Wound from Back Updated: 12/15/23 1900     Gram Stain Result No WBC's      No organisms seen    Narrative:      3.) 2,3 Disc space    Gram stain [[id number]] Collected: 12/15/23 1655    Order Status: Completed Specimen: Wound from Back Updated: 12/15/23 1857     Gram Stain Result No WBC's      No organisms seen    Narrative:      2.) 2,3 Disc space    Gram stain [[id number]] Collected: 12/15/23 1603    Order Status: Completed Specimen: Wound from Back Updated: 12/15/23 1855     Gram Stain Result No WBC's      No organisms seen    Narrative:      Paraspinal    Fungus culture [[id number]] Collected: 12/15/23 1655    Order Status: Sent Specimen: Wound from Back Updated: 12/15/23 1730    Fungus culture [[id number]] Collected: 12/15/23 1657    Order Status: Sent Specimen: Wound from Back Updated: 12/15/23 1727    Fungus culture [[id number]] Collected: 12/15/23 1603    Order Status: Sent Specimen: Wound from Back Updated: 12/15/23 1621    Culture, Anaerobe [[id number]]     Order Status: No result Specimen: Bone from Back     Aerobic culture [[id number]]     Order Status: No result Specimen: Bone from Back     AFB Culture & Smear [[id number]]     Order Status: No result Specimen: Bone from Back     Gram stain [[id number]]     Order Status: No result Specimen: Bone  from Back     Fungus culture [[id number]]     Order Status: No result Specimen: Bone from Back     Culture, Anaerobic [[id number]] Collected: 12/13/23 1703    Order Status: Completed Specimen: Abscess from Buttocks, Left Updated: 12/15/23 0707     Anaerobic Culture Culture in progress    AFB Culture & Smear [[id number]] Collected: 12/13/23 1703    Order Status: Completed Specimen: Abscess from Buttocks, Left Updated: 12/14/23 2128     AFB Culture & Smear Culture in progress     AFB CULTURE STAIN No acid fast bacilli seen.    Gram stain [[id number]] Collected: 12/13/23 1703    Order Status: Completed Specimen: Abscess from Buttocks, Left Updated: 12/13/23 2104     Gram Stain Result No WBC's      No organisms seen    Fungus culture [[id number]] Collected: 12/13/23 1703    Order Status: Sent Specimen: Abscess from Buttocks, Left Updated: 12/13/23 1928          All pertinent labs from the last 24 hours have been reviewed.    Significant Diagnostics:  I have reviewed and interpreted all pertinent imaging results/findings within the past 24 hours.  X-Ray Chest AP Portable    Result Date: 12/16/2023  As above Electronically signed by: Laure Reynoso MD Date:    12/16/2023 Time:    12:43    CT Head Without Contrast    Result Date: 12/16/2023  Severe motion distorted examination as detailed above essentially nondiagnostic portions of the vertex and skull base. No evidence for acute intracranial hemorrhage or sulcal effacement to suggest large territory recent infarction in the non motion distorted portion of the examination.  Clinical correlation and further evaluation with repeat attempts for imaging when patient better able to tolerate scanning advised. Electronically signed by: Maldonado Rodriguez DO Date:    12/16/2023 Time:    09:56 ]

## 2023-12-17 NOTE — PLAN OF CARE
POC reviewed with Bj Pate Jr. and family at 1400. Pt verbalized understanding. Questions and concerns addressed. No acute events today. Pt progressing toward goals. Will continue to monitor. See below and flowsheets for full assessment and VS info.     - Propofol discontinued.   - Rivera removed, condom catheter in place.   - Titrating levophed as tolerated to maintain MAP of 85.   - Phosphorous replaced.   - Extubated to 3L NC.       Neuro:  [place] Coma Scale  Best Eye Response: 4-->(E4) spontaneous  Best Motor Response: 6-->(M6) obeys commands  Best Verbal Response: 4-->(V4) confused  [place] Coma Scale Score: 14  Assessment Qualifiers: patient not sedated/intubated, no eye obstruction present  Pupil PERRLA: yes     24 hr Temp:  [97.8 °F (36.6 °C)-98.8 °F (37.1 °C)]     CV:   Rhythm: normal sinus rhythm  BP goals:   SBP < 160  MAP > 85    Resp:      Vent Mode: Spont  Set Rate: 18 BPM  Oxygen Concentration (%): 40  Vt Set: 480 mL  PEEP/CPAP: 5 cmH20  Pressure Support: 5 cmH20    Plan: N/A    GI/:     Diet/Nutrition Received: NPO, tube feeding  Last Bowel Movement: 12/14/23  Voiding Characteristics: urethral catheter (bladder)    Intake/Output Summary (Last 24 hours) at 12/17/2023 1654  Last data filed at 12/17/2023 1601  Gross per 24 hour   Intake 2809.86 ml   Output 1054 ml   Net 1755.86 ml     Unmeasured Output  Urine Occurrence: 1  Stool Occurrence: 1    Labs/Accuchecks:  Recent Labs   Lab 12/17/23  0335 12/17/23  0415   WBC 20.15*  --    RBC 2.71*  --    HGB 8.2*  --    HCT 24.2* 25*     --       Recent Labs   Lab 12/17/23  0335   *   K 4.2   CO2 24      BUN 43*   CREATININE 1.1   ALKPHOS 79   ALT 42   AST 63*   BILITOT 0.6      Recent Labs   Lab 12/14/23  1809   INR 1.0   APTT 25.9      Recent Labs   Lab 12/12/23  2333   TROPONINI 0.063*       Electrolytes: Electrolytes replaced  Accuchecks: ACHS    Gtts:   NORepinephrine bitartrate-D5W 0.03 mcg/kg/min (12/17/23 5463)        LDA/Wounds:  Lines/Drains/Airways       Drain  Duration                  NG/OG Tube 12/14/23 0800 nasogastric Left nostril 3 days         Closed/Suction Drain 12/15/23 1721 Tube - 1 Right Back Accordion 10 Fr. 1 day         Closed/Suction Drain 12/15/23 1722 Tube - 2 Left Back Accordion 10 Fr. 1 day    Male External Urinary Catheter 12/17/23 1128 Small <1 day              Arterial Line  Duration             Arterial Line 12/15/23 1507 Left Radial 2 days              Peripheral Intravenous Line  Duration                  Midline Catheter Insertion/Assessment  - Single Lumen 12/12/23 0215 Left basilic vein (medial side of arm) 20g x 10cm 5 days         Peripheral IV - Single Lumen 12/16/23 1731 20 G Anterior;Right Foot <1 day         Peripheral IV - Single Lumen 12/16/23 1733 18 G Anterior;Distal;Right Antecubital <1 day                  Wounds: Yes  Wound care consulted: Yes    Is this a stroke patient? No.      Problem: Adult Inpatient Plan of Care  Goal: Plan of Care Review  12/17/2023 1653 by Janis Santiago RN  Outcome: Ongoing, Progressing  12/17/2023 1627 by Janis Santiago RN  Outcome: Ongoing, Progressing     Problem: Adult Inpatient Plan of Care  Goal: Optimal Comfort and Wellbeing  12/17/2023 1653 by Janis Santiago RN  Outcome: Ongoing, Progressing  12/17/2023 1627 by Janis Santiago RN  Outcome: Ongoing, Progressing     Problem: Adult Inpatient Plan of Care  Goal: Readiness for Transition of Care  12/17/2023 1653 by Janis Santiago RN  Outcome: Ongoing, Progressing  12/17/2023 1627 by Janis Santiago RN  Outcome: Ongoing, Progressing     Problem: Bariatric Environmental Safety  Goal: Safety Maintained with Care  12/17/2023 1653 by Janis Santiago RN  Outcome: Ongoing, Progressing  12/17/2023 1627 by Janis Santiago RN  Outcome: Ongoing, Progressing     Problem: Glycemic Control Impaired (Sepsis/Septic Shock)  Goal: Blood Glucose Level Within Desired Range  12/17/2023 1653 by Janis Santiago  RN  Outcome: Ongoing, Progressing  12/17/2023 1627 by Janis Santiago RN  Outcome: Ongoing, Progressing     Problem: Restraint, Nonbehavioral (Nonviolent)  Goal: Absence of Harm or Injury  12/17/2023 1653 by Janis Santiago RN  Outcome: Ongoing, Progressing  12/17/2023 1627 by Janis Santiago RN  Outcome: Ongoing, Progressing

## 2023-12-17 NOTE — ASSESSMENT & PLAN NOTE
74 yo male with PMH of CVA, CKD3, HTN, DM2, COPD admitted with increasing lethargy s/p fall found to have L psoas abscess/lumbar OM. Hospital course notable for IR aspiration of L2/3 disc space (cx so far ngtd) and washout with lumbar fusion/TLIF/laminectomy with NSGY on 12/15 - post-op notable for lethargy with transfer to ICU, intubated and on pressors. Op note with purulent material, cx and path in process, so far no growth to date. Post-op MRI in process.       Recommendations:  - continue empiric vancomycin pharm to dose and meropenem pending cx data  - follow up cx/path  - follow up repeat MRI

## 2023-12-17 NOTE — OP NOTE
DATE OF SURGERY: 12/15/23     PREOPERATIVE DIAGNOSIS:  1. 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 Sepsis with encephalopathy and quadriparesis, Class II obesity     POSTOPERATIVE DIAGNOSIS:  Same.     PROCEDURE PERFORMED:  1. Posterior spinal fusion, L1 to L4   2. Posterior segmental spinal fixation, L1 to L4 (DePuy Synthes)  3. L2-3 disc debridement for discitis  4. Posterior lumbar interbody fusion, L2-3  5.  Application of titanium interbody spacer, L2-3 (DePuy Synthes)  6. L1-2 through L3-4 and partial L4-5 laminectomy for severe stenosis  7.  Use of intraoperative fluoroscopy  8.  Use of intraoperative neuro-monitoring with MEPs  9. Infuse and cancellous chips bone grafting     SURGEON: Rolf Rincon M.D.     ASSISTANT: Suzan Traylor M.D.     ANESTHESIA: GETA     ESTIMATED BLOOD LOSS: 500mL     COMPLICATIONS: None     DRAINS: Two deep Hemovacs and a Prevena incisional woundvac     SPECIMENS SENT: Cultures of paraspinal soft tissues and L2-3 disc space     FINDINGS: None     INDICATIONS:     73M who presented to hospital with sepsis, encephalopathy and quadriparesis. Imaging was very poor quality due to motion artifact. There was evident L2-3 discitis with lumbar spine instability and severe lumbar stenosis. I recommended the above procedure. R/B/A/I/M were reviewed in detail and the patient wished to proceed. All questions were answered and no guarantees were made.  .  PROCEDURE:     The patient was brought into the operating room where he was intubated and placed under general anesthesia without difficulty.  All lines were placed. He was repositioned prone onto the operating room table with appropriate padding all pressure points and his head was placed in GW tongs for protective cervical hanging weight traction. The region of interest from L1 to L4 was localized with AP and lateral x-ray.  The skin was marked and the area was prepped and draped in the usual sterile fashion.  A timeout was performed  prior to procedure.  20 mL's of lidocaine with epinephrine were injected in the skin.     A midline linear incision was made with a 10 blade from approximately L1 to L4.  Supra and subfascial midline dissection was carried out with Bovie electrocautery.  Subperiosteal dissection was carried out with Bovie electrocautery and Kirk elevators to expose the posterior elements from L1 to L4.  A pedicle finder was placed into the presumed right L3 pedicle and confirmed on lateral x-ray. Medial facetectomies were performed bilaterally at L1-2 through L3-4 with the osteotome.       We placed freehand pedicle screws bilaterally from L1 to L4. Triggered EMG and xrays confirmed good screw position.     Attention was turned to performance of an L2-3 discectomy for debridement of discitis and for a posterior lumbar interbody fusion from a right-sided approach. First an L2-3 laminectomy and bilateral foraminotomies for central and foraminal stenosis was performed in standard fashion to decompress the thecal sac and nerve roots. THIS DECOMPRESSION WAS MORE THAN WHAT WAS REQUIRED FOR AN INTERBODY FUSION. It was performed in standard fashion with the high speed drill and rongeurs. Adequate decompression of the thecal sac and nerve roots were confirmed with Glynn palpation.  Next,  a nerve root retractor was used to retract the thecal sac medially and expose the L2-3 disc space.  Epidural veins were cauterized and divided.  An annulotomy was performed with a 15 blade.  A pituitary rongeur was used to remove disc material.  The disc space was thoroughly debrided sharply with disc alex, rasps, and curettes. The disc space was cultured and specimen was sent for culture. The disc space was irrigated with betadine. A titanium cage was countersunk into the L2-3 disc space and had snug fit. The L2-3 disc space was packed with a small Infuse sponge for interbody arthrodesis. Xray showed good cage position.    An L1-2, L3-4 and partial  L4-5 laminectomy was performed in standard fashion with the high speed drill and rongeurs for severe central stenosis. Bilateral foraminotomies were performed at L1-2 and L3-4 for severe foraminal stenosis in standard fashion with Kerrison punches. The thecal sac was well decompressed rostracaudally and from lateral recess to lateral recess at each level with [place] probe confirmation.     Bilateral titanium rods were sized, contoured and reduced into the tulip heads.  Set screws were tightened.  A crosslink at L2-3 was placed. Final xrays showed excellent  position of all hardware.  The wound was copiously irrigated with sterile normal saline and a dilute Betadine solution. Exposed bony surfaces from L1 to L4 were decorticated bilaterally with a high-speed drill.  Infuse and cancellous chips were placed posteriorly for arthrodesis from L1 to L4. Two deep Hemovacs placed.  A watertight fascial closure was achieved with interrupted 0 Vicryl sutures and a running Stratafix suture.  The soft tissues were closed in layers. Staples and vertical mattress nylon sutures were placed at the skin and a Prevena dressing was applied.     The patient appeared to tolerate the procedure well from a hemodynamic and neuromonitoring standpoint. MEPs were present and stable in all extremities throughout the case. I was present for all critical portions of the case, and at the end of the case all counts were correct. The patient was repositioned supine onto the hospital bed where he was extubated and allowed to emerge from anesthesia. He was sent to the ICU in stable condition for recovery.    JUSTIFICATION OF MODIFIER 22: This was a complex spinal debridement and fusion for spondylodiscitis with instability in a patient with sepsis, encephalopathy, quariparesis and class II obesity. It required significantly increased preop planning mental effort, technical skill and time to perform it safely. Inherent risks of the procedure were  elevated over similar procedures due to the MCCs.

## 2023-12-17 NOTE — PROGRESS NOTES
Pharmacokinetic Assessment Follow Up: IV Vancomycin    Vancomycin serum concentration assessment/plan:  Trough resulted as 19.9 mcg/mL; Goal 15-20 mcg/mL,  Spinal OM   Renal function stable, Cr 1.1 mg/dL, UOP 0.4 mL/kg/hr   Continue Vancomycin 1750 mg IV q24h  Next level to be drawn on 12/20 at 1000     Drug levels (last 3 results):  Recent Labs   Lab Result Units 12/17/23  1015   Vancomycin-Trough ug/mL 19.9       Pharmacy will continue to follow and monitor vancomycin.    Please contact pharmacy at extension [phone] for questions regarding this assessment.    Thank you for the consult,   Traci Shell       Patient brief summary:  Bj Pate Jr. is a 73 y.o. male initiated on antimicrobial therapy with IV Vancomycin for treatment of  spinal OM    The patient's current regimen is 1750 mg Q24H    Drug Allergies:   Review of patient's allergies indicates:   Allergen Reactions    Tomato (solanum lycopersicum) Hives    Naproxen Hives    Shrimp Other (See Comments)       Actual Body Weight:   119.7 kg     Renal Function:   Estimated Creatinine Clearance: 78.7 mL/min (based on SCr of 1.1 mg/dL).,     Dialysis Method (if applicable):  N/A    CBC (last 72 hours):  Recent Labs   Lab Result Units 12/15/23  0638 12/16/23 0141 12/17/23  0335   WBC K/uL 15.58* 20.06* 20.15*   Hemoglobin g/dL 9.9* 8.8* 8.2*   Hematocrit % 29.1* 26.6* 24.2*   Platelets K/uL 364 332 394   Gran % % 81.1* 87.8* 71.0   Lymph % % 7.0* 2.6* 8.0*   Mono % % 9.7 6.0 11.0   Eosinophil % % 0.2 0.1 0.0   Basophil % % 0.2 0.1 0.0   Differential Method  Automated Automated Manual       Metabolic Panel (last 72 hours):  Recent Labs   Lab Result Units 12/15/23  0638 12/16/23  0141 12/17/23  0335   Sodium mmol/L 139 137 135*   Potassium mmol/L 4.3 4.6 4.2   Chloride mmol/L 106 107 104   CO2 mmol/L 27 24 24   Glucose mg/dL 106 188* 149*   BUN mg/dL 42* 44* 43*   Creatinine mg/dL 1.0 1.1 1.1   Albumin g/dL 1.5* 1.4* 1.5*   Total Bilirubin mg/dL 0.8 0.7 0.6    Alkaline Phosphatase U/L 92 90 79   AST U/L 197* 94* 63*   ALT U/L 68* 51* 42   Magnesium mg/dL 2.2 2.1 2.0   Phosphorus mg/dL 2.6* 3.3 2.4*       Vancomycin Administrations:  vancomycin given in the last 96 hours                     vancomycin 1.75 g in 5 % dextrose 500 mL IVPB (mg) 1,750 mg New Bag 12/17/23 1049      Restarted 12/16/23 1209     1,750 mg New Bag  1039     1,750 mg New Bag 12/14/23 1045    vancomycin injection (g) 1 g Given 12/15/23 1622                    Microbiologic Results:  Microbiology Results (last 7 days)       Procedure Component Value Units Date/Time    Blood culture [[id number]] Collected: 12/12/23 0033    Order Status: Completed Specimen: Blood Updated: 12/17/23 0612     Blood Culture, Routine No growth after 5 days.    Blood culture [[id number]] Collected: 12/12/23 0033    Order Status: Completed Specimen: Blood Updated: 12/17/23 0612     Blood Culture, Routine No growth after 5 days.    AFB Culture & Smear [[id number]] Collected: 12/15/23 1603    Order Status: Completed Specimen: Wound from Back Updated: 12/16/23 2127     AFB Culture & Smear Culture in progress    Narrative:      Paraspinal    AFB Culture & Smear [[id number]] Collected: 12/15/23 1655    Order Status: Completed Specimen: Wound from Back Updated: 12/16/23 2127     AFB Culture & Smear Culture in progress    Narrative:      2.) 2,3 Disc space    AFB Culture & Smear [[id number]] Collected: 12/15/23 1657    Order Status: Completed Specimen: Wound from Back Updated: 12/16/23 2127     AFB Culture & Smear Culture in progress    Narrative:      3.) 2,3 Disc space    Aerobic culture [[id number]] Collected: 12/13/23 1703    Order Status: Completed Specimen: Abscess from Buttocks, Left Updated: 12/16/23 1109     Aerobic Bacterial Culture No growth    Aerobic culture [[id number]] Collected: 12/15/23 1603    Order Status: Completed Specimen: Wound from Back Updated: 12/16/23 0718     Aerobic Bacterial Culture No growth     Narrative:      Paraspinal    Aerobic culture [[id number]] Collected: 12/15/23 1655    Order Status: Completed Specimen: Wound from Back Updated: 12/16/23 0718     Aerobic Bacterial Culture No growth    Narrative:      2.) 2,3 Disc space    Culture, Anaerobe [[id number]] Collected: 12/15/23 1655    Order Status: Completed Specimen: Wound from Back Updated: 12/16/23 0710     Anaerobic Culture Culture in progress    Narrative:      2.) 2,3 Disc space    Culture, Anaerobe [[id number]] Collected: 12/15/23 1657    Order Status: Completed Specimen: Wound from Back Updated: 12/16/23 0710     Anaerobic Culture Culture in progress    Narrative:      3.) 2,3 Disc space    Culture, Anaerobe [[id number]] Collected: 12/15/23 1603    Order Status: Completed Specimen: Wound from Back Updated: 12/16/23 0710     Anaerobic Culture Culture in progress    Narrative:      Paraspinal    Aerobic culture [[id number]] Collected: 12/15/23 1657    Order Status: Completed Specimen: Wound from Back Updated: 12/16/23 0702     Aerobic Bacterial Culture No growth    Narrative:      3.) 2,3 Disc space    Blood culture x two cultures. Draw prior to antibiotics. [[id number]] Collected: 12/10/23 2007    Order Status: Completed Specimen: Blood from Peripheral, Antecubital, Left Updated: 12/15/23 2212     Blood Culture, Routine No growth after 5 days.    Narrative:      Aerobic and anaerobic    Blood culture x two cultures. Draw prior to antibiotics. [[id number]] Collected: 12/10/23 2007    Order Status: Completed Specimen: Blood from Peripheral, Hand, Left Updated: 12/15/23 2212     Blood Culture, Routine No growth after 5 days.    Narrative:      Aerobic and anaerobic    Gram stain [[id number]] Collected: 12/15/23 1657    Order Status: Completed Specimen: Wound from Back Updated: 12/15/23 1900     Gram Stain Result No WBC's      No organisms seen    Narrative:      3.) 2,3 Disc space    Gram stain [[id number]] Collected: 12/15/23 1655    Order Status:  Completed Specimen: Wound from Back Updated: 12/15/23 1857     Gram Stain Result No WBC's      No organisms seen    Narrative:      2.) 2,3 Disc space    Gram stain [[id number]] Collected: 12/15/23 1603    Order Status: Completed Specimen: Wound from Back Updated: 12/15/23 1855     Gram Stain Result No WBC's      No organisms seen    Narrative:      Paraspinal    Fungus culture [[id number]] Collected: 12/15/23 1655    Order Status: Sent Specimen: Wound from Back Updated: 12/15/23 1730    Fungus culture [[id number]] Collected: 12/15/23 1657    Order Status: Sent Specimen: Wound from Back Updated: 12/15/23 1727    Fungus culture [[id number]] Collected: 12/15/23 1603    Order Status: Sent Specimen: Wound from Back Updated: 12/15/23 1621    Culture, Anaerobe [[id number]]     Order Status: No result Specimen: Bone from Back     Aerobic culture [[id number]]     Order Status: No result Specimen: Bone from Back     AFB Culture & Smear [[id number]]     Order Status: No result Specimen: Bone from Back     Gram stain [[id number]]     Order Status: No result Specimen: Bone from Back     Fungus culture [[id number]]     Order Status: No result Specimen: Bone from Back     Culture, Anaerobic [[id number]] Collected: 12/13/23 1703    Order Status: Completed Specimen: Abscess from Buttocks, Left Updated: 12/15/23 0707     Anaerobic Culture Culture in progress    AFB Culture & Smear [[id number]] Collected: 12/13/23 1703    Order Status: Completed Specimen: Abscess from Buttocks, Left Updated: 12/14/23 2128     AFB Culture & Smear Culture in progress     AFB CULTURE STAIN No acid fast bacilli seen.    Gram stain [[id number]] Collected: 12/13/23 1703    Order Status: Completed Specimen: Abscess from Buttocks, Left Updated: 12/13/23 2104     Gram Stain Result No WBC's      No organisms seen    Fungus culture [[id number]] Collected: 12/13/23 1703    Order Status: Sent Specimen: Abscess from Buttocks, Left Updated: 12/13/23 1928

## 2023-12-17 NOTE — ASSESSMENT & PLAN NOTE
A 73M w/ hx CVA, CKD3, HTN, DM2, and COPD who presents with AMS likely 2/2 underling sepsis as well as progressive back pain and inability to ambulate for the past 2 weeks due to progressive L2-L3 osteodiscitis and presumed mechanical instability.          CTH 12/11: lacuanr infarcts   MRI w/wo L sp 12/11: severe central stenosis L4-5, discitis osteomyelitis at L2-3, L psoas abscess up to 2 cm  MRI C/T w/wo: very poor with alot of motion artifact, but no OM else where   US BLE 12/12: negative  12/10: , CRP >120  BCx NGTD  MRI Head, Csp, CTH 12/16: nondiagnostic     Recs  -admit to neuroICU, neurochecks per protocol  -all pertinent diagnostics reviewed  -MRI C/T/L w/wo 12/16 without contribution to picture of fluctuating sensorium and motor exam; severe spondylosis in Csp with some STIR and contrast enhancement prevertebral and possibly early osteodiscitis. MRI Tsp without concerning findings and MRI Lsp with expected postop changes.  -f/u intraoperative cultures, continue abx  -HV to full suction; fu WV  -MAP > 85  -WTE per NCC  -cvEEG and AMS workup per NCC  -rest of care per primary  -please notify neurosurgery on call for any acute neurologic change    Dispo: ongoing    D/w Dr. Rincon

## 2023-12-17 NOTE — PLAN OF CARE
[facility] Care Plan    POC reviewed with Bj Pate Jr. and family at 0600. Pt unable to verbalize understanding due to intubation. Questions and concerns addressed with patients daughter at bedside. Will continue to monitor. See below and flowsheets for full assessment and VS info.     - MRI completed  - Prop gtt continued   - PRN versed given x1 during MRI   - Rivera catheter in place  - Levo gtt continued, currently @ 0.08 to maintain MAP goal of > 85  - Phos being replaced   - Hemovac drain output left drain 10cc/ right drain 60cc  - Plan for possible extubation today      Is this a stroke patient? no    Neuro:  Jaja Coma Scale  Best Eye Response: 2-->(E2) to pain  Best Motor Response: 4-->(M4) withdraws from pain  Best Verbal Response: 1-->(V1) none  Jaja Coma Scale Score: 7  Assessment Qualifiers: patient not sedated/intubated  Pupil PERRLA: yes     24hr Temp:  [97.5 °F (36.4 °C)-98.8 °F (37.1 °C)]     CV:   Rhythm: normal sinus rhythm  BP goals:   SBP < 160  MAP > 85    Resp:      Vent Mode: A/C  Set Rate: 18 BPM  Oxygen Concentration (%): 40  Vt Set: 480 mL  PEEP/CPAP: 5 cmH20    Plan: wean to extubate    GI/:     Diet/Nutrition Received: NPO  Last Bowel Movement: 12/14/23  Voiding Characteristics: external catheter    Intake/Output Summary (Last 24 hours) at 12/17/2023 0608  Last data filed at 12/17/2023 0302  Gross per 24 hour   Intake 2801.39 ml   Output 1024 ml   Net 1777.39 ml     Unmeasured Output  Urine Occurrence: 1  Stool Occurrence: 1    Labs/Accuchecks:  Recent Labs   Lab 12/17/23  0335 12/17/23  0415   WBC 20.15*  --    RBC 2.71*  --    HGB 8.2*  --    HCT 24.2* 25*     --       Recent Labs   Lab 12/17/23  0335   *   K 4.2   CO2 24      BUN 43*   CREATININE 1.1   ALKPHOS 79   ALT 42   AST 63*   BILITOT 0.6      Recent Labs   Lab 12/14/23  1809   INR 1.0   APTT 25.9      Recent Labs   Lab 12/12/23  2333   TROPONINI 0.063*       Electrolytes: Electrolytes  replaced  Accuchecks: ACHS    Gtts:   NORepinephrine bitartrate-D5W 0.08 mcg/kg/min (12/17/23 0425)    propofol 20 mcg/kg/min (12/17/23 0511)       LDA/Wounds:  Lines/Drains/Airways       Drain  Duration                  NG/OG Tube 12/14/23 0800 nasogastric Left nostril 2 days         Closed/Suction Drain 12/15/23 1721 Tube - 1 Right Back Accordion 10 Fr. 1 day         Closed/Suction Drain 12/15/23 1722 Tube - 2 Left Back Accordion 10 Fr. 1 day         Urethral Catheter 12/15/23 1636 Straight-tip;Silicone 16 Fr. 1 day              Airway  Duration                  Airway - Non-Surgical 12/16/23 1210 Endotracheal Tube <1 day              Arterial Line  Duration             Arterial Line 12/15/23 1507 Left Radial 1 day              Peripheral Intravenous Line  Duration                  Midline Catheter Insertion/Assessment  - Single Lumen 12/12/23 0215 Left basilic vein (medial side of arm) 20g x 10cm 5 days         Peripheral IV - Single Lumen 12/15/23 1540 18 G Right Other 1 day         Peripheral IV - Single Lumen 12/16/23 1731 20 G Anterior;Right Foot <1 day         Peripheral IV - Single Lumen 12/16/23 1733 18 G Anterior;Distal;Right Antecubital <1 day                  Wounds: Yes  Wound care consulted: Yes

## 2023-12-18 PROBLEM — G93.41 SEPTIC ENCEPHALOPATHY: Status: ACTIVE | Noted: 2023-12-13

## 2023-12-18 PROBLEM — G93.41 ACUTE METABOLIC ENCEPHALOPATHY: Status: ACTIVE | Noted: 2023-12-11

## 2023-12-18 LAB
ALBUMIN SERPL BCP-MCNC: 1.4 G/DL (ref 3.5–5.2)
ALP SERPL-CCNC: 88 U/L (ref 55–135)
ALT SERPL W/O P-5'-P-CCNC: 61 U/L (ref 10–44)
ANION GAP SERPL CALC-SCNC: 7 MMOL/L (ref 8–16)
ANISOCYTOSIS BLD QL SMEAR: SLIGHT
AST SERPL-CCNC: 109 U/L (ref 10–40)
BACTERIA SPEC AEROBE CULT: NO GROWTH
BASOPHILS # BLD AUTO: ABNORMAL K/UL (ref 0–0.2)
BASOPHILS NFR BLD: 0 % (ref 0–1.9)
BILIRUB SERPL-MCNC: 0.7 MG/DL (ref 0.1–1)
BUN SERPL-MCNC: 38 MG/DL (ref 8–23)
CALCIUM SERPL-MCNC: 8.8 MG/DL (ref 8.7–10.5)
CHLORIDE SERPL-SCNC: 104 MMOL/L (ref 95–110)
CO2 SERPL-SCNC: 25 MMOL/L (ref 23–29)
CREAT SERPL-MCNC: 0.9 MG/DL (ref 0.5–1.4)
DIFFERENTIAL METHOD BLD: ABNORMAL
EOSINOPHIL # BLD AUTO: ABNORMAL K/UL (ref 0–0.5)
EOSINOPHIL NFR BLD: 2 % (ref 0–8)
ERYTHROCYTE [DISTWIDTH] IN BLOOD BY AUTOMATED COUNT: 14.7 % (ref 11.5–14.5)
EST. GFR  (NO RACE VARIABLE): >60 ML/MIN/1.73 M^2
GLUCOSE SERPL-MCNC: 108 MG/DL (ref 70–110)
HCT VFR BLD AUTO: 24.2 % (ref 40–54)
HGB BLD-MCNC: 7.9 G/DL (ref 14–18)
IMM GRANULOCYTES # BLD AUTO: ABNORMAL K/UL (ref 0–0.04)
IMM GRANULOCYTES NFR BLD AUTO: ABNORMAL % (ref 0–0.5)
LYMPHOCYTES # BLD AUTO: ABNORMAL K/UL (ref 1–4.8)
LYMPHOCYTES NFR BLD: 10 % (ref 18–48)
MAGNESIUM SERPL-MCNC: 1.9 MG/DL (ref 1.6–2.6)
MCH RBC QN AUTO: 30.2 PG (ref 27–31)
MCHC RBC AUTO-ENTMCNC: 32.6 G/DL (ref 32–36)
MCV RBC AUTO: 92 FL (ref 82–98)
METAMYELOCYTES NFR BLD MANUAL: 2 %
MONOCYTES # BLD AUTO: ABNORMAL K/UL (ref 0.3–1)
MONOCYTES NFR BLD: 6 % (ref 4–15)
MYELOCYTES NFR BLD MANUAL: 2 %
NEUTROPHILS NFR BLD: 78 % (ref 38–73)
NRBC BLD-RTO: 0 /100 WBC
PHOSPHATE SERPL-MCNC: 3.3 MG/DL (ref 2.7–4.5)
PLATELET # BLD AUTO: 333 K/UL (ref 150–450)
PLATELET BLD QL SMEAR: ABNORMAL
PMV BLD AUTO: 10.2 FL (ref 9.2–12.9)
POCT GLUCOSE: 143 MG/DL (ref 70–110)
POCT GLUCOSE: 146 MG/DL (ref 70–110)
POCT GLUCOSE: 147 MG/DL (ref 70–110)
POTASSIUM SERPL-SCNC: 4.2 MMOL/L (ref 3.5–5.1)
PROT SERPL-MCNC: 6 G/DL (ref 6–8.4)
RBC # BLD AUTO: 2.62 M/UL (ref 4.6–6.2)
SODIUM SERPL-SCNC: 136 MMOL/L (ref 136–145)
WBC # BLD AUTO: 16.97 K/UL (ref 3.9–12.7)

## 2023-12-18 PROCEDURE — 99499 UNLISTED E&M SERVICE: CPT | Mod: ,,, | Performed by: NURSE PRACTITIONER

## 2023-12-18 PROCEDURE — 83735 ASSAY OF MAGNESIUM: CPT

## 2023-12-18 PROCEDURE — 63600175 PHARM REV CODE 636 W HCPCS

## 2023-12-18 PROCEDURE — 25000003 PHARM REV CODE 250: Performed by: NURSE PRACTITIONER

## 2023-12-18 PROCEDURE — 25000003 PHARM REV CODE 250: Performed by: PHYSICIAN ASSISTANT

## 2023-12-18 PROCEDURE — 84100 ASSAY OF PHOSPHORUS: CPT

## 2023-12-18 PROCEDURE — 25000003 PHARM REV CODE 250: Performed by: REGISTERED NURSE

## 2023-12-18 PROCEDURE — 92610 EVALUATE SWALLOWING FUNCTION: CPT

## 2023-12-18 PROCEDURE — 25000003 PHARM REV CODE 250: Performed by: STUDENT IN AN ORGANIZED HEALTH CARE EDUCATION/TRAINING PROGRAM

## 2023-12-18 PROCEDURE — 20600001 HC STEP DOWN PRIVATE ROOM

## 2023-12-18 PROCEDURE — 85027 COMPLETE CBC AUTOMATED: CPT

## 2023-12-18 PROCEDURE — 80053 COMPREHEN METABOLIC PANEL: CPT

## 2023-12-18 PROCEDURE — 94761 N-INVAS EAR/PLS OXIMETRY MLT: CPT

## 2023-12-18 PROCEDURE — 99291 CRITICAL CARE FIRST HOUR: CPT | Mod: ,,, | Performed by: INTERNAL MEDICINE

## 2023-12-18 PROCEDURE — 97535 SELF CARE MNGMENT TRAINING: CPT

## 2023-12-18 PROCEDURE — 85007 BL SMEAR W/DIFF WBC COUNT: CPT

## 2023-12-18 PROCEDURE — 63600175 PHARM REV CODE 636 W HCPCS: Performed by: STUDENT IN AN ORGANIZED HEALTH CARE EDUCATION/TRAINING PROGRAM

## 2023-12-18 PROCEDURE — 25000242 PHARM REV CODE 250 ALT 637 W/ HCPCS: Performed by: STUDENT IN AN ORGANIZED HEALTH CARE EDUCATION/TRAINING PROGRAM

## 2023-12-18 PROCEDURE — 99900035 HC TECH TIME PER 15 MIN (STAT)

## 2023-12-18 PROCEDURE — 99233 SBSQ HOSP IP/OBS HIGH 50: CPT | Mod: FS,,, | Performed by: PSYCHIATRY & NEUROLOGY

## 2023-12-18 PROCEDURE — 25000003 PHARM REV CODE 250

## 2023-12-18 RX ORDER — ACETAMINOPHEN 500 MG
1000 TABLET ORAL EVERY 6 HOURS PRN
Status: DISCONTINUED | OUTPATIENT
Start: 2023-12-18 | End: 2023-12-18

## 2023-12-18 RX ORDER — NOREPINEPHRINE BITARTRATE/D5W 4MG/250ML
0-.2 PLASTIC BAG, INJECTION (ML) INTRAVENOUS CONTINUOUS
Status: DISCONTINUED | OUTPATIENT
Start: 2023-12-18 | End: 2023-12-18

## 2023-12-18 RX ORDER — ACETAMINOPHEN 325 MG/1
650 TABLET ORAL EVERY 6 HOURS PRN
Status: DISCONTINUED | OUTPATIENT
Start: 2023-12-18 | End: 2023-12-22

## 2023-12-18 RX ORDER — ENOXAPARIN SODIUM 100 MG/ML
40 INJECTION SUBCUTANEOUS EVERY 24 HOURS
Status: DISCONTINUED | OUTPATIENT
Start: 2023-12-18 | End: 2023-12-22

## 2023-12-18 RX ORDER — POLYETHYLENE GLYCOL 3350 17 G/17G
17 POWDER, FOR SOLUTION ORAL DAILY
Status: DISCONTINUED | OUTPATIENT
Start: 2023-12-19 | End: 2023-12-20

## 2023-12-18 RX ORDER — HYDROCHLOROTHIAZIDE 25 MG/1
25 TABLET ORAL DAILY
COMMUNITY

## 2023-12-18 RX ADMIN — GABAPENTIN 200 MG: 100 CAPSULE ORAL at 02:12

## 2023-12-18 RX ADMIN — MUPIROCIN: 20 OINTMENT TOPICAL at 08:12

## 2023-12-18 RX ADMIN — GABAPENTIN 200 MG: 100 CAPSULE ORAL at 08:12

## 2023-12-18 RX ADMIN — MODAFINIL 200 MG: 100 TABLET ORAL at 06:12

## 2023-12-18 RX ADMIN — FLUTICASONE FUROATE AND VILANTEROL TRIFENATATE 1 PUFF: 200; 25 POWDER RESPIRATORY (INHALATION) at 08:12

## 2023-12-18 RX ADMIN — MUPIROCIN: 20 OINTMENT TOPICAL at 09:12

## 2023-12-18 RX ADMIN — VANCOMYCIN HYDROCHLORIDE 1750 MG: 500 INJECTION, POWDER, LYOPHILIZED, FOR SOLUTION INTRAVENOUS at 12:12

## 2023-12-18 RX ADMIN — ENOXAPARIN SODIUM 40 MG: 40 INJECTION SUBCUTANEOUS at 12:12

## 2023-12-18 RX ADMIN — MEROPENEM 2 G: 1 INJECTION INTRAVENOUS at 12:12

## 2023-12-18 RX ADMIN — MEROPENEM 2 G: 1 INJECTION INTRAVENOUS at 08:12

## 2023-12-18 RX ADMIN — MEROPENEM 2 G: 1 INJECTION INTRAVENOUS at 05:12

## 2023-12-18 RX ADMIN — ACETAMINOPHEN 650 MG: 325 TABLET ORAL at 08:12

## 2023-12-18 RX ADMIN — ACETAMINOPHEN 1000 MG: 500 TABLET ORAL at 06:12

## 2023-12-18 NOTE — PLAN OF CARE
[facility] Care Plan    POC reviewed with Bj Pate Jr. and pt's daughters at 1600. Pt and family verbalized understanding. Questions and concerns addressed. No acute events today. MAP goals liberalized to 65, weaned off of levo gtt. TF started. Pt progressing toward goals. Will continue to monitor. See below and flowsheets for full assessment and VS info.             Is this a stroke patient? no    Neuro:  Gause Coma Scale  Best Eye Response: 4-->(E4) spontaneous  Best Motor Response: 6-->(M6) obeys commands  Best Verbal Response: 4-->(V4) confused  Gause Coma Scale Score: 14  Assessment Qualifiers: patient not sedated/intubated, no eye obstruction present  Pupil PERRLA: yes     24 hr Temp:  [97.8 °F (36.6 °C)-98.5 °F (36.9 °C)]     CV:   Rhythm: sinus tachycardia  BP goals:   SBP < 160  MAP > 65    Resp:      Vent Mode: Spont  Set Rate: 18 BPM  Oxygen Concentration (%): 40  Vt Set: 480 mL  PEEP/CPAP: 5 cmH20  Pressure Support: 5 cmH20    Plan: N/A    GI/:     Diet/Nutrition Received: tube feeding  Last Bowel Movement: 12/18/23  Voiding Characteristics: external catheter    Intake/Output Summary (Last 24 hours) at 12/18/2023 1555  Last data filed at 12/18/2023 1505  Gross per 24 hour   Intake 1459.73 ml   Output 1558 ml   Net -98.27 ml     Unmeasured Output  Urine Occurrence: 1  Stool Occurrence: 1    Labs/Accuchecks:  Recent Labs   Lab 12/18/23  0113   WBC 16.97*   RBC 2.62*   HGB 7.9*   HCT 24.2*         Recent Labs   Lab 12/18/23  0113      K 4.2   CO2 25      BUN 38*   CREATININE 0.9   ALKPHOS 88   ALT 61*   *   BILITOT 0.7      Recent Labs   Lab 12/14/23  1809   INR 1.0   APTT 25.9      Recent Labs   Lab 12/12/23  2333   TROPONINI 0.063*       Electrolytes: N/A - electrolytes WDL  Accuchecks: ACHS    Gtts:      LDA/Wounds:  Lines/Drains/Airways       Drain  Duration                  NG/OG Tube 12/14/23 0800 nasogastric Left nostril 4 days         Closed/Suction Drain 12/15/23 1721  Tube - 1 Right Back Accordion 10 Fr. 2 days         Closed/Suction Drain 12/15/23 1722 Tube - 2 Left Back Accordion 10 Fr. 2 days    Male External Urinary Catheter 12/17/23 1128 Small 1 day              Arterial Line  Duration             Arterial Line 12/15/23 1507 Left Radial 3 days              Peripheral Intravenous Line  Duration                  Peripheral IV - Single Lumen 12/16/23 1731 20 G Anterior;Right Foot 1 day         Peripheral IV - Single Lumen 12/16/23 1733 18 G Anterior;Distal;Right Antecubital 1 day                  Wounds: Yes  Wound care consulted: Yes

## 2023-12-18 NOTE — PT/OT/SLP PROGRESS
Physical Therapy      Patient Name:  Bj Pate Jr.   MRN:  [id number]    Patient not seen today secondary to requires new PT orders s/p surgery with transfer to ICU and intubation. Pt now extubated, please re-consult once deemed medically appropriate. Will follow up as needed for PT evaluation.    12/18/2023

## 2023-12-18 NOTE — SUBJECTIVE & OBJECTIVE
Interval History:  no longer requires MAP goals, wean levo fro MAP> 65, once levo off of to step down to HM, full liquid diet, D/C Ng tube    Review of Systems   Constitutional: Negative.    HENT: Negative.     Eyes: Negative.    Respiratory: Negative.     Cardiovascular: Negative.    Gastrointestinal: Negative.    Endocrine: Negative.    Musculoskeletal: Negative.    Skin: Negative.    Neurological:  Positive for weakness.     2 systems   Objective:     Vitals:  Temp: 98.3 °F (36.8 °C)  Pulse: 109  Rhythm: sinus tachycardia  BP: 112/64  MAP (mmHg): 81  Resp: 20  SpO2: 97 %    Temp  Min: 97.8 °F (36.6 °C)  Max: 98.6 °F (37 °C)  Pulse  Min: 97  Max: 111  BP  Min: 96/51  Max: 140/70  MAP (mmHg)  Min: 68  Max: 106  Resp  Min: 15  Max: 25  SpO2  Min: 96 %  Max: 100 %  Oxygen Concentration (%)  Min: 40  Max: 40    12/17 0701 - 12/18 0700  In: 1821.6 [I.V.:420]  Out: 1188 [Urine:1100; Drains:88]   Unmeasured Output  Urine Occurrence: 1  Stool Occurrence: 1        Physical Exam  Vitals and nursing note reviewed.   Constitutional:       Appearance: Normal appearance.   HENT:      Head: Normocephalic.      Nose: Nose normal.      Mouth/Throat:      Mouth: Mucous membranes are moist.      Pharynx: Oropharynx is clear.   Eyes:      Pupils: Pupils are equal, round, and reactive to light.   Cardiovascular:      Rate and Rhythm: Tachycardia present.      Pulses: Normal pulses.      Heart sounds: Normal heart sounds.   Pulmonary:      Effort: Pulmonary effort is normal.      Breath sounds: Normal breath sounds.   Abdominal:      General: Bowel sounds are normal.      Palpations: Abdomen is soft.   Musculoskeletal:         General: Swelling present.   Skin:     General: Skin is warm and dry.      Capillary Refill: Capillary refill takes 2 to 3 seconds.   Neurological:      Mental Status: He is alert.      Comments: GCS 15  AAO X4  PERRL  Follows commands in all four extremities  Generalized weakness  RUE 4/5  LUE 4/5  RLE 2/5  LLE  2/5   Sensation Intact X4            Medications:  ContinuousNORepinephrine bitartrate-D5W, Last Rate: Stopped (12/18/23 1214)    Scheduledenoxparin, 40 mg, Q24H (prophylaxis, 1700)  fentaNYL, 50 mcg, Once  fluticasone furoate-vilanteroL, 1 puff, Daily  gabapentin, 200 mg, TID  meropenem (MERREM) IVPB, 2 g, Q8H  modafiniL, 200 mg, Before breakfast  mupirocin, , BID  [START ON 12/19/2023] polyethylene glycol, 17 g, Daily  senna-docusate 8.6-50 mg, 1 tablet, Daily  vancomycin (VANCOCIN) IV (PEDS and ADULTS), 1,750 mg, Q24H    PRNacetaminophen, 650 mg, Q6H PRN  aluminum-magnesium hydroxide-simethicone, 30 mL, QID PRN  dextrose 10%, 12.5 g, PRN  dextrose 10%, 25 g, PRN  diphenhydrAMINE, 25 mg, Q6H PRN  glucagon (human recombinant), 1 mg, PRN  glucose, 16 g, PRN  glucose, 24 g, PRN  insulin aspart U-100, 0-5 Units, Q6H PRN  levalbuterol, 0.63 mg, Q6H PRN  magnesium oxide, 800 mg, PRN  magnesium oxide, 800 mg, PRN  morphine, 2 mg, Q4H PRN  naloxone, 0.02 mg, PRN  ondansetron, 4 mg, Q8H PRN  potassium bicarbonate, 35 mEq, PRN  potassium bicarbonate, 50 mEq, PRN  potassium bicarbonate, 60 mEq, PRN  potassium, sodium phosphates, 2 packet, PRN  potassium, sodium phosphates, 2 packet, PRN  potassium, sodium phosphates, 2 packet, PRN  sodium chloride 0.9%, 10 mL, Q12H PRN  sodium chloride 0.9%, 3 mL, PRN  vancomycin - pharmacy to dose, , pharmacy to manage frequency      Today I personally reviewed pertinent medications, lines/drains/airways, imaging, cardiology results, laboratory results, microbiology results, notably:    Diet  Diet full liquid  Diet full liquid

## 2023-12-18 NOTE — PROGRESS NOTES
[facility] - Neuro Critical Care  Infectious Disease  Progress Note    Patient Name: Bj Pate Jr.  MRN: [id number]  Admission Date: 12/10/2023  Length of Stay: 7 days  Attending Physician: Monica Jefferson MD  Primary Care Provider: Jose Antonio Foster MD    Isolation Status: No active isolations  Assessment/Plan:      ID  * Acute osteomyelitis of lumbar spine  74 yo male with PMH of CVA, CKD3, HTN, DM2, COPD admitted with increasing lethargy s/p fall found to have L psoas abscess/lumbar OM s/p IR aspiration of L2/3 disc space (cx ngtd) and washout with lumbar fusion/TLIF/laminectomy with NSGY on 12/15 - post-op notable for lethargy with transfer to ICU requiring intubation and pressors. Op note with purulent material, cx and path in process, so far no growth to date. Post-op MRI revealed stable lumbar spondylodiscitis and L psoas abscess, possible cervical spondylodiscitis and bibasilar consolidations.       Recommendations:  - continue empiric vancomycin pharm to dose and meropenem pending cx data. Anticipate 6-8 wks Iv abx.  - follow up cx/path         Discitis  See AP above    GI  Psoas muscle abscess  See AP above        Anticipated Disposition: tbd    Thank you for your consult. I will follow-up with patient. Please contact us if you have any additional questions.    Rowan Lau MD  Infectious Disease  [facility] - [facility] Critical Care    Subjective:     Principal Problem:Acute osteomyelitis of lumbar spine    HPI: Mr. Pate is a 74 yo male with PMH of CVA, CKD3, HTN, DM2, COPD who presents with increasing lethargy and confusion.     Pt remains altered during assessment, history gathered per family at bedside and chart review. Per chart review, pt reported ~3 weeks of low back pain, as well as a fall leading up to presentation. Following the fall, pt reported lower extremity weakness and urinary retention. Daughter at bedside states that pt has not had any surgical procedures or injections  recently, but was treated with pain management in 2019 and had lumbar and cervical injections. Pt is retired, but was a  previously. Family denied him having pets, denied adventurous hobbies, denied raw food intake. Daughter reported recent ER admission for abdominal pain. States pt required medication for a parasite that improved his symptoms. Upon chart review, appears pt presented in August with abdominal pain, CT AP was negative for acute findings. Pt was discharged and followed up with PCP shortly after and stated his symptoms had improved.     On admission, pt was febrile to 102, and tachycardic, with increasing leukocytosis, 13->16k, stable anemia, elevated sed/CRP: >120, 375, procal 1.37. blood cultures negative on admission. MRI lumbar spine noting discitis/osteomyelitis at L2-3 with left psoas abscess (2.2cm), as well as early osteo L3-4, noting difficult study and epidural abscess was difficult to exclude. CT head without evidence of superimposed acute intracranial process. MRI brain, cervical spine, and thoracic spine pending.     NSGY following and recommended IR consult for potential psoas abscess drainage and culture. Planning L2-pelvis decompression and fusion on 12/15/23.  IR planning CT guided aspiration/drainage of psoas abscess on either 12/12 or 12/13.     Pt is currently on vanc and cefepime. ID was consulted for antibiotic recs.       Interval History:     No acute events overnight. Extubated yesterday.    Review of Systems   Constitutional:  Negative for fever.   Respiratory:  Negative for shortness of breath.    Gastrointestinal:  Negative for abdominal distention and abdominal pain.   Genitourinary:  Negative for dysuria.   Skin:  Positive for wound.   All other systems reviewed and are negative.    Objective:     Vital Signs (Most Recent):  Temp: 97.8 °F (36.6 °C) (12/18/23 0730)  Pulse: (!) 111 (12/18/23 0905)  Resp: (!) 23 (12/18/23 0905)  BP: (!) 110/56 (12/18/23  0905)  SpO2: 98 % (12/18/23 0905) Vital Signs (24h Range):  Temp:  [97.8 °F (36.6 °C)-98.6 °F (37 °C)] 97.8 °F (36.6 °C)  Pulse:  [] 111  Resp:  [15-25] 23  SpO2:  [96 %-100 %] 98 %  BP: ()/(51-95) 110/56  Arterial Line BP: ()/() 88/82     Weight: 119.7 kg (264 lb)  Body mass index is 38.99 kg/m².    Estimated Creatinine Clearance: 96.2 mL/min (based on SCr of 0.9 mg/dL).     Physical Exam  Constitutional:       General: He is not in acute distress.  HENT:      Head:      Comments: NG tube in place  Pulmonary:      Effort: Pulmonary effort is normal. No respiratory distress.   Abdominal:      General: There is no distension.      Palpations: Abdomen is soft.      Tenderness: There is no abdominal tenderness.   Genitourinary:     Comments: bryant  Musculoskeletal:      Right lower leg: No edema.      Left lower leg: No edema.      Comments: RUE edematous   Skin:     General: Skin is warm and dry.   Neurological:      Mental Status: He is disoriented.          Significant Labs:   Microbiology Results (last 7 days)       Procedure Component Value Units Date/Time    Aerobic culture [[id number]] Collected: 12/15/23 1655    Order Status: Completed Specimen: Wound from Back Updated: 12/18/23 0915     Aerobic Bacterial Culture No growth    Narrative:      2.) 2,3 Disc space    Aerobic culture [[id number]] Collected: 12/15/23 1603    Order Status: Completed Specimen: Wound from Back Updated: 12/18/23 0915     Aerobic Bacterial Culture No growth    Narrative:      Paraspinal    Aerobic culture [[id number]] Collected: 12/15/23 1657    Order Status: Completed Specimen: Wound from Back Updated: 12/18/23 0913     Aerobic Bacterial Culture No growth    Narrative:      3.) 2,3 Disc space    Blood culture [[id number]] Collected: 12/12/23 0033    Order Status: Completed Specimen: Blood Updated: 12/17/23 0612     Blood Culture, Routine No growth after 5 days.    Blood culture [[id number]] Collected: 12/12/23  0033    Order Status: Completed Specimen: Blood Updated: 12/17/23 0612     Blood Culture, Routine No growth after 5 days.    AFB Culture & Smear [[id number]] Collected: 12/15/23 1603    Order Status: Completed Specimen: Wound from Back Updated: 12/16/23 2127     AFB Culture & Smear Culture in progress    Narrative:      Paraspinal    AFB Culture & Smear [[id number]] Collected: 12/15/23 1655    Order Status: Completed Specimen: Wound from Back Updated: 12/16/23 2127     AFB Culture & Smear Culture in progress    Narrative:      2.) 2,3 Disc space    AFB Culture & Smear [[id number]] Collected: 12/15/23 1657    Order Status: Completed Specimen: Wound from Back Updated: 12/16/23 2127     AFB Culture & Smear Culture in progress    Narrative:      3.) 2,3 Disc space    Aerobic culture [[id number]] Collected: 12/13/23 1703    Order Status: Completed Specimen: Abscess from Buttocks, Left Updated: 12/16/23 1109     Aerobic Bacterial Culture No growth    Culture, Anaerobe [[id number]] Collected: 12/15/23 1655    Order Status: Completed Specimen: Wound from Back Updated: 12/16/23 0710     Anaerobic Culture Culture in progress    Narrative:      2.) 2,3 Disc space    Culture, Anaerobe [[id number]] Collected: 12/15/23 1657    Order Status: Completed Specimen: Wound from Back Updated: 12/16/23 0710     Anaerobic Culture Culture in progress    Narrative:      3.) 2,3 Disc space    Culture, Anaerobe [[id number]] Collected: 12/15/23 1603    Order Status: Completed Specimen: Wound from Back Updated: 12/16/23 0710     Anaerobic Culture Culture in progress    Narrative:      Paraspinal    Blood culture x two cultures. Draw prior to antibiotics. [[id number]] Collected: 12/10/23 2007    Order Status: Completed Specimen: Blood from Peripheral, Antecubital, Left Updated: 12/15/23 2212     Blood Culture, Routine No growth after 5 days.    Narrative:      Aerobic and anaerobic    Blood culture x two cultures. Draw prior to antibiotics.  [[id number]] Collected: 12/10/23 2007    Order Status: Completed Specimen: Blood from Peripheral, Hand, Left Updated: 12/15/23 2212     Blood Culture, Routine No growth after 5 days.    Narrative:      Aerobic and anaerobic    Gram stain [[id number]] Collected: 12/15/23 1657    Order Status: Completed Specimen: Wound from Back Updated: 12/15/23 1900     Gram Stain Result No WBC's      No organisms seen    Narrative:      3.) 2,3 Disc space    Gram stain [[id number]] Collected: 12/15/23 1655    Order Status: Completed Specimen: Wound from Back Updated: 12/15/23 1857     Gram Stain Result No WBC's      No organisms seen    Narrative:      2.) 2,3 Disc space    Gram stain [[id number]] Collected: 12/15/23 1603    Order Status: Completed Specimen: Wound from Back Updated: 12/15/23 1855     Gram Stain Result No WBC's      No organisms seen    Narrative:      Paraspinal    Fungus culture [[id number]] Collected: 12/15/23 1655    Order Status: Sent Specimen: Wound from Back Updated: 12/15/23 1730    Fungus culture [[id number]] Collected: 12/15/23 1657    Order Status: Sent Specimen: Wound from Back Updated: 12/15/23 1727    Fungus culture [[id number]] Collected: 12/15/23 1603    Order Status: Sent Specimen: Wound from Back Updated: 12/15/23 1621    Culture, Anaerobe [[id number]]     Order Status: No result Specimen: Bone from Back     Aerobic culture [[id number]]     Order Status: No result Specimen: Bone from Back     AFB Culture & Smear [[id number]]     Order Status: No result Specimen: Bone from Back     Gram stain [[id number]]     Order Status: No result Specimen: Bone from Back     Fungus culture [[id number]]     Order Status: No result Specimen: Bone from Back     Culture, Anaerobic [[id number]] Collected: 12/13/23 1703    Order Status: Completed Specimen: Abscess from Buttocks, Left Updated: 12/15/23 0707     Anaerobic Culture Culture in progress    AFB Culture & Smear [[id number]] Collected: 12/13/23 1703    Order  Status: Completed Specimen: Abscess from Buttocks, Left Updated: 12/14/23 2128     AFB Culture & Smear Culture in progress     AFB CULTURE STAIN No acid fast bacilli seen.    Gram stain [[id number]] Collected: 12/13/23 1703    Order Status: Completed Specimen: Abscess from Buttocks, Left Updated: 12/13/23 2104     Gram Stain Result No WBC's      No organisms seen    Fungus culture [[id number]] Collected: 12/13/23 1703    Order Status: Sent Specimen: Abscess from Buttocks, Left Updated: 12/13/23 1928          Pathology Results  (Last 10 years)                 07/28/23 1133  Specimen to Pathology, Surgery Gastrointestinal tract Final result    Narrative:  Pre-op Diagnosis: Diarrhea, unspecified type [R19.7]   Abnormal finding on GI tract imaging [R93.3]   Procedure(s):   EGD (ESOPHAGOGASTRODUODENOSCOPY)   COLONOSCOPY   Jar #1: Transverse colon polyp x1   Which provider would you like to cc?->HELEN AWAD   Release to patient->Immediate   Specimen total (fresh, frozen, permanent):->1               Significant Imaging: I have reviewed all pertinent imaging results/findings within the past 24 hours.    Critical care time spent on the evaluation and treatment of severe organ dysfunction, review of pertinent labs and imaging studies, discussions with consulting providers and discussions with patient/family: 30 minutes.

## 2023-12-18 NOTE — PLAN OF CARE
Problem: SLP  Goal: SLP Goal  Description: Speech Language Pathology Goals  Goals expected to be met by 12/25/23    1. Pt will tolerate thin liquids w/o overt S/S aspiration, MIN A  2. Pt will participate in ongoing assessment of swallow function to determine safest, least restrictive means of nutrition/hydration  3. Pt will participate in further assessment of cognitive-linguistic skills to determine ongoing POC needs  4. Educate Pt and family on aspiration precautions and SLP POC    Outcome: Ongoing, Progressing     SLP Bedside Swallow Evaluation initiated. Pt with minimal PO acceptance. No overt S/S aspiration with minimally accepted PO trials this service day. Patient presents with mild oral dysphagia 2/2 baseline dentition. REC: cautiously resume full liquids provided Pt is awake/alert/attentive and accepting, vital signs stable, upright with all PO, 1:! Assistance with all PO and strict aspiration precautions. ST to continue to follow to monitor tolerance and  ongoing assessment pending PO acceptance.      12/18/2023

## 2023-12-18 NOTE — PT/OT/SLP EVAL
Speech Language Pathology Evaluation  Bedside Swallow    Patient Name:  Bj Pate Jr.   MRN:  [id number]  Admitting Diagnosis: Acute osteomyelitis of lumbar spine    Recommendations:                 General Recommendations:  Dysphagia therapy and pending progress possible further assessment of cognitive-linguistic skills  Diet recommendations:  Full liquids, Thin liquids - IDDSI Level 0 ok, ongoing assessment warranted pending PO acceptance  Aspiration Precautions:  Only when vital signs stable and awake/alert/attentive and accepting, upright with all PO, Assistance with meals, Eliminate distractions, Frequent oral care, Monitor for s/s of aspiration, Small bites/sips, and Strict aspiration precautions. Continue to monitor for signs and symptoms of aspiration and discontinue oral feeding should you notice any of the following: watery eyes, reddened facial area, wet vocal quality, increased work of breathing, change in respiratory status, increased congestion, coughing, fever and/or change in level of alertness  General Precautions: Standard, aspiration, fall  Communication strategies:  go to room if call light pushed    Assessment:     Bj Pate Jr. is a 73 y.o. male with an SLP diagnosis of Dysphagia.  He presents with mild oral dysphagia 2/2 baseline dentition and dentures not present on campus this service day.  Pt with minimal PO acceptance this service jhonny and would benefit from ongoing swallow assessment to determine safest, least restrictive means nutrition/hydration.     History:     Past Medical History:   Diagnosis Date    Acute on chronic systolic heart failure 12/13/2023    Anemia 06/04/2021    Anticoagulant long-term use     Basal ganglia hemorrhage     Left basal ganglia hemorrhage with resultant right-sided hemiparesis which has resolved.     Benign hypertension with CKD (chronic kidney disease) stage III      Cataract     Chronic idiopathic gout of multiple sites     Chronic kidney disease,  stage 3     COPD (chronic obstructive pulmonary disease)     Erectile dysfunction     Gout     Hemorrhoids without complication     Hyperlipidemia     Morbid obesity     Obstructive sleep apnea on CPAP     Reactive airway disease without complication 11/12/2021    Severe sepsis 12/11/2023    Stroke 2016, 2006    Thalamic infarct, acute (right) 01/2016    Type 2 DM with CKD stage 3 and hypertension     On pravastatin for cardiovascular protection.        Past Surgical History:   Procedure Laterality Date    CARPAL TUNNEL RELEASE Left 2/19/2020    Procedure: RELEASE, CARPAL TUNNEL LEFT;  Surgeon: Maria Luisa Mccurdy MD;  Location: [facility] OR;  Service: Orthopedics;  Laterality: Left;    COLONOSCOPY N/A 7/28/2023    Procedure: COLONOSCOPY;  Surgeon: Jaylene Alvarado MD;  Location: [facility] ENDO;  Service: Endoscopy;  Laterality: N/A;    EPIDURAL STEROID INJECTION N/A 5/2/2019    Procedure: Injection, Steroid, Epidural Cervical;  Surgeon: Dariel Doan Jr., MD;  Location: [facility] ENDO;  Service: Pain Management;  Laterality: N/A;  Cervical Epidural Steroid Injection     [id number]    Arrive @ 1130; ASA; Check BG    EPIDURAL STEROID INJECTION Bilateral 5/29/2019    Procedure: Lumbar Medial Branch Blocks;  Surgeon: Dariel Doan Jr., MD;  Location: [facility];  Service: Pain Management;  Laterality: Bilateral;  Bilateral Lumbar Medial Branch Blocks L3, L4, L5    63777  [id number]    Attive @ 1030 (11 arrival request); NO Sedation; ASA; Check BG    EPIDURAL STEROID INJECTION Bilateral 7/3/2019    Procedure: Lumbar Medial Branch Blocks;  Surgeon: Dariel Doan Jr., MD;  Location: [facility] ENDO;  Service: Pain Management;  Laterality: Bilateral;  Bilateral Lumbar Medial Branch Blocks L3, L4, L5    77125  01314    Arrive @ 0930; NO Sedation; ASA; Check BG    EPIDURAL STEROID INJECTION N/A 8/7/2019    Procedure: Injection, Steroid, Epidural Cervical;  Surgeon: Dariel Doan Jr., MD;  Location: [facility];  Service: Pain  "Management;  Laterality: N/A;  Cervical Epidural Steroid Injection C7-T1    42332    Arrive @ 1115; Last ASA 7/30; Check BG    ESOPHAGOGASTRODUODENOSCOPY N/A 7/28/2023    Procedure: EGD (ESOPHAGOGASTRODUODENOSCOPY);  Surgeon: Jaylene Alvarado MD;  Location: [facility] ENDO;  Service: Endoscopy;  Laterality: N/A;  inst via portal    FUSION, SPINE, LUMBAR, TLIF, POSTERIOR APPROACH, USING PEDICLE SCREW N/A 12/15/2023    Procedure: L-1 TO L-4 FUSION, SPINE, LUMBAR, TLIF, POSTERIOR APPROACH, USING PEDICLE SCREW;  Surgeon: Rolf Rincon MD;  Location: [facility] OR [address] [place] [address];  Service: Neurosurgery;  Laterality: N/A;    LEFT HEART CATHETERIZATION Left 9/21/2021    Procedure: Left heart cath 9am start, R rad access;  Surgeon: Dariel Conner MD;  Location: [facility] CATH LAB;  Service: Cardiology;  Laterality: Left;  RN PRE OP Covid NEGATIVE ON  9-20-21.  C A    MIDLINE CATHETER PLACEMENT  12/12/2023    NO PAST SURGERIES         Social/Occupational History: Limited due to Patient with AMS and no family present\    Prior Intubation HX:  12/13/2023, 12/15/2023, 12/16/2023- 12/17/2023    Modified Barium Swallow: none prior at this facility    Chest X-Rays: 12/17/23: No significant detrimental change in cardiopulmonary status.  Mildly coarsened interstitial attenuation throughout bilateral lungs.  Probable bibasilar subsegmental atelectasis.  No new focal consolidation.  No new pleural fluid.  No pneumothorax.     Partially imaged postop change of the lumbar spine.    Prior diet: regular textures per Patient. Patient confirmed he usually eats with dentures in place    Subjective     SLP reviewed Pt with RN, RN cleared for tx  Pt presents calm  He explains, "I don't have my teeth"     Pain/Comfort:  Pain Rating 1: other (see comments) (did not rate)  Pain Addressed 1: Nurse notified    Respiratory Status: Room air    Objective:     Oral Musculature Evaluation  Oral Musculature: general weakness  Dentition: uses dentures to eat, scattered " dentition  Secretion Management: adequate  Mucosal Quality: adequate  Oral Labial Strength and Mobility: functional retraction  Lingual Strength and Mobility: functional protrusion  Volitional Cough: elicited  Volitional Swallow: elicited  Voice Prior to PO Intake: clear, low intensity    Bedside Swallow Eval:   Consistencies Assessed:  Thin liquids : ice chip x1, tsp sips x2, straw sips x3  Nectar thick liquids tsp sips x3    *Note: Pt declined additional PO presentations as SLP continued assessment    Oral Phase:   WNL    Pharyngeal Phase:   no overt clinical signs/symptoms of aspiration    Compensatory Strategies  None    Treatment: Pt found awake and upright in bed with NG in place. He was oriented to person only. Pt with decreased organization of thoughts and intermittent language of confusion and spontaneous speech and conversational level. He followed simple commands 90% of attempts provided min cues for clarification from SLP. No overt S/S aspiration with PO trials accepted. He politely declined additional trials 2/2 dislike of taste of apple sauce or pudding and dentures not present on campus. He was educated on SLP role, definition and risk of aspiration, aspiration precautions, and ST POC including need for ongoing assessment pending PO acceptance. He did not demonstrate or verbalize understanding. No family present. No additional questions. He remained upright in bed with call light in reach upon SLP exit/handoff with RN.     Goals:   Multidisciplinary Problems       SLP Goals          Problem: SLP    Goal Priority Disciplines Outcome   SLP Goal     SLP Ongoing, Progressing   Description: Speech Language Pathology Goals  Goals expected to be met by 12/25/23    1. Pt will tolerate thin liquids w/o overt S/S aspiration, MIN A  2. Pt will participate in ongoing assessment of swallow function to determine safest, least restrictive means of nutrition/hydration  3. Pt will participate in further assessment of  cognitive-linguistic skills to determine ongoing POC needs  4. Educate Pt and family on aspiration precautions and SLP POC                         Plan:     Patient to be seen:  4 x/week   Plan of Care expires:  01/17/24  Plan of Care reviewed with:  patient   SLP Follow-Up:  Yes       Discharge recommendations:   (tbd)     Time Tracking:     SLP Treatment Date:   12/18/23  Speech Start Time:  0949  Speech Stop Time:  1008     Speech Total Time (min):  19 min    Billable Minutes: Eval Swallow and Oral Function 8 and Self Care/Home Management Training 10    12/18/2023

## 2023-12-18 NOTE — ASSESSMENT & PLAN NOTE
73M CKD3, HTN, DM2, COPD and a prior right thalamic infarct (2016) who is admitted to [facility] for post operative monitoring following a L1-L4 segmental posterolateral fusion, L2-L4 laminectomy and L2-L3 TLIF for L2-L4 discitis/osteomyelitis.     S/p IR biopsy of psoas abscess and L2-L3 disc space. Cultures no growth to date.    -Admitted to [facility]  -VS/Neuro checks q1h  -MAP goal > 85  -Continue Meropenem and Vancomycin per ID recs  -Daily CMP, Mag, Phos - replete electrolytes PRN  -PT/OT/SLP as appropriate  -Provigil daily to improve wakefulness

## 2023-12-18 NOTE — PT/OT/SLP PROGRESS
Occupational Therapy      Patient Name:  Bj Pate Jr.   MRN:  [id number]    Patient not seen today secondary to Transfer to ICU. W/ intubation period. OT will require new orders for follow up. Will follow-up as appropriate.    12/18/2023

## 2023-12-18 NOTE — PROGRESS NOTES
"[facility] - Neuro Critical Care  Neurocritical Care  Progress Note    Admit Date: 12/10/2023  Service Date: 12/18/2023  Length of Stay: 7    Subjective:     Chief Complaint: Acute osteomyelitis of lumbar spine    History of Present Illness: Bj Pate Jr. is a 73 year old male with a medical history significant for CKD3, HTN, DM2, COPD and a prior right thalamic infarct (2016) who is admitted to [facility] for post operative monitoring following a L1-L4 segmental posterolateral fusion, L2-L4 laminectomy and L2-L3 TLIF. Patient initially admitted to [facility] on 12/10 for evaluation of increasing lethargy, progressive encephalopathy and lower back pain. Work up revealed L2-L4 discitis/osteomyelitis and left psoas abscess. Started on Meropenem and Vancomycin per ID recommendations. S/p IR biopsy of psoas abscess and L2-L3 disc space. Cultures no growth to date. Underwent L1-L4 segmental posterolateral fusion, L2-L4 laminectomy and L2-L3 TLIF with noted purulent material noted at surgical site sent for culture.     On admission to [facility], patient with residual anesthesia effects. Oral airway in place. Opens eyes to voice and follows some basic commands in BLE (wiggles toes). No movement noted in BUE. Per documentation, prior to surgery, patient alert and oriented with GCS 15. Dysarthric and "moves all extremities spontaneously with good tone. BUE 2/5, BLE 2/5 except DF/PF/EHL 3/5."    Hospital Course: 12/16/2023: Went for CTA because of lethargy since surgery. Did not tolerate procedure, returned to [facility]. Sedated and intubated for MRI pan spine, as patient had post surgical changes in exam. Continuing Vanc and Deo.   12/17/2023 MRI completed last night. No further surgical plans per NSGY. Provigil given to improve wakefulness, will start daily. Extubated this afternoon, no issues.  12/18/23: no longer requires MAP goals    Interval History:  no longer requires MAP goals, wean levo fro MAP> 65, once levo off of to step down to HM, " full liquid diet, D/C Ng tube    Review of Systems   Constitutional: Negative.    HENT: Negative.     Eyes: Negative.    Respiratory: Negative.     Cardiovascular: Negative.    Gastrointestinal: Negative.    Endocrine: Negative.    Musculoskeletal: Negative.    Skin: Negative.    Neurological:  Positive for weakness.     2 systems   Objective:     Vitals:  Temp: 98.3 °F (36.8 °C)  Pulse: 109  Rhythm: sinus tachycardia  BP: 112/64  MAP (mmHg): 81  Resp: 20  SpO2: 97 %    Temp  Min: 97.8 °F (36.6 °C)  Max: 98.6 °F (37 °C)  Pulse  Min: 97  Max: 111  BP  Min: 96/51  Max: 140/70  MAP (mmHg)  Min: 68  Max: 106  Resp  Min: 15  Max: 25  SpO2  Min: 96 %  Max: 100 %  Oxygen Concentration (%)  Min: 40  Max: 40    12/17 0701 - 12/18 0700  In: 1821.6 [I.V.:420]  Out: 1188 [Urine:1100; Drains:88]   Unmeasured Output  Urine Occurrence: 1  Stool Occurrence: 1        Physical Exam  Vitals and nursing note reviewed.   Constitutional:       Appearance: Normal appearance.   HENT:      Head: Normocephalic.      Nose: Nose normal.      Mouth/Throat:      Mouth: Mucous membranes are moist.      Pharynx: Oropharynx is clear.   Eyes:      Pupils: Pupils are equal, round, and reactive to light.   Cardiovascular:      Rate and Rhythm: Tachycardia present.      Pulses: Normal pulses.      Heart sounds: Normal heart sounds.   Pulmonary:      Effort: Pulmonary effort is normal.      Breath sounds: Normal breath sounds.   Abdominal:      General: Bowel sounds are normal.      Palpations: Abdomen is soft.   Musculoskeletal:         General: Swelling present.   Skin:     General: Skin is warm and dry.      Capillary Refill: Capillary refill takes 2 to 3 seconds.   Neurological:      Mental Status: He is alert.      Comments: GCS 15  AAO X4  PERRL  Follows commands in all four extremities  Generalized weakness  RUE 4/5  LUE 4/5  RLE 2/5  LLE 2/5   Sensation Intact X4            Medications:  ContinuousNORepinephrine bitartrate-D5W, Last Rate:  Stopped (12/18/23 1214)    Scheduledenoxparin, 40 mg, Q24H (prophylaxis, 1700)  fentaNYL, 50 mcg, Once  fluticasone furoate-vilanteroL, 1 puff, Daily  gabapentin, 200 mg, TID  meropenem (MERREM) IVPB, 2 g, Q8H  modafiniL, 200 mg, Before breakfast  mupirocin, , BID  [START ON 12/19/2023] polyethylene glycol, 17 g, Daily  senna-docusate 8.6-50 mg, 1 tablet, Daily  vancomycin (VANCOCIN) IV (PEDS and ADULTS), 1,750 mg, Q24H    PRNacetaminophen, 650 mg, Q6H PRN  aluminum-magnesium hydroxide-simethicone, 30 mL, QID PRN  dextrose 10%, 12.5 g, PRN  dextrose 10%, 25 g, PRN  diphenhydrAMINE, 25 mg, Q6H PRN  glucagon (human recombinant), 1 mg, PRN  glucose, 16 g, PRN  glucose, 24 g, PRN  insulin aspart U-100, 0-5 Units, Q6H PRN  levalbuterol, 0.63 mg, Q6H PRN  magnesium oxide, 800 mg, PRN  magnesium oxide, 800 mg, PRN  morphine, 2 mg, Q4H PRN  naloxone, 0.02 mg, PRN  ondansetron, 4 mg, Q8H PRN  potassium bicarbonate, 35 mEq, PRN  potassium bicarbonate, 50 mEq, PRN  potassium bicarbonate, 60 mEq, PRN  potassium, sodium phosphates, 2 packet, PRN  potassium, sodium phosphates, 2 packet, PRN  potassium, sodium phosphates, 2 packet, PRN  sodium chloride 0.9%, 10 mL, Q12H PRN  sodium chloride 0.9%, 3 mL, PRN  vancomycin - pharmacy to dose, , pharmacy to manage frequency      Today I personally reviewed pertinent medications, lines/drains/airways, imaging, cardiology results, laboratory results, microbiology results, notably:    Diet  Diet full liquid  Diet full liquid        Assessment/Plan:     Neuro  History of CVA (cerebrovascular accident)  -R thalamic, 2016    Cardiac/Vascular  Acute on chronic systolic heart failure  -EF 45-50%    Renal/  Urinary retention  - bladder scan Q6    ID  * Acute osteomyelitis of lumbar spine  73M CKD3, HTN, DM2, COPD and a prior right thalamic infarct (2016) who is admitted to [facility] for post operative monitoring following a L1-L4 segmental posterolateral fusion, L2-L4 laminectomy and L2-L3 TLIF for  L2-L4 discitis/osteomyelitis.     S/p IR biopsy of psoas abscess and L2-L3 disc space. Cultures no growth to date.    -Admitted to NCC  -VS/Neuro checks q1h  -MAP goal > 65  -Continue Meropenem and Vancomycin per ID recs  -Daily CMP, Mag, Phos - replete electrolytes PRN  -PT/OT/SLP as appropriate  -Provigil daily to improve wakefulness  - ok to step down to HM    Osteomyelitis  -ID following  -See Acute osteomyelitis of lumbar spine     Discitis  -ID following  -See Acute osteomyelitis of lumbar spine     Endocrine  Malnutrition  See Acute osteomyelitis of lumbar spine     Morbid obesity with BMI of 45.0-49.9, adult  See Acute osteomyelitis of lumbar spine     GI  Psoas muscle abscess  -S/p IR biopsy  -Treating empirically w/ ABX    Orthopedic  Back pain  -Morphine PRN   -Remaining pain meds discontinued for now d/t increased somnolence           The patient is being Prophylaxed for:  Venous Thromboembolism with: Mechanical or Chemical  Stress Ulcer with: Not Applicable   Ventilator Pneumonia with: not applicable    Activity Orders            Diet full liquid: Full Liquid starting at 12/18 1030    Elevate HOB Elevate (30-45 degrees) Elevate HOB to 30 - 45 degrees during feeding unless otherwise stated starting at 12/13 1828    Turn patient every 2 hours starting at 12/12 0400    Progressive Mobility Protocol (mobilize patient to their highest level of functioning at least twice daily) starting at 12/11 0800          Full Code  Level 3  Teri Mackenzie NP  Neurocritical Care  [facility] - Neuro Critical Care

## 2023-12-18 NOTE — SUBJECTIVE & OBJECTIVE
Interval History:    More awake and alert and best ever seen since admit. No new issues per nursing. On levo for MAP goal         Medications:  Continuous Infusions:    Scheduled Meds:   acetaminophen  1,000 mg Per NG tube Q8H    fentaNYL  50 mcg Intravenous Once    fluticasone furoate-vilanteroL  1 puff Inhalation Daily    gabapentin  200 mg Per NG tube TID    meropenem (MERREM) IVPB  2 g Intravenous Q8H    modafiniL  200 mg Per NG tube Before breakfast    mupirocin   Nasal BID    polyethylene glycol  17 g Per NG tube BID    senna-docusate 8.6-50 mg  1 tablet Per NG tube Daily    vancomycin (VANCOCIN) IV (PEDS and ADULTS)  1,750 mg Intravenous Q24H     PRN Meds:aluminum-magnesium hydroxide-simethicone, dextrose 10%, dextrose 10%, diphenhydrAMINE, glucagon (human recombinant), glucose, glucose, insulin aspart U-100, levalbuterol, magnesium oxide, magnesium oxide, morphine, naloxone, ondansetron, potassium bicarbonate, potassium bicarbonate, potassium bicarbonate, potassium, sodium phosphates, potassium, sodium phosphates, potassium, sodium phosphates, sodium chloride 0.9%, sodium chloride 0.9%, Pharmacy to dose Vancomycin consult **AND** vancomycin - pharmacy to dose     Review of Systems  Objective:     Weight: 119.7 kg (264 lb)  Body mass index is 38.99 kg/m².  Vital Signs (Most Recent):  Temp: 97.8 °F (36.6 °C) (12/18/23 0730)  Pulse: (!) 111 (12/18/23 0905)  Resp: (!) 23 (12/18/23 0905)  BP: (!) 110/56 (12/18/23 0905)  SpO2: 98 % (12/18/23 0905) Vital Signs (24h Range):  Temp:  [97.8 °F (36.6 °C)-98.6 °F (37 °C)] 97.8 °F (36.6 °C)  Pulse:  [] 111  Resp:  [15-25] 23  SpO2:  [96 %-100 %] 98 %  BP: ()/(51-95) 110/56  Arterial Line BP: ()/() 88/82     Date 12/18/23 0700 - 12/19/23 0659   Shift 5277-8738 2029-7104 5196-6042 24 Hour Total   INTAKE   I.V.(mL/kg) 46.1(0.4)   46.1(0.4)   Shift Total(mL/kg) 46.1(0.4)   46.1(0.4)   OUTPUT   Urine(mL/kg/hr) 275   275   Shift Total(mL/kg) 275(2.3)    275(2.3)   Weight (kg) 119.7 119.7 119.7 119.7                Vent Mode: Spont  Oxygen Concentration (%):  [40] 40  Resp Rate Total:  [17 br/min-19 br/min] 17 br/min  Vt Set:  [480 mL] 480 mL  PEEP/CPAP:  [5 cmH20] 5 cmH20  Pressure Support:  [5 cmH20] 5 cmH20  Mean Airway Pressure:  [7.5 cmH20-9.3 cmH20] 7.5 cmH20             Closed/Suction Drain 12/15/23 1721 Tube - 1 Right Back Accordion 10 Fr. (Active)   Dressing Status Clean;Dry;Intact 12/16/23 1101   Dressing Intervention Integrity maintained 12/16/23 1101   Drainage Sanguineous;Bright red;Bloody 12/16/23 1101   Status Other (Comment) 12/16/23 1101   Output (mL) 160 mL 12/16/23 0528            Closed/Suction Drain 12/15/23 1722 Tube - 2 Left Back Accordion 10 Fr. (Active)   Dressing Status Clean;Dry;Intact 12/16/23 1101   Dressing Intervention Integrity maintained 12/16/23 1101   Drainage Sanguineous;Bright red;Bloody 12/16/23 1101   Status Other (Comment) 12/16/23 1101   Output (mL) 70 mL 12/16/23 0528            NG/OG Tube 12/14/23 0800 nasogastric Left nostril (Active)   Placement Check placement verified by x-ray 12/16/23 1101   Tolerance no signs/symptoms of discomfort 12/16/23 1101   Securement secured to nostril center w/ adhesive device 12/16/23 1101   Clamp Status/Tolerance clamped 12/16/23 1101   Suction Setting/Drainage Method suction at the bedside 12/16/23 1101   Insertion Site Appearance no redness, warmth, tenderness, skin breakdown, drainage 12/16/23 1101   Drainage None 12/16/23 1101   Flush/Irrigation flushed w/;water;no resistance met 12/16/23 1101   Feeding Type continuous 12/14/23 1226   Feeding Action feeding held 12/16/23 1101   Current Rate (mL/hr) 10 mL/hr 12/14/23 1226   Intake (mL) 50 mL 12/15/23 0006   Water Bolus (mL) 250 mL 12/14/23 2026   Tube Output(mL)(Include Discarded Residual) 0 mL 12/15/23 0006   Intake (mL) - Formula Tube Feeding 15 12/14/23 2026            Urethral Catheter 12/15/23 1636 Straight-tip;Silicone 16 Fr.  "(Active)   Site Assessment Clean;Intact 12/16/23 1101   Collection Container Standard drainage bag 12/16/23 1101   Securement Method secured to top of thigh w/ adhesive device 12/16/23 1101   Catheter Care Performed yes 12/16/23 1101   Reason for Continuing Urinary Catheterization Post operative 12/16/23 1101   CAUTI Prevention Bundle Securement Device in place with 1" slack;Intact seal between catheter & drainage tubing;Drainage bag/urimeter off the floor;Sheeting clip in use;No dependent loops or kinks;Drainage bag/urimeter not overfilled (<2/3 full);Drainage bag/urimeter below bladder 12/16/23 1101   Output (mL) 125 mL 12/16/23 1101          Physical Exam       [id number]  OX1  Following commands   BUE 2/5 prxomal and 3/5 distal, L >R  BLE 2/5 prxomal and 3/5 distal, L >R  HV and WVAC in place     Significant Labs:  Recent Labs   Lab 12/17/23 0335 12/18/23  0113   * 108   * 136   K 4.2 4.2    104   CO2 24 25   BUN 43* 38*   CREATININE 1.1 0.9   CALCIUM 9.1 8.8   MG 2.0 1.9       Recent Labs   Lab 12/17/23  0335 12/17/23  0415 12/18/23  0113   WBC 20.15*  --  16.97*   HGB 8.2*  --  7.9*   HCT 24.2* 25* 24.2*     --  333       No results for input(s): "LABPT", "INR", "APTT" in the last 48 hours.    Microbiology Results (last 7 days)       Procedure Component Value Units Date/Time    Aerobic culture [[id number]] Collected: 12/15/23 1655    Order Status: Completed Specimen: Wound from Back Updated: 12/18/23 0915     Aerobic Bacterial Culture No growth    Narrative:      2.) 2,3 Disc space    Aerobic culture [[id number]] Collected: 12/15/23 1603    Order Status: Completed Specimen: Wound from Back Updated: 12/18/23 0915     Aerobic Bacterial Culture No growth    Narrative:      Paraspinal    Aerobic culture [[id number]] Collected: 12/15/23 1657    Order Status: Completed Specimen: Wound from Back Updated: 12/18/23 0913     Aerobic Bacterial Culture No growth    Narrative:      3.) 2,3 Disc space "    Blood culture [[id number]] Collected: 12/12/23 0033    Order Status: Completed Specimen: Blood Updated: 12/17/23 0612     Blood Culture, Routine No growth after 5 days.    Blood culture [[id number]] Collected: 12/12/23 0033    Order Status: Completed Specimen: Blood Updated: 12/17/23 0612     Blood Culture, Routine No growth after 5 days.    AFB Culture & Smear [[id number]] Collected: 12/15/23 1603    Order Status: Completed Specimen: Wound from Back Updated: 12/16/23 2127     AFB Culture & Smear Culture in progress    Narrative:      Paraspinal    AFB Culture & Smear [[id number]] Collected: 12/15/23 1655    Order Status: Completed Specimen: Wound from Back Updated: 12/16/23 2127     AFB Culture & Smear Culture in progress    Narrative:      2.) 2,3 Disc space    AFB Culture & Smear [[id number]] Collected: 12/15/23 1657    Order Status: Completed Specimen: Wound from Back Updated: 12/16/23 2127     AFB Culture & Smear Culture in progress    Narrative:      3.) 2,3 Disc space    Aerobic culture [[id number]] Collected: 12/13/23 1703    Order Status: Completed Specimen: Abscess from Buttocks, Left Updated: 12/16/23 1109     Aerobic Bacterial Culture No growth    Culture, Anaerobe [[id number]] Collected: 12/15/23 1655    Order Status: Completed Specimen: Wound from Back Updated: 12/16/23 0710     Anaerobic Culture Culture in progress    Narrative:      2.) 2,3 Disc space    Culture, Anaerobe [[id number]] Collected: 12/15/23 1657    Order Status: Completed Specimen: Wound from Back Updated: 12/16/23 0710     Anaerobic Culture Culture in progress    Narrative:      3.) 2,3 Disc space    Culture, Anaerobe [[id number]] Collected: 12/15/23 1603    Order Status: Completed Specimen: Wound from Back Updated: 12/16/23 0710     Anaerobic Culture Culture in progress    Narrative:      Paraspinal    Blood culture x two cultures. Draw prior to antibiotics. [[id number]] Collected: 12/10/23 2007    Order Status: Completed Specimen:  Blood from Peripheral, Antecubital, Left Updated: 12/15/23 2212     Blood Culture, Routine No growth after 5 days.    Narrative:      Aerobic and anaerobic    Blood culture x two cultures. Draw prior to antibiotics. [[id number]] Collected: 12/10/23 2007    Order Status: Completed Specimen: Blood from Peripheral, Hand, Left Updated: 12/15/23 2212     Blood Culture, Routine No growth after 5 days.    Narrative:      Aerobic and anaerobic    Gram stain [[id number]] Collected: 12/15/23 1657    Order Status: Completed Specimen: Wound from Back Updated: 12/15/23 1900     Gram Stain Result No WBC's      No organisms seen    Narrative:      3.) 2,3 Disc space    Gram stain [[id number]] Collected: 12/15/23 1655    Order Status: Completed Specimen: Wound from Back Updated: 12/15/23 1857     Gram Stain Result No WBC's      No organisms seen    Narrative:      2.) 2,3 Disc space    Gram stain [[id number]] Collected: 12/15/23 1603    Order Status: Completed Specimen: Wound from Back Updated: 12/15/23 1855     Gram Stain Result No WBC's      No organisms seen    Narrative:      Paraspinal    Fungus culture [[id number]] Collected: 12/15/23 1655    Order Status: Sent Specimen: Wound from Back Updated: 12/15/23 1730    Fungus culture [[id number]] Collected: 12/15/23 1657    Order Status: Sent Specimen: Wound from Back Updated: 12/15/23 1727    Fungus culture [[id number]] Collected: 12/15/23 1603    Order Status: Sent Specimen: Wound from Back Updated: 12/15/23 1621    Culture, Anaerobe [[id number]]     Order Status: No result Specimen: Bone from Back     Aerobic culture [[id number]]     Order Status: No result Specimen: Bone from Back     AFB Culture & Smear [[id number]]     Order Status: No result Specimen: Bone from Back     Gram stain [[id number]]     Order Status: No result Specimen: Bone from Back     Fungus culture [[id number]]     Order Status: No result Specimen: Bone from Back     Culture, Anaerobic [[id number]] Collected:  12/13/23 1703    Order Status: Completed Specimen: Abscess from Buttocks, Left Updated: 12/15/23 0707     Anaerobic Culture Culture in progress    AFB Culture & Smear [[id number]] Collected: 12/13/23 1703    Order Status: Completed Specimen: Abscess from Buttocks, Left Updated: 12/14/23 2128     AFB Culture & Smear Culture in progress     AFB CULTURE STAIN No acid fast bacilli seen.    Gram stain [[id number]] Collected: 12/13/23 1703    Order Status: Completed Specimen: Abscess from Buttocks, Left Updated: 12/13/23 2104     Gram Stain Result No WBC's      No organisms seen    Fungus culture [[id number]] Collected: 12/13/23 1703    Order Status: Sent Specimen: Abscess from Buttocks, Left Updated: 12/13/23 1928          All pertinent labs from the last 24 hours have been reviewed.    Significant Diagnostics:  I have reviewed and interpreted all pertinent imaging results/findings within the past 24 hours.  X-Ray Chest AP Portable    Result Date: 12/16/2023  As above Electronically signed by: Laure Reynoso MD Date:    12/16/2023 Time:    12:43    CT Head Without Contrast    Result Date: 12/16/2023  Severe motion distorted examination as detailed above essentially nondiagnostic portions of the vertex and skull base. No evidence for acute intracranial hemorrhage or sulcal effacement to suggest large territory recent infarction in the non motion distorted portion of the examination.  Clinical correlation and further evaluation with repeat attempts for imaging when patient better able to tolerate scanning advised. Electronically signed by: Maldonado Rodriguez DO Date:    12/16/2023 Time:    09:56 ]

## 2023-12-18 NOTE — PT/OT/SLP PROGRESS
Speech Language Pathology      Bj Pate Jr.  MRN: [id number]    Patient not seen today secondary to s/p surgery with transfer to ICU and intubation.  Pt now s/p extubation.  Pt will require new orders for SLP to follow.  Please re-consult as appropriate.

## 2023-12-18 NOTE — ASSESSMENT & PLAN NOTE
A 73M w/ hx CVA, CKD3, HTN, DM2, and COPD who presents with AMS likely 2/2 underling sepsis as well as progressive back pain and inability to ambulate for the past 2 weeks due to progressive L2-L3 osteodiscitis and presumed mechanical instability.          CTH 12/11: lacuanr infarcts   MRI w/wo L sp 12/11: severe central stenosis L4-5, discitis osteomyelitis at L2-3, L psoas abscess up to 2 cm  MRI C/T w/wo: very poor with alot of motion artifact, but no OM else where   US BLE 12/12: negative  12/10: , CRP >120  BCx NGTD  MRI Head, Csp, CTH 12/16: nondiagnostic  MRI C/T/L w/wo 12/16 without contribution to picture of fluctuating sensorium and motor exam; severe spondylosis in Csp with some STIR and contrast enhancement prevertebral and possibly early osteodiscitis. MRI Tsp without concerning findings and MRI Lsp with expected postop changes.      Now s/p L1-L4 decompression, L2-L3 TLIF and L1-L4 PSF on 12/15      POD#3  Subaxial cervical spondylosis which required PCF down the line  Encephalopathy is improving.  Okay to step-down back to hospital medicine from neurosurgical standpoint  Continue multimodal pain medication   Keep Hemovac drains and wound VAC in place   LSO brace when OOB  Satting well  Okay to relax MAP gaol   Advance diet as tolerated   Intraoperative cultures NGTD, continue abx per ID  DVT ppx   PT/OT and OOB   Rest of care per primary and NCC  Please notify neurosurgery on call for any acute neurologic change    Dispo: TTF to

## 2023-12-18 NOTE — SUBJECTIVE & OBJECTIVE
Interval History:     No acute events overnight. Extubated yesterday.    Review of Systems   Constitutional:  Negative for fever.   Respiratory:  Negative for shortness of breath.    Gastrointestinal:  Negative for abdominal distention and abdominal pain.   Genitourinary:  Negative for dysuria.   Skin:  Positive for wound.   All other systems reviewed and are negative.    Objective:     Vital Signs (Most Recent):  Temp: 97.8 °F (36.6 °C) (12/18/23 0730)  Pulse: (!) 111 (12/18/23 0905)  Resp: (!) 23 (12/18/23 0905)  BP: (!) 110/56 (12/18/23 0905)  SpO2: 98 % (12/18/23 0905) Vital Signs (24h Range):  Temp:  [97.8 °F (36.6 °C)-98.6 °F (37 °C)] 97.8 °F (36.6 °C)  Pulse:  [] 111  Resp:  [15-25] 23  SpO2:  [96 %-100 %] 98 %  BP: ()/(51-95) 110/56  Arterial Line BP: ()/() 88/82     Weight: 119.7 kg (264 lb)  Body mass index is 38.99 kg/m².    Estimated Creatinine Clearance: 96.2 mL/min (based on SCr of 0.9 mg/dL).     Physical Exam  Constitutional:       General: He is not in acute distress.  HENT:      Head:      Comments: NG tube in place  Pulmonary:      Effort: Pulmonary effort is normal. No respiratory distress.   Abdominal:      General: There is no distension.      Palpations: Abdomen is soft.      Tenderness: There is no abdominal tenderness.   Genitourinary:     Comments: bryant  Musculoskeletal:      Right lower leg: No edema.      Left lower leg: No edema.      Comments: RUE edematous   Skin:     General: Skin is warm and dry.   Neurological:      Mental Status: He is disoriented.          Significant Labs:   Microbiology Results (last 7 days)       Procedure Component Value Units Date/Time    Aerobic culture [[id number]] Collected: 12/15/23 1655    Order Status: Completed Specimen: Wound from Back Updated: 12/18/23 0915     Aerobic Bacterial Culture No growth    Narrative:      2.) 2,3 Disc space    Aerobic culture [[id number]] Collected: 12/15/23 1603    Order Status: Completed Specimen:  Wound from Back Updated: 12/18/23 0915     Aerobic Bacterial Culture No growth    Narrative:      Paraspinal    Aerobic culture [[id number]] Collected: 12/15/23 1657    Order Status: Completed Specimen: Wound from Back Updated: 12/18/23 0913     Aerobic Bacterial Culture No growth    Narrative:      3.) 2,3 Disc space    Blood culture [[id number]] Collected: 12/12/23 0033    Order Status: Completed Specimen: Blood Updated: 12/17/23 0612     Blood Culture, Routine No growth after 5 days.    Blood culture [[id number]] Collected: 12/12/23 0033    Order Status: Completed Specimen: Blood Updated: 12/17/23 0612     Blood Culture, Routine No growth after 5 days.    AFB Culture & Smear [[id number]] Collected: 12/15/23 1603    Order Status: Completed Specimen: Wound from Back Updated: 12/16/23 2127     AFB Culture & Smear Culture in progress    Narrative:      Paraspinal    AFB Culture & Smear [[id number]] Collected: 12/15/23 1655    Order Status: Completed Specimen: Wound from Back Updated: 12/16/23 2127     AFB Culture & Smear Culture in progress    Narrative:      2.) 2,3 Disc space    AFB Culture & Smear [[id number]] Collected: 12/15/23 1657    Order Status: Completed Specimen: Wound from Back Updated: 12/16/23 2127     AFB Culture & Smear Culture in progress    Narrative:      3.) 2,3 Disc space    Aerobic culture [[id number]] Collected: 12/13/23 1703    Order Status: Completed Specimen: Abscess from Buttocks, Left Updated: 12/16/23 1109     Aerobic Bacterial Culture No growth    Culture, Anaerobe [[id number]] Collected: 12/15/23 1655    Order Status: Completed Specimen: Wound from Back Updated: 12/16/23 0710     Anaerobic Culture Culture in progress    Narrative:      2.) 2,3 Disc space    Culture, Anaerobe [[id number]] Collected: 12/15/23 1657    Order Status: Completed Specimen: Wound from Back Updated: 12/16/23 0710     Anaerobic Culture Culture in progress    Narrative:      3.) 2,3 Disc space    Culture, Anaerobe  [[id number]] Collected: 12/15/23 1603    Order Status: Completed Specimen: Wound from Back Updated: 12/16/23 0710     Anaerobic Culture Culture in progress    Narrative:      Paraspinal    Blood culture x two cultures. Draw prior to antibiotics. [[id number]] Collected: 12/10/23 2007    Order Status: Completed Specimen: Blood from Peripheral, Antecubital, Left Updated: 12/15/23 2212     Blood Culture, Routine No growth after 5 days.    Narrative:      Aerobic and anaerobic    Blood culture x two cultures. Draw prior to antibiotics. [[id number]] Collected: 12/10/23 2007    Order Status: Completed Specimen: Blood from Peripheral, Hand, Left Updated: 12/15/23 2212     Blood Culture, Routine No growth after 5 days.    Narrative:      Aerobic and anaerobic    Gram stain [[id number]] Collected: 12/15/23 1657    Order Status: Completed Specimen: Wound from Back Updated: 12/15/23 1900     Gram Stain Result No WBC's      No organisms seen    Narrative:      3.) 2,3 Disc space    Gram stain [[id number]] Collected: 12/15/23 1655    Order Status: Completed Specimen: Wound from Back Updated: 12/15/23 1857     Gram Stain Result No WBC's      No organisms seen    Narrative:      2.) 2,3 Disc space    Gram stain [[id number]] Collected: 12/15/23 1603    Order Status: Completed Specimen: Wound from Back Updated: 12/15/23 1855     Gram Stain Result No WBC's      No organisms seen    Narrative:      Paraspinal    Fungus culture [[id number]] Collected: 12/15/23 1655    Order Status: Sent Specimen: Wound from Back Updated: 12/15/23 1730    Fungus culture [[id number]] Collected: 12/15/23 1657    Order Status: Sent Specimen: Wound from Back Updated: 12/15/23 1727    Fungus culture [[id number]] Collected: 12/15/23 1603    Order Status: Sent Specimen: Wound from Back Updated: 12/15/23 1621    Culture, Anaerobe [[id number]]     Order Status: No result Specimen: Bone from Back     Aerobic culture [[id number]]     Order Status: No result  Specimen: Bone from Back     AFB Culture & Smear [[id number]]     Order Status: No result Specimen: Bone from Back     Gram stain [[id number]]     Order Status: No result Specimen: Bone from Back     Fungus culture [[id number]]     Order Status: No result Specimen: Bone from Back     Culture, Anaerobic [[id number]] Collected: 12/13/23 1703    Order Status: Completed Specimen: Abscess from Buttocks, Left Updated: 12/15/23 0707     Anaerobic Culture Culture in progress    AFB Culture & Smear [[id number]] Collected: 12/13/23 1703    Order Status: Completed Specimen: Abscess from Buttocks, Left Updated: 12/14/23 2128     AFB Culture & Smear Culture in progress     AFB CULTURE STAIN No acid fast bacilli seen.    Gram stain [[id number]] Collected: 12/13/23 1703    Order Status: Completed Specimen: Abscess from Buttocks, Left Updated: 12/13/23 2104     Gram Stain Result No WBC's      No organisms seen    Fungus culture [[id number]] Collected: 12/13/23 1703    Order Status: Sent Specimen: Abscess from Buttocks, Left Updated: 12/13/23 1928          Pathology Results  (Last 10 years)                 07/28/23 1133  Specimen to Pathology, Surgery Gastrointestinal tract Final result    Narrative:  Pre-op Diagnosis: Diarrhea, unspecified type [R19.7]   Abnormal finding on GI tract imaging [R93.3]   Procedure(s):   EGD (ESOPHAGOGASTRODUODENOSCOPY)   COLONOSCOPY   Jar #1: Transverse colon polyp x1   Which provider would you like to cc?->HELEN AWAD   Release to patient->Immediate   Specimen total (fresh, frozen, permanent):->1               Significant Imaging: I have reviewed all pertinent imaging results/findings within the past 24 hours.

## 2023-12-18 NOTE — ASSESSMENT & PLAN NOTE
73M CKD3, HTN, DM2, COPD and a prior right thalamic infarct (2016) who is admitted to [facility] for post operative monitoring following a L1-L4 segmental posterolateral fusion, L2-L4 laminectomy and L2-L3 TLIF for L2-L4 discitis/osteomyelitis.     S/p IR biopsy of psoas abscess and L2-L3 disc space. Cultures no growth to date.    -Admitted to [facility]  -VS/Neuro checks q1h  -MAP goal > 65  -Continue Meropenem and Vancomycin per ID recs  -Daily CMP, Mag, Phos - replete electrolytes PRN  -PT/OT/SLP as appropriate  -Provigil daily to improve wakefulness  - ok to step down to HM

## 2023-12-18 NOTE — SUBJECTIVE & OBJECTIVE
Interval History:  See hospital course.    Review of Systems   Respiratory:  Negative for shortness of breath.    Cardiovascular:  Negative for chest pain.   Gastrointestinal:  Negative for abdominal pain, nausea and vomiting.   Neurological:  Positive for weakness. Negative for headaches.     Objective:     Vitals:  Temp: 98.6 °F (37 °C)  Pulse: 101  Rhythm: normal sinus rhythm  BP: 117/67  MAP (mmHg): 86  Resp: 17  SpO2: 100 %  Oxygen Concentration (%): 40  Vent Mode: Spont  Set Rate: 18 BPM  Vt Set: 480 mL  Pressure Support: 5 cmH20  PEEP/CPAP: 5 cmH20  Peak Airway Pressure: 10 cmH20  Mean Airway Pressure: 7.5 cmH20  Plateau Pressure: 0 cmH20    Temp  Min: 97.8 °F (36.6 °C)  Max: 98.8 °F (37.1 °C)  Pulse  Min: 78  Max: 108  BP  Min: 117/67  Max: 156/86  MAP (mmHg)  Min: 86  Max: 119  Resp  Min: 15  Max: 23  SpO2  Min: 97 %  Max: 100 %  Oxygen Concentration (%)  Min: 40  Max: 50    12/16 0701 - 12/17 0700  In: 3069.1 [I.V.:560.3]  Out: 1244 [Urine:1054; Drains:190]   Unmeasured Output  Urine Occurrence: 1  Stool Occurrence: 1        Physical Exam  Vitals and nursing note reviewed.   Eyes:      Pupils: Pupils are equal, round, and reactive to light.   Cardiovascular:      Rate and Rhythm: Normal rate and regular rhythm.      Pulses: Normal pulses.      Heart sounds: Normal heart sounds.   Pulmonary:      Effort: Pulmonary effort is normal.      Breath sounds: Normal breath sounds.   Abdominal:      General: Bowel sounds are normal.      Palpations: Abdomen is soft.   Skin:     General: Skin is warm and dry.      Capillary Refill: Capillary refill takes less than 2 seconds.   Neurological:      Mental Status: He is alert and oriented to person, place, and time.      GCS: GCS eye subscore is 4. GCS verbal subscore is 4. GCS motor subscore is 6.      Motor: Weakness (Generalized) present.      Comments:   -E4 V4 M6  -PERRL  -Oriented to person, place, and time  -Disoriented to situation  -Follows commands in all four  extremities  -Generalized weakness  -RUE 4/5  -LUE 4/5  -RLE 2/5  -LLE 2/5            Medications:  Continuous Scheduledacetaminophen, 975 mg, Q8H  fentaNYL, 50 mcg, Once  fluticasone furoate-vilanteroL, 1 puff, Daily  gabapentin, 200 mg, TID  meropenem (MERREM) IVPB, 2 g, Q8H  methocarbamol (ROBAXIN) IVPB, 500 mg, Q6H  [START ON 12/18/2023] modafiniL, 200 mg, Before breakfast  mupirocin, , BID  polyethylene glycol, 17 g, BID  senna-docusate 8.6-50 mg, 1 tablet, Daily  vancomycin (VANCOCIN) IV (PEDS and ADULTS), 1,750 mg, Q24H    PRNaluminum-magnesium hydroxide-simethicone, 30 mL, QID PRN  dextrose 10%, 12.5 g, PRN  dextrose 10%, 25 g, PRN  diphenhydrAMINE, 25 mg, Q6H PRN  glucagon (human recombinant), 1 mg, PRN  glucose, 16 g, PRN  glucose, 24 g, PRN  insulin aspart U-100, 0-5 Units, Q6H PRN  levalbuterol, 0.63 mg, Q6H PRN  magnesium oxide, 800 mg, PRN  magnesium oxide, 800 mg, PRN  morphine, 2 mg, Q4H PRN  naloxone, 0.02 mg, PRN  ondansetron, 4 mg, Q8H PRN  PHENYLephrine, 100 mcg, Once PRN  potassium bicarbonate, 35 mEq, PRN  potassium bicarbonate, 50 mEq, PRN  potassium bicarbonate, 60 mEq, PRN  potassium, sodium phosphates, 2 packet, PRN  potassium, sodium phosphates, 2 packet, PRN  potassium, sodium phosphates, 2 packet, PRN  sodium chloride 0.9%, 10 mL, Q12H PRN  sodium chloride 0.9%, 3 mL, PRN  vancomycin - pharmacy to dose, , pharmacy to manage frequency      Today I personally reviewed pertinent medications, lines/drains/airways, imaging, cardiology results, laboratory results,    Diet  No diet orders on file  No diet orders on file

## 2023-12-18 NOTE — ASSESSMENT & PLAN NOTE
74 yo male with PMH of CVA, CKD3, HTN, DM2, COPD admitted with increasing lethargy s/p fall found to have L psoas abscess/lumbar OM s/p IR aspiration of L2/3 disc space (cx ngtd) and washout with lumbar fusion/TLIF/laminectomy with NSGY on 12/15 - post-op notable for lethargy with transfer to ICU requiring intubation and pressors. Op note with purulent material, cx and path in process, so far no growth to date. Post-op MRI revealed stable lumbar spondylodiscitis and L psoas abscess, possible cervical spondylodiscitis and bibasilar consolidations.       Recommendations:  - continue empiric vancomycin pharm to dose and meropenem pending cx data. Anticipate 6-8 wks Iv abx.  - follow up cx/path

## 2023-12-18 NOTE — NURSING
[facility] Care Plan    POC reviewed with Bj Pate Jr. and family at 0300. Pt's sister  verbalized understanding.       - Levophed started for MAP goals> 85.  - AAOx3 - disoriented to sitaution.  - Hemovac x2 to full suction. Drain 1: 15 ccs output. Drain 2: 33 ccs output.  - Wound vac at 125 mmHg continuous.  - 2 very large Bms. Miralax held.  - O2 weaned off.  - Feeding pump ordered x 2 overnight. Still not delivered. None in RT room or dirty equipment room to be cleaned.       See below and flowsheets for full assessment and VS info.           Is this a stroke patient? no    Neuro:  Jaja Coma Scale  Best Eye Response: 3-->(E3) to speech  Best Motor Response: 6-->(M6) obeys commands  Best Verbal Response: 4-->(V4) confused  Jaja Coma Scale Score: 13  Assessment Qualifiers: patient not sedated/intubated, no eye obstruction present  Pupil PERRLA: other (see comments) (kemar)     24hr Temp:  [98.2 °F (36.8 °C)-98.6 °F (37 °C)]     CV:   Rhythm: sinus tachycardia  BP goals:   SBP < 180  MAP > 85    Resp:      Vent Mode: Spont  Set Rate: 18 BPM  Oxygen Concentration (%): 40  Vt Set: 480 mL  PEEP/CPAP: 5 cmH20  Pressure Support: 5 cmH20    Plan: N/A    GI/:     Diet/Nutrition Received: NPO  Last Bowel Movement: 12/18/23  Voiding Characteristics: external catheter, incontinence    Intake/Output Summary (Last 24 hours) at 12/18/2023 0544  Last data filed at 12/18/2023 0505  Gross per 24 hour   Intake 1738.45 ml   Output 1333 ml   Net 405.45 ml     Unmeasured Output  Urine Occurrence: 1  Stool Occurrence: 1    Labs/Accuchecks:  Recent Labs   Lab 12/18/23  0113   WBC 16.97*   RBC 2.62*   HGB 7.9*   HCT 24.2*         Recent Labs   Lab 12/18/23  0113      K 4.2   CO2 25      BUN 38*   CREATININE 0.9   ALKPHOS 88   ALT 61*   *   BILITOT 0.7      Recent Labs   Lab 12/14/23  1809   INR 1.0   APTT 25.9      Recent Labs   Lab 12/12/23  2333   TROPONINI 0.063*       Electrolytes: N/A - electrolytes  WDL  Accuchecks: ACHS    Gtts:   NORepinephrine bitartrate-D5W 0.04 mcg/kg/min (12/18/23 0505)       LDA/Wounds:  Lines/Drains/Airways       Drain  Duration                  NG/OG Tube 12/14/23 0800 nasogastric Left nostril 3 days         Closed/Suction Drain 12/15/23 1721 Tube - 1 Right Back Accordion 10 Fr. 2 days         Closed/Suction Drain 12/15/23 1722 Tube - 2 Left Back Accordion 10 Fr. 2 days    Male External Urinary Catheter 12/17/23 1128 Small <1 day              Arterial Line  Duration             Arterial Line 12/15/23 1507 Left Radial 2 days              Peripheral Intravenous Line  Duration                  Midline Catheter Insertion/Assessment  - Single Lumen 12/12/23 0215 Left basilic vein (medial side of arm) 20g x 10cm 6 days         Peripheral IV - Single Lumen 12/16/23 1731 20 G Anterior;Right Foot 1 day         Peripheral IV - Single Lumen 12/16/23 1733 18 G Anterior;Distal;Right Antecubital 1 day                  Wounds: Yes  Wound care consulted: Yes

## 2023-12-18 NOTE — PROGRESS NOTES
[facility]  Neurosurgery  Progress Note    Subjective:     History of Present Illness: Mr. Pate is a 73M w/ hx CVA, CKD3, HTN, DM2, COPD who presents with increasing lethargy and confusion. Per ED team, patient had 3 weeks of low back pain, with a fall yesterday leading to his presentation. He had subjective lower extremity weakness and urinary retention after the fall. Septic on presentation to ED. Too altered to meaningfully participate in spine motor exam at time of NSGY evaluation.     Post-Op Info:  Procedure(s) (LRB):  L-1 TO L-4 FUSION, SPINE, LUMBAR, TLIF, POSTERIOR APPROACH, USING PEDICLE SCREW (N/A)   3 Days Post-Op   Interval History:    More awake and alert and best ever seen since admit. No new issues per nursing. On levo for MAP goal         Medications:  Continuous Infusions:    Scheduled Meds:   acetaminophen  1,000 mg Per NG tube Q8H    fentaNYL  50 mcg Intravenous Once    fluticasone furoate-vilanteroL  1 puff Inhalation Daily    gabapentin  200 mg Per NG tube TID    meropenem (MERREM) IVPB  2 g Intravenous Q8H    modafiniL  200 mg Per NG tube Before breakfast    mupirocin   Nasal BID    polyethylene glycol  17 g Per NG tube BID    senna-docusate 8.6-50 mg  1 tablet Per NG tube Daily    vancomycin (VANCOCIN) IV (PEDS and ADULTS)  1,750 mg Intravenous Q24H     PRN Meds:aluminum-magnesium hydroxide-simethicone, dextrose 10%, dextrose 10%, diphenhydrAMINE, glucagon (human recombinant), glucose, glucose, insulin aspart U-100, levalbuterol, magnesium oxide, magnesium oxide, morphine, naloxone, ondansetron, potassium bicarbonate, potassium bicarbonate, potassium bicarbonate, potassium, sodium phosphates, potassium, sodium phosphates, potassium, sodium phosphates, sodium chloride 0.9%, sodium chloride 0.9%, Pharmacy to dose Vancomycin consult **AND** vancomycin - pharmacy to dose     Review of Systems  Objective:     Weight: 119.7 kg (264 lb)  Body mass index is 38.99 kg/m².  Vital  Signs (Most Recent):  Temp: 97.8 °F (36.6 °C) (12/18/23 0730)  Pulse: (!) 111 (12/18/23 0905)  Resp: (!) 23 (12/18/23 0905)  BP: (!) 110/56 (12/18/23 0905)  SpO2: 98 % (12/18/23 0905) Vital Signs (24h Range):  Temp:  [97.8 °F (36.6 °C)-98.6 °F (37 °C)] 97.8 °F (36.6 °C)  Pulse:  [] 111  Resp:  [15-25] 23  SpO2:  [96 %-100 %] 98 %  BP: ()/(51-95) 110/56  Arterial Line BP: ()/() 88/82     Date 12/18/23 0700 - 12/19/23 0659   Shift 8670-6705 1880-0104 0789-7203 24 Hour Total   INTAKE   I.V.(mL/kg) 46.1(0.4)   46.1(0.4)   Shift Total(mL/kg) 46.1(0.4)   46.1(0.4)   OUTPUT   Urine(mL/kg/hr) 275   275   Shift Total(mL/kg) 275(2.3)   275(2.3)   Weight (kg) 119.7 119.7 119.7 119.7                Vent Mode: Spont  Oxygen Concentration (%):  [40] 40  Resp Rate Total:  [17 br/min-19 br/min] 17 br/min  Vt Set:  [480 mL] 480 mL  PEEP/CPAP:  [5 cmH20] 5 cmH20  Pressure Support:  [5 cmH20] 5 cmH20  Mean Airway Pressure:  [7.5 cmH20-9.3 cmH20] 7.5 cmH20             Closed/Suction Drain 12/15/23 1721 Tube - 1 Right Back Accordion 10 Fr. (Active)   Dressing Status Clean;Dry;Intact 12/16/23 1101   Dressing Intervention Integrity maintained 12/16/23 1101   Drainage Sanguineous;Bright red;Bloody 12/16/23 1101   Status Other (Comment) 12/16/23 1101   Output (mL) 160 mL 12/16/23 0528            Closed/Suction Drain 12/15/23 1722 Tube - 2 Left Back Accordion 10 Fr. (Active)   Dressing Status Clean;Dry;Intact 12/16/23 1101   Dressing Intervention Integrity maintained 12/16/23 1101   Drainage Sanguineous;Bright red;Bloody 12/16/23 1101   Status Other (Comment) 12/16/23 1101   Output (mL) 70 mL 12/16/23 0528            NG/OG Tube 12/14/23 0800 nasogastric Left nostril (Active)   Placement Check placement verified by x-ray 12/16/23 1101   Tolerance no signs/symptoms of discomfort 12/16/23 1101   Securement secured to nostril center w/ adhesive device 12/16/23 1101   Clamp Status/Tolerance clamped 12/16/23 1101   Suction  "Setting/Drainage Method suction at the bedside 12/16/23 1101   Insertion Site Appearance no redness, warmth, tenderness, skin breakdown, drainage 12/16/23 1101   Drainage None 12/16/23 1101   Flush/Irrigation flushed w/;water;no resistance met 12/16/23 1101   Feeding Type continuous 12/14/23 1226   Feeding Action feeding held 12/16/23 1101   Current Rate (mL/hr) 10 mL/hr 12/14/23 1226   Intake (mL) 50 mL 12/15/23 0006   Water Bolus (mL) 250 mL 12/14/23 2026   Tube Output(mL)(Include Discarded Residual) 0 mL 12/15/23 0006   Intake (mL) - Formula Tube Feeding 15 12/14/23 2026            Urethral Catheter 12/15/23 1636 Straight-tip;Silicone 16 Fr. (Active)   Site Assessment Clean;Intact 12/16/23 1101   Collection Container Standard drainage bag 12/16/23 1101   Securement Method secured to top of thigh w/ adhesive device 12/16/23 1101   Catheter Care Performed yes 12/16/23 1101   Reason for Continuing Urinary Catheterization Post operative 12/16/23 1101   CAUTI Prevention Bundle Securement Device in place with 1" slack;Intact seal between catheter & drainage tubing;Drainage bag/urimeter off the floor;Sheeting clip in use;No dependent loops or kinks;Drainage bag/urimeter not overfilled (<2/3 full);Drainage bag/urimeter below bladder 12/16/23 1101   Output (mL) 125 mL 12/16/23 1101         Physical Exam       [id number]  OX1  Following commands   BUE 2/5 prxomal and 3/5 distal, L >R  BLE 2/5 prxomal and 3/5 distal, L >R  HV and WVAC in place     Significant Labs:  Recent Labs   Lab 12/17/23  0335 12/18/23  0113   * 108   * 136   K 4.2 4.2    104   CO2 24 25   BUN 43* 38*   CREATININE 1.1 0.9   CALCIUM 9.1 8.8   MG 2.0 1.9       Recent Labs   Lab 12/17/23  0335 12/17/23  0415 12/18/23  0113   WBC 20.15*  --  16.97*   HGB 8.2*  --  7.9*   HCT 24.2* 25* 24.2*     --  333       No results for input(s): "LABPT", "INR", "APTT" in the last 48 hours.    Microbiology Results (last 7 days)       Procedure " Component Value Units Date/Time    Aerobic culture [[id number]] Collected: 12/15/23 1655    Order Status: Completed Specimen: Wound from Back Updated: 12/18/23 0915     Aerobic Bacterial Culture No growth    Narrative:      2.) 2,3 Disc space    Aerobic culture [[id number]] Collected: 12/15/23 1603    Order Status: Completed Specimen: Wound from Back Updated: 12/18/23 0915     Aerobic Bacterial Culture No growth    Narrative:      Paraspinal    Aerobic culture [[id number]] Collected: 12/15/23 1657    Order Status: Completed Specimen: Wound from Back Updated: 12/18/23 0913     Aerobic Bacterial Culture No growth    Narrative:      3.) 2,3 Disc space    Blood culture [[id number]] Collected: 12/12/23 0033    Order Status: Completed Specimen: Blood Updated: 12/17/23 0612     Blood Culture, Routine No growth after 5 days.    Blood culture [[id number]] Collected: 12/12/23 0033    Order Status: Completed Specimen: Blood Updated: 12/17/23 0612     Blood Culture, Routine No growth after 5 days.    AFB Culture & Smear [[id number]] Collected: 12/15/23 1603    Order Status: Completed Specimen: Wound from Back Updated: 12/16/23 2127     AFB Culture & Smear Culture in progress    Narrative:      Paraspinal    AFB Culture & Smear [[id number]] Collected: 12/15/23 1655    Order Status: Completed Specimen: Wound from Back Updated: 12/16/23 2127     AFB Culture & Smear Culture in progress    Narrative:      2.) 2,3 Disc space    AFB Culture & Smear [[id number]] Collected: 12/15/23 1657    Order Status: Completed Specimen: Wound from Back Updated: 12/16/23 2127     AFB Culture & Smear Culture in progress    Narrative:      3.) 2,3 Disc space    Aerobic culture [[id number]] Collected: 12/13/23 1703    Order Status: Completed Specimen: Abscess from Buttocks, Left Updated: 12/16/23 1109     Aerobic Bacterial Culture No growth    Culture, Anaerobe [[id number]] Collected: 12/15/23 1655    Order Status: Completed Specimen: Wound from Back  Updated: 12/16/23 0710     Anaerobic Culture Culture in progress    Narrative:      2.) 2,3 Disc space    Culture, Anaerobe [[id number]] Collected: 12/15/23 1657    Order Status: Completed Specimen: Wound from Back Updated: 12/16/23 0710     Anaerobic Culture Culture in progress    Narrative:      3.) 2,3 Disc space    Culture, Anaerobe [[id number]] Collected: 12/15/23 1603    Order Status: Completed Specimen: Wound from Back Updated: 12/16/23 0710     Anaerobic Culture Culture in progress    Narrative:      Paraspinal    Blood culture x two cultures. Draw prior to antibiotics. [[id number]] Collected: 12/10/23 2007    Order Status: Completed Specimen: Blood from Peripheral, Antecubital, Left Updated: 12/15/23 2212     Blood Culture, Routine No growth after 5 days.    Narrative:      Aerobic and anaerobic    Blood culture x two cultures. Draw prior to antibiotics. [[id number]] Collected: 12/10/23 2007    Order Status: Completed Specimen: Blood from Peripheral, Hand, Left Updated: 12/15/23 2212     Blood Culture, Routine No growth after 5 days.    Narrative:      Aerobic and anaerobic    Gram stain [[id number]] Collected: 12/15/23 1657    Order Status: Completed Specimen: Wound from Back Updated: 12/15/23 1900     Gram Stain Result No WBC's      No organisms seen    Narrative:      3.) 2,3 Disc space    Gram stain [[id number]] Collected: 12/15/23 1655    Order Status: Completed Specimen: Wound from Back Updated: 12/15/23 1857     Gram Stain Result No WBC's      No organisms seen    Narrative:      2.) 2,3 Disc space    Gram stain [[id number]] Collected: 12/15/23 1603    Order Status: Completed Specimen: Wound from Back Updated: 12/15/23 1855     Gram Stain Result No WBC's      No organisms seen    Narrative:      Paraspinal    Fungus culture [[id number]] Collected: 12/15/23 1655    Order Status: Sent Specimen: Wound from Back Updated: 12/15/23 1730    Fungus culture [[id number]] Collected: 12/15/23 1657    Order  Status: Sent Specimen: Wound from Back Updated: 12/15/23 1727    Fungus culture [[id number]] Collected: 12/15/23 1603    Order Status: Sent Specimen: Wound from Back Updated: 12/15/23 1621    Culture, Anaerobe [[id number]]     Order Status: No result Specimen: Bone from Back     Aerobic culture [[id number]]     Order Status: No result Specimen: Bone from Back     AFB Culture & Smear [[id number]]     Order Status: No result Specimen: Bone from Back     Gram stain [[id number]]     Order Status: No result Specimen: Bone from Back     Fungus culture [[id number]]     Order Status: No result Specimen: Bone from Back     Culture, Anaerobic [[id number]] Collected: 12/13/23 1703    Order Status: Completed Specimen: Abscess from Buttocks, Left Updated: 12/15/23 0707     Anaerobic Culture Culture in progress    AFB Culture & Smear [[id number]] Collected: 12/13/23 1703    Order Status: Completed Specimen: Abscess from Buttocks, Left Updated: 12/14/23 2128     AFB Culture & Smear Culture in progress     AFB CULTURE STAIN No acid fast bacilli seen.    Gram stain [[id number]] Collected: 12/13/23 1703    Order Status: Completed Specimen: Abscess from Buttocks, Left Updated: 12/13/23 2104     Gram Stain Result No WBC's      No organisms seen    Fungus culture [[id number]] Collected: 12/13/23 1703    Order Status: Sent Specimen: Abscess from Buttocks, Left Updated: 12/13/23 1928          All pertinent labs from the last 24 hours have been reviewed.    Significant Diagnostics:  I have reviewed and interpreted all pertinent imaging results/findings within the past 24 hours.  X-Ray Chest AP Portable    Result Date: 12/16/2023  As above Electronically signed by: Laure Reynoso MD Date:    12/16/2023 Time:    12:43    CT Head Without Contrast    Result Date: 12/16/2023  Severe motion distorted examination as detailed above essentially nondiagnostic portions of the vertex and skull base. No evidence for acute intracranial hemorrhage  or sulcal effacement to suggest large territory recent infarction in the non motion distorted portion of the examination.  Clinical correlation and further evaluation with repeat attempts for imaging when patient better able to tolerate scanning advised. Electronically signed by: Maldonado Rodriguez,  Date:    12/16/2023 Time:    09:56 ]  Assessment/Plan:     * Acute osteomyelitis of lumbar spine  A 73M w/ hx CVA, CKD3, HTN, DM2, and COPD who presents with AMS likely 2/2 underling sepsis as well as progressive back pain and inability to ambulate for the past 2 weeks due to progressive L2-L3 osteodiscitis and presumed mechanical instability.          CTH 12/11: lacuanr infarcts   MRI w/wo L sp 12/11: severe central stenosis L4-5, discitis osteomyelitis at L2-3, L psoas abscess up to 2 cm  MRI C/T w/wo: very poor with alot of motion artifact, but no OM else where   US BLE 12/12: negative  12/10: , CRP >120  BCx NGTD  MRI Head, Csp, CTH 12/16: nondiagnostic  MRI C/T/L w/wo 12/16 without contribution to picture of fluctuating sensorium and motor exam; severe spondylosis in Csp with some STIR and contrast enhancement prevertebral and possibly early osteodiscitis. MRI Tsp without concerning findings and MRI Lsp with expected postop changes.      Now s/p L1-L4 decompression, L2-L3 TLIF and L1-L4 PSF on 12/15      POD#3  Subaxial cervical spondylosis which required PCF down the line  Encephalopathy is improving.  Okay to step-down back to hospital medicine from neurosurgical standpoint  Continue multimodal pain medication   Keep Hemovac drains and wound VAC in place   LSO brace when OOB  L spine xray pending  Satting well  Okay to relax MAP gaol   Advance diet as tolerated   Intraoperative cultures NGTD, continue abx per ID  DVT ppx, BRs and IS  PT/OT and OOB   Rest of care per primary and NCC  Please notify neurosurgery on call for any acute neurologic change    Dispo: TTF to             Suzan Traylor,  MD  Neurosurgery  [facility]

## 2023-12-18 NOTE — PLAN OF CARE
[facility]  Discharge Reassessment    Primary Care Provider: Jose Antonio Foster MD    Expected Discharge Date: 12/18/2023    Per MD:  Encephalopathy is improving.  Okay to step-down back to hospital medicine from neurosurgical standpoint  Continue multimodal pain medication   Keep Hemovac drains and wound VAC in place   LSO brace when OOB  L spine xray pending  Satting well  Okay to relax MAP gaol   Advance diet as tolerated     Patient not medically ready for discharge.     Discharge Plan A and Plan B have been determined by review of patient's clinical status, future medical and therapeutic needs, and coverage/benefits for post-acute care in coordination with multidisciplinary team members.     NORepinephrine bitartrate-D5W Stopped (12/18/23 1214)        Reassessment (most recent)       Discharge Reassessment - 12/18/23 1219          Discharge Reassessment    Assessment Type Discharge Planning Reassessment     Did the patient's condition or plan change since previous assessment? No     Communicated GLORIA with patient/caregiver Date not available/Unable to determine     Discharge Plan A Long-term acute care facility (LTAC)     Discharge Plan B Home Health     DME Needed Upon Discharge  other (see comments)   tbd    Transition of Care Barriers None     Why the patient remains in the hospital Requires continued medical care                   Bianca Bradford RN, CCRN-K, [facility]  Neuro-Critical Care   [phone]

## 2023-12-18 NOTE — SIGNIFICANT EVENT
Notified of MAPs persistently below goal of 85 by RN. On review, patient's Levophed was dc'd on day shift at 17:29PM.  Flowsheet data with few MAP recordings >goal.    - Updated I/O's +1.9L over 24 hours  - Last EF 45-50%  - Continued on vanc/Deo for acute osteo, discitis, and psoas abscess   - No fevers or other s/s of worsening infection  - Suspect addition of Provigil  & increased wakefulness could have contributed to BP increase in AM  - Avoiding sedating analgesic meds  - Patient normotensive, but MAP still <85  - Intend to start low-dose pressor support to raise MAP to goal and titrate off as soon as BP tolerates   - Max 0.2 (through PIV)    A-line MAP in mmHg (17:45PM to 21:45PM):  78 85 79 95 83 84 82 80 78 80 81 78 79 78 91 80     - After starting 0.02 Levo, MAPs at goal.   - Required one up-titration to 0.04.   - I/O's +446mL at shift end.   - Work toward euvolemia, +/-trial off Levo.       Sindi Barahona PA-C  Neurocritical Care  12/17/2023

## 2023-12-18 NOTE — PROGRESS NOTES
"[facility]  Neurocritical Care  Progress Note    Admit Date: 12/10/2023  Service Date: 12/17/2023  Length of Stay: 6    Subjective:     Chief Complaint: Acute osteomyelitis of lumbar spine    History of Present Illness: Bj Pate Jr. is a 73 year old male with a medical history significant for CKD3, HTN, DM2, COPD and a prior right thalamic infarct (2016) who is admitted to [facility] for post operative monitoring following a L1-L4 segmental posterolateral fusion, L2-L4 laminectomy and L2-L3 TLIF. Patient initially admitted to [facility] on 12/10 for evaluation of increasing lethargy, progressive encephalopathy and lower back pain. Work up revealed L2-L4 discitis/osteomyelitis and left psoas abscess. Started on Meropenem and Vancomycin per ID recommendations. S/p IR biopsy of psoas abscess and L2-L3 disc space. Cultures no growth to date. Underwent L1-L4 segmental posterolateral fusion, L2-L4 laminectomy and L2-L3 TLIF with noted purulent material noted at surgical site sent for culture.     On admission to [facility], patient with residual anesthesia effects. Oral airway in place. Opens eyes to voice and follows some basic commands in BLE (wiggles toes). No movement noted in BUE. Per documentation, prior to surgery, patient alert and oriented with GCS 15. Dysarthric and "moves all extremities spontaneously with good tone. BUE 2/5, BLE 2/5 except DF/PF/EHL 3/5."    Hospital Course: 12/16/2023: Went for CTA because of lethargy since surgery. Did not tolerate procedure, returned to [facility]. Sedated and intubated for MRI pan spine, as patient had post surgical changes in exam. Continuing Vanc and Deo.   12/17/2023 MRI completed last night. No further surgical plans per NSGY. Provigil given to improve wakefulness, will start daily. Extubated this afternoon, no issues.    Interval History:  See hospital course.    Review of Systems   Respiratory:  Negative for shortness of breath.    Cardiovascular:  Negative for chest " pain.   Gastrointestinal:  Negative for abdominal pain, nausea and vomiting.   Neurological:  Positive for weakness. Negative for headaches.     Objective:     Vitals:  Temp: 98.6 °F (37 °C)  Pulse: 101  Rhythm: normal sinus rhythm  BP: 117/67  MAP (mmHg): 86  Resp: 17  SpO2: 100 %  Oxygen Concentration (%): 40  Vent Mode: Spont  Set Rate: 18 BPM  Vt Set: 480 mL  Pressure Support: 5 cmH20  PEEP/CPAP: 5 cmH20  Peak Airway Pressure: 10 cmH20  Mean Airway Pressure: 7.5 cmH20  Plateau Pressure: 0 cmH20    Temp  Min: 97.8 °F (36.6 °C)  Max: 98.8 °F (37.1 °C)  Pulse  Min: 78  Max: 108  BP  Min: 117/67  Max: 156/86  MAP (mmHg)  Min: 86  Max: 119  Resp  Min: 15  Max: 23  SpO2  Min: 97 %  Max: 100 %  Oxygen Concentration (%)  Min: 40  Max: 50    12/16 0701 - 12/17 0700  In: 3069.1 [I.V.:560.3]  Out: 1244 [Urine:1054; Drains:190]   Unmeasured Output  Urine Occurrence: 1  Stool Occurrence: 1        Physical Exam  Vitals and nursing note reviewed.   Eyes:      Pupils: Pupils are equal, round, and reactive to light.   Cardiovascular:      Rate and Rhythm: Normal rate and regular rhythm.      Pulses: Normal pulses.      Heart sounds: Normal heart sounds.   Pulmonary:      Effort: Pulmonary effort is normal.      Breath sounds: Normal breath sounds.   Abdominal:      General: Bowel sounds are normal.      Palpations: Abdomen is soft.   Skin:     General: Skin is warm and dry.      Capillary Refill: Capillary refill takes less than 2 seconds.   Neurological:      Mental Status: He is alert and oriented to person, place, and time.      GCS: GCS eye subscore is 4. GCS verbal subscore is 4. GCS motor subscore is 6.      Motor: Weakness (Generalized) present.      Comments:   -E4 V4 M6  -PERRL  -Oriented to person, place, and time  -Disoriented to situation  -Follows commands in all four extremities  -Generalized weakness  -RUE 4/5  -LUE 4/5  -RLE 2/5  -LLE 2/5            Medications:  Continuous Scheduledacetaminophen, 975 mg,  Q8H  fentaNYL, 50 mcg, Once  fluticasone furoate-vilanteroL, 1 puff, Daily  gabapentin, 200 mg, TID  meropenem (MERREM) IVPB, 2 g, Q8H  methocarbamol (ROBAXIN) IVPB, 500 mg, Q6H  [START ON 12/18/2023] modafiniL, 200 mg, Before breakfast  mupirocin, , BID  polyethylene glycol, 17 g, BID  senna-docusate 8.6-50 mg, 1 tablet, Daily  vancomycin (VANCOCIN) IV (PEDS and ADULTS), 1,750 mg, Q24H    PRNaluminum-magnesium hydroxide-simethicone, 30 mL, QID PRN  dextrose 10%, 12.5 g, PRN  dextrose 10%, 25 g, PRN  diphenhydrAMINE, 25 mg, Q6H PRN  glucagon (human recombinant), 1 mg, PRN  glucose, 16 g, PRN  glucose, 24 g, PRN  insulin aspart U-100, 0-5 Units, Q6H PRN  levalbuterol, 0.63 mg, Q6H PRN  magnesium oxide, 800 mg, PRN  magnesium oxide, 800 mg, PRN  morphine, 2 mg, Q4H PRN  naloxone, 0.02 mg, PRN  ondansetron, 4 mg, Q8H PRN  PHENYLephrine, 100 mcg, Once PRN  potassium bicarbonate, 35 mEq, PRN  potassium bicarbonate, 50 mEq, PRN  potassium bicarbonate, 60 mEq, PRN  potassium, sodium phosphates, 2 packet, PRN  potassium, sodium phosphates, 2 packet, PRN  potassium, sodium phosphates, 2 packet, PRN  sodium chloride 0.9%, 10 mL, Q12H PRN  sodium chloride 0.9%, 3 mL, PRN  vancomycin - pharmacy to dose, , pharmacy to manage frequency      Today I personally reviewed pertinent medications, lines/drains/airways, imaging, cardiology results, laboratory results,    Diet  No diet orders on file  No diet orders on file    Assessment/Plan:     Neuro  AMS (altered mental status)  -Exam improved following Provigil  -Neuro checks q1h    History of CVA (cerebrovascular accident)  -R thalamic, 2016    Cardiac/Vascular  Acute on chronic systolic heart failure  -EF 45-50%    Renal/  Urinary retention  -Maintain bryant per NSGY request    ID  * Acute osteomyelitis of lumbar spine  73M CKD3, HTN, DM2, COPD and a prior right thalamic infarct (2016) who is admitted to [facility] for post operative monitoring following a L1-L4 segmental posterolateral  fusion, L2-L4 laminectomy and L2-L3 TLIF for L2-L4 discitis/osteomyelitis.     S/p IR biopsy of psoas abscess and L2-L3 disc space. Cultures no growth to date.    -Admitted to NCC  -VS/Neuro checks q1h  -MAP goal > 85  -Continue Meropenem and Vancomycin per ID recs  -Daily CMP, Mag, Phos - replete electrolytes PRN  -PT/OT/SLP as appropriate  -Provigil daily to improve wakefulness    Osteomyelitis  -ID following  -See Acute osteomyelitis of lumbar spine     Discitis  -ID following  -See Acute osteomyelitis of lumbar spine     Severe sepsis  -ID following  -See Acute osteomyelitis of lumbar spine     GI  Psoas muscle abscess  -S/p IR biopsy  -Treating empirically w/ ABX    Orthopedic  Back pain  -Morphine PRN   -Remaining pain meds discontinued for now d/t increased somnolence           The patient is being Prophylaxed for:  Venous Thromboembolism with: Mechanical  Stress Ulcer with: Not Applicable   Ventilator Pneumonia with: not applicable    Activity Orders            Elevate HOB Elevate (30-45 degrees) Elevate HOB to 30 - 45 degrees during feeding unless otherwise stated starting at 12/13 1828    Turn patient every 2 hours starting at 12/12 0400    Progressive Mobility Protocol (mobilize patient to their highest level of functioning at least twice daily) starting at 12/11 0800          Full Code    Kajal De La Fuente NP  Neurocritical Care  [facility]

## 2023-12-19 PROBLEM — R53.81 DEBILITY: Status: ACTIVE | Noted: 2020-02-26

## 2023-12-19 PROBLEM — G47.33 OSA ON CPAP: Status: ACTIVE | Noted: 2023-12-19

## 2023-12-19 LAB
ALBUMIN SERPL BCP-MCNC: 1.5 G/DL (ref 3.5–5.2)
ALP SERPL-CCNC: 103 U/L (ref 55–135)
ALT SERPL W/O P-5'-P-CCNC: 50 U/L (ref 10–44)
ANION GAP SERPL CALC-SCNC: 8 MMOL/L (ref 8–16)
ANISOCYTOSIS BLD QL SMEAR: SLIGHT
AST SERPL-CCNC: 89 U/L (ref 10–40)
BASO STIPL BLD QL SMEAR: ABNORMAL
BASOPHILS # BLD AUTO: ABNORMAL K/UL (ref 0–0.2)
BASOPHILS NFR BLD: 0 % (ref 0–1.9)
BILIRUB SERPL-MCNC: 0.7 MG/DL (ref 0.1–1)
BUN SERPL-MCNC: 33 MG/DL (ref 8–23)
CALCIUM SERPL-MCNC: 8.9 MG/DL (ref 8.7–10.5)
CHLORIDE SERPL-SCNC: 106 MMOL/L (ref 95–110)
CO2 SERPL-SCNC: 25 MMOL/L (ref 23–29)
CREAT SERPL-MCNC: 0.9 MG/DL (ref 0.5–1.4)
DIFFERENTIAL METHOD BLD: ABNORMAL
EOSINOPHIL # BLD AUTO: ABNORMAL K/UL (ref 0–0.5)
EOSINOPHIL NFR BLD: 1 % (ref 0–8)
ERYTHROCYTE [DISTWIDTH] IN BLOOD BY AUTOMATED COUNT: 14.8 % (ref 11.5–14.5)
EST. GFR  (NO RACE VARIABLE): >60 ML/MIN/1.73 M^2
GIANT PLATELETS BLD QL SMEAR: PRESENT
GLUCOSE SERPL-MCNC: 126 MG/DL (ref 70–110)
HCT VFR BLD AUTO: 23.6 % (ref 40–54)
HGB BLD-MCNC: 7.7 G/DL (ref 14–18)
HYPOCHROMIA BLD QL SMEAR: ABNORMAL
IMM GRANULOCYTES # BLD AUTO: ABNORMAL K/UL (ref 0–0.04)
IMM GRANULOCYTES NFR BLD AUTO: ABNORMAL % (ref 0–0.5)
LYMPHOCYTES # BLD AUTO: ABNORMAL K/UL (ref 1–4.8)
LYMPHOCYTES NFR BLD: 6 % (ref 18–48)
MAGNESIUM SERPL-MCNC: 1.9 MG/DL (ref 1.6–2.6)
MCH RBC QN AUTO: 29.4 PG (ref 27–31)
MCHC RBC AUTO-ENTMCNC: 32.6 G/DL (ref 32–36)
MCV RBC AUTO: 90 FL (ref 82–98)
METAMYELOCYTES NFR BLD MANUAL: 4 %
MONOCYTES # BLD AUTO: ABNORMAL K/UL (ref 0.3–1)
MONOCYTES NFR BLD: 8 % (ref 4–15)
MYELOCYTES NFR BLD MANUAL: 4 %
NEUTROPHILS NFR BLD: 72 % (ref 38–73)
NEUTS BAND NFR BLD MANUAL: 5 %
NRBC BLD-RTO: 0 /100 WBC
OVALOCYTES BLD QL SMEAR: ABNORMAL
PHOSPHATE SERPL-MCNC: 2.7 MG/DL (ref 2.7–4.5)
PLATELET # BLD AUTO: 362 K/UL (ref 150–450)
PLATELET BLD QL SMEAR: ABNORMAL
PMV BLD AUTO: 9.5 FL (ref 9.2–12.9)
POCT GLUCOSE: 109 MG/DL (ref 70–110)
POCT GLUCOSE: 168 MG/DL (ref 70–110)
POCT GLUCOSE: 175 MG/DL (ref 70–110)
POCT GLUCOSE: 261 MG/DL (ref 70–110)
POIKILOCYTOSIS BLD QL SMEAR: SLIGHT
POLYCHROMASIA BLD QL SMEAR: ABNORMAL
POTASSIUM SERPL-SCNC: 3.8 MMOL/L (ref 3.5–5.1)
PROT SERPL-MCNC: 6.3 G/DL (ref 6–8.4)
RBC # BLD AUTO: 2.62 M/UL (ref 4.6–6.2)
SCHISTOCYTES BLD QL SMEAR: ABNORMAL
SODIUM SERPL-SCNC: 139 MMOL/L (ref 136–145)
TARGETS BLD QL SMEAR: ABNORMAL
WBC # BLD AUTO: 18.47 K/UL (ref 3.9–12.7)

## 2023-12-19 PROCEDURE — 63600175 PHARM REV CODE 636 W HCPCS

## 2023-12-19 PROCEDURE — 99291 CRITICAL CARE FIRST HOUR: CPT | Mod: ,,, | Performed by: INTERNAL MEDICINE

## 2023-12-19 PROCEDURE — 20000000 HC ICU ROOM

## 2023-12-19 PROCEDURE — 63600175 PHARM REV CODE 636 W HCPCS: Performed by: STUDENT IN AN ORGANIZED HEALTH CARE EDUCATION/TRAINING PROGRAM

## 2023-12-19 PROCEDURE — 97168 OT RE-EVAL EST PLAN CARE: CPT

## 2023-12-19 PROCEDURE — 99900035 HC TECH TIME PER 15 MIN (STAT)

## 2023-12-19 PROCEDURE — 97112 NEUROMUSCULAR REEDUCATION: CPT

## 2023-12-19 PROCEDURE — 92526 ORAL FUNCTION THERAPY: CPT

## 2023-12-19 PROCEDURE — 27100171 HC OXYGEN HIGH FLOW UP TO 24 HOURS

## 2023-12-19 PROCEDURE — 25000003 PHARM REV CODE 250: Performed by: STUDENT IN AN ORGANIZED HEALTH CARE EDUCATION/TRAINING PROGRAM

## 2023-12-19 PROCEDURE — 97530 THERAPEUTIC ACTIVITIES: CPT

## 2023-12-19 PROCEDURE — A4216 STERILE WATER/SALINE, 10 ML: HCPCS | Performed by: PSYCHIATRY & NEUROLOGY

## 2023-12-19 PROCEDURE — 99233 SBSQ HOSP IP/OBS HIGH 50: CPT | Mod: FS,,, | Performed by: PSYCHIATRY & NEUROLOGY

## 2023-12-19 PROCEDURE — 25000003 PHARM REV CODE 250: Performed by: PHYSICIAN ASSISTANT

## 2023-12-19 PROCEDURE — 25000003 PHARM REV CODE 250

## 2023-12-19 PROCEDURE — 97166 OT EVAL MOD COMPLEX 45 MIN: CPT

## 2023-12-19 PROCEDURE — 76937 US GUIDE VASCULAR ACCESS: CPT

## 2023-12-19 PROCEDURE — 25000003 PHARM REV CODE 250: Performed by: PSYCHIATRY & NEUROLOGY

## 2023-12-19 PROCEDURE — 20600001 HC STEP DOWN PRIVATE ROOM

## 2023-12-19 PROCEDURE — 51798 US URINE CAPACITY MEASURE: CPT

## 2023-12-19 PROCEDURE — 36573 INSJ PICC RS&I 5 YR+: CPT

## 2023-12-19 PROCEDURE — 97162 PT EVAL MOD COMPLEX 30 MIN: CPT

## 2023-12-19 PROCEDURE — 84100 ASSAY OF PHOSPHORUS: CPT

## 2023-12-19 PROCEDURE — 85027 COMPLETE CBC AUTOMATED: CPT

## 2023-12-19 PROCEDURE — 02HV33Z INSERTION OF INFUSION DEVICE INTO SUPERIOR VENA CAVA, PERCUTANEOUS APPROACH: ICD-10-PCS | Performed by: NEUROLOGICAL SURGERY

## 2023-12-19 PROCEDURE — 94761 N-INVAS EAR/PLS OXIMETRY MLT: CPT

## 2023-12-19 PROCEDURE — C1751 CATH, INF, PER/CENT/MIDLINE: HCPCS

## 2023-12-19 PROCEDURE — 97535 SELF CARE MNGMENT TRAINING: CPT

## 2023-12-19 PROCEDURE — 83735 ASSAY OF MAGNESIUM: CPT

## 2023-12-19 PROCEDURE — 80053 COMPREHEN METABOLIC PANEL: CPT

## 2023-12-19 PROCEDURE — 94660 CPAP INITIATION&MGMT: CPT

## 2023-12-19 PROCEDURE — 94640 AIRWAY INHALATION TREATMENT: CPT

## 2023-12-19 PROCEDURE — 85007 BL SMEAR W/DIFF WBC COUNT: CPT

## 2023-12-19 PROCEDURE — 99499 UNLISTED E&M SERVICE: CPT | Mod: ,,,

## 2023-12-19 PROCEDURE — 25000003 PHARM REV CODE 250: Performed by: REGISTERED NURSE

## 2023-12-19 RX ORDER — IPRATROPIUM BROMIDE AND ALBUTEROL SULFATE 2.5; .5 MG/3ML; MG/3ML
SOLUTION RESPIRATORY (INHALATION)
Status: COMPLETED
Start: 2023-12-19 | End: 2023-12-19

## 2023-12-19 RX ORDER — SODIUM CHLORIDE 0.9 % (FLUSH) 0.9 %
10 SYRINGE (ML) INJECTION EVERY 6 HOURS
Status: DISCONTINUED | OUTPATIENT
Start: 2023-12-19 | End: 2023-12-29 | Stop reason: HOSPADM

## 2023-12-19 RX ORDER — SODIUM CHLORIDE 0.9 % (FLUSH) 0.9 %
10 SYRINGE (ML) INJECTION
Status: DISCONTINUED | OUTPATIENT
Start: 2023-12-19 | End: 2023-12-29 | Stop reason: HOSPADM

## 2023-12-19 RX ADMIN — MEROPENEM 2 G: 1 INJECTION INTRAVENOUS at 09:12

## 2023-12-19 RX ADMIN — MEROPENEM 2 G: 1 INJECTION INTRAVENOUS at 01:12

## 2023-12-19 RX ADMIN — ENOXAPARIN SODIUM 40 MG: 40 INJECTION SUBCUTANEOUS at 05:12

## 2023-12-19 RX ADMIN — MUPIROCIN: 20 OINTMENT TOPICAL at 08:12

## 2023-12-19 RX ADMIN — GABAPENTIN 200 MG: 100 CAPSULE ORAL at 09:12

## 2023-12-19 RX ADMIN — MEROPENEM 2 G: 1 INJECTION INTRAVENOUS at 05:12

## 2023-12-19 RX ADMIN — GABAPENTIN 200 MG: 100 CAPSULE ORAL at 08:12

## 2023-12-19 RX ADMIN — Medication 10 ML: at 05:12

## 2023-12-19 RX ADMIN — VANCOMYCIN HYDROCHLORIDE 1750 MG: 500 INJECTION, POWDER, LYOPHILIZED, FOR SOLUTION INTRAVENOUS at 10:12

## 2023-12-19 RX ADMIN — SODIUM CHLORIDE 500 ML: 9 INJECTION, SOLUTION INTRAVENOUS at 04:12

## 2023-12-19 RX ADMIN — INSULIN ASPART 3 UNITS: 100 INJECTION, SOLUTION INTRAVENOUS; SUBCUTANEOUS at 11:12

## 2023-12-19 RX ADMIN — MUPIROCIN: 20 OINTMENT TOPICAL at 09:12

## 2023-12-19 RX ADMIN — FLUTICASONE FUROATE AND VILANTEROL TRIFENATATE 1 PUFF: 200; 25 POWDER RESPIRATORY (INHALATION) at 08:12

## 2023-12-19 RX ADMIN — SENNOSIDES AND DOCUSATE SODIUM 1 TABLET: 8.6; 5 TABLET ORAL at 08:12

## 2023-12-19 RX ADMIN — GABAPENTIN 200 MG: 100 CAPSULE ORAL at 03:12

## 2023-12-19 RX ADMIN — MODAFINIL 200 MG: 100 TABLET ORAL at 05:12

## 2023-12-19 NOTE — SUBJECTIVE & OBJECTIVE
Interval History:    No new issues per nursing. Exam stable. HV in place with 5 and 50 as Wound vac       Medications:  Continuous Infusions:    Scheduled Meds:   acetaminophen  1,000 mg Per NG tube Q8H    fentaNYL  50 mcg Intravenous Once    fluticasone furoate-vilanteroL  1 puff Inhalation Daily    gabapentin  200 mg Per NG tube TID    meropenem (MERREM) IVPB  2 g Intravenous Q8H    modafiniL  200 mg Per NG tube Before breakfast    mupirocin   Nasal BID    polyethylene glycol  17 g Per NG tube BID    senna-docusate 8.6-50 mg  1 tablet Per NG tube Daily    vancomycin (VANCOCIN) IV (PEDS and ADULTS)  1,750 mg Intravenous Q24H     PRN Meds:aluminum-magnesium hydroxide-simethicone, dextrose 10%, dextrose 10%, diphenhydrAMINE, glucagon (human recombinant), glucose, glucose, insulin aspart U-100, levalbuterol, magnesium oxide, magnesium oxide, morphine, naloxone, ondansetron, potassium bicarbonate, potassium bicarbonate, potassium bicarbonate, potassium, sodium phosphates, potassium, sodium phosphates, potassium, sodium phosphates, sodium chloride 0.9%, sodium chloride 0.9%, Pharmacy to dose Vancomycin consult **AND** vancomycin - pharmacy to dose     Review of Systems  Objective:     Weight: 119.7 kg (264 lb)  Body mass index is 38.99 kg/m².  Vital Signs (Most Recent):  Temp: 97.8 °F (36.6 °C) (12/18/23 0730)  Pulse: (!) 111 (12/18/23 0905)  Resp: (!) 23 (12/18/23 0905)  BP: (!) 110/56 (12/18/23 0905)  SpO2: 98 % (12/18/23 0905) Vital Signs (24h Range):  Temp:  [97.8 °F (36.6 °C)-98.6 °F (37 °C)] 97.8 °F (36.6 °C)  Pulse:  [] 111  Resp:  [15-25] 23  SpO2:  [96 %-100 %] 98 %  BP: ()/(51-95) 110/56  Arterial Line BP: ()/() 88/82     Date 12/18/23 0700 - 12/19/23 0659   Shift 0198-2078 7793-7060 2777-3259 24 Hour Total   INTAKE   I.V.(mL/kg) 46.1(0.4)   46.1(0.4)   Shift Total(mL/kg) 46.1(0.4)   46.1(0.4)   OUTPUT   Urine(mL/kg/hr) 275   275   Shift Total(mL/kg) 275(2.3)   275(2.3)   Weight (kg)  119.7 119.7 119.7 119.7                Vent Mode: Spont  Oxygen Concentration (%):  [40] 40  Resp Rate Total:  [17 br/min-19 br/min] 17 br/min  Vt Set:  [480 mL] 480 mL  PEEP/CPAP:  [5 cmH20] 5 cmH20  Pressure Support:  [5 cmH20] 5 cmH20  Mean Airway Pressure:  [7.5 cmH20-9.3 cmH20] 7.5 cmH20             Closed/Suction Drain 12/15/23 1721 Tube - 1 Right Back Accordion 10 Fr. (Active)   Dressing Status Clean;Dry;Intact 12/16/23 1101   Dressing Intervention Integrity maintained 12/16/23 1101   Drainage Sanguineous;Bright red;Bloody 12/16/23 1101   Status Other (Comment) 12/16/23 1101   Output (mL) 160 mL 12/16/23 0528            Closed/Suction Drain 12/15/23 1722 Tube - 2 Left Back Accordion 10 Fr. (Active)   Dressing Status Clean;Dry;Intact 12/16/23 1101   Dressing Intervention Integrity maintained 12/16/23 1101   Drainage Sanguineous;Bright red;Bloody 12/16/23 1101   Status Other (Comment) 12/16/23 1101   Output (mL) 70 mL 12/16/23 0528            NG/OG Tube 12/14/23 0800 nasogastric Left nostril (Active)   Placement Check placement verified by x-ray 12/16/23 1101   Tolerance no signs/symptoms of discomfort 12/16/23 1101   Securement secured to nostril center w/ adhesive device 12/16/23 1101   Clamp Status/Tolerance clamped 12/16/23 1101   Suction Setting/Drainage Method suction at the bedside 12/16/23 1101   Insertion Site Appearance no redness, warmth, tenderness, skin breakdown, drainage 12/16/23 1101   Drainage None 12/16/23 1101   Flush/Irrigation flushed w/;water;no resistance met 12/16/23 1101   Feeding Type continuous 12/14/23 1226   Feeding Action feeding held 12/16/23 1101   Current Rate (mL/hr) 10 mL/hr 12/14/23 1226   Intake (mL) 50 mL 12/15/23 0006   Water Bolus (mL) 250 mL 12/14/23 2026   Tube Output(mL)(Include Discarded Residual) 0 mL 12/15/23 0006   Intake (mL) - Formula Tube Feeding 15 12/14/23 2026            Urethral Catheter 12/15/23 1636 Straight-tip;Silicone 16 Fr. (Active)   Site Assessment  "Clean;Intact 12/16/23 1101   Collection Container Standard drainage bag 12/16/23 1101   Securement Method secured to top of thigh w/ adhesive device 12/16/23 1101   Catheter Care Performed yes 12/16/23 1101   Reason for Continuing Urinary Catheterization Post operative 12/16/23 1101   CAUTI Prevention Bundle Securement Device in place with 1" slack;Intact seal between catheter & drainage tubing;Drainage bag/urimeter off the floor;Sheeting clip in use;No dependent loops or kinks;Drainage bag/urimeter not overfilled (<2/3 full);Drainage bag/urimeter below bladder 12/16/23 1101   Output (mL) 125 mL 12/16/23 1101          Physical Exam       [id number]  OX1  Following commands   BUE 2/5 prxomal and 3/5 distal, L >R  BLE 2/5 prxomal and 3/5 distal, L >R  HV and WVAC in place     Significant Labs:  Recent Labs   Lab 12/17/23  0335 12/18/23  0113   * 108   * 136   K 4.2 4.2    104   CO2 24 25   BUN 43* 38*   CREATININE 1.1 0.9   CALCIUM 9.1 8.8   MG 2.0 1.9       Recent Labs   Lab 12/17/23  0335 12/17/23  0415 12/18/23  0113   WBC 20.15*  --  16.97*   HGB 8.2*  --  7.9*   HCT 24.2* 25* 24.2*     --  333       No results for input(s): "LABPT", "INR", "APTT" in the last 48 hours.    Microbiology Results (last 7 days)       Procedure Component Value Units Date/Time    Aerobic culture [[id number]] Collected: 12/15/23 1655    Order Status: Completed Specimen: Wound from Back Updated: 12/18/23 0915     Aerobic Bacterial Culture No growth    Narrative:      2.) 2,3 Disc space    Aerobic culture [[id number]] Collected: 12/15/23 1603    Order Status: Completed Specimen: Wound from Back Updated: 12/18/23 0915     Aerobic Bacterial Culture No growth    Narrative:      Paraspinal    Aerobic culture [[id number]] Collected: 12/15/23 1657    Order Status: Completed Specimen: Wound from Back Updated: 12/18/23 0913     Aerobic Bacterial Culture No growth    Narrative:      3.) 2,3 Disc space    Blood culture " [[id number]] Collected: 12/12/23 0033    Order Status: Completed Specimen: Blood Updated: 12/17/23 0612     Blood Culture, Routine No growth after 5 days.    Blood culture [[id number]] Collected: 12/12/23 0033    Order Status: Completed Specimen: Blood Updated: 12/17/23 0612     Blood Culture, Routine No growth after 5 days.    AFB Culture & Smear [[id number]] Collected: 12/15/23 1603    Order Status: Completed Specimen: Wound from Back Updated: 12/16/23 2127     AFB Culture & Smear Culture in progress    Narrative:      Paraspinal    AFB Culture & Smear [[id number]] Collected: 12/15/23 1655    Order Status: Completed Specimen: Wound from Back Updated: 12/16/23 2127     AFB Culture & Smear Culture in progress    Narrative:      2.) 2,3 Disc space    AFB Culture & Smear [[id number]] Collected: 12/15/23 1657    Order Status: Completed Specimen: Wound from Back Updated: 12/16/23 2127     AFB Culture & Smear Culture in progress    Narrative:      3.) 2,3 Disc space    Aerobic culture [[id number]] Collected: 12/13/23 1703    Order Status: Completed Specimen: Abscess from Buttocks, Left Updated: 12/16/23 1109     Aerobic Bacterial Culture No growth    Culture, Anaerobe [[id number]] Collected: 12/15/23 1655    Order Status: Completed Specimen: Wound from Back Updated: 12/16/23 0710     Anaerobic Culture Culture in progress    Narrative:      2.) 2,3 Disc space    Culture, Anaerobe [[id number]] Collected: 12/15/23 1657    Order Status: Completed Specimen: Wound from Back Updated: 12/16/23 0710     Anaerobic Culture Culture in progress    Narrative:      3.) 2,3 Disc space    Culture, Anaerobe [[id number]] Collected: 12/15/23 1603    Order Status: Completed Specimen: Wound from Back Updated: 12/16/23 0710     Anaerobic Culture Culture in progress    Narrative:      Paraspinal    Blood culture x two cultures. Draw prior to antibiotics. [[id number]] Collected: 12/10/23 2007    Order Status: Completed Specimen: Blood from  Peripheral, Antecubital, Left Updated: 12/15/23 2212     Blood Culture, Routine No growth after 5 days.    Narrative:      Aerobic and anaerobic    Blood culture x two cultures. Draw prior to antibiotics. [[id number]] Collected: 12/10/23 2007    Order Status: Completed Specimen: Blood from Peripheral, Hand, Left Updated: 12/15/23 2212     Blood Culture, Routine No growth after 5 days.    Narrative:      Aerobic and anaerobic    Gram stain [[id number]] Collected: 12/15/23 1657    Order Status: Completed Specimen: Wound from Back Updated: 12/15/23 1900     Gram Stain Result No WBC's      No organisms seen    Narrative:      3.) 2,3 Disc space    Gram stain [[id number]] Collected: 12/15/23 1655    Order Status: Completed Specimen: Wound from Back Updated: 12/15/23 1857     Gram Stain Result No WBC's      No organisms seen    Narrative:      2.) 2,3 Disc space    Gram stain [[id number]] Collected: 12/15/23 1603    Order Status: Completed Specimen: Wound from Back Updated: 12/15/23 1855     Gram Stain Result No WBC's      No organisms seen    Narrative:      Paraspinal    Fungus culture [[id number]] Collected: 12/15/23 1655    Order Status: Sent Specimen: Wound from Back Updated: 12/15/23 1730    Fungus culture [[id number]] Collected: 12/15/23 1657    Order Status: Sent Specimen: Wound from Back Updated: 12/15/23 1727    Fungus culture [[id number]] Collected: 12/15/23 1603    Order Status: Sent Specimen: Wound from Back Updated: 12/15/23 1621    Culture, Anaerobe [[id number]]     Order Status: No result Specimen: Bone from Back     Aerobic culture [[id number]]     Order Status: No result Specimen: Bone from Back     AFB Culture & Smear [[id number]]     Order Status: No result Specimen: Bone from Back     Gram stain [[id number]]     Order Status: No result Specimen: Bone from Back     Fungus culture [[id number]]     Order Status: No result Specimen: Bone from Back     Culture, Anaerobic [[id number]] Collected: 12/13/23  1703    Order Status: Completed Specimen: Abscess from Buttocks, Left Updated: 12/15/23 0707     Anaerobic Culture Culture in progress    AFB Culture & Smear [[id number]] Collected: 12/13/23 1703    Order Status: Completed Specimen: Abscess from Buttocks, Left Updated: 12/14/23 2128     AFB Culture & Smear Culture in progress     AFB CULTURE STAIN No acid fast bacilli seen.    Gram stain [[id number]] Collected: 12/13/23 1703    Order Status: Completed Specimen: Abscess from Buttocks, Left Updated: 12/13/23 2104     Gram Stain Result No WBC's      No organisms seen    Fungus culture [[id number]] Collected: 12/13/23 1703    Order Status: Sent Specimen: Abscess from Buttocks, Left Updated: 12/13/23 1928          All pertinent labs from the last 24 hours have been reviewed.    Significant Diagnostics:  I have reviewed and interpreted all pertinent imaging results/findings within the past 24 hours.  X-Ray Chest AP Portable    Result Date: 12/16/2023  As above Electronically signed by: Laure Reynoso MD Date:    12/16/2023 Time:    12:43    CT Head Without Contrast    Result Date: 12/16/2023  Severe motion distorted examination as detailed above essentially nondiagnostic portions of the vertex and skull base. No evidence for acute intracranial hemorrhage or sulcal effacement to suggest large territory recent infarction in the non motion distorted portion of the examination.  Clinical correlation and further evaluation with repeat attempts for imaging when patient better able to tolerate scanning advised. Electronically signed by: Maldonado Rodriguez DO Date:    12/16/2023 Time:    09:56 ]

## 2023-12-19 NOTE — ASSESSMENT & PLAN NOTE
Echo 12/10/2023    Interpretation Summary    Technically difficult portable study    Left Ventricle: The left ventricle is normal in size. There is concentric remodeling. Mild global hypokinesis present. There is mildly reduced systolic function with a visually estimated ejection fraction of 45 - 50%.    Right Ventricle: Normal right ventricular cavity size. Wall thickness is normal. Right ventricle wall motion  is normal. Systolic function is normal.    Pulmonary Artery: The estimated pulmonary artery systolic pressure is at least 24 mmHg.    The IVC was not well visualized.    Back on IV lasix today, adding on losartan for GDMT

## 2023-12-19 NOTE — ASSESSMENT & PLAN NOTE
Psoas abscess  Was admitted to [facility] for post operative monitoring following a L1-L4 segmental posterolateral fusion, L2-L4 laminectomy and L2-L3 TLIF for L2-L4 discitis/osteomyelitis on 12/15.   S/p IR biopsy of psoas abscess and L2-L3 disc space.     -Continue  Meropenem and Vancomycin per ID recs

## 2023-12-19 NOTE — PLAN OF CARE
PT evaluation complete and appropriate goals established.    Problem: Physical Therapy  Goal: Physical Therapy Goal  Description: Goals to be met by: 2024     Patient will increase functional independence with mobility by performin. Supine to sit with Moderate Assistance  2. Sit to supine with Moderate Assistance  3. Sit to stand transfer with Maximum Assistance  4. Bed to chair transfer with Maximum Assistance using LRAD  5. Sitting at edge of bed x5 minutes with Minimal Assistance  6. Lower extremity exercise program x15 reps per handout, with assistance as needed    Outcome: Ongoing, Progressing     2023

## 2023-12-19 NOTE — PT/OT/SLP EVAL
Physical Therapy Co-Evaluation and Co-Treatment    Patient Name:  Bj Pate Jr.   MRN:  [id number]    Co-evaluation and co-treatment performed for this visit due to suspected patient need for two skilled therapists to ensure patient and staff safety and to accommodate for patient activity tolerance/pain management   Recommendations:     Discharge Recommendations: Moderate Intensity Therapy   Discharge Equipment Recommendations: lift device, hospital bed, wheelchair   Barriers to discharge: Increased level of assist, Inaccessible home, and Decreased caregiver support    Assessment:     Bj Pate Jr. is a 73 y.o. male admitted with a medical diagnosis of Acute osteomyelitis of lumbar spine. He presents with the following impairments/functional limitations: weakness, impaired endurance, impaired self care skills, impaired functional mobility, impaired balance, gait instability, decreased safety awareness, pain, decreased lower extremity function, decreased upper extremity function, impaired fine motor, impaired skin, edema, impaired cardiopulmonary response to activity, impaired coordination, impaired cognition, decreased ROM. Pt with poor tolerance to therapy evaluation on this date. Pt with limited eye opening during session with increased lethargy and limited participation in therapy assessment. Pt dependent for all functional mobility today 2/2 poor activity tolerance, generalized weakness, high pain levels, AMS, edema in BUE, and impaired postural stability with absent protective responses while seated at EOB. Pt becoming hypotensive while seated at EOB (, 118, 104), but unable to verbalize if feeling symptomatic. RN notified. Pt with increased WOB noted while seated at EOB; however, VSS. Recommend moderate intensity therapy following discharge once medically stable in order to reduce fall risk, reduce caregiver burden, and improve quality of life. Pt would continue to benefit from skilled acute PT in  "order to address current deficits and progress functional mobility.     Rehab Prognosis: Good; patient would benefit from acute skilled PT services 4 x/week to address these deficits and reach maximum level of function.  Recent Surgery: Procedure(s) (LRB):  L-1 TO L-4 FUSION, SPINE, LUMBAR, TLIF, POSTERIOR APPROACH, USING PEDICLE SCREW (N/A) 4 Days Post-Op    Plan:     During this hospitalization, patient to be seen 4 x/week to address the identified rehab impairments via gait training, therapeutic activities, therapeutic exercises, neuromuscular re-education and progress toward the following goals:    Plan of Care Expires:  01/19/24    Subjective     Chief Complaint: None verbalized  Patient/Family Comments/Goals: "Ouch" (with gentle tactile stimulation to BUE)  Pain/Comfort:  Pain Rating 1:  (unable to rate)  Location - Side 1: Bilateral  Location - Orientation 1: generalized  Location 1: arm  Pain Addressed 1: Reposition, Distraction    Patients cultural, spiritual, Evangelical conflicts given the current situation: no    Living Environment:  Information gathered from EMR (per CM note) as pt is unable to provide history at this time.  Pt lives in a 1 story duplex with brother next door, 1 step to enter.  Pt uses a RW, shower chair, and CPAP for HME     Objective:     Communicated with nursing prior to session. Patient found HOB elevated with blood pressure cuff, pulse ox (continuous), telemetry, SCD, hemovac, NG tube, Condom Catheter, peripheral IV, wound vac upon PT entry to room.    General Precautions: Standard, aspiration, fall  Orthopedic Precautions:N/A    Braces: N/A    Exams:  Cognitive Exam:  Patient is oriented to Person, Not oriented to place, Not oriented to time, Not oriented to situation, follows commands 25% of the time  RLE ROM: WFL  RLE Strength: grossly 2/5 at knee and ankle  LLE ROM: WFL  LLE Strength: grossly 2/5 at knee and ankle  Sensation: Intact light touch to BLEs    Functional " Mobility:  Bed Mobility:  Verbal cues for sequencing and technique  Rolling Left:  dependence of 2 persons  Rolling Right: dependence of 2 persons  Scooting: dependence of 2 persons  Supine to Sit: dependence of 2 persons for LE management and trunk management  Sit to Supine: dependence of 2 persons for LE management and trunk management  Transfers:   Deferred 2/2 poor static sitting tolerance and safety  Balance:   Static Sitting: Poor, able to maintain for 10 minute(s) with total assistance  Verbal cues for upright posture, maintenance of midline orientation, command following, visual tracking, visual attending, and improved use of BUE for support.  Dynamic Sitting: Poor: Patient unable to accept challenge or move without loss of balance, total assistance  Static Standing: not assessed this visit  Dynamic Standing: not assessed this visit    Therapeutic Activities and Exercises:  Patient educated on role of acute care PT and PT POC, safety while in hospital including calling nurse for mobility, and call light usage  Pt educated on the effects of bed rest and the importance of OOB activity. Pt encouraged to sit with bed in chair position majority of day as tolerated. Pt verbalized understanding.  Answered all questions within PT scope of practice and addressed functional mobility concerns.  Pt educated on importance of maximal participation in therapy session in order to reduce negative effects of prolonged sedentary positioning.   Pt soiled with incontinent BM at end of session. Pericare completed and all bed linens changed from assist from PT/OT/RN. Condom cath came off during pericare, RN notified.    AM-PAC 6 CLICK MOBILITY  Total Score:6     Patient left HOB elevated with all lines intact, call button in reach, RN notified, and RN present.    GOALS:   Multidisciplinary Problems       Physical Therapy Goals          Problem: Physical Therapy    Goal Priority Disciplines Outcome Goal Variances Interventions    Physical Therapy Goal     PT, PT/OT Ongoing, Progressing     Description: Goals to be met by: 2024     Patient will increase functional independence with mobility by performin. Supine to sit with Moderate Assistance  2. Sit to supine with Moderate Assistance  3. Sit to stand transfer with Maximum Assistance  4. Bed to chair transfer with Maximum Assistance using LRAD  5. Sitting at edge of bed x5 minutes with Minimal Assistance  6. Lower extremity exercise program x15 reps per handout, with assistance as needed                         History:     Past Medical History:   Diagnosis Date    Acute on chronic systolic heart failure 2023    Anemia 2021    Anticoagulant long-term use     Basal ganglia hemorrhage     Left basal ganglia hemorrhage with resultant right-sided hemiparesis which has resolved.     Benign hypertension with CKD (chronic kidney disease) stage III      Cataract     Chronic idiopathic gout of multiple sites     Chronic kidney disease, stage 3     COPD (chronic obstructive pulmonary disease)     Erectile dysfunction     Gout     Hemorrhoids without complication     Hyperlipidemia     Morbid obesity     Obstructive sleep apnea on CPAP     Reactive airway disease without complication 2021    Severe sepsis 2023    Stroke ,     Thalamic infarct, acute (right) 2016    Type 2 DM with CKD stage 3 and hypertension     On pravastatin for cardiovascular protection.        Past Surgical History:   Procedure Laterality Date    CARPAL TUNNEL RELEASE Left 2020    Procedure: RELEASE, CARPAL TUNNEL LEFT;  Surgeon: Maria Luisa Mccurdy MD;  Location: [facility] OR;  Service: Orthopedics;  Laterality: Left;    COLONOSCOPY N/A 2023    Procedure: COLONOSCOPY;  Surgeon: Jaylene Alvarado MD;  Location: [facility];  Service: Endoscopy;  Laterality: N/A;    EPIDURAL STEROID INJECTION N/A 2019    Procedure: Injection, Steroid, Epidural Cervical;  Surgeon: Dariel RODRÍGUEZ  Olimpia Gutierrez MD;  Location: [facility] ENDO;  Service: Pain Management;  Laterality: N/A;  Cervical Epidural Steroid Injection     35101    Arrive @ 1130; ASA; Check BG    EPIDURAL STEROID INJECTION Bilateral 5/29/2019    Procedure: Lumbar Medial Branch Blocks;  Surgeon: Dariel Doan Jr., MD;  Location: [facility] ENDO;  Service: Pain Management;  Laterality: Bilateral;  Bilateral Lumbar Medial Branch Blocks L3, L4, L5    20229  [id number]    Attive @ 1030 (11 arrival request); NO Sedation; ASA; Check BG    EPIDURAL STEROID INJECTION Bilateral 7/3/2019    Procedure: Lumbar Medial Branch Blocks;  Surgeon: Dariel Doan Jr., MD;  Location: [facility] ENDO;  Service: Pain Management;  Laterality: Bilateral;  Bilateral Lumbar Medial Branch Blocks L3, L4, L5    [id number]  [id number]    Arrive @ 0930; NO Sedation; ASA; Check BG    EPIDURAL STEROID INJECTION N/A 8/7/2019    Procedure: Injection, Steroid, Epidural Cervical;  Surgeon: Dariel Doan Jr., MD;  Location: [facility] ENDO;  Service: Pain Management;  Laterality: N/A;  Cervical Epidural Steroid Injection C7-T1    68286    Arrive @ 1115; Last ASA 7/30; Check BG    ESOPHAGOGASTRODUODENOSCOPY N/A 7/28/2023    Procedure: EGD (ESOPHAGOGASTRODUODENOSCOPY);  Surgeon: Jaylene Alvarado MD;  Location: [facility];  Service: Endoscopy;  Laterality: N/A;  inst via portal    FUSION, SPINE, LUMBAR, TLIF, POSTERIOR APPROACH, USING PEDICLE SCREW N/A 12/15/2023    Procedure: L-1 TO L-4 FUSION, SPINE, LUMBAR, TLIF, POSTERIOR APPROACH, USING PEDICLE SCREW;  Surgeon: Rolf Rincon MD;  Location: [facility] OR [address] [place] [address];  Service: Neurosurgery;  Laterality: N/A;    LEFT HEART CATHETERIZATION Left 9/21/2021    Procedure: Left heart cath 9am start, R rad access;  Surgeon: Dariel Conner MD;  Location: [facility] CATH LAB;  Service: Cardiology;  Laterality: Left;  RN PRE OP Covid NEGATIVE ON  9-20-21.  C A    MIDLINE CATHETER PLACEMENT  12/12/2023    NO PAST SURGERIES         Time Tracking:     PT Received On:  12/19/23  PT Start Time: 1038     PT Stop Time: 1112  PT Total Time (min): 34 min     Billable Minutes: Evaluation 10 Therapeutic Activity 14 and Neuromuscular Re-education 10      12/19/2023

## 2023-12-19 NOTE — ASSESSMENT & PLAN NOTE
A 73M w/ hx CVA, CKD3, HTN, DM2, and COPD who presents with AMS likely 2/2 underling sepsis as well as progressive back pain and inability to ambulate for the past 2 weeks due to progressive L2-L3 osteodiscitis and presumed mechanical instability.          CTH 12/11: lacuanr infarcts   MRI w/wo L sp 12/11: severe central stenosis L4-5, discitis osteomyelitis at L2-3, L psoas abscess up to 2 cm  MRI C/T w/wo: very poor with alot of motion artifact, but no OM else where   US BLE 12/12: negative  12/10: , CRP >120  BCx NGTD  MRI Head, Csp, CTH 12/16: nondiagnostic  MRI C/T/L w/wo 12/16 without contribution to picture of fluctuating sensorium and motor exam; severe spondylosis in Csp with some STIR and contrast enhancement prevertebral and possibly early osteodiscitis. MRI Tsp without concerning findings and MRI Lsp with expected postop changes.      Now s/p L1-L4 decompression, L2-L3 TLIF and L1-L4 PSF on 12/15      POD#4    Subaxial cervical spondylosis which required PCF down the line  Encephalopathy is improving.  Okay to step-down back to hospital medicine from neurosurgical standpoint pending bed  Continue multimodal pain medication   Keep Hemovac drains and wound VAC in place. Wound vac coming down tomrw  LSO brace when OOB  Satting well  MAP >65  Advance diet as tolerated   Intraoperative cultures NGTD, continue abx per ID  DVT ppx   BUE DVT US for RUE swelling and pain   PT/OT and OOB   Rest of care per primary and NCC  Please notify neurosurgery on call for any acute neurologic change    Dispo: TTF to

## 2023-12-19 NOTE — ASSESSMENT & PLAN NOTE
72 yo male with PMH of CVA, CKD3, HTN, DM2, COPD admitted with increasing lethargy s/p fall found to have L psoas abscess/lumbar OM s/p IR aspiration of L2/3 disc space (cx ngtd) and washout with lumbar fusion/TLIF/laminectomy with NSGY on 12/15 - post-op notable for lethargy with transfer to ICU requiring intubation and pressors. Op note with purulent material, cx and path in process, so far no growth to date. Post-op MRI revealed stable lumbar spondylodiscitis and L psoas abscess, possible cervical spondylodiscitis and bibasilar consolidations.       Recommendations:  - continue empiric vancomycin. pharm to dose and meropenem pending cx data. Anticipate 6-8 wks Iv abx.  - follow up cx/path

## 2023-12-19 NOTE — ASSESSMENT & PLAN NOTE
Patient admitted with tachycardia, tachypnea, fever and leukocytosis  Treatment as for acute osteomyelitis of lumbar spine

## 2023-12-19 NOTE — SUBJECTIVE & OBJECTIVE
Interval History:     No acute events overnight. More somnolent today. Family at bedside.     Review of Systems   Constitutional:  Negative for fever.   Respiratory:  Negative for shortness of breath.    Gastrointestinal:  Negative for abdominal distention and abdominal pain.   Genitourinary:  Negative for dysuria.   Skin:  Positive for wound.   All other systems reviewed and are negative.    Objective:     Vital Signs (Most Recent):  Temp: 98.6 °F (37 °C) (12/19/23 1505)  Pulse: 103 (12/19/23 1505)  Resp: 19 (12/19/23 1505)  BP: 123/65 (12/19/23 1505)  SpO2: 98 % (12/19/23 1505) Vital Signs (24h Range):  Temp:  [98 °F (36.7 °C)-99.4 °F (37.4 °C)] 98.6 °F (37 °C)  Pulse:  [103-115] 103  Resp:  [15-24] 19  SpO2:  [96 %-99 %] 98 %  BP: ()/(53-81) 123/65     Weight: 119.7 kg (264 lb)  Body mass index is 38.99 kg/m².    Estimated Creatinine Clearance: 96.2 mL/min (based on SCr of 0.9 mg/dL).     Physical Exam  Constitutional:       General: He is not in acute distress.  HENT:      Head:      Comments: NG tube in place  Pulmonary:      Effort: Pulmonary effort is normal. No respiratory distress.   Abdominal:      General: There is no distension.      Palpations: Abdomen is soft.      Tenderness: There is no abdominal tenderness.   Genitourinary:     Comments: bryant  Musculoskeletal:      Right lower leg: No edema.      Left lower leg: No edema.      Comments: RUE edematous   Skin:     General: Skin is warm and dry.          Significant Labs:   Microbiology Results (last 7 days)       Procedure Component Value Units Date/Time    Culture, Anaerobe [[id number]] Collected: 12/15/23 1657    Order Status: Completed Specimen: Wound from Back Updated: 12/19/23 0852     Anaerobic Culture Culture in progress    Narrative:      3.) 2,3 Disc space    Culture, Anaerobe [[id number]] Collected: 12/15/23 1655    Order Status: Completed Specimen: Wound from Back Updated: 12/19/23 0851     Anaerobic Culture Culture in progress     Narrative:      2.) 2,3 Disc space    Culture, Anaerobe [[id number]] Collected: 12/15/23 1603    Order Status: Completed Specimen: Wound from Back Updated: 12/19/23 0850     Anaerobic Culture Culture in progress    Narrative:      Paraspinal    AFB Culture & Smear [[id number]] Collected: 12/15/23 1655    Order Status: Completed Specimen: Wound from Back Updated: 12/18/23 1436     AFB Culture & Smear Culture in progress     AFB CULTURE STAIN No acid fast bacilli seen.    Narrative:      2.) 2,3 Disc space    AFB Culture & Smear [[id number]] Collected: 12/15/23 1657    Order Status: Completed Specimen: Wound from Back Updated: 12/18/23 1436     AFB Culture & Smear Culture in progress     AFB CULTURE STAIN No acid fast bacilli seen.    Narrative:      3.) 2,3 Disc space    AFB Culture & Smear [[id number]] Collected: 12/15/23 1603    Order Status: Completed Specimen: Wound from Back Updated: 12/18/23 1436     AFB Culture & Smear Culture in progress     AFB CULTURE STAIN No acid fast bacilli seen.    Narrative:      Paraspinal    Culture, Anaerobic [[id number]] Collected: 12/13/23 1703    Order Status: Completed Specimen: Abscess from Buttocks, Left Updated: 12/18/23 1026     Anaerobic Culture Culture in progress    Aerobic culture [[id number]] Collected: 12/15/23 1655    Order Status: Completed Specimen: Wound from Back Updated: 12/18/23 0915     Aerobic Bacterial Culture No growth    Narrative:      2.) 2,3 Disc space    Aerobic culture [[id number]] Collected: 12/15/23 1603    Order Status: Completed Specimen: Wound from Back Updated: 12/18/23 0915     Aerobic Bacterial Culture No growth    Narrative:      Paraspinal    Aerobic culture [[id number]] Collected: 12/15/23 1657    Order Status: Completed Specimen: Wound from Back Updated: 12/18/23 0913     Aerobic Bacterial Culture No growth    Narrative:      3.) 2,3 Disc space    Blood culture [[id number]] Collected: 12/12/23 0033    Order Status: Completed Specimen:  Blood Updated: 12/17/23 0612     Blood Culture, Routine No growth after 5 days.    Blood culture [[id number]] Collected: 12/12/23 0033    Order Status: Completed Specimen: Blood Updated: 12/17/23 0612     Blood Culture, Routine No growth after 5 days.    Aerobic culture [[id number]] Collected: 12/13/23 1703    Order Status: Completed Specimen: Abscess from Buttocks, Left Updated: 12/16/23 1109     Aerobic Bacterial Culture No growth    Blood culture x two cultures. Draw prior to antibiotics. [[id number]] Collected: 12/10/23 2007    Order Status: Completed Specimen: Blood from Peripheral, Antecubital, Left Updated: 12/15/23 2212     Blood Culture, Routine No growth after 5 days.    Narrative:      Aerobic and anaerobic    Blood culture x two cultures. Draw prior to antibiotics. [[id number]] Collected: 12/10/23 2007    Order Status: Completed Specimen: Blood from Peripheral, Hand, Left Updated: 12/15/23 2212     Blood Culture, Routine No growth after 5 days.    Narrative:      Aerobic and anaerobic    Gram stain [[id number]] Collected: 12/15/23 1657    Order Status: Completed Specimen: Wound from Back Updated: 12/15/23 1900     Gram Stain Result No WBC's      No organisms seen    Narrative:      3.) 2,3 Disc space    Gram stain [[id number]] Collected: 12/15/23 1655    Order Status: Completed Specimen: Wound from Back Updated: 12/15/23 1857     Gram Stain Result No WBC's      No organisms seen    Narrative:      2.) 2,3 Disc space    Gram stain [[id number]] Collected: 12/15/23 1603    Order Status: Completed Specimen: Wound from Back Updated: 12/15/23 1855     Gram Stain Result No WBC's      No organisms seen    Narrative:      Paraspinal    Fungus culture [[id number]] Collected: 12/15/23 1655    Order Status: Sent Specimen: Wound from Back Updated: 12/15/23 1730    Fungus culture [[id number]] Collected: 12/15/23 1657    Order Status: Sent Specimen: Wound from Back Updated: 12/15/23 1727    Fungus culture [[id number]]  Collected: 12/15/23 1603    Order Status: Sent Specimen: Wound from Back Updated: 12/15/23 1621    Culture, Anaerobe [[id number]]     Order Status: No result Specimen: Bone from Back     Aerobic culture [[id number]]     Order Status: No result Specimen: Bone from Back     AFB Culture & Smear [[id number]]     Order Status: No result Specimen: Bone from Back     Gram stain [[id number]]     Order Status: No result Specimen: Bone from Back     Fungus culture [[id number]]     Order Status: No result Specimen: Bone from Back     AFB Culture & Smear [[id number]] Collected: 12/13/23 1703    Order Status: Completed Specimen: Abscess from Buttocks, Left Updated: 12/14/23 2128     AFB Culture & Smear Culture in progress     AFB CULTURE STAIN No acid fast bacilli seen.    Gram stain [[id number]] Collected: 12/13/23 1703    Order Status: Completed Specimen: Abscess from Buttocks, Left Updated: 12/13/23 2104     Gram Stain Result No WBC's      No organisms seen    Fungus culture [[id number]] Collected: 12/13/23 1703    Order Status: Sent Specimen: Abscess from Buttocks, Left Updated: 12/13/23 1928          Pathology Results  (Last 10 years)                 07/28/23 1133  Specimen to Pathology, Surgery Gastrointestinal tract Final result    Narrative:  Pre-op Diagnosis: Diarrhea, unspecified type [R19.7]   Abnormal finding on GI tract imaging [R93.3]   Procedure(s):   EGD (ESOPHAGOGASTRODUODENOSCOPY)   COLONOSCOPY   Jar #1: Transverse colon polyp x1   Which provider would you like to cc?->HELEN AWAD   Release to patient->Immediate   Specimen total (fresh, frozen, permanent):->1               Significant Imaging: I have reviewed all pertinent imaging results/findings within the past 24 hours.

## 2023-12-19 NOTE — CONSULTS
D/L PICC placed in left brachial vein, 46 cm in length with 1 cm exposed. Arm circumference 34 cm. Lot#[id number]

## 2023-12-19 NOTE — PROGRESS NOTES
[facility]  Infectious Disease  Progress Note    Patient Name: Bj Pate Jr.  MRN: [id number]  Admission Date: 12/10/2023  Length of Stay: 8 days  Attending Physician: Monica Jefferson MD  Primary Care Provider: Jose Antonio Foster MD    Isolation Status: No active isolations  Assessment/Plan:      ID  * Acute osteomyelitis of lumbar spine  74 yo male with PMH of CVA, CKD3, HTN, DM2, COPD admitted with increasing lethargy s/p fall found to have L psoas abscess/lumbar OM s/p IR aspiration of L2/3 disc space (cx ngtd) and washout with lumbar fusion/TLIF/laminectomy with NSGY on 12/15 - post-op notable for lethargy with transfer to ICU requiring intubation and pressors. Op note with purulent material, cx and path in process, so far no growth to date. Post-op MRI revealed stable lumbar spondylodiscitis and L psoas abscess, possible cervical spondylodiscitis and bibasilar consolidations.       Recommendations:  - continue empiric vancomycin. pharm to dose and meropenem pending cx data. Anticipate 6-8 wks Iv abx.  - follow up cx/path         GI  Psoas muscle abscess  See AP above      A/p discussed with icu team.   Anticipated Disposition: tbd    Thank you for your consult. I will follow-up with patient. Please contact us if you have any additional questions.    Rowan Lau MD  Infectious Disease  [facility]    Subjective:     Principal Problem:Acute osteomyelitis of lumbar spine    HPI: Mr. Pate is a 74 yo male with PMH of CVA, CKD3, HTN, DM2, COPD who presents with increasing lethargy and confusion.     Pt remains altered during assessment, history gathered per family at bedside and chart review. Per chart review, pt reported ~3 weeks of low back pain, as well as a fall leading up to presentation. Following the fall, pt reported lower extremity weakness and urinary retention. Daughter at bedside states that pt has not had any surgical procedures or injections  recently, but was treated with pain management in 2019 and had lumbar and cervical injections. Pt is retired, but was a  previously. Family denied him having pets, denied adventurous hobbies, denied raw food intake. Daughter reported recent ER admission for abdominal pain. States pt required medication for a parasite that improved his symptoms. Upon chart review, appears pt presented in August with abdominal pain, CT AP was negative for acute findings. Pt was discharged and followed up with PCP shortly after and stated his symptoms had improved.     On admission, pt was febrile to 102, and tachycardic, with increasing leukocytosis, 13->16k, stable anemia, elevated sed/CRP: >120, 375, procal 1.37. blood cultures negative on admission. MRI lumbar spine noting discitis/osteomyelitis at L2-3 with left psoas abscess (2.2cm), as well as early osteo L3-4, noting difficult study and epidural abscess was difficult to exclude. CT head without evidence of superimposed acute intracranial process. MRI brain, cervical spine, and thoracic spine pending.     NSGY following and recommended IR consult for potential psoas abscess drainage and culture. Planning L2-pelvis decompression and fusion on 12/15/23.  IR planning CT guided aspiration/drainage of psoas abscess on either 12/12 or 12/13.     Pt is currently on vanc and cefepime. ID was consulted for antibiotic recs.       Interval History:     No acute events overnight. More somnolent today. Family at bedside.     Review of Systems   Constitutional:  Negative for fever.   Respiratory:  Negative for shortness of breath.    Gastrointestinal:  Negative for abdominal distention and abdominal pain.   Genitourinary:  Negative for dysuria.   Skin:  Positive for wound.   All other systems reviewed and are negative.    Objective:     Vital Signs (Most Recent):  Temp: 98.6 °F (37 °C) (12/19/23 1505)  Pulse: 103 (12/19/23 1505)  Resp: 19 (12/19/23 1505)  BP: 123/65 (12/19/23  1505)  SpO2: 98 % (12/19/23 1505) Vital Signs (24h Range):  Temp:  [98 °F (36.7 °C)-99.4 °F (37.4 °C)] 98.6 °F (37 °C)  Pulse:  [103-115] 103  Resp:  [15-24] 19  SpO2:  [96 %-99 %] 98 %  BP: ()/(53-81) 123/65     Weight: 119.7 kg (264 lb)  Body mass index is 38.99 kg/m².    Estimated Creatinine Clearance: 96.2 mL/min (based on SCr of 0.9 mg/dL).     Physical Exam  Constitutional:       General: He is not in acute distress.  HENT:      Head:      Comments: NG tube in place  Pulmonary:      Effort: Pulmonary effort is normal. No respiratory distress.   Abdominal:      General: There is no distension.      Palpations: Abdomen is soft.      Tenderness: There is no abdominal tenderness.   Genitourinary:     Comments: bryant  Musculoskeletal:      Right lower leg: No edema.      Left lower leg: No edema.      Comments: RUE edematous   Skin:     General: Skin is warm and dry.          Significant Labs:   Microbiology Results (last 7 days)       Procedure Component Value Units Date/Time    Culture, Anaerobe [[id number]] Collected: 12/15/23 1657    Order Status: Completed Specimen: Wound from Back Updated: 12/19/23 0852     Anaerobic Culture Culture in progress    Narrative:      3.) 2,3 Disc space    Culture, Anaerobe [[id number]] Collected: 12/15/23 1655    Order Status: Completed Specimen: Wound from Back Updated: 12/19/23 0851     Anaerobic Culture Culture in progress    Narrative:      2.) 2,3 Disc space    Culture, Anaerobe [[id number]] Collected: 12/15/23 1603    Order Status: Completed Specimen: Wound from Back Updated: 12/19/23 0850     Anaerobic Culture Culture in progress    Narrative:      Paraspinal    AFB Culture & Smear [[id number]] Collected: 12/15/23 1655    Order Status: Completed Specimen: Wound from Back Updated: 12/18/23 1436     AFB Culture & Smear Culture in progress     AFB CULTURE STAIN No acid fast bacilli seen.    Narrative:      2.) 2,3 Disc space    AFB Culture & Smear [[id number]]  Collected: 12/15/23 1657    Order Status: Completed Specimen: Wound from Back Updated: 12/18/23 1436     AFB Culture & Smear Culture in progress     AFB CULTURE STAIN No acid fast bacilli seen.    Narrative:      3.) 2,3 Disc space    AFB Culture & Smear [[id number]] Collected: 12/15/23 1603    Order Status: Completed Specimen: Wound from Back Updated: 12/18/23 1436     AFB Culture & Smear Culture in progress     AFB CULTURE STAIN No acid fast bacilli seen.    Narrative:      Paraspinal    Culture, Anaerobic [[id number]] Collected: 12/13/23 1703    Order Status: Completed Specimen: Abscess from Buttocks, Left Updated: 12/18/23 1026     Anaerobic Culture Culture in progress    Aerobic culture [[id number]] Collected: 12/15/23 1655    Order Status: Completed Specimen: Wound from Back Updated: 12/18/23 0915     Aerobic Bacterial Culture No growth    Narrative:      2.) 2,3 Disc space    Aerobic culture [[id number]] Collected: 12/15/23 1603    Order Status: Completed Specimen: Wound from Back Updated: 12/18/23 0915     Aerobic Bacterial Culture No growth    Narrative:      Paraspinal    Aerobic culture [[id number]] Collected: 12/15/23 1657    Order Status: Completed Specimen: Wound from Back Updated: 12/18/23 0913     Aerobic Bacterial Culture No growth    Narrative:      3.) 2,3 Disc space    Blood culture [[id number]] Collected: 12/12/23 0033    Order Status: Completed Specimen: Blood Updated: 12/17/23 0612     Blood Culture, Routine No growth after 5 days.    Blood culture [[id number]] Collected: 12/12/23 0033    Order Status: Completed Specimen: Blood Updated: 12/17/23 0612     Blood Culture, Routine No growth after 5 days.    Aerobic culture [[id number]] Collected: 12/13/23 1703    Order Status: Completed Specimen: Abscess from Buttocks, Left Updated: 12/16/23 1109     Aerobic Bacterial Culture No growth    Blood culture x two cultures. Draw prior to antibiotics. [[id number]] Collected: 12/10/23 2007    Order  Status: Completed Specimen: Blood from Peripheral, Antecubital, Left Updated: 12/15/23 2212     Blood Culture, Routine No growth after 5 days.    Narrative:      Aerobic and anaerobic    Blood culture x two cultures. Draw prior to antibiotics. [[id number]] Collected: 12/10/23 2007    Order Status: Completed Specimen: Blood from Peripheral, Hand, Left Updated: 12/15/23 2212     Blood Culture, Routine No growth after 5 days.    Narrative:      Aerobic and anaerobic    Gram stain [[id number]] Collected: 12/15/23 1657    Order Status: Completed Specimen: Wound from Back Updated: 12/15/23 1900     Gram Stain Result No WBC's      No organisms seen    Narrative:      3.) 2,3 Disc space    Gram stain [[id number]] Collected: 12/15/23 1655    Order Status: Completed Specimen: Wound from Back Updated: 12/15/23 1857     Gram Stain Result No WBC's      No organisms seen    Narrative:      2.) 2,3 Disc space    Gram stain [[id number]] Collected: 12/15/23 1603    Order Status: Completed Specimen: Wound from Back Updated: 12/15/23 1855     Gram Stain Result No WBC's      No organisms seen    Narrative:      Paraspinal    Fungus culture [[id number]] Collected: 12/15/23 1655    Order Status: Sent Specimen: Wound from Back Updated: 12/15/23 1730    Fungus culture [[id number]] Collected: 12/15/23 1657    Order Status: Sent Specimen: Wound from Back Updated: 12/15/23 1727    Fungus culture [[id number]] Collected: 12/15/23 1603    Order Status: Sent Specimen: Wound from Back Updated: 12/15/23 1621    Culture, Anaerobe [[id number]]     Order Status: No result Specimen: Bone from Back     Aerobic culture [[id number]]     Order Status: No result Specimen: Bone from Back     AFB Culture & Smear [[id number]]     Order Status: No result Specimen: Bone from Back     Gram stain [[id number]]     Order Status: No result Specimen: Bone from Back     Fungus culture [[id number]]     Order Status: No result Specimen: Bone from Back     AFB Culture &  Smear [[id number]] Collected: 12/13/23 1703    Order Status: Completed Specimen: Abscess from Buttocks, Left Updated: 12/14/23 2128     AFB Culture & Smear Culture in progress     AFB CULTURE STAIN No acid fast bacilli seen.    Gram stain [[id number]] Collected: 12/13/23 1703    Order Status: Completed Specimen: Abscess from Buttocks, Left Updated: 12/13/23 2104     Gram Stain Result No WBC's      No organisms seen    Fungus culture [[id number]] Collected: 12/13/23 1703    Order Status: Sent Specimen: Abscess from Buttocks, Left Updated: 12/13/23 1928          Pathology Results  (Last 10 years)                 07/28/23 1133  Specimen to Pathology, Surgery Gastrointestinal tract Final result    Narrative:  Pre-op Diagnosis: Diarrhea, unspecified type [R19.7]   Abnormal finding on GI tract imaging [R93.3]   Procedure(s):   EGD (ESOPHAGOGASTRODUODENOSCOPY)   COLONOSCOPY   Jar #1: Transverse colon polyp x1   Which provider would you like to cc?->HELEN AWAD   Release to patient->Immediate   Specimen total (fresh, frozen, permanent):->1               Significant Imaging: I have reviewed all pertinent imaging results/findings within the past 24 hours.    Critical care time spent on the evaluation and treatment of severe organ dysfunction, review of pertinent labs and imaging studies, discussions with consulting providers and discussions with patient/family: 30 minutes.

## 2023-12-19 NOTE — TREATMENT PLAN
Hospital Medicine ICU Acceptance Note    Date of Admit: 12/10/2023  Date of Transfer / Stepdown: 12/18/2023  Accepting  team: N    Brief History of Present Illness:      Bj Pate Jr. is a 74 yo presented with confusion and low back pain. Found to have sepsis due to epidural and psoas abscesses  Hospital/ICU Course:   Was admitted to [facility] for post operative monitoring following a L1-L4 segmental posterolateral fusion, L2-L4 laminectomy and L2-L3 TLIF for L2-L4 discitis/osteomyelitis on 12/15: Went for CTA because of lethargy since surgery. Did not tolerate procedure, returned to [facility]. Sedated and intubated for MRI pan spine, as patient had post surgical changes in exam. Continuing Vanc and Deo. MRI with stable findings except for typical post-operative findings. No further surgical plans per NSGY. Provigil given to improve wakefulness, will start daily. Extubated 11/17/2023. 12/19 PICC consulted placed for long-term IV antibiotic administration. Lasix 40mg IV given for BUE swelling. US extremities ordered to rule out DVT. Showed   superficial thrombophlebitis of the right cephalic vein.     Transfer Information:     * Acute osteomyelitis of lumbar spine  Psoas abscess  Was admitted to [facility] for post operative monitoring following a L1-L4 segmental posterolateral fusion, L2-L4 laminectomy and L2-L3 TLIF for L2-L4 discitis/osteomyelitis on 12/15.   S/p IR biopsy of psoas abscess and L2-L3 disc space.     -Continue  Meropenem and Vancomycin per ID recs    Psoas muscle abscess  See severe sepsis    Septic encephalopathy  See acute osteomyelitis of lumbar spine    Discitis  See severe sepsis    Severe sepsis  Patient admitted with tachycardia, tachypnea, fever and leukocytosis  Treatment as for acute osteomyelitis of lumbar spine    Acute metabolic encephalopathy  Septic encephalopathy  ICU delirium  Pain  polypharmacy    Neurology consulted, low suspicion for seizures, possibly gout flare per their assessment but  this seems unlikely given his presentation and MRI/CT findings  Placed on provigil due to somnolence    Hemiparesis  History of R sided hemiparesis from prior CVA. Mostly resolved per family.  PT/OT     Stercoral colitis  CT AP w rectal colitis vs constipation    - brown bomb on 12/14 with large bowel movement  - Aggressive Bowel regimen after surgery    Back pain  Likely secondary to L2-L3 diskitis/ostemyelitis.    MRI spine completed  NSGY consulted. Surgery today for L1-L4 fusion and decompression  Pain meds PRN    Malnutrition  Maintenance IVFs  NG tube placement ordered  RDN consulted, appreciate recs  Tube feeds held for surgery today    Acute on chronic systolic heart failure  Echo 12/10/2023    Interpretation Summary    Technically difficult portable study    Left Ventricle: The left ventricle is normal in size. There is concentric remodeling. Mild global hypokinesis present. There is mildly reduced systolic function with a visually estimated ejection fraction of 45 - 50%.    Right Ventricle: Normal right ventricular cavity size. Wall thickness is normal. Right ventricle wall motion  is normal. Systolic function is normal.    Pulmonary Artery: The estimated pulmonary artery systolic pressure is at least 24 mmHg.    The IVC was not well visualized.    Urinary retention  Rivera placed by ED, PVR now that he has external condom catheter. Due to diskitis/osteomyelitis, concern for possible spinal cord compression.    Bladder scan as needed  Document PVRs    History of CVA (cerebrovascular accident)  -R thalamic, 2016  No acute processes on head CT or brain MRI    Class 2 obesity with body mass index (BMI) of 39.0 to 39.9 in adult  Body mass index is 38.99 kg/m². Morbid obesity complicates all aspects of disease management from diagnostic modalities to treatment. Weight loss encouraged and health benefits explained to patient.        Patient has been accepted by Hospital Medicine Team N, who will assume care of  the patient upon arrival to the floor from the ICU. Please contact ICU team with any concerns prior to arrival. Please contact [facility] Medicine at [phone] or [phone] (please do NOT leave a voicemail) when patient arrives to the floor.

## 2023-12-19 NOTE — PHYSICIAN QUERY
PT Name: Bj Pate Jr.  MR #: [id number]     DOCUMENTATION CLARIFICATION     CDS/: Beth Pryor RN          Contact Information: [email or web address]   This form is a permanent document in the medical record.     Query Date: 2023    By submitting this query, we are merely seeking further clarification of documentation.  Please utilize your independent clinical judgment when addressing the question(s) below.  Provider, please provide the integumentary diagnosis related to the documentation of Gluteal Cleft:   The Medical Record contains the followin/13 Wound Care Consult  Pt w/ defined area of partial thickness skin loss noted to the center of his gluteal cleft. Cleansed gently w/ soap and water and applied Triad for moisture barrier protection. Bariatric immerse ordered for prevention and easier turning while in bed.   RECOMMENDATIONS:  BID/PRN - gluteal cleft - cleanse gently w/ soap and water or no rinse baby wipes, do not scrub - residual Triad is OK. Pat dry and apply Triad to affected area and leave [id number]. NO FOAM DRESSINGS w/ Triad use.    Gluteal cleft Moisture associated dermatitis     Description of Altered Skin Integrity: Partial thickness tissue loss. Shallow open ulcer with a red or pink wound bed, without slough. Intact or Open/Ruptured Serum-filled blister.            The clinical guidelines noted are only a system guideline. It does not replace the providers clinical judgment.    Per the National Pressure Injury Advisory Panel:   A pressure injury is localized damage to the skin and underlying soft tissue usually over a bony prominence or related to a medical or other device. The injury can present as intact skin or an open ulcer and may be painful. The injury occurs as a result of intense and/or prolonged pressure or pressure in combination with shear. The tolerance of soft tissue for pressure and shear may also be affected by microclimate, nutrition, perfusion,  co-morbidities and condition of the soft tissue.       Stage 1 Pressure Injury:  Intact skin with a localized area of non-blanchable erythema, which may appear differently in darkly pigmented skin. Color changes do not include purple or maroon discoloration; these may indicate deep tissue pressure injury.    Stage 2 Pressure Injury:  Partial-thickness loss of skin with exposed dermis. The wound bed is viable, pink or red, moist, and may also present as an intact or ruptured serum-filled blister.    Stage 3 Pressure Injury:  Full-thickness loss of skin, in which adipose (fat) is visible in the ulcer and granulation tissue and epibole (rolled wound edges) are often present. Slough and/or eschar may be visible. Undermining and tunneling may occur.    Stage 4 Pressure Injury:  Full-thickness skin and tissue loss with exposed or directly palpable fascia, muscle, tendon, ligament, cartilage or bone in the ulcer. Slough and/or eschar may be visible. Epibole (rolled edges), undermining and/or tunneling often occur.    Unstageable Pressure Injury:  Full-thickness skin and tissue loss in which the extent of tissue damage within the ulcer cannot be confirmed because it is obscured by slough or eschar. If slough or eschar is removed, a Stage 3 or Stage 4 pressure injury will be revealed.        Provider, please provide the integumentary diagnosis related to the documentation of Gluteal Cleft:  Please select all that apply.      [    ] Pressure Injury/Decubitus Ulcer, Stage 2   [  x  ] Moisture associated dermatitis due to Fecal, Urinary, or Dual Incontinence   [    ] Moisture associated dermatitis due to Friction or Contact with body fluids, unspecified   [    ] Non-pressure ulcer, skin breakdown only   [    ] Non-pressure ulcer, other depth (please specify): ___________   [    ] Other Integumentary Diagnosis (please specify):______________       Please document in your progress notes daily for the duration of treatment until  resolved and include in your discharge summary.    Reference:    ILAN Spaulding., STEPHEN Rivera., Goldberg, M., ILAN Hernandez, ILAN Poon, & ALY Bruce. (2016). Revised National Pressure Ulcer Advisory Panel Pressure Injury Staging System: Revised Pressure Injury Staging System. J Wound Ostomy Continence Nurs, 43(6), 585-597. doi:10.1097/won.8709877947696343    Form No.19714

## 2023-12-19 NOTE — PLAN OF CARE
[facility] Care Plan    POC reviewed with Bj Pate ., son, and deedeeer at 1500. Pt and children verbalized understanding. Questions and concerns addressed. No acute events today. Pt worked with all rehab services today. PICC line currently being placed for long term antibiotics. Pt bathed today. CPAP placed for nap mid day. Pt progressing toward goals. Will continue to monitor. See below and flowsheets for full assessment and VS info.             Is this a stroke patient? no    Neuro:  [place] Coma Scale  Best Eye Response: 4-->(E4) spontaneous  Best Motor Response: 6-->(M6) obeys commands  Best Verbal Response: 4-->(V4) confused  [place] Coma Scale Score: 14  Assessment Qualifiers: patient not sedated/intubated, no eye obstruction present  Pupil PERRLA: yes     24 hr Temp:  [98 °F (36.7 °C)-99.4 °F (37.4 °C)]     CV:   Rhythm: sinus tachycardia  BP goals:   SBP < 160  MAP > 65    Resp:      Vent Mode: Spont  Set Rate: 18 BPM  Oxygen Concentration (%): 40  Vt Set: 480 mL  PEEP/CPAP: 5 cmH20  Pressure Support: 5 cmH20    Plan: N/A    GI/:     Diet/Nutrition Received: tube feeding  Last Bowel Movement: 12/18/23  Voiding Characteristics: external catheter    Intake/Output Summary (Last 24 hours) at 12/19/2023 1602  Last data filed at 12/19/2023 1505  Gross per 24 hour   Intake 1677.08 ml   Output 1580 ml   Net 97.08 ml     Unmeasured Output  Urine Occurrence: 1  Stool Occurrence: 1    Labs/Accuchecks:  Recent Labs   Lab 12/19/23  0145   WBC 18.47*   RBC 2.62*   HGB 7.7*   HCT 23.6*         Recent Labs   Lab 12/19/23  0145      K 3.8   CO2 25      BUN 33*   CREATININE 0.9   ALKPHOS 103   ALT 50*   AST 89*   BILITOT 0.7      Recent Labs   Lab 12/14/23  1809   INR 1.0   APTT 25.9      Recent Labs   Lab 12/12/23  2333   TROPONINI 0.063*       Electrolytes: N/A - electrolytes WDL  Accuchecks: Q6H    Gtts:      LDA/Wounds:  Lines/Drains/Airways       Drain  Duration                  NG/OG Tube 12/14/23  0800 nasogastric Left nostril 5 days         Closed/Suction Drain 12/15/23 1721 Tube - 1 Right Back Accordion 10 Fr. 3 days         Closed/Suction Drain 12/15/23 1722 Tube - 2 Left Back Accordion 10 Fr. 3 days    Male External Urinary Catheter 12/17/23 1128 Small 2 days              Peripheral Intravenous Line  Duration                  Peripheral IV - Single Lumen 12/16/23 1731 20 G Anterior;Right Foot 2 days         Peripheral IV - Single Lumen 12/16/23 1733 18 G Anterior;Distal;Right Antecubital 2 days                  Wounds: Yes  Wound care consulted: Yes

## 2023-12-19 NOTE — PROGRESS NOTES
[facility] - [facility]  Neurosurgery  Progress Note    Subjective:     History of Present Illness: Mr. Pate is a 73M w/ hx CVA, CKD3, HTN, DM2, COPD who presents with increasing lethargy and confusion. Per ED team, patient had 3 weeks of low back pain, with a fall yesterday leading to his presentation. He had subjective lower extremity weakness and urinary retention after the fall. Septic on presentation to ED. Too altered to meaningfully participate in spine motor exam at time of NSGY evaluation.     Post-Op Info:  Procedure(s) (LRB):  L-1 TO L-4 FUSION, SPINE, LUMBAR, TLIF, POSTERIOR APPROACH, USING PEDICLE SCREW (N/A)   4 Days Post-Op   Interval History:    No new issues per nursing. Exam stable. HV in place with 5 and 50 as Wound vac       Medications:  Continuous Infusions:    Scheduled Meds:   acetaminophen  1,000 mg Per NG tube Q8H    fentaNYL  50 mcg Intravenous Once    fluticasone furoate-vilanteroL  1 puff Inhalation Daily    gabapentin  200 mg Per NG tube TID    meropenem (MERREM) IVPB  2 g Intravenous Q8H    modafiniL  200 mg Per NG tube Before breakfast    mupirocin   Nasal BID    polyethylene glycol  17 g Per NG tube BID    senna-docusate 8.6-50 mg  1 tablet Per NG tube Daily    vancomycin (VANCOCIN) IV (PEDS and ADULTS)  1,750 mg Intravenous Q24H     PRN Meds:aluminum-magnesium hydroxide-simethicone, dextrose 10%, dextrose 10%, diphenhydrAMINE, glucagon (human recombinant), glucose, glucose, insulin aspart U-100, levalbuterol, magnesium oxide, magnesium oxide, morphine, naloxone, ondansetron, potassium bicarbonate, potassium bicarbonate, potassium bicarbonate, potassium, sodium phosphates, potassium, sodium phosphates, potassium, sodium phosphates, sodium chloride 0.9%, sodium chloride 0.9%, Pharmacy to dose Vancomycin consult **AND** vancomycin - pharmacy to dose     Review of Systems  Objective:     Weight: 119.7 kg (264 lb)  Body mass index is 38.99 kg/m².  Vital Signs (Most  Recent):  Temp: 97.8 °F (36.6 °C) (12/18/23 0730)  Pulse: (!) 111 (12/18/23 0905)  Resp: (!) 23 (12/18/23 0905)  BP: (!) 110/56 (12/18/23 0905)  SpO2: 98 % (12/18/23 0905) Vital Signs (24h Range):  Temp:  [97.8 °F (36.6 °C)-98.6 °F (37 °C)] 97.8 °F (36.6 °C)  Pulse:  [] 111  Resp:  [15-25] 23  SpO2:  [96 %-100 %] 98 %  BP: ()/(51-95) 110/56  Arterial Line BP: ()/() 88/82     Date 12/18/23 0700 - 12/19/23 0659   Shift 5636-5968 2651-2912 5486-4209 24 Hour Total   INTAKE   I.V.(mL/kg) 46.1(0.4)   46.1(0.4)   Shift Total(mL/kg) 46.1(0.4)   46.1(0.4)   OUTPUT   Urine(mL/kg/hr) 275   275   Shift Total(mL/kg) 275(2.3)   275(2.3)   Weight (kg) 119.7 119.7 119.7 119.7                Vent Mode: Spont  Oxygen Concentration (%):  [40] 40  Resp Rate Total:  [17 br/min-19 br/min] 17 br/min  Vt Set:  [480 mL] 480 mL  PEEP/CPAP:  [5 cmH20] 5 cmH20  Pressure Support:  [5 cmH20] 5 cmH20  Mean Airway Pressure:  [7.5 cmH20-9.3 cmH20] 7.5 cmH20             Closed/Suction Drain 12/15/23 1721 Tube - 1 Right Back Accordion 10 Fr. (Active)   Dressing Status Clean;Dry;Intact 12/16/23 1101   Dressing Intervention Integrity maintained 12/16/23 1101   Drainage Sanguineous;Bright red;Bloody 12/16/23 1101   Status Other (Comment) 12/16/23 1101   Output (mL) 160 mL 12/16/23 0528            Closed/Suction Drain 12/15/23 1722 Tube - 2 Left Back Accordion 10 Fr. (Active)   Dressing Status Clean;Dry;Intact 12/16/23 1101   Dressing Intervention Integrity maintained 12/16/23 1101   Drainage Sanguineous;Bright red;Bloody 12/16/23 1101   Status Other (Comment) 12/16/23 1101   Output (mL) 70 mL 12/16/23 0528            NG/OG Tube 12/14/23 0800 nasogastric Left nostril (Active)   Placement Check placement verified by x-ray 12/16/23 1101   Tolerance no signs/symptoms of discomfort 12/16/23 1101   Securement secured to nostril center w/ adhesive device 12/16/23 1101   Clamp Status/Tolerance clamped 12/16/23 1101   Suction  "Setting/Drainage Method suction at the bedside 12/16/23 1101   Insertion Site Appearance no redness, warmth, tenderness, skin breakdown, drainage 12/16/23 1101   Drainage None 12/16/23 1101   Flush/Irrigation flushed w/;water;no resistance met 12/16/23 1101   Feeding Type continuous 12/14/23 1226   Feeding Action feeding held 12/16/23 1101   Current Rate (mL/hr) 10 mL/hr 12/14/23 1226   Intake (mL) 50 mL 12/15/23 0006   Water Bolus (mL) 250 mL 12/14/23 2026   Tube Output(mL)(Include Discarded Residual) 0 mL 12/15/23 0006   Intake (mL) - Formula Tube Feeding 15 12/14/23 2026            Urethral Catheter 12/15/23 1636 Straight-tip;Silicone 16 Fr. (Active)   Site Assessment Clean;Intact 12/16/23 1101   Collection Container Standard drainage bag 12/16/23 1101   Securement Method secured to top of thigh w/ adhesive device 12/16/23 1101   Catheter Care Performed yes 12/16/23 1101   Reason for Continuing Urinary Catheterization Post operative 12/16/23 1101   CAUTI Prevention Bundle Securement Device in place with 1" slack;Intact seal between catheter & drainage tubing;Drainage bag/urimeter off the floor;Sheeting clip in use;No dependent loops or kinks;Drainage bag/urimeter not overfilled (<2/3 full);Drainage bag/urimeter below bladder 12/16/23 1101   Output (mL) 125 mL 12/16/23 1101          Physical Exam       [id number]  OX1  Following commands   BUE 2/5 prxomal and 3/5 distal, L >R  BLE 2/5 prxomal and 3/5 distal, L >R  HV and WVAC in place     Significant Labs:  Recent Labs   Lab 12/17/23  0335 12/18/23  0113   * 108   * 136   K 4.2 4.2    104   CO2 24 25   BUN 43* 38*   CREATININE 1.1 0.9   CALCIUM 9.1 8.8   MG 2.0 1.9       Recent Labs   Lab 12/17/23  0335 12/17/23  0415 12/18/23  0113   WBC 20.15*  --  16.97*   HGB 8.2*  --  7.9*   HCT 24.2* 25* 24.2*     --  333       No results for input(s): "LABPT", "INR", "APTT" in the last 48 hours.    Microbiology Results (last 7 days)       Procedure " Component Value Units Date/Time    Aerobic culture [[id number]] Collected: 12/15/23 1655    Order Status: Completed Specimen: Wound from Back Updated: 12/18/23 0915     Aerobic Bacterial Culture No growth    Narrative:      2.) 2,3 Disc space    Aerobic culture [[id number]] Collected: 12/15/23 1603    Order Status: Completed Specimen: Wound from Back Updated: 12/18/23 0915     Aerobic Bacterial Culture No growth    Narrative:      Paraspinal    Aerobic culture [[id number]] Collected: 12/15/23 1657    Order Status: Completed Specimen: Wound from Back Updated: 12/18/23 0913     Aerobic Bacterial Culture No growth    Narrative:      3.) 2,3 Disc space    Blood culture [[id number]] Collected: 12/12/23 0033    Order Status: Completed Specimen: Blood Updated: 12/17/23 0612     Blood Culture, Routine No growth after 5 days.    Blood culture [[id number]] Collected: 12/12/23 0033    Order Status: Completed Specimen: Blood Updated: 12/17/23 0612     Blood Culture, Routine No growth after 5 days.    AFB Culture & Smear [[id number]] Collected: 12/15/23 1603    Order Status: Completed Specimen: Wound from Back Updated: 12/16/23 2127     AFB Culture & Smear Culture in progress    Narrative:      Paraspinal    AFB Culture & Smear [[id number]] Collected: 12/15/23 1655    Order Status: Completed Specimen: Wound from Back Updated: 12/16/23 2127     AFB Culture & Smear Culture in progress    Narrative:      2.) 2,3 Disc space    AFB Culture & Smear [[id number]] Collected: 12/15/23 1657    Order Status: Completed Specimen: Wound from Back Updated: 12/16/23 2127     AFB Culture & Smear Culture in progress    Narrative:      3.) 2,3 Disc space    Aerobic culture [[id number]] Collected: 12/13/23 1703    Order Status: Completed Specimen: Abscess from Buttocks, Left Updated: 12/16/23 1109     Aerobic Bacterial Culture No growth    Culture, Anaerobe [[id number]] Collected: 12/15/23 1655    Order Status: Completed Specimen: Wound from Back  Updated: 12/16/23 0710     Anaerobic Culture Culture in progress    Narrative:      2.) 2,3 Disc space    Culture, Anaerobe [[id number]] Collected: 12/15/23 1657    Order Status: Completed Specimen: Wound from Back Updated: 12/16/23 0710     Anaerobic Culture Culture in progress    Narrative:      3.) 2,3 Disc space    Culture, Anaerobe [[id number]] Collected: 12/15/23 1603    Order Status: Completed Specimen: Wound from Back Updated: 12/16/23 0710     Anaerobic Culture Culture in progress    Narrative:      Paraspinal    Blood culture x two cultures. Draw prior to antibiotics. [[id number]] Collected: 12/10/23 2007    Order Status: Completed Specimen: Blood from Peripheral, Antecubital, Left Updated: 12/15/23 2212     Blood Culture, Routine No growth after 5 days.    Narrative:      Aerobic and anaerobic    Blood culture x two cultures. Draw prior to antibiotics. [[id number]] Collected: 12/10/23 2007    Order Status: Completed Specimen: Blood from Peripheral, Hand, Left Updated: 12/15/23 2212     Blood Culture, Routine No growth after 5 days.    Narrative:      Aerobic and anaerobic    Gram stain [[id number]] Collected: 12/15/23 1657    Order Status: Completed Specimen: Wound from Back Updated: 12/15/23 1900     Gram Stain Result No WBC's      No organisms seen    Narrative:      3.) 2,3 Disc space    Gram stain [[id number]] Collected: 12/15/23 1655    Order Status: Completed Specimen: Wound from Back Updated: 12/15/23 1857     Gram Stain Result No WBC's      No organisms seen    Narrative:      2.) 2,3 Disc space    Gram stain [[id number]] Collected: 12/15/23 1603    Order Status: Completed Specimen: Wound from Back Updated: 12/15/23 1855     Gram Stain Result No WBC's      No organisms seen    Narrative:      Paraspinal    Fungus culture [[id number]] Collected: 12/15/23 1655    Order Status: Sent Specimen: Wound from Back Updated: 12/15/23 1730    Fungus culture [[id number]] Collected: 12/15/23 1657    Order  Status: Sent Specimen: Wound from Back Updated: 12/15/23 1727    Fungus culture [[id number]] Collected: 12/15/23 1603    Order Status: Sent Specimen: Wound from Back Updated: 12/15/23 1621    Culture, Anaerobe [[id number]]     Order Status: No result Specimen: Bone from Back     Aerobic culture [[id number]]     Order Status: No result Specimen: Bone from Back     AFB Culture & Smear [[id number]]     Order Status: No result Specimen: Bone from Back     Gram stain [[id number]]     Order Status: No result Specimen: Bone from Back     Fungus culture [[id number]]     Order Status: No result Specimen: Bone from Back     Culture, Anaerobic [[id number]] Collected: 12/13/23 1703    Order Status: Completed Specimen: Abscess from Buttocks, Left Updated: 12/15/23 0707     Anaerobic Culture Culture in progress    AFB Culture & Smear [[id number]] Collected: 12/13/23 1703    Order Status: Completed Specimen: Abscess from Buttocks, Left Updated: 12/14/23 2128     AFB Culture & Smear Culture in progress     AFB CULTURE STAIN No acid fast bacilli seen.    Gram stain [[id number]] Collected: 12/13/23 1703    Order Status: Completed Specimen: Abscess from Buttocks, Left Updated: 12/13/23 2104     Gram Stain Result No WBC's      No organisms seen    Fungus culture [[id number]] Collected: 12/13/23 1703    Order Status: Sent Specimen: Abscess from Buttocks, Left Updated: 12/13/23 1928          All pertinent labs from the last 24 hours have been reviewed.    Significant Diagnostics:  I have reviewed and interpreted all pertinent imaging results/findings within the past 24 hours.  X-Ray Chest AP Portable    Result Date: 12/16/2023  As above Electronically signed by: Laure Reynoso MD Date:    12/16/2023 Time:    12:43    CT Head Without Contrast    Result Date: 12/16/2023  Severe motion distorted examination as detailed above essentially nondiagnostic portions of the vertex and skull base. No evidence for acute intracranial hemorrhage  or sulcal effacement to suggest large territory recent infarction in the non motion distorted portion of the examination.  Clinical correlation and further evaluation with repeat attempts for imaging when patient better able to tolerate scanning advised. Electronically signed by: Maldonado Rodriguez,  Date:    12/16/2023 Time:    09:56 ]  Assessment/Plan:     * Acute osteomyelitis of lumbar spine  A 73M w/ hx CVA, CKD3, HTN, DM2, and COPD who presents with AMS likely 2/2 underling sepsis as well as progressive back pain and inability to ambulate for the past 2 weeks due to progressive L2-L3 osteodiscitis and presumed mechanical instability.          CTH 12/11: lacuanr infarcts   MRI w/wo L sp 12/11: severe central stenosis L4-5, discitis osteomyelitis at L2-3, L psoas abscess up to 2 cm  MRI C/T w/wo: very poor with alot of motion artifact, but no OM else where   US BLE 12/12: negative  12/10: , CRP >120  BCx NGTD  MRI Head, Csp, CTH 12/16: nondiagnostic  MRI C/T/L w/wo 12/16 without contribution to picture of fluctuating sensorium and motor exam; severe spondylosis in Csp with some STIR and contrast enhancement prevertebral and possibly early osteodiscitis. MRI Tsp without concerning findings and MRI Lsp with expected postop changes.      Now s/p L1-L4 decompression, L2-L3 TLIF and L1-L4 PSF on 12/15      POD#4    Subaxial cervical spondylosis which required PCF down the line  Encephalopathy is improving.  Okay to step-down back to hospital medicine from neurosurgical standpoint pending bed  Continue multimodal pain medication   Keep Hemovac drains and wound VAC in place. Wound vac coming down tomrw  LSO brace when OOB  Satting well  MAP >65  Advance diet as tolerated   Intraoperative cultures NGTD, continue abx per ID  DVT ppx   BUE DVT US for RUE swelling and pain   PT/OT and OOB   Rest of care per primary and NCC  Please notify neurosurgery on call for any acute neurologic change    Dispo: TTF to  NADIRA Traylor MD  Neurosurgery  [facility] - [facility]

## 2023-12-19 NOTE — ASSESSMENT & PLAN NOTE
Septic encephalopathy  ICU delirium  Pain  polypharmacy    Neurology consulted, low suspicion for seizures, possibly gout flare per their assessment but this seems unlikely given his presentation and MRI/CT findings  Placed on provigil due to somnolence

## 2023-12-19 NOTE — PT/OT/SLP PROGRESS
Speech Language Pathology Treatment    Patient Name:  Bj Pate Jr.   MRN:  [id number]  Admitting Diagnosis: Acute osteomyelitis of lumbar spine    Recommendations:                 General Recommendations:  Dysphagia therapy and Cognitive-linguistic evaluation  Diet recommendations:  NPO, Liquid Diet Level: NPO   Aspiration Precautions: Continue alternate means of nutrition, Frequent oral care, Ice chips sparingly, and Strict aspiration precautions   General Precautions: Standard, aspiration, fall  Communication strategies:  provide increased time to answer    Assessment:     Bj Pate Jr. is a 73 y.o. male with an SLP diagnosis of Dysphagia.  He presents with decreased JD and confusion this date.    Subjective     Spoke with RN prior to session. Pt resting upon entry to room, roused with verbal and tactile cues. NGT in place.     Pain/Comfort:  Pain Rating 1:  (none indicated)    Respiratory Status: Room air    Objective:     Has the patient been evaluated by SLP for swallowing?   Yes  Keep patient NPO? Yes     Pt seen this date for ongoing swallow assessment. Pt with overall decreased DJ and generalized weakness requiring intermittent verbal and tactile cues throughout session to maintain adequate JD.  HOB raised to max level ~ 75 degrees with head prompted froward with pillow. Performed gentle oral stimulation with damp swab soaked in mouth wash prior to placing top/bottom dentures. Son had brought dentures and adhesive, therefore washed and placed them prior to PO trials. Worked on intake of ice chips x 3, tsp thin liquids x 3, cup sip x 1, straw sip x 3 and tsp puree x 1. Pt demonstrating adequate oral acceptance and extraction from straw, cup and spoon. No anterior spillage appreciated. He demonstrated reduced and prolonged bolus formation/manipulation, prolonged AP transit, delayed and effortful swallow response and coughing following intake of thin liquids and strong/guttural, wet throat clear  following additional trials of thin and puree. Pt cued to cough, though unable to demonstrate strong, productive cough. Bedside suction used to clear oral cavity. Additional trials deferred 2/2 overt s/sx with minimal trials of thin/puree, generalized weakness, and decreased JD. Provided gentle oral care with damp swab soaked in mouth wash and oral suction and repositioned pt to be reclined and comfortable prior to SLP exit.  Recommend he continue with alternative means of nutrition, NPO except oral care and ice chips for pleasure when awake/alert and upright. Education provided re: role of SLP, diet recs, swallow precs, s/s aspiration and POC.  Pt verbalized understanding though requires ongoing reinforcement. RN and team updated post session.      Goals:   Multidisciplinary Problems       SLP Goals          Problem: SLP    Goal Priority Disciplines Outcome   SLP Goal     SLP Ongoing, Progressing   Description: Speech Language Pathology Goals  Goals expected to be met by 12/25/23    1. Pt will tolerate thin liquids w/o overt S/S aspiration, MIN A  2. Pt will participate in ongoing assessment of swallow function to determine safest, least restrictive means of nutrition/hydration  3. Pt will participate in further assessment of cognitive-linguistic skills to determine ongoing POC needs  4. Educate Pt and family on aspiration precautions and SLP POC                       Plan:     Patient to be seen:  4 x/week   Plan of Care expires:  01/17/24  Plan of Care reviewed with:  patient   SLP Follow-Up:  Yes       Discharge recommendations:   (tbd)   Barriers to Discharge:  Level of Skilled Assistance Needed      Time Tracking:     SLP Treatment Date:   12/19/23  Speech Start Time:  1008  Speech Stop Time:  1029     Speech Total Time (min):  21 min    Billable Minutes: Treatment Swallowing Dysfunction 13 and Self Care/Home Management Training 8    12/19/2023

## 2023-12-19 NOTE — PLAN OF CARE
Problem: Occupational Therapy  Goal: Occupational Therapy Goal  Description: Goals to be met by: 1/19/24     Patient will increase functional independence with ADLs by performing:    UE Dressing with Moderate Assistance.  LE Dressing with Maximum Assistance.  Grooming while seated with Moderate Assistance.  Toileting from bedside commode with Maximum Assistance for hygiene and clothing management.   Step transfer with Moderate Assistance  Toilet transfer to bedside commode with Moderate Assistance.    Outcome: Ongoing, Progressing

## 2023-12-19 NOTE — PROCEDURES
"Bj Pate Jr. is a 73 y.o. male patient.    Temp: 98.6 °F (37 °C) (12/19/23 1505)  Pulse: 103 (12/19/23 1505)  Resp: 19 (12/19/23 1505)  BP: 123/65 (12/19/23 1505)  SpO2: 98 % (12/19/23 1505)  Weight: 119.7 kg (264 lb) (12/15/23 2003)  Height: 5' 9" (175.3 cm) (12/15/23 2003)    PICC  Date/Time: 12/19/2023 3:58 PM  Location procedure was performed: [facility] PICC LINE PLACEMENT  Performed by: Ran Rosenberg RN  Assisting provider: Get Franco LPN  Consent Done: Yes  Time out: Immediately prior to procedure a time out was called to verify the correct patient, procedure, equipment, support staff and site/side marked as required  Indications: med administration  Anesthesia: local infiltration  Local anesthetic: lidocaine 1% without epinephrine  Anesthetic Total (mL): 5  Preparation: skin prepped with ChloraPrep  Skin prep agent dried: skin prep agent completely dried prior to procedure  Sterile barriers: all five maximum sterile barriers used - cap, mask, sterile gown, sterile gloves, and large sterile sheet  Hand hygiene: hand hygiene performed prior to central venous catheter insertion  Location details: left brachial  Catheter type: double lumen  Catheter size: 5 Fr  Catheter Length: 46cm    Ultrasound guidance: yes  Vessel Caliber: medium and patent, compressibility normal  Vascular Doppler: not done  Needle advanced into vessel with real time Ultrasound guidance.  Guidewire confirmed in vessel.  Image recorded and saved.  Sterile sheath used.  no esophageal manometryNumber of attempts: 1  Post-procedure: blood return through all ports, sterile dressing applied and chlorhexidine patch  Technical procedures used: PICC  Specimens: No  Implants: No  Assessment: placement verified by x-ray  Complications: none        Name Get Franco LPN   12/19/2023    "

## 2023-12-19 NOTE — PT/OT/SLP RE-EVAL
Occupational Therapy   Re-evaluation  Co- Treatment with PT     Name: Bj Pate Jr.  MRN: [id number]  Admitting Diagnosis:  Acute osteomyelitis of lumbar spine  Recent Surgery: Procedure(s) (LRB):  L-1 TO L-4 FUSION, SPINE, LUMBAR, TLIF, POSTERIOR APPROACH, USING PEDICLE SCREW (N/A) 4 Days Post-Op    Recommendations:     Discharge Recommendations:  Moderate Intensity Therapy   Discharge Equipment Recommendations:  Lift device, hospital bed, Wheelchair   Barriers to discharge:    Increased level of assist, Inaccessible home, and Decreased caregiver support     Assessment:     Bj Pate Jr. is a 73 y.o. male with a medical diagnosis of Acute osteomyelitis of lumbar spine.  He presents with decrease functional status secondary to medical diagnosis.  Performance deficits affecting function are weakness, impaired endurance, impaired self care skills, impaired functional mobility, impaired balance, gait instability, decreased safety awareness, pain, decreased lower extremity function, decreased upper extremity function, impaired fine motor, impaired skin, edema, impaired cardiopulmonary response to activity, impaired coordination, impaired cognition, decreased ROM.  Patient limited participation in therapy due to increased lethargy leading to increased eye closure during evaluation. In addition, patient noted to become hypotensive during EOB sitting; SBP noted as 117, 118, 104. Patient dependent with all functional mobility at this time due to severe limited functional status/weakness of BUE, BLE, and trunk instability. Patient is therefore appropriate for acute OT services to increase patient self care performance and functional mobility. Following DC from [facility] patient should continue with a moderate intensity therapy to ensure patient safety and promote return to independence.       Rehab Prognosis:  Good; patient would benefit from acute skilled OT services 3x a week to address these deficits and reach maximum  level of function.       Plan:     Patient to be seen   to address the above listed problems via    Plan of Care Expires:  1/19/23  Plan of Care Reviewed with:  Patient     Subjective     Chief Complaint: None at this time   Patient/Family stated goals: DC  Communicated with: RN prior to session.  Pain/Comfort:   Pain Rating 1:  (unable to rate)  Location - Side 1: Bilateral  Location - Orientation 1: generalized  Location 1: arm  Pain Addressed 1: Reposition, Distraction    Objective:     Communicated with: RN prior to session.  Patient found supine with:   upon OT entry to room.    General Precautions: Standard,    Orthopedic Precautions:  NA   Braces:  LSO- patient LSO not currently fitting around patient waist; MD informed   Respiratory Status: Room air    Occupational Performance:    Bed Mobility:    Patient completed Rolling/Turning to Left with  dependent and 2 persons  Patient completed Rolling/Turning to Right with dependent and 2 persons  Patient completed Scooting/Bridging with dependent and 2 persons  Patient completed Supine to Sit with dependent and 2 persons  Patient completed Sit to Supine with dependent and 2 persons    Functional Mobility/Transfers:  Deferred due to patient trunk instability     Activities of Daily Living:  Not attempted at this time due to patient instability   - Toileting: dependence at this time, patient soiled at this time; RN/PT/OT completed patient hygiene      Cognitive/Visual Perceptual:  Cognitive/Psychosocial Skills:     -       Oriented to: Person   -       Follows Commands/attention:Follows one-step commands ~25%     Physical Exam:  Sensation:    -       Impaired; patient BUE hypersensitive and swollen at this time   Patient BUE strength and ROM unable to be formally assessed at this time due to patient limited participation 2/2 fatigue this date. All UE with increased pitting edema at this time. Patient UE to continue to be evaluated at this time.     [facility] 6  Click:  [facility] Total Score:      Treatment & Education:  Co-evaluation completed due to patient medical instability and to ensure patient safety. Provided education regarding role of OT, POC, & discharge recommendations.  Pt with no further questions/concerns at this time. OT provided education on home recommendations and fall prevention in preparation for D/C.     Patient left HOB elevated with all lines intact, call button in reach, and RN notified    GOALS:   Multidisciplinary Problems       Occupational Therapy Goals          Problem: Occupational Therapy    Goal Priority Disciplines Outcome Interventions   Occupational Therapy Goal     OT, PT/OT Ongoing, Progressing    Description: Goals to be met by: 1/19/24     Patient will increase functional independence with ADLs by performing:    UE Dressing with Moderate Assistance.  LE Dressing with Maximum Assistance.  Grooming while seated with Moderate Assistance.  Toileting from bedside commode with Maximum Assistance for hygiene and clothing management.   Step transfer with Moderate Assistance  Toilet transfer to bedside commode with Moderate Assistance.                         History:     Past Medical History:   Diagnosis Date    Acute on chronic systolic heart failure 12/13/2023    Anemia 06/04/2021    Anticoagulant long-term use     Basal ganglia hemorrhage     Left basal ganglia hemorrhage with resultant right-sided hemiparesis which has resolved.     Benign hypertension with CKD (chronic kidney disease) stage III      Cataract     Chronic idiopathic gout of multiple sites     Chronic kidney disease, stage 3     COPD (chronic obstructive pulmonary disease)     Erectile dysfunction     Gout     Hemorrhoids without complication     Hyperlipidemia     Morbid obesity     Obstructive sleep apnea on CPAP     Reactive airway disease without complication 11/12/2021    Severe sepsis 12/11/2023    Stroke 2016, 2006    Thalamic infarct, acute (right) 01/2016    Type 2 DM  with CKD stage 3 and hypertension     On pravastatin for cardiovascular protection.          Past Surgical History:   Procedure Laterality Date    CARPAL TUNNEL RELEASE Left 2/19/2020    Procedure: RELEASE, CARPAL TUNNEL LEFT;  Surgeon: Maria Luisa Mccurdy MD;  Location: [facility] OR;  Service: Orthopedics;  Laterality: Left;    COLONOSCOPY N/A 7/28/2023    Procedure: COLONOSCOPY;  Surgeon: Jaylene Alvarado MD;  Location: [facility] ENDO;  Service: Endoscopy;  Laterality: N/A;    EPIDURAL STEROID INJECTION N/A 5/2/2019    Procedure: Injection, Steroid, Epidural Cervical;  Surgeon: Dariel Doan Jr., MD;  Location: [facility];  Service: Pain Management;  Laterality: N/A;  Cervical Epidural Steroid Injection     [id number]    Arrive @ 1130; ASA; Check BG    EPIDURAL STEROID INJECTION Bilateral 5/29/2019    Procedure: Lumbar Medial Branch Blocks;  Surgeon: Dariel Doan Jr., MD;  Location: [facility];  Service: Pain Management;  Laterality: Bilateral;  Bilateral Lumbar Medial Branch Blocks L3, L4, L5    [id number]  [id number]    Attive @ 1030 (11 arrival request); NO Sedation; ASA; Check BG    EPIDURAL STEROID INJECTION Bilateral 7/3/2019    Procedure: Lumbar Medial Branch Blocks;  Surgeon: Dariel Doan Jr., MD;  Location: [facility];  Service: Pain Management;  Laterality: Bilateral;  Bilateral Lumbar Medial Branch Blocks L3, L4, L5    [id number]  [id number]    Arrive @ 0930; NO Sedation; ASA; Check BG    EPIDURAL STEROID INJECTION N/A 8/7/2019    Procedure: Injection, Steroid, Epidural Cervical;  Surgeon: Dariel Doan Jr., MD;  Location: [facility];  Service: Pain Management;  Laterality: N/A;  Cervical Epidural Steroid Injection C7-T1    01370    Arrive @ 1115; Last ASA 7/30; Check BG    ESOPHAGOGASTRODUODENOSCOPY N/A 7/28/2023    Procedure: EGD (ESOPHAGOGASTRODUODENOSCOPY);  Surgeon: Jaylene Alvarado MD;  Location: [facility];  Service: Endoscopy;  Laterality: N/A;  inst via portal    FUSION, SPINE, LUMBAR, TLIF, POSTERIOR  APPROACH, USING PEDICLE SCREW N/A 12/15/2023    Procedure: L-1 TO L-4 FUSION, SPINE, LUMBAR, TLIF, POSTERIOR APPROACH, USING PEDICLE SCREW;  Surgeon: Rolf Rincon MD;  Location: [facility] OR [address] [place] [address];  Service: Neurosurgery;  Laterality: N/A;    LEFT HEART CATHETERIZATION Left 9/21/2021    Procedure: Left heart cath 9am start, R rad access;  Surgeon: Dariel Conner MD;  Location: [facility] CATH LAB;  Service: Cardiology;  Laterality: Left;  RN PRE OP Covid NEGATIVE ON  9-20-21.  C A    MIDLINE CATHETER PLACEMENT  12/12/2023    NO PAST SURGERIES         Time Tracking:     OT Date of Treatment:  12/19/23  OT Start Time:  1038  OT Stop Time:  1112  OT Total Time (min): 34 min      Billable Minutes:Evaluation 10 minutes   Therapeutic Activity 10 minutes   Neuromuscular Re-education 14 minutes     12/19/2023

## 2023-12-20 ENCOUNTER — OUTPATIENT CASE MANAGEMENT (OUTPATIENT)
Dept: ADMINISTRATIVE | Facility: OTHER | Age: 73
End: 2023-12-20
Payer: MEDICARE

## 2023-12-20 PROBLEM — L30.4 INTERTRIGINOUS DERMATITIS ASSOCIATED WITH MOISTURE: Status: ACTIVE | Noted: 2023-12-20

## 2023-12-20 LAB
ALBUMIN SERPL BCP-MCNC: 1.5 G/DL (ref 3.5–5.2)
ALLENS TEST: ABNORMAL
ALP SERPL-CCNC: 125 U/L (ref 55–135)
ALT SERPL W/O P-5'-P-CCNC: 48 U/L (ref 10–44)
AMMONIA PLAS-SCNC: 26 UMOL/L (ref 10–50)
ANION GAP SERPL CALC-SCNC: 8 MMOL/L (ref 8–16)
ANISOCYTOSIS BLD QL SMEAR: SLIGHT
AST SERPL-CCNC: 73 U/L (ref 10–40)
BASO STIPL BLD QL SMEAR: ABNORMAL
BASOPHILS # BLD AUTO: ABNORMAL K/UL (ref 0–0.2)
BASOPHILS NFR BLD: 0 % (ref 0–1.9)
BILIRUB SERPL-MCNC: 0.5 MG/DL (ref 0.1–1)
BUN SERPL-MCNC: 41 MG/DL (ref 8–23)
CALCIUM SERPL-MCNC: 8.8 MG/DL (ref 8.7–10.5)
CHLORIDE SERPL-SCNC: 106 MMOL/L (ref 95–110)
CO2 SERPL-SCNC: 26 MMOL/L (ref 23–29)
CREAT SERPL-MCNC: 0.8 MG/DL (ref 0.5–1.4)
DACRYOCYTES BLD QL SMEAR: ABNORMAL
DELSYS: ABNORMAL
DIFFERENTIAL METHOD BLD: ABNORMAL
DOHLE BOD BLD QL SMEAR: PRESENT
EOSINOPHIL # BLD AUTO: ABNORMAL K/UL (ref 0–0.5)
EOSINOPHIL NFR BLD: 1 % (ref 0–8)
ERYTHROCYTE [DISTWIDTH] IN BLOOD BY AUTOMATED COUNT: 14.9 % (ref 11.5–14.5)
EST. GFR  (NO RACE VARIABLE): >60 ML/MIN/1.73 M^2
GIANT PLATELETS BLD QL SMEAR: PRESENT
GLUCOSE SERPL-MCNC: 174 MG/DL (ref 70–110)
HCO3 UR-SCNC: 26.2 MMOL/L (ref 24–28)
HCT VFR BLD AUTO: 24.3 % (ref 40–54)
HGB BLD-MCNC: 7.8 G/DL (ref 14–18)
HYPOCHROMIA BLD QL SMEAR: ABNORMAL
IMM GRANULOCYTES # BLD AUTO: ABNORMAL K/UL (ref 0–0.04)
IMM GRANULOCYTES NFR BLD AUTO: ABNORMAL % (ref 0–0.5)
LYMPHOCYTES # BLD AUTO: ABNORMAL K/UL (ref 1–4.8)
LYMPHOCYTES NFR BLD: 3 % (ref 18–48)
MAGNESIUM SERPL-MCNC: 1.9 MG/DL (ref 1.6–2.6)
MCH RBC QN AUTO: 29.8 PG (ref 27–31)
MCHC RBC AUTO-ENTMCNC: 32.1 G/DL (ref 32–36)
MCV RBC AUTO: 93 FL (ref 82–98)
METAMYELOCYTES NFR BLD MANUAL: 3 %
MODE: ABNORMAL
MONOCYTES # BLD AUTO: ABNORMAL K/UL (ref 0.3–1)
MONOCYTES NFR BLD: 8 % (ref 4–15)
MYELOCYTES NFR BLD MANUAL: 4 %
NEUTROPHILS NFR BLD: 78 % (ref 38–73)
NEUTS BAND NFR BLD MANUAL: 3 %
NRBC BLD-RTO: 0 /100 WBC
OVALOCYTES BLD QL SMEAR: ABNORMAL
PCO2 BLDA: 34.7 MMHG (ref 35–45)
PH SMN: 7.49 [PH] (ref 7.35–7.45)
PHOSPHATE SERPL-MCNC: 1.7 MG/DL (ref 2.7–4.5)
PLATELET # BLD AUTO: 418 K/UL (ref 150–450)
PLATELET BLD QL SMEAR: ABNORMAL
PMV BLD AUTO: 10.1 FL (ref 9.2–12.9)
PO2 BLDA: 68 MMHG (ref 80–100)
POC BE: 3 MMOL/L
POC SATURATED O2: 95 % (ref 95–100)
POC TCO2: 27 MMOL/L (ref 23–27)
POCT GLUCOSE: 176 MG/DL (ref 70–110)
POCT GLUCOSE: 182 MG/DL (ref 70–110)
POCT GLUCOSE: 186 MG/DL (ref 70–110)
POCT GLUCOSE: 187 MG/DL (ref 70–110)
POCT GLUCOSE: 198 MG/DL (ref 70–110)
POIKILOCYTOSIS BLD QL SMEAR: SLIGHT
POLYCHROMASIA BLD QL SMEAR: ABNORMAL
POTASSIUM SERPL-SCNC: 3.9 MMOL/L (ref 3.5–5.1)
PROT SERPL-MCNC: 6.5 G/DL (ref 6–8.4)
RBC # BLD AUTO: 2.62 M/UL (ref 4.6–6.2)
SAMPLE: ABNORMAL
SCHISTOCYTES BLD QL SMEAR: ABNORMAL
SCHISTOCYTES BLD QL SMEAR: PRESENT
SITE: ABNORMAL
SODIUM SERPL-SCNC: 140 MMOL/L (ref 136–145)
SP02: 99
SPHEROCYTES BLD QL SMEAR: ABNORMAL
TARGETS BLD QL SMEAR: ABNORMAL
VANCOMYCIN TROUGH SERPL-MCNC: 25.5 UG/ML (ref 10–22)
WBC # BLD AUTO: 21.56 K/UL (ref 3.9–12.7)
WBC TOXIC VACUOLES BLD QL SMEAR: PRESENT

## 2023-12-20 PROCEDURE — 83735 ASSAY OF MAGNESIUM: CPT

## 2023-12-20 PROCEDURE — 25000003 PHARM REV CODE 250

## 2023-12-20 PROCEDURE — 63600175 PHARM REV CODE 636 W HCPCS: Performed by: STUDENT IN AN ORGANIZED HEALTH CARE EDUCATION/TRAINING PROGRAM

## 2023-12-20 PROCEDURE — 25000003 PHARM REV CODE 250: Performed by: REGISTERED NURSE

## 2023-12-20 PROCEDURE — 99222 1ST HOSP IP/OBS MODERATE 55: CPT | Mod: ,,, | Performed by: NURSE PRACTITIONER

## 2023-12-20 PROCEDURE — 11000001 HC ACUTE MED/SURG PRIVATE ROOM

## 2023-12-20 PROCEDURE — 63600175 PHARM REV CODE 636 W HCPCS: Performed by: PSYCHIATRY & NEUROLOGY

## 2023-12-20 PROCEDURE — 25000003 PHARM REV CODE 250: Performed by: NEUROLOGICAL SURGERY

## 2023-12-20 PROCEDURE — 99233 SBSQ HOSP IP/OBS HIGH 50: CPT | Mod: FS,,, | Performed by: PSYCHIATRY & NEUROLOGY

## 2023-12-20 PROCEDURE — 99499 UNLISTED E&M SERVICE: CPT | Mod: ,,,

## 2023-12-20 PROCEDURE — 80202 ASSAY OF VANCOMYCIN: CPT | Performed by: NEUROLOGICAL SURGERY

## 2023-12-20 PROCEDURE — 82803 BLOOD GASES ANY COMBINATION: CPT

## 2023-12-20 PROCEDURE — 25000242 PHARM REV CODE 250 ALT 637 W/ HCPCS: Performed by: PSYCHIATRY & NEUROLOGY

## 2023-12-20 PROCEDURE — A4216 STERILE WATER/SALINE, 10 ML: HCPCS | Performed by: PSYCHIATRY & NEUROLOGY

## 2023-12-20 PROCEDURE — 97535 SELF CARE MNGMENT TRAINING: CPT

## 2023-12-20 PROCEDURE — 63600175 PHARM REV CODE 636 W HCPCS

## 2023-12-20 PROCEDURE — 80053 COMPREHEN METABOLIC PANEL: CPT

## 2023-12-20 PROCEDURE — 36600 WITHDRAWAL OF ARTERIAL BLOOD: CPT

## 2023-12-20 PROCEDURE — 97112 NEUROMUSCULAR REEDUCATION: CPT

## 2023-12-20 PROCEDURE — 25000003 PHARM REV CODE 250: Performed by: PSYCHIATRY & NEUROLOGY

## 2023-12-20 PROCEDURE — 85007 BL SMEAR W/DIFF WBC COUNT: CPT

## 2023-12-20 PROCEDURE — 94799 UNLISTED PULMONARY SVC/PX: CPT | Mod: XB

## 2023-12-20 PROCEDURE — 25000003 PHARM REV CODE 250: Performed by: STUDENT IN AN ORGANIZED HEALTH CARE EDUCATION/TRAINING PROGRAM

## 2023-12-20 PROCEDURE — 84100 ASSAY OF PHOSPHORUS: CPT

## 2023-12-20 PROCEDURE — 82140 ASSAY OF AMMONIA: CPT

## 2023-12-20 PROCEDURE — 94640 AIRWAY INHALATION TREATMENT: CPT

## 2023-12-20 PROCEDURE — 94761 N-INVAS EAR/PLS OXIMETRY MLT: CPT

## 2023-12-20 PROCEDURE — 85027 COMPLETE CBC AUTOMATED: CPT

## 2023-12-20 PROCEDURE — 97530 THERAPEUTIC ACTIVITIES: CPT

## 2023-12-20 PROCEDURE — 99900035 HC TECH TIME PER 15 MIN (STAT)

## 2023-12-20 PROCEDURE — 99233 SBSQ HOSP IP/OBS HIGH 50: CPT | Mod: ,,, | Performed by: INTERNAL MEDICINE

## 2023-12-20 PROCEDURE — 25000003 PHARM REV CODE 250: Performed by: PHYSICIAN ASSISTANT

## 2023-12-20 PROCEDURE — 92526 ORAL FUNCTION THERAPY: CPT

## 2023-12-20 PROCEDURE — 94660 CPAP INITIATION&MGMT: CPT

## 2023-12-20 RX ORDER — GLUCAGON 1 MG
1 KIT INJECTION
Status: DISCONTINUED | OUTPATIENT
Start: 2023-12-20 | End: 2023-12-29 | Stop reason: HOSPADM

## 2023-12-20 RX ORDER — FUROSEMIDE 10 MG/ML
60 INJECTION INTRAMUSCULAR; INTRAVENOUS ONCE
Status: COMPLETED | OUTPATIENT
Start: 2023-12-20 | End: 2023-12-20

## 2023-12-20 RX ORDER — INSULIN ASPART 100 [IU]/ML
0-10 INJECTION, SOLUTION INTRAVENOUS; SUBCUTANEOUS
Status: DISCONTINUED | OUTPATIENT
Start: 2023-12-20 | End: 2023-12-29 | Stop reason: HOSPADM

## 2023-12-20 RX ORDER — FUROSEMIDE 10 MG/ML
40 INJECTION INTRAMUSCULAR; INTRAVENOUS ONCE
Status: COMPLETED | OUTPATIENT
Start: 2023-12-20 | End: 2023-12-20

## 2023-12-20 RX ADMIN — POTASSIUM & SODIUM PHOSPHATES POWDER PACK 280-160-250 MG 2 PACKET: 280-160-250 PACK at 02:12

## 2023-12-20 RX ADMIN — POTASSIUM & SODIUM PHOSPHATES POWDER PACK 280-160-250 MG 2 PACKET: 280-160-250 PACK at 05:12

## 2023-12-20 RX ADMIN — MUPIROCIN: 20 OINTMENT TOPICAL at 08:12

## 2023-12-20 RX ADMIN — Medication 10 ML: at 12:12

## 2023-12-20 RX ADMIN — MEROPENEM 2 G: 1 INJECTION INTRAVENOUS at 02:12

## 2023-12-20 RX ADMIN — ENOXAPARIN SODIUM 40 MG: 40 INJECTION SUBCUTANEOUS at 06:12

## 2023-12-20 RX ADMIN — GABAPENTIN 200 MG: 100 CAPSULE ORAL at 08:12

## 2023-12-20 RX ADMIN — INSULIN ASPART 2 UNITS: 100 INJECTION, SOLUTION INTRAVENOUS; SUBCUTANEOUS at 12:12

## 2023-12-20 RX ADMIN — VANCOMYCIN HYDROCHLORIDE 1750 MG: 500 INJECTION, POWDER, LYOPHILIZED, FOR SOLUTION INTRAVENOUS at 12:12

## 2023-12-20 RX ADMIN — FLUTICASONE FUROATE AND VILANTEROL TRIFENATATE 1 PUFF: 200; 25 POWDER RESPIRATORY (INHALATION) at 08:12

## 2023-12-20 RX ADMIN — Medication 800 MG: at 02:12

## 2023-12-20 RX ADMIN — Medication 10 ML: at 06:12

## 2023-12-20 RX ADMIN — MEROPENEM 2 G: 1 INJECTION INTRAVENOUS at 08:12

## 2023-12-20 RX ADMIN — POTASSIUM BICARBONATE 35 MEQ: 391 TABLET, EFFERVESCENT ORAL at 02:12

## 2023-12-20 RX ADMIN — MODAFINIL 200 MG: 100 TABLET ORAL at 05:12

## 2023-12-20 RX ADMIN — FUROSEMIDE 60 MG: 10 INJECTION, SOLUTION INTRAMUSCULAR; INTRAVENOUS at 08:12

## 2023-12-20 RX ADMIN — PSYLLIUM HUSK 1 PACKET: 3.4 POWDER ORAL at 08:12

## 2023-12-20 RX ADMIN — GABAPENTIN 200 MG: 100 CAPSULE ORAL at 02:12

## 2023-12-20 RX ADMIN — INSULIN ASPART 1 UNITS: 100 INJECTION, SOLUTION INTRAVENOUS; SUBCUTANEOUS at 09:12

## 2023-12-20 RX ADMIN — MEROPENEM 2 G: 1 INJECTION INTRAVENOUS at 05:12

## 2023-12-20 RX ADMIN — FUROSEMIDE 40 MG: 10 INJECTION, SOLUTION INTRAVENOUS at 11:12

## 2023-12-20 NOTE — PLAN OF CARE
12/20/23 1206   Post-Acute Status   Post-Acute Authorization Placement   Discharge Delays None known at this time   Discharge Plan   Discharge Plan A Long-term acute care facility (LTAC)     Update on LTAC Placement:    1.[facility] Phone: [phone]   - Yes, willing to accept patient  Comments: Thanks Mirella, i will come see him and reach out to his son. Chrissy [phone]      2.[facility] Phone: [phone]   -Referral Received  Comments: MAYURI Cameron [phone]      3.[facility] Phone: [phone]   - No response.    SW will continue to follow:    Mirella Hayward LMSW  PRN - [facility]  [id number]

## 2023-12-20 NOTE — PROGRESS NOTES
"[facility] - Neuro Critical Care  Neurocritical Care  Progress Note    Admit Date: 12/10/2023  Service Date: 12/19/2023  Length of Stay: 8    Subjective:     Chief Complaint: Acute osteomyelitis of lumbar spine    History of Present Illness: Bj Pate Jr. is a 73 year old male with a medical history significant for CKD3, HTN, DM2, COPD and a prior right thalamic infarct (2016) who is admitted to [facility] for post operative monitoring following a L1-L4 segmental posterolateral fusion, L2-L4 laminectomy and L2-L3 TLIF. Patient initially admitted to [facility] on 12/10 for evaluation of increasing lethargy, progressive encephalopathy and lower back pain. Work up revealed L2-L4 discitis/osteomyelitis and left psoas abscess. Started on Meropenem and Vancomycin per ID recommendations. S/p IR biopsy of psoas abscess and L2-L3 disc space. Cultures no growth to date. Underwent L1-L4 segmental posterolateral fusion, L2-L4 laminectomy and L2-L3 TLIF with noted purulent material noted at surgical site sent for culture.     On admission to [facility], patient with residual anesthesia effects. Oral airway in place. Opens eyes to voice and follows some basic commands in BLE (wiggles toes). No movement noted in BUE. Per documentation, prior to surgery, patient alert and oriented with GCS 15. Dysarthric and "moves all extremities spontaneously with good tone. BUE 2/5, BLE 2/5 except DF/PF/EHL 3/5."    Hospital Course: 12/16/2023: Went for CTA because of lethargy since surgery. Did not tolerate procedure, returned to [facility]. Sedated and intubated for MRI pan spine, as patient had post surgical changes in exam. Continuing Vanc and Deo.   12/17/2023 MRI completed last night. No further surgical plans per NSGY. Provigil given to improve wakefulness, will start daily. Extubated this afternoon, no issues.  12/18/2023: no longer requires MAP goals  12/19/2023: NAEON. PICC consulted placed for long-term IV antibiotic administration. Remains stable for " transfer to floor with .    Interval History:  see hospital course above    Review of Systems   Constitutional:  Negative for chills and fever.   HENT:  Negative for trouble swallowing.    Cardiovascular:  Negative for chest pain.   Gastrointestinal:  Negative for nausea and vomiting.   Neurological:  Positive for weakness. Negative for facial asymmetry, speech difficulty and headaches.   Psychiatric/Behavioral:  Positive for confusion.      Objective:     Vitals:  Temp: 98.6 °F (37 °C)  Pulse: 104  Rhythm: sinus tachycardia  BP: 123/69  MAP (mmHg): 88  Resp: (!) 21  SpO2: (!) 94 %    Temp  Min: 98 °F (36.7 °C)  Max: 99.4 °F (37.4 °C)  Pulse  Min: 103  Max: 115  BP  Min: 99/53  Max: 134/79  MAP (mmHg)  Min: 75  Max: 101  Resp  Min: 15  Max: 24  SpO2  Min: 94 %  Max: 99 %    12/18 0701 - 12/19 0700  In: 1521.4 [I.V.:206.2]  Out: 1605 [Urine:1550; Drains:55]   Unmeasured Output  Urine Occurrence: 1  Stool Occurrence: 1        Physical Exam  Vitals and nursing note reviewed.   Constitutional:       General: He is not in acute distress.     Appearance: Normal appearance.      Comments: Well developed. Well nourished. Resting comfortably in bed.   HENT:      Head: Normocephalic and atraumatic.      Right Ear: External ear normal.      Left Ear: External ear normal.      Nose: Nose normal.      Mouth/Throat:      Mouth: Mucous membranes are moist.      Pharynx: Oropharynx is clear.   Eyes:      General: No scleral icterus.     Extraocular Movements: Extraocular movements intact.      Pupils: Pupils are equal, round, and reactive to light.   Cardiovascular:      Rate and Rhythm: Normal rate and regular rhythm.   Pulmonary:      Effort: Pulmonary effort is normal. No respiratory distress.   Abdominal:      General: There is no distension.      Comments: Obese abdomen.   Musculoskeletal:      Right lower leg: No edema.      Left lower leg: No edema.   Skin:     General: Skin is warm and dry.   Neurological:      Mental  Status: He is alert.      Comments: E4V4M6.  Exam somewhat limited secondary to effort. Awake, alert, and oriented to person and time. Disoriented to place and situation but answers correctly when given choices. Speech fluent. Follows commands with minimal delay.    PERRL. EOMI. No facial asymmetry. Conversational hearing intact.   Moves all extremities spontaneously. Sensation intact to noxious stimuli in all 4 extremities.          Gait & coordination exams deferred.        Medications:  Continuous Scheduledenoxparin, 40 mg, Q24H (prophylaxis, 1700)  fluticasone furoate-vilanteroL, 1 puff, Daily  gabapentin, 200 mg, TID  meropenem (MERREM) IVPB, 2 g, Q8H  modafiniL, 200 mg, Before breakfast  mupirocin, , BID  polyethylene glycol, 17 g, Daily  senna-docusate 8.6-50 mg, 1 tablet, Daily  sodium chloride 0.9%, 10 mL, Q6H  vancomycin (VANCOCIN) IV (PEDS and ADULTS), 1,750 mg, Q24H    PRNacetaminophen, 650 mg, Q6H PRN  dextrose 10%, 12.5 g, PRN  dextrose 10%, 25 g, PRN  glucagon (human recombinant), 1 mg, PRN  glucose, 16 g, PRN  glucose, 24 g, PRN  insulin aspart U-100, 0-5 Units, Q6H PRN  magnesium oxide, 800 mg, PRN  magnesium oxide, 800 mg, PRN  morphine, 2 mg, Q4H PRN  ondansetron, 4 mg, Q8H PRN  potassium bicarbonate, 35 mEq, PRN  potassium bicarbonate, 50 mEq, PRN  potassium bicarbonate, 60 mEq, PRN  potassium, sodium phosphates, 2 packet, PRN  potassium, sodium phosphates, 2 packet, PRN  potassium, sodium phosphates, 2 packet, PRN  sodium chloride 0.9%, 10 mL, PRN  vancomycin - pharmacy to dose, , pharmacy to manage frequency      Today I personally reviewed pertinent medications, lines/drains/airways, imaging, laboratory results, notably:    Laboratory:  CBC:  Recent Labs   Lab 12/19/23  0145   WBC 18.47*   RBC 2.62*   HGB 7.7*   HCT 23.6*      MCV 90   MCH 29.4   MCHC 32.6       CMP:  Recent Labs   Lab 12/19/23 0145   CALCIUM 8.9   ALBUMIN 1.5*   PROT 6.3      K 3.8   CO2 25      BUN 33*    CREATININE 0.9   ALKPHOS 103   ALT 50*   AST 89*   BILITOT 0.7     Recent Labs   Lab 12/19/23  0145   MG 1.9   PHOS 2.7              Diet  Diet full liquid  Diet full liquid        Assessment/Plan:     Neuro  Septic encephalopathy  See Acute osteomyelitis of lumbar spine     Acute metabolic encephalopathy  -Exam improved following Provigil  -Neuro checks q4h    History of CVA (cerebrovascular accident)  -R thalamic, 2016    Cardiac/Vascular  Acute on chronic systolic heart failure  -EF 45-50%    Renal/  Urinary retention  - bladder scan Q6    ID  * Acute osteomyelitis of lumbar spine  73M CKD3, HTN, DM2, COPD and a prior right thalamic infarct (2016) who is admitted to [facility] for post operative monitoring following a L1-L4 segmental posterolateral fusion, L2-L4 laminectomy and L2-L3 TLIF for L2-L4 discitis/osteomyelitis.     S/p IR biopsy of psoas abscess and L2-L3 disc space. Cultures no growth to date.    -Admitted to [facility]  -VS/Neuro checks q4h  -Completed MAP goals  -Continue Meropenem and Vancomycin per ID recs  -PICC consult placed for long-term antibiotic administration   -Daily CMP, Mag, Phos - replete electrolytes PRN  -PT/OT/SLP as appropriate  -Provigil daily to improve wakefulness  - Remains stable to step down to     Discitis  -ID following  -See Acute osteomyelitis of lumbar spine     Endocrine  Malnutrition  See Acute osteomyelitis of lumbar spine     Class 2 obesity with body mass index (BMI) of 39.0 to 39.9 in adult  See Acute osteomyelitis of lumbar spine     GI  Psoas muscle abscess  -S/p IR biopsy  -Treating empirically w/ ABX    Orthopedic  Back pain  -Morphine PRN   -Remaining pain meds discontinued for now d/t increased somnolence           The patient is being Prophylaxed for:  Venous Thromboembolism with: Mechanical or Chemical  Stress Ulcer with: Not Applicable   Ventilator Pneumonia with: not applicable    Activity Orders            Diet full liquid: Full Liquid starting at 12/18 1030     Elevate HOB Elevate (30-45 degrees) Elevate HOB to 30 - 45 degrees during feeding unless otherwise stated starting at 12/13 1828    Turn patient every 2 hours starting at 12/12 0400    Progressive Mobility Protocol (mobilize patient to their highest level of functioning at least twice daily) starting at 12/11 0800          Full Code        Franny Balderas PA-C  Neurocritical Care  [facility] - Neuro Critical Care

## 2023-12-20 NOTE — ASSESSMENT & PLAN NOTE
-S/p IR biopsy  -Treating empirically w/ ABX   No pertinent family history in first degree relatives

## 2023-12-20 NOTE — SUBJECTIVE & OBJECTIVE
Interval History:  see hospital course above    Review of Systems   Constitutional:  Negative for chills and fever.   HENT:  Negative for trouble swallowing.    Cardiovascular:  Negative for chest pain.   Gastrointestinal:  Negative for nausea and vomiting.   Neurological:  Positive for weakness. Negative for facial asymmetry, speech difficulty and headaches.   Psychiatric/Behavioral:  Positive for confusion.      Objective:     Vitals:  Temp: 98.6 °F (37 °C)  Pulse: 104  Rhythm: sinus tachycardia  BP: 123/69  MAP (mmHg): 88  Resp: (!) 21  SpO2: (!) 94 %    Temp  Min: 98 °F (36.7 °C)  Max: 99.4 °F (37.4 °C)  Pulse  Min: 103  Max: 115  BP  Min: 99/53  Max: 134/79  MAP (mmHg)  Min: 75  Max: 101  Resp  Min: 15  Max: 24  SpO2  Min: 94 %  Max: 99 %    12/18 0701 - 12/19 0700  In: 1521.4 [I.V.:206.2]  Out: 1605 [Urine:1550; Drains:55]   Unmeasured Output  Urine Occurrence: 1  Stool Occurrence: 1        Physical Exam  Vitals and nursing note reviewed.   Constitutional:       General: He is not in acute distress.     Appearance: Normal appearance.      Comments: Well developed. Well nourished. Resting comfortably in bed.   HENT:      Head: Normocephalic and atraumatic.      Right Ear: External ear normal.      Left Ear: External ear normal.      Nose: Nose normal.      Mouth/Throat:      Mouth: Mucous membranes are moist.      Pharynx: Oropharynx is clear.   Eyes:      General: No scleral icterus.     Extraocular Movements: Extraocular movements intact.      Pupils: Pupils are equal, round, and reactive to light.   Cardiovascular:      Rate and Rhythm: Normal rate and regular rhythm.   Pulmonary:      Effort: Pulmonary effort is normal. No respiratory distress.   Abdominal:      General: There is no distension.      Comments: Obese abdomen.   Musculoskeletal:      Right lower leg: No edema.      Left lower leg: No edema.   Skin:     General: Skin is warm and dry.   Neurological:      Mental Status: He is alert.       Comments: E4V4M6.  Exam somewhat limited secondary to effort. Awake, alert, and oriented to person and time. Disoriented to place and situation but answers correctly when given choices. Speech fluent. Follows commands with minimal delay.    PERRL. EOMI. No facial asymmetry. Conversational hearing intact.   Moves all extremities spontaneously. Sensation intact to noxious stimuli in all 4 extremities.          Gait & coordination exams deferred.        Medications:  Continuous Scheduledenoxparin, 40 mg, Q24H (prophylaxis, 1700)  fluticasone furoate-vilanteroL, 1 puff, Daily  gabapentin, 200 mg, TID  meropenem (MERREM) IVPB, 2 g, Q8H  modafiniL, 200 mg, Before breakfast  mupirocin, , BID  polyethylene glycol, 17 g, Daily  senna-docusate 8.6-50 mg, 1 tablet, Daily  sodium chloride 0.9%, 10 mL, Q6H  vancomycin (VANCOCIN) IV (PEDS and ADULTS), 1,750 mg, Q24H    PRNacetaminophen, 650 mg, Q6H PRN  dextrose 10%, 12.5 g, PRN  dextrose 10%, 25 g, PRN  glucagon (human recombinant), 1 mg, PRN  glucose, 16 g, PRN  glucose, 24 g, PRN  insulin aspart U-100, 0-5 Units, Q6H PRN  magnesium oxide, 800 mg, PRN  magnesium oxide, 800 mg, PRN  morphine, 2 mg, Q4H PRN  ondansetron, 4 mg, Q8H PRN  potassium bicarbonate, 35 mEq, PRN  potassium bicarbonate, 50 mEq, PRN  potassium bicarbonate, 60 mEq, PRN  potassium, sodium phosphates, 2 packet, PRN  potassium, sodium phosphates, 2 packet, PRN  potassium, sodium phosphates, 2 packet, PRN  sodium chloride 0.9%, 10 mL, PRN  vancomycin - pharmacy to dose, , pharmacy to manage frequency      Today I personally reviewed pertinent medications, lines/drains/airways, imaging, laboratory results, notably:    Laboratory:  CBC:  Recent Labs   Lab 12/19/23 0145   WBC 18.47*   RBC 2.62*   HGB 7.7*   HCT 23.6*      MCV 90   MCH 29.4   MCHC 32.6       CMP:  Recent Labs   Lab 12/19/23 0145   CALCIUM 8.9   ALBUMIN 1.5*   PROT 6.3      K 3.8   CO2 25      BUN 33*   CREATININE 0.9   ALKPHOS  103   ALT 50*   AST 89*   BILITOT 0.7     Recent Labs   Lab 12/19/23  0145   MG 1.9   PHOS 2.7              Diet  Diet full liquid  Diet full liquid

## 2023-12-20 NOTE — ASSESSMENT & PLAN NOTE
74 yo male with PMH of CVA, CKD3, HTN, DM2, COPD admitted with increasing lethargy s/p fall found to have L psoas abscess/lumbar OM s/p IR aspiration of L2/3 disc space (cx ngtd) and washout with lumbar fusion/TLIF/laminectomy with NSGY on 12/15 - post-op notable for lethargy with transfer to ICU requiring intubation and pressors. Op note with purulent material, cx neg and path in process, so far no growth to date. Post-op MRI revealed stable lumbar spondylodiscitis and L psoas abscess, possible cervical spondylodiscitis and bibasilar consolidations.     WBC uptrending, but afebrile. Eval by neuro surgery this morning and HV and WV removed. Per nurse, wound did not appear infected. Unable to turn during exam for evaluation. Currently with loose stools, but on stool softener, so not suspecting cdiff.       Recommendations:  - continue empiric vancomycin. pharm to dose and meropenem. Anticipate 6-8 wks Iv abx.  - follow up path  - agree with US of extremities x 4

## 2023-12-20 NOTE — SUBJECTIVE & OBJECTIVE
Interval History:    12/20: NAEON. AF, 103-114 HR. FC BUE L>R and BLE wiggling toes. Wound vac and HV x2 removed today. Okay for stepdown to       Medications:  Continuous Infusions:    Scheduled Meds:   acetaminophen  1,000 mg Per NG tube Q8H    fentaNYL  50 mcg Intravenous Once    fluticasone furoate-vilanteroL  1 puff Inhalation Daily    gabapentin  200 mg Per NG tube TID    meropenem (MERREM) IVPB  2 g Intravenous Q8H    modafiniL  200 mg Per NG tube Before breakfast    mupirocin   Nasal BID    polyethylene glycol  17 g Per NG tube BID    senna-docusate 8.6-50 mg  1 tablet Per NG tube Daily    vancomycin (VANCOCIN) IV (PEDS and ADULTS)  1,750 mg Intravenous Q24H     PRN Meds:aluminum-magnesium hydroxide-simethicone, dextrose 10%, dextrose 10%, diphenhydrAMINE, glucagon (human recombinant), glucose, glucose, insulin aspart U-100, levalbuterol, magnesium oxide, magnesium oxide, morphine, naloxone, ondansetron, potassium bicarbonate, potassium bicarbonate, potassium bicarbonate, potassium, sodium phosphates, potassium, sodium phosphates, potassium, sodium phosphates, sodium chloride 0.9%, sodium chloride 0.9%, Pharmacy to dose Vancomycin consult **AND** vancomycin - pharmacy to dose     Review of Systems  Objective:     Weight: 119.7 kg (264 lb)  Body mass index is 38.99 kg/m².  Vital Signs (Most Recent):  Temp: 97.8 °F (36.6 °C) (12/18/23 0730)  Pulse: (!) 111 (12/18/23 0905)  Resp: (!) 23 (12/18/23 0905)  BP: (!) 110/56 (12/18/23 0905)  SpO2: 98 % (12/18/23 0905) Vital Signs (24h Range):  Temp:  [97.8 °F (36.6 °C)-98.6 °F (37 °C)] 97.8 °F (36.6 °C)  Pulse:  [] 111  Resp:  [15-25] 23  SpO2:  [96 %-100 %] 98 %  BP: ()/(51-95) 110/56  Arterial Line BP: ()/() 88/82     Date 12/18/23 0700 - 12/19/23 0659   Shift 0215-2641 3323-1814 8072-5350 24 Hour Total   INTAKE   I.V.(mL/kg) 46.1(0.4)   46.1(0.4)   Shift Total(mL/kg) 46.1(0.4)   46.1(0.4)   OUTPUT   Urine(mL/kg/hr) 275   275   Shift  Total(mL/kg) 275(2.3)   275(2.3)   Weight (kg) 119.7 119.7 119.7 119.7                Vent Mode: Spont  Oxygen Concentration (%):  [40] 40  Resp Rate Total:  [17 br/min-19 br/min] 17 br/min  Vt Set:  [480 mL] 480 mL  PEEP/CPAP:  [5 cmH20] 5 cmH20  Pressure Support:  [5 cmH20] 5 cmH20  Mean Airway Pressure:  [7.5 cmH20-9.3 cmH20] 7.5 cmH20             Closed/Suction Drain 12/15/23 1721 Tube - 1 Right Back Accordion 10 Fr. (Active)   Dressing Status Clean;Dry;Intact 12/16/23 1101   Dressing Intervention Integrity maintained 12/16/23 1101   Drainage Sanguineous;Bright red;Bloody 12/16/23 1101   Status Other (Comment) 12/16/23 1101   Output (mL) 160 mL 12/16/23 0528            Closed/Suction Drain 12/15/23 1722 Tube - 2 Left Back Accordion 10 Fr. (Active)   Dressing Status Clean;Dry;Intact 12/16/23 1101   Dressing Intervention Integrity maintained 12/16/23 1101   Drainage Sanguineous;Bright red;Bloody 12/16/23 1101   Status Other (Comment) 12/16/23 1101   Output (mL) 70 mL 12/16/23 0528            NG/OG Tube 12/14/23 0800 nasogastric Left nostril (Active)   Placement Check placement verified by x-ray 12/16/23 1101   Tolerance no signs/symptoms of discomfort 12/16/23 1101   Securement secured to nostril center w/ adhesive device 12/16/23 1101   Clamp Status/Tolerance clamped 12/16/23 1101   Suction Setting/Drainage Method suction at the bedside 12/16/23 1101   Insertion Site Appearance no redness, warmth, tenderness, skin breakdown, drainage 12/16/23 1101   Drainage None 12/16/23 1101   Flush/Irrigation flushed w/;water;no resistance met 12/16/23 1101   Feeding Type continuous 12/14/23 1226   Feeding Action feeding held 12/16/23 1101   Current Rate (mL/hr) 10 mL/hr 12/14/23 1226   Intake (mL) 50 mL 12/15/23 0006   Water Bolus (mL) 250 mL 12/14/23 2026   Tube Output(mL)(Include Discarded Residual) 0 mL 12/15/23 0006   Intake (mL) - Formula Tube Feeding 15 12/14/23 2026            Urethral Catheter 12/15/23 1636  "Straight-tip;Silicone 16 Fr. (Active)   Site Assessment Clean;Intact 12/16/23 1101   Collection Container Standard drainage bag 12/16/23 1101   Securement Method secured to top of thigh w/ adhesive device 12/16/23 1101   Catheter Care Performed yes 12/16/23 1101   Reason for Continuing Urinary Catheterization Post operative 12/16/23 1101   CAUTI Prevention Bundle Securement Device in place with 1" slack;Intact seal between catheter & drainage tubing;Drainage bag/urimeter off the floor;Sheeting clip in use;No dependent loops or kinks;Drainage bag/urimeter not overfilled (<2/3 full);Drainage bag/urimeter below bladder 12/16/23 1101   Output (mL) 125 mL 12/16/23 1101          Physical Exam       [id number]  OX1  Following commands   BUE 2/5 L>R  BLE wiggles toes  HV and WVAC removed, sites c/d/i    Significant Labs:  Recent Labs   Lab 12/17/23  0335 12/18/23  0113   * 108   * 136   K 4.2 4.2    104   CO2 24 25   BUN 43* 38*   CREATININE 1.1 0.9   CALCIUM 9.1 8.8   MG 2.0 1.9       Recent Labs   Lab 12/17/23  0335 12/17/23  0415 12/18/23  0113   WBC 20.15*  --  16.97*   HGB 8.2*  --  7.9*   HCT 24.2* 25* 24.2*     --  333       No results for input(s): "LABPT", "INR", "APTT" in the last 48 hours.    Microbiology Results (last 7 days)       Procedure Component Value Units Date/Time    Aerobic culture [[id number]] Collected: 12/15/23 1655    Order Status: Completed Specimen: Wound from Back Updated: 12/18/23 0915     Aerobic Bacterial Culture No growth    Narrative:      2.) 2,3 Disc space    Aerobic culture [[id number]] Collected: 12/15/23 1603    Order Status: Completed Specimen: Wound from Back Updated: 12/18/23 0915     Aerobic Bacterial Culture No growth    Narrative:      Paraspinal    Aerobic culture [[id number]] Collected: 12/15/23 1657    Order Status: Completed Specimen: Wound from Back Updated: 12/18/23 0913     Aerobic Bacterial Culture No growth    Narrative:      3.) 2,3 Disc space    " Blood culture [[id number]] Collected: 12/12/23 0033    Order Status: Completed Specimen: Blood Updated: 12/17/23 0612     Blood Culture, Routine No growth after 5 days.    Blood culture [[id number]] Collected: 12/12/23 0033    Order Status: Completed Specimen: Blood Updated: 12/17/23 0612     Blood Culture, Routine No growth after 5 days.    AFB Culture & Smear [[id number]] Collected: 12/15/23 1603    Order Status: Completed Specimen: Wound from Back Updated: 12/16/23 2127     AFB Culture & Smear Culture in progress    Narrative:      Paraspinal    AFB Culture & Smear [[id number]] Collected: 12/15/23 1655    Order Status: Completed Specimen: Wound from Back Updated: 12/16/23 2127     AFB Culture & Smear Culture in progress    Narrative:      2.) 2,3 Disc space    AFB Culture & Smear [[id number]] Collected: 12/15/23 1657    Order Status: Completed Specimen: Wound from Back Updated: 12/16/23 2127     AFB Culture & Smear Culture in progress    Narrative:      3.) 2,3 Disc space    Aerobic culture [[id number]] Collected: 12/13/23 1703    Order Status: Completed Specimen: Abscess from Buttocks, Left Updated: 12/16/23 1109     Aerobic Bacterial Culture No growth    Culture, Anaerobe [[id number]] Collected: 12/15/23 1655    Order Status: Completed Specimen: Wound from Back Updated: 12/16/23 0710     Anaerobic Culture Culture in progress    Narrative:      2.) 2,3 Disc space    Culture, Anaerobe [[id number]] Collected: 12/15/23 1657    Order Status: Completed Specimen: Wound from Back Updated: 12/16/23 0710     Anaerobic Culture Culture in progress    Narrative:      3.) 2,3 Disc space    Culture, Anaerobe [[id number]] Collected: 12/15/23 1603    Order Status: Completed Specimen: Wound from Back Updated: 12/16/23 0710     Anaerobic Culture Culture in progress    Narrative:      Paraspinal    Blood culture x two cultures. Draw prior to antibiotics. [[id number]] Collected: 12/10/23 2007    Order Status: Completed Specimen:  Blood from Peripheral, Antecubital, Left Updated: 12/15/23 2212     Blood Culture, Routine No growth after 5 days.    Narrative:      Aerobic and anaerobic    Blood culture x two cultures. Draw prior to antibiotics. [[id number]] Collected: 12/10/23 2007    Order Status: Completed Specimen: Blood from Peripheral, Hand, Left Updated: 12/15/23 2212     Blood Culture, Routine No growth after 5 days.    Narrative:      Aerobic and anaerobic    Gram stain [[id number]] Collected: 12/15/23 1657    Order Status: Completed Specimen: Wound from Back Updated: 12/15/23 1900     Gram Stain Result No WBC's      No organisms seen    Narrative:      3.) 2,3 Disc space    Gram stain [[id number]] Collected: 12/15/23 1655    Order Status: Completed Specimen: Wound from Back Updated: 12/15/23 1857     Gram Stain Result No WBC's      No organisms seen    Narrative:      2.) 2,3 Disc space    Gram stain [[id number]] Collected: 12/15/23 1603    Order Status: Completed Specimen: Wound from Back Updated: 12/15/23 1855     Gram Stain Result No WBC's      No organisms seen    Narrative:      Paraspinal    Fungus culture [[id number]] Collected: 12/15/23 1655    Order Status: Sent Specimen: Wound from Back Updated: 12/15/23 1730    Fungus culture [[id number]] Collected: 12/15/23 1657    Order Status: Sent Specimen: Wound from Back Updated: 12/15/23 1727    Fungus culture [[id number]] Collected: 12/15/23 1603    Order Status: Sent Specimen: Wound from Back Updated: 12/15/23 1621    Culture, Anaerobe [[id number]]     Order Status: No result Specimen: Bone from Back     Aerobic culture [[id number]]     Order Status: No result Specimen: Bone from Back     AFB Culture & Smear [[id number]]     Order Status: No result Specimen: Bone from Back     Gram stain [[id number]]     Order Status: No result Specimen: Bone from Back     Fungus culture [[id number]]     Order Status: No result Specimen: Bone from Back     Culture, Anaerobic [[id number]] Collected:  12/13/23 1703    Order Status: Completed Specimen: Abscess from Buttocks, Left Updated: 12/15/23 0707     Anaerobic Culture Culture in progress    AFB Culture & Smear [[id number]] Collected: 12/13/23 1703    Order Status: Completed Specimen: Abscess from Buttocks, Left Updated: 12/14/23 2128     AFB Culture & Smear Culture in progress     AFB CULTURE STAIN No acid fast bacilli seen.    Gram stain [[id number]] Collected: 12/13/23 1703    Order Status: Completed Specimen: Abscess from Buttocks, Left Updated: 12/13/23 2104     Gram Stain Result No WBC's      No organisms seen    Fungus culture [[id number]] Collected: 12/13/23 1703    Order Status: Sent Specimen: Abscess from Buttocks, Left Updated: 12/13/23 1928          All pertinent labs from the last 24 hours have been reviewed.    Significant Diagnostics:  I have reviewed and interpreted all pertinent imaging results/findings within the past 24 hours.  X-Ray Chest AP Portable    Result Date: 12/16/2023  As above Electronically signed by: Laure Reynoso MD Date:    12/16/2023 Time:    12:43    CT Head Without Contrast    Result Date: 12/16/2023  Severe motion distorted examination as detailed above essentially nondiagnostic portions of the vertex and skull base. No evidence for acute intracranial hemorrhage or sulcal effacement to suggest large territory recent infarction in the non motion distorted portion of the examination.  Clinical correlation and further evaluation with repeat attempts for imaging when patient better able to tolerate scanning advised. Electronically signed by: Maldonado Rodriguez DO Date:    12/16/2023 Time:    09:56 ]  X-Ray Chest 1 View S/P PICC Line by Nursing    Result Date: 12/19/2023  1. Satisfactory position of left-sided PICC. 2. Air-filled prominent bowel loops are partially seen in the field of view and may be dilated.  Dedicated abdominal radiograph should be considered for further evaluation. This report was flagged in Epic as  abnormal. Electronically signed by: Chrissy San MD Date:    12/19/2023 Time:    16:42

## 2023-12-20 NOTE — ASSESSMENT & PLAN NOTE
73M CKD3, HTN, DM2, COPD and a prior right thalamic infarct (2016) who is admitted to [facility] for post operative monitoring following a L1-L4 segmental posterolateral fusion, L2-L4 laminectomy and L2-L3 TLIF for L2-L4 discitis/osteomyelitis.     S/p IR biopsy of psoas abscess and L2-L3 disc space. Cultures no growth to date.    -Admitted to [facility]  -VS/Neuro checks q4h  -Completed MAP goals  -Continue Meropenem and Vancomycin per ID recs  -PICC consult placed for long-term antibiotic administration   -Daily CMP, Mag, Phos - replete electrolytes PRN  -PT/OT/SLP as appropriate  -Provigil daily to improve wakefulness  - Remains stable to step down to HM

## 2023-12-20 NOTE — PLAN OF CARE
23 1121   Post-Acute Status   Post-Acute Authorization Placement   Post-Acute Placement Status Referrals Sent   Discharge Delays None known at this time   Discharge Plan   Discharge Plan A Long-term acute care facility (LTAC)     Pt is requiring LTAC upon discharge. Pt anticipates 6-8 weeks of IV abx.  MARIANA faxed LTAC referral to LTAC facilities [facility] via [employer] for review to the followin.[facility] Phone: [phone]     2.[facility] Phone: [phone]     3.[facility] Phone: [phone]         SW will continue to follow patient.        Mirella Hayward LMSW  PRN - [facility]  [id number]

## 2023-12-20 NOTE — ASSESSMENT & PLAN NOTE
A 73M w/ hx CVA, CKD3, HTN, DM2, and COPD who presents with AMS likely 2/2 underling sepsis as well as progressive back pain and inability to ambulate for the past 2 weeks due to progressive L2-L3 osteodiscitis and presumed mechanical instability.          CTH 12/11: lacuanr infarcts   MRI w/wo L sp 12/11: severe central stenosis L4-5, discitis osteomyelitis at L2-3, L psoas abscess up to 2 cm  MRI C/T w/wo: very poor with alot of motion artifact, but no OM else where   US BLE 12/12: negative  12/10: , CRP >120  BCx NGTD  MRI Head, Csp, CTH 12/16: nondiagnostic  MRI C/T/L w/wo 12/16 without contribution to picture of fluctuating sensorium and motor exam; severe spondylosis in Csp with some STIR and contrast enhancement prevertebral and possibly early osteodiscitis. MRI Tsp without concerning findings and MRI Lsp with expected postop changes.      Now s/p L1-L4 decompression, L2-L3 TLIF and L1-L4 PSF on 12/15      POD#5    Subaxial cervical spondylosis which required PCF down the line  Encephalopathy is improving.  Okay to step-down back to hospital medicine from neurosurgical standpoint pending bed  Continue multimodal pain medication   HV and WV removed 12/20   LSO brace when OOB  Satting well  MAP >65  Advance diet as tolerated   Intraoperative cultures NGTD, continue abx per ID  DVT ppx   PT/OT and OOB   Rest of care per primary and NCC  Please notify neurosurgery on call for any acute neurologic change    Dispo: TTF to

## 2023-12-20 NOTE — SUBJECTIVE & OBJECTIVE
Scheduled Meds:   enoxparin  40 mg Subcutaneous Q24H (prophylaxis, 1700)    fluticasone furoate-vilanteroL  1 puff Inhalation Daily    gabapentin  200 mg Per NG tube TID    meropenem (MERREM) IVPB  2 g Intravenous Q8H    modafiniL  200 mg Per NG tube Before breakfast    mupirocin   Nasal BID    senna-docusate 8.6-50 mg  1 tablet Per NG tube Daily    sodium chloride 0.9%  10 mL Intravenous Q6H    vancomycin (VANCOCIN) IV (PEDS and ADULTS)  1,750 mg Intravenous Q24H     Continuous Infusions:  PRN Meds:acetaminophen, dextrose 10%, dextrose 10%, glucagon (human recombinant), insulin aspart U-100, magnesium oxide, magnesium oxide, morphine, ondansetron, potassium bicarbonate, potassium bicarbonate, potassium bicarbonate, potassium, sodium phosphates, potassium, sodium phosphates, potassium, sodium phosphates, Flushing PICC/Midline Protocol **AND** sodium chloride 0.9% **AND** sodium chloride 0.9%, Pharmacy to dose Vancomycin consult **AND** vancomycin - pharmacy to dose    Review of patient's allergies indicates:   Allergen Reactions    Tomato (solanum lycopersicum) Hives    Naproxen Hives    Shrimp Other (See Comments)        Past Medical History:   Diagnosis Date    Acute on chronic systolic heart failure 12/13/2023    Anemia 06/04/2021    Anticoagulant long-term use     Basal ganglia hemorrhage     Left basal ganglia hemorrhage with resultant right-sided hemiparesis which has resolved.     Benign hypertension with CKD (chronic kidney disease) stage III      Cataract     Chronic idiopathic gout of multiple sites     Chronic kidney disease, stage 3     COPD (chronic obstructive pulmonary disease)     Erectile dysfunction     Gout     Hemorrhoids without complication     Hyperlipidemia     Morbid obesity     Obstructive sleep apnea on CPAP     Reactive airway disease without complication 11/12/2021    Severe sepsis 12/11/2023    Stroke 2016, 2006    Thalamic infarct, acute (right) 01/2016    Type 2 DM with CKD stage 3  and hypertension     On pravastatin for cardiovascular protection.      Past Surgical History:   Procedure Laterality Date    CARPAL TUNNEL RELEASE Left 2/19/2020    Procedure: RELEASE, CARPAL TUNNEL LEFT;  Surgeon: Maria Luisa Mccurdy MD;  Location: [facility] OR;  Service: Orthopedics;  Laterality: Left;    COLONOSCOPY N/A 7/28/2023    Procedure: COLONOSCOPY;  Surgeon: Jaylene Alvarado MD;  Location: [facility] ENDO;  Service: Endoscopy;  Laterality: N/A;    EPIDURAL STEROID INJECTION N/A 5/2/2019    Procedure: Injection, Steroid, Epidural Cervical;  Surgeon: Dariel Doan Jr., MD;  Location: [facility];  Service: Pain Management;  Laterality: N/A;  Cervical Epidural Steroid Injection     [id number]    Arrive @ 1130; ASA; Check BG    EPIDURAL STEROID INJECTION Bilateral 5/29/2019    Procedure: Lumbar Medial Branch Blocks;  Surgeon: Dariel Doan Jr., MD;  Location: [facility];  Service: Pain Management;  Laterality: Bilateral;  Bilateral Lumbar Medial Branch Blocks L3, L4, L5    [id number]  [id number]    Attive @ 1030 (11 arrival request); NO Sedation; ASA; Check BG    EPIDURAL STEROID INJECTION Bilateral 7/3/2019    Procedure: Lumbar Medial Branch Blocks;  Surgeon: Dariel Doan Jr., MD;  Location: [facility];  Service: Pain Management;  Laterality: Bilateral;  Bilateral Lumbar Medial Branch Blocks L3, L4, L5    [id number]  [id number]    Arrive @ 0930; NO Sedation; ASA; Check BG    EPIDURAL STEROID INJECTION N/A 8/7/2019    Procedure: Injection, Steroid, Epidural Cervical;  Surgeon: Dariel Doan Jr., MD;  Location: [facility];  Service: Pain Management;  Laterality: N/A;  Cervical Epidural Steroid Injection C7-T1    22506    Arrive @ 1115; Last ASA 7/30; Check BG    ESOPHAGOGASTRODUODENOSCOPY N/A 7/28/2023    Procedure: EGD (ESOPHAGOGASTRODUODENOSCOPY);  Surgeon: Jaylene Alvarado MD;  Location: [facility];  Service: Endoscopy;  Laterality: N/A;  inst via portal    FUSION, SPINE, LUMBAR, TLIF, POSTERIOR APPROACH, USING PEDICLE  SCREW N/A 12/15/2023    Procedure: L-1 TO L-4 FUSION, SPINE, LUMBAR, TLIF, POSTERIOR APPROACH, USING PEDICLE SCREW;  Surgeon: Rolf Rincon MD;  [facility];  Service: Neurosurgery;  Laterality: N/A;    LEFT HEART CATHETERIZATION Left 9/21/2021    Procedure: Left heart cath 9am start, R rad access;  Surgeon: Dariel Conner MD;  Location: [facility] CATH LAB;  Service: Cardiology;  Laterality: Left;  RN PRE OP Covid NEGATIVE ON  9-20-21.  C A    MIDLINE CATHETER PLACEMENT  12/12/2023    NO PAST SURGERIES         Family History       Problem Relation (Age of Onset)    Diabetes Paternal Grandfather    Heart disease Paternal Grandfather    Hypertension     No Known Problems Mother, Father, Sister, Brother, Maternal Aunt, Maternal Uncle, Paternal Aunt, Paternal Uncle, Maternal Grandmother, Maternal Grandfather, Paternal Grandmother          Tobacco Use    Smoking status: Never    Smokeless tobacco: Never   Substance and Sexual Activity    Alcohol use: Yes     Alcohol/week: 0.0 standard drinks of alcohol     Comment: occacionally    Drug use: No    Sexual activity: Not Currently     Review of Systems   Skin:  Positive for wound.       Objective:     Vital Signs (Most Recent):  Temp: 98.9 °F (37.2 °C) (12/20/23 0701)  Pulse: 108 (12/20/23 1300)  Resp: (!) 22 (12/20/23 1300)  BP: 114/64 (12/20/23 1300)  SpO2: 100 % (12/20/23 1300) Vital Signs (24h Range):  Temp:  [98.6 °F (37 °C)-99.8 °F (37.7 °C)] 98.9 °F (37.2 °C)  Pulse:  [103-114] 108  Resp:  [19-26] 22  SpO2:  [91 %-100 %] 100 %  BP: (106-144)/(57-80) 114/64     Weight: 119.7 kg (264 lb)  Body mass index is 38.99 kg/m².     Physical Exam  Constitutional:       Appearance: Normal appearance.   Skin:     General: Skin is warm and dry.      Findings: Lesion present.   Neurological:      Mental Status: He is alert.          Laboratory:  All pertinent labs reviewed within the last 24 hours.    Diagnostic Results:  None

## 2023-12-20 NOTE — PROGRESS NOTES
[facility]  Infectious Disease  Progress Note    Patient Name: Bj Pate Jr.  MRN: [id number]  Admission Date: 12/10/2023  Length of Stay: 9 days  Attending Physician: Monica Jefferson MD  Primary Care Provider: Jose Antonio Foster MD    Isolation Status: No active isolations  Assessment/Plan:      ID  * Acute osteomyelitis of lumbar spine  74 yo male with PMH of CVA, CKD3, HTN, DM2, COPD admitted with increasing lethargy s/p fall found to have L psoas abscess/lumbar OM s/p IR aspiration of L2/3 disc space (cx ngtd) and washout with lumbar fusion/TLIF/laminectomy with NSGY on 12/15 - post-op notable for lethargy with transfer to ICU requiring intubation and pressors. Op note with purulent material, cx neg and path in process, so far no growth to date. Post-op MRI revealed stable lumbar spondylodiscitis and L psoas abscess, possible cervical spondylodiscitis and bibasilar consolidations.     WBC uptrending, but afebrile. Eval by neuro surgery this morning and HV and WV removed. Per nurse, wound did not appear infected. Unable to turn during exam for evaluation. Currently with loose stools, but on stool softener, so not suspecting cdiff.       Recommendations:  - continue empiric vancomycin. pharm to dose and meropenem. Anticipate 6-8 wks Iv abx.  - follow up path  - agree with US of extremities x 4                  Anticipated Disposition: tbd    Thank you for your consult. I will follow-up with patient. Please contact us if you have any additional questions.    Rowan Lau MD  Infectious Disease  [facility]    Subjective:     Principal Problem:Acute osteomyelitis of lumbar spine    HPI: Mr. Pate is a 74 yo male with PMH of CVA, CKD3, HTN, DM2, COPD who presents with increasing lethargy and confusion.     Pt remains altered during assessment, history gathered per family at bedside and chart review. Per chart review, pt reported ~3 weeks of low back pain, as  well as a fall leading up to presentation. Following the fall, pt reported lower extremity weakness and urinary retention. Daughter at bedside states that pt has not had any surgical procedures or injections recently, but was treated with pain management in 2019 and had lumbar and cervical injections. Pt is retired, but was a  previously. Family denied him having pets, denied adventurous hobbies, denied raw food intake. Daughter reported recent ER admission for abdominal pain. States pt required medication for a parasite that improved his symptoms. Upon chart review, appears pt presented in August with abdominal pain, CT AP was negative for acute findings. Pt was discharged and followed up with PCP shortly after and stated his symptoms had improved.     On admission, pt was febrile to 102, and tachycardic, with increasing leukocytosis, 13->16k, stable anemia, elevated sed/CRP: >120, 375, procal 1.37. blood cultures negative on admission. MRI lumbar spine noting discitis/osteomyelitis at L2-3 with left psoas abscess (2.2cm), as well as early osteo L3-4, noting difficult study and epidural abscess was difficult to exclude. CT head without evidence of superimposed acute intracranial process. MRI brain, cervical spine, and thoracic spine pending.     NSGY following and recommended IR consult for potential psoas abscess drainage and culture. Planning L2-pelvis decompression and fusion on 12/15/23.  IR planning CT guided aspiration/drainage of psoas abscess on either 12/12 or 12/13.     Pt is currently on vanc and cefepime. ID was consulted for antibiotic recs.       Interval History:     No acute events overnight. WBC uptrending. Pt complaining of diffuse pain. Per nurse, wound did not appear infected this morning. Has had small volume loose stools.    Review of Systems   Constitutional:  Negative for fever.   Respiratory:  Negative for shortness of breath.    Gastrointestinal:  Negative for abdominal  distention and abdominal pain.   Genitourinary:  Negative for dysuria.   Musculoskeletal:  Positive for arthralgias and myalgias.   Skin:  Positive for wound.   All other systems reviewed and are negative.    Objective:     Vital Signs (Most Recent):  Temp: 98.9 °F (37.2 °C) (12/20/23 0701)  Pulse: 108 (12/20/23 1300)  Resp: (!) 22 (12/20/23 1300)  BP: 114/64 (12/20/23 1300)  SpO2: 100 % (12/20/23 1300) Vital Signs (24h Range):  Temp:  [98.6 °F (37 °C)-99.8 °F (37.7 °C)] 98.9 °F (37.2 °C)  Pulse:  [103-114] 108  Resp:  [19-26] 22  SpO2:  [91 %-100 %] 100 %  BP: (106-144)/(57-80) 114/64     Weight: 119.7 kg (264 lb)  Body mass index is 38.99 kg/m².    Estimated Creatinine Clearance: 108.2 mL/min (based on SCr of 0.8 mg/dL).     Physical Exam  Constitutional:       General: He is not in acute distress.  HENT:      Head: Normocephalic and atraumatic.      Comments: NG tube in place  Pulmonary:      Effort: Pulmonary effort is normal. No respiratory distress.   Abdominal:      General: There is no distension.      Palpations: Abdomen is soft.      Tenderness: There is no abdominal tenderness.   Genitourinary:     Comments: bryant  Musculoskeletal:      Right lower leg: No edema.      Left lower leg: No edema.      Comments: RUE> LUE edematous  Pain with movement and palpation of RUE   Skin:     General: Skin is warm and dry.          Significant Labs:   Microbiology Results (last 7 days)       Procedure Component Value Units Date/Time    Fungus culture [[id number]] Collected: 12/13/23 1703    Order Status: Completed Specimen: Abscess from Buttocks, Left Updated: 12/20/23 1312     Fungus (Mycology) Culture Culture in progress    Culture, Anaerobe [[id number]] Collected: 12/15/23 1657    Order Status: Completed Specimen: Wound from Back Updated: 12/19/23 0852     Anaerobic Culture Culture in progress    Narrative:      3.) 2,3 Disc space    Culture, Anaerobe [[id number]] Collected: 12/15/23 2277    Order Status: Completed  Specimen: Wound from Back Updated: 12/19/23 0851     Anaerobic Culture Culture in progress    Narrative:      2.) 2,3 Disc space    Culture, Anaerobe [[id number]] Collected: 12/15/23 1603    Order Status: Completed Specimen: Wound from Back Updated: 12/19/23 0850     Anaerobic Culture Culture in progress    Narrative:      Paraspinal    AFB Culture & Smear [[id number]] Collected: 12/15/23 1655    Order Status: Completed Specimen: Wound from Back Updated: 12/18/23 1436     AFB Culture & Smear Culture in progress     AFB CULTURE STAIN No acid fast bacilli seen.    Narrative:      2.) 2,3 Disc space    AFB Culture & Smear [[id number]] Collected: 12/15/23 1657    Order Status: Completed Specimen: Wound from Back Updated: 12/18/23 1436     AFB Culture & Smear Culture in progress     AFB CULTURE STAIN No acid fast bacilli seen.    Narrative:      3.) 2,3 Disc space    AFB Culture & Smear [[id number]] Collected: 12/15/23 1603    Order Status: Completed Specimen: Wound from Back Updated: 12/18/23 1436     AFB Culture & Smear Culture in progress     AFB CULTURE STAIN No acid fast bacilli seen.    Narrative:      Paraspinal    Culture, Anaerobic [[id number]] Collected: 12/13/23 1703    Order Status: Completed Specimen: Abscess from Buttocks, Left Updated: 12/18/23 1026     Anaerobic Culture Culture in progress    Aerobic culture [[id number]] Collected: 12/15/23 1655    Order Status: Completed Specimen: Wound from Back Updated: 12/18/23 0915     Aerobic Bacterial Culture No growth    Narrative:      2.) 2,3 Disc space    Aerobic culture [[id number]] Collected: 12/15/23 1603    Order Status: Completed Specimen: Wound from Back Updated: 12/18/23 0915     Aerobic Bacterial Culture No growth    Narrative:      Paraspinal    Aerobic culture [[id number]] Collected: 12/15/23 1657    Order Status: Completed Specimen: Wound from Back Updated: 12/18/23 0913     Aerobic Bacterial Culture No growth    Narrative:      3.) 2,3 Disc space     Blood culture [[id number]] Collected: 12/12/23 0033    Order Status: Completed Specimen: Blood Updated: 12/17/23 0612     Blood Culture, Routine No growth after 5 days.    Blood culture [[id number]] Collected: 12/12/23 0033    Order Status: Completed Specimen: Blood Updated: 12/17/23 0612     Blood Culture, Routine No growth after 5 days.    Aerobic culture [[id number]] Collected: 12/13/23 1703    Order Status: Completed Specimen: Abscess from Buttocks, Left Updated: 12/16/23 1109     Aerobic Bacterial Culture No growth    Blood culture x two cultures. Draw prior to antibiotics. [[id number]] Collected: 12/10/23 2007    Order Status: Completed Specimen: Blood from Peripheral, Antecubital, Left Updated: 12/15/23 2212     Blood Culture, Routine No growth after 5 days.    Narrative:      Aerobic and anaerobic    Blood culture x two cultures. Draw prior to antibiotics. [[id number]] Collected: 12/10/23 2007    Order Status: Completed Specimen: Blood from Peripheral, Hand, Left Updated: 12/15/23 2212     Blood Culture, Routine No growth after 5 days.    Narrative:      Aerobic and anaerobic    Gram stain [[id number]] Collected: 12/15/23 1657    Order Status: Completed Specimen: Wound from Back Updated: 12/15/23 1900     Gram Stain Result No WBC's      No organisms seen    Narrative:      3.) 2,3 Disc space    Gram stain [[id number]] Collected: 12/15/23 1655    Order Status: Completed Specimen: Wound from Back Updated: 12/15/23 1857     Gram Stain Result No WBC's      No organisms seen    Narrative:      2.) 2,3 Disc space    Gram stain [[id number]] Collected: 12/15/23 1603    Order Status: Completed Specimen: Wound from Back Updated: 12/15/23 1855     Gram Stain Result No WBC's      No organisms seen    Narrative:      Paraspinal    Fungus culture [[id number]] Collected: 12/15/23 1655    Order Status: Sent Specimen: Wound from Back Updated: 12/15/23 1730    Fungus culture [[id number]] Collected: 12/15/23 1657    Order  Status: Sent Specimen: Wound from Back Updated: 12/15/23 1727    Fungus culture [[id number]] Collected: 12/15/23 1603    Order Status: Sent Specimen: Wound from Back Updated: 12/15/23 1621    Culture, Anaerobe [[id number]]     Order Status: No result Specimen: Bone from Back     Aerobic culture [[id number]]     Order Status: No result Specimen: Bone from Back     AFB Culture & Smear [[id number]]     Order Status: No result Specimen: Bone from Back     Gram stain [[id number]]     Order Status: No result Specimen: Bone from Back     Fungus culture [[id number]]     Order Status: No result Specimen: Bone from Back     AFB Culture & Smear [[id number]] Collected: 12/13/23 1703    Order Status: Completed Specimen: Abscess from Buttocks, Left Updated: 12/14/23 2128     AFB Culture & Smear Culture in progress     AFB CULTURE STAIN No acid fast bacilli seen.    Gram stain [[id number]] Collected: 12/13/23 1703    Order Status: Completed Specimen: Abscess from Buttocks, Left Updated: 12/13/23 2104     Gram Stain Result No WBC's      No organisms seen          Pathology Results  (Last 10 years)                 07/28/23 1133  Specimen to Pathology, Surgery Gastrointestinal tract Final result    Narrative:  Pre-op Diagnosis: Diarrhea, unspecified type [R19.7]   Abnormal finding on GI tract imaging [R93.3]   Procedure(s):   EGD (ESOPHAGOGASTRODUODENOSCOPY)   COLONOSCOPY   Jar #1: Transverse colon polyp x1   Which provider would you like to cc?->HELEN AWAD   Release to patient->Immediate   Specimen total (fresh, frozen, permanent):->1               Significant Imaging: I have reviewed all pertinent imaging results/findings within the past 24 hours.

## 2023-12-20 NOTE — HPI
Bj Pate is a 73 year old male with a h/o CVA  about 8 to 10 years ago with R sided deficits, DM, HTN who presented from home with family with 3 weeks of abdominal pain. Seen at multiple E[facility], and generally unremarkable work up as per family. Within last few days started to have progressive worsening back pain. Became acute altered 2 days ago w/, LE weakness now requiring assistance to walk, as well as urinary incontinence. Patient is A/O 0x at admit. Accompanied by Daughter, most of history from her.      Admission to ED patient was febrile at 102 F, tachycardic 130, tachypneic at 23, hypertensive at 140/72 initially satting well on room air later requiring 2 L nasal cannula.  WBCs elevated 12.74 anemia 11.0 platelets normal, sed rate 120+, BUN 27 magnesium 1.5, albumin 2.4, .1, troponin 0.126 urine trace proteinuria trace ketones lactate initially elevated 2.6 later 1.3 CT head unremarkable for acute processes CT abdomen and pelvis and MRI lumbar spine shows left psoas abscess L2-L3 osteomyleitis, and diskitis.  Neurosurgery was consulted. Patient admitted to hospital medicine service for further management. Skin integrity KEV consulted for evaluation of skin injury.

## 2023-12-20 NOTE — PT/OT/SLP PROGRESS
Occupational Therapy   Co-Treatment w/ PT     Name: Bj Pate Jr.  MRN: [id number]  Admitting Diagnosis:  Acute osteomyelitis of lumbar spine  5 Days Post-Op    Recommendations:     Discharge Recommendations: Moderate Intensity Therapy  Discharge Equipment Recommendations:  lift device, hospital bed, wheelchair  Barriers to discharge:  None    Assessment:     Bj Pate Jr. is a 73 y.o. male with a medical diagnosis of Acute osteomyelitis of lumbar spine.  He presents with decrease functional status secondary to medical diagnosis. Performance deficits affecting function are weakness, impaired endurance, impaired self care skills, impaired functional mobility, gait instability, impaired balance, impaired cognition, decreased coordination, decreased upper extremity function, decreased lower extremity function, decreased safety awareness, pain, impaired fine motor, impaired coordination, decreased ROM. Patient LSO brace unable to be utilized due to inappropriate fit, MD informed. Patient with poor tolerance to therapy this date due to decreased activity tolerance, minimal eye opening, pain, and decreased cognition. Patient is currently dependent x2 for all mobility requiring extreme encouragement for minimal participation at this time. In addition, patient BUE presenting with increased edema and pain to touch at this time additionally limiting patient movement. MD/RN notified of patient current status.     Rehab Prognosis:  Fair; patient would benefit from acute skilled OT services to address these deficits and reach maximum level of function.       Plan:     Patient to be seen 3 x/week to address the above listed problems via self-care/home management, therapeutic activities, neuromuscular re-education, therapeutic exercises  Plan of Care Expires: 01/19/24  Plan of Care Reviewed with: patient    Subjective     Chief Complaint: None verbalized   Patient/Family Comments/goals: None stated at this  time  Pain/Comfort:  Location - Side 1: Bilateral  Location - Orientation 1: generalized  Location 1: arm  Pain Addressed 1: Reposition, Distraction  Pain Rating Post-Intervention 1: 0/10    Objective:     Communicated with: RN prior to session.  Patient found supine with blood pressure cuff, NG tube, pulse ox (continuous), telemetry, Condom Catheter upon OT entry to room.    General Precautions: Standard, aspiration, fall    Orthopedic Precautions:spinal precautions  Braces: LSO  Respiratory Status: Room air     Occupational Performance:     Bed Mobility:    Patient completed Rolling/Turning to Left with  dependent and 2 persons  Patient completed Rolling/Turning to Right with dependent and 2 persons  Patient completed Scooting/Bridging with dependent and 2 persons  Patient completed Supine to Sit with dependent and 2 persons  Patient completed Sit to Supine with dependent and 2 persons     Functional Mobility/Transfers:  Not completed at this time    Activities of Daily Living:  Toileting: dependence; patient soiled during PT/OT session; hygiene completed via roll method     [facility] 6 Click ADL: 6    Treatment & Education:  Attempted ROM, attention, and tracking exercises during therapy session with minimal-no success. Patient extremely lethargic during session limiting patient participation. OT to continue to attempt.     Patient left HOB elevated with all lines intact, call button in reach, and RN notified    GOALS:   Multidisciplinary Problems       Occupational Therapy Goals          Problem: Occupational Therapy    Goal Priority Disciplines Outcome Interventions   Occupational Therapy Goal     OT, PT/OT Ongoing, Progressing    Description: Goals to be met by: 1/19/24     Patient will increase functional independence with ADLs by performing:    UE Dressing with Moderate Assistance.  LE Dressing with Maximum Assistance.  Grooming while seated with Moderate Assistance.  Toileting from bedside commode with Maximum  Assistance for hygiene and clothing management.   Step transfer with Moderate Assistance  Toilet transfer to bedside commode with Moderate Assistance.                         Time Tracking:     OT Date of Treatment: 12/20/23  OT Start Time: 0949  OT Stop Time: 1030  OT Total Time (min): 41 min    Billable Minutes:Self Care/Home Management 25 minutes   Therapeutic Activity 16 minutes     OT/АЛЕКСАНДР: OT          12/20/2023

## 2023-12-20 NOTE — PT/OT/SLP PROGRESS
Speech Language Pathology Treatment    Patient Name:  Bj Pate Jr.   MRN:  [id number]  Admitting Diagnosis: Acute osteomyelitis of lumbar spine    Recommendations:                 General Recommendations:  Dysphagia therapy and Cognitive-linguistic evaluation  Diet recommendations:  NPO, Liquid Diet Level: NPO   Aspiration Precautions: Frequent oral care and Strict aspiration precautions   General Precautions: Standard, aspiration, fall  Communication strategies:  provide increased time to answer and go to room if call light pushed    Assessment:     Bj Pate Jr. is a 73 y.o. male with an SLP diagnosis of Dysphagia.  Patient with minimal sustained alertness upon SLP attempt. Pt would benefit from ongoing swallow assessment to determine safest, least restrictive means nutrition/hydration.     Subjective     SLP reviewed Pt with RN pre/post attempt  Pt presents lethargic  Pt with minimal verbal expression elicited though with intermittent facial expressions (smiles) noted    Pain/Comfort:   Patient did not rate pre/post attempt, RN aware     Respiratory Status: Room air    Objective:     Has the patient been evaluated by SLP for swallowing?   Yes  Keep patient NPO? Yes   Current Respiratory Status:   Room air     Pt found upright, asleep in bed with NG in place.  He woke per mod-max verbal and tactile cues. HOB elevated per RN clearance. He easily transitioned to sleep state despite consistent verbal cues. SLP unable to elicit vocalization via automatic or structured speech tasks. Pt with breathy vocal quality upon minimal spontaneous speech elicited. SLP unable to elicit response to yes/no questions or elicit commands. PO trials deferred due to persistent lethargy, decreased command following and aspiration risk. SLP educated Pt on SLP role and need for ongoing assessment of swallow function as feasible, and that SLP would re-attempt later service day. Pt did not demonstrate or verbalize understanding. No  additional questions .RN at bedside at end of session.   Upon later attempt, Patient unavailable (testing.)     Goals:   Multidisciplinary Problems       SLP Goals          Problem: SLP    Goal Priority Disciplines Outcome   SLP Goal     SLP Ongoing, Progressing   Description: Speech Language Pathology Goals  Goals expected to be met by 12/25/23    1. Pt will tolerate thin liquids w/o overt S/S aspiration, MIN A  2. Pt will participate in ongoing assessment of swallow function to determine safest, least restrictive means of nutrition/hydration  3. Pt will participate in further assessment of cognitive-linguistic skills to determine ongoing POC needs  4. Educate Pt and family on aspiration precautions and SLP POC                         Plan:     Patient to be seen:  4 x/week   Plan of Care expires:  01/17/24  Plan of Care reviewed with:  patient   SLP Follow-Up:  Yes       Discharge recommendations:   (tbd)   Barriers to Discharge:   NPO    Time Tracking:     SLP Treatment Date:   12/20/23  Speech Start Time:  1237  Speech Stop Time:  1248     Speech Total Time (min):  11 min    Billable Minutes: Treatment Swallowing Dysfunction 8    12/20/2023

## 2023-12-20 NOTE — SUBJECTIVE & OBJECTIVE
Interval History:  see hospital course above    Review of Systems   Constitutional:  Negative for chills and fever.   HENT:  Negative for trouble swallowing.    Cardiovascular:  Negative for chest pain.   Gastrointestinal:  Negative for nausea and vomiting.   Neurological:  Positive for weakness. Negative for facial asymmetry, speech difficulty and headaches.   Psychiatric/Behavioral:  Positive for confusion.      Objective:     Vitals:  Temp: 98.3 °F (36.8 °C)  Pulse: (!) 114  Rhythm: sinus tachycardia  BP: 131/74  MAP (mmHg): 80  Resp: 19  SpO2: 98 %    Temp  Min: 98.3 °F (36.8 °C)  Max: 99.8 °F (37.7 °C)  Pulse  Min: 104  Max: 114  BP  Min: 106/60  Max: 144/80  MAP (mmHg)  Min: 77  Max: 105  Resp  Min: 19  Max: 26  SpO2  Min: 91 %  Max: 100 %  Oxygen Concentration (%)  Min: 21  Max: 21    12/19 0701 - 12/20 0700  In: 2644.7   Out: 1174 [Urine:1110; Drains:64]   Unmeasured Output  Urine Occurrence: 1  Stool Occurrence: 1        Physical Exam  Vitals and nursing note reviewed.   Constitutional:       General: He is not in acute distress.     Appearance: Normal appearance. He is obese.      Comments: Well developed. Obese. Resting comfortably in bed.   HENT:      Head: Normocephalic and atraumatic.      Right Ear: External ear normal.      Left Ear: External ear normal.      Nose: Nose normal.      Mouth/Throat:      Mouth: Mucous membranes are moist.      Pharynx: Oropharynx is clear.   Eyes:      General: No scleral icterus.     Extraocular Movements: Extraocular movements intact.      Pupils: Pupils are equal, round, and reactive to light.   Cardiovascular:      Rate and Rhythm: Normal rate and regular rhythm.   Pulmonary:      Effort: Pulmonary effort is normal. No respiratory distress.   Abdominal:      General: There is no distension.      Comments: Obese abdomen.   Musculoskeletal:      Comments: BUE pain and swelling, particularly in the hands.   Skin:     General: Skin is warm and dry.   Neurological:       Mental Status: He is alert.      Comments: [id number].  Exam somewhat limited secondary to effort. Awake, alert, and oriented to person only. . Disoriented to place, time, and situation. Speech fluent. Follows commands with minimal delay.    PERRL. EOMI. No facial asymmetry. Conversational hearing intact.   Moves all extremities spontaneously. Sensation intact to noxious stimuli in all 4 extremities. Exam somewhat limit due to pain and swelling in extremities.       Gait & coordination exams deferred.        Medications:  Continuous Scheduledenoxparin, 40 mg, Q24H (prophylaxis, 1700)  fluticasone furoate-vilanteroL, 1 puff, Daily  gabapentin, 200 mg, TID  meropenem (MERREM) IVPB, 2 g, Q8H  modafiniL, 200 mg, Before breakfast  mupirocin, , BID  psyllium husk (aspartame), 1 packet, BID  sodium chloride 0.9%, 10 mL, Q6H    PRNacetaminophen, 650 mg, Q6H PRN  dextrose 10%, 12.5 g, PRN  dextrose 10%, 25 g, PRN  glucagon (human recombinant), 1 mg, PRN  insulin aspart U-100, 0-10 Units, QID (AC + HS) PRN  magnesium oxide, 800 mg, PRN  magnesium oxide, 800 mg, PRN  morphine, 2 mg, Q4H PRN  ondansetron, 4 mg, Q8H PRN  potassium bicarbonate, 35 mEq, PRN  potassium bicarbonate, 50 mEq, PRN  potassium bicarbonate, 60 mEq, PRN  potassium, sodium phosphates, 2 packet, PRN  potassium, sodium phosphates, 2 packet, PRN  potassium, sodium phosphates, 2 packet, PRN  sodium chloride 0.9%, 10 mL, PRN  vancomycin - pharmacy to dose, , pharmacy to manage frequency      Today I personally reviewed pertinent medications, lines/drains/airways, imaging, laboratory results, notably:    Laboratory:  CBC:  Recent Labs   Lab 12/20/23  0019   WBC 21.56*   RBC 2.62*   HGB 7.8*   HCT 24.3*      MCV 93   MCH 29.8   MCHC 32.1       CMP:  Recent Labs   Lab 12/20/23  0019   CALCIUM 8.8   ALBUMIN 1.5*   PROT 6.5      K 3.9   CO2 26      BUN 41*   CREATININE 0.8   ALKPHOS 125   ALT 48*   AST 73*   BILITOT 0.5     Recent Labs   Lab  12/20/23  0019   MG 1.9   PHOS 1.7*           Diet  No diet orders on file  No diet orders on file

## 2023-12-20 NOTE — SUBJECTIVE & OBJECTIVE
Interval History:     No acute events overnight. WBC uptrending. Pt complaining of diffuse pain. Per nurse, wound did not appear infected this morning. Has had small volume loose stools.    Review of Systems   Constitutional:  Negative for fever.   Respiratory:  Negative for shortness of breath.    Gastrointestinal:  Negative for abdominal distention and abdominal pain.   Genitourinary:  Negative for dysuria.   Musculoskeletal:  Positive for arthralgias and myalgias.   Skin:  Positive for wound.   All other systems reviewed and are negative.    Objective:     Vital Signs (Most Recent):  Temp: 98.9 °F (37.2 °C) (12/20/23 0701)  Pulse: 108 (12/20/23 1300)  Resp: (!) 22 (12/20/23 1300)  BP: 114/64 (12/20/23 1300)  SpO2: 100 % (12/20/23 1300) Vital Signs (24h Range):  Temp:  [98.6 °F (37 °C)-99.8 °F (37.7 °C)] 98.9 °F (37.2 °C)  Pulse:  [103-114] 108  Resp:  [19-26] 22  SpO2:  [91 %-100 %] 100 %  BP: (106-144)/(57-80) 114/64     Weight: 119.7 kg (264 lb)  Body mass index is 38.99 kg/m².    Estimated Creatinine Clearance: 108.2 mL/min (based on SCr of 0.8 mg/dL).     Physical Exam  Constitutional:       General: He is not in acute distress.  HENT:      Head: Normocephalic and atraumatic.      Comments: NG tube in place  Pulmonary:      Effort: Pulmonary effort is normal. No respiratory distress.   Abdominal:      General: There is no distension.      Palpations: Abdomen is soft.      Tenderness: There is no abdominal tenderness.   Genitourinary:     Comments: bryant  Musculoskeletal:      Right lower leg: No edema.      Left lower leg: No edema.      Comments: RUE> LUE edematous  Pain with movement and palpation of RUE   Skin:     General: Skin is warm and dry.          Significant Labs:   Microbiology Results (last 7 days)       Procedure Component Value Units Date/Time    Fungus culture [[id number]] Collected: 12/13/23 9990    Order Status: Completed Specimen: Abscess from Buttocks, Left Updated: 12/20/23 1311      Fungus (Mycology) Culture Culture in progress    Culture, Anaerobe [[id number]] Collected: 12/15/23 1657    Order Status: Completed Specimen: Wound from Back Updated: 12/19/23 0852     Anaerobic Culture Culture in progress    Narrative:      3.) 2,3 Disc space    Culture, Anaerobe [[id number]] Collected: 12/15/23 1655    Order Status: Completed Specimen: Wound from Back Updated: 12/19/23 0851     Anaerobic Culture Culture in progress    Narrative:      2.) 2,3 Disc space    Culture, Anaerobe [[id number]] Collected: 12/15/23 1603    Order Status: Completed Specimen: Wound from Back Updated: 12/19/23 0850     Anaerobic Culture Culture in progress    Narrative:      Paraspinal    AFB Culture & Smear [[id number]] Collected: 12/15/23 1655    Order Status: Completed Specimen: Wound from Back Updated: 12/18/23 1436     AFB Culture & Smear Culture in progress     AFB CULTURE STAIN No acid fast bacilli seen.    Narrative:      2.) 2,3 Disc space    AFB Culture & Smear [[id number]] Collected: 12/15/23 1657    Order Status: Completed Specimen: Wound from Back Updated: 12/18/23 1436     AFB Culture & Smear Culture in progress     AFB CULTURE STAIN No acid fast bacilli seen.    Narrative:      3.) 2,3 Disc space    AFB Culture & Smear [[id number]] Collected: 12/15/23 1603    Order Status: Completed Specimen: Wound from Back Updated: 12/18/23 1436     AFB Culture & Smear Culture in progress     AFB CULTURE STAIN No acid fast bacilli seen.    Narrative:      Paraspinal    Culture, Anaerobic [[id number]] Collected: 12/13/23 1703    Order Status: Completed Specimen: Abscess from Buttocks, Left Updated: 12/18/23 1026     Anaerobic Culture Culture in progress    Aerobic culture [[id number]] Collected: 12/15/23 1655    Order Status: Completed Specimen: Wound from Back Updated: 12/18/23 0915     Aerobic Bacterial Culture No growth    Narrative:      2.) 2,3 Disc space    Aerobic culture [[id number]] Collected: 12/15/23 1603    Order  Status: Completed Specimen: Wound from Back Updated: 12/18/23 0915     Aerobic Bacterial Culture No growth    Narrative:      Paraspinal    Aerobic culture [[id number]] Collected: 12/15/23 1657    Order Status: Completed Specimen: Wound from Back Updated: 12/18/23 0913     Aerobic Bacterial Culture No growth    Narrative:      3.) 2,3 Disc space    Blood culture [[id number]] Collected: 12/12/23 0033    Order Status: Completed Specimen: Blood Updated: 12/17/23 0612     Blood Culture, Routine No growth after 5 days.    Blood culture [[id number]] Collected: 12/12/23 0033    Order Status: Completed Specimen: Blood Updated: 12/17/23 0612     Blood Culture, Routine No growth after 5 days.    Aerobic culture [[id number]] Collected: 12/13/23 1703    Order Status: Completed Specimen: Abscess from Buttocks, Left Updated: 12/16/23 1109     Aerobic Bacterial Culture No growth    Blood culture x two cultures. Draw prior to antibiotics. [[id number]] Collected: 12/10/23 2007    Order Status: Completed Specimen: Blood from Peripheral, Antecubital, Left Updated: 12/15/23 2212     Blood Culture, Routine No growth after 5 days.    Narrative:      Aerobic and anaerobic    Blood culture x two cultures. Draw prior to antibiotics. [[id number]] Collected: 12/10/23 2007    Order Status: Completed Specimen: Blood from Peripheral, Hand, Left Updated: 12/15/23 2212     Blood Culture, Routine No growth after 5 days.    Narrative:      Aerobic and anaerobic    Gram stain [[id number]] Collected: 12/15/23 1657    Order Status: Completed Specimen: Wound from Back Updated: 12/15/23 1900     Gram Stain Result No WBC's      No organisms seen    Narrative:      3.) 2,3 Disc space    Gram stain [[id number]] Collected: 12/15/23 1655    Order Status: Completed Specimen: Wound from Back Updated: 12/15/23 1857     Gram Stain Result No WBC's      No organisms seen    Narrative:      2.) 2,3 Disc space    Gram stain [[id number]] Collected: 12/15/23 1603     Order Status: Completed Specimen: Wound from Back Updated: 12/15/23 1855     Gram Stain Result No WBC's      No organisms seen    Narrative:      Paraspinal    Fungus culture [[id number]] Collected: 12/15/23 1655    Order Status: Sent Specimen: Wound from Back Updated: 12/15/23 1730    Fungus culture [[id number]] Collected: 12/15/23 1657    Order Status: Sent Specimen: Wound from Back Updated: 12/15/23 1727    Fungus culture [[id number]] Collected: 12/15/23 1603    Order Status: Sent Specimen: Wound from Back Updated: 12/15/23 1621    Culture, Anaerobe [[id number]]     Order Status: No result Specimen: Bone from Back     Aerobic culture [[id number]]     Order Status: No result Specimen: Bone from Back     AFB Culture & Smear [[id number]]     Order Status: No result Specimen: Bone from Back     Gram stain [[id number]]     Order Status: No result Specimen: Bone from Back     Fungus culture [[id number]]     Order Status: No result Specimen: Bone from Back     AFB Culture & Smear [[id number]] Collected: 12/13/23 1703    Order Status: Completed Specimen: Abscess from Buttocks, Left Updated: 12/14/23 2128     AFB Culture & Smear Culture in progress     AFB CULTURE STAIN No acid fast bacilli seen.    Gram stain [[id number]] Collected: 12/13/23 1703    Order Status: Completed Specimen: Abscess from Buttocks, Left Updated: 12/13/23 2104     Gram Stain Result No WBC's      No organisms seen          Pathology Results  (Last 10 years)                 07/28/23 1133  Specimen to Pathology, Surgery Gastrointestinal tract Final result    Narrative:  Pre-op Diagnosis: Diarrhea, unspecified type [R19.7]   Abnormal finding on GI tract imaging [R93.3]   Procedure(s):   EGD (ESOPHAGOGASTRODUODENOSCOPY)   COLONOSCOPY   Jar #1: Transverse colon polyp x1   Which provider would you like to cc?->HELEN AWAD   Release to patient->Immediate   Specimen total (fresh, frozen, permanent):->1               Significant Imaging: I have  reviewed all pertinent imaging results/findings within the past 24 hours.

## 2023-12-20 NOTE — PROGRESS NOTES
[facility]  Neurosurgery  Progress Note    Subjective:     History of Present Illness: Mr. Pate is a 73M w/ hx CVA, CKD3, HTN, DM2, COPD who presents with increasing lethargy and confusion. Per ED team, patient had 3 weeks of low back pain, with a fall yesterday leading to his presentation. He had subjective lower extremity weakness and urinary retention after the fall. Septic on presentation to ED. Too altered to meaningfully participate in spine motor exam at time of NSGY evaluation.     Post-Op Info:  Procedure(s) (LRB):  L-1 TO L-4 FUSION, SPINE, LUMBAR, TLIF, POSTERIOR APPROACH, USING PEDICLE SCREW (N/A)   5 Days Post-Op   Interval History:    12/20: NAEON. AF, 103-114 HR. FC BUE L>R and BLE wiggling toes. Wound vac and HV x2 removed today. Okay for stepdown to HM      Medications:  Continuous Infusions:    Scheduled Meds:   acetaminophen  1,000 mg Per NG tube Q8H    fentaNYL  50 mcg Intravenous Once    fluticasone furoate-vilanteroL  1 puff Inhalation Daily    gabapentin  200 mg Per NG tube TID    meropenem (MERREM) IVPB  2 g Intravenous Q8H    modafiniL  200 mg Per NG tube Before breakfast    mupirocin   Nasal BID    polyethylene glycol  17 g Per NG tube BID    senna-docusate 8.6-50 mg  1 tablet Per NG tube Daily    vancomycin (VANCOCIN) IV (PEDS and ADULTS)  1,750 mg Intravenous Q24H     PRN Meds:aluminum-magnesium hydroxide-simethicone, dextrose 10%, dextrose 10%, diphenhydrAMINE, glucagon (human recombinant), glucose, glucose, insulin aspart U-100, levalbuterol, magnesium oxide, magnesium oxide, morphine, naloxone, ondansetron, potassium bicarbonate, potassium bicarbonate, potassium bicarbonate, potassium, sodium phosphates, potassium, sodium phosphates, potassium, sodium phosphates, sodium chloride 0.9%, sodium chloride 0.9%, Pharmacy to dose Vancomycin consult **AND** vancomycin - pharmacy to dose     Review of Systems  Objective:     Weight: 119.7 kg (264 lb)  Body mass index is  38.99 kg/m².  Vital Signs (Most Recent):  Temp: 97.8 °F (36.6 °C) (12/18/23 0730)  Pulse: (!) 111 (12/18/23 0905)  Resp: (!) 23 (12/18/23 0905)  BP: (!) 110/56 (12/18/23 0905)  SpO2: 98 % (12/18/23 0905) Vital Signs (24h Range):  Temp:  [97.8 °F (36.6 °C)-98.6 °F (37 °C)] 97.8 °F (36.6 °C)  Pulse:  [] 111  Resp:  [15-25] 23  SpO2:  [96 %-100 %] 98 %  BP: ()/(51-95) 110/56  Arterial Line BP: ()/() 88/82     Date 12/18/23 0700 - 12/19/23 0659   Shift 2697-7305 0058-4607 2249-4564 24 Hour Total   INTAKE   I.V.(mL/kg) 46.1(0.4)   46.1(0.4)   Shift Total(mL/kg) 46.1(0.4)   46.1(0.4)   OUTPUT   Urine(mL/kg/hr) 275   275   Shift Total(mL/kg) 275(2.3)   275(2.3)   Weight (kg) 119.7 119.7 119.7 119.7                Vent Mode: Spont  Oxygen Concentration (%):  [40] 40  Resp Rate Total:  [17 br/min-19 br/min] 17 br/min  Vt Set:  [480 mL] 480 mL  PEEP/CPAP:  [5 cmH20] 5 cmH20  Pressure Support:  [5 cmH20] 5 cmH20  Mean Airway Pressure:  [7.5 cmH20-9.3 cmH20] 7.5 cmH20             Closed/Suction Drain 12/15/23 1721 Tube - 1 Right Back Accordion 10 Fr. (Active)   Dressing Status Clean;Dry;Intact 12/16/23 1101   Dressing Intervention Integrity maintained 12/16/23 1101   Drainage Sanguineous;Bright red;Bloody 12/16/23 1101   Status Other (Comment) 12/16/23 1101   Output (mL) 160 mL 12/16/23 0528            Closed/Suction Drain 12/15/23 1722 Tube - 2 Left Back Accordion 10 Fr. (Active)   Dressing Status Clean;Dry;Intact 12/16/23 1101   Dressing Intervention Integrity maintained 12/16/23 1101   Drainage Sanguineous;Bright red;Bloody 12/16/23 1101   Status Other (Comment) 12/16/23 1101   Output (mL) 70 mL 12/16/23 0528            NG/OG Tube 12/14/23 0800 nasogastric Left nostril (Active)   Placement Check placement verified by x-ray 12/16/23 1101   Tolerance no signs/symptoms of discomfort 12/16/23 1101   Securement secured to nostril center w/ adhesive device 12/16/23 1101   Clamp Status/Tolerance clamped  "12/16/23 1101   Suction Setting/Drainage Method suction at the bedside 12/16/23 1101   Insertion Site Appearance no redness, warmth, tenderness, skin breakdown, drainage 12/16/23 1101   Drainage None 12/16/23 1101   Flush/Irrigation flushed w/;water;no resistance met 12/16/23 1101   Feeding Type continuous 12/14/23 1226   Feeding Action feeding held 12/16/23 1101   Current Rate (mL/hr) 10 mL/hr 12/14/23 1226   Intake (mL) 50 mL 12/15/23 0006   Water Bolus (mL) 250 mL 12/14/23 2026   Tube Output(mL)(Include Discarded Residual) 0 mL 12/15/23 0006   Intake (mL) - Formula Tube Feeding 15 12/14/23 2026            Urethral Catheter 12/15/23 1636 Straight-tip;Silicone 16 Fr. (Active)   Site Assessment Clean;Intact 12/16/23 1101   Collection Container Standard drainage bag 12/16/23 1101   Securement Method secured to top of thigh w/ adhesive device 12/16/23 1101   Catheter Care Performed yes 12/16/23 1101   Reason for Continuing Urinary Catheterization Post operative 12/16/23 1101   CAUTI Prevention Bundle Securement Device in place with 1" slack;Intact seal between catheter & drainage tubing;Drainage bag/urimeter off the floor;Sheeting clip in use;No dependent loops or kinks;Drainage bag/urimeter not overfilled (<2/3 full);Drainage bag/urimeter below bladder 12/16/23 1101   Output (mL) 125 mL 12/16/23 1101          Physical Exam       [id number]  OX1  Following commands   BUE 2/5 L>R  BLE wiggles toes  HV and WVAC removed, sites c/d/i    Significant Labs:  Recent Labs   Lab 12/17/23  0335 12/18/23  0113   * 108   * 136   K 4.2 4.2    104   CO2 24 25   BUN 43* 38*   CREATININE 1.1 0.9   CALCIUM 9.1 8.8   MG 2.0 1.9       Recent Labs   Lab 12/17/23  0335 12/17/23  0415 12/18/23  0113   WBC 20.15*  --  16.97*   HGB 8.2*  --  7.9*   HCT 24.2* 25* 24.2*     --  333       No results for input(s): "LABPT", "INR", "APTT" in the last 48 hours.    Microbiology Results (last 7 days)       Procedure Component " Value Units Date/Time    Aerobic culture [[id number]] Collected: 12/15/23 1655    Order Status: Completed Specimen: Wound from Back Updated: 12/18/23 0915     Aerobic Bacterial Culture No growth    Narrative:      2.) 2,3 Disc space    Aerobic culture [[id number]] Collected: 12/15/23 1603    Order Status: Completed Specimen: Wound from Back Updated: 12/18/23 0915     Aerobic Bacterial Culture No growth    Narrative:      Paraspinal    Aerobic culture [[id number]] Collected: 12/15/23 1657    Order Status: Completed Specimen: Wound from Back Updated: 12/18/23 0913     Aerobic Bacterial Culture No growth    Narrative:      3.) 2,3 Disc space    Blood culture [[id number]] Collected: 12/12/23 0033    Order Status: Completed Specimen: Blood Updated: 12/17/23 0612     Blood Culture, Routine No growth after 5 days.    Blood culture [[id number]] Collected: 12/12/23 0033    Order Status: Completed Specimen: Blood Updated: 12/17/23 0612     Blood Culture, Routine No growth after 5 days.    AFB Culture & Smear [[id number]] Collected: 12/15/23 1603    Order Status: Completed Specimen: Wound from Back Updated: 12/16/23 2127     AFB Culture & Smear Culture in progress    Narrative:      Paraspinal    AFB Culture & Smear [[id number]] Collected: 12/15/23 1655    Order Status: Completed Specimen: Wound from Back Updated: 12/16/23 2127     AFB Culture & Smear Culture in progress    Narrative:      2.) 2,3 Disc space    AFB Culture & Smear [[id number]] Collected: 12/15/23 1657    Order Status: Completed Specimen: Wound from Back Updated: 12/16/23 2127     AFB Culture & Smear Culture in progress    Narrative:      3.) 2,3 Disc space    Aerobic culture [[id number]] Collected: 12/13/23 1703    Order Status: Completed Specimen: Abscess from Buttocks, Left Updated: 12/16/23 1109     Aerobic Bacterial Culture No growth    Culture, Anaerobe [[id number]] Collected: 12/15/23 1655    Order Status: Completed Specimen: Wound from Back Updated:  12/16/23 0710     Anaerobic Culture Culture in progress    Narrative:      2.) 2,3 Disc space    Culture, Anaerobe [[id number]] Collected: 12/15/23 1657    Order Status: Completed Specimen: Wound from Back Updated: 12/16/23 0710     Anaerobic Culture Culture in progress    Narrative:      3.) 2,3 Disc space    Culture, Anaerobe [[id number]] Collected: 12/15/23 1603    Order Status: Completed Specimen: Wound from Back Updated: 12/16/23 0710     Anaerobic Culture Culture in progress    Narrative:      Paraspinal    Blood culture x two cultures. Draw prior to antibiotics. [[id number]] Collected: 12/10/23 2007    Order Status: Completed Specimen: Blood from Peripheral, Antecubital, Left Updated: 12/15/23 2212     Blood Culture, Routine No growth after 5 days.    Narrative:      Aerobic and anaerobic    Blood culture x two cultures. Draw prior to antibiotics. [[id number]] Collected: 12/10/23 2007    Order Status: Completed Specimen: Blood from Peripheral, Hand, Left Updated: 12/15/23 2212     Blood Culture, Routine No growth after 5 days.    Narrative:      Aerobic and anaerobic    Gram stain [[id number]] Collected: 12/15/23 1657    Order Status: Completed Specimen: Wound from Back Updated: 12/15/23 1900     Gram Stain Result No WBC's      No organisms seen    Narrative:      3.) 2,3 Disc space    Gram stain [[id number]] Collected: 12/15/23 1655    Order Status: Completed Specimen: Wound from Back Updated: 12/15/23 1857     Gram Stain Result No WBC's      No organisms seen    Narrative:      2.) 2,3 Disc space    Gram stain [[id number]] Collected: 12/15/23 1603    Order Status: Completed Specimen: Wound from Back Updated: 12/15/23 1855     Gram Stain Result No WBC's      No organisms seen    Narrative:      Paraspinal    Fungus culture [[id number]] Collected: 12/15/23 1655    Order Status: Sent Specimen: Wound from Back Updated: 12/15/23 1730    Fungus culture [[id number]] Collected: 12/15/23 1657    Order Status: Sent  Specimen: Wound from Back Updated: 12/15/23 1727    Fungus culture [[id number]] Collected: 12/15/23 1603    Order Status: Sent Specimen: Wound from Back Updated: 12/15/23 1621    Culture, Anaerobe [[id number]]     Order Status: No result Specimen: Bone from Back     Aerobic culture [[id number]]     Order Status: No result Specimen: Bone from Back     AFB Culture & Smear [[id number]]     Order Status: No result Specimen: Bone from Back     Gram stain [[id number]]     Order Status: No result Specimen: Bone from Back     Fungus culture [[id number]]     Order Status: No result Specimen: Bone from Back     Culture, Anaerobic [[id number]] Collected: 12/13/23 1703    Order Status: Completed Specimen: Abscess from Buttocks, Left Updated: 12/15/23 0707     Anaerobic Culture Culture in progress    AFB Culture & Smear [[id number]] Collected: 12/13/23 1703    Order Status: Completed Specimen: Abscess from Buttocks, Left Updated: 12/14/23 2128     AFB Culture & Smear Culture in progress     AFB CULTURE STAIN No acid fast bacilli seen.    Gram stain [[id number]] Collected: 12/13/23 1703    Order Status: Completed Specimen: Abscess from Buttocks, Left Updated: 12/13/23 2104     Gram Stain Result No WBC's      No organisms seen    Fungus culture [[id number]] Collected: 12/13/23 1703    Order Status: Sent Specimen: Abscess from Buttocks, Left Updated: 12/13/23 1928          All pertinent labs from the last 24 hours have been reviewed.    Significant Diagnostics:  I have reviewed and interpreted all pertinent imaging results/findings within the past 24 hours.  X-Ray Chest AP Portable    Result Date: 12/16/2023  As above Electronically signed by: Laure Reynoso MD Date:    12/16/2023 Time:    12:43    CT Head Without Contrast    Result Date: 12/16/2023  Severe motion distorted examination as detailed above essentially nondiagnostic portions of the vertex and skull base. No evidence for acute intracranial hemorrhage or sulcal  effacement to suggest large territory recent infarction in the non motion distorted portion of the examination.  Clinical correlation and further evaluation with repeat attempts for imaging when patient better able to tolerate scanning advised. Electronically signed by: Maldonado Rodriguez DO Date:    12/16/2023 Time:    09:56 ]  X-Ray Chest 1 View S/P PICC Line by Nursing    Result Date: 12/19/2023  1. Satisfactory position of left-sided PICC. 2. Air-filled prominent bowel loops are partially seen in the field of view and may be dilated.  Dedicated abdominal radiograph should be considered for further evaluation. This report was flagged in Epic as abnormal. Electronically signed by: Chrissy San MD Date:    12/19/2023 Time:    16:42     Assessment/Plan:     * Acute osteomyelitis of lumbar spine  A 73M w/ hx CVA, CKD3, HTN, DM2, and COPD who presents with AMS likely 2/2 underling sepsis as well as progressive back pain and inability to ambulate for the past 2 weeks due to progressive L2-L3 osteodiscitis and presumed mechanical instability.          CTH 12/11: lacuanr infarcts   MRI w/wo L sp 12/11: severe central stenosis L4-5, discitis osteomyelitis at L2-3, L psoas abscess up to 2 cm  MRI C/T w/wo: very poor with alot of motion artifact, but no OM else where   US BLE 12/12: negative  12/10: , CRP >120  BCx NGTD  MRI Head, Csp, CTH 12/16: nondiagnostic  MRI C/T/L w/wo 12/16 without contribution to picture of fluctuating sensorium and motor exam; severe spondylosis in Csp with some STIR and contrast enhancement prevertebral and possibly early osteodiscitis. MRI Tsp without concerning findings and MRI Lsp with expected postop changes.      Now s/p L1-L4 decompression, L2-L3 TLIF and L1-L4 PSF on 12/15      POD#5    Subaxial cervical spondylosis which required PCF down the line  Encephalopathy is improving.  Okay to step-down back to hospital medicine from neurosurgical standpoint pending bed  Continue  multimodal pain medication   HV and WV removed 12/20   LSO brace when OOB  Satting well  MAP >65  Advance diet as tolerated   Intraoperative cultures NGTD, continue abx per ID  DVT ppx   PT/OT and OOB   Rest of care per primary and NCC  Please notify neurosurgery on call for any acute neurologic change    Dispo: TTF to             Juan Pablo Leal MD  Neurosurgery  [facility]

## 2023-12-20 NOTE — CONSULTS
[facility]  Skin Integrity KEV  Consult Note    Patient Name: Bj Pate Jr.  MRN: [id number]  Admission Date: 12/10/2023  Hospital Length of Stay: 9 days  Attending Physician: Monica Jefferson MD  Primary Care Provider: Jose Antonio Foster MD     Inpatient consult to Skin Integrity  Practitioner  Consult performed by: Katelyn Daniels, TORI  Consult ordered by: Katelyn Daniels, OTRI        Subjective:     History of Present Illness:  Bj Pate is a 73 year old male with a h/o CVA  about 8 to 10 years ago with R sided deficits, DM, HTN who presented from home with family with 3 weeks of abdominal pain. Seen at multiple EDS, and generally unremarkable work up as per family. Within last few days started to have progressive worsening back pain. Became acute altered 2 days ago w/, LE weakness now requiring assistance to walk, as well as urinary incontinence. Patient is A/O 0x at admit. Accompanied by Daughter, most of history from her.      Admission to ED patient was febrile at 102 F, tachycardic 130, tachypneic at 23, hypertensive at 140/72 initially satting well on room air later requiring 2 L nasal cannula.  WBCs elevated 12.74 anemia 11.0 platelets normal, sed rate 120+, BUN 27 magnesium 1.5, albumin 2.4, .1, troponin 0.126 urine trace proteinuria trace ketones lactate initially elevated 2.6 later 1.3 CT head unremarkable for acute processes CT abdomen and pelvis and MRI lumbar spine shows left psoas abscess L2-L3 osteomyleitis, and diskitis.  Neurosurgery was consulted. Patient admitted to hospital medicine service for further management. Skin integrity KEV consulted for evaluation of skin injury.    Scheduled Meds:   enoxparin  40 mg Subcutaneous Q24H (prophylaxis, 1700)    fluticasone furoate-vilanteroL  1 puff Inhalation Daily    gabapentin  200 mg Per NG tube TID    meropenem (MERREM) IVPB  2 g Intravenous Q8H    modafiniL  200 mg Per NG tube Before breakfast    mupirocin   Nasal  BID    senna-docusate 8.6-50 mg  1 tablet Per NG tube Daily    sodium chloride 0.9%  10 mL Intravenous Q6H    vancomycin (VANCOCIN) IV (PEDS and ADULTS)  1,750 mg Intravenous Q24H     Continuous Infusions:  PRN Meds:acetaminophen, dextrose 10%, dextrose 10%, glucagon (human recombinant), insulin aspart U-100, magnesium oxide, magnesium oxide, morphine, ondansetron, potassium bicarbonate, potassium bicarbonate, potassium bicarbonate, potassium, sodium phosphates, potassium, sodium phosphates, potassium, sodium phosphates, Flushing PICC/Midline Protocol **AND** sodium chloride 0.9% **AND** sodium chloride 0.9%, Pharmacy to dose Vancomycin consult **AND** vancomycin - pharmacy to dose    Review of patient's allergies indicates:   Allergen Reactions    Tomato (solanum lycopersicum) Hives    Naproxen Hives    Shrimp Other (See Comments)        Past Medical History:   Diagnosis Date    Acute on chronic systolic heart failure 12/13/2023    Anemia 06/04/2021    Anticoagulant long-term use     Basal ganglia hemorrhage     Left basal ganglia hemorrhage with resultant right-sided hemiparesis which has resolved.     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      Past Surgical History:   Procedure Laterality Date    CARPAL TUNNEL RELEASE Left 2/19/2020    Procedure: RELEASE, CARPAL TUNNEL LEFT;  Surgeon: Maria Luisa Mccurdy MD;  Location: [facility];  Service: Orthopedics;   Laterality: Left;    COLONOSCOPY N/A 7/28/2023    Procedure: COLONOSCOPY;  Surgeon: Jaylene Alvarado MD;  Location: [facility] ENDO;  Service: Endoscopy;  Laterality: N/A;    EPIDURAL STEROID INJECTION N/A 5/2/2019    Procedure: Injection, Steroid, Epidural Cervical;  Surgeon: Dariel Doan Jr., MD;  Location: [facility];  Service: Pain Management;  Laterality: N/A;  Cervical Epidural Steroid Injection     [id number]    Arrive @ 1130; ASA; Check BG    EPIDURAL STEROID INJECTION Bilateral 5/29/2019    Procedure: Lumbar Medial Branch Blocks;  Surgeon: Dariel Doan Jr., MD;  Location: [facility];  Service: Pain Management;  Laterality: Bilateral;  Bilateral Lumbar Medial Branch Blocks L3, L4, L5    03365  01243    Attive @ 1030 (11 arrival request); NO Sedation; ASA; Check BG    EPIDURAL STEROID INJECTION Bilateral 7/3/2019    Procedure: Lumbar Medial Branch Blocks;  Surgeon: Dariel Doan Jr., MD;  Location: [facility];  Service: Pain Management;  Laterality: Bilateral;  Bilateral Lumbar Medial Branch Blocks L3, L4, L5    13495  [id number]    Arrive @ 0930; NO Sedation; ASA; Check BG    EPIDURAL STEROID INJECTION N/A 8/7/2019    Procedure: Injection, Steroid, Epidural Cervical;  Surgeon: Dariel Doan Jr., MD;  Location: [facility];  Service: Pain Management;  Laterality: N/A;  Cervical Epidural Steroid Injection C7-T1    [id number]    Arrive @ 1115; Last ASA 7/30; Check BG    ESOPHAGOGASTRODUODENOSCOPY N/A 7/28/2023    Procedure: EGD (ESOPHAGOGASTRODUODENOSCOPY);  Surgeon: Jaylene Alvarado MD;  Location: [facility];  Service: Endoscopy;  Laterality: N/A;  inst via portal    FUSION, SPINE, LUMBAR, TLIF, POSTERIOR APPROACH, USING PEDICLE SCREW N/A 12/15/2023    Procedure: L-1 TO L-4 FUSION, SPINE, LUMBAR, TLIF, POSTERIOR APPROACH, USING PEDICLE SCREW;  Surgeon: Rolf Rincon MD;  Location: [facility] OR [address] [place] [address];  Service: Neurosurgery;  Laterality: N/A;    LEFT HEART CATHETERIZATION Left 9/21/2021    Procedure: Left heart  cath 9am start, R rad access;  Surgeon: Dariel Conner MD;  Location: [facility] CATH LAB;  Service: Cardiology;  Laterality: Left;  RN PRE OP Covid NEGATIVE ON  9-20-21.  C A    MIDLINE CATHETER PLACEMENT  12/12/2023    NO PAST SURGERIES         Family History       Problem Relation (Age of Onset)    Diabetes Paternal Grandfather    Heart disease Paternal Grandfather    Hypertension     No Known Problems Mother, Father, Sister, Brother, Maternal Aunt, Maternal Uncle, Paternal Aunt, Paternal Uncle, Maternal Grandmother, Maternal Grandfather, Paternal Grandmother          Tobacco Use    Smoking status: Never    Smokeless tobacco: Never   Substance and Sexual Activity    Alcohol use: Yes     Alcohol/week: 0.0 standard drinks of alcohol     Comment: occacionally    Drug use: No    Sexual activity: Not Currently     Review of Systems   Skin:  Positive for wound.       Objective:     Vital Signs (Most Recent):  Temp: 98.9 °F (37.2 °C) (12/20/23 0701)  Pulse: 108 (12/20/23 1300)  Resp: (!) 22 (12/20/23 1300)  BP: 114/64 (12/20/23 1300)  SpO2: 100 % (12/20/23 1300) Vital Signs (24h Range):  Temp:  [98.6 °F (37 °C)-99.8 °F (37.7 °C)] 98.9 °F (37.2 °C)  Pulse:  [103-114] 108  Resp:  [19-26] 22  SpO2:  [91 %-100 %] 100 %  BP: (106-144)/(57-80) 114/64     Weight: 119.7 kg (264 lb)  Body mass index is 38.99 kg/m².     Physical Exam  Constitutional:       Appearance: Normal appearance.   Skin:     General: Skin is warm and dry.      Findings: Lesion present.   Neurological:      Mental Status: He is alert.          Laboratory:  All pertinent labs reviewed within the last 24 hours.    Diagnostic Results:  None      Assessment/Plan:         KEV Skin Integrity Evaluation      Skin Integrity KEV evaluation of patient as part of the comprehensive skin care team.     He has been admitted for 9 days. Skin injury was noted on 12/11/23. POA yes.    Gluteal cleft        Derm  Intertriginous dermatitis associated with moisture  - consult  received for evaluation of skin injury.  - pt presented from home with family with 3 weeks of abdominal pain.   - linear partial thickness tissue loss to gluteal cleft region due to moisture from incontinence. Pole [place]je[place], moist wound bed.  - continue Triad bid/prn.  - Immerse surface.  - incontinent of stool. Condom catheter in place.  - wedge for offloading.  - RD following. TFs infusing. Optimize nutrition for wound healing.  - turn q2h.  - nursing to maintain pressure injury prevention measures and continue wound care per orders.          Thank you for your consult. I will follow-up with patient. Please contact us if you have any additional questions.      Katelyn Daniels, NP  Skin Integrity KEV  [facility]

## 2023-12-20 NOTE — PROGRESS NOTES
"[facility]  Adult Nutrition  Progress Note    SUMMARY       Recommendations    1.) Recommend if and when medically able to ADAT, with goal of Diabetic diet, texture per SLP.     2.) Continue TF of Impact Peptide 1.5 @ 50ml/hr to provide 1800 kcal, 113g PRO, and 924ml FF- FWF per MD.     3.) RD to monitor wt, nutritional status, skin, labs.    Goals: to meet % of EEN/EPN by next RD f/u  Nutrition Goal Status: goal met  Communication of RD Recs:  (POC)    Assessment and Plan    Nutrition Problem  Inadequate oral intake     Related to (etiology):   Inability to consume adequate nutrition     Signs and Symptoms (as evidenced by):   NPO      Interventions/Recommendations (treatment strategy):  Collaboration of nutritional care with other providers.  ADAT vs EN     Nutrition Diagnosis Status:   Continue    Reason for Assessment    Reason For Assessment: RD follow-up  Diagnosis: infection/sepsis  Relevant Medical History: hx CVA, CKD3, HTN, DM2, COPD  Interdisciplinary Rounds: did not attend    General Information Comments:   RD f/u. Pt failed SLP evaluation today 2/2 delayed swallow, TF resume and tolerating at goal rate. Pt oriented to self only per chart. NFPE not warranted at this time due to RD cover remotely. Per initial assessment, pt has had a -19% wt change since admit. Edema present upon admission. No new edema noted today. Wt changes may be r/t fluid shifts. Noted sig wt shifts throughout the past year, as per chart review.       Nutrition Discharge Planning: as per medical course    Nutrition Risk Screen    Nutrition Risk Screen: no indicators present    Nutrition/Diet History    Spiritual, Cultural Beliefs, Worship Practices, Values that Affect Care: no    Anthropometrics    Temp: 98.9 °F (37.2 °C)  Height Method: Stated  Height: 5' 9" (175.3 cm)  Height (inches): 69 in  Weight Method: Bed Scale  Weight: 119.7 kg (264 lb)  Weight (lb): 264 lb  Ideal Body Weight (IBW), Male: 160 " lb  % Ideal Body Weight, Male (lb): 165 %  BMI (Calculated): 39  BMI Grade: 35 - 39.9 - obesity - grade II       Lab/Procedures/Meds    Pertinent Labs Reviewed: reviewed  Pertinent Labs Comments: H/H: 7.8/24.3, Phos 1.7, glucose 174, BUN 41, AST 73, ALT 48  Pertinent Medications Reviewed: reviewed  Pertinent Medications Comments: polyethylene glycol, senna-docusate    Physical Findings/Assessment         Estimated/Assessed Needs    Weight Used For Calorie Calculations: 120.2 kg (264 lb 15.9 oz)  Energy Calorie Requirements (kcal): 1322- 1803 kcal  Energy Need Method: Kcal/kg (11-15 kcal/kg)  Protein Requirements: 110- 146g (1.5- 2.0g/kg of IBW)  Weight Used For Protein Calculations: 73 kg (160 lb 15 oz) (IBW d/t BMI >30.0)  Fluid Requirements (mL): 1ml/1kcal or per MD  Estimated Fluid Requirement Method: RDA Method  RDA Method (mL): 1322  CHO Requirement: 165- 225 g      Nutrition Prescription Ordered    Current Diet Order: FLD  Current Nutrition Support Formula Ordered: Impact Peptide 1.5  Current Nutrition Support Rate Ordered: 50 (ml)    Evaluation of Received Nutrient/Fluid Intake    Enteral Calories (kcal): 1800  Enteral Protein (gm): 113  Enteral (Free Water) Fluid (mL): 924  % Kcal Needs: 100  % Protein Needs: 100  I/O: + 9.9L since admit  Energy Calories Required: meeting needs  Protein Required: meeting needs  Fluid Required:  (as per MD)  Comments: LBM 12/19  Tolerance: tolerating  % Intake of Estimated Energy Needs: 75 - 100 %    Nutrition Risk    Level of Risk/Frequency of Follow-up:  (1x/week)     Monitor and Evaluation    Food and Nutrient Intake: energy intake  Food and Nutrient Adminstration: diet order  Physical Activity and Function: nutrition-related ADLs and IADLs  Anthropometric Measurements: weight, weight change, body mass index  Biochemical Data, Medical Tests and Procedures: electrolyte and renal panel, gastrointestinal profile, glucose/endocrine profile, inflammatory profile, lipid  profile  Nutrition-Focused Physical Findings: overall appearance, skin     Nutrition Follow-Up    RD Follow-up?: Yes

## 2023-12-20 NOTE — PT/OT/SLP PROGRESS
Physical Therapy Co-Treatment    Patient Name: Bj Pate Jr.   MRN: [id number]    Co-treatment performed for this visit due to patient need for two skilled therapists to ensure patient and staff safety and to accommodate for patient activity tolerance/pain management   Recommendations:     Discharge Recommendations: Moderate Intensity Therapy  Discharge Equipment Recommendations: lift device, hospital bed, wheelchair  Barriers to discharge: Increased level of assist and Decreased caregiver support    Assessment:     Bj Pate Jr. is a 73 y.o. male admitted with a medical diagnosis of Acute osteomyelitis of lumbar spine. He presents with the following impairments/functional limitations: weakness, impaired endurance, impaired self care skills, impaired sensation, impaired functional mobility, gait instability, impaired balance, impaired cognition, impaired coordination, decreased upper extremity function, decreased lower extremity function, decreased safety awareness, pain, decreased ROM, impaired fine motor, impaired skin, edema, impaired cardiopulmonary response to activity. Pt with poor tolerance to therapy session on this date. Pt with very minimal eye opening, verbalizations, or command following during treatment. Pt continues to require dependent assistance of 2-3 persons for all functional mobility with very limited participation while seated at EOB despite maximal encouragement and education. MD and RN notified of pt's current functional status. Pt would continue to benefit from skilled acute PT in order to address current deficits and progress functional mobility.     Rehab Prognosis: Fair; patient continues to benefit from acute skilled PT services to address these deficits and reach maximum level of function.  Recent Surgery: Procedure(s) (LRB):  L-1 TO L-4 FUSION, SPINE, LUMBAR, TLIF, POSTERIOR APPROACH, USING PEDICLE SCREW (N/A) 5 Days Post-Op    Plan:     During this hospitalization, patient to be  "seen 3 x/week to address the identified rehab impairments via gait training, therapeutic activities, therapeutic exercises, neuromuscular re-education and progress toward the following goals:    Plan of Care Expires:  01/19/24    Subjective     Chief Complaint: None verbalized  Patient/Family Comments/Goals: "No please"  Pain/Comfort:  Pain Rating 1:  (unable to rate)  Location - Side 1: Bilateral  Location - Orientation 1: generalized  Location 1: arm  Pain Addressed 1: Reposition, Distraction, Nurse notified  Pain Rating Post-Intervention 1:  (unable to rate)    Objective:     Communicated with RN prior to session. Patient found HOB elevated with blood pressure cuff, NG tube, pulse ox (continuous), telemetry, SCD, Condom Catheter upon PT entry to room.     General Precautions: Standard, aspiration, fall  Orthopedic Precautions: spinal precautions  Braces: LSO    Functional Mobility:  Bed Mobility:  Verbal cues for sequencing and technique  Rolling Left:  dependence of 3 persons  Rolling Right: dependence of 3 persons  Scooting: dependence of 2 persons  Supine to Sit: dependence of 2 persons for LE management and trunk management  Sit to Supine: dependence of 2 persons for LE management and trunk management  Transfers:   Deferred 2/2 poor static sitting tolerance and safety  Balance:   Static Sitting: Poor, able to maintain for 6 minute(s) with dependence 2/2 significant R posterolateral lean  Verbal cues for upright posture, maintenance of midline orientation, command following, visual tracking, visual attending, and improved use of BUE for support.  Dynamic Sitting: Poor: Patient unable to accept challenge or move without loss of balance, dependence  Pt able to wiggle B fingers to command. Limited active BUE movement noted while seated at EOB  Static Standing: not assessed this visit  Dynamic Standing: not assessed this visit    AM-PAC 6 CLICK MOBILITY  Turning over in bed (including adjusting bedclothes, sheets " and blankets)?: 1  Sitting down on and standing up from a chair with arms (e.g., wheelchair, bedside commode, etc.): 1  Moving from lying on back to sitting on the side of the bed?: 1  Moving to and from a bed to a chair (including a wheelchair)?: 1  Need to walk in hospital room?: 1  Climbing 3-5 steps with a railing?: 1  Basic Mobility Total Score: 6     Therapeutic Activities and Exercises:  Patient educated on role of acute care PT and PT POC, safety while in hospital including calling nurse for mobility, and call light usage  Pt educated on the effects of bed rest and the importance of OOB activity. Pt encouraged to sit with bed in chair position majority of day as tolerated. Pt verbalized understanding.  Answered all questions within PT scope of practice and addressed functional mobility concerns.  Pt educated on importance of maximal participation in therapy session in order to reduce negative effects of prolonged sedentary positioning.   Pt soiled with incontinent BM at end of session. Pericare completed and all bed linens changed from assist from PT/OT/RN.    Patient left HOB elevated with all lines intact, call button in reach, and RN notified.    GOALS:   Multidisciplinary Problems       Physical Therapy Goals          Problem: Physical Therapy    Goal Priority Disciplines Outcome Goal Variances Interventions   Physical Therapy Goal     PT, PT/OT Ongoing, Progressing     Description: Goals to be met by: 2024     Patient will increase functional independence with mobility by performin. Supine to sit with Moderate Assistance  2. Sit to supine with Moderate Assistance  3. Sit to stand transfer with Maximum Assistance  4. Bed to chair transfer with Maximum Assistance using LRAD  5. Sitting at edge of bed x5 minutes with Minimal Assistance  6. Lower extremity exercise program x15 reps per handout, with assistance as needed                         Time Tracking:     PT Received On: 23  PT  Start Time: 0950     PT Stop Time: 1028  PT Total Time (min): 38 min     Billable Minutes: Therapeutic Activity 25 and Neuromuscular Re-education 10      Treatment Type: Treatment  PT/PTA: PT     Number of PTA visits since last PT visit: 0     12/20/2023

## 2023-12-20 NOTE — PLAN OF CARE
Recommendations    1.) Recommend if and when medically able to ADAT, with goal of Diabetic diet, texture per SLP.     2.) Continue TF of Impact Peptide 1.5 @ 50ml/hr to provide 1800 kcal, 113g PRO, and 924ml FF- FWF per MD.     3.) RD to monitor wt, nutritional status, skin, labs.    Goals: to meet % of EEN/EPN by next RD f/u  Nutrition Goal Status: goal met  Communication of RD Recs:  (POC)

## 2023-12-20 NOTE — ASSESSMENT & PLAN NOTE
- consult received for evaluation of skin injury.  - pt presented from home with family with 3 weeks of abdominal pain.   - linear partial thickness tissue loss to gluteal cleft region due to moisture from incontinence. [place], moist wound bed.  - continue Triad bid/prn.  - Immerse surface.  - incontinent of stool. Condom catheter in place.  - wedge for offloading.  - RD following. TFs infusing. Optimize nutrition for wound healing.  - turn q2h.  - nursing to maintain pressure injury prevention measures and continue wound care per orders.

## 2023-12-21 PROBLEM — E83.39 HYPOPHOSPHATEMIA: Status: ACTIVE | Noted: 2023-12-21

## 2023-12-21 PROBLEM — R65.20 SEVERE SEPSIS: Status: RESOLVED | Noted: 2023-12-11 | Resolved: 2023-12-21

## 2023-12-21 PROBLEM — I80.9 THROMBOPHLEBITIS: Status: ACTIVE | Noted: 2023-12-21

## 2023-12-21 PROBLEM — A41.9 SEVERE SEPSIS: Status: RESOLVED | Noted: 2023-12-11 | Resolved: 2023-12-21

## 2023-12-21 PROBLEM — J98.11 ATELECTASIS: Status: ACTIVE | Noted: 2023-12-21

## 2023-12-21 LAB
ALBUMIN SERPL BCP-MCNC: 1.3 G/DL (ref 3.5–5.2)
ALP SERPL-CCNC: 270 U/L (ref 55–135)
ALT SERPL W/O P-5'-P-CCNC: 356 U/L (ref 10–44)
ANION GAP SERPL CALC-SCNC: 7 MMOL/L (ref 8–16)
ANISOCYTOSIS BLD QL SMEAR: SLIGHT
AST SERPL-CCNC: 799 U/L (ref 10–40)
BACTERIA SPEC ANAEROBE CULT: NORMAL
BASOPHILS NFR BLD: 0 % (ref 0–1.9)
BILIRUB SERPL-MCNC: 0.6 MG/DL (ref 0.1–1)
BUN SERPL-MCNC: 50 MG/DL (ref 8–23)
CALCIUM SERPL-MCNC: 9.1 MG/DL (ref 8.7–10.5)
CHLORIDE SERPL-SCNC: 105 MMOL/L (ref 95–110)
CO2 SERPL-SCNC: 30 MMOL/L (ref 23–29)
CREAT SERPL-MCNC: 0.8 MG/DL (ref 0.5–1.4)
DIFFERENTIAL METHOD BLD: ABNORMAL
EOSINOPHIL NFR BLD: 1 % (ref 0–8)
ERYTHROCYTE [DISTWIDTH] IN BLOOD BY AUTOMATED COUNT: 15.1 % (ref 11.5–14.5)
EST. GFR  (NO RACE VARIABLE): >60 ML/MIN/1.73 M^2
FINAL PATHOLOGIC DIAGNOSIS: NORMAL
GLUCOSE SERPL-MCNC: 139 MG/DL (ref 70–110)
GROSS: NORMAL
HCT VFR BLD AUTO: 22.5 % (ref 40–54)
HGB BLD-MCNC: 7.2 G/DL (ref 14–18)
IMM GRANULOCYTES # BLD AUTO: ABNORMAL K/UL (ref 0–0.04)
IMM GRANULOCYTES NFR BLD AUTO: ABNORMAL % (ref 0–0.5)
LYMPHOCYTES NFR BLD: 1 % (ref 18–48)
Lab: NORMAL
MAGNESIUM SERPL-MCNC: 1.8 MG/DL (ref 1.6–2.6)
MCH RBC QN AUTO: 30.5 PG (ref 27–31)
MCHC RBC AUTO-ENTMCNC: 32 G/DL (ref 32–36)
MCV RBC AUTO: 95 FL (ref 82–98)
MICROSCOPIC EXAM: NORMAL
MONOCYTES NFR BLD: 3 % (ref 4–15)
NEUTROPHILS NFR BLD: 95 % (ref 38–73)
NRBC BLD-RTO: 0 /100 WBC
PHOSPHATE SERPL-MCNC: 1.7 MG/DL (ref 2.7–4.5)
PLATELET # BLD AUTO: 432 K/UL (ref 150–450)
PLATELET BLD QL SMEAR: ABNORMAL
PMV BLD AUTO: 11.4 FL (ref 9.2–12.9)
POCT GLUCOSE: 138 MG/DL (ref 70–110)
POCT GLUCOSE: 149 MG/DL (ref 70–110)
POCT GLUCOSE: 180 MG/DL (ref 70–110)
POCT GLUCOSE: 250 MG/DL (ref 70–110)
POIKILOCYTOSIS BLD QL SMEAR: SLIGHT
POTASSIUM SERPL-SCNC: 3.8 MMOL/L (ref 3.5–5.1)
PROT SERPL-MCNC: 6.2 G/DL (ref 6–8.4)
RBC # BLD AUTO: 2.36 M/UL (ref 4.6–6.2)
SODIUM SERPL-SCNC: 142 MMOL/L (ref 136–145)
TARGETS BLD QL SMEAR: ABNORMAL
WBC # BLD AUTO: 21.2 K/UL (ref 3.9–12.7)

## 2023-12-21 PROCEDURE — 84100 ASSAY OF PHOSPHORUS: CPT

## 2023-12-21 PROCEDURE — 94640 AIRWAY INHALATION TREATMENT: CPT

## 2023-12-21 PROCEDURE — 63600175 PHARM REV CODE 636 W HCPCS

## 2023-12-21 PROCEDURE — 25000003 PHARM REV CODE 250: Performed by: HOSPITALIST

## 2023-12-21 PROCEDURE — 97535 SELF CARE MNGMENT TRAINING: CPT

## 2023-12-21 PROCEDURE — 94761 N-INVAS EAR/PLS OXIMETRY MLT: CPT

## 2023-12-21 PROCEDURE — 63600175 PHARM REV CODE 636 W HCPCS: Performed by: HOSPITALIST

## 2023-12-21 PROCEDURE — 27201109 HC SYSTEM FECAL MANAGEMENT

## 2023-12-21 PROCEDURE — 25000242 PHARM REV CODE 250 ALT 637 W/ HCPCS: Performed by: PSYCHIATRY & NEUROLOGY

## 2023-12-21 PROCEDURE — 27000190 HC CPAP FULL FACE MASK W/VALVE

## 2023-12-21 PROCEDURE — 11000001 HC ACUTE MED/SURG PRIVATE ROOM

## 2023-12-21 PROCEDURE — 27100171 HC OXYGEN HIGH FLOW UP TO 24 HOURS

## 2023-12-21 PROCEDURE — 85027 COMPLETE CBC AUTOMATED: CPT

## 2023-12-21 PROCEDURE — 25000003 PHARM REV CODE 250: Performed by: REGISTERED NURSE

## 2023-12-21 PROCEDURE — 94799 UNLISTED PULMONARY SVC/PX: CPT | Mod: XB

## 2023-12-21 PROCEDURE — 83735 ASSAY OF MAGNESIUM: CPT

## 2023-12-21 PROCEDURE — 63600175 PHARM REV CODE 636 W HCPCS: Performed by: STUDENT IN AN ORGANIZED HEALTH CARE EDUCATION/TRAINING PROGRAM

## 2023-12-21 PROCEDURE — 99900035 HC TECH TIME PER 15 MIN (STAT)

## 2023-12-21 PROCEDURE — A4216 STERILE WATER/SALINE, 10 ML: HCPCS | Performed by: PSYCHIATRY & NEUROLOGY

## 2023-12-21 PROCEDURE — 94660 CPAP INITIATION&MGMT: CPT

## 2023-12-21 PROCEDURE — 99024 POSTOP FOLLOW-UP VISIT: CPT | Mod: ,,, | Performed by: PHYSICIAN ASSISTANT

## 2023-12-21 PROCEDURE — 92526 ORAL FUNCTION THERAPY: CPT

## 2023-12-21 PROCEDURE — 80053 COMPREHEN METABOLIC PANEL: CPT

## 2023-12-21 PROCEDURE — 85007 BL SMEAR W/DIFF WBC COUNT: CPT

## 2023-12-21 PROCEDURE — 99233 SBSQ HOSP IP/OBS HIGH 50: CPT | Mod: ,,, | Performed by: REGISTERED NURSE

## 2023-12-21 PROCEDURE — 25000003 PHARM REV CODE 250

## 2023-12-21 PROCEDURE — 25000003 PHARM REV CODE 250: Performed by: PSYCHIATRY & NEUROLOGY

## 2023-12-21 RX ORDER — FUROSEMIDE 10 MG/ML
40 INJECTION INTRAMUSCULAR; INTRAVENOUS
Status: DISCONTINUED | OUTPATIENT
Start: 2023-12-21 | End: 2023-12-22

## 2023-12-21 RX ORDER — LOSARTAN POTASSIUM 25 MG/1
25 TABLET ORAL DAILY
Status: DISCONTINUED | OUTPATIENT
Start: 2023-12-21 | End: 2023-12-21

## 2023-12-21 RX ORDER — LOSARTAN POTASSIUM 25 MG/1
25 TABLET ORAL DAILY
Status: DISCONTINUED | OUTPATIENT
Start: 2023-12-21 | End: 2023-12-22

## 2023-12-21 RX ADMIN — LOSARTAN POTASSIUM 25 MG: 25 TABLET, FILM COATED ORAL at 05:12

## 2023-12-21 RX ADMIN — MUPIROCIN: 20 OINTMENT TOPICAL at 09:12

## 2023-12-21 RX ADMIN — Medication 10 ML: at 12:12

## 2023-12-21 RX ADMIN — MEROPENEM 2 G: 1 INJECTION INTRAVENOUS at 10:12

## 2023-12-21 RX ADMIN — GABAPENTIN 200 MG: 100 CAPSULE ORAL at 09:12

## 2023-12-21 RX ADMIN — FLUTICASONE FUROATE AND VILANTEROL TRIFENATATE 1 PUFF: 200; 25 POWDER RESPIRATORY (INHALATION) at 11:12

## 2023-12-21 RX ADMIN — ENOXAPARIN SODIUM 40 MG: 40 INJECTION SUBCUTANEOUS at 05:12

## 2023-12-21 RX ADMIN — PSYLLIUM HUSK 1 PACKET: 3.4 POWDER ORAL at 09:12

## 2023-12-21 RX ADMIN — Medication 10 ML: at 01:12

## 2023-12-21 RX ADMIN — FUROSEMIDE 40 MG: 10 INJECTION, SOLUTION INTRAMUSCULAR; INTRAVENOUS at 05:12

## 2023-12-21 RX ADMIN — INSULIN ASPART 2 UNITS: 100 INJECTION, SOLUTION INTRAVENOUS; SUBCUTANEOUS at 09:12

## 2023-12-21 RX ADMIN — Medication 10 ML: at 05:12

## 2023-12-21 RX ADMIN — GABAPENTIN 200 MG: 100 CAPSULE ORAL at 03:12

## 2023-12-21 RX ADMIN — INSULIN ASPART 2 UNITS: 100 INJECTION, SOLUTION INTRAVENOUS; SUBCUTANEOUS at 01:12

## 2023-12-21 RX ADMIN — MEROPENEM 2 G: 1 INJECTION INTRAVENOUS at 05:12

## 2023-12-21 RX ADMIN — MEROPENEM 2 G: 1 INJECTION INTRAVENOUS at 01:12

## 2023-12-21 RX ADMIN — MODAFINIL 200 MG: 100 TABLET ORAL at 05:12

## 2023-12-21 NOTE — PT/OT/SLP PROGRESS
Speech Language Pathology Treatment    Patient Name:  Bj Pate Jr.   MRN:  [id number]  Admitting Diagnosis: Acute osteomyelitis of lumbar spine    Recommendations:                 General Recommendations:  Dysphagia therapy, Speech language evaluation, and Cognitive-linguistic evaluation  Diet recommendations:  Puree Diet - IDDSI Level 4, Liquid Diet Level: Thin liquids - IDDSI Level 0   Aspiration Precautions: 1 bite/sip at a time, Assistance with meals, Feed only when awake/alert, Frequent oral care, Ice chips sparingly, Meds whole buried in puree, Meds whole 1 at a time, Small bites/sips, and Standard aspiration precautions   General Precautions: Standard, aspiration, fall  Communication strategies:  provide increased time to answer and go to room if call light pushed    Assessment:     Bj Pate Jr. is a 73 y.o. male with an SLP diagnosis of Dysphagia and Cognitive-Linguistic Impairment.  He presents with improved JD this date.    Subjective     Pt receiving patient care upon initial attempt. Spoke with RN prior to session. NGT present,. Ex wife at beside.    Pain/Comfort:  Pain Rating 1: 0/10  Pain Rating Post-Intervention 1: 0/10    Respiratory Status: Room air    Objective:     Has the patient been evaluated by SLP for swallowing?   Yes  Keep patient NPO? No     Pt seen bedside for ongoing swallow assessment. HOB raised prior to PO trials. Pt with ongoing confusion and poor attention as well as breathy, diminished vocal quality. Pt observed to have dry throat clearing prior to PO trials.  Provided feed assist with ice chips x2, tsp thin x4, cup sips x2, straw sips x2 oz, tsp puree x5, cracker x1 bite soft solids (cracker crushed in puree) x1. He demonstrated adequate acceptance/extraction, prolonged and reduced mastication with cracker. Pt unable to spit cracker out despite cyclic ingestion and cuing therefore clinician had to clear with oral swab. He demonstrated delayed AP transit and appearance of  delayed swallow trigger, though no overt s/sx of airway compromise appreciated.  Recommend he be placed on a puree solid and thin liquid diet at this time, meds crushed in puree. Education provided re: role of SLP, diet levels and consistencies, diet recs, swallow precs, s/s aspiration and SLP POC.  Caregiver verbalized understanding and agreement, pt requires ongoing reinforcement. RN updated post session.      Goals:   Multidisciplinary Problems       SLP Goals          Problem: SLP    Goal Priority Disciplines Outcome   SLP Goal     SLP Ongoing, Progressing   Description: Speech Language Pathology Goals  Goals expected to be met by 12/25/23    1. Pt will tolerate thin liquids w/o overt S/S aspiration, MIN A  2. Pt will participate in ongoing assessment of swallow function to determine safest, least restrictive means of nutrition/hydration  3. Pt will participate in further assessment of cognitive-linguistic skills to determine ongoing POC needs  4. Educate Pt and family on aspiration precautions and SLP POC                       Plan:     Patient to be seen:  4 x/week   Plan of Care expires:  01/17/24  Plan of Care reviewed with:  patient (ex wife)   SLP Follow-Up:  Yes       Discharge recommendations:   (tbd)   Barriers to Discharge:  Level of Skilled Assistance Needed      Time Tracking:     SLP Treatment Date:   12/21/23  Speech Start Time:  1434  Speech Stop Time:  1458     Speech Total Time (min):  24 min    Billable Minutes: Treatment Swallowing Dysfunction 14 and Self Care/Home Management Training 10    12/21/2023

## 2023-12-21 NOTE — PROGRESS NOTES
[facility] - Neurosurgery [facility])  Infectious Disease  Progress Note    Patient Name: Bj Pate Jr.  MRN: [id number]  Admission Date: 12/10/2023  Length of Stay: 10 days  Attending Physician: Christ Rdud MD  Primary Care Provider: Jose Antonio Foster MD    Isolation Status: No active isolations  Assessment/Plan:      ID  * Acute osteomyelitis of lumbar spine  74 yo male with PMH of CVA, CKD3, HTN, DM2, COPD admitted with increasing lethargy s/p fall found to have L psoas abscess/lumbar OM s/p IR aspiration of L2/3 disc space (cx ngtd) and washout with lumbar fusion/TLIF/laminectomy with NSGY on 12/15 - post-op notable for lethargy with transfer to ICU requiring intubation and pressors. Op note with purulent material, cx neg and path in process, so far no growth to date. Post-op MRI revealed stable lumbar spondylodiscitis and L psoas abscess, possible cervical spondylodiscitis and bibasilar consolidations.     WBC uptrending, but afebrile. Incision assessed at bedside, clean, dry, intact. Without swelling or concern for infection. Ultrasound noting right superficial thrombophlebitis of right cephalic vein. Labs notable for worsening elevated transaminases.       Recommendations:  - continue empiric vancomycin. pharm to dose. Continue meropenem 2g IV 8hr. Anticipate 6-8 wks Iv abx.  - follow up pathology.   - defer to medical team for elevated transaminases work up.   - plan reviewed with ID staff. ID will follow.             Discitis  See AP above    GI  Psoas muscle abscess  See AP above      Thank you for your consult. I will follow-up with patient. Please contact us if you have any additional questions.    Christiano Ashley NP  Infectious Disease  [facility] - Neurosurgery ([facility])    Subjective:     Principal Problem:Acute osteomyelitis of lumbar spine    HPI: Mr. Pate is a 74 yo male with PMH of CVA, CKD3, HTN, DM2, COPD who presents with increasing lethargy and confusion.     Pt remains altered  during assessment, history gathered per family at bedside and chart review. Per chart review, pt reported ~3 weeks of low back pain, as well as a fall leading up to presentation. Following the fall, pt reported lower extremity weakness and urinary retention. Daughter at bedside states that pt has not had any surgical procedures or injections recently, but was treated with pain management in 2019 and had lumbar and cervical injections. Pt is retired, but was a  previously. Family denied him having pets, denied adventurous hobbies, denied raw food intake. Daughter reported recent ER admission for abdominal pain. States pt required medication for a parasite that improved his symptoms. Upon chart review, appears pt presented in August with abdominal pain, CT AP was negative for acute findings. Pt was discharged and followed up with PCP shortly after and stated his symptoms had improved.     On admission, pt was febrile to 102, and tachycardic, with increasing leukocytosis, 13->16k, stable anemia, elevated sed/CRP: >120, 375, procal 1.37. blood cultures negative on admission. MRI lumbar spine noting discitis/osteomyelitis at L2-3 with left psoas abscess (2.2cm), as well as early osteo L3-4, noting difficult study and epidural abscess was difficult to exclude. CT head without evidence of superimposed acute intracranial process. MRI brain, cervical spine, and thoracic spine pending.     NSGY following and recommended IR consult for potential psoas abscess drainage and culture. Planning L2-pelvis decompression and fusion on 12/15/23.  IR planning CT guided aspiration/drainage of psoas abscess on either 12/12 or 12/13.     Pt is currently on vanc and cefepime. ID was consulted for antibiotic recs.       Interval History:     Afebrile. HDS. More alert. WBC remains elevated, 21k. Complaining of back pain. Incision clean, dry. Continues with loose stools, with flexiseal.     Review of Systems   Constitutional:   Negative for fever.   Respiratory:  Negative for shortness of breath.    Gastrointestinal:  Negative for abdominal distention and abdominal pain.   Genitourinary:  Negative for dysuria.   Musculoskeletal:  Positive for arthralgias and myalgias.   Skin:  Positive for wound.   All other systems reviewed and are negative.    Objective:     Vital Signs (Most Recent):  Temp: 98.5 °F (36.9 °C) (12/21/23 0808)  Pulse: (!) 115 (12/21/23 0808)  Resp: 20 (12/21/23 0808)  BP: (!) 97/56 (12/21/23 0808)  SpO2: 96 % (12/21/23 0808) Vital Signs (24h Range):  Temp:  [98.3 °F (36.8 °C)-100.1 °F (37.8 °C)] 98.5 °F (36.9 °C)  Pulse:  [108-118] 115  Resp:  [16-23] 20  SpO2:  [95 %-100 %] 96 %  BP: ()/(56-77) 97/56     Weight: 119.7 kg (264 lb)  Body mass index is 38.99 kg/m².    Estimated Creatinine Clearance: 108.2 mL/min (based on SCr of 0.8 mg/dL).     Physical Exam  Vitals reviewed.   Constitutional:       General: He is not in acute distress.     Appearance: He is not ill-appearing.   HENT:      Head: Normocephalic and atraumatic.      Comments: NG tube in place  Pulmonary:      Effort: Pulmonary effort is normal. No respiratory distress.      Breath sounds: No stridor.   Abdominal:      General: There is no distension.      Palpations: Abdomen is soft.      Tenderness: There is no abdominal tenderness.   Genitourinary:     Comments: bryant  Musculoskeletal:      Right lower leg: No edema.      Left lower leg: No edema.      Comments: RUE> LUE edematous  Pain with movement and palpation of RUE   Skin:     General: Skin is warm and dry.      Comments: Midline incision clean, dry, intact.           Significant Labs:   Microbiology Results (last 7 days)       Procedure Component Value Units Date/Time    Culture, Anaerobic [[id number]] Collected: 12/13/23 1703    Order Status: Completed Specimen: Abscess from Buttocks, Left Updated: 12/21/23 0743     Anaerobic Culture No anaerobes isolated    Fungus culture [[id number]]  Collected: 12/15/23 1657    Order Status: Completed Specimen: Wound from Back Updated: 12/20/23 1357     Fungus (Mycology) Culture Culture in progress    Narrative:      3.) 2,3 Disc space    Fungus culture [[id number]] Collected: 12/15/23 1603    Order Status: Completed Specimen: Wound from Back Updated: 12/20/23 1357     Fungus (Mycology) Culture Culture in progress    Narrative:      Paraspinal    Fungus culture [[id number]] Collected: 12/15/23 1655    Order Status: Completed Specimen: Wound from Back Updated: 12/20/23 1357     Fungus (Mycology) Culture Culture in progress    Narrative:      2.) 2,3 Disc space    Fungus culture [[id number]] Collected: 12/13/23 1703    Order Status: Completed Specimen: Abscess from Buttocks, Left Updated: 12/20/23 1312     Fungus (Mycology) Culture Culture in progress    Culture, Anaerobe [[id number]] Collected: 12/15/23 1657    Order Status: Completed Specimen: Wound from Back Updated: 12/19/23 0852     Anaerobic Culture Culture in progress    Narrative:      3.) 2,3 Disc space    Culture, Anaerobe [[id number]] Collected: 12/15/23 1655    Order Status: Completed Specimen: Wound from Back Updated: 12/19/23 0851     Anaerobic Culture Culture in progress    Narrative:      2.) 2,3 Disc space    Culture, Anaerobe [[id number]] Collected: 12/15/23 1603    Order Status: Completed Specimen: Wound from Back Updated: 12/19/23 0850     Anaerobic Culture Culture in progress    Narrative:      Paraspinal    AFB Culture & Smear [[id number]] Collected: 12/15/23 1655    Order Status: Completed Specimen: Wound from Back Updated: 12/18/23 1436     AFB Culture & Smear Culture in progress     AFB CULTURE STAIN No acid fast bacilli seen.    Narrative:      2.) 2,3 Disc space    AFB Culture & Smear [[id number]] Collected: 12/15/23 1657    Order Status: Completed Specimen: Wound from Back Updated: 12/18/23 1436     AFB Culture & Smear Culture in progress     AFB CULTURE STAIN No acid fast bacilli seen.     Narrative:      3.) 2,3 Disc space    AFB Culture & Smear [[id number]] Collected: 12/15/23 1603    Order Status: Completed Specimen: Wound from Back Updated: 12/18/23 1436     AFB Culture & Smear Culture in progress     AFB CULTURE STAIN No acid fast bacilli seen.    Narrative:      Paraspinal    Aerobic culture [[id number]] Collected: 12/15/23 1655    Order Status: Completed Specimen: Wound from Back Updated: 12/18/23 0915     Aerobic Bacterial Culture No growth    Narrative:      2.) 2,3 Disc space    Aerobic culture [[id number]] Collected: 12/15/23 1603    Order Status: Completed Specimen: Wound from Back Updated: 12/18/23 0915     Aerobic Bacterial Culture No growth    Narrative:      Paraspinal    Aerobic culture [[id number]] Collected: 12/15/23 1657    Order Status: Completed Specimen: Wound from Back Updated: 12/18/23 0913     Aerobic Bacterial Culture No growth    Narrative:      3.) 2,3 Disc space    Blood culture [[id number]] Collected: 12/12/23 0033    Order Status: Completed Specimen: Blood Updated: 12/17/23 0612     Blood Culture, Routine No growth after 5 days.    Blood culture [[id number]] Collected: 12/12/23 0033    Order Status: Completed Specimen: Blood Updated: 12/17/23 0612     Blood Culture, Routine No growth after 5 days.    Aerobic culture [[id number]] Collected: 12/13/23 1703    Order Status: Completed Specimen: Abscess from Buttocks, Left Updated: 12/16/23 1109     Aerobic Bacterial Culture No growth    Blood culture x two cultures. Draw prior to antibiotics. [[id number]] Collected: 12/10/23 2007    Order Status: Completed Specimen: Blood from Peripheral, Antecubital, Left Updated: 12/15/23 2212     Blood Culture, Routine No growth after 5 days.    Narrative:      Aerobic and anaerobic    Blood culture x two cultures. Draw prior to antibiotics. [[id number]] Collected: 12/10/23 2007    Order Status: Completed Specimen: Blood from Peripheral, Hand, Left Updated: 12/15/23 2212     Blood  Culture, Routine No growth after 5 days.    Narrative:      Aerobic and anaerobic    Gram stain [[id number]] Collected: 12/15/23 1657    Order Status: Completed Specimen: Wound from Back Updated: 12/15/23 1900     Gram Stain Result No WBC's      No organisms seen    Narrative:      3.) 2,3 Disc space    Gram stain [[id number]] Collected: 12/15/23 1655    Order Status: Completed Specimen: Wound from Back Updated: 12/15/23 1857     Gram Stain Result No WBC's      No organisms seen    Narrative:      2.) 2,3 Disc space    Gram stain [[id number]] Collected: 12/15/23 1603    Order Status: Completed Specimen: Wound from Back Updated: 12/15/23 1855     Gram Stain Result No WBC's      No organisms seen    Narrative:      Paraspinal    Culture, Anaerobe [[id number]]     Order Status: No result Specimen: Bone from Back     Aerobic culture [[id number]]     Order Status: No result Specimen: Bone from Back     AFB Culture & Smear [[id number]]     Order Status: No result Specimen: Bone from Back     Gram stain [[id number]]     Order Status: No result Specimen: Bone from Back     Fungus culture [[id number]]     Order Status: No result Specimen: Bone from Back     AFB Culture & Smear [[id number]] Collected: 12/13/23 1703    Order Status: Completed Specimen: Abscess from Buttocks, Left Updated: 12/14/23 2128     AFB Culture & Smear Culture in progress     AFB CULTURE STAIN No acid fast bacilli seen.          Pathology Results  (Last 10 years)                 07/28/23 1133  Specimen to Pathology, Surgery Gastrointestinal tract Final result    Narrative:  Pre-op Diagnosis: Diarrhea, unspecified type [R19.7]   Abnormal finding on GI tract imaging [R93.3]   Procedure(s):   EGD (ESOPHAGOGASTRODUODENOSCOPY)   COLONOSCOPY   Jar #1: Transverse colon polyp x1   Which provider would you like to cc?->HELEN AWAD   Release to patient->Immediate   Specimen total (fresh, frozen, permanent):->1               Significant Imaging: I have  reviewed all pertinent imaging results/findings within the past 24 hours.

## 2023-12-21 NOTE — CARE UPDATE
"RAPID RESPONSE NURSE CHART REVIEW        Chart Reviewed: 12/21/2023, 9:28 AM    MRN: [id number]  Bed: 566/943 A    Dx: Acute osteomyelitis of lumbar spine    Bj Pate Jr. has a past medical history of Acute on chronic systolic heart failure, Anemia, Anticoagulant long-term use, Basal ganglia hemorrhage, Benign hypertension with CKD (chronic kidney disease) stage III, Cataract, Chronic idiopathic gout of multiple sites, Chronic kidney disease, stage 3, COPD (chronic obstructive pulmonary disease), Erectile dysfunction, Gout, Hemorrhoids without complication, Hyperlipidemia, Morbid obesity, Obstructive sleep apnea on CPAP, Reactive airway disease without complication, Severe sepsis, Stroke, Thalamic infarct, acute (right), and 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    Last VS: BP (!) 97/56 (BP Location: Right arm, Patient Position: Lying)   Pulse (!) 115   Temp 98.5 °F (36.9 °C) (Oral)   Resp 20   Ht 5' 9" (1.753 m)   Wt 119.7 kg (264 lb)   SpO2 96%   BMI 38.99 kg/m²     24H Vital Sign Range:  Temp:  [98.3 °F (36.8 °C)-100.1 °F (37.8 °C)]   Pulse:  [108-118]   Resp:  [16-26]   BP: ()/(56-80)   SpO2:  [94 %-100 %]     Level of Consciousness (AVPU): responds to voice    Recent Labs     12/19/23  0145 12/20/23  0019 12/21/23  0347   WBC 18.47* 21.56* 21.20*   HGB 7.7* 7.8* 7.2*   HCT 23.6* 24.3* 22.5*    418 432       Recent Labs     12/19/23  0145 12/20/23  0019 12/21/23  0347    140 142   K 3.8 3.9 3.8    106 105   CO2 25 26 30*   BUN 33* 41* 50*   CREATININE 0.9 0.8 0.8   * 174* 139*   PHOS 2.7 1.7* 1.7*   MG 1.9 1.9 1.8        Recent Labs     12/20/23  1113   PH 7.485*   PCO2 34.7*   PO2 68*   HCO3 26.2   POCSATURATED 95   BE 3*        OXYGEN:  Flow (L/min): 1  Oxygen Concentration (%): 21       MEWS score: 4    charge RNAlfredo  contacted for Tachycardia and increased WBC. CN Reports WBC tending down and HR has been 100-118 bpm. No additional concerns verbalized at this " **See call Dr. Frazier tomorrow: can either see pt on 12/6 at end of clinic day or Wednesday, 12/7 at 830AM  **Please use level 1 transition nipple (Dr. Ackerman's Level 1 nipple) when feeding time. Instructed to call [phone] for further concerns or assistance.    Franny Wiseman RN

## 2023-12-21 NOTE — PLAN OF CARE
Problem: Bariatric Environmental Safety  Goal: Safety Maintained with Care  Outcome: Ongoing, Progressing     Problem: Glycemic Control Impaired (Sepsis/Septic Shock)  Goal: Blood Glucose Level Within Desired Range  Outcome: Ongoing, Progressing     Problem: Infection Progression (Sepsis/Septic Shock)  Goal: Absence of Infection Signs and Symptoms  Outcome: Ongoing, Progressing     Problem: Nutrition Impaired (Sepsis/Septic Shock)  Goal: Optimal Nutrition Intake  Outcome: Ongoing, Progressing     Problem: Fluid and Electrolyte Imbalance (Acute Kidney Injury/Impairment)  Goal: Fluid and Electrolyte Balance  Outcome: Ongoing, Progressing     Problem: Oral Intake Inadequate (Acute Kidney Injury/Impairment)  Goal: Optimal Nutrition Intake  Outcome: Ongoing, Progressing     Problem: Renal Function Impairment (Acute Kidney Injury/Impairment)  Goal: Effective Renal Function  Outcome: Ongoing, Progressing     Problem: Impaired Wound Healing  Goal: Optimal Wound Healing  Outcome: Ongoing, Progressing

## 2023-12-21 NOTE — CONSULTS
[facility] - [facility]surgery ([facility])  Wound Care    Patient Name:  jB Pate Jr.   MRN:  [id number]  Date: 12/21/2023  Diagnosis: Acute osteomyelitis of lumbar spine    History:     Past Medical History:   Diagnosis Date    Acute on chronic systolic heart failure 12/13/2023    Anemia 06/04/2021    Anticoagulant long-term use     Basal ganglia hemorrhage     Left basal ganglia hemorrhage with resultant right-sided hemiparesis which has resolved.     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        Social History     Socioeconomic History    Marital status:     Number of children: 8    Highest education level: 11th grade   Occupational History    Occupation:    Tobacco Use    Smoking status: Never    Smokeless tobacco: Never   Substance and Sexual Activity    Alcohol use: Yes     Alcohol/week: 0.0 standard drinks of alcohol     Comment: occacionally    Drug use: No    Sexual activity: Not Currently     Social Determinants of Health     Financial Resource Strain: Low Risk  (12/12/2023)    Overall Financial Resource Strain (CARDIA)     Difficulty of Paying Living Expenses: Not very hard   Food Insecurity: No Food Insecurity (12/12/2023)    Hunger Vital Sign     Worried About Running Out of Food in the Last Year: Never true     Ran Out of Food in the Last Year: Never true   Transportation Needs: No Transportation Needs (12/12/2023)    PRAPARE -  Transportation     Lack of Transportation (Medical): No     Lack of Transportation (Non-Medical): No   Physical Activity: Inactive (12/12/2023)    Exercise Vital Sign     Days of Exercise per Week: 0 days     Minutes of Exercise per Session: 0 min   Stress: Patient Declined (12/12/2023)    [employer] [place] [employer] - Occupational Stress Questionnaire     Feeling of Stress : Patient declined   Social Connections: Unknown (12/12/2023)    Social Connection and Isolation Panel [NHANES]     Frequency of Communication with Friends and Family: More than three times a week     Frequency of Social Gatherings with Friends and Family: More than three times a week     Attends Confucianist Services: Patient declined     Active Member of Clubs or Organizations: Patient declined     Attends Club or Organization Meetings: Patient declined     Marital Status:    Housing Stability: Unknown (12/12/2023)    Housing Stability Vital Sign     Unable to Pay for Housing in the Last Year: No     Unstable Housing in the Last Year: No       Precautions:     Allergies as of 12/10/2023 - Reviewed 12/10/2023   Allergen Reaction Noted    Tomato (solanum lycopersicum) Hives 09/26/2013    Naproxen Hives 10/22/2012    Shrimp Other (See Comments) 03/07/2017       [facility] Assessment Details/Treatment     Patient seen for wound care consultation for penis.   Reviewed chart for this encounter.   See Flow Sheet for findings.    Pt found lying in bed, agreeable to care at this time. On assessment, pt w/ small area of partial thickness skin loss to top of his penis - mostly scar tissue. Cleansed gently w/ NS, Cavilon applied for moisture barrier protection. Will follow up w/ pt for continued gluteal cleft assessment next week - will assess effectiveness of Cavilon for penis wound as well.    RECOMMENDATIONS:  Daily/before condom cath application - penis - cleanse area gently w/ soap and water, allow to dry and apply Cavilon barrier spray to  affected area.    Discussed POC with patient and primary nurse.   See EMR for orders & patient education.    Bedside nursing to continue care & monitoring.  Bedside nursing to maintain pressure injury prevention interventions.       12/21/23 1045   WOCN Assessment   WOCN Total Time (mins) 15   Visit Date 12/21/23   Visit Time 1045   Consult Type New   WOCN Speciality Wound   Intervention assessed;changed;applied;chart review;coordination of care;orders   Teaching on-going        Altered Skin Integrity 12/21/23 0700 Penis Moisture associated dermatitis   Date First Assessed/Time First Assessed: 12/21/23 0700   Location: Penis  Primary Wound Type: Moisture associated dermatitis   Description of Altered Skin Integrity Partial thickness tissue loss. Shallow open ulcer with a red or pink wound bed, without slough. Intact or Open/Ruptured Serum-filled blister.   Dressing Appearance Open to air   Drainage Amount None   Drainage Characteristics/Odor No odor   Appearance Pink;Red;Moist   Tissue loss description Partial thickness   Periwound Area Intact;Moist   Care Applied:;Skin Barrier

## 2023-12-21 NOTE — NURSING
Nurses Note -- 4 Eyes      12/20/2023  1900 pm      Skin assessed during: Q Shift Change      [] No Altered Skin Integrity Present    []Prevention Measures Documented      [x] Yes- Altered Skin Integrity Present or Discovered   [x] LDA Added if Not in Epic (Describe Wound)   [x] New Altered Skin Integrity was Present on Admit and Documented in LDA   [] Wound Image Taken    Wound Care Consulted? No    Attending Nurse:  Abdulaziz Lam RN/Staff Member: BAILEE Grayson

## 2023-12-21 NOTE — PROGRESS NOTES
Connor Mina - Neurosurgery ([facility])  [facility] Medicine  Progress Note    Patient Name: Bj Pate Jr.  MRN: [id number]  Patient Class: IP- Inpatient   Admission Date: 12/10/2023  Length of Stay: 10 days  Attending Physician: Christ Rudd MD  Primary Care Provider: Jose Antonio Foster MD        Subjective:     Principal Problem:Acute osteomyelitis of lumbar spine        HPI:  This is a 73M with a h/o CVA  about 8 to 10 years ago with R sided deficits, DM, HTN who was BIB from home with family with 3 weeks of abdominal pain. Seen at multiple EDS, and generally unremarkable work up as per family. Within last few days started to have progressive worsening back pain. Became acute altered 2 days ago w/, LE weakness now requiring assistance to walk, as well as urinary incontinence. Patient is A/O 0x at admit. Accompanied by Daughter, most of history from her.     Admission to ED patient was febrile at 102 F, tachycardic 130, tachypneic at 23, hypertensive at 140/72 initially satting well on room air later requiring 2 L nasal cannula.  WBCs elevated 12.74 anemia 11.0 platelets normal, sed rate 120+, BUN 27 magnesium 1.5, albumin 2.4, .1, troponin 0.126 urine trace proteinuria trace ketones lactate initially elevated 2.6 later 1.3 CT head unremarkable for acute processes CT abdomen and pelvis and MRI lumbar spine shows left psoas abscess L2-L3 osteomyleitis, and diskitis.  Neurosurgery was consulted recommendations are pending.  Received Cefepime and Vanc    Overview/Hospital Course:  Mr. Pate was admitted for abdominal pain and worsening mental status, and was found to have a collection in his left psoas muscle, diskitis, and osteomyelitis in the L2-L3 region.  He was placed on vancomycin and meropenem for continuing decline in mental status.  Interventional Radiology took a core biopsy of infected disc, but were unable to aspirate anything from the left psoas collection. Due to his poor mental status, an VITOR  tube and tube feedings were ordered to assist with his nutrition status. On 12/15 he returned to his mental baseline and was alert and having conversations prior to an L1-L4 fusion and decompression with neurosurgery. Was admitted to [facility] for post operative monitoring following a L1-L4 segmental posterolateral fusion, L2-L4 laminectomy and L2-L3 TLIF for L2-L4 discitis/osteomyelitis on 12/15: Went for CTA because of lethargy since surgery. Did not tolerate procedure, returned to [facility]. Sedated and intubated for MRI pan spine, as patient had post surgical changes in exam. Continuing Vanc and Deo. MRI with stable findings except for typical post-operative findings. No further surgical plans per NSGY. Provigil given to improve wakefulness, will start daily. Extubated 11/17/2023. 12/19 PICC consulted placed for long-term IV antibiotic administration. Lasix 40mg IV given for BUE swelling. US extremities ordered to rule out DVT. Showed   superficial thrombophlebitis of the right cephalic vein.      Interval History:  Patient seems intermittently confused.  Afebrile. Denies any CP or SOB.    Review of Systems  Objective:     Vital Signs (Most Recent):  Temp: 98.9 °F (37.2 °C) (12/21/23 1127)  Pulse: (!) 116 (12/21/23 1555)  Resp: 16 (12/21/23 1158)  BP: 119/61 (12/21/23 1127)  SpO2: 97 % (12/21/23 1200) Vital Signs (24h Range):  Temp:  [98.3 °F (36.8 °C)-100.1 °F (37.8 °C)] 98.9 °F (37.2 °C)  Pulse:  [108-119] 116  Resp:  [16-21] 16  SpO2:  [95 %-100 %] 97 %  BP: ()/(56-77) 119/61     Weight: 119.7 kg (264 lb)  Body mass index is 38.99 kg/m².    Intake/Output Summary (Last 24 hours) at 12/21/2023 1557  Last data filed at 12/21/2023 0945  Gross per 24 hour   Intake 1647.04 ml   Output 2200 ml   Net -552.96 ml         Physical Exam      Clear lungs bilaterally, unlabored breathing, does not sustainably follow motor commands with all 4 extremities nor extraocular motions   Does not seem to demonstrate any consistent  lateral ocular deviation   Pupils equal bilaterally   Heart sounds indicate a regular rate and rhythm   3/5 fist  bilaterally  Mild to moderately edematous upper extremities from forearm down to the hands, no obvious erythema noted   Minimal lower extremity edema at best  Does wiggle the toes bilaterally following motor commands here   Oriented to self but not beyond this       Assessment/Plan:      * Acute osteomyelitis of lumbar spine  Psoas abscess  Was admitted to [facility] for post operative monitoring following a L1-L4 segmental posterolateral fusion, L2-L4 laminectomy and L2-L3 TLIF for L2-L4 discitis/osteomyelitis on 12/15.   S/p IR biopsy of psoas abscess and L2-L3 disc space.     -Continue  Meropenem and Vancomycin per ID recs    Hemiparesis  History of R sided hemiparesis from prior CVA. Mostly resolved per family.  PT/OT     Stercoral colitis  CT AP w rectal colitis vs constipation    - brown bomb on 12/14 with large bowel movement  - Aggressive Bowel regimen after surgery    Back pain  Likely secondary to L2-L3 diskitis/ostemyelitis.    MRI spine completed  NSGY consulted. Surgery today for L1-L4 fusion and decompression  Pain meds PRN    Psoas muscle abscess  See severe sepsis    Malnutrition  Maintenance IVFs  NG tube placement ordered  RDN consulted, appreciate recs  Tube feeds held for surgery today    Septic encephalopathy  See acute osteomyelitis of lumbar spine    Acute on chronic systolic heart failure  Echo 12/10/2023    Interpretation Summary    Technically difficult portable study    Left Ventricle: The left ventricle is normal in size. There is concentric remodeling. Mild global hypokinesis present. There is mildly reduced systolic function with a visually estimated ejection fraction of 45 - 50%.    Right Ventricle: Normal right ventricular cavity size. Wall thickness is normal. Right ventricle wall motion  is normal. Systolic function is normal.    Pulmonary Artery: The estimated pulmonary  artery systolic pressure is at least 24 mmHg.    The IVC was not well visualized.    Back on IV lasix today, adding on losartan for GDMT    Discitis  See severe sepsis    Acute metabolic encephalopathy  Septic encephalopathy  ICU delirium  Pain  polypharmacy    Neurology consulted, low suspicion for seizures, possibly gout flare per their assessment but this seems unlikely given his presentation and MRI/CT findings  Placed on provigil due to somnolence    Urinary retention  Rivera placed by ED, PVR now that he has external condom catheter. Due to diskitis/osteomyelitis, concern for possible spinal cord compression.    Bladder scan as needed  Document PVRs    History of CVA (cerebrovascular accident)  -R thalamic, 2016  No acute processes on head CT or brain MRI      VTE Risk Mitigation (From admission, onward)           Ordered     enoxaparin injection 40 mg  Every 24 hours         12/18/23 1018     IP VTE HIGH RISK PATIENT  Once         12/11/23 0652     Place sequential compression device  Until discontinued         12/11/23 0652                    Discharge Planning   GLORIA: 12/22/2023     Code Status: Full Code   Is the patient medically ready for discharge?: Yes    Reason for patient still in hospital (select all that apply): Laboratory test and Pending disposition  Discharge Plan A: Long-term acute care facility (LTAC)   Discharge Delays: None known at this time              Christ Rudd MD  Department of Hospital Medicine   [facility] - Neurosurgery ([facility])

## 2023-12-21 NOTE — SUBJECTIVE & OBJECTIVE
Interval History:  Patient seems intermittently confused.  Afebrile. Denies any CP or SOB.    Review of Systems  Objective:     Vital Signs (Most Recent):  Temp: 98.9 °F (37.2 °C) (12/21/23 1127)  Pulse: (!) 116 (12/21/23 1555)  Resp: 16 (12/21/23 1158)  BP: 119/61 (12/21/23 1127)  SpO2: 97 % (12/21/23 1200) Vital Signs (24h Range):  Temp:  [98.3 °F (36.8 °C)-100.1 °F (37.8 °C)] 98.9 °F (37.2 °C)  Pulse:  [108-119] 116  Resp:  [16-21] 16  SpO2:  [95 %-100 %] 97 %  BP: ()/(56-77) 119/61     Weight: 119.7 kg (264 lb)  Body mass index is 38.99 kg/m².    Intake/Output Summary (Last 24 hours) at 12/21/2023 1557  Last data filed at 12/21/2023 0945  Gross per 24 hour   Intake 1647.04 ml   Output 2200 ml   Net -552.96 ml         Physical Exam      Clear lungs bilaterally, unlabored breathing, does not sustainably follow motor commands with all 4 extremities nor extraocular motions   Does not seem to demonstrate any consistent lateral ocular deviation   Pupils equal bilaterally   Heart sounds indicate a regular rate and rhythm   3/5 fist  bilaterally  Mild to moderately edematous upper extremities from forearm down to the hands, no obvious erythema noted   Minimal lower extremity edema at best  Does wiggle the toes bilaterally following motor commands here   Oriented to self but not beyond this

## 2023-12-21 NOTE — ASSESSMENT & PLAN NOTE
74 yo male with PMH of CVA, CKD3, HTN, DM2, COPD admitted with increasing lethargy s/p fall found to have L psoas abscess/lumbar OM s/p IR aspiration of L2/3 disc space (cx ngtd) and washout with lumbar fusion/TLIF/laminectomy with NSGY on 12/15 - post-op notable for lethargy with transfer to ICU requiring intubation and pressors. Op note with purulent material, cx neg and path in process, so far no growth to date. Post-op MRI revealed stable lumbar spondylodiscitis and L psoas abscess, possible cervical spondylodiscitis and bibasilar consolidations.     WBC uptrending, but afebrile. Incision assessed at bedside, clean, dry, intact. Without swelling or concern for infection. Ultrasound noting right superficial thrombophlebitis of right cephalic vein.       Recommendations:  - continue empiric vancomycin. pharm to dose. Continue meropenem 2g IV 8hr. Anticipate 6-8 wks Iv abx.  - follow up pathology.   - plan reviewed with ID staff. ID will follow.

## 2023-12-21 NOTE — ASSESSMENT & PLAN NOTE
73M CKD3, HTN, DM2, COPD and a prior right thalamic infarct (2016) who is admitted to [facility] for post operative monitoring following a L1-L4 segmental posterolateral fusion, L2-L4 laminectomy and L2-L3 TLIF for L2-L4 discitis/osteomyelitis.     S/p IR biopsy of psoas abscess and L2-L3 disc space. Cultures no growth to date.    -Admitted to [facility]  -VS/Neuro checks q4h  -Completed MAP goals  -Continue Meropenem and Vancomycin per ID recs  -PICC consult placed for long-term antibiotic administration   -Daily CMP, Mag, Phos - replete electrolytes PRN  -PT/OT/SLP as appropriate  -Provigil daily to improve wakefulness  - Remains stable to step down to HM

## 2023-12-21 NOTE — SUBJECTIVE & OBJECTIVE
Interval History:     Afebrile. HDS. More alert. WBC remains elevated, 21k. Complaining of back pain. Incision clean, dry. Continues with loose stools, with flexiseal.     Review of Systems   Constitutional:  Negative for fever.   Respiratory:  Negative for shortness of breath.    Gastrointestinal:  Negative for abdominal distention and abdominal pain.   Genitourinary:  Negative for dysuria.   Musculoskeletal:  Positive for arthralgias and myalgias.   Skin:  Positive for wound.   All other systems reviewed and are negative.    Objective:     Vital Signs (Most Recent):  Temp: 98.5 °F (36.9 °C) (12/21/23 0808)  Pulse: (!) 115 (12/21/23 0808)  Resp: 20 (12/21/23 0808)  BP: (!) 97/56 (12/21/23 0808)  SpO2: 96 % (12/21/23 0808) Vital Signs (24h Range):  Temp:  [98.3 °F (36.8 °C)-100.1 °F (37.8 °C)] 98.5 °F (36.9 °C)  Pulse:  [108-118] 115  Resp:  [16-23] 20  SpO2:  [95 %-100 %] 96 %  BP: ()/(56-77) 97/56     Weight: 119.7 kg (264 lb)  Body mass index is 38.99 kg/m².    Estimated Creatinine Clearance: 108.2 mL/min (based on SCr of 0.8 mg/dL).     Physical Exam  Vitals reviewed.   Constitutional:       General: He is not in acute distress.     Appearance: He is not ill-appearing.   HENT:      Head: Normocephalic and atraumatic.      Comments: NG tube in place  Pulmonary:      Effort: Pulmonary effort is normal. No respiratory distress.      Breath sounds: No stridor.   Abdominal:      General: There is no distension.      Palpations: Abdomen is soft.      Tenderness: There is no abdominal tenderness.   Genitourinary:     Comments: bryant  Musculoskeletal:      Right lower leg: No edema.      Left lower leg: No edema.      Comments: RUE> LUE edematous  Pain with movement and palpation of RUE   Skin:     General: Skin is warm and dry.      Comments: Midline incision clean, dry, intact.           Significant Labs:   Microbiology Results (last 7 days)       Procedure Component Value Units Date/Time    Culture, Anaerobic  [[id number]] Collected: 12/13/23 1703    Order Status: Completed Specimen: Abscess from Buttocks, Left Updated: 12/21/23 0743     Anaerobic Culture No anaerobes isolated    Fungus culture [[id number]] Collected: 12/15/23 1657    Order Status: Completed Specimen: Wound from Back Updated: 12/20/23 1357     Fungus (Mycology) Culture Culture in progress    Narrative:      3.) 2,3 Disc space    Fungus culture [[id number]] Collected: 12/15/23 1603    Order Status: Completed Specimen: Wound from Back Updated: 12/20/23 1357     Fungus (Mycology) Culture Culture in progress    Narrative:      Paraspinal    Fungus culture [[id number]] Collected: 12/15/23 1655    Order Status: Completed Specimen: Wound from Back Updated: 12/20/23 1357     Fungus (Mycology) Culture Culture in progress    Narrative:      2.) 2,3 Disc space    Fungus culture [[id number]] Collected: 12/13/23 1703    Order Status: Completed Specimen: Abscess from Buttocks, Left Updated: 12/20/23 1312     Fungus (Mycology) Culture Culture in progress    Culture, Anaerobe [[id number]] Collected: 12/15/23 1657    Order Status: Completed Specimen: Wound from Back Updated: 12/19/23 0852     Anaerobic Culture Culture in progress    Narrative:      3.) 2,3 Disc space    Culture, Anaerobe [[id number]] Collected: 12/15/23 1655    Order Status: Completed Specimen: Wound from Back Updated: 12/19/23 0851     Anaerobic Culture Culture in progress    Narrative:      2.) 2,3 Disc space    Culture, Anaerobe [[id number]] Collected: 12/15/23 1603    Order Status: Completed Specimen: Wound from Back Updated: 12/19/23 0850     Anaerobic Culture Culture in progress    Narrative:      Paraspinal    AFB Culture & Smear [[id number]] Collected: 12/15/23 1655    Order Status: Completed Specimen: Wound from Back Updated: 12/18/23 1436     AFB Culture & Smear Culture in progress     AFB CULTURE STAIN No acid fast bacilli seen.    Narrative:      2.) 2,3 Disc space    AFB Culture & Smear  [[id number]] Collected: 12/15/23 1657    Order Status: Completed Specimen: Wound from Back Updated: 12/18/23 1436     AFB Culture & Smear Culture in progress     AFB CULTURE STAIN No acid fast bacilli seen.    Narrative:      3.) 2,3 Disc space    AFB Culture & Smear [[id number]] Collected: 12/15/23 1603    Order Status: Completed Specimen: Wound from Back Updated: 12/18/23 1436     AFB Culture & Smear Culture in progress     AFB CULTURE STAIN No acid fast bacilli seen.    Narrative:      Paraspinal    Aerobic culture [[id number]] Collected: 12/15/23 1655    Order Status: Completed Specimen: Wound from Back Updated: 12/18/23 0915     Aerobic Bacterial Culture No growth    Narrative:      2.) 2,3 Disc space    Aerobic culture [[id number]] Collected: 12/15/23 1603    Order Status: Completed Specimen: Wound from Back Updated: 12/18/23 0915     Aerobic Bacterial Culture No growth    Narrative:      Paraspinal    Aerobic culture [[id number]] Collected: 12/15/23 1657    Order Status: Completed Specimen: Wound from Back Updated: 12/18/23 0913     Aerobic Bacterial Culture No growth    Narrative:      3.) 2,3 Disc space    Blood culture [[id number]] Collected: 12/12/23 0033    Order Status: Completed Specimen: Blood Updated: 12/17/23 0612     Blood Culture, Routine No growth after 5 days.    Blood culture [[id number]] Collected: 12/12/23 0033    Order Status: Completed Specimen: Blood Updated: 12/17/23 0612     Blood Culture, Routine No growth after 5 days.    Aerobic culture [[id number]] Collected: 12/13/23 1703    Order Status: Completed Specimen: Abscess from Buttocks, Left Updated: 12/16/23 1109     Aerobic Bacterial Culture No growth    Blood culture x two cultures. Draw prior to antibiotics. [[id number]] Collected: 12/10/23 2007    Order Status: Completed Specimen: Blood from Peripheral, Antecubital, Left Updated: 12/15/23 2212     Blood Culture, Routine No growth after 5 days.    Narrative:      Aerobic and anaerobic     Blood culture x two cultures. Draw prior to antibiotics. [[id number]] Collected: 12/10/23 2007    Order Status: Completed Specimen: Blood from Peripheral, Hand, Left Updated: 12/15/23 2212     Blood Culture, Routine No growth after 5 days.    Narrative:      Aerobic and anaerobic    Gram stain [[id number]] Collected: 12/15/23 1657    Order Status: Completed Specimen: Wound from Back Updated: 12/15/23 1900     Gram Stain Result No WBC's      No organisms seen    Narrative:      3.) 2,3 Disc space    Gram stain [[id number]] Collected: 12/15/23 1655    Order Status: Completed Specimen: Wound from Back Updated: 12/15/23 1857     Gram Stain Result No WBC's      No organisms seen    Narrative:      2.) 2,3 Disc space    Gram stain [[id number]] Collected: 12/15/23 1603    Order Status: Completed Specimen: Wound from Back Updated: 12/15/23 1855     Gram Stain Result No WBC's      No organisms seen    Narrative:      Paraspinal    Culture, Anaerobe [[id number]]     Order Status: No result Specimen: Bone from Back     Aerobic culture [[id number]]     Order Status: No result Specimen: Bone from Back     AFB Culture & Smear [[id number]]     Order Status: No result Specimen: Bone from Back     Gram stain [[id number]]     Order Status: No result Specimen: Bone from Back     Fungus culture [[id number]]     Order Status: No result Specimen: Bone from Back     AFB Culture & Smear [[id number]] Collected: 12/13/23 1703    Order Status: Completed Specimen: Abscess from Buttocks, Left Updated: 12/14/23 2128     AFB Culture & Smear Culture in progress     AFB CULTURE STAIN No acid fast bacilli seen.          Pathology Results  (Last 10 years)                 07/28/23 1133  Specimen to Pathology, Surgery Gastrointestinal tract Final result    Narrative:  Pre-op Diagnosis: Diarrhea, unspecified type [R19.7]   Abnormal finding on GI tract imaging [R93.3]   Procedure(s):   EGD (ESOPHAGOGASTRODUODENOSCOPY)   COLONOSCOPY   Jar #1:  Transverse colon polyp x1   Which provider would you like to cc?->HELEN AWAD   Release to patient->Immediate   Specimen total (fresh, frozen, permanent):->1               Significant Imaging: I have reviewed all pertinent imaging results/findings within the past 24 hours.

## 2023-12-21 NOTE — PLAN OF CARE
Problem: Adult Inpatient Plan of Care  Goal: Plan of Care Review  Outcome: Ongoing, Progressing  Goal: Patient-Specific Goal (Individualized)  Outcome: Ongoing, Progressing  Goal: Absence of Hospital-Acquired Illness or Injury  Outcome: Ongoing, Progressing  Goal: Optimal Comfort and Wellbeing  Outcome: Ongoing, Progressing  Goal: Readiness for Transition of Care  Outcome: Ongoing, Progressing     Problem: Bariatric Environmental Safety  Goal: Safety Maintained with Care  Outcome: Ongoing, Progressing     Problem: Glycemic Control Impaired (Sepsis/Septic Shock)  Goal: Blood Glucose Level Within Desired Range  Outcome: Ongoing, Progressing

## 2023-12-21 NOTE — CARE UPDATE
I have reviewed the chart of Bj Pate Jr. and participated in the care of the patient who is hospitalized for the following:    Active Hospital Problems    Diagnosis    *Acute osteomyelitis of lumbar spine    Thrombophlebitis    Atelectasis    Hypophosphatemia    Intertriginous dermatitis associated with moisture    HILARIO on CPAP    Hemiparesis    Discitis    Acute on chronic systolic heart failure    Septic encephalopathy    Malnutrition     Noted that pt has had a -19% wt change since admit.       Psoas muscle abscess    Back pain    Stercoral colitis    Severe sepsis    Acute metabolic encephalopathy    Urinary retention    Debility    History of CVA (cerebrovascular accident)    Class 2 obesity with body mass index (BMI) of 39.0 to 39.9 in adult          I have reviewed the Bj Pate Jr. with the multidisciplinary team during rounds.      Sarah Cruz NP  Unit Based [facility]

## 2023-12-21 NOTE — SUBJECTIVE & OBJECTIVE
Interval History: NAEON. AF. Continued tachycardia. Awake, alert, oriented to self. Neuro exam stable. Incision c/d/I.     Medications:  Continuous Infusions:  Scheduled Meds:   enoxparin  40 mg Subcutaneous Q24H (prophylaxis, 1700)    fluticasone furoate-vilanteroL  1 puff Inhalation Daily    gabapentin  200 mg Per NG tube TID    meropenem (MERREM) IVPB  2 g Intravenous Q8H    modafiniL  200 mg Per NG tube Before breakfast    psyllium husk (aspartame)  1 packet Per NG tube BID    sodium chloride 0.9%  10 mL Intravenous Q6H     PRN Meds:acetaminophen, dextrose 10%, dextrose 10%, glucagon (human recombinant), insulin aspart U-100, magnesium oxide, magnesium oxide, morphine, ondansetron, potassium bicarbonate, potassium bicarbonate, potassium bicarbonate, potassium, sodium phosphates, potassium, sodium phosphates, potassium, sodium phosphates, Flushing PICC/Midline Protocol **AND** sodium chloride 0.9% **AND** sodium chloride 0.9%, Pharmacy to dose Vancomycin consult **AND** vancomycin - pharmacy to dose     Review of Systems  Objective:     Weight: 119.7 kg (264 lb)  Body mass index is 38.99 kg/m².  Vital Signs (Most Recent):  Temp: 98.9 °F (37.2 °C) (12/21/23 1127)  Pulse: (!) 114 (12/21/23 1158)  Resp: 16 (12/21/23 1158)  BP: 119/61 (12/21/23 1127)  SpO2: 97 % (12/21/23 1200) Vital Signs (24h Range):  Temp:  [98.3 °F (36.8 °C)-100.1 °F (37.8 °C)] 98.9 °F (37.2 °C)  Pulse:  [108-119] 114  Resp:  [16-21] 16  SpO2:  [95 %-100 %] 97 %  BP: ()/(56-77) 119/61     Date 12/21/23 0700 - 12/22/23 0659   Shift 2986-9394 2616-8374 2476-6927 24 Hour Total   INTAKE   NG/GT 1174   1174   IV Piggyback 223   223   Shift Total(mL/kg) 1397(11.7)   1397(11.7)   OUTPUT   Shift Total(mL/kg)       Weight (kg) 119.7 119.7 119.7 119.7                            NG/OG Tube 12/14/23 0800 nasogastric Left nostril (Active)   Placement Check placement verified by x-ray 12/19/23 1902   Tolerance no signs/symptoms of discomfort 12/21/23  0810   Securement secured to nostril center w/ adhesive device 12/21/23 0810   Clamp Status/Tolerance unclamped;no abdominal discomfort;no abdominal distention;no nausea;no emesis;no residual;no restlessness 12/21/23 0810   Suction Setting/Drainage Method suction at the bedside 12/20/23 2000   Insertion Site Appearance no redness, warmth, tenderness, skin breakdown, drainage 12/21/23 0810   Drainage None 12/20/23 2000   Flush/Irrigation flushed w/;water 12/20/23 2000   Feeding Type continuous 12/21/23 0945   Feeding Action feeding continued 12/21/23 0945   Current Rate (mL/hr) 50 mL/hr 12/21/23 0945   Goal Rate (mL/hr) 50 mL/hr 12/21/23 0945   Intake (mL) 60 mL 12/20/23 0701   Water Bolus (mL) 250 mL 12/21/23 0945   Tube Output(mL)(Include Discarded Residual) 0 mL 12/15/23 0006   Formula Name Impact peptide 1.5 12/21/23 0945   Intake (mL) - Formula Tube Feeding 924 12/21/23 0945       Male External Urinary Catheter 12/21/23 1015 Small (Active)   Collection Container Standard drainage bag 12/21/23 1015   Skin no redness;no breakdown;skin barrier applied 12/21/23 1015   Tolerance no signs/symptoms of discomfort 12/21/23 1015   Catheter Change Date 12/21/23 12/21/23 1015   Catheter Change Time 1015 12/21/23 1015            Fecal Incontinence  12/20/23 1332 (Active)   Drainage Method attached to drainage bag 12/21/23 1015   Securement to gravity 12/20/23 2000   Skin reddened;breakdown;cleansed, skin barrier applied 12/21/23 1015   Tolerance no signs/symptoms of discomfort 12/21/23 1015   Stool (mL) 200 mL 12/21/23 0600          Physical Exam     Neurosurgery Physical Exam    General: well developed, well nourished, no distress.   Head: normocephalic, atraumatic  Neurologic: Alert and oriented to self. Conversant.  GCS: Motor: 6/Verbal: 5/Eyes: 4 GCS Total: 15  Mental Status: Awake, Alert, Oriented x 1  Language: No aphasia  Speech: No dysarthria  Cranial nerves: face symmetric, tongue midline, CN II-XII grossly  "intact.   Eyes: pupils equal, round, reactive to light with accomodation, EOMI.   Pulmonary: normal respirations, no signs of respiratory distress  Sensory: intact to light touch throughout  Motor Strength: BUE 2/5 throughout, edematous. BLE 2/5 proximally, 3/5 distally. No abnormal movements seen.    Incision c/d/I with skin edges approximated with staples. No drainage.     Significant Labs:  Recent Labs   Lab 12/20/23  0019 12/21/23  0347   * 139*    142   K 3.9 3.8    105   CO2 26 30*   BUN 41* 50*   CREATININE 0.8 0.8   CALCIUM 8.8 9.1   MG 1.9 1.8     Recent Labs   Lab 12/20/23  0019 12/21/23  0347   WBC 21.56* 21.20*   HGB 7.8* 7.2*   HCT 24.3* 22.5*    432     No results for input(s): "LABPT", "INR", "APTT" in the last 48 hours.  Microbiology Results (last 7 days)       Procedure Component Value Units Date/Time    Culture, Anaerobic [[id number]] Collected: 12/13/23 1703    Order Status: Completed Specimen: Abscess from Buttocks, Left Updated: 12/21/23 0743     Anaerobic Culture No anaerobes isolated    Fungus culture [[id number]] Collected: 12/15/23 1657    Order Status: Completed Specimen: Wound from Back Updated: 12/20/23 1357     Fungus (Mycology) Culture Culture in progress    Narrative:      3.) 2,3 Disc space    Fungus culture [[id number]] Collected: 12/15/23 1603    Order Status: Completed Specimen: Wound from Back Updated: 12/20/23 1357     Fungus (Mycology) Culture Culture in progress    Narrative:      Paraspinal    Fungus culture [[id number]] Collected: 12/15/23 1655    Order Status: Completed Specimen: Wound from Back Updated: 12/20/23 1357     Fungus (Mycology) Culture Culture in progress    Narrative:      2.) 2,3 Disc space    Fungus culture [[id number]] Collected: 12/13/23 1703    Order Status: Completed Specimen: Abscess from Buttocks, Left Updated: 12/20/23 1312     Fungus (Mycology) Culture Culture in progress    Culture, Anaerobe [[id number]] Collected: 12/15/23 " 1657    Order Status: Completed Specimen: Wound from Back Updated: 12/19/23 0852     Anaerobic Culture Culture in progress    Narrative:      3.) 2,3 Disc space    Culture, Anaerobe [[id number]] Collected: 12/15/23 1655    Order Status: Completed Specimen: Wound from Back Updated: 12/19/23 0851     Anaerobic Culture Culture in progress    Narrative:      2.) 2,3 Disc space    Culture, Anaerobe [[id number]] Collected: 12/15/23 1603    Order Status: Completed Specimen: Wound from Back Updated: 12/19/23 0850     Anaerobic Culture Culture in progress    Narrative:      Paraspinal    AFB Culture & Smear [[id number]] Collected: 12/15/23 1655    Order Status: Completed Specimen: Wound from Back Updated: 12/18/23 1436     AFB Culture & Smear Culture in progress     AFB CULTURE STAIN No acid fast bacilli seen.    Narrative:      2.) 2,3 Disc space    AFB Culture & Smear [[id number]] Collected: 12/15/23 1657    Order Status: Completed Specimen: Wound from Back Updated: 12/18/23 1436     AFB Culture & Smear Culture in progress     AFB CULTURE STAIN No acid fast bacilli seen.    Narrative:      3.) 2,3 Disc space    AFB Culture & Smear [[id number]] Collected: 12/15/23 1603    Order Status: Completed Specimen: Wound from Back Updated: 12/18/23 1436     AFB Culture & Smear Culture in progress     AFB CULTURE STAIN No acid fast bacilli seen.    Narrative:      Paraspinal    Aerobic culture [[id number]] Collected: 12/15/23 1655    Order Status: Completed Specimen: Wound from Back Updated: 12/18/23 0915     Aerobic Bacterial Culture No growth    Narrative:      2.) 2,3 Disc space    Aerobic culture [[id number]] Collected: 12/15/23 1603    Order Status: Completed Specimen: Wound from Back Updated: 12/18/23 0915     Aerobic Bacterial Culture No growth    Narrative:      Paraspinal    Aerobic culture [[id number]] Collected: 12/15/23 1657    Order Status: Completed Specimen: Wound from Back Updated: 12/18/23 0913     Aerobic Bacterial  Culture No growth    Narrative:      3.) 2,3 Disc space    Blood culture [[id number]] Collected: 12/12/23 0033    Order Status: Completed Specimen: Blood Updated: 12/17/23 0612     Blood Culture, Routine No growth after 5 days.    Blood culture [[id number]] Collected: 12/12/23 0033    Order Status: Completed Specimen: Blood Updated: 12/17/23 0612     Blood Culture, Routine No growth after 5 days.    Aerobic culture [[id number]] Collected: 12/13/23 1703    Order Status: Completed Specimen: Abscess from Buttocks, Left Updated: 12/16/23 1109     Aerobic Bacterial Culture No growth    Blood culture x two cultures. Draw prior to antibiotics. [[id number]] Collected: 12/10/23 2007    Order Status: Completed Specimen: Blood from Peripheral, Antecubital, Left Updated: 12/15/23 2212     Blood Culture, Routine No growth after 5 days.    Narrative:      Aerobic and anaerobic    Blood culture x two cultures. Draw prior to antibiotics. [[id number]] Collected: 12/10/23 2007    Order Status: Completed Specimen: Blood from Peripheral, Hand, Left Updated: 12/15/23 2212     Blood Culture, Routine No growth after 5 days.    Narrative:      Aerobic and anaerobic    Gram stain [[id number]] Collected: 12/15/23 1657    Order Status: Completed Specimen: Wound from Back Updated: 12/15/23 1900     Gram Stain Result No WBC's      No organisms seen    Narrative:      3.) 2,3 Disc space    Gram stain [[id number]] Collected: 12/15/23 1655    Order Status: Completed Specimen: Wound from Back Updated: 12/15/23 1857     Gram Stain Result No WBC's      No organisms seen    Narrative:      2.) 2,3 Disc space    Gram stain [[id number]] Collected: 12/15/23 1603    Order Status: Completed Specimen: Wound from Back Updated: 12/15/23 1855     Gram Stain Result No WBC's      No organisms seen    Narrative:      Paraspinal    Culture, Anaerobe [[id number]]     Order Status: No result Specimen: Bone from Back     Aerobic culture [[id number]]     Order Status:  No result Specimen: Bone from Back     AFB Culture & Smear [[id number]]     Order Status: No result Specimen: Bone from Back     Gram stain [[id number]]     Order Status: No result Specimen: Bone from Back     Fungus culture [[id number]]     Order Status: No result Specimen: Bone from Back     AFB Culture & Smear [[id number]] Collected: 12/13/23 1703    Order Status: Completed Specimen: Abscess from Buttocks, Left Updated: 12/14/23 2128     AFB Culture & Smear Culture in progress     AFB CULTURE STAIN No acid fast bacilli seen.          Recent Lab Results  (Last 5 results in the past 24 hours)        12/21/23  1342   12/21/23  0811   12/21/23  0347   12/20/23  2135   12/20/23  1808        Albumin     1.3           ALP     270           ALT     356           Anion Gap     7           Aniso     Slight           AST     799           Basophil %     0.0           BILIRUBIN TOTAL     0.6  Comment: For infants and newborns, interpretation of results should be based  on gestational age, weight and in agreement with clinical  observations.    Premature Infant recommended reference ranges:  Up to 24 hours.............<8.0 mg/dL  Up to 48 hours............<12.0 mg/dL  3-5 days..................<15.0 mg/dL  6-29 days.................<15.0 mg/dL             BUN     50           Calcium     9.1           Chloride     105           CO2     30           Creatinine     0.8           Differential Method     Manual           eGFR     >60.0           Eosinophil %     1.0           Glucose     139           Gran %     95.0           Hematocrit     22.5           Hemoglobin     7.2           Immature Grans (Abs)     CANCELED  Comment: Mild elevation in immature granulocytes is non specific and   can be seen in a variety of conditions including stress response,   acute inflammation, trauma and pregnancy. Correlation with other   laboratory and clinical findings is essential.    Result canceled by the ancillary.             Immature  Granulocytes     CANCELED  Comment: Result canceled by the ancillary.           Lymph %     1.0           Magnesium      1.8           MCH     30.5           MCHC     32.0           MCV     95           Mono %     3.0           MPV     11.4           nRBC     0           Phosphorus Level     1.7           Platelet Estimate     Appears normal           Platelet Count     432           POCT Glucose 180   138     176   186       Poikilocytosis     Slight           Potassium     3.8           PROTEIN TOTAL     6.2           RBC     2.36           RDW     15.1           Sodium     142           Target Cells     Occasional           WBC     21.20                                All pertinent labs from the last 24 hours have been reviewed.    Significant Diagnostics:  I have reviewed all pertinent imaging results/findings within the past 24 hours.

## 2023-12-21 NOTE — PROGRESS NOTES
Connor Mina - Neurosurgery ([facility])  Neurosurgery  Progress Note    Subjective:     History of Present Illness: Mr. Pate is a 73M w/ hx CVA, CKD3, HTN, DM2, COPD who presents with increasing lethargy and confusion. Per ED team, patient had 3 weeks of low back pain, with a fall yesterday leading to his presentation. He had subjective lower extremity weakness and urinary retention after the fall. Septic on presentation to ED. Too altered to meaningfully participate in spine motor exam at time of NSGY evaluation.     Post-Op Info:  Procedure(s) (LRB):  L-1 TO L-4 FUSION, SPINE, LUMBAR, TLIF, POSTERIOR APPROACH, USING PEDICLE SCREW (N/A)   6 Days Post-Op   Interval History: NAEON. AF. Continued tachycardia. Awake, alert, oriented to self. Neuro exam stable. Incision c/d/I.     Medications:  Continuous Infusions:  Scheduled Meds:   enoxparin  40 mg Subcutaneous Q24H (prophylaxis, 1700)    fluticasone furoate-vilanteroL  1 puff Inhalation Daily    gabapentin  200 mg Per NG tube TID    meropenem (MERREM) IVPB  2 g Intravenous Q8H    modafiniL  200 mg Per NG tube Before breakfast    psyllium husk (aspartame)  1 packet Per NG tube BID    sodium chloride 0.9%  10 mL Intravenous Q6H     PRN Meds:acetaminophen, dextrose 10%, dextrose 10%, glucagon (human recombinant), insulin aspart U-100, magnesium oxide, magnesium oxide, morphine, ondansetron, potassium bicarbonate, potassium bicarbonate, potassium bicarbonate, potassium, sodium phosphates, potassium, sodium phosphates, potassium, sodium phosphates, Flushing PICC/Midline Protocol **AND** sodium chloride 0.9% **AND** sodium chloride 0.9%, Pharmacy to dose Vancomycin consult **AND** vancomycin - pharmacy to dose     Review of Systems  Objective:     Weight: 119.7 kg (264 lb)  Body mass index is 38.99 kg/m².  Vital Signs (Most Recent):  Temp: 98.9 °F (37.2 °C) (12/21/23 1127)  Pulse: (!) 114 (12/21/23 1158)  Resp: 16 (12/21/23 1158)  BP: 119/61 (12/21/23 1127)  SpO2: 97 %  (12/21/23 1200) Vital Signs (24h Range):  Temp:  [98.3 °F (36.8 °C)-100.1 °F (37.8 °C)] 98.9 °F (37.2 °C)  Pulse:  [108-119] 114  Resp:  [16-21] 16  SpO2:  [95 %-100 %] 97 %  BP: ()/(56-77) 119/61     Date 12/21/23 0700 - 12/22/23 0659   Shift 6361-1705 7487-0730 2474-6513 24 Hour Total   INTAKE   NG/GT 1174   1174   IV Piggyback 223   223   Shift Total(mL/kg) 1397(11.7)   1397(11.7)   OUTPUT   Shift Total(mL/kg)       Weight (kg) 119.7 119.7 119.7 119.7                            NG/OG Tube 12/14/23 0800 nasogastric Left nostril (Active)   Placement Check placement verified by x-ray 12/19/23 1902   Tolerance no signs/symptoms of discomfort 12/21/23 0810   Securement secured to nostril center w/ adhesive device 12/21/23 0810   Clamp Status/Tolerance unclamped;no abdominal discomfort;no abdominal distention;no nausea;no emesis;no residual;no restlessness 12/21/23 0810   Suction Setting/Drainage Method suction at the bedside 12/20/23 2000   Insertion Site Appearance no redness, warmth, tenderness, skin breakdown, drainage 12/21/23 0810   Drainage None 12/20/23 2000   Flush/Irrigation flushed w/;water 12/20/23 2000   Feeding Type continuous 12/21/23 0945   Feeding Action feeding continued 12/21/23 0945   Current Rate (mL/hr) 50 mL/hr 12/21/23 0945   Goal Rate (mL/hr) 50 mL/hr 12/21/23 0945   Intake (mL) 60 mL 12/20/23 0701   Water Bolus (mL) 250 mL 12/21/23 0945   Tube Output(mL)(Include Discarded Residual) 0 mL 12/15/23 0006   Formula Name Impact peptide 1.5 12/21/23 0945   Intake (mL) - Formula Tube Feeding 924 12/21/23 0945       Male External Urinary Catheter 12/21/23 1015 Small (Active)   Collection Container Standard drainage bag 12/21/23 1015   Skin no redness;no breakdown;skin barrier applied 12/21/23 1015   Tolerance no signs/symptoms of discomfort 12/21/23 1015   Catheter Change Date 12/21/23 12/21/23 1015   Catheter Change Time 1015 12/21/23 1015            Fecal Incontinence  12/20/23 1540  "(Active)   Drainage Method attached to drainage bag 12/21/23 1015   Securement to gravity 12/20/23 2000   Skin reddened;breakdown;cleansed, skin barrier applied 12/21/23 1015   Tolerance no signs/symptoms of discomfort 12/21/23 1015   Stool (mL) 200 mL 12/21/23 0600          Physical Exam     Neurosurgery Physical Exam    General: well developed, well nourished, no distress.   Head: normocephalic, atraumatic  Neurologic: Alert and oriented to self. Conversant.  GCS: Motor: 6/Verbal: 5/Eyes: 4 GCS Total: 15  Mental Status: Awake, Alert, Oriented x 1  Language: No aphasia  Speech: No dysarthria  Cranial nerves: face symmetric, tongue midline, CN II-XII grossly intact.   Eyes: pupils equal, round, reactive to light with accomodation, EOMI.   Pulmonary: normal respirations, no signs of respiratory distress  Sensory: intact to light touch throughout  Motor Strength: BUE 2/5 throughout, edematous. BLE 2/5 proximally, 3/5 distally. No abnormal movements seen.    Incision c/d/I with skin edges approximated with staples. No drainage.     Significant Labs:  Recent Labs   Lab 12/20/23  0019 12/21/23  0347   * 139*    142   K 3.9 3.8    105   CO2 26 30*   BUN 41* 50*   CREATININE 0.8 0.8   CALCIUM 8.8 9.1   MG 1.9 1.8     Recent Labs   Lab 12/20/23  0019 12/21/23  0347   WBC 21.56* 21.20*   HGB 7.8* 7.2*   HCT 24.3* 22.5*    432     No results for input(s): "LABPT", "INR", "APTT" in the last 48 hours.  Microbiology Results (last 7 days)       Procedure Component Value Units Date/Time    Culture, Anaerobic [[id number]] Collected: 12/13/23 1703    Order Status: Completed Specimen: Abscess from Buttocks, Left Updated: 12/21/23 0743     Anaerobic Culture No anaerobes isolated    Fungus culture [[id number]] Collected: 12/15/23 1657    Order Status: Completed Specimen: Wound from Back Updated: 12/20/23 1357     Fungus (Mycology) Culture Culture in progress    Narrative:      3.) 2,3 Disc space    Fungus " culture [[id number]] Collected: 12/15/23 1603    Order Status: Completed Specimen: Wound from Back Updated: 12/20/23 1357     Fungus (Mycology) Culture Culture in progress    Narrative:      Paraspinal    Fungus culture [[id number]] Collected: 12/15/23 1655    Order Status: Completed Specimen: Wound from Back Updated: 12/20/23 1357     Fungus (Mycology) Culture Culture in progress    Narrative:      2.) 2,3 Disc space    Fungus culture [[id number]] Collected: 12/13/23 1703    Order Status: Completed Specimen: Abscess from Buttocks, Left Updated: 12/20/23 1312     Fungus (Mycology) Culture Culture in progress    Culture, Anaerobe [[id number]] Collected: 12/15/23 1657    Order Status: Completed Specimen: Wound from Back Updated: 12/19/23 0852     Anaerobic Culture Culture in progress    Narrative:      3.) 2,3 Disc space    Culture, Anaerobe [[id number]] Collected: 12/15/23 1655    Order Status: Completed Specimen: Wound from Back Updated: 12/19/23 0851     Anaerobic Culture Culture in progress    Narrative:      2.) 2,3 Disc space    Culture, Anaerobe [[id number]] Collected: 12/15/23 1603    Order Status: Completed Specimen: Wound from Back Updated: 12/19/23 0850     Anaerobic Culture Culture in progress    Narrative:      Paraspinal    AFB Culture & Smear [[id number]] Collected: 12/15/23 1655    Order Status: Completed Specimen: Wound from Back Updated: 12/18/23 1436     AFB Culture & Smear Culture in progress     AFB CULTURE STAIN No acid fast bacilli seen.    Narrative:      2.) 2,3 Disc space    AFB Culture & Smear [[id number]] Collected: 12/15/23 1657    Order Status: Completed Specimen: Wound from Back Updated: 12/18/23 1436     AFB Culture & Smear Culture in progress     AFB CULTURE STAIN No acid fast bacilli seen.    Narrative:      3.) 2,3 Disc space    AFB Culture & Smear [[id number]] Collected: 12/15/23 1603    Order Status: Completed Specimen: Wound from Back Updated: 12/18/23 1436     AFB Culture & Smear  Culture in progress     AFB CULTURE STAIN No acid fast bacilli seen.    Narrative:      Paraspinal    Aerobic culture [[id number]] Collected: 12/15/23 1655    Order Status: Completed Specimen: Wound from Back Updated: 12/18/23 0915     Aerobic Bacterial Culture No growth    Narrative:      2.) 2,3 Disc space    Aerobic culture [[id number]] Collected: 12/15/23 1603    Order Status: Completed Specimen: Wound from Back Updated: 12/18/23 0915     Aerobic Bacterial Culture No growth    Narrative:      Paraspinal    Aerobic culture [[id number]] Collected: 12/15/23 1657    Order Status: Completed Specimen: Wound from Back Updated: 12/18/23 0913     Aerobic Bacterial Culture No growth    Narrative:      3.) 2,3 Disc space    Blood culture [[id number]] Collected: 12/12/23 0033    Order Status: Completed Specimen: Blood Updated: 12/17/23 0612     Blood Culture, Routine No growth after 5 days.    Blood culture [[id number]] Collected: 12/12/23 0033    Order Status: Completed Specimen: Blood Updated: 12/17/23 0612     Blood Culture, Routine No growth after 5 days.    Aerobic culture [[id number]] Collected: 12/13/23 1703    Order Status: Completed Specimen: Abscess from Buttocks, Left Updated: 12/16/23 1109     Aerobic Bacterial Culture No growth    Blood culture x two cultures. Draw prior to antibiotics. [[id number]] Collected: 12/10/23 2007    Order Status: Completed Specimen: Blood from Peripheral, Antecubital, Left Updated: 12/15/23 2212     Blood Culture, Routine No growth after 5 days.    Narrative:      Aerobic and anaerobic    Blood culture x two cultures. Draw prior to antibiotics. [[id number]] Collected: 12/10/23 2007    Order Status: Completed Specimen: Blood from Peripheral, Hand, Left Updated: 12/15/23 2212     Blood Culture, Routine No growth after 5 days.    Narrative:      Aerobic and anaerobic    Gram stain [[id number]] Collected: 12/15/23 1657    Order Status: Completed Specimen: Wound from Back Updated:  12/15/23 1900     Gram Stain Result No WBC's      No organisms seen    Narrative:      3.) 2,3 Disc space    Gram stain [[id number]] Collected: 12/15/23 1655    Order Status: Completed Specimen: Wound from Back Updated: 12/15/23 1857     Gram Stain Result No WBC's      No organisms seen    Narrative:      2.) 2,3 Disc space    Gram stain [[id number]] Collected: 12/15/23 1603    Order Status: Completed Specimen: Wound from Back Updated: 12/15/23 1855     Gram Stain Result No WBC's      No organisms seen    Narrative:      Paraspinal    Culture, Anaerobe [[id number]]     Order Status: No result Specimen: Bone from Back     Aerobic culture [[id number]]     Order Status: No result Specimen: Bone from Back     AFB Culture & Smear [[id number]]     Order Status: No result Specimen: Bone from Back     Gram stain [[id number]]     Order Status: No result Specimen: Bone from Back     Fungus culture [[id number]]     Order Status: No result Specimen: Bone from Back     AFB Culture & Smear [[id number]] Collected: 12/13/23 1703    Order Status: Completed Specimen: Abscess from Buttocks, Left Updated: 12/14/23 2128     AFB Culture & Smear Culture in progress     AFB CULTURE STAIN No acid fast bacilli seen.          Recent Lab Results  (Last 5 results in the past 24 hours)        12/21/23  1342   12/21/23  0811   12/21/23  0347   12/20/23  2135   12/20/23  1808        Albumin     1.3           ALP     270           ALT     356           Anion Gap     7           Aniso     Slight           AST     799           Basophil %     0.0           BILIRUBIN TOTAL     0.6  Comment: For infants and newborns, interpretation of results should be based  on gestational age, weight and in agreement with clinical  observations.    Premature Infant recommended reference ranges:  Up to 24 hours.............<8.0 mg/dL  Up to 48 hours............<12.0 mg/dL  3-5 days..................<15.0 mg/dL  6-29 days.................<15.0 mg/dL             BUN      50           Calcium     9.1           Chloride     105           CO2     30           Creatinine     0.8           Differential Method     Manual           eGFR     >60.0           Eosinophil %     1.0           Glucose     139           Gran %     95.0           Hematocrit     22.5           Hemoglobin     7.2           Immature Grans (Abs)     CANCELED  Comment: Mild elevation in immature granulocytes is non specific and   can be seen in a variety of conditions including stress response,   acute inflammation, trauma and pregnancy. Correlation with other   laboratory and clinical findings is essential.    Result canceled by the ancillary.             Immature Granulocytes     CANCELED  Comment: Result canceled by the ancillary.           Lymph %     1.0           Magnesium      1.8           MCH     30.5           MCHC     32.0           MCV     95           Mono %     3.0           MPV     11.4           nRBC     0           Phosphorus Level     1.7           Platelet Estimate     Appears normal           Platelet Count     432           POCT Glucose 180   138     176   186       Poikilocytosis     Slight           Potassium     3.8           PROTEIN TOTAL     6.2           RBC     2.36           RDW     15.1           Sodium     142           Target Cells     Occasional           WBC     21.20                                All pertinent labs from the last 24 hours have been reviewed.    Significant Diagnostics:  I have reviewed all pertinent imaging results/findings within the past 24 hours.  Assessment/Plan:     * Acute osteomyelitis of lumbar spine  A 73M w/ hx CVA, CKD3, HTN, DM2, and COPD who presents with AMS likely 2/2 underling sepsis as well as progressive back pain and inability to ambulate for the past 2 weeks due to progressive L2-L3 osteodiscitis and presumed mechanical instability.     CTH 12/11: lacunar infarcts   MRI w/wo L sp 12/11: severe central stenosis L4-5, discitis osteomyelitis at L2-3, L  psoas abscess up to 2 cm  MRI C/T w/wo: very poor with alot of motion artifact, but no OM else where   US BLE 12/12: negative  12/10: , CRP >120  BCx NGTD  MRI brain, Csp, CTH 12/16: nondiagnostic  MRI C/T/L w/wo 12/16 without contribution to picture of fluctuating sensorium and motor exam; severe spondylosis in Csp with some STIR and contrast enhancement prevertebral and possibly early osteodiscitis. MRI Tsp without concerning findings and MRI Lsp with expected postop changes.      Now s/p L1-L4 decompression, L2-L3 TLIF and L1-L4 PSF on 12/15    Subaxial cervical spondylosis which may require PCF in an outpatient setting  Encephalopathy is improving.   Continue multimodal pain medication   Turn every 2 hours to promote wound healing. Keep area clean and dry.   HV and WV removed 12/20   LSO brace when OOB  Intraoperative cultures NGTD, continue abx per ID  DVT ppx   PT/OT and OOB   Obtain post-op XR lumbar spine. Ok to obtain supine.   Rest of care per primary   Please notify neurosurgery on call for any acute neurologic change.            Fern Gomez PA-C  Neurosurgery  Connor Mina - Neurosurgery ([facility])

## 2023-12-21 NOTE — NURSING
Nurses Note -- 4 Eyes      12/20/2023   7:08 PM      Skin assessed during: Transfer      [] No Altered Skin Integrity Present    []Prevention Measures Documented      [x] Yes- Altered Skin Integrity Present or Discovered   [x] LDA Added if Not in Epic (Describe Wound)   [x] New Altered Skin Integrity was Present on Admit and Documented in LDA   [] Wound Image Taken    Wound Care Consulted? No    Attending Nurse:  BAILEE Dawson    Second RN/Staff Member:  BAILEE Murguia

## 2023-12-21 NOTE — ASSESSMENT & PLAN NOTE
A 73M w/ hx CVA, CKD3, HTN, DM2, and COPD who presents with AMS likely 2/2 underling sepsis as well as progressive back pain and inability to ambulate for the past 2 weeks due to progressive L2-L3 osteodiscitis and presumed mechanical instability.     CTH 12/11: lacunar infarcts   MRI w/wo L sp 12/11: severe central stenosis L4-5, discitis osteomyelitis at L2-3, L psoas abscess up to 2 cm  MRI C/T w/wo: very poor with alot of motion artifact, but no OM else where   US BLE 12/12: negative  12/10: , CRP >120  BCx NGTD  MRI brain, Csp, CTH 12/16: nondiagnostic  MRI C/T/L w/wo 12/16 without contribution to picture of fluctuating sensorium and motor exam; severe spondylosis in Csp with some STIR and contrast enhancement prevertebral and possibly early osteodiscitis. MRI Tsp without concerning findings and MRI Lsp with expected postop changes.      Now s/p L1-L4 decompression, L2-L3 TLIF and L1-L4 PSF on 12/15    Subaxial cervical spondylosis which may require PCF in an outpatient setting  Encephalopathy is improving.   Continue multimodal pain medication   Turn every 2 hours to promote wound healing. Keep area clean and dry.   HV and WV removed 12/20   LSO brace when OOB  Intraoperative cultures NGTD, continue abx per ID  DVT ppx   PT/OT and OOB   Obtain post-op XR lumbar spine. Ok to obtain supine.   Rest of care per primary   Please notify neurosurgery on call for any acute neurologic change.

## 2023-12-21 NOTE — ASSESSMENT & PLAN NOTE
72 yo male with PMH of CVA, CKD3, HTN, DM2, COPD admitted with increasing lethargy s/p fall found to have L psoas abscess/lumbar OM s/p IR aspiration of L2/3 disc space (cx ngtd) and washout with lumbar fusion/TLIF/laminectomy with NSGY on 12/15 - post-op notable for lethargy with transfer to ICU requiring intubation and pressors. Op note with purulent material, cx neg and path in process, so far no growth to date. Post-op MRI revealed stable lumbar spondylodiscitis and L psoas abscess, possible cervical spondylodiscitis and bibasilar consolidations.     WBC uptrending, but afebrile. Incision assessed at bedside, clean, dry, intact. Without swelling or concern for infection. Ultrasound noting right superficial thrombophlebitis of right cephalic vein.       Recommendations:  - continue empiric vancomycin. pharm to dose. Continue meropenem 2g IV 8hr. Anticipate 6-8 wks Iv abx.  - follow up pathology.   - plan reviewed with ID staff. ID will follow.

## 2023-12-21 NOTE — PROGRESS NOTES
"[facility] Mina - Neurosurgery ([facility])  Neurocritical Care  Progress Note    Admit Date: 12/10/2023  Service Date: 12/20/2023  Length of Stay: 9    Subjective:     Chief Complaint: Acute osteomyelitis of lumbar spine    History of Present Illness: Bj Pate Jr. is a 73 year old male with a medical history significant for CKD3, HTN, DM2, COPD and a prior right thalamic infarct (2016) who is admitted to [facility] for post operative monitoring following a L1-L4 segmental posterolateral fusion, L2-L4 laminectomy and L2-L3 TLIF. Patient initially admitted to [facility] on 12/10 for evaluation of increasing lethargy, progressive encephalopathy and lower back pain. Work up revealed L2-L4 discitis/osteomyelitis and left psoas abscess. Started on Meropenem and Vancomycin per ID recommendations. S/p IR biopsy of psoas abscess and L2-L3 disc space. Cultures no growth to date. Underwent L1-L4 segmental posterolateral fusion, L2-L4 laminectomy and L2-L3 TLIF with noted purulent material noted at surgical site sent for culture.     On admission to [facility], patient with residual anesthesia effects. Oral airway in place. Opens eyes to voice and follows some basic commands in BLE (wiggles toes). No movement noted in BUE. Per documentation, prior to surgery, patient alert and oriented with GCS 15. Dysarthric and "moves all extremities spontaneously with good tone. BUE 2/5, BLE 2/5 except DF/PF/EHL 3/5."    Hospital Course: 12/16/2023: Went for CTA because of lethargy since surgery. Did not tolerate procedure, returned to [facility]. Sedated and intubated for MRI pan spine, as patient had post surgical changes in exam. Continuing Vanc and Deo.   12/17/2023 MRI completed last night. No further surgical plans per NSGY. Provigil given to improve wakefulness, will start daily. Extubated this afternoon, no issues.  12/18/2023: no longer requires MAP goals  12/19/2023: NAEON. PICC consulted placed for long-term IV antibiotic administration. Remains stable for " transfer to floor with .  12/20/2023: NAEON. Slightly more alert and interactive today. ABG and ammonia ordered given persistent encephalopathy Lasix 40mg IV given for BUE swelling. US extremities ordered to rule out DVT. Remains stable for TTF with .    Interval History:  see hospital course above    Review of Systems   Constitutional:  Negative for chills and fever.   HENT:  Negative for trouble swallowing.    Cardiovascular:  Negative for chest pain.   Gastrointestinal:  Negative for nausea and vomiting.   Neurological:  Positive for weakness. Negative for facial asymmetry, speech difficulty and headaches.   Psychiatric/Behavioral:  Positive for confusion.      Objective:     Vitals:  Temp: 98.3 °F (36.8 °C)  Pulse: (!) 114  Rhythm: sinus tachycardia  BP: 131/74  MAP (mmHg): 80  Resp: 19  SpO2: 98 %    Temp  Min: 98.3 °F (36.8 °C)  Max: 99.8 °F (37.7 °C)  Pulse  Min: 104  Max: 114  BP  Min: 106/60  Max: 144/80  MAP (mmHg)  Min: 77  Max: 105  Resp  Min: 19  Max: 26  SpO2  Min: 91 %  Max: 100 %  Oxygen Concentration (%)  Min: 21  Max: 21    12/19 0701 - 12/20 0700  In: 2644.7   Out: 1174 [Urine:1110; Drains:64]   Unmeasured Output  Urine Occurrence: 1  Stool Occurrence: 1        Physical Exam  Vitals and nursing note reviewed.   Constitutional:       General: He is not in acute distress.     Appearance: Normal appearance. He is obese.      Comments: Well developed. Obese. Resting comfortably in bed.   HENT:      Head: Normocephalic and atraumatic.      Right Ear: External ear normal.      Left Ear: External ear normal.      Nose: Nose normal.      Mouth/Throat:      Mouth: Mucous membranes are moist.      Pharynx: Oropharynx is clear.   Eyes:      General: No scleral icterus.     Extraocular Movements: Extraocular movements intact.      Pupils: Pupils are equal, round, and reactive to light.   Cardiovascular:      Rate and Rhythm: Normal rate and regular rhythm.   Pulmonary:      Effort: Pulmonary effort is  normal. No respiratory distress.   Abdominal:      General: There is no distension.      Comments: Obese abdomen.   Musculoskeletal:      Comments: BUE pain and swelling, particularly in the hands.   Skin:     General: Skin is warm and dry.   Neurological:      Mental Status: He is alert.      Comments: E4V4M6.  Exam somewhat limited secondary to effort. Awake, alert, and oriented to person only. . Disoriented to place, time, and situation. Speech fluent. Follows commands with minimal delay.    PERRL. EOMI. No facial asymmetry. Conversational hearing intact.   Moves all extremities spontaneously. Sensation intact to noxious stimuli in all 4 extremities. Exam somewhat limit due to pain and swelling in extremities.       Gait & coordination exams deferred.        Medications:  Continuous Scheduledenoxparin, 40 mg, Q24H (prophylaxis, 1700)  fluticasone furoate-vilanteroL, 1 puff, Daily  gabapentin, 200 mg, TID  meropenem (MERREM) IVPB, 2 g, Q8H  modafiniL, 200 mg, Before breakfast  mupirocin, , BID  psyllium husk (aspartame), 1 packet, BID  sodium chloride 0.9%, 10 mL, Q6H    PRNacetaminophen, 650 mg, Q6H PRN  dextrose 10%, 12.5 g, PRN  dextrose 10%, 25 g, PRN  glucagon (human recombinant), 1 mg, PRN  insulin aspart U-100, 0-10 Units, QID (AC + HS) PRN  magnesium oxide, 800 mg, PRN  magnesium oxide, 800 mg, PRN  morphine, 2 mg, Q4H PRN  ondansetron, 4 mg, Q8H PRN  potassium bicarbonate, 35 mEq, PRN  potassium bicarbonate, 50 mEq, PRN  potassium bicarbonate, 60 mEq, PRN  potassium, sodium phosphates, 2 packet, PRN  potassium, sodium phosphates, 2 packet, PRN  potassium, sodium phosphates, 2 packet, PRN  sodium chloride 0.9%, 10 mL, PRN  vancomycin - pharmacy to dose, , pharmacy to manage frequency      Today I personally reviewed pertinent medications, lines/drains/airways, imaging, laboratory results, notably:    Laboratory:  CBC:  Recent Labs   Lab 12/20/23  0019   WBC 21.56*   RBC 2.62*   HGB 7.8*   HCT 24.3*   PLT  418   MCV 93   MCH 29.8   MCHC 32.1       CMP:  Recent Labs   Lab 12/20/23  0019   CALCIUM 8.8   ALBUMIN 1.5*   PROT 6.5      K 3.9   CO2 26      BUN 41*   CREATININE 0.8   ALKPHOS 125   ALT 48*   AST 73*   BILITOT 0.5     Recent Labs   Lab 12/20/23  0019   MG 1.9   PHOS 1.7*           Diet  No diet orders on file  No diet orders on file        Assessment/Plan:     Neuro  Acute metabolic encephalopathy  -Exam improved following Provigil  -Neuro checks q4h  -Ammonia, ABG ordered and pending     History of CVA (cerebrovascular accident)  -R thalamic, 2016    Cardiac/Vascular  Acute on chronic systolic heart failure  -EF 45-50%  -Lasix 40mg IV x 1 dose for edema, US extremities ordered to rule out DVTs    Renal/  Urinary retention  - bladder scan Q6    ID  * Acute osteomyelitis of lumbar spine  73M CKD3, HTN, DM2, COPD and a prior right thalamic infarct (2016) who is admitted to [facility] for post operative monitoring following a L1-L4 segmental posterolateral fusion, L2-L4 laminectomy and L2-L3 TLIF for L2-L4 discitis/osteomyelitis.     S/p IR biopsy of psoas abscess and L2-L3 disc space. Cultures no growth to date.    -Admitted to [facility]  -VS/Neuro checks q4h  -Completed MAP goals  -Continue Meropenem and Vancomycin per ID recs  -PICC consult placed for long-term antibiotic administration   -Daily CMP, Mag, Phos - replete electrolytes PRN  -PT/OT/SLP as appropriate  -Provigil daily to improve wakefulness  - Remains stable to step down to     Discitis  -ID following  -See Acute osteomyelitis of lumbar spine     Severe sepsis  -ID following  -See Acute osteomyelitis of lumbar spine     Endocrine  Malnutrition  See Acute osteomyelitis of lumbar spine     Class 2 obesity with body mass index (BMI) of 39.0 to 39.9 in adult  Body mass index is 38.99 kg/m².      GI  Psoas muscle abscess  -S/p IR biopsy  -Treating empirically w/ ABX    Orthopedic  Back pain  -Morphine PRN   -Remaining pain meds discontinued for now  d/t increased somnolence     Other  HILARIO on CPAP  History of,  - CPAP qhs    Debility  PT/OT as tolerated           The patient is being Prophylaxed for:  Venous Thromboembolism with: Mechanical or Chemical  Stress Ulcer with: Not Applicable   Ventilator Pneumonia with: not applicable    Activity Orders            Elevate HOB Elevate (30-45 degrees) Elevate HOB to 30 - 45 degrees during feeding unless otherwise stated starting at 12/13 1828    Turn patient every 2 hours starting at 12/12 0400    Progressive Mobility Protocol (mobilize patient to their highest level of functioning at least twice daily) starting at 12/11 0800          Full Code    Level III    Franny Balderas, PA-C  Neurocritical Care  [facility] - Neurosurgery ([facility])

## 2023-12-21 NOTE — ASSESSMENT & PLAN NOTE
Body mass index is 38.99 kg/m². Morbid obesity complicates all aspects of disease management from diagnostic modalities to treatment. Weight loss encouraged and health benefits explained to patient.

## 2023-12-22 PROBLEM — G93.41 SEPTIC ENCEPHALOPATHY: Status: RESOLVED | Noted: 2023-12-13 | Resolved: 2023-12-22

## 2023-12-22 LAB
ALBUMIN SERPL BCP-MCNC: 1.4 G/DL (ref 3.5–5.2)
ALP SERPL-CCNC: 281 U/L (ref 55–135)
ALT SERPL W/O P-5'-P-CCNC: 296 U/L (ref 10–44)
AMMONIA PLAS-SCNC: 20 UMOL/L (ref 10–50)
ANION GAP SERPL CALC-SCNC: 11 MMOL/L (ref 8–16)
ANISOCYTOSIS BLD QL SMEAR: SLIGHT
AST SERPL-CCNC: 354 U/L (ref 10–40)
BACTERIA SPEC ANAEROBE CULT: NORMAL
BASOPHILS # BLD AUTO: 0.05 K/UL (ref 0–0.2)
BASOPHILS NFR BLD: 0.2 % (ref 0–1.9)
BILIRUB SERPL-MCNC: 0.7 MG/DL (ref 0.1–1)
BUN SERPL-MCNC: 58 MG/DL (ref 8–23)
CALCIUM SERPL-MCNC: 9.6 MG/DL (ref 8.7–10.5)
CHLORIDE SERPL-SCNC: 102 MMOL/L (ref 95–110)
CK SERPL-CCNC: 36 U/L (ref 20–200)
CO2 SERPL-SCNC: 28 MMOL/L (ref 23–29)
CREAT SERPL-MCNC: 1 MG/DL (ref 0.5–1.4)
CRP SERPL-MCNC: 251.5 MG/L (ref 0–8.2)
DIFFERENTIAL METHOD BLD: ABNORMAL
EOSINOPHIL # BLD AUTO: 0.1 K/UL (ref 0–0.5)
EOSINOPHIL NFR BLD: 0.4 % (ref 0–8)
ERYTHROCYTE [DISTWIDTH] IN BLOOD BY AUTOMATED COUNT: 15.3 % (ref 11.5–14.5)
EST. GFR  (NO RACE VARIABLE): >60 ML/MIN/1.73 M^2
FERRITIN SERPL-MCNC: ABNORMAL NG/ML (ref 20–300)
GGT SERPL-CCNC: 143 U/L (ref 8–55)
GGT SERPL-CCNC: 164 U/L (ref 8–55)
GIANT PLATELETS BLD QL SMEAR: PRESENT
GLUCOSE SERPL-MCNC: 127 MG/DL (ref 70–110)
HAV IGM SERPL QL IA: NORMAL
HBV CORE IGM SERPL QL IA: NORMAL
HBV SURFACE AG SERPL QL IA: NORMAL
HCT VFR BLD AUTO: 22.2 % (ref 40–54)
HCV AB SERPL QL IA: NORMAL
HGB BLD-MCNC: 7.3 G/DL (ref 14–18)
IGA SERPL-MCNC: 442 MG/DL (ref 40–350)
IGG SERPL-MCNC: 1557 MG/DL (ref 650–1600)
IGM SERPL-MCNC: 55 MG/DL (ref 50–300)
IMM GRANULOCYTES # BLD AUTO: 1.38 K/UL (ref 0–0.04)
IMM GRANULOCYTES NFR BLD AUTO: 4.7 % (ref 0–0.5)
INFLUENZA A, MOLECULAR: NOT DETECTED
INFLUENZA B, MOLECULAR: NOT DETECTED
INR PPP: 1 (ref 0.8–1.2)
LACTATE SERPL-SCNC: 1.3 MMOL/L (ref 0.5–2.2)
LYMPHOCYTES # BLD AUTO: 1.5 K/UL (ref 1–4.8)
LYMPHOCYTES NFR BLD: 5 % (ref 18–48)
MAGNESIUM SERPL-MCNC: 1.8 MG/DL (ref 1.6–2.6)
MCH RBC QN AUTO: 30 PG (ref 27–31)
MCHC RBC AUTO-ENTMCNC: 32.9 G/DL (ref 32–36)
MCV RBC AUTO: 91 FL (ref 82–98)
MONOCYTES # BLD AUTO: 2.8 K/UL (ref 0.3–1)
MONOCYTES NFR BLD: 9.6 % (ref 4–15)
NEUTROPHILS # BLD AUTO: 23.7 K/UL (ref 1.8–7.7)
NEUTROPHILS NFR BLD: 80.1 % (ref 38–73)
NRBC BLD-RTO: 0 /100 WBC
OVALOCYTES BLD QL SMEAR: ABNORMAL
PHOSPHATE SERPL-MCNC: 2.1 MG/DL (ref 2.7–4.5)
PLATELET # BLD AUTO: 469 K/UL (ref 150–450)
PLATELET BLD QL SMEAR: ABNORMAL
PMV BLD AUTO: 10.8 FL (ref 9.2–12.9)
POCT GLUCOSE: 128 MG/DL (ref 70–110)
POCT GLUCOSE: 138 MG/DL (ref 70–110)
POCT GLUCOSE: 161 MG/DL (ref 70–110)
POCT GLUCOSE: 173 MG/DL (ref 70–110)
POIKILOCYTOSIS BLD QL SMEAR: SLIGHT
POLYCHROMASIA BLD QL SMEAR: ABNORMAL
POTASSIUM SERPL-SCNC: 3.8 MMOL/L (ref 3.5–5.1)
PROT SERPL-MCNC: 6.5 G/DL (ref 6–8.4)
PROTHROMBIN TIME: 11.2 SEC (ref 9–12.5)
RBC # BLD AUTO: 2.43 M/UL (ref 4.6–6.2)
RSV AG BY MOLECULAR METHOD: NOT DETECTED
SARS-COV-2 RNA RESP QL NAA+PROBE: NOT DETECTED
SODIUM SERPL-SCNC: 141 MMOL/L (ref 136–145)
SPHEROCYTES BLD QL SMEAR: ABNORMAL
VANCOMYCIN SERPL-MCNC: 24.6 UG/ML
WBC # BLD AUTO: 29.53 K/UL (ref 3.9–12.7)

## 2023-12-22 PROCEDURE — 63600175 PHARM REV CODE 636 W HCPCS: Performed by: HOSPITALIST

## 2023-12-22 PROCEDURE — 86015 ACTIN ANTIBODY EACH: CPT | Performed by: STUDENT IN AN ORGANIZED HEALTH CARE EDUCATION/TRAINING PROGRAM

## 2023-12-22 PROCEDURE — 84100 ASSAY OF PHOSPHORUS: CPT

## 2023-12-22 PROCEDURE — 99024 POSTOP FOLLOW-UP VISIT: CPT | Mod: ,,,

## 2023-12-22 PROCEDURE — 25000003 PHARM REV CODE 250: Performed by: HOSPITALIST

## 2023-12-22 PROCEDURE — 85025 COMPLETE CBC W/AUTO DIFF WBC: CPT

## 2023-12-22 PROCEDURE — 86038 ANTINUCLEAR ANTIBODIES: CPT | Performed by: STUDENT IN AN ORGANIZED HEALTH CARE EDUCATION/TRAINING PROGRAM

## 2023-12-22 PROCEDURE — 82784 ASSAY IGA/IGD/IGG/IGM EACH: CPT | Mod: 59 | Performed by: STUDENT IN AN ORGANIZED HEALTH CARE EDUCATION/TRAINING PROGRAM

## 2023-12-22 PROCEDURE — 82977 ASSAY OF GGT: CPT | Performed by: STUDENT IN AN ORGANIZED HEALTH CARE EDUCATION/TRAINING PROGRAM

## 2023-12-22 PROCEDURE — 94640 AIRWAY INHALATION TREATMENT: CPT

## 2023-12-22 PROCEDURE — 82103 ALPHA-1-ANTITRYPSIN TOTAL: CPT | Performed by: STUDENT IN AN ORGANIZED HEALTH CARE EDUCATION/TRAINING PROGRAM

## 2023-12-22 PROCEDURE — 86140 C-REACTIVE PROTEIN: CPT | Performed by: HOSPITALIST

## 2023-12-22 PROCEDURE — 0241U SARS-COV2 (COVID) WITH FLU/RSV BY PCR: CPT | Performed by: HOSPITALIST

## 2023-12-22 PROCEDURE — 94660 CPAP INITIATION&MGMT: CPT

## 2023-12-22 PROCEDURE — 86381 MITOCHONDRIAL ANTIBODY EACH: CPT | Performed by: STUDENT IN AN ORGANIZED HEALTH CARE EDUCATION/TRAINING PROGRAM

## 2023-12-22 PROCEDURE — 99233 SBSQ HOSP IP/OBS HIGH 50: CPT | Mod: ,,, | Performed by: REGISTERED NURSE

## 2023-12-22 PROCEDURE — 82140 ASSAY OF AMMONIA: CPT | Performed by: HOSPITALIST

## 2023-12-22 PROCEDURE — 11000001 HC ACUTE MED/SURG PRIVATE ROOM

## 2023-12-22 PROCEDURE — 63600175 PHARM REV CODE 636 W HCPCS

## 2023-12-22 PROCEDURE — 80074 ACUTE HEPATITIS PANEL: CPT | Performed by: HOSPITALIST

## 2023-12-22 PROCEDURE — 25000003 PHARM REV CODE 250: Performed by: PSYCHIATRY & NEUROLOGY

## 2023-12-22 PROCEDURE — 85610 PROTHROMBIN TIME: CPT | Performed by: HOSPITALIST

## 2023-12-22 PROCEDURE — 82977 ASSAY OF GGT: CPT | Mod: 91 | Performed by: HOSPITALIST

## 2023-12-22 PROCEDURE — 83735 ASSAY OF MAGNESIUM: CPT

## 2023-12-22 PROCEDURE — 97530 THERAPEUTIC ACTIVITIES: CPT | Mod: CO

## 2023-12-22 PROCEDURE — 25000003 PHARM REV CODE 250: Performed by: REGISTERED NURSE

## 2023-12-22 PROCEDURE — 83605 ASSAY OF LACTIC ACID: CPT | Performed by: HOSPITALIST

## 2023-12-22 PROCEDURE — 82728 ASSAY OF FERRITIN: CPT | Performed by: STUDENT IN AN ORGANIZED HEALTH CARE EDUCATION/TRAINING PROGRAM

## 2023-12-22 PROCEDURE — A4216 STERILE WATER/SALINE, 10 ML: HCPCS | Performed by: PSYCHIATRY & NEUROLOGY

## 2023-12-22 PROCEDURE — 82550 ASSAY OF CK (CPK): CPT | Performed by: HOSPITALIST

## 2023-12-22 PROCEDURE — 25500020 PHARM REV CODE 255: Performed by: HOSPITALIST

## 2023-12-22 PROCEDURE — 99900035 HC TECH TIME PER 15 MIN (STAT)

## 2023-12-22 PROCEDURE — 80053 COMPREHEN METABOLIC PANEL: CPT

## 2023-12-22 PROCEDURE — 80202 ASSAY OF VANCOMYCIN: CPT | Performed by: HOSPITALIST

## 2023-12-22 PROCEDURE — 97110 THERAPEUTIC EXERCISES: CPT | Mod: CO

## 2023-12-22 PROCEDURE — 25000003 PHARM REV CODE 250

## 2023-12-22 PROCEDURE — 94761 N-INVAS EAR/PLS OXIMETRY MLT: CPT

## 2023-12-22 PROCEDURE — 25000242 PHARM REV CODE 250 ALT 637 W/ HCPCS: Performed by: PSYCHIATRY & NEUROLOGY

## 2023-12-22 RX ORDER — ENOXAPARIN SODIUM 100 MG/ML
30 INJECTION SUBCUTANEOUS EVERY 24 HOURS
Status: DISCONTINUED | OUTPATIENT
Start: 2023-12-22 | End: 2023-12-25

## 2023-12-22 RX ORDER — SODIUM,POTASSIUM PHOSPHATES 280-250MG
2 POWDER IN PACKET (EA) ORAL
Status: ACTIVE | OUTPATIENT
Start: 2023-12-22 | End: 2023-12-22

## 2023-12-22 RX ORDER — SODIUM CHLORIDE 9 MG/ML
INJECTION, SOLUTION INTRAVENOUS CONTINUOUS
Status: DISCONTINUED | OUTPATIENT
Start: 2023-12-22 | End: 2023-12-24

## 2023-12-22 RX ADMIN — Medication 10 ML: at 06:12

## 2023-12-22 RX ADMIN — SODIUM CHLORIDE: 9 INJECTION, SOLUTION INTRAVENOUS at 12:12

## 2023-12-22 RX ADMIN — PSYLLIUM HUSK 1 PACKET: 3.4 POWDER ORAL at 09:12

## 2023-12-22 RX ADMIN — INSULIN ASPART 2 UNITS: 100 INJECTION, SOLUTION INTRAVENOUS; SUBCUTANEOUS at 08:12

## 2023-12-22 RX ADMIN — ENOXAPARIN SODIUM 30 MG: 30 INJECTION SUBCUTANEOUS at 06:12

## 2023-12-22 RX ADMIN — MODAFINIL 200 MG: 100 TABLET ORAL at 05:12

## 2023-12-22 RX ADMIN — INSULIN ASPART 2 UNITS: 100 INJECTION, SOLUTION INTRAVENOUS; SUBCUTANEOUS at 11:12

## 2023-12-22 RX ADMIN — IOHEXOL 100 ML: 350 INJECTION, SOLUTION INTRAVENOUS at 05:12

## 2023-12-22 RX ADMIN — FLUTICASONE FUROATE AND VILANTEROL TRIFENATATE 1 PUFF: 200; 25 POWDER RESPIRATORY (INHALATION) at 09:12

## 2023-12-22 RX ADMIN — ALTEPLASE 2 MG: 2.2 INJECTION, POWDER, LYOPHILIZED, FOR SOLUTION INTRAVENOUS at 11:12

## 2023-12-22 RX ADMIN — Medication 10 ML: at 12:12

## 2023-12-22 RX ADMIN — MEROPENEM 2 G: 1 INJECTION INTRAVENOUS at 02:12

## 2023-12-22 RX ADMIN — MEROPENEM 2 G: 1 INJECTION INTRAVENOUS at 06:12

## 2023-12-22 RX ADMIN — MEROPENEM 2 G: 1 INJECTION INTRAVENOUS at 10:12

## 2023-12-22 RX ADMIN — FUROSEMIDE 40 MG: 10 INJECTION, SOLUTION INTRAMUSCULAR; INTRAVENOUS at 05:12

## 2023-12-22 NOTE — PLAN OF CARE
Problem: Adult Inpatient Plan of Care  Goal: Patient-Specific Goal (Individualized)  Description: Pt. Will maintain sbp below 160  Outcome: Ongoing, Progressing     Problem: Impaired Wound Healing  Goal: Optimal Wound Healing  Outcome: Ongoing, Progressing  Intervention: Promote Wound Healing  Flowsheets (Taken 12/22/2023 0544)  Sleep/Rest Enhancement: awakenings minimized  Activity Management: Rolling - L1  Pain Management Interventions: care clustered     Problem: Skin Injury Risk Increased  Goal: Skin Health and Integrity  Outcome: Ongoing, Progressing     Problem: Fall Injury Risk  Goal: Absence of Fall and Fall-Related Injury  Outcome: Ongoing, Progressing  Intervention: Identify and Manage Contributors  Flowsheets (Taken 12/22/2023 0544)  Self-Care Promotion: independence encouraged  Medication Review/Management: medications reviewed       Lasix given.

## 2023-12-22 NOTE — PT/OT/SLP DISCHARGE
Physical Therapy Discharge Summary    Name: Bj Pate Jr.  MRN: [id number]   Principal Problem: Acute osteomyelitis of lumbar spine     Patient Discharged from acute Physical Therapy on 2023.  Please refer to prior PT noted date on 2023 for functional status.     Assessment:     Patient is not appropriate for acute PT services 2/2 poor mentation, command following, arousal, and participation in therapy to progress toward OOB mobility. Patient is appropriate to be followed by one therapy discipline at this time, so occupational therapy will continue to follow. Please re-consult if cognitive status improves.    Objective:     GOALS:   Multidisciplinary Problems       Physical Therapy Goals          Problem: Physical Therapy    Goal Priority Disciplines Outcome Goal Variances Interventions   Physical Therapy Goal     PT, PT/OT Ongoing, Progressing     Description: Goals to be met by: 2024     Patient will increase functional independence with mobility by performin. Supine to sit with Moderate Assistance  2. Sit to supine with Moderate Assistance  3. Sit to stand transfer with Maximum Assistance  4. Bed to chair transfer with Maximum Assistance using LRAD  5. Sitting at edge of bed x5 minutes with Minimal Assistance  6. Lower extremity exercise program x15 reps per handout, with assistance as needed                         Reasons for Discontinuation of Therapy Services  Patient is unable to continue work toward goals because of medical or psychosocial complications.      Plan:     Patient Discharged to: In house, room 945    2023

## 2023-12-22 NOTE — PROGRESS NOTES
[facility] - Neurosurgery ([facility])  Infectious Disease  Progress Note    Patient Name: Bj Pate Jr.  MRN: [id number]  Admission Date: 12/10/2023  Length of Stay: 11 days  Attending Physician: Christ Rudd MD  Primary Care Provider: Jose Antonio Foster MD    Isolation Status: No active isolations  Assessment/Plan:      ID  * Acute osteomyelitis of lumbar spine  74 yo male with PMH of CVA, CKD3, HTN, DM2, COPD admitted with increasing lethargy s/p fall found to have L psoas abscess/lumbar OM s/p IR aspiration of L2/3 disc space (cx ngtd) and washout with lumbar fusion/TLIF/laminectomy with NSGY on 12/15 - post-op notable for lethargy with transfer to ICU requiring intubation and pressors. Op note with purulent material, cx neg and path in process, so far no growth to date. Post-op MRI revealed stable lumbar spondylodiscitis and L psoas abscess, possible cervical spondylodiscitis and bibasilar consolidations.     WBC uptrending, but afebrile. Incision assessed at bedside, clean, dry, intact. Without swelling or concern for infection. Ultrasound noting right superficial thrombophlebitis of right cephalic vein.       Recommendations:  - continue empiric vancomycin. pharm to dose. Continue meropenem 2g IV 8hr. Anticipate 6-8 wks Iv abx.  - follow up pathology.   - plan reviewed with ID staff. ID will follow.               Thank you for your consult. I will follow-up with patient. Please contact us if you have any additional questions.    Christiano Ashley NP  Infectious Disease  [facility] - Neurosurgery ([facility])    Subjective:     Principal Problem:Acute osteomyelitis of lumbar spine    HPI: Mr. Pate is a 74 yo male with PMH of CVA, CKD3, HTN, DM2, COPD who presents with increasing lethargy and confusion.     Pt remains altered during assessment, history gathered per family at bedside and chart review. Per chart review, pt reported ~3 weeks of low back pain, as well as a fall leading up to presentation.  Following the fall, pt reported lower extremity weakness and urinary retention. Daughter at bedside states that pt has not had any surgical procedures or injections recently, but was treated with pain management in 2019 and had lumbar and cervical injections. Pt is retired, but was a  previously. Family denied him having pets, denied adventurous hobbies, denied raw food intake. Daughter reported recent ER admission for abdominal pain. States pt required medication for a parasite that improved his symptoms. Upon chart review, appears pt presented in August with abdominal pain, CT AP was negative for acute findings. Pt was discharged and followed up with PCP shortly after and stated his symptoms had improved.     On admission, pt was febrile to 102, and tachycardic, with increasing leukocytosis, 13->16k, stable anemia, elevated sed/CRP: >120, 375, procal 1.37. blood cultures negative on admission. MRI lumbar spine noting discitis/osteomyelitis at L2-3 with left psoas abscess (2.2cm), as well as early osteo L3-4, noting difficult study and epidural abscess was difficult to exclude. CT head without evidence of superimposed acute intracranial process. MRI brain, cervical spine, and thoracic spine pending.     NSGY following and recommended IR consult for potential psoas abscess drainage and culture. Planning L2-pelvis decompression and fusion on 12/15/23.  IR planning CT guided aspiration/drainage of psoas abscess on either 12/12 or 12/13.     Pt is currently on vanc and cefepime. ID was consulted for antibiotic recs.       Interval History:     Afebrile. Increasing leukocytosis. More confused today.     Review of Systems   Unable to perform ROS: Patient unresponsive   Skin:  Positive for wound.     Objective:     Vital Signs (Most Recent):  Temp: 98.1 °F (36.7 °C) (12/22/23 1118)  Pulse: 103 (12/22/23 1118)  Resp: 20 (12/22/23 1118)  BP: (!) 95/51 (12/22/23 1118)  SpO2: 95 % (12/22/23 1118) Vital Signs (24h  Range):  Temp:  [97.8 °F (36.6 °C)-98.9 °F (37.2 °C)] 98.1 °F (36.7 °C)  Pulse:  [] 103  Resp:  [16-22] 20  SpO2:  [93 %-98 %] 95 %  BP: ()/(51-78) 95/51     Weight: 119.7 kg (264 lb)  Body mass index is 38.99 kg/m².    Estimated Creatinine Clearance: 86.5 mL/min (based on SCr of 1 mg/dL).     Physical Exam  Vitals reviewed.   Constitutional:       General: He is not in acute distress.     Appearance: He is not ill-appearing.   HENT:      Head: Normocephalic and atraumatic.      Comments: NG tube in place  Pulmonary:      Effort: Pulmonary effort is normal. No respiratory distress.      Breath sounds: No stridor.   Abdominal:      General: There is no distension.      Palpations: Abdomen is soft.      Tenderness: There is no abdominal tenderness.   Genitourinary:     Comments: byrant  Musculoskeletal:      Right lower leg: No edema.      Left lower leg: No edema.      Comments: RUE> LUE edematous  Pain with movement and palpation of RUE   Skin:     General: Skin is warm and dry.      Comments: Midline incision clean, dry, intact.           Significant Labs:   Microbiology Results (last 7 days)       Procedure Component Value Units Date/Time    Culture, Anaerobe [[id number]] Collected: 12/15/23 1657    Order Status: Completed Specimen: Wound from Back Updated: 12/22/23 0739     Anaerobic Culture No anaerobes isolated    Narrative:      3.) 2,3 Disc space    Culture, Anaerobe [[id number]] Collected: 12/15/23 1603    Order Status: Completed Specimen: Wound from Back Updated: 12/22/23 0739     Anaerobic Culture No anaerobes isolated    Narrative:      Paraspinal    Culture, Anaerobe [[id number]] Collected: 12/15/23 1655    Order Status: Completed Specimen: Wound from Back Updated: 12/22/23 0739     Anaerobic Culture No anaerobes isolated    Narrative:      2.) 2,3 Disc space    Culture, Anaerobic [[id number]] Collected: 12/13/23 1703    Order Status: Completed Specimen: Abscess from Buttocks, Left Updated:  12/21/23 0743     Anaerobic Culture No anaerobes isolated    Fungus culture [[id number]] Collected: 12/15/23 1657    Order Status: Completed Specimen: Wound from Back Updated: 12/20/23 1357     Fungus (Mycology) Culture Culture in progress    Narrative:      3.) 2,3 Disc space    Fungus culture [[id number]] Collected: 12/15/23 1603    Order Status: Completed Specimen: Wound from Back Updated: 12/20/23 1357     Fungus (Mycology) Culture Culture in progress    Narrative:      Paraspinal    Fungus culture [[id number]] Collected: 12/15/23 1655    Order Status: Completed Specimen: Wound from Back Updated: 12/20/23 1357     Fungus (Mycology) Culture Culture in progress    Narrative:      2.) 2,3 Disc space    Fungus culture [[id number]] Collected: 12/13/23 1703    Order Status: Completed Specimen: Abscess from Buttocks, Left Updated: 12/20/23 1312     Fungus (Mycology) Culture Culture in progress    AFB Culture & Smear [[id number]] Collected: 12/15/23 1655    Order Status: Completed Specimen: Wound from Back Updated: 12/18/23 1436     AFB Culture & Smear Culture in progress     AFB CULTURE STAIN No acid fast bacilli seen.    Narrative:      2.) 2,3 Disc space    AFB Culture & Smear [[id number]] Collected: 12/15/23 1657    Order Status: Completed Specimen: Wound from Back Updated: 12/18/23 1436     AFB Culture & Smear Culture in progress     AFB CULTURE STAIN No acid fast bacilli seen.    Narrative:      3.) 2,3 Disc space    AFB Culture & Smear [[id number]] Collected: 12/15/23 1603    Order Status: Completed Specimen: Wound from Back Updated: 12/18/23 1436     AFB Culture & Smear Culture in progress     AFB CULTURE STAIN No acid fast bacilli seen.    Narrative:      Paraspinal    Aerobic culture [[id number]] Collected: 12/15/23 1655    Order Status: Completed Specimen: Wound from Back Updated: 12/18/23 0915     Aerobic Bacterial Culture No growth    Narrative:      2.) 2,3 Disc space    Aerobic culture [[id number]]  Collected: 12/15/23 1603    Order Status: Completed Specimen: Wound from Back Updated: 12/18/23 0915     Aerobic Bacterial Culture No growth    Narrative:      Paraspinal    Aerobic culture [[id number]] Collected: 12/15/23 1657    Order Status: Completed Specimen: Wound from Back Updated: 12/18/23 0913     Aerobic Bacterial Culture No growth    Narrative:      3.) 2,3 Disc space    Blood culture [[id number]] Collected: 12/12/23 0033    Order Status: Completed Specimen: Blood Updated: 12/17/23 0612     Blood Culture, Routine No growth after 5 days.    Blood culture [[id number]] Collected: 12/12/23 0033    Order Status: Completed Specimen: Blood Updated: 12/17/23 0612     Blood Culture, Routine No growth after 5 days.    Aerobic culture [[id number]] Collected: 12/13/23 1703    Order Status: Completed Specimen: Abscess from Buttocks, Left Updated: 12/16/23 1109     Aerobic Bacterial Culture No growth    Blood culture x two cultures. Draw prior to antibiotics. [[id number]] Collected: 12/10/23 2007    Order Status: Completed Specimen: Blood from Peripheral, Antecubital, Left Updated: 12/15/23 2212     Blood Culture, Routine No growth after 5 days.    Narrative:      Aerobic and anaerobic    Blood culture x two cultures. Draw prior to antibiotics. [[id number]] Collected: 12/10/23 2007    Order Status: Completed Specimen: Blood from Peripheral, Hand, Left Updated: 12/15/23 2212     Blood Culture, Routine No growth after 5 days.    Narrative:      Aerobic and anaerobic    Gram stain [[id number]] Collected: 12/15/23 1657    Order Status: Completed Specimen: Wound from Back Updated: 12/15/23 1900     Gram Stain Result No WBC's      No organisms seen    Narrative:      3.) 2,3 Disc space    Gram stain [[id number]] Collected: 12/15/23 1655    Order Status: Completed Specimen: Wound from Back Updated: 12/15/23 1857     Gram Stain Result No WBC's      No organisms seen    Narrative:      2.) 2,3 Disc space    Gram stain  [[id number]] Collected: 12/15/23 1603    Order Status: Completed Specimen: Wound from Back Updated: 12/15/23 1855     Gram Stain Result No WBC's      No organisms seen    Narrative:      Paraspinal    Culture, Anaerobe [[id number]]     Order Status: No result Specimen: Bone from Back     Aerobic culture [[id number]]     Order Status: No result Specimen: Bone from Back     AFB Culture & Smear [[id number]]     Order Status: No result Specimen: Bone from Back     Gram stain [[id number]]     Order Status: No result Specimen: Bone from Back     Fungus culture [[id number]]     Order Status: No result Specimen: Bone from Back           Pathology Results  (Last 10 years)                 12/15/23 1735  Specimen to Pathology, Surgery Neurosurgery Final result    Narrative:  Pre-op Diagnosis: Discitis, unspecified spinal region   [M46.40]   Procedure(s):   **LOOP X**FUSION, SPINE, LUMBAR, TLIF, POSTERIOR APPROACH,   USING PEDICLE SCREW   Number of specimens: 2   Name of specimens: 1. Facet Joint- permanent   2. 2, 3 Disc Space-   permanent   Which provider would you like to cc?->NALINI BENTLEY   Release to patient->Immediate   Specimen total (fresh, frozen, permanent):->2       07/28/23 1133  Specimen to Pathology, Surgery Gastrointestinal tract Final result    Narrative:  Pre-op Diagnosis: Diarrhea, unspecified type [R19.7]   Abnormal finding on GI tract imaging [R93.3]   Procedure(s):   EGD (ESOPHAGOGASTRODUODENOSCOPY)   COLONOSCOPY   Jar #1: Transverse colon polyp x1   Which provider would you like to cc?->HELEN AWAD   Release to patient->Immediate   Specimen total (fresh, frozen, permanent):->1               Significant Imaging: I have reviewed all pertinent imaging results/findings within the past 24 hours.

## 2023-12-22 NOTE — NURSING
Nurses Note -- 4 Eyes      12/21/2023   6:46 PM      Skin assessed during: Daily Assessment      [] No Altered Skin Integrity Present    []Prevention Measures Documented      [x] Yes- Altered Skin Integrity Present or Discovered   [] LDA Added if Not in Epic (Describe Wound)   [x] New Altered Skin Integrity was Present on Admit and Documented in LDA   [] Wound Image Taken    Wound Care Consulted? Yes    Attending Nurse:  Deysi Lam RN/Staff Member:  BAILEE Cintron

## 2023-12-22 NOTE — ASSESSMENT & PLAN NOTE
Echo 12/10/2023    Interpretation Summary    Technically difficult portable study    Left Ventricle: The left ventricle is normal in size. There is concentric remodeling. Mild global hypokinesis present. There is mildly reduced systolic function with a visually estimated ejection fraction of 45 - 50%.    Right Ventricle: Normal right ventricular cavity size. Wall thickness is normal. Right ventricle wall motion  is normal. Systolic function is normal.    Pulmonary Artery: The estimated pulmonary artery systolic pressure is at least 24 mmHg.    The IVC was not well visualized.    Lasix and losartan paused in light of acute transaminitis;

## 2023-12-22 NOTE — PROGRESS NOTES
Connor Mina - Neurosurgery ([facility])  [facility] Medicine  Progress Note    Patient Name: Bj Pate Jr.  MRN: [id number]  Patient Class: IP- Inpatient   Admission Date: 12/10/2023  Length of Stay: 11 days  Attending Physician: Christ Rudd MD  Primary Care Provider: Jose Antonio Foster MD        Subjective:     Principal Problem:Acute osteomyelitis of lumbar spine        HPI:  This is a 73M with a h/o CVA  about 8 to 10 years ago with R sided deficits, DM, HTN who was BIB from home with family with 3 weeks of abdominal pain. Seen at multiple EDS, and generally unremarkable work up as per family. Within last few days started to have progressive worsening back pain. Became acute altered 2 days ago w/, LE weakness now requiring assistance to walk, as well as urinary incontinence. Patient is A/O 0x at admit. Accompanied by Daughter, most of history from her.     Admission to ED patient was febrile at 102 F, tachycardic 130, tachypneic at 23, hypertensive at 140/72 initially satting well on room air later requiring 2 L nasal cannula.  WBCs elevated 12.74 anemia 11.0 platelets normal, sed rate 120+, BUN 27 magnesium 1.5, albumin 2.4, .1, troponin 0.126 urine trace proteinuria trace ketones lactate initially elevated 2.6 later 1.3 CT head unremarkable for acute processes CT abdomen and pelvis and MRI lumbar spine shows left psoas abscess L2-L3 osteomyleitis, and diskitis.  Neurosurgery was consulted recommendations are pending.  Received Cefepime and Vanc    Overview/Hospital Course:  Mr. Pate was admitted for abdominal pain and worsening mental status, and was found to have a collection in his left psoas muscle, diskitis, and osteomyelitis in the L2-L3 region.  He was placed on vancomycin and meropenem for continuing decline in mental status.  Interventional Radiology took a core biopsy of infected disc, but were unable to aspirate anything from the left psoas collection. Due to his poor mental status, an VITOR  tube and tube feedings were ordered to assist with his nutrition status. On 12/15 he returned to his mental baseline and was alert and having conversations prior to an L1-L4 fusion and decompression with neurosurgery. Was admitted to [facility] for post operative monitoring following a L1-L4 segmental posterolateral fusion, L2-L4 laminectomy and L2-L3 TLIF for L2-L4 discitis/osteomyelitis on 12/15: Went for CTA because of lethargy since surgery. Did not tolerate procedure, returned to [facility]. Sedated and intubated for MRI pan spine, as patient had post surgical changes in exam. Continuing Vanc and Deo. MRI with stable findings except for typical post-operative findings. No further surgical plans per NSGY. Provigil given to improve wakefulness, will start daily. Extubated 11/17/2023. 12/19 PICC consulted placed for long-term IV antibiotic administration. Lasix 40mg IV given for BUE swelling. US extremities ordered to rule out DVT. Showed   superficial thrombophlebitis of the right cephalic vein.      Interval History: LFTs had bumped, dropping today from 700s to 300s. Pt denies CP; denies any abd pain. Dtr Stacy at the bedside; denies any prior history of liver disease.     Review of Systems  Objective:     Vital Signs (Most Recent):  Temp: 97.8 °F (36.6 °C) (12/22/23 0753)  Pulse: 76 (12/22/23 0935)  Resp: 18 (12/22/23 0935)  BP: (!) 93/55 (12/22/23 0753)  SpO2: (!) 94 % (12/22/23 0753) Vital Signs (24h Range):  Temp:  [97.8 °F (36.6 °C)-98.9 °F (37.2 °C)] 97.8 °F (36.6 °C)  Pulse:  [] 76  Resp:  [16-22] 18  SpO2:  [93 %-98 %] 94 %  BP: ()/(55-78) 93/55     Weight: 119.7 kg (264 lb)  Body mass index is 38.99 kg/m².    Intake/Output Summary (Last 24 hours) at 12/22/2023 1026  Last data filed at 12/22/2023 0920  Gross per 24 hour   Intake 880 ml   Output 500 ml   Net 380 ml         Physical Exam      Appears overall as same as yesterday; responding, partially confused  Clear lungs BL, unlabored breathing, on RA, no  cyanosis  Hrt sounds RRR  AA, intermittently focusing attention on conversation  Abd soft, NT, ND; no jaundice  No LE edema  Mild unchanged BL UE edema  Does follow motor commands, minimally wiggles his toes upon commands; does have weak fist  2/5 BL    Assessment/Plan:      * Acute osteomyelitis of lumbar spine  Psoas abscess  Was admitted to [facility] for post operative monitoring following a L1-L4 segmental posterolateral fusion, L2-L4 laminectomy and L2-L3 TLIF for L2-L4 discitis/osteomyelitis on 12/15.   S/p IR biopsy of psoas abscess and L2-L3 disc space.     -Continue  Meropenem and Vancomycin per ID recs    Discitis  See severe sepsis    Acute metabolic encephalopathy  Septic encephalopathy  delirium  Pain  polypharmacy    Neurology consulted, low suspicion for seizures, possibly gout flare per their assessment but this seems unlikely given his presentation and MRI/CT findings  Placed on provigil due to somnolence    Transaminitis  Acute bump noted  700s --> 300s in 24 hours  Stable bilirubin; no N/V  No obvious DILI per pharm review  Checking Ammonia, hep viral panel, INR, CPK, GGT, US abd, CT abd  IVFs      Acute on chronic systolic heart failure  Echo 12/10/2023    Interpretation Summary    Technically difficult portable study    Left Ventricle: The left ventricle is normal in size. There is concentric remodeling. Mild global hypokinesis present. There is mildly reduced systolic function with a visually estimated ejection fraction of 45 - 50%.    Right Ventricle: Normal right ventricular cavity size. Wall thickness is normal. Right ventricle wall motion  is normal. Systolic function is normal.    Pulmonary Artery: The estimated pulmonary artery systolic pressure is at least 24 mmHg.    The IVC was not well visualized.    Lasix and losartan paused in light of acute transaminitis;     Leukocytosis  Could be related to transaminitis vs infx  F/u abd CT abd  Continue Invanz      Thrombophlebitis  RUE cephalic  vein thrombus      Hemiparesis  History of R sided hemiparesis from prior CVA. Mostly resolved per family.  PT/OT     Stercoral colitis  CT AP w rectal colitis vs constipation    - brown bomb on 12/14 with large bowel movement  - Aggressive Bowel regimen after surgery    Back pain  Likely secondary to L2-L3 diskitis/ostemyelitis.    MRI spine completed  NSGY consulted. Surgery today for L1-L4 fusion and decompression  Pain meds PRN    Psoas muscle abscess  See severe sepsis    Malnutrition  Maintenance IVFs  NG tube placement ordered  RDN consulted, appreciate recs  Tube feeds held for surgery today    Urinary retention  Rivera placed by ED, PVR now that he has external condom catheter. Due to diskitis/osteomyelitis, concern for possible spinal cord compression.    Bladder scan as needed  Document PVRs    History of CVA (cerebrovascular accident)  -R thalamic, 2016  No acute processes on head CT or brain MRI      VTE Risk Mitigation (From admission, onward)           Ordered     enoxaparin injection 30 mg  Every 24 hours         12/22/23 0827     IP VTE HIGH RISK PATIENT  Once         12/11/23 0652     Place sequential compression device  Until discontinued         12/11/23 0652                    Discharge Planning   GLORIA: 12/26/2023     Code Status: Full Code   Is the patient medically ready for discharge?: No    Reason for patient still in hospital (select all that apply): Patient new problem, Patient trending condition, Laboratory test, Treatment, and Imaging  Discharge Plan A: Long-term acute care facility (LTAC)   Discharge Delays: None known at this time              Christ Rdud MD  Department of Hospital Medicine   Connor karin - Neurosurgery ([facility])

## 2023-12-22 NOTE — PLAN OF CARE
12/22/23 1326   Post-Acute Status   Post-Acute Authorization Placement   Post-Acute Placement Status Pending medical clearance/testing   Discharge Plan   Discharge Plan A Long-term acute care facility (LTAC)     Patient has been accepted at [facility] and they have obtained auth.  Patient not med cleared.  When cleared, contact Chrissy at [facility]  ([phone]).       THEY ARE ABLE TO ADMIT ON THE WEEKEND.        Discharge Plan A and Plan B have been determined by review of patient's clinical status, future medical and therapeutic needs, and coverage/benefits for post-acute care in coordination with multidisciplinary team members.    Ana Maria Swift, MARIANA  [facility]  [phone]

## 2023-12-22 NOTE — PT/OT/SLP PROGRESS
Speech Language Pathology      Bj Pate Jr.  MRN: [id number]    SLP attempted to see Patient for therapy. Upon review of Patient with RN, RN confirmed Patient NPO for pending testing and not appropriate for therapy this service day.   Patient not seen today secondary to Patient NPO/RN hold. ST to continue to monitor and re-attempt per ST POC.    12/22/2023

## 2023-12-22 NOTE — PROGRESS NOTES
Connor Mina - Neurosurgery ([facility])  Neurosurgery  Progress Note    Subjective:     History of Present Illness: Mr. Pate is a 73M w/ hx CVA, CKD3, HTN, DM2, COPD who presents with increasing lethargy and confusion. Per ED team, patient had 3 weeks of low back pain, with a fall yesterday leading to his presentation. He had subjective lower extremity weakness and urinary retention after the fall. Septic on presentation to ED. Too altered to meaningfully participate in spine motor exam at time of NSGY evaluation.     Post-Op Info:  Procedure(s) (LRB):  L-1 TO L-4 FUSION, SPINE, LUMBAR, TLIF, POSTERIOR APPROACH, USING PEDICLE SCREW (N/A)   7 Days Post-Op   Interval History: Continued tachycardia, hypotension and increasing WBC. Primary ordering a CT c/a/p w/ IV contrast due to spike in LFTs. Patient is awake and follows simple commands. Ongoing intermittent confusion 2/2 encephalopathy. Extremities are very tender, primary considering gout flare up. Post op XR pending.    Medications:  Continuous Infusions:   sodium chloride 0.9%       Scheduled Meds:   alteplase  2 mg Intra-Catheter Once    enoxparin  30 mg Subcutaneous Q24H (prophylaxis, 1700)    fluticasone furoate-vilanteroL  1 puff Inhalation Daily    meropenem (MERREM) IVPB  2 g Intravenous Q8H    modafiniL  200 mg Per NG tube Before breakfast    potassium, sodium phosphates  2 packet Oral QID (AC & HS)    psyllium husk (aspartame)  1 packet Per NG tube BID    sodium chloride 0.9%  10 mL Intravenous Q6H     PRN Meds:dextrose 10%, dextrose 10%, glucagon (human recombinant), insulin aspart U-100, morphine, ondansetron, Flushing PICC/Midline Protocol **AND** sodium chloride 0.9% **AND** sodium chloride 0.9%, Pharmacy to dose Vancomycin consult **AND** vancomycin - pharmacy to dose     Review of Systems  Objective:     Weight: 119.7 kg (264 lb)  Body mass index is 38.99 kg/m².  Vital Signs (Most Recent):  Temp: 97.8 °F (36.6 °C) (12/22/23 0753)  Pulse: 76 (12/22/23  0935)  Resp: 18 (12/22/23 0935)  BP: (!) 93/55 (12/22/23 0753)  SpO2: (!) 94 % (12/22/23 0753) Vital Signs (24h Range):  Temp:  [97.8 °F (36.6 °C)-98.9 °F (37.2 °C)] 97.8 °F (36.6 °C)  Pulse:  [] 76  Resp:  [16-22] 18  SpO2:  [93 %-98 %] 94 %  BP: ()/(55-78) 93/55     Date 12/22/23 0700 - 12/23/23 0659   Shift 7662-1401 7226-6291 4998-9420 24 Hour Total   INTAKE   NG/   250   Shift Total(mL/kg) 250(2.1)   250(2.1)   OUTPUT   Shift Total(mL/kg)       Weight (kg) 119.7 119.7 119.7 119.7                            NG/OG Tube 12/14/23 0800 nasogastric Left nostril (Active)   Placement Check placement verified by x-ray 12/22/23 0600   Tolerance no signs/symptoms of discomfort 12/22/23 0600   Securement secured to nostril center w/ adhesive device 12/21/23 0810   Clamp Status/Tolerance clamped 12/22/23 0920   Suction Setting/Drainage Method suction at the bedside 12/22/23 0600   Insertion Site Appearance no redness, warmth, tenderness, skin breakdown, drainage 12/22/23 0600   Drainage None 12/22/23 0600   Flush/Irrigation flushed w/;water 12/22/23 0600   Feeding Type continuous 12/22/23 0600   Feeding Action feeding held 12/22/23 0920   Current Rate (mL/hr) 50 mL/hr 12/22/23 0400   Goal Rate (mL/hr) 50 mL/hr 12/22/23 0400   Intake (mL) 80 mL 12/21/23 2100   Water Bolus (mL) 250 mL 12/22/23 0920   Tube Output(mL)(Include Discarded Residual) 0 mL 12/15/23 0006   Formula Name impact peptide 1.5 12/22/23 0600   Intake (mL) - Formula Tube Feeding 50 12/22/23 0500       Male External Urinary Catheter 12/21/23 1015 Small (Active)   Collection Container Standard drainage bag 12/21/23 1015   Skin no breakdown;no redness 12/22/23 0600   Tolerance no signs/symptoms of discomfort 12/22/23 0600   Output (mL) 100 mL 12/22/23 0500   Catheter Change Date 12/21/23 12/21/23 1525   Catheter Change Time 1525 12/21/23 1525            Fecal Incontinence  12/20/23 1332 (Active)   $ Fecal Management Supplies Fecal  "Management System (Supply) 12/21/23 1525   Application fecal incontinence  changed 12/21/23 1525   Drainage Method attached to drainage bag 12/21/23 1525   Securement to gravity 12/20/23 2000   Skin reddened;breakdown;cleansed, skin barrier applied 12/21/23 1525   Tolerance no signs/symptoms of discomfort 12/22/23 0600   Stool (mL) 200 mL 12/21/23 0600       Neurosurgery Physical Exam  General: well developed, well nourished, no distress.   Head: normocephalic, atraumatic  Neurologic: Alert and oriented to self. Conversant.  GCS: Motor: 6/Verbal: 5/Eyes: 4 GCS Total: 15  Mental Status: Awake, Alert, Oriented x 1  Language: No aphasia  Speech: No dysarthria  Cranial nerves: face symmetric, tongue midline, CN II-XII grossly intact.   Eyes: pupils equal, round, reactive to light with accomodation, EOMI.   Pulmonary: normal respirations, no signs of respiratory distress  Sensory: intact to light touch throughout  Motor Strength: BUE 2/5 throughout, edematous, painful to touch. BLE 2/5 proximally, 3/5 distally. No abnormal movements seen.    Incision c/d/I with skin edges approximated with staples. No drainage.     Significant Labs:  Recent Labs   Lab 12/21/23  0347 12/22/23  0433   * 127*    141   K 3.8 3.8    102   CO2 30* 28   BUN 50* 58*   CREATININE 0.8 1.0   CALCIUM 9.1 9.6   MG 1.8 1.8     Recent Labs   Lab 12/21/23  0347 12/22/23  0433   WBC 21.20* 29.53*   HGB 7.2* 7.3*   HCT 22.5* 22.2*    469*     No results for input(s): "LABPT", "INR", "APTT" in the last 48 hours.  Microbiology Results (last 7 days)       Procedure Component Value Units Date/Time    Culture, Anaerobe [[id number]] Collected: 12/15/23 1657    Order Status: Completed Specimen: Wound from Back Updated: 12/22/23 0739     Anaerobic Culture No anaerobes isolated    Narrative:      3.) 2,3 Disc space    Culture, Anaerobe [[id number]] Collected: 12/15/23 4115    Order Status: Completed Specimen: Wound from Back " Updated: 12/22/23 0739     Anaerobic Culture No anaerobes isolated    Narrative:      Paraspinal    Culture, Anaerobe [[id number]] Collected: 12/15/23 1655    Order Status: Completed Specimen: Wound from Back Updated: 12/22/23 0739     Anaerobic Culture No anaerobes isolated    Narrative:      2.) 2,3 Disc space    Culture, Anaerobic [[id number]] Collected: 12/13/23 1703    Order Status: Completed Specimen: Abscess from Buttocks, Left Updated: 12/21/23 0743     Anaerobic Culture No anaerobes isolated    Fungus culture [[id number]] Collected: 12/15/23 1657    Order Status: Completed Specimen: Wound from Back Updated: 12/20/23 1357     Fungus (Mycology) Culture Culture in progress    Narrative:      3.) 2,3 Disc space    Fungus culture [[id number]] Collected: 12/15/23 1603    Order Status: Completed Specimen: Wound from Back Updated: 12/20/23 1357     Fungus (Mycology) Culture Culture in progress    Narrative:      Paraspinal    Fungus culture [[id number]] Collected: 12/15/23 1655    Order Status: Completed Specimen: Wound from Back Updated: 12/20/23 1357     Fungus (Mycology) Culture Culture in progress    Narrative:      2.) 2,3 Disc space    Fungus culture [[id number]] Collected: 12/13/23 1703    Order Status: Completed Specimen: Abscess from Buttocks, Left Updated: 12/20/23 1312     Fungus (Mycology) Culture Culture in progress    AFB Culture & Smear [[id number]] Collected: 12/15/23 1655    Order Status: Completed Specimen: Wound from Back Updated: 12/18/23 1436     AFB Culture & Smear Culture in progress     AFB CULTURE STAIN No acid fast bacilli seen.    Narrative:      2.) 2,3 Disc space    AFB Culture & Smear [[id number]] Collected: 12/15/23 1657    Order Status: Completed Specimen: Wound from Back Updated: 12/18/23 1436     AFB Culture & Smear Culture in progress     AFB CULTURE STAIN No acid fast bacilli seen.    Narrative:      3.) 2,3 Disc space    AFB Culture & Smear [[id number]] Collected: 12/15/23 1603     Order Status: Completed Specimen: Wound from Back Updated: 12/18/23 1436     AFB Culture & Smear Culture in progress     AFB CULTURE STAIN No acid fast bacilli seen.    Narrative:      Paraspinal    Aerobic culture [[id number]] Collected: 12/15/23 1655    Order Status: Completed Specimen: Wound from Back Updated: 12/18/23 0915     Aerobic Bacterial Culture No growth    Narrative:      2.) 2,3 Disc space    Aerobic culture [[id number]] Collected: 12/15/23 1603    Order Status: Completed Specimen: Wound from Back Updated: 12/18/23 0915     Aerobic Bacterial Culture No growth    Narrative:      Paraspinal    Aerobic culture [[id number]] Collected: 12/15/23 1657    Order Status: Completed Specimen: Wound from Back Updated: 12/18/23 0913     Aerobic Bacterial Culture No growth    Narrative:      3.) 2,3 Disc space    Blood culture [[id number]] Collected: 12/12/23 0033    Order Status: Completed Specimen: Blood Updated: 12/17/23 0612     Blood Culture, Routine No growth after 5 days.    Blood culture [[id number]] Collected: 12/12/23 0033    Order Status: Completed Specimen: Blood Updated: 12/17/23 0612     Blood Culture, Routine No growth after 5 days.    Aerobic culture [[id number]] Collected: 12/13/23 1703    Order Status: Completed Specimen: Abscess from Buttocks, Left Updated: 12/16/23 1109     Aerobic Bacterial Culture No growth    Blood culture x two cultures. Draw prior to antibiotics. [[id number]] Collected: 12/10/23 2007    Order Status: Completed Specimen: Blood from Peripheral, Antecubital, Left Updated: 12/15/23 2212     Blood Culture, Routine No growth after 5 days.    Narrative:      Aerobic and anaerobic    Blood culture x two cultures. Draw prior to antibiotics. [[id number]] Collected: 12/10/23 2007    Order Status: Completed Specimen: Blood from Peripheral, Hand, Left Updated: 12/15/23 2212     Blood Culture, Routine No growth after 5 days.    Narrative:      Aerobic and anaerobic    Gram stain  [[id number]] Collected: 12/15/23 1657    Order Status: Completed Specimen: Wound from Back Updated: 12/15/23 1900     Gram Stain Result No WBC's      No organisms seen    Narrative:      3.) 2,3 Disc space    Gram stain [[id number]] Collected: 12/15/23 1655    Order Status: Completed Specimen: Wound from Back Updated: 12/15/23 1857     Gram Stain Result No WBC's      No organisms seen    Narrative:      2.) 2,3 Disc space    Gram stain [[id number]] Collected: 12/15/23 1603    Order Status: Completed Specimen: Wound from Back Updated: 12/15/23 1855     Gram Stain Result No WBC's      No organisms seen    Narrative:      Paraspinal    Culture, Anaerobe [[id number]]     Order Status: No result Specimen: Bone from Back     Aerobic culture [[id number]]     Order Status: No result Specimen: Bone from Back     AFB Culture & Smear [[id number]]     Order Status: No result Specimen: Bone from Back     Gram stain [[id number]]     Order Status: No result Specimen: Bone from Back     Fungus culture [[id number]]     Order Status: No result Specimen: Bone from Back           All pertinent labs from the last 24 hours have been reviewed.    Significant Diagnostics:  I have reviewed and interpreted all pertinent imaging results/findings within the past 24 hours.  Assessment/Plan:     * Acute osteomyelitis of lumbar spine  A 73M w/ hx CVA, CKD3, HTN, DM2, and COPD who presents with AMS likely 2/2 underling sepsis as well as progressive back pain and inability to ambulate for the past 2 weeks due to progressive L2-L3 osteodiscitis and presumed mechanical instability.     CTH 12/11: lacunar infarcts   MRI w/wo L sp 12/11: severe central stenosis L4-5, discitis osteomyelitis at L2-3, L psoas abscess up to 2 cm  MRI C/T w/wo: very poor with alot of motion artifact, but no OM else where   US BLE 12/12: negative  12/10: , CRP >120  BCx NGTD  MRI brain, Csp, CTH 12/16: nondiagnostic  MRI C/T/L w/wo 12/16 without contribution to  picture of fluctuating sensorium and motor exam; severe spondylosis in Csp with some STIR and contrast enhancement prevertebral and possibly early osteodiscitis. MRI Tsp without concerning findings and MRI Lsp with expected postop changes.      Now s/p L1-L4 decompression, L2-L3 TLIF and L1-L4 PSF on 12/15    Subaxial cervical spondylosis which may require PCF in an outpatient setting  Encephalopathy is improving.   Continue multimodal pain medication   Turn every 2 hours to promote wound healing. Keep area clean and dry.   HV and WV removed 12/20   LSO brace when OOB  Intraoperative cultures NGTD, continue abx per ID  DVT ppx   PT/OT and OOB   Obtain post-op XR lumbar spine. Ok to obtain supine.   Rest of care per primary   Please notify neurosurgery on call for any acute neurologic change.            Bri Rangel PA-C  Neurosurgery  Connor Mina - Neurosurgery ([facility])

## 2023-12-22 NOTE — SUBJECTIVE & OBJECTIVE
Interval History: Continued tachycardia, hypotension and increasing WBC. Primary ordering a CT c/a/p w/ IV contrast due to spike in LFTs. Patient is awake and follows simple commands. Ongoing intermittent confusion 2/2 encephalopathy. Extremities are very tender, primary considering gout flare up. Post op XR pending.    Medications:  Continuous Infusions:   sodium chloride 0.9%       Scheduled Meds:   alteplase  2 mg Intra-Catheter Once    enoxparin  30 mg Subcutaneous Q24H (prophylaxis, 1700)    fluticasone furoate-vilanteroL  1 puff Inhalation Daily    meropenem (MERREM) IVPB  2 g Intravenous Q8H    modafiniL  200 mg Per NG tube Before breakfast    potassium, sodium phosphates  2 packet Oral QID (AC & HS)    psyllium husk (aspartame)  1 packet Per NG tube BID    sodium chloride 0.9%  10 mL Intravenous Q6H     PRN Meds:dextrose 10%, dextrose 10%, glucagon (human recombinant), insulin aspart U-100, morphine, ondansetron, Flushing PICC/Midline Protocol **AND** sodium chloride 0.9% **AND** sodium chloride 0.9%, Pharmacy to dose Vancomycin consult **AND** vancomycin - pharmacy to dose     Review of Systems  Objective:     Weight: 119.7 kg (264 lb)  Body mass index is 38.99 kg/m².  Vital Signs (Most Recent):  Temp: 97.8 °F (36.6 °C) (12/22/23 0753)  Pulse: 76 (12/22/23 0935)  Resp: 18 (12/22/23 0935)  BP: (!) 93/55 (12/22/23 0753)  SpO2: (!) 94 % (12/22/23 0753) Vital Signs (24h Range):  Temp:  [97.8 °F (36.6 °C)-98.9 °F (37.2 °C)] 97.8 °F (36.6 °C)  Pulse:  [] 76  Resp:  [16-22] 18  SpO2:  [93 %-98 %] 94 %  BP: ()/(55-78) 93/55     Date 12/22/23 0700 - 12/23/23 0659   Shift 0172-4734 2688-6109 9116-0596 24 Hour Total   INTAKE   NG/   250   Shift Total(mL/kg) 250(2.1)   250(2.1)   OUTPUT   Shift Total(mL/kg)       Weight (kg) 119.7 119.7 119.7 119.7                            NG/OG Tube 12/14/23 0800 nasogastric Left nostril (Active)   Placement Check placement verified by x-ray 12/22/23 0600    Tolerance no signs/symptoms of discomfort 12/22/23 0600   Securement secured to nostril center w/ adhesive device 12/21/23 0810   Clamp Status/Tolerance clamped 12/22/23 0920   Suction Setting/Drainage Method suction at the bedside 12/22/23 0600   Insertion Site Appearance no redness, warmth, tenderness, skin breakdown, drainage 12/22/23 0600   Drainage None 12/22/23 0600   Flush/Irrigation flushed w/;water 12/22/23 0600   Feeding Type continuous 12/22/23 0600   Feeding Action feeding held 12/22/23 0920   Current Rate (mL/hr) 50 mL/hr 12/22/23 0400   Goal Rate (mL/hr) 50 mL/hr 12/22/23 0400   Intake (mL) 80 mL 12/21/23 2100   Water Bolus (mL) 250 mL 12/22/23 0920   Tube Output(mL)(Include Discarded Residual) 0 mL 12/15/23 0006   Formula Name impact peptide 1.5 12/22/23 0600   Intake (mL) - Formula Tube Feeding 50 12/22/23 0500       Male External Urinary Catheter 12/21/23 1015 Small (Active)   Collection Container Standard drainage bag 12/21/23 1015   Skin no breakdown;no redness 12/22/23 0600   Tolerance no signs/symptoms of discomfort 12/22/23 0600   Output (mL) 100 mL 12/22/23 0500   Catheter Change Date 12/21/23 12/21/23 1525   Catheter Change Time 1525 12/21/23 1525            Fecal Incontinence  12/20/23 1332 (Active)   $ Fecal Management Supplies Fecal Management System (Supply) 12/21/23 1525   Application fecal incontinence  changed 12/21/23 1525   Drainage Method attached to drainage bag 12/21/23 1525   Securement to gravity 12/20/23 2000   Skin reddened;breakdown;cleansed, skin barrier applied 12/21/23 1525   Tolerance no signs/symptoms of discomfort 12/22/23 0600   Stool (mL) 200 mL 12/21/23 0600       Neurosurgery Physical Exam  General: well developed, well nourished, no distress.   Head: normocephalic, atraumatic  Neurologic: Alert and oriented to self. Conversant.  GCS: Motor: 6/Verbal: 5/Eyes: 4 GCS Total: 15  Mental Status: Awake, Alert, Oriented x 1  Language: No  "aphasia  Speech: No dysarthria  Cranial nerves: face symmetric, tongue midline, CN II-XII grossly intact.   Eyes: pupils equal, round, reactive to light with accomodation, EOMI.   Pulmonary: normal respirations, no signs of respiratory distress  Sensory: intact to light touch throughout  Motor Strength: BUE 2/5 throughout, edematous, painful to touch. BLE 2/5 proximally, 3/5 distally. No abnormal movements seen.    Incision c/d/I with skin edges approximated with staples. No drainage.     Significant Labs:  Recent Labs   Lab 12/21/23 0347 12/22/23  0433   * 127*    141   K 3.8 3.8    102   CO2 30* 28   BUN 50* 58*   CREATININE 0.8 1.0   CALCIUM 9.1 9.6   MG 1.8 1.8     Recent Labs   Lab 12/21/23 0347 12/22/23 0433   WBC 21.20* 29.53*   HGB 7.2* 7.3*   HCT 22.5* 22.2*    469*     No results for input(s): "LABPT", "INR", "APTT" in the last 48 hours.  Microbiology Results (last 7 days)       Procedure Component Value Units Date/Time    Culture, Anaerobe [[id number]] Collected: 12/15/23 1657    Order Status: Completed Specimen: Wound from Back Updated: 12/22/23 0739     Anaerobic Culture No anaerobes isolated    Narrative:      3.) 2,3 Disc space    Culture, Anaerobe [[id number]] Collected: 12/15/23 1603    Order Status: Completed Specimen: Wound from Back Updated: 12/22/23 0739     Anaerobic Culture No anaerobes isolated    Narrative:      Paraspinal    Culture, Anaerobe [[id number]] Collected: 12/15/23 1655    Order Status: Completed Specimen: Wound from Back Updated: 12/22/23 0739     Anaerobic Culture No anaerobes isolated    Narrative:      2.) 2,3 Disc space    Culture, Anaerobic [[id number]] Collected: 12/13/23 1703    Order Status: Completed Specimen: Abscess from Buttocks, Left Updated: 12/21/23 0743     Anaerobic Culture No anaerobes isolated    Fungus culture [[id number]] Collected: 12/15/23 1657    Order Status: Completed Specimen: Wound from Back Updated: 12/20/23 1357     " Fungus (Mycology) Culture Culture in progress    Narrative:      3.) 2,3 Disc space    Fungus culture [[id number]] Collected: 12/15/23 1603    Order Status: Completed Specimen: Wound from Back Updated: 12/20/23 1357     Fungus (Mycology) Culture Culture in progress    Narrative:      Paraspinal    Fungus culture [[id number]] Collected: 12/15/23 1655    Order Status: Completed Specimen: Wound from Back Updated: 12/20/23 1357     Fungus (Mycology) Culture Culture in progress    Narrative:      2.) 2,3 Disc space    Fungus culture [[id number]] Collected: 12/13/23 1703    Order Status: Completed Specimen: Abscess from Buttocks, Left Updated: 12/20/23 1312     Fungus (Mycology) Culture Culture in progress    AFB Culture & Smear [[id number]] Collected: 12/15/23 1655    Order Status: Completed Specimen: Wound from Back Updated: 12/18/23 1436     AFB Culture & Smear Culture in progress     AFB CULTURE STAIN No acid fast bacilli seen.    Narrative:      2.) 2,3 Disc space    AFB Culture & Smear [[id number]] Collected: 12/15/23 1657    Order Status: Completed Specimen: Wound from Back Updated: 12/18/23 1436     AFB Culture & Smear Culture in progress     AFB CULTURE STAIN No acid fast bacilli seen.    Narrative:      3.) 2,3 Disc space    AFB Culture & Smear [[id number]] Collected: 12/15/23 1603    Order Status: Completed Specimen: Wound from Back Updated: 12/18/23 1436     AFB Culture & Smear Culture in progress     AFB CULTURE STAIN No acid fast bacilli seen.    Narrative:      Paraspinal    Aerobic culture [[id number]] Collected: 12/15/23 1655    Order Status: Completed Specimen: Wound from Back Updated: 12/18/23 0915     Aerobic Bacterial Culture No growth    Narrative:      2.) 2,3 Disc space    Aerobic culture [[id number]] Collected: 12/15/23 1603    Order Status: Completed Specimen: Wound from Back Updated: 12/18/23 0915     Aerobic Bacterial Culture No growth    Narrative:      Paraspinal    Aerobic culture  [[id number]] Collected: 12/15/23 1657    Order Status: Completed Specimen: Wound from Back Updated: 12/18/23 0913     Aerobic Bacterial Culture No growth    Narrative:      3.) 2,3 Disc space    Blood culture [[id number]] Collected: 12/12/23 0033    Order Status: Completed Specimen: Blood Updated: 12/17/23 0612     Blood Culture, Routine No growth after 5 days.    Blood culture [[id number]] Collected: 12/12/23 0033    Order Status: Completed Specimen: Blood Updated: 12/17/23 0612     Blood Culture, Routine No growth after 5 days.    Aerobic culture [[id number]] Collected: 12/13/23 1703    Order Status: Completed Specimen: Abscess from Buttocks, Left Updated: 12/16/23 1109     Aerobic Bacterial Culture No growth    Blood culture x two cultures. Draw prior to antibiotics. [[id number]] Collected: 12/10/23 2007    Order Status: Completed Specimen: Blood from Peripheral, Antecubital, Left Updated: 12/15/23 2212     Blood Culture, Routine No growth after 5 days.    Narrative:      Aerobic and anaerobic    Blood culture x two cultures. Draw prior to antibiotics. [[id number]] Collected: 12/10/23 2007    Order Status: Completed Specimen: Blood from Peripheral, Hand, Left Updated: 12/15/23 2212     Blood Culture, Routine No growth after 5 days.    Narrative:      Aerobic and anaerobic    Gram stain [[id number]] Collected: 12/15/23 1657    Order Status: Completed Specimen: Wound from Back Updated: 12/15/23 1900     Gram Stain Result No WBC's      No organisms seen    Narrative:      3.) 2,3 Disc space    Gram stain [[id number]] Collected: 12/15/23 1655    Order Status: Completed Specimen: Wound from Back Updated: 12/15/23 1857     Gram Stain Result No WBC's      No organisms seen    Narrative:      2.) 2,3 Disc space    Gram stain [[id number]] Collected: 12/15/23 1603    Order Status: Completed Specimen: Wound from Back Updated: 12/15/23 1855     Gram Stain Result No WBC's      No organisms seen    Narrative:      Paraspinal     Culture, Anaerobe [[id number]]     Order Status: No result Specimen: Bone from Back     Aerobic culture [[id number]]     Order Status: No result Specimen: Bone from Back     AFB Culture & Smear [[id number]]     Order Status: No result Specimen: Bone from Back     Gram stain [[id number]]     Order Status: No result Specimen: Bone from Back     Fungus culture [[id number]]     Order Status: No result Specimen: Bone from Back           All pertinent labs from the last 24 hours have been reviewed.    Significant Diagnostics:  I have reviewed and interpreted all pertinent imaging results/findings within the past 24 hours.   normal sinus rhythm

## 2023-12-22 NOTE — PT/OT/SLP PROGRESS
"Occupational Therapy   Treatment    Name: Bj Pate Jr.  MRN: [id number]  Admitting Diagnosis:  Acute osteomyelitis of lumbar spine  7 Days Post-Op  Procedure(s):  L-1 TO L-4 FUSION, SPINE, LUMBAR, TLIF, POSTERIOR APPROACH, USING PEDICLE SCREW   Recommendations:     Discharge Recommendations: Moderate Intensity Therapy  Discharge Equipment Recommendations:  lift device, hospital bed, wheelchair  Barriers to discharge:   (increased skilled assistance for ADLs and mobility)    Assessment:     Bj Pate Jr. is a 73 y.o. male with a medical diagnosis of Acute osteomyelitis of lumbar spine.  He presents with the following performance deficits affecting function are weakness, impaired endurance, impaired self care skills, impaired functional mobility, gait instability, impaired balance, decreased lower extremity function, edema, pain, decreased upper extremity function, impaired fine motor, decreased coordination, decreased ROM, impaired joint extensibility, impaired muscle length, abnormal tone, impaired cognition, impaired coordination. Patient received alert and oriented to self. Patient continues to requires significant assistance  during bed mobility and unable to follow one step commands consistently. Patient continues to demonstrate the need for moderate intensity therapy on a daily basis post acute exhibited by decreased independence with self-care and functional mobility    Rehab Prognosis:  Fair; patient would benefit from acute skilled OT services to address these deficits and reach maximum level of function.       Plan:     Patient to be seen 3 x/week to address the above listed problems via self-care/home management, therapeutic activities, neuromuscular re-education, therapeutic exercises  Plan of Care Expires: 01/19/24  Plan of Care Reviewed with: patient, family    Subjective     Chief Complaint: "Ouch! You're hurting me." Re: gentle, progressive PROM B hands  Patient/Family Comments/goals: improve " with therapy  Pain/Comfort:  Pain Rating 1:  (unable to rate; grimaced during mobility)  Location - Side 1: Bilateral  Location - Orientation 1: generalized  Location 1: hand  Pain Addressed 1: Reposition, Distraction  Pain Rating Post-Intervention 1:  (no indication of pain at rest)    Objective:     Communicated with: nurse zayra and OTR prior to session.  Patient found HOB elevated with blood pressure cuff, peripheral IV, Condom Catheter, telemetry, NG tube upon OT entry to room.  A client care conference was completed by the OTR and the HOUSE prior to treatment by the HOUSE to discuss the patient's POC and current status.    General Precautions: Standard, aspiration, fall    Orthopedic Precautions:spinal precautions  Braces: LSO  Respiratory Status: Room air     Occupational Performance:       [facility] ADL: 6    Treatment & Education:  Patient participated in gentle, progressive PROM of B LE/UE in all available planes with focus on maintaining joint and skin integrity as well as to promote tone normalization and improved command following. Edema in B hands noted with resistive tone noted initially to LE/UE.   Patient's family educated on PROM and spinal precautions. Instructed to call nursing for mobility (A).   Patient/family educated on OT POC, goals, current progress, and positioning techniques for maintaining skin/joint integrity.   Addressed all patient needs within HOUSE scope of practice.     Patient left HOB elevated with all lines intact, call button in reach, nurse notified, and family present    GOALS:   Multidisciplinary Problems       Occupational Therapy Goals          Problem: Occupational Therapy    Goal Priority Disciplines Outcome Interventions   Occupational Therapy Goal     OT, PT/OT Ongoing, Progressing    Description: Goals to be met by: 1/19/24     Patient will increase functional independence with ADLs by performing:    UE Dressing with Moderate Assistance.  LE Dressing with Maximum  Assistance.  Grooming while seated with Moderate Assistance.  Toileting from bedside commode with Maximum Assistance for hygiene and clothing management.   Step transfer with Moderate Assistance  Toilet transfer to bedside commode with Moderate Assistance.                         Time Tracking:     OT Date of Treatment: 12/22/23  OT Start Time: 1449  OT Stop Time: 1515  OT Total Time (min): 26 min    Billable Minutes:Therapeutic Activity 10  Therapeutic Exercise 16    OT/АЛЕКСАНДР: АЛЕКСАНДР     Number of АЛЕКСАНДР visits since last OT visit: 1    12/22/2023

## 2023-12-22 NOTE — CARE UPDATE
"RAPID RESPONSE NURSE CHART REVIEW        Chart Reviewed: 12/22/2023, 10:37 AM    MRN: [id number]  Bed: 945/945 A    Dx: Acute osteomyelitis of lumbar spine    Bj Pate Jr. has a past medical history of Acute on chronic systolic heart failure, Anemia, Anticoagulant long-term use, Basal ganglia hemorrhage, Benign hypertension with CKD (chronic kidney disease) stage III, Cataract, Chronic idiopathic gout of multiple sites, Chronic kidney disease, stage 3, COPD (chronic obstructive pulmonary disease), Erectile dysfunction, Gout, Hemorrhoids without complication, Hyperlipidemia, Morbid obesity, Obstructive sleep apnea on CPAP, Reactive airway disease without complication, Severe sepsis, Stroke, Thalamic infarct, acute (right), and 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    Last VS: BP (!) 93/55 (BP Location: Right arm, Patient Position: Lying)   Pulse 76   Temp 97.8 °F (36.6 °C) (Oral)   Resp 18   Ht 5' 9" (1.753 m)   Wt 119.7 kg (264 lb)   SpO2 (!) 94%   BMI 38.99 kg/m²     24H Vital Sign Range:  Temp:  [97.8 °F (36.6 °C)-98.9 °F (37.2 °C)]   Pulse:  []   Resp:  [16-22]   BP: ()/(55-78)   SpO2:  [93 %-98 %]     Level of Consciousness (AVPU): alert    Recent Labs     12/20/23  0019 12/21/23  0347 12/22/23  0433   WBC 21.56* 21.20* 29.53*   HGB 7.8* 7.2* 7.3*   HCT 24.3* 22.5* 22.2*    432 469*       Recent Labs     12/20/23  0019 12/21/23  0347 12/22/23  0433    142 141   K 3.9 3.8 3.8    105 102   CO2 26 30* 28   BUN 41* 50* 58*   CREATININE 0.8 0.8 1.0   * 139* 127*   PHOS 1.7* 1.7* 2.1*   MG 1.9 1.8 1.8        Recent Labs     12/20/23  1113   PH 7.485*   PCO2 34.7*   PO2 68*   HCO3 26.2   POCSATURATED 95   BE 3*        OXYGEN:  Flow (L/min): 1  Oxygen Concentration (%): 21       MEWS score: 5    Rounding completed with charge BAILEE Oscar and LAMBERTO Cruz NP. Discussed ongoing tachycardia and increase in WBC. No additional concerns verbalized at this time. Instructed to " call [phone] for further concerns or assistance.    Shira Miller RN

## 2023-12-22 NOTE — SUBJECTIVE & OBJECTIVE
Interval History:     Afebrile. Increasing leukocytosis. More confused today.     Review of Systems   Unable to perform ROS: Patient unresponsive   Skin:  Positive for wound.     Objective:     Vital Signs (Most Recent):  Temp: 98.1 °F (36.7 °C) (12/22/23 1118)  Pulse: 103 (12/22/23 1118)  Resp: 20 (12/22/23 1118)  BP: (!) 95/51 (12/22/23 1118)  SpO2: 95 % (12/22/23 1118) Vital Signs (24h Range):  Temp:  [97.8 °F (36.6 °C)-98.9 °F (37.2 °C)] 98.1 °F (36.7 °C)  Pulse:  [] 103  Resp:  [16-22] 20  SpO2:  [93 %-98 %] 95 %  BP: ()/(51-78) 95/51     Weight: 119.7 kg (264 lb)  Body mass index is 38.99 kg/m².    Estimated Creatinine Clearance: 86.5 mL/min (based on SCr of 1 mg/dL).     Physical Exam  Vitals reviewed.   Constitutional:       General: He is not in acute distress.     Appearance: He is not ill-appearing.   HENT:      Head: Normocephalic and atraumatic.      Comments: NG tube in place  Pulmonary:      Effort: Pulmonary effort is normal. No respiratory distress.      Breath sounds: No stridor.   Abdominal:      General: There is no distension.      Palpations: Abdomen is soft.      Tenderness: There is no abdominal tenderness.   Genitourinary:     Comments: bryant  Musculoskeletal:      Right lower leg: No edema.      Left lower leg: No edema.      Comments: RUE> LUE edematous  Pain with movement and palpation of RUE   Skin:     General: Skin is warm and dry.      Comments: Midline incision clean, dry, intact.           Significant Labs:   Microbiology Results (last 7 days)       Procedure Component Value Units Date/Time    Culture, Anaerobe [[id number]] Collected: 12/15/23 1657    Order Status: Completed Specimen: Wound from Back Updated: 12/22/23 0739     Anaerobic Culture No anaerobes isolated    Narrative:      3.) 2,3 Disc space    Culture, Anaerobe [[id number]] Collected: 12/15/23 1603    Order Status: Completed Specimen: Wound from Back Updated: 12/22/23 0725     Anaerobic Culture No anaerobes  isolated    Narrative:      Paraspinal    Culture, Anaerobe [[id number]] Collected: 12/15/23 1655    Order Status: Completed Specimen: Wound from Back Updated: 12/22/23 0739     Anaerobic Culture No anaerobes isolated    Narrative:      2.) 2,3 Disc space    Culture, Anaerobic [[id number]] Collected: 12/13/23 1703    Order Status: Completed Specimen: Abscess from Buttocks, Left Updated: 12/21/23 0743     Anaerobic Culture No anaerobes isolated    Fungus culture [[id number]] Collected: 12/15/23 1657    Order Status: Completed Specimen: Wound from Back Updated: 12/20/23 1357     Fungus (Mycology) Culture Culture in progress    Narrative:      3.) 2,3 Disc space    Fungus culture [[id number]] Collected: 12/15/23 1603    Order Status: Completed Specimen: Wound from Back Updated: 12/20/23 1357     Fungus (Mycology) Culture Culture in progress    Narrative:      Paraspinal    Fungus culture [[id number]] Collected: 12/15/23 1655    Order Status: Completed Specimen: Wound from Back Updated: 12/20/23 1357     Fungus (Mycology) Culture Culture in progress    Narrative:      2.) 2,3 Disc space    Fungus culture [[id number]] Collected: 12/13/23 1703    Order Status: Completed Specimen: Abscess from Buttocks, Left Updated: 12/20/23 1312     Fungus (Mycology) Culture Culture in progress    AFB Culture & Smear [[id number]] Collected: 12/15/23 1655    Order Status: Completed Specimen: Wound from Back Updated: 12/18/23 1436     AFB Culture & Smear Culture in progress     AFB CULTURE STAIN No acid fast bacilli seen.    Narrative:      2.) 2,3 Disc space    AFB Culture & Smear [[id number]] Collected: 12/15/23 1657    Order Status: Completed Specimen: Wound from Back Updated: 12/18/23 1436     AFB Culture & Smear Culture in progress     AFB CULTURE STAIN No acid fast bacilli seen.    Narrative:      3.) 2,3 Disc space    AFB Culture & Smear [[id number]] Collected: 12/15/23 1603    Order Status: Completed Specimen: Wound from Back  Updated: 12/18/23 1436     AFB Culture & Smear Culture in progress     AFB CULTURE STAIN No acid fast bacilli seen.    Narrative:      Paraspinal    Aerobic culture [[id number]] Collected: 12/15/23 1655    Order Status: Completed Specimen: Wound from Back Updated: 12/18/23 0915     Aerobic Bacterial Culture No growth    Narrative:      2.) 2,3 Disc space    Aerobic culture [[id number]] Collected: 12/15/23 1603    Order Status: Completed Specimen: Wound from Back Updated: 12/18/23 0915     Aerobic Bacterial Culture No growth    Narrative:      Paraspinal    Aerobic culture [[id number]] Collected: 12/15/23 1657    Order Status: Completed Specimen: Wound from Back Updated: 12/18/23 0913     Aerobic Bacterial Culture No growth    Narrative:      3.) 2,3 Disc space    Blood culture [[id number]] Collected: 12/12/23 0033    Order Status: Completed Specimen: Blood Updated: 12/17/23 0612     Blood Culture, Routine No growth after 5 days.    Blood culture [[id number]] Collected: 12/12/23 0033    Order Status: Completed Specimen: Blood Updated: 12/17/23 0612     Blood Culture, Routine No growth after 5 days.    Aerobic culture [[id number]] Collected: 12/13/23 1703    Order Status: Completed Specimen: Abscess from Buttocks, Left Updated: 12/16/23 1109     Aerobic Bacterial Culture No growth    Blood culture x two cultures. Draw prior to antibiotics. [[id number]] Collected: 12/10/23 2007    Order Status: Completed Specimen: Blood from Peripheral, Antecubital, Left Updated: 12/15/23 2212     Blood Culture, Routine No growth after 5 days.    Narrative:      Aerobic and anaerobic    Blood culture x two cultures. Draw prior to antibiotics. [[id number]] Collected: 12/10/23 2007    Order Status: Completed Specimen: Blood from Peripheral, Hand, Left Updated: 12/15/23 2212     Blood Culture, Routine No growth after 5 days.    Narrative:      Aerobic and anaerobic    Gram stain [[id number]] Collected: 12/15/23 1657    Order Status:  Completed Specimen: Wound from Back Updated: 12/15/23 1900     Gram Stain Result No WBC's      No organisms seen    Narrative:      3.) 2,3 Disc space    Gram stain [[id number]] Collected: 12/15/23 1655    Order Status: Completed Specimen: Wound from Back Updated: 12/15/23 1857     Gram Stain Result No WBC's      No organisms seen    Narrative:      2.) 2,3 Disc space    Gram stain [[id number]] Collected: 12/15/23 1603    Order Status: Completed Specimen: Wound from Back Updated: 12/15/23 1855     Gram Stain Result No WBC's      No organisms seen    Narrative:      Paraspinal    Culture, Anaerobe [[id number]]     Order Status: No result Specimen: Bone from Back     Aerobic culture [[id number]]     Order Status: No result Specimen: Bone from Back     AFB Culture & Smear [[id number]]     Order Status: No result Specimen: Bone from Back     Gram stain [[id number]]     Order Status: No result Specimen: Bone from Back     Fungus culture [[id number]]     Order Status: No result Specimen: Bone from Back           Pathology Results  (Last 10 years)                 12/15/23 1735  Specimen to Pathology, Surgery Neurosurgery Final result    Narrative:  Pre-op Diagnosis: Discitis, unspecified spinal region   [M46.40]   Procedure(s):   **LOOP X**FUSION, SPINE, LUMBAR, TLIF, POSTERIOR APPROACH,   USING PEDICLE SCREW   Number of specimens: 2   Name of specimens: 1. Facet Joint- permanent   2. 2, 3 Disc Space-   permanent   Which provider would you like to cc?->NALINI BENTLEY   Release to patient->Immediate   Specimen total (fresh, frozen, permanent):->2       07/28/23 1133  Specimen to Pathology, Surgery Gastrointestinal tract Final result    Narrative:  Pre-op Diagnosis: Diarrhea, unspecified type [R19.7]   Abnormal finding on GI tract imaging [R93.3]   Procedure(s):   EGD (ESOPHAGOGASTRODUODENOSCOPY)   COLONOSCOPY   Jar #1: Transverse colon polyp x1   Which provider would you like to cc?->HELEN AWAD   Release to  patient->Immediate   Specimen total (fresh, frozen, permanent):->1               Significant Imaging: I have reviewed all pertinent imaging results/findings within the past 24 hours.

## 2023-12-22 NOTE — ASSESSMENT & PLAN NOTE
Acute bump noted  700s --> 300s in 24 hours  Stable bilirubin; no N/V  No obvious DILI per pharm review  Checking Ammonia, hep viral panel, INR, CPK, GGT, US abd, CT abd  IVFs

## 2023-12-22 NOTE — PLAN OF CARE
Problem: Infection  Goal: Absence of Infection Signs and Symptoms  Outcome: Ongoing, Progressing     Problem: Bariatric Environmental Safety  Goal: Safety Maintained with Care  Outcome: Ongoing, Progressing     Problem: Adjustment to Illness (Sepsis/Septic Shock)  Goal: Optimal Coping  Outcome: Ongoing, Progressing     Problem: Fluid and Electrolyte Imbalance (Acute Kidney Injury/Impairment)  Goal: Fluid and Electrolyte Balance  Outcome: Ongoing, Progressing     Problem: Oral Intake Inadequate (Acute Kidney Injury/Impairment)  Goal: Optimal Nutrition Intake  Outcome: Ongoing, Progressing

## 2023-12-22 NOTE — ASSESSMENT & PLAN NOTE
Septic encephalopathy  delirium  Pain  polypharmacy    Neurology consulted, low suspicion for seizures, possibly gout flare per their assessment but this seems unlikely given his presentation and MRI/CT findings  Placed on provigil due to somnolence

## 2023-12-22 NOTE — PROGRESS NOTES
Pharmacokinetic Assessment Follow Up: IV Vancomycin    Vancomycin serum concentration assessment(s):    -Vancomycin random level was drawn with AM labs and can be used to guide therapy.  -A level of 24.6 mcg/mL remains above goal 15-20 mcg/mL for OM.  -Scr 0.8 mg/dL > 1 mg/dL. CTM.  -Last vancomycin dose was 12/20  (after level resulted).     Vancomycin Regimen Plan:    -Anticipating slow clearance of vancomycin. Holding dosing today.   -Will sample a level 12/23 tomorrow to better assess.    Drug levels (last 3 results):  Recent Labs   Lab Result Units 12/20/23  1126 12/22/23  0433   Vancomycin, Random ug/mL  --  24.6   Vancomycin-Trough ug/mL 25.5*  --        Pharmacy will continue to follow and monitor vancomycin.    Please contact pharmacy at extension [phone] for questions regarding this assessment.    Thank you for the consult,   Tc Li       Patient brief summary:  Bj Pate Jr. is a 73 y.o. male initiated on antimicrobial therapy with IV Vancomycin for treatment of bone/joint infection    Drug Allergies:   Review of patient's allergies indicates:   Allergen Reactions    Tomato (solanum lycopersicum) Hives    Naproxen Hives    Shrimp Other (See Comments)       Actual Body Weight:   119.7 kg    Renal Function:   Estimated Creatinine Clearance: 86.5 mL/min (based on SCr of 1 mg/dL).,     Dialysis Method (if applicable):  N/A    CBC (last 72 hours):  Recent Labs   Lab Result Units 12/20/23  0019 12/21/23  0347 12/22/23  0433   WBC K/uL 21.56* 21.20* 29.53*   Hemoglobin g/dL 7.8* 7.2* 7.3*   Hematocrit % 24.3* 22.5* 22.2*   Platelets K/uL 418 432 469*   Gran % % 78.0* 95.0* 80.1*   Lymph % % 3.0* 1.0* 5.0*   Mono % % 8.0 3.0* 9.6   Eosinophil % % 1.0 1.0 0.4   Basophil % % 0.0 0.0 0.2   Differential Method  Manual Manual Automated       Metabolic Panel (last 72 hours):  Recent Labs   Lab Result Units 12/20/23  0019 12/21/23  0347 12/22/23  0433   Sodium mmol/L 140 142 141   Potassium mmol/L 3.9 3.8 3.8    Chloride mmol/L 106 105 102   CO2 mmol/L 26 30* 28   Glucose mg/dL 174* 139* 127*   BUN mg/dL 41* 50* 58*   Creatinine mg/dL 0.8 0.8 1.0   Albumin g/dL 1.5* 1.3* 1.4*   Total Bilirubin mg/dL 0.5 0.6 0.7   Alkaline Phosphatase U/L 125 270* 281*   AST U/L 73* 799* 354*   ALT U/L 48* 356* 296*   Magnesium mg/dL 1.9 1.8 1.8   Phosphorus mg/dL 1.7* 1.7* 2.1*       Vancomycin Administrations:  vancomycin given in the last 96 hours                     vancomycin 1.75 g in 5 % dextrose 500 mL IVPB (mg) 1,750 mg New Bag 12/20/23 1200     1,750 mg New Bag 12/19/23 1031     1,750 mg New Bag 12/18/23 1225                    Microbiologic Results:  Microbiology Results (last 7 days)       Procedure Component Value Units Date/Time    Culture, Anaerobic [[id number]] Collected: 12/13/23 1703    Order Status: Completed Specimen: Abscess from Buttocks, Left Updated: 12/21/23 0743     Anaerobic Culture No anaerobes isolated    Fungus culture [[id number]] Collected: 12/15/23 1657    Order Status: Completed Specimen: Wound from Back Updated: 12/20/23 1357     Fungus (Mycology) Culture Culture in progress    Narrative:      3.) 2,3 Disc space    Fungus culture [[id number]] Collected: 12/15/23 1603    Order Status: Completed Specimen: Wound from Back Updated: 12/20/23 1357     Fungus (Mycology) Culture Culture in progress    Narrative:      Paraspinal    Fungus culture [[id number]] Collected: 12/15/23 1655    Order Status: Completed Specimen: Wound from Back Updated: 12/20/23 1357     Fungus (Mycology) Culture Culture in progress    Narrative:      2.) 2,3 Disc space    Fungus culture [[id number]] Collected: 12/13/23 1703    Order Status: Completed Specimen: Abscess from Buttocks, Left Updated: 12/20/23 1312     Fungus (Mycology) Culture Culture in progress    Culture, Anaerobe [[id number]] Collected: 12/15/23 1657    Order Status: Completed Specimen: Wound from Back Updated: 12/19/23 0852     Anaerobic Culture Culture in progress     Narrative:      3.) 2,3 Disc space    Culture, Anaerobe [[id number]] Collected: 12/15/23 1655    Order Status: Completed Specimen: Wound from Back Updated: 12/19/23 0851     Anaerobic Culture Culture in progress    Narrative:      2.) 2,3 Disc space    Culture, Anaerobe [[id number]] Collected: 12/15/23 1603    Order Status: Completed Specimen: Wound from Back Updated: 12/19/23 0850     Anaerobic Culture Culture in progress    Narrative:      Paraspinal    AFB Culture & Smear [[id number]] Collected: 12/15/23 1655    Order Status: Completed Specimen: Wound from Back Updated: 12/18/23 1436     AFB Culture & Smear Culture in progress     AFB CULTURE STAIN No acid fast bacilli seen.    Narrative:      2.) 2,3 Disc space    AFB Culture & Smear [[id number]] Collected: 12/15/23 1657    Order Status: Completed Specimen: Wound from Back Updated: 12/18/23 1436     AFB Culture & Smear Culture in progress     AFB CULTURE STAIN No acid fast bacilli seen.    Narrative:      3.) 2,3 Disc space    AFB Culture & Smear [[id number]] Collected: 12/15/23 1603    Order Status: Completed Specimen: Wound from Back Updated: 12/18/23 1436     AFB Culture & Smear Culture in progress     AFB CULTURE STAIN No acid fast bacilli seen.    Narrative:      Paraspinal    Aerobic culture [[id number]] Collected: 12/15/23 1655    Order Status: Completed Specimen: Wound from Back Updated: 12/18/23 0915     Aerobic Bacterial Culture No growth    Narrative:      2.) 2,3 Disc space    Aerobic culture [[id number]] Collected: 12/15/23 1603    Order Status: Completed Specimen: Wound from Back Updated: 12/18/23 0915     Aerobic Bacterial Culture No growth    Narrative:      Paraspinal    Aerobic culture [[id number]] Collected: 12/15/23 1657    Order Status: Completed Specimen: Wound from Back Updated: 12/18/23 0913     Aerobic Bacterial Culture No growth    Narrative:      3.) 2,3 Disc space    Blood culture [[id number]] Collected: 12/12/23 0033    Order Status:  Completed Specimen: Blood Updated: 12/17/23 0612     Blood Culture, Routine No growth after 5 days.    Blood culture [[id number]] Collected: 12/12/23 0033    Order Status: Completed Specimen: Blood Updated: 12/17/23 0612     Blood Culture, Routine No growth after 5 days.    Aerobic culture [[id number]] Collected: 12/13/23 1703    Order Status: Completed Specimen: Abscess from Buttocks, Left Updated: 12/16/23 1109     Aerobic Bacterial Culture No growth    Blood culture x two cultures. Draw prior to antibiotics. [[id number]] Collected: 12/10/23 2007    Order Status: Completed Specimen: Blood from Peripheral, Antecubital, Left Updated: 12/15/23 2212     Blood Culture, Routine No growth after 5 days.    Narrative:      Aerobic and anaerobic    Blood culture x two cultures. Draw prior to antibiotics. [[id number]] Collected: 12/10/23 2007    Order Status: Completed Specimen: Blood from Peripheral, Hand, Left Updated: 12/15/23 2212     Blood Culture, Routine No growth after 5 days.    Narrative:      Aerobic and anaerobic    Gram stain [[id number]] Collected: 12/15/23 1657    Order Status: Completed Specimen: Wound from Back Updated: 12/15/23 1900     Gram Stain Result No WBC's      No organisms seen    Narrative:      3.) 2,3 Disc space    Gram stain [[id number]] Collected: 12/15/23 1655    Order Status: Completed Specimen: Wound from Back Updated: 12/15/23 1857     Gram Stain Result No WBC's      No organisms seen    Narrative:      2.) 2,3 Disc space    Gram stain [[id number]] Collected: 12/15/23 1603    Order Status: Completed Specimen: Wound from Back Updated: 12/15/23 1855     Gram Stain Result No WBC's      No organisms seen    Narrative:      Paraspinal    Culture, Anaerobe [[id number]]     Order Status: No result Specimen: Bone from Back     Aerobic culture [[id number]]     Order Status: No result Specimen: Bone from Back     AFB Culture & Smear [[id number]]     Order Status: No result Specimen: Bone from Back      Gram stain [[id number]]     Order Status: No result Specimen: Bone from Back     Fungus culture [[id number]]     Order Status: No result Specimen: Bone from Back

## 2023-12-22 NOTE — SUBJECTIVE & OBJECTIVE
Interval History: LFTs had bumped, dropping today from 700s to 300s. Pt denies CP; denies any abd pain. Dtr Stacy at the bedside; denies any prior history of liver disease.     Review of Systems  Objective:     Vital Signs (Most Recent):  Temp: 97.8 °F (36.6 °C) (12/22/23 0753)  Pulse: 76 (12/22/23 0935)  Resp: 18 (12/22/23 0935)  BP: (!) 93/55 (12/22/23 0753)  SpO2: (!) 94 % (12/22/23 0753) Vital Signs (24h Range):  Temp:  [97.8 °F (36.6 °C)-98.9 °F (37.2 °C)] 97.8 °F (36.6 °C)  Pulse:  [] 76  Resp:  [16-22] 18  SpO2:  [93 %-98 %] 94 %  BP: ()/(55-78) 93/55     Weight: 119.7 kg (264 lb)  Body mass index is 38.99 kg/m².    Intake/Output Summary (Last 24 hours) at 12/22/2023 1026  Last data filed at 12/22/2023 0920  Gross per 24 hour   Intake 880 ml   Output 500 ml   Net 380 ml         Physical Exam      Appears overall as same as yesterday; responding, partially confused  Clear lungs BL, unlabored breathing, on RA, no cyanosis  Hrt sounds RRR  AA, intermittently focusing attention on conversation  Abd soft, NT, ND; no jaundice  No LE edema  Mild unchanged BL UE edema  Does follow motor commands, minimally wiggles his toes upon commands; does have weak fist  2/5 BL

## 2023-12-23 PROBLEM — M79.642 PAIN OF LEFT HAND: Status: ACTIVE | Noted: 2023-12-23

## 2023-12-23 LAB
ALBUMIN SERPL BCP-MCNC: 1.2 G/DL (ref 3.5–5.2)
ALP SERPL-CCNC: 232 U/L (ref 55–135)
ALT SERPL W/O P-5'-P-CCNC: 220 U/L (ref 10–44)
ANION GAP SERPL CALC-SCNC: 12 MMOL/L (ref 8–16)
ANISOCYTOSIS BLD QL SMEAR: SLIGHT
AST SERPL-CCNC: 240 U/L (ref 10–40)
BASOPHILS # BLD AUTO: 0.04 K/UL (ref 0–0.2)
BASOPHILS NFR BLD: 0.2 % (ref 0–1.9)
BILIRUB SERPL-MCNC: 1.1 MG/DL (ref 0.1–1)
BUN SERPL-MCNC: 59 MG/DL (ref 8–23)
CALCIUM SERPL-MCNC: 9.2 MG/DL (ref 8.7–10.5)
CHLORIDE SERPL-SCNC: 105 MMOL/L (ref 95–110)
CO2 SERPL-SCNC: 27 MMOL/L (ref 23–29)
CREAT SERPL-MCNC: 1.3 MG/DL (ref 0.5–1.4)
DIFFERENTIAL METHOD BLD: ABNORMAL
DOHLE BOD BLD QL SMEAR: PRESENT
EOSINOPHIL # BLD AUTO: 0.1 K/UL (ref 0–0.5)
EOSINOPHIL NFR BLD: 0.5 % (ref 0–8)
ERYTHROCYTE [DISTWIDTH] IN BLOOD BY AUTOMATED COUNT: 15.6 % (ref 11.5–14.5)
EST. GFR  (NO RACE VARIABLE): 58.7 ML/MIN/1.73 M^2
GLUCOSE SERPL-MCNC: 64 MG/DL (ref 70–110)
HAPTOGLOB SERPL-MCNC: 244 MG/DL (ref 30–250)
HCT VFR BLD AUTO: 22.6 % (ref 40–54)
HGB BLD-MCNC: 7.1 G/DL (ref 14–18)
HYPOCHROMIA BLD QL SMEAR: ABNORMAL
IMM GRANULOCYTES # BLD AUTO: 0.83 K/UL (ref 0–0.04)
IMM GRANULOCYTES NFR BLD AUTO: 3.1 % (ref 0–0.5)
INR PPP: 1.1 (ref 0.8–1.2)
LDH SERPL L TO P-CCNC: 586 U/L (ref 110–260)
LYMPHOCYTES # BLD AUTO: 1.1 K/UL (ref 1–4.8)
LYMPHOCYTES NFR BLD: 4 % (ref 18–48)
MAGNESIUM SERPL-MCNC: 1.9 MG/DL (ref 1.6–2.6)
MCH RBC QN AUTO: 30 PG (ref 27–31)
MCHC RBC AUTO-ENTMCNC: 31.4 G/DL (ref 32–36)
MCV RBC AUTO: 95 FL (ref 82–98)
MONOCYTES # BLD AUTO: 2.3 K/UL (ref 0.3–1)
MONOCYTES NFR BLD: 8.7 % (ref 4–15)
NEUTROPHILS # BLD AUTO: 22.1 K/UL (ref 1.8–7.7)
NEUTROPHILS NFR BLD: 83.5 % (ref 38–73)
NRBC BLD-RTO: 0 /100 WBC
OVALOCYTES BLD QL SMEAR: ABNORMAL
PHOSPHATE SERPL-MCNC: 2.9 MG/DL (ref 2.7–4.5)
PLATELET # BLD AUTO: 471 K/UL (ref 150–450)
PLATELET BLD QL SMEAR: ABNORMAL
PMV BLD AUTO: 11.2 FL (ref 9.2–12.9)
POCT GLUCOSE: 110 MG/DL (ref 70–110)
POCT GLUCOSE: 138 MG/DL (ref 70–110)
POCT GLUCOSE: 96 MG/DL (ref 70–110)
POIKILOCYTOSIS BLD QL SMEAR: SLIGHT
POLYCHROMASIA BLD QL SMEAR: ABNORMAL
POTASSIUM SERPL-SCNC: 3.7 MMOL/L (ref 3.5–5.1)
PROT SERPL-MCNC: 6.3 G/DL (ref 6–8.4)
PROTHROMBIN TIME: 11.4 SEC (ref 9–12.5)
RBC # BLD AUTO: 2.37 M/UL (ref 4.6–6.2)
RETICS/RBC NFR AUTO: 2.2 % (ref 0.4–2)
SODIUM SERPL-SCNC: 144 MMOL/L (ref 136–145)
SPHEROCYTES BLD QL SMEAR: ABNORMAL
TARGETS BLD QL SMEAR: ABNORMAL
VANCOMYCIN SERPL-MCNC: 19.8 UG/ML
WBC # BLD AUTO: 26.44 K/UL (ref 3.9–12.7)
WBC TOXIC VACUOLES BLD QL SMEAR: PRESENT

## 2023-12-23 PROCEDURE — 94761 N-INVAS EAR/PLS OXIMETRY MLT: CPT

## 2023-12-23 PROCEDURE — 80053 COMPREHEN METABOLIC PANEL: CPT

## 2023-12-23 PROCEDURE — 85045 AUTOMATED RETICULOCYTE COUNT: CPT | Performed by: HOSPITALIST

## 2023-12-23 PROCEDURE — 94660 CPAP INITIATION&MGMT: CPT

## 2023-12-23 PROCEDURE — 25000003 PHARM REV CODE 250: Performed by: REGISTERED NURSE

## 2023-12-23 PROCEDURE — 99900035 HC TECH TIME PER 15 MIN (STAT)

## 2023-12-23 PROCEDURE — 85610 PROTHROMBIN TIME: CPT | Performed by: HOSPITALIST

## 2023-12-23 PROCEDURE — 63600175 PHARM REV CODE 636 W HCPCS: Performed by: HOSPITALIST

## 2023-12-23 PROCEDURE — 63600175 PHARM REV CODE 636 W HCPCS

## 2023-12-23 PROCEDURE — 25000003 PHARM REV CODE 250

## 2023-12-23 PROCEDURE — 25000242 PHARM REV CODE 250 ALT 637 W/ HCPCS: Performed by: PSYCHIATRY & NEUROLOGY

## 2023-12-23 PROCEDURE — 84100 ASSAY OF PHOSPHORUS: CPT

## 2023-12-23 PROCEDURE — 83615 LACTATE (LD) (LDH) ENZYME: CPT | Performed by: HOSPITALIST

## 2023-12-23 PROCEDURE — 80202 ASSAY OF VANCOMYCIN: CPT | Performed by: HOSPITALIST

## 2023-12-23 PROCEDURE — 94640 AIRWAY INHALATION TREATMENT: CPT

## 2023-12-23 PROCEDURE — 25000003 PHARM REV CODE 250: Performed by: HOSPITALIST

## 2023-12-23 PROCEDURE — 83010 ASSAY OF HAPTOGLOBIN QUANT: CPT | Performed by: HOSPITALIST

## 2023-12-23 PROCEDURE — A4216 STERILE WATER/SALINE, 10 ML: HCPCS | Performed by: PSYCHIATRY & NEUROLOGY

## 2023-12-23 PROCEDURE — 11000001 HC ACUTE MED/SURG PRIVATE ROOM

## 2023-12-23 PROCEDURE — 85025 COMPLETE CBC W/AUTO DIFF WBC: CPT

## 2023-12-23 PROCEDURE — 83735 ASSAY OF MAGNESIUM: CPT

## 2023-12-23 PROCEDURE — 25000003 PHARM REV CODE 250: Performed by: PSYCHIATRY & NEUROLOGY

## 2023-12-23 PROCEDURE — 27000221 HC OXYGEN, UP TO 24 HOURS

## 2023-12-23 RX ORDER — COLCHICINE 0.6 MG/1
0.6 TABLET, FILM COATED ORAL DAILY
Status: DISCONTINUED | OUTPATIENT
Start: 2023-12-23 | End: 2023-12-29 | Stop reason: HOSPADM

## 2023-12-23 RX ADMIN — MODAFINIL 200 MG: 100 TABLET ORAL at 06:12

## 2023-12-23 RX ADMIN — SODIUM CHLORIDE: 9 INJECTION, SOLUTION INTRAVENOUS at 03:12

## 2023-12-23 RX ADMIN — Medication 10 ML: at 06:12

## 2023-12-23 RX ADMIN — FLUTICASONE FUROATE AND VILANTEROL TRIFENATATE 1 PUFF: 200; 25 POWDER RESPIRATORY (INHALATION) at 07:12

## 2023-12-23 RX ADMIN — MEROPENEM 2 G: 1 INJECTION INTRAVENOUS at 01:12

## 2023-12-23 RX ADMIN — MEROPENEM 2 G: 1 INJECTION INTRAVENOUS at 09:12

## 2023-12-23 RX ADMIN — SODIUM CHLORIDE: 9 INJECTION, SOLUTION INTRAVENOUS at 04:12

## 2023-12-23 RX ADMIN — ENOXAPARIN SODIUM 30 MG: 30 INJECTION SUBCUTANEOUS at 04:12

## 2023-12-23 RX ADMIN — COLCHICINE 0.6 MG: 0.6 TABLET, FILM COATED ORAL at 01:12

## 2023-12-23 RX ADMIN — MEROPENEM 2 G: 1 INJECTION INTRAVENOUS at 05:12

## 2023-12-23 RX ADMIN — VANCOMYCIN HYDROCHLORIDE 750 MG: 750 INJECTION, POWDER, LYOPHILIZED, FOR SOLUTION INTRAVENOUS at 09:12

## 2023-12-23 RX ADMIN — Medication 10 ML: at 11:12

## 2023-12-23 RX ADMIN — VANCOMYCIN HYDROCHLORIDE 125 MG: KIT at 05:12

## 2023-12-23 RX ADMIN — PSYLLIUM HUSK 1 PACKET: 3.4 POWDER ORAL at 09:12

## 2023-12-23 RX ADMIN — Medication 10 ML: at 05:12

## 2023-12-23 RX ADMIN — Medication 10 ML: at 12:12

## 2023-12-23 NOTE — PLAN OF CARE
Problem: Adult Inpatient Plan of Care  Goal: Patient-Specific Goal (Individualized)  Description: Pt. Will maintain sbp below 160  Outcome: Ongoing, Progressing  Goal: Optimal Comfort and Wellbeing  Outcome: Ongoing, Progressing  Intervention: Monitor Pain and Promote Comfort  Flowsheets (Taken 12/23/2023 0510)  Pain Management Interventions: care clustered  Intervention: Provide Person-Centered Care  Flowsheets (Taken 12/23/2023 0510)  Trust Relationship/Rapport: care explained     Problem: Impaired Wound Healing  Goal: Optimal Wound Healing  Outcome: Ongoing, Progressing  Intervention: Promote Wound Healing  Flowsheets (Taken 12/23/2023 0510)  Sleep/Rest Enhancement: awakenings minimized  Activity Management: Rolling - L1  Pain Management Interventions: care clustered     Tube feedings discontinued.  Pureed diet. Vital signs stable. Bed in lowest position, side rails x 3, call light in reach.

## 2023-12-23 NOTE — ASSESSMENT & PLAN NOTE
Acute bump noted  700s --> 300s --> 200s  Stable bilirubin; no N/V  No obvious DILI per pharm review  unremarkable Ammonia, hep viral panel, INR, CPK, GGT, US abd, CT abd  IVFs  Serological workup in process per hepatology consult, including LDH, Retic count, haptoglobin,

## 2023-12-23 NOTE — NURSING
Nurses Note -- 4 Eyes      12/22/2023   10:50 PM      Skin assessed during: Daily Assessment      [] No Altered Skin Integrity Present    []Prevention Measures Documented      [x] Yes- Altered Skin Integrity Present or Discovered   [x] LDA Added if Not in Epic (Describe Wound)   [] New Altered Skin Integrity was Present on Admit and Documented in LDA   [] Wound Image Taken    Wound Care Consulted? Yes    Attending Nurse:  Marjorie Lam RN/Staff Member:  Deysi

## 2023-12-23 NOTE — PROGRESS NOTES
Connor Mina - Neurosurgery ([facility])  [facility] Medicine  Progress Note    Patient Name: Bj Pate Jr.  MRN: [id number]  Patient Class: IP- Inpatient   Admission Date: 12/10/2023  Length of Stay: 12 days  Attending Physician: Christ Rudd MD  Primary Care Provider: Jose Antonio Foster MD        Subjective:     Principal Problem:Acute osteomyelitis of lumbar spine        HPI:  This is a 73M with a h/o CVA  about 8 to 10 years ago with R sided deficits, DM, HTN who was BIB from home with family with 3 weeks of abdominal pain. Seen at multiple EDS, and generally unremarkable work up as per family. Within last few days started to have progressive worsening back pain. Became acute altered 2 days ago w/, LE weakness now requiring assistance to walk, as well as urinary incontinence. Patient is A/O 0x at admit. Accompanied by Daughter, most of history from her.     Admission to ED patient was febrile at 102 F, tachycardic 130, tachypneic at 23, hypertensive at 140/72 initially satting well on room air later requiring 2 L nasal cannula.  WBCs elevated 12.74 anemia 11.0 platelets normal, sed rate 120+, BUN 27 magnesium 1.5, albumin 2.4, .1, troponin 0.126 urine trace proteinuria trace ketones lactate initially elevated 2.6 later 1.3 CT head unremarkable for acute processes CT abdomen and pelvis and MRI lumbar spine shows left psoas abscess L2-L3 osteomyleitis, and diskitis.  Neurosurgery was consulted recommendations are pending.  Received Cefepime and Vanc    Overview/Hospital Course:  Mr. Pate was admitted for abdominal pain and worsening mental status, and was found to have a collection in his left psoas muscle, diskitis, and osteomyelitis in the L2-L3 region.  He was placed on vancomycin and meropenem for continuing decline in mental status.  Interventional Radiology took a core biopsy of infected disc, but were unable to aspirate anything from the left psoas collection. Due to his poor mental status, an VITOR  tube and tube feedings were ordered to assist with his nutrition status. On 12/15 he returned to his mental baseline and was alert and having conversations prior to an L1-L4 fusion and decompression with neurosurgery. Was admitted to [facility] for post operative monitoring following a L1-L4 segmental posterolateral fusion, L2-L4 laminectomy and L2-L3 TLIF for L2-L4 discitis/osteomyelitis on 12/15: Went for CTA because of lethargy since surgery. Did not tolerate procedure, returned to [facility]. Sedated and intubated for MRI pan spine, as patient had post surgical changes in exam. Continuing Vanc and Deo. MRI with stable findings except for typical post-operative findings. No further surgical plans per NSGY. Provigil given to improve wakefulness, will start daily. Extubated 11/17/2023. 12/19 PICC consulted placed for long-term IV antibiotic administration. Lasix 40mg IV given for BUE swelling. US extremities ordered to rule out DVT. Showed superficial thrombophlebitis of the right cephalic vein.  Had acute transaminitis noted on 12/21 and hepatocellular pattern.  CT abdomen and ultrasound Doppler negative for any inexplicable hepatobiliary pathology.  Spontaneous improvement.  Negative viral hepatitis panel    Interval History:  CT abdomen and ultrasound Doppler negative for any inexplicable hepatobiliary pathology.  Spontaneous improvement.  Negative viral hepatitis panel.  Patient denies any abdominal pain or chest pain.    Review of Systems  Objective:     Vital Signs (Most Recent):  Temp: 98.3 °F (36.8 °C) (12/23/23 1133)  Pulse: (!) 112 (12/23/23 1133)  Resp: (!) 22 (12/23/23 1133)  BP: 102/62 (12/23/23 1133)  SpO2: 100 % (12/23/23 1133) Vital Signs (24h Range):  Temp:  [97.4 °F (36.3 °C)-99.3 °F (37.4 °C)] 98.3 °F (36.8 °C)  Pulse:  [] 112  Resp:  [16-22] 22  SpO2:  [95 %-100 %] 100 %  BP: ()/(57-64) 102/62     Weight: 119.7 kg (264 lb)  Body mass index is 38.99 kg/m².    Intake/Output Summary (Last 24 hours)  at 12/23/2023 1147  Last data filed at 12/23/2023 0926  Gross per 24 hour   Intake 2187.78 ml   Output 2885 ml   Net -697.22 ml         Physical Exam      Clear lungs bilaterally, unlabored breathing, on room air, no cyanosis  NG tube in place   Heart sounds indicate a regular rate and rhythm   Abdomen is soft, nontender, nondistended   Following motor commands, pupils equal bilaterally  5/5 fist  bilaterally   No obvious lower extremity edema   Does wiggle the toes bilaterally upon command   Does have unchanged global edema in both hands but no babar erythema, left hand is diffusely tender to palpation, no tenderness noted in the right    Assessment/Plan:      * Acute osteomyelitis of lumbar spine  Psoas abscess  Was admitted to [facility] for post operative monitoring following a L1-L4 segmental posterolateral fusion, L2-L4 laminectomy and L2-L3 TLIF for L2-L4 discitis/osteomyelitis on 12/15.   S/p IR biopsy of psoas abscess and L2-L3 disc space.     -Continue  Meropenem and Vancomycin per ID recs    Discitis  See severe sepsis    Acute metabolic encephalopathy  Septic encephalopathy  delirium  Pain  polypharmacy    Neurology consulted, low suspicion for seizures, possibly gout flare per their assessment but this seems unlikely given his presentation and MRI/CT findings  Placed on provigil due to somnolence    Transaminitis  Acute bump noted  700s --> 300s --> 200s  Stable bilirubin; no N/V  No obvious DILI per pharm review  unremarkable Ammonia, hep viral panel, INR, CPK, GGT, US abd, CT abd  IVFs  Serological workup in process per hepatology consult, including LDH, Retic count, haptoglobin,       Acute on chronic systolic heart failure  Echo 12/10/2023    Interpretation Summary    Technically difficult portable study    Left Ventricle: The left ventricle is normal in size. There is concentric remodeling. Mild global hypokinesis present. There is mildly reduced systolic function with a visually estimated ejection  fraction of 45 - 50%.    Right Ventricle: Normal right ventricular cavity size. Wall thickness is normal. Right ventricle wall motion  is normal. Systolic function is normal.    Pulmonary Artery: The estimated pulmonary artery systolic pressure is at least 24 mmHg.    The IVC was not well visualized.    Lasix and losartan paused in light of acute transaminitis;     Leukocytosis  Could be related to transaminitis vs infx  F/u abd CT abd  Continue Invanz      Pain of left hand  Maybe gout?   Try colchicine today    Thrombophlebitis  RUE cephalic vein thrombus      Hemiparesis  History of R sided hemiparesis from prior CVA. Mostly resolved per family.  PT/OT     Stercoral colitis  CT AP w rectal colitis vs constipation    - brown bomb on 12/14 with large bowel movement  - Aggressive Bowel regimen after surgery    Back pain  Likely secondary to L2-L3 diskitis/ostemyelitis.    MRI spine completed  NSGY consulted. Surgery today for L1-L4 fusion and decompression  Pain meds PRN    Psoas muscle abscess  See severe sepsis    Malnutrition  Maintenance IVFs  NG tube placement ordered  RDN consulted, appreciate recs  Tube feeds held for surgery today    Urinary retention  Rivera placed by ED, PVR now that he has external condom catheter. Due to diskitis/osteomyelitis, concern for possible spinal cord compression.    Bladder scan as needed  Document PVRs    History of CVA (cerebrovascular accident)  -R thalamic, 2016  No acute processes on head CT or brain MRI      VTE Risk Mitigation (From admission, onward)           Ordered     enoxaparin injection 30 mg  Every 24 hours         12/22/23 0827     IP VTE HIGH RISK PATIENT  Once         12/11/23 0652     Place sequential compression device  Until discontinued         12/11/23 0652                    Discharge Planning   GLORIA: 12/26/2023     Code Status: Full Code   Is the patient medically ready for discharge?: No    Reason for patient still in hospital (select all that apply):  Patient trending condition, Laboratory test, Treatment, and Consult recommendations  Discharge Plan A: Long-term acute care facility (LTAC)   Discharge Delays: None known at this time              Christ Rudd MD  Department of Hospital Medicine   [facility] - Neurosurgery [facility])

## 2023-12-23 NOTE — PLAN OF CARE
Problem: Bariatric Environmental Safety  Goal: Safety Maintained with Care  Outcome: Ongoing, Progressing     Problem: Glycemic Control Impaired (Sepsis/Septic Shock)  Goal: Blood Glucose Level Within Desired Range  Outcome: Ongoing, Progressing     Problem: Infection Progression (Sepsis/Septic Shock)  Goal: Absence of Infection Signs and Symptoms  Outcome: Ongoing, Progressing     Problem: Nutrition Impaired (Sepsis/Septic Shock)  Goal: Optimal Nutrition Intake  Outcome: Ongoing, Progressing     Problem: Fluid and Electrolyte Imbalance (Acute Kidney Injury/Impairment)  Goal: Fluid and Electrolyte Balance  Outcome: Ongoing, Progressing     Problem: Oral Intake Inadequate (Acute Kidney Injury/Impairment)  Goal: Optimal Nutrition Intake  Outcome: Ongoing, Progressing     Problem: Renal Function Impairment (Acute Kidney Injury/Impairment)  Goal: Effective Renal Function  Outcome: Ongoing, Progressing     Problem: Impaired Wound Healing  Goal: Optimal Wound Healing  Outcome: Ongoing, Progressing     Problem: Skin Injury Risk Increased  Goal: Skin Health and Integrity  Outcome: Ongoing, Progressing

## 2023-12-23 NOTE — PROGRESS NOTES
[facility] - Neurosurgery [facility])  Infectious Disease  Progress Note    Patient Name: Bj Pate Jr.  MRN: [id number]  Admission Date: 12/10/2023  Length of Stay: 12 days  Attending Physician: Christ Rudd MD  Primary Care Provider: Jose Antonio Foster MD    Isolation Status: No active isolations  Assessment/Plan:      ID  * Acute osteomyelitis of lumbar spine  74 yo male with PMH of CVA, CKD3, HTN, DM2, COPD admitted with increasing lethargy s/p fall found to have L psoas abscess/lumbar OM s/p IR aspiration of L2/3 disc space (cx ngtd) and washout with lumbar fusion/TLIF/laminectomy with NSGY on 12/15.     post-op notable for lethargy with transfer to ICU requiring intubation and pressors. Op note with purulent material, cx neg and pathology negative. Post-op MRI revealed stable lumbar spondylodiscitis and L psoas abscess, possible cervical spondylodiscitis and bibasilar consolidations.     Afebrile. HDS. WBC slightly decreased 29 -> 26k today. With worsening anemia. Liver enzymes decreasing. CRP improved, but remains elevated 434 -> 252. Pt alert today, but remains confused to location, situation, time. Culture data remains negative. Remains on vanc, meropenem. Continues with diarrhea, though getting fiber and stool softeners. Remains with flexiseal.     US with right upper extremity superficial thrombosis.     Recommendations:  - continue empiric vancomycin. pharm to dose. Continue meropenem 2g IV 8hr. Anticipate 6-8 wks Iv abx.  - will trend white count. Consider MRI of cervical spine to follow up   - will start oral vanc to empirically treat for cdiff as pt with history and continues with loose stools.   - plan reviewed with ID staff. ID will follow.                 Thank you for your consult. I will follow-up with patient. Please contact us if you have any additional questions.    Christiano Ashley NP  Infectious Disease  [facility] - Neurosurgery [facility])    Subjective:     Principal Problem:Acute  osteomyelitis of lumbar spine    HPI: Mr. Pate is a 72 yo male with PMH of CVA, CKD3, HTN, DM2, COPD who presents with increasing lethargy and confusion.     Pt remains altered during assessment, history gathered per family at bedside and chart review. Per chart review, pt reported ~3 weeks of low back pain, as well as a fall leading up to presentation. Following the fall, pt reported lower extremity weakness and urinary retention. Daughter at bedside states that pt has not had any surgical procedures or injections recently, but was treated with pain management in 2019 and had lumbar and cervical injections. Pt is retired, but was a  previously. Family denied him having pets, denied adventurous hobbies, denied raw food intake. Daughter reported recent ER admission for abdominal pain. States pt required medication for a parasite that improved his symptoms. Upon chart review, appears pt presented in August with abdominal pain, CT AP was negative for acute findings. Pt was discharged and followed up with PCP shortly after and stated his symptoms had improved.     On admission, pt was febrile to 102, and tachycardic, with increasing leukocytosis, 13->16k, stable anemia, elevated sed/CRP: >120, 375, procal 1.37. blood cultures negative on admission. MRI lumbar spine noting discitis/osteomyelitis at L2-3 with left psoas abscess (2.2cm), as well as early osteo L3-4, noting difficult study and epidural abscess was difficult to exclude. CT head without evidence of superimposed acute intracranial process. MRI brain, cervical spine, and thoracic spine pending.     NSGY following and recommended IR consult for potential psoas abscess drainage and culture. Planning L2-pelvis decompression and fusion on 12/15/23.  IR planning CT guided aspiration/drainage of psoas abscess on either 12/12 or 12/13.     Pt is currently on vanc and cefepime. ID was consulted for antibiotic recs.       Interval History:   Afebrile. HDS.  WBC slightly decreased 29 -> 26k today. With worsening anemia. Liver enzymes decreasing. CRP improved, but remains elevated 434 -> 252.     Pt alert today, but remains confused to location, situation, time.     Culture data negative. Remains on vanc, meropenem.     Continues with diarrhea, though getting fiber and stool softeners. Remains with flexiseal.     Review of Systems   Unable to perform ROS: Mental status change   Skin:  Positive for wound.     Objective:     Vital Signs (Most Recent):  Temp: 98.3 °F (36.8 °C) (12/23/23 1133)  Pulse: (!) 112 (12/23/23 1133)  Resp: (!) 22 (12/23/23 1133)  BP: 102/62 (12/23/23 1133)  SpO2: 100 % (12/23/23 1133) Vital Signs (24h Range):  Temp:  [97.4 °F (36.3 °C)-99.3 °F (37.4 °C)] 98.3 °F (36.8 °C)  Pulse:  [] 112  Resp:  [16-22] 22  SpO2:  [95 %-100 %] 100 %  BP: ()/(57-64) 102/62     Weight: 119.7 kg (264 lb)  Body mass index is 38.99 kg/m².    Estimated Creatinine Clearance: 66.6 mL/min (based on SCr of 1.3 mg/dL).     Physical Exam  Vitals reviewed.   Constitutional:       General: He is not in acute distress.     Appearance: He is not ill-appearing.   HENT:      Head: Normocephalic and atraumatic.      Comments: NG tube in place  Pulmonary:      Effort: Pulmonary effort is normal. No respiratory distress.      Breath sounds: Normal breath sounds. No stridor.   Abdominal:      General: Abdomen is flat. There is no distension.      Palpations: Abdomen is soft.      Tenderness: There is no abdominal tenderness.      Comments: With flexiseal    Genitourinary:     Comments: bryant  Musculoskeletal:      Right lower leg: No edema.      Left lower leg: No edema.      Comments: RUE> LUE edematous  Pain with movement and palpation of RUE   Skin:     General: Skin is warm and dry.      Comments: Midline incision clean, dry, intact.    Neurological:      Mental Status: He is alert.          Significant Labs:   Microbiology Results (last 7 days)       Procedure Component  Value Units Date/Time    Culture, Anaerobe [[id number]] Collected: 12/15/23 1657    Order Status: Completed Specimen: Wound from Back Updated: 12/22/23 0739     Anaerobic Culture No anaerobes isolated    Narrative:      3.) 2,3 Disc space    Culture, Anaerobe [[id number]] Collected: 12/15/23 1603    Order Status: Completed Specimen: Wound from Back Updated: 12/22/23 0739     Anaerobic Culture No anaerobes isolated    Narrative:      Paraspinal    Culture, Anaerobe [[id number]] Collected: 12/15/23 1655    Order Status: Completed Specimen: Wound from Back Updated: 12/22/23 0739     Anaerobic Culture No anaerobes isolated    Narrative:      2.) 2,3 Disc space    Culture, Anaerobic [[id number]] Collected: 12/13/23 1703    Order Status: Completed Specimen: Abscess from Buttocks, Left Updated: 12/21/23 0743     Anaerobic Culture No anaerobes isolated    Fungus culture [[id number]] Collected: 12/15/23 1657    Order Status: Completed Specimen: Wound from Back Updated: 12/20/23 1357     Fungus (Mycology) Culture Culture in progress    Narrative:      3.) 2,3 Disc space    Fungus culture [[id number]] Collected: 12/15/23 1603    Order Status: Completed Specimen: Wound from Back Updated: 12/20/23 1357     Fungus (Mycology) Culture Culture in progress    Narrative:      Paraspinal    Fungus culture [[id number]] Collected: 12/15/23 1655    Order Status: Completed Specimen: Wound from Back Updated: 12/20/23 1357     Fungus (Mycology) Culture Culture in progress    Narrative:      2.) 2,3 Disc space    Fungus culture [[id number]] Collected: 12/13/23 1703    Order Status: Completed Specimen: Abscess from Buttocks, Left Updated: 12/20/23 1312     Fungus (Mycology) Culture Culture in progress    AFB Culture & Smear [[id number]] Collected: 12/15/23 1655    Order Status: Completed Specimen: Wound from Back Updated: 12/18/23 1436     AFB Culture & Smear Culture in progress     AFB CULTURE STAIN No acid fast bacilli seen.    Narrative:       2.) 2,3 Disc space    AFB Culture & Smear [[id number]] Collected: 12/15/23 1657    Order Status: Completed Specimen: Wound from Back Updated: 12/18/23 1436     AFB Culture & Smear Culture in progress     AFB CULTURE STAIN No acid fast bacilli seen.    Narrative:      3.) 2,3 Disc space    AFB Culture & Smear [[id number]] Collected: 12/15/23 1603    Order Status: Completed Specimen: Wound from Back Updated: 12/18/23 1436     AFB Culture & Smear Culture in progress     AFB CULTURE STAIN No acid fast bacilli seen.    Narrative:      Paraspinal    Aerobic culture [[id number]] Collected: 12/15/23 1655    Order Status: Completed Specimen: Wound from Back Updated: 12/18/23 0915     Aerobic Bacterial Culture No growth    Narrative:      2.) 2,3 Disc space    Aerobic culture [[id number]] Collected: 12/15/23 1603    Order Status: Completed Specimen: Wound from Back Updated: 12/18/23 0915     Aerobic Bacterial Culture No growth    Narrative:      Paraspinal    Aerobic culture [[id number]] Collected: 12/15/23 1657    Order Status: Completed Specimen: Wound from Back Updated: 12/18/23 0913     Aerobic Bacterial Culture No growth    Narrative:      3.) 2,3 Disc space    Blood culture [[id number]] Collected: 12/12/23 0033    Order Status: Completed Specimen: Blood Updated: 12/17/23 0612     Blood Culture, Routine No growth after 5 days.    Blood culture [[id number]] Collected: 12/12/23 0033    Order Status: Completed Specimen: Blood Updated: 12/17/23 0612     Blood Culture, Routine No growth after 5 days.          Pathology Results  (Last 10 years)                 12/15/23 1735  Specimen to Pathology, Surgery Neurosurgery Final result    Narrative:  Pre-op Diagnosis: Discitis, unspecified spinal region   [M46.40]   Procedure(s):   **LOOP X**FUSION, SPINE, LUMBAR, TLIF, POSTERIOR APPROACH,   USING PEDICLE SCREW   Number of specimens: 2   Name of specimens: 1. Facet Joint- permanent   2. 2, 3 Disc Space-   permanent   Which  provider would you like to cc?->NALINI BENTLEY   Release to patient->Immediate   Specimen total (fresh, frozen, permanent):->2       07/28/23 1133  Specimen to Pathology, Surgery Gastrointestinal tract Final result    Narrative:  Pre-op Diagnosis: Diarrhea, unspecified type [R19.7]   Abnormal finding on GI tract imaging [R93.3]   Procedure(s):   EGD (ESOPHAGOGASTRODUODENOSCOPY)   COLONOSCOPY   Jar #1: Transverse colon polyp x1   Which provider would you like to cc?->HELEN AWAD   Release to patient->Immediate   Specimen total (fresh, frozen, permanent):->1               Significant Imaging: I have reviewed all pertinent imaging results/findings within the past 24 hours.

## 2023-12-23 NOTE — PROGRESS NOTES
Pharmacokinetic Assessment Follow Up: IV Vancomycin    Vancomycin serum concentration assessment(s):    The random level was drawn correctly and can be used to guide therapy at this time. The measurement is within the desired definitive target range of 15 to 20 mcg/mL.    Vancomycin Regimen Plan:    Give vancomycin 750 mg IV x 1 dose today  Re-dose when the random level is less than 20 mcg/mL, next level to be drawn at 0900 on 12/24/23    Drug levels (last 3 results):  Recent Labs   Lab Result Units 12/20/23  1126 12/22/23  0433 12/23/23  0426   Vancomycin, Random ug/mL  --  24.6 19.8   Vancomycin-Trough ug/mL 25.5*  --   --        Pharmacy will continue to follow and monitor vancomycin.    Please contact pharmacy at extension [phone] for questions regarding this assessment.    Thank you for the consult,   Claribel Beard       Patient brief summary:  Bj Pate Jr. is a 73 y.o. male initiated on antimicrobial therapy with IV Vancomycin for treatment of bone/joint infection    The patient's current regimen is pulse dosing in setting of KISHA.    Drug Allergies:   Review of patient's allergies indicates:   Allergen Reactions    Tomato (solanum lycopersicum) Hives    Naproxen Hives    Shrimp Other (See Comments)       Actual Body Weight:   119.7 kg    Renal Function:   Estimated Creatinine Clearance: 66.6 mL/min (based on SCr of 1.3 mg/dL).,     Dialysis Method (if applicable):  N/A    CBC (last 72 hours):  Recent Labs   Lab Result Units 12/21/23  0347 12/22/23 0433 12/23/23  0426   WBC K/uL 21.20* 29.53* 26.44*   Hemoglobin g/dL 7.2* 7.3* 7.1*   Hematocrit % 22.5* 22.2* 22.6*   Platelets K/uL 432 469* 471*   Gran % % 95.0* 80.1* 83.5*   Lymph % % 1.0* 5.0* 4.0*   Mono % % 3.0* 9.6 8.7   Eosinophil % % 1.0 0.4 0.5   Basophil % % 0.0 0.2 0.2   Differential Method  Manual Automated Automated       Metabolic Panel (last 72 hours):  Recent Labs   Lab Result Units 12/21/23  0347 12/22/23  0433 12/23/23  0426   Sodium  mmol/L 142 141 144   Potassium mmol/L 3.8 3.8 3.7   Chloride mmol/L 105 102 105   CO2 mmol/L 30* 28 27   Glucose mg/dL 139* 127* 64*   BUN mg/dL 50* 58* 59*   Creatinine mg/dL 0.8 1.0 1.3   Albumin g/dL 1.3* 1.4* 1.2*   Total Bilirubin mg/dL 0.6 0.7 1.1*   Alkaline Phosphatase U/L 270* 281* 232*   AST U/L 799* 354* 240*   ALT U/L 356* 296* 220*   Magnesium mg/dL 1.8 1.8 1.9   Phosphorus mg/dL 1.7* 2.1* 2.9       Vancomycin Administrations:  vancomycin given in the last 96 hours                     vancomycin 750 mg in dextrose 5 % (D5W) 250 mL IVPB (Vial-Mate) (mg) 750 mg New Bag 12/23/23 0914    vancomycin 1.75 g in 5 % dextrose 500 mL IVPB (mg) 1,750 mg New Bag 12/20/23 1200     1,750 mg New Bag 12/19/23 1031                    Microbiologic Results:  Microbiology Results (last 7 days)       Procedure Component Value Units Date/Time    Culture, Anaerobe [[id number]] Collected: 12/15/23 1657    Order Status: Completed Specimen: Wound from Back Updated: 12/22/23 0739     Anaerobic Culture No anaerobes isolated    Narrative:      3.) 2,3 Disc space    Culture, Anaerobe [[id number]] Collected: 12/15/23 1603    Order Status: Completed Specimen: Wound from Back Updated: 12/22/23 0739     Anaerobic Culture No anaerobes isolated    Narrative:      Paraspinal    Culture, Anaerobe [[id number]] Collected: 12/15/23 1655    Order Status: Completed Specimen: Wound from Back Updated: 12/22/23 0739     Anaerobic Culture No anaerobes isolated    Narrative:      2.) 2,3 Disc space    Culture, Anaerobic [[id number]] Collected: 12/13/23 1703    Order Status: Completed Specimen: Abscess from Buttocks, Left Updated: 12/21/23 0743     Anaerobic Culture No anaerobes isolated    Fungus culture [[id number]] Collected: 12/15/23 1657    Order Status: Completed Specimen: Wound from Back Updated: 12/20/23 1357     Fungus (Mycology) Culture Culture in progress    Narrative:      3.) 2,3 Disc space    Fungus culture [[id number]] Collected:  12/15/23 1603    Order Status: Completed Specimen: Wound from Back Updated: 12/20/23 1357     Fungus (Mycology) Culture Culture in progress    Narrative:      Paraspinal    Fungus culture [[id number]] Collected: 12/15/23 1655    Order Status: Completed Specimen: Wound from Back Updated: 12/20/23 1357     Fungus (Mycology) Culture Culture in progress    Narrative:      2.) 2,3 Disc space    Fungus culture [[id number]] Collected: 12/13/23 1703    Order Status: Completed Specimen: Abscess from Buttocks, Left Updated: 12/20/23 1312     Fungus (Mycology) Culture Culture in progress    AFB Culture & Smear [[id number]] Collected: 12/15/23 1655    Order Status: Completed Specimen: Wound from Back Updated: 12/18/23 1436     AFB Culture & Smear Culture in progress     AFB CULTURE STAIN No acid fast bacilli seen.    Narrative:      2.) 2,3 Disc space    AFB Culture & Smear [[id number]] Collected: 12/15/23 1657    Order Status: Completed Specimen: Wound from Back Updated: 12/18/23 1436     AFB Culture & Smear Culture in progress     AFB CULTURE STAIN No acid fast bacilli seen.    Narrative:      3.) 2,3 Disc space    AFB Culture & Smear [[id number]] Collected: 12/15/23 1603    Order Status: Completed Specimen: Wound from Back Updated: 12/18/23 1436     AFB Culture & Smear Culture in progress     AFB CULTURE STAIN No acid fast bacilli seen.    Narrative:      Paraspinal    Aerobic culture [[id number]] Collected: 12/15/23 1655    Order Status: Completed Specimen: Wound from Back Updated: 12/18/23 0915     Aerobic Bacterial Culture No growth    Narrative:      2.) 2,3 Disc space    Aerobic culture [[id number]] Collected: 12/15/23 1603    Order Status: Completed Specimen: Wound from Back Updated: 12/18/23 0915     Aerobic Bacterial Culture No growth    Narrative:      Paraspinal    Aerobic culture [[id number]] Collected: 12/15/23 1657    Order Status: Completed Specimen: Wound from Back Updated: 12/18/23 0913     Aerobic Bacterial  Culture No growth    Narrative:      3.) 2,3 Disc space    Blood culture [[id number]] Collected: 12/12/23 0033    Order Status: Completed Specimen: Blood Updated: 12/17/23 0612     Blood Culture, Routine No growth after 5 days.    Blood culture [[id number]] Collected: 12/12/23 0033    Order Status: Completed Specimen: Blood Updated: 12/17/23 0612     Blood Culture, Routine No growth after 5 days.    Aerobic culture [[id number]] Collected: 12/13/23 1703    Order Status: Completed Specimen: Abscess from Buttocks, Left Updated: 12/16/23 1109     Aerobic Bacterial Culture No growth

## 2023-12-23 NOTE — CARE UPDATE
"RAPID RESPONSE NURSE CHART REVIEW        Chart Reviewed: 12/23/2023, 2:12 PM    MRN: [id number]  Bed: 945/945 A    Dx: Acute osteomyelitis of lumbar spine    Bj Pate Jr. has a past medical history of Acute on chronic systolic heart failure, Anemia, Anticoagulant long-term use, Basal ganglia hemorrhage, Benign hypertension with CKD (chronic kidney disease) stage III, Cataract, Chronic idiopathic gout of multiple sites, Chronic kidney disease, stage 3, COPD (chronic obstructive pulmonary disease), Erectile dysfunction, Gout, Hemorrhoids without complication, Hyperlipidemia, Morbid obesity, Obstructive sleep apnea on CPAP, Reactive airway disease without complication, Severe sepsis, Stroke, Thalamic infarct, acute (right), and 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    Last VS: /62 (BP Location: Right forearm, Patient Position: Lying)   Pulse (!) 112   Temp 98.3 °F (36.8 °C) (Oral)   Resp (!) 22   Ht 5' 9" (1.753 m)   Wt 119.7 kg (264 lb)   SpO2 100%   BMI 38.99 kg/m²     24H Vital Sign Range:  Temp:  [97.4 °F (36.3 °C)-99.3 °F (37.4 °C)]   Pulse:  []   Resp:  [16-22]   BP: ()/(57-64)   SpO2:  [95 %-100 %]     Level of Consciousness (AVPU): alert    Recent Labs     12/21/23  0347 12/22/23  0433 12/23/23  0426   WBC 21.20* 29.53* 26.44*   HGB 7.2* 7.3* 7.1*   HCT 22.5* 22.2* 22.6*    469* 471*       Recent Labs     12/21/23  0347 12/22/23  0433 12/23/23  0426    141 144   K 3.8 3.8 3.7    102 105   CO2 30* 28 27   BUN 50* 58* 59*   CREATININE 0.8 1.0 1.3   * 127* 64*   PHOS 1.7* 2.1* 2.9   MG 1.8 1.8 1.9        No results for input(s): "PH", "PCO2", "PO2", "HCO3", "POCSATURATED", "BE" in the last 72 hours.     OXYGEN:  Flow (L/min): 1  Oxygen Concentration (%): 21       MEWS score: 4    Rounding completed with charge BAILEE Oscar. Discussed tachycardia. No additional concerns verbalized at this time. Instructed to call [phone] for further concerns or " assistance.    Shira Miller RN

## 2023-12-23 NOTE — PROGRESS NOTES
[facility] - Neurosurgery ([facility])  Neurosurgery  Progress Note    Subjective:     History of Present Illness: Mr. Pate is a 73M w/ hx CVA, CKD3, HTN, DM2, COPD who presents with increasing lethargy and confusion. Per ED team, patient had 3 weeks of low back pain, with a fall yesterday leading to his presentation. He had subjective lower extremity weakness and urinary retention after the fall. Septic on presentation to ED. Too altered to meaningfully participate in spine motor exam at time of NSGY evaluation.     Post-Op Info:  Procedure(s) (LRB):  L-1 TO L-4 FUSION, SPINE, LUMBAR, TLIF, POSTERIOR APPROACH, USING PEDICLE SCREW (N/A)   8 Days Post-Op   Interval History: 12/23: NAEO. Intermittent tachycardia, vitals otherwise stable. Exam grossly stable. WBCs remain elevated to 26.44. On merepenem and vancomycin per primary.     Medications:  Continuous Infusions:   sodium chloride 0.9% 100 mL/hr at 12/23/23 0926     Scheduled Meds:   colchicine  0.6 mg Oral Daily    enoxparin  30 mg Subcutaneous Q24H (prophylaxis, 1700)    fluticasone furoate-vilanteroL  1 puff Inhalation Daily    meropenem (MERREM) IVPB  2 g Intravenous Q8H    modafiniL  200 mg Per NG tube Before breakfast    psyllium husk (aspartame)  1 packet Per NG tube BID    sodium chloride 0.9%  10 mL Intravenous Q6H     PRN Meds:dextrose 10%, dextrose 10%, glucagon (human recombinant), insulin aspart U-100, morphine, ondansetron, Flushing PICC/Midline Protocol **AND** sodium chloride 0.9% **AND** sodium chloride 0.9%, Pharmacy to dose Vancomycin consult **AND** vancomycin - pharmacy to dose     Review of Systems  Objective:     Weight: 119.7 kg (264 lb)  Body mass index is 38.99 kg/m².  Vital Signs (Most Recent):  Temp: 98.3 °F (36.8 °C) (12/23/23 1133)  Pulse: (!) 112 (12/23/23 1133)  Resp: (!) 22 (12/23/23 1133)  BP: 102/62 (12/23/23 1133)  SpO2: 100 % (12/23/23 1133) Vital Signs (24h Range):  Temp:  [97.4 °F (36.3 °C)-99.3 °F (37.4 °C)] 98.3 °F (36.8  °C)  Pulse:  [] 112  Resp:  [16-22] 22  SpO2:  [95 %-100 %] 100 %  BP: ()/(57-64) 102/62     Date 12/23/23 0700 - 12/24/23 0659   Shift 5662-0119 7827-3264 2092-5157 24 Hour Total   INTAKE   P.O. 222   222   I.V.(mL/kg) 935.3(7.8)   935.3(7.8)   IV Piggyback 121.9   121.9   Shift Total(mL/kg) 1279.1(10.7)   1279.1(10.7)   OUTPUT   Urine(mL/kg/hr) 360   360   Stool 100   100   Shift Total(mL/kg) 460(3.8)   460(3.8)   Weight (kg) 119.7 119.7 119.7 119.7              Oxygen Concentration (%):  [21] 21             NG/OG Tube 12/14/23 0800 nasogastric Left nostril (Active)   Placement Check placement verified by x-ray 12/23/23 0600   Tolerance no signs/symptoms of discomfort 12/23/23 0600   Securement secured to nostril center w/ adhesive device 12/22/23 0750   Clamp Status/Tolerance clamped 12/23/23 0600   Suction Setting/Drainage Method suction at the bedside 12/23/23 0600   Insertion Site Appearance no redness, warmth, tenderness, skin breakdown, drainage 12/23/23 0600   Drainage None 12/23/23 0600   Flush/Irrigation flushed w/;water 12/23/23 0600   Feeding Type continuous 12/22/23 0750   Feeding Action feeding held 12/22/23 0920   Current Rate (mL/hr) 50 mL/hr 12/22/23 0750   Goal Rate (mL/hr) 50 mL/hr 12/22/23 0750   Intake (mL) 200 mL 12/22/23 2100   Water Bolus (mL) 250 mL 12/22/23 0920   Tube Output(mL)(Include Discarded Residual) 0 mL 12/15/23 0006   Formula Name impact peptide 1.5 12/22/23 0750   Intake (mL) - Formula Tube Feeding 50 12/22/23 0500       Male External Urinary Catheter 12/21/23 1015 Small (Active)   Collection Container Standard drainage bag 12/22/23 0750   Skin no breakdown;no redness 12/23/23 0600   Tolerance no signs/symptoms of discomfort 12/23/23 0600   Output (mL) 360 mL 12/23/23 0926   Catheter Change Date 12/23/23 12/23/23 1100   Catheter Change Time 1100 12/23/23 1100            Fecal Incontinence  12/20/23 1332 (Active)   $ Fecal Management Supplies Fecal Management  System (Supply) 12/21/23 1525   Application fecal incontinence  changed 12/21/23 1525   Drainage Method attached to drainage bag 12/23/23 1100   Securement to gravity 12/20/23 2000   Skin breakdown;reddened;cleansed, skin barrier applied 12/23/23 1100   Tolerance no signs/symptoms of discomfort 12/23/23 1100   Stool (mL) 100 mL 12/23/23 0926     Physical Exam  General: well developed, well nourished, no distress.   Head: normocephalic, atraumatic  Mental Status: Awake, Alert, Oriented  Speech: Clear with content appropriate to conversation  Cranial nerves: CN II-XII grossly intact.   Sensory: intact to light touch throughout, diffusely tender in the lower extremities    Motor Strength:   BUE 2/5 throughout, edematous, painful to touch. BLE 2/5 proximally, 3/5 distally. No abnormal movements seen.     Clonus: absent       Significant Labs:  Recent Labs   Lab 12/22/23  0433 12/23/23  0426   * 64*    144   K 3.8 3.7    105   CO2 28 27   BUN 58* 59*   CREATININE 1.0 1.3   CALCIUM 9.6 9.2   MG 1.8 1.9     Recent Labs   Lab 12/22/23  0433 12/23/23  0426   WBC 29.53* 26.44*   HGB 7.3* 7.1*   HCT 22.2* 22.6*   * 471*     Recent Labs   Lab 12/22/23  1205 12/23/23  0426   INR 1.0 1.1     Microbiology Results (last 7 days)       Procedure Component Value Units Date/Time    Culture, Anaerobe [[id number]] Collected: 12/15/23 1657    Order Status: Completed Specimen: Wound from Back Updated: 12/22/23 0739     Anaerobic Culture No anaerobes isolated    Narrative:      3.) 2,3 Disc space    Culture, Anaerobe [[id number]] Collected: 12/15/23 1603    Order Status: Completed Specimen: Wound from Back Updated: 12/22/23 0739     Anaerobic Culture No anaerobes isolated    Narrative:      Paraspinal    Culture, Anaerobe [[id number]] Collected: 12/15/23 1655    Order Status: Completed Specimen: Wound from Back Updated: 12/22/23 0739     Anaerobic Culture No anaerobes isolated    Narrative:      2.) 2,3  Disc space    Culture, Anaerobic [[id number]] Collected: 12/13/23 1703    Order Status: Completed Specimen: Abscess from Buttocks, Left Updated: 12/21/23 0743     Anaerobic Culture No anaerobes isolated    Fungus culture [[id number]] Collected: 12/15/23 1657    Order Status: Completed Specimen: Wound from Back Updated: 12/20/23 1357     Fungus (Mycology) Culture Culture in progress    Narrative:      3.) 2,3 Disc space    Fungus culture [[id number]] Collected: 12/15/23 1603    Order Status: Completed Specimen: Wound from Back Updated: 12/20/23 1357     Fungus (Mycology) Culture Culture in progress    Narrative:      Paraspinal    Fungus culture [[id number]] Collected: 12/15/23 1655    Order Status: Completed Specimen: Wound from Back Updated: 12/20/23 1357     Fungus (Mycology) Culture Culture in progress    Narrative:      2.) 2,3 Disc space    Fungus culture [[id number]] Collected: 12/13/23 1703    Order Status: Completed Specimen: Abscess from Buttocks, Left Updated: 12/20/23 1312     Fungus (Mycology) Culture Culture in progress    AFB Culture & Smear [[id number]] Collected: 12/15/23 1655    Order Status: Completed Specimen: Wound from Back Updated: 12/18/23 1436     AFB Culture & Smear Culture in progress     AFB CULTURE STAIN No acid fast bacilli seen.    Narrative:      2.) 2,3 Disc space    AFB Culture & Smear [[id number]] Collected: 12/15/23 1657    Order Status: Completed Specimen: Wound from Back Updated: 12/18/23 1436     AFB Culture & Smear Culture in progress     AFB CULTURE STAIN No acid fast bacilli seen.    Narrative:      3.) 2,3 Disc space    AFB Culture & Smear [[id number]] Collected: 12/15/23 1603    Order Status: Completed Specimen: Wound from Back Updated: 12/18/23 1436     AFB Culture & Smear Culture in progress     AFB CULTURE STAIN No acid fast bacilli seen.    Narrative:      Paraspinal    Aerobic culture [[id number]] Collected: 12/15/23 1655    Order Status: Completed Specimen: Wound from  Back Updated: 12/18/23 0915     Aerobic Bacterial Culture No growth    Narrative:      2.) 2,3 Disc space    Aerobic culture [[id number]] Collected: 12/15/23 1603    Order Status: Completed Specimen: Wound from Back Updated: 12/18/23 0915     Aerobic Bacterial Culture No growth    Narrative:      Paraspinal    Aerobic culture [[id number]] Collected: 12/15/23 1657    Order Status: Completed Specimen: Wound from Back Updated: 12/18/23 0913     Aerobic Bacterial Culture No growth    Narrative:      3.) 2,3 Disc space    Blood culture [[id number]] Collected: 12/12/23 0033    Order Status: Completed Specimen: Blood Updated: 12/17/23 0612     Blood Culture, Routine No growth after 5 days.    Blood culture [[id number]] Collected: 12/12/23 0033    Order Status: Completed Specimen: Blood Updated: 12/17/23 0612     Blood Culture, Routine No growth after 5 days.          All pertinent labs from the last 24 hours have been reviewed.    Significant Diagnostics:  No new relevant imaging to discuss.   Assessment/Plan:     * Acute osteomyelitis of lumbar spine  A 73M w/ hx CVA, CKD3, HTN, DM2, and COPD who presents with AMS likely 2/2 underlying sepsis as well as progressive back pain and inability to ambulate for the past 2 weeks due to progressive L2-L3 osteodiscitis and presumed mechanical instability.     CTH 12/11: lacunar infarcts   MRI w/wo L sp 12/11: severe central stenosis L4-5, discitis osteomyelitis at L2-3, L psoas abscess up to 2 cm  MRI C/T w/wo: very poor with alot of motion artifact, but no OM else where   US BLE 12/12: negative  12/10: , CRP >120  BCx NGTD  MRI brain, Csp, CTH 12/16: nondiagnostic  MRI C/T/L w/wo 12/16 without contribution to picture of fluctuating sensorium and motor exam; severe spondylosis in Csp with some STIR and contrast enhancement prevertebral and possibly early osteodiscitis. MRI Tsp without concerning findings and MRI Lsp with expected postop changes.      Now s/p L1-L4  decompression, L2-L3 TLIF and L1-L4 PSF on 12/15    Subaxial cervical spondylosis which may require PCF in an outpatient setting  Encephalopathy is improving.   Continue multimodal pain medication   Turn every 2 hours to promote wound healing. Keep area clean and dry.   HV and WV removed 12/20   LSO brace when OOB  Intraoperative cultures NGTD, continue abx per ID  DVT ppx   PT/OT and OOB   Obtain post-op XR lumbar spine. Ok to obtain supine.   Rest of care per primary   Please notify neurosurgery on call for any acute neurologic change.            Yamilex Elizabeth PA-C  Neurosurgery  [facility] - Neurosurgery ([facility])

## 2023-12-23 NOTE — ASSESSMENT & PLAN NOTE
A 73M w/ hx CVA, CKD3, HTN, DM2, and COPD who presents with AMS likely 2/2 underlying sepsis as well as progressive back pain and inability to ambulate for the past 2 weeks due to progressive L2-L3 osteodiscitis and presumed mechanical instability.     CTH 12/11: lacunar infarcts   MRI w/wo L sp 12/11: severe central stenosis L4-5, discitis osteomyelitis at L2-3, L psoas abscess up to 2 cm  MRI C/T w/wo: very poor with alot of motion artifact, but no OM else where   US BLE 12/12: negative  12/10: , CRP >120  BCx NGTD  MRI brain, Csp, CTH 12/16: nondiagnostic  MRI C/T/L w/wo 12/16 without contribution to picture of fluctuating sensorium and motor exam; severe spondylosis in Csp with some STIR and contrast enhancement prevertebral and possibly early osteodiscitis. MRI Tsp without concerning findings and MRI Lsp with expected postop changes.      Now s/p L1-L4 decompression, L2-L3 TLIF and L1-L4 PSF on 12/15    Subaxial cervical spondylosis which may require PCF in an outpatient setting  Encephalopathy is improving.   Continue multimodal pain medication   Turn every 2 hours to promote wound healing. Keep area clean and dry.   HV and WV removed 12/20   LSO brace when OOB  Intraoperative cultures NGTD, continue abx per ID  DVT ppx   PT/OT and OOB   Obtain post-op XR lumbar spine. Ok to obtain supine.   Rest of care per primary   Please notify neurosurgery on call for any acute neurologic change.

## 2023-12-23 NOTE — SUBJECTIVE & OBJECTIVE
Interval History:  CT abdomen and ultrasound Doppler negative for any inexplicable hepatobiliary pathology.  Spontaneous improvement.  Negative viral hepatitis panel.  Patient denies any abdominal pain or chest pain.    Review of Systems  Objective:     Vital Signs (Most Recent):  Temp: 98.3 °F (36.8 °C) (12/23/23 1133)  Pulse: (!) 112 (12/23/23 1133)  Resp: (!) 22 (12/23/23 1133)  BP: 102/62 (12/23/23 1133)  SpO2: 100 % (12/23/23 1133) Vital Signs (24h Range):  Temp:  [97.4 °F (36.3 °C)-99.3 °F (37.4 °C)] 98.3 °F (36.8 °C)  Pulse:  [] 112  Resp:  [16-22] 22  SpO2:  [95 %-100 %] 100 %  BP: ()/(57-64) 102/62     Weight: 119.7 kg (264 lb)  Body mass index is 38.99 kg/m².    Intake/Output Summary (Last 24 hours) at 12/23/2023 1147  Last data filed at 12/23/2023 0926  Gross per 24 hour   Intake 2187.78 ml   Output 2885 ml   Net -697.22 ml         Physical Exam      Clear lungs bilaterally, unlabored breathing, on room air, no cyanosis  NG tube in place   Heart sounds indicate a regular rate and rhythm   Abdomen is soft, nontender, nondistended   Following motor commands, pupils equal bilaterally  5/5 fist  bilaterally   No obvious lower extremity edema   Does wiggle the toes bilaterally upon command   Does have unchanged global edema in both hands but no babar erythema, left hand is diffusely tender to palpation, no tenderness noted in the right

## 2023-12-23 NOTE — CONSULTS
[facility] [address]  Hepatology  Consult Note    Patient Name: Bj Pate Jr.  MRN: [id number]  Admission Date: 12/10/2023  Hospital Length of Stay: 12 days  Code Status: Full Code   Attending Provider:  Dr. Bryan  Consulting Provider: Jesús Lockett MD  Primary Care Physician: Jose Antonio Foster MD  Principal Problem:Acute osteomyelitis of lumbar spine    Inpatient consult to Hepatology  Consult performed by: Jesús Lockett MD  Consult ordered by: Christ Rudd MD        Subjective:     HPI: Bj Pate Jr. is a 73 y.o. male with history of CVA, CKD3, HTN, DM2, COPD admitted with increasing lethargy s/p fall found to have L psoas abscess/lumbar OM s/p IR aspiration of L2/3 disc space (cx ngtd) and washout with lumbar fusion/TLIF/laminectomy with NSGY on 12/15.  After surgery, he required transferred to the ICU for lethargy; was intubated and on pressors.  Patient now transferred back to the floor after being extubated 12/17/2023.    Hepatology is consulted for elevated transaminitis.  Transaminases had increased suddenly on 12/21 (to , ).  Bilirubin was normal and alk-phos was mildly elevated.  Ultrasound liver did not show any abnormalities; Doppler of liver unremarkable. TTE showed EF 45-50% with global hypokinesis. Peripheral smear significant for schistocytes.     On chart review, elevated LFTs in 2020 were 2/2 rhabdomyolysis.  Endorses moderate alcohol use (3-4 drinks with vodka over the weekends).      Review of Systems   Constitutional:  Positive for malaise/fatigue.   Gastrointestinal:  Negative for abdominal pain, blood in stool, nausea and vomiting.        Dysphagia   Musculoskeletal:         Difficulty ambulating        Objective:     Vitals:    12/23/23 0721   BP:    Pulse: 104   Resp: 16   Temp:          Constitutional:  Drowsy, ill appearing, but non-toxic, and well developed  HENT: Head: Normal, normocephalic, atraumatic.  NG tube in place.  Eyes: conjunctiva  clear and sclera nonicteric  GI: soft, non-tender, without masses or organomegaly, nondistended, normal bowel sounds, without guarding, and without rebound  Skin: normal color and no jaundice  Neurological:  Drowsy but can be awakened, oriented x3  speech normal in context and clarity  memory intact grossly  Psychiatric: oriented to time, place and person, mood and affect are within normal limits, pt is a good historian; no memory problems were noted      Significant Labs:  Recent Labs   Lab 12/20/23  0019 12/21/23  0347 12/22/23  0433   HGB 7.8* 7.2* 7.3*       Lab Results   Component Value Date    WBC 29.53 (H) 12/22/2023    HGB 7.3 (L) 12/22/2023    HCT 22.2 (L) 12/22/2023    MCV 91 12/22/2023     (H) 12/22/2023       Lab Results   Component Value Date     12/22/2023    K 3.8 12/22/2023     12/22/2023    CO2 28 12/22/2023    BUN 58 (H) 12/22/2023    CREATININE 1.0 12/22/2023    CALCIUM 9.6 12/22/2023    ANIONGAP 11 12/22/2023    ESTGFRAFRICA 46.2 (A) 03/11/2022    EGFRNONAA 40.0 (A) 03/11/2022       Lab Results   Component Value Date     (H) 12/22/2023     (H) 12/22/2023     (H) 12/22/2023    ALKPHOS 281 (H) 12/22/2023    BILITOT 0.7 12/22/2023       Lab Results   Component Value Date    INR 1.0 12/22/2023    INR 1.0 12/14/2023    INR 1.1 12/13/2023       Significant Imaging:  Reviewed pertinent radiology findings.       Assessment/Plan:     Bj Pate JrValentin is a 73 y.o. male with history of VA, CKD3, HTN, DM2, COPD admitted with increasing lethargy s/p fall found to have L psoas abscess/lumbar OM s/p IR aspiration of L2/3 disc space (cx ngtd) and washout with lumbar fusion/TLIF/laminectomy with NSGY on 12/15.  Required brief stay in MICU for hypotension.  On 12/21/23, had sudden spike in transaminases (peak , ), most consistent with hypotension causing shock liver.  LFTs are now downtrending.  Ultrasound liver with Doppler unremarkable.  Acute hepatitis  panel negative.  Review of LFTs show elevated AST prior to this episode as well - in the setting of peripheral smear with schistocytes, concerning for hemolysis.  Endorses moderate alcohol use.    Problem List:  Elevated LFTs (hepatocellular pattern), likely secondary to shock  Moderate alcohol use      Recommendations:  - Maintain adequate perfusion.   - to workup schistocytes, order hemolytic workup (haptoglobin, LDH, RC). If significant for hemolysis, consult hematology.   - Daily CMP. Obtain PEth.  - counseled patient to decrease alcohol use.    Thank you for involving us in the care of Bj Pate Jr.. Please call with any additional questions, concerns or changes in the patient's clinical status. We will continue to follow.     Jesús Lockett MD  Gastroenterology Fellow PGY V  [facility]

## 2023-12-23 NOTE — SUBJECTIVE & OBJECTIVE
Interval History: 12/23: NAEO. Intermittent tachycardia, vitals otherwise stable. Exam grossly stable. WBCs remain elevated to 26.44. On merepenem and vancomycin per primary.     Medications:  Continuous Infusions:   sodium chloride 0.9% 100 mL/hr at 12/23/23 0926     Scheduled Meds:   colchicine  0.6 mg Oral Daily    enoxparin  30 mg Subcutaneous Q24H (prophylaxis, 1700)    fluticasone furoate-vilanteroL  1 puff Inhalation Daily    meropenem (MERREM) IVPB  2 g Intravenous Q8H    modafiniL  200 mg Per NG tube Before breakfast    psyllium husk (aspartame)  1 packet Per NG tube BID    sodium chloride 0.9%  10 mL Intravenous Q6H     PRN Meds:dextrose 10%, dextrose 10%, glucagon (human recombinant), insulin aspart U-100, morphine, ondansetron, Flushing PICC/Midline Protocol **AND** sodium chloride 0.9% **AND** sodium chloride 0.9%, Pharmacy to dose Vancomycin consult **AND** vancomycin - pharmacy to dose     Review of Systems  Objective:     Weight: 119.7 kg (264 lb)  Body mass index is 38.99 kg/m².  Vital Signs (Most Recent):  Temp: 98.3 °F (36.8 °C) (12/23/23 1133)  Pulse: (!) 112 (12/23/23 1133)  Resp: (!) 22 (12/23/23 1133)  BP: 102/62 (12/23/23 1133)  SpO2: 100 % (12/23/23 1133) Vital Signs (24h Range):  Temp:  [97.4 °F (36.3 °C)-99.3 °F (37.4 °C)] 98.3 °F (36.8 °C)  Pulse:  [] 112  Resp:  [16-22] 22  SpO2:  [95 %-100 %] 100 %  BP: ()/(57-64) 102/62     Date 12/23/23 0700 - 12/24/23 0659   Shift 4825-2474 0407-5694 3945-0361 24 Hour Total   INTAKE   P.O. 222   222   I.V.(mL/kg) 935.3(7.8)   935.3(7.8)   IV Piggyback 121.9   121.9   Shift Total(mL/kg) 1279.1(10.7)   1279.1(10.7)   OUTPUT   Urine(mL/kg/hr) 360   360   Stool 100   100   Shift Total(mL/kg) 460(3.8)   460(3.8)   Weight (kg) 119.7 119.7 119.7 119.7              Oxygen Concentration (%):  [21] 21             NG/OG Tube 12/14/23 0800 nasogastric Left nostril (Active)   Placement Check placement verified by x-ray 12/23/23 0600   Tolerance no  signs/symptoms of discomfort 12/23/23 0600   Securement secured to nostril center w/ adhesive device 12/22/23 0750   Clamp Status/Tolerance clamped 12/23/23 0600   Suction Setting/Drainage Method suction at the bedside 12/23/23 0600   Insertion Site Appearance no redness, warmth, tenderness, skin breakdown, drainage 12/23/23 0600   Drainage None 12/23/23 0600   Flush/Irrigation flushed w/;water 12/23/23 0600   Feeding Type continuous 12/22/23 0750   Feeding Action feeding held 12/22/23 0920   Current Rate (mL/hr) 50 mL/hr 12/22/23 0750   Goal Rate (mL/hr) 50 mL/hr 12/22/23 0750   Intake (mL) 200 mL 12/22/23 2100   Water Bolus (mL) 250 mL 12/22/23 0920   Tube Output(mL)(Include Discarded Residual) 0 mL 12/15/23 0006   Formula Name impact peptide 1.5 12/22/23 0750   Intake (mL) - Formula Tube Feeding 50 12/22/23 0500       Male External Urinary Catheter 12/21/23 1015 Small (Active)   Collection Container Standard drainage bag 12/22/23 0750   Skin no breakdown;no redness 12/23/23 0600   Tolerance no signs/symptoms of discomfort 12/23/23 0600   Output (mL) 360 mL 12/23/23 0926   Catheter Change Date 12/23/23 12/23/23 1100   Catheter Change Time 1100 12/23/23 1100            Fecal Incontinence  12/20/23 1332 (Active)   $ Fecal Management Supplies Fecal Management System (Supply) 12/21/23 1525   Application fecal incontinence  changed 12/21/23 1525   Drainage Method attached to drainage bag 12/23/23 1100   Securement to gravity 12/20/23 2000   Skin breakdown;reddened;cleansed, skin barrier applied 12/23/23 1100   Tolerance no signs/symptoms of discomfort 12/23/23 1100   Stool (mL) 100 mL 12/23/23 0926     Physical Exam  General: well developed, well nourished, no distress.   Head: normocephalic, atraumatic  Mental Status: Awake, Alert, Oriented  Speech: Clear with content appropriate to conversation  Cranial nerves: CN II-XII grossly intact.   Sensory: intact to light touch throughout, diffusely tender  in the lower extremities    Motor Strength:   BUE 2/5 throughout, edematous, painful to touch. BLE 2/5 proximally, 3/5 distally. No abnormal movements seen.     Clonus: absent       Significant Labs:  Recent Labs   Lab 12/22/23  0433 12/23/23  0426   * 64*    144   K 3.8 3.7    105   CO2 28 27   BUN 58* 59*   CREATININE 1.0 1.3   CALCIUM 9.6 9.2   MG 1.8 1.9     Recent Labs   Lab 12/22/23  0433 12/23/23  0426   WBC 29.53* 26.44*   HGB 7.3* 7.1*   HCT 22.2* 22.6*   * 471*     Recent Labs   Lab 12/22/23  1205 12/23/23  0426   INR 1.0 1.1     Microbiology Results (last 7 days)       Procedure Component Value Units Date/Time    Culture, Anaerobe [[id number]] Collected: 12/15/23 1657    Order Status: Completed Specimen: Wound from Back Updated: 12/22/23 0739     Anaerobic Culture No anaerobes isolated    Narrative:      3.) 2,3 Disc space    Culture, Anaerobe [[id number]] Collected: 12/15/23 1603    Order Status: Completed Specimen: Wound from Back Updated: 12/22/23 0739     Anaerobic Culture No anaerobes isolated    Narrative:      Paraspinal    Culture, Anaerobe [[id number]] Collected: 12/15/23 1655    Order Status: Completed Specimen: Wound from Back Updated: 12/22/23 0739     Anaerobic Culture No anaerobes isolated    Narrative:      2.) 2,3 Disc space    Culture, Anaerobic [[id number]] Collected: 12/13/23 1703    Order Status: Completed Specimen: Abscess from Buttocks, Left Updated: 12/21/23 0743     Anaerobic Culture No anaerobes isolated    Fungus culture [[id number]] Collected: 12/15/23 1657    Order Status: Completed Specimen: Wound from Back Updated: 12/20/23 1357     Fungus (Mycology) Culture Culture in progress    Narrative:      3.) 2,3 Disc space    Fungus culture [[id number]] Collected: 12/15/23 1603    Order Status: Completed Specimen: Wound from Back Updated: 12/20/23 1357     Fungus (Mycology) Culture Culture in progress    Narrative:      Paraspinal    Fungus culture  [[id number]] Collected: 12/15/23 1655    Order Status: Completed Specimen: Wound from Back Updated: 12/20/23 1357     Fungus (Mycology) Culture Culture in progress    Narrative:      2.) 2,3 Disc space    Fungus culture [[id number]] Collected: 12/13/23 1703    Order Status: Completed Specimen: Abscess from Buttocks, Left Updated: 12/20/23 1312     Fungus (Mycology) Culture Culture in progress    AFB Culture & Smear [[id number]] Collected: 12/15/23 1655    Order Status: Completed Specimen: Wound from Back Updated: 12/18/23 1436     AFB Culture & Smear Culture in progress     AFB CULTURE STAIN No acid fast bacilli seen.    Narrative:      2.) 2,3 Disc space    AFB Culture & Smear [[id number]] Collected: 12/15/23 1657    Order Status: Completed Specimen: Wound from Back Updated: 12/18/23 1436     AFB Culture & Smear Culture in progress     AFB CULTURE STAIN No acid fast bacilli seen.    Narrative:      3.) 2,3 Disc space    AFB Culture & Smear [[id number]] Collected: 12/15/23 1603    Order Status: Completed Specimen: Wound from Back Updated: 12/18/23 1436     AFB Culture & Smear Culture in progress     AFB CULTURE STAIN No acid fast bacilli seen.    Narrative:      Paraspinal    Aerobic culture [[id number]] Collected: 12/15/23 1655    Order Status: Completed Specimen: Wound from Back Updated: 12/18/23 0915     Aerobic Bacterial Culture No growth    Narrative:      2.) 2,3 Disc space    Aerobic culture [[id number]] Collected: 12/15/23 1603    Order Status: Completed Specimen: Wound from Back Updated: 12/18/23 0915     Aerobic Bacterial Culture No growth    Narrative:      Paraspinal    Aerobic culture [[id number]] Collected: 12/15/23 1657    Order Status: Completed Specimen: Wound from Back Updated: 12/18/23 0913     Aerobic Bacterial Culture No growth    Narrative:      3.) 2,3 Disc space    Blood culture [[id number]] Collected: 12/12/23 0033    Order Status: Completed Specimen: Blood Updated: 12/17/23 0612     Blood  Culture, Routine No growth after 5 days.    Blood culture [[id number]] Collected: 12/12/23 0033    Order Status: Completed Specimen: Blood Updated: 12/17/23 0612     Blood Culture, Routine No growth after 5 days.          All pertinent labs from the last 24 hours have been reviewed.    Significant Diagnostics:  No new relevant imaging to discuss.

## 2023-12-23 NOTE — ASSESSMENT & PLAN NOTE
72 yo male with PMH of CVA, CKD3, HTN, DM2, COPD admitted with increasing lethargy s/p fall found to have L psoas abscess/lumbar OM s/p IR aspiration of L2/3 disc space (cx ngtd) and washout with lumbar fusion/TLIF/laminectomy with NSGY on 12/15.     post-op notable for lethargy with transfer to ICU requiring intubation and pressors. Op note with purulent material, cx neg and pathology negative. Post-op MRI revealed stable lumbar spondylodiscitis and L psoas abscess, possible cervical spondylodiscitis and bibasilar consolidations.     Afebrile. HDS. WBC slightly decreased 29 -> 26k today. With worsening anemia. Liver enzymes decreasing. CRP improved, but remains elevated 434 -> 252. Pt alert today, but remains confused to location, situation, time. Culture data remains negative. Remains on vanc, meropenem. Continues with diarrhea, though getting fiber and stool softeners. Remains with flexiseal.     US with right upper extremity superficial thrombosis.     Recommendations:  - continue empiric vancomycin. pharm to dose. Continue meropenem 2g IV 8hr. Anticipate 6-8 wks Iv abx.  - will trend white count. Consider MRI of cervical spine to follow up   - will start oral vanc to empirically treat for cdiff as pt with history and continues with loose stools.   - plan reviewed with ID staff. ID will follow.

## 2023-12-23 NOTE — SUBJECTIVE & OBJECTIVE
Interval History:   Afebrile. HDS. WBC slightly decreased 29 -> 26k today. With worsening anemia. Liver enzymes decreasing. CRP improved, but remains elevated 434 -> 252.     Pt alert today, but remains confused to location, situation, time.     Culture data negative. Remains on vanc, meropenem.     Continues with diarrhea, though getting fiber and stool softeners. Remains with flexiseal.     Review of Systems   Unable to perform ROS: Mental status change   Skin:  Positive for wound.     Objective:     Vital Signs (Most Recent):  Temp: 98.3 °F (36.8 °C) (12/23/23 1133)  Pulse: (!) 112 (12/23/23 1133)  Resp: (!) 22 (12/23/23 1133)  BP: 102/62 (12/23/23 1133)  SpO2: 100 % (12/23/23 1133) Vital Signs (24h Range):  Temp:  [97.4 °F (36.3 °C)-99.3 °F (37.4 °C)] 98.3 °F (36.8 °C)  Pulse:  [] 112  Resp:  [16-22] 22  SpO2:  [95 %-100 %] 100 %  BP: ()/(57-64) 102/62     Weight: 119.7 kg (264 lb)  Body mass index is 38.99 kg/m².    Estimated Creatinine Clearance: 66.6 mL/min (based on SCr of 1.3 mg/dL).     Physical Exam  Vitals reviewed.   Constitutional:       General: He is not in acute distress.     Appearance: He is not ill-appearing.   HENT:      Head: Normocephalic and atraumatic.      Comments: NG tube in place  Pulmonary:      Effort: Pulmonary effort is normal. No respiratory distress.      Breath sounds: Normal breath sounds. No stridor.   Abdominal:      General: Abdomen is flat. There is no distension.      Palpations: Abdomen is soft.      Tenderness: There is no abdominal tenderness.      Comments: With flexiseal    Genitourinary:     Comments: bryant  Musculoskeletal:      Right lower leg: No edema.      Left lower leg: No edema.      Comments: RUE> LUE edematous  Pain with movement and palpation of RUE   Skin:     General: Skin is warm and dry.      Comments: Midline incision clean, dry, intact.    Neurological:      Mental Status: He is alert.          Significant Labs:   Microbiology Results (last  7 days)       Procedure Component Value Units Date/Time    Culture, Anaerobe [[id number]] Collected: 12/15/23 1657    Order Status: Completed Specimen: Wound from Back Updated: 12/22/23 0739     Anaerobic Culture No anaerobes isolated    Narrative:      3.) 2,3 Disc space    Culture, Anaerobe [[id number]] Collected: 12/15/23 1603    Order Status: Completed Specimen: Wound from Back Updated: 12/22/23 0739     Anaerobic Culture No anaerobes isolated    Narrative:      Paraspinal    Culture, Anaerobe [[id number]] Collected: 12/15/23 1655    Order Status: Completed Specimen: Wound from Back Updated: 12/22/23 0739     Anaerobic Culture No anaerobes isolated    Narrative:      2.) 2,3 Disc space    Culture, Anaerobic [[id number]] Collected: 12/13/23 1703    Order Status: Completed Specimen: Abscess from Buttocks, Left Updated: 12/21/23 0743     Anaerobic Culture No anaerobes isolated    Fungus culture [[id number]] Collected: 12/15/23 1657    Order Status: Completed Specimen: Wound from Back Updated: 12/20/23 1357     Fungus (Mycology) Culture Culture in progress    Narrative:      3.) 2,3 Disc space    Fungus culture [[id number]] Collected: 12/15/23 1603    Order Status: Completed Specimen: Wound from Back Updated: 12/20/23 1357     Fungus (Mycology) Culture Culture in progress    Narrative:      Paraspinal    Fungus culture [[id number]] Collected: 12/15/23 1655    Order Status: Completed Specimen: Wound from Back Updated: 12/20/23 1357     Fungus (Mycology) Culture Culture in progress    Narrative:      2.) 2,3 Disc space    Fungus culture [[id number]] Collected: 12/13/23 1703    Order Status: Completed Specimen: Abscess from Buttocks, Left Updated: 12/20/23 1312     Fungus (Mycology) Culture Culture in progress    AFB Culture & Smear [[id number]] Collected: 12/15/23 1655    Order Status: Completed Specimen: Wound from Back Updated: 12/18/23 1436     AFB Culture & Smear Culture in progress     AFB CULTURE STAIN No acid  fast bacilli seen.    Narrative:      2.) 2,3 Disc space    AFB Culture & Smear [[id number]] Collected: 12/15/23 1657    Order Status: Completed Specimen: Wound from Back Updated: 12/18/23 1436     AFB Culture & Smear Culture in progress     AFB CULTURE STAIN No acid fast bacilli seen.    Narrative:      3.) 2,3 Disc space    AFB Culture & Smear [[id number]] Collected: 12/15/23 1603    Order Status: Completed Specimen: Wound from Back Updated: 12/18/23 1436     AFB Culture & Smear Culture in progress     AFB CULTURE STAIN No acid fast bacilli seen.    Narrative:      Paraspinal    Aerobic culture [[id number]] Collected: 12/15/23 1655    Order Status: Completed Specimen: Wound from Back Updated: 12/18/23 0915     Aerobic Bacterial Culture No growth    Narrative:      2.) 2,3 Disc space    Aerobic culture [[id number]] Collected: 12/15/23 1603    Order Status: Completed Specimen: Wound from Back Updated: 12/18/23 0915     Aerobic Bacterial Culture No growth    Narrative:      Paraspinal    Aerobic culture [[id number]] Collected: 12/15/23 1657    Order Status: Completed Specimen: Wound from Back Updated: 12/18/23 0913     Aerobic Bacterial Culture No growth    Narrative:      3.) 2,3 Disc space    Blood culture [[id number]] Collected: 12/12/23 0033    Order Status: Completed Specimen: Blood Updated: 12/17/23 0612     Blood Culture, Routine No growth after 5 days.    Blood culture [[id number]] Collected: 12/12/23 0033    Order Status: Completed Specimen: Blood Updated: 12/17/23 0612     Blood Culture, Routine No growth after 5 days.          Pathology Results  (Last 10 years)                 12/15/23 1735  Specimen to Pathology, Surgery Neurosurgery Final result    Narrative:  Pre-op Diagnosis: Discitis, unspecified spinal region   [M46.40]   Procedure(s):   **LOOP X**FUSION, SPINE, LUMBAR, TLIF, POSTERIOR APPROACH,   USING PEDICLE SCREW   Number of specimens: 2   Name of specimens: 1. Facet Joint- permanent   2. 2, 3  Disc Space-   permanent   Which provider would you like to cc?->NALINI BENTLEY   Release to patient->Immediate   Specimen total (fresh, frozen, permanent):->2       07/28/23 1133  Specimen to Pathology, Surgery Gastrointestinal tract Final result    Narrative:  Pre-op Diagnosis: Diarrhea, unspecified type [R19.7]   Abnormal finding on GI tract imaging [R93.3]   Procedure(s):   EGD (ESOPHAGOGASTRODUODENOSCOPY)   COLONOSCOPY   Jar #1: Transverse colon polyp x1   Which provider would you like to cc?->HELEN AWAD   Release to patient->Immediate   Specimen total (fresh, frozen, permanent):->1               Significant Imaging: I have reviewed all pertinent imaging results/findings within the past 24 hours.

## 2023-12-24 LAB
ALBUMIN SERPL BCP-MCNC: 1 G/DL (ref 3.5–5.2)
ALP SERPL-CCNC: 241 U/L (ref 55–135)
ALT SERPL W/O P-5'-P-CCNC: 308 U/L (ref 10–44)
ANION GAP SERPL CALC-SCNC: 9 MMOL/L (ref 8–16)
AST SERPL-CCNC: 587 U/L (ref 10–40)
BASOPHILS # BLD AUTO: 0.02 K/UL (ref 0–0.2)
BASOPHILS NFR BLD: 0.1 % (ref 0–1.9)
BILIRUB SERPL-MCNC: 1.2 MG/DL (ref 0.1–1)
BUN SERPL-MCNC: 47 MG/DL (ref 8–23)
CALCIUM SERPL-MCNC: 8.3 MG/DL (ref 8.7–10.5)
CHLORIDE SERPL-SCNC: 110 MMOL/L (ref 95–110)
CO2 SERPL-SCNC: 26 MMOL/L (ref 23–29)
CREAT SERPL-MCNC: 0.9 MG/DL (ref 0.5–1.4)
DIFFERENTIAL METHOD BLD: ABNORMAL
EOSINOPHIL # BLD AUTO: 0.1 K/UL (ref 0–0.5)
EOSINOPHIL NFR BLD: 0.4 % (ref 0–8)
ERYTHROCYTE [DISTWIDTH] IN BLOOD BY AUTOMATED COUNT: 15.6 % (ref 11.5–14.5)
EST. GFR  (NO RACE VARIABLE): >60 ML/MIN/1.73 M^2
GLUCOSE SERPL-MCNC: 95 MG/DL (ref 70–110)
HCT VFR BLD AUTO: 25.9 % (ref 40–54)
HGB BLD-MCNC: 8.4 G/DL (ref 14–18)
IMM GRANULOCYTES # BLD AUTO: 0.35 K/UL (ref 0–0.04)
IMM GRANULOCYTES NFR BLD AUTO: 2.1 % (ref 0–0.5)
INR PPP: 1.1 (ref 0.8–1.2)
LYMPHOCYTES # BLD AUTO: 0.7 K/UL (ref 1–4.8)
LYMPHOCYTES NFR BLD: 4 % (ref 18–48)
MAGNESIUM SERPL-MCNC: 1.7 MG/DL (ref 1.6–2.6)
MCH RBC QN AUTO: 29.2 PG (ref 27–31)
MCHC RBC AUTO-ENTMCNC: 32.4 G/DL (ref 32–36)
MCV RBC AUTO: 90 FL (ref 82–98)
MONOCYTES # BLD AUTO: 1.6 K/UL (ref 0.3–1)
MONOCYTES NFR BLD: 9.6 % (ref 4–15)
NEUTROPHILS # BLD AUTO: 13.7 K/UL (ref 1.8–7.7)
NEUTROPHILS NFR BLD: 83.8 % (ref 38–73)
NRBC BLD-RTO: 0 /100 WBC
PHOSPHATE SERPL-MCNC: 2.3 MG/DL (ref 2.7–4.5)
PLATELET # BLD AUTO: 371 K/UL (ref 150–450)
PMV BLD AUTO: 10.4 FL (ref 9.2–12.9)
POCT GLUCOSE: 110 MG/DL (ref 70–110)
POCT GLUCOSE: 123 MG/DL (ref 70–110)
POCT GLUCOSE: 136 MG/DL (ref 70–110)
POTASSIUM SERPL-SCNC: 2.9 MMOL/L (ref 3.5–5.1)
PROT SERPL-MCNC: 5.5 G/DL (ref 6–8.4)
PROTHROMBIN TIME: 11.8 SEC (ref 9–12.5)
RBC # BLD AUTO: 2.88 M/UL (ref 4.6–6.2)
SODIUM SERPL-SCNC: 145 MMOL/L (ref 136–145)
VANCOMYCIN SERPL-MCNC: 17.8 UG/ML
WBC # BLD AUTO: 16.34 K/UL (ref 3.9–12.7)

## 2023-12-24 PROCEDURE — 85610 PROTHROMBIN TIME: CPT | Performed by: HOSPITALIST

## 2023-12-24 PROCEDURE — 84100 ASSAY OF PHOSPHORUS: CPT

## 2023-12-24 PROCEDURE — A4216 STERILE WATER/SALINE, 10 ML: HCPCS | Performed by: PSYCHIATRY & NEUROLOGY

## 2023-12-24 PROCEDURE — 99900035 HC TECH TIME PER 15 MIN (STAT)

## 2023-12-24 PROCEDURE — 25000242 PHARM REV CODE 250 ALT 637 W/ HCPCS: Performed by: PSYCHIATRY & NEUROLOGY

## 2023-12-24 PROCEDURE — 27201109 HC SYSTEM FECAL MANAGEMENT

## 2023-12-24 PROCEDURE — 25000003 PHARM REV CODE 250: Performed by: PSYCHIATRY & NEUROLOGY

## 2023-12-24 PROCEDURE — 80053 COMPREHEN METABOLIC PANEL: CPT

## 2023-12-24 PROCEDURE — 63600175 PHARM REV CODE 636 W HCPCS

## 2023-12-24 PROCEDURE — 83735 ASSAY OF MAGNESIUM: CPT

## 2023-12-24 PROCEDURE — 63600175 PHARM REV CODE 636 W HCPCS: Performed by: HOSPITALIST

## 2023-12-24 PROCEDURE — 11000001 HC ACUTE MED/SURG PRIVATE ROOM

## 2023-12-24 PROCEDURE — 94660 CPAP INITIATION&MGMT: CPT

## 2023-12-24 PROCEDURE — 85025 COMPLETE CBC W/AUTO DIFF WBC: CPT

## 2023-12-24 PROCEDURE — 80202 ASSAY OF VANCOMYCIN: CPT | Performed by: HOSPITALIST

## 2023-12-24 PROCEDURE — 25000003 PHARM REV CODE 250: Performed by: HOSPITALIST

## 2023-12-24 PROCEDURE — 93005 ELECTROCARDIOGRAM TRACING: CPT

## 2023-12-24 PROCEDURE — 25000003 PHARM REV CODE 250: Performed by: REGISTERED NURSE

## 2023-12-24 PROCEDURE — 25000003 PHARM REV CODE 250

## 2023-12-24 RX ORDER — POTASSIUM CHLORIDE 7.45 MG/ML
10 INJECTION INTRAVENOUS ONCE
Status: COMPLETED | OUTPATIENT
Start: 2023-12-24 | End: 2023-12-24

## 2023-12-24 RX ORDER — MAGNESIUM SULFATE HEPTAHYDRATE 40 MG/ML
2 INJECTION, SOLUTION INTRAVENOUS ONCE
Status: COMPLETED | OUTPATIENT
Start: 2023-12-24 | End: 2023-12-24

## 2023-12-24 RX ORDER — FUROSEMIDE 10 MG/ML
20 INJECTION INTRAMUSCULAR; INTRAVENOUS ONCE
Status: COMPLETED | OUTPATIENT
Start: 2023-12-24 | End: 2023-12-24

## 2023-12-24 RX ORDER — SODIUM CHLORIDE 9 MG/ML
INJECTION, SOLUTION INTRAVENOUS CONTINUOUS
Status: DISCONTINUED | OUTPATIENT
Start: 2023-12-24 | End: 2023-12-28

## 2023-12-24 RX ADMIN — Medication 10 ML: at 12:12

## 2023-12-24 RX ADMIN — COLCHICINE 0.6 MG: 0.6 TABLET, FILM COATED ORAL at 09:12

## 2023-12-24 RX ADMIN — Medication 10 ML: at 01:12

## 2023-12-24 RX ADMIN — VANCOMYCIN HYDROCHLORIDE 125 MG: KIT at 06:12

## 2023-12-24 RX ADMIN — VANCOMYCIN HYDROCHLORIDE 125 MG: KIT at 12:12

## 2023-12-24 RX ADMIN — POTASSIUM CHLORIDE 10 MEQ: 7.46 INJECTION, SOLUTION INTRAVENOUS at 11:12

## 2023-12-24 RX ADMIN — MEROPENEM 2 G: 1 INJECTION INTRAVENOUS at 09:12

## 2023-12-24 RX ADMIN — VANCOMYCIN HYDROCHLORIDE 125 MG: KIT at 01:12

## 2023-12-24 RX ADMIN — MEROPENEM 2 G: 1 INJECTION INTRAVENOUS at 05:12

## 2023-12-24 RX ADMIN — FLUTICASONE FUROATE AND VILANTEROL TRIFENATATE 1 PUFF: 200; 25 POWDER RESPIRATORY (INHALATION) at 09:12

## 2023-12-24 RX ADMIN — FUROSEMIDE 20 MG: 10 INJECTION, SOLUTION INTRAMUSCULAR; INTRAVENOUS at 09:12

## 2023-12-24 RX ADMIN — VANCOMYCIN HYDROCHLORIDE 1000 MG: 1 INJECTION, POWDER, LYOPHILIZED, FOR SOLUTION INTRAVENOUS at 02:12

## 2023-12-24 RX ADMIN — MEROPENEM 2 G: 1 INJECTION INTRAVENOUS at 01:12

## 2023-12-24 RX ADMIN — VANCOMYCIN HYDROCHLORIDE 125 MG: KIT at 05:12

## 2023-12-24 RX ADMIN — PSYLLIUM HUSK 1 PACKET: 3.4 POWDER ORAL at 09:12

## 2023-12-24 RX ADMIN — ENOXAPARIN SODIUM 30 MG: 30 INJECTION SUBCUTANEOUS at 05:12

## 2023-12-24 RX ADMIN — SODIUM CHLORIDE: 9 INJECTION, SOLUTION INTRAVENOUS at 12:12

## 2023-12-24 RX ADMIN — Medication 10 ML: at 05:12

## 2023-12-24 RX ADMIN — MODAFINIL 200 MG: 100 TABLET ORAL at 05:12

## 2023-12-24 RX ADMIN — MAGNESIUM SULFATE HEPTAHYDRATE 2 G: 40 INJECTION, SOLUTION INTRAVENOUS at 09:12

## 2023-12-24 NOTE — PLAN OF CARE
Problem: Infection  Goal: Absence of Infection Signs and Symptoms  Outcome: Ongoing, Not Progressing     Problem: Adult Inpatient Plan of Care  Goal: Plan of Care Review  Outcome: Ongoing, Not Progressing  Goal: Patient-Specific Goal (Individualized)  Description: Pt. Will maintain sbp below 160  Outcome: Ongoing, Not Progressing  Goal: Absence of Hospital-Acquired Illness or Injury  Outcome: Ongoing, Not Progressing  Goal: Optimal Comfort and Wellbeing  Outcome: Ongoing, Not Progressing  Goal: Readiness for Transition of Care  Outcome: Ongoing, Not Progressing     Problem: Bariatric Environmental Safety  Goal: Safety Maintained with Care  Outcome: Ongoing, Not Progressing     Problem: Adjustment to Illness (Sepsis/Septic Shock)  Goal: Optimal Coping  Outcome: Ongoing, Not Progressing     Problem: Bleeding (Sepsis/Septic Shock)  Goal: Absence of Bleeding  Outcome: Ongoing, Not Progressing     Problem: Glycemic Control Impaired (Sepsis/Septic Shock)  Goal: Blood Glucose Level Within Desired Range  Outcome: Ongoing, Not Progressing     Problem: Infection Progression (Sepsis/Septic Shock)  Goal: Absence of Infection Signs and Symptoms  Outcome: Ongoing, Not Progressing     Problem: Nutrition Impaired (Sepsis/Septic Shock)  Goal: Optimal Nutrition Intake  Outcome: Ongoing, Not Progressing     Problem: Fluid and Electrolyte Imbalance (Acute Kidney Injury/Impairment)  Goal: Fluid and Electrolyte Balance  Outcome: Ongoing, Not Progressing     Problem: Oral Intake Inadequate (Acute Kidney Injury/Impairment)  Goal: Optimal Nutrition Intake  Outcome: Ongoing, Not Progressing     Problem: Renal Function Impairment (Acute Kidney Injury/Impairment)  Goal: Effective Renal Function  Outcome: Ongoing, Not Progressing     Problem: Impaired Wound Healing  Goal: Optimal Wound Healing  Outcome: Ongoing, Not Progressing     Problem: Skin Injury Risk Increased  Goal: Skin Health and Integrity  Outcome: Ongoing, Not Progressing      Problem: Fall Injury Risk  Goal: Absence of Fall and Fall-Related Injury  Outcome: Ongoing, Not Progressing     Problem: Restraint, Nonbehavioral (Nonviolent)  Goal: Absence of Harm or Injury  Outcome: Ongoing, Not Progressing

## 2023-12-24 NOTE — PLAN OF CARE
Problem: Infection  Goal: Absence of Infection Signs and Symptoms  Outcome: Ongoing, Progressing     Problem: Adult Inpatient Plan of Care  Goal: Plan of Care Review  Outcome: Ongoing, Progressing  Goal: Patient-Specific Goal (Individualized)  Description: Pt. Will maintain sbp below 160  Outcome: Ongoing, Progressing  Goal: Absence of Hospital-Acquired Illness or Injury  Outcome: Ongoing, Progressing  Goal: Optimal Comfort and Wellbeing  Outcome: Ongoing, Progressing  Goal: Readiness for Transition of Care  Outcome: Ongoing, Progressing     Problem: Bariatric Environmental Safety  Goal: Safety Maintained with Care  Outcome: Ongoing, Progressing     Problem: Adjustment to Illness (Sepsis/Septic Shock)  Goal: Optimal Coping  Outcome: Ongoing, Progressing     Problem: Bleeding (Sepsis/Septic Shock)  Goal: Absence of Bleeding  Outcome: Ongoing, Progressing     Problem: Glycemic Control Impaired (Sepsis/Septic Shock)  Goal: Blood Glucose Level Within Desired Range  Outcome: Ongoing, Progressing     Problem: Infection Progression (Sepsis/Septic Shock)  Goal: Absence of Infection Signs and Symptoms  Outcome: Ongoing, Progressing     Problem: Nutrition Impaired (Sepsis/Septic Shock)  Goal: Optimal Nutrition Intake  Outcome: Ongoing, Progressing     Problem: Fluid and Electrolyte Imbalance (Acute Kidney Injury/Impairment)  Goal: Fluid and Electrolyte Balance  Outcome: Ongoing, Progressing     Problem: Oral Intake Inadequate (Acute Kidney Injury/Impairment)  Goal: Optimal Nutrition Intake  Outcome: Ongoing, Progressing     Problem: Renal Function Impairment (Acute Kidney Injury/Impairment)  Goal: Effective Renal Function  Outcome: Ongoing, Progressing     Problem: Impaired Wound Healing  Goal: Optimal Wound Healing  Outcome: Ongoing, Progressing     Problem: Skin Injury Risk Increased  Goal: Skin Health and Integrity  Outcome: Ongoing, Progressing     Problem: Fall Injury Risk  Goal: Absence of Fall and Fall-Related  Injury  Outcome: Ongoing, Progressing     Problem: Restraint, Nonbehavioral (Nonviolent)  Goal: Absence of Harm or Injury  Outcome: Ongoing, Progressing

## 2023-12-24 NOTE — PROGRESS NOTES
Pharmacokinetic Assessment Follow Up: IV Vancomycin    Vancomycin serum concentration assessment(s):    The random level was drawn correctly and can be used to guide therapy at this time. The measurement is within the desired definitive target range of 15 to 20 mcg/mL.    Vancomycin Regimen Plan:    Change regimen to Vancomycin 1000  mg IV every 24 hours with next serum trough concentration measured at 60 prior to 3rd dose on 12/26/23    Drug levels (last 3 results):  Recent Labs   Lab Result Units 12/22/23 0433 12/23/23 0426 12/24/23  0936   Vancomycin, Random ug/mL 24.6 19.8 17.8       Pharmacy will continue to follow and monitor vancomycin.    Please contact pharmacy at extension [phone] for questions regarding this assessment.    Thank you for the consult,   Lydia Lemus       Patient brief summary:  Bj Pate Jr. is a 73 y.o. male initiated on antimicrobial therapy with IV Vancomycin for treatment of bone/joint infection    Drug Allergies:   Review of patient's allergies indicates:   Allergen Reactions    Tomato (solanum lycopersicum) Hives    Naproxen Hives    Shrimp Other (See Comments)       Actual Body Weight:   119.7    Renal Function:   Estimated Creatinine Clearance: 96.2 mL/min (based on SCr of 0.9 mg/dL).,     Dialysis Method (if applicable):  N/A    CBC (last 72 hours):  Recent Labs   Lab Result Units 12/22/23 0433 12/23/23 0426 12/24/23  0731   WBC K/uL 29.53* 26.44* 16.34*   Hemoglobin g/dL 7.3* 7.1* 8.4*   Hematocrit % 22.2* 22.6* 25.9*   Platelets K/uL 469* 471* 371   Gran % % 80.1* 83.5* 83.8*   Lymph % % 5.0* 4.0* 4.0*   Mono % % 9.6 8.7 9.6   Eosinophil % % 0.4 0.5 0.4   Basophil % % 0.2 0.2 0.1   Differential Method  Automated Automated Automated       Metabolic Panel (last 72 hours):  Recent Labs   Lab Result Units 12/22/23  0433 12/23/23 0426 12/24/23  0731   Sodium mmol/L 141 144 145   Potassium mmol/L 3.8 3.7 2.9*   Chloride mmol/L 102 105 110   CO2 mmol/L 28 27 26   Glucose  mg/dL 127* 64* 95   BUN mg/dL 58* 59* 47*   Creatinine mg/dL 1.0 1.3 0.9   Albumin g/dL 1.4* 1.2* 1.0*   Total Bilirubin mg/dL 0.7 1.1* 1.2*   Alkaline Phosphatase U/L 281* 232* 241*   AST U/L 354* 240* 587*   ALT U/L 296* 220* 308*   Magnesium mg/dL 1.8 1.9 1.7   Phosphorus mg/dL 2.1* 2.9 2.3*       Vancomycin Administrations:  vancomycin given in the last 96 hours                     vancomycin 125 mg/5 mL oral solution 125 mg (mg) 125 mg Given 12/24/23 0643     125 mg Given  0010     125 mg Given 12/23/23 1749    vancomycin 750 mg in dextrose 5 % (D5W) 250 mL IVPB (Vial-Mate) (mg) 750 mg New Bag 12/23/23 0914                    Microbiologic Results:  Microbiology Results (last 7 days)       Procedure Component Value Units Date/Time    Culture, Anaerobe [[id number]] Collected: 12/15/23 1657    Order Status: Completed Specimen: Wound from Back Updated: 12/22/23 0739     Anaerobic Culture No anaerobes isolated    Narrative:      3.) 2,3 Disc space    Culture, Anaerobe [[id number]] Collected: 12/15/23 1603    Order Status: Completed Specimen: Wound from Back Updated: 12/22/23 0739     Anaerobic Culture No anaerobes isolated    Narrative:      Paraspinal    Culture, Anaerobe [[id number]] Collected: 12/15/23 1655    Order Status: Completed Specimen: Wound from Back Updated: 12/22/23 0739     Anaerobic Culture No anaerobes isolated    Narrative:      2.) 2,3 Disc space    Culture, Anaerobic [[id number]] Collected: 12/13/23 1703    Order Status: Completed Specimen: Abscess from Buttocks, Left Updated: 12/21/23 0743     Anaerobic Culture No anaerobes isolated    Fungus culture [[id number]] Collected: 12/15/23 1657    Order Status: Completed Specimen: Wound from Back Updated: 12/20/23 1357     Fungus (Mycology) Culture Culture in progress    Narrative:      3.) 2,3 Disc space    Fungus culture [[id number]] Collected: 12/15/23 1603    Order Status: Completed Specimen: Wound from Back Updated: 12/20/23 7559     Fungus  (Mycology) Culture Culture in progress    Narrative:      Paraspinal    Fungus culture [[id number]] Collected: 12/15/23 1655    Order Status: Completed Specimen: Wound from Back Updated: 12/20/23 1357     Fungus (Mycology) Culture Culture in progress    Narrative:      2.) 2,3 Disc space    Fungus culture [[id number]] Collected: 12/13/23 1703    Order Status: Completed Specimen: Abscess from Buttocks, Left Updated: 12/20/23 1312     Fungus (Mycology) Culture Culture in progress    AFB Culture & Smear [[id number]] Collected: 12/15/23 1655    Order Status: Completed Specimen: Wound from Back Updated: 12/18/23 1436     AFB Culture & Smear Culture in progress     AFB CULTURE STAIN No acid fast bacilli seen.    Narrative:      2.) 2,3 Disc space    AFB Culture & Smear [[id number]] Collected: 12/15/23 1657    Order Status: Completed Specimen: Wound from Back Updated: 12/18/23 1436     AFB Culture & Smear Culture in progress     AFB CULTURE STAIN No acid fast bacilli seen.    Narrative:      3.) 2,3 Disc space    AFB Culture & Smear [[id number]] Collected: 12/15/23 1603    Order Status: Completed Specimen: Wound from Back Updated: 12/18/23 1436     AFB Culture & Smear Culture in progress     AFB CULTURE STAIN No acid fast bacilli seen.    Narrative:      Paraspinal    Aerobic culture [[id number]] Collected: 12/15/23 1655    Order Status: Completed Specimen: Wound from Back Updated: 12/18/23 0915     Aerobic Bacterial Culture No growth    Narrative:      2.) 2,3 Disc space    Aerobic culture [[id number]] Collected: 12/15/23 1603    Order Status: Completed Specimen: Wound from Back Updated: 12/18/23 0915     Aerobic Bacterial Culture No growth    Narrative:      Paraspinal    Aerobic culture [[id number]] Collected: 12/15/23 1657    Order Status: Completed Specimen: Wound from Back Updated: 12/18/23 0913     Aerobic Bacterial Culture No growth    Narrative:      3.) 2,3 Disc space

## 2023-12-24 NOTE — CARE UPDATE
RAPID RESPONSE NURSE ROUND       Rounding completed with charge RN, Gloria for soft BP with tachycardia reports pt stable at baseline. No additional concerns verbalized at this time. Instructed to call [phone] for further concerns or assistance.

## 2023-12-24 NOTE — PROGRESS NOTES
Connor Mina - Neurosurgery ([facility])  Neurosurgery  Progress Note    Subjective:     History of Present Illness: Mr. Pate is a 73M w/ hx CVA, CKD3, HTN, DM2, COPD who presents with increasing lethargy and confusion. Per ED team, patient had 3 weeks of low back pain, with a fall yesterday leading to his presentation. He had subjective lower extremity weakness and urinary retention after the fall. Septic on presentation to ED. Too altered to meaningfully participate in spine motor exam at time of NSGY evaluation.     Post-Op Info:  Procedure(s) (LRB):  L-1 TO L-4 FUSION, SPINE, LUMBAR, TLIF, POSTERIOR APPROACH, USING PEDICLE SCREW (N/A)   9 Days Post-Op   Interval History: 12/24: NAEON. Exam stable. Still pending post op XR     Medications:  Continuous Infusions:   sodium chloride 0.9%       Scheduled Meds:   colchicine  0.6 mg Oral Daily    enoxparin  30 mg Subcutaneous Q24H (prophylaxis, 1700)    fluticasone furoate-vilanteroL  1 puff Inhalation Daily    magnesium sulfate IVPB  2 g Intravenous Once    meropenem (MERREM) IVPB  2 g Intravenous Q8H    modafiniL  200 mg Per NG tube Before breakfast    potassium chloride  10 mEq Intravenous Once    psyllium husk (aspartame)  1 packet Per NG tube BID    sodium chloride 0.9%  10 mL Intravenous Q6H    vancomycin  125 mg Oral Q6H     PRN Meds:dextrose 10%, dextrose 10%, glucagon (human recombinant), insulin aspart U-100, ondansetron, Flushing PICC/Midline Protocol **AND** sodium chloride 0.9% **AND** sodium chloride 0.9%, Pharmacy to dose Vancomycin consult **AND** vancomycin - pharmacy to dose     Review of Systems  Objective:     Weight: 119.7 kg (264 lb)  Body mass index is 38.99 kg/m².  Vital Signs (Most Recent):  Temp: 98.5 °F (36.9 °C) (12/24/23 0729)  Pulse: 102 (12/24/23 0920)  Resp: 18 (12/24/23 0920)  BP: 119/65 (12/24/23 0729)  SpO2: 96 % (12/24/23 0729) Vital Signs (24h Range):  Temp:  [97.6 °F (36.4 °C)-99 °F (37.2 °C)] 98.5 °F (36.9 °C)  Pulse:  [102-115]  102  Resp:  [16-22] 18  SpO2:  [93 %-100 %] 96 %  BP: ()/(51-65) 119/65                                NG/OG Tube 12/14/23 0800 nasogastric Left nostril (Active)   Placement Check placement verified by x-ray 12/23/23 0600   Tolerance no signs/symptoms of discomfort 12/23/23 0600   Securement secured to nostril center w/ adhesive device 12/22/23 0750   Clamp Status/Tolerance clamped 12/23/23 0600   Suction Setting/Drainage Method suction at the bedside 12/23/23 0600   Insertion Site Appearance no redness, warmth, tenderness, skin breakdown, drainage 12/23/23 0600   Drainage None 12/23/23 0600   Flush/Irrigation flushed w/;water 12/23/23 0600   Feeding Type continuous 12/22/23 0750   Feeding Action feeding held 12/22/23 0920   Current Rate (mL/hr) 50 mL/hr 12/22/23 0750   Goal Rate (mL/hr) 50 mL/hr 12/22/23 0750   Intake (mL) 200 mL 12/22/23 2100   Water Bolus (mL) 250 mL 12/22/23 0920   Tube Output(mL)(Include Discarded Residual) 0 mL 12/15/23 0006   Formula Name impact peptide 1.5 12/22/23 0750   Intake (mL) - Formula Tube Feeding 50 12/22/23 0500       Male External Urinary Catheter 12/21/23 1015 Small (Active)   Collection Container Standard drainage bag 12/22/23 0750   Skin no breakdown;no redness 12/23/23 0600   Tolerance no signs/symptoms of discomfort 12/23/23 0600   Output (mL) 360 mL 12/23/23 0926   Catheter Change Date 12/23/23 12/23/23 1100   Catheter Change Time 1100 12/23/23 1100            Fecal Incontinence  12/20/23 1332 (Active)   $ Fecal Management Supplies Fecal Management System (Supply) 12/21/23 1525   Application fecal incontinence  changed 12/21/23 1525   Drainage Method attached to drainage bag 12/23/23 1100   Securement to gravity 12/20/23 2000   Skin breakdown;reddened;cleansed, skin barrier applied 12/23/23 1100   Tolerance no signs/symptoms of discomfort 12/23/23 1100   Stool (mL) 100 mL 12/23/23 0909     Physical Exam  General: well developed, well nourished, no  distress.   Head: normocephalic, atraumatic  Mental Status: Awake, Alert, Oriented  Speech: Clear with content appropriate to conversation  Cranial nerves: CN II-XII grossly intact.   Sensory: intact to light touch throughout, diffusely tender in the lower extremities    Motor Strength:   BUE 2/5 throughout, edematous, painful to touch. BLE 2/5 proximally, 3/5 distally. No abnormal movements seen.     Clonus: absent       Significant Labs:  Recent Labs   Lab 12/23/23  0426 12/24/23  0731   GLU 64* 95    145   K 3.7 2.9*    110   CO2 27 26   BUN 59* 47*   CREATININE 1.3 0.9   CALCIUM 9.2 8.3*   MG 1.9 1.7       Recent Labs   Lab 12/23/23  0426 12/24/23  0731   WBC 26.44* 16.34*   HGB 7.1* 8.4*   HCT 22.6* 25.9*   * 371       Recent Labs   Lab 12/22/23  1205 12/23/23  0426   INR 1.0 1.1       Microbiology Results (last 7 days)       Procedure Component Value Units Date/Time    Culture, Anaerobe [[id number]] Collected: 12/15/23 1657    Order Status: Completed Specimen: Wound from Back Updated: 12/22/23 0739     Anaerobic Culture No anaerobes isolated    Narrative:      3.) 2,3 Disc space    Culture, Anaerobe [[id number]] Collected: 12/15/23 1603    Order Status: Completed Specimen: Wound from Back Updated: 12/22/23 0739     Anaerobic Culture No anaerobes isolated    Narrative:      Paraspinal    Culture, Anaerobe [[id number]] Collected: 12/15/23 1655    Order Status: Completed Specimen: Wound from Back Updated: 12/22/23 0739     Anaerobic Culture No anaerobes isolated    Narrative:      2.) 2,3 Disc space    Culture, Anaerobic [[id number]] Collected: 12/13/23 1703    Order Status: Completed Specimen: Abscess from Buttocks, Left Updated: 12/21/23 0743     Anaerobic Culture No anaerobes isolated    Fungus culture [[id number]] Collected: 12/15/23 1657    Order Status: Completed Specimen: Wound from Back Updated: 12/20/23 1357     Fungus (Mycology) Culture Culture in progress    Narrative:      3.) 2,3  Disc space    Fungus culture [[id number]] Collected: 12/15/23 1603    Order Status: Completed Specimen: Wound from Back Updated: 12/20/23 1357     Fungus (Mycology) Culture Culture in progress    Narrative:      Paraspinal    Fungus culture [[id number]] Collected: 12/15/23 1655    Order Status: Completed Specimen: Wound from Back Updated: 12/20/23 1357     Fungus (Mycology) Culture Culture in progress    Narrative:      2.) 2,3 Disc space    Fungus culture [[id number]] Collected: 12/13/23 1703    Order Status: Completed Specimen: Abscess from Buttocks, Left Updated: 12/20/23 1312     Fungus (Mycology) Culture Culture in progress    AFB Culture & Smear [[id number]] Collected: 12/15/23 1655    Order Status: Completed Specimen: Wound from Back Updated: 12/18/23 1436     AFB Culture & Smear Culture in progress     AFB CULTURE STAIN No acid fast bacilli seen.    Narrative:      2.) 2,3 Disc space    AFB Culture & Smear [[id number]] Collected: 12/15/23 1657    Order Status: Completed Specimen: Wound from Back Updated: 12/18/23 1436     AFB Culture & Smear Culture in progress     AFB CULTURE STAIN No acid fast bacilli seen.    Narrative:      3.) 2,3 Disc space    AFB Culture & Smear [[id number]] Collected: 12/15/23 1603    Order Status: Completed Specimen: Wound from Back Updated: 12/18/23 1436     AFB Culture & Smear Culture in progress     AFB CULTURE STAIN No acid fast bacilli seen.    Narrative:      Paraspinal    Aerobic culture [[id number]] Collected: 12/15/23 1655    Order Status: Completed Specimen: Wound from Back Updated: 12/18/23 0915     Aerobic Bacterial Culture No growth    Narrative:      2.) 2,3 Disc space    Aerobic culture [[id number]] Collected: 12/15/23 1603    Order Status: Completed Specimen: Wound from Back Updated: 12/18/23 0915     Aerobic Bacterial Culture No growth    Narrative:      Paraspinal    Aerobic culture [[id number]] Collected: 12/15/23 1657    Order Status: Completed Specimen: Wound  from Back Updated: 12/18/23 0913     Aerobic Bacterial Culture No growth    Narrative:      3.) 2,3 Disc space          All pertinent labs from the last 24 hours have been reviewed.    Significant Diagnostics:  No results found in the last 24 hours.    Assessment/Plan:     * Acute osteomyelitis of lumbar spine  A 73M w/ hx CVA, CKD3, HTN, DM2, and COPD who presents with AMS likely 2/2 underlying sepsis as well as progressive back pain and inability to ambulate for the past 2 weeks due to progressive L2-L3 osteodiscitis and presumed mechanical instability.     CTH 12/11: lacunar infarcts   MRI w/wo L sp 12/11: severe central stenosis L4-5, discitis osteomyelitis at L2-3, L psoas abscess up to 2 cm  MRI C/T w/wo: very poor with alot of motion artifact, but no OM else where   US BLE 12/12: negative  12/10: , CRP >120  BCx NGTD  MRI brain, Csp, CTH 12/16: nondiagnostic  MRI C/T/L w/wo 12/16 without contribution to picture of fluctuating sensorium and motor exam; severe spondylosis in Csp with some STIR and contrast enhancement prevertebral and possibly early osteodiscitis. MRI Tsp without concerning findings and MRI Lsp with expected postop changes.      Now s/p L1-L4 decompression, L2-L3 TLIF and L1-L4 PSF on 12/15    Subaxial cervical spondylosis which may require PCF in an outpatient setting  Encephalopathy is improving.   Continue multimodal pain medication   Turn every 2 hours to promote wound healing. Keep area clean and dry.   HV and WV removed 12/20   LSO brace when OOB  Intraoperative cultures NGTD, continue abx per ID  DVT ppx   PT/OT and OOB   Obtain post-op XR lumbar spine. Ok to obtain supine.   Rest of care per primary   Please notify neurosurgery on call for any acute neurologic change.            Juan Pablo Leal MD  Neurosurgery  Connor karin - Neurosurgery ([facility])

## 2023-12-24 NOTE — PROGRESS NOTES
Connor Mina - Neurosurgery ([facility])  [facility]  Progress Note    Patient Name: Bj Pate Jr.  MRN: [id number]  Patient Class: IP- Inpatient   Admission Date: 12/10/2023  Length of Stay: 13 days  Attending Physician: Christ Rudd MD  Primary Care Provider: Jose Antonio Foster MD        Subjective:     Principal Problem:Acute osteomyelitis of lumbar spine        HPI:  This is a 73M with a h/o CVA  about 8 to 10 years ago with R sided deficits, DM, HTN who was BIB from home with family with 3 weeks of abdominal pain. Seen at multiple EDS, and generally unremarkable work up as per family. Within last few days started to have progressive worsening back pain. Became acute altered 2 days ago w/, LE weakness now requiring assistance to walk, as well as urinary incontinence. Patient is A/O 0x at admit. Accompanied by Daughter, most of history from her.     Admission to ED patient was febrile at 102 F, tachycardic 130, tachypneic at 23, hypertensive at 140/72 initially satting well on room air later requiring 2 L nasal cannula.  WBCs elevated 12.74 anemia 11.0 platelets normal, sed rate 120+, BUN 27 magnesium 1.5, albumin 2.4, .1, troponin 0.126 urine trace proteinuria trace ketones lactate initially elevated 2.6 later 1.3 CT head unremarkable for acute processes CT abdomen and pelvis and MRI lumbar spine shows left psoas abscess L2-L3 osteomyleitis, and diskitis.  Neurosurgery was consulted recommendations are pending.  Received Cefepime and Vanc    Overview/Hospital Course:  Mr. Pate was admitted for abdominal pain and worsening mental status, and was found to have a collection in his left psoas muscle, diskitis, and osteomyelitis in the L2-L3 region.  He was placed on vancomycin and meropenem for continuing decline in mental status.  Interventional Radiology took a core biopsy of infected disc, but were unable to aspirate anything from the left psoas collection. Due to his poor mental status, an VITOR  tube and tube feedings were ordered to assist with his nutrition status. On 12/15 he returned to his mental baseline and was alert and having conversations prior to an L1-L4 fusion and decompression with neurosurgery. Was admitted to [facility] for post operative monitoring following a L1-L4 segmental posterolateral fusion, L2-L4 laminectomy and L2-L3 TLIF for L2-L4 discitis/osteomyelitis on 12/15: Went for CTA because of lethargy since surgery. Did not tolerate procedure, returned to [facility]. Sedated and intubated for MRI pan spine, as patient had post surgical changes in exam. Continuing Vanc and Deo. MRI with stable findings except for typical post-operative findings. No further surgical plans per NSGY. Provigil given to improve wakefulness, will start daily. Extubated 11/17/2023. 12/19 PICC consulted placed for long-term IV antibiotic administration. Lasix 40mg IV given for BUE swelling. US extremities ordered to rule out DVT. Showed superficial thrombophlebitis of the right cephalic vein.  Had acute transaminitis noted on 12/21 and hepatocellular pattern.  CT abdomen and ultrasound Doppler negative for any inexplicable hepatobiliary pathology.  Spontaneous improvement.  Negative viral hepatitis panel    Interval History:  Stable haptoglobin and bilirubin, arguing against hemo lysis process.  AST/ALT climbing again, hepatology feels could be hypoperfusion of the liver.  Patient denies any abdominal pain, no chest pain.  Sinus tach noted on EKG per my personal review    Review of Systems  Objective:     Vital Signs (Most Recent):  Temp: 98.5 °F (36.9 °C) (12/24/23 0729)  Pulse: 103 (12/24/23 1128)  Resp: 18 (12/24/23 0920)  BP: 119/65 (12/24/23 0729)  SpO2: 96 % (12/24/23 0729) Vital Signs (24h Range):  Temp:  [97.6 °F (36.4 °C)-99 °F (37.2 °C)] 98.5 °F (36.9 °C)  Pulse:  [102-115] 103  Resp:  [16-22] 18  SpO2:  [93 %-97 %] 96 %  BP: ()/(51-65) 119/65     Weight: 119.7 kg (264 lb)  Body mass index is 38.99  kg/m².    Intake/Output Summary (Last 24 hours) at 12/24/2023 1150  Last data filed at 12/24/2023 0830  Gross per 24 hour   Intake 170 ml   Output 1000 ml   Net -830 ml         Physical Exam      Clear lungs bilaterally, unlabored breathing, on room air, no cyanosis    slightly tachycardic heart sounds,  No babar lower extremity edema   Mild unchanged edema in hands, left greater than right, mild diffuse tenderness to palpation in both hands, does not follow motor commands sustainably, does awake on both feet  Pupils equal bilaterally, has difficulty maintaining attention    Assessment/Plan:      * Acute osteomyelitis of lumbar spine  Psoas abscess  Was admitted to [facility] for post operative monitoring following a L1-L4 segmental posterolateral fusion, L2-L4 laminectomy and L2-L3 TLIF for L2-L4 discitis/osteomyelitis on 12/15.   S/p IR biopsy of psoas abscess and L2-L3 disc space.     -Continue  Meropenem and Vancomycin per ID recs    Discitis  See severe sepsis    Acute metabolic encephalopathy  Septic encephalopathy  delirium  Pain  polypharmacy    Neurology consulted, low suspicion for seizures, possibly gout flare per their assessment but this seems unlikely given his presentation and MRI/CT findings  Placed on provigil due to somnolence    Transaminitis  Acute bump noted  700s --> 300s --> 200s --> 500s; concern for hypoperfusion of the liver per hepatology  Stable bilirubin; no N/V  No obvious DILI per pharm review  unremarkable Ammonia, hep viral panel, INR, CPK, GGT, US abd, CT abd findings  IVFs  Serological workup in process per hepatology consult,   Was stable haptoglobin  and bilirubin hematology input feels no hemolysis is at play      Acute on chronic systolic heart failure  Echo 12/10/2023    Interpretation Summary    Technically difficult portable study    Left Ventricle: The left ventricle is normal in size. There is concentric remodeling. Mild global hypokinesis present. There is mildly reduced  systolic function with a visually estimated ejection fraction of 45 - 50%.    Right Ventricle: Normal right ventricular cavity size. Wall thickness is normal. Right ventricle wall motion  is normal. Systolic function is normal.    Pulmonary Artery: The estimated pulmonary artery systolic pressure is at least 24 mmHg.    The IVC was not well visualized.    Lasix and losartan paused in light of acute transaminitis;     Leukocytosis  Could be related to transaminitis vs infx  F/u abd CT abd  Continue Invanz      Pain of left hand  Maybe gout?   Continue colchicine trial    Thrombophlebitis  RUE cephalic vein thrombus      Hemiparesis  History of R sided hemiparesis from prior CVA. Mostly resolved per family.  PT/OT     Stercoral colitis  CT AP w rectal colitis vs constipation    - brown bomb on 12/14 with large bowel movement  - Aggressive Bowel regimen after surgery    Back pain  Likely secondary to L2-L3 diskitis/ostemyelitis.    MRI spine completed  NSGY consulted. Surgery today for L1-L4 fusion and decompression  Pain meds PRN    Psoas muscle abscess  See severe sepsis    Malnutrition  Maintenance IVFs  NG tube placement ordered  RDN consulted, appreciate recs  Tube feeds held for surgery today    Urinary retention  Rivera placed by ED, PVR now that he has external condom catheter. Due to diskitis/osteomyelitis, concern for possible spinal cord compression.    Bladder scan as needed  Document PVRs    History of CVA (cerebrovascular accident)  -R thalamic, 2016  No acute processes on head CT or brain MRI      VTE Risk Mitigation (From admission, onward)           Ordered     enoxaparin injection 30 mg  Every 24 hours         12/22/23 0827     IP VTE HIGH RISK PATIENT  Once         12/11/23 0652     Place sequential compression device  Until discontinued         12/11/23 0652                    Discharge Planning   GLORIA: 12/26/2023     Code Status: Full Code   Is the patient medically ready for discharge?: No    Reason  for patient still in hospital (select all that apply): Patient trending condition, Laboratory test, Treatment, and Consult recommendations  Discharge Plan A: Long-term acute care facility (LTAC)   Discharge Delays: None known at this time              Christ Rudd MD  Department of Hospital Medicine   [facility] - Neurosurgery ([facility])

## 2023-12-24 NOTE — SUBJECTIVE & OBJECTIVE
Interval History: 12/24: NAEON. Exam stable. Still pending post op XR     Medications:  Continuous Infusions:   sodium chloride 0.9%       Scheduled Meds:   colchicine  0.6 mg Oral Daily    enoxparin  30 mg Subcutaneous Q24H (prophylaxis, 1700)    fluticasone furoate-vilanteroL  1 puff Inhalation Daily    magnesium sulfate IVPB  2 g Intravenous Once    meropenem (MERREM) IVPB  2 g Intravenous Q8H    modafiniL  200 mg Per NG tube Before breakfast    potassium chloride  10 mEq Intravenous Once    psyllium husk (aspartame)  1 packet Per NG tube BID    sodium chloride 0.9%  10 mL Intravenous Q6H    vancomycin  125 mg Oral Q6H     PRN Meds:dextrose 10%, dextrose 10%, glucagon (human recombinant), insulin aspart U-100, ondansetron, Flushing PICC/Midline Protocol **AND** sodium chloride 0.9% **AND** sodium chloride 0.9%, Pharmacy to dose Vancomycin consult **AND** vancomycin - pharmacy to dose     Review of Systems  Objective:     Weight: 119.7 kg (264 lb)  Body mass index is 38.99 kg/m².  Vital Signs (Most Recent):  Temp: 98.5 °F (36.9 °C) (12/24/23 0729)  Pulse: 102 (12/24/23 0920)  Resp: 18 (12/24/23 0920)  BP: 119/65 (12/24/23 0729)  SpO2: 96 % (12/24/23 0729) Vital Signs (24h Range):  Temp:  [97.6 °F (36.4 °C)-99 °F (37.2 °C)] 98.5 °F (36.9 °C)  Pulse:  [102-115] 102  Resp:  [16-22] 18  SpO2:  [93 %-100 %] 96 %  BP: ()/(51-65) 119/65                                NG/OG Tube 12/14/23 0800 nasogastric Left nostril (Active)   Placement Check placement verified by x-ray 12/23/23 0600   Tolerance no signs/symptoms of discomfort 12/23/23 0600   Securement secured to nostril center w/ adhesive device 12/22/23 0750   Clamp Status/Tolerance clamped 12/23/23 0600   Suction Setting/Drainage Method suction at the bedside 12/23/23 0600   Insertion Site Appearance no redness, warmth, tenderness, skin breakdown, drainage 12/23/23 0600   Drainage None 12/23/23 0600   Flush/Irrigation flushed w/;water 12/23/23 0600   Feeding  Type continuous 12/22/23 0750   Feeding Action feeding held 12/22/23 0920   Current Rate (mL/hr) 50 mL/hr 12/22/23 0750   Goal Rate (mL/hr) 50 mL/hr 12/22/23 0750   Intake (mL) 200 mL 12/22/23 2100   Water Bolus (mL) 250 mL 12/22/23 0920   Tube Output(mL)(Include Discarded Residual) 0 mL 12/15/23 0006   Formula Name impact peptide 1.5 12/22/23 0750   Intake (mL) - Formula Tube Feeding 50 12/22/23 0500       Male External Urinary Catheter 12/21/23 1015 Small (Active)   Collection Container Standard drainage bag 12/22/23 0750   Skin no breakdown;no redness 12/23/23 0600   Tolerance no signs/symptoms of discomfort 12/23/23 0600   Output (mL) 360 mL 12/23/23 0926   Catheter Change Date 12/23/23 12/23/23 1100   Catheter Change Time 1100 12/23/23 1100            Fecal Incontinence  12/20/23 1332 (Active)   $ Fecal Management Supplies Fecal Management System (Supply) 12/21/23 1525   Application fecal incontinence  changed 12/21/23 1525   Drainage Method attached to drainage bag 12/23/23 1100   Securement to gravity 12/20/23 2000   Skin breakdown;reddened;cleansed, skin barrier applied 12/23/23 1100   Tolerance no signs/symptoms of discomfort 12/23/23 1100   Stool (mL) 100 mL 12/23/23 0926     Physical Exam  General: well developed, well nourished, no distress.   Head: normocephalic, atraumatic  Mental Status: Awake, Alert, Oriented  Speech: Clear with content appropriate to conversation  Cranial nerves: CN II-XII grossly intact.   Sensory: intact to light touch throughout, diffusely tender in the lower extremities    Motor Strength:   BUE 2/5 throughout, edematous, painful to touch. BLE 2/5 proximally, 3/5 distally. No abnormal movements seen.     Clonus: absent       Significant Labs:  Recent Labs   Lab 12/23/23  0426 12/24/23  0731   GLU 64* 95    145   K 3.7 2.9*    110   CO2 27 26   BUN 59* 47*   CREATININE 1.3 0.9   CALCIUM 9.2 8.3*   MG 1.9 1.7       Recent Labs   Lab 12/23/23  5710  12/24/23  0731   WBC 26.44* 16.34*   HGB 7.1* 8.4*   HCT 22.6* 25.9*   * 371       Recent Labs   Lab 12/22/23  1205 12/23/23  0426   INR 1.0 1.1       Microbiology Results (last 7 days)       Procedure Component Value Units Date/Time    Culture, Anaerobe [[id number]] Collected: 12/15/23 1657    Order Status: Completed Specimen: Wound from Back Updated: 12/22/23 0739     Anaerobic Culture No anaerobes isolated    Narrative:      3.) 2,3 Disc space    Culture, Anaerobe [[id number]] Collected: 12/15/23 1603    Order Status: Completed Specimen: Wound from Back Updated: 12/22/23 0739     Anaerobic Culture No anaerobes isolated    Narrative:      Paraspinal    Culture, Anaerobe [[id number]] Collected: 12/15/23 1655    Order Status: Completed Specimen: Wound from Back Updated: 12/22/23 0739     Anaerobic Culture No anaerobes isolated    Narrative:      2.) 2,3 Disc space    Culture, Anaerobic [[id number]] Collected: 12/13/23 1703    Order Status: Completed Specimen: Abscess from Buttocks, Left Updated: 12/21/23 0743     Anaerobic Culture No anaerobes isolated    Fungus culture [[id number]] Collected: 12/15/23 1657    Order Status: Completed Specimen: Wound from Back Updated: 12/20/23 1357     Fungus (Mycology) Culture Culture in progress    Narrative:      3.) 2,3 Disc space    Fungus culture [[id number]] Collected: 12/15/23 1603    Order Status: Completed Specimen: Wound from Back Updated: 12/20/23 1357     Fungus (Mycology) Culture Culture in progress    Narrative:      Paraspinal    Fungus culture [[id number]] Collected: 12/15/23 1655    Order Status: Completed Specimen: Wound from Back Updated: 12/20/23 1357     Fungus (Mycology) Culture Culture in progress    Narrative:      2.) 2,3 Disc space    Fungus culture [[id number]] Collected: 12/13/23 1703    Order Status: Completed Specimen: Abscess from Buttocks, Left Updated: 12/20/23 1312     Fungus (Mycology) Culture Culture in progress    AFB Culture & Smear  [[id number]] Collected: 12/15/23 1655    Order Status: Completed Specimen: Wound from Back Updated: 12/18/23 1436     AFB Culture & Smear Culture in progress     AFB CULTURE STAIN No acid fast bacilli seen.    Narrative:      2.) 2,3 Disc space    AFB Culture & Smear [[id number]] Collected: 12/15/23 1657    Order Status: Completed Specimen: Wound from Back Updated: 12/18/23 1436     AFB Culture & Smear Culture in progress     AFB CULTURE STAIN No acid fast bacilli seen.    Narrative:      3.) 2,3 Disc space    AFB Culture & Smear [[id number]] Collected: 12/15/23 1603    Order Status: Completed Specimen: Wound from Back Updated: 12/18/23 1436     AFB Culture & Smear Culture in progress     AFB CULTURE STAIN No acid fast bacilli seen.    Narrative:      Paraspinal    Aerobic culture [[id number]] Collected: 12/15/23 1655    Order Status: Completed Specimen: Wound from Back Updated: 12/18/23 0915     Aerobic Bacterial Culture No growth    Narrative:      2.) 2,3 Disc space    Aerobic culture [[id number]] Collected: 12/15/23 1603    Order Status: Completed Specimen: Wound from Back Updated: 12/18/23 0915     Aerobic Bacterial Culture No growth    Narrative:      Paraspinal    Aerobic culture [[id number]] Collected: 12/15/23 1657    Order Status: Completed Specimen: Wound from Back Updated: 12/18/23 0913     Aerobic Bacterial Culture No growth    Narrative:      3.) 2,3 Disc space          All pertinent labs from the last 24 hours have been reviewed.    Significant Diagnostics:  No results found in the last 24 hours.

## 2023-12-24 NOTE — ASSESSMENT & PLAN NOTE
Acute bump noted  700s --> 300s --> 200s --> 500s; concern for hypoperfusion of the liver per hepatology  Stable bilirubin; no N/V  No obvious DILI per pharm review  unremarkable Ammonia, hep viral panel, INR, CPK, GGT, US abd, CT abd findings  IVFs  Serological workup in process per hepatology consult,   Was stable haptoglobin  and bilirubin hematology input feels no hemolysis is at play

## 2023-12-24 NOTE — NURSING
Nurses Note -- 4 Eyes      12/24/2023   10:35 AM      Skin assessed during: Q Shift Change      [x] No Altered Skin Integrity Present    []Prevention Measures Documented      [] Yes- Altered Skin Integrity Present or Discovered   [] LDA Added if Not in Epic (Describe Wound)   [] New Altered Skin Integrity was Present on Admit and Documented in LDA   [] Wound Image Taken    Wound Care Consulted? No    Attending Nurse:  Adriana Lam RN/Staff Member:  Jeannie

## 2023-12-24 NOTE — PROGRESS NOTES
Hepatology Progress Note    Bj Pate Jr. is a 73 y.o. male admitted to hospital 12/10/2023 (Hospital Day: 15) due to Acute osteomyelitis of lumbar spine.     Interval History  Hemolytic workup negative in spite of schistocytes on peripheral smear. Per hematology, low concern for TTP given normal PLT count.     LFTs increased today: AST 200s>500s, ALT 200s>300s.     Objective  Temp:  [97.6 °F (36.4 °C)-99 °F (37.2 °C)] 97.9 °F (36.6 °C) (12/24 1223)  Pulse:  [102-115] 107 (12/24 1223)  BP: ()/(51-71) 108/65 (12/24 1223)  Resp:  [16-22] 20 (12/24 1217)  SpO2:  [93 %-100 %] 100 % (12/24 1223)    Constitutional:  Drowsy, ill appearing, but non-toxic, and well developed  HENT: Head: Normal, normocephalic, atraumatic.  NG tube in place.  Eyes: conjunctiva clear and sclera nonicteric  GI: soft, non-tender, without masses or organomegaly, nondistended, normal bowel sounds, without guarding, and without rebound  Skin: normal color and no jaundice  Neurological:  Drowsy but can be awakened, oriented x3  speech normal in context and clarity  memory intact grossly  Psychiatric: oriented to time, place and person, mood and affect are within normal limits, pt is a good historian; no memory problems were noted    Laboratory    Lab Results   Component Value Date    WBC 16.34 (H) 12/24/2023    HGB 8.4 (L) 12/24/2023    HCT 25.9 (L) 12/24/2023    MCV 90 12/24/2023     12/24/2023       Lab Results   Component Value Date     12/24/2023    K 2.9 (L) 12/24/2023     12/24/2023    CO2 26 12/24/2023    BUN 47 (H) 12/24/2023    CREATININE 0.9 12/24/2023    CALCIUM 8.3 (L) 12/24/2023       Lab Results   Component Value Date    ALBUMIN 1.0 (L) 12/24/2023     (H) 12/24/2023     (H) 12/24/2023     (H) 12/22/2023    ALKPHOS 241 (H) 12/24/2023    BILITOT 1.2 (H) 12/24/2023       Lab Results   Component Value Date    INR 1.1 12/24/2023    INR 1.1 12/23/2023    INR 1.0 12/22/2023          Assessment  Bj Pate Jr. is a 73 y.o. male with history of VA, CKD3, HTN, DM2, COPD admitted with increasing lethargy s/p fall found to have L psoas abscess/lumbar OM s/p IR aspiration of L2/3 disc space (cx ngtd) and washout with lumbar fusion/TLIF/laminectomy with NSGY on 12/15.  Required brief stay in MICU for hypotension.  On 12/21/23, had sudden spike in transaminases (peak , ). LFTs fluctuating. Hemolytic workup negative in spite of schistocytes on peripheral smear.     Suspect ischemic hepatitis in the setting of relative hypotension and sepsis.     Problem List:  Elevated LFTs (hepatocellular pattern), likely secondary to hypoperfusion  Moderate alcohol use    Plan  -  Continue to optimize infectious and other medical comorbidities   - please obtain daily CBC, CMP. Follow PEth.   - Plan of care was discussed with primary team    Thank you for involving us in the care of Bj Pate Jr.. Please call with any additional concerns or questions.    Jesús Lockett MD, PGY-V  Gastroenterology Fellow  [facility]

## 2023-12-24 NOTE — NURSING
Nurses Note -- 4 Eyes      12/23/2023   11:41 PM      Skin assessed during: Daily Assessment      [] No Altered Skin Integrity Present    []Prevention Measures Documented      [x] Yes- Altered Skin Integrity Present or Discovered   [] LDA Added if Not in Epic (Describe Wound)   [] New Altered Skin Integrity was Present on Admit and Documented in LDA   [] Wound Image Taken    No new wounds or skin issues     Wound Care Consulted? No    Attending Nurse:  Jeannie Lam RN/Staff Member:  BAILEE Rae and BAILEE Bruce

## 2023-12-24 NOTE — CARE UPDATE
"RAPID RESPONSE NURSE CHART REVIEW        Chart Reviewed: 12/24/2023, 8:32 AM    MRN: [id number]  Bed: 945/945 A    Dx: Acute osteomyelitis of lumbar spine    Bj Pate Jr. has a past medical history of Acute on chronic systolic heart failure, Anemia, Anticoagulant long-term use, Basal ganglia hemorrhage, Benign hypertension with CKD (chronic kidney disease) stage III, Cataract, Chronic idiopathic gout of multiple sites, Chronic kidney disease, stage 3, COPD (chronic obstructive pulmonary disease), Erectile dysfunction, Gout, Hemorrhoids without complication, Hyperlipidemia, Morbid obesity, Obstructive sleep apnea on CPAP, Reactive airway disease without complication, Severe sepsis, Stroke, Thalamic infarct, acute (right), and 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    Last VS: /65   Pulse (!) 115   Temp 98.5 °F (36.9 °C)   Resp 18   Ht 5' 9" (1.753 m)   Wt 119.7 kg (264 lb)   SpO2 96%   BMI 38.99 kg/m²     24H Vital Sign Range:  Temp:  [97.6 °F (36.4 °C)-99 °F (37.2 °C)]   Pulse:  [106-115]   Resp:  [16-22]   BP: ()/(51-65)   SpO2:  [93 %-100 %]     Level of Consciousness (AVPU): alert    Recent Labs     12/22/23  0433 12/23/23  0426 12/24/23  0731   WBC 29.53* 26.44* 16.34*   HGB 7.3* 7.1* 8.4*   HCT 22.2* 22.6* 25.9*   * 471* 371       Recent Labs     12/22/23  0433 12/23/23  0426 12/24/23  0731    144 145   K 3.8 3.7 2.9*    105 110   CO2 28 27 26   BUN 58* 59* 47*   CREATININE 1.0 1.3 0.9   * 64* 95   PHOS 2.1* 2.9 2.3*   MG 1.8 1.9 1.7        No results for input(s): "PH", "PCO2", "PO2", "HCO3", "POCSATURATED", "BE" in the last 72 hours.     OXYGEN:  Flow (L/min): 1  Oxygen Concentration (%): 21       MEWS score: 3    Rounding completed with charge RN Alfredo. Discussed tachycardia with HR in the 110s. K = 2.9. Dr. Rudd notified. No additional concerns verbalized at this time. Instructed to call [phone] for further concerns or assistance.    Shira" BAILEE Miller

## 2023-12-24 NOTE — SUBJECTIVE & OBJECTIVE
Interval History:  Stable haptoglobin and bilirubin, arguing against hemo lysis process.  AST/ALT climbing again, hepatology feels could be hypoperfusion of the liver.  Patient denies any abdominal pain, no chest pain.  Sinus tach noted on EKG per my personal review    Review of Systems  Objective:     Vital Signs (Most Recent):  Temp: 98.5 °F (36.9 °C) (12/24/23 0729)  Pulse: 103 (12/24/23 1128)  Resp: 18 (12/24/23 0920)  BP: 119/65 (12/24/23 0729)  SpO2: 96 % (12/24/23 0729) Vital Signs (24h Range):  Temp:  [97.6 °F (36.4 °C)-99 °F (37.2 °C)] 98.5 °F (36.9 °C)  Pulse:  [102-115] 103  Resp:  [16-22] 18  SpO2:  [93 %-97 %] 96 %  BP: ()/(51-65) 119/65     Weight: 119.7 kg (264 lb)  Body mass index is 38.99 kg/m².    Intake/Output Summary (Last 24 hours) at 12/24/2023 1150  Last data filed at 12/24/2023 0830  Gross per 24 hour   Intake 170 ml   Output 1000 ml   Net -830 ml         Physical Exam      Clear lungs bilaterally, unlabored breathing, on room air, no cyanosis    slightly tachycardic heart sounds,  No babar lower extremity edema   Mild unchanged edema in hands, left greater than right, mild diffuse tenderness to palpation in both hands, does not follow motor commands sustainably, does awake on both feet  Pupils equal bilaterally, has difficulty maintaining attention

## 2023-12-25 PROBLEM — R71.0: Status: ACTIVE | Noted: 2023-12-25

## 2023-12-25 LAB
ABO + RH BLD: NORMAL
ALBUMIN SERPL BCP-MCNC: 1 G/DL (ref 3.5–5.2)
ALP SERPL-CCNC: 243 U/L (ref 55–135)
ALT SERPL W/O P-5'-P-CCNC: 264 U/L (ref 10–44)
ANION GAP SERPL CALC-SCNC: 8 MMOL/L (ref 8–16)
AST SERPL-CCNC: 351 U/L (ref 10–40)
BASOPHILS # BLD AUTO: 0.02 K/UL (ref 0–0.2)
BASOPHILS # BLD AUTO: 0.05 K/UL (ref 0–0.2)
BASOPHILS NFR BLD: 0.1 % (ref 0–1.9)
BASOPHILS NFR BLD: 0.2 % (ref 0–1.9)
BILIRUB SERPL-MCNC: 0.8 MG/DL (ref 0.1–1)
BLD GP AB SCN CELLS X3 SERPL QL: NORMAL
BLD PROD TYP BPU: NORMAL
BLOOD UNIT EXPIRATION DATE: NORMAL
BLOOD UNIT TYPE CODE: 7300
BLOOD UNIT TYPE: NORMAL
BUN SERPL-MCNC: 48 MG/DL (ref 8–23)
CALCIUM SERPL-MCNC: 8.8 MG/DL (ref 8.7–10.5)
CHLORIDE SERPL-SCNC: 107 MMOL/L (ref 95–110)
CO2 SERPL-SCNC: 27 MMOL/L (ref 23–29)
CODING SYSTEM: NORMAL
CREAT SERPL-MCNC: 0.9 MG/DL (ref 0.5–1.4)
CROSSMATCH INTERPRETATION: NORMAL
DIFFERENTIAL METHOD BLD: ABNORMAL
DIFFERENTIAL METHOD BLD: ABNORMAL
DISPENSE STATUS: NORMAL
EOSINOPHIL # BLD AUTO: 0.1 K/UL (ref 0–0.5)
EOSINOPHIL # BLD AUTO: 0.1 K/UL (ref 0–0.5)
EOSINOPHIL NFR BLD: 0.6 % (ref 0–8)
EOSINOPHIL NFR BLD: 0.7 % (ref 0–8)
ERYTHROCYTE [DISTWIDTH] IN BLOOD BY AUTOMATED COUNT: 15.4 % (ref 11.5–14.5)
ERYTHROCYTE [DISTWIDTH] IN BLOOD BY AUTOMATED COUNT: 15.6 % (ref 11.5–14.5)
EST. GFR  (NO RACE VARIABLE): >60 ML/MIN/1.73 M^2
GLUCOSE SERPL-MCNC: 156 MG/DL (ref 70–110)
HCT VFR BLD AUTO: 20.3 % (ref 40–54)
HCT VFR BLD AUTO: 20.9 % (ref 40–54)
HGB BLD-MCNC: 6.6 G/DL (ref 14–18)
HGB BLD-MCNC: 6.7 G/DL (ref 14–18)
IMM GRANULOCYTES # BLD AUTO: 0.51 K/UL (ref 0–0.04)
IMM GRANULOCYTES # BLD AUTO: 0.61 K/UL (ref 0–0.04)
IMM GRANULOCYTES NFR BLD AUTO: 2.4 % (ref 0–0.5)
IMM GRANULOCYTES NFR BLD AUTO: 2.9 % (ref 0–0.5)
LYMPHOCYTES # BLD AUTO: 0.9 K/UL (ref 1–4.8)
LYMPHOCYTES # BLD AUTO: 1 K/UL (ref 1–4.8)
LYMPHOCYTES NFR BLD: 4.3 % (ref 18–48)
LYMPHOCYTES NFR BLD: 4.7 % (ref 18–48)
MAGNESIUM SERPL-MCNC: 1.9 MG/DL (ref 1.6–2.6)
MCH RBC QN AUTO: 28.6 PG (ref 27–31)
MCH RBC QN AUTO: 29.5 PG (ref 27–31)
MCHC RBC AUTO-ENTMCNC: 31.6 G/DL (ref 32–36)
MCHC RBC AUTO-ENTMCNC: 33 G/DL (ref 32–36)
MCV RBC AUTO: 89 FL (ref 82–98)
MCV RBC AUTO: 91 FL (ref 82–98)
MONOCYTES # BLD AUTO: 2 K/UL (ref 0.3–1)
MONOCYTES # BLD AUTO: 2 K/UL (ref 0.3–1)
MONOCYTES NFR BLD: 9.3 % (ref 4–15)
MONOCYTES NFR BLD: 9.5 % (ref 4–15)
NEUTROPHILS # BLD AUTO: 17.1 K/UL (ref 1.8–7.7)
NEUTROPHILS # BLD AUTO: 17.4 K/UL (ref 1.8–7.7)
NEUTROPHILS NFR BLD: 82.2 % (ref 38–73)
NEUTROPHILS NFR BLD: 83.1 % (ref 38–73)
NRBC BLD-RTO: 0 /100 WBC
NRBC BLD-RTO: 0 /100 WBC
NUM UNITS TRANS PACKED RBC: NORMAL
PHOSPHATE SERPL-MCNC: 2.1 MG/DL (ref 2.7–4.5)
PLATELET # BLD AUTO: 462 K/UL (ref 150–450)
PLATELET # BLD AUTO: 475 K/UL (ref 150–450)
PMV BLD AUTO: 10.1 FL (ref 9.2–12.9)
PMV BLD AUTO: 10.7 FL (ref 9.2–12.9)
POCT GLUCOSE: 135 MG/DL (ref 70–110)
POCT GLUCOSE: 165 MG/DL (ref 70–110)
POCT GLUCOSE: 181 MG/DL (ref 70–110)
POCT GLUCOSE: 185 MG/DL (ref 70–110)
POTASSIUM SERPL-SCNC: 3 MMOL/L (ref 3.5–5.1)
PROT SERPL-MCNC: 6.1 G/DL (ref 6–8.4)
RBC # BLD AUTO: 2.27 M/UL (ref 4.6–6.2)
RBC # BLD AUTO: 2.31 M/UL (ref 4.6–6.2)
SODIUM SERPL-SCNC: 142 MMOL/L (ref 136–145)
SPECIMEN OUTDATE: NORMAL
WBC # BLD AUTO: 20.82 K/UL (ref 3.9–12.7)
WBC # BLD AUTO: 20.97 K/UL (ref 3.9–12.7)

## 2023-12-25 PROCEDURE — 63600175 PHARM REV CODE 636 W HCPCS

## 2023-12-25 PROCEDURE — 80053 COMPREHEN METABOLIC PANEL: CPT

## 2023-12-25 PROCEDURE — 94660 CPAP INITIATION&MGMT: CPT

## 2023-12-25 PROCEDURE — 25000003 PHARM REV CODE 250: Performed by: HOSPITALIST

## 2023-12-25 PROCEDURE — 25000003 PHARM REV CODE 250

## 2023-12-25 PROCEDURE — 86920 COMPATIBILITY TEST SPIN: CPT | Performed by: HOSPITALIST

## 2023-12-25 PROCEDURE — 25000003 PHARM REV CODE 250: Performed by: PSYCHIATRY & NEUROLOGY

## 2023-12-25 PROCEDURE — 84100 ASSAY OF PHOSPHORUS: CPT

## 2023-12-25 PROCEDURE — 36415 COLL VENOUS BLD VENIPUNCTURE: CPT

## 2023-12-25 PROCEDURE — 25000242 PHARM REV CODE 250 ALT 637 W/ HCPCS: Performed by: PSYCHIATRY & NEUROLOGY

## 2023-12-25 PROCEDURE — 27201109 HC SYSTEM FECAL MANAGEMENT

## 2023-12-25 PROCEDURE — 86901 BLOOD TYPING SEROLOGIC RH(D): CPT | Performed by: HOSPITALIST

## 2023-12-25 PROCEDURE — P9016 RBC LEUKOCYTES REDUCED: HCPCS | Performed by: HOSPITALIST

## 2023-12-25 PROCEDURE — 30233N1 TRANSFUSION OF NONAUTOLOGOUS RED BLOOD CELLS INTO PERIPHERAL VEIN, PERCUTANEOUS APPROACH: ICD-10-PCS | Performed by: HOSPITALIST

## 2023-12-25 PROCEDURE — 94761 N-INVAS EAR/PLS OXIMETRY MLT: CPT

## 2023-12-25 PROCEDURE — 83735 ASSAY OF MAGNESIUM: CPT

## 2023-12-25 PROCEDURE — 25000003 PHARM REV CODE 250: Performed by: REGISTERED NURSE

## 2023-12-25 PROCEDURE — 85025 COMPLETE CBC W/AUTO DIFF WBC: CPT | Mod: 91 | Performed by: HOSPITALIST

## 2023-12-25 PROCEDURE — 99900035 HC TECH TIME PER 15 MIN (STAT)

## 2023-12-25 PROCEDURE — A4216 STERILE WATER/SALINE, 10 ML: HCPCS | Performed by: PSYCHIATRY & NEUROLOGY

## 2023-12-25 PROCEDURE — A9585 GADOBUTROL INJECTION: HCPCS | Performed by: HOSPITALIST

## 2023-12-25 PROCEDURE — 11000001 HC ACUTE MED/SURG PRIVATE ROOM

## 2023-12-25 PROCEDURE — 94799 UNLISTED PULMONARY SVC/PX: CPT | Mod: XB

## 2023-12-25 PROCEDURE — 63600175 PHARM REV CODE 636 W HCPCS: Performed by: HOSPITALIST

## 2023-12-25 PROCEDURE — 25500020 PHARM REV CODE 255: Performed by: HOSPITALIST

## 2023-12-25 PROCEDURE — 85025 COMPLETE CBC W/AUTO DIFF WBC: CPT

## 2023-12-25 RX ORDER — POTASSIUM CHLORIDE 750 MG/1
40 CAPSULE, EXTENDED RELEASE ORAL ONCE
Status: DISCONTINUED | OUTPATIENT
Start: 2023-12-25 | End: 2023-12-25

## 2023-12-25 RX ORDER — ENOXAPARIN SODIUM 100 MG/ML
40 INJECTION SUBCUTANEOUS EVERY 24 HOURS
Status: DISCONTINUED | OUTPATIENT
Start: 2023-12-25 | End: 2023-12-25

## 2023-12-25 RX ORDER — HYDROCODONE BITARTRATE AND ACETAMINOPHEN 500; 5 MG/1; MG/1
TABLET ORAL
Status: DISCONTINUED | OUTPATIENT
Start: 2023-12-25 | End: 2023-12-28

## 2023-12-25 RX ORDER — POTASSIUM CHLORIDE 20 MEQ/1
40 TABLET, EXTENDED RELEASE ORAL ONCE
Status: DISCONTINUED | OUTPATIENT
Start: 2023-12-25 | End: 2023-12-25

## 2023-12-25 RX ORDER — ENOXAPARIN SODIUM 100 MG/ML
40 INJECTION SUBCUTANEOUS EVERY 24 HOURS
Status: DISCONTINUED | OUTPATIENT
Start: 2023-12-26 | End: 2023-12-26

## 2023-12-25 RX ORDER — SODIUM,POTASSIUM PHOSPHATES 280-250MG
2 POWDER IN PACKET (EA) ORAL
Status: COMPLETED | OUTPATIENT
Start: 2023-12-25 | End: 2023-12-25

## 2023-12-25 RX ORDER — GADOBUTROL 604.72 MG/ML
10 INJECTION INTRAVENOUS
Status: COMPLETED | OUTPATIENT
Start: 2023-12-25 | End: 2023-12-25

## 2023-12-25 RX ADMIN — MEROPENEM 2 G: 1 INJECTION INTRAVENOUS at 05:12

## 2023-12-25 RX ADMIN — SODIUM CHLORIDE: 9 INJECTION, SOLUTION INTRAVENOUS at 11:12

## 2023-12-25 RX ADMIN — Medication 10 ML: at 11:12

## 2023-12-25 RX ADMIN — VANCOMYCIN HYDROCHLORIDE 125 MG: KIT at 05:12

## 2023-12-25 RX ADMIN — POTASSIUM & SODIUM PHOSPHATES POWDER PACK 280-160-250 MG 2 PACKET: 280-160-250 PACK at 11:12

## 2023-12-25 RX ADMIN — POTASSIUM & SODIUM PHOSPHATES POWDER PACK 280-160-250 MG 2 PACKET: 280-160-250 PACK at 09:12

## 2023-12-25 RX ADMIN — GADOBUTROL 10 ML: 604.72 INJECTION INTRAVENOUS at 03:12

## 2023-12-25 RX ADMIN — Medication 10 ML: at 05:12

## 2023-12-25 RX ADMIN — POTASSIUM BICARBONATE 50 MEQ: 978 TABLET, EFFERVESCENT ORAL at 09:12

## 2023-12-25 RX ADMIN — VANCOMYCIN HYDROCHLORIDE 125 MG: KIT at 01:12

## 2023-12-25 RX ADMIN — INSULIN ASPART 2 UNITS: 100 INJECTION, SOLUTION INTRAVENOUS; SUBCUTANEOUS at 11:12

## 2023-12-25 RX ADMIN — PSYLLIUM HUSK 1 PACKET: 3.4 POWDER ORAL at 09:12

## 2023-12-25 RX ADMIN — VANCOMYCIN HYDROCHLORIDE 125 MG: KIT at 11:12

## 2023-12-25 RX ADMIN — COLCHICINE 0.6 MG: 0.6 TABLET, FILM COATED ORAL at 09:12

## 2023-12-25 RX ADMIN — MEROPENEM 2 G: 1 INJECTION INTRAVENOUS at 12:12

## 2023-12-25 RX ADMIN — Medication 10 ML: at 01:12

## 2023-12-25 RX ADMIN — VANCOMYCIN HYDROCHLORIDE 1000 MG: 1 INJECTION, POWDER, LYOPHILIZED, FOR SOLUTION INTRAVENOUS at 04:12

## 2023-12-25 RX ADMIN — MODAFINIL 200 MG: 100 TABLET ORAL at 05:12

## 2023-12-25 RX ADMIN — POTASSIUM & SODIUM PHOSPHATES POWDER PACK 280-160-250 MG 2 PACKET: 280-160-250 PACK at 04:12

## 2023-12-25 NOTE — ASSESSMENT & PLAN NOTE
Acute bump noted  700s --> 300s --> 200s --> 500s; concern for hypoperfusion of the liver per hepatology  Stable bilirubin; no N/V  No obvious DILI per pharm review  unremarkable Ammonia, hep viral panel, INR, CPK, GGT, US abd, CT abd findings  IVFs  Serological workup in process per hepatology consult,   Was stable haptoglobin  and bilirubin hematology input feels no hemolysis is at play  Fluctuating ALT/AST in hepatocellular pattern

## 2023-12-25 NOTE — ASSESSMENT & PLAN NOTE
How Severe Is Your Rosacea?: mild Weakness in both UE and Les likely 2/2 spine/psoas infx  Fall precautions  May consider repeat MRI spine   Is This A New Presentation, Or A Follow-Up?: Follow Up Rosacea Additional History: Patient doing well but needs a refills of medication

## 2023-12-25 NOTE — PROGRESS NOTES
Connor Mina - Neurosurgery ([facility])  [facility] Medicine  Progress Note    Patient Name: Bj Pate Jr.  MRN: [id number]  Patient Class: IP- Inpatient   Admission Date: 12/10/2023  Length of Stay: 14 days  Attending Physician: Christ Rudd MD  Primary Care Provider: Jose Antonio Foster MD        Subjective:     Principal Problem:Acute osteomyelitis of lumbar spine        HPI:  This is a 73M with a h/o CVA  about 8 to 10 years ago with R sided deficits, DM, HTN who was BIB from home with family with 3 weeks of abdominal pain. Seen at multiple EDS, and generally unremarkable work up as per family. Within last few days started to have progressive worsening back pain. Became acute altered 2 days ago w/, LE weakness now requiring assistance to walk, as well as urinary incontinence. Patient is A/O 0x at admit. Accompanied by Daughter, most of history from her.     Admission to ED patient was febrile at 102 F, tachycardic 130, tachypneic at 23, hypertensive at 140/72 initially satting well on room air later requiring 2 L nasal cannula.  WBCs elevated 12.74 anemia 11.0 platelets normal, sed rate 120+, BUN 27 magnesium 1.5, albumin 2.4, .1, troponin 0.126 urine trace proteinuria trace ketones lactate initially elevated 2.6 later 1.3 CT head unremarkable for acute processes CT abdomen and pelvis and MRI lumbar spine shows left psoas abscess L2-L3 osteomyleitis, and diskitis.  Neurosurgery was consulted recommendations are pending.  Received Cefepime and Vanc    Overview/Hospital Course:  Mr. Pate was admitted for abdominal pain and worsening mental status, and was found to have a collection in his left psoas muscle, diskitis, and osteomyelitis in the L2-L3 region.  He was placed on vancomycin and meropenem for continuing decline in mental status.  Interventional Radiology took a core biopsy of infected disc, but were unable to aspirate anything from the left psoas collection. Due to his poor mental status, an VITOR  tube and tube feedings were ordered to assist with his nutrition status. On 12/15 he returned to his mental baseline and was alert and having conversations prior to an L1-L4 fusion and decompression with neurosurgery. Was admitted to [facility] for post operative monitoring following a L1-L4 segmental posterolateral fusion, L2-L4 laminectomy and L2-L3 TLIF for L2-L4 discitis/osteomyelitis on 12/15: Went for CTA because of lethargy since surgery. Did not tolerate procedure, returned to [facility]. Sedated and intubated for MRI pan spine, as patient had post surgical changes in exam. Continuing Vanc and Deo. MRI with stable findings except for typical post-operative findings. No further surgical plans per NSGY. Provigil given to improve wakefulness, will start daily. Extubated 11/17/2023. 12/19 PICC consulted placed for long-term IV antibiotic administration. Lasix 40mg IV given for BUE swelling. US extremities ordered to rule out DVT. Showed superficial thrombophlebitis of the right cephalic vein.  Had acute transaminitis noted on 12/21 and hepatocellular pattern.  CT abdomen and ultrasound Doppler negative for any inexplicable hepatobiliary pathology.  Spontaneous improvement.  Negative viral hepatitis panel. Hb drop down to 6s on 12/26.     Interval History:  Patient has no acute complaints at this time.  No chest pain.  Nursing reports he does not take some of his medicines through putting but otherwise is not eating.  Nursing also reports he keeps taking and removing the pillows that are prompted underneath his upper extremities to minimize dependent edema.  Tube feeds running.  LFTs are now fluctuating    Review of Systems  Objective:     Vital Signs (Most Recent):  Temp: 98.1 °F (36.7 °C) (12/25/23 0843)  Pulse: (!) 113 (12/25/23 0753)  Resp: 18 (12/25/23 0753)  BP: 121/74 (12/25/23 0753)  SpO2: 98 % (12/25/23 0753) Vital Signs (24h Range):  Temp:  [97.9 °F (36.6 °C)-98.8 °F (37.1 °C)] 98.1 °F (36.7 °C)  Pulse:  [102-114]  113  Resp:  [18-20] 18  SpO2:  [95 %-100 %] 98 %  BP: (102-121)/(65-76) 121/74     Weight: 119.7 kg (264 lb)  Body mass index is 38.99 kg/m².    Intake/Output Summary (Last 24 hours) at 12/25/2023 1107  Last data filed at 12/25/2023 0745  Gross per 24 hour   Intake 731.71 ml   Output 6300 ml   Net -5568.29 ml         Physical Exam      -awake and alert, no acute distress, showing signs of confusion, some difficulty following motor commands in comprehending  -pupils equal bilaterally   No facial droop, no slurred speech  -Clear lungs bilaterally, unlabored breathing, on room air no cyanosis   -Heart sounds indicate borderline tachycardia   -Mild unchanged global edema in bilateral hands with mild tenderness to palpation noted on right hand  -No obvious babar lower extremity edema   -Does not seem to be following motor commands with the upper extremities well -only wiggles his feet    Assessment/Plan:      * Acute osteomyelitis of lumbar spine  Psoas abscess  Was admitted to [facility] for post operative monitoring following a L1-L4 segmental posterolateral fusion, L2-L4 laminectomy and L2-L3 TLIF for L2-L4 discitis/osteomyelitis on 12/15.   S/p IR biopsy of psoas abscess and L2-L3 disc space.   Persistent - Weakness in both UE and Les likely 2/2 spine/psoas infx  Fall precautions  May consider repeat MRI spine    -Continue  Meropenem and Vancomycin per ID recs    Discitis  See severe sepsis    Acute metabolic encephalopathy  Septic encephalopathy  delirium  Pain  polypharmacy    Neurology consulted, low suspicion for seizures, possibly gout flare per their assessment but this seems unlikely given his presentation and MRI/CT findings  Placed on provigil due to somnolence    Transaminitis  Acute bump noted  700s --> 300s --> 200s --> 500s; concern for hypoperfusion of the liver per hepatology  Stable bilirubin; no N/V  No obvious DILI per pharm review  unremarkable Ammonia, hep viral panel, INR, CPK, GGT, US abd, CT abd  findings  IVFs  Serological workup in process per hepatology consult,   Was stable haptoglobin  and bilirubin hematology input feels no hemolysis is at play  Fluctuating ALT/AST in hepatocellular pattern      Acute on chronic systolic heart failure  Echo 12/10/2023    Interpretation Summary    Technically difficult portable study    Left Ventricle: The left ventricle is normal in size. There is concentric remodeling. Mild global hypokinesis present. There is mildly reduced systolic function with a visually estimated ejection fraction of 45 - 50%.    Right Ventricle: Normal right ventricular cavity size. Wall thickness is normal. Right ventricle wall motion  is normal. Systolic function is normal.    Pulmonary Artery: The estimated pulmonary artery systolic pressure is at least 24 mmHg.    The IVC was not well visualized.    Lasix and losartan paused in light of acute transaminitis;     Leukocytosis  Could be related to transaminitis vs infx  F/u abd CT abd  Continue Invanz      Pain of left hand  Maybe gout?   Continue colchicine trial    Thrombophlebitis  RUE cephalic vein thrombus      Hemiparesis  History of R sided hemiparesis from prior CVA. Mostly resolved per family.  PT/OT     Stercoral colitis  CT AP w rectal colitis vs constipation    - brown bomb on 12/14 with large bowel movement  - Aggressive Bowel regimen after surgery    Back pain  Likely secondary to L2-L3 diskitis/ostemyelitis.    MRI spine completed  NSGY consulted. Surgery today for L1-L4 fusion and decompression  Pain meds PRN    Psoas muscle abscess  See severe sepsis    Malnutrition  Maintenance IVFs  NG tube placement ordered  RDN consulted, appreciate recs  Tube feeds held for surgery today    Urinary retention  Rivera placed by ED, PVR now that he has external condom catheter. Due to diskitis/osteomyelitis, concern for possible spinal cord compression.    Bladder scan as needed  Document PVRs    Debility  Weakness in both UE and Les likely  2/2 spine/psoas infx  Fall precautions  May consider repeat MRI spine    History of CVA (cerebrovascular accident)  -R thalamic, 2016  No acute processes on head CT or brain MRI    Precipitous drop in hemoglobin   /acute on chronic anemia   -no clinical bleeding noted, FOBT ordered  -1 unit PRBC ordered    VTE Risk Mitigation (From admission, onward)           Ordered     enoxaparin injection 40 mg  Every 24 hours         12/25/23 0744     IP VTE HIGH RISK PATIENT  Once         12/11/23 0652     Place sequential compression device  Until discontinued         12/11/23 0652                    Discharge Planning   GLORIA: 12/26/2023     Code Status: Full Code   Is the patient medically ready for discharge?: No    Reason for patient still in hospital (select all that apply): Patient trending condition, Laboratory test, Treatment, Imaging, and Consult recommendations  Discharge Plan A: Long-term acute care facility (LTAC)   Discharge Delays: None known at this time              Christ Rudd MD  Department of Hospital Medicine   [facility] - Neurosurgery ([facility])

## 2023-12-25 NOTE — NURSING
Nurses Note -- 4 Eyes      12/24/2023   7:48 PM      Skin assessed during: Q Shift Change      [] No Altered Skin Integrity Present    [x]Prevention Measures Documented      [x] Yes- Altered Skin Integrity Present or Discovered   [] LDA Added if Not in Epic (Describe Wound)   [] New Altered Skin Integrity was Present on Admit and Documented in LDA   [] Wound Image Taken    Wound Care Consulted? No  Wound care has already been consulted and no new skin issues present from last assessment     Attending Nurse:  Jeannie Lam RN/Staff Member:  BAILEE Rae and Janis RN

## 2023-12-25 NOTE — NURSING
Nurses Note -- 4 Eyes      12/25/2023   4:55 PM      Skin assessed during: Q Shift Change      [x] No Altered Skin Integrity Present    []Prevention Measures Documented      [] Yes- Altered Skin Integrity Present or Discovered   [] LDA Added if Not in Epic (Describe Wound)   [] New Altered Skin Integrity was Present on Admit and Documented in LDA   [] Wound Image Taken    Wound Care Consulted? No    Attending Nurse:  Adriana Lam RN/Staff Member:  Jeannie

## 2023-12-25 NOTE — SUBJECTIVE & OBJECTIVE
Interval History: 12/25: NAEON, exam stable, wound well appearing    Medications:  Continuous Infusions:   sodium chloride 0.9% 50 mL/hr at 12/24/23 1745     Scheduled Meds:   colchicine  0.6 mg Oral Daily    enoxparin  40 mg Subcutaneous Q24H (prophylaxis, 1700)    fluticasone furoate-vilanteroL  1 puff Inhalation Daily    meropenem (MERREM) IVPB  2 g Intravenous Q8H    modafiniL  200 mg Per NG tube Before breakfast    potassium bicarbonate  50 mEq Per NG tube Once    potassium, sodium phosphates  2 packet Oral QID (AC & HS)    psyllium husk (aspartame)  1 packet Per NG tube BID    sodium chloride 0.9%  10 mL Intravenous Q6H    vancomycin (VANCOCIN) IV (PEDS and ADULTS)  1,000 mg Intravenous Q24H    vancomycin  125 mg Oral Q6H     PRN Meds:0.9%  NaCl infusion (for blood administration), dextrose 10%, dextrose 10%, glucagon (human recombinant), insulin aspart U-100, ondansetron, Flushing PICC/Midline Protocol **AND** sodium chloride 0.9% **AND** sodium chloride 0.9%, Pharmacy to dose Vancomycin consult **AND** vancomycin - pharmacy to dose     Review of Systems  Objective:     Weight: 119.7 kg (264 lb)  Body mass index is 38.99 kg/m².  Vital Signs (Most Recent):  Temp: 98.1 °F (36.7 °C) (12/25/23 0843)  Pulse: (!) 113 (12/25/23 0753)  Resp: 18 (12/25/23 0753)  BP: 121/74 (12/25/23 0753)  SpO2: 98 % (12/25/23 0753) Vital Signs (24h Range):  Temp:  [97.9 °F (36.6 °C)-98.8 °F (37.1 °C)] 98.1 °F (36.7 °C)  Pulse:  [102-114] 113  Resp:  [18-20] 18  SpO2:  [95 %-100 %] 98 %  BP: (102-121)/(65-76) 121/74     Date 12/25/23 0700 - 12/26/23 0659   Shift 0843-5398 4533-6810 7524-5659 24 Hour Total   INTAKE   P.O. 0   0   Shift Total(mL/kg) 0(0)   0(0)   OUTPUT   Shift Total(mL/kg)       Weight (kg) 119.7 119.7 119.7 119.7                Oxygen Concentration (%):  [21] 21             NG/OG Tube 12/14/23 0800 nasogastric Left nostril (Active)   Placement Check placement verified by x-ray 12/23/23 0600   Tolerance no  signs/symptoms of discomfort 12/23/23 0600   Securement secured to nostril center w/ adhesive device 12/22/23 0750   Clamp Status/Tolerance clamped 12/23/23 0600   Suction Setting/Drainage Method suction at the bedside 12/23/23 0600   Insertion Site Appearance no redness, warmth, tenderness, skin breakdown, drainage 12/23/23 0600   Drainage None 12/23/23 0600   Flush/Irrigation flushed w/;water 12/23/23 0600   Feeding Type continuous 12/22/23 0750   Feeding Action feeding held 12/22/23 0920   Current Rate (mL/hr) 50 mL/hr 12/22/23 0750   Goal Rate (mL/hr) 50 mL/hr 12/22/23 0750   Intake (mL) 200 mL 12/22/23 2100   Water Bolus (mL) 250 mL 12/22/23 0920   Tube Output(mL)(Include Discarded Residual) 0 mL 12/15/23 0006   Formula Name impact peptide 1.5 12/22/23 0750   Intake (mL) - Formula Tube Feeding 50 12/22/23 0500       Male External Urinary Catheter 12/21/23 1015 Small (Active)   Collection Container Standard drainage bag 12/22/23 0750   Skin no breakdown;no redness 12/23/23 0600   Tolerance no signs/symptoms of discomfort 12/23/23 0600   Output (mL) 360 mL 12/23/23 0926   Catheter Change Date 12/23/23 12/23/23 1100   Catheter Change Time 1100 12/23/23 1100            Fecal Incontinence  12/20/23 1332 (Active)   $ Fecal Management Supplies Fecal Management System (Supply) 12/21/23 1525   Application fecal incontinence  changed 12/21/23 1525   Drainage Method attached to drainage bag 12/23/23 1100   Securement to gravity 12/20/23 2000   Skin breakdown;reddened;cleansed, skin barrier applied 12/23/23 1100   Tolerance no signs/symptoms of discomfort 12/23/23 1100   Stool (mL) 100 mL 12/23/23 0926     Physical Exam  General: well developed, well nourished, no distress.   Head: normocephalic, atraumatic  Mental Status: Awake, Alert, Oriented  Speech: Clear with content appropriate to conversation  Cranial nerves: CN II-XII grossly intact.   Sensory: intact to light touch throughout, diffusely tender  in the lower extremities    Motor Strength:   BUE 2/5 throughout, edematous, painful to touch. BLE 2/5 proximally, 3/5 distally. No abnormal movements seen.     Clonus: absent       Significant Labs:  Recent Labs   Lab 12/24/23  0731 12/25/23  0333   GLU 95 156*    142   K 2.9* 3.0*    107   CO2 26 27   BUN 47* 48*   CREATININE 0.9 0.9   CALCIUM 8.3* 8.8   MG 1.7 1.9       Recent Labs   Lab 12/24/23  0731 12/25/23  0333 12/25/23  0801   WBC 16.34* 20.97* 20.82*   HGB 8.4* 6.7* 6.6*   HCT 25.9* 20.3* 20.9*    475* 462*       Recent Labs   Lab 12/24/23  0936   INR 1.1       Microbiology Results (last 7 days)       Procedure Component Value Units Date/Time    Culture, Anaerobe [[id number]] Collected: 12/15/23 1657    Order Status: Completed Specimen: Wound from Back Updated: 12/22/23 0739     Anaerobic Culture No anaerobes isolated    Narrative:      3.) 2,3 Disc space    Culture, Anaerobe [[id number]] Collected: 12/15/23 1603    Order Status: Completed Specimen: Wound from Back Updated: 12/22/23 0739     Anaerobic Culture No anaerobes isolated    Narrative:      Paraspinal    Culture, Anaerobe [[id number]] Collected: 12/15/23 1655    Order Status: Completed Specimen: Wound from Back Updated: 12/22/23 0739     Anaerobic Culture No anaerobes isolated    Narrative:      2.) 2,3 Disc space    Culture, Anaerobic [[id number]] Collected: 12/13/23 1703    Order Status: Completed Specimen: Abscess from Buttocks, Left Updated: 12/21/23 0743     Anaerobic Culture No anaerobes isolated    Fungus culture [[id number]] Collected: 12/15/23 1657    Order Status: Completed Specimen: Wound from Back Updated: 12/20/23 1357     Fungus (Mycology) Culture Culture in progress    Narrative:      3.) 2,3 Disc space    Fungus culture [[id number]] Collected: 12/15/23 1603    Order Status: Completed Specimen: Wound from Back Updated: 12/20/23 1357     Fungus (Mycology) Culture Culture in progress    Narrative:      Paraspinal     Fungus culture [[id number]] Collected: 12/15/23 1655    Order Status: Completed Specimen: Wound from Back Updated: 12/20/23 1357     Fungus (Mycology) Culture Culture in progress    Narrative:      2.) 2,3 Disc space    Fungus culture [[id number]] Collected: 12/13/23 1703    Order Status: Completed Specimen: Abscess from Buttocks, Left Updated: 12/20/23 1312     Fungus (Mycology) Culture Culture in progress    AFB Culture & Smear [[id number]] Collected: 12/15/23 1655    Order Status: Completed Specimen: Wound from Back Updated: 12/18/23 1436     AFB Culture & Smear Culture in progress     AFB CULTURE STAIN No acid fast bacilli seen.    Narrative:      2.) 2,3 Disc space    AFB Culture & Smear [[id number]] Collected: 12/15/23 1657    Order Status: Completed Specimen: Wound from Back Updated: 12/18/23 1436     AFB Culture & Smear Culture in progress     AFB CULTURE STAIN No acid fast bacilli seen.    Narrative:      3.) 2,3 Disc space    AFB Culture & Smear [[id number]] Collected: 12/15/23 1603    Order Status: Completed Specimen: Wound from Back Updated: 12/18/23 1436     AFB Culture & Smear Culture in progress     AFB CULTURE STAIN No acid fast bacilli seen.    Narrative:      Paraspinal          All pertinent labs from the last 24 hours have been reviewed.    Significant Diagnostics:  No results found in the last 24 hours.    Neurosurgery Physical Exam

## 2023-12-25 NOTE — SUBJECTIVE & OBJECTIVE
Interval History:  Patient has no acute complaints at this time.  No chest pain.  Nursing reports he does not take some of his medicines through putting but otherwise is not eating.  Nursing also reports he keeps taking and removing the pillows that are prompted underneath his upper extremities to minimize dependent edema.  Tube feeds running.  LFTs are now fluctuating    Review of Systems  Objective:     Vital Signs (Most Recent):  Temp: 98.1 °F (36.7 °C) (12/25/23 0843)  Pulse: (!) 113 (12/25/23 0753)  Resp: 18 (12/25/23 0753)  BP: 121/74 (12/25/23 0753)  SpO2: 98 % (12/25/23 0753) Vital Signs (24h Range):  Temp:  [97.9 °F (36.6 °C)-98.8 °F (37.1 °C)] 98.1 °F (36.7 °C)  Pulse:  [102-114] 113  Resp:  [18-20] 18  SpO2:  [95 %-100 %] 98 %  BP: (102-121)/(65-76) 121/74     Weight: 119.7 kg (264 lb)  Body mass index is 38.99 kg/m².    Intake/Output Summary (Last 24 hours) at 12/25/2023 1107  Last data filed at 12/25/2023 0745  Gross per 24 hour   Intake 731.71 ml   Output 6300 ml   Net -5568.29 ml         Physical Exam      -awake and alert, no acute distress, showing signs of confusion, some difficulty following motor commands in comprehending  -pupils equal bilaterally   No facial droop, no slurred speech  -Clear lungs bilaterally, unlabored breathing, on room air no cyanosis   -Heart sounds indicate borderline tachycardia   -Mild unchanged global edema in bilateral hands with mild tenderness to palpation noted on right hand  -No obvious babar lower extremity edema   -Does not seem to be following motor commands with the upper extremities well -only wiggles his feet

## 2023-12-25 NOTE — TREATMENT PLAN
Hepatology Treatment Plan    Bj Pate Jr. is a 73 y.o. male admitted to hospital 12/10/2023 (Hospital Day: 16) due to Acute osteomyelitis of lumbar spine.     Interval History  BP now more stable & improved from yesterday.     LFTs declined today: AST 500s>300s, ALT 300s>200s.     Objective  Temp:  [97.9 °F (36.6 °C)-99 °F (37.2 °C)] 99 °F (37.2 °C) (12/25 1141)  Pulse:  [102-114] 107 (12/25 1141)  BP: (102-121)/(65-76) 108/65 (12/25 1141)  Resp:  [16-19] 16 (12/25 1141)  SpO2:  [95 %-100 %] 96 % (12/25 1141)      Laboratory    Lab Results   Component Value Date    WBC 20.82 (H) 12/25/2023    HGB 6.6 (L) 12/25/2023    HCT 20.9 (L) 12/25/2023    MCV 91 12/25/2023     (H) 12/25/2023       Lab Results   Component Value Date     12/25/2023    K 3.0 (L) 12/25/2023     12/25/2023    CO2 27 12/25/2023    BUN 48 (H) 12/25/2023    CREATININE 0.9 12/25/2023    CALCIUM 8.8 12/25/2023       Lab Results   Component Value Date    ALBUMIN 1.0 (L) 12/25/2023     (H) 12/25/2023     (H) 12/25/2023     (H) 12/22/2023    ALKPHOS 243 (H) 12/25/2023    BILITOT 0.8 12/25/2023       Lab Results   Component Value Date    INR 1.1 12/24/2023    INR 1.1 12/23/2023    INR 1.0 12/22/2023         Assessment  Bj Pate Jr. is a 73 y.o. male with history of VA, CKD3, HTN, DM2, COPD admitted with increasing lethargy s/p fall found to have L psoas abscess/lumbar OM s/p IR aspiration of L2/3 disc space (cx ngtd) and washout with lumbar fusion/TLIF/laminectomy with NSGY on 12/15.  Required brief stay in MICU for hypotension.  On 12/21/23, had sudden spike in transaminases (peak , ). LFTs fluctuating. Hemolytic workup negative & PLT WNL in spite of schistocytes on peripheral smear.     Suspect ischemic hepatitis in the setting of relative hypotension and sepsis. LFTs improving with stable BP.     Problem List:  Elevated LFTs (hepatocellular pattern), likely secondary to  hypoperfusion  Moderate alcohol use    Plan  -  Continue to optimize infectious and other medical comorbidities   - please obtain daily CBC, CMP. Follow [facility].   - Signing off. Please reach out with further questions.     Thank you for involving us in the care of Bj Pate Jr.. Please call with any additional concerns or questions.    Jesús Lockett MD, PGY-V  Gastroenterology Fellow  [facility]

## 2023-12-25 NOTE — ASSESSMENT & PLAN NOTE
Psoas abscess  Was admitted to [facility] for post operative monitoring following a L1-L4 segmental posterolateral fusion, L2-L4 laminectomy and L2-L3 TLIF for L2-L4 discitis/osteomyelitis on 12/15.   S/p IR biopsy of psoas abscess and L2-L3 disc space.   Persistent - Weakness in both UE and Les likely 2/2 spine/psoas infx  Fall precautions  May consider repeat MRI spine    -Continue  Meropenem and Vancomycin per ID recs

## 2023-12-25 NOTE — PROGRESS NOTES
Connor Mina - Neurosurgery ([facility])  Neurosurgery  Progress Note    Subjective:     History of Present Illness: Mr. Pate is a 73M w/ hx CVA, CKD3, HTN, DM2, COPD who presents with increasing lethargy and confusion. Per ED team, patient had 3 weeks of low back pain, with a fall yesterday leading to his presentation. He had subjective lower extremity weakness and urinary retention after the fall. Septic on presentation to ED. Too altered to meaningfully participate in spine motor exam at time of NSGY evaluation.     Post-Op Info:  Procedure(s) (LRB):  L-1 TO L-4 FUSION, SPINE, LUMBAR, TLIF, POSTERIOR APPROACH, USING PEDICLE SCREW (N/A)   10 Days Post-Op   Interval History: 12/25: NAEON, exam stable, wound well appearing    Medications:  Continuous Infusions:   sodium chloride 0.9% 50 mL/hr at 12/24/23 1745     Scheduled Meds:   colchicine  0.6 mg Oral Daily    enoxparin  40 mg Subcutaneous Q24H (prophylaxis, 1700)    fluticasone furoate-vilanteroL  1 puff Inhalation Daily    meropenem (MERREM) IVPB  2 g Intravenous Q8H    modafiniL  200 mg Per NG tube Before breakfast    potassium bicarbonate  50 mEq Per NG tube Once    potassium, sodium phosphates  2 packet Oral QID (AC & HS)    psyllium husk (aspartame)  1 packet Per NG tube BID    sodium chloride 0.9%  10 mL Intravenous Q6H    vancomycin (VANCOCIN) IV (PEDS and ADULTS)  1,000 mg Intravenous Q24H    vancomycin  125 mg Oral Q6H     PRN Meds:0.9%  NaCl infusion (for blood administration), dextrose 10%, dextrose 10%, glucagon (human recombinant), insulin aspart U-100, ondansetron, Flushing PICC/Midline Protocol **AND** sodium chloride 0.9% **AND** sodium chloride 0.9%, Pharmacy to dose Vancomycin consult **AND** vancomycin - pharmacy to dose     Review of Systems  Objective:     Weight: 119.7 kg (264 lb)  Body mass index is 38.99 kg/m².  Vital Signs (Most Recent):  Temp: 98.1 °F (36.7 °C) (12/25/23 0843)  Pulse: (!) 113 (12/25/23 0753)  Resp: 18 (12/25/23 0753)  BP:  121/74 (12/25/23 0753)  SpO2: 98 % (12/25/23 0753) Vital Signs (24h Range):  Temp:  [97.9 °F (36.6 °C)-98.8 °F (37.1 °C)] 98.1 °F (36.7 °C)  Pulse:  [102-114] 113  Resp:  [18-20] 18  SpO2:  [95 %-100 %] 98 %  BP: (102-121)/(65-76) 121/74     Date 12/25/23 0700 - 12/26/23 0659   Shift 2299-4096 6891-2254 0340-6736 24 Hour Total   INTAKE   P.O. 0   0   Shift Total(mL/kg) 0(0)   0(0)   OUTPUT   Shift Total(mL/kg)       Weight (kg) 119.7 119.7 119.7 119.7                Oxygen Concentration (%):  [21] 21             NG/OG Tube 12/14/23 0800 nasogastric Left nostril (Active)   Placement Check placement verified by x-ray 12/23/23 0600   Tolerance no signs/symptoms of discomfort 12/23/23 0600   Securement secured to nostril center w/ adhesive device 12/22/23 0750   Clamp Status/Tolerance clamped 12/23/23 0600   Suction Setting/Drainage Method suction at the bedside 12/23/23 0600   Insertion Site Appearance no redness, warmth, tenderness, skin breakdown, drainage 12/23/23 0600   Drainage None 12/23/23 0600   Flush/Irrigation flushed w/;water 12/23/23 0600   Feeding Type continuous 12/22/23 0750   Feeding Action feeding held 12/22/23 0920   Current Rate (mL/hr) 50 mL/hr 12/22/23 0750   Goal Rate (mL/hr) 50 mL/hr 12/22/23 0750   Intake (mL) 200 mL 12/22/23 2100   Water Bolus (mL) 250 mL 12/22/23 0920   Tube Output(mL)(Include Discarded Residual) 0 mL 12/15/23 0006   Formula Name impact peptide 1.5 12/22/23 0750   Intake (mL) - Formula Tube Feeding 50 12/22/23 0500       Male External Urinary Catheter 12/21/23 1015 Small (Active)   Collection Container Standard drainage bag 12/22/23 0750   Skin no breakdown;no redness 12/23/23 0600   Tolerance no signs/symptoms of discomfort 12/23/23 0600   Output (mL) 360 mL 12/23/23 0926   Catheter Change Date 12/23/23 12/23/23 1100   Catheter Change Time 1100 12/23/23 1100            Fecal Incontinence  12/20/23 1332 (Active)   $ Fecal Management Supplies Fecal Management System  (Supply) 12/21/23 1525   Application fecal incontinence  changed 12/21/23 1525   Drainage Method attached to drainage bag 12/23/23 1100   Securement to gravity 12/20/23 2000   Skin breakdown;reddened;cleansed, skin barrier applied 12/23/23 1100   Tolerance no signs/symptoms of discomfort 12/23/23 1100   Stool (mL) 100 mL 12/23/23 0926     Physical Exam  General: well developed, well nourished, no distress.   Head: normocephalic, atraumatic  Mental Status: Awake, Alert, Oriented  Speech: Clear with content appropriate to conversation  Cranial nerves: CN II-XII grossly intact.   Sensory: intact to light touch throughout, diffusely tender in the lower extremities    Motor Strength:   BUE 2/5 throughout, edematous, painful to touch. BLE 2/5 proximally, 3/5 distally. No abnormal movements seen.     Clonus: absent       Significant Labs:  Recent Labs   Lab 12/24/23  0731 12/25/23  0333   GLU 95 156*    142   K 2.9* 3.0*    107   CO2 26 27   BUN 47* 48*   CREATININE 0.9 0.9   CALCIUM 8.3* 8.8   MG 1.7 1.9       Recent Labs   Lab 12/24/23  0731 12/25/23  0333 12/25/23  0801   WBC 16.34* 20.97* 20.82*   HGB 8.4* 6.7* 6.6*   HCT 25.9* 20.3* 20.9*    475* 462*       Recent Labs   Lab 12/24/23  0936   INR 1.1       Microbiology Results (last 7 days)       Procedure Component Value Units Date/Time    Culture, Anaerobe [[id number]] Collected: 12/15/23 1657    Order Status: Completed Specimen: Wound from Back Updated: 12/22/23 0739     Anaerobic Culture No anaerobes isolated    Narrative:      3.) 2,3 Disc space    Culture, Anaerobe [[id number]] Collected: 12/15/23 1603    Order Status: Completed Specimen: Wound from Back Updated: 12/22/23 0739     Anaerobic Culture No anaerobes isolated    Narrative:      Paraspinal    Culture, Anaerobe [[id number]] Collected: 12/15/23 1655    Order Status: Completed Specimen: Wound from Back Updated: 12/22/23 0739     Anaerobic Culture No anaerobes isolated     Narrative:      2.) 2,3 Disc space    Culture, Anaerobic [[id number]] Collected: 12/13/23 1703    Order Status: Completed Specimen: Abscess from Buttocks, Left Updated: 12/21/23 0743     Anaerobic Culture No anaerobes isolated    Fungus culture [[id number]] Collected: 12/15/23 1657    Order Status: Completed Specimen: Wound from Back Updated: 12/20/23 1357     Fungus (Mycology) Culture Culture in progress    Narrative:      3.) 2,3 Disc space    Fungus culture [[id number]] Collected: 12/15/23 1603    Order Status: Completed Specimen: Wound from Back Updated: 12/20/23 1357     Fungus (Mycology) Culture Culture in progress    Narrative:      Paraspinal    Fungus culture [[id number]] Collected: 12/15/23 1655    Order Status: Completed Specimen: Wound from Back Updated: 12/20/23 1357     Fungus (Mycology) Culture Culture in progress    Narrative:      2.) 2,3 Disc space    Fungus culture [[id number]] Collected: 12/13/23 1703    Order Status: Completed Specimen: Abscess from Buttocks, Left Updated: 12/20/23 1312     Fungus (Mycology) Culture Culture in progress    AFB Culture & Smear [[id number]] Collected: 12/15/23 1655    Order Status: Completed Specimen: Wound from Back Updated: 12/18/23 1436     AFB Culture & Smear Culture in progress     AFB CULTURE STAIN No acid fast bacilli seen.    Narrative:      2.) 2,3 Disc space    AFB Culture & Smear [[id number]] Collected: 12/15/23 1657    Order Status: Completed Specimen: Wound from Back Updated: 12/18/23 1436     AFB Culture & Smear Culture in progress     AFB CULTURE STAIN No acid fast bacilli seen.    Narrative:      3.) 2,3 Disc space    AFB Culture & Smear [[id number]] Collected: 12/15/23 1603    Order Status: Completed Specimen: Wound from Back Updated: 12/18/23 1436     AFB Culture & Smear Culture in progress     AFB CULTURE STAIN No acid fast bacilli seen.    Narrative:      Paraspinal          All pertinent labs from the last 24 hours have been  reviewed.    Significant Diagnostics:  No results found in the last 24 hours.    Neurosurgery Physical Exam  Assessment/Plan:     * Acute osteomyelitis of lumbar spine  A 73M w/ hx CVA, CKD3, HTN, DM2, and COPD who presents with AMS likely 2/2 underlying sepsis as well as progressive back pain and inability to ambulate for the past 2 weeks due to progressive L2-L3 osteodiscitis and presumed mechanical instability.     CTH 12/11: lacunar infarcts   MRI w/wo L sp 12/11: severe central stenosis L4-5, discitis osteomyelitis at L2-3, L psoas abscess up to 2 cm  MRI C/T w/wo: very poor with alot of motion artifact, but no OM else where   US BLE 12/12: negative  12/10: , CRP >120  BCx NGTD  MRI brain, Csp, CTH 12/16: nondiagnostic  MRI C/T/L w/wo 12/16 without contribution to picture of fluctuating sensorium and motor exam; severe spondylosis in Csp with some STIR and contrast enhancement prevertebral and possibly early osteodiscitis. MRI Tsp without concerning findings and MRI Lsp with expected postop changes.      Now s/p L1-L4 decompression, L2-L3 TLIF and L1-L4 PSF on 12/15    Subaxial cervical spondylosis which may require PCF in an outpatient setting  Encephalopathy is improving.   Continue multimodal pain medication   Turn every 2 hours to promote wound healing. Keep area clean and dry.   HV and WV removed 12/20   LSO brace when OOB  Intraoperative cultures NGTD, continue abx per ID  DVT ppx   PT/OT and OOB   Obtain post-op XR lumbar spine. Ok to obtain supine.   Rest of care per primary   Please notify neurosurgery on call for any acute neurologic change.            Casie Ríos MD  Neurosurgery  [facility] - Neurosurgery ([facility])

## 2023-12-26 PROBLEM — E87.6 HYPOKALEMIA: Status: ACTIVE | Noted: 2023-12-26

## 2023-12-26 PROBLEM — E87.0 HYPERNATREMIA: Status: ACTIVE | Noted: 2023-12-26

## 2023-12-26 LAB
ALBUMIN SERPL BCP-MCNC: 1 G/DL (ref 3.5–5.2)
ALBUMIN SERPL BCP-MCNC: 1 G/DL (ref 3.5–5.2)
ALP SERPL-CCNC: 203 U/L (ref 55–135)
ALP SERPL-CCNC: 211 U/L (ref 55–135)
ALT SERPL W/O P-5'-P-CCNC: 233 U/L (ref 10–44)
ALT SERPL W/O P-5'-P-CCNC: 238 U/L (ref 10–44)
ANA SER QL IF: NORMAL
ANION GAP SERPL CALC-SCNC: 10 MMOL/L (ref 8–16)
ANION GAP SERPL CALC-SCNC: 8 MMOL/L (ref 8–16)
AST SERPL-CCNC: 319 U/L (ref 10–40)
AST SERPL-CCNC: 354 U/L (ref 10–40)
BASOPHILS # BLD AUTO: 0.03 K/UL (ref 0–0.2)
BASOPHILS # BLD AUTO: 0.04 K/UL (ref 0–0.2)
BASOPHILS NFR BLD: 0.2 % (ref 0–1.9)
BASOPHILS NFR BLD: 0.2 % (ref 0–1.9)
BILIRUB SERPL-MCNC: 0.7 MG/DL (ref 0.1–1)
BILIRUB SERPL-MCNC: 0.7 MG/DL (ref 0.1–1)
BLD PROD TYP BPU: NORMAL
BLOOD UNIT EXPIRATION DATE: NORMAL
BLOOD UNIT TYPE CODE: 7300
BLOOD UNIT TYPE: NORMAL
BUN SERPL-MCNC: 44 MG/DL (ref 8–23)
BUN SERPL-MCNC: 44 MG/DL (ref 8–23)
CALCIUM SERPL-MCNC: 8.2 MG/DL (ref 8.7–10.5)
CALCIUM SERPL-MCNC: 9 MG/DL (ref 8.7–10.5)
CHLORIDE SERPL-SCNC: 109 MMOL/L (ref 95–110)
CHLORIDE SERPL-SCNC: 109 MMOL/L (ref 95–110)
CO2 SERPL-SCNC: 29 MMOL/L (ref 23–29)
CO2 SERPL-SCNC: 30 MMOL/L (ref 23–29)
CODING SYSTEM: NORMAL
CREAT SERPL-MCNC: 0.8 MG/DL (ref 0.5–1.4)
CREAT SERPL-MCNC: 0.9 MG/DL (ref 0.5–1.4)
CROSSMATCH INTERPRETATION: NORMAL
DIFFERENTIAL METHOD BLD: ABNORMAL
DIFFERENTIAL METHOD BLD: ABNORMAL
DISPENSE STATUS: NORMAL
EOSINOPHIL # BLD AUTO: 0.2 K/UL (ref 0–0.5)
EOSINOPHIL # BLD AUTO: 0.3 K/UL (ref 0–0.5)
EOSINOPHIL NFR BLD: 1.2 % (ref 0–8)
EOSINOPHIL NFR BLD: 1.6 % (ref 0–8)
ERYTHROCYTE [DISTWIDTH] IN BLOOD BY AUTOMATED COUNT: 15.5 % (ref 11.5–14.5)
ERYTHROCYTE [DISTWIDTH] IN BLOOD BY AUTOMATED COUNT: 15.7 % (ref 11.5–14.5)
EST. GFR  (NO RACE VARIABLE): >60 ML/MIN/1.73 M^2
EST. GFR  (NO RACE VARIABLE): >60 ML/MIN/1.73 M^2
GLUCOSE SERPL-MCNC: 106 MG/DL (ref 70–110)
GLUCOSE SERPL-MCNC: 99 MG/DL (ref 70–110)
HCT VFR BLD AUTO: 20.8 % (ref 40–54)
HCT VFR BLD AUTO: 22 % (ref 40–54)
HGB BLD-MCNC: 6.7 G/DL (ref 14–18)
HGB BLD-MCNC: 7 G/DL (ref 14–18)
IMM GRANULOCYTES # BLD AUTO: 0.72 K/UL (ref 0–0.04)
IMM GRANULOCYTES # BLD AUTO: 0.73 K/UL (ref 0–0.04)
IMM GRANULOCYTES NFR BLD AUTO: 4 % (ref 0–0.5)
IMM GRANULOCYTES NFR BLD AUTO: 4.2 % (ref 0–0.5)
LYMPHOCYTES # BLD AUTO: 1.3 K/UL (ref 1–4.8)
LYMPHOCYTES # BLD AUTO: 1.3 K/UL (ref 1–4.8)
LYMPHOCYTES NFR BLD: 7.2 % (ref 18–48)
LYMPHOCYTES NFR BLD: 7.4 % (ref 18–48)
MAGNESIUM SERPL-MCNC: 1.7 MG/DL (ref 1.6–2.6)
MCH RBC QN AUTO: 28.9 PG (ref 27–31)
MCH RBC QN AUTO: 29.5 PG (ref 27–31)
MCHC RBC AUTO-ENTMCNC: 31.8 G/DL (ref 32–36)
MCHC RBC AUTO-ENTMCNC: 32.2 G/DL (ref 32–36)
MCV RBC AUTO: 91 FL (ref 82–98)
MCV RBC AUTO: 92 FL (ref 82–98)
MITOCHONDRIA AB TITR SER IF: NORMAL {TITER}
MONOCYTES # BLD AUTO: 1.6 K/UL (ref 0.3–1)
MONOCYTES # BLD AUTO: 1.8 K/UL (ref 0.3–1)
MONOCYTES NFR BLD: 10.1 % (ref 4–15)
MONOCYTES NFR BLD: 9.3 % (ref 4–15)
NEUTROPHILS # BLD AUTO: 13.2 K/UL (ref 1.8–7.7)
NEUTROPHILS # BLD AUTO: 14.2 K/UL (ref 1.8–7.7)
NEUTROPHILS NFR BLD: 77.3 % (ref 38–73)
NEUTROPHILS NFR BLD: 77.3 % (ref 38–73)
NRBC BLD-RTO: 0 /100 WBC
NRBC BLD-RTO: 0 /100 WBC
NUM UNITS TRANS PACKED RBC: NORMAL
OB PNL STL: NEGATIVE
PHOSPHATE SERPL-MCNC: 1 MG/DL (ref 2.7–4.5)
PLATELET # BLD AUTO: 410 K/UL (ref 150–450)
PLATELET # BLD AUTO: 423 K/UL (ref 150–450)
PMV BLD AUTO: 10.2 FL (ref 9.2–12.9)
PMV BLD AUTO: 11.2 FL (ref 9.2–12.9)
POCT GLUCOSE: 117 MG/DL (ref 70–110)
POCT GLUCOSE: 121 MG/DL (ref 70–110)
POCT GLUCOSE: 131 MG/DL (ref 70–110)
POCT GLUCOSE: 162 MG/DL (ref 70–110)
POTASSIUM SERPL-SCNC: 3.2 MMOL/L (ref 3.5–5.1)
POTASSIUM SERPL-SCNC: 3.3 MMOL/L (ref 3.5–5.1)
PROT SERPL-MCNC: 5.7 G/DL (ref 6–8.4)
PROT SERPL-MCNC: 6 G/DL (ref 6–8.4)
RBC # BLD AUTO: 2.27 M/UL (ref 4.6–6.2)
RBC # BLD AUTO: 2.42 M/UL (ref 4.6–6.2)
SODIUM SERPL-SCNC: 147 MMOL/L (ref 136–145)
SODIUM SERPL-SCNC: 148 MMOL/L (ref 136–145)
VANCOMYCIN TROUGH SERPL-MCNC: 12 UG/ML (ref 10–22)
WBC # BLD AUTO: 17.06 K/UL (ref 3.9–12.7)
WBC # BLD AUTO: 18.29 K/UL (ref 3.9–12.7)

## 2023-12-26 PROCEDURE — 86920 COMPATIBILITY TEST SPIN: CPT | Performed by: HOSPITALIST

## 2023-12-26 PROCEDURE — 94640 AIRWAY INHALATION TREATMENT: CPT

## 2023-12-26 PROCEDURE — 82272 OCCULT BLD FECES 1-3 TESTS: CPT | Performed by: HOSPITALIST

## 2023-12-26 PROCEDURE — P9016 RBC LEUKOCYTES REDUCED: HCPCS | Performed by: HOSPITALIST

## 2023-12-26 PROCEDURE — 83735 ASSAY OF MAGNESIUM: CPT

## 2023-12-26 PROCEDURE — 94761 N-INVAS EAR/PLS OXIMETRY MLT: CPT

## 2023-12-26 PROCEDURE — 84100 ASSAY OF PHOSPHORUS: CPT

## 2023-12-26 PROCEDURE — 25000003 PHARM REV CODE 250: Performed by: PSYCHIATRY & NEUROLOGY

## 2023-12-26 PROCEDURE — 80053 COMPREHEN METABOLIC PANEL: CPT

## 2023-12-26 PROCEDURE — 80202 ASSAY OF VANCOMYCIN: CPT | Performed by: HOSPITALIST

## 2023-12-26 PROCEDURE — 25000003 PHARM REV CODE 250

## 2023-12-26 PROCEDURE — 94660 CPAP INITIATION&MGMT: CPT

## 2023-12-26 PROCEDURE — 99900035 HC TECH TIME PER 15 MIN (STAT)

## 2023-12-26 PROCEDURE — 25000242 PHARM REV CODE 250 ALT 637 W/ HCPCS: Performed by: PSYCHIATRY & NEUROLOGY

## 2023-12-26 PROCEDURE — 25000003 PHARM REV CODE 250: Performed by: REGISTERED NURSE

## 2023-12-26 PROCEDURE — 85025 COMPLETE CBC W/AUTO DIFF WBC: CPT

## 2023-12-26 PROCEDURE — 80053 COMPREHEN METABOLIC PANEL: CPT | Mod: 91 | Performed by: HOSPITALIST

## 2023-12-26 PROCEDURE — 85025 COMPLETE CBC W/AUTO DIFF WBC: CPT | Mod: 91 | Performed by: HOSPITALIST

## 2023-12-26 PROCEDURE — 63600175 PHARM REV CODE 636 W HCPCS

## 2023-12-26 PROCEDURE — 11000001 HC ACUTE MED/SURG PRIVATE ROOM

## 2023-12-26 PROCEDURE — A4216 STERILE WATER/SALINE, 10 ML: HCPCS | Performed by: PSYCHIATRY & NEUROLOGY

## 2023-12-26 PROCEDURE — 25000242 PHARM REV CODE 250 ALT 637 W/ HCPCS: Performed by: STUDENT IN AN ORGANIZED HEALTH CARE EDUCATION/TRAINING PROGRAM

## 2023-12-26 PROCEDURE — 25000003 PHARM REV CODE 250: Performed by: HOSPITALIST

## 2023-12-26 PROCEDURE — 63600175 PHARM REV CODE 636 W HCPCS: Performed by: HOSPITALIST

## 2023-12-26 RX ORDER — ENOXAPARIN SODIUM 100 MG/ML
40 INJECTION SUBCUTANEOUS EVERY 24 HOURS
Status: DISCONTINUED | OUTPATIENT
Start: 2023-12-27 | End: 2023-12-29 | Stop reason: HOSPADM

## 2023-12-26 RX ORDER — POTASSIUM CHLORIDE 20 MEQ/1
40 TABLET, EXTENDED RELEASE ORAL ONCE
Status: DISCONTINUED | OUTPATIENT
Start: 2023-12-26 | End: 2023-12-26

## 2023-12-26 RX ORDER — FUROSEMIDE 10 MG/ML
80 INJECTION INTRAMUSCULAR; INTRAVENOUS
Status: DISCONTINUED | OUTPATIENT
Start: 2023-12-26 | End: 2023-12-29

## 2023-12-26 RX ORDER — LOSARTAN POTASSIUM 25 MG/1
25 TABLET ORAL DAILY
Status: DISCONTINUED | OUTPATIENT
Start: 2023-12-26 | End: 2023-12-29 | Stop reason: HOSPADM

## 2023-12-26 RX ORDER — FUROSEMIDE 10 MG/ML
40 INJECTION INTRAMUSCULAR; INTRAVENOUS
Status: DISCONTINUED | OUTPATIENT
Start: 2023-12-26 | End: 2023-12-26

## 2023-12-26 RX ORDER — HYDROCODONE BITARTRATE AND ACETAMINOPHEN 500; 5 MG/1; MG/1
TABLET ORAL
Status: DISCONTINUED | OUTPATIENT
Start: 2023-12-26 | End: 2023-12-29 | Stop reason: HOSPADM

## 2023-12-26 RX ADMIN — FUROSEMIDE 80 MG: 10 INJECTION, SOLUTION INTRAMUSCULAR; INTRAVENOUS at 11:12

## 2023-12-26 RX ADMIN — VANCOMYCIN HYDROCHLORIDE 125 MG: KIT at 01:12

## 2023-12-26 RX ADMIN — VANCOMYCIN HYDROCHLORIDE 125 MG: KIT at 06:12

## 2023-12-26 RX ADMIN — MEROPENEM 2 G: 1 INJECTION INTRAVENOUS at 10:12

## 2023-12-26 RX ADMIN — FLUTICASONE FUROATE AND VILANTEROL TRIFENATATE 1 PUFF: 200; 25 POWDER RESPIRATORY (INHALATION) at 09:12

## 2023-12-26 RX ADMIN — POTASSIUM PHOSPHATE, MONOBASIC AND POTASSIUM PHOSPHATE, DIBASIC 30 MMOL: 224; 236 INJECTION, SOLUTION, CONCENTRATE INTRAVENOUS at 10:12

## 2023-12-26 RX ADMIN — POTASSIUM BICARBONATE 25 MEQ: 978 TABLET, EFFERVESCENT ORAL at 12:12

## 2023-12-26 RX ADMIN — VANCOMYCIN HYDROCHLORIDE 125 MG: KIT at 11:12

## 2023-12-26 RX ADMIN — Medication 10 ML: at 11:12

## 2023-12-26 RX ADMIN — COLCHICINE 0.6 MG: 0.6 TABLET, FILM COATED ORAL at 08:12

## 2023-12-26 RX ADMIN — LOSARTAN POTASSIUM 25 MG: 25 TABLET, FILM COATED ORAL at 12:12

## 2023-12-26 RX ADMIN — MEROPENEM 2 G: 1 INJECTION INTRAVENOUS at 03:12

## 2023-12-26 RX ADMIN — VANCOMYCIN HYDROCHLORIDE 125 MG: KIT at 12:12

## 2023-12-26 RX ADMIN — FUROSEMIDE 80 MG: 10 INJECTION, SOLUTION INTRAMUSCULAR; INTRAVENOUS at 12:12

## 2023-12-26 RX ADMIN — VANCOMYCIN HYDROCHLORIDE 1500 MG: 1.5 INJECTION, POWDER, LYOPHILIZED, FOR SOLUTION INTRAVENOUS at 04:12

## 2023-12-26 RX ADMIN — MODAFINIL 200 MG: 100 TABLET ORAL at 06:12

## 2023-12-26 RX ADMIN — Medication 10 ML: at 06:12

## 2023-12-26 RX ADMIN — PSYLLIUM HUSK 1 PACKET: 3.4 POWDER ORAL at 09:12

## 2023-12-26 RX ADMIN — SODIUM CHLORIDE: 9 INJECTION, SOLUTION INTRAVENOUS at 03:12

## 2023-12-26 RX ADMIN — MEROPENEM 2 G: 1 INJECTION INTRAVENOUS at 06:12

## 2023-12-26 RX ADMIN — Medication 10 ML: at 01:12

## 2023-12-26 RX ADMIN — Medication 10 ML: at 12:12

## 2023-12-26 NOTE — ASSESSMENT & PLAN NOTE
Weakness in both UE and Les likely 2/2 spine/psoas infx  Fall precautions  Anesthesia based repeat MRI spine pending

## 2023-12-26 NOTE — SUBJECTIVE & OBJECTIVE
"Interval History: NAEON. Repeat MRI obtained yesterday for exam decline, nondiagnostic given motion artifact. Exam stable and at baseline since 12/15. Patient awake, alert, oriented to self. States he is "doing ok."       Medications:  Continuous Infusions:   sodium chloride 0.9% 50 mL/hr at 12/25/23 1156     Scheduled Meds:   colchicine  0.6 mg Oral Daily    [START ON 12/27/2023] enoxparin  40 mg Subcutaneous Q24H (prophylaxis, 1700)    fluticasone furoate-vilanteroL  1 puff Inhalation Daily    furosemide (LASIX) injection  80 mg Intravenous Q12H    losartan  25 mg Oral Daily    meropenem (MERREM) IVPB  2 g Intravenous Q8H    modafiniL  200 mg Per NG tube Before breakfast    potassium phosphate IVPB  30 mmol Intravenous Once    psyllium husk (aspartame)  1 packet Per NG tube BID    sodium chloride 0.9%  10 mL Intravenous Q6H    vancomycin (VANCOCIN) IV (PEDS and ADULTS)  1,000 mg Intravenous Q24H    vancomycin  125 mg Oral Q6H     PRN Meds:0.9%  NaCl infusion (for blood administration), 0.9%  NaCl infusion (for blood administration), dextrose 10%, dextrose 10%, glucagon (human recombinant), insulin aspart U-100, ondansetron, Flushing PICC/Midline Protocol **AND** sodium chloride 0.9% **AND** sodium chloride 0.9%, Pharmacy to dose Vancomycin consult **AND** vancomycin - pharmacy to dose     Review of Systems  Objective:     Weight: 119.7 kg (264 lb)  Body mass index is 38.99 kg/m².  Vital Signs (Most Recent):  Temp: 98.1 °F (36.7 °C) (12/26/23 1138)  Pulse: 94 (12/26/23 1207)  Resp: 16 (12/26/23 1207)  BP: 132/70 (12/26/23 1207)  SpO2: 98 % (12/26/23 1207) Vital Signs (24h Range):  Temp:  [98.1 °F (36.7 °C)-99.4 °F (37.4 °C)] 98.1 °F (36.7 °C)  Pulse:  [] 94  Resp:  [16-20] 16  SpO2:  [94 %-98 %] 98 %  BP: (106-132)/(65-74) 132/70     Date 12/26/23 0700 - 12/27/23 0659   Shift 8288-4569 7259-5672 4184-7322 24 Hour Total   INTAKE   P.O. 0   0   Shift Total(mL/kg) 0(0)   0(0)   OUTPUT   Shift Total(mL/kg)     "   Weight (kg) 119.7 119.7 119.7 119.7                              NG/OG Tube 12/14/23 0800 nasogastric Left nostril (Active)   Placement Check placement verified by x-ray 12/23/23 0600   Tolerance no signs/symptoms of discomfort 12/23/23 0600   Securement secured to nostril center w/ adhesive device 12/22/23 0750   Clamp Status/Tolerance clamped 12/23/23 0600   Suction Setting/Drainage Method suction at the bedside 12/23/23 0600   Insertion Site Appearance no redness, warmth, tenderness, skin breakdown, drainage 12/23/23 0600   Drainage None 12/23/23 0600   Flush/Irrigation flushed w/;water 12/23/23 0600   Feeding Type continuous 12/22/23 0750   Feeding Action feeding held 12/22/23 0920   Current Rate (mL/hr) 50 mL/hr 12/22/23 0750   Goal Rate (mL/hr) 50 mL/hr 12/22/23 0750   Intake (mL) 200 mL 12/22/23 2100   Water Bolus (mL) 250 mL 12/22/23 0920   Tube Output(mL)(Include Discarded Residual) 0 mL 12/15/23 0006   Formula Name impact peptide 1.5 12/22/23 0750   Intake (mL) - Formula Tube Feeding 50 12/22/23 0500       Male External Urinary Catheter 12/21/23 1015 Small (Active)   Collection Container Standard drainage bag 12/22/23 0750   Skin no breakdown;no redness 12/23/23 0600   Tolerance no signs/symptoms of discomfort 12/23/23 0600   Output (mL) 360 mL 12/23/23 0926   Catheter Change Date 12/23/23 12/23/23 1100   Catheter Change Time 1100 12/23/23 1100            Fecal Incontinence  12/20/23 1332 (Active)   $ Fecal Management Supplies Fecal Management System (Supply) 12/21/23 1525   Application fecal incontinence  changed 12/21/23 1525   Drainage Method attached to drainage bag 12/23/23 1100   Securement to gravity 12/20/23 2000   Skin breakdown;reddened;cleansed, skin barrier applied 12/23/23 1100   Tolerance no signs/symptoms of discomfort 12/23/23 1100   Stool (mL) 100 mL 12/23/23 0926     Physical Exam  General: well developed, well nourished, no distress.   Head: normocephalic,  "atraumatic  Mental Status: Awake, Alert, Oriented  Speech: Clear with content appropriate to conversation  Cranial nerves: CN II-XII grossly intact.   Sensory: intact to light touch throughout, diffusely tender in extremities    Motor Strength:   BUE 2/5 throughout, edematous, painful to touch. BLE 2/5 proximally, 3/5 distally. No abnormal movements seen.     Clonus: absent       Significant Labs:  Recent Labs   Lab 12/25/23  0333 12/26/23  0350 12/26/23  0855   * 99 106    148* 147*   K 3.0* 3.3* 3.2*    109 109   CO2 27 29 30*   BUN 48* 44* 44*   CREATININE 0.9 0.8 0.9   CALCIUM 8.8 8.2* 9.0   MG 1.9 1.7  --        Recent Labs   Lab 12/25/23  0801 12/26/23  0350 12/26/23  0855   WBC 20.82* 18.29* 17.06*   HGB 6.6* 6.7* 7.0*   HCT 20.9* 20.8* 22.0*   * 423 410       No results for input(s): "LABPT", "INR", "APTT" in the last 48 hours.    Microbiology Results (last 7 days)       Procedure Component Value Units Date/Time    Culture, Anaerobe [[id number]] Collected: 12/15/23 1657    Order Status: Completed Specimen: Wound from Back Updated: 12/22/23 0739     Anaerobic Culture No anaerobes isolated    Narrative:      3.) 2,3 Disc space    Culture, Anaerobe [[id number]] Collected: 12/15/23 1603    Order Status: Completed Specimen: Wound from Back Updated: 12/22/23 0739     Anaerobic Culture No anaerobes isolated    Narrative:      Paraspinal    Culture, Anaerobe [[id number]] Collected: 12/15/23 1655    Order Status: Completed Specimen: Wound from Back Updated: 12/22/23 0739     Anaerobic Culture No anaerobes isolated    Narrative:      2.) 2,3 Disc space    Culture, Anaerobic [[id number]] Collected: 12/13/23 1703    Order Status: Completed Specimen: Abscess from Buttocks, Left Updated: 12/21/23 0743     Anaerobic Culture No anaerobes isolated    Fungus culture [[id number]] Collected: 12/15/23 1657    Order Status: Completed Specimen: Wound from Back Updated: 12/20/23 9702     Fungus (Mycology) " Culture Culture in progress    Narrative:      3.) 2,3 Disc space    Fungus culture [[id number]] Collected: 12/15/23 1603    Order Status: Completed Specimen: Wound from Back Updated: 12/20/23 1357     Fungus (Mycology) Culture Culture in progress    Narrative:      Paraspinal    Fungus culture [[id number]] Collected: 12/15/23 1655    Order Status: Completed Specimen: Wound from Back Updated: 12/20/23 1357     Fungus (Mycology) Culture Culture in progress    Narrative:      2.) 2,3 Disc space    Fungus culture [[id number]] Collected: 12/13/23 1703    Order Status: Completed Specimen: Abscess from Buttocks, Left Updated: 12/20/23 1312     Fungus (Mycology) Culture Culture in progress          All pertinent labs from the last 24 hours have been reviewed.    Significant Diagnostics:  No results found in the last 24 hours.

## 2023-12-26 NOTE — PROGRESS NOTES
Connor Mina - Neurosurgery ([facility])  [facility] Medicine  Progress Note    Patient Name: Bj Pate Jr.  MRN: [id number]  Patient Class: IP- Inpatient   Admission Date: 12/10/2023  Length of Stay: 15 days  Attending Physician: Christ Rudd MD  Primary Care Provider: Jose Antonio Foster MD        Subjective:     Principal Problem:Acute osteomyelitis of lumbar spine        HPI:  This is a 73M with a h/o CVA  about 8 to 10 years ago with R sided deficits, DM, HTN who was BIB from home with family with 3 weeks of abdominal pain. Seen at multiple EDS, and generally unremarkable work up as per family. Within last few days started to have progressive worsening back pain. Became acute altered 2 days ago w/, LE weakness now requiring assistance to walk, as well as urinary incontinence. Patient is A/O 0x at admit. Accompanied by Daughter, most of history from her.     Admission to ED patient was febrile at 102 F, tachycardic 130, tachypneic at 23, hypertensive at 140/72 initially satting well on room air later requiring 2 L nasal cannula.  WBCs elevated 12.74 anemia 11.0 platelets normal, sed rate 120+, BUN 27 magnesium 1.5, albumin 2.4, .1, troponin 0.126 urine trace proteinuria trace ketones lactate initially elevated 2.6 later 1.3 CT head unremarkable for acute processes CT abdomen and pelvis and MRI lumbar spine shows left psoas abscess L2-L3 osteomyleitis, and diskitis.  Neurosurgery was consulted recommendations are pending.  Received Cefepime and Vanc    Overview/Hospital Course:  Mr. Pate was admitted for abdominal pain and worsening mental status, and was found to have a collection in his left psoas muscle, diskitis, and osteomyelitis in the L2-L3 region.  He was placed on vancomycin and meropenem for continuing decline in mental status.  Interventional Radiology took a core biopsy of infected disc, but were unable to aspirate anything from the left psoas collection. Due to his poor mental status, an VITOR  tube and tube feedings were ordered to assist with his nutrition status. On 12/15 he returned to his mental baseline and was alert and having conversations prior to an L1-L4 fusion and decompression with neurosurgery. Was admitted to [facility] for post operative monitoring following a L1-L4 segmental posterolateral fusion, L2-L4 laminectomy and L2-L3 TLIF for L2-L4 discitis/osteomyelitis on 12/15: Went for CTA because of lethargy since surgery. Did not tolerate procedure, returned to [facility]. Sedated and intubated for MRI pan spine, as patient had post surgical changes in exam. Continuing Vanc and Deo. MRI with stable findings except for typical post-operative findings. No further surgical plans per NSGY. Provigil given to improve wakefulness, will start daily. Extubated 11/17/2023. 12/19 PICC consulted placed for long-term IV antibiotic administration. Lasix 40mg IV given for BUE swelling. US extremities ordered to rule out DVT. Showed superficial thrombophlebitis of the right cephalic vein.  Developed persistent acute transaminitis noted on 12/21 in hepatocellular pattern.  CT abdomen and ultrasound Doppler negative for any inexplicable hepatobiliary pathology w/ neg viral hep panel. Hb dropped to 6s on 12/25, got 1 prbc.     Interval History: hb down to the 6s. Redraw in process out of concern for diluted result; awaiting FOBT as well. Pt c/o chest pain intermittent; recent EKG during episode was neg for acute findings. Also endorses abd pain.     Review of Systems  Objective:     Vital Signs (Most Recent):  Temp: 98.9 °F (37.2 °C) (12/26/23 0712)  Pulse: 94 (12/26/23 1107)  Resp: 18 (12/26/23 0900)  BP: 120/71 (12/26/23 0900)  SpO2: 97 % (12/26/23 0900) Vital Signs (24h Range):  Temp:  [98.2 °F (36.8 °C)-99.4 °F (37.4 °C)] 98.9 °F (37.2 °C)  Pulse:  [] 94  Resp:  [16-20] 18  SpO2:  [94 %-98 %] 97 %  BP: (106-120)/(65-74) 120/71     Weight: 119.7 kg (264 lb)  Body mass index is 38.99 kg/m².    Intake/Output Summary  (Last 24 hours) at 12/26/2023 1120  Last data filed at 12/26/2023 0745  Gross per 24 hour   Intake 161.25 ml   Output --   Net 161.25 ml         Physical Exam      Clear lungs BL, unlabored breathing, on RA, no cyanosis  AA, NAD  Hrt sounds RRR  No LE edema  Mild-mod unchanged UE edema  Abd soft, Mild TTP over RUQ    Assessment/Plan:      * Acute osteomyelitis of lumbar spine  Psoas abscess  Was admitted to [facility] for post operative monitoring following a L1-L4 segmental posterolateral fusion, L2-L4 laminectomy and L2-L3 TLIF for L2-L4 discitis/osteomyelitis on 12/15.   S/p IR biopsy of psoas abscess and L2-L3 disc space.   Persistent - Weakness in both UE and Les likely 2/2 spine/psoas infx  Fall precautions  May consider repeat MRI spine    -Continue  Meropenem and Vancomycin per ID recs    Discitis  See severe sepsis    Acute metabolic encephalopathy  Septic encephalopathy  delirium  Pain  polypharmacy    Neurology consulted, low suspicion for seizures, possibly gout flare per their assessment but this seems unlikely given his presentation and MRI/CT findings  Placed on provigil due to somnolence    Transaminitis  -Acute bump noted  -700s --> 300s --> 200s --> 500s; concern for -hypoperfusion of the liver per hepatology; maybe hepatic congestion  -Stable bilirubin; no N/V  -No obvious DILI per pharm review  -unremarkable Ammonia, hep viral panel, INR, CPK, GGT, US abd, CT abd findings  -Serological workup in process per hepatology consult,   -Was stable haptoglobin  and bilirubin hematology input feels no hemolysis is at play  -Fluctuating ALT/AST in hepatocellular pattern      Hematocrit precipitous drop  1 unit prbc on 12/25/23  FOBT pending  Persistent hb low, recheck pending   trend      Acute on chronic systolic heart failure  Echo 12/10/2023    Interpretation Summary    Technically difficult portable study    Left Ventricle: The left ventricle is normal in size. There is concentric remodeling. Mild global  hypokinesis present. There is mildly reduced systolic function with a visually estimated ejection fraction of 45 - 50%.    Right Ventricle: Normal right ventricular cavity size. Wall thickness is normal. Right ventricle wall motion  is normal. Systolic function is normal.    Pulmonary Artery: The estimated pulmonary artery systolic pressure is at least 24 mmHg.    The IVC was not well visualized.    Stable renal function w/ fluctuating LFTs; start IV lasix and can resume losartan as BP tolerates    Leukocytosis  Could be related to transaminitis vs infx  F/u abd CT abd  Continue Invanz      Pain of left hand  Maybe gout?   Continue colchicine trial    Thrombophlebitis  RUE cephalic vein thrombus      Hemiparesis  History of R sided hemiparesis from prior CVA. Mostly resolved per family.  PT/OT     Stercoral colitis  CT AP w rectal colitis vs constipation    - brown bomb on 12/14 with large bowel movement  - Aggressive Bowel regimen after surgery    Back pain  Likely secondary to L2-L3 diskitis/ostemyelitis.    MRI spine completed  NSGY consulted. Surgery today for L1-L4 fusion and decompression  Pain meds PRN    Psoas muscle abscess  See severe sepsis    Malnutrition  Maintenance IVFs  NG tube placement ordered  RDN consulted, appreciate recs  Tube feeds held for surgery today    Urinary retention  Rivera placed by ED, PVR now that he has external condom catheter. Due to diskitis/osteomyelitis, concern for possible spinal cord compression.    Bladder scan as needed  Document PVRs    Debility  Weakness in both UE and Les likely 2/2 spine/psoas infx  Fall precautions  Anesthesia based repeat MRI spine pending    History of CVA (cerebrovascular accident)  -R thalamic, 2016  No acute processes on head CT or brain MRI      VTE Risk Mitigation (From admission, onward)           Ordered     enoxaparin injection 40 mg  Every 24 hours         12/26/23 0807     IP VTE HIGH RISK PATIENT  Once         12/11/23 0652     Place  sequential compression device  Until discontinued         12/11/23 0652                    Discharge Planning   GLORIA: 12/29/2023     Code Status: Full Code   Is the patient medically ready for discharge?: No    Reason for patient still in hospital (select all that apply): Patient unstable, Patient trending condition, Laboratory test, Treatment, and Consult recommendations  Discharge Plan A: Long-term acute care facility (LTAC)   Discharge Delays: None known at this time              Christ Rudd MD  Department of Hospital Medicine   [facility] - Neurosurgery ([facility])

## 2023-12-26 NOTE — PLAN OF CARE
Problem: Infection  Goal: Absence of Infection Signs and Symptoms  12/26/2023 0504 by Jeannie Santana RN  Outcome: Ongoing, Progressing  12/26/2023 0503 by Jeannie Santana RN  Outcome: Ongoing, Progressing     Problem: Adult Inpatient Plan of Care  Goal: Plan of Care Review  12/26/2023 0504 by Jeannie Santana RN  Outcome: Ongoing, Progressing  12/26/2023 0503 by Jeannie Santana RN  Outcome: Ongoing, Progressing  Goal: Patient-Specific Goal (Individualized)  Description: Pt. Will maintain sbp below 160  12/26/2023 0504 by Jeannie Santana RN  Outcome: Ongoing, Progressing  12/26/2023 0503 by Jeannie Santana RN  Outcome: Ongoing, Progressing  Goal: Absence of Hospital-Acquired Illness or Injury  12/26/2023 0504 by Jeannie Santana RN  Outcome: Ongoing, Progressing  12/26/2023 0503 by Jeannie Santana RN  Outcome: Ongoing, Progressing  Goal: Optimal Comfort and Wellbeing  12/26/2023 0504 by Jeannie Santana RN  Outcome: Ongoing, Progressing  12/26/2023 0503 by Jeannie Santana RN  Outcome: Ongoing, Progressing  Goal: Readiness for Transition of Care  12/26/2023 0504 by Jeannie Santana RN  Outcome: Ongoing, Progressing  12/26/2023 0503 by Jeannie Santana RN  Outcome: Ongoing, Progressing     Problem: Bariatric Environmental Safety  Goal: Safety Maintained with Care  12/26/2023 0504 by Jeannie Santana RN  Outcome: Ongoing, Progressing  12/26/2023 0503 by Jeannie Santana RN  Outcome: Ongoing, Progressing     Problem: Adjustment to Illness (Sepsis/Septic Shock)  Goal: Optimal Coping  12/26/2023 0504 by Jeannie Santana RN  Outcome: Ongoing, Progressing  12/26/2023 0503 by Jeannie Santana RN  Outcome: Ongoing, Progressing     Problem: Bleeding (Sepsis/Septic Shock)  Goal: Absence of Bleeding  12/26/2023 0504 by Jeannie Santana RN  Outcome: Ongoing, Progressing  12/26/2023 0503 by Jeannie Santana RN  Outcome: Ongoing, Progressing     Problem: Glycemic Control Impaired (Sepsis/Septic Shock)  Goal: Blood Glucose  Level Within Desired Range  12/26/2023 0504 by Jeannie Santana RN  Outcome: Ongoing, Progressing  12/26/2023 0503 by Jeannie Santana RN  Outcome: Ongoing, Progressing     Problem: Infection Progression (Sepsis/Septic Shock)  Goal: Absence of Infection Signs and Symptoms  12/26/2023 0504 by Jeannie Santana RN  Outcome: Ongoing, Progressing  12/26/2023 0503 by Jeannie Santana RN  Outcome: Ongoing, Progressing     Problem: Nutrition Impaired (Sepsis/Septic Shock)  Goal: Optimal Nutrition Intake  12/26/2023 0504 by Jeannie Santana RN  Outcome: Ongoing, Progressing  12/26/2023 0503 by Jeannie Santana RN  Outcome: Ongoing, Progressing     Problem: Fluid and Electrolyte Imbalance (Acute Kidney Injury/Impairment)  Goal: Fluid and Electrolyte Balance  12/26/2023 0504 by Jeannie Santana RN  Outcome: Ongoing, Progressing  12/26/2023 0503 by Jeannie Santana RN  Outcome: Ongoing, Progressing     Problem: Oral Intake Inadequate (Acute Kidney Injury/Impairment)  Goal: Optimal Nutrition Intake  12/26/2023 0504 by Jeannie Santana RN  Outcome: Ongoing, Progressing  12/26/2023 0503 by Jeannie Santana RN  Outcome: Ongoing, Progressing     Problem: Renal Function Impairment (Acute Kidney Injury/Impairment)  Goal: Effective Renal Function  12/26/2023 0504 by Jeannie Santana RN  Outcome: Ongoing, Progressing  12/26/2023 0503 by Jeannie Santana RN  Outcome: Ongoing, Progressing     Problem: Impaired Wound Healing  Goal: Optimal Wound Healing  12/26/2023 0504 by Jeannie Santana RN  Outcome: Ongoing, Progressing  12/26/2023 0503 by Jeannie Santana RN  Outcome: Ongoing, Progressing     Problem: Skin Injury Risk Increased  Goal: Skin Health and Integrity  12/26/2023 0504 by Jeannie Santana RN  Outcome: Ongoing, Progressing  12/26/2023 0503 by Jeannie Santana RN  Outcome: Ongoing, Progressing     Problem: Fall Injury Risk  Goal: Absence of Fall and Fall-Related Injury  12/26/2023 0504 by Jeannie Santana RN  Outcome: Ongoing,  Progressing  12/26/2023 0503 by Jeannie Santana, RN  Outcome: Ongoing, Progressing     Problem: Restraint, Nonbehavioral (Nonviolent)  Goal: Absence of Harm or Injury  12/26/2023 0504 by Jeannie Santana, RN  Outcome: Ongoing, Progressing  12/26/2023 0503 by Jeannie Santana, RN  Outcome: Ongoing, Progressing

## 2023-12-26 NOTE — PROGRESS NOTES
Pharmacokinetic Assessment Follow Up: IV Vancomycin    Vancomycin serum concentration assessment(s):    The trough level was drawn correctly and can be used to guide therapy at this time. The measurement is below the desired definitive target range of 15 to 20 mcg/mL.    Vancomycin Regimen Plan:    Change regimen to Vancomycin 1500 mg IV every 24 hours with next serum trough concentration measured at 1500 prior to 3rd dose on 12/28    Drug levels (last 3 results):  Recent Labs   Lab Result Units 12/24/23  0936 12/26/23  1343   Vancomycin, Random ug/mL 17.8  --    Vancomycin-Trough ug/mL  --  12.0       Pharmacy will continue to follow and monitor vancomycin.    Please contact pharmacy at extension [phone] for questions regarding this assessment.    Thank you for the consult,   Carlos Osei       Patient brief summary:  Bj Pate Jr. is a 73 y.o. male initiated on antimicrobial therapy with IV Vancomycin for treatment of  OM of spine    Drug Allergies:   Review of patient's allergies indicates:   Allergen Reactions    Tomato (solanum lycopersicum) Hives    Naproxen Hives    Shrimp Other (See Comments)       Actual Body Weight:   119.7 kg    Renal Function:   Estimated Creatinine Clearance: 96.2 mL/min (based on SCr of 0.9 mg/dL).,     Dialysis Method (if applicable):  N/A

## 2023-12-26 NOTE — PROGRESS NOTES
"Connor Mina - Neurosurgery ([facility])  Neurosurgery  Progress Note    Subjective:     History of Present Illness: Mr. Pate is a 73M w/ hx CVA, CKD3, HTN, DM2, COPD who presents with increasing lethargy and confusion. Per ED team, patient had 3 weeks of low back pain, with a fall yesterday leading to his presentation. He had subjective lower extremity weakness and urinary retention after the fall. Septic on presentation to ED. Too altered to meaningfully participate in spine motor exam at time of NSGY evaluation.     Post-Op Info:  Procedure(s) (LRB):  L-1 TO L-4 FUSION, SPINE, LUMBAR, TLIF, POSTERIOR APPROACH, USING PEDICLE SCREW (N/A)   11 Days Post-Op   Interval History: NAEON. Repeat MRI obtained yesterday for exam decline, nondiagnostic given motion artifact. Exam stable and at baseline since 12/15. Patient awake, alert, oriented to self. States he is "doing ok."       Medications:  Continuous Infusions:   sodium chloride 0.9% 50 mL/hr at 12/25/23 1156     Scheduled Meds:   colchicine  0.6 mg Oral Daily    [START ON 12/27/2023] enoxparin  40 mg Subcutaneous Q24H (prophylaxis, 1700)    fluticasone furoate-vilanteroL  1 puff Inhalation Daily    furosemide (LASIX) injection  80 mg Intravenous Q12H    losartan  25 mg Oral Daily    meropenem (MERREM) IVPB  2 g Intravenous Q8H    modafiniL  200 mg Per NG tube Before breakfast    potassium phosphate IVPB  30 mmol Intravenous Once    psyllium husk (aspartame)  1 packet Per NG tube BID    sodium chloride 0.9%  10 mL Intravenous Q6H    vancomycin (VANCOCIN) IV (PEDS and ADULTS)  1,000 mg Intravenous Q24H    vancomycin  125 mg Oral Q6H     PRN Meds:0.9%  NaCl infusion (for blood administration), 0.9%  NaCl infusion (for blood administration), dextrose 10%, dextrose 10%, glucagon (human recombinant), insulin aspart U-100, ondansetron, Flushing PICC/Midline Protocol **AND** sodium chloride 0.9% **AND** sodium chloride 0.9%, Pharmacy to dose Vancomycin consult **AND** " vancomycin - pharmacy to dose     Review of Systems  Objective:     Weight: 119.7 kg (264 lb)  Body mass index is 38.99 kg/m².  Vital Signs (Most Recent):  Temp: 98.1 °F (36.7 °C) (12/26/23 1138)  Pulse: 94 (12/26/23 1207)  Resp: 16 (12/26/23 1207)  BP: 132/70 (12/26/23 1207)  SpO2: 98 % (12/26/23 1207) Vital Signs (24h Range):  Temp:  [98.1 °F (36.7 °C)-99.4 °F (37.4 °C)] 98.1 °F (36.7 °C)  Pulse:  [] 94  Resp:  [16-20] 16  SpO2:  [94 %-98 %] 98 %  BP: (106-132)/(65-74) 132/70     Date 12/26/23 0700 - 12/27/23 0659   Shift 1037-4933 7784-2472 2034-4416 24 Hour Total   INTAKE   P.O. 0   0   Shift Total(mL/kg) 0(0)   0(0)   OUTPUT   Shift Total(mL/kg)       Weight (kg) 119.7 119.7 119.7 119.7                              NG/OG Tube 12/14/23 0800 nasogastric Left nostril (Active)   Placement Check placement verified by x-ray 12/23/23 0600   Tolerance no signs/symptoms of discomfort 12/23/23 0600   Securement secured to nostril center w/ adhesive device 12/22/23 0750   Clamp Status/Tolerance clamped 12/23/23 0600   Suction Setting/Drainage Method suction at the bedside 12/23/23 0600   Insertion Site Appearance no redness, warmth, tenderness, skin breakdown, drainage 12/23/23 0600   Drainage None 12/23/23 0600   Flush/Irrigation flushed w/;water 12/23/23 0600   Feeding Type continuous 12/22/23 0750   Feeding Action feeding held 12/22/23 0920   Current Rate (mL/hr) 50 mL/hr 12/22/23 0750   Goal Rate (mL/hr) 50 mL/hr 12/22/23 0750   Intake (mL) 200 mL 12/22/23 2100   Water Bolus (mL) 250 mL 12/22/23 0920   Tube Output(mL)(Include Discarded Residual) 0 mL 12/15/23 0006   Formula Name impact peptide 1.5 12/22/23 0750   Intake (mL) - Formula Tube Feeding 50 12/22/23 0500       Male External Urinary Catheter 12/21/23 1015 Small (Active)   Collection Container Standard drainage bag 12/22/23 0750   Skin no breakdown;no redness 12/23/23 0600   Tolerance no signs/symptoms of discomfort 12/23/23 0600   Output (mL) 360 mL  "12/23/23 0926   Catheter Change Date 12/23/23 12/23/23 1100   Catheter Change Time 1100 12/23/23 1100            Fecal Incontinence  12/20/23 1332 (Active)   $ Fecal Management Supplies Fecal Management System (Supply) 12/21/23 1525   Application fecal incontinence  changed 12/21/23 1525   Drainage Method attached to drainage bag 12/23/23 1100   Securement to gravity 12/20/23 2000   Skin breakdown;reddened;cleansed, skin barrier applied 12/23/23 1100   Tolerance no signs/symptoms of discomfort 12/23/23 1100   Stool (mL) 100 mL 12/23/23 0926     Physical Exam  General: well developed, well nourished, no distress.   Head: normocephalic, atraumatic  Mental Status: Awake, Alert, Oriented  Speech: Clear with content appropriate to conversation  Cranial nerves: CN II-XII grossly intact.   Sensory: intact to light touch throughout, diffusely tender in extremities    Motor Strength:   BUE 2/5 throughout, edematous, painful to touch. BLE 2/5 proximally, 3/5 distally. No abnormal movements seen.     Clonus: absent       Significant Labs:  Recent Labs   Lab 12/25/23  0333 12/26/23  0350 12/26/23  0855   * 99 106    148* 147*   K 3.0* 3.3* 3.2*    109 109   CO2 27 29 30*   BUN 48* 44* 44*   CREATININE 0.9 0.8 0.9   CALCIUM 8.8 8.2* 9.0   MG 1.9 1.7  --        Recent Labs   Lab 12/25/23  0801 12/26/23  0350 12/26/23  0855   WBC 20.82* 18.29* 17.06*   HGB 6.6* 6.7* 7.0*   HCT 20.9* 20.8* 22.0*   * 423 410       No results for input(s): "LABPT", "INR", "APTT" in the last 48 hours.    Microbiology Results (last 7 days)       Procedure Component Value Units Date/Time    Culture, Anaerobe [[id number]] Collected: 12/15/23 2930    Order Status: Completed Specimen: Wound from Back Updated: 12/22/23 0739     Anaerobic Culture No anaerobes isolated    Narrative:      3.) 2,3 Disc space    Culture, Anaerobe [[id number]] Collected: 12/15/23 4784    Order Status: Completed Specimen: Wound from Back " Updated: 12/22/23 0739     Anaerobic Culture No anaerobes isolated    Narrative:      Paraspinal    Culture, Anaerobe [[id number]] Collected: 12/15/23 1655    Order Status: Completed Specimen: Wound from Back Updated: 12/22/23 0739     Anaerobic Culture No anaerobes isolated    Narrative:      2.) 2,3 Disc space    Culture, Anaerobic [[id number]] Collected: 12/13/23 1703    Order Status: Completed Specimen: Abscess from Buttocks, Left Updated: 12/21/23 0743     Anaerobic Culture No anaerobes isolated    Fungus culture [[id number]] Collected: 12/15/23 1657    Order Status: Completed Specimen: Wound from Back Updated: 12/20/23 1357     Fungus (Mycology) Culture Culture in progress    Narrative:      3.) 2,3 Disc space    Fungus culture [[id number]] Collected: 12/15/23 1603    Order Status: Completed Specimen: Wound from Back Updated: 12/20/23 1357     Fungus (Mycology) Culture Culture in progress    Narrative:      Paraspinal    Fungus culture [[id number]] Collected: 12/15/23 1655    Order Status: Completed Specimen: Wound from Back Updated: 12/20/23 1357     Fungus (Mycology) Culture Culture in progress    Narrative:      2.) 2,3 Disc space    Fungus culture [[id number]] Collected: 12/13/23 1703    Order Status: Completed Specimen: Abscess from Buttocks, Left Updated: 12/20/23 1312     Fungus (Mycology) Culture Culture in progress          All pertinent labs from the last 24 hours have been reviewed.    Significant Diagnostics:  No results found in the last 24 hours.    Assessment/Plan:     * Acute osteomyelitis of lumbar spine  A 73M w/ hx CVA, CKD3, HTN, DM2, and COPD who presents with AMS likely 2/2 underlying sepsis as well as progressive back pain and inability to ambulate for the past 2 weeks due to progressive L2-L3 osteodiscitis and presumed mechanical instability.     CTH 12/11: lacunar infarcts   MRI w/wo L sp 12/11: severe central stenosis L4-5, discitis osteomyelitis at L2-3, L psoas abscess up to 2  cm  MRI C/T w/wo: very poor with alot of motion artifact, but no OM else where   US BLE 12/12: negative  12/10: , CRP >120  BCx NGTD  MRI brain, Csp, CTH 12/16: nondiagnostic  MRI C/T/L w/wo 12/16 without contribution to picture of fluctuating sensorium and motor exam; severe spondylosis in Csp with some STIR and contrast enhancement prevertebral and possibly early osteodiscitis. MRI Tsp without concerning findings and MRI Lsp with expected postop changes.  MRI C/T/L w/wo 12/25: limited 2/2 motion artifact.     Now s/p L1-L4 decompression, L2-L3 TLIF and L1-L4 PSF on 12/15    - Encephalopathy is improving.   - Continue multimodal pain medication   - Turn every 2 hours to promote wound healing. Keep area clean and dry.   - HV and WV removed 12/20   - LSO brace when OOB  - Intraoperative cultures NGTD, continue abx per ID  - DVT ppx   - PT/OT and OOB   - Obtain post-op XR lumbar spine. Ok to obtain supine.   - Patient does not need repeat MRI spine at this time. Continue Abx per ID for treatment of osteomyelitis. Will repeat MRI cervical spine outpatient to address possible cervical spondylosis.  - Rest of care per primary   - Please notify neurosurgery on call for any acute neurologic change.  - Discussed with JULY RichC  Neurosurgery  Connor Mina - Neurosurgery ([facility])

## 2023-12-26 NOTE — NURSING
Nurses Note -- 4 Eyes      12/25/2023   8:02 PM      Skin assessed during: Q Shift Change      [] No Altered Skin Integrity Present    []Prevention Measures Documented      [x] Yes- Altered Skin Integrity Present or Discovered   [] LDA Added if Not in Epic (Describe Wound)   [] New Altered Skin Integrity was Present on Admit and Documented in LDA   [] Wound Image Taken    No new issues at shift change no change in previously documented wounds     Wound Care Consulted? Yes    Attending Nurse:  Jeannie Lam RN/Staff Member:  Shantal MOROCHO

## 2023-12-26 NOTE — CARE UPDATE
"RAPID RESPONSE NURSE CHART REVIEW        Chart Reviewed: 12/26/2023, 11:57 AM    MRN: [id number]  Bed: 170/942 A    Dx: Acute osteomyelitis of lumbar spine    Bj Pate Jr. has a past medical history of Acute on chronic systolic heart failure, Anemia, Anticoagulant long-term use, Basal ganglia hemorrhage, Benign hypertension with CKD (chronic kidney disease) stage III, Cataract, Chronic idiopathic gout of multiple sites, Chronic kidney disease, stage 3, COPD (chronic obstructive pulmonary disease), Erectile dysfunction, Gout, Hemorrhoids without complication, Hyperlipidemia, Morbid obesity, Obstructive sleep apnea on CPAP, Reactive airway disease without complication, Severe sepsis, Stroke, Thalamic infarct, acute (right), and 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    Last VS: /71   Pulse 94   Temp 98.1 °F (36.7 °C) (Oral)   Resp 18   Ht 5' 9" (1.753 m)   Wt 119.7 kg (264 lb)   SpO2 97%   BMI 38.99 kg/m²     24H Vital Sign Range:  Temp:  [98.1 °F (36.7 °C)-99.4 °F (37.4 °C)]   Pulse:  []   Resp:  [16-20]   BP: (106-120)/(65-74)   SpO2:  [94 %-98 %]     Level of Consciousness (AVPU): alert    Recent Labs     12/25/23  0333 12/25/23  0801 12/26/23  0350   WBC 20.97* 20.82* 18.29*   HGB 6.7* 6.6* 6.7*   HCT 20.3* 20.9* 20.8*   * 462* 423       Recent Labs     12/24/23  0731 12/25/23  0333 12/26/23  0350 12/26/23  0855    142 148* 147*   K 2.9* 3.0* 3.3* 3.2*    107 109 109   CO2 26 27 29 30*   BUN 47* 48* 44* 44*   CREATININE 0.9 0.9 0.8 0.9   GLU 95 156* 99 106   PHOS 2.3* 2.1* 1.0*  --    MG 1.7 1.9 1.7  --         No results for input(s): "PH", "PCO2", "PO2", "HCO3", "POCSATURATED", "BE" in the last 72 hours.     OXYGEN:  Flow (L/min): 1  Oxygen Concentration (%): 21       MEWS score: 1    Bedside RNEric  contacted for low H/H. Reports redraw pending and VSS. No additional concerns verbalized at this time. Instructed to call [phone] for further concerns or " assistance.    Shanda Dalton RN

## 2023-12-26 NOTE — PT/OT/SLP PROGRESS
Speech Language Pathology      Bj Pate Jr.  MRN: [id number]    Patient not seen today secondary to npo for possible imaging x2 attempts. Will follow-up.

## 2023-12-26 NOTE — SUBJECTIVE & OBJECTIVE
Interval History: hb down to the 6s. Redraw in process out of concern for diluted result; awaiting FOBT as well. Pt c/o chest pain intermittent; recent EKG during episode was neg for acute findings. Also endorses abd pain.     Review of Systems  Objective:     Vital Signs (Most Recent):  Temp: 98.9 °F (37.2 °C) (12/26/23 0712)  Pulse: 94 (12/26/23 1107)  Resp: 18 (12/26/23 0900)  BP: 120/71 (12/26/23 0900)  SpO2: 97 % (12/26/23 0900) Vital Signs (24h Range):  Temp:  [98.2 °F (36.8 °C)-99.4 °F (37.4 °C)] 98.9 °F (37.2 °C)  Pulse:  [] 94  Resp:  [16-20] 18  SpO2:  [94 %-98 %] 97 %  BP: (106-120)/(65-74) 120/71     Weight: 119.7 kg (264 lb)  Body mass index is 38.99 kg/m².    Intake/Output Summary (Last 24 hours) at 12/26/2023 1120  Last data filed at 12/26/2023 0745  Gross per 24 hour   Intake 161.25 ml   Output --   Net 161.25 ml         Physical Exam      Clear lungs BL, unlabored breathing, on RA, no cyanosis  AA, NAD  Hrt sounds RRR  No LE edema  Mild-mod unchanged UE edema  Abd soft, Mild TTP over RUQ

## 2023-12-26 NOTE — ASSESSMENT & PLAN NOTE
Echo 12/10/2023    Interpretation Summary    Technically difficult portable study    Left Ventricle: The left ventricle is normal in size. There is concentric remodeling. Mild global hypokinesis present. There is mildly reduced systolic function with a visually estimated ejection fraction of 45 - 50%.    Right Ventricle: Normal right ventricular cavity size. Wall thickness is normal. Right ventricle wall motion  is normal. Systolic function is normal.    Pulmonary Artery: The estimated pulmonary artery systolic pressure is at least 24 mmHg.    The IVC was not well visualized.    Stable renal function w/ fluctuating LFTs; start IV lasix and can resume losartan as BP tolerates

## 2023-12-26 NOTE — ASSESSMENT & PLAN NOTE
A 73M w/ hx CVA, CKD3, HTN, DM2, and COPD who presents with AMS likely 2/2 underlying sepsis as well as progressive back pain and inability to ambulate for the past 2 weeks due to progressive L2-L3 osteodiscitis and presumed mechanical instability.     CTH 12/11: lacunar infarcts   MRI w/wo L sp 12/11: severe central stenosis L4-5, discitis osteomyelitis at L2-3, L psoas abscess up to 2 cm  MRI C/T w/wo: very poor with alot of motion artifact, but no OM else where   US BLE 12/12: negative  12/10: , CRP >120  BCx NGTD  MRI brain, Csp, CTH 12/16: nondiagnostic  MRI C/T/L w/wo 12/16 without contribution to picture of fluctuating sensorium and motor exam; severe spondylosis in Csp with some STIR and contrast enhancement prevertebral and possibly early osteodiscitis. MRI Tsp without concerning findings and MRI Lsp with expected postop changes.  MRI C/T/L w/wo 12/25: limited 2/2 motion artifact.     Now s/p L1-L4 decompression, L2-L3 TLIF and L1-L4 PSF on 12/15    - Encephalopathy is improving.   - Continue multimodal pain medication   - Turn every 2 hours to promote wound healing. Keep area clean and dry.   - HV and WV removed 12/20   - LSO brace when OOB  - Intraoperative cultures NGTD, continue abx per ID  - DVT ppx   - PT/OT and OOB   - Obtain post-op XR lumbar spine. Ok to obtain supine.   - Patient does not need repeat MRI spine at this time. Continue Abx per ID for treatment of osteomyelitis. Will repeat MRI cervical spine outpatient to address possible cervical spondylosis.  - Rest of care per primary   - Please notify neurosurgery on call for any acute neurologic change.  - Discussed with Dr. Rincon

## 2023-12-26 NOTE — PROGRESS NOTES
[facility] - Neurosurgery ([facility])  Infectious Disease  Progress Note    Patient Name: Bj Pate Jr.  MRN: [id number]  Admission Date: 12/10/2023  Length of Stay: 15 days  Attending Physician: Christ Rudd MD  Primary Care Provider: Jose Antonio Foster MD    Isolation Status: No active isolations  Assessment/Plan:      ID  * Acute osteomyelitis of lumbar spine  72 yo male with PMH of CVA, CKD3, HTN, DM2, COPD admitted with AMS s/p fall found to have L psoas abscess/lumbar OM s/p IR aspiration of L2/3 disc space (cx ngtd) and washout with lumbar fusion/TLIF/laminectomy with NSGY on 12/15. Intra-op cultures remain NGTD.    post-op notable for lethargy with transfer to ICU requiring intubation and pressors. Op note with purulent material. Post-op MRI revealed stable lumbar spondylodiscitis and L psoas abscess, possible cervical spondylodiscitis and bibasilar consolidations. Also noted to have possible new epidural abscess [id number], Examination is significantly degraded by motion artifact and is essentially nondiagnostic.      Recommendations:  Continue empiric vancomycin. pharm to dose. Continue meropenem 2g IV 8hr. Anticipate 6-8 wks IV abx.  Agree with MRI L Spine for further evaluation if with new epidural collection. Nsgy following   Continue oral vanc to empirically treat for cdiff as pt with history of C.diff and anticipating long term IV abx therapy     Discussed with ID staff. ID will follow closely with you            Thank you for your consult. I will follow-up with patient. Please contact us if you have any additional questions.    Sury Li PA-C  Infectious Disease  [facility] - Neurosurgery ([facility])    Subjective:     Principal Problem:Acute osteomyelitis of lumbar spine    HPI: Mr. Pate is a 72 yo male with PMH of CVA, CKD3, HTN, DM2, COPD who presents with increasing lethargy and confusion.     Pt remains altered during assessment, history gathered per family at bedside and chart review.  Per chart review, pt reported ~3 weeks of low back pain, as well as a fall leading up to presentation. Following the fall, pt reported lower extremity weakness and urinary retention. Daughter at bedside states that pt has not had any surgical procedures or injections recently, but was treated with pain management in 2019 and had lumbar and cervical injections. Pt is retired, but was a  previously. Family denied him having pets, denied adventurous hobbies, denied raw food intake. Daughter reported recent ER admission for abdominal pain. States pt required medication for a parasite that improved his symptoms. Upon chart review, appears pt presented in August with abdominal pain, CT AP was negative for acute findings. Pt was discharged and followed up with PCP shortly after and stated his symptoms had improved.     On admission, pt was febrile to 102, and tachycardic, with increasing leukocytosis, 13->16k, stable anemia, elevated sed/CRP: >120, 375, procal 1.37. blood cultures negative on admission. MRI lumbar spine noting discitis/osteomyelitis at L2-3 with left psoas abscess (2.2cm), as well as early osteo L3-4, noting difficult study and epidural abscess was difficult to exclude. CT head without evidence of superimposed acute intracranial process. MRI brain, cervical spine, and thoracic spine pending.     NSGY following and recommended IR consult for potential psoas abscess drainage and culture. Planning L2-pelvis decompression and fusion on 12/15/23.  IR planning CT guided aspiration/drainage of psoas abscess on either 12/12 or 12/13.     Pt is currently on vanc and cefepime. ID was consulted for antibiotic recs.       Interval History:   MRI C-L spine poor study, repeating MRI L spine as possible new epidural abscess   No acute complaints or concerns  Continues to have upper hand swelling. nontender   Intra-op cultures NGTD    Review of Systems   Constitutional:  Positive for activity change and  fatigue.   HENT:  Positive for dental problem.    Respiratory:  Negative for cough and shortness of breath.    Musculoskeletal:  Positive for back pain.   Skin:  Positive for wound.        +bilateral hand swelling   All other systems reviewed and are negative.    Objective:     Vital Signs (Most Recent):  Temp: 98.1 °F (36.7 °C) (12/26/23 1138)  Pulse: 94 (12/26/23 1207)  Resp: 16 (12/26/23 1207)  BP: 132/70 (12/26/23 1207)  SpO2: 98 % (12/26/23 1207) Vital Signs (24h Range):  Temp:  [98.1 °F (36.7 °C)-99.4 °F (37.4 °C)] 98.1 °F (36.7 °C)  Pulse:  [] 94  Resp:  [16-20] 16  SpO2:  [94 %-98 %] 98 %  BP: (106-132)/(65-74) 132/70     Weight: 119.7 kg (264 lb)  Body mass index is 38.99 kg/m².    Estimated Creatinine Clearance: 96.2 mL/min (based on SCr of 0.9 mg/dL).     Physical Exam  Vitals reviewed.   Constitutional:       General: He is not in acute distress.     Appearance: He is not ill-appearing.   HENT:      Head: Normocephalic and atraumatic.      Comments: NG tube in place  Pulmonary:      Effort: Pulmonary effort is normal. No respiratory distress.      Breath sounds: Normal breath sounds. No stridor.   Abdominal:      General: Abdomen is flat. There is no distension.      Palpations: Abdomen is soft.      Tenderness: There is no abdominal tenderness.      Comments: With flexiseal    Genitourinary:     Comments: bryant  Musculoskeletal:      Right lower leg: No edema.      Left lower leg: No edema.      Comments: RUE> LUE edematous  Pain with movement and palpation of RUE   Skin:     General: Skin is warm and dry.      Comments: Midline incision clean, dry, intact.    Neurological:      Mental Status: He is alert.          Significant Labs: All pertinent labs within the past 24 hours have been reviewed.    Significant Imaging: I have reviewed all pertinent imaging results/findings within the past 24 hours.

## 2023-12-26 NOTE — SUBJECTIVE & OBJECTIVE
Interval History:   MRI C-L spine poor study, repeating MRI L spine as possible new epidural abscess   No acute complaints or concerns  Continues to have upper hand swelling. nontender   Intra-op cultures NGTD    Review of Systems   Constitutional:  Positive for activity change and fatigue.   HENT:  Positive for dental problem.    Respiratory:  Negative for cough and shortness of breath.    Musculoskeletal:  Positive for back pain.   Skin:  Positive for wound.        +bilateral hand swelling   All other systems reviewed and are negative.    Objective:     Vital Signs (Most Recent):  Temp: 98.1 °F (36.7 °C) (12/26/23 1138)  Pulse: 94 (12/26/23 1207)  Resp: 16 (12/26/23 1207)  BP: 132/70 (12/26/23 1207)  SpO2: 98 % (12/26/23 1207) Vital Signs (24h Range):  Temp:  [98.1 °F (36.7 °C)-99.4 °F (37.4 °C)] 98.1 °F (36.7 °C)  Pulse:  [] 94  Resp:  [16-20] 16  SpO2:  [94 %-98 %] 98 %  BP: (106-132)/(65-74) 132/70     Weight: 119.7 kg (264 lb)  Body mass index is 38.99 kg/m².    Estimated Creatinine Clearance: 96.2 mL/min (based on SCr of 0.9 mg/dL).     Physical Exam  Vitals reviewed.   Constitutional:       General: He is not in acute distress.     Appearance: He is not ill-appearing.   HENT:      Head: Normocephalic and atraumatic.      Comments: NG tube in place  Pulmonary:      Effort: Pulmonary effort is normal. No respiratory distress.      Breath sounds: Normal breath sounds. No stridor.   Abdominal:      General: Abdomen is flat. There is no distension.      Palpations: Abdomen is soft.      Tenderness: There is no abdominal tenderness.      Comments: With flexiseal    Genitourinary:     Comments: bryant  Musculoskeletal:      Right lower leg: No edema.      Left lower leg: No edema.      Comments: RUE> LUE edematous  Pain with movement and palpation of RUE   Skin:     General: Skin is warm and dry.      Comments: Midline incision clean, dry, intact.    Neurological:      Mental Status: He is alert.           Significant Labs: All pertinent labs within the past 24 hours have been reviewed.    Significant Imaging: I have reviewed all pertinent imaging results/findings within the past 24 hours.

## 2023-12-26 NOTE — CARE UPDATE
Evaluation of Early Detection of Sepsis Risk     Patient Name: Bj Pate Jr.  MRN:  [id number]  [place] [facility]  Date of Admission:  12/10/2023     Principal Problem:  Acute osteomyelitis of lumbar spine   Date of Review: 12/26/2023    Patient reviewed for elevated sepsis risk score. Sepsis Screen Negative  Will continue to monitor for signs and symptoms of new or worsening infection and screen as needed. Please notify Rapid Response Team for any hypotension or decompensation.    Sepsis Screen Results     IP Sepsis Screen (most recent)       Sepsis Screen (IP) - 12/26/23 1057       Is the patient's history or complaint suggestive of a possible infection? Yes  -OP    Are there at least two of the following signs and symptoms present? Yes  -OP    Sepsis signs/symptoms - Tachycardia Tachycardia     >90  -OP    Sepsis signs/symptoms - WBC WBC < 4,000 or WBC > 12,000  -OP    Are any of the following organ dysfunction criteria present and not considered to be due to a chronic condition? No  -OP    Initiate Sepsis Protocol No  -OP              User Key  (r) = Recorded By, (t) = Taken By, (c) = Cosigned By      Initials Name    OP Sarah Cruz NP Olga Pinar, NP  Unit Based KEV

## 2023-12-26 NOTE — ASSESSMENT & PLAN NOTE
-Acute bump noted  -700s --> 300s --> 200s --> 500s; concern for -hypoperfusion of the liver per hepatology; maybe hepatic congestion  -Stable bilirubin; no N/V  -No obvious DILI per pharm review  -unremarkable Ammonia, hep viral panel, INR, CPK, GGT, US abd, CT abd findings  -Serological workup in process per hepatology consult,   -Was stable haptoglobin  and bilirubin hematology input feels no hemolysis is at play  -Fluctuating ALT/AST in hepatocellular pattern

## 2023-12-26 NOTE — ASSESSMENT & PLAN NOTE
74 yo male with PMH of CVA, CKD3, HTN, DM2, COPD admitted with AMS s/p fall found to have L psoas abscess/lumbar OM s/p IR aspiration of L2/3 disc space (cx ngtd) and washout with lumbar fusion/TLIF/laminectomy with NSGY on 12/15. Intra-op cultures remain NGTD.    post-op notable for lethargy with transfer to ICU requiring intubation and pressors. Op note with purulent material. Post-op MRI revealed stable lumbar spondylodiscitis and L psoas abscess, possible cervical spondylodiscitis and bibasilar consolidations. Also noted to have possible new epidural abscess [id number], Examination is significantly degraded by motion artifact and is essentially nondiagnostic.      Recommendations:  Continue empiric vancomycin. pharm to dose. Continue meropenem 2g IV 8hr. Anticipate 6-8 wks IV abx.  Agree with MRI L Spine for further evaluation if with new epidural collection. Nsgy following   Continue oral vanc to empirically treat for cdiff as pt with history of C.diff and anticipating long term IV abx therapy     Discussed with ID staff. ID will follow closely with you

## 2023-12-26 NOTE — NURSING
Nurses Note -- 4 Eyes      12/26/2023   8:22 AM      Skin assessed during: Q Shift Change      [x] No Altered Skin Integrity Present    []Prevention Measures Documented      [] Yes- Altered Skin Integrity Present or Discovered   [] LDA Added if Not in Epic (Describe Wound)   [] New Altered Skin Integrity was Present on Admit and Documented in LDA   [] Wound Image Taken    Wound Care Consulted? No    Attending Nurse:  Adriana Lam RN/Staff Member:  BAILEE Dennis

## 2023-12-27 LAB
ALBUMIN SERPL BCP-MCNC: 1.1 G/DL (ref 3.5–5.2)
ALP SERPL-CCNC: 212 U/L (ref 55–135)
ALT SERPL W/O P-5'-P-CCNC: 251 U/L (ref 10–44)
AMMONIA PLAS-SCNC: 23 UMOL/L (ref 10–50)
ANION GAP SERPL CALC-SCNC: 8 MMOL/L (ref 8–16)
AST SERPL-CCNC: 349 U/L (ref 10–40)
BASOPHILS # BLD AUTO: 0.04 K/UL (ref 0–0.2)
BASOPHILS NFR BLD: 0.2 % (ref 0–1.9)
BILIRUB SERPL-MCNC: 0.6 MG/DL (ref 0.1–1)
BUN SERPL-MCNC: 42 MG/DL (ref 8–23)
CALCIUM SERPL-MCNC: 8.6 MG/DL (ref 8.7–10.5)
CHLORIDE SERPL-SCNC: 105 MMOL/L (ref 95–110)
CO2 SERPL-SCNC: 32 MMOL/L (ref 23–29)
CREAT SERPL-MCNC: 0.9 MG/DL (ref 0.5–1.4)
DIFFERENTIAL METHOD BLD: ABNORMAL
EOSINOPHIL # BLD AUTO: 0.4 K/UL (ref 0–0.5)
EOSINOPHIL NFR BLD: 2.2 % (ref 0–8)
ERYTHROCYTE [DISTWIDTH] IN BLOOD BY AUTOMATED COUNT: 16.3 % (ref 11.5–14.5)
EST. GFR  (NO RACE VARIABLE): >60 ML/MIN/1.73 M^2
GLUCOSE SERPL-MCNC: 139 MG/DL (ref 70–110)
HCT VFR BLD AUTO: 25.6 % (ref 40–54)
HGB BLD-MCNC: 8.5 G/DL (ref 14–18)
IMM GRANULOCYTES # BLD AUTO: 0.51 K/UL (ref 0–0.04)
IMM GRANULOCYTES NFR BLD AUTO: 3.1 % (ref 0–0.5)
INR PPP: 1.1 (ref 0.8–1.2)
LYMPHOCYTES # BLD AUTO: 1.4 K/UL (ref 1–4.8)
LYMPHOCYTES NFR BLD: 8.1 % (ref 18–48)
MAGNESIUM SERPL-MCNC: 1.7 MG/DL (ref 1.6–2.6)
MCH RBC QN AUTO: 29.8 PG (ref 27–31)
MCHC RBC AUTO-ENTMCNC: 33.2 G/DL (ref 32–36)
MCV RBC AUTO: 90 FL (ref 82–98)
MONOCYTES # BLD AUTO: 1.3 K/UL (ref 0.3–1)
MONOCYTES NFR BLD: 7.7 % (ref 4–15)
NEUTROPHILS # BLD AUTO: 13.1 K/UL (ref 1.8–7.7)
NEUTROPHILS NFR BLD: 78.7 % (ref 38–73)
NRBC BLD-RTO: 0 /100 WBC
PHOSPHATE SERPL-MCNC: 2.8 MG/DL (ref 2.7–4.5)
PLATELET # BLD AUTO: 409 K/UL (ref 150–450)
PMV BLD AUTO: 10.5 FL (ref 9.2–12.9)
POCT GLUCOSE: 132 MG/DL (ref 70–110)
POCT GLUCOSE: 153 MG/DL (ref 70–110)
POTASSIUM SERPL-SCNC: 3.4 MMOL/L (ref 3.5–5.1)
PROT SERPL-MCNC: 6.2 G/DL (ref 6–8.4)
PROTHROMBIN TIME: 11.4 SEC (ref 9–12.5)
RBC # BLD AUTO: 2.85 M/UL (ref 4.6–6.2)
SODIUM SERPL-SCNC: 145 MMOL/L (ref 136–145)
WBC # BLD AUTO: 16.65 K/UL (ref 3.9–12.7)

## 2023-12-27 PROCEDURE — 25000003 PHARM REV CODE 250

## 2023-12-27 PROCEDURE — 63600175 PHARM REV CODE 636 W HCPCS

## 2023-12-27 PROCEDURE — 11000001 HC ACUTE MED/SURG PRIVATE ROOM

## 2023-12-27 PROCEDURE — 27100171 HC OXYGEN HIGH FLOW UP TO 24 HOURS

## 2023-12-27 PROCEDURE — 94660 CPAP INITIATION&MGMT: CPT

## 2023-12-27 PROCEDURE — 25000242 PHARM REV CODE 250 ALT 637 W/ HCPCS: Performed by: PSYCHIATRY & NEUROLOGY

## 2023-12-27 PROCEDURE — 80053 COMPREHEN METABOLIC PANEL: CPT

## 2023-12-27 PROCEDURE — 85610 PROTHROMBIN TIME: CPT | Performed by: HOSPITALIST

## 2023-12-27 PROCEDURE — 85025 COMPLETE CBC W/AUTO DIFF WBC: CPT

## 2023-12-27 PROCEDURE — 94640 AIRWAY INHALATION TREATMENT: CPT

## 2023-12-27 PROCEDURE — 25000003 PHARM REV CODE 250: Performed by: PSYCHIATRY & NEUROLOGY

## 2023-12-27 PROCEDURE — 82140 ASSAY OF AMMONIA: CPT | Performed by: HOSPITALIST

## 2023-12-27 PROCEDURE — 25000003 PHARM REV CODE 250: Performed by: REGISTERED NURSE

## 2023-12-27 PROCEDURE — 99233 SBSQ HOSP IP/OBS HIGH 50: CPT | Mod: ,,, | Performed by: PHYSICIAN ASSISTANT

## 2023-12-27 PROCEDURE — A4216 STERILE WATER/SALINE, 10 ML: HCPCS | Performed by: PSYCHIATRY & NEUROLOGY

## 2023-12-27 PROCEDURE — 63600175 PHARM REV CODE 636 W HCPCS: Performed by: HOSPITALIST

## 2023-12-27 PROCEDURE — 94761 N-INVAS EAR/PLS OXIMETRY MLT: CPT

## 2023-12-27 PROCEDURE — 83735 ASSAY OF MAGNESIUM: CPT

## 2023-12-27 PROCEDURE — 84100 ASSAY OF PHOSPHORUS: CPT

## 2023-12-27 PROCEDURE — 92526 ORAL FUNCTION THERAPY: CPT

## 2023-12-27 PROCEDURE — 25000003 PHARM REV CODE 250: Performed by: HOSPITALIST

## 2023-12-27 PROCEDURE — 99900035 HC TECH TIME PER 15 MIN (STAT)

## 2023-12-27 RX ADMIN — MEROPENEM 2 G: 1 INJECTION INTRAVENOUS at 02:12

## 2023-12-27 RX ADMIN — FUROSEMIDE 80 MG: 10 INJECTION, SOLUTION INTRAMUSCULAR; INTRAVENOUS at 12:12

## 2023-12-27 RX ADMIN — VANCOMYCIN HYDROCHLORIDE 125 MG: KIT at 05:12

## 2023-12-27 RX ADMIN — Medication 10 ML: at 12:12

## 2023-12-27 RX ADMIN — Medication 10 ML: at 06:12

## 2023-12-27 RX ADMIN — PSYLLIUM HUSK 1 PACKET: 3.4 POWDER ORAL at 09:12

## 2023-12-27 RX ADMIN — VANCOMYCIN HYDROCHLORIDE 125 MG: KIT at 12:12

## 2023-12-27 RX ADMIN — LOSARTAN POTASSIUM 25 MG: 25 TABLET, FILM COATED ORAL at 09:12

## 2023-12-27 RX ADMIN — MODAFINIL 200 MG: 100 TABLET ORAL at 05:12

## 2023-12-27 RX ADMIN — INSULIN ASPART 2 UNITS: 100 INJECTION, SOLUTION INTRAVENOUS; SUBCUTANEOUS at 12:12

## 2023-12-27 RX ADMIN — FLUTICASONE FUROATE AND VILANTEROL TRIFENATATE 1 PUFF: 200; 25 POWDER RESPIRATORY (INHALATION) at 08:12

## 2023-12-27 RX ADMIN — COLCHICINE 0.6 MG: 0.6 TABLET, FILM COATED ORAL at 09:12

## 2023-12-27 RX ADMIN — VANCOMYCIN HYDROCHLORIDE 125 MG: KIT at 06:12

## 2023-12-27 RX ADMIN — MEROPENEM 2 G: 1 INJECTION INTRAVENOUS at 12:12

## 2023-12-27 RX ADMIN — MEROPENEM 2 G: 1 INJECTION INTRAVENOUS at 10:12

## 2023-12-27 RX ADMIN — ENOXAPARIN SODIUM 40 MG: 40 INJECTION SUBCUTANEOUS at 04:12

## 2023-12-27 RX ADMIN — VANCOMYCIN HYDROCHLORIDE 1500 MG: 1.5 INJECTION, POWDER, LYOPHILIZED, FOR SOLUTION INTRAVENOUS at 04:12

## 2023-12-27 RX ADMIN — Medication 10 ML: at 05:12

## 2023-12-27 NOTE — PT/OT/SLP PROGRESS
Speech Language Pathology Treatment    Patient Name:  Bj Pate Jr.   MRN:  [id number]  Admitting Diagnosis: Acute osteomyelitis of lumbar spine    Recommendations:                 General Recommendations:  Dysphagia therapy  Diet recommendations:  Puree Diet - IDDSI Level 4, Liquid Diet Level: Thin liquids - IDDSI Level 0   Aspiration Precautions: Strict aspiration precautions   General Precautions: Standard, aspiration, fall  Communication strategies:  none    Assessment:     Bj Pate Jr. is a 73 y.o. male with an SLP diagnosis of Dysphagia.    Subjective     Nursing clearing pt for SLP tx.   Patient goals: none were stated     Pain/Comfort:   0/10 none stated    Respiratory Status: Room air    Objective:     Has the patient been evaluated by SLP for swallowing?   Yes  Keep patient NPO? No   Current Respiratory Status:    room air     Pt seen for ongoing dysphagia tx. Pt with waxing/waning alertness throughout the session. Pt accepting trials of thin liquids via straw sip and x6 tsp of puree solids. Pt with noted delayed AP bolus transit time and with x1 throat clearing with trial of puree, however further trials with no signs of aspiration noted. Pt declining trial of solids this date. No further trials administered. Recommend continue with current puree diet and thin liquids with aspiration precautions. Education provided on SLP role and impressions. Pt verbalizing understanding, however would benefit from ongoing education. Pt would benefit from ongoing monitoring of cognitive-linguistic skills and further SLP assessment. SLP will follow up.   Goals:   Multidisciplinary Problems       SLP Goals          Problem: SLP    Goal Priority Disciplines Outcome   SLP Goal     SLP Ongoing, Progressing   Description: Speech Language Pathology Goals  Goals expected to be met by 12/25/23    1. Pt will tolerate thin liquids w/o overt S/S aspiration, MIN A  2. Pt will participate in ongoing assessment of swallow  function to determine safest, least restrictive means of nutrition/hydration  3. Pt will participate in further assessment of cognitive-linguistic skills to determine ongoing POC needs  4. Educate Pt and family on aspiration precautions and SLP POC                         Plan:     Patient to be seen:  4 x/week   Plan of Care expires:  01/17/24  Plan of Care reviewed with:  patient   SLP Follow-Up:  Yes       Discharge recommendations:  Moderate Intensity Therapy   Barriers to Discharge:  None    Time Tracking:     SLP Treatment Date:   12/27/23  Speech Start Time:  1335  Speech Stop Time:  1353     Speech Total Time (min):  18 min    Billable Minutes: Treatment Swallowing Dysfunction 10 and Self Care/Home Management Training 8    12/27/2023

## 2023-12-27 NOTE — SUBJECTIVE & OBJECTIVE
Interval History:   MRI C-L spine poor study, repeating MRI L spine as possible new epidural abscess   NSY following, deferred repeat imaging   Mentation much improved  No acute complaints or concerns  Intra-op cultures NGTD    Review of Systems   Constitutional:  Positive for activity change and fatigue.   HENT:  Positive for dental problem.    Respiratory:  Negative for cough and shortness of breath.    Musculoskeletal:  Positive for back pain.   Skin:  Positive for wound.        +bilateral hand swelling   All other systems reviewed and are negative.    Objective:     Vital Signs (Most Recent):  Temp: 99.3 °F (37.4 °C) (12/27/23 1229)  Pulse: 104 (12/27/23 1131)  Resp: 18 (12/27/23 0816)  BP: 106/65 (12/27/23 0800)  SpO2: 96 % (12/27/23 0816) Vital Signs (24h Range):  Temp:  [97.7 °F (36.5 °C)-99.3 °F (37.4 °C)] 99.3 °F (37.4 °C)  Pulse:  [] 104  Resp:  [12-19] 18  SpO2:  [95 %-98 %] 96 %  BP: (106-128)/(63-68) 106/65     Weight: 119.7 kg (263 lb 14.3 oz)  Body mass index is 38.97 kg/m².    Estimated Creatinine Clearance: 96.2 mL/min (based on SCr of 0.9 mg/dL).     Physical Exam  Vitals reviewed.   Constitutional:       General: He is not in acute distress.     Appearance: He is not ill-appearing.   HENT:      Head: Normocephalic and atraumatic.      Comments: NG tube in place  Pulmonary:      Effort: Pulmonary effort is normal. No respiratory distress.      Breath sounds: Normal breath sounds. No stridor.   Abdominal:      General: Abdomen is flat. There is no distension.      Palpations: Abdomen is soft.      Tenderness: There is no abdominal tenderness.      Comments: With flexiseal    Genitourinary:     Comments: bryant  Musculoskeletal:      Right lower leg: No edema.      Left lower leg: No edema.      Comments: RUE> LUE edematous  Pain with movement and palpation of RUE   Skin:     General: Skin is warm and dry.      Comments: Midline incision clean, dry, intact.    Neurological:      Mental Status:  He is alert.          Significant Labs: All pertinent labs within the past 24 hours have been reviewed.    Significant Imaging: I have reviewed all pertinent imaging results/findings within the past 24 hours.

## 2023-12-27 NOTE — ASSESSMENT & PLAN NOTE
Echo 12/10/2023  . Mild global hypokinesis present. There is mildly reduced systolic function with a visually estimated ejection fraction of 45 - 50%.    Right Ventricle: Normal right ventricular cavity size. Wall thickness is normal. Right ventricle wall motion  is normal. Systolic function is normal.  Stable renal function w/ fluctuating LFTs;   -continue iV lasix and can resume losartan as BP tolerates

## 2023-12-27 NOTE — PROGRESS NOTES
"Connor Mina - Neurosurgery ([facility])  Adult Nutrition  Progress Note    SUMMARY       Recommendations    Continue Pureed diet as tolerated (per SLP).  Continue TF regimen of Impact Peptide @ 50 mL/hr - meeting needs.  RD following.     Goals: Meet % EEN, EPN by RD f/u date  Nutrition Goal Status: goal met  Communication of RD Recs: reviewed with RN    Assessment and Plan    Nutrition Problem:  Inadequate oral intake     Related to (etiology):   Inability to consume adequate nutrition     Signs and Symptoms (as evidenced by):   Pureed diet w/ poor PO intake     Interventions/Recommendations (treatment strategy):  Collaboration of nutritional care with other providers  EN     Nutrition Diagnosis Status:   Continues    Reason for Assessment    Reason For Assessment: RD follow-up  Diagnosis: infection/sepsis  Relevant Medical History: hx CVA, CKD3, HTN, DM2, COPD  Interdisciplinary Rounds: did not attend    General Information Comments: Diet per SLP; pt tolerating TFs via NGT. S/p L1 to L4 fusion. Pt appears nourished, weight loss noted since admit (likely 2/2 fluid status).  Nutrition Discharge Planning: Pending clinical course    Nutrition/Diet History    Spiritual, Cultural Beliefs, Orthodoxy Practices, Values that Affect Care: no  Food Allergies: other (see comments) (Tomato, Shrimp)  Factors Affecting Nutritional Intake: difficulty/impaired swallowing    Anthropometrics    Temp: 97.7 °F (36.5 °C)  Height Method: Stated  Height: 5' 9" (175.3 cm)  Height (inches): 69 in  Weight Method: Bed Scale  Weight: 119.7 kg (263 lb 14.3 oz)  Weight (lb): 263.89 lb  Ideal Body Weight (IBW), Male: 160 lb  % Ideal Body Weight, Male (lb): 164.93 %  BMI (Calculated): 39  BMI Grade: 35 - 39.9 - obesity - grade II    Lab/Procedures/Meds    Pertinent Labs Reviewed: reviewed  Pertinent Labs Comments: -  Pertinent Medications Reviewed: reviewed  Pertinent Medications Comments: -    Estimated/Assessed Needs    Weight Used For Calorie " Calculations: 119.7 kg (263 lb 14.3 oz)    Energy Calorie Requirements (kcal): 1942 kcal/d  Energy Need Method: [place]-St Jeor (1.0 PAL)    Protein Requirements: 108 g/d (.9 g/kg)  Weight Used For Protein Calculations: 119.7 kg (263 lb 14.3 oz)    Estimated Fluid Requirement Method: other (see comments) (Per MD or 1 mL/kcal)  RDA Method (mL): 1942    CHO Requirement: 243g    Nutrition Prescription Ordered    Current Diet Order: Pureed  Nutrition Order Comments: Boost Glucose Control ONS  Current Nutrition Support Formula Ordered: Impact Peptide 1.5  Current Nutrition Support Rate Ordered: 50 mL/hr    Evaluation of Received Nutrient/Fluid Intake    Enteral Calories (kcal): 1800  Enteral Protein (gm): 113  Enteral (Free Water) Fluid (mL): 924  Free Water Flush Fluid (mL): 200    % Kcal Needs: 93%  % Protein Needs: 104%    I/O: +1L since 12/13    Energy Calories Required: meeting needs  Protein Required: meeting needs  Fluid Required: other (see comments) (Per MD)    Comments: LBM: 12/26    Tolerance: tolerating    Nutrition Risk    Level of Risk/Frequency of Follow-up:  (1x/week)     Monitor and Evaluation    Food and Nutrient Intake: enteral nutrition intake, food and beverage intake, energy intake  Food and Nutrient Adminstration: diet order, enteral and parenteral nutrition administration  Physical Activity and Function: nutrition-related ADLs and IADLs  Anthropometric Measurements: weight change, weight  Biochemical Data, Medical Tests and Procedures: glucose/endocrine profile, inflammatory profile, lipid profile, gastrointestinal profile, electrolyte and renal panel  Nutrition-Focused Physical Findings: overall appearance     Nutrition Follow-Up    RD Follow-up?: Yes

## 2023-12-27 NOTE — ASSESSMENT & PLAN NOTE
Psoas abscess  Was admitted to [facility] for post operative monitoring following a L1-L4 segmental posterolateral fusion, L2-L4 laminectomy and L2-L3 TLIF for L2-L4 discitis/osteomyelitis on 12/15.   S/p IR biopsy of psoas abscess and L2-L3 disc space.   Persistent - Weakness in both UE and Les likely 2/2 spine/psoas infx  Fall precautions  -Continue  Meropenem and Vancomycin per ID recs

## 2023-12-27 NOTE — SUBJECTIVE & OBJECTIVE
Interval History:  Patient denies any chest pain, does endorse some abdominal pain on palpation. Hemoglobin dropped again, improved with another when PRBC.  Started on IV diuresis for some upper extremity global edema with improvement    Review of Systems  Objective:     Vital Signs (Most Recent):  Temp: 99.3 °F (37.4 °C) (12/27/23 1229)  Pulse: 105 (12/27/23 1457)  Resp: 18 (12/27/23 0816)  BP: 106/65 (12/27/23 0800)  SpO2: 96 % (12/27/23 0816) Vital Signs (24h Range):  Temp:  [97.7 °F (36.5 °C)-99.3 °F (37.4 °C)] 99.3 °F (37.4 °C)  Pulse:  [] 105  Resp:  [12-19] 18  SpO2:  [95 %-98 %] 96 %  BP: (106-128)/(63-68) 106/65     Weight: 119.7 kg (263 lb 14.3 oz)  Body mass index is 38.97 kg/m².    Intake/Output Summary (Last 24 hours) at 12/27/2023 1523  Last data filed at 12/27/2023 1214  Gross per 24 hour   Intake 3402.15 ml   Output 1100 ml   Net 2302.15 ml         Physical Exam      Clear lungs bilaterally, unlabored breathing, NG tube in place   Awake and alert but not following motor commands, maintaining eye contact, pupils equal bilaterally   2/5 bilateral upper extremity strength  2/5 bilateral hip flexion strength   Right upper extremity has fully resolved; mild left upper extremity edema noted  Nonsustained with the following motor commands   Does respond to pain questions with simple yes or no   Pupils equal bilaterally   No facial droop, no slurred speech   Heart sounds indicate a regular rate and rhythm   Abdomen mildly tender to deep palpation over right upper quadrant, soft, otherwise unremarkable   No obvious lower extremity edema

## 2023-12-27 NOTE — PROGRESS NOTES
Connor Mina - Neurosurgery ([facility])  [facility] Medicine  Progress Note    Patient Name: Bj Pate Jr.  MRN: [id number]  Patient Class: IP- Inpatient   Admission Date: 12/10/2023  Length of Stay: 16 days  Attending Physician: Christ Rudd MD  Primary Care Provider: Jose Antonio Foster MD        Subjective:     Principal Problem:Acute osteomyelitis of lumbar spine        HPI:  This is a 73M with a h/o CVA  about 8 to 10 years ago with R sided deficits, DM, HTN who was BIB from home with family with 3 weeks of abdominal pain. Seen at multiple EDS, and generally unremarkable work up as per family. Within last few days started to have progressive worsening back pain. Became acute altered 2 days ago w/, LE weakness now requiring assistance to walk, as well as urinary incontinence. Patient is A/O 0x at admit. Accompanied by Daughter, most of history from her.     Admission to ED patient was febrile at 102 F, tachycardic 130, tachypneic at 23, hypertensive at 140/72 initially satting well on room air later requiring 2 L nasal cannula.  WBCs elevated 12.74 anemia 11.0 platelets normal, sed rate 120+, BUN 27 magnesium 1.5, albumin 2.4, .1, troponin 0.126 urine trace proteinuria trace ketones lactate initially elevated 2.6 later 1.3 CT head unremarkable for acute processes CT abdomen and pelvis and MRI lumbar spine shows left psoas abscess L2-L3 osteomyleitis, and diskitis.  Neurosurgery was consulted recommendations are pending.  Received Cefepime and Vanc    Overview/Hospital Course:  Mr. Pate was admitted for abdominal pain and worsening mental status, and was found to have a collection in his left psoas muscle, diskitis, and osteomyelitis in the L2-L3 region.  He was placed on vancomycin and meropenem for continuing decline in mental status.  Interventional Radiology took a core biopsy of infected disc, but were unable to aspirate anything from the left psoas collection. Due to his poor mental status, an VITOR  tube and tube feedings were ordered to assist with his nutrition status. On 12/15 he returned to his mental baseline and was alert and having conversations prior to an L1-L4 fusion and decompression with neurosurgery. Was admitted to [facility] for post operative monitoring following a L1-L4 segmental posterolateral fusion, L2-L4 laminectomy and L2-L3 TLIF for L2-L4 discitis/osteomyelitis on 12/15: Went for CTA because of lethargy since surgery. Did not tolerate procedure, returned to [facility]. Sedated and intubated for MRI pan spine, as patient had post surgical changes in exam. Continuing Vanc and Deo. MRI with stable findings except for typical post-operative findings. No further surgical plans per NSGY. Provigil given to improve wakefulness, will start daily. Extubated 11/17/2023. 12/19 PICC consulted placed for long-term IV antibiotic administration. Lasix 40mg IV given for BUE swelling. US extremities ordered to rule out DVT. Showed superficial thrombophlebitis of the right cephalic vein.  Developed persistent acute transaminitis noted on 12/21 in hepatocellular pattern.  CT abdomen and ultrasound Doppler negative for any inexplicable hepatobiliary pathology w/ neg viral hep panel. Hb dropped to 6s on 12/25, got 1 prbc.  Hemoglobin dropped again, improved with another when PRBC.  Started on IV diuresis for some upper extremity global edema with improvement    Interval History:  Patient denies any chest pain, does endorse some abdominal pain on palpation. Hemoglobin dropped again, improved with another when PRBC.  Started on IV diuresis for some upper extremity global edema with improvement    Review of Systems  Objective:     Vital Signs (Most Recent):  Temp: 99.3 °F (37.4 °C) (12/27/23 1229)  Pulse: 105 (12/27/23 1457)  Resp: 18 (12/27/23 0816)  BP: 106/65 (12/27/23 0800)  SpO2: 96 % (12/27/23 0816) Vital Signs (24h Range):  Temp:  [97.7 °F (36.5 °C)-99.3 °F (37.4 °C)] 99.3 °F (37.4 °C)  Pulse:  [] 105  Resp:   [12-19] 18  SpO2:  [95 %-98 %] 96 %  BP: (106-128)/(63-68) 106/65     Weight: 119.7 kg (263 lb 14.3 oz)  Body mass index is 38.97 kg/m².    Intake/Output Summary (Last 24 hours) at 12/27/2023 1523  Last data filed at 12/27/2023 1214  Gross per 24 hour   Intake 3402.15 ml   Output 1100 ml   Net 2302.15 ml         Physical Exam      Clear lungs bilaterally, unlabored breathing, NG tube in place   Awake and alert but not following motor commands, maintaining eye contact, pupils equal bilaterally   2/5 bilateral upper extremity strength  2/5 bilateral hip flexion strength   Right upper extremity has fully resolved; mild left upper extremity edema noted  Nonsustained with the following motor commands   Does respond to pain questions with simple yes or no   Pupils equal bilaterally   No facial droop, no slurred speech   Heart sounds indicate a regular rate and rhythm   Abdomen mildly tender to deep palpation over right upper quadrant, soft, otherwise unremarkable   No obvious lower extremity edema      Assessment/Plan:      * Acute osteomyelitis of lumbar spine  Psoas abscess  Was admitted to [facility] for post operative monitoring following a L1-L4 segmental posterolateral fusion, L2-L4 laminectomy and L2-L3 TLIF for L2-L4 discitis/osteomyelitis on 12/15.   S/p IR biopsy of psoas abscess and L2-L3 disc space.   Persistent - Weakness in both UE and Les likely 2/2 spine/psoas infx  Fall precautions  -Continue  Meropenem and Vancomycin per ID recs    Discitis  See severe sepsis    Acute metabolic encephalopathy  Septic encephalopathy  delirium  Pain  polypharmacy    Neurology consulted, low suspicion for seizures, possibly gout flare per their assessment but this seems unlikely given his presentation and MRI/CT findings  Placed on provigil due to somnolence    Transaminitis  -Acute bump noted  -700s --> 300s --> 200s --> 500s; concern for -hypoperfusion of the liver per hepatology; maybe hepatic congestion  -Stable bilirubin; no  N/V  -No obvious DILI per pharm review  -unremarkable Ammonia, hep viral panel, INR, CPK, GGT, US abd, CT abd findings  -Serological workup in process per hepatology consult,   -Was stable haptoglobin  and bilirubin hematology input feels no hemolysis is at play  -Fluctuating ALT/AST in hepatocellular pattern      Hematocrit precipitous drop  1 unit prbc on 12/25/23  FOBT pending  Persistent hb low, recheck pending   trend      Acute on chronic systolic heart failure  Echo 12/10/2023  . Mild global hypokinesis present. There is mildly reduced systolic function with a visually estimated ejection fraction of 45 - 50%.    Right Ventricle: Normal right ventricular cavity size. Wall thickness is normal. Right ventricle wall motion  is normal. Systolic function is normal.  Stable renal function w/ fluctuating LFTs;   -continue iV lasix and can resume losartan as BP tolerates    Leukocytosis  Could be related to transaminitis vs infx  F/u abd CT abd  Continue Invanz and IV vanc; also continue oral vanc per ID (for C diff prevention given hx) for long course - see their note on 12/27      Pain of left hand  Maybe gout?   Continue colchicine trial    Thrombophlebitis  RUE cephalic vein thrombus      Hemiparesis  History of R sided hemiparesis from prior CVA. Mostly resolved per family.  PT/OT     Stercoral colitis  CT AP w rectal colitis vs constipation    - brown bomb on 12/14 with large bowel movement  - Aggressive Bowel regimen after surgery    Back pain  Likely secondary to L2-L3 diskitis/ostemyelitis.    MRI spine completed  NSGY consulted. Surgery today for L1-L4 fusion and decompression  Pain meds PRN    Psoas muscle abscess  See severe sepsis    Malnutrition  Maintenance IVFs  NG tube placement ordered  RDN consulted, appreciate recs  Tube feeds held for surgery today    Urinary retention  Rivera placed by ED, PVR now that he has external condom catheter. Due to diskitis/osteomyelitis, concern for possible spinal cord  compression.    Bladder scan as needed  Document PVRs    Debility  Weakness in both UE and Les likely 2/2 spine/psoas infx  Fall precautions  Anesthesia based repeat MRI spine pending    History of CVA (cerebrovascular accident)  -R thalamic, 2016  No acute processes on head CT or brain MRI      VTE Risk Mitigation (From admission, onward)           Ordered     enoxaparin injection 40 mg  Every 24 hours         12/26/23 0807     IP VTE HIGH RISK PATIENT  Once         12/11/23 0652     Place sequential compression device  Until discontinued         12/11/23 0652                    Discharge Planning   GLORIA: 12/29/2023     Code Status: Full Code   Is the patient medically ready for discharge?: No    Reason for patient still in hospital (select all that apply): Patient trending condition, Laboratory test, and Treatment  Discharge Plan A: Long-term acute care facility (LTAC)   Discharge Delays: (!) Change in Medical Condition              Christ Rudd MD  Department of Hospital Medicine   [facility] - Neurosurgery ([facility])

## 2023-12-27 NOTE — PLAN OF CARE
POC reviewed with Bj Pate Jr. and family at 0300. Pt verbalized understanding. Questions and concerns addressed. No acute events overnight. Pt progressing toward goals. Will continue to monitor. See below and flowsheets for full assessment and VS info.     Is this a stroke patient? no    Neuro:  Bouton Coma Scale  Best Eye Response: 4-->(E4) spontaneous  Best Motor Response: 6-->(M6) obeys commands  Best Verbal Response: 4-->(V4) confused  Bouton Coma Scale Score: 14  Assessment Qualifiers: patient not sedated/intubated, no eye obstruction present  Pupil PERRLA: yes     24hr Temp:  [97.8 °F (36.6 °C)-98.9 °F (37.2 °C)]     CV:   Rhythm: normal sinus rhythm  BP goals:   SBP < 220  MAP > 65    Resp:      Vent Mode: Spont  Set Rate: 18 BPM  Oxygen Concentration (%): 21  Vt Set: 480 mL  PEEP/CPAP: 5 cmH20  Pressure Support: 5 cmH20    Plan: N/A    GI/:     Diet/Nutrition Received: mechanical/dental soft  Last Bowel Movement: 12/26/23  Voiding Characteristics: external catheter    Intake/Output Summary (Last 24 hours) at 12/27/2023 0119  Last data filed at 12/26/2023 2200  Gross per 24 hour   Intake 767.08 ml   Output --   Net 767.08 ml     Unmeasured Output  Urine Occurrence: 4  Stool Occurrence: 1    Labs/Accuchecks:  Recent Labs   Lab 12/26/23  0855   WBC 17.06*   RBC 2.42*   HGB 7.0*   HCT 22.0*         Recent Labs   Lab 12/26/23  0855   *   K 3.2*   CO2 30*      BUN 44*   CREATININE 0.9   ALKPHOS 203*   *   *   BILITOT 0.7      Recent Labs   Lab 12/24/23  0936   INR 1.1      Recent Labs   Lab 12/22/23  1205   CPK 36       Electrolytes: N/A - electrolytes WDL  Accuchecks: ACHS    Gtts:   sodium chloride 0.9% 50 mL/hr at 12/26/23 1550       LDA/Wounds:  Lines/Drains/Airways       Peripherally Inserted Central Catheter Line  Duration             PICC Double Lumen 12/19/23 1609 left brachial 7 days              Drain  Duration                  NG/OG Tube 12/14/23 0800  nasogastric Left nostril 12 days         Rectal Tube 12/26/23 1757 fecal management system <1 day    Female External Urinary Catheter w/ Suction 12/26/23 1756 <1 day                  Wounds: Yes  Wound care consulted: Yes

## 2023-12-27 NOTE — PLAN OF CARE
Connor Mina - Neurosurgery (Hospital)  Discharge Reassessment    Primary Care Provider: Jose Antonio Fosetr MD    Expected Discharge Date: 12/29/2023    Reassessment (most recent)       Discharge Reassessment - 12/27/23 1132          Discharge Reassessment    Assessment Type Discharge Planning Reassessment (P)      Did the patient's condition or plan change since previous assessment? Yes (P)      Communicated GLORIA with patient/caregiver Date not available/Unable to determine (P)      Discharge Plan A Long-term acute care facility (LTAC) (P)      Discharge Plan B Home Health (P)      DME Needed Upon Discharge  none (P)      Transition of Care Barriers None (P)         Post-Acute Status    Post-Acute Authorization Placement (P)      Post-Acute Placement Status Set-up Complete/Auth obtained (P)      Discharge Delays Change in Medical Condition (P)                    Patient not medically cleared.  Patient has been accepted at [facility] and will transfer when medically ready.      Discharge Plan A and Plan B have been determined by review of patient's clinical status, future medical and therapeutic needs, and coverage/benefits for post-acute care in coordination with multidisciplinary team members.    Ana Maria Swift, FREDDIE  [facility]  [phone]

## 2023-12-27 NOTE — ASSESSMENT & PLAN NOTE
74 yo male with PMH of CVA, CKD3, HTN, DM2, COPD admitted with AMS s/p fall found to have L psoas abscess/lumbar OM s/p IR aspiration of L2/3 disc space (cx ngtd) and washout with lumbar fusion/TLIF/laminectomy with NSGY on 12/15. Intra-op cultures remain NGTD.    Op note with purulent material. Post-op MRI revealed stable lumbar spondylodiscitis and L psoas abscess, possible cervical spondylodiscitis and bibasilar consolidations. Also noted to have possible new epidural abscess [id number], Examination is significantly degraded by motion artifact and is essentially nondiagnostic. Pt remained afebrile, stable, mentation much improved. Incision c/d/I.       Recommendations:  Continue empiric vancomycin. pharm to dose. Continue meropenem 2g IV 8hr. Anticipate 6-8 wks IV abx.  NSGY following, recommend f/u outpatient with repeat imaging. MRI L spine deferred at this time.   Continue oral vanc to empirically treat for cdiff as pt with history of C.diff and anticipating long term IV abx therapy     Discussed with ID staff. ID will sign off    Outpatient Antibiotic Therapy Plan:    Please send referral to [facility].    1) Infection:  osteomyelitis    2) Discharge Antibiotics:    Intravenous antibiotics:  Vancomycin  Meropenem      3) Therapy Duration:  6-8 weeks with repeat imaging     Estimated end date of IV antibiotics: 1/26-2/9/24    4) Outpatient Weekly Labs:    Order the following labs to be drawn on Mondays:   CBC  CMP   CRP    CPK (when on Daptomycin)  Vancomycin trough. Target 15-20    If discharged on vancomycin IV, order the following additional labs to be drawn on Thursdays:  CMP   Vancomycin trough. Target 15-20    If vancomycin trough is not at target (15-20) prior to discharge, schedule vancomycin trough to be drawn before their fourth outpatient dose.    5) Fax Lab Results to Infectious Diseases Provider: isabela meza    [facility] Fax Number: [phone]    6)  Outpatient Infectious Diseases Follow-up    Follow-up appointment will be arranged by the ID clinic and will be found in the patient's appointments tab.    Prior to discharge, please ensure the patient's follow-up has been scheduled.    If there is still no follow-up scheduled prior to discharge, please send an EPIC message to Marjorie Mora in Infectious Diseases.

## 2023-12-27 NOTE — PROGRESS NOTES
Connor Mina - Neurosurgery ([facility])  Infectious Disease  Progress Note    Patient Name: Bj Pate Jr.  MRN: [id number]  Admission Date: 12/10/2023  Length of Stay: 16 days  Attending Physician: Christ Rudd MD  Primary Care Provider: Jose Antonio Foster MD    Isolation Status: No active isolations  Assessment/Plan:      ID  * Acute osteomyelitis of lumbar spine  72 yo male with PMH of CVA, CKD3, HTN, DM2, COPD admitted with AMS s/p fall found to have L psoas abscess/lumbar OM s/p IR aspiration of L2/3 disc space (cx ngtd) and washout with lumbar fusion/TLIF/laminectomy with NSGY on 12/15. Intra-op cultures remain NGTD.    Op note with purulent material. Post-op MRI revealed stable lumbar spondylodiscitis and L psoas abscess, possible cervical spondylodiscitis and bibasilar consolidations. Also noted to have possible new epidural abscess E05-L9-1, Examination is significantly degraded by motion artifact and is essentially nondiagnostic. Pt remained afebrile, stable, mentation much improved. Incision c/d/I.       Recommendations:  Continue empiric vancomycin. pharm to dose. Continue meropenem 2g IV 8hr. Anticipate 6-8 wks IV abx.  NSGY following, recommend f/u outpatient with repeat imaging. MRI L spine deferred at this time.   Continue oral vanc to empirically treat for cdiff as pt with history of C.diff and anticipating long term IV abx therapy     Discussed with ID staff. ID will sign off    Outpatient Antibiotic Therapy Plan:    Please send referral to [facility].    1) Infection:  osteomyelitis    2) Discharge Antibiotics:    Intravenous antibiotics:  Vancomycin  Meropenem      3) Therapy Duration:  6-8 weeks with repeat imaging     Estimated end date of IV antibiotics: 1/26-2/9/24    4) Outpatient Weekly Labs:    Order the following labs to be drawn on Mondays:   CBC  CMP   CRP    CPK (when on Daptomycin)  Vancomycin trough. Target 15-20    If discharged on vancomycin  IV, order the following additional labs to be drawn on Thursdays:  CMP   Vancomycin trough. Target 15-20    If vancomycin trough is not at target (15-20) prior to discharge, schedule vancomycin trough to be drawn before their fourth outpatient dose.    5) Fax Lab Results to Infectious Diseases Provider: isabela li    [facility] Fax Number: [phone]    6) Outpatient Infectious Diseases Follow-up    Follow-up appointment will be arranged by the ID clinic and will be found in the patient's appointments tab.    Prior to discharge, please ensure the patient's follow-up has been scheduled.    If there is still no follow-up scheduled prior to discharge, please send an EPIC message to Marjorie Mora in Infectious Diseases.          Thank you for your consult. I will sign off. Please contact us if you have any additional questions.    Isabela Li PA-C  Infectious Disease  [facility] - Neurosurgery ([facility])    Subjective:     Principal Problem:Acute osteomyelitis of lumbar spine    HPI: Mr. Pate is a 74 yo male with PMH of CVA, CKD3, HTN, DM2, COPD who presents with increasing lethargy and confusion.     Pt remains altered during assessment, history gathered per family at bedside and chart review. Per chart review, pt reported ~3 weeks of low back pain, as well as a fall leading up to presentation. Following the fall, pt reported lower extremity weakness and urinary retention. Daughter at bedside states that pt has not had any surgical procedures or injections recently, but was treated with pain management in 2019 and had lumbar and cervical injections. Pt is retired, but was a  previously. Family denied him having pets, denied adventurous hobbies, denied raw food intake. Daughter reported recent ER admission for abdominal pain. States pt required medication for a parasite that improved his symptoms. Upon chart review, appears pt presented in August with abdominal pain, CT AP was negative for  acute findings. Pt was discharged and followed up with PCP shortly after and stated his symptoms had improved.     On admission, pt was febrile to 102, and tachycardic, with increasing leukocytosis, 13->16k, stable anemia, elevated sed/CRP: >120, 375, procal 1.37. blood cultures negative on admission. MRI lumbar spine noting discitis/osteomyelitis at L2-3 with left psoas abscess (2.2cm), as well as early osteo L3-4, noting difficult study and epidural abscess was difficult to exclude. CT head without evidence of superimposed acute intracranial process. MRI brain, cervical spine, and thoracic spine pending.     NSGY following and recommended IR consult for potential psoas abscess drainage and culture. Planning L2-pelvis decompression and fusion on 12/15/23.  IR planning CT guided aspiration/drainage of psoas abscess on either 12/12 or 12/13.     Pt is currently on vanc and cefepime. ID was consulted for antibiotic recs.       Interval History:   MRI C-L spine poor study, repeating MRI L spine as possible new epidural abscess   NSY following, deferred repeat imaging   Mentation much improved  No acute complaints or concerns  Intra-op cultures NGTD    Review of Systems   Constitutional:  Positive for activity change and fatigue.   HENT:  Positive for dental problem.    Respiratory:  Negative for cough and shortness of breath.    Musculoskeletal:  Positive for back pain.   Skin:  Positive for wound.        +bilateral hand swelling   All other systems reviewed and are negative.    Objective:     Vital Signs (Most Recent):  Temp: 99.3 °F (37.4 °C) (12/27/23 1229)  Pulse: 104 (12/27/23 1131)  Resp: 18 (12/27/23 0816)  BP: 106/65 (12/27/23 0800)  SpO2: 96 % (12/27/23 0816) Vital Signs (24h Range):  Temp:  [97.7 °F (36.5 °C)-99.3 °F (37.4 °C)] 99.3 °F (37.4 °C)  Pulse:  [] 104  Resp:  [12-19] 18  SpO2:  [95 %-98 %] 96 %  BP: (106-128)/(63-68) 106/65     Weight: 119.7 kg (263 lb 14.3 oz)  Body mass index is 38.97  kg/m².    Estimated Creatinine Clearance: 96.2 mL/min (based on SCr of 0.9 mg/dL).     Physical Exam  Vitals reviewed.   Constitutional:       General: He is not in acute distress.     Appearance: He is not ill-appearing.   HENT:      Head: Normocephalic and atraumatic.      Comments: NG tube in place  Pulmonary:      Effort: Pulmonary effort is normal. No respiratory distress.      Breath sounds: Normal breath sounds. No stridor.   Abdominal:      General: Abdomen is flat. There is no distension.      Palpations: Abdomen is soft.      Tenderness: There is no abdominal tenderness.      Comments: With flexiseal    Genitourinary:     Comments: bryant  Musculoskeletal:      Right lower leg: No edema.      Left lower leg: No edema.      Comments: RUE> LUE edematous  Pain with movement and palpation of RUE   Skin:     General: Skin is warm and dry.      Comments: Midline incision clean, dry, intact.    Neurological:      Mental Status: He is alert.          Significant Labs: All pertinent labs within the past 24 hours have been reviewed.    Significant Imaging: I have reviewed all pertinent imaging results/findings within the past 24 hours.

## 2023-12-27 NOTE — PLAN OF CARE
Problem: Infection  Goal: Absence of Infection Signs and Symptoms  Outcome: Ongoing, Progressing     Problem: Adult Inpatient Plan of Care  Goal: Plan of Care Review  Outcome: Ongoing, Progressing  Goal: Patient-Specific Goal (Individualized)  Description: Pt. Will maintain sbp below 160  Outcome: Ongoing, Progressing  Goal: Absence of Hospital-Acquired Illness or Injury  Outcome: Ongoing, Progressing  Goal: Optimal Comfort and Wellbeing  Outcome: Ongoing, Progressing  Goal: Readiness for Transition of Care  Outcome: Ongoing, Progressing   POC and meds reviewed with patient and family.Patient is awake and alert, remains confused.feeding per NGT in progress, patient tolerating.Bed is in low position , patient turned , pillows and wedges used.Will continue to monitor.

## 2023-12-27 NOTE — ASSESSMENT & PLAN NOTE
Could be related to transaminitis vs infx  F/u abd CT abd  Continue Invanz and IV vanc; also continue oral vanc per ID (for C diff prevention given hx) for long course - see their note on 12/27

## 2023-12-28 ENCOUNTER — OUTPATIENT CASE MANAGEMENT (OUTPATIENT)
Dept: ADMINISTRATIVE | Facility: OTHER | Age: 73
End: 2023-12-28
Payer: MEDICARE

## 2023-12-28 PROBLEM — R52 PAIN: Status: ACTIVE | Noted: 2023-12-28

## 2023-12-28 PROBLEM — D62 ACUTE BLOOD LOSS ANEMIA: Status: ACTIVE | Noted: 2023-12-28

## 2023-12-28 LAB
ALBUMIN SERPL BCP-MCNC: 1.1 G/DL (ref 3.5–5.2)
ALP SERPL-CCNC: 202 U/L (ref 55–135)
ALT SERPL W/O P-5'-P-CCNC: 195 U/L (ref 10–44)
ANION GAP SERPL CALC-SCNC: 6 MMOL/L (ref 8–16)
AST SERPL-CCNC: 210 U/L (ref 10–40)
BASOPHILS # BLD AUTO: 0.02 K/UL (ref 0–0.2)
BASOPHILS NFR BLD: 0.1 % (ref 0–1.9)
BILIRUB SERPL-MCNC: 0.5 MG/DL (ref 0.1–1)
BUN SERPL-MCNC: 47 MG/DL (ref 8–23)
CALCIUM SERPL-MCNC: 8.1 MG/DL (ref 8.7–10.5)
CHLORIDE SERPL-SCNC: 105 MMOL/L (ref 95–110)
CO2 SERPL-SCNC: 31 MMOL/L (ref 23–29)
CREAT SERPL-MCNC: 1 MG/DL (ref 0.5–1.4)
DIFFERENTIAL METHOD BLD: ABNORMAL
EOSINOPHIL # BLD AUTO: 0.4 K/UL (ref 0–0.5)
EOSINOPHIL NFR BLD: 2.7 % (ref 0–8)
ERYTHROCYTE [DISTWIDTH] IN BLOOD BY AUTOMATED COUNT: 16.5 % (ref 11.5–14.5)
EST. GFR  (NO RACE VARIABLE): >60 ML/MIN/1.73 M^2
GLUCOSE SERPL-MCNC: 132 MG/DL (ref 70–110)
HCT VFR BLD AUTO: 22.6 % (ref 40–54)
HGB BLD-MCNC: 7.7 G/DL (ref 14–18)
IMM GRANULOCYTES # BLD AUTO: 0.4 K/UL (ref 0–0.04)
IMM GRANULOCYTES NFR BLD AUTO: 2.6 % (ref 0–0.5)
LYMPHOCYTES # BLD AUTO: 1.4 K/UL (ref 1–4.8)
LYMPHOCYTES NFR BLD: 9.1 % (ref 18–48)
MAGNESIUM SERPL-MCNC: 1.6 MG/DL (ref 1.6–2.6)
MCH RBC QN AUTO: 29.2 PG (ref 27–31)
MCHC RBC AUTO-ENTMCNC: 34.1 G/DL (ref 32–36)
MCV RBC AUTO: 86 FL (ref 82–98)
MONOCYTES # BLD AUTO: 1.2 K/UL (ref 0.3–1)
MONOCYTES NFR BLD: 7.8 % (ref 4–15)
NEUTROPHILS # BLD AUTO: 12.2 K/UL (ref 1.8–7.7)
NEUTROPHILS NFR BLD: 77.7 % (ref 38–73)
NRBC BLD-RTO: 0 /100 WBC
PHOSPHATE SERPL-MCNC: 2.6 MG/DL (ref 2.7–4.5)
PLATELET # BLD AUTO: 334 K/UL (ref 150–450)
PMV BLD AUTO: 9.5 FL (ref 9.2–12.9)
POCT GLUCOSE: 128 MG/DL (ref 70–110)
POCT GLUCOSE: 152 MG/DL (ref 70–110)
POCT GLUCOSE: 164 MG/DL (ref 70–110)
POTASSIUM SERPL-SCNC: 3.1 MMOL/L (ref 3.5–5.1)
PROT SERPL-MCNC: 6.1 G/DL (ref 6–8.4)
RBC # BLD AUTO: 2.64 M/UL (ref 4.6–6.2)
SMOOTH MUSCLE AB TITR SER IF: ABNORMAL {TITER}
SODIUM SERPL-SCNC: 142 MMOL/L (ref 136–145)
VANCOMYCIN TROUGH SERPL-MCNC: 22.1 UG/ML (ref 10–22)
WBC # BLD AUTO: 15.66 K/UL (ref 3.9–12.7)

## 2023-12-28 PROCEDURE — 97535 SELF CARE MNGMENT TRAINING: CPT | Mod: CO

## 2023-12-28 PROCEDURE — 63600175 PHARM REV CODE 636 W HCPCS: Performed by: HOSPITALIST

## 2023-12-28 PROCEDURE — 63600175 PHARM REV CODE 636 W HCPCS: Performed by: INTERNAL MEDICINE

## 2023-12-28 PROCEDURE — 92526 ORAL FUNCTION THERAPY: CPT

## 2023-12-28 PROCEDURE — 25000003 PHARM REV CODE 250: Performed by: INTERNAL MEDICINE

## 2023-12-28 PROCEDURE — 94660 CPAP INITIATION&MGMT: CPT

## 2023-12-28 PROCEDURE — 25000003 PHARM REV CODE 250: Performed by: REGISTERED NURSE

## 2023-12-28 PROCEDURE — 94799 UNLISTED PULMONARY SVC/PX: CPT

## 2023-12-28 PROCEDURE — 84100 ASSAY OF PHOSPHORUS: CPT

## 2023-12-28 PROCEDURE — 11000001 HC ACUTE MED/SURG PRIVATE ROOM

## 2023-12-28 PROCEDURE — 25000003 PHARM REV CODE 250

## 2023-12-28 PROCEDURE — 80202 ASSAY OF VANCOMYCIN: CPT | Performed by: HOSPITALIST

## 2023-12-28 PROCEDURE — A4216 STERILE WATER/SALINE, 10 ML: HCPCS | Performed by: PSYCHIATRY & NEUROLOGY

## 2023-12-28 PROCEDURE — 25000003 PHARM REV CODE 250: Performed by: PSYCHIATRY & NEUROLOGY

## 2023-12-28 PROCEDURE — 25000003 PHARM REV CODE 250: Performed by: HOSPITALIST

## 2023-12-28 PROCEDURE — 85025 COMPLETE CBC W/AUTO DIFF WBC: CPT

## 2023-12-28 PROCEDURE — 63600175 PHARM REV CODE 636 W HCPCS

## 2023-12-28 PROCEDURE — 99024 POSTOP FOLLOW-UP VISIT: CPT | Mod: ,,, | Performed by: PHYSICIAN ASSISTANT

## 2023-12-28 PROCEDURE — 97530 THERAPEUTIC ACTIVITIES: CPT | Mod: CO

## 2023-12-28 PROCEDURE — 83735 ASSAY OF MAGNESIUM: CPT

## 2023-12-28 PROCEDURE — 99900035 HC TECH TIME PER 15 MIN (STAT)

## 2023-12-28 PROCEDURE — 94761 N-INVAS EAR/PLS OXIMETRY MLT: CPT

## 2023-12-28 PROCEDURE — 25000242 PHARM REV CODE 250 ALT 637 W/ HCPCS: Performed by: PSYCHIATRY & NEUROLOGY

## 2023-12-28 PROCEDURE — 97535 SELF CARE MNGMENT TRAINING: CPT

## 2023-12-28 PROCEDURE — 80053 COMPREHEN METABOLIC PANEL: CPT

## 2023-12-28 RX ORDER — POTASSIUM CHLORIDE 7.45 MG/ML
10 INJECTION INTRAVENOUS
Status: CANCELLED | OUTPATIENT
Start: 2023-12-28 | End: 2023-12-28

## 2023-12-28 RX ORDER — LANOLIN ALCOHOL/MO/W.PET/CERES
400 CREAM (GRAM) TOPICAL 3 TIMES DAILY
Qty: 90 TABLET | Refills: 0
Start: 2023-12-28 | End: 2024-01-27

## 2023-12-28 RX ORDER — MAGNESIUM SULFATE HEPTAHYDRATE 40 MG/ML
2 INJECTION, SOLUTION INTRAVENOUS
Status: COMPLETED | OUTPATIENT
Start: 2023-12-28 | End: 2023-12-28

## 2023-12-28 RX ORDER — FUROSEMIDE 40 MG/1
40 TABLET ORAL DAILY
Qty: 60 TABLET | Refills: 0 | Status: SHIPPED | OUTPATIENT
Start: 2023-12-28 | End: 2023-12-28

## 2023-12-28 RX ORDER — COLCHICINE 0.6 MG/1
0.6 TABLET ORAL
Qty: 12 TABLET | Refills: 11 | Status: SHIPPED | OUTPATIENT
Start: 2023-12-29 | End: 2024-12-28

## 2023-12-28 RX ORDER — SODIUM,POTASSIUM PHOSPHATES 280-250MG
2 POWDER IN PACKET (EA) ORAL
Refills: 0
Start: 2023-12-28 | End: 2023-12-28

## 2023-12-28 RX ORDER — SODIUM,POTASSIUM PHOSPHATES 280-250MG
2 POWDER IN PACKET (EA) ORAL
Refills: 0
Start: 2023-12-28 | End: 2024-01-04

## 2023-12-28 RX ORDER — POTASSIUM CHLORIDE 750 MG/1
30 CAPSULE, EXTENDED RELEASE ORAL
Qty: 21 CAPSULE | Refills: 0
Start: 2023-12-28 | End: 2023-12-28 | Stop reason: HOSPADM

## 2023-12-28 RX ORDER — FUROSEMIDE 80 MG/1
80 TABLET ORAL DAILY
Qty: 60 TABLET | Refills: 0
Start: 2023-12-28 | End: 2024-12-27

## 2023-12-28 RX ORDER — LOSARTAN POTASSIUM 25 MG/1
25 TABLET ORAL DAILY
Qty: 90 TABLET | Refills: 3
Start: 2023-12-29 | End: 2024-12-28

## 2023-12-28 RX ADMIN — MEROPENEM 2 G: 1 INJECTION INTRAVENOUS at 05:12

## 2023-12-28 RX ADMIN — MAGNESIUM SULFATE HEPTAHYDRATE 2 G: 40 INJECTION, SOLUTION INTRAVENOUS at 12:12

## 2023-12-28 RX ADMIN — Medication 10 ML: at 12:12

## 2023-12-28 RX ADMIN — POTASSIUM PHOSPHATE, MONOBASIC POTASSIUM PHOSPHATE, DIBASIC 30 MMOL: 224; 236 INJECTION, SOLUTION, CONCENTRATE INTRAVENOUS at 01:12

## 2023-12-28 RX ADMIN — MODAFINIL 200 MG: 100 TABLET ORAL at 05:12

## 2023-12-28 RX ADMIN — LOSARTAN POTASSIUM 25 MG: 25 TABLET, FILM COATED ORAL at 08:12

## 2023-12-28 RX ADMIN — VANCOMYCIN HYDROCHLORIDE 125 MG: KIT at 12:12

## 2023-12-28 RX ADMIN — PSYLLIUM HUSK 1 PACKET: 3.4 POWDER ORAL at 08:12

## 2023-12-28 RX ADMIN — COLCHICINE 0.6 MG: 0.6 TABLET, FILM COATED ORAL at 08:12

## 2023-12-28 RX ADMIN — FUROSEMIDE 80 MG: 10 INJECTION, SOLUTION INTRAMUSCULAR; INTRAVENOUS at 12:12

## 2023-12-28 RX ADMIN — MAGNESIUM SULFATE HEPTAHYDRATE 2 G: 40 INJECTION, SOLUTION INTRAVENOUS at 02:12

## 2023-12-28 RX ADMIN — VANCOMYCIN HYDROCHLORIDE 125 MG: KIT at 06:12

## 2023-12-28 RX ADMIN — ENOXAPARIN SODIUM 40 MG: 40 INJECTION SUBCUTANEOUS at 05:12

## 2023-12-28 RX ADMIN — Medication 10 ML: at 05:12

## 2023-12-28 RX ADMIN — MEROPENEM 2 G: 1 INJECTION INTRAVENOUS at 12:12

## 2023-12-28 RX ADMIN — INSULIN ASPART 2 UNITS: 100 INJECTION, SOLUTION INTRAVENOUS; SUBCUTANEOUS at 06:12

## 2023-12-28 RX ADMIN — VANCOMYCIN HYDROCHLORIDE 125 MG: KIT at 05:12

## 2023-12-28 RX ADMIN — Medication 10 ML: at 06:12

## 2023-12-28 NOTE — PLAN OF CARE
POC reviewed with Bj Pate Jr. and family at 0300. Pt verbalized understanding. Questions and concerns addressed. No acute events overnight. Pt progressing toward goals. Will continue to monitor. See below and flowsheets for full assessment and VS info.           Is this a stroke patient? no    Neuro:  Jaja Coma Scale  Best Eye Response: 4-->(E4) spontaneous  Best Motor Response: 6-->(M6) obeys commands  Best Verbal Response: 4-->(V4) confused  Jaja Coma Scale Score: 14  Assessment Qualifiers: patient not sedated/intubated, no eye obstruction present  Pupil PERRLA: yes     24hr Temp:  [97.7 °F (36.5 °C)-99.3 °F (37.4 °C)]     CV:   Rhythm: sinus tachycardia  BP goals:   SBP < 180  MAP > 65    Resp:      Vent Mode: Spont  Set Rate: 18 BPM  Oxygen Concentration (%): 21  Vt Set: 480 mL  PEEP/CPAP: 5 cmH20  Pressure Support: 5 cmH20    Plan:  bipap QS    GI/:     Diet/Nutrition Received: mechanical/dental soft  Last Bowel Movement: 12/26/23  Voiding Characteristics: external catheter    Intake/Output Summary (Last 24 hours) at 12/28/2023 0051  Last data filed at 12/27/2023 1900  Gross per 24 hour   Intake 2840.07 ml   Output 1500 ml   Net 1340.07 ml     Unmeasured Output  Urine Occurrence: 4  Stool Occurrence: 1    Labs/Accuchecks:  Recent Labs   Lab 12/27/23  0703   WBC 16.65*   RBC 2.85*   HGB 8.5*   HCT 25.6*         Recent Labs   Lab 12/27/23  0703      K 3.4*   CO2 32*      BUN 42*   CREATININE 0.9   ALKPHOS 212*   *   *   BILITOT 0.6      Recent Labs   Lab 12/27/23  0703   INR 1.1      Recent Labs   Lab 12/22/23  1205   CPK 36       Electrolytes: No replacement orders  Accuchecks: ACHS    Gtts:   sodium chloride 0.9% 50 mL/hr at 12/27/23 0409       LDA/Wounds:  Lines/Drains/Airways       Peripherally Inserted Central Catheter Line  Duration             PICC Double Lumen 12/19/23 1609 left brachial 8 days              Drain  Duration                  NG/OG Tube  12/14/23 0800 nasogastric Left nostril 13 days         Rectal Tube 12/26/23 1757 fecal management system 1 day    Female External Urinary Catheter w/ Suction 12/26/23 1756 1 day                  Wounds: Yes  Wound care consulted: Yes

## 2023-12-28 NOTE — PLAN OF CARE
[facility]     Department of Hospital Medicine     [address] [place] [address]     [phone] after hours  [phone] fax                                        FACILITY TRANSFER ORDERS     12/28/2023    Admit to:  [facility]    Diagnoses:  Active Hospital Problems    Diagnosis  POA    *Acute osteomyelitis of lumbar spine [M46.26]  Yes     Priority: 1 - High    Discitis [M46.40]  Yes     Priority: 1 - High    Psoas muscle abscess [K68.12]  Yes     Priority: 1 - High    Acute metabolic encephalopathy [G93.41]  Yes     Priority: 3     Acute blood loss anemia [D62]  No    Pain [R52]  Yes    Hypernatremia [E87.0]  Yes    Hypokalemia [E87.6]  No    Hematocrit precipitous drop [R71.0]  No    Pain of left hand [M79.642]  Clinically Undetermined    Thrombophlebitis [I80.9]  No    Atelectasis [J98.11]  No    Hypophosphatemia [E83.39]  No    Intertriginous dermatitis associated with moisture [L30.4]  Yes    HILARIO on CPAP [G47.33]  Yes    Hemiparesis [G81.90]  Yes    Acute on chronic systolic heart failure [I50.23]  Yes    Malnutrition [E46]  Yes     Noted that pt has had a -19% wt change since admit.       Back pain [M54.9]  Yes    Stercoral colitis [K52.89]  Yes    Leukocytosis [D72.829]  Yes    Urinary retention [R33.9]  Yes    Transaminitis [R74.01]  No    Debility [R53.81]  Yes    History of CVA (cerebrovascular accident) [Z86.73]  Not Applicable      Resolved Hospital Problems    Diagnosis Date Resolved POA    Septic encephalopathy [G93.41] 12/22/2023 Yes     Priority: 1 - High    Severe sepsis [A41.9, R65.20] 12/21/2023 Yes     Priority: 1 - High    Class 2 obesity with body mass index (BMI) of 39.0 to 39.9 in adult [E66.9, Z68.39] 12/21/2023 Not Applicable       Vital Signs: Routine.    Allergies:  Review of patient's allergies indicates:   Allergen Reactions    Tomato (solanum lycopersicum) Hives    Naproxen Hives    Shrimp Other (See Comments)       Diet: low salt  puree diet  Fluid restrict 1200 mL  Nutren 2.0 one can TID  Crush meds for oral intake    Acitivities: as tolerated  Restrictions: Avoid bending and twisting the area of your lumbar surgery more than 45 degrees from neutral position in any direction. Wear your brace when OOB working with therapy or sitting upright. Do not take ANY non-steroidal anti-inflammatory drugs (NSAIDS), including the following: ibuprofen, naprosyn, Aleve, Advil, Indocin, Mobic, or Celebrex for 12 weeks after surgery. No bandage required. Keep your incision open to the air. You cannot take a bath until 8 weeks after surgery.     Nursing: per routine  PEG care  NG tube care  CPAP 8 mmHg qhs    Labs: per routine  Accuchecks not required    Consults: PT/OT/SLP  Nutritionist consult to transition off tube feeds    ID if available on 1/26/2023    Wound care     Lumbar wound - Keep the incision clean and dry at all times. Do not allow the force of water to hit the incision. If the incision gets damp, pat it dry. Do not rub or scrub the incision.     gluteal cleft BID/PRN - - cleanse gently w/ soap and water or no rinse baby wipes, do not scrub - residual Triad is OK. Pat dry and apply Triad to affected area and leave АЛЕКСАНДР. NO FOAM DRESSINGS w/ Triad use.    penis - Daily - cleanse area gently w/ soap and water, allow to dry and apply Cavilon barrier spray to affected area.    Staple removal from back 1/2/2024    Medications:   Current Discharge Medication List        START taking these medications    Details   colchicine (COLCRYS) 0.6 mg tablet Take 1 tablet (0.6 mg total) by mouth every Mon, Wed, Fri.  Qty: 12 tablet, Refills: 11      dextrose 5 % (D5W) SolP 250 mL with vancomycin 1.5 gram SolR 1,500 mg Inject 1,500 mg into the vein once daily. Stop 2/9/24. ID evaluation 1/26/2023 to stop earlier.      Lactobacillus rhamnosus GG (CULTURELLE) 10 billion cell capsule 1 capsule by Per G Tube route once daily.  Qty: 60 capsule, Refills: 3      losartan  (COZAAR) 25 MG tablet Take 1 tablet (25 mg total) by mouth once daily.  Qty: 90 tablet, Refills: 3    Comments: .      magnesium oxide (MAG-OX) 400 mg (241.3 mg magnesium) tablet Take 1 tablet (400 mg total) by mouth 3 (three) times daily. Stop 1/27/2023  Qty: 90 tablet, Refills: 0      potassium bicarbonate (K-LYTE) disintegrating tablet 1 tablet (25 mEq total) by Per G Tube route once daily. Stop 1/4/2024. May take orally if NG tube not available      potassium, sodium phosphates (PHOS-NAK) 280-160-250 mg PwPk 2 packets by Per NG tube route daily with breakfast. Stop 1/4/2024. May take orally if NG tube is not available for 7 doses  Refills: 0      psyllium husk, aspartame, (METAMUCIL) 3.4 gram PwPk packet 1 packet by Per NG tube route 2 (two) times daily.      sodium chloride 0.9% SolP 100 mL with meropenem 1 gram SolR 2 g Inject 2 g into the vein every 8 (eight) hours. Stop 2/9/24. ID evaluation 1/26/2023 to stop earlier.      vancomycin 125 mg/5 mL Soln Take 5 mLs (125 mg total) by mouth every 6 (six) hours. Stop 1/6/2024 for 14 days           CONTINUE these medications which have CHANGED    Details   furosemide (LASIX) 80 MG tablet Take 1 tablet (80 mg total) by mouth once daily.  Qty: 60 tablet, Refills: 0    Associated Diagnoses: Bilateral lower extremity edema           CONTINUE these medications which have NOT CHANGED    Details   atorvastatin (LIPITOR) 40 MG tablet Take 1 tablet (40 mg total) by mouth once daily.  Qty: 90 tablet, Refills: 1    Associated Diagnoses: Hyperlipidemia, acquired      fluticasone-salmeterol diskus inhaler 500-50 mcg Inhale 1 puff into the lungs 2 (two) times daily. Controller  Qty: 60 each, Refills: 11    Associated Diagnoses: COPD, moderate      tamsulosin (FLOMAX) 0.4 mg Cap Take 0.4 mg by mouth once daily.      aspirin (ECOTRIN) 81 MG EC tablet Take 81 mg by mouth once daily.      blood sugar diagnostic Strp To check BG 2 times daily, to use with insurance preferred  meter  Qty: 200 strip, Refills: 3    Associated Diagnoses: Type 2 diabetes mellitus with stage 3 chronic kidney disease, without long-term current use of insulin      hydroCHLOROthiazide (HYDRODIURIL) 25 MG tablet Take 25 mg by mouth once daily.      lancets Misc To check BG 2 times daily, to use with insurance preferred meter  Qty: 200 each, Refills: 3    Associated Diagnoses: Type 2 diabetes mellitus with stage 3 chronic kidney disease, without long-term current use of insulin           STOP taking these medications       diltiaZEM (CARDIZEM CD) 240 MG 24 hr capsule Comments:   Reason for Stopping:         gabapentin (NEURONTIN) 600 MG tablet Comments:   Reason for Stopping:         HYDROcodone-acetaminophen (NORCO) 5-325 mg per tablet Comments:   Reason for Stopping:         blood pressure test kit-large Kit Comments:   Reason for Stopping:         blood-glucose meter kit Comments:   Reason for Stopping:         ondansetron (ZOFRAN-ODT) 4 MG TbDL Comments:   Reason for Stopping:             Nystatin 100,000 unit/mL oral suspension 6 mL po QID x 10 days    Patient will need to see ID around 1/26/2023 to determine definitive stop date for IV antibiotics      If no ID consult available in house - please set up visit with ID at [facility] [address] around 1/26/2023    _________________________________  April Mancuso MD  12/28/2023

## 2023-12-28 NOTE — PT/OT/SLP PROGRESS
Occupational Therapy   Treatment    Name: Bj Pate Jr.  MRN: [id number]  Admitting Diagnosis:  Acute osteomyelitis of lumbar spine  13 Days Post-Op  Procedure(s):  L-1 TO L-4 FUSION, SPINE, LUMBAR, TLIF, POSTERIOR APPROACH, USING PEDICLE SCREW     Recommendations:     Discharge Recommendations: Moderate Intensity Therapy  Discharge Equipment Recommendations:  lift device, hospital bed, wheelchair  Barriers to discharge:   (increased skilled assistance for ADLs and mobility)    Assessment:     Bj Pate Jr. is a 73 y.o. male with a medical diagnosis of Acute osteomyelitis of lumbar spine.  He presents with the following performance deficits affecting function are weakness, impaired endurance, impaired self care skills, impaired functional mobility, gait instability, decreased lower extremity function, decreased upper extremity function, decreased coordination, decreased ROM, impaired joint extensibility, impaired muscle length, impaired coordination, impaired fine motor, abnormal tone, pain, decreased safety awareness, impaired skin, edema, impaired cardiopulmonary response to activity, impaired cognition, impaired balance, orthopedic precautions. Patient continues to require 2 person (A) for all mobility today. However, improved alertness once EOB. Patient continues to demonstrate the need for moderate intensity therapy on a daily basis post acute exhibited by decreased independence with self-care and functional mobility    Rehab Prognosis:  Fair; patient would benefit from acute skilled OT services to address these deficits and reach maximum level of function.       Plan:     Patient to be seen 3 x/week to address the above listed problems via self-care/home management, therapeutic activities, neuromuscular re-education, therapeutic exercises  Plan of Care Expires: 01/19/24  Plan of Care Reviewed with: patient    Subjective     Chief Complaint: c/o pain   Patient/Family Comments/goals: patient unable to  express; patient A&O x1 today  Pain/Comfort:  Pain Rating 1:  (unrated)  Location - Side 1: Bilateral  Location - Orientation 1: generalized  Location 1: arm  Pain Addressed 1: Reposition, Cessation of Activity, Nurse notified  Pain Rating Post-Intervention 1:  (no indication of pain at rest)    Objective:     Communicated with: nurse ramírez prior to session.  Patient found HOB elevated with NG tube, bowel management system, telemetry, PureWick upon OT entry to room.    General Precautions: Standard, aspiration, fall    Orthopedic Precautions:spinal precautions  Braces: LSO  Respiratory Status: Room air     Occupational Performance:     Bed Mobility:    Patient completed Scooting/Bridging with total assistance  Patient completed Supine to Sit with dependent and 2 persons  Patient completed Sit to Supine with total assistance and 2 persons   Rolling L<>R x5 trials with (D)x2    Functional Mobility/Transfers:  Functional Mobility: Patient sat EOB ~10 min with Total(A) for static sitting balance; mild SOB noted; severe posterior lean noted    Activities of Daily Living:  Upper Body Dressing: dependence seated EOB to don clean gown  Lower Body Dressing: dependence don B socks in supine  Toileting: dependence patient received soiled despite BM system in place requiring complete hygiene and linen change with nurse (A)  Donned LSO with (D)x2 in log roll prior to OOB activity      [facility] 6 Click ADL: 6    Treatment & Education:  Positioning techniques to maintain skin/joint integrity as well as edema management for B UE. Addressed all patient needs within HOUSE scope of practice. Rehab tech present to support HOUSE.    Patient left HOB elevated with all lines intact, call button in reach, bed alarm on, and nurse and friend present    GOALS:   Multidisciplinary Problems       Occupational Therapy Goals          Problem: Occupational Therapy    Goal Priority Disciplines Outcome Interventions   Occupational Therapy Goal     OT,  PT/OT Ongoing, Progressing    Description: Goals to be met by: 1/19/24     Patient will increase functional independence with ADLs by performing:    UE Dressing with Moderate Assistance.  LE Dressing with Maximum Assistance.  Grooming while seated with Moderate Assistance.  Toileting from bedside commode with Maximum Assistance for hygiene and clothing management.   Step transfer with Moderate Assistance  Toilet transfer to bedside commode with Moderate Assistance.                         Time Tracking:     OT Date of Treatment: 12/28/23  OT Start Time: 1130  OT Stop Time: 1214  OT Total Time (min): 44 min    Billable Minutes:Self Care/Home Management 30  Therapeutic Activity 14    OT/АЛЕКСАНДР: АЛЕКСАНДР     Number of АЛЕКСАНДР visits since last OT visit: 2    12/28/2023

## 2023-12-28 NOTE — PROGRESS NOTES
Connor Mina - Neurosurgery ([facility])  Neurosurgery  Progress Note    Subjective:     History of Present Illness: Mr. Pate is a 73M w/ hx CVA, CKD3, HTN, DM2, COPD who presents with increasing lethargy and confusion. Per ED team, patient had 3 weeks of low back pain, with a fall yesterday leading to his presentation. He had subjective lower extremity weakness and urinary retention after the fall. Septic on presentation to ED. Too altered to meaningfully participate in spine motor exam at time of NSGY evaluation.     Post-Op Info:  Procedure(s) (LRB):  L-1 TO L-4 FUSION, SPINE, LUMBAR, TLIF, POSTERIOR APPROACH, USING PEDICLE SCREW (N/A)   13 Days Post-Op   Interval History: NAEON. AFVSS. Awake, oriented to self. Pain to palpation of left hand. Incision c/d/I. Exam stable.       Medications:  Continuous Infusions:   sodium chloride 0.9% 50 mL/hr at 12/27/23 0409     Scheduled Meds:   colchicine  0.6 mg Oral Daily    enoxparin  40 mg Subcutaneous Q24H (prophylaxis, 1700)    fluticasone furoate-vilanteroL  1 puff Inhalation Daily    furosemide (LASIX) injection  80 mg Intravenous Q12H    losartan  25 mg Oral Daily    meropenem (MERREM) IVPB  2 g Intravenous Q8H    modafiniL  200 mg Per NG tube Before breakfast    psyllium husk (aspartame)  1 packet Per NG tube BID    sodium chloride 0.9%  10 mL Intravenous Q6H    vancomycin (VANCOCIN) IV (PEDS and ADULTS)  1,500 mg Intravenous Q24H    vancomycin  125 mg Oral Q6H     PRN Meds:0.9%  NaCl infusion (for blood administration), 0.9%  NaCl infusion (for blood administration), 0.9%  NaCl infusion (for blood administration), dextrose 10%, dextrose 10%, glucagon (human recombinant), insulin aspart U-100, ondansetron, Flushing PICC/Midline Protocol **AND** sodium chloride 0.9% **AND** sodium chloride 0.9%, Pharmacy to dose Vancomycin consult **AND** vancomycin - pharmacy to dose     Review of Systems  Objective:     Weight: 119.7 kg (263 lb 14.3 oz)  Body mass index is 38.97  kg/m².  Vital Signs (Most Recent):  Temp: 97.6 °F (36.4 °C) (12/28/23 0800)  Pulse: 99 (12/28/23 0800)  Resp: 18 (12/28/23 0800)  BP: 121/77 (12/28/23 0800)  SpO2: 98 % (12/28/23 0109) Vital Signs (24h Range):  Temp:  [97.6 °F (36.4 °C)-99.3 °F (37.4 °C)] 97.6 °F (36.4 °C)  Pulse:  [] 99  Resp:  [16-20] 18  SpO2:  [96 %-98 %] 98 %  BP: (104-121)/(55-77) 121/77                  Oxygen Concentration (%):  [21] 21             NG/OG Tube 12/14/23 0800 nasogastric Left nostril (Active)   Placement Check placement verified by x-ray 12/23/23 0600   Tolerance no signs/symptoms of discomfort 12/23/23 0600   Securement secured to nostril center w/ adhesive device 12/22/23 0750   Clamp Status/Tolerance clamped 12/23/23 0600   Suction Setting/Drainage Method suction at the bedside 12/23/23 0600   Insertion Site Appearance no redness, warmth, tenderness, skin breakdown, drainage 12/23/23 0600   Drainage None 12/23/23 0600   Flush/Irrigation flushed w/;water 12/23/23 0600   Feeding Type continuous 12/22/23 0750   Feeding Action feeding held 12/22/23 0920   Current Rate (mL/hr) 50 mL/hr 12/22/23 0750   Goal Rate (mL/hr) 50 mL/hr 12/22/23 0750   Intake (mL) 200 mL 12/22/23 2100   Water Bolus (mL) 250 mL 12/22/23 0920   Tube Output(mL)(Include Discarded Residual) 0 mL 12/15/23 0006   Formula Name impact peptide 1.5 12/22/23 0750   Intake (mL) - Formula Tube Feeding 50 12/22/23 0500       Male External Urinary Catheter 12/21/23 1015 Small (Active)   Collection Container Standard drainage bag 12/22/23 0750   Skin no breakdown;no redness 12/23/23 0600   Tolerance no signs/symptoms of discomfort 12/23/23 0600   Output (mL) 360 mL 12/23/23 0926   Catheter Change Date 12/23/23 12/23/23 1100   Catheter Change Time 1100 12/23/23 1100            Fecal Incontinence  12/20/23 1332 (Active)   $ Fecal Management Supplies Fecal Management System (Supply) 12/21/23 1525   Application fecal incontinence  changed 12/21/23 1522    Drainage Method attached to drainage bag 12/23/23 1100   Securement to gravity 12/20/23 2000   Skin breakdown;reddened;cleansed, skin barrier applied 12/23/23 1100   Tolerance no signs/symptoms of discomfort 12/23/23 1100   Stool (mL) 100 mL 12/23/23 0926     Physical Exam  General: well developed, well nourished, no distress.   Head: normocephalic, atraumatic  Mental Status: Awake, Alert, Oriented to self  Speech: no dysarthria, somewhat appropriate responses   Cranial nerves: CN II-XII grossly intact.   Sensory: intact to light touch throughout  Motor Strength: BUE 2/5 throughout, edema improving in RUE, painful to touch. BLE 2/5 proximally and able to abduct/adduct L>R, 3/5 distally. No abnormal movements seen.     Clonus: absent       Significant Labs:  Recent Labs   Lab 12/27/23  0703 12/28/23  0107   * 132*    142   K 3.4* 3.1*    105   CO2 32* 31*   BUN 42* 47*   CREATININE 0.9 1.0   CALCIUM 8.6* 8.1*   MG 1.7 1.6       Recent Labs   Lab 12/27/23  0703 12/28/23  0107   WBC 16.65* 15.66*   HGB 8.5* 7.7*   HCT 25.6* 22.6*    334       Recent Labs   Lab 12/27/23  0703   INR 1.1       Microbiology Results (last 7 days)       Procedure Component Value Units Date/Time    Fungus culture [[id number]] Collected: 12/13/23 1703    Order Status: Completed Specimen: Abscess from Buttocks, Left Updated: 12/27/23 0841     Fungus (Mycology) Culture Culture in progress      No fungus isolated after 2 weeks    Culture, Anaerobe [[id number]] Collected: 12/15/23 1657    Order Status: Completed Specimen: Wound from Back Updated: 12/22/23 0739     Anaerobic Culture No anaerobes isolated    Narrative:      3.) 2,3 Disc space    Culture, Anaerobe [[id number]] Collected: 12/15/23 1603    Order Status: Completed Specimen: Wound from Back Updated: 12/22/23 0739     Anaerobic Culture No anaerobes isolated    Narrative:      Paraspinal    Culture, Anaerobe [[id number]] Collected: 12/15/23 1655    Order Status:  Completed Specimen: Wound from Back Updated: 12/22/23 0739     Anaerobic Culture No anaerobes isolated    Narrative:      2.) 2,3 Disc space          All pertinent labs from the last 24 hours have been reviewed.    Significant Diagnostics:  No results found in the last 24 hours.    Assessment/Plan:     * Acute osteomyelitis of lumbar spine  A 73M w/ hx CVA, CKD3, HTN, DM2, and COPD who presents with AMS likely 2/2 underlying sepsis as well as progressive back pain and inability to ambulate for the past 2 weeks due to progressive L2-L3 osteodiscitis and presumed mechanical instability.     CTH 12/11: lacunar infarcts   MRI w/wo L sp 12/11: severe central stenosis L4-5, discitis osteomyelitis at L2-3, L psoas abscess up to 2 cm  MRI C/T w/wo: very poor with alot of motion artifact, but no OM else where   US BLE 12/12: negative  12/10: , CRP >120  BCx NGTD  MRI brain, Csp, CTH 12/16: nondiagnostic  MRI C/T/L w/wo 12/16 without contribution to picture of fluctuating sensorium and motor exam; severe spondylosis in Csp with some STIR and contrast enhancement prevertebral and possibly early osteodiscitis. MRI Tsp without concerning findings and MRI Lsp with expected postop changes.  MRI C/T/L w/wo 12/25: limited 2/2 motion artifact.     Now s/p L1-L4 decompression, L2-L3 TLIF and L1-L4 PSF on 12/15    - Encephalopathy is improving.   - Continue multimodal pain medication   - Turn every 2 hours to promote wound healing. Keep area clean and dry.   - HV and WV removed 12/20   - LSO brace when OOB  - Intraoperative cultures NGTD, continue abx per ID  - DVT ppx. Ok to resume anticoagulation or antiplatelet therapy on 12/29.   - PT/OT and OOB   - Obtain post-op XR lumbar spine. Ok to obtain supine.   - Patient does not need repeat MRI spine at this time. Continue Abx per ID for treatment of osteomyelitis. Will repeat MRI cervical spine outpatient to address possible cervical spondylosis.  - Rest of care per primary   -  Please notify neurosurgery on call for any acute neurologic change.  - Post op Patient Instructions: Avoid bending and twisting the area of your surgery more than 45 degrees from neutral position in any direction. Wear your brace when OOB working with therapy or sitting upright. Do not take ANY non-steroidal anti-inflammatory drugs (NSAIDS), including the following: ibuprofen, naprosyn, Aleve, Advil, Indocin, Mobic, or Celebrex for 12 weeks after surgery. No bandage required. Keep your incision open to the air. Remove staples/sutures 1/2. You may shower on the 2nd day after your surgery. Keep the incision clean and dry at all times. Do not allow the force of water to hit the incision. If the incision gets damp, pat it dry. Do not rub or scrub the incision. You cannot take a bath until 8 weeks after surgery.                Fern Gomez PA-C  Neurosurgery  Connor Mina - Neurosurgery ([facility])

## 2023-12-28 NOTE — PT/OT/SLP PROGRESS
"Speech Language Pathology Treatment    Patient Name:  Bj Pate Jr.   MRN:  [id number]  Admitting Diagnosis: Acute osteomyelitis of lumbar spine    Recommendations:                 General Recommendations:  Dysphagia therapy and Cognitive-linguistic evaluation  Diet recommendations:  Puree, Liquid Diet Level: Thin (or full liquids per Patient's preference)   Aspiration Precautions: 1 bite/sip at a time, Assistance with meals, Eliminate distractions, upright with all PO, Feed only when awake/alert, Frequent oral care, Meds crushed in puree or via alternative means, Small bites/sips, and Strict aspiration precautions. Continue to monitor for signs and symptoms of aspiration and discontinue oral feeding should you notice any of the following: watery eyes, reddened facial area, wet vocal quality, increased work of breathing, change in respiratory status, increased congestion, coughing, fever and/or change in level of alertness  General Precautions: Standard, aspiration, fall  Communication strategies:  provide increased time to answer and go to room if call light pushed    Assessment:     Bj Pate Jr. is a 73 y.o. male with an SLP diagnosis of Dysphagia.  He presents with minimal PO acceptance.  ST to continue to follow for ongoing swallow assessment and further assessment of speech, language and cognition.      Subjective     SLP reviewed Patient with RN  Pt presents alert  He explains, "No pears."   Family at the bedside explains, "He'll drink the Boost"     Pain/Comfort:  Pain Rating 1: other (see comments) (did not rate)    Respiratory Status: Room air    Objective:     Has the patient been evaluated by SLP for swallowing?   Yes  Keep patient NPO? No   Current Respiratory Status:  Room air       Patient found awake and upright with NG in place and RN and Family at the bedside.  He was oriented to name only. He demonstrated word-finding difficulty t/o spontaneous and conversational speech tasks as c/b delayed " initiation and hesitations. Family at the bedside observed feeding Pt sips of Boost from lunch meal tray x3. No overt S/S aspiration with sips thin liquids observed. Patient declined additional presentations of thin liquids or bites puree from lunch meal tray. He politely declined trials of solids. RN exited room as session continued. Family at the bedside explained he ate 3-4 bites from tray.  Pt and family were educated on dysphagia diets, aspiration precautions, and SLP POC including plan to re attempt with semi-soft textures as feasible and further assessment of cognitive-linguistic skills as feasible pending PO acceptance and alertness. Pt did not demonstrate understanding. No additional questions. Pt remained upright in bed with family in the room. MD notified of findings and recommendations.     Goals:   Multidisciplinary Problems       SLP Goals          Problem: SLP    Goal Priority Disciplines Outcome   SLP Goal     SLP Ongoing, Progressing   Description: Speech Language Pathology Goals  Goals expected to be met by 12/25/23    1. Pt will tolerate thin liquids w/o overt S/S aspiration, MIN A  2. Pt will participate in ongoing assessment of swallow function to determine safest, least restrictive means of nutrition/hydration  3. Pt will participate in further assessment of cognitive-linguistic skills to determine ongoing POC needs  4. Educate Pt and family on aspiration precautions and SLP POC                         Plan:     Patient to be seen:  4 x/week   Plan of Care expires:  01/17/24  Plan of Care reviewed with:  patient, friend   SLP Follow-Up:  Yes       Discharge recommendations:  Moderate Intensity Therapy     Time Tracking:     SLP Treatment Date:   12/28/23  Speech Start Time:  1229  Speech Stop Time:  1249     Speech Total Time (min):  20 min    Billable Minutes: Treatment Swallowing Dysfunction 8 and Self Care/Home Management Training 8    12/28/2023

## 2023-12-28 NOTE — SUBJECTIVE & OBJECTIVE
Interval History: NAEON. AFVSS. Awake, oriented to self. Pain to palpation of left hand. Incision c/d/I. Exam stable.       Medications:  Continuous Infusions:   sodium chloride 0.9% 50 mL/hr at 12/27/23 0409     Scheduled Meds:   colchicine  0.6 mg Oral Daily    enoxparin  40 mg Subcutaneous Q24H (prophylaxis, 1700)    fluticasone furoate-vilanteroL  1 puff Inhalation Daily    furosemide (LASIX) injection  80 mg Intravenous Q12H    losartan  25 mg Oral Daily    meropenem (MERREM) IVPB  2 g Intravenous Q8H    modafiniL  200 mg Per NG tube Before breakfast    psyllium husk (aspartame)  1 packet Per NG tube BID    sodium chloride 0.9%  10 mL Intravenous Q6H    vancomycin (VANCOCIN) IV (PEDS and ADULTS)  1,500 mg Intravenous Q24H    vancomycin  125 mg Oral Q6H     PRN Meds:0.9%  NaCl infusion (for blood administration), 0.9%  NaCl infusion (for blood administration), 0.9%  NaCl infusion (for blood administration), dextrose 10%, dextrose 10%, glucagon (human recombinant), insulin aspart U-100, ondansetron, Flushing PICC/Midline Protocol **AND** sodium chloride 0.9% **AND** sodium chloride 0.9%, Pharmacy to dose Vancomycin consult **AND** vancomycin - pharmacy to dose     Review of Systems  Objective:     Weight: 119.7 kg (263 lb 14.3 oz)  Body mass index is 38.97 kg/m².  Vital Signs (Most Recent):  Temp: 97.6 °F (36.4 °C) (12/28/23 0800)  Pulse: 99 (12/28/23 0800)  Resp: 18 (12/28/23 0800)  BP: 121/77 (12/28/23 0800)  SpO2: 98 % (12/28/23 0109) Vital Signs (24h Range):  Temp:  [97.6 °F (36.4 °C)-99.3 °F (37.4 °C)] 97.6 °F (36.4 °C)  Pulse:  [] 99  Resp:  [16-20] 18  SpO2:  [96 %-98 %] 98 %  BP: (104-121)/(55-77) 121/77                  Oxygen Concentration (%):  [21] 21             NG/OG Tube 12/14/23 0800 nasogastric Left nostril (Active)   Placement Check placement verified by x-ray 12/23/23 0600   Tolerance no signs/symptoms of discomfort 12/23/23 0600   Securement secured to nostril center w/ adhesive device  12/22/23 0750   Clamp Status/Tolerance clamped 12/23/23 0600   Suction Setting/Drainage Method suction at the bedside 12/23/23 0600   Insertion Site Appearance no redness, warmth, tenderness, skin breakdown, drainage 12/23/23 0600   Drainage None 12/23/23 0600   Flush/Irrigation flushed w/;water 12/23/23 0600   Feeding Type continuous 12/22/23 0750   Feeding Action feeding held 12/22/23 0920   Current Rate (mL/hr) 50 mL/hr 12/22/23 0750   Goal Rate (mL/hr) 50 mL/hr 12/22/23 0750   Intake (mL) 200 mL 12/22/23 2100   Water Bolus (mL) 250 mL 12/22/23 0920   Tube Output(mL)(Include Discarded Residual) 0 mL 12/15/23 0006   Formula Name impact peptide 1.5 12/22/23 0750   Intake (mL) - Formula Tube Feeding 50 12/22/23 0500       Male External Urinary Catheter 12/21/23 1015 Small (Active)   Collection Container Standard drainage bag 12/22/23 0750   Skin no breakdown;no redness 12/23/23 0600   Tolerance no signs/symptoms of discomfort 12/23/23 0600   Output (mL) 360 mL 12/23/23 0926   Catheter Change Date 12/23/23 12/23/23 1100   Catheter Change Time 1100 12/23/23 1100            Fecal Incontinence  12/20/23 1332 (Active)   $ Fecal Management Supplies Fecal Management System (Supply) 12/21/23 1525   Application fecal incontinence  changed 12/21/23 1525   Drainage Method attached to drainage bag 12/23/23 1100   Securement to gravity 12/20/23 2000   Skin breakdown;reddened;cleansed, skin barrier applied 12/23/23 1100   Tolerance no signs/symptoms of discomfort 12/23/23 1100   Stool (mL) 100 mL 12/23/23 0926     Physical Exam  General: well developed, well nourished, no distress.   Head: normocephalic, atraumatic  Mental Status: Awake, Alert, Oriented to self  Speech: no dysarthria, somewhat appropriate responses   Cranial nerves: CN II-XII grossly intact.   Sensory: intact to light touch throughout  Motor Strength: BUE 2/5 throughout, edema improving in RUE, painful to touch. BLE 2/5 proximally and able to  abduct/adduct L>R, 3/5 distally. No abnormal movements seen.     Clonus: absent       Significant Labs:  Recent Labs   Lab 12/27/23  0703 12/28/23  0107   * 132*    142   K 3.4* 3.1*    105   CO2 32* 31*   BUN 42* 47*   CREATININE 0.9 1.0   CALCIUM 8.6* 8.1*   MG 1.7 1.6       Recent Labs   Lab 12/27/23  0703 12/28/23  0107   WBC 16.65* 15.66*   HGB 8.5* 7.7*   HCT 25.6* 22.6*    334       Recent Labs   Lab 12/27/23  0703   INR 1.1       Microbiology Results (last 7 days)       Procedure Component Value Units Date/Time    Fungus culture [[id number]] Collected: 12/13/23 1703    Order Status: Completed Specimen: Abscess from Buttocks, Left Updated: 12/27/23 0841     Fungus (Mycology) Culture Culture in progress      No fungus isolated after 2 weeks    Culture, Anaerobe [[id number]] Collected: 12/15/23 1657    Order Status: Completed Specimen: Wound from Back Updated: 12/22/23 0739     Anaerobic Culture No anaerobes isolated    Narrative:      3.) 2,3 Disc space    Culture, Anaerobe [[id number]] Collected: 12/15/23 1603    Order Status: Completed Specimen: Wound from Back Updated: 12/22/23 0739     Anaerobic Culture No anaerobes isolated    Narrative:      Paraspinal    Culture, Anaerobe [[id number]] Collected: 12/15/23 1655    Order Status: Completed Specimen: Wound from Back Updated: 12/22/23 0739     Anaerobic Culture No anaerobes isolated    Narrative:      2.) 2,3 Disc space          All pertinent labs from the last 24 hours have been reviewed.    Significant Diagnostics:  No results found in the last 24 hours.

## 2023-12-28 NOTE — ASSESSMENT & PLAN NOTE
A 73M w/ hx CVA, CKD3, HTN, DM2, and COPD who presents with AMS likely 2/2 underlying sepsis as well as progressive back pain and inability to ambulate for the past 2 weeks due to progressive L2-L3 osteodiscitis and presumed mechanical instability.     CTH 12/11: lacunar infarcts   MRI w/wo L sp 12/11: severe central stenosis L4-5, discitis osteomyelitis at L2-3, L psoas abscess up to 2 cm  MRI C/T w/wo: very poor with alot of motion artifact, but no OM else where   US BLE 12/12: negative  12/10: , CRP >120  BCx NGTD  MRI brain, Csp, CTH 12/16: nondiagnostic  MRI C/T/L w/wo 12/16 without contribution to picture of fluctuating sensorium and motor exam; severe spondylosis in Csp with some STIR and contrast enhancement prevertebral and possibly early osteodiscitis. MRI Tsp without concerning findings and MRI Lsp with expected postop changes.  MRI C/T/L w/wo 12/25: limited 2/2 motion artifact.     Now s/p L1-L4 decompression, L2-L3 TLIF and L1-L4 PSF on 12/15    - Encephalopathy is improving.   - Continue multimodal pain medication   - Turn every 2 hours to promote wound healing. Keep area clean and dry.   - HV and WV removed 12/20   - LSO brace when OOB  - Intraoperative cultures NGTD, continue abx per ID  - DVT ppx. Ok to resume anticoagulation or antiplatelet therapy on 12/29.   - PT/OT and OOB   - Obtain post-op XR lumbar spine. Ok to obtain supine.   - Patient does not need repeat MRI spine at this time. Continue Abx per ID for treatment of osteomyelitis. Will repeat MRI cervical spine outpatient to address possible cervical spondylosis.  - Rest of care per primary   - Please notify neurosurgery on call for any acute neurologic change.  - Post op Patient Instructions: Avoid bending and twisting the area of your surgery more than 45 degrees from neutral position in any direction. Wear your brace when OOB working with therapy or sitting upright. Do not take ANY non-steroidal anti-inflammatory drugs (NSAIDS),  including the following: ibuprofen, naprosyn, Aleve, Advil, Indocin, Mobic, or Celebrex for 12 weeks after surgery. No bandage required. Keep your incision open to the air. Remove staples/sutures 1/2. You may shower on the 2nd day after your surgery. Keep the incision clean and dry at all times. Do not allow the force of water to hit the incision. If the incision gets damp, pat it dry. Do not rub or scrub the incision. You cannot take a bath until 8 weeks after surgery.

## 2023-12-28 NOTE — PROGRESS NOTES
Pharmacokinetic Assessment Follow Up: IV Vancomycin    Vancomycin Regimen Assessment & Plan:  - Vancomycin trough level (22.5-hour level) resulted at 22.1 mcg/mL, which is considered supratherapeutic (goal: 15-20 mcg/mL)  - Stable serum creatinine  - Change to vancomycin 1250 mg IV every 24 hours; start new regimen tomorrow at 0600 to allow for drug clearance  - Next vancomycin level scheduled prior to the third dose on 12/31 at 0500 or sooner if change in renal function    Drug levels (last 3 results):  Recent Labs   Lab Result Units 12/26/23  1343 12/28/23  1500   Vancomycin-Trough ug/mL 12.0 22.1*       Pharmacy will continue to follow and monitor vancomycin.    Please contact pharmacy at extension [phone] for questions regarding this assessment.    Thank you for the consult,   Neelam Burnette       Patient brief summary:  Bj Ptae Jr. is a 73 y.o. male initiated on antimicrobial therapy with IV Vancomycin for treatment of bone/joint infection      Drug Allergies:   Review of patient's allergies indicates:   Allergen Reactions    Tomato (solanum lycopersicum) Hives    Naproxen Hives    Shrimp Other (See Comments)       Actual Body Weight:   119.7 kg    Renal Function:   Estimated Creatinine Clearance: 86.5 mL/min (based on SCr of 1 mg/dL).     Dialysis Method (if applicable):  N/A    CBC (last 72 hours):  Recent Labs   Lab Result Units 12/26/23  0350 12/26/23  0855 12/27/23  0703 12/28/23  0107   WBC K/uL 18.29* 17.06* 16.65* 15.66*   Hemoglobin g/dL 6.7* 7.0* 8.5* 7.7*   Hematocrit % 20.8* 22.0* 25.6* 22.6*   Platelets K/uL 423 410 409 334   Gran % % 77.3* 77.3* 78.7* 77.7*   Lymph % % 7.2* 7.4* 8.1* 9.1*   Mono % % 10.1 9.3 7.7 7.8   Eosinophil % % 1.2 1.6 2.2 2.7   Basophil % % 0.2 0.2 0.2 0.1   Differential Method  Automated Automated Automated Automated       Metabolic Panel (last 72 hours):  Recent Labs   Lab Result Units 12/26/23  0350 12/26/23  0855 12/27/23  0703 12/28/23  0107   Sodium mmol/L 148* 147* 145  142   Potassium mmol/L 3.3* 3.2* 3.4* 3.1*   Chloride mmol/L 109 109 105 105   CO2 mmol/L 29 30* 32* 31*   Glucose mg/dL 99 106 139* 132*   BUN mg/dL 44* 44* 42* 47*   Creatinine mg/dL 0.8 0.9 0.9 1.0   Albumin g/dL 1.0* 1.0* 1.1* 1.1*   Total Bilirubin mg/dL 0.7 0.7 0.6 0.5   Alkaline Phosphatase U/L 211* 203* 212* 202*   AST U/L 354* 319* 349* 210*   ALT U/L 233* 238* 251* 195*   Magnesium mg/dL 1.7  --  1.7 1.6   Phosphorus mg/dL 1.0*  --  2.8 2.6*       Vancomycin Administrations:  vancomycin given in the last 96 hours                     vancomycin 125 mg/5 mL oral solution 125 mg (mg) 125 mg Given 12/28/23 1227     125 mg Given  0555     125 mg Given  0053     125 mg Given 12/27/23 1800     125 mg Given by Other  1212     125 mg Given  0531     125 mg Given 12/26/23 2353     125 mg Given  1805     125 mg Given  1220     125 mg Given  0615     125 mg Given  0118     125 mg Given 12/25/23 1749     125 mg Given  1144     125 mg Given  0545     125 mg Given  0119     125 mg Given 12/24/23 1749    vancomycin 1,500 mg in dextrose 5 % (D5W) 250 mL IVPB (Vial-Mate) (mg) 1,500 mg New Bag 12/27/23 1635     1,500 mg New Bag 12/26/23 1642    vancomycin (VANCOCIN) 1,000 mg in dextrose 5 % (D5W) 250 mL IVPB (Vial-Mate) (mg) 1,000 mg New Bag 12/25/23 1622                    Microbiologic Results:  Microbiology Results (last 7 days)       Procedure Component Value Units Date/Time    Fungus culture [[id number]] Collected: 12/13/23 1703    Order Status: Completed Specimen: Abscess from Buttocks, Left Updated: 12/27/23 0841     Fungus (Mycology) Culture Culture in progress      No fungus isolated after 2 weeks    Culture, Anaerobe [[id number]] Collected: 12/15/23 1657    Order Status: Completed Specimen: Wound from Back Updated: 12/22/23 0739     Anaerobic Culture No anaerobes isolated    Narrative:      3.) 2,3 Disc space    Culture, Anaerobe [[id number]] Collected: 12/15/23 1603    Order Status: Completed Specimen: Wound from  Back Updated: 12/22/23 0739     Anaerobic Culture No anaerobes isolated    Narrative:      Paraspinal    Culture, Anaerobe [[id number]] Collected: 12/15/23 6365    Order Status: Completed Specimen: Wound from Back Updated: 12/22/23 0739     Anaerobic Culture No anaerobes isolated    Narrative:      2.) 2,3 Disc space

## 2023-12-29 VITALS
SYSTOLIC BLOOD PRESSURE: 105 MMHG | WEIGHT: 263.88 LBS | RESPIRATION RATE: 18 BRPM | DIASTOLIC BLOOD PRESSURE: 62 MMHG | OXYGEN SATURATION: 100 % | HEIGHT: 69 IN | HEART RATE: 99 BPM | BODY MASS INDEX: 39.08 KG/M2 | TEMPERATURE: 97 F

## 2023-12-29 LAB
A1AT PHENOTYP SERPL-IMP: ABNORMAL BANDS
A1AT SERPL NEPH-MCNC: 215 MG/DL (ref 100–190)
ALBUMIN SERPL BCP-MCNC: 1.2 G/DL (ref 3.5–5.2)
ALP SERPL-CCNC: 201 U/L (ref 55–135)
ALT SERPL W/O P-5'-P-CCNC: 159 U/L (ref 10–44)
ANION GAP SERPL CALC-SCNC: 8 MMOL/L (ref 8–16)
AST SERPL-CCNC: 134 U/L (ref 10–40)
BASOPHILS # BLD AUTO: 0.04 K/UL (ref 0–0.2)
BASOPHILS NFR BLD: 0.3 % (ref 0–1.9)
BILIRUB SERPL-MCNC: 0.5 MG/DL (ref 0.1–1)
BUN SERPL-MCNC: 54 MG/DL (ref 8–23)
CALCIUM SERPL-MCNC: 8.7 MG/DL (ref 8.7–10.5)
CHLORIDE SERPL-SCNC: 98 MMOL/L (ref 95–110)
CO2 SERPL-SCNC: 36 MMOL/L (ref 23–29)
CREAT SERPL-MCNC: 0.9 MG/DL (ref 0.5–1.4)
DIFFERENTIAL METHOD BLD: ABNORMAL
EOSINOPHIL # BLD AUTO: 0.5 K/UL (ref 0–0.5)
EOSINOPHIL NFR BLD: 3.8 % (ref 0–8)
ERYTHROCYTE [DISTWIDTH] IN BLOOD BY AUTOMATED COUNT: 15.9 % (ref 11.5–14.5)
EST. GFR  (NO RACE VARIABLE): >60 ML/MIN/1.73 M^2
GLUCOSE SERPL-MCNC: 153 MG/DL (ref 70–110)
HCT VFR BLD AUTO: 26.6 % (ref 40–54)
HGB BLD-MCNC: 8.5 G/DL (ref 14–18)
IMM GRANULOCYTES # BLD AUTO: 0.2 K/UL (ref 0–0.04)
IMM GRANULOCYTES NFR BLD AUTO: 1.6 % (ref 0–0.5)
LYMPHOCYTES # BLD AUTO: 1.2 K/UL (ref 1–4.8)
LYMPHOCYTES NFR BLD: 9.7 % (ref 18–48)
MAGNESIUM SERPL-MCNC: 2.1 MG/DL (ref 1.6–2.6)
MCH RBC QN AUTO: 28.8 PG (ref 27–31)
MCHC RBC AUTO-ENTMCNC: 32 G/DL (ref 32–36)
MCV RBC AUTO: 90 FL (ref 82–98)
MONOCYTES # BLD AUTO: 0.8 K/UL (ref 0.3–1)
MONOCYTES NFR BLD: 6.6 % (ref 4–15)
NEUTROPHILS # BLD AUTO: 9.7 K/UL (ref 1.8–7.7)
NEUTROPHILS NFR BLD: 78 % (ref 38–73)
NRBC BLD-RTO: 0 /100 WBC
PHOSPHATE SERPL-MCNC: 3.1 MG/DL (ref 2.7–4.5)
PLATELET # BLD AUTO: 318 K/UL (ref 150–450)
PMV BLD AUTO: 10.1 FL (ref 9.2–12.9)
POCT GLUCOSE: 150 MG/DL (ref 70–110)
POCT GLUCOSE: 151 MG/DL (ref 70–110)
POTASSIUM SERPL-SCNC: 3.3 MMOL/L (ref 3.5–5.1)
PROT SERPL-MCNC: 6.6 G/DL (ref 6–8.4)
RBC # BLD AUTO: 2.95 M/UL (ref 4.6–6.2)
SODIUM SERPL-SCNC: 142 MMOL/L (ref 136–145)
WBC # BLD AUTO: 12.43 K/UL (ref 3.9–12.7)

## 2023-12-29 PROCEDURE — 25000003 PHARM REV CODE 250

## 2023-12-29 PROCEDURE — 25000003 PHARM REV CODE 250: Performed by: HOSPITALIST

## 2023-12-29 PROCEDURE — 97530 THERAPEUTIC ACTIVITIES: CPT | Mod: CO

## 2023-12-29 PROCEDURE — 25000003 PHARM REV CODE 250: Performed by: INTERNAL MEDICINE

## 2023-12-29 PROCEDURE — 25000242 PHARM REV CODE 250 ALT 637 W/ HCPCS: Performed by: PSYCHIATRY & NEUROLOGY

## 2023-12-29 PROCEDURE — 63600175 PHARM REV CODE 636 W HCPCS: Performed by: INTERNAL MEDICINE

## 2023-12-29 PROCEDURE — 25000003 PHARM REV CODE 250: Performed by: PSYCHIATRY & NEUROLOGY

## 2023-12-29 PROCEDURE — 83735 ASSAY OF MAGNESIUM: CPT

## 2023-12-29 PROCEDURE — A4216 STERILE WATER/SALINE, 10 ML: HCPCS | Performed by: PSYCHIATRY & NEUROLOGY

## 2023-12-29 PROCEDURE — 25000003 PHARM REV CODE 250: Performed by: REGISTERED NURSE

## 2023-12-29 PROCEDURE — 92526 ORAL FUNCTION THERAPY: CPT

## 2023-12-29 PROCEDURE — 80053 COMPREHEN METABOLIC PANEL: CPT

## 2023-12-29 PROCEDURE — 63600175 PHARM REV CODE 636 W HCPCS: Performed by: HOSPITALIST

## 2023-12-29 PROCEDURE — 84100 ASSAY OF PHOSPHORUS: CPT

## 2023-12-29 PROCEDURE — 85025 COMPLETE CBC W/AUTO DIFF WBC: CPT

## 2023-12-29 PROCEDURE — 63600175 PHARM REV CODE 636 W HCPCS

## 2023-12-29 PROCEDURE — 92523 SPEECH SOUND LANG COMPREHEN: CPT

## 2023-12-29 RX ORDER — FUROSEMIDE 40 MG/1
80 TABLET ORAL DAILY
Status: DISCONTINUED | OUTPATIENT
Start: 2023-12-29 | End: 2023-12-29 | Stop reason: HOSPADM

## 2023-12-29 RX ORDER — NYSTATIN 100000 [USP'U]/ML
6 SUSPENSION ORAL 4 TIMES DAILY
Qty: 240 ML | Refills: 0 | Status: SHIPPED | OUTPATIENT
Start: 2023-12-29 | End: 2024-01-08

## 2023-12-29 RX ORDER — SODIUM,POTASSIUM PHOSPHATES 280-250MG
2 POWDER IN PACKET (EA) ORAL ONCE
Status: DISCONTINUED | OUTPATIENT
Start: 2023-12-29 | End: 2023-12-29 | Stop reason: HOSPADM

## 2023-12-29 RX ADMIN — COLCHICINE 0.6 MG: 0.6 TABLET, FILM COATED ORAL at 09:12

## 2023-12-29 RX ADMIN — MEROPENEM 2 G: 1 INJECTION INTRAVENOUS at 10:12

## 2023-12-29 RX ADMIN — PSYLLIUM HUSK 1 PACKET: 3.4 POWDER ORAL at 09:12

## 2023-12-29 RX ADMIN — MODAFINIL 200 MG: 100 TABLET ORAL at 05:12

## 2023-12-29 RX ADMIN — LOSARTAN POTASSIUM 25 MG: 25 TABLET, FILM COATED ORAL at 09:12

## 2023-12-29 RX ADMIN — PSYLLIUM HUSK 1 PACKET: 3.4 POWDER ORAL at 12:12

## 2023-12-29 RX ADMIN — MEROPENEM 2 G: 1 INJECTION INTRAVENOUS at 02:12

## 2023-12-29 RX ADMIN — Medication 10 ML: at 12:12

## 2023-12-29 RX ADMIN — VANCOMYCIN HYDROCHLORIDE 1250 MG: 1.25 INJECTION, POWDER, LYOPHILIZED, FOR SOLUTION INTRAVENOUS at 12:12

## 2023-12-29 RX ADMIN — Medication 10 ML: at 06:12

## 2023-12-29 RX ADMIN — FUROSEMIDE 80 MG: 10 INJECTION, SOLUTION INTRAMUSCULAR; INTRAVENOUS at 12:12

## 2023-12-29 RX ADMIN — FUROSEMIDE 80 MG: 40 TABLET ORAL at 12:12

## 2023-12-29 RX ADMIN — Medication 30 MEQ: at 02:12

## 2023-12-29 NOTE — PLAN OF CARE
[facility] - Neurosurgery (Hospital)  Discharge Final Note    Primary Care Provider: Jose Antonio Foster MD    Expected Discharge Date: 12/29/2023    Final Discharge Note (most recent)       Final Note - 12/29/23 1351          Final Note    Assessment Type Final Discharge Note (P)      Anticipated Discharge Disposition Long Term Acute Care (P)         Post-Acute Status    Post-Acute Placement Status Set-up Complete/Auth obtained (P)      Discharge Delays Change in Medical Condition (P)                  Patient discharging to [facility].  SW provided RN with number for report and set up 3pm stretcher transport.  Patient and family aware.      Discharge Plan A and Plan B have been determined by review of patient's clinical status, future medical and therapeutic needs, and coverage/benefits for post-acute care in coordination with multidisciplinary team members.    Ana Maria Swift LMSW [facility]  [phone]        Future Appointments   Date Time Provider Department Center   1/11/2024 10:00 AM Jaida Rodriguez PA-C [facility] [place]   1/25/2024  2:15 PM [facility]   1/25/2024  3:00 PM Fern Gomez PA-C [id number]   1/26/2024  2:30 PM Kami Becker APRN, ANP [facility] ID [facility]         Important Message from Medicare

## 2023-12-29 NOTE — ASSESSMENT & PLAN NOTE
-Acute bump noted  -700s --> 300s --> 200s --> 500s; concern for -hypoperfusion of the liver per hepatology; maybe hepatic congestion  -Stable bilirubin; no N/V  -No obvious DILI per pharm review  -unremarkable Ammonia, hep viral panel, INR, CPK, GGT, US abd, CT abd findings  -Serological workup in process per hepatology consult,   -Was stable haptoglobin  and bilirubin hematology input feels no hemolysis is at play  -Fluctuating ALT/AST in hepatocellular pattern  -improving over all

## 2023-12-29 NOTE — PT/OT/SLP EVAL
Speech Language Pathology Evaluation  Cognitive Communication    Patient Name:  Bj Pate Jr.   MRN:  [id number]  Admitting Diagnosis: Acute osteomyelitis of lumbar spine    Recommendations:     Recommendations:                General Recommendations:  Dysphagia therapy, Speech/language therapy, and Cognitive-linguistic therapy, follow up with Registered Dietician   Diet recommendations: Full liquids, Purees, Thin liquids ok  Aspiration Precautions: only when vital sings stable, upright with all PO, 1 bite/sip at a time, Assistance with meals, Avoid talking while eating, Eliminate distractions, Feed only when awake/alert, Frequent oral care, Monitor for s/s of aspiration, Small bites/sips, and Strict aspiration precautions Continue to monitor for signs and symptoms of aspiration and discontinue oral feeding should you notice any of the following: watery eyes, reddened facial area, wet vocal quality, increased work of breathing, change in respiratory status, increased congestion, coughing, fever and/or change in level of alertness  General Precautions: Standard, aspiration, fall  Communication strategies:  provide increased time to answer and go to room if call light pushed    Assessment:     Bj Pate Jr. is a 73 y.o. male with an SLP diagnosis of Dysphagia and Cognitive-Linguistic Impairment.  He presents with light tan coating on lingual surface this service day. RN and MD notified of findings.     History:     Past Medical History:   Diagnosis Date    Acute on chronic systolic heart failure 12/13/2023    Anemia 06/04/2021    Anticoagulant long-term use     Basal ganglia hemorrhage     Left basal ganglia hemorrhage with resultant right-sided hemiparesis which has resolved.     Benign hypertension with CKD (chronic kidney disease) stage III      Cataract     Chronic idiopathic gout of multiple sites     Chronic kidney disease, stage 3     COPD (chronic obstructive pulmonary disease)     Erectile dysfunction      Gout     Hemorrhoids without complication     Hyperlipidemia     Morbid obesity     Obstructive sleep apnea on CPAP     Reactive airway disease without complication 11/12/2021    Severe sepsis 12/11/2023    Stroke 2016, 2006    Thalamic infarct, acute (right) 01/2016    Type 2 DM with CKD stage 3 and hypertension     On pravastatin for cardiovascular protection.        Past Surgical History:   Procedure Laterality Date    CARPAL TUNNEL RELEASE Left 2/19/2020    Procedure: RELEASE, CARPAL TUNNEL LEFT;  Surgeon: Maria Luisa Mccurdy MD;  Location: [facility] OR;  Service: Orthopedics;  Laterality: Left;    COLONOSCOPY N/A 7/28/2023    Procedure: COLONOSCOPY;  Surgeon: Jaylene Alvarado MD;  Location: [facility] ENDO;  Service: Endoscopy;  Laterality: N/A;    EPIDURAL STEROID INJECTION N/A 5/2/2019    Procedure: Injection, Steroid, Epidural Cervical;  Surgeon: Dariel Doan Jr., MD;  Location: [facility] ENDO;  Service: Pain Management;  Laterality: N/A;  Cervical Epidural Steroid Injection     [id number]    Arrive @ 1130; ASA; Check BG    EPIDURAL STEROID INJECTION Bilateral 5/29/2019    Procedure: Lumbar Medial Branch Blocks;  Surgeon: Dariel Doan Jr., MD;  Location: [facility];  Service: Pain Management;  Laterality: Bilateral;  Bilateral Lumbar Medial Branch Blocks L3, L4, L5    [id number]  32997    Attive @ 1030 (11 arrival request); NO Sedation; ASA; Check BG    EPIDURAL STEROID INJECTION Bilateral 7/3/2019    Procedure: Lumbar Medial Branch Blocks;  Surgeon: Dariel Doan Jr., MD;  Location: [facility] ENDO;  Service: Pain Management;  Laterality: Bilateral;  Bilateral Lumbar Medial Branch Blocks L3, L4, L5    [id number]  77131    Arrive @ 0930; NO Sedation; ASA; Check BG    EPIDURAL STEROID INJECTION N/A 8/7/2019    Procedure: Injection, Steroid, Epidural Cervical;  Surgeon: Dariel Doan Jr., MD;  Location: [facility] ENDO;  Service: Pain Management;  Laterality: N/A;  Cervical Epidural Steroid Injection  "C7-T1    [id number]    Arrive @ 1115; Last ASA 7/30; Check BG    ESOPHAGOGASTRODUODENOSCOPY N/A 7/28/2023    Procedure: EGD (ESOPHAGOGASTRODUODENOSCOPY);  Surgeon: Jaylene Alvarado MD;  Location: [facility] ENDO;  Service: Endoscopy;  Laterality: N/A;  inst via portal    FUSION, SPINE, LUMBAR, TLIF, POSTERIOR APPROACH, USING PEDICLE SCREW N/A 12/15/2023    Procedure: L-1 TO L-4 FUSION, SPINE, LUMBAR, TLIF, POSTERIOR APPROACH, USING PEDICLE SCREW;  Surgeon: Rolf Rincon MD;  Location: [facility] OR [address] [place] [address];  Service: Neurosurgery;  Laterality: N/A;    LEFT HEART CATHETERIZATION Left 9/21/2021    Procedure: Left heart cath 9am start, R rad access;  Surgeon: Dariel Conner MD;  Location: [facility] CATH LAB;  Service: Cardiology;  Laterality: Left;  RN PRE OP Covid NEGATIVE ON  9-20-21.  C A    MIDLINE CATHETER PLACEMENT  12/12/2023    NO PAST SURGERIES         Social/Occupational History: Patient lives alone.    Prior Intubation HX:  12/16/23- 12/17/23, 12/15/2023, 12/13/2023    Modified Barium Swallow: none prior at this facility     Chest X-Rays: 12/26/23: NG tube in the gastric fundus.  Left-sided PICC line in the SVC.  Heart size normal.  Small subsegmental atelectatic changes noted at the lung bases.  The lungs are otherwise clear.  No pleural effusion.     Prior diet: regular textures, thin liquids prior to admit.      Subjective     Pt reviewed with RN  Pt reviewed with MD  Pt presents alert  He explains, "Jose F" and asks ,"Can I have some lemonade?"    Pain/Comfort:  Pain Rating 1: other (see comments) (did not rate)  Location - Orientation 1: generalized  Pain Addressed 1: Nurse notified  Pain Rating Post-Intervention 1: other (see comments) (did not rate)    Respiratory Status: Room air    Objective:     Cognitive Status:    Arousal/Alertness Delayed response to stimuli , alert  Attention Sustained attention deficit , moderate  Orientation x1 to Person   Memory: STM:  Pt did not recall place for immediate recall " following review, Delayed: DNA 2/2 decreased immediate recall     Receptive Language:   Comprehension:      Questions Simple yes/no 70% accuracy I'ly  Commands  One step 50% Accuracy provided extra time     Pragmatics:    Pt with decreased eye contact, at times intermittent laughter     Expressive Language:  Verbal:    Initiation : delayed  Naming Confrontation ongoing assessment warranted 2/2 decreased attention this service day, ST to continue to assess in session and Single word responsive naming : Pt did not provide response  Conversational speech : Pt with moderate word-finding difficulty as c/b hesitations, interjections and short utterance length.       Motor Speech:  WFL    Voice:   Quality intermittent breathy quality  Intensity : low    Visual-Spatial:  TBA    Reading:   TBA      Written Expression:   TBA    Treatment: Pt found upright in bed with NG in place. RN in and out of the room during session. Family at the bedside.  Pt with intermittent throat clear in absence of PO trials. SLP noted coated lingual surface upon review of the oral mechanism .He denied throat pain or feeling of globus sensation. He requested water to drink. Pt seen with sips thin liquids (straw x3, tsp x2.) Pt with immediate cough on initial, larger straw sip thin liquids. Pt cued to clear throat and use additional swallow following cough.  No overt S/S aspiration with subsequent sips provided 1:1 assistance and cues for single sips. SLP educated Pt and family on findings, ongoing aspiration precautions, oral care, and SLP POC including ongoing ST upon d/c from acute.  No additional questions. Pt remained upright in bed with family in the room upon SLP exit/. RN and MD notified of findings upon SLP exit.     Goals:   Multidisciplinary Problems       SLP Goals          Problem: SLP    Goal Priority Disciplines Outcome   SLP Goal     SLP Ongoing, Progressing   Description: Speech Language Pathology Goals  Goals expected to be met by  12/25/23    1. Pt will tolerate thin liquids w/o overt S/S aspiration, MIN A  2. Pt will participate in ongoing assessment of swallow function to determine safest, least restrictive means of nutrition/hydration  3. Pt will participate in further assessment of cognitive-linguistic skills to determine ongoing POC needs  4. Educate Pt and family on aspiration precautions and SLP POC                         Plan:   Patient to be seen:  4 x/week   Plan of Care expires:  01/17/24  Plan of Care reviewed with:  patient, family   SLP Follow-Up:  Yes       Discharge recommendations:  Therapy Intensity Recommendations at Discharge: Moderate Intensity Therapy       Time Tracking:     SLP Treatment Date:   12/29/23  Speech Start Time:  1141  Speech Stop Time:  1202     Speech Total Time (min):  21 min    Billable Minutes: Eval 12  and Treatment Swallowing Dysfunction 8    12/29/2023

## 2023-12-29 NOTE — PROGRESS NOTES
[facility] Medicine  Progress note    Team: [facility] HOSP MED N April Mancuso MD  Admit Date: 12/10/2023    Principal Problem:  Acute osteomyelitis of lumbar spine    Interval hx:  No new complaints    PEx  Temp:  [97.6 °F (36.4 °C)-98.7 °F (37.1 °C)]   Pulse:  []   Resp:  [14-20]   BP: (100-121)/(57-77)   SpO2:  [95 %-99 %]     Intake/Output Summary (Last 24 hours) at 12/29/2023 0716  Last data filed at 12/28/2023 2100  Gross per 24 hour   Intake 170 ml   Output 800 ml   Net -630 ml       General Appearance: no acute distress, WD, obese, ill-appearing  Heart: regular rate and rhythm, no heave  Respiratory: Normal respiratory effort, symmetric excursion, bilateral vesicular breath sounds   Abdomen: Soft, non-tender; bowel sounds active  Skin: intact, no rash, no ulcers  Neurologic:  No focal numbness or weakness  Mental status: Alert, oriented x name, affect appropriate     Recent Labs   Lab 12/26/23  0855 12/27/23  0703 12/28/23  0107   WBC 17.06* 16.65* 15.66*   HGB 7.0* 8.5* 7.7*   HCT 22.0* 25.6* 22.6*    409 334     Recent Labs   Lab 12/26/23  0350 12/26/23  0855 12/27/23  0703 12/28/23  0107   * 147* 145 142   K 3.3* 3.2* 3.4* 3.1*    109 105 105   CO2 29 30* 32* 31*   BUN 44* 44* 42* 47*   CREATININE 0.8 0.9 0.9 1.0   GLU 99 106 139* 132*   CALCIUM 8.2* 9.0 8.6* 8.1*   MG 1.7  --  1.7 1.6   PHOS 1.0*  --  2.8 2.6*     Recent Labs   Lab 12/23/23  0426 12/24/23  0731 12/24/23  0936 12/25/23  0333 12/26/23  0855 12/27/23  0703 12/28/23  0107   ALKPHOS 232*   < >  --    < > 203* 212* 202*   *   < >  --    < > 238* 251* 195*   *   < >  --    < > 319* 349* 210*   ALBUMIN 1.2*   < >  --    < > 1.0* 1.1* 1.1*   PROT 6.3   < >  --    < > 6.0 6.2 6.1   BILITOT 1.1*   < >  --    < > 0.7 0.6 0.5   INR 1.1  --  1.1  --   --  1.1  --     < > = values in this interval not displayed.        Recent Labs   Lab 12/26/23  2212 12/27/23  1222 12/27/23  1647 12/28/23  1215 12/28/23  1828  12/28/23 2028   POCTGLUCOSE 162* 153* 132* 128* 152* 164*       Scheduled Meds:   colchicine  0.6 mg Oral Daily    enoxparin  40 mg Subcutaneous Q24H (prophylaxis, 1700)    fluticasone furoate-vilanteroL  1 puff Inhalation Daily    furosemide (LASIX) injection  80 mg Intravenous Q12H    losartan  25 mg Oral Daily    meropenem (MERREM) IVPB  2 g Intravenous Q8H    modafiniL  200 mg Per NG tube Before breakfast    psyllium husk (aspartame)  1 packet Per NG tube BID    sodium chloride 0.9%  10 mL Intravenous Q6H    vancomycin (VANCOCIN) IV (PEDS and ADULTS)  1,250 mg Intravenous Q24H    vancomycin  125 mg Oral Q6H     Continuous Infusions:  As Needed:  0.9%  NaCl infusion (for blood administration), dextrose 10%, dextrose 10%, glucagon (human recombinant), insulin aspart U-100, ondansetron, Flushing PICC/Midline Protocol **AND** sodium chloride 0.9% **AND** sodium chloride 0.9%, Pharmacy to dose Vancomycin consult **AND** vancomycin - pharmacy to dose    Assessment and Plan  / Problems managed today    * Acute osteomyelitis of lumbar spine  Psoas abscess  Was admitted to [facility] for post operative monitoring following a L1-L4 segmental posterolateral fusion, L2-L4 laminectomy and L2-L3 TLIF for L2-L4 discitis/osteomyelitis on 12/15.   S/p IR biopsy of psoas abscess and L2-L3 disc space.   Persistent - Weakness in both UE and Les likely 2/2 spine/psoas infx  Fall precautions  -Continue  Meropenem and Vancomycin per ID recs    Psoas muscle abscess  See severe sepsis    Discitis  See severe sepsis    Acute metabolic encephalopathy  Septic encephalopathy  delirium  Pain  polypharmacy    Neurology consulted, low suspicion for seizures, possibly gout flare per their assessment but this seems unlikely given his presentation and MRI/CT findings  Placed on provigil due to somnolence    Hypokalemia  treated    Hematocrit precipitous drop  1 unit prbc on 12/25/23  FOBT pending  Persistent hb low, recheck pending   trend      Pain of  left hand  Maybe gout?   Improved  Colchicine MWF for prophylaxis    Hypophosphatemia  treated    Thrombophlebitis  RUE cephalic vein thrombus      Hemiparesis  History of R sided hemiparesis from prior CVA. Mostly resolved per family.  PT/OT     Stercoral colitis  CT AP w rectal colitis vs constipation    - brown bomb on 12/14 with large bowel movement  - Aggressive Bowel regimen after surgery    Back pain  Likely secondary to L2-L3 diskitis/ostemyelitis.    MRI spine completed  NSGY consulted. Surgery today for L1-L4 fusion and decompression  Pain meds PRN    Malnutrition  Maintenance IVFs  NG tube placement ordered  RDN consulted, appreciate recs  Tube feeds held for surgery today    Acute on chronic systolic heart failure  anasarca  Echo 12/10/2023  . Mild global hypokinesis present. There is mildly reduced systolic function with a visually estimated ejection fraction of 45 - 50%.    Right Ventricle: Normal right ventricular cavity size. Wall thickness is normal. Right ventricle wall motion  is normal. Systolic function is normal.  Stable renal function w/ fluctuating LFTs;   -continue iV lasix and can resume losartan as BP tolerates  - discharged on oral furosemide 80 mg with HCTZ 25 mg daily  - concentrated tube feeds, no extra free water boluses, and oral fluid restriction 1200 mL    Leukocytosis  Could be related to transaminitis vs infx  F/u abd CT abd  Continue Invanz and IV vanc; also continue oral vanc per ID (for C diff prevention given hx) for long course - see their note on 12/27      Urinary retention  Rivera placed by ED, PVR now that he has external condom catheter. Due to diskitis/osteomyelitis, concern for possible spinal cord compression.    Bladder scan as needed  Document PVRs    Transaminitis  -Acute bump noted  -700s --> 300s --> 200s --> 500s; concern for -hypoperfusion of the liver per hepatology; maybe hepatic congestion  -Stable bilirubin; no N/V  -No obvious DILI per pharm  review  -unremarkable Ammonia, hep viral panel, INR, CPK, GGT, US abd, CT abd findings  -Serological workup in process per hepatology consult,   -Was stable haptoglobin  and bilirubin hematology input feels no hemolysis is at play  -Fluctuating ALT/AST in hepatocellular pattern  -improving over all    Debility  Weakness in both UE and Les likely 2/2 spine/psoas infx  Fall precautions  Anesthesia based repeat MRI spine pending    History of CVA (cerebrovascular accident)  -R thalamic, 2016  No acute processes on head CT or brain MRI        Discharge Planning   GLORIA: 12/29/2023     Code Status: Full Code   Is the patient medically ready for discharge?: Yes    Reason for patient still in hospital (select all that apply): Patient trending condition and Treatment  Discharge Plan A: Long-term acute care facility (LTAC)   Discharge Delays: (!) Change in Medical Condition    Diet:  puree diet  GI PPx: not needed  DVT PPx:  enoxaparin  Airways: room air  Wounds: none    Goals of Care:  Return to prior functional status     April Mancuso MD

## 2023-12-29 NOTE — PT/OT/SLP PROGRESS
Occupational Therapy   Treatment    Name: Bj Pate Jr.  MRN: [id number]  Admitting Diagnosis:  Acute osteomyelitis of lumbar spine  14 Days Post-Op  Procedure(s):  L-1 TO L-4 FUSION, SPINE, LUMBAR, TLIF, POSTERIOR APPROACH, USING PEDICLE SCREW   Recommendations:     Discharge Recommendations: Moderate Intensity Therapy  Discharge Equipment Recommendations:  lift device, hospital bed, wheelchair  Barriers to discharge:   (increased skilled assistance for ADLs and mobility)    Assessment:     Bj Pate Jr. is a 73 y.o. male with a medical diagnosis of Acute osteomyelitis of lumbar spine.  He presents with the following performance deficits affecting function are weakness, impaired endurance, impaired self care skills, impaired functional mobility, decreased coordination, impaired cognition, abnormal tone, edema, pain, decreased safety awareness, impaired balance, decreased lower extremity function, impaired coordination, impaired joint extensibility, impaired muscle length, impaired skin, impaired fine motor, decreased ROM, decreased upper extremity function, gait instability.     Rehab Prognosis:  Fair; patient would benefit from acute skilled OT services to address these deficits and reach maximum level of function.       Plan:     Patient to be seen 3 x/week to address the above listed problems via self-care/home management, therapeutic activities, neuromuscular re-education, therapeutic exercises  Plan of Care Expires: 01/19/24  Plan of Care Reviewed with: patient, family    Subjective     Chief Complaint: c/o pain during PROM  Patient/Family Comments/goals: family reported patient d/c to LTAC today  Pain/Comfort:  Pain Rating 1:  (unrated)  Location - Side 1: Bilateral  Location - Orientation 1: generalized  Location 1: arm  Pain Addressed 1: Reposition, Cessation of Activity  Pain Rating Post-Intervention 1:  (no indication of pain at rest)    Objective:     Communicated with: nurse jorge prior to  session.  Patient found HOB elevated with bowel management system, telemetry, Condom Catheter, NG tube upon OT entry to room.    General Precautions: Standard, aspiration, fall    Orthopedic Precautions:spinal precautions  Braces: LSO  Respiratory Status: Room air     Occupational Performance:     [facility] 6 Click ADL: 6    Treatment & Education:  Patient received A&O x1 (self), however, patient was able to state he was in hospital when asked. Patient participated in gentle, progressive B UE and B LE to all joints in available planes to maintain skin and joint integrity. Patient with significant extensor tone in B LE and flexor tone in B UE. Positioning techniques to maintain skin/joint integrity as well as edema management for B UE. Addressed all patient needs within HOUSE scope of practice.      Patient left HOB elevated with all lines intact, call button in reach, nurse notified, and family present    GOALS:   Multidisciplinary Problems       Occupational Therapy Goals          Problem: Occupational Therapy    Goal Priority Disciplines Outcome Interventions   Occupational Therapy Goal     OT, PT/OT Ongoing, Progressing    Description: Goals to be met by: 1/19/24     Patient will increase functional independence with ADLs by performing:    UE Dressing with Moderate Assistance.  LE Dressing with Maximum Assistance.  Grooming while seated with Moderate Assistance.  Toileting from bedside commode with Maximum Assistance for hygiene and clothing management.   Step transfer with Moderate Assistance  Toilet transfer to bedside commode with Moderate Assistance.                         Time Tracking:     OT Date of Treatment: 12/29/23  OT Start Time: 1320  OT Stop Time: 1338  OT Total Time (min): 18 min    Billable Minutes:Therapeutic Activity 18    OT/АЛЕКСАНДР: АЛЕКСАНДР     Number of АЛЕКСАНДР visits since last OT visit: 3    12/29/2023

## 2024-01-01 NOTE — DISCHARGE SUMMARY
Discharge Summary  Hospital Medicine    Attending Provider on Discharge: April Mancuso MD  Hospital Medicine Team: [facility] [place] [facility]  Date of Admission:  12/10/2023     Date of Discharge:  12/29/2023  Code status: Full Code    Active Hospital Problems    Diagnosis  POA    *Acute osteomyelitis of lumbar spine [M46.26]  Yes     Priority: 1 - High    Discitis [M46.40]  Yes     Priority: 1 - High    Psoas muscle abscess [K68.12]  Yes     Priority: 1 - High    Acute metabolic encephalopathy [G93.41]  Yes     Priority: 3     Acute blood loss anemia [D62]  No    Pain [R52]  Yes    Hypernatremia [E87.0]  Yes    Hypokalemia [E87.6]  No    Hematocrit precipitous drop [R71.0]  No    Pain of left hand [M79.642]  Clinically Undetermined    Thrombophlebitis [I80.9]  No    Atelectasis [J98.11]  No    Hypophosphatemia [E83.39]  No    Intertriginous dermatitis associated with moisture [L30.4]  Yes    HILARIO on CPAP [G47.33]  Yes    Hemiparesis [G81.90]  Yes    Acute on chronic systolic heart failure [I50.23]  Yes    Malnutrition [E46]  Yes     Noted that pt has had a -19% wt change since admit.       Back pain [M54.9]  Yes    Stercoral colitis [K52.89]  Yes    Leukocytosis [D72.829]  Yes    Urinary retention [R33.9]  Yes    Transaminitis [R74.01]  No    Debility [R53.81]  Yes    History of CVA (cerebrovascular accident) [Z86.73]  Not Applicable      Resolved Hospital Problems    Diagnosis Date Resolved POA    Septic encephalopathy [G93.41] 12/22/2023 Yes     Priority: 1 - High    Severe sepsis [A41.9, R65.20] 12/21/2023 Yes     Priority: 1 - High    Class 2 obesity with body mass index (BMI) of 39.0 to 39.9 in adult [E66.9, Z68.39] 12/21/2023 Not Applicable     HPI  This is a 73M with a h/o CVA  about 8 to 10 years ago with R sided deficits, DM, HTN who was BIB from home with family with 3 weeks of abdominal pain. Seen at multiple EDS, and generally unremarkable work up as per family. Within last few days started to have progressive  worsening back pain. Became acute altered 2 days ago w/, LE weakness now requiring assistance to walk, as well as urinary incontinence. Patient is A/O 0x at admit. Accompanied by Daughter, most of history from her.     Admission to ED patient was febrile at 102 F, tachycardic 130, tachypneic at 23, hypertensive at 140/72 initially satting well on room air later requiring 2 L nasal cannula.  WBCs elevated 12.74 anemia 11.0 platelets normal, sed rate 120+, BUN 27 magnesium 1.5, albumin 2.4, .1, troponin 0.126 urine trace proteinuria trace ketones lactate initially elevated 2.6 later 1.3 CT head unremarkable for acute processes CT abdomen and pelvis and MRI lumbar spine shows left psoas abscess L2-L3 osteomyleitis, and diskitis.  Neurosurgery was consulted recommendations are pending.  Received Cefepime and Vanc    Hospital Course  * Acute osteomyelitis of lumbar spine  Psoas abscess  Was admitted to [facility] for post operative monitoring following a L1-L4 segmental posterolateral fusion, L2-L4 laminectomy and L2-L3 TLIF for L2-L4 discitis/osteomyelitis on 12/15.   S/p IR biopsy of psoas abscess and L2-L3 disc space.   Persistent - Weakness in both UE and Les likely 2/2 spine/psoas infx  Fall precautions  -Continue  Meropenem and Vancomycin per ID recs    Psoas muscle abscess  See severe sepsis    Discitis  See severe sepsis    Acute metabolic encephalopathy  Septic encephalopathy  delirium  Pain  polypharmacy  Neurology consulted, low suspicion for seizures, possibly gout flare per their assessment but this seems unlikely given his presentation and MRI/CT findings  Placed on provigil due to somnolence    Hypokalemia  treated    Hematocrit precipitous drop  1 unit prbc on 12/25/23  FOBT pending  Persistent hb low, recheck pending   trend    Pain of left hand  Maybe gout?   Improved  Colchicine MWF for prophylaxis    Hypophosphatemia  treated    Thrombophlebitis  RUE cephalic vein thrombus    Hemiparesis  History of R  sided hemiparesis from prior CVA. Mostly resolved per family.  PT/OT     Stercoral colitis  CT AP w rectal colitis vs constipation    - brown bomb on 12/14 with large bowel movement  - Aggressive Bowel regimen after surgery    Back pain  Likely secondary to L2-L3 diskitis/ostemyelitis.    MRI spine completed  NSGY consulted. Surgery today for L1-L4 fusion and decompression  Pain meds PRN    Malnutrition  Maintenance IVFs  NG tube placement ordered  RDN consulted, appreciate recs  Tube feeds held for surgery today    Acute on chronic systolic heart failure  anasarca  Echo 12/10/2023  . Mild global hypokinesis present. There is mildly reduced systolic function with a visually estimated ejection fraction of 45 - 50%.    Right Ventricle: Normal right ventricular cavity size. Wall thickness is normal. Right ventricle wall motion  is normal. Systolic function is normal.  Stable renal function w/ fluctuating LFTs;   -continue iV lasix and can resume losartan as BP tolerates  - discharged on oral furosemide 80 mg with HCTZ 25 mg daily  - concentrated tube feeds, no extra free water boluses, and oral fluid restriction 1200 mL    Leukocytosis  Could be related to transaminitis vs infx  F/u abd CT abd  Continue Invanz and IV vanc; also continue oral vanc per ID (for C diff prevention given hx) for long course - see their note on 12/27    Urinary retention  Rivera placed by ED, PVR now that he has external condom catheter. Due to diskitis/osteomyelitis, concern for possible spinal cord compression.    Bladder scan as needed  Document PVRs    Transaminitis  -Acute bump noted  -700s --> 300s --> 200s --> 500s; concern for -hypoperfusion of the liver per hepatology; maybe hepatic congestion. Started on diuresis  -Stable bilirubin; no N/V  -No obvious DILI per pharm review  -unremarkable Ammonia, hep viral panel, INR, CPK, GGT, US abd, CT abd findings  -Serological workup in process per hepatology consult,   -Was stable haptoglobin   and bilirubin hematology input feels no hemolysis is at play  -Fluctuating ALT/AST in hepatocellular pattern  -improving over all prior discharge  - follow up with heptaology as outpatient    Debility  Weakness in both UE and Les likely 2/2 spine/psoas infx  Fall precautions  Anesthesia based repeat MRI spine pending    History of CVA (cerebrovascular accident)  -R thalamic, 2016  No acute processes on head CT or brain MRI      Procedures: none    Consultants: hepatology, interventional radiology, infectious disease, neurosurgery    Discharge Medication List as of 12/29/2023  5:13 PM        START taking these medications    Details   colchicine (COLCRYS) 0.6 mg tablet Take 1 tablet (0.6 mg total) by mouth every Mon, Wed, Fri., Starting Fri 12/29/2023, Until Sat 12/28/2024, Normal      Lactobacillus rhamnosus GG (CULTURELLE) 10 billion cell capsule 1 capsule by Per G Tube route once daily., Starting Thu 12/28/2023, Normal      losartan (COZAAR) 25 MG tablet Take 1 tablet (25 mg total) by mouth once daily., Starting Fri 12/29/2023, Until Sat 12/28/2024, No Print      magnesium oxide (MAG-OX) 400 mg (241.3 mg magnesium) tablet Take 1 tablet (400 mg total) by mouth 3 (three) times daily. Stop 1/27/2023, Starting Thu 12/28/2023, Until Sat 1/27/2024, No Print      nystatin (MYCOSTATIN) 100,000 unit/mL suspension Take 6 mLs (600,000 Units total) by mouth 4 (four) times daily. for 10 days, Starting Fri 12/29/2023, Until Mon 1/8/2024, Normal      potassium bicarbonate (K-LYTE) disintegrating tablet 1 tablet (25 mEq total) by Per G Tube route once daily. Stop 1/4/2024. May take orally if NG tube not available, Starting Thu 12/28/2023, No Print      psyllium husk, aspartame, (METAMUCIL) 3.4 gram PwPk packet 1 packet by Per NG tube route 2 (two) times daily., Starting Thu 12/28/2023, No Print      sodium chloride 0.9% SolP 100 mL with meropenem 1 gram SolR 2 g Inject 2 g into the vein every 8 (eight) hours. Stop 2/9/24. ID  evaluation 1/26/2023 to stop earlier., Starting Thu 12/28/2023, Until Fri 2/9/2024, No Print      vancomycin 125 mg/5 mL Soln Take 5 mLs (125 mg total) by mouth every 6 (six) hours. Stop 1/6/2024 for 14 days, Starting Sat 12/23/2023, Until Sat 1/6/2024, No Print           CONTINUE these medications which have CHANGED    Details   dextrose 5 % (D5W) SolP 250 mL with vancomycin 1.25 gram SolR 1,250 mg Inject 1,250 mg into the vein once daily., Starting Fri 12/29/2023, Until Fri 2/9/2024, Print      furosemide (LASIX) 80 MG tablet Take 1 tablet (80 mg total) by mouth once daily., Starting Thu 12/28/2023, Until Fri 12/27/2024, No Print      potassium, sodium phosphates (PHOS-NAK) 280-160-250 mg PwPk 2 packets by Per NG tube route daily with breakfast. Stop 1/4/2024. May take orally if NG tube is not available for 7 doses, Starting Thu 12/28/2023, Until Thu 1/4/2024, No Print           CONTINUE these medications which have NOT CHANGED    Details   aspirin (ECOTRIN) 81 MG EC tablet Take 81 mg by mouth once daily., Historical Med      atorvastatin (LIPITOR) 40 MG tablet Take 1 tablet (40 mg total) by mouth once daily., Starting Wed 9/6/2023, Normal      blood sugar diagnostic Strp To check BG 2 times daily, to use with insurance preferred meter, Print      fluticasone-salmeterol diskus inhaler 500-50 mcg Inhale 1 puff into the lungs 2 (two) times daily. Controller, Starting Thu 3/16/2023, Until Fri 3/15/2024, Normal      hydroCHLOROthiazide (HYDRODIURIL) 25 MG tablet Take 25 mg by mouth once daily., Historical Med      lancets Misc To check BG 2 times daily, to use with insurance preferred meter, Print      tamsulosin (FLOMAX) 0.4 mg Cap Take 0.4 mg by mouth once daily., Starting Sat 9/23/2023, Historical Med           STOP taking these medications       blood pressure test kit-large Kit Comments:   Reason for Stopping:         blood-glucose meter kit Comments:   Reason for Stopping:         dextrose 5 % (D5W) SolP 250 mL  with vancomycin 1.5 gram SolR 1,500 mg Comments:   Reason for Stopping:         diltiaZEM (CARDIZEM CD) 240 MG 24 hr capsule Comments:   Reason for Stopping:         gabapentin (NEURONTIN) 600 MG tablet Comments:   Reason for Stopping:         HYDROcodone-acetaminophen (NORCO) 5-325 mg per tablet Comments:   Reason for Stopping:         ondansetron (ZOFRAN-ODT) 4 MG TbDL Comments:   Reason for Stopping:               Discharge Diet:2 gram sodium diet     Activity: activity as tolerated    Discharge Condition: Good    Disposition: Long Term Care    Tests pending at the time of discharge: none      Time spent  on the discharge of the patient including review of hospital course with the patient. reviewing discharge medications and arranging follow-up care 35 min  Discharge examination Patient was seen and examined on the date of discharge and determined to be suitable for discharge.    Discharge plan     Future Appointments   Date Time Provider Department Center   1/11/2024 10:00 AM Jaida Rodriguez PA-C [facility] MED [place]   1/25/2024  2:15 PM [facility] OIC-XRAY [facility]   1/25/2024  3:00 PM Fern Gomez PA-C [facility] [id number] Connor Mina   1/26/2024  2:30 PM Kami Becker, APRN, ANP [facility] ID [facility] Mancuso MD

## 2024-01-02 ENCOUNTER — OUTPATIENT CASE MANAGEMENT (OUTPATIENT)
Dept: ADMINISTRATIVE | Facility: OTHER | Age: 74
End: 2024-01-02
Payer: MEDICARE

## 2024-01-02 NOTE — PROGRESS NOTES
Outpatient Care Management  Plan of Care Follow Up Visit    Patient: Bj Pate Jr.  MRN: [id number]  Date of Service: 01/02/2024  Completed by: Sharla Sheriff RN  Referral Date:     Reason for Visit   Patient presents with    OPCM Chart Review    Case Closure       Brief Summary: OPCM case closure due to pt was transferred to LTAC per chart review.

## 2024-01-10 ENCOUNTER — TELEPHONE (OUTPATIENT)
Dept: INFECTIOUS DISEASES | Facility: CLINIC | Age: 74
End: 2024-01-10
Payer: MEDICARE

## 2024-01-10 NOTE — TELEPHONE ENCOUNTER
----- Message from Sury Li PA-C sent at 1/10/2024  9:53 AM CST -----  Any weekly labs on this patient?

## 2024-01-15 LAB
FUNGUS SPEC CULT: NORMAL

## 2024-01-25 ENCOUNTER — PATIENT MESSAGE (OUTPATIENT)
Dept: INFECTIOUS DISEASES | Facility: CLINIC | Age: 74
End: 2024-01-25
Payer: MEDICARE

## 2024-01-31 LAB
ACID FAST MOD KINY STN SPEC: NORMAL
MYCOBACTERIUM SPEC QL CULT: NORMAL

## 2024-02-02 LAB
ACID FAST MOD KINY STN SPEC: NORMAL
MYCOBACTERIUM SPEC QL CULT: NORMAL

## 2024-03-04 ENCOUNTER — HOSPITAL ENCOUNTER (INPATIENT)
Facility: HOSPITAL | Age: 74
LOS: 23 days | Discharge: NURSING, REHAB OR LONG-TERM CARE | DRG: 698 | End: 2024-03-27
Attending: EMERGENCY MEDICINE | Admitting: INTERNAL MEDICINE
Payer: MEDICARE

## 2024-03-04 DIAGNOSIS — G93.41 ACUTE METABOLIC ENCEPHALOPATHY: Primary | ICD-10-CM

## 2024-03-04 DIAGNOSIS — I50.9 HEART FAILURE: ICD-10-CM

## 2024-03-04 DIAGNOSIS — T68.XXXA HYPOTHERMIA: ICD-10-CM

## 2024-03-04 DIAGNOSIS — R00.0 TACHYCARDIA: ICD-10-CM

## 2024-03-04 DIAGNOSIS — R41.82 ALTERED MENTAL STATUS, UNSPECIFIED ALTERED MENTAL STATUS TYPE: ICD-10-CM

## 2024-03-04 DIAGNOSIS — T68.XXXA HYPOTHERMIA, INITIAL ENCOUNTER: ICD-10-CM

## 2024-03-04 DIAGNOSIS — A41.9 SEPSIS: ICD-10-CM

## 2024-03-04 DIAGNOSIS — A41.9 SEPSIS, DUE TO UNSPECIFIED ORGANISM, UNSPECIFIED WHETHER ACUTE ORGAN DYSFUNCTION PRESENT: ICD-10-CM

## 2024-03-04 DIAGNOSIS — I50.9 HEART FAILURE, UNSPECIFIED HF CHRONICITY, UNSPECIFIED HEART FAILURE TYPE: ICD-10-CM

## 2024-03-04 DIAGNOSIS — R00.1 BRADYCARDIA: ICD-10-CM

## 2024-03-04 DIAGNOSIS — R07.9 CHEST PAIN: ICD-10-CM

## 2024-03-04 DIAGNOSIS — G93.40 ACUTE ENCEPHALOPATHY: ICD-10-CM

## 2024-03-04 DIAGNOSIS — R41.82 ALTERED MENTAL STATUS: ICD-10-CM

## 2024-03-04 DIAGNOSIS — A49.8 CLOSTRIDIUM DIFFICILE INFECTION: ICD-10-CM

## 2024-03-04 PROBLEM — E16.2 HYPOGLYCEMIA: Status: ACTIVE | Noted: 2024-03-04

## 2024-03-04 LAB
ALBUMIN SERPL BCP-MCNC: 1.8 G/DL (ref 3.5–5.2)
ALBUMIN SERPL BCP-MCNC: 2 G/DL (ref 3.5–5.2)
ALLENS TEST: ABNORMAL
ALLENS TEST: NORMAL
ALP SERPL-CCNC: 103 U/L (ref 55–135)
ALP SERPL-CCNC: 91 U/L (ref 55–135)
ALT SERPL W/O P-5'-P-CCNC: 15 U/L (ref 10–44)
ALT SERPL W/O P-5'-P-CCNC: 17 U/L (ref 10–44)
AMPHET+METHAMPHET UR QL: NEGATIVE
ANION GAP SERPL CALC-SCNC: 4 MMOL/L (ref 8–16)
ANION GAP SERPL CALC-SCNC: 7 MMOL/L (ref 8–16)
ASCENDING AORTA: 2.85 CM
AST SERPL-CCNC: 21 U/L (ref 10–40)
AST SERPL-CCNC: 23 U/L (ref 10–40)
AV INDEX (PROSTH): 0.88
AV MEAN GRADIENT: 2 MMHG
AV PEAK GRADIENT: 4 MMHG
AV VALVE AREA BY VELOCITY RATIO: 2.89 CM²
AV VALVE AREA: 2.68 CM²
AV VELOCITY RATIO: 0.95
BACTERIA #/AREA URNS AUTO: ABNORMAL /HPF
BARBITURATES UR QL SCN>200 NG/ML: NEGATIVE
BASOPHILS # BLD AUTO: 0 K/UL (ref 0–0.2)
BASOPHILS # BLD AUTO: 0 K/UL (ref 0–0.2)
BASOPHILS NFR BLD: 0 % (ref 0–1.9)
BASOPHILS NFR BLD: 0 % (ref 0–1.9)
BENZODIAZ UR QL SCN>200 NG/ML: NEGATIVE
BILIRUB SERPL-MCNC: 0.2 MG/DL (ref 0.1–1)
BILIRUB SERPL-MCNC: 0.2 MG/DL (ref 0.1–1)
BILIRUB UR QL STRIP: NEGATIVE
BSA FOR ECHO PROCEDURE: 2.34 M2
BUN SERPL-MCNC: 20 MG/DL (ref 6–30)
BUN SERPL-MCNC: 20 MG/DL (ref 8–23)
BUN SERPL-MCNC: 22 MG/DL (ref 8–23)
BUN SERPL-MCNC: 9 MG/DL (ref 6–30)
BZE UR QL SCN: NEGATIVE
CALCIUM SERPL-MCNC: 8.6 MG/DL (ref 8.7–10.5)
CALCIUM SERPL-MCNC: 9.1 MG/DL (ref 8.7–10.5)
CANNABINOIDS UR QL SCN: ABNORMAL
CHLORIDE SERPL-SCNC: 105 MMOL/L (ref 95–110)
CHLORIDE SERPL-SCNC: 109 MMOL/L (ref 95–110)
CHLORIDE SERPL-SCNC: 111 MMOL/L (ref 95–110)
CHLORIDE SERPL-SCNC: 119 MMOL/L (ref 95–110)
CK SERPL-CCNC: 53 U/L (ref 20–200)
CLARITY UR REFRACT.AUTO: ABNORMAL
CO2 SERPL-SCNC: 24 MMOL/L (ref 23–29)
CO2 SERPL-SCNC: 26 MMOL/L (ref 23–29)
COLOR UR AUTO: ABNORMAL
CREAT SERPL-MCNC: 0.4 MG/DL (ref 0.5–1.4)
CREAT SERPL-MCNC: 0.9 MG/DL (ref 0.5–1.4)
CREAT SERPL-MCNC: 0.9 MG/DL (ref 0.5–1.4)
CREAT SERPL-MCNC: 1.2 MG/DL (ref 0.5–1.4)
CREAT UR-MCNC: 17 MG/DL (ref 23–375)
CREAT UR-MCNC: 65 MG/DL (ref 23–375)
CV ECHO LV RWT: 0.44 CM
DELSYS: ABNORMAL
DIFFERENTIAL METHOD BLD: ABNORMAL
DIFFERENTIAL METHOD BLD: ABNORMAL
DOP CALC AO PEAK VEL: 0.98 M/S
DOP CALC AO VTI: 19.85 CM
DOP CALC LVOT AREA: 3 CM2
DOP CALC LVOT DIAMETER: 1.97 CM
DOP CALC LVOT PEAK VEL: 0.93 M/S
DOP CALC LVOT STROKE VOLUME: 53.19 CM3
DOP CALCLVOT PEAK VEL VTI: 17.46 CM
E WAVE DECELERATION TIME: 231.52 MSEC
E/A RATIO: 0.65
E/E' RATIO: 6.38 M/S
ECHO LV POSTERIOR WALL: 0.8 CM (ref 0.6–1.1)
EOSINOPHIL # BLD AUTO: 0 K/UL (ref 0–0.5)
EOSINOPHIL # BLD AUTO: 0 K/UL (ref 0–0.5)
EOSINOPHIL NFR BLD: 0.8 % (ref 0–8)
EOSINOPHIL NFR BLD: 0.9 % (ref 0–8)
ERYTHROCYTE [DISTWIDTH] IN BLOOD BY AUTOMATED COUNT: 17 % (ref 11.5–14.5)
ERYTHROCYTE [DISTWIDTH] IN BLOOD BY AUTOMATED COUNT: 17.2 % (ref 11.5–14.5)
ERYTHROCYTE [SEDIMENTATION RATE] IN BLOOD BY WESTERGREN METHOD: 12 MM/H
ERYTHROCYTE [SEDIMENTATION RATE] IN BLOOD BY WESTERGREN METHOD: 18 MM/H
ERYTHROCYTE [SEDIMENTATION RATE] IN BLOOD BY WESTERGREN METHOD: 20 MM/H
ERYTHROCYTE [SEDIMENTATION RATE] IN BLOOD BY WESTERGREN METHOD: 22 MM/H
EST. GFR  (NO RACE VARIABLE): >60 ML/MIN/1.73 M^2
EST. GFR  (NO RACE VARIABLE): >60 ML/MIN/1.73 M^2
ETHANOL UR-MCNC: <10 MG/DL
FENTANYL UR QL SCN: NEGATIVE
FERRITIN SERPL-MCNC: 910 NG/ML (ref 20–300)
FIO2: 21
FIO2: 30
FIO2: 40
FIO2: 40
FOLATE SERPL-MCNC: 2.4 NG/ML (ref 4–24)
FRACTIONAL SHORTENING: 35 % (ref 28–44)
GLUCOSE SERPL-MCNC: 36 MG/DL (ref 70–110)
GLUCOSE SERPL-MCNC: 60 MG/DL (ref 70–110)
GLUCOSE SERPL-MCNC: 62 MG/DL (ref 70–110)
GLUCOSE SERPL-MCNC: 68 MG/DL (ref 70–110)
GLUCOSE UR QL STRIP: ABNORMAL
HCO3 UR-SCNC: 23.3 MMOL/L (ref 24–28)
HCO3 UR-SCNC: 24.1 MMOL/L (ref 24–28)
HCO3 UR-SCNC: 25 MMOL/L (ref 24–28)
HCO3 UR-SCNC: 29.5 MMOL/L (ref 24–28)
HCO3 UR-SCNC: 29.8 MMOL/L (ref 24–28)
HCO3 UR-SCNC: 30.3 MMOL/L (ref 24–28)
HCT VFR BLD AUTO: 25 % (ref 40–54)
HCT VFR BLD AUTO: 27.5 % (ref 40–54)
HCT VFR BLD CALC: 25 %PCV (ref 36–54)
HCT VFR BLD CALC: 26 %PCV (ref 36–54)
HCT VFR BLD CALC: <15 %PCV (ref 36–54)
HGB BLD-MCNC: 7.8 G/DL (ref 14–18)
HGB BLD-MCNC: 8.7 G/DL (ref 14–18)
HGB UR QL STRIP: ABNORMAL
HIV 1+2 AB+HIV1 P24 AG SERPL QL IA: NORMAL
HYALINE CASTS UR QL AUTO: 0 /LPF
IMM GRANULOCYTES # BLD AUTO: 0 K/UL (ref 0–0.04)
IMM GRANULOCYTES # BLD AUTO: 0.02 K/UL (ref 0–0.04)
IMM GRANULOCYTES NFR BLD AUTO: 0 % (ref 0–0.5)
IMM GRANULOCYTES NFR BLD AUTO: 0.6 % (ref 0–0.5)
INFLUENZA A, MOLECULAR: NOT DETECTED
INFLUENZA B, MOLECULAR: NOT DETECTED
INTERVENTRICULAR SEPTUM: 0.9 CM (ref 0.6–1.1)
IRON SERPL-MCNC: 55 UG/DL (ref 45–160)
IVRT: 94.2 MSEC
KETONES UR QL STRIP: NEGATIVE
LA MAJOR: 5.36 CM
LA MINOR: 5.41 CM
LA WIDTH: 4.04 CM
LACTATE SERPL-SCNC: 1 MMOL/L (ref 0.5–2.2)
LDH SERPL L TO P-CCNC: 1.09 MMOL/L (ref 0.5–2.2)
LEFT ATRIUM SIZE: 2.35 CM
LEFT ATRIUM VOLUME INDEX MOD: 20 ML/M2
LEFT ATRIUM VOLUME INDEX: 19.1 ML/M2
LEFT ATRIUM VOLUME MOD: 45.45 CM3
LEFT ATRIUM VOLUME: 43.46 CM3
LEFT INTERNAL DIMENSION IN SYSTOLE: 2.37 CM (ref 2.1–4)
LEFT VENTRICLE DIASTOLIC VOLUME INDEX: 25.02 ML/M2
LEFT VENTRICLE DIASTOLIC VOLUME: 56.8 ML
LEFT VENTRICLE MASS INDEX: 39 G/M2
LEFT VENTRICLE SYSTOLIC VOLUME INDEX: 8.6 ML/M2
LEFT VENTRICLE SYSTOLIC VOLUME: 19.45 ML
LEFT VENTRICULAR INTERNAL DIMENSION IN DIASTOLE: 3.66 CM (ref 3.5–6)
LEFT VENTRICULAR MASS: 87.93 G
LEUKOCYTE ESTERASE UR QL STRIP: ABNORMAL
LV LATERAL E/E' RATIO: 6.38 M/S
LV SEPTAL E/E' RATIO: 6.38 M/S
LYMPHOCYTES # BLD AUTO: 0.4 K/UL (ref 1–4.8)
LYMPHOCYTES # BLD AUTO: 0.4 K/UL (ref 1–4.8)
LYMPHOCYTES NFR BLD: 11 % (ref 18–48)
LYMPHOCYTES NFR BLD: 9.6 % (ref 18–48)
MAGNESIUM SERPL-MCNC: 2.1 MG/DL (ref 1.6–2.6)
MAGNESIUM SERPL-MCNC: 2.2 MG/DL (ref 1.6–2.6)
MCH RBC QN AUTO: 26.9 PG (ref 27–31)
MCH RBC QN AUTO: 27.1 PG (ref 27–31)
MCHC RBC AUTO-ENTMCNC: 31.2 G/DL (ref 32–36)
MCHC RBC AUTO-ENTMCNC: 31.6 G/DL (ref 32–36)
MCV RBC AUTO: 85 FL (ref 82–98)
MCV RBC AUTO: 87 FL (ref 82–98)
METHADONE UR QL SCN>300 NG/ML: NEGATIVE
MICROSCOPIC COMMENT: ABNORMAL
MIN VOL: 10.8
MODE: ABNORMAL
MONOCYTES # BLD AUTO: 0.3 K/UL (ref 0.3–1)
MONOCYTES # BLD AUTO: 0.4 K/UL (ref 0.3–1)
MONOCYTES NFR BLD: 10.1 % (ref 4–15)
MONOCYTES NFR BLD: 7.8 % (ref 4–15)
MV PEAK A VEL: 0.78 M/S
MV PEAK E VEL: 0.51 M/S
MV STENOSIS PRESSURE HALF TIME: 67.14 MS
MV VALVE AREA P 1/2 METHOD: 3.28 CM2
NEUTROPHILS # BLD AUTO: 2.7 K/UL (ref 1.8–7.7)
NEUTROPHILS # BLD AUTO: 2.9 K/UL (ref 1.8–7.7)
NEUTROPHILS NFR BLD: 79.5 % (ref 38–73)
NEUTROPHILS NFR BLD: 79.7 % (ref 38–73)
NITRITE UR QL STRIP: POSITIVE
NRBC BLD-RTO: 0 /100 WBC
NRBC BLD-RTO: 0 /100 WBC
OHS QRS DURATION: 82 MS
OHS QRS DURATION: 86 MS
OHS QTC CALCULATION: 455 MS
OHS QTC CALCULATION: 464 MS
OPIATES UR QL SCN: ABNORMAL
PCO2 BLDA: 28.1 MMHG (ref 35–45)
PCO2 BLDA: 28.5 MMHG (ref 35–45)
PCO2 BLDA: 29 MMHG (ref 35–45)
PCO2 BLDA: 38.9 MMHG (ref 35–45)
PCO2 BLDA: 52.7 MMHG (ref 35–45)
PCO2 BLDA: 58.2 MMHG (ref 35–45)
PCP UR QL SCN>25 NG/ML: NEGATIVE
PEEP: 5
PH SMN: 7.31 [PH] (ref 7.35–7.45)
PH SMN: 7.36 [PH] (ref 7.35–7.45)
PH SMN: 7.5 [PH] (ref 7.35–7.45)
PH SMN: 7.51 [PH] (ref 7.35–7.45)
PH SMN: 7.53 [PH] (ref 7.35–7.45)
PH SMN: 7.56 [PH] (ref 7.35–7.45)
PH UR STRIP: 6 [PH] (ref 5–8)
PHOSPHATE SERPL-MCNC: 3.4 MG/DL (ref 2.7–4.5)
PIP: 20
PISA TR MAX VEL: 2.2 M/S
PLATELET # BLD AUTO: 120 K/UL (ref 150–450)
PLATELET # BLD AUTO: 128 K/UL (ref 150–450)
PMV BLD AUTO: 11.2 FL (ref 9.2–12.9)
PMV BLD AUTO: 12.3 FL (ref 9.2–12.9)
PO2 BLDA: 127 MMHG (ref 80–100)
PO2 BLDA: 52 MMHG (ref 40–60)
PO2 BLDA: 52 MMHG (ref 40–60)
PO2 BLDA: 56 MMHG (ref 40–60)
PO2 BLDA: 60 MMHG (ref 40–60)
PO2 BLDA: 72 MMHG (ref 40–60)
POC BE: 0 MMOL/L
POC BE: 1 MMOL/L
POC BE: 3 MMOL/L
POC BE: 3 MMOL/L
POC BE: 4 MMOL/L
POC BE: 7 MMOL/L
POC IONIZED CALCIUM: 0.81 MMOL/L (ref 1.06–1.42)
POC IONIZED CALCIUM: 1.31 MMOL/L (ref 1.06–1.42)
POC IONIZED CALCIUM: 1.31 MMOL/L (ref 1.06–1.42)
POC SATURATED O2: 82 % (ref 95–100)
POC SATURATED O2: 89 % (ref 95–100)
POC SATURATED O2: 91 % (ref 95–100)
POC SATURATED O2: 92 % (ref 95–100)
POC SATURATED O2: 96 % (ref 95–100)
POC SATURATED O2: 99 % (ref 95–100)
POC TCO2 (MEASURED): 14 MMOL/L (ref 23–27)
POC TCO2 (MEASURED): 31 MMOL/L (ref 23–29)
POC TCO2: 24 MMOL/L (ref 24–29)
POC TCO2: 25 MMOL/L (ref 23–27)
POC TCO2: 26 MMOL/L (ref 24–29)
POC TCO2: 31 MMOL/L (ref 24–29)
POCT GLUCOSE: 116 MG/DL (ref 70–110)
POCT GLUCOSE: 121 MG/DL (ref 70–110)
POCT GLUCOSE: 125 MG/DL (ref 70–110)
POCT GLUCOSE: 134 MG/DL (ref 70–110)
POCT GLUCOSE: 142 MG/DL (ref 70–110)
POCT GLUCOSE: 161 MG/DL (ref 70–110)
POCT GLUCOSE: 164 MG/DL (ref 70–110)
POCT GLUCOSE: 169 MG/DL (ref 70–110)
POCT GLUCOSE: 183 MG/DL (ref 70–110)
POCT GLUCOSE: 233 MG/DL (ref 70–110)
POCT GLUCOSE: 52 MG/DL (ref 70–110)
POCT GLUCOSE: 66 MG/DL (ref 70–110)
POCT GLUCOSE: 72 MG/DL (ref 70–110)
POCT GLUCOSE: 83 MG/DL (ref 70–110)
POCT GLUCOSE: 98 MG/DL (ref 70–110)
POTASSIUM BLD-SCNC: 2.3 MMOL/L (ref 3.5–5.1)
POTASSIUM BLD-SCNC: 4.2 MMOL/L (ref 3.5–5.1)
POTASSIUM BLD-SCNC: 4.2 MMOL/L (ref 3.5–5.1)
POTASSIUM SERPL-SCNC: 4.3 MMOL/L (ref 3.5–5.1)
POTASSIUM SERPL-SCNC: 4.4 MMOL/L (ref 3.5–5.1)
PROT SERPL-MCNC: 5.7 G/DL (ref 6–8.4)
PROT SERPL-MCNC: 6.6 G/DL (ref 6–8.4)
PROT UR QL STRIP: ABNORMAL
RA MAJOR: 4.14 CM
RA PRESSURE ESTIMATED: 3 MMHG
RA WIDTH: 3.22 CM
RBC # BLD AUTO: 2.88 M/UL (ref 4.6–6.2)
RBC # BLD AUTO: 3.23 M/UL (ref 4.6–6.2)
RBC #/AREA URNS AUTO: 94 /HPF (ref 0–4)
RIGHT VENTRICULAR END-DIASTOLIC DIMENSION: 3.18 CM
RSV AG BY MOLECULAR METHOD: NOT DETECTED
RV TB RVSP: 5 MMHG
SAMPLE: ABNORMAL
SAMPLE: NORMAL
SARS-COV-2 RNA RESP QL NAA+PROBE: NOT DETECTED
SATURATED IRON: 33 % (ref 20–50)
SINUS: 2.82 CM
SITE: ABNORMAL
SITE: NORMAL
SODIUM BLD-SCNC: 142 MMOL/L (ref 136–145)
SODIUM BLD-SCNC: 143 MMOL/L (ref 136–145)
SODIUM BLD-SCNC: 150 MMOL/L (ref 136–145)
SODIUM SERPL-SCNC: 140 MMOL/L (ref 136–145)
SODIUM SERPL-SCNC: 141 MMOL/L (ref 136–145)
SP GR UR STRIP: 1.02 (ref 1–1.03)
SP02: 100
STJ: 2.39 CM
T4 FREE SERPL-MCNC: 0.72 NG/DL (ref 0.71–1.51)
TDI LATERAL: 0.08 M/S
TDI SEPTAL: 0.08 M/S
TDI: 0.08 M/S
TOTAL IRON BINDING CAPACITY: 167 UG/DL (ref 250–450)
TOXICOLOGY INFORMATION: ABNORMAL
TR MAX PG: 19 MMHG
TRANSFERRIN SERPL-MCNC: 113 MG/DL (ref 200–375)
TRICUSPID ANNULAR PLANE SYSTOLIC EXCURSION: 1.26 CM
TSH SERPL DL<=0.005 MIU/L-ACNC: 4.98 UIU/ML (ref 0.4–4)
TV REST PULMONARY ARTERY PRESSURE: 22 MMHG
URN SPEC COLLECT METH UR: ABNORMAL
VIT B12 SERPL-MCNC: 582 PG/ML (ref 210–950)
VT: 480
WBC # BLD AUTO: 3.44 K/UL (ref 3.9–12.7)
WBC # BLD AUTO: 3.65 K/UL (ref 3.9–12.7)
WBC #/AREA URNS AUTO: >100 /HPF (ref 0–5)
WBC CLUMPS UR QL AUTO: ABNORMAL
Z-SCORE OF LEFT VENTRICULAR DIMENSION IN END DIASTOLE: -8.35
Z-SCORE OF LEFT VENTRICULAR DIMENSION IN END SYSTOLE: -6.06

## 2024-03-04 PROCEDURE — 80053 COMPREHEN METABOLIC PANEL: CPT | Mod: 91

## 2024-03-04 PROCEDURE — 93005 ELECTROCARDIOGRAM TRACING: CPT

## 2024-03-04 PROCEDURE — 84132 ASSAY OF SERUM POTASSIUM: CPT

## 2024-03-04 PROCEDURE — 84100 ASSAY OF PHOSPHORUS: CPT

## 2024-03-04 PROCEDURE — 63600175 PHARM REV CODE 636 W HCPCS

## 2024-03-04 PROCEDURE — 82803 BLOOD GASES ANY COMBINATION: CPT

## 2024-03-04 PROCEDURE — 20000000 HC ICU ROOM

## 2024-03-04 PROCEDURE — 0BH17EZ INSERTION OF ENDOTRACHEAL AIRWAY INTO TRACHEA, VIA NATURAL OR ARTIFICIAL OPENING: ICD-10-PCS | Performed by: INTERNAL MEDICINE

## 2024-03-04 PROCEDURE — 25000003 PHARM REV CODE 250

## 2024-03-04 PROCEDURE — 83540 ASSAY OF IRON: CPT

## 2024-03-04 PROCEDURE — 87389 HIV-1 AG W/HIV-1&-2 AB AG IA: CPT | Performed by: PHYSICIAN ASSISTANT

## 2024-03-04 PROCEDURE — 83735 ASSAY OF MAGNESIUM: CPT | Performed by: EMERGENCY MEDICINE

## 2024-03-04 PROCEDURE — 83605 ASSAY OF LACTIC ACID: CPT

## 2024-03-04 PROCEDURE — 25000003 PHARM REV CODE 250: Performed by: EMERGENCY MEDICINE

## 2024-03-04 PROCEDURE — 99900035 HC TECH TIME PER 15 MIN (STAT)

## 2024-03-04 PROCEDURE — 63600175 PHARM REV CODE 636 W HCPCS: Performed by: INTERNAL MEDICINE

## 2024-03-04 PROCEDURE — 99900026 HC AIRWAY MAINTENANCE (STAT)

## 2024-03-04 PROCEDURE — 5A1955Z RESPIRATORY VENTILATION, GREATER THAN 96 CONSECUTIVE HOURS: ICD-10-PCS | Performed by: INTERNAL MEDICINE

## 2024-03-04 PROCEDURE — 94799 UNLISTED PULMONARY SVC/PX: CPT

## 2024-03-04 PROCEDURE — 84439 ASSAY OF FREE THYROXINE: CPT

## 2024-03-04 PROCEDURE — 84295 ASSAY OF SERUM SODIUM: CPT

## 2024-03-04 PROCEDURE — 99291 CRITICAL CARE FIRST HOUR: CPT

## 2024-03-04 PROCEDURE — 82607 VITAMIN B-12: CPT

## 2024-03-04 PROCEDURE — 87088 URINE BACTERIA CULTURE: CPT | Performed by: EMERGENCY MEDICINE

## 2024-03-04 PROCEDURE — 94761 N-INVAS EAR/PLS OXIMETRY MLT: CPT | Mod: XB

## 2024-03-04 PROCEDURE — 85025 COMPLETE CBC W/AUTO DIFF WBC: CPT | Mod: 91

## 2024-03-04 PROCEDURE — 82728 ASSAY OF FERRITIN: CPT

## 2024-03-04 PROCEDURE — 80053 COMPREHEN METABOLIC PANEL: CPT | Performed by: EMERGENCY MEDICINE

## 2024-03-04 PROCEDURE — 82330 ASSAY OF CALCIUM: CPT

## 2024-03-04 PROCEDURE — 81001 URINALYSIS AUTO W/SCOPE: CPT | Mod: XB | Performed by: EMERGENCY MEDICINE

## 2024-03-04 PROCEDURE — 36600 WITHDRAWAL OF ARTERIAL BLOOD: CPT

## 2024-03-04 PROCEDURE — 94002 VENT MGMT INPAT INIT DAY: CPT

## 2024-03-04 PROCEDURE — 87086 URINE CULTURE/COLONY COUNT: CPT | Performed by: EMERGENCY MEDICINE

## 2024-03-04 PROCEDURE — 85025 COMPLETE CBC W/AUTO DIFF WBC: CPT | Performed by: EMERGENCY MEDICINE

## 2024-03-04 PROCEDURE — 80307 DRUG TEST PRSMV CHEM ANLYZR: CPT

## 2024-03-04 PROCEDURE — 82550 ASSAY OF CK (CPK): CPT

## 2024-03-04 PROCEDURE — 84443 ASSAY THYROID STIM HORMONE: CPT

## 2024-03-04 PROCEDURE — 51702 INSERT TEMP BLADDER CATH: CPT

## 2024-03-04 PROCEDURE — 27100171 HC OXYGEN HIGH FLOW UP TO 24 HOURS

## 2024-03-04 PROCEDURE — 80354 DRUG SCREENING FENTANYL: CPT

## 2024-03-04 PROCEDURE — 87040 BLOOD CULTURE FOR BACTERIA: CPT | Mod: 59 | Performed by: EMERGENCY MEDICINE

## 2024-03-04 PROCEDURE — 25000003 PHARM REV CODE 250: Performed by: INTERNAL MEDICINE

## 2024-03-04 PROCEDURE — 87186 SC STD MICRODIL/AGAR DIL: CPT | Performed by: EMERGENCY MEDICINE

## 2024-03-04 PROCEDURE — 63600175 PHARM REV CODE 636 W HCPCS: Performed by: STUDENT IN AN ORGANIZED HEALTH CARE EDUCATION/TRAINING PROGRAM

## 2024-03-04 PROCEDURE — 83735 ASSAY OF MAGNESIUM: CPT | Mod: 91

## 2024-03-04 PROCEDURE — 87077 CULTURE AEROBIC IDENTIFY: CPT | Performed by: EMERGENCY MEDICINE

## 2024-03-04 PROCEDURE — 0241U SARS-COV2 (COVID) WITH FLU/RSV BY PCR: CPT

## 2024-03-04 PROCEDURE — 82800 BLOOD PH: CPT

## 2024-03-04 PROCEDURE — 82746 ASSAY OF FOLIC ACID SERUM: CPT

## 2024-03-04 RX ORDER — PHENYLEPHRINE HCL IN 0.9% NACL 1 MG/10 ML
SYRINGE (ML) INTRAVENOUS
Status: DISPENSED
Start: 2024-03-04 | End: 2024-03-04

## 2024-03-04 RX ORDER — TAMSULOSIN HYDROCHLORIDE 0.4 MG/1
0.4 CAPSULE ORAL DAILY
Status: DISCONTINUED | OUTPATIENT
Start: 2024-03-04 | End: 2024-03-05

## 2024-03-04 RX ORDER — LORAZEPAM 2 MG/ML
INJECTION INTRAMUSCULAR
Status: COMPLETED
Start: 2024-03-04 | End: 2024-03-04

## 2024-03-04 RX ORDER — IBUPROFEN 200 MG
16 TABLET ORAL
Status: DISCONTINUED | OUTPATIENT
Start: 2024-03-04 | End: 2024-03-05

## 2024-03-04 RX ORDER — ETOMIDATE 2 MG/ML
INJECTION INTRAVENOUS
Status: DISPENSED
Start: 2024-03-04 | End: 2024-03-05

## 2024-03-04 RX ORDER — PANTOPRAZOLE SODIUM 40 MG/1
40 TABLET, DELAYED RELEASE ORAL DAILY
Status: DISCONTINUED | OUTPATIENT
Start: 2024-03-04 | End: 2024-03-05

## 2024-03-04 RX ORDER — SODIUM CHLORIDE 0.9 % (FLUSH) 0.9 %
10 SYRINGE (ML) INJECTION EVERY 12 HOURS PRN
Status: DISCONTINUED | OUTPATIENT
Start: 2024-03-04 | End: 2024-03-27 | Stop reason: HOSPADM

## 2024-03-04 RX ORDER — IBUPROFEN 200 MG
24 TABLET ORAL
Status: DISCONTINUED | OUTPATIENT
Start: 2024-03-04 | End: 2024-03-05

## 2024-03-04 RX ORDER — LORAZEPAM 2 MG/ML
4 INJECTION INTRAMUSCULAR ONCE
Status: COMPLETED | OUTPATIENT
Start: 2024-03-04 | End: 2024-03-04

## 2024-03-04 RX ORDER — ENOXAPARIN SODIUM 100 MG/ML
40 INJECTION SUBCUTANEOUS EVERY 24 HOURS
Status: DISCONTINUED | OUTPATIENT
Start: 2024-03-04 | End: 2024-03-05

## 2024-03-04 RX ORDER — QUETIAPINE FUMARATE 25 MG/1
25 TABLET, FILM COATED ORAL NIGHTLY
Status: DISCONTINUED | OUTPATIENT
Start: 2024-03-04 | End: 2024-03-04

## 2024-03-04 RX ORDER — NALOXONE HCL 0.4 MG/ML
0.02 VIAL (ML) INJECTION
Status: DISCONTINUED | OUTPATIENT
Start: 2024-03-04 | End: 2024-03-27 | Stop reason: HOSPADM

## 2024-03-04 RX ORDER — ATROPINE SULFATE 0.1 MG/ML
1 INJECTION INTRAVENOUS ONCE
Status: COMPLETED | OUTPATIENT
Start: 2024-03-04 | End: 2024-03-04

## 2024-03-04 RX ORDER — ROCURONIUM BROMIDE 10 MG/ML
INJECTION, SOLUTION INTRAVENOUS
Status: COMPLETED
Start: 2024-03-04 | End: 2024-03-04

## 2024-03-04 RX ORDER — ETOMIDATE 2 MG/ML
20 INJECTION INTRAVENOUS ONCE
Status: DISCONTINUED | OUTPATIENT
Start: 2024-03-04 | End: 2024-03-04

## 2024-03-04 RX ORDER — INSULIN ASPART 100 [IU]/ML
0-5 INJECTION, SOLUTION INTRAVENOUS; SUBCUTANEOUS
Status: DISCONTINUED | OUTPATIENT
Start: 2024-03-04 | End: 2024-03-16

## 2024-03-04 RX ORDER — ATROPINE SULFATE 0.1 MG/ML
1 INJECTION INTRAVENOUS ONCE
Status: DISCONTINUED | OUTPATIENT
Start: 2024-03-04 | End: 2024-03-04

## 2024-03-04 RX ORDER — ROCURONIUM BROMIDE 10 MG/ML
100 INJECTION, SOLUTION INTRAVENOUS ONCE
Status: COMPLETED | OUTPATIENT
Start: 2024-03-04 | End: 2024-03-04

## 2024-03-04 RX ORDER — PROPOFOL 10 MG/ML
0-50 INJECTION, EMULSION INTRAVENOUS CONTINUOUS
Status: DISCONTINUED | OUTPATIENT
Start: 2024-03-04 | End: 2024-03-06

## 2024-03-04 RX ORDER — HYDROMORPHONE HYDROCHLORIDE 1 MG/ML
0.5 INJECTION, SOLUTION INTRAMUSCULAR; INTRAVENOUS; SUBCUTANEOUS ONCE
Status: DISCONTINUED | OUTPATIENT
Start: 2024-03-04 | End: 2024-03-04

## 2024-03-04 RX ORDER — LORAZEPAM 2 MG/ML
2 INJECTION INTRAMUSCULAR ONCE
Status: COMPLETED | OUTPATIENT
Start: 2024-03-04 | End: 2024-03-04

## 2024-03-04 RX ORDER — GLUCAGON 1 MG
1 KIT INJECTION
Status: DISCONTINUED | OUTPATIENT
Start: 2024-03-04 | End: 2024-03-27 | Stop reason: HOSPADM

## 2024-03-04 RX ORDER — ATORVASTATIN CALCIUM 40 MG/1
40 TABLET, FILM COATED ORAL DAILY
Status: DISCONTINUED | OUTPATIENT
Start: 2024-03-04 | End: 2024-03-05

## 2024-03-04 RX ORDER — PROPOFOL 10 MG/ML
INJECTION, EMULSION INTRAVENOUS
Status: COMPLETED
Start: 2024-03-04 | End: 2024-03-04

## 2024-03-04 RX ORDER — LORAZEPAM 2 MG/ML
2 INJECTION INTRAMUSCULAR ONCE
Status: DISCONTINUED | OUTPATIENT
Start: 2024-03-04 | End: 2024-03-04

## 2024-03-04 RX ORDER — DEXTROSE 50 % IN WATER (D50W) INTRAVENOUS SYRINGE
25
Status: COMPLETED | OUTPATIENT
Start: 2024-03-04 | End: 2024-03-04

## 2024-03-04 RX ORDER — NOREPINEPHRINE BITARTRATE/D5W 4MG/250ML
0-3 PLASTIC BAG, INJECTION (ML) INTRAVENOUS CONTINUOUS
Status: DISCONTINUED | OUTPATIENT
Start: 2024-03-04 | End: 2024-03-10

## 2024-03-04 RX ORDER — DEXTROSE MONOHYDRATE 100 MG/ML
INJECTION, SOLUTION INTRAVENOUS CONTINUOUS
Status: DISCONTINUED | OUTPATIENT
Start: 2024-03-04 | End: 2024-03-06

## 2024-03-04 RX ADMIN — PIPERACILLIN SODIUM AND TAZOBACTAM SODIUM 4.5 G: 4; .5 INJECTION, POWDER, FOR SOLUTION INTRAVENOUS at 12:03

## 2024-03-04 RX ADMIN — NALXONE HYDROCHLORIDE 0.4 MG: 0.4 INJECTION INTRAMUSCULAR; INTRAVENOUS; SUBCUTANEOUS at 11:03

## 2024-03-04 RX ADMIN — PIPERACILLIN SODIUM AND TAZOBACTAM SODIUM 4.5 G: 4; .5 INJECTION, POWDER, FOR SOLUTION INTRAVENOUS at 07:03

## 2024-03-04 RX ADMIN — LORAZEPAM 4 MG: 2 INJECTION INTRAMUSCULAR at 01:03

## 2024-03-04 RX ADMIN — PROPOFOL 15 MCG/KG/MIN: 10 INJECTION, EMULSION INTRAVENOUS at 09:03

## 2024-03-04 RX ADMIN — NOREPINEPHRINE BITARTRATE 0.04 MCG/KG/MIN: 4 INJECTION, SOLUTION INTRAVENOUS at 04:03

## 2024-03-04 RX ADMIN — DEXTROSE MONOHYDRATE: 100 INJECTION, SOLUTION INTRAVENOUS at 05:03

## 2024-03-04 RX ADMIN — NALXONE HYDROCHLORIDE 0.4 MG: 0.4 INJECTION INTRAMUSCULAR; INTRAVENOUS; SUBCUTANEOUS at 10:03

## 2024-03-04 RX ADMIN — LORAZEPAM 4 MG: 2 INJECTION INTRAMUSCULAR; INTRAVENOUS at 01:03

## 2024-03-04 RX ADMIN — LORAZEPAM 2 MG: 2 INJECTION INTRAMUSCULAR; INTRAVENOUS at 05:03

## 2024-03-04 RX ADMIN — ATROPINE SULFATE 1 MG: 0.1 INJECTION INTRAVENOUS at 05:03

## 2024-03-04 RX ADMIN — PROPOFOL 5 MCG/KG/MIN: 10 INJECTION, EMULSION INTRAVENOUS at 12:03

## 2024-03-04 RX ADMIN — SODIUM CHLORIDE 1000 ML: 0.9 INJECTION, SOLUTION INTRAVENOUS at 03:03

## 2024-03-04 RX ADMIN — LORAZEPAM 2 MG: 2 INJECTION INTRAMUSCULAR at 05:03

## 2024-03-04 RX ADMIN — NOREPINEPHRINE BITARTRATE 0.1 MCG/KG/MIN: 4 INJECTION, SOLUTION INTRAVENOUS at 05:03

## 2024-03-04 RX ADMIN — ENOXAPARIN SODIUM 40 MG: 40 INJECTION SUBCUTANEOUS at 06:03

## 2024-03-04 RX ADMIN — DEXTROSE MONOHYDRATE: 100 INJECTION, SOLUTION INTRAVENOUS at 07:03

## 2024-03-04 RX ADMIN — VANCOMYCIN HYDROCHLORIDE 2000 MG: 500 INJECTION, POWDER, LYOPHILIZED, FOR SOLUTION INTRAVENOUS at 05:03

## 2024-03-04 RX ADMIN — ROCURONIUM BROMIDE 100 MG: 10 INJECTION INTRAVENOUS at 01:03

## 2024-03-04 RX ADMIN — DEXTROSE MONOHYDRATE 25 G: 25 INJECTION, SOLUTION INTRAVENOUS at 04:03

## 2024-03-04 RX ADMIN — NOREPINEPHRINE BITARTRATE 0.1 MCG/KG/MIN: 4 INJECTION, SOLUTION INTRAVENOUS at 10:03

## 2024-03-04 RX ADMIN — DEXTROSE MONOHYDRATE: 100 INJECTION, SOLUTION INTRAVENOUS at 12:03

## 2024-03-04 RX ADMIN — NOREPINEPHRINE BITARTRATE 0.05 MCG/KG/MIN: 4 INJECTION, SOLUTION INTRAVENOUS at 12:03

## 2024-03-04 RX ADMIN — ROCURONIUM BROMIDE 100 MG: 10 INJECTION, SOLUTION INTRAVENOUS at 01:03

## 2024-03-04 RX ADMIN — SODIUM CHLORIDE, POTASSIUM CHLORIDE, SODIUM LACTATE AND CALCIUM CHLORIDE 1000 ML: 600; 310; 30; 20 INJECTION, SOLUTION INTRAVENOUS at 05:03

## 2024-03-04 RX ADMIN — PIPERACILLIN SODIUM AND TAZOBACTAM SODIUM 4.5 G: 4; .5 INJECTION, POWDER, FOR SOLUTION INTRAVENOUS at 04:03

## 2024-03-04 NOTE — ED NOTES
Pt's daughter Joseph, updated about pt care and said to call in case of an emergency. Phone number in chart.

## 2024-03-04 NOTE — PROGRESS NOTES
"Pharmacokinetic Initial Assessment: IV Vancomycin    Assessment/Plan:    Initiate intravenous vancomycin with loading dose of 2000 mg once followed by a maintenance dose of vancomycin 1250 mg IV every 12 hours  Desired empiric serum trough concentration is 15 to 20 mcg/mL  Draw vancomycin trough level 60 min prior to fourth dose on 3/5 at approximately 1600  Pharmacy will continue to follow and monitor vancomycin.      Please contact pharmacy at extension [phone] with any questions regarding this assessment.     Thank you for the consult,   Alisa Calvert       Patient brief summary:  Bj Pate Jr. is a 74 y.o. male initiated on antimicrobial therapy with IV Vancomycin for treatment of suspected sepsis    Drug Allergies:   Review of patient's allergies indicates:   Allergen Reactions    Tomato (solanum lycopersicum) Hives    Naproxen Hives    Shrimp Other (See Comments)       Actual Body Weight:   119.3 kg    Renal Function:   Estimated Creatinine Clearance: 94.5 mL/min (based on SCr of 0.9 mg/dL).    Dialysis Method (if applicable):  N/A    CBC (last 72 hours):  Recent Labs   Lab Result Units 03/04/24  0214   WBC K/uL 3.65*   Hemoglobin g/dL 8.7*   Hematocrit % 27.5*   Platelets K/uL 128*   Gran % % 79.5*   Lymph % % 9.6*   Mono % % 10.1   Eosinophil % % 0.8   Basophil % % 0.0   Differential Method  Automated       Metabolic Panel (last 72 hours):  Recent Labs   Lab Result Units 03/04/24  0214   Sodium mmol/L 140   Potassium mmol/L 4.4   Chloride mmol/L 109   CO2 mmol/L 24   Glucose mg/dL 68*   BUN mg/dL 22   Creatinine mg/dL 0.9   Albumin g/dL 2.0*   Total Bilirubin mg/dL 0.2   Alkaline Phosphatase U/L 103   AST U/L 23   ALT U/L 17   Magnesium mg/dL 2.2       Drug levels (last 3 results):  No results for input(s): "VANCOMYCINRA", "VANCORANDOM", "VANCOMYCINPE", "VANCOPEAK", "VANCOMYCINTR", "VANCOTROUGH" in the last 72 hours.    Microbiologic Results:  Microbiology Results (last 7 days)       Procedure Component " Value Units Date/Time    Blood culture x two cultures. Draw prior to antibiotics. [[id number]] Collected: 03/04/24 0214    Order Status: Sent Specimen: Blood from Peripheral, Forearm, Left Updated: 03/04/24 0228    Blood culture x two cultures. Draw prior to antibiotics. [[id number]] Collected: 03/04/24 0214    Order Status: Sent Specimen: Blood from Peripheral, Forearm, Left Updated: 03/04/24 0228

## 2024-03-04 NOTE — ASSESSMENT & PLAN NOTE
This patient does have evidence of infective focus  My overall impression is sepsis.  Source: Urinary Tract  Antibiotics given-   Antibiotics (72h ago, onward)      Start     Stop Route Frequency Ordered    03/04/24 1700  vancomycin 1,250 mg in dextrose 5 % (D5W) 250 mL IVPB (Vial-Mate)         -- IV Every 12 hours (non-standard times) 03/04/24 0406    03/04/24 0500  vancomycin 2 g in dextrose 5 % 500 mL IVPB         -- IV Once 03/04/24 0404    03/04/24 0430  piperacillin-tazobactam (ZOSYN) 4.5 g in dextrose 5 % in water (D5W) 100 mL IVPB (MB+)         -- IV Every 8 hours (non-standard times) 03/04/24 0320    03/04/24 0420  vancomycin - pharmacy to dose  (vancomycin IVPB (PEDS and ADULTS))        See Hyperspace for full Linked Orders Report.    -- IV pharmacy to manage frequency 03/04/24 0320          Latest lactate reviewed-  Recent Labs   Lab 03/04/24  0224 03/04/24  0402   LACTATE  --  1.0   POCLAC 1.09  --      Organ dysfunction indicated by Encephalopathy    Fluid challenge Ideal Body Weight- The patient's ideal body weight is Ideal body weight: 70.7 kg (155 lb 13.8 oz) which will be used to calculate fluid bolus of 30 ml/kg for treatment of septic shock.      Post- resuscitation assessment No - Post resuscitation assessment not needed       Will Not start Pressors- Levophed for MAP of 65  Source control achieved by: IV abx    74M with hx CVA, osteomyelitis s/p neurosurgical intervention presents to [facility] from SNF for AMS x 2 days. Found to be hypothermic and hypoglycemic with intermittent readings of hypotension to MAP 55. Lactic acid 1. Suspect urinary source given cloudy urine draining from Rivera on presentation. S/p 1L warmed IVF, started on Vanc/Zosyn.    -continue Vanc/Zosyn  -complete sepsis fluids  -f/u blood cx, urine studies  -d10 gtt for hypoglycemia  -nikki hugger for hypothermia

## 2024-03-04 NOTE — PROGRESS NOTES
Patient intubated with an 8.0 ETT @26 cm jinny @ the lips. Pt placed on a  ventilator. Ambu and mask at bedside, HOB at 30 degree angle, vent plugged into the emergency outlet. Will continue to monitor.

## 2024-03-04 NOTE — CONSULTS
Consult received. Patient to be admitted to MICU. Please see H&P 03/04.    Nitesh Ricks MD  [facility] PGY-2

## 2024-03-04 NOTE — PLAN OF CARE
Cardiac ICU Care Plan    POC reviewed with Bj Pate Jr. and family. Questions and concerns addressed. No acute events today. Pt progressing toward goals. Will continue to monitor. See below and flowsheets for full assessment and VS info.       Neuro:  Jaja Coma Scale  Best Eye Response: 2-->(E2) to pain  Best Motor Response: 3-->(M3) flexion to pain  Best Verbal Response: 1-->(V1) none  [place] Coma Scale Score: 6  Assessment Qualifiers: patient chemically sedated or paralyzed, patient intubated  Pupil PERRLA: yes    24 hr Temp:  [87.9 °F (31.1 °C)-97.3 °F (36.3 °C)]      CV:  Rhythm: normal sinus rhythm   DVT prophylaxis: VTE Required Core Measure: Pharmacological prophylaxis initiated/maintained                            Pulses  Right Radial Pulse: 2+ (normal)  Left Radial Pulse: 2+ (normal)  Right Dorsalis Pedis Pulse: 2+ (normal)  Left Dorsalis Pedis Pulse: 2+ (normal)  Right Posterior Tibial Pulse: 1+ (weak)  Left Posterior Tibial Pulse: 1+ (weak)    Resp:     Vent Mode: A/C  Set Rate: 22 BPM  Oxygen Concentration (%): 40  Vt Set: 480 mL  PEEP/CPAP: 5 cmH20    GI/:  GI prophylaxis: no  Diet/Nutrition Received: NPO  Last Bowel Movement:  (unknown)  Voiding Characteristics: urethral catheter (bladder)       Urethral Catheter 03/04/24 0249 Silicone 16 Fr.-Reason for Continuing Urinary Catheterization: Critically ill in ICU and requiring hourly monitoring of intake/output   Intake/Output Summary (Last 24 hours) at 3/4/2024 1634  Last data filed at 3/4/2024 1601  Gross per 24 hour   Intake 4969.37 ml   Output 800 ml   Net 4169.37 ml        Nutritional Supplement Intake: Quantity 0, Type:  n/a    Labs/Accuchecks:  Recent Labs   Lab 03/04/24  0214 03/04/24  0354 03/04/24  0402 03/04/24  0404 03/04/24  0406   WBC 3.65*  --  3.44*  --   --    RBC 3.23*  --  2.88*  --   --    HGB 8.7*  --  7.8*  --   --    HCT 27.5*   < > 25.0* 26* 25*   *  --  120*  --   --     < > = values in this interval not  "displayed.    No results for input(s): "PT", "INR", "APTT" in the last 168 hours.   Recent Labs     03/04/24  0402      K 4.3   CO2 26   *   BUN 20   CREATININE 0.9   ALKPHOS 91   ALT 15   AST 21   BILITOT 0.2       Recent Labs   Lab 03/04/24  0402   CPK 53      Recent Labs     03/04/24  0404 03/04/24  1030 03/04/24  1600   PH 7.360 7.313* 7.534*   PCO2 52.7* 58.2* 28.5*   PO2 60 52 127*   HCO3 29.8* 29.5* 24.1   POCSATURATED 89 82 99   BE 4* 3* 1       Electrolytes: N/A - electrolytes WDL  Accuchecks: Q1H    Gtts/LDAs:   dextrose 10 % in water (D10W) 75 mL/hr at 03/04/24 1601    NORepinephrine bitartrate-D5W 0.04 mcg/kg/min (03/04/24 1610)    propofoL 10 mcg/kg/min (03/04/24 1601)       Lines/Drains/Airways       Drain  Duration                  NG/OG Tube 03/04/24 1258 Center mouth <1 day         Urethral Catheter 03/04/24 0249 Silicone 16 Fr. <1 day              Airway  Duration                  Airway - Non-Surgical 03/04/24 1255 Endotracheal Tube <1 day              Peripheral Intravenous Line  Duration                  Peripheral IV - Single Lumen 20 G Anterior;Distal;Left Upper Arm -- days         Peripheral IV - Single Lumen 03/04/24 0215 20 G Anterior;Distal;Left Forearm <1 day         Peripheral IV - Single Lumen 03/04/24 18 G Right;Posterior Wrist <1 day                    Skin/Wounds  Bathing/Skin Care: bath, complete;dressed/undressed;incontinence care (03/04/24 0701)  Wounds: Yes  Wound care consulted: Yes   "

## 2024-03-04 NOTE — ASSESSMENT & PLAN NOTE
Pt's body temp was 87 degrees Farenheit on admission. Pt was shivering, hypoglycemia noted. S/p 1L warmed IVF, nikki hugger in place.

## 2024-03-04 NOTE — H&P
[facility] - Emergency Dept  Critical Care Medicine  History & Physical    Patient Name: Bj Pate Jr.  MRN: [id number]  Admission Date: 3/4/2024  Hospital Length of Stay: 0 days  Code Status: Full Code  Attending Physician: Dileep Davis MD   Primary Care Provider: Jose Antonio Foster MD   Principal Problem: Hypothermia    Subjective:     HPI:  73M with PMH CVA with R sided deficits, DM, HTN who presents to the ED from SNF for AMS. Per chart, he was more somnolent than usual over the last 2 days. At baseline he is verbal and follows commands but does not ambulate. On arrival to ED he was hypothermic to 87.9F and found to be hypoglycemic to the 50s. Intermittently hypotensive to MAP 55. Patient is awake but does not interact meaningfully on exam. Chronic Rivera was initially draining cloudy urine with sediment, Rivera exchanged. Initial labs without leukocytosis, CMP unremarkable, VBG with pH 7.49/30.3/38.9, lactate normal. Received 1L NS bolus through warming pump. Initially admitted to , critical care consulted for severe hypothermia with hypotension.     Hospital/ICU Course:  No notes on file     Past Medical History:   Diagnosis Date    Acute on chronic systolic heart failure 12/13/2023    Anemia 06/04/2021    Anticoagulant long-term use     Basal ganglia hemorrhage     Left basal ganglia hemorrhage with resultant right-sided hemiparesis which has resolved.     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        Past Surgical History:   Procedure Laterality Date    CARPAL TUNNEL RELEASE Left 2/19/2020    Procedure: RELEASE, CARPAL TUNNEL LEFT;  Surgeon: Maria Luisa Mccurdy MD;  Location: [facility] OR;  Service: Orthopedics;  Laterality: Left;    COLONOSCOPY N/A 7/28/2023    Procedure: COLONOSCOPY;  Surgeon: Jaylene Alvarado MD;  Location: [facility] ENDO;  Service: Endoscopy;  Laterality: N/A;    EPIDURAL STEROID INJECTION N/A 5/2/2019    Procedure: Injection, Steroid, Epidural Cervical;  Surgeon: Dariel Doan Jr., MD;  Location: [facility];  Service: Pain Management;  Laterality: N/A;  Cervical Epidural Steroid Injection     [id number]    Arrive @ 1130; ASA; Check BG    EPIDURAL STEROID INJECTION Bilateral 5/29/2019    Procedure: Lumbar Medial Branch Blocks;  Surgeon: Dariel Doan Jr., MD;  Location: [facility];  Service: Pain Management;  Laterality: Bilateral;  Bilateral Lumbar Medial Branch Blocks L3, L4, L5    [id number]  [id number]    Attive @ 1030 (11 arrival request); NO Sedation; ASA; Check BG    EPIDURAL STEROID INJECTION Bilateral 7/3/2019    Procedure: Lumbar Medial Branch Blocks;  Surgeon: Dariel Doan Jr., MD;  Location: [facility];  Service: Pain Management;  Laterality: Bilateral;  Bilateral Lumbar Medial Branch Blocks L3, L4, L5    [id number]  [id number]    Arrive @ 0930; NO Sedation; ASA; Check BG    EPIDURAL STEROID INJECTION N/A 8/7/2019    Procedure: Injection, Steroid, Epidural Cervical;  Surgeon: Dariel Doan Jr., MD;  Location: [facility];  Service: Pain Management;  Laterality: N/A;  Cervical Epidural Steroid Injection C7-T1    [id number]    Arrive @ 1115; Last ASA 7/30; Check BG    ESOPHAGOGASTRODUODENOSCOPY N/A 7/28/2023    Procedure: EGD (ESOPHAGOGASTRODUODENOSCOPY);  Surgeon: Jaylene Alvarado MD;  Location: [facility];  Service: Endoscopy;  Laterality: N/A;  inst via portal    FUSION, SPINE, LUMBAR, TLIF, POSTERIOR APPROACH, USING PEDICLE SCREW N/A  12/15/2023    Procedure: L-1 TO L-4 FUSION, SPINE, LUMBAR, TLIF, POSTERIOR APPROACH, USING PEDICLE SCREW;  Surgeon: Rolf Rincon MD;  Location: [facility] OR [address] [place] [address];  Service: Neurosurgery;  Laterality: N/A;    LEFT HEART CATHETERIZATION Left 9/21/2021    Procedure: Left heart cath 9am start, R rad access;  Surgeon: Dariel Conner MD;  Location: [facility] CATH LAB;  Service: Cardiology;  Laterality: Left;  RN PRE OP Covid NEGATIVE ON  9-20-21.  C A    MIDLINE CATHETER PLACEMENT  12/12/2023    NO PAST SURGERIES         Review of patient's allergies indicates:   Allergen Reactions    Tomato (solanum lycopersicum) Hives    Naproxen Hives    Shrimp Other (See Comments)       Family History       Problem Relation (Age of Onset)    Diabetes Paternal Grandfather    Heart disease Paternal Grandfather    Hypertension     No Known Problems Mother, Father, Sister, Brother, Maternal Aunt, Maternal Uncle, Paternal Aunt, Paternal Uncle, Maternal Grandmother, Maternal Grandfather, Paternal Grandmother          Tobacco Use    Smoking status: Never    Smokeless tobacco: Never   Substance and Sexual Activity    Alcohol use: Yes     Alcohol/week: 0.0 standard drinks of alcohol     Comment: occacionally    Drug use: No    Sexual activity: Not Currently      Review of Systems   Unable to perform ROS: Mental status change     Objective:     Vital Signs (Most Recent):  Temp: (!) 89.6 °F (32 °C) (03/04/24 0435)  Pulse: 61 (03/04/24 0435)  Resp: (!) 22 (03/04/24 0435)  BP: 105/71 (03/04/24 0435)  SpO2: 100 % (03/04/24 0435) Vital Signs (24h Range):  Temp:  [87.9 °F (31.1 °C)-89.6 °F (32 °C)] 89.6 °F (32 °C)  Pulse:  [52-69] 61  Resp:  [12-22] 22  SpO2:  [98 %-100 %] 100 %  BP: ()/(47-78) 105/71   Weight: 119.3 kg (263 lb)  Body mass index is 38.84 kg/m².    No intake or output data in the 24 hours ending 03/04/24 0444       Physical Exam  Vitals and nursing note reviewed.   Constitutional:       General: He is in acute distress.       Appearance: He is well-developed. He is ill-appearing.   HENT:      Head: Normocephalic.      Mouth/Throat:      Mouth: Mucous membranes are moist.   Eyes:      Pupils: Pupils are equal, round, and reactive to light.   Cardiovascular:      Rate and Rhythm: Tachycardia present. Rhythm irregular.   Pulmonary:      Effort: Pulmonary effort is normal.   Abdominal:      General: Abdomen is flat.      Palpations: Abdomen is soft.   Musculoskeletal:         General: No swelling.   Skin:     General: Skin is warm and dry.            Vents:     Lines/Drains/Airways       Drain  Duration                  Urethral Catheter 03/04/24 0249 Silicone 16 Fr. <1 day              Peripheral Intravenous Line  Duration                  Peripheral IV - Single Lumen 20 G Anterior;Distal;Left Upper Arm -- days         Peripheral IV - Single Lumen 03/04/24 0215 20 G Anterior;Distal;Left Forearm <1 day                  Significant Labs:    CBC/Anemia Profile:  Recent Labs   Lab 03/04/24  0214 03/04/24  0402 03/04/24  0404 03/04/24  0406   WBC 3.65* 3.44*  --   --    HGB 8.7* 7.8*  --   --    HCT 27.5* 25.0* 26* 25*   * 120*  --   --    MCV 85 87  --   --    RDW 17.0* 17.2*  --   --         Chemistries:  Recent Labs   Lab 03/04/24 0214      K 4.4      CO2 24   BUN 22   CREATININE 0.9   CALCIUM 9.1   ALBUMIN 2.0*   PROT 6.6   BILITOT 0.2   ALKPHOS 103   ALT 17   AST 23   MG 2.2       All pertinent labs within the past 24 hours have been reviewed.    Significant Imaging: I have reviewed all pertinent imaging results/findings within the past 24 hours.  Assessment/Plan:     Neuro  History of CVA (cerebrovascular accident)  Verbal and follows command at baseline, was not doing upon admission and for 2 days prior. Has right-sided deficits.     ID  Sepsis  This patient does have evidence of infective focus  My overall impression is sepsis.  Source: Urinary Tract  Antibiotics given-   Antibiotics (72h ago, onward)      Start      Stop Route Frequency Ordered    03/04/24 1700  vancomycin 1,250 mg in dextrose 5 % (D5W) 250 mL IVPB (Vial-Mate)         -- IV Every 12 hours (non-standard times) 03/04/24 0406    03/04/24 0500  vancomycin 2 g in dextrose 5 % 500 mL IVPB         -- IV Once 03/04/24 0404    03/04/24 0430  piperacillin-tazobactam (ZOSYN) 4.5 g in dextrose 5 % in water (D5W) 100 mL IVPB (MB+)         -- IV Every 8 hours (non-standard times) 03/04/24 0320    03/04/24 0420  vancomycin - pharmacy to dose  (vancomycin IVPB (PEDS and ADULTS))        See Hyperspace for full Linked Orders Report.    -- IV pharmacy to manage frequency 03/04/24 0320          Latest lactate reviewed-  Recent Labs   Lab 03/04/24  0224 03/04/24  0402   LACTATE  --  1.0   POCLAC 1.09  --      Organ dysfunction indicated by Encephalopathy    Fluid challenge Ideal Body Weight- The patient's ideal body weight is Ideal body weight: 70.7 kg (155 lb 13.8 oz) which will be used to calculate fluid bolus of 30 ml/kg for treatment of septic shock.      Post- resuscitation assessment No - Post resuscitation assessment not needed       Will Not start Pressors- Levophed for MAP of 65  Source control achieved by: IV abx    74M with hx CVA, osteomyelitis s/p neurosurgical intervention [place] [facility] [place] x 2 days. Found to be hypothermic and hypoglycemic with intermittent readings of hypotension to MAP 55. Lactic acid 1. Suspect urinary source given cloudy urine draining from Rivera on presentation. S/p 1L warmed IVF, started on Vanc/Zosyn.    -continue Vanc/Zosyn  -complete sepsis fluids  -f/u blood cx, urine studies  -d10 gtt for hypoglycemia  -nikki hugger for hypothermia    Endocrine  Hypoglycemia  Suspect hypoglycemia may be due to infection. See 'sepsis'    Other  Hypothermia  Pt's body temp was 87 degrees Farenheit on admission. Pt was shivering, hypoglycemia noted. S/p 1L warmed IVF, nikki hugger in place.        Critical Care Daily Checklist:    A: Awake: RASS  Goal/Actual Goal:    Actual:     B: Spontaneous Breathing Trial Performed?     C: SAT & SBT Coordinated?  N/a                      D: Delirium: CAM-ICU     E: Early Mobility Performed? No   F: Feeding Goal:    Status:     Current Diet Order   Procedures    Diet NPO      AS: Analgesia/Sedation No    T: Thromboembolic Prophylaxis Yes    H: HOB > 300 Yes   U: Stress Ulcer Prophylaxis (if needed) No    G: Glucose Control No    B: Bowel Function     I: Indwelling Catheter (Lines & Rivera) Necessity Yes    D: De-escalation of Antimicrobials/Pharmacotherapies Not indicated    Plan for the day/ETD Stabilize     Code Status:  Family/Goals of Care: Full Code         Critical secondary to Patient has a condition that poses threat to life and bodily function: severe hypothermia    Critical care was time spent personally by me on the following activities: development of treatment plan with patient or surrogate and bedside caregivers, discussions with consultants, evaluation of patient's response to treatment, examination of patient, ordering and performing treatments and interventions, ordering and review of laboratory studies, ordering and review of radiographic studies, pulse oximetry, re-evaluation of patient's condition. This critical care time did not overlap with that of any other provider or involve time for any procedures.     KRISTOFER MIGUEL MD  Critical Care Medicine  [facility] - [facility] Dept

## 2024-03-04 NOTE — ED PROVIDER NOTES
Encounter Date: 3/4/2024       History     Chief Complaint   Patient presents with    Altered Mental Status     From [facility], per RN at facility pt with AMS x2 days but also does not know pt's baseline. Hx of aphasia. GCS 11     This patient is a 74-year-old male Past Medical History:  12/13/2023: Acute on chronic systolic heart failure  06/04/2021: Anemia  No date: Anticoagulant long-term use  No date: Basal ganglia hemorrhage      Comment:  Left basal ganglia hemorrhage with resultant right-sided               hemiparesis which has resolved.    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   Initial accompanying reports from the nursing care facility state that the patient is nonverbal at baseline but generally is alert.  Of the last few days reports state that his mental status has declined and he appears more confused.  They report a history of UTIs and the patient does have indwelling Rivera catheter.  He has a history of CVA and is bed bound as result.  Patient has had sepsis secondary to sacral pan back wounds in the past.  Patient can not contribute any recent pertinent elements of history.      Review of patient's allergies indicates:   Allergen Reactions    Tomato (solanum lycopersicum) Hives    Naproxen Hives    Shrimp Other (See Comments)     Past Medical History:   Diagnosis  Date    Acute on chronic systolic heart failure 12/13/2023    Anemia 06/04/2021    Anticoagulant long-term use     Basal ganglia hemorrhage     Left basal ganglia hemorrhage with resultant right-sided hemiparesis which has resolved.     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      Past Surgical History:   Procedure Laterality Date    CARPAL TUNNEL RELEASE Left 2/19/2020    Procedure: RELEASE, CARPAL TUNNEL LEFT;  Surgeon: Maria Luisa Mccurdy MD;  Location: [facility] OR;  Service: Orthopedics;  Laterality: Left;    COLONOSCOPY N/A 7/28/2023    Procedure: COLONOSCOPY;  Surgeon: Jaylene Alvarado MD;  Location: [facility] ENDO;  Service: Endoscopy;  Laterality: N/A;    EPIDURAL STEROID INJECTION N/A 5/2/2019    Procedure: Injection, Steroid, Epidural Cervical;  Surgeon: Dariel Doan Jr., MD;  Location: [facility] ENDO;  Service: Pain Management;  Laterality: N/A;  Cervical Epidural Steroid Injection     [id number]    Arrive @ 1130; ASA; Check BG    EPIDURAL STEROID INJECTION Bilateral 5/29/2019    Procedure: Lumbar Medial Branch Blocks;  Surgeon: Dariel Doan Jr., MD;  Location: [facility] ENDO;  Service: Pain Management;  Laterality: Bilateral;  Bilateral Lumbar Medial Branch Blocks L3, L4, L5    [id number]  [id number]    Attive @ 1030 (11 arrival request); NO Sedation; ASA; Check BG    EPIDURAL STEROID INJECTION Bilateral 7/3/2019    Procedure: Lumbar Medial Branch Blocks;  Surgeon:  Dariel Doan Jr., MD;  Location: [facility] ENDO;  Service: Pain Management;  Laterality: Bilateral;  Bilateral Lumbar Medial Branch Blocks L3, L4, L5    [id number]  42115    Arrive @ 0930; NO Sedation; ASA; Check BG    EPIDURAL STEROID INJECTION N/A 8/7/2019    Procedure: Injection, Steroid, Epidural Cervical;  Surgeon: Dariel Doan Jr., MD;  Location: [facility] ENDO;  Service: Pain Management;  Laterality: N/A;  Cervical Epidural Steroid Injection C7-T1    [id number]    Arrive @ 1115; Last ASA 7/30; Check BG    ESOPHAGOGASTRODUODENOSCOPY N/A 7/28/2023    Procedure: EGD (ESOPHAGOGASTRODUODENOSCOPY);  Surgeon: Jaylene Alvarado MD;  Location: [facility] ENDO;  Service: Endoscopy;  Laterality: N/A;  inst via portal    FUSION, SPINE, LUMBAR, TLIF, POSTERIOR APPROACH, USING PEDICLE SCREW N/A 12/15/2023    Procedure: L-1 TO L-4 FUSION, SPINE, LUMBAR, TLIF, POSTERIOR APPROACH, USING PEDICLE SCREW;  Surgeon: Rolf Rincon MD;  Location: [facility] OR [address] [place] [address];  Service: Neurosurgery;  Laterality: N/A;    LEFT HEART CATHETERIZATION Left 9/21/2021    Procedure: Left heart cath 9am start, R rad access;  Surgeon: Dariel Conner MD;  Location: [facility] CATH LAB;  Service: Cardiology;  Laterality: Left;  RN PRE OP Covid NEGATIVE ON  9-20-21.  C A    MIDLINE CATHETER PLACEMENT  12/12/2023    NO PAST SURGERIES       Family History   Problem Relation Age of Onset    No Known Problems Mother     No Known Problems Father     Hypertension Unknown     Diabetes Unknown     Diabetes Paternal Grandfather     Heart disease Paternal Grandfather     No Known Problems Sister     No Known Problems Brother     No Known Problems Maternal Aunt     No Known Problems Maternal Uncle     No Known Problems Paternal Aunt     No Known Problems Paternal Uncle     No Known Problems Maternal Grandmother     No Known Problems Maternal Grandfather     No Known Problems Paternal Grandmother     Amblyopia Neg Hx     Blindness Neg Hx     Cancer Neg Hx     Cataracts Neg Hx      Glaucoma Neg Hx     Macular degeneration Neg Hx     Retinal detachment Neg Hx     Strabismus Neg Hx     Stroke Neg Hx     Thyroid disease Neg Hx      Social History     Tobacco Use    Smoking status: Never    Smokeless tobacco: Never   Substance Use Topics    Alcohol use: Yes     Alcohol/week: 0.0 standard drinks of alcohol     Comment: occacionally    Drug use: No     Review of Systems   Unable to perform ROS: Mental status change       Physical Exam     Initial Vitals   BP Pulse Resp Temp SpO2   03/04/24 0015 03/04/24 0015 03/04/24 0015 03/04/24 0219 03/04/24 0015   120/78 60 14 (!) 87.9 °F (31.1 °C) 100 %      MAP       --                Physical Exam    Nursing note and vitals reviewed.  Constitutional:   Alert male, nonverbal, cold to touch in all extremities   HENT:   Head: Normocephalic and atraumatic.   Dry mucous membrane   Eyes: Conjunctivae and EOM are normal.   Neck: Neck supple.   Normal range of motion.  Cardiovascular:  Regular rhythm.           Bradycardic and regular   Pulmonary/Chest: No respiratory distress. He has no wheezes.   Abdominal: Abdomen is soft. He exhibits no distension.   Genitourinary:    Genitourinary Comments: Indwelling Rivera catheter draining cloudy urine with a lot of sediment     Musculoskeletal:      Cervical back: Normal range of motion and neck supple.      Comments: Generalized extremity atrophy with lower and upper extremity contractures     Neurological: He is alert.         ED Course   Procedures  Labs Reviewed   CBC W/ AUTO DIFFERENTIAL - Abnormal; Notable for the following components:       Result Value    WBC 3.65 (*)     RBC 3.23 (*)     Hemoglobin 8.7 (*)     Hematocrit 27.5 (*)     MCH 26.9 (*)     MCHC 31.6 (*)     RDW 17.0 (*)     Platelets 128 (*)     Lymph # 0.4 (*)     Gran % 79.5 (*)     Lymph % 9.6 (*)     All other components within normal limits   COMPREHENSIVE METABOLIC PANEL - Abnormal; Notable for the following components:    Glucose 68 (*)     Albumin  2.0 (*)     Anion Gap 7 (*)     All other components within normal limits   TSH - Abnormal; Notable for the following components:    TSH 4.978 (*)     All other components within normal limits    Narrative:     ADD ON CPK PER DR BHAVANI CONTRERAS/ORDER# [id number] @ 4:07AM   IRON AND TIBC - Abnormal; Notable for the following components:    Transferrin 113 (*)     TIBC 167 (*)     All other components within normal limits    Narrative:     ADD ON CPK PER DR BHAVANI CONTRERAS/ORDER# [id number] @ 4:07AM   FERRITIN - Abnormal; Notable for the following components:    Ferritin 910 (*)     All other components within normal limits    Narrative:     ADD ON CPK PER DR BHAVANI CONTRERAS/ORDER# [id number] @ 4:07AM   FOLATE - Abnormal; Notable for the following components:    Folate 2.4 (*)     All other components within normal limits    Narrative:     ADD ON CPK PER DR BHAVANI CONTRERAS/ORDER# [id number] @ 4:07AM   COMPREHENSIVE METABOLIC PANEL - Abnormal; Notable for the following components:    Chloride 111 (*)     Glucose 60 (*)     Calcium 8.6 (*)     Total Protein 5.7 (*)     Albumin 1.8 (*)     Anion Gap 4 (*)     All other components within normal limits   CBC W/ AUTO DIFFERENTIAL - Abnormal; Notable for the following components:    WBC 3.44 (*)     RBC 2.88 (*)     Hemoglobin 7.8 (*)     Hematocrit 25.0 (*)     MCHC 31.2 (*)     RDW 17.2 (*)     Platelets 120 (*)     Immature Granulocytes 0.6 (*)     Lymph # 0.4 (*)     Gran % 79.7 (*)     Lymph % 11.0 (*)     All other components within normal limits   ISTAT PROCEDURE - Abnormal; Notable for the following components:    POC PH 7.499 (*)     POC PO2 72 (*)     POC HCO3 30.3 (*)     POC BE 7 (*)     POC TCO2 31 (*)     All other components within normal limits   POCT GLUCOSE - Abnormal; Notable for the following components:    POCT Glucose 52 (*)     All other components within normal limits   ISTAT PROCEDURE - Abnormal; Notable for the following components:    POC Glucose 62 (*)      POC TCO2 (MEASURED) 31 (*)     POC Hematocrit 25 (*)     All other components within normal limits   POCT GLUCOSE - Abnormal; Notable for the following components:    POCT Glucose 183 (*)     All other components within normal limits   ISTAT PROCEDURE - Abnormal; Notable for the following components:    POC PCO2 52.7 (*)     POC HCO3 29.8 (*)     POC BE 4 (*)     POC TCO2 31 (*)     POC Hematocrit 26 (*)     All other components within normal limits   HIV 1 / 2 ANTIBODY    Narrative:     Release to patient->Immediate   MAGNESIUM   MAGNESIUM   VITAMIN B12    Narrative:     ADD ON CPK PER DR BHAVANI CONTRERAS/ORDER# [id number] @ 4:07AM   MAGNESIUM   PHOSPHORUS   LACTIC ACID, PLASMA   SARS-COV2 (COVID) WITH FLU/RSV BY PCR   CK   CK    Narrative:     ADD ON CPK PER DR BHAVANI CONTRERAS/ORDER# [id number] @ 4:07AM   T4, FREE    Narrative:     ADD ON CPK PER DR BHAVANI CONTRERAS/ORDER# [id number] @ 4:07AM   ISTAT LACTATE   POCT GLUCOSE MONITORING CONTINUOUS        ECG Results              EKG 12-lead (Final result)        Collection Time Result Time QRS Duration OHS QTC Calculation    03/04/24 04:38:09 03/04/24 07:56:18 82 464                     Final result by Interface, Lab In [facility] (03/04/24 07:56:27)                   Narrative:    Test Reason : R41.82,    Vent. Rate : 055 BPM     Atrial Rate : 054 BPM     P-R Int : 000 ms          QRS Dur : 082 ms      QT Int : 486 ms       P-R-T Axes : 000 017 062 degrees     QTc Int : 464 ms    NSR with borderline first degree  Nonspecific T wave abnormality  Abnormal R wave progression in the precordial leads  Prolonged QT  Abnormal ECG  When compared with ECG of 24-DEC-2023 09:38,  Vent. rate has decreased BY  52 BPM  Anterior forces havechanged  Confirmed by Toan GREENBERG MD ([id number]) on 3/4/2024 7:56:13 AM    Referred By: [facility]           Confirmed By:Toan GREENBERG MD                                  Imaging Results              CT Head Without Contrast (Final result)  Result time 03/04/24  "06:22:13      Final result by Marcial De La Rosa MD (03/04/24 06:22:13)                   Impression:      Chronic changes are noted, there is no evidence for acute intracranial process.  Evaluation with MRI as clinically warranted.    Electronically signed by resident: Tahmina Liz  Date:    03/04/2024  Time:    05:09    Electronically signed by: Marcial De La Rosa  Date:    03/04/2024  Time:    06:22               Narrative:    EXAMINATION:  CT HEAD WITHOUT CONTRAST    CLINICAL HISTORY:  Mental status change, unknown cause;    TECHNIQUE:  Low dose axial CT images obtained throughout the head without the use of intravenous contrast.  Axial, sagittal and coronal reconstructions were performed.    COMPARISON:  CT head 02/16/2023,    FINDINGS:  Intracranial compartment:    Diffuse cerebral volume loss with compensatory enlargement of the sulci and ventricles.    Hypoattenuation in the right centrum semiovale and right basal ganglia and right thalamus suggestive of remote infarct.    No acute hemorrhage, edema or major vascular distribution infarct.    No extra-axial blood or fluid collections.    Skull/extracranial contents (limited evaluation):    Calcific atherosclerosis involving bilateral ICA clinoid segments.    No displaced calvarial fracture.  There is partial opacification the right frontal sinus.  Mild paranasal sinus mucosal thickening is otherwise noted.                                       X-Ray Chest AP Portable (Final result)  Result time 03/04/24 02:25:18      Final result by Dayne Marquez MD (03/04/24 02:25:18)                   Impression:      Probable trace pleural effusions and bibasilar subsegmental atelectasis.  No focal consolidation identified on this hypoventilatory limited single view.      Electronically signed by: Dayne Marquez MD  Date:    03/04/2024  Time:    02:25               Narrative:    EXAMINATION:  XR CHEST AP PORTABLE    CLINICAL HISTORY:  Provided history is "altered " "mental status;  ".    TECHNIQUE:  One view of the chest.    COMPARISON:  12/26/2023.    FINDINGS:  Lung volumes are low, accentuating basilar markings.  Cardiomediastinal silhouette is not enlarged.  Possible small pleural effusions, more pronounced on the left side.  Bibasilar subsegmental atelectasis.  Coarse perihilar interstitial markings as seen previously.  No confluent area of consolidation.  No large pleural effusion.  No distinct pneumothorax.                                       Medications   sodium chloride 0.9% flush 10 mL (has no administration in time range)   naloxone 0.4 mg/mL injection 0.02 mg (0.4 mg Intravenous Given 3/4/24 1157)   glucose chewable tablet 16 g (has no administration in time range)   glucose chewable tablet 24 g (has no administration in time range)   glucagon (human recombinant) injection 1 mg (has no administration in time range)   enoxaparin injection 40 mg (40 mg Subcutaneous Given 3/4/24 1807)   insulin aspart U-100 pen 0-5 Units (has no administration in time range)   dextrose 10% bolus 125 mL 125 mL (has no administration in time range)   dextrose 10% bolus 250 mL 250 mL (has no administration in time range)   vancomycin - pharmacy to dose (has no administration in time range)   piperacillin-tazobactam (ZOSYN) 4.5 g in dextrose 5 % in water (D5W) 100 mL IVPB (MB+) (4.5 g Intravenous Not Given 3/4/24 2030)   dextrose 10% bolus 250 mL 250 mL (has no administration in time range)   dextrose 10 % infusion ( Intravenous New Bag 3/4/24 1924)   phenylephrine HCl in 0.9% NaCl 1 mg/10 mL (100 mcg/mL) syringe (  Canceled Entry 3/4/24 7630)   NORepinephrine 4 mg in dextrose 5% 250 mL infusion (premix) (0.1 mcg/kg/min × 119.3 kg Intravenous New Bag 3/4/24 1509)   vancomycin 1,500 mg in dextrose 5 % (D5W) 250 mL IVPB (Vial-Mate) (has no administration in time range)   tamsulosin 24 hr capsule 0.4 mg (0 mg Oral Hold 3/4/24 0900)   atorvastatin tablet 40 mg (0 mg Oral Hold 3/4/24 0900) "   pantoprazole EC tablet 40 mg (0 mg Oral Hold 3/4/24 0900)   propofol (DIPRIVAN) 10 mg/mL infusion (15 mcg/kg/min × 113 kg Intravenous New Bag 3/4/24 2129)   sodium chloride 0.9% bolus 1,000 mL 1,000 mL (0 mLs Intravenous Stopped 3/4/24 0716)   dextrose 50 % in water (D50W) injection 25 g (25 g Intravenous Given 3/4/24 0401)   vancomycin 2 g in dextrose 5 % 500 mL IVPB (0 mg Intravenous Stopped 3/4/24 0721)   lactated ringers bolus 1,000 mL (0 mLs Intravenous Stopped 3/4/24 0617)   LORazepam injection 2 mg (2 mg Intravenous Given 3/4/24 0500)   atropine injection 1 mg (1 mg Intravenous Given 3/4/24 0551)   rocuronium injection 100 mg (100 mg Intravenous Given 3/4/24 1305)   LORazepam injection 4 mg (4 mg Intravenous Given 3/4/24 1310)     Medical Decision Making  This patient was emergently assessed shortly after arrival.  Initial concern for hypothermia and active and passive warming procedures taken.  Concern for sepsis.  Patient's wounds on the back and sacrum appear dry and intact.  Initial lab significant for leukopenia with anemia, H&H 7.8/25.  Broad-spectrum antibiotics started.  Chest x-ray without evidence of large infiltrate.  Shortly after initial evaluation warm fluids were started but the patient dropped his pressures.  MICU consulted for admission.    Amount and/or Complexity of Data Reviewed  Labs: ordered.  Radiology: ordered.    Risk  Decision regarding hospitalization.              Attending Attestation:         Attending Critical Care:   Critical Care Times:   Direct Patient Care (initial evaluation, reassessments, and time considering the case)................................................................15 minutes.   Additional History from reviewing old medical records or taking additional history from the family, EMS, PCP, etc.......................5 minutes.   Ordering, Reviewing, and Interpreting Diagnostic  Studies...............................................................................................................5 minutes.   Documentation..................................................................................................................................................................................5 minutes.   Consultation with other Physicians. .................................................................................................................................................5 minutes.   Consultation with the patient's family directly relating to the patient's condition, care, and DNR status (when patient unable)......0 minutes.   Other..................................................................................................................................................................................................15 minutes.   ==============================================================  Total Critical Care Time - exclusive of procedural time: 50 minutes.  ==============================================================  Critical care was necessary to treat or prevent imminent or life-threatening deterioration of the following conditions: metabolic crisis and sepsis.   The following critical care procedures were done by me (see procedure notes): pulse oximetry.   Critical care was time spent personally by me on the following activities: obtaining history from patient or relative, examination of patient, review of old charts, ordering lab, x-rays, and/or EKG, development of treatment plan with patient or relative, ordering and performing treatments and interventions, discussions with primary provider, evaluation of patient's response to treatment, discussion with consultants, interpretation of cardiac measurements, re-evaluation of patient's conition and end of life discussions.   Critical Care Condition: potentially life-threatening                                   Clinical Impression:  Final diagnoses:  [R41.82] Altered mental status  [T68.XXXA] Hypothermia          ED Disposition Condition    Admit                 Jarrod Colin MD  03/04/24 5811

## 2024-03-04 NOTE — SUBJECTIVE & OBJECTIVE
Past Medical History:   Diagnosis Date    Acute on chronic systolic heart failure 12/13/2023    Anemia 06/04/2021    Anticoagulant long-term use     Basal ganglia hemorrhage     Left basal ganglia hemorrhage with resultant right-sided hemiparesis which has resolved.     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        Past Surgical History:   Procedure Laterality Date    CARPAL TUNNEL RELEASE Left 2/19/2020    Procedure: RELEASE, CARPAL TUNNEL LEFT;  Surgeon: Maria Luisa Mccurdy MD;  Location: [facility] OR;  Service: Orthopedics;  Laterality: Left;    COLONOSCOPY N/A 7/28/2023    Procedure: COLONOSCOPY;  Surgeon: Jaylene Alvarado MD;  Location: [facility] ENDO;  Service: Endoscopy;  Laterality: N/A;    EPIDURAL STEROID INJECTION N/A 5/2/2019    Procedure: Injection, Steroid, Epidural Cervical;  Surgeon: Dariel Doan Jr., MD;  Location: [facility] ENDO;  Service: Pain Management;  Laterality: N/A;  Cervical Epidural Steroid Injection     18075    Arrive @ 1130; ASA; Check BG    EPIDURAL STEROID INJECTION Bilateral 5/29/2019    Procedure: Lumbar Medial Branch Blocks;  Surgeon: Dariel Doan Jr., MD;  Location: [facility] ENDO;  Service: Pain Management;  Laterality: Bilateral;  Bilateral Lumbar Medial Branch Blocks L3, L4, L5    92802  [id number]    Attive @ 1030 (11 arrival request); NO Sedation; ASA; Check BG    EPIDURAL STEROID INJECTION Bilateral 7/3/2019    Procedure: Lumbar  Medial Branch Blocks;  Surgeon: Dariel Doan Jr., MD;  Location: [facility] ENDO;  Service: Pain Management;  Laterality: Bilateral;  Bilateral Lumbar Medial Branch Blocks L3, L4, L5    85359  [id number]    Arrive @ 0930; NO Sedation; ASA; Check BG    EPIDURAL STEROID INJECTION N/A 8/7/2019    Procedure: Injection, Steroid, Epidural Cervical;  Surgeon: Dariel Doan Jr., MD;  Location: [facility] ENDO;  Service: Pain Management;  Laterality: N/A;  Cervical Epidural Steroid Injection C7-T1    04991    Arrive @ 1115; Last ASA 7/30; Check BG    ESOPHAGOGASTRODUODENOSCOPY N/A 7/28/2023    Procedure: EGD (ESOPHAGOGASTRODUODENOSCOPY);  Surgeon: Jaylene Alvarado MD;  Location: [facility] ENDO;  Service: Endoscopy;  Laterality: N/A;  inst via portal    FUSION, SPINE, LUMBAR, TLIF, POSTERIOR APPROACH, USING PEDICLE SCREW N/A 12/15/2023    Procedure: L-1 TO L-4 FUSION, SPINE, LUMBAR, TLIF, POSTERIOR APPROACH, USING PEDICLE SCREW;  Surgeon: Rolf Rincon MD;  Location: [facility] OR [address] [place] [address];  Service: Neurosurgery;  Laterality: N/A;    LEFT HEART CATHETERIZATION Left 9/21/2021    Procedure: Left heart cath 9am start, R rad access;  Surgeon: Dariel Conner MD;  Location: [facility] CATH LAB;  Service: Cardiology;  Laterality: Left;  RN PRE OP Covid NEGATIVE ON  9-20-21.  C A    MIDLINE CATHETER PLACEMENT  12/12/2023    NO PAST SURGERIES         Review of patient's allergies indicates:   Allergen Reactions    Tomato (solanum lycopersicum) Hives    Naproxen Hives    Shrimp Other (See Comments)       Family History       Problem Relation (Age of Onset)    Diabetes Paternal Grandfather    Heart disease Paternal Grandfather    Hypertension     No Known Problems Mother, Father, Sister, Brother, Maternal Aunt, Maternal Uncle, Paternal Aunt, Paternal Uncle, Maternal Grandmother, Maternal Grandfather, Paternal Grandmother          Tobacco Use    Smoking status: Never    Smokeless tobacco: Never   Substance and Sexual Activity    Alcohol use: Yes      Alcohol/week: 0.0 standard drinks of alcohol     Comment: occacionally    Drug use: No    Sexual activity: Not Currently      Review of Systems   Unable to perform ROS: Mental status change     Objective:     Vital Signs (Most Recent):  Temp: (!) 89.6 °F (32 °C) (03/04/24 0435)  Pulse: 61 (03/04/24 0435)  Resp: (!) 22 (03/04/24 0435)  BP: 105/71 (03/04/24 0435)  SpO2: 100 % (03/04/24 0435) Vital Signs (24h Range):  Temp:  [87.9 °F (31.1 °C)-89.6 °F (32 °C)] 89.6 °F (32 °C)  Pulse:  [52-69] 61  Resp:  [12-22] 22  SpO2:  [98 %-100 %] 100 %  BP: ()/(47-78) 105/71   Weight: 119.3 kg (263 lb)  Body mass index is 38.84 kg/m².    No intake or output data in the 24 hours ending 03/04/24 0444       Physical Exam  Vitals and nursing note reviewed.   Constitutional:       General: He is in acute distress.      Appearance: He is well-developed. He is ill-appearing.   HENT:      Head: Normocephalic.      Mouth/Throat:      Mouth: Mucous membranes are moist.   Eyes:      Pupils: Pupils are equal, round, and reactive to light.   Cardiovascular:      Rate and Rhythm: Tachycardia present. Rhythm irregular.   Pulmonary:      Effort: Pulmonary effort is normal.   Abdominal:      General: Abdomen is flat.      Palpations: Abdomen is soft.   Musculoskeletal:         General: No swelling.   Skin:     General: Skin is warm and dry.            Vents:     Lines/Drains/Airways       Drain  Duration                  Urethral Catheter 03/04/24 0249 Silicone 16 Fr. <1 day              Peripheral Intravenous Line  Duration                  Peripheral IV - Single Lumen 20 G Anterior;Distal;Left Upper Arm -- days         Peripheral IV - Single Lumen 03/04/24 0215 20 G Anterior;Distal;Left Forearm <1 day                  Significant Labs:    CBC/Anemia Profile:  Recent Labs   Lab 03/04/24  0214 03/04/24  0402 03/04/24  0404 03/04/24  0406   WBC 3.65* 3.44*  --   --    HGB 8.7* 7.8*  --   --    HCT 27.5* 25.0* 26* 25*   * 120*  --   --     MCV 85 87  --   --    RDW 17.0* 17.2*  --   --         Chemistries:  Recent Labs   Lab 03/04/24  0214      K 4.4      CO2 24   BUN 22   CREATININE 0.9   CALCIUM 9.1   ALBUMIN 2.0*   PROT 6.6   BILITOT 0.2   ALKPHOS 103   ALT 17   AST 23   MG 2.2       All pertinent labs within the past 24 hours have been reviewed.    Significant Imaging: I have reviewed all pertinent imaging results/findings within the past 24 hours.

## 2024-03-04 NOTE — ASSESSMENT & PLAN NOTE
Verbal and follows command at baseline, was not doing upon admission and for 2 days prior. Has right-sided deficits.

## 2024-03-04 NOTE — HPI
73M with PMH CVA with R sided deficits, DM, HTN who presents to the ED from SNF for AMS. Per chart, he was more somnolent than usual over the last 2 days. At baseline he is verbal and follows commands but does not ambulate. On arrival to ED he was hypothermic to 87.9F and found to be hypoglycemic to the 50s. Intermittently hypotensive to MAP 55. Patient is awake but does not interact meaningfully on exam. Chronic Rivera was initially draining cloudy urine with sediment, Rivera exchanged. Initial labs without leukocytosis, CMP unremarkable, VBG with pH 7.49/30.3/38.9, lactate normal. Received 1L NS bolus through warming pump. Initially admitted to , critical care consulted for severe hypothermia with hypotension.

## 2024-03-04 NOTE — CODE/ RAPID DOCUMENTATION
RAPID RESPONSE NURSE NOTE        Admit Date: 3/4/2024  LOS: 0  Code Status: Full Code   Date of Consult: 2024  : 1950  Age: 74 y.o.  Weight:   Wt Readings from Last 1 Encounters:   24 119.3 kg (263 lb)     Sex: male  Race: Black or    Bed: [place] [address] A:   MRN: [id number]  Time Rapid Response Team page Received: N/A - alerted via [facility] MEWS  Time Rapid Response Team at Bedside: 355  Time Rapid Response Team left Bedside: 620  Was the patient discharged from an ICU this admission? No   Was the patient discharged from a PACU within last 24 hours? No   Did the patient receive conscious sedation/general anesthesia in last 24 hours? No  Was the patient in the ED within the past 24 hours? Yes  Was the patient on NIPPV within the past 24 hours? No   Did this progress into an ARC or CPA: No  Attending Physician: Dileep Davis MD  Primary Service: Pulmonary Disease       SITUATION    Notified by [facility] patient alert.  Reason for alert: Hypothermia, hypoglycemia, hypotension, AMS  Called to evaluate the patient for Circulatory, Neuro    BACKGROUND     Why is the patient in the hospital?: Hypothermia    Patient has a past medical history of Acute on chronic systolic heart failure, Anemia, Anticoagulant long-term use, Basal ganglia hemorrhage, Benign hypertension with CKD (chronic kidney disease) stage III, Cataract, Chronic idiopathic gout of multiple sites, Chronic kidney disease, stage 3, COPD (chronic obstructive pulmonary disease), Erectile dysfunction, Gout, Hemorrhoids without complication, Hyperlipidemia, Morbid obesity, Obstructive sleep apnea on CPAP, Reactive airway disease without complication, Severe sepsis, Stroke, Thalamic infarct, acute (right), and 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    Last Vitals:  Temp: 90.1 °F (32.3 °C) (630)  Pulse: 60 (630)  Resp: 12 (630)  BP: 131/87 (630)  SpO2: 97 % (630)    24 Hours Vitals  Range:  Temp:  [87.9 °F (31.1 °C)-90.1 °F (32.3 °C)]   Pulse:  [24-69]   Resp:  [10-22]   BP: ()/(45-93)   SpO2:  [97 %-100 %]     Labs:  Recent Labs     03/04/24 0214 03/04/24  0402 03/04/24  0404 03/04/24  0406   WBC 3.65* 3.44*  --   --    HGB 8.7* 7.8*  --   --    HCT 27.5* 25.0* 26* 25*   * 120*  --   --        Recent Labs     03/04/24 0214 03/04/24  0402    141   K 4.4 4.3    111*   CO2 24 26   BUN 22 20   CREATININE 0.9 0.9   GLU 68* 60*   PHOS  --  3.4   MG 2.2 2.1        Recent Labs     03/04/24 0221 03/04/24 0404   PH 7.499* 7.360   PCO2 38.9 52.7*   PO2 72* 60   HCO3 30.3* 29.8*   POCSATURATED 96 89   BE 7* 4*        ASSESSMENT    Physical Exam  Vitals and nursing note reviewed.   Constitutional:       General: He is in acute distress.      Appearance: He is ill-appearing.   HENT:      Mouth/Throat:      Mouth: Mucous membranes are dry.      Pharynx: Oropharynx is clear.   Eyes:      Pupils: Pupils are equal, round, and reactive to light.      Comments: 2+ Bilaterally   Cardiovascular:      Rate and Rhythm: Regular rhythm. Bradycardia present.   Pulmonary:      Effort: Pulmonary effort is normal.   Musculoskeletal:      Comments: UE contractions (baseline?)   Skin:     General: Skin is dry.      Capillary Refill: Capillary refill takes less than 2 seconds.      Comments: Cool-to-palpation   Neurological:      Mental Status: He is alert. He is disoriented.      Comments: Nonverbal (baseline)       INTERVENTIONS    Upon initial assessment, pt. Laying in ED stretcher with primary RN, charge RN, and  MD at . Pt. Awake, alert, nonverbal (baseline?), not following commands, tremulous. Found to be hypotensive with Bps 70-80s/50-60s, hypoglycemic (corrected by ED with D50), and hypothermic (89.2 core rectal - Kenya hugger in place). Per ED RN, unknown baseline mentation from nursing home.     Airway: patent, non obstructed.  Breathing: non labored, equal chest rise and fall, spO2  100% RA.  Circulation: without gross hemorrhage or bleeding noted externally, skin cool and dry to palpation.    Vital Signs:  HR: 64, SB - (extensive artifact noted on cardiac monitor)  BP: 105/59 (74), (warmed fluid bolus ongoing),  SpO2: 100% RA,  RR: 24,  Temperature: 89.2 (core rectal).    MICU consulted s/t multiple hemodynamic derangements and at bs for assessment. Planning to admit to [facility] for HLOC and possible vasopressor support.     While awaiting ICU bed, pt. Taken by RRN x2 and primary RN to CT on continuous cardiac monitor. While in CT, pt. With increased agitation and IV Ativan given per MAR/verbal CCM order. Post scan, taken back to ED room 1.    Pt. Was then repositioned in bed for comfort and linens changed. Upon re-evaluating vital signs on continuous cardiac monitor, pt. Found to be profoundly bradycardic into the 20s and hypotensive with SBP in the 70s. CCM made aware and atropine administered IVP and peripheral levo initiated per orders. Was also placed on Zoll defibrillator pads. Reassessment then revealed increase in HR and BP.    Vital Signs:  HR: 72, NSR without ectopy,  BP: 131/81 (100),  SpO2: 100% RA,  RR: 20,  Temperature: 90.1 (core rectal).    Pt. Was then transported to CICU 3082 with RRN x2 on portable cardiac monitor. Verbal bedside report given to primary CICU Silverio MOROCHO. No personal belongings sent with patient and no questions noted per receiving unit. Please call RRN with questions/concerns.    PROVIDER ESCALATION    Orders received and case discussed with Dr. Alanis .    Primary team arrival time: 0350 (HM)    Disposition: Tx in ICU bed 3082.    FOLLOW UP    Bedside Silverio MOROCHO (CICU)  updated on plan of care. Please call the Rapid Response Nurse, Rosalie Gupta RN at [phone] for additional questions or concerns.

## 2024-03-04 NOTE — PROCEDURES
"Bj Pate Jr. is a 74 y.o. male patient.    Temp: (!) 94.8 °F (34.9 °C) (Simultaneous filing. User may not have seen previous data.) (24)  Pulse: 89 (Simultaneous filing. User may not have seen previous data.) (24)  Resp: (!) 22 (Simultaneous filing. User may not have seen previous data.) (24)  BP: 118/78 (Simultaneous filing. User may not have seen previous data.) (24)  SpO2: 100 % (Simultaneous filing. User may not have seen previous data.) (24)  Weight: 113 kg (249 lb 1.9 oz) (24)  Height: 5' 9" (175.3 cm) (24)       Intubation    Date/Time: 3/4/2024 1:13 PM  Location procedure was performed: [facility] CRITICAL CARE MEDICINE    Performed by: Ho Hayes MD  Authorized by: Aiden Reyes MD  Consent Done: Yes  Consent: Verbal consent obtained.  Consent given by: power of   Patient identity confirmed: , MRN and name  Time out: Immediately prior to procedure a "time out" was called to verify the correct patient, procedure, equipment, support staff and site/side marked as required.  Indications: airway protection  Description of findings: Grade 1 view of cords, no complications   Intubation method: video-assisted  Patient status: paralyzed (RSI)  Preoxygenation: BVM  Sedatives: lorazepam  Paralytic: rocuronium  Laryngoscope size: Glide 4  Tube size: 8.0 mm  Tube type: cuffed  Number of attempts: 1  Cricoid pressure: no  Cords visualized: yes  Post-procedure assessment: chest rise  Breath sounds: equal  Cuff inflated: yes  ETT to teeth: 24 cm  Tube secured with: ETT christy  Chest x-ray interpreted by: ordered stat and pending.  Patient tolerance: Patient tolerated the procedure well with no immediate complications      Ho Hayes MD  [facility] Pul[facility] Critical Care Fellow     3/4/2024    " Cheek-To-Nose Interpolation Flap Text: A decision was made to reconstruct the defect utilizing an interpolation axial flap and a staged reconstruction.  A telfa template was made of the defect.  This telfa template was then used to outline the Cheek-To-Nose Interpolation flap.  The donor area for the pedicle flap was then injected with anesthesia.  The flap was excised through the skin and subcutaneous tissue down to the layer of the underlying musculature.  The interpolation flap was carefully excised within this deep plane to maintain its blood supply.  The edges of the donor site were undermined.   The donor site was closed in a primary fashion.  The pedicle was then rotated into position and sutured.  Once the tube was sutured into place, adequate blood supply was confirmed with blanching and refill.  The pedicle was then wrapped with xeroform gauze and dressed appropriately with a telfa and gauze bandage to ensure continued blood supply and protect the attached pedicle.

## 2024-03-04 NOTE — PLAN OF CARE
CM to patient's room for discharge planning assessment. Patient currently being intubated. CM/SW will follow up at later time for assessment completion.      Berta Fatima RN  Weekend  - [facility]  Spectra: [phone]

## 2024-03-04 NOTE — ED NOTES
Nurses Note -- 4 Eyes      3/4/2024   2:15 AM      Skin assessed during: Admit      [] No Altered Skin Integrity Present    []Prevention Measures Documented      [x] Yes- Altered Skin Integrity Present or Discovered   [x] LDA Added if Not in Epic (Describe Wound)   [x] New Altered Skin Integrity was Present on Admit and Documented in LDA   [x] Wound Image Taken    Wound Care Consulted? Yes    Attending Nurse:  Dick Lam RN/Staff Member:   jolie

## 2024-03-04 NOTE — PROGRESS NOTES
Vancomycin dosing assessment and plan:    Will adjust vancomycin dose and frequency in the setting of critical illness and patient age.   Will change vancomycin maintenance dose from 1250 mg Q12H to 1500 mg Q24H beginning 3/5 at 0500.   Vancomycin trough to be collected 3/6 at 0400 (60 minutes prior to third dose of regimen.     Please contact [phone] with any questions regarding this assessment.     Traci Shell, PharmD   [phone];

## 2024-03-04 NOTE — CARE UPDATE
RRN IV START       IV started during emergency event. 18g placed to R. Hand. +blood return, flushed with 10mL NS.   Please call Rapid Response RN, Rosalie Gupta RN with any questions or concerns at [phone].

## 2024-03-05 LAB
ALBUMIN SERPL BCP-MCNC: 1.9 G/DL (ref 3.5–5.2)
ALLENS TEST: ABNORMAL
ALP SERPL-CCNC: 92 U/L (ref 55–135)
ALT SERPL W/O P-5'-P-CCNC: 17 U/L (ref 10–44)
ANION GAP SERPL CALC-SCNC: 8 MMOL/L (ref 8–16)
AST SERPL-CCNC: 17 U/L (ref 10–40)
BASOPHILS # BLD AUTO: 0.02 K/UL (ref 0–0.2)
BASOPHILS NFR BLD: 0.3 % (ref 0–1.9)
BILIRUB SERPL-MCNC: 0.3 MG/DL (ref 0.1–1)
BUN SERPL-MCNC: 19 MG/DL (ref 8–23)
CALCIUM SERPL-MCNC: 8.8 MG/DL (ref 8.7–10.5)
CHLORIDE SERPL-SCNC: 105 MMOL/L (ref 95–110)
CO2 SERPL-SCNC: 23 MMOL/L (ref 23–29)
CREAT SERPL-MCNC: 1.3 MG/DL (ref 0.5–1.4)
DELSYS: ABNORMAL
DIFFERENTIAL METHOD BLD: ABNORMAL
EOSINOPHIL # BLD AUTO: 0.1 K/UL (ref 0–0.5)
EOSINOPHIL NFR BLD: 0.7 % (ref 0–8)
ERYTHROCYTE [DISTWIDTH] IN BLOOD BY AUTOMATED COUNT: 17.5 % (ref 11.5–14.5)
ERYTHROCYTE [SEDIMENTATION RATE] IN BLOOD BY WESTERGREN METHOD: 14 MM/H
EST. GFR  (NO RACE VARIABLE): 58.4 ML/MIN/1.73 M^2
FIO2: 30
GLUCOSE SERPL-MCNC: 145 MG/DL (ref 70–110)
HCO3 UR-SCNC: 24.4 MMOL/L (ref 24–28)
HCT VFR BLD AUTO: 27.6 % (ref 40–54)
HGB BLD-MCNC: 9 G/DL (ref 14–18)
IMM GRANULOCYTES # BLD AUTO: 0.04 K/UL (ref 0–0.04)
IMM GRANULOCYTES NFR BLD AUTO: 0.6 % (ref 0–0.5)
LYMPHOCYTES # BLD AUTO: 1.2 K/UL (ref 1–4.8)
LYMPHOCYTES NFR BLD: 17.2 % (ref 18–48)
MAGNESIUM SERPL-MCNC: 2 MG/DL (ref 1.6–2.6)
MCH RBC QN AUTO: 27.4 PG (ref 27–31)
MCHC RBC AUTO-ENTMCNC: 32.6 G/DL (ref 32–36)
MCV RBC AUTO: 84 FL (ref 82–98)
MODE: ABNORMAL
MONOCYTES # BLD AUTO: 0.8 K/UL (ref 0.3–1)
MONOCYTES NFR BLD: 11.6 % (ref 4–15)
NEUTROPHILS # BLD AUTO: 4.9 K/UL (ref 1.8–7.7)
NEUTROPHILS NFR BLD: 69.6 % (ref 38–73)
NRBC BLD-RTO: 1 /100 WBC
OHS QRS DURATION: 84 MS
OHS QTC CALCULATION: 495 MS
PCO2 BLDA: 32.1 MMHG (ref 35–45)
PEEP: 5
PH SMN: 7.49 [PH] (ref 7.35–7.45)
PHOSPHATE SERPL-MCNC: 2.2 MG/DL (ref 2.7–4.5)
PLATELET # BLD AUTO: 165 K/UL (ref 150–450)
PMV BLD AUTO: 12.1 FL (ref 9.2–12.9)
PO2 BLDA: 134 MMHG (ref 80–100)
POC BE: 1 MMOL/L
POC SATURATED O2: 99 % (ref 95–100)
POC TCO2: 25 MMOL/L (ref 23–27)
POCT GLUCOSE: 107 MG/DL (ref 70–110)
POCT GLUCOSE: 120 MG/DL (ref 70–110)
POCT GLUCOSE: 143 MG/DL (ref 70–110)
POCT GLUCOSE: 147 MG/DL (ref 70–110)
POCT GLUCOSE: 154 MG/DL (ref 70–110)
POCT GLUCOSE: 182 MG/DL (ref 70–110)
POCT GLUCOSE: 72 MG/DL (ref 70–110)
POCT GLUCOSE: 84 MG/DL (ref 70–110)
POCT GLUCOSE: 85 MG/DL (ref 70–110)
POCT GLUCOSE: 98 MG/DL (ref 70–110)
POTASSIUM SERPL-SCNC: 4 MMOL/L (ref 3.5–5.1)
PROT SERPL-MCNC: 6.6 G/DL (ref 6–8.4)
RBC # BLD AUTO: 3.29 M/UL (ref 4.6–6.2)
SAMPLE: ABNORMAL
SITE: ABNORMAL
SODIUM SERPL-SCNC: 136 MMOL/L (ref 136–145)
SP02: 100
TRIGL SERPL-MCNC: 105 MG/DL (ref 30–150)
VT: 480
WBC # BLD AUTO: 7.05 K/UL (ref 3.9–12.7)

## 2024-03-05 PROCEDURE — 80053 COMPREHEN METABOLIC PANEL: CPT

## 2024-03-05 PROCEDURE — 85025 COMPLETE CBC W/AUTO DIFF WBC: CPT

## 2024-03-05 PROCEDURE — 93005 ELECTROCARDIOGRAM TRACING: CPT

## 2024-03-05 PROCEDURE — 94761 N-INVAS EAR/PLS OXIMETRY MLT: CPT | Mod: XB

## 2024-03-05 PROCEDURE — 63600175 PHARM REV CODE 636 W HCPCS

## 2024-03-05 PROCEDURE — 63600175 PHARM REV CODE 636 W HCPCS: Performed by: STUDENT IN AN ORGANIZED HEALTH CARE EDUCATION/TRAINING PROGRAM

## 2024-03-05 PROCEDURE — 25000003 PHARM REV CODE 250: Performed by: INTERNAL MEDICINE

## 2024-03-05 PROCEDURE — 25000003 PHARM REV CODE 250

## 2024-03-05 PROCEDURE — 27200966 HC CLOSED SUCTION SYSTEM

## 2024-03-05 PROCEDURE — 99900026 HC AIRWAY MAINTENANCE (STAT)

## 2024-03-05 PROCEDURE — 27100171 HC OXYGEN HIGH FLOW UP TO 24 HOURS

## 2024-03-05 PROCEDURE — 95720 EEG PHY/QHP EA INCR W/VEEG: CPT | Mod: ,,, | Performed by: PSYCHIATRY & NEUROLOGY

## 2024-03-05 PROCEDURE — 82803 BLOOD GASES ANY COMBINATION: CPT

## 2024-03-05 PROCEDURE — 83735 ASSAY OF MAGNESIUM: CPT

## 2024-03-05 PROCEDURE — 20000000 HC ICU ROOM

## 2024-03-05 PROCEDURE — 84478 ASSAY OF TRIGLYCERIDES: CPT

## 2024-03-05 PROCEDURE — 99900035 HC TECH TIME PER 15 MIN (STAT)

## 2024-03-05 PROCEDURE — 94003 VENT MGMT INPAT SUBQ DAY: CPT

## 2024-03-05 PROCEDURE — 99233 SBSQ HOSP IP/OBS HIGH 50: CPT | Mod: GC,,, | Performed by: INTERNAL MEDICINE

## 2024-03-05 PROCEDURE — 95714 VEEG EA 12-26 HR UNMNTR: CPT

## 2024-03-05 PROCEDURE — 84100 ASSAY OF PHOSPHORUS: CPT

## 2024-03-05 PROCEDURE — 93010 ELECTROCARDIOGRAM REPORT: CPT | Mod: ,,, | Performed by: INTERNAL MEDICINE

## 2024-03-05 PROCEDURE — 36600 WITHDRAWAL OF ARTERIAL BLOOD: CPT

## 2024-03-05 PROCEDURE — 25500020 PHARM REV CODE 255: Performed by: INTERNAL MEDICINE

## 2024-03-05 PROCEDURE — 63600175 PHARM REV CODE 636 W HCPCS: Performed by: INTERNAL MEDICINE

## 2024-03-05 PROCEDURE — A9585 GADOBUTROL INJECTION: HCPCS | Performed by: INTERNAL MEDICINE

## 2024-03-05 RX ORDER — ATORVASTATIN CALCIUM 40 MG/1
40 TABLET, FILM COATED ORAL DAILY
Status: DISCONTINUED | OUTPATIENT
Start: 2024-03-06 | End: 2024-03-11

## 2024-03-05 RX ORDER — IBUPROFEN 200 MG
24 TABLET ORAL
Status: DISCONTINUED | OUTPATIENT
Start: 2024-03-05 | End: 2024-03-27 | Stop reason: HOSPADM

## 2024-03-05 RX ORDER — POLYETHYLENE GLYCOL 3350 17 G/17G
17 POWDER, FOR SOLUTION ORAL DAILY
Status: CANCELLED | OUTPATIENT
Start: 2024-03-05

## 2024-03-05 RX ORDER — SILODOSIN 4 MG/1
4 CAPSULE ORAL DAILY
Status: DISCONTINUED | OUTPATIENT
Start: 2024-03-05 | End: 2024-03-11

## 2024-03-05 RX ORDER — ATROPINE SULFATE 0.1 MG/ML
INJECTION INTRAVENOUS
Status: DISPENSED
Start: 2024-03-05 | End: 2024-03-05

## 2024-03-05 RX ORDER — HEPARIN SODIUM 5000 [USP'U]/ML
5000 INJECTION, SOLUTION INTRAVENOUS; SUBCUTANEOUS EVERY 8 HOURS
Status: DISCONTINUED | OUTPATIENT
Start: 2024-03-05 | End: 2024-03-12

## 2024-03-05 RX ORDER — IBUPROFEN 200 MG
16 TABLET ORAL
Status: DISCONTINUED | OUTPATIENT
Start: 2024-03-05 | End: 2024-03-27 | Stop reason: HOSPADM

## 2024-03-05 RX ORDER — PANTOPRAZOLE SODIUM 40 MG/10ML
40 INJECTION, POWDER, LYOPHILIZED, FOR SOLUTION INTRAVENOUS DAILY
Status: DISCONTINUED | OUTPATIENT
Start: 2024-03-06 | End: 2024-03-10

## 2024-03-05 RX ORDER — FENTANYL CITRATE 50 UG/ML
50 INJECTION, SOLUTION INTRAMUSCULAR; INTRAVENOUS
Status: DISCONTINUED | OUTPATIENT
Start: 2024-03-05 | End: 2024-03-07

## 2024-03-05 RX ORDER — GADOBUTROL 604.72 MG/ML
10 INJECTION INTRAVENOUS
Status: COMPLETED | OUTPATIENT
Start: 2024-03-05 | End: 2024-03-05

## 2024-03-05 RX ORDER — SODIUM,POTASSIUM PHOSPHATES 280-250MG
1 POWDER IN PACKET (EA) ORAL ONCE
Status: COMPLETED | OUTPATIENT
Start: 2024-03-05 | End: 2024-03-05

## 2024-03-05 RX ADMIN — DEXTROSE MONOHYDRATE: 100 INJECTION, SOLUTION INTRAVENOUS at 10:03

## 2024-03-05 RX ADMIN — DEXTROSE MONOHYDRATE: 100 INJECTION, SOLUTION INTRAVENOUS at 11:03

## 2024-03-05 RX ADMIN — VANCOMYCIN HYDROCHLORIDE 1500 MG: 1.5 INJECTION, POWDER, LYOPHILIZED, FOR SOLUTION INTRAVENOUS at 04:03

## 2024-03-05 RX ADMIN — NOREPINEPHRINE BITARTRATE 0.1 MCG/KG/MIN: 4 INJECTION, SOLUTION INTRAVENOUS at 10:03

## 2024-03-05 RX ADMIN — PROPOFOL 20 MCG/KG/MIN: 10 INJECTION, EMULSION INTRAVENOUS at 10:03

## 2024-03-05 RX ADMIN — PROPOFOL 35 MCG/KG/MIN: 10 INJECTION, EMULSION INTRAVENOUS at 03:03

## 2024-03-05 RX ADMIN — PIPERACILLIN SODIUM AND TAZOBACTAM SODIUM 4.5 G: 4; .5 INJECTION, POWDER, FOR SOLUTION INTRAVENOUS at 08:03

## 2024-03-05 RX ADMIN — GADOBUTROL 10 ML: 604.72 INJECTION INTRAVENOUS at 04:03

## 2024-03-05 RX ADMIN — FENTANYL CITRATE 50 MCG: 50 INJECTION INTRAMUSCULAR; INTRAVENOUS at 05:03

## 2024-03-05 RX ADMIN — PROPOFOL 20 MCG/KG/MIN: 10 INJECTION, EMULSION INTRAVENOUS at 06:03

## 2024-03-05 RX ADMIN — HEPARIN SODIUM 5000 UNITS: 5000 INJECTION INTRAVENOUS; SUBCUTANEOUS at 10:03

## 2024-03-05 RX ADMIN — SILODOSIN 4 MG: 4 CAPSULE ORAL at 11:03

## 2024-03-05 RX ADMIN — PROPOFOL 50 MCG/KG/MIN: 10 INJECTION, EMULSION INTRAVENOUS at 04:03

## 2024-03-05 RX ADMIN — PANTOPRAZOLE SODIUM 40 MG: 40 TABLET, DELAYED RELEASE ORAL at 09:03

## 2024-03-05 RX ADMIN — PIPERACILLIN SODIUM AND TAZOBACTAM SODIUM 4.5 G: 4; .5 INJECTION, POWDER, FOR SOLUTION INTRAVENOUS at 12:03

## 2024-03-05 RX ADMIN — NOREPINEPHRINE BITARTRATE 0.1 MCG/KG/MIN: 4 INJECTION, SOLUTION INTRAVENOUS at 06:03

## 2024-03-05 RX ADMIN — ATORVASTATIN CALCIUM 40 MG: 40 TABLET, FILM COATED ORAL at 09:03

## 2024-03-05 RX ADMIN — NOREPINEPHRINE BITARTRATE 0.08 MCG/KG/MIN: 4 INJECTION, SOLUTION INTRAVENOUS at 04:03

## 2024-03-05 RX ADMIN — DEXTROSE MONOHYDRATE: 100 INJECTION, SOLUTION INTRAVENOUS at 02:03

## 2024-03-05 RX ADMIN — PIPERACILLIN SODIUM AND TAZOBACTAM SODIUM 4.5 G: 4; .5 INJECTION, POWDER, FOR SOLUTION INTRAVENOUS at 06:03

## 2024-03-05 RX ADMIN — NOREPINEPHRINE BITARTRATE 0.08 MCG/KG/MIN: 4 INJECTION, SOLUTION INTRAVENOUS at 10:03

## 2024-03-05 RX ADMIN — DEXTROSE MONOHYDRATE: 100 INJECTION, SOLUTION INTRAVENOUS at 04:03

## 2024-03-05 RX ADMIN — PROPOFOL 30 MCG/KG/MIN: 10 INJECTION, EMULSION INTRAVENOUS at 11:03

## 2024-03-05 RX ADMIN — HEPARIN SODIUM 5000 UNITS: 5000 INJECTION INTRAVENOUS; SUBCUTANEOUS at 02:03

## 2024-03-05 RX ADMIN — POTASSIUM & SODIUM PHOSPHATES POWDER PACK 280-160-250 MG 1 PACKET: 280-160-250 PACK at 11:03

## 2024-03-05 NOTE — PLAN OF CARE
[facility] - Cardiac Intensive Care  Initial Discharge Assessment       Primary Care Provider: Jose Antonio Foster MD    Admission Diagnosis: Hypothermia [T68.XXXA]  Altered mental status [R41.82]  Chest pain [R07.9]  Sepsis [A41.9]    Admission Date: 3/4/2024  Expected Discharge Date: 3/6/2024    Transition of Care Barriers: None    Payor: [employer] [facility] / Plan: [employer] CHOICES / Product Type: Medicare Advantage /     Extended Emergency Contact Information  Primary Emergency Contact: Bj Pate III  Mobile Phone: [phone]  Relation: Son  Preferred language: English   needed? No  Secondary Emergency Contact: Joseph Pate   [place]  Mobile Phone: [phone]  Relation: Daughter    Discharge Plan A: Skilled Nursing Facility  Discharge Plan B: Long-term acute care facility (LTAC)      [id number] - [place] [address] AT [facility] [address] [place] [address] [address]  [facility] [address]  Phone: [phone] Fax: [phone]      Initial Assessment (most recent)       Adult Discharge Assessment - 03/05/24 1431          Discharge Assessment    Assessment Type Discharge Planning Assessment     Confirmed/corrected address, phone number and insurance Yes     Confirmed Demographics Correct on Facesheet     Source of Information family     Reason For Admission hypothermia     People in Home --   from SNF    Facility Arrived From: [facility]     Do you expect to return to your current living situation? Yes     Do you have help at home or someone to help you manage your care at home? Yes     Who are your caregiver(s) and their phone number(s)? fabio Pate [phone]     Prior to hospitilization cognitive status: Unable to Assess     Current cognitive status: Coma/Sedated/Intubated     Walking or Climbing Stairs Difficulty yes     Walking or Climbing Stairs ambulation difficulty, requires equipment      Dressing/Bathing Difficulty yes     Dressing/Bathing bathing difficulty, requires equipment     Equipment Currently Used at Home other (see comments)   pt arriving from SNF    Do you take prescription medications? Yes     Do you have prescription coverage? Yes     Do you have any problems affording any of your prescribed medications? No     Is the patient taking medications as prescribed? yes     Who is going to help you get home at discharge? hospital transport     How do you get to doctors appointments? other (see comments)   facility    Are you on dialysis? No     Do you take coumadin? No     Discharge Plan A Skilled Nursing Facility     Discharge Plan B Long-term acute care facility (LTAC)     DME Needed Upon Discharge  other (see comments)   TBD    Transition of Care Barriers None        Physical Activity    On average, how many days per week do you engage in moderate to strenuous exercise (like a brisk walk)? 0 days     On average, how many minutes do you engage in exercise at this level? 0 min        Financial Resource Strain    How hard is it for you to pay for the very basics like food, housing, medical care, and heating? Not very hard        Housing Stability    In the last 12 months, was there a time when you were not able to pay the mortgage or rent on time? No     In the last 12 months, was there a time when you did not have a steady place to sleep or slept in a shelter (including now)? No        Transportation Needs    In the past 12 months, has lack of transportation kept you from medical appointments or from getting medications? No     In the past 12 months, has lack of transportation kept you from meetings, work, or from getting things needed for daily living? No        Food Insecurity    Within the past 12 months, you worried that your food would run out before you got the money to buy more. Never true     Within the past 12 months, the food you bought just didn't last and you didn't have money to  get more. Never true        Social Connections    In a typical week, how many times do you talk on the phone with family, friends, or neighbors? More than three times a week     How often do you get together with friends or relatives? More than three times a week        Alcohol Use    Q1: How often do you have a drink containing alcohol? --   occassionally per health record                  Completed CM discharge assessment with pt's son.  Per son, pt was independent prior to his stays in LTAC and now SNF at [facility].  Pt's son states plan was for pt to come home after skilled.  Pt will require hospital transport back to facility.  Will continue to follow for d/c needs.    Discharge Plan A and Plan B have been determined by review of patient's clinical status, future medical and therapeutic needs, and coverage/benefits for post-acute care in coordination with multidisciplinary team members.     Nicki Davis RN   Case Management  [id number]

## 2024-03-05 NOTE — PROGRESS NOTES
Vancomycin assessment/plan:    Patient meets criteria for KISHA with creatinine increase from 0.9 mg/dL to 1.3 mg/dL over past 24 hours.   Will discontinue vancomycin regimen and begin pulse dosing at this time in the setting of KISHA.   Next level to be drawn on 3/6 at 0400. Patient has received today's vancomycin dose already so level will be a trough, rather than random level.     rTaci Shell, PharmD   [id number]

## 2024-03-05 NOTE — CONSULTS
[facility] - Cardiac Intensive Care  Wound Care    Patient Name:  Bj Pate Jr.   MRN:  [id number]  Date: 3/5/2024  Diagnosis: Hypothermia    History:     Past Medical History:   Diagnosis Date    Acute on chronic systolic heart failure 12/13/2023    Anemia 06/04/2021    Anticoagulant long-term use     Basal ganglia hemorrhage     Left basal ganglia hemorrhage with resultant right-sided hemiparesis which has resolved.     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        Social History     Socioeconomic History    Marital status:     Number of children: 8    Highest education level: 11th grade   Occupational History    Occupation:    Tobacco Use    Smoking status: Never    Smokeless tobacco: Never   Substance and Sexual Activity    Alcohol use: Yes     Alcohol/week: 0.0 standard drinks of alcohol     Comment: occacionally    Drug use: No    Sexual activity: Not Currently     Social Determinants of Health     Financial Resource Strain: Low Risk  (12/12/2023)    Overall Financial Resource Strain (CARDIA)     Difficulty of Paying Living Expenses: Not very hard   Food Insecurity: No Food Insecurity (12/12/2023)    Hunger Vital Sign     Worried About Running Out of Food in the Last Year: Never true     Ran Out of Food in the Last Year: Never true   Transportation Needs: No Transportation Needs (12/12/2023)    PRAPARE - Transportation     Lack of  Transportation (Medical): No     Lack of Transportation (Non-Medical): No   Physical Activity: Inactive (12/12/2023)    Exercise Vital Sign     Days of Exercise per Week: 0 days     Minutes of Exercise per Session: 0 min   Stress: Patient Declined (12/12/2023)    [employer] - Occupational Stress Questionnaire     Feeling of Stress : Patient declined   Social Connections: Unknown (12/12/2023)    Social Connection and Isolation Panel [NHANES]     Frequency of Communication with Friends and Family: More than three times a week     Frequency of Social Gatherings with Friends and Family: More than three times a week     Attends Taoist Services: Patient declined     Active Member of Clubs or Organizations: Patient declined     Attends Club or Organization Meetings: Patient declined     Marital Status:    Housing Stability: Unknown (12/12/2023)    Housing Stability Vital Sign     Unable to Pay for Housing in the Last Year: No     Unstable Housing in the Last Year: No       Precautions:     Allergies as of 03/04/2024 - Reviewed 03/04/2024   Allergen Reaction Noted    Tomato (solanum lycopersicum) Hives 09/26/2013    Naproxen Hives 10/22/2012    Shrimp Other (See Comments) 03/07/2017       WO Assessment Details/Treatment     Patient seen for wound care consultation.   Reviewed chart for this encounter.   See Flow Sheet for findings.      RECOMMENDATIONS: RN consult for back, skin folds, and buttocks. Per primary RN, Patient is too unstable for inpatient wound care. Will follow up 3/7/2024.    Discussed POC with patient and primary nurse.   See EMR for orders & patient education.    Discussed nutrition and the role of protein in wound healing with the patient. Instructed patient to optimize protein for wound healing.    Bedside nursing to continue care & monitoring.  Bedside nursing to maintain pressure injury prevention interventions.            03/05/24 1035   WOCN Assessment   WOCN  Total Time (mins) 15   Visit Date 03/05/24   Visit Time 1035   Consult Type New   [facility] Wound   Intervention chart review   Teaching on-going       Orders placed.   Humberto Echeverria RN  03/05/2024

## 2024-03-05 NOTE — PROGRESS NOTES
[facility] - Cardiac Intensive Care  Critical Care Medicine  Progress Note    Patient Name: Bj Pate Jr.  MRN: [id number]  Admission Date: 3/4/2024  Hospital Length of Stay: 1 days  Code Status: Full Code  Attending Provider: Aiden Reyes*  Primary Care Provider: Jose Antonio Foster MD   Principal Problem: Hypothermia    Subjective:     HPI:  73M with PMH CVA with R sided deficits, DM, HTN who presents to the ED from SNF for AMS. Per chart, he was more somnolent than usual over the last 2 days. At baseline he is verbal and follows commands but does not ambulate. On arrival to ED he was hypothermic to 87.9F and found to be hypoglycemic to the 50s. Intermittently hypotensive to MAP 55. Patient is awake but does not interact meaningfully on exam. Chronic Rivera was initially draining cloudy urine with sediment, Rivera exchanged. Initial labs without leukocytosis, CMP unremarkable, VBG with pH 7.49/30.3/38.9, lactate normal. Received 1L NS bolus through warming pump. Initially admitted to , critical care consulted for severe hypothermia with hypotension.       Interval History/Significant Events: NAEON. Weaning propofol, plan to start tube feeds and wean D10 drip. EEG on.     Review of Systems   Unable to perform ROS: Dementia     Objective:     Vital Signs (Most Recent):  Temp: (!) 95.5 °F (35.3 °C) (03/05/24 1125)  Pulse: 81 (03/05/24 1220)  Resp: (!) 24 (03/05/24 1220)  BP: (!) 94/52 (03/05/24 1220)  SpO2: 100 % (03/05/24 1220) Vital Signs (24h Range):  Temp:  [93.7 °F (34.3 °C)-97.3 °F (36.3 °C)] 95.5 °F (35.3 °C)  Pulse:  [] 81  Resp:  [12-41] 24  SpO2:  [98 %-100 %] 100 %  BP: ()/(43-78) 94/52   Weight: 112.1 kg (247 lb 2.2 oz)  Body mass index is 36.5 kg/m².      Intake/Output Summary (Last 24 hours) at 3/5/2024 1320  Last data filed at 3/5/2024 1205  Gross per 24 hour   Intake 3202.68 ml   Output 880 ml   Net 2322.68 ml          Physical Exam  Vitals and nursing note reviewed.    Constitutional:       Appearance: He is well-developed. He is ill-appearing.   HENT:      Head: Normocephalic.      Mouth/Throat:      Mouth: Mucous membranes are moist.   Eyes:      Pupils: Pupils are equal, round, and reactive to light.   Cardiovascular:      Rate and Rhythm: Tachycardia present. Rhythm irregular.   Pulmonary:      Effort: Pulmonary effort is normal.   Abdominal:      General: Abdomen is flat.      Palpations: Abdomen is soft.   Musculoskeletal:         General: No swelling.   Skin:     General: Skin is warm and dry.            Vents:  Vent Mode: A/C (03/05/24 1125)  Set Rate: 14 BPM (03/05/24 1125)  Vt Set: 480 mL (03/05/24 1125)  PEEP/CPAP: 5 cmH20 (03/05/24 1125)  Oxygen Concentration (%): 30 (03/05/24 1220)  Peak Airway Pressure: 19 cmH20 (03/05/24 1125)  Plateau Pressure: 14 cmH20 (03/05/24 1125)  Total Ve: 6.95 L/m (03/05/24 1125)  Negative Inspiratory Force (cm H2O): 0 (03/05/24 1125)  F/VT Ratio<105 (RSBI): (!) 28.57 (03/05/24 1125)  Lines/Drains/Airways       Drain  Duration                  NG/OG Tube 03/04/24 1258 Center mouth 1 day         Urethral Catheter 03/04/24 0249 Silicone 16 Fr. 1 day              Airway  Duration                  Airway - Non-Surgical 03/04/24 1255 Endotracheal Tube 1 day              Peripheral Intravenous Line  Duration                  Peripheral IV - Single Lumen 20 G Anterior;Distal;Left Upper Arm -- days         Peripheral IV - Single Lumen 03/04/24 0215 20 G Anterior;Distal;Left Forearm 1 day         Peripheral IV - Single Lumen 03/04/24 18 G Right;Posterior Wrist 1 day                  Significant Labs:    CBC/Anemia Profile:  Recent Labs   Lab 03/04/24  0214 03/04/24  0354 03/04/24  0402 03/04/24  0404 03/04/24  0406 03/05/24  0232   WBC 3.65*  --  3.44*  --   --  7.05   HGB 8.7*  --  7.8*  --   --  9.0*   HCT 27.5*   < > 25.0* 26* 25* 27.6*   *  --  120*  --   --  165   MCV 85  --  87  --   --  84   RDW 17.0*  --  17.2*  --   --  17.5*    IRON  --   --  55  --   --   --    FERRITIN  --   --  910*  --   --   --    FOLATE  --   --  2.4*  --   --   --    [id number]  --   --  582  --   --   --     < > = values in this interval not displayed.        Chemistries:  Recent Labs   Lab 03/04/24  0214 03/04/24  0402 03/05/24  0232    141 136   K 4.4 4.3 4.0    111* 105   CO2 24 26 23   BUN 22 20 19   CREATININE 0.9 0.9 1.3   CALCIUM 9.1 8.6* 8.8   ALBUMIN 2.0* 1.8* 1.9*   PROT 6.6 5.7* 6.6   BILITOT 0.2 0.2 0.3   ALKPHOS 103 91 92   ALT 17 15 17   AST 23 21 17   MG 2.2 2.1 2.0   PHOS  --  3.4 2.2*       All pertinent labs within the past 24 hours have been reviewed.    Significant Imaging:  I have reviewed all pertinent imaging results/findings within the past 24 hours.      ABG  Recent Labs   Lab 03/05/24  0010   PH 7.487*   PO2 134*   PCO2 32.1*   HCO3 24.4   BE 1     Assessment/Plan:     Neuro  History of CVA (cerebrovascular accident)  Verbal and follows command at baseline, was not doing upon admission and for 2 days prior. Has right-sided deficits.       Patient intubated for airway protection and being unable to follow commands   MRI unrevealing   EEG monitoring ordered     ID  Sepsis  This patient does have evidence of infective focus  My overall impression is sepsis.  Source: Urinary Tract  Antibiotics given-   Antibiotics (72h ago, onward)      Start     Stop Route Frequency Ordered    03/04/24 1700  vancomycin 1,250 mg in dextrose 5 % (D5W) 250 mL IVPB (Vial-Mate)         -- IV Every 12 hours (non-standard times) 03/04/24 0406    03/04/24 0500  vancomycin 2 g in dextrose 5 % 500 mL IVPB         -- IV Once 03/04/24 0404    03/04/24 0430  piperacillin-tazobactam (ZOSYN) 4.5 g in dextrose 5 % in water (D5W) 100 mL IVPB (MB+)         -- IV Every 8 hours (non-standard times) 03/04/24 0320    03/04/24 0420  vancomycin - pharmacy to dose  (vancomycin IVPB (PEDS and ADULTS))        See Hyperspace for full Linked Orders Report.    -- IV pharmacy  to manage frequency 03/04/24 0320          Latest lactate reviewed-  Recent Labs   Lab 03/04/24  0224 03/04/24  0402   LACTATE  --  1.0   POCLAC 1.09  --      Organ dysfunction indicated by Encephalopathy    Fluid challenge Ideal Body Weight- The patient's ideal body weight is Ideal body weight: 70.7 kg (155 lb 13.8 oz) which will be used to calculate fluid bolus of 30 ml/kg for treatment of septic shock.      Post- resuscitation assessment No - Post resuscitation assessment not needed       Will Not start Pressors- Levophed for MAP of 65  Source control achieved by: IV abx    74M with hx CVA, osteomyelitis s/p neurosurgical intervention presents to [facility] from SNF for AMS x 2 days. Found to be hypothermic and hypoglycemic with intermittent readings of hypotension to MAP 55. Lactic acid 1. Suspect urinary source given cloudy urine draining from Rivera on presentation. S/p 1L warmed IVF, started on Vanc/Zosyn.    -continue Vanc/Zosyn  -complete sepsis fluids  -f/u blood cx, urine studies  -d10 gtt for hypoglycemia  -nikki hugger for hypothermia    Endocrine  Hypoglycemia  Suspect hypoglycemia may be due to infection. See 'sepsis'    Other  * Hypothermia  Pt's body temp was 87 degrees Farenheit on admission. Pt was shivering, hypoglycemia noted. S/p 1L warmed IVF, nikki hugger in place.      Critical Care Daily Checklist:      A: Awake: RASS Goal/Actual Goal:    Actual:     B: Spontaneous Breathing Trial Performed?    C: SAT & SBT Coordinated?  N/a                      D: Delirium: CAM-ICU    E: Early Mobility Performed? No   F: Feeding Goal:    Status:         Current Diet Order   Procedures    Diet NPO      AS: Analgesia/Sedation No    T: Thromboembolic Prophylaxis Yes    H: HOB > 300 Yes   U: Stress Ulcer Prophylaxis (if needed) No    G: Glucose Control No    B: Bowel Function    I: Indwelling Catheter (Lines & Rivera) Necessity Yes    D: De-escalation of Antimicrobials/Pharmacotherapies Not indicated     Plan for the  day/ETD Stabilize      Code Status:  Family/Goals of Care: Full Code          Critical secondary to Patient has a condition that poses threat to life and bodily function: Hypothermia, sepsis       Critical care was time spent personally by me on the following activities: development of treatment plan with patient or surrogate and bedside caregivers, discussions with consultants, evaluation of patient's response to treatment, examination of patient, ordering and performing treatments and interventions, ordering and review of laboratory studies, ordering and review of radiographic studies, pulse oximetry, re-evaluation of patient's condition. This critical care time did not overlap with that of any other provider or involve time for any procedures.     Abdirizak Carpenter DO  Critical Care Medicine  [facility] - Cardiac Intensive Care

## 2024-03-05 NOTE — ASSESSMENT & PLAN NOTE
Verbal and follows command at baseline, was not doing upon admission and for 2 days prior. Has right-sided deficits.       Patient intubated for airway protection and being unable to follow commands   MRI unrevealing   EEG monitoring ordered

## 2024-03-05 NOTE — SUBJECTIVE & OBJECTIVE
Interval History/Significant Events: NAEON. Weaning propofol, plan to start tube feeds and wean D10 drip. EEG on.     Review of Systems   Unable to perform ROS: Dementia     Objective:     Vital Signs (Most Recent):  Temp: (!) 95.5 °F (35.3 °C) (03/05/24 1125)  Pulse: 81 (03/05/24 1220)  Resp: (!) 24 (03/05/24 1220)  BP: (!) 94/52 (03/05/24 1220)  SpO2: 100 % (03/05/24 1220) Vital Signs (24h Range):  Temp:  [93.7 °F (34.3 °C)-97.3 °F (36.3 °C)] 95.5 °F (35.3 °C)  Pulse:  [] 81  Resp:  [12-41] 24  SpO2:  [98 %-100 %] 100 %  BP: ()/(43-78) 94/52   Weight: 112.1 kg (247 lb 2.2 oz)  Body mass index is 36.5 kg/m².      Intake/Output Summary (Last 24 hours) at 3/5/2024 1320  Last data filed at 3/5/2024 1205  Gross per 24 hour   Intake 3202.68 ml   Output 880 ml   Net 2322.68 ml          Physical Exam  Vitals and nursing note reviewed.   Constitutional:       Appearance: He is well-developed. He is ill-appearing.   HENT:      Head: Normocephalic.      Mouth/Throat:      Mouth: Mucous membranes are moist.   Eyes:      Pupils: Pupils are equal, round, and reactive to light.   Cardiovascular:      Rate and Rhythm: Tachycardia present. Rhythm irregular.   Pulmonary:      Effort: Pulmonary effort is normal.   Abdominal:      General: Abdomen is flat.      Palpations: Abdomen is soft.   Musculoskeletal:         General: No swelling.   Skin:     General: Skin is warm and dry.            Vents:  Vent Mode: A/C (03/05/24 1125)  Set Rate: 14 BPM (03/05/24 1125)  Vt Set: 480 mL (03/05/24 1125)  PEEP/CPAP: 5 cmH20 (03/05/24 1125)  Oxygen Concentration (%): 30 (03/05/24 1220)  Peak Airway Pressure: 19 cmH20 (03/05/24 1125)  Plateau Pressure: 14 cmH20 (03/05/24 1125)  Total Ve: 6.95 L/m (03/05/24 1125)  Negative Inspiratory Force (cm H2O): 0 (03/05/24 1125)  F/VT Ratio<105 (RSBI): (!) 28.57 (03/05/24 1125)  Lines/Drains/Airways       Drain  Duration                  NG/OG Tube 03/04/24 1258 Center mouth 1 day          Urethral Catheter 03/04/24 0249 Silicone 16 Fr. 1 day              Airway  Duration                  Airway - Non-Surgical 03/04/24 1255 Endotracheal Tube 1 day              Peripheral Intravenous Line  Duration                  Peripheral IV - Single Lumen 20 G Anterior;Distal;Left Upper Arm -- days         Peripheral IV - Single Lumen 03/04/24 0215 20 G Anterior;Distal;Left Forearm 1 day         Peripheral IV - Single Lumen 03/04/24 18 G Right;Posterior Wrist 1 day                  Significant Labs:    CBC/Anemia Profile:  Recent Labs   Lab 03/04/24 0214 03/04/24  0354 03/04/24 0402 03/04/24  0404 03/04/24  0406 03/05/24  0232   WBC 3.65*  --  3.44*  --   --  7.05   HGB 8.7*  --  7.8*  --   --  9.0*   HCT 27.5*   < > 25.0* 26* 25* 27.6*   *  --  120*  --   --  165   MCV 85  --  87  --   --  84   RDW 17.0*  --  17.2*  --   --  17.5*   IRON  --   --  55  --   --   --    FERRITIN  --   --  910*  --   --   --    FOLATE  --   --  2.4*  --   --   --    [id number]  --   --  582  --   --   --     < > = values in this interval not displayed.        Chemistries:  Recent Labs   Lab 03/04/24 0214 03/04/24 0402 03/05/24  0232    141 136   K 4.4 4.3 4.0    111* 105   CO2 24 26 23   BUN 22 20 19   CREATININE 0.9 0.9 1.3   CALCIUM 9.1 8.6* 8.8   ALBUMIN 2.0* 1.8* 1.9*   PROT 6.6 5.7* 6.6   BILITOT 0.2 0.2 0.3   ALKPHOS 103 91 92   ALT 17 15 17   AST 23 21 17   MG 2.2 2.1 2.0   PHOS  --  3.4 2.2*       All pertinent labs within the past 24 hours have been reviewed.    Significant Imaging:  I have reviewed all pertinent imaging results/findings within the past 24 hours.

## 2024-03-06 ENCOUNTER — DOCUMENTATION ONLY (OUTPATIENT)
Dept: NEUROLOGY | Facility: CLINIC | Age: 74
End: 2024-03-06
Payer: MEDICARE

## 2024-03-06 PROBLEM — I44.2 COMPLETE HEART BLOCK: Status: ACTIVE | Noted: 2024-03-06

## 2024-03-06 LAB
ALBUMIN SERPL BCP-MCNC: 1.7 G/DL (ref 3.5–5.2)
ALLENS TEST: ABNORMAL
ALP SERPL-CCNC: 85 U/L (ref 55–135)
ALT SERPL W/O P-5'-P-CCNC: 12 U/L (ref 10–44)
ANION GAP SERPL CALC-SCNC: 11 MMOL/L (ref 8–16)
ANION GAP SERPL CALC-SCNC: 8 MMOL/L (ref 8–16)
AST SERPL-CCNC: 15 U/L (ref 10–40)
BACTERIA UR CULT: ABNORMAL
BASOPHILS # BLD AUTO: 0.01 K/UL (ref 0–0.2)
BASOPHILS NFR BLD: 0.2 % (ref 0–1.9)
BILIRUB SERPL-MCNC: 0.3 MG/DL (ref 0.1–1)
BUN SERPL-MCNC: 17 MG/DL (ref 8–23)
BUN SERPL-MCNC: 18 MG/DL (ref 8–23)
CALCIUM SERPL-MCNC: 8.5 MG/DL (ref 8.7–10.5)
CALCIUM SERPL-MCNC: 8.7 MG/DL (ref 8.7–10.5)
CHLORIDE SERPL-SCNC: 103 MMOL/L (ref 95–110)
CHLORIDE SERPL-SCNC: 104 MMOL/L (ref 95–110)
CO2 SERPL-SCNC: 21 MMOL/L (ref 23–29)
CO2 SERPL-SCNC: 23 MMOL/L (ref 23–29)
CREAT SERPL-MCNC: 1.6 MG/DL (ref 0.5–1.4)
CREAT SERPL-MCNC: 1.6 MG/DL (ref 0.5–1.4)
DELSYS: ABNORMAL
DIFFERENTIAL METHOD BLD: ABNORMAL
EOSINOPHIL # BLD AUTO: 0.1 K/UL (ref 0–0.5)
EOSINOPHIL NFR BLD: 2 % (ref 0–8)
ERYTHROCYTE [DISTWIDTH] IN BLOOD BY AUTOMATED COUNT: 17.4 % (ref 11.5–14.5)
ERYTHROCYTE [SEDIMENTATION RATE] IN BLOOD BY WESTERGREN METHOD: 14 MM/H
EST. GFR  (NO RACE VARIABLE): 45.5 ML/MIN/1.73 M^2
EST. GFR  (NO RACE VARIABLE): 45.5 ML/MIN/1.73 M^2
FIO2: 30
GLUCOSE SERPL-MCNC: 122 MG/DL (ref 70–110)
GLUCOSE SERPL-MCNC: 149 MG/DL (ref 70–110)
HCO3 UR-SCNC: 23.9 MMOL/L (ref 24–28)
HCT VFR BLD AUTO: 24.8 % (ref 40–54)
HGB BLD-MCNC: 8.3 G/DL (ref 14–18)
IMM GRANULOCYTES # BLD AUTO: 0.02 K/UL (ref 0–0.04)
IMM GRANULOCYTES NFR BLD AUTO: 0.4 % (ref 0–0.5)
LYMPHOCYTES # BLD AUTO: 1.6 K/UL (ref 1–4.8)
LYMPHOCYTES NFR BLD: 32.8 % (ref 18–48)
MAGNESIUM SERPL-MCNC: 1.8 MG/DL (ref 1.6–2.6)
MAGNESIUM SERPL-MCNC: 1.8 MG/DL (ref 1.6–2.6)
MCH RBC QN AUTO: 27.4 PG (ref 27–31)
MCHC RBC AUTO-ENTMCNC: 33.5 G/DL (ref 32–36)
MCV RBC AUTO: 82 FL (ref 82–98)
MODE: ABNORMAL
MONOCYTES # BLD AUTO: 0.8 K/UL (ref 0.3–1)
MONOCYTES NFR BLD: 15 % (ref 4–15)
NEUTROPHILS # BLD AUTO: 2.5 K/UL (ref 1.8–7.7)
NEUTROPHILS NFR BLD: 49.6 % (ref 38–73)
NRBC BLD-RTO: 0 /100 WBC
OHS QRS DURATION: 70 MS
OHS QTC CALCULATION: 328 MS
PCO2 BLDA: 31.6 MMHG (ref 35–45)
PEEP: 5
PH SMN: 7.49 [PH] (ref 7.35–7.45)
PHOSPHATE SERPL-MCNC: 3 MG/DL (ref 2.7–4.5)
PLATELET # BLD AUTO: 135 K/UL (ref 150–450)
PMV BLD AUTO: 12.6 FL (ref 9.2–12.9)
PO2 BLDA: 126 MMHG (ref 80–100)
POC BE: 0 MMOL/L
POC SATURATED O2: 99 % (ref 95–100)
POC TCO2: 25 MMOL/L (ref 23–27)
POCT GLUCOSE: 107 MG/DL (ref 70–110)
POCT GLUCOSE: 114 MG/DL (ref 70–110)
POCT GLUCOSE: 115 MG/DL (ref 70–110)
POCT GLUCOSE: 121 MG/DL (ref 70–110)
POCT GLUCOSE: 127 MG/DL (ref 70–110)
POCT GLUCOSE: 144 MG/DL (ref 70–110)
POCT GLUCOSE: 147 MG/DL (ref 70–110)
POCT GLUCOSE: 88 MG/DL (ref 70–110)
POTASSIUM SERPL-SCNC: 4 MMOL/L (ref 3.5–5.1)
POTASSIUM SERPL-SCNC: 4.2 MMOL/L (ref 3.5–5.1)
PROT SERPL-MCNC: 6.2 G/DL (ref 6–8.4)
RBC # BLD AUTO: 3.03 M/UL (ref 4.6–6.2)
SAMPLE: ABNORMAL
SITE: ABNORMAL
SODIUM SERPL-SCNC: 135 MMOL/L (ref 136–145)
SODIUM SERPL-SCNC: 135 MMOL/L (ref 136–145)
VANCOMYCIN TROUGH SERPL-MCNC: 21.1 UG/ML (ref 10–22)
VT: 480
WBC # BLD AUTO: 5 K/UL (ref 3.9–12.7)

## 2024-03-06 PROCEDURE — 94761 N-INVAS EAR/PLS OXIMETRY MLT: CPT | Mod: XB

## 2024-03-06 PROCEDURE — 25000003 PHARM REV CODE 250: Performed by: INTERNAL MEDICINE

## 2024-03-06 PROCEDURE — 20000000 HC ICU ROOM

## 2024-03-06 PROCEDURE — 25000003 PHARM REV CODE 250

## 2024-03-06 PROCEDURE — 93005 ELECTROCARDIOGRAM TRACING: CPT

## 2024-03-06 PROCEDURE — 63600175 PHARM REV CODE 636 W HCPCS: Performed by: NURSE PRACTITIONER

## 2024-03-06 PROCEDURE — 63600175 PHARM REV CODE 636 W HCPCS

## 2024-03-06 PROCEDURE — 37799 UNLISTED PX VASCULAR SURGERY: CPT

## 2024-03-06 PROCEDURE — 84100 ASSAY OF PHOSPHORUS: CPT

## 2024-03-06 PROCEDURE — 80053 COMPREHEN METABOLIC PANEL: CPT

## 2024-03-06 PROCEDURE — 82803 BLOOD GASES ANY COMBINATION: CPT

## 2024-03-06 PROCEDURE — 02HV33Z INSERTION OF INFUSION DEVICE INTO SUPERIOR VENA CAVA, PERCUTANEOUS APPROACH: ICD-10-PCS | Performed by: INTERNAL MEDICINE

## 2024-03-06 PROCEDURE — C9113 INJ PANTOPRAZOLE SODIUM, VIA: HCPCS

## 2024-03-06 PROCEDURE — 83735 ASSAY OF MAGNESIUM: CPT

## 2024-03-06 PROCEDURE — 99900026 HC AIRWAY MAINTENANCE (STAT)

## 2024-03-06 PROCEDURE — 94003 VENT MGMT INPAT SUBQ DAY: CPT

## 2024-03-06 PROCEDURE — 80048 BASIC METABOLIC PNL TOTAL CA: CPT | Mod: XB

## 2024-03-06 PROCEDURE — 27100171 HC OXYGEN HIGH FLOW UP TO 24 HOURS

## 2024-03-06 PROCEDURE — 80202 ASSAY OF VANCOMYCIN: CPT | Performed by: INTERNAL MEDICINE

## 2024-03-06 PROCEDURE — 36620 INSERTION CATHETER ARTERY: CPT

## 2024-03-06 PROCEDURE — 27200966 HC CLOSED SUCTION SYSTEM

## 2024-03-06 PROCEDURE — 99900035 HC TECH TIME PER 15 MIN (STAT)

## 2024-03-06 PROCEDURE — 83735 ASSAY OF MAGNESIUM: CPT | Mod: 91

## 2024-03-06 PROCEDURE — 85025 COMPLETE CBC W/AUTO DIFF WBC: CPT

## 2024-03-06 RX ORDER — FENTANYL CITRATE 50 UG/ML
50 INJECTION, SOLUTION INTRAMUSCULAR; INTRAVENOUS
Status: DISCONTINUED | OUTPATIENT
Start: 2024-03-06 | End: 2024-03-06

## 2024-03-06 RX ORDER — FENTANYL CITRATE 50 UG/ML
100 INJECTION, SOLUTION INTRAMUSCULAR; INTRAVENOUS ONCE
Status: COMPLETED | OUTPATIENT
Start: 2024-03-06 | End: 2024-03-06

## 2024-03-06 RX ORDER — FENTANYL CITRATE-0.9 % NACL/PF 10 MCG/ML
0-200 PLASTIC BAG, INJECTION (ML) INTRAVENOUS CONTINUOUS
Status: DISCONTINUED | OUTPATIENT
Start: 2024-03-06 | End: 2024-03-08

## 2024-03-06 RX ORDER — FENTANYL CITRATE 50 UG/ML
INJECTION, SOLUTION INTRAMUSCULAR; INTRAVENOUS
Status: COMPLETED
Start: 2024-03-06 | End: 2024-03-06

## 2024-03-06 RX ORDER — MAGNESIUM SULFATE HEPTAHYDRATE 40 MG/ML
2 INJECTION, SOLUTION INTRAVENOUS ONCE
Status: COMPLETED | OUTPATIENT
Start: 2024-03-06 | End: 2024-03-06

## 2024-03-06 RX ORDER — POLYETHYLENE GLYCOL 3350 17 G/17G
17 POWDER, FOR SOLUTION ORAL DAILY
Status: DISCONTINUED | OUTPATIENT
Start: 2024-03-06 | End: 2024-03-07

## 2024-03-06 RX ORDER — PROPOFOL 10 MG/ML
INJECTION, EMULSION INTRAVENOUS
Status: DISPENSED
Start: 2024-03-06 | End: 2024-03-06

## 2024-03-06 RX ORDER — MICONAZOLE NITRATE 2 %
POWDER (GRAM) TOPICAL 2 TIMES DAILY
Status: DISCONTINUED | OUTPATIENT
Start: 2024-03-06 | End: 2024-03-27 | Stop reason: HOSPADM

## 2024-03-06 RX ORDER — FENTANYL CITRATE 50 UG/ML
100 INJECTION, SOLUTION INTRAMUSCULAR; INTRAVENOUS
Status: DISCONTINUED | OUTPATIENT
Start: 2024-03-06 | End: 2024-03-06

## 2024-03-06 RX ADMIN — MICONAZOLE NITRATE 2 % TOPICAL POWDER: at 08:03

## 2024-03-06 RX ADMIN — FENTANYL CITRATE 50 MCG: 50 INJECTION INTRAMUSCULAR; INTRAVENOUS at 03:03

## 2024-03-06 RX ADMIN — FENTANYL CITRATE 100 MCG: 50 INJECTION, SOLUTION INTRAMUSCULAR; INTRAVENOUS at 05:03

## 2024-03-06 RX ADMIN — HEPARIN SODIUM 5000 UNITS: 5000 INJECTION INTRAVENOUS; SUBCUTANEOUS at 03:03

## 2024-03-06 RX ADMIN — DEXTROSE MONOHYDRATE: 100 INJECTION, SOLUTION INTRAVENOUS at 11:03

## 2024-03-06 RX ADMIN — PANTOPRAZOLE SODIUM 40 MG: 40 INJECTION, POWDER, FOR SOLUTION INTRAVENOUS at 08:03

## 2024-03-06 RX ADMIN — PIPERACILLIN SODIUM AND TAZOBACTAM SODIUM 4.5 G: 4; .5 INJECTION, POWDER, FOR SOLUTION INTRAVENOUS at 05:03

## 2024-03-06 RX ADMIN — SILODOSIN 4 MG: 4 CAPSULE ORAL at 08:03

## 2024-03-06 RX ADMIN — NOREPINEPHRINE BITARTRATE 0.08 MCG/KG/MIN: 4 INJECTION, SOLUTION INTRAVENOUS at 11:03

## 2024-03-06 RX ADMIN — NOREPINEPHRINE BITARTRATE 0.08 MCG/KG/MIN: 4 INJECTION, SOLUTION INTRAVENOUS at 05:03

## 2024-03-06 RX ADMIN — NOREPINEPHRINE BITARTRATE 0.1 MCG/KG/MIN: 4 INJECTION, SOLUTION INTRAVENOUS at 06:03

## 2024-03-06 RX ADMIN — HEPARIN SODIUM 5000 UNITS: 5000 INJECTION INTRAVENOUS; SUBCUTANEOUS at 05:03

## 2024-03-06 RX ADMIN — PIPERACILLIN SODIUM AND TAZOBACTAM SODIUM 4.5 G: 4; .5 INJECTION, POWDER, FOR SOLUTION INTRAVENOUS at 08:03

## 2024-03-06 RX ADMIN — DEXTROSE MONOHYDRATE: 100 INJECTION, SOLUTION INTRAVENOUS at 05:03

## 2024-03-06 RX ADMIN — HEPARIN SODIUM 5000 UNITS: 5000 INJECTION INTRAVENOUS; SUBCUTANEOUS at 10:03

## 2024-03-06 RX ADMIN — MAGNESIUM SULFATE HEPTAHYDRATE 2 G: 40 INJECTION, SOLUTION INTRAVENOUS at 09:03

## 2024-03-06 RX ADMIN — ATORVASTATIN CALCIUM 40 MG: 40 TABLET, FILM COATED ORAL at 08:03

## 2024-03-06 RX ADMIN — PIPERACILLIN SODIUM AND TAZOBACTAM SODIUM 4.5 G: 4; .5 INJECTION, POWDER, FOR SOLUTION INTRAVENOUS at 11:03

## 2024-03-06 RX ADMIN — FENTANYL CITRATE 100 MCG: 50 INJECTION INTRAMUSCULAR; INTRAVENOUS at 05:03

## 2024-03-06 RX ADMIN — Medication 200 MCG/HR: at 11:03

## 2024-03-06 RX ADMIN — FENTANYL CITRATE 100 MCG: 50 INJECTION, SOLUTION INTRAMUSCULAR; INTRAVENOUS at 04:03

## 2024-03-06 RX ADMIN — Medication 25 MCG/HR: at 06:03

## 2024-03-06 RX ADMIN — FENTANYL CITRATE 100 MCG: 50 INJECTION INTRAMUSCULAR; INTRAVENOUS at 03:03

## 2024-03-06 RX ADMIN — POLYETHYLENE GLYCOL 3350 17 G: 17 POWDER, FOR SOLUTION ORAL at 03:03

## 2024-03-06 RX ADMIN — FENTANYL CITRATE 100 MCG: 50 INJECTION INTRAMUSCULAR; INTRAVENOUS at 04:03

## 2024-03-06 NOTE — SUBJECTIVE & OBJECTIVE
Interval History/Significant Events: Concern for complete heart block. EP consulted overnight, cordis and A line placed overnight. Prop turned off, on fentanyl drip.  Consider LP. Vanc d/c'd per urine cx. On zosyn.   Review of Systems   Unable to perform ROS: Intubated     Objective:     Vital Signs (Most Recent):  Temp: 96.3 °F (35.7 °C) (03/06/24 0905)  Pulse: 66 (03/06/24 0905)  Resp: 15 (03/06/24 0905)  BP: (!) 134/52 (03/06/24 0600)  SpO2: 100 % (03/06/24 0905) Vital Signs (24h Range):  Temp:  [95.4 °F (35.2 °C)-99.5 °F (37.5 °C)] 96.3 °F (35.7 °C)  Pulse:  [] 66  Resp:  [14-40] 15  SpO2:  [98 %-100 %] 100 %  BP: ()/() 134/52  Arterial Line BP: (105-147)/(46-68) 109/48   Weight: 111.9 kg (246 lb 11.1 oz)  Body mass index is 36.43 kg/m².      Intake/Output Summary (Last 24 hours) at 3/6/2024 0939  Last data filed at 3/6/2024 0905  Gross per 24 hour   Intake 3655.49 ml   Output 5665 ml   Net -2009.51 ml          Physical Exam  Vitals and nursing note reviewed.   Constitutional:       Appearance: He is well-developed. He is ill-appearing.   HENT:      Head: Normocephalic.      Mouth/Throat:      Mouth: Mucous membranes are moist.   Eyes:      Pupils: Pupils are equal, round, and reactive to light.   Cardiovascular:      Rate and Rhythm: Normal rate. Rhythm irregular.   Pulmonary:      Effort: Pulmonary effort is normal.   Abdominal:      General: Abdomen is flat.      Palpations: Abdomen is soft.   Musculoskeletal:         General: No swelling.   Skin:     General: Skin is warm and dry.            Vents:  Vent Mode: A/C (03/06/24 0735)  Set Rate: 14 BPM (03/06/24 0735)  Vt Set: 480 mL (03/06/24 0735)  Pressure Support: 5 cmH20 (03/05/24 1431)  PEEP/CPAP: 5 cmH20 (03/06/24 0735)  Oxygen Concentration (%): 30 (03/06/24 0905)  Peak Airway Pressure: 21 cmH20 (03/06/24 0735)  Plateau Pressure: 14 cmH20 (03/06/24 0735)  Total Ve: 6.63 L/m (03/06/24 0735)  Negative Inspiratory Force (cm H2O): 0  (03/06/24 0735)  F/VT Ratio<105 (RSBI): (!) 67.37 (03/06/24 0735)  Lines/Drains/Airways       Central Venous Catheter Line  Duration             Introducer 03/06/24 0454 Internal Jugular Right <1 day              Drain  Duration                  Urethral Catheter 03/04/24 0249 Silicone 16 Fr. 2 days         NG/OG Tube 03/04/24 1258 Center mouth 1 day              Airway  Duration                  Airway - Non-Surgical 03/04/24 1255 Endotracheal Tube 1 day              Arterial Line  Duration             Arterial Line 03/06/24 0258 Left Radial <1 day              Peripheral Intravenous Line  Duration                  Peripheral IV - Single Lumen 20 G Anterior;Distal;Left Upper Arm -- days         Peripheral IV - Single Lumen 03/04/24 0215 20 G Anterior;Distal;Left Forearm 2 days         Peripheral IV - Single Lumen 03/04/24 18 G Right;Posterior Wrist 2 days                  Significant Labs:    CBC/Anemia Profile:  Recent Labs   Lab 03/05/24  0232 03/06/24  0542   WBC 7.05 5.00   HGB 9.0* 8.3*   HCT 27.6* 24.8*    135*   MCV 84 82   RDW 17.5* 17.4*        Chemistries:  Recent Labs   Lab 03/05/24  0232 03/06/24  0037 03/06/24  0542    135* 135*   K 4.0 4.2 4.0    103 104   CO2 23 21* 23   BUN 19 17 18   CREATININE 1.3 1.6* 1.6*   CALCIUM 8.8 8.7 8.5*   ALBUMIN 1.9*  --  1.7*   PROT 6.6  --  6.2   BILITOT 0.3  --  0.3   ALKPHOS 92  --  85   ALT 17  --  12   AST 17  --  15   MG 2.0 1.8 1.8   PHOS 2.2*  --  3.0       All pertinent labs within the past 24 hours have been reviewed.    Significant Imaging:  I have reviewed all pertinent imaging results/findings within the past 24 hours.

## 2024-03-06 NOTE — SUBJECTIVE & OBJECTIVE
Past Medical History:   Diagnosis Date    Acute on chronic systolic heart failure 12/13/2023    Anemia 06/04/2021    Anticoagulant long-term use     Basal ganglia hemorrhage     Left basal ganglia hemorrhage with resultant right-sided hemiparesis which has resolved.     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        Past Surgical History:   Procedure Laterality Date    CARPAL TUNNEL RELEASE Left 2/19/2020    Procedure: RELEASE, CARPAL TUNNEL LEFT;  Surgeon: Maria Luisa Mccurdy MD;  Location: [facility] OR;  Service: Orthopedics;  Laterality: Left;    COLONOSCOPY N/A 7/28/2023    Procedure: COLONOSCOPY;  Surgeon: Jaylene Alvarado MD;  Location: [facility] ENDO;  Service: Endoscopy;  Laterality: N/A;    EPIDURAL STEROID INJECTION N/A 5/2/2019    Procedure: Injection, Steroid, Epidural Cervical;  Surgeon: Dariel Doan Jr., MD;  Location: [facility] ENDO;  Service: Pain Management;  Laterality: N/A;  Cervical Epidural Steroid Injection     [id number]    Arrive @ 1130; ASA; Check BG    EPIDURAL STEROID INJECTION Bilateral 5/29/2019    Procedure: Lumbar Medial Branch Blocks;  Surgeon: Dariel Doan Jr., MD;  Location: [facility] ENDO;  Service: Pain Management;  Laterality: Bilateral;  Bilateral Lumbar Medial Branch Blocks L3, L4, L5    [id number]  [id number]    Attive @ 1030 (11 arrival request); NO Sedation; ASA; Check BG    EPIDURAL STEROID INJECTION Bilateral 7/3/2019    Procedure: Lumbar  Medial Branch Blocks;  Surgeon: Dariel Doan Jr., MD;  Location: [facility] ENDO;  Service: Pain Management;  Laterality: Bilateral;  Bilateral Lumbar Medial Branch Blocks L3, L4, L5    [id number]  [id number]    Arrive @ 0930; NO Sedation; ASA; Check BG    EPIDURAL STEROID INJECTION N/A 8/7/2019    Procedure: Injection, Steroid, Epidural Cervical;  Surgeon: Dariel Doan Jr., MD;  Location: [facility] ENDO;  Service: Pain Management;  Laterality: N/A;  Cervical Epidural Steroid Injection C7-T1    [id number]    Arrive @ 1115; Last ASA 7/30; Check BG    ESOPHAGOGASTRODUODENOSCOPY N/A 7/28/2023    Procedure: EGD (ESOPHAGOGASTRODUODENOSCOPY);  Surgeon: Jaylene Alvarado MD;  Location: [facility] ENDO;  Service: Endoscopy;  Laterality: N/A;  inst via portal    FUSION, SPINE, LUMBAR, TLIF, POSTERIOR APPROACH, USING PEDICLE SCREW N/A 12/15/2023    Procedure: L-1 TO L-4 FUSION, SPINE, LUMBAR, TLIF, POSTERIOR APPROACH, USING PEDICLE SCREW;  Surgeon: Rolf Rincon MD;  Location: [facility] OR [address] [place] [address];  Service: Neurosurgery;  Laterality: N/A;    LEFT HEART CATHETERIZATION Left 9/21/2021    Procedure: Left heart cath 9am start, R rad access;  Surgeon: Dariel Conner MD;  Location: [facility] CATH LAB;  Service: Cardiology;  Laterality: Left;  RN PRE OP Covid NEGATIVE ON  9-20-21.  C A    MIDLINE CATHETER PLACEMENT  12/12/2023    NO PAST SURGERIES         Review of patient's allergies indicates:   Allergen Reactions    Tomato (solanum lycopersicum) Hives    Naproxen Hives    Shrimp Other (See Comments)       No current facility-administered medications on file prior to encounter.     Current Outpatient Medications on File Prior to Encounter   Medication Sig    aspirin (ECOTRIN) 81 MG EC tablet Take 81 mg by mouth once daily.    atorvastatin (LIPITOR) 40 MG tablet Take 1 tablet (40 mg total) by mouth once daily.    blood sugar diagnostic Strp To check BG 2 times daily, to use with insurance preferred meter    colchicine (COLCRYS) 0.6 mg tablet Take 1  tablet (0.6 mg total) by mouth every Mon, Wed, Fri.    fluticasone-salmeterol diskus inhaler 500-50 mcg Inhale 1 puff into the lungs 2 (two) times daily. Controller    furosemide (LASIX) 80 MG tablet Take 1 tablet (80 mg total) by mouth once daily.    hydroCHLOROthiazide (HYDRODIURIL) 25 MG tablet Take 25 mg by mouth once daily.    Lactobacillus rhamnosus GG (CULTURELLE) 10 billion cell capsule 1 capsule by Per G Tube route once daily.    lancets Misc To check BG 2 times daily, to use with insurance preferred meter    losartan (COZAAR) 25 MG tablet Take 1 tablet (25 mg total) by mouth once daily.    nystatin (MYCOSTATIN) 100,000 unit/mL suspension Take 6 mLs (600,000 Units total) by mouth 4 (four) times daily. for 10 days    potassium bicarbonate (K-LYTE) disintegrating tablet 1 tablet (25 mEq total) by Per G Tube route once daily. Stop 1/4/2024. May take orally if NG tube not available    psyllium husk, aspartame, (METAMUCIL) 3.4 gram PwPk packet 1 packet by Per NG tube route 2 (two) times daily.    tamsulosin (FLOMAX) 0.4 mg Cap Take 0.4 mg by mouth once daily.     Family History       Problem Relation (Age of Onset)    Diabetes Paternal Grandfather    Heart disease Paternal Grandfather    Hypertension     No Known Problems Mother, Father, Sister, Brother, Maternal Aunt, Maternal Uncle, Paternal Aunt, Paternal Uncle, Maternal Grandmother, Maternal Grandfather, Paternal Grandmother          Tobacco Use    Smoking status: Never    Smokeless tobacco: Never   Substance and Sexual Activity    Alcohol use: Yes     Alcohol/week: 0.0 standard drinks of alcohol     Comment: occacionally    Drug use: No    Sexual activity: Not Currently     Review of Systems   Unable to perform ROS: Intubated     Objective:     Vital Signs (Most Recent):  Temp: 97 °F (36.1 °C) (03/06/24 0315)  Pulse: 81 (03/06/24 0315)  Resp: 20 (03/06/24 0315)  BP: 125/63 (03/06/24 0300)  SpO2: 100 % (03/06/24 0315) Vital Signs (24h Range):  Temp:  [93.7  °F (34.3 °C)-99.5 °F (37.5 °C)] 97 °F (36.1 °C)  Pulse:  [] 81  Resp:  [14-40] 20  SpO2:  [98 %-100 %] 100 %  BP: ()/() 125/63  Arterial Line BP: (120-147)/(46-61) 147/61       Weight: 112.1 kg (247 lb 2.2 oz)  Body mass index is 36.5 kg/m².    SpO2: 100 %        Physical Exam  Constitutional:       Appearance: He is ill-appearing.   HENT:      Head: Normocephalic and atraumatic.      Mouth/Throat:      Mouth: Mucous membranes are moist.   Eyes:      Conjunctiva/sclera: Conjunctivae normal.   Cardiovascular:      Rate and Rhythm: Normal rate and regular rhythm.   Pulmonary:      Comments: Mechanical breath sounds  Abdominal:      General: Abdomen is flat.   Skin:     Capillary Refill: Capillary refill takes less than 2 seconds.   Neurological:      Comments: Sedated            Significant Labs: All pertinent lab results from the last 24 hours have been reviewed.

## 2024-03-06 NOTE — CARE UPDATE
CCS Attending    This AM pt was noted to have AMS with apneic and hypoxic episodes. He was noted to have a decreased GCS, bradypnea, hypothermia and pinpoint pupils.  TSH was not markedly elevated  He was give narcan to a total of 2mg IV without significant change.   Decision was made to intubate for decreased GCS, and facilitation of imaging exams. He was given a total of 2mg of narcan. Fentanyl screen sent.   MRI brain ordered.     Of note, family states he has had these episodes of severe delirium when being treated for infections.    # Acute metabolic encephalopathy  # Acute hypoxic respiratory failure    Critical care time 35 min    Aiden Reyes MD  Critical Care Medicine  [facility]

## 2024-03-06 NOTE — HPI
73-year-old male with past medical history of CVA with right-sided deficits, type 2 diabetes mellitus, hypertension who presented from skilled nursing facility on 3/4 for altered mental status.  Per chart review patient had been more somnolent over the prior 2 days.  On arrival to the emergency room he was noted to be severely hypothermic, hypoglycemic and and hypotensive.  He was arousable but unable to follow commands.  Lab work was grossly unremarkable and lactic acid within normal limits.  He was given IV fluids with improvement of hypotension and started on broad-spectrum ABX for empiric sepsis treatment.  Initially admitted to hospital medicine but quickly transferred to the medical ICU due to continued hypotension requiring vasopressors and was intubated for airway protection.  His infectious workup is only significant for Gram-negative rods on urine culture.  Overall etiology remains unclear at this time.  During his hospitalization he has had numerous episodes of bradycardia down to 30 beats per minute.  EKG tonight concerning for complete heart block so electrophysiology was consulted.  Unclear if his episodes of complete heart block are associated with hypotension as he does not have an a line.  Remains on moderate dose Levophed.

## 2024-03-06 NOTE — CONSULTS
[facility] - Cardiac Intensive Care  Wound Care    Patient Name:  Bj Pate Jr.   MRN:  [id number]  Date: 3/6/2024  Diagnosis: Hypothermia    History:     Past Medical History:   Diagnosis Date    Acute on chronic systolic heart failure 12/13/2023    Anemia 06/04/2021    Anticoagulant long-term use     Basal ganglia hemorrhage     Left basal ganglia hemorrhage with resultant right-sided hemiparesis which has resolved.     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        Social History     Socioeconomic History    Marital status:     Number of children: 8    Highest education level: 11th grade   Occupational History    Occupation:    Tobacco Use    Smoking status: Never    Smokeless tobacco: Never   Substance and Sexual Activity    Alcohol use: Yes     Alcohol/week: 0.0 standard drinks of alcohol     Comment: occacionally    Drug use: No    Sexual activity: Not Currently     Social Determinants of Health     Financial Resource Strain: Low Risk  (3/5/2024)    Overall Financial Resource Strain (CARDIA)     Difficulty of Paying Living Expenses: Not very hard   Food Insecurity: No Food Insecurity (3/5/2024)    Hunger Vital Sign     Worried About Running Out of Food in the Last Year: Never true     Ran Out of Food in the Last Year: Never true   Transportation Needs: No Transportation Needs (3/5/2024)    PRAPARE - Transportation     Lack of  Transportation (Medical): No     Lack of Transportation (Non-Medical): No   Physical Activity: Inactive (3/5/2024)    Exercise Vital Sign     Days of Exercise per Week: 0 days     Minutes of Exercise per Session: 0 min   Stress: Patient Declined (12/12/2023)    [employer] [place] [employer] - Occupational Stress Questionnaire     Feeling of Stress : Patient declined   Social Connections: Unknown (3/5/2024)    Social Connection and Isolation Panel [NHANES]     Frequency of Communication with Friends and Family: More than three times a week     Frequency of Social Gatherings with Friends and Family: More than three times a week     Attends Taoist Services: Patient declined     Active Member of Clubs or Organizations: Patient declined     Attends Club or Organization Meetings: Patient declined     Marital Status:    Housing Stability: Unknown (3/5/2024)    Housing Stability Vital Sign     Unable to Pay for Housing in the Last Year: No     Unstable Housing in the Last Year: No       Precautions:     Allergies as of 03/04/2024 - Reviewed 03/04/2024   Allergen Reaction Noted    Tomato (solanum lycopersicum) Hives 09/26/2013    Naproxen Hives 10/22/2012    Shrimp Other (See Comments) 03/07/2017       [facility] Assessment Details/Treatment     Patient seen for wound care consultation.   Reviewed chart for this encounter.   See Flow Sheet for findings.      RECOMMENDATIONS: Primary RN at bedside and agreeable to inpatient wound care. RN consult for sacrum. Sacrum noted to have moisture associated breakdown. Per chart, present on admission. Cleanse with sterile normal saline and pat dry. Apply mepilex foam border dressing. Change every three days or PRN if soiled. Groin and abdominal skin folds noted to have fungal associated breakdown. Apply antifungal powder BID. No other issues or concerns at this time. Will follow up 3/13/2024 or sooner if needed. Heels clean, dry, and intact but EHOB boots ordered for  pressure prevention.    Discussed POC with patient and primary nurse.   See EMR for orders & patient education.    Discussed nutrition and the role of protein in wound healing with the patient. Instructed patient to optimize protein for wound healing.    Bedside nursing to continue care & monitoring.  Bedside nursing to maintain pressure injury prevention interventions.            03/06/24 1000   WOCN Assessment   WOCN Total Time (mins) 30   Visit Date 03/06/24   Visit Time 1000   Consult Type New   Intervention assessed;applied;changed;chart review;orders;coordination of care   Teaching on-going        Altered Skin Integrity 03/04/24 0215 midline Sacral spine #1 Partial thickness tissue loss. Shallow open ulcer with a red or pink wound bed, without slough. Intact or Open/Ruptured Serum-filled blister.   Date First Assessed/Time First Assessed: 03/04/24 0215   Altered Skin Integrity Present on Admission - Did Patient arrive to the hospital with altered skin?: yes  Orientation: midline  Location: Sacral spine  Wound Number: #1  Description of Altered S...   Wound Image    Dressing Appearance Dry;Intact;Clean;Open to air   Drainage Amount Scant   Drainage Characteristics/Odor Serosanguineous   Appearance Pink;Red   Care Cleansed with:;Sterile normal saline   Dressing Applied;Foam   Dressing Change Due 03/09/24                     Orders placed.   Humberto Echeverria RN  03/06/2024

## 2024-03-06 NOTE — ASSESSMENT & PLAN NOTE
73-year-old male with past medical history of CVA with right-sided deficits, type 2 diabetes mellitus, hypertension who presented from skilled nursing facility on 3/4 for AMS, hypothermia and undifferentiated shock requiring pressors.During his hospitalization he has had numerous episodes of bradycardia down to 30 beats per minute.  EKG/tele tonight concerning for intermittent complete heart block.  Episodes appear to be correlated with hypothermia.     Recommendations  Intermittent complete heart block likely related to hypothermia.  Recommend placing arterial line to monitor blood pressures during episodes of complete heart block  Would discontinue propofol and use other sedation as this could be worsening his conduction issues  Will hold off on TVP at this time given clear reversible causes and does not appear to be correlated to pressor requirements; however would place central line, preferably 6 Fijian Cordis, given his pressor requirements and if TVP is urgently needed  Continue aggressively treating potential underlying causes of shock and correct hypothermia  Avoid AV ar blockers  Maintain telemetry, Mag greater than 2, K> 4

## 2024-03-06 NOTE — CONSULTS
VANCOMYCIN DOSING BY PHARMACY DISCONTINUATION NOTE    Bj Pate Jr. is a 74 y.o. male who had been consulted for vancomycin dosing.    The pharmacy consult for vancomycin dosing has been discontinued.     Vancomycin Dosing by Pharmacy Consult will sign-off. Please reconsult if necessary. Thank you for allowing us to participate in this patient's care.     Traci Shell, PharmD   [phone]

## 2024-03-06 NOTE — PROCEDURES
"Bj Pate Jr. is a 74 y.o. male patient.    Temp: 97 °F (36.1 °C) (03/06/24 0315)  Pulse: 81 (03/06/24 0315)  Resp: 15 (03/06/24 0400)  BP: 125/63 (03/06/24 0300)  SpO2: 100 % (03/06/24 0315)  Weight: 112.1 kg (247 lb 2.2 oz) (03/05/24 0400)  Height: 5' 9" (175.3 cm) (03/04/24 2012)       Central Line    Date/Time: 3/6/2024 5:09 AM    Performed by: Rolf Zabala DO  Authorized by: Rolf Zabala DO    Supervisor Name:  abel Orozco  Location procedure was performed:  [facility] CRITICAL CARE MEDICINE  Assisting Provider:  Rolf Zabala DO  Pre-operative diagnosis:  Complete heart block  Post-operative diagnosis:  Complete heart heart  Consent Done ?:  Yes  Time out complete?: Verified correct patient, procedure, equipment, staff, and site/side    Indications:  Hemodynamic monitoring  Anesthesia:  Local infiltration  Local anesthetic:  Lidocaine 1% without epinephrine  Preparation:  Skin prepped with ChloraPrep  Skin prep agent dried: Skin prep agent completely dried prior to procedure    Sterile barriers: All five maximal sterile barriers used - gloves, gown, cap, mask and large sterile sheet    Hand hygiene: Hand hygiene performed immediately prior to central venous catheter insertion    Location:  Right internal jugular  Catheter type:  Cordis  Catheter size:  6 Fr  Inserted Catheter Length (cm):  20  Ultrasound guidance: Yes    Vessel Caliber:  Medium  Comprressibility:  Normal  Manometry: No    Number of attempts:  4  Securement:  Line sutured, chlorhexidine patch, sterile dressing applied and blood return through all ports  Complications: No    Estimated blood loss (mL):  15  Specimens: No    Implants: No    XRay:  Placement verified by x-ray, no pneumothorax on x-ray, tip termination and successful placement  Adverse Events:  NoneTermination Site: right atrium      3/6/2024    "

## 2024-03-06 NOTE — ASSESSMENT & PLAN NOTE
Concern for complete heart block overnight. EP consulted. Cordis and A line in place.     Plan:  - A line, cordis in place

## 2024-03-06 NOTE — PROGRESS NOTES
EEG Disconnect  Mild skin breakdown noticed at Fp1    Skin Integrity: Mild     Abisai Li   03/06/2024 11:04 AM

## 2024-03-06 NOTE — CARE UPDATE
Brief EP Note  Reassessed old telemetry from 3/5/24 and overnight rhythm strips this morning. Has episodes of bradycardia to the 30s and periods of AV block with prolonging IA interval and occasional dropped QRS. These rhythms may resemble Wenckebach, although a higher degree AV block could be present. Will monitor overnight for any high degree AV blocks. Continue to avoid AV ar blocking and bradycardic agents. Will continue to follow up tomorrow and discuss necessity of cordis for pacing.    Marino Castro MD  Cardiovascular Medicine; PGY4  [facility]

## 2024-03-06 NOTE — PROCEDURES
EEG REPORT      Bj Pate Jr.  [id number]  1950    DATE OF SERVICE: 3/5/2024         METHODOLOGY      Extended electroencephalographic recording is made while the patient is ambulatory and continuing normal daily activities.  Electrodes are placed according to the International 10-20 placement system and included T1 and T2 electrode placement.  Twenty four (24) channels of digital signal (sampling rate of 512/sec) was simultaneously recorded from the scalp including EKG and eye monitors.  Recording band pass was 0.1 to 100 hz and all data was stored digitally on the recorder.  The patient is instructed to press an event button when clinical symptoms occur and write the symptoms into a diary. Activation procedures which include photic stimulation, hyperventilation and instructing patients to perform simple task are done in selected patients.        The EEG is displayed on a monitor screen and can be reformatted into different montages for evaluation.  The entire recoding is submitted for computer assisted analysis to detect spike and electrographic seizure activity.  The entire recording is visually reviewed and the times identified by computer analysis as being spikes or seizures are reviewed again.  Compresses spectral analysis (CSA) is also performed on the activity recorded from each individual channel.  This is displayed as a power display of frequencies from 0 to 30 Hz over time.   The CSA analysis is done and displayed continuously.  This is reviewed for asymmetries in power between homologous areas of the scalp and for presence of changes in power which canbe seen when seizures occur.  Sections of suspected abnormalities on the CSA is then compared with the original EEG recording.  .     iJento software was also utilized in the review of this study.  This software suite analyzes the EEG recording in multiple domains.  Coherence and rhythmicity is computed to identify EEG sections which may  contain organized seizures.  Each channel undergoes analysis to detect presence of spike and sharp waves which have special and morphological characteristic of epileptic activity.  The routine EEG recording is converted from spacial into frequency domain.  This is then displayed comparing homologous areas to identify areas of significant asymmetry.  Algorithm to identify non-cortically generated artifact is used to separate eye movement, EMG and other artifact from the EEG     Recording Times  Start on 3/5/2024  Stop on 3/6/2024    A total of 20:38:18 and 3:53:47 hours of EEG was recorded.      EEG FINDINGS:  Background activity:   The background rhythm was characterized by poorly organized alpha and theta activity with absent posterior dominant alpha rhythm.   Symmetry and continuity: the background was continuous and symmetric     Sleep:   Normal sleep transients including sleep spindles, K complexes, vertex waves were seen although sleep was poorly staged.    Activation procedures:   NA    Abnormal activity:   No epileptiform discharges, periodic discharges, lateralized rhythmic delta activity or electrographic seizures were seen.    IMPRESSION:   Abnormal EEG due to a mild generalized cerebral dysfunction with no electrographic seizures or indications of seizure tendency.      Prabhakar Holley MD  Neurology-Epilepsy.  [facility]

## 2024-03-06 NOTE — PLAN OF CARE
Recommendations  1. Novasource Renal @ 30 ml/hr to provide 1440 kcals, 65 g of protein, and 516 ml of fluid   2. RD following     Goals: Meet % of EEN/EPN by RD f/u date  Nutrition Goal Status: goal not met  Communication of RD Recs: POC

## 2024-03-06 NOTE — PROCEDURES
"Bj Pate Jr. is a 74 y.o. male patient.    Temp: 97 °F (36.1 °C) (24)  Pulse: 81 (24)  Resp: 15 (24)  BP: 125/63 (24 0300)  SpO2: 100 % (24)  Weight: 112.1 kg (247 lb 2.2 oz) (24)  Height: 5' 9" (175.3 cm) (24)       Arterial Line    Date/Time: 3/6/2024 5:05 AM  Location procedure was performed: [facility] CRITICAL CARE MEDICINE    Performed by: Pam Carter MD  Authorized by: Pam Carter MD  Assisting provider: Graciela Lynch DNP  Consent Done: Yes  Consent: Written consent obtained.  Risks and benefits: risks, benefits and alternatives were discussed  Consent given by: power of   Patient identity confirmed: , MRN and name  Time out: Immediately prior to procedure a "time out" was called to verify the correct patient, procedure, equipment, support staff and site/side marked as required.  Preparation: Patient was prepped and draped in the usual sterile fashion.  Indications: hemodynamic monitoring  Location: left radial    Patient sedated: yes  Sedation type: deep sedation    Sedatives: fentanyl and propofol  Juan's test normal: yes  Needle gauge: 20  Number of attempts: 1  Complications: No  Estimated blood loss (mL): 1  Specimens: No  Implants: No  Post-procedure: line sutured and dressing applied  Patient tolerance: Patient tolerated the procedure well with no immediate complications          3/6/2024    "

## 2024-03-06 NOTE — PLAN OF CARE
Pt stable over night with times of bradycardia/ complete heart block, right introducer place,  left arterial line placed, still no improvement in neuro status, no witnessed seizure activity.

## 2024-03-06 NOTE — PROGRESS NOTES
[facility] - [facility]  Critical Care Medicine  Progress Note    Patient Name: Bj Pate Jr.  MRN: [id number]  Admission Date: 3/4/2024  Hospital Length of Stay: 2 days  Code Status: Full Code  Attending Provider: Aiden Reyes*  Primary Care Provider: Jose Antonio Foster MD   Principal Problem: Hypothermia    Subjective:     HPI:  73M with PMH CVA with R sided deficits, DM, HTN who presents to the ED from SNF for AMS. Per chart, he was more somnolent than usual over the last 2 days. At baseline he is verbal and follows commands but does not ambulate. On arrival to ED he was hypothermic to 87.9F and found to be hypoglycemic to the 50s. Intermittently hypotensive to MAP 55. Patient is awake but does not interact meaningfully on exam. Chronic Rivera was initially draining cloudy urine with sediment, Rivera exchanged. Initial labs without leukocytosis, CMP unremarkable, VBG with pH 7.49/30.3/38.9, lactate normal. Received 1L NS bolus through warming pump. Initially admitted to , critical care consulted for severe hypothermia with hypotension.     Hospital/ICU Course:  No notes on file    Interval History/Significant Events: Concern for complete heart block. EP consulted overnight, cordis and A line placed overnight. Prop turned off, on fentanyl drip.  Consider LP. Vanc d/c'd per urine cx. On zosyn.   Review of Systems   Unable to perform ROS: Intubated     Objective:     Vital Signs (Most Recent):  Temp: 96.3 °F (35.7 °C) (03/06/24 0905)  Pulse: 66 (03/06/24 0905)  Resp: 15 (03/06/24 0905)  BP: (!) 134/52 (03/06/24 0600)  SpO2: 100 % (03/06/24 0905) Vital Signs (24h Range):  Temp:  [95.4 °F (35.2 °C)-99.5 °F (37.5 °C)] 96.3 °F (35.7 °C)  Pulse:  [] 66  Resp:  [14-40] 15  SpO2:  [98 %-100 %] 100 %  BP: ()/() 134/52  Arterial Line BP: (105-147)/(46-68) 109/48   Weight: 111.9 kg (246 lb 11.1 oz)  Body mass index is 36.43 kg/m².      Intake/Output Summary (Last 24 hours) at 3/6/2024  0939  Last data filed at 3/6/2024 0905  Gross per 24 hour   Intake 3655.49 ml   Output 5665 ml   Net -2009.51 ml          Physical Exam  Vitals and nursing note reviewed.   Constitutional:       Appearance: He is well-developed. He is ill-appearing.   HENT:      Head: Normocephalic.      Mouth/Throat:      Mouth: Mucous membranes are moist.   Eyes:      Pupils: Pupils are equal, round, and reactive to light.   Cardiovascular:      Rate and Rhythm: Normal rate. Rhythm irregular.   Pulmonary:      Effort: Pulmonary effort is normal.   Abdominal:      General: Abdomen is flat.      Palpations: Abdomen is soft.   Musculoskeletal:         General: No swelling.   Skin:     General: Skin is warm and dry.            Vents:  Vent Mode: A/C (03/06/24 0735)  Set Rate: 14 BPM (03/06/24 0735)  Vt Set: 480 mL (03/06/24 0735)  Pressure Support: 5 cmH20 (03/05/24 1431)  PEEP/CPAP: 5 cmH20 (03/06/24 0735)  Oxygen Concentration (%): 30 (03/06/24 0905)  Peak Airway Pressure: 21 cmH20 (03/06/24 0735)  Plateau Pressure: 14 cmH20 (03/06/24 0735)  Total Ve: 6.63 L/m (03/06/24 0735)  Negative Inspiratory Force (cm H2O): 0 (03/06/24 0735)  F/VT Ratio<105 (RSBI): (!) 67.37 (03/06/24 0735)  Lines/Drains/Airways       Central Venous Catheter Line  Duration             Introducer 03/06/24 0454 Internal Jugular Right <1 day              Drain  Duration                  Urethral Catheter 03/04/24 0249 Silicone 16 Fr. 2 days         NG/OG Tube 03/04/24 1258 Center mouth 1 day              Airway  Duration                  Airway - Non-Surgical 03/04/24 1255 Endotracheal Tube 1 day              Arterial Line  Duration             Arterial Line 03/06/24 0258 Left Radial <1 day              Peripheral Intravenous Line  Duration                  Peripheral IV - Single Lumen 20 G Anterior;Distal;Left Upper Arm -- days         Peripheral IV - Single Lumen 03/04/24 0215 20 G Anterior;Distal;Left Forearm 2 days         Peripheral IV - Single Lumen  03/04/24 18 G Right;Posterior Wrist 2 days                  Significant Labs:    CBC/Anemia Profile:  Recent Labs   Lab 03/05/24  0232 03/06/24  0542   WBC 7.05 5.00   HGB 9.0* 8.3*   HCT 27.6* 24.8*    135*   MCV 84 82   RDW 17.5* 17.4*        Chemistries:  Recent Labs   Lab 03/05/24  0232 03/06/24  0037 03/06/24  0542    135* 135*   K 4.0 4.2 4.0    103 104   CO2 23 21* 23   BUN 19 17 18   CREATININE 1.3 1.6* 1.6*   CALCIUM 8.8 8.7 8.5*   ALBUMIN 1.9*  --  1.7*   PROT 6.6  --  6.2   BILITOT 0.3  --  0.3   ALKPHOS 92  --  85   ALT 17  --  12   AST 17  --  15   MG 2.0 1.8 1.8   PHOS 2.2*  --  3.0       All pertinent labs within the past 24 hours have been reviewed.    Significant Imaging:  I have reviewed all pertinent imaging results/findings within the past 24 hours.    ABG  Recent Labs   Lab 03/06/24  0517   PH 7.486*   PO2 126*   PCO2 31.6*   HCO3 23.9*   BE 0     Assessment/Plan:     Neuro  History of CVA (cerebrovascular accident)  Verbal and follows command at baseline, was not doing upon admission and for 2 days prior. Has right-sided deficits.       Patient intubated for airway protection and being unable to follow commands   MRI unrevealing   EEG monitoring ordered     Cardiac/Vascular  Complete heart block  Concern for complete heart block overnight. EP consulted. Cordis and A line in place.     Plan:  - A line, cordis in place     ID  Sepsis  This patient does have evidence of infective focus  My overall impression is sepsis.  Source: Urinary Tract  Antibiotics given-   Antibiotics (72h ago, onward)      Start     Stop Route Frequency Ordered    03/04/24 1700  vancomycin 1,250 mg in dextrose 5 % (D5W) 250 mL IVPB (Vial-Mate)         -- IV Every 12 hours (non-standard times) 03/04/24 0406    03/04/24 0500  vancomycin 2 g in dextrose 5 % 500 mL IVPB         -- IV Once 03/04/24 0404    03/04/24 0430  piperacillin-tazobactam (ZOSYN) 4.5 g in dextrose 5 % in water (D5W) 100 mL IVPB (MB+)          -- IV Every 8 hours (non-standard times) 03/04/24 0320    03/04/24 0420  vancomycin - pharmacy to dose  (vancomycin IVPB (PEDS and ADULTS))        See Giovannypace for full Linked Orders Report.    -- IV pharmacy to manage frequency 03/04/24 0320          Latest lactate reviewed-  Recent Labs   Lab 03/04/24  0224 03/04/24  0402   LACTATE  --  1.0   POCLAC 1.09  --      Organ dysfunction indicated by Encephalopathy    Fluid challenge Ideal Body Weight- The patient's ideal body weight is Ideal body weight: 70.7 kg (155 lb 13.8 oz) which will be used to calculate fluid bolus of 30 ml/kg for treatment of septic shock.      Post- resuscitation assessment No - Post resuscitation assessment not needed       Will Not start Pressors- Levophed for MAP of 65  Source control achieved by: IV abx    74M with hx CVA, osteomyelitis s/p neurosurgical intervention presents to [facility] from SNF for AMS x 2 days. Found to be hypothermic and hypoglycemic with intermittent readings of hypotension to MAP 55. Lactic acid 1. Suspect urinary source given cloudy urine draining from Rivera on presentation. S/p 1L warmed IVF, started on Vanc/Zosyn.    -continue Vanc/Zosyn  -complete sepsis fluids  -f/u blood cx, urine studies  -d10 gtt for hypoglycemia  -nikki hugger for hypothermia    Endocrine  Hypoglycemia  Suspect hypoglycemia may be due to infection. See 'sepsis'    Other  * Hypothermia  Pt's body temp was 87 degrees Farenheit on admission. Pt was shivering, hypoglycemia noted. S/p 1L warmed IVF, nikki hugger in place.      Critical Care Daily Checklist:        A: Awake: RASS Goal/Actual Goal:    Actual:      B: Spontaneous Breathing Trial Performed?      C: SAT & SBT Coordinated?  N/a                       D: Delirium: CAM-ICU      E: Early Mobility Performed? No    F: Feeding Goal:    Status:           Current Diet Order   Procedures    Diet NPO      AS: Analgesia/Sedation No     T: Thromboembolic Prophylaxis Yes     H: HOB > 300 Yes    U:  Stress Ulcer Prophylaxis (if needed) No     G: Glucose Control No     B: Bowel Function      I: Indwelling Catheter (Lines & Rivera) Necessity Yes     D: De-escalation of Antimicrobials/Pharmacotherapies Not indicated      Plan for the day/ETD Stabilize       Code Status:  Family/Goals of Care: Full Code         Critical secondary to Patient has a condition that poses threat to life and bodily function: Hypothermia       Critical care was time spent personally by me on the following activities: development of treatment plan with patient or surrogate and bedside caregivers, discussions with consultants, evaluation of patient's response to treatment, examination of patient, ordering and performing treatments and interventions, ordering and review of laboratory studies, ordering and review of radiographic studies, pulse oximetry, re-evaluation of patient's condition. This critical care time did not overlap with that of any other provider or involve time for any procedures.     Abdirizka Carpenter,   Critical Care Medicine  [facility] - Cardiac [facility]

## 2024-03-06 NOTE — PLAN OF CARE
POC reviewed with Bj Pate Jr. and family. Questions and concerns addressed. Pt did have a episode of bradycardia early this morning, since then no jennifer episodes. See MAR for medication adjustments. Tube feedings were started today in hope to keep BG appropriate, rate is currently 20mL/hr. Pt does move around in bed with Prop at 10, MD did order PRN Fent injections. Pt did well on Spontaneous Vent settings today but is unable to follow purposeful commands. Warming blanket was on for most of the day but is now off as to pt temp is at 99 fahrenheit. See below and flowsheets for full assessment and VS info.       Neuro:  Jaja Coma Scale  Best Eye Response: 2-->(E2) to pain  Best Motor Response: 3-->(M3) flexion to pain  Best Verbal Response: 1-->(V1) none  Jaja Coma Scale Score: 6  Assessment Qualifiers: patient chemically sedated or paralyzed, patient intubated  Pupil PERRLA: yes    24 hr Temp:  [93.7 °F (34.3 °C)-99.5 °F (37.5 °C)]     CV:  Rhythm: normal sinus rhythm   DVT prophylaxis: VTE Required Core Measure: Pharmacological prophylaxis initiated/maintained                            Pulses  Right Radial Pulse: 1+ (weak)  Left Radial Pulse: 1+ (weak)  Right Dorsalis Pedis Pulse: 1+ (weak)  Left Dorsalis Pedis Pulse: 2+ (normal)  Right Posterior Tibial Pulse: 1+ (weak)  Left Posterior Tibial Pulse: 1+ (weak)    Resp:     Vent Mode: Spont  Set Rate: 14 BPM  Oxygen Concentration (%): 30  Vt Set: 480 mL  PEEP/CPAP: 5 cmH20  Pressure Support: 5 cmH20    GI/:  GI prophylaxis: yes  Diet/Nutrition Received: NPO  Last Bowel Movement:  (unknown)  Voiding Characteristics: urethral catheter (bladder)       Urethral Catheter 03/04/24 0249 Silicone 16 Fr.-Reason for Continuing Urinary Catheterization: Critically ill in ICU and requiring hourly monitoring of intake/output   Intake/Output Summary (Last 24 hours) at 3/5/2024 1814  Last data filed at 3/5/2024 1705  Gross per 24 hour   Intake 3323.27 ml   Output 1215  "ml   Net 2108.27 ml        Nutritional Supplement Intake: Quantity 20 mL/hr, Type: [place]strand Renal    Labs/Accuchecks:  Recent Labs   Lab 03/04/24  0214 03/04/24  0354 03/04/24  0402 03/04/24  0404 03/04/24  0406 03/05/24  0232   WBC 3.65*  --  3.44*  --   --  7.05   RBC 3.23*  --  2.88*  --   --  3.29*   HGB 8.7*  --  7.8*  --   --  9.0*   HCT 27.5*   < > 25.0* 26* 25* 27.6*   *  --  120*  --   --  165    < > = values in this interval not displayed.    No results for input(s): "PT", "INR", "APTT" in the last 168 hours.   Recent Labs     03/05/24  0232      K 4.0   CO2 23      BUN 19   CREATININE 1.3   ALKPHOS 92   ALT 17   AST 17   BILITOT 0.3       Recent Labs   Lab 03/04/24  0402   CPK 53      Recent Labs     03/04/24 2028 03/04/24  2211 03/05/24  0010   PH 7.514* 7.558* 7.487*   PCO2 29.0* 28.1* 32.1*   PO2 56 52 134*   HCO3 23.3* 25.0 24.4   POCSATURATED 92 91 99   BE 0 3* 1       Electrolytes: No replacement orders  Accuchecks: Q2H    Gtts/LDAs:   dextrose 10 % in water (D10W) 75 mL/hr at 03/05/24 1705    NORepinephrine bitartrate-D5W 0.1 mcg/kg/min (03/05/24 1705)    propofoL 10 mcg/kg/min (03/05/24 1705)       Lines/Drains/Airways       Drain  Duration                  NG/OG Tube 03/04/24 1258 Center mouth 1 day         Urethral Catheter 03/04/24 0249 Silicone 16 Fr. 1 day              Airway  Duration                  Airway - Non-Surgical 03/04/24 1255 Endotracheal Tube 1 day              Peripheral Intravenous Line  Duration                  Peripheral IV - Single Lumen 20 G Anterior;Distal;Left Upper Arm -- days         Peripheral IV - Single Lumen 03/04/24 0215 20 G Anterior;Distal;Left Forearm 1 day         Peripheral IV - Single Lumen 03/04/24 18 G Right;Posterior Wrist 1 day                    Skin/Wounds  Bathing/Skin Care: bath, complete;dressed/undressed;incontinence care (03/04/24 0701)  Wounds: Yes  Wound care consulted: Yes  "

## 2024-03-06 NOTE — CONSULTS
[facility] - Cardiac Int[facility]  Cardiac Electrophysiology  Consult Note    Admission Date: 3/4/2024  Code Status: Full Code   Attending Provider: Adien Reyes*  Consulting Provider: You Mckenna MD  Principal Problem:Hypothermia    Inpatient consult to Electrophysiology  Consult performed by: You Mckenna MD  Consult ordered by: Pam Carter MD        Subjective:     Chief Complaint:  AMS     HPI:   73-year-old male with past medical history of CVA with right-sided deficits, type 2 diabetes mellitus, hypertension who presented from skilled nursing facility on 3/4 for altered mental status.  Per chart review patient had been more somnolent over the prior 2 days.  On arrival to the emergency room he was noted to be severely hypothermic, hypoglycemic and and hypotensive.  He was arousable but unable to follow commands.  Lab work was grossly unremarkable and lactic acid within normal limits.  He was given IV fluids with improvement of hypotension and started on broad-spectrum ABX for empiric sepsis treatment.  Initially admitted to hospital medicine but quickly transferred to the medical ICU due to continued hypotension requiring vasopressors and was intubated for airway protection.  His infectious workup is only significant for Gram-negative rods on urine culture.  Overall etiology remains unclear at this time.  During his hospitalization he has had numerous episodes of bradycardia down to 30 beats per minute.  EKG tonight concerning for complete heart block so electrophysiology was consulted.  Unclear if his episodes of complete heart block are associated with hypotension as he does not have an a line.  Remains on moderate dose Levophed.    Past Medical History:   Diagnosis Date    Acute on chronic systolic heart failure 12/13/2023    Anemia 06/04/2021    Anticoagulant long-term use     Basal ganglia hemorrhage     Left basal ganglia hemorrhage with resultant right-sided hemiparesis which has  resolved.     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        Past Surgical History:   Procedure Laterality Date    CARPAL TUNNEL RELEASE Left 2/19/2020    Procedure: RELEASE, CARPAL TUNNEL LEFT;  Surgeon: Maria Luisa Mccurdy MD;  Location: [facility] OR;  Service: Orthopedics;  Laterality: Left;    COLONOSCOPY N/A 7/28/2023    Procedure: COLONOSCOPY;  Surgeon: Jaylene Alvarado MD;  Location: [facility] ENDO;  Service: Endoscopy;  Laterality: N/A;    EPIDURAL STEROID INJECTION N/A 5/2/2019    Procedure: Injection, Steroid, Epidural Cervical;  Surgeon: Dariel Doan Jr., MD;  Location: [facility] ENDO;  Service: Pain Management;  Laterality: N/A;  Cervical Epidural Steroid Injection     97403    Arrive @ 1130; ASA; Check BG    EPIDURAL STEROID INJECTION Bilateral 5/29/2019    Procedure: Lumbar Medial Branch Blocks;  Surgeon: Dariel Doan Jr., MD;  Location: [facility];  Service: Pain Management;  Laterality: Bilateral;  Bilateral Lumbar Medial Branch Blocks L3, L4, L5    51407  21372    Attive @ 1030 (11 arrival request); NO Sedation; ASA; Check BG    EPIDURAL STEROID INJECTION Bilateral 7/3/2019    Procedure: Lumbar Medial Branch Blocks;  Surgeon: Dariel Doan Jr., MD;  Location: [facility];  Service: Pain Management;  Laterality: Bilateral;  Bilateral Lumbar Medial Branch Blocks L3, L4, L5    10664  73510    Arrive @ 0930; NO Sedation; ASA; Check BG    EPIDURAL  STEROID INJECTION N/A 8/7/2019    Procedure: Injection, Steroid, Epidural Cervical;  Surgeon: Dariel Doan Jr., MD;  Location: [facility] ENDO;  Service: Pain Management;  Laterality: N/A;  Cervical Epidural Steroid Injection C7-T1    79061    Arrive @ 1115; Last ASA 7/30; Check BG    ESOPHAGOGASTRODUODENOSCOPY N/A 7/28/2023    Procedure: EGD (ESOPHAGOGASTRODUODENOSCOPY);  Surgeon: Jaylene Alvarado MD;  Location: [facility] ENDO;  Service: Endoscopy;  Laterality: N/A;  inst via portal    FUSION, SPINE, LUMBAR, TLIF, POSTERIOR APPROACH, USING PEDICLE SCREW N/A 12/15/2023    Procedure: L-1 TO L-4 FUSION, SPINE, LUMBAR, TLIF, POSTERIOR APPROACH, USING PEDICLE SCREW;  Surgeon: Rolf Rincon MD;  Location: [facility] OR [address] [place] [address];  Service: Neurosurgery;  Laterality: N/A;    LEFT HEART CATHETERIZATION Left 9/21/2021    Procedure: Left heart cath 9am start, R rad access;  Surgeon: Dariel Conner MD;  Location: [facility] CATH LAB;  Service: Cardiology;  Laterality: Left;  RN PRE OP Covid NEGATIVE ON  9-20-21.  C A    MIDLINE CATHETER PLACEMENT  12/12/2023    NO PAST SURGERIES         Review of patient's allergies indicates:   Allergen Reactions    Tomato (solanum lycopersicum) Hives    Naproxen Hives    Shrimp Other (See Comments)       No current facility-administered medications on file prior to encounter.     Current Outpatient Medications on File Prior to Encounter   Medication Sig    aspirin (ECOTRIN) 81 MG EC tablet Take 81 mg by mouth once daily.    atorvastatin (LIPITOR) 40 MG tablet Take 1 tablet (40 mg total) by mouth once daily.    blood sugar diagnostic Strp To check BG 2 times daily, to use with insurance preferred meter    colchicine (COLCRYS) 0.6 mg tablet Take 1 tablet (0.6 mg total) by mouth every Mon, Wed, Fri.    fluticasone-salmeterol diskus inhaler 500-50 mcg Inhale 1 puff into the lungs 2 (two) times daily. Controller    furosemide (LASIX) 80 MG tablet Take 1 tablet (80 mg total) by mouth once daily.     hydroCHLOROthiazide (HYDRODIURIL) 25 MG tablet Take 25 mg by mouth once daily.    Lactobacillus rhamnosus GG (CULTURELLE) 10 billion cell capsule 1 capsule by Per G Tube route once daily.    lancets Misc To check BG 2 times daily, to use with insurance preferred meter    losartan (COZAAR) 25 MG tablet Take 1 tablet (25 mg total) by mouth once daily.    nystatin (MYCOSTATIN) 100,000 unit/mL suspension Take 6 mLs (600,000 Units total) by mouth 4 (four) times daily. for 10 days    potassium bicarbonate (K-LYTE) disintegrating tablet 1 tablet (25 mEq total) by Per G Tube route once daily. Stop 1/4/2024. May take orally if NG tube not available    psyllium husk, aspartame, (METAMUCIL) 3.4 gram PwPk packet 1 packet by Per NG tube route 2 (two) times daily.    tamsulosin (FLOMAX) 0.4 mg Cap Take 0.4 mg by mouth once daily.     Family History       Problem Relation (Age of Onset)    Diabetes Paternal Grandfather    Heart disease Paternal Grandfather    Hypertension     No Known Problems Mother, Father, Sister, Brother, Maternal Aunt, Maternal Uncle, Paternal Aunt, Paternal Uncle, Maternal Grandmother, Maternal Grandfather, Paternal Grandmother          Tobacco Use    Smoking status: Never    Smokeless tobacco: Never   Substance and Sexual Activity    Alcohol use: Yes     Alcohol/week: 0.0 standard drinks of alcohol     Comment: occacionally    Drug use: No    Sexual activity: Not Currently     Review of Systems   Unable to perform ROS: Intubated     Objective:     Vital Signs (Most Recent):  Temp: 97 °F (36.1 °C) (03/06/24 0315)  Pulse: 81 (03/06/24 0315)  Resp: 20 (03/06/24 0315)  BP: 125/63 (03/06/24 0300)  SpO2: 100 % (03/06/24 0315) Vital Signs (24h Range):  Temp:  [93.7 °F (34.3 °C)-99.5 °F (37.5 °C)] 97 °F (36.1 °C)  Pulse:  [] 81  Resp:  [14-40] 20  SpO2:  [98 %-100 %] 100 %  BP: ()/() 125/63  Arterial Line BP: (120-147)/(46-61) 147/61       Weight: 112.1 kg (247 lb 2.2 oz)  Body mass index is 36.5  kg/m².    SpO2: 100 %        Physical Exam  Constitutional:       Appearance: He is ill-appearing.   HENT:      Head: Normocephalic and atraumatic.      Mouth/Throat:      Mouth: Mucous membranes are moist.   Eyes:      Conjunctiva/sclera: Conjunctivae normal.   Cardiovascular:      Rate and Rhythm: Normal rate and regular rhythm.   Pulmonary:      Comments: Mechanical breath sounds  Abdominal:      General: Abdomen is flat.   Skin:     Capillary Refill: Capillary refill takes less than 2 seconds.   Neurological:      Comments: Sedated            Significant Labs: All pertinent lab results from the last 24 hours have been reviewed.                Assessment and Plan:     Complete heart block  73-year-old male with past medical history of CVA with right-sided deficits, type 2 diabetes mellitus, hypertension who presented from skilled nursing facility on 3/4 for AMS, hypothermia and undifferentiated shock requiring pressors.During his hospitalization he has had numerous episodes of bradycardia down to 30 beats per minute.  EKG/tele tonight concerning for intermittent complete heart block.  Episodes appear to be correlated with hypothermia.     Recommendations  Intermittent complete heart block likely related to hypothermia.  Recommend placing arterial line to monitor blood pressures during episodes of complete heart block  Would discontinue propofol and use other sedation as this could be worsening his conduction issues  Will hold off on TVP at this time given clear reversible causes and does not appear to be correlated to pressor requirements; however would place central line, preferably 6 Amharic Cordis, given his pressor requirements and if TVP is urgently needed  Continue aggressively treating potential underlying causes of shock and correct hypothermia  Avoid AV ar blockers  Maintain telemetry, Mag greater than 2, K> 4          You Mckenna MD  Cardiac Electrophysiology  [facility] - Cardiac Int[facility]

## 2024-03-06 NOTE — CONSULTS
"  [facility] - Cardiac Intensive Care  Adult Nutrition  Consult Note    SUMMARY     Recommendations  1. Novasource Renal @ 30 ml/hr to provide 1440 kcals, 65 g of protein, and 516 ml of fluid 2. RD following    Goals: Meet % of EEN/EPN by RD f/u date  Nutrition Goal Status: goal not met  Communication of RD Recs: POC    Assessment and Plan    Nutrition Problem  Inadequate energy intake     Related to (etiology):   Physiological needs     Signs and Symptoms (as evidenced by):   NPO status     Interventions (treatment strategy):  Collaboration of nutrition care w/ other providers     Nutrition Diagnosis Status:   New        Reason for Assessment    Reason For Assessment: consult  Diagnosis: other (see comments)  Relevant Medical History: CKD stage 3, stroke  Interdisciplinary Rounds: did not attend  General Information Comments: RD consulted for tube feeds. Unsure of nutrition hx PTA. Unable to complete, NFPE will complete at follow-up.  Novasource Renal @ 20 ml/hr ordered. RD following  Nutrition Discharge Planning: pending clinical course    Nutrition Risk Screen    Nutrition Risk Screen: other (see comments) (intubated)    Nutrition/Diet History    Food Allergies: other (see comments) (shrimp)    Anthropometrics    Temp: 96.6 °F (35.9 °C)  Height: 5' 9" (175.3 cm)  Height (inches): 69 in  Weight Method: Bed Scale  Weight: 111.9 kg (246 lb 11.1 oz)  Weight (lb): 246.7 lb  Ideal Body Weight (IBW), Male: 160 lb  % Ideal Body Weight, Male (lb): 155.63 %  BMI (Calculated): 36.4  BMI Grade: 35 - 39.9 - obesity - grade II       Lab/Procedures/Meds    Pertinent Labs Reviewed: reviewed  Pertinent Labs Comments: Sodium 135, GFR 46.2  Pertinent Medications Reviewed: reviewed  Pertinent Medications Comments: -    Estimated/Assessed Needs    Weight Used For Calorie Calculations: 72 kg (158 lb 11.7 oz)  Energy Calorie Requirements (kcal): 1460  Energy Need Method: Searcy-St Jeor (PAL 1.0 IBW of 72 kg)  Protein Requirements: " 144-180 g (1.2-1.5 g/kg)  Weight Used For Protein Calculations: 119.9 kg (264 lb 5.3 oz)        RDA Method (mL): 1460         Nutrition Prescription Ordered    Current Diet Order: NPO status    Evaluation of Received Nutrient/Fluid Intake    I/O: -  Energy Calories Required: not meeting needs  Protein Required: not meeting needs  Fluid Required: not meeting needs  Total Fluid Intake (mL/kg): 1 ml or fluid per MD  Tolerance: tolerating  % Intake of Estimated Energy Needs: 0 - 25 %  % Meal Intake: NPO    Nutrition Risk    Level of Risk/Frequency of Follow-up: low ((2x/week))     Monitor and Evaluation    Food and Nutrient Intake: energy intake, food and beverage intake  Food and Nutrient Adminstration: diet order, enteral and parenteral nutrition administration  Knowledge/Beliefs/Attitudes: food and nutrition knowledge/skill, beliefs and attitudes  Physical Activity and Function: nutrition-related ADLs and IADLs, factors affecting access to physical activity  Anthropometric Measurements: height/length, weight, weight change, other (specify), growth pattern indices/percentile ranks, body mass index  Biochemical Data, Medical Tests and Procedures: electrolyte and renal panel, gastrointestinal profile, glucose/endocrine profile, inflammatory profile, lipid profile  Nutrition-Focused Physical Findings: overall appearance, extremities, muscles and bones, head and eyes, skin       Nutrition Follow-Up    RD Follow-up?: Yes

## 2024-03-07 LAB
ALBUMIN SERPL BCP-MCNC: 1.7 G/DL (ref 3.5–5.2)
ALBUMIN SERPL BCP-MCNC: 1.7 G/DL (ref 3.5–5.2)
ALLENS TEST: ABNORMAL
ALP SERPL-CCNC: 95 U/L (ref 55–135)
ALP SERPL-CCNC: 97 U/L (ref 55–135)
ALT SERPL W/O P-5'-P-CCNC: 10 U/L (ref 10–44)
ALT SERPL W/O P-5'-P-CCNC: 12 U/L (ref 10–44)
ANION GAP SERPL CALC-SCNC: 6 MMOL/L (ref 8–16)
ANION GAP SERPL CALC-SCNC: 6 MMOL/L (ref 8–16)
AST SERPL-CCNC: 14 U/L (ref 10–40)
AST SERPL-CCNC: 15 U/L (ref 10–40)
BASOPHILS # BLD AUTO: 0.02 K/UL (ref 0–0.2)
BASOPHILS NFR BLD: 0.5 % (ref 0–1.9)
BILIRUB SERPL-MCNC: 0.3 MG/DL (ref 0.1–1)
BILIRUB SERPL-MCNC: 0.3 MG/DL (ref 0.1–1)
BUN SERPL-MCNC: 17 MG/DL (ref 8–23)
BUN SERPL-MCNC: 17 MG/DL (ref 8–23)
CALCIUM SERPL-MCNC: 8.9 MG/DL (ref 8.7–10.5)
CALCIUM SERPL-MCNC: 9 MG/DL (ref 8.7–10.5)
CHLORIDE SERPL-SCNC: 104 MMOL/L (ref 95–110)
CHLORIDE SERPL-SCNC: 104 MMOL/L (ref 95–110)
CO2 SERPL-SCNC: 24 MMOL/L (ref 23–29)
CO2 SERPL-SCNC: 24 MMOL/L (ref 23–29)
CREAT SERPL-MCNC: 1.5 MG/DL (ref 0.5–1.4)
CREAT SERPL-MCNC: 1.5 MG/DL (ref 0.5–1.4)
DELSYS: ABNORMAL
DIFFERENTIAL METHOD BLD: ABNORMAL
EOSINOPHIL # BLD AUTO: 0.2 K/UL (ref 0–0.5)
EOSINOPHIL NFR BLD: 4.4 % (ref 0–8)
ERYTHROCYTE [DISTWIDTH] IN BLOOD BY AUTOMATED COUNT: 17.2 % (ref 11.5–14.5)
ERYTHROCYTE [SEDIMENTATION RATE] IN BLOOD BY WESTERGREN METHOD: 16 MM/H
EST. GFR  (NO RACE VARIABLE): 49.2 ML/MIN/1.73 M^2
EST. GFR  (NO RACE VARIABLE): 49.2 ML/MIN/1.73 M^2
FIO2: 30
GLUCOSE SERPL-MCNC: 101 MG/DL (ref 70–110)
GLUCOSE SERPL-MCNC: 89 MG/DL (ref 70–110)
HCO3 UR-SCNC: 25.2 MMOL/L (ref 24–28)
HCT VFR BLD AUTO: 25 % (ref 40–54)
HGB BLD-MCNC: 8 G/DL (ref 14–18)
IMM GRANULOCYTES # BLD AUTO: 0.02 K/UL (ref 0–0.04)
IMM GRANULOCYTES NFR BLD AUTO: 0.5 % (ref 0–0.5)
LYMPHOCYTES # BLD AUTO: 1.2 K/UL (ref 1–4.8)
LYMPHOCYTES NFR BLD: 28.8 % (ref 18–48)
MAGNESIUM SERPL-MCNC: 2 MG/DL (ref 1.6–2.6)
MAGNESIUM SERPL-MCNC: 2.1 MG/DL (ref 1.6–2.6)
MCH RBC QN AUTO: 27 PG (ref 27–31)
MCHC RBC AUTO-ENTMCNC: 32 G/DL (ref 32–36)
MCV RBC AUTO: 85 FL (ref 82–98)
MODE: ABNORMAL
MONOCYTES # BLD AUTO: 0.7 K/UL (ref 0.3–1)
MONOCYTES NFR BLD: 15.3 % (ref 4–15)
NEUTROPHILS # BLD AUTO: 2.2 K/UL (ref 1.8–7.7)
NEUTROPHILS NFR BLD: 50.5 % (ref 38–73)
NRBC BLD-RTO: 1 /100 WBC
OHS QRS DURATION: 72 MS
OHS QTC CALCULATION: 346 MS
PCO2 BLDA: 40.4 MMHG (ref 35–45)
PEEP: 5
PH SMN: 7.4 [PH] (ref 7.35–7.45)
PHOSPHATE SERPL-MCNC: 3.8 MG/DL (ref 2.7–4.5)
PLATELET # BLD AUTO: 111 K/UL (ref 150–450)
PMV BLD AUTO: 12 FL (ref 9.2–12.9)
PO2 BLDA: 126 MMHG (ref 80–100)
POC BE: 0 MMOL/L
POC SATURATED O2: 99 % (ref 95–100)
POC TCO2: 26 MMOL/L (ref 23–27)
POCT GLUCOSE: 104 MG/DL (ref 70–110)
POCT GLUCOSE: 109 MG/DL (ref 70–110)
POCT GLUCOSE: 88 MG/DL (ref 70–110)
POCT GLUCOSE: 89 MG/DL (ref 70–110)
POCT GLUCOSE: 91 MG/DL (ref 70–110)
POCT GLUCOSE: 92 MG/DL (ref 70–110)
POTASSIUM SERPL-SCNC: 4.4 MMOL/L (ref 3.5–5.1)
POTASSIUM SERPL-SCNC: 4.4 MMOL/L (ref 3.5–5.1)
PROT SERPL-MCNC: 6.3 G/DL (ref 6–8.4)
PROT SERPL-MCNC: 6.3 G/DL (ref 6–8.4)
RBC # BLD AUTO: 2.96 M/UL (ref 4.6–6.2)
SAMPLE: ABNORMAL
SITE: ABNORMAL
SODIUM SERPL-SCNC: 134 MMOL/L (ref 136–145)
SODIUM SERPL-SCNC: 134 MMOL/L (ref 136–145)
SP02: 100
VT: 480
WBC # BLD AUTO: 4.31 K/UL (ref 3.9–12.7)

## 2024-03-07 PROCEDURE — 83735 ASSAY OF MAGNESIUM: CPT | Mod: 91

## 2024-03-07 PROCEDURE — C9113 INJ PANTOPRAZOLE SODIUM, VIA: HCPCS

## 2024-03-07 PROCEDURE — 63600175 PHARM REV CODE 636 W HCPCS

## 2024-03-07 PROCEDURE — 25000003 PHARM REV CODE 250

## 2024-03-07 PROCEDURE — 93005 ELECTROCARDIOGRAM TRACING: CPT

## 2024-03-07 PROCEDURE — 93010 ELECTROCARDIOGRAM REPORT: CPT | Mod: ,,, | Performed by: INTERNAL MEDICINE

## 2024-03-07 PROCEDURE — 84100 ASSAY OF PHOSPHORUS: CPT

## 2024-03-07 PROCEDURE — 27100171 HC OXYGEN HIGH FLOW UP TO 24 HOURS

## 2024-03-07 PROCEDURE — 25000003 PHARM REV CODE 250: Performed by: INTERNAL MEDICINE

## 2024-03-07 PROCEDURE — 27200966 HC CLOSED SUCTION SYSTEM

## 2024-03-07 PROCEDURE — 20000000 HC ICU ROOM

## 2024-03-07 PROCEDURE — 80053 COMPREHEN METABOLIC PANEL: CPT | Mod: 91

## 2024-03-07 PROCEDURE — 94003 VENT MGMT INPAT SUBQ DAY: CPT

## 2024-03-07 PROCEDURE — 37799 UNLISTED PX VASCULAR SURGERY: CPT

## 2024-03-07 PROCEDURE — 99233 SBSQ HOSP IP/OBS HIGH 50: CPT | Mod: GC,,, | Performed by: INTERNAL MEDICINE

## 2024-03-07 PROCEDURE — 83735 ASSAY OF MAGNESIUM: CPT

## 2024-03-07 PROCEDURE — 94761 N-INVAS EAR/PLS OXIMETRY MLT: CPT | Mod: XB

## 2024-03-07 PROCEDURE — 82803 BLOOD GASES ANY COMBINATION: CPT

## 2024-03-07 PROCEDURE — 99900035 HC TECH TIME PER 15 MIN (STAT)

## 2024-03-07 PROCEDURE — 85025 COMPLETE CBC W/AUTO DIFF WBC: CPT

## 2024-03-07 PROCEDURE — 80053 COMPREHEN METABOLIC PANEL: CPT

## 2024-03-07 PROCEDURE — 99233 SBSQ HOSP IP/OBS HIGH 50: CPT | Mod: ,,, | Performed by: STUDENT IN AN ORGANIZED HEALTH CARE EDUCATION/TRAINING PROGRAM

## 2024-03-07 PROCEDURE — 99900026 HC AIRWAY MAINTENANCE (STAT)

## 2024-03-07 RX ORDER — AMOXICILLIN 250 MG
1 CAPSULE ORAL 2 TIMES DAILY
Status: DISCONTINUED | OUTPATIENT
Start: 2024-03-07 | End: 2024-03-11

## 2024-03-07 RX ORDER — POLYETHYLENE GLYCOL 3350 17 G/17G
17 POWDER, FOR SOLUTION ORAL 2 TIMES DAILY
Status: DISCONTINUED | OUTPATIENT
Start: 2024-03-07 | End: 2024-03-11

## 2024-03-07 RX ORDER — MUPIROCIN 20 MG/G
OINTMENT TOPICAL 2 TIMES DAILY
Status: COMPLETED | OUTPATIENT
Start: 2024-03-07 | End: 2024-03-11

## 2024-03-07 RX ORDER — SODIUM CHLORIDE 9 MG/ML
INJECTION, SOLUTION INTRAVENOUS CONTINUOUS
Status: DISCONTINUED | OUTPATIENT
Start: 2024-03-07 | End: 2024-03-18

## 2024-03-07 RX ADMIN — ATORVASTATIN CALCIUM 40 MG: 40 TABLET, FILM COATED ORAL at 08:03

## 2024-03-07 RX ADMIN — SODIUM CHLORIDE: 9 INJECTION, SOLUTION INTRAVENOUS at 04:03

## 2024-03-07 RX ADMIN — PIPERACILLIN SODIUM AND TAZOBACTAM SODIUM 4.5 G: 4; .5 INJECTION, POWDER, FOR SOLUTION INTRAVENOUS at 11:03

## 2024-03-07 RX ADMIN — SENNOSIDES AND DOCUSATE SODIUM 1 TABLET: 8.6; 5 TABLET ORAL at 08:03

## 2024-03-07 RX ADMIN — HEPARIN SODIUM 5000 UNITS: 5000 INJECTION INTRAVENOUS; SUBCUTANEOUS at 09:03

## 2024-03-07 RX ADMIN — PIPERACILLIN SODIUM AND TAZOBACTAM SODIUM 4.5 G: 4; .5 INJECTION, POWDER, FOR SOLUTION INTRAVENOUS at 08:03

## 2024-03-07 RX ADMIN — MUPIROCIN: 20 OINTMENT TOPICAL at 11:03

## 2024-03-07 RX ADMIN — PIPERACILLIN SODIUM AND TAZOBACTAM SODIUM 4.5 G: 4; .5 INJECTION, POWDER, FOR SOLUTION INTRAVENOUS at 04:03

## 2024-03-07 RX ADMIN — HEPARIN SODIUM 5000 UNITS: 5000 INJECTION INTRAVENOUS; SUBCUTANEOUS at 05:03

## 2024-03-07 RX ADMIN — POLYETHYLENE GLYCOL 3350 17 G: 17 POWDER, FOR SOLUTION ORAL at 08:03

## 2024-03-07 RX ADMIN — MICONAZOLE NITRATE 2 % TOPICAL POWDER: at 08:03

## 2024-03-07 RX ADMIN — Medication 200 MCG/HR: at 10:03

## 2024-03-07 RX ADMIN — NOREPINEPHRINE BITARTRATE 0.08 MCG/KG/MIN: 4 INJECTION, SOLUTION INTRAVENOUS at 10:03

## 2024-03-07 RX ADMIN — NOREPINEPHRINE BITARTRATE 0.1 MCG/KG/MIN: 4 INJECTION, SOLUTION INTRAVENOUS at 05:03

## 2024-03-07 RX ADMIN — PANTOPRAZOLE SODIUM 40 MG: 40 INJECTION, POWDER, FOR SOLUTION INTRAVENOUS at 08:03

## 2024-03-07 RX ADMIN — HEPARIN SODIUM 5000 UNITS: 5000 INJECTION INTRAVENOUS; SUBCUTANEOUS at 01:03

## 2024-03-07 RX ADMIN — NOREPINEPHRINE BITARTRATE 0.1 MCG/KG/MIN: 4 INJECTION, SOLUTION INTRAVENOUS at 08:03

## 2024-03-07 RX ADMIN — MUPIROCIN: 20 OINTMENT TOPICAL at 08:03

## 2024-03-07 RX ADMIN — SILODOSIN 4 MG: 4 CAPSULE ORAL at 08:03

## 2024-03-07 RX ADMIN — NOREPINEPHRINE BITARTRATE 0.12 MCG/KG/MIN: 4 INJECTION, SOLUTION INTRAVENOUS at 03:03

## 2024-03-07 NOTE — PLAN OF CARE
POC reviewed with Bj Pate Jr. and family. Questions and concerns addressed. MD did round and tried SBT while off of Fent, pt did not tolerate well, Will attempt again tomorrow. Per MD to try and leave sedation off but is okay to restart at half the original dose before SBT was started if pt looks uncomfrtable. See below and flowsheets for full assessment and VS info.       Neuro:  Jaja Coma Scale  Best Eye Response: 2-->(E2) to pain  Best Motor Response: 3-->(M3) flexion to pain  Best Verbal Response: 1-->(V1) none  Jaja Coma Scale Score: 6  Assessment Qualifiers: patient chemically sedated or paralyzed, patient intubated  Pupil PERRLA: yes    24 hr Temp:  [95.5 °F (35.3 °C)-98.8 °F (37.1 °C)]     CV:  Rhythm: normal sinus rhythm   DVT prophylaxis: VTE Required Core Measure: Pharmacological prophylaxis initiated/maintained                            Pulses  Right Radial Pulse: 1+ (weak)  Left Radial Pulse: 1+ (weak)  Right Dorsalis Pedis Pulse: 1+ (weak)  Left Dorsalis Pedis Pulse: 2+ (normal)  Right Posterior Tibial Pulse: 1+ (weak)  Left Posterior Tibial Pulse: 1+ (weak)    Resp:     Vent Mode: A/C  Set Rate: 12 BPM  Oxygen Concentration (%): 30  Vt Set: 480 mL  PEEP/CPAP: 5 cmH20  Pressure Support: 5 cmH20    GI/:  GI prophylaxis: yes  Diet/Nutrition Received: NPO  Last Bowel Movement:  (unknown)  Voiding Characteristics: urethral catheter (bladder)       Urethral Catheter 03/04/24 0249 Silicone 16 Fr.-Reason for Continuing Urinary Catheterization: Critically ill in ICU and requiring hourly monitoring of intake/output   Intake/Output Summary (Last 24 hours) at 3/6/2024 1845  Last data filed at 3/6/2024 1805  Gross per 24 hour   Intake 3517.88 ml   Output 6175 ml   Net -2657.12 ml        Nutritional Supplement Intake: [facility]    Labs/Accuchecks:  Recent Labs   Lab 03/04/24  0402 03/04/24  0404 03/04/24  0406 03/05/24  0232 03/06/24  0542   WBC 3.44*  --   --  7.05 5.00   RBC 2.88*  --   --   "3.29* 3.03*   HGB 7.8*  --   --  9.0* 8.3*   HCT 25.0*   < > 25* 27.6* 24.8*   *  --   --  165 135*    < > = values in this interval not displayed.    No results for input(s): "PT", "INR", "APTT" in the last 168 hours.   Recent Labs     03/06/24  0542   *   K 4.0   CO2 23      BUN 18   CREATININE 1.6*   ALKPHOS 85   ALT 12   AST 15   BILITOT 0.3       Recent Labs   Lab 03/04/24  0402   CPK 53      Recent Labs     03/04/24  2211 03/05/24  0010 03/06/24  0517   PH 7.558* 7.487* 7.486*   PCO2 28.1* 32.1* 31.6*   PO2 52 134* 126*   HCO3 25.0 24.4 23.9*   POCSATURATED 91 99 99   BE 3* 1 0       Electrolytes: Electrolytes replaced  Accuchecks: Q2H    Gtts/LDAs:   fentanyl 25 mcg/hr (03/06/24 1805)    NORepinephrine bitartrate-D5W 0.1 mcg/kg/min (03/06/24 1819)       Lines/Drains/Airways       Central Venous Catheter Line  Duration             Introducer 03/06/24 0454 Internal Jugular Right <1 day              Drain  Duration                  NG/OG Tube 03/04/24 1258 Center mouth 2 days         Urethral Catheter 03/04/24 0249 Silicone 16 Fr. 2 days              Airway  Duration                  Airway - Non-Surgical 03/04/24 1255 Endotracheal Tube 2 days              Arterial Line  Duration             Arterial Line 03/06/24 0258 Left Radial <1 day              Peripheral Intravenous Line  Duration                  Peripheral IV - Single Lumen 20 G Anterior;Distal;Left Upper Arm -- days         Peripheral IV - Single Lumen 03/04/24 0215 20 G Anterior;Distal;Left Forearm 2 days         Peripheral IV - Single Lumen 03/04/24 18 G Right;Posterior Wrist 2 days                    Skin/Wounds  Bathing/Skin Care: bath, partial (03/06/24 1105)  Wounds: Yes  Wound care consulted: Yes  "

## 2024-03-07 NOTE — PLAN OF CARE
MICU DAILY GOALS     Family/Goals of care/Code Status   Code Status: Full Code    24H Vital Sign Range  Temp:  [97.3 °F (36.3 °C)-99 °F (37.2 °C)]   Pulse:  [50-85]   Resp:  [1-31]   BP: (109-134)/(48-57)   SpO2:  [98 %-100 %]   Arterial Line BP: (109-147)/(41-60)      Shift Events (include procedures and significant events)   No acute events this shift. CL dressing changed - see flowsheet. Bath complete. Family updated at bedside.     AWAKE RASS: Goal - RASS Goal: -1-->drowsy  Actual - RASS (Anderson Agitation-Sedation Scale): drowsy    Restraint necessity: Removing medical devices   BREATHE SBT: Fail    Coordinate A & B, analgesics/sedatives Pain: managed   SAT: Fail   Delirium CAM-ICU: Overall CAM-ICU: Positive   Early(intubated/ Progressive (non-intubated) Mobility MOVE Screen (INTUBATED ONLY): Fail    Activity: Activity Management: Rolling - L1   Feeding/Nutrition Diet order: Diet/Nutrition Received: tube feeding,     Thrombus DVT prophylaxis: VTE Required Core Measure: Pharmacological prophylaxis initiated/maintained   HOB Elevation Head of Bed (HOB) Positioning: HOB at 30-45 degrees   Ulcer Prophylaxis GI: yes   Glucose control managed Glycemic Management: blood glucose monitored   Skin Skin assessed during: Daily Assessment    Sacrum intact/not altered? No  Heels intact/not altered? Yes  Surgical wound? Yes    CHECK ONE!   (no altered skin or altered skin) and sub boxes:  [] No Altered Skin Integrity Present    []Prevention Measures Documented    [x] Altered Skin Integrity Present or Discovered   [x] LDA present in EPIC, daily doc completed              [] LDA added if not in EPIC (describe wound).                    When describing wound, do not stage, use descriptive words only.    [x] Wound Image Taken (required on admit,                   transfer/discharge and every Tuesday)    Wound Care Consulted? Yes    Attending Nurse: Ava Gimenez RN     Second RN/Staff Member: Tu Wilks RN   Bowel Function  constipation    Indwelling Catheter Necessity      Urethral Catheter 03/04/24 0249 Silicone 16 Fr.-Reason for Continuing Urinary Catheterization: Critically ill in ICU and requiring hourly monitoring of intake/output    Introducer 03/06/24 0454 Internal Jugular Right-Line Necessity Review: Hemodynamic instability     De-escalation Antibiotics Yes        VS and assessment per flow sheet, patient progressing towards goals as tolerated, plan of care reviewed with family, all concerns addressed, will continue to monitor.

## 2024-03-07 NOTE — PLAN OF CARE
[facility] - Cardiac Medical ICU  Discharge Reassessment    Primary Care Provider: Jose Antonio Foster MD    Expected Discharge Date: 3/11/2024    Patient stepped up to MICU 3/6/2024 at 2154 hours from CICU 3082 for severe hypotension and hypothermia. Patient not medically ready to discharge per MD. Patient intubated on ventilator.     sodium chloride 0.9% Stopped (03/07/24 0417)    fentanyl 200 mcg/hr (03/07/24 0600)    NORepinephrine bitartrate-D5W 0.1 mcg/kg/min (03/07/24 0600)     Discharge Plan A and Plan B have been determined by review of patient's clinical status, future medical and therapeutic needs, and coverage/benefits for post-acute care in coordination with multidisciplinary team members.      Reassessment (most recent)       Discharge Reassessment - 03/07/24 0938          Discharge Reassessment    Assessment Type Discharge Planning Reassessment     Did the patient's condition or plan change since previous assessment? Yes     Communicated GLORIA with patient/caregiver Date not available/Unable to determine     Discharge Plan A Skilled Nursing Facility   return to [facility]    Discharge Plan B Long-term acute care facility (LTAC)     DME Needed Upon Discharge  other (see comments)   TBD    Transition of Care Barriers None     Why the patient remains in the hospital Requires continued medical care        Post-Acute Status    Post-Acute Authorization Placement     Post-Acute Placement Status Pending medical clearance/testing   return to [facility]    Discharge Delays None known at this time                   Olesya Mata RN     [phone]

## 2024-03-07 NOTE — SUBJECTIVE & OBJECTIVE
Interval History/Significant Events: Telemetry w/o evidence of complete heart block for 48hrs, will not require TVP per EP. Remains on Zosyn and difficult to rouse.     Review of Systems   Unable to perform ROS: Intubated     Objective:     Vital Signs (Most Recent):  Temp: 98.6 °F (37 °C) (03/07/24 1600)  Pulse: 62 (03/07/24 1600)  Resp: 12 (03/07/24 1600)  BP: (!) 129/49 (03/07/24 1600)  SpO2: 100 % (03/07/24 1600) Vital Signs (24h Range):  Temp:  [97 °F (36.1 °C)-99 °F (37.2 °C)] 98.6 °F (37 °C)  Pulse:  [50-85] 62  Resp:  [1-31] 12  SpO2:  [98 %-100 %] 100 %  BP: (109-134)/(48-57) 129/49  Arterial Line BP: (109-147)/(41-60) 129/54   Weight: 114 kg (251 lb 5.2 oz)  Body mass index is 37.11 kg/m².      Intake/Output Summary (Last 24 hours) at 3/7/2024 1630  Last data filed at 3/7/2024 1546  Gross per 24 hour   Intake 2074.73 ml   Output 1850 ml   Net 224.73 ml            Physical Exam  Vitals and nursing note reviewed.   Constitutional:       Appearance: He is well-developed. He is ill-appearing.   HENT:      Head: Normocephalic.      Mouth/Throat:      Mouth: Mucous membranes are moist.   Eyes:      Pupils: Pupils are equal, round, and reactive to light.   Cardiovascular:      Rate and Rhythm: Normal rate. Rhythm irregular.   Pulmonary:      Effort: Pulmonary effort is normal.   Abdominal:      General: Abdomen is flat.      Palpations: Abdomen is soft.   Musculoskeletal:         General: No swelling.   Skin:     General: Skin is warm and dry.            Vents:  Vent Mode: A/C (03/07/24 1145)  Set Rate: 12 BPM (03/07/24 1145)  Vt Set: 480 mL (03/07/24 1145)  Pressure Support: 5 cmH20 (03/05/24 1431)  PEEP/CPAP: 5 cmH20 (03/07/24 1145)  Oxygen Concentration (%): 30 (03/07/24 1600)  Peak Airway Pressure: 21 cmH20 (03/07/24 1145)  Plateau Pressure: 8.2 cmH20 (03/07/24 1145)  Total Ve: 5.76 L/m (03/07/24 1145)  Negative Inspiratory Force (cm H2O): 0 (03/07/24 1522)  F/VT Ratio<105 (RSBI): (!) 27.03 (03/07/24  1145)  Lines/Drains/Airways       Central Venous Catheter Line  Duration             Introducer 03/06/24 0454 Internal Jugular Right 1 day              Drain  Duration                  NG/OG Tube 03/04/24 1258 Center mouth 3 days         Urethral Catheter 03/04/24 0249 Silicone 16 Fr. 3 days              Airway  Duration                  Airway - Non-Surgical 03/04/24 1255 Endotracheal Tube 3 days              Arterial Line  Duration             Arterial Line 03/06/24 0258 Left Radial 1 day              Peripheral Intravenous Line  Duration                  Peripheral IV - Single Lumen 20 G Anterior;Distal;Left Upper Arm -- days         Peripheral IV - Single Lumen 03/04/24 0215 20 G Anterior;Distal;Left Forearm 3 days         Peripheral IV - Single Lumen 03/04/24 18 G Right;Posterior Wrist 3 days                  Significant Labs:    CBC/Anemia Profile:  Recent Labs   Lab 03/06/24  0542 03/07/24  0422   WBC 5.00 4.31   HGB 8.3* 8.0*   HCT 24.8* 25.0*   * 111*   MCV 82 85   RDW 17.4* 17.2*          Chemistries:  Recent Labs   Lab 03/06/24 0542 03/07/24  0040 03/07/24  0422   * 134* 134*   K 4.0 4.4 4.4    104 104   CO2 23 24 24   BUN 18 17 17   CREATININE 1.6* 1.5* 1.5*   CALCIUM 8.5* 9.0 8.9   ALBUMIN 1.7* 1.7* 1.7*   PROT 6.2 6.3 6.3   BILITOT 0.3 0.3 0.3   ALKPHOS 85 95 97   ALT 12 12 10   AST 15 15 14   MG 1.8 2.1 2.0   PHOS 3.0  --  3.8         All pertinent labs within the past 24 hours have been reviewed.    Significant Imaging:  I have reviewed all pertinent imaging results/findings within the past 24 hours.

## 2024-03-07 NOTE — ASSESSMENT & PLAN NOTE
73-year-old male with past medical history of CVA with right-sided deficits, type 2 diabetes mellitus, hypertension who presented from skilled nursing facility on 3/4 for AMS, hypothermia and undifferentiated shock requiring pressors.During his hospitalization he has had numerous episodes of bradycardia down to 30 beats per minute.  EKG/tele tonight concerning for bradycardia  Episodes appear to be correlated with hypothermia.     Recommendations  Review of telemetry since 3/5/24 done. Has episodes of bradycardia to the 30s and periods of AV block with prolonging SC interval and occasional dropped QRS. These rhythms resemble Wenckebach, and there have not been evidence of a higher degree AV block in the past 48 hours since consult.   Continue to avoid AV ar blocking and bradycardic agents.  No need for TVP/PPM evident at this time. Can remove cordis if otherwise not needed. Please call EP if concern for high grade AV block arises.  Maintain telemetry, Mag greater than 2, K> 4

## 2024-03-07 NOTE — HOSPITAL COURSE
Patient intubated 3/4 for airway protection. Sedated and requiring Levophed. Urine with Citrobacter, abx narrowed accordingly. Developed AV block, EP consulted and cordis CVC placed. TVP deemed unnecessary given no evidence of 3rd degree heart block. Extubated to room air 3/10. Neurology consulted for evaluation of other causes of encephalopathy. Patient failed modified barium swallow study, NG tube placed on 3/12.

## 2024-03-07 NOTE — SUBJECTIVE & OBJECTIVE
Interval: Patient remains unresponsive. Review of telemetry shows no evidence of high grade block in past 48 hours. Episodes of wenckebach present, patient is hemodynamically stable.    Past Medical History:   Diagnosis Date    Acute on chronic systolic heart failure 12/13/2023    Anemia 06/04/2021    Anticoagulant long-term use     Basal ganglia hemorrhage     Left basal ganglia hemorrhage with resultant right-sided hemiparesis which has resolved.     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        Past Surgical History:   Procedure Laterality Date    CARPAL TUNNEL RELEASE Left 2/19/2020    Procedure: RELEASE, CARPAL TUNNEL LEFT;  Surgeon: Maria Luisa Mccurdy MD;  Location: [facility];  Service: Orthopedics;  Laterality: Left;    COLONOSCOPY N/A 7/28/2023    Procedure: COLONOSCOPY;  Surgeon: Jaylene Alvarado MD;  Location: [facility];  Service: Endoscopy;  Laterality: N/A;    EPIDURAL STEROID INJECTION N/A 5/2/2019    Procedure: Injection, Steroid, Epidural Cervical;  Surgeon: Dariel Doan Jr., MD;  Location: [facility];  Service: Pain Management;  Laterality: N/A;  Cervical Epidural Steroid Injection     [id number]    Arrive @ 1130; ASA; Check BG    EPIDURAL STEROID INJECTION Bilateral 5/29/2019    Procedure: Lumbar Medial Branch Blocks;  Surgeon: Dariel Doan Jr., MD;  Location: [facility];  Service: Pain Management;  Laterality: Bilateral;  Bilateral  Lumbar Medial Branch Blocks L3, L4, L5    [id number]  [id number]    Attive @ 1030 (11 arrival request); NO Sedation; ASA; Check BG    EPIDURAL STEROID INJECTION Bilateral 7/3/2019    Procedure: Lumbar Medial Branch Blocks;  Surgeon: Dariel Doan Jr., MD;  Location: [facility] ENDO;  Service: Pain Management;  Laterality: Bilateral;  Bilateral Lumbar Medial Branch Blocks L3, L4, L5    [id number]  [id number]    Arrive @ 0930; NO Sedation; ASA; Check BG    EPIDURAL STEROID INJECTION N/A 8/7/2019    Procedure: Injection, Steroid, Epidural Cervical;  Surgeon: Dariel Doan Jr., MD;  Location: [facility] ENDO;  Service: Pain Management;  Laterality: N/A;  Cervical Epidural Steroid Injection C7-T1    [id number]    Arrive @ 1115; Last ASA 7/30; Check BG    ESOPHAGOGASTRODUODENOSCOPY N/A 7/28/2023    Procedure: EGD (ESOPHAGOGASTRODUODENOSCOPY);  Surgeon: Jaylene Alvarado MD;  Location: [facility];  Service: Endoscopy;  Laterality: N/A;  inst via portal    FUSION, SPINE, LUMBAR, TLIF, POSTERIOR APPROACH, USING PEDICLE SCREW N/A 12/15/2023    Procedure: L-1 TO L-4 FUSION, SPINE, LUMBAR, TLIF, POSTERIOR APPROACH, USING PEDICLE SCREW;  Surgeon: Rolf Rincon MD;  Location: [facility] OR [address] [place] [address];  Service: Neurosurgery;  Laterality: N/A;    LEFT HEART CATHETERIZATION Left 9/21/2021    Procedure: Left heart cath 9am start, R rad access;  Surgeon: Dariel Conner MD;  Location: [facility] CATH LAB;  Service: Cardiology;  Laterality: Left;  RN PRE OP Covid NEGATIVE ON  9-20-21.  C A    MIDLINE CATHETER PLACEMENT  12/12/2023    NO PAST SURGERIES         Review of patient's allergies indicates:   Allergen Reactions    Tomato (solanum lycopersicum) Hives    Naproxen Hives    Shrimp Other (See Comments)       No current facility-administered medications on file prior to encounter.     Current Outpatient Medications on File Prior to Encounter   Medication Sig    aspirin (ECOTRIN) 81 MG EC tablet Take 81 mg by mouth once daily.    atorvastatin (LIPITOR) 40 MG tablet  Take 1 tablet (40 mg total) by mouth once daily.    blood sugar diagnostic Strp To check BG 2 times daily, to use with insurance preferred meter    colchicine (COLCRYS) 0.6 mg tablet Take 1 tablet (0.6 mg total) by mouth every Mon, Wed, Fri.    fluticasone-salmeterol diskus inhaler 500-50 mcg Inhale 1 puff into the lungs 2 (two) times daily. Controller    furosemide (LASIX) 80 MG tablet Take 1 tablet (80 mg total) by mouth once daily.    hydroCHLOROthiazide (HYDRODIURIL) 25 MG tablet Take 25 mg by mouth once daily.    Lactobacillus rhamnosus GG (CULTURELLE) 10 billion cell capsule 1 capsule by Per G Tube route once daily.    lancets Misc To check BG 2 times daily, to use with insurance preferred meter    losartan (COZAAR) 25 MG tablet Take 1 tablet (25 mg total) by mouth once daily.    nystatin (MYCOSTATIN) 100,000 unit/mL suspension Take 6 mLs (600,000 Units total) by mouth 4 (four) times daily. for 10 days    potassium bicarbonate (K-LYTE) disintegrating tablet 1 tablet (25 mEq total) by Per G Tube route once daily. Stop 1/4/2024. May take orally if NG tube not available    psyllium husk, aspartame, (METAMUCIL) 3.4 gram PwPk packet 1 packet by Per NG tube route 2 (two) times daily.    tamsulosin (FLOMAX) 0.4 mg Cap Take 0.4 mg by mouth once daily.     Family History       Problem Relation (Age of Onset)    Diabetes Paternal Grandfather    Heart disease Paternal Grandfather    Hypertension     No Known Problems Mother, Father, Sister, Brother, Maternal Aunt, Maternal Uncle, Paternal Aunt, Paternal Uncle, Maternal Grandmother, Maternal Grandfather, Paternal Grandmother          Tobacco Use    Smoking status: Never    Smokeless tobacco: Never   Substance and Sexual Activity    Alcohol use: Yes     Alcohol/week: 0.0 standard drinks of alcohol     Comment: occacionally    Drug use: No    Sexual activity: Not Currently     Review of Systems   Unable to perform ROS: Intubated     Objective:     Vital Signs (Most  Recent):  Temp: 98.8 °F (37.1 °C) (03/07/24 1400)  Pulse: 63 (03/07/24 1400)  Resp: 12 (03/07/24 1400)  BP: (!) 121/48 (03/07/24 1400)  SpO2: 98 % (03/07/24 1400) Vital Signs (24h Range):  Temp:  [96.4 °F (35.8 °C)-99 °F (37.2 °C)] 98.8 °F (37.1 °C)  Pulse:  [50-85] 63  Resp:  [1-31] 12  SpO2:  [98 %-100 %] 98 %  BP: (109-134)/(48-57) 121/48  Arterial Line BP: (109-148)/(41-61) 121/48       Weight: 114 kg (251 lb 5.2 oz)  Body mass index is 37.11 kg/m².    SpO2: 98 %        Physical Exam  Constitutional:       Appearance: He is ill-appearing.   HENT:      Head: Normocephalic and atraumatic.      Mouth/Throat:      Mouth: Mucous membranes are moist.   Eyes:      Conjunctiva/sclera: Conjunctivae normal.   Cardiovascular:      Rate and Rhythm: Normal rate and regular rhythm.   Pulmonary:      Comments: Mechanical breath sounds  Abdominal:      General: Abdomen is flat.   Skin:     Capillary Refill: Capillary refill takes less than 2 seconds.   Neurological:      Comments: Sedated            Significant Labs: All pertinent lab results from the last 24 hours have been reviewed.

## 2024-03-07 NOTE — ASSESSMENT & PLAN NOTE
"This patient does have evidence of infective focus  My overall impression is sepsis.  Source: Urinary Tract  Antibiotics given-   Antibiotics (72h ago, onward)      Start     Stop Route Frequency Ordered    03/07/24 1230  mupirocin 2 % ointment         03/12/24 0859 Nasl 2 times daily 03/07/24 1120    03/04/24 0430  piperacillin-tazobactam (ZOSYN) 4.5 g in dextrose 5 % in water (D5W) 100 mL IVPB (MB+)         03/10/24 2359 IV Every 8 hours (non-standard times) 03/04/24 0320          Latest lactate reviewed-  No results for input(s): "LACTATE", "POCLAC" in the last 72 hours.    Organ dysfunction indicated by Encephalopathy    Fluid challenge Ideal Body Weight- The patient's ideal body weight is Ideal body weight: 70.7 kg (155 lb 13.8 oz) which will be used to calculate fluid bolus of 30 ml/kg for treatment of septic shock.      Post- resuscitation assessment No - Post resuscitation assessment not needed       Will Not start Pressors- Levophed for MAP of 65  Source control achieved by: IV abx    74M with hx CVA, osteomyelitis s/p neurosurgical intervention [place] [facility] [place] 2 days. Found to be hypothermic and hypoglycemic with intermittent readings of hypotension to MAP 55. Lactic acid 1. Suspect urinary source given cloudy urine draining from Rivera on presentation. S/p 1L warmed IVF, started on Vanc/Zosyn. Urine with citrobacter sensitive to Zosyn. Blood cx w/NG.    -continue Zosyn  -nikki jamshider for hypothermia  "

## 2024-03-07 NOTE — PROGRESS NOTES
[facility] - Cardiac Medical ICU  Critical Care Medicine  Progress Note    Patient Name: Bj Pate Jr.  MRN: [id number]  Admission Date: 3/4/2024  Hospital Length of Stay: 3 days  Code Status: Full Code  Attending Provider: Aiden Reyes*  Primary Care Provider: Jose Antonio Foster MD   Principal Problem: Hypothermia    Subjective:     HPI:  73M with PMH CVA with R sided deficits, DM, HTN who presents to the ED from SNF for AMS. Per chart, he was more somnolent than usual over the last 2 days. At baseline he is verbal and follows commands but does not ambulate. On arrival to ED he was hypothermic to 87.9F and found to be hypoglycemic to the 50s. Intermittently hypotensive to MAP 55. Patient is awake but does not interact meaningfully on exam. Chronic Rivera was initially draining cloudy urine with sediment, Rivera exchanged. Initial labs without leukocytosis, CMP unremarkable, VBG with pH 7.49/30.3/38.9, lactate normal. Received 1L NS bolus through warming pump. Initially admitted to , critical care consulted for severe hypothermia with hypotension.     Hospital/ICU Course:  Patient intubated 3/4 for airway protection. Sedated and requiring Levophed. Developed AV block, EP consulted and cordis CVC placed. TVP deemed unnecessary given no evidence of 3rd degree heart block.     Interval History/Significant Events: Telemetry w/o evidence of complete heart block for 48hrs, will not require TVP per EP. Remains on Zosyn and difficult to rouse.     Review of Systems   Unable to perform ROS: Intubated     Objective:     Vital Signs (Most Recent):  Temp: 98.6 °F (37 °C) (03/07/24 1600)  Pulse: 62 (03/07/24 1600)  Resp: 12 (03/07/24 1600)  BP: (!) 129/49 (03/07/24 1600)  SpO2: 100 % (03/07/24 1600) Vital Signs (24h Range):  Temp:  [97 °F (36.1 °C)-99 °F (37.2 °C)] 98.6 °F (37 °C)  Pulse:  [50-85] 62  Resp:  [1-31] 12  SpO2:  [98 %-100 %] 100 %  BP: (109-134)/(48-57) 129/49  Arterial Line BP: (109-147)/(41-60) 129/54    Weight: 114 kg (251 lb 5.2 oz)  Body mass index is 37.11 kg/m².      Intake/Output Summary (Last 24 hours) at 3/7/2024 1630  Last data filed at 3/7/2024 1546  Gross per 24 hour   Intake 2074.73 ml   Output 1850 ml   Net 224.73 ml            Physical Exam  Vitals and nursing note reviewed.   Constitutional:       Appearance: He is well-developed. He is ill-appearing.   HENT:      Head: Normocephalic.      Mouth/Throat:      Mouth: Mucous membranes are moist.   Eyes:      Pupils: Pupils are equal, round, and reactive to light.   Cardiovascular:      Rate and Rhythm: Normal rate. Rhythm irregular.   Pulmonary:      Effort: Pulmonary effort is normal.   Abdominal:      General: Abdomen is flat.      Palpations: Abdomen is soft.   Musculoskeletal:         General: No swelling.   Skin:     General: Skin is warm and dry.            Vents:  Vent Mode: A/C (03/07/24 1145)  Set Rate: 12 BPM (03/07/24 1145)  Vt Set: 480 mL (03/07/24 1145)  Pressure Support: 5 cmH20 (03/05/24 1431)  PEEP/CPAP: 5 cmH20 (03/07/24 1145)  Oxygen Concentration (%): 30 (03/07/24 1600)  Peak Airway Pressure: 21 cmH20 (03/07/24 1145)  Plateau Pressure: 8.2 cmH20 (03/07/24 1145)  Total Ve: 5.76 L/m (03/07/24 1145)  Negative Inspiratory Force (cm H2O): 0 (03/07/24 1522)  F/VT Ratio<105 (RSBI): (!) 27.03 (03/07/24 1145)  Lines/Drains/Airways       Central Venous Catheter Line  Duration             Introducer 03/06/24 0454 Internal Jugular Right 1 day              Drain  Duration                  NG/OG Tube 03/04/24 1258 Center mouth 3 days         Urethral Catheter 03/04/24 0249 Silicone 16 Fr. 3 days              Airway  Duration                  Airway - Non-Surgical 03/04/24 1255 Endotracheal Tube 3 days              Arterial Line  Duration             Arterial Line 03/06/24 0258 Left Radial 1 day              Peripheral Intravenous Line  Duration                  Peripheral IV - Single Lumen 20 G Anterior;Distal;Left Upper Arm -- days          Peripheral IV - Single Lumen 03/04/24 0215 20 G Anterior;Distal;Left Forearm 3 days         Peripheral IV - Single Lumen 03/04/24 18 G Right;Posterior Wrist 3 days                  Significant Labs:    CBC/Anemia Profile:  Recent Labs   Lab 03/06/24  0542 03/07/24  0422   WBC 5.00 4.31   HGB 8.3* 8.0*   HCT 24.8* 25.0*   * 111*   MCV 82 85   RDW 17.4* 17.2*          Chemistries:  Recent Labs   Lab 03/06/24  0542 03/07/24  0040 03/07/24  0422   * 134* 134*   K 4.0 4.4 4.4    104 104   CO2 23 24 24   BUN 18 17 17   CREATININE 1.6* 1.5* 1.5*   CALCIUM 8.5* 9.0 8.9   ALBUMIN 1.7* 1.7* 1.7*   PROT 6.2 6.3 6.3   BILITOT 0.3 0.3 0.3   ALKPHOS 85 95 97   ALT 12 12 10   AST 15 15 14   MG 1.8 2.1 2.0   PHOS 3.0  --  3.8         All pertinent labs within the past 24 hours have been reviewed.    Significant Imaging:  I have reviewed all pertinent imaging results/findings within the past 24 hours.    ABG  Recent Labs   Lab 03/07/24  0507   PH 7.403   PO2 126*   PCO2 40.4   HCO3 25.2   BE 0     Assessment/Plan:     Neuro  History of CVA (cerebrovascular accident)  Verbal and follows command at baseline, was not doing upon admission and for 2 days prior. Has right-sided deficits.       Patient intubated for airway protection and being unable to follow commands   MRI unrevealing   EEG monitoring ordered     Cardiac/Vascular  Complete heart block  Concern for complete heart block overnight. EP consulted. Cordis and A line in place.     Plan:  - A line, cordis in place     ID  Sepsis  This patient does have evidence of infective focus  My overall impression is sepsis.  Source: Urinary Tract  Antibiotics given-   Antibiotics (72h ago, onward)      Start     Stop Route Frequency Ordered    03/07/24 1230  mupirocin 2 % ointment         03/12/24 0859 Nasl 2 times daily 03/07/24 1120    03/04/24 0430  piperacillin-tazobactam (ZOSYN) 4.5 g in dextrose 5 % in water (D5W) 100 mL IVPB (MB+)         03/10/24 9266 IV Every 8  "hours (non-standard times) 03/04/24 0320          Latest lactate reviewed-  No results for input(s): "LACTATE", "POCLAC" in the last 72 hours.    Organ dysfunction indicated by Encephalopathy    Fluid challenge Ideal Body Weight- The patient's ideal body weight is Ideal body weight: 70.7 kg (155 lb 13.8 oz) which will be used to calculate fluid bolus of 30 ml/kg for treatment of septic shock.      Post- resuscitation assessment No - Post resuscitation assessment not needed       Will Not start Pressors- Levophed for MAP of 65  Source control achieved by: IV abx    74M with hx CVA, osteomyelitis s/p neurosurgical intervention presents to [facility] from SNF for AMS x 2 days. Found to be hypothermic and hypoglycemic with intermittent readings of hypotension to MAP 55. Lactic acid 1. Suspect urinary source given cloudy urine draining from Rivera on presentation. S/p 1L warmed IVF, started on Vanc/Zosyn. Urine with citrobacter sensitive to Zosyn. Blood cx w/NG.    -continue Zosyn  -nikki hugger for hypothermia    Endocrine  Hypoglycemia  Suspect hypoglycemia may be due to infection. See 'sepsis'    Other  * Hypothermia  Pt's body temp was 87 degrees Farenheit on admission. Pt was shivering, hypoglycemia noted. S/p 1L warmed IVF, nikki hugger in place. Patient now normothermic, continuing to monitor.       Critical Care Daily Checklist:    A: Awake: RASS Goal/Actual Goal: RASS Goal: -1-->drowsy  Actual:     B: Spontaneous Breathing Trial Performed? Spon. Breathing Trial Initiated?: Initiated (03/05/24 1431)   C: SAT & SBT Coordinated?  Yes                       D: Delirium: CAM-ICU Overall CAM-ICU: Positive   E: Early Mobility Performed? No   F: Feeding Goal: Goals: Meet % of EEN/EPN by RD f/u date  Status: Nutrition Goal Status: goal not met   Current Diet Order   Procedures    Diet NPO      AS: Analgesia/Sedation Fentanyl gtt   T: Thromboembolic Prophylaxis Heparin    H: HOB > 300 Yes   U: Stress Ulcer Prophylaxis (if " needed) Yes    G: Glucose Control No    B: Bowel Function     I: Indwelling Catheter (Lines & Rivera) Necessity Yes    D: De-escalation of Antimicrobials/Pharmacotherapies In process     Plan for the day/ETD SAT/SBT    Code Status:  Family/Goals of Care: Full Code         Critical secondary to Patient has a condition that poses threat to life and bodily function: septic shock      Critical care was time spent personally by me on the following activities: development of treatment plan with patient or surrogate and bedside caregivers, discussions with consultants, evaluation of patient's response to treatment, examination of patient, ordering and performing treatments and interventions, ordering and review of laboratory studies, ordering and review of radiographic studies, pulse oximetry, re-evaluation of patient's condition. This critical care time did not overlap with that of any other provider or involve time for any procedures.     KRISTOFER MIGUEL MD  Critical Care Medicine  [facility] - Cardiac Medical ICU

## 2024-03-07 NOTE — PROGRESS NOTES
[facility] - Cardiac Medical ICU  Cardiac Electrophysiology  Progress Note    Admission Date: 3/4/2024  Code Status: Full Code   Attending Physician: Aiden Reyes*   Expected Discharge Date: 3/11/2024  Principal Problem:Hypothermia    Subjective:     Interval: Patient remains unresponsive. Review of telemetry shows no evidence of high grade block in past 48 hours. Episodes of wenckebach present, patient is hemodynamically stable.    Past Medical History:   Diagnosis Date    Acute on chronic systolic heart failure 12/13/2023    Anemia 06/04/2021    Anticoagulant long-term use     Basal ganglia hemorrhage     Left basal ganglia hemorrhage with resultant right-sided hemiparesis which has resolved.     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        Past Surgical History:   Procedure Laterality Date    CARPAL TUNNEL RELEASE Left 2/19/2020    Procedure: RELEASE, CARPAL TUNNEL LEFT;  Surgeon: Maria Luisa Mccurdy MD;  Location: [facility] OR;  Service: Orthopedics;  Laterality: Left;    COLONOSCOPY N/A 7/28/2023    Procedure: COLONOSCOPY;  Surgeon: Jaylene Alvarado MD;  Location: [facility] ENDO;  Service: Endoscopy;  Laterality: N/A;    EPIDURAL STEROID INJECTION N/A 5/2/2019    Procedure: Injection, Steroid, Epidural Cervical;  Surgeon: Dariel Doan Jr., MD;  Location: [facility] ENDO;  Service: Pain Management;  Laterality: N/A;  Cervical Epidural Steroid Injection      [id number]    Arrive @ 1130; ASA; Check BG    EPIDURAL STEROID INJECTION Bilateral 5/29/2019    Procedure: Lumbar Medial Branch Blocks;  Surgeon: Dariel Doan Jr., MD;  Location: [facility] ENDO;  Service: Pain Management;  Laterality: Bilateral;  Bilateral Lumbar Medial Branch Blocks L3, L4, L5    [id number]  [id number]    Attive @ 1030 (11 arrival request); NO Sedation; ASA; Check BG    EPIDURAL STEROID INJECTION Bilateral 7/3/2019    Procedure: Lumbar Medial Branch Blocks;  Surgeon: Dariel Doan Jr., MD;  Location: [facility] ENDO;  Service: Pain Management;  Laterality: Bilateral;  Bilateral Lumbar Medial Branch Blocks L3, L4, L5    [id number]  [id number]    Arrive @ 0930; NO Sedation; ASA; Check BG    EPIDURAL STEROID INJECTION N/A 8/7/2019    Procedure: Injection, Steroid, Epidural Cervical;  Surgeon: Dariel Doan Jr., MD;  Location: [facility] ENDO;  Service: Pain Management;  Laterality: N/A;  Cervical Epidural Steroid Injection C7-T1    [id number]    Arrive @ 1115; Last ASA 7/30; Check BG    ESOPHAGOGASTRODUODENOSCOPY N/A 7/28/2023    Procedure: EGD (ESOPHAGOGASTRODUODENOSCOPY);  Surgeon: Jaylene Alvarado MD;  Location: [facility] ENDO;  Service: Endoscopy;  Laterality: N/A;  inst via portal    FUSION, SPINE, LUMBAR, TLIF, POSTERIOR APPROACH, USING PEDICLE SCREW N/A 12/15/2023    Procedure: L-1 TO L-4 FUSION, SPINE, LUMBAR, TLIF, POSTERIOR APPROACH, USING PEDICLE SCREW;  Surgeon: Rolf Rincon MD;  Location: [facility] OR [address] [place] [address];  Service: Neurosurgery;  Laterality: N/A;    LEFT HEART CATHETERIZATION Left 9/21/2021    Procedure: Left heart cath 9am start, R rad access;  Surgeon: Dariel Conner MD;  Location: [facility] CATH LAB;  Service: Cardiology;  Laterality: Left;  RN PRE OP Covid NEGATIVE ON  9-20-21.  C A    MIDLINE CATHETER PLACEMENT  12/12/2023    NO PAST SURGERIES         Review of patient's allergies indicates:   Allergen Reactions    Tomato (solanum lycopersicum) Hives    Naproxen Hives    Shrimp Other (See Comments)       No  current facility-administered medications on file prior to encounter.     Current Outpatient Medications on File Prior to Encounter   Medication Sig    aspirin (ECOTRIN) 81 MG EC tablet Take 81 mg by mouth once daily.    atorvastatin (LIPITOR) 40 MG tablet Take 1 tablet (40 mg total) by mouth once daily.    blood sugar diagnostic Strp To check BG 2 times daily, to use with insurance preferred meter    colchicine (COLCRYS) 0.6 mg tablet Take 1 tablet (0.6 mg total) by mouth every Mon, Wed, Fri.    fluticasone-salmeterol diskus inhaler 500-50 mcg Inhale 1 puff into the lungs 2 (two) times daily. Controller    furosemide (LASIX) 80 MG tablet Take 1 tablet (80 mg total) by mouth once daily.    hydroCHLOROthiazide (HYDRODIURIL) 25 MG tablet Take 25 mg by mouth once daily.    Lactobacillus rhamnosus GG (CULTURELLE) 10 billion cell capsule 1 capsule by Per G Tube route once daily.    lancets Misc To check BG 2 times daily, to use with insurance preferred meter    losartan (COZAAR) 25 MG tablet Take 1 tablet (25 mg total) by mouth once daily.    nystatin (MYCOSTATIN) 100,000 unit/mL suspension Take 6 mLs (600,000 Units total) by mouth 4 (four) times daily. for 10 days    potassium bicarbonate (K-LYTE) disintegrating tablet 1 tablet (25 mEq total) by Per G Tube route once daily. Stop 1/4/2024. May take orally if NG tube not available    psyllium husk, aspartame, (METAMUCIL) 3.4 gram PwPk packet 1 packet by Per NG tube route 2 (two) times daily.    tamsulosin (FLOMAX) 0.4 mg Cap Take 0.4 mg by mouth once daily.     Family History       Problem Relation (Age of Onset)    Diabetes Paternal Grandfather    Heart disease Paternal Grandfather    Hypertension     No Known Problems Mother, Father, Sister, Brother, Maternal Aunt, Maternal Uncle, Paternal Aunt, Paternal Uncle, Maternal Grandmother, Maternal Grandfather, Paternal Grandmother          Tobacco Use    Smoking status: Never    Smokeless tobacco: Never   Substance and Sexual  Activity    Alcohol use: Yes     Alcohol/week: 0.0 standard drinks of alcohol     Comment: occacionally    Drug use: No    Sexual activity: Not Currently     Review of Systems   Unable to perform ROS: Intubated     Objective:     Vital Signs (Most Recent):  Temp: 98.8 °F (37.1 °C) (03/07/24 1400)  Pulse: 63 (03/07/24 1400)  Resp: 12 (03/07/24 1400)  BP: (!) 121/48 (03/07/24 1400)  SpO2: 98 % (03/07/24 1400) Vital Signs (24h Range):  Temp:  [96.4 °F (35.8 °C)-99 °F (37.2 °C)] 98.8 °F (37.1 °C)  Pulse:  [50-85] 63  Resp:  [1-31] 12  SpO2:  [98 %-100 %] 98 %  BP: (109-134)/(48-57) 121/48  Arterial Line BP: (109-148)/(41-61) 121/48       Weight: 114 kg (251 lb 5.2 oz)  Body mass index is 37.11 kg/m².    SpO2: 98 %        Physical Exam  Constitutional:       Appearance: He is ill-appearing.   HENT:      Head: Normocephalic and atraumatic.      Mouth/Throat:      Mouth: Mucous membranes are moist.   Eyes:      Conjunctiva/sclera: Conjunctivae normal.   Cardiovascular:      Rate and Rhythm: Normal rate and regular rhythm.   Pulmonary:      Comments: Mechanical breath sounds  Abdominal:      General: Abdomen is flat.   Skin:     Capillary Refill: Capillary refill takes less than 2 seconds.   Neurological:      Comments: Sedated            Significant Labs: All pertinent lab results from the last 24 hours have been reviewed.      Assessment and Plan:     Complete heart block  73-year-old male with past medical history of CVA with right-sided deficits, type 2 diabetes mellitus, hypertension who presented from skilled nursing facility on 3/4 for AMS, hypothermia and undifferentiated shock requiring pressors.During his hospitalization he has had numerous episodes of bradycardia down to 30 beats per minute.  EKG/tele tonight concerning for bradycardia  Episodes appear to be correlated with hypothermia.     Recommendations  Review of telemetry since 3/5/24 done. Has episodes of bradycardia to the 30s and periods of AV block with  prolonging W[place] interval and occasional dropped QRS. These rhythms resemble Wenckebach, and there have not been evidence of a higher degree AV block in the past 48 hours since consult.   Continue to avoid AV ar blocking and bradycardic agents.  No need for TVP/PPM evident at this time. Can remove cordis if otherwise not needed. Please call EP if concern for high grade AV block arises.  Maintain telemetry, Mag greater than 2, K> 4    EP will sign off at this time.    John Castro MD  Cardiac Electrophysiology  [facility] - Cardiac Medical ICU

## 2024-03-07 NOTE — ASSESSMENT & PLAN NOTE
Pt's body temp was 87 degrees Farenheit on admission. Pt was shivering, hypoglycemia noted. S/p 1L warmed IVF, nikki hugger in place. Patient now normothermic, continuing to monitor.

## 2024-03-07 NOTE — PLAN OF CARE
MICU DAILY GOALS     Family/Goals of care/Code Status   Code Status: Full Code    24H Vital Sign Range  Temp:  [95.5 °F (35.3 °C)-99 °F (37.2 °C)]   Pulse:  [55-86]   Resp:  [3-41]   BP: (134)/(52)   SpO2:  [99 %-100 %]   Arterial Line BP: (101-148)/(41-61)      Shift Events (include procedures and significant events)   No acute events throughout shift, pt remains intubated and sedated on fent and levo gtts. Multiple episodes of bradycardia throughout night, team aware, new EKG obtained.    AWAKE RASS: Goal - RASS Goal: -1-->drowsy  Actual - RASS (Anderson Agitation-Sedation Scale): drowsy    Restraint necessity: Not necessary   BREATHE SBT: Fail    Coordinate A & B, analgesics/sedatives Pain: managed   SAT: Fail   Delirium CAM-ICU: Overall CAM-ICU: Positive   Early(intubated/ Progressive (non-intubated) Mobility MOVE Screen (INTUBATED ONLY): Fail    Activity: Activity Management: Rolling - L1   Feeding/Nutrition Diet order: Diet/Nutrition Received: tube feeding,     Thrombus DVT prophylaxis: VTE Required Core Measure: Pharmacological prophylaxis initiated/maintained   HOB Elevation Head of Bed (HOB) Positioning: HOB at 30 degrees   Ulcer Prophylaxis GI: yes   Glucose control managed Glycemic Management: blood glucose monitored   Skin Skin assessed during: Q Shift Change    Sacrum intact/not altered? No  Heels intact/not altered? Yes  Surgical wound? Yes    CHECK ONE!   (no altered skin or altered skin) and sub boxes:  [] No Altered Skin Integrity Present    []Prevention Measures Documented    [x] Altered Skin Integrity Present or Discovered   [x] LDA present in EPIC, daily doc completed              [] LDA added if not in EPIC (describe wound).                    When describing wound, do not stage, use descriptive words only.    [] Wound Image Taken (required on admit,                   transfer/discharge and every Tuesday)    Wound Care Consulted? No    Attending Nurse:     Second RN/Staff Member:    Bowel  Function constipation    Indwelling Catheter Necessity      Urethral Catheter 03/04/24 0249 Silicone 16 Fr.-Reason for Continuing Urinary Catheterization: Critically ill in ICU and requiring hourly monitoring of intake/output    Introducer 03/06/24 0454 Internal Jugular Right-Line Necessity Review: Hemodynamic instability     De-escalation Antibiotics Yes        VS and assessment per flow sheet, patient progressing towards goals as tolerated, plan of care reviewed with  Bj Pate Jr. And family , all concerns addressed, will continue to monitor.

## 2024-03-08 PROBLEM — G93.40 ACUTE ENCEPHALOPATHY: Status: ACTIVE | Noted: 2024-03-08

## 2024-03-08 LAB
ALBUMIN SERPL BCP-MCNC: 1.7 G/DL (ref 3.5–5.2)
ALLENS TEST: ABNORMAL
ALP SERPL-CCNC: 93 U/L (ref 55–135)
ALT SERPL W/O P-5'-P-CCNC: 10 U/L (ref 10–44)
ANION GAP SERPL CALC-SCNC: 7 MMOL/L (ref 8–16)
AST SERPL-CCNC: 12 U/L (ref 10–40)
BASOPHILS # BLD AUTO: 0.03 K/UL (ref 0–0.2)
BASOPHILS NFR BLD: 0.6 % (ref 0–1.9)
BILIRUB SERPL-MCNC: 0.4 MG/DL (ref 0.1–1)
BUN SERPL-MCNC: 20 MG/DL (ref 8–23)
CALCIUM SERPL-MCNC: 9.1 MG/DL (ref 8.7–10.5)
CHLORIDE SERPL-SCNC: 101 MMOL/L (ref 95–110)
CO2 SERPL-SCNC: 24 MMOL/L (ref 23–29)
CREAT SERPL-MCNC: 1.5 MG/DL (ref 0.5–1.4)
CREAT UR-MCNC: 37 MG/DL (ref 23–375)
DELSYS: ABNORMAL
DIFFERENTIAL METHOD BLD: ABNORMAL
EOSINOPHIL # BLD AUTO: 0.2 K/UL (ref 0–0.5)
EOSINOPHIL NFR BLD: 4.6 % (ref 0–8)
ERYTHROCYTE [DISTWIDTH] IN BLOOD BY AUTOMATED COUNT: 17.2 % (ref 11.5–14.5)
ERYTHROCYTE [SEDIMENTATION RATE] IN BLOOD BY WESTERGREN METHOD: 12 MM/H
EST. GFR  (NO RACE VARIABLE): 49.2 ML/MIN/1.73 M^2
FIO2: 30
GLUCOSE SERPL-MCNC: 109 MG/DL (ref 70–110)
HCO3 UR-SCNC: 26.2 MMOL/L (ref 24–28)
HCT VFR BLD AUTO: 23.9 % (ref 40–54)
HGB BLD-MCNC: 7.7 G/DL (ref 14–18)
IMM GRANULOCYTES # BLD AUTO: 0.03 K/UL (ref 0–0.04)
IMM GRANULOCYTES NFR BLD AUTO: 0.6 % (ref 0–0.5)
LYMPHOCYTES # BLD AUTO: 1.1 K/UL (ref 1–4.8)
LYMPHOCYTES NFR BLD: 23.6 % (ref 18–48)
MAGNESIUM SERPL-MCNC: 1.8 MG/DL (ref 1.6–2.6)
MCH RBC QN AUTO: 26.9 PG (ref 27–31)
MCHC RBC AUTO-ENTMCNC: 32.2 G/DL (ref 32–36)
MCV RBC AUTO: 84 FL (ref 82–98)
MIN VOL: 6
MODE: ABNORMAL
MONOCYTES # BLD AUTO: 0.7 K/UL (ref 0.3–1)
MONOCYTES NFR BLD: 15.2 % (ref 4–15)
NEUTROPHILS # BLD AUTO: 2.6 K/UL (ref 1.8–7.7)
NEUTROPHILS NFR BLD: 55.4 % (ref 38–73)
NRBC BLD-RTO: 0 /100 WBC
PCO2 BLDA: 41.6 MMHG (ref 35–45)
PEEP: 5
PH SMN: 7.41 [PH] (ref 7.35–7.45)
PHOSPHATE SERPL-MCNC: 3.4 MG/DL (ref 2.7–4.5)
PIP: 21
PLATELET # BLD AUTO: 98 K/UL (ref 150–450)
PMV BLD AUTO: 11.6 FL (ref 9.2–12.9)
PO2 BLDA: 124 MMHG (ref 80–100)
POC BE: 2 MMOL/L
POC SATURATED O2: 99 % (ref 95–100)
POC TCO2: 27 MMOL/L (ref 23–27)
POCT GLUCOSE: 101 MG/DL (ref 70–110)
POCT GLUCOSE: 112 MG/DL (ref 70–110)
POCT GLUCOSE: 114 MG/DL (ref 70–110)
POCT GLUCOSE: 121 MG/DL (ref 70–110)
POCT GLUCOSE: 127 MG/DL (ref 70–110)
POCT GLUCOSE: 93 MG/DL (ref 70–110)
POTASSIUM SERPL-SCNC: 4.2 MMOL/L (ref 3.5–5.1)
PROT SERPL-MCNC: 6.4 G/DL (ref 6–8.4)
PROT UR-MCNC: 12 MG/DL (ref 0–15)
PROT/CREAT UR: 0.32 MG/G{CREAT} (ref 0–0.2)
RBC # BLD AUTO: 2.86 M/UL (ref 4.6–6.2)
SAMPLE: ABNORMAL
SITE: ABNORMAL
SODIUM SERPL-SCNC: 132 MMOL/L (ref 136–145)
SP02: 100
VT: 480
WBC # BLD AUTO: 4.75 K/UL (ref 3.9–12.7)

## 2024-03-08 PROCEDURE — 99900035 HC TECH TIME PER 15 MIN (STAT)

## 2024-03-08 PROCEDURE — 25000003 PHARM REV CODE 250

## 2024-03-08 PROCEDURE — 83735 ASSAY OF MAGNESIUM: CPT

## 2024-03-08 PROCEDURE — 99233 SBSQ HOSP IP/OBS HIGH 50: CPT | Mod: GC,,, | Performed by: INTERNAL MEDICINE

## 2024-03-08 PROCEDURE — 99900026 HC AIRWAY MAINTENANCE (STAT)

## 2024-03-08 PROCEDURE — 20000000 HC ICU ROOM

## 2024-03-08 PROCEDURE — C9113 INJ PANTOPRAZOLE SODIUM, VIA: HCPCS

## 2024-03-08 PROCEDURE — 37799 UNLISTED PX VASCULAR SURGERY: CPT

## 2024-03-08 PROCEDURE — 80053 COMPREHEN METABOLIC PANEL: CPT

## 2024-03-08 PROCEDURE — 94761 N-INVAS EAR/PLS OXIMETRY MLT: CPT | Mod: XB

## 2024-03-08 PROCEDURE — 25500020 PHARM REV CODE 255: Performed by: INTERNAL MEDICINE

## 2024-03-08 PROCEDURE — 93005 ELECTROCARDIOGRAM TRACING: CPT

## 2024-03-08 PROCEDURE — 84100 ASSAY OF PHOSPHORUS: CPT

## 2024-03-08 PROCEDURE — 63600175 PHARM REV CODE 636 W HCPCS

## 2024-03-08 PROCEDURE — 93010 ELECTROCARDIOGRAM REPORT: CPT | Mod: ,,, | Performed by: INTERNAL MEDICINE

## 2024-03-08 PROCEDURE — 84156 ASSAY OF PROTEIN URINE: CPT

## 2024-03-08 PROCEDURE — 27200966 HC CLOSED SUCTION SYSTEM

## 2024-03-08 PROCEDURE — 85025 COMPLETE CBC W/AUTO DIFF WBC: CPT

## 2024-03-08 PROCEDURE — 82803 BLOOD GASES ANY COMBINATION: CPT

## 2024-03-08 PROCEDURE — 27100171 HC OXYGEN HIGH FLOW UP TO 24 HOURS

## 2024-03-08 PROCEDURE — 94003 VENT MGMT INPAT SUBQ DAY: CPT

## 2024-03-08 RX ORDER — MAGNESIUM SULFATE HEPTAHYDRATE 40 MG/ML
2 INJECTION, SOLUTION INTRAVENOUS ONCE
Status: COMPLETED | OUTPATIENT
Start: 2024-03-08 | End: 2024-03-08

## 2024-03-08 RX ORDER — LORAZEPAM 2 MG/ML
2 INJECTION INTRAMUSCULAR ONCE
Status: COMPLETED | OUTPATIENT
Start: 2024-03-08 | End: 2024-03-08

## 2024-03-08 RX ORDER — FENTANYL CITRATE-0.9 % NACL/PF 10 MCG/ML
0-250 PLASTIC BAG, INJECTION (ML) INTRAVENOUS CONTINUOUS
Status: DISCONTINUED | OUTPATIENT
Start: 2024-03-08 | End: 2024-03-10

## 2024-03-08 RX ADMIN — HEPARIN SODIUM 5000 UNITS: 5000 INJECTION INTRAVENOUS; SUBCUTANEOUS at 10:03

## 2024-03-08 RX ADMIN — SILODOSIN 4 MG: 4 CAPSULE ORAL at 08:03

## 2024-03-08 RX ADMIN — SENNOSIDES AND DOCUSATE SODIUM 1 TABLET: 8.6; 5 TABLET ORAL at 08:03

## 2024-03-08 RX ADMIN — NOREPINEPHRINE BITARTRATE 0.08 MCG/KG/MIN: 4 INJECTION, SOLUTION INTRAVENOUS at 09:03

## 2024-03-08 RX ADMIN — Medication 225 MCG/HR: at 11:03

## 2024-03-08 RX ADMIN — LORAZEPAM 2 MG: 2 INJECTION INTRAMUSCULAR; INTRAVENOUS at 09:03

## 2024-03-08 RX ADMIN — NOREPINEPHRINE BITARTRATE 0.1 MCG/KG/MIN: 4 INJECTION, SOLUTION INTRAVENOUS at 02:03

## 2024-03-08 RX ADMIN — HEPARIN SODIUM 5000 UNITS: 5000 INJECTION INTRAVENOUS; SUBCUTANEOUS at 02:03

## 2024-03-08 RX ADMIN — MUPIROCIN: 20 OINTMENT TOPICAL at 08:03

## 2024-03-08 RX ADMIN — SENNOSIDES AND DOCUSATE SODIUM 1 TABLET: 8.6; 5 TABLET ORAL at 10:03

## 2024-03-08 RX ADMIN — PANTOPRAZOLE SODIUM 40 MG: 40 INJECTION, POWDER, FOR SOLUTION INTRAVENOUS at 08:03

## 2024-03-08 RX ADMIN — NOREPINEPHRINE BITARTRATE 0.1 MCG/KG/MIN: 4 INJECTION, SOLUTION INTRAVENOUS at 08:03

## 2024-03-08 RX ADMIN — PIPERACILLIN SODIUM AND TAZOBACTAM SODIUM 4.5 G: 4; .5 INJECTION, POWDER, FOR SOLUTION INTRAVENOUS at 04:03

## 2024-03-08 RX ADMIN — MICONAZOLE NITRATE 2 % TOPICAL POWDER: at 10:03

## 2024-03-08 RX ADMIN — POLYETHYLENE GLYCOL 3350 17 G: 17 POWDER, FOR SOLUTION ORAL at 08:03

## 2024-03-08 RX ADMIN — ATORVASTATIN CALCIUM 40 MG: 40 TABLET, FILM COATED ORAL at 08:03

## 2024-03-08 RX ADMIN — POLYETHYLENE GLYCOL 3350 17 G: 17 POWDER, FOR SOLUTION ORAL at 10:03

## 2024-03-08 RX ADMIN — Medication 175 MCG/HR: at 12:03

## 2024-03-08 RX ADMIN — HEPARIN SODIUM 5000 UNITS: 5000 INJECTION INTRAVENOUS; SUBCUTANEOUS at 06:03

## 2024-03-08 RX ADMIN — MUPIROCIN: 20 OINTMENT TOPICAL at 10:03

## 2024-03-08 RX ADMIN — IOHEXOL 100 ML: 350 INJECTION, SOLUTION INTRAVENOUS at 09:03

## 2024-03-08 RX ADMIN — PIPERACILLIN SODIUM AND TAZOBACTAM SODIUM 4.5 G: 4; .5 INJECTION, POWDER, FOR SOLUTION INTRAVENOUS at 10:03

## 2024-03-08 RX ADMIN — MAGNESIUM SULFATE HEPTAHYDRATE 2 G: 40 INJECTION, SOLUTION INTRAVENOUS at 06:03

## 2024-03-08 RX ADMIN — MICONAZOLE NITRATE 2 % TOPICAL POWDER: at 08:03

## 2024-03-08 RX ADMIN — PIPERACILLIN SODIUM AND TAZOBACTAM SODIUM 4.5 G: 4; .5 INJECTION, POWDER, FOR SOLUTION INTRAVENOUS at 11:03

## 2024-03-08 NOTE — ASSESSMENT & PLAN NOTE
"This patient does have evidence of infective focus  My overall impression is sepsis.  Source: Urinary Tract  Antibiotics given-   Antibiotics (72h ago, onward)      Start     Stop Route Frequency Ordered    03/07/24 1230  mupirocin 2 % ointment         03/12/24 0859 Nasl 2 times daily 03/07/24 1120    03/04/24 0430  piperacillin-tazobactam (ZOSYN) 4.5 g in dextrose 5 % in water (D5W) 100 mL IVPB (MB+)         03/10/24 2359 IV Every 8 hours (non-standard times) 03/04/24 0320          Latest lactate reviewed-  No results for input(s): "LACTATE", "POCLAC" in the last 72 hours.    Organ dysfunction indicated by Encephalopathy    Fluid challenge Ideal Body Weight- The patient's ideal body weight is Ideal body weight: 70.7 kg (155 lb 13.8 oz) which will be used to calculate fluid bolus of 30 ml/kg for treatment of septic shock.      Post- resuscitation assessment No - Post resuscitation assessment not needed       Will Not start Pressors- Levophed for MAP of 65  Source control achieved by: IV abx    74M with hx CVA, osteomyelitis s/p neurosurgical intervention [place] [facility] [place]MS x 2 days. Found to be hypothermic and hypoglycemic with intermittent readings of hypotension to MAP 55. Lactic acid 1. Suspect urinary source given cloudy urine draining from Rivera on presentation. Patient encephalopathic and intubated for airway protection. S/p 1L warmed IVF, started on Vanc/Zosyn. Urine with citrobacter sensitive to Zosyn. Blood cx w/NG.    -continue Zosyn  -wean Levo as able  -nikki hugger for hypothermia  "

## 2024-03-08 NOTE — ASSESSMENT & PLAN NOTE
Concern for complete heart block overnight 3/6. EP consulted. Cordis and A line in place. Following >48hrs w/o evidence of complete heart block, EP advised that Cordis may be removed if desired as patient will not require pacing.    Plan:  - A line, cordis in place

## 2024-03-08 NOTE — SUBJECTIVE & OBJECTIVE
Interval History/Significant Events: Patient with agitation when fentanyl gtt decreased, difficulty with SAT as a result. No acute events.      Review of Systems   Unable to perform ROS: Intubated     Objective:     Vital Signs (Most Recent):  Temp: 99.1 °F (37.3 °C) (03/08/24 1405)  Pulse: 64 (03/08/24 1405)  Resp: 10 (03/08/24 1405)  BP: (!) 127/54 (03/07/24 1800)  SpO2: 100 % (03/08/24 1405) Vital Signs (24h Range):  Temp:  [97.9 °F (36.6 °C)-99.5 °F (37.5 °C)] 99.1 °F (37.3 °C)  Pulse:  [56-85] 64  Resp:  [5-32] 10  SpO2:  [98 %-100 %] 100 %  BP: (105-129)/(47-54) 127/54  Arterial Line BP: ()/(43-64) 114/49   Weight: 116 kg (255 lb 11.7 oz)  Body mass index is 37.77 kg/m².      Intake/Output Summary (Last 24 hours) at 3/8/2024 1507  Last data filed at 3/8/2024 1405  Gross per 24 hour   Intake 2504.5 ml   Output 2910 ml   Net -405.5 ml            Physical Exam  Vitals and nursing note reviewed.   Constitutional:       Appearance: He is well-developed. He is ill-appearing.   HENT:      Head: Normocephalic.      Mouth/Throat:      Mouth: Mucous membranes are moist.   Eyes:      Pupils: Pupils are equal, round, and reactive to light.   Cardiovascular:      Rate and Rhythm: Normal rate. Rhythm irregular.   Pulmonary:      Effort: Pulmonary effort is normal.   Abdominal:      General: Abdomen is flat.      Palpations: Abdomen is soft.   Musculoskeletal:         General: No swelling.   Skin:     General: Skin is warm and dry.            Vents:  Vent Mode: A/C (03/08/24 1122)  Set Rate: 12 BPM (03/08/24 1122)  Vt Set: 480 mL (03/08/24 1122)  Pressure Support: 5 cmH20 (03/05/24 1431)  PEEP/CPAP: 5 cmH20 (03/08/24 1122)  Oxygen Concentration (%): 30 (03/08/24 1505)  Peak Airway Pressure: 21 cmH20 (03/08/24 1122)  Plateau Pressure: 8.2 cmH20 (03/08/24 1122)  Total Ve: 9.52 L/m (03/08/24 1122)  Negative Inspiratory Force (cm H2O): 0 (03/08/24 1505)  F/VT Ratio<105 (RSBI): (!) 37.41 (03/08/24  1122)  Lines/Drains/Airways       Central Venous Catheter Line  Duration             Introducer 03/06/24 0454 Internal Jugular Right 2 days              Drain  Duration                  NG/OG Tube 03/04/24 1258 Center mouth 4 days         Urethral Catheter 03/04/24 0249 Silicone 16 Fr. 4 days              Airway  Duration                  Airway - Non-Surgical 03/04/24 1255 Endotracheal Tube 4 days              Arterial Line  Duration             Arterial Line 03/06/24 0258 Left Radial 2 days              Peripheral Intravenous Line  Duration                  Peripheral IV - Single Lumen 20 G Anterior;Distal;Left Upper Arm -- days         Peripheral IV - Single Lumen 03/04/24 0215 20 G Anterior;Distal;Left Forearm 4 days         Peripheral IV - Single Lumen 03/04/24 18 G Right;Posterior Wrist 4 days                  Significant Labs:    CBC/Anemia Profile:  Recent Labs   Lab 03/07/24  0422 03/08/24  0400   WBC 4.31 4.75   HGB 8.0* 7.7*   HCT 25.0* 23.9*   * 98*   MCV 85 84   RDW 17.2* 17.2*          Chemistries:  Recent Labs   Lab 03/07/24  0040 03/07/24  0422 03/08/24  0400   * 134* 132*   K 4.4 4.4 4.2    104 101   CO2 24 24 24   BUN 17 17 20   CREATININE 1.5* 1.5* 1.5*   CALCIUM 9.0 8.9 9.1   ALBUMIN 1.7* 1.7* 1.7*   PROT 6.3 6.3 6.4   BILITOT 0.3 0.3 0.4   ALKPHOS 95 97 93   ALT 12 10 10   AST 15 14 12   MG 2.1 2.0 1.8   PHOS  --  3.8 3.4         All pertinent labs within the past 24 hours have been reviewed.    Significant Imaging:  I have reviewed all pertinent imaging results/findings within the past 24 hours.

## 2024-03-08 NOTE — ASSESSMENT & PLAN NOTE
Patient brought from nursing facility d/t altered mentation, more lethargic than normal. Suspect encephalopathy is due to acute infection. Intubated for airway protection.

## 2024-03-08 NOTE — PROGRESS NOTES
[facility] - Cardiac Medical ICU  Critical Care Medicine  Progress Note    Patient Name: Bj Pate Jr.  MRN: [id number]  Admission Date: 3/4/2024  Hospital Length of Stay: 4 days  Code Status: Full Code  Attending Provider: Aiden Reyes*  Primary Care Provider: Jose Antonio Foster MD   Principal Problem: Hypothermia    Subjective:     HPI:  73M with PMH CVA with R sided deficits, DM, HTN who presents to the ED from SNF for AMS. Per chart, he was more somnolent than usual over the last 2 days. At baseline he is verbal and follows commands but does not ambulate. On arrival to ED he was hypothermic to 87.9F and found to be hypoglycemic to the 50s. Intermittently hypotensive to MAP 55. Patient is awake but does not interact meaningfully on exam. Chronic Rivera was initially draining cloudy urine with sediment, Rivera exchanged. Initial labs without leukocytosis, CMP unremarkable, VBG with pH 7.49/30.3/38.9, lactate normal. Received 1L NS bolus through warming pump. Initially admitted to , critical care consulted for severe hypothermia with hypotension.     Hospital/ICU Course:  Patient intubated 3/4 for airway protection. Sedated and requiring Levophed. Urine with Citrobacter, abx narrowed accordingly. Developed AV block, EP consulted and cordis CVC placed. TVP deemed unnecessary given no evidence of 3rd degree heart block. Difficulty weaning sedation due to agitation.    Interval History/Significant Events: Patient with agitation when fentanyl gtt decreased, difficulty with SAT as a result. No acute events.      Review of Systems   Unable to perform ROS: Intubated     Objective:     Vital Signs (Most Recent):  Temp: 99.1 °F (37.3 °C) (03/08/24 1405)  Pulse: 64 (03/08/24 1405)  Resp: 10 (03/08/24 1405)  BP: (!) 127/54 (03/07/24 1800)  SpO2: 100 % (03/08/24 1405) Vital Signs (24h Range):  Temp:  [97.9 °F (36.6 °C)-99.5 °F (37.5 °C)] 99.1 °F (37.3 °C)  Pulse:  [56-85] 64  Resp:  [5-32] 10  SpO2:  [98 %-100 %]  100 %  BP: (105-129)/(47-54) 127/54  Arterial Line BP: ()/(43-64) 114/49   Weight: 116 kg (255 lb 11.7 oz)  Body mass index is 37.77 kg/m².      Intake/Output Summary (Last 24 hours) at 3/8/2024 1507  Last data filed at 3/8/2024 1405  Gross per 24 hour   Intake 2504.5 ml   Output 2910 ml   Net -405.5 ml            Physical Exam  Vitals and nursing note reviewed.   Constitutional:       Appearance: He is well-developed. He is ill-appearing.   HENT:      Head: Normocephalic.      Mouth/Throat:      Mouth: Mucous membranes are moist.   Eyes:      Pupils: Pupils are equal, round, and reactive to light.   Cardiovascular:      Rate and Rhythm: Normal rate. Rhythm irregular.   Pulmonary:      Effort: Pulmonary effort is normal.   Abdominal:      General: Abdomen is flat.      Palpations: Abdomen is soft.   Musculoskeletal:         General: No swelling.   Skin:     General: Skin is warm and dry.            Vents:  Vent Mode: A/C (03/08/24 1122)  Set Rate: 12 BPM (03/08/24 1122)  Vt Set: 480 mL (03/08/24 1122)  Pressure Support: 5 cmH20 (03/05/24 1431)  PEEP/CPAP: 5 cmH20 (03/08/24 1122)  Oxygen Concentration (%): 30 (03/08/24 1505)  Peak Airway Pressure: 21 cmH20 (03/08/24 1122)  Plateau Pressure: 8.2 cmH20 (03/08/24 1122)  Total Ve: 9.52 L/m (03/08/24 1122)  Negative Inspiratory Force (cm H2O): 0 (03/08/24 1505)  F/VT Ratio<105 (RSBI): (!) 37.41 (03/08/24 1122)  Lines/Drains/Airways       Central Venous Catheter Line  Duration             Introducer 03/06/24 0454 Internal Jugular Right 2 days              Drain  Duration                  NG/OG Tube 03/04/24 1258 Center mouth 4 days         Urethral Catheter 03/04/24 0249 Silicone 16 Fr. 4 days              Airway  Duration                  Airway - Non-Surgical 03/04/24 1255 Endotracheal Tube 4 days              Arterial Line  Duration             Arterial Line 03/06/24 0258 Left Radial 2 days              Peripheral Intravenous Line  Duration                   Peripheral IV - Single Lumen 20 G Anterior;Distal;Left Upper Arm -- days         Peripheral IV - Single Lumen 03/04/24 0215 20 G Anterior;Distal;Left Forearm 4 days         Peripheral IV - Single Lumen 03/04/24 18 G Right;Posterior Wrist 4 days                  Significant Labs:    CBC/Anemia Profile:  Recent Labs   Lab 03/07/24 0422 03/08/24  0400   WBC 4.31 4.75   HGB 8.0* 7.7*   HCT 25.0* 23.9*   * 98*   MCV 85 84   RDW 17.2* 17.2*          Chemistries:  Recent Labs   Lab 03/07/24  0040 03/07/24  0422 03/08/24  0400   * 134* 132*   K 4.4 4.4 4.2    104 101   CO2 24 24 24   BUN 17 17 20   CREATININE 1.5* 1.5* 1.5*   CALCIUM 9.0 8.9 9.1   ALBUMIN 1.7* 1.7* 1.7*   PROT 6.3 6.3 6.4   BILITOT 0.3 0.3 0.4   ALKPHOS 95 97 93   ALT 12 10 10   AST 15 14 12   MG 2.1 2.0 1.8   PHOS  --  3.8 3.4         All pertinent labs within the past 24 hours have been reviewed.    Significant Imaging:  I have reviewed all pertinent imaging results/findings within the past 24 hours.    ABG  Recent Labs   Lab 03/08/24 0420   PH 7.408   PO2 124*   PCO2 41.6   HCO3 26.2   BE 2     Assessment/Plan:     Neuro  Acute encephalopathy  Patient brought from nursing facility d/t altered mentation, more lethargic than normal. Suspect encephalopathy is due to acute infection. Intubated for airway protection.     History of CVA (cerebrovascular accident)  Verbal and follows command at baseline, was not doing upon admission and for 2 days prior. Has right-sided deficits.       Patient intubated for airway protection and being unable to follow commands   MRI unrevealing   EEG monitoring ordered     Cardiac/Vascular  Complete heart block  Concern for complete heart block overnight 3/6. EP consulted. Cordis and A line in place. Following >48hrs w/o evidence of complete heart block, EP advised that Cordis may be removed if desired as patient will not require pacing.    Plan:  - A line, cordis in place     ID  Sepsis  This patient does  "have evidence of infective focus  My overall impression is sepsis.  Source: Urinary Tract  Antibiotics given-   Antibiotics (72h ago, onward)      Start     Stop Route Frequency Ordered    03/07/24 1230  mupirocin 2 % ointment         03/12/24 0859 Nasl 2 times daily 03/07/24 1120    03/04/24 0430  piperacillin-tazobactam (ZOSYN) 4.5 g in dextrose 5 % in water (D5W) 100 mL IVPB (MB+)         03/10/24 2359 IV Every 8 hours (non-standard times) 03/04/24 0320          Latest lactate reviewed-  No results for input(s): "LACTATE", "POCLAC" in the last 72 hours.    Organ dysfunction indicated by Encephalopathy    Fluid challenge Ideal Body Weight- The patient's ideal body weight is Ideal body weight: 70.7 kg (155 lb 13.8 oz) which will be used to calculate fluid bolus of 30 ml/kg for treatment of septic shock.      Post- resuscitation assessment No - Post resuscitation assessment not needed       Will Not start Pressors- Levophed for MAP of 65  Source control achieved by: IV abx    74M with hx CVA, osteomyelitis s/p neurosurgical intervention [place] [facility] [place] x 2 days. Found to be hypothermic and hypoglycemic with intermittent readings of hypotension to MAP 55. Lactic acid 1. Suspect urinary source given cloudy urine draining from Rivera on presentation. Patient encephalopathic and intubated for airway protection. S/p 1L warmed IVF, started on Vanc/Zosyn. Urine with citrobacter sensitive to Zosyn. Blood cx w/NG.    -continue Zosyn  -wean Levo as able  -nikki hugger for hypothermia    Endocrine  Hypoglycemia  Suspect hypoglycemia may be due to infection. See 'sepsis'    Other  * Hypothermia  Pt's body temp was 87 degrees Farenheit on admission. Pt was shivering, hypoglycemia noted. S/p 1L warmed IVF, nikki hugger in place. Patient now normothermic, continuing to monitor.       Critical Care Daily Checklist:    A: Awake: RASS Goal/Actual Goal: RASS Goal: 3-->very agitated  Actual:     B: Spontaneous Breathing " Trial Performed? Spon. Breathing Trial Initiated?: Initiated (03/05/24 0259)   C: SAT & SBT Coordinated?  Yes                       D: Delirium: CAM-ICU Overall CAM-ICU: Positive   E: Early Mobility Performed? No   F: Feeding Goal: Goals: Meet % of EEN/EPN by RD f/u date  Status: Nutrition Goal Status: goal not met   Current Diet Order   Procedures    Diet NPO      AS: Analgesia/Sedation Fent gtt   T: Thromboembolic Prophylaxis Yes    H: HOB > 300 Yes   U: Stress Ulcer Prophylaxis (if needed) Yes    G: Glucose Control Yes    B: Bowel Function     I: Indwelling Catheter (Lines & Rivera) Necessity Yes    D: De-escalation of Antimicrobials/Pharmacotherapies Not indicated    Plan for the day/ETD Attempt SAT/SBT    Code Status:  Family/Goals of Care: Full Code         Critical secondary to Patient has a condition that poses threat to life and bodily function: septic shock      Critical care was time spent personally by me on the following activities: development of treatment plan with patient or surrogate and bedside caregivers, discussions with consultants, evaluation of patient's response to treatment, examination of patient, ordering and performing treatments and interventions, ordering and review of laboratory studies, ordering and review of radiographic studies, pulse oximetry, re-evaluation of patient's condition. This critical care time did not overlap with that of any other provider or involve time for any procedures.     KRISTOFER MIGUEL MD  Critical Care Medicine  [facility] - Cardiac Medical ICU

## 2024-03-08 NOTE — PLAN OF CARE
MICU DAILY GOALS     Family/Goals of care/Code Status   Code Status: Full Code    24H Vital Sign Range  Temp:  [97.3 °F (36.3 °C)-99.3 °F (37.4 °C)]   Pulse:  [56-85]   Resp:  [1-32]   BP: (105-133)/(47-57)   SpO2:  [98 %-100 %]   Arterial Line BP: (105-154)/(45-64)      Shift Events (include procedures and significant events)   No acute events throughout shift, pt remains intubated and sedated on fent and levo gtts. Lytes replaced. PRN meds given.    AWAKE RASS: Goal - RASS Goal: -1-->drowsy  Actual - RASS (Anderson Agitation-Sedation Scale): drowsy    Restraint necessity: Not necessary   BREATHE SBT: Not attempted    Coordinate A & B, analgesics/sedatives Pain: managed   SAT: Not attempted   Delirium CAM-ICU: Overall CAM-ICU: Positive   Early(intubated/ Progressive (non-intubated) Mobility MOVE Screen (INTUBATED ONLY): Not attempted    Activity: Activity Management: Rolling - L1   Feeding/Nutrition Diet order: Diet/Nutrition Received: tube feeding,     Thrombus DVT prophylaxis: VTE Required Core Measure: Pharmacological prophylaxis initiated/maintained   HOB Elevation Head of Bed (HOB) Positioning: HOB at 30 degrees   Ulcer Prophylaxis GI: yes   Glucose control managed Glycemic Management: blood glucose monitored   Skin Skin assessed during: Q Shift Change    Sacrum intact/not altered? No  Heels intact/not altered? Yes  Surgical wound? No    CHECK ONE!   (no altered skin or altered skin) and sub boxes:  [] No Altered Skin Integrity Present    []Prevention Measures Documented    [x] Altered Skin Integrity Present or Discovered   [x] LDA present in EPIC, daily doc completed              [] LDA added if not in EPIC (describe wound).                    When describing wound, do not stage, use descriptive words only.    [] Wound Image Taken (required on admit,                   transfer/discharge and every Tuesday)    Wound Care Consulted? No    Attending Nurse:     Second RN/Staff Member:    Bowel Function  constipation    Indwelling Catheter Necessity      Urethral Catheter 03/04/24 0249 Silicone 16 Fr.-Reason for Continuing Urinary Catheterization: Critically ill in ICU and requiring hourly monitoring of intake/output    Introducer 03/06/24 0454 Internal Jugular Right-Line Necessity Review: Hemodynamic instability     De-escalation Antibiotics No        VS and assessment per flow sheet, patient progressing towards goals as tolerated, plan of care reviewed with  Bj Pate Jr. and family , all concerns addressed, will continue to monitor.

## 2024-03-08 NOTE — CODE/ RAPID DOCUMENTATION
RAPID RESPONSE NURSE NOTE        Admit Date: 3/4/2024  LOS: 4  Code Status: Full Code   Date of Consult: 2024  : 1950  Age: 74 y.o.  Weight:   Wt Readings from Last 1 Encounters:   24 116 kg (255 lb 11.7 oz)     Sex: male  Race: Black or    Bed: [address] [place] [address] A:   MRN: [id number]  Time Rapid Response Team page Received: 1151  Time Rapid Response Team at Bedside: 1154  Time Rapid Response Team left Bedside: 1225  Was the patient discharged from an ICU this admission? Yes   Was the patient discharged from a PACU within last 24 hours? No   Did the patient receive conscious sedation/general anesthesia in last 24 hours? No  Was the patient in the ED within the past 24 hours? No  Was the patient on NIPPV within the past 24 hours? No   Did this progress into an ARC or CPA: Yes  Attending Physician: Aiden Reyes*  Primary Service: Intensive Care       SITUATION    Notified by pager.  Reason for alert: Airway Compromise  Called to evaluate the patient for Respiratory    BACKGROUND     Why is the patient in the hospital?: Hypothermia    Patient has a past medical history of Acute on chronic systolic heart failure, Anemia, Anticoagulant long-term use, Basal ganglia hemorrhage, Benign hypertension with CKD (chronic kidney disease) stage III, Cataract, Chronic idiopathic gout of multiple sites, Chronic kidney disease, stage 3, COPD (chronic obstructive pulmonary disease), Erectile dysfunction, Gout, Hemorrhoids without complication, Hyperlipidemia, Morbid obesity, Obstructive sleep apnea on CPAP, Reactive airway disease without complication, Severe sepsis, Stroke, Thalamic infarct, acute (right), and 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    Last Vitals:  Temp: 99.3 °F (37.4 °C) ( 1505)  Pulse: 63 ( 1505)  Resp: 12 ( 1505)  BP: 127/54 ( 1800)  SpO2: 100 % ( 1505)  Arterial Line BP: 106/50 ( 1505)    24 Hours Vitals Range:  Temp:  [97.9 °F (36.6 °C)-99.5  °F (37.5 °C)]   Pulse:  [56-85]   Resp:  [5-32]   BP: (105-129)/(47-54)   SpO2:  [98 %-100 %]   Arterial Line BP: ()/(43-64)     Labs:  Recent Labs     03/06/24  0542 03/07/24  0422 03/08/24  0400   WBC 5.00 4.31 4.75   HGB 8.3* 8.0* 7.7*   HCT 24.8* 25.0* 23.9*   * 111* 98*       Recent Labs     03/06/24  0542 03/07/24  0040 03/07/24  0422 03/08/24  0400   * 134* 134* 132*   K 4.0 4.4 4.4 4.2    104 104 101   CO2 23 24 24 24   BUN 18 17 17 20   CREATININE 1.6* 1.5* 1.5* 1.5*   * 101 89 109   PHOS 3.0  --  3.8 3.4   MG 1.8 2.1 2.0 1.8        Recent Labs     03/06/24  0517 03/07/24  0507 03/08/24  0420   PH 7.486* 7.403 7.408   PCO2 31.6* 40.4 41.6   PO2 126* 126* 124*   HCO3 23.9* 25.2 26.2   POCSATURATED 99 99 99   BE 0 0 2        ASSESSMENT    Physical Exam  Vitals and nursing note reviewed.   Cardiovascular:      Rate and Rhythm: Tachycardia present.   Pulmonary:      Breath sounds: Stridor present.   Skin:     Comments: Generalized Lesions all over body, face, eyes and lips and tongue. See H&P for description.   Neurological:      Mental Status: He is alert.      Comments: Due to lesions on face, lips and tongue unable to assess mental status. Patient did answer simple questions with a head nod and followed simple commands.      Dr Ca at bedside upon arrival and ready to intubate patient due to compromised airway. Upon further assessment patient SpO2 100% on RA and patient following simple commands Dr. Ca decided to transfer to MICU prior to intubation due to MICU bed being available. Patient transported to MICU Via, [id number] and Dr. Ca to 6090.     INTERVENTIONS    The patient was seen for Respiratory problem. Staff concerns included stridor. The following interventions were performed: EtCO2 monitoring, continuous pulse ox monitoring continued, and continuous cardiac monitoring continued.    RECOMMENDATIONS    We recommend: -Transfer to  MICU    PROVIDER ESCALATION    Orders received and case discussed with Dr. Guthrie .    Primary team arrival time: Upon Arrival     Disposition: Tx in ICU bed 6090.    FOLLOW UP    charge Georgia MOROCHO  updated on plan of care. Instructed to call the Rapid Response Nurse, Heriberto Dalal RN at [phone] for additional questions or concerns.

## 2024-03-09 LAB
ALBUMIN SERPL BCP-MCNC: 1.5 G/DL (ref 3.5–5.2)
ALP SERPL-CCNC: 86 U/L (ref 55–135)
ALT SERPL W/O P-5'-P-CCNC: 8 U/L (ref 10–44)
ANION GAP SERPL CALC-SCNC: 8 MMOL/L (ref 8–16)
AST SERPL-CCNC: 12 U/L (ref 10–40)
BACTERIA BLD CULT: NORMAL
BACTERIA BLD CULT: NORMAL
BASOPHILS # BLD AUTO: 0.01 K/UL (ref 0–0.2)
BASOPHILS NFR BLD: 0.1 % (ref 0–1.9)
BILIRUB SERPL-MCNC: 0.5 MG/DL (ref 0.1–1)
BUN SERPL-MCNC: 17 MG/DL (ref 8–23)
CALCIUM SERPL-MCNC: 9.1 MG/DL (ref 8.7–10.5)
CHLORIDE SERPL-SCNC: 99 MMOL/L (ref 95–110)
CO2 SERPL-SCNC: 25 MMOL/L (ref 23–29)
CREAT SERPL-MCNC: 1.5 MG/DL (ref 0.5–1.4)
DIFFERENTIAL METHOD BLD: ABNORMAL
EOSINOPHIL # BLD AUTO: 0.1 K/UL (ref 0–0.5)
EOSINOPHIL NFR BLD: 1.4 % (ref 0–8)
ERYTHROCYTE [DISTWIDTH] IN BLOOD BY AUTOMATED COUNT: 16.9 % (ref 11.5–14.5)
EST. GFR  (NO RACE VARIABLE): 49.2 ML/MIN/1.73 M^2
GLUCOSE SERPL-MCNC: 100 MG/DL (ref 70–110)
HCT VFR BLD AUTO: 23.6 % (ref 40–54)
HGB BLD-MCNC: 7.5 G/DL (ref 14–18)
IMM GRANULOCYTES # BLD AUTO: 0.1 K/UL (ref 0–0.04)
IMM GRANULOCYTES NFR BLD AUTO: 1.3 % (ref 0–0.5)
LYMPHOCYTES # BLD AUTO: 0.8 K/UL (ref 1–4.8)
LYMPHOCYTES NFR BLD: 10 % (ref 18–48)
MAGNESIUM SERPL-MCNC: 1.7 MG/DL (ref 1.6–2.6)
MCH RBC QN AUTO: 26.9 PG (ref 27–31)
MCHC RBC AUTO-ENTMCNC: 31.8 G/DL (ref 32–36)
MCV RBC AUTO: 85 FL (ref 82–98)
MONOCYTES # BLD AUTO: 1.4 K/UL (ref 0.3–1)
MONOCYTES NFR BLD: 18.2 % (ref 4–15)
NEUTROPHILS # BLD AUTO: 5.3 K/UL (ref 1.8–7.7)
NEUTROPHILS NFR BLD: 69 % (ref 38–73)
NRBC BLD-RTO: 0 /100 WBC
OHS QRS DURATION: 76 MS
OHS QTC CALCULATION: 404 MS
PHOSPHATE SERPL-MCNC: 3.1 MG/DL (ref 2.7–4.5)
PLATELET # BLD AUTO: 91 K/UL (ref 150–450)
PMV BLD AUTO: 12.5 FL (ref 9.2–12.9)
POCT GLUCOSE: 102 MG/DL (ref 70–110)
POCT GLUCOSE: 102 MG/DL (ref 70–110)
POCT GLUCOSE: 106 MG/DL (ref 70–110)
POCT GLUCOSE: 109 MG/DL (ref 70–110)
POTASSIUM SERPL-SCNC: 4.1 MMOL/L (ref 3.5–5.1)
PROT SERPL-MCNC: 6.3 G/DL (ref 6–8.4)
RBC # BLD AUTO: 2.79 M/UL (ref 4.6–6.2)
SODIUM SERPL-SCNC: 132 MMOL/L (ref 136–145)
WBC # BLD AUTO: 7.63 K/UL (ref 3.9–12.7)

## 2024-03-09 PROCEDURE — 99233 SBSQ HOSP IP/OBS HIGH 50: CPT | Mod: GC,,, | Performed by: INTERNAL MEDICINE

## 2024-03-09 PROCEDURE — 27100171 HC OXYGEN HIGH FLOW UP TO 24 HOURS

## 2024-03-09 PROCEDURE — 25000003 PHARM REV CODE 250

## 2024-03-09 PROCEDURE — A9585 GADOBUTROL INJECTION: HCPCS | Performed by: INTERNAL MEDICINE

## 2024-03-09 PROCEDURE — 99900026 HC AIRWAY MAINTENANCE (STAT)

## 2024-03-09 PROCEDURE — 84100 ASSAY OF PHOSPHORUS: CPT

## 2024-03-09 PROCEDURE — 20000000 HC ICU ROOM

## 2024-03-09 PROCEDURE — 25500020 PHARM REV CODE 255: Performed by: INTERNAL MEDICINE

## 2024-03-09 PROCEDURE — 99900035 HC TECH TIME PER 15 MIN (STAT)

## 2024-03-09 PROCEDURE — 63600175 PHARM REV CODE 636 W HCPCS

## 2024-03-09 PROCEDURE — 83735 ASSAY OF MAGNESIUM: CPT

## 2024-03-09 PROCEDURE — C9113 INJ PANTOPRAZOLE SODIUM, VIA: HCPCS

## 2024-03-09 PROCEDURE — 80053 COMPREHEN METABOLIC PANEL: CPT

## 2024-03-09 PROCEDURE — 94003 VENT MGMT INPAT SUBQ DAY: CPT

## 2024-03-09 PROCEDURE — 85025 COMPLETE CBC W/AUTO DIFF WBC: CPT

## 2024-03-09 PROCEDURE — 94761 N-INVAS EAR/PLS OXIMETRY MLT: CPT

## 2024-03-09 RX ORDER — GADOBUTROL 604.72 MG/ML
10 INJECTION INTRAVENOUS
Status: COMPLETED | OUTPATIENT
Start: 2024-03-09 | End: 2024-03-09

## 2024-03-09 RX ORDER — LORAZEPAM 2 MG/ML
2 INJECTION INTRAMUSCULAR ONCE AS NEEDED
Status: COMPLETED | OUTPATIENT
Start: 2024-03-09 | End: 2024-03-09

## 2024-03-09 RX ORDER — MAGNESIUM SULFATE HEPTAHYDRATE 40 MG/ML
2 INJECTION, SOLUTION INTRAVENOUS ONCE
Status: COMPLETED | OUTPATIENT
Start: 2024-03-09 | End: 2024-03-09

## 2024-03-09 RX ADMIN — MICONAZOLE NITRATE 2 % TOPICAL POWDER: at 09:03

## 2024-03-09 RX ADMIN — PIPERACILLIN SODIUM AND TAZOBACTAM SODIUM 4.5 G: 4; .5 INJECTION, POWDER, FOR SOLUTION INTRAVENOUS at 05:03

## 2024-03-09 RX ADMIN — HEPARIN SODIUM 5000 UNITS: 5000 INJECTION INTRAVENOUS; SUBCUTANEOUS at 02:03

## 2024-03-09 RX ADMIN — SENNOSIDES AND DOCUSATE SODIUM 1 TABLET: 8.6; 5 TABLET ORAL at 08:03

## 2024-03-09 RX ADMIN — HEPARIN SODIUM 5000 UNITS: 5000 INJECTION INTRAVENOUS; SUBCUTANEOUS at 05:03

## 2024-03-09 RX ADMIN — MUPIROCIN: 20 OINTMENT TOPICAL at 09:03

## 2024-03-09 RX ADMIN — PIPERACILLIN SODIUM AND TAZOBACTAM SODIUM 4.5 G: 4; .5 INJECTION, POWDER, FOR SOLUTION INTRAVENOUS at 09:03

## 2024-03-09 RX ADMIN — MUPIROCIN: 20 OINTMENT TOPICAL at 08:03

## 2024-03-09 RX ADMIN — MAGNESIUM SULFATE HEPTAHYDRATE 2 G: 40 INJECTION, SOLUTION INTRAVENOUS at 09:03

## 2024-03-09 RX ADMIN — NOREPINEPHRINE BITARTRATE 0.08 MCG/KG/MIN: 4 INJECTION, SOLUTION INTRAVENOUS at 05:03

## 2024-03-09 RX ADMIN — POLYETHYLENE GLYCOL 3350 17 G: 17 POWDER, FOR SOLUTION ORAL at 08:03

## 2024-03-09 RX ADMIN — Medication 250 MCG/HR: at 06:03

## 2024-03-09 RX ADMIN — POLYETHYLENE GLYCOL 3350 17 G: 17 POWDER, FOR SOLUTION ORAL at 09:03

## 2024-03-09 RX ADMIN — NOREPINEPHRINE BITARTRATE 0.1 MCG/KG/MIN: 4 INJECTION, SOLUTION INTRAVENOUS at 11:03

## 2024-03-09 RX ADMIN — MICONAZOLE NITRATE 2 % TOPICAL POWDER: at 08:03

## 2024-03-09 RX ADMIN — Medication 225 MCG/HR: at 09:03

## 2024-03-09 RX ADMIN — SILODOSIN 4 MG: 4 CAPSULE ORAL at 09:03

## 2024-03-09 RX ADMIN — PANTOPRAZOLE SODIUM 40 MG: 40 INJECTION, POWDER, FOR SOLUTION INTRAVENOUS at 08:03

## 2024-03-09 RX ADMIN — HEPARIN SODIUM 5000 UNITS: 5000 INJECTION INTRAVENOUS; SUBCUTANEOUS at 09:03

## 2024-03-09 RX ADMIN — LORAZEPAM 2 MG: 2 INJECTION INTRAMUSCULAR; INTRAVENOUS at 12:03

## 2024-03-09 RX ADMIN — PIPERACILLIN SODIUM AND TAZOBACTAM SODIUM 4.5 G: 4; .5 INJECTION, POWDER, FOR SOLUTION INTRAVENOUS at 02:03

## 2024-03-09 RX ADMIN — ATORVASTATIN CALCIUM 40 MG: 40 TABLET, FILM COATED ORAL at 08:03

## 2024-03-09 RX ADMIN — GADOBUTROL 10 ML: 604.72 INJECTION INTRAVENOUS at 01:03

## 2024-03-09 RX ADMIN — SENNOSIDES AND DOCUSATE SODIUM 1 TABLET: 8.6; 5 TABLET ORAL at 09:03

## 2024-03-09 NOTE — PROGRESS NOTES
[facility] - Cardiac Medical [facility]  Critical Care Medicine  Progress Note    Patient Name: Bj Pate Jr.  MRN: [id number]  Admission Date: 3/4/2024  Hospital Length of Stay: 5 days  Code Status: Full Code  Attending Provider: Aiden Reyes*  Primary Care Provider: Jose Antonio Foster MD   Principal Problem: Hypothermia    Subjective:     HPI:  73M with PMH CVA with R sided deficits, DM, HTN who presents to the ED from SNF for AMS. Per chart, he was more somnolent than usual over the last 2 days. At baseline he is verbal and follows commands but does not ambulate. On arrival to ED he was hypothermic to 87.9F and found to be hypoglycemic to the 50s. Intermittently hypotensive to MAP 55. Patient is awake but does not interact meaningfully on exam. Chronic Rivera was initially draining cloudy urine with sediment, Rivera exchanged. Initial labs without leukocytosis, CMP unremarkable, VBG with pH 7.49/30.3/38.9, lactate normal. Received 1L NS bolus through warming pump. Initially admitted to , critical care consulted for severe hypothermia with hypotension.     Hospital/ICU Course:  Patient intubated 3/4 for airway protection. Sedated and requiring Levophed. Urine with Citrobacter, abx narrowed accordingly. Developed AV block, EP consulted and cordis CVC placed. TVP deemed unnecessary given no evidence of 3rd degree heart block. Difficulty weaning sedation due to agitation.    Interval History/Significant Events: No acute events. Patient undergoing imaging studies to further evaluate potential sources of infection.       Review of Systems   Unable to perform ROS: Intubated     Objective:     Vital Signs (Most Recent):  Temp:  (Pt in MRI) (03/09/24 1330)  Pulse: 73 (03/09/24 1330)  Resp: 16 (03/09/24 1105)  BP: (!) 144/83 (03/09/24 1330)  SpO2: 100 % (03/09/24 1330) Vital Signs (24h Range):  Temp:  [97.9 °F (36.6 °C)-99.5 °F (37.5 °C)] 98.8 °F (37.1 °C)  Pulse:  [] 73  Resp:  [5-29] 16  SpO2:  [89 %-100 %]  100 %  BP: (125-144)/(68-83) 144/83  Arterial Line BP: (106-163)/(44-59) 128/56   Weight: 113.9 kg (251 lb 1.7 oz)  Body mass index is 37.08 kg/m².      Intake/Output Summary (Last 24 hours) at 3/9/2024 1406  Last data filed at 3/9/2024 1305  Gross per 24 hour   Intake 1972.1 ml   Output 1110 ml   Net 862.1 ml            Physical Exam  Vitals and nursing note reviewed.   Constitutional:       Appearance: He is well-developed. He is ill-appearing.   HENT:      Head: Normocephalic.      Mouth/Throat:      Mouth: Mucous membranes are moist.   Eyes:      Pupils: Pupils are equal, round, and reactive to light.   Cardiovascular:      Rate and Rhythm: Normal rate. Rhythm irregular.   Pulmonary:      Effort: Pulmonary effort is normal.   Abdominal:      General: Abdomen is flat.      Palpations: Abdomen is soft.   Musculoskeletal:         General: No swelling.   Skin:     General: Skin is warm and dry.            Vents:  Vent Mode: A/C (03/09/24 0827)  Set Rate: 12 BPM (03/09/24 0827)  Vt Set: 480 mL (03/09/24 0827)  Pressure Support: 5 cmH20 (03/05/24 1431)  PEEP/CPAP: 5 cmH20 (03/09/24 0827)  Oxygen Concentration (%): 30 (03/09/24 1305)  Peak Airway Pressure: 24 cmH20 (03/09/24 0827)  Plateau Pressure: 8.2 cmH20 (03/09/24 0827)  Total Ve: 6.68 L/m (03/09/24 0827)  Negative Inspiratory Force (cm H2O): 0 (03/09/24 1305)  F/VT Ratio<105 (RSBI): (!) 58.33 (03/09/24 0827)  Lines/Drains/Airways       Central Venous Catheter Line  Duration             Introducer 03/06/24 0454 Internal Jugular Right 3 days              Drain  Duration                  NG/OG Tube 03/04/24 1258 Center mouth 5 days         Urethral Catheter 03/04/24 0249 Silicone 16 Fr. 5 days              Airway  Duration                  Airway - Non-Surgical 03/04/24 1255 Endotracheal Tube 5 days              Arterial Line  Duration             Arterial Line 03/06/24 0258 Left Radial 3 days              Peripheral Intravenous Line  Duration                   Peripheral IV - Single Lumen 20 G Anterior;Distal;Left Upper Arm -- days         Peripheral IV - Single Lumen 03/04/24 18 G Right;Posterior Wrist 5 days                  Significant Labs:    CBC/Anemia Profile:  Recent Labs   Lab 03/08/24 0400 03/09/24 0424   WBC 4.75 7.63   HGB 7.7* 7.5*   HCT 23.9* 23.6*   PLT 98* 91*   MCV 84 85   RDW 17.2* 16.9*          Chemistries:  Recent Labs   Lab 03/08/24 0400 03/09/24 0424   * 132*   K 4.2 4.1    99   CO2 24 25   BUN 20 17   CREATININE 1.5* 1.5*   CALCIUM 9.1 9.1   ALBUMIN 1.7* 1.5*   PROT 6.4 6.3   BILITOT 0.4 0.5   ALKPHOS 93 86   ALT 10 8*   AST 12 12   MG 1.8 1.7   PHOS 3.4 3.1         All pertinent labs within the past 24 hours have been reviewed.    Significant Imaging:  I have reviewed all pertinent imaging results/findings within the past 24 hours.    ABG  Recent Labs   Lab 03/08/24 0420   PH 7.408   PO2 124*   PCO2 41.6   HCO3 26.2   BE 2     Assessment/Plan:     Neuro  Acute encephalopathy  Patient brought from nursing facility d/t altered mentation, more lethargic than normal. Suspect encephalopathy is due to acute infection. Intubated for airway protection.     History of CVA (cerebrovascular accident)  Verbal and follows command at baseline, was not doing upon admission and for 2 days prior. Has right-sided deficits.       Patient intubated for airway protection and being unable to follow commands   MRI unrevealing   EEG monitoring ordered     Cardiac/Vascular  Complete heart block  Concern for complete heart block overnight 3/6. EP consulted. Cordis and A line in place. Following >48hrs w/o evidence of complete heart block, EP advised that Cordis may be removed if desired as patient will not require pacing.    Plan:  - A line, cordis in place     ID  Sepsis  This patient does have evidence of infective focus  My overall impression is sepsis.  Source: Urinary Tract  Antibiotics given-   Antibiotics (72h ago, onward)      Start     Stop Route  "Frequency Ordered    03/07/24 1230  mupirocin 2 % ointment         03/12/24 0859 Nasl 2 times daily 03/07/24 1120    03/04/24 0430  piperacillin-tazobactam (ZOSYN) 4.5 g in dextrose 5 % in water (D5W) 100 mL IVPB (MB+)         03/10/24 2359 IV Every 8 hours (non-standard times) 03/04/24 0320          Latest lactate reviewed-  No results for input(s): "LACTATE", "POCLAC" in the last 72 hours.    Organ dysfunction indicated by Encephalopathy    Fluid challenge Ideal Body Weight- The patient's ideal body weight is Ideal body weight: 70.7 kg (155 lb 13.8 oz) which will be used to calculate fluid bolus of 30 ml/kg for treatment of septic shock.      Post- resuscitation assessment No - Post resuscitation assessment not needed       Will Not start Pressors- Levophed for MAP of 65  Source control achieved by: IV abx    74M with hx CVA, osteomyelitis s/p neurosurgical intervention presents to [facility] from SNF for AMS x 2 days. Found to be hypothermic and hypoglycemic with intermittent readings of hypotension to MAP 55. Lactic acid 1. Suspect urinary source given cloudy urine draining from Rivera on presentation. Patient encephalopathic and intubated for airway protection. S/p 1L warmed IVF, started on Vanc/Zosyn. Urine with citrobacter sensitive to Zosyn. Blood cx w/NG.     Patient failing to improve despite adequate treatment of UTI. Imaging spine given recent spinal fusion and psoas abscess.     -continue Zosyn  -wean Levo as able  -nikki hugger for hypothermia  -f/u MRI spine for possible alternate source of infection    Endocrine  Hypoglycemia  Suspect hypoglycemia may be due to infection. See 'sepsis'    Other  * Hypothermia  Pt's body temp was 87 degrees Farenheit on admission. Pt was shivering, hypoglycemia noted. S/p 1L warmed IVF, nikki hugger in place. Patient now normothermic, continuing to monitor.       Critical Care Daily Checklist:    A: Awake: RASS Goal/Actual Goal: RASS Goal: 3-->very agitated  Actual:     B: " Spontaneous Breathing Trial Performed? Spon. Breathing Trial Initiated?: Initiated (03/09/24 0858)   C: SAT & SBT Coordinated?  Yes                       D: Delirium: CAM-ICU Overall CAM-ICU: Positive   E: Early Mobility Performed? No   F: Feeding Goal: Goals: Meet % of EEN/EPN by RD f/u date  Status: Nutrition Goal Status: goal not met   Current Diet Order   Procedures    Diet NPO      AS: Analgesia/Sedation Fentanyl gtt   T: Thromboembolic Prophylaxis Heparin subq   H: HOB > 300 Yes   U: Stress Ulcer Prophylaxis (if needed) Yes    G: Glucose Control Yes    B: Bowel Function Stool Occurrence: 1   I: Indwelling Catheter (Lines & Rivera) Necessity Yes    D: De-escalation of Antimicrobials/Pharmacotherapies Not indicated    Plan for the day/ETD Imaging studies    Code Status:  Family/Goals of Care: Full Code         Critical secondary to Patient has a condition that poses threat to life and bodily function: septic shock      Critical care was time spent personally by me on the following activities: development of treatment plan with patient or surrogate and bedside caregivers, discussions with consultants, evaluation of patient's response to treatment, examination of patient, ordering and performing treatments and interventions, ordering and review of laboratory studies, ordering and review of radiographic studies, pulse oximetry, re-evaluation of patient's condition. This critical care time did not overlap with that of any other provider or involve time for any procedures.     KRISTOFER MIGUEL MD  Critical Care Medicine  [facility] - Cardiac Medical ICU

## 2024-03-09 NOTE — ASSESSMENT & PLAN NOTE
"This patient does have evidence of infective focus  My overall impression is sepsis.  Source: Urinary Tract  Antibiotics given-   Antibiotics (72h ago, onward)      Start     Stop Route Frequency Ordered    03/07/24 1230  mupirocin 2 % ointment         03/12/24 0859 Nasl 2 times daily 03/07/24 1120    03/04/24 0430  piperacillin-tazobactam (ZOSYN) 4.5 g in dextrose 5 % in water (D5W) 100 mL IVPB (MB+)         03/10/24 2359 IV Every 8 hours (non-standard times) 03/04/24 0320          Latest lactate reviewed-  No results for input(s): "LACTATE", "POCLAC" in the last 72 hours.    Organ dysfunction indicated by Encephalopathy    Fluid challenge Ideal Body Weight- The patient's ideal body weight is Ideal body weight: 70.7 kg (155 lb 13.8 oz) which will be used to calculate fluid bolus of 30 ml/kg for treatment of septic shock.      Post- resuscitation assessment No - Post resuscitation assessment not needed       Will Not start Pressors- Levophed for MAP of 65  Source control achieved by: IV abx    74M with hx CVA, osteomyelitis s/p neurosurgical intervention presents to [facility] from SNF for AMS x 2 days. Found to be hypothermic and hypoglycemic with intermittent readings of hypotension to MAP 55. Lactic acid 1. Suspect urinary source given cloudy urine draining from Rivera on presentation. Patient encephalopathic and intubated for airway protection. S/p 1L warmed IVF, started on Vanc/Zosyn. Urine with citrobacter sensitive to Zosyn. Blood cx w/NG.     Patient failing to improve despite adequate treatment of UTI. Imaging spine given recent spinal fusion and psoas abscess.     -continue Zosyn  -wean Levo as able  -nikki hugger for hypothermia  -f/u MRI spine for possible alternate source of infection  "

## 2024-03-09 NOTE — PLAN OF CARE
MICU DAILY GOALS     Family/Goals of care/Code Status   Code Status: Full Code    24H Vital Sign Range  Temp:  [97.9 °F (36.6 °C)-99.5 °F (37.5 °C)]   Pulse:  []   Resp:  [5-29]   BP: (125-144)/(68-83)   SpO2:  [89 %-100 %]   Arterial Line BP: (109-163)/(44-59)      Shift Events (include procedures and significant events)   No acute events throughout shift. MRI obtained this shift. See results. Consult to neuro noted.    AWAKE RASS: Goal - RASS Goal: 3-->very agitated  Actual - RASS (Anderson Agitation-Sedation Scale): light sedation    Restraint necessity: Not necessary   BREATHE SBT: Pass    Coordinate A & B, analgesics/sedatives Pain:  controlled    SAT: Fail   Delirium CAM-ICU: Overall CAM-ICU: Positive   Early(intubated/ Progressive (non-intubated) Mobility MOVE Screen (INTUBATED ONLY): Fail    Activity: Activity Management: Patient unable to perform activities   Feeding/Nutrition Diet order: Diet/Nutrition Received: tube feeding,     Thrombus DVT prophylaxis: VTE Required Core Measure: Pharmacological prophylaxis initiated/maintained   HOB Elevation Head of Bed (HOB) Positioning: HOB at 45 degrees   Ulcer Prophylaxis GI: yes   Glucose control managed Glycemic Management: blood glucose monitored   Skin Skin assessed during:     Sacrum intact/not altered?   Heels intact/not altered?   Surgical wound?     CHECK ONE!   (no altered skin or altered skin) and sub boxes:  [] No Altered Skin Integrity Present    []Prevention Measures Documented    [] Altered Skin Integrity Present or Discovered   [] LDA present in EPIC, daily doc completed              [] LDA added if not in EPIC (describe wound).                    When describing wound, do not stage, use descriptive words only.    [] Wound Image Taken (required on admit,                   transfer/discharge and every Tuesday)    Wound Care Consulted? No    Attending Nurse:     Second RN/Staff Member:    Bowel Function diarrhea    Indwelling Catheter Necessity       Urethral Catheter 03/04/24 0249 Silicone 16 Fr.-Reason for Continuing Urinary Catheterization: Chronic Indwelling Urinary Catheter on Admission    Introducer 03/06/24 0454 Internal Jugular Right-Line Necessity Review: Hemodynamic instability  Hemodynamic instability    De-escalation Antibiotics No        VS and assessment per flow sheet, patient progressing towards goals as tolerated, plan of care reviewed with family, all concerns addressed, will continue to monitor.

## 2024-03-09 NOTE — SUBJECTIVE & OBJECTIVE
Interval History/Significant Events: No acute events. Patient undergoing imaging studies to further evaluate potential sources of infection.       Review of Systems   Unable to perform ROS: Intubated     Objective:     Vital Signs (Most Recent):  Temp:  (Pt in MRI) (03/09/24 1330)  Pulse: 73 (03/09/24 1330)  Resp: 16 (03/09/24 1105)  BP: (!) 144/83 (03/09/24 1330)  SpO2: 100 % (03/09/24 1330) Vital Signs (24h Range):  Temp:  [97.9 °F (36.6 °C)-99.5 °F (37.5 °C)] 98.8 °F (37.1 °C)  Pulse:  [] 73  Resp:  [5-29] 16  SpO2:  [89 %-100 %] 100 %  BP: (125-144)/(68-83) 144/83  Arterial Line BP: (106-163)/(44-59) 128/56   Weight: 113.9 kg (251 lb 1.7 oz)  Body mass index is 37.08 kg/m².      Intake/Output Summary (Last 24 hours) at 3/9/2024 1406  Last data filed at 3/9/2024 1305  Gross per 24 hour   Intake 1972.1 ml   Output 1110 ml   Net 862.1 ml            Physical Exam  Vitals and nursing note reviewed.   Constitutional:       Appearance: He is well-developed. He is ill-appearing.   HENT:      Head: Normocephalic.      Mouth/Throat:      Mouth: Mucous membranes are moist.   Eyes:      Pupils: Pupils are equal, round, and reactive to light.   Cardiovascular:      Rate and Rhythm: Normal rate. Rhythm irregular.   Pulmonary:      Effort: Pulmonary effort is normal.   Abdominal:      General: Abdomen is flat.      Palpations: Abdomen is soft.   Musculoskeletal:         General: No swelling.   Skin:     General: Skin is warm and dry.            Vents:  Vent Mode: A/C (03/09/24 0827)  Set Rate: 12 BPM (03/09/24 0827)  Vt Set: 480 mL (03/09/24 0827)  Pressure Support: 5 cmH20 (03/05/24 1431)  PEEP/CPAP: 5 cmH20 (03/09/24 0827)  Oxygen Concentration (%): 30 (03/09/24 1305)  Peak Airway Pressure: 24 cmH20 (03/09/24 0827)  Plateau Pressure: 8.2 cmH20 (03/09/24 0827)  Total Ve: 6.68 L/m (03/09/24 0827)  Negative Inspiratory Force (cm H2O): 0 (03/09/24 1305)  F/VT Ratio<105 (RSBI): (!) 58.33 (03/09/24  0827)  Lines/Drains/Airways       Central Venous Catheter Line  Duration             Introducer 03/06/24 0454 Internal Jugular Right 3 days              Drain  Duration                  NG/OG Tube 03/04/24 1258 Center mouth 5 days         Urethral Catheter 03/04/24 0249 Silicone 16 Fr. 5 days              Airway  Duration                  Airway - Non-Surgical 03/04/24 1255 Endotracheal Tube 5 days              Arterial Line  Duration             Arterial Line 03/06/24 0258 Left Radial 3 days              Peripheral Intravenous Line  Duration                  Peripheral IV - Single Lumen 20 G Anterior;Distal;Left Upper Arm -- days         Peripheral IV - Single Lumen 03/04/24 18 G Right;Posterior Wrist 5 days                  Significant Labs:    CBC/Anemia Profile:  Recent Labs   Lab 03/08/24  0400 03/09/24  0424   WBC 4.75 7.63   HGB 7.7* 7.5*   HCT 23.9* 23.6*   PLT 98* 91*   MCV 84 85   RDW 17.2* 16.9*          Chemistries:  Recent Labs   Lab 03/08/24 0400 03/09/24  0424   * 132*   K 4.2 4.1    99   CO2 24 25   BUN 20 17   CREATININE 1.5* 1.5*   CALCIUM 9.1 9.1   ALBUMIN 1.7* 1.5*   PROT 6.4 6.3   BILITOT 0.4 0.5   ALKPHOS 93 86   ALT 10 8*   AST 12 12   MG 1.8 1.7   PHOS 3.4 3.1         All pertinent labs within the past 24 hours have been reviewed.    Significant Imaging:  I have reviewed all pertinent imaging results/findings within the past 24 hours.

## 2024-03-10 LAB
ALBUMIN SERPL BCP-MCNC: 1.4 G/DL (ref 3.5–5.2)
ALLENS TEST: ABNORMAL
ALP SERPL-CCNC: 109 U/L (ref 55–135)
ALT SERPL W/O P-5'-P-CCNC: 8 U/L (ref 10–44)
ANION GAP SERPL CALC-SCNC: 10 MMOL/L (ref 8–16)
AST SERPL-CCNC: 14 U/L (ref 10–40)
BASOPHILS # BLD AUTO: 0.01 K/UL (ref 0–0.2)
BASOPHILS NFR BLD: 0.1 % (ref 0–1.9)
BILIRUB SERPL-MCNC: 0.5 MG/DL (ref 0.1–1)
BUN SERPL-MCNC: 16 MG/DL (ref 8–23)
CALCIUM SERPL-MCNC: 9.3 MG/DL (ref 8.7–10.5)
CHLORIDE SERPL-SCNC: 98 MMOL/L (ref 95–110)
CO2 SERPL-SCNC: 24 MMOL/L (ref 23–29)
CREAT SERPL-MCNC: 1.3 MG/DL (ref 0.5–1.4)
DIFFERENTIAL METHOD BLD: ABNORMAL
EOSINOPHIL # BLD AUTO: 0.1 K/UL (ref 0–0.5)
EOSINOPHIL NFR BLD: 1.1 % (ref 0–8)
ERYTHROCYTE [DISTWIDTH] IN BLOOD BY AUTOMATED COUNT: 17 % (ref 11.5–14.5)
EST. GFR  (NO RACE VARIABLE): 58.4 ML/MIN/1.73 M^2
GLUCOSE SERPL-MCNC: 110 MG/DL (ref 70–110)
HCO3 UR-SCNC: 22.3 MMOL/L (ref 24–28)
HCT VFR BLD AUTO: 22.7 % (ref 40–54)
HGB BLD-MCNC: 7.4 G/DL (ref 14–18)
IMM GRANULOCYTES # BLD AUTO: 0.1 K/UL (ref 0–0.04)
IMM GRANULOCYTES NFR BLD AUTO: 1.1 % (ref 0–0.5)
LYMPHOCYTES # BLD AUTO: 0.7 K/UL (ref 1–4.8)
LYMPHOCYTES NFR BLD: 7.6 % (ref 18–48)
MAGNESIUM SERPL-MCNC: 1.8 MG/DL (ref 1.6–2.6)
MCH RBC QN AUTO: 27.2 PG (ref 27–31)
MCHC RBC AUTO-ENTMCNC: 32.6 G/DL (ref 32–36)
MCV RBC AUTO: 84 FL (ref 82–98)
MONOCYTES # BLD AUTO: 1.4 K/UL (ref 0.3–1)
MONOCYTES NFR BLD: 15 % (ref 4–15)
NEUTROPHILS # BLD AUTO: 6.9 K/UL (ref 1.8–7.7)
NEUTROPHILS NFR BLD: 75.1 % (ref 38–73)
NRBC BLD-RTO: 0 /100 WBC
PCO2 BLDA: 40.8 MMHG (ref 35–45)
PH SMN: 7.34 [PH] (ref 7.35–7.45)
PHOSPHATE SERPL-MCNC: 3.2 MG/DL (ref 2.7–4.5)
PLATELET # BLD AUTO: 98 K/UL (ref 150–450)
PMV BLD AUTO: 12.3 FL (ref 9.2–12.9)
PO2 BLDA: 119 MMHG (ref 80–100)
POC BE: -3 MMOL/L
POC SATURATED O2: 98 % (ref 95–100)
POC TCO2: 24 MMOL/L (ref 23–27)
POCT GLUCOSE: 106 MG/DL (ref 70–110)
POCT GLUCOSE: 111 MG/DL (ref 70–110)
POCT GLUCOSE: 114 MG/DL (ref 70–110)
POCT GLUCOSE: 116 MG/DL (ref 70–110)
POCT GLUCOSE: 132 MG/DL (ref 70–110)
POCT GLUCOSE: 81 MG/DL (ref 70–110)
POTASSIUM SERPL-SCNC: 3.7 MMOL/L (ref 3.5–5.1)
PROT SERPL-MCNC: 6.4 G/DL (ref 6–8.4)
RBC # BLD AUTO: 2.72 M/UL (ref 4.6–6.2)
SAMPLE: ABNORMAL
SITE: ABNORMAL
SODIUM SERPL-SCNC: 132 MMOL/L (ref 136–145)
WBC # BLD AUTO: 9.23 K/UL (ref 3.9–12.7)

## 2024-03-10 PROCEDURE — 25000003 PHARM REV CODE 250

## 2024-03-10 PROCEDURE — C9113 INJ PANTOPRAZOLE SODIUM, VIA: HCPCS

## 2024-03-10 PROCEDURE — 37799 UNLISTED PX VASCULAR SURGERY: CPT

## 2024-03-10 PROCEDURE — 99900026 HC AIRWAY MAINTENANCE (STAT)

## 2024-03-10 PROCEDURE — 80053 COMPREHEN METABOLIC PANEL: CPT

## 2024-03-10 PROCEDURE — 82803 BLOOD GASES ANY COMBINATION: CPT

## 2024-03-10 PROCEDURE — 99232 SBSQ HOSP IP/OBS MODERATE 35: CPT | Mod: ,,, | Performed by: PSYCHIATRY & NEUROLOGY

## 2024-03-10 PROCEDURE — 27100171 HC OXYGEN HIGH FLOW UP TO 24 HOURS

## 2024-03-10 PROCEDURE — 84100 ASSAY OF PHOSPHORUS: CPT

## 2024-03-10 PROCEDURE — 27201109 HC SYSTEM FECAL MANAGEMENT

## 2024-03-10 PROCEDURE — 63600175 PHARM REV CODE 636 W HCPCS

## 2024-03-10 PROCEDURE — 99233 SBSQ HOSP IP/OBS HIGH 50: CPT | Mod: GC,,, | Performed by: INTERNAL MEDICINE

## 2024-03-10 PROCEDURE — 99900035 HC TECH TIME PER 15 MIN (STAT)

## 2024-03-10 PROCEDURE — 83735 ASSAY OF MAGNESIUM: CPT

## 2024-03-10 PROCEDURE — 85025 COMPLETE CBC W/AUTO DIFF WBC: CPT

## 2024-03-10 PROCEDURE — 94003 VENT MGMT INPAT SUBQ DAY: CPT

## 2024-03-10 PROCEDURE — 94761 N-INVAS EAR/PLS OXIMETRY MLT: CPT | Mod: XB

## 2024-03-10 PROCEDURE — 20000000 HC ICU ROOM

## 2024-03-10 PROCEDURE — 25000003 PHARM REV CODE 250: Performed by: INTERNAL MEDICINE

## 2024-03-10 RX ORDER — MAGNESIUM SULFATE HEPTAHYDRATE 40 MG/ML
2 INJECTION, SOLUTION INTRAVENOUS ONCE
Status: COMPLETED | OUTPATIENT
Start: 2024-03-10 | End: 2024-03-10

## 2024-03-10 RX ORDER — HALOPERIDOL 5 MG/ML
5 INJECTION INTRAMUSCULAR EVERY 6 HOURS PRN
Status: DISCONTINUED | OUTPATIENT
Start: 2024-03-10 | End: 2024-03-12

## 2024-03-10 RX ORDER — QUETIAPINE FUMARATE 25 MG/1
25 TABLET, FILM COATED ORAL 2 TIMES DAILY
Status: ON HOLD | COMMUNITY
Start: 2024-02-26 | End: 2024-03-25 | Stop reason: HOSPADM

## 2024-03-10 RX ORDER — PANTOPRAZOLE SODIUM 40 MG/1
40 TABLET, DELAYED RELEASE ORAL DAILY
Status: ON HOLD | COMMUNITY
Start: 2024-02-21 | End: 2024-03-25 | Stop reason: HOSPADM

## 2024-03-10 RX ORDER — NOREPINEPHRINE BITARTRATE/D5W 4MG/250ML
0-3 PLASTIC BAG, INJECTION (ML) INTRAVENOUS CONTINUOUS
Status: DISCONTINUED | OUTPATIENT
Start: 2024-03-10 | End: 2024-03-11

## 2024-03-10 RX ORDER — SODIUM CHLORIDE 9 MG/ML
INJECTION, SOLUTION INTRAVENOUS CONTINUOUS
Status: DISCONTINUED | OUTPATIENT
Start: 2024-03-10 | End: 2024-03-18

## 2024-03-10 RX ORDER — POTASSIUM CHLORIDE 7.45 MG/ML
10 INJECTION INTRAVENOUS
Status: COMPLETED | OUTPATIENT
Start: 2024-03-10 | End: 2024-03-10

## 2024-03-10 RX ADMIN — MICONAZOLE NITRATE 2 % TOPICAL POWDER: at 09:03

## 2024-03-10 RX ADMIN — MICONAZOLE NITRATE 2 % TOPICAL POWDER: at 08:03

## 2024-03-10 RX ADMIN — MUPIROCIN: 20 OINTMENT TOPICAL at 08:03

## 2024-03-10 RX ADMIN — SODIUM CHLORIDE: 9 INJECTION, SOLUTION INTRAVENOUS at 10:03

## 2024-03-10 RX ADMIN — PIPERACILLIN SODIUM AND TAZOBACTAM SODIUM 4.5 G: 4; .5 INJECTION, POWDER, FOR SOLUTION INTRAVENOUS at 05:03

## 2024-03-10 RX ADMIN — PIPERACILLIN SODIUM AND TAZOBACTAM SODIUM 4.5 G: 4; .5 INJECTION, POWDER, FOR SOLUTION INTRAVENOUS at 08:03

## 2024-03-10 RX ADMIN — SILODOSIN 4 MG: 4 CAPSULE ORAL at 08:03

## 2024-03-10 RX ADMIN — MUPIROCIN: 20 OINTMENT TOPICAL at 09:03

## 2024-03-10 RX ADMIN — POTASSIUM CHLORIDE 10 MEQ: 7.46 INJECTION, SOLUTION INTRAVENOUS at 05:03

## 2024-03-10 RX ADMIN — NOREPINEPHRINE BITARTRATE 0.06 MCG/KG/MIN: 4 INJECTION, SOLUTION INTRAVENOUS at 03:03

## 2024-03-10 RX ADMIN — MAGNESIUM SULFATE HEPTAHYDRATE 2 G: 40 INJECTION, SOLUTION INTRAVENOUS at 06:03

## 2024-03-10 RX ADMIN — PIPERACILLIN SODIUM AND TAZOBACTAM SODIUM 4.5 G: 4; .5 INJECTION, POWDER, FOR SOLUTION INTRAVENOUS at 11:03

## 2024-03-10 RX ADMIN — HEPARIN SODIUM 5000 UNITS: 5000 INJECTION INTRAVENOUS; SUBCUTANEOUS at 05:03

## 2024-03-10 RX ADMIN — HEPARIN SODIUM 5000 UNITS: 5000 INJECTION INTRAVENOUS; SUBCUTANEOUS at 02:03

## 2024-03-10 RX ADMIN — HALOPERIDOL LACTATE 5 MG: 5 INJECTION, SOLUTION INTRAMUSCULAR at 04:03

## 2024-03-10 RX ADMIN — POTASSIUM CHLORIDE 10 MEQ: 7.46 INJECTION, SOLUTION INTRAVENOUS at 07:03

## 2024-03-10 RX ADMIN — Medication 250 MCG/HR: at 05:03

## 2024-03-10 RX ADMIN — POTASSIUM CHLORIDE 10 MEQ: 7.46 INJECTION, SOLUTION INTRAVENOUS at 06:03

## 2024-03-10 RX ADMIN — HEPARIN SODIUM 5000 UNITS: 5000 INJECTION INTRAVENOUS; SUBCUTANEOUS at 09:03

## 2024-03-10 RX ADMIN — PANTOPRAZOLE SODIUM 40 MG: 40 INJECTION, POWDER, FOR SOLUTION INTRAVENOUS at 08:03

## 2024-03-10 RX ADMIN — ATORVASTATIN CALCIUM 40 MG: 40 TABLET, FILM COATED ORAL at 08:03

## 2024-03-10 NOTE — ASSESSMENT & PLAN NOTE
"This patient does have evidence of infective focus  My overall impression is sepsis.  Source: Urinary Tract  Antibiotics given-   Antibiotics (72h ago, onward)      Start     Stop Route Frequency Ordered    03/07/24 1230  mupirocin 2 % ointment         03/12/24 0859 Nasl 2 times daily 03/07/24 1120    03/04/24 0430  piperacillin-tazobactam (ZOSYN) 4.5 g in dextrose 5 % in water (D5W) 100 mL IVPB (MB+)         03/10/24 2359 IV Every 8 hours (non-standard times) 03/04/24 0320          Latest lactate reviewed-  No results for input(s): "LACTATE", "POCLAC" in the last 72 hours.    Organ dysfunction indicated by Encephalopathy    Fluid challenge Ideal Body Weight- The patient's ideal body weight is Ideal body weight: 70.7 kg (155 lb 13.8 oz) which will be used to calculate fluid bolus of 30 ml/kg for treatment of septic shock.      Post- resuscitation assessment No - Post resuscitation assessment not needed       Will Not start Pressors- Levophed for MAP of 65  Source control achieved by: IV abx    74M with hx CVA, osteomyelitis s/p neurosurgical intervention presents to [facility] from SNF for AMS x 2 days. Found to be hypothermic and hypoglycemic with intermittent readings of hypotension to MAP 55. Lactic acid 1. Suspect urinary source given cloudy urine draining from Rivera on presentation. Patient encephalopathic and intubated for airway protection. S/p 1L warmed IVF, started on Vanc/Zosyn. Urine with citrobacter sensitive to Zosyn. Blood cx w/NG.     Patient failing to improve despite adequate treatment of UTI. Imaging spine given recent spinal fusion and psoas abscess; MRI shows no concern for active infection in psoas or spine. Neurology consulted for further assistance.     -continue Zosyn  -wean Levo as able  -nikki hugger for hypothermia    "

## 2024-03-10 NOTE — ASSESSMENT & PLAN NOTE
Patient brought from nursing facility d/t altered mentation, more lethargic than normal. Suspect encephalopathy is due to acute infection. Intubated for airway protection. Extubated to room air 3/10. Neurology consulted given persistent encephalopathy despite treatment of UTI.     -f/u neurology recs

## 2024-03-10 NOTE — PLAN OF CARE
MICU DAILY GOALS     Family/Goals of care/Code Status   Code Status: Full Code    24H Vital Sign Range  Temp:  [97.9 °F (36.6 °C)-98.8 °F (37.1 °C)]   Pulse:  []   Resp:  [6-42]   BP: (93)/(55)   SpO2:  [94 %-100 %]   Arterial Line BP: (110-160)/(48-70)      Shift Events (include procedures and significant events)   Pt extubated to RA @ 1400 tolerated well.     AWAKE RASS: Goal - RASS Goal: -1-->drowsy  Actual - RASS (Anderson Agitation-Sedation Scale): alert and calm    Restraint necessity: Not necessary   BREATHE SBT: Not intubated    Coordinate A & B, analgesics/sedatives Pain:  n/a    SAT: Not intubated   Delirium CAM-ICU: Overall CAM-ICU: Positive   Early(intubated/ Progressive (non-intubated) Mobility MOVE Screen (INTUBATED ONLY): Not intubated    Activity: Activity Management: Patient unable to perform activities   Feeding/Nutrition Diet order: Diet/Nutrition Received: NPO,     Thrombus DVT prophylaxis: VTE Required Core Measure: Pharmacological prophylaxis initiated/maintained   HOB Elevation Head of Bed (HOB) Positioning: HOB at 45 degrees   Ulcer Prophylaxis GI: yes   Glucose control managed Glycemic Management: blood glucose monitored   Skin Skin assessed during:     Sacrum intact/not altered?   Heels intact/not altered?   Surgical wound?     CHECK ONE!   (no altered skin or altered skin) and sub boxes:  [] No Altered Skin Integrity Present    []Prevention Measures Documented    [] Altered Skin Integrity Present or Discovered   [] LDA present in EPIC, daily doc completed              [] LDA added if not in EPIC (describe wound).                    When describing wound, do not stage, use descriptive words only.    [] Wound Image Taken (required on admit,                   transfer/discharge and every Tuesday)    Wound Care Consulted? No    Attending Nurse:     Second RN/Staff Member:    Bowel Function diarrhea    Indwelling Catheter Necessity      Urethral Catheter 03/04/24 0249 Silicone 16  Fr.-Reason for Continuing Urinary Catheterization: Chronic Indwelling Urinary Catheter on Admission    Introducer 03/06/24 0454 Internal Jugular Right-Line Necessity Review: Hemodynamic instability  Hemodynamically unstable    De-escalation Antibiotics No        VS and assessment per flow sheet, patient progressing towards goals as tolerated, plan of care reviewed with family, all concerns addressed, will continue to monitor.

## 2024-03-10 NOTE — CONSULTS
[facility] - Cardiac Medical ICU  Neurology  Consult Note    Patient Name: Bj Pate Jr.  MRN: [id number]  Admission Date: 3/4/2024  Hospital Length of Stay: 6 days  Code Status: Full Code   Attending Provider: Aiden Reyes*   Consulting Provider: Dionisio Irvin MD  Primary Care Physician: Jose Antonio Foster MD  Principal Problem:Hypothermia    Inpatient consult to Neurology  Consult performed by: Dionisio Irvin MD  Consult ordered by: Nitesh Ricks MD         Subjective:     Chief Complaint:  encephalopathy     HPI:   Neurology consulted for encephalopathy evaluation in Bj Pate Jr., a 74 y.o. male with history of stroke with residual right sided deficits, diabetes, and hypertension admitted after initial presentation from SNF with altered mental status. Hospital course/evaluation/diagnostics notable for intubation, UTI, and AV block. Extubated after evaluation.     Past Medical History:   Diagnosis Date    Acute on chronic systolic heart failure 12/13/2023    Anemia 06/04/2021    Anticoagulant long-term use     Basal ganglia hemorrhage     Left basal ganglia hemorrhage with resultant right-sided hemiparesis which has resolved.     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        Past Surgical History:   Procedure Laterality Date    CARPAL TUNNEL RELEASE Left 2/19/2020    Procedure: RELEASE, CARPAL TUNNEL LEFT;  Surgeon: Maria Luisa WOODY  MD Kaz;  Location: [facility] OR;  Service: Orthopedics;  Laterality: Left;    COLONOSCOPY N/A 7/28/2023    Procedure: COLONOSCOPY;  Surgeon: Jaylene Alvarado MD;  Location: [facility] ENDO;  Service: Endoscopy;  Laterality: N/A;    EPIDURAL STEROID INJECTION N/A 5/2/2019    Procedure: Injection, Steroid, Epidural Cervical;  Surgeon: Dariel Doan Jr., MD;  Location: [facility] ENDO;  Service: Pain Management;  Laterality: N/A;  Cervical Epidural Steroid Injection     60441    Arrive @ 1130; ASA; Check BG    EPIDURAL STEROID INJECTION Bilateral 5/29/2019    Procedure: Lumbar Medial Branch Blocks;  Surgeon: Dariel Doan Jr., MD;  Location: [facility];  Service: Pain Management;  Laterality: Bilateral;  Bilateral Lumbar Medial Branch Blocks L3, L4, L5    [id number]  [id number]    Attive @ 1030 (11 arrival request); NO Sedation; ASA; Check BG    EPIDURAL STEROID INJECTION Bilateral 7/3/2019    Procedure: Lumbar Medial Branch Blocks;  Surgeon: Dariel Doan Jr., MD;  Location: [facility];  Service: Pain Management;  Laterality: Bilateral;  Bilateral Lumbar Medial Branch Blocks L3, L4, L5    80670  85595    Arrive @ 0930; NO Sedation; ASA; Check BG    EPIDURAL STEROID INJECTION N/A 8/7/2019    Procedure: Injection, Steroid, Epidural Cervical;  Surgeon: Dariel Doan Jr., MD;  Location: [facility];  Service: Pain Management;  Laterality: N/A;  Cervical Epidural Steroid Injection C7-T1    [id number]    Arrive @ 1115; Last ASA 7/30; Check BG    ESOPHAGOGASTRODUODENOSCOPY N/A 7/28/2023    Procedure: EGD (ESOPHAGOGASTRODUODENOSCOPY);  Surgeon: Jaylene Alvarado MD;  Location: [facility];  Service: Endoscopy;  Laterality: N/A;  inst via portal    FUSION, SPINE, LUMBAR, TLIF, POSTERIOR APPROACH, USING PEDICLE SCREW N/A 12/15/2023    Procedure: L-1 TO L-4 FUSION, SPINE, LUMBAR, TLIF, POSTERIOR APPROACH, USING PEDICLE SCREW;  Surgeon: Rolf Rincon MD;  [facility];  Service: Neurosurgery;  Laterality: N/A;    LEFT  HEART CATHETERIZATION Left 9/21/2021    Procedure: Left heart cath 9am start, R rad access;  Surgeon: Dariel Conner MD;  Location: [facility] CATH LAB;  Service: Cardiology;  Laterality: Left;  RN PRE OP Covid NEGATIVE ON  9-20-21.  C A    MIDLINE CATHETER PLACEMENT  12/12/2023    NO PAST SURGERIES         Review of patient's allergies indicates:   Allergen Reactions    Tomato (solanum lycopersicum) Hives    Naproxen Hives    Shrimp Other (See Comments)       No current facility-administered medications on file prior to encounter.     Current Outpatient Medications on File Prior to Encounter   Medication Sig    atorvastatin (LIPITOR) 40 MG tablet Take 1 tablet (40 mg total) by mouth once daily.    pantoprazole (PROTONIX) 40 MG tablet Take 40 mg by mouth once daily.    QUEtiapine (SEROQUEL) 25 MG Tab Take 25 mg by mouth 2 (two) times daily.    tamsulosin (FLOMAX) 0.4 mg Cap Take 0.4 mg by mouth once daily.    aspirin (ECOTRIN) 81 MG EC tablet Take 81 mg by mouth once daily.    blood sugar diagnostic Strp To check BG 2 times daily, to use with insurance preferred meter    colchicine (COLCRYS) 0.6 mg tablet Take 1 tablet (0.6 mg total) by mouth every Mon, Wed, Fri.    fluticasone-salmeterol diskus inhaler 500-50 mcg Inhale 1 puff into the lungs 2 (two) times daily. Controller    furosemide (LASIX) 80 MG tablet Take 1 tablet (80 mg total) by mouth once daily.    hydroCHLOROthiazide (HYDRODIURIL) 25 MG tablet Take 25 mg by mouth once daily.    Lactobacillus rhamnosus GG (CULTURELLE) 10 billion cell capsule 1 capsule by Per G Tube route once daily.    lancets Misc To check BG 2 times daily, to use with insurance preferred meter    losartan (COZAAR) 25 MG tablet Take 1 tablet (25 mg total) by mouth once daily.    psyllium husk, aspartame, (METAMUCIL) 3.4 gram PwPk packet 1 packet by Per NG tube route 2 (two) times daily.    [DISCONTINUED] nystatin (MYCOSTATIN) 100,000 unit/mL suspension Take 6 mLs (600,000 Units total) by  mouth 4 (four) times daily. for 10 days    [DISCONTINUED] potassium bicarbonate (K-LYTE) disintegrating tablet 1 tablet (25 mEq total) by Per G Tube route once daily. Stop 1/4/2024. May take orally if NG tube not available     Family History       Problem Relation (Age of Onset)    Diabetes Paternal Grandfather    Heart disease Paternal Grandfather    Hypertension     No Known Problems Mother, Father, Sister, Brother, Maternal Aunt, Maternal Uncle, Paternal Aunt, Paternal Uncle, Maternal Grandmother, Maternal Grandfather, Paternal Grandmother          Tobacco Use    Smoking status: Never    Smokeless tobacco: Never   Substance and Sexual Activity    Alcohol use: Yes     Alcohol/week: 0.0 standard drinks of alcohol     Comment: occacionally    Drug use: No    Sexual activity: Not Currently     Review of Systems   Unable to perform ROS: Other     Objective:     Vital Signs (Most Recent):  Temp: 98.3 °F (36.8 °C) (03/10/24 1505)  Pulse: 110 (03/10/24 1805)  Resp: (!) 27 (03/10/24 1805)  BP: (!) 93/55 (03/09/24 2335)  SpO2: 96 % (03/10/24 1805) Vital Signs (24h Range):  Temp:  [98.2 °F (36.8 °C)-98.8 °F (37.1 °C)] 98.3 °F (36.8 °C)  Pulse:  [] 110  Resp:  [6-42] 27  SpO2:  [94 %-100 %] 96 %  BP: (93)/(55) 93/55  Arterial Line BP: (110-157)/(48-70) 130/62     Weight: 114 kg (251 lb 5.2 oz)  Body mass index is 37.11 kg/m².     Physical Exam  Vitals and nursing note reviewed.   Constitutional:       Interventions: He is intubated and restrained.   Pulmonary:      Effort: He is intubated.          NEUROLOGICAL EXAMINATION:     MENTAL STATUS   Follows 1 step (Intermittent) commands.   Level of consciousness: drowsy    CRANIAL NERVES        Closes eyes, wrinkles nose     MOTOR EXAM        Moves 4 extremities spontaneously and to command     SENSORY EXAM        Moves all 4 extremities to light touch       Significant Labs:   Recent Lab Results  (Last 5 results in the past 24 hours)        03/10/24  1644   03/10/24  1146    03/10/24  0839   03/10/24  0606   03/10/24  0355        Albumin         1.4       ALP         109       Allens Test       N/A         ALT         8       Anion Gap         10       AST         14       Baso #         0.01       Basophil %         0.1       BILIRUBIN TOTAL         0.5  Comment: For infants and newborns, interpretation of results should be based  on gestational age, weight and in agreement with clinical  observations.    Premature Infant recommended reference ranges:  Up to 24 hours.............<8.0 mg/dL  Up to 48 hours............<12.0 mg/dL  3-5 days..................<15.0 mg/dL  6-29 days.................<15.0 mg/dL         Site       [facility]         BUN         16       Calcium         9.3       Chloride         98       CO2         24       Creatinine         1.3       Differential Method         Automated       eGFR         58.4       Eos #         0.1       Eos %         1.1       Glucose         110       Gran # (ANC)         6.9       Gran %         75.1       Hematocrit         22.7       Hemoglobin         7.4       Immature Grans (Abs)         0.10  Comment: Mild elevation in immature granulocytes is non specific and   can be seen in a variety of conditions including stress response,   acute inflammation, trauma and pregnancy. Correlation with other   laboratory and clinical findings is essential.         Immature Granulocytes         1.1       Lymph #         0.7       Lymph %         7.6       Magnesium          1.8       MCH         27.2       MCHC         32.6       MCV         84       Mono #         1.4       Mono %         15.0       MPV         12.3       nRBC         0       Phosphorus Level         3.2       Platelet Count         98       POC BE       -3         POC HCO3       22.3         POC PCO2       40.8         POC PH       7.345         POC PO2       119         POC SATURATED O2       98         POC TCO2       24         POCT Glucose 116   111   132            Potassium         3.7       PROTEIN TOTAL         6.4       RBC         2.72       RDW         17.0       Sample       ARTERIAL         Sodium         132       WBC         9.23                            All pertinent lab results from the past 24 hours have been reviewed.    Significant Imaging:   MRI Spine Cervical-Thoracic-Lumbar W W/O Contrast (XPD) 03/09/2024    Addendum 3/10/2024  8:06 AM  The bilateral psoas musculature demonstrate no definite fluid collection within.      Electronically signed by: Laure Reynoso MD  Date:    03/10/2024  Time:    08:03    Narrative  EXAMINATION:  MRI SPINE CERVICAL-THORACIC-LUMBAR W W/O CONTRAST (XPD)    CLINICAL HISTORY:  concern for infectious process;    TECHNIQUE:  Multiplanar multi sequences images of the cervical, thoracic and lumbar spine before and after the administration of 10 cc of IV contrast.    COMPARISON:  MRI cervicothoracic and lumbar spine 12/25/2023    FINDINGS: SEVERE MOTION ARTIFACTS  MRI cervical spine: The alignment of the cervical spine is normal.  The vertebral body heights are well maintained.  Severe disc space narrowing C3-4 through C7-T1.  Nonspecific diffuse mild marrow edema C3-C4 C5 vertebrae and upper endplate of C6.  No fluid within the intervertebral disc space.  Mild postcontrast anterior epidural enhancement from C1 through C6, could be inflammatory/infectious or degenerative.    There is posterior disc osteophyte complex and ligamentum flavum hypertrophy at all level of the cervical spine.  No evidence of myelomalacia.  Fluid within the oropharynx. The remainder of the paravertebral soft tissues appear normal.    C2-C3: Left facet joint osseous hypertrophy, mild left foraminal narrowing.    C3-C4: Posterior disc osteophyte complex, bilateral uncovertebral spur, mild canal stenosis.  Moderate left and mild right foraminal narrowing.    C4-C5: Posterior disc osteophyte complex, left uncovertebral spur, mild canal stenosis.  Severe  left foraminal narrowing.    C5-C6: Mild canal stenosis.  Mild bilateral foraminal narrowing.    C6-C7: Posterior disc osteophyte complex, bilateral uncovertebral spur, moderate canal stenosis, moderate bilateral foraminal narrowing.    C7-T1: Posterior disc osteophyte complex, right uncovertebral spur, no canal stenosis.  Severe right foraminal narrowing.    MRI thoracic spine: The alignment of the thoracic spine is normal.  The vertebral body heights are well maintained.  No fracture, no osseous lesions.  Increased signal within the T10-11 disc space and T12-L1 disc space, unchanged from the prior study.  No strong evidence for discitis or osteomyelitis.  Bilateral pleural effusion, atelectasis of the right lower lobe and subsegmental atelectasis at the dependent portion of the right upper lobe.  No myelomalacia.    Posterior disc osteophyte complex T4-5 through T11-12 worse at T6-7.  There is mild canal stenosis at T6-7.  Moderate right foraminal narrowing at T2-3:.  Moderate bilateral foraminal narrowing at the right T4-5 foramina.  Severe left foraminal narrowing at T5-6.  Severe bilateral foraminal narrowing at T6-T7 and T7-T8.  Severe bilateral foraminal narrowing at T9-10.  Post-contrast enhancement demonstrate no definite abnormal enhancement of the spinal canal and its content.    MRI lumbar spine: Postoperative changes of posterior instrumented fusion L1 through L4.  Intervertebral disc spacer L2-3.  Laminectomy L1 through L4.  The alignment of the lumbar spine is within normal limits.  The vertebral body height are well maintained.  No acute fracture, no osseous lesion.  The conus terminates at T12.    T12-L1: Unremarkable    L1-L2: No canal stenosis.  There is mild left foraminal narrowing.    L2-L3: Artifact from the hardware.  No apparent canal stenosis or foraminal narrowing.    L3-L4: Diffuse disc bulge, unchanged no apparent canal stenosis or significant foraminal narrowing.    L4-5: Diffuse disc  bulge and severe facet joint osseous hypertrophy, there is severe canal stenosis, severe left and moderate right foraminal narrowing.    L5-S1: Right lateral bridging osteophyte.  No canal stenosis.  There is mild right foraminal narrowing.  No abnormal fluid collection in the surgical bed region.  There is dependent subcutaneous soft tissue edema.    The upper sacrum and upper sacroiliac joints appear normal.  The bilateral psoas musculature demonstrate no fluid collection within.  Paraspinal muscle atrophy.    Post-contrast images demonstrate enhancement at the left L2-L3 intervertebral disc region, enhancement in the soft tissue of the posterior lumbar surgical bed region, enhancement left L4-L5 facet joint mild, enhancement of the C3-C4 C5 vertebrae and associated osteophytes.    Impression  Left L2-L3 intervertebral disc space enhancement slightly more striking compared to the prior exam, could represent chronic discitis or granulomatous tissue.    Stable edema and mild postcontrast enhancement C3 through C5-6, could represent chronic spondylo discitis, cannot be compared to the prior study where there was significant motion artifacts.  Minimal dural enhancement at this level could be reactive or ongoing chronic infection.    Left facet joint osseous hypertrophy and postcontrast enhancement L4-5 similar to prior study continue follow-up advised.      Electronically signed by: Laure Reynoso MD  Date:    03/09/2024  Time:    14:38    I have reviewed all pertinent imaging results/findings within the past 24 hours.    Assessment and Plan:     Acute encephalopathy  Patient appears to initially of had gross cerebral dysfunction secondary to extrinsic factors (UTI) in setting of poor cognitive reserve. Patient appears to have improved from day prior as he is following 1 step commands on neurologic exam. In addition, electronic documentation reveals patient has been extubated. Suspect extended return to baseline  due to history and clinical presentation. Will follow up tomorrow to re-evaluate extubated, however, likely requires extended time while metabolic/toxic/infectious medical problems are controlled for.         VTE Risk Mitigation (From admission, onward)           Ordered     heparin (porcine) injection 5,000 Units  Every 8 hours         03/05/24 1100     IP VTE HIGH RISK PATIENT  Once         03/04/24 0316     Place sequential compression device  Until discontinued         03/04/24 0316                    Thank you for your consult. I will follow-up with patient. Please contact us if you have any additional questions.    Dionisio Irvin MD  Neurology  [facility] - Cardiac Medical ICU

## 2024-03-10 NOTE — PROGRESS NOTES
[facility] - Cardiac Medical ICU  Critical Care Medicine  Progress Note    Patient Name: Bj Pate Jr.  MRN: [id number]  Admission Date: 3/4/2024  Hospital Length of Stay: 6 days  Code Status: Full Code  Attending Provider: Aiden Reyes*  Primary Care Provider: Jose Antonio Foster MD   Principal Problem: Hypothermia    Subjective:     HPI:  73M with PMH CVA with R sided deficits, DM, HTN who presents to the ED from SNF for AMS. Per chart, he was more somnolent than usual over the last 2 days. At baseline he is verbal and follows commands but does not ambulate. On arrival to ED he was hypothermic to 87.9F and found to be hypoglycemic to the 50s. Intermittently hypotensive to MAP 55. Patient is awake but does not interact meaningfully on exam. Chronic Rivera was initially draining cloudy urine with sediment, Rivera exchanged. Initial labs without leukocytosis, CMP unremarkable, VBG with pH 7.49/30.3/38.9, lactate normal. Received 1L NS bolus through warming pump. Initially admitted to , critical care consulted for severe hypothermia with hypotension.     Hospital/ICU Course:  Patient intubated 3/4 for airway protection. Sedated and requiring Levophed. Urine with Citrobacter, abx narrowed accordingly. Developed AV block, EP consulted and cordis CVC placed. TVP deemed unnecessary given no evidence of 3rd degree heart block. Extubated to room air 3/10.     Interval History/Significant Events: No acute events. Extubated to room air today, following some commands off of fentanyl gtt.     Review of Systems   Unable to perform ROS: Mental status change     Objective:     Vital Signs (Most Recent):  Temp: 98.8 °F (37.1 °C) (03/10/24 1105)  Pulse: (!) 114 (03/10/24 1404)  Resp: (!) 23 (03/10/24 1404)  BP: (!) 93/55 (03/09/24 2335)  SpO2: 99 % (03/10/24 1404) Vital Signs (24h Range):  Temp:  [97.9 °F (36.6 °C)-99.4 °F (37.4 °C)] 98.8 °F (37.1 °C)  Pulse:  [] 114  Resp:  [6-42] 23  SpO2:  [92 %-100 %] 99 %  BP:  (93)/(55) 93/55  Arterial Line BP: (110-161)/(48-70) 122/61   Weight: 114 kg (251 lb 5.2 oz)  Body mass index is 37.11 kg/m².      Intake/Output Summary (Last 24 hours) at 3/10/2024 1415  Last data filed at 3/10/2024 1305  Gross per 24 hour   Intake 2143.3 ml   Output 1305 ml   Net 838.3 ml            Physical Exam  Vitals and nursing note reviewed.   Constitutional:       Appearance: He is well-developed. He is ill-appearing.   HENT:      Head: Normocephalic.      Mouth/Throat:      Mouth: Mucous membranes are moist.   Eyes:      Pupils: Pupils are equal, round, and reactive to light.   Cardiovascular:      Rate and Rhythm: Regular rhythm. Tachycardia present.   Pulmonary:      Effort: Pulmonary effort is normal.   Abdominal:      General: Abdomen is flat.      Palpations: Abdomen is soft.   Musculoskeletal:         General: No swelling.   Skin:     General: Skin is warm and dry.            Vents:  Vent Mode: Spont (03/10/24 1207)  Set Rate: 12 BPM (03/10/24 0740)  Vt Set: 480 mL (03/10/24 0740)  Pressure Support: 5 cmH20 (03/10/24 1207)  PEEP/CPAP: 5 cmH20 (03/10/24 1207)  Oxygen Concentration (%): 30 (03/10/24 1404)  Peak Airway Pressure: 11 cmH20 (03/10/24 1207)  Plateau Pressure: 8.2 cmH20 (03/10/24 1207)  Total Ve: 8.82 L/m (03/10/24 1207)  Negative Inspiratory Force (cm H2O): 0 (03/10/24 1305)  F/VT Ratio<105 (RSBI): (!) 58.05 (03/10/24 1207)  Lines/Drains/Airways       Central Venous Catheter Line  Duration             Introducer 03/06/24 0454 Internal Jugular Right 4 days              Drain  Duration                  NG/OG Tube 03/04/24 1258 Center mouth 6 days         Urethral Catheter 03/04/24 0249 Silicone 16 Fr. 6 days         Fecal Incontinence  03/09/24 1500 <1 day              Arterial Line  Duration             Arterial Line 03/06/24 0258 Left Radial 4 days              Peripheral Intravenous Line  Duration                  Peripheral IV - Single Lumen 20 G Anterior;Distal;Left Upper Arm --  days         Peripheral IV - Single Lumen 03/04/24 18 G Right;Posterior Wrist 6 days                  Significant Labs:    CBC/Anemia Profile:  Recent Labs   Lab 03/09/24  0424 03/10/24  0355   WBC 7.63 9.23   HGB 7.5* 7.4*   HCT 23.6* 22.7*   PLT 91* 98*   MCV 85 84   RDW 16.9* 17.0*          Chemistries:  Recent Labs   Lab 03/09/24  0424 03/10/24  0355   * 132*   K 4.1 3.7   CL 99 98   CO2 25 24   BUN 17 16   CREATININE 1.5* 1.3   CALCIUM 9.1 9.3   ALBUMIN 1.5* 1.4*   PROT 6.3 6.4   BILITOT 0.5 0.5   ALKPHOS 86 109   ALT 8* 8*   AST 12 14   MG 1.7 1.8   PHOS 3.1 3.2         All pertinent labs within the past 24 hours have been reviewed.    Significant Imaging:  I have reviewed all pertinent imaging results/findings within the past 24 hours.    ABG  Recent Labs   Lab 03/10/24  0606   PH 7.345*   PO2 119*   PCO2 40.8   HCO3 22.3*   BE -3*     Assessment/Plan:     Neuro  Acute encephalopathy  Patient brought from nursing facility d/t altered mentation, more lethargic than normal. Suspect encephalopathy is due to acute infection. Intubated for airway protection. Extubated to room air 3/10. Neurology consulted given persistent encephalopathy despite treatment of UTI.     -f/u neurology recs    History of CVA (cerebrovascular accident)  Verbal and follows command at baseline, was not doing upon admission and for 2 days prior. Has right-sided deficits.       Patient intubated for airway protection and being unable to follow commands   MRI unrevealing   EEG monitoring ordered     Cardiac/Vascular  Complete heart block  Concern for complete heart block overnight 3/6. EP consulted. Cordis and A line in place. Following >48hrs w/o evidence of complete heart block, EP advised that Cordis may be removed if desired as patient will not require pacing.    Plan:  - A line, cordis in place     ID  Sepsis  This patient does have evidence of infective focus  My overall impression is sepsis.  Source: Urinary Tract  Antibiotics  "given-   Antibiotics (72h ago, onward)      Start     Stop Route Frequency Ordered    03/07/24 1230  mupirocin 2 % ointment         03/12/24 0859 Nasl 2 times daily 03/07/24 1120    03/04/24 0430  piperacillin-tazobactam (ZOSYN) 4.5 g in dextrose 5 % in water (D5W) 100 mL IVPB (MB+)         03/10/24 2359 IV Every 8 hours (non-standard times) 03/04/24 0320          Latest lactate reviewed-  No results for input(s): "LACTATE", "POCLAC" in the last 72 hours.    Organ dysfunction indicated by Encephalopathy    Fluid challenge Ideal Body Weight- The patient's ideal body weight is Ideal body weight: 70.7 kg (155 lb 13.8 oz) which will be used to calculate fluid bolus of 30 ml/kg for treatment of septic shock.      Post- resuscitation assessment No - Post resuscitation assessment not needed       Will Not start Pressors- Levophed for MAP of 65  Source control achieved by: IV abx    74M with hx CVA, osteomyelitis s/p neurosurgical intervention presents to [facility] from SNF for AMS x 2 days. Found to be hypothermic and hypoglycemic with intermittent readings of hypotension to MAP 55. Lactic acid 1. Suspect urinary source given cloudy urine draining from Rivera on presentation. Patient encephalopathic and intubated for airway protection. S/p 1L warmed IVF, started on Vanc/Zosyn. Urine with citrobacter sensitive to Zosyn. Blood cx w/NG.     Patient failing to improve despite adequate treatment of UTI. Imaging spine given recent spinal fusion and psoas abscess; MRI shows no concern for active infection in psoas or spine. Neurology consulted for further assistance.     -continue Zosyn  -wean Levo as able  -nikki hugger for hypothermia      Endocrine  Hypoglycemia  Suspect hypoglycemia may be due to infection. See 'sepsis'    Other  * Hypothermia  Pt's body temp was 87 degrees Farenheit on admission. Pt was shivering, hypoglycemia noted. S/p 1L warmed IVF, nikki hugger in place. Patient now normothermic, continuing to monitor.       " Critical Care Daily Checklist:    A: Awake: RASS Goal/Actual Goal: RASS Goal: -1-->drowsy  Actual:     B: Spontaneous Breathing Trial Performed? Spon. Breathing Trial Initiated?: Initiated (03/09/24 0858)   C: SAT & SBT Coordinated?                        D: Delirium: CAM-ICU Overall CAM-ICU: Positive   E: Early Mobility Performed? Yes   F: Feeding Goal: Goals: Meet % of EEN/EPN by RD f/u date  Status: Nutrition Goal Status: goal not met   Current Diet Order   Procedures    Diet NPO      AS: Analgesia/Sedation    T: Thromboembolic Prophylaxis Yes    H: HOB > 300 Yes   U: Stress Ulcer Prophylaxis (if needed) Yes    G: Glucose Control Yes    B: Bowel Function Stool Occurrence: 1   I: Indwelling Catheter (Lines & Rivera) Necessity    D: De-escalation of Antimicrobials/Pharmacotherapies In process    Plan for the day/ETD Monitor post-extubation    Code Status:  Family/Goals of Care: Full Code         Critical secondary to Patient has a condition that poses threat to life and bodily function: acute encephalopathy      Critical care was time spent personally by me on the following activities: development of treatment plan with patient or surrogate and bedside caregivers, discussions with consultants, evaluation of patient's response to treatment, examination of patient, ordering and performing treatments and interventions, ordering and review of laboratory studies, ordering and review of radiographic studies, pulse oximetry, re-evaluation of patient's condition. This critical care time did not overlap with that of any other provider or involve time for any procedures.     KRISTOFER MIGUEL MD  Critical Care Medicine  [facility] - Cardiac Medical ICU

## 2024-03-10 NOTE — PLAN OF CARE
MICU DAILY GOALS     Family/Goals of care/Code Status   Code Status: Full Code    24H Vital Sign Range  Temp:  [97.9 °F (36.6 °C)-99.4 °F (37.4 °C)]   Pulse:  []   Resp:  [6-28]   BP: ()/(55-83)   SpO2:  [92 %-100 %]   Arterial Line BP: (110-161)/(48-59)      Shift Events (include procedures and significant events)   No acute events throughout shift, Pt remains intubated and sedated on fent and levo gtts. Lytes replaced.      AWAKE RASS: Goal - RASS Goal: -1-->drowsy  Actual - RASS (Anderson Agitation-Sedation Scale): drowsy    Restraint necessity: Not necessary   BREATHE SBT: Not attempted    Coordinate A & B, analgesics/sedatives Pain: managed   SAT: Not attempted   Delirium CAM-ICU: Overall CAM-ICU: Positive   Early(intubated/ Progressive (non-intubated) Mobility MOVE Screen (INTUBATED ONLY): Fail    Activity: Activity Management: Patient unable to perform activities   Feeding/Nutrition Diet order: Diet/Nutrition Received: tube feeding,     Thrombus DVT prophylaxis: VTE Required Core Measure: Pharmacological prophylaxis initiated/maintained   HOB Elevation Head of Bed (HOB) Positioning: HOB at 30 degrees   Ulcer Prophylaxis GI: yes   Glucose control managed Glycemic Management: blood glucose monitored   Skin Skin assessed during: Q Shift Change    Sacrum intact/not altered? No  Heels intact/not altered? Yes  Surgical wound? Yes    CHECK ONE!   (no altered skin or altered skin) and sub boxes:  [] No Altered Skin Integrity Present    []Prevention Measures Documented    [] Altered Skin Integrity Present or Discovered   [] LDA present in EPIC, daily doc completed              [] LDA added if not in EPIC (describe wound).                    When describing wound, do not stage, use descriptive words only.    [] Wound Image Taken (required on admit,                   transfer/discharge and every Tuesday)    Wound Care Consulted? No    Attending Nurse:     Second RN/Staff Member:    Bowel Function no issues     Indwelling Catheter Necessity      Urethral Catheter 03/04/24 0249 Silicone 16 Fr.-Reason for Continuing Urinary Catheterization: Critically ill in ICU and requiring hourly monitoring of intake/output    Introducer 03/06/24 0454 Internal Jugular Right-Line Necessity Review: Hemodynamic instability     De-escalation Antibiotics No        VS and assessment per flow sheet, patient progressing towards goals as tolerated, plan of care reviewed with  Bj Pate Jr. , all concerns addressed, will continue to monitor.

## 2024-03-10 NOTE — SUBJECTIVE & OBJECTIVE
Interval History/Significant Events: No acute events. Extubated to room air today, following some commands off of fentanyl gtt.     Review of Systems   Unable to perform ROS: Mental status change     Objective:     Vital Signs (Most Recent):  Temp: 98.8 °F (37.1 °C) (03/10/24 1105)  Pulse: (!) 114 (03/10/24 1404)  Resp: (!) 23 (03/10/24 1404)  BP: (!) 93/55 (03/09/24 2335)  SpO2: 99 % (03/10/24 1404) Vital Signs (24h Range):  Temp:  [97.9 °F (36.6 °C)-99.4 °F (37.4 °C)] 98.8 °F (37.1 °C)  Pulse:  [] 114  Resp:  [6-42] 23  SpO2:  [92 %-100 %] 99 %  BP: (93)/(55) 93/55  Arterial Line BP: (110-161)/(48-70) 122/61   Weight: 114 kg (251 lb 5.2 oz)  Body mass index is 37.11 kg/m².      Intake/Output Summary (Last 24 hours) at 3/10/2024 1415  Last data filed at 3/10/2024 1305  Gross per 24 hour   Intake 2143.3 ml   Output 1305 ml   Net 838.3 ml            Physical Exam  Vitals and nursing note reviewed.   Constitutional:       Appearance: He is well-developed. He is ill-appearing.   HENT:      Head: Normocephalic.      Mouth/Throat:      Mouth: Mucous membranes are moist.   Eyes:      Pupils: Pupils are equal, round, and reactive to light.   Cardiovascular:      Rate and Rhythm: Regular rhythm. Tachycardia present.   Pulmonary:      Effort: Pulmonary effort is normal.   Abdominal:      General: Abdomen is flat.      Palpations: Abdomen is soft.   Musculoskeletal:         General: No swelling.   Skin:     General: Skin is warm and dry.            Vents:  Vent Mode: Spont (03/10/24 1207)  Set Rate: 12 BPM (03/10/24 0740)  Vt Set: 480 mL (03/10/24 0740)  Pressure Support: 5 cmH20 (03/10/24 1207)  PEEP/CPAP: 5 cmH20 (03/10/24 1207)  Oxygen Concentration (%): 30 (03/10/24 1404)  Peak Airway Pressure: 11 cmH20 (03/10/24 1207)  Plateau Pressure: 8.2 cmH20 (03/10/24 1207)  Total Ve: 8.82 L/m (03/10/24 1207)  Negative Inspiratory Force (cm H2O): 0 (03/10/24 1305)  F/VT Ratio<105 (RSBI): (!) 58.05 (03/10/24  1207)  Lines/Drains/Airways       Central Venous Catheter Line  Duration             Introducer 03/06/24 0454 Internal Jugular Right 4 days              Drain  Duration                  NG/OG Tube 03/04/24 1258 Center mouth 6 days         Urethral Catheter 03/04/24 0249 Silicone 16 Fr. 6 days         Fecal Incontinence  03/09/24 1500 <1 day              Arterial Line  Duration             Arterial Line 03/06/24 0258 Left Radial 4 days              Peripheral Intravenous Line  Duration                  Peripheral IV - Single Lumen 20 G Anterior;Distal;Left Upper Arm -- days         Peripheral IV - Single Lumen 03/04/24 18 G Right;Posterior Wrist 6 days                  Significant Labs:    CBC/Anemia Profile:  Recent Labs   Lab 03/09/24  0424 03/10/24  0355   WBC 7.63 9.23   HGB 7.5* 7.4*   HCT 23.6* 22.7*   PLT 91* 98*   MCV 85 84   RDW 16.9* 17.0*          Chemistries:  Recent Labs   Lab 03/09/24  0424 03/10/24  0355   * 132*   K 4.1 3.7   CL 99 98   CO2 25 24   BUN 17 16   CREATININE 1.5* 1.3   CALCIUM 9.1 9.3   ALBUMIN 1.5* 1.4*   PROT 6.3 6.4   BILITOT 0.5 0.5   ALKPHOS 86 109   ALT 8* 8*   AST 12 14   MG 1.7 1.8   PHOS 3.1 3.2         All pertinent labs within the past 24 hours have been reviewed.    Significant Imaging:  I have reviewed all pertinent imaging results/findings within the past 24 hours.

## 2024-03-11 LAB
ALBUMIN SERPL BCP-MCNC: 1.5 G/DL (ref 3.5–5.2)
ALP SERPL-CCNC: 92 U/L (ref 55–135)
ALT SERPL W/O P-5'-P-CCNC: 11 U/L (ref 10–44)
ANION GAP SERPL CALC-SCNC: 7 MMOL/L (ref 8–16)
AST SERPL-CCNC: 20 U/L (ref 10–40)
BASOPHILS # BLD AUTO: 0.01 K/UL (ref 0–0.2)
BASOPHILS # BLD AUTO: 0.01 K/UL (ref 0–0.2)
BASOPHILS NFR BLD: 0.1 % (ref 0–1.9)
BASOPHILS NFR BLD: 0.1 % (ref 0–1.9)
BILIRUB SERPL-MCNC: 0.5 MG/DL (ref 0.1–1)
BUN SERPL-MCNC: 16 MG/DL (ref 8–23)
CALCIUM SERPL-MCNC: 9.4 MG/DL (ref 8.7–10.5)
CHLORIDE SERPL-SCNC: 101 MMOL/L (ref 95–110)
CO2 SERPL-SCNC: 26 MMOL/L (ref 23–29)
CREAT SERPL-MCNC: 1.3 MG/DL (ref 0.5–1.4)
DIFFERENTIAL METHOD BLD: ABNORMAL
DIFFERENTIAL METHOD BLD: ABNORMAL
EOSINOPHIL # BLD AUTO: 0.1 K/UL (ref 0–0.5)
EOSINOPHIL # BLD AUTO: 0.2 K/UL (ref 0–0.5)
EOSINOPHIL NFR BLD: 0.9 % (ref 0–8)
EOSINOPHIL NFR BLD: 2.7 % (ref 0–8)
ERYTHROCYTE [DISTWIDTH] IN BLOOD BY AUTOMATED COUNT: 16.9 % (ref 11.5–14.5)
ERYTHROCYTE [DISTWIDTH] IN BLOOD BY AUTOMATED COUNT: 16.9 % (ref 11.5–14.5)
EST. GFR  (NO RACE VARIABLE): 58.4 ML/MIN/1.73 M^2
GLUCOSE SERPL-MCNC: 70 MG/DL (ref 70–110)
HCT VFR BLD AUTO: 21.6 % (ref 40–54)
HCT VFR BLD AUTO: 21.7 % (ref 40–54)
HGB BLD-MCNC: 7 G/DL (ref 14–18)
HGB BLD-MCNC: 7.1 G/DL (ref 14–18)
IMM GRANULOCYTES # BLD AUTO: 0.08 K/UL (ref 0–0.04)
IMM GRANULOCYTES # BLD AUTO: 0.09 K/UL (ref 0–0.04)
IMM GRANULOCYTES NFR BLD AUTO: 1 % (ref 0–0.5)
IMM GRANULOCYTES NFR BLD AUTO: 1.3 % (ref 0–0.5)
LYMPHOCYTES # BLD AUTO: 0.8 K/UL (ref 1–4.8)
LYMPHOCYTES # BLD AUTO: 1 K/UL (ref 1–4.8)
LYMPHOCYTES NFR BLD: 10.1 % (ref 18–48)
LYMPHOCYTES NFR BLD: 13.6 % (ref 18–48)
MAGNESIUM SERPL-MCNC: 1.7 MG/DL (ref 1.6–2.6)
MCH RBC QN AUTO: 27 PG (ref 27–31)
MCH RBC QN AUTO: 27 PG (ref 27–31)
MCHC RBC AUTO-ENTMCNC: 32.3 G/DL (ref 32–36)
MCHC RBC AUTO-ENTMCNC: 32.9 G/DL (ref 32–36)
MCV RBC AUTO: 82 FL (ref 82–98)
MCV RBC AUTO: 84 FL (ref 82–98)
MONOCYTES # BLD AUTO: 1 K/UL (ref 0.3–1)
MONOCYTES # BLD AUTO: 1 K/UL (ref 0.3–1)
MONOCYTES NFR BLD: 12.5 % (ref 4–15)
MONOCYTES NFR BLD: 13.6 % (ref 4–15)
NEUTROPHILS # BLD AUTO: 4.9 K/UL (ref 1.8–7.7)
NEUTROPHILS # BLD AUTO: 6 K/UL (ref 1.8–7.7)
NEUTROPHILS NFR BLD: 68.7 % (ref 38–73)
NEUTROPHILS NFR BLD: 75.4 % (ref 38–73)
NRBC BLD-RTO: 0 /100 WBC
NRBC BLD-RTO: 0 /100 WBC
PHOSPHATE SERPL-MCNC: 2.9 MG/DL (ref 2.7–4.5)
PLATELET # BLD AUTO: 143 K/UL (ref 150–450)
PLATELET # BLD AUTO: 183 K/UL (ref 150–450)
PMV BLD AUTO: 10.4 FL (ref 9.2–12.9)
PMV BLD AUTO: 11.7 FL (ref 9.2–12.9)
POCT GLUCOSE: 100 MG/DL (ref 70–110)
POCT GLUCOSE: 106 MG/DL (ref 70–110)
POCT GLUCOSE: 145 MG/DL (ref 70–110)
POCT GLUCOSE: 263 MG/DL (ref 70–110)
POCT GLUCOSE: 55 MG/DL (ref 70–110)
POCT GLUCOSE: 58 MG/DL (ref 70–110)
POCT GLUCOSE: 59 MG/DL (ref 70–110)
POCT GLUCOSE: 62 MG/DL (ref 70–110)
POCT GLUCOSE: 64 MG/DL (ref 70–110)
POCT GLUCOSE: 70 MG/DL (ref 70–110)
POCT GLUCOSE: 76 MG/DL (ref 70–110)
POTASSIUM SERPL-SCNC: 3.7 MMOL/L (ref 3.5–5.1)
PROT SERPL-MCNC: 6.3 G/DL (ref 6–8.4)
RBC # BLD AUTO: 2.59 M/UL (ref 4.6–6.2)
RBC # BLD AUTO: 2.63 M/UL (ref 4.6–6.2)
SODIUM SERPL-SCNC: 134 MMOL/L (ref 136–145)
WBC # BLD AUTO: 7.08 K/UL (ref 3.9–12.7)
WBC # BLD AUTO: 7.91 K/UL (ref 3.9–12.7)

## 2024-03-11 PROCEDURE — 25000003 PHARM REV CODE 250

## 2024-03-11 PROCEDURE — 92610 EVALUATE SWALLOWING FUNCTION: CPT

## 2024-03-11 PROCEDURE — 80053 COMPREHEN METABOLIC PANEL: CPT

## 2024-03-11 PROCEDURE — 63600175 PHARM REV CODE 636 W HCPCS

## 2024-03-11 PROCEDURE — 99232 SBSQ HOSP IP/OBS MODERATE 35: CPT | Mod: ,,, | Performed by: PSYCHIATRY & NEUROLOGY

## 2024-03-11 PROCEDURE — 99233 SBSQ HOSP IP/OBS HIGH 50: CPT | Mod: GC,,, | Performed by: INTERNAL MEDICINE

## 2024-03-11 PROCEDURE — 85025 COMPLETE CBC W/AUTO DIFF WBC: CPT | Mod: 91 | Performed by: INTERNAL MEDICINE

## 2024-03-11 PROCEDURE — S5010 5% DEXTROSE AND 0.45% SALINE: HCPCS

## 2024-03-11 PROCEDURE — 97535 SELF CARE MNGMENT TRAINING: CPT

## 2024-03-11 PROCEDURE — 85025 COMPLETE CBC W/AUTO DIFF WBC: CPT

## 2024-03-11 PROCEDURE — 20000000 HC ICU ROOM

## 2024-03-11 PROCEDURE — 94761 N-INVAS EAR/PLS OXIMETRY MLT: CPT

## 2024-03-11 PROCEDURE — 83735 ASSAY OF MAGNESIUM: CPT

## 2024-03-11 PROCEDURE — 84100 ASSAY OF PHOSPHORUS: CPT

## 2024-03-11 RX ORDER — SILODOSIN 4 MG/1
4 CAPSULE ORAL DAILY
Status: DISCONTINUED | OUTPATIENT
Start: 2024-03-12 | End: 2024-03-27 | Stop reason: HOSPADM

## 2024-03-11 RX ORDER — DEXTROSE MONOHYDRATE AND SODIUM CHLORIDE 5; .45 G/100ML; G/100ML
INJECTION, SOLUTION INTRAVENOUS CONTINUOUS
Status: ACTIVE | OUTPATIENT
Start: 2024-03-11 | End: 2024-03-12

## 2024-03-11 RX ORDER — AMOXICILLIN 250 MG
1 CAPSULE ORAL 2 TIMES DAILY
Status: DISCONTINUED | OUTPATIENT
Start: 2024-03-11 | End: 2024-03-15

## 2024-03-11 RX ORDER — ATORVASTATIN CALCIUM 40 MG/1
40 TABLET, FILM COATED ORAL DAILY
Status: DISCONTINUED | OUTPATIENT
Start: 2024-03-12 | End: 2024-03-27 | Stop reason: HOSPADM

## 2024-03-11 RX ORDER — POTASSIUM CHLORIDE 7.45 MG/ML
10 INJECTION INTRAVENOUS
Status: COMPLETED | OUTPATIENT
Start: 2024-03-11 | End: 2024-03-11

## 2024-03-11 RX ORDER — MAGNESIUM SULFATE HEPTAHYDRATE 40 MG/ML
2 INJECTION, SOLUTION INTRAVENOUS ONCE
Status: COMPLETED | OUTPATIENT
Start: 2024-03-11 | End: 2024-03-11

## 2024-03-11 RX ORDER — DEXTROSE MONOHYDRATE 50 MG/ML
INJECTION, SOLUTION INTRAVENOUS CONTINUOUS
Status: DISCONTINUED | OUTPATIENT
Start: 2024-03-11 | End: 2024-03-11

## 2024-03-11 RX ORDER — POLYETHYLENE GLYCOL 3350 17 G/17G
17 POWDER, FOR SOLUTION ORAL 2 TIMES DAILY
Status: DISCONTINUED | OUTPATIENT
Start: 2024-03-11 | End: 2024-03-15

## 2024-03-11 RX ADMIN — HEPARIN SODIUM 5000 UNITS: 5000 INJECTION INTRAVENOUS; SUBCUTANEOUS at 05:03

## 2024-03-11 RX ADMIN — POTASSIUM CHLORIDE 10 MEQ: 7.46 INJECTION, SOLUTION INTRAVENOUS at 06:03

## 2024-03-11 RX ADMIN — DEXTROSE AND SODIUM CHLORIDE: 5; 450 INJECTION, SOLUTION INTRAVENOUS at 03:03

## 2024-03-11 RX ADMIN — SENNOSIDES AND DOCUSATE SODIUM 1 TABLET: 8.6; 5 TABLET ORAL at 09:03

## 2024-03-11 RX ADMIN — DEXTROSE MONOHYDRATE 125 ML: 100 INJECTION, SOLUTION INTRAVENOUS at 01:03

## 2024-03-11 RX ADMIN — HEPARIN SODIUM 5000 UNITS: 5000 INJECTION INTRAVENOUS; SUBCUTANEOUS at 02:03

## 2024-03-11 RX ADMIN — POTASSIUM CHLORIDE 10 MEQ: 7.46 INJECTION, SOLUTION INTRAVENOUS at 08:03

## 2024-03-11 RX ADMIN — MICONAZOLE NITRATE 2 % TOPICAL POWDER: at 09:03

## 2024-03-11 RX ADMIN — MUPIROCIN: 20 OINTMENT TOPICAL at 09:03

## 2024-03-11 RX ADMIN — HEPARIN SODIUM 5000 UNITS: 5000 INJECTION INTRAVENOUS; SUBCUTANEOUS at 09:03

## 2024-03-11 RX ADMIN — ATORVASTATIN CALCIUM 40 MG: 40 TABLET, FILM COATED ORAL at 12:03

## 2024-03-11 RX ADMIN — POLYETHYLENE GLYCOL 3350 17 G: 17 POWDER, FOR SOLUTION ORAL at 09:03

## 2024-03-11 RX ADMIN — POTASSIUM CHLORIDE 10 MEQ: 7.46 INJECTION, SOLUTION INTRAVENOUS at 05:03

## 2024-03-11 RX ADMIN — MAGNESIUM SULFATE HEPTAHYDRATE 2 G: 40 INJECTION, SOLUTION INTRAVENOUS at 05:03

## 2024-03-11 RX ADMIN — DEXTROSE MONOHYDRATE 125 ML: 100 INJECTION, SOLUTION INTRAVENOUS at 05:03

## 2024-03-11 RX ADMIN — DEXTROSE MONOHYDRATE: 50 INJECTION, SOLUTION INTRAVENOUS at 02:03

## 2024-03-11 RX ADMIN — DEXTROSE MONOHYDRATE 125 ML: 100 INJECTION, SOLUTION INTRAVENOUS at 09:03

## 2024-03-11 RX ADMIN — DEXTROSE MONOHYDRATE 125 ML: 100 INJECTION, SOLUTION INTRAVENOUS at 08:03

## 2024-03-11 NOTE — PROGRESS NOTES
[facility] - Cardiac Medical ICU  Neurology  Progress Note    Patient Name: Bj Pate Jr.  MRN: [id number]  Admission Date: 3/4/2024  Hospital Length of Stay: 7 days  Code Status: Full Code   Attending Provider: Lewis Stone MD  Primary Care Physician: Jose Antonio Foster MD   Principal Problem:Hypothermia    HPI:   Neurology consulted for encephalopathy evaluation in Bj Pate Jr., a 74 y.o. male with history of stroke with residual right sided deficits, chronic indwelling bryant, diabetes, and hypertension admitted after initial presentation from SNF with altered mental status. Patient was hypothermic to 87.9, hypotensive w/ MAP 55, and hypoglycemic in the 50s. Hospital course notable for intubation, UTI, and AV block. MRI Brain done the morning after presentation showed chronic white matter changes and remote infarct. MRI CTL spine w/ L2-L3 chronic discitis and C3-C6 edema and post-contrast enhancement c/f chronic spondylodiscitis.          Subjective:     Interval History: Extubated yesterday. Follows commands. Disoriented.    Current Neurological Medications: none    Current Facility-Administered Medications   Medication Dose Route Frequency Provider Last Rate Last Admin    0.9%  NaCl infusion   Intravenous Continuous Aiden Reyes MD   Stopped at 03/07/24 1145    0.9%  NaCl infusion   Intravenous Continuous Aiden Reyes MD   Stopped at 03/11/24 0533    atorvastatin tablet 40 mg  40 mg Per OG tube Daily Abdirizak Carpenter,    40 mg at 03/10/24 0816    dextrose 10% bolus 125 mL 125 mL  12.5 g Intravenous PRN Lalita Miguel MD   Stopped at 03/11/24 0817    dextrose 10% bolus 250 mL 250 mL  25 g Intravenous PRN Lalita Miguel MD        glucagon (human recombinant) injection 1 mg  1 mg Intramuscular PRN Lalita Miguel MD        glucose chewable tablet 16 g  16 g Per OG tube PRN Abdirizak Carpenter DO        glucose chewable tablet 24 g  24 g Per OG tube PRN West, Kimble,  DO        haloperidol lactate injection 5 mg  5 mg Intravenous Q6H PRN Nitesh Ricks MD   5 mg at 03/10/24 1637    heparin (porcine) injection 5,000 Units  5,000 Units Subcutaneous Q8H [facility]   5,000 Units at 03/11/24 0518    insulin aspart U-100 pen 0-5 Units  0-5 Units Subcutaneous QID (AC + HS) PRN Lalita Miguel MD        miconazole NITRATE 2 % top powder   Topical (Top) BID Aiden Reyes MD   Given at 03/11/24 0904    mupirocin 2 % ointment   Nasal BID Aiden Reyes MD   Given at 03/11/24 0905    naloxone 0.4 mg/mL injection 0.02 mg  0.02 mg Intravenous PRN Lalita Miguel MD   0.4 mg at 03/04/24 1157    NORepinephrine 4 mg in dextrose 5% 250 mL infusion (premix)  0-3 mcg/kg/min Intravenous Continuous Pam Carter MD   Paused at 03/10/24 2059    polyethylene glycol packet 17 g  17 g Per NG tube BID Nitesh Ricks MD   17 g at 03/09/24 2104    senna-docusate 8.6-50 mg per tablet 1 tablet  1 tablet Per NG tube BID Nitesh Ricks MD   1 tablet at 03/09/24 2104    silodosin capsule 4 mg  4 mg Per OG tube Daily [facility]   4 mg at 03/10/24 0816    sodium chloride 0.9% flush 10 mL  10 mL Intravenous Q12H PRN Lalita Miguel MD           Review of Systems   Unable to perform ROS: Mental status change   Psychiatric/Behavioral:  Positive for agitation and confusion.      Objective:     Vital Signs (Most Recent):  Temp: 98.7 °F (37.1 °C) (03/11/24 0745)  Pulse: (!) 115 (03/11/24 0745)  Resp: (!) 24 (03/11/24 0745)  BP: (!) 155/59 (03/11/24 0251)  SpO2: 98 % (03/11/24 0745) Vital Signs (24h Range):  Temp:  [98.2 °F (36.8 °C)-98.7 °F (37.1 °C)] 98.7 °F (37.1 °C)  Pulse:  [] 115  Resp:  [16-37] 24  SpO2:  [94 %-100 %] 98 %  BP: ()/(50-71) 155/59  Arterial Line BP: (108-146)/(55-80) 139/70     Weight: 115 kg (253 lb 8.5 oz)  Body mass index is 37.44 kg/m².     Physical Exam  Vitals reviewed.   Constitutional:       General: He is not in acute distress.      Appearance: He is ill-appearing.   HENT:      Mouth/Throat:      Mouth: Mucous membranes are dry.   Eyes:      Extraocular Movements: Extraocular movements intact.      Pupils: Pupils are equal, round, and reactive to light.   Pulmonary:      Effort: Pulmonary effort is normal. No respiratory distress.   Neurological:      General: No focal deficit present.      Mental Status: He is alert. He is disoriented.      GCS: GCS eye subscore is 4. GCS verbal subscore is 4. GCS motor subscore is 6.      Cranial Nerves: Cranial nerves 2-12 are intact.          NEUROLOGICAL EXAMINATION:     MENTAL STATUS   Oriented to person.   Disoriented to place.   Disoriented to time.   Attention: decreased. Concentration: decreased.   Level of consciousness: alert ,  drowsy    CRANIAL NERVES   Cranial nerves II through XII intact.     CN III, IV, VI   Pupils are equal, round, and reactive to light.    MOTOR EXAM        BUE contractures. Follows commands. In restraints.     SENSORY EXAM   Light touch normal.     GAIT AND COORDINATION     Gait  Gait: (deferred)      Significant Labs: All pertinent lab results from the past 24 hours have been reviewed.    Significant Imaging: I have reviewed all pertinent imaging results/findings within the past 24 hours.  Assessment and Plan:     Acute encephalopathy  74 y.o. male with history of stroke with residual right sided deficits, chronic indwelling bryant, diabetes, and hypertension admitted after initial presentation from SNF with altered mental status. Patient was hypothermic to 87.9, hypotensive w/ MAP 55, and hypoglycemic in the 50s. Hospital course notable for intubation, UTI, and AV block. MRI Brain done the morning after presentation showed chronic white matter changes and remote infarct. MRI CTL spine w/ L2-L3 chronic discitis and C3-C6 edema and post-contrast enhancement c/f chronic spondylodiscitis. Patient extubated on 3/10. On 3/11 is following some commands but remains disoriented and  encephalopathic.    Suspect current AMS is related to the patient's multiple toxic/metabolic issues in someone with an already decreased cognitive reserve. Have received varying accounts regarding what the patient's true baseline has been since 11/2023.  Would consider spondylodiscitis as a possible contributor however primary less concerned regarding this.    Recommendations:  Continued treatment of patient's active medical issues   Continue delirium precautions.   If concern arises for possible discitis then please contact neurology for further recommendations.        VTE Risk Mitigation (From admission, onward)           Ordered     heparin (porcine) injection 5,000 Units  Every 8 hours         03/05/24 1100     IP VTE HIGH RISK PATIENT  Once         03/04/24 0316     Place sequential compression device  Until discontinued         03/04/24 0316                    Rolf Peace MD  Neurology  [facility] - Cardiac Medical ICU

## 2024-03-11 NOTE — SUBJECTIVE & OBJECTIVE
Interval History/Significant Events: NAEON. Remains unable to follow commands, responds to some orientation questions. Oriented to self, not place or time. Arterial line and Cordis to be removed today.    Review of Systems   Unable to perform ROS: Mental status change     Objective:     Vital Signs (Most Recent):  Temp: 98.7 °F (37.1 °C) (03/11/24 0745)  Pulse: 104 (03/11/24 1200)  Resp: 16 (03/11/24 1200)  BP: (!) 109/58 (03/11/24 1200)  SpO2: 99 % (03/11/24 1200) Vital Signs (24h Range):  Temp:  [98.2 °F (36.8 °C)-98.7 °F (37.1 °C)] 98.7 °F (37.1 °C)  Pulse:  [] 104  Resp:  [16-37] 16  SpO2:  [94 %-100 %] 99 %  BP: ()/(50-71) 109/58  Arterial Line BP: (108-154)/() 132/62   Weight: 115 kg (253 lb 8.5 oz)  Body mass index is 37.44 kg/m².      Intake/Output Summary (Last 24 hours) at 3/11/2024 1243  Last data filed at 3/11/2024 1200  Gross per 24 hour   Intake 1185.54 ml   Output 2105 ml   Net -919.46 ml          Physical Exam  Vitals reviewed.   Constitutional:       General: He is not in acute distress.     Appearance: He is not ill-appearing.   HENT:      Head: Normocephalic and atraumatic.      Mouth/Throat:      Mouth: Mucous membranes are moist.      Pharynx: Oropharynx is clear.      Comments: Cordis in place  Eyes:      Pupils: Pupils are equal, round, and reactive to light.   Cardiovascular:      Rate and Rhythm: Normal rate and regular rhythm.   Pulmonary:      Effort: Pulmonary effort is normal.      Breath sounds: Normal breath sounds.   Abdominal:      General: Bowel sounds are normal.      Palpations: Abdomen is soft.      Tenderness: There is no abdominal tenderness.   Musculoskeletal:      Right lower leg: No edema.      Left lower leg: No edema.   Skin:     General: Skin is warm.   Neurological:      Mental Status: He is alert. He is disoriented.            Vents:  Vent Mode: Spont (03/10/24 1207)  Set Rate: 12 BPM (03/10/24 0740)  Vt Set: 480 mL (03/10/24 0740)  Pressure Support: 5  cmH20 (03/10/24 1207)  PEEP/CPAP: 5 cmH20 (03/10/24 1207)  Oxygen Concentration (%): 30 (03/10/24 1405)  Peak Airway Pressure: 11 cmH20 (03/10/24 1207)  Plateau Pressure: 8.2 cmH20 (03/10/24 1207)  Total Ve: 8.82 L/m (03/10/24 1207)  Negative Inspiratory Force (cm H2O): 0 (03/10/24 1405)  F/VT Ratio<105 (RSBI): (!) 58.05 (03/10/24 1207)  Lines/Drains/Airways       Central Venous Catheter Line  Duration             Introducer 03/06/24 0454 Internal Jugular Right 5 days              Drain  Duration                  Urethral Catheter 03/04/24 0249 Silicone 16 Fr. 7 days              Arterial Line  Duration             Arterial Line 03/06/24 0258 Left Radial 5 days              Peripheral Intravenous Line  Duration                  Peripheral IV - Single Lumen 20 G Anterior;Distal;Left Upper Arm -- days         Peripheral IV - Single Lumen 03/04/24 18 G Right;Posterior Wrist 7 days                  Significant Labs:    CBC/Anemia Profile:  Recent Labs   Lab 03/10/24  0355 03/11/24  0353   WBC 9.23 7.91   HGB 7.4* 7.0*   HCT 22.7* 21.7*   PLT 98* 143*   MCV 84 84   RDW 17.0* 16.9*        Chemistries:  Recent Labs   Lab 03/10/24  0355 03/11/24  0353   * 134*   K 3.7 3.7   CL 98 101   CO2 24 26   BUN 16 16   CREATININE 1.3 1.3   CALCIUM 9.3 9.4   ALBUMIN 1.4* 1.5*   PROT 6.4 6.3   BILITOT 0.5 0.5   ALKPHOS 109 92   ALT 8* 11   AST 14 20   MG 1.8 1.7   PHOS 3.2 2.9       All pertinent labs within the past 24 hours have been reviewed.    Significant Imaging:  I have reviewed all pertinent imaging results/findings within the past 24 hours.

## 2024-03-11 NOTE — HPI
Neurology consulted for encephalopathy evaluation in Bj Pate Jr., a 74 y.o. male with history of stroke with residual right sided deficits, chronic indwelling bryant, diabetes, and hypertension admitted after initial presentation from SNF with altered mental status. Patient was hypothermic to 87.9, hypotensive w/ MAP 55, and hypoglycemic in the 50s. Hospital course notable for intubation, UTI, and AV block. MRI Brain done the morning after presentation showed chronic white matter changes and remote infarct. MRI CTL spine w/ L2-L3 chronic discitis and C3-C6 edema and post-contrast enhancement c/f chronic spondylodiscitis.

## 2024-03-11 NOTE — SUBJECTIVE & OBJECTIVE
Past Medical History:   Diagnosis Date    Acute on chronic systolic heart failure 12/13/2023    Anemia 06/04/2021    Anticoagulant long-term use     Basal ganglia hemorrhage     Left basal ganglia hemorrhage with resultant right-sided hemiparesis which has resolved.     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        Past Surgical History:   Procedure Laterality Date    CARPAL TUNNEL RELEASE Left 2/19/2020    Procedure: RELEASE, CARPAL TUNNEL LEFT;  Surgeon: Maria Luisa Mccurdy MD;  Location: [facility] OR;  Service: Orthopedics;  Laterality: Left;    COLONOSCOPY N/A 7/28/2023    Procedure: COLONOSCOPY;  Surgeon: Jaylene Alvarado MD;  Location: [facility] ENDO;  Service: Endoscopy;  Laterality: N/A;    EPIDURAL STEROID INJECTION N/A 5/2/2019    Procedure: Injection, Steroid, Epidural Cervical;  Surgeon: Dariel Doan Jr., MD;  Location: [facility] ENDO;  Service: Pain Management;  Laterality: N/A;  Cervical Epidural Steroid Injection     [id number]    Arrive @ 1130; ASA; Check BG    EPIDURAL STEROID INJECTION Bilateral 5/29/2019    Procedure: Lumbar Medial Branch Blocks;  Surgeon: Dariel Doan Jr., MD;  Location: [facility] ENDO;  Service: Pain Management;  Laterality: Bilateral;  Bilateral Lumbar Medial Branch Blocks L3, L4, L5    [id number]  20960    Attive @ 1030 (11 arrival request); NO Sedation; ASA; Check BG    EPIDURAL STEROID INJECTION Bilateral 7/3/2019    Procedure: Lumbar  Medial Branch Blocks;  Surgeon: Dariel Doan Jr., MD;  Location: [facility] ENDO;  Service: Pain Management;  Laterality: Bilateral;  Bilateral Lumbar Medial Branch Blocks L3, L4, L5    13069  [id number]    Arrive @ 0930; NO Sedation; ASA; Check BG    EPIDURAL STEROID INJECTION N/A 8/7/2019    Procedure: Injection, Steroid, Epidural Cervical;  Surgeon: Dariel Doan Jr., MD;  Location: [facility] ENDO;  Service: Pain Management;  Laterality: N/A;  Cervical Epidural Steroid Injection C7-T1    88684    Arrive @ 1115; Last ASA 7/30; Check BG    ESOPHAGOGASTRODUODENOSCOPY N/A 7/28/2023    Procedure: EGD (ESOPHAGOGASTRODUODENOSCOPY);  Surgeon: Jaylene Alvarado MD;  Location: [facility] ENDO;  Service: Endoscopy;  Laterality: N/A;  inst via portal    FUSION, SPINE, LUMBAR, TLIF, POSTERIOR APPROACH, USING PEDICLE SCREW N/A 12/15/2023    Procedure: L-1 TO L-4 FUSION, SPINE, LUMBAR, TLIF, POSTERIOR APPROACH, USING PEDICLE SCREW;  Surgeon: Rolf Rincon MD;  Location: [facility] OR [address] [place] [address];  Service: Neurosurgery;  Laterality: N/A;    LEFT HEART CATHETERIZATION Left 9/21/2021    Procedure: Left heart cath 9am start, R rad access;  Surgeon: Dariel Conner MD;  Location: [facility] CATH LAB;  Service: Cardiology;  Laterality: Left;  RN PRE OP Covid NEGATIVE ON  9-20-21.  C A    MIDLINE CATHETER PLACEMENT  12/12/2023    NO PAST SURGERIES         Review of patient's allergies indicates:   Allergen Reactions    Tomato (solanum lycopersicum) Hives    Naproxen Hives    Shrimp Other (See Comments)       No current facility-administered medications on file prior to encounter.     Current Outpatient Medications on File Prior to Encounter   Medication Sig    atorvastatin (LIPITOR) 40 MG tablet Take 1 tablet (40 mg total) by mouth once daily.    pantoprazole (PROTONIX) 40 MG tablet Take 40 mg by mouth once daily.    QUEtiapine (SEROQUEL) 25 MG Tab Take 25 mg by mouth 2 (two) times daily.    tamsulosin (FLOMAX) 0.4 mg Cap Take 0.4 mg by mouth once  daily.    aspirin (ECOTRIN) 81 MG EC tablet Take 81 mg by mouth once daily.    blood sugar diagnostic Strp To check BG 2 times daily, to use with insurance preferred meter    colchicine (COLCRYS) 0.6 mg tablet Take 1 tablet (0.6 mg total) by mouth every Mon, Wed, Fri.    fluticasone-salmeterol diskus inhaler 500-50 mcg Inhale 1 puff into the lungs 2 (two) times daily. Controller    furosemide (LASIX) 80 MG tablet Take 1 tablet (80 mg total) by mouth once daily.    hydroCHLOROthiazide (HYDRODIURIL) 25 MG tablet Take 25 mg by mouth once daily.    Lactobacillus rhamnosus GG (CULTURELLE) 10 billion cell capsule 1 capsule by Per G Tube route once daily.    lancets Misc To check BG 2 times daily, to use with insurance preferred meter    losartan (COZAAR) 25 MG tablet Take 1 tablet (25 mg total) by mouth once daily.    psyllium husk, aspartame, (METAMUCIL) 3.4 gram PwPk packet 1 packet by Per NG tube route 2 (two) times daily.    [DISCONTINUED] nystatin (MYCOSTATIN) 100,000 unit/mL suspension Take 6 mLs (600,000 Units total) by mouth 4 (four) times daily. for 10 days    [DISCONTINUED] potassium bicarbonate (K-LYTE) disintegrating tablet 1 tablet (25 mEq total) by Per G Tube route once daily. Stop 1/4/2024. May take orally if NG tube not available     Family History       Problem Relation (Age of Onset)    Diabetes Paternal Grandfather    Heart disease Paternal Grandfather    Hypertension     No Known Problems Mother, Father, Sister, Brother, Maternal Aunt, Maternal Uncle, Paternal Aunt, Paternal Uncle, Maternal Grandmother, Maternal Grandfather, Paternal Grandmother          Tobacco Use    Smoking status: Never    Smokeless tobacco: Never   Substance and Sexual Activity    Alcohol use: Yes     Alcohol/week: 0.0 standard drinks of alcohol     Comment: occacionally    Drug use: No    Sexual activity: Not Currently     Review of Systems   Unable to perform ROS: Other     Objective:     Vital Signs (Most Recent):  Temp: 98.3  °F (36.8 °C) (03/10/24 1505)  Pulse: 110 (03/10/24 1805)  Resp: (!) 27 (03/10/24 1805)  BP: (!) 93/55 (03/09/24 2335)  SpO2: 96 % (03/10/24 1805) Vital Signs (24h Range):  Temp:  [98.2 °F (36.8 °C)-98.8 °F (37.1 °C)] 98.3 °F (36.8 °C)  Pulse:  [] 110  Resp:  [6-42] 27  SpO2:  [94 %-100 %] 96 %  BP: (93)/(55) 93/55  Arterial Line BP: (110-157)/(48-70) 130/62     Weight: 114 kg (251 lb 5.2 oz)  Body mass index is 37.11 kg/m².     Physical Exam  Vitals and nursing note reviewed.   Constitutional:       Interventions: He is intubated and restrained.   Pulmonary:      Effort: He is intubated.          NEUROLOGICAL EXAMINATION:     MENTAL STATUS   Follows 1 step (Intermittent) commands.   Level of consciousness: drowsy    CRANIAL NERVES        Closes eyes, wrinkles nose     MOTOR EXAM        Moves 4 extremities spontaneously and to command     SENSORY EXAM        Moves all 4 extremities to light touch       Significant Labs:   Recent Lab Results  (Last 5 results in the past 24 hours)        03/10/24  1644   03/10/24  1146   03/10/24  0839   03/10/24  0606   03/10/24  0355        Albumin         1.4       ALP         109       Allens Test       N/A         ALT         8       Anion Gap         10       AST         14       Baso #         0.01       Basophil %         0.1       BILIRUBIN TOTAL         0.5  Comment: For infants and newborns, interpretation of results should be based  on gestational age, weight and in agreement with clinical  observations.    Premature Infant recommended reference ranges:  Up to 24 hours.............<8.0 mg/dL  Up to 48 hours............<12.0 mg/dL  3-5 days..................<15.0 mg/dL  6-29 days.................<15.0 mg/dL         Site       Nelly/UAC         BUN         16       Calcium         9.3       Chloride         98       CO2         24       Creatinine         1.3       Differential Method         Automated       eGFR         58.4       Eos #         0.1       Eos %          1.1       Glucose         110       Gran # (ANC)         6.9       Gran %         75.1       Hematocrit         22.7       Hemoglobin         7.4       Immature Grans (Abs)         0.10  Comment: Mild elevation in immature granulocytes is non specific and   can be seen in a variety of conditions including stress response,   acute inflammation, trauma and pregnancy. Correlation with other   laboratory and clinical findings is essential.         Immature Granulocytes         1.1       Lymph #         0.7       Lymph %         7.6       Magnesium          1.8       MCH         27.2       MCHC         32.6       MCV         84       Mono #         1.4       Mono %         15.0       MPV         12.3       nRBC         0       Phosphorus Level         3.2       Platelet Count         98       POC BE       -3         POC HCO3       22.3         POC PCO2       40.8         POC PH       7.345         POC PO2       119         POC SATURATED O2       98         POC TCO2       24         POCT Glucose 116   111   132           Potassium         3.7       PROTEIN TOTAL         6.4       RBC         2.72       RDW         17.0       Sample       ARTERIAL         Sodium         132       WBC         9.23                            All pertinent lab results from the past 24 hours have been reviewed.    Significant Imaging:   MRI Spine Cervical-Thoracic-Lumbar W W/O Contrast (XPD) 03/09/2024    Addendum 3/10/2024  8:06 AM  The bilateral psoas musculature demonstrate no definite fluid collection within.      Electronically signed by: Laure Reynoso MD  Date:    03/10/2024  Time:    08:03    Narrative  EXAMINATION:  MRI SPINE CERVICAL-THORACIC-LUMBAR W W/O CONTRAST (XPD)    CLINICAL HISTORY:  concern for infectious process;    TECHNIQUE:  Multiplanar multi sequences images of the cervical, thoracic and lumbar spine before and after the administration of 10 cc of IV contrast.    COMPARISON:  MRI cervicothoracic and lumbar spine  12/25/2023    FINDINGS: SEVERE MOTION ARTIFACTS  MRI cervical spine: The alignment of the cervical spine is normal.  The vertebral body heights are well maintained.  Severe disc space narrowing C3-4 through C7-T1.  Nonspecific diffuse mild marrow edema C3-C4 C5 vertebrae and upper endplate of C6.  No fluid within the intervertebral disc space.  Mild postcontrast anterior epidural enhancement from C1 through C6, could be inflammatory/infectious or degenerative.    There is posterior disc osteophyte complex and ligamentum flavum hypertrophy at all level of the cervical spine.  No evidence of myelomalacia.  Fluid within the oropharynx. The remainder of the paravertebral soft tissues appear normal.    C2-C3: Left facet joint osseous hypertrophy, mild left foraminal narrowing.    C3-C4: Posterior disc osteophyte complex, bilateral uncovertebral spur, mild canal stenosis.  Moderate left and mild right foraminal narrowing.    C4-C5: Posterior disc osteophyte complex, left uncovertebral spur, mild canal stenosis.  Severe left foraminal narrowing.    C5-C6: Mild canal stenosis.  Mild bilateral foraminal narrowing.    C6-C7: Posterior disc osteophyte complex, bilateral uncovertebral spur, moderate canal stenosis, moderate bilateral foraminal narrowing.    C7-T1: Posterior disc osteophyte complex, right uncovertebral spur, no canal stenosis.  Severe right foraminal narrowing.    MRI thoracic spine: The alignment of the thoracic spine is normal.  The vertebral body heights are well maintained.  No fracture, no osseous lesions.  Increased signal within the T10-11 disc space and T12-L1 disc space, unchanged from the prior study.  No strong evidence for discitis or osteomyelitis.  Bilateral pleural effusion, atelectasis of the right lower lobe and subsegmental atelectasis at the dependent portion of the right upper lobe.  No myelomalacia.    Posterior disc osteophyte complex T4-5 through T11-12 worse at T6-7.  There is mild canal  stenosis at T6-7.  Moderate right foraminal narrowing at T2-3:.  Moderate bilateral foraminal narrowing at the right T4-5 foramina.  Severe left foraminal narrowing at T5-6.  Severe bilateral foraminal narrowing at T6-T7 and T7-T8.  Severe bilateral foraminal narrowing at T9-10.  Post-contrast enhancement demonstrate no definite abnormal enhancement of the spinal canal and its content.    MRI lumbar spine: Postoperative changes of posterior instrumented fusion L1 through L4.  Intervertebral disc spacer L2-3.  Laminectomy L1 through L4.  The alignment of the lumbar spine is within normal limits.  The vertebral body height are well maintained.  No acute fracture, no osseous lesion.  The conus terminates at T12.    T12-L1: Unremarkable    L1-L2: No canal stenosis.  There is mild left foraminal narrowing.    L2-L3: Artifact from the hardware.  No apparent canal stenosis or foraminal narrowing.    L3-L4: Diffuse disc bulge, unchanged no apparent canal stenosis or significant foraminal narrowing.    L4-5: Diffuse disc bulge and severe facet joint osseous hypertrophy, there is severe canal stenosis, severe left and moderate right foraminal narrowing.    L5-S1: Right lateral bridging osteophyte.  No canal stenosis.  There is mild right foraminal narrowing.  No abnormal fluid collection in the surgical bed region.  There is dependent subcutaneous soft tissue edema.    The upper sacrum and upper sacroiliac joints appear normal.  The bilateral psoas musculature demonstrate no fluid collection within.  Paraspinal muscle atrophy.    Post-contrast images demonstrate enhancement at the left L2-L3 intervertebral disc region, enhancement in the soft tissue of the posterior lumbar surgical bed region, enhancement left L4-L5 facet joint mild, enhancement of the C3-C4 C5 vertebrae and associated osteophytes.    Impression  Left L2-L3 intervertebral disc space enhancement slightly more striking compared to the prior exam, could  represent chronic discitis or granulomatous tissue.    Stable edema and mild postcontrast enhancement C3 through C5-6, could represent chronic spondylo discitis, cannot be compared to the prior study where there was significant motion artifacts.  Minimal dural enhancement at this level could be reactive or ongoing chronic infection.    Left facet joint osseous hypertrophy and postcontrast enhancement L4-5 similar to prior study continue follow-up advised.      Electronically signed by: Laure Reynoso MD  Date:    03/09/2024  Time:    14:38    I have reviewed all pertinent imaging results/findings within the past 24 hours.

## 2024-03-11 NOTE — ASSESSMENT & PLAN NOTE
Patient brought from nursing facility d/t altered mentation, more lethargic than normal. Suspect encephalopathy is due to acute infection. Intubated for airway protection. Extubated to room air 3/10. Neurology consulted given persistent encephalopathy despite treatment of UTI.     -f/u neurology recs  - delirium precautions in place

## 2024-03-11 NOTE — PROGRESS NOTES
[facility] - Cardiac Medical ICU  Critical Care Medicine  Progress Note    Patient Name: Bj Pate Jr.  MRN: [id number]  Admission Date: 3/4/2024  Hospital Length of Stay: 7 days  Code Status: Full Code  Attending Provider: Lewis Stone MD  Primary Care Provider: Jose Antonio Foster MD   Principal Problem: Hypothermia    Subjective:     HPI:  73M with PMH CVA with R sided deficits, DM, HTN who presents to the ED from SNF for AMS. Per chart, he was more somnolent than usual over the last 2 days. At baseline he is verbal and follows commands but does not ambulate. On arrival to ED he was hypothermic to 87.9F and found to be hypoglycemic to the 50s. Intermittently hypotensive to MAP 55. Patient is awake but does not interact meaningfully on exam. Chronic Rivera was initially draining cloudy urine with sediment, Rivera exchanged. Initial labs without leukocytosis, CMP unremarkable, VBG with pH 7.49/30.3/38.9, lactate normal. Received 1L NS bolus through warming pump. Initially admitted to , critical care consulted for severe hypothermia with hypotension.     Hospital/ICU Course:  Patient intubated 3/4 for airway protection. Sedated and requiring Levophed. Urine with Citrobacter, abx narrowed accordingly. Developed AV block, EP consulted and cordis CVC placed. TVP deemed unnecessary given no evidence of 3rd degree heart block. Extubated to room air 3/10.     Interval History/Significant Events: NAEON. Remains unable to follow commands, responds to some orientation questions. Oriented to self, not place or time. Arterial line and Cordis to be removed today.    Review of Systems   Unable to perform ROS: Mental status change     Objective:     Vital Signs (Most Recent):  Temp: 98.7 °F (37.1 °C) (03/11/24 0745)  Pulse: 104 (03/11/24 1200)  Resp: 16 (03/11/24 1200)  BP: (!) 109/58 (03/11/24 1200)  SpO2: 99 % (03/11/24 1200) Vital Signs (24h Range):  Temp:  [98.2 °F (36.8 °C)-98.7 °F (37.1 °C)] 98.7 °F (37.1  °C)  Pulse:  [] 104  Resp:  [16-37] 16  SpO2:  [94 %-100 %] 99 %  BP: ()/(50-71) 109/58  Arterial Line BP: (108-154)/() 132/62   Weight: 115 kg (253 lb 8.5 oz)  Body mass index is 37.44 kg/m².      Intake/Output Summary (Last 24 hours) at 3/11/2024 1243  Last data filed at 3/11/2024 1200  Gross per 24 hour   Intake 1185.54 ml   Output 2105 ml   Net -919.46 ml          Physical Exam  Vitals reviewed.   Constitutional:       General: He is not in acute distress.     Appearance: He is not ill-appearing.   HENT:      Head: Normocephalic and atraumatic.      Mouth/Throat:      Mouth: Mucous membranes are moist.      Pharynx: Oropharynx is clear.      Comments: Cordis in place  Eyes:      Pupils: Pupils are equal, round, and reactive to light.   Cardiovascular:      Rate and Rhythm: Normal rate and regular rhythm.   Pulmonary:      Effort: Pulmonary effort is normal.      Breath sounds: Normal breath sounds.   Abdominal:      General: Bowel sounds are normal.      Palpations: Abdomen is soft.      Tenderness: There is no abdominal tenderness.   Musculoskeletal:      Right lower leg: No edema.      Left lower leg: No edema.   Skin:     General: Skin is warm.   Neurological:      Mental Status: He is alert. He is disoriented.            Vents:  Vent Mode: Spont (03/10/24 1207)  Set Rate: 12 BPM (03/10/24 0740)  Vt Set: 480 mL (03/10/24 0740)  Pressure Support: 5 cmH20 (03/10/24 1207)  PEEP/CPAP: 5 cmH20 (03/10/24 1207)  Oxygen Concentration (%): 30 (03/10/24 1405)  Peak Airway Pressure: 11 cmH20 (03/10/24 1207)  Plateau Pressure: 8.2 cmH20 (03/10/24 1207)  Total Ve: 8.82 L/m (03/10/24 1207)  Negative Inspiratory Force (cm H2O): 0 (03/10/24 1405)  F/VT Ratio<105 (RSBI): (!) 58.05 (03/10/24 1207)  Lines/Drains/Airways       Central Venous Catheter Line  Duration             Introducer 03/06/24 0454 Internal Jugular Right 5 days              Drain  Duration                  Urethral Catheter 03/04/24 0244  Silicone 16 Fr. 7 days              Arterial Line  Duration             Arterial Line 03/06/24 0258 Left Radial 5 days              Peripheral Intravenous Line  Duration                  Peripheral IV - Single Lumen 20 G Anterior;Distal;Left Upper Arm -- days         Peripheral IV - Single Lumen 03/04/24 18 G Right;Posterior Wrist 7 days                  Significant Labs:    CBC/Anemia Profile:  Recent Labs   Lab 03/10/24  0355 03/11/24  0353   WBC 9.23 7.91   HGB 7.4* 7.0*   HCT 22.7* 21.7*   PLT 98* 143*   MCV 84 84   RDW 17.0* 16.9*        Chemistries:  Recent Labs   Lab 03/10/24  0355 03/11/24  0353   * 134*   K 3.7 3.7   CL 98 101   CO2 24 26   BUN 16 16   CREATININE 1.3 1.3   CALCIUM 9.3 9.4   ALBUMIN 1.4* 1.5*   PROT 6.4 6.3   BILITOT 0.5 0.5   ALKPHOS 109 92   ALT 8* 11   AST 14 20   MG 1.8 1.7   PHOS 3.2 2.9       All pertinent labs within the past 24 hours have been reviewed.    Significant Imaging:  I have reviewed all pertinent imaging results/findings within the past 24 hours.    ABG  Recent Labs   Lab 03/10/24  0606   PH 7.345*   PO2 119*   PCO2 40.8   HCO3 22.3*   BE -3*     Assessment/Plan:     Neuro  Acute encephalopathy  Patient brought from nursing facility d/t altered mentation, more lethargic than normal. Suspect encephalopathy is due to acute infection. Intubated for airway protection. Extubated to room air 3/10. Neurology consulted given persistent encephalopathy despite treatment of UTI.     -f/u neurology recs  - delirium precautions in place    History of CVA (cerebrovascular accident)  Verbal and follows command at baseline, was not doing upon admission and for 2 days prior. Has right-sided deficits.       Patient intubated for airway protection and being unable to follow commands   MRI unrevealing   EEG monitoring ordered     Cardiac/Vascular  Complete heart block  Concern for complete heart block overnight 3/6. EP consulted. Cordis and A line in place. Following >48hrs w/o evidence  "of complete heart block, EP advised that Cordis may be removed if desired as patient will not require pacing.    Plan:  - A line, cordis in place   - plan to remove on 3/11 as no longer required for pacing    ID  Sepsis  This patient does have evidence of infective focus  My overall impression is sepsis.  Source: Urinary Tract  Antibiotics given-   Antibiotics (72h ago, onward)      Start     Stop Route Frequency Ordered    03/07/24 1230  mupirocin 2 % ointment         03/12/24 0859 Nasl 2 times daily 03/07/24 1120          Latest lactate reviewed-  No results for input(s): "LACTATE", "POCLAC" in the last 72 hours.    Organ dysfunction indicated by Encephalopathy    Fluid challenge Ideal Body Weight- The patient's ideal body weight is Ideal body weight: 70.7 kg (155 lb 13.8 oz) which will be used to calculate fluid bolus of 30 ml/kg for treatment of septic shock.      Post- resuscitation assessment No - Post resuscitation assessment not needed       Will Not start Pressors- Levophed for MAP of 65  Source control achieved by: IV abx    74M with hx CVA, osteomyelitis s/p neurosurgical intervention presents to [facility] from SNF for AMS x 2 days. Found to be hypothermic and hypoglycemic with intermittent readings of hypotension to MAP 55. Lactic acid 1. Suspect urinary source given cloudy urine draining from Rivera on presentation. Patient encephalopathic and intubated for airway protection. S/p 1L warmed IVF, started on Vanc/Zosyn. Urine with citrobacter sensitive to Zosyn. Blood cx w/NG.     Patient failing to improve despite adequate treatment of UTI. Imaging spine given recent spinal fusion and psoas abscess; MRI shows no concern for active infection in psoas or spine. Neurology consulted for further assistance.     -continue Zosyn  -wean Levo as able. Off since 3/10  -nikki hugger for hypothermia      Endocrine  Hypoglycemia  Suspect hypoglycemia may be due to infection. See 'sepsis'    Other  * Hypothermia  Pt's body " temp was 87 degrees Farenheit on admission. Pt was shivering, hypoglycemia noted. S/p 1L warmed IVF, nikki hugger in place. Patient now normothermic, continuing to monitor.       Critical Care Daily Checklist:    A: Awake: RASS Goal/Actual Goal: RASS Goal: 0-->alert and calm  Actual:     B: Spontaneous Breathing Trial Performed? Spon. Breathing Trial Initiated?: Initiated (03/09/24 0858)   C: SAT & SBT Coordinated?  N/a                      D: Delirium: CAM-ICU Overall CAM-ICU: Positive   E: Early Mobility Performed? Yes   F: Feeding Goal: Goals: Meet % of EEN/EPN by RD f/u date  Status: Nutrition Goal Status: goal not met   Current Diet Order   Procedures    Diet NPO      AS: Analgesia/Sedation none   T: Thromboembolic Prophylaxis heparin   H: HOB > 300 Yes   U: Stress Ulcer Prophylaxis (if needed) none   G: Glucose Control controlled   B: Bowel Function Stool Occurrence: 1   I: Indwelling Catheter (Lines & Rivera) Necessity Cordis, A-line   D: De-escalation of Antimicrobials/Pharmacotherapies Removal of cordis and arterial line    Plan for the day/ETD PT/OT, follow up neurology recs    Code Status:  Family/Goals of Care: Full Code  Family updated at bedside       Critical secondary to Patient has a condition that poses threat to life and bodily function: encephalopathy      Critical care was time spent personally by me on the following activities: development of treatment plan with patient or surrogate and bedside caregivers, discussions with consultants, evaluation of patient's response to treatment, examination of patient, ordering and performing treatments and interventions, ordering and review of laboratory studies, ordering and review of radiographic studies, pulse oximetry, re-evaluation of patient's condition. This critical care time did not overlap with that of any other provider or involve time for any procedures.     Shelbie Souza MD  Critical Care Medicine  [facility] - Cardiac Medical ICU

## 2024-03-11 NOTE — SUBJECTIVE & OBJECTIVE
Subjective:     Interval History: Extubated yesterday. Follows commands. Disoriented.    Current Neurological Medications: none    Current Facility-Administered Medications   Medication Dose Route Frequency Provider Last Rate Last Admin    0.9%  NaCl infusion   Intravenous Continuous Aiden Reyes MD   Stopped at 03/07/24 1145    0.9%  NaCl infusion   Intravenous Continuous Aiden Reyes MD   Stopped at 03/11/24 0533    atorvastatin tablet 40 mg  40 mg Per OG tube Daily [facility],    40 mg at 03/10/24 0816    dextrose 10% bolus 125 mL 125 mL  12.5 g Intravenous PRN Lalita Miguel MD   Stopped at 03/11/24 0817    dextrose 10% bolus 250 mL 250 mL  25 g Intravenous PRN Lalita Miguel MD        glucagon (human recombinant) injection 1 mg  1 mg Intramuscular PRN Lalita Miguel MD        glucose chewable tablet 16 g  16 g Per OG tube PRN Novi Halifax,         glucose chewable tablet 24 g  24 g Per OG tube PRN Novi Halifax,         haloperidol lactate injection 5 mg  5 mg Intravenous Q6H PRN Nitesh Ricks MD   5 mg at 03/10/24 1637    heparin (porcine) injection 5,000 Units  5,000 Units Subcutaneous Q8H [facility],    5,000 Units at 03/11/24 0518    insulin aspart U-100 pen 0-5 Units  0-5 Units Subcutaneous QID (AC + HS) PRN Lalita Miguel MD        miconazole NITRATE 2 % top powder   Topical (Top) BID Aiden Reyes MD   Given at 03/11/24 0904    mupirocin 2 % ointment   Nasal BID Aiden Reyes MD   Given at 03/11/24 0905    naloxone 0.4 mg/mL injection 0.02 mg  0.02 mg Intravenous PRN Lalita Miguel MD   0.4 mg at 03/04/24 1157    NORepinephrine 4 mg in dextrose 5% 250 mL infusion (premix)  0-3 mcg/kg/min Intravenous Continuous Pam Carter MD   Paused at 03/10/24 2059    polyethylene glycol packet 17 g  17 g Per NG tube BID Nitesh Ricks MD   17 g at 03/09/24 2104    senna-docusate 8.6-50 mg per tablet 1 tablet  1 tablet Per NG  tube BID Nitesh Ricks MD   1 tablet at 03/09/24 2104    silodosin capsule 4 mg  4 mg Per OG tube Daily Abdirizak Carpenter DO   4 mg at 03/10/24 0816    sodium chloride 0.9% flush 10 mL  10 mL Intravenous Q12H Lalita Mai MD           Review of Systems   Unable to perform ROS: Mental status change   Psychiatric/Behavioral:  Positive for agitation and confusion.      Objective:     Vital Signs (Most Recent):  Temp: 98.7 °F (37.1 °C) (03/11/24 0745)  Pulse: (!) 115 (03/11/24 0745)  Resp: (!) 24 (03/11/24 0745)  BP: (!) 155/59 (03/11/24 0251)  SpO2: 98 % (03/11/24 0745) Vital Signs (24h Range):  Temp:  [98.2 °F (36.8 °C)-98.7 °F (37.1 °C)] 98.7 °F (37.1 °C)  Pulse:  [] 115  Resp:  [16-37] 24  SpO2:  [94 %-100 %] 98 %  BP: ()/(50-71) 155/59  Arterial Line BP: (108-146)/(55-80) 139/70     Weight: 115 kg (253 lb 8.5 oz)  Body mass index is 37.44 kg/m².     Physical Exam  Vitals reviewed.   Constitutional:       General: He is not in acute distress.     Appearance: He is ill-appearing.   HENT:      Mouth/Throat:      Mouth: Mucous membranes are dry.   Eyes:      Extraocular Movements: Extraocular movements intact.      Pupils: Pupils are equal, round, and reactive to light.   Pulmonary:      Effort: Pulmonary effort is normal. No respiratory distress.   Neurological:      General: No focal deficit present.      Mental Status: He is alert. He is disoriented.      GCS: GCS eye subscore is 4. GCS verbal subscore is 4. GCS motor subscore is 6.      Cranial Nerves: Cranial nerves 2-12 are intact.          NEUROLOGICAL EXAMINATION:     MENTAL STATUS   Oriented to person.   Disoriented to place.   Disoriented to time.   Attention: decreased. Concentration: decreased.   Level of consciousness: alert ,  drowsy    CRANIAL NERVES   Cranial nerves II through XII intact.     CN III, IV, VI   Pupils are equal, round, and reactive to light.    MOTOR EXAM        BUE contractures. Follows commands. In restraints.      SENSORY EXAM   Light touch normal.     GAIT AND COORDINATION     Gait  Gait: (deferred)      Significant Labs: All pertinent lab results from the past 24 hours have been reviewed.    Significant Imaging: I have reviewed all pertinent imaging results/findings within the past 24 hours.

## 2024-03-11 NOTE — ASSESSMENT & PLAN NOTE
Concern for complete heart block overnight 3/6. EP consulted. Cordis and A line in place. Following >48hrs w/o evidence of complete heart block, EP advised that Cordis may be removed if desired as patient will not require pacing.    Plan:  - A line, cordis in place   - plan to remove on 3/11 as no longer required for pacing

## 2024-03-11 NOTE — ASSESSMENT & PLAN NOTE
"This patient does have evidence of infective focus  My overall impression is sepsis.  Source: Urinary Tract  Antibiotics given-   Antibiotics (72h ago, onward)      Start     Stop Route Frequency Ordered    03/07/24 1230  mupirocin 2 % ointment         03/12/24 0859 Nasl 2 times daily 03/07/24 1120          Latest lactate reviewed-  No results for input(s): "LACTATE", "POCLAC" in the last 72 hours.    Organ dysfunction indicated by Encephalopathy    Fluid challenge Ideal Body Weight- The patient's ideal body weight is Ideal body weight: 70.7 kg (155 lb 13.8 oz) which will be used to calculate fluid bolus of 30 ml/kg for treatment of septic shock.      Post- resuscitation assessment No - Post resuscitation assessment not needed       Will Not start Pressors- Levophed for MAP of 65  Source control achieved by: IV abx    74M with hx CVA, osteomyelitis s/p neurosurgical intervention presents to [facility] from SNF for AMS x 2 days. Found to be hypothermic and hypoglycemic with intermittent readings of hypotension to MAP 55. Lactic acid 1. Suspect urinary source given cloudy urine draining from Rivera on presentation. Patient encephalopathic and intubated for airway protection. S/p 1L warmed IVF, started on Vanc/Zosyn. Urine with citrobacter sensitive to Zosyn. Blood cx w/NG.     Patient failing to improve despite adequate treatment of UTI. Imaging spine given recent spinal fusion and psoas abscess; MRI shows no concern for active infection in psoas or spine. Neurology consulted for further assistance.     -continue Zosyn  -wean Levo as able. Off since 3/10  -nikki mena for hypothermia    "

## 2024-03-11 NOTE — CONSULTS
NIAS placed 20g 1 3/4 PIV in RFA using u/s guidance.    NIAS placed 18g 1 3/4 PIV in MITCH using u/s guidance.

## 2024-03-11 NOTE — PHYSICIAN QUERY
PT Name: Bj Pate Jr.  MR #: [id number]     DOCUMENTATION CLARIFICATION     CDS/:  Daniel Willis RN, CDS                   Contact Information:  [email or web address]    This form is a permanent document in the medical record.     Query Date: March 11, 2024    By submitting this query, we are merely seeking further clarification of documentation.  Please utilize your independent clinical judgment when addressing the question(s) below.  The Medical Record contains the following   Indicators   Supporting Clinical Findings   Location in Medical Record     X Respiratory failure Acute hypoxic respiratory failure CCM PN attestation 3/11      Subjective Respiratory Signs/Symptoms: SOB, LAUREANO, Cough, etc.     X Objective Respiratory Signs/Symptoms: Respiratory distress, Accessory muscle use, tachypnea, wheezing, etc. This AM pt was noted to have AMS with apneic and hypoxic episodes. He was noted to have a decreased GCS, bradypnea, hypothermia and pinpoint pupils.    [facility] Care Update 3/4   X RR         O2 sat         O2 use RR 12  O2 sat 100%     RR 15  O2 sat 100% @ 50%   VS FS 3/4 1254      VS FS 3/4 1256   X Blood Gas (ABG or VBG) ABGs:    POC PH: 7.360  POC PCO2: 52.7   POC PO2: 60  POC HCO3: 29.8   Sodium, Blood Gas: 142  Potassium, Blood Gas: 4.2  POC SATURATED O2: 89  Sample: VENOUS  POC TCO2: 31   POC Ionized Calcium: 1.31  POC Hematocrit: 26   POC BE: 4  Site: Other    POC PH: 7.313   POC PCO2: 58.2   POC PO2: 52  POC HCO3: 29.5   POC SATURATED O2: 82  Sample: VENOUS  POC TCO2: 31   POC BE: 3   FiO2: 21  DelSys: Room Air  Site: Other  Mode: SPONT         Labs 3/4 0404                            Labs 3/4 1030    Hypoxia/Hypercapnia     X BiPAP/Intubation/Mechanical Ventilation Intubation      Indications: Airway Protection    Patient intubated 3/4 for airway protection.      [facility] Procedure 3/4    CCM PN 3/10    Home O2, Oxygen Dependence     X Radiology findings Impression: Probable trace pleural  effusions and bibasilar subsegmental atelectasis.  No focal consolidation identified on this hypoventilatory limited single view.     CXR 3/4   X Acute/Chronic Illness Sepsis  Hypoglycemia  Hypothermia  Acute hypoxic respiratory failure  Acute metabolic encephalopathy   CCM PN 3/5    Treatment     X Other Pt extubated per MD order. On RA.  RT 3/10         The clinical guidelines noted are only a system guideline. It does not replace the providers clinical judgment.      [facility] Approved Diagnostic Criteria      Acute Respiratory Failure    Hypoxic: ABG pO2<60 mmHg or O2 sat of <91% on RA   AND/OR   Hypercapnic: ABG pCO2>50 mmHg with pH <7.35   AND   Respiratory symptoms documented (Subjective: SOB; Objective: Tachypnea, respiratory distress, increased work of breathing, unable to speak in complete sentences, labored breathing, use of accessory muscles, RR>26, cyanosis, dyspnea, wheezing, stridor, lethargy)      Chronic Respiratory Failure   Hypoxic: Continuous home oxygen    AND/OR   Hypercapnic: Normal pH with high CO2 (ex. COPD)      Acute on Chronic Respiratory Failure   Hypoxic: ABG pO2>10mmHg below baseline OR ABG pO2<60 mmHg OR SpO2<91% on usual home O2  OR O2>2L/min over baseline home O2   AND/OR   Hypercapnic: ABG pCO2>50 mmHg OR pCO2>10mmHg over baseline and pH <7.35   AND   Respiratory symptoms documented      Acute Respiratory Distress Syndrome (ARDS) - an acute, diffuse, inflammatory form of lung injury. Suspect with progressive dyspnea, hypoxemic respiratory failure, and bilateral alveolar infiltrates on chest imaging within 6 to 72 hours of an inciting event.   Acute Respiratory Distress - Generally describes less severe respiratory symptoms (tachypnea, in respiratory distress, increased work of breathing, unable to speak in complete sentences, labored breathing, use of accessory muscles, RR> 24, cyanosis, dyspnea, wheezing, stridor, lethargy) without sufficient measurements (pO2,  SpO2, pH, and pCO2) to meet criteria for respiratory failure)   Acute Respiratory Insufficiency - Generally describes less severe respiratory symptoms and measurements (pO2, SpO2, pH, and pCO2) not meeting criteria for respiratory failure         Provider, due to conflicting documentation please clarify the diagnosis  associated with above clinical findings.     [   x ] Acute Respiratory Failure with Hypoxia     [    ] Patient initially intubated for airway protection that progressed to Acute Respiratory Failure with Hypoxia     [    ] No Respiratory Failure, Maintained on Vent for Routine Care or Airway Protection -  purposely intubated for airway protection (e.g.: angioedema, stroke, trauma); without meeting the criteria for respiratory failure.      [    ] Other Respiratory Diagnosis (please specify): _________________         Please document in your progress notes daily for the duration of treatment until resolved and include in your discharge summary.     Reference:    ELVIS Alvarez MD. (2020, March 13). Acute respiratory distress syndrome: Clinical features, diagnosis, and complications in adults ([id number] BRANDAN Perez MD & [id number] ARACELY Bang MD, Eds.). Retrieved November 13, 2020, from [email or web address]&source=search_result&selectedTitle=1~[email or web address]&usage_type=default&display_rank=1  Form No. 11013

## 2024-03-11 NOTE — PLAN OF CARE
"[facility] - Cardiac Medical ICU  Discharge Reassessment    Primary Care Provider: Jose Antonio Foster MD    Expected Discharge Date: 3/15/2024    Patient not medically ready to discharge per MD. Patient to possibly step down this evening.     sodium chloride 0.9% Stopped (03/07/24 1145)    sodium chloride 0.9% 10 mL/hr at 03/11/24 1300    dextrose 5 % and 0.45 % NaCl       BP (!) 117/59 (Patient Position: Lying)   Pulse 103   Temp 98.7 °F (37.1 °C) (Oral)   Resp 18   Ht 5' 9" (1.753 m)   Wt 115 kg (253 lb 8.5 oz)   SpO2 98%   BMI 37.44 kg/m²     Discharge Plan A and Plan B have been determined by review of patient's clinical status, future medical and therapeutic needs, and coverage/benefits for post-acute care in coordination with multidisciplinary team members.        Reassessment (most recent)       Discharge Reassessment - 03/11/24 1501          Discharge Reassessment    Assessment Type Discharge Planning Reassessment     Did the patient's condition or plan change since previous assessment? Yes     Communicated GLORIA with patient/caregiver Date not available/Unable to determine     Discharge Plan A Skilled Nursing Facility   [facility]    DME Needed Upon Discharge  none     Transition of Care Barriers None     Why the patient remains in the hospital Requires continued medical care        Post-Acute Status    Post-Acute Authorization Placement     Post-Acute Placement Status Pending medical clearance/testing     Discharge Delays None known at this time                     Olesya Mata RN     [phone]    "

## 2024-03-11 NOTE — PLAN OF CARE
MICU DAILY GOALS     Family/Goals of care/Code Status   Code Status: Full Code    24H Vital Sign Range  Temp:  [98.2 °F (36.8 °C)-98.8 °F (37.1 °C)]   Pulse:  []   Resp:  [12-42]   BP: ()/(50-71)   SpO2:  [94 %-100 %]   Arterial Line BP: (108-146)/(48-80)      Shift Events (include procedures and significant events)   No acute events throughout shift, pt received D10 bolus x2. No other PRN meds. Lytes replaced. Weaned of of levo this shift. MD notified of cuff pressures in R arm not correlating with Art Line and running approx 20mmHg below all other extremities. No new orders placed.     AWAKE RASS: Goal - RASS Goal: 0-->alert and calm  Actual - RASS (Anderson Agitation-Sedation Scale): alert and calm    Restraint necessity: Removing medical devices   BREATHE SBT: Not intubated    Coordinate A & B, analgesics/sedatives Pain: managed   SAT: Not intubated   Delirium CAM-ICU: Overall CAM-ICU: Positive   Early(intubated/ Progressive (non-intubated) Mobility MOVE Screen (INTUBATED ONLY): Fail    Activity: Activity Management: Arm raise - L1   Feeding/Nutrition Diet order: Diet/Nutrition Received: NPO,     Thrombus DVT prophylaxis: VTE Required Core Measure: Pharmacological prophylaxis initiated/maintained   HOB Elevation Head of Bed (HOB) Positioning: HOB at 30 degrees   Ulcer Prophylaxis GI: no   Glucose control managed Glycemic Management: blood glucose monitored   Skin Skin assessed during: Q Shift Change    Sacrum intact/not altered? No  Heels intact/not altered? Yes  Surgical wound? No    CHECK ONE!   (no altered skin or altered skin) and sub boxes:  [] No Altered Skin Integrity Present    []Prevention Measures Documented    [x] Altered Skin Integrity Present or Discovered   [x] LDA present in EPIC, daily doc completed              [] LDA added if not in EPIC (describe wound).                    When describing wound, do not stage, use descriptive words only.    [] Wound Image Taken (required on admit,                    transfer/discharge and every Tuesday)    Wound Care Consulted? No    Attending Nurse:     Second RN/Staff Member:    Bowel Function no issues    Indwelling Catheter Necessity      Urethral Catheter 03/04/24 0249 Silicone 16 Fr.-Reason for Continuing Urinary Catheterization: Critically ill in ICU and requiring hourly monitoring of intake/output    Introducer 03/06/24 0454 Internal Jugular Right-Line Necessity Review: Hemodynamic instability     De-escalation Antibiotics Yes        VS and assessment per flow sheet, patient progressing towards goals as tolerated, plan of care reviewed with  Bj Pate Jr. , all concerns addressed, will continue to monitor.

## 2024-03-11 NOTE — ASSESSMENT & PLAN NOTE
74 y.o. male with history of stroke with residual right sided deficits, chronic indwelling bryant, diabetes, and hypertension admitted after initial presentation from SNF with altered mental status. Patient was hypothermic to 87.9, hypotensive w/ MAP 55, and hypoglycemic in the 50s. Hospital course notable for intubation, UTI, and AV block. MRI Brain done the morning after presentation showed chronic white matter changes and remote infarct. MRI CTL spine w/ L2-L3 chronic discitis and C3-C6 edema and post-contrast enhancement c/f chronic spondylodiscitis. Patient extubated on 3/10. On 3/11 is following some commands but remains disoriented and encephalopathic.    Suspect current AMS is related to the patient's multiple toxic/metabolic issues in someone with an already decreased cognitive reserve. Have received varying accounts regarding what the patient's true baseline has been since 11/2023.  Would consider spondylodiscitis as a possible contributor however primary less concerned regarding this.    Recommendations:  Continued treatment of patient's active medical issues   Continue delirium precautions.   If concern arises for possible discitis then please contact neurology for further recommendations.

## 2024-03-11 NOTE — ASSESSMENT & PLAN NOTE
Patient appears to initially of had gross cerebral dysfunction secondary to extrinsic factors (UTI) in setting of poor cognitive reserve. Patient appears to have improved from day prior as he is following 1 step commands on neurologic exam. In addition, electronic documentation reveals patient has been extubated. Suspect extended return to baseline due to history and clinical presentation. Will follow up tomorrow to re-evaluate extubated, however, likely requires extended time while metabolic/toxic/infectious medical problems are controlled for.

## 2024-03-11 NOTE — PT/OT/SLP EVAL
Speech Language Pathology Evaluation  Bedside Swallow    Patient Name:  Bj Pate Jr.   MRN:  [id number]  Admitting Diagnosis: Hypothermia    Recommendations:                 General Recommendations:  Modified barium swallow study  Diet recommendations:  NPO, NPO   Aspiration Precautions: Frequent oral care, HOB to 90 degrees, Ice chips sparingly, Meds crushed in puree, Monitor for s/s of aspiration, Puree for pleasure, and Strict aspiration precautions   General Precautions: Standard, aspiration, fall, NPO  Communication strategies:  go to room if call light pushed    Assessment:     Bj Pate Jr. is a 74 y.o. male with an SLP diagnosis of Dysphagia.     History:     Past Medical History:   Diagnosis Date    Acute on chronic systolic heart failure 12/13/2023    Anemia 06/04/2021    Anticoagulant long-term use     Basal ganglia hemorrhage     Left basal ganglia hemorrhage with resultant right-sided hemiparesis which has resolved.     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        Past Surgical History:   Procedure Laterality Date    CARPAL TUNNEL RELEASE Left 2/19/2020    Procedure: RELEASE, CARPAL TUNNEL LEFT;  Surgeon: Maria Luisa Mccurdy MD;  Location: [facility] OR;  Service: Orthopedics;  Laterality: Left;    COLONOSCOPY N/A 7/28/2023    Procedure: COLONOSCOPY;  Surgeon: Jaylene Alvarado MD;  Location: [facility];  Service: Endoscopy;  Laterality: N/A;     EPIDURAL STEROID INJECTION N/A 5/2/2019    Procedure: Injection, Steroid, Epidural Cervical;  Surgeon: Dariel Doan Jr., MD;  Location: [facility] ENDO;  Service: Pain Management;  Laterality: N/A;  Cervical Epidural Steroid Injection     [id number]    Arrive @ 1130; ASA; Check BG    EPIDURAL STEROID INJECTION Bilateral 5/29/2019    Procedure: Lumbar Medial Branch Blocks;  Surgeon: Dariel Doan Jr., MD;  Location: [facility] ENDO;  Service: Pain Management;  Laterality: Bilateral;  Bilateral Lumbar Medial Branch Blocks L3, L4, L5    [id number]  [id number]    Attive @ 1030 (11 arrival request); NO Sedation; ASA; Check BG    EPIDURAL STEROID INJECTION Bilateral 7/3/2019    Procedure: Lumbar Medial Branch Blocks;  Surgeon: Dariel Doan Jr., MD;  Location: [facility] ENDO;  Service: Pain Management;  Laterality: Bilateral;  Bilateral Lumbar Medial Branch Blocks L3, L4, L5    [id number]  [id number]    Arrive @ 0930; NO Sedation; ASA; Check BG    EPIDURAL STEROID INJECTION N/A 8/7/2019    Procedure: Injection, Steroid, Epidural Cervical;  Surgeon: Dariel Doan Jr., MD;  Location: [facility] ENDO;  Service: Pain Management;  Laterality: N/A;  Cervical Epidural Steroid Injection C7-T1    56243    Arrive @ 1115; Last ASA 7/30; Check BG    ESOPHAGOGASTRODUODENOSCOPY N/A 7/28/2023    Procedure: EGD (ESOPHAGOGASTRODUODENOSCOPY);  Surgeon: Jaylene Alvarado MD;  Location: [facility] ENDO;  Service: Endoscopy;  Laterality: N/A;  inst via portal    FUSION, SPINE, LUMBAR, TLIF, POSTERIOR APPROACH, USING PEDICLE SCREW N/A 12/15/2023    Procedure: L-1 TO L-4 FUSION, SPINE, LUMBAR, TLIF, POSTERIOR APPROACH, USING PEDICLE SCREW;  Surgeon: Rolf Rincon MD;  Location: [facility] OR [address] [place] [address];  Service: Neurosurgery;  Laterality: N/A;    LEFT HEART CATHETERIZATION Left 9/21/2021    Procedure: Left heart cath 9am start, R rad access;  Surgeon: Dariel Conner MD;  Location: [facility] CATH LAB;  Service: Cardiology;  Laterality: Left;  RN PRE OP Covid NEGATIVE ON  9-20-21.  " C A    MIDLINE CATHETER PLACEMENT  12/12/2023    NO PAST SURGERIES       HPI:  73M with PMH CVA with R sided deficits, DM, HTN who presents to the ED from SNF for AMS. Per chart, he was more somnolent than usual over the last 2 days. At baseline he is verbal and follows commands but does not ambulate. On arrival to ED he was hypothermic to 87.9F and found to be hypoglycemic to the 50s. Intermittently hypotensive to MAP 55. Patient is awake but does not interact meaningfully on exam. Chronic Rivera was initially draining cloudy urine with sediment, Rivera exchanged. Initial labs without leukocytosis, CMP unremarkable, VBG with pH 7.49/30.3/38.9, lactate normal. Received 1L NS bolus through warming pump. Initially admitted to , critical care consulted for severe hypothermia with hypotension.     Prior Intubation HX:  3/4 - 3/10    Modified Barium Swallow: none on file    Chest X-Rays: 3/6/24: COMPARISON:  Chest radiograph performed 03/04/2024, 14:23 hours.   FINDINGS:  Monitoring leads overlie the chest.  Defibrillator pads.  Left-sided chest tube/drain.  Enteric tube appears likely coiled within stomach.  Enteric tube tip between thoracic inlet and shelbi.   Grossly similar cardiomediastinal contours.  Patchy opacities in both lungs persist.  Suspect layering pleural effusions.   No definite pneumothorax.   No definite acute change in the visualized abdomen   Remainder of examination not substantially changed.    Prior diet: currently NPO      Subjective     "Bye" Pt with minimal verbal output as he exhibits altered mental status, but he did respond to SLP saying "bye" at end of session.     Pain/Comfort:  Pain Rating 1: 0/10    Respiratory Status: Room air    Objective:     Oral Musculature Evaluation  Oral Musculature: general weakness (difficulty following commands consistently)  Dentition: scattered dentition  Secretion Management: adequate  Mucosal Quality: adequate  Mandibular Strength and Mobility: WFL  Oral " Labial Strength and Mobility: impaired coordination  Lingual Strength and Mobility: impaired left lateral movement, impaired right lateral movement  Volitional Cough: weak  Volitional Swallow: did not follow commands to elicit  Voice Prior to PO Intake: adqeuate volume and intensity    Bedside Swallow Eval:   Consistencies Assessed:  Thin liquids ice chips x 2, 1/3 tsp x 1, 1/2 tsp x 1, full tsp x 2, cup sip x 2, straw sips x 4  Puree 1/2 tsp x 1, full tsp x 2  Mixed consistencies meds crushed and buried in puree x 3      Oral Phase:   Pt stripping small amounts from spoon for puree presentations.    Pharyngeal Phase:   Cough after ice chips, throat clear after full tsp thin water x 1, cup sip x 1, and straw sip x 1    Compensatory Strategies  None    Treatment: Education provided to pt and daughter regarding role of SLP, purpose of swallowing assessment, dysphagia and increased risk of aspiration associated with intubation and altered mental status, aspiration, overt vs subtle s/s of aspiration, complications associated with aspiration, recommendations to remain NPO except for meds crushed in puree, ok for puree for pleasure and ice chips sparingly with regular oral care, recommendations for MBSS to r/o aspiration, and SLP treatment plan and POC. Pt's daughter expressed understanding. Pt will benefit from continued reinforcement.     Goals:   Multidisciplinary Problems       SLP Goals          Problem: SLP    Goal Priority Disciplines Outcome   SLP Goal     SLP    Description: Speech Language Pathology Goals  Goals expected to be met by 3/18:  1. Pt will participate in Modified Barium Swallow Study to r/o aspiration and determine safest PO diet.                                Plan:     Patient to be seen:  5 x/week   Plan of Care expires:  04/12/24  Plan of Care reviewed with:  patient, daughter   SLP Follow-Up:  Yes       Discharge recommendations:   (tbd)     Time Tracking:     SLP Treatment Date:    03/11/24  Speech Start Time:  1230  Speech Stop Time:  1255     Speech Total Time (min):  25 min    Billable Minutes: Eval Swallow and Oral Function 15 and Self Care/Home Management Training 10    03/11/2024

## 2024-03-12 PROBLEM — I44.1 SECOND DEGREE AV BLOCK, MOBITZ TYPE I: Status: ACTIVE | Noted: 2024-03-06

## 2024-03-12 PROBLEM — R13.10 DYSPHAGIA: Status: ACTIVE | Noted: 2024-03-12

## 2024-03-12 LAB
ALBUMIN SERPL BCP-MCNC: 1.6 G/DL (ref 3.5–5.2)
ALP SERPL-CCNC: 96 U/L (ref 55–135)
ALT SERPL W/O P-5'-P-CCNC: 11 U/L (ref 10–44)
ANION GAP SERPL CALC-SCNC: 10 MMOL/L (ref 8–16)
AST SERPL-CCNC: 27 U/L (ref 10–40)
BASOPHILS # BLD AUTO: 0.02 K/UL (ref 0–0.2)
BASOPHILS NFR BLD: 0.3 % (ref 0–1.9)
BILIRUB SERPL-MCNC: 0.4 MG/DL (ref 0.1–1)
BUN SERPL-MCNC: 15 MG/DL (ref 8–23)
CALCIUM SERPL-MCNC: 9.7 MG/DL (ref 8.7–10.5)
CHLORIDE SERPL-SCNC: 102 MMOL/L (ref 95–110)
CO2 SERPL-SCNC: 23 MMOL/L (ref 23–29)
CREAT SERPL-MCNC: 1.3 MG/DL (ref 0.5–1.4)
DIFFERENTIAL METHOD BLD: ABNORMAL
EOSINOPHIL # BLD AUTO: 0.2 K/UL (ref 0–0.5)
EOSINOPHIL NFR BLD: 2 % (ref 0–8)
ERYTHROCYTE [DISTWIDTH] IN BLOOD BY AUTOMATED COUNT: 17 % (ref 11.5–14.5)
EST. GFR  (NO RACE VARIABLE): 58.4 ML/MIN/1.73 M^2
GLUCOSE SERPL-MCNC: 75 MG/DL (ref 70–110)
HCT VFR BLD AUTO: 22.9 % (ref 40–54)
HGB BLD-MCNC: 7.5 G/DL (ref 14–18)
IMM GRANULOCYTES # BLD AUTO: 0.09 K/UL (ref 0–0.04)
IMM GRANULOCYTES NFR BLD AUTO: 1.1 % (ref 0–0.5)
LYMPHOCYTES # BLD AUTO: 1 K/UL (ref 1–4.8)
LYMPHOCYTES NFR BLD: 12.7 % (ref 18–48)
MAGNESIUM SERPL-MCNC: 1.9 MG/DL (ref 1.6–2.6)
MCH RBC QN AUTO: 26.7 PG (ref 27–31)
MCHC RBC AUTO-ENTMCNC: 32.8 G/DL (ref 32–36)
MCV RBC AUTO: 82 FL (ref 82–98)
MONOCYTES # BLD AUTO: 0.9 K/UL (ref 0.3–1)
MONOCYTES NFR BLD: 11.5 % (ref 4–15)
NEUTROPHILS # BLD AUTO: 5.7 K/UL (ref 1.8–7.7)
NEUTROPHILS NFR BLD: 72.4 % (ref 38–73)
NRBC BLD-RTO: 0 /100 WBC
PHOSPHATE SERPL-MCNC: 3.2 MG/DL (ref 2.7–4.5)
PLATELET # BLD AUTO: 274 K/UL (ref 150–450)
PMV BLD AUTO: 10.1 FL (ref 9.2–12.9)
POCT GLUCOSE: 100 MG/DL (ref 70–110)
POCT GLUCOSE: 104 MG/DL (ref 70–110)
POCT GLUCOSE: 82 MG/DL (ref 70–110)
POCT GLUCOSE: 82 MG/DL (ref 70–110)
POCT GLUCOSE: 89 MG/DL (ref 70–110)
POCT GLUCOSE: 92 MG/DL (ref 70–110)
POTASSIUM SERPL-SCNC: 3.9 MMOL/L (ref 3.5–5.1)
PROT SERPL-MCNC: 7 G/DL (ref 6–8.4)
RBC # BLD AUTO: 2.81 M/UL (ref 4.6–6.2)
SODIUM SERPL-SCNC: 135 MMOL/L (ref 136–145)
WBC # BLD AUTO: 7.86 K/UL (ref 3.9–12.7)

## 2024-03-12 PROCEDURE — 83735 ASSAY OF MAGNESIUM: CPT

## 2024-03-12 PROCEDURE — 97535 SELF CARE MNGMENT TRAINING: CPT

## 2024-03-12 PROCEDURE — 99233 SBSQ HOSP IP/OBS HIGH 50: CPT | Mod: GC,,, | Performed by: INTERNAL MEDICINE

## 2024-03-12 PROCEDURE — 99900035 HC TECH TIME PER 15 MIN (STAT)

## 2024-03-12 PROCEDURE — 25000003 PHARM REV CODE 250

## 2024-03-12 PROCEDURE — 84100 ASSAY OF PHOSPHORUS: CPT

## 2024-03-12 PROCEDURE — 80053 COMPREHEN METABOLIC PANEL: CPT

## 2024-03-12 PROCEDURE — 97110 THERAPEUTIC EXERCISES: CPT

## 2024-03-12 PROCEDURE — 94761 N-INVAS EAR/PLS OXIMETRY MLT: CPT

## 2024-03-12 PROCEDURE — 63600175 PHARM REV CODE 636 W HCPCS: Performed by: STUDENT IN AN ORGANIZED HEALTH CARE EDUCATION/TRAINING PROGRAM

## 2024-03-12 PROCEDURE — 97162 PT EVAL MOD COMPLEX 30 MIN: CPT

## 2024-03-12 PROCEDURE — S5010 5% DEXTROSE AND 0.45% SALINE: HCPCS | Performed by: INTERNAL MEDICINE

## 2024-03-12 PROCEDURE — 25000003 PHARM REV CODE 250: Performed by: STUDENT IN AN ORGANIZED HEALTH CARE EDUCATION/TRAINING PROGRAM

## 2024-03-12 PROCEDURE — 97112 NEUROMUSCULAR REEDUCATION: CPT

## 2024-03-12 PROCEDURE — 63600175 PHARM REV CODE 636 W HCPCS

## 2024-03-12 PROCEDURE — 25000003 PHARM REV CODE 250: Performed by: INTERNAL MEDICINE

## 2024-03-12 PROCEDURE — A9698 NON-RAD CONTRAST MATERIALNOC: HCPCS | Performed by: INTERNAL MEDICINE

## 2024-03-12 PROCEDURE — 92611 MOTION FLUOROSCOPY/SWALLOW: CPT

## 2024-03-12 PROCEDURE — 85025 COMPLETE CBC W/AUTO DIFF WBC: CPT

## 2024-03-12 PROCEDURE — 97166 OT EVAL MOD COMPLEX 45 MIN: CPT

## 2024-03-12 PROCEDURE — 20600001 HC STEP DOWN PRIVATE ROOM

## 2024-03-12 PROCEDURE — 25500020 PHARM REV CODE 255: Performed by: INTERNAL MEDICINE

## 2024-03-12 RX ORDER — DEXTROSE MONOHYDRATE AND SODIUM CHLORIDE 5; .45 G/100ML; G/100ML
INJECTION, SOLUTION INTRAVENOUS CONTINUOUS
Status: DISCONTINUED | OUTPATIENT
Start: 2024-03-12 | End: 2024-03-13

## 2024-03-12 RX ORDER — MAGNESIUM SULFATE 1 G/100ML
1 INJECTION INTRAVENOUS ONCE
Status: COMPLETED | OUTPATIENT
Start: 2024-03-12 | End: 2024-03-12

## 2024-03-12 RX ORDER — ENOXAPARIN SODIUM 100 MG/ML
40 INJECTION SUBCUTANEOUS EVERY 24 HOURS
Status: DISCONTINUED | OUTPATIENT
Start: 2024-03-12 | End: 2024-03-27 | Stop reason: HOSPADM

## 2024-03-12 RX ADMIN — POLYETHYLENE GLYCOL 3350 17 G: 17 POWDER, FOR SOLUTION ORAL at 08:03

## 2024-03-12 RX ADMIN — SODIUM CHLORIDE, POTASSIUM CHLORIDE, SODIUM LACTATE AND CALCIUM CHLORIDE 500 ML: 600; 310; 30; 20 INJECTION, SOLUTION INTRAVENOUS at 06:03

## 2024-03-12 RX ADMIN — SILODOSIN 4 MG: 4 CAPSULE ORAL at 08:03

## 2024-03-12 RX ADMIN — HEPARIN SODIUM 5000 UNITS: 5000 INJECTION INTRAVENOUS; SUBCUTANEOUS at 05:03

## 2024-03-12 RX ADMIN — SENNOSIDES AND DOCUSATE SODIUM 1 TABLET: 8.6; 5 TABLET ORAL at 08:03

## 2024-03-12 RX ADMIN — ENOXAPARIN SODIUM 40 MG: 40 INJECTION SUBCUTANEOUS at 05:03

## 2024-03-12 RX ADMIN — MICONAZOLE NITRATE 2 % TOPICAL POWDER: at 08:03

## 2024-03-12 RX ADMIN — BARIUM SULFATE 50 ML: 0.81 POWDER, FOR SUSPENSION ORAL at 09:03

## 2024-03-12 RX ADMIN — ATORVASTATIN CALCIUM 40 MG: 40 TABLET, FILM COATED ORAL at 08:03

## 2024-03-12 RX ADMIN — MAGNESIUM SULFATE HEPTAHYDRATE 1 G: 500 INJECTION, SOLUTION INTRAMUSCULAR; INTRAVENOUS at 05:03

## 2024-03-12 RX ADMIN — DEXTROSE AND SODIUM CHLORIDE: 5; 450 INJECTION, SOLUTION INTRAVENOUS at 08:03

## 2024-03-12 NOTE — ASSESSMENT & PLAN NOTE
Stable.  Patient's blood pressure is slightly low today.  Monitoring BP at home.  Currently taking losartan 50mg daily as directed. Also taking baby aspirin.  Denies lightheadedness, vision changes, headache, palpitations or leg swelling.   Verbal and follows command at baseline, was not doing upon admission and for 2 days prior. Has right-sided deficits.       Patient intubated for airway protection and being unable to follow commands. Extubated on 3/10  MRI unrevealing   EEG monitoring unrevealing

## 2024-03-12 NOTE — ASSESSMENT & PLAN NOTE
"This patient does have evidence of infective focus  My overall impression is sepsis.  Source: Urinary Tract  Antibiotics given-   Antibiotics (72h ago, onward)      None          Latest lactate reviewed-  No results for input(s): "LACTATE", "POCLAC" in the last 72 hours.    Organ dysfunction indicated by Encephalopathy    Fluid challenge Ideal Body Weight- The patient's ideal body weight is Ideal body weight: 70.7 kg (155 lb 13.8 oz) which will be used to calculate fluid bolus of 30 ml/kg for treatment of septic shock.      Post- resuscitation assessment No - Post resuscitation assessment not needed       Will Not start Pressors- Levophed for MAP of 65  Source control achieved by: IV abx    74M with hx CVA, osteomyelitis s/p neurosurgical intervention presents to [facility] from SNF for AMS x 2 days. Found to be hypothermic and hypoglycemic with intermittent readings of hypotension to MAP 55. Lactic acid 1. Suspect urinary source given cloudy urine draining from Rivera on presentation. Patient encephalopathic and intubated for airway protection. S/p 1L warmed IVF, started on Vanc/Zosyn. Urine with citrobacter sensitive to Zosyn. Blood cx w/NG.     Patient failing to improve despite adequate treatment of UTI. Imaging spine given recent spinal fusion and psoas abscess; MRI shows no concern for active infection in psoas or spine. Neurology consulted for further assistance.     -completed 7 day course of zosyn  -wean Levo as able. Off since 3/10  -nikki jamshider for hypothermia, now resolved    Resolved.    "

## 2024-03-12 NOTE — ASSESSMENT & PLAN NOTE
"This patient does have evidence of infective focus  My overall impression is sepsis.  Source: Urinary Tract  Antibiotics given-   Antibiotics (72h ago, onward)      None          Latest lactate reviewed-  No results for input(s): "LACTATE", "POCLAC" in the last 72 hours.  Organ dysfunction indicated by Encephalopathy    Fluid challenge Not needed - patient is not hypotensive      Post- resuscitation assessment No - Post resuscitation assessment not needed       Will Not start Pressors- Levophed for MAP of 65  Source control achieved by: antibiotics  "

## 2024-03-12 NOTE — PROGRESS NOTES
[facility] - Cardiac Medical ICU  Critical Care Medicine  Progress Note    Patient Name: Bj Pate Jr.  MRN: [id number]  Admission Date: 3/4/2024  Hospital Length of Stay: 8 days  Code Status: Full Code  Attending Provider: Lewis Stone MD  Primary Care Provider: Jose Antonio Foster MD   Principal Problem: Hypothermia    Subjective:     HPI:  73M with PMH CVA with R sided deficits, DM, HTN who presents to the ED from SNF for AMS. Per chart, he was more somnolent than usual over the last 2 days. At baseline he is verbal and follows commands but does not ambulate. On arrival to ED he was hypothermic to 87.9F and found to be hypoglycemic to the 50s. Intermittently hypotensive to MAP 55. Patient is awake but does not interact meaningfully on exam. Chronic Rivera was initially draining cloudy urine with sediment, Rivera exchanged. Initial labs without leukocytosis, CMP unremarkable, VBG with pH 7.49/30.3/38.9, lactate normal. Received 1L NS bolus through warming pump. Initially admitted to , critical care consulted for severe hypothermia with hypotension.     Hospital/ICU Course:  Patient intubated 3/4 for airway protection. Sedated and requiring Levophed. Urine with Citrobacter, abx narrowed accordingly. Developed AV block, EP consulted and cordis CVC placed. TVP deemed unnecessary given no evidence of 3rd degree heart block. Extubated to room air 3/10. Neurology consulted for evaluation of other causes of encephalopathy. Patient failed modified barium swallow study, NG tube placed on 3/12.    Interval History/Significant Events: NAEON. Patient arousable to voice but no improvement to mentation or ability to follow commands. Unable to successfully completed MBSS. NG tube placed for starting tube feeds. Patient stable for  stepdown.    Review of Systems   Unable to perform ROS: Mental status change     Objective:     Vital Signs (Most Recent):  Temp: 99.8 °F (37.7 °C) (03/12/24 0800)  Pulse: 107 (03/12/24  1100)  Resp: (!) 32 (03/12/24 1100)  BP: 114/77 (03/12/24 1100)  SpO2: 98 % (03/12/24 1100) Vital Signs (24h Range):  Temp:  [98.2 °F (36.8 °C)-99.8 °F (37.7 °C)] 99.8 °F (37.7 °C)  Pulse:  [101-118] 107  Resp:  [15-46] 32  SpO2:  [95 %-99 %] 98 %  BP: (101-159)/(55-88) 114/77  Arterial Line BP: (127-133)/(62-63) 133/62   Weight: 112.5 kg (248 lb 0.3 oz)  Body mass index is 36.63 kg/m².      Intake/Output Summary (Last 24 hours) at 3/12/2024 1151  Last data filed at 3/12/2024 1100  Gross per 24 hour   Intake 1970.98 ml   Output 820 ml   Net 1150.98 ml          Physical Exam  Vitals reviewed.   Constitutional:       General: He is not in acute distress.     Appearance: He is not ill-appearing.   HENT:      Head: Normocephalic and atraumatic.      Mouth/Throat:      Comments: Poor dentition  Eyes:      Pupils: Pupils are equal, round, and reactive to light.   Cardiovascular:      Rate and Rhythm: Normal rate and regular rhythm.   Pulmonary:      Effort: Pulmonary effort is normal.      Breath sounds: Normal breath sounds.   Abdominal:      General: Bowel sounds are normal.      Palpations: Abdomen is soft.      Tenderness: There is no abdominal tenderness.   Musculoskeletal:      Right lower leg: No edema.      Left lower leg: No edema.      Comments: Bilateral upper extremity contractures   Skin:     General: Skin is warm.   Neurological:      Mental Status: He is alert. He is disoriented.            Vents:  Vent Mode: Spont (03/10/24 1207)  Set Rate: 12 BPM (03/10/24 0740)  Vt Set: 480 mL (03/10/24 0740)  Pressure Support: 5 cmH20 (03/10/24 1207)  PEEP/CPAP: 5 cmH20 (03/10/24 1207)  Oxygen Concentration (%): 30 (03/10/24 1405)  Peak Airway Pressure: 11 cmH20 (03/10/24 1207)  Plateau Pressure: 8.2 cmH20 (03/10/24 1207)  Total Ve: 8.82 L/m (03/10/24 1207)  Negative Inspiratory Force (cm H2O): 0 (03/10/24 1405)  F/VT Ratio<105 (RSBI): (!) 58.05 (03/10/24 1207)  Lines/Drains/Airways       Drain  Duration                   Urethral Catheter 03/04/24 0249 Silicone 16 Fr. 8 days         NG/OG Tube 03/12/24 1148 14 Fr. Right nostril <1 day              Peripheral Intravenous Line  Duration                  Peripheral IV - Single Lumen 03/11/24 1540 18 G;1 3/4 in Anterior;Right Upper Arm <1 day         Peripheral IV - Single Lumen 03/11/24 1541 20 G;1 3/4 in Anterior;Right Forearm <1 day                  Significant Labs:    CBC/Anemia Profile:  Recent Labs   Lab 03/11/24  0353 03/11/24  1305 03/12/24  0343   WBC 7.91 7.08 7.86   HGB 7.0* 7.1* 7.5*   HCT 21.7* 21.6* 22.9*   * 183 274   MCV 84 82 82   RDW 16.9* 16.9* 17.0*        Chemistries:  Recent Labs   Lab 03/11/24  0353 03/12/24  0343   * 135*   K 3.7 3.9    102   CO2 26 23   BUN 16 15   CREATININE 1.3 1.3   CALCIUM 9.4 9.7   ALBUMIN 1.5* 1.6*   PROT 6.3 7.0   BILITOT 0.5 0.4   ALKPHOS 92 96   ALT 11 11   AST 20 27   MG 1.7 1.9   PHOS 2.9 3.2       All pertinent labs within the past 24 hours have been reviewed.    Significant Imaging:  I have reviewed all pertinent imaging results/findings within the past 24 hours.    ABG  Recent Labs   Lab 03/10/24  0606   PH 7.345*   PO2 119*   PCO2 40.8   HCO3 22.3*   BE -3*     Assessment/Plan:     Neuro  Acute encephalopathy  Patient brought from nursing facility d/t altered mentation, more lethargic than normal. Suspect encephalopathy is due to acute infection. Intubated for airway protection. Extubated to room air 3/10. Neurology consulted given persistent encephalopathy despite treatment of UTI.     -f/u neurology recs. Recommending possible LP for evaluation of discitis in setting of ongoing encephalopathy. Patient's mentation somewhat improved following treatment for UTI however unclear if this is patient's new baseline following stroke given nursing home reports.   - delirium precautions in place    Hemiparesis  Bilateral upper extremity contractures noted. Hx of hemiparesis following previous CVA.    History of CVA  "(cerebrovascular accident)  Verbal and follows command at baseline, was not doing upon admission and for 2 days prior. Has right-sided deficits.       Patient intubated for airway protection and being unable to follow commands. Extubated on 3/10  MRI unrevealing   EEG monitoring unrevealing     Cardiac/Vascular  Complete heart block  Concern for complete heart block overnight 3/6. EP consulted. Cordis and A line in place. Following >48hrs w/o evidence of complete heart block, EP advised that Cordis may be removed if desired as patient will not require pacing.    Plan:  - A line, cordis in place   - plan to remove on 3/11 as no longer required for pacing    Renal/  Urinary tract infection without hematuria  UA following admission concerning for UTI. Urine cultures growing Citrobacter farmerii. Patient now s/p 7 days zosyn.    - maintaining bryant catheter    ID  Sepsis  This patient does have evidence of infective focus  My overall impression is sepsis.  Source: Urinary Tract  Antibiotics given-   Antibiotics (72h ago, onward)      None          Latest lactate reviewed-  No results for input(s): "LACTATE", "POCLAC" in the last 72 hours.    Organ dysfunction indicated by Encephalopathy    Fluid challenge Ideal Body Weight- The patient's ideal body weight is Ideal body weight: 70.7 kg (155 lb 13.8 oz) which will be used to calculate fluid bolus of 30 ml/kg for treatment of septic shock.      Post- resuscitation assessment No - Post resuscitation assessment not needed       Will Not start Pressors- Levophed for MAP of 65  Source control achieved by: IV abx    74M with hx CVA, osteomyelitis s/p neurosurgical intervention presents to [facility] from SNF for AMS x 2 days. Found to be hypothermic and hypoglycemic with intermittent readings of hypotension to MAP 55. Lactic acid 1. Suspect urinary source given cloudy urine draining from Bryant on presentation. Patient encephalopathic and intubated for airway protection. S/p 1L " warmed IVF, started on Vanc/Zosyn. Urine with citrobacter sensitive to Zosyn. Blood cx w/NG.     Patient failing to improve despite adequate treatment of UTI. Imaging spine given recent spinal fusion and psoas abscess; MRI shows no concern for active infection in psoas or spine. Neurology consulted for further assistance.     -completed 7 day course of zosyn  -wean Levo as able. Off since 3/10  -nikki hugger for hypothermia, now resolved    Resolved.      Endocrine  Hypoglycemia  Suspect hypoglycemia may be due to infection. See 'sepsis'    Other  * Hypothermia  Pt's body temp was 87 degrees Farenheit on admission. Pt was shivering, hypoglycemia noted. S/p 1L warmed IVF, nikki hugger in place. Patient now normothermic, continuing to monitor.       Critical Care Daily Checklist:    A: Awake: RASS Goal/Actual Goal: RASS Goal: 0-->alert and calm  Actual:     B: Spontaneous Breathing Trial Performed? Spon. Breathing Trial Initiated?: Initiated (03/09/24 0858)   C: SAT & SBT Coordinated?  N/a                      D: Delirium: CAM-ICU Overall CAM-ICU: Positive   E: Early Mobility Performed? Yes   F: Feeding Goal: Goals: Meet % of EEN/EPN by RD f/u date  Status: Nutrition Goal Status: goal not met   Current Diet Order   Procedures    Diet NPO Except for: Ice Chips (can bury crushed meds in pureed foods), Medication     Order Specific Question:   Except for     Answer:   Ice Chips     Comments:   can bury crushed meds in pureed foods     Order Specific Question:   Except for     Answer:   Medication      AS: Analgesia/Sedation none   T: Thromboembolic Prophylaxis heparin   H: HOB > 300 Yes   U: Stress Ulcer Prophylaxis (if needed) N/a   G: Glucose Control Controlled on D5 1/2NS infusion   B: Bowel Function Stool Occurrence: 1   I: Indwelling Catheter (Lines & Bryant) Necessity PIV x2, bryant catheter   D: De-escalation of Antimicrobials/Pharmacotherapies     Plan for the day/ETD [place] to HM; NG tube placement    Code  Status:  Family/Goals of Care: Full Code         Critical secondary to Patient has a condition that poses threat to life and bodily function: acute encephalopathy      Critical care was time spent personally by me on the following activities: development of treatment plan with patient or surrogate and bedside caregivers, discussions with consultants, evaluation of patient's response to treatment, examination of patient, ordering and performing treatments and interventions, ordering and review of laboratory studies, ordering and review of radiographic studies, pulse oximetry, re-evaluation of patient's condition. This critical care time did not overlap with that of any other provider or involve time for any procedures.     Shelbie Souza MD  Critical Care Medicine  [facility] - Cardiac Medical ICU

## 2024-03-12 NOTE — ASSESSMENT & PLAN NOTE
Patient brought from nursing facility d/t altered mentation, more lethargic than normal. Suspect encephalopathy is due to acute infection. Intubated for airway protection. Extubated to room air 3/10. Neurology consulted given persistent encephalopathy despite treatment of UTI.     -f/u neurology recs. Recommending possible LP for evaluation of discitis in setting of ongoing encephalopathy. Patient's mentation somewhat improved following treatment for UTI however unclear if this is patient's new baseline following stroke given nursing home reports.   - delirium precautions in place

## 2024-03-12 NOTE — PLAN OF CARE
Problem: Occupational Therapy  Goal: Occupational Therapy Goal  Description: Goals to be met by: 4/2/2024     Patient will increase functional independence with ADLs by performing:    UE Dressing with Moderate Assistance.  Grooming while seated with Minimal Assistance.   Sitting at edge of bed x8 minutes with Contact Guard Assistance.  Supine to sit with Moderate Assistance.  Sit<>stand transfers with Minimal Assistance.  Upper extremity exercise program 2x10 reps, with supervision.    Outcome: Ongoing, Progressing   Patient's goals are set.

## 2024-03-12 NOTE — PROCEDURES
Modified Barium Swallow    Patient Name:  Bj Pate Jr.   MRN:  [id number]      Recommendations:     Recommendations:                General Recommendations:  Dysphagia therapy  Diet recommendations:  NPO, NPO; small sips/bites of  puree and thin liquids for pleasure when awake/alert with aspiration precautions  Aspiration Precautions: 1 bite/sip at a time, Alternating bites/sips, Assistance with meals, Check for pocketing/oral residue, Eliminate distractions, Feed only when awake/alert, Puree for pleasure, Small bites/sips, and Strict aspiration precautions   General Precautions: Standard, aspiration, fall, NPO  Communication strategies:  yes/no questions only, provide increased time to answer, and go to room if call light pushed    Referral     Reason for Referral  Patient was referred for a Modified Barium Swallow Study to assess the efficiency of his/her swallow function, rule out aspiration and make recommendations regarding safe dietary consistencies, effective compensatory strategies, and safe eating environment.     Diagnosis: Hypothermia       History:     Past Medical History:   Diagnosis Date    Acute on chronic systolic heart failure 12/13/2023    Anemia 06/04/2021    Anticoagulant long-term use     Basal ganglia hemorrhage     Left basal ganglia hemorrhage with resultant right-sided hemiparesis which has resolved.     Benign hypertension with CKD (chronic kidney disease) stage III      Cataract     Chronic idiopathic gout of multiple sites     Chronic kidney disease, stage 3     COPD (chronic obstructive pulmonary disease)     Erectile dysfunction     Gout     Hemorrhoids without complication     Hyperlipidemia     Morbid obesity     Obstructive sleep apnea on CPAP     Reactive airway disease without complication 11/12/2021    Severe sepsis 12/11/2023    Stroke 2016, 2006    Thalamic infarct, acute (right) 01/2016    Type 2 DM with CKD stage 3 and hypertension     On pravastatin for cardiovascular  protection.        Objective:     Current Respiratory Status:      Alert: inconsistent; eye closed for most of the study    Cooperative: inconsistent    Follows Directions: no    Visualization  Patient was seen in the lateral view    Oral Peripheral Examination  Oral Musculature: general weakness (difficulty following commands consistently)  Dentition: scattered dentition  Secretion Management: adequate  Mucosal Quality: adequate  Mandibular Strength and Mobility: WFL  Oral Labial Strength and Mobility: impaired coordination  Lingual Strength and Mobility: impaired left lateral movement, impaired right lateral movement  Volitional Cough: weak  Volitional Swallow: did not follow commands to elicit  Voice Prior to PO Intake: adqeuate volume and intensity    Consistencies Assessed  Thin teaspoon trials x3 and cup trialsx2   Nectar thick teaspoon x1   Puree teaspoon x2    Oral Preparation/Oral Phase  Anterior loss of liquid bolus  Poor bolus formation and control  Oral residue present post swallow  Poor acceptance of bolus  Unable to suck from a straw  Poor lip seal with cup sip    Pharyngeal Phase     Pt with swallow delay with pooling to the pyriforms. Pt with no penetration or aspiration noted with teaspoon size sips of thin liquids. Trace valleculae and pyriform sinus stasis noted. Pt unable to orally contain thin liquid with cup trials as most bolus loss anteriorly. Pt also unable to suck from a straw. With pureed bolus, pt with decreased oral acceptance and therapist had to place bolus in oral cavity. When placed in oral cavity, pt with poor ap transit of pureed bolus initially. Pt with significant swallow delay with pooling to the level of the pyriforms. Dry spoon did not help initiate a swallow response. Pt did swallow a portion of pureed bolus when fluoro was turned off. Pt given liquid bolus to help clear the pureed bolus. Pt with penetration noted at that time. Pt with mod valleculae, min to mod pyriform  sinus stasis and min pharyngeal wall stasis following the liquid wash with pureed swallow. No aspiration noted. Pt also given nectar thick liquids with no penetration or aspiration but increased stasis in all areas. Dry spoon did not help initiate a swallow response. Pt with decreased hyolaryngeal elevation/excursion, decreased tongue base retraction and reduced pharyngeal stripping wave,       Cervical Esophageal Phase  Functional based on limited trials    Assessment:     Impressions    Patient demonstrates moderate  Oropharyngeal  dysphagia characterized by decreased oral acceptance, poor oral transit, swallow delay and penetration of thin liquids . Do not feel pt is able to obtain enough nutrition via po intake at this time. Do feel pt may be able to advance to full po diet if/when he becomes more awake/alert. Pt may have been more limited with swallowing during study due to taste of barium as pt with improved acceptance of pureed bolus at the bedside per previous note and nursing.       Prognosis: Guarded    Barriers:  Fatigue  Cognitive status  Poor intake    Plan  Will continue to follow pt for dysphagia tx.     Education  Results were discussed with patient but pt unable to express understanding. No family present. Results were discussed with Medical Team who was in agreement with plan.     Goals:   Multidisciplinary Problems       SLP Goals          Problem: SLP    Goal Priority Disciplines Outcome   SLP Goal     SLP    Description: Speech Language Pathology Goals  Goals expected to be met by 3/18:  1. Pt will participate in Modified Barium Swallow Study to r/o aspiration and determine safest PO diet.                                Plan:   Patient to be seen:  Therapy Frequency: 5 x/week   Plan of Care expires:  04/12/24  Plan of Care reviewed with:  patient, daughter        Discharge recommendations:   (tbd)       Time Tracking:   SLP Treatment Date:      Speech Start Time:     Speech Stop Time:         Speech Total Time (min):       03/12/2024

## 2024-03-12 NOTE — SUBJECTIVE & OBJECTIVE
Interval History/Significant Events: NAEON. Patient arousable to voice but no improvement to mentation or ability to follow commands. Unable to successfully completed MBSS. NG tube placed for starting tube feeds. Patient stable for HM stepdown.    Review of Systems   Unable to perform ROS: Mental status change     Objective:     Vital Signs (Most Recent):  Temp: 99.8 °F (37.7 °C) (03/12/24 0800)  Pulse: 107 (03/12/24 1100)  Resp: (!) 32 (03/12/24 1100)  BP: 114/77 (03/12/24 1100)  SpO2: 98 % (03/12/24 1100) Vital Signs (24h Range):  Temp:  [98.2 °F (36.8 °C)-99.8 °F (37.7 °C)] 99.8 °F (37.7 °C)  Pulse:  [101-118] 107  Resp:  [15-46] 32  SpO2:  [95 %-99 %] 98 %  BP: (101-159)/(55-88) 114/77  Arterial Line BP: (127-133)/(62-63) 133/62   Weight: 112.5 kg (248 lb 0.3 oz)  Body mass index is 36.63 kg/m².      Intake/Output Summary (Last 24 hours) at 3/12/2024 1151  Last data filed at 3/12/2024 1100  Gross per 24 hour   Intake 1970.98 ml   Output 820 ml   Net 1150.98 ml          Physical Exam  Vitals reviewed.   Constitutional:       General: He is not in acute distress.     Appearance: He is not ill-appearing.   HENT:      Head: Normocephalic and atraumatic.      Mouth/Throat:      Comments: Poor dentition  Eyes:      Pupils: Pupils are equal, round, and reactive to light.   Cardiovascular:      Rate and Rhythm: Normal rate and regular rhythm.   Pulmonary:      Effort: Pulmonary effort is normal.      Breath sounds: Normal breath sounds.   Abdominal:      General: Bowel sounds are normal.      Palpations: Abdomen is soft.      Tenderness: There is no abdominal tenderness.   Musculoskeletal:      Right lower leg: No edema.      Left lower leg: No edema.      Comments: Bilateral upper extremity contractures   Skin:     General: Skin is warm.   Neurological:      Mental Status: He is alert. He is disoriented.            Vents:  Vent Mode: Spont (03/10/24 1207)  Set Rate: 12 BPM (03/10/24 0740)  Vt Set: 480 mL (03/10/24  0740)  Pressure Support: 5 cmH20 (03/10/24 1207)  PEEP/CPAP: 5 cmH20 (03/10/24 1207)  Oxygen Concentration (%): 30 (03/10/24 1405)  Peak Airway Pressure: 11 cmH20 (03/10/24 1207)  Plateau Pressure: 8.2 cmH20 (03/10/24 1207)  Total Ve: 8.82 L/m (03/10/24 1207)  Negative Inspiratory Force (cm H2O): 0 (03/10/24 1405)  F/VT Ratio<105 (RSBI): (!) 58.05 (03/10/24 1207)  Lines/Drains/Airways       Drain  Duration                  Urethral Catheter 03/04/24 0249 Silicone 16 Fr. 8 days         NG/OG Tube 03/12/24 1148 14 Fr. Right nostril <1 day              Peripheral Intravenous Line  Duration                  Peripheral IV - Single Lumen 03/11/24 1540 18 G;1 3/4 in Anterior;Right Upper Arm <1 day         Peripheral IV - Single Lumen 03/11/24 1541 20 G;1 3/4 in Anterior;Right Forearm <1 day                  Significant Labs:    CBC/Anemia Profile:  Recent Labs   Lab 03/11/24  0353 03/11/24  1305 03/12/24  0343   WBC 7.91 7.08 7.86   HGB 7.0* 7.1* 7.5*   HCT 21.7* 21.6* 22.9*   * 183 274   MCV 84 82 82   RDW 16.9* 16.9* 17.0*        Chemistries:  Recent Labs   Lab 03/11/24  0353 03/12/24  0343   * 135*   K 3.7 3.9    102   CO2 26 23   BUN 16 15   CREATININE 1.3 1.3   CALCIUM 9.4 9.7   ALBUMIN 1.5* 1.6*   PROT 6.3 7.0   BILITOT 0.5 0.4   ALKPHOS 92 96   ALT 11 11   AST 20 27   MG 1.7 1.9   PHOS 2.9 3.2       All pertinent labs within the past 24 hours have been reviewed.    Significant Imaging:  I have reviewed all pertinent imaging results/findings within the past 24 hours.

## 2024-03-12 NOTE — PT/OT/SLP EVAL
Occupational Therapy   Evaluation/Treatment    Name: Bj Pate Jr.  MRN: [id number]  Admitting Diagnosis: Hypothermia  Recent Surgery: * No surgery found *      Recommendations:     Discharge Recommendations: Moderate Intensity Therapy  Discharge Equipment Recommendations:  to be determined by next level of care  Barriers to discharge:  Decreased caregiver support (increased level of assistance needed at this time.)    Assessment:     Bj Pate Jr. is a 74 y.o. male with a medical diagnosis of Hypothermia.  Performance deficits affecting function: weakness, impaired endurance, impaired self care skills, impaired functional mobility, gait instability, impaired balance, decreased lower extremity function, decreased safety awareness, decreased ROM, decreased upper extremity function, impaired fine motor. Patient was nonverbal throughout session and required total assistance for all mobility. All VSS throughout session. Patient would benefit from continued skilled acute OT 4x/wk to improve functional mobility, increase independence with ADLs, and address established goals. Recommending moderate intensity therapy once medically appropriate for discharge to increase maximal independence, reduce burden of care, and ensure safety.     Rehab Prognosis: Fair; patient would benefit from acute skilled OT services to address these deficits and reach maximum level of function.       Plan:     Patient to be seen 4 x/week to address the above listed problems via self-care/home management, therapeutic activities, therapeutic exercises  Plan of Care Expires: 04/12/24  Plan of Care Reviewed with: patient    Subjective     Chief Complaint: patient was nonverbal throughout session  Patient/Family Comments/goals: patient did not verbalized during session, but did cooperate and let therapy sit patient EOB.     Occupational Profile:  Living Environment:  Per pt daughter via phone as PT called: pt was modified Independent with rollator  prior to 11/23. Pt lives in 1 story with slab entrance and his son (works full-time) lives next door with adjourning door.   Prior to admission, patients level of function was modified Independent.  Equipment used at home: rollator, bedside commode, bath bench.  DME owned (not currently used): none.  Upon discharge, patient will have assistance from family.     Pain/Comfort:  Pain Rating 1: 0/10  Pain Rating Post-Intervention 1: 0/10    Patients cultural, spiritual, Orthodox conflicts given the current situation: no    Objective:     Communicated with: FERNANDO prior to session.  Patient found HOB elevated with telemetry, pulse ox (continuous), blood pressure cuff, bryant catheter (hep lock IV; B LE pressure relief boots) upon OT entry to room.    General Precautions: Standard, fall  Orthopedic Precautions: N/A  Braces: N/A  Respiratory Status: Room air    Occupational Performance:    Bed Mobility:    Patient completed Rolling/Turning to Left with  total assistance and 2 persons  Patient completed Scooting/Bridging with total assistance and 2 persons to HOB supine with bed in trendelenberg  Patient completed Supine to Sit with total assistance and 2 persons  Patient completed Sit to Supine with total assistance and 2 persons    Activities of Daily Living:  Grooming: total assistance washing face with wash cloth EOB  Lower Body Dressing: total assistance Donning and doffing socks    Cognitive/Visual Perceptual:  Patient oriented, but did not verbalize during treatment. Patient followed a one step command 2 times during session when therapist told patient to kick out with R LE to assess movement EOB and when told to do a thumbs up sitting EOB, patient attempt, but was unable to fully make a thumbs up with minimal digit movement at thumb joint.     Physical Exam:  Upper Extremity Range of Motion:     -       Right Upper Extremity: very limited (very rigid - unsure if it is tone or patient resisting when passively moving  UE)  -       Left Upper Extremity: very limited (very rigid - unsure if it is tone or patient resisting when passively moving UE)  Upper Extremity Strength: 0/5   Strength: Patient unable to make a grasp  Fine Motor Coordination: impaired    AMPAC 6 Click ADL:  AMPAC Total Score: 6    Treatment & Education:  Role of OT and POC  ADL retraining  Functional mobility training  Safety  Discharge planning  Importance EOB/OOB activity    Patient sat EOB x 5 min with total assistance. Tendency to lean backwards.    B UE PROM/stretching exercises in all available planes and joints including wrist and digits as able focusing to improve strength and endurance to increase independence with ADLs.    Co-treatment performed due to patient's multiple deficits requiring two skilled therapists to appropriately and safely assess patient's strength and endurance while facilitating functional tasks in addition to accommodating for patient's activity tolerance.     Patient left HOB elevated with all lines intact, call button in reach, nurse notified, and all needs met. (B LE pressure relief boots reapplied)    GOALS:   Multidisciplinary Problems       Occupational Therapy Goals          Problem: Occupational Therapy    Goal Priority Disciplines Outcome Interventions   Occupational Therapy Goal     OT, PT/OT Ongoing, Progressing    Description: Goals to be met by: 4/2/2024     Patient will increase functional independence with ADLs by performing:    UE Dressing with Moderate Assistance.  Grooming while seated with Minimal Assistance.   Sitting at edge of bed x8 minutes with Contact Guard Assistance.  Supine to sit with Moderate Assistance.  Sit<>stand transfers with Minimal Assistance.  Upper extremity exercise program 2x10 reps, with supervision.                         History:     Past Medical History:   Diagnosis Date    Acute on chronic systolic heart failure 12/13/2023    Anemia 06/04/2021    Anticoagulant long-term use      Basal ganglia hemorrhage     Left basal ganglia hemorrhage with resultant right-sided hemiparesis which has resolved.     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          Past Surgical History:   Procedure Laterality Date    CARPAL TUNNEL RELEASE Left 2/19/2020    Procedure: RELEASE, CARPAL TUNNEL LEFT;  Surgeon: Maria Luisa Mccurdy MD;  Location: [facility] OR;  Service: Orthopedics;  Laterality: Left;    COLONOSCOPY N/A 7/28/2023    Procedure: COLONOSCOPY;  Surgeon: Jaylene Alvarado MD;  Location: [facility] ENDO;  Service: Endoscopy;  Laterality: N/A;    EPIDURAL STEROID INJECTION N/A 5/2/2019    Procedure: Injection, Steroid, Epidural Cervical;  Surgeon: Dariel Doan Jr., MD;  Location: [facility] ENDO;  Service: Pain Management;  Laterality: N/A;  Cervical Epidural Steroid Injection     84303    Arrive @ 1130; ASA; Check BG    EPIDURAL STEROID INJECTION Bilateral 5/29/2019    Procedure: Lumbar Medial Branch Blocks;  Surgeon: Dariel Doan Jr., MD;  Location: [facility];  Service: Pain Management;  Laterality: Bilateral;  Bilateral Lumbar Medial Branch Blocks L3, L4, L5    [id number]  [id number]    Attive @ 1030 (11 arrival request); NO Sedation; ASA; Check BG    EPIDURAL STEROID INJECTION Bilateral 7/3/2019    Procedure: Lumbar Medial Branch Blocks;  Surgeon: Dariel Doan Jr., MD;  Location: [facility];  Service: Pain Management;  Laterality: Bilateral;  Bilateral  Lumbar Medial Branch Blocks L3, L4, L5    [id number]  [id number]    Arrive @ 0930; NO Sedation; ASA; Check BG    EPIDURAL STEROID INJECTION N/A 8/7/2019    Procedure: Injection, Steroid, Epidural Cervical;  Surgeon: Dariel Doan Jr., MD;  Location: [facility] ENDO;  Service: Pain Management;  Laterality: N/A;  Cervical Epidural Steroid Injection C7-T1    [id number]    Arrive @ 1115; Last ASA 7/30; Check BG    ESOPHAGOGASTRODUODENOSCOPY N/A 7/28/2023    Procedure: EGD (ESOPHAGOGASTRODUODENOSCOPY);  Surgeon: Jaylene Alvarado MD;  Location: [facility] ENDO;  Service: Endoscopy;  Laterality: N/A;  inst via portal    FUSION, SPINE, LUMBAR, TLIF, POSTERIOR APPROACH, USING PEDICLE SCREW N/A 12/15/2023    Procedure: L-1 TO L-4 FUSION, SPINE, LUMBAR, TLIF, POSTERIOR APPROACH, USING PEDICLE SCREW;  Surgeon: Rolf Rincon MD;  Location: [facility] OR [address] [place] [address];  Service: Neurosurgery;  Laterality: N/A;    LEFT HEART CATHETERIZATION Left 9/21/2021    Procedure: Left heart cath 9am start, R rad access;  Surgeon: Dariel Conner MD;  Location: [facility] CATH LAB;  Service: Cardiology;  Laterality: Left;  RN PRE OP Covid NEGATIVE ON  9-20-21.  C A    MIDLINE CATHETER PLACEMENT  12/12/2023    NO PAST SURGERIES         Time Tracking:     OT Date of Treatment: 03/12/24  OT Start Time: 1028  OT Stop Time: 1044  OT Total Time (min): 16 min    Billable Minutes:Evaluation 8  Therapeutic Exercise 8    3/12/2024

## 2024-03-12 NOTE — PT/OT/SLP EVAL
Physical Therapy Co- Evaluation and treatment with OT    Patient Name:  Bj Pate Jr.   MRN:  [id number]    Recommendations:     Discharge Recommendations: moderate intensity    Discharge Equipment Recommendations:  (will determine DME needs closer to discharge)   Barriers to discharge: Decreased caregiver support    Assessment:     Bj Pate Jr. is a 74 y.o. male admitted with a medical diagnosis of Hypothermia.  He presents with the following impairments/functional limitations: weakness, impaired balance, decreased safety awareness, impaired endurance, impaired functional mobility, decreased lower extremity function pt tolerated treatment well but is at a very low functional level. Pt was not able to actively participate in therapy, which will decrease progress. Pt was non-verbal during treatment.  Pt is significantly below previous functional level, increased risk of falls and increased burden of care currently. Patient currently demonstrates a need for moderate intensity therapy on a daily basis post acute secondary to a decline in functional status due to illness. Pt was fully functioning until 11/2023. Pt has been in acute care, LTAC and SNF since 11/23. Pt came to ED from SNF with AMS.    Rehab Prognosis: Fair; patient would benefit from acute skilled PT services to address these deficits and reach maximum level of function.    Recent Surgery: * No surgery found *      Plan:     During this hospitalization, patient to be seen 2 x/week to address the identified rehab impairments via therapeutic activities, therapeutic exercises, neuromuscular re-education and progress toward the following goals:    Plan of Care Expires:  04/10/24    Subjective     Chief Complaint: pt was non-verbal during treatment.   Patient/Family Comments/goals: pt unable to set goals and no family present to set goals.   Pain/Comfort:  Pain Rating 1: 0/10 (pt was non-verbal and no facial grimaces were noted)  Pain Rating  Post-Intervention 1: 0/10 (see above)    Patients cultural, spiritual, Religion conflicts given the current situation: no    Living Environment:  Per pt daughter via phone: pt was modified Independent with rollator prior to 11/23. Pt lives in 1 story with slab entrance and his son (works full-time) lives next door with adjourning door.   Prior to admission, patients level of function was modified Independent.  Equipment used at home: rollator, bedside commode, bath bench.  DME owned (not currently used): none.  Upon discharge, patient will have assistance from family .    Objective:     Communicated with nurse  prior to session.  Patient found L sidelying with telemetry, pulse ox (continuous), blood pressure cuff, bryant catheter (B karly Rasheeda boots, hep lock IV)  upon PT entry to room.    General Precautions: Standard, fall  Orthopedic Precautions:    Braces:    Respiratory Status: Room air    Exams:  Cognitive Exam:  Patient is oriented to pt did not verbalize during treatment.   RLE ROM: WFL  RLE Strength: no active motion noted pt had increased tone in hip extension.   LLE ROM: WFL  LLE Strength: no active motion noted.     Functional Mobility:  Bed Mobility:   pt needed verbal instructions for hand placement and sequencing for functional mobility.   Rolling Left:  total assistance and of 2 persons  Supine to Sit: total assistance and of 2 persons  Sit to Supine: total assistance and of 2 persons  Balance: pt sat on EOB x 5 min with total assist. Pt leaned backwards with sitting. PT provided tactile cues at the trunk to weight shift center of gravity over base of support for sitting balance.     Due to pt complex medical condition, the skill of 2 licensed therapists is needed to maximize treatment session and progression towards goals  Pt white board updated with current therapists name and level of mobility assistance needed.       AM-PAC 6 CLICK MOBILITY  Total Score:8       Treatment & Education:  Pt received  verbal instructions in role of PT and POC. Pt did not verbalize understanding and will need re-instruction.     Patient left right sidelying with all lines intact, call button in reach, and RN  notified.    GOALS:   Multidisciplinary Problems       Physical Therapy Goals          Problem: Physical Therapy    Goal Priority Disciplines Outcome Goal Variances Interventions   Physical Therapy Goal     PT, PT/OT Ongoing, Progressing     Description: Goals to be met by: 4/10/24     Patient will increase functional independence with mobility by performin. Supine to sit with Moderate Assistance  2. Sitting at edge of bed x10 minutes with Minimal Assistance to perform functional activities.   3. Lower extremity exercise program x10 reps per handout, with assistance as needed to increase strength for increased functional mobility.                          History:     Past Medical History:   Diagnosis Date    Acute on chronic systolic heart failure 2023    Anemia 2021    Anticoagulant long-term use     Basal ganglia hemorrhage     Left basal ganglia hemorrhage with resultant right-sided hemiparesis which has resolved.     Benign hypertension with CKD (chronic kidney disease) stage III      Cataract     Chronic idiopathic gout of multiple sites     Chronic kidney disease, stage 3     COPD (chronic obstructive pulmonary disease)     Erectile dysfunction     Gout     Hemorrhoids without complication     Hyperlipidemia     Morbid obesity     Obstructive sleep apnea on CPAP     Reactive airway disease without complication 2021    Severe sepsis 2023    Stroke ,     Thalamic infarct, acute (right) 2016    Type 2 DM with CKD stage 3 and hypertension     On pravastatin for cardiovascular protection.        Past Surgical History:   Procedure Laterality Date    CARPAL TUNNEL RELEASE Left 2020    Procedure: RELEASE, CARPAL TUNNEL LEFT;  Surgeon: Maria Luisa Mccurdy MD;  Location: [facility] OR;   Service: Orthopedics;  Laterality: Left;    COLONOSCOPY N/A 7/28/2023    Procedure: COLONOSCOPY;  Surgeon: Jaylene Alvarado MD;  Location: [facility] ENDO;  Service: Endoscopy;  Laterality: N/A;    EPIDURAL STEROID INJECTION N/A 5/2/2019    Procedure: Injection, Steroid, Epidural Cervical;  Surgeon: Dariel Doan Jr., MD;  Location: [facility] ENDO;  Service: Pain Management;  Laterality: N/A;  Cervical Epidural Steroid Injection     [id number]    Arrive @ 1130; ASA; Check BG    EPIDURAL STEROID INJECTION Bilateral 5/29/2019    Procedure: Lumbar Medial Branch Blocks;  Surgeon: Dariel Doan Jr., MD;  Location: [facility];  Service: Pain Management;  Laterality: Bilateral;  Bilateral Lumbar Medial Branch Blocks L3, L4, L5    07063  38307    Attive @ 1030 (11 arrival request); NO Sedation; ASA; Check BG    EPIDURAL STEROID INJECTION Bilateral 7/3/2019    Procedure: Lumbar Medial Branch Blocks;  Surgeon: Dariel Doan Jr., MD;  Location: [facility];  Service: Pain Management;  Laterality: Bilateral;  Bilateral Lumbar Medial Branch Blocks L3, L4, L5    75487  45990    Arrive @ 0930; NO Sedation; ASA; Check BG    EPIDURAL STEROID INJECTION N/A 8/7/2019    Procedure: Injection, Steroid, Epidural Cervical;  Surgeon: Dariel Doan Jr., MD;  Location: [facility];  Service: Pain Management;  Laterality: N/A;  Cervical Epidural Steroid Injection C7-T1    [id number]    Arrive @ 1115; Last ASA 7/30; Check BG    ESOPHAGOGASTRODUODENOSCOPY N/A 7/28/2023    Procedure: EGD (ESOPHAGOGASTRODUODENOSCOPY);  Surgeon: Jaylene Alvarado MD;  Location: [facility];  Service: Endoscopy;  Laterality: N/A;  inst via portal    FUSION, SPINE, LUMBAR, TLIF, POSTERIOR APPROACH, USING PEDICLE SCREW N/A 12/15/2023    Procedure: L-1 TO L-4 FUSION, SPINE, LUMBAR, TLIF, POSTERIOR APPROACH, USING PEDICLE SCREW;  Surgeon: Rolf Rincon MD;  Location: [facility] OR [address] [place] [address];  Service: Neurosurgery;  Laterality: N/A;    LEFT HEART CATHETERIZATION Left 9/21/2021     Procedure: Left heart cath 9am start, R rad access;  Surgeon: Dariel Conner MD;  Location: [facility] CATH LAB;  Service: Cardiology;  Laterality: Left;  RN PRE OP Covid NEGATIVE ON  9-20-21.  C A    MIDLINE CATHETER PLACEMENT  12/12/2023    NO PAST SURGERIES         Time Tracking:     PT Received On: 03/12/24  PT Start Time: 1027     PT Stop Time: 1043  PT Total Time (min): 16 min     Billable Minutes: Evaluation 8 min  and Neuromuscular Re-education 8 min       03/12/2024

## 2024-03-12 NOTE — ASSESSMENT & PLAN NOTE
-presents from NH with altered mentation, initially intubated for airway protection 3/4, extubated 3/10  -likely acute on chronic in the setting of history of CVA with likely low cognitive reserve at baseline, and exacerbated by UTI. Varying accounts of baseline makes evaluation difficult  -Per daughter: Has not been functionally independent since November (before December admission), currently lives in assisted living and requires assstance with ADLs. Mobility significantly impaird. AOx3 4-5 days out of the week, at other times will be confused and disoriented. 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

## 2024-03-12 NOTE — HOSPITAL COURSE
Admitted to the ED from SNF with acute encephalopathy. Initially admitted to hospital medicine however critical care consulted for severe hypothermia and hypotension.  Intubated 3/4 for airway protection, eventually extubated 3/10.  Workup notable for Citrobacter farmeri UTI, antibiotics tailored accordingly.  Neurology has been on board due to ongoing encephalopathy.  Recommending continued treatment of active medical issues.  Admission further complicated by heart block, likely Wenckebach for which EP was consulted. Likely indicative of periods of increased vagal tone or disordered sleep breathing, no evidnece of high degree block recorded, no indication for PPM. In addition, has been relatively intolerant of p.o. intake, NG tube has been placed with tube feeds started. Failed MBSS. Neurology following, deferred LP for repeat spinal imaging. Quality limited by motion artifact. Patient's mental status near baseline by 3/15. Cleared for PO by SLP on 3/18. NG tube to be removed. Patient with unclear history of urinary retention and chronic bryant. Bryant catheter removed AM 3/18, passed voiding trial. Diarrhea reported by nursing 3/15, c diff positive, started on oral vanc. Last episode of diarrhea documented on 3/16. Per patient's SNF, patient must be off contact precautions before returning. Patient to remain inpatient until completion of treatment for c diff on 3/26 when contact precautions can be discontinued as per hospital protocol.

## 2024-03-12 NOTE — ASSESSMENT & PLAN NOTE
UA following admission concerning for UTI. Urine cultures growing Citrobacter farmerii. Patient now s/p 7 days zosyn.    - maintaining bryant catheter

## 2024-03-12 NOTE — RESIDENT HANDOFF
Handoff     Primary Team: Networked reference to record [facility]  Room Number: 6091/6091 A     Patient Name: Bj Pate Jr. MRN: [id number]     Date of Birth: [id number] Allergies: Tomato (solanum lycopersicum), Naproxen, and Shrimp     Age: 74 y.o. Admit Date: 3/4/2024     Sex: male  BMI: Body mass index is 36.63 kg/m².     Code Status: Full Code        Illness Level (current clinical status): Watcher - No    Reason for Admission: Hypothermia    Brief HPI: 73M with PMH CVA with R sided deficits, DM, HTN who presents to the ED from SNF for AMS. Per chart, he was more somnolent than usual over the last 2 days. At baseline he is verbal and follows commands but does not ambulate. On arrival to ED he was hypothermic to 87.9F and found to be hypoglycemic to the 50s. Intermittently hypotensive to MAP 55. Patient is awake but does not interact meaningfully on exam. Chronic Rivera was initially draining cloudy urine with sediment, Rivera exchanged. Initial labs without leukocytosis, CMP unremarkable, VBG with pH 7.49/30.3/38.9, lactate normal. Received 1L NS bolus through warming pump. Initially admitted to , critical care consulted for severe hypothermia with hypotension.        Hospital Course: Patient intubated 3/4 for airway protection. Sedated and requiring Levophed. Urine with Citrobacter, abx narrowed accordingly. Developed AV block, EP consulted and cordis CVC placed. TVP deemed unnecessary given no evidence of 3rd degree heart block. Extubated to room air 3/10. Neurology consulted for evaluation of other causes of encephalopathy. Patient failed modified barium swallow study, NG tube placed on 3/12.    Tasks:   - consider LP under fluoro if mentation worsening to rule out discitis as possible etiology of encephalopathy  - NG tube placement for initiation of tube feeds. Follow up with SLP for repeat modified barium swallow as mentation improves  - If patient to remain NPO for long term, consider PEG tube placement  - Chronic  bryant in place, monitor for retention, repeat infections  - Continue delirium precautions    Contingency Plan: If worsening encephalopathy and patient unable to maintain airway, re-consult MICU

## 2024-03-12 NOTE — PLAN OF CARE
MICU DAILY GOALS     Family/Goals of care/Code Status   Code Status: Full Code    24H Vital Sign Range  Temp:  [98.2 °F (36.8 °C)-99.1 °F (37.3 °C)]   Pulse:  []   Resp:  [15-34]   BP: ()/(55-80)   SpO2:  [96 %-100 %]   Arterial Line BP: (123-154)/()      Shift Events (include procedures and significant events)   No acute events throughout shift, lytes replaced. MD notified that pt has had decreased urine output over the past shift, IVF bolus ordered.    AWAKE RASS: Goal - RASS Goal: 0-->alert and calm  Actual - RASS (Anderson Agitation-Sedation Scale): alert and calm    Restraint necessity: Not necessary   BREATHE SBT: Not intubated    Coordinate A & B, analgesics/sedatives Pain: managed   SAT: Not intubated   Delirium CAM-ICU: Overall CAM-ICU: Positive   Early(intubated/ Progressive (non-intubated) Mobility MOVE Screen (INTUBATED ONLY): Pass    Activity: Activity Management: Rolling - L1   Feeding/Nutrition Diet order: Diet/Nutrition Received: NPO,     Thrombus DVT prophylaxis: VTE Required Core Measure: Pharmacological prophylaxis initiated/maintained   HOB Elevation Head of Bed (HOB) Positioning: HOB at 30 degrees   Ulcer Prophylaxis GI: no   Glucose control managed Glycemic Management: blood glucose monitored   Skin Skin assessed during: Q Shift Change    Sacrum intact/not altered? No  Heels intact/not altered? Yes  Surgical wound? No    CHECK ONE!   (no altered skin or altered skin) and sub boxes:  [] No Altered Skin Integrity Present    []Prevention Measures Documented    [x] Altered Skin Integrity Present or Discovered   [x] LDA present in EPIC, daily doc completed              [] LDA added if not in EPIC (describe wound).                    When describing wound, do not stage, use descriptive words only.    [] Wound Image Taken (required on admit,                   transfer/discharge and every Tuesday)    Wound Care Consulted? No    Attending Nurse:     Second RN/Staff Member:    Bowel  Function no issues    Indwelling Catheter Necessity      Urethral Catheter 03/04/24 0249 Silicone 16 Fr.-Reason for Continuing Urinary Catheterization: Critically ill in ICU and requiring hourly monitoring of intake/output    [REMOVED] Introducer 03/06/24 0454 Internal Jugular Right-Line Necessity Review: Hemodynamic instability     De-escalation Antibiotics Yes        VS and assessment per flow sheet, patient progressing towards goals as tolerated, plan of care reviewed with  Bj Pate , all concerns addressed, will continue to monitor.

## 2024-03-12 NOTE — PLAN OF CARE
Hospital Medicine ICU Acceptance Note    Date of Admit: 3/4/2024  Date of Transfer / Stepdown: 3/12/2024  Botip, C/J, L, Onc (IV chemo w/in 1 month), Gyn/Onc, or other special case?: No  ICU team stepping patient down: MICU  ICU team member giving handoff: Shelbie Souza MD   Accepting  team: B    History of Present Illness:     73M with PMH CVA with R sided deficits, DM, HTN who presents to the ED from SNF for AMS. Per chart, he was more somnolent than usual over the last 2 days. At baseline he is verbal and follows commands but does not ambulate. On arrival to ED he was hypothermic to 87.9F and found to be hypoglycemic to the 50s. Intermittently hypotensive to MAP 55. Patient is awake but does not interact meaningfully on exam. Chronic Rivera was initially draining cloudy urine with sediment, Rivera exchanged. Initial labs without leukocytosis, CMP unremarkable, VBG with pH 7.49/30.3/38.9, lactate normal. Received 1L NS bolus through warming pump. Initially admitted to , critical care consulted for severe hypothermia with hypotension    Hospital/ICU Course:     Admitted to the ED from SNF with acute encephalopathy. Initially admitted to hospital medicine however critical care consulted for severe hypothermia and hypotension.  Intubated 3/4 for airway protection, eventually extubated 3/10.  Workup notable for Citrobacter farmeri UTI, antibiotics tailored accordingly.  Neurology has been on board due to ongoing encephalopathy.  Recommending continued treatment of active medical issues.  Admission further complicated by heart block, likely Wenckebach for which EP was consulted. Likely indicative of periods of increased vagal tone or disordered sleep breathing, no evidnece of high degree block recorded, no indication for PPM. In addition, has been relatively intolerant of p.o. intake, NG tube has been placed with tube feeds started.  Failed MBSS. May possibly consider PEG if not improved    Deemed appropriate  for transfer to the floor on 3/12/2024, and was accepted to  team B for further care and management.    Consultants and Procedures:     Consultants:  Consults (From admission, onward)          Status Ordering Provider     Inpatient consult to Midline team  Once        Provider:  (Not yet assigned)    Completed ANILA NAIR     Inpatient consult to Neurology  Once        Provider:  (Not yet assigned)    Completed KRISTOFER MIGUEL     Inpatient consult to Electrophysiology  Once        Provider:  (Not yet assigned)    Completed ONESIMO LINDSEY     Inpatient consult to Registered Dietitian/Nutritionist  Once        Provider:  (Not yet assigned)    Completed DOUGLAS OLVERA     Inpatient consult to Critical Care Medicine  Once        Provider:  (Not yet assigned)    Completed SHAHBAZ TAYLOR            Procedures:    As documented    Transfer Information:     Diet:  As ordered    Physical Activity:  As tolerated      Pending plan at time of transfer to the floor:  Continue current plan initiated by ICU, will further monitor and adjust as clinically indicated upon arrival to the floor.    Patient has been accepted by [facility] Medicine Team B, who will assume care of the patient upon arrival to the floor from the ICU. Please contact ICU team with any concerns prior to transfer to the floor.      Akash Elias DO  03/12/2024

## 2024-03-12 NOTE — PLAN OF CARE
Modified Barium swallow study completed. Study limited due to pt's alertness and decreased oral acceptance. Pt can have pureed diet and thin liquids for pleasure when awake/alert but pt unable to obtain enough nutrition via po alone. Feel pt may progress as alertness/ participation improves.

## 2024-03-12 NOTE — ASSESSMENT & PLAN NOTE
-Concern for CHB overnight 3/6  -EP: Likely indicative of periods of increased vagal tone or disordered sleep breathing, no evidence of high-degree block noted therefore no indication for PPM/transcutaneous pacing

## 2024-03-12 NOTE — PLAN OF CARE
Problem: Physical Therapy  Goal: Physical Therapy Goal  Description: Goals to be met by: 4/10/24     Patient will increase functional independence with mobility by performin. Supine to sit with Moderate Assistance  2. Sitting at edge of bed x10 minutes with Minimal Assistance to perform functional activities.   3. Lower extremity exercise program x10 reps per handout, with assistance as needed to increase strength for increased functional mobility.     Outcome: Ongoing, Progressing   Evaluation completed and goals appropriate. 3/12/2024

## 2024-03-12 NOTE — ASSESSMENT & PLAN NOTE
SLP on board, recommending strict NPO  Continue tube feeds for now via NG tube, nutrition on board  As above, consider PEG tube if not improving and alligns with GOC

## 2024-03-13 LAB
ALBUMIN SERPL BCP-MCNC: 1.5 G/DL (ref 3.5–5.2)
ALP SERPL-CCNC: 89 U/L (ref 55–135)
ALT SERPL W/O P-5'-P-CCNC: 9 U/L (ref 10–44)
ANION GAP SERPL CALC-SCNC: 9 MMOL/L (ref 8–16)
AST SERPL-CCNC: 20 U/L (ref 10–40)
BASOPHILS # BLD AUTO: 0.01 K/UL (ref 0–0.2)
BASOPHILS NFR BLD: 0.1 % (ref 0–1.9)
BILIRUB SERPL-MCNC: 0.4 MG/DL (ref 0.1–1)
BUN SERPL-MCNC: 14 MG/DL (ref 8–23)
CALCIUM SERPL-MCNC: 9.3 MG/DL (ref 8.7–10.5)
CHLORIDE SERPL-SCNC: 102 MMOL/L (ref 95–110)
CO2 SERPL-SCNC: 22 MMOL/L (ref 23–29)
CREAT SERPL-MCNC: 1 MG/DL (ref 0.5–1.4)
DIFFERENTIAL METHOD BLD: ABNORMAL
EOSINOPHIL # BLD AUTO: 0 K/UL (ref 0–0.5)
EOSINOPHIL NFR BLD: 0.5 % (ref 0–8)
ERYTHROCYTE [DISTWIDTH] IN BLOOD BY AUTOMATED COUNT: 17.5 % (ref 11.5–14.5)
EST. GFR  (NO RACE VARIABLE): >60 ML/MIN/1.73 M^2
GLUCOSE SERPL-MCNC: 81 MG/DL (ref 70–110)
HCT VFR BLD AUTO: 23 % (ref 40–54)
HGB BLD-MCNC: 7.3 G/DL (ref 14–18)
IMM GRANULOCYTES # BLD AUTO: 0.1 K/UL (ref 0–0.04)
IMM GRANULOCYTES NFR BLD AUTO: 1.4 % (ref 0–0.5)
LYMPHOCYTES # BLD AUTO: 0.8 K/UL (ref 1–4.8)
LYMPHOCYTES NFR BLD: 11.1 % (ref 18–48)
MAGNESIUM SERPL-MCNC: 1.8 MG/DL (ref 1.6–2.6)
MCH RBC QN AUTO: 27.1 PG (ref 27–31)
MCHC RBC AUTO-ENTMCNC: 31.7 G/DL (ref 32–36)
MCV RBC AUTO: 86 FL (ref 82–98)
MONOCYTES # BLD AUTO: 0.9 K/UL (ref 0.3–1)
MONOCYTES NFR BLD: 11.9 % (ref 4–15)
NEUTROPHILS # BLD AUTO: 5.5 K/UL (ref 1.8–7.7)
NEUTROPHILS NFR BLD: 75 % (ref 38–73)
NRBC BLD-RTO: 0 /100 WBC
PHOSPHATE SERPL-MCNC: 3.5 MG/DL (ref 2.7–4.5)
PLATELET # BLD AUTO: 349 K/UL (ref 150–450)
PMV BLD AUTO: 10.5 FL (ref 9.2–12.9)
POCT GLUCOSE: 102 MG/DL (ref 70–110)
POCT GLUCOSE: 107 MG/DL (ref 70–110)
POCT GLUCOSE: 83 MG/DL (ref 70–110)
POCT GLUCOSE: 90 MG/DL (ref 70–110)
POCT GLUCOSE: 92 MG/DL (ref 70–110)
POCT GLUCOSE: 93 MG/DL (ref 70–110)
POTASSIUM SERPL-SCNC: 3.2 MMOL/L (ref 3.5–5.1)
PROT SERPL-MCNC: 6.6 G/DL (ref 6–8.4)
RBC # BLD AUTO: 2.69 M/UL (ref 4.6–6.2)
SODIUM SERPL-SCNC: 133 MMOL/L (ref 136–145)
WBC # BLD AUTO: 7.32 K/UL (ref 3.9–12.7)

## 2024-03-13 PROCEDURE — S5010 5% DEXTROSE AND 0.45% SALINE: HCPCS | Performed by: INTERNAL MEDICINE

## 2024-03-13 PROCEDURE — 25000003 PHARM REV CODE 250: Performed by: INTERNAL MEDICINE

## 2024-03-13 PROCEDURE — 63600175 PHARM REV CODE 636 W HCPCS

## 2024-03-13 PROCEDURE — 36415 COLL VENOUS BLD VENIPUNCTURE: CPT

## 2024-03-13 PROCEDURE — 85025 COMPLETE CBC W/AUTO DIFF WBC: CPT

## 2024-03-13 PROCEDURE — 80053 COMPREHEN METABOLIC PANEL: CPT

## 2024-03-13 PROCEDURE — 83735 ASSAY OF MAGNESIUM: CPT

## 2024-03-13 PROCEDURE — 20600001 HC STEP DOWN PRIVATE ROOM

## 2024-03-13 PROCEDURE — 25000003 PHARM REV CODE 250: Performed by: STUDENT IN AN ORGANIZED HEALTH CARE EDUCATION/TRAINING PROGRAM

## 2024-03-13 PROCEDURE — 84100 ASSAY OF PHOSPHORUS: CPT

## 2024-03-13 PROCEDURE — 25000003 PHARM REV CODE 250

## 2024-03-13 RX ORDER — SODIUM,POTASSIUM PHOSPHATES 280-250MG
2 POWDER IN PACKET (EA) ORAL ONCE
Status: COMPLETED | OUTPATIENT
Start: 2024-03-13 | End: 2024-03-13

## 2024-03-13 RX ADMIN — ATORVASTATIN CALCIUM 40 MG: 40 TABLET, FILM COATED ORAL at 09:03

## 2024-03-13 RX ADMIN — DEXTROSE AND SODIUM CHLORIDE: 5; 450 INJECTION, SOLUTION INTRAVENOUS at 04:03

## 2024-03-13 RX ADMIN — MICONAZOLE NITRATE 2 % TOPICAL POWDER: at 10:03

## 2024-03-13 RX ADMIN — SILODOSIN 4 MG: 4 CAPSULE ORAL at 09:03

## 2024-03-13 RX ADMIN — POTASSIUM & SODIUM PHOSPHATES POWDER PACK 280-160-250 MG 2 PACKET: 280-160-250 PACK at 09:03

## 2024-03-13 RX ADMIN — ENOXAPARIN SODIUM 40 MG: 40 INJECTION SUBCUTANEOUS at 05:03

## 2024-03-13 RX ADMIN — MICONAZOLE NITRATE 2 % TOPICAL POWDER: at 09:03

## 2024-03-13 NOTE — PT/OT/SLP PROGRESS
Speech Language Pathology      Bj Pate Jr.  MRN: [id number]    Patient not seen today though continues to benefit from ongoing skilled speech services. Will continue to proceed with most recent recommendations from instrumental assessment completed yesterday as follows. Ongoing GOC discussions with pt and medical team remain necessary regarding long term means of alternative hydration and nutrition given recent recommendations for NPO status. SLP to continue to follow.       Recommendations:      Recommendations:                General Recommendations:  Dysphagia therapy  Diet recommendations:  NPO, NPO  Small sips/bites of  puree and thin liquids for pleasure when awake/alert with aspiration precautions  Aspiration Precautions: 1 bite/sip at a time, Alternating bites/sips, Assistance with meals, Check for pocketing/oral residue, Eliminate distractions, Feed only when awake/alert, Puree for pleasure, Small bites/sips, and Strict aspiration precautions   General Precautions: Standard, aspiration, fall, NPO  Communication strategies:  yes/no questions only, provide increased time to answer, and go to room if call light pushed      3/13/2024

## 2024-03-13 NOTE — PROGRESS NOTES
[facility] - Trans[facility] Medicine  Progress Note    Patient Name: Bj Pate Jr.  MRN: [id number]  Patient Class: IP- Inpatient   Admission Date: 3/4/2024  Length of Stay: 9 days  Attending Physician: Akash Elias DO  Primary Care Provider: Jose Antonio Foster MD        Subjective:     Principal Problem:Acute encephalopathy        HPI:  73M with PMH CVA with R sided deficits, DM, HTN who presents to the ED from SNF for AMS. Per chart, he was more somnolent than usual over the last 2 days. At baseline he is verbal and follows commands but does not ambulate. On arrival to ED he was hypothermic to 87.9F and found to be hypoglycemic to the 50s. Intermittently hypotensive to MAP 55. Patient is awake but does not interact meaningfully on exam. Chronic Rivera was initially draining cloudy urine with sediment, Rivera exchanged. Initial labs without leukocytosis, CMP unremarkable, VBG with pH 7.49/30.3/38.9, lactate normal. Received 1L NS bolus through warming pump. Initially admitted to , critical care consulted for severe hypothermia with hypotension.    Overview/Hospital Course:  Admitted to the ED from SNF with acute encephalopathy. Initially admitted to hospital medicine however critical care consulted for severe hypothermia and hypotension.  Intubated 3/4 for airway protection, eventually extubated 3/10.  Workup notable for Citrobacter farmeri UTI, antibiotics tailored accordingly.  Neurology has been on board due to ongoing encephalopathy.  Recommending continued treatment of active medical issues.  Admission further complicated by heart block, likely Wenckebach for which EP was consulted. Likely indicative of periods of increased vagal tone or disordered sleep breathing, no evidnece of high degree block recorded, no indication for PPM. In addition, has been relatively intolerant of p.o. intake, NG tube has been placed with tube feeds started.  Failed MBSS. May possibly consider PEG if not  improved    Interval History: Seen this AM at bedside. Somnolent at bedside. Per RN, stated that he was not in pain this morning when he was slightly more awake     Review of Systems  Objective:     Vital Signs (Most Recent):  Temp: 99.7 °F (37.6 °C) (03/13/24 1200)  Pulse: 90 (03/13/24 1200)  Resp: 18 (03/13/24 1200)  BP: (!) 114/56 (03/13/24 1200)  SpO2: 96 % (03/13/24 1200) Vital Signs (24h Range):  Temp:  [98.4 °F (36.9 °C)-100 °F (37.8 °C)] 99.7 °F (37.6 °C)  Pulse:  [] 90  Resp:  [15-45] 18  SpO2:  [95 %-99 %] 96 %  BP: (108-132)/(56-84) 114/56     Weight: 109.5 kg (241 lb 6.5 oz)  Body mass index is 35.65 kg/m².    Intake/Output Summary (Last 24 hours) at 3/13/2024 1259  Last data filed at 3/13/2024 1200  Gross per 24 hour   Intake 878.34 ml   Output 335 ml   Net 543.34 ml         Physical Exam  Vitals reviewed.   Constitutional:       General: He is not in acute distress.     Appearance: He is not ill-appearing.      Comments: Somnolent   HENT:      Head: Normocephalic and atraumatic.   Eyes:      Pupils: Pupils are equal, round, and reactive to light.   Cardiovascular:      Rate and Rhythm: Normal rate and regular rhythm.   Pulmonary:      Effort: Pulmonary effort is normal.      Breath sounds: Normal breath sounds.   Abdominal:      General: Bowel sounds are normal.      Palpations: Abdomen is soft.      Tenderness: There is no abdominal tenderness.   Musculoskeletal:      Right lower leg: No edema.      Left lower leg: No edema.      Comments: Bilateral upper extremity contractures   Skin:     General: Skin is warm.   Neurological:      Mental Status: He is disoriented.   Psychiatric:         Speech: He is noncommunicative.             Significant Labs: All pertinent labs within the past 24 hours have been reviewed.    Significant Imaging: I have reviewed all pertinent imaging results/findings within the past 24 hours.    Assessment/Plan:      * Acute encephalopathy  -presents from NH with altered  "mentation, initially intubated for airway protection 3/4, extubated 3/10  -likely acute on chronic in the setting of history of CVA with likely low cognitive reserve at baseline, and exacerbated by UTI. Varying accounts of baseline makes evaluation difficult  -Per daughter: Has not been functionally independent since November (before December admission), currently lives in assisted living and requires assstance with ADLs. Mobility significantly impaird. AOx3 4-5 days out of the week, at other times will be confused and disoriented. 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  -CTH on admission unremarkable for acute process  -MRI spine obtained on 3/10 showing chronic dicitis  Plan:  -Status post treatment for UTI  -Neurology on board, expect extended return to baseline given significant infection in the setting of fairly low cognitive reserve, continue treatment of active medical issues  -Contacted neuro today to discuss LP/further workup since patient not near baseline  -NG tube placed, may consider PEG if aligns with GOC        Urinary tract infection without hematuria  -now s/p 7 days zosyn  -maintain bryant for now      Second degree AV block, Mobitz type I  -Concern for CHB overnight 3/6  -EP: Likely indicative of periods of increased vagal tone or disordered sleep breathing, no evidence of high-degree block noted therefore no indication for PPM/transcutaneous pacing        Dysphagia  SLP on board, recommending strict NPO  Continue tube feeds for now via NG tube, nutrition on board  As above, consider PEG tube if not improving and alligns with GOC      Sepsis  This patient does have evidence of infective focus  My overall impression is sepsis.  Source: Urinary Tract  Antibiotics given-   Antibiotics (72h ago, onward)      None          Latest lactate reviewed-  No results for input(s): "LACTATE", "POCLAC" in the last 72 hours.  Organ dysfunction " indicated by Encephalopathy    Fluid challenge Not needed - patient is not hypotensive      Post- resuscitation assessment No - Post resuscitation assessment not needed       Will Not start Pressors- Levophed for MAP of 65  Source control achieved by: antibiotics    Hypothermia  Resolved      Hyponatremia  Patient has hyponatremia which is controlled,We will aim to correct the sodium by 4-6mEq in 24 hours. We will monitor sodium Daily. The hyponatremia is due to Dehydration/hypovolemia. The patient's sodium results have been reviewed and are listed below.  Recent Labs   Lab 03/13/24  0554   *       History of CVA (cerebrovascular accident)  History of R sided hemiparesis from prior CVA        VTE Risk Mitigation (From admission, onward)           Ordered     enoxaparin injection 40 mg  Every 24 hours         03/12/24 1008     IP VTE HIGH RISK PATIENT  Once         03/04/24 0316     Place sequential compression device  Until discontinued         03/04/24 0316                    Discharge Planning   GLORIA: 3/19/2024     Code Status: Full Code   Is the patient medically ready for discharge?:     Reason for patient still in hospital (select all that apply): Patient trending condition  Discharge Plan A: Skilled Nursing Facility (return to [facility] unit)   Discharge Delays: None known at this time              Akash Elias DO  Department of Hospital Medicine   [facility] - Transplant Stepdown

## 2024-03-13 NOTE — PLAN OF CARE
Pt is a 74yr old male admitted on 3/4 for acute encephalopathy and altered mental status.   Pt arrived on unit @midnight from ICU. Pt Aox1 (self only) w/ incoherent speech.    Currently has  D5 &0.45%NS infusing @50mlhr to rgt FA.   Pt has NG tube to the rgt nare w/tube feedings infusing @10cc/hr (Isosource) formula.   Arrived w/ bryant in place UOP 200ml.  Pt is bedbound - 2x person assist.   Accuchecks Q4 - 04:00- 103 (no sliding scale coverage per orders).  Sacral wound to sacrum area, wound consult ordered.  Bed in lowest position, wheels locked, heel boots on for heel protection, Q2 turns completed since arrival, call light within reach. Care is ongoing and progressing.

## 2024-03-13 NOTE — NURSING
Nurses Note -- 4 Eyes      3/13/2024   3:20 AM      Skin assessed during: Transfer      [] No Altered Skin Integrity Present    []Prevention Measures Documented      [x] Yes- Altered Skin Integrity Present or Discovered   [] LDA Added if Not in Epic (Describe Wound)   [] New Altered Skin Integrity was Present on Admit and Documented in LDA   [] Wound Image Taken    Wound Care Consulted? Yes    Attending Nurse:  Moon Lam RN/Staff Member:  Tiffanie

## 2024-03-13 NOTE — CONSULTS
[facility] - Transplant Stepdown  Wound Care    Patient Name:  Bj Pate Jr.   MRN:  [id number]  Date: 3/13/2024  Diagnosis: Acute encephalopathy    History:     Past Medical History:   Diagnosis Date    Acute on chronic systolic heart failure 12/13/2023    Anemia 06/04/2021    Anticoagulant long-term use     Basal ganglia hemorrhage     Left basal ganglia hemorrhage with resultant right-sided hemiparesis which has resolved.     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        Social History     Socioeconomic History    Marital status:     Number of children: 8    Highest education level: 11th grade   Occupational History    Occupation:    Tobacco Use    Smoking status: Never    Smokeless tobacco: Never   Substance and Sexual Activity    Alcohol use: Yes     Alcohol/week: 0.0 standard drinks of alcohol     Comment: occacionally    Drug use: No    Sexual activity: Not Currently     Social Determinants of Health     Financial Resource Strain: Low Risk  (3/5/2024)    Overall Financial Resource Strain (CARDIA)     Difficulty of Paying Living Expenses: Not very hard   Food Insecurity: No Food Insecurity (3/5/2024)    Hunger Vital Sign     Worried About Running Out of Food in the Last Year: Never true     Ran Out of Food in the Last Year: Never true   Transportation Needs: No Transportation Needs (3/5/2024)    PRAPARE - Transportation     Lack of  Transportation (Medical): No     Lack of Transportation (Non-Medical): No   Physical Activity: Inactive (3/5/2024)    Exercise Vital Sign     Days of Exercise per Week: 0 days     Minutes of Exercise per Session: 0 min   Stress: Patient Declined (12/12/2023)    [employer] - Occupational Stress Questionnaire     Feeling of Stress : Patient declined   Social Connections: Unknown (3/5/2024)    Social Connection and Isolation Panel [NHANES]     Frequency of Communication with Friends and Family: More than three times a week     Frequency of Social Gatherings with Friends and Family: More than three times a week     Attends Taoism Services: Patient declined     Active Member of Clubs or Organizations: Patient declined     Attends Club or Organization Meetings: Patient declined   Housing Stability: Unknown (3/5/2024)    Housing Stability Vital Sign     Unable to Pay for Housing in the Last Year: No     Unstable Housing in the Last Year: No       Precautions:     Allergies as of 03/04/2024 - Reviewed 03/04/2024   Allergen Reaction Noted    Tomato (solanum lycopersicum) Hives 09/26/2013    Naproxen Hives 10/22/2012    Shrimp Other (See Comments) 03/07/2017       [facility] Assessment Details/Treatment     Patient seen for wound care consultation.   Reviewed chart for this encounter.   See Flow Sheet for findings.      RECOMMENDATIONS:Primary RN at bedside and agreeable to inpatient wound care. Patient followed by inpatient wound care prior to being stepped down from ICU for sacrum. Upon assessment, moisture associated breakdown of sacrum. Cleanse sacrum with sterile normal saline and pat dry. Apply triad BID and PRN for moisture barrier. No other issues or concerns at this time. Will follow up 3/20/2024 or sooner if needed.    Discussed POC with patient and primary nurse.   See EMR for orders & patient education.    Discussed nutrition and the role of protein in wound healing with the patient.  Instructed patient to optimize protein for wound healing.    Bedside nursing to continue care & monitoring.  Bedside nursing to maintain pressure injury prevention interventions.            03/13/24 0850   WOCN Assessment   WOCN Total Time (mins) 45   Visit Date 03/13/24   Visit Time 0850   Consult Type New   WOCN Speciality Wound   Intervention assessed;changed;applied;chart review;orders   Teaching on-going        Altered Skin Integrity 03/04/24 0215 midline Sacral spine #1 Partial thickness tissue loss. Shallow open ulcer with a red or pink wound bed, without slough. Intact or Open/Ruptured Serum-filled blister.   Date First Assessed/Time First Assessed: 03/04/24 0215   Altered Skin Integrity Present on Admission - Did Patient arrive to the hospital with altered skin?: yes  Orientation: midline  Location: Sacral spine  Wound Number: #1  Description of Altered S...   Wound Image    Dressing Appearance Open to air;Dry;Intact   Drainage Amount None   Drainage Characteristics/Odor No odor   Appearance Pink;Red   Care Cleansed with:;Sterile normal saline       Orders placed.   Humberto Echeverria RN  03/13/2024

## 2024-03-13 NOTE — ASSESSMENT & PLAN NOTE
Patient has hyponatremia which is controlled,We will aim to correct the sodium by 4-6mEq in 24 hours. We will monitor sodium Daily. The hyponatremia is due to Dehydration/hypovolemia. The patient's sodium results have been reviewed and are listed below.  Recent Labs   Lab 03/13/24  0554   *

## 2024-03-13 NOTE — PLAN OF CARE
Pt with NGT, GLORIA 6 days, will need SNF at dc, Sw sent 6 referrals on the [facility] side and also to OSNF, will follow with acceptance and stability. Pt is from [facility] and per GARRETT Dacosta, will return, will need PEG or be able to swallow prior to dc.

## 2024-03-13 NOTE — CARE UPDATE
RAPID RESPONSE NURSE ROUND       Rounding completed with charge RN, Shimon for , reports pt has been transferred to SICU for further care. No additional concerns verbalized at this time. Instructed to call [phone] for further concerns or assistance.

## 2024-03-13 NOTE — SUBJECTIVE & OBJECTIVE
Interval History: Seen this AM at bedside. Somnolent at bedside. Per RN, stated that he was not in pain this morning when he was slightly more awake     Review of Systems  Objective:     Vital Signs (Most Recent):  Temp: 99.7 °F (37.6 °C) (03/13/24 1200)  Pulse: 90 (03/13/24 1200)  Resp: 18 (03/13/24 1200)  BP: (!) 114/56 (03/13/24 1200)  SpO2: 96 % (03/13/24 1200) Vital Signs (24h Range):  Temp:  [98.4 °F (36.9 °C)-100 °F (37.8 °C)] 99.7 °F (37.6 °C)  Pulse:  [] 90  Resp:  [15-45] 18  SpO2:  [95 %-99 %] 96 %  BP: (108-132)/(56-84) 114/56     Weight: 109.5 kg (241 lb 6.5 oz)  Body mass index is 35.65 kg/m².    Intake/Output Summary (Last 24 hours) at 3/13/2024 1259  Last data filed at 3/13/2024 1200  Gross per 24 hour   Intake 878.34 ml   Output 335 ml   Net 543.34 ml         Physical Exam  Vitals reviewed.   Constitutional:       General: He is not in acute distress.     Appearance: He is not ill-appearing.      Comments: Somnolent   HENT:      Head: Normocephalic and atraumatic.   Eyes:      Pupils: Pupils are equal, round, and reactive to light.   Cardiovascular:      Rate and Rhythm: Normal rate and regular rhythm.   Pulmonary:      Effort: Pulmonary effort is normal.      Breath sounds: Normal breath sounds.   Abdominal:      General: Bowel sounds are normal.      Palpations: Abdomen is soft.      Tenderness: There is no abdominal tenderness.   Musculoskeletal:      Right lower leg: No edema.      Left lower leg: No edema.      Comments: Bilateral upper extremity contractures   Skin:     General: Skin is warm.   Neurological:      Mental Status: He is disoriented.   Psychiatric:         Speech: He is noncommunicative.             Significant Labs: All pertinent labs within the past 24 hours have been reviewed.    Significant Imaging: I have reviewed all pertinent imaging results/findings within the past 24 hours.

## 2024-03-13 NOTE — PLAN OF CARE
Patient has been sleeping for most of the day. Patient does wake up when you walk in the room. Patient replies yes or no to questions. Patient's son at the bedside this evening and is having normal conversation with his family. Patient remains NPO at this time. TF infusing through a  right nare NGT at 10cc/hr, which is his goal. No residuals noted. Patient seen by wound care this morning. Patient has multiple breakdown on his back, sacrum, and the back of his thighs. Areas clean with NS and triad applied per wound care orders. Patient is NSR on telemetry. Rivera catheter CDI with concentrated yellow urine noted. Monitoring the patient's blood sugar AC&HS. Patient has a wet cough and is unable to cough any sputum, CPT ordered QID. IVF stopped. Bed exit alarm is in use.

## 2024-03-14 LAB
ABO + RH BLD: NORMAL
ALBUMIN SERPL BCP-MCNC: 1.5 G/DL (ref 3.5–5.2)
ALP SERPL-CCNC: 101 U/L (ref 55–135)
ALT SERPL W/O P-5'-P-CCNC: 16 U/L (ref 10–44)
ANION GAP SERPL CALC-SCNC: 6 MMOL/L (ref 8–16)
AST SERPL-CCNC: 31 U/L (ref 10–40)
BASOPHILS # BLD AUTO: 0.02 K/UL (ref 0–0.2)
BASOPHILS NFR BLD: 0.2 % (ref 0–1.9)
BILIRUB SERPL-MCNC: 0.5 MG/DL (ref 0.1–1)
BLD GP AB SCN CELLS X3 SERPL QL: NORMAL
BUN SERPL-MCNC: 13 MG/DL (ref 8–23)
CALCIUM SERPL-MCNC: 8.6 MG/DL (ref 8.7–10.5)
CHLORIDE SERPL-SCNC: 105 MMOL/L (ref 95–110)
CO2 SERPL-SCNC: 23 MMOL/L (ref 23–29)
CREAT SERPL-MCNC: 0.9 MG/DL (ref 0.5–1.4)
DIFFERENTIAL METHOD BLD: ABNORMAL
EOSINOPHIL # BLD AUTO: 0.1 K/UL (ref 0–0.5)
EOSINOPHIL NFR BLD: 0.6 % (ref 0–8)
ERYTHROCYTE [DISTWIDTH] IN BLOOD BY AUTOMATED COUNT: 17.2 % (ref 11.5–14.5)
EST. GFR  (NO RACE VARIABLE): >60 ML/MIN/1.73 M^2
GLUCOSE SERPL-MCNC: 77 MG/DL (ref 70–110)
HCT VFR BLD AUTO: 21.1 % (ref 40–54)
HGB BLD-MCNC: 6.7 G/DL (ref 14–18)
HGB BLD-MCNC: 7 G/DL (ref 14–18)
IMM GRANULOCYTES # BLD AUTO: 0.15 K/UL (ref 0–0.04)
IMM GRANULOCYTES NFR BLD AUTO: 1.5 % (ref 0–0.5)
LYMPHOCYTES # BLD AUTO: 1.2 K/UL (ref 1–4.8)
LYMPHOCYTES NFR BLD: 11.9 % (ref 18–48)
MAGNESIUM SERPL-MCNC: 1.6 MG/DL (ref 1.6–2.6)
MCH RBC QN AUTO: 26.8 PG (ref 27–31)
MCHC RBC AUTO-ENTMCNC: 31.8 G/DL (ref 32–36)
MCV RBC AUTO: 84 FL (ref 82–98)
MONOCYTES # BLD AUTO: 1.3 K/UL (ref 0.3–1)
MONOCYTES NFR BLD: 13.1 % (ref 4–15)
NEUTROPHILS # BLD AUTO: 7.1 K/UL (ref 1.8–7.7)
NEUTROPHILS NFR BLD: 72.7 % (ref 38–73)
NRBC BLD-RTO: 0 /100 WBC
PHOSPHATE SERPL-MCNC: 2.9 MG/DL (ref 2.7–4.5)
PLATELET # BLD AUTO: 393 K/UL (ref 150–450)
PMV BLD AUTO: 10.1 FL (ref 9.2–12.9)
POCT GLUCOSE: 85 MG/DL (ref 70–110)
POCT GLUCOSE: 89 MG/DL (ref 70–110)
POCT GLUCOSE: 89 MG/DL (ref 70–110)
POCT GLUCOSE: 90 MG/DL (ref 70–110)
POCT GLUCOSE: 92 MG/DL (ref 70–110)
POCT GLUCOSE: 92 MG/DL (ref 70–110)
POTASSIUM SERPL-SCNC: 3.2 MMOL/L (ref 3.5–5.1)
PROT SERPL-MCNC: 6.3 G/DL (ref 6–8.4)
RBC # BLD AUTO: 2.5 M/UL (ref 4.6–6.2)
SODIUM SERPL-SCNC: 134 MMOL/L (ref 136–145)
SPECIMEN OUTDATE: NORMAL
WBC # BLD AUTO: 9.82 K/UL (ref 3.9–12.7)

## 2024-03-14 PROCEDURE — 80053 COMPREHEN METABOLIC PANEL: CPT

## 2024-03-14 PROCEDURE — 63600175 PHARM REV CODE 636 W HCPCS

## 2024-03-14 PROCEDURE — 25000003 PHARM REV CODE 250: Performed by: STUDENT IN AN ORGANIZED HEALTH CARE EDUCATION/TRAINING PROGRAM

## 2024-03-14 PROCEDURE — 83735 ASSAY OF MAGNESIUM: CPT

## 2024-03-14 PROCEDURE — 36415 COLL VENOUS BLD VENIPUNCTURE: CPT | Performed by: STUDENT IN AN ORGANIZED HEALTH CARE EDUCATION/TRAINING PROGRAM

## 2024-03-14 PROCEDURE — 86901 BLOOD TYPING SEROLOGIC RH(D): CPT | Performed by: STUDENT IN AN ORGANIZED HEALTH CARE EDUCATION/TRAINING PROGRAM

## 2024-03-14 PROCEDURE — 20600001 HC STEP DOWN PRIVATE ROOM

## 2024-03-14 PROCEDURE — 97112 NEUROMUSCULAR REEDUCATION: CPT | Mod: CO

## 2024-03-14 PROCEDURE — 97110 THERAPEUTIC EXERCISES: CPT | Mod: CO

## 2024-03-14 PROCEDURE — 36415 COLL VENOUS BLD VENIPUNCTURE: CPT | Mod: XB

## 2024-03-14 PROCEDURE — 84100 ASSAY OF PHOSPHORUS: CPT

## 2024-03-14 PROCEDURE — 25000003 PHARM REV CODE 250

## 2024-03-14 PROCEDURE — 97110 THERAPEUTIC EXERCISES: CPT | Mod: CQ

## 2024-03-14 PROCEDURE — 97535 SELF CARE MNGMENT TRAINING: CPT

## 2024-03-14 PROCEDURE — 85025 COMPLETE CBC W/AUTO DIFF WBC: CPT

## 2024-03-14 PROCEDURE — 92526 ORAL FUNCTION THERAPY: CPT

## 2024-03-14 PROCEDURE — 97112 NEUROMUSCULAR REEDUCATION: CPT | Mod: CQ

## 2024-03-14 PROCEDURE — 85018 HEMOGLOBIN: CPT | Performed by: STUDENT IN AN ORGANIZED HEALTH CARE EDUCATION/TRAINING PROGRAM

## 2024-03-14 RX ADMIN — ATORVASTATIN CALCIUM 40 MG: 40 TABLET, FILM COATED ORAL at 10:03

## 2024-03-14 RX ADMIN — POTASSIUM BICARBONATE 50 MEQ: 978 TABLET, EFFERVESCENT ORAL at 02:03

## 2024-03-14 RX ADMIN — ENOXAPARIN SODIUM 40 MG: 40 INJECTION SUBCUTANEOUS at 05:03

## 2024-03-14 RX ADMIN — MICONAZOLE NITRATE 2 % TOPICAL POWDER: at 10:03

## 2024-03-14 RX ADMIN — SILODOSIN 4 MG: 4 CAPSULE ORAL at 10:03

## 2024-03-14 RX ADMIN — MICONAZOLE NITRATE 2 % TOPICAL POWDER: at 08:03

## 2024-03-14 NOTE — PT/OT/SLP PROGRESS
"Occupational Therapy   Co-Treatment with PT  Co-treatment performed due to patient's multiple deficits requiring two skilled therapists to appropriately and safely assess patient's strength and endurance while facilitating functional tasks in addition to accommodating for patient's activity tolerance.     Name: Bj Pate Jr.  MRN: [id number]  Admitting Diagnosis:  Acute encephalopathy       Recommendations:     Discharge Recommendations: Moderate Intensity Therapy  Discharge Equipment Recommendations:  to be determined by next level of care  Barriers to discharge:       Assessment:     Bj Pate Jr. is a 74 y.o. male with a medical diagnosis of Acute encephalopathy.  He presents with the following performance deficits affecting function: weakness, gait instability, impaired balance, impaired endurance, impaired cognition, impaired self care skills, impaired functional mobility, pain, decreased lower extremity function, decreased upper extremity function, decreased ROM, impaired cardiopulmonary response to activity, impaired joint extensibility, abnormal tone, impaired skin, edema, impaired coordination.     Pt agreeable to therapy and tolerated session well. Pt requiring significant assistance with transfers. Pt sat EOB with total assistance, performing PROM and exercises. Pt then returned to supine to continue stretches and leg exercises. He c/o pain "everywhere."    Rehab Prognosis:  Fair; patient would benefit from acute skilled OT services to address these deficits and reach maximum level of function.       Plan:     Patient to be seen 4 x/week to address the above listed problems via self-care/home management, therapeutic activities, therapeutic exercises, neuromuscular re-education  Plan of Care Expires: 04/12/24  Plan of Care Reviewed with: patient    Subjective     Chief Complaint: pain  Patient/Family Comments/goals: to improve function  Pain/Comfort:  Pain Rating 1: 8/10  Location - Orientation 1: " "generalized  Location 1:  ("everywhere")  Pain Addressed 1: Reposition, Distraction  Pain Rating Post-Intervention 1: 8/10    Objective:     Communicated with: RN prior to session.  Patient found HOB elevated with telemetry, pulse ox (continuous), peripheral IV, pressure relief boots, blood pressure cuff upon OT entry to room.  A client care conference was completed by the OTR and the HOUSE prior to treatment by the HOUSE to discuss the patient's POC and current status.    General Precautions: Standard, fall    Orthopedic Precautions:N/A  Braces: N/A  Respiratory Status: Room air     Occupational Performance:     Bed Mobility:    Patient completed Scooting/Bridging with total assistance and 2 persons  Patient completed Supine to Sit with total assistance and 2 persons  Patient completed Sit to Supine with total assistance and 2 persons     Functional Mobility/Transfers:  Not performed d/t weakness    Activities of Daily Living:  Lower Body Dressing: total assistance to don socks      [facility] 6 Click ADL: 6    Treatment & Education:  Pt educated on OT POC and frequency during hospital stay.   Pt performing passive stretching and PROM in BUE to improve function and ROM.   Pt sat EOB for ~15min with Total A of 2 persons. Back support and blocking of knees so that pt doesn't slide of EOB.  Addressed all patient questions/concerns within HOUSE scope of practice.     Patient left HOB elevated with all lines intact, call button in reach, and RN notified    GOALS:   Multidisciplinary Problems       Occupational Therapy Goals          Problem: Occupational Therapy    Goal Priority Disciplines Outcome Interventions   Occupational Therapy Goal     OT, PT/OT Ongoing, Progressing    Description: Goals to be met by: 4/2/2024     Patient will increase functional independence with ADLs by performing:    UE Dressing with Moderate Assistance.  Grooming while seated with Minimal Assistance.   Sitting at edge of bed x8 minutes with Contact " Guard Assistance.  Supine to sit with Moderate Assistance.  Sit<>stand transfers with Minimal Assistance.  Upper extremity exercise program 2x10 reps, with supervision.                         Time Tracking:     OT Date of Treatment: 03/14/24  OT Start Time: 0839  OT Stop Time: 0910  OT Total Time (min): 31 min    Billable Minutes:Therapeutic Exercise 15  Neuromuscular Re-education 16    OT/АЛЕКСАНДР: АЛЕКСАНДР     Number of АЛЕКСАНДР visits since last OT visit: 1    3/14/2024

## 2024-03-14 NOTE — ASSESSMENT & PLAN NOTE
Presented from NH with altered mentation, initially intubated for airway protection 3/4, extubated 3/10. Likely acute on chronic in the setting of history of CVA with likely low cognitive reserve at baseline, and exacerbated by UTI. Varying accounts of baseline makes evaluation difficult. Per daughter: Has not been functionally independent since November (before December admission), currently lives in assisted living and requires assstance with ADLs. Mobility significantly impaird. AOx3 4-5 days out of the week, at other times will be confused and disoriented. 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 CTH on admission unremarkable for acute process. MRI spine obtained on 3/10 showing chronic dicitis  Plan:  -Status post treatment for UTI  -Neurology on board, expect extended return to baseline given significant infection in the setting of fairly low cognitive reserve, continue treatment of active medical issues  -Contacted neuro 3/13 to discuss LP/further workup since patient not near baseline   Per radiology, patient's anatomy should not limit bedside LP attempt.   -NG tube placed, may consider PEG if aligns with [facility]

## 2024-03-14 NOTE — CONSULTS
IR consulted for an LP. Please reach out to fluoro at [phone] and/or [phone] for evaluation as IR does not perform LPs. Discussed with ordering provider    Di Dupree PA-C  Interventional Radiology  Spectra: [phone]  3/14/2024

## 2024-03-14 NOTE — PROGRESS NOTES
[facility]  Progress Note    Patient Name: Bj Pate Jr.  MRN: [id number]  Patient Class: IP- Inpatient   Admission Date: 3/4/2024  Length of Stay: 10 days  Attending Physician: Anum Sousa MD  Primary Care Provider: Jose Antonio Foster MD        Subjective:     Principal Problem:Acute encephalopathy        HPI:  73M with PMH CVA with R sided deficits, DM, HTN who presents to the ED from SNF for AMS. Per chart, he was more somnolent than usual over the last 2 days. At baseline he is verbal and follows commands but does not ambulate. On arrival to ED he was hypothermic to 87.9F and found to be hypoglycemic to the 50s. Intermittently hypotensive to MAP 55. Patient is awake but does not interact meaningfully on exam. Chronic Rivera was initially draining cloudy urine with sediment, Rivera exchanged. Initial labs without leukocytosis, CMP unremarkable, VBG with pH 7.49/30.3/38.9, lactate normal. Received 1L NS bolus through warming pump. Initially admitted to , critical care consulted for severe hypothermia with hypotension.    Overview/Hospital Course:  Admitted to the ED from SNF with acute encephalopathy. Initially admitted to hospital medicine however critical care consulted for severe hypothermia and hypotension.  Intubated 3/4 for airway protection, eventually extubated 3/10.  Workup notable for Citrobacter farmeri UTI, antibiotics tailored accordingly.  Neurology has been on board due to ongoing encephalopathy.  Recommending continued treatment of active medical issues.  Admission further complicated by heart block, likely Wenckebach for which EP was consulted. Likely indicative of periods of increased vagal tone or disordered sleep breathing, no evidnece of high degree block recorded, no indication for PPM. In addition, has been relatively intolerant of p.o. intake, NG tube has been placed with tube feeds started.  Failed MBSS. May possibly consider PEG if not  improved    Interval History: No acute events overnight. Patient oriented to self only this morning. Denies acute complaints. Neurology concerned for CNS involvement in chronic infection as evidenced by imaging. Plan for LP 3/15.     Review of Systems  Objective:     Vital Signs (Most Recent):  Temp: 99.8 °F (37.7 °C) (03/14/24 0440)  Pulse: 96 (03/14/24 1158)  Resp: 20 (03/14/24 0440)  BP: 106/67 (03/14/24 0440)  SpO2: 97 % (03/14/24 0440) Vital Signs (24h Range):  Temp:  [98.9 °F (37.2 °C)-99.8 °F (37.7 °C)] 99.8 °F (37.7 °C)  Pulse:  [75-99] 96  Resp:  [16-21] 20  SpO2:  [95 %-97 %] 97 %  BP: (106-118)/(62-67) 106/67     Weight: 109.5 kg (241 lb 6.5 oz)  Body mass index is 35.65 kg/m².    Intake/Output Summary (Last 24 hours) at 3/14/2024 1321  Last data filed at 3/14/2024 0623  Gross per 24 hour   Intake 200 ml   Output 557 ml   Net -357 ml         Physical Exam  Vitals and nursing note reviewed.   Constitutional:       General: He is not in acute distress.     Appearance: He is ill-appearing.   Eyes:      Extraocular Movements: Extraocular movements intact.   Cardiovascular:      Rate and Rhythm: Normal rate and regular rhythm.      Heart sounds: Normal heart sounds.   Pulmonary:      Effort: No respiratory distress.      Breath sounds: Normal breath sounds. No wheezing.   Abdominal:      General: There is no distension.      Palpations: Abdomen is soft.      Tenderness: There is no abdominal tenderness.   Musculoskeletal:         General: No tenderness.      Right lower leg: No edema.      Left lower leg: No edema.      Comments: BUE contractures   Neurological:      Mental Status: He is alert. He is disoriented.   Psychiatric:         Behavior: Behavior normal.             Significant Labs: All pertinent labs within the past 24 hours have been reviewed.    Significant Imaging: I have reviewed all pertinent imaging results/findings within the past 24 hours.    Assessment/Plan:      * Acute  "encephalopathy  Presented from NH with altered mentation, initially intubated for airway protection 3/4, extubated 3/10. Likely acute on chronic in the setting of history of CVA with likely low cognitive reserve at baseline, and exacerbated by UTI. Varying accounts of baseline makes evaluation difficult. Per daughter: Has not been functionally independent since November (before December admission), currently lives in assisted living and requires assstance with ADLs. Mobility significantly impaird. AOx3 4-5 days out of the week, at other times will be confused and disoriented. 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 CTH on admission unremarkable for acute process. MRI spine obtained on 3/10 showing chronic dicitis  Plan:  -Status post treatment for UTI  -Neurology on board, expect extended return to baseline given significant infection in the setting of fairly low cognitive reserve, continue treatment of active medical issues  -Contacted neuro 3/13 to discuss LP/further workup since patient not near baseline   Per radiology, patient's anatomy should not limit bedside LP attempt.   -NG tube placed, may consider PEG if aligns with [facility]        Dysphagia  SLP on board, recommending strict NPO  Continue tube feeds for now via NG tube, nutrition on board  As above, consider PEG tube if not improving and alligns with [facility]      Sepsis  This patient does have evidence of infective focus  My overall impression is sepsis.  Source: Urinary Tract  Antibiotics given-   Antibiotics (72h ago, onward)      None          Latest lactate reviewed-  No results for input(s): "LACTATE", "POCLAC" in the last 72 hours.  Organ dysfunction indicated by Encephalopathy    Fluid challenge Not needed - patient is not hypotensive      Post- resuscitation assessment No - Post resuscitation assessment not needed       Will Not start Pressors- Levophed for MAP of 65  Source control achieved " by: antibiotics    Urinary tract infection without hematuria  -now s/p 7 days zosyn  -maintain bryant for now      Second degree AV block, Mobitz type I  -Concern for CHB overnight 3/6  -EP: Likely indicative of periods of increased vagal tone or disordered sleep breathing, no evidence of high-degree block noted therefore no indication for PPM/transcutaneous pacing        Hypoglycemia        Hypothermia  Resolved      Hyponatremia  Patient has hyponatremia which is controlled,We will aim to correct the sodium by 4-6mEq in 24 hours. We will monitor sodium Daily. The hyponatremia is due to Dehydration/hypovolemia. The patient's sodium results have been reviewed and are listed below.  Recent Labs   Lab 03/14/24  0801   *         History of CVA (cerebrovascular accident)  History of R sided hemiparesis from prior CVA        VTE Risk Mitigation (From admission, onward)           Ordered     enoxaparin injection 40 mg  Every 24 hours         03/12/24 1008     IP VTE HIGH RISK PATIENT  Once         03/04/24 0316     Place sequential compression device  Until discontinued         03/04/24 0316                    Discharge Planning   GLORIA: 3/19/2024     Code Status: Full Code   Is the patient medically ready for discharge?: No    Reason for patient still in hospital (select all that apply): Laboratory test and Consult recommendations  Discharge Plan A: Skilled Nursing Facility ([facility])   Discharge Delays: None known at this time              Anum Sousa MD  Department of Hospital Medicine   [facility] - [facility] Step[facility]

## 2024-03-14 NOTE — PLAN OF CARE
Patient able to have conversations and answer yes/no questions. Not aware in terms of situation. VSS. On room air. Afebrile. Accuchecks q4 hrs. NSR on bedside telemetry. TF infusing through R nare NG tube at 10 ml/hr (goal rate). Turning q2 hours. Triad cream applied to backside. Pt incontinent of stool. Chronic bryant in place and draining yellow urine. CPT done for cough; pt able to cough up a small amount of clear sputum. SpO2 > 95%. Pt NPO pending repeat barium swallow study. Green heel boots on. Bed locked and in lowest settings. Bedside alarms audible.
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
## 2024-03-14 NOTE — PT/OT/SLP PROGRESS
"Physical Therapy Treatment  Co Treatment with Occupational Therapy    Patient Name:  Bj Pate Jr.   MRN:  [id number]    Recommendations:     Discharge Recommendations: Moderate Intensity Therapy  Discharge Equipment Recommendations: to be determined by next level of care  Barriers to discharge: Decreased caregiver support    Assessment:     Bj Pate Jr. is a 74 y.o. male admitted with a medical diagnosis of Acute encephalopathy.  He presents with the following impairments/functional limitations: weakness, gait instability, impaired balance, impaired endurance, impaired cognition, impaired self care skills, impaired functional mobility, pain, decreased lower extremity function, decreased upper extremity function, decreased ROM, impaired cardiopulmonary response to activity, impaired joint extensibility, abnormal tone, impaired skin, edema, impaired coordination.    Pt agreeable for session at this time. Pt tolerates session fairly with emphasis on bed mobility, sitting balance, and BLE therex. Pt requires significant assistance x2 persons with bed mobility and sitting balance at this time. Pt unable to perform BLE therex at EOB 2/2 to poor sitting balance.  Pt will continue to benefit from skilled therapy services.    Rehab Prognosis: Fair; patient would benefit from acute skilled PT services to address these deficits and reach maximum level of function.    Recent Surgery: * No surgery found *      Plan:     During this hospitalization, patient to be seen 2 x/week to address the identified rehab impairments via therapeutic activities, therapeutic exercises, neuromuscular re-education and progress toward the following goals:    Plan of Care Expires:  04/10/24    Subjective     Chief Complaint: pain everywhere  Patient/Family Comments/goals: "I can try"  Pain/Comfort:  Pain Rating 1: 9/10  Location - Orientation 1: generalized  Location 1:  ("pain everywhere")  Pain Rating Post-Intervention 1: " 9/10      Objective:     Communicated with RN (Lexie) prior to session.  Patient found HOB elevated with telemetry, pulse ox (continuous), NG tube, pressure relief boots, bryant catheter upon PTA entry to room.     General Precautions: Standard, fall  Orthopedic Precautions: N/A  Braces: N/A  Respiratory Status: Room air     Functional Mobility:  Bed Mobility:     Rolling Right:  total assistance x2 persons  Scooting:anteriorly to EOB  total assistance x2 persons; to HOB total assistance x2 persons   Supine to Sit: total assistance x2 persons for Trunk/BLEs  Sit to Supine: total assistance x2 persons for Trunk/BLEs  Balance:   Static Sitting: Pt sits at EOB 15 minutes total assistance x2 persons  and B knees blocked with feet flat on floor with poor balance 2/2 to posterior lean. Pt unable to perform BLE therex in sitting 2/2 to poor balance.       AM-PAC 6 CLICK MOBILITY  Turning over in bed (including adjusting bedclothes, sheets and blankets)?: 2  Sitting down on and standing up from a chair with arms (e.g., wheelchair, bedside commode, etc.): 1  Moving from lying on back to sitting on the side of the bed?: 2  Moving to and from a bed to a chair (including a wheelchair)?: 1  Need to walk in hospital room?: 1  Climbing 3-5 steps with a railing?: 1  Basic Mobility Total Score: 8       Treatment & Education:  Patient provided with daily orientation and goals of this PT session.     Pt performs the following BLE therex in supine:  X15 AP and x10 Hip ABD/ADD AAROM     Pt educated to call for assistance and to transfer with hospital staff only.  Also, pt was educated on the effects of prolonged immobility and the importance of performing OOB activity and exercises to promote healing and reduce recovery time.    Co tx performed with OT due to patient need for 2 skilled therapist to ensure patient and staff safety and to accommondate for activity tolerance.    Patient left HOB elevated with all lines intact, call button  in reach, and RN notified.    GOALS:   Multidisciplinary Problems       Physical Therapy Goals          Problem: Physical Therapy    Goal Priority Disciplines Outcome Goal Variances Interventions   Physical Therapy Goal     PT, PT/OT Ongoing, Progressing     Description: Goals to be met by: 4/10/24     Patient will increase functional independence with mobility by performin. Supine to sit with Moderate Assistance  2. Sitting at edge of bed x10 minutes with Minimal Assistance to perform functional activities.   3. Lower extremity exercise program x10 reps per handout, with assistance as needed to increase strength for increased functional mobility.                          Time Tracking:     PT Received On: 24  PT Start Time: 840     PT Stop Time: 909  PT Total Time (min): 29 min     Billable Minutes: Therapeutic Exercise 14 and Neuromuscular Re-education 15    Treatment Type: Treatment  PT/PTA: PTA     Number of PTA visits since last PT visit: 2024

## 2024-03-14 NOTE — PROGRESS NOTES
"[facility] - Transplant Stepdown  Adult Nutrition  Progress Note    SUMMARY       Recommendations    [facility]. [facility] to goal rate as tolerated: Isosource 1.5 @ 40 ml/hr to provide 1440 kcals, 65 g of protein, and 733 ml of fluid   2. ADAT per SLP/MD  3. RD Following    Goals: Meet % of EEN/EPN by RD f/u date  Nutrition Goal Status: goal not met  Communication of RD Recs: other (comment)    Assessment and Plan    Nutrition Problem  Inadequate energy intake      Related to (etiology):   Physiological needs      Signs and Symptoms (as evidenced by):   NPO status      Interventions (treatment strategy):  Collaboration of nutrition care w/ other providers      Nutrition Diagnosis Status:   Continues    Reason for Assessment    Reason For Assessment: RD follow-up  Diagnosis: other (see comments)  Relevant Medical History: CKD stage 3, stroke  Interdisciplinary Rounds: did not attend  General Information Comments: RD f/u. RD saw pt at bedside 3/13 alongside Physician and nurse. Unable to wake up. RN states she's unsure of his baseline at this time. No family at bedside. [facility] NFPE not appropriate, appears to have waist in collar region. Pt didn't pass MBSS,no further plans for nutrition at this time. RD following  Nutrition Discharge Planning: pending clinical course    Nutrition Risk Screen    Nutrition Risk Screen: difficulty chewing/swallowing, tube feeding or parenteral nutrition    Nutrition/Diet History    Spiritual, Cultural Beliefs, Hoahaoism Practices, Values that Affect Care: no  Food Allergies: other (see comments) (shrimp)    Anthropometrics    Temp: 99.8 °F (37.7 °C)  Height Method: Estimated  Height: 5' 9" (175.3 cm)  Height (inches): 69 in  Weight Method: Bed Scale  Weight: 109.5 kg (241 lb 6.5 oz)  Weight (lb): 241.41 lb  Ideal Body Weight (IBW), Male: 160 lb  % Ideal Body Weight, Male (lb): 150.88 %  BMI (Calculated): 35.6  BMI Grade: 35 - 39.9 - obesity - " grade II       Lab/Procedures/Meds    Pertinent Labs Reviewed: reviewed  Pertinent Labs Comments: H/H: 7.3/23, mchc: 31.7, Na: 133, Potassium: 3.2, ALT: 9  Pertinent Medications Reviewed: reviewed  Pertinent Medications Comments: Atorvastatin, enoxaparin, polyethylene glycol, senna-docusate, potassium sodium phosphates, NaCl, D5      Estimated/Assessed Needs    Weight Used For Calorie Calculations: 72 kg (158 lb 11.7 oz)  Energy Calorie Requirements (kcal): 1460  Energy Need Method: [place]-St Jeor (PAL 1.0 IBW of 72 kg)  Protein Requirements: 144-180 g (1.2-1.5 g/kg)  Weight Used For Protein Calculations: 119.9 kg (264 lb 5.3 oz)        RDA Method (mL): 1460         Nutrition Prescription Ordered    Current Diet Order: NPO status  Current Nutrition Support Formula Ordered: Isosource 1.5  Current Nutrition Support Rate Ordered: 10 (ml) (ml)  Current Nutrition Support Frequency Ordered: x24 hours    Evaluation of Received Nutrient/Fluid Intake    Enteral Calories (kcal): 360  Enteral Protein (gm): 16  Enteral (Free Water) Fluid (mL): 183  % Kcal Needs: 24  % Protein Needs: 9  I/O: +5.4 L since admit  Energy Calories Required: not meeting needs  Protein Required: not meeting needs  Fluid Required: not meeting needs  Total Fluid Intake (mL/kg): 1 ml or fluid per MD  Comments: LBM 3/12  Tolerance: tolerating  % Intake of Estimated Energy Needs: 0 - 25 %  % Meal Intake: NPO    Nutrition Risk    Level of Risk/Frequency of Follow-up: low ((1x/week))     Monitor and Evaluation    Food and Nutrient Intake: energy intake, food and beverage intake  Food and Nutrient Adminstration: diet order, enteral and parenteral nutrition administration  Knowledge/Beliefs/Attitudes: food and nutrition knowledge/skill, beliefs and attitudes  Physical Activity and Function: nutrition-related ADLs and IADLs, factors affecting access to physical activity  Anthropometric Measurements: height/length, weight, weight change, other (specify), growth  pattern indices/percentile ranks, body mass index  Biochemical Data, Medical Tests and Procedures: electrolyte and renal panel, gastrointestinal profile, glucose/endocrine profile, inflammatory profile, lipid profile  Nutrition-Focused Physical Findings: overall appearance, extremities, muscles and bones, head and eyes, skin     Nutrition Follow-Up    RD Follow-up?: Yes    Mirella Hurley RD, LDN

## 2024-03-14 NOTE — SUBJECTIVE & OBJECTIVE
Interval History: No acute events overnight. Patient oriented to self only this morning. Denies acute complaints. Neurology concerned for CNS involvement in chronic infection as evidenced by imaging. Plan for LP 3/15.     Review of Systems  Objective:     Vital Signs (Most Recent):  Temp: 99.8 °F (37.7 °C) (03/14/24 0440)  Pulse: 96 (03/14/24 1158)  Resp: 20 (03/14/24 0440)  BP: 106/67 (03/14/24 0440)  SpO2: 97 % (03/14/24 0440) Vital Signs (24h Range):  Temp:  [98.9 °F (37.2 °C)-99.8 °F (37.7 °C)] 99.8 °F (37.7 °C)  Pulse:  [75-99] 96  Resp:  [16-21] 20  SpO2:  [95 %-97 %] 97 %  BP: (106-118)/(62-67) 106/67     Weight: 109.5 kg (241 lb 6.5 oz)  Body mass index is 35.65 kg/m².    Intake/Output Summary (Last 24 hours) at 3/14/2024 1321  Last data filed at 3/14/2024 0623  Gross per 24 hour   Intake 200 ml   Output 557 ml   Net -357 ml         Physical Exam  Vitals and nursing note reviewed.   Constitutional:       General: He is not in acute distress.     Appearance: He is ill-appearing.   Eyes:      Extraocular Movements: Extraocular movements intact.   Cardiovascular:      Rate and Rhythm: Normal rate and regular rhythm.      Heart sounds: Normal heart sounds.   Pulmonary:      Effort: No respiratory distress.      Breath sounds: Normal breath sounds. No wheezing.   Abdominal:      General: There is no distension.      Palpations: Abdomen is soft.      Tenderness: There is no abdominal tenderness.   Musculoskeletal:         General: No tenderness.      Right lower leg: No edema.      Left lower leg: No edema.      Comments: BUE contractures   Neurological:      Mental Status: He is alert. He is disoriented.   Psychiatric:         Behavior: Behavior normal.             Significant Labs: All pertinent labs within the past 24 hours have been reviewed.    Significant Imaging: I have reviewed all pertinent imaging results/findings within the past 24 hours.

## 2024-03-14 NOTE — PLAN OF CARE
Recommendations    1. Continue TF at trickle, adv to goal rate as tolerated: Isosource 1.5 @ 40 ml/hr to provide 1440 kcals, 65 g of protein, and 733 ml of fluid   2. ADAT per SLP/MD  3. RD Following    Goals: Meet % of EEN/EPN by RD f/u date  Nutrition Goal Status: goal not met  Communication of RD Recs: other (comment)

## 2024-03-14 NOTE — PHYSICIAN QUERY
PT Name: Bj Pate Jr.  MR #: [id number]     DOCUMENTATION CLARIFICATION      CDS/:  Daniel Willis RN, CDS                   Contact Information:  [email or web address]    This form is a permanent document in the medical record.     Query Date: March 14, 2024    By submitting this query, we are merely seeking further clarification of documentation. Please utilize your independent clinical judgment when addressing the question(s) below.    The Medical Record contains the following:     Indicators   Supporting Clinical Findings     Location in Medical Record   X Documentation of condition Urinary tract infection without hematuria       Workup notable for Citrobacter farmeri UTI, antibiotics tailored accordingly.   HM PN 3/13         X Urinary Device, Catheter chronic indwelling bryant Neuro 3/11      Lab Value(s)     X UA Results Specimen UA: Urine, Catheterized  Color, UA: Orange   Appearance, UA: Ex.Turbid  Specific Gravity, UA: 1.020  pH, UA: 6.0  Protein, UA: 2+   Glucose, UA: 1+   Ketones, UA: Negative  Blood, UA: 2+   NITRITE UA: Positive   Bilirubin (UA): Negative  Leukocyte Esterase, UA: 3+   RBC, UA: 94   WBC, UA: >100   Bacteria, UA: Moderate   WBC Clumps, UA: Many   Hyaline Casts, UA: 0  Microscopic Comment: SEE COMMENT   Labs 3/4   X Cultures Urine Culure: +Citrobacter Farmeri Labs 3/4     X Treatment/Medication Chronic Bryant was initially draining cloudy urine with sediment, Bryant exchanged.  [facility] resident handoff 3/12     X Other They report a history of UTIs and the patient does have indwelling Bryant catheter.    ED MD 3/4      Provider, please clarify if there is any clinical correlation between UTI and Chronic Indwelling Bryant Catheter.    [ x  ] Due to or associated with each other   [   ] Unrelated to each other   [   ] Other explanation (please specify): _____________   [   ] Clinically undetermined       Please document in your progress notes daily for the duration of treatment  until resolved, and include in your discharge summary.    Form No. [id number]

## 2024-03-14 NOTE — PT/OT/SLP PROGRESS
Speech Language Pathology Treatment    Patient Name:  Bj Pate Jr.   MRN:  [id number]  Admitting Diagnosis: Acute encephalopathy    Recommendations:                 General Recommendations:  Dysphagia therapy  Diet recommendations:  NPO, Liquid Diet Level: NPO   Aspiration Precautions: Continue alternate means of nutrition  For pleasure when awake and alert   OK for ice chips for pleasure   Ok for small sips of thin liquids via teaspoons or medicine cup for pleasure   OK for tastes of puree for pleasure      General Precautions: Standard, aspiration, fall, NPO  Communication strategies:  none    Assessment:     Bj Pate Jr. is a 74 y.o. male with an SLP diagnosis of Dysphonia impacting ability to meet nutrition/hydration/medication needs by mouth. He presents with safe ability to continue with tastes for pleasure of thin liquids and purees. Ongoing alternate means of supplemental nutrition remains safest.   Subjective     Pt awake   Daughter at the bedside     Pain/Comfort:  Pt denying discomfort     Respiratory Status: Room air    Objective:     Has the patient been evaluated by SLP for swallowing?   Yes  Keep patient NPO? Yes     Pt awake and daughter at the bedside. Pt with NG tube in place. SLP discussed with daughter at the bedside results from MBS which occurred the other day. Pt daughter reporting pt cognitive status remains changed from baseline.  SLP offered pt tastes of water via medicine cup this date (small single regulated sips). Pt managed without overt clinical signs of aspiration y and clear vocal quality post trials. Pt only accepting trials x2 and then reporting disinterest in additional PO intake despite SLP max encouragement. SLP discussed with daughter cognitive status continues to remain a a major limiting factor in ability to safely participate in PO trials and swallow treatment. SLP reviewed ongoing supplemental tube feeds as safest most efficient form of intake with tastes of thin  liquids and purees for pleasure when awake alert and requesting. Daughter and pt without additional questions and in agreement with plan. Whiteboard current.  Ongoing education and support warranted.     Goals:   Multidisciplinary Problems       SLP Goals          Problem: SLP    Goal Priority Disciplines Outcome   SLP Goal     SLP    Description: Speech Language Pathology Goals  Goals expected to be met by 3/18:  1. Pt will participate in Modified Barium Swallow Study to r/o aspiration and determine safest PO diet.                                Plan:     Patient to be seen:  3 x/week   Plan of Care expires:  04/12/24  Plan of Care reviewed with:  patient   SLP Follow-Up:  Yes       Discharge recommendations:   (tbd)   Barriers to Discharge:  None    Time Tracking:     SLP Treatment Date:   03/14/24  Speech Start Time:  1534  Speech Stop Time:  1548     Speech Total Time (min):  14 min    Billable Minutes: Treatment Swallowing Dysfunction 6 and Self Care/Home Management Training 8    03/14/2024

## 2024-03-14 NOTE — CONSULTS
Neurology re-consulted for persistent encephalopathy for Mr. Pate.    Will attempt to perform LP at bedside on 3/14 per our previous recommendations.    For other recommendations please see our note completed on 3/11/2024

## 2024-03-14 NOTE — ASSESSMENT & PLAN NOTE
Patient has hyponatremia which is controlled,We will aim to correct the sodium by 4-6mEq in 24 hours. We will monitor sodium Daily. The hyponatremia is due to Dehydration/hypovolemia. The patient's sodium results have been reviewed and are listed below.  Recent Labs   Lab 03/14/24  0801   *

## 2024-03-15 PROBLEM — R19.7 DIARRHEA: Status: ACTIVE | Noted: 2024-03-15

## 2024-03-15 LAB
ALBUMIN SERPL BCP-MCNC: 1.5 G/DL (ref 3.5–5.2)
ALP SERPL-CCNC: 102 U/L (ref 55–135)
ALT SERPL W/O P-5'-P-CCNC: 16 U/L (ref 10–44)
ANION GAP SERPL CALC-SCNC: 8 MMOL/L (ref 8–16)
AST SERPL-CCNC: 30 U/L (ref 10–40)
BASOPHILS # BLD AUTO: 0.02 K/UL (ref 0–0.2)
BASOPHILS NFR BLD: 0.2 % (ref 0–1.9)
BILIRUB SERPL-MCNC: 0.6 MG/DL (ref 0.1–1)
BUN SERPL-MCNC: 15 MG/DL (ref 8–23)
C DIFF GDH STL QL: POSITIVE
C DIFF TOX A+B STL QL IA: POSITIVE
CALCIUM SERPL-MCNC: 9.1 MG/DL (ref 8.7–10.5)
CHLORIDE SERPL-SCNC: 105 MMOL/L (ref 95–110)
CO2 SERPL-SCNC: 23 MMOL/L (ref 23–29)
CREAT SERPL-MCNC: 0.9 MG/DL (ref 0.5–1.4)
DIFFERENTIAL METHOD BLD: ABNORMAL
EOSINOPHIL # BLD AUTO: 0 K/UL (ref 0–0.5)
EOSINOPHIL NFR BLD: 0.3 % (ref 0–8)
ERYTHROCYTE [DISTWIDTH] IN BLOOD BY AUTOMATED COUNT: 17.2 % (ref 11.5–14.5)
EST. GFR  (NO RACE VARIABLE): >60 ML/MIN/1.73 M^2
GLUCOSE SERPL-MCNC: 77 MG/DL (ref 70–110)
HCT VFR BLD AUTO: 22.5 % (ref 40–54)
HGB BLD-MCNC: 7.2 G/DL (ref 14–18)
IMM GRANULOCYTES # BLD AUTO: 0.11 K/UL (ref 0–0.04)
IMM GRANULOCYTES NFR BLD AUTO: 1 % (ref 0–0.5)
LYMPHOCYTES # BLD AUTO: 0.9 K/UL (ref 1–4.8)
LYMPHOCYTES NFR BLD: 8.4 % (ref 18–48)
MAGNESIUM SERPL-MCNC: 2 MG/DL (ref 1.6–2.6)
MCH RBC QN AUTO: 26.8 PG (ref 27–31)
MCHC RBC AUTO-ENTMCNC: 32 G/DL (ref 32–36)
MCV RBC AUTO: 84 FL (ref 82–98)
MONOCYTES # BLD AUTO: 1.2 K/UL (ref 0.3–1)
MONOCYTES NFR BLD: 10.6 % (ref 4–15)
NEUTROPHILS # BLD AUTO: 8.8 K/UL (ref 1.8–7.7)
NEUTROPHILS NFR BLD: 79.5 % (ref 38–73)
NRBC BLD-RTO: 0 /100 WBC
PHOSPHATE SERPL-MCNC: 2.7 MG/DL (ref 2.7–4.5)
PLATELET # BLD AUTO: 507 K/UL (ref 150–450)
PMV BLD AUTO: 10.2 FL (ref 9.2–12.9)
POCT GLUCOSE: 111 MG/DL (ref 70–110)
POCT GLUCOSE: 83 MG/DL (ref 70–110)
POCT GLUCOSE: 86 MG/DL (ref 70–110)
POCT GLUCOSE: 89 MG/DL (ref 70–110)
POCT GLUCOSE: 96 MG/DL (ref 70–110)
POCT GLUCOSE: 99 MG/DL (ref 70–110)
POTASSIUM SERPL-SCNC: 3.8 MMOL/L (ref 3.5–5.1)
PROT SERPL-MCNC: 6.7 G/DL (ref 6–8.4)
RBC # BLD AUTO: 2.69 M/UL (ref 4.6–6.2)
SODIUM SERPL-SCNC: 136 MMOL/L (ref 136–145)
WBC # BLD AUTO: 11.03 K/UL (ref 3.9–12.7)

## 2024-03-15 PROCEDURE — 84100 ASSAY OF PHOSPHORUS: CPT

## 2024-03-15 PROCEDURE — 25000003 PHARM REV CODE 250

## 2024-03-15 PROCEDURE — 63600175 PHARM REV CODE 636 W HCPCS: Performed by: STUDENT IN AN ORGANIZED HEALTH CARE EDUCATION/TRAINING PROGRAM

## 2024-03-15 PROCEDURE — 80053 COMPREHEN METABOLIC PANEL: CPT

## 2024-03-15 PROCEDURE — 27000207 HC ISOLATION

## 2024-03-15 PROCEDURE — 83735 ASSAY OF MAGNESIUM: CPT

## 2024-03-15 PROCEDURE — 87449 NOS EACH ORGANISM AG IA: CPT | Performed by: STUDENT IN AN ORGANIZED HEALTH CARE EDUCATION/TRAINING PROGRAM

## 2024-03-15 PROCEDURE — 99232 SBSQ HOSP IP/OBS MODERATE 35: CPT | Mod: ,,, | Performed by: PSYCHIATRY & NEUROLOGY

## 2024-03-15 PROCEDURE — 20600001 HC STEP DOWN PRIVATE ROOM

## 2024-03-15 PROCEDURE — 27201109 HC SYSTEM FECAL MANAGEMENT

## 2024-03-15 PROCEDURE — 94761 N-INVAS EAR/PLS OXIMETRY MLT: CPT

## 2024-03-15 PROCEDURE — 63600175 PHARM REV CODE 636 W HCPCS

## 2024-03-15 PROCEDURE — 36415 COLL VENOUS BLD VENIPUNCTURE: CPT

## 2024-03-15 PROCEDURE — 63600175 PHARM REV CODE 636 W HCPCS: Performed by: HOSPITALIST

## 2024-03-15 PROCEDURE — 85025 COMPLETE CBC W/AUTO DIFF WBC: CPT

## 2024-03-15 RX ORDER — CHLORHEXIDINE GLUCONATE ORAL RINSE 1.2 MG/ML
15 SOLUTION DENTAL 2 TIMES DAILY
Status: DISCONTINUED | OUTPATIENT
Start: 2024-03-16 | End: 2024-03-27 | Stop reason: HOSPADM

## 2024-03-15 RX ORDER — MAGNESIUM SULFATE HEPTAHYDRATE 40 MG/ML
2 INJECTION, SOLUTION INTRAVENOUS ONCE
Status: COMPLETED | OUTPATIENT
Start: 2024-03-15 | End: 2024-03-15

## 2024-03-15 RX ORDER — AMOXICILLIN 250 MG
1 CAPSULE ORAL 2 TIMES DAILY PRN
Status: DISCONTINUED | OUTPATIENT
Start: 2024-03-15 | End: 2024-03-27 | Stop reason: HOSPADM

## 2024-03-15 RX ORDER — POTASSIUM CHLORIDE 7.45 MG/ML
10 INJECTION INTRAVENOUS
Status: COMPLETED | OUTPATIENT
Start: 2024-03-15 | End: 2024-03-15

## 2024-03-15 RX ADMIN — MICONAZOLE NITRATE 2 % TOPICAL POWDER: at 11:03

## 2024-03-15 RX ADMIN — MICONAZOLE NITRATE 2 % TOPICAL POWDER: at 09:03

## 2024-03-15 RX ADMIN — POTASSIUM CHLORIDE 10 MEQ: 7.46 INJECTION, SOLUTION INTRAVENOUS at 04:03

## 2024-03-15 RX ADMIN — ENOXAPARIN SODIUM 40 MG: 40 INJECTION SUBCUTANEOUS at 06:03

## 2024-03-15 RX ADMIN — POTASSIUM CHLORIDE 10 MEQ: 7.46 INJECTION, SOLUTION INTRAVENOUS at 05:03

## 2024-03-15 RX ADMIN — SILODOSIN 4 MG: 4 CAPSULE ORAL at 08:03

## 2024-03-15 RX ADMIN — MAGNESIUM SULFATE HEPTAHYDRATE 2 G: 40 INJECTION, SOLUTION INTRAVENOUS at 02:03

## 2024-03-15 RX ADMIN — SODIUM CHLORIDE, POTASSIUM CHLORIDE, SODIUM LACTATE AND CALCIUM CHLORIDE 500 ML: 600; 310; 30; 20 INJECTION, SOLUTION INTRAVENOUS at 09:03

## 2024-03-15 RX ADMIN — ATORVASTATIN CALCIUM 40 MG: 40 TABLET, FILM COATED ORAL at 08:03

## 2024-03-15 NOTE — ASSESSMENT & PLAN NOTE
Patient's anemia is currently controlled. Has not received any PRBCs to date. Etiology likely d/t  chronic disease vs KIMMY  Current CBC reviewed-   Lab Results   Component Value Date    HGB 7.2 (L) 03/15/2024    HCT 22.5 (L) 03/15/2024     Monitor serial CBC and transfuse if patient becomes hemodynamically unstable, symptomatic or H/H drops below 7/21.

## 2024-03-15 NOTE — PROGRESS NOTES
Notified Dr. Sousa of patient experience oral temp of 100.1,  's, noted RUL wheezing and rub, catheter with sediment noted in urine, and poor skin turgor.  MD states to notify if temp > 100.4.  Will continue to monitor patient.

## 2024-03-15 NOTE — PROGRESS NOTES
Notified Dr. Sousa of patient's large bouts of diarrhea compromising cleanliness of bryant catheter site.  Order received to place FMS and send C-diff sample.  Will continue to monitor patient.

## 2024-03-15 NOTE — ASSESSMENT & PLAN NOTE
Significant diarrhea reported by nursing AM 3/15. No bowel regimen for over 48 hours and recent abx use. Concern for c diff vs tube feeds  - labs ordered  - rectal tube in place  - antidiarrheal if negative

## 2024-03-15 NOTE — PLAN OF CARE
"Patient remained free from injury. Patient Tmax of 100.1. Patient had a Bowel Movement upon entry for assessment. Patient at increased risk for infection due to diarrhea not contained with chucks pads and spreading to bryant catheter. Fecal Management system put in place to decrease risk of infection. MD ordered. Patient tolerated procedure well and no signs of discomfort noted.  Buttocks examined and triad applied to open skin area.  Powder to groin area.  Patient on specialty bed and patient allowing to be turned and repositioned Q2H.  Output of FMS total of 400 ml of brown liquid. Bryant Catheter output 150 ml of sedimenty cloudy urine. Patient has R NG tube infusing with 20 ml/hr which was increased from 10 ml/her per doctors order. Patients NG flushed with 300 mls free water flush per MD order with less than 3 mls residual. Patients son and daughter were at bedside today. Patient was less talkative today. Patient still oriented to person not place, situation, or time. Patient asked what year it was and he stated "2024" then asked who the president was and he stated "Dayne Galloway". Patient was able to move limbs against gravity more today than yesterday. Patient was consulted with Neurology and has lumbar puncture pending. Audrey Lagunas called unit to ask for am update on patients status. Patient's family eager to know what the next step is in his care and placement.  MRI without contrast ordered.   Patient sleeping comfortably in room with wedge and pillows.   "

## 2024-03-15 NOTE — PLAN OF CARE
SW followed up with treatment team on any CM needs, none needed at this time. CM will continue to f/u.     SRIDHAR Land  [facility]  Ext. [phone]

## 2024-03-15 NOTE — PROGRESS NOTES
Connor Mina - Transplant [facility] Medicine  Progress Note    Patient Name: Bj Pate Jr.  MRN: [id number]  Patient Class: IP- Inpatient   Admission Date: 3/4/2024  Length of Stay: 11 days  Attending Physician: Anum Sousa MD  Primary Care Provider: Jose Antonio Foster MD        Subjective:     Principal Problem:Acute encephalopathy        HPI:  73M with PMH CVA with R sided deficits, DM, HTN who presents to the ED from SNF for AMS. Per chart, he was more somnolent than usual over the last 2 days. At baseline he is verbal and follows commands but does not ambulate. On arrival to ED he was hypothermic to 87.9F and found to be hypoglycemic to the 50s. Intermittently hypotensive to MAP 55. Patient is awake but does not interact meaningfully on exam. Chronic Rivera was initially draining cloudy urine with sediment, Rivera exchanged. Initial labs without leukocytosis, CMP unremarkable, VBG with pH 7.49/30.3/38.9, lactate normal. Received 1L NS bolus through warming pump. Initially admitted to , critical care consulted for severe hypothermia with hypotension.    Overview/Hospital Course:  Admitted to the ED from SNF with acute encephalopathy. Initially admitted to hospital medicine however critical care consulted for severe hypothermia and hypotension.  Intubated 3/4 for airway protection, eventually extubated 3/10.  Workup notable for Citrobacter farmeri UTI, antibiotics tailored accordingly.  Neurology has been on board due to ongoing encephalopathy.  Recommending continued treatment of active medical issues.  Admission further complicated by heart block, likely Wenckebach for which EP was consulted. Likely indicative of periods of increased vagal tone or disordered sleep breathing, no evidnece of high degree block recorded, no indication for PPM. In addition, has been relatively intolerant of p.o. intake, NG tube has been placed with tube feeds started.  Failed MBSS. May possibly consider PEG if not  improved.    Patient alertness has improved but is still only oriented to self. Neurology following, LP to be done 3/15.     Interval History: Patient with worsening diarrhea, c diff labs ordered. Patient oriented to self, not place, time, or situation. He denies acute complaints. Denies chest pain, difficulty breathing, nausea or vomiting.     Review of Systems  Objective:     Vital Signs (Most Recent):  Temp: 98.6 °F (37 °C) (03/15/24 1235)  Pulse: 108 (03/15/24 1235)  Resp: 20 (03/15/24 1235)  BP: 112/61 (03/15/24 1235)  SpO2: 99 % (03/15/24 1235) Vital Signs (24h Range):  Temp:  [97.3 °F (36.3 °C)-100.1 °F (37.8 °C)] 98.6 °F (37 °C)  Pulse:  [] 108  Resp:  [14-22] 20  SpO2:  [96 %-99 %] 99 %  BP: (103-115)/(55-79) 112/61     Weight: 109.5 kg (241 lb 6.5 oz)  Body mass index is 35.65 kg/m².    Intake/Output Summary (Last 24 hours) at 3/15/2024 1305  Last data filed at 3/15/2024 1010  Gross per 24 hour   Intake 260 ml   Output 750 ml   Net -490 ml         Physical Exam  Vitals and nursing note reviewed.   Constitutional:       General: He is not in acute distress.     Appearance: He is ill-appearing.   HENT:      Mouth/Throat:      Mouth: Mucous membranes are dry.   Eyes:      Extraocular Movements: Extraocular movements intact.   Cardiovascular:      Rate and Rhythm: Regular rhythm. Tachycardia present.      Heart sounds: Normal heart sounds. No murmur heard.  Pulmonary:      Effort: No respiratory distress.      Breath sounds: Normal breath sounds. No wheezing.      Comments: Faint coarse breath sounds R parasternal  Abdominal:      General: There is no distension.      Palpations: Abdomen is soft.      Tenderness: There is no abdominal tenderness.   Musculoskeletal:         General: No tenderness.      Right lower leg: No edema.      Left lower leg: No edema.      Comments: BUE contractures   Skin:     General: Skin is warm and dry.   Neurological:      Mental Status: He is alert. He is disoriented.    Psychiatric:         Mood and Affect: Mood normal.         Behavior: Behavior normal.             Significant Labs: All pertinent labs within the past 24 hours have been reviewed.    Significant Imaging: I have reviewed all pertinent imaging results/findings within the past 24 hours.    Assessment/Plan:      * Acute encephalopathy  Presented from NH with altered mentation, initially intubated for airway protection 3/4, extubated 3/10. Likely acute on chronic in the setting of history of CVA with likely low cognitive reserve at baseline, and exacerbated by UTI. Varying accounts of baseline makes evaluation difficult. Per daughter: Has not been functionally independent since November (before December admission), currently lives in assisted living and requires assstance with ADLs. Mobility significantly impaird. AOx3 4-5 days out of the week, at other times will be confused and disoriented. 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 CTH on admission unremarkable for acute process. MRI spine obtained on 3/10 showing chronic dicitis  Plan:  -Status post treatment for UTI  -Neurology on board, expect extended return to baseline given significant infection in the setting of fairly low cognitive reserve, continue treatment of active medical issues  -Contacted neuro 3/13 to discuss LP/further workup since patient not near baseline   Per radiology, patient's anatomy should not limit bedside LP attempt.    Neurology to attempt bedside LP 3/15  -NG tube placed, may consider PEG if aligns with [facility]        Dysphagia  SLP on board, recommending strict NPO  - Continue tube feeds for now via NG tube, nutrition on board  - As above, consider PEG tube if not improving and alligns with [facility]      Sepsis  This patient does have evidence of infective focus  My overall impression is sepsis.  Source: Urinary Tract  Antibiotics given-   Antibiotics (72h ago, onward)      None       "    Latest lactate reviewed-  No results for input(s): "LACTATE", "POCLAC" in the last 72 hours.  Organ dysfunction indicated by Encephalopathy    Fluid challenge Not needed - patient is not hypotensive      Post- resuscitation assessment No - Post resuscitation assessment not needed       Will Not start Pressors- Levophed for MAP of 65  Source control achieved by: antibiotics    Urinary tract infection without hematuria  -now s/p 7 days zosyn  -maintain bryant for now      Diarrhea  Significant diarrhea reported by nursing AM 3/15. No bowel regimen for over 48 hours and recent abx use. Concern for c diff vs tube feeds  - labs ordered  - rectal tube in place  - antidiarrheal if negative      Second degree AV block, Mobitz type I  -Concern for CHB overnight 3/6  -EP: Likely indicative of periods of increased vagal tone or disordered sleep breathing, no evidence of high-degree block noted therefore no indication for PPM/transcutaneous pacing        Hypoglycemia        Hypothermia  Resolved      Anemia  Patient's anemia is currently controlled. Has not received any PRBCs to date. Etiology likely d/t  chronic disease vs KIMMY  Current CBC reviewed-   Lab Results   Component Value Date    HGB 7.2 (L) 03/15/2024    HCT 22.5 (L) 03/15/2024     Monitor serial CBC and transfuse if patient becomes hemodynamically unstable, symptomatic or H/H drops below 7/21.    Hyponatremia  Patient has hyponatremia which is controlled,We will aim to correct the sodium by 4-6mEq in 24 hours. We will monitor sodium Daily. The hyponatremia is due to Dehydration/hypovolemia. The patient's sodium results have been reviewed and are listed below.  Recent Labs   Lab 03/15/24  0656            History of CVA (cerebrovascular accident)  History of R sided hemiparesis from prior CVA        VTE Risk Mitigation (From admission, onward)           Ordered     enoxaparin injection 40 mg  Every 24 hours         03/12/24 1008     IP VTE HIGH RISK PATIENT  " Once         03/04/24 0316     Place sequential compression device  Until discontinued         03/04/24 0316                    Discharge Planning   GLORIA: 3/19/2024     Code Status: Full Code   Is the patient medically ready for discharge?: No    Reason for patient still in hospital (select all that apply): Patient trending condition, Treatment, and Consult recommendations  Discharge Plan A: Skilled Nursing Facility ([facility] to [facility])   Discharge Delays: None known at this time              Anum Sousa MD  Department of Hospital Medicine   [facility] - Transplant Stepdown

## 2024-03-15 NOTE — SUBJECTIVE & OBJECTIVE
Interval History: Patient with worsening diarrhea, c diff labs ordered. Patient oriented to self, not place, time, or situation. He denies acute complaints. Denies chest pain, difficulty breathing, nausea or vomiting.     Review of Systems  Objective:     Vital Signs (Most Recent):  Temp: 98.6 °F (37 °C) (03/15/24 1235)  Pulse: 108 (03/15/24 1235)  Resp: 20 (03/15/24 1235)  BP: 112/61 (03/15/24 1235)  SpO2: 99 % (03/15/24 1235) Vital Signs (24h Range):  Temp:  [97.3 °F (36.3 °C)-100.1 °F (37.8 °C)] 98.6 °F (37 °C)  Pulse:  [] 108  Resp:  [14-22] 20  SpO2:  [96 %-99 %] 99 %  BP: (103-115)/(55-79) 112/61     Weight: 109.5 kg (241 lb 6.5 oz)  Body mass index is 35.65 kg/m².    Intake/Output Summary (Last 24 hours) at 3/15/2024 1305  Last data filed at 3/15/2024 1010  Gross per 24 hour   Intake 260 ml   Output 750 ml   Net -490 ml         Physical Exam  Vitals and nursing note reviewed.   Constitutional:       General: He is not in acute distress.     Appearance: He is ill-appearing.   HENT:      Mouth/Throat:      Mouth: Mucous membranes are dry.   Eyes:      Extraocular Movements: Extraocular movements intact.   Cardiovascular:      Rate and Rhythm: Regular rhythm. Tachycardia present.      Heart sounds: Normal heart sounds. No murmur heard.  Pulmonary:      Effort: No respiratory distress.      Breath sounds: Normal breath sounds. No wheezing.      Comments: Faint coarse breath sounds R parasternal  Abdominal:      General: There is no distension.      Palpations: Abdomen is soft.      Tenderness: There is no abdominal tenderness.   Musculoskeletal:         General: No tenderness.      Right lower leg: No edema.      Left lower leg: No edema.      Comments: BUE contractures   Skin:     General: Skin is warm and dry.   Neurological:      Mental Status: He is alert. He is disoriented.   Psychiatric:         Mood and Affect: Mood normal.         Behavior: Behavior normal.             Significant Labs: All  pertinent labs within the past 24 hours have been reviewed.    Significant Imaging: I have reviewed all pertinent imaging results/findings within the past 24 hours.

## 2024-03-15 NOTE — ASSESSMENT & PLAN NOTE
Presented from NH with altered mentation, initially intubated for airway protection 3/4, extubated 3/10. Likely acute on chronic in the setting of history of CVA with likely low cognitive reserve at baseline, and exacerbated by UTI. Varying accounts of baseline makes evaluation difficult. Per daughter: Has not been functionally independent since November (before December admission), currently lives in assisted living and requires assstance with ADLs. Mobility significantly impaird. AOx3 4-5 days out of the week, at other times will be confused and disoriented. 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 CTH on admission unremarkable for acute process. MRI spine obtained on 3/10 showing chronic dicitis  Plan:  -Status post treatment for UTI  -Neurology on board, expect extended return to baseline given significant infection in the setting of fairly low cognitive reserve, continue treatment of active medical issues  -Contacted neuro 3/13 to discuss LP/further workup since patient not near baseline   Per radiology, patient's anatomy should not limit bedside LP attempt.    Neurology to attempt bedside LP 3/15  -NG tube placed, may consider PEG if aligns with GOC

## 2024-03-15 NOTE — ASSESSMENT & PLAN NOTE
Patient has hyponatremia which is controlled,We will aim to correct the sodium by 4-6mEq in 24 hours. We will monitor sodium Daily. The hyponatremia is due to Dehydration/hypovolemia. The patient's sodium results have been reviewed and are listed below.  Recent Labs   Lab 03/15/24  [id number]

## 2024-03-15 NOTE — ASSESSMENT & PLAN NOTE
SLP on board, recommending strict NPO  - Continue tube feeds for now via NG tube, nutrition on board  - As above, consider PEG tube if not improving and alligns with GOC

## 2024-03-15 NOTE — PLAN OF CARE
Patient oriented to self. VSS. SpO2 > 95% on room air. Afebrile. T-max 99.5. Accuchecks q4. 2 BM so far this shift. TF infusing to R NG at 10 ml/hr. Green heel boots on. Pt experiencing frequent PVCs on bedside telemetry. Team contacted and 2g mag + 20 mEq IV K+ ordered. Last mag was 1.6 and k+ was 3.2. Pt in specialty bed locked and in lowest settings. Bedside alarms audible.

## 2024-03-16 LAB
ALBUMIN SERPL BCP-MCNC: 1.4 G/DL (ref 3.5–5.2)
ALP SERPL-CCNC: 104 U/L (ref 55–135)
ALT SERPL W/O P-5'-P-CCNC: 19 U/L (ref 10–44)
ANION GAP SERPL CALC-SCNC: 6 MMOL/L (ref 8–16)
AST SERPL-CCNC: 37 U/L (ref 10–40)
BASOPHILS # BLD AUTO: 0.01 K/UL (ref 0–0.2)
BASOPHILS NFR BLD: 0.1 % (ref 0–1.9)
BILIRUB SERPL-MCNC: 0.5 MG/DL (ref 0.1–1)
BUN SERPL-MCNC: 17 MG/DL (ref 8–23)
CALCIUM SERPL-MCNC: 9.1 MG/DL (ref 8.7–10.5)
CHLORIDE SERPL-SCNC: 104 MMOL/L (ref 95–110)
CO2 SERPL-SCNC: 23 MMOL/L (ref 23–29)
CREAT SERPL-MCNC: 0.8 MG/DL (ref 0.5–1.4)
DIFFERENTIAL METHOD BLD: ABNORMAL
EOSINOPHIL # BLD AUTO: 0.1 K/UL (ref 0–0.5)
EOSINOPHIL NFR BLD: 1 % (ref 0–8)
ERYTHROCYTE [DISTWIDTH] IN BLOOD BY AUTOMATED COUNT: 17.3 % (ref 11.5–14.5)
EST. GFR  (NO RACE VARIABLE): >60 ML/MIN/1.73 M^2
GLUCOSE SERPL-MCNC: 94 MG/DL (ref 70–110)
HCT VFR BLD AUTO: 21.7 % (ref 40–54)
HGB BLD-MCNC: 7 G/DL (ref 14–18)
IMM GRANULOCYTES # BLD AUTO: 0.13 K/UL (ref 0–0.04)
IMM GRANULOCYTES NFR BLD AUTO: 1.3 % (ref 0–0.5)
LYMPHOCYTES # BLD AUTO: 0.7 K/UL (ref 1–4.8)
LYMPHOCYTES NFR BLD: 7.6 % (ref 18–48)
MAGNESIUM SERPL-MCNC: 1.8 MG/DL (ref 1.6–2.6)
MCH RBC QN AUTO: 27.3 PG (ref 27–31)
MCHC RBC AUTO-ENTMCNC: 32.3 G/DL (ref 32–36)
MCV RBC AUTO: 85 FL (ref 82–98)
MONOCYTES # BLD AUTO: 1 K/UL (ref 0.3–1)
MONOCYTES NFR BLD: 10.4 % (ref 4–15)
NEUTROPHILS # BLD AUTO: 7.7 K/UL (ref 1.8–7.7)
NEUTROPHILS NFR BLD: 79.6 % (ref 38–73)
NRBC BLD-RTO: 0 /100 WBC
PHOSPHATE SERPL-MCNC: 2.5 MG/DL (ref 2.7–4.5)
PLATELET # BLD AUTO: 503 K/UL (ref 150–450)
PMV BLD AUTO: 10.3 FL (ref 9.2–12.9)
POCT GLUCOSE: 110 MG/DL (ref 70–110)
POCT GLUCOSE: 113 MG/DL (ref 70–110)
POCT GLUCOSE: 119 MG/DL (ref 70–110)
POCT GLUCOSE: 134 MG/DL (ref 70–110)
POTASSIUM SERPL-SCNC: 3.7 MMOL/L (ref 3.5–5.1)
PROT SERPL-MCNC: 6.4 G/DL (ref 6–8.4)
RBC # BLD AUTO: 2.56 M/UL (ref 4.6–6.2)
SODIUM SERPL-SCNC: 133 MMOL/L (ref 136–145)
WBC # BLD AUTO: 9.72 K/UL (ref 3.9–12.7)

## 2024-03-16 PROCEDURE — 20600001 HC STEP DOWN PRIVATE ROOM

## 2024-03-16 PROCEDURE — A9585 GADOBUTROL INJECTION: HCPCS | Performed by: STUDENT IN AN ORGANIZED HEALTH CARE EDUCATION/TRAINING PROGRAM

## 2024-03-16 PROCEDURE — 27201109 HC SYSTEM FECAL MANAGEMENT

## 2024-03-16 PROCEDURE — 83735 ASSAY OF MAGNESIUM: CPT

## 2024-03-16 PROCEDURE — 80053 COMPREHEN METABOLIC PANEL: CPT

## 2024-03-16 PROCEDURE — 84100 ASSAY OF PHOSPHORUS: CPT

## 2024-03-16 PROCEDURE — 25000003 PHARM REV CODE 250: Performed by: STUDENT IN AN ORGANIZED HEALTH CARE EDUCATION/TRAINING PROGRAM

## 2024-03-16 PROCEDURE — 94761 N-INVAS EAR/PLS OXIMETRY MLT: CPT

## 2024-03-16 PROCEDURE — 85025 COMPLETE CBC W/AUTO DIFF WBC: CPT

## 2024-03-16 PROCEDURE — 25000003 PHARM REV CODE 250

## 2024-03-16 PROCEDURE — 27000207 HC ISOLATION

## 2024-03-16 PROCEDURE — 99900035 HC TECH TIME PER 15 MIN (STAT)

## 2024-03-16 PROCEDURE — 99232 SBSQ HOSP IP/OBS MODERATE 35: CPT | Mod: ,,, | Performed by: PSYCHIATRY & NEUROLOGY

## 2024-03-16 PROCEDURE — 63600175 PHARM REV CODE 636 W HCPCS

## 2024-03-16 PROCEDURE — 25500020 PHARM REV CODE 255: Performed by: STUDENT IN AN ORGANIZED HEALTH CARE EDUCATION/TRAINING PROGRAM

## 2024-03-16 PROCEDURE — 36415 COLL VENOUS BLD VENIPUNCTURE: CPT

## 2024-03-16 RX ORDER — GADOBUTROL 604.72 MG/ML
10 INJECTION INTRAVENOUS
Status: COMPLETED | OUTPATIENT
Start: 2024-03-16 | End: 2024-03-16

## 2024-03-16 RX ORDER — INSULIN ASPART 100 [IU]/ML
0-5 INJECTION, SOLUTION INTRAVENOUS; SUBCUTANEOUS EVERY 6 HOURS
Status: DISCONTINUED | OUTPATIENT
Start: 2024-03-16 | End: 2024-03-18

## 2024-03-16 RX ADMIN — CHLORHEXIDINE GLUCONATE 0.12% ORAL RINSE 15 ML: 1.2 LIQUID ORAL at 10:03

## 2024-03-16 RX ADMIN — VANCOMYCIN HYDROCHLORIDE 125 MG: KIT at 06:03

## 2024-03-16 RX ADMIN — MICONAZOLE NITRATE 2 % TOPICAL POWDER: at 10:03

## 2024-03-16 RX ADMIN — SILODOSIN 4 MG: 4 CAPSULE ORAL at 10:03

## 2024-03-16 RX ADMIN — GADOBUTROL 10 ML: 604.72 INJECTION INTRAVENOUS at 05:03

## 2024-03-16 RX ADMIN — ATORVASTATIN CALCIUM 40 MG: 40 TABLET, FILM COATED ORAL at 10:03

## 2024-03-16 RX ADMIN — VANCOMYCIN HYDROCHLORIDE 125 MG: KIT at 10:03

## 2024-03-16 RX ADMIN — ENOXAPARIN SODIUM 40 MG: 40 INJECTION SUBCUTANEOUS at 06:03

## 2024-03-16 NOTE — SUBJECTIVE & OBJECTIVE
Subjective:     Interval History: remains encephalopathic. Not back to baseline per family at bedside. Spiked fever this AM.    Current Neurological Medications: none    Current Facility-Administered Medications   Medication Dose Route Frequency Provider Last Rate Last Admin    0.9%  NaCl infusion   Intravenous Continuous Aiden Reyes MD   Stopped at 03/07/24 1145    0.9%  NaCl infusion   Intravenous Continuous Aiden Reyes MD   Stopped at 03/12/24 0518    atorvastatin tablet 40 mg  40 mg Oral Daily Shelbie Souza MD   40 mg at 03/15/24 0843    dextrose 10% bolus 125 mL 125 mL  12.5 g Intravenous PRN Lalita Miguel MD   Stopped at 03/11/24 2131    dextrose 10% bolus 250 mL 250 mL  25 g Intravenous PRN Lalita Miguel MD        enoxaparin injection 40 mg  40 mg Subcutaneous Q24H (prophylaxis, 1700) Jarod Wood MD   40 mg at 03/15/24 1807    glucagon (human recombinant) injection 1 mg  1 mg Intramuscular PRN Lalita Miguel MD        glucose chewable tablet 16 g  16 g Per OG tube PRN Abdirizak Carpenter DO        glucose chewable tablet 24 g  24 g Per OG tube PRN Abdirizak Carpenter DO        insulin aspart U-100 pen 0-5 Units  0-5 Units Subcutaneous QID (AC + HS) PRLalita Avila MD        lactated ringers bolus 500 mL  500 mL Intravenous Once Anum Sousa MD        miconazole NITRATE 2 % top powder   Topical (Top) BID Aiden Reyes MD   Given at 03/15/24 1100    naloxone 0.4 mg/mL injection 0.02 mg  0.02 mg Intravenous PRN Lalita Miguel MD   0.4 mg at 03/04/24 1157    senna-docusate 8.6-50 mg per tablet 1 tablet  1 tablet Per NG tube BID PRN Anum Sousa MD        silodosin capsule 4 mg  4 mg Oral Daily Shelbie Souza MD   4 mg at 03/15/24 0843    sodium chloride 0.9% flush 10 mL  10 mL Intravenous Q12H PRN Lalita Miguel MD           Review of Systems   Unable to perform ROS: Mental status change   Psychiatric/Behavioral:  Positive for confusion.       Objective:     Vital Signs (Most Recent):  Temp: 100.1 °F (37.8 °C) (03/15/24 1611)  Pulse: 107 (03/15/24 1611)  Resp: 18 (03/15/24 1611)  BP: 126/76 (03/15/24 1611)  SpO2: 96 % (03/15/24 1611) Vital Signs (24h Range):  Temp:  [98.6 °F (37 °C)-100.1 °F (37.8 °C)] 100.1 °F (37.8 °C)  Pulse:  [] 107  Resp:  [18-23] 18  SpO2:  [94 %-99 %] 96 %  BP: (103-126)/(55-79) 126/76     Weight: 109.5 kg (241 lb 6.5 oz)  Body mass index is 35.65 kg/m².     Physical Exam  Vitals reviewed.   Constitutional:       General: He is not in acute distress.     Appearance: He is ill-appearing.   HENT:      Mouth/Throat:      Mouth: Mucous membranes are dry.   Eyes:      Extraocular Movements: Extraocular movements intact.      Pupils: Pupils are equal, round, and reactive to light.   Pulmonary:      Effort: Pulmonary effort is normal. No respiratory distress.   Neurological:      General: No focal deficit present.      Mental Status: He is alert. He is disoriented.      GCS: GCS eye subscore is 4. GCS verbal subscore is 4. GCS motor subscore is 6.      Cranial Nerves: Cranial nerves 2-12 are intact.      Motor: Weakness present.          NEUROLOGICAL EXAMINATION:     MENTAL STATUS   Oriented to person.   Disoriented to place.   Disoriented to time.   Attention: decreased. Concentration: decreased.   Level of consciousness: drowsy    CRANIAL NERVES   Cranial nerves II through XII intact.     CN III, IV, VI   Pupils are equal, round, and reactive to light.    MOTOR EXAM   Right arm tone: increased  Left arm tone: increased       BUE contractures. Follows commands. In restraints.     SENSORY EXAM   Light touch normal.     GAIT AND COORDINATION     Gait  Gait: (deferred)      Significant Labs: All pertinent lab results from the past 24 hours have been reviewed.    Significant Imaging: I have reviewed all pertinent imaging results/findings within the past 24 hours.

## 2024-03-16 NOTE — ASSESSMENT & PLAN NOTE
Significant diarrhea reported by nursing AM 3/15. No bowel regimen for over 48 hours and recent abx use. Concern for c diff vs tube feeds > c diff positive  - rectal tube in place  - oral vanc started 3/16

## 2024-03-16 NOTE — ASSESSMENT & PLAN NOTE
74 y.o. male with history of stroke with residual right sided deficits, chronic indwelling bryant, diabetes, and hypertension admitted after initial presentation from SNF with altered mental status. Patient was hypothermic to 87.9, hypotensive w/ MAP 55, and hypoglycemic in the 50s. Hospital course notable for intubation, UTI, and AV block. MRI Brain done the morning after presentation showed chronic white matter changes and remote infarct. MRI CTL spine w/ L2-L3 chronic discitis and C3-C6 edema and post-contrast enhancement c/f chronic spondylodiscitis. Patient extubated on 3/10. On 3/11 is following some commands but remains disoriented and encephalopathic. Exam fairly unchanged on 3/15.    Neurology asked to re-comment on patient's encephalopathy after stepping down from ICU and remaining off from his baseline. Per family he had good and bad days in the nursing home, some similar to his current state but other days where he was much better and able to have conversations. Has not walked on his own since September.    On 3/16 patient more alert, following commands, talkative, oriented to self and location; still disoriented to time/date  Found to have c-diff and started on vanc  MRI was repeated and remains motion degraded    Recommendations:  -Seems unlikely that MRI findings are contributing to his current condition and favor chronic degenerative changes  -Continue treatment of other metabolic/infectious problems  -Very difficult to obtain a consistent baseline from family members   -F/u w/ neurology outpatient  -Neurology will sign off please contact us with any questions

## 2024-03-16 NOTE — ASSESSMENT & PLAN NOTE
Improving   Presented from NH with altered mentation, initially intubated for airway protection 3/4, extubated 3/10. Likely acute on chronic in the setting of history of CVA with likely low cognitive reserve at baseline, and exacerbated by UTI. Varying accounts of baseline makes evaluation difficult. Per daughter: Has not been functionally independent since November (before December admission), currently lives in assisted living and requires assstance with ADLs. Mobility significantly impaird. AOx3 4-5 days out of the week, at other times will be confused and disoriented. 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 CTH on admission unremarkable for acute process. MRI spine obtained on 3/10 showing chronic dicitis  Plan:  -Status post treatment for UTI  -Neurology on board, expect extended return to baseline given significant infection in the setting of fairly low cognitive reserve, continue treatment of active medical issues  -Contacted neuro 3/13 to discuss LP/further workup since patient not near baseline   Per radiology, patient's anatomy should not limit bedside LP attempt.    Neurology to attempt bedside LP 3/15 > deferred for repeat imaging   Repeat imaging limited by motion artifact, neurology deferring further intervention as mental status is improved 3/16  -NG tube placed, may consider PEG if aligns with GOC

## 2024-03-16 NOTE — PLAN OF CARE
Oriented to self. Pt resulted positive for Cdiff--special contact precautions in place. NG tube to R nare with continuous tube feeds at 40 mL/hr. Pt significantly impaired with contracted upper extremities. Turning/repositioning/weight shifting completed throughout shift. Rivera and FMS in place. Neuro checks q4. Residual checks q4. Water boluses q4. Accuchecks q4.  Denies pain. Wound care provided to groin and sacrum per orders.     Specialty bed in use. Fall precautions maintained; bed alarm set. Aspiration precautions maintained, HOB elevated 45 degrees. Suctioning at bed side. Continuous pulse ox and telemetry monitoring.    No concerns voiced at this time.

## 2024-03-16 NOTE — PROGRESS NOTES
[facility] - [facility] Stepdown  Neurology  Progress Note    Patient Name: Bj Pate Jr.  MRN: [id number]  Admission Date: 3/4/2024  Hospital Length of Stay: 12 days  Code Status: Full Code   Attending Provider: Anum Sousa MD  Primary Care Physician: Jose Antonio Foster MD   Principal Problem:Acute encephalopathy    HPI:   Neurology consulted for encephalopathy evaluation in Bj Pate Jr., a 74 y.o. male with history of stroke with residual right sided deficits, chronic indwelling bryant, diabetes, and hypertension admitted after initial presentation from SNF with altered mental status. Patient was hypothermic to 87.9, hypotensive w/ MAP 55, and hypoglycemic in the 50s. Hospital course notable for intubation, UTI, and AV block. MRI Brain done the morning after presentation showed chronic white matter changes and remote infarct. MRI CTL spine w/ L2-L3 chronic discitis and C3-C6 edema and post-contrast enhancement c/f chronic spondylodiscitis.    Overview/Hospital Course:  No notes on file        Subjective:     Interval History: started on tx for c-diff. More alert and talkative this AM. Repeat MRI remains motion degraded.    Current Neurological Medications: none    Current Facility-Administered Medications   Medication Dose Route Frequency Provider Last Rate Last Admin    0.9%  NaCl infusion   Intravenous Continuous Aiden Reyes MD   Stopped at 03/07/24 1145    0.9%  NaCl infusion   Intravenous Continuous Aiden Reyes MD   Stopped at 03/12/24 0518    atorvastatin tablet 40 mg  40 mg Oral Daily Shelbie Souza MD   40 mg at 03/15/24 0843    chlorhexidine 0.12 % solution 15 mL  15 mL Mouth/Throat BID Anum Sousa MD        dextrose 10% bolus 125 mL 125 mL  12.5 g Intravenous PRN Lalita Miguel MD   Stopped at 03/11/24 2131    dextrose 10% bolus 250 mL 250 mL  25 g Intravenous PRN Lalita Miguel MD        enoxaparin injection 40 mg  40 mg Subcutaneous Q24H (prophylaxis,  1700) Jarod Wood MD   40 mg at 03/15/24 1807    glucagon (human recombinant) injection 1 mg  1 mg Intramuscular PRN Lalita Miguel MD        glucose chewable tablet 16 g  16 g Per OG tube PRN Abdirizak Carpenter DO        glucose chewable tablet 24 g  24 g Per OG tube PRN Abdirizak Carpenter DO        insulin aspart U-100 pen 0-5 Units  0-5 Units Subcutaneous Q6H Anum Sousa MD        miconazole NITRATE 2 % top powder   Topical (Top) BID Aiden Reyes MD   Given at 03/15/24 2150    naloxone 0.4 mg/mL injection 0.02 mg  0.02 mg Intravenous PRN Lalita Miguel MD   0.4 mg at 03/04/24 1157    senna-docusate 8.6-50 mg per tablet 1 tablet  1 tablet Per NG tube BID PRN Anum Sousa MD        silodosin capsule 4 mg  4 mg Oral Daily Shelbie Souza MD   4 mg at 03/15/24 0843    sodium chloride 0.9% flush 10 mL  10 mL Intravenous Q12H PRN Lalita Miguel MD        vancomycin 125 mg/5 mL oral solution 125 mg  125 mg Per NG tube Q6H Anum Sousa MD           Review of Systems   Unable to perform ROS: Mental status change   Psychiatric/Behavioral:  Positive for confusion.      Objective:     Vital Signs (Most Recent):  Temp: 98.9 °F (37.2 °C) (03/16/24 0613)  Pulse: 83 (03/16/24 0711)  Resp: 18 (03/16/24 0613)  BP: 125/76 (03/16/24 0613)  SpO2: 100 % (03/16/24 0615) Vital Signs (24h Range):  Temp:  [98.1 °F (36.7 °C)-100.1 °F (37.8 °C)] 98.9 °F (37.2 °C)  Pulse:  [] 83  Resp:  [16-23] 18  SpO2:  [94 %-100 %] 100 %  BP: (101-126)/(57-76) 125/76     Weight: 109.5 kg (241 lb 6.5 oz)  Body mass index is 35.65 kg/m².     Physical Exam  Vitals reviewed.   Constitutional:       General: He is not in acute distress.     Appearance: He is not ill-appearing.   HENT:      Mouth/Throat:      Mouth: Mucous membranes are dry.   Eyes:      Extraocular Movements: Extraocular movements intact.      Pupils: Pupils are equal, round, and reactive to light.   Pulmonary:      Effort: Pulmonary effort is normal. No  respiratory distress.   Neurological:      General: No focal deficit present.      Mental Status: He is alert. He is disoriented.      GCS: GCS eye subscore is 4. GCS verbal subscore is 4. GCS motor subscore is 6.      Cranial Nerves: Cranial nerves 2-12 are intact.      Motor: Weakness present.   Psychiatric:         Speech: Speech is slurred.          NEUROLOGICAL EXAMINATION:     MENTAL STATUS   Oriented to person.   Oriented to city.   Disoriented to time.   Attention: decreased. Concentration: decreased.   Speech: slurred   Level of consciousness: alert    CRANIAL NERVES   Cranial nerves II through XII intact.     CN III, IV, VI   Pupils are equal, round, and reactive to light.    MOTOR EXAM   Right arm tone: increased  Left arm tone: increased       BUE contractures. Follows commands. In restraints.     SENSORY EXAM   Light touch normal.     GAIT AND COORDINATION     Gait  Gait: (deferred)      Significant Labs: All pertinent lab results from the past 24 hours have been reviewed.    Significant Imaging: I have reviewed all pertinent imaging results/findings within the past 24 hours.    Results for orders placed or performed during the hospital encounter of 03/04/24 (from the past 2160 hour(s))   MRI Spine Cervical-Thoracic-Lumbar W W/O Contrast (XPD)    Impression    Left L2-L3 intervertebral disc space enhancement slightly more striking compared to the prior exam, could represent chronic discitis or granulomatous tissue.    Stable edema and mild postcontrast enhancement C3 through C5-6, could represent chronic spondylo discitis, cannot be compared to the prior study where there was significant motion artifacts.  Minimal dural enhancement at this level could be reactive or ongoing chronic infection.    Left facet joint osseous hypertrophy and postcontrast enhancement L4-5 similar to prior study continue follow-up advised.      Electronically signed by: Laure Reynoso  MD  Date:    03/09/2024  Time:    14:38   MRI Cervical Spine Demyelinating W W/O Contrast    Impression    Significant motion distorted examination.  Within limits of the study there is no cervical spinal cord signal abnormality.  No abnormal intrathecal enhancement.    Continued nonspecific edema and enhancement C3 through C5 vertebra with probable additional component within C6.  Overall nonspecific and may be sequela degenerative process with underlying inflammatory/infectious process not excluded.  No fluid signal within the disc spaces to suggest definite spondylo discitis.  Continued multilevel degenerative change as detailed above.  Otherwise significant motion distorted examination as detailed above      Electronically signed by: Maldonado Rodriguez DO  Date:    03/16/2024  Time:    07:05   MRI Brain W WO Contrast    Impression    No acute intracranial abnormalities identified.    Chronic changes as detailed above.    Electronically signed by resident: Tahmina Liz  Date:    03/05/2024  Time:    04:34    Electronically signed by: Marcial De La Rosa  Date:    03/05/2024  Time:    05:24     *Note: Due to a large number of results and/or encounters for the requested time period, some results have not been displayed. A complete set of results can be found in Results Review.       Assessment and Plan:     * Acute encephalopathy  74 y.o. male with history of stroke with residual right sided deficits, chronic indwelling bryant, diabetes, and hypertension admitted after initial presentation from SNF with altered mental status. Patient was hypothermic to 87.9, hypotensive w/ MAP 55, and hypoglycemic in the 50s. Hospital course notable for intubation, UTI, and AV block. MRI Brain done the morning after presentation showed chronic white matter changes and remote infarct. MRI CTL spine w/ L2-L3 chronic discitis and C3-C6 edema and post-contrast enhancement c/f chronic spondylodiscitis. Patient extubated on 3/10. On 3/11 is  following some commands but remains disoriented and encephalopathic. Exam fairly unchanged on 3/15.    Neurology asked to re-comment on patient's encephalopathy after stepping down from ICU and remaining off from his baseline. Per family he had good and bad days in the nursing home, some similar to his current state but other days where he was much better and able to have conversations. Has not walked on his own since September.    On 3/16 patient more alert, following commands, talkative, oriented to self and location; still disoriented to time/date  Found to have c-diff and started on vanc  MRI was repeated and remains motion degraded    Recommendations:  -Seems unlikely that MRI findings are contributing to his current condition and favor chronic degenerative changes  -Continue treatment of other metabolic/infectious problems  -Very difficult to obtain a consistent baseline from family members   -F/u w/ neurology outpatient  -Neurology will sign off please contact us with any questions        VTE Risk Mitigation (From admission, onward)           Ordered     enoxaparin injection 40 mg  Every 24 hours         03/12/24 1008     IP VTE HIGH RISK PATIENT  Once         03/04/24 0316     Place sequential compression device  Until discontinued         03/04/24 0316                    Rolf Peace MD  Neurology  [facility] - [facility] Step[facility]

## 2024-03-16 NOTE — SUBJECTIVE & OBJECTIVE
Subjective:     Interval History: started on tx for c-diff. More alert and talkative this AM. Repeat MRI remains motion degraded.    Current Neurological Medications: none    Current Facility-Administered Medications   Medication Dose Route Frequency Provider Last Rate Last Admin    0.9%  NaCl infusion   Intravenous Continuous Aiden Reyes MD   Stopped at 03/07/24 1145    0.9%  NaCl infusion   Intravenous Continuous Aiden Reyes MD   Stopped at 03/12/24 0518    atorvastatin tablet 40 mg  40 mg Oral Daily Shelbie Souza MD   40 mg at 03/15/24 0843    chlorhexidine 0.12 % solution 15 mL  15 mL Mouth/Throat BID Anum Sousa MD        dextrose 10% bolus 125 mL 125 mL  12.5 g Intravenous PRN Lalita Miguel MD   Stopped at 03/11/24 2131    dextrose 10% bolus 250 mL 250 mL  25 g Intravenous PRN Lalita Miguel MD        enoxaparin injection 40 mg  40 mg Subcutaneous Q24H (prophylaxis, 1700) Jarod Wood MD   40 mg at 03/15/24 1807    glucagon (human recombinant) injection 1 mg  1 mg Intramuscular PRN Lalita Miguel MD        glucose chewable tablet 16 g  16 g Per OG tube PRN Abdirizak Carpenter DO        glucose chewable tablet 24 g  24 g Per OG tube PRN Abdirizak Carpenter DO        insulin aspart U-100 pen 0-5 Units  0-5 Units Subcutaneous Q6H Anum Sousa MD        miconazole NITRATE 2 % top powder   Topical (Top) BID Aiden Reyes MD   Given at 03/15/24 2150    naloxone 0.4 mg/mL injection 0.02 mg  0.02 mg Intravenous PRN Lalita Miguel MD   0.4 mg at 03/04/24 1157    senna-docusate 8.6-50 mg per tablet 1 tablet  1 tablet Per NG tube BID PRN Anum Sousa MD        silodosin capsule 4 mg  4 mg Oral Daily Shelbie Souza MD   4 mg at 03/15/24 0843    sodium chloride 0.9% flush 10 mL  10 mL Intravenous Q12H PRN Lalita Miguel MD        vancomycin 125 mg/5 mL oral solution 125 mg  125 mg Per NG tube Q6H Sousa, Anum, MD           Review of Systems   Unable to  perform ROS: Mental status change   Psychiatric/Behavioral:  Positive for confusion.      Objective:     Vital Signs (Most Recent):  Temp: 98.9 °F (37.2 °C) (03/16/24 0613)  Pulse: 83 (03/16/24 0711)  Resp: 18 (03/16/24 0613)  BP: 125/76 (03/16/24 0613)  SpO2: 100 % (03/16/24 0615) Vital Signs (24h Range):  Temp:  [98.1 °F (36.7 °C)-100.1 °F (37.8 °C)] 98.9 °F (37.2 °C)  Pulse:  [] 83  Resp:  [16-23] 18  SpO2:  [94 %-100 %] 100 %  BP: (101-126)/(57-76) 125/76     Weight: 109.5 kg (241 lb 6.5 oz)  Body mass index is 35.65 kg/m².     Physical Exam  Vitals reviewed.   Constitutional:       General: He is not in acute distress.     Appearance: He is not ill-appearing.   HENT:      Mouth/Throat:      Mouth: Mucous membranes are dry.   Eyes:      Extraocular Movements: Extraocular movements intact.      Pupils: Pupils are equal, round, and reactive to light.   Pulmonary:      Effort: Pulmonary effort is normal. No respiratory distress.   Neurological:      General: No focal deficit present.      Mental Status: He is alert. He is disoriented.      GCS: GCS eye subscore is 4. GCS verbal subscore is 4. GCS motor subscore is 6.      Cranial Nerves: Cranial nerves 2-12 are intact.      Motor: Weakness present.   Psychiatric:         Speech: Speech is slurred.          NEUROLOGICAL EXAMINATION:     MENTAL STATUS   Oriented to person.   Oriented to city.   Disoriented to time.   Attention: decreased. Concentration: decreased.   Speech: slurred   Level of consciousness: alert    CRANIAL NERVES   Cranial nerves II through XII intact.     CN III, IV, VI   Pupils are equal, round, and reactive to light.    MOTOR EXAM   Right arm tone: increased  Left arm tone: increased       BUE contractures. Follows commands. In restraints.     SENSORY EXAM   Light touch normal.     GAIT AND COORDINATION     Gait  Gait: (deferred)      Significant Labs: All pertinent lab results from the past 24 hours have been reviewed.    Significant  Imaging: I have reviewed all pertinent imaging results/findings within the past 24 hours.    Results for orders placed or performed during the hospital encounter of 03/04/24 (from the past 2160 hour(s))   MRI Spine Cervical-Thoracic-Lumbar W W/O Contrast (XPD)    Impression    Left L2-L3 intervertebral disc space enhancement slightly more striking compared to the prior exam, could represent chronic discitis or granulomatous tissue.    Stable edema and mild postcontrast enhancement C3 through C5-6, could represent chronic spondylo discitis, cannot be compared to the prior study where there was significant motion artifacts.  Minimal dural enhancement at this level could be reactive or ongoing chronic infection.    Left facet joint osseous hypertrophy and postcontrast enhancement L4-5 similar to prior study continue follow-up advised.      Electronically signed by: Laure Reynoso MD  Date:    03/09/2024  Time:    14:38   MRI Cervical Spine Demyelinating W W/O Contrast    Impression    Significant motion distorted examination.  Within limits of the study there is no cervical spinal cord signal abnormality.  No abnormal intrathecal enhancement.    Continued nonspecific edema and enhancement C3 through C5 vertebra with probable additional component within C6.  Overall nonspecific and may be sequela degenerative process with underlying inflammatory/infectious process not excluded.  No fluid signal within the disc spaces to suggest definite spondylo discitis.  Continued multilevel degenerative change as detailed above.  Otherwise significant motion distorted examination as detailed above      Electronically signed by: Maldonado Rodriguez DO  Date:    03/16/2024  Time:    07:05   MRI Brain W WO Contrast    Impression    No acute intracranial abnormalities identified.    Chronic changes as detailed above.    Electronically signed by resident: Tahmina Liz  Date:    03/05/2024  Time:    04:34    Electronically signed  by: Marcial De La Rosa  Date:    03/05/2024  Time:    05:24     *Note: Due to a large number of results and/or encounters for the requested time period, some results have not been displayed. A complete set of results can be found in Results Review.

## 2024-03-16 NOTE — PROGRESS NOTES
[facility]  Progress Note    Patient Name: Bj Pate Jr.  MRN: [id number]  Patient Class: IP- Inpatient   Admission Date: 3/4/2024  Length of Stay: 12 days  Attending Physician: Anum Sousa MD  Primary Care Provider: Jose Antonio Foster MD        Subjective:     Principal Problem:Acute encephalopathy        HPI:  73M with PMH CVA with R sided deficits, DM, HTN who presents to the ED from SNF for AMS. Per chart, he was more somnolent than usual over the last 2 days. At baseline he is verbal and follows commands but does not ambulate. On arrival to ED he was hypothermic to 87.9F and found to be hypoglycemic to the 50s. Intermittently hypotensive to MAP 55. Patient is awake but does not interact meaningfully on exam. Chronic Rivera was initially draining cloudy urine with sediment, Rivera exchanged. Initial labs without leukocytosis, CMP unremarkable, VBG with pH 7.49/30.3/38.9, lactate normal. Received 1L NS bolus through warming pump. Initially admitted to , critical care consulted for severe hypothermia with hypotension.    Overview/Hospital Course:  Admitted to the ED from SNF with acute encephalopathy. Initially admitted to hospital medicine however critical care consulted for severe hypothermia and hypotension.  Intubated 3/4 for airway protection, eventually extubated 3/10.  Workup notable for Citrobacter farmeri UTI, antibiotics tailored accordingly.  Neurology has been on board due to ongoing encephalopathy.  Recommending continued treatment of active medical issues.  Admission further complicated by heart block, likely Wenckebach for which EP was consulted. Likely indicative of periods of increased vagal tone or disordered sleep breathing, no evidnece of high degree block recorded, no indication for PPM. In addition, has been relatively intolerant of p.o. intake, NG tube has been placed with tube feeds started.  Failed MBSS. May possibly consider PEG if not  improved.    Neurology following, deferred LP for repeat spinal imaging. Quality limited by motion artifact. Patient's mental status improving slowly. Awaiting further SLP evaluation.     Diarrhea reported by nursing 3/15, c diff positive, started on oral vanc.     Interval History: Patient alert, oriented to person, place, time, some memory issues. Started on c diff treatment. Awaiting SLP eval. Denies chest pain, SOB, n/v.     Review of Systems  Objective:     Vital Signs (Most Recent):  Temp: 98.9 °F (37.2 °C) (03/16/24 0613)  Pulse: 83 (03/16/24 0711)  Resp: 18 (03/16/24 0613)  BP: 125/76 (03/16/24 0613)  SpO2: 100 % (03/16/24 0615) Vital Signs (24h Range):  Temp:  [98.1 °F (36.7 °C)-100.1 °F (37.8 °C)] 98.9 °F (37.2 °C)  Pulse:  [] 83  Resp:  [16-21] 18  SpO2:  [94 %-100 %] 100 %  BP: (101-126)/(59-76) 125/76     Weight: 109.5 kg (241 lb 6.5 oz)  Body mass index is 35.65 kg/m².    Intake/Output Summary (Last 24 hours) at 3/16/2024 1140  Last data filed at 3/16/2024 0628  Gross per 24 hour   Intake 1650 ml   Output 1050 ml   Net 600 ml         Physical Exam  Vitals and nursing note reviewed.   Constitutional:       General: He is not in acute distress.     Appearance: He is not ill-appearing.   HENT:      Mouth/Throat:      Mouth: Mucous membranes are dry.   Eyes:      Extraocular Movements: Extraocular movements intact.   Cardiovascular:      Rate and Rhythm: Normal rate and regular rhythm.      Heart sounds: Normal heart sounds. No murmur heard.  Pulmonary:      Effort: No respiratory distress.      Breath sounds: Normal breath sounds. No wheezing.   Abdominal:      General: There is no distension.      Palpations: Abdomen is soft.      Tenderness: There is no abdominal tenderness.   Musculoskeletal:         General: No tenderness.      Right lower leg: No edema.      Left lower leg: No edema.      Comments: BUE contractures   Skin:     General: Skin is warm and dry.   Neurological:      Mental Status:  He is alert and oriented to person, place, and time.   Psychiatric:         Mood and Affect: Mood normal.         Behavior: Behavior normal.             Significant Labs: All pertinent labs within the past 24 hours have been reviewed.    Significant Imaging: I have reviewed all pertinent imaging results/findings within the past 24 hours.    Assessment/Plan:      * Acute encephalopathy  Improving   Presented from NH with altered mentation, initially intubated for airway protection 3/4, extubated 3/10. Likely acute on chronic in the setting of history of CVA with likely low cognitive reserve at baseline, and exacerbated by UTI. Varying accounts of baseline makes evaluation difficult. Per daughter: Has not been functionally independent since November (before December admission), currently lives in assisted living and requires assstance with ADLs. Mobility significantly impaird. AOx3 4-5 days out of the week, at other times will be confused and disoriented. 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 CTH on admission unremarkable for acute process. MRI spine obtained on 3/10 showing chronic dicitis  Plan:  -Status post treatment for UTI  -Neurology on board, expect extended return to baseline given significant infection in the setting of fairly low cognitive reserve, continue treatment of active medical issues  -Contacted neuro 3/13 to discuss LP/further workup since patient not near baseline   Per radiology, patient's anatomy should not limit bedside LP attempt.    Neurology to attempt bedside LP 3/15 > deferred for repeat imaging   Repeat imaging limited by motion artifact, neurology deferring further intervention as mental status is improved 3/16  -NG tube placed, may consider PEG if aligns with [facility]        Dysphagia  SLP on board, recommending strict NPO  - Continue tube feeds for now via NG tube, nutrition on board  - As above, consider PEG tube if not  "improving and alligns with [facility]      Sepsis  This patient does have evidence of infective focus  My overall impression is sepsis.  Source: Urinary Tract  Antibiotics given-   Antibiotics (72h ago, onward)      Start     Stop Route Frequency Ordered    03/16/24 0800  vancomycin 125 mg/5 mL oral solution 125 mg  (C. difficile Infection (CDI) Treatment Order Panel)         03/26/24 1159 PER NG TUBE Every 6 hours 03/16/24 0747          Latest lactate reviewed-  No results for input(s): "LACTATE", "POCLAC" in the last 72 hours.  Organ dysfunction indicated by Encephalopathy    Fluid challenge Not needed - patient is not hypotensive      Post- resuscitation assessment No - Post resuscitation assessment not needed       Will Not start Pressors- Levophed for MAP of 65  Source control achieved by: antibiotics    Urinary tract infection without hematuria  now s/p 7 days zosyn  -maintain bryant for now      Diarrhea  Significant diarrhea reported by nursing AM 3/15. No bowel regimen for over 48 hours and recent abx use. Concern for c diff vs tube feeds > c diff positive  - rectal tube in place  - oral vanc started 3/16       Clostridium difficile infection  As above      Second degree AV block, Mobitz type I  -Concern for CHB overnight 3/6  -EP: Likely indicative of periods of increased vagal tone or disordered sleep breathing, no evidence of high-degree block noted therefore no indication for PPM/transcutaneous pacing        Hypoglycemia        Hypothermia  Resolved      Hemiparesis        Anemia  Patient's anemia is currently controlled. Has not received any PRBCs to date. Etiology likely d/t  chronic disease vs KIMMY  Current CBC reviewed-   Lab Results   Component Value Date    HGB 7.0 (L) 03/16/2024    HCT 21.7 (L) 03/16/2024     Monitor serial CBC and transfuse if patient becomes hemodynamically unstable, symptomatic or H/H drops below 7/21.    Hyponatremia  Patient has hyponatremia which is controlled,We will aim to correct " the sodium by 4-6mEq in 24 hours. We will monitor sodium Daily. The hyponatremia is due to Dehydration/hypovolemia. The patient's sodium results have been reviewed and are listed below.  Recent Labs   Lab 03/16/24  0645   *         History of CVA (cerebrovascular accident)  History of R sided hemiparesis from prior CVA        VTE Risk Mitigation (From admission, onward)           Ordered     enoxaparin injection 40 mg  Every 24 hours         03/12/24 1008     IP VTE HIGH RISK PATIENT  Once         03/04/24 0316     Place sequential compression device  Until discontinued         03/04/24 0316                    Discharge Planning   GLORIA: 3/19/2024     Code Status: Full Code   Is the patient medically ready for discharge?: No    Reason for patient still in hospital (select all that apply): Patient trending condition and Consult recommendations  Discharge Plan A: Skilled Nursing Facility ([facility])   Discharge Delays: None known at this time              Anum Sousa MD  Department of Hospital Medicine   [facility]

## 2024-03-16 NOTE — ASSESSMENT & PLAN NOTE
Patient has hyponatremia which is controlled,We will aim to correct the sodium by 4-6mEq in 24 hours. We will monitor sodium Daily. The hyponatremia is due to Dehydration/hypovolemia. The patient's sodium results have been reviewed and are listed below.  Recent Labs   Lab 03/16/24  0645   *

## 2024-03-16 NOTE — PROGRESS NOTES
[facility]  Neurology  Progress Note    Patient Name: Bj Pate Jr.  MRN: [id number]  Admission Date: 3/4/2024  Hospital Length of Stay: 11 days  Code Status: Full Code   Attending Provider: Anum Sousa MD  Primary Care Physician: Jose Antonio Foster MD   Principal Problem:Acute encephalopathy    HPI:   Neurology consulted for encephalopathy evaluation in Bj Pate Jr., a 74 y.o. male with history of stroke with residual right sided deficits, chronic indwelling bryant, diabetes, and hypertension admitted after initial presentation from SNF with altered mental status. Patient was hypothermic to 87.9, hypotensive w/ MAP 55, and hypoglycemic in the 50s. Hospital course notable for intubation, UTI, and AV block. MRI Brain done the morning after presentation showed chronic white matter changes and remote infarct. MRI CTL spine w/ L2-L3 chronic discitis and C3-C6 edema and post-contrast enhancement c/f chronic spondylodiscitis.      Subjective:     Interval History: remains encephalopathic. Not back to baseline per family at bedside. Spiked fever this AM.    Current Neurological Medications: none    Current Facility-Administered Medications   Medication Dose Route Frequency Provider Last Rate Last Admin    0.9%  NaCl infusion   Intravenous Continuous Aiden Reyes MD   Stopped at 03/07/24 1145    0.9%  NaCl infusion   Intravenous Continuous Aiden Reyes MD   Stopped at 03/12/24 0518    atorvastatin tablet 40 mg  40 mg Oral Daily Shelbie Souza MD   40 mg at 03/15/24 0843    dextrose 10% bolus 125 mL 125 mL  12.5 g Intravenous PRN Lalita Miguel MD   Stopped at 03/11/24 2131    dextrose 10% bolus 250 mL 250 mL  25 g Intravenous PRN Lalita Miguel MD        enoxaparin injection 40 mg  40 mg Subcutaneous Q24H (prophylaxis, 1700) Jarod Wood MD   40 mg at 03/15/24 1807    glucagon (human recombinant) injection 1 mg  1 mg Intramuscular PRN Lalita Miguel MD         glucose chewable tablet 16 g  16 g Per OG tube PRN Abdirizak Carpenter DO        glucose chewable tablet 24 g  24 g Per OG tube PRN Abdirizak Carpenter DO        insulin aspart U-100 pen 0-5 Units  0-5 Units Subcutaneous QID (AC + HS) PRLalita Avila MD        lactated ringers bolus 500 mL  500 mL Intravenous Once Anum Sousa MD        miconazole NITRATE 2 % top powder   Topical (Top) BID Aiden Reyes MD   Given at 03/15/24 1100    naloxone 0.4 mg/mL injection 0.02 mg  0.02 mg Intravenous PRN Lalita Miguel MD   0.4 mg at 03/04/24 1157    senna-docusate 8.6-50 mg per tablet 1 tablet  1 tablet Per NG tube BID PRN Anum Sousa MD        silodosin capsule 4 mg  4 mg Oral Daily Shelbie Souza MD   4 mg at 03/15/24 0843    sodium chloride 0.9% flush 10 mL  10 mL Intravenous Q12H PRN Lalita Miguel MD           Review of Systems   Unable to perform ROS: Mental status change   Psychiatric/Behavioral:  Positive for confusion.      Objective:     Vital Signs (Most Recent):  Temp: 100.1 °F (37.8 °C) (03/15/24 1611)  Pulse: 107 (03/15/24 1611)  Resp: 18 (03/15/24 1611)  BP: 126/76 (03/15/24 1611)  SpO2: 96 % (03/15/24 1611) Vital Signs (24h Range):  Temp:  [98.6 °F (37 °C)-100.1 °F (37.8 °C)] 100.1 °F (37.8 °C)  Pulse:  [] 107  Resp:  [18-23] 18  SpO2:  [94 %-99 %] 96 %  BP: (103-126)/(55-79) 126/76     Weight: 109.5 kg (241 lb 6.5 oz)  Body mass index is 35.65 kg/m².     Physical Exam  Vitals reviewed.   Constitutional:       General: He is not in acute distress.     Appearance: He is ill-appearing.   HENT:      Mouth/Throat:      Mouth: Mucous membranes are dry.   Eyes:      Extraocular Movements: Extraocular movements intact.      Pupils: Pupils are equal, round, and reactive to light.   Pulmonary:      Effort: Pulmonary effort is normal. No respiratory distress.   Neurological:      General: No focal deficit present.      Mental Status: He is alert. He is disoriented.      GCS: GCS eye  subscore is 4. GCS verbal subscore is 4. GCS motor subscore is 6.      Cranial Nerves: Cranial nerves 2-12 are intact.      Motor: Weakness present.          NEUROLOGICAL EXAMINATION:     MENTAL STATUS   Oriented to person.   Disoriented to place.   Disoriented to time.   Attention: decreased. Concentration: decreased.   Level of consciousness: drowsy    CRANIAL NERVES   Cranial nerves II through XII intact.     CN III, IV, VI   Pupils are equal, round, and reactive to light.    MOTOR EXAM   Right arm tone: increased  Left arm tone: increased       BUE contractures. Follows commands. In restraints.     SENSORY EXAM   Light touch normal.     GAIT AND COORDINATION     Gait  Gait: (deferred)      Significant Labs: All pertinent lab results from the past 24 hours have been reviewed.    Significant Imaging: I have reviewed all pertinent imaging results/findings within the past 24 hours.  Assessment and Plan:     * Acute encephalopathy  74 y.o. male with history of stroke with residual right sided deficits, chronic indwelling bryant, diabetes, and hypertension admitted after initial presentation from SNF with altered mental status. Patient was hypothermic to 87.9, hypotensive w/ MAP 55, and hypoglycemic in the 50s. Hospital course notable for intubation, UTI, and AV block. MRI Brain done the morning after presentation showed chronic white matter changes and remote infarct. MRI CTL spine w/ L2-L3 chronic discitis and C3-C6 edema and post-contrast enhancement c/f chronic spondylodiscitis. Patient extubated on 3/10. On 3/11 is following some commands but remains disoriented and encephalopathic. Exam fairly unchanged on 3/15.    Neurology asked to re-comment on patient's encephalopathy after stepping down from ICU and remaining off from his baseline. Per family he had good and bad days in the nursing home, some similar to his current state but other days where he was much better and able to have conversations. Has not walked  on his own since September.    Recommendations:  -Discussed imaging findings in c-spine with radiology. Currently they are unable to differentiate possible infection from motion artifact  -Recommend repeating MRI c-spine with and without contrast to further evaluate for spondylo-discitis at this level  -Can discuss further diagnostic measures once this is completed  -Regardless of cause of encephalopathy, would expect a slower return to baseline in this elderly patient with decreased cognitive reserve and prior strokes        VTE Risk Mitigation (From admission, onward)           Ordered     enoxaparin injection 40 mg  Every 24 hours         03/12/24 1008     IP VTE HIGH RISK PATIENT  Once         03/04/24 0316     Place sequential compression device  Until discontinued         03/04/24 0316                    Rolf Peace MD  Neurology  [facility]

## 2024-03-16 NOTE — SUBJECTIVE & OBJECTIVE
Interval History: Patient alert, oriented to person, place, time, some memory issues. Started on c diff treatment. Awaiting SLP eval. Denies chest pain, SOB, n/v.     Review of Systems  Objective:     Vital Signs (Most Recent):  Temp: 98.9 °F (37.2 °C) (03/16/24 0613)  Pulse: 83 (03/16/24 0711)  Resp: 18 (03/16/24 0613)  BP: 125/76 (03/16/24 0613)  SpO2: 100 % (03/16/24 0615) Vital Signs (24h Range):  Temp:  [98.1 °F (36.7 °C)-100.1 °F (37.8 °C)] 98.9 °F (37.2 °C)  Pulse:  [] 83  Resp:  [16-21] 18  SpO2:  [94 %-100 %] 100 %  BP: (101-126)/(59-76) 125/76     Weight: 109.5 kg (241 lb 6.5 oz)  Body mass index is 35.65 kg/m².    Intake/Output Summary (Last 24 hours) at 3/16/2024 1140  Last data filed at 3/16/2024 0628  Gross per 24 hour   Intake 1650 ml   Output 1050 ml   Net 600 ml         Physical Exam  Vitals and nursing note reviewed.   Constitutional:       General: He is not in acute distress.     Appearance: He is not ill-appearing.   HENT:      Mouth/Throat:      Mouth: Mucous membranes are dry.   Eyes:      Extraocular Movements: Extraocular movements intact.   Cardiovascular:      Rate and Rhythm: Normal rate and regular rhythm.      Heart sounds: Normal heart sounds. No murmur heard.  Pulmonary:      Effort: No respiratory distress.      Breath sounds: Normal breath sounds. No wheezing.   Abdominal:      General: There is no distension.      Palpations: Abdomen is soft.      Tenderness: There is no abdominal tenderness.   Musculoskeletal:         General: No tenderness.      Right lower leg: No edema.      Left lower leg: No edema.      Comments: BUE contractures   Skin:     General: Skin is warm and dry.   Neurological:      Mental Status: He is alert and oriented to person, place, and time.   Psychiatric:         Mood and Affect: Mood normal.         Behavior: Behavior normal.             Significant Labs: All pertinent labs within the past 24 hours have been reviewed.    Significant Imaging: I have  reviewed all pertinent imaging results/findings within the past 24 hours.

## 2024-03-16 NOTE — ASSESSMENT & PLAN NOTE
74 y.o. male with history of stroke with residual right sided deficits, chronic indwelling bryant, diabetes, and hypertension admitted after initial presentation from SNF with altered mental status. Patient was hypothermic to 87.9, hypotensive w/ MAP 55, and hypoglycemic in the 50s. Hospital course notable for intubation, UTI, and AV block. MRI Brain done the morning after presentation showed chronic white matter changes and remote infarct. MRI CTL spine w/ L2-L3 chronic discitis and C3-C6 edema and post-contrast enhancement c/f chronic spondylodiscitis. Patient extubated on 3/10. On 3/11 is following some commands but remains disoriented and encephalopathic. Exam fairly unchanged on 3/15.    Neurology asked to re-comment on patient's encephalopathy after stepping down from ICU and remaining off from his baseline. Per family he had good and bad days in the nursing home, some similar to his current state but other days where he was much better and able to have conversations. Has not walked on his own since September.    Recommendations:  -Discussed imaging findings in c-spine with radiology. Currently they are unable to differentiate possible infection from motion artifact  -Recommend repeating MRI c-spine with and without contrast to further evaluate for spondylo-discitis at this level  -Can discuss further diagnostic measures once this is completed  -Regardless of cause of encephalopathy, would expect a slower return to baseline in this elderly patient with decreased cognitive reserve and prior strokes

## 2024-03-16 NOTE — ASSESSMENT & PLAN NOTE
Patient's anemia is currently controlled. Has not received any PRBCs to date. Etiology likely d/t  chronic disease vs KIMMY  Current CBC reviewed-   Lab Results   Component Value Date    HGB 7.0 (L) 03/16/2024    HCT 21.7 (L) 03/16/2024     Monitor serial CBC and transfuse if patient becomes hemodynamically unstable, symptomatic or H/H drops below 7/21.

## 2024-03-17 LAB
ALBUMIN SERPL BCP-MCNC: 1.5 G/DL (ref 3.5–5.2)
ALP SERPL-CCNC: 102 U/L (ref 55–135)
ALT SERPL W/O P-5'-P-CCNC: 21 U/L (ref 10–44)
ANION GAP SERPL CALC-SCNC: 6 MMOL/L (ref 8–16)
AST SERPL-CCNC: 39 U/L (ref 10–40)
BASOPHILS # BLD AUTO: 0.01 K/UL (ref 0–0.2)
BASOPHILS NFR BLD: 0.1 % (ref 0–1.9)
BILIRUB SERPL-MCNC: 0.4 MG/DL (ref 0.1–1)
BUN SERPL-MCNC: 18 MG/DL (ref 8–23)
CALCIUM SERPL-MCNC: 8.7 MG/DL (ref 8.7–10.5)
CHLORIDE SERPL-SCNC: 103 MMOL/L (ref 95–110)
CO2 SERPL-SCNC: 23 MMOL/L (ref 23–29)
CREAT SERPL-MCNC: 0.8 MG/DL (ref 0.5–1.4)
DIFFERENTIAL METHOD BLD: ABNORMAL
EOSINOPHIL # BLD AUTO: 0.1 K/UL (ref 0–0.5)
EOSINOPHIL NFR BLD: 0.9 % (ref 0–8)
ERYTHROCYTE [DISTWIDTH] IN BLOOD BY AUTOMATED COUNT: 17.4 % (ref 11.5–14.5)
EST. GFR  (NO RACE VARIABLE): >60 ML/MIN/1.73 M^2
GLUCOSE SERPL-MCNC: 97 MG/DL (ref 70–110)
HCT VFR BLD AUTO: 22.4 % (ref 40–54)
HGB BLD-MCNC: 7.1 G/DL (ref 14–18)
IMM GRANULOCYTES # BLD AUTO: 0.12 K/UL (ref 0–0.04)
IMM GRANULOCYTES NFR BLD AUTO: 1.3 % (ref 0–0.5)
LYMPHOCYTES # BLD AUTO: 0.8 K/UL (ref 1–4.8)
LYMPHOCYTES NFR BLD: 8.8 % (ref 18–48)
MAGNESIUM SERPL-MCNC: 1.6 MG/DL (ref 1.6–2.6)
MCH RBC QN AUTO: 27 PG (ref 27–31)
MCHC RBC AUTO-ENTMCNC: 31.7 G/DL (ref 32–36)
MCV RBC AUTO: 85 FL (ref 82–98)
MONOCYTES # BLD AUTO: 1.1 K/UL (ref 0.3–1)
MONOCYTES NFR BLD: 11.9 % (ref 4–15)
NEUTROPHILS # BLD AUTO: 7.1 K/UL (ref 1.8–7.7)
NEUTROPHILS NFR BLD: 77 % (ref 38–73)
NRBC BLD-RTO: 0 /100 WBC
PHOSPHATE SERPL-MCNC: 3 MG/DL (ref 2.7–4.5)
PLATELET # BLD AUTO: 554 K/UL (ref 150–450)
PMV BLD AUTO: 10.2 FL (ref 9.2–12.9)
POCT GLUCOSE: 111 MG/DL (ref 70–110)
POCT GLUCOSE: 115 MG/DL (ref 70–110)
POCT GLUCOSE: 121 MG/DL (ref 70–110)
POCT GLUCOSE: 92 MG/DL (ref 70–110)
POCT GLUCOSE: 92 MG/DL (ref 70–110)
POTASSIUM SERPL-SCNC: 3.6 MMOL/L (ref 3.5–5.1)
PROT SERPL-MCNC: 6.6 G/DL (ref 6–8.4)
RBC # BLD AUTO: 2.63 M/UL (ref 4.6–6.2)
SODIUM SERPL-SCNC: 132 MMOL/L (ref 136–145)
WBC # BLD AUTO: 9.16 K/UL (ref 3.9–12.7)

## 2024-03-17 PROCEDURE — 63600175 PHARM REV CODE 636 W HCPCS: Performed by: STUDENT IN AN ORGANIZED HEALTH CARE EDUCATION/TRAINING PROGRAM

## 2024-03-17 PROCEDURE — 84100 ASSAY OF PHOSPHORUS: CPT

## 2024-03-17 PROCEDURE — 20600001 HC STEP DOWN PRIVATE ROOM

## 2024-03-17 PROCEDURE — 25000003 PHARM REV CODE 250

## 2024-03-17 PROCEDURE — 63600175 PHARM REV CODE 636 W HCPCS: Performed by: FAMILY MEDICINE

## 2024-03-17 PROCEDURE — 85025 COMPLETE CBC W/AUTO DIFF WBC: CPT

## 2024-03-17 PROCEDURE — 80053 COMPREHEN METABOLIC PANEL: CPT

## 2024-03-17 PROCEDURE — 36415 COLL VENOUS BLD VENIPUNCTURE: CPT

## 2024-03-17 PROCEDURE — 25000003 PHARM REV CODE 250: Performed by: STUDENT IN AN ORGANIZED HEALTH CARE EDUCATION/TRAINING PROGRAM

## 2024-03-17 PROCEDURE — 63600175 PHARM REV CODE 636 W HCPCS

## 2024-03-17 PROCEDURE — 83735 ASSAY OF MAGNESIUM: CPT

## 2024-03-17 PROCEDURE — 27000207 HC ISOLATION

## 2024-03-17 PROCEDURE — 27201109 HC SYSTEM FECAL MANAGEMENT

## 2024-03-17 RX ORDER — ONDANSETRON HYDROCHLORIDE 2 MG/ML
4 INJECTION, SOLUTION INTRAVENOUS EVERY 6 HOURS PRN
Status: DISCONTINUED | OUTPATIENT
Start: 2024-03-17 | End: 2024-03-27 | Stop reason: HOSPADM

## 2024-03-17 RX ADMIN — SODIUM CHLORIDE, POTASSIUM CHLORIDE, SODIUM LACTATE AND CALCIUM CHLORIDE 500 ML: 600; 310; 30; 20 INJECTION, SOLUTION INTRAVENOUS at 09:03

## 2024-03-17 RX ADMIN — CHLORHEXIDINE GLUCONATE 0.12% ORAL RINSE 15 ML: 1.2 LIQUID ORAL at 10:03

## 2024-03-17 RX ADMIN — MICONAZOLE NITRATE 2 % TOPICAL POWDER: at 09:03

## 2024-03-17 RX ADMIN — CHLORHEXIDINE GLUCONATE 0.12% ORAL RINSE 15 ML: 1.2 LIQUID ORAL at 09:03

## 2024-03-17 RX ADMIN — VANCOMYCIN HYDROCHLORIDE 125 MG: KIT at 06:03

## 2024-03-17 RX ADMIN — MICONAZOLE NITRATE 2 % TOPICAL POWDER: at 10:03

## 2024-03-17 RX ADMIN — VANCOMYCIN HYDROCHLORIDE 125 MG: KIT at 02:03

## 2024-03-17 RX ADMIN — ONDANSETRON 4 MG: 2 INJECTION INTRAMUSCULAR; INTRAVENOUS at 01:03

## 2024-03-17 RX ADMIN — VANCOMYCIN HYDROCHLORIDE 125 MG: KIT at 01:03

## 2024-03-17 RX ADMIN — ENOXAPARIN SODIUM 40 MG: 40 INJECTION SUBCUTANEOUS at 06:03

## 2024-03-17 RX ADMIN — SILODOSIN 4 MG: 4 CAPSULE ORAL at 10:03

## 2024-03-17 RX ADMIN — ATORVASTATIN CALCIUM 40 MG: 40 TABLET, FILM COATED ORAL at 10:03

## 2024-03-17 NOTE — PROGRESS NOTES
Patient noted with 2cc residual.  Notified Dr. Sousa and received instruction to increase tube feeds by 10cc for goal of 40cc/hr.  Increased to 20cc/hr.  Will continue to monitor patient.

## 2024-03-17 NOTE — PLAN OF CARE
Patient remaining free from injury.  Patient turned Q2H.  Noted area of excoriation same as yesterday and triad applied.  PAtient tolerating tube feeds at 40cc/hr and water boluses Q4H.  Residual noted 20cc.  Patient BG < 120.  Patient does not require coverage.  Noted 400cc liquid stool in bag.  Minimal leakage around FMS site.  Bryant catheter intact and bryant care performed x 3.  Heels elevated in boots and patient on air bed.  Patient head elevated > 60%.  Patient given partial bath and gown and linen change.  No new signs of infection.  Patient receiving Vanc for cdiff and precautions.  Family members educated on precautions and to wash hands with soap and water.  Awaiting swallow evaluation Monday.  Patient noted to have improved passive range of motion this pm.

## 2024-03-17 NOTE — ASSESSMENT & PLAN NOTE
SLP on board, recommending strict NPO > awaiting re-evaluation  - Continue tube feeds for now via NG tube, nutrition on board

## 2024-03-17 NOTE — SUBJECTIVE & OBJECTIVE
Interval History: Emesis overnight, tube feeds stopped. Diarrhea improving. Oriented to person, place, and time. Denies chest pain or difficulty breathing. Denies nausea.     Review of Systems  Objective:     Vital Signs (Most Recent):  Temp: 98.8 °F (37.1 °C) (03/17/24 0726)  Pulse: 91 (03/17/24 0726)  Resp: 18 (03/17/24 0726)  BP: (!) 102/53 (03/17/24 0726)  SpO2: 99 % (03/17/24 0726) Vital Signs (24h Range):  Temp:  [98.7 °F (37.1 °C)-99.2 °F (37.3 °C)] 98.8 °F (37.1 °C)  Pulse:  [77-99] 91  Resp:  [18] 18  SpO2:  [95 %-100 %] 99 %  BP: (102-135)/(53-70) 102/53     Weight: 110.2 kg (243 lb)  Body mass index is 35.88 kg/m².    Intake/Output Summary (Last 24 hours) at 3/17/2024 1343  Last data filed at 3/17/2024 0651  Gross per 24 hour   Intake 1060 ml   Output 1025 ml   Net 35 ml         Physical Exam  Vitals and nursing note reviewed.   Constitutional:       General: He is not in acute distress.     Appearance: He is not ill-appearing.   Eyes:      Extraocular Movements: Extraocular movements intact.   Cardiovascular:      Rate and Rhythm: Normal rate and regular rhythm.      Heart sounds: Normal heart sounds. No murmur heard.  Pulmonary:      Effort: No respiratory distress.      Breath sounds: Normal breath sounds. No wheezing.   Abdominal:      General: There is no distension.      Palpations: Abdomen is soft.      Tenderness: There is no abdominal tenderness.   Musculoskeletal:         General: No tenderness.      Right lower leg: No edema.      Left lower leg: No edema.      Comments: BUE contractures   Skin:     General: Skin is warm and dry.   Neurological:      Mental Status: He is alert and oriented to person, place, and time.      Comments: Not situation   Psychiatric:         Mood and Affect: Mood normal.         Behavior: Behavior normal.             Significant Labs: All pertinent labs within the past 24 hours have been reviewed.    Significant Imaging: I have reviewed all pertinent imaging  results/findings within the past 24 hours.

## 2024-03-17 NOTE — PLAN OF CARE
Disoriented at baseline; oriented to self and at times time/location. Disoriented to situation.      Pt on special contact precautions for cdiff, on Vancomycin. NPO. NG tube to R nare--continuous tube feeds on hold d/t high residual and episode of emesis overnight, see previous note.     Pt significantly impaired with contracted upper extremities. Turning/repositioning/weight shifting completed throughout shift. Rivera and FMS in place. Neuro checks q4. Residual checks q4. Accuchecks q6, BG stable.  Denies pain. Wound care provided to back, groin, and sacrum.    Specialty bed in use. Fall precautions maintained. Aspiration precautions maintained, HOB elevated 45+ degrees. Suctioning at bed side. Continuous pulse ox and telemetry monitoring in place.     No concerns voiced at this time.

## 2024-03-17 NOTE — PROGRESS NOTES
[facility] Medicine  Progress Note    Patient Name: Bj Pate Jr.  MRN: [id number]  Patient Class: IP- Inpatient   Admission Date: 3/4/2024  Length of Stay: 13 days  Attending Physician: Anum Sousa MD  Primary Care Provider: Jose Antonio Foster MD        Subjective:     Principal Problem:Acute encephalopathy        HPI:  73M with PMH CVA with R sided deficits, DM, HTN who presents to the ED from SNF for AMS. Per chart, he was more somnolent than usual over the last 2 days. At baseline he is verbal and follows commands but does not ambulate. On arrival to ED he was hypothermic to 87.9F and found to be hypoglycemic to the 50s. Intermittently hypotensive to MAP 55. Patient is awake but does not interact meaningfully on exam. Chronic Rivera was initially draining cloudy urine with sediment, Rivera exchanged. Initial labs without leukocytosis, CMP unremarkable, VBG with pH 7.49/30.3/38.9, lactate normal. Received 1L NS bolus through warming pump. Initially admitted to , critical care consulted for severe hypothermia with hypotension.    Overview/Hospital Course:  Admitted to the ED from SNF with acute encephalopathy. Initially admitted to hospital medicine however critical care consulted for severe hypothermia and hypotension.  Intubated 3/4 for airway protection, eventually extubated 3/10.  Workup notable for Citrobacter farmeri UTI, antibiotics tailored accordingly.  Neurology has been on board due to ongoing encephalopathy.  Recommending continued treatment of active medical issues.  Admission further complicated by heart block, likely Wenckebach for which EP was consulted. Likely indicative of periods of increased vagal tone or disordered sleep breathing, no evidnece of high degree block recorded, no indication for PPM. In addition, has been relatively intolerant of p.o. intake, NG tube has been placed with tube feeds started.  Failed MBSS. May possibly consider PEG if not  improved.    Neurology following, deferred LP for repeat spinal imaging. Quality limited by motion artifact. Patient's mental status improving slowly, approaching baseline. Awaiting further SLP evaluation for clearance to remove NG tube.    Diarrhea reported by nursing 3/15, c diff positive, started on oral vanc.     Patient with unclear history of urinary retention and chronic bryant. Will do voiding trial.     Interval History: Emesis overnight, tube feeds stopped. Diarrhea improving. Oriented to person, place, and time. Denies chest pain or difficulty breathing. Denies nausea.     Review of Systems  Objective:     Vital Signs (Most Recent):  Temp: 98.8 °F (37.1 °C) (03/17/24 0726)  Pulse: 91 (03/17/24 0726)  Resp: 18 (03/17/24 0726)  BP: (!) 102/53 (03/17/24 0726)  SpO2: 99 % (03/17/24 0726) Vital Signs (24h Range):  Temp:  [98.7 °F (37.1 °C)-99.2 °F (37.3 °C)] 98.8 °F (37.1 °C)  Pulse:  [77-99] 91  Resp:  [18] 18  SpO2:  [95 %-100 %] 99 %  BP: (102-135)/(53-70) 102/53     Weight: 110.2 kg (243 lb)  Body mass index is 35.88 kg/m².    Intake/Output Summary (Last 24 hours) at 3/17/2024 1343  Last data filed at 3/17/2024 0651  Gross per 24 hour   Intake 1060 ml   Output 1025 ml   Net 35 ml         Physical Exam  Vitals and nursing note reviewed.   Constitutional:       General: He is not in acute distress.     Appearance: He is not ill-appearing.   Eyes:      Extraocular Movements: Extraocular movements intact.   Cardiovascular:      Rate and Rhythm: Normal rate and regular rhythm.      Heart sounds: Normal heart sounds. No murmur heard.  Pulmonary:      Effort: No respiratory distress.      Breath sounds: Normal breath sounds. No wheezing.   Abdominal:      General: There is no distension.      Palpations: Abdomen is soft.      Tenderness: There is no abdominal tenderness.   Musculoskeletal:         General: No tenderness.      Right lower leg: No edema.      Left lower leg: No edema.      Comments: BUE contractures    Skin:     General: Skin is warm and dry.   Neurological:      Mental Status: He is alert and oriented to person, place, and time.      Comments: Not situation   Psychiatric:         Mood and Affect: Mood normal.         Behavior: Behavior normal.             Significant Labs: All pertinent labs within the past 24 hours have been reviewed.    Significant Imaging: I have reviewed all pertinent imaging results/findings within the past 24 hours.    Assessment/Plan:      * Acute encephalopathy  Improving   Presented from NH with altered mentation, initially intubated for airway protection 3/4, extubated 3/10. Likely acute on chronic in the setting of history of CVA with likely low cognitive reserve at baseline, and exacerbated by UTI. Varying accounts of baseline makes evaluation difficult. Per daughter: Has not been functionally independent since November (before December admission), currently lives in assisted living and requires assstance with ADLs. Mobility significantly impaird. AOx3 4-5 days out of the week, at other times will be confused and disoriented. 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 CTH on admission unremarkable for acute process. MRI spine obtained on 3/10 showing chronic dicitis  - Status post treatment for UTI  - Neurology on board, expect extended return to baseline given significant infection in the setting of fairly low cognitive reserve, continue treatment of active medical issues  -Contacted neuro 3/13 to discuss LP/further workup since patient not near baseline   Per radiology, patient's anatomy should not limit bedside LP attempt.    Neurology to attempt bedside LP 3/15 > deferred for repeat imaging   Repeat imaging limited by motion artifact, neurology deferring further intervention as mental status is improved 3/16  - NG tube placed > awaiting re-evaluation by SLP with improved mental status        Dysphagia  SLP on board,  "recommending strict NPO > awaiting re-evaluation  - Continue tube feeds for now via NG tube, nutrition on board      Sepsis  Resolved  This patient does have evidence of infective focus  My overall impression is sepsis.  Source: Urinary Tract  Antibiotics given-   Antibiotics (72h ago, onward)      Start     Stop Route Frequency Ordered    03/16/24 0800  vancomycin 125 mg/5 mL oral solution 125 mg  (C. difficile Infection (CDI) Treatment Order Panel)         03/26/24 1159 PER NG TUBE Every 6 hours 03/16/24 0747          Latest lactate reviewed-  No results for input(s): "LACTATE", "POCLAC" in the last 72 hours.  Organ dysfunction indicated by Encephalopathy    Fluid challenge Not needed - patient is not hypotensive      Post- resuscitation assessment No - Post resuscitation assessment not needed       Will Not start Pressors- Levophed for MAP of 65  Source control achieved by: antibiotics    Urinary tract infection without hematuria  now s/p 7 days zosyn  > unclear if bryant is for retention, will do voiding trial      Diarrhea  Significant diarrhea reported by nursing AM 3/15. No bowel regimen for over 48 hours and recent abx use. Concern for c diff vs tube feeds > c diff positive  - rectal tube in place  - oral vanc started 3/16       Clostridium difficile infection  As above      Second degree AV block, Mobitz type I  -Concern for CHB overnight 3/6  -EP: Likely indicative of periods of increased vagal tone or disordered sleep breathing, no evidence of high-degree block noted therefore no indication for PPM/transcutaneous pacing        Hypoglycemia        Hypothermia  Resolved      Hemiparesis        Anemia  Patient's anemia is currently controlled. Has not received any PRBCs to date. Etiology likely d/t  chronic disease vs KIMMY  Current CBC reviewed-   Lab Results   Component Value Date    HGB 7.1 (L) 03/17/2024    HCT 22.4 (L) 03/17/2024     Monitor serial CBC and transfuse if patient becomes hemodynamically " unstable, symptomatic or H/H drops below 7/21.    Hyponatremia  Patient has hyponatremia which is controlled,We will aim to correct the sodium by 4-6mEq in 24 hours. We will monitor sodium Daily. The hyponatremia is due to Dehydration/hypovolemia. The patient's sodium results have been reviewed and are listed below.  Recent Labs   Lab 03/17/24  0748   *         History of CVA (cerebrovascular accident)  History of R sided hemiparesis from prior CVA        VTE Risk Mitigation (From admission, onward)           Ordered     enoxaparin injection 40 mg  Every 24 hours         03/12/24 1008     IP VTE HIGH RISK PATIENT  Once         03/04/24 0316     Place sequential compression device  Until discontinued         03/04/24 0316                    Discharge Planning   GLORIA: 3/19/2024     Code Status: Full Code   Is the patient medically ready for discharge?: No    Reason for patient still in hospital (select all that apply): Patient trending condition and Consult recommendations  Discharge Plan A: Skilled Nursing Facility ([facility])   Discharge Delays: None known at this time              Anum Sousa MD  Department of Hospital Medicine   [facility] - [facility]

## 2024-03-17 NOTE — ASSESSMENT & PLAN NOTE
Patient's anemia is currently controlled. Has not received any PRBCs to date. Etiology likely d/t  chronic disease vs KIMMY  Current CBC reviewed-   Lab Results   Component Value Date    HGB 7.1 (L) 03/17/2024    HCT 22.4 (L) 03/17/2024     Monitor serial CBC and transfuse if patient becomes hemodynamically unstable, symptomatic or H/H drops below 7/21.

## 2024-03-17 NOTE — ASSESSMENT & PLAN NOTE
Patient has hyponatremia which is controlled,We will aim to correct the sodium by 4-6mEq in 24 hours. We will monitor sodium Daily. The hyponatremia is due to Dehydration/hypovolemia. The patient's sodium results have been reviewed and are listed below.  Recent Labs   Lab 03/17/24  0748   *

## 2024-03-17 NOTE — NURSING
Pt projectile vomited. MD JENNIFER Jaime notified.  IV Zofran administered. Water boluses and tube feeds held for the remainder of the night per MD; note, tube feeds and water bolus held earlier d/t residual >200mL, at recheck residual was only 7mL. Shortly after, pt developed symptoms while supine during incontinence care; RN immediately elevated HOB and pt vomited while sitting up. No water boluses received this shift.     Pt remains on continuous pulse ox with HOB elevated, SaO2 at baseline (mid 90s to 100%).     PO Vancomycin held temporarily to allow onset of Zofran, per MD.

## 2024-03-17 NOTE — PLAN OF CARE
Patient remained free from injury.  Patient turned Q2H.  Patient with only 175cc urine output for 10 hours.  Patient reports not in pain at this time, but does at times have back pain.  Patient alert and orientated to people, sometimes time, but not place or situation.  Patient denies nausea today.  No residuals noted.  Patient received 600cc total water boluses and re-initiated trickle feeds at 10cc/hr at 3pm.  Patient bryant to gravity with concentrated urine with noted sediment.  Patient afebrile.  Catheter care performed.  FMS in place.  Anal area noted intact with no ulcerations.  Excoriation area noted to buttocks, healing and triad applied.  Patient to have bryant removed in the am and voiding trial.  Speech did not see patient today, but patient to be evaluated tomorrow.  Passive ROM performed with patient today and patient tolerated well. BG monitored and WNL.  Patient remains on specialty bed.  Patient to return to [facility] mid week pending swallow eval and voiding trial results.

## 2024-03-17 NOTE — ASSESSMENT & PLAN NOTE
"Resolved  This patient does have evidence of infective focus  My overall impression is sepsis.  Source: Urinary Tract  Antibiotics given-   Antibiotics (72h ago, onward)      Start     Stop Route Frequency Ordered    03/16/24 0800  vancomycin 125 mg/5 mL oral solution 125 mg  (C. difficile Infection (CDI) Treatment Order Panel)         03/26/24 1159 PER NG TUBE Every 6 hours 03/16/24 0747          Latest lactate reviewed-  No results for input(s): "LACTATE", "POCLAC" in the last 72 hours.  Organ dysfunction indicated by Encephalopathy    Fluid challenge Not needed - patient is not hypotensive      Post- resuscitation assessment No - Post resuscitation assessment not needed       Will Not start Pressors- Levophed for MAP of 65  Source control achieved by: antibiotics  "

## 2024-03-17 NOTE — ASSESSMENT & PLAN NOTE
Improving   Presented from NH with altered mentation, initially intubated for airway protection 3/4, extubated 3/10. Likely acute on chronic in the setting of history of CVA with likely low cognitive reserve at baseline, and exacerbated by UTI. Varying accounts of baseline makes evaluation difficult. Per daughter: Has not been functionally independent since November (before December admission), currently lives in assisted living and requires assstance with ADLs. Mobility significantly impaird. AOx3 4-5 days out of the week, at other times will be confused and disoriented. 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 CTH on admission unremarkable for acute process. MRI spine obtained on 3/10 showing chronic dicitis  - Status post treatment for UTI  - Neurology on board, expect extended return to baseline given significant infection in the setting of fairly low cognitive reserve, continue treatment of active medical issues  -Contacted neuro 3/13 to discuss LP/further workup since patient not near baseline   Per radiology, patient's anatomy should not limit bedside LP attempt.    Neurology to attempt bedside LP 3/15 > deferred for repeat imaging   Repeat imaging limited by motion artifact, neurology deferring further intervention as mental status is improved 3/16  - NG tube placed > awaiting re-evaluation by SLP with improved mental status

## 2024-03-18 PROBLEM — Z97.8 CHRONIC INDWELLING FOLEY CATHETER: Status: ACTIVE | Noted: 2024-03-18

## 2024-03-18 LAB
ABO + RH BLD: NORMAL
ALBUMIN SERPL BCP-MCNC: 1.5 G/DL (ref 3.5–5.2)
ALP SERPL-CCNC: 109 U/L (ref 55–135)
ALT SERPL W/O P-5'-P-CCNC: 23 U/L (ref 10–44)
ANION GAP SERPL CALC-SCNC: 6 MMOL/L (ref 8–16)
ANION GAP SERPL CALC-SCNC: 6 MMOL/L (ref 8–16)
AST SERPL-CCNC: 39 U/L (ref 10–40)
BASOPHILS # BLD AUTO: 0.02 K/UL (ref 0–0.2)
BASOPHILS NFR BLD: 0.3 % (ref 0–1.9)
BILIRUB SERPL-MCNC: 0.4 MG/DL (ref 0.1–1)
BLD GP AB SCN CELLS X3 SERPL QL: NORMAL
BUN SERPL-MCNC: 16 MG/DL (ref 8–23)
BUN SERPL-MCNC: 16 MG/DL (ref 8–23)
CALCIUM SERPL-MCNC: 8.3 MG/DL (ref 8.7–10.5)
CALCIUM SERPL-MCNC: 8.7 MG/DL (ref 8.7–10.5)
CHLORIDE SERPL-SCNC: 100 MMOL/L (ref 95–110)
CHLORIDE SERPL-SCNC: 99 MMOL/L (ref 95–110)
CO2 SERPL-SCNC: 23 MMOL/L (ref 23–29)
CO2 SERPL-SCNC: 26 MMOL/L (ref 23–29)
CREAT SERPL-MCNC: 0.7 MG/DL (ref 0.5–1.4)
CREAT SERPL-MCNC: 0.8 MG/DL (ref 0.5–1.4)
DIFFERENTIAL METHOD BLD: ABNORMAL
EOSINOPHIL # BLD AUTO: 0.1 K/UL (ref 0–0.5)
EOSINOPHIL NFR BLD: 1.9 % (ref 0–8)
ERYTHROCYTE [DISTWIDTH] IN BLOOD BY AUTOMATED COUNT: 17.3 % (ref 11.5–14.5)
EST. GFR  (NO RACE VARIABLE): >60 ML/MIN/1.73 M^2
EST. GFR  (NO RACE VARIABLE): >60 ML/MIN/1.73 M^2
GLUCOSE SERPL-MCNC: 108 MG/DL (ref 70–110)
GLUCOSE SERPL-MCNC: 93 MG/DL (ref 70–110)
HCT VFR BLD AUTO: 22 % (ref 40–54)
HGB BLD-MCNC: 7 G/DL (ref 14–18)
IMM GRANULOCYTES # BLD AUTO: 0.13 K/UL (ref 0–0.04)
IMM GRANULOCYTES NFR BLD AUTO: 1.8 % (ref 0–0.5)
LYMPHOCYTES # BLD AUTO: 0.8 K/UL (ref 1–4.8)
LYMPHOCYTES NFR BLD: 11.4 % (ref 18–48)
MAGNESIUM SERPL-MCNC: 1.5 MG/DL (ref 1.6–2.6)
MCH RBC QN AUTO: 27 PG (ref 27–31)
MCHC RBC AUTO-ENTMCNC: 31.8 G/DL (ref 32–36)
MCV RBC AUTO: 85 FL (ref 82–98)
MONOCYTES # BLD AUTO: 1.1 K/UL (ref 0.3–1)
MONOCYTES NFR BLD: 14.5 % (ref 4–15)
NEUTROPHILS # BLD AUTO: 5.1 K/UL (ref 1.8–7.7)
NEUTROPHILS NFR BLD: 70.1 % (ref 38–73)
NRBC BLD-RTO: 0 /100 WBC
PHOSPHATE SERPL-MCNC: 2.9 MG/DL (ref 2.7–4.5)
PLATELET # BLD AUTO: 568 K/UL (ref 150–450)
PMV BLD AUTO: 9.2 FL (ref 9.2–12.9)
POCT GLUCOSE: 115 MG/DL (ref 70–110)
POCT GLUCOSE: 128 MG/DL (ref 70–110)
POTASSIUM SERPL-SCNC: 3.9 MMOL/L (ref 3.5–5.1)
POTASSIUM SERPL-SCNC: 4.3 MMOL/L (ref 3.5–5.1)
PROT SERPL-MCNC: 6.1 G/DL (ref 6–8.4)
RBC # BLD AUTO: 2.59 M/UL (ref 4.6–6.2)
SODIUM SERPL-SCNC: 129 MMOL/L (ref 136–145)
SODIUM SERPL-SCNC: 131 MMOL/L (ref 136–145)
SPECIMEN OUTDATE: NORMAL
WBC # BLD AUTO: 7.31 K/UL (ref 3.9–12.7)

## 2024-03-18 PROCEDURE — 27000207 HC ISOLATION

## 2024-03-18 PROCEDURE — 36415 COLL VENOUS BLD VENIPUNCTURE: CPT | Performed by: STUDENT IN AN ORGANIZED HEALTH CARE EDUCATION/TRAINING PROGRAM

## 2024-03-18 PROCEDURE — 25000003 PHARM REV CODE 250

## 2024-03-18 PROCEDURE — 92526 ORAL FUNCTION THERAPY: CPT

## 2024-03-18 PROCEDURE — 83735 ASSAY OF MAGNESIUM: CPT

## 2024-03-18 PROCEDURE — 25000003 PHARM REV CODE 250: Performed by: STUDENT IN AN ORGANIZED HEALTH CARE EDUCATION/TRAINING PROGRAM

## 2024-03-18 PROCEDURE — 63600175 PHARM REV CODE 636 W HCPCS: Performed by: STUDENT IN AN ORGANIZED HEALTH CARE EDUCATION/TRAINING PROGRAM

## 2024-03-18 PROCEDURE — 63600175 PHARM REV CODE 636 W HCPCS

## 2024-03-18 PROCEDURE — 80048 BASIC METABOLIC PNL TOTAL CA: CPT | Mod: XB | Performed by: STUDENT IN AN ORGANIZED HEALTH CARE EDUCATION/TRAINING PROGRAM

## 2024-03-18 PROCEDURE — 97535 SELF CARE MNGMENT TRAINING: CPT

## 2024-03-18 PROCEDURE — 80053 COMPREHEN METABOLIC PANEL: CPT

## 2024-03-18 PROCEDURE — 84100 ASSAY OF PHOSPHORUS: CPT

## 2024-03-18 PROCEDURE — 20600001 HC STEP DOWN PRIVATE ROOM

## 2024-03-18 PROCEDURE — 85025 COMPLETE CBC W/AUTO DIFF WBC: CPT

## 2024-03-18 PROCEDURE — 86850 RBC ANTIBODY SCREEN: CPT | Performed by: HOSPITALIST

## 2024-03-18 RX ORDER — MAGNESIUM SULFATE HEPTAHYDRATE 40 MG/ML
2 INJECTION, SOLUTION INTRAVENOUS ONCE
Status: COMPLETED | OUTPATIENT
Start: 2024-03-18 | End: 2024-03-18

## 2024-03-18 RX ADMIN — VANCOMYCIN HYDROCHLORIDE 125 MG: KIT at 12:03

## 2024-03-18 RX ADMIN — MAGNESIUM SULFATE HEPTAHYDRATE 2 G: 40 INJECTION, SOLUTION INTRAVENOUS at 08:03

## 2024-03-18 RX ADMIN — ENOXAPARIN SODIUM 40 MG: 40 INJECTION SUBCUTANEOUS at 04:03

## 2024-03-18 RX ADMIN — ATORVASTATIN CALCIUM 40 MG: 40 TABLET, FILM COATED ORAL at 08:03

## 2024-03-18 RX ADMIN — SILODOSIN 4 MG: 4 CAPSULE ORAL at 08:03

## 2024-03-18 RX ADMIN — VANCOMYCIN HYDROCHLORIDE 125 MG: KIT at 06:03

## 2024-03-18 RX ADMIN — CHLORHEXIDINE GLUCONATE 0.12% ORAL RINSE 15 ML: 1.2 LIQUID ORAL at 08:03

## 2024-03-18 RX ADMIN — VANCOMYCIN HYDROCHLORIDE 125 MG: KIT at 11:03

## 2024-03-18 RX ADMIN — CHLORHEXIDINE GLUCONATE 0.12% ORAL RINSE 15 ML: 1.2 LIQUID ORAL at 09:03

## 2024-03-18 RX ADMIN — MICONAZOLE NITRATE 2 % TOPICAL POWDER: at 09:03

## 2024-03-18 RX ADMIN — MICONAZOLE NITRATE 2 % TOPICAL POWDER: at 08:03

## 2024-03-18 NOTE — PLAN OF CARE
Disoriented at baseline; oriented to self. Pleasant and cooperative.     Pt on special contact precautions for cdiff, on Vancomycin. NPO; given ice chips for pleasure. NG tube to R nare--continuous tube feeds increased to goal rate, 40 mL/hr.  Free from nausea/vomiting this shift.     Pt significantly impaired with contracted upper extremities. Turning/repositioning/weight shifting completed throughout shift.     Rivera--plan to remove at 0600 for voiding trial.     FMS--stool improving in consistency--soft now instead of liquid.    Neuro checks q4. Residual checks and water boluses q4. Accuchecks q6, BG stable.  Denies pain. Wound care provided to back, groin, and sacrum.     Specialty bed in use. Fall precautions maintained. Aspiration precautions maintained, HOB elevated 45 degrees. Suctioning at bed side. Continuous pulse ox and telemetry monitoring in place.     No concerns voiced at this time.

## 2024-03-18 NOTE — PT/OT/SLP PROGRESS
Physical Therapy  Continue Current Plan of Care     Patient Name:  Bj Pate Jr.   MRN:  [id number]  Admitting Diagnosis:  Acute encephalopathy   Recent Surgery: * No surgery found *    Admit Date: 3/4/2024  Length of Stay: 14 days    Patient not seen on this date, however per chart review pt continues to benefit from acute PT services. PT to follow up as POC allows. Please continue progressive mobility as appropriate.    Will follow up: 3/19/24

## 2024-03-18 NOTE — PROGRESS NOTES
[facility] - [facility] Medicine  Progress Note    Patient Name: Bj Pate Jr.  MRN: [id number]  Patient Class: IP- Inpatient   Admission Date: 3/4/2024  Length of Stay: 14 days  Attending Physician: Anum Sousa MD  Primary Care Provider: Jose Antonio Foster MD        Subjective:     Principal Problem:Acute encephalopathy        HPI:  73M with PMH CVA with R sided deficits, DM, HTN who presents to the ED from SNF for AMS. Per chart, he was more somnolent than usual over the last 2 days. At baseline he is verbal and follows commands but does not ambulate. On arrival to ED he was hypothermic to 87.9F and found to be hypoglycemic to the 50s. Intermittently hypotensive to MAP 55. Patient is awake but does not interact meaningfully on exam. Chronic Rivera was initially draining cloudy urine with sediment, Rivera exchanged. Initial labs without leukocytosis, CMP unremarkable, VBG with pH 7.49/30.3/38.9, lactate normal. Received 1L NS bolus through warming pump. Initially admitted to , critical care consulted for severe hypothermia with hypotension.    Overview/Hospital Course:  Admitted to the ED from SNF with acute encephalopathy. Initially admitted to hospital medicine however critical care consulted for severe hypothermia and hypotension.  Intubated 3/4 for airway protection, eventually extubated 3/10.  Workup notable for Citrobacter farmeri UTI, antibiotics tailored accordingly.  Neurology has been on board due to ongoing encephalopathy.  Recommending continued treatment of active medical issues.  Admission further complicated by heart block, likely Wenckebach for which EP was consulted. Likely indicative of periods of increased vagal tone or disordered sleep breathing, no evidnece of high degree block recorded, no indication for PPM. In addition, has been relatively intolerant of p.o. intake, NG tube has been placed with tube feeds started.  Failed MBSS. May possibly consider PEG if not  improved.    Neurology following, deferred LP for repeat spinal imaging. Quality limited by motion artifact. Patient's mental status now near baseline. Cleared for PO by SLP on 3/18. NG tube to be removed.    Diarrhea reported by nursing 3/15, c diff positive, started on oral vanc.     Patient with unclear history of urinary retention and chronic bryant. Bryant catheter removed AM 3/18, will replace as needed    Interval History: Near mental baseline. Starting diet. Diarrhea resolving. Patient denies chest pain, difficulty breathing, nausea or vomiting.     Review of Systems  Objective:     Vital Signs (Most Recent):  Temp: 98.9 °F (37.2 °C) (03/18/24 1135)  Pulse: 94 (03/18/24 1144)  Resp: 14 (03/18/24 1135)  BP: 106/65 (03/18/24 1135)  SpO2: 95 % (03/18/24 0807) Vital Signs (24h Range):  Temp:  [98.1 °F (36.7 °C)-99.2 °F (37.3 °C)] 98.9 °F (37.2 °C)  Pulse:  [87-99] 94  Resp:  [14-18] 14  SpO2:  [95 %-99 %] 95 %  BP: (100-110)/(50-68) 106/65     Weight: 116.1 kg (256 lb)  Body mass index is 37.8 kg/m².    Intake/Output Summary (Last 24 hours) at 3/18/2024 1332  Last data filed at 3/18/2024 1110  Gross per 24 hour   Intake 1475 ml   Output 550 ml   Net 925 ml         Physical Exam  Vitals and nursing note reviewed.   Constitutional:       General: He is not in acute distress.     Appearance: He is not ill-appearing.   Eyes:      Extraocular Movements: Extraocular movements intact.   Cardiovascular:      Rate and Rhythm: Normal rate and regular rhythm.      Heart sounds: Normal heart sounds. No murmur heard.  Pulmonary:      Effort: No respiratory distress.      Breath sounds: Normal breath sounds. No wheezing.   Abdominal:      General: There is no distension.      Palpations: Abdomen is soft.      Tenderness: There is no abdominal tenderness.   Musculoskeletal:         General: No tenderness.      Right lower leg: No edema.      Left lower leg: No edema.      Comments: BUE contractures   Skin:     General: Skin is  warm and dry.   Neurological:      Mental Status: He is alert and oriented to person, place, and time.      Comments: Not situation   Psychiatric:         Mood and Affect: Mood normal.         Behavior: Behavior normal.             Significant Labs: All pertinent labs within the past 24 hours have been reviewed.    Significant Imaging: I have reviewed all pertinent imaging results/findings within the past 24 hours.    Assessment/Plan:      * Acute encephalopathy  Resolved  Presented from NH with altered mentation, initially intubated for airway protection 3/4, extubated 3/10. Likely acute on chronic in the setting of history of CVA with likely low cognitive reserve at baseline, and exacerbated by UTI. Varying accounts of baseline makes evaluation difficult. Per daughter: Has not been functionally independent since November (before December admission), currently lives in assisted living and requires assstance with ADLs. Mobility significantly impaird. AOx3 4-5 days out of the week, at other times will be confused and disoriented. 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 CTH on admission unremarkable for acute process. MRI spine obtained on 3/10 showing chronic dicitis  - Status post treatment for UTI  - Neurology on board, expect extended return to baseline given significant infection in the setting of fairly low cognitive reserve, continue treatment of active medical issues  -Contacted neuro 3/13 to discuss LP/further workup since patient not near baseline   Per radiology, patient's anatomy should not limit bedside LP attempt.    Neurology to attempt bedside LP 3/15 > deferred for repeat imaging   Repeat imaging limited by motion artifact, neurology deferring further intervention as mental status is improved 3/16  - NG tube to be removed        Dysphagia  SLP on board, recommended strict NPO > cleared for PO 3/18  - NG to be removed  "      Sepsis  Resolved  This patient does have evidence of infective focus  My overall impression is sepsis.  Source: Urinary Tract  Antibiotics given-   Antibiotics (72h ago, onward)      Start     Stop Route Frequency Ordered    03/16/24 0800  vancomycin 125 mg/5 mL oral solution 125 mg  (C. difficile Infection (CDI) Treatment Order Panel)         03/26/24 1159 PER NG TUBE Every 6 hours 03/16/24 0747          Latest lactate reviewed-  No results for input(s): "LACTATE", "POCLAC" in the last 72 hours.  Organ dysfunction indicated by Encephalopathy    Fluid challenge Not needed - patient is not hypotensive      Post- resuscitation assessment No - Post resuscitation assessment not needed       Will Not start Pressors- Levophed for MAP of 65  Source control achieved by: antibiotics    Urinary tract infection without hematuria  now s/p 7 days zosyn  > unclear if bryant is for retention, will do voiding trial      Diarrhea  Improving   Significant diarrhea reported by nursing AM 3/15. No bowel regimen for over 48 hours and recent abx use. Concern for c diff vs tube feeds > c diff positive  - oral vanc started 3/16       Clostridium difficile infection  As above      Chronic indwelling Bryant catheter  Patient admitted to hospital with bryant. Unclear if it was in place for retention or due to history of sacral wound. Patient has not been evaluated by urology.   - voiding trial 3/18  - outpatient follow-up with urology     Second degree AV block, Mobitz type I  -Concern for CHB overnight 3/6  -EP: Likely indicative of periods of increased vagal tone or disordered sleep breathing, no evidence of high-degree block noted therefore no indication for PPM/transcutaneous pacing        Hypoglycemia        Hypothermia  Resolved      Hemiparesis        Anemia  Patient's anemia is currently controlled. Has not received any PRBCs to date. Etiology likely d/t  chronic disease vs KIMMY  Current CBC reviewed-   Lab Results   Component Value " Date    HGB 7.0 (L) 03/18/2024    HCT 22.0 (L) 03/18/2024     Monitor serial CBC and transfuse if patient becomes hemodynamically unstable, symptomatic or H/H drops below 7/21.    Hyponatremia  Patient has hyponatremia which is uncontrolled,We will aim to correct the sodium by 4-6mEq in 24 hours. We will monitor sodium Daily. The hyponatremia is due to Dehydration/hypovolemia vs excessive free water flushes. The patient's sodium results have been reviewed and are listed below.  Recent Labs   Lab 03/18/24  0715   *         History of CVA (cerebrovascular accident)  History of R sided hemiparesis from prior CVA        VTE Risk Mitigation (From admission, onward)           Ordered     enoxaparin injection 40 mg  Every 24 hours         03/12/24 1008     IP VTE HIGH RISK PATIENT  Once         03/04/24 0316     Place sequential compression device  Until discontinued         03/04/24 0316                    Discharge Planning   GLORIA: 3/20/2024     Code Status: Full Code   Is the patient medically ready for discharge?: No    Reason for patient still in hospital (select all that apply): Patient trending condition and Laboratory test  Discharge Plan A: Return to nursing home   Discharge Delays: None known at this time              Anum Sousa MD  Department of Hospital Medicine   [facility] - [facility]

## 2024-03-18 NOTE — PT/OT/SLP PROGRESS
Speech Language Pathology Treatment    Patient Name:  Bj Pate Jr.   MRN:  [id number]  [id number]    Admitting Diagnosis: Acute encephalopathy    Recommendations:                 General Recommendations:  Dysphagia therapy  Diet recommendations:  Minced & Moist Diet - IDDSI Level 5, Liquid Diet Level: Thin liquids - IDDSI Level 0   Aspiration Precautions:   1 small bite/sip at a time,   Alternating bites/sips,   Assistance with meals,   Check for pocketing/oral residue,   Eliminate distractions and feed only when awake/alert,   HOB to 90 degrees,   Meds crushed in puree,   Monitor for s/s of aspiration,   and Strict aspiration precautions   General Precautions: Standard, fall, aspiration  Communication strategies:  none    Assessment:     Bj Pate Jr. is a 74 y.o. male with an SLP diagnosis of Dysphagia.  He presents with improving JD from prior ST sessions. Recommend initiating minced and moist diet and thin liquids with close monitoring. Patient may benefit from holding on NGT removal until patient presents with consistent tolerance of diet and adequate intake 2/2 h/o poor po intake when alert and waxing/waning JD.     Subjective     Orders received to evaluate swallowing. ST already following.   Patient awake and cooperative.   SLP communicated with RN prior to entry. RN cleared SLP to administer po trials.     Objective:     Has the patient been evaluated by SLP for swallowing?   Yes  Keep patient NPO? No    Patient seen for an ongoing swallowing assessment. HOB elevated as high as bed mechanics allowed and reverse trendelenburg utilize to optimize positioning. SLP provided oral care with toothbrush, toothpaste, oral swabs, and suctioning. Partial upper dentures noted at bedside, cleaned, and placed in oral cavity. Patient reporting he also has lower dentures which are not in room. He accepted sips of water via cup, bites of apple sauce, and bites of bhaskar cracker. Patient with facial grimacing 2/2  disliking taste of apple sauce, however, agreeable to second trial. Noted gagging after second trial, and no further apple sauce trials given. Mild oral residue appreciated with bhaskar cracker trials which was adequately cleared with liquid wash. SLP provided patient education on SLP recommendations, SLP role, s/s and risks of aspiration, safe swallow precautions, and POC. Patient would benefit from ongoing reinforcement. RN notified of recommendations. White board updated.     Goals:   Multidisciplinary Problems       SLP Goals          Problem: SLP    Goal Priority Disciplines Outcome   SLP Goal     SLP    Description: Speech Language Pathology Goals  Goals expected to be met by 3/18:  1. Pt will participate in Modified Barium Swallow Study to r/o aspiration and determine safest PO diet.                                Plan:     Patient to be seen:  3 x/week   Plan of Care expires:  04/12/24  Plan of Care reviewed with:  patient   SLP Follow-Up:  Yes       Discharge recommendations:  Moderate Intensity Therapy   Barriers to Discharge:  None    Time Tracking:     SLP Treatment Date:   03/18/24  Speech Start Time:  0759  Speech Stop Time:  0824     Speech Total Time (min):  25 min    Billable Minutes: Treatment Swallowing Dysfunction 13 and Self Care/Home Management Training 12    03/18/2024

## 2024-03-18 NOTE — ASSESSMENT & PLAN NOTE
Patient has hyponatremia which is uncontrolled,We will aim to correct the sodium by 4-6mEq in 24 hours. We will monitor sodium Daily. The hyponatremia is due to Dehydration/hypovolemia vs excessive free water flushes. The patient's sodium results have been reviewed and are listed below.  Recent Labs   Lab 03/18/24  0715   *

## 2024-03-18 NOTE — ASSESSMENT & PLAN NOTE
Patient's anemia is currently controlled. Has not received any PRBCs to date. Etiology likely d/t  chronic disease vs KIMMY  Current CBC reviewed-   Lab Results   Component Value Date    HGB 7.0 (L) 03/18/2024    HCT 22.0 (L) 03/18/2024     Monitor serial CBC and transfuse if patient becomes hemodynamically unstable, symptomatic or H/H drops below 7/21.

## 2024-03-18 NOTE — ASSESSMENT & PLAN NOTE
Improving   Significant diarrhea reported by nursing AM 3/15. No bowel regimen for over 48 hours and recent abx use. Concern for c diff vs tube feeds > c diff positive  - oral vanc started 3/16

## 2024-03-18 NOTE — PLAN OF CARE
[facility] - Transplant Stepdown  Discharge Reassessment    Primary Care Provider: Jose Antonio Foster MD    Expected Discharge Date: 3/20/2024    Reassessment (most recent)       Discharge Reassessment - 03/18/24 0951          Discharge Reassessment    Assessment Type Discharge Planning Reassessment     Discharge Plan A Return to nursing home     Discharge Plan B Return to Nursing Home     DME Needed Upon Discharge  none     Transition of Care Barriers None     Why the patient remains in the hospital Requires continued medical care                     Discharge Plan A and Plan B have been determined by review of patient's clinical status, future medical and therapeutic needs, and coverage/benefits for post-acute care in coordination with multidisciplinary team members.      Lily Castillo LMSW  Case Management [facility]

## 2024-03-18 NOTE — SUBJECTIVE & OBJECTIVE
Interval History: Near mental baseline. Starting diet. Diarrhea resolving. Patient denies chest pain, difficulty breathing, nausea or vomiting.     Review of Systems  Objective:     Vital Signs (Most Recent):  Temp: 98.9 °F (37.2 °C) (03/18/24 1135)  Pulse: 94 (03/18/24 1144)  Resp: 14 (03/18/24 1135)  BP: 106/65 (03/18/24 1135)  SpO2: 95 % (03/18/24 0807) Vital Signs (24h Range):  Temp:  [98.1 °F (36.7 °C)-99.2 °F (37.3 °C)] 98.9 °F (37.2 °C)  Pulse:  [87-99] 94  Resp:  [14-18] 14  SpO2:  [95 %-99 %] 95 %  BP: (100-110)/(50-68) 106/65     Weight: 116.1 kg (256 lb)  Body mass index is 37.8 kg/m².    Intake/Output Summary (Last 24 hours) at 3/18/2024 1332  Last data filed at 3/18/2024 1110  Gross per 24 hour   Intake 1475 ml   Output 550 ml   Net 925 ml         Physical Exam  Vitals and nursing note reviewed.   Constitutional:       General: He is not in acute distress.     Appearance: He is not ill-appearing.   Eyes:      Extraocular Movements: Extraocular movements intact.   Cardiovascular:      Rate and Rhythm: Normal rate and regular rhythm.      Heart sounds: Normal heart sounds. No murmur heard.  Pulmonary:      Effort: No respiratory distress.      Breath sounds: Normal breath sounds. No wheezing.   Abdominal:      General: There is no distension.      Palpations: Abdomen is soft.      Tenderness: There is no abdominal tenderness.   Musculoskeletal:         General: No tenderness.      Right lower leg: No edema.      Left lower leg: No edema.      Comments: BUE contractures   Skin:     General: Skin is warm and dry.   Neurological:      Mental Status: He is alert and oriented to person, place, and time.      Comments: Not situation   Psychiatric:         Mood and Affect: Mood normal.         Behavior: Behavior normal.             Significant Labs: All pertinent labs within the past 24 hours have been reviewed.    Significant Imaging: I have reviewed all pertinent imaging results/findings within the past 24  hours.

## 2024-03-18 NOTE — ASSESSMENT & PLAN NOTE
Patient admitted to hospital with bryant. Unclear if it was in place for retention or due to history of sacral wound. Patient has not been evaluated by urology.   - voiding trial 3/18  - outpatient follow-up with urology

## 2024-03-18 NOTE — ASSESSMENT & PLAN NOTE
Resolved  Presented from NH with altered mentation, initially intubated for airway protection 3/4, extubated 3/10. Likely acute on chronic in the setting of history of CVA with likely low cognitive reserve at baseline, and exacerbated by UTI. Varying accounts of baseline makes evaluation difficult. Per daughter: Has not been functionally independent since November (before December admission), currently lives in assisted living and requires assstance with ADLs. Mobility significantly impaird. AOx3 4-5 days out of the week, at other times will be confused and disoriented. 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 CTH on admission unremarkable for acute process. MRI spine obtained on 3/10 showing chronic dicitis  - Status post treatment for UTI  - Neurology on board, expect extended return to baseline given significant infection in the setting of fairly low cognitive reserve, continue treatment of active medical issues  -Contacted neuro 3/13 to discuss LP/further workup since patient not near baseline   Per radiology, patient's anatomy should not limit bedside LP attempt.    Neurology to attempt bedside LP 3/15 > deferred for repeat imaging   Repeat imaging limited by motion artifact, neurology deferring further intervention as mental status is improved 3/16  - NG tube to be removed

## 2024-03-19 LAB
ALBUMIN SERPL BCP-MCNC: 1.4 G/DL (ref 3.5–5.2)
ALP SERPL-CCNC: 98 U/L (ref 55–135)
ALT SERPL W/O P-5'-P-CCNC: 23 U/L (ref 10–44)
ANION GAP SERPL CALC-SCNC: 6 MMOL/L (ref 8–16)
AST SERPL-CCNC: 36 U/L (ref 10–40)
BASOPHILS # BLD AUTO: 0.02 K/UL (ref 0–0.2)
BASOPHILS NFR BLD: 0.2 % (ref 0–1.9)
BILIRUB SERPL-MCNC: 0.5 MG/DL (ref 0.1–1)
BUN SERPL-MCNC: 15 MG/DL (ref 8–23)
CALCIUM SERPL-MCNC: 8.5 MG/DL (ref 8.7–10.5)
CHLORIDE SERPL-SCNC: 102 MMOL/L (ref 95–110)
CO2 SERPL-SCNC: 24 MMOL/L (ref 23–29)
CREAT SERPL-MCNC: 0.8 MG/DL (ref 0.5–1.4)
DIFFERENTIAL METHOD BLD: ABNORMAL
EOSINOPHIL # BLD AUTO: 0.1 K/UL (ref 0–0.5)
EOSINOPHIL NFR BLD: 1.3 % (ref 0–8)
ERYTHROCYTE [DISTWIDTH] IN BLOOD BY AUTOMATED COUNT: 17.1 % (ref 11.5–14.5)
EST. GFR  (NO RACE VARIABLE): >60 ML/MIN/1.73 M^2
FERRITIN SERPL-MCNC: 2708 NG/ML (ref 20–300)
GLUCOSE SERPL-MCNC: 91 MG/DL (ref 70–110)
HCT VFR BLD AUTO: 21.2 % (ref 40–54)
HGB BLD-MCNC: 6.8 G/DL (ref 14–18)
HGB BLD-MCNC: 7.1 G/DL (ref 14–18)
IMM GRANULOCYTES # BLD AUTO: 0.14 K/UL (ref 0–0.04)
IMM GRANULOCYTES NFR BLD AUTO: 1.6 % (ref 0–0.5)
IRON SERPL-MCNC: <10 UG/DL (ref 45–160)
LYMPHOCYTES # BLD AUTO: 0.9 K/UL (ref 1–4.8)
LYMPHOCYTES NFR BLD: 10.4 % (ref 18–48)
MAGNESIUM SERPL-MCNC: 1.7 MG/DL (ref 1.6–2.6)
MCH RBC QN AUTO: 27.1 PG (ref 27–31)
MCHC RBC AUTO-ENTMCNC: 32.1 G/DL (ref 32–36)
MCV RBC AUTO: 85 FL (ref 82–98)
MONOCYTES # BLD AUTO: 1.4 K/UL (ref 0.3–1)
MONOCYTES NFR BLD: 16.3 % (ref 4–15)
NEUTROPHILS # BLD AUTO: 6.1 K/UL (ref 1.8–7.7)
NEUTROPHILS NFR BLD: 70.2 % (ref 38–73)
NRBC BLD-RTO: 0 /100 WBC
PHOSPHATE SERPL-MCNC: 2.6 MG/DL (ref 2.7–4.5)
PLATELET # BLD AUTO: 600 K/UL (ref 150–450)
PMV BLD AUTO: 10 FL (ref 9.2–12.9)
POTASSIUM SERPL-SCNC: 3.9 MMOL/L (ref 3.5–5.1)
PROT SERPL-MCNC: 6.4 G/DL (ref 6–8.4)
RBC # BLD AUTO: 2.51 M/UL (ref 4.6–6.2)
SATURATED IRON: ABNORMAL % (ref 20–50)
SODIUM SERPL-SCNC: 132 MMOL/L (ref 136–145)
TOTAL IRON BINDING CAPACITY: 163 UG/DL (ref 250–450)
TRANSFERRIN SERPL-MCNC: 110 MG/DL (ref 200–375)
WBC # BLD AUTO: 8.69 K/UL (ref 3.9–12.7)

## 2024-03-19 PROCEDURE — 97110 THERAPEUTIC EXERCISES: CPT | Mod: CQ

## 2024-03-19 PROCEDURE — 97112 NEUROMUSCULAR REEDUCATION: CPT | Mod: CO

## 2024-03-19 PROCEDURE — 36415 COLL VENOUS BLD VENIPUNCTURE: CPT | Mod: XB

## 2024-03-19 PROCEDURE — 97530 THERAPEUTIC ACTIVITIES: CPT | Mod: CQ

## 2024-03-19 PROCEDURE — 25000003 PHARM REV CODE 250

## 2024-03-19 PROCEDURE — 80053 COMPREHEN METABOLIC PANEL: CPT

## 2024-03-19 PROCEDURE — 84100 ASSAY OF PHOSPHORUS: CPT

## 2024-03-19 PROCEDURE — 63600175 PHARM REV CODE 636 W HCPCS

## 2024-03-19 PROCEDURE — 99900035 HC TECH TIME PER 15 MIN (STAT)

## 2024-03-19 PROCEDURE — 83540 ASSAY OF IRON: CPT | Performed by: STUDENT IN AN ORGANIZED HEALTH CARE EDUCATION/TRAINING PROGRAM

## 2024-03-19 PROCEDURE — 85018 HEMOGLOBIN: CPT | Performed by: STUDENT IN AN ORGANIZED HEALTH CARE EDUCATION/TRAINING PROGRAM

## 2024-03-19 PROCEDURE — 82728 ASSAY OF FERRITIN: CPT | Performed by: STUDENT IN AN ORGANIZED HEALTH CARE EDUCATION/TRAINING PROGRAM

## 2024-03-19 PROCEDURE — 83735 ASSAY OF MAGNESIUM: CPT

## 2024-03-19 PROCEDURE — 36415 COLL VENOUS BLD VENIPUNCTURE: CPT | Performed by: STUDENT IN AN ORGANIZED HEALTH CARE EDUCATION/TRAINING PROGRAM

## 2024-03-19 PROCEDURE — 94668 MNPJ CHEST WALL SBSQ: CPT

## 2024-03-19 PROCEDURE — 20600001 HC STEP DOWN PRIVATE ROOM

## 2024-03-19 PROCEDURE — 85025 COMPLETE CBC W/AUTO DIFF WBC: CPT

## 2024-03-19 PROCEDURE — 25000003 PHARM REV CODE 250: Performed by: STUDENT IN AN ORGANIZED HEALTH CARE EDUCATION/TRAINING PROGRAM

## 2024-03-19 PROCEDURE — 94761 N-INVAS EAR/PLS OXIMETRY MLT: CPT

## 2024-03-19 PROCEDURE — 27000207 HC ISOLATION

## 2024-03-19 RX ADMIN — MICONAZOLE NITRATE 2 % TOPICAL POWDER: at 07:03

## 2024-03-19 RX ADMIN — CHLORHEXIDINE GLUCONATE 0.12% ORAL RINSE 15 ML: 1.2 LIQUID ORAL at 08:03

## 2024-03-19 RX ADMIN — VANCOMYCIN HYDROCHLORIDE 125 MG: KIT at 12:03

## 2024-03-19 RX ADMIN — VANCOMYCIN HYDROCHLORIDE 125 MG: KIT at 05:03

## 2024-03-19 RX ADMIN — VANCOMYCIN HYDROCHLORIDE 125 MG: KIT at 11:03

## 2024-03-19 RX ADMIN — MICONAZOLE NITRATE 2 % TOPICAL POWDER: at 08:03

## 2024-03-19 RX ADMIN — VANCOMYCIN HYDROCHLORIDE 125 MG: KIT at 06:03

## 2024-03-19 RX ADMIN — SILODOSIN 4 MG: 4 CAPSULE ORAL at 08:03

## 2024-03-19 RX ADMIN — ATORVASTATIN CALCIUM 40 MG: 40 TABLET, FILM COATED ORAL at 08:03

## 2024-03-19 RX ADMIN — ENOXAPARIN SODIUM 40 MG: 40 INJECTION SUBCUTANEOUS at 04:03

## 2024-03-19 NOTE — SUBJECTIVE & OBJECTIVE
Interval History: patient pleasant this morning. Tolerating diet. Diarrhea has resolved. Denies chest pain, difficulty breathing, n/d, c/d.     Review of Systems  Objective:     Vital Signs (Most Recent):  Temp: 98.4 °F (36.9 °C) (03/19/24 0729)  Pulse: 107 (03/19/24 1053)  Resp: 14 (03/19/24 0729)  BP: (!) 111/46 (03/19/24 0729)  SpO2: 96 % (03/19/24 1053) Vital Signs (24h Range):  Temp:  [97.3 °F (36.3 °C)-99.1 °F (37.3 °C)] 98.4 °F (36.9 °C)  Pulse:  [] 107  Resp:  [14-18] 14  SpO2:  [90 %-99 %] 96 %  BP: (105-125)/(46-65) 111/46     Weight: 114.3 kg (252 lb)  Body mass index is 37.21 kg/m².    Intake/Output Summary (Last 24 hours) at 3/19/2024 1136  Last data filed at 3/19/2024 0915  Gross per 24 hour   Intake 1307 ml   Output 425 ml   Net 882 ml         Physical Exam  Vitals and nursing note reviewed.   Constitutional:       General: He is not in acute distress.     Appearance: He is not ill-appearing.   Eyes:      Extraocular Movements: Extraocular movements intact.   Cardiovascular:      Rate and Rhythm: Normal rate and regular rhythm.      Heart sounds: Normal heart sounds. No murmur heard.  Pulmonary:      Effort: No respiratory distress.      Breath sounds: Normal breath sounds. No wheezing.   Abdominal:      General: There is no distension.      Palpations: Abdomen is soft.      Tenderness: There is no abdominal tenderness.   Musculoskeletal:         General: No tenderness.      Right lower leg: No edema.      Left lower leg: No edema.      Comments: BUE contractures   Skin:     General: Skin is warm and dry.   Neurological:      Mental Status: He is alert and oriented to person, place, and time. Mental status is at baseline.   Psychiatric:         Mood and Affect: Mood normal.         Behavior: Behavior normal.             Significant Labs: All pertinent labs within the past 24 hours have been reviewed.  BMP:   Recent Labs   Lab 03/19/24  0605   GLU 91   *   K 3.9      CO2 24   BUN 15    CREATININE 0.8   CALCIUM 8.5*   MG 1.7     CBC:   Recent Labs   Lab 03/18/24  0715 03/19/24  0605 03/19/24  1046   WBC 7.31 8.69  --    HGB 7.0* 6.8* 7.1*   HCT 22.0* 21.2*  --    * 600*  --        Significant Imaging: I have reviewed all pertinent imaging results/findings within the past 24 hours.

## 2024-03-19 NOTE — PROGRESS NOTES
Connor Mina - Transplant [facility] Medicine  Progress Note    Patient Name: Bj Pate Jr.  MRN: [id number]  Patient Class: IP- Inpatient   Admission Date: 3/4/2024  Length of Stay: 15 days  Attending Physician: Anum Sousa MD  Primary Care Provider: Jose Antonio Foster MD        Subjective:     Principal Problem:Acute encephalopathy        HPI:  73M with PMH CVA with R sided deficits, DM, HTN who presents to the ED from SNF for AMS. Per chart, he was more somnolent than usual over the last 2 days. At baseline he is verbal and follows commands but does not ambulate. On arrival to ED he was hypothermic to 87.9F and found to be hypoglycemic to the 50s. Intermittently hypotensive to MAP 55. Patient is awake but does not interact meaningfully on exam. Chronic Rivera was initially draining cloudy urine with sediment, Rivera exchanged. Initial labs without leukocytosis, CMP unremarkable, VBG with pH 7.49/30.3/38.9, lactate normal. Received 1L NS bolus through warming pump. Initially admitted to , critical care consulted for severe hypothermia with hypotension.    Overview/Hospital Course:  Admitted to the ED from SNF with acute encephalopathy. Initially admitted to hospital medicine however critical care consulted for severe hypothermia and hypotension.  Intubated 3/4 for airway protection, eventually extubated 3/10.  Workup notable for Citrobacter farmeri UTI, antibiotics tailored accordingly.  Neurology has been on board due to ongoing encephalopathy.  Recommending continued treatment of active medical issues.  Admission further complicated by heart block, likely Wenckebach for which EP was consulted. Likely indicative of periods of increased vagal tone or disordered sleep breathing, no evidnece of high degree block recorded, no indication for PPM. In addition, has been relatively intolerant of p.o. intake, NG tube has been placed with tube feeds started. Failed MBSS. Neurology following, deferred LP for  repeat spinal imaging. Quality limited by motion artifact. Patient's mental status near baseline by 3/15. Cleared for PO by SLP on 3/18. NG tube to be removed. Patient with unclear history of urinary retention and chronic bryant. Bryant catheter removed AM 3/18, passed voiding trial. Diarrhea reported by nursing 3/15, c diff positive, started on oral vanc. Last episode of diarrhea documented on 3/16. Per patient's SNF, patient must be off contact precautions before returning. Patient to remain inpatient until completion of treatment for c diff on 3/26 when contact precautions can be discontinued as per hospital protocol.     Interval History: patient pleasant this morning. Tolerating diet. Diarrhea has resolved. Denies chest pain, difficulty breathing, n/d, c/d.     Review of Systems  Objective:     Vital Signs (Most Recent):  Temp: 98.4 °F (36.9 °C) (03/19/24 0729)  Pulse: 107 (03/19/24 1053)  Resp: 14 (03/19/24 0729)  BP: (!) 111/46 (03/19/24 0729)  SpO2: 96 % (03/19/24 1053) Vital Signs (24h Range):  Temp:  [97.3 °F (36.3 °C)-99.1 °F (37.3 °C)] 98.4 °F (36.9 °C)  Pulse:  [] 107  Resp:  [14-18] 14  SpO2:  [90 %-99 %] 96 %  BP: (105-125)/(46-65) 111/46     Weight: 114.3 kg (252 lb)  Body mass index is 37.21 kg/m².    Intake/Output Summary (Last 24 hours) at 3/19/2024 1136  Last data filed at 3/19/2024 0915  Gross per 24 hour   Intake 1307 ml   Output 425 ml   Net 882 ml         Physical Exam  Vitals and nursing note reviewed.   Constitutional:       General: He is not in acute distress.     Appearance: He is not ill-appearing.   Eyes:      Extraocular Movements: Extraocular movements intact.   Cardiovascular:      Rate and Rhythm: Normal rate and regular rhythm.      Heart sounds: Normal heart sounds. No murmur heard.  Pulmonary:      Effort: No respiratory distress.      Breath sounds: Normal breath sounds. No wheezing.   Abdominal:      General: There is no distension.      Palpations: Abdomen is soft.       Tenderness: There is no abdominal tenderness.   Musculoskeletal:         General: No tenderness.      Right lower leg: No edema.      Left lower leg: No edema.      Comments: BUE contractures   Skin:     General: Skin is warm and dry.   Neurological:      Mental Status: He is alert and oriented to person, place, and time. Mental status is at baseline.   Psychiatric:         Mood and Affect: Mood normal.         Behavior: Behavior normal.             Significant Labs: All pertinent labs within the past 24 hours have been reviewed.  BMP:   Recent Labs   Lab 03/19/24  0605   GLU 91   *   K 3.9      CO2 24   BUN 15   CREATININE 0.8   CALCIUM 8.5*   MG 1.7     CBC:   Recent Labs   Lab 03/18/24  0715 03/19/24  0605 03/19/24  1046   WBC 7.31 8.69  --    HGB 7.0* 6.8* 7.1*   HCT 22.0* 21.2*  --    * 600*  --        Significant Imaging: I have reviewed all pertinent imaging results/findings within the past 24 hours.    Assessment/Plan:      * Acute encephalopathy  Resolved  Presented from NH with altered mentation, initially intubated for airway protection 3/4, extubated 3/10. Likely acute on chronic in the setting of history of CVA with likely low cognitive reserve at baseline, and exacerbated by UTI. Varying accounts of baseline makes evaluation difficult. Per daughter: Has not been functionally independent since November (before December admission), currently lives in assisted living and requires assstance with ADLs. Mobility significantly impaird. AOx3 4-5 days out of the week, at other times will be confused and disoriented. 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 CTH on admission unremarkable for acute process. MRI spine obtained on 3/10 showing chronic dicitis  - Status post treatment for UTI  - Neurology on board, expect extended return to baseline given significant infection in the setting of fairly low cognitive reserve, continue  "treatment of active medical issues  -Contacted neuro 3/13 to discuss LP/further workup since patient not near baseline   Per radiology, patient's anatomy should not limit bedside LP attempt.    Neurology to attempt bedside LP 3/15 > deferred for repeat imaging   Repeat imaging limited by motion artifact, neurology deferring further intervention as mental status is improved 3/16  - NG tube to be removed        Clostridium difficile infection  Significant diarrhea reported by nursing AM 3/15. No bowel regimen for over 48 hours and recent abx use. Concern for c diff vs tube feeds > c diff positive.  Last episode of diarrhea 3/16.  - oral vanc started 3/16   - EOT 3/26  - cannot return to SNF on contact precautions  - contact precautions cannot be discontinued until treatment is completed    Dysphagia  Dysphagia in the setting of encephalopathy. Required NG tube for prolonged period of time before mental status returned to baseline. Now back on diet as per SLP  - SLP following  - nutrition following    Sepsis  Resolved  This patient does have evidence of infective focus  My overall impression is sepsis.  Source: Urinary Tract  Antibiotics given-   Antibiotics (72h ago, onward)      Start     Stop Route Frequency Ordered    03/16/24 0800  vancomycin 125 mg/5 mL oral solution 125 mg  (C. difficile Infection (CDI) Treatment Order Panel)         03/26/24 1159 PER NG TUBE Every 6 hours 03/16/24 0747          Latest lactate reviewed-  No results for input(s): "LACTATE", "POCLAC" in the last 72 hours.  Organ dysfunction indicated by Encephalopathy    Fluid challenge Not needed - patient is not hypotensive      Post- resuscitation assessment No - Post resuscitation assessment not needed       Will Not start Pressors- Levophed for MAP of 65  Source control achieved by: antibiotics    Urinary tract infection without hematuria  now s/p 7 days zosyn  > passed voiding trial 3/18      Diarrhea  Resolved  Significant diarrhea reported " by nursing AM 3/15. No bowel regimen for over 48 hours and recent abx use. Concern for c diff vs tube feeds > c diff positive  - oral vanc started 3/16       Chronic indwelling Bryant catheter  REMOVED  Patient admitted to hospital with bryant. Unclear if it was in place for retention or due to history of sacral wound. Patient has not been evaluated by urology.   - passed voiding trial 3/18  - outpatient follow-up with urology     Second degree AV block, Mobitz type I  Stable  Concern for CHB overnight 3/6  -EP: Likely indicative of periods of increased vagal tone or disordered sleep breathing, no evidence of high-degree block noted therefore no indication for PPM/transcutaneous pacing        Hypoglycemia  - no further episodes      Hypothermia  Resolved      Hemiparesis  - history noted  - PT/OT      Anemia  Patient's anemia is currently controlled. Has not received any PRBCs to date. Etiology likely d/t  chronic disease vs KIMMY, iatrogenic  Current CBC reviewed-   Lab Results   Component Value Date    HGB 7.1 (L) 03/19/2024    HCT 21.2 (L) 03/19/2024     Monitor serial CBC and transfuse if patient becomes hemodynamically unstable, symptomatic or H/H drops below 7/21.    Hyponatremia  Patient has hyponatremia which is controlled,We will aim to correct the sodium by 4-6mEq in 24 hours. We will monitor sodium Daily. The hyponatremia is due to Dehydration/hypovolemia vs excessive free water flushes. The patient's sodium results have been reviewed and are listed below.  Recent Labs   Lab 03/19/24  0605   *         History of CVA (cerebrovascular accident)  History of R sided hemiparesis from prior CVA        VTE Risk Mitigation (From admission, onward)           Ordered     enoxaparin injection 40 mg  Every 24 hours         03/12/24 1008     IP VTE HIGH RISK PATIENT  Once         03/04/24 0316     Place sequential compression device  Until discontinued         03/04/24 0316                    Discharge Planning    GLORIA: 3/25/2024     Code Status: Full Code   Is the patient medically ready for discharge?: No    Reason for patient still in hospital (select all that apply): Treatment  Discharge Plan A: Return to nursing home   Discharge Delays: None known at this time              Anum Sousa MD  Department of Hospital Medicine   [facility] - Transplant Stepdown

## 2024-03-19 NOTE — ASSESSMENT & PLAN NOTE
REMOVED  Patient admitted to hospital with bryant. Unclear if it was in place for retention or due to history of sacral wound. Patient has not been evaluated by urology.   - passed voiding trial 3/18  - outpatient follow-up with urology

## 2024-03-19 NOTE — PLAN OF CARE
Problem: Infection  Goal: Absence of Infection Signs and Symptoms  Outcome: Ongoing, Progressing     Problem: Adult Inpatient Plan of Care  Goal: Plan of Care Review  Outcome: Ongoing, Progressing  Goal: Patient-Specific Goal (Individualized)  Outcome: Ongoing, Progressing  Goal: Absence of Hospital-Acquired Illness or Injury  Outcome: Ongoing, Progressing  Goal: Optimal Comfort and Wellbeing  Outcome: Ongoing, Progressing  Goal: Readiness for Transition of Care  Outcome: Ongoing, Progressing     Problem: Adjustment to Illness (Sepsis/Septic Shock)  Goal: Optimal Coping  Outcome: Ongoing, Progressing     Problem: Bleeding (Sepsis/Septic Shock)  Goal: Absence of Bleeding  Outcome: Ongoing, Progressing     Problem: Glycemic Control Impaired (Sepsis/Septic Shock)  Goal: Blood Glucose Level Within Desired Range  Outcome: Ongoing, Progressing     Problem: Infection Progression (Sepsis/Septic Shock)  Goal: Absence of Infection Signs and Symptoms  Outcome: Ongoing, Progressing     Problem: Nutrition Impaired (Sepsis/Septic Shock)  Goal: Optimal Nutrition Intake  Outcome: Ongoing, Progressing     Problem: Fluid and Electrolyte Imbalance (Acute Kidney Injury/Impairment)  Goal: Fluid and Electrolyte Balance  Outcome: Ongoing, Progressing     Problem: Oral Intake Inadequate (Acute Kidney Injury/Impairment)  Goal: Optimal Nutrition Intake  Outcome: Ongoing, Progressing     Problem: Renal Function Impairment (Acute Kidney Injury/Impairment)  Goal: Effective Renal Function  Outcome: Ongoing, Progressing     Problem: Impaired Wound Healing  Goal: Optimal Wound Healing  Outcome: Ongoing, Progressing     Problem: Skin Injury Risk Increased  Goal: Skin Health and Integrity  Outcome: Ongoing, Progressing     Problem: Fall Injury Risk  Goal: Absence of Fall and Fall-Related Injury  Outcome: Ongoing, Progressing      No

## 2024-03-19 NOTE — ASSESSMENT & PLAN NOTE
Resolved  Significant diarrhea reported by nursing AM 3/15. No bowel regimen for over 48 hours and recent abx use. Concern for c diff vs tube feeds > c diff positive  - oral vanc started 3/16

## 2024-03-19 NOTE — ASSESSMENT & PLAN NOTE
Patient's anemia is currently controlled. Has not received any PRBCs to date. Etiology likely d/t  chronic disease vs KIMMY, iatrogenic  Current CBC reviewed-   Lab Results   Component Value Date    HGB 7.1 (L) 03/19/2024    HCT 21.2 (L) 03/19/2024     Monitor serial CBC and transfuse if patient becomes hemodynamically unstable, symptomatic or H/H drops below 7/21.

## 2024-03-19 NOTE — ASSESSMENT & PLAN NOTE
Significant diarrhea reported by nursing AM 3/15. No bowel regimen for over 48 hours and recent abx use. Concern for c diff vs tube feeds > c diff positive.  Last episode of diarrhea 3/16.  - oral vanc started 3/16   - EOT 3/26  - cannot return to SNF on contact precautions  - contact precautions cannot be discontinued until treatment is completed

## 2024-03-19 NOTE — ASSESSMENT & PLAN NOTE
Stable  Concern for CHB overnight 3/6  -EP: Likely indicative of periods of increased vagal tone or disordered sleep breathing, no evidence of high-degree block noted therefore no indication for PPM/transcutaneous pacing

## 2024-03-19 NOTE — PT/OT/SLP PROGRESS
"Physical Therapy Treatment  Co Treatment with Occupational Therapy     Patient Name:  Bj Pate Jr.   MRN:  [id number]    Recommendations:     Discharge Recommendations: Moderate Intensity Therapy  Discharge Equipment Recommendations: to be determined by next level of care  Barriers to discharge: Decreased caregiver support    Assessment:     Bj Pate Jr. is a 74 y.o. male admitted with a medical diagnosis of Acute encephalopathy.  He presents with the following impairments/functional limitations: weakness, gait instability, impaired balance, impaired endurance, impaired cognition, impaired self care skills, impaired functional mobility, pain, decreased lower extremity function, decreased upper extremity function, decreased ROM, impaired cardiopulmonary response to activity, impaired joint extensibility, abnormal tone, impaired skin, edema, impaired coordination.    Pt agreeable for therapy at this time. Pt tolerates session fairly with emphasis on bed mobility, sitting balance, and therex. Pt requires significant assistance x2 persons for bed mobility and sitting balance at this time. Pt will continue to benefit from skilled therapy services.    Rehab Prognosis: Fair; patient would benefit from acute skilled PT services to address these deficits and reach maximum level of function.    Recent Surgery: * No surgery found *      Plan:     During this hospitalization, patient to be seen 2 x/week to address the identified rehab impairments via therapeutic activities, therapeutic exercises, neuromuscular re-education and progress toward the following goals:    Plan of Care Expires:  04/10/24    Subjective     Chief Complaint: Pain everywhere  Patient/Family Comments/goals: "Im gonna try"  Pain/Comfort:  Pain Rating 1: other (see comments) (Pain not rated)  Location - Orientation 1: generalized  Location 1:  ("everywhere")  Pain Addressed 1: Reposition, Distraction  Pain Rating Post-Intervention 1: other (see " comments) (Pain not rated)      Objective:     Communicated with RN (Willy) prior to session.  Patient found HOB elevated with telemetry, pulse ox (continuous), Condom Catheter upon PTA entry to room.     General Precautions: Standard, fall, aspiration  Orthopedic Precautions: N/A  Braces: N/A  Respiratory Status: Room air     Functional Mobility:  Bed Mobility:     Rolling Left:  total assistance x2 persons  Rolling Right: total assistance x2 persons  Scooting: anteriorly to EOB total assistance x2 persons; to HOB total assistance x2 persons  Supine to Sit: total assistance x2 persons for Trunk/BLEs  Sit to Supine: total assistance x2 persons for Trunk/BLEs  Balance:   Static sitting: Pt sits at EOB ~25 minutes total assist x2 persons with B knees blocked with poor balance 2/2 to R posterior lean.   Dynamic sitting: Time spent with attempts at anterior weight shifting with tapping to abdominals to help facilitate contraction with pt unable.   Time spent performing light stretching of BUE to allow pt to perform reaching activity with minimal decrease in flexor tone of BUE.       AM-PAC 6 CLICK MOBILITY  Turning over in bed (including adjusting bedclothes, sheets and blankets)?: 2  Sitting down on and standing up from a chair with arms (e.g., wheelchair, bedside commode, etc.): 1  Moving from lying on back to sitting on the side of the bed?: 2  Moving to and from a bed to a chair (including a wheelchair)?: 1  Need to walk in hospital room?: 1  Climbing 3-5 steps with a railing?: 1  Basic Mobility Total Score: 8       Treatment & Education:  Patient provided with daily orientation and goals of this PT session.     Pt performs the following BLE therex in supine:  X15 AP    Pt educated to call for assistance and to transfer with hospital staff only.  Also, pt was educated on the effects of prolonged immobility and the importance of performing OOB activity and exercises to promote healing and reduce recovery time.    Co  tx performed with OT due to patient need for 2 skilled therapist to ensure patient and staff safety and to accommondate for activity tolerance.      Patient left HOB elevated with all lines intact, call button in reach, and RN notified.    GOALS:   Multidisciplinary Problems       Physical Therapy Goals          Problem: Physical Therapy    Goal Priority Disciplines Outcome Goal Variances Interventions   Physical Therapy Goal     PT, PT/OT Ongoing, Progressing     Description: Goals to be met by: 4/10/24     Patient will increase functional independence with mobility by performin. Supine to sit with Moderate Assistance  2. Sitting at edge of bed x10 minutes with Minimal Assistance to perform functional activities.   3. Lower extremity exercise program x10 reps per handout, with assistance as needed to increase strength for increased functional mobility.                          Time Tracking:     PT Received On: 24  PT Start Time: 928     PT Stop Time: 100  PT Total Time (min): 41 min     Billable Minutes: Therapeutic Activity 18 and Therapeutic Exercise 23    Treatment Type: Treatment  PT/PTA: PTA     Number of PTA visits since last PT visit: 2     2024

## 2024-03-19 NOTE — ASSESSMENT & PLAN NOTE
Patient has hyponatremia which is controlled,We will aim to correct the sodium by 4-6mEq in 24 hours. We will monitor sodium Daily. The hyponatremia is due to Dehydration/hypovolemia vs excessive free water flushes. The patient's sodium results have been reviewed and are listed below.  Recent Labs   Lab 03/19/24  0605   *

## 2024-03-19 NOTE — PLAN OF CARE
AAOx1. Afebrile. New IV left wrist 20g placed. Pt had one loose BM. Incontinence pads changed. Antifungal powder applied.  Pt tolerated PO chlorhexidine and Vanc well. C.diff precautions maintained. Pt in lowest position, side rails upx2, nonskid footwear in place, call light within reach.

## 2024-03-19 NOTE — ASSESSMENT & PLAN NOTE
Dysphagia in the setting of encephalopathy. Required NG tube for prolonged period of time before mental status returned to baseline. Now back on diet as per SLP  - SLP following  - nutrition following

## 2024-03-19 NOTE — PLAN OF CARE
"10:27 AM  The SW contacted [facility] to follow-up regarding this patient's d/c planning. The representative with [facility] reported that the patient is a current there with [facility]. The representative reported that the patient has to be off C diff precautions before he is able to return to [facility].   The patient's care team was notified via secure chat regarding the above information.     12:57 PM  The SW received a phone call from Oswaldo with admission with [facility] requesting updated clinicals. The SW manually faxed updated clinicals to [facility] for review.       Your fax has been successfully sent to [phone] at [phone].  ------------------------------------------------------------  From: [id number]  ------------------------------------------------------------  3/19/2024 1:03:42 PM Transmission Record   Sent to [phone] with remote ID "[id number]"   Result: (0/339;0/0) Success   Page record: 1 - 41   Elapsed time: 12:40 on channel 36        Lily Castillo LMSW  Case Management [facility]    "

## 2024-03-20 PROCEDURE — 20600001 HC STEP DOWN PRIVATE ROOM

## 2024-03-20 PROCEDURE — 94761 N-INVAS EAR/PLS OXIMETRY MLT: CPT

## 2024-03-20 PROCEDURE — 63600175 PHARM REV CODE 636 W HCPCS

## 2024-03-20 PROCEDURE — 63600175 PHARM REV CODE 636 W HCPCS: Performed by: STUDENT IN AN ORGANIZED HEALTH CARE EDUCATION/TRAINING PROGRAM

## 2024-03-20 PROCEDURE — 25000003 PHARM REV CODE 250: Performed by: STUDENT IN AN ORGANIZED HEALTH CARE EDUCATION/TRAINING PROGRAM

## 2024-03-20 PROCEDURE — 99900035 HC TECH TIME PER 15 MIN (STAT)

## 2024-03-20 PROCEDURE — 25000003 PHARM REV CODE 250

## 2024-03-20 PROCEDURE — 94664 DEMO&/EVAL PT USE INHALER: CPT

## 2024-03-20 PROCEDURE — 27000207 HC ISOLATION

## 2024-03-20 RX ADMIN — VANCOMYCIN HYDROCHLORIDE 125 MG: KIT at 10:03

## 2024-03-20 RX ADMIN — VANCOMYCIN HYDROCHLORIDE 125 MG: KIT at 05:03

## 2024-03-20 RX ADMIN — VANCOMYCIN HYDROCHLORIDE 125 MG: KIT at 12:03

## 2024-03-20 RX ADMIN — MICONAZOLE NITRATE 2 % TOPICAL POWDER: at 08:03

## 2024-03-20 RX ADMIN — SODIUM CHLORIDE, POTASSIUM CHLORIDE, SODIUM LACTATE AND CALCIUM CHLORIDE 500 ML: 600; 310; 30; 20 INJECTION, SOLUTION INTRAVENOUS at 03:03

## 2024-03-20 RX ADMIN — ENOXAPARIN SODIUM 40 MG: 40 INJECTION SUBCUTANEOUS at 05:03

## 2024-03-20 RX ADMIN — MICONAZOLE NITRATE 2 % TOPICAL POWDER: at 07:03

## 2024-03-20 RX ADMIN — CHLORHEXIDINE GLUCONATE 0.12% ORAL RINSE 15 ML: 1.2 LIQUID ORAL at 08:03

## 2024-03-20 RX ADMIN — CHLORHEXIDINE GLUCONATE 0.12% ORAL RINSE 15 ML: 1.2 LIQUID ORAL at 07:03

## 2024-03-20 RX ADMIN — ATORVASTATIN CALCIUM 40 MG: 40 TABLET, FILM COATED ORAL at 08:03

## 2024-03-20 RX ADMIN — SILODOSIN 4 MG: 4 CAPSULE ORAL at 08:03

## 2024-03-20 NOTE — SUBJECTIVE & OBJECTIVE
Interval History: Patient's telemetry malfunctioning on rounds today. Denies acute complaints. Denies chest pain, SOB, n/v, c/d.       Review of Systems  Objective:     Vital Signs (Most Recent):  Temp: 98.3 °F (36.8 °C) (03/20/24 0814)  Pulse: (!) 115 (03/20/24 1054)  Resp: 16 (03/20/24 0814)  BP: 110/64 (03/20/24 0814)  SpO2: 96 % (03/20/24 0814) Vital Signs (24h Range):  Temp:  [98.3 °F (36.8 °C)-100.3 °F (37.9 °C)] 98.3 °F (36.8 °C)  Pulse:  [] 115  Resp:  [14-16] 16  SpO2:  [91 %-98 %] 96 %  BP: (106-122)/(55-68) 110/64     Weight: 114 kg (251 lb 4.5 oz)  Body mass index is 37.11 kg/m².    Intake/Output Summary (Last 24 hours) at 3/20/2024 1333  Last data filed at 3/20/2024 0512  Gross per 24 hour   Intake 350 ml   Output 502 ml   Net -152 ml         Physical Exam  Vitals and nursing note reviewed.   Constitutional:       General: He is not in acute distress.     Appearance: He is not ill-appearing.   HENT:      Head: Normocephalic.   Eyes:      General: No scleral icterus.     Extraocular Movements: Extraocular movements intact.   Cardiovascular:      Rate and Rhythm: Regular rhythm. Tachycardia present.      Heart sounds: Normal heart sounds. No murmur heard.  Pulmonary:      Effort: No respiratory distress.      Breath sounds: Normal breath sounds. No wheezing.   Abdominal:      General: There is no distension.      Palpations: Abdomen is soft.      Tenderness: There is no abdominal tenderness.   Musculoskeletal:         General: No tenderness.      Right lower leg: No edema.      Left lower leg: No edema.      Comments: BUE contractures   Skin:     General: Skin is warm and dry.   Neurological:      Mental Status: He is alert and oriented to person, place, and time. Mental status is at baseline.   Psychiatric:         Mood and Affect: Mood normal.         Behavior: Behavior normal.             Significant Labs: All pertinent labs within the past 24 hours have been reviewed.    Significant Imaging: I  have reviewed all pertinent imaging results/findings within the past 24 hours.

## 2024-03-20 NOTE — PROGRESS NOTES
"[facility] - Transplant Stepdown  Adult Nutrition  Progress Note    SUMMARY       Recommendations    1. Continue Regular diet with minced and moist (level 5) texture or per SLP     2. Continue Boost Plus TID     3. RD to monitor and follow    Goals: Meet % EEN, EPN by RD f/u  Nutrition Goal Status: progressing towards goal  Communication of RD Recs:  (POC)    Assessment and Plan    Nutrition Problem  Inadequate energy intake      Related to (etiology):   Physiological needs      Signs and Symptoms (as evidenced by):   PO intake <75%      Interventions (treatment strategy):  Collaboration of nutrition care w/ other providers      Nutrition Diagnosis Status:   Continues    Reason for Assessment    Reason For Assessment: RD follow-up  Diagnosis: other (see comments)  Relevant Medical History: CKD stage 3, stroke  Interdisciplinary Rounds: did not attend    General Information Comments:   RD f/u. Pt previously on TF, d/c on 3/18 and diet advanced to level 5. Pt tolerating Boost, juice and pudding per RN's note. Reports not eating much meals, complaint food has no taste. Encouraged PO intake. Pt with C.diff, diarrhea was resolved. Seen by SLP on 3/18, rec'd level 5 diet. Pt unsure of UBW. Per wt hx, pt weighed 280-290 lb in 2023.  lb indicated wt loss of 29 lb (10%) x 6 months. NFPE completed today, no wasting noted. 1+ edema per chart. Does not meet criteria for malnutrition at this time, but is at risk if PO intake remains low.     Nutrition Discharge Planning: pending clinical course    Nutrition Risk Screen    Nutrition Risk Screen: difficulty chewing/swallowing    Nutrition/Diet History    Spiritual, Cultural Beliefs, Adventist Practices, Values that Affect Care: no  Food Allergies: other (see comments) (shrimp)    Anthropometrics    Temp: 98.3 °F (36.8 °C)  Height Method: Stated  Height: 5' 9" (175.3 cm)  Height (inches): 69 in  Weight Method: Bed Scale  Weight: 114 kg (251 lb 4.5 oz)  Weight (lb): " 251.28 lb  Ideal Body Weight (IBW), Male: 160 lb  % Ideal Body Weight, Male (lb): 157.5 %  BMI (Calculated): 37.1  BMI Grade: 35 - 39.9 - obesity - grade II       Lab/Procedures/Meds    Pertinent Labs Reviewed: reviewed  Pertinent Labs Comments: Na 132, albumin 1.4, phos 2.6  Pertinent Medications Reviewed: reviewed  Pertinent Medications Comments: lactated ringers, vancomycin, atorvastatin    Estimated/Assessed Needs    Weight Used For Calorie Calculations: 113.9 kg (251 lb)  Energy Calorie Requirements (kcal): 1878 kcal  Energy Need Method: [place]2or  Protein Requirements: 109-145g (1.5-2g/kg IBW)  Weight Used For Protein Calculations: 72.6 kg (160 lb) (IBW)  Fluid Requirements (mL): 1ml/kcal or per MD  Estimated Fluid Requirement Method: RDA Method  RDA Method (mL): 1878     Nutrition Prescription Ordered    Current Diet Order: level 5 minced and moist  Current Nutrition Support Formula Ordered:  (-)  Current Nutrition Support Rate Ordered:  (-)  Current Nutrition Support Frequency Ordered: -  Oral Nutrition Supplement: Boost plus    Evaluation of Received Nutrient/Fluid Intake    Enteral Calories (kcal):  (-)  Enteral Protein (gm):  (-)  Enteral (Free Water) Fluid (mL):  (-)  % Kcal Needs: -  % Protein Needs: -  I/O: + 4.4 L since 3/6  Energy Calories Required: not meeting needs  Protein Required: not meeting needs  Fluid Required: not meeting needs  Total Fluid Intake (mL/kg): 1 ml or fluid per MD  Comments: LBM 3/19  Tolerance: tolerating  % Intake of Estimated Energy Needs: 25 - 50 %  % Meal Intake: 25 - 50 %    Nutrition Risk    Level of Risk/Frequency of Follow-up:  (1x/week)     Monitor and Evaluation    Food and Nutrient Intake: energy intake, food and beverage intake  Food and Nutrient Adminstration: diet order, enteral and parenteral nutrition administration  Knowledge/Beliefs/Attitudes: food and nutrition knowledge/skill, beliefs and attitudes  Physical Activity and Function: nutrition-related  ADLs and IADLs, factors affecting access to physical activity  Anthropometric Measurements: height/length, weight, weight change, other (specify), growth pattern indices/percentile ranks, body mass index  Biochemical Data, Medical Tests and Procedures: electrolyte and renal panel, gastrointestinal profile, glucose/endocrine profile, inflammatory profile, lipid profile  Nutrition-Focused Physical Findings: overall appearance, extremities, muscles and bones, head and eyes, skin     Nutrition Follow-Up    RD Follow-up?: Yes

## 2024-03-20 NOTE — NURSING
Pt is AAOx2-3 to person, place, and situation, GCS 14, admitted on 3/4 dx w/ acute encephalopathy. Pt is on RA cont. Pulse ox maintaining sats in high 90s. SBP in low 100s-110s, SR-ST on tele 80s-low 100s, Tmax:100.3F. 1 formed stool this shift-Cdiff precautions remains in place. Safety monitored, monitoring ongoing, Q2H turns completed.   -Neuros Q4H stable   -Oral Vanc cont. This shift      no

## 2024-03-20 NOTE — PROGRESS NOTES
[facility] - [facility]plant Stepdown  Wound Care    Patient Name:  Bj Pate Jr.   MRN:  [id number]  Date: 3/20/2024  Diagnosis: Acute encephalopathy    History:     Past Medical History:   Diagnosis Date    Acute on chronic systolic heart failure 12/13/2023    Anemia 06/04/2021    Anticoagulant long-term use     Basal ganglia hemorrhage     Left basal ganglia hemorrhage with resultant right-sided hemiparesis which has resolved.     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        Social History     Socioeconomic History    Marital status:     Number of children: 8    Highest education level: 11th grade   Occupational History    Occupation:    Tobacco Use    Smoking status: Never    Smokeless tobacco: Never   Substance and Sexual Activity    Alcohol use: Yes     Alcohol/week: 0.0 standard drinks of alcohol     Comment: occacionally    Drug use: No    Sexual activity: Not Currently     Social Determinants of Health     Financial Resource Strain: Low Risk  (3/5/2024)    Overall Financial Resource Strain (CARDIA)     Difficulty of Paying Living Expenses: Not very hard   Food Insecurity: No Food Insecurity (3/5/2024)    Hunger Vital Sign     Worried About Running Out of Food in the Last Year: Never true     Ran Out of Food in the Last Year: Never true   Transportation Needs: No Transportation Needs (3/5/2024)    PRAPARE - Transportation     Lack of  Transportation (Medical): No     Lack of Transportation (Non-Medical): No   Physical Activity: Inactive (3/5/2024)    Exercise Vital Sign     Days of Exercise per Week: 0 days     Minutes of Exercise per Session: 0 min   Stress: Patient Declined (12/12/2023)    [employer] [place] [employer] - Occupational Stress Questionnaire     Feeling of Stress : Patient declined   Social Connections: Unknown (3/5/2024)    Social Connection and Isolation Panel [NHANES]     Frequency of Communication with Friends and Family: More than three times a week     Frequency of Social Gatherings with Friends and Family: More than three times a week     Attends Catholic Services: Patient declined     Active Member of Clubs or Organizations: Patient declined     Attends Club or Organization Meetings: Patient declined   Housing Stability: Unknown (3/5/2024)    Housing Stability Vital Sign     Unable to Pay for Housing in the Last Year: No     Unstable Housing in the Last Year: No       Precautions:     Allergies as of 03/04/2024 - Reviewed 03/04/2024   Allergen Reaction Noted    Tomato (solanum lycopersicum) Hives 09/26/2013    Naproxen Hives 10/22/2012    Shrimp Other (See Comments) 03/07/2017       [facility] Assessment Details/Treatment     Patient seen for wound care consultation.   Reviewed chart for this encounter.   See Flow Sheet for findings.      RECOMMENDATIONS: Patient Aox2-3 and agreeable to inpatient wound care. Follow up for buttocks and sacral spine. Blanchable area of erythema noted. Wound healing progressing. Patient is not having loose stools according to primary RN. Continue current wound care orders of triad BID and PRN. No other needs or concerns at this time. Will follow up 3/27/2024 or sooner if needed.    Discussed POC with patient and primary nurse.   See EMR for orders & patient education.    Discussed nutrition and the role of protein in wound healing with the patient. Instructed patient to optimize protein  for wound healing.    Bedside nursing to continue care & monitoring.  Bedside nursing to maintain pressure injury prevention interventions.            03/20/24 1005   WOCN Assessment   WOCN Total Time (mins) 45   Visit Date 03/20/24   Visit Time 1005   Consult Type Follow Up   WOCN Speciality Wound   Intervention assessed;changed;applied;chart review;coordination of care;orders   Teaching on-going        Altered Skin Integrity 03/04/24 0215 midline Sacral spine #1 Partial thickness tissue loss. Shallow open ulcer with a red or pink wound bed, without slough. Intact or Open/Ruptured Serum-filled blister.   Date First Assessed/Time First Assessed: 03/04/24 0215   Altered Skin Integrity Present on Admission - Did Patient arrive to the hospital with altered skin?: yes  Orientation: midline  Location: Sacral spine  Wound Number: #1  Description of Altered S...   Wound Image    Dressing Appearance Open to air;Dry;Intact;Clean   Drainage Amount None   Drainage Characteristics/Odor No odor   Appearance Pink;Red   Care Cleansed with:;Sterile normal saline;Skin Barrier   Dressing Applied       Orders placed.   Humberto Echeverria RN  03/20/2024

## 2024-03-20 NOTE — ASSESSMENT & PLAN NOTE
"Resolved  This patient does have evidence of infective focus  My overall impression is sepsis.  Source: Urinary Tract  Antibiotics given-   Antibiotics (72h ago, onward)    Start     Stop Route Frequency Ordered    03/19/24 1800  vancomycin 125 mg/5 mL oral solution 125 mg  (C. difficile Infection (CDI) Treatment Order Panel)         03/26/24 1159 Oral Every 6 hours 03/19/24 1422        Latest lactate reviewed-  No results for input(s): "LACTATE", "POCLAC" in the last 72 hours.  Organ dysfunction indicated by Encephalopathy    Fluid challenge Not needed - patient is not hypotensive      Post- resuscitation assessment No - Post resuscitation assessment not needed       Will Not start Pressors- Levophed for MAP of 65  Source control achieved by: antibiotics  "

## 2024-03-20 NOTE — PT/OT/SLP PROGRESS
Physical Therapy  Continue Current Plan of Care     Patient Name:  Bj Pate Jr.   MRN:  [id number]  Admitting Diagnosis:  Acute encephalopathy   Recent Surgery: * No surgery found *    Admit Date: 3/4/2024  Length of Stay: 16 days    Patient not seen on this date, however per chart review pt continues to benefit from acute PT services. PT to follow up as POC allows. Please continue progressive mobility as appropriate.    Will follow up: 3/22/24

## 2024-03-20 NOTE — PROGRESS NOTES
[facility] - Transplant [facility] Medicine  Progress Note    Patient Name: Bj Pate Jr.  MRN: [id number]  Patient Class: IP- Inpatient   Admission Date: 3/4/2024  Length of Stay: 16 days  Attending Physician: Anum Sousa MD  Primary Care Provider: Jose Antonio Foster MD        Subjective:     Principal Problem:Acute encephalopathy        HPI:  73M with PMH CVA with R sided deficits, DM, HTN who presents to the ED from SNF for AMS. Per chart, he was more somnolent than usual over the last 2 days. At baseline he is verbal and follows commands but does not ambulate. On arrival to ED he was hypothermic to 87.9F and found to be hypoglycemic to the 50s. Intermittently hypotensive to MAP 55. Patient is awake but does not interact meaningfully on exam. Chronic Rivera was initially draining cloudy urine with sediment, Rivera exchanged. Initial labs without leukocytosis, CMP unremarkable, VBG with pH 7.49/30.3/38.9, lactate normal. Received 1L NS bolus through warming pump. Initially admitted to , critical care consulted for severe hypothermia with hypotension.    Overview/Hospital Course:  Admitted to the ED from SNF with acute encephalopathy. Initially admitted to hospital medicine however critical care consulted for severe hypothermia and hypotension.  Intubated 3/4 for airway protection, eventually extubated 3/10.  Workup notable for Citrobacter farmeri UTI, antibiotics tailored accordingly.  Neurology has been on board due to ongoing encephalopathy.  Recommending continued treatment of active medical issues.  Admission further complicated by heart block, likely Wenckebach for which EP was consulted. Likely indicative of periods of increased vagal tone or disordered sleep breathing, no evidnece of high degree block recorded, no indication for PPM. In addition, has been relatively intolerant of p.o. intake, NG tube has been placed with tube feeds started. Failed MBSS. Neurology following, deferred LP for  repeat spinal imaging. Quality limited by motion artifact. Patient's mental status near baseline by 3/15. Cleared for PO by SLP on 3/18. NG tube to be removed. Patient with unclear history of urinary retention and chronic bryant. Bryant catheter removed AM 3/18, passed voiding trial. Diarrhea reported by nursing 3/15, c diff positive, started on oral vanc. Last episode of diarrhea documented on 3/16. Per patient's SNF, patient must be off contact precautions before returning. Patient to remain inpatient until completion of treatment for c diff on 3/26 when contact precautions can be discontinued as per hospital protocol.     Interval History: Patient's telemetry malfunctioning on rounds today. Denies acute complaints. Denies chest pain, SOB, n/v, c/d.       Review of Systems  Objective:     Vital Signs (Most Recent):  Temp: 98.3 °F (36.8 °C) (03/20/24 0814)  Pulse: (!) 115 (03/20/24 1054)  Resp: 16 (03/20/24 0814)  BP: 110/64 (03/20/24 0814)  SpO2: 96 % (03/20/24 0814) Vital Signs (24h Range):  Temp:  [98.3 °F (36.8 °C)-100.3 °F (37.9 °C)] 98.3 °F (36.8 °C)  Pulse:  [] 115  Resp:  [14-16] 16  SpO2:  [91 %-98 %] 96 %  BP: (106-122)/(55-68) 110/64     Weight: 114 kg (251 lb 4.5 oz)  Body mass index is 37.11 kg/m².    Intake/Output Summary (Last 24 hours) at 3/20/2024 1333  Last data filed at 3/20/2024 0512  Gross per 24 hour   Intake 350 ml   Output 502 ml   Net -152 ml         Physical Exam  Vitals and nursing note reviewed.   Constitutional:       General: He is not in acute distress.     Appearance: He is not ill-appearing.   HENT:      Head: Normocephalic.   Eyes:      General: No scleral icterus.     Extraocular Movements: Extraocular movements intact.   Cardiovascular:      Rate and Rhythm: Regular rhythm. Tachycardia present.      Heart sounds: Normal heart sounds. No murmur heard.  Pulmonary:      Effort: No respiratory distress.      Breath sounds: Normal breath sounds. No wheezing.   Abdominal:       General: There is no distension.      Palpations: Abdomen is soft.      Tenderness: There is no abdominal tenderness.   Musculoskeletal:         General: No tenderness.      Right lower leg: No edema.      Left lower leg: No edema.      Comments: BUE contractures   Skin:     General: Skin is warm and dry.   Neurological:      Mental Status: He is alert and oriented to person, place, and time. Mental status is at baseline.   Psychiatric:         Mood and Affect: Mood normal.         Behavior: Behavior normal.             Significant Labs: All pertinent labs within the past 24 hours have been reviewed.    Significant Imaging: I have reviewed all pertinent imaging results/findings within the past 24 hours.    Assessment/Plan:      * Acute encephalopathy  Resolved  Presented from NH with altered mentation, initially intubated for airway protection 3/4, extubated 3/10. Likely acute on chronic in the setting of history of CVA with likely low cognitive reserve at baseline, and exacerbated by UTI. Varying accounts of baseline makes evaluation difficult. Per daughter: Has not been functionally independent since November (before December admission), currently lives in assisted living and requires assstance with ADLs. Mobility significantly impaird. AOx3 4-5 days out of the week, at other times will be confused and disoriented. 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 CTH on admission unremarkable for acute process. MRI spine obtained on 3/10 showing chronic dicitis  - Status post treatment for UTI  - Neurology on board, expect extended return to baseline given significant infection in the setting of fairly low cognitive reserve, continue treatment of active medical issues  -Contacted neuro 3/13 to discuss LP/further workup since patient not near baseline   Per radiology, patient's anatomy should not limit bedside LP attempt.    Neurology to attempt bedside LP 3/15  "> deferred for repeat imaging   Repeat imaging limited by motion artifact, neurology deferring further intervention as mental status is improved 3/16  - NG tube to be removed        Clostridium difficile infection  Significant diarrhea reported by nursing AM 3/15. No bowel regimen for over 48 hours and recent abx use. Concern for c diff vs tube feeds > c diff positive.  Last episode of diarrhea 3/16.  - oral vanc started 3/16   - EOT 3/26  - cannot return to SNF on contact precautions  - contact precautions cannot be discontinued until treatment is completed    Dysphagia  Dysphagia in the setting of encephalopathy. Required NG tube for prolonged period of time before mental status returned to baseline. Now back on diet as per SLP  - SLP following  - nutrition following    Sepsis  Resolved  This patient does have evidence of infective focus  My overall impression is sepsis.  Source: Urinary Tract  Antibiotics given-   Antibiotics (72h ago, onward)      Start     Stop Route Frequency Ordered    03/19/24 1800  vancomycin 125 mg/5 mL oral solution 125 mg  (C. difficile Infection (CDI) Treatment Order Panel)         03/26/24 1159 Oral Every 6 hours 03/19/24 1422          Latest lactate reviewed-  No results for input(s): "LACTATE", "POCLAC" in the last 72 hours.  Organ dysfunction indicated by Encephalopathy    Fluid challenge Not needed - patient is not hypotensive      Post- resuscitation assessment No - Post resuscitation assessment not needed       Will Not start Pressors- Levophed for MAP of 65  Source control achieved by: antibiotics    Urinary tract infection without hematuria  now s/p 7 days zosyn  > passed voiding trial 3/18      Diarrhea  Resolved  Significant diarrhea reported by nursing AM 3/15. No bowel regimen for over 48 hours and recent abx use. Concern for c diff vs tube feeds > c diff positive  - oral vanc started 3/16       Chronic indwelling Rivera catheter  REMOVED  Patient admitted to hospital with " "bryant. Unclear if it was in place for retention or due to history of sacral wound. Patient has not been evaluated by urology.   - passed voiding trial 3/18  - outpatient follow-up with urology     Second degree AV block, Mobitz type I  Stable  Concern for CHB overnight 3/6  -EP: Likely indicative of periods of increased vagal tone or disordered sleep breathing, no evidence of high-degree block noted therefore no indication for PPM/transcutaneous pacing        Hypoglycemia  - no further episodes      Hypothermia  Resolved      Hemiparesis  - history noted  - PT/OT      Anemia  Patient's anemia is currently controlled. Has not received any PRBCs to date. Etiology likely d/t  chronic disease vs KIMMY, iatrogenic  Current CBC reviewed-   Lab Results   Component Value Date    HGB 7.1 (L) 03/19/2024    HCT 21.2 (L) 03/19/2024     Monitor serial CBC and transfuse if patient becomes hemodynamically unstable, symptomatic or H/H drops below 7/21.    Hyponatremia  Patient has hyponatremia which is controlled,We will aim to correct the sodium by 4-6mEq in 24 hours. We will monitor sodium Daily. The hyponatremia is due to Dehydration/hypovolemia vs excessive free water flushes. The patient's sodium results have been reviewed and are listed below.  No results for input(s): "NA" in the last 24 hours.      History of CVA (cerebrovascular accident)  History of R sided hemiparesis from prior CVA        VTE Risk Mitigation (From admission, onward)           Ordered     enoxaparin injection 40 mg  Every 24 hours         03/12/24 1008     IP VTE HIGH RISK PATIENT  Once         03/04/24 0316     Place sequential compression device  Until discontinued         03/04/24 0316                    Discharge Planning   GLORIA: 3/25/2024     Code Status: Full Code   Is the patient medically ready for discharge?: No    Reason for patient still in hospital (select all that apply): Treatment  Discharge Plan A: Return to nursing home   Discharge Delays: " None known at this time              Anum Sousa MD  Department of Hospital Medicine   [facility] - [facility] [place]

## 2024-03-20 NOTE — ASSESSMENT & PLAN NOTE
"Patient has hyponatremia which is controlled,We will aim to correct the sodium by 4-6mEq in 24 hours. We will monitor sodium Daily. The hyponatremia is due to Dehydration/hypovolemia vs excessive free water flushes. The patient's sodium results have been reviewed and are listed below.  No results for input(s): "NA" in the last 24 hours.    "

## 2024-03-20 NOTE — PLAN OF CARE
Recommendations    1. Continue Regular diet with minced and moist (level 5) texture or per SLP     2. Continue Boost Plus TID     3. RD to monitor and follow    Goals: Meet % EEN, EPN by RD f/u  Nutrition Goal Status: progressing towards goal  Communication of RD Recs:  (POC)

## 2024-03-21 PROBLEM — R00.1 BRADYCARDIA: Status: ACTIVE | Noted: 2024-03-21

## 2024-03-21 PROBLEM — I50.9 HEART FAILURE: Status: ACTIVE | Noted: 2024-03-21

## 2024-03-21 PROBLEM — R41.82 ALTERED MENTAL STATUS: Status: ACTIVE | Noted: 2024-03-21

## 2024-03-21 LAB
ALBUMIN SERPL BCP-MCNC: 1.3 G/DL (ref 3.5–5.2)
ALP SERPL-CCNC: 97 U/L (ref 55–135)
ALT SERPL W/O P-5'-P-CCNC: 28 U/L (ref 10–44)
ANION GAP SERPL CALC-SCNC: 6 MMOL/L (ref 8–16)
ANISOCYTOSIS BLD QL SMEAR: SLIGHT
AST SERPL-CCNC: 45 U/L (ref 10–40)
BASOPHILS # BLD AUTO: 0.03 K/UL (ref 0–0.2)
BASOPHILS NFR BLD: 0.2 % (ref 0–1.9)
BILIRUB SERPL-MCNC: 0.6 MG/DL (ref 0.1–1)
BLD PROD TYP BPU: NORMAL
BLOOD UNIT EXPIRATION DATE: NORMAL
BLOOD UNIT TYPE CODE: 7300
BLOOD UNIT TYPE: NORMAL
BUN SERPL-MCNC: 20 MG/DL (ref 8–23)
CALCIUM SERPL-MCNC: 9.1 MG/DL (ref 8.7–10.5)
CHLORIDE SERPL-SCNC: 103 MMOL/L (ref 95–110)
CO2 SERPL-SCNC: 26 MMOL/L (ref 23–29)
CODING SYSTEM: NORMAL
CREAT SERPL-MCNC: 1 MG/DL (ref 0.5–1.4)
CROSSMATCH INTERPRETATION: NORMAL
DIFFERENTIAL METHOD BLD: ABNORMAL
DISPENSE STATUS: NORMAL
EOSINOPHIL # BLD AUTO: 0 K/UL (ref 0–0.5)
EOSINOPHIL NFR BLD: 0.1 % (ref 0–8)
ERYTHROCYTE [DISTWIDTH] IN BLOOD BY AUTOMATED COUNT: 17.2 % (ref 11.5–14.5)
EST. GFR  (NO RACE VARIABLE): >60 ML/MIN/1.73 M^2
GLUCOSE SERPL-MCNC: 95 MG/DL (ref 70–110)
HCT VFR BLD AUTO: 20.2 % (ref 40–54)
HGB BLD-MCNC: 6.5 G/DL (ref 14–18)
HYPOCHROMIA BLD QL SMEAR: ABNORMAL
IMM GRANULOCYTES # BLD AUTO: 0.36 K/UL (ref 0–0.04)
IMM GRANULOCYTES NFR BLD AUTO: 2.5 % (ref 0–0.5)
LYMPHOCYTES # BLD AUTO: 1.1 K/UL (ref 1–4.8)
LYMPHOCYTES NFR BLD: 8 % (ref 18–48)
MAGNESIUM SERPL-MCNC: 1.7 MG/DL (ref 1.6–2.6)
MCH RBC QN AUTO: 27.1 PG (ref 27–31)
MCHC RBC AUTO-ENTMCNC: 32.2 G/DL (ref 32–36)
MCV RBC AUTO: 84 FL (ref 82–98)
MONOCYTES # BLD AUTO: 2.7 K/UL (ref 0.3–1)
MONOCYTES NFR BLD: 18.8 % (ref 4–15)
NEUTROPHILS # BLD AUTO: 9.9 K/UL (ref 1.8–7.7)
NEUTROPHILS NFR BLD: 70.4 % (ref 38–73)
NRBC BLD-RTO: 0 /100 WBC
OVALOCYTES BLD QL SMEAR: ABNORMAL
PHOSPHATE SERPL-MCNC: 2.8 MG/DL (ref 2.7–4.5)
PLATELET # BLD AUTO: 583 K/UL (ref 150–450)
PLATELET BLD QL SMEAR: ABNORMAL
PMV BLD AUTO: 10.1 FL (ref 9.2–12.9)
POIKILOCYTOSIS BLD QL SMEAR: SLIGHT
POTASSIUM SERPL-SCNC: 4 MMOL/L (ref 3.5–5.1)
PROT SERPL-MCNC: 6.5 G/DL (ref 6–8.4)
RBC # BLD AUTO: 2.4 M/UL (ref 4.6–6.2)
SODIUM SERPL-SCNC: 135 MMOL/L (ref 136–145)
TARGETS BLD QL SMEAR: ABNORMAL
TRANS ERYTHROCYTES VOL PATIENT: NORMAL ML
WBC # BLD AUTO: 14.14 K/UL (ref 3.9–12.7)

## 2024-03-21 PROCEDURE — 25000003 PHARM REV CODE 250: Performed by: STUDENT IN AN ORGANIZED HEALTH CARE EDUCATION/TRAINING PROGRAM

## 2024-03-21 PROCEDURE — 97535 SELF CARE MNGMENT TRAINING: CPT

## 2024-03-21 PROCEDURE — 85025 COMPLETE CBC W/AUTO DIFF WBC: CPT | Performed by: STUDENT IN AN ORGANIZED HEALTH CARE EDUCATION/TRAINING PROGRAM

## 2024-03-21 PROCEDURE — 20600001 HC STEP DOWN PRIVATE ROOM

## 2024-03-21 PROCEDURE — P9021 RED BLOOD CELLS UNIT: HCPCS | Performed by: HOSPITALIST

## 2024-03-21 PROCEDURE — 63600175 PHARM REV CODE 636 W HCPCS

## 2024-03-21 PROCEDURE — 25000003 PHARM REV CODE 250

## 2024-03-21 PROCEDURE — 83735 ASSAY OF MAGNESIUM: CPT | Performed by: STUDENT IN AN ORGANIZED HEALTH CARE EDUCATION/TRAINING PROGRAM

## 2024-03-21 PROCEDURE — 84100 ASSAY OF PHOSPHORUS: CPT | Performed by: STUDENT IN AN ORGANIZED HEALTH CARE EDUCATION/TRAINING PROGRAM

## 2024-03-21 PROCEDURE — 92526 ORAL FUNCTION THERAPY: CPT

## 2024-03-21 PROCEDURE — 36415 COLL VENOUS BLD VENIPUNCTURE: CPT | Performed by: STUDENT IN AN ORGANIZED HEALTH CARE EDUCATION/TRAINING PROGRAM

## 2024-03-21 PROCEDURE — 27000207 HC ISOLATION

## 2024-03-21 PROCEDURE — 30233N1 TRANSFUSION OF NONAUTOLOGOUS RED BLOOD CELLS INTO PERIPHERAL VEIN, PERCUTANEOUS APPROACH: ICD-10-PCS | Performed by: INTERNAL MEDICINE

## 2024-03-21 PROCEDURE — 86920 COMPATIBILITY TEST SPIN: CPT | Performed by: HOSPITALIST

## 2024-03-21 PROCEDURE — 80053 COMPREHEN METABOLIC PANEL: CPT | Performed by: STUDENT IN AN ORGANIZED HEALTH CARE EDUCATION/TRAINING PROGRAM

## 2024-03-21 RX ORDER — HYDROCODONE BITARTRATE AND ACETAMINOPHEN 500; 5 MG/1; MG/1
TABLET ORAL
Status: DISCONTINUED | OUTPATIENT
Start: 2024-03-21 | End: 2024-03-27 | Stop reason: HOSPADM

## 2024-03-21 RX ADMIN — VANCOMYCIN HYDROCHLORIDE 125 MG: KIT at 05:03

## 2024-03-21 RX ADMIN — VANCOMYCIN HYDROCHLORIDE 125 MG: KIT at 12:03

## 2024-03-21 RX ADMIN — ENOXAPARIN SODIUM 40 MG: 40 INJECTION SUBCUTANEOUS at 05:03

## 2024-03-21 RX ADMIN — CHLORHEXIDINE GLUCONATE 0.12% ORAL RINSE 15 ML: 1.2 LIQUID ORAL at 09:03

## 2024-03-21 RX ADMIN — SILODOSIN 4 MG: 4 CAPSULE ORAL at 08:03

## 2024-03-21 RX ADMIN — MICONAZOLE NITRATE 2 % TOPICAL POWDER: at 09:03

## 2024-03-21 RX ADMIN — MICONAZOLE NITRATE 2 % TOPICAL POWDER: at 08:03

## 2024-03-21 RX ADMIN — VANCOMYCIN HYDROCHLORIDE 125 MG: KIT at 11:03

## 2024-03-21 RX ADMIN — CHLORHEXIDINE GLUCONATE 0.12% ORAL RINSE 15 ML: 1.2 LIQUID ORAL at 08:03

## 2024-03-21 RX ADMIN — ATORVASTATIN CALCIUM 40 MG: 40 TABLET, FILM COATED ORAL at 08:03

## 2024-03-21 NOTE — PLAN OF CARE
Pt aao x 4. VSS. Bed in low locked pos, call light within reach. Condom cath in place. Po vanc cont as ordered. AM labs ordered.

## 2024-03-21 NOTE — SUBJECTIVE & OBJECTIVE
Interval History:   3/21: Poor appetite. Reports some abdominal discomfort. Less diarrhea. No evidence of active bleeding. PRBC x 1 ordered    Review of Systems   Gastrointestinal:  Positive for abdominal pain. Negative for nausea.   Musculoskeletal:  Negative for back pain.   Psychiatric/Behavioral:  Negative for confusion.      Objective:     Vital Signs (Most Recent):  Temp: 98.3 °F (36.8 °C) (03/21/24 1230)  Pulse: 99 (03/21/24 1230)  Resp: 18 (03/21/24 1230)  BP: 116/68 (03/21/24 1230)  SpO2: 96 % (03/21/24 1230) Vital Signs (24h Range):  Temp:  [98.3 °F (36.8 °C)-100.1 °F (37.8 °C)] 98.3 °F (36.8 °C)  Pulse:  [] 99  Resp:  [18] 18  SpO2:  [95 %-97 %] 96 %  BP: (108-124)/(58-72) 116/68     Weight: 114 kg (251 lb 4.5 oz)  Body mass index is 37.11 kg/m².    Intake/Output Summary (Last 24 hours) at 3/21/2024 1552  Last data filed at 3/21/2024 0515  Gross per 24 hour   Intake 0 ml   Output 550 ml   Net -550 ml         Physical Exam  Vitals and nursing note reviewed.   Constitutional:       General: He is not in acute distress.     Appearance: He is not ill-appearing.   HENT:      Head: Normocephalic.   Eyes:      General: No scleral icterus.     Extraocular Movements: Extraocular movements intact.   Cardiovascular:      Rate and Rhythm: Regular rhythm. Tachycardia present.      Heart sounds: Normal heart sounds. No murmur heard.  Pulmonary:      Effort: No respiratory distress.      Breath sounds: Normal breath sounds. No wheezing.   Abdominal:      General: There is no distension.      Palpations: Abdomen is soft.      Tenderness: There is no abdominal tenderness.   Musculoskeletal:         General: No tenderness.      Right lower leg: No edema.      Left lower leg: No edema.      Comments: BUE contractures   Skin:     General: Skin is warm and dry.   Neurological:      Mental Status: He is alert and oriented to person, place, and time. Mental status is at baseline.   Psychiatric:         Mood and Affect:  Mood normal.         Behavior: Behavior normal.             Significant Labs: All pertinent labs within the past 24 hours have been reviewed.    Significant Imaging: I have reviewed all pertinent imaging results/findings within the past 24 hours.

## 2024-03-21 NOTE — PROGRESS NOTES
[facility] Medicine  Progress Note    Patient Name: Bj Pate Jr.  MRN: [id number]  Patient Class: IP- Inpatient   Admission Date: 3/4/2024  Length of Stay: 17 days  Attending Physician: Savanah Walker MD  Primary Care Provider: Jose Antonio Foster MD        Subjective:     Principal Problem:Acute encephalopathy        HPI:  73M with PMH CVA with R sided deficits, DM, HTN who presents to the ED from SNF for AMS. Per chart, he was more somnolent than usual over the last 2 days. At baseline he is verbal and follows commands but does not ambulate. On arrival to ED he was hypothermic to 87.9F and found to be hypoglycemic to the 50s. Intermittently hypotensive to MAP 55. Patient is awake but does not interact meaningfully on exam. Chronic Rivera was initially draining cloudy urine with sediment, Rivera exchanged. Initial labs without leukocytosis, CMP unremarkable, VBG with pH 7.49/30.3/38.9, lactate normal. Received 1L NS bolus through warming pump. Initially admitted to , critical care consulted for severe hypothermia with hypotension.    Overview/Hospital Course:  Admitted to the ED from SNF with acute encephalopathy. Initially admitted to hospital medicine however critical care consulted for severe hypothermia and hypotension.  Intubated 3/4 for airway protection, eventually extubated 3/10.  Workup notable for Citrobacter farmeri UTI, antibiotics tailored accordingly.  Neurology has been on board due to ongoing encephalopathy.  Recommending continued treatment of active medical issues.  Admission further complicated by heart block, likely Wenckebach for which EP was consulted. Likely indicative of periods of increased vagal tone or disordered sleep breathing, no evidnece of high degree block recorded, no indication for PPM. In addition, has been relatively intolerant of p.o. intake, NG tube has been placed with tube feeds started. Failed MBSS. Neurology following, deferred LP for  repeat spinal imaging. Quality limited by motion artifact. Patient's mental status near baseline by 3/15. Cleared for PO by SLP on 3/18. NG tube to be removed. Patient with unclear history of urinary retention and chronic bryant. Bryant catheter removed AM 3/18, passed voiding trial. Diarrhea reported by nursing 3/15, c diff positive, started on oral vanc. Last episode of diarrhea documented on 3/16. Per patient's SNF, patient must be off contact precautions before returning. Patient to remain inpatient until completion of treatment for c diff on 3/26 when contact precautions can be discontinued as per hospital protocol.     Interval History:   3/21: Poor appetite. Reports some abdominal discomfort. Less diarrhea. No evidence of active bleeding. PRBC x 1 ordered    Review of Systems   Gastrointestinal:  Positive for abdominal pain. Negative for nausea.   Musculoskeletal:  Negative for back pain.   Psychiatric/Behavioral:  Negative for confusion.      Objective:     Vital Signs (Most Recent):  Temp: 98.3 °F (36.8 °C) (03/21/24 1230)  Pulse: 99 (03/21/24 1230)  Resp: 18 (03/21/24 1230)  BP: 116/68 (03/21/24 1230)  SpO2: 96 % (03/21/24 1230) Vital Signs (24h Range):  Temp:  [98.3 °F (36.8 °C)-100.1 °F (37.8 °C)] 98.3 °F (36.8 °C)  Pulse:  [] 99  Resp:  [18] 18  SpO2:  [95 %-97 %] 96 %  BP: (108-124)/(58-72) 116/68     Weight: 114 kg (251 lb 4.5 oz)  Body mass index is 37.11 kg/m².    Intake/Output Summary (Last 24 hours) at 3/21/2024 1552  Last data filed at 3/21/2024 0515  Gross per 24 hour   Intake 0 ml   Output 550 ml   Net -550 ml         Physical Exam  Vitals and nursing note reviewed.   Constitutional:       General: He is not in acute distress.     Appearance: He is not ill-appearing.   HENT:      Head: Normocephalic.   Eyes:      General: No scleral icterus.     Extraocular Movements: Extraocular movements intact.   Cardiovascular:      Rate and Rhythm: Regular rhythm. Tachycardia present.      Heart  sounds: Normal heart sounds. No murmur heard.  Pulmonary:      Effort: No respiratory distress.      Breath sounds: Normal breath sounds. No wheezing.   Abdominal:      General: There is no distension.      Palpations: Abdomen is soft.      Tenderness: There is no abdominal tenderness.   Musculoskeletal:         General: No tenderness.      Right lower leg: No edema.      Left lower leg: No edema.      Comments: BUE contractures   Skin:     General: Skin is warm and dry.   Neurological:      Mental Status: He is alert and oriented to person, place, and time. Mental status is at baseline.   Psychiatric:         Mood and Affect: Mood normal.         Behavior: Behavior normal.             Significant Labs: All pertinent labs within the past 24 hours have been reviewed.    Significant Imaging: I have reviewed all pertinent imaging results/findings within the past 24 hours.    Assessment/Plan:      * Acute encephalopathy  Resolved  Presented from NH with altered mentation, initially intubated for airway protection 3/4, extubated 3/10. Likely acute on chronic in the setting of history of CVA with likely low cognitive reserve at baseline, and exacerbated by UTI. Varying accounts of baseline makes evaluation difficult. Per daughter: Has not been functionally independent since November (before December admission), currently lives in assisted living and requires assstance with ADLs. Mobility significantly impaird. AOx3 4-5 days out of the week, at other times will be confused and disoriented. 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 CTH on admission unremarkable for acute process. MRI spine obtained on 3/10 showing chronic dicitis  - Status post treatment for UTI  - Neurology on board, expect extended return to baseline given significant infection in the setting of fairly low cognitive reserve, continue treatment of active medical issues  -Contacted neuro 3/13 to  "discuss LP/further workup since patient not near baseline   Per radiology, patient's anatomy should not limit bedside LP attempt.    Neurology to attempt bedside LP 3/15 > deferred for repeat imaging   Repeat imaging limited by motion artifact, neurology deferring further intervention as mental status is improved 3/16  - NG tube to be removed        Chronic indwelling Bryant catheter  REMOVED  Patient admitted to hospital with bryant. Unclear if it was in place for retention or due to history of sacral wound. Patient has not been evaluated by urology.   - passed voiding trial 3/18  - outpatient follow-up with urology     Diarrhea  Resolved  Significant diarrhea reported by nursing AM 3/15. No bowel regimen for over 48 hours and recent abx use. Concern for c diff vs tube feeds > c diff positive  - oral vanc started 3/16       Dysphagia  Dysphagia in the setting of encephalopathy. Required NG tube for prolonged period of time before mental status returned to baseline. Now back on diet as per SLP  - SLP following  - nutrition following    Second degree AV block, Mobitz type I  Stable  Concern for CHB overnight 3/6  -EP: Likely indicative of periods of increased vagal tone or disordered sleep breathing, no evidence of high-degree block noted therefore no indication for PPM/transcutaneous pacing        Hypoglycemia  - no further episodes      Sepsis  Resolved  This patient does have evidence of infective focus  My overall impression is sepsis.  Source: Urinary Tract  Antibiotics given-   Antibiotics (72h ago, onward)      Start     Stop Route Frequency Ordered    03/19/24 1800  vancomycin 125 mg/5 mL oral solution 125 mg  (C. difficile Infection (CDI) Treatment Order Panel)         03/26/24 1159 Oral Every 6 hours 03/19/24 1422          Latest lactate reviewed-  No results for input(s): "LACTATE", "POCLAC" in the last 72 hours.  Organ dysfunction indicated by Encephalopathy    Fluid challenge Not needed - patient is not " "hypotensive      Post- resuscitation assessment No - Post resuscitation assessment not needed       Will Not start Pressors- Levophed for MAP of 65  Source control achieved by: antibiotics    Hypothermia  Resolved      Hemiparesis  - history noted  - PT/OT      Anemia  Patient's anemia is currently controlled. Has not received any PRBCs to date. Etiology likely d/t  chronic disease vs KIMMY, iatrogenic  Current CBC reviewed-   Lab Results   Component Value Date    HGB 6.5 (L) 03/21/2024    HCT 20.2 (L) 03/21/2024     Monitor serial CBC and transfuse if patient becomes hemodynamically unstable, symptomatic or H/H drops below 7/21.    3/21: No signs of active bleeding but Hg < 7 today, blood consent completed and 1 U PRBC ordered.     Clostridium difficile infection  Significant diarrhea reported by nursing AM 3/15. No bowel regimen for over 48 hours and recent abx use. Concern for c diff vs tube feeds > c diff positive.  Last episode of diarrhea 3/16.  - oral vanc started 3/16   - EOT 3/26  - cannot return to SNF on contact precautions  - contact precautions cannot be discontinued until treatment is completed    Hyponatremia  Patient has hyponatremia which is controlled,We will aim to correct the sodium by 4-6mEq in 24 hours. We will monitor sodium Daily. The hyponatremia is due to Dehydration/hypovolemia vs excessive free water flushes. The patient's sodium results have been reviewed and are listed below.  No results for input(s): "NA" in the last 24 hours.      Urinary tract infection without hematuria  now s/p 7 days zosyn  > passed voiding trial 3/18      History of CVA (cerebrovascular accident)  History of R sided hemiparesis from prior CVA        VTE Risk Mitigation (From admission, onward)           Ordered     enoxaparin injection 40 mg  Every 24 hours         03/12/24 1008     IP VTE HIGH RISK PATIENT  Once         03/04/24 0316     Place sequential compression device  Until discontinued         03/04/24 0316 "                    Discharge Planning   GLORIA: 3/25/2024     Code Status: Full Code   Is the patient medically ready for discharge?: No    Reason for patient still in hospital (select all that apply): Patient trending condition  Discharge Plan A: Return to nursing home   Discharge Delays: None known at this time              Savanah Walker MD  Department of Hospital Medicine   [facility] - Transplant Stepdown

## 2024-03-21 NOTE — PLAN OF CARE
AAOX3  VVS  TURN Q 2  CONDOM CATH IN PLACE  1 UNIT PRBC GIVEN   MEDS GIVEN WITH PUDDIN   SAFETY IN PLACE

## 2024-03-21 NOTE — ASSESSMENT & PLAN NOTE
Patient's anemia is currently controlled. Has not received any PRBCs to date. Etiology likely d/t  chronic disease vs KIMMY, iatrogenic  Current CBC reviewed-   Lab Results   Component Value Date    HGB 6.5 (L) 03/21/2024    HCT 20.2 (L) 03/21/2024     Monitor serial CBC and transfuse if patient becomes hemodynamically unstable, symptomatic or H/H drops below 7/21.    3/21: No signs of active bleeding but Hg < 7 today, blood consent completed and 1 U PRBC ordered.

## 2024-03-21 NOTE — PT/OT/SLP PROGRESS
"Speech Language Pathology Treatment    Patient Name:  Bj Pate Jr.   MRN:  [id number]  Admitting Diagnosis: Acute encephalopathy    Recommendations:                 General Recommendations:  Dysphagia therapy and ongoing f/u with Registered Dietician   Diet recommendations:  Minced & Moist Diet - IDDSI Level 5, Liquid Diet Level: Thin liquids - IDDSI Level 0   Aspiration Precautions:  only when awake/alert/attentive and upright, single bites/sips, 1 bite/sip at a time, Assistance with meals, Check for pocketing/oral residue, Eliminate distractions, Feed only when awake/alert, Frequent oral care, Meds crushed in puree, and Monitor for s/s of aspiration. Continue to monitor for signs and symptoms of aspiration and discontinue oral feeding should you notice any of the following: watery eyes, reddened facial area, wet vocal quality, increased work of breathing, change in respiratory status, increased congestion, coughing, fever and/or change in level of alertness   General Precautions: Standard, aspiration, fall  Communication strategies:  go to room if call light pushed    Assessment:     Bj Pate Jr. is a 74 y.o. male with an SLP diagnosis of Dysphagia and Dysphonia.  He presents with minimal appetite.  Pt with moderate lingual stasis with trials of advanced textures with ST today. Strict aspiration precautions and frequent oral care advised for safety. Concerns remain for adequate nutrition/hydration due to minimal PO acceptance.     Subjective     SLP reviewed Pt with RN, RN explained Pt declined breakfast meal tray items, requested cereal (frosted flakes.)   Pt presents alert  He explains, "my throat is clear"     Pain/Comfort:  Pain Rating 1: 6/10  Location - Orientation 1: generalized  Location 1: back  Pain Addressed 1: Nurse notified, Reposition, Distraction, Cessation of Activity  Pain Rating Post-Intervention 1: 5/10    Respiratory Status: Room air    Objective:     Has the patient been evaluated by " SLP for swallowing?   Yes  Keep patient NPO? No   Current Respiratory Status:   Room air     Pt found reclined in bed. SLP noted untouched breakfast meal tray on counter. Pt endorsed minimal appetite. His voice was breathy with low intensity. He requested cereal. Pt seen with trials of semi-soft textures (corn flakes with milk x4) fed by clinician.  He declined additional PO trials. No overt S/S aspiration with trials of semi-soft solids. Pt with moderately prolonged mastication and lingual stasis with bites of semi soft solids. Pt cued to use subsequent swallows and provided liquid wash to clear residuals. Additional residuals cleared with use of moistened toothette. He was educated on diet modifications, safety precautions, S/S aspiration, aspiration precautions and ongoing SLP POC. Pt verbalized understanding. He endorsed minimal appetite.  PCT in room to reposition Pt at end of session. RN notified of findings upon SLP exit.     Goals:   Multidisciplinary Problems       SLP Goals          Problem: SLP    Goal Priority Disciplines Outcome   SLP Goal     SLP Ongoing, Progressing   Description: Speech Language Pathology Goals  Goals expected to be met by 3/28  1. Pt will participate in Modified Barium Swallow Study to r/o aspiration and determine safest PO diet. -completed   2. Pt will tolerate Level V minced and moist textures without overt S/S aspiration, MIN A  3. Pt will tolerate trials of advanced textures with adequate oral clearance and no overt S/S aspiration, MIN A                               Plan:     Patient to be seen:  3 x/week   Plan of Care expires:  04/12/24  Plan of Care reviewed with:  patient   SLP Follow-Up:  Yes       Discharge recommendations:  Moderate Intensity Therapy       Time Tracking:     SLP Treatment Date:   03/21/24  Speech Start Time:  0946  Speech Stop Time:  1020     Speech Total Time (min):  34 min    Billable Minutes: Treatment Swallowing Dysfunction 15 and Self Care/Home  Management Training 15    03/21/2024

## 2024-03-21 NOTE — PT/OT/SLP PROGRESS
Physical Therapy  Continue Current Plan of Care     Patient Name:  Bj Pate Jr.   MRN:  [id number]  Admitting Diagnosis:  Acute encephalopathy   Recent Surgery: * No surgery found *    Admit Date: 3/4/2024  Length of Stay: 17 days    Patient not seen on this date, however per chart review pt continues to benefit from acute PT services. PT to follow up as POC allows. Please continue progressive mobility as appropriate.    Will follow up:3/22/24

## 2024-03-21 NOTE — PLAN OF CARE
Problem: SLP  Goal: SLP Goal  Description: Speech Language Pathology Goals  Goals expected to be met by 3/28  1. Pt will participate in Modified Barium Swallow Study to r/o aspiration and determine safest PO diet. -completed   2. Pt will tolerate Level V minced and moist textures without overt S/S aspiration, MIN A  3. Pt will tolerate trials of advanced textures with adequate oral clearance and no overt S/S aspiration, MIN A          Outcome: Ongoing, Progressing     Pt seen for ongoing dysphagia therapy. Pt with minimal appetite. Pt requires strict 1:1 assistance with all PO for safety. Findings reviewed with AMELIA MOONEY to continue to follow.    3/21/2024

## 2024-03-21 NOTE — PLAN OF CARE
9:06 AM  The SW contacted Oswaldo with [facility] to follow-up regarding the patient's plans for Skilled Placement. The SW informed Oswaldo that a new C. Difficile test has been ordered on this patient's behalf.  Oswaldo informed the SW to contact him once the results are in.       Lily Castillo LMSW  Case Management [facility]

## 2024-03-22 LAB
ANISOCYTOSIS BLD QL SMEAR: SLIGHT
BASOPHILS # BLD AUTO: 0.03 K/UL (ref 0–0.2)
BASOPHILS NFR BLD: 0.1 % (ref 0–1.9)
DIFFERENTIAL METHOD BLD: ABNORMAL
EOSINOPHIL # BLD AUTO: 0 K/UL (ref 0–0.5)
EOSINOPHIL NFR BLD: 0 % (ref 0–8)
ERYTHROCYTE [DISTWIDTH] IN BLOOD BY AUTOMATED COUNT: 16.8 % (ref 11.5–14.5)
HCT VFR BLD AUTO: 23 % (ref 40–54)
HGB BLD-MCNC: 7.5 G/DL (ref 14–18)
HYPOCHROMIA BLD QL SMEAR: ABNORMAL
IMM GRANULOCYTES # BLD AUTO: 0.67 K/UL (ref 0–0.04)
IMM GRANULOCYTES NFR BLD AUTO: 2.9 % (ref 0–0.5)
LYMPHOCYTES # BLD AUTO: 0.6 K/UL (ref 1–4.8)
LYMPHOCYTES NFR BLD: 2.7 % (ref 18–48)
MCH RBC QN AUTO: 27.2 PG (ref 27–31)
MCHC RBC AUTO-ENTMCNC: 32.6 G/DL (ref 32–36)
MCV RBC AUTO: 83 FL (ref 82–98)
MONOCYTES # BLD AUTO: 2.5 K/UL (ref 0.3–1)
MONOCYTES NFR BLD: 10.7 % (ref 4–15)
NEUTROPHILS # BLD AUTO: 19.4 K/UL (ref 1.8–7.7)
NEUTROPHILS NFR BLD: 83.6 % (ref 38–73)
NRBC BLD-RTO: 0 /100 WBC
OHS QRS DURATION: 66 MS
OHS QTC CALCULATION: 433 MS
OVALOCYTES BLD QL SMEAR: ABNORMAL
PLATELET # BLD AUTO: 553 K/UL (ref 150–450)
PLATELET BLD QL SMEAR: ABNORMAL
PMV BLD AUTO: 9.6 FL (ref 9.2–12.9)
POIKILOCYTOSIS BLD QL SMEAR: SLIGHT
POLYCHROMASIA BLD QL SMEAR: ABNORMAL
RBC # BLD AUTO: 2.76 M/UL (ref 4.6–6.2)
TARGETS BLD QL SMEAR: ABNORMAL
WBC # BLD AUTO: 23.18 K/UL (ref 3.9–12.7)

## 2024-03-22 PROCEDURE — 36415 COLL VENOUS BLD VENIPUNCTURE: CPT | Performed by: HOSPITALIST

## 2024-03-22 PROCEDURE — 63600175 PHARM REV CODE 636 W HCPCS: Performed by: INTERNAL MEDICINE

## 2024-03-22 PROCEDURE — 20600001 HC STEP DOWN PRIVATE ROOM

## 2024-03-22 PROCEDURE — 85025 COMPLETE CBC W/AUTO DIFF WBC: CPT | Performed by: HOSPITALIST

## 2024-03-22 PROCEDURE — 97110 THERAPEUTIC EXERCISES: CPT | Mod: CQ

## 2024-03-22 PROCEDURE — 25000003 PHARM REV CODE 250: Performed by: STUDENT IN AN ORGANIZED HEALTH CARE EDUCATION/TRAINING PROGRAM

## 2024-03-22 PROCEDURE — 63600175 PHARM REV CODE 636 W HCPCS

## 2024-03-22 PROCEDURE — 25000003 PHARM REV CODE 250

## 2024-03-22 PROCEDURE — 97110 THERAPEUTIC EXERCISES: CPT | Mod: CO

## 2024-03-22 PROCEDURE — 97535 SELF CARE MNGMENT TRAINING: CPT

## 2024-03-22 PROCEDURE — 63600175 PHARM REV CODE 636 W HCPCS: Performed by: HOSPITALIST

## 2024-03-22 PROCEDURE — 93005 ELECTROCARDIOGRAM TRACING: CPT

## 2024-03-22 PROCEDURE — 27000207 HC ISOLATION

## 2024-03-22 RX ORDER — SODIUM CHLORIDE, SODIUM LACTATE, POTASSIUM CHLORIDE, CALCIUM CHLORIDE 600; 310; 30; 20 MG/100ML; MG/100ML; MG/100ML; MG/100ML
INJECTION, SOLUTION INTRAVENOUS CONTINUOUS
Status: ACTIVE | OUTPATIENT
Start: 2024-03-22 | End: 2024-03-23

## 2024-03-22 RX ADMIN — ENOXAPARIN SODIUM 40 MG: 40 INJECTION SUBCUTANEOUS at 04:03

## 2024-03-22 RX ADMIN — VANCOMYCIN HYDROCHLORIDE 125 MG: KIT at 11:03

## 2024-03-22 RX ADMIN — ATORVASTATIN CALCIUM 40 MG: 40 TABLET, FILM COATED ORAL at 09:03

## 2024-03-22 RX ADMIN — VANCOMYCIN HYDROCHLORIDE 125 MG: KIT at 05:03

## 2024-03-22 RX ADMIN — VANCOMYCIN HYDROCHLORIDE 125 MG: KIT at 12:03

## 2024-03-22 RX ADMIN — MICONAZOLE NITRATE 2 % TOPICAL POWDER: at 09:03

## 2024-03-22 RX ADMIN — CHLORHEXIDINE GLUCONATE 0.12% ORAL RINSE 15 ML: 1.2 LIQUID ORAL at 09:03

## 2024-03-22 RX ADMIN — SODIUM CHLORIDE, POTASSIUM CHLORIDE, SODIUM LACTATE AND CALCIUM CHLORIDE 500 ML: 600; 310; 30; 20 INJECTION, SOLUTION INTRAVENOUS at 05:03

## 2024-03-22 RX ADMIN — SILODOSIN 4 MG: 4 CAPSULE ORAL at 09:03

## 2024-03-22 RX ADMIN — SODIUM CHLORIDE, POTASSIUM CHLORIDE, SODIUM LACTATE AND CALCIUM CHLORIDE: 600; 310; 30; 20 INJECTION, SOLUTION INTRAVENOUS at 01:03

## 2024-03-22 RX ADMIN — SODIUM CHLORIDE, POTASSIUM CHLORIDE, SODIUM LACTATE AND CALCIUM CHLORIDE: 600; 310; 30; 20 INJECTION, SOLUTION INTRAVENOUS at 09:03

## 2024-03-22 NOTE — PLAN OF CARE
Problem: Infection  Goal: Absence of Infection Signs and Symptoms  Outcome: Ongoing, Progressing     Problem: Adult Inpatient Plan of Care  Goal: Plan of Care Review  Outcome: Ongoing, Progressing  Goal: Patient-Specific Goal (Individualized)  Outcome: Ongoing, Progressing  Goal: Absence of Hospital-Acquired Illness or Injury  Outcome: Ongoing, Progressing    Patient placed back on tele due to elevated heart rate. All other vital signs stable. On call provider notified and a bolus of  ml was ordered.

## 2024-03-22 NOTE — SUBJECTIVE & OBJECTIVE
Interval History:   3/22: EKG reviewed with sinus tachycardia. Started maintenance fluids as patient reports poor appetite. Hg has improved with blood transfusion.       Review of Systems   Gastrointestinal:  Positive for abdominal pain. Negative for nausea.   Musculoskeletal:  Negative for back pain.   Psychiatric/Behavioral:  Negative for confusion.      Objective:     Vital Signs (Most Recent):  Temp: 98 °F (36.7 °C) (03/22/24 0330)  Pulse: (!) 129 (03/22/24 0424)  Resp: 18 (03/22/24 0330)  BP: (!) 144/56 (03/22/24 0330)  SpO2: (!) 94 % (03/22/24 0330) Vital Signs (24h Range):  Temp:  [98 °F (36.7 °C)-99.6 °F (37.6 °C)] 98 °F (36.7 °C)  Pulse:  [] 129  Resp:  [17-18] 18  SpO2:  [94 %-96 %] 94 %  BP: (114-144)/(56-73) 144/56     Weight: 114 kg (251 lb 4.5 oz)  Body mass index is 37.11 kg/m².    Intake/Output Summary (Last 24 hours) at 3/22/2024 1011  Last data filed at 3/21/2024 2335  Gross per 24 hour   Intake 307.5 ml   Output --   Net 307.5 ml         Physical Exam  Vitals and nursing note reviewed.   Constitutional:       General: He is not in acute distress.     Appearance: He is not ill-appearing.   HENT:      Head: Normocephalic.   Eyes:      General: No scleral icterus.     Extraocular Movements: Extraocular movements intact.   Cardiovascular:      Rate and Rhythm: Regular rhythm. Tachycardia present.      Heart sounds: Normal heart sounds. No murmur heard.  Pulmonary:      Effort: No respiratory distress.      Breath sounds: Normal breath sounds. No wheezing.   Abdominal:      General: There is no distension.      Palpations: Abdomen is soft.      Tenderness: There is no abdominal tenderness.   Musculoskeletal:         General: No tenderness.      Right lower leg: No edema.      Left lower leg: No edema.      Comments: BUE contractures   Skin:     General: Skin is warm and dry.   Neurological:      Mental Status: He is alert and oriented to person, place, and time. Mental status is at baseline.    Psychiatric:         Mood and Affect: Mood normal.         Behavior: Behavior normal.             Significant Labs: All pertinent labs within the past 24 hours have been reviewed.    Significant Imaging: I have reviewed all pertinent imaging results/findings within the past 24 hours.

## 2024-03-22 NOTE — PT/OT/SLP PROGRESS
Occupational Therapy   Treatment    Name: Bj Pate Jr.  MRN: [id number]  Admitting Diagnosis:  Acute encephalopathy       Recommendations:     Discharge Recommendations: Moderate Intensity Therapy  Discharge Equipment Recommendations:  to be determined by next level of care  Barriers to discharge:       Assessment:     Bj Pate Jr. is a 74 y.o. male with a medical diagnosis of Acute encephalopathy.  He presents with the following performance deficits affecting function: weakness, impaired endurance, impaired self care skills, impaired functional mobility, gait instability, impaired balance, pain, edema, decreased lower extremity function, decreased upper extremity function, impaired fine motor, impaired coordination, decreased ROM, abnormal tone, impaired muscle length.     Pt agreeable to therapy and tolerated session well. Pt requiring significant assistance for ROM. Pt performing PROM and passive stretches while supine in bed to improve function and ROM. Light pain noted with movement, especially in wrist (Left more than right)    Rehab Prognosis:  Fair; patient would benefit from acute skilled OT services to address these deficits and reach maximum level of function.       Plan:     Patient to be seen 4 x/week to address the above listed problems via self-care/home management, therapeutic activities, therapeutic exercises, neuromuscular re-education  Plan of Care Expires: 04/12/24  Plan of Care Reviewed with: patient    Subjective     Chief Complaint: pain with movement  Patient/Family Comments/goals: to improve function  Pain/Comfort:  Pain Rating 1: other (see comments) (unrated)  Location - Side 1: Left  Location - Orientation 1: generalized  Location 1: wrist  Pain Addressed 1: Reposition, Distraction  Pain Rating Post-Intervention 1: other (see comments) (unrated)    Objective:     Communicated with: RN prior to session.  Patient found HOB elevated with pulse ox (continuous), Condom Catheter,  telemetry upon OT entry to room.    General Precautions: Standard, fall, aspiration    Orthopedic Precautions:N/A  Braces: N/A  Respiratory Status: Room air     Occupational Performance:     Bed Mobility:    Not performed     Activities of Daily Living:  Grooming: maximal assistance for facial care laying supine with HOB elevated      AMPA 6 Click ADL: 6    Treatment & Education:  Pt educated on OT POC and frequency during hospital stay.   HOB elevated, pt completed BUE therex PROM Level (r05twad each)  - Straight Arm Raises  - Bicep Curls  - wrist and finger flexion/extension  Pt performing passive UE stretching to improve ROM.  Addressed all patient questions/concerns within HOUSE scope of practice.     Patient left HOB elevated with all lines intact, call button in reach, RN notified, and family present    GOALS:   Multidisciplinary Problems       Occupational Therapy Goals          Problem: Occupational Therapy    Goal Priority Disciplines Outcome Interventions   Occupational Therapy Goal     OT, PT/OT Ongoing, Progressing    Description: Goals to be met by: 4/2/2024     Patient will increase functional independence with ADLs by performing:    UE Dressing with Moderate Assistance.  Grooming while seated with Minimal Assistance.   Sitting at edge of bed x8 minutes with Contact Guard Assistance.  Supine to sit with Moderate Assistance.  Sit<>stand transfers with Minimal Assistance.  Upper extremity exercise program 2x10 reps, with supervision.                         Time Tracking:     OT Date of Treatment: 03/22/24  OT Start Time: 1126  OT Stop Time: 1152  OT Total Time (min): 26 min    Billable Minutes:Therapeutic Exercise 26    OT/АЛЕКСАНДР: АЛЕКСАНРД     Number of АЛЕКСАНДР visits since last OT visit: 3    3/22/2024

## 2024-03-22 NOTE — TELEPHONE ENCOUNTER
----- Message from Cinthya Gimenez sent at 11/29/2023 10:16 AM CST -----  .Type: RX Refill Request    Who Called: Self     Have you contacted your pharmacy: No     Refill or New Rx:Refill     RX Name and Strength:furosemide (LASIX) 20 MG tablet    Preferred Pharmacy with phone number:.  [id number] - [place] [address] [facility] [place] [address] [address]  [facility] [address]  Phone: [phone] Fax: [phone]        Local or Mail Order: Local     Ordering Provider: Michael     Would the patient rather a call back or a response via My [facility]? Call Back     Best Call Back Number: .[phone] (home)        Additional Information:         Female

## 2024-03-22 NOTE — PROGRESS NOTES
[facility] - Transplant [facility] Medicine  Progress Note    Patient Name: Bj Pate Jr.  MRN: [id number]  Patient Class: IP- Inpatient   Admission Date: 3/4/2024  Length of Stay: 18 days  Attending Physician: Savanah Walker MD  Primary Care Provider: Jose Antonio Foster MD        Subjective:     Principal Problem:Acute encephalopathy        HPI:  73M with PMH CVA with R sided deficits, DM, HTN who presents to the ED from SNF for AMS. Per chart, he was more somnolent than usual over the last 2 days. At baseline he is verbal and follows commands but does not ambulate. On arrival to ED he was hypothermic to 87.9F and found to be hypoglycemic to the 50s. Intermittently hypotensive to MAP 55. Patient is awake but does not interact meaningfully on exam. Chronic Rivera was initially draining cloudy urine with sediment, Rivera exchanged. Initial labs without leukocytosis, CMP unremarkable, VBG with pH 7.49/30.3/38.9, lactate normal. Received 1L NS bolus through warming pump. Initially admitted to , critical care consulted for severe hypothermia with hypotension.    Overview/Hospital Course:  Admitted to the ED from SNF with acute encephalopathy. Initially admitted to hospital medicine however critical care consulted for severe hypothermia and hypotension.  Intubated 3/4 for airway protection, eventually extubated 3/10.  Workup notable for Citrobacter farmeri UTI, antibiotics tailored accordingly.  Neurology has been on board due to ongoing encephalopathy.  Recommending continued treatment of active medical issues.  Admission further complicated by heart block, likely Wenckebach for which EP was consulted. Likely indicative of periods of increased vagal tone or disordered sleep breathing, no evidnece of high degree block recorded, no indication for PPM. In addition, has been relatively intolerant of p.o. intake, NG tube has been placed with tube feeds started. Failed MBSS. Neurology following, deferred LP for  repeat spinal imaging. Quality limited by motion artifact. Patient's mental status near baseline by 3/15. Cleared for PO by SLP on 3/18. NG tube to be removed. Patient with unclear history of urinary retention and chronic bryant. Bryant catheter removed AM 3/18, passed voiding trial. Diarrhea reported by nursing 3/15, c diff positive, started on oral vanc. Last episode of diarrhea documented on 3/16. Per patient's SNF, patient must be off contact precautions before returning. Patient to remain inpatient until completion of treatment for c diff on 3/26 when contact precautions can be discontinued as per hospital protocol.     Interval History:   3/22: EKG reviewed with sinus tachycardia. Started maintenance fluids as patient reports poor appetite. Hg has improved with blood transfusion.       Review of Systems   Gastrointestinal:  Positive for abdominal pain. Negative for nausea.   Musculoskeletal:  Negative for back pain.   Psychiatric/Behavioral:  Negative for confusion.      Objective:     Vital Signs (Most Recent):  Temp: 98 °F (36.7 °C) (03/22/24 0330)  Pulse: (!) 129 (03/22/24 0424)  Resp: 18 (03/22/24 0330)  BP: (!) 144/56 (03/22/24 0330)  SpO2: (!) 94 % (03/22/24 0330) Vital Signs (24h Range):  Temp:  [98 °F (36.7 °C)-99.6 °F (37.6 °C)] 98 °F (36.7 °C)  Pulse:  [] 129  Resp:  [17-18] 18  SpO2:  [94 %-96 %] 94 %  BP: (114-144)/(56-73) 144/56     Weight: 114 kg (251 lb 4.5 oz)  Body mass index is 37.11 kg/m².    Intake/Output Summary (Last 24 hours) at 3/22/2024 1011  Last data filed at 3/21/2024 2335  Gross per 24 hour   Intake 307.5 ml   Output --   Net 307.5 ml         Physical Exam  Vitals and nursing note reviewed.   Constitutional:       General: He is not in acute distress.     Appearance: He is not ill-appearing.   HENT:      Head: Normocephalic.   Eyes:      General: No scleral icterus.     Extraocular Movements: Extraocular movements intact.   Cardiovascular:      Rate and Rhythm: Regular rhythm.  Tachycardia present.      Heart sounds: Normal heart sounds. No murmur heard.  Pulmonary:      Effort: No respiratory distress.      Breath sounds: Normal breath sounds. No wheezing.   Abdominal:      General: There is no distension.      Palpations: Abdomen is soft.      Tenderness: There is no abdominal tenderness.   Musculoskeletal:         General: No tenderness.      Right lower leg: No edema.      Left lower leg: No edema.      Comments: BUE contractures   Skin:     General: Skin is warm and dry.   Neurological:      Mental Status: He is alert and oriented to person, place, and time. Mental status is at baseline.   Psychiatric:         Mood and Affect: Mood normal.         Behavior: Behavior normal.             Significant Labs: All pertinent labs within the past 24 hours have been reviewed.    Significant Imaging: I have reviewed all pertinent imaging results/findings within the past 24 hours.    Assessment/Plan:      * Acute encephalopathy  Resolved  Presented from NH with altered mentation, initially intubated for airway protection 3/4, extubated 3/10. Likely acute on chronic in the setting of history of CVA with likely low cognitive reserve at baseline, and exacerbated by UTI. Varying accounts of baseline makes evaluation difficult. Per daughter: Has not been functionally independent since November (before December admission), currently lives in assisted living and requires assstance with ADLs. Mobility significantly impaird. AOx3 4-5 days out of the week, at other times will be confused and disoriented. 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 CTH on admission unremarkable for acute process. MRI spine obtained on 3/10 showing chronic dicitis  - Status post treatment for UTI  - Neurology on board, expect extended return to baseline given significant infection in the setting of fairly low cognitive reserve, continue treatment of active medical  "issues  -Contacted neuro 3/13 to discuss LP/further workup since patient not near baseline   Per radiology, patient's anatomy should not limit bedside LP attempt.    Neurology to attempt bedside LP 3/15 > deferred for repeat imaging   Repeat imaging limited by motion artifact, neurology deferring further intervention as mental status is improved 3/16  - NG tube to be removed        Chronic indwelling Bryant catheter  REMOVED  Patient admitted to hospital with bryant. Unclear if it was in place for retention or due to history of sacral wound. Patient has not been evaluated by urology.   - passed voiding trial 3/18  - outpatient follow-up with urology     Diarrhea  Resolved  Significant diarrhea reported by nursing AM 3/15. No bowel regimen for over 48 hours and recent abx use. Concern for c diff vs tube feeds > c diff positive  - oral vanc started 3/16       Dysphagia  Dysphagia in the setting of encephalopathy. Required NG tube for prolonged period of time before mental status returned to baseline. Now back on diet as per SLP  - SLP following  - nutrition following    Second degree AV block, Mobitz type I  Stable  Concern for CHB overnight 3/6  -EP: Likely indicative of periods of increased vagal tone or disordered sleep breathing, no evidence of high-degree block noted therefore no indication for PPM/transcutaneous pacing        Hypoglycemia  - no further episodes      Sepsis  Resolved  This patient does have evidence of infective focus  My overall impression is sepsis.  Source: Urinary Tract  Antibiotics given-   Antibiotics (72h ago, onward)      Start     Stop Route Frequency Ordered    03/19/24 1800  vancomycin 125 mg/5 mL oral solution 125 mg  (C. difficile Infection (CDI) Treatment Order Panel)         03/26/24 1159 Oral Every 6 hours 03/19/24 1422          Latest lactate reviewed-  No results for input(s): "LACTATE", "POCLAC" in the last 72 hours.  Organ dysfunction indicated by Encephalopathy    Fluid " "challenge Not needed - patient is not hypotensive      Post- resuscitation assessment No - Post resuscitation assessment not needed       Will Not start Pressors- Levophed for MAP of 65  Source control achieved by: antibiotics    Hypothermia  Resolved      Hemiparesis  - history noted  - PT/OT      Anemia  Patient's anemia is currently controlled. Has not received any PRBCs to date. Etiology likely d/t  chronic disease vs KIMMY, iatrogenic  Current CBC reviewed-   Lab Results   Component Value Date    HGB 6.5 (L) 03/21/2024    HCT 20.2 (L) 03/21/2024     Monitor serial CBC and transfuse if patient becomes hemodynamically unstable, symptomatic or H/H drops below 7/21.    3/21: No signs of active bleeding but Hg < 7 today, blood consent completed and 1 U PRBC ordered.     Clostridium difficile infection  Significant diarrhea reported by nursing AM 3/15. No bowel regimen for over 48 hours and recent abx use. Concern for c diff vs tube feeds > c diff positive.  Last episode of diarrhea 3/16.  - oral vanc started 3/16   - EOT 3/26  - cannot return to SNF on contact precautions  - contact precautions cannot be discontinued until treatment is completed    Hyponatremia  Patient has hyponatremia which is controlled,We will aim to correct the sodium by 4-6mEq in 24 hours. We will monitor sodium Daily. The hyponatremia is due to Dehydration/hypovolemia vs excessive free water flushes. The patient's sodium results have been reviewed and are listed below.  No results for input(s): "NA" in the last 24 hours.      Urinary tract infection without hematuria  now s/p 7 days zosyn  > passed voiding trial 3/18      History of CVA (cerebrovascular accident)  History of R sided hemiparesis from prior CVA        VTE Risk Mitigation (From admission, onward)           Ordered     enoxaparin injection 40 mg  Every 24 hours         03/12/24 1008     IP VTE HIGH RISK PATIENT  Once         03/04/24 0316     Place sequential compression device  " Until discontinued         03/04/24 0316                    Discharge Planning   GLORIA: 3/25/2024     Code Status: Full Code   Is the patient medically ready for discharge?: No    Reason for patient still in hospital (select all that apply): Patient trending condition  Discharge Plan A: Return to nursing home   Discharge Delays: None known at this time              Savanah Walker MD  Department of Hospital Medicine   [facility] - Transplant Stepdown

## 2024-03-22 NOTE — PT/OT/SLP PROGRESS
"Speech Language Pathology Treatment    Patient Name:  Bj Pate Jr.   MRN:  [id number]  Admitting Diagnosis: Acute encephalopathy    Recommendations:                 General Recommendations: Dysphagia therapy and ongoing f/u with Registered Dietician   Diet recommendations:  Minced & Moist Diet - IDDSI Level 5, Liquid Diet Level: Thin liquids - IDDSI Level 0   Aspiration Precautions:  only when awake/alert/attentive and upright, single bites/sips, 1 bite/sip at a time, Assistance with meals, Check for pocketing/oral residue, Eliminate distractions, Feed only when awake/alert, Frequent oral care, Meds crushed in puree, and Monitor for s/s of aspiration. Continue to monitor for signs and symptoms of aspiration and discontinue oral feeding should you notice any of the following: watery eyes, reddened facial area, wet vocal quality, increased work of breathing, change in respiratory status, increased congestion, coughing, fever and/or change in level of alertness   General Precautions: Standard, aspiration, fall  Communication strategies:  go to room if call light pushed    Assessment:     Bj Pate Jr. is a 74 y.o. male with an SLP diagnosis of Dysphagia and Dysphonia.  He presents with minimal appetite.    Subjective     Pt presents alert  He explains, "I drink the Boost"    Pain/Comfort:  Pain Rating 1: other (see comments) (did not rate)    Respiratory Status: Room air    Objective:     Has the patient been evaluated by SLP for swallowing?   Yes  Keep patient NPO? No     Pt found awake and partially upright in bed with call light and suction in reach. He was alert and awake with appropriate response time. He demonstrated a mildly breathy and hoarse vocal quality with decreased intensity, though much improved from day prior. He politely declined PO trials with SLP this service day. He explained he was drinking his Boost and supplemental shakes yet didn't have an appetite for meal tray items. SLP reviewed " alternative offerings for PO trials of advanced textures; however, Pt politely declined.  SLP reviewed ongoing aspiration precautions, dietary modifications, dysphagia diets and SLP POC including plan for ongoing assessment of trials of advanced textures for feasibility for PO advancement. No additional questions. Pt remained in bed in position as found upon SLP exit.     Goals:   Multidisciplinary Problems       SLP Goals          Problem: SLP    Goal Priority Disciplines Outcome   SLP Goal     SLP Ongoing, Progressing   Description: Speech Language Pathology Goals  Goals expected to be met by 3/28  1. Pt will participate in Modified Barium Swallow Study to r/o aspiration and determine safest PO diet. -completed   2. Pt will tolerate Level V minced and moist textures without overt S/S aspiration, MIN A  3. Pt will tolerate trials of advanced textures with adequate oral clearance and no overt S/S aspiration, MIN A                               Plan:     Patient to be seen:  3 x/week   Plan of Care expires:  04/12/24  Plan of Care reviewed with:  patient   SLP Follow-Up:  Yes       Discharge recommendations:  Moderate Intensity Therapy       Time Tracking:     SLP Treatment Date:   03/22/24  Speech Start Time:  1545  Speech Stop Time:  1554     Speech Total Time (min):  9 min    Billable Minutes: Self Care/Home Management Training 9    03/22/2024

## 2024-03-22 NOTE — PT/OT/SLP PROGRESS
"Physical Therapy Treatment    Patient Name:  Bj Pate Jr.   MRN:  [id number]    Recommendations:     Discharge Recommendations: Moderate Intensity Therapy  Discharge Equipment Recommendations: to be determined by next level of care  Barriers to discharge: Decreased caregiver support    Assessment:     Bj Pate Jr. is a 74 y.o. male admitted with a medical diagnosis of Acute encephalopathy.  He presents with the following impairments/functional limitations: weakness, gait instability, impaired balance, impaired functional mobility, impaired self care skills, abnormal tone, edema, impaired skin, impaired cardiopulmonary response to activity, impaired coordination, decreased lower extremity function, decreased upper extremity function, decreased ROM, decreased safety awareness.    Pt agreeable to session at this time. Pt tolerates session well with emphasis on supine BLE therex. Pt performs B ankle pumps actively, Hip ABD/Hip ADD with active assist, and heel slides with passive ROM/light stretch with RLE more painful than LLE. Pt will continue to benefit from skilled therapy services.    Rehab Prognosis: Fair; patient would benefit from acute skilled PT services to address these deficits and reach maximum level of function.    Recent Surgery: * No surgery found *      Plan:     During this hospitalization, patient to be seen 2 x/week to address the identified rehab impairments via therapeutic activities, therapeutic exercises, neuromuscular re-education and progress toward the following goals:    Plan of Care Expires:  04/10/24    Subjective     Chief Complaint: unrated pain "everywhere" and "in my lower regions"  Patient/Family Comments/goals: Grandson's mother asking for medi-chair transfer  Pain/Comfort:  Pain Rating 1: other (see comments) (Pain not rated)  Location - Orientation 1: generalized  Location 1:  ("pain everywhere" "lower regions")  Pain Addressed 1: Distraction  Pain Rating Post-Intervention 1: " other (see comments) (unrated pain everywhere)      Objective:     Communicated with RN (Arlet) prior to session.  Patient found HOB elevated with pulse ox (continuous), Condom Catheter, telemetry, pressure relief boots upon PTA entry to room.     General Precautions: Standard, aspiration, fall  Orthopedic Precautions: N/A  Braces: N/A  Respiratory Status: Room air     Functional Mobility:  Functional mobility deferred at this time      AM-PAC 6 CLICK MOBILITY  Turning over in bed (including adjusting bedclothes, sheets and blankets)?: 2  Sitting down on and standing up from a chair with arms (e.g., wheelchair, bedside commode, etc.): 1  Moving from lying on back to sitting on the side of the bed?: 2  Moving to and from a bed to a chair (including a wheelchair)?: 1  Need to walk in hospital room?: 1  Climbing 3-5 steps with a railing?: 1  Basic Mobility Total Score: 8       Treatment & Education:  Patient provided with daily orientation and goals of this PT session.     Pt educated on purpose of pressure relief boots and importance of wearing them.  Pt educated on importance of trying to tolerate ROM/light stretch to BLE despite pain.    Pt performs the following BLE therex in supine:  X20 AP AROM  X10 Hip ABD/ADD  X5 heel slides PROM with >ROM tolerated on RLE than LLE.    Pt educated to call for assistance and to transfer with hospital staff only.  Also, pt was educated on the effects of prolonged immobility and the importance of performing OOB activity and exercises to promote healing and reduce recovery time.    Patient left HOB elevated with all lines intact, pressure relief boots reapplied,  call button in reach, RN notified, and Grandson's mother present.    GOALS:   Multidisciplinary Problems       Physical Therapy Goals          Problem: Physical Therapy    Goal Priority Disciplines Outcome Goal Variances Interventions   Physical Therapy Goal     PT, PT/OT Ongoing, Progressing     Description: Goals to be  met by: 4/10/24     Patient will increase functional independence with mobility by performin. Supine to sit with Moderate Assistance  2. Sitting at edge of bed x10 minutes with Minimal Assistance to perform functional activities.   3. Lower extremity exercise program x10 reps per handout, with assistance as needed to increase strength for increased functional mobility.                          Time Tracking:     PT Received On: 24  PT Start Time: 1219     PT Stop Time: 1242  PT Total Time (min): 23 min     Billable Minutes: Therapeutic Exercise 23    Treatment Type: Treatment  PT/PTA: PTA     Number of PTA visits since last PT visit: 3     2024

## 2024-03-23 LAB
ABO + RH BLD: NORMAL
ALBUMIN SERPL BCP-MCNC: 1.2 G/DL (ref 3.5–5.2)
ALP SERPL-CCNC: 111 U/L (ref 55–135)
ALT SERPL W/O P-5'-P-CCNC: 49 U/L (ref 10–44)
ANION GAP SERPL CALC-SCNC: 7 MMOL/L (ref 8–16)
AST SERPL-CCNC: 94 U/L (ref 10–40)
BACTERIA #/AREA URNS AUTO: ABNORMAL /HPF
BASOPHILS # BLD AUTO: 0.04 K/UL (ref 0–0.2)
BASOPHILS NFR BLD: 0.2 % (ref 0–1.9)
BILIRUB SERPL-MCNC: 0.5 MG/DL (ref 0.1–1)
BILIRUB UR QL STRIP: NEGATIVE
BLD GP AB SCN CELLS X3 SERPL QL: NORMAL
BLD PROD TYP BPU: NORMAL
BLOOD UNIT EXPIRATION DATE: NORMAL
BLOOD UNIT TYPE CODE: 7300
BLOOD UNIT TYPE: NORMAL
BUN SERPL-MCNC: 31 MG/DL (ref 8–23)
CALCIUM SERPL-MCNC: 9.1 MG/DL (ref 8.7–10.5)
CHLORIDE SERPL-SCNC: 104 MMOL/L (ref 95–110)
CLARITY UR REFRACT.AUTO: CLEAR
CO2 SERPL-SCNC: 24 MMOL/L (ref 23–29)
CODING SYSTEM: NORMAL
COLOR UR AUTO: YELLOW
CREAT SERPL-MCNC: 0.9 MG/DL (ref 0.5–1.4)
CROSSMATCH INTERPRETATION: NORMAL
DIFFERENTIAL METHOD BLD: ABNORMAL
DISPENSE STATUS: NORMAL
EOSINOPHIL # BLD AUTO: 0.1 K/UL (ref 0–0.5)
EOSINOPHIL NFR BLD: 0.8 % (ref 0–8)
ERYTHROCYTE [DISTWIDTH] IN BLOOD BY AUTOMATED COUNT: 17.2 % (ref 11.5–14.5)
EST. GFR  (NO RACE VARIABLE): >60 ML/MIN/1.73 M^2
GLUCOSE SERPL-MCNC: 73 MG/DL (ref 70–110)
GLUCOSE UR QL STRIP: NEGATIVE
HCT VFR BLD AUTO: 20.6 % (ref 40–54)
HGB BLD-MCNC: 6.8 G/DL (ref 14–18)
HGB UR QL STRIP: ABNORMAL
IMM GRANULOCYTES # BLD AUTO: 0.36 K/UL (ref 0–0.04)
IMM GRANULOCYTES NFR BLD AUTO: 2.2 % (ref 0–0.5)
KETONES UR QL STRIP: NEGATIVE
LEUKOCYTE ESTERASE UR QL STRIP: ABNORMAL
LYMPHOCYTES # BLD AUTO: 1 K/UL (ref 1–4.8)
LYMPHOCYTES NFR BLD: 5.9 % (ref 18–48)
MAGNESIUM SERPL-MCNC: 1.6 MG/DL (ref 1.6–2.6)
MCH RBC QN AUTO: 27.2 PG (ref 27–31)
MCHC RBC AUTO-ENTMCNC: 33 G/DL (ref 32–36)
MCV RBC AUTO: 82 FL (ref 82–98)
MICROSCOPIC COMMENT: ABNORMAL
MONOCYTES # BLD AUTO: 1.7 K/UL (ref 0.3–1)
MONOCYTES NFR BLD: 10.5 % (ref 4–15)
NEUTROPHILS # BLD AUTO: 13.1 K/UL (ref 1.8–7.7)
NEUTROPHILS NFR BLD: 80.4 % (ref 38–73)
NITRITE UR QL STRIP: NEGATIVE
NRBC BLD-RTO: 0 /100 WBC
PH UR STRIP: 6 [PH] (ref 5–8)
PHOSPHATE SERPL-MCNC: 2.9 MG/DL (ref 2.7–4.5)
PLATELET # BLD AUTO: 520 K/UL (ref 150–450)
PLATELET BLD QL SMEAR: ABNORMAL
PMV BLD AUTO: 10.6 FL (ref 9.2–12.9)
POTASSIUM SERPL-SCNC: 3.6 MMOL/L (ref 3.5–5.1)
PROCALCITONIN SERPL IA-MCNC: 10.66 NG/ML
PROT SERPL-MCNC: 6.2 G/DL (ref 6–8.4)
PROT UR QL STRIP: ABNORMAL
RBC # BLD AUTO: 2.5 M/UL (ref 4.6–6.2)
RBC #/AREA URNS AUTO: 3 /HPF (ref 0–4)
SODIUM SERPL-SCNC: 135 MMOL/L (ref 136–145)
SP GR UR STRIP: 1.02 (ref 1–1.03)
SPECIMEN OUTDATE: NORMAL
SQUAMOUS #/AREA URNS AUTO: 4 /HPF
TRANS ERYTHROCYTES VOL PATIENT: NORMAL ML
URN SPEC COLLECT METH UR: ABNORMAL
WBC # BLD AUTO: 16.33 K/UL (ref 3.9–12.7)
WBC #/AREA URNS AUTO: 7 /HPF (ref 0–5)

## 2024-03-23 PROCEDURE — 63600175 PHARM REV CODE 636 W HCPCS

## 2024-03-23 PROCEDURE — P9021 RED BLOOD CELLS UNIT: HCPCS | Performed by: HOSPITALIST

## 2024-03-23 PROCEDURE — 84145 PROCALCITONIN (PCT): CPT | Performed by: HOSPITALIST

## 2024-03-23 PROCEDURE — 25000003 PHARM REV CODE 250: Performed by: STUDENT IN AN ORGANIZED HEALTH CARE EDUCATION/TRAINING PROGRAM

## 2024-03-23 PROCEDURE — 36415 COLL VENOUS BLD VENIPUNCTURE: CPT | Mod: XB | Performed by: HOSPITALIST

## 2024-03-23 PROCEDURE — 87040 BLOOD CULTURE FOR BACTERIA: CPT | Performed by: HOSPITALIST

## 2024-03-23 PROCEDURE — 27000207 HC ISOLATION

## 2024-03-23 PROCEDURE — 86901 BLOOD TYPING SEROLOGIC RH(D): CPT | Performed by: HOSPITALIST

## 2024-03-23 PROCEDURE — 25000003 PHARM REV CODE 250

## 2024-03-23 PROCEDURE — 86920 COMPATIBILITY TEST SPIN: CPT | Performed by: HOSPITALIST

## 2024-03-23 PROCEDURE — 83735 ASSAY OF MAGNESIUM: CPT | Performed by: STUDENT IN AN ORGANIZED HEALTH CARE EDUCATION/TRAINING PROGRAM

## 2024-03-23 PROCEDURE — 84100 ASSAY OF PHOSPHORUS: CPT | Performed by: STUDENT IN AN ORGANIZED HEALTH CARE EDUCATION/TRAINING PROGRAM

## 2024-03-23 PROCEDURE — 81001 URINALYSIS AUTO W/SCOPE: CPT | Performed by: HOSPITALIST

## 2024-03-23 PROCEDURE — 85025 COMPLETE CBC W/AUTO DIFF WBC: CPT | Performed by: STUDENT IN AN ORGANIZED HEALTH CARE EDUCATION/TRAINING PROGRAM

## 2024-03-23 PROCEDURE — 80053 COMPREHEN METABOLIC PANEL: CPT | Performed by: STUDENT IN AN ORGANIZED HEALTH CARE EDUCATION/TRAINING PROGRAM

## 2024-03-23 PROCEDURE — 36415 COLL VENOUS BLD VENIPUNCTURE: CPT | Mod: XB | Performed by: STUDENT IN AN ORGANIZED HEALTH CARE EDUCATION/TRAINING PROGRAM

## 2024-03-23 PROCEDURE — 20600001 HC STEP DOWN PRIVATE ROOM

## 2024-03-23 RX ORDER — HYDROCODONE BITARTRATE AND ACETAMINOPHEN 500; 5 MG/1; MG/1
TABLET ORAL
Status: DISCONTINUED | OUTPATIENT
Start: 2024-03-23 | End: 2024-03-27 | Stop reason: HOSPADM

## 2024-03-23 RX ADMIN — ATORVASTATIN CALCIUM 40 MG: 40 TABLET, FILM COATED ORAL at 09:03

## 2024-03-23 RX ADMIN — VANCOMYCIN HYDROCHLORIDE 125 MG: KIT at 05:03

## 2024-03-23 RX ADMIN — MICONAZOLE NITRATE 2 % TOPICAL POWDER: at 09:03

## 2024-03-23 RX ADMIN — VANCOMYCIN HYDROCHLORIDE 125 MG: KIT at 12:03

## 2024-03-23 RX ADMIN — ENOXAPARIN SODIUM 40 MG: 40 INJECTION SUBCUTANEOUS at 05:03

## 2024-03-23 RX ADMIN — CHLORHEXIDINE GLUCONATE 0.12% ORAL RINSE 15 ML: 1.2 LIQUID ORAL at 09:03

## 2024-03-23 RX ADMIN — VANCOMYCIN HYDROCHLORIDE 125 MG: KIT at 06:03

## 2024-03-23 RX ADMIN — SILODOSIN 4 MG: 4 CAPSULE ORAL at 09:03

## 2024-03-23 NOTE — SUBJECTIVE & OBJECTIVE
Interval History:   3/23: Patient with some leukocytosis but only obvious etiology is c dif. Repeat infectious eval in progress. PRBC x 1 ordered.     Review of Systems   Gastrointestinal:  Positive for abdominal pain. Negative for nausea.   Musculoskeletal:  Negative for back pain.   Psychiatric/Behavioral:  Negative for confusion.      Objective:     Vital Signs (Most Recent):  Temp: 98.1 °F (36.7 °C) (03/23/24 1140)  Pulse: 105 (03/23/24 1140)  Resp: 14 (03/23/24 1140)  BP: 115/63 (03/23/24 1140)  SpO2: 95 % (03/23/24 1140) Vital Signs (24h Range):  Temp:  [97.2 °F (36.2 °C)-99.1 °F (37.3 °C)] 98.1 °F (36.7 °C)  Pulse:  [] 105  Resp:  [14-24] 14  SpO2:  [93 %-98 %] 95 %  BP: ()/(59-71) 115/63     Weight: 114 kg (251 lb 4.5 oz)  Body mass index is 37.11 kg/m².    Intake/Output Summary (Last 24 hours) at 3/23/2024 1445  Last data filed at 3/23/2024 1441  Gross per 24 hour   Intake 510 ml   Output --   Net 510 ml         Physical Exam  Vitals and nursing note reviewed.   Constitutional:       General: He is not in acute distress.     Appearance: He is not ill-appearing.   HENT:      Head: Normocephalic.   Eyes:      General: No scleral icterus.     Extraocular Movements: Extraocular movements intact.   Cardiovascular:      Rate and Rhythm: Regular rhythm. Tachycardia present.      Heart sounds: Normal heart sounds. No murmur heard.  Pulmonary:      Effort: No respiratory distress.      Breath sounds: Normal breath sounds. No wheezing.   Abdominal:      General: There is no distension.      Palpations: Abdomen is soft.      Tenderness: There is no abdominal tenderness.   Musculoskeletal:         General: No tenderness.      Right lower leg: No edema.      Left lower leg: No edema.      Comments: BUE contractures   Skin:     General: Skin is warm and dry.   Neurological:      Mental Status: He is alert and oriented to person, place, and time. Mental status is at baseline.   Psychiatric:         Mood and  Affect: Mood normal.         Behavior: Behavior normal.             Significant Labs: All pertinent labs within the past 24 hours have been reviewed.    Significant Imaging: I have reviewed all pertinent imaging results/findings within the past 24 hours.

## 2024-03-23 NOTE — PROGRESS NOTES
[facility] - [facility] Medicine  Progress Note    Patient Name: Bj Pate Jr.  MRN: [id number]  Patient Class: IP- Inpatient   Admission Date: 3/4/2024  Length of Stay: 19 days  Attending Physician: Savanah Walker MD  Primary Care Provider: Jose Antonio Foster MD        Subjective:     Principal Problem:Acute encephalopathy        HPI:  73M with PMH CVA with R sided deficits, DM, HTN who presents to the ED from SNF for AMS. Per chart, he was more somnolent than usual over the last 2 days. At baseline he is verbal and follows commands but does not ambulate. On arrival to ED he was hypothermic to 87.9F and found to be hypoglycemic to the 50s. Intermittently hypotensive to MAP 55. Patient is awake but does not interact meaningfully on exam. Chronic Rivera was initially draining cloudy urine with sediment, Rivera exchanged. Initial labs without leukocytosis, CMP unremarkable, VBG with pH 7.49/30.3/38.9, lactate normal. Received 1L NS bolus through warming pump. Initially admitted to , critical care consulted for severe hypothermia with hypotension.    Overview/Hospital Course:  Admitted to the ED from SNF with acute encephalopathy. Initially admitted to hospital medicine however critical care consulted for severe hypothermia and hypotension.  Intubated 3/4 for airway protection, eventually extubated 3/10.  Workup notable for Citrobacter farmeri UTI, antibiotics tailored accordingly.  Neurology has been on board due to ongoing encephalopathy.  Recommending continued treatment of active medical issues.  Admission further complicated by heart block, likely Wenckebach for which EP was consulted. Likely indicative of periods of increased vagal tone or disordered sleep breathing, no evidnece of high degree block recorded, no indication for PPM. In addition, has been relatively intolerant of p.o. intake, NG tube has been placed with tube feeds started. Failed MBSS. Neurology following, deferred LP for  repeat spinal imaging. Quality limited by motion artifact. Patient's mental status near baseline by 3/15. Cleared for PO by SLP on 3/18. NG tube to be removed. Patient with unclear history of urinary retention and chronic bryant. Bryant catheter removed AM 3/18, passed voiding trial. Diarrhea reported by nursing 3/15, c diff positive, started on oral vanc. Last episode of diarrhea documented on 3/16. Per patient's SNF, patient must be off contact precautions before returning. Patient to remain inpatient until completion of treatment for c diff on 3/26 when contact precautions can be discontinued as per hospital protocol.     Interval History:   3/23: Patient with some leukocytosis but only obvious etiology is c dif. Repeat infectious eval in progress. PRBC x 1 ordered.     Review of Systems   Gastrointestinal:  Positive for abdominal pain. Negative for nausea.   Musculoskeletal:  Negative for back pain.   Psychiatric/Behavioral:  Negative for confusion.      Objective:     Vital Signs (Most Recent):  Temp: 98.1 °F (36.7 °C) (03/23/24 1140)  Pulse: 105 (03/23/24 1140)  Resp: 14 (03/23/24 1140)  BP: 115/63 (03/23/24 1140)  SpO2: 95 % (03/23/24 1140) Vital Signs (24h Range):  Temp:  [97.2 °F (36.2 °C)-99.1 °F (37.3 °C)] 98.1 °F (36.7 °C)  Pulse:  [] 105  Resp:  [14-24] 14  SpO2:  [93 %-98 %] 95 %  BP: ()/(59-71) 115/63     Weight: 114 kg (251 lb 4.5 oz)  Body mass index is 37.11 kg/m².    Intake/Output Summary (Last 24 hours) at 3/23/2024 1445  Last data filed at 3/23/2024 1441  Gross per 24 hour   Intake 510 ml   Output --   Net 510 ml         Physical Exam  Vitals and nursing note reviewed.   Constitutional:       General: He is not in acute distress.     Appearance: He is not ill-appearing.   HENT:      Head: Normocephalic.   Eyes:      General: No scleral icterus.     Extraocular Movements: Extraocular movements intact.   Cardiovascular:      Rate and Rhythm: Regular rhythm. Tachycardia present.       Heart sounds: Normal heart sounds. No murmur heard.  Pulmonary:      Effort: No respiratory distress.      Breath sounds: Normal breath sounds. No wheezing.   Abdominal:      General: There is no distension.      Palpations: Abdomen is soft.      Tenderness: There is no abdominal tenderness.   Musculoskeletal:         General: No tenderness.      Right lower leg: No edema.      Left lower leg: No edema.      Comments: BUE contractures   Skin:     General: Skin is warm and dry.   Neurological:      Mental Status: He is alert and oriented to person, place, and time. Mental status is at baseline.   Psychiatric:         Mood and Affect: Mood normal.         Behavior: Behavior normal.             Significant Labs: All pertinent labs within the past 24 hours have been reviewed.    Significant Imaging: I have reviewed all pertinent imaging results/findings within the past 24 hours.    Assessment/Plan:      * Acute encephalopathy  Resolved  Presented from NH with altered mentation, initially intubated for airway protection 3/4, extubated 3/10. Likely acute on chronic in the setting of history of CVA with likely low cognitive reserve at baseline, and exacerbated by UTI. Varying accounts of baseline makes evaluation difficult. Per daughter: Has not been functionally independent since November (before December admission), currently lives in assisted living and requires assstance with ADLs. Mobility significantly impaird. AOx3 4-5 days out of the week, at other times will be confused and disoriented. 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 CTH on admission unremarkable for acute process. MRI spine obtained on 3/10 showing chronic dicitis  - Status post treatment for UTI  - Neurology on board, expect extended return to baseline given significant infection in the setting of fairly low cognitive reserve, continue treatment of active medical issues  -Contacted neuro  "3/13 to discuss LP/further workup since patient not near baseline   Per radiology, patient's anatomy should not limit bedside LP attempt.    Neurology to attempt bedside LP 3/15 > deferred for repeat imaging   Repeat imaging limited by motion artifact, neurology deferring further intervention as mental status is improved 3/16  - NG tube to be removed        Chronic indwelling Bryant catheter  REMOVED  Patient admitted to hospital with bryant. Unclear if it was in place for retention or due to history of sacral wound. Patient has not been evaluated by urology.   - passed voiding trial 3/18  - outpatient follow-up with urology     Diarrhea  Resolved  Significant diarrhea reported by nursing AM 3/15. No bowel regimen for over 48 hours and recent abx use. Concern for c diff vs tube feeds > c diff positive  - oral vanc started 3/16       Dysphagia  Dysphagia in the setting of encephalopathy. Required NG tube for prolonged period of time before mental status returned to baseline. Now back on diet as per SLP  - SLP following  - nutrition following    Second degree AV block, Mobitz type I  Stable  Concern for CHB overnight 3/6  -EP: Likely indicative of periods of increased vagal tone or disordered sleep breathing, no evidence of high-degree block noted therefore no indication for PPM/transcutaneous pacing        Hypoglycemia  - no further episodes      Sepsis  Resolved  This patient does have evidence of infective focus  My overall impression is sepsis.  Source: Urinary Tract  Antibiotics given-   Antibiotics (72h ago, onward)      Start     Stop Route Frequency Ordered    03/19/24 1800  vancomycin 125 mg/5 mL oral solution 125 mg  (C. difficile Infection (CDI) Treatment Order Panel)         03/26/24 1159 Oral Every 6 hours 03/19/24 1422          Latest lactate reviewed-  No results for input(s): "LACTATE", "POCLAC" in the last 72 hours.  Organ dysfunction indicated by Encephalopathy    Fluid challenge Not needed - patient " "is not hypotensive      Post- resuscitation assessment No - Post resuscitation assessment not needed       Will Not start Pressors- Levophed for MAP of 65  Source control achieved by: antibiotics    Hypothermia  Resolved      Hemiparesis  - history noted  - PT/OT      Anemia  Patient's anemia is currently controlled. Has not received any PRBCs to date. Etiology likely d/t  chronic disease vs KIMMY, iatrogenic  Current CBC reviewed-   Lab Results   Component Value Date    HGB 6.5 (L) 03/21/2024    HCT 20.2 (L) 03/21/2024     Monitor serial CBC and transfuse if patient becomes hemodynamically unstable, symptomatic or H/H drops below 7/21.    3/21: No signs of active bleeding but Hg < 7 today, blood consent completed and 1 U PRBC ordered.     Clostridium difficile infection  Significant diarrhea reported by nursing AM 3/15. No bowel regimen for over 48 hours and recent abx use. Concern for c diff vs tube feeds > c diff positive.  Last episode of diarrhea 3/16.  - oral vanc started 3/16   - EOT 3/26  - cannot return to SNF on contact precautions  - contact precautions cannot be discontinued until treatment is completed    Hyponatremia  Patient has hyponatremia which is controlled,We will aim to correct the sodium by 4-6mEq in 24 hours. We will monitor sodium Daily. The hyponatremia is due to Dehydration/hypovolemia vs excessive free water flushes. The patient's sodium results have been reviewed and are listed below.  No results for input(s): "NA" in the last 24 hours.      Urinary tract infection without hematuria  now s/p 7 days zosyn  > passed voiding trial 3/18      History of CVA (cerebrovascular accident)  History of R sided hemiparesis from prior CVA        VTE Risk Mitigation (From admission, onward)           Ordered     enoxaparin injection 40 mg  Every 24 hours         03/12/24 1008     IP VTE HIGH RISK PATIENT  Once         03/04/24 0316     Place sequential compression device  Until discontinued         " 03/04/24 0316                    Discharge Planning   GLORIA: 3/28/2024     Code Status: Full Code   Is the patient medically ready for discharge?: No    Reason for patient still in hospital (select all that apply): Patient trending condition  Discharge Plan A: Return to nursing home   Discharge Delays: None known at this time              Savanah Walker MD  Department of Hospital Medicine   [facility] - Transplant Stepdown

## 2024-03-23 NOTE — PLAN OF CARE
Problem: Infection  Goal: Absence of Infection Signs and Symptoms  Outcome: Ongoing, Progressing     Problem: Adult Inpatient Plan of Care  Goal: Plan of Care Review  Outcome: Ongoing, Progressing  Goal: Patient-Specific Goal (Individualized)  Outcome: Ongoing, Progressing  Goal: Absence of Hospital-Acquired Illness or Injury  Outcome: Ongoing, Progressing     Problem: Bleeding (Sepsis/Septic Shock)  Goal: Absence of Bleeding  Outcome: Ongoing, Progressing

## 2024-03-24 PROCEDURE — 25000003 PHARM REV CODE 250: Performed by: STUDENT IN AN ORGANIZED HEALTH CARE EDUCATION/TRAINING PROGRAM

## 2024-03-24 PROCEDURE — 20600001 HC STEP DOWN PRIVATE ROOM

## 2024-03-24 PROCEDURE — 63600175 PHARM REV CODE 636 W HCPCS

## 2024-03-24 PROCEDURE — 94668 MNPJ CHEST WALL SBSQ: CPT

## 2024-03-24 PROCEDURE — 36430 TRANSFUSION BLD/BLD COMPNT: CPT

## 2024-03-24 PROCEDURE — 25000003 PHARM REV CODE 250

## 2024-03-24 PROCEDURE — 27000207 HC ISOLATION

## 2024-03-24 RX ADMIN — ENOXAPARIN SODIUM 40 MG: 40 INJECTION SUBCUTANEOUS at 05:03

## 2024-03-24 RX ADMIN — MICONAZOLE NITRATE 2 % TOPICAL POWDER: at 09:03

## 2024-03-24 RX ADMIN — VANCOMYCIN HYDROCHLORIDE 125 MG: KIT at 12:03

## 2024-03-24 RX ADMIN — MICONAZOLE NITRATE 2 % TOPICAL POWDER: at 10:03

## 2024-03-24 RX ADMIN — SILODOSIN 4 MG: 4 CAPSULE ORAL at 10:03

## 2024-03-24 RX ADMIN — ATORVASTATIN CALCIUM 40 MG: 40 TABLET, FILM COATED ORAL at 10:03

## 2024-03-24 RX ADMIN — CHLORHEXIDINE GLUCONATE 0.12% ORAL RINSE 15 ML: 1.2 LIQUID ORAL at 10:03

## 2024-03-24 RX ADMIN — VANCOMYCIN HYDROCHLORIDE 125 MG: KIT at 05:03

## 2024-03-24 RX ADMIN — CHLORHEXIDINE GLUCONATE 0.12% ORAL RINSE 15 ML: 1.2 LIQUID ORAL at 09:03

## 2024-03-24 NOTE — NURSING TRANSFER
Nursing Transfer Note      3/24/2024   12:15 AM    Nurse giving handoff:Jazzmine   Nurse receiving handoff:Nisha    Reason patient is being transferred: level of care    Transfer To: [id number]    Transfer via bed- specialty bed    Transfer with PRBCs infusing    Transported by staff x 2      Telemetry: Box Number bed side monitor  Order for Tele Monitor? Yes      4eyes on Skin: yes    Medicines sent: yes    Any special needs or follow-up needed: no    Patient belongings transferred with patient: Yes    Chart send with patient: Yes      Patient reassessed at: 03/24/2024 @ 0000   Upon arrival to floor: cardiac monitor applied, oriented to room/equipment, IV site flushed & redressed, pt cleansed of blood on abd, gown and linen changed, positioned for comfort.

## 2024-03-24 NOTE — SUBJECTIVE & OBJECTIVE
Interval History:   3/24: infectious eval negative except for c dif. Notes improvement in abdominal pain today.     Review of Systems   Gastrointestinal:  Positive for abdominal pain. Negative for nausea.   Musculoskeletal:  Negative for back pain.   Psychiatric/Behavioral:  Negative for confusion.      Objective:     Vital Signs (Most Recent):  Temp: 98.7 °F (37.1 °C) (03/24/24 0826)  Pulse: 95 (03/24/24 0826)  Resp: 19 (03/24/24 0826)  BP: 106/70 (03/24/24 0826)  SpO2: 97 % (03/24/24 0826) Vital Signs (24h Range):  Temp:  [97.5 °F (36.4 °C)-98.7 °F (37.1 °C)] 98.7 °F (37.1 °C)  Pulse:  [] 95  Resp:  [14-20] 19  SpO2:  [95 %-98 %] 97 %  BP: ()/(55-75) 106/70     Weight: 106.6 kg (235 lb)  Body mass index is 34.7 kg/m².    Intake/Output Summary (Last 24 hours) at 3/24/2024 0930  Last data filed at 3/24/2024 0554  Gross per 24 hour   Intake 685 ml   Output 176 ml   Net 509 ml         Physical Exam  Vitals and nursing note reviewed.   Constitutional:       General: He is not in acute distress.     Appearance: He is not ill-appearing.   HENT:      Head: Normocephalic.   Eyes:      General: No scleral icterus.     Extraocular Movements: Extraocular movements intact.   Cardiovascular:      Rate and Rhythm: Regular rhythm. Tachycardia present.      Heart sounds: Normal heart sounds. No murmur heard.  Pulmonary:      Effort: No respiratory distress.      Breath sounds: Normal breath sounds. No wheezing.   Abdominal:      General: There is no distension.      Palpations: Abdomen is soft.      Tenderness: There is no abdominal tenderness.   Musculoskeletal:         General: No tenderness.      Right lower leg: No edema.      Left lower leg: No edema.      Comments: BUE contractures   Skin:     General: Skin is warm and dry.   Neurological:      Mental Status: He is alert and oriented to person, place, and time. Mental status is at baseline.   Psychiatric:         Mood and Affect: Mood normal.         Behavior:  Behavior normal.             Significant Labs: All pertinent labs within the past 24 hours have been reviewed.    Significant Imaging: I have reviewed all pertinent imaging results/findings within the past 24 hours.

## 2024-03-24 NOTE — NURSING
Report called to Gina MOROCHO on the 14th floor.  Will transport the patient to unit in room [id number]

## 2024-03-24 NOTE — PROGRESS NOTES
[facility] - Stepdown Flex ([place] [id number])  [facility] Medicine  Progress Note    Patient Name: Bj Pate Jr.  MRN: [id number]  Patient Class: IP- Inpatient   Admission Date: 3/4/2024  Length of Stay: 20 days  Attending Physician: Savanah Walker MD  Primary Care Provider: Jose Antonio Foster MD        Subjective:     Principal Problem:Acute encephalopathy        HPI:  73M with PMH CVA with R sided deficits, DM, HTN who presents to the ED from SNF for AMS. Per chart, he was more somnolent than usual over the last 2 days. At baseline he is verbal and follows commands but does not ambulate. On arrival to ED he was hypothermic to 87.9F and found to be hypoglycemic to the 50s. Intermittently hypotensive to MAP 55. Patient is awake but does not interact meaningfully on exam. Chronic Rivera was initially draining cloudy urine with sediment, Rivera exchanged. Initial labs without leukocytosis, CMP unremarkable, VBG with pH 7.49/30.3/38.9, lactate normal. Received 1L NS bolus through warming pump. Initially admitted to , critical care consulted for severe hypothermia with hypotension.    Overview/Hospital Course:  Admitted to the ED from SNF with acute encephalopathy. Initially admitted to hospital medicine however critical care consulted for severe hypothermia and hypotension.  Intubated 3/4 for airway protection, eventually extubated 3/10.  Workup notable for Citrobacter farmeri UTI, antibiotics tailored accordingly.  Neurology has been on board due to ongoing encephalopathy.  Recommending continued treatment of active medical issues.  Admission further complicated by heart block, likely Wenckebach for which EP was consulted. Likely indicative of periods of increased vagal tone or disordered sleep breathing, no evidnece of high degree block recorded, no indication for PPM. In addition, has been relatively intolerant of p.o. intake, NG tube has been placed with tube feeds started. Failed MBSS. Neurology following, deferred  LP for repeat spinal imaging. Quality limited by motion artifact. Patient's mental status near baseline by 3/15. Cleared for PO by SLP on 3/18. NG tube to be removed. Patient with unclear history of urinary retention and chronic bryant. Bryant catheter removed AM 3/18, passed voiding trial. Diarrhea reported by nursing 3/15, c diff positive, started on oral vanc. Last episode of diarrhea documented on 3/16. Per patient's SNF, patient must be off contact precautions before returning. Patient to remain inpatient until completion of treatment for c diff on 3/26 when contact precautions can be discontinued as per hospital protocol.     Interval History:   3/24: infectious eval negative except for c dif. Notes improvement in abdominal pain today.     Review of Systems   Gastrointestinal:  Positive for abdominal pain. Negative for nausea.   Musculoskeletal:  Negative for back pain.   Psychiatric/Behavioral:  Negative for confusion.      Objective:     Vital Signs (Most Recent):  Temp: 98.7 °F (37.1 °C) (03/24/24 0826)  Pulse: 95 (03/24/24 0826)  Resp: 19 (03/24/24 0826)  BP: 106/70 (03/24/24 0826)  SpO2: 97 % (03/24/24 0826) Vital Signs (24h Range):  Temp:  [97.5 °F (36.4 °C)-98.7 °F (37.1 °C)] 98.7 °F (37.1 °C)  Pulse:  [] 95  Resp:  [14-20] 19  SpO2:  [95 %-98 %] 97 %  BP: ()/(55-75) 106/70     Weight: 106.6 kg (235 lb)  Body mass index is 34.7 kg/m².    Intake/Output Summary (Last 24 hours) at 3/24/2024 0930  Last data filed at 3/24/2024 0554  Gross per 24 hour   Intake 685 ml   Output 176 ml   Net 509 ml         Physical Exam  Vitals and nursing note reviewed.   Constitutional:       General: He is not in acute distress.     Appearance: He is not ill-appearing.   HENT:      Head: Normocephalic.   Eyes:      General: No scleral icterus.     Extraocular Movements: Extraocular movements intact.   Cardiovascular:      Rate and Rhythm: Regular rhythm. Tachycardia present.      Heart sounds: Normal heart sounds.  No murmur heard.  Pulmonary:      Effort: No respiratory distress.      Breath sounds: Normal breath sounds. No wheezing.   Abdominal:      General: There is no distension.      Palpations: Abdomen is soft.      Tenderness: There is no abdominal tenderness.   Musculoskeletal:         General: No tenderness.      Right lower leg: No edema.      Left lower leg: No edema.      Comments: BUE contractures   Skin:     General: Skin is warm and dry.   Neurological:      Mental Status: He is alert and oriented to person, place, and time. Mental status is at baseline.   Psychiatric:         Mood and Affect: Mood normal.         Behavior: Behavior normal.             Significant Labs: All pertinent labs within the past 24 hours have been reviewed.    Significant Imaging: I have reviewed all pertinent imaging results/findings within the past 24 hours.    Assessment/Plan:      * Acute encephalopathy  Resolved  Presented from NH with altered mentation, initially intubated for airway protection 3/4, extubated 3/10. Likely acute on chronic in the setting of history of CVA with likely low cognitive reserve at baseline, and exacerbated by UTI. Varying accounts of baseline makes evaluation difficult. Per daughter: Has not been functionally independent since November (before December admission), currently lives in assisted living and requires assstance with ADLs. Mobility significantly impaird. AOx3 4-5 days out of the week, at other times will be confused and disoriented. 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 CTH on admission unremarkable for acute process. MRI spine obtained on 3/10 showing chronic dicitis  - Status post treatment for UTI  - Neurology on board, expect extended return to baseline given significant infection in the setting of fairly low cognitive reserve, continue treatment of active medical issues  -Contacted neuro 3/13 to discuss LP/further workup  "since patient not near baseline   Per radiology, patient's anatomy should not limit bedside LP attempt.    Neurology to attempt bedside LP 3/15 > deferred for repeat imaging   Repeat imaging limited by motion artifact, neurology deferring further intervention as mental status is improved 3/16  - NG tube to be removed        Chronic indwelling Bryant catheter  REMOVED  Patient admitted to hospital with bryant. Unclear if it was in place for retention or due to history of sacral wound. Patient has not been evaluated by urology.   - passed voiding trial 3/18  - outpatient follow-up with urology     Diarrhea  Resolved  Significant diarrhea reported by nursing AM 3/15. No bowel regimen for over 48 hours and recent abx use. Concern for c diff vs tube feeds > c diff positive  - oral vanc started 3/16       Dysphagia  Dysphagia in the setting of encephalopathy. Required NG tube for prolonged period of time before mental status returned to baseline. Now back on diet as per SLP  - SLP following  - nutrition following    Second degree AV block, Mobitz type I  Stable  Concern for CHB overnight 3/6  -EP: Likely indicative of periods of increased vagal tone or disordered sleep breathing, no evidence of high-degree block noted therefore no indication for PPM/transcutaneous pacing        Hypoglycemia  - no further episodes      Sepsis  Resolved  This patient does have evidence of infective focus  My overall impression is sepsis.  Source: Urinary Tract  Antibiotics given-   Antibiotics (72h ago, onward)      Start     Stop Route Frequency Ordered    03/19/24 1800  vancomycin 125 mg/5 mL oral solution 125 mg  (C. difficile Infection (CDI) Treatment Order Panel)         03/26/24 1159 Oral Every 6 hours 03/19/24 1422          Latest lactate reviewed-  No results for input(s): "LACTATE", "POCLAC" in the last 72 hours.  Organ dysfunction indicated by Encephalopathy    Fluid challenge Not needed - patient is not hypotensive      Post- " "resuscitation assessment No - Post resuscitation assessment not needed       Will Not start Pressors- Levophed for MAP of 65  Source control achieved by: antibiotics    Hypothermia  Resolved      Hemiparesis  - history noted  - PT/OT      Anemia  Patient's anemia is currently controlled. Has not received any PRBCs to date. Etiology likely d/t  chronic disease vs KIMMY, iatrogenic  Current CBC reviewed-   Lab Results   Component Value Date    HGB 6.5 (L) 03/21/2024    HCT 20.2 (L) 03/21/2024     Monitor serial CBC and transfuse if patient becomes hemodynamically unstable, symptomatic or H/H drops below 7/21.    3/21: No signs of active bleeding but Hg < 7 today, blood consent completed and 1 U PRBC ordered.     Clostridium difficile infection  Significant diarrhea reported by nursing AM 3/15. No bowel regimen for over 48 hours and recent abx use. Concern for c diff vs tube feeds > c diff positive.  Last episode of diarrhea 3/16.  - oral vanc started 3/16   - EOT 3/26  - cannot return to SNF on contact precautions  - contact precautions cannot be discontinued until treatment is completed    Hyponatremia  Patient has hyponatremia which is controlled,We will aim to correct the sodium by 4-6mEq in 24 hours. We will monitor sodium Daily. The hyponatremia is due to Dehydration/hypovolemia vs excessive free water flushes. The patient's sodium results have been reviewed and are listed below.  No results for input(s): "NA" in the last 24 hours.      Urinary tract infection without hematuria  now s/p 7 days zosyn  > passed voiding trial 3/18      History of CVA (cerebrovascular accident)  History of R sided hemiparesis from prior CVA        VTE Risk Mitigation (From admission, onward)           Ordered     enoxaparin injection 40 mg  Every 24 hours         03/12/24 1008     IP VTE HIGH RISK PATIENT  Once         03/04/24 0316     Place sequential compression device  Until discontinued         03/04/24 0316              "       Discharge Planning   GLORIA: 3/28/2024     Code Status: Full Code   Is the patient medically ready for discharge?: No    Reason for patient still in hospital (select all that apply): Patient trending condition  Discharge Plan A: Return to nursing home   Discharge Delays: None known at this time              Savanah Walker MD  Department of Hospital Medicine   [facility] - Stepdown Flex ([place]-)

## 2024-03-24 NOTE — PLAN OF CARE
Patient is oriented but slow in responding to questions. Oral care done for patient. Patient on oral vanco, administered as prescribed. Patient tolerated well. Other due medication administered. Patient couldn't  eat his breakfast and launch but ate all the food his daughter brought from home. Patient vital signs stable. No other concerns at this time.        Problem: Infection  Goal: Absence of Infection Signs and Symptoms  Outcome: Ongoing, Progressing     Problem: Adult Inpatient Plan of Care  Goal: Plan of Care Review  Outcome: Ongoing, Progressing  Goal: Patient-Specific Goal (Individualized)  Outcome: Ongoing, Progressing  Goal: Absence of Hospital-Acquired Illness or Injury  Outcome: Ongoing, Progressing  Goal: Optimal Comfort and Wellbeing  Outcome: Ongoing, Progressing  Goal: Readiness for Transition of Care  Outcome: Ongoing, Progressing     Problem: Adjustment to Illness (Sepsis/Septic Shock)  Goal: Optimal Coping  Outcome: Ongoing, Progressing     Problem: Bleeding (Sepsis/Septic Shock)  Goal: Absence of Bleeding  Outcome: Ongoing, Progressing     Problem: Glycemic Control Impaired (Sepsis/Septic Shock)  Goal: Blood Glucose Level Within Desired Range  Outcome: Ongoing, Progressing     Problem: Infection Progression (Sepsis/Septic Shock)  Goal: Absence of Infection Signs and Symptoms  Outcome: Ongoing, Progressing     Problem: Nutrition Impaired (Sepsis/Septic Shock)  Goal: Optimal Nutrition Intake  Outcome: Ongoing, Progressing     Problem: Fluid and Electrolyte Imbalance (Acute Kidney Injury/Impairment)  Goal: Fluid and Electrolyte Balance  Outcome: Ongoing, Progressing     Problem: Oral Intake Inadequate (Acute Kidney Injury/Impairment)  Goal: Optimal Nutrition Intake  Outcome: Ongoing, Progressing     Problem: Renal Function Impairment (Acute Kidney Injury/Impairment)  Goal: Effective Renal Function  Outcome: Ongoing, Progressing     Problem: Impaired Wound Healing  Goal: Optimal Wound  Healing  Outcome: Ongoing, Progressing     Problem: Skin Injury Risk Increased  Goal: Skin Health and Integrity  Outcome: Ongoing, Progressing     Problem: Fall Injury Risk  Goal: Absence of Fall and Fall-Related Injury  Outcome: Ongoing, Progressing

## 2024-03-24 NOTE — NURSING
Nurses Note -- 4 Eyes      3/24/2024   12:20 AM      Skin assessed during: Transfer      [] No Altered Skin Integrity Present    []Prevention Measures Documented      [x] Yes- Altered Skin Integrity Present or Discovered   [] LDA Added if Not in Epic (Describe Wound)   [] New Altered Skin Integrity was Present on Admit and Documented in LDA   [] Wound Image Taken    Wound Care Consulted? No    Attending Nurse:  Nisha Lam RN/Staff Member:   BAILEE Reed    Excoriation to buttocks/periarea note, area clean/dry, cream in use as well as powder

## 2024-03-25 LAB
ALBUMIN SERPL BCP-MCNC: 1.4 G/DL (ref 3.5–5.2)
ALP SERPL-CCNC: 106 U/L (ref 55–135)
ALT SERPL W/O P-5'-P-CCNC: 43 U/L (ref 10–44)
ANION GAP SERPL CALC-SCNC: 7 MMOL/L (ref 8–16)
AST SERPL-CCNC: 63 U/L (ref 10–40)
BASOPHILS # BLD AUTO: 0.06 K/UL (ref 0–0.2)
BASOPHILS NFR BLD: 0.5 % (ref 0–1.9)
BILIRUB SERPL-MCNC: 0.6 MG/DL (ref 0.1–1)
BUN SERPL-MCNC: 26 MG/DL (ref 8–23)
CALCIUM SERPL-MCNC: 8.6 MG/DL (ref 8.7–10.5)
CHLORIDE SERPL-SCNC: 106 MMOL/L (ref 95–110)
CO2 SERPL-SCNC: 23 MMOL/L (ref 23–29)
CREAT SERPL-MCNC: 0.8 MG/DL (ref 0.5–1.4)
DIFFERENTIAL METHOD BLD: ABNORMAL
EOSINOPHIL # BLD AUTO: 0.1 K/UL (ref 0–0.5)
EOSINOPHIL NFR BLD: 0.8 % (ref 0–8)
ERYTHROCYTE [DISTWIDTH] IN BLOOD BY AUTOMATED COUNT: 17.2 % (ref 11.5–14.5)
EST. GFR  (NO RACE VARIABLE): >60 ML/MIN/1.73 M^2
GLUCOSE SERPL-MCNC: 80 MG/DL (ref 70–110)
HCT VFR BLD AUTO: 25.4 % (ref 40–54)
HGB BLD-MCNC: 8.3 G/DL (ref 14–18)
IMM GRANULOCYTES # BLD AUTO: 0.58 K/UL (ref 0–0.04)
IMM GRANULOCYTES NFR BLD AUTO: 4.7 % (ref 0–0.5)
LYMPHOCYTES # BLD AUTO: 1.3 K/UL (ref 1–4.8)
LYMPHOCYTES NFR BLD: 10.9 % (ref 18–48)
MAGNESIUM SERPL-MCNC: 1.5 MG/DL (ref 1.6–2.6)
MCH RBC QN AUTO: 27.8 PG (ref 27–31)
MCHC RBC AUTO-ENTMCNC: 32.7 G/DL (ref 32–36)
MCV RBC AUTO: 85 FL (ref 82–98)
MONOCYTES # BLD AUTO: 1.3 K/UL (ref 0.3–1)
MONOCYTES NFR BLD: 10.8 % (ref 4–15)
NEUTROPHILS # BLD AUTO: 8.9 K/UL (ref 1.8–7.7)
NEUTROPHILS NFR BLD: 72.3 % (ref 38–73)
NRBC BLD-RTO: 0 /100 WBC
PHOSPHATE SERPL-MCNC: 3 MG/DL (ref 2.7–4.5)
PLATELET # BLD AUTO: 473 K/UL (ref 150–450)
PMV BLD AUTO: 10 FL (ref 9.2–12.9)
POTASSIUM SERPL-SCNC: 4 MMOL/L (ref 3.5–5.1)
PROT SERPL-MCNC: 6 G/DL (ref 6–8.4)
RBC # BLD AUTO: 2.99 M/UL (ref 4.6–6.2)
SODIUM SERPL-SCNC: 136 MMOL/L (ref 136–145)
WBC # BLD AUTO: 12.24 K/UL (ref 3.9–12.7)

## 2024-03-25 PROCEDURE — 36415 COLL VENOUS BLD VENIPUNCTURE: CPT | Performed by: STUDENT IN AN ORGANIZED HEALTH CARE EDUCATION/TRAINING PROGRAM

## 2024-03-25 PROCEDURE — 80053 COMPREHEN METABOLIC PANEL: CPT | Performed by: STUDENT IN AN ORGANIZED HEALTH CARE EDUCATION/TRAINING PROGRAM

## 2024-03-25 PROCEDURE — 84100 ASSAY OF PHOSPHORUS: CPT | Performed by: STUDENT IN AN ORGANIZED HEALTH CARE EDUCATION/TRAINING PROGRAM

## 2024-03-25 PROCEDURE — 85025 COMPLETE CBC W/AUTO DIFF WBC: CPT | Performed by: STUDENT IN AN ORGANIZED HEALTH CARE EDUCATION/TRAINING PROGRAM

## 2024-03-25 PROCEDURE — 25000003 PHARM REV CODE 250: Performed by: STUDENT IN AN ORGANIZED HEALTH CARE EDUCATION/TRAINING PROGRAM

## 2024-03-25 PROCEDURE — 20600001 HC STEP DOWN PRIVATE ROOM

## 2024-03-25 PROCEDURE — 83735 ASSAY OF MAGNESIUM: CPT | Performed by: STUDENT IN AN ORGANIZED HEALTH CARE EDUCATION/TRAINING PROGRAM

## 2024-03-25 PROCEDURE — 63600175 PHARM REV CODE 636 W HCPCS

## 2024-03-25 PROCEDURE — 27000207 HC ISOLATION

## 2024-03-25 PROCEDURE — 25000003 PHARM REV CODE 250

## 2024-03-25 RX ORDER — SILODOSIN 4 MG/1
4 CAPSULE ORAL DAILY
Qty: 30 CAPSULE | Refills: 11 | Status: SHIPPED | OUTPATIENT
Start: 2024-03-25 | End: 2025-03-25

## 2024-03-25 RX ORDER — MICONAZOLE NITRATE 2 %
POWDER (GRAM) TOPICAL 2 TIMES DAILY
Refills: 0
Start: 2024-03-25

## 2024-03-25 RX ADMIN — CHLORHEXIDINE GLUCONATE 0.12% ORAL RINSE 15 ML: 1.2 LIQUID ORAL at 09:03

## 2024-03-25 RX ADMIN — ENOXAPARIN SODIUM 40 MG: 40 INJECTION SUBCUTANEOUS at 06:03

## 2024-03-25 RX ADMIN — VANCOMYCIN HYDROCHLORIDE 125 MG: KIT at 01:03

## 2024-03-25 RX ADMIN — VANCOMYCIN HYDROCHLORIDE 125 MG: KIT at 05:03

## 2024-03-25 RX ADMIN — MICONAZOLE NITRATE 2 % TOPICAL POWDER: at 09:03

## 2024-03-25 RX ADMIN — ATORVASTATIN CALCIUM 40 MG: 40 TABLET, FILM COATED ORAL at 09:03

## 2024-03-25 RX ADMIN — MICONAZOLE NITRATE 2 % TOPICAL POWDER: at 08:03

## 2024-03-25 RX ADMIN — CHLORHEXIDINE GLUCONATE 0.12% ORAL RINSE 15 ML: 1.2 LIQUID ORAL at 08:03

## 2024-03-25 RX ADMIN — SILODOSIN 4 MG: 4 CAPSULE ORAL at 09:03

## 2024-03-25 RX ADMIN — VANCOMYCIN HYDROCHLORIDE 125 MG: KIT at 02:03

## 2024-03-25 NOTE — SUBJECTIVE & OBJECTIVE
Interval History:   3/25: Hg stabilized. Mild abdominal discomfort but notes improvement.     Review of Systems   Gastrointestinal:  Positive for abdominal pain. Negative for nausea.   Musculoskeletal:  Negative for back pain.   Psychiatric/Behavioral:  Negative for confusion.      Objective:     Vital Signs (Most Recent):  Temp: 98.1 °F (36.7 °C) (03/25/24 0820)  Pulse: 90 (03/25/24 0820)  Resp: 20 (03/25/24 0820)  BP: (!) 102/55 (03/25/24 0820)  SpO2: 98 % (03/25/24 0820) Vital Signs (24h Range):  Temp:  [98.1 °F (36.7 °C)-98.9 °F (37.2 °C)] 98.1 °F (36.7 °C)  Pulse:  [] 90  Resp:  [17-20] 20  SpO2:  [95 %-98 %] 98 %  BP: ()/(55-75) 102/55     Weight: 108.4 kg (239 lb)  Body mass index is 35.29 kg/m².    Intake/Output Summary (Last 24 hours) at 3/25/2024 0920  Last data filed at 3/25/2024 0603  Gross per 24 hour   Intake 360 ml   Output 701 ml   Net -341 ml         Physical Exam  Vitals and nursing note reviewed.   Constitutional:       General: He is not in acute distress.     Appearance: He is not ill-appearing.   HENT:      Head: Normocephalic.   Eyes:      General: No scleral icterus.     Extraocular Movements: Extraocular movements intact.   Cardiovascular:      Rate and Rhythm: Regular rhythm. Tachycardia present.      Heart sounds: Normal heart sounds. No murmur heard.  Pulmonary:      Effort: No respiratory distress.      Breath sounds: Normal breath sounds. No wheezing.   Abdominal:      General: There is no distension.      Palpations: Abdomen is soft.      Tenderness: There is no abdominal tenderness.   Musculoskeletal:         General: No tenderness.      Right lower leg: No edema.      Left lower leg: No edema.      Comments: BUE contractures   Skin:     General: Skin is warm and dry.   Neurological:      Mental Status: He is alert and oriented to person, place, and time. Mental status is at baseline.   Psychiatric:         Mood and Affect: Mood normal.         Behavior: Behavior normal.              Significant Labs: All pertinent labs within the past 24 hours have been reviewed.    Significant Imaging: I have reviewed all pertinent imaging results/findings within the past 24 hours.

## 2024-03-25 NOTE — PROGRESS NOTES
Patient alert and oriented to self; call light and perosonal belongings within reach; free from falls/injuries during the shift; patient awaiting SNF placement; oral vancomycin given per MAR; will continue with poc.

## 2024-03-25 NOTE — PROGRESS NOTES
[facility] ([place] [id number])  [facility] Hospital Medicine  Progress Note    Patient Name: Bj Pate Jr.  MRN: [id number]  Patient Class: IP- Inpatient   Admission Date: 3/4/2024  Length of Stay: 21 days  Attending Physician: Savanah Walker MD  Primary Care Provider: Jose Antonio Foster MD        Subjective:     Principal Problem:Acute encephalopathy        HPI:  73M with PMH CVA with R sided deficits, DM, HTN who presents to the ED from SNF for AMS. Per chart, he was more somnolent than usual over the last 2 days. At baseline he is verbal and follows commands but does not ambulate. On arrival to ED he was hypothermic to 87.9F and found to be hypoglycemic to the 50s. Intermittently hypotensive to MAP 55. Patient is awake but does not interact meaningfully on exam. Chronic Rivera was initially draining cloudy urine with sediment, Rivera exchanged. Initial labs without leukocytosis, CMP unremarkable, VBG with pH 7.49/30.3/38.9, lactate normal. Received 1L NS bolus through warming pump. Initially admitted to , critical care consulted for severe hypothermia with hypotension.    Overview/Hospital Course:  Admitted to the ED from SNF with acute encephalopathy. Initially admitted to hospital medicine however critical care consulted for severe hypothermia and hypotension.  Intubated 3/4 for airway protection, eventually extubated 3/10.  Workup notable for Citrobacter farmeri UTI, antibiotics tailored accordingly.  Neurology has been on board due to ongoing encephalopathy.  Recommending continued treatment of active medical issues.  Admission further complicated by heart block, likely Wenckebach for which EP was consulted. Likely indicative of periods of increased vagal tone or disordered sleep breathing, no evidnece of high degree block recorded, no indication for PPM. In addition, has been relatively intolerant of p.o. intake, NG tube has been placed with tube feeds started. Failed MBSS. Neurology following, deferred  LP for repeat spinal imaging. Quality limited by motion artifact. Patient's mental status near baseline by 3/15. Cleared for PO by SLP on 3/18. NG tube to be removed. Patient with unclear history of urinary retention and chronic bryant. Bryant catheter removed AM 3/18, passed voiding trial. Diarrhea reported by nursing 3/15, c diff positive, started on oral vanc. Last episode of diarrhea documented on 3/16. Per patient's SNF, patient must be off contact precautions before returning. Patient to remain inpatient until completion of treatment for c diff on 3/26 when contact precautions can be discontinued as per hospital protocol.     Interval History:   3/25: Hg stabilized. Mild abdominal discomfort but notes improvement.     Review of Systems   Gastrointestinal:  Positive for abdominal pain. Negative for nausea.   Musculoskeletal:  Negative for back pain.   Psychiatric/Behavioral:  Negative for confusion.      Objective:     Vital Signs (Most Recent):  Temp: 98.1 °F (36.7 °C) (03/25/24 0820)  Pulse: 90 (03/25/24 0820)  Resp: 20 (03/25/24 0820)  BP: (!) 102/55 (03/25/24 0820)  SpO2: 98 % (03/25/24 0820) Vital Signs (24h Range):  Temp:  [98.1 °F (36.7 °C)-98.9 °F (37.2 °C)] 98.1 °F (36.7 °C)  Pulse:  [] 90  Resp:  [17-20] 20  SpO2:  [95 %-98 %] 98 %  BP: ()/(55-75) 102/55     Weight: 108.4 kg (239 lb)  Body mass index is 35.29 kg/m².    Intake/Output Summary (Last 24 hours) at 3/25/2024 0920  Last data filed at 3/25/2024 0603  Gross per 24 hour   Intake 360 ml   Output 701 ml   Net -341 ml         Physical Exam  Vitals and nursing note reviewed.   Constitutional:       General: He is not in acute distress.     Appearance: He is not ill-appearing.   HENT:      Head: Normocephalic.   Eyes:      General: No scleral icterus.     Extraocular Movements: Extraocular movements intact.   Cardiovascular:      Rate and Rhythm: Regular rhythm. Tachycardia present.      Heart sounds: Normal heart sounds. No murmur  heard.  Pulmonary:      Effort: No respiratory distress.      Breath sounds: Normal breath sounds. No wheezing.   Abdominal:      General: There is no distension.      Palpations: Abdomen is soft.      Tenderness: There is no abdominal tenderness.   Musculoskeletal:         General: No tenderness.      Right lower leg: No edema.      Left lower leg: No edema.      Comments: BUE contractures   Skin:     General: Skin is warm and dry.   Neurological:      Mental Status: He is alert and oriented to person, place, and time. Mental status is at baseline.   Psychiatric:         Mood and Affect: Mood normal.         Behavior: Behavior normal.             Significant Labs: All pertinent labs within the past 24 hours have been reviewed.    Significant Imaging: I have reviewed all pertinent imaging results/findings within the past 24 hours.    Assessment/Plan:      * Acute encephalopathy  Resolved  Presented from NH with altered mentation, initially intubated for airway protection 3/4, extubated 3/10. Likely acute on chronic in the setting of history of CVA with likely low cognitive reserve at baseline, and exacerbated by UTI. Varying accounts of baseline makes evaluation difficult. Per daughter: Has not been functionally independent since November (before December admission), currently lives in assisted living and requires assstance with ADLs. Mobility significantly impaird. AOx3 4-5 days out of the week, at other times will be confused and disoriented. 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 CTH on admission unremarkable for acute process. MRI spine obtained on 3/10 showing chronic dicitis  - Status post treatment for UTI  - Neurology on board, expect extended return to baseline given significant infection in the setting of fairly low cognitive reserve, continue treatment of active medical issues  -Contacted neuro 3/13 to discuss LP/further workup since patient  "not near baseline   Per radiology, patient's anatomy should not limit bedside LP attempt.    Neurology to attempt bedside LP 3/15 > deferred for repeat imaging   Repeat imaging limited by motion artifact, neurology deferring further intervention as mental status is improved 3/16          Heart failure  Stable on room air      Bradycardia  stable      Altered mental status  Resolved      Chronic indwelling Bryant catheter  REMOVED  Patient admitted to hospital with bryant. Unclear if it was in place for retention or due to history of sacral wound. Patient has not been evaluated by urology.   - passed voiding trial 3/18  - outpatient follow-up with urology     Diarrhea  Resolved  Significant diarrhea reported by nursing AM 3/15. No bowel regimen for over 48 hours and recent abx use. Concern for c diff vs tube feeds > c diff positive  - oral vanc started 3/16  with end date 3/26      Dysphagia  Dysphagia in the setting of encephalopathy. Required NG tube for prolonged period of time before mental status returned to baseline. Now back on diet as per SLP  - SLP following  - nutrition following    Second degree AV block, Mobitz type I  Stable  Concern for CHB overnight 3/6  -EP: Likely indicative of periods of increased vagal tone or disordered sleep breathing, no evidence of high-degree block noted therefore no indication for PPM/transcutaneous pacing        Hypoglycemia  - no further episodes      Sepsis  Resolved  This patient does have evidence of infective focus  My overall impression is sepsis.  Source: Urinary Tract  Antibiotics given-   Antibiotics (72h ago, onward)      Start     Stop Route Frequency Ordered    03/19/24 1800  vancomycin 125 mg/5 mL oral solution 125 mg  (C. difficile Infection (CDI) Treatment Order Panel)         03/26/24 1159 Oral Every 6 hours 03/19/24 1422          Latest lactate reviewed-  No results for input(s): "LACTATE", "POCLAC" in the last 72 hours.  Organ dysfunction indicated by " "Encephalopathy    Fluid challenge Not needed - patient is not hypotensive      Post- resuscitation assessment No - Post resuscitation assessment not needed       Will Not start Pressors- Levophed for MAP of 65  Source control achieved by: antibiotics    Hypothermia  Resolved      Hemiparesis  - history noted  - PT/OT      Anemia  Patient's anemia is currently controlled. Has not received any PRBCs to date. Etiology likely d/t  chronic disease vs KIMMY, iatrogenic  Current CBC reviewed-   Lab Results   Component Value Date    HGB 8.3 (L) 03/25/2024    HCT 25.4 (L) 03/25/2024     Monitor serial CBC and transfuse if patient becomes hemodynamically unstable, symptomatic or H/H drops below 7/21.    3/21: No signs of active bleeding but Hg < 7 today, blood consent completed and 1 U PRBC ordered.     Clostridium difficile infection  Significant diarrhea reported by nursing AM 3/15. No bowel regimen for over 48 hours and recent abx use. Concern for c diff vs tube feeds > c diff positive.  Last episode of diarrhea 3/16.  - oral vanc started 3/16   - EOT 3/26  - cannot return to SNF on contact precautions  - contact precautions cannot be discontinued until treatment is completed    Hyponatremia  Patient has hyponatremia which is controlled,We will aim to correct the sodium by 4-6mEq in 24 hours. We will monitor sodium Daily. The hyponatremia is due to Dehydration/hypovolemia vs excessive free water flushes. The patient's sodium results have been reviewed and are listed below.  No results for input(s): "NA" in the last 24 hours.      Urinary tract infection without hematuria  now s/p 7 days zosyn  > passed voiding trial 3/18      History of CVA (cerebrovascular accident)  History of R sided hemiparesis from prior CVA        VTE Risk Mitigation (From admission, onward)           Ordered     enoxaparin injection 40 mg  Every 24 hours         03/12/24 1008     IP VTE HIGH RISK PATIENT  Once         03/04/24 0316     Place " sequential compression device  Until discontinued         03/04/24 0316                    Discharge Planning   GLORIA: 3/27/2024     Code Status: Full Code   Is the patient medically ready for discharge?: No    Reason for patient still in hospital (select all that apply): Patient trending condition  Discharge Plan A: Return to nursing home   Discharge Delays: None known at this time              Savanah Walker MD  Department of Hospital Medicine   [facility] - Stepdown Flex ([place])

## 2024-03-25 NOTE — PLAN OF CARE
Problem: Adult Inpatient Plan of Care  Goal: Plan of Care Review  Outcome: Ongoing, Progressing  Flowsheets (Taken 3/25/2024 1751)  Plan of Care Reviewed With:   patient   family  Goal: Patient-Specific Goal (Individualized)  Outcome: Ongoing, Progressing  Goal: Absence of Hospital-Acquired Illness or Injury  Outcome: Ongoing, Progressing  Intervention: Identify and Manage Fall Risk  Flowsheets (Taken 3/25/2024 1751)  Safety Promotion/Fall Prevention:   assistive device/personal item within reach   Fall Risk reviewed with patient/family   side rails raised x 2  Intervention: Prevent Skin Injury  Flowsheets (Taken 3/25/2024 1751)  Body Position: position maintained  Skin Protection:   incontinence pads utilized   tubing/devices free from skin contact  Intervention: Prevent and Manage VTE (Venous Thromboembolism) Risk  Flowsheets (Taken 3/25/2024 1751)  Activity Management: Rolling - L1  VTE Prevention/Management: bleeding precations maintained  Range of Motion: active ROM (range of motion) encouraged  Intervention: Prevent Infection  Flowsheets (Taken 3/25/2024 1751)  Infection Prevention: single patient room provided  Goal: Optimal Comfort and Wellbeing  Outcome: Ongoing, Progressing

## 2024-03-25 NOTE — PROGRESS NOTES
Nurses Note -- 4 Eyes      3/25/2024   5:51 PM      Skin assessed during: Daily Assessment      [x] No Pressure Injuries Present    []Prevention Measures Documented      [] Yes- Altered Skin Integrity Present or Discovered   [] LDA Added if Not in Epic (Describe Wound)   [] New Altered Skin Integrity was Present on Admit and Documented in LDA   [] Wound Image Taken    Wound Care Consulted? No    Attending Nurse:  Candy Abbasi RN     Second RN/Staff Member:  Boni Hauser LPN

## 2024-03-25 NOTE — ASSESSMENT & PLAN NOTE
Patient's anemia is currently controlled. Has not received any PRBCs to date. Etiology likely d/t  chronic disease vs KIMMY, iatrogenic  Current CBC reviewed-   Lab Results   Component Value Date    HGB 8.3 (L) 03/25/2024    HCT 25.4 (L) 03/25/2024     Monitor serial CBC and transfuse if patient becomes hemodynamically unstable, symptomatic or H/H drops below 7/21.    3/21: No signs of active bleeding but Hg < 7 today, blood consent completed and 1 U PRBC ordered.

## 2024-03-25 NOTE — ASSESSMENT & PLAN NOTE
Resolved  Significant diarrhea reported by nursing AM 3/15. No bowel regimen for over 48 hours and recent abx use. Concern for c diff vs tube feeds > c diff positive  - oral vanc started 3/16  with end date 3/26

## 2024-03-25 NOTE — PLAN OF CARE
03/25/24 1506   Discharge Reassessment   Assessment Type Discharge Planning Reassessment   Did the patient's condition or plan change since previous assessment? No   Discharge Plan discussed with: Patient   Communicated GLORIA with patient/caregiver Date not available/Unable to determine   Discharge Plan A Skilled Nursing Facility   Discharge Plan B Return to Nursing Home   DME Needed Upon Discharge  none   Transition of Care Barriers None   Why the patient remains in the hospital Requires continued medical care   Post-Acute Status   Post-Acute Authorization Placement   Post-Acute Placement Status Referrals Sent   Hospital Resources/Appts/Education Provided Provided patient/caregiver with written discharge plan information   Patient choice form signed by patient/caregiver List with quality metrics by geographic area provided   Discharge Delays None known at this time     [facility] - Stepdown Flex ([place])  Discharge Reassessment    Discharge Plan A and Plan B have been determined by review of patient's clinical status, future medical and therapeutic needs, and coverage/benefits for post-acute care in coordination with multidisciplinary team members.    Gris Bush MSW, CSW

## 2024-03-25 NOTE — PLAN OF CARE
[facility]     Department of Hospital Medicine     [address] [place] [address]     [phone] after hours  [phone] fax       NURSING HOME ORDERS    Patient Name: Bj Pate Jr.  YOB: [birth year]    Admit to Nursing Home:    Skilled Bed      Code full                                                Diagnoses:  Active Hospital Problems    Diagnosis  POA    *Acute encephalopathy [G93.40]  Yes    Altered mental status [R41.82]  Yes    Bradycardia [R00.1]  Yes    Heart failure [I50.9]  Yes    Chronic indwelling Rivera catheter [Z97.8]  Not Applicable    Diarrhea [R19.7]  No    Dysphagia [R13.10]  Yes    Second degree AV block, Mobitz type I [I44.1]  Yes    Hypothermia [T68.XXXA]  Yes    Sepsis [A41.9]  Yes    Hemiparesis [G81.90]  Yes    Anemia [D64.9]  Yes     Likely due to ckd, chronic disease, recommend checking iron deficiency      Clostridium difficile infection [A49.8]  Yes    Urinary tract infection without hematuria [N39.0]  Yes    History of CVA (cerebrovascular accident) [Z86.73]  Not Applicable      Resolved Hospital Problems    Diagnosis Date Resolved POA    History of cerebral parenchymal hemorrhage [Z86.79] 03/04/2024 Not Applicable       Patient is homebound due to:  Acute encephalopathy    Allergies:  Review of patient's allergies indicates:   Allergen Reactions    Tomato (solanum lycopersicum) Hives    Naproxen Hives    Shrimp Other (See Comments)       Vitals:    Every shift (Skilled Nursing patients)    Diet:  Minced and moist diet                    Acitivities:  - Up in a chair each morning as tolerated  - Ambulate with assistance to bathroom  - Scheduled walks once each shift (every 8 hours)  - May ambulate independently  - May use walker, cane, or self-propelled wheelchair       - Weight bearing: as tolerated    LABS:  Per facility protocol  Nursing Precautions:      - Aspiration precautions:             - Total  assistance with meals            -  Upright 90 degrees befor during and after meals             -  Suction at bedside          - Fall precautions per nursing home protocol   - Seizure precaution per [facility] protocol   - Decubitus precautions:        -  for positioning   - Pressure reducing foam mattress   - Turn patient every two hours. Use wedge pillows to anchor patient    CONSULTS:      Physical Therapy to evaluate and treat     Occupational Therapy to evaluate and treat     Speech Therapy  to evaluate and treat     Nutrition to evaluate and recommend diet       MISCELLANEOUS CARE:                Routine Skin for Bedridden Patients:  Apply moisture barrier cream to all    skin folds and wet areas in perineal area daily and after baths and                           all bowel movements.    Cleanse groin and abdominal folds with sterile normal saline and pat dry. Apply antifungal powder BID and PRN if soiled.    Cleanse sacrum with sterile normal saline and pat dry. Apply triad BID and PRN        Medications: Discontinue all previous medication orders, if any. See new list below.     Current Discharge Medication List        START taking these medications    Details   miconazole NITRATE 2 % (MICOTIN) 2 % top powder Apply topically 2 (two) times daily.  Refills: 0   Apply to abdomen and groin folds   silodosin (RAPAFLO) 4 mg Cap capsule Take 1 capsule (4 mg total) by mouth once daily.  Qty: 30 capsule, Refills: 11           CONTINUE these medications which have NOT CHANGED    Details   atorvastatin (LIPITOR) 40 MG tablet Take 1 tablet (40 mg total) by mouth once daily.  Qty: 90 tablet, Refills: 1    Associated Diagnoses: Hyperlipidemia, acquired      aspirin (ECOTRIN) 81 MG EC tablet Take 81 mg by mouth once daily.                 fluticasone-salmeterol diskus inhaler 500-50 mcg Inhale 1 puff into the lungs 2 (two) times daily. Controller  Qty: 60 each, Refills: 11    Associated Diagnoses: COPD, moderate       Lactobacillus rhamnosus GG (CULTURELLE) 10 billion cell capsule 1 capsule by oral once daily.  Qty: 60 capsule, Refills: 3                            _________________________________  Savanah Walker MD  03/25/2024

## 2024-03-25 NOTE — ASSESSMENT & PLAN NOTE
Resolved  Presented from NH with altered mentation, initially intubated for airway protection 3/4, extubated 3/10. Likely acute on chronic in the setting of history of CVA with likely low cognitive reserve at baseline, and exacerbated by UTI. Varying accounts of baseline makes evaluation difficult. Per daughter: Has not been functionally independent since November (before December admission), currently lives in assisted living and requires assstance with ADLs. Mobility significantly impaird. AOx3 4-5 days out of the week, at other times will be confused and disoriented. 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 CTH on admission unremarkable for acute process. MRI spine obtained on 3/10 showing chronic dicitis  - Status post treatment for UTI  - Neurology on board, expect extended return to baseline given significant infection in the setting of fairly low cognitive reserve, continue treatment of active medical issues  -Contacted neuro 3/13 to discuss LP/further workup since patient not near baseline   Per radiology, patient's anatomy should not limit bedside LP attempt.    Neurology to attempt bedside LP 3/15 > deferred for repeat imaging   Repeat imaging limited by motion artifact, neurology deferring further intervention as mental status is improved 3/16

## 2024-03-26 PROCEDURE — 94761 N-INVAS EAR/PLS OXIMETRY MLT: CPT

## 2024-03-26 PROCEDURE — 25000003 PHARM REV CODE 250: Performed by: INTERNAL MEDICINE

## 2024-03-26 PROCEDURE — 94668 MNPJ CHEST WALL SBSQ: CPT

## 2024-03-26 PROCEDURE — 20600001 HC STEP DOWN PRIVATE ROOM

## 2024-03-26 PROCEDURE — 25000003 PHARM REV CODE 250

## 2024-03-26 PROCEDURE — 25000003 PHARM REV CODE 250: Performed by: STUDENT IN AN ORGANIZED HEALTH CARE EDUCATION/TRAINING PROGRAM

## 2024-03-26 PROCEDURE — 63600175 PHARM REV CODE 636 W HCPCS

## 2024-03-26 PROCEDURE — 27000207 HC ISOLATION

## 2024-03-26 PROCEDURE — 99900035 HC TECH TIME PER 15 MIN (STAT)

## 2024-03-26 RX ADMIN — ATORVASTATIN CALCIUM 40 MG: 40 TABLET, FILM COATED ORAL at 09:03

## 2024-03-26 RX ADMIN — ENOXAPARIN SODIUM 40 MG: 40 INJECTION SUBCUTANEOUS at 05:03

## 2024-03-26 RX ADMIN — MICONAZOLE NITRATE 2 % TOPICAL POWDER: at 08:03

## 2024-03-26 RX ADMIN — CHLORHEXIDINE GLUCONATE 0.12% ORAL RINSE 15 ML: 1.2 LIQUID ORAL at 08:03

## 2024-03-26 RX ADMIN — MICONAZOLE NITRATE 2 % TOPICAL POWDER: at 09:03

## 2024-03-26 RX ADMIN — VANCOMYCIN HYDROCHLORIDE 125 MG: KIT at 02:03

## 2024-03-26 RX ADMIN — SILODOSIN 4 MG: 4 CAPSULE ORAL at 09:03

## 2024-03-26 RX ADMIN — CHLORHEXIDINE GLUCONATE 0.12% ORAL RINSE 15 ML: 1.2 LIQUID ORAL at 09:03

## 2024-03-26 NOTE — PLAN OF CARE
Problem: Infection  Goal: Absence of Infection Signs and Symptoms  Outcome: Ongoing, Progressing  Intervention: Prevent or Manage Infection  Flowsheets (Taken 3/26/2024 1344)  Isolation Precautions: precautions maintained     Problem: Adult Inpatient Plan of Care  Goal: Plan of Care Review  Outcome: Ongoing, Progressing  Flowsheets (Taken 3/26/2024 1344)  Plan of Care Reviewed With: patient  Goal: Patient-Specific Goal (Individualized)  Outcome: Ongoing, Progressing  Goal: Absence of Hospital-Acquired Illness or Injury  Outcome: Ongoing, Progressing  Intervention: Identify and Manage Fall Risk  Flowsheets (Taken 3/26/2024 1344)  Safety Promotion/Fall Prevention:   assistive device/personal item within reach   side rails raised x 2  Intervention: Prevent Skin Injury  Flowsheets (Taken 3/26/2024 1344)  Body Position: position maintained  Skin Protection: tubing/devices free from skin contact  Intervention: Prevent and Manage VTE (Venous Thromboembolism) Risk  Flowsheets (Taken 3/26/2024 1344)  Activity Management: Arm raise - L1  VTE Prevention/Management: bleeding precations maintained  Range of Motion: active ROM (range of motion) encouraged  Intervention: Prevent Infection  Flowsheets (Taken 3/26/2024 1344)  Infection Prevention: single patient room provided  Goal: Optimal Comfort and Wellbeing  Outcome: Ongoing, Progressing  Intervention: Monitor Pain and Promote Comfort  Flowsheets (Taken 3/26/2024 1344)  Pain Management Interventions: pain management plan reviewed with patient/caregiver  Intervention: Provide Person-Centered Care  Flowsheets (Taken 3/26/2024 1344)  Trust Relationship/Rapport:   care explained   choices provided   thoughts/feelings acknowledged

## 2024-03-26 NOTE — NURSING
Pt with speech delay not able to orient, VSS, NS on the monitor. Pt had 3x watery stools on shift. Tolerated oral abx without issues. No other events to report.

## 2024-03-26 NOTE — ASSESSMENT & PLAN NOTE
Significant diarrhea reported by nursing AM 3/15. No bowel regimen for over 48 hours and recent abx use. Concern for c diff vs tube feeds > c diff positive.  Last episode of diarrhea 3/16.  - oral vanc started 3/16   - EOT 3/26  - can dc contact precaution on 3/26 as treatment completd

## 2024-03-26 NOTE — PROGRESS NOTES
[facility] - Stepdown Flex ([place] [id number])  [facility] Medicine  Progress Note    Patient Name: Bj Pate Jr.  MRN: [id number]  Patient Class: IP- Inpatient   Admission Date: 3/4/2024  Length of Stay: 22 days  Attending Physician: Savanah Walker MD  Primary Care Provider: Jose Antonio Foster MD        Subjective:     Principal Problem:Acute encephalopathy        HPI:  73M with PMH CVA with R sided deficits, DM, HTN who presents to the ED from SNF for AMS. Per chart, he was more somnolent than usual over the last 2 days. At baseline he is verbal and follows commands but does not ambulate. On arrival to ED he was hypothermic to 87.9F and found to be hypoglycemic to the 50s. Intermittently hypotensive to MAP 55. Patient is awake but does not interact meaningfully on exam. Chronic Rivera was initially draining cloudy urine with sediment, Rivera exchanged. Initial labs without leukocytosis, CMP unremarkable, VBG with pH 7.49/30.3/38.9, lactate normal. Received 1L NS bolus through warming pump. Initially admitted to , critical care consulted for severe hypothermia with hypotension.    Overview/Hospital Course:  Admitted to the ED from SNF with acute encephalopathy. Initially admitted to hospital medicine however critical care consulted for severe hypothermia and hypotension.  Intubated 3/4 for airway protection, eventually extubated 3/10.  Workup notable for Citrobacter farmeri UTI, antibiotics tailored accordingly.  Neurology has been on board due to ongoing encephalopathy.  Recommending continued treatment of active medical issues.  Admission further complicated by heart block, likely Wenckebach for which EP was consulted. Likely indicative of periods of increased vagal tone or disordered sleep breathing, no evidnece of high degree block recorded, no indication for PPM. In addition, has been relatively intolerant of p.o. intake, NG tube has been placed with tube feeds started. Failed MBSS. Neurology following, deferred  LP for repeat spinal imaging. Quality limited by motion artifact. Patient's mental status near baseline by 3/15. Cleared for PO by SLP on 3/18. NG tube to be removed. Patient with unclear history of urinary retention and chronic bryant. Bryant catheter removed AM 3/18, passed voiding trial. Diarrhea reported by nursing 3/15, c diff positive, started on oral vanc. Last episode of diarrhea documented on 3/16. Per patient's SNF, patient must be off contact precautions before returning. Patient to remain inpatient until completion of treatment for c diff on 3/26 when contact precautions can be discontinued as per hospital protocol.     Interval History:   3/26: Last day of c dif treatment completed. Patient medically appropriate for discharge to SNF facility when available.     Review of Systems   Gastrointestinal:  Positive for abdominal pain. Negative for nausea.   Musculoskeletal:  Negative for back pain.   Psychiatric/Behavioral:  Negative for confusion.      Objective:     Vital Signs (Most Recent):  Temp: 98 °F (36.7 °C) (03/26/24 0740)  Pulse: 109 (03/26/24 1055)  Resp: 20 (03/26/24 0740)  BP: 118/66 (03/26/24 0740)  SpO2: 99 % (03/26/24 1055) Vital Signs (24h Range):  Temp:  [98 °F (36.7 °C)-98.5 °F (36.9 °C)] 98 °F (36.7 °C)  Pulse:  [] 109  Resp:  [18-22] 20  SpO2:  [94 %-99 %] 99 %  BP: (112-131)/(55-83) 118/66     Weight: 108.4 kg (239 lb)  Body mass index is 35.29 kg/m².    Intake/Output Summary (Last 24 hours) at 3/26/2024 1144  Last data filed at 3/26/2024 0628  Gross per 24 hour   Intake --   Output 500 ml   Net -500 ml         Physical Exam  Vitals and nursing note reviewed.   Constitutional:       General: He is not in acute distress.     Appearance: He is not ill-appearing.   HENT:      Head: Normocephalic.   Eyes:      General: No scleral icterus.     Extraocular Movements: Extraocular movements intact.   Cardiovascular:      Rate and Rhythm: Regular rhythm. Tachycardia present.      Heart  sounds: Normal heart sounds. No murmur heard.  Pulmonary:      Effort: No respiratory distress.      Breath sounds: Normal breath sounds. No wheezing.   Abdominal:      General: There is no distension.      Palpations: Abdomen is soft.      Tenderness: There is no abdominal tenderness.   Musculoskeletal:         General: No tenderness.      Right lower leg: No edema.      Left lower leg: No edema.      Comments: BUE contractures   Skin:     General: Skin is warm and dry.   Neurological:      Mental Status: He is alert and oriented to person, place, and time. Mental status is at baseline.   Psychiatric:         Mood and Affect: Mood normal.         Behavior: Behavior normal.             Significant Labs: All pertinent labs within the past 24 hours have been reviewed.    Significant Imaging: I have reviewed all pertinent imaging results/findings within the past 24 hours.    Assessment/Plan:      * Acute encephalopathy  Resolved  Presented from NH with altered mentation, initially intubated for airway protection 3/4, extubated 3/10. Likely acute on chronic in the setting of history of CVA with likely low cognitive reserve at baseline, and exacerbated by UTI. Varying accounts of baseline makes evaluation difficult. Per daughter: Has not been functionally independent since November (before December admission), currently lives in assisted living and requires assstance with ADLs. Mobility significantly impaird. AOx3 4-5 days out of the week, at other times will be confused and disoriented. 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 CTH on admission unremarkable for acute process. MRI spine obtained on 3/10 showing chronic dicitis  - Status post treatment for UTI  - Neurology on board, expect extended return to baseline given significant infection in the setting of fairly low cognitive reserve, continue treatment of active medical issues  -Contacted neuro 3/13 to  "discuss LP/further workup since patient not near baseline   Per radiology, patient's anatomy should not limit bedside LP attempt.    Neurology to attempt bedside LP 3/15 > deferred for repeat imaging   Repeat imaging limited by motion artifact, neurology deferring further intervention as mental status is improved 3/16          Heart failure  Stable on room air      Bradycardia  stable      Altered mental status  Resolved      Chronic indwelling Bryant catheter  REMOVED  Patient admitted to hospital with bryant. Unclear if it was in place for retention or due to history of sacral wound. Patient has not been evaluated by urology.   - passed voiding trial 3/18  - outpatient follow-up with urology     Diarrhea  Resolved  Significant diarrhea reported by nursing AM 3/15. No bowel regimen for over 48 hours and recent abx use. Concern for c diff vs tube feeds > c diff positive  - oral vanc started 3/16  with end date 3/26      Dysphagia  Dysphagia in the setting of encephalopathy. Required NG tube for prolonged period of time before mental status returned to baseline. Now back on diet as per SLP  - SLP following  - nutrition following    Second degree AV block, Mobitz type I  Stable  Concern for CHB overnight 3/6  -EP: Likely indicative of periods of increased vagal tone or disordered sleep breathing, no evidence of high-degree block noted therefore no indication for PPM/transcutaneous pacing        Hypoglycemia  - no further episodes      Sepsis  Resolved  This patient does have evidence of infective focus  My overall impression is sepsis.  Source: Urinary Tract  Antibiotics given-   Antibiotics (72h ago, onward)      Start     Stop Route Frequency Ordered    03/19/24 1800  vancomycin 125 mg/5 mL oral solution 125 mg  (C. difficile Infection (CDI) Treatment Order Panel)         03/26/24 1159 Oral Every 6 hours 03/19/24 1422          Latest lactate reviewed-  No results for input(s): "LACTATE", "POCLAC" in the last 72 " "hours.  Organ dysfunction indicated by Encephalopathy    Fluid challenge Not needed - patient is not hypotensive      Post- resuscitation assessment No - Post resuscitation assessment not needed       Will Not start Pressors- Levophed for MAP of 65  Source control achieved by: antibiotics    Hypothermia  Resolved      Hemiparesis  - history noted  - PT/OT      Anemia  Patient's anemia is currently controlled. Has not received any PRBCs to date. Etiology likely d/t  chronic disease vs KIMMY, iatrogenic  Current CBC reviewed-   Lab Results   Component Value Date    HGB 8.3 (L) 03/25/2024    HCT 25.4 (L) 03/25/2024     Monitor serial CBC and transfuse if patient becomes hemodynamically unstable, symptomatic or H/H drops below 7/21.    3/21: No signs of active bleeding but Hg < 7 today, blood consent completed and 1 U PRBC ordered.     Clostridium difficile infection  Significant diarrhea reported by nursing AM 3/15. No bowel regimen for over 48 hours and recent abx use. Concern for c diff vs tube feeds > c diff positive.  Last episode of diarrhea 3/16.  - oral vanc started 3/16   - EOT 3/26  - can dc contact precaution on 3/26 as treatment completd    Hyponatremia  Patient has hyponatremia which is controlled,We will aim to correct the sodium by 4-6mEq in 24 hours. We will monitor sodium Daily. The hyponatremia is due to Dehydration/hypovolemia vs excessive free water flushes. The patient's sodium results have been reviewed and are listed below.  No results for input(s): "NA" in the last 24 hours.      Urinary tract infection without hematuria  now s/p 7 days zosyn  > passed voiding trial 3/18      History of CVA (cerebrovascular accident)  History of R sided hemiparesis from prior CVA        VTE Risk Mitigation (From admission, onward)           Ordered     enoxaparin injection 40 mg  Every 24 hours         03/12/24 1008     IP VTE HIGH RISK PATIENT  Once         03/04/24 0316     Place sequential compression device  " Until discontinued         03/04/24 0316                    Discharge Planning   GLORIA: 3/26/2024     Code Status: Full Code   Is the patient medically ready for discharge?: Yes    Reason for patient still in hospital (select all that apply): Patient trending condition  Discharge Plan A: Skilled Nursing Facility   Discharge Delays: None known at this time              Savanah Walker MD  Department of Hospital Medicine   [facility] - Stepdown Flex ([place]-)

## 2024-03-26 NOTE — SUBJECTIVE & OBJECTIVE
Interval History:   3/26: Last day of c dif treatment completed. Patient medically appropriate for discharge to SNF facility when available.     Review of Systems   Gastrointestinal:  Positive for abdominal pain. Negative for nausea.   Musculoskeletal:  Negative for back pain.   Psychiatric/Behavioral:  Negative for confusion.      Objective:     Vital Signs (Most Recent):  Temp: 98 °F (36.7 °C) (03/26/24 0740)  Pulse: 109 (03/26/24 1055)  Resp: 20 (03/26/24 0740)  BP: 118/66 (03/26/24 0740)  SpO2: 99 % (03/26/24 1055) Vital Signs (24h Range):  Temp:  [98 °F (36.7 °C)-98.5 °F (36.9 °C)] 98 °F (36.7 °C)  Pulse:  [] 109  Resp:  [18-22] 20  SpO2:  [94 %-99 %] 99 %  BP: (112-131)/(55-83) 118/66     Weight: 108.4 kg (239 lb)  Body mass index is 35.29 kg/m².    Intake/Output Summary (Last 24 hours) at 3/26/2024 1144  Last data filed at 3/26/2024 0628  Gross per 24 hour   Intake --   Output 500 ml   Net -500 ml         Physical Exam  Vitals and nursing note reviewed.   Constitutional:       General: He is not in acute distress.     Appearance: He is not ill-appearing.   HENT:      Head: Normocephalic.   Eyes:      General: No scleral icterus.     Extraocular Movements: Extraocular movements intact.   Cardiovascular:      Rate and Rhythm: Regular rhythm. Tachycardia present.      Heart sounds: Normal heart sounds. No murmur heard.  Pulmonary:      Effort: No respiratory distress.      Breath sounds: Normal breath sounds. No wheezing.   Abdominal:      General: There is no distension.      Palpations: Abdomen is soft.      Tenderness: There is no abdominal tenderness.   Musculoskeletal:         General: No tenderness.      Right lower leg: No edema.      Left lower leg: No edema.      Comments: BUE contractures   Skin:     General: Skin is warm and dry.   Neurological:      Mental Status: He is alert and oriented to person, place, and time. Mental status is at baseline.   Psychiatric:         Mood and Affect: Mood  normal.         Behavior: Behavior normal.             Significant Labs: All pertinent labs within the past 24 hours have been reviewed.    Significant Imaging: I have reviewed all pertinent imaging results/findings within the past 24 hours.

## 2024-03-26 NOTE — PROGRESS NOTES
"[facility] - Stepdown Flex ([place]  Adult Nutrition  Progress Note    SUMMARY       Recommendations    Continue Minced & Moist (level 5) diet + Boost Plus ONS.  RD to monitor & follow-up.    Goals: Meet % EEN, EPN by RD f/u  Nutrition Goal Status: progressing towards goal  Communication of RD Recs:  (POC)    Assessment and Plan    Nutrition Problem:  Inadequate energy intake      Related to (etiology):   Decreased appetite      Signs and Symptoms (as evidenced by):   PO intake < 75%      Interventions (treatment strategy):  Collaboration of nutrition care w/ other providers   ONS     Nutrition Diagnosis Status:   Continues    Reason for Assessment    Reason For Assessment: RD follow-up  Diagnosis: other (see comments)  Relevant Medical History: CKD stage 3, stroke  Interdisciplinary Rounds: did not attend    General Information Comments: Pt scheduled to discharge today. Per RN documentation - pt tolerating diet + ONS w/ 50% PO intake; TFs discontinued. NFPE complete 3/20 w/ no physical signs of malnutrition found.  Nutrition Discharge Planning: Adequate PO intake    Nutrition/Diet History    Spiritual, Cultural Beliefs, Restoration Practices, Values that Affect Care: no  Food Allergies: other (see comments) (Tomato, Shrimp)  Factors Affecting Nutritional Intake: decreased appetite    Anthropometrics    Temp: 98.3 °F (36.8 °C)  Height Method: Stated  Height: 5' 9" (175.3 cm)  Height (inches): 69 in  Weight Method: Bed Scale  Weight: 108.4 kg (238 lb 15.7 oz)  Weight (lb): 238.98 lb  Ideal Body Weight (IBW), Male: 160 lb  % Ideal Body Weight, Male (lb): 149.36 %  BMI (Calculated): 35.3  BMI Grade: 35 - 39.9 - obesity - grade II    Lab/Procedures/Meds    Pertinent Labs Reviewed: reviewed  Pertinent Labs Comments: -  Pertinent Medications Reviewed: reviewed  Pertinent Medications Comments: -    Estimated/Assessed Needs    Weight Used For Calorie Calculations: 108.4 kg (238 lb 15.7 oz)    Energy Calorie " Requirements (kcal): 1824 kcal/d  Energy Need Method: [place]-St Jeor (1.0 PAL)    Protein Requirements: 98 g/d (.9 g/kg)  Weight Used For Protein Calculations: 108.4 kg (238 lb 15.7 oz)    Estimated Fluid Requirement Method: other (see comments) (Per MD)  RDA Method (mL): 1824    Nutrition Prescription Ordered    Current Diet Order: Minced & Moist (level 5)  Nutrition Order Comments: Boost Plus  Current Nutrition Support Formula Ordered: Other (Comment) (Discontinued)    Evaluation of Received Nutrient/Fluid Intake    I/O: +3.7L since 3/12    Comments: LBM: 3/25    Tolerance: tolerating    Nutrition Risk    Level of Risk/Frequency of Follow-up:  (1x/week)     Monitor and Evaluation    Food and Nutrient Intake: energy intake, food and beverage intake  Food and Nutrient Adminstration: diet order, enteral and parenteral nutrition administration  Knowledge/Beliefs/Attitudes: food and nutrition knowledge/skill, beliefs and attitudes  Physical Activity and Function: nutrition-related ADLs and IADLs, factors affecting access to physical activity  Anthropometric Measurements: height/length, weight, weight change, other (specify), growth pattern indices/percentile ranks, body mass index  Biochemical Data, Medical Tests and Procedures: electrolyte and renal panel, gastrointestinal profile, glucose/endocrine profile, inflammatory profile, lipid profile  Nutrition-Focused Physical Findings: overall appearance, extremities, muscles and bones, head and eyes, skin     Nutrition Follow-Up    RD Follow-up?: Yes

## 2024-03-26 NOTE — PROGRESS NOTES
Patient alert, oriented to self only; free from falls/injuries during the shift; awaiting SNF placement; patient bathed and linen changed during the shift; no BM during shift; will continue with poc.

## 2024-03-26 NOTE — PHYSICIAN QUERY
PT Name: Bj Pate Jr.  MR #: [id number]     DOCUMENTATION CLARIFICATION     CDS/:  Daniel Willis RN, CDS                   Contact Information:  [email or web address]    This form is a permanent document in the medical record.     Query Date: March 26, 2024    By submitting this query, we are merely seeking further clarification of documentation.  Please utilize your independent clinical judgment when addressing the question(s) below.    The Medical Record contains the following   Indicators Supporting Clinical Findings     Location in Medical Record   X Heart Failure documented Heart failure  PN 3/26      BNP     X EF/Echo   Left Ventricle: The left ventricle is normal in size. Normal wall thickness. There is concentric remodeling. Mild global hypokinesis present. There is mildly reduced systolic function with a visually estimated ejection fraction of 45 - 50%. Grade I diastolic dysfunction.    Right Ventricle: Normal right ventricular cavity size. Wall thickness is normal. Right ventricle wall motion  is normal. Systolic function is normal.    Aorta: Aortic root is normal in size measuring 2.82 cm. Ascending aorta is normal measuring 2.85 cm.    Pulmonary Artery: The estimated pulmonary artery systolic pressure is 22 mmHg.    IVC/SVC: Normal venous pressure at 3 mmHg.   TTE 3/4   X Radiology findings Impression:  Probable trace pleural effusions and bibasilar subsegmental atelectasis.  No focal consolidation identified on this hypoventilatory limited single view.   CXR 3/4    Subjective/Objective Respiratory Conditions      Recent/Current MI      Heart Transplant, LVAD     X Edema, JVD Right lower leg: No edema.  Left lower leg: No edema.    [facility] PN 3/11    Ascites      Diuretics/Meds      Other Treatment     X Other Mr. Pate is a 74-year-old gentleman with a past medical history significant for mildly-reduced systolic function, LVEF 45-50%     Systolic heart failure     chronic right sided  "heart failure     Acute on chronic systolic heart failure    Arrhythmia PN attestation 3/7    OP Visit 12/29/2023, Dr. Brunson - Previous Encounter       Heart failure is a clinical diagnosis which includes symptomatic fluid retention, elevated intracardiac pressures, and/or the inability of the heart to deliver adequate blood flow.    Heart Failure with reduced Ejection Fraction (HFrEF) or Systolic Heart Failure (loses ability to contract normally, EF is <40%)    Heart Failure with preserved Ejection Fraction (HFpEF) or Diastolic Heart Failure (stiff ventricles, does not relax properly, EF is >50%)     Heart Failure with Combined Systolic and Diastolic Failure (stiff ventricles, does not relax properly and EF is <50%)    Mid-range or mildly reduced ejection fraction (HFmrEF) is classified as systolic heart failure.  Congestive heart failure with a recovered EF is classified as Diastolic Heart Failure.  Common clues to acute exacerbation:  Rapidly progressive symptoms (w/in 2 weeks of presentation), using IV diuretics, using supplemental O2, pulmonary edema on Xray, new or worsening pleural effusion, +JVD or other signs of volume overload, MI w/in 4 weeks, and/or BNP >500  The clinical guidelines noted are only system guidelines, and do not replace the providers clinical judgment.    Provider, please further specify the diagnosis of "Heart Failure" associated with the above clinical findings.    [  x ]  Chronic Systolic Heart Failure (HFrEF or HFmrEF) - preexisting and stable   [   ]  Other (please specify): ___________________________________   [   ]  Clinically Undetermined       Please document in your progress notes daily for the duration of treatment until resolved and include in your discharge summary.    References:  American Heart Association editorial staff. (2017, May). Ejection Fraction Heart Failure Measurement. American Heart Association. " https://www.heart.org/en/health-topics/heart-failure/diagnosing-heart-failure/ejection-fraction-heart-failure-measurement#:~:text=Ejection%20fraction%20(EF)%20is%20a,pushed%20out%20with%20each%20heartbeat  RACHAEL Carrington (2020, December 15). Heart failure with preserved ejection fraction: Clinical manifestations and diagnosis. [employer]. https://www.[employer]/contents/heart-failure-with-preserved-ejection-fraction-clinical-manifestations-and-diagnosis.  ICD-10-CM/PCS Coding Clinic Third Quarter ICD-10, Effective with discharges: September 8, 2020 [facility] § Heart failure with mid-range or mildly reduced ejection fraction (2020).  ICD-10-CM/PCS Coding Clinic Third Quarter ICD-10, Effective with discharges: September 8, 2020 [facility] § Heart failure with recovered ejection fraction (2020).  Form No. 41039

## 2024-03-26 NOTE — PLAN OF CARE
SW contacted [facility] twice( In regards to SNF placement) and no response.  SW left VM.    SW contacted facility again 2:00pm, no response. SW left message.    SW contacted SW who had pt previously and received a number to call in regards to pt DC planning.  SW attempt to call ([place] [phone]) and left VM.    Pt was previously at [facility] for skilled placement.     Gris Bush MSW, CSW

## 2024-03-27 VITALS
TEMPERATURE: 98 F | DIASTOLIC BLOOD PRESSURE: 53 MMHG | HEIGHT: 69 IN | BODY MASS INDEX: 35.4 KG/M2 | SYSTOLIC BLOOD PRESSURE: 112 MMHG | WEIGHT: 239 LBS | OXYGEN SATURATION: 99 % | HEART RATE: 114 BPM | RESPIRATION RATE: 22 BRPM

## 2024-03-27 LAB
ALBUMIN SERPL BCP-MCNC: 1.3 G/DL (ref 3.5–5.2)
ALP SERPL-CCNC: 99 U/L (ref 55–135)
ALT SERPL W/O P-5'-P-CCNC: 28 U/L (ref 10–44)
ANION GAP SERPL CALC-SCNC: 7 MMOL/L (ref 8–16)
AST SERPL-CCNC: 35 U/L (ref 10–40)
BASOPHILS # BLD AUTO: 0.04 K/UL (ref 0–0.2)
BASOPHILS NFR BLD: 0.3 % (ref 0–1.9)
BILIRUB SERPL-MCNC: 0.9 MG/DL (ref 0.1–1)
BUN SERPL-MCNC: 20 MG/DL (ref 8–23)
CALCIUM SERPL-MCNC: 8.4 MG/DL (ref 8.7–10.5)
CHLORIDE SERPL-SCNC: 108 MMOL/L (ref 95–110)
CO2 SERPL-SCNC: 25 MMOL/L (ref 23–29)
CREAT SERPL-MCNC: 0.8 MG/DL (ref 0.5–1.4)
DIFFERENTIAL METHOD BLD: ABNORMAL
EOSINOPHIL # BLD AUTO: 0.1 K/UL (ref 0–0.5)
EOSINOPHIL NFR BLD: 0.4 % (ref 0–8)
ERYTHROCYTE [DISTWIDTH] IN BLOOD BY AUTOMATED COUNT: 17.5 % (ref 11.5–14.5)
EST. GFR  (NO RACE VARIABLE): >60 ML/MIN/1.73 M^2
GLUCOSE SERPL-MCNC: 71 MG/DL (ref 70–110)
HCT VFR BLD AUTO: 24.6 % (ref 40–54)
HGB BLD-MCNC: 8 G/DL (ref 14–18)
IMM GRANULOCYTES # BLD AUTO: 0.7 K/UL (ref 0–0.04)
IMM GRANULOCYTES NFR BLD AUTO: 4.6 % (ref 0–0.5)
LYMPHOCYTES # BLD AUTO: 1.3 K/UL (ref 1–4.8)
LYMPHOCYTES NFR BLD: 8.6 % (ref 18–48)
MAGNESIUM SERPL-MCNC: 1.5 MG/DL (ref 1.6–2.6)
MCH RBC QN AUTO: 26.9 PG (ref 27–31)
MCHC RBC AUTO-ENTMCNC: 32.5 G/DL (ref 32–36)
MCV RBC AUTO: 83 FL (ref 82–98)
MONOCYTES # BLD AUTO: 1.5 K/UL (ref 0.3–1)
MONOCYTES NFR BLD: 10 % (ref 4–15)
NEUTROPHILS # BLD AUTO: 11.5 K/UL (ref 1.8–7.7)
NEUTROPHILS NFR BLD: 76.1 % (ref 38–73)
NRBC BLD-RTO: 0 /100 WBC
PHOSPHATE SERPL-MCNC: 2.9 MG/DL (ref 2.7–4.5)
PLATELET # BLD AUTO: 409 K/UL (ref 150–450)
PMV BLD AUTO: 11 FL (ref 9.2–12.9)
POTASSIUM SERPL-SCNC: 3.8 MMOL/L (ref 3.5–5.1)
PROT SERPL-MCNC: 5.8 G/DL (ref 6–8.4)
RBC # BLD AUTO: 2.97 M/UL (ref 4.6–6.2)
SODIUM SERPL-SCNC: 140 MMOL/L (ref 136–145)
WBC # BLD AUTO: 15.12 K/UL (ref 3.9–12.7)

## 2024-03-27 PROCEDURE — 97535 SELF CARE MNGMENT TRAINING: CPT

## 2024-03-27 PROCEDURE — 85025 COMPLETE CBC W/AUTO DIFF WBC: CPT | Performed by: STUDENT IN AN ORGANIZED HEALTH CARE EDUCATION/TRAINING PROGRAM

## 2024-03-27 PROCEDURE — 36415 COLL VENOUS BLD VENIPUNCTURE: CPT | Performed by: STUDENT IN AN ORGANIZED HEALTH CARE EDUCATION/TRAINING PROGRAM

## 2024-03-27 PROCEDURE — 94761 N-INVAS EAR/PLS OXIMETRY MLT: CPT

## 2024-03-27 PROCEDURE — 80053 COMPREHEN METABOLIC PANEL: CPT | Performed by: STUDENT IN AN ORGANIZED HEALTH CARE EDUCATION/TRAINING PROGRAM

## 2024-03-27 PROCEDURE — 94668 MNPJ CHEST WALL SBSQ: CPT

## 2024-03-27 PROCEDURE — 83735 ASSAY OF MAGNESIUM: CPT | Performed by: STUDENT IN AN ORGANIZED HEALTH CARE EDUCATION/TRAINING PROGRAM

## 2024-03-27 PROCEDURE — 84100 ASSAY OF PHOSPHORUS: CPT | Performed by: STUDENT IN AN ORGANIZED HEALTH CARE EDUCATION/TRAINING PROGRAM

## 2024-03-27 PROCEDURE — 99900035 HC TECH TIME PER 15 MIN (STAT)

## 2024-03-27 PROCEDURE — 25000003 PHARM REV CODE 250: Performed by: STUDENT IN AN ORGANIZED HEALTH CARE EDUCATION/TRAINING PROGRAM

## 2024-03-27 PROCEDURE — 25000003 PHARM REV CODE 250

## 2024-03-27 PROCEDURE — 92526 ORAL FUNCTION THERAPY: CPT

## 2024-03-27 PROCEDURE — 97110 THERAPEUTIC EXERCISES: CPT | Mod: CO

## 2024-03-27 PROCEDURE — 97535 SELF CARE MNGMENT TRAINING: CPT | Mod: CO

## 2024-03-27 RX ADMIN — SILODOSIN 4 MG: 4 CAPSULE ORAL at 09:03

## 2024-03-27 RX ADMIN — CHLORHEXIDINE GLUCONATE 0.12% ORAL RINSE 15 ML: 1.2 LIQUID ORAL at 09:03

## 2024-03-27 RX ADMIN — ATORVASTATIN CALCIUM 40 MG: 40 TABLET, FILM COATED ORAL at 09:03

## 2024-03-27 RX ADMIN — MICONAZOLE NITRATE 2 % TOPICAL POWDER: at 09:03

## 2024-03-27 NOTE — PT/OT/SLP PROGRESS
"Speech Language Pathology Treatment    Patient Name:  Bj Pate Jr.   MRN:  [id number]  Admitting Diagnosis: Acute encephalopathy    Recommendations:                 General Recommendations: Dysphagia therapy and ongoing f/u with Registered Dietician   Diet recommendations:  Minced & Moist Diet - IDDSI Level 5, Liquid Diet Level: Thin liquids - IDDSI Level 0   Aspiration Precautions:  only when awake/alert/attentive and upright, single bites/sips, 1 bite/sip at a time, Assistance with meals, Check for pocketing/oral residue, Eliminate distractions, Feed only when awake/alert, Frequent oral care, Meds crushed in puree, and Monitor for s/s of aspiration. Continue to monitor for signs and symptoms of aspiration and discontinue oral feeding should you notice any of the following: watery eyes, reddened facial area, wet vocal quality, increased work of breathing, change in respiratory status, increased congestion, coughing, fever and/or change in level of alertness   General Precautions: Standard, aspiration, fall  Communication strategies:  go to room if call light pushed    Assessment:     Bj Pate Jr. is a 74 y.o. male with an SLP diagnosis of Dysphagia and Dysphonia.      Subjective     Spoke with RN prior to entry.     "I don't like the minced food."    Pain/Comfort:  Pain Rating 1: 7/10 (Lower back)  Location - Orientation 1: generalized  Pain Addressed 1: Reposition, Distraction  Pain Rating Post-Intervention 1: 7/10 (Lower back)  Location - Orientation 2: generalized  Pain Addressed 2: Reposition, Distraction    Respiratory Status: Room air    Objective:     Has the patient been evaluated by SLP for swallowing?   Yes  Keep patient NPO? No       Prior to entry, RN reports pt with minimal PO intake. He continues to drink and tolerate boost. RN reports pt tolerated meds whole with water despite recs for meds crushed in puree. SLP with an initial attempt to see the pt, however OT at bedside. Shortly after, OT " requesting assistance with repositioning the pt 2/2 lower back pain (level 7). Once repositioned, SLP raised the HOB for safety precautions. Pt reports minimal PO intake, decreased appetite, and dissatisfaction with minced/moist solids. Breakfast tray noted at bedside untouched. Pt reports completing oral care this morning. He states that he uses partial upper dentures. Dentures noted in oral cavity prior to PO trials. Vocal quality was breathy and hoarse with decreased intensity. Pt agreeable to sips of water via straw and bites of peaches. Pt tolerated sips of thin liquids without overt signs of aspiration. With PO trials of soft solids, pt with moderate-severe prolonged mastication. Throat clear x1 noted during mastication of soft solids. Watery eyes observed following the swallow. SLP providing education on rationale for ongoing recs for minced/moist solids, meds crushed in puree, aspiration precautions, signs of aspiration, and POC. Pt nodded his head in agreement. Upon exiting the room, pt remained upright in bed, all needs addressed, and pt continued to report a level 7 pain in his lower back. RN updated on overall impressions, pain, and ongoing recs.     Goals:   Multidisciplinary Problems       SLP Goals          Problem: SLP    Goal Priority Disciplines Outcome   SLP Goal     SLP Ongoing, Progressing   Description: Speech Language Pathology Goals  Goals expected to be met by 3/28  1. Pt will participate in Modified Barium Swallow Study to r/o aspiration and determine safest PO diet. -completed   2. Pt will tolerate Level V minced and moist textures without overt S/S aspiration, MIN A  3. Pt will tolerate trials of advanced textures with adequate oral clearance and no overt S/S aspiration, MIN A                               Plan:     Patient to be seen:  3 x/week   Plan of Care expires:  04/12/24  Plan of Care reviewed with:  patient   SLP Follow-Up:  Yes       Discharge recommendations:  Moderate  Intensity Therapy   Barriers to Discharge:  None    Time Tracking:     SLP Treatment Date:   03/27/24  Speech Start Time:  1026  Speech Stop Time:  1043     Speech Total Time (min):  17 min    Billable Minutes: Treatment Swallowing Dysfunction 9 and Self Care/Home Management Training 8    03/27/2024

## 2024-03-27 NOTE — PLAN OF CARE
"Auth was approved.    SW called son 3/26/24 @5pm letting him know that no one was picking up at the facility. SW suggested that family look into other options in regards to SNF placement, but son stated, " No I know someone that works there and I want my dad to go back there."    SW attempted to call facility twice this morning and left VM.     Gris Bush MSW, CSW    "

## 2024-03-27 NOTE — NURSING
Patient IV removed with catheter intact; patient transferred via stretcher with [facility] with personal belongings; report called to Audrey Dodson.

## 2024-03-27 NOTE — NURSING
Pt alert, delayed speech, VSS stable, free from falls/injuries, call light within reach. No new events to report.

## 2024-03-27 NOTE — PROGRESS NOTES
[facility] ([place] [id number])  Wound Care    Patient Name:  Bj Pate Jr.   MRN:  [id number]  Date: 3/27/2024  Diagnosis: Acute encephalopathy    History:     Past Medical History:   Diagnosis Date    Acute on chronic systolic heart failure 12/13/2023    Anemia 06/04/2021    Anticoagulant long-term use     Basal ganglia hemorrhage     Left basal ganglia hemorrhage with resultant right-sided hemiparesis which has resolved.     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        Social History     Socioeconomic History    Marital status:     Number of children: 8    Highest education level: 11th grade   Occupational History    Occupation:    Tobacco Use    Smoking status: Never    Smokeless tobacco: Never   Substance and Sexual Activity    Alcohol use: Yes     Alcohol/week: 0.0 standard drinks of alcohol     Comment: occacionally    Drug use: No    Sexual activity: Not Currently     Social Determinants of Health     Financial Resource Strain: Low Risk  (3/5/2024)    Overall Financial Resource Strain (CARDIA)     Difficulty of Paying Living Expenses: Not very hard   Food Insecurity: No Food Insecurity (3/5/2024)    Hunger Vital Sign     Worried About Running Out of Food in the Last Year: Never true     Ran Out of Food in the Last Year: Never true   Transportation Needs: No Transportation Needs (3/5/2024)    PRAPARE - Transportation     Lack  of Transportation (Medical): No     Lack of Transportation (Non-Medical): No   Physical Activity: Inactive (3/5/2024)    Exercise Vital Sign     Days of Exercise per Week: 0 days     Minutes of Exercise per Session: 0 min   Stress: Patient Declined (12/12/2023)    [employer] - Occupational Stress Questionnaire     Feeling of Stress : Patient declined   Social Connections: Unknown (3/5/2024)    Social Connection and Isolation Panel [NHANES]     Frequency of Communication with Friends and Family: More than three times a week     Frequency of Social Gatherings with Friends and Family: More than three times a week     Attends L[employer]-day Services: Patient declined     Active Member of Clubs or Organizations: Patient declined     Attends Club or Organization Meetings: Patient declined   Housing Stability: Unknown (3/5/2024)    Housing Stability Vital Sign     Unable to Pay for Housing in the Last Year: No     Unstable Housing in the Last Year: No       Precautions:     Allergies as of 03/04/2024 - Reviewed 03/04/2024   Allergen Reaction Noted    Tomato (solanum lycopersicum) Hives 09/26/2013    Naproxen Hives 10/22/2012    Shrimp Other (See Comments) 03/07/2017       [facility] Assessment Details/Treatment     Patient seen for wound care consultation.   Reviewed chart for this encounter.   See Flow Sheet for findings.      RECOMMENDATIONS: Patient Aox4 and agreeable to inpatient wound care. Patient currently followed by inpatient wound care for buttocks. Wound has shown improvement and healing. Continued use of triad for moisture barrier still advised. Due to low josemanuel score, patient on immerse bed for pressure redistribution and microclimate. No other issues or concerns at this time. Inpatient wound care sign off.    Discussed POC with patient and primary nurse.   See EMR for orders & patient education.    Discussed nutrition and the role of protein in wound healing with the patient. Instructed  patient to optimize protein for wound healing.    Bedside nursing to continue care & monitoring.  Bedside nursing to maintain pressure injury prevention interventions.            03/27/24 1050   WOCN Assessment   WOCN Total Time (mins) 30   Visit Date 03/27/24   Visit Time 1050   Consult Type Follow Up   WOCN Speciality Wound   Intervention assessed;changed;applied;chart review;coordination of care;orders   Teaching on-going     Orders placed.   Humberto Echeverria RN  03/27/2024

## 2024-03-27 NOTE — PT/OT/SLP PROGRESS
"Occupational Therapy   Treatment    Name: Bj Pate Jr.  MRN: [id number]  Admitting Diagnosis:  Acute encephalopathy       Recommendations:     Discharge Recommendations: Moderate Intensity Therapy  Discharge Equipment Recommendations:  to be determined by next level of care  Barriers to discharge:       Assessment:     Bj Pate Jr. is a 74 y.o. male with a medical diagnosis of Acute encephalopathy.  He presents with the following performance deficits affecting function: gait instability, weakness, impaired endurance, impaired balance, impaired self care skills, impaired cognition, impaired functional mobility, abnormal tone, impaired skin, pain, decreased safety awareness, decreased lower extremity function, decreased upper extremity function, impaired coordination, impaired fine motor, impaired muscle length, decreased ROM, impaired cardiopulmonary response to activity.     Pt agreeable to therapy and tolerated session well. Pt assisted with g/h tasks withHOB elevated. Pt then assisting with UE PROM and gentle stretching to improve ROM. At the end of session, wedge placed on R side to offload pressure from buttocks, as pt c/o pain.      Rehab Prognosis:  Good; patient would benefit from acute skilled OT services to address these deficits and reach maximum level of function.       Plan:     Patient to be seen 4 x/week to address the above listed problems via self-care/home management, therapeutic activities, therapeutic exercises, neuromuscular re-education  Plan of Care Expires: 04/12/24  Plan of Care Reviewed with: patient    Subjective     Chief Complaint: pain from pressure wounds  Patient/Family Comments/goals: to improve function  Pain/Comfort:  Pain Rating 1: other (see comments) (unrated)  Location - Orientation 1: generalized  Location 1:  ("all over")  Pain Addressed 1: Distraction, Reposition  Pain Rating Post-Intervention 1: other (see comments) (unrated)  Pain Rating 2: other (see comments) " (unrated)  Location - Orientation 2: generalized  Location 2: coccyx  Pain Addressed 2: Reposition, Distraction  Pain Rating Post-Intervention 2: other (see comments) (unrated)    Objective:     Communicated with: RN prior to session.  Patient found HOB elevated with pulse ox (continuous), Condom Catheter, telemetry upon OT entry to room.    General Precautions: Standard, aspiration, fall    Orthopedic Precautions:N/A  Braces: N/A  Respiratory Status: Room air     Occupational Performance:     Bed Mobility:    Patient completed Rolling/Turning to Left with  total assistance  Patient completed Scooting/Bridging with total assistance to HOB    Functional Mobility/Transfers:  Not performed    Activities of Daily Living:  Grooming: total assistance for oral hygiene(swab and chlorhexidine) and facial care with HOB elevated      [facility] 6 Click ADL: 6    Treatment & Education:  Pt educated on OT POC and frequency during hospital stay.   Seated EOB, pt completed BUE therex PROM([id number] each)  - Shoulder flexion  - elbow flexion/extension  - wrist flexion/extension  Gentle passive stretching on UE to improve ROM.  Addressed all patient questions/concerns within HOUSE scope of practice.     Patient left HOB elevated with all lines intact, call button in reach, and SLP present    GOALS:   Multidisciplinary Problems       Occupational Therapy Goals          Problem: Occupational Therapy    Goal Priority Disciplines Outcome Interventions   Occupational Therapy Goal     OT, PT/OT Ongoing, Progressing    Description: Goals to be met by: 4/2/2024     Patient will increase functional independence with ADLs by performing:    UE Dressing with Moderate Assistance.  Grooming while seated with Minimal Assistance.   Sitting at edge of bed x8 minutes with Contact Guard Assistance.  Supine to sit with Moderate Assistance.  Sit<>stand transfers with Minimal Assistance.  Upper extremity exercise program 2x10 reps, with supervision.                          Time Tracking:     OT Date of Treatment: 03/27/24  OT Start Time: 1002  OT Stop Time: 1030  OT Total Time (min): 28 min    Billable Minutes:Self Care/Home Management 12  Therapeutic Exercise 16    OT/АЛЕКСАНДР: АЛЕКСАНДР     Number of АЛЕКСАНДР visits since last OT visit: 4    3/27/2024

## 2024-03-28 LAB — BACTERIA BLD CULT: NORMAL

## 2024-03-28 NOTE — PLAN OF CARE
03/28/24 0932   Final Note   Assessment Type Final Discharge Note   Anticipated Discharge Disposition SNF   What phone number can be called within the next 1-3 days to see how you are doing after discharge? [phone]   Hospital Resources/Appts/Education Provided Provided patient/caregiver with written discharge plan information   Post-Acute Status   Post-Acute Authorization Placement   Post-Acute Placement Status Set-up Complete/Auth obtained   Patient choice form signed by patient/caregiver List with quality metrics by geographic area provided   Discharge Delays None known at this time     Pt was DC and returned to [facility] for SNF.    Gris Bush MSW, CSW

## 2024-03-30 NOTE — DISCHARGE SUMMARY
DISCHARGE SUMMARY  Hospital Medicine    Team: [facility] HOSP MED B    Patient Name: Bj Pate Jr.  YOB: 1950    Admit Date: 3/4/2024    Discharge Date: 3/27/2024    Discharge Attending Physician: Savanah Walker     Admitting Resident    Diagnoses:  Active Hospital Problems    Diagnosis  POA    *Acute encephalopathy [G93.40]  Yes    Altered mental status [R41.82]  Yes    Bradycardia [R00.1]  Yes    Heart failure [I50.9]  Yes    Chronic indwelling Rivera catheter [Z97.8]  Not Applicable    Diarrhea [R19.7]  No    Dysphagia [R13.10]  Yes    Second degree AV block, Mobitz type I [I44.1]  Yes    Hypothermia [T68.XXXA]  Yes    Sepsis [A41.9]  Yes    Hemiparesis [G81.90]  Yes    Anemia [D64.9]  Yes     Likely due to ckd, chronic disease, recommend checking iron deficiency      Clostridium difficile infection [A49.8]  Yes    Urinary tract infection without hematuria [N39.0]  Yes    History of CVA (cerebrovascular accident) [Z86.73]  Not Applicable      Resolved Hospital Problems    Diagnosis Date Resolved POA    History of cerebral parenchymal hemorrhage [Z86.79] 03/04/2024 Not Applicable       Discharged Condition: admit problems have stabilized      Patient seen and examined on date of discharge. 45 min spent on face to face time and medical decision making.     Physical Exam  Vitals and nursing note reviewed.   Constitutional:       General: He is not in acute distress.     Appearance: He is not ill-appearing.   HENT:      Head: Normocephalic.   Eyes:      General: No scleral icterus.     Extraocular Movements: Extraocular movements intact.   Cardiovascular:      Rate and Rhythm: Regular rhythm. Tachycardia present.      Heart sounds: Normal heart sounds. No murmur heard.  Pulmonary:      Effort: No respiratory distress.      Breath sounds: Normal breath sounds. No wheezing.   Abdominal:      General: There is no distension.      Palpations: Abdomen is soft.      Tenderness: There is no abdominal tenderness.    Musculoskeletal:         General: No tenderness.      Right lower leg: No edema.      Left lower leg: No edema.      Comments: BUE contractures   Skin:     General: Skin is warm and dry.   Neurological:      Mental Status: He is alert and oriented to person, place, and time. Mental status is at baseline.   Psychiatric:         Mood and Affect: Mood normal.         Behavior: Behavior normal.      HOSPITAL COURSE:      Initial Presentation:    the ED from SNF for AMS. Per chart, he was more somnolent than usual over the last 2 days. At baseline he is verbal and follows commands but does not ambulate. On arrival to ED he was hypothermic to 87.9F and found to be hypoglycemic to the 50s. Intermittently hypotensive to MAP 55. Patient is awake but does not interact meaningfully on exam. Chronic Rivera was initially draining cloudy urine with sediment, Rivera exchanged. Initial labs without leukocytosis, CMP unremarkable, VBG with pH 7.49/30.3/38.9, lactate normal. Received 1L NS bolus through warming pump. Initially admitted to , critical care consulted for severe hypothermia with hypotension.     Overview/Hospital Course:  Admitted to the ED from SNF with acute encephalopathy. Initially admitted to hospital medicine however critical care consulted for severe hypothermia and hypotension.  Intubated 3/4 for airway protection, eventually extubated 3/10.  Workup notable for Citrobacter farmeri UTI, antibiotics tailored accordingly.  Neurology has been on board due to ongoing encephalopathy.  Recommending continued treatment of active medical issues.  Admission further complicated by heart block, likely Wenckebach for which EP was consulted. Likely indicative of periods of increased vagal tone or disordered sleep breathing, no evidnece of high degree block recorded, no indication for PPM. In addition, has been relatively intolerant of p.o. intake, NG tube has been placed with tube feeds started. Failed MBSS. Neurology  following, deferred LP for repeat spinal imaging. Quality limited by motion artifact. Patient's mental status near baseline by 3/15. Cleared for PO by SLP on 3/18. NG tube to be removed. Patient with unclear history of urinary retention and chronic bryant. Bryant catheter removed AM 3/18, passed voiding trial. Diarrhea reported by nursing 3/15, c diff positive, started on oral vanc. Last episode of diarrhea documented on 3/16. Per patient's SNF, patient must be off contact precautions before returning. Patient to remain inpatient until completion of treatment for c diff on 3/26 when contact precautions can be discontinued as per hospital protocol.        Course of Principle Problem for Admission:    Acute encephalopathy  Resolved  Presented from NH with altered mentation, initially intubated for airway protection 3/4, extubated 3/10. Likely acute on chronic in the setting of history of CVA with likely low cognitive reserve at baseline, and exacerbated by UTI. Varying accounts of baseline makes evaluation difficult. Per daughter: Has not been functionally independent since November (before December admission), currently lives in assisted living and requires assstance with ADLs. Mobility significantly impaird. AOx3 4-5 days out of the week, at other times will be confused and disoriented. 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 CTH on admission unremarkable for acute process. MRI spine obtained on 3/10 showing chronic dicitis  - Status post treatment for UTI  - Neurology on board, expect extended return to baseline given significant infection in the setting of fairly low cognitive reserve, continue treatment of active medical issues         Repeat imaging limited by motion artifact, neurology deferring further intervention as mental status is improved 3/16   Resolved to baseline at time of discharge.     Diarrhea  Resolved  Significant diarrhea reported by  nursing AM 3/15. No bowel regimen for over 48 hours and recent abx use. Concern for c diff vs tube feeds > c diff positive  - oral vanc started 3/16  with end date 3/26 completed    Other Medical Problems Addressed in the Hospital:    Second degree AV block, Mobitz type I  Stable  Concern for CHB overnight 3/6  -EP: Likely indicative of periods of increased vagal tone or disordered sleep breathing, no evidence of high-degree block noted therefore no indication for PPM/transcutaneous pacing    CONSULTS: Neurology     Last CBC/BMP/HgbA1c (if applicable):  Recent Results (from the past 336 hour(s))   CBC with Automated Differential    Collection Time: 03/27/24  5:51 AM   Result Value Ref Range    WBC 15.12 (H) 3.90 - 12.70 K/uL    Hemoglobin 8.0 (L) 14.0 - 18.0 g/dL    Hematocrit 24.6 (L) 40.0 - 54.0 %    Platelets 409 150 - 450 K/uL   CBC with Automated Differential    Collection Time: 03/25/24  5:27 AM   Result Value Ref Range    WBC 12.24 3.90 - 12.70 K/uL    Hemoglobin 8.3 (L) 14.0 - 18.0 g/dL    Hematocrit 25.4 (L) 40.0 - 54.0 %    Platelets 473 (H) 150 - 450 K/uL   CBC with Automated Differential    Collection Time: 03/23/24  6:13 AM   Result Value Ref Range    WBC 16.33 (H) 3.90 - 12.70 K/uL    Hemoglobin 6.8 (L) 14.0 - 18.0 g/dL    Hematocrit 20.6 (L) 40.0 - 54.0 %    Platelets 520 (H) 150 - 450 K/uL     Recent Results (from the past 336 hour(s))   Basic metabolic panel    Collection Time: 03/18/24  4:12 PM   Result Value Ref Range    Sodium 131 (L) 136 - 145 mmol/L    Potassium 4.3 3.5 - 5.1 mmol/L    Chloride 99 95 - 110 mmol/L    CO2 26 23 - 29 mmol/L    BUN 16 8 - 23 mg/dL    Creatinine 0.8 0.5 - 1.4 mg/dL    Calcium 8.7 8.7 - 10.5 mg/dL    Anion Gap 6 (L) 8 - 16 mmol/L     Lab Results   Component Value Date    HGBA1C 4.9 12/11/2023       Disposition:  SNF      Future Scheduled Appointments:  No future appointments.    Follow-up Plans from This Hospitalization:  PCP    Discharge Medication List:         Medication List        START taking these medications      miconazole NITRATE 2 % 2 % top powder  Commonly known as: MICOTIN  Apply topically 2 (two) times daily.     silodosin 4 mg Cap capsule  Commonly known as: RAPAFLO  Take 1 capsule (4 mg total) by mouth once daily.            CONTINUE taking these medications      aspirin 81 MG EC tablet  Commonly known as: ECOTRIN     atorvastatin 40 MG tablet  Commonly known as: LIPITOR  Take 1 tablet (40 mg total) by mouth once daily.     blood sugar diagnostic Strp  To check BG 2 times daily, to use with insurance preferred meter     fluticasone-salmeterol 500-50 mcg/dose 500-50 mcg/dose Dsdv diskus inhaler  Commonly known as: WIXELA INHUB  Inhale 1 puff into the lungs 2 (two) times daily. Controller     Lactobacillus rhamnosus GG 10 billion cell capsule  Commonly known as: CULTURELLE  1 capsule by Per G Tube route once daily.     lancets Misc  To check BG 2 times daily, to use with insurance preferred meter            STOP taking these medications      colchicine 0.6 mg tablet  Commonly known as: COLCRYS     furosemide 80 MG tablet  Commonly known as: LASIX     hydroCHLOROthiazide 25 MG tablet  Commonly known as: HYDRODIURIL     losartan 25 MG tablet  Commonly known as: COZAAR     pantoprazole 40 MG tablet  Commonly known as: PROTONIX     psyllium husk (aspartame) 3.4 gram Pwpk packet  Commonly known as: METAMUCIL     QUEtiapine 25 MG Tab  Commonly known as: SEROQUEL     tamsulosin 0.4 mg Cap  Commonly known as: FLOMAX               Where to Get Your Medications        These medications were sent to [facility] [address], [place] [address]      Hours: Mon-Fri 7a-7p, Sat-Sun 10a-4p Phone: [phone]   silodosin 4 mg Cap capsule       Information about where to get these medications is not yet available    Ask your nurse or doctor about these medications  miconazole NITRATE 2 % 2 % top powder         Patient Instructions:  No discharge  procedures on file.    Signing Physician:  Savanah Walker MD

## 2024-05-28 DIAGNOSIS — Z00.00 ENCOUNTER FOR MEDICARE ANNUAL WELLNESS EXAM: ICD-10-CM

## 2024-06-03 PROBLEM — A41.9 SEPSIS: Status: RESOLVED | Noted: 2024-03-04 | Resolved: 2024-06-03

## 2024-06-10 NOTE — PT/OT/SLP PROGRESS
"Occupational Therapy   Co-Treatment with PT  Co-treatment performed due to patient's multiple deficits requiring two skilled therapists to appropriately and safely assess patient's strength and endurance while facilitating functional tasks in addition to accommodating for patient's activity tolerance.     Name: Bj Pate Jr.  MRN: [id number]  Admitting Diagnosis:  Acute encephalopathy       Recommendations:     Discharge Recommendations: Moderate Intensity Therapy  Discharge Equipment Recommendations:  to be determined by next level of care  Barriers to discharge:       Assessment:     Bj Pate Jr. is a 74 y.o. male with a medical diagnosis of Acute encephalopathy.  He presents with the following performance deficits affecting function: weakness, gait instability, impaired balance, impaired endurance, impaired functional mobility, impaired self care skills, abnormal tone, edema, impaired skin, impaired cardiopulmonary response to activity, impaired coordination, decreased lower extremity function, decreased upper extremity function, decreased ROM, decreased safety awareness.     Pt agreeable to therapy and tolerated session well. Pt required significant assistance with bed mobility. Pt sat EOB with 2 person assistance and participated in PROM exercises.     Rehab Prognosis:  Good; patient would benefit from acute skilled OT services to address these deficits and reach maximum level of function.       Plan:     Patient to be seen 4 x/week to address the above listed problems via self-care/home management, therapeutic activities, therapeutic exercises, neuromuscular re-education  Plan of Care Expires: 04/12/24  Plan of Care Reviewed with: patient    Subjective     Patient/Family Comments/goals: to improve function  Pain/Comfort:  Pain Rating 1:  (unrated)  Location - Orientation 1: generalized  Location 1:  ("everywhere")  Pain Addressed 1: Reposition, Distraction  Pain Rating Post-Intervention 1:  " ----- Message from Allyssa Coates MA sent at 6/7/2024  2:10 PM CDT -----  Regarding: FW: Patient needs a new bra  Contact: Ernestine [phone]    ----- Message -----  From: Marino Jackson  Sent: 6/7/2024   2:07 PM CDT  To: [facility] Staff  Subject: Patient needs a new bra                          1MEDICALADVICE     Patient is calling for Medical Advice regarding: Patient is finding her sports bra a challenge and would like alternative suggestions for a new bra. She says she had an operation that removed some of her breast and was recommended to use a sports bra. She says she uses a shoe horn to zip it up and it is tiring for her. She is a 89 year old patient who lives alone and would like an alternative to the current bra she is using. Please call back patient to discuss, preferably a nurse who uses a bra.    How long has patient had these symptoms:    Pharmacy name and phone#:    Would like response via [employer]:  Call    Comments:   (unrated)    Objective:     Communicated with: RN prior to session.  Patient found HOB elevated with pulse ox (continuous), Condom Catheter, telemetry upon OT entry to room.    General Precautions: Standard, aspiration, fall    Orthopedic Precautions:N/A  Braces: N/A  Respiratory Status: Room air     Occupational Performance:     Bed Mobility:    Patient completed Rolling/Turning to Left with  total assistance and 2 persons  Patient completed Rolling/Turning to Right with total assistance and 2 persons  Patient completed Scooting/Bridging with total assistance and 2 persons  Patient completed Supine to Sit with total assistance and 2 persons  Patient completed Sit to Supine with total assistance and 2 persons     Activities of Daily Living:  Lower Body Dressing: total assistance to don socks      AMPAC 6 Click ADL: 6    Treatment & Education:  Pt educated on OT POC and frequency during hospital stay.   Pt particiapting in PROM exercises to improve ROM and function.   Pt sat EOB with Total A of 2 persons, attempting core activation activities.   Addressed all patient questions/concerns within HOUSE scope of practice.      Patient left HOB elevated with all lines intact, call button in reach, bed alarm on, and RN notified    GOALS:   Multidisciplinary Problems       Occupational Therapy Goals          Problem: Occupational Therapy    Goal Priority Disciplines Outcome Interventions   Occupational Therapy Goal     OT, PT/OT Ongoing, Progressing    Description: Goals to be met by: 4/2/2024     Patient will increase functional independence with ADLs by performing:    UE Dressing with Moderate Assistance.  Grooming while seated with Minimal Assistance.   Sitting at edge of bed x8 minutes with Contact Guard Assistance.  Supine to sit with Moderate Assistance.  Sit<>stand transfers with Minimal Assistance.  Upper extremity exercise program 2x10 reps, with supervision.                         Time Tracking:     OT Date of  Treatment: 03/19/24  OT Start Time: 0928  OT Stop Time: 1009  OT Total Time (min): 41 min    Billable Minutes:Neuromuscular Re-education 41    OT/АЛЕКСАНДР: АЛЕКСАНДР     Number of АЛЕКСАНДР visits since last OT visit: 2    3/19/2024

## 2024-06-17 PROBLEM — N39.0 URINARY TRACT INFECTION WITHOUT HEMATURIA: Status: RESOLVED | Noted: 2020-03-06 | Resolved: 2024-06-17

## 2024-06-28 DIAGNOSIS — I10 ESSENTIAL HYPERTENSION: ICD-10-CM

## 2024-06-28 RX ORDER — DILTIAZEM HYDROCHLORIDE 240 MG/1
240 CAPSULE, COATED, EXTENDED RELEASE ORAL DAILY
Qty: 90 CAPSULE | Refills: 0 | Status: SHIPPED | OUTPATIENT
Start: 2024-06-28

## 2024-06-28 NOTE — TELEPHONE ENCOUNTER
Care Due:                  Date            Visit Type   Department     Provider  --------------------------------------------------------------------------------                                EP -                              PRIMARY      [facility]  Last Visit: 09-      CARE ([facility])   MEDICINE       Jose Antonio Foster  Next Visit: None Scheduled  None         None Found                                                            Last  Test          Frequency    Reason                     Performed    Due Date  --------------------------------------------------------------------------------    Lipid Panel.  12 months..  atorvastatin.............  09- 09-    [facility] Embedded Care Due Messages. Reference number: [id number].   6/28/2024 9:58:51 AM CDT

## 2025-03-17 NOTE — ASSESSMENT & PLAN NOTE
- , ,   ;  TB 2.3  - CT abdomen unremarkable  - acute hepatitis panel pending  - further management as above     The cardioversion/defibrillation pads were placed in the posterior/lateral position.  The presenting arrhythmia was atrial flutter. A direct-current 360 joule discharge was delivered synchronized to the ECG R-wave. The post cardioversion rhythm was sinus.

## (undated) DEVICE — DRESSING SURGICAL 3/4X3/4

## (undated) DEVICE — BLADE 4IN EDGE INSULATED

## (undated) DEVICE — TRAY NEURO OMC

## (undated) DEVICE — KIT MANIFOLD LOW PRESS TUBING

## (undated) DEVICE — TAP 6MM SPINAL POWER

## (undated) DEVICE — GLOVE BIOGEL PI MICRO INDIC 7

## (undated) DEVICE — SUT CTD VICRYL 2-0 CR/CT-2

## (undated) DEVICE — SEE MEDLINE ITEM 152514

## (undated) DEVICE — DRAPE THREE-QTR REINF 53X77IN

## (undated) DEVICE — STAPLER SKIN PROXIMATE WIDE

## (undated) DEVICE — PACK CATH LAB

## (undated) DEVICE — KIT SPINAL PATIENT CARE JACK

## (undated) DEVICE — SOL PVP-I SCRUB 7.5% 4OZ

## (undated) DEVICE — NDL 18GA X1 1/2 REG BEVEL

## (undated) DEVICE — CONTRAST VISIPAQUE 150ML

## (undated) DEVICE — CORD BIPOLAR 12 FOOT

## (undated) DEVICE — KIT SURGIFLO HEMOSTATIC MATRIX

## (undated) DEVICE — SUT 4/0 18IN ETHILON BL P3

## (undated) DEVICE — SYR IRRIGATION BULB STER 60ML

## (undated) DEVICE — KIT GLIDESHEATH SLEND 6FR 10CM

## (undated) DEVICE — BUR BONE CUT MICRO TPS 3X3.8MM

## (undated) DEVICE — DRAPE SURG W/TWL 17 5/8X23

## (undated) DEVICE — PACK UPPER EXTREMITY BAPTIST

## (undated) DEVICE — SUT STRATAFIX PDS PLUS VIO

## (undated) DEVICE — SUT SILK 3-0 BLK BR SH 30IN

## (undated) DEVICE — DRAPE TOP 53X102IN

## (undated) DEVICE — SPONGE LAP 4X18 PREWASHED

## (undated) DEVICE — FRAZIER 18FR

## (undated) DEVICE — BANDAGE ELASTIC 2X5 VELCRO ST

## (undated) DEVICE — PAD DEFIB CADENCE ADULT R2

## (undated) DEVICE — KIT EVACUATOR 3-SPRING 1/8 DRN

## (undated) DEVICE — TUBE FRAZIER 5MM 2FT SOFT TIP

## (undated) DEVICE — DRESSING TRANS 4X4 TEGADERM

## (undated) DEVICE — DRESSING AQUACEL FOAM 5 X 5

## (undated) DEVICE — GAUZE DERMACEA LOW PLY 3X4YRDS

## (undated) DEVICE — DRESSING AQUACEL FOAM 4 X 12

## (undated) DEVICE — COVER BACK TBL HD 2-TIER 72IN

## (undated) DEVICE — SYR B-D DISP CONTROL 10CC100/C

## (undated) DEVICE — DRAPE INCISE IOBAN 2 23X17IN

## (undated) DEVICE — WIRE GUIDE SAFE-T-J .035 260CM

## (undated) DEVICE — DRESSING MEPILEX BORDER 4 X 4

## (undated) DEVICE — CATH JACKY RADIAL 3.5 110CM

## (undated) DEVICE — SEALANT ADHERUS AUTOSPRY DURAL

## (undated) DEVICE — DRAPE ABDOMINAL TIBURON 14X11

## (undated) DEVICE — PAD UNDERPAD 30X30

## (undated) DEVICE — DRESSING SURGICAL 1/2X1/2

## (undated) DEVICE — CLIP MED TICALL

## (undated) DEVICE — NDL SPINAL 18GX3.5 SPINOCAN

## (undated) DEVICE — DRAPE C-ARM ELAS CLIP 42X120IN

## (undated) DEVICE — PAD CAST SPECIALIST STRL 3

## (undated) DEVICE — DRESSING XEROFORM FOIL PK 1X8

## (undated) DEVICE — DRESSING N ADH OIL EMUL 3X3

## (undated) DEVICE — SUT VICRYL+ 1 CT1 18IN

## (undated) DEVICE — DRAPE STERI INSTRUMENT 1018

## (undated) DEVICE — CANISTER INFOV.A.C WOUND 500ML

## (undated) DEVICE — PAD RADI FEMORAL

## (undated) DEVICE — SEALER AQUAMANTYS 2.3 BIPOLAR

## (undated) DEVICE — ELECTRODE REM PLYHSV RETURN 9

## (undated) DEVICE — KIT SYR REUSABLE

## (undated) DEVICE — SPHERE MARKER REFLECTIVE DISP

## (undated) DEVICE — KIT PREVENA PLUS

## (undated) DEVICE — SPONGE COTTON TRAY 4X4IN

## (undated) DEVICE — GAUZE SPONGE 4X4 12PLY

## (undated) DEVICE — CAUTERY BOVIE PENCIL

## (undated) DEVICE — DRAPE C-ARMOR EQUIPMENT COVER

## (undated) DEVICE — KIT HAND CONTROL HIGH PRESSUR

## (undated) DEVICE — APPLICATOR CHLORAPREP ORN 26ML

## (undated) DEVICE — GLOVE BIOGEL ECLIPSE SZ 7

## (undated) DEVICE — NDL SAFETY 22G X 1.5 ECLIPSE

## (undated) DEVICE — TOURNIQUET SB QC DP 18X4IN

## (undated) DEVICE — BANDAGE COBAN COLOR ASSORT 3X5

## (undated) DEVICE — TRAY CATH FOL SIL URIMTR 16FR

## (undated) DEVICE — DRAPE STERI-DRAPE 1000 17X11IN

## (undated) DEVICE — DRESSING AQUACEL FOAM 3 X 3

## (undated) DEVICE — MARKER SKIN STND TIP BLUE BARR

## (undated) DEVICE — CHLORAPREP W TINT 26ML APPL

## (undated) DEVICE — HEMOSTAT VASC BAND LONG 27CM

## (undated) DEVICE — FORCEP STRAIGHT DISP

## (undated) DEVICE — SPONGE GAUZE 16PLY 4X4

## (undated) DEVICE — DRESSING AQUACEL SACRAL 9 X 9